# Patient Record
Sex: FEMALE | Race: WHITE | Employment: OTHER | RURAL
[De-identification: names, ages, dates, MRNs, and addresses within clinical notes are randomized per-mention and may not be internally consistent; named-entity substitution may affect disease eponyms.]

---

## 2017-01-04 RX ORDER — SIMVASTATIN 20 MG
TABLET ORAL
Qty: 30 TABLET | Refills: 5 | Status: SHIPPED | OUTPATIENT
Start: 2017-01-04 | End: 2017-06-20 | Stop reason: SDUPTHER

## 2017-01-13 ENCOUNTER — OFFICE VISIT (OUTPATIENT)
Dept: PRIMARY CARE CLINIC | Age: 57
End: 2017-01-13
Payer: MEDICARE

## 2017-01-13 ENCOUNTER — HOSPITAL ENCOUNTER (OUTPATIENT)
Dept: OTHER | Age: 57
Discharge: HOME | End: 2017-01-13
Attending: NURSE PRACTITIONER | Admitting: NURSE PRACTITIONER

## 2017-01-13 VITALS
OXYGEN SATURATION: 98 % | DIASTOLIC BLOOD PRESSURE: 90 MMHG | BODY MASS INDEX: 48.92 KG/M2 | WEIGHT: 293 LBS | SYSTOLIC BLOOD PRESSURE: 130 MMHG | HEART RATE: 80 BPM

## 2017-01-13 DIAGNOSIS — R79.89 ELEVATED SERUM CREATININE: ICD-10-CM

## 2017-01-13 DIAGNOSIS — I10 ESSENTIAL HYPERTENSION: ICD-10-CM

## 2017-01-13 DIAGNOSIS — E87.5 HYPERKALEMIA: ICD-10-CM

## 2017-01-13 DIAGNOSIS — I87.2 VENOUS STASIS DERMATITIS, UNSPECIFIED LATERALITY: ICD-10-CM

## 2017-01-13 DIAGNOSIS — E11.8 TYPE 2 DIABETES MELLITUS WITH COMPLICATION, WITHOUT LONG-TERM CURRENT USE OF INSULIN (HCC): Primary | ICD-10-CM

## 2017-01-13 DIAGNOSIS — E11.8 TYPE 2 DIABETES MELLITUS WITH COMPLICATION, WITHOUT LONG-TERM CURRENT USE OF INSULIN (HCC): ICD-10-CM

## 2017-01-13 DIAGNOSIS — M25.562 LEFT KNEE PAIN, UNSPECIFIED CHRONICITY: ICD-10-CM

## 2017-01-13 LAB
A/G RATIO: 1.6 (ref 0.8–2)
ALBUMIN SERPL-MCNC: 4.2 G/DL (ref 3.4–4.8)
ALP BLD-CCNC: 134 U/L (ref 25–100)
ALT SERPL-CCNC: 8 U/L (ref 4–36)
ANION GAP SERPL CALCULATED.3IONS-SCNC: 15 MMOL/L (ref 3–16)
AST SERPL-CCNC: 11 U/L (ref 8–33)
BILIRUB SERPL-MCNC: 0.3 MG/DL (ref 0.3–1.2)
BUN BLDV-MCNC: 23 MG/DL (ref 6–20)
CALCIUM SERPL-MCNC: 9.3 MG/DL (ref 8.5–10.5)
CHLORIDE BLD-SCNC: 97 MMOL/L (ref 98–107)
CO2: 27 MMOL/L (ref 20–30)
CREAT SERPL-MCNC: 1.6 MG/DL (ref 0.4–1.2)
GFR AFRICAN AMERICAN: 40
GFR NON-AFRICAN AMERICAN: 33
GLOBULIN: 2.6 G/DL
GLUCOSE BLD-MCNC: 192 MG/DL (ref 74–106)
HBA1C MFR BLD: 9.1 %
POTASSIUM SERPL-SCNC: 4.4 MMOL/L (ref 3.4–5.1)
SODIUM BLD-SCNC: 139 MMOL/L (ref 136–145)
TOTAL PROTEIN: 6.8 G/DL (ref 6.4–8.3)

## 2017-01-13 PROCEDURE — G8428 CUR MEDS NOT DOCUMENT: HCPCS | Performed by: NURSE PRACTITIONER

## 2017-01-13 PROCEDURE — G8419 CALC BMI OUT NRM PARAM NOF/U: HCPCS | Performed by: NURSE PRACTITIONER

## 2017-01-13 PROCEDURE — 1036F TOBACCO NON-USER: CPT | Performed by: NURSE PRACTITIONER

## 2017-01-13 PROCEDURE — 99214 OFFICE O/P EST MOD 30 MIN: CPT | Performed by: NURSE PRACTITIONER

## 2017-01-13 PROCEDURE — 3017F COLORECTAL CA SCREEN DOC REV: CPT | Performed by: NURSE PRACTITIONER

## 2017-01-13 PROCEDURE — 3014F SCREEN MAMMO DOC REV: CPT | Performed by: NURSE PRACTITIONER

## 2017-01-13 PROCEDURE — G8484 FLU IMMUNIZE NO ADMIN: HCPCS | Performed by: NURSE PRACTITIONER

## 2017-01-13 PROCEDURE — 3046F HEMOGLOBIN A1C LEVEL >9.0%: CPT | Performed by: NURSE PRACTITIONER

## 2017-01-13 RX ORDER — RANITIDINE 300 MG/1
300 TABLET ORAL 2 TIMES DAILY
Qty: 60 TABLET | Refills: 5 | Status: SHIPPED | OUTPATIENT
Start: 2017-01-13 | End: 2017-05-19 | Stop reason: ALTCHOICE

## 2017-01-13 ASSESSMENT — ENCOUNTER SYMPTOMS
GASTROINTESTINAL NEGATIVE: 1
RESPIRATORY NEGATIVE: 1

## 2017-01-19 ENCOUNTER — TELEPHONE (OUTPATIENT)
Dept: PRIMARY CARE CLINIC | Age: 57
End: 2017-01-19

## 2017-01-29 DIAGNOSIS — I10 ESSENTIAL HYPERTENSION: Primary | ICD-10-CM

## 2017-01-29 RX ORDER — METOPROLOL SUCCINATE 50 MG/1
TABLET, EXTENDED RELEASE ORAL
Qty: 30 TABLET | Refills: 5 | Status: SHIPPED | OUTPATIENT
Start: 2017-01-29 | End: 2017-03-14 | Stop reason: SDUPTHER

## 2017-01-29 RX ORDER — ALLOPURINOL 100 MG/1
100 TABLET ORAL DAILY
Qty: 30 TABLET | Refills: 5 | Status: SHIPPED | OUTPATIENT
Start: 2017-01-29 | End: 2017-07-20 | Stop reason: SDUPTHER

## 2017-01-30 ASSESSMENT — ENCOUNTER SYMPTOMS
SORE THROAT: 0
NAUSEA: 0
COUGH: 0
EYE PAIN: 0
ABDOMINAL PAIN: 0
SHORTNESS OF BREATH: 0
VOMITING: 0

## 2017-02-23 RX ORDER — GLIPIZIDE 10 MG/1
TABLET ORAL
Qty: 120 TABLET | Refills: 5 | Status: SHIPPED | OUTPATIENT
Start: 2017-02-23 | End: 2017-08-19 | Stop reason: SDUPTHER

## 2017-02-23 RX ORDER — MECLIZINE HYDROCHLORIDE 25 MG/1
TABLET ORAL
Qty: 30 TABLET | Refills: 5 | Status: SHIPPED | OUTPATIENT
Start: 2017-02-23 | End: 2017-05-19 | Stop reason: SDUPTHER

## 2017-03-14 ENCOUNTER — HOSPITAL ENCOUNTER (OUTPATIENT)
Dept: OTHER | Age: 57
Discharge: HOME | End: 2017-03-14
Attending: NURSE PRACTITIONER | Admitting: NURSE PRACTITIONER

## 2017-03-14 ENCOUNTER — OFFICE VISIT (OUTPATIENT)
Dept: PRIMARY CARE CLINIC | Age: 57
End: 2017-03-14
Payer: MEDICARE

## 2017-03-14 VITALS
BODY MASS INDEX: 49.59 KG/M2 | DIASTOLIC BLOOD PRESSURE: 80 MMHG | HEART RATE: 82 BPM | SYSTOLIC BLOOD PRESSURE: 170 MMHG | OXYGEN SATURATION: 97 % | WEIGHT: 293 LBS

## 2017-03-14 DIAGNOSIS — E11.8 TYPE 2 DIABETES MELLITUS WITH COMPLICATION, WITHOUT LONG-TERM CURRENT USE OF INSULIN (HCC): ICD-10-CM

## 2017-03-14 DIAGNOSIS — I87.2 VENOUS STASIS DERMATITIS OF BOTH LOWER EXTREMITIES: ICD-10-CM

## 2017-03-14 DIAGNOSIS — R60.9 EDEMA, UNSPECIFIED TYPE: ICD-10-CM

## 2017-03-14 DIAGNOSIS — E11.8 TYPE 2 DIABETES MELLITUS WITH COMPLICATION, WITHOUT LONG-TERM CURRENT USE OF INSULIN (HCC): Primary | ICD-10-CM

## 2017-03-14 DIAGNOSIS — I10 ESSENTIAL HYPERTENSION: ICD-10-CM

## 2017-03-14 LAB
A/G RATIO: 1.7 (ref 0.8–2)
ALBUMIN SERPL-MCNC: 4.1 G/DL (ref 3.4–4.8)
ALP BLD-CCNC: 138 U/L (ref 25–100)
ALT SERPL-CCNC: 9 U/L (ref 4–36)
ANION GAP SERPL CALCULATED.3IONS-SCNC: 14 MMOL/L (ref 3–16)
AST SERPL-CCNC: 9 U/L (ref 8–33)
BILIRUB SERPL-MCNC: 0.3 MG/DL (ref 0.3–1.2)
BUN BLDV-MCNC: 33 MG/DL (ref 6–20)
CALCIUM SERPL-MCNC: 9.1 MG/DL (ref 8.5–10.5)
CHLORIDE BLD-SCNC: 101 MMOL/L (ref 98–107)
CO2: 24 MMOL/L (ref 20–30)
CREAT SERPL-MCNC: 1.9 MG/DL (ref 0.4–1.2)
GFR AFRICAN AMERICAN: 33
GFR NON-AFRICAN AMERICAN: 27
GLOBULIN: 2.4 G/DL
GLUCOSE BLD-MCNC: 326 MG/DL (ref 74–106)
HBA1C MFR BLD: 10.8 %
POTASSIUM SERPL-SCNC: 4.3 MMOL/L (ref 3.4–5.1)
SODIUM BLD-SCNC: 139 MMOL/L (ref 136–145)
TOTAL PROTEIN: 6.5 G/DL (ref 6.4–8.3)

## 2017-03-14 PROCEDURE — 3046F HEMOGLOBIN A1C LEVEL >9.0%: CPT | Performed by: NURSE PRACTITIONER

## 2017-03-14 PROCEDURE — G8417 CALC BMI ABV UP PARAM F/U: HCPCS | Performed by: NURSE PRACTITIONER

## 2017-03-14 PROCEDURE — 1036F TOBACCO NON-USER: CPT | Performed by: NURSE PRACTITIONER

## 2017-03-14 PROCEDURE — G8427 DOCREV CUR MEDS BY ELIG CLIN: HCPCS | Performed by: NURSE PRACTITIONER

## 2017-03-14 PROCEDURE — 3014F SCREEN MAMMO DOC REV: CPT | Performed by: NURSE PRACTITIONER

## 2017-03-14 PROCEDURE — G8484 FLU IMMUNIZE NO ADMIN: HCPCS | Performed by: NURSE PRACTITIONER

## 2017-03-14 PROCEDURE — 3017F COLORECTAL CA SCREEN DOC REV: CPT | Performed by: NURSE PRACTITIONER

## 2017-03-14 PROCEDURE — 99213 OFFICE O/P EST LOW 20 MIN: CPT | Performed by: NURSE PRACTITIONER

## 2017-03-14 RX ORDER — METOPROLOL SUCCINATE 100 MG/1
100 TABLET, EXTENDED RELEASE ORAL DAILY
Qty: 30 TABLET | Refills: 5 | Status: SHIPPED | OUTPATIENT
Start: 2017-03-14 | End: 2017-08-19 | Stop reason: SDUPTHER

## 2017-03-14 ASSESSMENT — ENCOUNTER SYMPTOMS
ABDOMINAL PAIN: 0
VOMITING: 0
NAUSEA: 0
SHORTNESS OF BREATH: 0
SORE THROAT: 0
RESPIRATORY NEGATIVE: 1
GASTROINTESTINAL NEGATIVE: 1
EYE PAIN: 0
COUGH: 0

## 2017-03-15 ENCOUNTER — TELEPHONE (OUTPATIENT)
Dept: PRIMARY CARE CLINIC | Age: 57
End: 2017-03-15

## 2017-03-27 ENCOUNTER — TELEPHONE (OUTPATIENT)
Dept: PRIMARY CARE CLINIC | Age: 57
End: 2017-03-27

## 2017-03-29 RX ORDER — LOPERAMIDE HYDROCHLORIDE 2 MG/1
CAPSULE ORAL
Qty: 30 CAPSULE | Refills: 5 | Status: SHIPPED | OUTPATIENT
Start: 2017-03-29 | End: 2017-11-19 | Stop reason: SDUPTHER

## 2017-04-18 RX ORDER — LOSARTAN POTASSIUM 100 MG/1
TABLET ORAL
Qty: 30 TABLET | Refills: 5 | OUTPATIENT
Start: 2017-04-18

## 2017-04-21 RX ORDER — FUROSEMIDE 40 MG/1
TABLET ORAL
Qty: 30 TABLET | Refills: 5 | Status: SHIPPED | OUTPATIENT
Start: 2017-04-21 | End: 2018-10-01

## 2017-05-19 ENCOUNTER — OFFICE VISIT (OUTPATIENT)
Dept: PRIMARY CARE CLINIC | Age: 57
End: 2017-05-19
Payer: MEDICARE

## 2017-05-19 VITALS
BODY MASS INDEX: 43.38 KG/M2 | WEIGHT: 276.4 LBS | DIASTOLIC BLOOD PRESSURE: 80 MMHG | SYSTOLIC BLOOD PRESSURE: 140 MMHG | HEIGHT: 67 IN | OXYGEN SATURATION: 95 % | HEART RATE: 96 BPM

## 2017-05-19 DIAGNOSIS — R60.9 EDEMA, UNSPECIFIED TYPE: Primary | ICD-10-CM

## 2017-05-19 DIAGNOSIS — K21.9 GASTROESOPHAGEAL REFLUX DISEASE, ESOPHAGITIS PRESENCE NOT SPECIFIED: ICD-10-CM

## 2017-05-19 DIAGNOSIS — I87.2 VENOUS STASIS DERMATITIS OF BOTH LOWER EXTREMITIES: ICD-10-CM

## 2017-05-19 DIAGNOSIS — L03.119 CELLULITIS OF LOWER EXTREMITY, UNSPECIFIED LATERALITY: ICD-10-CM

## 2017-05-19 PROCEDURE — G8427 DOCREV CUR MEDS BY ELIG CLIN: HCPCS | Performed by: NURSE PRACTITIONER

## 2017-05-19 PROCEDURE — 99213 OFFICE O/P EST LOW 20 MIN: CPT | Performed by: NURSE PRACTITIONER

## 2017-05-19 PROCEDURE — 3014F SCREEN MAMMO DOC REV: CPT | Performed by: NURSE PRACTITIONER

## 2017-05-19 PROCEDURE — G8417 CALC BMI ABV UP PARAM F/U: HCPCS | Performed by: NURSE PRACTITIONER

## 2017-05-19 PROCEDURE — 3017F COLORECTAL CA SCREEN DOC REV: CPT | Performed by: NURSE PRACTITIONER

## 2017-05-19 PROCEDURE — 1036F TOBACCO NON-USER: CPT | Performed by: NURSE PRACTITIONER

## 2017-05-19 RX ORDER — RANITIDINE 300 MG/1
300 TABLET ORAL 2 TIMES DAILY
Qty: 60 TABLET | Refills: 5 | Status: CANCELLED | OUTPATIENT
Start: 2017-05-19

## 2017-05-19 RX ORDER — OMEPRAZOLE 40 MG/1
40 CAPSULE, DELAYED RELEASE ORAL DAILY
Qty: 30 CAPSULE | Refills: 5 | Status: ON HOLD | OUTPATIENT
Start: 2017-05-19 | End: 2018-10-02 | Stop reason: SDUPTHER

## 2017-05-19 RX ORDER — SULFAMETHOXAZOLE AND TRIMETHOPRIM 800; 160 MG/1; MG/1
1 TABLET ORAL 2 TIMES DAILY
Qty: 20 TABLET | Refills: 0 | Status: SHIPPED | OUTPATIENT
Start: 2017-05-19 | End: 2017-06-02 | Stop reason: SDUPTHER

## 2017-05-19 RX ORDER — MECLIZINE HYDROCHLORIDE 25 MG/1
TABLET ORAL
Qty: 30 TABLET | Refills: 5 | Status: SHIPPED | OUTPATIENT
Start: 2017-05-19 | End: 2018-04-03 | Stop reason: SDUPTHER

## 2017-05-19 ASSESSMENT — ENCOUNTER SYMPTOMS
GASTROINTESTINAL NEGATIVE: 1
RESPIRATORY NEGATIVE: 1

## 2017-05-22 RX ORDER — LINAGLIPTIN 5 MG/1
TABLET, FILM COATED ORAL
Qty: 30 TABLET | Refills: 5 | Status: SHIPPED | OUTPATIENT
Start: 2017-05-22 | End: 2017-11-17 | Stop reason: SDUPTHER

## 2017-05-22 RX ORDER — AMLODIPINE BESYLATE 5 MG/1
TABLET ORAL
Qty: 30 TABLET | Refills: 5 | Status: SHIPPED | OUTPATIENT
Start: 2017-05-22 | End: 2017-11-17 | Stop reason: SDUPTHER

## 2017-06-02 ENCOUNTER — HOSPITAL ENCOUNTER (OUTPATIENT)
Dept: OTHER | Age: 57
Discharge: HOME | End: 2017-06-02
Attending: NURSE PRACTITIONER | Admitting: NURSE PRACTITIONER

## 2017-06-02 ENCOUNTER — OFFICE VISIT (OUTPATIENT)
Dept: PRIMARY CARE CLINIC | Age: 57
End: 2017-06-02
Payer: MEDICARE

## 2017-06-02 VITALS
SYSTOLIC BLOOD PRESSURE: 130 MMHG | BODY MASS INDEX: 43.7 KG/M2 | OXYGEN SATURATION: 92 % | WEIGHT: 279 LBS | DIASTOLIC BLOOD PRESSURE: 80 MMHG | HEART RATE: 77 BPM

## 2017-06-02 DIAGNOSIS — E11.8 TYPE 2 DIABETES MELLITUS WITH COMPLICATION, WITHOUT LONG-TERM CURRENT USE OF INSULIN (HCC): ICD-10-CM

## 2017-06-02 DIAGNOSIS — I10 ESSENTIAL HYPERTENSION: Primary | ICD-10-CM

## 2017-06-02 DIAGNOSIS — I87.2 VENOUS STASIS DERMATITIS OF BOTH LOWER EXTREMITIES: ICD-10-CM

## 2017-06-02 DIAGNOSIS — R79.89 ELEVATED SERUM CREATININE: ICD-10-CM

## 2017-06-02 DIAGNOSIS — R60.0 BILATERAL EDEMA OF LOWER EXTREMITY: ICD-10-CM

## 2017-06-02 DIAGNOSIS — I10 ESSENTIAL HYPERTENSION: ICD-10-CM

## 2017-06-02 DIAGNOSIS — L03.119 CELLULITIS OF LOWER EXTREMITY, UNSPECIFIED LATERALITY: ICD-10-CM

## 2017-06-02 LAB
A/G RATIO: 1.5 (ref 0.8–2)
ALBUMIN SERPL-MCNC: 4.2 G/DL (ref 3.4–4.8)
ALP BLD-CCNC: 123 U/L (ref 25–100)
ALT SERPL-CCNC: 9 U/L (ref 4–36)
ANION GAP SERPL CALCULATED.3IONS-SCNC: 16 MMOL/L (ref 3–16)
AST SERPL-CCNC: 10 U/L (ref 8–33)
BILIRUB SERPL-MCNC: 0.3 MG/DL (ref 0.3–1.2)
BUN BLDV-MCNC: 43 MG/DL (ref 6–20)
CALCIUM SERPL-MCNC: 9.1 MG/DL (ref 8.5–10.5)
CHLORIDE BLD-SCNC: 97 MMOL/L (ref 98–107)
CHOLESTEROL, TOTAL: 269 MG/DL (ref 0–200)
CO2: 23 MMOL/L (ref 20–30)
CREAT SERPL-MCNC: 3.1 MG/DL (ref 0.4–1.2)
GFR AFRICAN AMERICAN: 19
GFR NON-AFRICAN AMERICAN: 15
GLOBULIN: 2.8 G/DL
GLUCOSE BLD-MCNC: 248 MG/DL (ref 74–106)
HBA1C MFR BLD: 13 %
HDLC SERPL-MCNC: 72 MG/DL (ref 40–60)
LDL CHOLESTEROL CALCULATED: 138 MG/DL
POTASSIUM SERPL-SCNC: 5.7 MMOL/L (ref 3.4–5.1)
SODIUM BLD-SCNC: 136 MMOL/L (ref 136–145)
TOTAL PROTEIN: 7 G/DL (ref 6.4–8.3)
TRIGL SERPL-MCNC: 294 MG/DL (ref 0–249)
VLDLC SERPL CALC-MCNC: 59 MG/DL

## 2017-06-02 PROCEDURE — 1036F TOBACCO NON-USER: CPT | Performed by: NURSE PRACTITIONER

## 2017-06-02 PROCEDURE — G8427 DOCREV CUR MEDS BY ELIG CLIN: HCPCS | Performed by: NURSE PRACTITIONER

## 2017-06-02 PROCEDURE — 3046F HEMOGLOBIN A1C LEVEL >9.0%: CPT | Performed by: NURSE PRACTITIONER

## 2017-06-02 PROCEDURE — 3014F SCREEN MAMMO DOC REV: CPT | Performed by: NURSE PRACTITIONER

## 2017-06-02 PROCEDURE — G8417 CALC BMI ABV UP PARAM F/U: HCPCS | Performed by: NURSE PRACTITIONER

## 2017-06-02 PROCEDURE — 99214 OFFICE O/P EST MOD 30 MIN: CPT | Performed by: NURSE PRACTITIONER

## 2017-06-02 PROCEDURE — 3017F COLORECTAL CA SCREEN DOC REV: CPT | Performed by: NURSE PRACTITIONER

## 2017-06-02 RX ORDER — SULFAMETHOXAZOLE AND TRIMETHOPRIM 800; 160 MG/1; MG/1
1 TABLET ORAL 2 TIMES DAILY
Qty: 28 TABLET | Refills: 0 | Status: SHIPPED | OUTPATIENT
Start: 2017-06-02 | End: 2017-06-16

## 2017-06-02 ASSESSMENT — PATIENT HEALTH QUESTIONNAIRE - PHQ9
2. FEELING DOWN, DEPRESSED OR HOPELESS: 0
SUM OF ALL RESPONSES TO PHQ9 QUESTIONS 1 & 2: 0
SUM OF ALL RESPONSES TO PHQ QUESTIONS 1-9: 0
1. LITTLE INTEREST OR PLEASURE IN DOING THINGS: 0

## 2017-06-02 ASSESSMENT — ENCOUNTER SYMPTOMS
ABDOMINAL PAIN: 0
COUGH: 0
EYE PAIN: 0
VOMITING: 0
SHORTNESS OF BREATH: 0
SORE THROAT: 0
NAUSEA: 0

## 2017-06-06 ASSESSMENT — ENCOUNTER SYMPTOMS
ABDOMINAL PAIN: 0
COUGH: 0
NAUSEA: 0
SORE THROAT: 0
EYE PAIN: 0
SHORTNESS OF BREATH: 0
VOMITING: 0

## 2017-06-07 ENCOUNTER — TELEPHONE (OUTPATIENT)
Dept: PRIMARY CARE CLINIC | Age: 57
End: 2017-06-07

## 2017-06-08 ENCOUNTER — OFFICE VISIT (OUTPATIENT)
Dept: PRIMARY CARE CLINIC | Age: 57
End: 2017-06-08
Payer: MEDICARE

## 2017-06-08 ENCOUNTER — HOSPITAL ENCOUNTER (OUTPATIENT)
Dept: OTHER | Age: 57
Discharge: HOME | End: 2017-06-08
Attending: NURSE PRACTITIONER | Admitting: NURSE PRACTITIONER

## 2017-06-08 VITALS
OXYGEN SATURATION: 98 % | WEIGHT: 278.4 LBS | DIASTOLIC BLOOD PRESSURE: 80 MMHG | BODY MASS INDEX: 43.6 KG/M2 | SYSTOLIC BLOOD PRESSURE: 154 MMHG | HEART RATE: 90 BPM

## 2017-06-08 DIAGNOSIS — R79.89 ELEVATED SERUM CREATININE: Primary | ICD-10-CM

## 2017-06-08 DIAGNOSIS — E11.8 TYPE 2 DIABETES MELLITUS WITH COMPLICATION, WITHOUT LONG-TERM CURRENT USE OF INSULIN (HCC): ICD-10-CM

## 2017-06-08 DIAGNOSIS — R79.89 ELEVATED SERUM CREATININE: ICD-10-CM

## 2017-06-08 DIAGNOSIS — E87.5 HYPERKALEMIA: ICD-10-CM

## 2017-06-08 DIAGNOSIS — I10 ESSENTIAL HYPERTENSION: ICD-10-CM

## 2017-06-08 LAB
ANION GAP SERPL CALCULATED.3IONS-SCNC: 17 MMOL/L (ref 3–16)
BUN BLDV-MCNC: 30 MG/DL (ref 6–20)
CALCIUM SERPL-MCNC: 9.5 MG/DL (ref 8.5–10.5)
CHLORIDE BLD-SCNC: 98 MMOL/L (ref 98–107)
CO2: 22 MMOL/L (ref 20–30)
CREAT SERPL-MCNC: 2.4 MG/DL (ref 0.4–1.2)
GFR AFRICAN AMERICAN: 25
GFR NON-AFRICAN AMERICAN: 21
GLUCOSE BLD-MCNC: 286 MG/DL (ref 74–106)
POTASSIUM SERPL-SCNC: 5.2 MMOL/L (ref 3.4–5.1)
SODIUM BLD-SCNC: 137 MMOL/L (ref 136–145)

## 2017-06-08 PROCEDURE — 3017F COLORECTAL CA SCREEN DOC REV: CPT | Performed by: NURSE PRACTITIONER

## 2017-06-08 PROCEDURE — G8417 CALC BMI ABV UP PARAM F/U: HCPCS | Performed by: NURSE PRACTITIONER

## 2017-06-08 PROCEDURE — 3046F HEMOGLOBIN A1C LEVEL >9.0%: CPT | Performed by: NURSE PRACTITIONER

## 2017-06-08 PROCEDURE — 99213 OFFICE O/P EST LOW 20 MIN: CPT | Performed by: NURSE PRACTITIONER

## 2017-06-08 PROCEDURE — 3014F SCREEN MAMMO DOC REV: CPT | Performed by: NURSE PRACTITIONER

## 2017-06-08 PROCEDURE — 1036F TOBACCO NON-USER: CPT | Performed by: NURSE PRACTITIONER

## 2017-06-08 PROCEDURE — G8427 DOCREV CUR MEDS BY ELIG CLIN: HCPCS | Performed by: NURSE PRACTITIONER

## 2017-06-08 ASSESSMENT — ENCOUNTER SYMPTOMS
VOMITING: 0
SHORTNESS OF BREATH: 0
SORE THROAT: 0
EYE PAIN: 0
ABDOMINAL PAIN: 0
NAUSEA: 0
COUGH: 0

## 2017-06-09 ENCOUNTER — TELEPHONE (OUTPATIENT)
Dept: PRIMARY CARE CLINIC | Age: 57
End: 2017-06-09

## 2017-06-20 RX ORDER — ESOMEPRAZOLE MAGNESIUM 40 MG/1
CAPSULE, DELAYED RELEASE ORAL
Qty: 30 CAPSULE | Refills: 5 | Status: SHIPPED | OUTPATIENT
Start: 2017-06-20 | End: 2017-12-17 | Stop reason: SDUPTHER

## 2017-06-22 ENCOUNTER — OFFICE VISIT (OUTPATIENT)
Dept: PRIMARY CARE CLINIC | Age: 57
End: 2017-06-22
Payer: MEDICARE

## 2017-06-22 ENCOUNTER — HOSPITAL ENCOUNTER (OUTPATIENT)
Dept: OTHER | Age: 57
Discharge: HOME | End: 2017-06-22
Attending: NURSE PRACTITIONER | Admitting: NURSE PRACTITIONER

## 2017-06-22 VITALS
DIASTOLIC BLOOD PRESSURE: 80 MMHG | HEART RATE: 71 BPM | SYSTOLIC BLOOD PRESSURE: 130 MMHG | OXYGEN SATURATION: 97 % | WEIGHT: 279 LBS | BODY MASS INDEX: 43.7 KG/M2

## 2017-06-22 DIAGNOSIS — E87.5 HYPERKALEMIA: ICD-10-CM

## 2017-06-22 DIAGNOSIS — R79.89 ELEVATED SERUM CREATININE: ICD-10-CM

## 2017-06-22 DIAGNOSIS — I10 ESSENTIAL HYPERTENSION: ICD-10-CM

## 2017-06-22 DIAGNOSIS — E11.8 TYPE 2 DIABETES MELLITUS WITH COMPLICATION, WITHOUT LONG-TERM CURRENT USE OF INSULIN (HCC): ICD-10-CM

## 2017-06-22 DIAGNOSIS — R79.89 ELEVATED SERUM CREATININE: Primary | ICD-10-CM

## 2017-06-22 LAB
ANION GAP SERPL CALCULATED.3IONS-SCNC: 16 MMOL/L (ref 3–16)
BUN BLDV-MCNC: 39 MG/DL (ref 6–20)
CALCIUM SERPL-MCNC: 9.1 MG/DL (ref 8.5–10.5)
CHLORIDE BLD-SCNC: 102 MMOL/L (ref 98–107)
CO2: 20 MMOL/L (ref 20–30)
CREAT SERPL-MCNC: 1.9 MG/DL (ref 0.4–1.2)
GFR AFRICAN AMERICAN: 33
GFR NON-AFRICAN AMERICAN: 27
GLUCOSE BLD-MCNC: 208 MG/DL (ref 74–106)
POTASSIUM SERPL-SCNC: 4 MMOL/L (ref 3.4–5.1)
SODIUM BLD-SCNC: 138 MMOL/L (ref 136–145)

## 2017-06-22 PROCEDURE — 3046F HEMOGLOBIN A1C LEVEL >9.0%: CPT | Performed by: NURSE PRACTITIONER

## 2017-06-22 PROCEDURE — G8427 DOCREV CUR MEDS BY ELIG CLIN: HCPCS | Performed by: NURSE PRACTITIONER

## 2017-06-22 PROCEDURE — 99213 OFFICE O/P EST LOW 20 MIN: CPT | Performed by: NURSE PRACTITIONER

## 2017-06-22 PROCEDURE — 3017F COLORECTAL CA SCREEN DOC REV: CPT | Performed by: NURSE PRACTITIONER

## 2017-06-22 PROCEDURE — G8417 CALC BMI ABV UP PARAM F/U: HCPCS | Performed by: NURSE PRACTITIONER

## 2017-06-22 PROCEDURE — 1036F TOBACCO NON-USER: CPT | Performed by: NURSE PRACTITIONER

## 2017-06-22 PROCEDURE — 3014F SCREEN MAMMO DOC REV: CPT | Performed by: NURSE PRACTITIONER

## 2017-06-22 ASSESSMENT — ENCOUNTER SYMPTOMS
COUGH: 0
SORE THROAT: 0
EYE PAIN: 0
ABDOMINAL PAIN: 0
SHORTNESS OF BREATH: 0
VOMITING: 0
NAUSEA: 0

## 2017-06-23 ENCOUNTER — TELEPHONE (OUTPATIENT)
Dept: PRIMARY CARE CLINIC | Age: 57
End: 2017-06-23

## 2017-06-27 ENCOUNTER — TELEPHONE (OUTPATIENT)
Dept: PRIMARY CARE CLINIC | Age: 57
End: 2017-06-27

## 2017-06-27 ENCOUNTER — NURSE ONLY (OUTPATIENT)
Dept: PRIMARY CARE CLINIC | Age: 57
End: 2017-06-27
Payer: MEDICARE

## 2017-06-27 DIAGNOSIS — Z12.11 COLON CANCER SCREENING: Primary | ICD-10-CM

## 2017-06-27 LAB
CONTROL: NORMAL
HEMOCCULT STL QL: NORMAL

## 2017-06-27 PROCEDURE — 82274 ASSAY TEST FOR BLOOD FECAL: CPT | Performed by: NURSE PRACTITIONER

## 2017-07-20 DIAGNOSIS — I10 ESSENTIAL HYPERTENSION: ICD-10-CM

## 2017-07-20 RX ORDER — ALLOPURINOL 100 MG/1
TABLET ORAL
Qty: 30 TABLET | Refills: 5 | Status: SHIPPED | OUTPATIENT
Start: 2017-07-20 | End: 2017-12-08 | Stop reason: SDUPTHER

## 2017-08-19 DIAGNOSIS — I10 ESSENTIAL HYPERTENSION: ICD-10-CM

## 2017-08-21 RX ORDER — METOPROLOL SUCCINATE 100 MG/1
TABLET, EXTENDED RELEASE ORAL
Qty: 30 TABLET | Refills: 5 | Status: SHIPPED | OUTPATIENT
Start: 2017-08-21 | End: 2018-02-15 | Stop reason: SDUPTHER

## 2017-08-21 RX ORDER — GLIPIZIDE 10 MG/1
TABLET ORAL
Qty: 120 TABLET | Refills: 5 | Status: SHIPPED | OUTPATIENT
Start: 2017-08-21 | End: 2018-02-15 | Stop reason: SDUPTHER

## 2017-09-01 ENCOUNTER — OFFICE VISIT (OUTPATIENT)
Dept: PRIMARY CARE CLINIC | Age: 57
End: 2017-09-01
Payer: MEDICARE

## 2017-09-01 VITALS
DIASTOLIC BLOOD PRESSURE: 80 MMHG | BODY MASS INDEX: 41.66 KG/M2 | HEART RATE: 74 BPM | OXYGEN SATURATION: 97 % | SYSTOLIC BLOOD PRESSURE: 130 MMHG | WEIGHT: 266 LBS

## 2017-09-01 DIAGNOSIS — L03.119 CELLULITIS OF LOWER EXTREMITY, UNSPECIFIED LATERALITY: ICD-10-CM

## 2017-09-01 DIAGNOSIS — R60.0 BILATERAL EDEMA OF LOWER EXTREMITY: ICD-10-CM

## 2017-09-01 DIAGNOSIS — I87.2 VENOUS STASIS DERMATITIS OF BOTH LOWER EXTREMITIES: ICD-10-CM

## 2017-09-01 DIAGNOSIS — M10.9 GOUT, UNSPECIFIED CAUSE, UNSPECIFIED CHRONICITY, UNSPECIFIED SITE: ICD-10-CM

## 2017-09-01 DIAGNOSIS — I10 ESSENTIAL HYPERTENSION: ICD-10-CM

## 2017-09-01 DIAGNOSIS — E11.9 TYPE 2 DIABETES MELLITUS WITHOUT COMPLICATION, UNSPECIFIED LONG TERM INSULIN USE STATUS: Primary | ICD-10-CM

## 2017-09-01 LAB — HBA1C MFR BLD: 9.2 %

## 2017-09-01 PROCEDURE — 29580 STRAPPING UNNA BOOT: CPT | Performed by: NURSE PRACTITIONER

## 2017-09-01 PROCEDURE — 3017F COLORECTAL CA SCREEN DOC REV: CPT | Performed by: NURSE PRACTITIONER

## 2017-09-01 PROCEDURE — 3014F SCREEN MAMMO DOC REV: CPT | Performed by: NURSE PRACTITIONER

## 2017-09-01 PROCEDURE — G8417 CALC BMI ABV UP PARAM F/U: HCPCS | Performed by: NURSE PRACTITIONER

## 2017-09-01 PROCEDURE — 83036 HEMOGLOBIN GLYCOSYLATED A1C: CPT | Performed by: NURSE PRACTITIONER

## 2017-09-01 PROCEDURE — 1036F TOBACCO NON-USER: CPT | Performed by: NURSE PRACTITIONER

## 2017-09-01 PROCEDURE — 3046F HEMOGLOBIN A1C LEVEL >9.0%: CPT | Performed by: NURSE PRACTITIONER

## 2017-09-01 PROCEDURE — G8427 DOCREV CUR MEDS BY ELIG CLIN: HCPCS | Performed by: NURSE PRACTITIONER

## 2017-09-01 PROCEDURE — 99214 OFFICE O/P EST MOD 30 MIN: CPT | Performed by: NURSE PRACTITIONER

## 2017-09-01 RX ORDER — SULFAMETHOXAZOLE AND TRIMETHOPRIM 800; 160 MG/1; MG/1
1 TABLET ORAL 2 TIMES DAILY
Qty: 20 TABLET | Refills: 0 | Status: SHIPPED | OUTPATIENT
Start: 2017-09-01 | End: 2017-09-11

## 2017-09-01 ASSESSMENT — ENCOUNTER SYMPTOMS
SORE THROAT: 0
SHORTNESS OF BREATH: 0
NAUSEA: 0
ABDOMINAL PAIN: 0
EYE PAIN: 0
VOMITING: 0
COUGH: 0

## 2017-09-19 RX ORDER — ACETAMINOPHEN/DIPHENHYDRAMINE 500MG-25MG
TABLET ORAL
Qty: 30 TABLET | Refills: 5 | Status: SHIPPED | OUTPATIENT
Start: 2017-09-19 | End: 2018-02-18 | Stop reason: SDUPTHER

## 2017-10-06 ENCOUNTER — OFFICE VISIT (OUTPATIENT)
Dept: PRIMARY CARE CLINIC | Age: 57
End: 2017-10-06
Payer: MEDICARE

## 2017-10-06 ENCOUNTER — TELEPHONE (OUTPATIENT)
Dept: PRIMARY CARE CLINIC | Age: 57
End: 2017-10-06

## 2017-10-06 ENCOUNTER — HOSPITAL ENCOUNTER (OUTPATIENT)
Dept: OTHER | Age: 57
Discharge: HOME | End: 2017-10-06
Attending: NURSE PRACTITIONER | Admitting: NURSE PRACTITIONER

## 2017-10-06 VITALS
DIASTOLIC BLOOD PRESSURE: 70 MMHG | HEART RATE: 77 BPM | BODY MASS INDEX: 42.66 KG/M2 | SYSTOLIC BLOOD PRESSURE: 120 MMHG | WEIGHT: 272.4 LBS | OXYGEN SATURATION: 98 %

## 2017-10-06 DIAGNOSIS — L03.119 CELLULITIS OF LOWER EXTREMITY, UNSPECIFIED LATERALITY: ICD-10-CM

## 2017-10-06 DIAGNOSIS — E11.9 TYPE 2 DIABETES MELLITUS WITHOUT COMPLICATION, UNSPECIFIED LONG TERM INSULIN USE STATUS: Primary | ICD-10-CM

## 2017-10-06 DIAGNOSIS — R79.89 ELEVATED SERUM CREATININE: ICD-10-CM

## 2017-10-06 DIAGNOSIS — I87.2 VENOUS STASIS DERMATITIS, UNSPECIFIED LATERALITY: ICD-10-CM

## 2017-10-06 DIAGNOSIS — M10.9 GOUT, UNSPECIFIED CAUSE, UNSPECIFIED CHRONICITY, UNSPECIFIED SITE: ICD-10-CM

## 2017-10-06 DIAGNOSIS — R60.0 BILATERAL EDEMA OF LOWER EXTREMITY: ICD-10-CM

## 2017-10-06 DIAGNOSIS — E11.9 TYPE 2 DIABETES MELLITUS WITHOUT COMPLICATION, UNSPECIFIED LONG TERM INSULIN USE STATUS: ICD-10-CM

## 2017-10-06 LAB
A/G RATIO: 1.3 (ref 0.8–2)
ALBUMIN SERPL-MCNC: 4.3 G/DL (ref 3.4–4.8)
ALP BLD-CCNC: 108 U/L (ref 25–100)
ALT SERPL-CCNC: 12 U/L (ref 4–36)
ANION GAP SERPL CALCULATED.3IONS-SCNC: 14 MMOL/L (ref 3–16)
AST SERPL-CCNC: 12 U/L (ref 8–33)
BILIRUB SERPL-MCNC: 0.3 MG/DL (ref 0.3–1.2)
BUN BLDV-MCNC: 48 MG/DL (ref 6–20)
CALCIUM SERPL-MCNC: 9.4 MG/DL (ref 8.5–10.5)
CHLORIDE BLD-SCNC: 96 MMOL/L (ref 98–107)
CO2: 27 MMOL/L (ref 20–30)
CREAT SERPL-MCNC: 2.5 MG/DL (ref 0.4–1.2)
GFR AFRICAN AMERICAN: 24
GFR NON-AFRICAN AMERICAN: 20
GLOBULIN: 3.4 G/DL
GLUCOSE BLD-MCNC: 238 MG/DL (ref 74–106)
POTASSIUM SERPL-SCNC: 4.6 MMOL/L (ref 3.4–5.1)
SODIUM BLD-SCNC: 137 MMOL/L (ref 136–145)
TOTAL PROTEIN: 7.7 G/DL (ref 6.4–8.3)
URIC ACID, SERUM: 8.7 MG/DL (ref 2.5–7.1)

## 2017-10-06 PROCEDURE — 90670 PCV13 VACCINE IM: CPT | Performed by: NURSE PRACTITIONER

## 2017-10-06 PROCEDURE — G0008 ADMIN INFLUENZA VIRUS VAC: HCPCS | Performed by: NURSE PRACTITIONER

## 2017-10-06 PROCEDURE — 99213 OFFICE O/P EST LOW 20 MIN: CPT | Performed by: NURSE PRACTITIONER

## 2017-10-06 PROCEDURE — G8417 CALC BMI ABV UP PARAM F/U: HCPCS | Performed by: NURSE PRACTITIONER

## 2017-10-06 PROCEDURE — 3017F COLORECTAL CA SCREEN DOC REV: CPT | Performed by: NURSE PRACTITIONER

## 2017-10-06 PROCEDURE — 1036F TOBACCO NON-USER: CPT | Performed by: NURSE PRACTITIONER

## 2017-10-06 PROCEDURE — G0009 ADMIN PNEUMOCOCCAL VACCINE: HCPCS | Performed by: NURSE PRACTITIONER

## 2017-10-06 PROCEDURE — 90688 IIV4 VACCINE SPLT 0.5 ML IM: CPT | Performed by: NURSE PRACTITIONER

## 2017-10-06 PROCEDURE — G8484 FLU IMMUNIZE NO ADMIN: HCPCS | Performed by: NURSE PRACTITIONER

## 2017-10-06 PROCEDURE — 29580 STRAPPING UNNA BOOT: CPT | Performed by: NURSE PRACTITIONER

## 2017-10-06 PROCEDURE — 3014F SCREEN MAMMO DOC REV: CPT | Performed by: NURSE PRACTITIONER

## 2017-10-06 PROCEDURE — 3046F HEMOGLOBIN A1C LEVEL >9.0%: CPT | Performed by: NURSE PRACTITIONER

## 2017-10-06 PROCEDURE — G8427 DOCREV CUR MEDS BY ELIG CLIN: HCPCS | Performed by: NURSE PRACTITIONER

## 2017-10-06 RX ORDER — CEPHALEXIN 500 MG/1
500 CAPSULE ORAL 2 TIMES DAILY
Qty: 28 CAPSULE | Refills: 0 | Status: SHIPPED | OUTPATIENT
Start: 2017-10-06 | End: 2017-10-20

## 2017-10-06 ASSESSMENT — ENCOUNTER SYMPTOMS
NAUSEA: 0
EYE PAIN: 0
VOMITING: 0
SORE THROAT: 0
COUGH: 0
SHORTNESS OF BREATH: 0
ABDOMINAL PAIN: 0

## 2017-10-06 NOTE — MR AVS SNAPSHOT
worsening diseases associated with obesity. Learn more at: [email or web address]             Today's Medication Changes          These changes are accurate as of: 10/6/17 11:44 AM.  If you have any questions, ask your nurse or doctor. START taking these medications           cephALEXin 500 MG capsule   Commonly known as:  KEFLEX   Instructions:   Take 1 capsule by mouth 2 times daily for 14 days   Quantity:  28 capsule   Refills:  0   Started by:  JOSE Hernadez            Where to Get Your Medications      These medications were sent to [facility] [phone] [place] [phone] [address] [place] [address]     Phone:  [phone]     cephALEXin 500 MG capsule               Your Current Medications Are              cephALEXin (KEFLEX) 500 MG capsule Take 1 capsule by mouth 2 times daily for 14 days    Cyanocobalamin (B-12) 2500 MCG SUBL DISSOLVE 1 TABLET UNDER THE TONGUE DAILY    RA ASPIRIN EC 81 MG EC tablet take 1 tablet by mouth once daily    insulin glargine (LANTUS SOLOSTAR) 100 UNIT/ML injection pen Inject 15 Units into the skin nightly Please instruct pt on how to use this    glipiZIDE (GLUCOTROL) 10 MG tablet take 2 tablets by mouth twice a day WITH BREAKFAST AND SUPPER    metoprolol succinate (TOPROL XL) 100 MG extended release tablet take 1 tablet by mouth once daily    allopurinol (ZYLOPRIM) 100 MG tablet take 1 tablet by mouth once daily    ranitidine (ZANTAC) 300 MG tablet take 1 tablet by mouth twice a day    simvastatin (ZOCOR) 20 MG tablet take 1 tablet by mouth at bedtime    gabapentin (NEURONTIN) 600 MG tablet take 1 tablet by mouth three times a day    esomeprazole (NEXIUM) 40 MG delayed release capsule take 1 capsule by mouth once daily    RA VITAMIN D-3 2000 units CAPS take 1 capsule by mouth once daily    FARXIGA 10 MG tablet take 1 tablet by mouth every morning Insulin Pen Needle 32G X 4 MM MISC 1 each by Does not apply route daily Dx: DM2    amLODIPine (NORVASC) 5 MG tablet take 1 tablet by mouth once daily    TRADJENTA 5 MG tablet take 1 tablet by mouth once daily    meclizine (ANTIVERT) 25 MG tablet take 1 tablet by mouth three times a day if needed for motion sickness    omeprazole (PRILOSEC) 40 MG delayed release capsule Take 1 capsule by mouth daily For stomach D/C Ranitidine    furosemide (LASIX) 40 MG tablet take 1 tablet by mouth once daily    loperamide (IMODIUM) 2 MG capsule take 1 capsule by mouth four times a day if needed    dapagliflozin (FARXIGA) 5 MG tablet Take 1 tablet by mouth every morning    diclofenac sodium (VOLTAREN) 1 % GEL Apply 4 g topically 4 times daily    sodium polystyrene (KAYEXALATE) 15 GM/60ML suspension Take 120 mLs by mouth once for 1 dose    hydrOXYzine (ATARAX) 25 MG tablet Take 1 tablet by mouth every 8 hours as needed for Itching    glucose monitoring kit (FREESTYLE) monitoring kit 1 kit by Does not apply route daily as needed Dx e 11.9    promethazine (PHENERGAN) 25 MG tablet Take 1 tablet by mouth every 6 hours as needed for Nausea    glucose blood VI test strips (FREESTYLE TEST STRIPS) strip Daily dx:250.00    lidocaine (LIDODERM) 5 % Place 1 patch onto the skin daily 12 hours on, 12 hours off.       Allergies              Metformin And Related     HA, diarrhea, throat swelling      We Ordered/Performed the following           INFLUENZA, QUADV, 18 YRS AND OLDER, IM, MDV, 0.5ML (AFLURIA QUADV)     Pneumococcal conjugate vaccine 13-valent IM (PREVNAR 13)          Additional Information        Basic Information     Date Of Birth Sex Race Ethnicity Preferred Language    1960 Female White Non-/Non  English      Problem List as of 10/6/2017  Date Reviewed: 1/11/2016                Bilateral edema of lower extremity    Vitamin B12 deficiency    Hypertension    Type 2 diabetes mellitus with complication ([facility] [address] not need to use this code after youve completed the sign-up process. If you do not sign up before the expiration date, you must request a new code. [employer] Access Code: [email or web address]  Expires: 10/31/2017  2:20 PM    4. Enter your Social Security Number ([id number]) and Date of Birth (mm/dd/yyyy) as indicated and click Submit. You will be taken to the next sign-up page. 5. Create a [employer] ID. This will be your [employer] login ID and cannot be changed, so think of one that is secure and easy to remember. 6. Create a [employer] password. You can change your password at any time. 7. Enter your Password Reset Question and Answer. This can be used at a later time if you forget your password. 8. Enter your e-mail address. You will receive e-mail notification when new information is available in [address] 9. Click Sign Up. You can now view your medical record. Additional Information  If you have questions, please contact the physician practice where you receive care. Remember, [employer] is NOT to be used for urgent needs. For medical emergencies, dial 911. For questions regarding your [employer] account call [phone]. If you have a clinical question, please call your doctor's office.

## 2017-10-06 NOTE — PROGRESS NOTES
After obtaining consent, and per orders of Rosalio Laguna, injection of Prevnar 13 given in Right deltoid by [employer]. Patient instructed to remain in clinic for 20 minutes afterwards, and to report any adverse reaction to me immediately. After obtaining consent, and per orders of Arlin Valencia injection of Flu Shot given in Left deltoid by [employer]. Patient instructed to remain in clinic for 20 minutes afterwards, and to report any adverse reaction to me immediately.

## 2017-10-06 NOTE — PROGRESS NOTES
Have you seen any other physician or provider since your last visit? no    Have you had any other diagnostic tests since your last visit? no    Have you changed or stopped any medications since your last visit including any over-the-counter medicines, vitamins, or herbal medicines? no     Are you taking all your prescribed medications? Yes  If NO, why? -  N/A      REVIEW OF SYSTEMS:  Review of Systems   Constitutional: Negative for chills and fever. HENT: Negative for ear pain and sore throat. Eyes: Negative for pain and visual disturbance. Respiratory: Negative for cough and shortness of breath. Cardiovascular: Positive for leg swelling. Negative for chest pain and palpitations. Gastrointestinal: Negative for abdominal pain, nausea and vomiting. Genitourinary: Negative for dysuria and hematuria. Musculoskeletal: Negative for joint swelling. Skin: Negative for rash. Neurological: Negative for dizziness and weakness. Psychiatric/Behavioral: Negative for sleep disturbance.

## 2017-10-10 NOTE — PROGRESS NOTES
SUBJECTIVE:    Patient ID: David Campbell is a 59 y.o. female. Medical history Review  Past Medical, Family, and Social History reviewed and does contribute to the patient presenting condition    Health Maintenance Due   Topic Date Due    Hepatitis C screen  1960    HIV screen  07/14/1973    Diabetic microalbuminuria test  07/14/1976    DTaP/Tdap/Td vaccine (1 - Tdap) 07/14/1977    Pneumococcal med risk (1 of 1 - PPSV23) 07/14/1977    Diabetic foot exam  11/19/2015    Diabetic retinal exam  11/19/2015        HPI:   Chief Complaint   Patient presents with    Leg Swelling     Patient here today for leg swelling. She thinks her legs need to be wrapped. She is wanting a flu shot, and a RX for the shingles vaccine. Patient's medications, allergies, past medical, surgical, social and family histories were reviewed and updated as appropriate. Review of Systems Reviewed and acurate. See MA note. OBJECTIVE:  /70 (Site: Right Arm, Position: Sitting, Cuff Size: Large Adult)   Pulse 77   Wt 272 lb 6.4 oz (123.6 kg)   SpO2 98%   Breastfeeding? No   BMI 42.66 kg/m²    Physical Exam   Constitutional: She is oriented to person, place, and time. She appears well-developed and well-nourished. No distress. HENT:   Head: Normocephalic. Right Ear: Tympanic membrane normal.   Left Ear: Tympanic membrane normal.   Mouth/Throat: No oropharyngeal exudate. Eyes: Lids are normal.   Neck: Neck supple. Cardiovascular: Normal rate, regular rhythm and normal heart sounds. Pulmonary/Chest: Effort normal and breath sounds normal.   Abdominal: Soft. Bowel sounds are normal. She exhibits no distension. There is no tenderness. Musculoskeletal: She exhibits edema. 1-2+ pitting edema in the BLE, multiple scabbed sores, erythema and raw skin behind left knee   Lymphadenopathy:     She has no cervical adenopathy. Neurological: She is alert and oriented to person, place, and time. Skin: Skin is warm and dry. Psychiatric: She has a normal mood and affect. Vitals reviewed. Results in Past 30 Days  Result Component Current Result Ref Range Previous Result Ref Range   Alb 4.3 (10/6/2017) 3.4 - 4.8 g/dL Not in Time Range    Albumin/Globulin Ratio 1.3 (10/6/2017) 0.8 - 2.0 Not in Time Range    Alkaline Phosphatase 108 (H) (10/6/2017) 25 - 100 U/L Not in Time Range    ALT 12 (10/6/2017) 4 - 36 U/L Not in Time Range    AST 12 (10/6/2017) 8 - 33 U/L Not in Time Range    BUN 48 (H) (10/6/2017) 6 - 20 mg/dL Not in Time Range    Calcium 9.4 (10/6/2017) 8.5 - 10.5 mg/dL Not in Time Range    Chloride 96 (L) (10/6/2017) 98 - 107 mmol/L Not in Time Range    CO2 27 (10/6/2017) 20 - 30 mmol/L Not in Time Range    CREATININE 2.5 (H) (10/6/2017) 0.4 - 1.2 mg/dL Not in Time Range    GFR  24 (L) (10/6/2017) >59 Not in Time Range    GFR Non- 20 (L) (10/6/2017) >59 Not in Time Range    Globulin 3.4 (10/6/2017) g/dL Not in Time Range    Glucose 238 (H) (10/6/2017) 74 - 106 mg/dL Not in Time Range    Potassium 4.6 (10/6/2017) 3.4 - 5.1 mmol/L Not in Time Range    Sodium 137 (10/6/2017) 136 - 145 mmol/L Not in Time Range    Total Bilirubin 0.3 (10/6/2017) 0.3 - 1.2 mg/dL Not in Time Range    Total Protein 7.7 (10/6/2017) 6.4 - 8.3 g/dL Not in Time Range        Hemoglobin A1C (%)   Date Value   09/01/2017 9.2     LDL Calculated (mg/dL)   Date Value   06/02/2017 138 (H)         Lab Results   Component Value Date    WBC 10.1 06/22/2015    NEUTROABS 7.9 06/22/2015    HGB 11.9 06/22/2015    HCT 37.9 06/22/2015    MCV 76.1 06/22/2015     06/22/2015       Lab Results   Component Value Date    TSH 4.45 06/30/2016       Prior to Visit Medications    Medication Sig Taking?  Authorizing Provider   cephALEXin (KEFLEX) 500 MG capsule Take 1 capsule by mouth 2 times daily for 14 days Yes JOSE Childress   Cyanocobalamin (B-12) 2500 MCG SUBL DISSOLVE 1 TABLET UNDER THE TONGUE (KAYEXALATE) 15 GM/60ML suspension Take 120 mLs by mouth once for 1 dose Yes JOSE Gonzáles   hydrOXYzine (ATARAX) 25 MG tablet Take 1 tablet by mouth every 8 hours as needed for Itching Yes JOSE Gonzáles   glucose monitoring kit (FREESTYLE) monitoring kit 1 kit by Does not apply route daily as needed Dx e 11.9 Yes JOSE Gonzáles   promethazine (PHENERGAN) 25 MG tablet Take 1 tablet by mouth every 6 hours as needed for Nausea Yes JOSE Gonzáles   glucose blood VI test strips (FREESTYLE TEST STRIPS) strip Daily dx:250.00 Yes JOSE Gonzáles   lidocaine (LIDODERM) 5 % Place 1 patch onto the skin daily 12 hours on, 12 hours off. Yes JOSE Gonzáles   insulin glargine (LANTUS SOLOSTAR) 100 UNIT/ML injection pen Inject 20 Units into the skin nightly Please instruct pt on how to use this  JOSE Gonzáles       ASSESSMENT:  1. Type 2 diabetes mellitus without complication, unspecified long term insulin use status    2. Venous stasis dermatitis, unspecified laterality    3. Bilateral edema of lower extremity    4. Elevated serum creatinine    5. Cellulitis of lower extremity, unspecified laterality          PLAN:    Orders Placed This Encounter   Medications    cephALEXin (KEFLEX) 500 MG capsule     Sig: Take 1 capsule by mouth 2 times daily for 14 days     Dispense:  28 capsule     Refill:  0     Orders Placed This Encounter   Procedures    INFLUENZA, QUADV, 18 YRS AND OLDER, IM, MDV, 0.5ML (AFLURIA QUADV)    Pneumococcal conjugate vaccine 13-valent IM (PREVNAR 13)    NE APPLY OF PASTE BOOT     There are no Patient Instructions on file for this visit. Return in about 2 months (around 12/6/2017).

## 2017-11-17 RX ORDER — LINAGLIPTIN 5 MG/1
TABLET, FILM COATED ORAL
Qty: 30 TABLET | Refills: 5 | Status: SHIPPED | OUTPATIENT
Start: 2017-11-17 | End: 2018-04-19 | Stop reason: SDUPTHER

## 2017-11-17 RX ORDER — AMLODIPINE BESYLATE 5 MG/1
TABLET ORAL
Qty: 30 TABLET | Refills: 5 | Status: SHIPPED | OUTPATIENT
Start: 2017-11-17 | End: 2018-04-19 | Stop reason: SDUPTHER

## 2017-11-20 RX ORDER — LOPERAMIDE HYDROCHLORIDE 2 MG/1
CAPSULE ORAL
Qty: 30 CAPSULE | Refills: 5 | Status: SHIPPED | OUTPATIENT
Start: 2017-11-20 | End: 2018-06-14 | Stop reason: SDUPTHER

## 2017-11-20 RX ORDER — FUROSEMIDE 20 MG/1
TABLET ORAL
Qty: 60 TABLET | Refills: 2 | Status: SHIPPED | OUTPATIENT
Start: 2017-11-20 | End: 2018-02-19 | Stop reason: SDUPTHER

## 2017-12-06 ENCOUNTER — HOSPITAL ENCOUNTER (OUTPATIENT)
Dept: OTHER | Age: 57
Discharge: HOME | End: 2017-12-06
Attending: NURSE PRACTITIONER | Admitting: NURSE PRACTITIONER

## 2017-12-06 ENCOUNTER — OFFICE VISIT (OUTPATIENT)
Dept: PRIMARY CARE CLINIC | Age: 57
End: 2017-12-06
Payer: MEDICARE

## 2017-12-06 VITALS
BODY MASS INDEX: 44.95 KG/M2 | DIASTOLIC BLOOD PRESSURE: 80 MMHG | SYSTOLIC BLOOD PRESSURE: 150 MMHG | HEART RATE: 74 BPM | WEIGHT: 287 LBS | OXYGEN SATURATION: 98 %

## 2017-12-06 DIAGNOSIS — I87.2 VENOUS STASIS DERMATITIS, UNSPECIFIED LATERALITY: ICD-10-CM

## 2017-12-06 DIAGNOSIS — M25.562 LEFT KNEE PAIN, UNSPECIFIED CHRONICITY: ICD-10-CM

## 2017-12-06 DIAGNOSIS — I10 ESSENTIAL HYPERTENSION: ICD-10-CM

## 2017-12-06 DIAGNOSIS — M10.9 GOUT, UNSPECIFIED CAUSE, UNSPECIFIED CHRONICITY, UNSPECIFIED SITE: ICD-10-CM

## 2017-12-06 DIAGNOSIS — E11.9 TYPE 2 DIABETES MELLITUS WITHOUT COMPLICATION, UNSPECIFIED LONG TERM INSULIN USE STATUS: Primary | ICD-10-CM

## 2017-12-06 DIAGNOSIS — E11.9 TYPE 2 DIABETES MELLITUS WITHOUT COMPLICATION, UNSPECIFIED LONG TERM INSULIN USE STATUS: ICD-10-CM

## 2017-12-06 DIAGNOSIS — R60.0 BILATERAL EDEMA OF LOWER EXTREMITY: ICD-10-CM

## 2017-12-06 DIAGNOSIS — J01.90 ACUTE SINUSITIS, RECURRENCE NOT SPECIFIED, UNSPECIFIED LOCATION: ICD-10-CM

## 2017-12-06 LAB
A/G RATIO: 1.6 (ref 0.8–2)
ALBUMIN SERPL-MCNC: 4.5 G/DL (ref 3.4–4.8)
ALP BLD-CCNC: 121 U/L (ref 25–100)
ALT SERPL-CCNC: 12 U/L (ref 4–36)
ANION GAP SERPL CALCULATED.3IONS-SCNC: 13 MMOL/L (ref 3–16)
AST SERPL-CCNC: 13 U/L (ref 8–33)
BILIRUB SERPL-MCNC: <0.2 MG/DL (ref 0.3–1.2)
BUN BLDV-MCNC: 36 MG/DL (ref 6–20)
CALCIUM SERPL-MCNC: 9.3 MG/DL (ref 8.5–10.5)
CHLORIDE BLD-SCNC: 101 MMOL/L (ref 98–107)
CO2: 26 MMOL/L (ref 20–30)
CREAT SERPL-MCNC: 1.7 MG/DL (ref 0.4–1.2)
GFR AFRICAN AMERICAN: 37
GFR NON-AFRICAN AMERICAN: 31
GLOBULIN: 2.9 G/DL
GLUCOSE BLD-MCNC: 207 MG/DL (ref 74–106)
HBA1C MFR BLD: 8.9 %
POTASSIUM SERPL-SCNC: 4.7 MMOL/L (ref 3.4–5.1)
SODIUM BLD-SCNC: 140 MMOL/L (ref 136–145)
TOTAL PROTEIN: 7.4 G/DL (ref 6.4–8.3)
URIC ACID, SERUM: 8 MG/DL (ref 2.5–7.1)

## 2017-12-06 PROCEDURE — G8484 FLU IMMUNIZE NO ADMIN: HCPCS | Performed by: NURSE PRACTITIONER

## 2017-12-06 PROCEDURE — G8427 DOCREV CUR MEDS BY ELIG CLIN: HCPCS | Performed by: NURSE PRACTITIONER

## 2017-12-06 PROCEDURE — 1036F TOBACCO NON-USER: CPT | Performed by: NURSE PRACTITIONER

## 2017-12-06 PROCEDURE — G8417 CALC BMI ABV UP PARAM F/U: HCPCS | Performed by: NURSE PRACTITIONER

## 2017-12-06 PROCEDURE — 3046F HEMOGLOBIN A1C LEVEL >9.0%: CPT | Performed by: NURSE PRACTITIONER

## 2017-12-06 PROCEDURE — 3017F COLORECTAL CA SCREEN DOC REV: CPT | Performed by: NURSE PRACTITIONER

## 2017-12-06 PROCEDURE — 3014F SCREEN MAMMO DOC REV: CPT | Performed by: NURSE PRACTITIONER

## 2017-12-06 PROCEDURE — 99214 OFFICE O/P EST MOD 30 MIN: CPT | Performed by: NURSE PRACTITIONER

## 2017-12-06 RX ORDER — SULFAMETHOXAZOLE AND TRIMETHOPRIM 800; 160 MG/1; MG/1
1 TABLET ORAL 2 TIMES DAILY
Qty: 20 TABLET | Refills: 0 | Status: SHIPPED | OUTPATIENT
Start: 2017-12-06 | End: 2018-03-06 | Stop reason: SDUPTHER

## 2017-12-06 RX ORDER — METHYLPREDNISOLONE 4 MG/1
TABLET ORAL
Qty: 1 KIT | Refills: 0 | Status: SHIPPED | OUTPATIENT
Start: 2017-12-06 | End: 2018-04-15 | Stop reason: ALTCHOICE

## 2017-12-06 ASSESSMENT — ENCOUNTER SYMPTOMS
NAUSEA: 0
SORE THROAT: 0
COUGH: 1
SHORTNESS OF BREATH: 0
ABDOMINAL PAIN: 0
VOMITING: 0
EYE PAIN: 0

## 2017-12-06 NOTE — PROGRESS NOTES
WITH BREAKFAST AND SUPPER Yes Red Duron,    metoprolol succinate (TOPROL XL) 100 MG extended release tablet take 1 tablet by mouth once daily Yes Red Duron,    allopurinol (ZYLOPRIM) 100 MG tablet take 1 tablet by mouth once daily Yes JOSE River   ranitidine (ZANTAC) 300 MG tablet take 1 tablet by mouth twice a day Yes JOSE River   simvastatin (ZOCOR) 20 MG tablet take 1 tablet by mouth at bedtime Yes JOSE River   gabapentin (NEURONTIN) 600 MG tablet take 1 tablet by mouth three times a day Yes JOSE River   esomeprazole (NEXIUM) 40 MG delayed release capsule take 1 capsule by mouth once daily Yes JOSE River   meclizine (ANTIVERT) 25 MG tablet take 1 tablet by mouth three times a day if needed for motion sickness Yes JOSE River   omeprazole (PRILOSEC) 40 MG delayed release capsule Take 1 capsule by mouth daily For stomach D/C Ranitidine Yes JOSE River   furosemide (LASIX) 40 MG tablet take 1 tablet by mouth once daily Yes JOSE River   dapagliflozin (FARXIGA) 5 MG tablet Take 1 tablet by mouth every morning Yes JOSE River   diclofenac sodium (VOLTAREN) 1 % GEL Apply 4 g topically 4 times daily Yes JOSE River   hydrOXYzine (ATARAX) 25 MG tablet Take 1 tablet by mouth every 8 hours as needed for Itching Yes JOSE River   glucose monitoring kit (FREESTYLE) monitoring kit 1 kit by Does not apply route daily as needed Dx e 11.9 Yes JOSE River   promethazine (PHENERGAN) 25 MG tablet Take 1 tablet by mouth every 6 hours as needed for Nausea Yes JOSE River   glucose blood VI test strips (FREESTYLE TEST STRIPS) strip Daily dx:250.00 Yes JOSE River   lidocaine (LIDODERM) 5 % Place 1 patch onto the skin daily 12 hours on, 12 hours off. Yes JOSE River       ASSESSMENT:  1.  Type 2 diabetes mellitus without complication, unspecified long term insulin use status ([facility]

## 2017-12-08 ENCOUNTER — TELEPHONE (OUTPATIENT)
Dept: PRIMARY CARE CLINIC | Age: 57
End: 2017-12-08

## 2017-12-08 DIAGNOSIS — I10 ESSENTIAL HYPERTENSION: ICD-10-CM

## 2017-12-08 RX ORDER — ALLOPURINOL 300 MG/1
TABLET ORAL
Qty: 30 TABLET | Refills: 5 | Status: SHIPPED | OUTPATIENT
Start: 2017-12-08 | End: 2018-06-25 | Stop reason: SDUPTHER

## 2017-12-08 NOTE — TELEPHONE ENCOUNTER
----- Message from JOSE Raymundo sent at 12/7/2017  2:38 PM EST -----  Gout test is up. Sugar is better but still up some. Increase Allopurinol to 300mg daily for gout. Increase insulin to 25u daily.

## 2017-12-08 NOTE — LETTER
[address] [place] [address]  Phone: [phone]  Fax: [phone]    JOSE Hernadez        December 8, 2017    [address]      Dear Priscila Craft: We have received your recent lab results. Gout test is up. Sugar is better but still up some. Increase Allopurinol to 300mg daily for gout. Increase insulin to 25u daily. I have sent new Allopurinol to the pharmacy. If you have any questions or concerns, please don't hesitate to call.     Sincerely,        JOSE Hernadez

## 2017-12-19 RX ORDER — SIMVASTATIN 20 MG
TABLET ORAL
Qty: 30 TABLET | Refills: 5 | Status: SHIPPED | OUTPATIENT
Start: 2017-12-19 | End: 2018-05-18 | Stop reason: SDUPTHER

## 2017-12-19 RX ORDER — GABAPENTIN 600 MG/1
TABLET ORAL
Qty: 90 TABLET | Refills: 5 | Status: SHIPPED | OUTPATIENT
Start: 2017-12-19 | End: 2018-05-18 | Stop reason: SDUPTHER

## 2017-12-19 RX ORDER — ESOMEPRAZOLE MAGNESIUM 40 MG/1
CAPSULE, DELAYED RELEASE ORAL
Qty: 30 CAPSULE | Refills: 5 | Status: SHIPPED | OUTPATIENT
Start: 2017-12-19 | End: 2018-05-18 | Stop reason: SDUPTHER

## 2018-02-15 DIAGNOSIS — I10 ESSENTIAL HYPERTENSION: ICD-10-CM

## 2018-02-15 RX ORDER — METOPROLOL SUCCINATE 100 MG/1
TABLET, EXTENDED RELEASE ORAL
Qty: 30 TABLET | Refills: 5 | Status: SHIPPED | OUTPATIENT
Start: 2018-02-15 | End: 2018-04-22 | Stop reason: SDUPTHER

## 2018-02-15 RX ORDER — GLIPIZIDE 10 MG/1
TABLET ORAL
Qty: 120 TABLET | Refills: 5 | Status: SHIPPED | OUTPATIENT
Start: 2018-02-15 | End: 2018-04-22 | Stop reason: SDUPTHER

## 2018-02-19 RX ORDER — ACETAMINOPHEN/DIPHENHYDRAMINE 500MG-25MG
TABLET ORAL
Qty: 30 TABLET | Refills: 5 | Status: SHIPPED | OUTPATIENT
Start: 2018-02-19 | End: 2018-09-04 | Stop reason: SDUPTHER

## 2018-02-19 RX ORDER — FUROSEMIDE 20 MG/1
TABLET ORAL
Qty: 60 TABLET | Refills: 5 | Status: SHIPPED | OUTPATIENT
Start: 2018-02-19 | End: 2018-09-07 | Stop reason: SDUPTHER

## 2018-03-06 ENCOUNTER — OFFICE VISIT (OUTPATIENT)
Dept: PRIMARY CARE CLINIC | Age: 58
End: 2018-03-06
Payer: MEDICARE

## 2018-03-06 ENCOUNTER — HOSPITAL ENCOUNTER (OUTPATIENT)
Dept: OTHER | Age: 58
Discharge: HOME | End: 2018-03-06
Attending: NURSE PRACTITIONER | Admitting: NURSE PRACTITIONER

## 2018-03-06 VITALS
HEART RATE: 100 BPM | DIASTOLIC BLOOD PRESSURE: 92 MMHG | SYSTOLIC BLOOD PRESSURE: 140 MMHG | OXYGEN SATURATION: 97 % | WEIGHT: 293 LBS | BODY MASS INDEX: 46.99 KG/M2

## 2018-03-06 DIAGNOSIS — E11.9 TYPE 2 DIABETES MELLITUS WITHOUT COMPLICATION, UNSPECIFIED LONG TERM INSULIN USE STATUS: ICD-10-CM

## 2018-03-06 DIAGNOSIS — I87.2 VENOUS STASIS DERMATITIS, UNSPECIFIED LATERALITY: ICD-10-CM

## 2018-03-06 DIAGNOSIS — L03.119 CELLULITIS OF LOWER EXTREMITY, UNSPECIFIED LATERALITY: ICD-10-CM

## 2018-03-06 DIAGNOSIS — I10 ESSENTIAL HYPERTENSION: Primary | ICD-10-CM

## 2018-03-06 DIAGNOSIS — I10 ESSENTIAL HYPERTENSION: ICD-10-CM

## 2018-03-06 LAB
A/G RATIO: 1.5 (ref 0.8–2)
ALBUMIN SERPL-MCNC: 4.1 G/DL (ref 3.4–4.8)
ALP BLD-CCNC: 118 U/L (ref 25–100)
ALT SERPL-CCNC: 8 U/L (ref 4–36)
ANION GAP SERPL CALCULATED.3IONS-SCNC: 13 MMOL/L (ref 3–16)
AST SERPL-CCNC: 10 U/L (ref 8–33)
BILIRUB SERPL-MCNC: <0.2 MG/DL (ref 0.3–1.2)
BUN BLDV-MCNC: 30 MG/DL (ref 6–20)
CALCIUM SERPL-MCNC: 9 MG/DL (ref 8.5–10.5)
CHLORIDE BLD-SCNC: 102 MMOL/L (ref 98–107)
CHOLESTEROL, TOTAL: 197 MG/DL (ref 0–200)
CO2: 25 MMOL/L (ref 20–30)
CREAT SERPL-MCNC: 1.4 MG/DL (ref 0.4–1.2)
GFR AFRICAN AMERICAN: 46
GFR NON-AFRICAN AMERICAN: 38
GLOBULIN: 2.8 G/DL
GLUCOSE BLD-MCNC: 211 MG/DL (ref 74–106)
HBA1C MFR BLD: 8.8 %
HDLC SERPL-MCNC: 78 MG/DL (ref 40–60)
LDL CHOLESTEROL CALCULATED: 88 MG/DL
POTASSIUM SERPL-SCNC: 4.4 MMOL/L (ref 3.4–5.1)
SODIUM BLD-SCNC: 140 MMOL/L (ref 136–145)
TOTAL PROTEIN: 6.9 G/DL (ref 6.4–8.3)
TRIGL SERPL-MCNC: 153 MG/DL (ref 0–249)
VLDLC SERPL CALC-MCNC: 31 MG/DL

## 2018-03-06 PROCEDURE — 29580 STRAPPING UNNA BOOT: CPT | Performed by: NURSE PRACTITIONER

## 2018-03-06 PROCEDURE — 3017F COLORECTAL CA SCREEN DOC REV: CPT | Performed by: NURSE PRACTITIONER

## 2018-03-06 PROCEDURE — G8482 FLU IMMUNIZE ORDER/ADMIN: HCPCS | Performed by: NURSE PRACTITIONER

## 2018-03-06 PROCEDURE — 3014F SCREEN MAMMO DOC REV: CPT | Performed by: NURSE PRACTITIONER

## 2018-03-06 PROCEDURE — G8427 DOCREV CUR MEDS BY ELIG CLIN: HCPCS | Performed by: NURSE PRACTITIONER

## 2018-03-06 PROCEDURE — G8417 CALC BMI ABV UP PARAM F/U: HCPCS | Performed by: NURSE PRACTITIONER

## 2018-03-06 PROCEDURE — 1036F TOBACCO NON-USER: CPT | Performed by: NURSE PRACTITIONER

## 2018-03-06 PROCEDURE — 99213 OFFICE O/P EST LOW 20 MIN: CPT | Performed by: NURSE PRACTITIONER

## 2018-03-06 PROCEDURE — 3045F PR MOST RECENT HEMOGLOBIN A1C LEVEL 7.0-9.0%: CPT | Performed by: NURSE PRACTITIONER

## 2018-03-06 RX ORDER — SULFAMETHOXAZOLE AND TRIMETHOPRIM 800; 160 MG/1; MG/1
1 TABLET ORAL 2 TIMES DAILY
Qty: 20 TABLET | Refills: 0 | Status: SHIPPED | OUTPATIENT
Start: 2018-03-06 | End: 2018-12-17 | Stop reason: SDUPTHER

## 2018-03-06 ASSESSMENT — ENCOUNTER SYMPTOMS
ABDOMINAL PAIN: 0
SORE THROAT: 0
SHORTNESS OF BREATH: 0
VOMITING: 0
NAUSEA: 0
EYE PAIN: 0
COUGH: 0

## 2018-03-08 ENCOUNTER — TELEPHONE (OUTPATIENT)
Dept: PRIMARY CARE CLINIC | Age: 58
End: 2018-03-08

## 2018-03-08 NOTE — TELEPHONE ENCOUNTER
----- Message from JOSE Rodriguez sent at 3/6/2018  3:27 PM EST -----  Increase insulin to 30u, verify she is taking 25u.

## 2018-03-22 RX ORDER — GLUCOSAMINE/CHONDR SU A SOD 750-600 MG
TABLET ORAL
Qty: 30 CAPSULE | Refills: 5 | Status: SHIPPED | OUTPATIENT
Start: 2018-03-22 | End: 2018-09-04 | Stop reason: SDUPTHER

## 2018-03-27 NOTE — PROGRESS NOTES
applied   Lymphadenopathy:     She has no cervical adenopathy. Neurological: She is alert and oriented to person, place, and time. Skin: Skin is warm and dry. Psychiatric: She has a normal mood and affect. Vitals reviewed. Results in Past 30 Days  Result Component Current Result Ref Range Previous Result Ref Range   Alb 4.1 (3/6/2018) 3.4 - 4.8 g/dL Not in Time Range    Albumin/Globulin Ratio 1.5 (3/6/2018) 0.8 - 2.0 Not in Time Range    Alkaline Phosphatase 118 (H) (3/6/2018) 25 - 100 U/L Not in Time Range    ALT 8 (3/6/2018) 4 - 36 U/L Not in Time Range    AST 10 (3/6/2018) 8 - 33 U/L Not in Time Range    BUN 30 (H) (3/6/2018) 6 - 20 mg/dL Not in Time Range    Calcium 9.0 (3/6/2018) 8.5 - 10.5 mg/dL Not in Time Range    Chloride 102 (3/6/2018) 98 - 107 mmol/L Not in Time Range    CO2 25 (3/6/2018) 20 - 30 mmol/L Not in Time Range    CREATININE 1.4 (H) (3/6/2018) 0.4 - 1.2 mg/dL Not in Time Range    GFR  46 (L) (3/6/2018) >59 Not in Time Range    GFR Non- 38 (L) (3/6/2018) >59 Not in Time Range    Globulin 2.8 (3/6/2018) g/dL Not in Time Range    Glucose 211 (H) (3/6/2018) 74 - 106 mg/dL Not in Time Range    Potassium 4.4 (3/6/2018) 3.4 - 5.1 mmol/L Not in Time Range    Sodium 140 (3/6/2018) 136 - 145 mmol/L Not in Time Range    Total Bilirubin <0.2 (L) (3/6/2018) 0.3 - 1.2 mg/dL Not in Time Range    Total Protein 6.9 (3/6/2018) 6.4 - 8.3 g/dL Not in Time Range        Hemoglobin A1C (%)   Date Value   03/06/2018 8.8 (H)     LDL Calculated (mg/dL)   Date Value   03/06/2018 88         Lab Results   Component Value Date    WBC 10.1 06/22/2015    NEUTROABS 7.9 06/22/2015    HGB 11.9 06/22/2015    HCT 37.9 06/22/2015    MCV 76.1 06/22/2015     06/22/2015       Lab Results   Component Value Date    TSH 4.45 06/30/2016       Prior to Visit Medications    Medication Sig Taking?  Authorizing Provider   Insulin Pen Needle 32G X 4 MM MISC 1 each by Does not apply route daily Dx: DM2 Yes JOSE Palm   RA ASPIRIN EC 81 MG EC tablet take 1 tablet by mouth once daily Yes JOSE Palm   Cyanocobalamin (B-12) 2500 MCG SUBL DISSOLVE 1 TABLET UNDER THE TONGUE DAILY Yes JOSE Palm   furosemide (LASIX) 20 MG tablet TAKE 2 TABLETS (40MG) BY MOUTH DAILY Yes JOSE Palm   glipiZIDE (GLUCOTROL) 10 MG tablet take 2 tablets by mouth twice a day with BREAKFAST AND SUPPER Yes JOSE Palm   metoprolol succinate (TOPROL XL) 100 MG extended release tablet take 1 tablet by mouth once daily Yes JOSE Palm   simvastatin (ZOCOR) 20 MG tablet take 1 tablet by mouth at bedtime Yes JOSE Palm   gabapentin (NEURONTIN) 600 MG tablet take 1 tablet by mouth three times a day Yes JOSE Palm   esomeprazole (NEXIUM) 40 MG delayed release capsule take 1 capsule by mouth once daily Yes JOSE Palm   insulin detemir (LEVEMIR) 100 UNIT/ML injection vial Inject 25 Units into the skin nightly Sub for Lantus Yes JOSE Palm   allopurinol (ZYLOPRIM) 300 MG tablet take 1 tablet by mouth once daily,dosage increased Yes JOSE Palm   methylPREDNISolone (MEDROL, KRISTOFER,) 4 MG tablet Take with food.  Yes Becca Bauer APRN   loperamide (IMODIUM) 2 MG capsule take 1 capsule by mouth four times a day if needed Yes JOSE Palm   amLODIPine (NORVASC) 5 MG tablet take 1 tablet by mouth once daily Yes JOSE Palm   TRADJENTA 5 MG tablet TAKE 1 TABLET BY MOUTH ONCE A DAY Yes JOSE Palm   ranitidine (ZANTAC) 300 MG tablet take 1 tablet by mouth twice a day Yes JOSE Palm   meclizine (ANTIVERT) 25 MG tablet take 1 tablet by mouth three times a day if needed for motion sickness Yes JOSE Palm   omeprazole (PRILOSEC) 40 MG delayed release capsule Take 1 capsule by mouth daily For stomach D/C Ranitidine Yes JOSE Palm   furosemide (LASIX) 40 MG tablet take 1 tablet by mouth once daily Yes

## 2018-04-03 RX ORDER — MECLIZINE HYDROCHLORIDE 25 MG/1
TABLET ORAL
Qty: 30 TABLET | Refills: 5 | Status: SHIPPED | OUTPATIENT
Start: 2018-04-03 | End: 2018-10-01

## 2018-04-16 ENCOUNTER — TELEPHONE (OUTPATIENT)
Dept: PRIMARY CARE CLINIC | Age: 58
End: 2018-04-16

## 2018-04-19 RX ORDER — AMLODIPINE BESYLATE 5 MG/1
TABLET ORAL
Qty: 30 TABLET | Refills: 5 | Status: ON HOLD | OUTPATIENT
Start: 2018-04-19 | End: 2018-10-07 | Stop reason: HOSPADM

## 2018-04-19 RX ORDER — LINAGLIPTIN 5 MG/1
TABLET, FILM COATED ORAL
Qty: 30 TABLET | Refills: 5 | Status: SHIPPED | OUTPATIENT
Start: 2018-04-19 | End: 2018-10-30 | Stop reason: SDUPTHER

## 2018-04-22 DIAGNOSIS — I10 ESSENTIAL HYPERTENSION: ICD-10-CM

## 2018-04-23 RX ORDER — METOPROLOL SUCCINATE 100 MG/1
TABLET, EXTENDED RELEASE ORAL
Qty: 90 TABLET | Refills: 5 | Status: SHIPPED | OUTPATIENT
Start: 2018-04-23 | End: 2018-10-01

## 2018-04-23 RX ORDER — GLIPIZIDE 10 MG/1
TABLET ORAL
Qty: 360 TABLET | Refills: 5 | Status: SHIPPED | OUTPATIENT
Start: 2018-04-23 | End: 2018-06-26 | Stop reason: SDUPTHER

## 2018-05-19 RX ORDER — ESOMEPRAZOLE MAGNESIUM 40 MG/1
CAPSULE, DELAYED RELEASE ORAL
Qty: 30 CAPSULE | Refills: 5 | Status: SHIPPED | OUTPATIENT
Start: 2018-05-19 | End: 2018-11-27 | Stop reason: SDUPTHER

## 2018-05-19 RX ORDER — SIMVASTATIN 20 MG
TABLET ORAL
Qty: 30 TABLET | Refills: 5 | Status: SHIPPED | OUTPATIENT
Start: 2018-05-19 | End: 2018-11-27 | Stop reason: SDUPTHER

## 2018-05-22 RX ORDER — GABAPENTIN 600 MG/1
TABLET ORAL
Qty: 90 TABLET | Refills: 5 | Status: SHIPPED | OUTPATIENT
Start: 2018-05-22 | End: 2018-12-03 | Stop reason: SDUPTHER

## 2018-06-14 ENCOUNTER — OFFICE VISIT (OUTPATIENT)
Dept: PRIMARY CARE CLINIC | Age: 58
End: 2018-06-14
Payer: MEDICARE

## 2018-06-14 ENCOUNTER — HOSPITAL ENCOUNTER (OUTPATIENT)
Dept: OTHER | Age: 58
Discharge: HOME | End: 2018-06-14
Attending: NURSE PRACTITIONER | Admitting: NURSE PRACTITIONER

## 2018-06-14 VITALS
OXYGEN SATURATION: 97 % | WEIGHT: 293 LBS | HEART RATE: 99 BPM | DIASTOLIC BLOOD PRESSURE: 70 MMHG | SYSTOLIC BLOOD PRESSURE: 150 MMHG | BODY MASS INDEX: 48.4 KG/M2

## 2018-06-14 DIAGNOSIS — E11.9 TYPE 2 DIABETES MELLITUS WITHOUT COMPLICATION, WITH LONG-TERM CURRENT USE OF INSULIN (HCC): ICD-10-CM

## 2018-06-14 DIAGNOSIS — E11.9 TYPE 2 DIABETES MELLITUS WITHOUT COMPLICATION, WITH LONG-TERM CURRENT USE OF INSULIN (HCC): Primary | ICD-10-CM

## 2018-06-14 DIAGNOSIS — Z79.4 TYPE 2 DIABETES MELLITUS WITHOUT COMPLICATION, WITH LONG-TERM CURRENT USE OF INSULIN (HCC): Primary | ICD-10-CM

## 2018-06-14 DIAGNOSIS — L03.119 CELLULITIS OF LOWER EXTREMITY, UNSPECIFIED LATERALITY: ICD-10-CM

## 2018-06-14 DIAGNOSIS — R79.89 ELEVATED SERUM CREATININE: ICD-10-CM

## 2018-06-14 DIAGNOSIS — D64.9 ANEMIA, UNSPECIFIED TYPE: ICD-10-CM

## 2018-06-14 DIAGNOSIS — Z79.4 TYPE 2 DIABETES MELLITUS WITHOUT COMPLICATION, WITH LONG-TERM CURRENT USE OF INSULIN (HCC): ICD-10-CM

## 2018-06-14 DIAGNOSIS — I87.2 VENOUS STASIS DERMATITIS, UNSPECIFIED LATERALITY: ICD-10-CM

## 2018-06-14 PROCEDURE — G8417 CALC BMI ABV UP PARAM F/U: HCPCS | Performed by: NURSE PRACTITIONER

## 2018-06-14 PROCEDURE — 3045F PR MOST RECENT HEMOGLOBIN A1C LEVEL 7.0-9.0%: CPT | Performed by: NURSE PRACTITIONER

## 2018-06-14 PROCEDURE — 2022F DILAT RTA XM EVC RTNOPTHY: CPT | Performed by: NURSE PRACTITIONER

## 2018-06-14 PROCEDURE — 1036F TOBACCO NON-USER: CPT | Performed by: NURSE PRACTITIONER

## 2018-06-14 PROCEDURE — G8427 DOCREV CUR MEDS BY ELIG CLIN: HCPCS | Performed by: NURSE PRACTITIONER

## 2018-06-14 PROCEDURE — 3017F COLORECTAL CA SCREEN DOC REV: CPT | Performed by: NURSE PRACTITIONER

## 2018-06-14 PROCEDURE — 99213 OFFICE O/P EST LOW 20 MIN: CPT | Performed by: NURSE PRACTITIONER

## 2018-06-14 RX ORDER — ASCORBIC ACID 500 MG
500 TABLET ORAL DAILY
Qty: 30 TABLET | Refills: 5 | Status: SHIPPED | OUTPATIENT
Start: 2018-06-14 | End: 2018-11-27 | Stop reason: SDUPTHER

## 2018-06-14 RX ORDER — CLINDAMYCIN HYDROCHLORIDE 300 MG/1
300 CAPSULE ORAL 3 TIMES DAILY
Qty: 30 CAPSULE | Refills: 0 | Status: SHIPPED | OUTPATIENT
Start: 2018-06-14 | End: 2018-06-26 | Stop reason: SDUPTHER

## 2018-06-14 RX ORDER — LOPERAMIDE HYDROCHLORIDE 2 MG/1
CAPSULE ORAL
Qty: 30 CAPSULE | Refills: 5 | Status: SHIPPED | OUTPATIENT
Start: 2018-06-14 | End: 2018-11-25 | Stop reason: SDUPTHER

## 2018-06-14 RX ORDER — ZINC SULFATE 50(220)MG
220 CAPSULE ORAL DAILY
Qty: 30 CAPSULE | Refills: 5 | Status: ON HOLD | OUTPATIENT
Start: 2018-06-14 | End: 2019-02-05

## 2018-06-14 ASSESSMENT — ENCOUNTER SYMPTOMS
COUGH: 0
SORE THROAT: 0
ABDOMINAL PAIN: 0
NAUSEA: 0
EYE PAIN: 0
SHORTNESS OF BREATH: 0
VOMITING: 0

## 2018-06-14 ASSESSMENT — PATIENT HEALTH QUESTIONNAIRE - PHQ9
1. LITTLE INTEREST OR PLEASURE IN DOING THINGS: 0
2. FEELING DOWN, DEPRESSED OR HOPELESS: 0
SUM OF ALL RESPONSES TO PHQ9 QUESTIONS 1 & 2: 0
SUM OF ALL RESPONSES TO PHQ QUESTIONS 1-9: 0

## 2018-06-15 LAB
A/G RATIO: 1.1 (ref 0.8–2)
ALBUMIN SERPL-MCNC: 4 G/DL (ref 3.4–4.8)
ALP BLD-CCNC: 113 U/L (ref 25–100)
ALT SERPL-CCNC: <5 U/L (ref 4–36)
ANION GAP SERPL CALCULATED.3IONS-SCNC: 13 MMOL/L (ref 3–16)
AST SERPL-CCNC: 11 U/L (ref 8–33)
BASOPHILS ABSOLUTE: 0.1 K/UL (ref 0–0.1)
BASOPHILS RELATIVE PERCENT: 0.8 %
BILIRUB SERPL-MCNC: 0.3 MG/DL (ref 0.3–1.2)
BUN BLDV-MCNC: 26 MG/DL (ref 6–20)
CALCIUM SERPL-MCNC: 9.4 MG/DL (ref 8.5–10.5)
CHLORIDE BLD-SCNC: 101 MMOL/L (ref 98–107)
CO2: 27 MMOL/L (ref 20–30)
CREAT SERPL-MCNC: 1.9 MG/DL (ref 0.4–1.2)
EOSINOPHILS ABSOLUTE: 0.3 K/UL (ref 0–0.4)
EOSINOPHILS RELATIVE PERCENT: 2.9 %
GFR AFRICAN AMERICAN: 33
GFR NON-AFRICAN AMERICAN: 27
GLOBULIN: 3.5 G/DL
GLUCOSE BLD-MCNC: 102 MG/DL (ref 74–106)
HCT VFR BLD CALC: 30.2 % (ref 37–47)
HEMOGLOBIN: 8.8 G/DL (ref 11.5–16.5)
IMMATURE GRANULOCYTES #: 0.1 K/UL
IMMATURE GRANULOCYTES %: 0.5 % (ref 0–5)
LYMPHOCYTES ABSOLUTE: 1.5 K/UL (ref 1.5–4)
LYMPHOCYTES RELATIVE PERCENT: 15.4 %
MCH RBC QN AUTO: 22.3 PG (ref 27–32)
MCHC RBC AUTO-ENTMCNC: 29.1 G/DL (ref 31–35)
MCV RBC AUTO: 76.5 FL (ref 80–100)
MONOCYTES ABSOLUTE: 0.7 K/UL (ref 0.2–0.8)
MONOCYTES RELATIVE PERCENT: 7 %
NEUTROPHILS ABSOLUTE: 7.1 K/UL (ref 2–7.5)
NEUTROPHILS RELATIVE PERCENT: 73.4 %
PDW BLD-RTO: 18.7 % (ref 11–16)
PLATELET # BLD: 313 K/UL (ref 150–400)
PMV BLD AUTO: 11 FL (ref 6–10)
POTASSIUM SERPL-SCNC: 5.6 MMOL/L (ref 3.4–5.1)
RBC # BLD: 3.95 M/UL (ref 3.8–5.8)
SODIUM BLD-SCNC: 141 MMOL/L (ref 136–145)
TOTAL PROTEIN: 7.5 G/DL (ref 6.4–8.3)
WBC # BLD: 9.7 K/UL (ref 4–11)

## 2018-06-15 RX ORDER — BLOOD-GLUCOSE METER
1 KIT MISCELLANEOUS DAILY PRN
Qty: 1 KIT | Refills: 0 | Status: SHIPPED | OUTPATIENT
Start: 2018-06-15 | End: 2019-06-11 | Stop reason: SDUPTHER

## 2018-06-26 ENCOUNTER — OFFICE VISIT (OUTPATIENT)
Dept: PRIMARY CARE CLINIC | Age: 58
End: 2018-06-26
Payer: MEDICARE

## 2018-06-26 VITALS
OXYGEN SATURATION: 97 % | WEIGHT: 293 LBS | BODY MASS INDEX: 47.93 KG/M2 | HEART RATE: 100 BPM | DIASTOLIC BLOOD PRESSURE: 80 MMHG | SYSTOLIC BLOOD PRESSURE: 140 MMHG

## 2018-06-26 DIAGNOSIS — E11.9 TYPE 2 DIABETES MELLITUS WITHOUT COMPLICATION, WITH LONG-TERM CURRENT USE OF INSULIN (HCC): ICD-10-CM

## 2018-06-26 DIAGNOSIS — R11.0 NAUSEA: ICD-10-CM

## 2018-06-26 DIAGNOSIS — L03.119 CELLULITIS OF LOWER EXTREMITY, UNSPECIFIED LATERALITY: Primary | ICD-10-CM

## 2018-06-26 DIAGNOSIS — I87.2 VENOUS STASIS DERMATITIS OF BOTH LOWER EXTREMITIES: ICD-10-CM

## 2018-06-26 DIAGNOSIS — Z79.4 TYPE 2 DIABETES MELLITUS WITHOUT COMPLICATION, WITH LONG-TERM CURRENT USE OF INSULIN (HCC): ICD-10-CM

## 2018-06-26 DIAGNOSIS — T75.3XXS MOTION SICKNESS, SEQUELA: ICD-10-CM

## 2018-06-26 PROCEDURE — G8417 CALC BMI ABV UP PARAM F/U: HCPCS | Performed by: NURSE PRACTITIONER

## 2018-06-26 PROCEDURE — 3017F COLORECTAL CA SCREEN DOC REV: CPT | Performed by: NURSE PRACTITIONER

## 2018-06-26 PROCEDURE — 3045F PR MOST RECENT HEMOGLOBIN A1C LEVEL 7.0-9.0%: CPT | Performed by: NURSE PRACTITIONER

## 2018-06-26 PROCEDURE — 1036F TOBACCO NON-USER: CPT | Performed by: NURSE PRACTITIONER

## 2018-06-26 PROCEDURE — 29580 STRAPPING UNNA BOOT: CPT | Performed by: NURSE PRACTITIONER

## 2018-06-26 PROCEDURE — 2022F DILAT RTA XM EVC RTNOPTHY: CPT | Performed by: NURSE PRACTITIONER

## 2018-06-26 PROCEDURE — 99213 OFFICE O/P EST LOW 20 MIN: CPT | Performed by: NURSE PRACTITIONER

## 2018-06-26 PROCEDURE — G8427 DOCREV CUR MEDS BY ELIG CLIN: HCPCS | Performed by: NURSE PRACTITIONER

## 2018-06-26 RX ORDER — MECLIZINE HYDROCHLORIDE 25 MG/1
25 TABLET ORAL 3 TIMES DAILY PRN
Qty: 30 TABLET | Refills: 5 | Status: SHIPPED | OUTPATIENT
Start: 2018-06-26 | End: 2018-11-16 | Stop reason: SDUPTHER

## 2018-06-26 RX ORDER — CLINDAMYCIN HYDROCHLORIDE 300 MG/1
300 CAPSULE ORAL 3 TIMES DAILY
Qty: 42 CAPSULE | Refills: 0 | Status: SHIPPED | OUTPATIENT
Start: 2018-06-26 | End: 2018-07-10

## 2018-06-26 RX ORDER — GLIPIZIDE 10 MG/1
TABLET ORAL
Qty: 360 TABLET | Refills: 5 | Status: ON HOLD | OUTPATIENT
Start: 2018-06-26 | End: 2019-02-05

## 2018-06-26 RX ORDER — PROMETHAZINE HYDROCHLORIDE 25 MG/1
25 TABLET ORAL EVERY 6 HOURS PRN
Qty: 30 TABLET | Refills: 1 | Status: SHIPPED | OUTPATIENT
Start: 2018-06-26 | End: 2018-07-03

## 2018-06-26 ASSESSMENT — ENCOUNTER SYMPTOMS
SORE THROAT: 0
ABDOMINAL PAIN: 0
COUGH: 0
SHORTNESS OF BREATH: 0
VOMITING: 0
NAUSEA: 0
EYE PAIN: 0

## 2018-07-26 ENCOUNTER — OFFICE VISIT (OUTPATIENT)
Dept: PRIMARY CARE CLINIC | Age: 58
End: 2018-07-26
Payer: MEDICARE

## 2018-07-26 VITALS — HEART RATE: 94 BPM | SYSTOLIC BLOOD PRESSURE: 140 MMHG | DIASTOLIC BLOOD PRESSURE: 70 MMHG | OXYGEN SATURATION: 97 %

## 2018-07-26 DIAGNOSIS — Z79.4 TYPE 2 DIABETES MELLITUS WITH COMPLICATION, WITH LONG-TERM CURRENT USE OF INSULIN (HCC): ICD-10-CM

## 2018-07-26 DIAGNOSIS — L03.119 CELLULITIS OF LOWER EXTREMITY, UNSPECIFIED LATERALITY: ICD-10-CM

## 2018-07-26 DIAGNOSIS — D64.9 ANEMIA, UNSPECIFIED TYPE: Primary | ICD-10-CM

## 2018-07-26 DIAGNOSIS — E11.8 TYPE 2 DIABETES MELLITUS WITH COMPLICATION, WITH LONG-TERM CURRENT USE OF INSULIN (HCC): ICD-10-CM

## 2018-07-26 DIAGNOSIS — I87.2 VENOUS STASIS DERMATITIS OF BOTH LOWER EXTREMITIES: ICD-10-CM

## 2018-07-26 PROCEDURE — 3045F PR MOST RECENT HEMOGLOBIN A1C LEVEL 7.0-9.0%: CPT | Performed by: NURSE PRACTITIONER

## 2018-07-26 PROCEDURE — 2022F DILAT RTA XM EVC RTNOPTHY: CPT | Performed by: NURSE PRACTITIONER

## 2018-07-26 PROCEDURE — 3017F COLORECTAL CA SCREEN DOC REV: CPT | Performed by: NURSE PRACTITIONER

## 2018-07-26 PROCEDURE — G8427 DOCREV CUR MEDS BY ELIG CLIN: HCPCS | Performed by: NURSE PRACTITIONER

## 2018-07-26 PROCEDURE — 1036F TOBACCO NON-USER: CPT | Performed by: NURSE PRACTITIONER

## 2018-07-26 PROCEDURE — G8417 CALC BMI ABV UP PARAM F/U: HCPCS | Performed by: NURSE PRACTITIONER

## 2018-07-26 PROCEDURE — 29580 STRAPPING UNNA BOOT: CPT | Performed by: NURSE PRACTITIONER

## 2018-07-26 PROCEDURE — 99213 OFFICE O/P EST LOW 20 MIN: CPT | Performed by: NURSE PRACTITIONER

## 2018-07-26 RX ORDER — CLINDAMYCIN HYDROCHLORIDE 300 MG/1
300 CAPSULE ORAL 3 TIMES DAILY
Qty: 90 CAPSULE | Refills: 0 | Status: SHIPPED | OUTPATIENT
Start: 2018-07-26 | End: 2018-08-25

## 2018-07-26 ASSESSMENT — ENCOUNTER SYMPTOMS
SHORTNESS OF BREATH: 0
EYE PAIN: 0
VOMITING: 0
NAUSEA: 0
SORE THROAT: 0
ABDOMINAL PAIN: 0
COUGH: 0

## 2018-08-20 NOTE — PROGRESS NOTES
SUBJECTIVE:    Patient ID: Lane Ruiz is a 59 y.o. female. Medical history Review  Past Medical, Family, and Social History reviewed and does not contribute to the patient presenting condition    Health Maintenance Due   Topic Date Due    Hepatitis C screen  1960    HIV screen  07/14/1973    Diabetic microalbuminuria test  07/14/1976    DTaP/Tdap/Td vaccine (1 - Tdap) 07/14/1977    Pneumococcal med risk (1 of 1 - PPSV23) 07/14/1977    Shingles Vaccine (1 of 2 - 2 Dose Series) 07/14/2008    Diabetic foot exam  11/19/2015    Diabetic retinal exam  11/19/2015    TSH testing  06/30/2017    Cervical cancer screen  11/19/2017    Breast cancer screen  03/24/2018    Colon Cancer Screen FIT/FOBT  06/27/2018        HPI:   Chief Complaint   Patient presents with    Cellulitis     Patient here today for a folllow up on cellulitis. She states it is a little better but she thinks her legs need wrapped again. Denies fever. She states her sugar has been doing pretty good. She has been taking the Clindamycin. Patient's medications, allergies, past medical, surgical, social and family histories were reviewed and updated as appropriate. Review of Systems Reviewed and acurate. See MA note. OBJECTIVE:  BP (!) 140/70 (Site: Right Arm, Position: Sitting, Cuff Size: Large Adult)   Pulse 94   SpO2 97%    Physical Exam   Constitutional: She is oriented to person, place, and time. She appears well-developed and well-nourished. No distress. HENT:   Head: Normocephalic. Right Ear: Tympanic membrane normal.   Left Ear: Tympanic membrane normal.   Mouth/Throat: No oropharyngeal exudate. Eyes: Lids are normal.   Neck: Neck supple. Cardiovascular: Normal rate, regular rhythm and normal heart sounds. Pulmonary/Chest: Effort normal and breath sounds normal.   Abdominal: Soft. Bowel sounds are normal. She exhibits no distension. There is no tenderness. Musculoskeletal: She exhibits edema. BLE with chronic erythema, blistering, and some open sores; swelling and erythema has improved, no weeping noted; UNNA boots applied to both LE   Lymphadenopathy:     She has no cervical adenopathy. Neurological: She is alert and oriented to person, place, and time. Skin: Skin is warm and dry. Psychiatric: She has a normal mood and affect. Vitals reviewed. No results found for requested labs within last 30 days. Hemoglobin A1C (%)   Date Value   03/06/2018 8.8 (H)     Microscopic Examination (no units)   Date Value   04/15/2018 YES     LDL Calculated (mg/dL)   Date Value   03/06/2018 88         Lab Results   Component Value Date    WBC 9.7 06/14/2018    NEUTROABS 7.1 06/14/2018    HGB 8.8 06/14/2018    HCT 30.2 06/14/2018    MCV 76.5 06/14/2018     06/14/2018       Lab Results   Component Value Date    TSH 4.45 06/30/2016       Prior to Visit Medications    Medication Sig Taking?  Authorizing Provider   blood glucose test strips (FREESTYLE TEST STRIPS) strip Daily dx:250.00 Yes JOSE Cooper   clindamycin (CLEOCIN) 300 MG capsule Take 1 capsule by mouth 3 times daily Yes JOSE Cooper   Cyanocobalamin (B-12) 2500 MCG SUBL DISSOLVE 1 TABLET UNDER THE TONGUE ONCE A DAY Yes JOSE Cooper   allopurinol (ZYLOPRIM) 300 MG tablet take 1 tablet by mouth once daily Yes JOSE Cooper   glipiZIDE (GLUCOTROL) 10 MG tablet take 2 tablets by mouth twice a day with BREAKFAST AND SUPPER Yes JOSE Cooper   glucose monitoring kit (FREESTYLE) monitoring kit 1 kit by Does not apply route daily as needed (elevated glucose) Dx e 11.9 Yes JOSE Cooper   loperamide (IMODIUM) 2 MG capsule take 1 capsule by mouth four times a day if needed for diarrhea Yes JOSE Cooper   insulin glargine (BASAGLAR KWIKPEN) 100 UNIT/ML injection pen Inject 30 Units into the skin nightly Yes JOSE Cooper   zinc sulfate (ORAZINC) 220 (50 Zn) MG capsule Take 1 capsule by mouth daily Yes JOSE Alatorre   vitamin C (ASCORBIC ACID) 500 MG tablet Take 1 tablet by mouth daily Yes JOSE Alatorre   simvastatin (ZOCOR) 20 MG tablet take 1 tablet by mouth at bedtime Yes JOSE Alatorre   esomeprazole (NEXIUM) 40 MG delayed release capsule take 1 capsule by mouth once daily Yes JOSE Alatorre   metoprolol succinate (TOPROL XL) 100 MG extended release tablet take 1 tablet by mouth once daily Yes JOSE Alatorre   TRADJENTA 5 MG tablet take 1 tablet by mouth daily Yes JOSE Alatorre   amLODIPine (NORVASC) 5 MG tablet take 1 tablet by mouth once daily Yes JOSE Alatorre   meclizine (ANTIVERT) 25 MG tablet take 1 tablet by mouth three times a day if needed for motion sickness Yes JOSE Alatorre RA VITAMIN D-3 2000 units CAPS take 1 capsule by mouth once daily Yes JOSE Alatorre   Insulin Pen Needle 32G X 4 MM MISC 1 each by Does not apply route daily Dx: DM2 Yes JOSE Alatorre RA ASPIRIN EC 81 MG EC tablet take 1 tablet by mouth once daily Yes JOSE Alatorre   furosemide (LASIX) 20 MG tablet TAKE 2 TABLETS (40MG) BY MOUTH DAILY Yes JOSE Alatorre   insulin detemir (LEVEMIR) 100 UNIT/ML injection vial Inject 25 Units into the skin nightly Sub for Lantus Yes JOSE Alatorre   ranitidine (ZANTAC) 300 MG tablet take 1 tablet by mouth twice a day Yes JOSE Alatorre   omeprazole (PRILOSEC) 40 MG delayed release capsule Take 1 capsule by mouth daily For stomach D/C Ranitidine Yes JOSE Alatorre   furosemide (LASIX) 40 MG tablet take 1 tablet by mouth once daily Yes JOSE Alatorre   dapagliflozin (FARXIGA) 5 MG tablet Take 1 tablet by mouth every morning Yes JOSE Alatorre   hydrOXYzine (ATARAX) 25 MG tablet Take 1 tablet by mouth every 8 hours as needed for Itching Yes JOSE Alatorre   promethazine (PHENERGAN) 25 MG tablet Take 1 tablet by mouth every 6 hours as needed for Nausea Yes Gilma Dorman APRN   lidocaine (LIDODERM) 5 % Place 1 patch onto the skin daily 12 hours on, 12 hours off. Yes Nelsy [place], APRN   gabapentin (NEURONTIN) 600 MG tablet take 1 tablet by mouth three times a day  Nelsy [place], APRN   diclofenac sodium (VOLTAREN) 1 % GEL Apply 4 g topically 4 times daily  Nelsy [place], APRN       ASSESSMENT:  1. Anemia, unspecified type    2. Type 2 diabetes mellitus with complication, with long-term current use of insulin ([facility])    3. Cellulitis of lower extremity, unspecified laterality    4. Venous stasis dermatitis of both lower extremities          PLAN:    Orders Placed This Encounter   Medications    blood glucose test strips (FREESTYLE TEST STRIPS) strip     Sig: Daily dx:250.00     Dispense:  100 each     Refill:  5    clindamycin (CLEOCIN) 300 MG capsule     Sig: Take 1 capsule by mouth 3 times daily     Dispense:  90 capsule     Refill:  0     Orders Placed This Encounter   Procedures    CBC WITH AUTO DIFFERENTIAL    COMPREHENSIVE METABOLIC PANEL    HEMOGLOBIN A1C     Continue Clindamycin. Return in about 4 weeks (around 8/23/2018).

## 2018-08-24 ENCOUNTER — OFFICE VISIT (OUTPATIENT)
Dept: PRIMARY CARE CLINIC | Age: 58
End: 2018-08-24
Payer: MEDICARE

## 2018-08-24 ENCOUNTER — HOSPITAL ENCOUNTER (OUTPATIENT)
Facility: HOSPITAL | Age: 58
Discharge: HOME | End: 2018-08-24
Payer: MEDICARE

## 2018-08-24 VITALS
DIASTOLIC BLOOD PRESSURE: 70 MMHG | WEIGHT: 293 LBS | HEART RATE: 105 BPM | OXYGEN SATURATION: 97 % | BODY MASS INDEX: 48.55 KG/M2 | SYSTOLIC BLOOD PRESSURE: 136 MMHG

## 2018-08-24 DIAGNOSIS — E11.8 TYPE 2 DIABETES MELLITUS WITH COMPLICATION, WITH LONG-TERM CURRENT USE OF INSULIN (HCC): ICD-10-CM

## 2018-08-24 DIAGNOSIS — Z79.4 TYPE 2 DIABETES MELLITUS WITH COMPLICATION, WITH LONG-TERM CURRENT USE OF INSULIN (HCC): ICD-10-CM

## 2018-08-24 DIAGNOSIS — L03.119 CELLULITIS OF LOWER EXTREMITY, UNSPECIFIED LATERALITY: ICD-10-CM

## 2018-08-24 DIAGNOSIS — S60.511A CAT SCRATCH OF RIGHT HAND, INITIAL ENCOUNTER: ICD-10-CM
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
## 2018-08-24 DIAGNOSIS — M10.9 GOUT, UNSPECIFIED CAUSE, UNSPECIFIED CHRONICITY, UNSPECIFIED SITE: ICD-10-CM

## 2018-08-24 DIAGNOSIS — D64.9 ANEMIA, UNSPECIFIED TYPE: ICD-10-CM

## 2018-08-24 DIAGNOSIS — R79.89 ELEVATED SERUM CREATININE: ICD-10-CM

## 2018-08-24 DIAGNOSIS — M10.9 GOUT, UNSPECIFIED CAUSE, UNSPECIFIED CHRONICITY, UNSPECIFIED SITE: Primary | ICD-10-CM

## 2018-08-24 LAB
A/G RATIO: 1.3 (ref 0.8–2)
ALBUMIN SERPL-MCNC: 4.3 G/DL (ref 3.4–4.8)
ALP BLD-CCNC: 131 U/L (ref 25–100)
ALT SERPL-CCNC: 8 U/L (ref 4–36)
ANION GAP SERPL CALCULATED.3IONS-SCNC: 16 MMOL/L (ref 3–16)
AST SERPL-CCNC: 11 U/L (ref 8–33)
BASOPHILS ABSOLUTE: 0.1 K/UL (ref 0–0.1)
BASOPHILS RELATIVE PERCENT: 0.5 %
BILIRUB SERPL-MCNC: 0.3 MG/DL (ref 0.3–1.2)
BUN BLDV-MCNC: 22 MG/DL (ref 6–20)
CALCIUM SERPL-MCNC: 9.8 MG/DL (ref 8.5–10.5)
CHLORIDE BLD-SCNC: 101 MMOL/L (ref 98–107)
CO2: 26 MMOL/L (ref 20–30)
CREAT SERPL-MCNC: 1.6 MG/DL (ref 0.4–1.2)
EOSINOPHILS ABSOLUTE: 0.4 K/UL (ref 0–0.4)
EOSINOPHILS RELATIVE PERCENT: 4 %
GFR AFRICAN AMERICAN: 40
GFR NON-AFRICAN AMERICAN: 33
GLOBULIN: 3.3 G/DL
GLUCOSE BLD-MCNC: 129 MG/DL (ref 74–106)
HBA1C MFR BLD: 7.6 %
HCT VFR BLD CALC: 30.6 % (ref 37–47)
HEMOGLOBIN: 8.8 G/DL (ref 11.5–16.5)
IMMATURE GRANULOCYTES #: 0 K/UL
IMMATURE GRANULOCYTES %: 0.4 % (ref 0–5)
LYMPHOCYTES ABSOLUTE: 1.7 K/UL (ref 1.5–4)
LYMPHOCYTES RELATIVE PERCENT: 17 %
MCH RBC QN AUTO: 21.7 PG (ref 27–32)
MCHC RBC AUTO-ENTMCNC: 28.8 G/DL (ref 31–35)
MCV RBC AUTO: 75.4 FL (ref 80–100)
MONOCYTES ABSOLUTE: 0.6 K/UL (ref 0.2–0.8)
MONOCYTES RELATIVE PERCENT: 5.6 %
NEUTROPHILS ABSOLUTE: 7.3 K/UL (ref 2–7.5)
NEUTROPHILS RELATIVE PERCENT: 72.5 %
PDW BLD-RTO: 19.3 % (ref 11–16)
PLATELET # BLD: 296 K/UL (ref 150–400)
PMV BLD AUTO: 10.8 FL (ref 6–10)
POTASSIUM SERPL-SCNC: 4.5 MMOL/L (ref 3.4–5.1)
RBC # BLD: 4.06 M/UL (ref 3.8–5.8)
SODIUM BLD-SCNC: 143 MMOL/L (ref 136–145)
TOTAL PROTEIN: 7.6 G/DL (ref 6.4–8.3)
URIC ACID, SERUM: 4.9 MG/DL (ref 2.5–7.1)
WBC # BLD: 10.1 K/UL (ref 4–11)

## 2018-08-24 PROCEDURE — 83036 HEMOGLOBIN GLYCOSYLATED A1C: CPT

## 2018-08-24 PROCEDURE — 29580 STRAPPING UNNA BOOT: CPT | Performed by: NURSE PRACTITIONER

## 2018-08-24 PROCEDURE — 85025 COMPLETE CBC W/AUTO DIFF WBC: CPT

## 2018-08-24 PROCEDURE — G8417 CALC BMI ABV UP PARAM F/U: HCPCS | Performed by: NURSE PRACTITIONER

## 2018-08-24 PROCEDURE — 99214 OFFICE O/P EST MOD 30 MIN: CPT | Performed by: NURSE PRACTITIONER

## 2018-08-24 PROCEDURE — 3045F PR MOST RECENT HEMOGLOBIN A1C LEVEL 7.0-9.0%: CPT | Performed by: NURSE PRACTITIONER

## 2018-08-24 PROCEDURE — G8427 DOCREV CUR MEDS BY ELIG CLIN: HCPCS | Performed by: NURSE PRACTITIONER

## 2018-08-24 PROCEDURE — 36415 COLL VENOUS BLD VENIPUNCTURE: CPT

## 2018-08-24 PROCEDURE — 1036F TOBACCO NON-USER: CPT | Performed by: NURSE PRACTITIONER

## 2018-08-24 PROCEDURE — 2022F DILAT RTA XM EVC RTNOPTHY: CPT | Performed by: NURSE PRACTITIONER

## 2018-08-24 PROCEDURE — 3017F COLORECTAL CA SCREEN DOC REV: CPT | Performed by: NURSE PRACTITIONER

## 2018-08-24 PROCEDURE — 84550 ASSAY OF BLOOD/URIC ACID: CPT

## 2018-08-24 PROCEDURE — 80053 COMPREHEN METABOLIC PANEL: CPT

## 2018-08-24 RX ORDER — NYSTATIN 100000 U/G
CREAM TOPICAL
Qty: 60 G | Refills: 5 | Status: SHIPPED | OUTPATIENT
Start: 2018-08-24 | End: 2018-12-18 | Stop reason: SDUPTHER

## 2018-08-24 ASSESSMENT — ENCOUNTER SYMPTOMS
VOMITING: 0
ABDOMINAL PAIN: 0
EYE PAIN: 0
NAUSEA: 0
COUGH: 0
SHORTNESS OF BREATH: 0
SORE THROAT: 0

## 2018-08-24 NOTE — PROGRESS NOTES
Have you seen any other physician or provider since your last visit? no    Have you had any other diagnostic tests since your last visit? no    Have you changed or stopped any medications since your last visit including any over-the-counter medicines, vitamins, or herbal medicines? no     Are you taking all your prescribed medications? Yes  If NO, why? -  N/A      REVIEW OF SYSTEMS:  Review of Systems   Constitutional: Negative for chills and fever. HENT: Negative for ear pain and sore throat. Eyes: Negative for pain and visual disturbance. Respiratory: Negative for cough and shortness of breath. Cardiovascular: Positive for leg swelling. Negative for chest pain and palpitations. Gastrointestinal: Negative for abdominal pain, nausea and vomiting. Genitourinary: Negative for dysuria and hematuria. Musculoskeletal: Negative for joint swelling. Skin: Positive for wound. Negative for rash. Neurological: Negative for dizziness and weakness. Psychiatric/Behavioral: Negative for sleep disturbance.

## 2018-09-04 RX ORDER — GLUCOSAMINE/CHONDR SU A SOD 750-600 MG
TABLET ORAL
Qty: 30 CAPSULE | Refills: 5 | Status: SHIPPED | OUTPATIENT
Start: 2018-09-04 | End: 2019-03-06

## 2018-09-04 RX ORDER — ACETAMINOPHEN/DIPHENHYDRAMINE 500MG-25MG
TABLET ORAL
Qty: 30 TABLET | Refills: 5 | Status: ON HOLD | OUTPATIENT
Start: 2018-09-04 | End: 2018-10-07 | Stop reason: HOSPADM

## 2018-09-07 RX ORDER — FUROSEMIDE 20 MG/1
TABLET ORAL
Qty: 60 TABLET | Refills: 5 | Status: ON HOLD | OUTPATIENT
Start: 2018-09-07 | End: 2019-02-05

## 2018-09-09 NOTE — PROGRESS NOTES
Effort normal and breath sounds normal.   Abdominal: Soft. Bowel sounds are normal. She exhibits no distension. There is no tenderness. Musculoskeletal: She exhibits edema. 2+ pitting edema in the BLE with cauliflower like appearance of the lower leg, erythema, and some scabbed areas- overall improving; UNNA boots applied   Lymphadenopathy:     She has no cervical adenopathy. Neurological: She is alert and oriented to person, place, and time. Skin: Skin is warm and dry. Multiple cat scratches to hand   Psychiatric: She has a normal mood and affect. Vitals reviewed.       Results in Past 30 Days  Result Component Current Result Ref Range Previous Result Ref Range   Alb 4.3 (8/24/2018) 3.4 - 4.8 g/dL Not in Time Range    Albumin/Globulin Ratio 1.3 (8/24/2018) 0.8 - 2.0 Not in Time Range    Alkaline Phosphatase 131 (H) (8/24/2018) 25 - 100 U/L Not in Time Range    ALT 8 (8/24/2018) 4 - 36 U/L Not in Time Range    AST 11 (8/24/2018) 8 - 33 U/L Not in Time Range    BUN 22 (H) (8/24/2018) 6 - 20 mg/dL Not in Time Range    Calcium 9.8 (8/24/2018) 8.5 - 10.5 mg/dL Not in Time Range    Chloride 101 (8/24/2018) 98 - 107 mmol/L Not in Time Range    CO2 26 (8/24/2018) 20 - 30 mmol/L Not in Time Range    CREATININE 1.6 (H) (8/24/2018) 0.4 - 1.2 mg/dL Not in Time Range    GFR  40 (L) (8/24/2018) >59 Not in Time Range    GFR Non- 33 (L) (8/24/2018) >59 Not in Time Range    Globulin 3.3 (8/24/2018) g/dL Not in Time Range    Glucose 129 (H) (8/24/2018) 74 - 106 mg/dL Not in Time Range    Potassium 4.5 (8/24/2018) 3.4 - 5.1 mmol/L Not in Time Range    Sodium 143 (8/24/2018) 136 - 145 mmol/L Not in Time Range    Total Bilirubin 0.3 (8/24/2018) 0.3 - 1.2 mg/dL Not in Time Range    Total Protein 7.6 (8/24/2018) 6.4 - 8.3 g/dL Not in Time Range        Hemoglobin A1C (%)   Date Value   08/24/2018 7.6 (H)     Microscopic Examination (no units)   Date Value   04/15/2018 YES     LDL Calculated (mg/dL)   Date Value   03/06/2018 88         Lab Results   Component Value Date    WBC 10.1 08/24/2018    NEUTROABS 7.3 08/24/2018    HGB 8.8 08/24/2018    HCT 30.6 08/24/2018    MCV 75.4 08/24/2018     08/24/2018       Lab Results   Component Value Date    TSH 4.45 06/30/2016       Prior to Visit Medications    Medication Sig Taking? Authorizing Provider   nystatin (MYCOSTATIN) 190560 UNIT/GM cream Apply topically 2 times daily.  Yes JOSE Ross   blood glucose test strips (FREESTYLE TEST STRIPS) strip Daily dx:250.00 Yes JOSE Ross   Cyanocobalamin (B-12) 2500 MCG SUBL DISSOLVE 1 TABLET UNDER THE TONGUE ONCE A DAY Yes JOSE Ross   allopurinol (ZYLOPRIM) 300 MG tablet take 1 tablet by mouth once daily Yes JOSE Ross   glipiZIDE (GLUCOTROL) 10 MG tablet take 2 tablets by mouth twice a day with BREAKFAST AND SUPPER Yes JOSE Ross   glucose monitoring kit (FREESTYLE) monitoring kit 1 kit by Does not apply route daily as needed (elevated glucose) Dx e 11.9 Yes JOSE Ross   loperamide (IMODIUM) 2 MG capsule take 1 capsule by mouth four times a day if needed for diarrhea Yes JOSE Ross   insulin glargine (BASAGLAR KWIKPEN) 100 UNIT/ML injection pen Inject 30 Units into the skin nightly Yes JOSE Ross   zinc sulfate (ORAZINC) 220 (50 Zn) MG capsule Take 1 capsule by mouth daily Yes JOSE Ross   vitamin C (ASCORBIC ACID) 500 MG tablet Take 1 tablet by mouth daily Yes JOSE Ross   simvastatin (ZOCOR) 20 MG tablet take 1 tablet by mouth at bedtime Yes JOSE Ross   esomeprazole (NEXIUM) 40 MG delayed release capsule take 1 capsule by mouth once daily Yes JOSE Ross   metoprolol succinate (TOPROL XL) 100 MG extended release tablet take 1 tablet by mouth once daily Yes JOSE Ross   TRADJENTA 5 MG tablet take 1 tablet by mouth daily Yes JOSE Ross   amLODIPine (NORVASC) 5 MG tablet take 1 tablet by mouth once daily Yes JOSE Alatorre   meclizine (ANTIVERT) 25 MG tablet take 1 tablet by mouth three times a day if needed for motion sickness Yes JOSE Alatorre   Insulin Pen Needle 32G X 4 MM MISC 1 each by Does not apply route daily Dx: DM2 Yes JOSE Alatorre   insulin detemir (LEVEMIR) 100 UNIT/ML injection vial Inject 25 Units into the skin nightly Sub for Lantus Yes JOSE Alatorre   ranitidine (ZANTAC) 300 MG tablet take 1 tablet by mouth twice a day Yes JOSE Alatorre   omeprazole (PRILOSEC) 40 MG delayed release capsule Take 1 capsule by mouth daily For stomach D/C Ranitidine Yes JOSE Alatorre   furosemide (LASIX) 40 MG tablet take 1 tablet by mouth once daily Yes JOSE Alatorre   dapagliflozin (FARXIGA) 5 MG tablet Take 1 tablet by mouth every morning Yes JOSE Alatorre   hydrOXYzine (ATARAX) 25 MG tablet Take 1 tablet by mouth every 8 hours as needed for Itching Yes JOSE Alatorre   promethazine (PHENERGAN) 25 MG tablet Take 1 tablet by mouth every 6 hours as needed for Nausea Yes JOSE Alatorre   lidocaine (LIDODERM) 5 % Place 1 patch onto the skin daily 12 hours on, 12 hours off. Yes JOSE Alatorre   furosemide (LASIX) 20 MG tablet TAKE 2 TABLETS BY MOUTH ONCE A DAY  JOSE Alatorre RA VITAMIN D-3 2000 units CAPS take 1 capsule by mouth once daily  JOSE Alatorre RA ASPIRIN EC 81 MG EC tablet take 1 tablet by mouth once daily  JOSE Alatorre   gabapentin (NEURONTIN) 600 MG tablet take 1 tablet by mouth three times a day  JOSE Alatorre   diclofenac sodium (VOLTAREN) 1 % GEL Apply 4 g topically 4 times daily  JOSE Alatorre       ASSESSMENT:  1. Gout, unspecified cause, unspecified chronicity, unspecified site    2. Cellulitis of lower extremity, unspecified laterality    3.  Type 2 diabetes mellitus with complication, with long-term current use of insulin (HCC)    4. Cat scratch of right hand,

## 2018-10-01 ENCOUNTER — HOSPITAL ENCOUNTER (INPATIENT)
Facility: HOSPITAL | Age: 58
LOS: 5 days | Discharge: NURSING, REHAB OR LONG-TERM CARE | DRG: 812 | End: 2018-10-07
Attending: EMERGENCY MEDICINE | Admitting: INTERNAL MEDICINE
Payer: MEDICARE

## 2018-10-01 ENCOUNTER — APPOINTMENT (OUTPATIENT)
Dept: CT IMAGING | Facility: HOSPITAL | Age: 58
DRG: 812 | End: 2018-10-01
Payer: MEDICARE

## 2018-10-01 ENCOUNTER — APPOINTMENT (OUTPATIENT)
Dept: GENERAL RADIOLOGY | Facility: HOSPITAL | Age: 58
DRG: 812 | End: 2018-10-01
Payer: MEDICARE

## 2018-10-01 DIAGNOSIS — L03.119 CELLULITIS OF LOWER EXTREMITY, UNSPECIFIED LATERALITY: ICD-10-CM

## 2018-10-01 DIAGNOSIS — D64.9 ANEMIA REQUIRING TRANSFUSIONS: Primary | ICD-10-CM

## 2018-10-01 DIAGNOSIS — D72.829 LEUKOCYTOSIS, UNSPECIFIED TYPE: ICD-10-CM

## 2018-10-01 DIAGNOSIS — R79.89 ELEVATED BRAIN NATRIURETIC PEPTIDE (BNP) LEVEL: ICD-10-CM

## 2018-10-01 DIAGNOSIS — E03.9 HYPOTHYROIDISM, UNSPECIFIED TYPE: ICD-10-CM

## 2018-10-01 DIAGNOSIS — R10.32 ABDOMINAL PAIN, LEFT LOWER QUADRANT: ICD-10-CM

## 2018-10-01 DIAGNOSIS — N18.9 CHRONIC RENAL IMPAIRMENT, UNSPECIFIED CKD STAGE: ICD-10-CM

## 2018-10-01 LAB
A/G RATIO: 0.9 (ref 0.8–2)
ALBUMIN SERPL-MCNC: 3.4 G/DL (ref 3.4–4.8)
ALP BLD-CCNC: 184 U/L (ref 25–100)
ALT SERPL-CCNC: 16 U/L (ref 4–36)
ANION GAP SERPL CALCULATED.3IONS-SCNC: 12 MMOL/L (ref 3–16)
AST SERPL-CCNC: 14 U/L (ref 8–33)
BASOPHILS ABSOLUTE: 0 K/UL (ref 0–0.1)
BASOPHILS RELATIVE PERCENT: 0.2 %
BILIRUB SERPL-MCNC: 0.5 MG/DL (ref 0.3–1.2)
BUN BLDV-MCNC: 31 MG/DL (ref 6–20)
CALCIUM SERPL-MCNC: 9.2 MG/DL (ref 8.5–10.5)
CHLORIDE BLD-SCNC: 101 MMOL/L (ref 98–107)
CO2: 23 MMOL/L (ref 20–30)
CREAT SERPL-MCNC: 2 MG/DL (ref 0.4–1.2)
EOSINOPHILS ABSOLUTE: 0 K/UL (ref 0–0.4)
EOSINOPHILS RELATIVE PERCENT: 0.2 %
GFR AFRICAN AMERICAN: 31
GFR NON-AFRICAN AMERICAN: 25
GLOBULIN: 3.9 G/DL
GLUCOSE BLD-MCNC: 139 MG/DL (ref 74–106)
HCT VFR BLD CALC: 23.5 % (ref 37–47)
HEMOGLOBIN: 6.9 G/DL (ref 11.5–16.5)
IMMATURE GRANULOCYTES #: 0.1 K/UL
IMMATURE GRANULOCYTES %: 0.6 % (ref 0–5)
LIPASE: 12 U/L (ref 5.6–51.3)
LYMPHOCYTES ABSOLUTE: 0.4 K/UL (ref 1.5–4)
LYMPHOCYTES RELATIVE PERCENT: 2.8 %
MCH RBC QN AUTO: 21.8 PG (ref 27–32)
MCHC RBC AUTO-ENTMCNC: 29.4 G/DL (ref 31–35)
MCV RBC AUTO: 74.4 FL (ref 80–100)
MONOCYTES ABSOLUTE: 0.4 K/UL (ref 0.2–0.8)
MONOCYTES RELATIVE PERCENT: 2.7 %
NEUTROPHILS ABSOLUTE: 14.7 K/UL (ref 2–7.5)
NEUTROPHILS RELATIVE PERCENT: 93.5 %
PDW BLD-RTO: 20.7 % (ref 11–16)
PLATELET # BLD: 186 K/UL (ref 150–400)
PMV BLD AUTO: 10.2 FL (ref 6–10)
POTASSIUM SERPL-SCNC: 4.5 MMOL/L (ref 3.4–5.1)
PRO-BNP: 7352 PG/ML (ref 0–1800)
RBC # BLD: 3.16 M/UL (ref 3.8–5.8)
SODIUM BLD-SCNC: 136 MMOL/L (ref 136–145)
TOTAL PROTEIN: 7.3 G/DL (ref 6.4–8.3)
TROPONIN: <0.3 NG/ML
WBC # BLD: 15.7 K/UL (ref 4–11)

## 2018-10-01 PROCEDURE — 86920 COMPATIBILITY TEST SPIN: CPT

## 2018-10-01 PROCEDURE — 84484 ASSAY OF TROPONIN QUANT: CPT

## 2018-10-01 PROCEDURE — 83540 ASSAY OF IRON: CPT

## 2018-10-01 PROCEDURE — G0328 FECAL BLOOD SCRN IMMUNOASSAY: HCPCS

## 2018-10-01 PROCEDURE — 99285 EMERGENCY DEPT VISIT HI MDM: CPT

## 2018-10-01 PROCEDURE — 84443 ASSAY THYROID STIM HORMONE: CPT

## 2018-10-01 PROCEDURE — P9016 RBC LEUKOCYTES REDUCED: HCPCS

## 2018-10-01 PROCEDURE — 80053 COMPREHEN METABOLIC PANEL: CPT

## 2018-10-01 PROCEDURE — 82728 ASSAY OF FERRITIN: CPT

## 2018-10-01 PROCEDURE — 86900 BLOOD TYPING SEROLOGIC ABO: CPT

## 2018-10-01 PROCEDURE — 86901 BLOOD TYPING SEROLOGIC RH(D): CPT

## 2018-10-01 PROCEDURE — 93005 ELECTROCARDIOGRAM TRACING: CPT

## 2018-10-01 PROCEDURE — 82607 VITAMIN B-12: CPT

## 2018-10-01 PROCEDURE — 85025 COMPLETE CBC W/AUTO DIFF WBC: CPT

## 2018-10-01 PROCEDURE — 82746 ASSAY OF FOLIC ACID SERUM: CPT

## 2018-10-01 PROCEDURE — 74176 CT ABD & PELVIS W/O CONTRAST: CPT

## 2018-10-01 PROCEDURE — 86850 RBC ANTIBODY SCREEN: CPT

## 2018-10-01 PROCEDURE — 83550 IRON BINDING TEST: CPT

## 2018-10-01 PROCEDURE — 87040 BLOOD CULTURE FOR BACTERIA: CPT

## 2018-10-01 PROCEDURE — 36415 COLL VENOUS BLD VENIPUNCTURE: CPT

## 2018-10-01 PROCEDURE — 71045 X-RAY EXAM CHEST 1 VIEW: CPT

## 2018-10-01 PROCEDURE — 83690 ASSAY OF LIPASE: CPT

## 2018-10-01 PROCEDURE — 83880 ASSAY OF NATRIURETIC PEPTIDE: CPT

## 2018-10-01 RX ORDER — 0.9 % SODIUM CHLORIDE 0.9 %
250 INTRAVENOUS SOLUTION INTRAVENOUS ONCE
Status: DISCONTINUED | OUTPATIENT
Start: 2018-10-01 | End: 2018-10-07 | Stop reason: HOSPADM

## 2018-10-01 ASSESSMENT — PAIN DESCRIPTION - LOCATION: LOCATION: ABDOMEN;HEAD;FOOT

## 2018-10-01 ASSESSMENT — PAIN SCALES - GENERAL: PAINLEVEL_OUTOF10: 8

## 2018-10-01 ASSESSMENT — PAIN DESCRIPTION - PAIN TYPE: TYPE: ACUTE PAIN

## 2018-10-01 ASSESSMENT — PAIN DESCRIPTION - ORIENTATION: ORIENTATION: RIGHT;LEFT

## 2018-10-01 ASSESSMENT — PAIN DESCRIPTION - DESCRIPTORS: DESCRIPTORS: ACHING

## 2018-10-02 PROBLEM — L03.116 CELLULITIS OF BOTH LOWER EXTREMITIES: Status: ACTIVE | Noted: 2018-10-02

## 2018-10-02 PROBLEM — D64.9 ANEMIA: Status: ACTIVE | Noted: 2018-10-02

## 2018-10-02 PROBLEM — L03.115 CELLULITIS OF BOTH LOWER EXTREMITIES: Status: ACTIVE | Noted: 2018-10-02

## 2018-10-02 LAB
ABO/RH: NORMAL
AMORPHOUS: ABNORMAL /HPF
ANTIBODY SCREEN: NORMAL
BILIRUBIN URINE: NEGATIVE
BLOOD BANK DISPENSE STATUS: NORMAL
BLOOD BANK DISPENSE STATUS: NORMAL
BLOOD BANK PRODUCT CODE: NORMAL
BLOOD BANK PRODUCT CODE: NORMAL
BLOOD, URINE: ABNORMAL
BPU ID: NORMAL
BPU ID: NORMAL
CLARITY: CLEAR
COLOR: YELLOW
COMPONENT: NORMAL
COMPONENT: NORMAL
DONOR TYPE/RH: NORMAL
DONOR TYPE/RH: NORMAL
EPITHELIAL CELLS, UA: ABNORMAL /HPF
FERRITIN: 141.2 NG/ML (ref 22–322)
FOLATE: 17.14 NG/ML
GLUCOSE BLD-MCNC: 137 MG/DL (ref 74–106)
GLUCOSE BLD-MCNC: 141 MG/DL (ref 74–106)
GLUCOSE BLD-MCNC: 221 MG/DL (ref 74–106)
GLUCOSE BLD-MCNC: 222 MG/DL (ref 74–106)
GLUCOSE BLD-MCNC: 250 MG/DL (ref 74–106)
GLUCOSE URINE: NEGATIVE MG/DL
HOLLISTER NO: NORMAL
HOLLISTER NO: NORMAL
IRON SATURATION: 4 % (ref 15–50)
IRON: 10 UG/DL (ref 37–145)
KETONES, URINE: ABNORMAL MG/DL
LEUKOCYTE ESTERASE, URINE: NEGATIVE
LV EF: 55 %
LVEF MODALITY: NORMAL
MICROSCOPIC EXAMINATION: YES
MUCUS: ABNORMAL /LPF
NITRITE, URINE: NEGATIVE
OCCULT BLOOD SCREENING: NORMAL
PERFORMED ON: ABNORMAL
PH UA: 5.5
PROTEIN UA: >=300 MG/DL
RBC UA: ABNORMAL /HPF (ref 0–2)
SPECIFIC GRAVITY UA: 1.02
TOTAL IRON BINDING CAPACITY: 254 UG/DL (ref 250–450)
TSH SERPL DL<=0.05 MIU/L-ACNC: 9.24 UIU/ML (ref 0.35–5.5)
URINE REFLEX TO CULTURE: ABNORMAL
URINE TYPE: ABNORMAL
UROBILINOGEN, URINE: 0.2 E.U./DL
VANCOMYCIN TROUGH: 9.8 UG/ML (ref 5–15)
VITAMIN B-12: >2000 PG/ML (ref 211–911)
WBC UA: ABNORMAL /HPF (ref 0–5)

## 2018-10-02 PROCEDURE — 36430 TRANSFUSION BLD/BLD COMPNT: CPT

## 2018-10-02 PROCEDURE — 6370000000 HC RX 637 (ALT 250 FOR IP): Performed by: NURSE PRACTITIONER

## 2018-10-02 PROCEDURE — 99222 1ST HOSP IP/OBS MODERATE 55: CPT | Performed by: INTERNAL MEDICINE

## 2018-10-02 PROCEDURE — 94760 N-INVAS EAR/PLS OXIMETRY 1: CPT

## 2018-10-02 PROCEDURE — G8978 MOBILITY CURRENT STATUS: HCPCS

## 2018-10-02 PROCEDURE — P9016 RBC LEUKOCYTES REDUCED: HCPCS

## 2018-10-02 PROCEDURE — G8979 MOBILITY GOAL STATUS: HCPCS

## 2018-10-02 PROCEDURE — 6360000002 HC RX W HCPCS: Performed by: EMERGENCY MEDICINE

## 2018-10-02 PROCEDURE — 1200000000 HC SEMI PRIVATE

## 2018-10-02 PROCEDURE — 6360000002 HC RX W HCPCS

## 2018-10-02 PROCEDURE — 2580000003 HC RX 258: Performed by: NURSE PRACTITIONER

## 2018-10-02 PROCEDURE — G8988 SELF CARE GOAL STATUS: HCPCS

## 2018-10-02 PROCEDURE — G8987 SELF CARE CURRENT STATUS: HCPCS

## 2018-10-02 PROCEDURE — 93306 TTE W/DOPPLER COMPLETE: CPT

## 2018-10-02 PROCEDURE — 97162 PT EVAL MOD COMPLEX 30 MIN: CPT

## 2018-10-02 PROCEDURE — 6370000000 HC RX 637 (ALT 250 FOR IP): Performed by: INTERNAL MEDICINE

## 2018-10-02 PROCEDURE — 6360000002 HC RX W HCPCS: Performed by: NURSE PRACTITIONER

## 2018-10-02 PROCEDURE — 97166 OT EVAL MOD COMPLEX 45 MIN: CPT

## 2018-10-02 PROCEDURE — 96374 THER/PROPH/DIAG INJ IV PUSH: CPT

## 2018-10-02 PROCEDURE — 80202 ASSAY OF VANCOMYCIN: CPT

## 2018-10-02 PROCEDURE — 81001 URINALYSIS AUTO W/SCOPE: CPT

## 2018-10-02 PROCEDURE — 97530 THERAPEUTIC ACTIVITIES: CPT

## 2018-10-02 PROCEDURE — 6370000000 HC RX 637 (ALT 250 FOR IP): Performed by: EMERGENCY MEDICINE

## 2018-10-02 PROCEDURE — 96375 TX/PRO/DX INJ NEW DRUG ADDON: CPT

## 2018-10-02 PROCEDURE — 97802 MEDICAL NUTRITION INDIV IN: CPT

## 2018-10-02 PROCEDURE — 6360000002 HC RX W HCPCS: Performed by: INTERNAL MEDICINE

## 2018-10-02 PROCEDURE — 36415 COLL VENOUS BLD VENIPUNCTURE: CPT

## 2018-10-02 RX ORDER — AMLODIPINE BESYLATE 5 MG/1
5 TABLET ORAL DAILY
Status: DISCONTINUED | OUTPATIENT
Start: 2018-10-02 | End: 2018-10-07

## 2018-10-02 RX ORDER — ASPIRIN 81 MG/1
81 TABLET ORAL DAILY
Status: DISCONTINUED | OUTPATIENT
Start: 2018-10-02 | End: 2018-10-06

## 2018-10-02 RX ORDER — INSULIN GLARGINE 100 [IU]/ML
35 INJECTION, SOLUTION SUBCUTANEOUS NIGHTLY
Status: DISCONTINUED | OUTPATIENT
Start: 2018-10-02 | End: 2018-10-07 | Stop reason: HOSPADM

## 2018-10-02 RX ORDER — LIDOCAINE 50 MG/G
1 PATCH TOPICAL DAILY
Status: DISCONTINUED | OUTPATIENT
Start: 2018-10-02 | End: 2018-10-07 | Stop reason: HOSPADM

## 2018-10-02 RX ORDER — DIPHENHYDRAMINE HCL 25 MG
25 CAPSULE ORAL ONCE
Status: COMPLETED | OUTPATIENT
Start: 2018-10-02 | End: 2018-10-02

## 2018-10-02 RX ORDER — SODIUM CHLORIDE 0.9 % (FLUSH) 0.9 %
10 SYRINGE (ML) INJECTION EVERY 12 HOURS SCHEDULED
Status: DISCONTINUED | OUTPATIENT
Start: 2018-10-02 | End: 2018-10-07 | Stop reason: HOSPADM

## 2018-10-02 RX ORDER — SODIUM CHLORIDE 0.9 % (FLUSH) 0.9 %
10 SYRINGE (ML) INJECTION PRN
Status: DISCONTINUED | OUTPATIENT
Start: 2018-10-02 | End: 2018-10-07 | Stop reason: HOSPADM

## 2018-10-02 RX ORDER — MORPHINE SULFATE 8 MG/ML
8 INJECTION, SOLUTION INTRAMUSCULAR; INTRAVENOUS ONCE
Status: COMPLETED | OUTPATIENT
Start: 2018-10-02 | End: 2018-10-02

## 2018-10-02 RX ORDER — FUROSEMIDE 40 MG/1
40 TABLET ORAL DAILY
Status: DISCONTINUED | OUTPATIENT
Start: 2018-10-02 | End: 2018-10-07 | Stop reason: HOSPADM

## 2018-10-02 RX ORDER — DEXTROSE MONOHYDRATE 25 G/50ML
12.5 INJECTION, SOLUTION INTRAVENOUS PRN
Status: DISCONTINUED | OUTPATIENT
Start: 2018-10-02 | End: 2018-10-07 | Stop reason: HOSPADM

## 2018-10-02 RX ORDER — FAMOTIDINE 20 MG/1
20 TABLET, FILM COATED ORAL 2 TIMES DAILY
Status: DISCONTINUED | OUTPATIENT
Start: 2018-10-02 | End: 2018-10-07 | Stop reason: HOSPADM

## 2018-10-02 RX ORDER — ACETAMINOPHEN 325 MG/1
650 TABLET ORAL ONCE
Status: COMPLETED | OUTPATIENT
Start: 2018-10-02 | End: 2018-10-02

## 2018-10-02 RX ORDER — SIMVASTATIN 20 MG
20 TABLET ORAL NIGHTLY
Status: DISCONTINUED | OUTPATIENT
Start: 2018-10-02 | End: 2018-10-07 | Stop reason: HOSPADM

## 2018-10-02 RX ORDER — VANCOMYCIN HYDROCHLORIDE 1 G/200ML
1000 INJECTION, SOLUTION INTRAVENOUS ONCE
Status: COMPLETED | OUTPATIENT
Start: 2018-10-02 | End: 2018-10-02

## 2018-10-02 RX ORDER — NICOTINE POLACRILEX 4 MG
15 LOZENGE BUCCAL PRN
Status: DISCONTINUED | OUTPATIENT
Start: 2018-10-02 | End: 2018-10-07 | Stop reason: HOSPADM

## 2018-10-02 RX ORDER — GABAPENTIN 300 MG/1
300 CAPSULE ORAL 3 TIMES DAILY
Status: DISCONTINUED | OUTPATIENT
Start: 2018-10-02 | End: 2018-10-07 | Stop reason: HOSPADM

## 2018-10-02 RX ORDER — DEXTROSE MONOHYDRATE 50 MG/ML
100 INJECTION, SOLUTION INTRAVENOUS PRN
Status: DISCONTINUED | OUTPATIENT
Start: 2018-10-02 | End: 2018-10-07 | Stop reason: HOSPADM

## 2018-10-02 RX ORDER — FUROSEMIDE 10 MG/ML
40 INJECTION INTRAMUSCULAR; INTRAVENOUS ONCE
Status: COMPLETED | OUTPATIENT
Start: 2018-10-02 | End: 2018-10-02

## 2018-10-02 RX ORDER — DIPHENHYDRAMINE HCL 25 MG
25 TABLET ORAL ONCE
Status: DISCONTINUED | OUTPATIENT
Start: 2018-10-02 | End: 2018-10-02

## 2018-10-02 RX ORDER — FLUCONAZOLE 100 MG/1
200 TABLET ORAL ONCE
Status: COMPLETED | OUTPATIENT
Start: 2018-10-02 | End: 2018-10-02

## 2018-10-02 RX ORDER — GLIPIZIDE 5 MG/1
5 TABLET ORAL
Status: DISCONTINUED | OUTPATIENT
Start: 2018-10-02 | End: 2018-10-07 | Stop reason: HOSPADM

## 2018-10-02 RX ORDER — ZINC SULFATE 50(220)MG
220 CAPSULE ORAL DAILY
Status: DISCONTINUED | OUTPATIENT
Start: 2018-10-02 | End: 2018-10-07 | Stop reason: HOSPADM

## 2018-10-02 RX ORDER — LEVOTHYROXINE SODIUM 0.05 MG/1
50 TABLET ORAL DAILY
Status: DISCONTINUED | OUTPATIENT
Start: 2018-10-02 | End: 2018-10-07 | Stop reason: HOSPADM

## 2018-10-02 RX ORDER — VANCOMYCIN HYDROCHLORIDE 1 G/200ML
1000 INJECTION, SOLUTION INTRAVENOUS ONCE
Status: COMPLETED | OUTPATIENT
Start: 2018-10-02 | End: 2018-10-03

## 2018-10-02 RX ORDER — ALLOPURINOL 100 MG/1
300 TABLET ORAL DAILY
Status: DISCONTINUED | OUTPATIENT
Start: 2018-10-02 | End: 2018-10-07 | Stop reason: HOSPADM

## 2018-10-02 RX ORDER — DIPHENHYDRAMINE HCL 25 MG
CAPSULE ORAL
Status: COMPLETED
Start: 2018-10-02 | End: 2018-10-02

## 2018-10-02 RX ORDER — LEVOFLOXACIN 5 MG/ML
750 INJECTION, SOLUTION INTRAVENOUS
Status: DISCONTINUED | OUTPATIENT
Start: 2018-10-02 | End: 2018-10-07 | Stop reason: HOSPADM

## 2018-10-02 RX ORDER — NYSTATIN 100000 U/G
CREAM TOPICAL 2 TIMES DAILY
Status: DISCONTINUED | OUTPATIENT
Start: 2018-10-02 | End: 2018-10-07 | Stop reason: HOSPADM

## 2018-10-02 RX ORDER — ONDANSETRON 2 MG/ML
4 INJECTION INTRAMUSCULAR; INTRAVENOUS EVERY 6 HOURS PRN
Status: DISCONTINUED | OUTPATIENT
Start: 2018-10-02 | End: 2018-10-07 | Stop reason: HOSPADM

## 2018-10-02 RX ORDER — ASCORBIC ACID 500 MG
500 TABLET ORAL DAILY
Status: DISCONTINUED | OUTPATIENT
Start: 2018-10-02 | End: 2018-10-07 | Stop reason: HOSPADM

## 2018-10-02 RX ADMIN — FUROSEMIDE 40 MG: 40 TABLET ORAL at 08:02

## 2018-10-02 RX ADMIN — MORPHINE SULFATE 8 MG: 8 INJECTION INTRAVENOUS at 00:11

## 2018-10-02 RX ADMIN — GABAPENTIN 300 MG: 300 CAPSULE ORAL at 13:49

## 2018-10-02 RX ADMIN — ENOXAPARIN SODIUM 40 MG: 100 INJECTION SUBCUTANEOUS at 08:03

## 2018-10-02 RX ADMIN — NYSTATIN: 100000 CREAM TOPICAL at 02:32

## 2018-10-02 RX ADMIN — MICONAZOLE NITRATE: 20 CREAM TOPICAL at 22:38

## 2018-10-02 RX ADMIN — OXYCODONE HYDROCHLORIDE AND ACETAMINOPHEN 500 MG: 500 TABLET ORAL at 08:02

## 2018-10-02 RX ADMIN — Medication 10 ML: at 22:39

## 2018-10-02 RX ADMIN — FAMOTIDINE 20 MG: 20 TABLET ORAL at 08:02

## 2018-10-02 RX ADMIN — INSULIN GLARGINE 35 UNITS: 100 INJECTION, SOLUTION SUBCUTANEOUS at 02:37

## 2018-10-02 RX ADMIN — INSULIN GLARGINE 35 UNITS: 100 INJECTION, SOLUTION SUBCUTANEOUS at 22:45

## 2018-10-02 RX ADMIN — Medication 10 ML: at 08:05

## 2018-10-02 RX ADMIN — NYSTATIN: 100000 CREAM TOPICAL at 22:38

## 2018-10-02 RX ADMIN — FUROSEMIDE 40 MG: 10 INJECTION, SOLUTION INTRAMUSCULAR; INTRAVENOUS at 00:44

## 2018-10-02 RX ADMIN — FLUCONAZOLE 200 MG: 100 TABLET ORAL at 00:11

## 2018-10-02 RX ADMIN — ASPIRIN 81 MG: 81 TABLET, COATED ORAL at 08:02

## 2018-10-02 RX ADMIN — LEVOTHYROXINE SODIUM 50 MCG: 50 TABLET ORAL at 10:02

## 2018-10-02 RX ADMIN — ALLOPURINOL 300 MG: 100 TABLET ORAL at 08:02

## 2018-10-02 RX ADMIN — INSULIN LISPRO 2 UNITS: 100 INJECTION, SOLUTION INTRAVENOUS; SUBCUTANEOUS at 17:46

## 2018-10-02 RX ADMIN — FAMOTIDINE 20 MG: 20 TABLET ORAL at 02:31

## 2018-10-02 RX ADMIN — ACETAMINOPHEN 650 MG: 325 TABLET, FILM COATED ORAL at 19:10

## 2018-10-02 RX ADMIN — ACETAMINOPHEN 650 MG: 325 TABLET, FILM COATED ORAL at 13:49

## 2018-10-02 RX ADMIN — FAMOTIDINE 20 MG: 20 TABLET ORAL at 22:34

## 2018-10-02 RX ADMIN — DIPHENHYDRAMINE HYDROCHLORIDE 25 MG: 25 CAPSULE ORAL at 19:11

## 2018-10-02 RX ADMIN — Medication 25 MG: at 19:11

## 2018-10-02 RX ADMIN — GABAPENTIN 300 MG: 300 CAPSULE ORAL at 22:34

## 2018-10-02 RX ADMIN — IRON SUCROSE 200 MG: 20 INJECTION, SOLUTION INTRAVENOUS at 10:02

## 2018-10-02 RX ADMIN — LEVOFLOXACIN 750 MG: 5 INJECTION, SOLUTION INTRAVENOUS at 02:31

## 2018-10-02 RX ADMIN — LINAGLIPTIN 5 MG: 5 TABLET, FILM COATED ORAL at 08:02

## 2018-10-02 RX ADMIN — SIMVASTATIN 20 MG: 20 TABLET, FILM COATED ORAL at 22:34

## 2018-10-02 RX ADMIN — GABAPENTIN 300 MG: 300 CAPSULE ORAL at 08:02

## 2018-10-02 RX ADMIN — VANCOMYCIN HYDROCHLORIDE 1000 MG: 1 INJECTION, SOLUTION INTRAVENOUS at 04:35

## 2018-10-02 RX ADMIN — MICONAZOLE NITRATE: 20 CREAM TOPICAL at 08:07

## 2018-10-02 RX ADMIN — DIPHENHYDRAMINE HYDROCHLORIDE 25 MG: 25 CAPSULE ORAL at 13:49

## 2018-10-02 RX ADMIN — MICONAZOLE NITRATE: 20 CREAM TOPICAL at 02:31

## 2018-10-02 RX ADMIN — INSULIN LISPRO 1 UNITS: 100 INJECTION, SOLUTION INTRAVENOUS; SUBCUTANEOUS at 22:44

## 2018-10-02 RX ADMIN — GLIPIZIDE 5 MG: 5 TABLET ORAL at 17:42

## 2018-10-02 RX ADMIN — VITAMIN D, TAB 1000IU (100/BT) 2000 UNITS: 25 TAB at 08:02

## 2018-10-02 RX ADMIN — AMLODIPINE BESYLATE 5 MG: 5 TABLET ORAL at 08:03

## 2018-10-02 RX ADMIN — ZINC SULFATE 220 MG (50 MG) CAPSULE 220 MG: CAPSULE at 08:02

## 2018-10-02 RX ADMIN — SIMVASTATIN 20 MG: 20 TABLET, FILM COATED ORAL at 02:31

## 2018-10-02 RX ADMIN — INSULIN LISPRO 3 UNITS: 100 INJECTION, SOLUTION INTRAVENOUS; SUBCUTANEOUS at 12:22

## 2018-10-02 RX ADMIN — NYSTATIN: 100000 CREAM TOPICAL at 08:07

## 2018-10-02 RX ADMIN — GLIPIZIDE 5 MG: 5 TABLET ORAL at 06:06

## 2018-10-02 RX ADMIN — VANCOMYCIN HYDROCHLORIDE 1000 MG: 1 INJECTION, SOLUTION INTRAVENOUS at 22:34

## 2018-10-02 ASSESSMENT — PAIN SCALES - GENERAL: PAINLEVEL_OUTOF10: 8

## 2018-10-02 NOTE — H&P
History and Physical    Patient:  Rupinder Jason    CHIEF COMPLAINT:    Swelling bilateral lower extremities, \"knees crunching\"    HISTORY OF PRESENT ILLNESS:   The patient is a 59 y.o. female who presents with swelling of both lower extremities and bilateral \"knees crunching. \" patient with history of chronic bilateral lymphedema. States that her legs have became red and have burning, subsequently noting some progressive swelling despite reportedly using fluid pill at home. Reports \"knees crunching\" with movement and has had trouble walking. Denies fever or chills. Denies fall or injury. Does report fatigue. Past Medical History:      Diagnosis Date    GERD (gastroesophageal reflux disease) 12/2013    Severe Reflux Esophagitis and hemorrhagic gastritis    Headache(784.0)     Hypertension     Hypothyroidism     Type II or unspecified type diabetes mellitus without mention of complication, not stated as uncontrolled     Vitamin B12 deficiency        Past Surgical History:      Procedure Laterality Date    EYE SURGERY      LEG SURGERY         Medications Prior to Admission:    Prior to Admission medications    Medication Sig Start Date End Date Taking? Authorizing Provider   furosemide (LASIX) 20 MG tablet TAKE 2 TABLETS BY MOUTH ONCE A DAY 9/7/18  Yes Keane Felty, APRN RA VITAMIN D-3 2000 units CAPS take 1 capsule by mouth once daily 9/4/18  Yes Keane Felty, APRN RA ASPIRIN EC 81 MG EC tablet take 1 tablet by mouth once daily 9/4/18  Yes Keane Felty, APRN   nystatin (MYCOSTATIN) 144948 UNIT/GM cream Apply topically 2 times daily.  8/24/18  Yes Keane Felty, APRN   blood glucose test strips (FREESTYLE TEST STRIPS) strip Daily dx:250.00 7/26/18  Yes Keane Felty, APRN   Cyanocobalamin (B-12) 2500 MCG SUBL DISSOLVE 1 TABLET UNDER THE TONGUE ONCE A DAY 7/19/18  Yes Keane Felty, APRN   allopurinol (ZYLOPRIM) 300 MG tablet take 1 tablet by mouth once daily 6/26/18  Yes Keane Felty, APRN glipiZIDE (GLUCOTROL) 10 MG tablet take 2 tablets by mouth twice a day with BREAKFAST AND SUPPER 6/26/18  Yes JOSE Hampton   glucose monitoring kit (FREESTYLE) monitoring kit 1 kit by Does not apply route daily as needed (elevated glucose) Dx e 11.9 6/15/18  Yes JOSE Hampton   insulin glargine [facility]) 100 UNIT/ML injection pen Inject 30 Units into the skin nightly 6/14/18  Yes JOSE Hampton   zinc sulfate (ORAZINC) 220 (50 Zn) MG capsule Take 1 capsule by mouth daily 6/14/18 6/14/19 Yes JOSE Hampton   vitamin C (ASCORBIC ACID) 500 MG tablet Take 1 tablet by mouth daily 6/14/18  Yes JOSE Hampton   gabapentin (NEURONTIN) 600 MG tablet take 1 tablet by mouth three times a day 5/22/18 10/1/18 Yes JOSE Hampton   simvastatin (ZOCOR) 20 MG tablet take 1 tablet by mouth at bedtime 5/19/18  Yes JOSE Hampton   esomeprazole (Smartaxi) 40 MG delayed release capsule take 1 capsule by mouth once daily 5/19/18  Yes JOSE Hampton   TRADJENTA 5 MG tablet take 1 tablet by mouth daily 4/19/18  Yes JOSE Hampton   amLODIPine (NORVASC) 5 MG tablet take 1 tablet by mouth once daily 4/19/18  Yes JOSE Hampton   Insulin Pen Needle 32G X 4 MM MISC 1 each by Does not apply route daily Dx: DM2 3/6/18  Yes JOSE Hampton   insulin detemir (LEVEMIR) 100 UNIT/ML injection vial Inject 25 Units into the skin nightly Sub for Lantus  Patient taking differently: Inject 35 Units into the skin nightly Sub for Lantus 12/8/17  Yes OJSE Hampton   ranitidine (ZANTAC) 300 MG tablet take 1 tablet by mouth twice a day 6/20/17  Yes JOSE Hampton   dapagliflozin Nichelle Dadds) 5 MG tablet Take 1 tablet by mouth every morning 1/26/17  Yes JOSE Hampton   promethazine (PHENERGAN) 25 MG tablet Take 1 tablet by mouth every 6 hours as needed for Nausea 9/8/15  Yes JOSE Hampton   lidocaine (LIDODERM) 5 % Place 1 patch onto the skin daily 12 hours on, wheezing, rales or rhonchi detected  Cardiovascular:  Normal rate, normal rhythm, no murmurs, no gallops, no rubs, moderate-severe lymphedema bilateral lower extremities   GI:  Soft, large pannus overhanging to upper legs, normal bowel sounds, mild LLQ tenderness noted, no voluntary guarding  Musculoskeletal:  No cyanosis or obvious acute deformity. Moving all extremities   Integument:  Warm and dry. Severe venous stasis changes bilateral lower extremities. Circumferential  overlying cellulitis noted to bilateral lower extremities that begins above the ankle and extends to just below the knees. Neurologic:  Alert & oriented x 3, no apparent focal deficits noted   Psychiatric:  Speech and behavior appropriate         Lab Results   Component Value Date     10/01/2018    K 4.5 10/01/2018     10/01/2018    CO2 23 10/01/2018    BUN 31 (H) 10/01/2018    CREATININE 2.0 (H) 10/01/2018    GLUCOSE 139 (H) 10/01/2018    CALCIUM 9.2 10/01/2018    PROT 7.3 10/01/2018    LABALBU 3.4 10/01/2018    BILITOT 0.5 10/01/2018    ALKPHOS 184 (H) 10/01/2018    AST 14 10/01/2018    ALT 16 10/01/2018    LABGLOM 25 (L) 10/01/2018    GFRAA 31 (L) 10/01/2018    AGRATIO 0.9 10/01/2018    GLOB 3.9 10/01/2018           Lab Results   Component Value Date    WBC 15.7 (H) 10/01/2018    HGB 6.9 (L) 10/01/2018    HCT 23.5 (L) 10/01/2018    MCV 74.4 (L) 10/01/2018     10/01/2018       XR CHEST PORTABLE   Final Result     Bilateral interstitial prominence is nonspecific and can be seen in the    setting of mild interstitial edema or chronic interstitial lung disease. CT ABDOMEN PELVIS WO CONTRAST   Final Result     Nonspecific skin thickening and subcutaneous edema in the abdominal    wall can be seen in the setting of anasarca and cellulitis. No loculated    fluid collection or soft tissue gas.               Assessment and Plan     Active Hospital Problems    Diagnosis Date Noted    Anemia [D64.9]  Found to be profoundly

## 2018-10-02 NOTE — PROGRESS NOTES
Med rec completed using 6 month med list from [facility]. Discrepancies confirmed with patient. Removed levemir from list (patient currently uses basaglar). Removed omeprazole (patient uses esomeprazole). Removed ranitidine (directions for omeprazole included stopping ranitidine). Dale removed. Per patient, she was told to stop this. There is no history of this being filled in the last 6 months and she says she is not receiving samples. Lidoderm patch left on the home med list despite no recent fills. Per patient, she only uses them prn.

## 2018-10-02 NOTE — ED PROVIDER NOTES
[address] [place] [address] Court  eMERGENCY dEPARTMENT eNCOUnter      Pt Name: General Rutherford  MRN: [id number]  [place]: 1960  Date of evaluation: 74/6/4064  Provider: Ryan Liao MD    [address] [place] [address]       Chief Complaint   Patient presents with    Abdominal Pain     x 2 weeks    Nausea     vomiting from motion sickness in ems truck    Headache    Foot Pain     bilateral foot pain; states they are \"burning\"; states happening for 2 weeks         HISTORY OF PRESENT ILLNESS  (Location/Symptom, Timing/Onset, Context/Setting, Quality, Duration, Modifying Factors, Severity.)   General Rutherford is a 59 y.o. female who presents to the emergency department multiple complaints. Abdominal pain-She complains of intermittent abdominal pains for the last 2 weeks that worsened over the last 2 days. Pain is \"achy\" in nature. Pain is to her upper abdomen and goes into her back. No change in the pain with eating. Pain is worse with any movement. She has nausea but no vomiting. No diarrhea or constipation. She is having some \"heartburn\". She is unable to get up because of weakness and \"knees crunching\". She has been unable to help her sister who she lives with. She also complains of left thigh pain from a surgery she had long ago. She also complains of bilateral foot pain secondary to swelling and \"burning\". This started a couple of days ago and has been constant. Headache-Headache started today. Headache is frontal in location and \"achy\" in nature. Pain has been constant since it started about an hour ago. Nursing notes were reviewed.     REVIEW OF SYSTEMS    (2-9 systems for level 4, 10 or more for level 5)   ROS:  General:  No fevers, no chills, no weakness  Cardiovascular:  No chest pain, no palpitations  Respiratory:  No shortness of breath, no cough, no wheezing  Gastrointestinal:  + pain, + nausea, no vomiting, no diarrhea  Musculoskeletal:  She complains of left thigh pain; bilateral foot pain  Skin:  No rash, no easy bruising  Neurologic:  No speech problems, + headache, no extremity numbness, no extremity tingling, no extremity weakness  Psychiatric:  No anxiety  Genitourinary:  No dysuria, no hematuria    Except as noted above the remainder of the review of systems was reviewed and negative.        PAST MEDICAL HISTORY     Past Medical History:   Diagnosis Date    GERD (gastroesophageal reflux disease) 12/2013    Severe Reflux Esophagitis and hemorrhagic gastritis    Headache(784.0)     Hypertension     Hypothyroidism     Type II or unspecified type diabetes mellitus without mention of complication, not stated as uncontrolled     Vitamin B12 deficiency          SURGICAL HISTORY       Past Surgical History:   Procedure Laterality Date    EYE SURGERY      LEG SURGERY           CURRENT MEDICATIONS       Previous Medications    ALLOPURINOL (ZYLOPRIM) 300 MG TABLET    take 1 tablet by mouth once daily    AMLODIPINE (NORVASC) 5 MG TABLET    take 1 tablet by mouth once daily    BLOOD GLUCOSE TEST STRIPS (FREESTYLE TEST STRIPS) STRIP    Daily dx:250.00    CYANOCOBALAMIN (B-12) 2500 MCG SUBL    DISSOLVE 1 TABLET UNDER THE TONGUE ONCE A DAY    DAPAGLIFLOZIN (FARXIGA) 5 MG TABLET    Take 1 tablet by mouth every morning    DICLOFENAC SODIUM (VOLTAREN) 1 % GEL    Apply 4 g topically 4 times daily    ESOMEPRAZOLE (NEXIUM) 40 MG DELAYED RELEASE CAPSULE    take 1 capsule by mouth once daily    FUROSEMIDE (LASIX) 20 MG TABLET    TAKE 2 TABLETS BY MOUTH ONCE A DAY    GABAPENTIN (NEURONTIN) 600 MG TABLET    take 1 tablet by mouth three times a day    GLIPIZIDE (GLUCOTROL) 10 MG TABLET    take 2 tablets by mouth twice a day with BREAKFAST AND SUPPER    GLUCOSE MONITORING KIT (FREESTYLE) MONITORING KIT    1 kit by Does not apply route daily as needed (elevated glucose) Dx e 11.9    INSULIN DETEMIR (LEVEMIR) 100 UNIT/ML INJECTION VIAL    Inject 25 Units into the skin nightly Sub for Lantus Physical Exam  General :Patient is awake, alert, oriented, Smells of old urine, disheveled; pants were wet with either urine or leakage from BLE's;   HEENT: Pupils are equally round and reactive to light, EOMI. Oral mucosa is dry, no exudate. No pharyngeal erythema. Neck: Neck is supple, full range of motion  Cardiac: Heart regular rate, 3/6 early KYARA, no rubs, or gallops  Lungs: Lungs are clear to auscultation, there is no wheezing, rhonchi, or rales. Chest wall: There is no tenderness to palpation over the chest wall or over ribs  Abdomen: Morbidly obese with very large pannus that overhangs onto her legs; Abdomen is soft, there is LLQ tenderness to palpation, nondistended. There is no firm or pulsatile masses, no rebound rigidity or guarding. : feces caked onto skin of buttocks and in perineum  Stool GUAIAC obtained. Brown stool. Musculoskeletal:  Moves all 4 extremities; ambulatory  Back: No midline or bony tenderness. No CVAT. Neuro: No focal muscle deficits appreciated  Dermatology: Extensive venous stasis changes to BLE's with elephantiasis appearing legs between the knees and ankles; there appears to be an overlying cellulitis to BLE's  Psych: Mentation is grossly normal, cognition is grossly normal. Affect is appropriate.   There appeared to be feces caked on the bottoms of her feet    DIAGNOSTIC RESULTS     EKG: All EKG's are interpreted by the Emergency Department Physician who either signs or Co-signs this chart in the absence of a cardiologist.    NSR  Rate of 95  Old inferior Q waves in III    RADIOLOGY:   Non-plain film images such as CT, Ultrasound and MRI are read by the radiologist. Plain radiographic images are visualized and preliminarily interpreted by the emergency physician with the below findings:      [x] Radiologist's Report Reviewed:  XR CHEST PORTABLE   Final Result     Bilateral interstitial prominence is nonspecific and can be seen in the    setting of mild [facility],  [place], [place] [address]   Phone [phone]   MICROSCOPIC URINALYSIS - Abnormal; Notable for the following:     Mucus, UA 1+ (*)     RBC, UA 3-5 (*)     Amorphous, UA 3+ (*)     All other components within normal limits    Narrative:     Performed at:  [address] [place] [address] Laboratory  [address] [place], [place] [address], [place] [address]   Phone [phone]   CULTURE BLOOD #1   CULTURE BLOOD #2   LIPASE    Narrative:     Performed at:  [address] [place] [address] Laboratory  [address] [place], [place] [address], [place] [address]   Phone [phone]   TROPONIN    Narrative:     Performed at:  [address] [place] [address] Laboratory  [address] [place] [address],  [place], [place] [address] [address]   Phone [phone]   BLOOD OCCULT STOOL SCREEN #1    Narrative:     ORDER#: [id number]                          ORDERED BY: Janette Jose: Stool                              COLLECTED:  10/01/18 23:55  ANTIBIOTICS AT BARBRA.:                      RECEIVED :  10/02/18 00:03  Performed at:  [address] [place] [address] Laboratory  [address] [place], [place] [address], [place] [address] [address]   Phone [phone]   TYPE AND SCREEN    Narrative:     Performed at:  [address] [place] [address] Laboratory  [address] [place], [place] [address], [place] [address] [address]   Phone [phone]   PREPARE RBC (CROSSMATCH)       I have reviewed and interpreted all of the currently available lab results from this visit (if applicable):  Results for orders placed or performed during the hospital encounter of 10/01/18   Brain Natriuretic Peptide   Result Value Ref Range    Pro-BNP 7,352 (H) 0 - 1,800 pg/mL   CBC Auto Differential   Result Value Ref Range    WBC 15.7 (H) 4.0 - 11.0 K/uL    RBC 3.16 (L) 3.80 - 5.80 M/uL    Hemoglobin 6.9 (L) 11.5 - 16.5 g/dL    Hematocrit 23.5 (L) 37.0 - 47.0 %    MCV 74.4 (L) 80.0 - 100.0 fL    MCH 21.8 (L) 27.0 - 32.0 pg    MCHC 29.4 (L) 31.0 - 35.0 g/dL    RDW 20.7 (H) 11.0 - 16.0 %    Platelets 025 751 - 397 K/uL    MPV 10.2 (H) 6.0 - 10.0 fL    Neutrophils % 93.5 %    Immature Granulocytes % 0.6 0.0 - 5.0 %    Lymphocytes % 2.8 %    Monocytes % 2.7 %    Eosinophils % 0.2 %    Basophils % 0.2 %    Neutrophils # 14.7 (H) 2.0 - 7.5 K/uL    Immature Granulocytes # 0.1 K/uL    Lymphocytes # 0.4 (L) 1.5 - 4.0 K/uL    Monocytes # 0.4 0.2 - 0.8 K/uL    Eosinophils # 0.0 0.0 - 0.4 K/uL    Basophils # 0.0 0.0 - 0.1 K/uL   Comprehensive Metabolic Panel   Result Value Ref Range    Sodium 136 136 - 145 mmol/L    Potassium 4.5 3.4 - 5.1 mmol/L    Chloride 101 98 - 107 mmol/L    CO2 23 20 - 30 mmol/L    Anion Gap 12 3 - 16    Glucose 139 (H) 74 - 106 mg/dL    BUN 31 (H) 6 - 20 mg/dL    CREATININE 2.0 (H) 0.4 - 1.2 mg/dL    GFR Non-African American 25 (L) >59    GFR  31 (L) >59    Calcium 9.2 8.5 - 10.5 mg/dL    Total Protein 7.3 6.4 - 8.3 g/dL    Alb 3.4 3.4 - 4.8 g/dL    Albumin/Globulin Ratio 0.9 0.8 - 2.0    Total Bilirubin 0.5 0.3 - 1.2 mg/dL    Alkaline Phosphatase 184 (H) 25 - 100 U/L    ALT 16 4 - 36 U/L    AST 14 8 - 33 U/L    Globulin 3.9 g/dL   Lipase   Result Value Ref Range    Lipase 12.0 5.6 - 51.3 U/L   Troponin   Result Value Ref Range    Troponin <0.30 <0.30 ng/mL   Urinalysis Reflex to Culture   Result Value Ref Range    Color, UA Yellow Straw/Yellow    Clarity, UA Clear Clear    Glucose, Ur Negative Negative mg/dL    Bilirubin Urine Negative Negative    Ketones, Urine TRACE (A) Negative mg/dL    Specific Gravity, UA 1.020 1.005 - 1.030    Blood, Urine SMALL (A) Negative    pH, UA 5.5 5.0 - 8.0    Protein, UA >=300 (A) Negative mg/dL    Urobilinogen, Urine 0.2 <2.0 E.U./dL    Nitrite, Urine Negative Negative    Leukocyte Esterase, Urine Negative Negative    Microscopic Examination YES     Urine Reflex to Culture Not Indicated     Urine Type Not Specified    Blood Occult Stool Screen #1   Result Value Ref Range    Occult Blood Screening Result: Negative  Normal range:

## 2018-10-02 NOTE — CONSULTS
Nutrition Assessment    Type and Reason for Visit: Initial, Consult (wt loss and wounds; edu)    Nutrition Recommendations: Will start patient on Kam BID,  Offered patient education on My Plate Method conducive to healthy weight loss and help with diabetes management. Patient asked RD to come back at another time. Malnutrition Assessment:  · Malnutrition Status: No malnutrition     Nutrition Diagnosis: (PES 1)  · Problem: may need more protein   · Etiology: related to Increased demand for energy/nutrients due to     Signs and symptoms:  as evidenced by Localized or generalized fluid accumulation, Lab values (poor skin integrity)    (PES-2)   Problem- overweight/obesity   Etiology: related to nutrition knowledge deficit, over abundance of kcal   Signs and symptoms: increased BMI, skin integrity, patient states that she does not follow any carb controlled diet. Nutrition Assessment:  · Subjective Assessment:    · Nutrition-Focused Physical Findings: Patient says she has wounds on foot, place on back of leg. Verified as crunching on back of legs, nothing notes about wounds on feet. Has 2+ swelling. Eating good at this time.   Patient is anemic and receiving blood at time of review  · Wound Type:    · Current Nutrition Therapies:  · Oral Diet Orders: Carb Control 4 Carbs/Meal   · Oral Diet intake: %  · Oral Nutrition Supplement (ONS) Orders: None  · ONS intake:    · Anthropometric Measures:  · Ht: 5' 5\" (165.1 cm)   · Current Body Wt: 349 lb 3.3 oz (158.4 kg)  · Admission Body Wt:    · Usual Body Wt:    · % Weight Change:  (weight gain),     · Ideal Body Wt: 125 lb (56.7 kg), % Ideal Body 279  · Adjusted Body Wt:  , body weight adjusted for    · BMI Classification: BMI > or equal to 40.0 Obese Class III (56.7)  · Comparative Standards (Estimated Nutrition Needs):  · Estimated Daily Total Kcal: 9114-9459 (1.2 activity factor-250-500 kcal for safe wt loss)  · Estimated Daily Protein (g):  45-71 gm

## 2018-10-03 ENCOUNTER — OUTSIDE FACILITY SERVICE (OUTPATIENT)
Dept: CARDIOLOGY | Facility: CLINIC | Age: 58
End: 2018-10-03

## 2018-10-03 LAB
ANION GAP SERPL CALCULATED.3IONS-SCNC: 10 MMOL/L (ref 3–16)
BUN BLDV-MCNC: 35 MG/DL (ref 6–20)
CALCIUM SERPL-MCNC: 9.1 MG/DL (ref 8.5–10.5)
CHLORIDE BLD-SCNC: 104 MMOL/L (ref 98–107)
CO2: 24 MMOL/L (ref 20–30)
CREAT SERPL-MCNC: 2.1 MG/DL (ref 0.4–1.2)
GFR AFRICAN AMERICAN: 29
GFR NON-AFRICAN AMERICAN: 24
GLUCOSE BLD-MCNC: 109 MG/DL (ref 74–106)
GLUCOSE BLD-MCNC: 164 MG/DL (ref 74–106)
GLUCOSE BLD-MCNC: 211 MG/DL (ref 74–106)
GLUCOSE BLD-MCNC: 81 MG/DL (ref 74–106)
GLUCOSE BLD-MCNC: 81 MG/DL (ref 74–106)
HCT VFR BLD CALC: 26.4 % (ref 37–47)
HEMOGLOBIN: 7.8 G/DL (ref 11.5–16.5)
MCH RBC QN AUTO: 22.7 PG (ref 27–32)
MCHC RBC AUTO-ENTMCNC: 29.5 G/DL (ref 31–35)
MCV RBC AUTO: 77 FL (ref 80–100)
PDW BLD-RTO: 20.9 % (ref 11–16)
PERFORMED ON: ABNORMAL
PERFORMED ON: NORMAL
PLATELET # BLD: 180 K/UL (ref 150–400)
PMV BLD AUTO: 10.7 FL (ref 6–10)
POTASSIUM SERPL-SCNC: 4.2 MMOL/L (ref 3.4–5.1)
RBC # BLD: 3.43 M/UL (ref 3.8–5.8)
SODIUM BLD-SCNC: 138 MMOL/L (ref 136–145)
VANCOMYCIN RANDOM: 11.5 UG/ML (ref 5–40)
WBC # BLD: 9.6 K/UL (ref 4–11)

## 2018-10-03 PROCEDURE — 97110 THERAPEUTIC EXERCISES: CPT

## 2018-10-03 PROCEDURE — 6370000000 HC RX 637 (ALT 250 FOR IP): Performed by: NURSE PRACTITIONER

## 2018-10-03 PROCEDURE — 93306 TTE W/DOPPLER COMPLETE: CPT | Performed by: INTERNAL MEDICINE

## 2018-10-03 PROCEDURE — 2580000003 HC RX 258: Performed by: INTERNAL MEDICINE

## 2018-10-03 PROCEDURE — 80048 BASIC METABOLIC PNL TOTAL CA: CPT

## 2018-10-03 PROCEDURE — 36415 COLL VENOUS BLD VENIPUNCTURE: CPT

## 2018-10-03 PROCEDURE — 6360000002 HC RX W HCPCS: Performed by: INTERNAL MEDICINE

## 2018-10-03 PROCEDURE — 80202 ASSAY OF VANCOMYCIN: CPT

## 2018-10-03 PROCEDURE — 1200000000 HC SEMI PRIVATE

## 2018-10-03 PROCEDURE — 2580000003 HC RX 258: Performed by: NURSE PRACTITIONER

## 2018-10-03 PROCEDURE — 99232 SBSQ HOSP IP/OBS MODERATE 35: CPT | Performed by: INTERNAL MEDICINE

## 2018-10-03 PROCEDURE — 99222 1ST HOSP IP/OBS MODERATE 55: CPT | Performed by: SURGERY

## 2018-10-03 PROCEDURE — 97803 MED NUTRITION INDIV SUBSEQ: CPT

## 2018-10-03 PROCEDURE — 85027 COMPLETE CBC AUTOMATED: CPT

## 2018-10-03 RX ORDER — VANCOMYCIN HYDROCHLORIDE 1 G/200ML
1000 INJECTION, SOLUTION INTRAVENOUS EVERY 24 HOURS
Status: DISCONTINUED | OUTPATIENT
Start: 2018-10-03 | End: 2018-10-07 | Stop reason: HOSPADM

## 2018-10-03 RX ORDER — 0.9 % SODIUM CHLORIDE 0.9 %
250 INTRAVENOUS SOLUTION INTRAVENOUS ONCE
Status: COMPLETED | OUTPATIENT
Start: 2018-10-03 | End: 2018-10-04

## 2018-10-03 RX ADMIN — NYSTATIN: 100000 CREAM TOPICAL at 20:50

## 2018-10-03 RX ADMIN — IRON SUCROSE 200 MG: 20 INJECTION, SOLUTION INTRAVENOUS at 10:59

## 2018-10-03 RX ADMIN — INSULIN GLARGINE 35 UNITS: 100 INJECTION, SOLUTION SUBCUTANEOUS at 21:34

## 2018-10-03 RX ADMIN — ZINC SULFATE 220 MG (50 MG) CAPSULE 220 MG: CAPSULE at 08:43

## 2018-10-03 RX ADMIN — MICONAZOLE NITRATE: 20 CREAM TOPICAL at 08:44

## 2018-10-03 RX ADMIN — SIMVASTATIN 20 MG: 20 TABLET, FILM COATED ORAL at 20:50

## 2018-10-03 RX ADMIN — GABAPENTIN 300 MG: 300 CAPSULE ORAL at 08:42

## 2018-10-03 RX ADMIN — GLIPIZIDE 5 MG: 5 TABLET ORAL at 16:48

## 2018-10-03 RX ADMIN — FAMOTIDINE 20 MG: 20 TABLET ORAL at 20:48

## 2018-10-03 RX ADMIN — MICONAZOLE NITRATE: 20 CREAM TOPICAL at 20:50

## 2018-10-03 RX ADMIN — INSULIN LISPRO 1 UNITS: 100 INJECTION, SOLUTION INTRAVENOUS; SUBCUTANEOUS at 16:49

## 2018-10-03 RX ADMIN — GABAPENTIN 300 MG: 300 CAPSULE ORAL at 20:48

## 2018-10-03 RX ADMIN — LINAGLIPTIN 5 MG: 5 TABLET, FILM COATED ORAL at 08:43

## 2018-10-03 RX ADMIN — GLIPIZIDE 5 MG: 5 TABLET ORAL at 06:14

## 2018-10-03 RX ADMIN — LEVOTHYROXINE SODIUM 50 MCG: 50 TABLET ORAL at 06:14

## 2018-10-03 RX ADMIN — GABAPENTIN 300 MG: 300 CAPSULE ORAL at 13:19

## 2018-10-03 RX ADMIN — VITAMIN D, TAB 1000IU (100/BT) 2000 UNITS: 25 TAB at 08:42

## 2018-10-03 RX ADMIN — ALLOPURINOL 300 MG: 100 TABLET ORAL at 08:43

## 2018-10-03 RX ADMIN — NYSTATIN: 100000 CREAM TOPICAL at 08:44

## 2018-10-03 RX ADMIN — FAMOTIDINE 20 MG: 20 TABLET ORAL at 08:43

## 2018-10-03 RX ADMIN — Medication 10 ML: at 08:44

## 2018-10-03 RX ADMIN — OXYCODONE HYDROCHLORIDE AND ACETAMINOPHEN 500 MG: 500 TABLET ORAL at 08:43

## 2018-10-03 RX ADMIN — Medication 10 ML: at 20:51

## 2018-10-03 RX ADMIN — ASPIRIN 81 MG: 81 TABLET, COATED ORAL at 08:43

## 2018-10-03 RX ADMIN — INSULIN LISPRO 1 UNITS: 100 INJECTION, SOLUTION INTRAVENOUS; SUBCUTANEOUS at 21:33

## 2018-10-03 RX ADMIN — AMLODIPINE BESYLATE 5 MG: 5 TABLET ORAL at 08:43

## 2018-10-03 NOTE — PROGRESS NOTES
Dynamic: Good  Standing - Static: Fair;+  Standing - Dynamic: Fair;+     Exercises  Straight Leg Raise: x10 B  Quad Sets: x20 B  Heelslides: x10 B  Gluteal Sets: x20  Hip Abduction: x10 B  Ankle Pumps: x20 B  Other exercises  Other exercises?: Yes  Other exercises 1: adductor pillow squeezes x 10                        Assessment   Body structures, Functions, Activity limitations: Decreased functional mobility ; Decreased strength;Decreased endurance;Decreased balance  Assessment: Patient presents with continued weakness, but is MI with bed mobility today. Patient able to come to stand 3x and side step along head of bed. Patient performed therex with little difficulty, but says her legs are too sore to walk. Treatment Diagnosis: Weakness  Prognosis: Good  REQUIRES PT FOLLOW UP: Yes  Activity Tolerance  Activity Tolerance: Patient limited by pain  Activity Tolerance: Patient reports soreness in her legs and says she cannot walk      Goals  Short term goals  Time Frame for Short term goals: 3-5 days  Short term goal 1: Patient will be MI with bed mobility. MET  Short term goal 2: Improve strength and endurance to tolerate 30 minutes of therex/ther. activity. Short term goal 3: Patient will perform basic transfers with SBA. Short term goal 4: Increase strength to ambulate 150 feet with RW and SBA, with safety present.      Plan    Plan  Times per week: 4-5x/week  Times per day: Daily  Plan weeks: 1  Current Treatment Recommendations: Strengthening, ROM, Balance Training, Functional Mobility Training, Transfer Training, Endurance Training, Gait Training, Neuromuscular Re-education, Safety Education & Training, Patient/Caregiver Education & Training  Safety Devices  Type of devices: Left in bed, Call light within reach     Therapy Time   Individual Concurrent Group Co-treatment   Time In 1052         Time Out 1107         Minutes 55 [facility],      This note serves as D/C summary if patient is discharged prior to next visit.

## 2018-10-03 NOTE — CONSULTS
[address] [facility]  MD Roque Sheets [address] [place], [place]. [address]      Reason for Consult:  anemia  Requesting Physician: JOSE Tomas   Consult requested from JOSE Tomas to evaluate and treat anemia      History Obtained From:  patient, electronic medical record    HISTORY OF PRESENT ILLNESS:                The patient is a 59 y.o. female who presents with anemia that is severe(hgb-6.9) on admission for leg swelling. Long h/o bilat venous stasis disease and DM. Patient is poor historian but thinks she has had both EGD and colonoscopy many years ago but is unsure exactly when or where. Denies melena, hematochezia or hematemesis. CT of abdomen without contrast was unremarkable. Iron is also quite low and is getting IV venofir. PRBC transfusions pending due to antibodies in blood; scheduled to get first transfusion in am.    Past Medical History:        Diagnosis Date    GERD (gastroesophageal reflux disease) 12/2013    Severe Reflux Esophagitis and hemorrhagic gastritis    Headache(784.0)     Hypertension     Hypothyroidism     Type II or unspecified type diabetes mellitus without mention of complication, not stated as uncontrolled     Vitamin B12 deficiency      Past Surgical History:        Procedure Laterality Date    EYE SURGERY      LEG SURGERY       Current Medications:   Prior to Admission medications    Medication Sig Start Date End Date Taking? Authorizing Provider   furosemide (LASIX) 20 MG tablet TAKE 2 TABLETS BY MOUTH ONCE A DAY 9/7/18  Yes JOSE Tomas   RA VITAMIN D-3 2000 units CAPS take 1 capsule by mouth once daily 9/4/18  Yes JOSE Tomas   RA ASPIRIN EC 81 MG EC tablet take 1 tablet by mouth once daily 9/4/18  Yes JOSE Tomas   nystatin (MYCOSTATIN) 209291 UNIT/GM cream Apply topically 2 times daily.  8/24/18  Yes JOSE Tomas   blood glucose test strips (FREESTYLE TEST STRIPS) strip Daily dx:250.00 7/26/18  Yes Asuncion Cordero JOSE   Cyanocobalamin (B-12) 2500 MCG SUBL DISSOLVE 1 TABLET UNDER THE TONGUE ONCE A DAY 7/19/18  Yes JOSE Palm   allopurinol (ZYLOPRIM) 300 MG tablet take 1 tablet by mouth once daily 6/26/18  Yes JOSE Palm   glipiZIDE (GLUCOTROL) 10 MG tablet take 2 tablets by mouth twice a day with BREAKFAST AND SUPPER 6/26/18  Yes JOSE Palm   glucose monitoring kit (FREESTYLE) monitoring kit 1 kit by Does not apply route daily as needed (elevated glucose) Dx e 11.9 6/15/18  Yes JOSE Plam   insulin glargine [facility]) 100 UNIT/ML injection pen Inject 30 Units into the skin nightly 6/14/18  Yes JOSE Palm   zinc sulfate (ORAZINC) 220 (50 Zn) MG capsule Take 1 capsule by mouth daily 6/14/18 6/14/19 Yes JOSE Palm   vitamin C (ASCORBIC ACID) 500 MG tablet Take 1 tablet by mouth daily 6/14/18  Yes JOSE Palm   gabapentin (NEURONTIN) 600 MG tablet take 1 tablet by mouth three times a day 5/22/18 10/1/18 Yes JOSE Palm   simvastatin (ZOCOR) 20 MG tablet take 1 tablet by mouth at bedtime 5/19/18  Yes JOSE Palm   esomeprazole (NEXIUM) 40 MG delayed release capsule take 1 capsule by mouth once daily 5/19/18  Yes JOSE Palm   TRADJENTA 5 MG tablet take 1 tablet by mouth daily 4/19/18  Yes JOSE Palm   amLODIPine (NORVASC) 5 MG tablet take 1 tablet by mouth once daily 4/19/18  Yes JOSE Palm   Insulin Pen Needle 32G X 4 MM MISC 1 each by Does not apply route daily Dx: DM2 3/6/18  Yes JOSE Palm   promethazine (PHENERGAN) 25 MG tablet Take 1 tablet by mouth every 6 hours as needed for Nausea 9/8/15  Yes JOSE Palm   lidocaine (LIDODERM) 5 % Place 1 patch onto the skin daily 12 hours on, 12 hours off. 5/11/15  Yes JOSE Palm   loperamide (IMODIUM) 2 MG capsule take 1 capsule by mouth four times a day if needed for diarrhea 6/14/18   JOSE Palm     Allergies:  Metformin and

## 2018-10-03 NOTE — PROGRESS NOTES
Occupational Therapy  Facility/Department: [address] [place] [address] MED SURG  Daily Treatment Note  NAME: Avery De La Cruz  : 1960  MRN: [id number]    Date of Service: 10/3/2018    Discharge Recommendations:  Continue to assess pending progress       Patient Diagnosis(es): The primary encounter diagnosis was Anemia requiring transfusions. Diagnoses of Leukocytosis, unspecified type, Cellulitis of lower extremity, unspecified laterality, Elevated brain natriuretic peptide (BNP) level, Abdominal pain, left lower quadrant, and Chronic renal impairment, unspecified CKD stage were also pertinent to this visit. has a past medical history of GERD (gastroesophageal reflux disease); Headache(784.0); Hypertension; Hypothyroidism; Type II or unspecified type diabetes mellitus without mention of complication, not stated as uncontrolled; and Vitamin B12 deficiency. has a past surgical history that includes Eye surgery and Leg Surgery. Restrictions  Restrictions/Precautions  Restrictions/Precautions: Fall Risk, General Precautions  Subjective   General  Chart Reviewed: Yes  Patient assessed for rehabilitation services?: Yes  Family / Caregiver Present: No  Referring Practitioner: JOSE Almendarez  Diagnosis: anemia  Subjective  Subjective: Pt states she came to hospital because she was feeling weak. Pt agreeable to OT services.        Orientation     Objective             Standing Balance  Sit to stand: Contact guard assistance  Bed mobility  Supine to Sit: Modified independent  Sit to Supine: Modified independent  Scooting: Modified independent  Transfers  Stand Step Transfers: Contact guard assistance  Sit to stand: Contact guard assistance                                            Type of ROM/Therapeutic Exercise  Type of ROM/Therapeutic Exercise: Free weights  Comment: 2#  Exercises  Shoulder Flexion: x15  Horizontal ADduction: x15  Elbow Flexion: x15  Elbow Extension: x15  Supination: x15  Wrist Extension: [employer]

## 2018-10-03 NOTE — PLAN OF CARE
Problem: Safety:  Goal: Free from accidental physical injury  Free from accidental physical injury  Outcome: Ongoing      Problem: Daily Care:  Goal: Daily care needs are met  Daily care needs are met  Outcome: Ongoing

## 2018-10-04 LAB
ANION GAP SERPL CALCULATED.3IONS-SCNC: 10 MMOL/L (ref 3–16)
BLOOD BANK DISPENSE STATUS: NORMAL
BLOOD BANK DISPENSE STATUS: NORMAL
BLOOD BANK PRODUCT CODE: NORMAL
BLOOD BANK PRODUCT CODE: NORMAL
BPU ID: NORMAL
BPU ID: NORMAL
BUN BLDV-MCNC: 33 MG/DL (ref 6–20)
CALCIUM SERPL-MCNC: 8.8 MG/DL (ref 8.5–10.5)
CHLORIDE BLD-SCNC: 105 MMOL/L (ref 98–107)
CO2: 25 MMOL/L (ref 20–30)
COMPONENT: NORMAL
COMPONENT: NORMAL
CREAT SERPL-MCNC: 1.9 MG/DL (ref 0.4–1.2)
DONOR TYPE/RH: NORMAL
DONOR TYPE/RH: NORMAL
GFR AFRICAN AMERICAN: 33
GFR NON-AFRICAN AMERICAN: 27
GLUCOSE BLD-MCNC: 177 MG/DL (ref 74–106)
GLUCOSE BLD-MCNC: 75 MG/DL (ref 74–106)
GLUCOSE BLD-MCNC: 79 MG/DL (ref 74–106)
GLUCOSE BLD-MCNC: 79 MG/DL (ref 74–106)
GLUCOSE BLD-MCNC: 82 MG/DL (ref 74–106)
HCT VFR BLD CALC: 26.1 % (ref 37–47)
HEMOGLOBIN: 7.7 G/DL (ref 11.5–16.5)
HOLLISTER NO: NORMAL
HOLLISTER NO: NORMAL
MCH RBC QN AUTO: 22.9 PG (ref 27–32)
MCHC RBC AUTO-ENTMCNC: 29.5 G/DL (ref 31–35)
MCV RBC AUTO: 77.7 FL (ref 80–100)
PDW BLD-RTO: 21.2 % (ref 11–16)
PERFORMED ON: ABNORMAL
PERFORMED ON: NORMAL
PLATELET # BLD: 189 K/UL (ref 150–400)
PMV BLD AUTO: 10.1 FL (ref 6–10)
POTASSIUM SERPL-SCNC: 4.4 MMOL/L (ref 3.4–5.1)
RBC # BLD: 3.36 M/UL (ref 3.8–5.8)
SODIUM BLD-SCNC: 140 MMOL/L (ref 136–145)
WBC # BLD: 8.8 K/UL (ref 4–11)

## 2018-10-04 PROCEDURE — 2580000003 HC RX 258: Performed by: NURSE PRACTITIONER

## 2018-10-04 PROCEDURE — 6370000000 HC RX 637 (ALT 250 FOR IP): Performed by: PEDIATRICS

## 2018-10-04 PROCEDURE — 36430 TRANSFUSION BLD/BLD COMPNT: CPT

## 2018-10-04 PROCEDURE — 85027 COMPLETE CBC AUTOMATED: CPT

## 2018-10-04 PROCEDURE — 1200000000 HC SEMI PRIVATE

## 2018-10-04 PROCEDURE — 99231 SBSQ HOSP IP/OBS SF/LOW 25: CPT | Performed by: NURSE PRACTITIONER

## 2018-10-04 PROCEDURE — 6360000002 HC RX W HCPCS: Performed by: NURSE PRACTITIONER

## 2018-10-04 PROCEDURE — 97110 THERAPEUTIC EXERCISES: CPT

## 2018-10-04 PROCEDURE — 6370000000 HC RX 637 (ALT 250 FOR IP): Performed by: NURSE PRACTITIONER

## 2018-10-04 PROCEDURE — 80048 BASIC METABOLIC PNL TOTAL CA: CPT

## 2018-10-04 PROCEDURE — 94760 N-INVAS EAR/PLS OXIMETRY 1: CPT

## 2018-10-04 PROCEDURE — 36415 COLL VENOUS BLD VENIPUNCTURE: CPT

## 2018-10-04 PROCEDURE — 94640 AIRWAY INHALATION TREATMENT: CPT

## 2018-10-04 PROCEDURE — 6360000002 HC RX W HCPCS: Performed by: INTERNAL MEDICINE

## 2018-10-04 PROCEDURE — P9016 RBC LEUKOCYTES REDUCED: HCPCS

## 2018-10-04 RX ORDER — 0.9 % SODIUM CHLORIDE 0.9 %
250 INTRAVENOUS SOLUTION INTRAVENOUS ONCE
Status: COMPLETED | OUTPATIENT
Start: 2018-10-04 | End: 2018-10-05

## 2018-10-04 RX ORDER — IPRATROPIUM BROMIDE AND ALBUTEROL SULFATE 2.5; .5 MG/3ML; MG/3ML
1 SOLUTION RESPIRATORY (INHALATION)
Status: DISCONTINUED | OUTPATIENT
Start: 2018-10-05 | End: 2018-10-06

## 2018-10-04 RX ORDER — DIPHENHYDRAMINE HCL 25 MG
CAPSULE ORAL
Status: DISPENSED
Start: 2018-10-04 | End: 2018-10-04

## 2018-10-04 RX ORDER — ACETAMINOPHEN 325 MG/1
TABLET ORAL
Status: DISPENSED
Start: 2018-10-04 | End: 2018-10-04

## 2018-10-04 RX ORDER — POLYETHYLENE GLYCOL 3350 17 G/17G
238 POWDER ORAL ONCE
Status: COMPLETED | OUTPATIENT
Start: 2018-10-04 | End: 2018-10-04

## 2018-10-04 RX ORDER — ACETAMINOPHEN 325 MG/1
650 TABLET ORAL ONCE
Status: COMPLETED | OUTPATIENT
Start: 2018-10-04 | End: 2018-10-04

## 2018-10-04 RX ORDER — DIPHENHYDRAMINE HCL 25 MG
25 CAPSULE ORAL ONCE
Status: COMPLETED | OUTPATIENT
Start: 2018-10-04 | End: 2018-10-04

## 2018-10-04 RX ADMIN — INSULIN LISPRO 1 UNITS: 100 INJECTION, SOLUTION INTRAVENOUS; SUBCUTANEOUS at 16:31

## 2018-10-04 RX ADMIN — VANCOMYCIN HYDROCHLORIDE 1000 MG: 1 INJECTION, SOLUTION INTRAVENOUS at 22:48

## 2018-10-04 RX ADMIN — OXYCODONE HYDROCHLORIDE AND ACETAMINOPHEN 500 MG: 500 TABLET ORAL at 08:23

## 2018-10-04 RX ADMIN — ACETAMINOPHEN 650 MG: 325 TABLET, FILM COATED ORAL at 12:04

## 2018-10-04 RX ADMIN — LEVOTHYROXINE SODIUM 50 MCG: 50 TABLET ORAL at 06:37

## 2018-10-04 RX ADMIN — POLYETHYLENE GLYCOL 3350 238 G: 17 POWDER, FOR SOLUTION ORAL at 14:25

## 2018-10-04 RX ADMIN — GABAPENTIN 300 MG: 300 CAPSULE ORAL at 08:23

## 2018-10-04 RX ADMIN — DIPHENHYDRAMINE HYDROCHLORIDE 25 MG: 25 CAPSULE ORAL at 12:05

## 2018-10-04 RX ADMIN — GABAPENTIN 300 MG: 300 CAPSULE ORAL at 14:08

## 2018-10-04 RX ADMIN — VITAMIN D, TAB 1000IU (100/BT) 2000 UNITS: 25 TAB at 08:23

## 2018-10-04 RX ADMIN — SODIUM CHLORIDE 250 ML: 9 INJECTION, SOLUTION INTRAVENOUS at 15:24

## 2018-10-04 RX ADMIN — MICONAZOLE NITRATE: 20 CREAM TOPICAL at 08:25

## 2018-10-04 RX ADMIN — FAMOTIDINE 20 MG: 20 TABLET ORAL at 08:23

## 2018-10-04 RX ADMIN — BISACODYL 10 MG: 5 TABLET, COATED ORAL at 16:27

## 2018-10-04 RX ADMIN — ENOXAPARIN SODIUM 40 MG: 100 INJECTION SUBCUTANEOUS at 08:24

## 2018-10-04 RX ADMIN — ZINC SULFATE 220 MG (50 MG) CAPSULE 220 MG: CAPSULE at 08:23

## 2018-10-04 RX ADMIN — NYSTATIN: 100000 CREAM TOPICAL at 08:25

## 2018-10-04 RX ADMIN — SIMVASTATIN 20 MG: 20 TABLET, FILM COATED ORAL at 20:37

## 2018-10-04 RX ADMIN — FAMOTIDINE 20 MG: 20 TABLET ORAL at 20:37

## 2018-10-04 RX ADMIN — SODIUM CHLORIDE 250 ML: 9 INJECTION, SOLUTION INTRAVENOUS at 12:04

## 2018-10-04 RX ADMIN — ASPIRIN 81 MG: 81 TABLET, COATED ORAL at 08:23

## 2018-10-04 RX ADMIN — FUROSEMIDE 40 MG: 40 TABLET ORAL at 08:23

## 2018-10-04 RX ADMIN — LEVOFLOXACIN 750 MG: 5 INJECTION, SOLUTION INTRAVENOUS at 02:00

## 2018-10-04 RX ADMIN — ALLOPURINOL 300 MG: 100 TABLET ORAL at 08:24

## 2018-10-04 RX ADMIN — NYSTATIN: 100000 CREAM TOPICAL at 22:53

## 2018-10-04 RX ADMIN — AMLODIPINE BESYLATE 5 MG: 5 TABLET ORAL at 08:23

## 2018-10-04 RX ADMIN — FUROSEMIDE 40 MG: 40 TABLET ORAL at 20:37

## 2018-10-04 RX ADMIN — VANCOMYCIN HYDROCHLORIDE 1000 MG: 1 INJECTION, SOLUTION INTRAVENOUS at 00:21

## 2018-10-04 RX ADMIN — MICONAZOLE NITRATE: 20 CREAM TOPICAL at 22:52

## 2018-10-04 RX ADMIN — GABAPENTIN 300 MG: 300 CAPSULE ORAL at 20:37

## 2018-10-04 RX ADMIN — Medication 10 ML: at 08:24

## 2018-10-04 RX ADMIN — IPRATROPIUM BROMIDE AND ALBUTEROL SULFATE 1 AMPULE: .5; 3 SOLUTION RESPIRATORY (INHALATION) at 22:04

## 2018-10-04 RX ADMIN — BISACODYL 10 MG: 5 TABLET, COATED ORAL at 14:08

## 2018-10-04 NOTE — FLOWSHEET NOTE
2nd unit of blood started at this time. Pt pre-medicated per protocol. Pt alert and oriented RN at bedside.
Pt resting quietly in bed, no complaints at this time. No s/s of blood transfusion reaction. Call bell within reach.
Genitourinary   Genitourinary (WDL) WDL   Flank Tenderness No   Suprapubic Tenderness No   Dysuria No   Anus/Rectum   Anus/Rectum (WDL) WDL   Urethral Catheter 16 fr   Placement Date/Time: 10/02/18 0010   Inserted by: Jamie Elias RN  Tube Size (fr): 16 fr  Catheter Balloon Size: 10 mL  Collection Container: Standard  Securement Method: Securing device (Describe)  Urine Returned: Yes   Catheter Indications Acute urinary retention/obstruction   Urine Color Yellow   Urine Appearance Clear   Psychosocial   Psychosocial (WDL) WDL     Patient on phone with family member att   No acute distress noted   Lung sounds clear diminished   BLE noted with

## 2018-10-04 NOTE — PROGRESS NOTES
Pt given pre medications, see eMAR. Pt vitals completed. Blood started at 1217. Virals completed after infusion running for 15 minutes. Pt continues laying in bed, Alert, no acute distress noted. Pt denies any pain, SOA, CP, HA, itching, or acute back pain. No signs of reaction noted at this time. Pt tolerating well. Pt denies any complaints or needs. Will continue to monitor.

## 2018-10-04 NOTE — PROGRESS NOTES
Occupational Therapy  Facility/Department: [address] [place] [address] MED SURG  Daily Treatment Note  NAME: Jim Tian  : 1960  MRN: [id number]    Date of Service: 10/4/2018    Discharge Recommendations:  Continue to assess pending progress       Patient Diagnosis(es): The primary encounter diagnosis was Anemia requiring transfusions. Diagnoses of Leukocytosis, unspecified type, Cellulitis of lower extremity, unspecified laterality, Elevated brain natriuretic peptide (BNP) level, Abdominal pain, left lower quadrant, and Chronic renal impairment, unspecified CKD stage were also pertinent to this visit. has a past medical history of GERD (gastroesophageal reflux disease); Headache(784.0); Hypertension; Hypothyroidism; Type II or unspecified type diabetes mellitus without mention of complication, not stated as uncontrolled; and Vitamin B12 deficiency. has a past surgical history that includes Eye surgery and Leg Surgery. Restrictions  Restrictions/Precautions  Restrictions/Precautions: Fall Risk, General Precautions  Subjective   General  Chart Reviewed: Yes  Patient assessed for rehabilitation services?: Yes  Family / Caregiver Present: No  Referring Practitioner: JOSE Covarrubias  Diagnosis: anemia  Subjective  Subjective: I have been getting blood today and I gotta start that stuff for my bowels soon. Orientation     Objective        Type of ROM/Therapeutic Exercise  Type of ROM/Therapeutic Exercise: Free weights  Comment: 2#  Exercises  Shoulder Flexion: x15  Horizontal ADduction: x15  Elbow Flexion: x15  Elbow Extension: x15  Supination: x15  Wrist Extension: x15    Assessment   Assessment: Pt agreeable to OT services. Pt receiving blood and left supine in bed. Pt completed BUE ther ex in supine position with 2# weight without difficulty. Pt left in bed with call light in reach and all needs met.               Plan   Plan  Times per week: 3-5   Times per day: Daily  Plan weeks: 3-5 days  Current

## 2018-10-05 LAB
ANION GAP SERPL CALCULATED.3IONS-SCNC: 10 MMOL/L (ref 3–16)
BUN BLDV-MCNC: 28 MG/DL (ref 6–20)
CALCIUM SERPL-MCNC: 9.1 MG/DL (ref 8.5–10.5)
CHLORIDE BLD-SCNC: 106 MMOL/L (ref 98–107)
CO2: 26 MMOL/L (ref 20–30)
CREAT SERPL-MCNC: 1.7 MG/DL (ref 0.4–1.2)
GFR AFRICAN AMERICAN: 37
GFR NON-AFRICAN AMERICAN: 31
GLUCOSE BLD-MCNC: 104 MG/DL (ref 74–106)
GLUCOSE BLD-MCNC: 194 MG/DL (ref 74–106)
GLUCOSE BLD-MCNC: 204 MG/DL (ref 74–106)
GLUCOSE BLD-MCNC: 86 MG/DL (ref 74–106)
GLUCOSE BLD-MCNC: 89 MG/DL (ref 74–106)
HCT VFR BLD CALC: 32.1 % (ref 37–47)
HEMOGLOBIN: 9.7 G/DL (ref 11.5–16.5)
MCH RBC QN AUTO: 24 PG (ref 27–32)
MCHC RBC AUTO-ENTMCNC: 30.2 G/DL (ref 31–35)
MCV RBC AUTO: 79.3 FL (ref 80–100)
PDW BLD-RTO: 20.6 % (ref 11–16)
PERFORMED ON: ABNORMAL
PERFORMED ON: ABNORMAL
PERFORMED ON: NORMAL
PERFORMED ON: NORMAL
PLATELET # BLD: 194 K/UL (ref 150–400)
PMV BLD AUTO: 10 FL (ref 6–10)
POTASSIUM SERPL-SCNC: 4.5 MMOL/L (ref 3.4–5.1)
RBC # BLD: 4.05 M/UL (ref 3.8–5.8)
SODIUM BLD-SCNC: 142 MMOL/L (ref 136–145)
VANCOMYCIN TROUGH: 16.1 UG/ML (ref 5–15)
WBC # BLD: 8.2 K/UL (ref 4–11)

## 2018-10-05 PROCEDURE — 80202 ASSAY OF VANCOMYCIN: CPT

## 2018-10-05 PROCEDURE — 6360000002 HC RX W HCPCS: Performed by: SURGERY

## 2018-10-05 PROCEDURE — 6360000002 HC RX W HCPCS: Performed by: INTERNAL MEDICINE

## 2018-10-05 PROCEDURE — 2709999900 HC NON-CHARGEABLE SUPPLY: Performed by: SURGERY

## 2018-10-05 PROCEDURE — 99152 MOD SED SAME PHYS/QHP 5/>YRS: CPT | Performed by: SURGERY

## 2018-10-05 PROCEDURE — 6370000000 HC RX 637 (ALT 250 FOR IP): Performed by: NURSE PRACTITIONER

## 2018-10-05 PROCEDURE — 94760 N-INVAS EAR/PLS OXIMETRY 1: CPT

## 2018-10-05 PROCEDURE — 3609009500 HC COLONOSCOPY DIAGNOSTIC OR SCREENING: Performed by: SURGERY

## 2018-10-05 PROCEDURE — 36415 COLL VENOUS BLD VENIPUNCTURE: CPT

## 2018-10-05 PROCEDURE — 43239 EGD BIOPSY SINGLE/MULTIPLE: CPT | Performed by: SURGERY

## 2018-10-05 PROCEDURE — 99231 SBSQ HOSP IP/OBS SF/LOW 25: CPT | Performed by: PEDIATRICS

## 2018-10-05 PROCEDURE — 2580000003 HC RX 258: Performed by: NURSE PRACTITIONER

## 2018-10-05 PROCEDURE — 0DB78ZX EXCISION OF STOMACH, PYLORUS, VIA NATURAL OR ARTIFICIAL OPENING ENDOSCOPIC, DIAGNOSTIC: ICD-10-PCS | Performed by: SURGERY

## 2018-10-05 PROCEDURE — 80048 BASIC METABOLIC PNL TOTAL CA: CPT

## 2018-10-05 PROCEDURE — 99153 MOD SED SAME PHYS/QHP EA: CPT | Performed by: SURGERY

## 2018-10-05 PROCEDURE — 85027 COMPLETE CBC AUTOMATED: CPT

## 2018-10-05 PROCEDURE — 6370000000 HC RX 637 (ALT 250 FOR IP): Performed by: SURGERY

## 2018-10-05 PROCEDURE — 1200000000 HC SEMI PRIVATE

## 2018-10-05 PROCEDURE — 6360000002 HC RX W HCPCS: Performed by: NURSE PRACTITIONER

## 2018-10-05 PROCEDURE — 0DJD8ZZ INSPECTION OF LOWER INTESTINAL TRACT, VIA NATURAL OR ARTIFICIAL OPENING ENDOSCOPIC: ICD-10-PCS | Performed by: SURGERY

## 2018-10-05 PROCEDURE — 6370000000 HC RX 637 (ALT 250 FOR IP): Performed by: PEDIATRICS

## 2018-10-05 PROCEDURE — 3609012400 HC EGD TRANSORAL BIOPSY SINGLE/MULTIPLE: Performed by: SURGERY

## 2018-10-05 PROCEDURE — 7100000011 HC PHASE II RECOVERY - ADDTL 15 MIN: Performed by: SURGERY

## 2018-10-05 PROCEDURE — 2580000003 HC RX 258: Performed by: SURGERY

## 2018-10-05 PROCEDURE — 45378 DIAGNOSTIC COLONOSCOPY: CPT | Performed by: SURGERY

## 2018-10-05 PROCEDURE — 7100000010 HC PHASE II RECOVERY - FIRST 15 MIN: Performed by: SURGERY

## 2018-10-05 PROCEDURE — 88305 TISSUE EXAM BY PATHOLOGIST: CPT

## 2018-10-05 PROCEDURE — 2700000000 HC OXYGEN THERAPY PER DAY

## 2018-10-05 PROCEDURE — 94640 AIRWAY INHALATION TREATMENT: CPT

## 2018-10-05 RX ORDER — 0.9 % SODIUM CHLORIDE 0.9 %
10 VIAL (ML) INJECTION PRN
Status: DISCONTINUED | OUTPATIENT
Start: 2018-10-05 | End: 2018-10-07 | Stop reason: HOSPADM

## 2018-10-05 RX ORDER — MEPERIDINE HYDROCHLORIDE 50 MG/ML
INJECTION INTRAMUSCULAR; INTRAVENOUS; SUBCUTANEOUS PRN
Status: DISCONTINUED | OUTPATIENT
Start: 2018-10-05 | End: 2018-10-07 | Stop reason: HOSPADM

## 2018-10-05 RX ORDER — 0.9 % SODIUM CHLORIDE 0.9 %
10 VIAL (ML) INJECTION EVERY 12 HOURS SCHEDULED
Status: DISCONTINUED | OUTPATIENT
Start: 2018-10-05 | End: 2018-10-07 | Stop reason: HOSPADM

## 2018-10-05 RX ORDER — SODIUM CHLORIDE, SODIUM LACTATE, POTASSIUM CHLORIDE, CALCIUM CHLORIDE 600; 310; 30; 20 MG/100ML; MG/100ML; MG/100ML; MG/100ML
INJECTION, SOLUTION INTRAVENOUS ONCE
Status: COMPLETED | OUTPATIENT
Start: 2018-10-05 | End: 2018-10-05

## 2018-10-05 RX ORDER — MIDAZOLAM HYDROCHLORIDE 1 MG/ML
INJECTION INTRAMUSCULAR; INTRAVENOUS PRN
Status: DISCONTINUED | OUTPATIENT
Start: 2018-10-05 | End: 2018-10-07 | Stop reason: HOSPADM

## 2018-10-05 RX ORDER — LIDOCAINE HYDROCHLORIDE 40 MG/ML
SOLUTION TOPICAL PRN
Status: DISCONTINUED | OUTPATIENT
Start: 2018-10-05 | End: 2018-10-07 | Stop reason: HOSPADM

## 2018-10-05 RX ADMIN — FAMOTIDINE 20 MG: 20 TABLET ORAL at 20:07

## 2018-10-05 RX ADMIN — Medication 10 ML: at 13:38

## 2018-10-05 RX ADMIN — GABAPENTIN 300 MG: 300 CAPSULE ORAL at 20:07

## 2018-10-05 RX ADMIN — VITAMIN D, TAB 1000IU (100/BT) 2000 UNITS: 25 TAB at 13:36

## 2018-10-05 RX ADMIN — ALLOPURINOL 300 MG: 100 TABLET ORAL at 13:35

## 2018-10-05 RX ADMIN — LINAGLIPTIN 5 MG: 5 TABLET, FILM COATED ORAL at 13:36

## 2018-10-05 RX ADMIN — INSULIN LISPRO 1 UNITS: 100 INJECTION, SOLUTION INTRAVENOUS; SUBCUTANEOUS at 17:30

## 2018-10-05 RX ADMIN — ENOXAPARIN SODIUM 40 MG: 100 INJECTION SUBCUTANEOUS at 13:38

## 2018-10-05 RX ADMIN — IPRATROPIUM BROMIDE AND ALBUTEROL SULFATE 1 AMPULE: .5; 3 SOLUTION RESPIRATORY (INHALATION) at 11:38

## 2018-10-05 RX ADMIN — GABAPENTIN 300 MG: 300 CAPSULE ORAL at 13:35

## 2018-10-05 RX ADMIN — ZINC SULFATE 220 MG (50 MG) CAPSULE 220 MG: CAPSULE at 13:36

## 2018-10-05 RX ADMIN — INSULIN GLARGINE 35 UNITS: 100 INJECTION, SOLUTION SUBCUTANEOUS at 20:08

## 2018-10-05 RX ADMIN — FUROSEMIDE 40 MG: 40 TABLET ORAL at 13:35

## 2018-10-05 RX ADMIN — AMLODIPINE BESYLATE 5 MG: 5 TABLET ORAL at 13:36

## 2018-10-05 RX ADMIN — IPRATROPIUM BROMIDE AND ALBUTEROL SULFATE 1 AMPULE: .5; 3 SOLUTION RESPIRATORY (INHALATION) at 07:30

## 2018-10-05 RX ADMIN — IPRATROPIUM BROMIDE AND ALBUTEROL SULFATE 1 AMPULE: .5; 3 SOLUTION RESPIRATORY (INHALATION) at 19:32

## 2018-10-05 RX ADMIN — MICONAZOLE NITRATE: 20 CREAM TOPICAL at 20:07

## 2018-10-05 RX ADMIN — NYSTATIN: 100000 CREAM TOPICAL at 20:12

## 2018-10-05 RX ADMIN — INSULIN LISPRO 1 UNITS: 100 INJECTION, SOLUTION INTRAVENOUS; SUBCUTANEOUS at 20:08

## 2018-10-05 RX ADMIN — GLIPIZIDE 5 MG: 5 TABLET ORAL at 17:42

## 2018-10-05 RX ADMIN — Medication 10 ML: at 08:15

## 2018-10-05 RX ADMIN — OXYCODONE HYDROCHLORIDE AND ACETAMINOPHEN 500 MG: 500 TABLET ORAL at 13:35

## 2018-10-05 RX ADMIN — FAMOTIDINE 20 MG: 20 TABLET ORAL at 13:35

## 2018-10-05 RX ADMIN — SIMVASTATIN 20 MG: 20 TABLET, FILM COATED ORAL at 20:07

## 2018-10-05 RX ADMIN — IPRATROPIUM BROMIDE AND ALBUTEROL SULFATE 1 AMPULE: .5; 3 SOLUTION RESPIRATORY (INHALATION) at 15:11

## 2018-10-05 RX ADMIN — VANCOMYCIN HYDROCHLORIDE 1000 MG: 1 INJECTION, SOLUTION INTRAVENOUS at 23:17

## 2018-10-05 RX ADMIN — SODIUM CHLORIDE, POTASSIUM CHLORIDE, SODIUM LACTATE AND CALCIUM CHLORIDE: 600; 310; 30; 20 INJECTION, SOLUTION INTRAVENOUS at 08:15

## 2018-10-05 NOTE — OP NOTE
Colonoscopy & Esophagogastroduodenoscopy Note  Patient:   Jim Tian    YOB: 1960    Facility:   [address] [place] [address]    Referring/PCP: JOSE Bowers  Procedure:   Colonoscopy ;     Esophagogastroduodenoscopy with Biopsies  --diagnostic  Date:     10/5/2018  Endoscopist:  Venkata Macario MD    Preoperative Diagnosis:  iron deficiency anemia. Unexplained iron deficiency anaemia    Postoperative Diagnosis: diverticulosis,  Moderate in degree, involving the entire colon  hemorrhoids internal, Small in sizeesophagitis mod reflux and gastritis antral    Anesthesia: Demerol 75 mg IV and Versed 4 mg IV    Estimated blood loss: None    Complications: None    Description of Procedure:  Informed consent was obtained from the patient after explanation of the procedure including indications, description of the procedure,  benefits and possible risks and complications of the procedure, and alternatives. Questions were answered. The patient's history was reviewed and a directed physical examination was performed prior to the procedure. Patient was monitored throughout the procedure with pulse oximetry and periodic assessment of vital signs. Patient was sedated as noted above. With the patient initially in the left lateral decubitus position, a digital rectal examination was performed and revealed negative without mass, lesions or tenderness. The Olympus video colonoscope was placed in the patient's rectum and advanced without difficulty  to the cecum, which was identified by the ileocecal valve and appendiceal orifice. Photographs were taken. --diagnostic  The prep was good. Examination of the mucosa was performed during both introduction and withdrawal of the colonoscope. Retroflexed view of the rectum was performed. Withdrawal time was 7 minutes.      Findings:     1. diverticulosis,  Moderate in degree, involving the entire colon  hemorrhoids internal, Small in size    With the patient in the left lateral decubitus position, the Olympus videoendoscope was placed in the patient's mouth and under direct visualization passed into the esophagus. The scope was ultimately passed to the entire esophagus, the entire stomach, the second portion of the duodenum. Cold biopsy forceps biopsies were obtained of gastric antral mucosa. Visualization was performed during both introduction and withdrawal of the endoscope and retroflexed view of the proximal stomach was obtained. Findings[de-identified]   Esophagus: Hiatal Hernia with mod reflux esophagitis; normal esophageal contractions }. The findings do not support a diagnosis of Bowens's Esophagus. Stomach: abnormal: antral gastritis  Duodenum: normal       Recommendations: For colon cancer screening in this average-risk patient, colonoscopy may be repeated in 5 years -Continue acid suppression. , -Await pathology. , -Follow up with me.       Electronically signed by Isreal Christiansen MD on 10/5/2018 at 9:42 AM

## 2018-10-05 NOTE — PROGRESS NOTES
both lower extremities [L03.115, L03.116]  Improved. Continue abx and current regimen   10/02/2018    Hypothyroidism [E03.9]  Continue synthroid. TSH needs to be rechecked in 4-6 weeks       Type 2 diabetes mellitus with complication ([facility] [address] [I72.3]  Continue regimen.  Cover with SSI per protocol if indicated        The case was discussed with Dr. Johny Vazquez by phone and plan of care reviewed      Electronically signed by JOSE Richmond on 10/4/2018 at 8:44 PM

## 2018-10-05 NOTE — PROGRESS NOTES
Other RX Placeholder    levothyroxine  50 mcg Oral Daily    sodium chloride  250 mL Intravenous Once     Continuous Infusions:   dextrose         Objective:   Vitals: BP (!) 143/70   Pulse 70   Temp 97.7 °F (36.5 °C) (Oral)   Resp 18   Ht 5' 5\" (1.651 m)   Wt (!) 339 lb 8 oz (154 kg)   SpO2 94%   BMI 56.50 kg/m²     Physical exam  Physical Exam   Constitutional: She is oriented to person, place, and time. She appears well-developed and well-nourished. No distress. obese   HENT:   Head: Normocephalic and atraumatic. Nose: Nose normal.   Mouth/Throat: Oropharynx is clear and moist.   Eyes: Pupils are equal, round, and reactive to light. Conjunctivae and EOM are normal. Right eye exhibits no discharge. Left eye exhibits no discharge. No scleral icterus. Neck: Normal range of motion. Neck supple. No JVD present. No tracheal deviation present. No thyromegaly present. Cardiovascular: Normal rate, regular rhythm and normal heart sounds. No murmur heard. Pulmonary/Chest: Effort normal and breath sounds normal. No stridor. No respiratory distress. She has no wheezes. She has no rales. She exhibits no tenderness. Abdominal: Soft. Bowel sounds are normal. She exhibits no distension and no mass. There is no tenderness. There is no rebound and no guarding. Musculoskeletal: Normal range of motion. She exhibits no edema or tenderness. Lymphadenopathy:     She has no cervical adenopathy. Neurological: She is alert and oriented to person, place, and time. Skin: Skin is warm and dry. No rash noted. She is not diaphoretic. Lymphedema and venous stasis changes bilateral lower extremities. Circumferential  overlying cellulitis with mild erythema, no discharge noted to bilateral lower extremities that begins above the ankle and extends to just below the knees. Psychiatric: She has a normal mood and affect. Nursing note and vitals reviewed.        Results:   10/5/2018  5:44 AM - [facility] Incoming Lab Results From [facility] (Epic Adt)     Component Results     Component Value Ref Range & Units Status Collected Lab   WBC 8.2  4.0 - 11.0 K/uL Final 10/05/2018  5:30 AM [facility]   RBC 4.05  3.80 - 5.80 M/uL Final 10/05/2018  5:30 AM [facility]   Hemoglobin 9.7   11.5 - 16.5 g/dL Final 10/05/2018  5:30 AM [facility]   Hematocrit 32.1   37.0 - 47.0 % Final 10/05/2018  5:30 AM [facility]   MCV 79.3   80.0 - 100.0 fL Final 10/05/2018  5:30 AM [facility]   MCH 24.0   27.0 - 32.0 pg Final 10/05/2018  5:30 AM [facility]   MCHC 30.2   31.0 - 35.0 g/dL Final 10/05/2018  5:30 AM [facility]   RDW 20.6   11.0 - 16.0 % Final 10/05/2018  5:30 AM [facility]   Platelets 238  787 - 400 K/uL Final 10/05/2018  5:30 AM [facility]   MPV 10.0  6.0 - 10.0 fL Final 10/05/2018  5:30 AM [facility]       Assessment and Plan: Active Hospital Problems    Diagnosis Date Noted    Abdominal pain, left lower quadrant [R10.32]     Chronic superficial gastritis without bleeding [K29.30]     Internal hemorrhoids without complication [H00.0]     Diverticulosis of large intestine without hemorrhage [K57.30]     Anemia requiring transfusions [D64.9] 10/02/2018    Cellulitis of both lower extremities [L03.115, L03.116] 10/02/2018    Hypothyroidism [E03.9]     Type 2 diabetes mellitus with complication ([facility] [address] [R56.8]      Continue current plan, no changes at this time. Will monitor of any blood loss or complications related to EGD and colonoscopy. Continue antibiotics for cellulitis of lower extremities.     Electronically signed by Checo Colón DO on 10/5/2018 at 6:53 PM

## 2018-10-05 NOTE — PROGRESS NOTES
Received the patient back from [facility]. Patient sleepy but easily awakens to verbal stimuli. Side rails up X 3. Call bell within reach. Patient not alert enough at this time to give oral am medications att.

## 2018-10-05 NOTE — CARE COORDINATION
No physical therapy on this date secondary to patient having medical procedures this morning.   Electronically signed by Maninder Cleaning PT on 10/5/2018 at 2:08 PM

## 2018-10-06 LAB
ANION GAP SERPL CALCULATED.3IONS-SCNC: 12 MMOL/L (ref 3–16)
BUN BLDV-MCNC: 27 MG/DL (ref 6–20)
CALCIUM SERPL-MCNC: 8.8 MG/DL (ref 8.5–10.5)
CHLORIDE BLD-SCNC: 104 MMOL/L (ref 98–107)
CO2: 25 MMOL/L (ref 20–30)
CREAT SERPL-MCNC: 1.7 MG/DL (ref 0.4–1.2)
GFR AFRICAN AMERICAN: 37
GFR NON-AFRICAN AMERICAN: 31
GLUCOSE BLD-MCNC: 112 MG/DL (ref 74–106)
GLUCOSE BLD-MCNC: 154 MG/DL (ref 74–106)
GLUCOSE BLD-MCNC: 156 MG/DL (ref 74–106)
GLUCOSE BLD-MCNC: 169 MG/DL (ref 74–106)
GLUCOSE BLD-MCNC: 171 MG/DL (ref 74–106)
HBA1C MFR BLD: 6.6 %
HCT VFR BLD CALC: 30.9 % (ref 37–47)
HEMOGLOBIN: 9.1 G/DL (ref 11.5–16.5)
MCH RBC QN AUTO: 23.3 PG (ref 27–32)
MCHC RBC AUTO-ENTMCNC: 29.4 G/DL (ref 31–35)
MCV RBC AUTO: 79.2 FL (ref 80–100)
PDW BLD-RTO: 21.4 % (ref 11–16)
PERFORMED ON: ABNORMAL
PLATELET # BLD: 235 K/UL (ref 150–400)
PMV BLD AUTO: 10.2 FL (ref 6–10)
POTASSIUM REFLEX MAGNESIUM: 4.9 MMOL/L (ref 3.4–5.1)
RBC # BLD: 3.9 M/UL (ref 3.8–5.8)
SODIUM BLD-SCNC: 141 MMOL/L (ref 136–145)
WBC # BLD: 9.7 K/UL (ref 4–11)

## 2018-10-06 PROCEDURE — 94640 AIRWAY INHALATION TREATMENT: CPT

## 2018-10-06 PROCEDURE — 6370000000 HC RX 637 (ALT 250 FOR IP): Performed by: NURSE PRACTITIONER

## 2018-10-06 PROCEDURE — 83036 HEMOGLOBIN GLYCOSYLATED A1C: CPT

## 2018-10-06 PROCEDURE — 6360000002 HC RX W HCPCS: Performed by: NURSE PRACTITIONER

## 2018-10-06 PROCEDURE — 94760 N-INVAS EAR/PLS OXIMETRY 1: CPT

## 2018-10-06 PROCEDURE — 6370000000 HC RX 637 (ALT 250 FOR IP): Performed by: PEDIATRICS

## 2018-10-06 PROCEDURE — 2580000003 HC RX 258: Performed by: SURGERY

## 2018-10-06 PROCEDURE — 6360000002 HC RX W HCPCS: Performed by: INTERNAL MEDICINE

## 2018-10-06 PROCEDURE — 2580000003 HC RX 258: Performed by: NURSE PRACTITIONER

## 2018-10-06 PROCEDURE — 1200000000 HC SEMI PRIVATE

## 2018-10-06 PROCEDURE — 99232 SBSQ HOSP IP/OBS MODERATE 35: CPT | Performed by: PEDIATRICS

## 2018-10-06 PROCEDURE — 36415 COLL VENOUS BLD VENIPUNCTURE: CPT

## 2018-10-06 PROCEDURE — 85027 COMPLETE CBC AUTOMATED: CPT

## 2018-10-06 PROCEDURE — 80048 BASIC METABOLIC PNL TOTAL CA: CPT

## 2018-10-06 RX ORDER — IPRATROPIUM BROMIDE AND ALBUTEROL SULFATE 2.5; .5 MG/3ML; MG/3ML
1 SOLUTION RESPIRATORY (INHALATION) EVERY 4 HOURS PRN
Status: DISCONTINUED | OUTPATIENT
Start: 2018-10-06 | End: 2018-10-07 | Stop reason: HOSPADM

## 2018-10-06 RX ADMIN — GABAPENTIN 300 MG: 300 CAPSULE ORAL at 17:07

## 2018-10-06 RX ADMIN — ZINC SULFATE 220 MG (50 MG) CAPSULE 220 MG: CAPSULE at 08:20

## 2018-10-06 RX ADMIN — LEVOTHYROXINE SODIUM 50 MCG: 50 TABLET ORAL at 05:42

## 2018-10-06 RX ADMIN — FAMOTIDINE 20 MG: 20 TABLET ORAL at 08:21

## 2018-10-06 RX ADMIN — NYSTATIN: 100000 CREAM TOPICAL at 08:27

## 2018-10-06 RX ADMIN — IPRATROPIUM BROMIDE AND ALBUTEROL SULFATE 1 AMPULE: .5; 3 SOLUTION RESPIRATORY (INHALATION) at 05:26

## 2018-10-06 RX ADMIN — VITAMIN D, TAB 1000IU (100/BT) 2000 UNITS: 25 TAB at 08:21

## 2018-10-06 RX ADMIN — MICONAZOLE NITRATE: 20 CREAM TOPICAL at 08:27

## 2018-10-06 RX ADMIN — INSULIN LISPRO 1 UNITS: 100 INJECTION, SOLUTION INTRAVENOUS; SUBCUTANEOUS at 20:09

## 2018-10-06 RX ADMIN — NYSTATIN: 100000 CREAM TOPICAL at 20:04

## 2018-10-06 RX ADMIN — OXYCODONE HYDROCHLORIDE AND ACETAMINOPHEN 500 MG: 500 TABLET ORAL at 08:21

## 2018-10-06 RX ADMIN — SIMVASTATIN 20 MG: 20 TABLET, FILM COATED ORAL at 20:04

## 2018-10-06 RX ADMIN — Medication 10 ML: at 20:04

## 2018-10-06 RX ADMIN — INSULIN LISPRO 1 UNITS: 100 INJECTION, SOLUTION INTRAVENOUS; SUBCUTANEOUS at 11:41

## 2018-10-06 RX ADMIN — INSULIN GLARGINE 35 UNITS: 100 INJECTION, SOLUTION SUBCUTANEOUS at 20:10

## 2018-10-06 RX ADMIN — INSULIN LISPRO 1 UNITS: 100 INJECTION, SOLUTION INTRAVENOUS; SUBCUTANEOUS at 08:20

## 2018-10-06 RX ADMIN — FUROSEMIDE 40 MG: 40 TABLET ORAL at 08:20

## 2018-10-06 RX ADMIN — VANCOMYCIN HYDROCHLORIDE 1000 MG: 1 INJECTION, SOLUTION INTRAVENOUS at 21:48

## 2018-10-06 RX ADMIN — ALLOPURINOL 300 MG: 100 TABLET ORAL at 08:20

## 2018-10-06 RX ADMIN — MICONAZOLE NITRATE: 20 CREAM TOPICAL at 20:04

## 2018-10-06 RX ADMIN — Medication 10 ML: at 08:28

## 2018-10-06 RX ADMIN — FAMOTIDINE 20 MG: 20 TABLET ORAL at 20:04

## 2018-10-06 RX ADMIN — GABAPENTIN 300 MG: 300 CAPSULE ORAL at 20:04

## 2018-10-06 RX ADMIN — LINAGLIPTIN 5 MG: 5 TABLET, FILM COATED ORAL at 08:21

## 2018-10-06 RX ADMIN — GLIPIZIDE 5 MG: 5 TABLET ORAL at 17:07

## 2018-10-06 RX ADMIN — GLIPIZIDE 5 MG: 5 TABLET ORAL at 05:42

## 2018-10-06 RX ADMIN — GABAPENTIN 300 MG: 300 CAPSULE ORAL at 08:21

## 2018-10-06 RX ADMIN — Medication 10 ML: at 20:05

## 2018-10-06 RX ADMIN — AMLODIPINE BESYLATE 5 MG: 5 TABLET ORAL at 08:21

## 2018-10-06 RX ADMIN — LEVOFLOXACIN 750 MG: 5 INJECTION, SOLUTION INTRAVENOUS at 02:17

## 2018-10-06 RX ADMIN — ENOXAPARIN SODIUM 40 MG: 100 INJECTION SUBCUTANEOUS at 08:21

## 2018-10-06 NOTE — PROGRESS NOTES
Progress Note      Subjective:   Chief complaint: Bilateral leg swelling    Interval History: Patient states she wants to go home today. Discussed importance of antibiotics and her anemia at this time and agreeable to stay another day. Requiring 2L NC; not on home O2. Patient may need home O2 and will discuss with case management prior to discharge. BLE with 2+ swelling and redness improving. No complaints at this time. Review of systems:     Constitutional:  Denies fever or chills   Eyes:  Denies change in visual acuity or discharge  HENT:  Denies nasal congestion or sore throat   Respiratory:  Denies cough or shortness of breath. 2L NC   Cardiovascular:  BLE swelling. Denies chest pain, palpitation  GI:  Denies abdominal pain, nausea, vomiting, bloody stools or diarrhea   :  Denies dysuria or frequency   Musculoskeletal:  Denies acute back pain. Reports chronic joint pains. Integument:  BLE redness. Denies rash or itching  Neurologic:  Denies headache, dizziness or focal weakness  Psychiatric:  Denies acute depression or anxiety     Past medical history, surgical history, family history and social history reviewed and unchanged compared to H&P earlier this admission.     Medications:   Scheduled Meds:   sodium chloride (PF)  10 mL Intravenous 2 times per day    vancomycin  1,000 mg Intravenous Q24H    allopurinol  300 mg Oral Daily    amLODIPine  5 mg Oral Daily    furosemide  40 mg Oral Daily    gabapentin  300 mg Oral TID    glipiZIDE  5 mg Oral BID AC    insulin glargine  35 Units Subcutaneous Nightly    lidocaine  1 patch Transdermal Daily    nystatin   Topical BID    zinc sulfate  220 mg Oral Daily    vitamin C  500 mg Oral Daily    linagliptin  5 mg Oral Daily    simvastatin  20 mg Oral Nightly    famotidine  20 mg Oral BID    aspirin  81 mg Oral Daily    vitamin D  2,000 Units Oral Daily    insulin lispro  0-6 Units Subcutaneous TID WC    insulin lispro  0-3 Units Subcutaneous

## 2018-10-07 VITALS
SYSTOLIC BLOOD PRESSURE: 174 MMHG | DIASTOLIC BLOOD PRESSURE: 78 MMHG | OXYGEN SATURATION: 91 % | WEIGHT: 293 LBS | BODY MASS INDEX: 48.82 KG/M2 | RESPIRATION RATE: 16 BRPM | HEIGHT: 65 IN | TEMPERATURE: 98.1 F | HEART RATE: 77 BPM

## 2018-10-07 LAB
ANION GAP SERPL CALCULATED.3IONS-SCNC: 12 MMOL/L (ref 3–16)
BLOOD CULTURE, ROUTINE: NORMAL
BUN BLDV-MCNC: 27 MG/DL (ref 6–20)
CALCIUM SERPL-MCNC: 9.1 MG/DL (ref 8.5–10.5)
CHLORIDE BLD-SCNC: 104 MMOL/L (ref 98–107)
CO2: 25 MMOL/L (ref 20–30)
CREAT SERPL-MCNC: 1.7 MG/DL (ref 0.4–1.2)
CULTURE, BLOOD 2: NORMAL
GFR AFRICAN AMERICAN: 37
GFR NON-AFRICAN AMERICAN: 31
GLUCOSE BLD-MCNC: 103 MG/DL (ref 74–106)
GLUCOSE BLD-MCNC: 104 MG/DL (ref 74–106)
GLUCOSE BLD-MCNC: 96 MG/DL (ref 74–106)
HCT VFR BLD CALC: 32.9 % (ref 37–47)
HEMOGLOBIN: 9.6 G/DL (ref 11.5–16.5)
MCH RBC QN AUTO: 23.7 PG (ref 27–32)
MCHC RBC AUTO-ENTMCNC: 29.2 G/DL (ref 31–35)
MCV RBC AUTO: 81.2 FL (ref 80–100)
PDW BLD-RTO: 22.5 % (ref 11–16)
PERFORMED ON: NORMAL
PERFORMED ON: NORMAL
PLATELET # BLD: 238 K/UL (ref 150–400)
PMV BLD AUTO: 10.1 FL (ref 6–10)
POTASSIUM SERPL-SCNC: 4.6 MMOL/L (ref 3.4–5.1)
RBC # BLD: 4.05 M/UL (ref 3.8–5.8)
SODIUM BLD-SCNC: 141 MMOL/L (ref 136–145)
WBC # BLD: 11.6 K/UL (ref 4–11)

## 2018-10-07 PROCEDURE — 6370000000 HC RX 637 (ALT 250 FOR IP): Performed by: NURSE PRACTITIONER

## 2018-10-07 PROCEDURE — 99238 HOSP IP/OBS DSCHRG MGMT 30/<: CPT | Performed by: PEDIATRICS

## 2018-10-07 PROCEDURE — 85027 COMPLETE CBC AUTOMATED: CPT

## 2018-10-07 PROCEDURE — 6360000002 HC RX W HCPCS: Performed by: NURSE PRACTITIONER

## 2018-10-07 PROCEDURE — 80048 BASIC METABOLIC PNL TOTAL CA: CPT

## 2018-10-07 PROCEDURE — 36415 COLL VENOUS BLD VENIPUNCTURE: CPT

## 2018-10-07 PROCEDURE — 6370000000 HC RX 637 (ALT 250 FOR IP): Performed by: PEDIATRICS

## 2018-10-07 RX ORDER — AMLODIPINE BESYLATE 5 MG/1
10 TABLET ORAL DAILY
Status: DISCONTINUED | OUTPATIENT
Start: 2018-10-08 | End: 2018-10-07 | Stop reason: HOSPADM

## 2018-10-07 RX ORDER — LEVOFLOXACIN 500 MG/1
500 TABLET, FILM COATED ORAL DAILY
Qty: 7 TABLET | Refills: 0 | Status: SHIPPED | OUTPATIENT
Start: 2018-10-07 | End: 2018-10-14

## 2018-10-07 RX ORDER — LEVOTHYROXINE SODIUM 0.05 MG/1
50 TABLET ORAL DAILY
Qty: 30 TABLET | Refills: 0 | Status: ON HOLD | OUTPATIENT
Start: 2018-10-08 | End: 2019-06-06 | Stop reason: HOSPADM

## 2018-10-07 RX ORDER — FERROUS SULFATE TAB EC 324 MG (65 MG FE EQUIVALENT) 324 (65 FE) MG
324 TABLET DELAYED RESPONSE ORAL 2 TIMES DAILY WITH MEALS
Status: DISCONTINUED | OUTPATIENT
Start: 2018-10-07 | End: 2018-10-07 | Stop reason: HOSPADM

## 2018-10-07 RX ORDER — ALBUTEROL SULFATE 90 UG/1
2 AEROSOL, METERED RESPIRATORY (INHALATION) EVERY 6 HOURS PRN
Qty: 1 INHALER | Refills: 1 | Status: ON HOLD | OUTPATIENT
Start: 2018-10-07 | End: 2019-02-05

## 2018-10-07 RX ORDER — FERROUS SULFATE TAB EC 324 MG (65 MG FE EQUIVALENT) 324 (65 FE) MG
324 TABLET DELAYED RESPONSE ORAL 2 TIMES DAILY WITH MEALS
Qty: 30 TABLET | Refills: 1 | Status: SHIPPED | OUTPATIENT
Start: 2018-10-07 | End: 2018-10-24 | Stop reason: SDUPTHER

## 2018-10-07 RX ORDER — FERROUS SULFATE TAB EC 324 MG (65 MG FE EQUIVALENT) 324 (65 FE) MG
324 TABLET DELAYED RESPONSE ORAL
Status: DISCONTINUED | OUTPATIENT
Start: 2018-10-08 | End: 2018-10-07

## 2018-10-07 RX ORDER — AMLODIPINE BESYLATE 10 MG/1
10 TABLET ORAL DAILY
Qty: 30 TABLET | Refills: 1 | Status: ON HOLD | OUTPATIENT
Start: 2018-10-08 | End: 2019-02-05

## 2018-10-07 RX ADMIN — FUROSEMIDE 40 MG: 40 TABLET ORAL at 08:59

## 2018-10-07 RX ADMIN — GABAPENTIN 300 MG: 300 CAPSULE ORAL at 08:59

## 2018-10-07 RX ADMIN — NYSTATIN: 100000 CREAM TOPICAL at 09:00

## 2018-10-07 RX ADMIN — OXYCODONE HYDROCHLORIDE AND ACETAMINOPHEN 500 MG: 500 TABLET ORAL at 08:59

## 2018-10-07 RX ADMIN — MICONAZOLE NITRATE: 20 CREAM TOPICAL at 09:00

## 2018-10-07 RX ADMIN — FERROUS SULFATE TAB EC 324 MG (65 MG FE EQUIVALENT) 324 MG: 324 (65 FE) TABLET DELAYED RESPONSE at 12:29

## 2018-10-07 RX ADMIN — VITAMIN D, TAB 1000IU (100/BT) 2000 UNITS: 25 TAB at 08:59

## 2018-10-07 RX ADMIN — ALLOPURINOL 300 MG: 100 TABLET ORAL at 08:59

## 2018-10-07 RX ADMIN — AMLODIPINE BESYLATE 5 MG: 5 TABLET ORAL at 08:59

## 2018-10-07 RX ADMIN — FAMOTIDINE 20 MG: 20 TABLET ORAL at 08:59

## 2018-10-07 RX ADMIN — ENOXAPARIN SODIUM 40 MG: 100 INJECTION SUBCUTANEOUS at 08:59

## 2018-10-07 RX ADMIN — ZINC SULFATE 220 MG (50 MG) CAPSULE 220 MG: CAPSULE at 08:59

## 2018-10-07 RX ADMIN — LINAGLIPTIN 5 MG: 5 TABLET, FILM COATED ORAL at 08:59

## 2018-10-07 RX ADMIN — LEVOTHYROXINE SODIUM 50 MCG: 50 TABLET ORAL at 06:17

## 2018-10-07 RX ADMIN — GLIPIZIDE 5 MG: 5 TABLET ORAL at 06:17

## 2018-10-08 ENCOUNTER — CARE COORDINATION (OUTPATIENT)
Dept: CARE COORDINATION | Age: 58
End: 2018-10-08

## 2018-10-17 RX ORDER — PROMETHAZINE HYDROCHLORIDE 25 MG/1
25 TABLET ORAL EVERY 6 HOURS PRN
Qty: 30 TABLET | Refills: 5 | Status: ON HOLD | OUTPATIENT
Start: 2018-10-17 | End: 2019-02-05

## 2018-10-24 ENCOUNTER — OFFICE VISIT (OUTPATIENT)
Dept: PRIMARY CARE CLINIC | Age: 58
End: 2018-10-24
Payer: MEDICARE

## 2018-10-24 ENCOUNTER — HOSPITAL ENCOUNTER (OUTPATIENT)
Facility: HOSPITAL | Age: 58
Discharge: HOME | End: 2018-10-24
Payer: MEDICARE

## 2018-10-24 VITALS
WEIGHT: 293 LBS | BODY MASS INDEX: 50.92 KG/M2 | OXYGEN SATURATION: 95 % | SYSTOLIC BLOOD PRESSURE: 140 MMHG | HEART RATE: 96 BPM | DIASTOLIC BLOOD PRESSURE: 70 MMHG

## 2018-10-24 DIAGNOSIS — E11.8 TYPE 2 DIABETES MELLITUS WITH COMPLICATION, WITH LONG-TERM CURRENT USE OF INSULIN (HCC): ICD-10-CM

## 2018-10-24 DIAGNOSIS — R79.89 ELEVATED SERUM CREATININE: ICD-10-CM

## 2018-10-24 DIAGNOSIS — E61.1 IRON DEFICIENCY: ICD-10-CM

## 2018-10-24 DIAGNOSIS — Z79.4 TYPE 2 DIABETES MELLITUS WITH COMPLICATION, WITH LONG-TERM CURRENT USE OF INSULIN (HCC): ICD-10-CM

## 2018-10-24 DIAGNOSIS — I10 ESSENTIAL HYPERTENSION: ICD-10-CM

## 2018-10-24 DIAGNOSIS — L03.119 CELLULITIS OF LOWER EXTREMITY, UNSPECIFIED LATERALITY: ICD-10-CM

## 2018-10-24 DIAGNOSIS — E03.9 HYPOTHYROIDISM, UNSPECIFIED TYPE: ICD-10-CM

## 2018-10-24 DIAGNOSIS — L03.119 CELLULITIS OF LOWER EXTREMITY, UNSPECIFIED LATERALITY: Primary | ICD-10-CM

## 2018-10-24 PROCEDURE — 3044F HG A1C LEVEL LT 7.0%: CPT | Performed by: NURSE PRACTITIONER

## 2018-10-24 PROCEDURE — 99213 OFFICE O/P EST LOW 20 MIN: CPT | Performed by: NURSE PRACTITIONER

## 2018-10-24 PROCEDURE — 29580 STRAPPING UNNA BOOT: CPT | Performed by: NURSE PRACTITIONER

## 2018-10-24 PROCEDURE — 83550 IRON BINDING TEST: CPT

## 2018-10-24 PROCEDURE — 1111F DSCHRG MED/CURRENT MED MERGE: CPT | Performed by: NURSE PRACTITIONER

## 2018-10-24 PROCEDURE — 1036F TOBACCO NON-USER: CPT | Performed by: NURSE PRACTITIONER

## 2018-10-24 PROCEDURE — 83540 ASSAY OF IRON: CPT

## 2018-10-24 PROCEDURE — 2022F DILAT RTA XM EVC RTNOPTHY: CPT | Performed by: NURSE PRACTITIONER

## 2018-10-24 PROCEDURE — G0008 ADMIN INFLUENZA VIRUS VAC: HCPCS | Performed by: NURSE PRACTITIONER

## 2018-10-24 PROCEDURE — 82728 ASSAY OF FERRITIN: CPT

## 2018-10-24 PROCEDURE — 3017F COLORECTAL CA SCREEN DOC REV: CPT | Performed by: NURSE PRACTITIONER

## 2018-10-24 PROCEDURE — 90688 IIV4 VACCINE SPLT 0.5 ML IM: CPT | Performed by: NURSE PRACTITIONER

## 2018-10-24 PROCEDURE — 36415 COLL VENOUS BLD VENIPUNCTURE: CPT

## 2018-10-24 PROCEDURE — 84443 ASSAY THYROID STIM HORMONE: CPT

## 2018-10-24 PROCEDURE — 80053 COMPREHEN METABOLIC PANEL: CPT

## 2018-10-24 PROCEDURE — G8482 FLU IMMUNIZE ORDER/ADMIN: HCPCS | Performed by: NURSE PRACTITIONER

## 2018-10-24 PROCEDURE — 85025 COMPLETE CBC W/AUTO DIFF WBC: CPT

## 2018-10-24 PROCEDURE — G8417 CALC BMI ABV UP PARAM F/U: HCPCS | Performed by: NURSE PRACTITIONER

## 2018-10-24 PROCEDURE — G8427 DOCREV CUR MEDS BY ELIG CLIN: HCPCS | Performed by: NURSE PRACTITIONER

## 2018-10-24 RX ORDER — FERROUS SULFATE TAB EC 324 MG (65 MG FE EQUIVALENT) 324 (65 FE) MG
324 TABLET DELAYED RESPONSE ORAL 2 TIMES DAILY WITH MEALS
Qty: 60 TABLET | Refills: 5 | Status: SHIPPED | OUTPATIENT
Start: 2018-10-24 | End: 2019-04-04 | Stop reason: SDUPTHER

## 2018-10-24 ASSESSMENT — ENCOUNTER SYMPTOMS
ABDOMINAL PAIN: 0
SORE THROAT: 0
COUGH: 0
EYE PAIN: 0
VOMITING: 0
NAUSEA: 0
SHORTNESS OF BREATH: 0
SINUS PRESSURE: 1

## 2018-10-24 NOTE — PROGRESS NOTES
Have you seen any other physician or provider since your last visit? no    Have you had any other diagnostic tests since your last visit? no    Have you changed or stopped any medications since your last visit including any over-the-counter medicines, vitamins, or herbal medicines? no     Are you taking all your prescribed medications? Yes  If NO, why? -  N/A      REVIEW OF SYSTEMS:  Review of Systems   Constitutional: Negative for chills and fever. HENT: Positive for sinus pressure. Negative for ear pain and sore throat. Eyes: Negative for pain and visual disturbance. Respiratory: Negative for cough and shortness of breath. Cardiovascular: Negative for chest pain, palpitations and leg swelling. Gastrointestinal: Negative for abdominal pain, nausea and vomiting. Genitourinary: Negative for dysuria and hematuria. Musculoskeletal: Negative for joint swelling. Skin: Negative for rash. Neurological: Positive for headaches. Negative for dizziness and weakness. Psychiatric/Behavioral: Negative for sleep disturbance.

## 2018-10-25 LAB
A/G RATIO: 1.3 (ref 0.8–2)
ALBUMIN SERPL-MCNC: 4.3 G/DL (ref 3.4–4.8)
ALP BLD-CCNC: 141 U/L (ref 25–100)
ALT SERPL-CCNC: 6 U/L (ref 4–36)
ANION GAP SERPL CALCULATED.3IONS-SCNC: 15 MMOL/L (ref 3–16)
AST SERPL-CCNC: 13 U/L (ref 8–33)
BASOPHILS ABSOLUTE: 0 K/UL (ref 0–0.1)
BASOPHILS RELATIVE PERCENT: 0.4 %
BILIRUB SERPL-MCNC: 0.3 MG/DL (ref 0.3–1.2)
BUN BLDV-MCNC: 19 MG/DL (ref 6–20)
CALCIUM SERPL-MCNC: 9.9 MG/DL (ref 8.5–10.5)
CHLORIDE BLD-SCNC: 100 MMOL/L (ref 98–107)
CO2: 27 MMOL/L (ref 20–30)
CREAT SERPL-MCNC: 1.7 MG/DL (ref 0.4–1.2)
EOSINOPHILS ABSOLUTE: 0.2 K/UL (ref 0–0.4)
EOSINOPHILS RELATIVE PERCENT: 1.5 %
FERRITIN: 322.7 NG/ML (ref 22–322)
GFR AFRICAN AMERICAN: 37
GFR NON-AFRICAN AMERICAN: 31
GLOBULIN: 3.3 G/DL
GLUCOSE BLD-MCNC: 85 MG/DL (ref 74–106)
HCT VFR BLD CALC: 38.7 % (ref 37–47)
HEMOGLOBIN: 11.2 G/DL (ref 11.5–16.5)
IMMATURE GRANULOCYTES #: 0 K/UL
IMMATURE GRANULOCYTES %: 0.3 % (ref 0–5)
IRON: 44 UG/DL (ref 37–145)
LYMPHOCYTES ABSOLUTE: 1.5 K/UL (ref 1.5–4)
LYMPHOCYTES RELATIVE PERCENT: 15.4 %
MCH RBC QN AUTO: 23.5 PG (ref 27–32)
MCHC RBC AUTO-ENTMCNC: 28.9 G/DL (ref 31–35)
MCV RBC AUTO: 81.1 FL (ref 80–100)
MONOCYTES ABSOLUTE: 0.6 K/UL (ref 0.2–0.8)
MONOCYTES RELATIVE PERCENT: 5.9 %
NEUTROPHILS ABSOLUTE: 7.4 K/UL (ref 2–7.5)
NEUTROPHILS RELATIVE PERCENT: 76.5 %
PDW BLD-RTO: 21.6 % (ref 11–16)
PLATELET # BLD: 296 K/UL (ref 150–400)
PMV BLD AUTO: 11.3 FL (ref 6–10)
POTASSIUM SERPL-SCNC: 4.8 MMOL/L (ref 3.4–5.1)
RBC # BLD: 4.77 M/UL (ref 3.8–5.8)
SODIUM BLD-SCNC: 142 MMOL/L (ref 136–145)
TOTAL IRON BINDING CAPACITY: 271 UG/DL (ref 250–450)
TOTAL PROTEIN: 7.6 G/DL (ref 6.4–8.3)
TSH REFLEX: 3.62 UIU/ML (ref 0.35–5.5)
WBC # BLD: 9.7 K/UL (ref 4–11)

## 2018-10-28 PROBLEM — L03.119 CELLULITIS OF LOWER EXTREMITY: Status: ACTIVE | Noted: 2018-10-02

## 2018-10-30 RX ORDER — AMLODIPINE BESYLATE 5 MG/1
TABLET ORAL
Qty: 30 TABLET | Refills: 5 | Status: ON HOLD | OUTPATIENT
Start: 2018-10-30 | End: 2019-02-05

## 2018-10-30 RX ORDER — LINAGLIPTIN 5 MG/1
TABLET, FILM COATED ORAL
Qty: 30 TABLET | Refills: 5 | Status: SHIPPED | OUTPATIENT
Start: 2018-10-30 | End: 2019-03-06

## 2018-11-16 RX ORDER — MECLIZINE HYDROCHLORIDE 25 MG/1
TABLET ORAL
Qty: 30 TABLET | Refills: 5 | Status: SHIPPED | OUTPATIENT
Start: 2018-11-16 | End: 2019-01-12 | Stop reason: SDUPTHER

## 2018-11-26 RX ORDER — LOPERAMIDE HYDROCHLORIDE 2 MG/1
CAPSULE ORAL
Qty: 30 CAPSULE | Refills: 5 | Status: ON HOLD | OUTPATIENT
Start: 2018-11-26 | End: 2019-02-05

## 2018-11-27 DIAGNOSIS — I10 ESSENTIAL HYPERTENSION: ICD-10-CM

## 2018-11-27 RX ORDER — ALLOPURINOL 300 MG/1
TABLET ORAL
Qty: 30 TABLET | Refills: 5 | Status: SHIPPED | OUTPATIENT
Start: 2018-11-27 | End: 2019-03-06

## 2018-11-27 RX ORDER — ESOMEPRAZOLE MAGNESIUM 40 MG/1
CAPSULE, DELAYED RELEASE ORAL
Qty: 30 CAPSULE | Refills: 5 | Status: SHIPPED | OUTPATIENT
Start: 2018-11-27 | End: 2019-03-06

## 2018-11-27 RX ORDER — SIMVASTATIN 20 MG
TABLET ORAL
Qty: 30 TABLET | Refills: 5 | Status: ON HOLD | OUTPATIENT
Start: 2018-11-27 | End: 2020-04-27 | Stop reason: HOSPADM

## 2018-12-17 RX ORDER — SULFAMETHOXAZOLE AND TRIMETHOPRIM 800; 160 MG/1; MG/1
1 TABLET ORAL 2 TIMES DAILY
Qty: 20 TABLET | Refills: 0 | Status: SHIPPED | OUTPATIENT
Start: 2018-12-17 | End: 2018-12-27

## 2018-12-18 RX ORDER — FLUCONAZOLE 100 MG/1
100 TABLET ORAL DAILY
Qty: 10 TABLET | Refills: 0 | Status: SHIPPED | OUTPATIENT
Start: 2018-12-18 | End: 2018-12-23

## 2018-12-18 RX ORDER — NYSTATIN 100000 U/G
CREAM TOPICAL
Qty: 60 G | Refills: 5 | Status: ON HOLD | OUTPATIENT
Start: 2018-12-18 | End: 2019-02-05

## 2018-12-21 ENCOUNTER — HOSPITAL ENCOUNTER (EMERGENCY)
Facility: HOSPITAL | Age: 58
Discharge: HOME | End: 2018-12-21
Attending: HOSPITALIST
Payer: MEDICARE

## 2018-12-21 ENCOUNTER — OFFICE VISIT (OUTPATIENT)
Dept: PRIMARY CARE CLINIC | Age: 58
End: 2018-12-21
Payer: MEDICARE

## 2018-12-21 ENCOUNTER — APPOINTMENT (OUTPATIENT)
Dept: CT IMAGING | Facility: HOSPITAL | Age: 58
End: 2018-12-21
Payer: MEDICARE

## 2018-12-21 VITALS
RESPIRATION RATE: 18 BRPM | SYSTOLIC BLOOD PRESSURE: 156 MMHG | BODY MASS INDEX: 50.02 KG/M2 | HEIGHT: 64 IN | DIASTOLIC BLOOD PRESSURE: 68 MMHG | HEART RATE: 77 BPM | WEIGHT: 293 LBS | TEMPERATURE: 97.5 F

## 2018-12-21 VITALS
SYSTOLIC BLOOD PRESSURE: 146 MMHG | TEMPERATURE: 97 F | OXYGEN SATURATION: 95 % | HEART RATE: 80 BPM | DIASTOLIC BLOOD PRESSURE: 71 MMHG

## 2018-12-21 DIAGNOSIS — R59.1 LYMPHADENOPATHY: ICD-10-CM

## 2018-12-21 DIAGNOSIS — R10.30 LOWER ABDOMINAL PAIN: Primary | ICD-10-CM

## 2018-12-21 DIAGNOSIS — M79.3 PANNICULITIS: Primary | ICD-10-CM

## 2018-12-21 DIAGNOSIS — R10.30 LOWER ABDOMINAL PAIN: ICD-10-CM

## 2018-12-21 LAB
A/G RATIO: 0.9 (ref 0.8–2)
ALBUMIN SERPL-MCNC: 3.3 G/DL (ref 3.4–4.8)
ALP BLD-CCNC: 123 U/L (ref 25–100)
ALT SERPL-CCNC: 10 U/L (ref 4–36)
AMORPHOUS: ABNORMAL /HPF
ANION GAP SERPL CALCULATED.3IONS-SCNC: 12 MMOL/L (ref 3–16)
AST SERPL-CCNC: 13 U/L (ref 8–33)
BACTERIA: ABNORMAL /HPF
BASOPHILS ABSOLUTE: 0 K/UL (ref 0–0.1)
BASOPHILS RELATIVE PERCENT: 0.2 %
BILIRUB SERPL-MCNC: 0.3 MG/DL (ref 0.3–1.2)
BILIRUBIN URINE: NEGATIVE
BLOOD, URINE: ABNORMAL
BUN BLDV-MCNC: 26 MG/DL (ref 6–20)
CALCIUM SERPL-MCNC: 8.7 MG/DL (ref 8.5–10.5)
CHLORIDE BLD-SCNC: 102 MMOL/L (ref 98–107)
CLARITY: ABNORMAL
CO2: 24 MMOL/L (ref 20–30)
COLOR: YELLOW
CREAT SERPL-MCNC: 2 MG/DL (ref 0.4–1.2)
EOSINOPHILS ABSOLUTE: 0.2 K/UL (ref 0–0.4)
EOSINOPHILS RELATIVE PERCENT: 1.9 %
EPITHELIAL CELLS, UA: ABNORMAL /HPF
GFR AFRICAN AMERICAN: 31
GFR NON-AFRICAN AMERICAN: 25
GLOBULIN: 3.8 G/DL
GLUCOSE BLD-MCNC: 149 MG/DL (ref 74–106)
GLUCOSE URINE: NEGATIVE MG/DL
HCT VFR BLD CALC: 27.5 % (ref 37–47)
HEMOGLOBIN: 8 G/DL (ref 11.5–16.5)
IMMATURE GRANULOCYTES #: 0.2 K/UL
IMMATURE GRANULOCYTES %: 2.1 % (ref 0–5)
KETONES, URINE: NEGATIVE MG/DL
LACTIC ACID: 1.8 MMOL/L (ref 0.4–2)
LEUKOCYTE ESTERASE, URINE: NEGATIVE
LIPASE: 11 U/L (ref 5.6–51.3)
LYMPHOCYTES ABSOLUTE: 1.2 K/UL (ref 1.5–4)
LYMPHOCYTES RELATIVE PERCENT: 10.6 %
MCH RBC QN AUTO: 24.3 PG (ref 27–32)
MCHC RBC AUTO-ENTMCNC: 29.1 G/DL (ref 31–35)
MCV RBC AUTO: 83.6 FL (ref 80–100)
MICROSCOPIC EXAMINATION: YES
MONOCYTES ABSOLUTE: 0.7 K/UL (ref 0.2–0.8)
MONOCYTES RELATIVE PERCENT: 6 %
NEUTROPHILS ABSOLUTE: 8.7 K/UL (ref 2–7.5)
NEUTROPHILS RELATIVE PERCENT: 79.2 %
NITRITE, URINE: NEGATIVE
PDW BLD-RTO: 19.9 % (ref 11–16)
PH UA: 5.5
PLATELET # BLD: 309 K/UL (ref 150–400)
PMV BLD AUTO: 9.7 FL (ref 6–10)
POTASSIUM REFLEX MAGNESIUM: 4.3 MMOL/L (ref 3.4–5.1)
PROTEIN UA: >=300 MG/DL
RBC # BLD: 3.29 M/UL (ref 3.8–5.8)
RBC UA: ABNORMAL /HPF (ref 0–2)
SODIUM BLD-SCNC: 138 MMOL/L (ref 136–145)
SPECIFIC GRAVITY UA: 1.02
TOTAL PROTEIN: 7.1 G/DL (ref 6.4–8.3)
URINE REFLEX TO CULTURE: ABNORMAL
URINE TYPE: ABNORMAL
UROBILINOGEN, URINE: 0.2 E.U./DL
WBC # BLD: 10.9 K/UL (ref 4–11)
WBC UA: ABNORMAL /HPF (ref 0–5)

## 2018-12-21 PROCEDURE — 99213 OFFICE O/P EST LOW 20 MIN: CPT | Performed by: NURSE PRACTITIONER

## 2018-12-21 PROCEDURE — G8427 DOCREV CUR MEDS BY ELIG CLIN: HCPCS | Performed by: NURSE PRACTITIONER

## 2018-12-21 PROCEDURE — 2580000003 HC RX 258: Performed by: HOSPITALIST

## 2018-12-21 PROCEDURE — 96374 THER/PROPH/DIAG INJ IV PUSH: CPT

## 2018-12-21 PROCEDURE — 1036F TOBACCO NON-USER: CPT | Performed by: NURSE PRACTITIONER

## 2018-12-21 PROCEDURE — G8417 CALC BMI ABV UP PARAM F/U: HCPCS | Performed by: NURSE PRACTITIONER

## 2018-12-21 PROCEDURE — 36415 COLL VENOUS BLD VENIPUNCTURE: CPT

## 2018-12-21 PROCEDURE — 74176 CT ABD & PELVIS W/O CONTRAST: CPT

## 2018-12-21 PROCEDURE — 80053 COMPREHEN METABOLIC PANEL: CPT

## 2018-12-21 PROCEDURE — 83605 ASSAY OF LACTIC ACID: CPT

## 2018-12-21 PROCEDURE — G8482 FLU IMMUNIZE ORDER/ADMIN: HCPCS | Performed by: NURSE PRACTITIONER

## 2018-12-21 PROCEDURE — 6360000002 HC RX W HCPCS: Performed by: HOSPITALIST

## 2018-12-21 PROCEDURE — 83690 ASSAY OF LIPASE: CPT

## 2018-12-21 PROCEDURE — 81001 URINALYSIS AUTO W/SCOPE: CPT

## 2018-12-21 PROCEDURE — 85025 COMPLETE CBC W/AUTO DIFF WBC: CPT

## 2018-12-21 PROCEDURE — 3017F COLORECTAL CA SCREEN DOC REV: CPT | Performed by: NURSE PRACTITIONER

## 2018-12-21 PROCEDURE — 99284 EMERGENCY DEPT VISIT MOD MDM: CPT

## 2018-12-21 RX ORDER — 0.9 % SODIUM CHLORIDE 0.9 %
1000 INTRAVENOUS SOLUTION INTRAVENOUS ONCE
Status: COMPLETED | OUTPATIENT
Start: 2018-12-21 | End: 2018-12-21

## 2018-12-21 RX ORDER — ONDANSETRON 2 MG/ML
4 INJECTION INTRAMUSCULAR; INTRAVENOUS EVERY 30 MIN PRN
Status: DISCONTINUED | OUTPATIENT
Start: 2018-12-21 | End: 2018-12-21 | Stop reason: HOSPADM

## 2018-12-21 RX ORDER — CEPHALEXIN 500 MG/1
500 CAPSULE ORAL 4 TIMES DAILY
Qty: 40 CAPSULE | Refills: 0 | Status: SHIPPED | OUTPATIENT
Start: 2018-12-21 | End: 2018-12-31

## 2018-12-21 RX ADMIN — ONDANSETRON 4 MG: 2 INJECTION INTRAMUSCULAR; INTRAVENOUS at 13:50

## 2018-12-21 RX ADMIN — SODIUM CHLORIDE 1000 ML: 9 INJECTION, SOLUTION INTRAVENOUS at 13:50

## 2018-12-21 ASSESSMENT — ENCOUNTER SYMPTOMS
NAUSEA: 0
BACK PAIN: 0
SHORTNESS OF BREATH: 0
DIARRHEA: 0
VOMITING: 0
ABDOMINAL DISTENTION: 1
COUGH: 0
WHEEZING: 0
SORE THROAT: 0
EYE PAIN: 0
ABDOMINAL PAIN: 1
CONSTIPATION: 0
COLOR CHANGE: 1

## 2018-12-21 ASSESSMENT — PAIN SCALES - GENERAL: PAINLEVEL_OUTOF10: 1

## 2018-12-21 NOTE — ED NOTES
Placed call to [facility] MD's for them to get in touch with [facility] to speak with Dr. Toña Perla concerning patient for a follow-up.      Judith Phillips  12/21/18 [id number]

## 2019-01-02 RX ORDER — GLUCOSAMINE/CHONDR SU A SOD 750-600 MG
2000 TABLET ORAL DAILY
Refills: 0 | Status: ON HOLD | COMMUNITY
Start: 2018-10-03 | End: 2019-04-29

## 2019-01-02 RX ORDER — ACETAMINOPHEN/DIPHENHYDRAMINE 500MG-25MG
81 TABLET ORAL DAILY
Refills: 0 | Status: ON HOLD | COMMUNITY
Start: 2018-10-03 | End: 2019-04-30

## 2019-01-02 RX ORDER — FUROSEMIDE 20 MG/1
20 TABLET ORAL 2 TIMES DAILY
COMMUNITY
Start: 2018-10-04 | End: 2019-01-22 | Stop reason: HOSPADM

## 2019-01-02 RX ORDER — LINAGLIPTIN 5 MG/1
5 TABLET, FILM COATED ORAL DAILY
Status: ON HOLD | COMMUNITY
Start: 2018-10-04 | End: 2019-04-29

## 2019-01-02 RX ORDER — ESOMEPRAZOLE MAGNESIUM 40 MG/1
40 CAPSULE, DELAYED RELEASE ORAL
Refills: 0 | Status: ON HOLD | COMMUNITY
Start: 2018-10-03 | End: 2019-04-29

## 2019-01-02 RX ORDER — AMLODIPINE BESYLATE 5 MG/1
5 TABLET ORAL DAILY
Refills: 0 | COMMUNITY
Start: 2018-10-03 | End: 2019-01-22 | Stop reason: HOSPADM

## 2019-01-02 RX ORDER — SIMVASTATIN 20 MG
20 TABLET ORAL NIGHTLY
Refills: 0 | COMMUNITY
Start: 2018-10-03 | End: 2022-04-14 | Stop reason: HOSPADM

## 2019-01-02 RX ORDER — ALLOPURINOL 300 MG/1
300 TABLET ORAL DAILY
Status: ON HOLD | COMMUNITY
Start: 2018-10-04 | End: 2019-04-29

## 2019-01-03 ENCOUNTER — OFFICE VISIT (OUTPATIENT)
Dept: SURGERY | Facility: CLINIC | Age: 59
End: 2019-01-03

## 2019-01-03 ENCOUNTER — TELEPHONE (OUTPATIENT)
Dept: PRIMARY CARE CLINIC | Age: 59
End: 2019-01-03

## 2019-01-03 VITALS
DIASTOLIC BLOOD PRESSURE: 85 MMHG | OXYGEN SATURATION: 100 % | HEART RATE: 85 BPM | BODY MASS INDEX: 47.09 KG/M2 | HEIGHT: 66 IN | SYSTOLIC BLOOD PRESSURE: 142 MMHG | WEIGHT: 293 LBS | TEMPERATURE: 98.6 F

## 2019-01-03 DIAGNOSIS — R91.8 LUNG MASS: Primary | ICD-10-CM

## 2019-01-03 DIAGNOSIS — R59.9 ENLARGED LYMPH NODES: ICD-10-CM

## 2019-01-03 PROCEDURE — 99203 OFFICE O/P NEW LOW 30 MIN: CPT | Performed by: SURGERY

## 2019-01-03 NOTE — PROGRESS NOTES
Patient: Millicent Mcekon    YOB: 1960    Date: 01/03/2019    Primary Care Provider: Rosa Zamora MD    Chief Complaint   Patient presents with   • Mass       Subjective .     History of present illness:  Patient is in there office today for evaluation and consultation for mass on lymph node.  Patient has multiple medical problems, significant lymphadenopathy in the lung consistent with possible metastatic disease, also cannot rule out pulmonary emboli.  Patient also has morbid obesity with a large panniculus and edema in the lower extremities.  No history of known cancer.  No night sweats or fever.    The following portions of the patient's history were reviewed and updated as appropriate: allergies, current medications, past family history, past medical history, past social history, past surgical history and problem list.       Review of Systems   Respiratory: Positive for apnea and shortness of breath.    Musculoskeletal: Positive for gait problem.   Skin: Positive for color change and pallor.   Hematological: Positive for adenopathy.       Allergies:  Allergies   Allergen Reactions   • Metformin And Related Swelling     HA, diarrhea, throat swelling       Medications:    Current Outpatient Medications:   •  allopurinol (ZYLOPRIM) 300 MG tablet, , Disp: , Rfl:   •  amLODIPine (NORVASC) 5 MG tablet, , Disp: , Rfl: 0  •  esomeprazole (nexIUM) 40 MG capsule, , Disp: , Rfl: 0  •  furosemide (LASIX) 20 MG tablet, , Disp: , Rfl:   •  RA ASPIRIN EC 81 MG EC tablet, , Disp: , Rfl: 0  •  RA VITAMIN C 500 MG tablet, , Disp: , Rfl: 0  •  RA VITAMIN D-3 2000 units capsule, , Disp: , Rfl: 0  •  simvastatin (ZOCOR) 20 MG tablet, , Disp: , Rfl: 0  •  TRADJENTA 5 MG tablet tablet, , Disp: , Rfl:     History:  Past Medical History:   Diagnosis Date   • Diabetes mellitus (CMS/HCC)    • Disease of thyroid gland    • GERD (gastroesophageal reflux disease)    • Hypertension        Past Surgical History:   Procedure  "Laterality Date   • EYE SURGERY     • LEG SURGERY         Family History   Problem Relation Age of Onset   • Asthma Mother    • Hypertension Father    • Stroke Father        Social History     Tobacco Use   • Smoking status: Never Smoker   • Smokeless tobacco: Never Used   Substance Use Topics   • Alcohol use: No     Frequency: Never   • Drug use: No        Objective     Vital Signs:   Vitals:    01/03/19 1102   BP: 142/85   Pulse: 85   Temp: 98.6 °F (37 °C)   SpO2: 100%   Weight: (!) 139 kg (306 lb)   Height: 167.6 cm (66\")       Physical Exam:   General Appearance:    Alert, cooperative, in no acute distress   Head:    Normocephalic, without obvious abnormality, atraumatic   Eyes:            Lids and lashes normal, conjunctivae and sclerae normal, no   icterus, no pallor, corneas clear, PERRLA   Ears:    Ears appear intact with no abnormalities noted   Throat:   No oral lesions, no thrush, oral mucosa moist   Neck:   No adenopathy, supple, trachea midline, no thyromegaly, no   carotid bruit, no JVD   Lungs:     Clear to auscultation,respirations regular, even and                  unlabored    Heart:    Regular rhythm and normal rate, normal S1 and S2, no            murmur, no gallop, no rub, no click   Chest Wall:    No abnormalities observed   Abdomen:     Normal bowel sounds, no masses, no organomegaly, soft        non-tender, non-distended, no guarding, no rebound                Tenderness.  Large panniculus with some edema.  No open wounds.     Extremities:   Moves all extremities well, no edema, no cyanosis, no             Redness.  Severe edema in both lower extremities, nonpitting.  No open wounds or weeping areas.     Pulses:   Pulses palpable and equal bilaterally   Skin:   No bleeding, bruising or rash   Lymph nodes:   No palpable adenopathy   Neurologic:   Cranial nerves 2 - 12 grossly intact, sensation intact, DTR       present and equal bilaterally     Results Review:   I reviewed the patient's new " clinical results.    Assessment/Plan     1. Lung mass    2. Enlarged lymph nodes        Patient very complex, has multiple medical problems.  Most concerning is the possibility of pulmonary emboli and significant pulmonary lymphadenopathy.  Would recommend patient be seen by oncology or possible pulmonology [facility] addressed all her medical issues and problem.  She will need a workup for metastatic cancer.  Patient 2, [facility] small hospital facility.  Family understand and we will set up appropriate follow-up with [facility].    I discussed the patients findings and my recommendations with patient    Review of Systems was reviewed and confirmed as accurate today.    Electronically signed by Geremias Shepherd MD  01/03/19      .

## 2019-01-07 ENCOUNTER — HOSPITAL ENCOUNTER (EMERGENCY)
Facility: HOSPITAL | Age: 59
Discharge: HOME | End: 2019-01-07
Attending: HOSPITALIST
Payer: MEDICARE

## 2019-01-07 ENCOUNTER — TELEPHONE (OUTPATIENT)
Dept: PRIMARY CARE CLINIC | Age: 59
End: 2019-01-07

## 2019-01-07 ENCOUNTER — APPOINTMENT (OUTPATIENT)
Dept: GENERAL RADIOLOGY | Facility: HOSPITAL | Age: 59
End: 2019-01-07
Payer: MEDICARE

## 2019-01-07 VITALS
BODY MASS INDEX: 50.02 KG/M2 | DIASTOLIC BLOOD PRESSURE: 48 MMHG | HEART RATE: 52 BPM | HEIGHT: 64 IN | WEIGHT: 293 LBS | RESPIRATION RATE: 18 BRPM | TEMPERATURE: 97.5 F | SYSTOLIC BLOOD PRESSURE: 96 MMHG | OXYGEN SATURATION: 95 %

## 2019-01-07 DIAGNOSIS — S91.309A OPEN WOUND OF FOOT EXCLUDING TOES: Primary | ICD-10-CM

## 2019-01-07 LAB
A/G RATIO: 0.9 (ref 0.8–2)
ALBUMIN SERPL-MCNC: 3 G/DL (ref 3.4–4.8)
ALP BLD-CCNC: 240 U/L (ref 25–100)
ALT SERPL-CCNC: 24 U/L (ref 4–36)
ANION GAP SERPL CALCULATED.3IONS-SCNC: 11 MMOL/L (ref 3–16)
AST SERPL-CCNC: 29 U/L (ref 8–33)
BASOPHILS ABSOLUTE: 0 K/UL (ref 0–0.1)
BASOPHILS RELATIVE PERCENT: 0.1 %
BILIRUB SERPL-MCNC: 0.4 MG/DL (ref 0.3–1.2)
BUN BLDV-MCNC: 34 MG/DL (ref 6–20)
CALCIUM SERPL-MCNC: 8.8 MG/DL (ref 8.5–10.5)
CHLORIDE BLD-SCNC: 102 MMOL/L (ref 98–107)
CO2: 20 MMOL/L (ref 20–30)
CREAT SERPL-MCNC: 2.2 MG/DL (ref 0.4–1.2)
EOSINOPHILS ABSOLUTE: 0 K/UL (ref 0–0.4)
EOSINOPHILS RELATIVE PERCENT: 0.3 %
GFR AFRICAN AMERICAN: 28
GFR NON-AFRICAN AMERICAN: 23
GLOBULIN: 3.4 G/DL
GLUCOSE BLD-MCNC: 143 MG/DL (ref 74–106)
HCT VFR BLD CALC: 26.2 % (ref 37–47)
HEMOGLOBIN: 7.6 G/DL (ref 11.5–16.5)
IMMATURE GRANULOCYTES #: 0 K/UL
IMMATURE GRANULOCYTES %: 0.1 % (ref 0–5)
LYMPHOCYTES ABSOLUTE: 0.5 K/UL (ref 1.5–4)
LYMPHOCYTES RELATIVE PERCENT: 7.3 %
MCH RBC QN AUTO: 24.8 PG (ref 27–32)
MCHC RBC AUTO-ENTMCNC: 29 G/DL (ref 31–35)
MCV RBC AUTO: 85.6 FL (ref 80–100)
MONOCYTES ABSOLUTE: 0.3 K/UL (ref 0.2–0.8)
MONOCYTES RELATIVE PERCENT: 3.9 %
NEUTROPHILS ABSOLUTE: 6.1 K/UL (ref 2–7.5)
NEUTROPHILS RELATIVE PERCENT: 88.3 %
PDW BLD-RTO: 21.2 % (ref 11–16)
PLATELET # BLD: 175 K/UL (ref 150–400)
PMV BLD AUTO: 10.5 FL (ref 6–10)
POTASSIUM REFLEX MAGNESIUM: 5.4 MMOL/L (ref 3.4–5.1)
RBC # BLD: 3.06 M/UL (ref 3.8–5.8)
SODIUM BLD-SCNC: 133 MMOL/L (ref 136–145)
TOTAL PROTEIN: 6.4 G/DL (ref 6.4–8.3)
TROPONIN: <0.3 NG/ML
WBC # BLD: 6.9 K/UL (ref 4–11)

## 2019-01-07 PROCEDURE — 71045 X-RAY EXAM CHEST 1 VIEW: CPT

## 2019-01-07 PROCEDURE — 93005 ELECTROCARDIOGRAM TRACING: CPT

## 2019-01-07 PROCEDURE — 84484 ASSAY OF TROPONIN QUANT: CPT

## 2019-01-07 PROCEDURE — 80053 COMPREHEN METABOLIC PANEL: CPT

## 2019-01-07 PROCEDURE — 99285 EMERGENCY DEPT VISIT HI MDM: CPT

## 2019-01-07 PROCEDURE — 85025 COMPLETE CBC W/AUTO DIFF WBC: CPT

## 2019-01-07 PROCEDURE — 36415 COLL VENOUS BLD VENIPUNCTURE: CPT

## 2019-01-07 RX ORDER — CEPHALEXIN 500 MG/1
500 CAPSULE ORAL 2 TIMES DAILY
Qty: 20 CAPSULE | Refills: 0 | Status: SHIPPED | OUTPATIENT
Start: 2019-01-07 | End: 2019-01-17

## 2019-01-07 RX ORDER — SULFAMETHOXAZOLE AND TRIMETHOPRIM 800; 160 MG/1; MG/1
1 TABLET ORAL 2 TIMES DAILY
Qty: 20 TABLET | Refills: 0 | Status: SHIPPED | OUTPATIENT
Start: 2019-01-07 | End: 2019-01-17

## 2019-01-08 ENCOUNTER — HOSPITAL ENCOUNTER (INPATIENT)
Facility: HOSPITAL | Age: 59
LOS: 2 days | Discharge: ANOTHER HOSPITAL | DRG: 189 | End: 2019-01-10
Attending: EMERGENCY MEDICINE | Admitting: INTERNAL MEDICINE
Payer: MEDICARE

## 2019-01-08 ENCOUNTER — APPOINTMENT (OUTPATIENT)
Dept: GENERAL RADIOLOGY | Facility: HOSPITAL | Age: 59
DRG: 189 | End: 2019-01-08
Payer: MEDICARE

## 2019-01-08 DIAGNOSIS — L03.119 CELLULITIS OF LOWER EXTREMITY, UNSPECIFIED LATERALITY: ICD-10-CM

## 2019-01-08 DIAGNOSIS — I50.814 RIGHT-SIDED CONGESTIVE HEART FAILURE SECONDARY TO LEFT-SIDED CONGESTIVE HEART FAILURE (HCC): Primary | ICD-10-CM

## 2019-01-08 PROBLEM — J81.0 ACUTE PULMONARY EDEMA (HCC): Status: ACTIVE | Noted: 2019-01-08

## 2019-01-08 LAB
A/G RATIO: 0.8 (ref 0.8–2)
ABO/RH: NORMAL
ALBUMIN SERPL-MCNC: 3.1 G/DL (ref 3.4–4.8)
ALP BLD-CCNC: 244 U/L (ref 25–100)
ALT SERPL-CCNC: 27 U/L (ref 4–36)
ANION GAP SERPL CALCULATED.3IONS-SCNC: 13 MMOL/L (ref 3–16)
ANTIBODY SCREEN: NORMAL
AST SERPL-CCNC: 29 U/L (ref 8–33)
BASOPHILS ABSOLUTE: 0 K/UL (ref 0–0.1)
BASOPHILS RELATIVE PERCENT: 0.3 %
BILIRUB SERPL-MCNC: 0.3 MG/DL (ref 0.3–1.2)
BUN BLDV-MCNC: 39 MG/DL (ref 6–20)
CALCIUM SERPL-MCNC: 9.3 MG/DL (ref 8.5–10.5)
CHLORIDE BLD-SCNC: 101 MMOL/L (ref 98–107)
CO2: 21 MMOL/L (ref 20–30)
CREAT SERPL-MCNC: 2.4 MG/DL (ref 0.4–1.2)
EOSINOPHILS ABSOLUTE: 0 K/UL (ref 0–0.4)
EOSINOPHILS RELATIVE PERCENT: 0.3 %
GFR AFRICAN AMERICAN: 25
GFR NON-AFRICAN AMERICAN: 21
GLOBULIN: 3.9 G/DL
GLUCOSE BLD-MCNC: 141 MG/DL (ref 74–106)
GLUCOSE BLD-MCNC: 153 MG/DL (ref 74–106)
HCT VFR BLD CALC: 27.3 % (ref 37–47)
HEMOGLOBIN: 8 G/DL (ref 11.5–16.5)
IMMATURE GRANULOCYTES #: 0.1 K/UL
IMMATURE GRANULOCYTES %: 1.5 % (ref 0–5)
IRON SATURATION: 6 % (ref 15–50)
IRON: 10 UG/DL (ref 37–145)
LYMPHOCYTES ABSOLUTE: 0.5 K/UL (ref 1.5–4)
LYMPHOCYTES RELATIVE PERCENT: 4.9 %
MCH RBC QN AUTO: 24.5 PG (ref 27–32)
MCHC RBC AUTO-ENTMCNC: 29.3 G/DL (ref 31–35)
MCV RBC AUTO: 83.5 FL (ref 80–100)
MONOCYTES ABSOLUTE: 0.2 K/UL (ref 0.2–0.8)
MONOCYTES RELATIVE PERCENT: 2.2 %
NEUTROPHILS ABSOLUTE: 8.3 K/UL (ref 2–7.5)
NEUTROPHILS RELATIVE PERCENT: 90.8 %
PDW BLD-RTO: 21.2 % (ref 11–16)
PERFORMED ON: ABNORMAL
PLATELET # BLD: 152 K/UL (ref 150–400)
PMV BLD AUTO: 9.9 FL (ref 6–10)
POTASSIUM SERPL-SCNC: 5.3 MMOL/L (ref 3.4–5.1)
PRO-BNP: 5711 PG/ML (ref 0–1800)
RBC # BLD: 3.27 M/UL (ref 3.8–5.8)
SODIUM BLD-SCNC: 135 MMOL/L (ref 136–145)
TOTAL IRON BINDING CAPACITY: 178 UG/DL (ref 250–450)
TOTAL PROTEIN: 7 G/DL (ref 6.4–8.3)
TROPONIN: <0.3 NG/ML
WBC # BLD: 9.2 K/UL (ref 4–11)

## 2019-01-08 PROCEDURE — 6360000002 HC RX W HCPCS: Performed by: NURSE PRACTITIONER

## 2019-01-08 PROCEDURE — 2580000003 HC RX 258: Performed by: NURSE PRACTITIONER

## 2019-01-08 PROCEDURE — 6370000000 HC RX 637 (ALT 250 FOR IP): Performed by: NURSE PRACTITIONER

## 2019-01-08 PROCEDURE — 84484 ASSAY OF TROPONIN QUANT: CPT

## 2019-01-08 PROCEDURE — 1200000000 HC SEMI PRIVATE

## 2019-01-08 PROCEDURE — 99285 EMERGENCY DEPT VISIT HI MDM: CPT

## 2019-01-08 PROCEDURE — 93005 ELECTROCARDIOGRAM TRACING: CPT

## 2019-01-08 PROCEDURE — 85025 COMPLETE CBC W/AUTO DIFF WBC: CPT

## 2019-01-08 PROCEDURE — 86920 COMPATIBILITY TEST SPIN: CPT

## 2019-01-08 PROCEDURE — 83550 IRON BINDING TEST: CPT

## 2019-01-08 PROCEDURE — 71045 X-RAY EXAM CHEST 1 VIEW: CPT

## 2019-01-08 PROCEDURE — 86850 RBC ANTIBODY SCREEN: CPT

## 2019-01-08 PROCEDURE — P9016 RBC LEUKOCYTES REDUCED: HCPCS

## 2019-01-08 PROCEDURE — 80053 COMPREHEN METABOLIC PANEL: CPT

## 2019-01-08 PROCEDURE — 36415 COLL VENOUS BLD VENIPUNCTURE: CPT

## 2019-01-08 PROCEDURE — 86901 BLOOD TYPING SEROLOGIC RH(D): CPT

## 2019-01-08 PROCEDURE — 83540 ASSAY OF IRON: CPT

## 2019-01-08 PROCEDURE — 86900 BLOOD TYPING SEROLOGIC ABO: CPT

## 2019-01-08 PROCEDURE — 83880 ASSAY OF NATRIURETIC PEPTIDE: CPT

## 2019-01-08 RX ORDER — GLIPIZIDE 5 MG/1
5 TABLET ORAL
Status: DISCONTINUED | OUTPATIENT
Start: 2019-01-09 | End: 2019-01-09

## 2019-01-08 RX ORDER — ONDANSETRON 2 MG/ML
4 INJECTION INTRAMUSCULAR; INTRAVENOUS EVERY 6 HOURS PRN
Status: DISCONTINUED | OUTPATIENT
Start: 2019-01-08 | End: 2019-01-10 | Stop reason: HOSPADM

## 2019-01-08 RX ORDER — LEVOTHYROXINE SODIUM 0.05 MG/1
50 TABLET ORAL DAILY
Status: DISCONTINUED | OUTPATIENT
Start: 2019-01-09 | End: 2019-01-10 | Stop reason: HOSPADM

## 2019-01-08 RX ORDER — SIMVASTATIN 20 MG
20 TABLET ORAL NIGHTLY
Status: DISCONTINUED | OUTPATIENT
Start: 2019-01-08 | End: 2019-01-09

## 2019-01-08 RX ORDER — INSULIN GLARGINE 100 [IU]/ML
30 INJECTION, SOLUTION SUBCUTANEOUS NIGHTLY
Status: DISCONTINUED | OUTPATIENT
Start: 2019-01-08 | End: 2019-01-09

## 2019-01-08 RX ORDER — FERROUS SULFATE TAB EC 324 MG (65 MG FE EQUIVALENT) 324 (65 FE) MG
324 TABLET DELAYED RESPONSE ORAL 2 TIMES DAILY WITH MEALS
Status: DISCONTINUED | OUTPATIENT
Start: 2019-01-09 | End: 2019-01-10 | Stop reason: HOSPADM

## 2019-01-08 RX ORDER — ASCORBIC ACID 500 MG
500 TABLET ORAL DAILY
Status: DISCONTINUED | OUTPATIENT
Start: 2019-01-09 | End: 2019-01-10 | Stop reason: HOSPADM

## 2019-01-08 RX ORDER — DEXTROSE MONOHYDRATE 25 G/50ML
12.5 INJECTION, SOLUTION INTRAVENOUS PRN
Status: DISCONTINUED | OUTPATIENT
Start: 2019-01-08 | End: 2019-01-10 | Stop reason: HOSPADM

## 2019-01-08 RX ORDER — ZINC SULFATE 50(220)MG
220 CAPSULE ORAL DAILY
Status: DISCONTINUED | OUTPATIENT
Start: 2019-01-09 | End: 2019-01-10 | Stop reason: HOSPADM

## 2019-01-08 RX ORDER — FUROSEMIDE 10 MG/ML
20 INJECTION INTRAMUSCULAR; INTRAVENOUS ONCE
Status: COMPLETED | OUTPATIENT
Start: 2019-01-08 | End: 2019-01-08

## 2019-01-08 RX ORDER — NICOTINE POLACRILEX 4 MG
15 LOZENGE BUCCAL PRN
Status: DISCONTINUED | OUTPATIENT
Start: 2019-01-08 | End: 2019-01-10 | Stop reason: HOSPADM

## 2019-01-08 RX ORDER — SODIUM CHLORIDE 0.9 % (FLUSH) 0.9 %
10 SYRINGE (ML) INJECTION PRN
Status: DISCONTINUED | OUTPATIENT
Start: 2019-01-08 | End: 2019-01-10 | Stop reason: HOSPADM

## 2019-01-08 RX ORDER — DEXTROSE MONOHYDRATE 50 MG/ML
100 INJECTION, SOLUTION INTRAVENOUS PRN
Status: DISCONTINUED | OUTPATIENT
Start: 2019-01-08 | End: 2019-01-10 | Stop reason: HOSPADM

## 2019-01-08 RX ORDER — SODIUM CHLORIDE 0.9 % (FLUSH) 0.9 %
10 SYRINGE (ML) INJECTION EVERY 12 HOURS SCHEDULED
Status: DISCONTINUED | OUTPATIENT
Start: 2019-01-08 | End: 2019-01-10 | Stop reason: HOSPADM

## 2019-01-08 RX ADMIN — DEXTROSE MONOHYDRATE 1 G: 5 INJECTION INTRAVENOUS at 23:15

## 2019-01-08 RX ADMIN — INSULIN GLARGINE 30 UNITS: 100 INJECTION, SOLUTION SUBCUTANEOUS at 23:16

## 2019-01-08 RX ADMIN — FUROSEMIDE 20 MG: 10 INJECTION, SOLUTION INTRAVENOUS at 23:14

## 2019-01-08 RX ADMIN — Medication 10 ML: at 23:15

## 2019-01-08 RX ADMIN — INSULIN LISPRO 1 UNITS: 100 INJECTION, SOLUTION INTRAVENOUS; SUBCUTANEOUS at 23:16

## 2019-01-08 ASSESSMENT — ENCOUNTER SYMPTOMS
TROUBLE SWALLOWING: 0
BACK PAIN: 0
ABDOMINAL PAIN: 0
CHEST TIGHTNESS: 0
SINUS PRESSURE: 0
WHEEZING: 0
CONSTIPATION: 0
EYE PAIN: 0
EYE REDNESS: 0
NAUSEA: 1
SORE THROAT: 0
SHORTNESS OF BREATH: 1
VOMITING: 1
RHINORRHEA: 0
DIARRHEA: 0
COUGH: 0
EYE DISCHARGE: 0

## 2019-01-09 ENCOUNTER — TELEPHONE (OUTPATIENT)
Dept: PRIMARY CARE CLINIC | Age: 59
End: 2019-01-09

## 2019-01-09 ENCOUNTER — APPOINTMENT (OUTPATIENT)
Dept: CT IMAGING | Facility: HOSPITAL | Age: 59
DRG: 189 | End: 2019-01-09
Payer: MEDICARE

## 2019-01-09 PROBLEM — R41.82 ALTERED MENTAL STATUS: Status: ACTIVE | Noted: 2019-01-09

## 2019-01-09 PROBLEM — N17.9 ACUTE ON CHRONIC RENAL FAILURE (HCC): Status: ACTIVE | Noted: 2019-01-09

## 2019-01-09 PROBLEM — N18.9 ACUTE ON CHRONIC RENAL FAILURE (HCC): Status: ACTIVE | Noted: 2019-01-09

## 2019-01-09 PROBLEM — E87.5 HYPERKALEMIA: Status: ACTIVE | Noted: 2019-01-09

## 2019-01-09 LAB
AMMONIA: 29 MCG/DL (ref 19–87)
AMORPHOUS: ABNORMAL /HPF
AMPHETAMINE SCREEN, URINE: NORMAL
ANION GAP SERPL CALCULATED.3IONS-SCNC: 14 MMOL/L (ref 3–16)
BACTERIA: ABNORMAL /HPF
BARBITURATE SCREEN URINE: NORMAL
BASE EXCESS ARTERIAL: -3.4 MMOL/L (ref -3–3)
BASOPHILS ABSOLUTE: 0.1 K/UL (ref 0–0.1)
BASOPHILS RELATIVE PERCENT: 0.6 %
BENZODIAZEPINE SCREEN, URINE: NORMAL
BILIRUBIN URINE: NEGATIVE
BLOOD BANK DISPENSE STATUS: NORMAL
BLOOD BANK DISPENSE STATUS: NORMAL
BLOOD BANK PRODUCT CODE: NORMAL
BLOOD BANK PRODUCT CODE: NORMAL
BLOOD, URINE: NEGATIVE
BPU ID: NORMAL
BPU ID: NORMAL
BUN BLDV-MCNC: 42 MG/DL (ref 6–20)
CALCIUM SERPL-MCNC: 8.6 MG/DL (ref 8.5–10.5)
CANNABINOID SCREEN URINE: NORMAL
CHLORIDE BLD-SCNC: 103 MMOL/L (ref 98–107)
CLARITY: CLEAR
CO2: 18 MMOL/L (ref 20–30)
COCAINE METABOLITE SCREEN URINE: NORMAL
COLOR: YELLOW
COMPONENT: NORMAL
COMPONENT: NORMAL
CREAT SERPL-MCNC: 2.5 MG/DL (ref 0.4–1.2)
DONOR TYPE/RH: NORMAL
DONOR TYPE/RH: NORMAL
EOSINOPHILS ABSOLUTE: 0 K/UL (ref 0–0.4)
EOSINOPHILS RELATIVE PERCENT: 0.1 %
EPITHELIAL CELLS, UA: ABNORMAL /HPF
FERRITIN: 554.5 NG/ML (ref 22–322)
FIO2: 0.21 %
GFR AFRICAN AMERICAN: 24
GFR NON-AFRICAN AMERICAN: 20
GLUCOSE BLD-MCNC: 103 MG/DL (ref 74–106)
GLUCOSE BLD-MCNC: 129 MG/DL (ref 74–106)
GLUCOSE BLD-MCNC: 131 MG/DL (ref 74–106)
GLUCOSE BLD-MCNC: 67 MG/DL (ref 74–106)
GLUCOSE BLD-MCNC: 79 MG/DL (ref 74–106)
GLUCOSE BLD-MCNC: 82 MG/DL (ref 74–106)
GLUCOSE URINE: NEGATIVE MG/DL
HCO3 ARTERIAL: 21.9 MMOL/L (ref 22–26)
HCT VFR BLD CALC: 26.5 % (ref 37–47)
HEMOGLOBIN: 7.5 G/DL (ref 11.5–16.5)
HOLLISTER NO: NORMAL
HOLLISTER NO: NORMAL
IMMATURE GRANULOCYTES #: 0.2 K/UL
IMMATURE GRANULOCYTES %: 1.9 % (ref 0–5)
KETONES, URINE: ABNORMAL MG/DL
LEUKOCYTE ESTERASE, URINE: NEGATIVE
LYMPHOCYTES ABSOLUTE: 0.8 K/UL (ref 1.5–4)
LYMPHOCYTES RELATIVE PERCENT: 7.9 %
Lab: NORMAL
MCH RBC QN AUTO: 24.4 PG (ref 27–32)
MCHC RBC AUTO-ENTMCNC: 28.3 G/DL (ref 31–35)
MCV RBC AUTO: 86 FL (ref 80–100)
METHADONE SCREEN, URINE: NORMAL
METHAMPHETAMINE, URINE: NORMAL
MICROSCOPIC EXAMINATION: YES
MONOCYTES ABSOLUTE: 0.3 K/UL (ref 0.2–0.8)
MONOCYTES RELATIVE PERCENT: 2.6 %
MUCUS: ABNORMAL /LPF
NEUTROPHILS ABSOLUTE: 8.5 K/UL (ref 2–7.5)
NEUTROPHILS RELATIVE PERCENT: 86.9 %
NITRITE, URINE: NEGATIVE
O2 SAT, ARTERIAL: 93.5 %
O2 THERAPY: ABNORMAL
OPIATE SCREEN URINE: NORMAL
PCO2 ARTERIAL: 40.4 MMHG (ref 35–45)
PDW BLD-RTO: 21.2 % (ref 11–16)
PERFORMED ON: ABNORMAL
PERFORMED ON: ABNORMAL
PERFORMED ON: NORMAL
PH ARTERIAL: 7.35 (ref 7.35–7.45)
PH UA: 5
PHENCYCLIDINE SCREEN URINE: NORMAL
PLATELET # BLD: 126 K/UL (ref 150–400)
PMV BLD AUTO: 11.5 FL (ref 6–10)
PO2 ARTERIAL: 85.1 MMHG (ref 80–100)
POTASSIUM REFLEX MAGNESIUM: 5.6 MMOL/L (ref 3.4–5.1)
PROPOXYPHENE SCREEN, URINE: NORMAL
PROTEIN UA: >=300 MG/DL
RBC # BLD: 3.08 M/UL (ref 3.8–5.8)
RBC UA: ABNORMAL /HPF (ref 0–2)
SODIUM BLD-SCNC: 135 MMOL/L (ref 136–145)
SPECIFIC GRAVITY UA: 1.02
TCO2 ARTERIAL: 23.1 MMOL/L (ref 24–30)
TOTAL CK: 212 U/L (ref 26–174)
TRICYCLIC, URINE: NORMAL
TSH SERPL DL<=0.05 MIU/L-ACNC: 17.16 UIU/ML (ref 0.35–5.5)
UR OXYCODONE RAPID SCREEN: NORMAL
URINE TYPE: ABNORMAL
UROBILINOGEN, URINE: 0.2 E.U./DL
WBC # BLD: 9.8 K/UL (ref 4–11)
WBC UA: ABNORMAL /HPF (ref 0–5)

## 2019-01-09 PROCEDURE — 99223 1ST HOSP IP/OBS HIGH 75: CPT | Performed by: INTERNAL MEDICINE

## 2019-01-09 PROCEDURE — 6370000000 HC RX 637 (ALT 250 FOR IP)

## 2019-01-09 PROCEDURE — 6360000002 HC RX W HCPCS: Performed by: NURSE PRACTITIONER

## 2019-01-09 PROCEDURE — 80048 BASIC METABOLIC PNL TOTAL CA: CPT

## 2019-01-09 PROCEDURE — 2580000003 HC RX 258: Performed by: INTERNAL MEDICINE

## 2019-01-09 PROCEDURE — 84443 ASSAY THYROID STIM HORMONE: CPT

## 2019-01-09 PROCEDURE — 82728 ASSAY OF FERRITIN: CPT

## 2019-01-09 PROCEDURE — 36600 WITHDRAWAL OF ARTERIAL BLOOD: CPT

## 2019-01-09 PROCEDURE — 81001 URINALYSIS AUTO W/SCOPE: CPT

## 2019-01-09 PROCEDURE — 82140 ASSAY OF AMMONIA: CPT

## 2019-01-09 PROCEDURE — 82550 ASSAY OF CK (CPK): CPT

## 2019-01-09 PROCEDURE — 2580000003 HC RX 258: Performed by: NURSE PRACTITIONER

## 2019-01-09 PROCEDURE — 51702 INSERT TEMP BLADDER CATH: CPT

## 2019-01-09 PROCEDURE — 6370000000 HC RX 637 (ALT 250 FOR IP): Performed by: NURSE PRACTITIONER

## 2019-01-09 PROCEDURE — 97802 MEDICAL NUTRITION INDIV IN: CPT

## 2019-01-09 PROCEDURE — 36415 COLL VENOUS BLD VENIPUNCTURE: CPT

## 2019-01-09 PROCEDURE — 6360000002 HC RX W HCPCS: Performed by: INTERNAL MEDICINE

## 2019-01-09 PROCEDURE — 2500000003 HC RX 250 WO HCPCS

## 2019-01-09 PROCEDURE — 82803 BLOOD GASES ANY COMBINATION: CPT

## 2019-01-09 PROCEDURE — 6370000000 HC RX 637 (ALT 250 FOR IP): Performed by: INTERNAL MEDICINE

## 2019-01-09 PROCEDURE — 1200000000 HC SEMI PRIVATE

## 2019-01-09 PROCEDURE — 70450 CT HEAD/BRAIN W/O DYE: CPT

## 2019-01-09 PROCEDURE — 85025 COMPLETE CBC W/AUTO DIFF WBC: CPT

## 2019-01-09 PROCEDURE — 80307 DRUG TEST PRSMV CHEM ANLYZR: CPT

## 2019-01-09 PROCEDURE — 93005 ELECTROCARDIOGRAM TRACING: CPT

## 2019-01-09 RX ORDER — FUROSEMIDE 10 MG/ML
20 INJECTION INTRAMUSCULAR; INTRAVENOUS ONCE
Status: COMPLETED | OUTPATIENT
Start: 2019-01-09 | End: 2019-01-09

## 2019-01-09 RX ORDER — ASPIRIN 81 MG/1
81 TABLET ORAL DAILY
Status: DISCONTINUED | OUTPATIENT
Start: 2019-01-10 | End: 2019-01-10 | Stop reason: HOSPADM

## 2019-01-09 RX ORDER — ZINC OXIDE AND DIMETHICONE 120; 10 MG/G; MG/G
CREAM TOPICAL 2 TIMES DAILY PRN
Status: DISCONTINUED | OUTPATIENT
Start: 2019-01-09 | End: 2019-01-10 | Stop reason: HOSPADM

## 2019-01-09 RX ORDER — ASPIRIN 81 MG/1
325 TABLET, CHEWABLE ORAL ONCE
Status: COMPLETED | OUTPATIENT
Start: 2019-01-09 | End: 2019-01-09

## 2019-01-09 RX ORDER — ZINC OXIDE AND DIMETHICONE 120; 10 MG/G; MG/G
CREAM TOPICAL
Status: COMPLETED
Start: 2019-01-09 | End: 2019-01-09

## 2019-01-09 RX ORDER — SODIUM POLYSTYRENE SULFONATE 15 G/60ML
15 SUSPENSION ORAL; RECTAL ONCE
Status: COMPLETED | OUTPATIENT
Start: 2019-01-09 | End: 2019-01-09

## 2019-01-09 RX ADMIN — ZINC OXIDE AND DIMETHICONE: 120; 10 CREAM TOPICAL at 21:05

## 2019-01-09 RX ADMIN — OXYCODONE HYDROCHLORIDE AND ACETAMINOPHEN 500 MG: 500 TABLET ORAL at 08:20

## 2019-01-09 RX ADMIN — Medication 10 ML: at 08:26

## 2019-01-09 RX ADMIN — GLIPIZIDE 5 MG: 5 TABLET ORAL at 06:07

## 2019-01-09 RX ADMIN — FERROUS SULFATE TAB EC 324 MG (65 MG FE EQUIVALENT) 324 MG: 324 (65 FE) TABLET DELAYED RESPONSE at 08:20

## 2019-01-09 RX ADMIN — FUROSEMIDE 20 MG: 10 INJECTION, SOLUTION INTRAMUSCULAR; INTRAVENOUS at 17:35

## 2019-01-09 RX ADMIN — ZINC SULFATE 220 MG (50 MG) CAPSULE 220 MG: CAPSULE at 08:20

## 2019-01-09 RX ADMIN — LINAGLIPTIN 5 MG: 5 TABLET, FILM COATED ORAL at 08:20

## 2019-01-09 RX ADMIN — MICONAZOLE NITRATE: 2 POWDER TOPICAL at 21:05

## 2019-01-09 RX ADMIN — IRON SUCROSE 200 MG: 20 INJECTION, SOLUTION INTRAVENOUS at 09:26

## 2019-01-09 RX ADMIN — ENOXAPARIN SODIUM 40 MG: 40 INJECTION SUBCUTANEOUS at 08:20

## 2019-01-09 RX ADMIN — DEXTROSE MONOHYDRATE 1 G: 5 INJECTION INTRAVENOUS at 21:16

## 2019-01-09 RX ADMIN — ASPIRIN 81 MG 325 MG: 81 TABLET ORAL at 23:15

## 2019-01-09 RX ADMIN — LEVOTHYROXINE SODIUM 50 MCG: 50 TABLET ORAL at 06:07

## 2019-01-09 RX ADMIN — SODIUM POLYSTYRENE SULFONATE 15 G: 15 SUSPENSION ORAL; RECTAL at 18:37

## 2019-01-09 RX ADMIN — Medication 10 ML: at 20:57

## 2019-01-09 RX ADMIN — FERROUS SULFATE TAB EC 324 MG (65 MG FE EQUIVALENT) 324 MG: 324 (65 FE) TABLET DELAYED RESPONSE at 17:35

## 2019-01-10 ENCOUNTER — HOSPITAL ENCOUNTER (INPATIENT)
Facility: HOSPITAL | Age: 59
LOS: 12 days | Discharge: NURSING, REHAB OR LONG-TERM CARE | End: 2019-01-22
Attending: HOSPITALIST | Admitting: PODIATRIST

## 2019-01-10 ENCOUNTER — APPOINTMENT (OUTPATIENT)
Dept: CT IMAGING | Facility: HOSPITAL | Age: 59
DRG: 189 | End: 2019-01-10
Payer: MEDICARE

## 2019-01-10 VITALS
WEIGHT: 293 LBS | RESPIRATION RATE: 21 BRPM | HEART RATE: 96 BPM | HEIGHT: 64 IN | TEMPERATURE: 98.7 F | OXYGEN SATURATION: 91 % | DIASTOLIC BLOOD PRESSURE: 51 MMHG | BODY MASS INDEX: 50.02 KG/M2 | SYSTOLIC BLOOD PRESSURE: 141 MMHG

## 2019-01-10 DIAGNOSIS — R13.12 DYSPHAGIA, OROPHARYNGEAL PHASE: ICD-10-CM

## 2019-01-10 DIAGNOSIS — L03.116 CELLULITIS OF BOTH LOWER EXTREMITIES: ICD-10-CM

## 2019-01-10 DIAGNOSIS — Z78.9 IMPAIRED MOBILITY AND ADLS: ICD-10-CM

## 2019-01-10 DIAGNOSIS — R41.82 ALTERED MENTAL STATUS, UNSPECIFIED ALTERED MENTAL STATUS TYPE: Primary | ICD-10-CM

## 2019-01-10 DIAGNOSIS — Z74.09 IMPAIRED MOBILITY AND ADLS: ICD-10-CM

## 2019-01-10 DIAGNOSIS — Z74.09 IMPAIRED FUNCTIONAL MOBILITY, BALANCE, GAIT, AND ENDURANCE: ICD-10-CM

## 2019-01-10 DIAGNOSIS — L03.115 CELLULITIS OF BOTH LOWER EXTREMITIES: ICD-10-CM

## 2019-01-10 PROBLEM — E53.8 VITAMIN B12 DEFICIENCY: Status: ACTIVE | Noted: 2019-01-10

## 2019-01-10 PROBLEM — T68.XXXA HYPOTHERMIA: Status: ACTIVE | Noted: 2019-01-10

## 2019-01-10 PROBLEM — E11.8 TYPE 2 DIABETES MELLITUS WITH COMPLICATION: Status: ACTIVE | Noted: 2019-01-10

## 2019-01-10 PROBLEM — E87.5 HYPERKALEMIA: Status: ACTIVE | Noted: 2019-01-09

## 2019-01-10 PROBLEM — N18.9 ACUTE ON CHRONIC RENAL FAILURE: Status: ACTIVE | Noted: 2019-01-09

## 2019-01-10 PROBLEM — R59.1 LYMPHADENOPATHY: Status: ACTIVE | Noted: 2019-01-10

## 2019-01-10 PROBLEM — L03.119 CELLULITIS OF LOWER EXTREMITY: Status: ACTIVE | Noted: 2018-10-02

## 2019-01-10 PROBLEM — N17.9 ACUTE ON CHRONIC RENAL FAILURE (HCC): Status: ACTIVE | Noted: 2019-01-09

## 2019-01-10 PROBLEM — I10 HYPERTENSION: Status: ACTIVE | Noted: 2019-01-10

## 2019-01-10 PROBLEM — R00.1 BRADYCARDIA: Status: ACTIVE | Noted: 2019-01-10

## 2019-01-10 PROBLEM — D64.9 ANEMIA: Status: ACTIVE | Noted: 2018-10-02

## 2019-01-10 PROBLEM — E03.9 HYPOTHYROIDISM: Status: ACTIVE | Noted: 2019-01-10

## 2019-01-10 LAB
ABO GROUP BLD: NORMAL
ABO GROUP BLD: NORMAL
ALBUMIN SERPL-MCNC: 2.9 G/DL (ref 3.5–5)
ALBUMIN/GLOB SERPL: 0.9 G/DL (ref 1–2)
ALP SERPL-CCNC: 276 U/L (ref 38–126)
ALT SERPL W P-5'-P-CCNC: 38 U/L (ref 13–69)
ANION GAP SERPL CALCULATED.3IONS-SCNC: 13 MMOL/L (ref 3–16)
ANION GAP SERPL CALCULATED.3IONS-SCNC: 13.5 MMOL/L (ref 10–20)
ANISOCYTOSIS BLD QL: ABNORMAL
AST SERPL-CCNC: 31 U/L (ref 15–46)
BASE EXCESS ARTERIAL: -4 MMOL/L (ref -3–3)
BILIRUB SERPL-MCNC: 0.4 MG/DL (ref 0.2–1.3)
BLD GP AB SCN SERPL QL: NEGATIVE
BUN BLD-MCNC: 48 MG/DL (ref 7–20)
BUN BLDV-MCNC: 46 MG/DL (ref 6–20)
BUN/CREAT SERPL: 14.1 (ref 7.1–23.5)
BURR CELLS BLD QL SMEAR: ABNORMAL
CALCIUM SERPL-MCNC: 8.9 MG/DL (ref 8.5–10.5)
CALCIUM SPEC-SCNC: 8 MG/DL (ref 8.4–10.2)
CHLORIDE BLD-SCNC: 102 MMOL/L (ref 98–107)
CHLORIDE SERPL-SCNC: 106 MMOL/L (ref 98–107)
CK SERPL-CCNC: 35 U/L (ref 30–170)
CO2 SERPL-SCNC: 20 MMOL/L (ref 26–30)
CO2: 21 MMOL/L (ref 20–30)
CREAT BLD-MCNC: 3.4 MG/DL (ref 0.6–1.3)
CREAT SERPL-MCNC: 2.7 MG/DL (ref 0.4–1.2)
D-LACTATE SERPL-SCNC: 1.1 MMOL/L (ref 0.5–2)
DACRYOCYTES BLD QL SMEAR: ABNORMAL
DEPRECATED RDW RBC AUTO: 58.7 FL (ref 37–54)
DOHLE BODIES: PRESENT
ERYTHROCYTE [DISTWIDTH] IN BLOOD BY AUTOMATED COUNT: 20.6 % (ref 11.5–14.5)
FIO2: 0.21 %
GFR AFRICAN AMERICAN: 22
GFR NON-AFRICAN AMERICAN: 18
GFR SERPL CREATININE-BSD FRML MDRD: 14 ML/MIN/1.73
GFR SERPL CREATININE-BSD FRML MDRD: 17 ML/MIN/1.73
GLOBULIN UR ELPH-MCNC: 3.2 GM/DL
GLUCOSE BLD-MCNC: 105 MG/DL (ref 74–106)
GLUCOSE BLD-MCNC: 117 MG/DL (ref 74–106)
GLUCOSE BLD-MCNC: 118 MG/DL (ref 74–106)
GLUCOSE BLD-MCNC: 136 MG/DL (ref 74–106)
GLUCOSE BLD-MCNC: 65 MG/DL (ref 74–106)
GLUCOSE BLD-MCNC: 78 MG/DL (ref 74–106)
GLUCOSE BLD-MCNC: 79 MG/DL (ref 74–98)
GLUCOSE BLD-MCNC: 80 MG/DL (ref 74–106)
GLUCOSE BLD-MCNC: 83 MG/DL (ref 74–106)
GLUCOSE BLD-MCNC: 87 MG/DL (ref 74–106)
GLUCOSE BLD-MCNC: 93 MG/DL (ref 74–106)
GLUCOSE BLD-MCNC: 96 MG/DL (ref 74–106)
GLUCOSE BLDC GLUCOMTR-MCNC: 77 MG/DL (ref 70–130)
HCO3 ARTERIAL: 21.2 MMOL/L (ref 22–26)
HCT VFR BLD AUTO: 22.8 % (ref 37–47)
HCT VFR BLD CALC: 26.2 % (ref 37–47)
HEMOGLOBIN: 7.8 G/DL (ref 11.5–16.5)
HGB BLD-MCNC: 7.4 G/DL (ref 12–16)
HYPOCHROMIA BLD QL: ABNORMAL
INR PPP: 1.57 (ref 0.9–1.1)
LACTIC ACID: 1.8 MMOL/L (ref 0.4–2)
LYMPHOCYTES # BLD MANUAL: 2.13 10*3/MM3 (ref 0.6–3.4)
LYMPHOCYTES NFR BLD MANUAL: 1 % (ref 0–12)
LYMPHOCYTES NFR BLD MANUAL: 13 % (ref 10–50)
MACROCYTES BLD QL SMEAR: ABNORMAL
MAGNESIUM SERPL-MCNC: 2.5 MG/DL (ref 1.6–2.3)
MCH RBC QN AUTO: 24.5 PG (ref 27–32)
MCH RBC QN AUTO: 25.7 PG (ref 27–31)
MCHC RBC AUTO-ENTMCNC: 29.8 G/DL (ref 31–35)
MCHC RBC AUTO-ENTMCNC: 32.5 G/DL (ref 30–37)
MCV RBC AUTO: 79.2 FL (ref 81–99)
MCV RBC AUTO: 82.1 FL (ref 80–100)
METAMYELOCYTES NFR BLD MANUAL: 2 % (ref 0–0)
MICROCYTES BLD QL: ABNORMAL
MONOCYTES # BLD AUTO: 0.16 10*3/MM3 (ref 0–0.9)
NEUTROPHILS # BLD AUTO: 13.73 10*3/MM3 (ref 2–6.9)
NEUTROPHILS NFR BLD MANUAL: 70 % (ref 37–80)
NEUTS BAND NFR BLD MANUAL: 14 % (ref 0–6)
O2 SAT, ARTERIAL: 92.9 %
O2 THERAPY: ABNORMAL
PCO2 ARTERIAL: 39.2 MMHG (ref 35–45)
PDW BLD-RTO: 21 % (ref 11–16)
PERFORMED ON: ABNORMAL
PERFORMED ON: NORMAL
PH ARTERIAL: 7.35 (ref 7.35–7.45)
PHOSPHATE SERPL-MCNC: 6.8 MG/DL (ref 2.5–4.5)
PLATELET # BLD AUTO: 140 10*3/MM3 (ref 130–400)
PLATELET # BLD: 118 K/UL (ref 150–400)
PMV BLD AUTO: 11.3 FL (ref 6–10)
PO2 ARTERIAL: 81.2 MMHG (ref 80–100)
POLYCHROMASIA BLD QL SMEAR: ABNORMAL
POTASSIUM BLD-SCNC: 5.5 MMOL/L (ref 3.5–5.1)
POTASSIUM SERPL-SCNC: 5.4 MMOL/L (ref 3.4–5.1)
PROT SERPL-MCNC: 6.1 G/DL (ref 6.3–8.2)
PROTHROMBIN TIME: 17.4 SECONDS (ref 9.3–12.1)
RBC # BLD AUTO: 2.88 10*6/MM3 (ref 4.2–5.4)
RBC # BLD: 3.19 M/UL (ref 3.8–5.8)
RH BLD: POSITIVE
RH BLD: POSITIVE
SMALL PLATELETS BLD QL SMEAR: ADEQUATE
SODIUM BLD-SCNC: 134 MMOL/L (ref 137–145)
SODIUM BLD-SCNC: 136 MMOL/L (ref 136–145)
T&S EXPIRATION DATE: NORMAL
T4 FREE SERPL-MCNC: 1.14 NG/DL (ref 0.78–2.19)
TARGETS BLD QL SMEAR: ABNORMAL
TCO2 ARTERIAL: 22.4 MMOL/L (ref 24–30)
TOXIC GRANULATION: ABNORMAL
TROPONIN I SERPL-MCNC: 0.02 NG/ML (ref 0–0.03)
TROPONIN: <0.3 NG/ML
TSH SERPL DL<=0.05 MIU/L-ACNC: 12.4 MIU/ML (ref 0.47–4.68)
VANCOMYCIN SERPL-MCNC: 11.25 MCG/ML (ref 5–40)
WBC # BLD: 11.1 K/UL (ref 4–11)
WBC NRBC COR # BLD: 16.35 10*3/MM3 (ref 4.8–10.8)

## 2019-01-10 PROCEDURE — 2580000003 HC RX 258: Performed by: PEDIATRICS

## 2019-01-10 PROCEDURE — 99239 HOSP IP/OBS DSCHRG MGMT >30: CPT | Performed by: INTERNAL MEDICINE

## 2019-01-10 PROCEDURE — 36415 COLL VENOUS BLD VENIPUNCTURE: CPT

## 2019-01-10 PROCEDURE — 87081 CULTURE SCREEN ONLY: CPT | Performed by: INTERNAL MEDICINE

## 2019-01-10 PROCEDURE — 87070 CULTURE OTHR SPECIMN AEROBIC: CPT | Performed by: INTERNAL MEDICINE

## 2019-01-10 PROCEDURE — 81001 URINALYSIS AUTO W/SCOPE: CPT | Performed by: INTERNAL MEDICINE

## 2019-01-10 PROCEDURE — 80048 BASIC METABOLIC PNL TOTAL CA: CPT

## 2019-01-10 PROCEDURE — 82550 ASSAY OF CK (CPK): CPT | Performed by: INTERNAL MEDICINE

## 2019-01-10 PROCEDURE — 84443 ASSAY THYROID STIM HORMONE: CPT | Performed by: INTERNAL MEDICINE

## 2019-01-10 PROCEDURE — 87040 BLOOD CULTURE FOR BACTERIA: CPT

## 2019-01-10 PROCEDURE — 87086 URINE CULTURE/COLONY COUNT: CPT | Performed by: HOSPITALIST

## 2019-01-10 PROCEDURE — 86850 RBC ANTIBODY SCREEN: CPT | Performed by: INTERNAL MEDICINE

## 2019-01-10 PROCEDURE — 2580000003 HC RX 258: Performed by: INTERNAL MEDICINE

## 2019-01-10 PROCEDURE — 25010000002 HYDROCORTISONE SODIUM SUCCINATE 100 MG RECONSTITUTED SOLUTION: Performed by: INTERNAL MEDICINE

## 2019-01-10 PROCEDURE — 85027 COMPLETE CBC AUTOMATED: CPT | Performed by: INTERNAL MEDICINE

## 2019-01-10 PROCEDURE — 82803 BLOOD GASES ANY COMBINATION: CPT

## 2019-01-10 PROCEDURE — 6360000002 HC RX W HCPCS: Performed by: NURSE PRACTITIONER

## 2019-01-10 PROCEDURE — 86920 COMPATIBILITY TEST SPIN: CPT

## 2019-01-10 PROCEDURE — 82962 GLUCOSE BLOOD TEST: CPT

## 2019-01-10 PROCEDURE — 2580000003 HC RX 258: Performed by: NURSE PRACTITIONER

## 2019-01-10 PROCEDURE — 84484 ASSAY OF TROPONIN QUANT: CPT

## 2019-01-10 PROCEDURE — 83735 ASSAY OF MAGNESIUM: CPT | Performed by: INTERNAL MEDICINE

## 2019-01-10 PROCEDURE — 83605 ASSAY OF LACTIC ACID: CPT

## 2019-01-10 PROCEDURE — 86900 BLOOD TYPING SEROLOGIC ABO: CPT | Performed by: INTERNAL MEDICINE

## 2019-01-10 PROCEDURE — 84300 ASSAY OF URINE SODIUM: CPT | Performed by: INTERNAL MEDICINE

## 2019-01-10 PROCEDURE — 80053 COMPREHEN METABOLIC PANEL: CPT | Performed by: INTERNAL MEDICINE

## 2019-01-10 PROCEDURE — 99223 1ST HOSP IP/OBS HIGH 75: CPT | Performed by: INTERNAL MEDICINE

## 2019-01-10 PROCEDURE — 84439 ASSAY OF FREE THYROXINE: CPT | Performed by: INTERNAL MEDICINE

## 2019-01-10 PROCEDURE — 83605 ASSAY OF LACTIC ACID: CPT | Performed by: INTERNAL MEDICINE

## 2019-01-10 PROCEDURE — 6360000002 HC RX W HCPCS: Performed by: PEDIATRICS

## 2019-01-10 PROCEDURE — 87186 SC STD MICRODIL/AGAR DIL: CPT | Performed by: INTERNAL MEDICINE

## 2019-01-10 PROCEDURE — 87205 SMEAR GRAM STAIN: CPT | Performed by: INTERNAL MEDICINE

## 2019-01-10 PROCEDURE — 80202 ASSAY OF VANCOMYCIN: CPT | Performed by: INTERNAL MEDICINE

## 2019-01-10 PROCEDURE — 86900 BLOOD TYPING SEROLOGIC ABO: CPT

## 2019-01-10 PROCEDURE — 6370000000 HC RX 637 (ALT 250 FOR IP): Performed by: NURSE PRACTITIONER

## 2019-01-10 PROCEDURE — 86901 BLOOD TYPING SEROLOGIC RH(D): CPT | Performed by: INTERNAL MEDICINE

## 2019-01-10 PROCEDURE — 25010000002 PIPERACILLIN SOD-TAZOBACTAM PER 1 G: Performed by: INTERNAL MEDICINE

## 2019-01-10 PROCEDURE — 36600 WITHDRAWAL OF ARTERIAL BLOOD: CPT

## 2019-01-10 PROCEDURE — 86901 BLOOD TYPING SEROLOGIC RH(D): CPT

## 2019-01-10 PROCEDURE — 36415 COLL VENOUS BLD VENIPUNCTURE: CPT | Performed by: INTERNAL MEDICINE

## 2019-01-10 PROCEDURE — 85007 BL SMEAR W/DIFF WBC COUNT: CPT | Performed by: INTERNAL MEDICINE

## 2019-01-10 PROCEDURE — 2500000003 HC RX 250 WO HCPCS: Performed by: INTERNAL MEDICINE

## 2019-01-10 PROCEDURE — 6360000002 HC RX W HCPCS: Performed by: INTERNAL MEDICINE

## 2019-01-10 PROCEDURE — 84484 ASSAY OF TROPONIN QUANT: CPT | Performed by: INTERNAL MEDICINE

## 2019-01-10 PROCEDURE — 74176 CT ABD & PELVIS W/O CONTRAST: CPT

## 2019-01-10 PROCEDURE — 71250 CT THORAX DX C-: CPT

## 2019-01-10 PROCEDURE — 85610 PROTHROMBIN TIME: CPT | Performed by: INTERNAL MEDICINE

## 2019-01-10 PROCEDURE — 87077 CULTURE AEROBIC IDENTIFY: CPT | Performed by: INTERNAL MEDICINE

## 2019-01-10 PROCEDURE — 85027 COMPLETE CBC AUTOMATED: CPT

## 2019-01-10 PROCEDURE — 84100 ASSAY OF PHOSPHORUS: CPT | Performed by: INTERNAL MEDICINE

## 2019-01-10 RX ORDER — NALOXONE HCL 0.4 MG/ML
0.4 VIAL (ML) INJECTION
Status: DISCONTINUED | OUTPATIENT
Start: 2019-01-10 | End: 2019-01-22 | Stop reason: HOSPADM

## 2019-01-10 RX ORDER — VANCOMYCIN HYDROCHLORIDE 500 MG/10ML
INJECTION, POWDER, LYOPHILIZED, FOR SOLUTION INTRAVENOUS
Status: DISCONTINUED
Start: 2019-01-10 | End: 2019-01-10 | Stop reason: ALTCHOICE

## 2019-01-10 RX ORDER — DEXTROSE MONOHYDRATE 25 G/50ML
25 INJECTION, SOLUTION INTRAVENOUS
Status: DISCONTINUED | OUTPATIENT
Start: 2019-01-10 | End: 2019-01-22 | Stop reason: HOSPADM

## 2019-01-10 RX ORDER — SODIUM CHLORIDE 9 MG/ML
INJECTION, SOLUTION INTRAVENOUS CONTINUOUS
Status: DISCONTINUED | OUTPATIENT
Start: 2019-01-10 | End: 2019-01-10

## 2019-01-10 RX ORDER — ONDANSETRON 2 MG/ML
4 INJECTION INTRAMUSCULAR; INTRAVENOUS EVERY 6 HOURS PRN
Status: DISCONTINUED | OUTPATIENT
Start: 2019-01-10 | End: 2019-01-22 | Stop reason: HOSPADM

## 2019-01-10 RX ORDER — VANCOMYCIN HYDROCHLORIDE 1 G/20ML
INJECTION, POWDER, LYOPHILIZED, FOR SOLUTION INTRAVENOUS
Status: DISCONTINUED
Start: 2019-01-10 | End: 2019-01-10 | Stop reason: ALTCHOICE

## 2019-01-10 RX ORDER — LEVOTHYROXINE SODIUM 0.05 MG/1
50 TABLET ORAL
Status: DISCONTINUED | OUTPATIENT
Start: 2019-01-11 | End: 2019-01-14

## 2019-01-10 RX ORDER — SODIUM CHLORIDE 0.9 % (FLUSH) 0.9 %
1-10 SYRINGE (ML) INJECTION AS NEEDED
Status: DISCONTINUED | OUTPATIENT
Start: 2019-01-10 | End: 2019-01-22 | Stop reason: HOSPADM

## 2019-01-10 RX ORDER — LEVOTHYROXINE SODIUM ANHYDROUS 100 UG/5ML
25 INJECTION, POWDER, LYOPHILIZED, FOR SOLUTION INTRAVENOUS ONCE
Status: COMPLETED | OUTPATIENT
Start: 2019-01-10 | End: 2019-01-10

## 2019-01-10 RX ORDER — DEXTROSE AND SODIUM CHLORIDE 5; .9 G/100ML; G/100ML
100 INJECTION, SOLUTION INTRAVENOUS CONTINUOUS
Status: DISCONTINUED | OUTPATIENT
Start: 2019-01-10 | End: 2019-01-11

## 2019-01-10 RX ORDER — ONDANSETRON 4 MG/1
4 TABLET, FILM COATED ORAL EVERY 6 HOURS PRN
Status: DISCONTINUED | OUTPATIENT
Start: 2019-01-10 | End: 2019-01-22 | Stop reason: HOSPADM

## 2019-01-10 RX ORDER — ACETAMINOPHEN 325 MG/1
650 TABLET ORAL EVERY 4 HOURS PRN
Status: DISCONTINUED | OUTPATIENT
Start: 2019-01-10 | End: 2019-01-22 | Stop reason: HOSPADM

## 2019-01-10 RX ORDER — NICOTINE POLACRILEX 4 MG
1 LOZENGE BUCCAL
Status: DISCONTINUED | OUTPATIENT
Start: 2019-01-10 | End: 2019-01-22 | Stop reason: HOSPADM

## 2019-01-10 RX ORDER — DEXTROSE MONOHYDRATE 50 MG/ML
INJECTION, SOLUTION INTRAVENOUS
Status: DISPENSED
Start: 2019-01-10 | End: 2019-01-10

## 2019-01-10 RX ORDER — SODIUM CHLORIDE 0.9 % (FLUSH) 0.9 %
3 SYRINGE (ML) INJECTION EVERY 12 HOURS SCHEDULED
Status: DISCONTINUED | OUTPATIENT
Start: 2019-01-10 | End: 2019-01-22 | Stop reason: HOSPADM

## 2019-01-10 RX ORDER — ONDANSETRON 4 MG/1
4 TABLET, ORALLY DISINTEGRATING ORAL EVERY 6 HOURS PRN
Status: DISCONTINUED | OUTPATIENT
Start: 2019-01-10 | End: 2019-01-22 | Stop reason: HOSPADM

## 2019-01-10 RX ORDER — SODIUM CHLORIDE 9 MG/ML
INJECTION, SOLUTION INTRAVENOUS ONCE
Status: DISCONTINUED | OUTPATIENT
Start: 2019-01-10 | End: 2019-01-10 | Stop reason: HOSPADM

## 2019-01-10 RX ORDER — MORPHINE SULFATE 2 MG/ML
2 INJECTION, SOLUTION INTRAMUSCULAR; INTRAVENOUS EVERY 4 HOURS PRN
Status: ACTIVE | OUTPATIENT
Start: 2019-01-10 | End: 2019-01-20

## 2019-01-10 RX ORDER — PANTOPRAZOLE SODIUM 40 MG/10ML
40 INJECTION, POWDER, LYOPHILIZED, FOR SOLUTION INTRAVENOUS
Status: DISCONTINUED | OUTPATIENT
Start: 2019-01-11 | End: 2019-01-14

## 2019-01-10 RX ADMIN — ENOXAPARIN SODIUM 40 MG: 40 INJECTION SUBCUTANEOUS at 09:40

## 2019-01-10 RX ADMIN — HYDROCORTISONE SODIUM SUCCINATE 50 MG: 100 INJECTION, POWDER, FOR SOLUTION INTRAMUSCULAR; INTRAVENOUS at 12:33

## 2019-01-10 RX ADMIN — Medication 10 ML: at 08:30

## 2019-01-10 RX ADMIN — SODIUM CHLORIDE: 9 INJECTION, SOLUTION INTRAVENOUS at 06:19

## 2019-01-10 RX ADMIN — LEVOTHYROXINE SODIUM ANHYDROUS 25 MCG: 100 INJECTION, POWDER, LYOPHILIZED, FOR SOLUTION INTRAVENOUS at 10:34

## 2019-01-10 RX ADMIN — Medication 1 APPLICATION: at 23:05

## 2019-01-10 RX ADMIN — HYDROCORTISONE SODIUM SUCCINATE 50 MG: 100 INJECTION, POWDER, FOR SOLUTION INTRAMUSCULAR; INTRAVENOUS at 06:52

## 2019-01-10 RX ADMIN — HYDROCORTISONE SODIUM SUCCINATE 50 MG: 100 INJECTION, POWDER, FOR SOLUTION INTRAMUSCULAR; INTRAVENOUS at 18:34

## 2019-01-10 RX ADMIN — DEXTROSE MONOHYDRATE 12.5 G: 25 INJECTION, SOLUTION INTRAVENOUS at 06:10

## 2019-01-10 RX ADMIN — DEXTROSE AND SODIUM CHLORIDE 100 ML/HR: 5; 900 INJECTION, SOLUTION INTRAVENOUS at 21:45

## 2019-01-10 RX ADMIN — LEVOTHYROXINE SODIUM 50 MCG: 50 TABLET ORAL at 06:19

## 2019-01-10 RX ADMIN — TAZOBACTAM SODIUM AND PIPERACILLIN SODIUM 4.5 G: 500; 4 INJECTION, SOLUTION INTRAVENOUS at 23:05

## 2019-01-10 RX ADMIN — HYDROCORTISONE SODIUM SUCCINATE 50 MG: 100 INJECTION, POWDER, FOR SOLUTION INTRAMUSCULAR; INTRAVENOUS at 21:21

## 2019-01-10 RX ADMIN — VANCOMYCIN 1500 MG: 1 INJECTION, SOLUTION INTRAVENOUS at 07:10

## 2019-01-10 RX ADMIN — SODIUM CHLORIDE, PRESERVATIVE FREE 3 ML: 5 INJECTION INTRAVENOUS at 21:44

## 2019-01-11 ENCOUNTER — APPOINTMENT (OUTPATIENT)
Dept: CT IMAGING | Facility: HOSPITAL | Age: 59
End: 2019-01-11

## 2019-01-11 ENCOUNTER — APPOINTMENT (OUTPATIENT)
Dept: ULTRASOUND IMAGING | Facility: HOSPITAL | Age: 59
End: 2019-01-11

## 2019-01-11 LAB
A-A DO2: ABNORMAL MMHG
ALBUMIN SERPL-MCNC: 3.1 G/DL (ref 3.5–5)
ALBUMIN/GLOB SERPL: 1 G/DL (ref 1–2)
ALP SERPL-CCNC: 305 U/L (ref 38–126)
ALT SERPL W P-5'-P-CCNC: 37 U/L (ref 13–69)
ANION GAP SERPL CALCULATED.3IONS-SCNC: 13.6 MMOL/L (ref 10–20)
ANION GAP SERPL CALCULATED.3IONS-SCNC: 15.7 MMOL/L (ref 10–20)
ARTERIAL PATENCY WRIST A: POSITIVE
AST SERPL-CCNC: 28 U/L (ref 15–46)
ATMOSPHERIC PRESS: 743 MMHG
BACTERIA UR QL AUTO: ABNORMAL /HPF
BASE EXCESS BLDA CALC-SCNC: -5.7 MMOL/L (ref 0–2)
BDY SITE: ABNORMAL
BILIRUB SERPL-MCNC: 0.4 MG/DL (ref 0.2–1.3)
BILIRUB UR QL STRIP: ABNORMAL
BUN BLD-MCNC: 50 MG/DL (ref 7–20)
BUN BLD-MCNC: 54 MG/DL (ref 7–20)
BUN/CREAT SERPL: 14.3 (ref 7.1–23.5)
BUN/CREAT SERPL: 15.4 (ref 7.1–23.5)
CALCIUM SPEC-SCNC: 7.9 MG/DL (ref 8.4–10.2)
CALCIUM SPEC-SCNC: 8 MG/DL (ref 8.4–10.2)
CHLORIDE SERPL-SCNC: 106 MMOL/L (ref 98–107)
CHLORIDE SERPL-SCNC: 107 MMOL/L (ref 98–107)
CLARITY UR: ABNORMAL
CO2 SERPL-SCNC: 19 MMOL/L (ref 26–30)
CO2 SERPL-SCNC: 21 MMOL/L (ref 26–30)
COHGB MFR BLD: 1 % (ref 0–2)
COLOR UR: YELLOW
CREAT BLD-MCNC: 3.5 MG/DL (ref 0.6–1.3)
CREAT BLD-MCNC: 3.5 MG/DL (ref 0.6–1.3)
DEPRECATED RDW RBC AUTO: 59.9 FL (ref 37–54)
ERYTHROCYTE [DISTWIDTH] IN BLOOD BY AUTOMATED COUNT: 21.1 % (ref 11.5–14.5)
GAS FLOW AIRWAY: 2 LPM
GFR SERPL CREATININE-BSD FRML MDRD: 13 ML/MIN/1.73
GFR SERPL CREATININE-BSD FRML MDRD: 13 ML/MIN/1.73
GFR SERPL CREATININE-BSD FRML MDRD: 16 ML/MIN/1.73
GFR SERPL CREATININE-BSD FRML MDRD: 16 ML/MIN/1.73
GLOBULIN UR ELPH-MCNC: 3.1 GM/DL
GLUCOSE BLD-MCNC: 139 MG/DL (ref 74–98)
GLUCOSE BLD-MCNC: 169 MG/DL (ref 74–98)
GLUCOSE BLDC GLUCOMTR-MCNC: 160 MG/DL (ref 70–130)
GLUCOSE BLDC GLUCOMTR-MCNC: 171 MG/DL (ref 70–130)
GLUCOSE BLDC GLUCOMTR-MCNC: 179 MG/DL (ref 70–130)
GLUCOSE BLDC GLUCOMTR-MCNC: 195 MG/DL (ref 70–130)
GLUCOSE UR STRIP-MCNC: NEGATIVE MG/DL
HCO3 BLDA-SCNC: 19.6 MMOL/L (ref 22–28)
HCT VFR BLD AUTO: 23.9 % (ref 37–47)
HCT VFR BLD CALC: 23.8 %
HGB BLD-MCNC: 7.6 G/DL (ref 12–16)
HGB BLDA-MCNC: 7.8 G/DL (ref 12–18)
HGB UR QL STRIP.AUTO: ABNORMAL
HOROWITZ INDEX BLD+IHG-RTO: 28 %
HYALINE CASTS UR QL AUTO: ABNORMAL /LPF
KETONES UR QL STRIP: ABNORMAL
LEUKOCYTE ESTERASE UR QL STRIP.AUTO: ABNORMAL
MAGNESIUM SERPL-MCNC: 2.6 MG/DL (ref 1.6–2.3)
MCH RBC QN AUTO: 25.7 PG (ref 27–31)
MCHC RBC AUTO-ENTMCNC: 31.8 G/DL (ref 30–37)
MCV RBC AUTO: 80.7 FL (ref 81–99)
METHGB BLD QL: 1.1 % (ref 0–1.5)
MODALITY: ABNORMAL
NITRITE UR QL STRIP: NEGATIVE
NOTE: ABNORMAL
OXYHGB MFR BLDV: 96.7 % (ref 94–99)
PCO2 BLDA: 36.7 MM HG (ref 35–45)
PCO2 TEMP ADJ BLD: ABNORMAL MM HG (ref 35–45)
PH BLDA: 7.34 PH UNITS (ref 7.3–7.5)
PH UR STRIP.AUTO: <=5 [PH] (ref 5–8)
PH, TEMP CORRECTED: ABNORMAL PH UNITS
PHOSPHATE SERPL-MCNC: 7.4 MG/DL (ref 2.5–4.5)
PLATELET # BLD AUTO: 142 10*3/MM3 (ref 130–400)
PMV BLD AUTO: ABNORMAL FL (ref 6–12)
PO2 BLDA: 94.5 MM HG (ref 75–100)
PO2 TEMP ADJ BLD: ABNORMAL MM HG (ref 83–108)
POTASSIUM BLD-SCNC: 5.6 MMOL/L (ref 3.5–5.1)
POTASSIUM BLD-SCNC: 5.7 MMOL/L (ref 3.5–5.1)
PROT SERPL-MCNC: 6.2 G/DL (ref 6.3–8.2)
PROT UR QL STRIP: ABNORMAL
RBC # BLD AUTO: 2.96 10*6/MM3 (ref 4.2–5.4)
RBC # UR: ABNORMAL /HPF
REF LAB TEST METHOD: ABNORMAL
SAO2 % BLDCOA: 98.8 % (ref 94–100)
SODIUM BLD-SCNC: 135 MMOL/L (ref 137–145)
SODIUM BLD-SCNC: 136 MMOL/L (ref 137–145)
SODIUM UR-SCNC: <5 MMOL/L (ref 30–90)
SP GR UR STRIP: 1.02 (ref 1–1.03)
SQUAMOUS #/AREA URNS HPF: ABNORMAL /HPF
UROBILINOGEN UR QL STRIP: ABNORMAL
VENTILATOR MODE: ABNORMAL
WBC NRBC COR # BLD: 18.35 10*3/MM3 (ref 4.8–10.8)
WBC UR QL AUTO: ABNORMAL /HPF

## 2019-01-11 PROCEDURE — 25010000002 PIPERACILLIN SOD-TAZOBACTAM PER 1 G: Performed by: INTERNAL MEDICINE

## 2019-01-11 PROCEDURE — P9016 RBC LEUKOCYTES REDUCED: HCPCS

## 2019-01-11 PROCEDURE — 25010000002 ENOXAPARIN PER 10 MG: Performed by: INTERNAL MEDICINE

## 2019-01-11 PROCEDURE — 63710000001 INSULIN ASPART PER 5 UNITS: Performed by: INTERNAL MEDICINE

## 2019-01-11 PROCEDURE — 25010000002 THIAMINE PER 100 MG: Performed by: HOSPITALIST

## 2019-01-11 PROCEDURE — 82375 ASSAY CARBOXYHB QUANT: CPT

## 2019-01-11 PROCEDURE — 25010000002 HEPARIN (PORCINE) PER 1000 UNITS: Performed by: HOSPITALIST

## 2019-01-11 PROCEDURE — 36430 TRANSFUSION BLD/BLD COMPNT: CPT

## 2019-01-11 PROCEDURE — 93923 UPR/LXTR ART STDY 3+ LVLS: CPT

## 2019-01-11 PROCEDURE — 25010000002 HYDROCORTISONE SODIUM SUCCINATE 100 MG RECONSTITUTED SOLUTION: Performed by: INTERNAL MEDICINE

## 2019-01-11 PROCEDURE — 82805 BLOOD GASES W/O2 SATURATION: CPT

## 2019-01-11 PROCEDURE — 85027 COMPLETE CBC AUTOMATED: CPT | Performed by: INTERNAL MEDICINE

## 2019-01-11 PROCEDURE — 87040 BLOOD CULTURE FOR BACTERIA: CPT | Performed by: HOSPITALIST

## 2019-01-11 PROCEDURE — 80053 COMPREHEN METABOLIC PANEL: CPT | Performed by: INTERNAL MEDICINE

## 2019-01-11 PROCEDURE — 36600 WITHDRAWAL OF ARTERIAL BLOOD: CPT

## 2019-01-11 PROCEDURE — 84100 ASSAY OF PHOSPHORUS: CPT | Performed by: INTERNAL MEDICINE

## 2019-01-11 PROCEDURE — 25010000002 VANCOMYCIN 5 G RECONSTITUTED SOLUTION 5,000 MG VIAL: Performed by: INTERNAL MEDICINE

## 2019-01-11 PROCEDURE — 82962 GLUCOSE BLOOD TEST: CPT

## 2019-01-11 PROCEDURE — 83050 HGB METHEMOGLOBIN QUAN: CPT

## 2019-01-11 PROCEDURE — 99222 1ST HOSP IP/OBS MODERATE 55: CPT | Performed by: PODIATRIST

## 2019-01-11 PROCEDURE — 86900 BLOOD TYPING SEROLOGIC ABO: CPT

## 2019-01-11 PROCEDURE — 93970 EXTREMITY STUDY: CPT

## 2019-01-11 PROCEDURE — 99233 SBSQ HOSP IP/OBS HIGH 50: CPT | Performed by: HOSPITALIST

## 2019-01-11 PROCEDURE — 83735 ASSAY OF MAGNESIUM: CPT | Performed by: INTERNAL MEDICINE

## 2019-01-11 PROCEDURE — 70450 CT HEAD/BRAIN W/O DYE: CPT

## 2019-01-11 RX ORDER — MECLIZINE HCL 25MG 25 MG/1
25 TABLET, CHEWABLE ORAL 3 TIMES DAILY PRN
Status: ON HOLD | COMMUNITY
End: 2019-04-29

## 2019-01-11 RX ORDER — FAMOTIDINE 10 MG/ML
20 INJECTION, SOLUTION INTRAVENOUS DAILY
Status: DISCONTINUED | OUTPATIENT
Start: 2019-01-12 | End: 2019-01-22 | Stop reason: HOSPADM

## 2019-01-11 RX ORDER — HEPARIN SODIUM 5000 [USP'U]/ML
5000 INJECTION, SOLUTION INTRAVENOUS; SUBCUTANEOUS EVERY 8 HOURS SCHEDULED
Status: DISCONTINUED | OUTPATIENT
Start: 2019-01-11 | End: 2019-01-16

## 2019-01-11 RX ORDER — GLIPIZIDE 10 MG/1
20 TABLET ORAL 2 TIMES DAILY
COMMUNITY
End: 2019-01-22 | Stop reason: HOSPADM

## 2019-01-11 RX ORDER — GABAPENTIN 600 MG/1
600 TABLET ORAL 3 TIMES DAILY
Status: ON HOLD | COMMUNITY
End: 2019-01-22 | Stop reason: SDUPTHER

## 2019-01-11 RX ORDER — CEPHALEXIN 500 MG/1
500 CAPSULE ORAL 2 TIMES DAILY
Status: ON HOLD | COMMUNITY
Start: 2019-01-07 | End: 2019-01-12

## 2019-01-11 RX ORDER — SODIUM CHLORIDE 9 MG/ML
10 INJECTION, SOLUTION INTRAVENOUS CONTINUOUS
Status: DISCONTINUED | OUTPATIENT
Start: 2019-01-11 | End: 2019-01-22 | Stop reason: HOSPADM

## 2019-01-11 RX ORDER — SULFAMETHOXAZOLE AND TRIMETHOPRIM 800; 160 MG/1; MG/1
1 TABLET ORAL 2 TIMES DAILY
Status: ON HOLD | COMMUNITY
Start: 2019-01-07 | End: 2019-01-12

## 2019-01-11 RX ORDER — FERROUS SULFATE TAB EC 324 MG (65 MG FE EQUIVALENT) 324 (65 FE) MG
65 TABLET DELAYED RESPONSE ORAL 2 TIMES DAILY
Status: ON HOLD | COMMUNITY
End: 2019-04-29

## 2019-01-11 RX ORDER — BUMETANIDE 0.25 MG/ML
4 INJECTION INTRAMUSCULAR; INTRAVENOUS EVERY 6 HOURS
Status: COMPLETED | OUTPATIENT
Start: 2019-01-11 | End: 2019-01-11

## 2019-01-11 RX ORDER — INSULIN GLARGINE 100 [IU]/ML
30 INJECTION, SOLUTION SUBCUTANEOUS NIGHTLY
Status: ON HOLD | COMMUNITY
End: 2019-04-29

## 2019-01-11 RX ADMIN — HYDROCORTISONE SODIUM SUCCINATE 50 MG: 100 INJECTION, POWDER, FOR SOLUTION INTRAMUSCULAR; INTRAVENOUS at 10:09

## 2019-01-11 RX ADMIN — HYDROCORTISONE SODIUM SUCCINATE 50 MG: 100 INJECTION, POWDER, FOR SOLUTION INTRAMUSCULAR; INTRAVENOUS at 16:40

## 2019-01-11 RX ADMIN — PANTOPRAZOLE SODIUM 40 MG: 40 INJECTION, POWDER, FOR SOLUTION INTRAVENOUS at 06:13

## 2019-01-11 RX ADMIN — HEPARIN SODIUM 5000 UNITS: 5000 INJECTION, SOLUTION INTRAVENOUS; SUBCUTANEOUS at 16:41

## 2019-01-11 RX ADMIN — TAZOBACTAM SODIUM AND PIPERACILLIN SODIUM 4.5 G: 500; 4 INJECTION, SOLUTION INTRAVENOUS at 08:14

## 2019-01-11 RX ADMIN — SODIUM CHLORIDE, PRESERVATIVE FREE 3 ML: 5 INJECTION INTRAVENOUS at 10:10

## 2019-01-11 RX ADMIN — HYDROCORTISONE SODIUM SUCCINATE 50 MG: 100 INJECTION, POWDER, FOR SOLUTION INTRAMUSCULAR; INTRAVENOUS at 03:50

## 2019-01-11 RX ADMIN — HEPARIN SODIUM 5000 UNITS: 5000 INJECTION, SOLUTION INTRAVENOUS; SUBCUTANEOUS at 22:56

## 2019-01-11 RX ADMIN — TAZOBACTAM SODIUM AND PIPERACILLIN SODIUM 4.5 G: 500; 4 INJECTION, SOLUTION INTRAVENOUS at 16:40

## 2019-01-11 RX ADMIN — DEXTROSE AND SODIUM CHLORIDE 100 ML/HR: 5; 900 INJECTION, SOLUTION INTRAVENOUS at 06:14

## 2019-01-11 RX ADMIN — SODIUM CHLORIDE, PRESERVATIVE FREE 3 ML: 5 INJECTION INTRAVENOUS at 20:40

## 2019-01-11 RX ADMIN — BUMETANIDE 4 MG: 0.25 INJECTION INTRAMUSCULAR; INTRAVENOUS at 20:40

## 2019-01-11 RX ADMIN — VANCOMYCIN HYDROCHLORIDE 2000 MG: 500 INJECTION, POWDER, LYOPHILIZED, FOR SOLUTION INTRAVENOUS at 06:13

## 2019-01-11 RX ADMIN — ENOXAPARIN SODIUM 40 MG: 40 INJECTION SUBCUTANEOUS at 10:08

## 2019-01-11 RX ADMIN — HYDROCORTISONE SODIUM SUCCINATE 50 MG: 100 INJECTION, POWDER, FOR SOLUTION INTRAMUSCULAR; INTRAVENOUS at 20:41

## 2019-01-11 RX ADMIN — SODIUM CHLORIDE 150 ML/HR: 9 INJECTION, SOLUTION INTRAVENOUS at 16:40

## 2019-01-11 RX ADMIN — TAZOBACTAM SODIUM AND PIPERACILLIN SODIUM 4.5 G: 500; 4 INJECTION, SOLUTION INTRAVENOUS at 22:55

## 2019-01-11 RX ADMIN — INSULIN ASPART 2 UNITS: 100 INJECTION, SOLUTION INTRAVENOUS; SUBCUTANEOUS at 17:46

## 2019-01-11 RX ADMIN — INSULIN ASPART 2 UNITS: 100 INJECTION, SOLUTION INTRAVENOUS; SUBCUTANEOUS at 22:56

## 2019-01-11 RX ADMIN — BUMETANIDE 4 MG: 0.25 INJECTION INTRAMUSCULAR; INTRAVENOUS at 13:15

## 2019-01-11 RX ADMIN — FOLIC ACID 75 ML/HR: 5 INJECTION, SOLUTION INTRAMUSCULAR; INTRAVENOUS; SUBCUTANEOUS at 22:13

## 2019-01-11 RX ADMIN — SODIUM CHLORIDE 150 ML/HR: 9 INJECTION, SOLUTION INTRAVENOUS at 10:08

## 2019-01-11 NOTE — CONSULTS
Referring Provider: Hospitalist  Reason for Consultation: Rt foot wound, bl leg wounds    Patient Care Team:  Rosa Zamora MD as PCP - General (Internal Medicine)  Geremias Shepherd MD as Consulting Physician (General Surgery)    Chief complaint Rt foot wound, bl leg wounds, altered mental status, possible sepsis    Subjective .     History of present illness: Garry is a 58-year-old morbidly obese diabetic female seen in the intensive care unit today with nursing staff present.  Patient herself is a only currently arousable to pain and nonresponsive so all information and history is obtained through medical records.  I'm told she was admitted yesterday for altered mental status and possible sepsis.  She is extremely morbidly obese with BMI of 61.  She seen today with the absorbent towels around her legs as well as offloading heel boots.  She is on room air.  Nursing is concerned about her core body temperature as it is registering in the low 90s however she feels very warm and normal to touch.    Review of Systems  All systems were reviewed and negative except for chief complaint.    History  Past Medical History:   Diagnosis Date   • Diabetes mellitus (CMS/HCC)    • Disease of thyroid gland    • GERD (gastroesophageal reflux disease)    • Hypertension    ,   Past Surgical History:   Procedure Laterality Date   • EYE SURGERY     • LEG SURGERY     ,   Family History   Problem Relation Age of Onset   • Asthma Mother    • Hypertension Father    • Stroke Father    ,   Social History     Tobacco Use   • Smoking status: Never Smoker   • Smokeless tobacco: Never Used   Substance Use Topics   • Alcohol use: No     Frequency: Never   • Drug use: No   ,   Medications Prior to Admission   Medication Sig Dispense Refill Last Dose   • cephalexin (KEFLEX) 500 MG capsule Take 500 mg by mouth 2 (Two) Times a Day. X 10 days      • ferrous sulfate 324 (65 Fe) MG tablet delayed-release EC tablet Take 324 mg by mouth 2 (Two)  Times a Day.      • gabapentin (NEURONTIN) 600 MG tablet Take 600 mg by mouth 3 (Three) Times a Day.      • glipiZIDE (GLUCOTROL) 10 MG tablet Take 20 mg by mouth 2 (Two) Times a Day.      • Insulin Glargine (BASAGLAR KWIKPEN) 100 UNIT/ML injection pen Inject 30 Units under the skin into the appropriate area as directed Every Night.      • meclizine 25 MG chewable tablet chewable tablet Chew 25 mg 3 (Three) Times a Day As Needed (for dizziness or motion sickness).      • sulfamethoxazole-trimethoprim (BACTRIM DS,SEPTRA DS) 800-160 MG per tablet Take 1 tablet by mouth 2 (Two) Times a Day.      • allopurinol (ZYLOPRIM) 300 MG tablet Take 300 mg by mouth Daily.      • amLODIPine (NORVASC) 5 MG tablet Take 5 mg by mouth Daily.  0    • esomeprazole (nexIUM) 40 MG capsule Take 40 mg by mouth Every Morning Before Breakfast.  0    • furosemide (LASIX) 20 MG tablet Take 20 mg by mouth 2 (Two) Times a Day.      • RA ASPIRIN EC 81 MG EC tablet Take 81 mg by mouth Daily.  0    • RA VITAMIN C 500 MG tablet Take 500 mg by mouth Daily.  0    • RA VITAMIN D-3 2000 units capsule Take 2,000 Units by mouth Daily.  0    • simvastatin (ZOCOR) 20 MG tablet Take 20 mg by mouth Every Night.  0    • TRADJENTA 5 MG tablet tablet Take 5 mg by mouth Daily.      , Scheduled Meds:      bumetanide 4 mg Intravenous Q6H   heparin (porcine) 5,000 Units Subcutaneous Q8H   hydrocortisone sodium succinate 50 mg Intravenous Q6H   insulin aspart 0-7 Units Subcutaneous 4x Daily AC & at Bedtime   levothyroxine 50 mcg Oral Q AM   pantoprazole 40 mg Intravenous Q AM   piperacillin-tazobactam 4.5 g Intravenous Q8H   sodium chloride 3 mL Intravenous Q12H   vancomycin 2,000 mg Intravenous Q48H   , Continuous Infusions:      dextrose 5 % and sodium chloride 0.9 % 100 mL/hr Last Rate: 100 mL/hr (01/11/19 0614)   Pharmacy to dose vancomycin     Pharmacy to Dose Zosyn     Sodium chloride 150 mL/hr Last Rate: 150 mL/hr (01/11/19 1008)   Pharmacy Consult     , PRN  "Meds:  •  acetaminophen  •  dextrose  •  dextrose  •  glucagon (human recombinant)  •  Morphine **AND** naloxone  •  ondansetron **OR** ondansetron ODT **OR** ondansetron  •  Pharmacy to dose vancomycin  •  Pharmacy to Dose Zosyn  •  sodium chloride  •  Pharmacy Consult  •  zinc oxide and Allergies:  Metformin and related    Objective     Vital Signs   /86   Pulse 76   Temp (!) 91.8 °F (33.2 °C)   Resp 24   Ht 165.1 cm (65\")   Wt (!) 167 kg (367 lb 9.6 oz)   SpO2 99%   BMI 61.17 kg/m²     Physical Exam:  Physical exam of both lower extremities shows that she is morbidly obese with very significant skin changes to both lower extremities significant for/consistent with her chronic lymphedema/venous stasis.  There is weeping and moisture from both legs circumferentially.  She has a circumferential large areas of raised/elevated nodules and trophic skin changes and discoloration.  On the right plantar heel there is a black callused necrotic wound.  There is no odor present.   It does seem as if it most likely probes probably close to bone.  There is small amount of serous or serosanguineous drainage.  No bleeding present.  There is no fluctuant or softness or bogginess to the heel.  This almost appears as if it could have possibly been a puncture wound initially and has exacerbated from there but it's very difficult to tell especially with no past medical history.  Results Review: All laboratory data reviewed.  Blood glucose 169.  Sodium 136.  Potassium 5.6.  CO2 21.  Creatinine 3.5.  BUN 54.  Wound cultures pending, but to this point is positive for gram-positive cocci in pairs and gram-negative bacilli Urine sample was positive for ketones as well as a large amount of blood.  Significant protein along with multiple other significant findings in her urine.  She has had a CT scan of the head however this is yet to be read.  I blood cell count is 18.  Hemoglobin 7.6.  Hematocrit 23.9.  Magnesium 2.6.  " Phosphorus 7.4.  Lactic acid is 1.1  Imaging Results: No lower extremity imaging has been obtained.  sHe did have a Doppler which was negative for DVT however given the patient's body habitus and skin conditions there is no way this is an accurate test  Arterial Doppler was read as monophasic waveforms but again given the patient's body habitus and condition I don't feel this is an accurate assessment either.  Assessment/Plan   58-year-old morbidly obese(BMI 61) diabetic female with bilateral lymphedema/venous stasis and right heel wound which is unstageable    Cellulitis of both lower extremities    Altered mental status    Anemia    Bilateral edema of lower extremity    Cellulitis of lower extremity    Acute on chronic renal failure (CMS/HCC)    GERD (gastroesophageal reflux disease)    Hyperkalemia    Hypertension    Hypothermia    Hypothyroidism    Type 2 diabetes mellitus with complication (CMS/[facility])    Vitamin B12 deficiency    I saw the patient when nursing staff present.  She had a light dressing over the foot and we will leave that intact for now.  Her legs would probably benefit from Unna boot wraps but that may be somewhat strenuous and difficult to do right the second especially given her altered mental status and the fact that she doesn't seem to be communicating and would not be very helpful with nursing.  Some type of further imaging would be ideal but given her body habitus she will not fit in a MRI machine or a CT scanner.  The only imaging we can obtain repeat plain film x-rays which aren't can be the most useful.  She's had a wound swab performed which is pending.  Blood cultures are also pending.  I would not rely much on her noninvasive vascular studies at all as mentioned previously due to her body habitus those are extremely difficult and challenging exams.  I'll await further studies and see if we are able to find any type of source or other reason for her altered mental status other than  sepsis and the foot wound.  If her able to rule out other potential causes and she somewhat stabilizes I may consider taking her to the operating room for I&D/debridement of the heel wound does not feel that is somewhat questionable to perform at this time.  I recommend we keep the right foot wound covered for now.  I will hold on any type of the dressings or wrappings for the legs and follow-up with her in the morning to see if there are any changes.  I discussed the patients findings and my recommendations with patient, nursing staff and primary care team    Ar Rueda DPM  01/11/19  3:55 PM

## 2019-01-11 NOTE — CONSULTS
Patient was admitted by me as a hospitalist on 1/10/2019, for details see the admission H&P done by me.  I was consulted for nephrology today by the hospitalist service, I will continue to follow her for any further details see the progress note done by me today.

## 2019-01-11 NOTE — PROGRESS NOTES
"Nephrology Progress Note.    LOS: 1 day    Patient Care Team:  Rosa Zamora MD as PCP - General (Internal Medicine)  Geremias Shepherd MD as Consulting Physician (General Surgery)    Chief Complaint:  No chief complaint on file.      Subjective     Follow up for FREDDY and related issues.    Interval History:     Patient Complaints: none    Patient seen and examined this morning.  Events from last night noted.  She still minimally responsive.  There is significant edema.   She still has no meaningful interaction.      Review of Systems:    Patient is unresponsive and sedated no meaningful review of system is possible.      Objective     Vital Signs  /75   Pulse 87   Temp 97.6 °F (36.4 °C) (Oral)   Resp 20   Ht 165.1 cm (65\")   Wt (!) 167 kg (367 lb 9.6 oz)   SpO2 98%   BMI 61.17 kg/m²       No intake/output data recorded.  No intake or output data in the 24 hours ending 01/11/19 0745    Physical Exam:    General Appearance: no acute distress,   HEENT: Oral mucosa dry, extra occular movements intact. Sclera clear.  Skin: Warm and dry  Neck: supple, no JVD, trachea midline  Lungs:Chest shape is normal. Breath sounds heard bilaterally equally.  No wheezing   Heart: regular rate and rhythm. normal S1 and S2, no S3, no rub, peripheral pulses weak but palpable.  Abdomen: Obese, soft, non-tender,  present bowel sounds to auscultation  : no palpable bladder.  Extremities: 2 to 3 + edema, no cyanosis or clubbing.   Neuro: No meaningful interaction   Results Review:    Results from last 7 days   Lab Units  01/11/19   0419  01/10/19   2110   SODIUM mmol/L  135*  134*   POTASSIUM mmol/L  5.7*  5.5*   CHLORIDE mmol/L  106  106   CO2 mmol/L  19.0*  20.0*   BUN mg/dL  50*  48*   CREATININE mg/dL  3.50*  3.40*   CALCIUM mg/dL  8.0*  8.0*   BILIRUBIN mg/dL  0.4  0.4   ALK PHOS U/L  305*  276*   ALT (SGPT) U/L  37  38   AST (SGOT) U/L  28  31   GLUCOSE mg/dL  139*  79       Estimated Creatinine Clearance: 27.3 mL/min (A) " (by C-G formula based on SCr of 3.5 mg/dL (H)).    Results from last 7 days   Lab Units  01/11/19   0419  01/10/19   2110   MAGNESIUM mg/dL  2.6*  2.5*   PHOSPHORUS mg/dL  7.4*  6.8*             Results from last 7 days   Lab Units  01/11/19   0419  01/10/19   2112   WBC 10*3/mm3  18.35*  16.35*   HEMOGLOBIN g/dL  7.6*  7.4*   PLATELETS 10*3/mm3  142  140       Results from last 7 days   Lab Units  01/10/19   2112   INR   1.57*         Imaging Results (last 24 hours)     ** No results found for the last 24 hours. **          enoxaparin 40 mg Subcutaneous Q24H   hydrocortisone sodium succinate 50 mg Intravenous Q6H   insulin aspart 0-7 Units Subcutaneous 4x Daily AC & at Bedtime   levothyroxine 50 mcg Oral Q AM   pantoprazole 40 mg Intravenous Q AM   piperacillin-tazobactam 4.5 g Intravenous Q8H   sodium chloride 3 mL Intravenous Q12H   vancomycin 2,000 mg Intravenous Q48H       dextrose 5 % and sodium chloride 0.9 % 100 mL/hr Last Rate: 100 mL/hr (01/11/19 0614)   Pharmacy to dose vancomycin     Pharmacy to Dose Zosyn     Pharmacy Consult         Medication Review:   Current Facility-Administered Medications   Medication Dose Route Frequency Provider Last Rate Last Dose   • acetaminophen (TYLENOL) tablet 650 mg  650 mg Oral Q4H PRN Milad Leone MD, FASN       • dextrose (D50W) 25 g/ 50mL Intravenous Solution 25 g  25 g Intravenous Q15 Min PRN Milad Leone MD, FASN       • dextrose (GLUTOSE) oral gel 1 tube  1 tube Oral Q15 Min PRN Milad Leone MD, FASN       • dextrose 5 % and sodium chloride 0.9 % infusion  100 mL/hr Intravenous Continuous Milad Leone MD, FASN 100 mL/hr at 01/11/19 0614 100 mL/hr at 01/11/19 0614   • enoxaparin (LOVENOX) syringe 40 mg  40 mg Subcutaneous Q24H Milad Leone MD, FASN       • glucagon (human recombinant) (GLUCAGEN DIAGNOSTIC) injection 1 mg  1 mg Subcutaneous PRN Milad Leone MD, GILA       • hydrocortisone sodium succinate (Solu-CORTEF) injection 50 mg  50 mg  Intravenous Q6H Milad Leone MD, FASN   50 mg at 01/11/19 0350   • insulin aspart (novoLOG) injection 0-7 Units  0-7 Units Subcutaneous 4x Daily AC & at Bedtime Milad Leone MD, FASN       • levothyroxine (SYNTHROID, LEVOTHROID) tablet 50 mcg  50 mcg Oral Q AM Milad Leone MD, FASN       • Morphine sulfate (PF) injection 2 mg  2 mg Intravenous Q4H PRN Milad Leone MD, FASN        And   • naloxone (NARCAN) injection 0.4 mg  0.4 mg Intravenous Q5 Min PRN Milad Leone MD, FASN       • ondansetron (ZOFRAN) tablet 4 mg  4 mg Oral Q6H PRN Milad Leone MD, FASN        Or   • ondansetron ODT (ZOFRAN-ODT) disintegrating tablet 4 mg  4 mg Oral Q6H PRN Milad Leone MD, FASN        Or   • ondansetron (ZOFRAN) injection 4 mg  4 mg Intravenous Q6H PRN Milad Leone MD, FASN       • pantoprazole (PROTONIX) injection 40 mg  40 mg Intravenous Q AM Milad Leone MD, FASN   40 mg at 01/11/19 0613   • Pharmacy to dose vancomycin   Does not apply Continuous PRN Milad Leone MD, FASN       • Pharmacy to Dose Zosyn   Does not apply Continuous PRN Milad Leone MD, FASN       • piperacillin-tazobactam (ZOSYN) 4.5 g in iso-osmotic dextrose 100 mL IVPB (premix)  4.5 g Intravenous Q8H Milad Leone MD, FASN       • sodium chloride 0.9 % flush 1-10 mL  1-10 mL Intravenous PRN Milad Leone MD, FASN       • sodium chloride 0.9 % flush 3 mL  3 mL Intravenous Q12H Milad Leone MD, FASN   3 mL at 01/10/19 0504   • vancomycin 2000 mg in sodium chloride 0.9% 500 mL IVPB  2,000 mg Intravenous Q48H Milad Leone MD, GILA   2,000 mg at 01/11/19 0613   • Vancomycin level, random, 1/12/2019 with am labs   Does not apply Continuous PRN Milad Leone MD, GILA       • zinc oxide 13 % cream   Topical BID PRN Milad Leone MD, GILA   1 application at 01/10/19 1568       Assessment/Plan     1.   Acute on chronic renal failure (CMS/HCC): It is likely secondary to hypotensive episodes as well as the use of Bactrim.   There is possibility that she may be taking nonsteroidals and other hemodynamic abnormality is causing the current problem.  There is no bloody around at this time who can help make the decisions or tell us what was done at home.  Finally I have came to know about that she has a blind sister both of them try to work together to take care of each other.  2.   Cellulitis of both lower extremities: Continue with the IV antibiotics.  She likely has an abscess on the foot that needs to be addressed  3.   Altered mental status: There is some improvement in her mental status.  4.   Anemia: Transfused as needed  5.   Bilateral edema of lower extremity: Her volume status looks fair I will go ahead and give her a good dose of diuretic and see if she'll start making urine and help with the potassium as well.  If not she may end up needing dialysis.  6.   GERD (gastroesophageal reflux disease)  7.   Hyperkalemia:   8.   Hypertension  9.   Hypothermia  10.   Hypothyroidism  11.   Type 2 diabetes mellitus with complication (CMS/[facility])  12.   Vitamin B12 deficiency    Plan:    · Continue with IV fluids which is more so secondary to her hypoglycemic episodes.   · I'll go ahead and give her 4 mg of Bumex every 6 hours ×2 doses to see if he can make her from oliguric to diuretic phase likely will help with electrolytes.  Otherwise she will end up requiring dialysis.  · No family members around details discussed with the nursing staff as well as hospitalist service.   · Surveillance labs.  · Further recommendations will depend on clinical course of the patient during the current hospitalization.    · I also discussed the details with the nursing staff.  · Rest as ordered.    Milad Leone MD, SHANIN  01/11/19  7:45 AM    Dictated utilizing Dragon dictation.

## 2019-01-11 NOTE — PHARMACY RECOMMENDATION
"Pharmacy to dose  Vancomycin and Piperacillin-tazobactam    Millicent Mckeon is a 58 y.o. female  165.1 cm (65\") (!) 167 kg (367 lb 9.6 oz) BMI = 61.17 kg/m2    Indication for use: skin and soft tissue infection (cellulitis both lower extremities)    Results from last 7 days   Lab Units  01/10/19   2112  01/10/19   2110   WBC 10*3/mm3  16.35*   --    CREATININE mg/dL   --   3.40*      Estimated Creatinine Clearance: 28.1 mL/min (A) (by C-G formula based on SCr of 3.4 mg/dL (H)).  Temp Readings from Last 1 Encounters:   01/10/19 98.2 °F (36.8 °C) (Oral)       Culture results  Microbiology Results (last 10 days)       ** No results found for the last 240 hours. **            Other Antimicrobials  N/A    Assessment/Plan  Vancomycin 2000 mg IVPB every 48 hours with random level 1/12/2019 with am labs.  Piperacillin-tazobactam 4.5 gm iv every 8 hours extended infusion Pharmacy will monitor renal function and adjust dose accordingly.    Keila Gaston RPH  01/11/19 12:15 AM    "

## 2019-01-11 NOTE — PROGRESS NOTES
Adult Nutrition  Assessment/PES    Patient Name:  Millicent Mckeon  YOB: 1960  MRN: [id number]  Admit Date:  1/10/2019    Assessment Date:  1/11/2019    Comments:  Rec #1: Consider advancing diet to Clear Liquids once medically appropriate. Nutritional supplement to be ordered Arginaid BID to promote wound healing once diet is advanced. Rec #2: Continue to monitor/replace electrolytes PRN. RD to follow pt. Consult RD PRN.       Reason for Assessment     Row Name 01/11/19 1156          Reason for Assessment    Reason For Assessment  diagnosis/disease state;identified at risk by screening criteria     Diagnosis  cardiac disease;diabetes diagnosis/complications;fluid status;other (see comments);metabolic state Cellulitis, DM2, AMS, Bilateral Edema, Hyperkalemia, Morbidly Obese     Identified At Risk by Screening Criteria  BMI;large or nonhealing wound, burn or pressure injury;MST SCORE 2+             Labs/Tests/Procedures/Meds     Row Name 01/11/19 1158          Labs/Procedures/Meds    Lab Results Reviewed  reviewed, pertinent     Lab Results Comments  Low: Na, Alb  High: Glu, K, BUN, Creat, Phos, Mg         Medications    Pertinent Medications Reviewed  reviewed         Physical Findings     Row Name 01/11/19 1158          Physical Findings    Overall Physical Appearance  overweight     Skin  non-healing wound(s)         Estimated/Assessed Needs     Row Name 01/11/19 1158          Calculation Measurements    Weight Used For Calculations  57.3 kg (126 lb 4.8 oz) IBW        Estimated/Assessed Needs    Additional Documentation  Fluid Requirements (Group);[place]-St. Jeor Equation (Group);Protein Requirements (Group);Calorie Requirements (Group)        Calorie Requirements    Estimated Calorie Need Method  [place]-St Jeor     Estimated Calorie Requirement Comment  [id number]        KCAL/KG    14 Kcal/Kg (kcal)  802.06     15 Kcal/Kg (kcal)  859.35     18 Kcal/Kg (kcal)  1031.22     20 Kcal/Kg (kcal)  1145.8      25 Kcal/Kg (kcal)  1432.25     30 Kcal/Kg (kcal)  1718.7     35 Kcal/Kg (kcal)  2005.15     40 Kcal/Kg (kcal)  2291.6     45 Kcal/Kg (kcal)  2578.05     50 Kcal/Kg (kcal)  2864.5        [place]-St. Jeor Equation    RMR ([place]-St. Jeor Equation)  1143.78        Protein Requirements    Est Protein Requirement Amount (gms/kg)  2.0 gm protein  gm     Estimated Protein Requirements (gms/day)  114.58        Fluid Requirements    Estimated Fluid Requirement Method  [place]ar Formula     [place]-Segar Method (over 20 kg)  2645.8         Nutrition Prescription Ordered     Row Name 01/11/19 1159          Nutrition Prescription PO    Current PO Diet  NPO         Evaluation of Received Nutrient/Fluid Intake     Row Name 01/11/19 1158          Calculation Measurements    Weight Used For Calculations  57.3 kg (126 lb 4.8 oz) IBW        PO Evaluation    Number of Days PO Intake Evaluated  Insufficient Data NPO x2 days         Evaluation of Prescribed Nutrient/Fluid Intake     Row Name 01/11/19 1158          Calculation Measurements    Weight Used For Calculations  57.3 kg (126 lb 4.8 oz) IBW             Problem/Interventions:  Problem 1     Row Name 01/11/19 1200          Nutrition Diagnoses Problem 1    Problem 1  Overweight/Obesity     Etiology (related to)  Factors Affecting Nutrition     Food Habit/Preferences  Large Meals     Signs/Symptoms (evidenced by)  BMI     BMI  Greater than 40         Problem 2     Row Name 01/11/19 1200          Nutrition Diagnoses Problem 2    Problem 2  Impaired Nutrient Utilization     Etiology (related to)  Medical Diagnosis     Endocrine  DM Type 2     Renal  CKD     Signs/Symptoms (evidenced by)  Biochemical     Specific Labs Noted  Glucose;BUN;Creatinine;Mg++;Phosphorus;K+         Problem 3     Row Name 01/11/19 1202          Nutrition Diagnoses Problem 3    Problem 3  Inadequate Intake/Infusion     Inadequate Intake Type  Oral     Macronutrient   Kcal;Carbohydrate;Fluid;Protein     Micronutrient  Vitamin;Mineral     Etiology (related to)  MNT for Treatment/Condition     Signs/Symptoms (evidenced by)  NPO             Intervention Goal     Row Name 01/11/19 1202          Intervention Goal    General  Meet nutritional needs for age/condition     PO  Meet estimated needs;Advance diet     Weight  No significant weight loss         Nutrition Intervention     Row Name 01/11/19 1203          Nutrition Intervention    RD/Tech Action  Follow Tx progress;Care plan reviewd;Recommend/ordered     Recommended/Ordered  Diet         Nutrition Prescription     Row Name 01/11/19 1203          Nutrition Prescription PO    PO Prescription  Begin/change diet;Begin/change supplement     Begin/Change Diet to  Clear Liquid     Supplement  Other (comment) Arginaid     Supplement Frequency  2 times a day     New PO Prescription Ordered?  No, recommended        Other Orders    Obtain Weight  Daily     Obtain Weight Ordered?  No, recommended         Education/Evaluation     Row Name 01/11/19 1203          Education    Education  Education not appropriate at this time     Please explain  Patient unresponsive        Monitor/Evaluation    Monitor  Per protocol;PO intake;I&O;Pertinent labs;Weight;Supplement intake;Skin status           Electronically signed by:  Starla William RD  01/11/19 12:04 PM

## 2019-01-11 NOTE — PLAN OF CARE
Problem: Fall Risk (Adult)  Goal: Identify Related Risk Factors and Signs and Symptoms   01/11/19 0043   Fall Risk (Adult)   Related Risk Factors (Fall Risk) confusion/agitation;homeostatic imbalance   Signs and Symptoms (Fall Risk) presence of risk factors

## 2019-01-11 NOTE — SIGNIFICANT NOTE
01/11/19 1125   Rehab Time/Intention   Evaluation Not Performed patient unavailable for evaluation  (Hold PT eval per RNKatey. Pt currently unresponsive and unable to actively participate with PT eval. PT will follow up with patient tomorrow and proceed with eval as tolerated and appropriate.)   Rehab Treatment   Discipline physical therapist

## 2019-01-11 NOTE — H&P
[facility] Medicine Services  HISTORY AND PHYSICAL    Primary Care Physician: Rosa Zamora MD    Subjective     Chief Complaint:  No chief complaint on file.      History of Present Illness:   Patient is 58-year-old morbidly obese white female who was seen at [facility] emergency room on January 7 for cellulitis of the lower extremity she was started on Keflex and Bactrim was discharged home.  She was found on the floor on 8 January and was brought to the emergency department by the EMS and was admitted to the hospital.  He was continued on the same medications she continued to worsen her renal function as well as potassium was slightly elevated along with hypoglycemia requiring IV medicine.  She was transferred to [facility] for higher level of care and being admitted to ICU.  She does not respond to verbal commands randomly does move her extremities and no meaningful review of system is possible.  I have reviewed labs/imaging/records from this hospitalization at [facility], including ER staff to establish a comprehensive understanding of this patient's clinical issues, as well as to establish plan of care appropriately.     Review of Systems   She is not responsive does not appear to have any fever, she is morbidly obese her ABGs from [facility] appears to be stable room air oxygenation is 95%.  She does have bilateral lower extremity redness and warmth to touch along with right heel that is black in color.  She has multiple other areas as well.  It does appear she was given IV iron at [facility].      Past Medical History:   Past Medical History:   Diagnosis Date   • Diabetes mellitus (CMS/HCC)    • Disease of thyroid gland    • GERD (gastroesophageal reflux disease)    • Hypertension        Past Surgical History:  Past Surgical History:   Procedure Laterality Date   • EYE SURGERY     • LEG SURGERY         Family History: family history  "includes Asthma in her mother; Hypertension in her father; Stroke in her father.    Social History:  reports that  has never smoked. she has never used smokeless tobacco. She reports that she does not drink alcohol or use drugs.    Medications:  Medications Prior to Admission   Medication Sig Dispense Refill Last Dose   • allopurinol (ZYLOPRIM) 300 MG tablet       • amLODIPine (NORVASC) 5 MG tablet   0    • esomeprazole (nexIUM) 40 MG capsule   0    • furosemide (LASIX) 20 MG tablet       • RA ASPIRIN EC 81 MG EC tablet   0    • RA VITAMIN C 500 MG tablet   0    • RA VITAMIN D-3 2000 units capsule   0    • simvastatin (ZOCOR) 20 MG tablet   0    • TRADJENTA 5 MG tablet tablet           Allergies:  Allergies   Allergen Reactions   • Metformin And Related Swelling     HA, diarrhea, throat swelling         Objective     Physical Exam:  Vital Signs: /65   Pulse 89   Temp 98.2 °F (36.8 °C) (Oral)   Ht 165.1 cm (65\")   Wt (!) 167 kg (367 lb 9.6 oz)   SpO2 95%   BMI 61.17 kg/m²      Physical Exam:     General Appearance:   She does not appear in any acute distress.     Head:   Normocephalic, without obvious abnormality, atraumatic.     Eyes:       Normal, conjunctivae and sclerae, no icterus, no pallor, corneas clear, PERRLA        Throat:   Oral mucosa dry      Neck:  No adenopathy, supple, trachea midline, no thyromegaly, no carotid bruit, no JVD      Back:   No CVA tenderness on Percussion.     Lungs:    Clear to auscultation and fair air movement noted.      Heart:   Regular rhythm and normal rate, normal S1 and S2.       Abdomen:   Obese. Normal bowel sounds, no masses, no organomegaly, soft non-tender, non-distended, no guarding, no rebound tenderness        Extremities:  Moves all extremities, 3-4+ edema, no cyanosis, positive redness of both lower extremities.  Right heel has a black spot as well as tip of the second toe.     Pulses:  Pulses palpable and equal bilaterally but weak.     Skin:  No " bleeding, bruising or rash        Neurologic:  Unable to examine secondary to no interaction          Results Review:  Lab Results (last 7 days)     ** No results found for the last 168 hours. **        Imaging Results (last 72 hours)     ** No results found for the last 72 hours. **              I have personally reviewed and interpreted available lab data, radiology studies and ECG obtained at time of admission.     Assessment / Plan     Assessment/Problem List:     Cellulitis of both lower extremities    Altered mental status    Anemia    Bilateral edema of lower extremity    Cellulitis of lower extremity    Acute on chronic renal failure (CMS/[facility])    GERD (gastroesophageal reflux disease)    Hyperkalemia    Hypertension    Hypothermia    Hypothyroidism    Type 2 diabetes mellitus with complication (CMS/[facility])    Vitamin B12 deficiency          Plan:  · Patient was transferred from [facility] because of needing higher level of care is being admitted to the ICU.  She does not have any meaningful interaction.  · I'll go ahead and start her on Zosyn and vancomycin to cover her cellulitis.  · Repeat set of labs.  It is likely that her hyperkalemia secondary to Bactrim and they have some worsening of renal function from it as well.  She will need to be watched closely.  Send urine studies.  · Continue with IV fluids for the time being will need to be reassessed again on day-to-day basis, she has good oxygenation on room air.  Last set of ABGs were also fairly good.  · I'll keep her nothing by mouth until she is more awake.  · Last hemoglobin was noted to be 7.4 if it drops any further will need to be transfused type and screen has been done.  · No family or relatives available at this time.    · Further recommendations will depend on clinical course of the patient during the current hospitalization.    · I also discussed the details with the nursing staff.  She does not have any living will available none  of the family members are available as well we'll keep her as a full code.    Rest as ordered.    Anticipated stay is greater than 2 midnights.    Milad Leone MD, SHANIN 01/10/19 9:40 PM    Dictated using Dragon.

## 2019-01-11 NOTE — SIGNIFICANT NOTE
01/11/19 1123   Rehab Time/Intention   Evaluation Not Performed patient unavailable for evaluation  (Pt not responsive at this time.  Will follow up with pt once she is medically appropriate.)   Rehab Treatment   Discipline occupational therapist

## 2019-01-11 NOTE — PROGRESS NOTES
Continued Stay Note  SRINI Hdz     Patient Name: Millicent Mckeon  MRN: [id number]  Today's Date: 1/11/2019    Admit Date: 1/10/2019    Discharge Plan     Row Name 01/11/19 1152       Plan    Plan Social Service discharge planning    Plan Comments Pt unresponsive and not appropriate for discharge planning at this time. Will continue to follow for discharge planning.    Final Note SW/CM will continue to follow        Discharge Codes    No documentation.             HERB Cordoba  11:54 AM  01/11/19

## 2019-01-12 ENCOUNTER — APPOINTMENT (OUTPATIENT)
Dept: GENERAL RADIOLOGY | Facility: HOSPITAL | Age: 59
End: 2019-01-12

## 2019-01-12 LAB
ABO + RH BLD: NORMAL
ANION GAP SERPL CALCULATED.3IONS-SCNC: 15.3 MMOL/L (ref 10–20)
ANION GAP SERPL CALCULATED.3IONS-SCNC: 18.5 MMOL/L (ref 10–20)
ANISOCYTOSIS BLD QL: ABNORMAL
BH BB BLOOD EXPIRATION DATE: NORMAL
BH BB BLOOD TYPE BARCODE: 2800
BH BB DISPENSE STATUS: NORMAL
BH BB PRODUCT CODE: NORMAL
BH BB UNIT NUMBER: NORMAL
BUN BLD-MCNC: 60 MG/DL (ref 7–20)
BUN BLD-MCNC: 64 MG/DL (ref 7–20)
BUN/CREAT SERPL: 16.2 (ref 7.1–23.5)
BUN/CREAT SERPL: 16.8 (ref 7.1–23.5)
CALCIUM SPEC-SCNC: 7.9 MG/DL (ref 8.4–10.2)
CALCIUM SPEC-SCNC: 8 MG/DL (ref 8.4–10.2)
CHLORIDE SERPL-SCNC: 108 MMOL/L (ref 98–107)
CHLORIDE SERPL-SCNC: 108 MMOL/L (ref 98–107)
CK SERPL-CCNC: 40 U/L (ref 30–170)
CO2 SERPL-SCNC: 17 MMOL/L (ref 26–30)
CO2 SERPL-SCNC: 19 MMOL/L (ref 26–30)
CREAT BLD-MCNC: 3.7 MG/DL (ref 0.6–1.3)
CREAT BLD-MCNC: 3.8 MG/DL (ref 0.6–1.3)
CROSSMATCH INTERPRETATION: NORMAL
D-LACTATE SERPL-SCNC: 1 MMOL/L (ref 0.5–2)
DEPRECATED RDW RBC AUTO: 57 FL (ref 37–54)
DOHLE BODIES: PRESENT
ERYTHROCYTE [DISTWIDTH] IN BLOOD BY AUTOMATED COUNT: 20 % (ref 11.5–14.5)
GFR SERPL CREATININE-BSD FRML MDRD: 12 ML/MIN/1.73
GFR SERPL CREATININE-BSD FRML MDRD: 12 ML/MIN/1.73
GFR SERPL CREATININE-BSD FRML MDRD: 15 ML/MIN/1.73
GFR SERPL CREATININE-BSD FRML MDRD: 15 ML/MIN/1.73
GLUCOSE BLD-MCNC: 190 MG/DL (ref 74–98)
GLUCOSE BLD-MCNC: 216 MG/DL (ref 74–98)
GLUCOSE BLDC GLUCOMTR-MCNC: 201 MG/DL (ref 70–130)
GLUCOSE BLDC GLUCOMTR-MCNC: 214 MG/DL (ref 70–130)
GLUCOSE BLDC GLUCOMTR-MCNC: 218 MG/DL (ref 70–130)
GLUCOSE BLDC GLUCOMTR-MCNC: 219 MG/DL (ref 70–130)
HCT VFR BLD AUTO: 25.8 % (ref 37–47)
HGB BLD-MCNC: 8.2 G/DL (ref 12–16)
LDH SERPL-CCNC: 545 U/L (ref 313–618)
LYMPHOCYTES # BLD MANUAL: 1.4 10*3/MM3 (ref 0.6–3.4)
LYMPHOCYTES NFR BLD MANUAL: 3 % (ref 0–12)
LYMPHOCYTES NFR BLD MANUAL: 7 % (ref 10–50)
MCH RBC QN AUTO: 25.2 PG (ref 27–31)
MCHC RBC AUTO-ENTMCNC: 31.8 G/DL (ref 30–37)
MCV RBC AUTO: 79.1 FL (ref 81–99)
METAMYELOCYTES NFR BLD MANUAL: 2 % (ref 0–0)
MONOCYTES # BLD AUTO: 0.6 10*3/MM3 (ref 0–0.9)
NEUTROPHILS # BLD AUTO: 17.66 10*3/MM3 (ref 2–6.9)
NEUTROPHILS NFR BLD MANUAL: 84 % (ref 37–80)
NEUTS BAND NFR BLD MANUAL: 4 % (ref 0–6)
NT-PROBNP SERPL-MCNC: ABNORMAL PG/ML (ref 0–125)
PHOSPHATE SERPL-MCNC: 7.9 MG/DL (ref 2.5–4.5)
PLATELET # BLD AUTO: 133 10*3/MM3 (ref 130–400)
PMV BLD AUTO: 11.1 FL (ref 6–12)
POIKILOCYTOSIS BLD QL SMEAR: ABNORMAL
POLYCHROMASIA BLD QL SMEAR: ABNORMAL
POTASSIUM BLD-SCNC: 5.3 MMOL/L (ref 3.5–5.1)
POTASSIUM BLD-SCNC: 5.5 MMOL/L (ref 3.5–5.1)
PREALB SERPL-MCNC: 7.1 MG/DL (ref 17.6–36)
RBC # BLD AUTO: 3.26 10*6/MM3 (ref 4.2–5.4)
SCAN SLIDE: NORMAL
SMALL PLATELETS BLD QL SMEAR: ADEQUATE
SODIUM BLD-SCNC: 137 MMOL/L (ref 137–145)
SODIUM BLD-SCNC: 138 MMOL/L (ref 137–145)
T4 FREE SERPL-MCNC: 0.82 NG/DL (ref 0.78–2.19)
TOXIC GRANULATION: ABNORMAL
TSH SERPL DL<=0.05 MIU/L-ACNC: 12.1 MIU/ML (ref 0.47–4.68)
UNIT  ABO: NORMAL
UNIT  RH: NORMAL
URATE SERPL-MCNC: 4.4 MG/DL (ref 2.5–8.5)
VANCOMYCIN SERPL-MCNC: 23.15 MCG/ML (ref 5–40)
WBC NRBC COR # BLD: 20.07 10*3/MM3 (ref 4.8–10.8)

## 2019-01-12 PROCEDURE — 25010000002 HYDRALAZINE PER 20 MG: Performed by: HOSPITALIST

## 2019-01-12 PROCEDURE — 84443 ASSAY THYROID STIM HORMONE: CPT | Performed by: HOSPITALIST

## 2019-01-12 PROCEDURE — 80048 BASIC METABOLIC PNL TOTAL CA: CPT | Performed by: HOSPITALIST

## 2019-01-12 PROCEDURE — 80202 ASSAY OF VANCOMYCIN: CPT | Performed by: INTERNAL MEDICINE

## 2019-01-12 PROCEDURE — 74018 RADEX ABDOMEN 1 VIEW: CPT

## 2019-01-12 PROCEDURE — 84134 ASSAY OF PREALBUMIN: CPT | Performed by: HOSPITALIST

## 2019-01-12 PROCEDURE — 83605 ASSAY OF LACTIC ACID: CPT | Performed by: HOSPITALIST

## 2019-01-12 PROCEDURE — 84550 ASSAY OF BLOOD/URIC ACID: CPT | Performed by: HOSPITALIST

## 2019-01-12 PROCEDURE — 80048 BASIC METABOLIC PNL TOTAL CA: CPT | Performed by: INTERNAL MEDICINE

## 2019-01-12 PROCEDURE — 25010000002 PIPERACILLIN SOD-TAZOBACTAM PER 1 G: Performed by: INTERNAL MEDICINE

## 2019-01-12 PROCEDURE — 63710000001 INSULIN ASPART PER 5 UNITS: Performed by: INTERNAL MEDICINE

## 2019-01-12 PROCEDURE — 82550 ASSAY OF CK (CPK): CPT | Performed by: HOSPITALIST

## 2019-01-12 PROCEDURE — 83615 LACTATE (LD) (LDH) ENZYME: CPT | Performed by: HOSPITALIST

## 2019-01-12 PROCEDURE — 85025 COMPLETE CBC W/AUTO DIFF WBC: CPT | Performed by: HOSPITALIST

## 2019-01-12 PROCEDURE — 84100 ASSAY OF PHOSPHORUS: CPT | Performed by: HOSPITALIST

## 2019-01-12 PROCEDURE — 83880 ASSAY OF NATRIURETIC PEPTIDE: CPT | Performed by: HOSPITALIST

## 2019-01-12 PROCEDURE — 25010000002 DAPTOMYCIN PER 1 MG: Performed by: HOSPITALIST

## 2019-01-12 PROCEDURE — 82962 GLUCOSE BLOOD TEST: CPT

## 2019-01-12 PROCEDURE — 25010000002 HYDROCORTISONE SODIUM SUCCINATE 100 MG RECONSTITUTED SOLUTION: Performed by: HOSPITALIST

## 2019-01-12 PROCEDURE — 36415 COLL VENOUS BLD VENIPUNCTURE: CPT | Performed by: HOSPITALIST

## 2019-01-12 PROCEDURE — 99232 SBSQ HOSP IP/OBS MODERATE 35: CPT | Performed by: HOSPITALIST

## 2019-01-12 PROCEDURE — 84439 ASSAY OF FREE THYROXINE: CPT | Performed by: INTERNAL MEDICINE

## 2019-01-12 PROCEDURE — 85007 BL SMEAR W/DIFF WBC COUNT: CPT | Performed by: HOSPITALIST

## 2019-01-12 PROCEDURE — 25010000002 HEPARIN (PORCINE) PER 1000 UNITS: Performed by: HOSPITALIST

## 2019-01-12 PROCEDURE — 25010000002 HYDROCORTISONE SODIUM SUCCINATE 100 MG RECONSTITUTED SOLUTION: Performed by: INTERNAL MEDICINE

## 2019-01-12 PROCEDURE — 82306 VITAMIN D 25 HYDROXY: CPT | Performed by: HOSPITALIST

## 2019-01-12 RX ORDER — HYDRALAZINE HYDROCHLORIDE 20 MG/ML
10 INJECTION INTRAMUSCULAR; INTRAVENOUS EVERY 6 HOURS PRN
Status: DISCONTINUED | OUTPATIENT
Start: 2019-01-12 | End: 2019-01-22 | Stop reason: HOSPADM

## 2019-01-12 RX ADMIN — DAPTOMYCIN 600 MG: 500 INJECTION, POWDER, LYOPHILIZED, FOR SOLUTION INTRAVENOUS at 14:00

## 2019-01-12 RX ADMIN — HYDROCORTISONE SODIUM SUCCINATE 50 MG: 100 INJECTION, POWDER, FOR SOLUTION INTRAMUSCULAR; INTRAVENOUS at 08:49

## 2019-01-12 RX ADMIN — INSULIN ASPART 3 UNITS: 100 INJECTION, SOLUTION INTRAVENOUS; SUBCUTANEOUS at 13:07

## 2019-01-12 RX ADMIN — BUMETANIDE 1 MG/HR: 0.25 INJECTION INTRAMUSCULAR; INTRAVENOUS at 12:26

## 2019-01-12 RX ADMIN — TAZOBACTAM SODIUM AND PIPERACILLIN SODIUM 4.5 G: 500; 4 INJECTION, SOLUTION INTRAVENOUS at 15:30

## 2019-01-12 RX ADMIN — HEPARIN SODIUM 5000 UNITS: 5000 INJECTION, SOLUTION INTRAVENOUS; SUBCUTANEOUS at 06:45

## 2019-01-12 RX ADMIN — BUMETANIDE 1 MG/HR: 0.25 INJECTION INTRAMUSCULAR; INTRAVENOUS at 22:30

## 2019-01-12 RX ADMIN — HYDROCORTISONE SODIUM SUCCINATE 50 MG: 100 INJECTION, POWDER, FOR SOLUTION INTRAMUSCULAR; INTRAVENOUS at 17:34

## 2019-01-12 RX ADMIN — SODIUM CHLORIDE, PRESERVATIVE FREE 3 ML: 5 INJECTION INTRAVENOUS at 08:50

## 2019-01-12 RX ADMIN — PANTOPRAZOLE SODIUM 40 MG: 40 INJECTION, POWDER, FOR SOLUTION INTRAVENOUS at 06:45

## 2019-01-12 RX ADMIN — INSULIN ASPART 3 UNITS: 100 INJECTION, SOLUTION INTRAVENOUS; SUBCUTANEOUS at 17:32

## 2019-01-12 RX ADMIN — HEPARIN SODIUM 5000 UNITS: 5000 INJECTION, SOLUTION INTRAVENOUS; SUBCUTANEOUS at 13:07

## 2019-01-12 RX ADMIN — INSULIN ASPART 3 UNITS: 100 INJECTION, SOLUTION INTRAVENOUS; SUBCUTANEOUS at 06:45

## 2019-01-12 RX ADMIN — TAZOBACTAM SODIUM AND PIPERACILLIN SODIUM 4.5 G: 500; 4 INJECTION, SOLUTION INTRAVENOUS at 22:30

## 2019-01-12 RX ADMIN — TAZOBACTAM SODIUM AND PIPERACILLIN SODIUM 4.5 G: 500; 4 INJECTION, SOLUTION INTRAVENOUS at 06:45

## 2019-01-12 RX ADMIN — Medication 1 APPLICATION: at 04:14

## 2019-01-12 RX ADMIN — HYDROCORTISONE SODIUM SUCCINATE 50 MG: 100 INJECTION, POWDER, FOR SOLUTION INTRAMUSCULAR; INTRAVENOUS at 03:31

## 2019-01-12 RX ADMIN — HYDRALAZINE HYDROCHLORIDE 10 MG: 20 INJECTION INTRAMUSCULAR; INTRAVENOUS at 18:46

## 2019-01-12 RX ADMIN — FAMOTIDINE 20 MG: 10 INJECTION, SOLUTION INTRAVENOUS at 08:49

## 2019-01-12 RX ADMIN — HEPARIN SODIUM 5000 UNITS: 5000 INJECTION, SOLUTION INTRAVENOUS; SUBCUTANEOUS at 22:30

## 2019-01-12 NOTE — SIGNIFICANT NOTE
01/12/19 1148   Rehab Time/Intention   Evaluation Not Performed patient unavailable for evaluation  (pt not able to actively participate with PT. unable to follow commands. nursing to cont with PROM. will attempt again tomorrow)   Rehab Treatment   Discipline physical therapist

## 2019-01-12 NOTE — PLAN OF CARE
Problem: Skin Injury Risk (Adult)  Goal: Skin Health and Integrity   01/12/19 0800   Skin Injury Risk (Adult)   Skin Health and Integrity making progress toward outcome  (Pt;s skin  and barriers applied to open areas.)

## 2019-01-12 NOTE — PROGRESS NOTES
[facility]    Name: Millicent Mckeon, 58 y.o. female  MRN: [id number], : 1960   Date of Admission: 1/10/2019   Date of Service: 19   PCP: Rosa Zamora MD     Hospital Course:   Ms. Mckeon is a 59 yo F with h/o DM2, hypothyroidism, GERD, HTN, morbid obesity (BMI 61), who was admitted on 1/10/19 from & for altered mental status. Per my discussion with the provider there patient was found on the floor at home covered in stool. She was admitted for nausea, extremity weakness and fall on the floor. Per discussion with ICU RN, she lives with her sister who is blind.      At OSH, she was hypothermic and required MARCIA hugger and warm IV fluids for normalization of temperature. She was started on rocephin and vancomycin. Abnormal labs included hgb 7.8, platelet 118; BUN/CR 46/2.7 (baseline creatinine 1.7), Aphos 244, TSH 17, alessandro 554, iron low, TIBC low she was started on IV synthroid and solucortef.  Imaging at OSH included CT T/A/P which demonstrated paratracheal, retroperitoneal and inguinal lymph nodes. Patient had been referred to  later this month for biopsy.    Consultants: Dr. Leone (nephrology)   Procedures: none    Antibiotics:   Vancomycin d2  Zosyn d2    Micro:    BCx x 2: ngtd   BCx x 2: ngtd  1/10 right foot culture: 3+ GPC in pairs, 3+ GNR  1/10 MRSA screen: in process  1/10 VRE culture: in process  1/10 Acinetobacter: in process  1/10 UCx: in process    ----------------------------------------------------------------------------------------------------------------------------------------------------------------  Subjective   Chief Complaint: f/u altered mental status, UTI     Subjective:   Patient seen and examined this morning.  Tele reviewed. Events from last night noted. Discussed with JANAE Del Toro. She is nonverbal, opens eyes randomly and stares.    Review of Systems:   UTO as AMS    Objective   Objective:     Intake/Output Summary  (Last 24 hours) at 1/11/2019 2009  Last data filed at 1/11/2019 1736  Gross per 24 hour   Intake 3694 ml   Output 900 ml   Net 2794 ml      SpO2: 98 %     Physical Examination:   Vitals:    01/11/19 1738 01/11/19 1745 01/11/19 1801 01/11/19 1835   BP:   142/75 137/74   BP Location:       Patient Position:       Pulse: 79 79 80    Resp:    16   Temp:    94.8 °F (34.9 °C)   TempSrc:    Rectal   SpO2: 99% 98% 98%    Weight:       Height:          General:  Morbidly obese female; lying in bed; no acute distress  Heart:   RRR, S1 S2 normal, no m/r/g  Lungs:   CTA kenny/laterally, no wheezes  Abdomen:  Morbidly bbese abdomen with scattered dime-sized skin tears; soft, NT, ND, +BS  Extremities: B/l lower extremity pachydermic thickening below knees; right heel ulcer very malodorous wet/dry gangrene  Neuro:  Opens eyes, but not purposeful; no facial droop  Psych:  appropriate mood  Skin:  No bleeding, bruising    Pertinent laboratory and radiology data were reviewed:  Microbiology Results (last 10 days)     Procedure Component Value - Date/Time    Blood Culture With LYDIA - Blood, Arm, Left [[id number]] Collected:  01/11/19 0744    Lab Status:  Preliminary result Specimen:  Blood from Arm, Left Updated:  01/11/19 2000     Blood Culture No growth at less than 24 hours    Blood Culture With LYDIA - Blood, Arm, Right [[id number]] Collected:  01/11/19 0735    Lab Status:  Preliminary result Specimen:  Blood from Arm, Right Updated:  01/11/19 2000     Blood Culture No growth at less than 24 hours    Hardware / Foreign Body Culture - Hardware / Foreign Body, Foot, Right [[id number]] Collected:  01/10/19 2337    Lab Status:  Preliminary result Specimen:  Hardware / Foreign Body from Foot, Right Updated:  01/11/19 0114     Gram Stain Moderate (3+) Gram positive cocci in pairs      Moderate (3+) Gram negative bacilli      Rare (1+) WBCs seen          Medications Reviewed:     bumetanide 4 mg Intravenous Q6H   heparin (porcine) 5,000 Units  Subcutaneous Q8H   hydrocortisone sodium succinate 50 mg Intravenous Q6H   insulin aspart 0-7 Units Subcutaneous 4x Daily AC & at Bedtime   levothyroxine 50 mcg Oral Q AM   pantoprazole 40 mg Intravenous Q AM   piperacillin-tazobactam 4.5 g Intravenous Q8H   sodium chloride 3 mL Intravenous Q12H   vancomycin 2,000 mg Intravenous Q48H        Assessment /Plan   Assessment/Plan:   1. Sepsis (leukocytosis + hypothermia + leg cellulitis/UTI/right heel necrotic ulcer)   · Monitor on telemetry in ICU  · Requested urine culture add-on in lab  · Vancomycin and zosyn for now  · F/u MRSA screen  · Check CT head  · Check BCx x 2 with LYDIA  · MARCIA hugger and fluid warmer  · Place rectal temp probe    2. Acute on chronic renal failure with hyperkalemia.   · Contacted Dr. Leone for consult.   · Given scattered lymphadenopathy and high suspicion for underlying malignancy, tumor lysis is also in differential, will check uric acid, LDH    3. Antibiotics as aboveHypothyroidism s/p IV synthroid at OSH, continue on PO synthroid.   · TSH improved to 12. AM TSH.   · solucortef initiated at OSH, unable to reliably check cortisol level.    4. Acute metabolic encephalopathy, multifactorial: sepsis + hypothyroidism. Check 25-hydroxy vitamin D, BNP, TSH, PO4, pre-albumin  5. Right heel ulcer. Check venous and arterial dopplers F/u wound cultures. Contacted Dr. Rueda for consult  6. Microcytic anemia of chronic disease. Transfuse 1 unit PRBC.  7. Bilateral lower extremity cellulitis  8. Acute UTI, had clay catheter placed at OSH. Spoke to micro to add-on cultures. Antibiotics as above  9. Morbid obesity. Body mass index is 61.17 kg/m². complicates all aspects of care    FEN: NPO until more alert and following commands; IV fluids  DVT Prophylaxis: change lovenox to heparin due to renal issues  PUD Prophylaxis: start pepcid as on solucortef    Reason for continued hospitalization: above  Disposition: ICU  Discussed with: JANAE Del Toro and Dr. Rueda,  no family at bedside    Liz Gregg MD   8:09 PM on 1/11/2019

## 2019-01-12 NOTE — PROGRESS NOTES
"Nephrology Progress Note.    LOS: 2 days    Patient Care Team:  Rosa Zamora MD as PCP - General (Internal Medicine)  Geremias Shepherd MD as Consulting Physician (General Surgery)    Chief Complaint:  No chief complaint on file.      Subjective     Follow up for FREDDY and related issues.    Interval History:     Patient Complaints: none    Patient seen and examined this morning.  Events from last night noted.  She still minimally responsive.  There is significant edema.   She still has no meaningful interaction.      Review of Systems:    Patient is unresponsive and sedated no meaningful review of system is possible.      Objective     Vital Signs  /77   Pulse 89   Temp 98.6 °F (37 °C) (Rectal)   Resp 17   Ht 165.1 cm (65\")   Wt (!) 170 kg (374 lb 5 oz)   SpO2 96%   BMI 62.29 kg/m²       No intake/output data recorded.    Intake/Output Summary (Last 24 hours) at 1/12/2019 1013  Last data filed at 1/12/2019 0500  Gross per 24 hour   Intake 3650 ml   Output 1250 ml   Net 2400 ml       Physical Exam:    General Appearance: no acute distress,   HEENT: Oral mucosa dry, extra occular movements intact. Sclera clear.  Skin: Warm and dry  Neck: supple, no JVD, trachea midline  Lungs:Chest shape is normal. Breath sounds heard bilaterally equally.  No wheezing   Heart: regular rate and rhythm. normal S1 and S2, no S3, no rub, peripheral pulses weak but palpable.  Abdomen: Obese, soft, non-tender,  present bowel sounds to auscultation  : no palpable bladder.  Extremities: 2 to 3 + edema, no cyanosis or clubbing.   Neuro: No meaningful interaction   Results Review:    Results from last 7 days   Lab Units  01/12/19   0431  01/11/19   1157  01/11/19   0419  01/10/19   2110   SODIUM mmol/L  138  136*  135*  134*   POTASSIUM mmol/L  5.5*  5.6*  5.7*  5.5*   CHLORIDE mmol/L  108*  107  106  106   CO2 mmol/L  17.0*  21.0*  19.0*  20.0*   BUN mg/dL  60*  54*  50*  48*   CREATININE mg/dL  3.70*  3.50*  3.50*  3.40* "   CALCIUM mg/dL  7.9*  7.9*  8.0*  8.0*   BILIRUBIN mg/dL   --    --   0.4  0.4   ALK PHOS U/L   --    --   305*  276*   ALT (SGPT) U/L   --    --   37  38   AST (SGOT) U/L   --    --   28  31   GLUCOSE mg/dL  190*  169*  139*  79       Estimated Creatinine Clearance: 26 mL/min (A) (by C-G formula based on SCr of 3.7 mg/dL (H)).    Results from last 7 days   Lab Units  01/12/19 0431 01/11/19   0419  01/10/19   2110   MAGNESIUM mg/dL   --   2.6*  2.5*   PHOSPHORUS mg/dL  7.9*  7.4*  6.8*       Results from last 7 days   Lab Units  01/12/19 0431   URIC ACID mg/dL  4.4       Results from last 7 days   Lab Units  01/12/19   0431  01/11/19   0419  01/10/19   2112   WBC 10*3/mm3  20.07*  18.35*  16.35*   HEMOGLOBIN g/dL  8.2*  7.6*  7.4*   PLATELETS 10*3/mm3  133  142  140       Results from last 7 days   Lab Units  01/10/19   2112   INR   1.57*         Imaging Results (last 24 hours)     Procedure Component Value Units Date/Time    US Arterial Doppler Lower Extremity Bilateral [[id number]] Collected:  01/11/19 1203     Updated:  01/11/19 1207    Narrative:       PROCEDURE: US ARTERIAL DOPPLER LOWER EXTREMITY  BILATERAL-     HISTORY: ulcer evaluate flow     PROCEDURE: Segmental pressures with Doppler waveform evaluation of the  lower extremities were performed bilaterally.     FINDINGS:      RIGHT LOWER EXTREMITY.      Velocities cm/sec :      CFA: 115  SFA Mid: 124   SFA Dist: 176  POP: 103  PT: 56  JUAN PABLO: 83  Waveforms are monophasic.           LEFT LOWER EXTREMITY.      Velocities cm/sec :      CFA: 150  SFA Mid: 193   SFA Dist: 153  POP: 47  PT: 54  JUAN PABLO: 56  Waveforms are monophasic.          Impression:       No focal stenosis identified.     This report was finalized on 1/11/2019 12:05 PM by Lety Valiente M.D..    US Venous Doppler Lower Extremity Bilateral (duplex) [[id number]] Collected:  01/11/19 1146     Updated:  01/11/19 3585    Narrative:       PROCEDURE: US VENOUS DOPPLER LOWER EXTREMITY BILATERAL  (DUPLEX)-     HISTORY: ulcer evaluate flow     PROCEDURE: Multiple transverse and longitudinal scans were performed of  both femoropopliteal deep venous system, with augmentation and  compression maneuvers.     FINDINGS: Normal phasic flow was noted in the visualized deep venous  system. No intraluminal increased echogenicity is noted to suggest  thrombus. There is normal compression and augmentation of the venous  structures.  No abnormal venous collaterals are seen.       Impression:       No evidence of deep venous thrombosis.     This report was finalized on 1/11/2019 11:47 AM by Lety Valeinte M.D..    CT Head Without Contrast [[id number]] Updated:  01/11/19 1038          famotidine 20 mg Intravenous Daily   heparin (porcine) 5,000 Units Subcutaneous Q8H   hydrocortisone sodium succinate 50 mg Intravenous Q6H   insulin aspart 0-7 Units Subcutaneous 4x Daily AC & at Bedtime   levothyroxine 50 mcg Oral Q AM   pantoprazole 40 mg Intravenous Q AM   piperacillin-tazobactam 4.5 g Intravenous Q8H   sodium chloride 3 mL Intravenous Q12H   vancomycin 2,000 mg Intravenous Q48H       Pharmacy to dose vancomycin     Pharmacy to Dose Zosyn     Sodium chloride 150 mL/hr Last Rate: 150 mL/hr (01/11/19 1640)       Medication Review:   Current Facility-Administered Medications   Medication Dose Route Frequency Provider Last Rate Last Dose   • acetaminophen (TYLENOL) tablet 650 mg  650 mg Oral Q4H PRN Milad Leone MD, FASN       • dextrose (D50W) 25 g/ 50mL Intravenous Solution 25 g  25 g Intravenous Q15 Min PRN Milad Leone MD, FASN       • dextrose (GLUTOSE) oral gel 1 tube  1 tube Oral Q15 Min PRN Milad Leone MD, FASN       • famotidine (PEPCID) injection 20 mg  20 mg Intravenous Daily Liz Gregg MD   20 mg at 01/12/19 0849   • glucagon (human recombinant) (GLUCAGEN DIAGNOSTIC) injection 1 mg  1 mg Subcutaneous PRN Milad Leone MD, FASN       • heparin (porcine) 5000 UNIT/ML injection 5,000 Units   5,000 Units Subcutaneous Q8H Liz Gregg MD   5,000 Units at 01/12/19 0645   • hydrocortisone sodium succinate (Solu-CORTEF) injection 50 mg  50 mg Intravenous Q6H Milad Leone MD, FASN   50 mg at 01/12/19 0849   • insulin aspart (novoLOG) injection 0-7 Units  0-7 Units Subcutaneous 4x Daily AC & at Bedtime Milad Leone MD, FASN   3 Units at 01/12/19 0645   • levothyroxine (SYNTHROID, LEVOTHROID) tablet 50 mcg  50 mcg Oral Q AM Milad Leone MD, FASN       • Morphine sulfate (PF) injection 2 mg  2 mg Intravenous Q4H PRN Milad Leone MD, FASN        And   • naloxone (NARCAN) injection 0.4 mg  0.4 mg Intravenous Q5 Min PRN Milad Leone MD, FASN       • ondansetron (ZOFRAN) tablet 4 mg  4 mg Oral Q6H PRN Milad Leone MD, FASN        Or   • ondansetron ODT (ZOFRAN-ODT) disintegrating tablet 4 mg  4 mg Oral Q6H PRN Milad Leone MD, FASN        Or   • ondansetron (ZOFRAN) injection 4 mg  4 mg Intravenous Q6H PRN Milad Leone MD, FASN       • pantoprazole (PROTONIX) injection 40 mg  40 mg Intravenous Q AM Milad Leone MD, FASN   40 mg at 01/12/19 0645   • Pharmacy to dose vancomycin   Does not apply Continuous PRN Milad Leone MD, FASN       • Pharmacy to Dose Zosyn   Does not apply Continuous PRN Milad Leone MD, FASN       • piperacillin-tazobactam (ZOSYN) 4.5 g in iso-osmotic dextrose 100 mL IVPB (premix)  4.5 g Intravenous Q8H Milad Leone MD, FASN   4.5 g at 01/12/19 0645   • sodium chloride 0.9 % flush 1-10 mL  1-10 mL Intravenous PRN Milad Leone MD, FASN       • sodium chloride 0.9 % flush 3 mL  3 mL Intravenous Q12H Milad Leone MD, GILA   3 mL at 01/12/19 0850   • Sodium chloride 0.9 % infusion  150 mL/hr Intravenous Continuous Liz Gregg  mL/hr at 01/11/19 1640 150 mL/hr at 01/11/19 1640   • vancomycin 2000 mg in sodium chloride 0.9% 500 mL IVPB  2,000 mg Intravenous Q48H Milad Leone MD, FASN   2,000 mg at 01/11/19 0613   • zinc oxide 13 %  cream   Topical BID Milad Bianchi MD, FASN   1 application at 01/12/19 0414       Assessment/Plan     1.   Acute on chronic renal failure (CMS/HCC): It is likely secondary to hypotensive episodes as well as the use of Bactrim.  There is possibility that she may be taking nonsteroidals and other hemodynamic abnormality is causing the current problem.        2.   Cellulitis of both lower extremities: Continue with the IV antibiotics.    3.   Altered mental status: There is some improvement in her mental status.  4.   Anemia: Transfused as needed  5.   Bilateral edema of lower extremity:   6.   GERD (gastroesophageal reflux disease)  7.   Hyperkalemia:   8.   Hypertension  9.   Hypothermia  10.   Hypothyroidism  11.   Type 2 diabetes mellitus with complication (CMS/HCC)  12.   Vitamin B12 deficiency    Plan:  I'm going to start her on a Bumex drip 1 mg per hour due to her volume status and her hyperkalemia.  We'll repeat lab in the afternoon and follow her very closely  Patient might end up on dialysis depending on her clinical course  Discussed with the nurse  > 35 min spent in direct care     Benjamin Cruz MD  01/12/19  10:13 AM    Dictated utilizing Dragon dictation.

## 2019-01-12 NOTE — PROGRESS NOTES
[facility]    Name: Millicent Mckeon, 58 y.o. female  MRN: [id number], : 1960   Date of Admission: 1/10/2019   Date of Service: 19   PCP: Rosa Zamora MD     Hospital Course:   Ms. Mckeon is a 57 yo F with h/o DM2, hypothyroidism, GERD, HTN, morbid obesity (BMI 61), who was admitted on 1/10/19 from & for altered mental status. Per my discussion with the provider there patient was found on the floor at home covered in stool. She was admitted for nausea, extremity weakness and fall on the floor. Per discussion with ICU RN, she lives with her sister who is blind.      At OSH, she was hypothermic and required Sandra hugger and warm IV fluids for normalization of temperature. She was started on rocephin and vancomycin. Abnormal labs included hgb 7.8, platelet 118; BUN/CR 46/2.7 (baseline creatinine 1.7), Aphos 244, TSH 17, alessandro 554, iron low, TIBC low she was started on IV synthroid and solucortef.  Imaging at OSH included CT head: no acute intracranial abnormality, mild cerebral atrophy. CT T/A/P which demonstrated new lower lung nodules, possible metastatic disease or infection such as septic emboli, persistent lower mediastinal, retrocrural, periaortic, bilateral iliac, and bilateral inguinal lymphadenopathy. Per discussion with ICU RN, patient had been referred to  later this month for biopsy.    Consultants: Dr. Leone/Dr. Leong (nephrology)   Procedures: none    Antibiotics:   Vancomycin d3  Zosyn d3    Micro:    BCx x 2: ngtd  1/10 right foot culture: 3+ GPC in pairs, 3+ GNR  1/10 MRSA screen: in process  1/10 VRE culture: in process  1/10 Acinetobacter: in process  1/10 UCx: in process    ----------------------------------------------------------------------------------------------------------------------------------------------------------------  Subjective   Chief Complaint: f/u altered mental status, UTI     Subjective:   Patient seen and  examined this morning.  Tele reviewed. Events from last night noted. Discussed with JANAE Nam. Patient not communicative for me.    Review of Systems:   UTO as AMS    Objective   Objective:     Intake/Output Summary (Last 24 hours) at 1/12/2019 1223  Last data filed at 1/12/2019 0500  Gross per 24 hour   Intake 3650 ml   Output 1250 ml   Net 2400 ml      SpO2: 97 %     Physical Examination:   Vitals:    01/12/19 0800 01/12/19 0802 01/12/19 0902 01/12/19 1001   BP:  153/75 149/77 139/75   BP Location:       Patient Position:       Pulse:  91 89 87   Resp: 17      Temp:       TempSrc: Rectal      SpO2:  96% 96% 97%   Weight:       Height:          General:  Morbidly obese female; lying in bed; no acute distress  Heart:   RRR, S1 S2 normal, no m/r/g  Lungs:   CTA kenny/laterally, no wheezes  Abdomen:  Morbidly obese abdomen with scattered dime-sized skin tears; soft, NT, ND, +BS  Extremities: B/l lower extremity pachydermic thickening below knees; right heel ulcer very malodorous wet/dry gangrene  Neuro:  Does not respond to verbal stimuli; some mumbling with painful stimuli; no facial droop  Psych:  appropriate mood  Skin:  No bleeding, bruising    Pertinent laboratory and radiology data were reviewed:  Microbiology Results (last 10 days)     Procedure Component Value - Date/Time    Blood Culture With LYDIA - Blood, Arm, Left [[id number]] Collected:  01/11/19 0744    Lab Status:  Preliminary result Specimen:  Blood from Arm, Left Updated:  01/12/19 0800     Blood Culture No growth at 24 hours    Blood Culture With LYDIA - Blood, Arm, Right [[id number]] Collected:  01/11/19 0735    Lab Status:  Preliminary result Specimen:  Blood from Arm, Right Updated:  01/12/19 0800     Blood Culture No growth at 24 hours    Hardware / Foreign Body Culture - Hardware / Foreign Body, Foot, Right [[id number]] Collected:  01/10/19 0147    Lab Status:  Preliminary result Specimen:  Hardware / Foreign Body from Foot, Right Updated:  01/11/19  0114     Gram Stain Moderate (3+) Gram positive cocci in pairs      Moderate (3+) Gram negative bacilli      Rare (1+) WBCs seen          Medications Reviewed:     famotidine 20 mg Intravenous Daily   heparin (porcine) 5,000 Units Subcutaneous Q8H   hydrocortisone sodium succinate 50 mg Intravenous Q6H   insulin aspart 0-7 Units Subcutaneous 4x Daily AC & at Bedtime   levothyroxine 50 mcg Oral Q AM   pantoprazole 40 mg Intravenous Q AM   piperacillin-tazobactam 4.5 g Intravenous Q8H   sodium chloride 3 mL Intravenous Q12H   vancomycin 2,000 mg Intravenous Q48H        Assessment /Plan   Assessment/Plan:   1. Sepsis (leukocytosis + hypothermia + leg cellulitis/UTI/right heel necrotic ulcer)   · Monitor on telemetry in ICU; f/u UCx  · D/c vancomycin; start daptomycin to avoid nephrotoxic medications  · CK wnl; continue zosyn  · F/u MRSA screen, BCx  · Not able to tolerate CT head  · Temperature normalized with re-warming, continue rectal probe    2. Acute on chronic renal failure with hyperkalemia.   · Nephrology consulted, appreciate assistance, noted plans for bumex drip  · Uric acid and LDH wnl, less likely TLS    3. Antibiotics as aboveHypothyroidism s/p IV synthroid at OSH, continue on PO synthroid.   · TSH improved to 12. AM TSH.   · Will place NGT, check AXR and start PO synthroid as not alert enough to take PO medications  · Wean solucortef    4. Diffuse lymphadenopathy, suspect lymphoproliferative disorder. Will consult heme/onc, spoke with Dr. Theodore    5. Acute metabolic encephalopathy, multifactorial: sepsis + hypothyroidism. F/u 25-hydroxy vitamin D     6. Right heel ulcer  · venous and arterial dopplers wnl, however due to body habitus suspect this may be skewed  · F/u wound cultures  · Dr. Rueda following, appreciate input    7. Microcytic anemia of chronic disease. S/p 1 unit PRBC transfusion with appropriate response    8. Bilateral lower extremity cellulitis    9. Acute UTI, had clay catheter placed at  OSH. F/u UCx. abx as above.    10. Morbid obesity. Body mass index is 62.29 kg/m². complicates all aspects of care    FEN: NPO until more alert and following commands; IV fluids  DVT Prophylaxis: change lovenox to heparin due to renal issues  PUD Prophylaxis: start pepcid as on solucortef    Reason for continued hospitalization: above  Disposition: ICU  Discussed with: JANAE Nam and sister Virginia on phone    Liz Gregg MD   12:23 PM on 1/12/2019

## 2019-01-13 ENCOUNTER — APPOINTMENT (OUTPATIENT)
Dept: CT IMAGING | Facility: HOSPITAL | Age: 59
End: 2019-01-13

## 2019-01-13 LAB
A-A DO2: ABNORMAL MMHG
ACINETOBACTER SCREEN CX: NORMAL
ALBUMIN SERPL-MCNC: 3.3 G/DL (ref 3.5–5)
ALP SERPL-CCNC: 323 U/L (ref 38–126)
ALT SERPL W P-5'-P-CCNC: 34 U/L (ref 13–69)
ANION GAP SERPL CALCULATED.3IONS-SCNC: 16.8 MMOL/L (ref 10–20)
ANISOCYTOSIS BLD QL: NORMAL
APTT PPP: 29 SECONDS (ref 25–36)
ARTERIAL PATENCY WRIST A: POSITIVE
AST SERPL-CCNC: 21 U/L (ref 15–46)
ATMOSPHERIC PRESS: 735 MMHG
BACTERIA SPEC AEROBE CULT: NORMAL
BASE EXCESS BLDA CALC-SCNC: -4.1 MMOL/L (ref 0–2)
BASOPHILS # BLD AUTO: 0.16 10*3/MM3 (ref 0–0.2)
BASOPHILS NFR BLD AUTO: 0.7 % (ref 0–2.5)
BDY SITE: ABNORMAL
BILIRUB CONJ SERPL-MCNC: 0.4 MG/DL (ref 0–0.4)
BILIRUB INDIRECT SERPL-MCNC: 0 MG/DL
BILIRUB SERPL-MCNC: 0.4 MG/DL (ref 0.2–1.3)
BUN BLD-MCNC: 73 MG/DL (ref 7–20)
BUN/CREAT SERPL: 19.2 (ref 7.1–23.5)
CALCIUM SPEC-SCNC: 8.1 MG/DL (ref 8.4–10.2)
CHLORIDE SERPL-SCNC: 108 MMOL/L (ref 98–107)
CO2 SERPL-SCNC: 18 MMOL/L (ref 26–30)
COHGB MFR BLD: 1 % (ref 0–2)
CREAT BLD-MCNC: 3.8 MG/DL (ref 0.6–1.3)
DEPRECATED RDW RBC AUTO: 59.5 FL (ref 37–54)
EOSINOPHIL # BLD AUTO: 0.03 10*3/MM3 (ref 0–0.7)
EOSINOPHIL NFR BLD AUTO: 0.1 % (ref 0–7)
ERYTHROCYTE [DISTWIDTH] IN BLOOD BY AUTOMATED COUNT: 20.5 % (ref 11.5–14.5)
GAS FLOW AIRWAY: 2 LPM
GFR SERPL CREATININE-BSD FRML MDRD: 12 ML/MIN/1.73
GFR SERPL CREATININE-BSD FRML MDRD: 15 ML/MIN/1.73
GLUCOSE BLD-MCNC: 202 MG/DL (ref 74–98)
GLUCOSE BLDC GLUCOMTR-MCNC: 190 MG/DL (ref 70–130)
GLUCOSE BLDC GLUCOMTR-MCNC: 200 MG/DL (ref 70–130)
GLUCOSE BLDC GLUCOMTR-MCNC: 200 MG/DL (ref 70–130)
GLUCOSE BLDC GLUCOMTR-MCNC: 201 MG/DL (ref 70–130)
HCO3 BLDA-SCNC: 20.2 MMOL/L (ref 22–28)
HCT VFR BLD AUTO: 27.1 % (ref 37–47)
HCT VFR BLD CALC: 26.5 %
HGB BLD-MCNC: 8.6 G/DL (ref 12–16)
HGB BLDA-MCNC: 8.7 G/DL (ref 12–18)
HOROWITZ INDEX BLD+IHG-RTO: 28 %
HYPOCHROMIA BLD QL: NORMAL
IMM GRANULOCYTES # BLD AUTO: 2.96 10*3/MM3 (ref 0–0.06)
IMM GRANULOCYTES NFR BLD AUTO: 13.1 % (ref 0–0.6)
INR PPP: 1.41 (ref 0.9–1.1)
LYMPHOCYTES # BLD AUTO: 1.73 10*3/MM3 (ref 0.6–3.4)
LYMPHOCYTES NFR BLD AUTO: 7.7 % (ref 10–50)
MCH RBC QN AUTO: 25.6 PG (ref 27–31)
MCHC RBC AUTO-ENTMCNC: 31.7 G/DL (ref 30–37)
MCV RBC AUTO: 80.7 FL (ref 81–99)
METHGB BLD QL: 1.1 % (ref 0–1.5)
MICROCYTES BLD QL: NORMAL
MODALITY: ABNORMAL
MONOCYTES # BLD AUTO: 0.84 10*3/MM3 (ref 0–0.9)
MONOCYTES NFR BLD AUTO: 3.7 % (ref 0–12)
MRSA SPEC QL CULT: NORMAL
NEUTROPHILS # BLD AUTO: 16.89 10*3/MM3 (ref 2–6.9)
NEUTROPHILS NFR BLD AUTO: 74.7 % (ref 37–80)
NOTE: ABNORMAL
NRBC BLD AUTO-RTO: 1.6 /100 WBC (ref 0–0)
OXYHGB MFR BLDV: 95.5 % (ref 94–99)
PCO2 BLDA: 33.3 MM HG (ref 35–45)
PCO2 TEMP ADJ BLD: ABNORMAL MM HG (ref 35–45)
PH BLDA: 7.39 PH UNITS (ref 7.3–7.5)
PH, TEMP CORRECTED: ABNORMAL PH UNITS
PLATELET # BLD AUTO: 131 10*3/MM3 (ref 130–400)
PMV BLD AUTO: ABNORMAL FL (ref 6–12)
PO2 BLDA: 89.6 MM HG (ref 75–100)
PO2 TEMP ADJ BLD: ABNORMAL MM HG (ref 83–108)
POTASSIUM BLD-SCNC: 4.8 MMOL/L (ref 3.5–5.1)
PROT SERPL-MCNC: 6.8 G/DL (ref 6.3–8.2)
PROTHROMBIN TIME: 15.6 SECONDS (ref 9.3–12.1)
RBC # BLD AUTO: 3.36 10*6/MM3 (ref 4.2–5.4)
SAO2 % BLDCOA: 97.5 % (ref 94–100)
SMALL PLATELETS BLD QL SMEAR: ADEQUATE
SODIUM BLD-SCNC: 138 MMOL/L (ref 137–145)
VENTILATOR MODE: ABNORMAL
VRE SPEC QL CULT: NORMAL
WBC MORPH BLD: NORMAL
WBC NRBC COR # BLD: 22.61 10*3/MM3 (ref 4.8–10.8)

## 2019-01-13 PROCEDURE — 36415 COLL VENOUS BLD VENIPUNCTURE: CPT | Performed by: HOSPITALIST

## 2019-01-13 PROCEDURE — 74176 CT ABD & PELVIS W/O CONTRAST: CPT

## 2019-01-13 PROCEDURE — 85007 BL SMEAR W/DIFF WBC COUNT: CPT | Performed by: HOSPITALIST

## 2019-01-13 PROCEDURE — 80048 BASIC METABOLIC PNL TOTAL CA: CPT | Performed by: HOSPITALIST

## 2019-01-13 PROCEDURE — 63710000001 INSULIN REGULAR HUMAN PER 5 UNITS: Performed by: HOSPITALIST

## 2019-01-13 PROCEDURE — 70450 CT HEAD/BRAIN W/O DYE: CPT

## 2019-01-13 PROCEDURE — 25010000002 HYDROCORTISONE SODIUM SUCCINATE 100 MG RECONSTITUTED SOLUTION: Performed by: HOSPITALIST

## 2019-01-13 PROCEDURE — 85730 THROMBOPLASTIN TIME PARTIAL: CPT | Performed by: HOSPITALIST

## 2019-01-13 PROCEDURE — 82962 GLUCOSE BLOOD TEST: CPT

## 2019-01-13 PROCEDURE — 99233 SBSQ HOSP IP/OBS HIGH 50: CPT | Performed by: HOSPITALIST

## 2019-01-13 PROCEDURE — 71250 CT THORAX DX C-: CPT

## 2019-01-13 PROCEDURE — 36600 WITHDRAWAL OF ARTERIAL BLOOD: CPT

## 2019-01-13 PROCEDURE — 25010000002 MEROPENEM IN SODIUM CHLORIDE 0.9% 50 ML 1 GM/50ML RECONSTITUTED SOLUTION: Performed by: HOSPITALIST

## 2019-01-13 PROCEDURE — 85025 COMPLETE CBC W/AUTO DIFF WBC: CPT | Performed by: HOSPITALIST

## 2019-01-13 PROCEDURE — 99233 SBSQ HOSP IP/OBS HIGH 50: CPT | Performed by: PODIATRIST

## 2019-01-13 PROCEDURE — 83050 HGB METHEMOGLOBIN QUAN: CPT

## 2019-01-13 PROCEDURE — 80076 HEPATIC FUNCTION PANEL: CPT | Performed by: HOSPITALIST

## 2019-01-13 PROCEDURE — 82805 BLOOD GASES W/O2 SATURATION: CPT

## 2019-01-13 PROCEDURE — 25010000002 PIPERACILLIN SOD-TAZOBACTAM PER 1 G: Performed by: INTERNAL MEDICINE

## 2019-01-13 PROCEDURE — 25010000002 HYDRALAZINE PER 20 MG: Performed by: HOSPITALIST

## 2019-01-13 PROCEDURE — 85610 PROTHROMBIN TIME: CPT | Performed by: HOSPITALIST

## 2019-01-13 PROCEDURE — 82375 ASSAY CARBOXYHB QUANT: CPT

## 2019-01-13 PROCEDURE — 25010000002 HEPARIN (PORCINE) PER 1000 UNITS: Performed by: HOSPITALIST

## 2019-01-13 RX ORDER — MEROPENEM AND SODIUM CHLORIDE 1 G/50ML
1 INJECTION, SOLUTION INTRAVENOUS ONCE
Status: COMPLETED | OUTPATIENT
Start: 2019-01-13 | End: 2019-01-13

## 2019-01-13 RX ORDER — MEROPENEM AND SODIUM CHLORIDE 500 MG/50ML
500 INJECTION, SOLUTION INTRAVENOUS EVERY 12 HOURS
Status: DISCONTINUED | OUTPATIENT
Start: 2019-01-14 | End: 2019-01-14

## 2019-01-13 RX ADMIN — MEROPENEM AND SODIUM CHLORIDE 1 G: 1 INJECTION, SOLUTION INTRAVENOUS at 13:56

## 2019-01-13 RX ADMIN — HUMAN INSULIN 2 UNITS: 100 INJECTION, SOLUTION SUBCUTANEOUS at 00:54

## 2019-01-13 RX ADMIN — PANTOPRAZOLE SODIUM 40 MG: 40 INJECTION, POWDER, FOR SOLUTION INTRAVENOUS at 06:17

## 2019-01-13 RX ADMIN — TAZOBACTAM SODIUM AND PIPERACILLIN SODIUM 4.5 G: 500; 4 INJECTION, SOLUTION INTRAVENOUS at 06:17

## 2019-01-13 RX ADMIN — BUMETANIDE 1 MG/HR: 0.25 INJECTION INTRAMUSCULAR; INTRAVENOUS at 21:22

## 2019-01-13 RX ADMIN — HUMAN INSULIN 3 UNITS: 100 INJECTION, SOLUTION SUBCUTANEOUS at 06:17

## 2019-01-13 RX ADMIN — HYDROCORTISONE SODIUM SUCCINATE 50 MG: 100 INJECTION, POWDER, FOR SOLUTION INTRAMUSCULAR; INTRAVENOUS at 00:54

## 2019-01-13 RX ADMIN — HEPARIN SODIUM 5000 UNITS: 5000 INJECTION, SOLUTION INTRAVENOUS; SUBCUTANEOUS at 21:22

## 2019-01-13 RX ADMIN — HUMAN INSULIN 3 UNITS: 100 INJECTION, SOLUTION SUBCUTANEOUS at 12:22

## 2019-01-13 RX ADMIN — HYDRALAZINE HYDROCHLORIDE 10 MG: 20 INJECTION INTRAMUSCULAR; INTRAVENOUS at 09:20

## 2019-01-13 RX ADMIN — HYDROCORTISONE SODIUM SUCCINATE 50 MG: 100 INJECTION, POWDER, FOR SOLUTION INTRAMUSCULAR; INTRAVENOUS at 13:55

## 2019-01-13 RX ADMIN — HUMAN INSULIN 3 UNITS: 100 INJECTION, SOLUTION SUBCUTANEOUS at 18:32

## 2019-01-13 RX ADMIN — LEVOTHYROXINE SODIUM 50 MCG: 50 TABLET ORAL at 06:17

## 2019-01-13 RX ADMIN — BUMETANIDE 1 MG/HR: 0.25 INJECTION INTRAMUSCULAR; INTRAVENOUS at 12:23

## 2019-01-13 RX ADMIN — HEPARIN SODIUM 5000 UNITS: 5000 INJECTION, SOLUTION INTRAVENOUS; SUBCUTANEOUS at 13:55

## 2019-01-13 RX ADMIN — FAMOTIDINE 20 MG: 10 INJECTION, SOLUTION INTRAVENOUS at 09:12

## 2019-01-13 RX ADMIN — HEPARIN SODIUM 5000 UNITS: 5000 INJECTION, SOLUTION INTRAVENOUS; SUBCUTANEOUS at 06:17

## 2019-01-13 NOTE — NURSING NOTE
1400 Dr Mistry aware that Nutrition doesn't come to see pt on Sunday unless it is TPN order and does not want to start tube feeding until tomorrow because pt will be npo tonight for procedure in am.  Pt still lethargic and will only moan to stimuli or pain.  Dr Mistry aware of CT results and has discussed plan with Dr Rueda.

## 2019-01-13 NOTE — PROGRESS NOTES
[facility] [place] [facility]    Name: Millicent Mckeon, 58 y.o. female  MRN: [id number], : 1960   Date of Admission: 1/10/2019   Date of Service: 19   PCP: Rosa Zamora MD     Hospital Course:   Ms. Mckeon is a 57 yo F with h/o DM2, hypothyroidism, GERD, HTN, morbid obesity (BMI 61), who was admitted on 1/10/19 from & for altered mental status. Per my discussion with the provider there patient was found on the floor at home covered in stool. She was admitted for nausea, extremity weakness and fall on the floor. Per discussion with ICU RN, she lives with her sister who is blind.      At OSH, she was hypothermic and required Sandra hugger and warm IV fluids for normalization of temperature. She was started on rocephin and vancomycin. Abnormal labs included hgb 7.8, platelet 118; BUN/CR 46/2.7 (baseline creatinine 1.7), Aphos 244, TSH 17, alessandro 554, iron low, TIBC low she was started on IV synthroid and solucortef.  Imaging at OSH included CT head: no acute intracranial abnormality, mild cerebral atrophy. CT T/A/P which demonstrated new lower lung nodules, possible metastatic disease or infection such as septic emboli, persistent lower mediastinal, retrocrural, periaortic, bilateral iliac, and bilateral inguinal lymphadenopathy. Per discussion with ICU RN, patient had been referred to  later this month for biopsy, however per discussion with family they say lymph node swelling is new.    She was started on vancomycin/zosyn initially however due to renal failure and progressive sepsis this was broadened to daptomycin and merrem. Venous and arterial dopplers of lower extremities were obtained. Dr. Rueda with podiatry was consulted and is planning I&D tomorrow for right heel ulcer. She has been primarily nonverbal throughout hospital stay. NGT was placed for medications. RD consulted for initiation of tube feeds. CT head/chest/abd/pelvis were obtained due to continued altered  mental status. CT head unremarkable for acute process. CT chest with vascular congestion. CT A/P with small amount of ascites, abdominal wall infiltration (edema vs infection), distended gallbladder without gallstones.     Consultants: Dr. Rueda (podiatry); Dr. Leone/Dr. Leong (nephrology); RD; PT/OT; SW/CM    Procedures:   1/11 #1 unit PRBC  1/12 NGT    Antibiotics:   daptomycin d2  merrem d1  S/p Vancomycin d3 Zosyn d3    Micro:   1/11 BCx x 2: ngtd  1/10 right foot culture: 3+ GPC in pairs, 3+ GNR  1/10 MRSA screen: in process  1/10 VRE culture: in process  1/10 Acinetobacter: in process  1/10 UCx: in process    ----------------------------------------------------------------------------------------------------------------------------------------------------------------  Subjective   Chief Complaint: f/u altered mental status, UTI     Subjective:   Patient seen and examined this morning.  Tele reviewed. Events from last night noted. Discussed with JANAE Nam, patient prefers to turn to right side and noted to move left extremities more than right. Patient not communicative for me.     Review of Systems:   UTO as AMS    Objective   Objective:     Intake/Output Summary (Last 24 hours) at 1/13/2019 1809  Last data filed at 1/13/2019 1630  Gross per 24 hour   Intake 834 ml   Output 4200 ml   Net -3366 ml      SpO2: 100 %     Physical Examination:   Vitals:    01/13/19 1302 01/13/19 1402 01/13/19 1502 01/13/19 1602   BP: 156/79 158/77 152/72 156/77   BP Location:       Patient Position:       Pulse: 88 86 82 84   Resp:       Temp:       TempSrc:       SpO2: 99% 98% 99% 100%   Weight:       Height:          General:  Morbidly obese female eyes closed; lying in bed; no acute distress; spontaneous eye movements without moving head; PERRL  Heart:   RRR, S1 S2 normal, no m/r/g  Lungs:   CTA kenny/laterally, no wheezes  Abdomen:  Morbidly obese abdomen with scattered dime-sized skin tears; soft, NT, ND,  +BS  Extremities: B/l lower extremity pachydermal thickening below knees; right heel ulcer very malodorous wet/dry gangrene  Neuro:  Does not respond to verbal stimuli; some mumbling with painful stimuli; no facial droop  Psych:  appropriate mood  Skin:  No bleeding, bruising    Pertinent laboratory and radiology data were reviewed:  Microbiology Results (last 10 days)     Procedure Component Value - Date/Time    Blood Culture With LYDIA - Blood, Arm, Left [[id number]] Collected:  01/11/19 0744    Lab Status:  Preliminary result Specimen:  Blood from Arm, Left Updated:  01/13/19 0800     Blood Culture No growth at 2 days    Blood Culture With LYDIA - Blood, Arm, Right [[id number]] Collected:  01/11/19 0784    Lab Status:  Preliminary result Specimen:  Blood from Arm, Right Updated:  01/13/19 0800     Blood Culture No growth at 2 days    Hardware / Foreign Body Culture - Hardware / Foreign Body, Foot, Right [[id number]]  (Abnormal) Collected:  01/10/19 8836    Lab Status:  Preliminary result Specimen:  Hardware / Foreign Body from Foot, Right Updated:  01/13/19 0713     Hardware / Foreign Body Culture Heavy growth (4+) Gram Negative Bacilli     Comment: Identification and ABHILASH to follow.          Gram Stain Moderate (3+) Gram positive cocci in pairs      Moderate (3+) Gram negative bacilli      Rare (1+) WBCs seen    MRSA Screen Culture - Swab, Nares [[id number]]  (Normal) Collected:  01/10/19 2318    Lab Status:  Final result Specimen:  Swab from Nares Updated:  01/13/19 0606     MRSA SCREEN CX No Methicillin Resistant Staphylococcus aureus isolated    VRE Culture - Swab, Per Rectum [[id number]]  (Normal) Collected:  01/10/19 2318    Lab Status:  Final result Specimen:  Swab from Per Rectum Updated:  01/13/19 0801     VRE SCREEN CX No Vancomycin Resistant Enterococcus Isolated    Acinetobacter Screen - Swab, Nares [[id number]]  (Normal) Collected:  01/10/19 2318    Lab Status:  Final result Specimen:  Swab from Nares Updated:   01/13/19 0606     ACINETOBACTER SCREEN CX No Acinetobacter isolated    Urine Culture - Urine, Urine, Clean Catch [[id number]] Collected:  01/10/19 2313    Lab Status:  Final result Specimen:  Urine, Clean Catch Updated:  01/13/19 0553     Urine Culture Mixed Kati Isolated    Narrative:       Specimen contains mixed organisms of questionable pathogenicity which indicates contamination with commensal kati.  Further identification is unlikely to provide clinically useful information.  Suggest recollection.          Medications Reviewed:     DAPTOmycin 6 mg/kg (Adjusted) Intravenous Q48H   famotidine 20 mg Intravenous Daily   heparin (porcine) 5,000 Units Subcutaneous Q8H   hydrocortisone sodium succinate 50 mg Intravenous Q12H   insulin regular 0-7 Units Subcutaneous Q6H   levothyroxine 50 mcg Oral Q AM   [START ON 1/14/2019] meropenem 500 mg Intravenous Q12H   pantoprazole 40 mg Intravenous Q AM   sodium chloride 3 mL Intravenous Q12H        Assessment /Plan   Assessment/Plan:   1. Sepsis (leukocytosis + hypothermia + leg cellulitis/UTI/right heel necrotic ulcer)   · Monitor on telemetry in ICU; f/u UCx  · Daptomycin; discontinue zosyn, start merrem  · F/u MRSA screen, BCx  · continue rectal probe  · May benefit from palliative care consult    2. Acute on chronic renal failure with hyperkalemia  · Nephrology consulted, appreciate assistance, on bumex drip, maintain clay catheter for accurate I/Os: -3L    3. Hypothyroidism s/p IV synthroid at OSH, continue on PO synthroid.   · Wean solucortef to q12h as becoming hypertensive    4. Diffuse lymphadenopathy, suspect lymphoproliferative disorder. Will consult heme/onc in AM, not able to reach over weekend    5. Acute metabolic encephalopathy, multifactorial: sepsis + hypothyroidism. F/u 25-hydroxy vitamin D.  · Check CT head, chest, A/P    6. Right heel ulcer   · venous and arterial dopplers wnl, however due to body habitus suspect this may be skewed  · Wound cx with 4+  GNR  · Dr. Rueda following, plans for I&D tomorrow    7. Microcytic anemia of chronic disease. S/p 1 unit PRBC transfusion with appropriate response    8. Bilateral lower extremity cellulitis. Mild improvement. abx as above    9. Acute UTI, had clay catheter placed at [facility]. F/u UCx. abx as above.    10. Morbid obesity. Body mass index is 62.55 kg/m². complicates all aspects of care    FEN: NPO until more alert and following commands; IV fluids; RD consulted for TFs  DVT Prophylaxis: change lovenox to heparin due to renal issues  PUD Prophylaxis: start pepcid as on solucortef    Reason for continued hospitalization: above  Disposition: ICU  Discussed with: JANAE Nam, Dr. Mohit Gregg MD   6:09 PM on 1/13/2019

## 2019-01-13 NOTE — PLAN OF CARE
Problem: Patient Care Overview  Goal: Discharge Needs Assessment   01/13/19 0214   Discharge Needs Assessment   Discharge Coordination/Progress  to consult in regards to pts ability to care for self at home       Problem: Fall Risk (Adult)  Intervention: Monitor/Assist with Self Care   01/13/19 0214   Activity   Activity Assistance Provided assistance, 2 people     Intervention: Reduce Risk/Promote Restraint Free Environment   01/13/19 0200   Safety Management   Environmental Safety Modification assistive device/personal items within reach;clutter free environment maintained;room near unit station   Safety Promotion/Fall Prevention activity supervised;fall prevention program maintained;safety round/check completed     Intervention: Review Medications/Identify Contributors to Fall Risk   01/13/19 0200   Safety Management   Medication Review/Management medications reviewed       Goal: Identify Related Risk Factors and Signs and Symptoms  Outcome: Ongoing (interventions implemented as appropriate)   01/13/19 0214   Fall Risk (Adult)   Related Risk Factors (Fall Risk) confusion/agitation;bladder function altered;gait/mobility problems;history of falls;polypharmacy;sensory deficits;environment unfamiliar   Signs and Symptoms (Fall Risk) presence of risk factors     Goal: Absence of Fall  Outcome: Ongoing (interventions implemented as appropriate)   01/13/19 0214   Fall Risk (Adult)   Absence of Fall making progress toward outcome       Problem: Skin Injury Risk (Adult)  Intervention: Prevent/Manage Excess Moisture   01/13/19 0200   Skin Interventions   Skin Protection adhesive use limited;incontinence pads utilized;transparent dressing maintained;tubing/devices free from skin contact     Intervention: Maintain Head of Bed Elevation Less Than 30 Degrees as Tolerated   01/13/19 0200   Positioning   Head of Bed (HOB) HOB elevated     Intervention: Prevent/Minimize Shear/Friction Injuries   01/13/19 0200    Skin Interventions   Pressure Reduction Devices heel offloading device utilized;positioning supports utilized;pressure-redistributing mattress utilized   Positioning   Positioning/Transfer Devices pillows;in use     Intervention: Prevent or Minimize Pressure   01/13/19 0200   Skin Interventions   Pressure Reduction Techniques heels elevated off bed;weight shift assistance provided   Positioning   Body Position weight shift assistance provided;other (see comments)  (bed in rotation mode)       Goal: Identify Related Risk Factors and Signs and Symptoms  Outcome: Ongoing (interventions implemented as appropriate)   01/13/19 0214   Skin Injury Risk (Adult)   Related Risk Factors (Skin Injury Risk) critical care admission;edema;hypothermia/hyperthermia;infection;mobility impaired;medication;moisture;tissue perfusion altered     Goal: Skin Health and Integrity  Outcome: Ongoing (interventions implemented as appropriate)   01/13/19 0214   Skin Injury Risk (Adult)   Skin Health and Integrity making progress toward outcome

## 2019-01-13 NOTE — PLAN OF CARE
Problem: Patient Care Overview  Goal: Discharge Needs Assessment  Outcome: Ongoing (interventions implemented as appropriate)      Problem: Fall Risk (Adult)  Goal: Identify Related Risk Factors and Signs and Symptoms  Outcome: Ongoing (interventions implemented as appropriate)   01/13/19 1502   Fall Risk (Adult)   Related Risk Factors (Fall Risk) confusion/agitation     Goal: Absence of Fall  Outcome: Ongoing (interventions implemented as appropriate)   01/13/19 1502   Fall Risk (Adult)   Absence of Fall (no falls this shift)       Problem: Skin Injury Risk (Adult)  Goal: Identify Related Risk Factors and Signs and Symptoms  Outcome: Ongoing (interventions implemented as appropriate)   01/13/19 1502   Skin Injury Risk (Adult)   Related Risk Factors (Skin Injury Risk) cognitive impairment;tissue perfusion altered      01/13/19 1502   Skin Injury Risk (Adult)   Related Risk Factors (Skin Injury Risk) cognitive impairment;tissue perfusion altered     Goal: Skin Health and Integrity  Outcome: Ongoing (interventions implemented as appropriate)   01/13/19 1502   Skin Injury Risk (Adult)   Skin Health and Integrity (sceduled for i/d in am )

## 2019-01-13 NOTE — PROGRESS NOTES
"Nephrology Progress Note.    LOS: 3 days    Patient Care Team:  Rosa Zamora MD as PCP - General (Internal Medicine)  Geremias Shepherd MD as Consulting Physician (General Surgery)    Chief Complaint:  No chief complaint on file.      Subjective     Follow up for FREDDY and related issues.    Interval History:     Patient Complaints: none    Patient seen and examined this morning.  Events from last night noted.  She still minimally responsive.  There is significant edema.   She still has no meaningful interaction.      Review of Systems:    Patient is unresponsive and sedated no meaningful review of system is possible.      Objective     Vital Signs  /90   Pulse 91   Temp 95 °F (35 °C) (Oral)   Resp 18   Ht 165.1 cm (65\")   Wt (!) 171 kg (375 lb 14.4 oz)   SpO2 100%   BMI 62.55 kg/m²       No intake/output data recorded.    Intake/Output Summary (Last 24 hours) at 1/13/2019 0947  Last data filed at 1/13/2019 0600  Gross per 24 hour   Intake 1292 ml   Output 3024 ml   Net -1732 ml       Physical Exam:    General Appearance: no acute distress,   HEENT: Oral mucosa dry, extra occular movements intact. Sclera clear.  Skin: Warm and dry  Neck: supple, no JVD, trachea midline  Lungs:Chest shape is normal. Breath sounds heard bilaterally equally.  No wheezing   Heart: regular rate and rhythm. normal S1 and S2, no S3, no rub, peripheral pulses weak but palpable.  Abdomen: Obese, soft, non-tender,  present bowel sounds to auscultation  : no palpable bladder.  Extremities: 2 to 3 + edema, no cyanosis or clubbing.   Neuro: No meaningful interaction   Results Review:    Results from last 7 days   Lab Units  01/13/19   0423  01/12/19   1558  01/12/19   0431   01/11/19   0419  01/10/19   2110   SODIUM mmol/L  138  137  138   < >  135*  134*   POTASSIUM mmol/L  4.8  5.3*  5.5*   < >  5.7*  5.5*   CHLORIDE mmol/L  108*  108*  108*   < >  106  106   CO2 mmol/L  18.0*  19.0*  17.0*   < >  19.0*  20.0*   BUN mg/dL  73*  " 64*  60*   < >  50*  48*   CREATININE mg/dL  3.80*  3.80*  3.70*   < >  3.50*  3.40*   CALCIUM mg/dL  8.1*  8.0*  7.9*   < >  8.0*  8.0*   BILIRUBIN mg/dL   --    --    --    --   0.4  0.4   ALK PHOS U/L   --    --    --    --   305*  276*   ALT (SGPT) U/L   --    --    --    --   37  38   AST (SGOT) U/L   --    --    --    --   28  31   GLUCOSE mg/dL  202*  216*  190*   < >  139*  79    < > = values in this interval not displayed.       Estimated Creatinine Clearance: 25.6 mL/min (A) (by C-G formula based on SCr of 3.8 mg/dL (H)).    Results from last 7 days   Lab Units  01/12/19   0431  01/11/19   0419  01/10/19   2110   MAGNESIUM mg/dL   --   2.6*  2.5*   PHOSPHORUS mg/dL  7.9*  7.4*  6.8*       Results from last 7 days   Lab Units  01/12/19   0431   URIC ACID mg/dL  4.4       Results from last 7 days   Lab Units  01/13/19   0423  01/12/19   0431  01/11/19   0419  01/10/19   2112   WBC 10*3/mm3  22.61*  20.07*  18.35*  16.35*   HEMOGLOBIN g/dL  8.6*  8.2*  7.6*  7.4*   PLATELETS 10*3/mm3  131  133  142  140       Results from last 7 days   Lab Units  01/10/19   2112   INR   1.57*         Imaging Results (last 24 hours)     Procedure Component Value Units Date/Time    XR Abdomen KUB [[id number]] Collected:  01/12/19 1856     Updated:  01/12/19 1857    Narrative:       FINAL REPORT    CLINICAL HISTORY:  replacement of ng tube    FINDINGS:  The NG tube terminates in the gastric body.  Impression: NG tube  in stomach      Impression:       Authenticated by John Mckeon MD on 01/12/2019 06:56:34 PM    XR Abdomen KUB [[id number]] Collected:  01/12/19 1716     Updated:  01/12/19 1717    Narrative:       FINAL REPORT    CLINICAL HISTORY:  repeat of kub to check ng tube placement    FINDINGS:  The NG tube is looped in the gastric fundus with the tip  terminating in the midesophagus.  Recommend repositioning prior  to use.  Impression: Malpositioned NG tube      Impression:       Authenticated by John Mckeon MD on  01/12/2019 05:16:32 PM    XR Abdomen KUB [[id number]] Collected:  01/12/19 1547     Updated:  01/12/19 1559    Narrative:       PROCEDURE: XR ABDOMEN KUB-     INDICATION:  placement of ng tube     COMPARISON:  None.     FINDINGS:  A supine view of the abdomen was obtained.  The bowel gas  pattern of the visualized left and central portion of the abdomen is  normal.  There are no pathologic calcifications.  No acute osseous  abnormality is identified. NG tube is present. The tip loops back on  itself and is located close in the mid thoracic esophagus, recommend  repositioning.       Impression:       NG tube extends past the GE junction but loops back and  distal tip is in the region of the mid thoracic esophagus, recommend  repositioning.        This report was finalized on 1/12/2019 3:57 PM by Cookie Abarca MD.          DAPTOmycin 6 mg/kg (Adjusted) Intravenous Q48H   famotidine 20 mg Intravenous Daily   heparin (porcine) 5,000 Units Subcutaneous Q8H   hydrocortisone sodium succinate 50 mg Intravenous Q12H   insulin regular 0-7 Units Subcutaneous Q6H   levothyroxine 50 mcg Oral Q AM   pantoprazole 40 mg Intravenous Q AM   piperacillin-tazobactam 4.5 g Intravenous Q8H   sodium chloride 3 mL Intravenous Q12H       bumetanide 1 mg/hr Last Rate: 1 mg/hr (01/12/19 2230)   Pharmacy to Dose Zosyn     Sodium chloride 75 mL/hr Last Rate: 75 mL/hr (01/12/19 1300)       Medication Review:   Current Facility-Administered Medications   Medication Dose Route Frequency Provider Last Rate Last Dose   • acetaminophen (TYLENOL) tablet 650 mg  650 mg Oral Q4H PRN Milad Leone MD, FASN       • bumetanide (BUMEX) 10 mg in Sodium chloride 0.9 % 100 mL (0.1 mg/mL) infusion  1 mg/hr Intravenous Continuous Benjamin Cruz MD 10 mL/hr at 01/12/19 2230 1 mg/hr at 01/12/19 2230   • DAPTOmycin (CUBICIN) 600 mg in Sodium chloride 0.9 % 50 mL IVPB  6 mg/kg (Adjusted) Intravenous Q48H Liz Gregg  mL/hr at 01/12/19 1400 600 mg  at 01/12/19 1400   • dextrose (D50W) 25 g/ 50mL Intravenous Solution 25 g  25 g Intravenous Q15 Min PRN Milad Leone MD, FASN       • dextrose (GLUTOSE) oral gel 1 tube  1 tube Oral Q15 Min PRN Milad Leone MD, FASN       • famotidine (PEPCID) injection 20 mg  20 mg Intravenous Daily Liz Gregg MD   20 mg at 01/13/19 0912   • glucagon (human recombinant) (GLUCAGEN DIAGNOSTIC) injection 1 mg  1 mg Subcutaneous PRN Milad Leone MD, FASN       • heparin (porcine) 5000 UNIT/ML injection 5,000 Units  5,000 Units Subcutaneous Q8H Liz Gregg MD   5,000 Units at 01/13/19 0617   • hydrALAZINE (APRESOLINE) injection 10 mg  10 mg Intravenous Q6H PRN Liz Gregg MD   10 mg at 01/13/19 0920   • hydrocortisone sodium succinate (Solu-CORTEF) injection 50 mg  50 mg Intravenous Q12H Liz Gregg MD       • insulin regular (humuLIN R,novoLIN R) injection 0-7 Units  0-7 Units Subcutaneous Q6H Liz Gregg MD   3 Units at 01/13/19 0617   • levothyroxine (SYNTHROID, LEVOTHROID) tablet 50 mcg  50 mcg Oral Q AM Milad Leone MD, FASN   50 mcg at 01/13/19 0617   • Morphine sulfate (PF) injection 2 mg  2 mg Intravenous Q4H PRN Milad Leone MD, FASN        And   • naloxone (NARCAN) injection 0.4 mg  0.4 mg Intravenous Q5 Min PRN Milad Leone MD, FASN       • ondansetron (ZOFRAN) tablet 4 mg  4 mg Oral Q6H PRN Milad Leone MD, FASN        Or   • ondansetron ODT (ZOFRAN-ODT) disintegrating tablet 4 mg  4 mg Oral Q6H PRN Milad Leone MD, FASN        Or   • ondansetron (ZOFRAN) injection 4 mg  4 mg Intravenous Q6H PRN Milad Leone MD, GILA       • pantoprazole (PROTONIX) injection 40 mg  40 mg Intravenous Q AM Milad Leone MD, FASN   40 mg at 01/13/19 0617   • Pharmacy to Dose Zosyn   Does not apply Continuous PRN Milad Leone MD, FASN       • piperacillin-tazobactam (ZOSYN) 4.5 g in iso-osmotic dextrose 100 mL IVPB (premix)  4.5 g Intravenous Q8H Milad Leone MD,  FASN   4.5 g at 01/13/19 0617   • sodium chloride 0.9 % flush 1-10 mL  1-10 mL Intravenous PRN Milad Leone MD, GILA       • sodium chloride 0.9 % flush 3 mL  3 mL Intravenous Q12H Milad Leone MD, GILA   3 mL at 01/12/19 0850   • Sodium chloride 0.9 % infusion  75 mL/hr Intravenous Continuous Liz Gregg MD 75 mL/hr at 01/12/19 1300 75 mL/hr at 01/12/19 1300   • zinc oxide 13 % cream   Topical BID PRN Milad Leone MD, GILA   1 application at 01/12/19 0414       Assessment/Plan     1.   Acute on chronic renal failure (CMS/HCC): It is likely secondary to hypotensive episodes as well as the use of Bactrim.  There is possibility that she may be taking nonsteroidals and other hemodynamic abnormality is causing the current problem.        2.   Cellulitis of both lower extremities: Continue with the IV antibiotics.    3.   Altered mental status:   4.   Anemia: Transfused as needed  5.   Bilateral edema of lower extremity:   6.   GERD (gastroesophageal reflux disease)  7.   Hyperkalemia:   8.   Hypertension  9.   Hypothermia  10.   Hypothyroidism  11.   Type 2 diabetes mellitus with complication (CMS/HCC)  12.   Vitamin B12 deficiency    Plan:  Continue  Bumex drip 1 mg per hour due to her volume status  Patient might end up on dialysis depending on her clinical course  Work-up for her encephalopathy with CT head, chest , Abdomen and Pelvis  Discussed with the nurse  > 35 min spent in direct care     Benjamin Cruz MD  01/13/19  9:47 AM    Dictated utilizing Dragon dictation.

## 2019-01-13 NOTE — SIGNIFICANT NOTE
01/13/19 1141   Rehab Time/Intention   Evaluation Not Performed patient unavailable for evaluation  (Patient is not alert and unable to follow commands.  She is unable to participate today and PT will attempt again tomorrow.)   Rehab Treatment   Discipline physical therapist

## 2019-01-13 NOTE — PROGRESS NOTES
Patient Care Team:  Rosa Zamora MD as PCP - General (Internal Medicine)  Geremias Shepherd MD as Consulting Physician (General Surgery)    Chief complaint right foot    Subjective .     58-year-old morbidly obese diabetic female with multiple medical comorbidities still remains in the ICU unresponsive with altered mental status.  Still undergoing workup to determine the source and reasoning for a nonresponsiveness.  Antibiotics are being changed and monitored per the hospitalist service.  She had multiple recent scans performed as well.    Review of Systems  All systems were reviewed and negative except for chief complaint.    History  Past Medical History:   Diagnosis Date   • Diabetes mellitus (CMS/HCC)    • Disease of thyroid gland    • GERD (gastroesophageal reflux disease)    • Hypertension    , Past Surgical History:   Procedure Laterality Date   • EYE SURGERY     • LEG SURGERY     , Family History   Problem Relation Age of Onset   • Asthma Mother    • Hypertension Father    • Stroke Father    , Social History     Tobacco Use   • Smoking status: Never Smoker   • Smokeless tobacco: Never Used   Substance Use Topics   • Alcohol use: No     Frequency: Never   • Drug use: No   , Medications Prior to Admission   Medication Sig Dispense Refill Last Dose   • ferrous sulfate 324 (65 Fe) MG tablet delayed-release EC tablet Take 324 mg by mouth 2 (Two) Times a Day.      • gabapentin (NEURONTIN) 600 MG tablet Take 600 mg by mouth 3 (Three) Times a Day.      • glipiZIDE (GLUCOTROL) 10 MG tablet Take 20 mg by mouth 2 (Two) Times a Day.      • Insulin Glargine (BASAGLAR KWIKPEN) 100 UNIT/ML injection pen Inject 30 Units under the skin into the appropriate area as directed Every Night.      • meclizine 25 MG chewable tablet chewable tablet Chew 25 mg 3 (Three) Times a Day As Needed (for dizziness or motion sickness).      • allopurinol (ZYLOPRIM) 300 MG tablet Take 300 mg by mouth Daily.      • amLODIPine (NORVASC) 5  "MG tablet Take 5 mg by mouth Daily.  0    • esomeprazole (nexIUM) 40 MG capsule Take 40 mg by mouth Every Morning Before Breakfast.  0    • furosemide (LASIX) 20 MG tablet Take 20 mg by mouth 2 (Two) Times a Day.      • RA ASPIRIN EC 81 MG EC tablet Take 81 mg by mouth Daily.  0    • RA VITAMIN C 500 MG tablet Take 500 mg by mouth Daily.  0    • RA VITAMIN D-3 2000 units capsule Take 2,000 Units by mouth Daily.  0    • simvastatin (ZOCOR) 20 MG tablet Take 20 mg by mouth Every Night.  0    • TRADJENTA 5 MG tablet tablet Take 5 mg by mouth Daily.      , Scheduled Meds:    DAPTOmycin 6 mg/kg (Adjusted) Intravenous Q48H   famotidine 20 mg Intravenous Daily   heparin (porcine) 5,000 Units Subcutaneous Q8H   hydrocortisone sodium succinate 50 mg Intravenous Q12H   insulin regular 0-7 Units Subcutaneous Q6H   levothyroxine 50 mcg Oral Q AM   meropenem 1 g Intravenous Once   [START ON 1/14/2019] meropenem 500 mg Intravenous Q12H   pantoprazole 40 mg Intravenous Q AM   sodium chloride 3 mL Intravenous Q12H   , Continuous Infusions:    bumetanide 1 mg/hr Last Rate: 1 mg/hr (01/13/19 1223)   Pharmacy to Dose meropenem (MERREM)     Sodium chloride 75 mL/hr Last Rate: 75 mL/hr (01/12/19 1300)   , PRN Meds:  •  acetaminophen  •  dextrose  •  dextrose  •  glucagon (human recombinant)  •  hydrALAZINE  •  Morphine **AND** naloxone  •  ondansetron **OR** ondansetron ODT **OR** ondansetron  •  Pharmacy to Dose meropenem (MERREM)  •  sodium chloride  •  zinc oxide and Allergies:  Metformin and related    Objective     Vital Signs   /75   Pulse 89   Temp 95 °F (35 °C) (Oral)   Resp 18   Ht 165.1 cm (65\")   Wt (!) 171 kg (375 lb 14.4 oz)   SpO2 99%   BMI 62.55 kg/m²     Physical Exam: Patient is still nonresponsive other than to pain.  Still has significant lymphedema changes to both lower extremities as well as a malodorous necrotic black and brown appearing right heel ulceration with serosanguineous drainage.  There is no " softness or fluctuance around the wound.  No surrounding erythema or edema.     Results Review: Wound swabs are positive for gram-negative bacilli.  White blood cell count increased to 22.6.  Glucose 202.  BUN 73.  Creatinine 3.8.  Hematocrit 8.6.  Hemoglobin 27.1.  Imaging Results: No imaging of the lower extremities is able to be obtained other than plain film x-rays due to the patient's body habitus    Assessment/Plan   58-year-old morbidly obese diabetic female with multiple medical comorbidities still remains in the ICU unresponsive with altered mental status.    Cellulitis of both lower extremities    Altered mental status    Anemia    Bilateral edema of lower extremity    Cellulitis of lower extremity    Acute on chronic renal failure (CMS/HCC)    GERD (gastroesophageal reflux disease)    Hyperkalemia    Hypertension    Hypothermia    Hypothyroidism    Type 2 diabetes mellitus with complication (CMS/[facility])    Vitamin B12 deficiency  I discussed with the hospitalist service and the talus point given that we were unable to identify any other potential source of infection we will plan to proceed with incision and drainage of the right heel.  I'll plan to do this at some point tomorrow.  Surgical orders are then placed and placed nothing by mouth after midnight.  At this point I have yet to see any type of a family or anyone else involved in the patient's care or any type of power of  so we'll most likely have to consider this an emergent procedure unless someone presents tomorrow.  Given the patient's deterioration and increased white blood cell count I have no problems with declaring this emergent if need be.  Continue with local wound care.  Plan for I&D with any indicated procedures with right heel tomorrow.  I'll supervisor has been notified to place this on the surgical schedule.        Ar Rueda DPM  01/13/19  12:53 PM

## 2019-01-14 ENCOUNTER — ANESTHESIA EVENT (OUTPATIENT)
Dept: PERIOP | Facility: HOSPITAL | Age: 59
End: 2019-01-14

## 2019-01-14 ENCOUNTER — APPOINTMENT (OUTPATIENT)
Dept: CARDIOLOGY | Facility: HOSPITAL | Age: 59
End: 2019-01-14
Attending: HOSPITALIST

## 2019-01-14 ENCOUNTER — APPOINTMENT (OUTPATIENT)
Dept: GENERAL RADIOLOGY | Facility: HOSPITAL | Age: 59
End: 2019-01-14

## 2019-01-14 ENCOUNTER — ANESTHESIA (OUTPATIENT)
Dept: PERIOP | Facility: HOSPITAL | Age: 59
End: 2019-01-14

## 2019-01-14 PROBLEM — G93.41 ACUTE METABOLIC ENCEPHALOPATHY: Status: ACTIVE | Noted: 2019-01-14

## 2019-01-14 LAB
25(OH)D3 SERPL-MCNC: 19.7 NG/ML
ALBUMIN SERPL-MCNC: 3.2 G/DL (ref 3.5–5)
ALBUMIN/GLOB SERPL: 0.9 G/DL (ref 1–2)
ALP SERPL-CCNC: 272 U/L (ref 38–126)
ALT SERPL W P-5'-P-CCNC: 27 U/L (ref 13–69)
AMMONIA BLD-SCNC: <9 UMOL/L (ref 9–30)
ANION GAP SERPL CALCULATED.3IONS-SCNC: 14.2 MMOL/L (ref 10–20)
ANISOCYTOSIS BLD QL: ABNORMAL
AST SERPL-CCNC: 16 U/L (ref 15–46)
BILIRUB SERPL-MCNC: 0.4 MG/DL (ref 0.2–1.3)
BUN BLD-MCNC: 82 MG/DL (ref 7–20)
BUN/CREAT SERPL: 20.5 (ref 7.1–23.5)
CALCIUM SPEC-SCNC: 7.9 MG/DL (ref 8.4–10.2)
CHLORIDE SERPL-SCNC: 109 MMOL/L (ref 98–107)
CO2 SERPL-SCNC: 19 MMOL/L (ref 26–30)
CREAT BLD-MCNC: 4 MG/DL (ref 0.6–1.3)
DEPRECATED RDW RBC AUTO: 60.2 FL (ref 37–54)
ERYTHROCYTE [DISTWIDTH] IN BLOOD BY AUTOMATED COUNT: 20.8 % (ref 11.5–14.5)
GFR SERPL CREATININE-BSD FRML MDRD: 11 ML/MIN/1.73
GFR SERPL CREATININE-BSD FRML MDRD: 14 ML/MIN/1.73
GLOBULIN UR ELPH-MCNC: 3.4 GM/DL
GLUCOSE BLD-MCNC: 181 MG/DL (ref 74–98)
GLUCOSE BLDC GLUCOMTR-MCNC: 150 MG/DL (ref 70–130)
GLUCOSE BLDC GLUCOMTR-MCNC: 169 MG/DL (ref 70–130)
GLUCOSE BLDC GLUCOMTR-MCNC: 175 MG/DL (ref 70–130)
GLUCOSE BLDC GLUCOMTR-MCNC: 201 MG/DL (ref 70–130)
GRAM STN SPEC: NORMAL
GRAM STN SPEC: NORMAL
HCT VFR BLD AUTO: 26.5 % (ref 37–47)
HGB BLD-MCNC: 8.2 G/DL (ref 12–16)
LYMPHOCYTES # BLD MANUAL: 1.9 10*3/MM3 (ref 0.6–3.4)
LYMPHOCYTES NFR BLD MANUAL: 10 % (ref 10–50)
LYMPHOCYTES NFR BLD MANUAL: 4 % (ref 0–12)
MAXIMAL PREDICTED HEART RATE: 160 BPM
MCH RBC QN AUTO: 24.9 PG (ref 27–31)
MCHC RBC AUTO-ENTMCNC: 30.9 G/DL (ref 30–37)
MCV RBC AUTO: 80.5 FL (ref 81–99)
MICROCYTES BLD QL: ABNORMAL
MONOCYTES # BLD AUTO: 0.76 10*3/MM3 (ref 0–0.9)
NEUTROPHILS # BLD AUTO: 16.31 10*3/MM3 (ref 2–6.9)
NEUTROPHILS NFR BLD MANUAL: 78 % (ref 37–80)
NEUTS BAND NFR BLD MANUAL: 8 % (ref 0–6)
PLATELET # BLD AUTO: 102 10*3/MM3 (ref 130–400)
PMV BLD AUTO: ABNORMAL FL (ref 6–12)
POTASSIUM BLD-SCNC: 4.2 MMOL/L (ref 3.5–5.1)
PROT SERPL-MCNC: 6.6 G/DL (ref 6.3–8.2)
RBC # BLD AUTO: 3.29 10*6/MM3 (ref 4.2–5.4)
SCAN SLIDE: NORMAL
SMALL PLATELETS BLD QL SMEAR: ABNORMAL
SODIUM BLD-SCNC: 138 MMOL/L (ref 137–145)
STRESS TARGET HR: 136 BPM
WBC MORPH BLD: NORMAL
WBC NRBC COR # BLD: 18.96 10*3/MM3 (ref 4.8–10.8)

## 2019-01-14 PROCEDURE — 25010000002 HYDROCORTISONE SODIUM SUCCINATE 100 MG RECONSTITUTED SOLUTION: Performed by: HOSPITALIST

## 2019-01-14 PROCEDURE — 82962 GLUCOSE BLOOD TEST: CPT

## 2019-01-14 PROCEDURE — 25010000002 PIPERACILLIN SOD-TAZOBACTAM PER 1 G: Performed by: INTERNAL MEDICINE

## 2019-01-14 PROCEDURE — 36415 COLL VENOUS BLD VENIPUNCTURE: CPT | Performed by: INTERNAL MEDICINE

## 2019-01-14 PROCEDURE — 87077 CULTURE AEROBIC IDENTIFY: CPT | Performed by: PODIATRIST

## 2019-01-14 PROCEDURE — 25010000002 HEPARIN (PORCINE) PER 1000 UNITS: Performed by: HOSPITALIST

## 2019-01-14 PROCEDURE — 25010000002 VANCOMYCIN 5 G RECONSTITUTED SOLUTION 5,000 MG VIAL: Performed by: INTERNAL MEDICINE

## 2019-01-14 PROCEDURE — 87075 CULTR BACTERIA EXCEPT BLOOD: CPT | Performed by: PODIATRIST

## 2019-01-14 PROCEDURE — 0JBQ0ZZ EXCISION OF RIGHT FOOT SUBCUTANEOUS TISSUE AND FASCIA, OPEN APPROACH: ICD-10-PCS | Performed by: PODIATRIST

## 2019-01-14 PROCEDURE — 87186 SC STD MICRODIL/AGAR DIL: CPT | Performed by: PODIATRIST

## 2019-01-14 PROCEDURE — 82140 ASSAY OF AMMONIA: CPT | Performed by: INTERNAL MEDICINE

## 2019-01-14 PROCEDURE — 15275 SKIN SUB GRAFT FACE/NK/HF/G: CPT | Performed by: PODIATRIST

## 2019-01-14 PROCEDURE — 85025 COMPLETE CBC W/AUTO DIFF WBC: CPT | Performed by: HOSPITALIST

## 2019-01-14 PROCEDURE — 87070 CULTURE OTHR SPECIMN AEROBIC: CPT | Performed by: PODIATRIST

## 2019-01-14 PROCEDURE — 93306 TTE W/DOPPLER COMPLETE: CPT

## 2019-01-14 PROCEDURE — 85007 BL SMEAR W/DIFF WBC COUNT: CPT | Performed by: HOSPITALIST

## 2019-01-14 PROCEDURE — 80053 COMPREHEN METABOLIC PANEL: CPT | Performed by: HOSPITALIST

## 2019-01-14 PROCEDURE — 25010000002 MEROPENEM IN SODIUM CHLORIDE 0.9% 50 ML 500 MG/50ML RECONSTITUTED SOLUTION: Performed by: HOSPITALIST

## 2019-01-14 PROCEDURE — 63710000001 INSULIN REGULAR HUMAN PER 5 UNITS: Performed by: HOSPITALIST

## 2019-01-14 PROCEDURE — 71045 X-RAY EXAM CHEST 1 VIEW: CPT

## 2019-01-14 PROCEDURE — 05H533Z INSERTION OF INFUSION DEVICE INTO RIGHT SUBCLAVIAN VEIN, PERCUTANEOUS APPROACH: ICD-10-PCS | Performed by: INTERNAL MEDICINE

## 2019-01-14 PROCEDURE — 99233 SBSQ HOSP IP/OBS HIGH 50: CPT | Performed by: INTERNAL MEDICINE

## 2019-01-14 PROCEDURE — 87205 SMEAR GRAM STAIN: CPT | Performed by: PODIATRIST

## 2019-01-14 PROCEDURE — 0YUM0KZ SUPPLEMENT RIGHT FOOT WITH NONAUTOLOGOUS TISSUE SUBSTITUTE, OPEN APPROACH: ICD-10-PCS | Performed by: PODIATRIST

## 2019-01-14 DEVICE — ALLOGRFT AMNIO AMNIOFIX 4X6CM: Type: IMPLANTABLE DEVICE | Site: FOOT | Status: FUNCTIONAL

## 2019-01-14 RX ORDER — MECLIZINE HYDROCHLORIDE 25 MG/1
TABLET ORAL
Qty: 30 TABLET | Refills: 5 | Status: SHIPPED | OUTPATIENT
Start: 2019-01-14 | End: 2019-03-06

## 2019-01-14 RX ORDER — L.ACID,PARA/B.BIFIDUM/S.THERM 8B CELL
1 CAPSULE ORAL DAILY
Status: DISCONTINUED | OUTPATIENT
Start: 2019-01-14 | End: 2019-01-22 | Stop reason: HOSPADM

## 2019-01-14 RX ORDER — LIDOCAINE HYDROCHLORIDE 10 MG/ML
INJECTION, SOLUTION INFILTRATION; PERINEURAL AS NEEDED
Status: DISCONTINUED | OUTPATIENT
Start: 2019-01-14 | End: 2019-01-14 | Stop reason: HOSPADM

## 2019-01-14 RX ORDER — LEVOTHYROXINE SODIUM ANHYDROUS 100 UG/5ML
100 INJECTION, POWDER, LYOPHILIZED, FOR SOLUTION INTRAVENOUS
Status: DISCONTINUED | OUTPATIENT
Start: 2019-01-14 | End: 2019-01-15 | Stop reason: SDUPTHER

## 2019-01-14 RX ORDER — BUPIVACAINE HYDROCHLORIDE 5 MG/ML
INJECTION, SOLUTION EPIDURAL; INTRACAUDAL AS NEEDED
Status: DISCONTINUED | OUTPATIENT
Start: 2019-01-14 | End: 2019-01-14 | Stop reason: HOSPADM

## 2019-01-14 RX ORDER — LEVOTHYROXINE SODIUM 0.1 MG/1
100 TABLET ORAL
Status: DISCONTINUED | OUTPATIENT
Start: 2019-01-15 | End: 2019-01-22 | Stop reason: HOSPADM

## 2019-01-14 RX ADMIN — MEROPENEM AND SODIUM CHLORIDE 500 MG: 500 INJECTION, SOLUTION INTRAVENOUS at 00:57

## 2019-01-14 RX ADMIN — TAZOBACTAM SODIUM AND PIPERACILLIN SODIUM 3.38 G: 375; 3 INJECTION, SOLUTION INTRAVENOUS at 21:00

## 2019-01-14 RX ADMIN — HYDROCORTISONE SODIUM SUCCINATE 50 MG: 100 INJECTION, POWDER, FOR SOLUTION INTRAMUSCULAR; INTRAVENOUS at 12:45

## 2019-01-14 RX ADMIN — SODIUM CHLORIDE, PRESERVATIVE FREE 3 ML: 5 INJECTION INTRAVENOUS at 21:07

## 2019-01-14 RX ADMIN — HUMAN INSULIN 2 UNITS: 100 INJECTION, SOLUTION SUBCUTANEOUS at 17:59

## 2019-01-14 RX ADMIN — Medication 1 CAPSULE: at 12:45

## 2019-01-14 RX ADMIN — HEPARIN SODIUM 5000 UNITS: 5000 INJECTION, SOLUTION INTRAVENOUS; SUBCUTANEOUS at 21:06

## 2019-01-14 RX ADMIN — BUMETANIDE 1 MG/HR: 0.25 INJECTION INTRAMUSCULAR; INTRAVENOUS at 17:54

## 2019-01-14 RX ADMIN — PANTOPRAZOLE SODIUM 40 MG: 40 INJECTION, POWDER, FOR SOLUTION INTRAVENOUS at 06:18

## 2019-01-14 RX ADMIN — HYDROCORTISONE SODIUM SUCCINATE 50 MG: 100 INJECTION, POWDER, FOR SOLUTION INTRAMUSCULAR; INTRAVENOUS at 00:57

## 2019-01-14 RX ADMIN — LEVOTHYROXINE SODIUM ANHYDROUS 100 MCG: 100 INJECTION, POWDER, LYOPHILIZED, FOR SOLUTION INTRAVENOUS at 12:46

## 2019-01-14 RX ADMIN — HUMAN INSULIN 2 UNITS: 100 INJECTION, SOLUTION SUBCUTANEOUS at 12:46

## 2019-01-14 RX ADMIN — SODIUM CHLORIDE, PRESERVATIVE FREE 3 ML: 5 INJECTION INTRAVENOUS at 08:44

## 2019-01-14 RX ADMIN — VANCOMYCIN HYDROCHLORIDE 1750 MG: 500 INJECTION, POWDER, LYOPHILIZED, FOR SOLUTION INTRAVENOUS at 12:45

## 2019-01-14 RX ADMIN — BUMETANIDE 1 MG/HR: 0.25 INJECTION INTRAMUSCULAR; INTRAVENOUS at 08:43

## 2019-01-14 RX ADMIN — LEVOTHYROXINE SODIUM 50 MCG: 50 TABLET ORAL at 06:17

## 2019-01-14 RX ADMIN — HUMAN INSULIN 2 UNITS: 100 INJECTION, SOLUTION SUBCUTANEOUS at 06:17

## 2019-01-14 RX ADMIN — FAMOTIDINE 20 MG: 10 INJECTION, SOLUTION INTRAVENOUS at 08:43

## 2019-01-14 RX ADMIN — BUMETANIDE 1 MG/HR: 0.25 INJECTION INTRAMUSCULAR; INTRAVENOUS at 13:36

## 2019-01-14 RX ADMIN — HUMAN INSULIN 3 UNITS: 100 INJECTION, SOLUTION SUBCUTANEOUS at 00:58

## 2019-01-14 RX ADMIN — HEPARIN SODIUM 5000 UNITS: 5000 INJECTION, SOLUTION INTRAVENOUS; SUBCUTANEOUS at 06:16

## 2019-01-14 NOTE — SIGNIFICANT NOTE
01/14/19 0947   Rehab Time/Intention   Evaluation Not Performed patient unavailable for evaluation  (Hold per MD; follow up tomorrow)   Rehab Treatment   Discipline occupational therapist

## 2019-01-14 NOTE — PROGRESS NOTES
Adult Nutrition  Assessment/PES    Patient Name:  Millicent Mckeon  YOB: 1960  MRN: [id number]  Admit Date:  1/10/2019    Assessment Date:  1/14/2019    Comments:  RD received consult for Tube feeding recommendations. Rec. #1: Initiate tube feeding once placement/patency verified. Novasource Renal at 25 ml/hr advancing as tolerated to 31 ml/hr goal rate. Tube feeding will provide: ~1364 Kcal, 61.86 gm Protein and 489 ml tube feeding water. Rec. #2: Free water flushes 146 ml Q 4 hours. Rec. #3: ProStat 30 ml BID via feeding tube. ProStat to provide an additional 200 Kcal and 30 gm Protein. Rec. #4: Consider adding renal MVI once medically feasible. RD to follow pt. Consult RD PRN.     Reason for Assessment     Row Name 01/14/19 1058          Reason for Assessment    Reason For Assessment  follow-up protocol;TF/PN;physician consult     Diagnosis  cardiac disease;diabetes diagnosis/complications;fluid status;other (see comments);metabolic state Cellulitis, DM2, AMS, Bilateral Edema, Morbidly Obese, CKD, Sepsis, Metabolic encephalopathy, hypothyroidism, right heel ulcer, microcytic anemia     Identified At Risk by Screening Criteria  BMI;large or nonhealing wound, burn or pressure injury;MST SCORE 2+             Labs/Tests/Procedures/Meds     Row Name 01/14/19 1100          Labs/Procedures/Meds    Lab Results Reviewed  reviewed, pertinent     Lab Results Comments  High: Gluc, Mg, Cl, BUN, Cr, Phosporus  Low: Albumin        Medications    Pertinent Medications Reviewed  reviewed         Physical Findings     Row Name 01/14/19 1101          Physical Findings    Overall Physical Appearance  overweight     Tubes  nasogastric tube     Skin  non-healing wound(s)           Nutrition Prescription Ordered     Row Name 01/14/19 1104          Nutrition Prescription PO    Current PO Diet  NPO        Nutrition Prescription EN    Enteral Route  NG         Evaluation of Received Nutrient/Fluid Intake     Row Name  01/14/19 1104          PO Evaluation    Number of Days PO Intake Evaluated  Insufficient Data NPO x 4 days               Problem/Interventions:  Problem 1     Row Name 01/14/19 1105          Nutrition Diagnoses Problem 1    Problem 1  Overweight/Obesity     Etiology (related to)  Factors Affecting Nutrition     Food Habit/Preferences  Large Meals     Signs/Symptoms (evidenced by)  BMI     BMI  Greater than 40         Problem 2     Row Name 01/14/19 1105          Nutrition Diagnoses Problem 2    Problem 2  Impaired Nutrient Utilization     Etiology (related to)  Medical Diagnosis     Endocrine  DM Type 2     Renal  CKD     Signs/Symptoms (evidenced by)  Biochemical     Specific Labs Noted  Glucose;BUN;Creatinine;Mg++;Phosphorus;Chloride         Problem 3     Row Name 01/14/19 1105          Nutrition Diagnoses Problem 3    Problem 3  Inadequate Intake/Infusion     Inadequate Intake Type  Oral     Macronutrient  Kcal;Carbohydrate;Fluid;Protein     Micronutrient  Vitamin;Mineral     Etiology (related to)  MNT for Treatment/Condition     Signs/Symptoms (evidenced by)  NPO         Problem 4     Row Name 01/14/19 1105          Nutrition Diagnoses Problem 4    Problem 4  Increased Nutrient Needs     Etiology (related to)  Medical Diagnosis     Infectious Disease  Sepsis     Skin  Non healing wound     Signs/Symptoms (evidenced by)  Other (comment) wound healing, sepsis diagnosis           Intervention Goal     Row Name 01/14/19 1127          Intervention Goal    General  Meet nutritional needs for age/condition     PO  Meet estimated needs     TF/PN  Inititiate TF/PN     Transition  TF to PO         Nutrition Intervention     Row Name 01/14/19 1140          Nutrition Intervention    RD/Tech Action  Follow Tx progress;Recommend/ordered     Recommended/Ordered  EN         Nutrition Prescription     Row Name 01/14/19 1146          Nutrition Prescription PO    PO Prescription  -- continue NPO due to AMS        Nutrition  Prescription EN    Enteral Prescription  Enteral begin/change     Enteral Route  NG     Product  Novasource Renal     TF Delivery Method  Continuous     Continuous TF Goal Rate (mL/hr)  31 mL/hr     Continuous TF Starting Rate (mL/hr)  25 mL/hr     Continuous TF Goal Volume (mL)  682 mL     Continuous TF Starting Volume (mL)  550 mL     Water flush (mL)   146 mL     Water Flush Frequency  Every 4 hours     New EN Prescription Ordered?  Yes        Other Orders    Supplement  Vitamin mineral supplement renal MVI     Supplement Ordered?  No, recommended         Education/Evaluation     Row Name 01/14/19 1158          Education    Education  Education not appropriate at this time     Please explain  Patient unresponsive        Monitor/Evaluation    Monitor  Per protocol;I&O;Supplement intake;Pertinent labs;Weight;Skin status;TF delivery/tolerance           Electronically signed by:  Amelia Faria RD  01/14/19 12:42 PM

## 2019-01-14 NOTE — OP NOTE
PREOPERATIVE DIAGNOSIS:   Right heel wound, multiple medical comorbidities, sepsis, altered mental status    POSTOPERATIVE DIAGNOSIS: Same    PROCEDURE PERFORMED:    1.  Right heel incision and drainage/wound irrigation and debridement with graft application, CPT code 97264  SURGEON:  Ar Rueda DPM    ANESTHESIA:  Mac    HEMOSTASIS: None     EBL:  Less than 30 mL's    SPECIMENS:  Soft tissue resected from the wound was sent to microbiology    COMPLICATIONS:  None    MATERIALS:  4 x 4 amnio fix graft, one quarter inch iodoform packing    INDICATIONS FOR PROCEDURE:  Millicent is a 58-year-old female who was admitted to [facility] on January 10, 2019 for altered mental status.  She is admitted to the ICU and managed per the hospitalist service.  She is noted to have a wound to the right heel along with significant lower extremity/leg weeping wounds secondary to lymphedema and venous stasis.  Patient's complete medical history is unknown or unclear.  To my knowledge she has had no family with her since ordering admission.  She has remained nonverbal and nonresponsive other than to pain.  She's had a fairly significant workup per the hospitalist service for her altered mental status with no definitive diagnosis to this point.  There is concern for the right heel wound being potential cause of infection and sepsis.  I had hoped to have the patient improved and her potentially stabilize over the last few days before considering surgery but she seems as if she is decompensated and her white blood count is elevated along with other comorbidities so after discussing the hospitalist service I plan for I&D/wound debridement to help determine the potential of this being a problematic.  We were unable to discuss with the patient however in this particular instance in my opinion this procedure could be considered emergent.  All other care providers were in agreement and fell Kathie procedure was necessary at this time.   The patient was seen prior to entering the operative suite and the correct surgical side was marked.  DESCRIPTION OF PROCEDURE:    The patient was brought straight from the intensive care unit to the operating room and left on her intensive care bed.  The right lower extremity was scrubbed and cleaned and draped in usual aseptic manner.  A timeout was performed identifying the correct surgical patient, procedure, side.  On physical exam the patient had as previously mentioned significant weeping and drainage along with significant abnormality circumferentially around both legs.  She is also extremely morbidly obese with BMI over 60.  He had a right foot showed no sign of neurovascular compromise.  It was warm.  No pedal hair noted a pulses are palpable.  There is no surrounding edema or erythema or ascending cellulitis or lymphangitis.  There is a lack/brown full-thickness necrotic wound to the plantar aspect and slightly lateral aspect of the right heel.  It was malodorous with drainage and what appeared to be dead devitalized tissue.  It seemed as if it may undermining and tunneling track prior to debridement as well.  There was no softness or fluctuance or bogginess to suggest abscess.  We were unable to obtain any preoperative imaging due to the patient's body habitus.    After lower extremity was cleaned and scrubbed timeout was performed.  There with a 15 blade full-thickness excisional wound debridement was performed of the plantar and lateral right heel.  This was performed excisionally full-thickness down to the layer just over the periosteum of the calcaneus.  There is large amounts of dead liquefied fat and fatty necrosis.  This was noted to track slightly posterior laterally of the rear foot towards the ankle.  I removed a large amount of underlying subcutaneous fat which was dead and nonviable.  There is a very thin layer of soft tissue covering the calcaneus itself which is concerning for calcaneal  osteomyelitis.  Once all the dead devitalized tissue was removed of the wound measured 4.5 x 4.5 cm with 1-1-1/2 cm of depth.  Given the potential chance of underlying infection I was hesitant to perform any type of calcaneal biopsy or partial calcanectomy due to the potential chance for a spread of infection so I elected to keep the soft tissue coverage intact for the time being.  I irrigated the wound with 3 L of bacitracin mixed normal sterile saline.  She seemed to bleed well and showed good perfusion.  After the wound was cleaned I packed the wound with a 4 x 4 amnio fix graft followed by Betadine soaked iodoform packing and a very bulky absorbent dressing consisting of 4 x 4's ABDs pads Kerlix and Ace wrap.  Portions of excise soft tissue were sent to microbiology for specimens.  We will await culture results and based off organisms and may consider additional surgery to debride the calcaneus but based off what I see it doesn't appear to extend past the heel and should be fairly amenable to wound VAC and grafting.  Based off what I see intraoperatively I have very low suspicion of this being a significant cause of sepsis or underlying infection of this nature.  Seems to be fairly well isolated to just the heel.  Patient was transferred from operating room back to the intensive care unit.

## 2019-01-14 NOTE — BRIEF OP NOTE
Progress Note    Millicent Mckeon  1/14/2019    Pre-op Diagnosis:   Right heel wound       Post-Op Diagnosis Codes:   Same    Procedure/CPT® Codes:  1.  Right heel incision and drainage/wound irrigation and debridement with graft application, CPT code 23714    Procedure(s):  Right heel incision and drainage, any indicated procedures    Surgeon(s):  Ar Rueda DPM    Anesthesia: Monitor Anesthesia Care    Staff:   Circulator: Shiva Fox RN  Scrub Person: Deer LodgeMichelle    Estimated Blood Loss: <500ml    Urine Voided: * No values recorded between  and 1/14/2019  1:32 PM *    Specimens:                A: Pieces and resected soft tissue were sent to microbiology      Drains:   NG/OG Tube Nasogastric 16 Fr Right nostril (Active)   Placement Verification X-ray 1/14/2019  4:00 AM   Site Assessment Clean;Dry;Intact 1/14/2019  4:00 AM   Securement anchored to nostril center with adhesive device 1/14/2019  4:00 AM   Drainage Appearance None 1/13/2019  6:00 PM   Surrounding Skin Intact 1/14/2019  4:00 AM   Flush/ Irrigation Intake (mL) 60 1/14/2019  6:00 AM       Urethral Catheter 16 Fr. (Active)   Daily Indications Monitoring of strict I &O 1/14/2019  6:00 AM   Site Assessment Clean;Skin intact 1/14/2019  6:00 AM   Collection Container Standard drainage bag 1/14/2019  6:00 AM   Securement Method Securing device 1/14/2019  6:00 AM   Catheter care complete Yes 1/14/2019  6:00 AM   Irrigation/Instillation Indication patency maintenance 1/12/2019  8:00 AM   Output (mL) 700 mL 1/14/2019  1:00 PM       Findings: Per dictation    Complications: None      Ar Rueda DPM     Date: 1/14/2019  Time: 1:32 PM

## 2019-01-14 NOTE — ANESTHESIA PREPROCEDURE EVALUATION
Anesthesia Evaluation     Patient summary reviewed and Nursing notes reviewed   no history of anesthetic complications:  NPO Solid Status: > 8 hours  NPO Liquid Status: > 8 hours           Airway   Mallampati: II  TM distance: >3 FB  Neck ROM: full  no difficulty expected  Dental - normal exam     Pulmonary - negative pulmonary ROS and normal exam   Cardiovascular - normal exam    Rhythm: regular  Rate: normal    (+) hypertension well controlled less than 2 medications,       Neuro/Psych- negative ROS  GI/Hepatic/Renal/Endo    (+) obesity, morbid obesity, GERD,  renal disease CRI, diabetes mellitus type 2 using insulin, hypothyroidism,     Musculoskeletal (-) negative ROS    Abdominal    Substance History - negative use     OB/GYN negative ob/gyn ROS         Other - negative ROS       ROS/Med Hx Other: Bedside ECHO done this am  Elevated Bun/Creatinine. Pt to start dialysis tomorrow.  Pt nonverbal, non arrousable                  Anesthesia Plan    ASA 3     MAC   (Pt told that intravenous sedation will be used as the primary anesthetic along with local anesthesia if necessary. Every effort will be made to make sure the patient is comfortable.     The patient was told they may or may not have recall for the procedure. It was further explained that if the MAC was not adequate that a general anesthetic with either an LMA or endotracheal tube would be required.     Will proceed with the plan of care.)  intravenous induction   Anesthetic plan, all risks, benefits, and alternatives have been provided, discussed and informed consent has been obtained with: patient.

## 2019-01-14 NOTE — PROCEDURES
Procedure Performed:  Dialysis catheter placement.    Indication: Need for emergent dialysis.    Procedure:    Access Was Obtained to the Right Subclavian Vein with a Single Needle Stick.  The Path Was Dilated with the Dilator.  A 13 [place] 15 Cm Dialysis Catheter Was Placed.  All the Ports Where Aspirated and Flushed.  Post Placement Chest X-Ray Has Been Ordered.    Conclusions:    Successful Placement of a Triple-Lumen 13 [place] Dialysis Catheter in the Right Subclavian Vein by the Bedside.

## 2019-01-14 NOTE — PROGRESS NOTES
"Nephrology Progress Note.    LOS: 4 days    Patient Care Team:  Rosa Zamora MD as PCP - General (Internal Medicine)  Geremias Shepherd MD as Consulting Physician (General Surgery)    Chief Complaint:  No chief complaint on file.      Subjective     Follow up for FREDDY and related issues.    Interval History:     Patient Complaints: none    Patient seen and examined this morning.  Events from last night noted.  Patient still pretty much unresponsive.  History taken from: patient    I have reviewed all of the labs since admission, imaging records, from this hospitalization.  I also reviewed ER staff and admitting/attending physicians H/P's and progress notes to establish a comprehensive understanding of this patient's clinical hospital course, as well as to establish plan of care appropriately.     Review of Systems:    Patient is unresponsive no meaningful review of system is possible.      Objective     Vital Signs  /75   Pulse 84   Temp 95 °F (35 °C) (Oral)   Resp 18   Ht 165.1 cm (65\")   Wt (!) 171 kg (375 lb 14.4 oz)   SpO2 100%   BMI 62.55 kg/m²       No intake/output data recorded.    Intake/Output Summary (Last 24 hours) at 1/14/2019 0819  Last data filed at 1/14/2019 0600  Gross per 24 hour   Intake 850 ml   Output 5100 ml   Net -4250 ml       Physical Exam:    General Appearance:  no acute distress,   HEENT: Oral mucosa dry, extra occular movements intact. Sclera clear.  Skin: Warm and dry  Neck: supple, no JVD, trachea midline  Lungs:Chest shape is normal. Breath sounds heard bilaterally equally. No crackles, No wheezing.   Heart: regular rate and rhythm. normal S1 and S2, no S3, no rub, peripheral pulses weak but palpable.  Abdomen: Obese, soft, non-tender,  present bowel sounds to auscultation  : no palpable bladder.  Extremities: 4+ edema, no cyanosis or clubbing.   Neuro: Unable to evaluate     Results Review:    Results from last 7 days   Lab Units  01/14/19   0414  01/13/19   0423  " 01/12/19   1558   01/11/19 0419   SODIUM mmol/L  138  138  137   < >  135*   POTASSIUM mmol/L  4.2  4.8  5.3*   < >  5.7*   CHLORIDE mmol/L  109*  108*  108*   < >  106   CO2 mmol/L  19.0*  18.0*  19.0*   < >  19.0*   BUN mg/dL  82*  73*  64*   < >  50*   CREATININE mg/dL  4.00*  3.80*  3.80*   < >  3.50*   CALCIUM mg/dL  7.9*  8.1*  8.0*   < >  8.0*   BILIRUBIN mg/dL  0.4  0.4   --    --   0.4   ALK PHOS U/L  272*  323*   --    --   305*   ALT (SGPT) U/L  27  34   --    --   37   AST (SGOT) U/L  16  21   --    --   28   GLUCOSE mg/dL  181*  202*  216*   < >  139*    < > = values in this interval not displayed.       Estimated Creatinine Clearance: 24.3 mL/min (A) (by C-G formula based on SCr of 4 mg/dL (H)).    Results from last 7 days   Lab Units  01/12/19   0431  01/11/19   0419  01/10/19   2110   MAGNESIUM mg/dL   --   2.6*  2.5*   PHOSPHORUS mg/dL  7.9*  7.4*  6.8*       Results from last 7 days   Lab Units  01/12/19   0431   URIC ACID mg/dL  4.4       Results from last 7 days   Lab Units  01/14/19   0414  01/13/19   0423  01/12/19   0431  01/11/19   0419  01/10/19   2112   WBC 10*3/mm3  18.96*  22.61*  20.07*  18.35*  16.35*   HEMOGLOBIN g/dL  8.2*  8.6*  8.2*  7.6*  7.4*   PLATELETS 10*3/mm3  102*  131  133  142  140       Results from last 7 days   Lab Units  01/13/19   0901  01/10/19   2112   INR   1.41*  1.57*         Imaging Results (last 24 hours)     Procedure Component Value Units Date/Time    CT Abdomen Pelvis Without Contrast [[id number]] Collected:  01/13/19 1222     Updated:  01/13/19 1227    Narrative:       PROCEDURE: CT ABDOMEN PELVIS WO CONTRAST-     HISTORY: eval adenopathy; hypoactive bowel sounds, eval for ischemic  changes     COMPARISON: None.     PROCEDURE: Axial images were obtained from the lung bases to the pubic  symphysis by computed tomography.     This study was performed with  techniques to keep radiation doses as low as reasonably achievable,  (ALARA). Individualized dose  reduction techniques using automated  exposure control or adjustment of mA and/or kV according to the patient  size were employed.     FINDINGS:      ABDOMEN: Small bilateral pleural effusions with mild adjacent lower lobe  atelectasis noted. The heart is enlarged with vascular congestion. NG  tube present with tip in the stomach. The limited non-contrast images of  the liver are unremarkable. Small amount of perihepatic fluid  identified. Gallbladder is distended with no definite wall thickening or  CT visible stones. The spleen mildly enlarged with no focal abnormality.  No adrenal masses are seen. The aorta is normal in caliber. Vascular  calcifications noted. Shotty periaortic adenopathy identified. There is  no nephrolithiasis. There is no hydronephrosis.         PELVIS: The GI tract demonstrate no obstruction. The appendix is not  identified. The urinary bladder is completely collapsed by Wilkinson  catheter. Uterus is midline. There is a small amount of free fluid in  the pelvis.      Extensive infiltration of the subcutaneous fat of  the abdominal wall  bilaterally, right greater than left identified, edema versus infection.  No bowel wall thickening identified.       Impression:       Small amount of ascites and extensive infiltration of the  subcutaneous fat predominantly of the anterior and lateral abdominal  wall, right greater than left, consider edema versus infection.     Distended gallbladder with no CT visible stones identified.     This report was finalized on 1/13/2019 12:25 PM by Cookie Abarca MD.    CT Chest Without Contrast [[id number]] Collected:  01/13/19 1223     Updated:  01/13/19 1227    Narrative:       PROCEDURE: CT CHEST WO CONTRAST-     HISTORY: altered mental status, change in respiration     COMPARISON: None.     PROCEDURE: Axial images were obtained from the lung apex to the mid  abdomen by computed tomography. This study was performed with techniques  to keep radiation doses as low as  reasonably achievable, (ALARA).  Individualized dose reduction techniques using automated exposure  control or adjustment of mA and/or kV according to the patient size were  employed.     FINDINGS:      CHEST: There is no axillary adenopathy. There is no hilar or mediastinal  adenopathy. The heart is enlarged with vascular congestion. There is no  pericardial effusion. There are small, bilateral pleural effusions with  very mild, adjacent lower lobe atelectasis bilaterally. Limited images  of the upper abdomen are unremarkable. No suspicious infiltrate or  nodule identified. NG tube present with tip in the antrum of the  stomach. Motion artifact present on some images. Infiltration of the  subcutaneous fat present bilaterally more pronounced on the right,  overlying the inferior chest and abdomen.       Impression:       Cardiomegaly with vascular congestion and small bilateral  pleural effusions suggesting mild congestive heart failure.     Infiltration of the subcutaneous fat, more prominent on the right and  extending into the right abdominal wall.     NG tube present.           This report was finalized on 1/13/2019 12:25 PM by Cookie Abarca MD.    CT Head Without Contrast [[id number]] Collected:  01/13/19 1149     Updated:  01/13/19 1154    Narrative:       PROCEDURE: CT HEAD WO CONTRAST-     HISTORY: altered mental status; not moving right side as much, nystagmus     COMPARISON: None.     TECHNIQUE: Multiple axial CT images were performed from the foramen  magnum to the vertex. Individualized dose reduction techniques using  automated exposure control or adjustment of mA and/or kV according to  the patient size were employed.     FINDINGS: The brain parenchyma is unremarkable.  The ventricles are  proper size. There is no evidence of hemorrhage. No masses are  identified. No abnormal extra-axial fluid is seen. The paranasal sinuses  and mastoid air cells are unremarkable. Nasal cannula identified.        Impression:       No acute intracranial process.             This report was finalized on 1/13/2019 11:52 AM by Cookie Abarca MD.          DAPTOmycin 6 mg/kg (Adjusted) Intravenous Q48H   famotidine 20 mg Intravenous Daily   heparin (porcine) 5,000 Units Subcutaneous Q8H   hydrocortisone sodium succinate 50 mg Intravenous Q12H   insulin regular 0-7 Units Subcutaneous Q6H   levothyroxine 50 mcg Oral Q AM   meropenem 500 mg Intravenous Q12H   pantoprazole 40 mg Intravenous Q AM   sodium chloride 3 mL Intravenous Q12H       bumetanide 1 mg/hr Last Rate: 1 mg/hr (01/13/19 2122)   Pharmacy to Dose meropenem (MERREM)     Sodium chloride 25 mL/hr Last Rate: 25 mL/hr (01/13/19 1510)       Medication Review:   Current Facility-Administered Medications   Medication Dose Route Frequency Provider Last Rate Last Dose   • acetaminophen (TYLENOL) tablet 650 mg  650 mg Oral Q4H PRN Milad Leone MD, SHANIN       • bumetanide (BUMEX) 10 mg in Sodium chloride 0.9 % 100 mL (0.1 mg/mL) infusion  1 mg/hr Intravenous Continuous Benjamin Cruz MD 10 mL/hr at 01/13/19 2122 1 mg/hr at 01/13/19 2122   • DAPTOmycin (CUBICIN) 600 mg in Sodium chloride 0.9 % 50 mL IVPB  6 mg/kg (Adjusted) Intravenous Q48H Liz Gregg  mL/hr at 01/12/19 1400 600 mg at 01/12/19 1400   • dextrose (D50W) 25 g/ 50mL Intravenous Solution 25 g  25 g Intravenous Q15 Min PRN Milad Leone MD, FASN       • dextrose (GLUTOSE) oral gel 1 tube  1 tube Oral Q15 Min PRN Milad Leone MD, SHANIN       • famotidine (PEPCID) injection 20 mg  20 mg Intravenous Daily Liz Gregg MD   20 mg at 01/13/19 0912   • glucagon (human recombinant) (GLUCAGEN DIAGNOSTIC) injection 1 mg  1 mg Subcutaneous PRN Milad Leone MD, SHANIN       • heparin (porcine) 5000 UNIT/ML injection 5,000 Units  5,000 Units Subcutaneous Q8H Liz Gregg MD   5,000 Units at 01/14/19 0616   • hydrALAZINE (APRESOLINE) injection 10 mg  10 mg Intravenous Q6H PRN Marysol,  MD Liz   10 mg at 01/13/19 0920   • hydrocortisone sodium succinate (Solu-CORTEF) injection 50 mg  50 mg Intravenous Q12H Liz Gregg MD   50 mg at 01/14/19 0057   • insulin regular (humuLIN R,novoLIN R) injection 0-7 Units  0-7 Units Subcutaneous Q6H Liz Gregg MD   2 Units at 01/14/19 0617   • levothyroxine (SYNTHROID, LEVOTHROID) tablet 50 mcg  50 mcg Oral Q AM Milad Leone MD, FASN   50 mcg at 01/14/19 0617   • meropenem in sodium chloride 0.9% 50 mL (MERREM) IVPB 500 mg  500 mg Intravenous Q12H Liz Gregg MD   500 mg at 01/14/19 0057   • Morphine sulfate (PF) injection 2 mg  2 mg Intravenous Q4H PRN Milad Leone MD, FASN        And   • naloxone (NARCAN) injection 0.4 mg  0.4 mg Intravenous Q5 Min PRN Milad Leone MD, FASN       • ondansetron (ZOFRAN) tablet 4 mg  4 mg Oral Q6H PRN Milad Leone MD, FASN        Or   • ondansetron ODT (ZOFRAN-ODT) disintegrating tablet 4 mg  4 mg Oral Q6H PRN Milad Leone MD, FASN        Or   • ondansetron (ZOFRAN) injection 4 mg  4 mg Intravenous Q6H PRN Milad Leone MD, FASN       • pantoprazole (PROTONIX) injection 40 mg  40 mg Intravenous Q AM Milad Leone MD, FASN   40 mg at 01/14/19 0618   • Pharmacy to Dose meropenem (MERREM)   Does not apply Continuous PRN Liz Gregg MD       • sodium chloride 0.9 % flush 1-10 mL  1-10 mL Intravenous PRN Milad Leone MD, FASN       • sodium chloride 0.9 % flush 3 mL  3 mL Intravenous Q12H Milad Leone MD, FASN   3 mL at 01/12/19 0850   • Sodium chloride 0.9 % infusion  25 mL/hr Intravenous Continuous Liz Gregg MD 25 mL/hr at 01/13/19 1510 25 mL/hr at 01/13/19 1510   • zinc oxide 13 % cream   Topical BID PRN Milad Leone MD, FASN   1 application at 01/12/19 0414       Assessment/Plan         1.   Acute on chronic renal failure (CMS/HCC): It is likely secondary to hypotensive episodes as well as the use of Bactrim.  There is possibility that she may be  taking nonsteroidals and other hemodynamic abnormality is causing the current problem.  There is no bloody around at this time who can help make the decisions or tell us what was done at home.  Finally I have came to know about that she has a blind sister both of them try to work together to take care of each other.  Her sister is on dialysis and is my patient.  2.   Cellulitis of both lower extremities: Continue with the IV antibiotics.  She likely has an abscess on the foot that needs to be addressed  3.   Altered mental status: There is minimal improvement in her mental status.  4.   Anemia: Transfused as needed  5.   Bilateral edema of lower extremity:  she needs a central line placed the temporary dialysis catheter and use it as a central line and likely will need dialysis in the morning.  6.   GERD (gastroesophageal reflux disease)  7.   Hyperkalemia:  it is resolved at this point  8.   Hypertension: Under fair control  9.   Hypothermia  10.   Hypothyroidism  11.   Type 2 diabetes mellitus with complication (CMS/[facility])  12.   Vitamin B12 deficiency    Plan:    · Continue with Bumex drip.  · Continue with the IV antibiotics  · We'll reassess her for dialysis tomorrow.  · Surgical intervention planned for later today.  · Details were also discussed with the hospitalist service.  Have discussed the issues with the sister and she wants to be aggressive wants to do it everything for now.  · Surveillance labs.  · Further recommendations will depend on clinical course of the patient during the current hospitalization.    · I also discussed the details with the nursing staff.  · Rest as ordered.    Milad Leone MD, SHANIN  01/14/19  8:19 AM    Dictated utilizing Dragon dictation.

## 2019-01-14 NOTE — PROGRESS NOTES
"Pharmacokinetic Initial Note - Vancomycin    Millicnet Mckeon is a 58 y.o. female  165.1 cm (65\") (!) 171 kg (375 lb 14.4 oz)    Indication for use: Skin and soft tissue infection, sepsis    Results from last 7 days   Lab Units  01/14/19   0414  01/13/19   0423  01/12/19   1558  01/12/19   0431   WBC 10*3/mm3  18.96*  22.61*   --   20.07*   CREATININE mg/dL  4.00*  3.80*  3.80*  3.70*      Estimated Creatinine Clearance: 24.3 mL/min (A) (by C-G formula based on SCr of 4 mg/dL (H)).  Temp Readings from Last 1 Encounters:   01/13/19 95 °F (35 °C)       Culture results  Microbiology Results (last 10 days)       Procedure Component Value - Date/Time    Blood Culture With LYDIA - Blood, Arm, Left [[id number]] Collected:  01/11/19 0744    Lab Status:  Preliminary result Specimen:  Blood from Arm, Left Updated:  01/14/19 0800     Blood Culture No growth at 3 days    Blood Culture With LYDIA - Blood, Arm, Right [[id number]] Collected:  01/11/19 0735    Lab Status:  Preliminary result Specimen:  Blood from Arm, Right Updated:  01/14/19 0800     Blood Culture No growth at 3 days    Hardware / Foreign Body Culture - Hardware / Foreign Body, Foot, Right [[id number]]  (Abnormal)  (Susceptibility) Collected:  01/10/19 2337    Lab Status:  Preliminary result Specimen:  Hardware / Foreign Body from Foot, Right Updated:  01/14/19 1012     Hardware / Foreign Body Culture Heavy growth (4+) Proteus vulgaris     Comment: Identification and ABHILASH to follow.           Gram Negative Bacilli     Gram Stain Moderate (3+) Gram positive cocci in pairs      Moderate (3+) Gram negative bacilli      Rare (1+) WBCs seen    Susceptibility        Proteus vulgaris     ABHILASH     Amikacin Susceptible     Ampicillin Resistant     Ampicillin + Sulbactam Susceptible     Aztreonam Susceptible     Cefazolin Resistant     Cefepime Susceptible     Cefotaxime Susceptible     Cefoxitin Susceptible     Ceftazidime Susceptible     Ceftriaxone Intermediate     Cefuroxime " sodium Resistant     Ciprofloxacin Susceptible     Doripenem Susceptible     Ertapenem Susceptible     Gentamicin Susceptible     Levofloxacin Susceptible     Meropenem Susceptible     Piperacillin + Tazobactam Susceptible     Tetracycline Resistant     Tigecycline Resistant     Tobramycin Susceptible     Trimethoprim + Sulfamethoxazole Resistant                      MRSA Screen Culture - Swab, Nares [[id number]]  (Normal) Collected:  01/10/19 2318    Lab Status:  Final result Specimen:  Swab from Nares Updated:  01/13/19 0606     MRSA SCREEN CX No Methicillin Resistant Staphylococcus aureus isolated    VRE Culture - Swab, Per Rectum [[id number]]  (Normal) Collected:  01/10/19 2318    Lab Status:  Final result Specimen:  Swab from Per Rectum Updated:  01/13/19 0801     VRE SCREEN CX No Vancomycin Resistant Enterococcus Isolated    Acinetobacter Screen - Swab, Nares [[id number]]  (Normal) Collected:  01/10/19 2318    Lab Status:  Final result Specimen:  Swab from Nares Updated:  01/13/19 0606     ACINETOBACTER SCREEN CX No Acinetobacter isolated    Urine Culture - Urine, Urine, Clean Catch [[id number]] Collected:  01/10/19 2313    Lab Status:  Final result Specimen:  Urine, Clean Catch Updated:  01/13/19 0553     Urine Culture Mixed Kati Isolated    Narrative:       Specimen contains mixed organisms of questionable pathogenicity which indicates contamination with commensal kati.  Further identification is unlikely to provide clinically useful information.  Suggest recollection.            Other Antimicrobials  Zosyn 3.375 g IV q12h    Assessment/Plan  Initiated Vancomycin 1750 mg (10 mg/kg) IVPB once. Patient potentially has vancomycin in system from dose on 1/11 d/t reduced clearance. Dialysis anticipated tomorrow. Will order level with morning labs to assess need for maintenance dose. Pharmacy will monitor renal function and adjust dose accordingly.    Reginaldo Thakur [facility]  01/14/19 10:41 AM

## 2019-01-14 NOTE — PROGRESS NOTES
Continued Stay Note  SRINI Hdz     Patient Name: Millicent Mckeon  MRN: [id number]  Today's Date: 1/14/2019    Admit Date: 1/10/2019    Discharge Plan     Row Name 01/14/19 0956       Plan    Plan Comments Pt continues with unresponsiveness. Not appropriate to speak with at this time. Will continue to follow-up for discharge planning when appropriate.         Discharge Codes    No documentation.       Expected Discharge Date and Time     Expected Discharge Date Expected Discharge Time    Jan 17, 2019             HERB Cordoba  9:58 AM  01/14/19

## 2019-01-14 NOTE — PLAN OF CARE
Problem: Fall Risk (Adult)  Intervention: Monitor/Assist with Self Care   01/14/19 0516   Activity   Activity Assistance Provided assistance, 2 people     Intervention: Reduce Risk/Promote Restraint Free Environment   01/14/19 0400   Safety Management   Environmental Safety Modification assistive device/personal items within reach;lighting adjusted;room near unit station   Safety Promotion/Fall Prevention activity supervised;safety round/check completed     Intervention: Review Medications/Identify Contributors to Fall Risk   01/14/19 0400   Safety Management   Medication Review/Management medications reviewed       Goal: Identify Related Risk Factors and Signs and Symptoms  Outcome: Ongoing (interventions implemented as appropriate)   01/14/19 0516   Fall Risk (Adult)   Related Risk Factors (Fall Risk) confusion/agitation;fatigue/slow reaction;gait/mobility problems;history of falls;neuro disease/injury;sensory deficits;environment unfamiliar   Signs and Symptoms (Fall Risk) presence of risk factors     Goal: Absence of Fall  Outcome: Ongoing (interventions implemented as appropriate)   01/14/19 0516   Fall Risk (Adult)   Absence of Fall making progress toward outcome       Problem: Skin Injury Risk (Adult)  Intervention: Prevent/Manage Excess Moisture   01/13/19 2100 01/13/19 2200   Hygiene Care   Perineal Care catheter care provided;perineum cleansed;protective cream applied --    Bathing/Skin Care bath, complete;dressed/undressed --    Skin Interventions   Skin Protection --  adhesive use limited;incontinence pads utilized;transparent dressing maintained;tubing/devices free from skin contact     Intervention: Maintain Head of Bed Elevation Less Than 30 Degrees as Tolerated   01/14/19 0516   Positioning   Head of Bed (HOB) HOB lowered      01/14/19 0516   Positioning   Head of Bed (HOB) HOB lowered     Intervention: Prevent/Minimize Shear/Friction Injuries   01/14/19 0400 01/14/19 0516   Positioning    Positioning/Transfer Devices pillows;in use --    Skin Interventions   Pressure Reduction Devices --  heel offloading device utilized;positioning supports utilized;pressure-redistributing mattress utilized     Intervention: Prevent or Minimize Pressure   01/14/19 0400 01/14/19 0516   Positioning   Body Position supine, legs elevated;upper extremity elevated, left;upper extremity elevated, right;weight shift assistance provided;other (see comments)  (bed in rotation mode) --    Skin Interventions   Pressure Reduction Techniques --  heels elevated off bed;weight shift assistance provided       Goal: Identify Related Risk Factors and Signs and Symptoms  Outcome: Ongoing (interventions implemented as appropriate)   01/14/19 0516   Skin Injury Risk (Adult)   Related Risk Factors (Skin Injury Risk) critical care admission;mechanical forces;medical devices;medication;mobility impaired;tissue perfusion altered     Goal: Skin Health and Integrity  Outcome: Ongoing (interventions implemented as appropriate)   01/14/19 0516   Skin Injury Risk (Adult)   Skin Health and Integrity other (see comments)  (I&D scheduled for 1/14/19)

## 2019-01-14 NOTE — ANESTHESIA POSTPROCEDURE EVALUATION
Patient: Millicent Mckeon    Procedure Summary     Date:  01/14/19 Room / Location:  [facility] OR [facility] OR    Anesthesia Start:  1336 Anesthesia Stop:  1429    Procedure:  Right heel incision and drainage with graft application (Right Foot) Diagnosis:       Altered mental status, unspecified altered mental status type      Cellulitis of both lower extremities      (Altered mental status, unspecified altered mental status type [R41.82])      (Cellulitis of both lower extremities [L03.115, L03.116])    Surgeon:  Ar Rueda DPM Provider:  Domingo Norris CRNA    Anesthesia Type:  MAC ASA Status:  3          Anesthesia Type: MAC  Last vitals  BP   154/65 (01/19/19 2324)   Temp   98.2 °F (36.8 °C) (01/19/19 2324)   Pulse   80 (01/19/19 1600)   Resp   16 (01/19/19 2324)     SpO2   97 % (01/19/19 2324)     Post Anesthesia Care and Evaluation    Patient location during evaluation: bedside  Patient participation: complete - patient cannot participate  Level of consciousness: responsive to painful stimuli  Pain score: 0  Pain management: adequate  Airway patency: patent  Anesthetic complications: No anesthetic complications  PONV Status: controlled  Cardiovascular status: acceptable and stable  Respiratory status: acceptable and nasal cannula  Hydration status: acceptable

## 2019-01-14 NOTE — PROGRESS NOTES
[facility]    Name:  Millicent Mckeon   Age:  58 y.o.  Sex:  female  :  1960  MRN:  [id number]   Visit Number:  [id number]  Admission Date:  1/10/2019  Date Of Service:  19  Primary Care Physician:  Rosa Zamora MD     LOS: 4 days :  Patient Care Team:  Rosa Zamora MD as PCP - General (Internal Medicine)  Geremias Shepherd MD as Consulting Physician (General Surgery):    Chief Complaint:      Altered mental status, hypothermia and cellulitis.    Subjective / Interval History:     Ms. Mckeon is currently lying down on the bed and is barely arousable.  She does open eyes on deep pain stimulus.  She was transferred from [facility] emergency room on 1/10/2019 with symptoms of generalized weakness, altered mental status, shortness of breath and leg cellulitis.  She was noted to have hypothermia and hypotension and was placed on warming blanket on presentation.  She was placed on broad-spectrum IV antibiotic therapy with Zosyn and vancomycin and a consultation was obtained from Dr. Rueda from podiatry.  She was also noted to have acute renal failure and has been followed by Dr. Leone.  According to the sister Virginia, patient was in her normal state of health about 2 weeks ago since when she started having generalized weakness and worsening leg swelling.  She has morbid obesity and apparently uses a wheelchair.  She has had another visit to the emergency room recently and was evaluated with CAT scan of the chest and abdomen and was told to have multiple lymph nodes.  She was subsequently recommended to go to [facility] for further evaluation of these lymph nodes.    According to the sister, who is the power of , patient has had progressive decline in her general health associated with generalized weakness in the last one week necessary dating the visit to the emergency room where they found her to have  hypothermia.  Since admission to the ICU he had [facility], patient has not improved with regards to her mental status.  Initial CT of the head was negative for any acute abnormalities.  Patient's body habitus is too large for the MRI scan.  There has been no history of any tonic-clonic convulsions.  Previous physician documentation, laboratory and imaging data have been reviewed.    Review of Systems:     I am unable to obtain complete review of systems due to her altered mental status.    Vital Signs:    Heart Rate:  [71-85] 71  Resp:  [13-20] 14  BP: (129-163)/(67-83) 144/70    Intake and output:    I/O last 3 completed shifts:  In: 1292 [I.V.:1071; Other:120; IV Piggyback:101]  Out: 7200 [Urine:7200]  I/O this shift:  In: -   Out: 1350 [Urine:1350]    Physical Examination:    General Appearance:  Drowsy and opens eyes on deep pain stimulus, not in any acute distress.   Head:  Atraumatic and normocephalic, without obvious abnormality.   Eyes:          PERRLA, conjunctivae and sclerae normal, no Icterus. No pallor. Doll's eye movement present.     Neck: Supple, short neck, trachea midline, no thyromegaly, no carotid bruit.   Lungs:   Chest shape is normal. Breath sounds decreased bilaterally due to thick chest wall.  No wheezing.  Bilateral scattered crackles heard. No Pleural rub or bronchial breathing.   Heart:  Normal S1 and S2, no murmur, no gallop, no rub. No JVD   Abdomen:   Normal bowel sounds, no masses, no organomegaly. Soft, non-tender, obese with a large pannus with healing ulcers noted on the skin.   Extremities: Large lower extremities due to obesity with multiple skin folds that are indurated and hyperemic.  Superficial skin ulcer noted on the lateral aspect of the right leg without any purulent discharge.  Black discoloration of the right heel on the lateral aspect noted.  Wound noted on the medial aspect of the right thigh with purulent slough.  Excoriations noted on bilateral legs and  feet.     Skin: As described above.  Please see nurse's notes and the p the chart for further details.     Neurologic: Drowsy and barely opens eyes on call.     Laboratory results:    Results from last 7 days   Lab Units  01/14/19   0414  01/13/19   0423  01/12/19   1558   01/11/19 0419   SODIUM mmol/L  138  138  137   < >  135*   POTASSIUM mmol/L  4.2  4.8  5.3*   < >  5.7*   CHLORIDE mmol/L  109*  108*  108*   < >  106   CO2 mmol/L  19.0*  18.0*  19.0*   < >  19.0*   BUN mg/dL  82*  73*  64*   < >  50*   CREATININE mg/dL  4.00*  3.80*  3.80*   < >  3.50*   CALCIUM mg/dL  7.9*  8.1*  8.0*   < >  8.0*   BILIRUBIN mg/dL  0.4  0.4   --    --   0.4   ALK PHOS U/L  272*  323*   --    --   305*   ALT (SGPT) U/L  27  34   --    --   37   AST (SGOT) U/L  16  21   --    --   28   GLUCOSE mg/dL  181*  202*  216*   < >  139*    < > = values in this interval not displayed.     Results from last 7 days   Lab Units  01/14/19 0414 01/13/19 0423 01/12/19 0431   WBC 10*3/mm3  18.96*  22.61*  20.07*   HEMOGLOBIN g/dL  8.2*  8.6*  8.2*   HEMATOCRIT %  26.5*  27.1*  25.8*   PLATELETS 10*3/mm3  102*  131  133     Results from last 7 days   Lab Units  01/13/19   0901  01/10/19   2112   INR   1.41*  1.57*     Results from last 7 days   Lab Units  01/12/19   0431  01/10/19   2110   CK TOTAL U/L  40  35   TROPONIN I ng/mL   --   0.018     Results from last 7 days   Lab Units  01/11/19   0744  01/11/19   0735  01/10/19   2318  01/10/19   2313   BLOODCX   No growth at 3 days  No growth at 3 days   --    --    URINECX    --    --    --   Mixed Chrissie Isolated   MRSA SCREEN CX    --    --   No Methicillin Resistant Staphylococcus aureus isolated   --      I have reviewed the patient's laboratory results.    Radiology results:    Imaging Results (last 24 hours)     Procedure Component Value Units Date/Time    XR Chest 1 View [[id number]] Collected:  01/14/19 1150     Updated:  01/14/19 1208    Narrative:       PORTABLE CHEST      INDICATION: Central line placement.     FINDINGS: Single frontal portable chest. There is a right-sided central  venous catheter which terminates near the proximal aspect of the  superior vena cava. Nasogastric tube extends below the diaphragm with  the tip out of the field-of-view but likely in the midstomach. No  pneumothorax. Cardiac silhouette appears mildly prominent. There are  perihilar infiltrates. No definite effusion.       Impression:       1. Findings are suggestive of mild edema.  2. Tubes and lines positioned as described.     This report was finalized on 1/14/2019 12:06 PM by Jean Marie Schmitt MD.        I have reviewed the patient's radiology reports.    Medication Review:     I have reviewed the patients active and prn medications.       Cellulitis of both lower extremities    Altered mental status    Anemia    Bilateral edema of lower extremity    Cellulitis of lower extremity    Acute on chronic renal failure (CMS/[facility])    GERD (gastroesophageal reflux disease)    Hyperkalemia    Hypertension    Hypothermia    Hypothyroidism    Type 2 diabetes mellitus with complication (CMS/[facility])    Vitamin B12 deficiency    Assessment:    1.  Sepsis with hypothermia, present on admission.  2.  Bilateral lower extremity cellulitis, present on admission.  3.  Acute metabolic encephalopathy, present on admission.  4.  Acute renal failure, present on admission.  5.  Hyperkalemia, present on admission.  6.  Acquired hypothyroidism, uncontrolled.  7.  Diabetes mellitus type 2 with nephropathy.  8.  Morbid obesity with a BMI of 63.  9.  Chronic venous insufficiency of the lower extremities.  10. Retrocrural, paratracheal, retro-peritoneal, bilateral inguinal lymphadenopathy, uncertain etiology.  11.  Left thyroid nodule 2 cm on recent CT scan.    Plan:    Ms. Mckeon unfortunately is still very drowsy due to encephalopathy.  The exact etiology of this is uncertain but is likely multifactorial including sepsis, hypothermia,  uremia.  I do not suspect cerebrovascular accident at this time.  She will be continued on Zosyn and vancomycin antibiotic therapy.  We will continue warming blanket to keep her temperature around 98°.  She is being followed by Dr. Leone for her renal failure and is currently on Bumex drip.  He thinks that the patient may need initiation of hemodialysis.  Her chest x-ray does show volume overload which is likely due to the renal failure.  A 2-D echocardiogram has been done and the results are currently pending.    I discussed the patient's condition with Dr. Trujillo and he did place a right subclavian dialysis catheter today.  I discussed the patient's condition with her sister Virginia over the phone.  The patient lives with her sister and the sister is her power of .  Apparently, the patient does not have any children.  Her sister Virginia consented for the procedures and dialysis if needed.  She also consented for the surgical procedure if necessary.  I discussed advanced directives with her and she stated that the patient is full code.    The exact etiology of her continued altered mental status is uncertain but could be related to the sepsis and uremia.  Her ammonia level is within normal limits.  We will get an EEG of the brain to see if she has any slowing or any evidence of seizures.  She does have uncontrolled hypothyroidism, which may be contributing to her mental status.  She will be continued on he will thyroxine which I will increase the dose 200 mg daily.  She is already on IV hydrocortisone.    Patient's recent CT scan of the abdomen and pelvis done at [facility] emergency room noted several lymphadenopathy especially in the retroperitoneal and bilateral inguinal regions as well as paratracheal and retrocrural. Our initial CT scan reports done here did not mention these findings but I discussed with our radiologist today and he reviewed the scans done here and he stated that he  does see the lymph nodes in the above-mentioned areas.  He also stated that the patient has a tiny nodule on the left lobe of the thyroid but he did not think it was 2 cm in size.    Patient is being followed by Dr. Rueda from podiatry and he is planning to do surgery on her right foot.  Unfortunately, her prognosis is extremely poor due to multiple comorbidities, morbid obesity as well as the presence of lymphadenopathy the etiology of which is uncertain at this time.  I have discussed the patient's condition with Dr. Leone.  She is on heparin for DVT prophylaxis.    Samson Reyes MD  01/14/19  3:19 PM    Dictated utilizing Dragon dictation.

## 2019-01-14 NOTE — SIGNIFICANT NOTE
01/14/19 0948   Rehab Time/Intention   Evaluation Not Performed patient unavailable for evaluation  (Patient is not appropriate for PT at this time.  PT to attempt again tomorrow)   Rehab Treatment   Discipline physical therapist

## 2019-01-15 LAB
ANION GAP SERPL CALCULATED.3IONS-SCNC: 18.2 MMOL/L (ref 10–20)
ANISOCYTOSIS BLD QL: ABNORMAL
BUN BLD-MCNC: 98 MG/DL (ref 7–20)
BUN/CREAT SERPL: 26.5 (ref 7.1–23.5)
CALCIUM SPEC-SCNC: 7.7 MG/DL (ref 8.4–10.2)
CHLORIDE SERPL-SCNC: 109 MMOL/L (ref 98–107)
CO2 SERPL-SCNC: 19 MMOL/L (ref 26–30)
CREAT BLD-MCNC: 3.7 MG/DL (ref 0.6–1.3)
DEPRECATED RDW RBC AUTO: 60.4 FL (ref 37–54)
ERYTHROCYTE [DISTWIDTH] IN BLOOD BY AUTOMATED COUNT: 20.7 % (ref 11.5–14.5)
GFR SERPL CREATININE-BSD FRML MDRD: 12 ML/MIN/1.73
GFR SERPL CREATININE-BSD FRML MDRD: 15 ML/MIN/1.73
GLUCOSE BLD-MCNC: 220 MG/DL (ref 74–98)
GLUCOSE BLDC GLUCOMTR-MCNC: 205 MG/DL (ref 70–130)
GLUCOSE BLDC GLUCOMTR-MCNC: 213 MG/DL (ref 70–130)
GLUCOSE BLDC GLUCOMTR-MCNC: 238 MG/DL (ref 70–130)
GLUCOSE BLDC GLUCOMTR-MCNC: 244 MG/DL (ref 70–130)
HBA1C MFR BLD: 6.7 % (ref 3–6)
HCT VFR BLD AUTO: 26.2 % (ref 37–47)
HGB BLD-MCNC: 8.1 G/DL (ref 12–16)
HYPOCHROMIA BLD QL: ABNORMAL
LYMPHOCYTES # BLD MANUAL: 1.59 10*3/MM3 (ref 0.6–3.4)
LYMPHOCYTES NFR BLD MANUAL: 4 % (ref 0–12)
LYMPHOCYTES NFR BLD MANUAL: 8 % (ref 10–50)
MCH RBC QN AUTO: 25.2 PG (ref 27–31)
MCHC RBC AUTO-ENTMCNC: 30.9 G/DL (ref 30–37)
MCV RBC AUTO: 81.6 FL (ref 81–99)
METAMYELOCYTES NFR BLD MANUAL: 3 % (ref 0–0)
MONOCYTES # BLD AUTO: 0.79 10*3/MM3 (ref 0–0.9)
NEUTROPHILS # BLD AUTO: 16.85 10*3/MM3 (ref 2–6.9)
NEUTROPHILS NFR BLD MANUAL: 78 % (ref 37–80)
NEUTS BAND NFR BLD MANUAL: 7 % (ref 0–6)
PLATELET # BLD AUTO: 83 10*3/MM3 (ref 130–400)
PMV BLD AUTO: ABNORMAL FL (ref 6–12)
POIKILOCYTOSIS BLD QL SMEAR: ABNORMAL
POTASSIUM BLD-SCNC: 4.2 MMOL/L (ref 3.5–5.1)
RBC # BLD AUTO: 3.21 10*6/MM3 (ref 4.2–5.4)
SCAN SLIDE: NORMAL
SMALL PLATELETS BLD QL SMEAR: ABNORMAL
SODIUM BLD-SCNC: 142 MMOL/L (ref 137–145)
WBC MORPH BLD: NORMAL
WBC NRBC COR # BLD: 19.82 10*3/MM3 (ref 4.8–10.8)

## 2019-01-15 PROCEDURE — 63710000001 INSULIN DETEMIR PER 5 UNITS: Performed by: INTERNAL MEDICINE

## 2019-01-15 PROCEDURE — 86706 HEP B SURFACE ANTIBODY: CPT | Performed by: INTERNAL MEDICINE

## 2019-01-15 PROCEDURE — 86704 HEP B CORE ANTIBODY TOTAL: CPT | Performed by: INTERNAL MEDICINE

## 2019-01-15 PROCEDURE — 80048 BASIC METABOLIC PNL TOTAL CA: CPT | Performed by: INTERNAL MEDICINE

## 2019-01-15 PROCEDURE — 85025 COMPLETE CBC W/AUTO DIFF WBC: CPT | Performed by: INTERNAL MEDICINE

## 2019-01-15 PROCEDURE — 86707 HEPATITIS BE ANTIBODY: CPT | Performed by: INTERNAL MEDICINE

## 2019-01-15 PROCEDURE — 99233 SBSQ HOSP IP/OBS HIGH 50: CPT | Performed by: INTERNAL MEDICINE

## 2019-01-15 PROCEDURE — 87350 HEPATITIS BE AG IA: CPT | Performed by: INTERNAL MEDICINE

## 2019-01-15 PROCEDURE — 25010000002 PIPERACILLIN SOD-TAZOBACTAM PER 1 G: Performed by: INTERNAL MEDICINE

## 2019-01-15 PROCEDURE — 25010000002 HYDRALAZINE PER 20 MG: Performed by: HOSPITALIST

## 2019-01-15 PROCEDURE — 25010000002 HEPARIN (PORCINE) PER 1000 UNITS: Performed by: HOSPITALIST

## 2019-01-15 PROCEDURE — 82962 GLUCOSE BLOOD TEST: CPT

## 2019-01-15 PROCEDURE — 90935 HEMODIALYSIS ONE EVALUATION: CPT

## 2019-01-15 PROCEDURE — 5A1D70Z PERFORMANCE OF URINARY FILTRATION, INTERMITTENT, LESS THAN 6 HOURS PER DAY: ICD-10-PCS | Performed by: INTERNAL MEDICINE

## 2019-01-15 PROCEDURE — 25010000002 HYDROCORTISONE SODIUM SUCCINATE 100 MG RECONSTITUTED SOLUTION: Performed by: HOSPITALIST

## 2019-01-15 PROCEDURE — 63710000001 INSULIN REGULAR HUMAN PER 5 UNITS: Performed by: HOSPITALIST

## 2019-01-15 PROCEDURE — 85007 BL SMEAR W/DIFF WBC COUNT: CPT | Performed by: INTERNAL MEDICINE

## 2019-01-15 PROCEDURE — 83036 HEMOGLOBIN GLYCOSYLATED A1C: CPT | Performed by: INTERNAL MEDICINE

## 2019-01-15 PROCEDURE — 80074 ACUTE HEPATITIS PANEL: CPT | Performed by: INTERNAL MEDICINE

## 2019-01-15 PROCEDURE — 25010000002 HEPARIN (PORCINE) PER 1000 UNITS: Performed by: INTERNAL MEDICINE

## 2019-01-15 PROCEDURE — 99232 SBSQ HOSP IP/OBS MODERATE 35: CPT | Performed by: PODIATRIST

## 2019-01-15 RX ORDER — HEPARIN SODIUM 1000 [USP'U]/ML
2400 INJECTION, SOLUTION INTRAVENOUS; SUBCUTANEOUS AS NEEDED
Status: DISCONTINUED | OUTPATIENT
Start: 2019-01-15 | End: 2019-01-22 | Stop reason: HOSPADM

## 2019-01-15 RX ORDER — LORAZEPAM 2 MG/ML
0.5 INJECTION INTRAMUSCULAR EVERY 6 HOURS PRN
Status: DISCONTINUED | OUTPATIENT
Start: 2019-01-15 | End: 2019-01-22 | Stop reason: HOSPADM

## 2019-01-15 RX ORDER — WHITE PETROLATUM 450 MG/G
1 STICK TOPICAL
Status: DISCONTINUED | OUTPATIENT
Start: 2019-01-15 | End: 2019-01-22 | Stop reason: HOSPADM

## 2019-01-15 RX ORDER — HYDRALAZINE HYDROCHLORIDE 25 MG/1
25 TABLET, FILM COATED ORAL EVERY 8 HOURS SCHEDULED
Status: DISCONTINUED | OUTPATIENT
Start: 2019-01-15 | End: 2019-01-22 | Stop reason: HOSPADM

## 2019-01-15 RX ADMIN — HYDROCORTISONE SODIUM SUCCINATE 50 MG: 100 INJECTION, POWDER, FOR SOLUTION INTRAMUSCULAR; INTRAVENOUS at 00:38

## 2019-01-15 RX ADMIN — TAZOBACTAM SODIUM AND PIPERACILLIN SODIUM 3.38 G: 375; 3 INJECTION, SOLUTION INTRAVENOUS at 20:07

## 2019-01-15 RX ADMIN — INSULIN DETEMIR 20 UNITS: 100 INJECTION, SOLUTION SUBCUTANEOUS at 08:51

## 2019-01-15 RX ADMIN — SODIUM CHLORIDE, PRESERVATIVE FREE 3 ML: 5 INJECTION INTRAVENOUS at 21:11

## 2019-01-15 RX ADMIN — LEVOTHYROXINE SODIUM 100 MCG: 100 TABLET ORAL at 05:29

## 2019-01-15 RX ADMIN — SODIUM CHLORIDE 10 ML/HR: 9 INJECTION, SOLUTION INTRAVENOUS at 08:49

## 2019-01-15 RX ADMIN — HUMAN INSULIN 3 UNITS: 100 INJECTION, SOLUTION SUBCUTANEOUS at 00:38

## 2019-01-15 RX ADMIN — HUMAN INSULIN 3 UNITS: 100 INJECTION, SOLUTION SUBCUTANEOUS at 11:40

## 2019-01-15 RX ADMIN — HEPARIN SODIUM 2400 UNITS: 1000 INJECTION, SOLUTION INTRAVENOUS; SUBCUTANEOUS at 15:02

## 2019-01-15 RX ADMIN — HYDRALAZINE HYDROCHLORIDE 25 MG: 25 TABLET ORAL at 21:11

## 2019-01-15 RX ADMIN — HEPARIN SODIUM 5000 UNITS: 5000 INJECTION, SOLUTION INTRAVENOUS; SUBCUTANEOUS at 21:11

## 2019-01-15 RX ADMIN — BUMETANIDE 1 MG/HR: 0.25 INJECTION INTRAMUSCULAR; INTRAVENOUS at 03:58

## 2019-01-15 RX ADMIN — SODIUM CHLORIDE, PRESERVATIVE FREE 3 ML: 5 INJECTION INTRAVENOUS at 09:05

## 2019-01-15 RX ADMIN — HYDRALAZINE HYDROCHLORIDE 10 MG: 20 INJECTION INTRAMUSCULAR; INTRAVENOUS at 05:29

## 2019-01-15 RX ADMIN — HYDROCORTISONE SODIUM SUCCINATE 50 MG: 100 INJECTION, POWDER, FOR SOLUTION INTRAMUSCULAR; INTRAVENOUS at 13:56

## 2019-01-15 RX ADMIN — Medication 1 CAPSULE: at 08:50

## 2019-01-15 RX ADMIN — HUMAN INSULIN 3 UNITS: 100 INJECTION, SOLUTION SUBCUTANEOUS at 05:29

## 2019-01-15 RX ADMIN — Medication 2 SPRAY: at 05:29

## 2019-01-15 RX ADMIN — HEPARIN SODIUM 5000 UNITS: 5000 INJECTION, SOLUTION INTRAVENOUS; SUBCUTANEOUS at 13:56

## 2019-01-15 RX ADMIN — FAMOTIDINE 20 MG: 10 INJECTION, SOLUTION INTRAVENOUS at 08:50

## 2019-01-15 RX ADMIN — HEPARIN SODIUM 5000 UNITS: 5000 INJECTION, SOLUTION INTRAVENOUS; SUBCUTANEOUS at 05:29

## 2019-01-15 RX ADMIN — Medication 2 SPRAY: at 14:03

## 2019-01-15 RX ADMIN — WHITE PETROLATUM 1 EACH: 450 STICK TOPICAL at 18:44

## 2019-01-15 RX ADMIN — HYDRALAZINE HYDROCHLORIDE 25 MG: 25 TABLET ORAL at 13:56

## 2019-01-15 RX ADMIN — WHITE PETROLATUM 1 EACH: 450 STICK TOPICAL at 14:03

## 2019-01-15 RX ADMIN — Medication 2 SPRAY: at 18:44

## 2019-01-15 RX ADMIN — WHITE PETROLATUM 1 EACH: 450 STICK TOPICAL at 11:40

## 2019-01-15 RX ADMIN — HUMAN INSULIN 3 UNITS: 100 INJECTION, SOLUTION SUBCUTANEOUS at 18:43

## 2019-01-15 RX ADMIN — Medication 2 SPRAY: at 11:39

## 2019-01-15 RX ADMIN — TAZOBACTAM SODIUM AND PIPERACILLIN SODIUM 3.38 G: 375; 3 INJECTION, SOLUTION INTRAVENOUS at 09:03

## 2019-01-15 RX ADMIN — Medication 2 SPRAY: at 08:50

## 2019-01-15 NOTE — SIGNIFICANT NOTE
01/15/19 0934   Rehab Time/Intention   Evaluation Not Performed patient unavailable for evaluation  (Hold per MD due to increased lethargy, will have dialysis later today; will follow up tomorrow)   Rehab Treatment   Discipline occupational therapist

## 2019-01-15 NOTE — PLAN OF CARE
Problem: Fall Risk (Adult)  Intervention: Monitor/Assist with Self Care   19   Activity   Activity Assistance Provided assistance, 2 people     Intervention: Reduce Risk/Promote Restraint Free Environment   19 1800 19 1900   Safety Management   Environmental Safety Modification --  assistive device/personal items within reach;clutter free environment maintained;room organization consistent   Safety Promotion/Fall Prevention --  safety round/check completed   Prevent  Drop/Fall   Safety/Security Measures bed alarm set --      Intervention: Review Medications/Identify Contributors to Fall Risk   19   Safety Management   Medication Review/Management medications reviewed;high risk medications identified;provider consulted;infusion initiated       Goal: Identify Related Risk Factors and Signs and Symptoms  Outcome: Ongoing (interventions implemented as appropriate)   19   Fall Risk (Adult)   Related Risk Factors (Fall Risk) inadequate lighting;gait/mobility problems;fatigue/slow reaction;confusion/agitation;bladder function altered;polypharmacy;sensory deficits;sleep pattern alteration;slippery/uneven surfaces;environment unfamiliar   Signs and Symptoms (Fall Risk) presence of risk factors     Goal: Absence of Fall  Outcome: Ongoing (interventions implemented as appropriate)   19   Fall Risk (Adult)   Absence of Fall making progress toward outcome       Problem: Skin Injury Risk (Adult)  Intervention: Promote/Optimize Nutrition   19 0802   Nutrition Interventions   Oral Nutrition Promotion nutritional therapy counseling provided;rest periods promoted;safe use of adaptive equipment encouraged     Intervention: Prevent/Manage Excess Moisture   19 0802 19 1500   Skin Interventions   Skin Protection adhesive use limited;drying agents applied;incontinence pads utilized;protective footwear used;pulse oximeter probe site changed;sacral silicone  foam dressing in place;skin sealant/moisture barrier applied;skin-to-device areas padded;skin-to-skin areas padded;transparent dressing maintained;tubing/devices free from skin contact --    Hygiene Care   Perineal Care --  absorbent pad changed;catheter care provided;perineum cleansed;protective cream applied   Bathing/Skin Care --  back care;bath, complete;dressed/undressed;incontinence care;linen changed     Intervention: Maintain Head of Bed Elevation Less Than 30 Degrees as Tolerated   01/14/19 2003   Positioning   Head of Bed (HOB) HOB at 30 degrees     Intervention: Prevent/Minimize Shear/Friction Injuries   01/14/19 0802 01/14/19 1900   Positioning   Positioning/Transfer Devices --  pillows;in use   Skin Interventions   Pressure Reduction Devices foam padding utilized;heel offloading device utilized;positioning supports utilized;pressure-redistributing mattress utilized;specialty bed utilized --      Intervention: Prevent or Minimize Pressure   01/14/19 0802 01/14/19 1900   Positioning   Body Position --  supine, legs elevated;upper extremity elevated, left;upper extremity elevated, right;lower extremity elevated, left;lower extremity elevated, right   Skin Interventions   Pressure Reduction Techniques frequent weight shift encouraged;heels elevated off bed;positioned off wounds;pressure points protected;rest period provided between sit times;sit time limited to 1 hour;weight shift assistance provided --        Goal: Identify Related Risk Factors and Signs and Symptoms  Outcome: Ongoing (interventions implemented as appropriate)   01/14/19 2003   Skin Injury Risk (Adult)   Related Risk Factors (Skin Injury Risk) body weight extremes;cognitive impairment;critical care admission;edema;fluid intake inadequate;hospitalization prolonged;hypothermia/hyperthermia;infection;mechanical forces;medical devices;medication;mobility impaired;moisture;nutritional deficiencies;tissue perfusion altered     Goal: Skin Health  and Integrity  Outcome: Ongoing (interventions implemented as appropriate)   01/14/19 2003   Skin Injury Risk (Adult)   Skin Health and Integrity unable to achieve outcome       Problem: Nutrition, Enteral (Adult)  Intervention: Manage Aspiration Risk   01/14/19 1202   Safety Interventions   Aspiration Precautions awake/alert before oral intake;stimuli minimized while eating;NPO pending swallow screening/evaluation;tube feeding placement verified;upright posture maintained     Intervention: Position with HOB Elevated   01/14/19 1900 01/14/19 2003   Positioning   Head of Bed (HOB) --  HOB at 30 degrees   Body Position supine, legs elevated;upper extremity elevated, left;upper extremity elevated, right;lower extremity elevated, left;lower extremity elevated, right --      Intervention: Monitor/Manage Gastrointestinal Function/Elimination   01/14/19 2003   Monitor/Manage Chemotherapy Gastrointestinal Effects   Bowel Dysfunction Management relaxation techniques promoted;sitting position facilitated   Monitor/Manage Hypovolemia   Nausea/Vomiting Interventions slow deep breathing encouraged   Gastrointestinal (GI) Interventions   Diarrhea Management tube feeding formula adjusted     Intervention: Monitor/Manage Fluid Electrolyte Balance   01/14/19 0802   Nutrition Interventions   Fluid/Electrolyte Management intravenous fluids adjusted     Intervention: Monitor/Manage Nutrition Support   01/14/19 0802   Nutrition Interventions   Nutrition Support Management tube feeding held;weight trending reviewed     Intervention: Prevent Feeding-Related Adverse Events   01/14/19 1800   Safety Management   Medication Review/Management medications reviewed   Hygiene Care   Oral Care oral rinse provided;swabbed with antiseptic solution     Intervention: Promote Cindy-Tube Skin/Mucosal Integrity   01/14/19 0802   Skin Interventions   Device Skin Pressure Protection absorbent pad utilized/changed;adhesive use limited;positioning supports  utilized;pressure points protected;skin-to-device areas padded;skin-to-skin areas padded       Goal: Signs and Symptoms of Listed Potential Problems Will be Absent, Minimized or Managed (Nutrition, Enteral)  Outcome: Ongoing (interventions implemented as appropriate)   01/14/19 2003   Goal/Outcome Evaluation   Problems Assessed (Enteral Nutrition) all   Problems Present (Enteral Nutrition) mechanical complications;skin/mucosal integrity impairment;malnutrition;fluid/electrolyte imbalance

## 2019-01-15 NOTE — PROGRESS NOTES
Patient Care Team:  Rosa Zamora MD as PCP - General (Internal Medicine)  Geremias Shepherd MD as Consulting Physician (General Surgery)    Chief complaint right heel.    Subjective .   58-year-old morbidly obese diabetic female with multiple comorbidities, seen at bedside in the ICU today receiving dialysis.  She is 1 day status post debridement and grafting of the right heel wound.  Dressing is intact but she has had some bleeding in which the dressing has had to be reinforced multiple times.  She was noted bleed postoperatively and I had anticipated bleeding given her blood thinners and a comorbidities but this seems to have coagulated and slowed.  She somewhat more verbal today but still noncommunicative and not making sense.  She does somewhat yell out random words on occasion.      Review of Systems  All systems were reviewed and negative except for chief complaint.    History  Past Medical History:   Diagnosis Date   • Diabetes mellitus (CMS/HCC)    • Disease of thyroid gland    • GERD (gastroesophageal reflux disease)    • Hypertension    , Past Surgical History:   Procedure Laterality Date   • EYE SURGERY     • INCISION AND DRAINAGE LEG Right 1/14/2019    Procedure: Right heel incision and drainage with graft application;  Surgeon: Ar Rueda DPM;  Location: [facility];  Service: Podiatry   • LEG SURGERY     , Family History   Problem Relation Age of Onset   • Asthma Mother    • Hypertension Father    • Stroke Father    , Social History     Tobacco Use   • Smoking status: Never Smoker   • Smokeless tobacco: Never Used   Substance Use Topics   • Alcohol use: No     Frequency: Never   • Drug use: No   , Medications Prior to Admission   Medication Sig Dispense Refill Last Dose   • ferrous sulfate 324 (65 Fe) MG tablet delayed-release EC tablet Take 324 mg by mouth 2 (Two) Times a Day.      • gabapentin (NEURONTIN) 600 MG tablet Take 600 mg by mouth 3 (Three) Times a Day.      • glipiZIDE (GLUCOTROL)  10 MG tablet Take 20 mg by mouth 2 (Two) Times a Day.      • Insulin Glargine (BASAGLAR KWIKPEN) 100 UNIT/ML injection pen Inject 30 Units under the skin into the appropriate area as directed Every Night.      • meclizine 25 MG chewable tablet chewable tablet Chew 25 mg 3 (Three) Times a Day As Needed (for dizziness or motion sickness).      • allopurinol (ZYLOPRIM) 300 MG tablet Take 300 mg by mouth Daily.      • amLODIPine (NORVASC) 5 MG tablet Take 5 mg by mouth Daily.  0    • esomeprazole (nexIUM) 40 MG capsule Take 40 mg by mouth Every Morning Before Breakfast.  0    • furosemide (LASIX) 20 MG tablet Take 20 mg by mouth 2 (Two) Times a Day.      • RA ASPIRIN EC 81 MG EC tablet Take 81 mg by mouth Daily.  0    • RA VITAMIN C 500 MG tablet Take 500 mg by mouth Daily.  0    • RA VITAMIN D-3 2000 units capsule Take 2,000 Units by mouth Daily.  0    • simvastatin (ZOCOR) 20 MG tablet Take 20 mg by mouth Every Night.  0    • TRADJENTA 5 MG tablet tablet Take 5 mg by mouth Daily.      , Scheduled Meds:    famotidine 20 mg Intravenous Daily   heparin (porcine) 5,000 Units Subcutaneous Q8H   hydrALAZINE 25 mg Nasogastric Q8H   hydrocortisone sodium succinate 50 mg Intravenous Q12H   insulin detemir 20 Units Subcutaneous QAM   insulin regular 0-7 Units Subcutaneous Q6H   lactobacillus acidophilus 1 capsule Nasogastric Daily   levothyroxine 100 mcg Nasogastric Q AM   piperacillin-tazobactam 3.375 g Intravenous Once   piperacillin-tazobactam 3.375 g Intravenous Q12H   sodium chloride 3 mL Intravenous Q12H   , Continuous Infusions:    Pharmacy Consult     Sodium chloride 10 mL/hr Last Rate: 10 mL/hr (01/15/19 0902)   , PRN Meds:  •  acetaminophen  •  CHAPSTICK  •  dextrose  •  dextrose  •  glucagon (human recombinant)  •  glycerin  •  heparin (porcine)  •  hydrALAZINE  •  Morphine **AND** naloxone  •  ondansetron **OR** ondansetron ODT **OR** ondansetron  •  Pharmacy Consult  •  sodium chloride  •  sodium chloride  •  zinc  "oxide and Allergies:  Metformin and related    Objective     Vital Signs   /68 (BP Location: Right arm, Patient Position: Lying)   Pulse 89   Temp 95 °F (35 °C) (Oral)   Resp 16   Ht 165.1 cm (65\")   Wt (!) 171 kg (375 lb 14.4 oz)   SpO2 96%   BMI 62.55 kg/m²     Physical Exam:  Review the dressing and there is maceration to the arch but the wound itself is healthy and coagulated.  Graft is intact to the wound bed underneath the packing.  The wound bed seems to be granular and has a hematoma and clot formation.  No significant change to size.  No drainage or odor.  The perimeter of the wound aside from some maceration seems to be healthy.       Results Review: Laboratory data reviewed.  Cultures are growing multiple organisms.  Gram-positive cocci in pairs and change in clusters are noted.  Cultures from 1/10/19 are positive for Proteus.  Also positive for gram-negative bacilli  Blood cultures are negative at 4 days.      Assessment/Plan   58-year-old morbidly obese diabetic female with multiple comorbidities, seen at bedside in the ICU today receiving dialysis.  She is 1 day status post debridement and grafting of the right heel wound.     Cellulitis of both lower extremities    Anemia    Bilateral edema of lower extremity    Cellulitis of lower extremity    Acute on chronic renal failure (CMS/HCC)    GERD (gastroesophageal reflux disease)    Hyperkalemia    Essential hypertension    Hypothermia    Hypothyroidism    Type 2 diabetes mellitus with complication (CMS/HCC)    Vitamin B12 deficiency    Acute metabolic encephalopathy  Changed her dressing today to allow the periwound to dry.  The wound should be amenable to wound VAC so that will be ordered and we will begin wound VAC changes to the right heel.  Continue medical management and antibiotics per primary team.  She's finally receiving hemodialysis which may help as well.  We'll continue to follow.        Ar Rueda DPM  01/15/19  12:53 " PM

## 2019-01-15 NOTE — PLAN OF CARE
Problem: Patient Care Overview  Goal: Plan of Care Review  Outcome: Ongoing (interventions implemented as appropriate)   01/15/19 0236   Coping/Psychosocial   Plan of Care Reviewed With patient   Plan of Care Review   Progress no change       Problem: Fall Risk (Adult)  Goal: Absence of Fall  Outcome: Ongoing (interventions implemented as appropriate)      Problem: Skin Injury Risk (Adult)  Goal: Skin Health and Integrity  Outcome: Ongoing (interventions implemented as appropriate)      Problem: Nutrition, Enteral (Adult)  Goal: Signs and Symptoms of Listed Potential Problems Will be Absent, Minimized or Managed (Nutrition, Enteral)  Outcome: Ongoing (interventions implemented as appropriate)

## 2019-01-15 NOTE — SIGNIFICANT NOTE
01/15/19 1523   Rehab Time/Intention   Evaluation Not Performed patient unavailable for evaluation  (Hold per MD.  PT will attempt again tomorrow.)   Rehab Treatment   Discipline physical therapist

## 2019-01-15 NOTE — PROGRESS NOTES
Adult Nutrition  Assessment/PES    Patient Name:  Millicent Mckeon  YOB: 1960  MRN: [id number]  Admit Date:  1/10/2019    Assessment Date:  1/15/2019    Comments: Rec #1: Continue current NPO diet order r/t AMS. Rec #2: Consider renal MVI with minerals daily as pt is to start HD today. Rec #3: Continue current tube feeding regimen of Novasource Renal at 31 mL/hr goal rate. Tube feeding will provide: ~1364 Kcal, 61.86 gm Protein and 489 ml tube feeding water. Rec. #4: Free water flushes 146 ml Q 4 hours. Rec. #5: ProStat 30 ml BID via feeding tube. ProStat to provide an additional 200 Kcal and 30 gm Protein. RD to follow pt and adjust tube feeding based on tolerance and residuals. Consult RD PRN.       Reason for Assessment     Row Name 01/15/19 1120          Reason for Assessment    Reason For Assessment  follow-up protocol;TF/PN     Diagnosis  cardiac disease;diabetes diagnosis/complications;fluid status;other (see comments);metabolic state Cellulitis, DM2, AMS, Bilateral Edema, Morbidly Obese, CKD, Sepsis, Metabolic encephalopathy, hypothyroidism, right heel ulcer, microcytic anemia     Identified At Risk by Screening Criteria  BMI;large or nonhealing wound, burn or pressure injury;MST SCORE 2+             Labs/Tests/Procedures/Meds     Row Name 01/15/19 1121          Labs/Procedures/Meds    Lab Results Reviewed  reviewed, pertinent     Lab Results Comments  High: Cl, Glu, BUN, Creat   Low: Platelets, Alb, Amonia        Medications    Pertinent Medications Reviewed  reviewed         Physical Findings     Row Name 01/15/19 1121          Physical Findings    Overall Physical Appearance  overweight     Tubes  nasogastric tube     Skin  non-healing wound(s)           Nutrition Prescription Ordered     Row Name 01/15/19 1121          Nutrition Prescription PO    Current PO Diet  NPO        Nutrition Prescription EN    Enteral Route  NG     Product  Novasource Renal ([place]ro)     Modulars  Liquid Protein  (15 gm/30 mL)     Liquid Protein (15 gm/30 mL)  30 mL/1 packet     Protein Liquid Frequency  2 times a day     TF Delivery Method  Continuous     Continuous TF Goal Rate (mL/hr)  31 mL/hr     Continuous TF Current Rate (mL/hr)  31 mL/hr     Continuous TF Goal Volume (mL)  682 mL     Continuous TF Current Volume (mL)  682 mL     Water flush (mL)   146 mL     Water Flush Frequency  Every 4 hours         Evaluation of Received Nutrient/Fluid Intake     Row Name 01/15/19 1123          PO Evaluation    Number of Days PO Intake Evaluated  Insufficient Data NPOx5 days        EN Evaluation    Number of Days EN Intake Evaluated  2 days     EN Average Volume Delivered (mL/day)  463 mL/day     % Goal Volume   68 %     HOB  Greater than or equal to 30 degress               Problem/Interventions:  Problem 1     Row Name 01/15/19 1125          Nutrition Diagnoses Problem 1    Problem 1  Overweight/Obesity     Etiology (related to)  Factors Affecting Nutrition     Food Habit/Preferences  Large Meals     Signs/Symptoms (evidenced by)  BMI     BMI  Greater than 40         Problem 2     Row Name 01/15/19 1126          Nutrition Diagnoses Problem 2    Problem 2  Impaired Nutrient Utilization     Etiology (related to)  Medical Diagnosis     Endocrine  DM Type 2     Renal  CKD     Signs/Symptoms (evidenced by)  Biochemical     Specific Labs Noted  Glucose;BUN;Creatinine;Mg++;Phosphorus;Chloride;Platelets         Problem 3     Row Name 01/15/19 1126          Nutrition Diagnoses Problem 3    Problem 3  Inadequate Intake/Infusion     Inadequate Intake Type  Oral     Macronutrient  Kcal;Carbohydrate;Fluid;Protein     Micronutrient  Vitamin;Mineral     Etiology (related to)  MNT for Treatment/Condition     Signs/Symptoms (evidenced by)  NPO         Problem 4     Row Name 01/15/19 1126          Nutrition Diagnoses Problem 4    Problem 4  Increased Nutrient Needs     Etiology (related to)  Medical Diagnosis     Infectious Disease  Sepsis      Skin  Non healing wound     Signs/Symptoms (evidenced by)  Other (comment);Report/Observation wound noted, sepsis diagnosis           Intervention Goal     Row Name 01/15/19 1127          Intervention Goal    General  Meet nutritional needs for age/condition     PO  Meet estimated needs     TF/PN  Maintain TF/PN;Tolerate TF at goal;Deliver estimated need (%)     Deliver % of Estimated Need  100 %     Transition  TF to PO     Weight  No significant weight loss         Nutrition Intervention     Row Name 01/15/19 1127          Nutrition Intervention    RD/Tech Action  Follow Tx progress;Care plan reviewd;Recommend/ordered     Recommended/Ordered  EN         Nutrition Prescription     Row Name 01/15/19 1128          Nutrition Prescription PO    PO Prescription  Other (comment) Continue NPO r/t AMS        Nutrition Prescription EN    Enteral Prescription  Continue same protocol     Enteral Route  NG     Product  Novasource Renal     Modulars  Liquid Protein (15 gm/30 mL)     Liquid Protein (15 gm/30 mL)  30 mL/1 packet     Protein Liquid Frequency  2 times a day     TF Delivery Method  Continuous     Continuous TF Goal Rate (mL/hr)  31 mL/hr     Continuous TF Starting Rate (mL/hr)  25 mL/hr     Continuous TF Goal Volume (mL)  682 mL     Continuous TF Starting Volume (mL)  550 mL     Water flush (mL)   146 mL     Water Flush Frequency  Every 4 hours     New EN Prescription Ordered?  No, recommended        Other Orders    Obtain Weight  Daily     Obtain Weight Ordered?  No, recommended     Supplement  Vitamin mineral supplement Renal MVI     Supplement Ordered?  No, recommended         Education/Evaluation     Row Name 01/15/19 1129          Education    Education  Education not appropriate at this time     Please explain  Patient confusion        Monitor/Evaluation    Monitor  Per protocol;I&O;Supplement intake;Pertinent labs;TF delivery/tolerance;Weight;Skin status           Electronically signed by:  Starla William  RD  01/15/19 11:29 AM

## 2019-01-15 NOTE — PLAN OF CARE
Problem: Patient Care Overview  Goal: Plan of Care Review  Outcome: Ongoing (interventions implemented as appropriate)   01/15/19 8037   Coping/Psychosocial   Plan of Care Reviewed With patient   Plan of Care Review   Progress improving   OTHER   Outcome Summary pt slightly more awake       Problem: Fall Risk (Adult)  Goal: Absence of Fall  Outcome: Ongoing (interventions implemented as appropriate)      Problem: Skin Injury Risk (Adult)  Goal: Skin Health and Integrity  Outcome: Ongoing (interventions implemented as appropriate)      Problem: Nutrition, Enteral (Adult)  Goal: Signs and Symptoms of Listed Potential Problems Will be Absent, Minimized or Managed (Nutrition, Enteral)  Outcome: Ongoing (interventions implemented as appropriate)      Problem: Skin and Soft Tissue Infection (Adult)  Goal: Signs and Symptoms of Listed Potential Problems Will be Absent, Minimized or Managed (Skin and Soft Tissue Infection)  Outcome: Ongoing (interventions implemented as appropriate)

## 2019-01-15 NOTE — PROGRESS NOTES
[facility]    Name:  Millicent Mckeon   Age:  58 y.o.  Sex:  female  :  1960  MRN:  [id number]   Visit Number:  [id number]  Admission Date:  1/10/2019  Date Of Service:  01/15/19  Primary Care Physician:  Rosa Zamora MD     LOS: 5 days :  Patient Care Team:  Rosa Zamora MD as PCP - General (Internal Medicine)  Geremias Shepherd MD as Consulting Physician (General Surgery):    Chief Complaint:      Altered mental status, hypothermia and cellulitis.    Subjective / Interval History:     Ms. Mckeon is currently lying down on the bed and is more alert compared to yesterday.  Unfortunately she is not able to answer questions appropriately.  At times she does obey commands.  She did undergo right foot surgery done by Dr. Rueda for her ankle yesterday.  She has remained hemodynamically stable and her temperature has improved as well.  In fact, she is currently having hypertension with systolic blood pressure in the 170s.  She did have a right subclavian dialysis catheter placed by Dr. Trujillo.  She will be undergoing her first hemodialysis today.    She was transferred from [facility] emergency room on 1/10/2019 with symptoms of generalized weakness, altered mental status, shortness of breath and leg cellulitis.  She was noted to have hypothermia and hypotension and was placed on warming blanket on presentation.  She was placed on broad-spectrum IV antibiotic therapy with Zosyn and vancomycin and a consultation was obtained from Dr. Rueda from podiatry.  She was also noted to have acute renal failure and has been followed by Dr. Leone.  According to the sister Virginia, patient was in her normal state of health about 2 weeks ago since when she started having generalized weakness and worsening leg swelling.  She has morbid obesity and apparently uses a wheelchair.  She has had another visit to the emergency room recently and was evaluated with CAT  scan of the chest and abdomen and was told to have multiple lymph nodes.  She was subsequently recommended to go to [facility] for further evaluation of these lymph nodes.    According to the sister, who is the power of , patient has had progressive decline in her general health associated with generalized weakness in the last one week necessary dating the visit to the emergency room where they found her to have hypothermia.  Since admission to the ICU he had [facility], patient has not improved much with regards to her mental status.  Initial CT of the head was negative for any acute abnormalities.  Patient's body habitus is too large for the MRI scan.  There has been no history of any tonic-clonic convulsions.      Review of Systems:     I am unable to obtain complete review of systems due to her altered mental status.    Vital Signs:    Heart Rate:  [71-95] 75  Resp:  [13-18] 16  BP: (142-178)/(61-81) 147/66    Intake and output:    I/O last 3 completed shifts:  In: 2170 [I.V.:1408; Other:392; NG/GT:244; IV Piggyback:126]  Out: 9650 [Urine:9650]  I/O this shift:  In: 60 [P.O.:30; Other:30]  Out: 3795 [Urine:1795; Other:2000]    Physical Examination:    General Appearance:  Drowsy and opens eyes on call, not in any acute distress.   Head:  Atraumatic and normocephalic, without obvious abnormality.   Eyes:          PERRLA, conjunctivae and sclerae normal, no Icterus. No pallor. Doll's eye movement present.     Neck: Supple, short neck, trachea midline, no thyromegaly, no carotid bruit.   Lungs:   Chest shape is normal. Breath sounds decreased bilaterally due to thick chest wall.  No wheezing.  Bilateral scattered crackles heard. No Pleural rub or bronchial breathing.   Heart:  Normal S1 and S2, no murmur, no gallop, no rub. No JVD   Abdomen:   Normal bowel sounds, no masses, no organomegaly. Soft, non-tender, obese with a large pannus with healing ulcers noted on the  skin.   Extremities: Large lower extremities due to obesity with multiple skin folds that are indurated and hyperemic.  Superficial skin ulcer noted on the lateral aspect of the right leg without any purulent discharge.  Black discoloration of the right heel on the lateral aspect noted.  Wound noted on the medial aspect of the right thigh with purulent slough.  Excoriations noted on bilateral legs and feet.     Skin: As described above.  Please see nurse's notes and the p the chart for further details.     Neurologic: Drowsy but more alert today.  Not able to squeeze the hands.  Not very cooperative to check for motor power.     Laboratory results:    Results from last 7 days   Lab Units  01/15/19   0416  01/14/19   0414  01/13/19   0423   01/11/19   0419   SODIUM mmol/L  142  138  138   < >  135*   POTASSIUM mmol/L  4.2  4.2  4.8   < >  5.7*   CHLORIDE mmol/L  109*  109*  108*   < >  106   CO2 mmol/L  19.0*  19.0*  18.0*   < >  19.0*   BUN mg/dL  98*  82*  73*   < >  50*   CREATININE mg/dL  3.70*  4.00*  3.80*   < >  3.50*   CALCIUM mg/dL  7.7*  7.9*  8.1*   < >  8.0*   BILIRUBIN mg/dL   --   0.4  0.4   --   0.4   ALK PHOS U/L   --   272*  323*   --   305*   ALT (SGPT) U/L   --   27  34   --   37   AST (SGOT) U/L   --   16  21   --   28   GLUCOSE mg/dL  220*  181*  202*   < >  139*    < > = values in this interval not displayed.     Results from last 7 days   Lab Units  01/15/19   0418  01/14/19   0414  01/13/19   0423   WBC 10*3/mm3  19.82*  18.96*  22.61*   HEMOGLOBIN g/dL  8.1*  8.2*  8.6*   HEMATOCRIT %  26.2*  26.5*  27.1*   PLATELETS 10*3/mm3  83*  102*  131     Results from last 7 days   Lab Units  01/13/19   0901  01/10/19   2112   INR   1.41*  1.57*     Results from last 7 days   Lab Units  01/12/19   0431  01/10/19   2110   CK TOTAL U/L  40  35   TROPONIN I ng/mL   --   0.018     Results from last 7 days   Lab Units  01/11/19   0744  01/11/19   0735  01/10/19   2318  01/10/19   2313   BLOODCX   No growth  at 4 days  No growth at 4 days   --    --    URINECX    --    --    --   Mixed Chrissie Isolated   MRSA SCREEN CX    --    --   No Methicillin Resistant Staphylococcus aureus isolated   --      I have reviewed the patient's laboratory results.    Radiology results:    Imaging Results (last 24 hours)     Procedure Component Value Units Date/Time    CT Head Without Contrast [[id number]] Collected:  01/15/19 1150     Updated:  01/15/19 1154    Narrative:       CT HEAD WITHOUT CONTRAST-     HISTORY: Altered mental status.     TECHNIQUE: Contiguous axial images were obtained from the skull vertex  to the skull base without administration of contrast.     FINDINGS:   Of note, this exam was performed on 01/11/2019 but never dictated.  Reportedly, no inquiries from the clinical service regarding the report  were made and this exam is submitted to me for interpretation on  01/15/2019.     Per the technologist, the patient refused to keep arms down for exam and  positioning of the arms does result in some streak artifact. Allowing  for this, no hemorrhage, mass effect or midline shift is identified. The  basal cisterns are patent. Ventricles are not enlarged. Minimal areas of  decreased attenuation in the white matter are nonspecific but suggestive  of minimal chronic microvascular ischemic type changes. Minimal vascular  calcifications. Of note, the patient did undergo a repeated head CT on  01/13/2019 which was read as no acute intracranial process as well.       Impression:       No acute intracranial findings identified. Minimal chronic  appearing changes. If symptoms persist, or if there is concern for  ischemia, consider MRI.        This study was performed with techniques to keep radiation doses as low  as reasonably achievable (ALARA). Individualized dose reduction  techniques using automated exposure control or adjustment of mA and/or  kV according to the patient size were employed.      This report was finalized on  1/15/2019 11:52 AM by Jean Marie Schmitt MD.        I have reviewed the patient's radiology reports.    Medication Review:     I have reviewed the patients active and prn medications.       Cellulitis of both lower extremities    Anemia    Bilateral edema of lower extremity    Cellulitis of lower extremity    Acute on chronic renal failure (CMS/HCC)    GERD (gastroesophageal reflux disease)    Hyperkalemia    Essential hypertension    Hypothermia    Hypothyroidism    Type 2 diabetes mellitus with complication (CMS/HCC)    Vitamin B12 deficiency    Acute metabolic encephalopathy    Assessment:    1.  Sepsis with hypothermia, present on admission.  2.  Bilateral lower extremity cellulitis, present on admission.  3.  Acute metabolic encephalopathy, present on admission.  4.  Acute renal failure, present on admission.  5.  Hyperkalemia, present on admission.  6.  Acquired hypothyroidism, uncontrolled.  7.  Diabetes mellitus type 2 with nephropathy.  8.  Morbid obesity with a BMI of 63.  9.  Chronic venous insufficiency of the lower extremities.  10. Retrocrural, paratracheal, retro-peritoneal, bilateral inguinal lymphadenopathy, uncertain etiology.  11.  Left thyroid nodule 2 cm on recent CT scan.    Plan:    Ms. Mckeon seems to be slightly better with regards to her encephalopathy.  Her renal function is slightly better with creatinine at 2.7 but her BUN has worsened.  The exact etiology for the improvement could be related to improvement in her sepsis due to surgery of her right foot and continued antibiotic therapy.  She also did get IV levothyroxine yesterday which may have helped as well.    Patient will be continued on broad-spectrum IV antibiotic therapy with Zosyn and vancomycin.  Blood cultures have been negative so far.  I have discussed the patient's condition with Dr. Leone and he will order hemodialysis today.  Wound cultures from her right leg are growing Proteus.  She had a repeat CT of the head today which  did not show any new findings but showed chronic microvascular disease.    Patient's recent CT scan of the abdomen and pelvis done at [facility] emergency room noted several lymphadenopathy especially in the retroperitoneal and bilateral inguinal regions as well as paratracheal and retrocrural. Our initial CT scan reports done here did not mention these findings but I discussed with our radiologist on 1/14/2019 and he reviewed the scans done here and he stated that he does see the lymph nodes in the above-mentioned areas.  He also stated that the patient has a tiny nodule on the left lobe of the thyroid but he did not think it was 2 cm in size.    Since the patient's mentation has improved, I will discontinue EEG that was ordered yesterday.  Further recommendations depend upon her clinical course.  Unfortunately, her prognosis is extremely poor due to multiple comorbidities, morbid obesity as well as the presence of lymphadenopathy the etiology of which is uncertain at this time. She is on heparin for DVT prophylaxis.  She does have mild decrease in her platelet level to 83 today.  This is likely related to sepsis.  If it continues to drop I will discontinue heparin at that point.      Samson eRyes MD  01/15/19  4:21 PM    Dictated utilizing Dragon dictation.

## 2019-01-15 NOTE — PROGRESS NOTES
"Nephrology Progress Note.    LOS: 5 days    Patient Care Team:  Rosa Zamora MD as PCP - General (Internal Medicine)  Geremias Shepherd MD as Consulting Physician (General Surgery)    Chief Complaint:  No chief complaint on file.      Subjective     Follow up for FREDDY and related issues.    Interval History:     Patient Complaints: none    Patient seen and examined this morning.  Events from last night noted.  Patient still pretty much unresponsive.  Today she appears to be slightly better but still does not make much sense.      Review of Systems:    Patient is unresponsive no meaningful review of system is possible.      Objective     Vital Signs  /74   Pulse 92   Temp 95 °F (35 °C) (Oral)   Resp 16   Ht 165.1 cm (65\")   Wt (!) 171 kg (375 lb 14.4 oz)   SpO2 96%   BMI 62.55 kg/m²       No intake/output data recorded.    Intake/Output Summary (Last 24 hours) at 1/15/2019 0806  Last data filed at 1/15/2019 0600  Gross per 24 hour   Intake 1712 ml   Output 6650 ml   Net -4938 ml       Physical Exam:    General Appearance:  no acute distress,   HEENT: Oral mucosa dry, extra occular movements intact. Sclera clear.  Skin: Warm and dry  Neck: supple, no JVD, trachea midline  Lungs:Chest shape is normal. Breath sounds heard bilaterally equally. No crackles, No wheezing.   Heart: regular rate and rhythm. normal S1 and S2, no S3, no rub, peripheral pulses weak but palpable.  Abdomen: Obese, soft, non-tender,  present bowel sounds to auscultation  : no palpable bladder.  Extremities: 4+ edema, no cyanosis or clubbing.   Neuro: Unable to evaluate     Results Review:    Results from last 7 days   Lab Units  01/15/19   0416  01/14/19   0414  01/13/19   0423   01/11/19   0419   SODIUM mmol/L  142  138  138   < >  135*   POTASSIUM mmol/L  4.2  4.2  4.8   < >  5.7*   CHLORIDE mmol/L  109*  109*  108*   < >  106   CO2 mmol/L  19.0*  19.0*  18.0*   < >  19.0*   BUN mg/dL  98*  82*  73*   < >  50*   CREATININE mg/dL  " 3.70*  4.00*  3.80*   < >  3.50*   CALCIUM mg/dL  7.7*  7.9*  8.1*   < >  8.0*   BILIRUBIN mg/dL   --   0.4  0.4   --   0.4   ALK PHOS U/L   --   272*  323*   --   305*   ALT (SGPT) U/L   --   27  34   --   37   AST (SGOT) U/L   --   16  21   --   28   GLUCOSE mg/dL  220*  181*  202*   < >  139*    < > = values in this interval not displayed.       Estimated Creatinine Clearance: 26.3 mL/min (A) (by C-G formula based on SCr of 3.7 mg/dL (H)).    Results from last 7 days   Lab Units  01/12/19   0431  01/11/19   0419  01/10/19   2110   MAGNESIUM mg/dL   --   2.6*  2.5*   PHOSPHORUS mg/dL  7.9*  7.4*  6.8*       Results from last 7 days   Lab Units  01/12/19   0431   URIC ACID mg/dL  4.4       Results from last 7 days   Lab Units  01/15/19   0418  01/14/19   0414  01/13/19   0423  01/12/19   0431  01/11/19   0419   WBC 10*3/mm3  19.82*  18.96*  22.61*  20.07*  18.35*   HEMOGLOBIN g/dL  8.1*  8.2*  8.6*  8.2*  7.6*   PLATELETS 10*3/mm3  83*  102*  131  133  142       Results from last 7 days   Lab Units  01/13/19   0901  01/10/19   2112   INR   1.41*  1.57*         Imaging Results (last 24 hours)     Procedure Component Value Units Date/Time    XR Chest 1 View [[id number]] Collected:  01/14/19 1150     Updated:  01/14/19 1208    Narrative:       PORTABLE CHEST     INDICATION: Central line placement.     FINDINGS: Single frontal portable chest. There is a right-sided central  venous catheter which terminates near the proximal aspect of the  superior vena cava. Nasogastric tube extends below the diaphragm with  the tip out of the field-of-view but likely in the midstomach. No  pneumothorax. Cardiac silhouette appears mildly prominent. There are  perihilar infiltrates. No definite effusion.       Impression:       1. Findings are suggestive of mild edema.  2. Tubes and lines positioned as described.     This report was finalized on 1/14/2019 12:06 PM by Jean Marie Schmitt MD.          famotidine 20 mg Intravenous Daily   heparin  (porcine) 5,000 Units Subcutaneous Q8H   hydrocortisone sodium succinate 50 mg Intravenous Q12H   insulin regular 0-7 Units Subcutaneous Q6H   lactobacillus acidophilus 1 capsule Nasogastric Daily   levothyroxine 100 mcg Nasogastric Q AM   levothyroxine sodium 100 mcg Intravenous Daily   piperacillin-tazobactam 3.375 g Intravenous Once   piperacillin-tazobactam 3.375 g Intravenous Q12H   sodium chloride 3 mL Intravenous Q12H       bumetanide 1 mg/hr Last Rate: 1 mg/hr (01/15/19 0358)   Pharmacy Consult     Sodium chloride 25 mL/hr Last Rate: 25 mL/hr (01/13/19 1510)       Medication Review:   Current Facility-Administered Medications   Medication Dose Route Frequency Provider Last Rate Last Dose   • acetaminophen (TYLENOL) tablet 650 mg  650 mg Oral Q4H PRN Milad Leone MD, FASN       • bumetanide (BUMEX) 10 mg in Sodium chloride 0.9 % 100 mL (0.1 mg/mL) infusion  1 mg/hr Intravenous Continuous Benjamin Cruz MD 10 mL/hr at 01/15/19 0358 1 mg/hr at 01/15/19 0358   • dextrose (D50W) 25 g/ 50mL Intravenous Solution 25 g  25 g Intravenous Q15 Min PRN Milad Leone MD, FASN       • dextrose (GLUTOSE) oral gel 1 tube  1 tube Oral Q15 Min PRN Milad Leone MD, FASN       • famotidine (PEPCID) injection 20 mg  20 mg Intravenous Daily Liz Gregg MD   20 mg at 01/14/19 0843   • glucagon (human recombinant) (GLUCAGEN DIAGNOSTIC) injection 1 mg  1 mg Subcutaneous PRN Milad Leone MD, FASN       • glycerin 35 % liquid 35% 2 spray  2 spray Mouth/Throat Q2H PRN Maribel Wheeler DO   2 spray at 01/15/19 0529   • heparin (porcine) 5000 UNIT/ML injection 5,000 Units  5,000 Units Subcutaneous Q8H Liz Gregg MD   5,000 Units at 01/15/19 0529   • hydrALAZINE (APRESOLINE) injection 10 mg  10 mg Intravenous Q6H PRN Liz Gregg MD   10 mg at 01/15/19 0529   • hydrocortisone sodium succinate (Solu-CORTEF) injection 50 mg  50 mg Intravenous Q12H Liz Gregg MD   50 mg at 01/15/19 0038   •  insulin regular (humuLIN R,novoLIN R) injection 0-7 Units  0-7 Units Subcutaneous Q6H Liz Gregg MD   3 Units at 01/15/19 0529   • lactobacillus acidophilus (RISAQUAD) capsule 1 capsule  1 capsule Nasogastric Daily Samson Reyes MD   1 capsule at 01/14/19 1245   • levothyroxine (SYNTHROID, LEVOTHROID) tablet 100 mcg  100 mcg Nasogastric Q AM Samson Reyes MD   100 mcg at 01/15/19 0529   • levothyroxine sodium injection 100 mcg  100 mcg Intravenous Daily Samson Reyes MD   100 mcg at 01/14/19 1246   • Morphine sulfate (PF) injection 2 mg  2 mg Intravenous Q4H PRN Milad Leone MD, FASN        And   • naloxone (NARCAN) injection 0.4 mg  0.4 mg Intravenous Q5 Min PRN Milad Leone MD, FASN       • ondansetron (ZOFRAN) tablet 4 mg  4 mg Oral Q6H PRN Milad Leone MD, FASN        Or   • ondansetron ODT (ZOFRAN-ODT) disintegrating tablet 4 mg  4 mg Oral Q6H PRN Milad Leone MD, FASN        Or   • ondansetron (ZOFRAN) injection 4 mg  4 mg Intravenous Q6H PRN Milad Leone MD, FASN       • Pharmacy Consult   Does not apply Continuous PRN Samson Reyes MD       • piperacillin-tazobactam (ZOSYN) 3.375 g in iso-osmotic dextrose 50 ml (premix)  3.375 g Intravenous Once Samson Reyes MD       • piperacillin-tazobactam (ZOSYN) 3.375 g in iso-osmotic dextrose 50 ml (premix)  3.375 g Intravenous Q12H Samson Reyes MD   3.375 g at 01/14/19 2100   • sodium chloride 0.9 % flush 1-10 mL  1-10 mL Intravenous PRN Milad Leone MD, FASN       • sodium chloride 0.9 % flush 3 mL  3 mL Intravenous Q12H Milad Leone MD, FASN   3 mL at 01/14/19 2107   • Sodium chloride 0.9 % infusion  25 mL/hr Intravenous Continuous Liz Gregg MD 25 mL/hr at 01/13/19 1510 25 mL/hr at 01/13/19 1510   • zinc oxide 13 % cream   Topical BID PRN Milad Leone MD, FASN   1 application at 01/12/19 8594       Assessment/Plan         1.   Acute on chronic renal failure (CMS/HCC): It is likely secondary to hypotensive episodes as  well as the use of Bactrim.  There is possibility that she may be taking nonsteroidals and other hemodynamic abnormality is causing the current problem.  There is no bloody around at this time who can help make the decisions or tell us what was done at home.  Finally I have came to know about that she has a blind sister both of them try to work together to take care of each other.  Her sister is on dialysis and is my patient.  2.   Cellulitis of both lower extremities: Continue with the IV antibiotics.  She likely has an abscess on the foot that needs to be addressed  3.   Altered mental status: There is minimal improvement in her mental status.  4.   Anemia: Transfused as needed  5.   Bilateral edema of lower extremity:  she needs a central line placed the temporary dialysis catheter and use it as a central line and likely will need dialysis in the morning.  6.   GERD (gastroesophageal reflux disease)  7.   Hyperkalemia:  it is resolved at this point  8.   Hypertension: Under fair control  9.   Hypothermia  10.   Hypothyroidism  11.   Type 2 diabetes mellitus with complication (CMS/[facility])  12.   Vitamin B12 deficiency    Plan:    · Stop Bumex drip.  · She appears to develop to significant uremia will go ahead and dialyze her today and reassess on day-to-day basis.  · Continue with the IV antibiotics  · Surgical intervention incision and drainage done yesterday.    · Details were also discussed with the hospitalist service.    · Surveillance labs.  · Further recommendations will depend on clinical course of the patient during the current hospitalization.    · I also discussed the details with the nursing staff.  · Rest as ordered.    Milad Leone MD, FASN  01/15/19  8:06 AM    Dictated utilizing Dragon dictation.

## 2019-01-16 LAB
ANION GAP SERPL CALCULATED.3IONS-SCNC: 7.9 MMOL/L (ref 10–20)
ANISOCYTOSIS BLD QL: ABNORMAL
BACTERIA SPEC AEROBE CULT: ABNORMAL
BACTERIA SPEC AEROBE CULT: ABNORMAL
BACTERIA SPEC AEROBE CULT: NORMAL
BACTERIA SPEC AEROBE CULT: NORMAL
BLOOD CULTURE, ROUTINE: NORMAL
BUN BLD-MCNC: 77 MG/DL (ref 7–20)
BUN/CREAT SERPL: 28.5 (ref 7.1–23.5)
CALCIUM SPEC-SCNC: 7.8 MG/DL (ref 8.4–10.2)
CHLORIDE SERPL-SCNC: 109 MMOL/L (ref 98–107)
CO2 SERPL-SCNC: 25 MMOL/L (ref 26–30)
CREAT BLD-MCNC: 2.7 MG/DL (ref 0.6–1.3)
CULTURE, BLOOD 2: NORMAL
DEPRECATED RDW RBC AUTO: 60.1 FL (ref 37–54)
ELLIPTOCYTES BLD QL SMEAR: ABNORMAL
ERYTHROCYTE [DISTWIDTH] IN BLOOD BY AUTOMATED COUNT: 20.8 % (ref 11.5–14.5)
GFR SERPL CREATININE-BSD FRML MDRD: 18 ML/MIN/1.73
GFR SERPL CREATININE-BSD FRML MDRD: 22 ML/MIN/1.73
GLUCOSE BLD-MCNC: 253 MG/DL (ref 74–98)
GLUCOSE BLDC GLUCOMTR-MCNC: 196 MG/DL (ref 70–130)
GLUCOSE BLDC GLUCOMTR-MCNC: 240 MG/DL (ref 70–130)
GLUCOSE BLDC GLUCOMTR-MCNC: 252 MG/DL (ref 70–130)
GLUCOSE BLDC GLUCOMTR-MCNC: 284 MG/DL (ref 70–130)
GRAM STN SPEC: ABNORMAL
HAV IGM SERPL QL IA: NEGATIVE
HBV CORE IGM SERPL QL IA: NEGATIVE
HBV SURFACE AG SERPL QL IA: NEGATIVE
HCT VFR BLD AUTO: 26.8 % (ref 37–47)
HCV AB S/CO SERPL IA: <0.1 S/CO RATIO (ref 0–0.9)
HGB BLD-MCNC: 8.4 G/DL (ref 12–16)
HYPOCHROMIA BLD QL: ABNORMAL
LYMPHOCYTES # BLD MANUAL: 1.12 10*3/MM3 (ref 0.6–3.4)
LYMPHOCYTES NFR BLD MANUAL: 3 % (ref 0–12)
LYMPHOCYTES NFR BLD MANUAL: 8 % (ref 10–50)
MAGNESIUM SERPL-MCNC: 2.5 MG/DL (ref 1.6–2.3)
MCH RBC QN AUTO: 26 PG (ref 27–31)
MCHC RBC AUTO-ENTMCNC: 31.3 G/DL (ref 30–37)
MCV RBC AUTO: 83 FL (ref 81–99)
METAMYELOCYTES NFR BLD MANUAL: 3 % (ref 0–0)
MONOCYTES # BLD AUTO: 0.42 10*3/MM3 (ref 0–0.9)
MYELOCYTES NFR BLD MANUAL: 1 % (ref 0–0)
NEUTROPHILS # BLD AUTO: 11.89 10*3/MM3 (ref 2–6.9)
NEUTROPHILS NFR BLD MANUAL: 75 % (ref 37–80)
NEUTS BAND NFR BLD MANUAL: 10 % (ref 0–6)
PLATELET # BLD AUTO: 79 10*3/MM3 (ref 130–400)
PMV BLD AUTO: ABNORMAL FL (ref 6–12)
POLYCHROMASIA BLD QL SMEAR: ABNORMAL
POTASSIUM BLD-SCNC: 3.9 MMOL/L (ref 3.5–5.1)
RBC # BLD AUTO: 3.23 10*6/MM3 (ref 4.2–5.4)
SCAN SLIDE: NORMAL
SMALL PLATELETS BLD QL SMEAR: ABNORMAL
SODIUM BLD-SCNC: 138 MMOL/L (ref 137–145)
TOXIC GRANULATION: ABNORMAL
VANCOMYCIN SERPL-MCNC: 17.04 MCG/ML (ref 5–40)
WBC NRBC COR # BLD: 13.99 10*3/MM3 (ref 4.8–10.8)

## 2019-01-16 PROCEDURE — 99232 SBSQ HOSP IP/OBS MODERATE 35: CPT | Performed by: PODIATRIST

## 2019-01-16 PROCEDURE — 63710000001 INSULIN REGULAR HUMAN PER 5 UNITS: Performed by: HOSPITALIST

## 2019-01-16 PROCEDURE — 85007 BL SMEAR W/DIFF WBC COUNT: CPT | Performed by: INTERNAL MEDICINE

## 2019-01-16 PROCEDURE — 25010000002 ERTAPENEM PER 500 MG: Performed by: INTERNAL MEDICINE

## 2019-01-16 PROCEDURE — 80202 ASSAY OF VANCOMYCIN: CPT | Performed by: INTERNAL MEDICINE

## 2019-01-16 PROCEDURE — 90935 HEMODIALYSIS ONE EVALUATION: CPT

## 2019-01-16 PROCEDURE — 99222 1ST HOSP IP/OBS MODERATE 55: CPT | Performed by: INTERNAL MEDICINE

## 2019-01-16 PROCEDURE — 25010000002 HYDRALAZINE PER 20 MG: Performed by: HOSPITALIST

## 2019-01-16 PROCEDURE — 92610 EVALUATE SWALLOWING FUNCTION: CPT

## 2019-01-16 PROCEDURE — 83735 ASSAY OF MAGNESIUM: CPT | Performed by: INTERNAL MEDICINE

## 2019-01-16 PROCEDURE — 63710000001 INSULIN DETEMIR PER 5 UNITS: Performed by: INTERNAL MEDICINE

## 2019-01-16 PROCEDURE — 97166 OT EVAL MOD COMPLEX 45 MIN: CPT

## 2019-01-16 PROCEDURE — 25010000002 VANCOMYCIN 1 G RECONSTITUTED SOLUTION 1 EACH VIAL: Performed by: INTERNAL MEDICINE

## 2019-01-16 PROCEDURE — 25010000002 HEPARIN (PORCINE) PER 1000 UNITS: Performed by: HOSPITALIST

## 2019-01-16 PROCEDURE — 94799 UNLISTED PULMONARY SVC/PX: CPT

## 2019-01-16 PROCEDURE — 25010000002 PIPERACILLIN SOD-TAZOBACTAM PER 1 G: Performed by: INTERNAL MEDICINE

## 2019-01-16 PROCEDURE — 80048 BASIC METABOLIC PNL TOTAL CA: CPT | Performed by: INTERNAL MEDICINE

## 2019-01-16 PROCEDURE — 82962 GLUCOSE BLOOD TEST: CPT

## 2019-01-16 PROCEDURE — 25010000002 HYDROCORTISONE SODIUM SUCCINATE 100 MG RECONSTITUTED SOLUTION: Performed by: HOSPITALIST

## 2019-01-16 PROCEDURE — 85025 COMPLETE CBC W/AUTO DIFF WBC: CPT | Performed by: INTERNAL MEDICINE

## 2019-01-16 PROCEDURE — 97162 PT EVAL MOD COMPLEX 30 MIN: CPT

## 2019-01-16 PROCEDURE — 99233 SBSQ HOSP IP/OBS HIGH 50: CPT | Performed by: INTERNAL MEDICINE

## 2019-01-16 RX ADMIN — HYDRALAZINE HYDROCHLORIDE 25 MG: 25 TABLET ORAL at 21:05

## 2019-01-16 RX ADMIN — HUMAN INSULIN 4 UNITS: 100 INJECTION, SOLUTION SUBCUTANEOUS at 11:39

## 2019-01-16 RX ADMIN — HYDRALAZINE HYDROCHLORIDE 25 MG: 25 TABLET ORAL at 13:09

## 2019-01-16 RX ADMIN — HYDROCORTISONE SODIUM SUCCINATE 50 MG: 100 INJECTION, POWDER, FOR SOLUTION INTRAMUSCULAR; INTRAVENOUS at 13:09

## 2019-01-16 RX ADMIN — Medication 2 SPRAY: at 08:57

## 2019-01-16 RX ADMIN — SODIUM CHLORIDE, PRESERVATIVE FREE 3 ML: 5 INJECTION INTRAVENOUS at 21:06

## 2019-01-16 RX ADMIN — LEVOTHYROXINE SODIUM 100 MCG: 100 TABLET ORAL at 06:27

## 2019-01-16 RX ADMIN — HUMAN INSULIN 3 UNITS: 100 INJECTION, SOLUTION SUBCUTANEOUS at 00:01

## 2019-01-16 RX ADMIN — INSULIN DETEMIR 20 UNITS: 100 INJECTION, SOLUTION SUBCUTANEOUS at 06:28

## 2019-01-16 RX ADMIN — HYDRALAZINE HYDROCHLORIDE 25 MG: 25 TABLET ORAL at 06:29

## 2019-01-16 RX ADMIN — WHITE PETROLATUM 1 EACH: 450 STICK TOPICAL at 08:57

## 2019-01-16 RX ADMIN — Medication 1 CAPSULE: at 08:35

## 2019-01-16 RX ADMIN — HUMAN INSULIN 2 UNITS: 100 INJECTION, SOLUTION SUBCUTANEOUS at 18:05

## 2019-01-16 RX ADMIN — SODIUM CHLORIDE 500 MG: 9 INJECTION, SOLUTION INTRAVENOUS at 16:40

## 2019-01-16 RX ADMIN — HYDROCORTISONE SODIUM SUCCINATE 50 MG: 100 INJECTION, POWDER, FOR SOLUTION INTRAMUSCULAR; INTRAVENOUS at 00:01

## 2019-01-16 RX ADMIN — SODIUM CHLORIDE, PRESERVATIVE FREE 3 ML: 5 INJECTION INTRAVENOUS at 08:35

## 2019-01-16 RX ADMIN — FAMOTIDINE 20 MG: 10 INJECTION, SOLUTION INTRAVENOUS at 08:35

## 2019-01-16 RX ADMIN — HEPARIN SODIUM 5000 UNITS: 5000 INJECTION, SOLUTION INTRAVENOUS; SUBCUTANEOUS at 06:28

## 2019-01-16 RX ADMIN — WHITE PETROLATUM 1 EACH: 450 STICK TOPICAL at 11:55

## 2019-01-16 RX ADMIN — HUMAN INSULIN 4 UNITS: 100 INJECTION, SOLUTION SUBCUTANEOUS at 06:28

## 2019-01-16 RX ADMIN — HYDRALAZINE HYDROCHLORIDE 10 MG: 20 INJECTION INTRAMUSCULAR; INTRAVENOUS at 04:47

## 2019-01-16 RX ADMIN — TAZOBACTAM SODIUM AND PIPERACILLIN SODIUM 3.38 G: 375; 3 INJECTION, SOLUTION INTRAVENOUS at 08:35

## 2019-01-16 RX ADMIN — VANCOMYCIN HYDROCHLORIDE 1000 MG: 1 INJECTION, POWDER, LYOPHILIZED, FOR SOLUTION INTRAVENOUS at 18:05

## 2019-01-16 NOTE — PLAN OF CARE
Problem: Patient Care Overview  Goal: Plan of Care Review  Outcome: Ongoing (interventions implemented as appropriate)   01/16/19 5404   Coping/Psychosocial   Plan of Care Reviewed With patient   OTHER   Outcome Summary Limited PT eval completed.Pt presents with decreased strength, balance, endurance and independence with mobility. She is expected to benefit from continued skilled PT intervention to improve her mobility status prior to D/C. Wound vac order received. Hold placement since patient received Heparin this morning. Cont PT per POC to goals.

## 2019-01-16 NOTE — THERAPY EVALUATION
Acute Care - Speech Language Pathology   Swallow Initial Evaluation  Hdz     Patient Name: Millicent Mckeon  : 1960  MRN: [id number]  Today's Date: 2019  Onset of Illness/Injury or Date of Surgery: (P) 01/10/19     Referring Physician: Gregg (P)      Admit Date: 1/10/2019    Visit Dx:     ICD-10-CM ICD-9-CM   1. Altered mental status, unspecified altered mental status type R41.82 780.97   2. Cellulitis of both lower extremities L03.115 682.6    L03.116    3. Impaired functional mobility, balance, gait, and endurance Z74.09 V49.89     Patient Active Problem List   Diagnosis   • Altered mental status   • Anemia   • Bilateral edema of lower extremity   • Cellulitis of lower extremity   • Acute on chronic renal failure (CMS/HCC)   • GERD (gastroesophageal reflux disease)   • Hyperkalemia   • Essential hypertension   • Hypothermia   • Hypothyroidism   • Type 2 diabetes mellitus with complication (CMS/HCC)   • Vitamin B12 deficiency   • Cellulitis of both lower extremities   • Acute metabolic encephalopathy     Past Medical History:   Diagnosis Date   • Diabetes mellitus (CMS/HCC)    • Disease of thyroid gland    • GERD (gastroesophageal reflux disease)    • Hypertension      Past Surgical History:   Procedure Laterality Date   • EYE SURGERY     • INCISION AND DRAINAGE LEG Right 2019    Procedure: Right heel incision and drainage with graft application;  Surgeon: Ar Rueda DPM;  Location: [facility];  Service: Podiatry   • LEG SURGERY          SWALLOW EVALUATION (last 72 hours)      SLP Adult Swallow Evaluation     Row Name 19 1030                   Rehab Evaluation    Document Type  evaluation  -TM        Total Evaluation Minutes, SLP  14  -TM        Subjective Information  no complaints  -TM        Patient Observations  cooperative weakness  -TM        Patient/Family Observations   weakness  -TM        Patient Effort  adequate  -TM           General Information    Patient  Profile Reviewed  yes  -TM        Pertinent History Of Current Problem  encephalopathy, GERD, renal, DM, obesity  -TM        Current Method of Nutrition  NPO;nasogastric feedings  -TM        Precautions/Limitations, Hearing  WFL;for purposes of eval  -TM        Prior Level of Function-Communication  other (see comments) adequate for basic/medical needs  -TM        Prior Level of Function-Swallowing  no diet consistency restrictions  -TM        Plans/Goals Discussed with  patient;other (see comments) RN  -TM        Barriers to Rehab  medically complex  -TM        Patient's Goals for Discharge  patient did not state  -TM           Pain Assessment    Additional Documentation  Pain Scale: Numbers Pre/Post-Treatment (Group)  -TM           Pain Scale: Numbers Pre/Post-Treatment    Pain Scale: Numbers, Pretreatment  0/10 - no pain  -TM        Pain Scale: Numbers, Post-Treatment  0/10 - no pain  -TM           Oral Motor and Function    Oral Lesions or Structural Abnormalities and/or variants  light, white lingual coating suggestive of oral candida RN reported improving with oral care  -TM        Dentition Assessment  edentulous, does not have dentures  -TM        Secretion Management  WNL/WFL  -TM        Mucosal Quality  moist, healthy  -TM        Volitional Swallow  WFL  -TM        Volitional Cough  weak  -TM           Oral Musculature and Cranial Nerve Assessment    Oral Motor General Assessment  generalized oral motor weakness;oral labial or buccal impairment;lingual impairment  -TM        Oral Labial or Buccal Impairment, Detail, Cranial Nerve VII (Facial):  reduced strength bilaterally  -TM        Lingual Impairment, Detail. Cranial Nerves IX, XII (Glossopharyngeal and Hypoglossal)  reduced strength;bilaterally  -TM           General Eating/Swallowing Observations    Respiratory Support Currently in Use  nasal cannula  -TM        Eating/Swallowing Skills  fed by SLP  -TM        Positioning During Eating  upright in bed   -TM        Utensils Used  spoon;cup;straw  -TM        Consistencies Trialed  pureed;thin liquids;nectar/syrup-thick liquids;honey-thick liquids  -TM        Pre SpO2 (%)  97  -TM        Post SpO2 (%)  96  -TM           Respiratory    Respiratory Status  other (see comments) increase in depth of breaths (more labored) post swallows  -TM        Respiratory Pattern  decrease control/incoordination of breathing swallow  -TM           Clinical Swallow Eval    Oral Prep Phase  impaired  -TM        Oral Transit  WFL  -TM        Oral Residue  WFL  -TM        Pharyngeal Phase  suspected pharyngeal impairment  -TM        Esophageal Phase  suspected esophageal impairment prior dx of GERD  -TM        Clinical Swallow Evaluation Summary  Moderate oral phase dysphagia due to labial and lingual weakness (poor labial seal on spoon, but adequte w/straw), decreased lingual strength, white lingual coating suggestive of lingual candida, and edentulous.  Pt. exhibited some swallow-breathe dyscoordination, particularly with liquids of varying thickness.  Pt. exhibited intermittent cough with various trials/consistencies.  Suspect pharyngeal phase dysphagia.  Pt. not yet ready for full po diet even if modified.  Recommend:  1. continue NPO w/ng tf, allowing ice chips at times as javon,  2. meds via alternative method/IV as javon,  3. SLP to f/u to reassess tomorrow to see if better able to tolerate some po, as she has made improvements with alertness and strength in past couple of days.    -TM           Oral Prep Concerns    Oral Prep Concerns  incomplete or weak lip closure around spoon;increased prep time  -TM        Incomplete or Weak Lip Closure Around Spoon  other (see comments) pureed trials  -TM        Increased Prep Time  all consistencies  -TM           Pharyngeal Phase Concerns    Pharyngeal Phase Concerns  cough intermittent w/various consistencies  -TM        Cough  other (see comments);all consistencies post swallows with various  consistencies  -TM        Pharyngeal Phase Concerns, Comment  aspiration is a concern  -TM           Esophageal Phase Concerns    Esophageal Phase Concerns  other (see comments) prior dx of GERD  -TM           Clinical Impression    SLP Swallowing Diagnosis  moderate;oral dysfunction;suspected pharyngeal dysfunction;esophageal dysfunction  -TM        Functional Impact  risk of aspiration/pneumonia  -TM        Swallow Criteria for Skilled Therapeutic Interventions Met  demonstrates skilled criteria;other (see comments)  -TM           Recommendations    Therapy Frequency (Swallow)  PRN  -TM        Predicted Duration Therapy Intervention (Days)  until discharge  -TM        SLP Diet Recommendation  NPO;ice chips between meals after oral care, with supervision  -TM        Recommended Diagnostics  reassess via clinical swallow evaluation  -TM        SLP Rec. for Method of Medication Administration  meds via alternate route  -TM        Anticipated Dischage Disposition  unknown  -TM           Swallow Goals (SLP)    Oral Nutrition/Hydration Goal Selection (SLP)  oral nutrition/hydration, SLP goal 1  -TM        Labial Strengthening Goal Selection (SLP)  labial strengthening, SLP goal 1  -TM        Lingual Strengthening Goal Selection (SLP)  lingual strengthening, SLP goal 1  -TM        Additional Documentation  labial strengthening goal selection (SLP);lingual strengthening goal selection (SLP) other as needed pending f/u reassessment   -TM           Oral Nutrition/Hydration Goal 1 (SLP)    Oral Nutrition/Hydration Goal 1, SLP  tolerate po diet (pending reassessment)  -TM        Time Frame (Oral Nutrition/Hydration Goal 1, SLP)  by discharge  -TM        Barriers (Oral Nutrition/Hydration Goal 1, SLP)  medical complexity  -TM           Labial Strengthening Goal 1 (SLP)    Activity (Labial Strengthening Goal 1, SLP)  increase labial tone  -TM        Increase Labial Tone  labial resistance exercises;swallow trials  -TM         [place]/Accuracy (Labial Strengthening Goal 1, SLP)  independently (over 90% accuracy)  -TM        Time Frame (Labial Strengthening Goal 1, SLP)  by discharge  -TM        Barriers (Labial Strengthening Goal 1, SLP)  medical complexity  -TM           Lingual Strengthening Goal 1 (SLP)    Activity (Lingual Strengthening Goal 1, SLP)  increase lingual tone/sensation/control/coordination/movement;increase tongue back strength  -TM        Increase Lingual Tone/Sensation/Control/Coordination/Movement  lingual movement exercises;swallow trials;lingual resistance exercises  -TM        Increase Tongue Back Strength  lingual movement exercises;swallow trials;lingual resistance exercises  -TM        [place]/Accuracy (Lingual Strengthening Goal 1, SLP)  with minimal cues (75-90% accuracy)  -TM        Time Frame (Lingual Strengthening Goal 1, SLP)  by discharge  -TM        Barriers (Lingual Strengthening Goal 1, SLP)  medical complexity  -TM          User Key  (r) = Recorded By, (t) = Taken By, (c) = Cosigned By    Initials Name Effective Dates     [place] j[place] 03/07/18 -           EDUCATION  The patient has been educated in the following areas:   Dysphagia (Swallowing Impairment) NPO rationale.    SLP Recommendation and Plan  SLP Swallowing Diagnosis: moderate, oral dysfunction, suspected pharyngeal dysfunction, esophageal dysfunction  SLP Diet Recommendation: NPO, ice chips between meals after oral care, with supervision           Recommended Diagnostics: reassess via clinical swallow evaluation  Swallow Criteria for Skilled Therapeutic Interventions Met: demonstrates skilled criteria, other (see comments)  Anticipated Dischage Disposition: unknown     Therapy Frequency (Swallow): PRN  Predicted Duration Therapy Intervention (Days): until discharge       Plan of Care Reviewed With: patient  Plan of Care Review  Plan of Care Reviewed With: patient  Progress: improving  Outcome Summary: Bedside eval of swallow  completed.  Pt. cooperative.  Pt. exhibited moderate oral phase dysphagia and suspect pharyngeal phase dysphagia due to intermittent coughing post swallows with various consistencies/trials.  Due to pt.'s weakness and coughing with po trials, recommend continue NPO with ng tf with exception of ice chips as javon, meds via tf or IV as appropriate, and SLP to f/u to reassess tomorrow to see if she exhibits some increased strength and tolerance for po more consistently.  D/w pt. and RN.      SLP GOALS     Row Name 01/16/19 1030             Oral Nutrition/Hydration Goal 1 (SLP)    Oral Nutrition/Hydration Goal 1, SLP  tolerate po diet (pending reassessment)  -TM      Time Frame (Oral Nutrition/Hydration Goal 1, SLP)  by discharge  -TM      Barriers (Oral Nutrition/Hydration Goal 1, SLP)  medical complexity  -TM         Labial Strengthening Goal 1 (SLP)    Activity (Labial Strengthening Goal 1, SLP)  increase labial tone  -TM      Increase Labial Tone  labial resistance exercises;swallow trials  -TM      [place]/Accuracy (Labial Strengthening Goal 1, SLP)  independently (over 90% accuracy)  -TM      Time Frame (Labial Strengthening Goal 1, SLP)  by discharge  -TM      Barriers (Labial Strengthening Goal 1, SLP)  medical complexity  -TM         Lingual Strengthening Goal 1 (SLP)    Activity (Lingual Strengthening Goal 1, SLP)  increase lingual tone/sensation/control/coordination/movement;increase tongue back strength  -TM      Increase Lingual Tone/Sensation/Control/Coordination/Movement  lingual movement exercises;swallow trials;lingual resistance exercises  -TM      Increase Tongue Back Strength  lingual movement exercises;swallow trials;lingual resistance exercises  -TM      [place]/Accuracy (Lingual Strengthening Goal 1, SLP)  with minimal cues (75-90% accuracy)  -TM      Time Frame (Lingual Strengthening Goal 1, SLP)  by discharge  -TM      Barriers (Lingual Strengthening Goal 1, SLP)  medical complexity  -TM         User Key  (r) = Recorded By, (t) = Taken By, (c) = Cosigned By    Initials Name Provider Type    Bhavani Bradley Speech and Language Pathologist             Time Calculation:   Time Calculation- SLP     Row Name 01/16/19 1135             Time Calculation- SLP    SLP Start Time  1030  -TM      SLP Stop Time  1044  -TM      SLP Time Calculation (min)  14 min  -TM      SLP Received On  01/16/19  -        User Key  (r) = Recorded By, (t) = Taken By, (c) = Cosigned By    Initials Name Provider Type     Bhavani Wood Speech and Language Pathologist          Therapy Charges for Today     Code Description Service Date Service Provider Modifiers Qty    [id number] HC ST EVAL ORAL PHARYNG SWALLOW 4 1/16/2019 Bhavani Wood  1               Bhavani Wood  1/16/2019

## 2019-01-16 NOTE — CONSULTS
Subjective     CHIEF COMPLAINT: altered mental status    HISTORY OF PRESENT ILLNESS:  The patient is a 58 y.o. female, referred by Samson Reyes MD for diffuse lymphadenopathy. The patient was admitted to [facility] on 1/10/2019 with sepsis. She was transferred from [facility]. The patient was brought by ambulance to the ER secondary to AMS noted by her sister. She was hypotensive as well as hypothermic. bllod cultures has been negative. She had CT scans at the OSH that showed diffuse lymphadenopathy. I was consulted to further assist in her care.  When I saw the Patient today, she is laying comfortable in bed.  Her nurse is at the bedside.  She is awake.  She is able to answer some of my questions.  The patient lives with her sister and her event.  She does have transportations issues.    REVIEW OF SYSTEMS:  A 14 point review of systems was performed and is negative except as noted above.    Past Medical History:   Diagnosis Date   • Diabetes mellitus (CMS/HCC)    • Disease of thyroid gland    • GERD (gastroesophageal reflux disease)    • Hypertension        No current facility-administered medications on file prior to encounter.      Current Outpatient Medications on File Prior to Encounter   Medication Sig Dispense Refill   • ferrous sulfate 324 (65 Fe) MG tablet delayed-release EC tablet Take 324 mg by mouth 2 (Two) Times a Day.     • gabapentin (NEURONTIN) 600 MG tablet Take 600 mg by mouth 3 (Three) Times a Day.     • glipiZIDE (GLUCOTROL) 10 MG tablet Take 20 mg by mouth 2 (Two) Times a Day.     • Insulin Glargine (BASAGLAR KWIKPEN) 100 UNIT/ML injection pen Inject 30 Units under the skin into the appropriate area as directed Every Night.     • meclizine 25 MG chewable tablet chewable tablet Chew 25 mg 3 (Three) Times a Day As Needed (for dizziness or motion sickness).     • allopurinol (ZYLOPRIM) 300 MG tablet Take 300 mg by mouth Daily.     • amLODIPine (NORVASC) 5 MG tablet Take 5 mg by mouth Daily.  0    • esomeprazole (nexIUM) 40 MG capsule Take 40 mg by mouth Every Morning Before Breakfast.  0   • furosemide (LASIX) 20 MG tablet Take 20 mg by mouth 2 (Two) Times a Day.     • RA ASPIRIN EC 81 MG EC tablet Take 81 mg by mouth Daily.  0   • RA VITAMIN C 500 MG tablet Take 500 mg by mouth Daily.  0   • RA VITAMIN D-3 2000 units capsule Take 2,000 Units by mouth Daily.  0   • simvastatin (ZOCOR) 20 MG tablet Take 20 mg by mouth Every Night.  0   • TRADJENTA 5 MG tablet tablet Take 5 mg by mouth Daily.         Allergies   Allergen Reactions   • Metformin And Related Swelling     HA, diarrhea, throat swelling       Past Surgical History:   Procedure Laterality Date   • EYE SURGERY     • INCISION AND DRAINAGE LEG Right 1/14/2019    Procedure: Right heel incision and drainage with graft application;  Surgeon: Ar Rueda DPM;  Location: [facility];  Service: Podiatry   • LEG SURGERY         OB History   No data available       Social History     Socioeconomic History   • Marital status: Single     Spouse name: Not on file   • Number of children: Not on file   • Years of education: Not on file   • Highest education level: Not on file   Tobacco Use   • Smoking status: Never Smoker   • Smokeless tobacco: Never Used   Substance and Sexual Activity   • Alcohol use: No     Frequency: Never   • Drug use: No   • Sexual activity: Defer       Family History   Problem Relation Age of Onset   • Asthma Mother    • Hypertension Father    • Stroke Father        Objective     Vitals:    01/15/19 2002 01/15/19 2102 01/15/19 2111 01/15/19 2203   BP: 146/60 143/64 143/64 140/58   BP Location: Right arm Right arm Right arm Right arm   Patient Position: Lying Lying Lying Lying   Pulse: 71 71 76 72   Resp:       Temp:       TempSrc:       SpO2: 98% 98%  98%   Weight:       Height:                ECOG Performance Status: 3 - Symptomatic, >50% confined to bed  General: well appearing female in no acute distress  Neuro/Psych: A&O x 3, gait  steady, appropriate affect, strength 5/5 in all muscle groups  HEENT: sclera anicteric, oropharynx clear  Lymphatics: no cervical, supraclavicular, or axillary adenopathy  Cardiovascular: regular rate and rhythm, no murmurs  Lungs: clear to auscultation bilaterally  Abdomen: soft, nontender, nondistended.  No palpable organomegaly  Extremeties: no lower extremity edema  Skin: no rashes, lesions, bruising, or petechiae      Admission on 01/10/2019   No results displayed because visit has over 200 results.           No results found.    ASSESSMENT 59 YO female with leukocytosis    PROBLEM LIST AND DISCUSSION:  1. Leukocytosis; Imroving. Reactive. Mostly neutrophilia   2. Micorcytic anemia: Multifactorial including suppresed marrow as well as CKD stage IV.  3. Thrombocytopenia: Consumptive as well as drugs induced.  4. Diffuse lymphadenopathy: Unclear etiology. Reactive versus lymphoproliferative disorder. Will repeat scans as an out patient if she improves her performance status.    PLAN  1. I reviewed the patient's chart including her admission note, blood work results and imaging report. I reviewed the patient's films myself.  2. Giving the patient's poor performance status as well as multiple co morbidities I do not see a reason to further investigate her lymphadenopathy. I think it would be reasonable to repeat her imaging as an out patient if she improves her P.S.   3. Monitor CBC. Transfuse as needed for Hgb less than 7 or platelet less than 20.  4. Patient might benefit from rEPO.  5.  I will set the patient up to see me in [place] office and 1-2 months.  I will consider repeating her imaging at that point.  If patient cannot follow up with me in due to transportations issues I would recommend to repeat her scans down the road with her primary care provider.  Discussed with Dr. Reyes from the hospitalist service to coordinate patient's care.    Pedro Theodore MD    1/15/2019

## 2019-01-16 NOTE — PROGRESS NOTES
"Nephrology Progress Note.    LOS: 6 days    Patient Care Team:  Rosa Zamora MD as PCP - General (Internal Medicine)  Geremias Shepherd MD as Consulting Physician (General Surgery)    Chief Complaint:  No chief complaint on file.      Subjective     Follow up for FREDDY and related issues.    Interval History:     Patient Complaints: none    Patient seen and examined this morning.  Events from last night noted.  Today's of first day that she responded to verbal combined has been able to move her upper extremities and did say that she wants to go home.    Review of Systems:    Patient is unresponsive no meaningful review of system is possible.      Objective     Vital Signs  /70 (BP Location: Right arm, Patient Position: Lying)   Pulse 81   Temp 97.6 °F (36.4 °C) (Oral)   Resp 16   Ht 165.1 cm (65\")   Wt (!) 153 kg (336 lb 12.8 oz)   SpO2 97%   BMI 56.05 kg/m²       No intake/output data recorded.    Intake/Output Summary (Last 24 hours) at 1/16/2019 0717  Last data filed at 1/16/2019 0434  Gross per 24 hour   Intake 2144 ml   Output 4745 ml   Net -2601 ml       Physical Exam:    General Appearance:  no acute distress,   HEENT: Oral mucosa dry, extra occular movements intact. Sclera clear.  Skin: Warm and dry  Neck: supple, no JVD, trachea midline  Lungs:Chest shape is normal. Breath sounds heard bilaterally equally. No crackles, No wheezing.   Heart: regular rate and rhythm. normal S1 and S2, no S3, no rub, peripheral pulses weak but palpable.  Abdomen: Obese, soft, non-tender,  present bowel sounds to auscultation  : no palpable bladder.  Extremities: 4+ edema, no cyanosis or clubbing.   Neuro: Unable to evaluate     Results Review:    Results from last 7 days   Lab Units 01/16/19  0537 01/15/19  0416 01/14/19  0414 01/13/19  0423  01/11/19  0419   SODIUM mmol/L 138 142 138 138   < > 135*   POTASSIUM mmol/L 3.9 4.2 4.2 4.8   < > 5.7*   CHLORIDE mmol/L 109* 109* 109* 108*   < > 106   CO2 mmol/L 25.0* " 19.0* 19.0* 18.0*   < > 19.0*   BUN mg/dL 77* 98* 82* 73*   < > 50*   CREATININE mg/dL 2.70* 3.70* 4.00* 3.80*   < > 3.50*   CALCIUM mg/dL 7.8* 7.7* 7.9* 8.1*   < > 8.0*   BILIRUBIN mg/dL  --   --  0.4 0.4  --  0.4   ALK PHOS U/L  --   --  272* 323*  --  305*   ALT (SGPT) U/L  --   --  27 34  --  37   AST (SGOT) U/L  --   --  16 21  --  28   GLUCOSE mg/dL 253* 220* 181* 202*   < > 139*    < > = values in this interval not displayed.       Estimated Creatinine Clearance: 33.4 mL/min (A) (by C-G formula based on SCr of 2.7 mg/dL (H)).    Results from last 7 days   Lab Units 01/16/19  0537 01/12/19  0431 01/11/19  0419 01/10/19  2110   MAGNESIUM mg/dL 2.5*  --  2.6* 2.5*   PHOSPHORUS mg/dL  --  7.9* 7.4* 6.8*       Results from last 7 days   Lab Units 01/12/19  0431   URIC ACID mg/dL 4.4       Results from last 7 days   Lab Units 01/15/19  0418 01/14/19  0414 01/13/19  0423 01/12/19  0431 01/11/19 0419   WBC 10*3/mm3 19.82* 18.96* 22.61* 20.07* 18.35*   HEMOGLOBIN g/dL 8.1* 8.2* 8.6* 8.2* 7.6*   PLATELETS 10*3/mm3 83* 102* 131 133 142       Results from last 7 days   Lab Units 01/13/19  0901 01/10/19  2112   INR  1.41* 1.57*         Imaging Results (last 24 hours)     Procedure Component Value Units Date/Time    CT Head Without Contrast [[id number]] Collected:  01/15/19 1150     Updated:  01/15/19 1154    Narrative:       CT HEAD WITHOUT CONTRAST-     HISTORY: Altered mental status.     TECHNIQUE: Contiguous axial images were obtained from the skull vertex  to the skull base without administration of contrast.     FINDINGS:   Of note, this exam was performed on 01/11/2019 but never dictated.  Reportedly, no inquiries from the clinical service regarding the report  were made and this exam is submitted to me for interpretation on  01/15/2019.     Per the technologist, the patient refused to keep arms down for exam and  positioning of the arms does result in some streak artifact. Allowing  for this, no hemorrhage, mass  effect or midline shift is identified. The  basal cisterns are patent. Ventricles are not enlarged. Minimal areas of  decreased attenuation in the white matter are nonspecific but suggestive  of minimal chronic microvascular ischemic type changes. Minimal vascular  calcifications. Of note, the patient did undergo a repeated head CT on  01/13/2019 which was read as no acute intracranial process as well.       Impression:       No acute intracranial findings identified. Minimal chronic  appearing changes. If symptoms persist, or if there is concern for  ischemia, consider MRI.        This study was performed with techniques to keep radiation doses as low  as reasonably achievable (ALARA). Individualized dose reduction  techniques using automated exposure control or adjustment of mA and/or  kV according to the patient size were employed.      This report was finalized on 1/15/2019 11:52 AM by Jean Marie Schmitt MD.          famotidine 20 mg Intravenous Daily   heparin (porcine) 5,000 Units Subcutaneous Q8H   hydrALAZINE 25 mg Nasogastric Q8H   hydrocortisone sodium succinate 50 mg Intravenous Q12H   insulin detemir 20 Units Subcutaneous QAM   insulin regular 0-7 Units Subcutaneous Q6H   lactobacillus acidophilus 1 capsule Nasogastric Daily   levothyroxine 100 mcg Nasogastric Q AM   piperacillin-tazobactam 3.375 g Intravenous Q12H   sodium chloride 3 mL Intravenous Q12H       Pharmacy Consult     Sodium chloride 10 mL/hr Last Rate: 10 mL/hr (01/15/19 0902)       Medication Review:   Current Facility-Administered Medications   Medication Dose Route Frequency Provider Last Rate Last Dose   • acetaminophen (TYLENOL) tablet 650 mg  650 mg Oral Q4H PRN Milad Leone MD, FASN       • CHAPSTICK 1 each  1 each Apply externally Q1H PRN Samson Reyes MD   1 each at 01/15/19 2418   • dextrose (D50W) 25 g/ 50mL Intravenous Solution 25 g  25 g Intravenous Q15 Min PRN Milad Leone MD, FASN       • dextrose (GLUTOSE) oral gel 1 tube  1  tube Oral Q15 Min PRN Milad Leone MD, FASN       • famotidine (PEPCID) injection 20 mg  20 mg Intravenous Daily Liz Gregg MD   20 mg at 01/15/19 0850   • glucagon (human recombinant) (GLUCAGEN DIAGNOSTIC) injection 1 mg  1 mg Subcutaneous PRN Milad Leone MD, FASN       • glycerin 35 % liquid 35% 2 spray  2 spray Mouth/Throat Q2H PRN Maribel Wheeler DO   2 spray at 01/15/19 1844   • heparin (porcine) 5000 UNIT/ML injection 5,000 Units  5,000 Units Subcutaneous Q8H Liz Gregg MD   5,000 Units at 01/16/19 0628   • heparin (porcine) injection 2,400 Units  2,400 Units Intracatheter PRN Milad Leone MD, GILA   2,400 Units at 01/15/19 1502   • hydrALAZINE (APRESOLINE) injection 10 mg  10 mg Intravenous Q6H PRN Liz Gregg MD   10 mg at 01/16/19 0447   • hydrALAZINE (APRESOLINE) tablet 25 mg  25 mg Nasogastric Q8H Samson Reyes MD   25 mg at 01/16/19 0629   • hydrocortisone sodium succinate (Solu-CORTEF) injection 50 mg  50 mg Intravenous Q12H Liz Gregg MD   50 mg at 01/16/19 0001   • insulin detemir (LEVEMIR) injection 20 Units  20 Units Subcutaneous QAM Samson Reyes MD   20 Units at 01/16/19 0628   • insulin regular (humuLIN R,novoLIN R) injection 0-7 Units  0-7 Units Subcutaneous Q6H Liz Gregg MD   4 Units at 01/16/19 0628   • lactobacillus acidophilus (RISAQUAD) capsule 1 capsule  1 capsule Nasogastric Daily Samson Reyes MD   1 capsule at 01/15/19 0850   • levothyroxine (SYNTHROID, LEVOTHROID) tablet 100 mcg  100 mcg Nasogastric Q AM Samson Reyes MD   100 mcg at 01/16/19 0627   • LORazepam (ATIVAN) injection 0.5 mg  0.5 mg Intravenous Q6H PRN Samson Ryees MD       • Morphine sulfate (PF) injection 2 mg  2 mg Intravenous Q4H PRN Milad Leone MD, GILA        And   • naloxone (NARCAN) injection 0.4 mg  0.4 mg Intravenous Q5 Min PRN Milad Leone MD, FASN       • ondansetron (ZOFRAN) tablet 4 mg  4 mg Oral Q6H PRN Milad Leone MD, GILA        Or   •  ondansetron ODT (ZOFRAN-ODT) disintegrating tablet 4 mg  4 mg Oral Q6H PRN Milad Leone MD, FASN        Or   • ondansetron (ZOFRAN) injection 4 mg  4 mg Intravenous Q6H PRN Milad Leone MD, FASN       • Pharmacy Consult   Does not apply Continuous PRN Samson Reyes MD       • piperacillin-tazobactam (ZOSYN) 3.375 g in iso-osmotic dextrose 50 ml (premix)  3.375 g Intravenous Q12H Samson Reyes MD   3.375 g at 01/15/19 2007   • sodium chloride 0.9 % flush 1-10 mL  1-10 mL Intravenous PRN Milad Leone MD, FASN       • sodium chloride 0.9 % flush 3 mL  3 mL Intravenous Q12H Milad Leone MD, FASN   3 mL at 01/15/19 2111   • Sodium chloride 0.9 % infusion  10 mL/hr Intravenous Continuous Milad Leone MD, FASN 10 mL/hr at 01/15/19 0902 10 mL/hr at 01/15/19 0902   • vancomycin 1750 mg in sodium chloride 0.9% 500 mL IVPB  1,750 mg Intravenous PRN Ar Rueda DPM       • zinc oxide 13 % cream   Topical BID PRN Milad Leone MD, FASN   1 application at 01/12/19 0414       Assessment/Plan         1.   Acute on chronic renal failure (CMS/HCC): It is likely secondary to hypotensive episodes as well as the use of Bactrim.  There is possibility that she may be taking nonsteroidals and other hemodynamic abnormality is causing the current problem.  There is no bloody around at this time who can help make the decisions or tell us what was done at home.  Finally I have came to know about that she has a blind sister both of them try to work together to take care of each other.  Her sister is on dialysis and is my patient.  2.   Cellulitis of both lower extremities: Continue with the IV antibiotics.  She likely has an abscess on the foot that needs to be addressed  3.   Altered mental status: There is minimal improvement in her mental status.  4.   Anemia: Transfused as needed  5.   Bilateral edema of lower extremity:  she needs a central line placed the temporary dialysis catheter and use it as a central line and  likely will need dialysis in the morning.  6.   GERD (gastroesophageal reflux disease)  7.   Hyperkalemia:  it is resolved at this point  8.   Hypertension: Under fair control  9.   Hypothermia  10.   Hypothyroidism  11.   Type 2 diabetes mellitus with complication (CMS/HCC)  12.   Vitamin B12 deficiency    Plan:    · Continue dialysis with increase ultrafiltration and optimize volume status.  · Clinically starting to improve.  · Details were also discussed with the hospitalist service.    · Surveillance labs.  · Further recommendations will depend on clinical course of the patient during the current hospitalization.    · I also discussed the details with the nursing staff.  · Rest as ordered.    Milad Leone MD, FASN  01/16/19  7:17 AM    Dictated utilizing Dragon dictation.

## 2019-01-16 NOTE — PROGRESS NOTES
Adult Nutrition  Assessment/PES    Patient Name:  Millicent Mckeon  YOB: 1960  MRN: [id number]  Admit Date:  1/10/2019    Assessment Date:  1/16/2019    Comments:  Rec. #1: Tube feeding formula changed to Replete with Fiber secondary to multiple loose stools noted per NSG. Replete with Fiber starting at 31 ml/hr advancing as tolerated to 54 ml/hr goal rate. Tube feeding to provide: ~1188 Kcal, 76 gm protein and 988.42 ml tube feeding water. Rec. #2: Free water flushes 50 ml Q 6 hours or 4 times per day. Rec. #3: Continue with ProStat 30 ml BID via feeding tube. RD to follow pt. Consult RD PRN.     Reason for Assessment     Row Name 01/16/19 8826          Reason for Assessment    Reason For Assessment  follow-up protocol;TF/PN     Diagnosis  cardiac disease;diabetes diagnosis/complications;fluid status;other (see comments);metabolic state Cellulitis, DM2, AMS, Bilateral Edema, Morbidly Obese, CKD, Sepsis, Metabolic encephalopathy, hypothyroidism, right heel ulcer, microcytic anemia     Identified At Risk by Screening Criteria  BMI;large or nonhealing wound, burn or pressure injury;MST SCORE 2+             Labs/Tests/Procedures/Meds     Row Name 01/16/19 1331          Labs/Procedures/Meds    Lab Results Reviewed  reviewed, pertinent     Lab Results Comments  High: Cl, BUN, HgbA1c, Gluc, Phosphorus, Cr, Mg   Low: Plt. Count, Albumin        Medications    Pertinent Medications Reviewed  reviewed             Nutrition Prescription Ordered     Row Name 01/16/19 1359          Nutrition Prescription PO    Current PO Diet  NPO        Nutrition Prescription EN    Enteral Route  NG     Product  Novasource Renal (Nepro)     Modulars  Liquid Protein (15 gm/30 mL)     Liquid Protein (15 gm/30 mL)  30 mL/1 packet     Protein Liquid Frequency  2 times a day     TF Delivery Method  Continuous     Continuous TF Goal Rate (mL/hr)  31 mL/hr     Continuous TF Current Rate (mL/hr)  31 mL/hr     Continuous TF Goal Volume  (mL)  682 mL     Continuous TF Current Volume (mL)  682 mL     Water flush (mL)   146 mL     Water Flush Frequency  Every 4 hours         Evaluation of Received Nutrient/Fluid Intake     Row Name 01/16/19 1332          PO Evaluation    Number of Days PO Intake Evaluated  Insufficient Data        EN Evaluation    Number of Days EN Intake Evaluated  3 days     EN Average Volume Delivered (mL/day)  596 mL/day     % Goal Volume   87 %     TF Tolerance  Other (comment) multiple loose bowel movements per NSG     HOB  Greater than or equal to 30 degress               Problem/Interventions:  Problem 1     Row Name 01/16/19 1333          Nutrition Diagnoses Problem 1    Problem 1  Overweight/Obesity     Etiology (related to)  Factors Affecting Nutrition     Food Habit/Preferences  Large Meals     Signs/Symptoms (evidenced by)  BMI     BMI  Greater than 40         Problem 2     Row Name 01/16/19 1333          Nutrition Diagnoses Problem 2    Problem 2  Impaired Nutrient Utilization     Etiology (related to)  Medical Diagnosis     Endocrine  DM Type 2     Renal  CKD     Signs/Symptoms (evidenced by)  Biochemical     Specific Labs Noted  Glucose;BUN;Creatinine;Mg++;Phosphorus;Chloride;Platelets         Problem 3     Row Name 01/16/19 1333          Nutrition Diagnoses Problem 3    Problem 3  Inadequate Intake/Infusion     Inadequate Intake Type  Oral     Macronutrient  Kcal;Carbohydrate;Fluid;Protein     Micronutrient  Vitamin;Mineral     Etiology (related to)  MNT for Treatment/Condition     Signs/Symptoms (evidenced by)  NPO         Problem 4     Row Name 01/16/19 1333          Nutrition Diagnoses Problem 4    Problem 4  Increased Nutrient Needs     Etiology (related to)  Medical Diagnosis     Infectious Disease  Sepsis     Skin  Non healing wound     Signs/Symptoms (evidenced by)  Other (comment);Report/Observation wound noted, sepsis diagnosis           Intervention Goal     Row Name 01/16/19 1334          Intervention Goal     General  Meet nutritional needs for age/condition     PO  -- continue NPO while receiving tube feeding     TF/PN  Adjust TF/PN     Transition  TF to PO         Nutrition Intervention     Row Name 01/16/19 1334          Nutrition Intervention    RD/Tech Action  Follow Tx progress;Recommend/ordered     Recommended/Ordered  EN         Nutrition Prescription     Row Name 01/16/19 1335          Nutrition Prescription PO    PO Prescription  -- continue NPO while receiving tube feeding        Nutrition Prescription EN    Enteral Prescription  Enteral begin/change     Enteral Route  NG     Product  Promote with Fiber     Modulars  Liquid Protein (15 gm/30 mL)     Liquid Protein (15 gm/30 mL)  30 mL/1 packet     Protein Liquid Frequency  2 times a day     TF Delivery Method  Continuous     Continuous TF Goal Rate (mL/hr)  54 mL/hr     Continuous TF Starting Rate (mL/hr)  31 mL/hr     Continuous TF Goal Volume (mL)  1188 mL     Continuous TF Starting Volume (mL)  682 mL     Water flush (mL)   50 mL     Water Flush Frequency  Other (comment) Q 6 hours     New EN Prescription Ordered?  Yes        Other Orders    Supplement  Vitamin mineral supplement renal MVI     Supplement Ordered?  No, recommended         Education/Evaluation     Row Name 01/16/19 1336          Education    Education  Education not appropriate at this time     Please explain  Patient confusion        Monitor/Evaluation    Monitor  Per protocol;I&O;Supplement intake;Pertinent labs;TF delivery/tolerance;Weight;Skin status           Electronically signed by:  Amelia Faria RD  01/16/19 1:38 PM

## 2019-01-16 NOTE — PROGRESS NOTES
Patient Care Team:  Rosa Zamora MD as PCP - General (Internal Medicine)  Geremias Shepherd MD as Consulting Physician (General Surgery)    Chief complaint right heel    Subjective .   58-year-old morbidly obese diabetic female 2 days status post debridement of a right heel wound.  She seen in the ICU today.  Still has the NG tube but she sitting up.  She actually tries to respond somewhat to my questions and is showing improved cognition.  She is able to respond to some of my requests and commands.  She is able to move her toes as well as squeeze my fingers upon request.      Review of Systems  All systems were reviewed and negative except for chief complaint.    History  Past Medical History:   Diagnosis Date   • Diabetes mellitus (CMS/HCC)    • Disease of thyroid gland    • GERD (gastroesophageal reflux disease)    • Hypertension    ,   Past Surgical History:   Procedure Laterality Date   • EYE SURGERY     • INCISION AND DRAINAGE LEG Right 1/14/2019    Procedure: Right heel incision and drainage with graft application;  Surgeon: Ar Rueda DPM;  Location: [facility];  Service: Podiatry   • LEG SURGERY     ,   Family History   Problem Relation Age of Onset   • Asthma Mother    • Hypertension Father    • Stroke Father    ,   Social History     Tobacco Use   • Smoking status: Never Smoker   • Smokeless tobacco: Never Used   Substance Use Topics   • Alcohol use: No     Frequency: Never   • Drug use: No   ,   Medications Prior to Admission   Medication Sig Dispense Refill Last Dose   • ferrous sulfate 324 (65 Fe) MG tablet delayed-release EC tablet Take 324 mg by mouth 2 (Two) Times a Day.      • gabapentin (NEURONTIN) 600 MG tablet Take 600 mg by mouth 3 (Three) Times a Day.      • glipiZIDE (GLUCOTROL) 10 MG tablet Take 20 mg by mouth 2 (Two) Times a Day.      • Insulin Glargine (BASAGLAR KWIKPEN) 100 UNIT/ML injection pen Inject 30 Units under the skin into the appropriate area as directed Every Night.    "   • meclizine 25 MG chewable tablet chewable tablet Chew 25 mg 3 (Three) Times a Day As Needed (for dizziness or motion sickness).      • allopurinol (ZYLOPRIM) 300 MG tablet Take 300 mg by mouth Daily.      • amLODIPine (NORVASC) 5 MG tablet Take 5 mg by mouth Daily.  0    • esomeprazole (nexIUM) 40 MG capsule Take 40 mg by mouth Every Morning Before Breakfast.  0    • furosemide (LASIX) 20 MG tablet Take 20 mg by mouth 2 (Two) Times a Day.      • RA ASPIRIN EC 81 MG EC tablet Take 81 mg by mouth Daily.  0    • RA VITAMIN C 500 MG tablet Take 500 mg by mouth Daily.  0    • RA VITAMIN D-3 2000 units capsule Take 2,000 Units by mouth Daily.  0    • simvastatin (ZOCOR) 20 MG tablet Take 20 mg by mouth Every Night.  0    • TRADJENTA 5 MG tablet tablet Take 5 mg by mouth Daily.      , Scheduled Meds:      ampicillin-sulbactam 3 g Intravenous Q24H   famotidine 20 mg Intravenous Daily   hydrALAZINE 25 mg Nasogastric Q8H   hydrocortisone sodium succinate 50 mg Intravenous Q12H   insulin detemir 20 Units Subcutaneous QAM   insulin regular 0-7 Units Subcutaneous Q6H   lactobacillus acidophilus 1 capsule Nasogastric Daily   levothyroxine 100 mcg Nasogastric Q AM   sodium chloride 3 mL Intravenous Q12H   vancomycin 1,000 mg Intravenous Once   , Continuous Infusions:      Pharmacy Consult     Sodium chloride 10 mL/hr Last Rate: 10 mL/hr (01/15/19 0902)   , PRN Meds:  •  acetaminophen  •  CHAPSTICK  •  dextrose  •  dextrose  •  glucagon (human recombinant)  •  glycerin  •  heparin (porcine)  •  hydrALAZINE  •  LORazepam  •  Morphine **AND** naloxone  •  ondansetron **OR** ondansetron ODT **OR** ondansetron  •  Pharmacy Consult  •  sodium chloride  •  vancomycin  •  zinc oxide and Allergies:  Metformin and related    Objective     Vital Signs   /65 (BP Location: Right arm, Patient Position: Sitting)   Pulse 77   Temp 98.2 °F (36.8 °C) (Oral)   Resp 15   Ht 165.1 cm (65\")   Wt (!) 153 kg (336 lb 12.8 oz)   SpO2 95%   " BMI 56.05 kg/m²     Physical Exam: Awake and currently not oriented to person place and time.  Dressing to the right foot is intact.  There is no bleeding or strikethrough presently.  She is able to plantarflex and dorsiflex her digits with commands.  Capillary refill time to the digits is brisk.         Results Review: Wound cultures are positive for ESBL producing Klebsiella as well as Proteus from varus.      Assessment/Plan   58-year-old morbidly obese diabetic female with multiple medical comorbidities including end-stage renal disease, bilateral lower extremity lymphedema/cellulitis, anemia.  2 days status post wound debridement and graft placement of the right heel wound.      Cellulitis of both lower extremities    Anemia    Bilateral edema of lower extremity    Cellulitis of lower extremity    Acute on chronic renal failure (CMS/HCC)    GERD (gastroesophageal reflux disease)    Hyperkalemia    Essential hypertension    Hypothermia    Hypothyroidism    Type 2 diabetes mellitus with complication (CMS/HCC)    Vitamin B12 deficiency    Acute metabolic encephalopathy  Her drainage and bleeding from the wound seems to be slowing down.  Physical and be very difficult to completely stop or manage given that she is on heparin every 8 hours.  I discussed that wound VAC application with physical therapy yesterday evening and we may have to wait an additional day or 2 before applying the wound VAC, or at least until her anticoagulation has some hopefully slow down our dosage decreased.  Her cognition seems to be improving and I'm hopeful maybe within the next day or 2 she is able to communicate more.  All her other medical comorbidities are being managed per the hospitalist service including her antibiotics.  Now that we have more definitive culture results we should be able to treat her accordingly.  I also think to hemodialysis is helping her.  I'll continue to monitor her wound and change her dressings as needed.   Continue to offload both heels.        Ar Rueda, RONEY  01/16/19  12:23 PM

## 2019-01-16 NOTE — THERAPY EVALUATION
Acute Care - Occupational Therapy Initial Evaluation   [facility]     Patient Name: Millicent Mckeon  : 1960  MRN: [id number]  Today's Date: 2019  Onset of Illness/Injury or Date of Surgery: 01/10/19  Date of Referral to OT: 19  Referring Physician: Dr. Gregg    Admit Date: 1/10/2019       ICD-10-CM ICD-9-CM   1. Altered mental status, unspecified altered mental status type R41.82 780.97   2. Cellulitis of both lower extremities L03.115 682.6    L03.116    3. Impaired functional mobility, balance, gait, and endurance Z74.09 V49.89   4. Impaired mobility and ADLs Z74.09 799.89     Patient Active Problem List   Diagnosis   • Altered mental status   • Anemia   • Bilateral edema of lower extremity   • Cellulitis of lower extremity   • Acute on chronic renal failure (CMS/HCC)   • GERD (gastroesophageal reflux disease)   • Hyperkalemia   • Essential hypertension   • Hypothermia   • Hypothyroidism   • Type 2 diabetes mellitus with complication (CMS/HCC)   • Vitamin B12 deficiency   • Cellulitis of both lower extremities   • Acute metabolic encephalopathy     Past Medical History:   Diagnosis Date   • Diabetes mellitus (CMS/HCC)    • Disease of thyroid gland    • GERD (gastroesophageal reflux disease)    • Hypertension      Past Surgical History:   Procedure Laterality Date   • EYE SURGERY     • INCISION AND DRAINAGE LEG Right 2019    Procedure: Right heel incision and drainage with graft application;  Surgeon: Ar Rueda DPM;  Location: [facility];  Service: Podiatry   • LEG SURGERY            OT ASSESSMENT FLOWSHEET (last 72 hours)      Occupational Therapy Evaluation     Row Name 19 1111                   OT Evaluation Time/Intention    Subjective Information  no complaints  -SD        Document Type  evaluation  -SD        Mode of Treatment  occupational therapy  -SD        Patient Effort  fair  -SD        Symptoms Noted During/After Treatment  fatigue  -SD           General  Information    Patient Profile Reviewed?  yes  -SD        Onset of Illness/Injury or Date of Surgery  01/10/19  -SD        Referring Physician  Dr. Gregg  -SD        Patient Observations  alert;cooperative;agree to therapy  -SD        General Observations of Patient  [place], on 2L O2, IV, NG and catheter intact  -SD        Prior Level of Function  independent:;community mobility  -SD        Equipment Currently Used at Home  none  -SD        Pertinent History of Current Functional Problem  Cellulitis of B LEs  -SD        Existing Precautions/Restrictions  fall;oxygen therapy device and L/min  -SD        Risks Reviewed  patient:;increased discomfort  -SD        Benefits Reviewed  patient:;improve function;increase independence;increase strength;increase balance  -SD        Barriers to Rehab  medically complex  -SD           Relationship/Environment    Primary Source of Support/Comfort  sibling(s)  -SD        Lives With  sibling(s)  -SD           Resource/Environmental Concerns    Current Living Arrangements  home/apartment/condo  -SD        Transportation Concerns  car, none  -SD           Home Main Entrance    Number of Stairs, Main Entrance  two  -SD           Stairs Within Home, Primary    Stairs, Within Home, Primary  stairs to 2nd floor; doesn't access  -SD           Cognitive Assessment/Intervention- PT/OT    Orientation Status (Cognition)  oriented x 4  -SD        Follows Commands (Cognition)  delayed response/completion;increased processing time needed;initiation impaired;physical/tactile prompts required;repetition of directions required;verbal cues/prompting required  -SD        Safety Deficit (Cognitive)  safety precautions follow-through/compliance;safety precautions awareness  -SD           Safety Issues, Functional Mobility    Safety Issues Affecting Function (Mobility)  safety precautions follow-through/compliance;safety precaution awareness;sequencing abilities  -SD        Impairments Affecting  Function (Mobility)  endurance/activity tolerance;postural/trunk control;strength  -SD           Bed Mobility Assessment/Treatment    Comment (Bed Mobility)  Not able to assess  -SD           Functional Mobility    Functional Mobility- Ind. Level  not appropriate to assess  -SD           Transfer Assessment/Treatment    Comment (Transfers)  Not able to assess  -SD           ADL Assessment/Intervention    BADL Assessment/Intervention  bathing;upper body dressing;lower body dressing;grooming;feeding;toileting  -SD           Bathing Assessment/Intervention    Bathing [place] Level  dependent (less than 25% patient effort)  -SD           Upper Body Dressing Assessment/Training    Upper Body Dressing [place] Level  dependent (less than 25% patient effort)  -SD           Lower Body Dressing Assessment/Training    Lower Body Dressing [place] Level  dependent (less than 25% patient effort)  -SD           Grooming Assessment/Training    [place] Level (Grooming)  dependent (less than 25% patient effort)  -SD           Self-Feeding Assessment/Training    [place] Level (Feeding)  dependent (less than 25% patient effort)  -SD           Toileting Assessment/Training    [place] Level (Toileting)  dependent (less than 25% patient effort)  -SD           BADL Safety/Performance    Impairments, BADL Safety/Performance  endurance/activity tolerance;grasp/prehension;coordination;motor control;range of motion;strength;trunk/postural control  -SD           General ROM    GENERAL ROM COMMENTS  PROM WFL  -SD           MMT (Manual Muscle Testing)    General MMT Comments  2/5  -SD           Positioning and Restraints    Pre-Treatment Position  in bed  -SD        Post Treatment Position  bed  -SD        In Bed  supine;call light within reach;encouraged to call for assist  -SD           Pain Scale: Numbers Pre/Post-Treatment    Pain Scale: Numbers, Pretreatment  0/10 - no pain  -SD        Pain Scale: Numbers,  Post-Treatment  0/10 - no pain  -SD           Wound 01/10/19 1925 Right foot    Wound - Properties Group Date first assessed: 01/10/19  -CH Time first assessed: 1925  -CH Side: Right  -CH Location: foot  -CH Stage, Pressure Injury: deep tissue injury;unstageable  -CH Additional Comments: right heel.  -CH       Wound 01/10/19 1925 Right lower;lateral leg abrasion    Wound - Properties Group Date first assessed: 01/10/19  -CH Time first assessed: 1925  -CH Side: Right  -CH Orientation: lower;lateral  -CH Location: leg  -CH Type: abrasion  -CH Stage, Pressure Injury: Stage 1  -CH       Wound 01/10/19 1925 Left anterior;lower;proximal leg unspecified    Wound - Properties Group Date first assessed: 01/10/19  -CH Time first assessed: 1925  -CH Side: Left  -CH Orientation: anterior;lower;proximal  -CH Location: leg  -CH Type: unspecified  -CH       Wound 01/10/19 1925 Left gluteal abrasion    Wound - Properties Group Date first assessed: 01/10/19  -CH Time first assessed: 1925  -CH Present On Admission : yes;picture taken  -CH Side: Left  -CH Location: gluteal  -CH Type: abrasion  -CH       Wound 01/10/19 1925 Right posterior thigh abrasion    Wound - Properties Group Date first assessed: 01/10/19  -CH Time first assessed: 1925  -CH Present On Admission : yes;picture taken  -CH Side: Right  -CH Orientation: posterior  -CH Location: thigh  -CH Type: abrasion  -CH       Wound 01/10/19 1925    Wound - Properties Group Date first assessed: 01/10/19  -CH Time first assessed: 1925  -CH Present On Admission : yes;picture taken  -CH       Wound 01/10/19 1925 abdomen    Wound - Properties Group Date first assessed: 01/10/19  -CH Time first assessed: 1925  -CH Present On Admission : yes;picture taken  -CH Location: abdomen  -CH       Wound 01/14/19 1400 Right foot incision    Wound - Properties Group Date first assessed: 01/14/19  -ADRIANA Time first assessed: 1400  -ADRIANA Side: Right  -ADRIANA Location: foot  -ADRIANA Type: incision  -ADRIANA        Coping    Observed Emotional State  flat  -SD        Verbalized Emotional State  acceptance  -SD           Plan of Care Review    Plan of Care Reviewed With  patient  -SD           Clinical Impression (OT)    Date of Referral to OT  01/11/19  -SD        OT Diagnosis  ADL decline  -SD        Patient/Family Goals Statement (OT Eval)  Increase strength and mobility  -SD        Criteria for Skilled Therapeutic Interventions Met (OT Eval)  yes  -SD        Rehab Potential (OT Eval)  fair, will monitor progress closely  -SD        Therapy Frequency (OT Eval)  daily Monday-Friday  -SD        Care Plan Review (OT)  evaluation/treatment results reviewed  -SD        Anticipated Discharge Disposition (OT)  inpatient rehabilitation facility  -SD           Vital Signs    Pre SpO2 (%)  97  -SD        O2 Delivery Pre Treatment  supplemental O2  -SD        Intra SpO2 (%)  97  -SD        O2 Delivery Intra Treatment  supplemental O2  -SD        Post SpO2 (%)  97  -SD        O2 Delivery Post Treatment  supplemental O2  -SD        Pre Patient Position  Supine  -SD        Intra Patient Position  Supine  -SD        Post Patient Position  Supine  -SD           Planned OT Interventions    Planned Therapy Interventions (OT Eval)  activity tolerance training;adaptive equipment training;BADL retraining;patient/caregiver education/training;strengthening exercise;transfer/mobility retraining  -SD           OT Goals    Bed Mobility Goal Selection (OT)  bed mobility, OT goal 1  -SD        Grooming Goal Selection (OT)  grooming, OT goal 1  -SD        Strength Goal Selection (OT)  strength, OT goal 1  -SD        Balance Goal Selection (OT)  balance, OT goal 1  -SD        Additional Documentation  Grooming Goal Selection (OT) (Row);Strength Goal Selection (OT) (Row);Balance Goal Selection (OT) (Row)  -SD           Bed Mobility Goal 1 (OT)    Activity/Assistive Device (Bed Mobility Goal 1, OT)  bed mobility activities, all  -SD        [place]  Level/Cues Needed (Bed Mobility Goal 1, OT)  maximum assist (25-49% patient effort)  -SD        Time Frame (Bed Mobility Goal 1, OT)  2 weeks  -SD        Progress/Outcomes (Bed Mobility Goal 1, OT)  goal ongoing  -SD           Grooming Goal 1 (OT)    Activity/Device (Grooming Goal 1, OT)  grooming skills, all  -SD        [place] (Grooming Goal 1, OT)  maximum assist (25-49% patient effort)  -SD        Time Frame (Grooming Goal 1, OT)  2 weeks  -SD        Progress/Outcome (Grooming Goal 1, OT)  goal ongoing  -SD           Strength Goal 1 (OT)    Strength Goal 1 (OT)  Pt to increase MMT score to 3/5 in order to improve participation with functional tasks.   -SD        Time Frame (Strength Goal 1, OT)  long term goal (LTG)  -SD        Progress/Outcome (Strength Goal 1, OT)  goal ongoing  -SD           Balance Goal 1 (OT)    Activity/Assistive Device (Balance Goal 1, OT)  sitting, static EOB   -SD        [place] Level/Cues Needed (Balance Goal 1, OT)  moderate assist (50-74% patient effort);maximum assist (25-49% patient effort)  -SD        Time Frame (Balance Goal 1, OT)  long term goal (LTG)  -SD        Progress/Outcomes (Balance Goal 1, OT)  goal ongoing  -SD           Living Environment    Home Accessibility  stairs to enter home  -SD          User Key  (r) = Recorded By, (t) = Taken By, (c) = Cosigned By    Initials Name Effective Dates    CH Jacqueline Munroe, JANAE 11/07/16 -     Hannah Williamson OT 03/07/18 -     Indio Downing RN 11/14/18 -          Occupational Therapy Education     Title: PT OT SLP Therapies (In Progress)     Topic: Occupational Therapy (In Progress)     Point: ADL training (Done)     Description: Instruct learner(s) on proper safety adaptation and remediation techniques during self care or transfers.   Instruct in proper use of assistive devices.    Learning Progress Summary           Patient Acceptance, E,TB, VU by SD at 1/16/2019  1:20 PM    Comment:  Benefit of OT; OT POC                                User Key     Initials Effective Dates Name Provider Type Discipline    SD 03/07/18 -  Hannah Santos OT Occupational Therapist OT                  OT Recommendation and Plan  Outcome Summary/Treatment Plan (OT)  Anticipated Discharge Disposition (OT): inpatient rehabilitation facility  Planned Therapy Interventions (OT Eval): activity tolerance training, adaptive equipment training, BADL retraining, patient/caregiver education/training, strengthening exercise, transfer/mobility retraining  Therapy Frequency (OT Eval): daily(Monday-Friday)  Plan of Care Review  Plan of Care Reviewed With: patient  Plan of Care Reviewed With: patient  Outcome Summary: OT eval completed. Patient presents deficits in strength, endurance, balance, mobility, motor control and ADL performance. Patient expected to benefit from OT services to improve functional performace prior to DC.     Outcome Measures     Row Name 01/16/19 1111 01/16/19 1109          How much help from another person do you currently need...    Turning from your back to your side while in flat bed without using bedrails?  --  1  -LM     Moving from lying on back to sitting on the side of a flat bed without bedrails?  --  1  -LM     Moving to and from a bed to a chair (including a wheelchair)?  --  1  -LM     Standing up from a chair using your arms (e.g., wheelchair, bedside chair)?  --  1  -LM     Climbing 3-5 steps with a railing?  --  1  -LM     To walk in hospital room?  --  1  -LM     AM-PAC 6 Clicks Score  --  6  -LM        How much help from another is currently needed...    Putting on and taking off regular lower body clothing?  1  -SD  --     Bathing (including washing, rinsing, and drying)  1  -SD  --     Toileting (which includes using toilet bed pan or urinal)  1  -SD  --     Putting on and taking off regular upper body clothing  1  -SD  --     Taking care of personal grooming (such as brushing teeth)  1  -SD  --     Eating meals   1  -SD  --     Score  6  -SD  --        Functional Assessment    Outcome Measure Options  AM-PAC 6 Clicks Daily Activity (OT)  -SD  AM-PAC 6 Clicks Basic Mobility (PT)  -LM       User Key  (r) = Recorded By, (t) = Taken By, (c) = Cosigned By    Initials Name Provider Type    Manuela Acevedo, PT Physical Therapist    Hannah Williamson OT Occupational Therapist          Time Calculation:   Time Calculation- OT     Row Name 01/16/19 1321             Time Calculation- OT    OT Start Time  1111  -SD      OT Received On  01/16/19  -SD      OT Goal Re-Cert Due Date  01/26/19  -SD        User Key  (r) = Recorded By, (t) = Taken By, (c) = Cosigned By    Initials Name Provider Type    Hannah Williamson OT Occupational Therapist        Therapy Suggested Charges     Code   Minutes Charges    None           Therapy Charges for Today     Code Description Service Date Service Provider Modifiers Qty    [id number] HC OT EVAL MOD COMPLEXITY 4 1/16/2019 Hannah Santos OT GO 1               Hannah Santos OT  1/16/2019

## 2019-01-16 NOTE — PLAN OF CARE
Problem: Skin Injury Risk (Adult)  Goal: Identify Related Risk Factors and Signs and Symptoms  Outcome: Ongoing (interventions implemented as appropriate)    Goal: Skin Health and Integrity  Outcome: Ongoing (interventions implemented as appropriate)      Problem: Nutrition, Enteral (Adult)  Goal: Signs and Symptoms of Listed Potential Problems Will be Absent, Minimized or Managed (Nutrition, Enteral)  Outcome: Ongoing (interventions implemented as appropriate)

## 2019-01-16 NOTE — PLAN OF CARE
Problem: Patient Care Overview  Goal: Plan of Care Review  Outcome: Ongoing (interventions implemented as appropriate)   01/16/19 2938   Coping/Psychosocial   Plan of Care Reviewed With patient   Plan of Care Review   Progress improving   OTHER   Outcome Summary VSS, pt slightly more alert today; able to lift arms slightly and move feet today       Problem: Fall Risk (Adult)  Goal: Absence of Fall  Outcome: Ongoing (interventions implemented as appropriate)      Problem: Skin Injury Risk (Adult)  Goal: Skin Health and Integrity  Outcome: Ongoing (interventions implemented as appropriate)      Problem: Nutrition, Enteral (Adult)  Goal: Signs and Symptoms of Listed Potential Problems Will be Absent, Minimized or Managed (Nutrition, Enteral)  Outcome: Ongoing (interventions implemented as appropriate)      Problem: Skin and Soft Tissue Infection (Adult)  Goal: Signs and Symptoms of Listed Potential Problems Will be Absent, Minimized or Managed (Skin and Soft Tissue Infection)  Outcome: Ongoing (interventions implemented as appropriate)

## 2019-01-16 NOTE — THERAPY EVALUATION
Acute Care - Physical Therapy Initial Evaluation   [facility]dz     Patient Name: Millicent Mckeon  : 1960  MRN: [id number]  Today's Date: 2019   Onset of Illness/Injury or Date of Surgery: 01/10/19  Date of Referral to PT: 19  Referring Physician: Marysol      Admit Date: 1/10/2019    Visit Dx:     ICD-10-CM ICD-9-CM   1. Altered mental status, unspecified altered mental status type R41.82 780.97   2. Cellulitis of both lower extremities L03.115 682.6    L03.116    3. Impaired functional mobility, balance, gait, and endurance Z74.09 V49.89     Patient Active Problem List   Diagnosis   • Altered mental status   • Anemia   • Bilateral edema of lower extremity   • Cellulitis of lower extremity   • Acute on chronic renal failure (CMS/HCC)   • GERD (gastroesophageal reflux disease)   • Hyperkalemia   • Essential hypertension   • Hypothermia   • Hypothyroidism   • Type 2 diabetes mellitus with complication (CMS/HCC)   • Vitamin B12 deficiency   • Cellulitis of both lower extremities   • Acute metabolic encephalopathy     Past Medical History:   Diagnosis Date   • Diabetes mellitus (CMS/HCC)    • Disease of thyroid gland    • GERD (gastroesophageal reflux disease)    • Hypertension      Past Surgical History:   Procedure Laterality Date   • EYE SURGERY     • INCISION AND DRAINAGE LEG Right 2019    Procedure: Right heel incision and drainage with graft application;  Surgeon: Ar Rueda DPM;  Location: [facility];  Service: Podiatry   • LEG SURGERY          PT ASSESSMENT (last 12 hours)      Physical Therapy Evaluation     Row Name 19 1109          PT Evaluation Time/Intention    Subjective Information  no complaints  -LM     Document Type  evaluation  -LM     Mode of Treatment  physical therapy  -LM     Patient Effort  fair  -LM     Symptoms Noted During/After Treatment  fatigue  -LM     Row Name 19 110          General Information    Patient Profile Reviewed?  yes  -LM     Onset  of Illness/Injury or Date of Surgery  01/10/19  -LM     Referring Physician  Marysol  -LM     Patient Observations  alert;cooperative;agree to therapy  -LM     Patient/Family Observations  Pt received alone in room.  -LM     General Observations of Patient  Pt received in fowlers position.2 LPM O2 per n/c. IV,NG tube and urinary cath intact.  -LM     Prior Level of Function  independent:;community mobility  -LM     Equipment Currently Used at Home  none  -LM     Pertinent History of Current Functional Problem  Cellulitis of B LE's  -LM     Existing Precautions/Restrictions  fall;oxygen therapy device and L/min  -LM     Risks Reviewed  patient:;increased discomfort  -LM     Benefits Reviewed  patient:;improve function;increase independence  -LM     Barriers to Rehab  medically complex  -LM     Row Name 01/16/19 1109          Relationship/Environment    Lives With  sibling(s)  -LM     Row Name 01/16/19 1109          Resource/Environmental Concerns    Current Living Arrangements  home/apartment/condo  -LM     Row Name 01/16/19 1109          Home Main Entrance    Number of Stairs, Main Entrance  two  -LM     Row Name 01/16/19 1109          Stairs Within Home, Primary    Stairs, Within Home, Primary  Stairs to 2nd floor-pt does not access.  -LM     Row Name 01/16/19 1109          Cognitive Assessment/Intervention- PT/OT    Orientation Status (Cognition)  oriented x 4  -LM     Follows Commands (Cognition)  delayed response/completion;increased processing time needed;initiation impaired;physical/tactile prompts required;repetition of directions required;verbal cues/prompting required  -LM     Safety Deficit (Cognitive)  safety precautions follow-through/compliance  -LM     Row Name 01/16/19 1109          Safety Issues, Functional Mobility    Safety Issues Affecting Function (Mobility)  safety precautions follow-through/compliance  -LM     Impairments Affecting Function (Mobility)  endurance/activity  tolerance;postural/trunk control;strength  -LM     Row Name 01/16/19 1109          Bed Mobility Assessment/Treatment    Comment (Bed Mobility)  Not able to assess.  -LM     Row Name 01/16/19 1109          Transfer Assessment/Treatment    Comment (Transfers)  Not able to assess.  -LM     Row Name 01/16/19 1109          Gait/Stairs Assessment/Training    Comment (Gait/Stairs)  Not able to assess.  -LM     Row Name 01/16/19 1109          General ROM    GENERAL ROM COMMENTS  WFL PROM.  -LM     Row Name 01/16/19 1109          MMT (Manual Muscle Testing)    General MMT Comments  Unable to perform.  -LM     Row Name 01/16/19 1109          Pain Scale: Numbers Pre/Post-Treatment    Pain Scale: Numbers, Pretreatment  0/10 - no pain  -LM     Pain Scale: Numbers, Post-Treatment  0/10 - no pain  -LM     Row Name             Wound 01/10/19 1925 Right foot    Wound - Properties Group Date first assessed: 01/10/19  -CH Time first assessed: 1925  -CH Side: Right  -CH Location: foot  -CH Stage, Pressure Injury: deep tissue injury;unstageable  -CH Additional Comments: right heel.  -CH    Row Name             Wound 01/10/19 1925 Right lower;lateral leg abrasion    Wound - Properties Group Date first assessed: 01/10/19  -CH Time first assessed: 1925  -CH Side: Right  -CH Orientation: lower;lateral  -CH Location: leg  -CH Type: abrasion  -CH Stage, Pressure Injury: Stage 1  -CH    Row Name             Wound 01/10/19 1925 Left anterior;lower;proximal leg unspecified    Wound - Properties Group Date first assessed: 01/10/19  -CH Time first assessed: 1925  -CH Side: Left  -CH Orientation: anterior;lower;proximal  -CH Location: leg  -CH Type: unspecified  -CH    Row Name             Wound 01/10/19 1925 Left gluteal abrasion    Wound - Properties Group Date first assessed: 01/10/19  -CH Time first assessed: 1925  -CH Present On Admission : yes;picture taken  -CH Side: Left  -CH Location: gluteal  -CH Type: abrasion  -CH    Row Name              Wound 01/10/19 1925 Right posterior thigh abrasion    Wound - Properties Group Date first assessed: 01/10/19  -CH Time first assessed: 1925  -CH Present On Admission : yes;picture taken  -CH Side: Right  -CH Orientation: posterior  -CH Location: thigh  -CH Type: abrasion  -CH    Row Name             Wound 01/10/19 1925    Wound - Properties Group Date first assessed: 01/10/19  -CH Time first assessed: 1925  -CH Present On Admission : yes;picture taken  -CH    Row Name             Wound 01/10/19 1925 abdomen    Wound - Properties Group Date first assessed: 01/10/19  -CH Time first assessed: 1925  -CH Present On Admission : yes;picture taken  -CH Location: abdomen  -CH    Row Name             Wound 01/14/19 1400 Right foot incision    Wound - Properties Group Date first assessed: 01/14/19  -ADRIANA Time first assessed: 1400  -ADRIANA Side: Right  -ADRIANA Location: foot  -ADRIANA Type: incision  -ADRIANA    Row Name 01/16/19 1109          Coping    Observed Emotional State  calm;cooperative  -LM     Verbalized Emotional State  acceptance  -LM     Row Name 01/16/19 1109          Plan of Care Review    Plan of Care Reviewed With  patient  -LM     Row Name 01/16/19 1109          Physical Therapy Clinical Impression    Date of Referral to PT  01/11/19  -LM     PT Diagnosis (PT Clinical Impression)  Generalized weakness  -LM     Patient/Family Goals Statement (PT Clinical Impression)  None stated.  -LM     Criteria for Skilled Interventions Met (PT Clinical Impression)  yes;treatment indicated  -LM     Impairments Found (describe specific impairments)  aerobic capacity/endurance;gait, locomotion, and balance;integumentary integrity;joint integrity and mobility  -LM     Rehab Potential (PT Clinical Summary)  good, to achieve stated therapy goals  -LM     Care Plan Review (PT)  evaluation/treatment results reviewed;care plan/treatment goals reviewed;risks/benefits reviewed;current/potential barriers reviewed;patient/other agree to care plan   -LM     Row Name 01/16/19 1109          Vital Signs    Pre SpO2 (%)  97  -LM     O2 Delivery Pre Treatment  supplemental O2 2 LPM  -LM     Intra SpO2 (%)  97  -LM     O2 Delivery Intra Treatment  supplemental O2 2 LPM  -LM     Post SpO2 (%)  97  -LM     O2 Delivery Post Treatment  supplemental O2 2 LPM  -LM     Pre Patient Position  Supine  -LM     Intra Patient Position  Supine  -LM     Post Patient Position  Supine  -LM     Row Name 01/16/19 1109          Physical Therapy Goals    Bed Mobility Goal Selection (PT)  bed mobility, PT goal 1  -LM     Transfer Goal Selection (PT)  transfer, PT goal 1  -LM     Row Name 01/16/19 1109          Bed Mobility Goal 1 (PT)    Activity/Assistive Device (Bed Mobility Goal 1, PT)  sit to supine/supine to sit;bed rails  -LM     Wells Level/Cues Needed (Bed Mobility Goal 1, PT)  maximum assist (25-49% patient effort)  -LM     Time Frame (Bed Mobility Goal 1, PT)  2 weeks  -LM     Progress/Outcomes (Bed Mobility Goal 1, PT)  goal ongoing  -LM     Row Name 01/16/19 1109          Transfer Goal 1 (PT)    Activity/Assistive Device (Transfer Goal 1, PT)  sit-to-stand/stand-to-sit;bed-to-chair/chair-to-bed;walker, rolling  -LM     Wells Level/Cues Needed (Transfer Goal 1, PT)  maximum assist (25-49% patient effort)  -LM     Time Frame (Transfer Goal 1, PT)  2 weeks  -LM     Progress/Outcome (Transfer Goal 1, PT)  goal ongoing  -LM     Row Name 01/16/19 1109          Patient Education Goal (PT)    Activity (Patient Education Goal, PT)  Pt will perform B LE ther ex x15 reps.  -LM     Wells/Cues/Accuracy (Memory Goal 2, PT)  demonstrates adequately;independent;verbalizes understanding  -LM     Time Frame (Patient Education Goal, PT)  2 weeks  -LM     Progress/Outcome (Patient Education Goal, PT)  goal ongoing  -LM     Row Name 01/16/19 1109          Positioning and Restraints    Pre-Treatment Position  in bed  -LM     Post Treatment Position  bed  -LM     In Bed   supine;call light within reach;encouraged to call for assist  -     Row Name 01/16/19 1109          Living Environment    Home Accessibility  stairs to enter home;stairs within home  -       User Key  (r) = Recorded By, (t) = Taken By, (c) = Cosigned By    Initials Name Provider Type    CH Jacqueline Munroe, RN Registered Nurse    Manuela Acevedo, PT Physical Therapist    Indio Downing RN Registered Nurse        Physical Therapy Education     Title: PT OT SLP Therapies (Done)     Topic: Physical Therapy (Done)     Point: Mobility training (Done)     Learning Progress Summary           Patient Acceptance, E,TB, VU by  at 1/16/2019 11:45 AM    Comment:  Purpose of PT/POC.                   Point: Home exercise program (Done)     Learning Progress Summary           Patient Acceptance, E,TB, VU by  at 1/16/2019 11:45 AM    Comment:  Purpose of PT/POC.                   Point: Precautions (Done)     Learning Progress Summary           Patient Acceptance, E,TB, VU by  at 1/16/2019 11:45 AM    Comment:  Purpose of PT/POC.                               User Key     Initials Effective Dates Name Provider Type Discipline     04/03/18 -  Manuela Gomez, PT Physical Therapist PT              PT Recommendation and Plan  Anticipated Discharge Disposition (PT): inpatient rehabilitation facility  Planned Therapy Interventions (PT Eval): balance training, bed mobility training, gait training, home exercise program, patient/family education, strengthening, transfer training  Therapy Frequency (PT Clinical Impression): daily  Outcome Summary/Treatment Plan (PT)  Anticipated Discharge Disposition (PT): inpatient rehabilitation facility  Plan of Care Reviewed With: patient  Outcome Summary: Limited PT eval completed.Pt presents with decreased strength, balance, endurance and independence with mobility. She is expected to benefit from continued skilled PT intervention to improve her mobility status prior to D/C. Wound vac  order received. Hold placement since patient received Heparin this morning. Cont PT per POC to goals.  Outcome Measures     Row Name 01/16/19 1109             How much help from another person do you currently need...    Turning from your back to your side while in flat bed without using bedrails?  1  -LM      Moving from lying on back to sitting on the side of a flat bed without bedrails?  1  -LM      Moving to and from a bed to a chair (including a wheelchair)?  1  -LM      Standing up from a chair using your arms (e.g., wheelchair, bedside chair)?  1  -LM      Climbing 3-5 steps with a railing?  1  -LM      To walk in hospital room?  1  -LM      AM-PAC 6 Clicks Score  6  -LM         Functional Assessment    Outcome Measure Options  AM-PAC 6 Clicks Basic Mobility (PT)  -LM        User Key  (r) = Recorded By, (t) = Taken By, (c) = Cosigned By    Initials Name Provider Type    Manuela Acevedo, PT Physical Therapist         Time Calculation:   PT Charges     Row Name 01/16/19 1150             Time Calculation    Start Time  1109  -LM      PT Received On  01/16/19  -LM      PT Goal Re-Cert Due Date  01/26/19  -LM        User Key  (r) = Recorded By, (t) = Taken By, (c) = Cosigned By    Initials Name Provider Type    Manuela Acevedo, MIC Physical Therapist        Therapy Suggested Charges     Code   Minutes Charges    None           Therapy Charges for Today     Code Description Service Date Service Provider Modifiers Qty    [id number] HC PT EVAL MOD COMPLEXITY 4 1/16/2019 Manuela Gomez, PT GP 1          PT G-Codes  Outcome Measure Options: AM-PAC 6 Clicks Basic Mobility (PT)  AM-PAC 6 Clicks Score: 6      Manuela Gomez, PT  1/16/2019

## 2019-01-16 NOTE — PLAN OF CARE
Problem: Patient Care Overview  Goal: Plan of Care Review  Outcome: Ongoing (interventions implemented as appropriate)   01/16/19 9767   Coping/Psychosocial   Plan of Care Reviewed With patient   Plan of Care Review   Progress no change   OTHER   Outcome Summary OT eval completed. Patient presents deficits in strength, endurance, balance, mobility, motor control and ADL performance. Patient expected to benefit from OT services to improve functional performace prior to DC.

## 2019-01-16 NOTE — SIGNIFICANT NOTE
Spoke with Dr. Rueda regarding the NPWT order for the right foot wound.  The wound is still bleeding and she is scheduled blood thinners every 8 hours.  Dr. Rueda instructed PT to hold NPWT until the patient is more medically stable.

## 2019-01-16 NOTE — PLAN OF CARE
Problem: Patient Care Overview  Goal: Plan of Care Review  Outcome: Ongoing (interventions implemented as appropriate)   01/16/19 1127   Coping/Psychosocial   Plan of Care Reviewed With patient   Plan of Care Review   Progress improving   OTHER   Outcome Summary Bedside eval of swallow completed. Pt. cooperative. Pt. exhibited moderate oral phase dysphagia and suspect pharyngeal phase dysphagia due to intermittent coughing post swallows with various consistencies/trials. Due to pt.'s weakness and coughing with po trials, recommend continue NPO with ng tf with exception of ice chips as javon, meds via tf or IV as appropriate, and SLP to f/u to reassess tomorrow to see if she exhibits some increased strength and tolerance for po more consistently. D/w pt. and RN.

## 2019-01-17 LAB
ANION GAP SERPL CALCULATED.3IONS-SCNC: 13.5 MMOL/L (ref 10–20)
ANISOCYTOSIS BLD QL: NORMAL
BASOPHILS # BLD AUTO: 0.01 10*3/MM3 (ref 0–0.2)
BASOPHILS NFR BLD AUTO: 0.1 % (ref 0–2.5)
BUN BLD-MCNC: 61 MG/DL (ref 7–20)
BUN/CREAT SERPL: 29 (ref 7.1–23.5)
CALCIUM SPEC-SCNC: 8.4 MG/DL (ref 8.4–10.2)
CHLORIDE SERPL-SCNC: 109 MMOL/L (ref 98–107)
CO2 SERPL-SCNC: 22 MMOL/L (ref 26–30)
CREAT BLD-MCNC: 2.1 MG/DL (ref 0.6–1.3)
DEPRECATED RDW RBC AUTO: 61 FL (ref 37–54)
EOSINOPHIL # BLD AUTO: 0.04 10*3/MM3 (ref 0–0.7)
EOSINOPHIL NFR BLD AUTO: 0.4 % (ref 0–7)
ERYTHROCYTE [DISTWIDTH] IN BLOOD BY AUTOMATED COUNT: 21.1 % (ref 11.5–14.5)
GFR SERPL CREATININE-BSD FRML MDRD: 24 ML/MIN/1.73
GFR SERPL CREATININE-BSD FRML MDRD: 29 ML/MIN/1.73
GLUCOSE BLD-MCNC: 208 MG/DL (ref 74–98)
GLUCOSE BLDC GLUCOMTR-MCNC: 139 MG/DL (ref 70–130)
GLUCOSE BLDC GLUCOMTR-MCNC: 152 MG/DL (ref 70–130)
GLUCOSE BLDC GLUCOMTR-MCNC: 154 MG/DL (ref 70–130)
GLUCOSE BLDC GLUCOMTR-MCNC: 220 MG/DL (ref 70–130)
GLUCOSE BLDC GLUCOMTR-MCNC: 223 MG/DL (ref 70–130)
HBV CORE AB SER DONR QL IA: NEGATIVE
HBV CORE IGM SERPL QL IA: NEGATIVE
HBV E AB SERPL QL IA: NEGATIVE
HBV E AG SERPL QL IA: NEGATIVE
HBV SURFACE AB SER QL: NON REACTIVE
HBV SURFACE AG SERPL QL IA: NEGATIVE
HCT VFR BLD AUTO: 26.8 % (ref 37–47)
HCV AB S/CO SERPL IA: 0.1 S/CO RATIO (ref 0–0.9)
HGB BLD-MCNC: 8.2 G/DL (ref 12–16)
HYPOCHROMIA BLD QL: NORMAL
IMM GRANULOCYTES # BLD AUTO: 0.19 10*3/MM3 (ref 0–0.06)
IMM GRANULOCYTES NFR BLD AUTO: 1.7 % (ref 0–0.6)
LABORATORY COMMENT REPORT: NORMAL
LYMPHOCYTES # BLD AUTO: 0.76 10*3/MM3 (ref 0.6–3.4)
LYMPHOCYTES NFR BLD AUTO: 6.9 % (ref 10–50)
MCH RBC QN AUTO: 25.7 PG (ref 27–31)
MCHC RBC AUTO-ENTMCNC: 30.6 G/DL (ref 30–37)
MCV RBC AUTO: 84 FL (ref 81–99)
MONOCYTES # BLD AUTO: 0.26 10*3/MM3 (ref 0–0.9)
MONOCYTES NFR BLD AUTO: 2.4 % (ref 0–12)
NEUTROPHILS # BLD AUTO: 9.74 10*3/MM3 (ref 2–6.9)
NEUTROPHILS NFR BLD AUTO: 88.5 % (ref 37–80)
NRBC BLD AUTO-RTO: 0 /100 WBC (ref 0–0)
PLATELET # BLD AUTO: 108 10*3/MM3 (ref 130–400)
PMV BLD AUTO: 11.2 FL (ref 6–12)
POIKILOCYTOSIS BLD QL SMEAR: NORMAL
POTASSIUM BLD-SCNC: 3.5 MMOL/L (ref 3.5–5.1)
RBC # BLD AUTO: 3.19 10*6/MM3 (ref 4.2–5.4)
SMALL PLATELETS BLD QL SMEAR: NORMAL
SODIUM BLD-SCNC: 141 MMOL/L (ref 137–145)
WBC MORPH BLD: NORMAL
WBC NRBC COR # BLD: 11 10*3/MM3 (ref 4.8–10.8)

## 2019-01-17 PROCEDURE — 25010000002 PROMETHAZINE PER 50 MG: Performed by: INTERNAL MEDICINE

## 2019-01-17 PROCEDURE — 25010000002 ONDANSETRON PER 1 MG: Performed by: INTERNAL MEDICINE

## 2019-01-17 PROCEDURE — 97530 THERAPEUTIC ACTIVITIES: CPT

## 2019-01-17 PROCEDURE — 25010000002 ERTAPENEM PER 500 MG: Performed by: INTERNAL MEDICINE

## 2019-01-17 PROCEDURE — 80048 BASIC METABOLIC PNL TOTAL CA: CPT | Performed by: INTERNAL MEDICINE

## 2019-01-17 PROCEDURE — 82962 GLUCOSE BLOOD TEST: CPT

## 2019-01-17 PROCEDURE — 85007 BL SMEAR W/DIFF WBC COUNT: CPT | Performed by: INTERNAL MEDICINE

## 2019-01-17 PROCEDURE — 85025 COMPLETE CBC W/AUTO DIFF WBC: CPT | Performed by: INTERNAL MEDICINE

## 2019-01-17 PROCEDURE — 92610 EVALUATE SWALLOWING FUNCTION: CPT

## 2019-01-17 PROCEDURE — 25010000002 HYDROCORTISONE SODIUM SUCCINATE 100 MG RECONSTITUTED SOLUTION: Performed by: HOSPITALIST

## 2019-01-17 PROCEDURE — 99232 SBSQ HOSP IP/OBS MODERATE 35: CPT | Performed by: INTERNAL MEDICINE

## 2019-01-17 PROCEDURE — 63710000001 INSULIN REGULAR HUMAN PER 5 UNITS: Performed by: HOSPITALIST

## 2019-01-17 PROCEDURE — 63710000001 INSULIN DETEMIR PER 5 UNITS: Performed by: INTERNAL MEDICINE

## 2019-01-17 PROCEDURE — 25010000002 HYDRALAZINE PER 20 MG: Performed by: HOSPITALIST

## 2019-01-17 PROCEDURE — 99232 SBSQ HOSP IP/OBS MODERATE 35: CPT | Performed by: PODIATRIST

## 2019-01-17 RX ORDER — PROMETHAZINE HYDROCHLORIDE 25 MG/ML
12.5 INJECTION, SOLUTION INTRAMUSCULAR; INTRAVENOUS EVERY 6 HOURS PRN
Status: DISCONTINUED | OUTPATIENT
Start: 2019-01-17 | End: 2019-01-22 | Stop reason: HOSPADM

## 2019-01-17 RX ADMIN — LEVOTHYROXINE SODIUM 100 MCG: 100 TABLET ORAL at 05:21

## 2019-01-17 RX ADMIN — HYDRALAZINE HYDROCHLORIDE 25 MG: 25 TABLET ORAL at 05:21

## 2019-01-17 RX ADMIN — FAMOTIDINE 20 MG: 10 INJECTION, SOLUTION INTRAVENOUS at 09:13

## 2019-01-17 RX ADMIN — ONDANSETRON 4 MG: 2 INJECTION INTRAMUSCULAR; INTRAVENOUS at 11:54

## 2019-01-17 RX ADMIN — HYDROCORTISONE SODIUM SUCCINATE 50 MG: 100 INJECTION, POWDER, FOR SOLUTION INTRAMUSCULAR; INTRAVENOUS at 00:34

## 2019-01-17 RX ADMIN — PROMETHAZINE HYDROCHLORIDE 12.5 MG: 25 INJECTION INTRAMUSCULAR; INTRAVENOUS at 14:32

## 2019-01-17 RX ADMIN — HYDRALAZINE HYDROCHLORIDE 10 MG: 20 INJECTION INTRAMUSCULAR; INTRAVENOUS at 17:48

## 2019-01-17 RX ADMIN — HUMAN INSULIN 3 UNITS: 100 INJECTION, SOLUTION SUBCUTANEOUS at 00:34

## 2019-01-17 RX ADMIN — Medication: at 22:04

## 2019-01-17 RX ADMIN — INSULIN DETEMIR 30 UNITS: 100 INJECTION, SOLUTION SUBCUTANEOUS at 06:30

## 2019-01-17 RX ADMIN — HUMAN INSULIN 3 UNITS: 100 INJECTION, SOLUTION SUBCUTANEOUS at 06:30

## 2019-01-17 RX ADMIN — HYDROCORTISONE SODIUM SUCCINATE 50 MG: 100 INJECTION, POWDER, FOR SOLUTION INTRAMUSCULAR; INTRAVENOUS at 12:36

## 2019-01-17 RX ADMIN — Medication 1 CAPSULE: at 09:13

## 2019-01-17 RX ADMIN — SODIUM CHLORIDE, PRESERVATIVE FREE 3 ML: 5 INJECTION INTRAVENOUS at 22:03

## 2019-01-17 RX ADMIN — HYDRALAZINE HYDROCHLORIDE 25 MG: 25 TABLET ORAL at 22:05

## 2019-01-17 RX ADMIN — HYDRALAZINE HYDROCHLORIDE 25 MG: 25 TABLET ORAL at 14:32

## 2019-01-17 RX ADMIN — SODIUM CHLORIDE 500 MG: 9 INJECTION, SOLUTION INTRAVENOUS at 14:40

## 2019-01-17 NOTE — PROGRESS NOTES
[facility]    Name:  Millicent Mckeon   Age:  58 y.o.  Sex:  female  :  1960  MRN:  [id number]   Visit Number:  [id number]  Admission Date:  1/10/2019  Date Of Service:  19  Primary Care Physician:  Rosa Zamora MD     LOS: 7 days :  Patient Care Team:  Rosa Zamora MD as PCP - General (Internal Medicine)  Geremias Shepherd MD as Consulting Physician (General Surgery):    Chief Complaint:      Follow-up of altered mental status, hypothermia and cellulitis.    Subjective / Interval History:     Ms. Mckeon is currently lying down on the bed and is more alert compared to yesterday.  She was able to obey commands today.  She was seen by speech therapy and they have recommended puréed diet with nectar thick liquids.  Unfortunately patient had some nausea this morning and had a couple of episodes of vomiting requiring Zofran and Phenergan.  Her NG tube was pulled out in the afternoon.  No significant overnight events.    She was transferred from [facility] emergency room on 1/10/2019 with symptoms of generalized weakness, altered mental status, shortness of breath and leg cellulitis.  She was noted to have hypothermia and hypotension and was placed on warming blanket on presentation.  She was placed on broad-spectrum IV antibiotic therapy with Zosyn and vancomycin and a consultation was obtained from Dr. Rueda from podiatry.  She was also noted to have acute renal failure and has been followed by Dr. Leone.  According to the sister Virginia, patient was in her normal state of health about 2 weeks ago since when she started having generalized weakness and worsening leg swelling.  She has morbid obesity and apparently uses a wheelchair.  She has had another visit to the emergency room recently and was evaluated with CAT scan of the chest and abdomen and was told to have multiple lymph nodes.  She was subsequently recommended to go to  [facility] for further evaluation of these lymph nodes.    Patient did undergo right foot surgical debridement by Dr. Rueda on 1/14/2019.  Patient was also started on temporary hemodialysis on 1/15/2019.  After foot surgery, patient started improving slowly with regards to her mental status.  Patient was on tube feeds which he has tolerated well.    Review of Systems:     I am unable to obtain complete review of systems due to her altered mental status.  She denies any pain at this time.    Vital Signs:    Temp:  [97.2 °F (36.2 °C)-97.6 °F (36.4 °C)] 97.6 °F (36.4 °C)  Heart Rate:  [59-73] 73  Resp:  [11-18] 14  BP: (142-160)/(65-86) 160/86    Intake and output:    I/O last 3 completed shifts:  In: 2451 [P.O.:60; I.V.:681; Other:864; NG/GT:796; IV Piggyback:50]  Out: 6670 [Urine:2170; Other:4500]  I/O this shift:  In: 156 [I.V.:156]  Out: 680 [Urine:680]    Physical Examination:    General Appearance:  Alert and obeying commands, not in any acute distress.   Head:  Atraumatic and normocephalic, without obvious abnormality.   Eyes:          PERRLA, conjunctivae and sclerae normal, no Icterus. No pallor. Doll's eye movement present.     Neck: Supple, short neck, trachea midline, no thyromegaly, no carotid bruit.   Lungs:   Chest shape is normal. Breath sounds decreased bilaterally due to thick chest wall.  No wheezing.  Bilateral scattered crackles heard. No Pleural rub or bronchial breathing.   Heart:  Normal S1 and S2, no murmur, no gallop, no rub. No JVD   Abdomen:   Normal bowel sounds, no masses, no organomegaly. Soft, non-tender, obese with a large pannus with healing ulcers noted on the skin.   Extremities: Large lower extremities due to obesity with multiple skin folds that are indurated and hyperemic.  Superficial skin ulcer noted on the lateral aspect of the right leg without any purulent discharge.  Surgical bandage is noted on the right foot.  Wound noted on the medial aspect of  the right thigh with purulent slough.  Excoriations noted on bilateral legs and feet.     Skin: As described above.  Please see nurse's notes and the photographs in the chart for further details.     Neurologic: Alert and obeying commands.  She was able to squeeze the hands.  Not very cooperative to check for motor power.     Laboratory results:    Results from last 7 days   Lab Units 01/17/19 0428 01/16/19  0537 01/15/19  0416 01/14/19  0414 01/13/19 0423 01/11/19  0419   SODIUM mmol/L 141 138 142 138 138   < > 135*   POTASSIUM mmol/L 3.5 3.9 4.2 4.2 4.8   < > 5.7*   CHLORIDE mmol/L 109* 109* 109* 109* 108*   < > 106   CO2 mmol/L 22.0* 25.0* 19.0* 19.0* 18.0*   < > 19.0*   BUN mg/dL 61* 77* 98* 82* 73*   < > 50*   CREATININE mg/dL 2.10* 2.70* 3.70* 4.00* 3.80*   < > 3.50*   CALCIUM mg/dL 8.4 7.8* 7.7* 7.9* 8.1*   < > 8.0*   BILIRUBIN mg/dL  --   --   --  0.4 0.4  --  0.4   ALK PHOS U/L  --   --   --  272* 323*  --  305*   ALT (SGPT) U/L  --   --   --  27 34  --  37   AST (SGOT) U/L  --   --   --  16 21  --  28   GLUCOSE mg/dL 208* 253* 220* 181* 202*   < > 139*    < > = values in this interval not displayed.     Results from last 7 days   Lab Units 01/17/19  0428 01/16/19  0537 01/15/19  0418   WBC 10*3/mm3 11.00* 13.99* 19.82*   HEMOGLOBIN g/dL 8.2* 8.4* 8.1*   HEMATOCRIT % 26.8* 26.8* 26.2*   PLATELETS 10*3/mm3 108* 79* 83*     Results from last 7 days   Lab Units 01/13/19  0901 01/10/19  2112   INR  1.41* 1.57*     Results from last 7 days   Lab Units 01/12/19  0431 01/10/19  2110   CK TOTAL U/L 40 35   TROPONIN I ng/mL  --  0.018     Results from last 7 days   Lab Units 01/11/19  0744 01/11/19  0735 01/10/19  2318 01/10/19  2313   BLOODCX  No growth at 5 days No growth at 5 days  --   --    URINECX   --   --   --  Mixed Chrissie Isolated   MRSA SCREEN CX   --   --  No Methicillin Resistant Staphylococcus aureus isolated  --      I have reviewed the patient's laboratory results.    Radiology results:    Imaging  Results (last 24 hours)     ** No results found for the last 24 hours. **        Medication Review:     I have reviewed the patients active and prn medications.       Cellulitis of both lower extremities    Anemia    Bilateral edema of lower extremity    Cellulitis of lower extremity    Acute on chronic renal failure (CMS/HCC)    GERD (gastroesophageal reflux disease)    Hyperkalemia    Essential hypertension    Hypothermia    Hypothyroidism    Type 2 diabetes mellitus with complication (CMS/[facility])    Vitamin B12 deficiency    Acute metabolic encephalopathy    Assessment:    1.  Sepsis with hypothermia, present on admission, improving.  2.  Bilateral lower extremity cellulitis, present on admission.  3.  Acute metabolic encephalopathy, present on admission, improving.  4.  Acute renal failure, present on admission.  5.  Hyperkalemia, present on admission, improved.  6.  Acquired hypothyroidism, uncontrolled.  7.  Diabetes mellitus type 2 with nephropathy.  8.  Morbid obesity with a BMI of 63.  9.  Chronic venous insufficiency of the lower extremities.  10. Retrocrural, paratracheal, retro-peritoneal, bilateral inguinal lymphadenopathy, uncertain etiology.  11.  Left thyroid nodule 2 cm on recent CT scan.  12.  Thrombocytopenia likely secondary to sepsis.    Plan:    Ms. Mckeon continues to improve with regards to her encephalopathy.  Her sepsis has improved as well.  She is currently on Invanz and vancomycin.  Her nasal swab for MRSA has been negative.  Blood cultures have been negative so far.  I will discontinue vancomycin.  She is being followed by Dr. Leone and her renal function is slowly improving after dialysis.  Wound culture is growing ESBL Escherichia coli and she is on Invanz.  Her heparin DVT prophylaxis was discontinued due to thrombocytopenia and it is slowly improving.  Hopefully, we can restart her back on heparin tomorrow.      Patient will be continued on physical and occupational therapy.  We will  transfer her to the medical floor with telemetry today.  We will continue her on puréed diet as tolerated.    Patient's recent CT scan of the abdomen and pelvis done at [facility] emergency room noted several lymphadenopathy especially in the retroperitoneal and bilateral inguinal regions as well as paratracheal and retrocrural. Our initial CT scan reports done here did not mention these findings but I discussed with our radiologist on 1/14/2019 and he reviewed the scans done here and he stated that he does see the lymph nodes in the above-mentioned areas.  He also stated that the patient has a tiny nodule on the left lobe of the thyroid but he did not think it was 2 cm in size.  She was seen by Dr. Theodore from oncology on 1/16/2019.  He recommended outpatient follow-up for her lymphadenopathy and possible outpatient lymph node biopsy.       Samson Reyes MD  01/17/19  4:57 PM    Dictated utilizing Dragon dictation.

## 2019-01-17 NOTE — PROGRESS NOTES
[facility]    Name:  Millicent Mckeon   Age:  58 y.o.  Sex:  female  :  1960  MRN:  [id number]   Visit Number:  [id number]  Admission Date:  1/10/2019  Date Of Service:  19  Primary Care Physician:  Rosa Zamora MD     LOS: 6 days :  Patient Care Team:  Rosa Zamora MD as PCP - General (Internal Medicine)  Geremias Shepherd MD as Consulting Physician (General Surgery):    Chief Complaint:      Altered mental status, hypothermia and cellulitis.    Subjective / Interval History:     Ms. Mckeon is currently lying down on the bed and is more alert compared to yesterday.  She was able to obey commands today.  She is currently tolerating tube feeds.  She did undergo hemodialysis yesterday and will be getting another hemodialysis today.  Her temperature has been stable without any further episodes of hypothermia.  No significant overnight events.    She was transferred from [facility] emergency room on 1/10/2019 with symptoms of generalized weakness, altered mental status, shortness of breath and leg cellulitis.  She was noted to have hypothermia and hypotension and was placed on warming blanket on presentation.  She was placed on broad-spectrum IV antibiotic therapy with Zosyn and vancomycin and a consultation was obtained from Dr. Rueda from podiatry.  She was also noted to have acute renal failure and has been followed by Dr. Leone.  According to the sister Virginia, patient was in her normal state of health about 2 weeks ago since when she started having generalized weakness and worsening leg swelling.  She has morbid obesity and apparently uses a wheelchair.  She has had another visit to the emergency room recently and was evaluated with CAT scan of the chest and abdomen and was told to have multiple lymph nodes.  She was subsequently recommended to go to [facility] for further evaluation of these lymph  nodes.    According to the sister, who is the power of , patient has had progressive decline in her general health associated with generalized weakness in the last one week necessary dating the visit to the emergency room where they found her to have hypothermia.  Since admission to the ICU he had [facility], patient has not improved much with regards to her mental status.  Initial CT of the head was negative for any acute abnormalities.  Patient's body habitus is too large for the MRI scan.  There has been no history of any tonic-clonic convulsions.      Review of Systems:     I am unable to obtain complete review of systems due to her altered mental status.  She denies any pain at this time.    Vital Signs:    Temp:  [97.6 °F (36.4 °C)-98.6 °F (37 °C)] 98.1 °F (36.7 °C)  Heart Rate:  [64-82] 65  Resp:  [15-16] 15  BP: (138-164)/(58-79) 144/66    Intake and output:    I/O last 3 completed shifts:  In: 2991 [P.O.:90; I.V.:526; Other:1133; NG/GT:1192; IV Piggyback:50]  Out: 81539 [Urine:3615; Other:6500]  No intake/output data recorded.    Physical Examination:    General Appearance:  Alert and obeying commands, not in any acute distress.   Head:  Atraumatic and normocephalic, without obvious abnormality.   Eyes:          PERRLA, conjunctivae and sclerae normal, no Icterus. No pallor. Doll's eye movement present.     Neck: Supple, short neck, trachea midline, no thyromegaly, no carotid bruit.   Lungs:   Chest shape is normal. Breath sounds decreased bilaterally due to thick chest wall.  No wheezing.  Bilateral scattered crackles heard. No Pleural rub or bronchial breathing.   Heart:  Normal S1 and S2, no murmur, no gallop, no rub. No JVD   Abdomen:   Normal bowel sounds, no masses, no organomegaly. Soft, non-tender, obese with a large pannus with healing ulcers noted on the skin.   Extremities: Large lower extremities due to obesity with multiple skin folds that are indurated and hyperemic.   Superficial skin ulcer noted on the lateral aspect of the right leg without any purulent discharge.  Black discoloration of the right heel on the lateral aspect noted.  Wound noted on the medial aspect of the right thigh with purulent slough.  Excoriations noted on bilateral legs and feet.     Skin: As described above.  Please see nurse's notes and the p the chart for further details.     Neurologic: Alert and obeying commands.  She was able to squeeze the hands.  Not very cooperative to check for motor power.     Laboratory results:    Results from last 7 days   Lab Units 01/16/19  0537 01/15/19  0416 01/14/19  0414 01/13/19  0423  01/11/19  0419   SODIUM mmol/L 138 142 138 138   < > 135*   POTASSIUM mmol/L 3.9 4.2 4.2 4.8   < > 5.7*   CHLORIDE mmol/L 109* 109* 109* 108*   < > 106   CO2 mmol/L 25.0* 19.0* 19.0* 18.0*   < > 19.0*   BUN mg/dL 77* 98* 82* 73*   < > 50*   CREATININE mg/dL 2.70* 3.70* 4.00* 3.80*   < > 3.50*   CALCIUM mg/dL 7.8* 7.7* 7.9* 8.1*   < > 8.0*   BILIRUBIN mg/dL  --   --  0.4 0.4  --  0.4   ALK PHOS U/L  --   --  272* 323*  --  305*   ALT (SGPT) U/L  --   --  27 34  --  37   AST (SGOT) U/L  --   --  16 21  --  28   GLUCOSE mg/dL 253* 220* 181* 202*   < > 139*    < > = values in this interval not displayed.     Results from last 7 days   Lab Units 01/16/19  0537 01/15/19  0418 01/14/19 0414   WBC 10*3/mm3 13.99* 19.82* 18.96*   HEMOGLOBIN g/dL 8.4* 8.1* 8.2*   HEMATOCRIT % 26.8* 26.2* 26.5*   PLATELETS 10*3/mm3 79* 83* 102*     Results from last 7 days   Lab Units 01/13/19  0901 01/10/19  2112   INR  1.41* 1.57*     Results from last 7 days   Lab Units 01/12/19  0431 01/10/19  2110   CK TOTAL U/L 40 35   TROPONIN I ng/mL  --  0.018     Results from last 7 days   Lab Units 01/11/19  0744 01/11/19  0735 01/10/19  2318 01/10/19  2313   BLOODCX  No growth at 5 days No growth at 5 days  --   --    URINECX   --   --   --  Mixed Chrissie Isolated   MRSA SCREEN CX   --   --  No Methicillin Resistant  Staphylococcus aureus isolated  --      I have reviewed the patient's laboratory results.    Radiology results:    Imaging Results (last 24 hours)     ** No results found for the last 24 hours. **        Medication Review:     I have reviewed the patients active and prn medications.       Cellulitis of both lower extremities    Anemia    Bilateral edema of lower extremity    Cellulitis of lower extremity    Acute on chronic renal failure (CMS/HCC)    GERD (gastroesophageal reflux disease)    Hyperkalemia    Essential hypertension    Hypothermia    Hypothyroidism    Type 2 diabetes mellitus with complication (CMS/HCC)    Vitamin B12 deficiency    Acute metabolic encephalopathy    Assessment:    1.  Sepsis with hypothermia, present on admission, improving.  2.  Bilateral lower extremity cellulitis, present on admission.  3.  Acute metabolic encephalopathy, present on admission, improving.  4.  Acute renal failure, present on admission.  5.  Hyperkalemia, present on admission, improved.  6.  Acquired hypothyroidism, uncontrolled.  7.  Diabetes mellitus type 2 with nephropathy.  8.  Morbid obesity with a BMI of 63.  9.  Chronic venous insufficiency of the lower extremities.  10. Retrocrural, paratracheal, retro-peritoneal, bilateral inguinal lymphadenopathy, uncertain etiology.  11.  Left thyroid nodule 2 cm on recent CT scan.  12.  Thrombocytopenia likely secondary to sepsis.    Plan:    Ms. Mckeon continues to improve with regards to her encephalopathy.  Her renal function is slightly better with creatinine at 2.7.  She is being followed by Dr. Leone and I have discussed the condition with him.  She will be dialyzed again today.      Patient will be continued on broad-spectrum IV antibiotic therapy with Zosyn and vancomycin.  Blood cultures have been negative so far.  Wound culture is growing ESBL Escherichia coli and she will be placed over to [facility].  She does have thrombocytopenia and we will discontinue heparin  therapy at this time.  Her initial CT of the head did not show any new findings but showed chronic microvascular disease.    Patient's recent CT scan of the abdomen and pelvis done at [facility] emergency room noted several lymphadenopathy especially in the retroperitoneal and bilateral inguinal regions as well as paratracheal and retrocrural. Our initial CT scan reports done here did not mention these findings but I discussed with our radiologist on 1/14/2019 and he reviewed the scans done here and he stated that he does see the lymph nodes in the above-mentioned areas.  He also stated that the patient has a tiny nodule on the left lobe of the thyroid but he did not think it was 2 cm in size.  She was seen by Dr. Theodore from oncology today.  I discussed the patient's treatment plan with him.  He recommended outpatient follow-up for her lymphadenopathy and possible outpatient lymph node biopsy.     She will be continued to be monitored in the ICU today.  Hopefully should be able to transfer to the medical floor with telemetry tomorrow.  She will be continued on physical and occupational therapy.  She was also seen by speech therapy and they have recommended to continue nothing by mouth with tube feedings at this time.      Samson Reyes MD  01/16/19  7:59 PM    Dictated utilizing Dragon dictation.

## 2019-01-17 NOTE — PLAN OF CARE
Problem: Patient Care Overview  Goal: Plan of Care Review  Outcome: Ongoing (interventions implemented as appropriate)   01/17/19 3117   Coping/Psychosocial   Plan of Care Reviewed With patient   Plan of Care Review   Progress improving   OTHER   Outcome Summary PT tx completed. Pt more alert and interactive today but with nausea/vomiting. Performs trunk strengthening exercises in bed as indicated on care map. Cont to goals.

## 2019-01-17 NOTE — THERAPY TREATMENT NOTE
Acute Care - Physical Therapy Treatment Note   [facility]     Patient Name: Millicent Mckeon  : 1960  MRN: [id number]  Today's Date: 2019  Onset of Illness/Injury or Date of Surgery: 01/10/19  Date of Referral to PT: 19  Referring Physician: Dr. Gregg    Admit Date: 1/10/2019    Visit Dx:    ICD-10-CM ICD-9-CM   1. Altered mental status, unspecified altered mental status type R41.82 780.97   2. Cellulitis of both lower extremities L03.115 682.6    L03.116    3. Impaired functional mobility, balance, gait, and endurance Z74.09 V49.89   4. Impaired mobility and ADLs Z74.09 799.89   5. Dysphagia, oropharyngeal phase R13.12 787.22     Patient Active Problem List   Diagnosis   • Altered mental status   • Anemia   • Bilateral edema of lower extremity   • Cellulitis of lower extremity   • Acute on chronic renal failure (CMS/HCC)   • GERD (gastroesophageal reflux disease)   • Hyperkalemia   • Essential hypertension   • Hypothermia   • Hypothyroidism   • Type 2 diabetes mellitus with complication (CMS/HCC)   • Vitamin B12 deficiency   • Cellulitis of both lower extremities   • Acute metabolic encephalopathy       Therapy Treatment    Rehabilitation Treatment Summary     Row Name 19 1340 19 1336          Treatment Time/Intention    Discipline  occupational therapist  (Pended)   -SD  physical therapist  -LM     Document Type  therapy note (daily note)  (Pended)   -SD  therapy note (daily note)  -LM     Subjective Information  complains of;fatigue  (Pended)   -SD  complains of;fatigue;nausea/vomiting  -     Mode of Treatment  occupational therapy  (Pended)   -SD  physical therapy  -LM     Patient/Family Observations  Supine in bed, IV intact, on room air  (Pended)   -SD  Pt received in vilchis's position in bed.  -LM     Care Plan Review  care plan/treatment goals reviewed  (Pended)   -SD  care plan/treatment goals reviewed;patient/other agree to care plan  -     Therapy Frequency (PT  Clinical Impression)  --  daily  -LM     Patient Effort  adequate  (Pended)   -SD  fair  -LM     Existing Precautions/Restrictions  fall  (Pended)   -SD  fall;oxygen therapy device and L/min  -LM     Patient Response to Treatment  Pt experienced vomiting after drinking sip of nectar thick tea. RN said to keep NPO until she could contact [facility]   (Pended)   -SD  Fatigues quickly.  -LM     Recorded by [SD] Hannah Santos, OT 01/17/19 1518 [LM] Manuela Gomez, PT 01/17/19 1516     Row Name 01/17/19 1340             Vital Signs    Pre SpO2 (%)  97  (Pended)   -SD      O2 Delivery Pre Treatment  room air  (Pended)   -SD      Intra SpO2 (%)  96  (Pended)   -SD      O2 Delivery Intra Treatment  room air  (Pended)   -SD      Post SpO2 (%)  97  (Pended)   -SD      O2 Delivery Post Treatment  room air  (Pended)   -SD      Recorded by [SD] Hannah Santos, OT 01/17/19 1518      Row Name 01/17/19 1336             Bed Mobility Assessment/Treatment    Bed Mobility Assessment/Treatment  other (see comments) Longsit forward reaches;Trunk rotation  -LM      Bed Mobility, Safety Issues  decreased use of arms for pushing/pulling;decreased use of legs for bridging/pushing;impaired trunk control for bed mobility  -LM      Assistive Device (Bed Mobility)  bed rails;head of bed elevated  -LM      Recorded by [LM] Manuela Gomez, PT 01/17/19 1516      Row Name 01/17/19 1336             Transfer Assessment/Treatment    Comment (Transfers)  Unable to assess.  -LM      Recorded by [LM] Manuela Gomez, PT 01/17/19 1516      Row Name 01/17/19 1336             Gait/Stairs Assessment/Training    Comment (Gait/Stairs)  Unable to assess.  -LM      Recorded by [LM] Manuela Gomez, PT 01/17/19 1516      Row Name 01/17/19 1340             Grooming Assessment/Training    [place] Level (Grooming)  wash face, hands;moderate assist (50% patient effort)  (Pended)   -SD      Recorded by [SD] Hannah Santos, OT 01/17/19 1518      Row Name 01/17/19 1340              Motor Skills Assessment/Interventions    Additional Documentation  Therapeutic Exercise (Group)  (Pended)   -SD      Recorded by [SD] Hannah Santos, OT 01/17/19 1518      Row Name 01/17/19 1336             Therapeutic Exercise    Core Strength (Therapeutic Exercise)  other (see comments) Longsit forward reaches;Trunk rotation  -LM      Exercise Type (Therapeutic Exercise)  AAROM (active assistive range of motion)  -LM      Sets/Reps (Therapeutic Exercise)  1 x 10 reps  -LM      Recorded by [LM] Manuela Gomez, PT 01/17/19 1516      Row Name 01/17/19 1336             Positioning and Restraints    Pre-Treatment Position  in bed  -LM      Post Treatment Position  bed  -LM      In Bed  fowlers;call light within reach;encouraged to call for assist  -LM      Recorded by [LM] Manuela Gomez, PT 01/17/19 1516      Row Name 01/17/19 1336             Pain Scale: Numbers Pre/Post-Treatment    Pain Scale: Numbers, Pretreatment  0/10 - no pain  -LM      Pain Scale: Numbers, Post-Treatment  0/10 - no pain  -LM      Recorded by [LM] Manuela Gomez, PT 01/17/19 1516      Row Name                Wound 01/10/19 1925 Right foot    Wound - Properties Group Date first assessed: 01/10/19 [CH] Time first assessed: 1925 [CH] Side: Right [CH] Location: foot [CH] Stage, Pressure Injury: deep tissue injury;unstageable [CH] Additional Comments: right heel. [CH2] Recorded by:  [CH] Jacqueline Munroe RN 01/11/19 0215 [CH2] Jacqueline Munroe RN 01/11/19 0225    Row Name                Wound 01/10/19 1925 Right lower;lateral leg abrasion    Wound - Properties Group Date first assessed: 01/10/19 [CH] Time first assessed: 1925 [CH] Side: Right [CH] Orientation: lower;lateral [CH] Location: leg [CH] Type: abrasion [CH] Stage, Pressure Injury: Stage 1 [CH] Recorded by:  [CH] Jacqueline Munroe RN 01/11/19 0217    Row Name                Wound 01/10/19 1925 Left anterior;lower;proximal leg unspecified    Wound - Properties Group Date first assessed:  01/10/19 [CH] Time first assessed: 1925 [CH] Side: Left [CH] Orientation: anterior;lower;proximal [CH] Location: leg [CH] Type: unspecified [CH] Recorded by:  [CH] Jacqueline Munroe RN 01/11/19 0221    Row Name                Wound 01/10/19 1925 Left gluteal abrasion    Wound - Properties Group Date first assessed: 01/10/19 [CH] Time first assessed: 1925 [CH] Present On Admission : yes;picture taken [CH] Side: Left [CH] Location: gluteal [CH] Type: abrasion [CH] Recorded by:  [CH] Jacqueline Munroe RN 01/11/19 0224    Row Name                Wound 01/10/19 1925 Right posterior thigh abrasion    Wound - Properties Group Date first assessed: 01/10/19 [CH] Time first assessed: 1925 [CH] Present On Admission : yes;picture taken [CH] Side: Right [CH] Orientation: posterior [CH] Location: thigh [CH] Type: abrasion [CH] Recorded by:  [CH] Jacqueline Munroe RN 01/11/19 0230    Row Name                Wound 01/10/19 1925    Wound - Properties Group Date first assessed: 01/10/19 [CH] Time first assessed: 1925 [CH] Present On Admission : yes;picture taken [CH] Recorded by:  [CH] Jacqueline Munroe RN 01/11/19 0302    Row Name                Wound 01/10/19 1925 abdomen    Wound - Properties Group Date first assessed: 01/10/19 [CH] Time first assessed: 1925 [CH] Present On Admission : yes;picture taken [CH] Location: abdomen [CH] Recorded by:  [CH] Jacqueline Munroe RN 01/11/19 0323    Row Name                [REMOVED] Wound 01/14/19 1400 Right foot incision    Wound - Properties Group Date first assessed: 01/14/19 [ADRIANA] Time first assessed: 1400 [ADRIANA] Side: Right [ADRIANA] Location: foot [ADRIANA] Type: incision [ADRIANA] Additional Comments: chartex x2; removed one  [BA] Resolution Date: 01/17/19 [BA] Resolution Time: 1031 [BA] Recorded by:  [BA] Kayley Mcnair RN 01/17/19 1031 [ADRIANA] Indio Spain RN 01/14/19 1400    Row Name 01/17/19 1336             Outcome Summary/Treatment Plan (PT)    Daily Summary of Progress (PT)  progress towards  functional goals is fair  -LM      Plan for Continued Treatment (PT)  Cont PT per POC to goals.  -LM      Anticipated Discharge Disposition (PT)  inpatient rehabilitation facility  -LM      Recorded by [LM] Manuela Gomez, PT 01/17/19 9920        User Key  (r) = Recorded By, (t) = Taken By, (c) = Cosigned By    Initials Name Effective Dates Discipline    CH Jacqueline Munroe, RN 11/07/16 -  Nurse    Manuela Acevedo, PT 04/03/18 -  PT    Kayley Duran RN 10/26/16 -  Nurse    Hannah Williamson, OT 03/07/18 -  OT    Indio Downing RN 11/14/18 -  Nurse          Wound 01/10/19 1925 Right foot (Active)   Dressing Appearance dry;intact 1/17/2019 12:00 PM   Drainage Amount none 1/17/2019 12:00 PM       Wound 01/10/19 1925 Right lower;lateral leg abrasion (Active)   Dressing Appearance dry;intact 1/16/2019  4:00 PM   Base dressing in place, unable to visualize 1/17/2019 12:00 PM   Drainage Characteristics/Odor bleeding controlled 1/17/2019 10:00 AM   Drainage Amount none 1/17/2019 12:00 PM   Care, Wound cleansed with 1/17/2019 10:00 AM   Dressing Care, Wound silicone 1/17/2019 10:00 AM       Wound 01/10/19 1925 Left anterior;lower;proximal leg unspecified (Active)   Drainage Amount none 1/17/2019 12:00 PM   Dressing Care, Wound other (see comments) 1/17/2019  8:00 AM       Wound 01/10/19 1925 Left gluteal abrasion (Active)   Dressing Appearance intact 1/17/2019 12:00 PM   Base dressing in place, unable to visualize 1/16/2019  4:00 PM   Periwound pink 1/17/2019 10:00 AM   Periwound Temperature warm 1/17/2019 10:00 AM   Periwound Skin Turgor soft 1/17/2019 10:00 AM   Care, Wound cleansed with 1/17/2019 10:00 AM   Dressing Care, Wound silicone 1/17/2019 10:00 AM       Wound 01/10/19 1925 Right posterior thigh abrasion (Active)   Dressing Appearance intact 1/17/2019 12:00 PM   Base dressing in place, unable to visualize 1/16/2019  4:00 PM   Yellow (%), Wound Tissue Color 100 1/17/2019 10:00 AM   Wound Length (cm)  2 cm 1/17/2019 10:00 AM   Wound Width (cm) 2 cm 1/17/2019 10:00 AM   Wound Depth (cm) 0.1 cm 1/17/2019 10:00 AM   Drainage Characteristics/Odor serosanguineous 1/17/2019 10:00 AM   Drainage Amount none 1/17/2019 12:00 PM   Care, Wound cleansed with 1/17/2019 10:00 AM   Dressing Care, Wound silicone 1/17/2019 10:00 AM   Periwound Care, Wound barrier ointment applied 1/17/2019 10:00 AM       Wound 01/10/19 1925 (Active)   Drainage Amount none 1/17/2019 12:00 PM       Wound 01/10/19 1925 abdomen (Active)   Dressing Appearance open to air 1/17/2019 12:00 PM   Base scab;other (see comments) 1/16/2019  4:00 PM   Drainage Amount none 1/17/2019 12:00 PM       Rehab Goal Summary     Row Name 01/17/19 1336 01/17/19 0905          Bed Mobility Goal 1 (PT)    Progress/Outcomes (Bed Mobility Goal 1, PT)  goal ongoing  -LM  --        Transfer Goal 1 (PT)    Progress/Outcome (Transfer Goal 1, PT)  goal ongoing  -LM  --        Patient Education Goal (PT)    Progress/Outcome (Patient Education Goal, PT)  goal ongoing  -LM  --        Swallow Goals (SLP)    Oral Nutrition/Hydration Goal Selection (SLP)  --  oral nutrition/hydration, SLP goal 1  -ES     Labial Strengthening Goal Selection (SLP)  --  labial strengthening, SLP goal 1  -ES     Lingual Strengthening Goal Selection (SLP)  --  lingual strengthening, SLP goal 1  -ES     Pharyngeal Strengthening Exercise Goal Selection (SLP)  --  pharyngeal strengthening exercise, SLP goal 1  -ES     Additional Documentation  --  pharyngeal strengthening exercise goal selection (SLP)  -ES        Oral Nutrition/Hydration Goal 1 (SLP)    Oral Nutrition/Hydration Goal 1, SLP  --  Consume mech soft diet texture w/ thin liquids w/ no s/s aspiration  -ES     Time Frame (Oral Nutrition/Hydration Goal 1, SLP)  --  by discharge  -ES     Progress/Outcomes (Oral Nutrition/Hydration Goal 1, SLP)  --  goal revised this date;goal ongoing diet upgraded to puree w/ NTL  -ES        Labial Strengthening Goal 1  (SLP)    Activity (Labial Strengthening Goal 1, SLP)  --  increase labial tone  -ES     Increase Labial Tone  --  labial resistance exercises;swallow trials  -ES     [place]/Accuracy (Labial Strengthening Goal 1, SLP)  --  independently (over 90% accuracy)  -ES     Time Frame (Labial Strengthening Goal 1, SLP)  --  by discharge  -ES     Progress/Outcomes (Labial Strengthening Goal 1, SLP)  --  goal ongoing  -ES        Lingual Strengthening Goal 1 (SLP)    Activity (Lingual Strengthening Goal 1, SLP)  --  increase lingual tone/sensation/control/coordination/movement;increase tongue back strength  -ES     Increase Lingual Tone/Sensation/Control/Coordination/Movement  --  lingual movement exercises;swallow trials;lingual resistance exercises  -ES     Increase Tongue Back Strength  --  lingual movement exercises;swallow trials;lingual resistance exercises  -ES     [place]/Accuracy (Lingual Strengthening Goal 1, SLP)  --  with minimal cues (75-90% accuracy)  -ES     Time Frame (Lingual Strengthening Goal 1, SLP)  --  by discharge  -ES     Progress/Outcomes (Lingual Strengthening Goal 1, SLP)  --  goal ongoing  -ES        Pharyngeal Strengthening Exercise Goal 1 (SLP)    Activity (Pharyngeal Strengthening Goal 1, SLP)  --  increase timing thin liquid trials  -ES     Increase Timing  --  gustatory stimulation (sour/cold);hard effortful swallow  -ES     [place]/Accuracy (Pharyngeal Strengthening Goal 1, SLP)  --  with minimal cues (75-90% accuracy)  -ES     Time Frame (Pharyngeal Strengthening Goal 1, SLP)  --  by discharge  -ES     Progress/Outcomes (Pharyngeal Strengthening Goal 1, SLP)  --  goal ongoing  -ES       User Key  (r) = Recorded By, (t) = Taken By, (c) = Cosigned By    Initials Name Provider Type Discipline    Manuela Acevedo, PT Physical Therapist PT    ES Jesika Braswell, MS CCC-SLP Speech and Language Pathologist SLP          Physical Therapy Education     Title: PT OT SLP Therapies (In  Progress)     Topic: Physical Therapy (Done)     Point: Mobility training (Done)     Learning Progress Summary           Patient Acceptance, E,TB, VU by LM at 1/17/2019  3:16 PM    Comment:  Ther ex for strengthening.    Acceptance, E, NR by EB at 1/17/2019 10:30 AM    Comment:  pt alert and Dr Reyes at bedside and discussed plan of care with patient.  No family at bedside.    Acceptance, E,TB, VU by LM at 1/16/2019 11:45 AM    Comment:  Purpose of PT/POC.                   Point: Home exercise program (Done)     Learning Progress Summary           Patient Acceptance, E,TB, VU by LM at 1/17/2019  3:16 PM    Comment:  Ther ex for strengthening.    Acceptance, E, NR by EB at 1/17/2019 10:30 AM    Comment:  pt alert and Dr Reyes at bedside and discussed plan of care with patient.  No family at bedside.    Acceptance, E,TB, VU by LM at 1/16/2019 11:45 AM    Comment:  Purpose of PT/POC.                   Point: Precautions (Done)     Learning Progress Summary           Patient Acceptance, E,TB, VU by LM at 1/17/2019  3:16 PM    Comment:  Ther ex for strengthening.    Acceptance, E, NR by EB at 1/17/2019 10:30 AM    Comment:  pt alert and Dr Reyes at bedside and discussed plan of care with patient.  No family at bedside.    Acceptance, E,TB, VU by LM at 1/16/2019 11:45 AM    Comment:  Purpose of PT/POC.                               User Key     Initials Effective Dates Name Provider Type Discipline     11/07/16 -  Viv Valente RN Registered Nurse Nurse     04/03/18 -  Manuela Gomez, PT Physical Therapist PT                PT Recommendation and Plan  Anticipated Discharge Disposition (PT): inpatient rehabilitation facility  Planned Therapy Interventions (PT Eval): balance training, bed mobility training, gait training, home exercise program, patient/family education, strengthening, transfer training  Therapy Frequency (PT Clinical Impression): daily  Outcome Summary/Treatment Plan (PT)  Daily Summary of Progress (PT):  progress towards functional goals is fair  Plan for Continued Treatment (PT): Cont PT per POC to goals.  Anticipated Discharge Disposition (PT): inpatient rehabilitation facility  Plan of Care Reviewed With: patient  Progress: improving  Outcome Summary: PT tx completed. Pt more alert and interactive today but with nausea/vomiting. Performs trunk strengthening exercises in bed as indicated on care map. Cont to goals.  Outcome Measures     Row Name 01/17/19 1336 01/16/19 1111 01/16/19 1109       How much help from another person do you currently need...    Turning from your back to your side while in flat bed without using bedrails?  1  -LM  --  1  -LM    Moving from lying on back to sitting on the side of a flat bed without bedrails?  1  -LM  --  1  -LM    Moving to and from a bed to a chair (including a wheelchair)?  1  -LM  --  1  -LM    Standing up from a chair using your arms (e.g., wheelchair, bedside chair)?  1  -LM  --  1  -LM    Climbing 3-5 steps with a railing?  1  -LM  --  1  -LM    To walk in hospital room?  1  -LM  --  1  -LM    AM-PAC 6 Clicks Score  6  -LM  --  6  -LM       How much help from another is currently needed...    Putting on and taking off regular lower body clothing?  --  1  -SD  --    Bathing (including washing, rinsing, and drying)  --  1  -SD  --    Toileting (which includes using toilet bed pan or urinal)  --  1  -SD  --    Putting on and taking off regular upper body clothing  --  1  -SD  --    Taking care of personal grooming (such as brushing teeth)  --  1  -SD  --    Eating meals  --  1  -SD  --    Score  --  6  -SD  --       Functional Assessment    Outcome Measure Options  AM-PAC 6 Clicks Basic Mobility (PT)  -LM  AM-PAC 6 Clicks Daily Activity (OT)  -SD  AM-PAC 6 Clicks Basic Mobility (PT)  -LM      User Key  (r) = Recorded By, (t) = Taken By, (c) = Cosigned By    Initials Name Provider Type    Manuela Acevedo, PT Physical Therapist    SD Hannah Santos, OT Occupational  Therapist         Time Calculation:   PT Charges     Row Name 01/17/19 1518             Time Calculation    Start Time  1336  -LM      PT Received On  01/17/19  -LM         Time Calculation- PT    Total Timed Code Minutes- PT  21 minute(s)  -LM        User Key  (r) = Recorded By, (t) = Taken By, (c) = Cosigned By    Initials Name Provider Type    LM Manuela Gomez, PT Physical Therapist        Therapy Suggested Charges     Code   Minutes Charges    None           Therapy Charges for Today     Code Description Service Date Service Provider Modifiers Qty    50010669034 HC PT EVAL MOD COMPLEXITY 4 1/16/2019 Manuela Gomez, PT GP 1    23286482473 HC PT THERAPEUTIC ACT EA 15 MIN 1/17/2019 Manuela Gomez, PT GP 1          PT G-Codes  Outcome Measure Options: AM-PAC 6 Clicks Basic Mobility (PT)  AM-PAC 6 Clicks Score: 6  Score: 6    Manuela Gomez, PT  1/17/2019

## 2019-01-17 NOTE — PLAN OF CARE
Problem: Patient Care Overview  Goal: Plan of Care Review  Outcome: Ongoing (interventions implemented as appropriate)   01/17/19 1521   Coping/Psychosocial   Plan of Care Reviewed With patient   Plan of Care Review   Progress improving   OTHER   Outcome Summary OT tx completed. Patient presented improved performance with grooming tasks; trunk strengthening in bed and UB AAROM. Encouraged patient to do more and she was too nauseated.

## 2019-01-17 NOTE — PROGRESS NOTES
"Nephrology Progress Note.    LOS: 7 days    Patient Care Team:  Rosa Zamora MD as PCP - General (Internal Medicine)  Geremias Shepherd MD as Consulting Physician (General Surgery)    Chief Complaint:  No chief complaint on file.      Subjective     Follow up for FREDDY and related issues.    Interval History:     Patient Complaints: none    Patient seen and examined this morning.  Events from last night noted.  She is doing much better not more alert and interactive.  She is able to communicate keep a conversation, she's been getting to feeding then having persistent diarrhea and multiple bouts of vomiting as well.    Review of Systems:    Patient is unresponsive no meaningful review of system is possible.      Objective     Vital Signs  /67 (BP Location: Right arm, Patient Position: Lying)   Pulse 64   Temp 97.3 °F (36.3 °C) (Oral)   Resp 18   Ht 165.1 cm (65\")   Wt (!) 147 kg (323 lb 6.4 oz)   SpO2 94%   BMI 53.82 kg/m²       No intake/output data recorded.    Intake/Output Summary (Last 24 hours) at 1/17/2019 0856  Last data filed at 1/17/2019 0550  Gross per 24 hour   Intake 1353 ml   Output 5610 ml   Net -4257 ml       Physical Exam:    General Appearance:  no acute distress,   HEENT: Oral mucosa dry, extra occular movements intact. Sclera clear.  Skin: Warm and dry  Neck: supple, no JVD, trachea midline  Lungs:Chest shape is normal. Breath sounds heard bilaterally equally. No crackles, No wheezing.   Heart: regular rate and rhythm. normal S1 and S2, no S3, no rub, peripheral pulses weak but palpable.  Abdomen: Obese, soft, non-tender,  present bowel sounds to auscultation  : no palpable bladder.  Extremities: 1-2+ edema, no cyanosis or clubbing.   Neuro: Unable to evaluate     Results Review:    Results from last 7 days   Lab Units 01/17/19  0428 01/16/19  0537 01/15/19  0416 01/14/19  0414 01/13/19  0423  01/11/19  0419   SODIUM mmol/L 141 138 142 138 138   < > 135*   POTASSIUM mmol/L 3.5 3.9 " 4.2 4.2 4.8   < > 5.7*   CHLORIDE mmol/L 109* 109* 109* 109* 108*   < > 106   CO2 mmol/L 22.0* 25.0* 19.0* 19.0* 18.0*   < > 19.0*   BUN mg/dL 61* 77* 98* 82* 73*   < > 50*   CREATININE mg/dL 2.10* 2.70* 3.70* 4.00* 3.80*   < > 3.50*   CALCIUM mg/dL 8.4 7.8* 7.7* 7.9* 8.1*   < > 8.0*   BILIRUBIN mg/dL  --   --   --  0.4 0.4  --  0.4   ALK PHOS U/L  --   --   --  272* 323*  --  305*   ALT (SGPT) U/L  --   --   --  27 34  --  37   AST (SGOT) U/L  --   --   --  16 21  --  28   GLUCOSE mg/dL 208* 253* 220* 181* 202*   < > 139*    < > = values in this interval not displayed.       Estimated Creatinine Clearance: 41.8 mL/min (A) (by C-G formula based on SCr of 2.1 mg/dL (H)).    Results from last 7 days   Lab Units 01/16/19  0537 01/12/19  0431 01/11/19  0419 01/10/19  2110   MAGNESIUM mg/dL 2.5*  --  2.6* 2.5*   PHOSPHORUS mg/dL  --  7.9* 7.4* 6.8*       Results from last 7 days   Lab Units 01/12/19  0431   URIC ACID mg/dL 4.4       Results from last 7 days   Lab Units 01/17/19  0428 01/16/19  0537 01/15/19  0418 01/14/19  0414 01/13/19  0423   WBC 10*3/mm3 11.00* 13.99* 19.82* 18.96* 22.61*   HEMOGLOBIN g/dL 8.2* 8.4* 8.1* 8.2* 8.6*   PLATELETS 10*3/mm3 108* 79* 83* 102* 131       Results from last 7 days   Lab Units 01/13/19  0901 01/10/19  2112   INR  1.41* 1.57*         Imaging Results (last 24 hours)     ** No results found for the last 24 hours. **          ertapenem 500 mg Intravenous Q24H   famotidine 20 mg Intravenous Daily   hydrALAZINE 25 mg Nasogastric Q8H   hydrocortisone sodium succinate 50 mg Intravenous Q12H   insulin detemir 30 Units Subcutaneous QAM   insulin regular 0-7 Units Subcutaneous Q6H   lactobacillus acidophilus 1 capsule Nasogastric Daily   levothyroxine 100 mcg Nasogastric Q AM   sodium chloride 3 mL Intravenous Q12H       sodium chloride 10 mL/hr Last Rate: 10 mL/hr (01/15/19 0902)       Medication Review:   Current Facility-Administered Medications   Medication Dose Route Frequency Provider  Last Rate Last Dose   • acetaminophen (TYLENOL) tablet 650 mg  650 mg Oral Q4H PRN Milad Leone MD, GILA       • CHAPSTICK 1 each  1 each Apply externally Q1H PRN Samson Reyes MD   1 each at 01/16/19 1155   • dextrose (D50W) 25 g/ 50mL Intravenous Solution 25 g  25 g Intravenous Q15 Min PRN Milad Leone MD, FASN       • dextrose (GLUTOSE) oral gel 1 tube  1 tube Oral Q15 Min PRN Milad Leone MD, SHANIN       • ertapenem (INVanz) 500 mg in Sodium chloride 0.9 % 50 mL IVPB  500 mg Intravenous Q24H Samson Reyes  mL/hr at 01/16/19 1640 500 mg at 01/16/19 1640   • famotidine (PEPCID) injection 20 mg  20 mg Intravenous Daily Liz Gregg MD   20 mg at 01/16/19 0835   • glucagon (human recombinant) (GLUCAGEN DIAGNOSTIC) injection 1 mg  1 mg Subcutaneous PRN Milad Leone MD, SHANIN       • glycerin 35 % liquid 35% 2 spray  2 spray Mouth/Throat Q2H PRN Maribel Wheeler DO   2 spray at 01/16/19 0857   • heparin (porcine) injection 2,400 Units  2,400 Units Intracatheter PRN Milad Leone MD, FASN   2,400 Units at 01/15/19 1502   • hydrALAZINE (APRESOLINE) injection 10 mg  10 mg Intravenous Q6H PRN Liz Gregg MD   10 mg at 01/16/19 0447   • hydrALAZINE (APRESOLINE) tablet 25 mg  25 mg Nasogastric Q8H Samson Reyes MD   25 mg at 01/17/19 0521   • hydrocortisone sodium succinate (Solu-CORTEF) injection 50 mg  50 mg Intravenous Q12H Liz Gregg MD   50 mg at 01/17/19 0034   • insulin detemir (LEVEMIR) injection 30 Units  30 Units Subcutaneous QAM Samson Reyes MD   30 Units at 01/17/19 0630   • insulin regular (humuLIN R,novoLIN R) injection 0-7 Units  0-7 Units Subcutaneous Q6H Liz Gregg MD   3 Units at 01/17/19 0630   • lactobacillus acidophilus (RISAQUAD) capsule 1 capsule  1 capsule Nasogastric Daily Samson Reyes MD   1 capsule at 01/16/19 0813   • levothyroxine (SYNTHROID, LEVOTHROID) tablet 100 mcg  100 mcg Nasogastric Q AM Samson Reyes MD   100 mcg at 01/17/19 3390    • LORazepam (ATIVAN) injection 0.5 mg  0.5 mg Intravenous Q6H PRN Samson Reyes MD       • Morphine sulfate (PF) injection 2 mg  2 mg Intravenous Q4H PRN Milad Leone MD, FASN        And   • naloxone (NARCAN) injection 0.4 mg  0.4 mg Intravenous Q5 Min PRN Milad Leone MD, FASN       • ondansetron (ZOFRAN) tablet 4 mg  4 mg Oral Q6H PRN Milad Leone MD, FASN        Or   • ondansetron ODT (ZOFRAN-ODT) disintegrating tablet 4 mg  4 mg Oral Q6H PRN Milad Leone MD, FASN        Or   • ondansetron (ZOFRAN) injection 4 mg  4 mg Intravenous Q6H PRN Milad Leone MD, FASN       • sodium chloride 0.9 % flush 1-10 mL  1-10 mL Intravenous PRN Milad Leone MD, FASN       • sodium chloride 0.9 % flush 3 mL  3 mL Intravenous Q12H Milad Leone MD, FASN   3 mL at 01/16/19 2106   • Sodium chloride 0.9 % infusion  10 mL/hr Intravenous Continuous Milad Leone MD, FASN 10 mL/hr at 01/15/19 0902 10 mL/hr at 01/15/19 0902   • zinc oxide 13 % cream   Topical BID PRN Milad Leone MD, FASN   1 application at 01/12/19 0414       Assessment/Plan         1.   Acute on chronic renal failure (CMS/HCC): It is likely secondary to hypotensive episodes as well as the use of Bactrim.  There is possibility that she may be taking nonsteroidals and other hemodynamic abnormality is causing the current problem.  There is no bloody around at this time who can help make the decisions or tell us what was done at home.  Finally I have came to know about that she has a blind sister both of them try to work together to take care of each other.  Her sister is on dialysis and is my patient.  2.   Cellulitis of both lower extremities: Continue with the IV antibiotics.  She likely has an abscess on the foot that needs to be addressed  3.   Altered mental status: There is some improvement in her mental status.  4.   Anemia: Transfused as needed  5.   Bilateral edema of lower extremity:  she needs a central line placed the temporary  dialysis catheter and use it as a central line and likely will need dialysis in the morning.  6.   GERD (gastroesophageal reflux disease)  7.   Hyperkalemia:  it is resolved at this point  8.   Hypertension: Under fair control  9.   Hypothermia  10.   Hypothyroidism  11.   Type 2 diabetes mellitus with complication (CMS/[facility])  12.   Vitamin B12 deficiency    Plan:    · I'll hold off dialysis today and see how she'll respond to a day without dialysis.  Continue reassess on day-to-day basis.  · Blood pressure is fairly stable with the hydralazine can be continued.  · Clinically starting to improve.  · Details were also discussed with the hospitalist service.    · Surveillance labs.  · Further recommendations will depend on clinical course of the patient during the current hospitalization.    · I also discussed the details with the nursing staff.  · Rest as ordered.    Milad Leone MD, FASN  01/17/19  8:56 AM    Dictated utilizing Dragon dictation.

## 2019-01-17 NOTE — THERAPY TREATMENT NOTE
Acute Care - Occupational Therapy Treatment Note   [facility]     Patient Name: Millicent Mckeon  : 1960  MRN: [id number]  Today's Date: 2019  Onset of Illness/Injury or Date of Surgery: 01/10/19  Date of Referral to OT: 19  Referring Physician: Dr. Gregg    Admit Date: 1/10/2019       ICD-10-CM ICD-9-CM   1. Altered mental status, unspecified altered mental status type R41.82 780.97   2. Cellulitis of both lower extremities L03.115 682.6    L03.116    3. Impaired functional mobility, balance, gait, and endurance Z74.09 V49.89   4. Impaired mobility and ADLs Z74.09 799.89   5. Dysphagia, oropharyngeal phase R13.12 787.22     Patient Active Problem List   Diagnosis   • Altered mental status   • Anemia   • Bilateral edema of lower extremity   • Cellulitis of lower extremity   • Acute on chronic renal failure (CMS/HCC)   • GERD (gastroesophageal reflux disease)   • Hyperkalemia   • Essential hypertension   • Hypothermia   • Hypothyroidism   • Type 2 diabetes mellitus with complication (CMS/HCC)   • Vitamin B12 deficiency   • Cellulitis of both lower extremities   • Acute metabolic encephalopathy     Past Medical History:   Diagnosis Date   • Diabetes mellitus (CMS/HCC)    • Disease of thyroid gland    • GERD (gastroesophageal reflux disease)    • Hypertension      Past Surgical History:   Procedure Laterality Date   • EYE SURGERY     • INCISION AND DRAINAGE LEG Right 2019    Procedure: Right heel incision and drainage with graft application;  Surgeon: Ar Rueda DPM;  Location: [facility];  Service: Podiatry   • LEG SURGERY         Therapy Treatment    Rehabilitation Treatment Summary     Row Name 19 1340 19 1336          Treatment Time/Intention    Discipline  occupational therapist  -SD  physical therapist  -LM     Document Type  therapy note (daily note)  -SD  therapy note (daily note)  -LM     Subjective Information  complains of;fatigue  -SD  complains  of;fatigue;nausea/vomiting  -LM     Mode of Treatment  occupational therapy  -SD  physical therapy  -LM     Patient/Family Observations  Supine in bed, IV intact, on room air  -SD  Pt received in vilchis's position in bed.  -LM     Care Plan Review  care plan/treatment goals reviewed  -SD  care plan/treatment goals reviewed;patient/other agree to care plan  -LM     Therapy Frequency (PT Clinical Impression)  --  daily  -LM     Patient Effort  adequate  -SD  fair  -LM     Existing Precautions/Restrictions  fall  -SD  fall;oxygen therapy device and L/min  -LM     Patient Response to Treatment  Pt experienced vomiting after drinking sip of nectar thick tea. RN said to keep NPO until she could contact Dr.   -SD  Fatigues quickly.  -LM     Recorded by [SD] Hannah Santos, OT 01/17/19 1520 [LM] Manuela Gomez, PT 01/17/19 1516     Row Name 01/17/19 1340             Vital Signs    Pre SpO2 (%)  97  -SD      O2 Delivery Pre Treatment  room air  -SD      Intra SpO2 (%)  96  -SD      O2 Delivery Intra Treatment  room air  -SD      Post SpO2 (%)  97  -SD      O2 Delivery Post Treatment  room air  -SD      Recorded by [SD] Hannah Santos, OT 01/17/19 1520      Row Name 01/17/19 1336             Bed Mobility Assessment/Treatment    Bed Mobility Assessment/Treatment  other (see comments) Longsit forward reaches;Trunk rotation  -LM      Bed Mobility, Safety Issues  decreased use of arms for pushing/pulling;decreased use of legs for bridging/pushing;impaired trunk control for bed mobility  -LM      Assistive Device (Bed Mobility)  bed rails;head of bed elevated  -LM      Recorded by [LM] Manuela Gomez, PT 01/17/19 1516      Row Name 01/17/19 1336             Transfer Assessment/Treatment    Comment (Transfers)  Unable to assess.  -LM      Recorded by [LM] Manuela Gomez, PT 01/17/19 1516      Row Name 01/17/19 1336             Gait/Stairs Assessment/Training    Comment (Gait/Stairs)  Unable to assess.  -LM      Recorded by [LM]  Manuela Gomez, PT 01/17/19 1516      Row Name 01/17/19 1340             Grooming Assessment/Training    [place] Level (Grooming)  wash face, hands;moderate assist (50% patient effort)  -SD      Recorded by [SD] Hannah Santos, OT 01/17/19 1520      Row Name 01/17/19 1340             Motor Skills Assessment/Interventions    Additional Documentation  Therapeutic Exercise (Group)  -SD      Recorded by [SD] Hannah Santos, OT 01/17/19 1520      Row Name 01/17/19 1340 01/17/19 1336          Therapeutic Exercise    Upper Extremity Range of Motion (Therapeutic Exercise)  shoulder flexion/extension, bilateral;shoulder abduction/adduction, bilateral;shoulder horizontal abduction/adduction, bilateral;elbow flexion/extension, bilateral  -SD  --     Core Strength (Therapeutic Exercise)  -- TRUNK FLEXION/ROTATION X 10  -SD  other (see comments) Longsit forward reaches;Trunk rotation  -LM     Exercise Type (Therapeutic Exercise)  AAROM (active assistive range of motion)  -SD  AAROM (active assistive range of motion)  -LM     Position (Therapeutic Exercise)  supine  -SD  --     Sets/Reps (Therapeutic Exercise)  1 x 15  -SD  1 x 10 reps  -LM     Expected Outcome (Therapeutic Exercise)  improve functional tolerance, self-care activity  -SD  --     Recorded by [SD] Hannah Santos, OT 01/17/19 1520 [LM] Manuela Gomez, PT 01/17/19 1516     Row Name 01/17/19 1340 01/17/19 1336          Positioning and Restraints    Pre-Treatment Position  in bed  -SD  in bed  -LM     Post Treatment Position  bed  -SD  bed  -LM     In Bed  fowlers;call light within reach;encouraged to call for assist  -SD  fowlers;call light within reach;encouraged to call for assist  -LM     Recorded by [SD] Hannah Santos, OT 01/17/19 1520 [LM] Manuela Gomez, PT 01/17/19 1516     Row Name 01/17/19 1340 01/17/19 1336          Pain Scale: Numbers Pre/Post-Treatment    Pain Scale: Numbers, Pretreatment  0/10 - no pain  -SD  0/10 - no pain  -LM     Pain Scale:  Numbers, Post-Treatment  0/10 - no pain  -SD  0/10 - no pain  -LM     Recorded by [SD] Hannah Santos, OT 01/17/19 1520 [LM] Manuela Gomez, PT 01/17/19 1516     Row Name                Wound 01/10/19 1925 Right foot    Wound - Properties Group Date first assessed: 01/10/19 [CH] Time first assessed: 1925 [CH] Side: Right [CH] Location: foot [CH] Stage, Pressure Injury: deep tissue injury;unstageable [CH] Additional Comments: right heel. [CH2] Recorded by:  [CH] Jacqueline Munroe RN 01/11/19 0215 [CH2] Jacqueline Munroe RN 01/11/19 0225    Row Name                Wound 01/10/19 1925 Right lower;lateral leg abrasion    Wound - Properties Group Date first assessed: 01/10/19 [CH] Time first assessed: 1925 [CH] Side: Right [CH] Orientation: lower;lateral [CH] Location: leg [CH] Type: abrasion [CH] Stage, Pressure Injury: Stage 1 [CH] Recorded by:  [CH] Jacqueline Munroe RN 01/11/19 0217    Row Name                Wound 01/10/19 1925 Left anterior;lower;proximal leg unspecified    Wound - Properties Group Date first assessed: 01/10/19 [CH] Time first assessed: 1925 [CH] Side: Left [CH] Orientation: anterior;lower;proximal [CH] Location: leg [CH] Type: unspecified [CH] Recorded by:  [CH] Jacqueline Munroe RN 01/11/19 0221    Row Name                Wound 01/10/19 1925 Left gluteal abrasion    Wound - Properties Group Date first assessed: 01/10/19 [CH] Time first assessed: 1925 [CH] Present On Admission : yes;picture taken [CH] Side: Left [CH] Location: gluteal [CH] Type: abrasion [CH] Recorded by:  [CH] Jacqueline Munroe RN 01/11/19 0224    Row Name                Wound 01/10/19 1925 Right posterior thigh abrasion    Wound - Properties Group Date first assessed: 01/10/19 [CH] Time first assessed: 1925 [CH] Present On Admission : yes;picture taken [CH] Side: Right [CH] Orientation: posterior [CH] Location: thigh [CH] Type: abrasion [CH] Recorded by:  [CH] Jacqueline Munroe RN 01/11/19 0230    Row Name                Wound 01/10/19  1925    Wound - Properties Group Date first assessed: 01/10/19 [CH] Time first assessed: 1925 [CH] Present On Admission : yes;picture taken [CH] Recorded by:  [CH] Jacqueline Munroe RN 01/11/19 0302    Row Name                Wound 01/10/19 1925 abdomen    Wound - Properties Group Date first assessed: 01/10/19 [CH] Time first assessed: 1925 [CH] Present On Admission : yes;picture taken [CH] Location: abdomen [CH] Recorded by:  [CH] Jacqueline Munroe RN 01/11/19 0323    Row Name                [REMOVED] Wound 01/14/19 1400 Right foot incision    Wound - Properties Group Date first assessed: 01/14/19 [ADRIANA] Time first assessed: 1400 [ADRIANA] Side: Right [ADRIANA] Location: foot [ADRIANA] Type: incision [ADRIANA] Additional Comments: chartex x2; removed one  [BA] Resolution Date: 01/17/19 [BA] Resolution Time: 1031 [BA] Recorded by:  [BA] Kayley Mcnair RN 01/17/19 1031 [ADRIANA] Indio Spain RN 01/14/19 1400    Row Name 01/17/19 1340             Coping    Observed Emotional State  calm;cooperative  -SD      Verbalized Emotional State  acceptance  -SD      Recorded by [SD] Hannah Santos OT 01/17/19 1520      Row Name 01/17/19 1340             Plan of Care Review    Plan of Care Reviewed With  patient  -SD      Recorded by [SD] Hannah Santos OT 01/17/19 1520      Row Name 01/17/19 1340             Outcome Summary/Treatment Plan (OT)    Daily Summary of Progress (OT)  progress toward functional goals is gradual  -SD      Anticipated Discharge Disposition (OT)  inpatient rehabilitation facility  -SD      Recorded by [SD] Hannah Santos, OT 01/17/19 1520      Row Name 01/17/19 1336             Outcome Summary/Treatment Plan (PT)    Daily Summary of Progress (PT)  progress towards functional goals is fair  -LM      Plan for Continued Treatment (PT)  Cont PT per POC to goals.  -LM      Anticipated Discharge Disposition (PT)  inpatient rehabilitation facility  -LM      Recorded by [LM] Manuela Gomez, PT 01/17/19 1516        User  Key  (r) = Recorded By, (t) = Taken By, (c) = Cosigned By    Initials Name Effective Dates Discipline    CH Jacqueline Munroe, RN 11/07/16 -  Nurse    Manuela Acevedo, PT 04/03/18 -  PT    Kayley Duran RN 10/26/16 -  Nurse    Hannah Williamson, OT 03/07/18 -  OT    Indio Downing RN 11/14/18 -  Nurse        Wound 01/10/19 1925 Right foot (Active)   Dressing Appearance dry;intact 1/17/2019 12:00 PM   Drainage Amount none 1/17/2019 12:00 PM       Wound 01/10/19 1925 Right lower;lateral leg abrasion (Active)   Dressing Appearance dry;intact 1/16/2019  4:00 PM   Base dressing in place, unable to visualize 1/17/2019 12:00 PM   Drainage Characteristics/Odor bleeding controlled 1/17/2019 10:00 AM   Drainage Amount none 1/17/2019 12:00 PM   Care, Wound cleansed with 1/17/2019 10:00 AM   Dressing Care, Wound silicone 1/17/2019 10:00 AM       Wound 01/10/19 1925 Left anterior;lower;proximal leg unspecified (Active)   Drainage Amount none 1/17/2019 12:00 PM   Dressing Care, Wound other (see comments) 1/17/2019  8:00 AM       Wound 01/10/19 1925 Left gluteal abrasion (Active)   Dressing Appearance intact 1/17/2019 12:00 PM   Base dressing in place, unable to visualize 1/16/2019  4:00 PM   Periwound pink 1/17/2019 10:00 AM   Periwound Temperature warm 1/17/2019 10:00 AM   Periwound Skin Turgor soft 1/17/2019 10:00 AM   Care, Wound cleansed with 1/17/2019 10:00 AM   Dressing Care, Wound silicone 1/17/2019 10:00 AM       Wound 01/10/19 1925 Right posterior thigh abrasion (Active)   Dressing Appearance intact 1/17/2019 12:00 PM   Base dressing in place, unable to visualize 1/16/2019  4:00 PM   Yellow (%), Wound Tissue Color 100 1/17/2019 10:00 AM   Wound Length (cm) 2 cm 1/17/2019 10:00 AM   Wound Width (cm) 2 cm 1/17/2019 10:00 AM   Wound Depth (cm) 0.1 cm 1/17/2019 10:00 AM   Drainage Characteristics/Odor serosanguineous 1/17/2019 10:00 AM   Drainage Amount none 1/17/2019 12:00 PM   Care, Wound cleansed with  1/17/2019 10:00 AM   Dressing Care, Wound silicone 1/17/2019 10:00 AM   Periwound Care, Wound barrier ointment applied 1/17/2019 10:00 AM       Wound 01/10/19 1925 (Active)   Drainage Amount none 1/17/2019 12:00 PM       Wound 01/10/19 1925 abdomen (Active)   Dressing Appearance open to air 1/17/2019 12:00 PM   Base scab;other (see comments) 1/16/2019  4:00 PM   Drainage Amount none 1/17/2019 12:00 PM     Rehab Goal Summary     Row Name 01/17/19 1340 01/17/19 1336 01/17/19 0905       Bed Mobility Goal 1 (PT)    Progress/Outcomes (Bed Mobility Goal 1, PT)  --  goal ongoing  -LM  --       Transfer Goal 1 (PT)    Progress/Outcome (Transfer Goal 1, PT)  --  goal ongoing  -LM  --       Patient Education Goal (PT)    Progress/Outcome (Patient Education Goal, PT)  --  goal ongoing  -LM  --       Bed Mobility Goal 1 (OT)    Progress/Outcomes (Bed Mobility Goal 1, OT)  goal ongoing  -SD  --  --       Grooming Goal 1 (OT)    Progress/Outcome (Grooming Goal 1, OT)  goal met  -SD  --  --       Strength Goal 1 (OT)    Progress/Outcome (Strength Goal 1, OT)  goal partially met  -SD  --  --       Balance Goal 1 (OT)    Progress/Outcomes (Balance Goal 1, OT)  goal ongoing  -SD  --  --       Swallow Goals (SLP)    Oral Nutrition/Hydration Goal Selection (SLP)  --  --  oral nutrition/hydration, SLP goal 1  -ES    Labial Strengthening Goal Selection (SLP)  --  --  labial strengthening, SLP goal 1  -ES    Lingual Strengthening Goal Selection (SLP)  --  --  lingual strengthening, SLP goal 1  -ES    Pharyngeal Strengthening Exercise Goal Selection (SLP)  --  --  pharyngeal strengthening exercise, SLP goal 1  -ES    Additional Documentation  --  --  pharyngeal strengthening exercise goal selection (SLP)  -ES       Oral Nutrition/Hydration Goal 1 (SLP)    Oral Nutrition/Hydration Goal 1, SLP  --  --  Consume mech soft diet texture w/ thin liquids w/ no s/s aspiration  -ES    Time Frame (Oral Nutrition/Hydration Goal 1, SLP)  --  --  by  discharge  -ES    Progress/Outcomes (Oral Nutrition/Hydration Goal 1, SLP)  --  --  goal revised this date;goal ongoing diet upgraded to puree w/ NTL  -ES       Labial Strengthening Goal 1 (SLP)    Activity (Labial Strengthening Goal 1, SLP)  --  --  increase labial tone  -ES    Increase Labial Tone  --  --  labial resistance exercises;swallow trials  -ES    [place]/Accuracy (Labial Strengthening Goal 1, SLP)  --  --  independently (over 90% accuracy)  -ES    Time Frame (Labial Strengthening Goal 1, SLP)  --  --  by discharge  -ES    Progress/Outcomes (Labial Strengthening Goal 1, SLP)  --  --  goal ongoing  -ES       Lingual Strengthening Goal 1 (SLP)    Activity (Lingual Strengthening Goal 1, SLP)  --  --  increase lingual tone/sensation/control/coordination/movement;increase tongue back strength  -ES    Increase Lingual Tone/Sensation/Control/Coordination/Movement  --  --  lingual movement exercises;swallow trials;lingual resistance exercises  -ES    Increase Tongue Back Strength  --  --  lingual movement exercises;swallow trials;lingual resistance exercises  -ES    [place]/Accuracy (Lingual Strengthening Goal 1, SLP)  --  --  with minimal cues (75-90% accuracy)  -ES    Time Frame (Lingual Strengthening Goal 1, SLP)  --  --  by discharge  -ES    Progress/Outcomes (Lingual Strengthening Goal 1, SLP)  --  --  goal ongoing  -ES       Pharyngeal Strengthening Exercise Goal 1 (SLP)    Activity (Pharyngeal Strengthening Goal 1, SLP)  --  --  increase timing thin liquid trials  -ES    Increase Timing  --  --  gustatory stimulation (sour/cold);hard effortful swallow  -ES    [place]/Accuracy (Pharyngeal Strengthening Goal 1, SLP)  --  --  with minimal cues (75-90% accuracy)  -ES    Time Frame (Pharyngeal Strengthening Goal 1, SLP)  --  --  by discharge  -ES    Progress/Outcomes (Pharyngeal Strengthening Goal 1, SLP)  --  --  goal ongoing  -ES      User Key  (r) = Recorded By, (t) = Taken By, (c) = Cosigned  By    Initials Name Provider Type Discipline    Manuela Acevedo, PT Physical Therapist PT    Jesika Garcia, MS CCC-SLP Speech and Language Pathologist SLP    Hannah Williamson, OT Occupational Therapist OT        Occupational Therapy Education     Title: PT OT SLP Therapies (In Progress)     Topic: Occupational Therapy (In Progress)     Point: ADL training (Done)     Description: Instruct learner(s) on proper safety adaptation and remediation techniques during self care or transfers.   Instruct in proper use of assistive devices.    Learning Progress Summary           Patient Acceptance, E,TB, VU by SD at 1/17/2019  3:23 PM    Comment:  Safety and sequencing during grooming tasks to increase independence.    Acceptance, E, NR by EB at 1/17/2019 10:30 AM    Comment:  pt alert and Dr Reyes at bedside and discussed plan of care with patient.  No family at bedside.    Acceptance, E,TB, VU by SD at 1/16/2019  1:20 PM    Comment:  Benefit of OT; OT POC                   Point: Home exercise program (In Progress)     Description: Instruct learner(s) on appropriate technique for monitoring, assisting and/or progressing therapeutic exercises/activities.    Learning Progress Summary           Patient Acceptance, E, NR by EB at 1/17/2019 10:30 AM    Comment:  pt alert and Dr Reyes at bedside and discussed plan of care with patient.  No family at bedside.                   Point: Precautions (In Progress)     Description: Instruct learner(s) on prescribed precautions during self-care and functional transfers.    Learning Progress Summary           Patient Acceptance, E, NR by EB at 1/17/2019 10:30 AM    Comment:  pt alert and Dr Reyes at bedside and discussed plan of care with patient.  No family at bedside.                   Point: Body mechanics (In Progress)     Description: Instruct learner(s) on proper positioning and spine alignment during self-care, functional mobility activities and/or exercises.    Learning  Progress Summary           Patient Acceptance, E, NR by EB at 1/17/2019 10:30 AM    Comment:  pt alert and Dr Reyes at bedside and discussed plan of care with patient.  No family at bedside.                               User Key     Initials Effective Dates Name Provider Type Discipline    EB 11/07/16 -  Viv Valente, RN Registered Nurse Nurse    SD 03/07/18 -  Hannah Santos OT Occupational Therapist OT                OT Recommendation and Plan  Outcome Summary/Treatment Plan (OT)  Daily Summary of Progress (OT): progress toward functional goals is gradual  Anticipated Discharge Disposition (OT): inpatient rehabilitation facility  Planned Therapy Interventions (OT Eval): activity tolerance training, adaptive equipment training, BADL retraining, patient/caregiver education/training, strengthening exercise, transfer/mobility retraining  Therapy Frequency (OT Eval): daily(Monday-Friday)  Daily Summary of Progress (OT): progress toward functional goals is gradual  Plan of Care Review  Plan of Care Reviewed With: patient  Plan of Care Reviewed With: patient  Outcome Summary: OT tx completed. Patient presented improved performance with grooming tasks; trunk strengthening in bed and UB AAROM. Encouraged patient to do more and she was too nauseated.   Outcome Measures     Row Name 01/17/19 1340 01/17/19 1336 01/16/19 1111       How much help from another person do you currently need...    Turning from your back to your side while in flat bed without using bedrails?  --  1  -LM  --    Moving from lying on back to sitting on the side of a flat bed without bedrails?  --  1  -LM  --    Moving to and from a bed to a chair (including a wheelchair)?  --  1  -LM  --    Standing up from a chair using your arms (e.g., wheelchair, bedside chair)?  --  1  -LM  --    Climbing 3-5 steps with a railing?  --  1  -LM  --    To walk in hospital room?  --  1  -LM  --    AM-PAC 6 Clicks Score  --  6  -LM  --       How much help from  another is currently needed...    Putting on and taking off regular lower body clothing?  1  -SD  --  1  -SD    Bathing (including washing, rinsing, and drying)  1  -SD  --  1  -SD    Toileting (which includes using toilet bed pan or urinal)  1  -SD  --  1  -SD    Putting on and taking off regular upper body clothing  2  -SD  --  1  -SD    Taking care of personal grooming (such as brushing teeth)  2  -SD  --  1  -SD    Eating meals  1  -SD  --  1  -SD    Score  8  -SD  --  6  -SD       Functional Assessment    Outcome Measure Options  AM-PAC 6 Clicks Daily Activity (OT)  -SD  AM-PAC 6 Clicks Basic Mobility (PT)  -LM  AM-PAC 6 Clicks Daily Activity (OT)  -SD    Row Name 01/16/19 1109             How much help from another person do you currently need...    Turning from your back to your side while in flat bed without using bedrails?  1  -LM      Moving from lying on back to sitting on the side of a flat bed without bedrails?  1  -LM      Moving to and from a bed to a chair (including a wheelchair)?  1  -LM      Standing up from a chair using your arms (e.g., wheelchair, bedside chair)?  1  -LM      Climbing 3-5 steps with a railing?  1  -LM      To walk in hospital room?  1  -LM      AM-PAC 6 Clicks Score  6  -LM         Functional Assessment    Outcome Measure Options  AM-PAC 6 Clicks Basic Mobility (PT)  -LM        User Key  (r) = Recorded By, (t) = Taken By, (c) = Cosigned By    Initials Name Provider Type    LM Manuela Gomez, MIC Physical Therapist    Hannah Williamson OT Occupational Therapist           Time Calculation:   Time Calculation- OT     Row Name 01/17/19 1524             Time Calculation- OT    OT Start Time  1340  -SD      Total Timed Code Minutes- OT  15 minute(s)  -SD      OT Received On  01/17/19  -SD      OT Goal Re-Cert Due Date  01/26/19  -SD         Timed Charges    58327 - OT Therapeutic Activity Minutes  10  -SD      87169 - OT Self Care/Mgmt Minutes  5  -SD        User Key  (r) = Recorded  By, (t) = Taken By, (c) = Cosigned By    Initials Name Provider Type    Hannah Williamson OT Occupational Therapist           Therapy Suggested Charges     Code   Minutes Charges    74582 (CPT®) Hc Ot Neuromusc Re Education Ea 15 Min      11707 (CPT®) Hc Ot Ther Proc Ea 15 Min      34146 (CPT®) Hc Ot Therapeutic Act Ea 15 Min 10 1    05875 (CPT®) Hc Ot Manual Therapy Ea 15 Min      95035 (CPT®) Hc Ot Iontophoresis Ea 15 Min      77866 (CPT®) Hc Ot Elec Stim Ea-Per 15 Min      75857 (CPT®) Hc Ot Ultrasound Ea 15 Min      26764 (CPT®) Hc Ot Self Care/Mgmt/Train Ea 15 Min 5     Total  15 1        Therapy Charges for Today     Code Description Service Date Service Provider Modifiers Qty    58389812301 HC OT EVAL MOD COMPLEXITY 4 1/16/2019 Hannah Santos OT GO 1    18797260981 HC OT THERAPEUTIC ACT EA 15 MIN 1/17/2019 Hannah Santos OT GO 1               Hannah Santos OT  1/17/2019

## 2019-01-17 NOTE — PLAN OF CARE
Problem: Patient Care Overview  Goal: Plan of Care Review  Outcome: Ongoing (interventions implemented as appropriate)   01/17/19 0905   Coping/Psychosocial   Plan of Care Reviewed With patient   OTHER   Outcome Summary Swallow re-evaluation complete. Recommend puree diet texture w/ nectar thick liquids. Continue ST per POC.

## 2019-01-17 NOTE — PROGRESS NOTES
Patient Care Team:  Rosa Zamora MD as PCP - General (Internal Medicine)  Geremias Shepherd MD as Consulting Physician (General Surgery)    Chief complaint right heel    Subjective .   58-year-old female with multiple medical comorbidities 3 days status post right heel wound debridement with graft placement.  Seen at bedside today in the ICU.  Continues to be somewhat more arousable and verbal today.  Seems to be slowly improving day by day.  Denies complaints of pain.  States she's cold.      Review of Systems  All systems were reviewed and negative except for chief complaint.    History  Past Medical History:   Diagnosis Date   • Diabetes mellitus (CMS/HCC)    • Disease of thyroid gland    • GERD (gastroesophageal reflux disease)    • Hypertension    , Past Surgical History:   Procedure Laterality Date   • EYE SURGERY     • INCISION AND DRAINAGE LEG Right 1/14/2019    Procedure: Right heel incision and drainage with graft application;  Surgeon: Ar Rueda DPM;  Location: [facility];  Service: Podiatry   • LEG SURGERY     , Family History   Problem Relation Age of Onset   • Asthma Mother    • Hypertension Father    • Stroke Father    , Social History     Tobacco Use   • Smoking status: Never Smoker   • Smokeless tobacco: Never Used   Substance Use Topics   • Alcohol use: No     Frequency: Never   • Drug use: No   , Medications Prior to Admission   Medication Sig Dispense Refill Last Dose   • ferrous sulfate 324 (65 Fe) MG tablet delayed-release EC tablet Take 324 mg by mouth 2 (Two) Times a Day.      • gabapentin (NEURONTIN) 600 MG tablet Take 600 mg by mouth 3 (Three) Times a Day.      • glipiZIDE (GLUCOTROL) 10 MG tablet Take 20 mg by mouth 2 (Two) Times a Day.      • Insulin Glargine (BASAGLAR KWIKPEN) 100 UNIT/ML injection pen Inject 30 Units under the skin into the appropriate area as directed Every Night.      • meclizine 25 MG chewable tablet chewable tablet Chew 25 mg 3 (Three) Times a Day As  "Needed (for dizziness or motion sickness).      • allopurinol (ZYLOPRIM) 300 MG tablet Take 300 mg by mouth Daily.      • amLODIPine (NORVASC) 5 MG tablet Take 5 mg by mouth Daily.  0    • esomeprazole (nexIUM) 40 MG capsule Take 40 mg by mouth Every Morning Before Breakfast.  0    • furosemide (LASIX) 20 MG tablet Take 20 mg by mouth 2 (Two) Times a Day.      • RA ASPIRIN EC 81 MG EC tablet Take 81 mg by mouth Daily.  0    • RA VITAMIN C 500 MG tablet Take 500 mg by mouth Daily.  0    • RA VITAMIN D-3 2000 units capsule Take 2,000 Units by mouth Daily.  0    • simvastatin (ZOCOR) 20 MG tablet Take 20 mg by mouth Every Night.  0    • TRADJENTA 5 MG tablet tablet Take 5 mg by mouth Daily.      , Scheduled Meds:    ertapenem 500 mg Intravenous Q24H   famotidine 20 mg Intravenous Daily   hydrALAZINE 25 mg Nasogastric Q8H   hydrocortisone sodium succinate 50 mg Intravenous Q12H   insulin detemir 30 Units Subcutaneous QAM   insulin regular 0-7 Units Subcutaneous Q6H   lactobacillus acidophilus 1 capsule Nasogastric Daily   levothyroxine 100 mcg Nasogastric Q AM   sodium chloride 3 mL Intravenous Q12H   , Continuous Infusions:    sodium chloride 10 mL/hr Last Rate: 10 mL/hr (01/15/19 0902)   , PRN Meds:  •  acetaminophen  •  CHAPSTICK  •  dextrose  •  dextrose  •  glucagon (human recombinant)  •  glycerin  •  heparin (porcine)  •  hydrALAZINE  •  LORazepam  •  Morphine **AND** naloxone  •  ondansetron **OR** ondansetron ODT **OR** ondansetron  •  promethazine  •  sodium chloride  •  sodium chloride  •  zinc oxide and Allergies:  Metformin and related    Objective     Vital Signs   /69   Pulse 67   Temp 97.5 °F (36.4 °C) (Oral)   Resp 11   Ht 165.1 cm (65\")   Wt (!) 147 kg (323 lb 6.4 oz)   SpO2 94%   BMI 53.82 kg/m²     Physical Exam: She is awake but not to the alert to place and time.  She does recognize her name and is able to follow commands.  Both lower extremities continue to be wrapped with absorbent " pads.  Right foot and heel are dressed.  Dressings intact.  No drainage or bleeding or strikethrough.  She is able to plantarflex and dorsiflex her digits with commands.  Feet are warm.  Capillary refill time is brisk.       Results Review: Laboratory data reviewed.  White blood cell count down to 11.  Hemoglobin 8.2.  Hematocrit 26.8.  Neutrophil percent 88.5.  Potassium 3.5.  Creatinine 2.1 area BUN 61.  No change to culture results.    Assessment/Plan   58-year-old morbidly obese diabetic female with end-stage renal disease, bilateral lower extremity lymphedema, right heel wound    Cellulitis of both lower extremities    Anemia    Bilateral edema of lower extremity    Cellulitis of lower extremity    Acute on chronic renal failure (CMS/HCC)    GERD (gastroesophageal reflux disease)    Hyperkalemia    Essential hypertension    Hypothermia    Hypothyroidism    Type 2 diabetes mellitus with complication (CMS/[facility])    Vitamin B12 deficiency    Acute metabolic encephalopathy  Physical therapy has left for the day so we will hope to have them place the wound VAC on the right heel wound tomorrow.  We should then be able to begin and continue a Monday, Wednesday, Friday wound VAC change schedule.  I will be out of town the remainder the weekend and return on Monday.  Call me with any questions.        Ar Rueda, RONEY  01/17/19  4:47 PM

## 2019-01-17 NOTE — THERAPY RE-EVALUATION
Acute Care - Speech Language Pathology   Swallow Re-Evaluation [facility]     Patient Name: Millicent Mckeon  : 1960  MRN: [id number]  Today's Date: 2019  Onset of Illness/Injury or Date of Surgery: 01/10/19     Referring Physician: Dr. Gregg      Admit Date: 1/10/2019    Visit Dx:     ICD-10-CM ICD-9-CM   1. Altered mental status, unspecified altered mental status type R41.82 780.97   2. Cellulitis of both lower extremities L03.115 682.6    L03.116    3. Impaired functional mobility, balance, gait, and endurance Z74.09 V49.89   4. Impaired mobility and ADLs Z74.09 799.89   5. Dysphagia, oropharyngeal phase R13.12 787.22     Patient Active Problem List   Diagnosis   • Altered mental status   • Anemia   • Bilateral edema of lower extremity   • Cellulitis of lower extremity   • Acute on chronic renal failure (CMS/HCC)   • GERD (gastroesophageal reflux disease)   • Hyperkalemia   • Essential hypertension   • Hypothermia   • Hypothyroidism   • Type 2 diabetes mellitus with complication (CMS/HCC)   • Vitamin B12 deficiency   • Cellulitis of both lower extremities   • Acute metabolic encephalopathy     Past Medical History:   Diagnosis Date   • Diabetes mellitus (CMS/HCC)    • Disease of thyroid gland    • GERD (gastroesophageal reflux disease)    • Hypertension      Past Surgical History:   Procedure Laterality Date   • EYE SURGERY     • INCISION AND DRAINAGE LEG Right 2019    Procedure: Right heel incision and drainage with graft application;  Surgeon: Ar Rueda DPM;  Location: [facility];  Service: Podiatry   • LEG SURGERY          SWALLOW EVALUATION (last 72 hours)      SLP Adult Swallow Evaluation     Row Name 19 0905 19 1030                Rehab Evaluation    Document Type  re-evaluation  -ES  evaluation  -TM       Total Evaluation Minutes, SLP  --  14  -TM       Subjective Information  no complaints  -ES  no complaints  -TM       Patient Observations  alert;cooperative  -ES   cooperative weakness  -TM       Patient/Family Observations  --   weakness  -TM       Patient Effort  adequate  -ES  adequate  -TM          General Information    Patient Profile Reviewed  yes  -ES  yes  -TM       Pertinent History Of Current Problem  CSE 1-16-19 rec repeat evaluation; encephalopathy, GERD, chest xray 1-14-19:no definite effusion  -ES  encephalopathy, GERD, renal, DM, obesity  -TM       Current Method of Nutrition  NPO;nasogastric feedings  -ES  NPO;nasogastric feedings  -TM       Precautions/Limitations, Hearing  --  WFL;for purposes of eval  -TM       Prior Level of Function-Communication  --  other (see comments) adequate for basic/medical needs  -TM       Prior Level of Function-Swallowing  --  no diet consistency restrictions  -TM       Plans/Goals Discussed with  --  patient;other (see comments) RN  -TM       Barriers to Rehab  medically complex  -ES  medically complex  -TM       Patient's Goals for Discharge  patient did not state  -ES  patient did not state  -TM          Pain Assessment    Additional Documentation  --  Pain Scale: Numbers Pre/Post-Treatment (Group)  -TM          Pain Scale: Numbers Pre/Post-Treatment    Pain Scale: Numbers, Pretreatment  0/10 - no pain  -ES  0/10 - no pain  -TM       Pain Scale: Numbers, Post-Treatment  0/10 - no pain  -ES  0/10 - no pain  -TM          Oral Motor and Function    Oral Lesions or Structural Abnormalities and/or variants  --  light, white lingual coating suggestive of oral candida RN reported improving with oral care  -TM       Dentition Assessment  edentulous, does not have dentures  -ES  edentulous, does not have dentures  -TM       Secretion Management  WNL/WFL  -ES  WNL/WFL  -TM       Mucosal Quality  dry  -ES  moist, healthy  -TM       Volitional Swallow  --  WFL  -TM       Volitional Cough  --  weak  -TM          Oral Musculature and Cranial Nerve Assessment    Oral Motor General Assessment  oral labial or buccal impairment;lingual  impairment  -ES  generalized oral motor weakness;oral labial or buccal impairment;lingual impairment  -TM       Oral Labial or Buccal Impairment, Detail, Cranial Nerve VII (Facial):  CN7: Motor Impairment;reduced ROM;reduced strength bilaterally;left labial droop  -ES  reduced strength bilaterally  -TM       Lingual Impairment, Detail. Cranial Nerves IX, XII (Glossopharyngeal and Hypoglossal)  CN12: Motor Impairment;reduced lingual ROM;reduced strength;reduced strength left  -ES  reduced strength;bilaterally  -TM       Oral Motor, Comment  mild-moderate impairment  -ES  --          General Eating/Swallowing Observations    Respiratory Support Currently in Use  --  nasal cannula  -TM       Eating/Swallowing Skills  self-fed  -ES  fed by SLP  -TM       Positioning During Eating  upright 90 degree;upright in bed  -ES  upright in bed  -TM       Utensils Used  --  spoon;cup;straw  -TM       Consistencies Trialed  pureed;thin liquids;nectar/syrup-thick liquids  -ES  pureed;thin liquids;nectar/syrup-thick liquids;honey-thick liquids  -TM       Pre SpO2 (%)  94  -ES  97  -TM       Post SpO2 (%)  97  -ES  96  -TM          Respiratory    Respiratory Status  --  other (see comments) increase in depth of breaths (more labored) post swallows  -TM       Respiratory Pattern  --  decrease control/incoordination of breathing swallow  -TM          Clinical Swallow Eval    Oral Prep Phase  impaired  -ES  impaired  -TM       Oral Transit  impaired  -ES  WFL  -TM       Oral Residue  WFL  -ES  WFL  -TM       Pharyngeal Phase  suspected pharyngeal impairment  -ES  suspected pharyngeal impairment  -TM       Esophageal Phase  suspected esophageal impairment  -ES  suspected esophageal impairment prior dx of GERD  -TM       Clinical Swallow Evaluation Summary  Moderate oropharyngeal dysphagia.  Pt was able to consume PO trials of puree w/ NTL w/ no overt s/s aspiration and demonstrates readiness for PO.    -ES  Moderate oral phase dysphagia  due to labial and lingual weakness (poor labial seal on spoon, but adequte w/straw), decreased lingual strength, white lingual coating suggestive of lingual candida, and edentulous.  Pt. exhibited some swallow-breathe dyscoordination, particularly with liquids of varying thickness.  Pt. exhibited intermittent cough with various trials/consistencies.  Suspect pharyngeal phase dysphagia.  Pt. not yet ready for full po diet even if modified.  Recommend:  1. continue NPO w/ng tf, allowing ice chips at times as javon,  2. meds via alternative method/IV as javon,  3. SLP to f/u to reassess tomorrow to see if better able to tolerate some po, as she has made improvements with alertness and strength in past couple of days.    -TM          Oral Prep Concerns    Oral Prep Concerns  incomplete or weak lip closure around spoon;spits out food prior to swallow;increased prep time  -ES  incomplete or weak lip closure around spoon;increased prep time  -TM       Incomplete or Weak Lip Closure Around Spoon  all consistencies  -ES  other (see comments) pureed trials  -TM       Spits Out Food Prior to Swallow  other (see comments) whole medication  -ES  --       Increased Prep Time  all consistencies  -ES  all consistencies  -TM       Oral Prep Concerns, Comment  moderate impairment  -ES  --          Oral Transit Concerns    Oral Transit Concerns  increased oral transit time  -ES  --       Increased Oral Transit Time  all consistencies  -ES  --       Oral Transit Concerns, Comment  moderate impairment  -ES  --          Pharyngeal Phase Concerns    Pharyngeal Phase Concerns  cough  -ES  cough intermittent w/various consistencies  -TM       Cough  thin  -ES  other (see comments);all consistencies post swallows with various consistencies  -TM       Pharyngeal Phase Concerns, Comment  delayed swallow initiation 4-6 sec., cough w/ thin liquids, no cough w/ NTL  -ES  aspiration is a concern  -TM          Esophageal Phase Concerns    Esophageal  Phase Concerns  other (see comments)  -ES  other (see comments) prior dx of GERD  -TM       Esophageal Phase Concerns, Comment  hx GERD  -ES  --          Clinical Impression    SLP Swallowing Diagnosis  moderate;oral dysfunction;pharyngeal dysfunction  -ES  moderate;oral dysfunction;suspected pharyngeal dysfunction;esophageal dysfunction  -TM       Functional Impact  risk of aspiration/pneumonia  -ES  risk of aspiration/pneumonia  -TM       Rehab Potential/Prognosis, Swallowing  adequate, monitor progress closely  -ES  --       Swallow Criteria for Skilled Therapeutic Interventions Met  demonstrates skilled criteria  -ES  demonstrates skilled criteria;other (see comments)  -TM          Recommendations    Therapy Frequency (Swallow)  PRN  -ES  PRN  -TM       Predicted Duration Therapy Intervention (Days)  until discharge  -ES  until discharge  -TM       SLP Diet Recommendation  puree;nectar thick liquids  -ES  NPO;ice chips between meals after oral care, with supervision  -TM       Recommended Diagnostics  other (see comments) ?FEES pending pt progress w/ tx  -ES  reassess via clinical swallow evaluation  -TM       Recommended Precautions and Strategies  upright posture during/after eating;small bites of food and sips of liquid  -ES  --       SLP Rec. for Method of Medication Administration  meds crushed;with pudding or applesauce  -ES  meds via alternate route  -TM       Monitor for Signs of Aspiration  yes;notify SLP if any concerns;cough  -ES  --       Anticipated Dischage Disposition  --  unknown  -TM          Swallow Goals (SLP)    Oral Nutrition/Hydration Goal Selection (SLP)  --  oral nutrition/hydration, SLP goal 1  -TM       Labial Strengthening Goal Selection (SLP)  --  labial strengthening, SLP goal 1  -TM       Lingual Strengthening Goal Selection (SLP)  --  lingual strengthening, SLP goal 1  -TM       Additional Documentation  --  labial strengthening goal selection (SLP);lingual strengthening goal  selection (SLP) other as needed pending f/u reassessment   -TM          Oral Nutrition/Hydration Goal 1 (SLP)    Oral Nutrition/Hydration Goal 1, SLP  --  tolerate po diet (pending reassessment)  -TM       Time Frame (Oral Nutrition/Hydration Goal 1, SLP)  --  by discharge  -TM       Barriers (Oral Nutrition/Hydration Goal 1, SLP)  --  medical complexity  -TM          Labial Strengthening Goal 1 (SLP)    Activity (Labial Strengthening Goal 1, SLP)  --  increase labial tone  -TM       Increase Labial Tone  --  labial resistance exercises;swallow trials  -TM       [place]/Accuracy (Labial Strengthening Goal 1, SLP)  --  independently (over 90% accuracy)  -TM       Time Frame (Labial Strengthening Goal 1, SLP)  --  by discharge  -TM       Barriers (Labial Strengthening Goal 1, SLP)  --  medical complexity  -TM          Lingual Strengthening Goal 1 (SLP)    Activity (Lingual Strengthening Goal 1, SLP)  --  increase lingual tone/sensation/control/coordination/movement;increase tongue back strength  -TM       Increase Lingual Tone/Sensation/Control/Coordination/Movement  --  lingual movement exercises;swallow trials;lingual resistance exercises  -TM       Increase Tongue Back Strength  --  lingual movement exercises;swallow trials;lingual resistance exercises  -TM       [place]/Accuracy (Lingual Strengthening Goal 1, SLP)  --  with minimal cues (75-90% accuracy)  -TM       Time Frame (Lingual Strengthening Goal 1, SLP)  --  by discharge  -TM       Barriers (Lingual Strengthening Goal 1, SLP)  --  medical complexity  -TM         User Key  (r) = Recorded By, (t) = Taken By, (c) = Cosigned By    Initials Name Effective Dates    TM Bhavani Wood 03/07/18 -     Jesika Garcia MS CCC-SLP 04/03/18 -           EDUCATION  The patient has been educated in the following areas:   Dysphagia (Swallowing Impairment).    SLP Recommendation and Plan  SLP Swallowing Diagnosis: moderate, oral dysfunction, pharyngeal  dysfunction  SLP Diet Recommendation: puree, nectar thick liquids  Recommended Precautions and Strategies: upright posture during/after eating, small bites of food and sips of liquid     Monitor for Signs of Aspiration: yes, notify SLP if any concerns, cough  Recommended Diagnostics: other (see comments)(?FEES pending pt progress w/ tx)  Swallow Criteria for Skilled Therapeutic Interventions Met: demonstrates skilled criteria     Rehab Potential/Prognosis, Swallowing: adequate, monitor progress closely  Therapy Frequency (Swallow): PRN  Predicted Duration Therapy Intervention (Days): until discharge       Plan of Care Reviewed With: patient  Plan of Care Review  Plan of Care Reviewed With: patient  Outcome Summary: Swallow re-evaluation complete.  Recommend puree diet texture w/ nectar thick liquids.  Continue ST per POC.    SLP GOALS     Row Name 01/17/19 0905 01/16/19 1030          Oral Nutrition/Hydration Goal 1 (SLP)    Oral Nutrition/Hydration Goal 1, SLP  Consume mech soft diet texture w/ thin liquids w/ no s/s aspiration  -ES  tolerate po diet (pending reassessment)  -TM     Time Frame (Oral Nutrition/Hydration Goal 1, SLP)  by discharge  -ES  by discharge  -TM     Barriers (Oral Nutrition/Hydration Goal 1, SLP)  --  medical complexity  -TM     Progress/Outcomes (Oral Nutrition/Hydration Goal 1, SLP)  goal revised this date;goal ongoing diet upgraded to puree w/ NTL  -ES  --        Labial Strengthening Goal 1 (SLP)    Activity (Labial Strengthening Goal 1, SLP)  increase labial tone  -ES  increase labial tone  -TM     Increase Labial Tone  labial resistance exercises;swallow trials  -ES  labial resistance exercises;swallow trials  -TM     [place]/Accuracy (Labial Strengthening Goal 1, SLP)  independently (over 90% accuracy)  -ES  independently (over 90% accuracy)  -TM     Time Frame (Labial Strengthening Goal 1, SLP)  by discharge  -ES  by discharge  -TM     Barriers (Labial Strengthening Goal 1, SLP)   --  medical complexity  -TM     Progress/Outcomes (Labial Strengthening Goal 1, SLP)  goal ongoing  -ES  --        Lingual Strengthening Goal 1 (SLP)    Activity (Lingual Strengthening Goal 1, SLP)  increase lingual tone/sensation/control/coordination/movement;increase tongue back strength  -ES  increase lingual tone/sensation/control/coordination/movement;increase tongue back strength  -TM     Increase Lingual Tone/Sensation/Control/Coordination/Movement  lingual movement exercises;swallow trials;lingual resistance exercises  -ES  lingual movement exercises;swallow trials;lingual resistance exercises  -TM     Increase Tongue Back Strength  lingual movement exercises;swallow trials;lingual resistance exercises  -ES  lingual movement exercises;swallow trials;lingual resistance exercises  -TM     [place]/Accuracy (Lingual Strengthening Goal 1, SLP)  with minimal cues (75-90% accuracy)  -ES  with minimal cues (75-90% accuracy)  -TM     Time Frame (Lingual Strengthening Goal 1, SLP)  by discharge  -ES  by discharge  -TM     Barriers (Lingual Strengthening Goal 1, SLP)  --  medical complexity  -TM     Progress/Outcomes (Lingual Strengthening Goal 1, SLP)  goal ongoing  -ES  --        Pharyngeal Strengthening Exercise Goal 1 (SLP)    Activity (Pharyngeal Strengthening Goal 1, SLP)  increase timing thin liquid trials  -ES  --     Increase Timing  gustatory stimulation (sour/cold);hard effortful swallow  -ES  --     [place]/Accuracy (Pharyngeal Strengthening Goal 1, SLP)  with minimal cues (75-90% accuracy)  -ES  --     Time Frame (Pharyngeal Strengthening Goal 1, SLP)  by discharge  -ES  --     Progress/Outcomes (Pharyngeal Strengthening Goal 1, SLP)  goal ongoing  -ES  --       User Key  (r) = Recorded By, (t) = Taken By, (c) = Cosigned By    Initials Name Provider Type    TM Bhavani Wood Speech and Language Pathologist    Jesika Garcia, MS CCC-SLP Speech and Language Pathologist           SLP  Outcome Measures (last 72 hours)      SLP Outcome Measures     Row Name 01/17/19 0905             SLP Outcome Measures    Outcome Measure Used?  Adult NOMS  -ES      SLP Diagnoses  Dysphagia  -ES         Adult FCM Scores    FCM Chosen  Swallowing  -ES      Swallowing FCM Score  3  -ES        User Key  (r) = Recorded By, (t) = Taken By, (c) = Cosigned By    Initials Name Effective Dates    Jesika Garcia, MS CCC-SLP 04/03/18 -            Time Calculation:   Time Calculation- SLP     Row Name 01/17/19 1009             Time Calculation- SLP    SLP Start Time  0905  -ES        User Key  (r) = Recorded By, (t) = Taken By, (c) = Cosigned By    Initials Name Provider Type    Jesika Garcia MS CCC-SLP Speech and Language Pathologist          Therapy Charges for Today     Code Description Service Date Service Provider Modifiers Qty    [id number] HC ST EVAL ORAL PHARYNG SWALLOW 4 1/17/2019 Jesika Braswell, MS CCC-SLP GN 1               Jesika Braswell MS CCC-SLP  1/17/2019

## 2019-01-17 NOTE — PLAN OF CARE
Problem: Patient Care Overview  Goal: Plan of Care Review  Outcome: Ongoing (interventions implemented as appropriate)      Problem: Skin Injury Risk (Adult)  Goal: Identify Related Risk Factors and Signs and Symptoms  Outcome: Ongoing (interventions implemented as appropriate)   01/17/19 1054   Skin Injury Risk (Adult)   Related Risk Factors (Skin Injury Risk) body weight extremes;critical care admission;edema;fluid intake inadequate;hospitalization prolonged;infection;mechanical forces;medical devices;medication;mobility impaired;moisture;nutritional deficiencies       Problem: Wound (Includes Pressure Injury) (Adult)  Goal: Signs and Symptoms of Listed Potential Problems Will be Absent, Minimized or Managed (Wound)  Outcome: Ongoing (interventions implemented as appropriate)   01/17/19 1054   Goal/Outcome Evaluation   Problems Assessed (Wound) all   Problems Present (Wound) delayed wound healing;situational response

## 2019-01-17 NOTE — NURSING NOTE
Pt resting in bed, began vomiting at beginning of assessment; nurse and pct to room; assisted with cleansing and assessment; based on pictures, abdomen has now scabbed over; right foot wound being assessed by dr. Rueda; bilateral lower legs are dry, hard  and scaley, consistent with venous and lympedema type of issues; one wound to right lower leg open, cleansed and covered; stage 3 pressure ulcers noted to posterior thigh/gluteal areas, cleansed and covered; barrier applied to will area after incontinent of large amount of stool and cleansed; excoriation noted under left breast, ultrasorb ordered, multiple bruising noted on body; plan of care explained, emotional support given

## 2019-01-18 ENCOUNTER — APPOINTMENT (OUTPATIENT)
Dept: GENERAL RADIOLOGY | Facility: HOSPITAL | Age: 59
End: 2019-01-18
Attending: INTERNAL MEDICINE

## 2019-01-18 LAB
ANION GAP SERPL CALCULATED.3IONS-SCNC: 9.4 MMOL/L (ref 10–20)
BACTERIA SPEC AEROBE CULT: ABNORMAL
BACTERIA SPEC AEROBE CULT: ABNORMAL
BUN BLD-MCNC: 65 MG/DL (ref 7–20)
BUN/CREAT SERPL: 34.2 (ref 7.1–23.5)
CALCIUM SPEC-SCNC: 8.5 MG/DL (ref 8.4–10.2)
CHLORIDE SERPL-SCNC: 112 MMOL/L (ref 98–107)
CO2 SERPL-SCNC: 25 MMOL/L (ref 26–30)
CREAT BLD-MCNC: 1.9 MG/DL (ref 0.6–1.3)
DEPRECATED RDW RBC AUTO: 62.8 FL (ref 37–54)
ERYTHROCYTE [DISTWIDTH] IN BLOOD BY AUTOMATED COUNT: 21.2 % (ref 11.5–14.5)
GFR SERPL CREATININE-BSD FRML MDRD: 27 ML/MIN/1.73
GFR SERPL CREATININE-BSD FRML MDRD: 33 ML/MIN/1.73
GLUCOSE BLD-MCNC: 156 MG/DL (ref 74–98)
GLUCOSE BLDC GLUCOMTR-MCNC: 105 MG/DL (ref 70–130)
GLUCOSE BLDC GLUCOMTR-MCNC: 126 MG/DL (ref 70–130)
GLUCOSE BLDC GLUCOMTR-MCNC: 163 MG/DL (ref 70–130)
GLUCOSE BLDC GLUCOMTR-MCNC: 192 MG/DL (ref 70–130)
HCT VFR BLD AUTO: 27.6 % (ref 37–47)
HGB BLD-MCNC: 8.4 G/DL (ref 12–16)
MCH RBC QN AUTO: 25.9 PG (ref 27–31)
MCHC RBC AUTO-ENTMCNC: 30.4 G/DL (ref 30–37)
MCV RBC AUTO: 85.2 FL (ref 81–99)
PLATELET # BLD AUTO: 139 10*3/MM3 (ref 130–400)
PMV BLD AUTO: 11 FL (ref 6–12)
POTASSIUM BLD-SCNC: 3.4 MMOL/L (ref 3.5–5.1)
RBC # BLD AUTO: 3.24 10*6/MM3 (ref 4.2–5.4)
SODIUM BLD-SCNC: 143 MMOL/L (ref 137–145)
WBC NRBC COR # BLD: 7.61 10*3/MM3 (ref 4.8–10.8)

## 2019-01-18 PROCEDURE — 25010000002 PROMETHAZINE PER 50 MG: Performed by: INTERNAL MEDICINE

## 2019-01-18 PROCEDURE — 25010000002 ERTAPENEM PER 500 MG: Performed by: INTERNAL MEDICINE

## 2019-01-18 PROCEDURE — 25010000002 ONDANSETRON PER 1 MG: Performed by: INTERNAL MEDICINE

## 2019-01-18 PROCEDURE — 85027 COMPLETE CBC AUTOMATED: CPT | Performed by: INTERNAL MEDICINE

## 2019-01-18 PROCEDURE — 63710000001 INSULIN ASPART PER 5 UNITS: Performed by: INTERNAL MEDICINE

## 2019-01-18 PROCEDURE — 25010000002 HYDROCORTISONE SODIUM SUCCINATE 100 MG RECONSTITUTED SOLUTION: Performed by: HOSPITALIST

## 2019-01-18 PROCEDURE — 63710000001 INSULIN DETEMIR PER 5 UNITS: Performed by: INTERNAL MEDICINE

## 2019-01-18 PROCEDURE — 82962 GLUCOSE BLOOD TEST: CPT

## 2019-01-18 PROCEDURE — 80048 BASIC METABOLIC PNL TOTAL CA: CPT | Performed by: INTERNAL MEDICINE

## 2019-01-18 PROCEDURE — 25010000002 HEPARIN (PORCINE) PER 1000 UNITS: Performed by: INTERNAL MEDICINE

## 2019-01-18 PROCEDURE — 97605 NEG PRS WND THER DME<=50SQCM: CPT

## 2019-01-18 PROCEDURE — 99232 SBSQ HOSP IP/OBS MODERATE 35: CPT | Performed by: INTERNAL MEDICINE

## 2019-01-18 PROCEDURE — 25010000002 LORAZEPAM PER 2 MG: Performed by: INTERNAL MEDICINE

## 2019-01-18 PROCEDURE — 92526 ORAL FUNCTION THERAPY: CPT

## 2019-01-18 PROCEDURE — 97530 THERAPEUTIC ACTIVITIES: CPT

## 2019-01-18 PROCEDURE — 97110 THERAPEUTIC EXERCISES: CPT

## 2019-01-18 PROCEDURE — 25010000002 HYDRALAZINE PER 20 MG: Performed by: HOSPITALIST

## 2019-01-18 RX ORDER — ALUMINA, MAGNESIA, AND SIMETHICONE 2400; 2400; 240 MG/30ML; MG/30ML; MG/30ML
15 SUSPENSION ORAL EVERY 6 HOURS PRN
Status: DISCONTINUED | OUTPATIENT
Start: 2019-01-18 | End: 2019-01-22 | Stop reason: HOSPADM

## 2019-01-18 RX ORDER — SODIUM CHLORIDE 9 MG/ML
INJECTION, SOLUTION INTRAVENOUS
Status: DISPENSED
Start: 2019-01-18 | End: 2019-01-19

## 2019-01-18 RX ORDER — HEPARIN SODIUM 5000 [USP'U]/ML
5000 INJECTION, SOLUTION INTRAVENOUS; SUBCUTANEOUS EVERY 12 HOURS SCHEDULED
Status: DISCONTINUED | OUTPATIENT
Start: 2019-01-18 | End: 2019-01-22 | Stop reason: HOSPADM

## 2019-01-18 RX ADMIN — HEPARIN SODIUM 5000 UNITS: 5000 INJECTION, SOLUTION INTRAVENOUS; SUBCUTANEOUS at 21:28

## 2019-01-18 RX ADMIN — HEPARIN SODIUM 5000 UNITS: 5000 INJECTION, SOLUTION INTRAVENOUS; SUBCUTANEOUS at 10:00

## 2019-01-18 RX ADMIN — HYDRALAZINE HYDROCHLORIDE 10 MG: 20 INJECTION INTRAMUSCULAR; INTRAVENOUS at 21:29

## 2019-01-18 RX ADMIN — INSULIN ASPART 2 UNITS: 100 INJECTION, SOLUTION INTRAVENOUS; SUBCUTANEOUS at 06:51

## 2019-01-18 RX ADMIN — INSULIN ASPART 2 UNITS: 100 INJECTION, SOLUTION INTRAVENOUS; SUBCUTANEOUS at 12:09

## 2019-01-18 RX ADMIN — HYDRALAZINE HYDROCHLORIDE 25 MG: 25 TABLET ORAL at 14:14

## 2019-01-18 RX ADMIN — HYDRALAZINE HYDROCHLORIDE 10 MG: 20 INJECTION INTRAMUSCULAR; INTRAVENOUS at 03:41

## 2019-01-18 RX ADMIN — INSULIN DETEMIR 30 UNITS: 100 INJECTION, SOLUTION SUBCUTANEOUS at 06:52

## 2019-01-18 RX ADMIN — ALUMINUM HYDROXIDE, MAGNESIUM HYDROXIDE, AND DIMETHICONE 15 ML: 400; 400; 40 SUSPENSION ORAL at 15:24

## 2019-01-18 RX ADMIN — PROMETHAZINE HYDROCHLORIDE 12.5 MG: 25 INJECTION INTRAMUSCULAR; INTRAVENOUS at 14:35

## 2019-01-18 RX ADMIN — SODIUM CHLORIDE 10 ML/HR: 9 INJECTION, SOLUTION INTRAVENOUS at 00:54

## 2019-01-18 RX ADMIN — LORAZEPAM 0.5 MG: 2 INJECTION, SOLUTION INTRAMUSCULAR; INTRAVENOUS at 21:29

## 2019-01-18 RX ADMIN — SODIUM CHLORIDE, PRESERVATIVE FREE 3 ML: 5 INJECTION INTRAVENOUS at 21:29

## 2019-01-18 RX ADMIN — HYDRALAZINE HYDROCHLORIDE 25 MG: 25 TABLET ORAL at 06:09

## 2019-01-18 RX ADMIN — Medication 1 CAPSULE: at 08:14

## 2019-01-18 RX ADMIN — HYDROCORTISONE SODIUM SUCCINATE 50 MG: 100 INJECTION, POWDER, FOR SOLUTION INTRAMUSCULAR; INTRAVENOUS at 01:59

## 2019-01-18 RX ADMIN — HYDROCORTISONE SODIUM SUCCINATE 50 MG: 100 INJECTION, POWDER, FOR SOLUTION INTRAMUSCULAR; INTRAVENOUS at 12:10

## 2019-01-18 RX ADMIN — FAMOTIDINE 20 MG: 10 INJECTION, SOLUTION INTRAVENOUS at 08:14

## 2019-01-18 RX ADMIN — HYDRALAZINE HYDROCHLORIDE 25 MG: 25 TABLET ORAL at 21:29

## 2019-01-18 RX ADMIN — LEVOTHYROXINE SODIUM 100 MCG: 100 TABLET ORAL at 06:00

## 2019-01-18 RX ADMIN — SODIUM CHLORIDE 1 G: 9 INJECTION, SOLUTION INTRAVENOUS at 14:15

## 2019-01-18 RX ADMIN — ONDANSETRON 4 MG: 2 INJECTION INTRAMUSCULAR; INTRAVENOUS at 09:54

## 2019-01-18 NOTE — PLAN OF CARE
Problem: Patient Care Overview  Goal: Plan of Care Review  Outcome: Ongoing (interventions implemented as appropriate)   01/18/19 1122   Coping/Psychosocial   Plan of Care Reviewed With patient   Plan of Care Review   Progress improving   OTHER   Outcome Summary F/u for ST targeting tolerance with puree with nectar-thick liquids and labial and lingual strengthening exercises. Pt. demosntrated some increased strength from previous 2 days and is recommended to have a MBS to determine risk, amount, and severity of possible aspiration and make least restrictive diet recs. D/W pt., MD, and RN.

## 2019-01-18 NOTE — PROGRESS NOTES
[facility]    Name:  Millicent Mckeon   Age:  58 y.o.  Sex:  female  :  1960  MRN:  [id number]   Visit Number:  [id number]  Admission Date:  1/10/2019  Date Of Service:  19  Primary Care Physician:  Marianela Albright APRN     LOS: 8 days :  Patient Care Team:  Marianela Albright APRN as PCP - General (Family Medicine)  Geremias Shepherd MD as Consulting Physician (General Surgery):    Chief Complaint:      Generalized weakness and heartburn.    Subjective / Interval History:     Ms. Mckeon is currently sitting up on the bed and is answering questions appropriately.  She states that she wants to go home.  She was transferred out of ICU yesterday.  She was seen by speech therapy and they have recommended puréed diet with nectar thick liquids.  She had some nausea and vomiting yesterday and it seems to have improved.  She does complain of heartburn and states that she takes Tums at home.  She was seen by physical and occupational therapy today and did work bed mobility exercises.  No significant overnight events.    She was transferred from [facility] emergency room on 1/10/2019 with symptoms of generalized weakness, altered mental status, shortness of breath and leg cellulitis.  She was noted to have hypothermia and hypotension and was placed on warming blanket on presentation.  She was placed on broad-spectrum IV antibiotic therapy with Zosyn and vancomycin and a consultation was obtained from Dr. Rueda from podiatry.  She was also noted to have acute renal failure and has been followed by Dr. Leone.  According to the sister Virginia, patient was in her normal state of health about 2 weeks ago since when she started having generalized weakness and worsening leg swelling.  She has morbid obesity and apparently uses a wheelchair.  She has had another visit to the emergency room recently and was evaluated with CAT scan of the chest and abdomen and  was told to have multiple lymph nodes.  She was subsequently recommended to go to [facility] for further evaluation of these lymph nodes.    Patient did undergo right foot surgical debridement by Dr. Rueda on 1/14/2019.  Patient was also started on temporary hemodialysis on 1/15/2019.  After foot surgery, patient started improving slowly with regards to her mental status.  Patient was on tube feeds which he has tolerated well.  Her NG tube was discontinued on 1/17/2019 and she has been placed on puréed diet with nectar thick liquids.  She was transferred out of the ICU on 1/17/2019.    Review of Systems:     General ROS: Patient denies any fevers, chills or loss of consciousness.  Complains of generalized weakness.  Respiratory ROS: Complains of cough and chest congestion.  Cardiovascular ROS: Denies chest pain or palpitations. No history of exertional chest pain.  Gastrointestinal ROS: Complains of nausea and heartburn.  Neurological ROS: Denies any focal weakness. No loss of consciousness. Denies any numbness.    Vital Signs:    Temp:  [97.5 °F (36.4 °C)-98.1 °F (36.7 °C)] 98.1 °F (36.7 °C)  Heart Rate:  [69-81] 78  Resp:  [14-20] 18  BP: (140-180)/(64-93) 166/64    Intake and output:    I/O last 3 completed shifts:  In: 1112 [P.O.:120; I.V.:752; Other:200; NG/GT:40]  Out: 2005 [Urine:2005]  I/O this shift:  In: 100 [P.O.:100]  Out: 250 [Urine:250]    Physical Examination:    General Appearance:  Alert and oriented to person, not in any acute distress.   Head:  Atraumatic and normocephalic, without obvious abnormality.   Eyes:          PERRLA, conjunctivae and sclerae normal, no Icterus. No pallor.  Extraocular movements are within normal limits.     Neck: Supple, short neck, trachea midline, no thyromegaly, no carotid bruit.   Lungs:   Chest shape is normal. Breath sounds decreased bilaterally due to thick chest wall.  No wheezing.  Bilateral scattered crackles heard. No Pleural rub or  bronchial breathing.  Right subclavian central line is in place.   Heart:  Normal S1 and S2, no murmur, no gallop, no rub. No JVD   Abdomen:   Normal bowel sounds, no masses, no organomegaly. Soft, non-tender, obese with a large pannus with healing ulcers noted on the skin.   Extremities: Large lower extremities due to obesity with multiple skin folds that are indurated and hyperemic.  Superficial skin ulcer noted on the lateral aspect of the right leg without any purulent discharge.  Surgical bandage is noted on the right foot.  Wound noted on the medial aspect of the right thigh with purulent slough.  Excoriations noted on bilateral legs and feet.     Skin: As described above.  Please see nurse's notes and the photographs in the chart for further details.  Lower extremity redness has significantly improved in the last several days.     Neurologic: Alert and obeying commands.  Moves all 4 limbs equally but does have significant generalized weakness.       Laboratory results:    Results from last 7 days   Lab Units 01/18/19 0515 01/17/19 0428 01/16/19  0537  01/14/19  0414 01/13/19  0423   SODIUM mmol/L 143 141 138   < > 138 138   POTASSIUM mmol/L 3.4* 3.5 3.9   < > 4.2 4.8   CHLORIDE mmol/L 112* 109* 109*   < > 109* 108*   CO2 mmol/L 25.0* 22.0* 25.0*   < > 19.0* 18.0*   BUN mg/dL 65* 61* 77*   < > 82* 73*   CREATININE mg/dL 1.90* 2.10* 2.70*   < > 4.00* 3.80*   CALCIUM mg/dL 8.5 8.4 7.8*   < > 7.9* 8.1*   BILIRUBIN mg/dL  --   --   --   --  0.4 0.4   ALK PHOS U/L  --   --   --   --  272* 323*   ALT (SGPT) U/L  --   --   --   --  27 34   AST (SGOT) U/L  --   --   --   --  16 21   GLUCOSE mg/dL 156* 208* 253*   < > 181* 202*    < > = values in this interval not displayed.     Results from last 7 days   Lab Units 01/18/19 0515 01/17/19 0428 01/16/19  0537   WBC 10*3/mm3 7.61 11.00* 13.99*   HEMOGLOBIN g/dL 8.4* 8.2* 8.4*   HEMATOCRIT % 27.6* 26.8* 26.8*   PLATELETS 10*3/mm3 139 108* 79*     Results from last 7  days   Lab Units 01/13/19  0901   INR  1.41*     Results from last 7 days   Lab Units 01/12/19  0431   CK TOTAL U/L 40     Results from last 7 days   Lab Units 01/14/19  1354   WOUNDCX  Light growth (2+) Proteus vulgaris*  Klebsiella pneumoniae ESBL*     I have reviewed the patient's laboratory results.    Radiology results:    Imaging Results (last 24 hours)     ** No results found for the last 24 hours. **        Medication Review:     I have reviewed the patients active and prn medications.       Cellulitis of both lower extremities    Anemia    Bilateral edema of lower extremity    Cellulitis of lower extremity    Acute on chronic renal failure (CMS/[facility])    GERD (gastroesophageal reflux disease)    Hyperkalemia    Essential hypertension    Hypothermia    Hypothyroidism    Type 2 diabetes mellitus with complication (CMS/[facility])    Vitamin B12 deficiency    Acute metabolic encephalopathy    Assessment:    1.  Sepsis with hypothermia, present on admission, improving.  2.  Bilateral lower extremity cellulitis, present on admission, improving.  3.  Acute metabolic encephalopathy, present on admission, improving.  4.  Acute renal failure, present on admission.  5.  Hyperkalemia, present on admission, improved.  6.  Acquired hypothyroidism, uncontrolled.  7.  Diabetes mellitus type 2 with nephropathy.  8.  Morbid obesity with a BMI of 63.  9.  Chronic venous insufficiency of the lower extremities.  10. Retrocrural, paratracheal, retro-peritoneal, bilateral inguinal lymphadenopathy, uncertain etiology.  11.  Left thyroid nodule 2 cm on recent CT scan.  12.  Thrombocytopenia likely secondary to sepsis, improving.    Plan:    Ms. Mckeon continues to improve with regards to her encephalopathy.  Her sepsis has improved.  She is currently on Invanz for her right ulcer and infection.  Her nasal swab for MRSA has been negative.  Blood cultures have been negative so far. She is being followed by Dr. Leone and her renal function  is slowly improving after dialysis.  No further dialysis is currently being planned.      Wound culture is growing ESBL Klebsiella and she is on Invanz.  Her heparin DVT prophylaxis was discontinued due to thrombocytopenia and it is slowly improving.  I will restart her back on heparin DVT prophylaxis.      Patient will be continued on physical and occupational therapy.  She will be placed on Maalox for heartburn.  She will need short-term rehabilitation at the time of discharge.    Patient's recent CT scan of the abdomen and pelvis done at [facility] emergency room noted several lymphadenopathy especially in the retroperitoneal and bilateral inguinal regions as well as paratracheal and retrocrural. Our initial CT scan reports done here did not mention these findings but I discussed with our radiologist on 1/14/2019 and he reviewed the scans done here and he stated that he does see the lymph nodes in the above-mentioned areas.  He also stated that the patient has a tiny nodule on the left lobe of the thyroid but he did not think it was 2 cm in size.  She was seen by Dr. Theodore from oncology on 1/16/2019.  He recommended outpatient follow-up for her lymphadenopathy and possible outpatient lymph node biopsy.       Samson Reyes MD  01/18/19  3:13 PM    Dictated utilizing Dragon dictation.

## 2019-01-18 NOTE — PLAN OF CARE
Problem: Patient Care Overview  Goal: Plan of Care Review  Outcome: Ongoing (interventions implemented as appropriate)   01/18/19 3378   Coping/Psychosocial   Plan of Care Reviewed With patient   Plan of Care Review   Progress improving   OTHER   Outcome Summary OT tx completed. Patient required max A for BM, sat EOB x 15 mins, completed grooming tasks and UB AAROM. Continue OT POC

## 2019-01-18 NOTE — PLAN OF CARE
Problem: Patient Care Overview  Goal: Plan of Care Review  Outcome: Ongoing (interventions implemented as appropriate)   01/18/19 1232   Coping/Psychosocial   Plan of Care Reviewed With patient   OTHER   Outcome Summary PT wound eval completed and wound vac applied to R heel wound.See care map for details. Cont PT per POC.

## 2019-01-18 NOTE — PROGRESS NOTES
"Discharge Planning Assessment  [facility]     Patient Name: Millicent Mckeon  MRN: [id number]  Today's Date: 1/18/2019    Admit Date: 1/10/2019    Discharge Needs Assessment     Row Name 01/18/19 1009       Living Environment    Lives With  sibling(s)    Name(s) of Who Lives With Patient  sister Claudette    Current Living Arrangements  home/apartment/condo    Provides Primary Care For  no one, unable/limited ability to care for self    Family Caregiver if Needed  other (see comments)    Family Caregiver Names  Brother Adolfo helps and eduarda Neal    Quality of Family Relationships  involved    Able to Return to Prior Arrangements  other (see comments)    Living Arrangement Comments  family wants rehab before returning home       Transition Planning    Patient/Family Anticipates Transition to  inpatient rehabilitation facility    Transportation Anticipated  family or friend will provide       Discharge Needs Assessment    Equipment Currently Used at Home  none    Anticipated Changes Related to Illness  inability to care for self    Outpatient/Agency/Support Group Needs  inpatient rehabilitation facility        Discharge Plan     Row Name 01/18/19 1019       Plan    Plan Spoke with patient who just came out of ICU yesterday.  She was slow to respond  but was alert and oriented.  Explained I had spoken to her sister Claudette (JOAQUIN), brother Adolfo and josesito Neal and they want her to go to rehab to get stronger before coming home.  The patient loudly stated \"I am going home\"  Encouraged patient that she needs to get stronger and she again stated \"No\" and then refused to speak anymore.  Discussion with her sister Claudette (JOAQUIN), brother Adolfo and josesito Neal and they are all in agreement for rehab at [facility].  Val advises she will be to see patient after work today.  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  They want the referral " sent to [facility].  Confirmed demos, contacts and updated PCP.  No issues with meds or food.  Cm will continue to assist.     Patient/Family in Agreement with Plan  yes        Destination      No service coordination in this encounter.      Durable Medical Equipment      No service coordination in this encounter.      Dialysis/Infusion      No service coordination in this encounter.      Home Medical Care      No service coordination in this encounter.      Community Resources      No service coordination in this encounter.        Expected Discharge Date and Time     Expected Discharge Date Expected Discharge Time    Jan 20, 2019         Demographic Summary     Row Name 01/18/19 1008       General Information    Admission Type  inpatient    Referral Source  admission list        Functional Status    No documentation.       Psychosocial    No documentation.       Abuse/Neglect    No documentation.       Legal    No documentation.       Substance Abuse    No documentation.       Patient Forms    No documentation.           Cookie Mckeon

## 2019-01-18 NOTE — THERAPY TREATMENT NOTE
Acute Care - Speech Language Pathology   Swallow Treatment Note  [facility]     Patient Name: Millicent Mckeon  : 1960  MRN: [id number]  Today's Date: 2019  Onset of Illness/Injury or Date of Surgery: 01/10/19     Referring Physician: Dr. Gregg      Admit Date: 1/10/2019    Visit Dx:      ICD-10-CM ICD-9-CM   1. Altered mental status, unspecified altered mental status type R41.82 780.97   2. Cellulitis of both lower extremities L03.115 682.6    L03.116    3. Impaired functional mobility, balance, gait, and endurance Z74.09 V49.89   4. Impaired mobility and ADLs Z74.09 799.89   5. Dysphagia, oropharyngeal phase R13.12 787.22     Patient Active Problem List   Diagnosis   • Altered mental status   • Anemia   • Bilateral edema of lower extremity   • Cellulitis of lower extremity   • Acute on chronic renal failure (CMS/HCC)   • GERD (gastroesophageal reflux disease)   • Hyperkalemia   • Essential hypertension   • Hypothermia   • Hypothyroidism   • Type 2 diabetes mellitus with complication (CMS/HCC)   • Vitamin B12 deficiency   • Cellulitis of both lower extremities   • Acute metabolic encephalopathy       Therapy Treatment  Rehabilitation Treatment Summary     Row Name 19 1010             Treatment Time/Intention    Discipline  speech language pathologist  -TM      Document Type  therapy note (daily note)  -TM      Subjective Information  no complaints  -TM      Mode of Treatment  speech-language pathology;individual therapy  -TM      Patient/Family Observations  flat affect; pt. repeatedly stated that she was ready to go home  -TM      Care Plan Review  care plan/treatment goals reviewed  -TM      Total Evaluation Minutes, SLP  13  -TM      Therapy Frequency (Swallow)  PRN  -TM      Patient Effort  adequate  -TM      Existing Precautions/Restrictions  -- swallowing precautions  -TM      Treatment Considerations/Comments  weakness  -TM      Patient Response to Treatment  tolerated well &  participated effectively with encouragement  -TM      Recorded by [TM] Bhavani Wood 01/18/19 1121      Row Name 01/18/19 1010             Vital Signs    Pre Patient Position  Sitting  -TM      Intra Patient Position  Sitting  -TM      Post Patient Position  Sitting  -TM      Recorded by [TM] Bhavani Wood 01/18/19 1121      Row Name 01/18/19 1010             Swallow Assessment/Intervention    Additional Documentation  General Eating/Swallowing Observations;Oral Motor Structure and Function  -TM      Recorded by [TM] Bhavani Wood 01/18/19 1121      Row Name 01/18/19 1010             Oral Motor and Function    Dentition Assessment  edentulous, does not have dentures  -TM      Secretion Management  WNL/WFL  -TM      Mucosal Quality  dry  -TM      Volitional Cough  WFL  -TM      Recorded by [TM] Bhavani Wood 01/18/19 1121      Row Name 01/18/19 1010             Oral Musculature and Cranial Nerve Assessment    Oral Motor General Assessment  oral labial or buccal impairment;lingual impairment  -TM      Oral Labial or Buccal Impairment, Detail, Cranial Nerve VII (Facial):  CN7: Motor Impairment;reduced ROM;reduced strength bilaterally;left labial droop  -TM      Lingual Impairment, Detail. Cranial Nerves IX, XII (Glossopharyngeal and Hypoglossal)  CN12: Motor Impairment;reduced lingual ROM;reduced strength;reduced strength left  -TM      Recorded by [TM] Bhavani Wood 01/18/19 1121      Row Name 01/18/19 1010             General Eating/Swallowing Observations    Eating/Swallowing Skills  fed by SLP  -TM      Positioning During Eating  upright 90 degree;upright in bed  -TM      Utensils Used  spoon;cup  -TM      Consistencies Trialed  pureed;nectar/syrup-thick liquids  -TM      Recorded by [TM] Bhavani Wood 01/18/19 1121      Row Name 01/18/19 1010             Oral Prep Concerns    Oral Prep Concerns  incomplete or weak lip closure around spoon;spits out food prior to swallow;increased prep time  -TM       Incomplete or Weak Lip Closure Around Spoon  all consistencies  -TM      Increased Prep Time  other (see comments) all trialed (puree with nectar-thick liq)  -TM      Recorded by [TM] Nina Bhavani 01/18/19 1121      Row Name 01/18/19 1010             Oral Transit Concerns    Oral Transit Concerns  increased oral transit time  -TM      Increased Oral Transit Time  all consistencies;other (see comments) all trialed (puree with nectar)  -TM      Recorded by [TM] [place], Bhavani 01/18/19 1121      Row Name 01/18/19 1010             Pharyngeal Phase Concerns    Pharyngeal Phase Concerns  other (see comments) aspiration with thin is concern;no s/s asp. w/puree & nectar  -TM      Recorded by [TM] Nina, Bhavani 01/18/19 1121      Row Name 01/18/19 1010             Clinical Impression    SLP Swallowing Diagnosis  moderate;oral dysfunction;pharyngeal dysfunction  -TM      Functional Impact  risk of aspiration/pneumonia  -TM      Rehab Potential/Prognosis, Swallowing  adequate, monitor progress closely  -TM      Swallow Criteria for Skilled Therapeutic Interventions Met  demonstrates skilled criteria  -TM      Recorded by [TM] Nina, Bhavani 01/18/19 1121      Row Name 01/18/19 1010             Recommendations    Predicted Duration Therapy Intervention (Days)  until discharge  -TM      SLP Diet Recommendation  puree;nectar thick liquids  -TM      Recommended Diagnostics  VFSS (MBS)  -TM      Recommended Precautions and Strategies  upright posture during/after eating;small bites of food and sips of liquid  -TM      SLP Rec. for Method of Medication Administration  meds crushed;with pudding or applesauce  -TM      Monitor for Signs of Aspiration  yes;notify SLP if any concerns;cough  -TM      Recorded by [TM] Nina, Bhavani 01/18/19 1121      Row Name                Wound 01/10/19 1925 Right foot    Wound - Properties Group Date first assessed: 01/10/19 [CH] Time first assessed: 1925 [CH] Side: Right [CH]  Location: foot [CH] Stage, Pressure Injury: deep tissue injury;unstageable [CH] Additional Comments: right heel. [CH2] Recorded by:  [CH] Jacqueline Munroe RN 01/11/19 0215 [CH2] Jacqueline Munroe RN 01/11/19 0225    Row Name                Wound 01/10/19 1925 Right lower;lateral leg abrasion    Wound - Properties Group Date first assessed: 01/10/19 [CH] Time first assessed: 1925 [CH] Side: Right [CH] Orientation: lower;lateral [CH] Location: leg [CH] Type: abrasion [CH] Stage, Pressure Injury: Stage 1 [CH] Recorded by:  [CH] Jacqueline Munroe RN 01/11/19 0217    Row Name                Wound 01/10/19 1925 Left anterior;lower;proximal leg unspecified    Wound - Properties Group Date first assessed: 01/10/19 [CH] Time first assessed: 1925 [CH] Side: Left [CH] Orientation: anterior;lower;proximal [CH] Location: leg [CH] Type: unspecified [CH] Recorded by:  [CH] Jacqueline Munroe RN 01/11/19 0221    Row Name                Wound 01/10/19 1925 Left gluteal abrasion    Wound - Properties Group Date first assessed: 01/10/19 [CH] Time first assessed: 1925 [CH] Present On Admission : yes;picture taken [CH] Side: Left [CH] Location: gluteal [CH] Type: abrasion [CH] Recorded by:  [CH] Jacqueline Munroe RN 01/11/19 0224    Row Name                Wound 01/10/19 1925 Right posterior thigh abrasion    Wound - Properties Group Date first assessed: 01/10/19 [CH] Time first assessed: 1925 [CH] Present On Admission : yes;picture taken [CH] Side: Right [CH] Orientation: posterior [CH] Location: thigh [CH] Type: abrasion [CH] Recorded by:  [CH] Jacqueline Munroe RN 01/11/19 0230    Row Name                Wound 01/10/19 1925    Wound - Properties Group Date first assessed: 01/10/19 [CH] Time first assessed: 1925 [CH] Present On Admission : yes;picture taken [CH] Recorded by:  [CH] Jacqueline Munroe RN 01/11/19 0302    Row Name                Wound 01/10/19 1925 abdomen    Wound - Properties Group Date first assessed: 01/10/19 [CH] Time first  assessed: 1925 [CH] Present On Admission : yes;picture taken [CH] Location: abdomen [CH] Recorded by:  [CH] Jacqueline Munroe RN 01/11/19 0323    Row Name 01/18/19 1010             Outcome Summary/Treatment Plan (SLP)    Daily Summary of Progress (SLP)  progress toward functional goals is gradual  -TM      Barriers to Overall Progress (SLP)  medical complexity  -TM      Plan for Continued Treatment (SLP)  pending MBS results  -TM      Recorded by [TM] Bhavani Wood 01/18/19 1121        User Key  (r) = Recorded By, (t) = Taken By, (c) = Cosigned By    Initials Name Effective Dates Discipline    CH Jacqueline Munroe RN 11/07/16 -  Nurse    TM Bhavani Wood 03/07/18 -  SLP          Outcome Summary  Outcome Summary/Treatment Plan (SLP)  Daily Summary of Progress (SLP): progress toward functional goals is gradual (01/18/19 1010 : Bhavani Wood)  Barriers to Overall Progress (SLP): medical complexity (01/18/19 1010 : Bhavani Wood)  Plan for Continued Treatment (SLP): pending MBS results (01/18/19 1010 : Bhavani Wood)      SLP GOALS     Row Name 01/18/19 1010 01/17/19 0905 01/16/19 1030       Oral Nutrition/Hydration Goal 1 (SLP)    Oral Nutrition/Hydration Goal 1, SLP  tolerating pureed diet with nectar-thick liq  -TM  Consume mech soft diet texture w/ thin liquids w/ no s/s aspiration  -ES  tolerate po diet (pending reassessment)  -TM    Time Frame (Oral Nutrition/Hydration Goal 1, SLP)  other (see comments) pending MBS results, plan to upgrade diet if able   -TM  by discharge  -ES  by discharge  -TM    Barriers (Oral Nutrition/Hydration Goal 1, SLP)  medical complexity  -TM  --  medical complexity  -TM    Progress/Outcomes (Oral Nutrition/Hydration Goal 1, SLP)  continuing progress toward goal  -TM  goal revised this date;goal ongoing diet upgraded to puree w/ NTL  -ES  --       Labial Strengthening Goal 1 (SLP)    Activity (Labial Strengthening Goal 1, SLP)  increase labial tone  -TM  increase labial tone   -ES  increase labial tone  -TM    Increase Labial Tone  labial resistance exercises;swallow trials  -TM  labial resistance exercises;swallow trials  -ES  labial resistance exercises;swallow trials  -TM    [place]/Accuracy (Labial Strengthening Goal 1, SLP)  independently (over 90% accuracy) 70%   -TM  independently (over 90% accuracy)  -ES  independently (over 90% accuracy)  -TM    Time Frame (Labial Strengthening Goal 1, SLP)  by discharge  -TM  by discharge  -ES  by discharge  -TM    Barriers (Labial Strengthening Goal 1, SLP)  medical complexity  -TM  --  medical complexity  -TM    Progress/Outcomes (Labial Strengthening Goal 1, SLP)  goal ongoing;continuing progress toward goal demonstrated 70% with labial ex. including resistance  -TM  goal ongoing  -ES  --       Lingual Strengthening Goal 1 (SLP)    Activity (Lingual Strengthening Goal 1, SLP)  increase lingual tone/sensation/control/coordination/movement;increase tongue back strength  -TM  increase lingual tone/sensation/control/coordination/movement;increase tongue back strength  -ES  increase lingual tone/sensation/control/coordination/movement;increase tongue back strength  -TM    Increase Lingual Tone/Sensation/Control/Coordination/Movement  lingual movement exercises;swallow trials;lingual resistance exercises  -TM  lingual movement exercises;swallow trials;lingual resistance exercises  -ES  lingual movement exercises;swallow trials;lingual resistance exercises  -TM    Increase Tongue Back Strength  lingual movement exercises;swallow trials;lingual resistance exercises  -TM  lingual movement exercises;swallow trials;lingual resistance exercises  -ES  lingual movement exercises;swallow trials;lingual resistance exercises  -TM    [place]/Accuracy (Lingual Strengthening Goal 1, SLP)  with minimal cues (75-90% accuracy)  -TM  with minimal cues (75-90% accuracy)  -ES  with minimal cues (75-90% accuracy)  -TM    Time Frame (Lingual Strengthening Goal  1, SLP)  by discharge  -TM  by discharge  -ES  by discharge  -TM    Barriers (Lingual Strengthening Goal 1, SLP)  medical complexity  -TM  --  medical complexity  -TM    Progress/Outcomes (Lingual Strengthening Goal 1, SLP)  goal ongoing;continuing progress toward goal 75% lingual lateralization & strength ex.  -TM  goal ongoing  -ES  --       Pharyngeal Strengthening Exercise Goal 1 (SLP)    Activity (Pharyngeal Strengthening Goal 1, SLP)  --  increase timing thin liquid trials  -ES  --    Increase Timing  --  gustatory stimulation (sour/cold);hard effortful swallow  -ES  --    [place]/Accuracy (Pharyngeal Strengthening Goal 1, SLP)  --  with minimal cues (75-90% accuracy)  -ES  --    Time Frame (Pharyngeal Strengthening Goal 1, SLP)  --  by discharge  -ES  --    Progress/Outcomes (Pharyngeal Strengthening Goal 1, SLP)  --  goal ongoing  -ES  --      User Key  (r) = Recorded By, (t) = Taken By, (c) = Cosigned By    Initials Name Provider Type    TM Bhavani Wood Speech and Language Pathologist    Jesika Garcia, MS CCC-SLP Speech and Language Pathologist          EDUCATION  The patient has been educated in the following areas:   Dysphagia (Swallowing Impairment) Modified Diet Instruction.    SLP Recommendation and Plan  SLP Swallowing Diagnosis: moderate, oral dysfunction, pharyngeal dysfunction  SLP Diet Recommendation: puree, nectar thick liquids  Recommended Precautions and Strategies: upright posture during/after eating, small bites of food and sips of liquid     Monitor for Signs of Aspiration: yes, notify SLP if any concerns, cough  Recommended Diagnostics: VFSS (MBS)  Swallow Criteria for Skilled Therapeutic Interventions Met: demonstrates skilled criteria     Rehab Potential/Prognosis, Swallowing: adequate, monitor progress closely  Therapy Frequency (Swallow): PRN  Predicted Duration Therapy Intervention (Days): until discharge       Plan of Care Reviewed With: patient  Plan of Care  Review  Plan of Care Reviewed With: patient  Daily Summary of Progress (SLP): progress toward functional goals is gradual  Plan for Continued Treatment (SLP): pending MBS results  Progress: improving  Outcome Summary: F/u for ST targeting tolerance with puree with nectar-thick liquids and labial and lingual strengthening exercises.  Pt. demosntrated some increased strength from previous 2 days and is recommended to have a MBS to determine risk, amount, and severity of possible aspiration and make least restrictive diet recs.  D/W pt., MD, and RN.         SLP Outcome Measures (last 72 hours)      SLP Outcome Measures     Row Name 01/17/19 0905             SLP Outcome Measures    Outcome Measure Used?  Adult NOMS  -ES      SLP Diagnoses  Dysphagia  -ES         Adult FCM Scores    FCM Chosen  Swallowing  -ES      Swallowing FCM Score  3  -ES        User Key  (r) = Recorded By, (t) = Taken By, (c) = Cosigned By    Initials Name Effective Dates    ES Jesika Braswell, MS CCC-SLP 04/03/18 -              Time Calculation:   Time Calculation- SLP     Row Name 01/18/19 1136             Time Calculation- SLP    SLP Start Time  1010  -TM      SLP Stop Time  1023  -TM      SLP Time Calculation (min)  13 min  -TM        User Key  (r) = Recorded By, (t) = Taken By, (c) = Cosigned By    Initials Name Provider Type     Bhavani Wood Speech and Language Pathologist          Therapy Charges for Today     Code Description Service Date Service Provider Modifiers Qty    [id number] HC ST TREATMENT SWALLOW 1 1/18/2019 Bhavani Wood GN 1                 Bhavani Wood  1/18/2019

## 2019-01-18 NOTE — THERAPY EVALUATION
Acute Care - Wound/Debridement Initial Evaluation   [facility]     Patient Name: Millicent Mckeon  : 1960  MRN: [id number]  Today's Date: 2019  Onset of Illness/Injury or Date of Surgery: 19  Date of Referral to PT: 19   Referring Physician: Mohit      Admit Date: 1/10/2019    Visit Dx:    ICD-10-CM ICD-9-CM   1. Altered mental status, unspecified altered mental status type R41.82 780.97   2. Cellulitis of both lower extremities L03.115 682.6    L03.116    3. Impaired functional mobility, balance, gait, and endurance Z74.09 V49.89   4. Impaired mobility and ADLs Z74.09 799.89   5. Dysphagia, oropharyngeal phase R13.12 787.22       Patient Active Problem List   Diagnosis   • Altered mental status   • Anemia   • Bilateral edema of lower extremity   • Cellulitis of lower extremity   • Acute on chronic renal failure (CMS/HCC)   • GERD (gastroesophageal reflux disease)   • Hyperkalemia   • Essential hypertension   • Hypothermia   • Hypothyroidism   • Type 2 diabetes mellitus with complication (CMS/HCC)   • Vitamin B12 deficiency   • Cellulitis of both lower extremities   • Acute metabolic encephalopathy        Past Medical History:   Diagnosis Date   • Diabetes mellitus (CMS/HCC)    • Disease of thyroid gland    • GERD (gastroesophageal reflux disease)    • Hypertension         Past Surgical History:   Procedure Laterality Date   • EYE SURGERY     • INCISION AND DRAINAGE LEG Right 2019    Procedure: Right heel incision and drainage with graft application;  Surgeon: Ar Rueda DPM;  Location: [facility];  Service: Podiatry   • LEG SURGERY           LDA Wound     Row Name 19 0901             Wound 01/10/19 1925 Right foot    Wound - Properties Group Date first assessed: 01/10/19  -CH Time first assessed:   - Side: Right  - Location: foot  -CH Stage, Pressure Injury: deep tissue injury;unstageable  -CH Additional Comments: right heel.  -CH    Dressing Appearance  dressing  loose;other (see comments) dried, bloody drainage  -LM      Base  maroon/purple;black eschar  -LM      Black (%), Wound Tissue Color  20  -LM      Red (%), Wound Tissue Color  80 Maroon/purplish   -LM      Wound Length (cm)  3.5 cm  -LM      Wound Width (cm)  4.5 cm  -LM      Wound Depth (cm)  1.5 cm  -LM      Drainage Characteristics/Odor  serosanguineous;bleeding controlled  -LM      Drainage Amount  small  -LM      Care, Wound  -- NWPT applied.  -LM         Wound 01/10/19 1925 Right lower;lateral leg abrasion    Wound - Properties Group Date first assessed: 01/10/19  -CH Time first assessed: 1925  -CH Side: Right  -CH Orientation: lower;lateral  -CH Location: leg  -CH Type: abrasion  -CH Stage, Pressure Injury: Stage 1  -CH       Wound 01/10/19 1925 Left anterior;lower;proximal leg unspecified    Wound - Properties Group Date first assessed: 01/10/19  -CH Time first assessed: 1925  -CH Side: Left  -CH Orientation: anterior;lower;proximal  -CH Location: leg  -CH Type: unspecified  -CH       Wound 01/10/19 1925 Left gluteal abrasion    Wound - Properties Group Date first assessed: 01/10/19  -CH Time first assessed: 1925  -CH Present On Admission : yes;picture taken  -CH Side: Left  -CH Location: gluteal  -CH Type: abrasion  -CH       Wound 01/10/19 1925 Right posterior thigh abrasion    Wound - Properties Group Date first assessed: 01/10/19  -CH Time first assessed: 1925  -CH Present On Admission : yes;picture taken  -CH Side: Right  -CH Orientation: posterior  -CH Location: thigh  -CH Type: abrasion  -CH       Wound 01/10/19 1925    Wound - Properties Group Date first assessed: 01/10/19  -CH Time first assessed: 1925  -CH Present On Admission : yes;picture taken  -CH       Wound 01/10/19 1925 abdomen    Wound - Properties Group Date first assessed: 01/10/19  -CH Time first assessed: 1925  -CH Present On Admission : yes;picture taken  -CH Location: abdomen  -CH       NPWT (Negative Pressure Wound Therapy) 01/18/19  0901 R heel    NPWT (Negative Pressure Wound Therapy) - Properties Group Placement Date: 01/18/19  -LM Placement Time: 0901 -LM Location: R heel  -LM      User Key  (r) = Recorded By, (t) = Taken By, (c) = Cosigned By    Initials Name Provider Type    Jacqueline Cruz, RN Registered Nurse    Manuela Acevedo, PT Physical Therapist        Wound 01/10/19 1925 Right foot (Active)   Dressing Appearance dressing loose;other (see comments) 1/18/2019  9:01 AM   Closure Adhesive bandage 1/18/2019  8:05 AM   Base maroon/purple;black eschar 1/18/2019  9:01 AM   Black (%), Wound Tissue Color 20 1/18/2019  9:01 AM   Red (%), Wound Tissue Color 80 1/18/2019  9:01 AM   Wound Length (cm) 3.5 cm 1/18/2019  9:01 AM   Wound Width (cm) 4.5 cm 1/18/2019  9:01 AM   Wound Depth (cm) 1.5 cm 1/18/2019  9:01 AM   Drainage Characteristics/Odor serosanguineous;bleeding controlled 1/18/2019  9:01 AM   Drainage Amount small 1/18/2019  9:01 AM   Dressing Care, Wound other (see comments) 1/17/2019  3:50 PM       Wound 01/10/19 1925 Right lower;lateral leg abrasion (Active)   Dressing Appearance dry;intact 1/18/2019  8:05 AM   Base dressing in place, unable to visualize 1/18/2019  8:05 AM   Drainage Amount none 1/18/2019  8:05 AM   Dressing Care, Wound silicone 1/17/2019  3:50 PM       Wound 01/10/19 1925 Left anterior;lower;proximal leg unspecified (Active)   Dressing Appearance intact;dry 1/18/2019  8:05 AM   Closure Adhesive bandage;VINICIUS 1/18/2019  8:05 AM   Drainage Amount none 1/18/2019  8:05 AM       Wound 01/10/19 1925 Left gluteal abrasion (Active)   Dressing Appearance dry;intact 1/18/2019  8:05 AM   Base dressing in place, unable to visualize 1/18/2019  8:05 AM   Periwound pink 1/18/2019  8:05 AM   Periwound Temperature warm 1/18/2019  8:05 AM   Periwound Skin Turgor soft 1/18/2019  8:05 AM   Edges irregular 1/18/2019  8:05 AM   Drainage Amount none 1/18/2019  8:05 AM   Dressing Care, Wound silicone 1/17/2019  3:50 PM       Wound  01/10/19 1925 Right posterior thigh abrasion (Active)   Dressing Appearance dry;intact 1/18/2019  8:05 AM   Base dressing in place, unable to visualize 1/18/2019  8:05 AM   Periwound pink 1/18/2019  8:05 AM   Periwound Temperature warm 1/18/2019  8:05 AM   Periwound Skin Turgor soft 1/18/2019  8:05 AM   Edges irregular 1/18/2019  8:05 AM   Drainage Characteristics/Odor serosanguineous 1/18/2019  8:05 AM   Drainage Amount none 1/18/2019  8:05 AM   Dressing Care, Wound silicone 1/17/2019  3:50 PM       Wound 01/10/19 1925 (Active)   Drainage Amount none 1/18/2019  8:05 AM       Wound 01/10/19 1925 abdomen (Active)   Dressing Appearance open to air 1/18/2019  8:05 AM   Closure Open to air 1/18/2019  8:05 AM   Base scab 1/18/2019  8:05 AM   Drainage Amount none 1/18/2019  8:05 AM       NPWT (Negative Pressure Wound Therapy) 01/18/19 0901 R heel (Active)   Therapy Setting continuous therapy 1/18/2019  9:01 AM   Dressing foam, black 1/18/2019  9:01 AM   Contact Layer other (see comments) 1/18/2019  9:01 AM   Pressure Setting 125 mmHg 1/18/2019  9:01 AM   Sponges Inserted 1 1/18/2019  9:01 AM   Sponges Removed other (see comments) 1/18/2019  9:01 AM         WOUND DEBRIDEMENT                        PT ASSESSMENT (last 12 hours)      Physical Therapy Evaluation     Row Name 01/18/19 0901          PT Evaluation Time/Intention    Subjective Information  no complaints  -LM     Document Type  evaluation;wound care  -LM     Mode of Treatment  physical therapy  -LM     Patient Effort  good  -LM     Symptoms Noted During/After Treatment  none  -LM     Row Name 01/18/19 0901          General Information    Patient Profile Reviewed?  yes  -LM     Onset of Illness/Injury or Date of Surgery  01/17/19  -     Referring Physician  Mohit  -     Patient Observations  alert;cooperative;agree to therapy  -LM     General Observations of Patient  Pt received supine in bed. R heel dressing not intact.  -LM     Existing  Precautions/Restrictions  non-weight bearing;right;brace worn when out of bed  -LM     Risks Reviewed  patient:;increased discomfort  -LM     Benefits Reviewed  patient:;improve skin integrity  -LM     Barriers to Rehab  medically complex  -LM     Row Name 01/18/19 0901          Cognitive Assessment/Intervention- PT/OT    Orientation Status (Cognition)  oriented x 4  -LM     Follows Commands (Cognition)  WFL  -LM     Row Name 01/18/19 0901          Pain Assessment    Additional Documentation  Pain Scale: Numbers Pre/Post-Treatment (Group)  -LM     Row Name 01/18/19 0901          Pain Scale: Numbers Pre/Post-Treatment    Pain Scale: Numbers, Pretreatment  0/10 - no pain  -LM     Pain Scale: Numbers, Post-Treatment  0/10 - no pain  -LM     Row Name             Wound 01/10/19 1925 Right foot    Wound - Properties Group Date first assessed: 01/10/19  -CH Time first assessed: 1925  -CH Side: Right  -CH Location: foot  -CH Stage, Pressure Injury: deep tissue injury;unstageable  -CH Additional Comments: right heel.  -CH    Row Name             Wound 01/10/19 1925 Right lower;lateral leg abrasion    Wound - Properties Group Date first assessed: 01/10/19  -CH Time first assessed: 1925  -CH Side: Right  -CH Orientation: lower;lateral  -CH Location: leg  -CH Type: abrasion  -CH Stage, Pressure Injury: Stage 1  -CH    Row Name             Wound 01/10/19 1925 Left anterior;lower;proximal leg unspecified    Wound - Properties Group Date first assessed: 01/10/19  -CH Time first assessed: 1925  -CH Side: Left  -CH Orientation: anterior;lower;proximal  -CH Location: leg  -CH Type: unspecified  -CH    Row Name             Wound 01/10/19 1925 Left gluteal abrasion    Wound - Properties Group Date first assessed: 01/10/19  - Time first assessed: 1925  -CH Present On Admission : yes;picture taken  -CH Side: Left  -CH Location: gluteal  -CH Type: abrasion  -CH    Row Name             Wound 01/10/19 1925 Right posterior thigh abrasion     Wound - Properties Group Date first assessed: 01/10/19  -CH Time first assessed: 1925  -CH Present On Admission : yes;picture taken  -CH Side: Right  -CH Orientation: posterior  -CH Location: thigh  -CH Type: abrasion  -CH    Row Name             Wound 01/10/19 1925    Wound - Properties Group Date first assessed: 01/10/19  -CH Time first assessed: 1925  -CH Present On Admission : yes;picture taken  -CH    Row Name             Wound 01/10/19 1925 abdomen    Wound - Properties Group Date first assessed: 01/10/19  -CH Time first assessed: 1925  -CH Present On Admission : yes;picture taken  -CH Location: abdomen  -CH    Row Name 01/18/19 0901          NPWT (Negative Pressure Wound Therapy) 01/18/19 0901 R heel    NPWT (Negative Pressure Wound Therapy) - Properties Group Placement Date: 01/18/19  -LM Placement Time: 0901  -LM Location: R heel  -LM    Therapy Setting  continuous therapy  -LM     Dressing  foam, black  -LM     Contact Layer  other (see comments) 1 sheet of Adaptic folded to a thickness of 6 layers  -LM     Pressure Setting  125 mmHg  -LM     Sponges Inserted  1  -LM     Sponges Removed  other (see comments) Zero- initial application  -LM     Row Name 01/18/19 0901          Coping    Observed Emotional State  calm;cooperative;flat  -LM     Verbalized Emotional State  acceptance  -LM     Row Name 01/18/19 0901          Plan of Care Review    Plan of Care Reviewed With  patient  -LM     Row Name 01/18/19 0901          Physical Therapy Clinical Impression    Date of Referral to PT  01/17/19  -LM     PT Diagnosis (PT Clinical Impression)  R heel wound  -LM     Patient/Family Goals Statement (PT Clinical Impression)  None stated.  -LM     Criteria for Skilled Interventions Met (PT Clinical Impression)  yes;treatment indicated  -LM     Rehab Potential (PT Clinical Summary)  good, to achieve stated therapy goals  -LM     Care Plan Review (PT)  evaluation/treatment results reviewed;care plan/treatment goals  reviewed;risks/benefits reviewed;current/potential barriers reviewed;patient/other agree to care plan  -LM     Row Name 01/18/19 0901          Wound Care Goal 1 (PT)    Wound Care Goal 1 (PT)  Decrease wound dimensions by .5 cm in each dimension.  -LM     Time Frame (Wound Care Goal 1, PT)  2 weeks  -LM     Progress/Outcome (Wound Care Goal 1, PT)  goal ongoing  -LM     Row Name 01/18/19 0901          Positioning and Restraints    Pre-Treatment Position  in bed  -LM     Post Treatment Position  bed  -LM     In Bed  fowlers;call light within reach;encouraged to call for assist  -LM       User Key  (r) = Recorded By, (t) = Taken By, (c) = Cosigned By    Initials Name Provider Type    Jacqueline Cruz, RN Registered Nurse    Manuela Acevedo PT Physical Therapist        Physical Therapy Education     Title: PT OT SLP Therapies (In Progress)     Topic: Physical Therapy (Done)     Point: Mobility training (Done)     Learning Progress Summary           Patient Acceptance, E,TB, VU by LM at 1/17/2019  3:16 PM    Comment:  Ther ex for strengthening.    Acceptance, E, NR by EB at 1/17/2019 10:30 AM    Comment:  pt alert and Dr Reyes at bedside and discussed plan of care with patient.  No family at bedside.    Acceptance, E,TB, VU by LM at 1/16/2019 11:45 AM    Comment:  Purpose of PT/POC.                   Point: Home exercise program (Done)     Learning Progress Summary           Patient Acceptance, E,TB, VU by LM at 1/17/2019  3:16 PM    Comment:  Ther ex for strengthening.    Acceptance, E, NR by EB at 1/17/2019 10:30 AM    Comment:  pt alert and Dr Reyes at bedside and discussed plan of care with patient.  No family at bedside.    Acceptance, E,TB, VU by LM at 1/16/2019 11:45 AM    Comment:  Purpose of PT/POC.                   Point: Precautions (Done)     Learning Progress Summary           Patient Acceptance, E,TB, VU by LM at 1/17/2019  3:16 PM    Comment:  Ther ex for strengthening.    Acceptance, E, NR by EB at  1/17/2019 10:30 AM    Comment:  pt alert and Dr Reyes at bedside and discussed plan of care with patient.  No family at bedside.    Acceptance, E,TB, VU by LM at 1/16/2019 11:45 AM    Comment:  Purpose of PT/POC.                               User Key     Initials Effective Dates Name Provider Type Discipline     11/07/16 -  Viv Valente, RN Registered Nurse Nurse     04/03/18 -  Manuela Gomez, PT Physical Therapist PT                Recommendation and Plan  Anticipated Discharge Disposition (PT): inpatient rehabilitation facility  Planned Therapy Interventions (PT Eval): wound care  Therapy Frequency (PT Clinical Impression): 3 times/wk  Plan of Care Reviewed With: patient   Progress: improving   Outcome Summary/Treatment Plan (PT)  Daily Summary of Progress (PT): progress towards functional goals is fair  Plan for Continued Treatment (PT): Cont PT per POC to goals.  Anticipated Discharge Disposition (PT): inpatient rehabilitation facility   Daily Summary of Progress (PT): progress towards functional goals is fair  Plan for Continued Treatment (PT): Cont PT per POC to goals.  Progress: improving  Outcome Summary: PT wound eval completed and wound vac applied to R heel wound.See care map for details. Cont PT per POC.  Plan of Care Reviewed With: patient      Outcome Measures     Row Name 01/17/19 1340 01/17/19 1336 01/16/19 1111       How much help from another person do you currently need...    Turning from your back to your side while in flat bed without using bedrails?  --  1  -LM  --    Moving from lying on back to sitting on the side of a flat bed without bedrails?  --  1  -LM  --    Moving to and from a bed to a chair (including a wheelchair)?  --  1  -LM  --    Standing up from a chair using your arms (e.g., wheelchair, bedside chair)?  --  1  -LM  --    Climbing 3-5 steps with a railing?  --  1  -LM  --    To walk in hospital room?  --  1  -LM  --    AM-PAC 6 Clicks Score  --  6  -LM  --       How much  help from another is currently needed...    Putting on and taking off regular lower body clothing?  1  -SD  --  1  -SD    Bathing (including washing, rinsing, and drying)  1  -SD  --  1  -SD    Toileting (which includes using toilet bed pan or urinal)  1  -SD  --  1  -SD    Putting on and taking off regular upper body clothing  2  -SD  --  1  -SD    Taking care of personal grooming (such as brushing teeth)  2  -SD  --  1  -SD    Eating meals  1  -SD  --  1  -SD    Score  8  -SD  --  6  -SD       Functional Assessment    Outcome Measure Options  AM-PAC 6 Clicks Daily Activity (OT)  -SD  AM-PAC 6 Clicks Basic Mobility (PT)  -LM  AM-PAC 6 Clicks Daily Activity (OT)  -SD    Row Name 01/16/19 1109             How much help from another person do you currently need...    Turning from your back to your side while in flat bed without using bedrails?  1  -LM      Moving from lying on back to sitting on the side of a flat bed without bedrails?  1  -LM      Moving to and from a bed to a chair (including a wheelchair)?  1  -LM      Standing up from a chair using your arms (e.g., wheelchair, bedside chair)?  1  -LM      Climbing 3-5 steps with a railing?  1  -LM      To walk in hospital room?  1  -LM      AM-PAC 6 Clicks Score  6  -LM         Functional Assessment    Outcome Measure Options  AM-PAC 6 Clicks Basic Mobility (PT)  -LM        User Key  (r) = Recorded By, (t) = Taken By, (c) = Cosigned By    Initials Name Provider Type    Manuela Acevedo, MIC Physical Therapist    Hannah Williamson, OT Occupational Therapist            Time Calculation  PT Charges     Row Name 01/18/19 1234             Time Calculation    Start Time  0901  -LM      PT Received On  01/18/19  -LM      PT Goal Re-Cert Due Date  01/28/19  -LM        User Key  (r) = Recorded By, (t) = Taken By, (c) = Cosigned By    Initials Name Provider Type    Manuela Acevedo, MIC Physical Therapist        Therapy Suggested Charges     Code   Minutes Charges    None              Therapy Charges for Today     Code Description Service Date Service Provider Modifiers Qty    [id number]  PT THERAPEUTIC ACT EA 15 MIN 1/17/2019 Manuela Gomez, PT GP 1    [id number]  PT NEG PRESS WOUND TO 50SQCM DME1 1/18/2019 Manuela Gomez, PT  1            PT G-Codes  Outcome Measure Options: AM-PAC 6 Clicks Daily Activity (OT)  AM-PAC 6 Clicks Score: 6  Score: 8       Manuela Gomez, PT  1/18/2019

## 2019-01-18 NOTE — PLAN OF CARE
Problem: Skin and Soft Tissue Infection (Adult)  Goal: Signs and Symptoms of Listed Potential Problems Will be Absent, Minimized or Managed (Skin and Soft Tissue Infection)  Outcome: Ongoing (interventions implemented as appropriate)   01/16/19 1746   Goal/Outcome Evaluation   Problems Assessed (Skin and Soft Tissue Infection) all   Problems Present (Skin/Soft Tissue Inf) infection progression       Problem: Wound (Includes Pressure Injury) (Adult)  Goal: Signs and Symptoms of Listed Potential Problems Will be Absent, Minimized or Managed (Wound)  Outcome: Ongoing (interventions implemented as appropriate)   01/18/19 0231   Goal/Outcome Evaluation   Problems Assessed (Wound) infection   Problems Present (Wound) infection

## 2019-01-18 NOTE — PROGRESS NOTES
Continued Stay Note  [facility]     Patient Name: Millicent Mckeon  MRN: [id number]  Today's Date: 1/18/2019    Admit Date: 1/10/2019    Discharge Plan     Row Name 01/18/19 1615       Plan    Plan Comments [facility] has accepted pt. They can take her over the weekend, or Monday, whenever the doctor clears her medically. Carla ([phone]) needs to be called and updated on what day pt will be discharging to [place]. Pt is agreeable to go.         Discharge Codes    No documentation.       Expected Discharge Date and Time     Expected Discharge Date Expected Discharge Time    Jan 21, 2019             HERB Cordoba  4:17 PM  01/18/19

## 2019-01-18 NOTE — PLAN OF CARE
Problem: Patient Care Overview  Goal: Plan of Care Review  Outcome: Ongoing (interventions implemented as appropriate)   01/18/19 1242   Coping/Psychosocial   Plan of Care Reviewed With patient   Plan of Care Review   Progress improving   OTHER   Outcome Summary Better participation with PT this date. Able to sit EOB x 15 minutes. Cont PT to goals.

## 2019-01-18 NOTE — NURSING NOTE
PT HAS NOT URINATED SINCE F/C REMOVAL AT [facility] TODAY. BLADDER SCAN REVEALED 365ML AND PT UNABLE TO URINATE AT THIS TIME. DR CAMPBELL CALLED NOTIFIED. ORDER TO STRAIGHT CATH NOTED. PT TOLERATED IN AND OUT CATH WELL. ATTEMPTED X 1. STERILE FIELD MAINTAINED. 400ML STRAW COLORED URINE NOTED.

## 2019-01-18 NOTE — DISCHARGE PLACEMENT REQUEST
"  Referral for rehab  Cookie  [phone]    Tatiana Mckeon (58 y.o. Female)     Date of Birth Social Security Number Address Home Phone MRN    1960  [address] [place] [address] [phone] [id number]    Yazdanism Marital Status          Unknown Single       Admission Date Admission Type Admitting Provider Attending Provider Department, Room/Bed    1/10/19 Urgent Milad Leone MD, Samson Ureña MD [facility] MED SURG  4, 403/1    Discharge Date Discharge Disposition Discharge Destination                       Attending Provider:  Samson Reyes MD    Allergies:  Metformin And Related    Isolation:  Contact   Infection:  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)   Code Status:  CPR    Ht:  165.1 cm (65\")   Wt:  145 kg (320 lb 3 oz)    Admission Cmt:  None   Principal Problem:  Cellulitis of both lower extremities [L03.115,L03.116]                 Active Insurance as of 1/10/2019     Primary Coverage     Payor Plan Insurance Group Employer/Plan Group    MEDICARE MEDICARE A & B      Payor Plan Address Payor Plan Phone Number Payor Plan Fax Number Effective Dates    [address] [phone]  12/1/1998 - None Entered    [facility] [id number]       Subscriber Name Subscriber Birth Date Member ID       TATIANA MCKEON 1960 [id number]           Secondary Coverage     Payor Plan Insurance Group Employer/Plan Group    [place]      Payor Plan Address Payor Plan Phone Number Payor Plan Fax Number Effective Dates    [address] [phone]  10/3/2018 - None Entered    [place] [address]       Subscriber Name Subscriber Birth Date Member ID       TATIANA MCKEON 1960 [id number]                 Emergency Contacts      (Rel.) Home Phone Work Phone Mobile Phone    Lovely Mckeon (Sister) [phone] -- --    SUNDAY CHACON (Other) -- -- [phone]            Insurance Information                MEDICARE/MEDICARE A & B Phone: [phone]    Subscriber: " MikeEddiema LUCRECIA Subscriber#: [id number]    Group#:  Precert#:         [place] Phone: [phone]    Subscriber: Millicent Mckeon Subscriber#: [id number]    Group#:  Precert#:           Problem List           Codes Noted - Resolved       Hospital    Acute metabolic encephalopathy ICD-10-CM: G93.41  ICD-9-CM: 348.31 2019 - Present    Essential hypertension ICD-10-CM: I10  ICD-9-CM: 401.9 1/10/2019 - Present    Hypothermia ICD-10-CM: T68.XXXA  ICD-9-CM: 991.6 1/10/2019 - Present    Hypothyroidism ICD-10-CM: E03.9  ICD-9-CM: 244.9 1/10/2019 - Present    Type 2 diabetes mellitus with complication (CMS/HCC) ICD-10-CM: E11.8  ICD-9-CM: 250.90 1/10/2019 - Present    Vitamin B12 deficiency ICD-10-CM: E53.8  ICD-9-CM: 266.2 1/10/2019 - Present    * (Principal) Cellulitis of both lower extremities ICD-10-CM: L03.115, L03.116  ICD-9-CM: 682.6 1/10/2019 - Present    Acute on chronic renal failure (CMS/HCC) ICD-10-CM: N17.9, N18.9  ICD-9-CM: 584.9, 585.9 2019 - Present    Hyperkalemia ICD-10-CM: E87.5  ICD-9-CM: 276.7 2019 - Present    Anemia ICD-10-CM: D64.9  ICD-9-CM: 285.9 10/2/2018 - Present    Cellulitis of lower extremity ICD-10-CM: L03.119  ICD-9-CM: 682.6 10/2/2018 - Present    Bilateral edema of lower extremity ICD-10-CM: R60.0  ICD-9-CM: 782.3 2016 - Present    GERD (gastroesophageal reflux disease) ICD-10-CM: K21.9  ICD-9-CM: 530.81 2013 - Present       Non-Hospital    Altered mental status ICD-10-CM: R41.82  ICD-9-CM: 780.97 2019 - Present          Emergency Department Notes     No notes of this type exist for this encounter.           Physician Progress Notes (last 72 hours) (Notes from 1/15/2019 10:30 AM through 2019 10:30 AM)      Samson Reyes MD at 2019  4:57 PM                [facility]    PROGRESS NOTE    Name:  Millicent Mckeon   Age:  58 y.o.  Sex:  female  :  1960  MRN:  [id number]   Visit Number:   [id number]  Admission Date:  1/10/2019  Date Of Service:  01/17/19  Primary Care Physician:  Rosa Zamora MD     LOS: 7 days :  Patient Care Team:  Rosa Zamora MD as PCP - General (Internal Medicine)  Geremias Shepherd MD as Consulting Physician (General Surgery):    Chief Complaint:      Follow-up of altered mental status, hypothermia and cellulitis.    Subjective / Interval History:     Ms. Mckeon is currently lying down on the bed and is more alert compared to yesterday.  She was able to obey commands today.  She was seen by speech therapy and they have recommended puréed diet with nectar thick liquids.  Unfortunately patient had some nausea this morning and had a couple of episodes of vomiting requiring Zofran and Phenergan.  Her NG tube was pulled out in the afternoon.  No significant overnight events.    She was transferred from [facility] emergency room on 1/10/2019 with symptoms of generalized weakness, altered mental status, shortness of breath and leg cellulitis.  She was noted to have hypothermia and hypotension and was placed on warming blanket on presentation.  She was placed on broad-spectrum IV antibiotic therapy with Zosyn and vancomycin and a consultation was obtained from Dr. Rueda from podiatry.  She was also noted to have acute renal failure and has been followed by Dr. Leone.  According to the sister Virginia, patient was in her normal state of health about 2 weeks ago since when she started having generalized weakness and worsening leg swelling.  She has morbid obesity and apparently uses a wheelchair.  She has had another visit to the emergency room recently and was evaluated with CAT scan of the chest and abdomen and was told to have multiple lymph nodes.  She was subsequently recommended to go to [facility] for further evaluation of these lymph nodes.    Patient did undergo right foot surgical debridement by Dr. Rueda on 1/14/2019.  Patient  was also started on temporary hemodialysis on 1/15/2019.  After foot surgery, patient started improving slowly with regards to her mental status.  Patient was on tube feeds which he has tolerated well.    Review of Systems:     I am unable to obtain complete review of systems due to her altered mental status.  She denies any pain at this time.    Vital Signs:    Temp:  [97.2 °F (36.2 °C)-97.6 °F (36.4 °C)] 97.6 °F (36.4 °C)  Heart Rate:  [59-73] 73  Resp:  [11-18] 14  BP: (142-160)/(65-86) 160/86    Intake and output:    I/O last 3 completed shifts:  In: 2451 [P.O.:60; I.V.:681; Other:864; NG/GT:796; IV Piggyback:50]  Out: 6670 [Urine:2170; Other:4500]  I/O this shift:  In: 156 [I.V.:156]  Out: 680 [Urine:680]    Physical Examination:    General Appearance:  Alert and obeying commands, not in any acute distress.   Head:  Atraumatic and normocephalic, without obvious abnormality.   Eyes:          PERRLA, conjunctivae and sclerae normal, no Icterus. No pallor. Doll's eye movement present.     Neck: Supple, short neck, trachea midline, no thyromegaly, no carotid bruit.   Lungs:   Chest shape is normal. Breath sounds decreased bilaterally due to thick chest wall.  No wheezing.  Bilateral scattered crackles heard. No Pleural rub or bronchial breathing.   Heart:  Normal S1 and S2, no murmur, no gallop, no rub. No JVD   Abdomen:   Normal bowel sounds, no masses, no organomegaly. Soft, non-tender, obese with a large pannus with healing ulcers noted on the skin.   Extremities: Large lower extremities due to obesity with multiple skin folds that are indurated and hyperemic.  Superficial skin ulcer noted on the lateral aspect of the right leg without any purulent discharge.  Surgical bandage is noted on the right foot.  Wound noted on the medial aspect of the right thigh with purulent slough.  Excoriations noted on bilateral legs and feet.     Skin: As described above.  Please see nurse's notes and the photographs in the chart  for further details.     Neurologic: Alert and obeying commands.  She was able to squeeze the hands.  Not very cooperative to check for motor power.     Laboratory results:    Results from last 7 days   Lab Units 01/17/19  0428 01/16/19  0537 01/15/19  0416 01/14/19  0414 01/13/19 0423 01/11/19  0419   SODIUM mmol/L 141 138 142 138 138   < > 135*   POTASSIUM mmol/L 3.5 3.9 4.2 4.2 4.8   < > 5.7*   CHLORIDE mmol/L 109* 109* 109* 109* 108*   < > 106   CO2 mmol/L 22.0* 25.0* 19.0* 19.0* 18.0*   < > 19.0*   BUN mg/dL 61* 77* 98* 82* 73*   < > 50*   CREATININE mg/dL 2.10* 2.70* 3.70* 4.00* 3.80*   < > 3.50*   CALCIUM mg/dL 8.4 7.8* 7.7* 7.9* 8.1*   < > 8.0*   BILIRUBIN mg/dL  --   --   --  0.4 0.4  --  0.4   ALK PHOS U/L  --   --   --  272* 323*  --  305*   ALT (SGPT) U/L  --   --   --  27 34  --  37   AST (SGOT) U/L  --   --   --  16 21  --  28   GLUCOSE mg/dL 208* 253* 220* 181* 202*   < > 139*    < > = values in this interval not displayed.     Results from last 7 days   Lab Units 01/17/19  0428 01/16/19  0537 01/15/19  0418   WBC 10*3/mm3 11.00* 13.99* 19.82*   HEMOGLOBIN g/dL 8.2* 8.4* 8.1*   HEMATOCRIT % 26.8* 26.8* 26.2*   PLATELETS 10*3/mm3 108* 79* 83*     Results from last 7 days   Lab Units 01/13/19  0901 01/10/19  2112   INR  1.41* 1.57*     Results from last 7 days   Lab Units 01/12/19  0431 01/10/19  2110   CK TOTAL U/L 40 35   TROPONIN I ng/mL  --  0.018     Results from last 7 days   Lab Units 01/11/19  0744 01/11/19  0735 01/10/19  2318 01/10/19  2313   BLOODCX  No growth at 5 days No growth at 5 days  --   --    URINECX   --   --   --  Mixed Chrissie Isolated   MRSA SCREEN CX   --   --  No Methicillin Resistant Staphylococcus aureus isolated  --      I have reviewed the patient's laboratory results.    Radiology results:    Imaging Results (last 24 hours)     ** No results found for the last 24 hours. **        Medication Review:     I have reviewed the patients active and prn medications.        Cellulitis of both lower extremities    Anemia    Bilateral edema of lower extremity    Cellulitis of lower extremity    Acute on chronic renal failure (CMS/HCC)    GERD (gastroesophageal reflux disease)    Hyperkalemia    Essential hypertension    Hypothermia    Hypothyroidism    Type 2 diabetes mellitus with complication (CMS/[facility])    Vitamin B12 deficiency    Acute metabolic encephalopathy    Assessment:    1.  Sepsis with hypothermia, present on admission, improving.  2.  Bilateral lower extremity cellulitis, present on admission.  3.  Acute metabolic encephalopathy, present on admission, improving.  4.  Acute renal failure, present on admission.  5.  Hyperkalemia, present on admission, improved.  6.  Acquired hypothyroidism, uncontrolled.  7.  Diabetes mellitus type 2 with nephropathy.  8.  Morbid obesity with a BMI of 63.  9.  Chronic venous insufficiency of the lower extremities.  10. Retrocrural, paratracheal, retro-peritoneal, bilateral inguinal lymphadenopathy, uncertain etiology.  11.  Left thyroid nodule 2 cm on recent CT scan.  12.  Thrombocytopenia likely secondary to sepsis.    Plan:    Ms. Mckeon continues to improve with regards to her encephalopathy.  Her sepsis has improved as well.  She is currently on Invanz and vancomycin.  Her nasal swab for MRSA has been negative.  Blood cultures have been negative so far.  I will discontinue vancomycin.  She is being followed by Dr. Leone and her renal function is slowly improving after dialysis.  Wound culture is growing ESBL Escherichia coli and she is on Invanz.  Her heparin DVT prophylaxis was discontinued due to thrombocytopenia and it is slowly improving.  Hopefully, we can restart her back on heparin tomorrow.      Patient will be continued on physical and occupational therapy.  We will transfer her to the medical floor with telemetry today.  We will continue her on puréed diet as tolerated.    Patient's recent CT scan of the abdomen and pelvis done  at [facility] emergency room noted several lymphadenopathy especially in the retroperitoneal and bilateral inguinal regions as well as paratracheal and retrocrural. Our initial CT scan reports done here did not mention these findings but I discussed with our radiologist on 1/14/2019 and he reviewed the scans done here and he stated that he does see the lymph nodes in the above-mentioned areas.  He also stated that the patient has a tiny nodule on the left lobe of the thyroid but he did not think it was 2 cm in size.  She was seen by Dr. Theodore from oncology on 1/16/2019.  He recommended outpatient follow-up for her lymphadenopathy and possible outpatient lymph node biopsy.       Samson Reyes MD  01/17/19  4:57 PM    Dictated utilizing Dragon dictation.      Electronically signed by Samson Reyes MD at 1/17/2019  6:14 PM     Ar Rueda DPM at 1/17/2019  4:47 PM              Patient Care Team:  Rosa Zamora MD as PCP - General (Internal Medicine)  Geremias Shepherd MD as Consulting Physician (General Surgery)    Chief complaint right heel    Subjective .   58-year-old female with multiple medical comorbidities 3 days status post right heel wound debridement with graft placement.  Seen at bedside today in the ICU.  Continues to be somewhat more arousable and verbal today.  Seems to be slowly improving day by day.  Denies complaints of pain.  States she's cold.      Review of Systems  All systems were reviewed and negative except for chief complaint.    History  Past Medical History:   Diagnosis Date   • Diabetes mellitus (CMS/HCC)    • Disease of thyroid gland    • GERD (gastroesophageal reflux disease)    • Hypertension    , Past Surgical History:   Procedure Laterality Date   • EYE SURGERY     • INCISION AND DRAINAGE LEG Right 1/14/2019    Procedure: Right heel incision and drainage with graft application;  Surgeon: Ar Rueda DPM;  Location: [facility];  Service: Podiatry   • LEG SURGERY      , Family History   Problem Relation Age of Onset   • Asthma Mother    • Hypertension Father    • Stroke Father    , Social History     Tobacco Use   • Smoking status: Never Smoker   • Smokeless tobacco: Never Used   Substance Use Topics   • Alcohol use: No     Frequency: Never   • Drug use: No   , Medications Prior to Admission   Medication Sig Dispense Refill Last Dose   • ferrous sulfate 324 (65 Fe) MG tablet delayed-release EC tablet Take 324 mg by mouth 2 (Two) Times a Day.      • gabapentin (NEURONTIN) 600 MG tablet Take 600 mg by mouth 3 (Three) Times a Day.      • glipiZIDE (GLUCOTROL) 10 MG tablet Take 20 mg by mouth 2 (Two) Times a Day.      • Insulin Glargine (BASAGLAR KWIKPEN) 100 UNIT/ML injection pen Inject 30 Units under the skin into the appropriate area as directed Every Night.      • meclizine 25 MG chewable tablet chewable tablet Chew 25 mg 3 (Three) Times a Day As Needed (for dizziness or motion sickness).      • allopurinol (ZYLOPRIM) 300 MG tablet Take 300 mg by mouth Daily.      • amLODIPine (NORVASC) 5 MG tablet Take 5 mg by mouth Daily.  0    • esomeprazole (nexIUM) 40 MG capsule Take 40 mg by mouth Every Morning Before Breakfast.  0    • furosemide (LASIX) 20 MG tablet Take 20 mg by mouth 2 (Two) Times a Day.      • RA ASPIRIN EC 81 MG EC tablet Take 81 mg by mouth Daily.  0    • RA VITAMIN C 500 MG tablet Take 500 mg by mouth Daily.  0    • RA VITAMIN D-3 2000 units capsule Take 2,000 Units by mouth Daily.  0    • simvastatin (ZOCOR) 20 MG tablet Take 20 mg by mouth Every Night.  0    • TRADJENTA 5 MG tablet tablet Take 5 mg by mouth Daily.      , Scheduled Meds:    ertapenem 500 mg Intravenous Q24H   famotidine 20 mg Intravenous Daily   hydrALAZINE 25 mg Nasogastric Q8H   hydrocortisone sodium succinate 50 mg Intravenous Q12H   insulin detemir 30 Units Subcutaneous QAM   insulin regular 0-7 Units Subcutaneous Q6H   lactobacillus acidophilus 1 capsule Nasogastric Daily   levothyroxine 100  "mcg Nasogastric Q AM   sodium chloride 3 mL Intravenous Q12H   , Continuous Infusions:    sodium chloride 10 mL/hr Last Rate: 10 mL/hr (01/15/19 0902)   , PRN Meds:  •  acetaminophen  •  CHAPSTICK  •  dextrose  •  dextrose  •  glucagon (human recombinant)  •  glycerin  •  heparin (porcine)  •  hydrALAZINE  •  LORazepam  •  Morphine **AND** naloxone  •  ondansetron **OR** ondansetron ODT **OR** ondansetron  •  promethazine  •  sodium chloride  •  sodium chloride  •  zinc oxide and Allergies:  Metformin and related    Objective     Vital Signs   /69   Pulse 67   Temp 97.5 °F (36.4 °C) (Oral)   Resp 11   Ht 165.1 cm (65\")   Wt (!) 147 kg (323 lb 6.4 oz)   SpO2 94%   BMI 53.82 kg/m²      Physical Exam: She is awake but not to the alert to place and time.  She does recognize her name and is able to follow commands.  Both lower extremities continue to be wrapped with absorbent pads.  Right foot and heel are dressed.  Dressings intact.  No drainage or bleeding or strikethrough.  She is able to plantarflex and dorsiflex her digits with commands.  Feet are warm.  Capillary refill time is brisk.       Results Review: Laboratory data reviewed.  White blood cell count down to 11.  Hemoglobin 8.2.  Hematocrit 26.8.  Neutrophil percent 88.5.  Potassium 3.5.  Creatinine 2.1 area BUN 61.  No change to culture results.    Assessment/Plan   58-year-old morbidly obese diabetic female with end-stage renal disease, bilateral lower extremity lymphedema, right heel wound    Cellulitis of both lower extremities    Anemia    Bilateral edema of lower extremity    Cellulitis of lower extremity    Acute on chronic renal failure (CMS/HCC)    GERD (gastroesophageal reflux disease)    Hyperkalemia    Essential hypertension    Hypothermia    Hypothyroidism    Type 2 diabetes mellitus with complication (CMS/HCC)    Vitamin B12 deficiency    Acute metabolic encephalopathy  Physical therapy has left for the day so we will hope to have " "them place the wound VAC on the right heel wound tomorrow.  We should then be able to begin and continue a Monday, Wednesday, Friday wound VAC change schedule.  I will be out of town the remainder the weekend and return on Monday.  Call me with any questions.        Ar Rueda DPM  01/17/19  4:47 PM        Electronically signed by Ar Rueda DPM at 1/17/2019  4:51 PM     Milad Leone MD, FASN at 1/17/2019  8:56 AM          Nephrology Progress Note.    LOS: 7 days    Patient Care Team:  Rosa Zamora MD as PCP - General (Internal Medicine)  Geremias Shepherd MD as Consulting Physician (General Surgery)    Chief Complaint:  No chief complaint on file.      Subjective     Follow up for FREDDY and related issues.    Interval History:     Patient Complaints: none    Patient seen and examined this morning.  Events from last night noted.  She is doing much better not more alert and interactive.  She is able to communicate keep a conversation, she's been getting to feeding then having persistent diarrhea and multiple bouts of vomiting as well.    Review of Systems:    Patient is unresponsive no meaningful review of system is possible.      Objective     Vital Signs  /67 (BP Location: Right arm, Patient Position: Lying)   Pulse 64   Temp 97.3 °F (36.3 °C) (Oral)   Resp 18   Ht 165.1 cm (65\")   Wt (!) 147 kg (323 lb 6.4 oz)   SpO2 94%   BMI 53.82 kg/m²        No intake/output data recorded.    Intake/Output Summary (Last 24 hours) at 1/17/2019 0856  Last data filed at 1/17/2019 0550  Gross per 24 hour   Intake 1353 ml   Output 5610 ml   Net -4257 ml       Physical Exam:    General Appearance:  no acute distress,   HEENT: Oral mucosa dry, extra occular movements intact. Sclera clear.  Skin: Warm and dry  Neck: supple, no JVD, trachea midline  Lungs:Chest shape is normal. Breath sounds heard bilaterally equally. No crackles, No wheezing.   Heart: regular rate and rhythm. normal S1 and S2, no S3, no " rub, peripheral pulses weak but palpable.  Abdomen: Obese, soft, non-tender,  present bowel sounds to auscultation  : no palpable bladder.  Extremities: 1-2+ edema, no cyanosis or clubbing.   Neuro: Unable to evaluate     Results Review:    Results from last 7 days   Lab Units 01/17/19 0428 01/16/19 0537 01/15/19  0416 01/14/19 0414 01/13/19 0423 01/11/19 0419   SODIUM mmol/L 141 138 142 138 138   < > 135*   POTASSIUM mmol/L 3.5 3.9 4.2 4.2 4.8   < > 5.7*   CHLORIDE mmol/L 109* 109* 109* 109* 108*   < > 106   CO2 mmol/L 22.0* 25.0* 19.0* 19.0* 18.0*   < > 19.0*   BUN mg/dL 61* 77* 98* 82* 73*   < > 50*   CREATININE mg/dL 2.10* 2.70* 3.70* 4.00* 3.80*   < > 3.50*   CALCIUM mg/dL 8.4 7.8* 7.7* 7.9* 8.1*   < > 8.0*   BILIRUBIN mg/dL  --   --   --  0.4 0.4  --  0.4   ALK PHOS U/L  --   --   --  272* 323*  --  305*   ALT (SGPT) U/L  --   --   --  27 34  --  37   AST (SGOT) U/L  --   --   --  16 21  --  28   GLUCOSE mg/dL 208* 253* 220* 181* 202*   < > 139*    < > = values in this interval not displayed.       Estimated Creatinine Clearance: 41.8 mL/min (A) (by C-G formula based on SCr of 2.1 mg/dL (H)).    Results from last 7 days   Lab Units 01/16/19  0537 01/12/19  0431 01/11/19  0419 01/10/19  2110   MAGNESIUM mg/dL 2.5*  --  2.6* 2.5*   PHOSPHORUS mg/dL  --  7.9* 7.4* 6.8*       Results from last 7 days   Lab Units 01/12/19  0431   URIC ACID mg/dL 4.4       Results from last 7 days   Lab Units 01/17/19  0428 01/16/19  0537 01/15/19  0418 01/14/19  0414 01/13/19  0423   WBC 10*3/mm3 11.00* 13.99* 19.82* 18.96* 22.61*   HEMOGLOBIN g/dL 8.2* 8.4* 8.1* 8.2* 8.6*   PLATELETS 10*3/mm3 108* 79* 83* 102* 131       Results from last 7 days   Lab Units 01/13/19  0901 01/10/19  2112   INR  1.41* 1.57*         Imaging Results (last 24 hours)     ** No results found for the last 24 hours. **          ertapenem 500 mg Intravenous Q24H   famotidine 20 mg Intravenous Daily   hydrALAZINE 25 mg Nasogastric Q8H   hydrocortisone  sodium succinate 50 mg Intravenous Q12H   insulin detemir 30 Units Subcutaneous QAM   insulin regular 0-7 Units Subcutaneous Q6H   lactobacillus acidophilus 1 capsule Nasogastric Daily   levothyroxine 100 mcg Nasogastric Q AM   sodium chloride 3 mL Intravenous Q12H       sodium chloride 10 mL/hr Last Rate: 10 mL/hr (01/15/19 0902)       Medication Review:   Current Facility-Administered Medications   Medication Dose Route Frequency Provider Last Rate Last Dose   • acetaminophen (TYLENOL) tablet 650 mg  650 mg Oral Q4H PRN Milad Leone MD, GILA       • CHAPSTICK 1 each  1 each Apply externally Q1H PRN Samson Reyes MD   1 each at 01/16/19 1155   • dextrose (D50W) 25 g/ 50mL Intravenous Solution 25 g  25 g Intravenous Q15 Min PRN Milad Leone MD, FASN       • dextrose (GLUTOSE) oral gel 1 tube  1 tube Oral Q15 Min PRN Milad Leone MD, SHANIN       • ertapenem (INVanz) 500 mg in Sodium chloride 0.9 % 50 mL IVPB  500 mg Intravenous Q24H Samson Reyes  mL/hr at 01/16/19 1640 500 mg at 01/16/19 1640   • famotidine (PEPCID) injection 20 mg  20 mg Intravenous Daily Liz Gregg MD   20 mg at 01/16/19 0835   • glucagon (human recombinant) (GLUCAGEN DIAGNOSTIC) injection 1 mg  1 mg Subcutaneous PRN Milad Leone MD, FASN       • glycerin 35 % liquid 35% 2 spray  2 spray Mouth/Throat Q2H PRN Maribel Wheeler DO   2 spray at 01/16/19 0857   • heparin (porcine) injection 2,400 Units  2,400 Units Intracatheter PRN Milad Leone MD, FASN   2,400 Units at 01/15/19 1502   • hydrALAZINE (APRESOLINE) injection 10 mg  10 mg Intravenous Q6H PRN Liz Gregg MD   10 mg at 01/16/19 0447   • hydrALAZINE (APRESOLINE) tablet 25 mg  25 mg Nasogastric Q8H Samson Reyes MD   25 mg at 01/17/19 0521   • hydrocortisone sodium succinate (Solu-CORTEF) injection 50 mg  50 mg Intravenous Q12H Liz Gregg MD   50 mg at 01/17/19 0034   • insulin detemir (LEVEMIR) injection 30 Units  30 Units Subcutaneous QAM  Samson Reyes MD   30 Units at 01/17/19 0630   • insulin regular (humuLIN R,novoLIN R) injection 0-7 Units  0-7 Units Subcutaneous Q6H Liz Gregg MD   3 Units at 01/17/19 0630   • lactobacillus acidophilus (RISAQUAD) capsule 1 capsule  1 capsule Nasogastric Daily Samson Reyes MD   1 capsule at 01/16/19 0835   • levothyroxine (SYNTHROID, LEVOTHROID) tablet 100 mcg  100 mcg Nasogastric Q AM Samson Reyes MD   100 mcg at 01/17/19 0521   • LORazepam (ATIVAN) injection 0.5 mg  0.5 mg Intravenous Q6H PRN Samson Reyes MD       • Morphine sulfate (PF) injection 2 mg  2 mg Intravenous Q4H PRN Milad Leone MD, FASN        And   • naloxone (NARCAN) injection 0.4 mg  0.4 mg Intravenous Q5 Min PRN Milad Leone MD, FASN       • ondansetron (ZOFRAN) tablet 4 mg  4 mg Oral Q6H PRN Milad Leone MD, FASN        Or   • ondansetron ODT (ZOFRAN-ODT) disintegrating tablet 4 mg  4 mg Oral Q6H PRN Milad Leone MD, FASN        Or   • ondansetron (ZOFRAN) injection 4 mg  4 mg Intravenous Q6H PRN Milad Leone MD, FASN       • sodium chloride 0.9 % flush 1-10 mL  1-10 mL Intravenous PRN Milad Leone MD, FASN       • sodium chloride 0.9 % flush 3 mL  3 mL Intravenous Q12H Milad Leone MD, FASN   3 mL at 01/16/19 2106   • Sodium chloride 0.9 % infusion  10 mL/hr Intravenous Continuous Milad Leone MD, FASN 10 mL/hr at 01/15/19 0902 10 mL/hr at 01/15/19 0902   • zinc oxide 13 % cream   Topical BID PRN Milad Leone MD, FASN   1 application at 01/12/19 9424       Assessment/Plan         1.   Acute on chronic renal failure (CMS/HCC): It is likely secondary to hypotensive episodes as well as the use of Bactrim.  There is possibility that she may be taking nonsteroidals and other hemodynamic abnormality is causing the current problem.  There is no bloody around at this time who can help make the decisions or tell us what was done at home.  Finally I have came to know about that she has a blind sister both of them  try to work together to take care of each other.  Her sister is on dialysis and is my patient.  2.   Cellulitis of both lower extremities: Continue with the IV antibiotics.  She likely has an abscess on the foot that needs to be addressed  3.   Altered mental status: There is some improvement in her mental status.  4.   Anemia: Transfused as needed  5.   Bilateral edema of lower extremity:  she needs a central line placed the temporary dialysis catheter and use it as a central line and likely will need dialysis in the morning.  6.   GERD (gastroesophageal reflux disease)  7.   Hyperkalemia:  it is resolved at this point  8.   Hypertension: Under fair control  9.   Hypothermia  10.   Hypothyroidism  11.   Type 2 diabetes mellitus with complication (CMS/[facility])  12.   Vitamin B12 deficiency    Plan:    · I'll hold off dialysis today and see how she'll respond to a day without dialysis.  Continue reassess on day-to-day basis.  · Blood pressure is fairly stable with the hydralazine can be continued.  · Clinically starting to improve.  · Details were also discussed with the hospitalist service.    · Surveillance labs.  · Further recommendations will depend on clinical course of the patient during the current hospitalization.    · I also discussed the details with the nursing staff.  · Rest as ordered.    Milad Leone MD, GILA  19  8:56 AM    Dictated utilizing Dragon dictation.    Electronically signed by Milad Leone MD, GILA at 2019 11:07 AM     Samson Reyes MD at 2019  7:59 PM                [facility]    PROGRESS NOTE    Name:  Millicent Mckeon   Age:  58 y.o.  Sex:  female  :  1960  MRN:  [id number]   Visit Number:  [id number]  Admission Date:  1/10/2019  Date Of Service:  19  Primary Care Physician:  Rosa Zamora MD     LOS: 6 days :  Patient Care Team:  Rosa Zamora MD as PCP - General (Internal Medicine)  Geremias Shepherd MD as Consulting Physician  (General Surgery):    Chief Complaint:      Altered mental status, hypothermia and cellulitis.    Subjective / Interval History:     Ms. Mckeon is currently lying down on the bed and is more alert compared to yesterday.  She was able to obey commands today.  She is currently tolerating tube feeds.  She did undergo hemodialysis yesterday and will be getting another hemodialysis today.  Her temperature has been stable without any further episodes of hypothermia.  No significant overnight events.    She was transferred from [facility] emergency room on 1/10/2019 with symptoms of generalized weakness, altered mental status, shortness of breath and leg cellulitis.  She was noted to have hypothermia and hypotension and was placed on warming blanket on presentation.  She was placed on broad-spectrum IV antibiotic therapy with Zosyn and vancomycin and a consultation was obtained from Dr. Rueda from podiatry.  She was also noted to have acute renal failure and has been followed by Dr. Leone.  According to the sister Virginia, patient was in her normal state of health about 2 weeks ago since when she started having generalized weakness and worsening leg swelling.  She has morbid obesity and apparently uses a wheelchair.  She has had another visit to the emergency room recently and was evaluated with CAT scan of the chest and abdomen and was told to have multiple lymph nodes.  She was subsequently recommended to go to [facility] for further evaluation of these lymph nodes.    According to the sister, who is the power of , patient has had progressive decline in her general health associated with generalized weakness in the last one week necessary dating the visit to the emergency room where they found her to have hypothermia.  Since admission to the ICU he had [facility], patient has not improved much with regards to her mental status.  Initial CT of the head was  negative for any acute abnormalities.  Patient's body habitus is too large for the MRI scan.  There has been no history of any tonic-clonic convulsions.      Review of Systems:     I am unable to obtain complete review of systems due to her altered mental status.  She denies any pain at this time.    Vital Signs:    Temp:  [97.6 °F (36.4 °C)-98.6 °F (37 °C)] 98.1 °F (36.7 °C)  Heart Rate:  [64-82] 65  Resp:  [15-16] 15  BP: (138-164)/(58-79) 144/66    Intake and output:    I/O last 3 completed shifts:  In: 2991 [P.O.:90; I.V.:526; Other:1133; NG/GT:1192; IV Piggyback:50]  Out: 54678 [Urine:3615; Other:6500]  No intake/output data recorded.    Physical Examination:    General Appearance:  Alert and obeying commands, not in any acute distress.   Head:  Atraumatic and normocephalic, without obvious abnormality.   Eyes:          PERRLA, conjunctivae and sclerae normal, no Icterus. No pallor. Doll's eye movement present.     Neck: Supple, short neck, trachea midline, no thyromegaly, no carotid bruit.   Lungs:   Chest shape is normal. Breath sounds decreased bilaterally due to thick chest wall.  No wheezing.  Bilateral scattered crackles heard. No Pleural rub or bronchial breathing.   Heart:  Normal S1 and S2, no murmur, no gallop, no rub. No JVD   Abdomen:   Normal bowel sounds, no masses, no organomegaly. Soft, non-tender, obese with a large pannus with healing ulcers noted on the skin.   Extremities: Large lower extremities due to obesity with multiple skin folds that are indurated and hyperemic.  Superficial skin ulcer noted on the lateral aspect of the right leg without any purulent discharge.  Black discoloration of the right heel on the lateral aspect noted.  Wound noted on the medial aspect of the right thigh with purulent slough.  Excoriations noted on bilateral legs and feet.     Skin: As described above.  Please see nurse's notes and the p the chart for further details.     Neurologic: Alert and obeying  commands.  She was able to squeeze the hands.  Not very cooperative to check for motor power.     Laboratory results:    Results from last 7 days   Lab Units 01/16/19  0537 01/15/19  0416 01/14/19 0414 01/13/19  0423  01/11/19  0419   SODIUM mmol/L 138 142 138 138   < > 135*   POTASSIUM mmol/L 3.9 4.2 4.2 4.8   < > 5.7*   CHLORIDE mmol/L 109* 109* 109* 108*   < > 106   CO2 mmol/L 25.0* 19.0* 19.0* 18.0*   < > 19.0*   BUN mg/dL 77* 98* 82* 73*   < > 50*   CREATININE mg/dL 2.70* 3.70* 4.00* 3.80*   < > 3.50*   CALCIUM mg/dL 7.8* 7.7* 7.9* 8.1*   < > 8.0*   BILIRUBIN mg/dL  --   --  0.4 0.4  --  0.4   ALK PHOS U/L  --   --  272* 323*  --  305*   ALT (SGPT) U/L  --   --  27 34  --  37   AST (SGOT) U/L  --   --  16 21  --  28   GLUCOSE mg/dL 253* 220* 181* 202*   < > 139*    < > = values in this interval not displayed.     Results from last 7 days   Lab Units 01/16/19  0537 01/15/19  0418 01/14/19  0414   WBC 10*3/mm3 13.99* 19.82* 18.96*   HEMOGLOBIN g/dL 8.4* 8.1* 8.2*   HEMATOCRIT % 26.8* 26.2* 26.5*   PLATELETS 10*3/mm3 79* 83* 102*     Results from last 7 days   Lab Units 01/13/19  0901 01/10/19  2112   INR  1.41* 1.57*     Results from last 7 days   Lab Units 01/12/19  0431 01/10/19  2110   CK TOTAL U/L 40 35   TROPONIN I ng/mL  --  0.018     Results from last 7 days   Lab Units 01/11/19  0744 01/11/19  0735 01/10/19  2318 01/10/19  2313   BLOODCX  No growth at 5 days No growth at 5 days  --   --    URINECX   --   --   --  Mixed Chrissie Isolated   MRSA SCREEN CX   --   --  No Methicillin Resistant Staphylococcus aureus isolated  --      I have reviewed the patient's laboratory results.    Radiology results:    Imaging Results (last 24 hours)     ** No results found for the last 24 hours. **        Medication Review:     I have reviewed the patients active and prn medications.       Cellulitis of both lower extremities    Anemia    Bilateral edema of lower extremity    Cellulitis of lower extremity    Acute on  chronic renal failure (CMS/HCC)    GERD (gastroesophageal reflux disease)    Hyperkalemia    Essential hypertension    Hypothermia    Hypothyroidism    Type 2 diabetes mellitus with complication (CMS/HCC)    Vitamin B12 deficiency    Acute metabolic encephalopathy    Assessment:    1.  Sepsis with hypothermia, present on admission, improving.  2.  Bilateral lower extremity cellulitis, present on admission.  3.  Acute metabolic encephalopathy, present on admission, improving.  4.  Acute renal failure, present on admission.  5.  Hyperkalemia, present on admission, improved.  6.  Acquired hypothyroidism, uncontrolled.  7.  Diabetes mellitus type 2 with nephropathy.  8.  Morbid obesity with a BMI of 63.  9.  Chronic venous insufficiency of the lower extremities.  10. Retrocrural, paratracheal, retro-peritoneal, bilateral inguinal lymphadenopathy, uncertain etiology.  11.  Left thyroid nodule 2 cm on recent CT scan.  12.  Thrombocytopenia likely secondary to sepsis.    Plan:    Ms. Mckeon continues to improve with regards to her encephalopathy.  Her renal function is slightly better with creatinine at 2.7.  She is being followed by Dr. Leone and I have discussed the condition with him.  She will be dialyzed again today.      Patient will be continued on broad-spectrum IV antibiotic therapy with Zosyn and vancomycin.  Blood cultures have been negative so far.  Wound culture is growing ESBL Escherichia coli and she will be placed over to [facility].  She does have thrombocytopenia and we will discontinue heparin therapy at this time.  Her initial CT of the head did not show any new findings but showed chronic microvascular disease.    Patient's recent CT scan of the abdomen and pelvis done at [facility] emergency room noted several lymphadenopathy especially in the retroperitoneal and bilateral inguinal regions as well as paratracheal and retrocrural. Our initial CT scan reports done here did not mention these  findings but I discussed with our radiologist on 1/14/2019 and he reviewed the scans done here and he stated that he does see the lymph nodes in the above-mentioned areas.  He also stated that the patient has a tiny nodule on the left lobe of the thyroid but he did not think it was 2 cm in size.  She was seen by Dr. Theodore from oncology today.  I discussed the patient's treatment plan with him.  He recommended outpatient follow-up for her lymphadenopathy and possible outpatient lymph node biopsy.     She will be continued to be monitored in the ICU today.  Hopefully should be able to transfer to the medical floor with telemetry tomorrow.  She will be continued on physical and occupational therapy.  She was also seen by speech therapy and they have recommended to continue nothing by mouth with tube feedings at this time.      Samson Reyes MD  01/16/19  7:59 PM    Dictated utilizing Dragon dictation.      Electronically signed by Samson Reyes MD at 1/16/2019  8:05 PM     Ar Rueda DPM at 1/16/2019 12:17 PM              Patient Care Team:  Rosa Zamora MD as PCP - General (Internal Medicine)  Geremias Shepherd MD as Consulting Physician (General Surgery)    Chief complaint right heel    Subjective .   58-year-old morbidly obese diabetic female 2 days status post debridement of a right heel wound.  She seen in the ICU today.  Still has the NG tube but she sitting up.  She actually tries to respond somewhat to my questions and is showing improved cognition.  She is able to respond to some of my requests and commands.  She is able to move her toes as well as squeeze my fingers upon request.      Review of Systems  All systems were reviewed and negative except for chief complaint.    History  Past Medical History:   Diagnosis Date   • Diabetes mellitus (CMS/HCC)    • Disease of thyroid gland    • GERD (gastroesophageal reflux disease)    • Hypertension    ,   Past Surgical History:   Procedure Laterality Date   •  EYE SURGERY     • INCISION AND DRAINAGE LEG Right 1/14/2019    Procedure: Right heel incision and drainage with graft application;  Surgeon: Ar Rueda DPM;  Location: [facility];  Service: Podiatry   • LEG SURGERY     ,   Family History   Problem Relation Age of Onset   • Asthma Mother    • Hypertension Father    • Stroke Father    ,   Social History     Tobacco Use   • Smoking status: Never Smoker   • Smokeless tobacco: Never Used   Substance Use Topics   • Alcohol use: No     Frequency: Never   • Drug use: No   ,   Medications Prior to Admission   Medication Sig Dispense Refill Last Dose   • ferrous sulfate 324 (65 Fe) MG tablet delayed-release EC tablet Take 324 mg by mouth 2 (Two) Times a Day.      • gabapentin (NEURONTIN) 600 MG tablet Take 600 mg by mouth 3 (Three) Times a Day.      • glipiZIDE (GLUCOTROL) 10 MG tablet Take 20 mg by mouth 2 (Two) Times a Day.      • Insulin Glargine (BASAGLAR KWIKPEN) 100 UNIT/ML injection pen Inject 30 Units under the skin into the appropriate area as directed Every Night.      • meclizine 25 MG chewable tablet chewable tablet Chew 25 mg 3 (Three) Times a Day As Needed (for dizziness or motion sickness).      • allopurinol (ZYLOPRIM) 300 MG tablet Take 300 mg by mouth Daily.      • amLODIPine (NORVASC) 5 MG tablet Take 5 mg by mouth Daily.  0    • esomeprazole (nexIUM) 40 MG capsule Take 40 mg by mouth Every Morning Before Breakfast.  0    • furosemide (LASIX) 20 MG tablet Take 20 mg by mouth 2 (Two) Times a Day.      • RA ASPIRIN EC 81 MG EC tablet Take 81 mg by mouth Daily.  0    • RA VITAMIN C 500 MG tablet Take 500 mg by mouth Daily.  0    • RA VITAMIN D-3 2000 units capsule Take 2,000 Units by mouth Daily.  0    • simvastatin (ZOCOR) 20 MG tablet Take 20 mg by mouth Every Night.  0    • TRADJENTA 5 MG tablet tablet Take 5 mg by mouth Daily.      , Scheduled Meds:      ampicillin-sulbactam 3 g Intravenous Q24H   famotidine 20 mg Intravenous Daily   hydrALAZINE 25  "mg Nasogastric Q8H   hydrocortisone sodium succinate 50 mg Intravenous Q12H   insulin detemir 20 Units Subcutaneous QAM   insulin regular 0-7 Units Subcutaneous Q6H   lactobacillus acidophilus 1 capsule Nasogastric Daily   levothyroxine 100 mcg Nasogastric Q AM   sodium chloride 3 mL Intravenous Q12H   vancomycin 1,000 mg Intravenous Once   , Continuous Infusions:      Pharmacy Consult     Sodium chloride 10 mL/hr Last Rate: 10 mL/hr (01/15/19 0902)   , PRN Meds:  •  acetaminophen  •  CHAPSTICK  •  dextrose  •  dextrose  •  glucagon (human recombinant)  •  glycerin  •  heparin (porcine)  •  hydrALAZINE  •  LORazepam  •  Morphine **AND** naloxone  •  ondansetron **OR** ondansetron ODT **OR** ondansetron  •  Pharmacy Consult  •  sodium chloride  •  vancomycin  •  zinc oxide and Allergies:  Metformin and related    Objective     Vital Signs   /65 (BP Location: Right arm, Patient Position: Sitting)   Pulse 77   Temp 98.2 °F (36.8 °C) (Oral)   Resp 15   Ht 165.1 cm (65\")   Wt (!) 153 kg (336 lb 12.8 oz)   SpO2 95%   BMI 56.05 kg/m²      Physical Exam: Awake and currently not oriented to person place and time.  Dressing to the right foot is intact.  There is no bleeding or strikethrough presently.  She is able to plantarflex and dorsiflex her digits with commands.  Capillary refill time to the digits is brisk.         Results Review: Wound cultures are positive for ESBL producing Klebsiella as well as Proteus from varus.      Assessment/Plan   58-year-old morbidly obese diabetic female with multiple medical comorbidities including end-stage renal disease, bilateral lower extremity lymphedema/cellulitis, anemia.  2 days status post wound debridement and graft placement of the right heel wound.      Cellulitis of both lower extremities    Anemia    Bilateral edema of lower extremity    Cellulitis of lower extremity    Acute on chronic renal failure (CMS/HCC)    GERD (gastroesophageal reflux disease)    " Hyperkalemia    Essential hypertension    Hypothermia    Hypothyroidism    Type 2 diabetes mellitus with complication (CMS/HCC)    Vitamin B12 deficiency    Acute metabolic encephalopathy  Her drainage and bleeding from the wound seems to be slowing down.  Physical and be very difficult to completely stop or manage given that she is on heparin every 8 hours.  I discussed that wound VAC application with physical therapy yesterday evening and we may have to wait an additional day or 2 before applying the wound VAC, or at least until her anticoagulation has some hopefully slow down our dosage decreased.  Her cognition seems to be improving and I'm hopeful maybe within the next day or 2 she is able to communicate more.  All her other medical comorbidities are being managed per the hospitalist service including her antibiotics.  Now that we have more definitive culture results we should be able to treat her accordingly.  I also think to hemodialysis is helping her.  I'll continue to monitor her wound and change her dressings as needed.  Continue to offload both heels.        Ar Rueda DPM  01/16/19  12:23 PM        Electronically signed by Ar Rueda DPM at 1/16/2019 12:23 PM     Milad Leone MD, FASN at 1/16/2019  7:17 AM          Nephrology Progress Note.    LOS: 6 days    Patient Care Team:  Rosa Zamora MD as PCP - General (Internal Medicine)  Geremias Shepherd MD as Consulting Physician (General Surgery)    Chief Complaint:  No chief complaint on file.      Subjective     Follow up for FREDDY and related issues.    Interval History:     Patient Complaints: none    Patient seen and examined this morning.  Events from last night noted.  Today's of first day that she responded to verbal combined has been able to move her upper extremities and did say that she wants to go home.    Review of Systems:    Patient is unresponsive no meaningful review of system is possible.      Objective     Vital Signs  BP  "152/70 (BP Location: Right arm, Patient Position: Lying)   Pulse 81   Temp 97.6 °F (36.4 °C) (Oral)   Resp 16   Ht 165.1 cm (65\")   Wt (!) 153 kg (336 lb 12.8 oz)   SpO2 97%   BMI 56.05 kg/m²        No intake/output data recorded.    Intake/Output Summary (Last 24 hours) at 1/16/2019 0717  Last data filed at 1/16/2019 0434  Gross per 24 hour   Intake 2144 ml   Output 4745 ml   Net -2601 ml       Physical Exam:    General Appearance:  no acute distress,   HEENT: Oral mucosa dry, extra occular movements intact. Sclera clear.  Skin: Warm and dry  Neck: supple, no JVD, trachea midline  Lungs:Chest shape is normal. Breath sounds heard bilaterally equally. No crackles, No wheezing.   Heart: regular rate and rhythm. normal S1 and S2, no S3, no rub, peripheral pulses weak but palpable.  Abdomen: Obese, soft, non-tender,  present bowel sounds to auscultation  : no palpable bladder.  Extremities: 4+ edema, no cyanosis or clubbing.   Neuro: Unable to evaluate     Results Review:    Results from last 7 days   Lab Units 01/16/19  0537 01/15/19  0416 01/14/19  0414 01/13/19  0423  01/11/19  0419   SODIUM mmol/L 138 142 138 138   < > 135*   POTASSIUM mmol/L 3.9 4.2 4.2 4.8   < > 5.7*   CHLORIDE mmol/L 109* 109* 109* 108*   < > 106   CO2 mmol/L 25.0* 19.0* 19.0* 18.0*   < > 19.0*   BUN mg/dL 77* 98* 82* 73*   < > 50*   CREATININE mg/dL 2.70* 3.70* 4.00* 3.80*   < > 3.50*   CALCIUM mg/dL 7.8* 7.7* 7.9* 8.1*   < > 8.0*   BILIRUBIN mg/dL  --   --  0.4 0.4  --  0.4   ALK PHOS U/L  --   --  272* 323*  --  305*   ALT (SGPT) U/L  --   --  27 34  --  37   AST (SGOT) U/L  --   --  16 21  --  28   GLUCOSE mg/dL 253* 220* 181* 202*   < > 139*    < > = values in this interval not displayed.       Estimated Creatinine Clearance: 33.4 mL/min (A) (by C-G formula based on SCr of 2.7 mg/dL (H)).    Results from last 7 days   Lab Units 01/16/19  0537 01/12/19  0431 01/11/19  0419 01/10/19  2110   MAGNESIUM mg/dL 2.5*  --  2.6* 2.5* "   PHOSPHORUS mg/dL  --  7.9* 7.4* 6.8*       Results from last 7 days   Lab Units 01/12/19  0431   URIC ACID mg/dL 4.4       Results from last 7 days   Lab Units 01/15/19  0418 01/14/19  0414 01/13/19  0423 01/12/19  0431 01/11/19  0419   WBC 10*3/mm3 19.82* 18.96* 22.61* 20.07* 18.35*   HEMOGLOBIN g/dL 8.1* 8.2* 8.6* 8.2* 7.6*   PLATELETS 10*3/mm3 83* 102* 131 133 142       Results from last 7 days   Lab Units 01/13/19  0901 01/10/19  2112   INR  1.41* 1.57*         Imaging Results (last 24 hours)     Procedure Component Value Units Date/Time    CT Head Without Contrast [[id number]] Collected:  01/15/19 1150     Updated:  01/15/19 1154    Narrative:       CT HEAD WITHOUT CONTRAST-     HISTORY: Altered mental status.     TECHNIQUE: Contiguous axial images were obtained from the skull vertex  to the skull base without administration of contrast.     FINDINGS:   Of note, this exam was performed on 01/11/2019 but never dictated.  Reportedly, no inquiries from the clinical service regarding the report  were made and this exam is submitted to me for interpretation on  01/15/2019.     Per the technologist, the patient refused to keep arms down for exam and  positioning of the arms does result in some streak artifact. Allowing  for this, no hemorrhage, mass effect or midline shift is identified. The  basal cisterns are patent. Ventricles are not enlarged. Minimal areas of  decreased attenuation in the white matter are nonspecific but suggestive  of minimal chronic microvascular ischemic type changes. Minimal vascular  calcifications. Of note, the patient did undergo a repeated head CT on  01/13/2019 which was read as no acute intracranial process as well.       Impression:       No acute intracranial findings identified. Minimal chronic  appearing changes. If symptoms persist, or if there is concern for  ischemia, consider MRI.        This study was performed with techniques to keep radiation doses as low  as reasonably  achievable (ALARA). Individualized dose reduction  techniques using automated exposure control or adjustment of mA and/or  kV according to the patient size were employed.      This report was finalized on 1/15/2019 11:52 AM by Jean Marie Schmitt MD.          famotidine 20 mg Intravenous Daily   heparin (porcine) 5,000 Units Subcutaneous Q8H   hydrALAZINE 25 mg Nasogastric Q8H   hydrocortisone sodium succinate 50 mg Intravenous Q12H   insulin detemir 20 Units Subcutaneous QAM   insulin regular 0-7 Units Subcutaneous Q6H   lactobacillus acidophilus 1 capsule Nasogastric Daily   levothyroxine 100 mcg Nasogastric Q AM   piperacillin-tazobactam 3.375 g Intravenous Q12H   sodium chloride 3 mL Intravenous Q12H       Pharmacy Consult     Sodium chloride 10 mL/hr Last Rate: 10 mL/hr (01/15/19 0902)       Medication Review:   Current Facility-Administered Medications   Medication Dose Route Frequency Provider Last Rate Last Dose   • acetaminophen (TYLENOL) tablet 650 mg  650 mg Oral Q4H PRN Milad Leone MD, GILA       • CHAPSTICK 1 each  1 each Apply externally Q1H PRN Samson Reyes MD   1 each at 01/15/19 1844   • dextrose (D50W) 25 g/ 50mL Intravenous Solution 25 g  25 g Intravenous Q15 Min PRN Milad Leone MD, FASN       • dextrose (GLUTOSE) oral gel 1 tube  1 tube Oral Q15 Min PRN Milad Leone MD, SHANIN       • famotidine (PEPCID) injection 20 mg  20 mg Intravenous Daily Liz Gregg MD   20 mg at 01/15/19 0850   • glucagon (human recombinant) (GLUCAGEN DIAGNOSTIC) injection 1 mg  1 mg Subcutaneous PRN Milad Leone MD, FASN       • glycerin 35 % liquid 35% 2 spray  2 spray Mouth/Throat Q2H PRN Maribel Wheeler DO   2 spray at 01/15/19 1844   • heparin (porcine) 5000 UNIT/ML injection 5,000 Units  5,000 Units Subcutaneous Q8H Liz Gregg MD   5,000 Units at 01/16/19 0628   • heparin (porcine) injection 2,400 Units  2,400 Units Intracatheter PRN Milad Leone MD, FASN   2,400 Units at 01/15/19 1504    • hydrALAZINE (APRESOLINE) injection 10 mg  10 mg Intravenous Q6H PRN Liz Gregg MD   10 mg at 01/16/19 0447   • hydrALAZINE (APRESOLINE) tablet 25 mg  25 mg Nasogastric Q8H Samson Reyes MD   25 mg at 01/16/19 0629   • hydrocortisone sodium succinate (Solu-CORTEF) injection 50 mg  50 mg Intravenous Q12H Liz Gregg MD   50 mg at 01/16/19 0001   • insulin detemir (LEVEMIR) injection 20 Units  20 Units Subcutaneous QAM Samson Reyes MD   20 Units at 01/16/19 0628   • insulin regular (humuLIN R,novoLIN R) injection 0-7 Units  0-7 Units Subcutaneous Q6H Liz Gregg MD   4 Units at 01/16/19 0628   • lactobacillus acidophilus (RISAQUAD) capsule 1 capsule  1 capsule Nasogastric Daily Samson Reyes MD   1 capsule at 01/15/19 0850   • levothyroxine (SYNTHROID, LEVOTHROID) tablet 100 mcg  100 mcg Nasogastric Q AM Samson Reyes MD   100 mcg at 01/16/19 0627   • LORazepam (ATIVAN) injection 0.5 mg  0.5 mg Intravenous Q6H PRN Samson Reyes MD       • Morphine sulfate (PF) injection 2 mg  2 mg Intravenous Q4H PRN Milad Leone MD, FASN        And   • naloxone (NARCAN) injection 0.4 mg  0.4 mg Intravenous Q5 Min PRN Milad Leone MD, FASN       • ondansetron (ZOFRAN) tablet 4 mg  4 mg Oral Q6H PRN Milad Leone MD, FASN        Or   • ondansetron ODT (ZOFRAN-ODT) disintegrating tablet 4 mg  4 mg Oral Q6H PRN Milad Leone MD, FASN        Or   • ondansetron (ZOFRAN) injection 4 mg  4 mg Intravenous Q6H PRN Milad Leone MD, FASN       • Pharmacy Consult   Does not apply Continuous PRN Samson Reyes MD       • piperacillin-tazobactam (ZOSYN) 3.375 g in iso-osmotic dextrose 50 ml (premix)  3.375 g Intravenous Q12H Samson Reyes MD   3.375 g at 01/15/19 2007   • sodium chloride 0.9 % flush 1-10 mL  1-10 mL Intravenous PRN Milad Leone MD, FASN       • sodium chloride 0.9 % flush 3 mL  3 mL Intravenous Q12H Milad Leone MD, FASNADIR   3 mL at 01/15/19 2111   • Sodium chloride 0.9 % infusion  10  mL/hr Intravenous Continuous Milad Leone MD, FASN 10 mL/hr at 01/15/19 0902 10 mL/hr at 01/15/19 0902   • vancomycin 1750 mg in sodium chloride 0.9% 500 mL IVPB  1,750 mg Intravenous PRN Ar Rueda DPM       • zinc oxide 13 % cream   Topical BID PRN Milad Leone MD, GILA   1 application at 01/12/19 0414       Assessment/Plan         9.   Acute on chronic renal failure (CMS/HCC): It is likely secondary to hypotensive episodes as well as the use of Bactrim.  There is possibility that she may be taking nonsteroidals and other hemodynamic abnormality is causing the current problem.  There is no bloody around at this time who can help make the decisions or tell us what was done at home.  Finally I have came to know about that she has a blind sister both of them try to work together to take care of each other.  Her sister is on dialysis and is my patient.  10.   Cellulitis of both lower extremities: Continue with the IV antibiotics.  She likely has an abscess on the foot that needs to be addressed  11.   Altered mental status: There is minimal improvement in her mental status.  12.   Anemia: Transfused as needed  13.   Bilateral edema of lower extremity:  she needs a central line placed the temporary dialysis catheter and use it as a central line and likely will need dialysis in the morning.  14.   GERD (gastroesophageal reflux disease)  15.   Hyperkalemia:  it is resolved at this point  16.   Hypertension: Under fair control  17.   Hypothermia  18.   Hypothyroidism  19.   Type 2 diabetes mellitus with complication (CMS/[facility])  20.   Vitamin B12 deficiency    Plan:    · Continue dialysis with increase ultrafiltration and optimize volume status.  · Clinically starting to improve.  · Details were also discussed with the hospitalist service.    · Surveillance labs.  · Further recommendations will depend on clinical course of the patient during the current hospitalization.    · I also discussed the details with  the nursing staff.  · Rest as ordered.    Milad Leone MD, FASN  19  7:17 AM    Dictated utilizing Dragon dictation.    Electronically signed by Milad Leone MD, FASN at 2019  5:00 PM     Samson Reyes MD at 1/15/2019  4:21 PM                [facility]    PROGRESS NOTE    Name:  Millicent Mckeon   Age:  58 y.o.  Sex:  female  :  1960  MRN:  [id number]   Visit Number:  [id number]  Admission Date:  1/10/2019  Date Of Service:  01/15/19  Primary Care Physician:  Rosa Zamora MD     LOS: 5 days :  Patient Care Team:  Rosa Zamora MD as PCP - General (Internal Medicine)  Geremias Shepherd MD as Consulting Physician (General Surgery):    Chief Complaint:      Altered mental status, hypothermia and cellulitis.    Subjective / Interval History:     Ms. Mckeon is currently lying down on the bed and is more alert compared to yesterday.  Unfortunately she is not able to answer questions appropriately.  At times she does obey commands.  She did undergo right foot surgery done by Dr. Rueda for her ankle yesterday.  She has remained hemodynamically stable and her temperature has improved as well.  In fact, she is currently having hypertension with systolic blood pressure in the 170s.  She did have a right subclavian dialysis catheter placed by Dr. Trujillo.  She will be undergoing her first hemodialysis today.    She was transferred from [facility] emergency room on 1/10/2019 with symptoms of generalized weakness, altered mental status, shortness of breath and leg cellulitis.  She was noted to have hypothermia and hypotension and was placed on warming blanket on presentation.  She was placed on broad-spectrum IV antibiotic therapy with Zosyn and vancomycin and a consultation was obtained from Dr. Rueda from podiatry.  She was also noted to have acute renal failure and has been followed by Dr. Leone.  According to the sister Virginia, patient was in her normal state  of health about 2 weeks ago since when she started having generalized weakness and worsening leg swelling.  She has morbid obesity and apparently uses a wheelchair.  She has had another visit to the emergency room recently and was evaluated with CAT scan of the chest and abdomen and was told to have multiple lymph nodes.  She was subsequently recommended to go to [facility] for further evaluation of these lymph nodes.    According to the sister, who is the power of , patient has had progressive decline in her general health associated with generalized weakness in the last one week necessary dating the visit to the emergency room where they found her to have hypothermia.  Since admission to the ICU he had [facility], patient has not improved much with regards to her mental status.  Initial CT of the head was negative for any acute abnormalities.  Patient's body habitus is too large for the MRI scan.  There has been no history of any tonic-clonic convulsions.      Review of Systems:     I am unable to obtain complete review of systems due to her altered mental status.    Vital Signs:    Heart Rate:  [71-95] 75  Resp:  [13-18] 16  BP: (142-178)/(61-81) 147/66    Intake and output:    I/O last 3 completed shifts:  In: 2170 [I.V.:1408; Other:392; NG/GT:244; IV Piggyback:126]  Out: 9650 [Urine:9650]  I/O this shift:  In: 60 [P.O.:30; Other:30]  Out: 3795 [Urine:1795; Other:2000]    Physical Examination:    General Appearance:  Drowsy and opens eyes on call, not in any acute distress.   Head:  Atraumatic and normocephalic, without obvious abnormality.   Eyes:          PERRLA, conjunctivae and sclerae normal, no Icterus. No pallor. Doll's eye movement present.     Neck: Supple, short neck, trachea midline, no thyromegaly, no carotid bruit.   Lungs:   Chest shape is normal. Breath sounds decreased bilaterally due to thick chest wall.  No wheezing.  Bilateral scattered crackles  heard. No Pleural rub or bronchial breathing.   Heart:  Normal S1 and S2, no murmur, no gallop, no rub. No JVD   Abdomen:   Normal bowel sounds, no masses, no organomegaly. Soft, non-tender, obese with a large pannus with healing ulcers noted on the skin.   Extremities: Large lower extremities due to obesity with multiple skin folds that are indurated and hyperemic.  Superficial skin ulcer noted on the lateral aspect of the right leg without any purulent discharge.  Black discoloration of the right heel on the lateral aspect noted.  Wound noted on the medial aspect of the right thigh with purulent slough.  Excoriations noted on bilateral legs and feet.     Skin: As described above.  Please see nurse's notes and the p the chart for further details.     Neurologic: Drowsy but more alert today.  Not able to squeeze the hands.  Not very cooperative to check for motor power.     Laboratory results:    Results from last 7 days   Lab Units  01/15/19   0416 01/14/19 0414 01/13/19 0423 01/11/19 0419   SODIUM mmol/L  142  138  138   < >  135*   POTASSIUM mmol/L  4.2  4.2  4.8   < >  5.7*   CHLORIDE mmol/L  109*  109*  108*   < >  106   CO2 mmol/L  19.0*  19.0*  18.0*   < >  19.0*   BUN mg/dL  98*  82*  73*   < >  50*   CREATININE mg/dL  3.70*  4.00*  3.80*   < >  3.50*   CALCIUM mg/dL  7.7*  7.9*  8.1*   < >  8.0*   BILIRUBIN mg/dL   --   0.4  0.4   --   0.4   ALK PHOS U/L   --   272*  323*   --   305*   ALT (SGPT) U/L   --   27  34   --   37   AST (SGOT) U/L   --   16  21   --   28   GLUCOSE mg/dL  220*  181*  202*   < >  139*    < > = values in this interval not displayed.     Results from last 7 days   Lab Units  01/15/19   0418  01/14/19 0414 01/13/19 0423   WBC 10*3/mm3  19.82*  18.96*  22.61*   HEMOGLOBIN g/dL  8.1*  8.2*  8.6*   HEMATOCRIT %  26.2*  26.5*  27.1*   PLATELETS 10*3/mm3  83*  102*  131     Results from last 7 days   Lab Units  01/13/19   0901  01/10/19   2112   INR   1.41*  1.57*     Results  from last 7 days   Lab Units  01/12/19   0431  01/10/19   2110   CK TOTAL U/L  40  35   TROPONIN I ng/mL   --   0.018     Results from last 7 days   Lab Units  01/11/19   0744  01/11/19   0735  01/10/19   2318  01/10/19   2313   BLOODCX   No growth at 4 days  No growth at 4 days   --    --    URINECX    --    --    --   Mixed Chrissie Isolated   MRSA SCREEN CX    --    --   No Methicillin Resistant Staphylococcus aureus isolated   --      I have reviewed the patient's laboratory results.    Radiology results:    Imaging Results (last 24 hours)     Procedure Component Value Units Date/Time    CT Head Without Contrast [[id number]] Collected:  01/15/19 1150     Updated:  01/15/19 1154    Narrative:       CT HEAD WITHOUT CONTRAST-     HISTORY: Altered mental status.     TECHNIQUE: Contiguous axial images were obtained from the skull vertex  to the skull base without administration of contrast.     FINDINGS:   Of note, this exam was performed on 01/11/2019 but never dictated.  Reportedly, no inquiries from the clinical service regarding the report  were made and this exam is submitted to me for interpretation on  01/15/2019.     Per the technologist, the patient refused to keep arms down for exam and  positioning of the arms does result in some streak artifact. Allowing  for this, no hemorrhage, mass effect or midline shift is identified. The  basal cisterns are patent. Ventricles are not enlarged. Minimal areas of  decreased attenuation in the white matter are nonspecific but suggestive  of minimal chronic microvascular ischemic type changes. Minimal vascular  calcifications. Of note, the patient did undergo a repeated head CT on  01/13/2019 which was read as no acute intracranial process as well.       Impression:       No acute intracranial findings identified. Minimal chronic  appearing changes. If symptoms persist, or if there is concern for  ischemia, consider MRI.        This study was performed with techniques to  keep radiation doses as low  as reasonably achievable (ALARA). Individualized dose reduction  techniques using automated exposure control or adjustment of mA and/or  kV according to the patient size were employed.      This report was finalized on 1/15/2019 11:52 AM by Jean Marie Schmitt MD.        I have reviewed the patient's radiology reports.    Medication Review:     I have reviewed the patients active and prn medications.       Cellulitis of both lower extremities    Anemia    Bilateral edema of lower extremity    Cellulitis of lower extremity    Acute on chronic renal failure (CMS/HCC)    GERD (gastroesophageal reflux disease)    Hyperkalemia    Essential hypertension    Hypothermia    Hypothyroidism    Type 2 diabetes mellitus with complication (CMS/HCC)    Vitamin B12 deficiency    Acute metabolic encephalopathy    Assessment:    1.  Sepsis with hypothermia, present on admission.  2.  Bilateral lower extremity cellulitis, present on admission.  3.  Acute metabolic encephalopathy, present on admission.  4.  Acute renal failure, present on admission.  5.  Hyperkalemia, present on admission.  6.  Acquired hypothyroidism, uncontrolled.  7.  Diabetes mellitus type 2 with nephropathy.  8.  Morbid obesity with a BMI of 63.  9.  Chronic venous insufficiency of the lower extremities.  10. Retrocrural, paratracheal, retro-peritoneal, bilateral inguinal lymphadenopathy, uncertain etiology.  11.  Left thyroid nodule 2 cm on recent CT scan.    Plan:    Ms. Mckeon seems to be slightly better with regards to her encephalopathy.  Her renal function is slightly better with creatinine at 2.7 but her BUN has worsened.  The exact etiology for the improvement could be related to improvement in her sepsis due to surgery of her right foot and continued antibiotic therapy.  She also did get IV levothyroxine yesterday which may have helped as well.    Patient will be continued on broad-spectrum IV antibiotic therapy with Zosyn and  vancomycin.  Blood cultures have been negative so far.  I have discussed the patient's condition with Dr. Leone and he will order hemodialysis today.  Wound cultures from her right leg are growing Proteus.  She had a repeat CT of the head today which did not show any new findings but showed chronic microvascular disease.    Patient's recent CT scan of the abdomen and pelvis done at [facility] emergency room noted several lymphadenopathy especially in the retroperitoneal and bilateral inguinal regions as well as paratracheal and retrocrural. Our initial CT scan reports done here did not mention these findings but I discussed with our radiologist on 1/14/2019 and he reviewed the scans done here and he stated that he does see the lymph nodes in the above-mentioned areas.  He also stated that the patient has a tiny nodule on the left lobe of the thyroid but he did not think it was 2 cm in size.    Since the patient's mentation has improved, I will discontinue EEG that was ordered yesterday.  Further recommendations depend upon her clinical course.  Unfortunately, her prognosis is extremely poor due to multiple comorbidities, morbid obesity as well as the presence of lymphadenopathy the etiology of which is uncertain at this time. She is on heparin for DVT prophylaxis.  She does have mild decrease in her platelet level to 83 today.  This is likely related to sepsis.  If it continues to drop I will discontinue heparin at that point.      Samson Reyes MD  01/15/19  4:21 PM    Dictated utilizing Dragon dictation.      Electronically signed by Samson Reyes MD at 1/15/2019  5:12 PM     Ar Rueda DPM at 1/15/2019 12:53 PM              Patient Care Team:  Rosa Zamora MD as PCP - General (Internal Medicine)  Geremias Shepherd MD as Consulting Physician (General Surgery)    Chief complaint right heel.    Subjective .   58-year-old morbidly obese diabetic female with multiple comorbidities, seen at bedside  in the ICU today receiving dialysis.  She is 1 day status post debridement and grafting of the right heel wound.  Dressing is intact but she has had some bleeding in which the dressing has had to be reinforced multiple times.  She was noted bleed postoperatively and I had anticipated bleeding given her blood thinners and a comorbidities but this seems to have coagulated and slowed.  She somewhat more verbal today but still noncommunicative and not making sense.  She does somewhat yell out random words on occasion.      Review of Systems  All systems were reviewed and negative except for chief complaint.    History  Past Medical History:   Diagnosis Date   • Diabetes mellitus (CMS/HCC)    • Disease of thyroid gland    • GERD (gastroesophageal reflux disease)    • Hypertension    , Past Surgical History:   Procedure Laterality Date   • EYE SURGERY     • INCISION AND DRAINAGE LEG Right 1/14/2019    Procedure: Right heel incision and drainage with graft application;  Surgeon: Ar Rueda DPM;  Location: [facility];  Service: Podiatry   • LEG SURGERY     , Family History   Problem Relation Age of Onset   • Asthma Mother    • Hypertension Father    • Stroke Father    , Social History     Tobacco Use   • Smoking status: Never Smoker   • Smokeless tobacco: Never Used   Substance Use Topics   • Alcohol use: No     Frequency: Never   • Drug use: No   , Medications Prior to Admission   Medication Sig Dispense Refill Last Dose   • ferrous sulfate 324 (65 Fe) MG tablet delayed-release EC tablet Take 324 mg by mouth 2 (Two) Times a Day.      • gabapentin (NEURONTIN) 600 MG tablet Take 600 mg by mouth 3 (Three) Times a Day.      • glipiZIDE (GLUCOTROL) 10 MG tablet Take 20 mg by mouth 2 (Two) Times a Day.      • Insulin Glargine (BASAGLAR KWIKPEN) 100 UNIT/ML injection pen Inject 30 Units under the skin into the appropriate area as directed Every Night.      • meclizine 25 MG chewable tablet chewable tablet Chew 25 mg 3  "(Three) Times a Day As Needed (for dizziness or motion sickness).      • allopurinol (ZYLOPRIM) 300 MG tablet Take 300 mg by mouth Daily.      • amLODIPine (NORVASC) 5 MG tablet Take 5 mg by mouth Daily.  0    • esomeprazole (nexIUM) 40 MG capsule Take 40 mg by mouth Every Morning Before Breakfast.  0    • furosemide (LASIX) 20 MG tablet Take 20 mg by mouth 2 (Two) Times a Day.      • RA ASPIRIN EC 81 MG EC tablet Take 81 mg by mouth Daily.  0    • RA VITAMIN C 500 MG tablet Take 500 mg by mouth Daily.  0    • RA VITAMIN D-3 2000 units capsule Take 2,000 Units by mouth Daily.  0    • simvastatin (ZOCOR) 20 MG tablet Take 20 mg by mouth Every Night.  0    • TRADJENTA 5 MG tablet tablet Take 5 mg by mouth Daily.      , Scheduled Meds:    famotidine 20 mg Intravenous Daily   heparin (porcine) 5,000 Units Subcutaneous Q8H   hydrALAZINE 25 mg Nasogastric Q8H   hydrocortisone sodium succinate 50 mg Intravenous Q12H   insulin detemir 20 Units Subcutaneous QAM   insulin regular 0-7 Units Subcutaneous Q6H   lactobacillus acidophilus 1 capsule Nasogastric Daily   levothyroxine 100 mcg Nasogastric Q AM   piperacillin-tazobactam 3.375 g Intravenous Once   piperacillin-tazobactam 3.375 g Intravenous Q12H   sodium chloride 3 mL Intravenous Q12H   , Continuous Infusions:    Pharmacy Consult     Sodium chloride 10 mL/hr Last Rate: 10 mL/hr (01/15/19 0902)   , PRN Meds:  •  acetaminophen  •  CHAPSTICK  •  dextrose  •  dextrose  •  glucagon (human recombinant)  •  glycerin  •  heparin (porcine)  •  hydrALAZINE  •  Morphine **AND** naloxone  •  ondansetron **OR** ondansetron ODT **OR** ondansetron  •  Pharmacy Consult  •  sodium chloride  •  sodium chloride  •  zinc oxide and Allergies:  Metformin and related    Objective     Vital Signs   /68 (BP Location: Right arm, Patient Position: Lying)   Pulse 89   Temp 95 °F (35 °C) (Oral)   Resp 16   Ht 165.1 cm (65\")   Wt (!) 171 kg (375 lb 14.4 oz)   SpO2 96%   BMI 62.55 kg/m²  "     Physical Exam:  Review the dressing and there is maceration to the arch but the wound itself is healthy and coagulated.  Graft is intact to the wound bed underneath the packing.  The wound bed seems to be granular and has a hematoma and clot formation.  No significant change to size.  No drainage or odor.  The perimeter of the wound aside from some maceration seems to be healthy.       Results Review: Laboratory data reviewed.  Cultures are growing multiple organisms.  Gram-positive cocci in pairs and change in clusters are noted.  Cultures from 1/10/19 are positive for Proteus.  Also positive for gram-negative bacilli  Blood cultures are negative at 4 days.      Assessment/Plan   58-year-old morbidly obese diabetic female with multiple comorbidities, seen at bedside in the ICU today receiving dialysis.  She is 1 day status post debridement and grafting of the right heel wound.     Cellulitis of both lower extremities    Anemia    Bilateral edema of lower extremity    Cellulitis of lower extremity    Acute on chronic renal failure (CMS/HCC)    GERD (gastroesophageal reflux disease)    Hyperkalemia    Essential hypertension    Hypothermia    Hypothyroidism    Type 2 diabetes mellitus with complication (CMS/HCC)    Vitamin B12 deficiency    Acute metabolic encephalopathy  Changed her dressing today to allow the periwound to dry.  The wound should be amenable to wound VAC so that will be ordered and we will begin wound VAC changes to the right heel.  Continue medical management and antibiotics per primary team.  She's finally receiving hemodialysis which may help as well.  We'll continue to follow.        Ar Rueda DPM  01/15/19  12:53 PM        Electronically signed by Ar Rueda DPM at 1/15/2019 12:58 PM       Medical Student Notes (last 24 hours) (Notes from 1/17/2019 10:30 AM through 1/18/2019 10:30 AM)     No notes of this type exist for this encounter.           Physical Therapy Notes (last 24  hours) (Notes from 2019 10:30 AM through 2019 10:30 AM)      Manuela Gomez, PT at 2019  3:18 PM  Version 1 of 1         Problem: Patient Care Overview  Goal: Plan of Care Review  Outcome: Ongoing (interventions implemented as appropriate)   19 1517   Coping/Psychosocial   Plan of Care Reviewed With patient   Plan of Care Review   Progress improving   OTHER   Outcome Summary PT tx completed. Pt more alert and interactive today but with nausea/vomiting. Performs trunk strengthening exercises in bed as indicated on care map. Cont to goals.           Electronically signed by Manuela Gomez, PT at 2019  3:18 PM     Manuela Gomez, PT at 2019  3:19 PM  Version 1 of 1         Acute Care - Physical Therapy Treatment Note  [facility]     Patient Name: Millicent Mckeon  : 1960  MRN: [id number]  Today's Date: 2019  Onset of Illness/Injury or Date of Surgery: 01/10/19  Date of Referral to PT: 19  Referring Physician: Dr. Gregg    Admit Date: 1/10/2019    Visit Dx:    ICD-10-CM ICD-9-CM   1. Altered mental status, unspecified altered mental status type R41.82 780.97   2. Cellulitis of both lower extremities L03.115 682.6    L03.116    3. Impaired functional mobility, balance, gait, and endurance Z74.09 V49.89   4. Impaired mobility and ADLs Z74.09 799.89   5. Dysphagia, oropharyngeal phase R13.12 787.22     Patient Active Problem List   Diagnosis   • Altered mental status   • Anemia   • Bilateral edema of lower extremity   • Cellulitis of lower extremity   • Acute on chronic renal failure (CMS/HCC)   • GERD (gastroesophageal reflux disease)   • Hyperkalemia   • Essential hypertension   • Hypothermia   • Hypothyroidism   • Type 2 diabetes mellitus with complication (CMS/HCC)   • Vitamin B12 deficiency   • Cellulitis of both lower extremities   • Acute metabolic encephalopathy       Therapy Treatment    Rehabilitation Treatment Summary     Row Name 19 1340 19 133           Treatment Time/Intention    Discipline  occupational therapist  (Pended)   -SD  physical therapist  -LM     Document Type  therapy note (daily note)  (Pended)   -SD  therapy note (daily note)  -LM     Subjective Information  complains of;fatigue  (Pended)   -SD  complains of;fatigue;nausea/vomiting  -LM     Mode of Treatment  occupational therapy  (Pended)   -SD  physical therapy  -LM     Patient/Family Observations  Supine in bed, IV intact, on room air  (Pended)   -SD  Pt received in vilchis's position in bed.  -LM     Care Plan Review  care plan/treatment goals reviewed  (Pended)   -SD  care plan/treatment goals reviewed;patient/other agree to care plan  -LM     Therapy Frequency (PT Clinical Impression)  --  daily  -LM     Patient Effort  adequate  (Pended)   -SD  fair  -LM     Existing Precautions/Restrictions  fall  (Pended)   -SD  fall;oxygen therapy device and L/min  -LM     Patient Response to Treatment  Pt experienced vomiting after drinking sip of nectar thick tea. RN said to keep NPO until she could contact [facility]   (Pended)   -SD  Fatigues quickly.  -LM     Recorded by [SD] Hannah Santos, OT 01/17/19 1518 [LM] Manuela Gomez, PT 01/17/19 1516     Row Name 01/17/19 1340             Vital Signs    Pre SpO2 (%)  97  (Pended)   -SD      O2 Delivery Pre Treatment  room air  (Pended)   -SD      Intra SpO2 (%)  96  (Pended)   -SD      O2 Delivery Intra Treatment  room air  (Pended)   -SD      Post SpO2 (%)  97  (Pended)   -SD      O2 Delivery Post Treatment  room air  (Pended)   -SD      Recorded by [SD] Hannah Santos, OT 01/17/19 1518      Row Name 01/17/19 1336             Bed Mobility Assessment/Treatment    Bed Mobility Assessment/Treatment  other (see comments) Longsit forward reaches;Trunk rotation  -LM      Bed Mobility, Safety Issues  decreased use of arms for pushing/pulling;decreased use of legs for bridging/pushing;impaired trunk control for bed mobility  -LM      Assistive Device (Bed  Mobility)  bed rails;head of bed elevated  -LM      Recorded by [LM] Manuela Gomez, PT 01/17/19 1516      Row Name 01/17/19 1336             Transfer Assessment/Treatment    Comment (Transfers)  Unable to assess.  -LM      Recorded by [LM] Manuela Gomez, PT 01/17/19 1516      Row Name 01/17/19 1336             Gait/Stairs Assessment/Training    Comment (Gait/Stairs)  Unable to assess.  -LM      Recorded by [LM] Manuela Gomez, PT 01/17/19 1516      Row Name 01/17/19 1340             Grooming Assessment/Training    [place] Level (Grooming)  wash face, hands;moderate assist (50% patient effort)  (Pended)   -SD      Recorded by [SD] Hannah Santos OT 01/17/19 1518      Row Name 01/17/19 1340             Motor Skills Assessment/Interventions    Additional Documentation  Therapeutic Exercise (Group)  (Pended)   -SD      Recorded by [SD] Hannah Santos OT 01/17/19 1518      Row Name 01/17/19 1336             Therapeutic Exercise    Core Strength (Therapeutic Exercise)  other (see comments) Longsit forward reaches;Trunk rotation  -LM      Exercise Type (Therapeutic Exercise)  AAROM (active assistive range of motion)  -LM      Sets/Reps (Therapeutic Exercise)  1 x 10 reps  -LM      Recorded by [LM] Manuela Gomez, PT 01/17/19 1516      Row Name 01/17/19 1336             Positioning and Restraints    Pre-Treatment Position  in bed  -LM      Post Treatment Position  bed  -LM      In Bed  fowlers;call light within reach;encouraged to call for assist  -LM      Recorded by [LM] Manuela Gomez, PT 01/17/19 1516      Row Name 01/17/19 1336             Pain Scale: Numbers Pre/Post-Treatment    Pain Scale: Numbers, Pretreatment  0/10 - no pain  -LM      Pain Scale: Numbers, Post-Treatment  0/10 - no pain  -LM      Recorded by [LM] Manuela Gomez, PT 01/17/19 1516      Row Name                Wound 01/10/19 1925 Right foot    Wound - Properties Group Date first assessed: 01/10/19 [CH] Time first assessed: 1925 [CH] Side: Right  [CH] Location: foot [CH] Stage, Pressure Injury: deep tissue injury;unstageable [CH] Additional Comments: right heel. [CH2] Recorded by:  [CH] Jacqueline Munroe RN 01/11/19 0215 [CH2] Jacqueline Munroe RN 01/11/19 0225    Row Name                Wound 01/10/19 1925 Right lower;lateral leg abrasion    Wound - Properties Group Date first assessed: 01/10/19 [CH] Time first assessed: 1925 [CH] Side: Right [CH] Orientation: lower;lateral [CH] Location: leg [CH] Type: abrasion [CH] Stage, Pressure Injury: Stage 1 [CH] Recorded by:  [CH] Jacqueline Munroe RN 01/11/19 0217    Row Name                Wound 01/10/19 1925 Left anterior;lower;proximal leg unspecified    Wound - Properties Group Date first assessed: 01/10/19 [CH] Time first assessed: 1925 [CH] Side: Left [CH] Orientation: anterior;lower;proximal [CH] Location: leg [CH] Type: unspecified [CH] Recorded by:  [CH] Jacqueline Munroe RN 01/11/19 0221    Row Name                Wound 01/10/19 1925 Left gluteal abrasion    Wound - Properties Group Date first assessed: 01/10/19 [CH] Time first assessed: 1925 [CH] Present On Admission : yes;picture taken [CH] Side: Left [CH] Location: gluteal [CH] Type: abrasion [CH] Recorded by:  [CH] Jacqueline Munroe RN 01/11/19 0224    Row Name                Wound 01/10/19 1925 Right posterior thigh abrasion    Wound - Properties Group Date first assessed: 01/10/19 [CH] Time first assessed: 1925 [CH] Present On Admission : yes;picture taken [CH] Side: Right [CH] Orientation: posterior [CH] Location: thigh [CH] Type: abrasion [CH] Recorded by:  [CH] Jacqueline Munroe RN 01/11/19 0230    Row Name                Wound 01/10/19 1925    Wound - Properties Group Date first assessed: 01/10/19 [CH] Time first assessed: 1925 [CH] Present On Admission : yes;picture taken [CH] Recorded by:  [CH] Jacqueline Munroe, RN 01/11/19 0302    Row Name                Wound 01/10/19 1925 abdomen    Wound - Properties Group Date first assessed: 01/10/19 [CH] Time first  assessed: 1925 [CH] Present On Admission : yes;picture taken [CH] Location: abdomen [CH] Recorded by:  [CH] Jacqueline Munroe, RN 01/11/19 0323    Row Name                [REMOVED] Wound 01/14/19 1400 Right foot incision    Wound - Properties Group Date first assessed: 01/14/19 [ADRIANA] Time first assessed: 1400 [ADRIANA] Side: Right [ADRIANA] Location: foot [ADRIANA] Type: incision [ADRIANA] Additional Comments: chartex x2; removed one  [BA] Resolution Date: 01/17/19 [BA] Resolution Time: 1031 [BA] Recorded by:  [BA] Kayley Mcnair, RN 01/17/19 1031 [ADRIANA] Indio Spain RN 01/14/19 1400    Row Name 01/17/19 1336             Outcome Summary/Treatment Plan (PT)    Daily Summary of Progress (PT)  progress towards functional goals is fair  -LM      Plan for Continued Treatment (PT)  Cont PT per POC to goals.  -LM      Anticipated Discharge Disposition (PT)  inpatient rehabilitation facility  -LM      Recorded by [LM] Manuela Gomez, PT 01/17/19 1516        User Key  (r) = Recorded By, (t) = Taken By, (c) = Cosigned By    Initials Name Effective Dates Discipline    CH Jacqueline Munroe, RN 11/07/16 -  Nurse    Manuela Acevedo, PT 04/03/18 -  PT    Kayley Duran RN 10/26/16 -  Nurse    Hannah Williamson, OT 03/07/18 -  OT    Indio Downing RN 11/14/18 -  Nurse          Wound 01/10/19 1925 Right foot (Active)   Dressing Appearance dry;intact 1/17/2019 12:00 PM   Drainage Amount none 1/17/2019 12:00 PM       Wound 01/10/19 1925 Right lower;lateral leg abrasion (Active)   Dressing Appearance dry;intact 1/16/2019  4:00 PM   Base dressing in place, unable to visualize 1/17/2019 12:00 PM   Drainage Characteristics/Odor bleeding controlled 1/17/2019 10:00 AM   Drainage Amount none 1/17/2019 12:00 PM   Care, Wound cleansed with 1/17/2019 10:00 AM   Dressing Care, Wound silicone 1/17/2019 10:00 AM       Wound 01/10/19 1925 Left anterior;lower;proximal leg unspecified (Active)   Drainage Amount none 1/17/2019 12:00 PM   Dressing  Care, Wound other (see comments) 1/17/2019  8:00 AM       Wound 01/10/19 1925 Left gluteal abrasion (Active)   Dressing Appearance intact 1/17/2019 12:00 PM   Base dressing in place, unable to visualize 1/16/2019  4:00 PM   Periwound pink 1/17/2019 10:00 AM   Periwound Temperature warm 1/17/2019 10:00 AM   Periwound Skin Turgor soft 1/17/2019 10:00 AM   Care, Wound cleansed with 1/17/2019 10:00 AM   Dressing Care, Wound silicone 1/17/2019 10:00 AM       Wound 01/10/19 1925 Right posterior thigh abrasion (Active)   Dressing Appearance intact 1/17/2019 12:00 PM   Base dressing in place, unable to visualize 1/16/2019  4:00 PM   Yellow (%), Wound Tissue Color 100 1/17/2019 10:00 AM   Wound Length (cm) 2 cm 1/17/2019 10:00 AM   Wound Width (cm) 2 cm 1/17/2019 10:00 AM   Wound Depth (cm) 0.1 cm 1/17/2019 10:00 AM   Drainage Characteristics/Odor serosanguineous 1/17/2019 10:00 AM   Drainage Amount none 1/17/2019 12:00 PM   Care, Wound cleansed with 1/17/2019 10:00 AM   Dressing Care, Wound silicone 1/17/2019 10:00 AM   Periwound Care, Wound barrier ointment applied 1/17/2019 10:00 AM       Wound 01/10/19 1925 (Active)   Drainage Amount none 1/17/2019 12:00 PM       Wound 01/10/19 1925 abdomen (Active)   Dressing Appearance open to air 1/17/2019 12:00 PM   Base scab;other (see comments) 1/16/2019  4:00 PM   Drainage Amount none 1/17/2019 12:00 PM       Rehab Goal Summary     Row Name 01/17/19 1336 01/17/19 0905          Bed Mobility Goal 1 (PT)    Progress/Outcomes (Bed Mobility Goal 1, PT)  goal ongoing  -LM  --        Transfer Goal 1 (PT)    Progress/Outcome (Transfer Goal 1, PT)  goal ongoing  -LM  --        Patient Education Goal (PT)    Progress/Outcome (Patient Education Goal, PT)  goal ongoing  -LM  --        Swallow Goals (SLP)    Oral Nutrition/Hydration Goal Selection (SLP)  --  oral nutrition/hydration, SLP goal 1  -ES     Labial Strengthening Goal Selection (SLP)  --  labial strengthening, SLP goal 1  -ES      Lingual Strengthening Goal Selection (SLP)  --  lingual strengthening, SLP goal 1  -ES     Pharyngeal Strengthening Exercise Goal Selection (SLP)  --  pharyngeal strengthening exercise, SLP goal 1  -ES     Additional Documentation  --  pharyngeal strengthening exercise goal selection (SLP)  -ES        Oral Nutrition/Hydration Goal 1 (SLP)    Oral Nutrition/Hydration Goal 1, SLP  --  Consume [facility] soft diet texture w/ thin liquids w/ no s/s aspiration  -ES     Time Frame (Oral Nutrition/Hydration Goal 1, SLP)  --  by discharge  -ES     Progress/Outcomes (Oral Nutrition/Hydration Goal 1, SLP)  --  goal revised this date;goal ongoing diet upgraded to puree w/ NTL  -ES        Labial Strengthening Goal 1 (SLP)    Activity (Labial Strengthening Goal 1, SLP)  --  increase labial tone  -ES     Increase Labial Tone  --  labial resistance exercises;swallow trials  -ES     [place]/Accuracy (Labial Strengthening Goal 1, SLP)  --  independently (over 90% accuracy)  -ES     Time Frame (Labial Strengthening Goal 1, SLP)  --  by discharge  -ES     Progress/Outcomes (Labial Strengthening Goal 1, SLP)  --  goal ongoing  -ES        Lingual Strengthening Goal 1 (SLP)    Activity (Lingual Strengthening Goal 1, SLP)  --  increase lingual tone/sensation/control/coordination/movement;increase tongue back strength  -ES     Increase Lingual Tone/Sensation/Control/Coordination/Movement  --  lingual movement exercises;swallow trials;lingual resistance exercises  -ES     Increase Tongue Back Strength  --  lingual movement exercises;swallow trials;lingual resistance exercises  -ES     [place]/Accuracy (Lingual Strengthening Goal 1, SLP)  --  with minimal cues (75-90% accuracy)  -ES     Time Frame (Lingual Strengthening Goal 1, SLP)  --  by discharge  -ES     Progress/Outcomes (Lingual Strengthening Goal 1, SLP)  --  goal ongoing  -ES        Pharyngeal Strengthening Exercise Goal 1 (SLP)    Activity (Pharyngeal Strengthening Goal 1, SLP)   --  increase timing thin liquid trials  -ES     Increase Timing  --  gustatory stimulation (sour/cold);hard effortful swallow  -ES     [place]/Accuracy (Pharyngeal Strengthening Goal 1, SLP)  --  with minimal cues (75-90% accuracy)  -ES     Time Frame (Pharyngeal Strengthening Goal 1, SLP)  --  by discharge  -ES     Progress/Outcomes (Pharyngeal Strengthening Goal 1, SLP)  --  goal ongoing  -ES       User Key  (r) = Recorded By, (t) = Taken By, (c) = Cosigned By    Initials Name Provider Type Discipline    Manuela Acevedo, PT Physical Therapist PT    ES Jesika Braswell, MS CCC-SLP Speech and Language Pathologist SLP          Physical Therapy Education     Title: PT OT SLP Therapies (In Progress)     Topic: Physical Therapy (Done)     Point: Mobility training (Done)     Learning Progress Summary           Patient Acceptance, E,TB, VU by  at 1/17/2019  3:16 PM    Comment:  Ther ex for strengthening.    Acceptance, E, NR by EB at 1/17/2019 10:30 AM    Comment:  pt alert and Dr Reyes at bedside and discussed plan of care with patient.  No family at bedside.    Acceptance, E,TB, VU by KATERYNA at 1/16/2019 11:45 AM    Comment:  Purpose of PT/POC.                   Point: Home exercise program (Done)     Learning Progress Summary           Patient Acceptance, E,TB, VU by KATERYNA at 1/17/2019  3:16 PM    Comment:  Ther ex for strengthening.    Acceptance, E, NR by EB at 1/17/2019 10:30 AM    Comment:  pt alert and Dr Reyes at bedside and discussed plan of care with patient.  No family at bedside.    Acceptance, E,TB, VU by KATERYNA at 1/16/2019 11:45 AM    Comment:  Purpose of PT/POC.                   Point: Precautions (Done)     Learning Progress Summary           Patient Acceptance, E,TB, VU by LM at 1/17/2019  3:16 PM    Comment:  Ther ex for strengthening.    Acceptance, E, NR by EB at 1/17/2019 10:30 AM    Comment:  pt alert and Dr Reyes at bedside and discussed plan of care with patient.  No family at bedside.     Acceptance, E,TB, VU by KATERYNA at 1/16/2019 11:45 AM    Comment:  Purpose of PT/POC.                               User Key     Initials Effective Dates Name Provider Type Discipline    EB 11/07/16 -  Viv Valente, RN Registered Nurse Nurse     04/03/18 -  Manuela Gomez, PT Physical Therapist PT                PT Recommendation and Plan  Anticipated Discharge Disposition (PT): inpatient rehabilitation facility  Planned Therapy Interventions (PT Eval): balance training, bed mobility training, gait training, home exercise program, patient/family education, strengthening, transfer training  Therapy Frequency (PT Clinical Impression): daily  Outcome Summary/Treatment Plan (PT)  Daily Summary of Progress (PT): progress towards functional goals is fair  Plan for Continued Treatment (PT): Cont PT per POC to goals.  Anticipated Discharge Disposition (PT): inpatient rehabilitation facility  Plan of Care Reviewed With: patient  Progress: improving  Outcome Summary: PT tx completed. Pt more alert and interactive today but with nausea/vomiting. Performs trunk strengthening exercises in bed as indicated on care map. Cont to goals.  Outcome Measures     Row Name 01/17/19 1336 01/16/19 1111 01/16/19 1109       How much help from another person do you currently need...    Turning from your back to your side while in flat bed without using bedrails?  1  -LM  --  1  -LM    Moving from lying on back to sitting on the side of a flat bed without bedrails?  1  -LM  --  1  -LM    Moving to and from a bed to a chair (including a wheelchair)?  1  -LM  --  1  -LM    Standing up from a chair using your arms (e.g., wheelchair, bedside chair)?  1  -LM  --  1  -LM    Climbing 3-5 steps with a railing?  1  -LM  --  1  -LM    To walk in hospital room?  1  -LM  --  1  -LM    AM-PAC 6 Clicks Score  6  -LM  --  6  -LM       How much help from another is currently needed...    Putting on and taking off regular lower body clothing?  --  1  -SD  --     Bathing (including washing, rinsing, and drying)  --  1  -SD  --    Toileting (which includes using toilet bed pan or urinal)  --  1  -SD  --    Putting on and taking off regular upper body clothing  --  1  -SD  --    Taking care of personal grooming (such as brushing teeth)  --  1  -SD  --    Eating meals  --  1  -SD  --    Score  --  6  -SD  --       Functional Assessment    Outcome Measure Options  AM-PAC 6 Clicks Basic Mobility (PT)  -LM  AM-PAC 6 Clicks Daily Activity (OT)  -SD  AM-PAC 6 Clicks Basic Mobility (PT)  -LM      User Key  (r) = Recorded By, (t) = Taken By, (c) = Cosigned By    Initials Name Provider Type    LM Manuela White, PT Physical Therapist    Hannah Williamson, OT Occupational Therapist         Time Calculation:   PT Charges     Row Name 19 1518             Time Calculation    Start Time  1336  -LM      PT Received On  19  -LM         Time Calculation- PT    Total Timed Code Minutes- PT  21 minute(s)  -LM        User Key  (r) = Recorded By, (t) = Taken By, (c) = Cosigned By    Initials Name Provider Type    LM Manuela White, PT Physical Therapist        Therapy Suggested Charges     Code   Minutes Charges    None           Therapy Charges for Today     Code Description Service Date Service Provider Modifiers Qty    13586479958  PT EVAL MOD COMPLEXITY 4 2019 Manueal White, PT GP 1    [id number]  PT THERAPEUTIC ACT EA 15 MIN 2019 Manuela White, PT GP 1          PT G-Codes  Outcome Measure Options: AM-PAC 6 Clicks Basic Mobility (PT)  AM-PAC 6 Clicks Score: 6  Score: 6    Manuela White PT  2019         Electronically signed by Manuela White PT at 2019  3:19 PM          Occupational Therapy Notes (last 24 hours) (Notes from 2019 10:30 AM through 2019 10:30 AM)      Hannah Santos, INDY at 2019  3:25 PM          Acute Care - Occupational Therapy Treatment Note  [facility]     Patient Name: Millicent Mckeon  : 1960  MRN:  [id number]  Today's Date: 1/17/2019  Onset of Illness/Injury or Date of Surgery: 01/10/19  Date of Referral to OT: 01/11/19  Referring Physician: Dr. Gregg    Admit Date: 1/10/2019       ICD-10-CM ICD-9-CM   1. Altered mental status, unspecified altered mental status type R41.82 780.97   2. Cellulitis of both lower extremities L03.115 682.6    L03.116    3. Impaired functional mobility, balance, gait, and endurance Z74.09 V49.89   4. Impaired mobility and ADLs Z74.09 799.89   5. Dysphagia, oropharyngeal phase R13.12 787.22     Patient Active Problem List   Diagnosis   • Altered mental status   • Anemia   • Bilateral edema of lower extremity   • Cellulitis of lower extremity   • Acute on chronic renal failure (CMS/HCC)   • GERD (gastroesophageal reflux disease)   • Hyperkalemia   • Essential hypertension   • Hypothermia   • Hypothyroidism   • Type 2 diabetes mellitus with complication (CMS/HCC)   • Vitamin B12 deficiency   • Cellulitis of both lower extremities   • Acute metabolic encephalopathy     Past Medical History:   Diagnosis Date   • Diabetes mellitus (CMS/HCC)    • Disease of thyroid gland    • GERD (gastroesophageal reflux disease)    • Hypertension      Past Surgical History:   Procedure Laterality Date   • EYE SURGERY     • INCISION AND DRAINAGE LEG Right 1/14/2019    Procedure: Right heel incision and drainage with graft application;  Surgeon: Ar Rueda DPM;  Location: [facility];  Service: Podiatry   • LEG SURGERY         Therapy Treatment    Rehabilitation Treatment Summary     Row Name 01/17/19 1340 01/17/19 1336          Treatment Time/Intention    Discipline  occupational therapist  -SD  physical therapist  -LM     Document Type  therapy note (daily note)  -SD  therapy note (daily note)  -LM     Subjective Information  complains of;fatigue  -SD  complains of;fatigue;nausea/vomiting  -LM     Mode of Treatment  occupational therapy  -SD  physical therapy  -LM     Patient/Family  Observations  Supine in bed, IV intact, on room air  -SD  Pt received in vilchis's position in bed.  -LM     Care Plan Review  care plan/treatment goals reviewed  -SD  care plan/treatment goals reviewed;patient/other agree to care plan  -LM     Therapy Frequency (PT Clinical Impression)  --  daily  -LM     Patient Effort  adequate  -SD  fair  -LM     Existing Precautions/Restrictions  fall  -SD  fall;oxygen therapy device and L/min  -LM     Patient Response to Treatment  Pt experienced vomiting after drinking sip of nectar thick tea. RN said to keep NPO until she could contact Dr.   -SD  Fatigues quickly.  -LM     Recorded by [SD] Hannah Santos, OT 01/17/19 1520 [LM] Manuela Gomez, PT 01/17/19 1516     Row Name 01/17/19 1340             Vital Signs    Pre SpO2 (%)  97  -SD      O2 Delivery Pre Treatment  room air  -SD      Intra SpO2 (%)  96  -SD      O2 Delivery Intra Treatment  room air  -SD      Post SpO2 (%)  97  -SD      O2 Delivery Post Treatment  room air  -SD      Recorded by [SD] Hannah Santos, OT 01/17/19 1520      Row Name 01/17/19 1336             Bed Mobility Assessment/Treatment    Bed Mobility Assessment/Treatment  other (see comments) Longsit forward reaches;Trunk rotation  -LM      Bed Mobility, Safety Issues  decreased use of arms for pushing/pulling;decreased use of legs for bridging/pushing;impaired trunk control for bed mobility  -LM      Assistive Device (Bed Mobility)  bed rails;head of bed elevated  -LM      Recorded by [LM] Manuela Gomez, PT 01/17/19 1516      Row Name 01/17/19 1336             Transfer Assessment/Treatment    Comment (Transfers)  Unable to assess.  -LM      Recorded by [LM] Manuela Gomez, PT 01/17/19 1516      Row Name 01/17/19 1336             Gait/Stairs Assessment/Training    Comment (Gait/Stairs)  Unable to assess.  -LM      Recorded by [LM] Manuela Gomez, PT 01/17/19 1516      Row Name 01/17/19 1340             Grooming Assessment/Training    [place] Level  (Grooming)  wash face, hands;moderate assist (50% patient effort)  -SD      Recorded by [SD] Hannah Santos, OT 01/17/19 1520      Row Name 01/17/19 1340             Motor Skills Assessment/Interventions    Additional Documentation  Therapeutic Exercise (Group)  -SD      Recorded by [SD] Hannah Santos, OT 01/17/19 1520      Row Name 01/17/19 1340 01/17/19 1336          Therapeutic Exercise    Upper Extremity Range of Motion (Therapeutic Exercise)  shoulder flexion/extension, bilateral;shoulder abduction/adduction, bilateral;shoulder horizontal abduction/adduction, bilateral;elbow flexion/extension, bilateral  -SD  --     Core Strength (Therapeutic Exercise)  -- TRUNK FLEXION/ROTATION X 10  -SD  other (see comments) Longsit forward reaches;Trunk rotation  -LM     Exercise Type (Therapeutic Exercise)  AAROM (active assistive range of motion)  -SD  AAROM (active assistive range of motion)  -LM     Position (Therapeutic Exercise)  supine  -SD  --     Sets/Reps (Therapeutic Exercise)  1 x 15  -SD  1 x 10 reps  -LM     Expected Outcome (Therapeutic Exercise)  improve functional tolerance, self-care activity  -SD  --     Recorded by [SD] Hannah Santos, OT 01/17/19 1520 [LM] Manuela Gomez, PT 01/17/19 1516     Row Name 01/17/19 1340 01/17/19 1336          Positioning and Restraints    Pre-Treatment Position  in bed  -SD  in bed  -LM     Post Treatment Position  bed  -SD  bed  -LM     In Bed  fowlers;call light within reach;encouraged to call for assist  -SD  fowlers;call light within reach;encouraged to call for assist  -LM     Recorded by [SD] Hannah Snatos, OT 01/17/19 1520 [LM] Manuela Gomez, PT 01/17/19 1516     Row Name 01/17/19 1340 01/17/19 1336          Pain Scale: Numbers Pre/Post-Treatment    Pain Scale: Numbers, Pretreatment  0/10 - no pain  -SD  0/10 - no pain  -LM     Pain Scale: Numbers, Post-Treatment  0/10 - no pain  -SD  0/10 - no pain  -LM     Recorded by [SD] Hannah Santos, OT 01/17/19 1520  [LM] Manuela Gomez, PT 01/17/19 1516     Row Name                Wound 01/10/19 1925 Right foot    Wound - Properties Group Date first assessed: 01/10/19 [CH] Time first assessed: 1925 [CH] Side: Right [CH] Location: foot [CH] Stage, Pressure Injury: deep tissue injury;unstageable [CH] Additional Comments: right heel. [CH2] Recorded by:  [CH] Jacqueline Munroe RN 01/11/19 0215 [CH2] Jacqueline Munroe RN 01/11/19 0225    Row Name                Wound 01/10/19 1925 Right lower;lateral leg abrasion    Wound - Properties Group Date first assessed: 01/10/19 [CH] Time first assessed: 1925 [CH] Side: Right [CH] Orientation: lower;lateral [CH] Location: leg [CH] Type: abrasion [CH] Stage, Pressure Injury: Stage 1 [CH] Recorded by:  [CH] Jacqueline Munroe RN 01/11/19 0217    Row Name                Wound 01/10/19 1925 Left anterior;lower;proximal leg unspecified    Wound - Properties Group Date first assessed: 01/10/19 [CH] Time first assessed: 1925 [CH] Side: Left [CH] Orientation: anterior;lower;proximal [CH] Location: leg [CH] Type: unspecified [CH] Recorded by:  [CH] Jacqueline Munroe RN 01/11/19 0221    Row Name                Wound 01/10/19 1925 Left gluteal abrasion    Wound - Properties Group Date first assessed: 01/10/19 [CH] Time first assessed: 1925 [CH] Present On Admission : yes;picture taken [CH] Side: Left [CH] Location: gluteal [CH] Type: abrasion [CH] Recorded by:  [CH] Jacqueline Munroe RN 01/11/19 0224    Row Name                Wound 01/10/19 1925 Right posterior thigh abrasion    Wound - Properties Group Date first assessed: 01/10/19 [CH] Time first assessed: 1925 [CH] Present On Admission : yes;picture taken [CH] Side: Right [CH] Orientation: posterior [CH] Location: thigh [CH] Type: abrasion [CH] Recorded by:  [CH] Jacqueline Munroe RN 01/11/19 0230    Row Name                Wound 01/10/19 1925    Wound - Properties Group Date first assessed: 01/10/19 [CH] Time first assessed: 1925 [CH] Present On Admission :  yes;picture taken [CH] Recorded by:  [CH] Jacqueline Munroe, RN 01/11/19 0302    Row Name                Wound 01/10/19 1925 abdomen    Wound - Properties Group Date first assessed: 01/10/19 [CH] Time first assessed: 1925 [CH] Present On Admission : yes;picture taken [CH] Location: abdomen [CH] Recorded by:  [CH] Jacqueline Munroe RN 01/11/19 0323    Row Name                [REMOVED] Wound 01/14/19 1400 Right foot incision    Wound - Properties Group Date first assessed: 01/14/19 [ADRIANA] Time first assessed: 1400 [ADRIANA] Side: Right [ADRIANA] Location: foot [ADRIANA] Type: incision [ADRIANA] Additional Comments: chartex x2; removed one  [BA] Resolution Date: 01/17/19 [BA] Resolution Time: 1031 [BA] Recorded by:  [BA] Kayley Mcnair RN 01/17/19 1031 [ADRIANA] Indio Spain RN 01/14/19 1400    Row Name 01/17/19 1340             Coping    Observed Emotional State  calm;cooperative  -SD      Verbalized Emotional State  acceptance  -SD      Recorded by [SD] Hannah Santos OT 01/17/19 1520      Row Name 01/17/19 1340             Plan of Care Review    Plan of Care Reviewed With  patient  -SD      Recorded by [SD] Hannah Santos OT 01/17/19 1520      Row Name 01/17/19 1340             Outcome Summary/Treatment Plan (OT)    Daily Summary of Progress (OT)  progress toward functional goals is gradual  -SD      Anticipated Discharge Disposition (OT)  inpatient rehabilitation facility  -SD      Recorded by [SD] Hannah Santos OT 01/17/19 1520      Row Name 01/17/19 1336             Outcome Summary/Treatment Plan (PT)    Daily Summary of Progress (PT)  progress towards functional goals is fair  -LM      Plan for Continued Treatment (PT)  Cont PT per POC to goals.  -LM      Anticipated Discharge Disposition (PT)  inpatient rehabilitation facility  -LM      Recorded by [LM] Manuela Gomez, PT 01/17/19 1516        User Key  (r) = Recorded By, (t) = Taken By, (c) = Cosigned By    Initials Name Effective Dates Discipline    CH Ludwig  Jacqueline, RN 11/07/16 -  Nurse    Manuela Acevedo, PT 04/03/18 -  PT    Kayley Duran, JANAE 10/26/16 -  Nurse    Hannah Williamson, OT 03/07/18 -  OT    Indio Downing RN 11/14/18 -  Nurse        Wound 01/10/19 1925 Right foot (Active)   Dressing Appearance dry;intact 1/17/2019 12:00 PM   Drainage Amount none 1/17/2019 12:00 PM       Wound 01/10/19 1925 Right lower;lateral leg abrasion (Active)   Dressing Appearance dry;intact 1/16/2019  4:00 PM   Base dressing in place, unable to visualize 1/17/2019 12:00 PM   Drainage Characteristics/Odor bleeding controlled 1/17/2019 10:00 AM   Drainage Amount none 1/17/2019 12:00 PM   Care, Wound cleansed with 1/17/2019 10:00 AM   Dressing Care, Wound silicone 1/17/2019 10:00 AM       Wound 01/10/19 1925 Left anterior;lower;proximal leg unspecified (Active)   Drainage Amount none 1/17/2019 12:00 PM   Dressing Care, Wound other (see comments) 1/17/2019  8:00 AM       Wound 01/10/19 1925 Left gluteal abrasion (Active)   Dressing Appearance intact 1/17/2019 12:00 PM   Base dressing in place, unable to visualize 1/16/2019  4:00 PM   Periwound pink 1/17/2019 10:00 AM   Periwound Temperature warm 1/17/2019 10:00 AM   Periwound Skin Turgor soft 1/17/2019 10:00 AM   Care, Wound cleansed with 1/17/2019 10:00 AM   Dressing Care, Wound silicone 1/17/2019 10:00 AM       Wound 01/10/19 1925 Right posterior thigh abrasion (Active)   Dressing Appearance intact 1/17/2019 12:00 PM   Base dressing in place, unable to visualize 1/16/2019  4:00 PM   Yellow (%), Wound Tissue Color 100 1/17/2019 10:00 AM   Wound Length (cm) 2 cm 1/17/2019 10:00 AM   Wound Width (cm) 2 cm 1/17/2019 10:00 AM   Wound Depth (cm) 0.1 cm 1/17/2019 10:00 AM   Drainage Characteristics/Odor serosanguineous 1/17/2019 10:00 AM   Drainage Amount none 1/17/2019 12:00 PM   Care, Wound cleansed with 1/17/2019 10:00 AM   Dressing Care, Wound silicone 1/17/2019 10:00 AM   Periwound Care, Wound barrier ointment applied  1/17/2019 10:00 AM       Wound 01/10/19 1925 (Active)   Drainage Amount none 1/17/2019 12:00 PM       Wound 01/10/19 1925 abdomen (Active)   Dressing Appearance open to air 1/17/2019 12:00 PM   Base scab;other (see comments) 1/16/2019  4:00 PM   Drainage Amount none 1/17/2019 12:00 PM     Rehab Goal Summary     Row Name 01/17/19 1340 01/17/19 1336 01/17/19 0905       Bed Mobility Goal 1 (PT)    Progress/Outcomes (Bed Mobility Goal 1, PT)  --  goal ongoing  -LM  --       Transfer Goal 1 (PT)    Progress/Outcome (Transfer Goal 1, PT)  --  goal ongoing  -LM  --       Patient Education Goal (PT)    Progress/Outcome (Patient Education Goal, PT)  --  goal ongoing  -LM  --       Bed Mobility Goal 1 (OT)    Progress/Outcomes (Bed Mobility Goal 1, OT)  goal ongoing  -SD  --  --       Grooming Goal 1 (OT)    Progress/Outcome (Grooming Goal 1, OT)  goal met  -SD  --  --       Strength Goal 1 (OT)    Progress/Outcome (Strength Goal 1, OT)  goal partially met  -SD  --  --       Balance Goal 1 (OT)    Progress/Outcomes (Balance Goal 1, OT)  goal ongoing  -SD  --  --       Swallow Goals (SLP)    Oral Nutrition/Hydration Goal Selection (SLP)  --  --  oral nutrition/hydration, SLP goal 1  -ES    Labial Strengthening Goal Selection (SLP)  --  --  labial strengthening, SLP goal 1  -ES    Lingual Strengthening Goal Selection (SLP)  --  --  lingual strengthening, SLP goal 1  -ES    Pharyngeal Strengthening Exercise Goal Selection (SLP)  --  --  pharyngeal strengthening exercise, SLP goal 1  -ES    Additional Documentation  --  --  pharyngeal strengthening exercise goal selection (SLP)  -ES       Oral Nutrition/Hydration Goal 1 (SLP)    Oral Nutrition/Hydration Goal 1, SLP  --  --  Consume mech soft diet texture w/ thin liquids w/ no s/s aspiration  -ES    Time Frame (Oral Nutrition/Hydration Goal 1, SLP)  --  --  by discharge  -ES    Progress/Outcomes (Oral Nutrition/Hydration Goal 1, SLP)  --  --  goal revised this date;goal ongoing  diet upgraded to puree w/ NTL  -ES       Labial Strengthening Goal 1 (SLP)    Activity (Labial Strengthening Goal 1, SLP)  --  --  increase labial tone  -ES    Increase Labial Tone  --  --  labial resistance exercises;swallow trials  -ES    [place]/Accuracy (Labial Strengthening Goal 1, SLP)  --  --  independently (over 90% accuracy)  -ES    Time Frame (Labial Strengthening Goal 1, SLP)  --  --  by discharge  -ES    Progress/Outcomes (Labial Strengthening Goal 1, SLP)  --  --  goal ongoing  -ES       Lingual Strengthening Goal 1 (SLP)    Activity (Lingual Strengthening Goal 1, SLP)  --  --  increase lingual tone/sensation/control/coordination/movement;increase tongue back strength  -ES    Increase Lingual Tone/Sensation/Control/Coordination/Movement  --  --  lingual movement exercises;swallow trials;lingual resistance exercises  -ES    Increase Tongue Back Strength  --  --  lingual movement exercises;swallow trials;lingual resistance exercises  -ES    [place]/Accuracy (Lingual Strengthening Goal 1, SLP)  --  --  with minimal cues (75-90% accuracy)  -ES    Time Frame (Lingual Strengthening Goal 1, SLP)  --  --  by discharge  -ES    Progress/Outcomes (Lingual Strengthening Goal 1, SLP)  --  --  goal ongoing  -ES       Pharyngeal Strengthening Exercise Goal 1 (SLP)    Activity (Pharyngeal Strengthening Goal 1, SLP)  --  --  increase timing thin liquid trials  -ES    Increase Timing  --  --  gustatory stimulation (sour/cold);hard effortful swallow  -ES    [place]/Accuracy (Pharyngeal Strengthening Goal 1, SLP)  --  --  with minimal cues (75-90% accuracy)  -ES    Time Frame (Pharyngeal Strengthening Goal 1, SLP)  --  --  by discharge  -ES    Progress/Outcomes (Pharyngeal Strengthening Goal 1, SLP)  --  --  goal ongoing  -ES      User Key  (r) = Recorded By, (t) = Taken By, (c) = Cosigned By    Initials Name Provider Type Discipline    Manuela Acevedo, PT Physical Therapist PT    Jesika Garcia, MS  CCC-SLP Speech and Language Pathologist SLP    Hannah Williamson OT Occupational Therapist OT        Occupational Therapy Education     Title: PT OT SLP Therapies (In Progress)     Topic: Occupational Therapy (In Progress)     Point: ADL training (Done)     Description: Instruct learner(s) on proper safety adaptation and remediation techniques during self care or transfers.   Instruct in proper use of assistive devices.    Learning Progress Summary           Patient Acceptance, E,TB, VU by SD at 1/17/2019  3:23 PM    Comment:  Safety and sequencing during grooming tasks to increase independence.    Acceptance, E, NR by EB at 1/17/2019 10:30 AM    Comment:  pt alert and Dr Reyes at bedside and discussed plan of care with patient.  No family at bedside.    Acceptance, E,TB, VU by SD at 1/16/2019  1:20 PM    Comment:  Benefit of OT; OT POC                   Point: Home exercise program (In Progress)     Description: Instruct learner(s) on appropriate technique for monitoring, assisting and/or progressing therapeutic exercises/activities.    Learning Progress Summary           Patient Acceptance, E, NR by EB at 1/17/2019 10:30 AM    Comment:  pt alert and Dr Reyes at bedside and discussed plan of care with patient.  No family at bedside.                   Point: Precautions (In Progress)     Description: Instruct learner(s) on prescribed precautions during self-care and functional transfers.    Learning Progress Summary           Patient Acceptance, E, NR by EB at 1/17/2019 10:30 AM    Comment:  pt alert and Dr Reyes at bedside and discussed plan of care with patient.  No family at bedside.                   Point: Body mechanics (In Progress)     Description: Instruct learner(s) on proper positioning and spine alignment during self-care, functional mobility activities and/or exercises.    Learning Progress Summary           Patient Acceptance, E, NR by EB at 1/17/2019 10:30 AM    Comment:  pt alert and Dr Reyes at  bedside and discussed plan of care with patient.  No family at bedside.                               User Key     Initials Effective Dates Name Provider Type Discipline    EB 11/07/16 -  Viv Valente, RN Registered Nurse Nurse    SD 03/07/18 -  Hannah Santos OT Occupational Therapist OT                OT Recommendation and Plan  Outcome Summary/Treatment Plan (OT)  Daily Summary of Progress (OT): progress toward functional goals is gradual  Anticipated Discharge Disposition (OT): inpatient rehabilitation facility  Planned Therapy Interventions (OT Eval): activity tolerance training, adaptive equipment training, BADL retraining, patient/caregiver education/training, strengthening exercise, transfer/mobility retraining  Therapy Frequency (OT Eval): daily(Monday-Friday)  Daily Summary of Progress (OT): progress toward functional goals is gradual  Plan of Care Review  Plan of Care Reviewed With: patient  Plan of Care Reviewed With: patient  Outcome Summary: OT tx completed. Patient presented improved performance with grooming tasks; trunk strengthening in bed and UB AAROM. Encouraged patient to do more and she was too nauseated.   Outcome Measures     Row Name 01/17/19 1340 01/17/19 1336 01/16/19 1111       How much help from another person do you currently need...    Turning from your back to your side while in flat bed without using bedrails?  --  1  -LM  --    Moving from lying on back to sitting on the side of a flat bed without bedrails?  --  1  -LM  --    Moving to and from a bed to a chair (including a wheelchair)?  --  1  -LM  --    Standing up from a chair using your arms (e.g., wheelchair, bedside chair)?  --  1  -LM  --    Climbing 3-5 steps with a railing?  --  1  -LM  --    To walk in hospital room?  --  1  -LM  --    AM-PAC 6 Clicks Score  --  6  -LM  --       How much help from another is currently needed...    Putting on and taking off regular lower body clothing?  1  -SD  --  1  -SD    Bathing  (including washing, rinsing, and drying)  1  -SD  --  1  -SD    Toileting (which includes using toilet bed pan or urinal)  1  -SD  --  1  -SD    Putting on and taking off regular upper body clothing  2  -SD  --  1  -SD    Taking care of personal grooming (such as brushing teeth)  2  -SD  --  1  -SD    Eating meals  1  -SD  --  1  -SD    Score  8  -SD  --  6  -SD       Functional Assessment    Outcome Measure Options  AM-PAC 6 Clicks Daily Activity (OT)  -SD  AM-PAC 6 Clicks Basic Mobility (PT)  -LM  AM-PAC 6 Clicks Daily Activity (OT)  -SD    Row Name 01/16/19 1109             How much help from another person do you currently need...    Turning from your back to your side while in flat bed without using bedrails?  1  -LM      Moving from lying on back to sitting on the side of a flat bed without bedrails?  1  -LM      Moving to and from a bed to a chair (including a wheelchair)?  1  -LM      Standing up from a chair using your arms (e.g., wheelchair, bedside chair)?  1  -LM      Climbing 3-5 steps with a railing?  1  -LM      To walk in hospital room?  1  -LM      AM-PAC 6 Clicks Score  6  -LM         Functional Assessment    Outcome Measure Options  AM-PAC 6 Clicks Basic Mobility (PT)  -LM        User Key  (r) = Recorded By, (t) = Taken By, (c) = Cosigned By    Initials Name Provider Type    LM Manuela Gomez, PT Physical Therapist    Hannah Wililamson OT Occupational Therapist           Time Calculation:   Time Calculation- OT     Row Name 01/17/19 1524             Time Calculation- OT    OT Start Time  1340  -SD      Total Timed Code Minutes- OT  15 minute(s)  -SD      OT Received On  01/17/19  -SD      OT Goal Re-Cert Due Date  01/26/19  -SD         Timed Charges    35463 - OT Therapeutic Activity Minutes  10  -SD      41920 - OT Self Care/Mgmt Minutes  5  -SD        User Key  (r) = Recorded By, (t) = Taken By, (c) = Cosigned By    Initials Name Provider Type    Hannah Williamson, OT Occupational Therapist            Therapy Suggested Charges     Code   Minutes Charges    56023 (CPT®) Hc Ot Neuromusc Re Education Ea 15 Min      14713 (CPT®) Hc Ot Ther Proc Ea 15 Min      28114 (CPT®) Hc Ot Therapeutic Act Ea 15 Min 10 1    58112 (CPT®) Hc Ot Manual Therapy Ea 15 Min      07398 (CPT®) Hc Ot Iontophoresis Ea 15 Min      55857 (CPT®) Hc Ot Elec Stim Ea-Per 15 Min      58586 (CPT®) Hc Ot Ultrasound Ea 15 Min      51257 (CPT®) Hc Ot Self Care/Mgmt/Train Ea 15 Min 5     Total  15 1        Therapy Charges for Today     Code Description Service Date Service Provider Modifiers Qty    57557115561 HC OT EVAL MOD COMPLEXITY 4 1/16/2019 Hannah Santos OT GO 1    42548590991 HC OT THERAPEUTIC ACT EA 15 MIN 1/17/2019 Hannah Santos OT GO 1               Hannah Santos OT  1/17/2019    Electronically signed by Hannah Santos OT at 1/17/2019  3:25 PM     Hannah Santos OT at 1/17/2019  3:22 PM          Problem: Patient Care Overview  Goal: Plan of Care Review  Outcome: Ongoing (interventions implemented as appropriate)   01/17/19 1521   Coping/Psychosocial   Plan of Care Reviewed With patient   Plan of Care Review   Progress improving   OTHER   Outcome Summary OT tx completed. Patient presented improved performance with grooming tasks; trunk strengthening in bed and UB AAROM. Encouraged patient to do more and she was too nauseated.            Electronically signed by Hannah Santos OT at 1/17/2019  3:22 PM       Respiratory Therapy Notes (last 24 hours) (Notes from 1/17/2019 10:30 AM through 1/18/2019 10:30 AM)     No notes of this type exist for this encounter.

## 2019-01-18 NOTE — PROGRESS NOTES
"Nephrology Progress Note.    LOS: 8 days    Patient Care Team:  Rosa Zamora MD as PCP - General (Internal Medicine)  Geremias Shepherd MD as Consulting Physician (General Surgery)    Chief Complaint:  No chief complaint on file.      Subjective     Follow up for FREDDY and related issues.    Interval History:     Patient Complaints: none    Patient seen and examined this morning.  Events from last night noted.  She is doing much better not more alert and interactive.  She is able to communicate keep a conversation, she's been getting to feeding then having persistent diarrhea and multiple bouts of vomiting as well.  She did say she is able to eat, but keeps on saying she wants to go home.    Review of Systems:    She feels fair at this point.      Objective     Vital Signs  /79 (BP Location: Left arm, Patient Position: Lying)   Pulse 76   Temp 97.8 °F (36.6 °C) (Oral)   Resp 16   Ht 165.1 cm (65\")   Wt (!) 145 kg (320 lb 3 oz)   SpO2 95%   BMI 53.28 kg/m²       I/O this shift:  In: 100 [P.O.:100]  Out: -     Intake/Output Summary (Last 24 hours) at 1/18/2019 1027  Last data filed at 1/18/2019 0752  Gross per 24 hour   Intake 626 ml   Output 1405 ml   Net -779 ml       Physical Exam:    General Appearance:  no acute distress,   HEENT: Oral mucosa dry, extra occular movements intact. Sclera clear.  Skin: Warm and dry  Neck: supple, no JVD, trachea midline  Lungs:Chest shape is normal. Breath sounds heard bilaterally equally. No crackles, No wheezing.   Heart: regular rate and rhythm. normal S1 and S2, no S3, no rub, peripheral pulses weak but palpable.  Abdomen: Obese, soft, non-tender,  present bowel sounds to auscultation  : no palpable bladder.  Extremities: 1-2+ edema, no cyanosis or clubbing.   Neuro: She does have interaction and move her extremities randomly.     Results Review:    Results from last 7 days   Lab Units 01/18/19  0515 01/17/19  0428 01/16/19  0537  01/14/19  0414 01/13/19  0423 "   SODIUM mmol/L 143 141 138   < > 138 138   POTASSIUM mmol/L 3.4* 3.5 3.9   < > 4.2 4.8   CHLORIDE mmol/L 112* 109* 109*   < > 109* 108*   CO2 mmol/L 25.0* 22.0* 25.0*   < > 19.0* 18.0*   BUN mg/dL 65* 61* 77*   < > 82* 73*   CREATININE mg/dL 1.90* 2.10* 2.70*   < > 4.00* 3.80*   CALCIUM mg/dL 8.5 8.4 7.8*   < > 7.9* 8.1*   BILIRUBIN mg/dL  --   --   --   --  0.4 0.4   ALK PHOS U/L  --   --   --   --  272* 323*   ALT (SGPT) U/L  --   --   --   --  27 34   AST (SGOT) U/L  --   --   --   --  16 21   GLUCOSE mg/dL 156* 208* 253*   < > 181* 202*    < > = values in this interval not displayed.       Estimated Creatinine Clearance: 45.8 mL/min (A) (by C-G formula based on SCr of 1.9 mg/dL (H)).    Results from last 7 days   Lab Units 01/16/19  0537 01/12/19  0431   MAGNESIUM mg/dL 2.5*  --    PHOSPHORUS mg/dL  --  7.9*       Results from last 7 days   Lab Units 01/12/19  0431   URIC ACID mg/dL 4.4       Results from last 7 days   Lab Units 01/18/19  0515 01/17/19  0428 01/16/19  0537 01/15/19  0418 01/14/19  0414   WBC 10*3/mm3 7.61 11.00* 13.99* 19.82* 18.96*   HEMOGLOBIN g/dL 8.4* 8.2* 8.4* 8.1* 8.2*   PLATELETS 10*3/mm3 139 108* 79* 83* 102*       Results from last 7 days   Lab Units 01/13/19  0901   INR  1.41*         Imaging Results (last 24 hours)     ** No results found for the last 24 hours. **          ertapenem 500 mg Intravenous Q24H   famotidine 20 mg Intravenous Daily   heparin (porcine) 5,000 Units Subcutaneous Q12H   hydrALAZINE 25 mg Nasogastric Q8H   hydrocortisone sodium succinate 50 mg Intravenous Q12H   insulin aspart 0-7 Units Subcutaneous 4x Daily AC & at Bedtime   insulin detemir 30 Units Subcutaneous QAM   lactobacillus acidophilus 1 capsule Nasogastric Daily   levothyroxine 100 mcg Nasogastric Q AM   sodium chloride 3 mL Intravenous Q12H       sodium chloride 10 mL/hr Last Rate: 10 mL/hr (01/18/19 0054)       Medication Review:   Current Facility-Administered Medications   Medication Dose Route  Frequency Provider Last Rate Last Dose   • acetaminophen (TYLENOL) tablet 650 mg  650 mg Oral Q4H PRN Milad Leone MD, FASN       • CHAPSTICK 1 each  1 each Apply externally Q1H PRN Samson Reyes MD   1 each at 01/16/19 1155   • dextrose (D50W) 25 g/ 50mL Intravenous Solution 25 g  25 g Intravenous Q15 Min PRN Milad Leone MD, FASN       • dextrose (GLUTOSE) oral gel 1 tube  1 tube Oral Q15 Min PRN Milad Leone MD, FASN       • ertapenem (INVanz) 500 mg in Sodium chloride 0.9 % 50 mL IVPB  500 mg Intravenous Q24H Samson Reyes  mL/hr at 01/17/19 1440 500 mg at 01/17/19 1440   • famotidine (PEPCID) injection 20 mg  20 mg Intravenous Daily Liz Gregg MD   20 mg at 01/18/19 0814   • glucagon (human recombinant) (GLUCAGEN DIAGNOSTIC) injection 1 mg  1 mg Subcutaneous PRN Milad Leone MD, SHANIN       • glycerin 35 % liquid 35% 2 spray  2 spray Mouth/Throat Q2H PRN Maribel Wheeler DO   2 spray at 01/16/19 0857   • heparin (porcine) 5000 UNIT/ML injection 5,000 Units  5,000 Units Subcutaneous Q12H Samson Reyes MD   5,000 Units at 01/18/19 1000   • heparin (porcine) injection 2,400 Units  2,400 Units Intracatheter PRN Milad Leone MD, FASN   2,400 Units at 01/15/19 1502   • hydrALAZINE (APRESOLINE) injection 10 mg  10 mg Intravenous Q6H PRN Liz Gregg MD   10 mg at 01/18/19 0341   • hydrALAZINE (APRESOLINE) tablet 25 mg  25 mg Nasogastric Q8H Samson Reyes MD   25 mg at 01/18/19 0609   • hydrocortisone sodium succinate (Solu-CORTEF) injection 50 mg  50 mg Intravenous Q12H Liz Gregg MD   50 mg at 01/18/19 0159   • insulin aspart (novoLOG) injection 0-7 Units  0-7 Units Subcutaneous 4x Daily AC & at Bedtime Samson Reyes MD   2 Units at 01/18/19 0651   • insulin detemir (LEVEMIR) injection 30 Units  30 Units Subcutaneous QAM Samson Reyes MD   30 Units at 01/18/19 0652   • lactobacillus acidophilus (RISAQUAD) capsule 1 capsule  1 capsule Nasogastric Daily Samson Reyes MD    1 capsule at 01/18/19 0814   • levothyroxine (SYNTHROID, LEVOTHROID) tablet 100 mcg  100 mcg Nasogastric Q AM Samson Reyes MD   100 mcg at 01/18/19 0600   • LORazepam (ATIVAN) injection 0.5 mg  0.5 mg Intravenous Q6H PRN Samson Reyes MD       • Morphine sulfate (PF) injection 2 mg  2 mg Intravenous Q4H PRN Milad Leone MD, FASN        And   • naloxone (NARCAN) injection 0.4 mg  0.4 mg Intravenous Q5 Min PRN Milad Leone MD, FASN       • ondansetron (ZOFRAN) tablet 4 mg  4 mg Oral Q6H PRN Milad Leone MD, FASN        Or   • ondansetron ODT (ZOFRAN-ODT) disintegrating tablet 4 mg  4 mg Oral Q6H PRN Milad Leone MD, FASN        Or   • ondansetron (ZOFRAN) injection 4 mg  4 mg Intravenous Q6H PRN Milad Leone MD, FASN   4 mg at 01/18/19 0954   • promethazine (PHENERGAN) injection 12.5 mg  12.5 mg Intravenous Q6H PRN Samson Reyes MD   12.5 mg at 01/17/19 1432   • sodium chloride 0.9 % bolus 1,000 mL  1,000 mL Intravenous PRN Milad Leone MD, FASN       • sodium chloride 0.9 % flush 1-10 mL  1-10 mL Intravenous PRN Milad Leone MD, FASN       • sodium chloride 0.9 % flush 3 mL  3 mL Intravenous Q12H Milad Leone MD, FASN   3 mL at 01/17/19 2203   • Sodium chloride 0.9 % infusion  10 mL/hr Intravenous Continuous Milad Leone MD, FASN 10 mL/hr at 01/18/19 0054 10 mL/hr at 01/18/19 0054   • zinc oxide 13 % cream   Topical BID PRN Milad Leone MD, FASN           Assessment/Plan         1.   Acute on chronic renal failure (CMS/HCC): It is likely secondary to hypotensive episodes as well as the use of Bactrim.  There is possibility that she may be taking nonsteroidals and other hemodynamic abnormality is causing the current problem.  There is no bloody around at this time who can help make the decisions or tell us what was done at home.  Finally I have came to know about that she has a blind sister both of them try to work together to take care of each other.  Her sister is on dialysis and is my  patient.  2.   Cellulitis of both lower extremities: Continue with the IV antibiotics.  She likely has an abscess on the foot that needs to be addressed  3.   Altered mental status: There is some improvement in her mental status.  4.   Anemia: Transfused as needed  5.   Bilateral edema of lower extremity:  she needs a central line placed the temporary dialysis catheter and use it as a central line and likely will need dialysis in the morning.  6.   GERD (gastroesophageal reflux disease)  7.   Hyperkalemia:  it is resolved at this point  8.   Hypertension: Under fair control  9.   Hypothermia  10.   Hypothyroidism  11.   Type 2 diabetes mellitus with complication (CMS/[facility])  12.   Vitamin B12 deficiency    Plan:    · She has good urine output, no dialysis needed today.  · Have to reassess her volume status and likely will need some form of diuretics.  · Blood pressure is fairly stable with the hydralazine can be continued.  · Clinically starting to improve.  · Details were also discussed with the hospitalist service.    · Surveillance labs.  · Further recommendations will depend on clinical course of the patient during the current hospitalization.    · I also discussed the details with the nursing staff.  · Rest as ordered.    Milad Leone MD, FASN  01/18/19  10:27 AM    Dictated utilizing Dragon dictation.

## 2019-01-18 NOTE — THERAPY TREATMENT NOTE
Acute Care - Occupational Therapy Treatment Note  [facility]     Patient Name: Millicent Mckeon  : 1960  MRN: [id number]  Today's Date: 2019  Onset of Illness/Injury or Date of Surgery: 19  Date of Referral to OT: 19  Referring Physician: Mohit    Admit Date: 1/10/2019       ICD-10-CM ICD-9-CM   1. Altered mental status, unspecified altered mental status type R41.82 780.97   2. Cellulitis of both lower extremities L03.115 682.6    L03.116    3. Impaired functional mobility, balance, gait, and endurance Z74.09 V49.89   4. Impaired mobility and ADLs Z74.09 799.89   5. Dysphagia, oropharyngeal phase R13.12 787.22     Patient Active Problem List   Diagnosis   • Altered mental status   • Anemia   • Bilateral edema of lower extremity   • Cellulitis of lower extremity   • Acute on chronic renal failure (CMS/HCC)   • GERD (gastroesophageal reflux disease)   • Hyperkalemia   • Essential hypertension   • Hypothermia   • Hypothyroidism   • Type 2 diabetes mellitus with complication (CMS/HCC)   • Vitamin B12 deficiency   • Cellulitis of both lower extremities   • Acute metabolic encephalopathy     Past Medical History:   Diagnosis Date   • Diabetes mellitus (CMS/HCC)    • Disease of thyroid gland    • GERD (gastroesophageal reflux disease)    • Hypertension      Past Surgical History:   Procedure Laterality Date   • EYE SURGERY     • INCISION AND DRAINAGE LEG Right 2019    Procedure: Right heel incision and drainage with graft application;  Surgeon: Ar Rueda DPM;  Location: [facility];  Service: Podiatry   • LEG SURGERY         Therapy Treatment    Rehabilitation Treatment Summary     Row Name 19 1010 19 0925 19 0923       Treatment Time/Intention    Discipline  speech language pathologist  -TM  occupational therapist  -SD  physical therapist  -LM    Document Type  therapy note (daily note)  -TM  therapy note (daily note)  -SD  therapy note (daily note)  -LM    Subjective  Information  no complaints  -TM  complains of;weakness  -SD  no complaints  -LM    Mode of Treatment  speech-language pathology;individual therapy  -TM  occupational therapy  -SD  physical therapy  -LM    Patient/Family Observations  flat affect; pt. repeatedly stated that she was ready to go home  -TM  --  Pt received supine in bed.  -LM    Care Plan Review  care plan/treatment goals reviewed  -TM  care plan/treatment goals reviewed  -SD  care plan/treatment goals reviewed;patient/other agree to care plan  -LM    Therapy Frequency (PT Clinical Impression)  --  --  daily  -LM    Total Evaluation Minutes, SLP  13  -TM  --  --    Therapy Frequency (Swallow)  PRN  -TM  --  --    Patient Effort  adequate  -TM  good  -SD  good  -LM    Existing Precautions/Restrictions  -- swallowing precautions  -TM  --  non-weight bearing;right  -LM    Treatment Considerations/Comments  weakness  -TM  --  --    Patient Response to Treatment  tolerated well & participated effectively with encouragement  -TM  --  Increased alertness and participation today.  -LM    Recorded by [TM] Bhavani Wood 01/18/19 1121 [SD] Hannah Santos, OT 01/18/19 1444 [LM] Manuela Gomez, PT 01/18/19 1240    Row Name 01/18/19 1010 01/18/19 0925          Vital Signs    O2 Delivery Pre Treatment  --  room air  -SD     O2 Delivery Intra Treatment  --  room air  -SD     O2 Delivery Post Treatment  --  room air  -SD     Pre Patient Position  Sitting  -TM  --     Intra Patient Position  Sitting  -TM  --     Post Patient Position  Sitting  -TM  --     Recorded by [TM] Bhavani Wood 01/18/19 1121 [SD] Hannah Santos, OT 01/18/19 1444     Row Name 01/18/19 1010             Swallow Assessment/Intervention    Additional Documentation  General Eating/Swallowing Observations;Oral Motor Structure and Function  -TM      Recorded by [TM] Bhavani Wood 01/18/19 1121      Row Name 01/18/19 1010             Oral Motor and Function    Dentition Assessment  edentulous,  does not have dentures  -TM      Secretion Management  WNL/WFL  -TM      Mucosal Quality  dry  -TM      Volitional Cough  WFL  -TM      Recorded by [TM] Sublette Bhavani 01/18/19 1121      Row Name 01/18/19 1010             Oral Musculature and Cranial Nerve Assessment    Oral Motor General Assessment  oral labial or buccal impairment;lingual impairment  -TM      Oral Labial or Buccal Impairment, Detail, Cranial Nerve VII (Facial):  CN7: Motor Impairment;reduced ROM;reduced strength bilaterally;left labial droop  -TM      Lingual Impairment, Detail. Cranial Nerves IX, XII (Glossopharyngeal and Hypoglossal)  CN12: Motor Impairment;reduced lingual ROM;reduced strength;reduced strength left  -TM      Recorded by [TM] Nina Bhavani 01/18/19 1121      Row Name 01/18/19 1010             General Eating/Swallowing Observations    Eating/Swallowing Skills  fed by SLP  -TM      Positioning During Eating  upright 90 degree;upright in bed  -TM      Utensils Used  spoon;cup  -TM      Consistencies Trialed  pureed;nectar/syrup-thick liquids  -TM      Recorded by [TM] Nina Bhavani 01/18/19 1121      Row Name 01/18/19 1010             Oral Prep Concerns    Oral Prep Concerns  incomplete or weak lip closure around spoon;spits out food prior to swallow;increased prep time  -TM      Incomplete or Weak Lip Closure Around Spoon  all consistencies  -TM      Increased Prep Time  other (see comments) all trialed (puree with nectar-thick liq)  -TM      Recorded by [TM] Sublette Bhavani 01/18/19 1121      Row Name 01/18/19 1010             Oral Transit Concerns    Oral Transit Concerns  increased oral transit time  -TM      Increased Oral Transit Time  all consistencies;other (see comments) all trialed (puree with nectar)  -TM      Recorded by [TM] Nina Bhavani 01/18/19 1121      Row Name 01/18/19 1010             Pharyngeal Phase Concerns    Pharyngeal Phase Concerns  other (see comments) aspiration with thin is concern;no s/s  asp. w/puree & nectar  -TM      Recorded by [TM] Palmetto Bhavani 01/18/19 1121      Row Name 01/18/19 1010             Clinical Impression    SLP Swallowing Diagnosis  moderate;oral dysfunction;pharyngeal dysfunction  -TM      Functional Impact  risk of aspiration/pneumonia  -TM      Rehab Potential/Prognosis, Swallowing  adequate, monitor progress closely  -TM      Swallow Criteria for Skilled Therapeutic Interventions Met  demonstrates skilled criteria  -TM      Recorded by [TM] Palmetto Bhavani 01/18/19 1121      Row Name 01/18/19 1010             Recommendations    Predicted Duration Therapy Intervention (Days)  until discharge  -TM      SLP Diet Recommendation  puree;nectar thick liquids  -TM      Recommended Diagnostics  VFSS (MBS)  -TM      Recommended Precautions and Strategies  upright posture during/after eating;small bites of food and sips of liquid  -TM      SLP Rec. for Method of Medication Administration  meds crushed;with pudding or applesauce  -TM      Monitor for Signs of Aspiration  yes;notify SLP if any concerns;cough  -TM      Recorded by [TM] Nina Bhavani 01/18/19 1121      Row Name 01/18/19 0923             Safety Issues, Functional Mobility    Safety Issues Affecting Function (Mobility)  safety precautions follow-through/compliance  -LM      Recorded by [LM] Manuela Gomez, PT 01/18/19 1240      Row Name 01/18/19 0923             Mobility Assessment/Intervention    Extremity Weight-bearing Status  right lower extremity  -LM      Right Lower Extremity (Weight-bearing Status)  non weight-bearing (NWB)  -LM      Recorded by [LM] Manuela Gomez, PT 01/18/19 1240      Row Name 01/18/19 0925 01/18/19 0923          Bed Mobility Assessment/Treatment    Bed Mobility Assessment/Treatment  supine-sit;sit-supine  -SD  supine-sit;sit-supine  -LM     Supine-Sit [place] (Bed Mobility)  maximum assist (25% patient effort);2 person assist  -SD  maximum assist (25% patient effort);2 person assist  -LM      Sit-Supine [place] (Bed Mobility)  maximum assist (25% patient effort);2 person assist  -SD  maximum assist (25% patient effort);2 person assist  -LM     Bed Mobility, Safety Issues  decreased use of arms for pushing/pulling;decreased use of legs for bridging/pushing;impaired trunk control for bed mobility  -SD  decreased use of arms for pushing/pulling;decreased use of legs for bridging/pushing;impaired trunk control for bed mobility  -LM     Assistive Device (Bed Mobility)  bed rails;draw sheet;head of bed elevated  -SD  bed rails;draw sheet;head of bed elevated  -LM     Comment (Bed Mobility)  Sat EOB x 15 mins  -SD  Pt tolerated sitting EOB x 15 minutes.  -LM     Recorded by [SD] Hannah Santos, OT 01/18/19 1444 [LM] Manuela Gomez, PT 01/18/19 1240     Row Name 01/18/19 0923             Transfer Assessment/Treatment    Comment (Transfers)  Unable to assess.  -LM      Recorded by [LM] Manuela Gomez, PT 01/18/19 1240      Row Name 01/18/19 0923             Gait/Stairs Assessment/Training    Comment (Gait/Stairs)  Unable to assess.  -LM      Recorded by [LM] Manuela Gomez, PT 01/18/19 1240      Row Name 01/18/19 0925             Grooming Assessment/Training    [place] Level (Grooming)  wash face, hands;minimum assist (75% patient effort)  -SD      Recorded by [SD] Hannah Santos OT 01/18/19 1444      Row Name 01/18/19 0925             Therapeutic Exercise    Upper Extremity Range of Motion (Therapeutic Exercise)  shoulder flexion/extension, bilateral;shoulder abduction/adduction, bilateral;shoulder horizontal abduction/adduction, bilateral;elbow flexion/extension, bilateral  -SD      Exercise Type (Therapeutic Exercise)  AAROM (active assistive range of motion)  -SD      Position (Therapeutic Exercise)  seated  -SD      Sets/Reps (Therapeutic Exercise)  1 x 15  -SD      Expected Outcome (Therapeutic Exercise)  improve functional tolerance, self-care activity  -SD      Recorded by [SD] Danielle  Hannah, OT 01/18/19 1444      Row Name 01/18/19 0925 01/18/19 0923          Positioning and Restraints    Pre-Treatment Position  in bed  -SD  in bed  -LM     Post Treatment Position  bed  -SD  bed  -LM     In Bed  fowlers;call light within reach;encouraged to call for assist  -SD  fowlers;call light within reach;encouraged to call for assist  -LM     Recorded by [SD] Hannah Santos, OT 01/18/19 1444 [LM] Manuela Gomez, PT 01/18/19 1240     Row Name 01/18/19 0925 01/18/19 0923          Pain Scale: Numbers Pre/Post-Treatment    Pain Scale: Numbers, Pretreatment  0/10 - no pain  -SD  0/10 - no pain  -LM     Pain Scale: Numbers, Post-Treatment  0/10 - no pain  -SD  0/10 - no pain  -LM     Recorded by [SD] Hannah Santos, OT 01/18/19 1444 [LM] Manuela Gomez, PT 01/18/19 1240     Row Name                Wound 01/10/19 1925 Right foot    Wound - Properties Group Date first assessed: 01/10/19 [CH] Time first assessed: 1925 [CH] Side: Right [CH] Location: foot [CH] Stage, Pressure Injury: deep tissue injury;unstageable [CH] Additional Comments: right heel. [CH2] Recorded by:  [CH] Jacqueline Munroe RN 01/11/19 0215 [CH2] Jacqueline Munroe RN 01/11/19 0225    Row Name                Wound 01/10/19 1925 Right lower;lateral leg abrasion    Wound - Properties Group Date first assessed: 01/10/19 [CH] Time first assessed: 1925 [CH] Side: Right [CH] Orientation: lower;lateral [CH] Location: leg [CH] Type: abrasion [CH] Stage, Pressure Injury: Stage 1 [CH] Recorded by:  [CH] Jacqueline Munroe RN 01/11/19 0217    Row Name                Wound 01/10/19 1925 Left anterior;lower;proximal leg unspecified    Wound - Properties Group Date first assessed: 01/10/19 [CH] Time first assessed: 1925 [CH] Side: Left [CH] Orientation: anterior;lower;proximal [CH] Location: leg [CH] Type: unspecified [CH] Recorded by:  [CH] Jacqueline Munroe, RN 01/11/19 0221    Row Name                Wound 01/10/19 1925 Left gluteal abrasion    Wound - Properties  Group Date first assessed: 01/10/19 [CH] Time first assessed: 1925 [CH] Present On Admission : yes;picture taken [CH] Side: Left [CH] Location: gluteal [CH] Type: abrasion [CH] Recorded by:  [CH] Jacqueline Munroe RN 01/11/19 0224    Row Name                Wound 01/10/19 1925 Right posterior thigh abrasion    Wound - Properties Group Date first assessed: 01/10/19 [CH] Time first assessed: 1925 [CH] Present On Admission : yes;picture taken [CH] Side: Right [CH] Orientation: posterior [CH] Location: thigh [CH] Type: abrasion [CH] Recorded by:  [CH] Jacqueline Munroe RN 01/11/19 0230    Row Name                Wound 01/10/19 1925    Wound - Properties Group Date first assessed: 01/10/19 [CH] Time first assessed: 1925 [CH] Present On Admission : yes;picture taken [CH] Recorded by:  [CH] Jacqueline Munroe RN 01/11/19 0302    Row Name                Wound 01/10/19 1925 abdomen    Wound - Properties Group Date first assessed: 01/10/19 [CH] Time first assessed: 1925 [CH] Present On Admission : yes;picture taken [CH] Location: abdomen [CH] Recorded by:  [CH] Jacqueline Munroe RN 01/11/19 0323    Row Name                NPWT (Negative Pressure Wound Therapy) 01/18/19 0901 R heel    NPWT (Negative Pressure Wound Therapy) - Properties Group Placement Date: 01/18/19 [LM] Placement Time: 0901 [LM] Location: R heel [LM] Recorded by:  [LM] Manuela Gomez, PT 01/18/19 1224    Row Name 01/18/19 0925             Coping    Observed Emotional State  calm;cooperative  -SD      Verbalized Emotional State  acceptance  -SD      Recorded by [SD] Hannah Santos OT 01/18/19 1444      Row Name 01/18/19 0925             Plan of Care Review    Plan of Care Reviewed With  patient  -SD      Recorded by [SD] Hannah Santos OT 01/18/19 1444      Row Name 01/18/19 0925             Outcome Summary/Treatment Plan (OT)    Daily Summary of Progress (OT)  progress towards functional goals is fair  -SD      Anticipated Discharge Disposition (OT)  inpatient  rehabilitation facility  -SD      Recorded by [SD] Hannah Santos, OT 01/18/19 1444      Row Name 01/18/19 0923             Outcome Summary/Treatment Plan (PT)    Daily Summary of Progress (PT)  progress toward functional goals is gradual  -LM      Plan for Continued Treatment (PT)  Cont PT per POC.  -LM      Anticipated Discharge Disposition (PT)  inpatient rehabilitation facility  -LM      Recorded by [LM] Manuela Gomez, PT 01/18/19 1240      Row Name 01/18/19 1010             Outcome Summary/Treatment Plan (SLP)    Daily Summary of Progress (SLP)  progress toward functional goals is gradual  -TM      Barriers to Overall Progress (SLP)  medical complexity  -TM      Plan for Continued Treatment (SLP)  pending MBS results  -TM      Recorded by [TM] Bhavani Wood 01/18/19 1121        User Key  (r) = Recorded By, (t) = Taken By, (c) = Cosigned By    Initials Name Effective Dates Discipline    CH Jacqueline Munroe RN 11/07/16 -  Nurse    LM Manuela Gomez, PT 04/03/18 -  PT    TM Bhavani Wood 03/07/18 -  SLP    SD Hannah Santos, OT 03/07/18 -  OT        Wound 01/10/19 1925 Right foot (Active)   Dressing Appearance dressing loose;other (see comments) 1/18/2019  9:01 AM   Closure Adhesive bandage 1/18/2019  8:05 AM   Base maroon/purple;black eschar 1/18/2019  9:01 AM   Black (%), Wound Tissue Color 20 1/18/2019  9:01 AM   Red (%), Wound Tissue Color 80 1/18/2019  9:01 AM   Wound Length (cm) 3.5 cm 1/18/2019  9:01 AM   Wound Width (cm) 4.5 cm 1/18/2019  9:01 AM   Wound Depth (cm) 1.5 cm 1/18/2019  9:01 AM   Drainage Characteristics/Odor serosanguineous;bleeding controlled 1/18/2019  9:01 AM   Drainage Amount small 1/18/2019  9:01 AM   Dressing Care, Wound other (see comments) 1/17/2019  3:50 PM       Wound 01/10/19 1925 Right lower;lateral leg abrasion (Active)   Dressing Appearance dry;intact 1/18/2019  8:05 AM   Base dressing in place, unable to visualize 1/18/2019  8:05 AM   Drainage Amount none 1/18/2019   8:05 AM   Dressing Care, Wound silicone 1/17/2019  3:50 PM       Wound 01/10/19 1925 Left anterior;lower;proximal leg unspecified (Active)   Dressing Appearance intact;dry 1/18/2019  8:05 AM   Closure Adhesive bandage;VINICIUS 1/18/2019  8:05 AM   Drainage Amount none 1/18/2019  8:05 AM       Wound 01/10/19 1925 Left gluteal abrasion (Active)   Dressing Appearance dry;intact 1/18/2019  8:05 AM   Base dressing in place, unable to visualize 1/18/2019  8:05 AM   Periwound pink 1/18/2019  8:05 AM   Periwound Temperature warm 1/18/2019  8:05 AM   Periwound Skin Turgor soft 1/18/2019  8:05 AM   Edges irregular 1/18/2019  8:05 AM   Drainage Amount none 1/18/2019  8:05 AM   Dressing Care, Wound silicone 1/17/2019  3:50 PM       Wound 01/10/19 1925 Right posterior thigh abrasion (Active)   Dressing Appearance dry;intact 1/18/2019  8:05 AM   Base dressing in place, unable to visualize 1/18/2019  8:05 AM   Periwound pink 1/18/2019  8:05 AM   Periwound Temperature warm 1/18/2019  8:05 AM   Periwound Skin Turgor soft 1/18/2019  8:05 AM   Edges irregular 1/18/2019  8:05 AM   Drainage Characteristics/Odor serosanguineous 1/18/2019  8:05 AM   Drainage Amount none 1/18/2019  8:05 AM   Dressing Care, Wound silicone 1/17/2019  3:50 PM       Wound 01/10/19 1925 (Active)   Drainage Amount none 1/18/2019  8:05 AM       Wound 01/10/19 1925 abdomen (Active)   Dressing Appearance open to air 1/18/2019  8:05 AM   Closure Open to air 1/18/2019  8:05 AM   Base scab 1/18/2019  8:05 AM   Drainage Amount none 1/18/2019  8:05 AM       NPWT (Negative Pressure Wound Therapy) 01/18/19 0901 R heel (Active)   Therapy Setting continuous therapy 1/18/2019  9:01 AM   Dressing foam, black 1/18/2019  9:01 AM   Contact Layer other (see comments) 1/18/2019  9:01 AM   Pressure Setting 125 mmHg 1/18/2019  9:01 AM   Sponges Inserted 1 1/18/2019  9:01 AM   Sponges Removed other (see comments) 1/18/2019  9:01 AM     Rehab Goal Summary     Row Name 01/18/19 1010  01/18/19 0925 01/18/19 0923       Bed Mobility Goal 1 (PT)    Progress/Outcomes (Bed Mobility Goal 1, PT)  --  --  goal ongoing  -LM       Transfer Goal 1 (PT)    Progress/Outcome (Transfer Goal 1, PT)  --  --  goal ongoing  -LM       Patient Education Goal (PT)    Progress/Outcome (Patient Education Goal, PT)  --  --  goal ongoing  -LM       Bed Mobility Goal 1 (OT)    Progress/Outcomes (Bed Mobility Goal 1, OT)  --  goal partially met  -SD  --       Strength Goal 1 (OT)    Progress/Outcome (Strength Goal 1, OT)  --  goal met  -SD  --       Balance Goal 1 (OT)    Progress/Outcomes (Balance Goal 1, OT)  --  goal met  -SD  --       Swallow Goals (SLP)    Oral Nutrition/Hydration Goal Selection (SLP)  oral nutrition/hydration, SLP goal 1  -TM  --  --    Labial Strengthening Goal Selection (SLP)  labial strengthening, SLP goal 1  -TM  --  --    Lingual Strengthening Goal Selection (SLP)  lingual strengthening, SLP goal 1  -TM  --  --       Oral Nutrition/Hydration Goal 1 (SLP)    Oral Nutrition/Hydration Goal 1, SLP  tolerating pureed diet with nectar-thick liq  -TM  --  --    Time Frame (Oral Nutrition/Hydration Goal 1, SLP)  other (see comments) pending MBS results, plan to upgrade diet if able   -TM  --  --    Barriers (Oral Nutrition/Hydration Goal 1, SLP)  medical complexity  -TM  --  --    Progress/Outcomes (Oral Nutrition/Hydration Goal 1, SLP)  continuing progress toward goal  -TM  --  --       Labial Strengthening Goal 1 (SLP)    Activity (Labial Strengthening Goal 1, SLP)  increase labial tone  -TM  --  --    Increase Labial Tone  labial resistance exercises;swallow trials  -TM  --  --    [place]/Accuracy (Labial Strengthening Goal 1, SLP)  independently (over 90% accuracy) 70%   -TM  --  --    Time Frame (Labial Strengthening Goal 1, SLP)  by discharge  -TM  --  --    Barriers (Labial Strengthening Goal 1, SLP)  medical complexity  -TM  --  --    Progress/Outcomes (Labial Strengthening Goal 1, SLP)   goal ongoing;continuing progress toward goal demonstrated 70% with labial ex. including resistance  -TM  --  --       Lingual Strengthening Goal 1 (SLP)    Activity (Lingual Strengthening Goal 1, SLP)  increase lingual tone/sensation/control/coordination/movement;increase tongue back strength  -TM  --  --    Increase Lingual Tone/Sensation/Control/Coordination/Movement  lingual movement exercises;swallow trials;lingual resistance exercises  -TM  --  --    Increase Tongue Back Strength  lingual movement exercises;swallow trials;lingual resistance exercises  -TM  --  --    [place]/Accuracy (Lingual Strengthening Goal 1, SLP)  with minimal cues (75-90% accuracy)  -TM  --  --    Time Frame (Lingual Strengthening Goal 1, SLP)  by discharge  -TM  --  --    Barriers (Lingual Strengthening Goal 1, SLP)  medical complexity  -TM  --  --    Progress/Outcomes (Lingual Strengthening Goal 1, SLP)  goal ongoing;continuing progress toward goal 75% lingual lateralization & strength ex.  -TM  --  --    Row Name 01/18/19 0901             Wound Care Goal 1 (PT)    Wound Care Goal 1 (PT)  Decrease wound dimensions by .5 cm in each dimension.  -LM      Time Frame (Wound Care Goal 1, PT)  2 weeks  -LM      Progress/Outcome (Wound Care Goal 1, PT)  goal ongoing  -LM        User Key  (r) = Recorded By, (t) = Taken By, (c) = Cosigned By    Initials Name Provider Type Discipline    Manuela Acevedo, PT Physical Therapist PT    TM Bhavani Wood Speech and Language Pathologist SLP    Hannah Williamson, OT Occupational Therapist OT        Occupational Therapy Education     Title: PT OT SLP Therapies (In Progress)     Topic: Occupational Therapy (In Progress)     Point: ADL training (Done)     Description: Instruct learner(s) on proper safety adaptation and remediation techniques during self care or transfers.   Instruct in proper use of assistive devices.    Learning Progress Summary           Patient Acceptance, E,TB, VU by SD at  1/17/2019  3:23 PM    Comment:  Safety and sequencing during grooming tasks to increase independence.    Acceptance, E, NR by EB at 1/17/2019 10:30 AM    Comment:  pt alert and Dr Reyes at bedside and discussed plan of care with patient.  No family at bedside.    Acceptance, E,TB, VU by SD at 1/16/2019  1:20 PM    Comment:  Benefit of OT; OT POC                   Point: Home exercise program (Done)     Description: Instruct learner(s) on appropriate technique for monitoring, assisting and/or progressing therapeutic exercises/activities.    Learning Progress Summary           Patient Acceptance, E,TB, VU by SD at 1/18/2019  2:46 PM    Comment:  Benefit of ther ex to improve strength and endurance    Acceptance, E, NR by EB at 1/17/2019 10:30 AM    Comment:  pt alert and Dr Reyes at bedside and discussed plan of care with patient.  No family at bedside.                   Point: Precautions (In Progress)     Description: Instruct learner(s) on prescribed precautions during self-care and functional transfers.    Learning Progress Summary           Patient Acceptance, E, NR by EB at 1/17/2019 10:30 AM    Comment:  pt alert and Dr Reyes at bedside and discussed plan of care with patient.  No family at bedside.                   Point: Body mechanics (In Progress)     Description: Instruct learner(s) on proper positioning and spine alignment during self-care, functional mobility activities and/or exercises.    Learning Progress Summary           Patient Acceptance, E, NR by EB at 1/17/2019 10:30 AM    Comment:  pt alert and Dr Reyes at bedside and discussed plan of care with patient.  No family at bedside.                               User Key     Initials Effective Dates Name Provider Type Discipline     11/07/16 -  Viv Valente, RN Registered Nurse Nurse    SD 03/07/18 -  Hannah Santos OT Occupational Therapist OT                OT Recommendation and Plan  Outcome Summary/Treatment Plan (OT)  Daily Summary of Progress  (OT): progress towards functional goals is fair  Anticipated Discharge Disposition (OT): inpatient rehabilitation facility  Planned Therapy Interventions (OT Eval): activity tolerance training, adaptive equipment training, BADL retraining, patient/caregiver education/training, strengthening exercise, transfer/mobility retraining  Therapy Frequency (OT Eval): daily(Monday-Friday)  Daily Summary of Progress (OT): progress towards functional goals is fair  Plan of Care Review  Plan of Care Reviewed With: patient  Plan of Care Reviewed With: patient  Outcome Summary: OT tx completed. Patient required max A for BM, sat EOB x 15 mins, completed grooming tasks and UB AAROM. Continue OT POC  Outcome Measures     Row Name 01/18/19 0925 01/18/19 0923 01/17/19 1340       How much help from another person do you currently need...    Turning from your back to your side while in flat bed without using bedrails?  --  2  -LM  --    Moving from lying on back to sitting on the side of a flat bed without bedrails?  --  2  -LM  --    Moving to and from a bed to a chair (including a wheelchair)?  --  1  -LM  --    Standing up from a chair using your arms (e.g., wheelchair, bedside chair)?  --  1  -LM  --    Climbing 3-5 steps with a railing?  --  1  -LM  --    To walk in hospital room?  --  1  -LM  --    AM-PAC 6 Clicks Score  --  8  -LM  --       How much help from another is currently needed...    Putting on and taking off regular lower body clothing?  1  -SD  --  1  -SD    Bathing (including washing, rinsing, and drying)  1  -SD  --  1  -SD    Toileting (which includes using toilet bed pan or urinal)  1  -SD  --  1  -SD    Putting on and taking off regular upper body clothing  2  -SD  --  2  -SD    Taking care of personal grooming (such as brushing teeth)  3  -SD  --  2  -SD    Eating meals  3  -SD  --  1  -SD    Score  11  -SD  --  8  -SD       Functional Assessment    Outcome Measure Options  AM-PAC 6 Clicks Daily Activity (OT)   -SD  --  AM-PAC 6 Clicks Daily Activity (OT)  -SD    Row Name 01/17/19 1336 01/16/19 1111 01/16/19 1109       How much help from another person do you currently need...    Turning from your back to your side while in flat bed without using bedrails?  1  -LM  --  1  -LM    Moving from lying on back to sitting on the side of a flat bed without bedrails?  1  -LM  --  1  -LM    Moving to and from a bed to a chair (including a wheelchair)?  1  -LM  --  1  -LM    Standing up from a chair using your arms (e.g., wheelchair, bedside chair)?  1  -LM  --  1  -LM    Climbing 3-5 steps with a railing?  1  -LM  --  1  -LM    To walk in hospital room?  1  -LM  --  1  -LM    AM-PAC 6 Clicks Score  6  -LM  --  6  -LM       How much help from another is currently needed...    Putting on and taking off regular lower body clothing?  --  1  -SD  --    Bathing (including washing, rinsing, and drying)  --  1  -SD  --    Toileting (which includes using toilet bed pan or urinal)  --  1  -SD  --    Putting on and taking off regular upper body clothing  --  1  -SD  --    Taking care of personal grooming (such as brushing teeth)  --  1  -SD  --    Eating meals  --  1  -SD  --    Score  --  6  -SD  --       Functional Assessment    Outcome Measure Options  AM-PAC 6 Clicks Basic Mobility (PT)  -LM  AM-PAC 6 Clicks Daily Activity (OT)  -SD  AM-PAC 6 Clicks Basic Mobility (PT)  -LM      User Key  (r) = Recorded By, (t) = Taken By, (c) = Cosigned By    Initials Name Provider Type    LM Manuela Gomez, PT Physical Therapist    Hannah Williamson OT Occupational Therapist           Time Calculation:   Time Calculation- OT     Row Name 01/18/19 1447             Time Calculation- OT    OT Start Time  0925  -SD      Total Timed Code Minutes- OT  15 minute(s)  -SD      OT Received On  01/18/19  -SD      OT Goal Re-Cert Due Date  01/26/19  -SD         Timed Charges    03221 - OT Therapeutic Exercise Minutes  10  -SD      04442 - OT Therapeutic Activity  Minutes  5  -SD        User Key  (r) = Recorded By, (t) = Taken By, (c) = Cosigned By    Initials Name Provider Type    SD Hannah Santos OT Occupational Therapist           Therapy Suggested Charges     Code   Minutes Charges    68569 (CPT®) Hc Ot Neuromusc Re Education Ea 15 Min      68952 (CPT®) Hc Ot Ther Proc Ea 15 Min 10 1    19817 (CPT®) Hc Ot Therapeutic Act Ea 15 Min 5     45684 (CPT®) Hc Ot Manual Therapy Ea 15 Min      01526 (CPT®) Hc Ot Iontophoresis Ea 15 Min      42543 (CPT®) Hc Ot Elec Stim Ea-Per 15 Min      18495 (CPT®) Hc Ot Ultrasound Ea 15 Min      16424 (CPT®) Hc Ot Self Care/Mgmt/Train Ea 15 Min      Total  15 1        Therapy Charges for Today     Code Description Service Date Service Provider Modifiers Qty    76165957434 HC OT THERAPEUTIC ACT EA 15 MIN 1/17/2019 Hannah Santos OT GO 1    [id number] HC OT THER PROC EA 15 MIN 1/18/2019 Hannah Santos OT GO 1               Hannah Santos OT  1/18/2019

## 2019-01-18 NOTE — THERAPY TREATMENT NOTE
Acute Care - Physical Therapy Treatment Note   [facility]     Patient Name: Millicent Mckeon  : 1960  MRN: [id number]  Today's Date: 2019  Onset of Illness/Injury or Date of Surgery: 19  Date of Referral to PT: 19  Referring Physician: Mohit    Admit Date: 1/10/2019    Visit Dx:    ICD-10-CM ICD-9-CM   1. Altered mental status, unspecified altered mental status type R41.82 780.97   2. Cellulitis of both lower extremities L03.115 682.6    L03.116    3. Impaired functional mobility, balance, gait, and endurance Z74.09 V49.89   4. Impaired mobility and ADLs Z74.09 799.89   5. Dysphagia, oropharyngeal phase R13.12 787.22     Patient Active Problem List   Diagnosis   • Altered mental status   • Anemia   • Bilateral edema of lower extremity   • Cellulitis of lower extremity   • Acute on chronic renal failure (CMS/HCC)   • GERD (gastroesophageal reflux disease)   • Hyperkalemia   • Essential hypertension   • Hypothermia   • Hypothyroidism   • Type 2 diabetes mellitus with complication (CMS/[facility])   • Vitamin B12 deficiency   • Cellulitis of both lower extremities   • Acute metabolic encephalopathy       Therapy Treatment    Rehabilitation Treatment Summary     Row Name 19 1010 19 0923          Treatment Time/Intention    Discipline  speech language pathologist  -TM  physical therapist  -LM     Document Type  therapy note (daily note)  -TM  therapy note (daily note)  -LM     Subjective Information  no complaints  -TM  no complaints  -LM     Mode of Treatment  speech-language pathology;individual therapy  -TM  physical therapy  -LM     Patient/Family Observations  flat affect; pt. repeatedly stated that she was ready to go home  -TM  Pt received supine in bed.  -LM     Care Plan Review  care plan/treatment goals reviewed  -TM  care plan/treatment goals reviewed;patient/other agree to care plan  -LM     Therapy Frequency (PT Clinical Impression)  --  daily  -LM     Total Evaluation Minutes,  SLP  13  -TM  --     Therapy Frequency (Swallow)  PRN  -TM  --     Patient Effort  adequate  -TM  good  -LM     Existing Precautions/Restrictions  -- swallowing precautions  -TM  non-weight bearing;right  -LM     Treatment Considerations/Comments  weakness  -TM  --     Patient Response to Treatment  tolerated well & participated effectively with encouragement  -TM  Increased alertness and participation today.  -LM     Recorded by [TM] Bhavani Wood 01/18/19 1121 [LM] Manuela Gomez, PT 01/18/19 1240     Row Name 01/18/19 1010             Vital Signs    Pre Patient Position  Sitting  -TM      Intra Patient Position  Sitting  -TM      Post Patient Position  Sitting  -TM      Recorded by [TM] Bhavani Wood 01/18/19 1121      Row Name 01/18/19 1010             Swallow Assessment/Intervention    Additional Documentation  General Eating/Swallowing Observations;Oral Motor Structure and Function  -TM      Recorded by [TM] Bhavani Wood 01/18/19 1121      Row Name 01/18/19 1010             Oral Motor and Function    Dentition Assessment  edentulous, does not have dentures  -TM      Secretion Management  WNL/WFL  -TM      Mucosal Quality  dry  -TM      Volitional Cough  WFL  -TM      Recorded by [TM] Bhavani Wood 01/18/19 1121      Row Name 01/18/19 1010             Oral Musculature and Cranial Nerve Assessment    Oral Motor General Assessment  oral labial or buccal impairment;lingual impairment  -TM      Oral Labial or Buccal Impairment, Detail, Cranial Nerve VII (Facial):  CN7: Motor Impairment;reduced ROM;reduced strength bilaterally;left labial droop  -TM      Lingual Impairment, Detail. Cranial Nerves IX, XII (Glossopharyngeal and Hypoglossal)  CN12: Motor Impairment;reduced lingual ROM;reduced strength;reduced strength left  -TM      Recorded by [TM] Bhavani Wood 01/18/19 1121      Row Name 01/18/19 1010             General Eating/Swallowing Observations    Eating/Swallowing Skills  fed by SLP  -TM       Positioning During Eating  upright 90 degree;upright in bed  -TM      Utensils Used  spoon;cup  -TM      Consistencies Trialed  pureed;nectar/syrup-thick liquids  -TM      Recorded by [TM] Nina Bhavani 01/18/19 1121      Row Name 01/18/19 1010             Oral Prep Concerns    Oral Prep Concerns  incomplete or weak lip closure around spoon;spits out food prior to swallow;increased prep time  -TM      Incomplete or Weak Lip Closure Around Spoon  all consistencies  -TM      Increased Prep Time  other (see comments) all trialed (puree with nectar-thick liq)  -TM      Recorded by [TM] Nina Bhavani 01/18/19 1121      Row Name 01/18/19 1010             Oral Transit Concerns    Oral Transit Concerns  increased oral transit time  -TM      Increased Oral Transit Time  all consistencies;other (see comments) all trialed (puree with nectar)  -TM      Recorded by [TM] Nina Bhavani 01/18/19 1121      Row Name 01/18/19 1010             Pharyngeal Phase Concerns    Pharyngeal Phase Concerns  other (see comments) aspiration with thin is concern;no s/s asp. w/puree & nectar  -TM      Recorded by [TM] Nina Bhavani 01/18/19 1121      Row Name 01/18/19 1010             Clinical Impression    SLP Swallowing Diagnosis  moderate;oral dysfunction;pharyngeal dysfunction  -TM      Functional Impact  risk of aspiration/pneumonia  -TM      Rehab Potential/Prognosis, Swallowing  adequate, monitor progress closely  -TM      Swallow Criteria for Skilled Therapeutic Interventions Met  demonstrates skilled criteria  -TM      Recorded by [TM] Nina Bhavani 01/18/19 1121      Row Name 01/18/19 1010             Recommendations    Predicted Duration Therapy Intervention (Days)  until discharge  -TM      SLP Diet Recommendation  puree;nectar thick liquids  -TM      Recommended Diagnostics  VFSS (MBS)  -TM      Recommended Precautions and Strategies  upright posture during/after eating;small bites of food and sips of liquid  -TM       SLP Rec. for Method of Medication Administration  meds crushed;with pudding or applesauce  -TM      Monitor for Signs of Aspiration  yes;notify SLP if any concerns;cough  -TM      Recorded by [TM] Bhavani Wood 01/18/19 1121      Row Name 01/18/19 0923             Safety Issues, Functional Mobility    Safety Issues Affecting Function (Mobility)  safety precautions follow-through/compliance  -LM      Recorded by [LM] Manuela Gomez, PT 01/18/19 1240      Row Name 01/18/19 0923             Mobility Assessment/Intervention    Extremity Weight-bearing Status  right lower extremity  -LM      Right Lower Extremity (Weight-bearing Status)  non weight-bearing (NWB)  -LM      Recorded by [LM] Manuela Gomez, PT 01/18/19 1240      Row Name 01/18/19 0923             Bed Mobility Assessment/Treatment    Bed Mobility Assessment/Treatment  supine-sit;sit-supine  -LM      Supine-Sit [place] (Bed Mobility)  maximum assist (25% patient effort);2 person assist  -LM      Sit-Supine [place] (Bed Mobility)  maximum assist (25% patient effort);2 person assist  -LM      Bed Mobility, Safety Issues  decreased use of arms for pushing/pulling;decreased use of legs for bridging/pushing;impaired trunk control for bed mobility  -LM      Assistive Device (Bed Mobility)  bed rails;draw sheet;head of bed elevated  -LM      Comment (Bed Mobility)  Pt tolerated sitting EOB x 15 minutes.  -LM      Recorded by [LM] Manuela Gomez, PT 01/18/19 1240      Row Name 01/18/19 0923             Transfer Assessment/Treatment    Comment (Transfers)  Unable to assess.  -LM      Recorded by [LM] Manuela Gomez, PT 01/18/19 1240      Row Name 01/18/19 0923             Gait/Stairs Assessment/Training    Comment (Gait/Stairs)  Unable to assess.  -LM      Recorded by [LM] Manuela Gomez, PT 01/18/19 1240      Row Name 01/18/19 0923             Positioning and Restraints    Pre-Treatment Position  in bed  -LM      Post Treatment Position  bed  -LM      In Bed   fowlers;call light within reach;encouraged to call for assist  -LM      Recorded by [LM] Manuela Gomez, PT 01/18/19 1240      Row Name 01/18/19 0923             Pain Scale: Numbers Pre/Post-Treatment    Pain Scale: Numbers, Pretreatment  0/10 - no pain  -LM      Pain Scale: Numbers, Post-Treatment  0/10 - no pain  -LM      Recorded by [LM] Manuela Gomez, PT 01/18/19 1240      Row Name                Wound 01/10/19 1925 Right foot    Wound - Properties Group Date first assessed: 01/10/19 [CH] Time first assessed: 1925 [CH] Side: Right [CH] Location: foot [CH] Stage, Pressure Injury: deep tissue injury;unstageable [CH] Additional Comments: right heel. [CH2] Recorded by:  [CH] Jacqueline Munroe RN 01/11/19 0215 [CH2] Jacqueline Munroe RN 01/11/19 0225    Row Name                Wound 01/10/19 1925 Right lower;lateral leg abrasion    Wound - Properties Group Date first assessed: 01/10/19 [CH] Time first assessed: 1925 [CH] Side: Right [CH] Orientation: lower;lateral [CH] Location: leg [CH] Type: abrasion [CH] Stage, Pressure Injury: Stage 1 [CH] Recorded by:  [CH] Jacqueline Munroe RN 01/11/19 0217    Row Name                Wound 01/10/19 1925 Left anterior;lower;proximal leg unspecified    Wound - Properties Group Date first assessed: 01/10/19 [CH] Time first assessed: 1925 [CH] Side: Left [CH] Orientation: anterior;lower;proximal [CH] Location: leg [CH] Type: unspecified [CH] Recorded by:  [CH] Jacqueline Munroe RN 01/11/19 0221    Row Name                Wound 01/10/19 1925 Left gluteal abrasion    Wound - Properties Group Date first assessed: 01/10/19 [CH] Time first assessed: 1925 [CH] Present On Admission : yes;picture taken [CH] Side: Left [CH] Location: gluteal [CH] Type: abrasion [CH] Recorded by:  [CH] Jacqueline Munroe RN 01/11/19 0224    Row Name                Wound 01/10/19 1925 Right posterior thigh abrasion    Wound - Properties Group Date first assessed: 01/10/19 [CH] Time first assessed: 1925 [CH] Present On  Admission : yes;picture taken [CH] Side: Right [CH] Orientation: posterior [CH] Location: thigh [CH] Type: abrasion [CH] Recorded by:  [CH] Jacqueline Munroe RN 01/11/19 0230    Row Name                Wound 01/10/19 1925    Wound - Properties Group Date first assessed: 01/10/19 [CH] Time first assessed: 1925 [CH] Present On Admission : yes;picture taken [CH] Recorded by:  [CH] Jacqueline Munroe RN 01/11/19 0302    Row Name                Wound 01/10/19 1925 abdomen    Wound - Properties Group Date first assessed: 01/10/19 [CH] Time first assessed: 1925 [CH] Present On Admission : yes;picture taken [CH] Location: abdomen [CH] Recorded by:  [CH] Jacqueline Munroe RN 01/11/19 0323    Row Name                NPWT (Negative Pressure Wound Therapy) 01/18/19 0901 R heel    NPWT (Negative Pressure Wound Therapy) - Properties Group Placement Date: 01/18/19 [LM] Placement Time: 0901 [LM] Location: R heel [LM] Recorded by:  [LM] Manuela Gomez, PT 01/18/19 1224    Row Name 01/18/19 0923             Outcome Summary/Treatment Plan (PT)    Daily Summary of Progress (PT)  progress toward functional goals is gradual  -LM      Plan for Continued Treatment (PT)  Cont PT per POC.  -LM      Anticipated Discharge Disposition (PT)  inpatient rehabilitation facility  -LM      Recorded by [LM] Manuela Gomez, PT 01/18/19 1240      Row Name 01/18/19 1010             Outcome Summary/Treatment Plan (SLP)    Daily Summary of Progress (SLP)  progress toward functional goals is gradual  -TM      Barriers to Overall Progress (SLP)  medical complexity  -TM      Plan for Continued Treatment (SLP)  pending MBS results  -TM      Recorded by [TM] Bhavani Wood 01/18/19 1121        User Key  (r) = Recorded By, (t) = Taken By, (c) = Cosigned By    Initials Name Effective Dates Discipline    CH Jacqueline Munroe RN 11/07/16 -  Nurse    LM Manuela Gomez, PT 04/03/18 -  PT    TM Bhavani Wood 03/07/18 -  SLP          Wound 01/10/19 1925 Right foot (Active)    Dressing Appearance dressing loose;other (see comments) 1/18/2019  9:01 AM   Closure Adhesive bandage 1/18/2019  8:05 AM   Base maroon/purple;black eschar 1/18/2019  9:01 AM   Black (%), Wound Tissue Color 20 1/18/2019  9:01 AM   Red (%), Wound Tissue Color 80 1/18/2019  9:01 AM   Wound Length (cm) 3.5 cm 1/18/2019  9:01 AM   Wound Width (cm) 4.5 cm 1/18/2019  9:01 AM   Wound Depth (cm) 1.5 cm 1/18/2019  9:01 AM   Drainage Characteristics/Odor serosanguineous;bleeding controlled 1/18/2019  9:01 AM   Drainage Amount small 1/18/2019  9:01 AM   Dressing Care, Wound other (see comments) 1/17/2019  3:50 PM       Wound 01/10/19 1925 Right lower;lateral leg abrasion (Active)   Dressing Appearance dry;intact 1/18/2019  8:05 AM   Base dressing in place, unable to visualize 1/18/2019  8:05 AM   Drainage Amount none 1/18/2019  8:05 AM   Dressing Care, Wound silicone 1/17/2019  3:50 PM       Wound 01/10/19 1925 Left anterior;lower;proximal leg unspecified (Active)   Dressing Appearance intact;dry 1/18/2019  8:05 AM   Closure Adhesive bandage;VINICIUS 1/18/2019  8:05 AM   Drainage Amount none 1/18/2019  8:05 AM       Wound 01/10/19 1925 Left gluteal abrasion (Active)   Dressing Appearance dry;intact 1/18/2019  8:05 AM   Base dressing in place, unable to visualize 1/18/2019  8:05 AM   Periwound pink 1/18/2019  8:05 AM   Periwound Temperature warm 1/18/2019  8:05 AM   Periwound Skin Turgor soft 1/18/2019  8:05 AM   Edges irregular 1/18/2019  8:05 AM   Drainage Amount none 1/18/2019  8:05 AM   Dressing Care, Wound silicone 1/17/2019  3:50 PM       Wound 01/10/19 1925 Right posterior thigh abrasion (Active)   Dressing Appearance dry;intact 1/18/2019  8:05 AM   Base dressing in place, unable to visualize 1/18/2019  8:05 AM   Periwound pink 1/18/2019  8:05 AM   Periwound Temperature warm 1/18/2019  8:05 AM   Periwound Skin Turgor soft 1/18/2019  8:05 AM   Edges irregular 1/18/2019  8:05 AM   Drainage Characteristics/Odor serosanguineous  1/18/2019  8:05 AM   Drainage Amount none 1/18/2019  8:05 AM   Dressing Care, Wound silicone 1/17/2019  3:50 PM       Wound 01/10/19 1925 (Active)   Drainage Amount none 1/18/2019  8:05 AM       Wound 01/10/19 1925 abdomen (Active)   Dressing Appearance open to air 1/18/2019  8:05 AM   Closure Open to air 1/18/2019  8:05 AM   Base scab 1/18/2019  8:05 AM   Drainage Amount none 1/18/2019  8:05 AM       NPWT (Negative Pressure Wound Therapy) 01/18/19 0901 R heel (Active)   Therapy Setting continuous therapy 1/18/2019  9:01 AM   Dressing foam, black 1/18/2019  9:01 AM   Contact Layer other (see comments) 1/18/2019  9:01 AM   Pressure Setting 125 mmHg 1/18/2019  9:01 AM   Sponges Inserted 1 1/18/2019  9:01 AM   Sponges Removed other (see comments) 1/18/2019  9:01 AM       Rehab Goal Summary     Row Name 01/18/19 1010 01/18/19 0923 01/18/19 0901       Bed Mobility Goal 1 (PT)    Progress/Outcomes (Bed Mobility Goal 1, PT)  --  goal ongoing  -LM  --       Transfer Goal 1 (PT)    Progress/Outcome (Transfer Goal 1, PT)  --  goal ongoing  -LM  --       Wound Care Goal 1 (PT)    Wound Care Goal 1 (PT)  --  --  Decrease wound dimensions by .5 cm in each dimension.  -LM    Time Frame (Wound Care Goal 1, PT)  --  --  2 weeks  -LM    Progress/Outcome (Wound Care Goal 1, PT)  --  --  goal ongoing  -LM       Patient Education Goal (PT)    Progress/Outcome (Patient Education Goal, PT)  --  goal ongoing  -LM  --       Swallow Goals (SLP)    Oral Nutrition/Hydration Goal Selection (SLP)  oral nutrition/hydration, SLP goal 1  -TM  --  --    Labial Strengthening Goal Selection (SLP)  labial strengthening, SLP goal 1  -TM  --  --    Lingual Strengthening Goal Selection (SLP)  lingual strengthening, SLP goal 1  -TM  --  --       Oral Nutrition/Hydration Goal 1 (SLP)    Oral Nutrition/Hydration Goal 1, SLP  tolerating pureed diet with nectar-thick liq  -TM  --  --    Time Frame (Oral Nutrition/Hydration Goal 1, SLP)  other (see comments)  pending MBS results, plan to upgrade diet if able   -TM  --  --    Barriers (Oral Nutrition/Hydration Goal 1, SLP)  medical complexity  -TM  --  --    Progress/Outcomes (Oral Nutrition/Hydration Goal 1, SLP)  continuing progress toward goal  -TM  --  --       Labial Strengthening Goal 1 (SLP)    Activity (Labial Strengthening Goal 1, SLP)  increase labial tone  -TM  --  --    Increase Labial Tone  labial resistance exercises;swallow trials  -TM  --  --    [place]/Accuracy (Labial Strengthening Goal 1, SLP)  independently (over 90% accuracy) 70%   -TM  --  --    Time Frame (Labial Strengthening Goal 1, SLP)  by discharge  -TM  --  --    Barriers (Labial Strengthening Goal 1, SLP)  medical complexity  -TM  --  --    Progress/Outcomes (Labial Strengthening Goal 1, SLP)  goal ongoing;continuing progress toward goal demonstrated 70% with labial ex. including resistance  -TM  --  --       Lingual Strengthening Goal 1 (SLP)    Activity (Lingual Strengthening Goal 1, SLP)  increase lingual tone/sensation/control/coordination/movement;increase tongue back strength  -TM  --  --    Increase Lingual Tone/Sensation/Control/Coordination/Movement  lingual movement exercises;swallow trials;lingual resistance exercises  -TM  --  --    Increase Tongue Back Strength  lingual movement exercises;swallow trials;lingual resistance exercises  -TM  --  --    [place]/Accuracy (Lingual Strengthening Goal 1, SLP)  with minimal cues (75-90% accuracy)  -TM  --  --    Time Frame (Lingual Strengthening Goal 1, SLP)  by discharge  -TM  --  --    Barriers (Lingual Strengthening Goal 1, SLP)  medical complexity  -TM  --  --    Progress/Outcomes (Lingual Strengthening Goal 1, SLP)  goal ongoing;continuing progress toward goal 75% lingual lateralization & strength ex.  -TM  --  --      User Key  (r) = Recorded By, (t) = Taken By, (c) = Cosigned By    Initials Name Provider Type Discipline    Manuela Acevedo, PT Physical Therapist PT    TM  Bhavani Wood Speech and Language Pathologist SLP          Physical Therapy Education     Title: PT OT SLP Therapies (In Progress)     Topic: Physical Therapy (Done)     Point: Mobility training (Done)     Learning Progress Summary           Patient Acceptance, E,TB, VU by LM at 1/18/2019 12:41 PM    Comment:  Importance of consistent activity with PT to improve mobility.    Acceptance, E,TB, VU by LM at 1/17/2019  3:16 PM    Comment:  Ther ex for strengthening.    Acceptance, E, NR by EB at 1/17/2019 10:30 AM    Comment:  pt alert and Dr Reyes at bedside and discussed plan of care with patient.  No family at bedside.    Acceptance, E,TB, VU by LM at 1/16/2019 11:45 AM    Comment:  Purpose of PT/POC.                   Point: Home exercise program (Done)     Learning Progress Summary           Patient Acceptance, E,TB, VU by LM at 1/18/2019 12:41 PM    Comment:  Importance of consistent activity with PT to improve mobility.    Acceptance, E,TB, VU by LM at 1/17/2019  3:16 PM    Comment:  Ther ex for strengthening.    Acceptance, E, NR by EB at 1/17/2019 10:30 AM    Comment:  pt alert and Dr Reyes at bedside and discussed plan of care with patient.  No family at bedside.    Acceptance, E,TB, VU by LM at 1/16/2019 11:45 AM    Comment:  Purpose of PT/POC.                   Point: Precautions (Done)     Learning Progress Summary           Patient Acceptance, E,TB, VU by LM at 1/18/2019 12:41 PM    Comment:  Importance of consistent activity with PT to improve mobility.    Acceptance, E,TB, VU by LM at 1/17/2019  3:16 PM    Comment:  Ther ex for strengthening.    Acceptance, E, NR by EB at 1/17/2019 10:30 AM    Comment:  pt alert and Dr Reyes at bedside and discussed plan of care with patient.  No family at bedside.    Acceptance, E,TB, VU by LM at 1/16/2019 11:45 AM    Comment:  Purpose of PT/POC.                               User Key     Initials Effective Dates Name Provider Type Discipline    EVER 11/07/16 -  Ambrosio  Viv SINGER RN Registered Nurse Nurse     04/03/18 -  Manuela Gomez, PT Physical Therapist PT                PT Recommendation and Plan  Anticipated Discharge Disposition (PT): inpatient rehabilitation facility  Planned Therapy Interventions (PT Eval): wound care  Therapy Frequency (PT Clinical Impression): daily  Outcome Summary/Treatment Plan (PT)  Daily Summary of Progress (PT): progress toward functional goals is gradual  Plan for Continued Treatment (PT): Cont PT per POC.  Anticipated Discharge Disposition (PT): inpatient rehabilitation facility  Plan of Care Reviewed With: patient  Progress: improving  Outcome Summary: Better participation with PT this date. Able to sit EOB x 15 minutes. Cont PT to goals.  Outcome Measures     Row Name 01/18/19 0923 01/17/19 1340 01/17/19 1336       How much help from another person do you currently need...    Turning from your back to your side while in flat bed without using bedrails?  2  -LM  --  1  -LM    Moving from lying on back to sitting on the side of a flat bed without bedrails?  2  -LM  --  1  -LM    Moving to and from a bed to a chair (including a wheelchair)?  1  -LM  --  1  -LM    Standing up from a chair using your arms (e.g., wheelchair, bedside chair)?  1  -LM  --  1  -LM    Climbing 3-5 steps with a railing?  1  -LM  --  1  -LM    To walk in hospital room?  1  -LM  --  1  -LM    AM-PAC 6 Clicks Score  8  -LM  --  6  -LM       How much help from another is currently needed...    Putting on and taking off regular lower body clothing?  --  1  -SD  --    Bathing (including washing, rinsing, and drying)  --  1  -SD  --    Toileting (which includes using toilet bed pan or urinal)  --  1  -SD  --    Putting on and taking off regular upper body clothing  --  2  -SD  --    Taking care of personal grooming (such as brushing teeth)  --  2  -SD  --    Eating meals  --  1  -SD  --    Score  --  8  -SD  --       Functional Assessment    Outcome Measure Options  --  AM-PAC 6  Clicks Daily Activity (OT)  -SD  -[facility] 6 Clicks Basic Mobility (PT)  -LM    Row Name 01/16/19 1111 01/16/19 1109          How much help from another person do you currently need...    Turning from your back to your side while in flat bed without using bedrails?  --  1  -LM     Moving from lying on back to sitting on the side of a flat bed without bedrails?  --  1  -LM     Moving to and from a bed to a chair (including a wheelchair)?  --  1  -LM     Standing up from a chair using your arms (e.g., wheelchair, bedside chair)?  --  1  -LM     Climbing 3-5 steps with a railing?  --  1  -LM     To walk in hospital room?  --  1  -LM     AM-PAC 6 Clicks Score  --  6  -LM        How much help from another is currently needed...    Putting on and taking off regular lower body clothing?  1  -SD  --     Bathing (including washing, rinsing, and drying)  1  -SD  --     Toileting (which includes using toilet bed pan or urinal)  1  -SD  --     Putting on and taking off regular upper body clothing  1  -SD  --     Taking care of personal grooming (such as brushing teeth)  1  -SD  --     Eating meals  1  -SD  --     Score  6  -SD  --        Functional Assessment    Outcome Measure Options  AM-PAC 6 Clicks Daily Activity (OT)  -SD  AM-[facility] 6 Clicks Basic Mobility (PT)  -LM       User Key  (r) = Recorded By, (t) = Taken By, (c) = Cosigned By    Initials Name Provider Type    Manuela Acevedo, PT Physical Therapist    Hannah Williamson, OT Occupational Therapist         Time Calculation:   PT Charges     Row Name 01/18/19 1244 01/18/19 1234          Time Calculation    Start Time  0923  -LM  0901  -LM     PT Received On  01/18/19  -LM  01/18/19  -     PT Goal Re-Cert Due Date  --  01/28/19  -        Time Calculation- PT    Total Timed Code Minutes- PT  17 minute(s)  -  --       User Key  (r) = Recorded By, (t) = Taken By, (c) = Cosigned By    Initials Name Provider Type    Manuela Acevedo, PT Physical Therapist        Therapy  Suggested Charges     Code   Minutes Charges    None           Therapy Charges for Today     Code Description Service Date Service Provider Modifiers Qty    55744157283 HC PT THERAPEUTIC ACT EA 15 MIN 1/17/2019 Manuela Gomez, PT GP 1    [id number]  PT NEG PRESS WOUND TO 50SQCM DME1 1/18/2019 Manuela Gomez, PT  1    93745877674 HC PT THERAPEUTIC ACT EA 15 MIN 1/18/2019 Manuela Gomez, PT GP 1          PT G-Codes  Outcome Measure Options: AM-PAC 6 Clicks Daily Activity (OT)  AM-PAC 6 Clicks Score: 8  Score: 8    Manuela Gomez, PT  1/18/2019

## 2019-01-19 LAB
ANION GAP SERPL CALCULATED.3IONS-SCNC: 11.3 MMOL/L (ref 10–20)
BUN BLD-MCNC: 66 MG/DL (ref 7–20)
BUN/CREAT SERPL: 36.7 (ref 7.1–23.5)
CALCIUM SPEC-SCNC: 8.8 MG/DL (ref 8.4–10.2)
CHLORIDE SERPL-SCNC: 114 MMOL/L (ref 98–107)
CO2 SERPL-SCNC: 25 MMOL/L (ref 26–30)
CREAT BLD-MCNC: 1.8 MG/DL (ref 0.6–1.3)
DEPRECATED RDW RBC AUTO: 67.2 FL (ref 37–54)
ERYTHROCYTE [DISTWIDTH] IN BLOOD BY AUTOMATED COUNT: 21.8 % (ref 11.5–14.5)
GFR SERPL CREATININE-BSD FRML MDRD: 29 ML/MIN/1.73
GFR SERPL CREATININE-BSD FRML MDRD: 35 ML/MIN/1.73
GLUCOSE BLD-MCNC: 128 MG/DL (ref 74–98)
GLUCOSE BLDC GLUCOMTR-MCNC: 121 MG/DL (ref 70–130)
GLUCOSE BLDC GLUCOMTR-MCNC: 138 MG/DL (ref 70–130)
GLUCOSE BLDC GLUCOMTR-MCNC: 168 MG/DL (ref 70–130)
GLUCOSE BLDC GLUCOMTR-MCNC: 186 MG/DL (ref 70–130)
HCT VFR BLD AUTO: 30.1 % (ref 37–47)
HGB BLD-MCNC: 9 G/DL (ref 12–16)
MCH RBC QN AUTO: 25.8 PG (ref 27–31)
MCHC RBC AUTO-ENTMCNC: 29.9 G/DL (ref 30–37)
MCV RBC AUTO: 86.2 FL (ref 81–99)
PLATELET # BLD AUTO: 209 10*3/MM3 (ref 130–400)
PMV BLD AUTO: 10.5 FL (ref 6–12)
POTASSIUM BLD-SCNC: 3.3 MMOL/L (ref 3.5–5.1)
RBC # BLD AUTO: 3.49 10*6/MM3 (ref 4.2–5.4)
SODIUM BLD-SCNC: 147 MMOL/L (ref 137–145)
WBC NRBC COR # BLD: 7.84 10*3/MM3 (ref 4.8–10.8)

## 2019-01-19 PROCEDURE — 63710000001 INSULIN ASPART PER 5 UNITS: Performed by: INTERNAL MEDICINE

## 2019-01-19 PROCEDURE — 82962 GLUCOSE BLOOD TEST: CPT

## 2019-01-19 PROCEDURE — 25010000002 HYDROCORTISONE SODIUM SUCCINATE 100 MG RECONSTITUTED SOLUTION: Performed by: HOSPITALIST

## 2019-01-19 PROCEDURE — 99232 SBSQ HOSP IP/OBS MODERATE 35: CPT | Performed by: INTERNAL MEDICINE

## 2019-01-19 PROCEDURE — 25010000002 HYDRALAZINE PER 20 MG: Performed by: HOSPITALIST

## 2019-01-19 PROCEDURE — 25010000002 ERTAPENEM PER 500 MG: Performed by: INTERNAL MEDICINE

## 2019-01-19 PROCEDURE — 80048 BASIC METABOLIC PNL TOTAL CA: CPT | Performed by: INTERNAL MEDICINE

## 2019-01-19 PROCEDURE — 85027 COMPLETE CBC AUTOMATED: CPT | Performed by: INTERNAL MEDICINE

## 2019-01-19 PROCEDURE — 25010000002 PROMETHAZINE PER 50 MG: Performed by: INTERNAL MEDICINE

## 2019-01-19 PROCEDURE — 25010000002 HEPARIN (PORCINE) PER 1000 UNITS: Performed by: INTERNAL MEDICINE

## 2019-01-19 PROCEDURE — 25010000002 ONDANSETRON PER 1 MG: Performed by: INTERNAL MEDICINE

## 2019-01-19 RX ORDER — POTASSIUM CHLORIDE 20 MEQ/1
40 TABLET, EXTENDED RELEASE ORAL ONCE
Status: COMPLETED | OUTPATIENT
Start: 2019-01-19 | End: 2019-01-19

## 2019-01-19 RX ORDER — SODIUM CHLORIDE 9 MG/ML
INJECTION, SOLUTION INTRAVENOUS
Status: COMPLETED
Start: 2019-01-19 | End: 2019-01-19

## 2019-01-19 RX ORDER — SPIRONOLACTONE 25 MG/1
25 TABLET ORAL DAILY
Status: DISCONTINUED | OUTPATIENT
Start: 2019-01-19 | End: 2019-01-22 | Stop reason: HOSPADM

## 2019-01-19 RX ADMIN — HYDROCORTISONE SODIUM SUCCINATE 50 MG: 100 INJECTION, POWDER, FOR SOLUTION INTRAMUSCULAR; INTRAVENOUS at 00:48

## 2019-01-19 RX ADMIN — Medication 1 CAPSULE: at 09:00

## 2019-01-19 RX ADMIN — INSULIN ASPART 2 UNITS: 100 INJECTION, SOLUTION INTRAVENOUS; SUBCUTANEOUS at 11:34

## 2019-01-19 RX ADMIN — HYDRALAZINE HYDROCHLORIDE 25 MG: 25 TABLET ORAL at 22:32

## 2019-01-19 RX ADMIN — LEVOTHYROXINE SODIUM 100 MCG: 100 TABLET ORAL at 06:28

## 2019-01-19 RX ADMIN — POTASSIUM CHLORIDE 40 MEQ: 1500 TABLET, EXTENDED RELEASE ORAL at 09:02

## 2019-01-19 RX ADMIN — PROMETHAZINE HYDROCHLORIDE 12.5 MG: 25 INJECTION INTRAMUSCULAR; INTRAVENOUS at 20:41

## 2019-01-19 RX ADMIN — SPIRONOLACTONE 25 MG: 25 TABLET, FILM COATED ORAL at 11:34

## 2019-01-19 RX ADMIN — ONDANSETRON 4 MG: 2 INJECTION INTRAMUSCULAR; INTRAVENOUS at 16:59

## 2019-01-19 RX ADMIN — SODIUM CHLORIDE: 900 INJECTION INTRAVENOUS at 21:27

## 2019-01-19 RX ADMIN — FAMOTIDINE 20 MG: 10 INJECTION, SOLUTION INTRAVENOUS at 09:00

## 2019-01-19 RX ADMIN — ALUMINUM HYDROXIDE, MAGNESIUM HYDROXIDE, AND DIMETHICONE 15 ML: 400; 400; 40 SUSPENSION ORAL at 04:58

## 2019-01-19 RX ADMIN — HEPARIN SODIUM 5000 UNITS: 5000 INJECTION, SOLUTION INTRAVENOUS; SUBCUTANEOUS at 09:00

## 2019-01-19 RX ADMIN — HEPARIN SODIUM 5000 UNITS: 5000 INJECTION, SOLUTION INTRAVENOUS; SUBCUTANEOUS at 20:41

## 2019-01-19 RX ADMIN — SODIUM CHLORIDE 1 G: 9 INJECTION, SOLUTION INTRAVENOUS at 13:43

## 2019-01-19 RX ADMIN — HYDRALAZINE HYDROCHLORIDE 25 MG: 25 TABLET ORAL at 06:27

## 2019-01-19 RX ADMIN — HYDROCORTISONE SODIUM SUCCINATE 50 MG: 100 INJECTION, POWDER, FOR SOLUTION INTRAMUSCULAR; INTRAVENOUS at 13:43

## 2019-01-19 RX ADMIN — SODIUM CHLORIDE, PRESERVATIVE FREE 10 ML: 5 INJECTION INTRAVENOUS at 00:48

## 2019-01-19 RX ADMIN — SODIUM CHLORIDE, PRESERVATIVE FREE 3 ML: 5 INJECTION INTRAVENOUS at 09:00

## 2019-01-19 RX ADMIN — HYDRALAZINE HYDROCHLORIDE 10 MG: 20 INJECTION INTRAMUSCULAR; INTRAVENOUS at 11:37

## 2019-01-19 NOTE — PROGRESS NOTES
[facility]    Name:  Millicent Mckeon   Age:  58 y.o.  Sex:  female  :  1960  MRN:  [id number]   Visit Number:  [id number]  Admission Date:  1/10/2019  Date Of Service:  19  Primary Care Physician:  Marianela Albright APRN     LOS: 9 days :  Patient Care Team:  Marianela Albright APRN as PCP - General (Family Medicine)  Geremias Shepherd MD as Consulting Physician (General Surgery):    Chief Complaint:      Generalized weakness and heartburn.    Subjective / Interval History:     Ms. Mckeon is currently sitting up on the bed and is answering questions appropriately.  Is still having some heartburn. She was transferred out of ICU on 2019.  She was seen by speech therapy and they have recommended puréed diet with nectar thick liquids.  She has been working with physical therapy doing better exercises.  She has not had any dialysis since last 2 days.  Her Wilkinson catheter was discontinued yesterday.  No significant overnight events.    She was transferred from [facility] emergency room on 1/10/2019 with symptoms of generalized weakness, altered mental status, shortness of breath and leg cellulitis.  She was noted to have hypothermia and hypotension and was placed on warming blanket on presentation.  She was placed on broad-spectrum IV antibiotic therapy with Zosyn and vancomycin and a consultation was obtained from Dr. Rueda from podiatry.  She was also noted to have acute renal failure and has been followed by Dr. Leone.  According to the sister Virginia, patient was in her normal state of health about 2 weeks ago since when she started having generalized weakness and worsening leg swelling.  She has morbid obesity and apparently uses a wheelchair.  Patient's recent CT scan of the abdomen and pelvis done at [facility] emergency room noted lymphadenopathy especially in the retroperitoneal and bilateral inguinal regions as well as  paratracheal and retrocrural. Our initial CT scan reports done here did not mention these findings but I discussed with our radiologist on 1/14/2019 and he reviewed the scans done here and he stated that he does see the lymph nodes in the above-mentioned areas.  He also stated that the patient has a tiny nodule on the left lobe of the thyroid but he did not think it was 2 cm in size.  She was seen by Dr. Theodore from oncology on 1/16/2019.  He recommended outpatient follow-up for her lymphadenopathy and possible outpatient lymph node biopsy.     Patient did undergo right foot surgical debridement by Dr. Rueda on 1/14/2019.  Patient was also started on temporary hemodialysis on 1/15/2019.  After foot surgery, patient started improving slowly with regards to her mental status.  Patient was on tube feeds which he has tolerated well.  Her NG tube was discontinued on 1/17/2019 and she has been placed on puréed diet with nectar thick liquids.  She was transferred out of the ICU on 1/17/2019.    Review of Systems:     General ROS: Patient denies any fevers, chills or loss of consciousness.  Complains of generalized weakness.  Respiratory ROS: Complains of cough and chest congestion.  Cardiovascular ROS: Denies chest pain or palpitations. No history of exertional chest pain.  Gastrointestinal ROS: Complains of nausea and heartburn.  Neurological ROS: Denies any focal weakness. No loss of consciousness. Denies any numbness.    Vital Signs:    Temp:  [97.2 °F (36.2 °C)-98.6 °F (37 °C)] 98.6 °F (37 °C)  Heart Rate:  [77-88] 88  Resp:  [16-20] 18  BP: (148-194)/(61-82) 148/61    Intake and output:    I/O last 3 completed shifts:  In: 470 [P.O.:220; I.V.:250]  Out: 1450 [Urine:1450]  No intake/output data recorded.    Physical Examination:    General Appearance:  Alert and oriented to person, not in any acute distress.   Head:  Atraumatic and normocephalic, without obvious abnormality.   Eyes:          PERRLA, conjunctivae and  sclerae normal, no Icterus. No pallor.  Extraocular movements are within normal limits.     Neck: Supple, short neck, trachea midline, no thyromegaly, no carotid bruit.   Lungs:   Chest shape is normal. Breath sounds decreased bilaterally due to thick chest wall.  No wheezing.  Bilateral scattered crackles heard. No Pleural rub or bronchial breathing.  Right subclavian central line is in place.   Heart:  Normal S1 and S2, no murmur, no gallop, no rub. No JVD   Abdomen:   Normal bowel sounds, no masses, no organomegaly. Soft, non-tender, obese with a large pannus with healing ulcers noted on the skin.   Extremities: Large lower extremities due to obesity with multiple skin folds that are indurated and hyperemic.  Superficial skin ulcer noted on the lateral aspect of the right leg without any purulent discharge.  Surgical bandage is noted on the right foot.  Wound noted on the medial aspect of the right thigh with purulent slough.  Excoriations noted on bilateral legs and feet.     Skin: As described above.  Please see nurse's notes and the photographs in the chart for further details.  Lower extremity redness has significantly improved in the last several days.     Neurologic: Alert and oriented to place and person.  Moves all 4 limbs equally but does have significant generalized weakness.       Laboratory results:    Results from last 7 days   Lab Units 01/19/19  0525 01/18/19  0515 01/17/19  0428  01/14/19  0414 01/13/19  0423   SODIUM mmol/L 147* 143 141   < > 138 138   POTASSIUM mmol/L 3.3* 3.4* 3.5   < > 4.2 4.8   CHLORIDE mmol/L 114* 112* 109*   < > 109* 108*   CO2 mmol/L 25.0* 25.0* 22.0*   < > 19.0* 18.0*   BUN mg/dL 66* 65* 61*   < > 82* 73*   CREATININE mg/dL 1.80* 1.90* 2.10*   < > 4.00* 3.80*   CALCIUM mg/dL 8.8 8.5 8.4   < > 7.9* 8.1*   BILIRUBIN mg/dL  --   --   --   --  0.4 0.4   ALK PHOS U/L  --   --   --   --  272* 323*   ALT (SGPT) U/L  --   --   --   --  27 34   AST (SGOT) U/L  --   --   --   --   16 21   GLUCOSE mg/dL 128* 156* 208*   < > 181* 202*    < > = values in this interval not displayed.     Results from last 7 days   Lab Units 01/19/19  0525 01/18/19  0515 01/17/19  0428   WBC 10*3/mm3 7.84 7.61 11.00*   HEMOGLOBIN g/dL 9.0* 8.4* 8.2*   HEMATOCRIT % 30.1* 27.6* 26.8*   PLATELETS 10*3/mm3 209 139 108*     Results from last 7 days   Lab Units 01/13/19  0901   INR  1.41*         Results from last 7 days   Lab Units 01/14/19  1354   WOUNDCX  Light growth (2+) Proteus vulgaris*  Klebsiella pneumoniae ESBL*     I have reviewed the patient's laboratory results.    Radiology results:    Imaging Results (last 24 hours)     ** No results found for the last 24 hours. **        Medication Review:     I have reviewed the patients active and prn medications.       Cellulitis of both lower extremities    Anemia    Bilateral edema of lower extremity    Cellulitis of lower extremity    Acute on chronic renal failure (CMS/[facility])    GERD (gastroesophageal reflux disease)    Hyperkalemia    Essential hypertension    Hypothermia    Hypothyroidism    Type 2 diabetes mellitus with complication (CMS/[facility])    Vitamin B12 deficiency    Acute metabolic encephalopathy    Assessment:    1.  Sepsis with hypothermia, present on admission, improving.  2.  Bilateral lower extremity cellulitis with ESBL Klebsiella, present on admission, improving.  3.  Acute metabolic encephalopathy, present on admission, improving.  4.  Acute renal failure, present on admission.  5.  Hyperkalemia, present on admission, improved.  6.  Acquired hypothyroidism, uncontrolled.  7.  Diabetes mellitus type 2 with nephropathy.  8.  Morbid obesity with a BMI of 63.  9.  Chronic venous insufficiency of the lower extremities.  10. Retrocrural, paratracheal, retro-peritoneal, bilateral inguinal lymphadenopathy, uncertain etiology.  11.  Left thyroid nodule 2 cm on recent CT scan.  12.  Thrombocytopenia likely secondary to sepsis, improving.    Plan:    Ms.  Mike continues to improve but still has significant generalized weakness.  Her sepsis has improved.  She is currently on Invanz for her right ulcer and infection.  Wound culture is growing ESBL Klebsiella.  Her nasal swab for MRSA has been negative.  Blood cultures have been negative so far. She is being followed by Dr. Leone and her renal function is slowly improving and she may not need further dialysis.      Patient will be continued on physical and occupational therapy.  She will need short-term rehabilitation at the time of discharge.  She is on heparin for DVT prophylaxis.  I have discussed the patient's condition and treatment plan with Dr. Leone.  He has started her on spironolactone.  She will need follow-up with Dr. Theodore for her retroperitoneal and mediastinal lymphadenopathy.        Samson Reyes MD  01/19/19  1:42 PM    Dictated utilizing Dragon dictation.

## 2019-01-19 NOTE — PROGRESS NOTES
"Nephrology Progress Note.    LOS: 9 days    Patient Care Team:  Marianela Albright APRN as PCP - General (Family Medicine)  Geremias Shepherd MD as Consulting Physician (General Surgery)    Chief Complaint:  No chief complaint on file.      Subjective     Follow up for FREDDY and related issues.    Interval History:     Patient Complaints: none    Patient seen and examined this morning.  Events from last night noted.  She is again drowsy and sleepy today will not wake up or have any significant interaction.  She didn't even say she wants to go home today.     Review of Systems:    No meaningful review of system possible.      Objective     Vital Signs  /62 (BP Location: Left arm, Patient Position: Lying)   Pulse 86   Temp 97.2 °F (36.2 °C) (Oral)   Resp 20   Ht 165.1 cm (65\")   Wt (!) 147 kg (324 lb 2 oz)   SpO2 92%   BMI 53.94 kg/m²       No intake/output data recorded.    Intake/Output Summary (Last 24 hours) at 1/19/2019 0945  Last data filed at 1/18/2019 1725  Gross per 24 hour   Intake --   Output 900 ml   Net -900 ml       Physical Exam:    General Appearance:  no acute distress,   HEENT: Oral mucosa dry, extra occular movements intact. Sclera clear.  Skin: Warm and dry  Neck: supple, no JVD, trachea midline  Lungs:Chest shape is normal. Breath sounds heard bilaterally equally. No crackles, No wheezing.   Heart: regular rate and rhythm. normal S1 and S2, no S3, no rub, peripheral pulses weak but palpable.  Abdomen: Obese, soft, non-tender,  present bowel sounds to auscultation  : no palpable bladder.  Extremities: 1-2+ edema, no cyanosis or clubbing.   Neuro: She does have interaction and move her extremities randomly.     Results Review:    Results from last 7 days   Lab Units 01/19/19  0525 01/18/19  0515 01/17/19  0428  01/14/19  0414 01/13/19  0423   SODIUM mmol/L 147* 143 141   < > 138 138   POTASSIUM mmol/L 3.3* 3.4* 3.5   < > 4.2 4.8   CHLORIDE mmol/L 114* 112* 109*   < > 109* 108*   CO2 mmol/L " 25.0* 25.0* 22.0*   < > 19.0* 18.0*   BUN mg/dL 66* 65* 61*   < > 82* 73*   CREATININE mg/dL 1.80* 1.90* 2.10*   < > 4.00* 3.80*   CALCIUM mg/dL 8.8 8.5 8.4   < > 7.9* 8.1*   BILIRUBIN mg/dL  --   --   --   --  0.4 0.4   ALK PHOS U/L  --   --   --   --  272* 323*   ALT (SGPT) U/L  --   --   --   --  27 34   AST (SGOT) U/L  --   --   --   --  16 21   GLUCOSE mg/dL 128* 156* 208*   < > 181* 202*    < > = values in this interval not displayed.       Estimated Creatinine Clearance: 48.8 mL/min (A) (by C-G formula based on SCr of 1.8 mg/dL (H)).    Results from last 7 days   Lab Units 01/16/19  0537   MAGNESIUM mg/dL 2.5*             Results from last 7 days   Lab Units 01/19/19  0525 01/18/19  0515 01/17/19  0428 01/16/19  0537 01/15/19  0418   WBC 10*3/mm3 7.84 7.61 11.00* 13.99* 19.82*   HEMOGLOBIN g/dL 9.0* 8.4* 8.2* 8.4* 8.1*   PLATELETS 10*3/mm3 209 139 108* 79* 83*       Results from last 7 days   Lab Units 01/13/19  0901   INR  1.41*         Imaging Results (last 24 hours)     ** No results found for the last 24 hours. **          ertapenem 1 g Intravenous Q24H   famotidine 20 mg Intravenous Daily   heparin (porcine) 5,000 Units Subcutaneous Q12H   hydrALAZINE 25 mg Nasogastric Q8H   hydrocortisone sodium succinate 50 mg Intravenous Q12H   insulin aspart 0-7 Units Subcutaneous 4x Daily AC & at Bedtime   insulin detemir 30 Units Subcutaneous QAM   lactobacillus acidophilus 1 capsule Nasogastric Daily   levothyroxine 100 mcg Nasogastric Q AM   sodium chloride 3 mL Intravenous Q12H       sodium chloride 10 mL/hr Last Rate: 10 mL/hr (01/18/19 6068)       Medication Review:   Current Facility-Administered Medications   Medication Dose Route Frequency Provider Last Rate Last Dose   • acetaminophen (TYLENOL) tablet 650 mg  650 mg Oral Q4H PRN Milad Leone MD, FASN       • aluminum-magnesium hydroxide-simethicone (MAALOX MAX) 400-400-40 MG/5ML suspension 15 mL  15 mL Oral Q6H PRN Samson Reyes MD   15 mL at 01/19/19  0458   • CHAPSTICK 1 each  1 each Apply externally Q1H PRN Samson Reyes MD   1 each at 01/16/19 1155   • dextrose (D50W) 25 g/ 50mL Intravenous Solution 25 g  25 g Intravenous Q15 Min PRN Milad Leone MD, FASN       • dextrose (GLUTOSE) oral gel 1 tube  1 tube Oral Q15 Min PRN Milad Leone MD, FASN       • ertapenem (INVanz) 1 g in sodium chloride 0.9 % 100 mL IVPB-MBP  1 g Intravenous Q24H Milad Leone MD, GILA 200 mL/hr at 01/18/19 1415 1 g at 01/18/19 1415   • famotidine (PEPCID) injection 20 mg  20 mg Intravenous Daily Liz Gregg MD   20 mg at 01/19/19 0900   • glucagon (human recombinant) (GLUCAGEN DIAGNOSTIC) injection 1 mg  1 mg Subcutaneous PRN Milad Leone MD, SHANIN       • glycerin 35 % liquid 35% 2 spray  2 spray Mouth/Throat Q2H PRN Maribel Wheeler DO   2 spray at 01/16/19 0857   • heparin (porcine) 5000 UNIT/ML injection 5,000 Units  5,000 Units Subcutaneous Q12H Samson Reyes MD   5,000 Units at 01/19/19 0900   • heparin (porcine) injection 2,400 Units  2,400 Units Intracatheter PRN Milad Leone MD, FASN   2,400 Units at 01/15/19 1502   • hydrALAZINE (APRESOLINE) injection 10 mg  10 mg Intravenous Q6H PRN Liz Gregg MD   10 mg at 01/18/19 2129   • hydrALAZINE (APRESOLINE) tablet 25 mg  25 mg Nasogastric Q8H Samson Reyes MD   25 mg at 01/19/19 0627   • hydrocortisone sodium succinate (Solu-CORTEF) injection 50 mg  50 mg Intravenous Q12H Liz Gregg MD   50 mg at 01/19/19 0048   • insulin aspart (novoLOG) injection 0-7 Units  0-7 Units Subcutaneous 4x Daily AC & at Bedtime Samson Reyes MD   2 Units at 01/18/19 1209   • insulin detemir (LEVEMIR) injection 30 Units  30 Units Subcutaneous QAM Samson Reyes MD   30 Units at 01/18/19 0652   • lactobacillus acidophilus (RISAQUAD) capsule 1 capsule  1 capsule Nasogastric Daily Samson Reyes MD   1 capsule at 01/19/19 0900   • levothyroxine (SYNTHROID, LEVOTHROID) tablet 100 mcg  100 mcg Nasogastric Q AM Amy  MD Samson   100 mcg at 01/19/19 0628   • LORazepam (ATIVAN) injection 0.5 mg  0.5 mg Intravenous Q6H PRN Samson Reyes MD   0.5 mg at 01/18/19 2129   • Morphine sulfate (PF) injection 2 mg  2 mg Intravenous Q4H PRN Milad Leone MD, FASN        And   • naloxone (NARCAN) injection 0.4 mg  0.4 mg Intravenous Q5 Min PRN Milad Leone MD, FASN       • ondansetron (ZOFRAN) tablet 4 mg  4 mg Oral Q6H PRN Milad Leone MD, FASN        Or   • ondansetron ODT (ZOFRAN-ODT) disintegrating tablet 4 mg  4 mg Oral Q6H PRN Milad Leone MD, FASN        Or   • ondansetron (ZOFRAN) injection 4 mg  4 mg Intravenous Q6H PRN Milad Leone MD, FASN   4 mg at 01/18/19 0954   • promethazine (PHENERGAN) injection 12.5 mg  12.5 mg Intravenous Q6H PRN Samson Reyes MD   12.5 mg at 01/18/19 1435   • sodium chloride 0.9 % flush 1-10 mL  1-10 mL Intravenous PRN Milad Leone MD, FASN   10 mL at 01/19/19 0048   • sodium chloride 0.9 % flush 3 mL  3 mL Intravenous Q12H Milad Leone MD, FASN   3 mL at 01/19/19 0900   • Sodium chloride 0.9 % infusion  10 mL/hr Intravenous Continuous Milad Leone MD, FASN 10 mL/hr at 01/18/19 0054 10 mL/hr at 01/18/19 0054   • zinc oxide 13 % cream   Topical BID PRN Milad Leone MD, FASN           Assessment/Plan         1.   Acute on chronic renal failure (CMS/HCC): It is likely secondary to hypotensive episodes as well as the use of Bactrim.    2.   Cellulitis of both lower extremities: Continue with the IV antibiotics.  She likely has an abscess on the foot that needs to be addressed  3.   Altered mental status: There is some improvement in her mental status.  4.   Anemia: Transfused as needed  5.   Bilateral edema of lower extremity:  she needs a central line placed the temporary dialysis catheter and use it as a central line and likely will need dialysis in the morning.  6.   GERD (gastroesophageal reflux disease)  7.   Hyperkalemia:  it is resolved at this point  8.   Hypertension: Under  fair control  9.   Hypothermia  10.   Hypothyroidism  11.   Type 2 diabetes mellitus with complication (CMS/HCC)  12.   Vitamin B12 deficiency    Plan:    · She has good urine output, no dialysis needed today.  · Low potassium and increased sodium noted will go ahead and start her on Aldactone 25 mg daily and see how she'll respond to it.  · Have to reassess her volume status and likely will need some form of diuretics.  Hold off any loop diuretics at this point.  · She has already received 1 dose of oral potassium today.  · Blood pressure is fairly stable with the hydralazine can be continued.  · Clinically starting to improve.  · Details were also discussed with the hospitalist service.    · Surveillance labs.  · Further recommendations will depend on clinical course of the patient during the current hospitalization.    · I also discussed the details with the nursing staff.  · Rest as ordered.    Milad Leone MD, GILA  01/19/19  9:45 AM    Dictated utilizing Dragon dictation.

## 2019-01-19 NOTE — PLAN OF CARE
Problem: Patient Care Overview  Goal: Plan of Care Review  Outcome: Ongoing (interventions implemented as appropriate)   01/19/19 1600   Coping/Psychosocial   Plan of Care Reviewed With patient   Plan of Care Review   Progress no change   OTHER   Outcome Summary VSS, no acute event changes over the shift, will continue to monitor       Problem: Fall Risk (Adult)  Goal: Absence of Fall  Outcome: Ongoing (interventions implemented as appropriate)      Problem: Skin Injury Risk (Adult)  Goal: Skin Health and Integrity  Outcome: Ongoing (interventions implemented as appropriate)      Problem: Skin and Soft Tissue Infection (Adult)  Goal: Signs and Symptoms of Listed Potential Problems Will be Absent, Minimized or Managed (Skin and Soft Tissue Infection)  Outcome: Ongoing (interventions implemented as appropriate)      Problem: Wound (Includes Pressure Injury) (Adult)  Goal: Signs and Symptoms of Listed Potential Problems Will be Absent, Minimized or Managed (Wound)  Outcome: Ongoing (interventions implemented as appropriate)

## 2019-01-19 NOTE — PLAN OF CARE
Problem: Patient Care Overview  Goal: Plan of Care Review  Outcome: Ongoing (interventions implemented as appropriate)   01/19/19 0417   Coping/Psychosocial   Plan of Care Reviewed With patient   Plan of Care Review   Progress no change   OTHER   Outcome Summary NO acute events overnight. patient rested well. Continue to monitor.      Goal: Discharge Needs Assessment  Outcome: Ongoing (interventions implemented as appropriate)      Problem: Fall Risk (Adult)  Goal: Identify Related Risk Factors and Signs and Symptoms  Outcome: Outcome(s) achieved Date Met: 01/19/19    Goal: Absence of Fall  Outcome: Ongoing (interventions implemented as appropriate)      Problem: Skin Injury Risk (Adult)  Goal: Identify Related Risk Factors and Signs and Symptoms  Outcome: Outcome(s) achieved Date Met: 01/19/19    Goal: Skin Health and Integrity  Outcome: Ongoing (interventions implemented as appropriate)      Problem: Nutrition, Enteral (Adult)  Goal: Signs and Symptoms of Listed Potential Problems Will be Absent, Minimized or Managed (Nutrition, Enteral)  Outcome: Ongoing (interventions implemented as appropriate)      Problem: Skin and Soft Tissue Infection (Adult)  Goal: Signs and Symptoms of Listed Potential Problems Will be Absent, Minimized or Managed (Skin and Soft Tissue Infection)  Outcome: Ongoing (interventions implemented as appropriate)      Problem: Wound (Includes Pressure Injury) (Adult)  Goal: Signs and Symptoms of Listed Potential Problems Will be Absent, Minimized or Managed (Wound)  Outcome: Ongoing (interventions implemented as appropriate)

## 2019-01-20 LAB
ANION GAP SERPL CALCULATED.3IONS-SCNC: 10.7 MMOL/L (ref 10–20)
BACTERIA SPEC ANAEROBE CULT: NORMAL
BUN BLD-MCNC: 66 MG/DL (ref 7–20)
BUN/CREAT SERPL: 33 (ref 7.1–23.5)
CALCIUM SPEC-SCNC: 8.7 MG/DL (ref 8.4–10.2)
CHLORIDE SERPL-SCNC: 117 MMOL/L (ref 98–107)
CO2 SERPL-SCNC: 26 MMOL/L (ref 26–30)
CREAT BLD-MCNC: 2 MG/DL (ref 0.6–1.3)
GFR SERPL CREATININE-BSD FRML MDRD: 25 ML/MIN/1.73
GFR SERPL CREATININE-BSD FRML MDRD: 31 ML/MIN/1.73
GLUCOSE BLD-MCNC: 205 MG/DL (ref 74–98)
GLUCOSE BLDC GLUCOMTR-MCNC: 148 MG/DL (ref 70–130)
GLUCOSE BLDC GLUCOMTR-MCNC: 177 MG/DL (ref 70–130)
GLUCOSE BLDC GLUCOMTR-MCNC: 181 MG/DL (ref 70–130)
GLUCOSE BLDC GLUCOMTR-MCNC: 186 MG/DL (ref 70–130)
POTASSIUM BLD-SCNC: 3.7 MMOL/L (ref 3.5–5.1)
SODIUM BLD-SCNC: 150 MMOL/L (ref 137–145)

## 2019-01-20 PROCEDURE — 63710000001 INSULIN ASPART PER 5 UNITS: Performed by: INTERNAL MEDICINE

## 2019-01-20 PROCEDURE — 99232 SBSQ HOSP IP/OBS MODERATE 35: CPT | Performed by: INTERNAL MEDICINE

## 2019-01-20 PROCEDURE — 25010000002 ERTAPENEM PER 500 MG: Performed by: INTERNAL MEDICINE

## 2019-01-20 PROCEDURE — 82962 GLUCOSE BLOOD TEST: CPT

## 2019-01-20 PROCEDURE — 63710000001 INSULIN DETEMIR PER 5 UNITS: Performed by: INTERNAL MEDICINE

## 2019-01-20 PROCEDURE — 25010000002 HYDRALAZINE PER 20 MG: Performed by: HOSPITALIST

## 2019-01-20 PROCEDURE — 25010000002 LORAZEPAM PER 2 MG: Performed by: INTERNAL MEDICINE

## 2019-01-20 PROCEDURE — 80048 BASIC METABOLIC PNL TOTAL CA: CPT | Performed by: INTERNAL MEDICINE

## 2019-01-20 PROCEDURE — 25010000002 HYDROCORTISONE SODIUM SUCCINATE 100 MG RECONSTITUTED SOLUTION: Performed by: HOSPITALIST

## 2019-01-20 PROCEDURE — 25010000002 HEPARIN (PORCINE) PER 1000 UNITS: Performed by: INTERNAL MEDICINE

## 2019-01-20 RX ORDER — DEXTROSE MONOHYDRATE 50 MG/ML
125 INJECTION, SOLUTION INTRAVENOUS CONTINUOUS
Status: DISCONTINUED | OUTPATIENT
Start: 2019-01-20 | End: 2019-01-21

## 2019-01-20 RX ORDER — CARVEDILOL 6.25 MG/1
6.25 TABLET ORAL 2 TIMES DAILY WITH MEALS
Status: DISCONTINUED | OUTPATIENT
Start: 2019-01-20 | End: 2019-01-22 | Stop reason: HOSPADM

## 2019-01-20 RX ADMIN — INSULIN DETEMIR 30 UNITS: 100 INJECTION, SOLUTION SUBCUTANEOUS at 06:39

## 2019-01-20 RX ADMIN — HYDRALAZINE HYDROCHLORIDE 25 MG: 25 TABLET ORAL at 06:39

## 2019-01-20 RX ADMIN — SODIUM CHLORIDE, PRESERVATIVE FREE 3 ML: 5 INJECTION INTRAVENOUS at 08:53

## 2019-01-20 RX ADMIN — HYDRALAZINE HYDROCHLORIDE 25 MG: 25 TABLET ORAL at 21:01

## 2019-01-20 RX ADMIN — DEXTROSE MONOHYDRATE 125 ML/HR: 50 INJECTION, SOLUTION INTRAVENOUS at 12:10

## 2019-01-20 RX ADMIN — INSULIN ASPART 2 UNITS: 100 INJECTION, SOLUTION INTRAVENOUS; SUBCUTANEOUS at 06:39

## 2019-01-20 RX ADMIN — HYDROCORTISONE SODIUM SUCCINATE 50 MG: 100 INJECTION, POWDER, FOR SOLUTION INTRAMUSCULAR; INTRAVENOUS at 14:11

## 2019-01-20 RX ADMIN — HEPARIN SODIUM 2400 UNITS: 1000 INJECTION, SOLUTION INTRAVENOUS; SUBCUTANEOUS at 20:55

## 2019-01-20 RX ADMIN — HYDRALAZINE HYDROCHLORIDE 25 MG: 25 TABLET ORAL at 15:09

## 2019-01-20 RX ADMIN — Medication 1 CAPSULE: at 08:52

## 2019-01-20 RX ADMIN — HEPARIN SODIUM 5000 UNITS: 5000 INJECTION, SOLUTION INTRAVENOUS; SUBCUTANEOUS at 21:29

## 2019-01-20 RX ADMIN — HYDROCORTISONE SODIUM SUCCINATE 50 MG: 100 INJECTION, POWDER, FOR SOLUTION INTRAMUSCULAR; INTRAVENOUS at 00:53

## 2019-01-20 RX ADMIN — SODIUM CHLORIDE 10 ML/HR: 9 INJECTION, SOLUTION INTRAVENOUS at 22:16

## 2019-01-20 RX ADMIN — SODIUM CHLORIDE 1 G: 9 INJECTION, SOLUTION INTRAVENOUS at 14:28

## 2019-01-20 RX ADMIN — FAMOTIDINE 20 MG: 10 INJECTION, SOLUTION INTRAVENOUS at 08:53

## 2019-01-20 RX ADMIN — CARVEDILOL 6.25 MG: 6.25 TABLET, FILM COATED ORAL at 08:56

## 2019-01-20 RX ADMIN — SODIUM CHLORIDE, PRESERVATIVE FREE 10 ML: 5 INJECTION INTRAVENOUS at 00:53

## 2019-01-20 RX ADMIN — HYDRALAZINE HYDROCHLORIDE 10 MG: 20 INJECTION INTRAMUSCULAR; INTRAVENOUS at 08:56

## 2019-01-20 RX ADMIN — LEVOTHYROXINE SODIUM 100 MCG: 100 TABLET ORAL at 06:39

## 2019-01-20 RX ADMIN — LORAZEPAM 0.5 MG: 2 INJECTION, SOLUTION INTRAMUSCULAR; INTRAVENOUS at 14:11

## 2019-01-20 RX ADMIN — SODIUM CHLORIDE, PRESERVATIVE FREE 3 ML: 5 INJECTION INTRAVENOUS at 21:30

## 2019-01-20 RX ADMIN — INSULIN ASPART 2 UNITS: 100 INJECTION, SOLUTION INTRAVENOUS; SUBCUTANEOUS at 12:10

## 2019-01-20 RX ADMIN — INSULIN ASPART 2 UNITS: 100 INJECTION, SOLUTION INTRAVENOUS; SUBCUTANEOUS at 22:17

## 2019-01-20 RX ADMIN — SPIRONOLACTONE 25 MG: 25 TABLET, FILM COATED ORAL at 08:53

## 2019-01-20 RX ADMIN — LORAZEPAM 0.5 MG: 2 INJECTION, SOLUTION INTRAMUSCULAR; INTRAVENOUS at 03:05

## 2019-01-20 RX ADMIN — CARVEDILOL 6.25 MG: 6.25 TABLET, FILM COATED ORAL at 17:17

## 2019-01-20 RX ADMIN — HEPARIN SODIUM 5000 UNITS: 5000 INJECTION, SOLUTION INTRAVENOUS; SUBCUTANEOUS at 08:53

## 2019-01-20 NOTE — PLAN OF CARE
Problem: Patient Care Overview  Goal: Plan of Care Review  Outcome: Ongoing (interventions implemented as appropriate)   01/20/19 0456   Coping/Psychosocial   Plan of Care Reviewed With patient   Plan of Care Review   Progress no change   OTHER   Outcome Summary No acute events overnight. Patient has had two episodes of nausea. Continue to monitor.      Goal: Discharge Needs Assessment  Outcome: Ongoing (interventions implemented as appropriate)      Problem: Fall Risk (Adult)  Goal: Absence of Fall  Outcome: Ongoing (interventions implemented as appropriate)      Problem: Skin Injury Risk (Adult)  Goal: Skin Health and Integrity  Outcome: Ongoing (interventions implemented as appropriate)      Problem: Nutrition, Enteral (Adult)  Goal: Signs and Symptoms of Listed Potential Problems Will be Absent, Minimized or Managed (Nutrition, Enteral)  Outcome: Ongoing (interventions implemented as appropriate)      Problem: Skin and Soft Tissue Infection (Adult)  Goal: Signs and Symptoms of Listed Potential Problems Will be Absent, Minimized or Managed (Skin and Soft Tissue Infection)  Outcome: Ongoing (interventions implemented as appropriate)      Problem: Wound (Includes Pressure Injury) (Adult)  Goal: Signs and Symptoms of Listed Potential Problems Will be Absent, Minimized or Managed (Wound)  Outcome: Ongoing (interventions implemented as appropriate)

## 2019-01-20 NOTE — PLAN OF CARE
Problem: Patient Care Overview  Goal: Plan of Care Review  Outcome: Ongoing (interventions implemented as appropriate)   01/20/19 1610   Coping/Psychosocial   Plan of Care Reviewed With patient   Plan of Care Review   Progress no change   OTHER   Outcome Summary VSS, no acute changes, will continue to monitor       Problem: Fall Risk (Adult)  Goal: Absence of Fall  Outcome: Ongoing (interventions implemented as appropriate)      Problem: Skin Injury Risk (Adult)  Goal: Skin Health and Integrity  Outcome: Ongoing (interventions implemented as appropriate)      Problem: Skin and Soft Tissue Infection (Adult)  Goal: Signs and Symptoms of Listed Potential Problems Will be Absent, Minimized or Managed (Skin and Soft Tissue Infection)  Outcome: Ongoing (interventions implemented as appropriate)      Problem: Wound (Includes Pressure Injury) (Adult)  Goal: Signs and Symptoms of Listed Potential Problems Will be Absent, Minimized or Managed (Wound)  Outcome: Ongoing (interventions implemented as appropriate)

## 2019-01-20 NOTE — PROGRESS NOTES
[facility]    Name:  Millicent Mckeon   Age:  58 y.o.  Sex:  female  :  1960  MRN:  [id number]   Visit Number:  [id number]  Admission Date:  1/10/2019  Date Of Service:  19  Primary Care Physician:  Marianela Albright APRN     LOS: 10 days :  Patient Care Team:  Marianela Albright APRN as PCP - General (Family Medicine)  Geremias Shepherd MD as Consulting Physician (General Surgery):    Chief Complaint:      Generalized weakness.    Subjective / Interval History:     Ms. Mckeon is currently lying down on the bed and is comfortable at rest.  She is answering questions but at times does get confused.  She refused to work with physical therapy yesterday. She was transferred out of ICU on 2019.  She was seen by speech therapy and they have recommended puréed diet with nectar thick liquids.  She has not had any dialysis since last 3 days.  Her Wilkinson catheter was discontinued on 2019.  No significant overnight events.    She was transferred from [facility] emergency room on 1/10/2019 with symptoms of generalized weakness, altered mental status, shortness of breath and leg cellulitis.  She was noted to have hypothermia and hypotension and was placed on warming blanket on presentation.  She was placed on broad-spectrum IV antibiotic therapy with Zosyn and vancomycin and a consultation was obtained from Dr. Rueda from podiatry.  She was also noted to have acute renal failure and has been followed by Dr. Leone.  According to the sister Virginia, patient was in her normal state of health about 2 weeks ago since when she started having generalized weakness and worsening leg swelling.  She has morbid obesity and apparently uses a wheelchair.  Patient's recent CT scan of the abdomen and pelvis done at [facility] emergency room noted lymphadenopathy especially in the retroperitoneal and bilateral inguinal regions as well as paratracheal  and retrocrural. Our initial CT scan reports done here did not mention these findings but I discussed with our radiologist on 1/14/2019 and he reviewed the scans done here and he stated that he does see the lymph nodes in the above-mentioned areas.  He also stated that the patient has a tiny nodule on the left lobe of the thyroid but he did not think it was 2 cm in size.  She was seen by Dr. Theodore from oncology on 1/16/2019.  He recommended outpatient follow-up for her lymphadenopathy and possible outpatient lymph node biopsy.     Patient did undergo right foot surgical debridement by Dr. Rueda on 1/14/2019.  Patient was also started on temporary hemodialysis on 1/15/2019.  After foot surgery, patient started improving slowly with regards to her mental status.  Patient was on tube feeds which he has tolerated well.  Her NG tube was discontinued on 1/17/2019 and she has been placed on puréed diet with nectar thick liquids.  She was transferred out of the ICU on 1/17/2019.  Dr. Rueda as advised wound VAC placement on Monday.    Review of Systems:     General ROS: Patient denies any fevers, chills or loss of consciousness.  Complains of generalized weakness.  Respiratory ROS: Complains of cough and chest congestion.  Cardiovascular ROS: Denies chest pain or palpitations. No history of exertional chest pain.  Gastrointestinal ROS: Complains of nausea and heartburn.  Neurological ROS: Denies any focal weakness. No loss of consciousness. Denies any numbness.    Vital Signs:    Temp:  [98.1 °F (36.7 °C)-98.4 °F (36.9 °C)] 98.1 °F (36.7 °C)  Heart Rate:  [76-85] 76  Resp:  [16-18] 18  BP: (154-184)/(57-82) 154/57    Intake and output:    I/O last 3 completed shifts:  In: 10 [P.O.:10]  Out: -   I/O this shift:  In: 120 [P.O.:120]  Out: -     Physical Examination:    General Appearance:  Alert and oriented to person, not in any acute distress.   Head:  Atraumatic and normocephalic, without obvious abnormality.   Eyes:           PERRLA, conjunctivae and sclerae normal, no Icterus. No pallor.  Extraocular movements are within normal limits.     Neck: Supple, short neck, trachea midline, no thyromegaly, no carotid bruit.   Lungs:   Chest shape is normal. Breath sounds decreased bilaterally due to thick chest wall.  No wheezing.  Bilateral scattered crackles heard. No Pleural rub or bronchial breathing.  Right subclavian central line is in place.   Heart:  Normal S1 and S2, no murmur, no gallop, no rub. No JVD   Abdomen:   Normal bowel sounds, no masses, no organomegaly. Soft, non-tender, obese with a large pannus with healing ulcers noted on the skin.   Extremities: Large lower extremities due to obesity with multiple skin folds that are indurated and hyperemic.  Superficial skin ulcer noted on the lateral aspect of the right leg without any purulent discharge.  Surgical bandage is noted on the right foot.  Wound noted on the medial aspect of the right thigh with purulent slough.  Excoriations noted on bilateral legs and feet.     Skin: As described above.  Please see nurse's notes and the photographs in the chart for further details.  Lower extremity redness has significantly improved in the last several days.     Neurologic: Alert and oriented to place and person.  Moves all 4 limbs equally but does have significant generalized weakness.       Laboratory results:    Results from last 7 days   Lab Units 01/20/19  0556 01/19/19  0525 01/18/19  0515  01/14/19  0414   SODIUM mmol/L 150* 147* 143   < > 138   POTASSIUM mmol/L 3.7 3.3* 3.4*   < > 4.2   CHLORIDE mmol/L 117* 114* 112*   < > 109*   CO2 mmol/L 26.0 25.0* 25.0*   < > 19.0*   BUN mg/dL 66* 66* 65*   < > 82*   CREATININE mg/dL 2.00* 1.80* 1.90*   < > 4.00*   CALCIUM mg/dL 8.7 8.8 8.5   < > 7.9*   BILIRUBIN mg/dL  --   --   --   --  0.4   ALK PHOS U/L  --   --   --   --  272*   ALT (SGPT) U/L  --   --   --   --  27   AST (SGOT) U/L  --   --   --   --  16   GLUCOSE mg/dL 205* 128* 156*   <  > 181*    < > = values in this interval not displayed.     Results from last 7 days   Lab Units 01/19/19  0525 01/18/19  0515 01/17/19  0428   WBC 10*3/mm3 7.84 7.61 11.00*   HEMOGLOBIN g/dL 9.0* 8.4* 8.2*   HEMATOCRIT % 30.1* 27.6* 26.8*   PLATELETS 10*3/mm3 209 139 108*             Results from last 7 days   Lab Units 01/14/19  1354   WOUNDCX  Light growth (2+) Proteus vulgaris*  Klebsiella pneumoniae ESBL*     I have reviewed the patient's laboratory results.    Radiology results:    Imaging Results (last 24 hours)     ** No results found for the last 24 hours. **        Medication Review:     I have reviewed the patients active and prn medications.       Cellulitis of both lower extremities    Anemia    Bilateral edema of lower extremity    Cellulitis of lower extremity    Acute on chronic renal failure (CMS/[facility])    GERD (gastroesophageal reflux disease)    Hyperkalemia    Essential hypertension    Hypothermia    Hypothyroidism    Type 2 diabetes mellitus with complication (CMS/[facility])    Vitamin B12 deficiency    Acute metabolic encephalopathy    Assessment:    1.  Sepsis with hypothermia, present on admission, improving.  2.  Bilateral lower extremity cellulitis with ESBL Klebsiella, present on admission, improving.  3.  Acute metabolic encephalopathy, present on admission, improving.  4.  Acute renal failure, present on admission.  5.  Hyperkalemia, present on admission, improved.  6.  Acquired hypothyroidism, uncontrolled.  7.  Diabetes mellitus type 2 with nephropathy.  8.  Morbid obesity with a BMI of 63.  9.  Chronic venous insufficiency of the lower extremities.  10. Retrocrural, paratracheal, retro-peritoneal, bilateral inguinal lymphadenopathy, uncertain etiology.  11.  Left thyroid nodule 2 cm on recent CT scan.  12.  Thrombocytopenia likely secondary to sepsis, improving.    Plan:    Ms. Mckeon continues to improve but still has significant generalized weakness.  Unfortunately, she has not been able  to get out of bed since admission.  Her sepsis has improved.  She is currently on Invanz for her right ulcer and infection.  Wound culture is growing ESBL Klebsiella.  Her nasal swab for MRSA has been negative.  Blood cultures have been negative so far. She is being followed by Dr. Leone and her renal function is slowly improving and she may not need further dialysis.      Patient will be continued on physical and occupational therapy.  As per Dr. Rueda, she will need wound VAC placement for her right foot tomorrow.  She will need short-term rehabilitation at the time of discharge.  She is on heparin for DVT prophylaxis.  She does have uncontrolled blood pressures.  She has been started on spironolactone yesterday.  She is already on hydralazine and I will place her on carvedilol as well.  She will need follow-up with Dr. Theodore for her retroperitoneal and mediastinal lymphadenopathy.        Samson Reyes MD  01/20/19  1:55 PM    Dictated utilizing Dragon dictation.

## 2019-01-20 NOTE — PROGRESS NOTES
"Nephrology Progress Note.    LOS: 10 days    Patient Care Team:  Marianela Albright APRN as PCP - General (Family Medicine)  Geremias Shepherd MD as Consulting Physician (General Surgery)    Chief Complaint:  No chief complaint on file.      Subjective     Follow up for FREDDY and related issues.    Interval History:     Patient Complaints: none    Patient seen and examined this morning.  Events from last night noted.  She is again drowsy and sleepy today will not wake up or have any significant interaction.  She didn't even say she wants to go home today.  Not much interactive.    Review of Systems:    No meaningful review of system possible.      Objective     Vital Signs  BP (!) 184/82 (BP Location: Left arm, Patient Position: Lying)   Pulse 85   Temp 98.1 °F (36.7 °C) (Oral)   Resp 18   Ht 165.1 cm (65\")   Wt (!) 147 kg (324 lb 4.8 oz)   SpO2 94%   BMI 53.97 kg/m²       I/O this shift:  In: 120 [P.O.:120]  Out: -     Intake/Output Summary (Last 24 hours) at 1/20/2019 1028  Last data filed at 1/20/2019 0834  Gross per 24 hour   Intake 130 ml   Output --   Net 130 ml       Physical Exam:    General Appearance:  no acute distress,   HEENT: Oral mucosa dry, extra occular movements intact. Sclera clear.  Skin: Warm and dry  Neck: supple, no JVD, trachea midline  Lungs:Chest shape is normal. Breath sounds heard bilaterally equally. No crackles, No wheezing.   Heart: regular rate and rhythm. normal S1 and S2, no S3, no rub, peripheral pulses weak but palpable.  Abdomen: Obese, soft, non-tender,  present bowel sounds to auscultation  : no palpable bladder.  Extremities: 1-2+ edema, no cyanosis or clubbing.   Neuro: She does have interaction and move her extremities randomly.     Results Review:    Results from last 7 days   Lab Units 01/20/19  0556 01/19/19  0525 01/18/19  0515  01/14/19  0414   SODIUM mmol/L 150* 147* 143   < > 138   POTASSIUM mmol/L 3.7 3.3* 3.4*   < > 4.2   CHLORIDE mmol/L 117* 114* 112*   < > 109* "   CO2 mmol/L 26.0 25.0* 25.0*   < > 19.0*   BUN mg/dL 66* 66* 65*   < > 82*   CREATININE mg/dL 2.00* 1.80* 1.90*   < > 4.00*   CALCIUM mg/dL 8.7 8.8 8.5   < > 7.9*   BILIRUBIN mg/dL  --   --   --   --  0.4   ALK PHOS U/L  --   --   --   --  272*   ALT (SGPT) U/L  --   --   --   --  27   AST (SGOT) U/L  --   --   --   --  16   GLUCOSE mg/dL 205* 128* 156*   < > 181*    < > = values in this interval not displayed.       Estimated Creatinine Clearance: 43.9 mL/min (A) (by C-G formula based on SCr of 2 mg/dL (H)).    Results from last 7 days   Lab Units 01/16/19  0537   MAGNESIUM mg/dL 2.5*             Results from last 7 days   Lab Units 01/19/19  0525 01/18/19  0515 01/17/19  0428 01/16/19  0537 01/15/19  0418   WBC 10*3/mm3 7.84 7.61 11.00* 13.99* 19.82*   HEMOGLOBIN g/dL 9.0* 8.4* 8.2* 8.4* 8.1*   PLATELETS 10*3/mm3 209 139 108* 79* 83*               Imaging Results (last 24 hours)     ** No results found for the last 24 hours. **          carvedilol 6.25 mg Oral BID With Meals   ertapenem 1 g Intravenous Q24H   famotidine 20 mg Intravenous Daily   heparin (porcine) 5,000 Units Subcutaneous Q12H   hydrALAZINE 25 mg Nasogastric Q8H   hydrocortisone sodium succinate 50 mg Intravenous Q12H   insulin aspart 0-7 Units Subcutaneous 4x Daily AC & at Bedtime   insulin detemir 30 Units Subcutaneous QAM   lactobacillus acidophilus 1 capsule Nasogastric Daily   levothyroxine 100 mcg Nasogastric Q AM   sodium chloride 3 mL Intravenous Q12H   spironolactone 25 mg Oral Daily       sodium chloride 10 mL/hr Last Rate: 10 mL/hr (01/18/19 0054)       Medication Review:   Current Facility-Administered Medications   Medication Dose Route Frequency Provider Last Rate Last Dose   • acetaminophen (TYLENOL) tablet 650 mg  650 mg Oral Q4H PRN Milad Leone MD, FASN       • aluminum-magnesium hydroxide-simethicone (MAALOX MAX) 400-400-40 MG/5ML suspension 15 mL  15 mL Oral Q6H PRN Samson Reyes MD   15 mL at 01/19/19 0458   • carvedilol  (COREG) tablet 6.25 mg  6.25 mg Oral BID With Meals Samson Reyes MD   6.25 mg at 01/20/19 0856   • CHAPSTICK 1 each  1 each Apply externally Q1H PRN Samson Reyes MD   1 each at 01/16/19 1155   • dextrose (D50W) 25 g/ 50mL Intravenous Solution 25 g  25 g Intravenous Q15 Min PRN Milad Leone MD, FASN       • dextrose (GLUTOSE) oral gel 1 tube  1 tube Oral Q15 Min PRN Milad Leone MD, GILA       • ertapenem (INVanz) 1 g in sodium chloride 0.9 % 100 mL IVPB-MBP  1 g Intravenous Q24H Milad Leone MD, FASN 200 mL/hr at 01/19/19 1343 1 g at 01/19/19 1343   • famotidine (PEPCID) injection 20 mg  20 mg Intravenous Daily Liz Gregg MD   20 mg at 01/20/19 0853   • glucagon (human recombinant) (GLUCAGEN DIAGNOSTIC) injection 1 mg  1 mg Subcutaneous PRN Milad Leone MD, FASN       • glycerin 35 % liquid 35% 2 spray  2 spray Mouth/Throat Q2H PRN Maribel Wheeler DO   2 spray at 01/16/19 0857   • heparin (porcine) 5000 UNIT/ML injection 5,000 Units  5,000 Units Subcutaneous Q12H Samson Reyes MD   5,000 Units at 01/20/19 0853   • heparin (porcine) injection 2,400 Units  2,400 Units Intracatheter PRN Milad Leone MD, FASN   2,400 Units at 01/15/19 1502   • hydrALAZINE (APRESOLINE) injection 10 mg  10 mg Intravenous Q6H PRN Liz Gregg MD   10 mg at 01/20/19 0856   • hydrALAZINE (APRESOLINE) tablet 25 mg  25 mg Nasogastric Q8H Samson Reyes MD   25 mg at 01/20/19 0639   • hydrocortisone sodium succinate (Solu-CORTEF) injection 50 mg  50 mg Intravenous Q12H Liz Gregg MD   50 mg at 01/20/19 0053   • insulin aspart (novoLOG) injection 0-7 Units  0-7 Units Subcutaneous 4x Daily AC & at Bedtime Samson Reyes MD   2 Units at 01/20/19 0639   • insulin detemir (LEVEMIR) injection 30 Units  30 Units Subcutaneous QAM Samson Reyes MD   30 Units at 01/20/19 0639   • lactobacillus acidophilus (RISAQUAD) capsule 1 capsule  1 capsule Nasogastric Daily Samson Reyes MD   1 capsule at 01/20/19 0852   •  levothyroxine (SYNTHROID, LEVOTHROID) tablet 100 mcg  100 mcg Nasogastric Q AM Samson Reyes MD   100 mcg at 01/20/19 0639   • LORazepam (ATIVAN) injection 0.5 mg  0.5 mg Intravenous Q6H PRN Samson Reyes MD   0.5 mg at 01/20/19 0305   • Morphine sulfate (PF) injection 2 mg  2 mg Intravenous Q4H PRN Milad Leone MD, FASN        And   • naloxone (NARCAN) injection 0.4 mg  0.4 mg Intravenous Q5 Min PRN Milad Leone MD, FASN       • ondansetron (ZOFRAN) tablet 4 mg  4 mg Oral Q6H PRN Milad Leone MD, FASN        Or   • ondansetron ODT (ZOFRAN-ODT) disintegrating tablet 4 mg  4 mg Oral Q6H PRN Milad Leone MD, FASN        Or   • ondansetron (ZOFRAN) injection 4 mg  4 mg Intravenous Q6H PRN Milad Leone MD, FASN   4 mg at 01/19/19 1659   • promethazine (PHENERGAN) injection 12.5 mg  12.5 mg Intravenous Q6H PRN Samson Reyes MD   12.5 mg at 01/19/19 2041   • sodium chloride 0.9 % flush 1-10 mL  1-10 mL Intravenous PRN Milad Leone MD, FASN   10 mL at 01/20/19 0053   • sodium chloride 0.9 % flush 3 mL  3 mL Intravenous Q12H Milad Leone MD, FASN   3 mL at 01/20/19 0853   • Sodium chloride 0.9 % infusion  10 mL/hr Intravenous Continuous Milad Leone MD, FASN 10 mL/hr at 01/18/19 0054 10 mL/hr at 01/18/19 0054   • spironolactone (ALDACTONE) tablet 25 mg  25 mg Oral Daily Milad Leone MD, FASN   25 mg at 01/20/19 0853   • zinc oxide 13 % cream   Topical BID PRN Sulema, Milad, MD, FASN           Assessment/Plan         1.   Acute on chronic renal failure (CMS/HCC): It is likely secondary to hypotensive episodes as well as the use of Bactrim.    2.   Cellulitis of both lower extremities: Continue with the IV antibiotics.  She likely has an abscess on the foot that needs to be addressed  3.   Altered mental status: There is some improvement in her mental status.  4.   Anemia: Transfused as needed  5.   Bilateral edema of lower extremity:  she needs a central line placed the temporary dialysis catheter  and use it as a central line and likely will need dialysis in the morning.  6.   GERD (gastroesophageal reflux disease)  7.   Hyperkalemia:  it is resolved at this point  8.   Hypertension: Under fair control  9.   Hypothermia  10.   Hypothyroidism  11.   Type 2 diabetes mellitus with complication (CMS/[facility])  12.   Vitamin B12 deficiency    Plan:    · Worsening renal function noted.  · Increased sodium as well.  She's not eating or drinking much.  She does not have NG tube.  · Potassium is better.  Continue with the Aldactone.  · Have to reassess her volume status and likely will need some form of diuretics.  Hold off any loop diuretics at this point.  · She'll be started on D5W and reassess serum sodium on day-to-day basis.  · Blood pressure is fairly stable with the hydralazine can be continued.  · Details were also discussed with the hospitalist service.    · Surveillance labs.  · Further recommendations will depend on clinical course of the patient during the current hospitalization.    · I also discussed the details with the nursing staff.  · Rest as ordered.    Milad Leone MD, FASN  01/20/19  10:28 AM    Dictated utilizing Dragon dictation.

## 2019-01-20 NOTE — SIGNIFICANT NOTE
01/20/19 1857   PT Deferred Reason   PT Deferred Reason Patient/family declined treatment  (Pt refused stating I don't want to do it )

## 2019-01-20 NOTE — SIGNIFICANT NOTE
"   01/19/19 1959   PT Deferred Reason   PT Deferred Reason Patient/family declined treatment  (\"I don't feel like it\" f/u with pt tomorrow)     "

## 2019-01-21 LAB
ALBUMIN SERPL-MCNC: 3.1 G/DL (ref 3.5–5)
ANION GAP SERPL CALCULATED.3IONS-SCNC: 8.5 MMOL/L (ref 10–20)
BUN BLD-MCNC: 62 MG/DL (ref 7–20)
BUN/CREAT SERPL: 34.4 (ref 7.1–23.5)
CALCIUM SPEC-SCNC: 8.6 MG/DL (ref 8.4–10.2)
CHLORIDE SERPL-SCNC: 119 MMOL/L (ref 98–107)
CO2 SERPL-SCNC: 26 MMOL/L (ref 26–30)
CREAT BLD-MCNC: 1.8 MG/DL (ref 0.6–1.3)
DEPRECATED RDW RBC AUTO: 70.8 FL (ref 37–54)
ERYTHROCYTE [DISTWIDTH] IN BLOOD BY AUTOMATED COUNT: 21.8 % (ref 11.5–14.5)
GFR SERPL CREATININE-BSD FRML MDRD: 29 ML/MIN/1.73
GFR SERPL CREATININE-BSD FRML MDRD: 35 ML/MIN/1.73
GLUCOSE BLD-MCNC: 159 MG/DL (ref 74–98)
GLUCOSE BLDC GLUCOMTR-MCNC: 183 MG/DL (ref 70–130)
GLUCOSE BLDC GLUCOMTR-MCNC: 245 MG/DL (ref 70–130)
GLUCOSE BLDC GLUCOMTR-MCNC: 260 MG/DL (ref 70–130)
GLUCOSE BLDC GLUCOMTR-MCNC: 311 MG/DL (ref 70–130)
HCT VFR BLD AUTO: 29.1 % (ref 37–47)
HGB BLD-MCNC: 8.3 G/DL (ref 12–16)
MCH RBC QN AUTO: 25.5 PG (ref 27–31)
MCHC RBC AUTO-ENTMCNC: 28.5 G/DL (ref 30–37)
MCV RBC AUTO: 89.5 FL (ref 81–99)
PHOSPHATE SERPL-MCNC: 3.6 MG/DL (ref 2.5–4.5)
PLATELET # BLD AUTO: 228 10*3/MM3 (ref 130–400)
PMV BLD AUTO: 10.4 FL (ref 6–12)
POTASSIUM BLD-SCNC: 3.5 MMOL/L (ref 3.5–5.1)
RBC # BLD AUTO: 3.25 10*6/MM3 (ref 4.2–5.4)
SODIUM BLD-SCNC: 150 MMOL/L (ref 137–145)
WBC NRBC COR # BLD: 6.8 10*3/MM3 (ref 4.8–10.8)

## 2019-01-21 PROCEDURE — 99232 SBSQ HOSP IP/OBS MODERATE 35: CPT | Performed by: INTERNAL MEDICINE

## 2019-01-21 PROCEDURE — 63710000001 INSULIN ASPART PER 5 UNITS: Performed by: INTERNAL MEDICINE

## 2019-01-21 PROCEDURE — 25010000002 HYDROCORTISONE SODIUM SUCCINATE 100 MG RECONSTITUTED SOLUTION: Performed by: HOSPITALIST

## 2019-01-21 PROCEDURE — 92526 ORAL FUNCTION THERAPY: CPT

## 2019-01-21 PROCEDURE — 63710000001 INSULIN DETEMIR PER 5 UNITS: Performed by: INTERNAL MEDICINE

## 2019-01-21 PROCEDURE — 99232 SBSQ HOSP IP/OBS MODERATE 35: CPT | Performed by: PODIATRIST

## 2019-01-21 PROCEDURE — 85027 COMPLETE CBC AUTOMATED: CPT | Performed by: INTERNAL MEDICINE

## 2019-01-21 PROCEDURE — 25010000002 HEPARIN (PORCINE) PER 1000 UNITS: Performed by: INTERNAL MEDICINE

## 2019-01-21 PROCEDURE — 25010000002 POTASSIUM CHLORIDE PER 2 MEQ OF POTASSIUM: Performed by: INTERNAL MEDICINE

## 2019-01-21 PROCEDURE — 97110 THERAPEUTIC EXERCISES: CPT

## 2019-01-21 PROCEDURE — 97530 THERAPEUTIC ACTIVITIES: CPT

## 2019-01-21 PROCEDURE — 80069 RENAL FUNCTION PANEL: CPT | Performed by: INTERNAL MEDICINE

## 2019-01-21 PROCEDURE — 82962 GLUCOSE BLOOD TEST: CPT

## 2019-01-21 PROCEDURE — 97605 NEG PRS WND THER DME<=50SQCM: CPT

## 2019-01-21 RX ADMIN — INSULIN ASPART 3 UNITS: 100 INJECTION, SOLUTION INTRAVENOUS; SUBCUTANEOUS at 17:12

## 2019-01-21 RX ADMIN — FAMOTIDINE 20 MG: 10 INJECTION, SOLUTION INTRAVENOUS at 09:07

## 2019-01-21 RX ADMIN — HYDRALAZINE HYDROCHLORIDE 25 MG: 25 TABLET ORAL at 22:23

## 2019-01-21 RX ADMIN — DEXTROSE MONOHYDRATE 125 ML/HR: 50 INJECTION, SOLUTION INTRAVENOUS at 07:38

## 2019-01-21 RX ADMIN — HEPARIN SODIUM 5000 UNITS: 5000 INJECTION, SOLUTION INTRAVENOUS; SUBCUTANEOUS at 22:16

## 2019-01-21 RX ADMIN — SODIUM CHLORIDE, PRESERVATIVE FREE 3 ML: 5 INJECTION INTRAVENOUS at 22:22

## 2019-01-21 RX ADMIN — HYDROCORTISONE SODIUM SUCCINATE 50 MG: 100 INJECTION, POWDER, FOR SOLUTION INTRAMUSCULAR; INTRAVENOUS at 01:28

## 2019-01-21 RX ADMIN — SPIRONOLACTONE 25 MG: 25 TABLET, FILM COATED ORAL at 09:06

## 2019-01-21 RX ADMIN — HYDRALAZINE HYDROCHLORIDE 25 MG: 25 TABLET ORAL at 06:01

## 2019-01-21 RX ADMIN — LEVOTHYROXINE SODIUM 100 MCG: 100 TABLET ORAL at 05:59

## 2019-01-21 RX ADMIN — INSULIN DETEMIR 30 UNITS: 100 INJECTION, SOLUTION SUBCUTANEOUS at 07:37

## 2019-01-21 RX ADMIN — INSULIN ASPART 2 UNITS: 100 INJECTION, SOLUTION INTRAVENOUS; SUBCUTANEOUS at 07:37

## 2019-01-21 RX ADMIN — POTASSIUM CHLORIDE 250 ML/HR: 2 INJECTION, SOLUTION, CONCENTRATE INTRAVENOUS at 09:09

## 2019-01-21 RX ADMIN — CARVEDILOL 6.25 MG: 6.25 TABLET, FILM COATED ORAL at 17:11

## 2019-01-21 RX ADMIN — POTASSIUM CHLORIDE 250 ML/HR: 2 INJECTION, SOLUTION, CONCENTRATE INTRAVENOUS at 13:50

## 2019-01-21 RX ADMIN — Medication 1 CAPSULE: at 09:07

## 2019-01-21 RX ADMIN — POTASSIUM CHLORIDE 250 ML/HR: 2 INJECTION, SOLUTION, CONCENTRATE INTRAVENOUS at 18:20

## 2019-01-21 RX ADMIN — HEPARIN SODIUM 5000 UNITS: 5000 INJECTION, SOLUTION INTRAVENOUS; SUBCUTANEOUS at 09:06

## 2019-01-21 RX ADMIN — HYDROCORTISONE SODIUM SUCCINATE 50 MG: 100 INJECTION, POWDER, FOR SOLUTION INTRAMUSCULAR; INTRAVENOUS at 13:47

## 2019-01-21 RX ADMIN — INSULIN ASPART 4 UNITS: 100 INJECTION, SOLUTION INTRAVENOUS; SUBCUTANEOUS at 11:53

## 2019-01-21 RX ADMIN — POTASSIUM CHLORIDE 250 ML/HR: 2 INJECTION, SOLUTION, CONCENTRATE INTRAVENOUS at 22:32

## 2019-01-21 RX ADMIN — HYDRALAZINE HYDROCHLORIDE 25 MG: 25 TABLET ORAL at 13:47

## 2019-01-21 RX ADMIN — INSULIN ASPART 5 UNITS: 100 INJECTION, SOLUTION INTRAVENOUS; SUBCUTANEOUS at 22:21

## 2019-01-21 RX ADMIN — CARVEDILOL 6.25 MG: 6.25 TABLET, FILM COATED ORAL at 09:08

## 2019-01-21 NOTE — PROGRESS NOTES
[facility]    Name:  Millicent Mckeon   Age:  58 y.o.  Sex:  female  :  1960  MRN:  [id number]   Visit Number:  [id number]  Admission Date:  1/10/2019  Date Of Service:  19  Primary Care Physician:  Marianela Albright APRN     LOS: 11 days :  Patient Care Team:  Marianela Albright APRN as PCP - General (Family Medicine)  Geremias Shepherd MD as Consulting Physician (General Surgery):    Chief Complaint:      Generalized weakness.    Subjective / Interval History:     Ms. Mckeon is currently lying down on the bed and is comfortable at rest.  She is drowsy and has been refusing to work with physical therapy.  She has also been refusing to eat because she does not like puréed diet.  She was seen by speech therapy again today and they have changed her diet to mechanical soft with thin liquids.  She was transferred out of ICU on 2019.  Her renal function is improving and she has not had any further hemodialysis.  Her Wilkinson catheter was discontinued on 2019.  No significant overnight events.    She was transferred from [facility] emergency room on 1/10/2019 with symptoms of generalized weakness, altered mental status, shortness of breath and leg cellulitis.  She was noted to have hypothermia and hypotension and was placed on warming blanket on presentation.  She was placed on broad-spectrum IV antibiotic therapy with Zosyn and vancomycin and a consultation was obtained from Dr. Rueda from podiatry.  She was also noted to have acute renal failure and has been followed by Dr. Leone.  According to the sister Virginia, patient was in her normal state of health about 2 weeks ago since when she started having generalized weakness and worsening leg swelling.  She has morbid obesity and apparently uses a wheelchair.  Patient's recent CT scan of the abdomen and pelvis done at [facility] emergency room noted lymphadenopathy especially in  the retroperitoneal and bilateral inguinal regions as well as paratracheal and retrocrural. Our initial CT scan reports done here did not mention these findings but I discussed with our radiologist on 1/14/2019 and he reviewed the scans done here and he stated that he does see the lymph nodes in the above-mentioned areas.  He also stated that the patient has a tiny nodule on the left lobe of the thyroid but he did not think it was 2 cm in size.  She was seen by Dr. Thedoore from oncology on 1/16/2019.  He recommended outpatient follow-up for her lymphadenopathy and possible outpatient lymph node biopsy.     Patient did undergo right foot surgical debridement by Dr. Rueda on 1/14/2019.  Patient was also started on temporary hemodialysis on 1/15/2019.  After foot surgery, patient started improving slowly with regards to her mental status.  Patient was on tube feeds which he has tolerated well.  Her NG tube was discontinued on 1/17/2019 and she has been placed on puréed diet with nectar thick liquids.  She was transferred out of the ICU on 1/17/2019.  Dr. Rueda as advised wound VAC placement.    Review of Systems:     General ROS: Patient denies any fevers, chills or loss of consciousness.  Complains of generalized weakness.  Respiratory ROS: Complains of cough and chest congestion.  Cardiovascular ROS: Denies chest pain or palpitations. No history of exertional chest pain.  Gastrointestinal ROS: Complains of nausea and heartburn.  Neurological ROS: Denies any focal weakness. No loss of consciousness. Denies any numbness.    Vital Signs:    Temp:  [97.5 °F (36.4 °C)-98.2 °F (36.8 °C)] 97.7 °F (36.5 °C)  Heart Rate:  [69-78] 74  Resp:  [16-18] 18  BP: (154-182)/(65-80) 168/76    Intake and output:    I/O last 3 completed shifts:  In: 1140 [P.O.:140; I.V.:1000]  Out: -   I/O this shift:  In: 1000 [I.V.:1000]  Out: -     Physical Examination:    General Appearance:  Alert and oriented to person, not in any acute distress.    Head:  Atraumatic and normocephalic, without obvious abnormality.   Eyes:          PERRLA, conjunctivae and sclerae normal, no Icterus. No pallor.  Extraocular movements are within normal limits.     Neck: Supple, short neck, trachea midline, no thyromegaly, no carotid bruit.   Lungs:   Chest shape is normal. Breath sounds decreased bilaterally due to thick chest wall.  No wheezing.  Bilateral scattered crackles heard. No Pleural rub or bronchial breathing.  Right subclavian central line is in place.   Heart:  Normal S1 and S2, no murmur, no gallop, no rub. No JVD   Abdomen:   Normal bowel sounds, no masses, no organomegaly. Soft, non-tender, obese with a large pannus with healing ulcers noted on the skin.   Extremities: Large lower extremities due to obesity with multiple skin folds that are indurated and hyperemic.  Superficial skin ulcer noted on the lateral aspect of the right leg without any purulent discharge.  Surgical bandage is noted on the right foot.  Wound noted on the medial aspect of the right thigh with purulent slough.  Excoriations noted on bilateral legs and feet.     Skin: As described above.  Please see nurse's notes and the photographs in the chart for further details.  Lower extremity redness has significantly improved in the last several days.     Neurologic: Alert and oriented to place and person.  Moves all 4 limbs equally but does have significant generalized weakness.       Laboratory results:    Results from last 7 days   Lab Units 01/21/19  0457 01/20/19  0556 01/19/19  0525   SODIUM mmol/L 150* 150* 147*   POTASSIUM mmol/L 3.5 3.7 3.3*   CHLORIDE mmol/L 119* 117* 114*   CO2 mmol/L 26.0 26.0 25.0*   BUN mg/dL 62* 66* 66*   CREATININE mg/dL 1.80* 2.00* 1.80*   CALCIUM mg/dL 8.6 8.7 8.8   GLUCOSE mg/dL 159* 205* 128*     Results from last 7 days   Lab Units 01/21/19  0457 01/19/19  0525 01/18/19  0515   WBC 10*3/mm3 6.80 7.84 7.61   HEMOGLOBIN g/dL 8.3* 9.0* 8.4*   HEMATOCRIT % 29.1*  30.1* 27.6*   PLATELETS 10*3/mm3 228 209 139             Results from last 7 days   Lab Units 01/14/19  1354   WOUNDCX  Light growth (2+) Proteus vulgaris*  Klebsiella pneumoniae ESBL*     I have reviewed the patient's laboratory results.    Radiology results:    Imaging Results (last 24 hours)     ** No results found for the last 24 hours. **        Medication Review:     I have reviewed the patients active and prn medications.       Cellulitis of both lower extremities    Anemia    Bilateral edema of lower extremity    Cellulitis of lower extremity    Acute on chronic renal failure (CMS/[facility])    GERD (gastroesophageal reflux disease)    Hyperkalemia    Essential hypertension    Hypothermia    Hypothyroidism    Type 2 diabetes mellitus with complication (CMS/[facility])    Vitamin B12 deficiency    Acute metabolic encephalopathy    Assessment:    1.  Sepsis with hypothermia, present on admission, improving.  2.  Bilateral lower extremity cellulitis with ESBL Klebsiella, present on admission, improving.  3.  Acute metabolic encephalopathy, present on admission, improving.  4.  Acute renal failure, present on admission.  5.  Hyperkalemia, present on admission, improved.  6.  Acquired hypothyroidism, uncontrolled.  7.  Diabetes mellitus type 2 with nephropathy.  8.  Morbid obesity with a BMI of 63.  9.  Chronic venous insufficiency of the lower extremities.  10. Retrocrural, paratracheal, retro-peritoneal, bilateral inguinal lymphadenopathy, uncertain etiology.  11.  Left thyroid nodule 2 cm on recent CT scan.  12.  Thrombocytopenia likely secondary to sepsis, improving.    Plan:    Ms. Mckeon continues to improve but still has significant generalized weakness.  Unfortunately, she has not been able to get out of bed since admission.  She also hasn't had any good oral intake and has elevated sodium levels today.  I will place her on gentle IV hydration.  We will change her diet to mechanical soft with thin liquids.  She is  currently on Invanz for her right ulcer and infection.  Wound culture is growing ESBL Klebsiella.  Her nasal swab for MRSA has been negative.  Blood cultures have been negative so far. She is being followed by Dr. Leone and her renal function is slowly improving and she may not need further dialysis.      Patient will be continued on physical and occupational therapy.  As per Dr. Rueda, she will need wound VAC placement for her right foot.  She is on heparin for DVT prophylaxis.  She is on hydralazine, carvedilol and spironolactone for hypertension.  She will need follow-up with Dr. Theodore for her retroperitoneal and mediastinal lymphadenopathy.  Hopefully, she should be able to go to short-term rehabilitation tomorrow.  Discussed with case management services.        Samson Reyes MD  01/21/19  1:35 PM    Dictated utilizing Dragon dictation.

## 2019-01-21 NOTE — PROGRESS NOTES
"Nephrology Progress Note.    LOS: 11 days    Patient Care Team:  Marianela Albright APRN as PCP - General (Family Medicine)  Geremias Shepherd MD as Consulting Physician (General Surgery)    Chief Complaint:  No chief complaint on file.      Subjective     Follow up for FREDDY and related issues.    Interval History:     Patient Complaints: none    Patient seen and examined this morning.  Events from last night noted.  She is again drowsy and sleepy today will not wake up or have any significant interaction.  She didn't even say she wants to go home today.  She does not have any meaningful interaction.    Review of Systems:    No meaningful review of system possible.      Objective     Vital Signs  BP (!) 182/80   Pulse 78   Temp 97.6 °F (36.4 °C) (Axillary)   Resp 18   Ht 165.1 cm (65\")   Wt (!) 137 kg (303 lb 1.6 oz)   SpO2 93%   BMI 50.44 kg/m²       I/O this shift:  In: 1000 [I.V.:1000]  Out: -     Intake/Output Summary (Last 24 hours) at 1/21/2019 0748  Last data filed at 1/21/2019 0738  Gross per 24 hour   Intake 2120 ml   Output --   Net 2120 ml       Physical Exam:    General Appearance:  no acute distress,   HEENT: Oral mucosa dry, extra occular movements intact. Sclera clear.  Skin: Warm and dry  Neck: supple, no JVD, trachea midline  Lungs:Chest shape is normal. Breath sounds heard bilaterally equally. No crackles, No wheezing.   Heart: regular rate and rhythm. normal S1 and S2, no S3, no rub, peripheral pulses weak but palpable.  Abdomen: Obese, soft, non-tender,  present bowel sounds to auscultation  : no palpable bladder.  Extremities: 1-2+ edema, no cyanosis or clubbing.   Neuro: She does have interaction and move her extremities randomly.     Results Review:    Results from last 7 days   Lab Units 01/21/19  0457 01/20/19  0556 01/19/19  0525   SODIUM mmol/L 150* 150* 147*   POTASSIUM mmol/L 3.5 3.7 3.3*   CHLORIDE mmol/L 119* 117* 114*   CO2 mmol/L 26.0 26.0 25.0*   BUN mg/dL 62* 66* 66* "   CREATININE mg/dL 1.80* 2.00* 1.80*   CALCIUM mg/dL 8.6 8.7 8.8   GLUCOSE mg/dL 159* 205* 128*       Estimated Creatinine Clearance: 46.7 mL/min (A) (by C-G formula based on SCr of 1.8 mg/dL (H)).    Results from last 7 days   Lab Units 01/21/19  0457 01/16/19  0537   MAGNESIUM mg/dL  --  2.5*   PHOSPHORUS mg/dL 3.6  --              Results from last 7 days   Lab Units 01/21/19  0457 01/19/19  0525 01/18/19  0515 01/17/19  0428 01/16/19  0537   WBC 10*3/mm3 6.80 7.84 7.61 11.00* 13.99*   HEMOGLOBIN g/dL 8.3* 9.0* 8.4* 8.2* 8.4*   PLATELETS 10*3/mm3 228 209 139 108* 79*               Imaging Results (last 24 hours)     ** No results found for the last 24 hours. **          carvedilol 6.25 mg Oral BID With Meals   famotidine 20 mg Intravenous Daily   heparin (porcine) 5,000 Units Subcutaneous Q12H   hydrALAZINE 25 mg Nasogastric Q8H   hydrocortisone sodium succinate 50 mg Intravenous Q12H   insulin aspart 0-7 Units Subcutaneous 4x Daily AC & at Bedtime   insulin detemir 30 Units Subcutaneous QAM   lactobacillus acidophilus 1 capsule Nasogastric Daily   levothyroxine 100 mcg Nasogastric Q AM   sodium chloride 3 mL Intravenous Q12H   spironolactone 25 mg Oral Daily       dextrose 125 mL/hr Last Rate: 125 mL/hr (01/21/19 0738)   sodium chloride 10 mL/hr Last Rate: 10 mL/hr (01/20/19 2216)       Medication Review:   Current Facility-Administered Medications   Medication Dose Route Frequency Provider Last Rate Last Dose   • acetaminophen (TYLENOL) tablet 650 mg  650 mg Oral Q4H PRN Milad Leone MD, FASN       • aluminum-magnesium hydroxide-simethicone (MAALOX MAX) 400-400-40 MG/5ML suspension 15 mL  15 mL Oral Q6H PRN Samson Reyes MD   15 mL at 01/19/19 0458   • carvedilol (COREG) tablet 6.25 mg  6.25 mg Oral BID With Meals Samson Reyes MD   6.25 mg at 01/20/19 1717   • CHAPSTICK 1 each  1 each Apply externally Q1H PRN Samson Reyes MD   1 each at 01/16/19 1155   • dextrose (D50W) 25 g/ 50mL Intravenous Solution 25 g   25 g Intravenous Q15 Min PRN Milad Leone MD, FASN       • dextrose (D5W) 5 % infusion  125 mL/hr Intravenous Continuous Milad Leone MD, GILA 125 mL/hr at 01/21/19 0738 125 mL/hr at 01/21/19 0738   • dextrose (GLUTOSE) oral gel 1 tube  1 tube Oral Q15 Min PRN Milad Leone MD, FASN       • famotidine (PEPCID) injection 20 mg  20 mg Intravenous Daily Liz Gregg MD   20 mg at 01/20/19 0853   • glucagon (human recombinant) (GLUCAGEN DIAGNOSTIC) injection 1 mg  1 mg Subcutaneous PRN Milad Leone MD, GILA       • glycerin 35 % liquid 35% 2 spray  2 spray Mouth/Throat Q2H PRN Maribel Wheeler DO   2 spray at 01/16/19 0857   • heparin (porcine) 5000 UNIT/ML injection 5,000 Units  5,000 Units Subcutaneous Q12H Samson Reyes MD   5,000 Units at 01/20/19 2129   • heparin (porcine) injection 2,400 Units  2,400 Units Intracatheter PRN Milad Leone MD, FASN   2,400 Units at 01/20/19 2055   • hydrALAZINE (APRESOLINE) injection 10 mg  10 mg Intravenous Q6H PRN Liz Gregg MD   10 mg at 01/20/19 0856   • hydrALAZINE (APRESOLINE) tablet 25 mg  25 mg Nasogastric Q8H Samson Reyes MD   25 mg at 01/21/19 0601   • hydrocortisone sodium succinate (Solu-CORTEF) injection 50 mg  50 mg Intravenous Q12H Liz Gregg MD   50 mg at 01/21/19 0128   • insulin aspart (novoLOG) injection 0-7 Units  0-7 Units Subcutaneous 4x Daily AC & at Bedtime Samson Reyes MD   2 Units at 01/21/19 0737   • insulin detemir (LEVEMIR) injection 30 Units  30 Units Subcutaneous QAM Samson Reyes MD   30 Units at 01/21/19 0737   • lactobacillus acidophilus (RISAQUAD) capsule 1 capsule  1 capsule Nasogastric Daily Samson Reyes MD   1 capsule at 01/20/19 0852   • levothyroxine (SYNTHROID, LEVOTHROID) tablet 100 mcg  100 mcg Nasogastric Q AM Samson Reyes MD   100 mcg at 01/21/19 0559   • LORazepam (ATIVAN) injection 0.5 mg  0.5 mg Intravenous Q6H PRN Samsno Reyes MD   0.5 mg at 01/20/19 1411   • naloxone (NARCAN) injection  0.4 mg  0.4 mg Intravenous Q5 Min PRN Milad Leone MD, FASN       • ondansetron (ZOFRAN) tablet 4 mg  4 mg Oral Q6H PRN Milad Leone MD, FASN        Or   • ondansetron ODT (ZOFRAN-ODT) disintegrating tablet 4 mg  4 mg Oral Q6H PRN Milad Leone MD, FASN        Or   • ondansetron (ZOFRAN) injection 4 mg  4 mg Intravenous Q6H PRN Milad Leone MD, FASN   4 mg at 01/19/19 1659   • promethazine (PHENERGAN) injection 12.5 mg  12.5 mg Intravenous Q6H PRN Samson Reyes MD   12.5 mg at 01/19/19 2041   • sodium chloride 0.9 % flush 1-10 mL  1-10 mL Intravenous PRN Milad Leone MD, FASN   10 mL at 01/20/19 0053   • sodium chloride 0.9 % flush 3 mL  3 mL Intravenous Q12H Milad Leone MD, FASN   3 mL at 01/20/19 2130   • Sodium chloride 0.9 % infusion  10 mL/hr Intravenous Continuous Milad Leone MD, FASN 10 mL/hr at 01/20/19 2216 10 mL/hr at 01/20/19 2216   • spironolactone (ALDACTONE) tablet 25 mg  25 mg Oral Daily Milad Leone MD, FASN   25 mg at 01/20/19 0853   • zinc oxide 13 % cream   Topical BID PRN Milad Leone MD, FASN           Assessment/Plan         1.   Acute on chronic renal failure (CMS/HCC): It is likely secondary to hypotensive episodes as well as the use of Bactrim.    2.   Cellulitis of both lower extremities: Continue with the IV antibiotics.  She likely has an abscess on the foot that needs to be addressed  3.   Altered mental status: There is some improvement in her mental status.  4.   Anemia: Transfused as needed  5.   Bilateral edema of lower extremity:  she needs a central line placed the temporary dialysis catheter and use it as a central line and likely will need dialysis in the morning.  6.   GERD (gastroesophageal reflux disease)  7.   Hyperkalemia:  it is resolved at this point  8.   Hypertension: Under fair control  9.   Hypothermia  10.   Hypothyroidism  11.   Type 2 diabetes mellitus with complication (CMS/HCC)  12.   Vitamin B12 deficiency    Plan:    · She has good  urine output, no dialysis needed today.  Dialysis access can be removed once she does not need the central line access.  · Hypernatremia noted no significant improvement will adjust the D5 for the next 24 hours likely will improve with that.  · Low potassium and increased sodium noted will go ahead and start her on Aldactone 25 mg daily and see how she'll respond to it.  · Have to reassess her volume status and likely will need some form of diuretics.  Hold off any loop diuretics at this point.  · She has already received 1 dose of oral potassium today.  · Blood pressure is fairly stable with the hydralazine can be continued.  · Clinically starting to improve.  · Details were also discussed with the hospitalist service.    · Surveillance labs.  · Further recommendations will depend on clinical course of the patient during the current hospitalization.    · I also discussed the details with the nursing staff.  · Rest as ordered.    Milad Leone MD, FASN  01/21/19  7:48 AM    Dictated utilizing Dragon dictation.

## 2019-01-21 NOTE — DISCHARGE PLACEMENT REQUEST
"  Update    Tatiana Mckeon (58 y.o. Female)     Date of Birth Social Security Number Address Home Phone MRN    1960  [address] [place] [address] [phone] [id number]    Sikh Marital Status          Unknown Single       Admission Date Admission Type Admitting Provider Attending Provider Department, Room/Bed    1/10/19 Urgent Milad Leone MD, Samson Ureña MD [facility] SURG  4, 403/1    Discharge Date Discharge Disposition Discharge Destination                       Attending Provider:  Samson Reyes MD    Allergies:  Metformin And Related    Isolation:  Contact   Infection:  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)   Code Status:  CPR    Ht:  165.1 cm (65\")   Wt:  137 kg (303 lb 1.6 oz)    Admission Cmt:  None   Principal Problem:  Cellulitis of both lower extremities [L03.115,L03.116]                 Active Insurance as of 1/10/2019     Primary Coverage     Payor Plan Insurance Group Employer/Plan Group    MEDICARE MEDICARE A & B      Payor Plan Address Payor Plan Phone Number Payor Plan Fax Number Effective Dates    [address] [phone]  12/1/1998 - None Entered    [facility] [address]       Subscriber Name Subscriber Birth Date Member ID       TATIANA MCKEON 1960 [id number]           Secondary Coverage     Payor Plan Insurance Group Employer/Plan Group    [place]      Payor Plan Address Payor Plan Phone Number Payor Plan Fax Number Effective Dates    [address] [phone]  10/3/2018 - None Entered    [place] [address]       Subscriber Name Subscriber Birth Date Member ID       TATIANA MCKEON 1960 [id number]                 Emergency Contacts      (Rel.) Home Phone Work Phone Mobile Phone    Lovely Mckeon (Power of ) [phone] -- --    SUNDAY CHACON (Other) -- -- [phone]    Adolfo Mckeon (Brother) [phone] -- --            Insurance Information                MEDICARE/MEDICARE A & B " Phone: [phone]    Subscriber: Millicent Mckeon Subscriber#: [id number]    Group#:  Precert#:         [place] Phone: [phone]    Subscriber: Millicent Mckeon Subscriber#: [id number]    Group#:  Precert#:           Operative/Procedure Notes (last 24 hours) (Notes from 2019  9:37 AM through 2019  9:37 AM)     No notes of this type exist for this encounter.           Physician Progress Notes (last 24 hours) (Notes from 2019  9:37 AM through 2019  9:37 AM)      Samson Reyes MD at 2019  1:55 PM                [facility]    [facility]ESS NOTE    Name:  Millicent Mckeon   Age:  58 y.o.  Sex:  female  :  1960  MRN:  [id number]   Visit Number:  [id number]  Admission Date:  1/10/2019  Date Of Service:  19  Primary Care Physician:  Marianela Albright APRN     LOS: 10 days :  Patient Care Team:  Marianela Albright APRN as PCP - General (Family Medicine)  Geremias Shepherd MD as Consulting Physician (General Surgery):    Chief Complaint:      Generalized weakness.    Subjective / Interval History:     Ms. Mckeon is currently lying down on the bed and is comfortable at rest.  She is answering questions but at times does get confused.  She refused to work with physical therapy yesterday. She was transferred out of ICU on 2019.  She was seen by speech therapy and they have recommended puréed diet with nectar thick liquids.  She has not had any dialysis since last 3 days.  Her Wilkinson catheter was discontinued on 2019.  No significant overnight events.    She was transferred from [facility] room on 1/10/2019 with symptoms of generalized weakness, altered mental status, shortness of breath and leg cellulitis.  She was noted to have hypothermia and hypotension and was placed on warming blanket on presentation.  She was placed on broad-spectrum IV antibiotic therapy with Zosyn and vancomycin and a consultation was  obtained from Dr. Rueda from podiatry.  She was also noted to have acute renal failure and has been followed by Dr. Leone.  According to the sister Virginia, patient was in her normal state of health about 2 weeks ago since when she started having generalized weakness and worsening leg swelling.  She has morbid obesity and apparently uses a wheelchair.  Patient's recent CT scan of the abdomen and pelvis done at [facility] emergency room noted lymphadenopathy especially in the retroperitoneal and bilateral inguinal regions as well as paratracheal and retrocrural. Our initial CT scan reports done here did not mention these findings but I discussed with our radiologist on 1/14/2019 and he reviewed the scans done here and he stated that he does see the lymph nodes in the above-mentioned areas.  He also stated that the patient has a tiny nodule on the left lobe of the thyroid but he did not think it was 2 cm in size.  She was seen by Dr. Theodore from oncology on 1/16/2019.  He recommended outpatient follow-up for her lymphadenopathy and possible outpatient lymph node biopsy.     Patient did undergo right foot surgical debridement by Dr. Rueda on 1/14/2019.  Patient was also started on temporary hemodialysis on 1/15/2019.  After foot surgery, patient started improving slowly with regards to her mental status.  Patient was on tube feeds which he has tolerated well.  Her NG tube was discontinued on 1/17/2019 and she has been placed on puréed diet with nectar thick liquids.  She was transferred out of the ICU on 1/17/2019.  Dr. Rueda as advised wound VAC placement on Monday.    Review of Systems:     General ROS: Patient denies any fevers, chills or loss of consciousness.  Complains of generalized weakness.  Respiratory ROS: Complains of cough and chest congestion.  Cardiovascular ROS: Denies chest pain or palpitations. No history of exertional chest pain.  Gastrointestinal ROS: Complains of nausea and  heartburn.  Neurological ROS: Denies any focal weakness. No loss of consciousness. Denies any numbness.    Vital Signs:    Temp:  [98.1 °F (36.7 °C)-98.4 °F (36.9 °C)] 98.1 °F (36.7 °C)  Heart Rate:  [76-85] 76  Resp:  [16-18] 18  BP: (154-184)/(57-82) 154/57    Intake and output:    I/O last 3 completed shifts:  In: 10 [P.O.:10]  Out: -   I/O this shift:  In: 120 [P.O.:120]  Out: -     Physical Examination:    General Appearance:  Alert and oriented to person, not in any acute distress.   Head:  Atraumatic and normocephalic, without obvious abnormality.   Eyes:          PERRLA, conjunctivae and sclerae normal, no Icterus. No pallor.  Extraocular movements are within normal limits.     Neck: Supple, short neck, trachea midline, no thyromegaly, no carotid bruit.   Lungs:   Chest shape is normal. Breath sounds decreased bilaterally due to thick chest wall.  No wheezing.  Bilateral scattered crackles heard. No Pleural rub or bronchial breathing.  Right subclavian central line is in place.   Heart:  Normal S1 and S2, no murmur, no gallop, no rub. No JVD   Abdomen:   Normal bowel sounds, no masses, no organomegaly. Soft, non-tender, obese with a large pannus with healing ulcers noted on the skin.   Extremities: Large lower extremities due to obesity with multiple skin folds that are indurated and hyperemic.  Superficial skin ulcer noted on the lateral aspect of the right leg without any purulent discharge.  Surgical bandage is noted on the right foot.  Wound noted on the medial aspect of the right thigh with purulent slough.  Excoriations noted on bilateral legs and feet.     Skin: As described above.  Please see nurse's notes and the photographs in the chart for further details.  Lower extremity redness has significantly improved in the last several days.     Neurologic: Alert and oriented to place and person.  Moves all 4 limbs equally but does have significant generalized weakness.       Laboratory results:    Results  from last 7 days   Lab Units 01/20/19  0556 01/19/19  0525 01/18/19  0515  01/14/19  0414   SODIUM mmol/L 150* 147* 143   < > 138   POTASSIUM mmol/L 3.7 3.3* 3.4*   < > 4.2   CHLORIDE mmol/L 117* 114* 112*   < > 109*   CO2 mmol/L 26.0 25.0* 25.0*   < > 19.0*   BUN mg/dL 66* 66* 65*   < > 82*   CREATININE mg/dL 2.00* 1.80* 1.90*   < > 4.00*   CALCIUM mg/dL 8.7 8.8 8.5   < > 7.9*   BILIRUBIN mg/dL  --   --   --   --  0.4   ALK PHOS U/L  --   --   --   --  272*   ALT (SGPT) U/L  --   --   --   --  27   AST (SGOT) U/L  --   --   --   --  16   GLUCOSE mg/dL 205* 128* 156*   < > 181*    < > = values in this interval not displayed.     Results from last 7 days   Lab Units 01/19/19  0525 01/18/19  0515 01/17/19  0428   WBC 10*3/mm3 7.84 7.61 11.00*   HEMOGLOBIN g/dL 9.0* 8.4* 8.2*   HEMATOCRIT % 30.1* 27.6* 26.8*   PLATELETS 10*3/mm3 209 139 108*             Results from last 7 days   Lab Units 01/14/19  1354   WOUNDCX  Light growth (2+) Proteus vulgaris*  Klebsiella pneumoniae ESBL*     I have reviewed the patient's laboratory results.    Radiology results:    Imaging Results (last 24 hours)     ** No results found for the last 24 hours. **        Medication Review:     I have reviewed the patients active and prn medications.       Cellulitis of both lower extremities    Anemia    Bilateral edema of lower extremity    Cellulitis of lower extremity    Acute on chronic renal failure (CMS/HCC)    GERD (gastroesophageal reflux disease)    Hyperkalemia    Essential hypertension    Hypothermia    Hypothyroidism    Type 2 diabetes mellitus with complication (CMS/[facility])    Vitamin B12 deficiency    Acute metabolic encephalopathy    Assessment:    1.  Sepsis with hypothermia, present on admission, improving.  2.  Bilateral lower extremity cellulitis with ESBL Klebsiella, present on admission, improving.  3.  Acute metabolic encephalopathy, present on admission, improving.  4.  Acute renal failure, present on admission.  5.   Hyperkalemia, present on admission, improved.  6.  Acquired hypothyroidism, uncontrolled.  7.  Diabetes mellitus type 2 with nephropathy.  8.  Morbid obesity with a BMI of 63.  9.  Chronic venous insufficiency of the lower extremities.  10. Retrocrural, paratracheal, retro-peritoneal, bilateral inguinal lymphadenopathy, uncertain etiology.  11.  Left thyroid nodule 2 cm on recent CT scan.  12.  Thrombocytopenia likely secondary to sepsis, improving.    Plan:    Ms. Mckeon continues to improve but still has significant generalized weakness.  Unfortunately, she has not been able to get out of bed since admission.  Her sepsis has improved.  She is currently on Invanz for her right ulcer and infection.  Wound culture is growing ESBL Klebsiella.  Her nasal swab for MRSA has been negative.  Blood cultures have been negative so far. She is being followed by Dr. Leone and her renal function is slowly improving and she may not need further dialysis.      Patient will be continued on physical and occupational therapy.  As per Dr. Rueda, she will need wound VAC placement for her right foot tomorrow.  She will need short-term rehabilitation at the time of discharge.  She is on heparin for DVT prophylaxis.  She does have uncontrolled blood pressures.  She has been started on spironolactone yesterday.  She is already on hydralazine and I will place her on carvedilol as well.  She will need follow-up with Dr. Theodore for her retroperitoneal and mediastinal lymphadenopathy.        Samson Reyes MD  01/20/19  1:55 PM    Dictated utilizing Dragon dictation.      Electronically signed by Samson Reyes MD at 1/20/2019  2:32 PM     Milad Leone MD, FASN at 1/20/2019 10:28 AM          Nephrology Progress Note.    LOS: 10 days    Patient Care Team:  Marianela Albright APRN as PCP - General (Family Medicine)  Geremias Shepherd MD as Consulting Physician (General Surgery)    Chief Complaint:  No chief complaint on file.      Subjective  "    Follow up for FREDDY and related issues.    Interval History:     Patient Complaints: none    Patient seen and examined this morning.  Events from last night noted.  She is again drowsy and sleepy today will not wake up or have any significant interaction.  She didn't even say she wants to go home today.  Not much interactive.    Review of Systems:    No meaningful review of system possible.      Objective     Vital Signs  BP (!) 184/82 (BP Location: Left arm, Patient Position: Lying)   Pulse 85   Temp 98.1 °F (36.7 °C) (Oral)   Resp 18   Ht 165.1 cm (65\")   Wt (!) 147 kg (324 lb 4.8 oz)   SpO2 94%   BMI 53.97 kg/m²        I/O this shift:  In: 120 [P.O.:120]  Out: -     Intake/Output Summary (Last 24 hours) at 1/20/2019 1028  Last data filed at 1/20/2019 0834  Gross per 24 hour   Intake 130 ml   Output --   Net 130 ml       Physical Exam:    General Appearance:  no acute distress,   HEENT: Oral mucosa dry, extra occular movements intact. Sclera clear.  Skin: Warm and dry  Neck: supple, no JVD, trachea midline  Lungs:Chest shape is normal. Breath sounds heard bilaterally equally. No crackles, No wheezing.   Heart: regular rate and rhythm. normal S1 and S2, no S3, no rub, peripheral pulses weak but palpable.  Abdomen: Obese, soft, non-tender,  present bowel sounds to auscultation  : no palpable bladder.  Extremities: 1-2+ edema, no cyanosis or clubbing.   Neuro: She does have interaction and move her extremities randomly.     Results Review:    Results from last 7 days   Lab Units 01/20/19  0556 01/19/19  0525 01/18/19  0515  01/14/19  0414   SODIUM mmol/L 150* 147* 143   < > 138   POTASSIUM mmol/L 3.7 3.3* 3.4*   < > 4.2   CHLORIDE mmol/L 117* 114* 112*   < > 109*   CO2 mmol/L 26.0 25.0* 25.0*   < > 19.0*   BUN mg/dL 66* 66* 65*   < > 82*   CREATININE mg/dL 2.00* 1.80* 1.90*   < > 4.00*   CALCIUM mg/dL 8.7 8.8 8.5   < > 7.9*   BILIRUBIN mg/dL  --   --   --   --  0.4   ALK PHOS U/L  --   --   --   --  272* "   ALT (SGPT) U/L  --   --   --   --  27   AST (SGOT) U/L  --   --   --   --  16   GLUCOSE mg/dL 205* 128* 156*   < > 181*    < > = values in this interval not displayed.       Estimated Creatinine Clearance: 43.9 mL/min (A) (by C-G formula based on SCr of 2 mg/dL (H)).    Results from last 7 days   Lab Units 01/16/19  0537   MAGNESIUM mg/dL 2.5*             Results from last 7 days   Lab Units 01/19/19  0525 01/18/19  0515 01/17/19  0428 01/16/19  0537 01/15/19  0418   WBC 10*3/mm3 7.84 7.61 11.00* 13.99* 19.82*   HEMOGLOBIN g/dL 9.0* 8.4* 8.2* 8.4* 8.1*   PLATELETS 10*3/mm3 209 139 108* 79* 83*               Imaging Results (last 24 hours)     ** No results found for the last 24 hours. **          carvedilol 6.25 mg Oral BID With Meals   ertapenem 1 g Intravenous Q24H   famotidine 20 mg Intravenous Daily   heparin (porcine) 5,000 Units Subcutaneous Q12H   hydrALAZINE 25 mg Nasogastric Q8H   hydrocortisone sodium succinate 50 mg Intravenous Q12H   insulin aspart 0-7 Units Subcutaneous 4x Daily AC & at Bedtime   insulin detemir 30 Units Subcutaneous QAM   lactobacillus acidophilus 1 capsule Nasogastric Daily   levothyroxine 100 mcg Nasogastric Q AM   sodium chloride 3 mL Intravenous Q12H   spironolactone 25 mg Oral Daily       sodium chloride 10 mL/hr Last Rate: 10 mL/hr (01/18/19 0054)       Medication Review:   Current Facility-Administered Medications   Medication Dose Route Frequency Provider Last Rate Last Dose   • acetaminophen (TYLENOL) tablet 650 mg  650 mg Oral Q4H PRN Milad Leone MD, FASN       • aluminum-magnesium hydroxide-simethicone (MAALOX MAX) 400-400-40 MG/5ML suspension 15 mL  15 mL Oral Q6H PRN Samson Reyes MD   15 mL at 01/19/19 0458   • carvedilol (COREG) tablet 6.25 mg  6.25 mg Oral BID With Meals Samson Reyes MD   6.25 mg at 01/20/19 0856   • CHAPSTICK 1 each  1 each Apply externally Q1H PRN Samson Reyes MD   1 each at 01/16/19 1155   • dextrose (D50W) 25 g/ 50mL Intravenous Solution 25  g  25 g Intravenous Q15 Min PRN Milad Leone MD, GILA       • dextrose (GLUTOSE) oral gel 1 tube  1 tube Oral Q15 Min PRN Milad Leone MD, FASN       • ertapenem (INVanz) 1 g in sodium chloride 0.9 % 100 mL IVPB-MBP  1 g Intravenous Q24H Milad Leone MD, FASN 200 mL/hr at 01/19/19 1343 1 g at 01/19/19 1343   • famotidine (PEPCID) injection 20 mg  20 mg Intravenous Daily Liz Gregg MD   20 mg at 01/20/19 0853   • glucagon (human recombinant) (GLUCAGEN DIAGNOSTIC) injection 1 mg  1 mg Subcutaneous PRN Milad Leone MD, GILA       • glycerin 35 % liquid 35% 2 spray  2 spray Mouth/Throat Q2H PRN Maribel Wheeler DO   2 spray at 01/16/19 0857   • heparin (porcine) 5000 UNIT/ML injection 5,000 Units  5,000 Units Subcutaneous Q12H Samson Reyes MD   5,000 Units at 01/20/19 0853   • heparin (porcine) injection 2,400 Units  2,400 Units Intracatheter PRN Milad Leone MD, FASN   2,400 Units at 01/15/19 1502   • hydrALAZINE (APRESOLINE) injection 10 mg  10 mg Intravenous Q6H PRN Liz Gregg MD   10 mg at 01/20/19 0856   • hydrALAZINE (APRESOLINE) tablet 25 mg  25 mg Nasogastric Q8H Samson Reyes MD   25 mg at 01/20/19 0639   • hydrocortisone sodium succinate (Solu-CORTEF) injection 50 mg  50 mg Intravenous Q12H Liz Gregg MD   50 mg at 01/20/19 0053   • insulin aspart (novoLOG) injection 0-7 Units  0-7 Units Subcutaneous 4x Daily AC & at Bedtime Samson Reyes MD   2 Units at 01/20/19 0639   • insulin detemir (LEVEMIR) injection 30 Units  30 Units Subcutaneous QAM Samson Reyes MD   30 Units at 01/20/19 0639   • lactobacillus acidophilus (RISAQUAD) capsule 1 capsule  1 capsule Nasogastric Daily Samson Reyes MD   1 capsule at 01/20/19 0855   • levothyroxine (SYNTHROID, LEVOTHROID) tablet 100 mcg  100 mcg Nasogastric Q AM Samson Reyes MD   100 mcg at 01/20/19 0639   • LORazepam (ATIVAN) injection 0.5 mg  0.5 mg Intravenous Q6H PRN Samson Reyes MD   0.5 mg at 01/20/19 0305   • Morphine  sulfate (PF) injection 2 mg  2 mg Intravenous Q4H PRN Milad Leone MD, FASN        And   • naloxone (NARCAN) injection 0.4 mg  0.4 mg Intravenous Q5 Min PRN Milad Leone MD, FASN       • ondansetron (ZOFRAN) tablet 4 mg  4 mg Oral Q6H PRN Milad Leone MD, FASN        Or   • ondansetron ODT (ZOFRAN-ODT) disintegrating tablet 4 mg  4 mg Oral Q6H PRN Milad Leone MD, FASN        Or   • ondansetron (ZOFRAN) injection 4 mg  4 mg Intravenous Q6H PRN Milad Leone MD, FASN   4 mg at 01/19/19 1659   • promethazine (PHENERGAN) injection 12.5 mg  12.5 mg Intravenous Q6H PRN Samson Reyes MD   12.5 mg at 01/19/19 2041   • sodium chloride 0.9 % flush 1-10 mL  1-10 mL Intravenous PRN Milad Leone MD, FASN   10 mL at 01/20/19 0053   • sodium chloride 0.9 % flush 3 mL  3 mL Intravenous Q12H Milad Leone MD, FASN   3 mL at 01/20/19 0853   • Sodium chloride 0.9 % infusion  10 mL/hr Intravenous Continuous Milad Leone MD, FASN 10 mL/hr at 01/18/19 0054 10 mL/hr at 01/18/19 0054   • spironolactone (ALDACTONE) tablet 25 mg  25 mg Oral Daily Milad Leone MD, FASN   25 mg at 01/20/19 0853   • zinc oxide 13 % cream   Topical BID PRN Milad Leone MD, FASN           Assessment/Plan         1.   Acute on chronic renal failure (CMS/HCC): It is likely secondary to hypotensive episodes as well as the use of Bactrim.    2.   Cellulitis of both lower extremities: Continue with the IV antibiotics.  She likely has an abscess on the foot that needs to be addressed  3.   Altered mental status: There is some improvement in her mental status.  4.   Anemia: Transfused as needed  5.   Bilateral edema of lower extremity:  she needs a central line placed the temporary dialysis catheter and use it as a central line and likely will need dialysis in the morning.  6.   GERD (gastroesophageal reflux disease)  7.   Hyperkalemia:  it is resolved at this point  8.   Hypertension: Under fair control  9.   Hypothermia  10.    Hypothyroidism  11.   Type 2 diabetes mellitus with complication (CMS/HCC)  12.   Vitamin B12 deficiency    Plan:    · Worsening renal function noted.  · Increased sodium as well.  She's not eating or drinking much.  She does not have NG tube.  · Potassium is better.  Continue with the Aldactone.  · Have to reassess her volume status and likely will need some form of diuretics.  Hold off any loop diuretics at this point.  · She'll be started on D5W and reassess serum sodium on day-to-day basis.  · Blood pressure is fairly stable with the hydralazine can be continued.  · Details were also discussed with the hospitalist service.    · Surveillance labs.  · Further recommendations will depend on clinical course of the patient during the current hospitalization.    · I also discussed the details with the nursing staff.  · Rest as ordered.    Milad Leone MD, FASN  01/20/19  10:28 AM    Dictated utilizing Dragon dictation.    Electronically signed by Milad Leone MD, FASN at 1/20/2019  8:13 PM       Consult Notes (last 24 hours) (Notes from 1/20/2019  9:37 AM through 1/21/2019  9:37 AM)     No notes of this type exist for this encounter.           Physical Therapy Notes (last 24 hours) (Notes from 1/20/2019  9:37 AM through 1/21/2019  9:37 AM)      Nguyen Chicas PTA at 1/20/2019  6:58 PM  Version 1 of 1 01/20/19 1857   PT Deferred Reason   PT Deferred Reason Patient/family declined treatment  (Pt refused stating I don't want to do it )       Electronically signed by Nguyen Chicas PTA at 1/20/2019  6:58 PM       Occupational Therapy Notes (last 24 hours) (Notes from 1/20/2019  9:37 AM through 1/21/2019  9:37 AM)     No notes of this type exist for this encounter.

## 2019-01-21 NOTE — PLAN OF CARE
Problem: Patient Care Overview  Goal: Plan of Care Review  Outcome: Ongoing (interventions implemented as appropriate)   01/21/19 0506   Coping/Psychosocial   Plan of Care Reviewed With patient   Plan of Care Review   Progress no change   OTHER   Outcome Summary Patient is confused. Wants to go home . vitals stable. will continuel to monitor.      Goal: Interprofessional Rounds/Family Conf  Outcome: Ongoing (interventions implemented as appropriate)   01/21/19 0506   Interdisciplinary Rounds/Family Conf   Participants family;pharmacy;patient;physician;baljinder   01/21/19 0506   Interdisciplinary Rounds/Family Conf   Participants family;pharmacy;patient;physician;nursing;physical therapy;occupational therapy   sing       Problem: Fall Risk (Adult)  Goal: Absence of Fall  Outcome: Ongoing (interventions implemented as appropriate)   01/21/19 0506   Fall Risk (Adult)   Absence of Fall making progress toward outcome       Problem: Skin Injury Risk (Adult)  Goal: Skin Health and Integrity  Outcome: Ongoing (interventions implemented as appropriate)   01/21/19 0506   Skin Injury Risk (Adult)   Skin Health and Integrity making progress toward outcome       Problem: Skin and Soft Tissue Infection (Adult)  Goal: Signs and Symptoms of Listed Potential Problems Will be Absent, Minimized or Managed (Skin and Soft Tissue Infection)  Outcome: Ongoing (interventions implemented as appropriate)   01/21/19 0506   Goal/Outcome Evaluation   Problems Assessed (Skin and Soft Tissue Infection) all   Problems Present (Skin/Soft Tissue Inf) infection progression;situational response       Problem: Wound (Includes Pressure Injury) (Adult)  Goal: Signs and Symptoms of Listed Potential Problems Will be Absent, Minimized or Managed (Wound)  Outcome: Ongoing (interventions implemented as appropriate)   01/21/19 0506   Goal/Outcome Evaluation   Problems Present (Wound) delayed wound healing

## 2019-01-21 NOTE — PLAN OF CARE
Problem: Patient Care Overview  Goal: Plan of Care Review  Outcome: Ongoing (interventions implemented as appropriate)   01/21/19 3027   Coping/Psychosocial   Plan of Care Reviewed With patient   Plan of Care Review   Progress improving   OTHER   Outcome Summary WOUND VAC: Old dressing removed . Wound bed irrigated with normal saline. Skin prep x 2 layers around periwound area. Wound bed pink. Dressing reapplied as in previous treatment. MOBILITY: Pt was able to perform bed mobility with decreased assistance and a lateral scoot transfer fron bed to chair. Pt became very fatigued at midpoint of transfer and NASR was called in to assist with remaining portion of transfer. Pt required max a for transfer for first half and max a x2 for second. See flowsheet for details.

## 2019-01-21 NOTE — THERAPY TREATMENT NOTE
Acute Care - Occupational Therapy Treatment Note  [facility]     Patient Name: Millicent Mckeon  : 1960  MRN: [id number]  Today's Date: 2019  Onset of Illness/Injury or Date of Surgery: 19  Date of Referral to OT: 19  Referring Physician: Mohit    Admit Date: 1/10/2019       ICD-10-CM ICD-9-CM   1. Altered mental status, unspecified altered mental status type R41.82 780.97   2. Cellulitis of both lower extremities L03.115 682.6    L03.116    3. Impaired functional mobility, balance, gait, and endurance Z74.09 V49.89   4. Impaired mobility and ADLs Z74.09 799.89   5. Dysphagia, oropharyngeal phase R13.12 787.22     Patient Active Problem List   Diagnosis   • Altered mental status   • Anemia   • Bilateral edema of lower extremity   • Cellulitis of lower extremity   • Acute on chronic renal failure (CMS/HCC)   • GERD (gastroesophageal reflux disease)   • Hyperkalemia   • Essential hypertension   • Hypothermia   • Hypothyroidism   • Type 2 diabetes mellitus with complication (CMS/HCC)   • Vitamin B12 deficiency   • Cellulitis of both lower extremities   • Acute metabolic encephalopathy     Past Medical History:   Diagnosis Date   • Diabetes mellitus (CMS/HCC)    • Disease of thyroid gland    • GERD (gastroesophageal reflux disease)    • Hypertension      Past Surgical History:   Procedure Laterality Date   • EYE SURGERY     • INCISION AND DRAINAGE LEG Right 2019    Procedure: Right heel incision and drainage with graft application;  Surgeon: Ar Rueda DPM;  Location: [facility];  Service: Podiatry   • LEG SURGERY         Therapy Treatment    Rehabilitation Treatment Summary     Row Name 19 1315 19 0912          Treatment Time/Intention    Discipline  occupational therapist  -TL  physical therapy assistant  -RM     Document Type  therapy note (daily note)  -TL  therapy note (daily note)  -RM     Subjective Information  no complaints  -TL  complains of;fatigue;weakness  -RM      Mode of Treatment  individual therapy  -TL  physical therapy  -RM     Patient/Family Observations  Put found up in chair and motivated to work with therapy in order to go home  -TL  --     Care Plan Review  care plan/treatment goals reviewed  -TL  care plan/treatment goals reviewed  -RM     Patient Effort  --  fair  -RM     Treatment Considerations/Comments  --  weakness   -RM     Recorded by [TL] Jacqueline Uribe, OTR 01/21/19 1545 [RM] Davion Arias, [facility] 01/21/19 1346     Row Name 01/21/19 1315             Vital Signs    Pre SpO2 (%)  97  -TL      O2 Delivery Pre Treatment  room air  -TL      Post SpO2 (%)  95  -TL      O2 Delivery Post Treatment  room air  -TL      Recorded by [TL] Jacqueline Uribe, OTR 01/21/19 1545      Row Name 01/21/19 1315             Cognitive Assessment/Intervention- PT/OT    Orientation Status (Cognition)  oriented x 4  -TL      Recorded by [TL] Jacqueline Uribe, OTR 01/21/19 1545      Row Name 01/21/19 0912             Safety Issues, Functional Mobility    Safety Issues Affecting Function (Mobility)  safety precautions follow-through/compliance;sequencing abilities  -RM      Impairments Affecting Function (Mobility)  endurance/activity tolerance;strength  -RM      Recorded by [RM] Davion Arias, [facility] 01/21/19 1346      Row Name 01/21/19 0912             Mobility Assessment/Intervention    Right Lower Extremity (Weight-bearing Status)  non weight-bearing (NWB)  -RM      Recorded by [RM] Davion Arias, [facility] 01/21/19 1346      Row Name 01/21/19 0912             Bed Mobility Assessment/Treatment    Supine-Sit [place] (Bed Mobility)  moderate assist (50% patient effort)  -RM      Recorded by [RM] Davion Arias, PTA 01/21/19 1346      Row Name 01/21/19 0912             Transfer Assessment/Treatment    Transfer Assessment/Treatment  bed-chair transfer  -RM      Recorded by [RM] Davion Arias, [facility] 01/21/19 1346      Row Name 01/21/19 0912             Bed-Chair Transfer    Bed-Chair  [place] (Transfers)  maximum assist (25% patient effort) lateral scoot   -RM      Recorded by [RM] Davion Arias, PTA 01/21/19 1346      Row Name 01/21/19 1315             Therapeutic Exercise    Upper Extremity (Therapeutic Exercise)  bicep curl, bilateral;tricep extension, bilateral;other (see comments) shoulder flexion/extension, shoulder ab/adduction  -TL      Weight/Resistance (Therapeutic Exercise)  yellow  -TL      Exercise Type (Therapeutic Exercise)  resistive exercises  -TL      Position (Therapeutic Exercise)  seated  -TL      Sets/Reps (Therapeutic Exercise)  3 X 10 reps  -TL      Equipment (Therapeutic Exercise)  resistive bands  -TL      Expected Outcome (Therapeutic Exercise)  improve functional tolerance, self-care activity  -TL      Recorded by [TL] Jacqueline Uribe R 01/21/19 1545      Row Name 01/21/19 1315 01/21/19 0912          Positioning and Restraints    Pre-Treatment Position  sitting in chair/recliner  -TL  in bed  -RM     Post Treatment Position  chair  -TL  chair  -RM     In Chair  reclined;call light within reach;encouraged to call for assist;legs elevated  -TL  reclined;call light within reach;encouraged to call for assist;waffle boot/both;notified nsg  -RM     Recorded by [TL] Jacqueline Uribe, OTR 01/21/19 1545 [RM] Davion Arias, PTA 01/21/19 1346     Row Name 01/21/19 1315 01/21/19 0912          Pain Scale: Numbers Pre/Post-Treatment    Pain Scale: Numbers, Pretreatment  0/10 - no pain  -TL  0/10 - no pain  -RM     Pain Scale: Numbers, Post-Treatment  0/10 - no pain  -TL  0/10 - no pain  -RM     Recorded by [TL] Jacqueline Uribe, OTR 01/21/19 1545 [RM] Davion Arias, PTA 01/21/19 1346     Row Name                Wound 01/10/19 1925 Right foot    Wound - Properties Group Date first assessed: 01/10/19 [CH] Time first assessed: 1925 [CH] Side: Right [CH] Location: foot [CH] Stage, Pressure Injury: deep tissue injury;unstageable [CH] Additional Comments: right heel. [CH2] Recorded by:   [CH] Jacqueline Munroe RN 01/11/19 0215 [CH2] Jacqueline Munroe RN 01/11/19 0225    Row Name                Wound 01/10/19 1925 Right lower;lateral leg abrasion    Wound - Properties Group Date first assessed: 01/10/19 [CH] Time first assessed: 1925 [CH] Side: Right [CH] Orientation: lower;lateral [CH] Location: leg [CH] Type: abrasion [CH] Stage, Pressure Injury: Stage 1 [CH] Recorded by:  [CH] Jacqueline Munroe RN 01/11/19 0217    Row Name                Wound 01/10/19 1925 Left anterior;lower;proximal leg unspecified    Wound - Properties Group Date first assessed: 01/10/19 [CH] Time first assessed: 1925 [CH] Side: Left [CH] Orientation: anterior;lower;proximal [CH] Location: leg [CH] Type: unspecified [CH] Recorded by:  [CH] Jacqueline Munroe RN 01/11/19 0221    Row Name                Wound 01/10/19 1925 Left gluteal abrasion    Wound - Properties Group Date first assessed: 01/10/19 [CH] Time first assessed: 1925 [CH] Present On Admission : yes;picture taken [CH] Side: Left [CH] Location: gluteal [CH] Type: abrasion [CH] Recorded by:  [CH] Jacqueline Munroe RN 01/11/19 0224    Row Name                Wound 01/10/19 1925 Right posterior thigh abrasion    Wound - Properties Group Date first assessed: 01/10/19 [CH] Time first assessed: 1925 [CH] Present On Admission : yes;picture taken [CH] Side: Right [CH] Orientation: posterior [CH] Location: thigh [CH] Type: abrasion [CH] Recorded by:  [CH] Jacqueline Munroe RN 01/11/19 0230    Row Name                Wound 01/10/19 1925    Wound - Properties Group Date first assessed: 01/10/19 [CH] Time first assessed: 1925 [CH] Present On Admission : yes;picture taken [CH] Recorded by:  [CH] Jacqueline Munroe RN 01/11/19 0302    Row Name                Wound 01/10/19 1925 abdomen    Wound - Properties Group Date first assessed: 01/10/19 [CH] Time first assessed: 1925 [CH] Present On Admission : yes;picture taken [CH] Location: abdomen [CH] Recorded by:  [CH] Jacqueline Munroe, JANAE 01/11/19  0323    Row Name                Wound 01/20/19 0300 Left lower;lateral chest MASD (moisture associated skin damage);skin tear    Wound - Properties Group Date first assessed: 01/20/19 [DARLENE] Time first assessed: 0300 [DARLENE] Side: Left [DARLENE] Orientation: lower;lateral [DARLENE] Location: chest [DARLENE] Type: MASD (moisture associated skin damage);skin tear [DARLENE] Recorded by:  [DRALENE] Dillan Manley RN 01/20/19 0459    Row Name                Wound 01/20/19 0300 Left proximal calf MASD (moisture associated skin damage);skin tear    Wound - Properties Group Date first assessed: 01/20/19 [DARLENE] Time first assessed: 0300 [DARLENE] Side: Left [DARLENE] Orientation: proximal [DARLENE] Location: calf [DARLENE] Type: MASD (moisture associated skin damage);skin tear [DARLENE] Recorded by:  [DARLENE] Dillan Manley RN 01/20/19 0500    Row Name 01/21/19 0912             NPWT (Negative Pressure Wound Therapy) 01/18/19 0901 R heel    NPWT (Negative Pressure Wound Therapy) - Properties Group Placement Date: 01/18/19 [LM] Placement Time: 0901 [LM] Location: R heel [LM] Recorded by:  [LM] Manuela Gomez, PT 01/18/19 1224    Therapy Setting  continuous therapy  -RM      Dressing  foam, black  -RM      Contact Layer  -- I sheet of adaptic folded intox6 layers cut to fit   -RM      Pressure Setting  125 mmHg  -RM      Sponges Inserted  1  -RM      Sponges Removed  2  -RM      Recorded by [RM] Davion Arias, PTA 01/21/19 1346      Row Name 01/21/19 1315             Outcome Summary/Treatment Plan (OT)    Daily Summary of Progress (OT)  progress toward functional goals is gradual  -TL      Recorded by [TL] Jacqueline Uribe OTR 01/21/19 1545      Row Name 01/21/19 0912             Outcome Summary/Treatment Plan (PT)    Daily Summary of Progress (PT)  progress towards functional goals is fair  -RM      Plan for Continued Treatment (PT)  cont per poc.   -RM      Recorded by [RM] Davion Arias, PTA 01/21/19 1346        User Key  (r) = Recorded By, (t) = Taken By, (c) = Cosigned By     Initials Name Effective Dates Discipline    DARLENEDillan Rivera RN 10/26/16 -  Nurse    Jacqueline Cruz, RN 11/07/16 -  Nurse    Jacqueline Al, OTR 01/21/17 -  OT    LM Manuela Gomez, PT 04/03/18 -  PT    RM Davion Arias, PTA 03/07/18 -  PT        Wound 01/10/19 1925 Right foot (Active)   Dressing Appearance dry;intact 1/21/2019  8:00 AM   Closure VINICIUS 1/21/2019  8:00 AM   Base maroon/purple;black eschar 1/21/2019  8:00 AM   Drainage Amount none 1/20/2019  8:00 PM       Wound 01/10/19 1925 Right lower;lateral leg abrasion (Active)   Dressing Appearance dry;intact 1/21/2019  8:00 AM   Base red/granulating 1/21/2019  8:00 AM   Drainage Amount scant 1/20/2019  8:00 PM   Dressing Care, Wound dressing changed 1/20/2019  8:00 PM       Wound 01/10/19 1925 Left anterior;lower;proximal leg unspecified (Active)   Dressing Appearance dry;intact 1/21/2019  8:00 AM   Closure VINICIUS 1/21/2019  8:00 AM   Drainage Amount none 1/20/2019  8:00 PM   Dressing Care, Wound dressing changed 1/20/2019  8:00 PM       Wound 01/10/19 1925 Left gluteal abrasion (Active)   Dressing Appearance dry;intact 1/21/2019  8:00 AM   Closure VINICIUS 1/21/2019  8:00 AM   Drainage Amount none 1/20/2019  8:00 PM   Dressing Care, Wound dressing changed 1/20/2019  8:00 PM       Wound 01/10/19 1925 Right posterior thigh abrasion (Active)   Dressing Appearance dry;intact 1/21/2019  8:00 AM   Drainage Characteristics/Odor yellow 1/20/2019  8:00 PM   Care, Wound cleansed with;wound cleanser 1/20/2019  8:00 PM   Dressing Care, Wound dressing changed 1/20/2019  8:00 PM       Wound 01/10/19 1925 abdomen (Active)   Dressing Appearance open to air 1/21/2019  8:00 AM   Closure Open to air 1/21/2019  8:00 AM   Base scab 1/20/2019  6:00 PM   Drainage Amount none 1/20/2019  8:00 PM       Wound 01/20/19 0300 Left lower;lateral chest MASD (moisture associated skin damage);skin tear (Active)   Dressing Appearance dry;intact 1/21/2019  8:00 AM   Closure VINICIUS 1/21/2019  8:00 AM    Drainage Amount scant 1/20/2019  6:00 PM   Dressing Care, Wound dressing changed 1/20/2019  6:00 PM       Wound 01/20/19 0300 Left proximal calf MASD (moisture associated skin damage);skin tear (Active)   Dressing Appearance dry 1/21/2019  8:00 AM   Closure Open to air 1/21/2019  8:00 AM   Drainage Amount none 1/20/2019  8:00 PM   Dressing Care, Wound dressing changed 1/20/2019  6:00 PM       NPWT (Negative Pressure Wound Therapy) 01/18/19 0901 R heel (Active)   Therapy Setting continuous therapy 1/21/2019 12:00 PM   Dressing foam, black 1/21/2019 12:00 PM   Pressure Setting 125 mmHg 1/21/2019 12:00 PM   Sponges Inserted 1 1/21/2019  9:12 AM   Sponges Removed 2 1/21/2019  9:12 AM     Rehab Goal Summary     Row Name 01/21/19 1315 01/21/19 1043          Strength Goal 1 (OT)    Progress/Outcome (Strength Goal 1, OT)  goal ongoing  -TL  --        Swallow Goals (SLP)    Oral Nutrition/Hydration Goal Selection (SLP)  --  oral nutrition/hydration, SLP goal 1  -TM     Labial Strengthening Goal Selection (SLP)  --  labial strengthening, SLP goal 1  -TM     Lingual Strengthening Goal Selection (SLP)  --  lingual strengthening, SLP goal 1  -TM        Oral Nutrition/Hydration Goal 1 (SLP)    Oral Nutrition/Hydration Goal 1, SLP  --  tolerate mech soft diet with thin liquids  -TM     Time Frame (Oral Nutrition/Hydration Goal 1, SLP)  --  1 day  -TM     Barriers (Oral Nutrition/Hydration Goal 1, SLP)  --  medical complexity  -TM     Progress/Outcomes (Oral Nutrition/Hydration Goal 1, SLP)  --  continuing progress toward goal  -TM        Labial Strengthening Goal 1 (SLP)    Activity (Labial Strengthening Goal 1, SLP)  --  increase labial tone  -TM     Increase Labial Tone  --  labial resistance exercises;swallow trials  -TM     [place]/Accuracy (Labial Strengthening Goal 1, SLP)  --  independently (over 90% accuracy)  -TM     Time Frame (Labial Strengthening Goal 1, SLP)  --  by discharge  -TM     Barriers (Labial  Strengthening Goal 1, SLP)  --  medical complexity  -TM     Progress/Outcomes (Labial Strengthening Goal 1, SLP)  --  goal ongoing;continuing progress toward goal  -TM        Lingual Strengthening Goal 1 (SLP)    Activity (Lingual Strengthening Goal 1, SLP)  --  increase lingual tone/sensation/control/coordination/movement;increase tongue back strength  -TM     Increase Lingual Tone/Sensation/Control/Coordination/Movement  --  lingual movement exercises;swallow trials;lingual resistance exercises  -TM     Increase Tongue Back Strength  --  lingual movement exercises;swallow trials;lingual resistance exercises  -TM     [place]/Accuracy (Lingual Strengthening Goal 1, SLP)  --  independently (over 90% accuracy)  -TM     Time Frame (Lingual Strengthening Goal 1, SLP)  --  by discharge  -TM     Barriers (Lingual Strengthening Goal 1, SLP)  --  medical complexity  -TM     Progress/Outcomes (Lingual Strengthening Goal 1, SLP)  --  goal ongoing;continuing progress toward goal  -TM       User Key  (r) = Recorded By, (t) = Taken By, (c) = Cosigned By    Initials Name Provider Type Discipline    Jacqueline Al OTR Occupational Therapist OT    Bhavani Bradley Speech and Language Pathologist SLP        Occupational Therapy Education     Title: PT OT SLP Therapies (In Progress)     Topic: Occupational Therapy (In Progress)     Point: ADL training (Done)     Description: Instruct learner(s) on proper safety adaptation and remediation techniques during self care or transfers.   Instruct in proper use of assistive devices.    Learning Progress Summary           Patient Acceptance, E,TB, VU by SD at 1/17/2019  3:23 PM    Comment:  Safety and sequencing during grooming tasks to increase independence.    Acceptance, E, NR by EB at 1/17/2019 10:30 AM    Comment:  pt alert and Dr Reyes at bedside and discussed plan of care with patient.  No family at bedside.    Acceptance, E,TB, VU by SD at 1/16/2019  1:20 PM    Comment:   Benefit of OT; OT POC                   Point: Home exercise program (Done)     Description: Instruct learner(s) on appropriate technique for monitoring, assisting and/or progressing therapeutic exercises/activities.    Learning Progress Summary           Patient Acceptance, E,D, DU,NR by TL at 1/21/2019  3:48 PM    Comment:  Patient able to perform theraband exercises with minimal verbal cuing.    Acceptance, E,TB, VU by SD at 1/18/2019  2:46 PM    Comment:  Benefit of ther ex to improve strength and endurance    Acceptance, E, NR by EB at 1/17/2019 10:30 AM    Comment:  pt alert and Dr Reyes at bedside and discussed plan of care with patient.  No family at bedside.                   Point: Precautions (In Progress)     Description: Instruct learner(s) on prescribed precautions during self-care and functional transfers.    Learning Progress Summary           Patient Acceptance, E, NR by EB at 1/17/2019 10:30 AM    Comment:  pt alert and Dr Reyes at bedside and discussed plan of care with patient.  No family at bedside.                   Point: Body mechanics (In Progress)     Description: Instruct learner(s) on proper positioning and spine alignment during self-care, functional mobility activities and/or exercises.    Learning Progress Summary           Patient Acceptance, E, NR by EB at 1/17/2019 10:30 AM    Comment:  pt alert and Dr Reyes at bedside and discussed plan of care with patient.  No family at bedside.                               User Key     Initials Effective Dates Name Provider Type Discipline     11/07/16 -  Viv Valente RN Registered Nurse Nurse     01/21/17 -  Jacqueline Uribe OTR Occupational Therapist OT    SD 03/07/18 -  Hannah Santos OT Occupational Therapist OT                OT Recommendation and Plan  Outcome Summary/Treatment Plan (OT)  Daily Summary of Progress (OT): progress toward functional goals is gradual  Daily Summary of Progress (OT): progress toward functional goals is  gradual  Outcome Summary: Patient was motivated to participate in OT skilled intervention.  Pt found up in chair. Pt completed B UE yellow theraband exercises with minimal verbal cuing.   Patient was able to maintain oxygen saturation without supplemental oxygen while performing ther ex in bedside chair.  Continue with OT per POC focusing on increasing independence with mobility tasks.    Outcome Measures     Row Name 01/21/19 1315 01/21/19 0912          How much help from another person do you currently need...    Turning from your back to your side while in flat bed without using bedrails?  --  2  -RM     Moving from lying on back to sitting on the side of a flat bed without bedrails?  --  2  -RM     Moving to and from a bed to a chair (including a wheelchair)?  --  2  -RM     Standing up from a chair using your arms (e.g., wheelchair, bedside chair)?  --  1  -RM     Climbing 3-5 steps with a railing?  --  1  -RM     To walk in hospital room?  --  1  -RM     AM-PAC 6 Clicks Score  --  9  -RM        How much help from another is currently needed...    Putting on and taking off regular lower body clothing?  1  -TL  --     Bathing (including washing, rinsing, and drying)  1  -TL  --     Toileting (which includes using toilet bed pan or urinal)  1  -TL  --     Putting on and taking off regular upper body clothing  3  -TL  --     Taking care of personal grooming (such as brushing teeth)  3  -TL  --     Eating meals  3  -TL  --     Score  12  -TL  --        Functional Assessment    Outcome Measure Options  --  AM-PAC 6 Clicks Basic Mobility (PT)  -RM       User Key  (r) = Recorded By, (t) = Taken By, (c) = Cosigned By    Initials Name Provider Type    Jacqueline Al OTR Occupational Therapist    Davion Flores, PTA Physical Therapy Assistant           Time Calculation:   Time Calculation- OT     Row Name 01/21/19 1315             Time Calculation- OT    OT Start Time  1315  -TL      OT Stop Time  1327  -TL      OT  Time Calculation (min)  12 min  -TL      Total Timed Code Minutes- OT  12 minute(s)  -TL      OT Received On  01/21/19  -TL        User Key  (r) = Recorded By, (t) = Taken By, (c) = Cosigned By    Initials Name Provider Type    Jacqueline Al OTR Occupational Therapist           Therapy Suggested Charges     Code   Minutes Charges    75820 (CPT®) Hc Ot Neuromusc Re Education Ea 15 Min      56847 (CPT®) Hc Ot Ther Proc Ea 15 Min 10 1    99695 (CPT®) Hc Ot Therapeutic Act Ea 15 Min 5     93919 (CPT®) Hc Ot Manual Therapy Ea 15 Min      18281 (CPT®) Hc Ot Iontophoresis Ea 15 Min      72612 (CPT®) Hc Ot Elec Stim Ea-Per 15 Min      38394 (CPT®) Hc Ot Ultrasound Ea 15 Min      48591 (CPT®) Hc Ot Self Care/Mgmt/Train Ea 15 Min      Total  15 1        Therapy Charges for Today     Code Description Service Date Service Provider Modifiers Qty    20685703245 HC OT THER PROC EA 15 MIN 1/21/2019 Jacqueline Uribe OTR GO 1               WALESKA Richards  1/21/2019

## 2019-01-21 NOTE — PLAN OF CARE
Problem: Patient Care Overview  Goal: Plan of Care Review  Outcome: Ongoing (interventions implemented as appropriate)   01/21/19 6783   Plan of Care Review   Progress improving   OTHER   Outcome Summary Patient was motivated to participate in OT skilled intervention. Pt found up in chair. Pt completed B UE yellow theraband exercises with minimal verbal cuing. Patient was able to maintain oxygen saturation without supplemental oxygen while performing ther ex in bedside chair. Continue with OT per POC focusing on increasing independence with mobility tasks.

## 2019-01-21 NOTE — THERAPY RE-EVALUATION
Acute Care - Speech Language Pathology   Swallow Re-Assessment [facility]     Patient Name: Millicent Mckeon  : 1960  MRN: [id number]  Today's Date: 2019  Onset of Illness/Injury or Date of Surgery: 19     Referring Physician: Mohit      Admit Date: 1/10/2019    Visit Dx:     ICD-10-CM ICD-9-CM   1. Altered mental status, unspecified altered mental status type R41.82 780.97   2. Cellulitis of both lower extremities L03.115 682.6    L03.116    3. Impaired functional mobility, balance, gait, and endurance Z74.09 V49.89   4. Impaired mobility and ADLs Z74.09 799.89   5. Dysphagia, oropharyngeal phase R13.12 787.22     Patient Active Problem List   Diagnosis   • Altered mental status   • Anemia   • Bilateral edema of lower extremity   • Cellulitis of lower extremity   • Acute on chronic renal failure (CMS/HCC)   • GERD (gastroesophageal reflux disease)   • Hyperkalemia   • Essential hypertension   • Hypothermia   • Hypothyroidism   • Type 2 diabetes mellitus with complication (CMS/HCC)   • Vitamin B12 deficiency   • Cellulitis of both lower extremities   • Acute metabolic encephalopathy     Past Medical History:   Diagnosis Date   • Diabetes mellitus (CMS/HCC)    • Disease of thyroid gland    • GERD (gastroesophageal reflux disease)    • Hypertension      Past Surgical History:   Procedure Laterality Date   • EYE SURGERY     • INCISION AND DRAINAGE LEG Right 2019    Procedure: Right heel incision and drainage with graft application;  Surgeon: Ar Rueda DPM;  Location: [facility];  Service: Podiatry   • LEG SURGERY          SWALLOW EVALUATION (last 72 hours)      SLP Adult Swallow Evaluation     Row Name 19 1043                   Rehab Evaluation    Document Type  re-evaluation  -TM        Total Evaluation Minutes, SLP  11  -TM        Subjective Information  no complaints  -TM        Patient Observations  alert;cooperative;agree to therapy  -TM        Patient Effort  good  -TM         Symptoms Noted During/After Treatment  none  -TM           General Information    Patient Profile Reviewed  yes  -TM        Current Method of Nutrition  pureed;nectar/syrup-thick liquids  -TM        Prior Level of Function-Communication  other (see comments)  -TM        Prior Level of Function-Swallowing  no diet consistency restrictions  -TM        Plans/Goals Discussed with  patient;other (see comments)  -TM        Barriers to Rehab  medically complex  -TM        Patient's Goals for Discharge  patient did not state  -TM           Pain Assessment    Additional Documentation  Pain Scale: Numbers Pre/Post-Treatment (Group)  -TM           Pain Scale: Numbers Pre/Post-Treatment    Pain Scale: Numbers, Pretreatment  0/10 - no pain  -TM        Pain Scale: Numbers, Post-Treatment  0/10 - no pain  -TM           Oral Motor and Function    Dentition Assessment  edentulous, does not have dentures  -TM        Secretion Management  WNL/WFL  -TM        Mucosal Quality  moist, healthy  -TM        Volitional Swallow  WFL  -TM        Volitional Cough  WFL  -TM           Oral Musculature and Cranial Nerve Assessment    Oral Motor General Assessment  oral labial or buccal impairment;lingual impairment  -TM        Oral Labial or Buccal Impairment, Detail, Cranial Nerve VII (Facial):  CN7: Motor Impairment;reduced ROM;reduced strength bilaterally;left labial droop  -TM        Lingual Impairment, Detail. Cranial Nerves IX, XII (Glossopharyngeal and Hypoglossal)  CN12: Motor Impairment;reduced lingual ROM;reduced strength;reduced strength left  -TM        Oral Motor, Comment  improved lingual strength with only min-mild weakness  -TM           General Eating/Swallowing Observations    Eating/Swallowing Skills  self-fed;fed by SLP  -TM        Positioning During Eating  upright 90 degree;upright in bed  -TM        Utensils Used  spoon;cup;straw  -TM        Consistencies Trialed  mechanical soft, no mixed consistencies;thin liquids  -TM            Respiratory    Respiratory Status  WFL  -TM           Clinical Swallow Eval    Oral Prep Phase  impaired  -TM        Oral Transit  WFL  -TM        Oral Residue  WFL  -TM        Pharyngeal Phase  no overt signs/symptoms of pharyngeal impairment  -TM        Clinical Swallow Evaluation Summary  Improved oral phase from previous assessment, but continues to exhibit some min-mild dysphagia due to labial & lingual weakness and edentulous.  She still does not demonstrate strong lip closure on utensil, but adequate.  She demonstrated minimal difficulty with bolus prep with mech soft trials.  No overt s/s aspiration with any trial.  Control with thin liquids was within expected limits per palpation and coordination was effective with consecutive swallows with straw and open cup.  Recommend:  1. mechanical soft diet with thin liq as javon,  2. meds as javon,  3. aspiration precautions, 4. reflux precautions.  D/W pt. and RN.    -TM           Oral Prep Concerns    Oral Prep Concerns  incomplete or weak lip closure around spoon;increased prep time  -TM        Incomplete or Weak Lip Closure Around Spoon  other (see comments) reduced seal on spoon/utensil  -TM        Increased Prep Time  mechanical soft edentulous, but WFL for mech soft trials  -TM           Clinical Impression    SLP Swallowing Diagnosis  mild;functional oral phase;functional pharyngeal phase  -TM        Functional Impact  risk of aspiration/pneumonia  -TM           Recommendations    Therapy Frequency (Swallow)  PRN  -TM        Predicted Duration Therapy Intervention (Days)  until discharge  -TM        SLP Diet Recommendation  mechanical soft with no mixed consistencies;thin liquids  -TM        Recommended Precautions and Strategies  upright posture during/after eating  -TM        SLP Rec. for Method of Medication Administration  meds whole;as tolerated  -TM        Monitor for Signs of Aspiration  yes;notify SLP if any concerns  -TM           Swallow Goals  (SLP)    Oral Nutrition/Hydration Goal Selection (SLP)  oral nutrition/hydration, SLP goal 1  -TM        Labial Strengthening Goal Selection (SLP)  labial strengthening, SLP goal 1  -TM        Lingual Strengthening Goal Selection (SLP)  lingual strengthening, SLP goal 1  -TM           Oral Nutrition/Hydration Goal 1 (SLP)    Oral Nutrition/Hydration Goal 1, SLP  tolerate [facility] soft diet with thin liquids  -TM        Time Frame (Oral Nutrition/Hydration Goal 1, SLP)  1 day  -TM        Barriers (Oral Nutrition/Hydration Goal 1, SLP)  medical complexity  -TM        Progress/Outcomes (Oral Nutrition/Hydration Goal 1, SLP)  continuing progress toward goal  -TM           Labial Strengthening Goal 1 (SLP)    Activity (Labial Strengthening Goal 1, SLP)  increase labial tone  -TM        Increase Labial Tone  labial resistance exercises;swallow trials  -TM        [place]/Accuracy (Labial Strengthening Goal 1, SLP)  independently (over 90% accuracy)  -TM        Time Frame (Labial Strengthening Goal 1, SLP)  by discharge  -TM        Barriers (Labial Strengthening Goal 1, SLP)  medical complexity  -TM        Progress/Outcomes (Labial Strengthening Goal 1, SLP)  goal ongoing;continuing progress toward goal  -TM           Lingual Strengthening Goal 1 (SLP)    Activity (Lingual Strengthening Goal 1, SLP)  increase lingual tone/sensation/control/coordination/movement;increase tongue back strength  -TM        Increase Lingual Tone/Sensation/Control/Coordination/Movement  lingual movement exercises;swallow trials;lingual resistance exercises  -TM        Increase Tongue Back Strength  lingual movement exercises;swallow trials;lingual resistance exercises  -TM        [place]/Accuracy (Lingual Strengthening Goal 1, SLP)  independently (over 90% accuracy)  -TM        Time Frame (Lingual Strengthening Goal 1, SLP)  by discharge  -TM        Barriers (Lingual Strengthening Goal 1, SLP)  medical complexity  -TM         Progress/Outcomes (Lingual Strengthening Goal 1, SLP)  goal ongoing;continuing progress toward goal  -TM          User Key  (r) = Recorded By, (t) = Taken By, (c) = Cosigned By    Initials Name Effective Dates    TM Bhavani Wood 03/07/18 -           EDUCATION  The patient has been educated in the following areas:   Dysphagia (Swallowing Impairment) Modified Diet Instruction.    SLP Recommendation and Plan  SLP Swallowing Diagnosis: mild, functional oral phase, functional pharyngeal phase  SLP Diet Recommendation: mechanical soft with no mixed consistencies, thin liquids  Recommended Precautions and Strategies: upright posture during/after eating     Monitor for Signs of Aspiration: yes, notify SLP if any concerns              Therapy Frequency (Swallow): PRN  Predicted Duration Therapy Intervention (Days): until discharge       Plan of Care Reviewed With: patient  Plan of Care Review  Plan of Care Reviewed With: patient  Daily Summary of Progress (SLP): progress toward functional goals is gradual  Plan for Continued Treatment (SLP): pending MBS results  Progress: improving  Outcome Summary: Bedside re-eval completed.  Pt. demonstrated good improvement with dysphagia, tolerating trials of mech soft with thin liquid well without s/s aspiration.  See re-eval report for details.  Recommend upgrade diet to mechanical soft with thin liquids, meds whole as javon,  aspiration precautions, and reflux precautions.  D/W pt. and RN.  Continue swallow therapy as javon.     SLP GOALS     Row Name 01/21/19 1043             Oral Nutrition/Hydration Goal 1 (SLP)    Oral Nutrition/Hydration Goal 1, SLP  tolerate mech soft diet with thin liquids  -TM      Time Frame (Oral Nutrition/Hydration Goal 1, SLP)  1 day  -TM      Barriers (Oral Nutrition/Hydration Goal 1, SLP)  medical complexity  -TM      Progress/Outcomes (Oral Nutrition/Hydration Goal 1, SLP)  continuing progress toward goal  -TM         Labial Strengthening Goal 1 (SLP)     Activity (Labial Strengthening Goal 1, SLP)  increase labial tone  -TM      Increase Labial Tone  labial resistance exercises;swallow trials  -TM      [place]/Accuracy (Labial Strengthening Goal 1, SLP)  independently (over 90% accuracy)  -TM      Time Frame (Labial Strengthening Goal 1, SLP)  by discharge  -TM      Barriers (Labial Strengthening Goal 1, SLP)  medical complexity  -TM      Progress/Outcomes (Labial Strengthening Goal 1, SLP)  goal ongoing;continuing progress toward goal  -TM         Lingual Strengthening Goal 1 (SLP)    Activity (Lingual Strengthening Goal 1, SLP)  increase lingual tone/sensation/control/coordination/movement;increase tongue back strength  -TM      Increase Lingual Tone/Sensation/Control/Coordination/Movement  lingual movement exercises;swallow trials;lingual resistance exercises  -TM      Increase Tongue Back Strength  lingual movement exercises;swallow trials;lingual resistance exercises  -TM      [place]/Accuracy (Lingual Strengthening Goal 1, SLP)  independently (over 90% accuracy)  -TM      Time Frame (Lingual Strengthening Goal 1, SLP)  by discharge  -TM      Barriers (Lingual Strengthening Goal 1, SLP)  medical complexity  -TM      Progress/Outcomes (Lingual Strengthening Goal 1, SLP)  goal ongoing;continuing progress toward goal  -TM        User Key  (r) = Recorded By, (t) = Taken By, (c) = Cosigned By    Initials Name Provider Type     Bhavani Wood Speech and Language Pathologist             Time Calculation:   Time Calculation- SLP     Row Name 01/21/19 1138             Time Calculation- SLP    SLP Start Time  1043  -TM      SLP Stop Time  1054  -TM      SLP Time Calculation (min)  11 min  -TM      SLP Received On  01/21/19  -        User Key  (r) = Recorded By, (t) = Taken By, (c) = Cosigned By    Initials Name Provider Type     Bhavani Wood Speech and Language Pathologist          Therapy Charges for Today     Code Description Service Date Service  Provider Modifiers Qty    [id number] HC ST TREATMENT SWALLOW 1 1/21/2019 Bhavani Wood 1               Bhavani Wood  1/21/2019

## 2019-01-21 NOTE — PLAN OF CARE
Problem: Patient Care Overview  Goal: Plan of Care Review  Outcome: Ongoing (interventions implemented as appropriate)   01/21/19 1130   Coping/Psychosocial   Plan of Care Reviewed With patient   OTHER   Outcome Summary Bedside re-eval completed. Pt. demonstrated good improvement with dysphagia, tolerating trials of mech soft with thin liquid well without s/s aspiration. See re-eval report for details. Recommend upgrade diet to mechanical soft with thin liquids, meds whole as javon, aspiration precautions, and reflux precautions. D/W pt. and RN. Continue swallow therapy as javon.

## 2019-01-21 NOTE — PROGRESS NOTES
Patient Care Team:  Marianela Albright APRN as PCP - General (Family Medicine)  Geremias Shepherd MD as Consulting Physician (General Surgery)    Chief complaint right heel    Subjective .   57yo dm female status post right heel wound debridement and grafting with wound vac placement.   He has now been transferred out of the intensive care unit to the [facility] floor.  She is able to talk and discuss with me now.  She is able to tell me about her brothers and sisters and family however none of those are close or local.  She tells me she lives in [facility] and thinks she is going to rehabilitation there.  Denies any constitutional symptoms or pain.    Review of Systems  All systems were reviewed and negative except for chief complaint.    History  Past Medical History:   Diagnosis Date   • Diabetes mellitus (CMS/HCC)    • Disease of thyroid gland    • GERD (gastroesophageal reflux disease)    • Hypertension    , Past Surgical History:   Procedure Laterality Date   • EYE SURGERY     • INCISION AND DRAINAGE LEG Right 1/14/2019    Procedure: Right heel incision and drainage with graft application;  Surgeon: Ar Rueda DPM;  Location: [facility];  Service: Podiatry   • LEG SURGERY     , Family History   Problem Relation Age of Onset   • Asthma Mother    • Hypertension Father    • Stroke Father    , Social History     Tobacco Use   • Smoking status: Never Smoker   • Smokeless tobacco: Never Used   Substance Use Topics   • Alcohol use: No     Frequency: Never   • Drug use: No   , Medications Prior to Admission   Medication Sig Dispense Refill Last Dose   • ferrous sulfate 324 (65 Fe) MG tablet delayed-release EC tablet Take 324 mg by mouth 2 (Two) Times a Day.      • gabapentin (NEURONTIN) 600 MG tablet Take 600 mg by mouth 3 (Three) Times a Day.      • glipiZIDE (GLUCOTROL) 10 MG tablet Take 20 mg by mouth 2 (Two) Times a Day.      • Insulin Glargine (BASAGLAR KWIKPEN) 100 UNIT/ML injection pen Inject 30 Units  under the skin into the appropriate area as directed Every Night.      • meclizine 25 MG chewable tablet chewable tablet Chew 25 mg 3 (Three) Times a Day As Needed (for dizziness or motion sickness).      • allopurinol (ZYLOPRIM) 300 MG tablet Take 300 mg by mouth Daily.      • amLODIPine (NORVASC) 5 MG tablet Take 5 mg by mouth Daily.  0    • esomeprazole (nexIUM) 40 MG capsule Take 40 mg by mouth Every Morning Before Breakfast.  0    • furosemide (LASIX) 20 MG tablet Take 20 mg by mouth 2 (Two) Times a Day.      • RA ASPIRIN EC 81 MG EC tablet Take 81 mg by mouth Daily.  0    • RA VITAMIN C 500 MG tablet Take 500 mg by mouth Daily.  0    • RA VITAMIN D-3 2000 units capsule Take 2,000 Units by mouth Daily.  0    • simvastatin (ZOCOR) 20 MG tablet Take 20 mg by mouth Every Night.  0    • TRADJENTA 5 MG tablet tablet Take 5 mg by mouth Daily.      , Scheduled Meds:    carvedilol 6.25 mg Oral BID With Meals   famotidine 20 mg Intravenous Daily   heparin (porcine) 5,000 Units Subcutaneous Q12H   hydrALAZINE 25 mg Nasogastric Q8H   hydrocortisone sodium succinate 50 mg Intravenous Q12H   insulin aspart 0-7 Units Subcutaneous 4x Daily AC & at Bedtime   insulin detemir 30 Units Subcutaneous QAM   lactobacillus acidophilus 1 capsule Nasogastric Daily   levothyroxine 100 mcg Nasogastric Q AM   sodium chloride 3 mL Intravenous Q12H   spironolactone 25 mg Oral Daily   , Continuous Infusions:    custom IV KCl infusion builder 250 mL/hr Last Rate: 250 mL/hr (19 1350)   custom IV KCl infusion builder 250 mL/hr Last Rate: 250 mL/hr (19 1820)   sodium chloride 10 mL/hr Last Rate: 10 mL/hr (19 2216)   , PRN Meds:  •  acetaminophen  •  aluminum-magnesium hydroxide-simethicone  •  CHAPSTICK  •  dextrose  •  dextrose  •  glucagon (human recombinant)  •  glycerin  •  heparin (porcine)  •  hydrALAZINE  •  LORazepam  •  [] Morphine **AND** naloxone  •  ondansetron **OR** ondansetron ODT **OR** ondansetron  •   "promethazine  •  sodium chloride  •  zinc oxide and Allergies:  Metformin and related    Objective     Vital Signs   /75 (BP Location: Left arm, Patient Position: Lying)   Pulse 73   Temp 98 °F (36.7 °C) (Axillary)   Resp 18   Ht 165.1 cm (65\")   Wt (!) 137 kg (303 lb 1.6 oz)   SpO2 94%   BMI 50.44 kg/m²     Physical Exam: She is awake alert and oriented ×3, no acute distress, afebrile with vital signs stable  Wound VAC is intact to the right foot.  No drainage in the canister her suction tubing.  Sealed well at 125 mm suction.  She is able to plantarflex and dorsiflex the digits and the ankle.  Dressing is intact with no strikethrough.  Capillary refill time to the digits is brisk.     Results Review: Laboratory data reviewed.  White blood cell count is down to 6.8.  Hemoglobin 8.3.  Hematocrit 29.1.  Blood glucose 159.  BUN 62.  Creatinine 1.8.      Assessment/Plan   57yo dm female status post right heel wound debridement and grafting with wound vac placement.     Cellulitis of both lower extremities    Anemia    Bilateral edema of lower extremity    Cellulitis of lower extremity    Acute on chronic renal failure (CMS/HCC)    GERD (gastroesophageal reflux disease)    Hyperkalemia    Essential hypertension    Hypothermia    Hypothyroidism    Type 2 diabetes mellitus with complication (CMS/HCC)    Vitamin B12 deficiency    Acute metabolic encephalopathy  She appears to be significantly improved and still continues to improve day by day.  I am told she may be discharged to a rehabilitation facility tomorrow.  She will need wound VAC therapy for probably at least the next 2-3 weeks.  She will need Monday, Wednesday, Friday wound VAC changes and we will most likely see me in the office on Wednesdays.  Continue antibiotics per primary team.  Continue to float and offload the heel at all times.  She is free to work with physical therapy as much as she can but she is to be nonweightbearing to the right heel.  " Call me with any questions.  We will continue to follow and ensure she has follow-up after discharge.        Ar Rueda DPM  01/21/19  6:54 PM

## 2019-01-21 NOTE — PLAN OF CARE
Problem: Patient Care Overview  Goal: Plan of Care Review  Outcome: Ongoing (interventions implemented as appropriate)   01/21/19 4381   Coping/Psychosocial   Plan of Care Reviewed With patient   Plan of Care Review   Progress improving   OTHER   Outcome Summary VSS, pt worked with physical therapy today, sat up on a chair, tolerating mechanical soft diet, no acute changes noted, will continue to monitor.       Problem: Fall Risk (Adult)  Goal: Absence of Fall  Outcome: Ongoing (interventions implemented as appropriate)      Problem: Skin Injury Risk (Adult)  Goal: Skin Health and Integrity  Outcome: Ongoing (interventions implemented as appropriate)      Problem: Skin and Soft Tissue Infection (Adult)  Goal: Signs and Symptoms of Listed Potential Problems Will be Absent, Minimized or Managed (Skin and Soft Tissue Infection)  Outcome: Ongoing (interventions implemented as appropriate)      Problem: Wound (Includes Pressure Injury) (Adult)  Goal: Signs and Symptoms of Listed Potential Problems Will be Absent, Minimized or Managed (Wound)  Outcome: Ongoing (interventions implemented as appropriate)

## 2019-01-21 NOTE — THERAPY TREATMENT NOTE
Acute Care - Physical Therapy Treatment Note   [facility]     Patient Name: Millicent Mckeon  : 1960  MRN: [id number]  Today's Date: 2019  Onset of Illness/Injury or Date of Surgery: 19  Date of Referral to PT: 19  Referring Physician: Mohit    Admit Date: 1/10/2019    Visit Dx:    ICD-10-CM ICD-9-CM   1. Altered mental status, unspecified altered mental status type R41.82 780.97   2. Cellulitis of both lower extremities L03.115 682.6    L03.116    3. Impaired functional mobility, balance, gait, and endurance Z74.09 V49.89   4. Impaired mobility and ADLs Z74.09 799.89   5. Dysphagia, oropharyngeal phase R13.12 787.22     Patient Active Problem List   Diagnosis   • Altered mental status   • Anemia   • Bilateral edema of lower extremity   • Cellulitis of lower extremity   • Acute on chronic renal failure (CMS/HCC)   • GERD (gastroesophageal reflux disease)   • Hyperkalemia   • Essential hypertension   • Hypothermia   • Hypothyroidism   • Type 2 diabetes mellitus with complication (CMS/HCC)   • Vitamin B12 deficiency   • Cellulitis of both lower extremities   • Acute metabolic encephalopathy       Therapy Treatment    Rehabilitation Treatment Summary     Row Name 19 0912             Treatment Time/Intention    Discipline  physical therapy assistant  -RM      Document Type  therapy note (daily note)  -RM      Subjective Information  complains of;fatigue;weakness  -RM      Mode of Treatment  physical therapy  -RM      Care Plan Review  care plan/treatment goals reviewed  -RM      Patient Effort  fair  -RM      Treatment Considerations/Comments  weakness   -RM      Recorded by [RM] Davion Arias, PTA 19 1346      Row Name 19 0912             Safety Issues, Functional Mobility    Safety Issues Affecting Function (Mobility)  safety precautions follow-through/compliance;sequencing abilities  -RM      Impairments Affecting Function (Mobility)  endurance/activity tolerance;strength   -RM      Recorded by [RM] Davion Arias, [facility] 01/21/19 1346      Row Name 01/21/19 0912             Mobility Assessment/Intervention    Right Lower Extremity (Weight-bearing Status)  non weight-bearing (NWB)  -RM      Recorded by [RM] Davion Arias, [facility] 01/21/19 1346      Row Name 01/21/19 0912             Bed Mobility Assessment/Treatment    Supine-Sit Ramsey (Bed Mobility)  moderate assist (50% patient effort)  -RM      Recorded by [] Davion Arias, [facility] 01/21/19 1346      Row Name 01/21/19 0912             Transfer Assessment/Treatment    Transfer Assessment/Treatment  bed-chair transfer  -RM      Recorded by [] Davion Arias, [facility] 01/21/19 1346      Row Name 01/21/19 0912             Bed-Chair Transfer    Bed-Chair Ramsey (Transfers)  maximum assist (25% patient effort) lateral scoot   -RM      Recorded by [] aDvion Arias, [facility] 01/21/19 1346      Row Name 01/21/19 0912             Positioning and Restraints    Pre-Treatment Position  in bed  -RM      Post Treatment Position  chair  -RM      In Chair  reclined;call light within reach;encouraged to call for assist;waffle boot/both;notified nsg  -RM      Recorded by [RM] Davion Arias, [facility] 01/21/19 1346      Row Name 01/21/19 0912             Pain Scale: Numbers Pre/Post-Treatment    Pain Scale: Numbers, Pretreatment  0/10 - no pain  -RM      Pain Scale: Numbers, Post-Treatment  0/10 - no pain  -RM      Recorded by [RM] Davion Arias, [facility] 01/21/19 1346      Row Name                Wound 01/10/19 1925 Right foot    Wound - Properties Group Date first assessed: 01/10/19 [CH] Time first assessed: 1925 [CH] Side: Right [CH] Location: foot [CH] Stage, Pressure Injury: deep tissue injury;unstageable [CH] Additional Comments: right heel. [CH2] Recorded by:  [CH] Jacqueline Munroe RN 01/11/19 0215 [CH2] Jacqueline Munroe RN 01/11/19 0225    Row Name                Wound 01/10/19 1925 Right lower;lateral leg abrasion    Wound -  Properties Group Date first assessed: 01/10/19 [CH] Time first assessed: 1925 [CH] Side: Right [CH] Orientation: lower;lateral [CH] Location: leg [CH] Type: abrasion [CH] Stage, Pressure Injury: Stage 1 [CH] Recorded by:  [CH] Jacqueline Munroe RN 01/11/19 0217    Row Name                Wound 01/10/19 1925 Left anterior;lower;proximal leg unspecified    Wound - Properties Group Date first assessed: 01/10/19 [CH] Time first assessed: 1925 [CH] Side: Left [CH] Orientation: anterior;lower;proximal [CH] Location: leg [CH] Type: unspecified [CH] Recorded by:  [CHJacqueline Hobbs RN 01/11/19 0221    Row Name                Wound 01/10/19 1925 Left gluteal abrasion    Wound - Properties Group Date first assessed: 01/10/19 [CH] Time first assessed: 1925 [CH] Present On Admission : yes;picture taken [CH] Side: Left [CH] Location: gluteal [CH] Type: abrasion [CH] Recorded by:  [CH] Jacqueline Munroe RN 01/11/19 0224    Row Name                Wound 01/10/19 1925 Right posterior thigh abrasion    Wound - Properties Group Date first assessed: 01/10/19 [CH] Time first assessed: 1925 [CH] Present On Admission : yes;picture taken [CH] Side: Right [CH] Orientation: posterior [CH] Location: thigh [CH] Type: abrasion [CH] Recorded by:  [CH] Jacqueline Munroe RN 01/11/19 0230    Row Name                Wound 01/10/19 1925    Wound - Properties Group Date first assessed: 01/10/19 [CH] Time first assessed: 1925 [CH] Present On Admission : yes;picture taken [CH] Recorded by:  [CH] Jacqueline Munroe RN 01/11/19 0302    Row Name                Wound 01/10/19 1925 abdomen    Wound - Properties Group Date first assessed: 01/10/19 [CH] Time first assessed: 1925 [CH] Present On Admission : yes;picture taken [CH] Location: abdomen [CH] Recorded by:  [CHJacqueline Hobbs RN 01/11/19 0323    Row Name                Wound 01/20/19 0300 Left lower;lateral chest MASD (moisture associated skin damage);skin tear    Wound - Properties Group Date first  assessed: 01/20/19 [DARLENE] Time first assessed: 0300 [DARLENE] Side: Left [DARLENE] Orientation: lower;lateral [DARLENE] Location: chest [DARLENE] Type: MASD (moisture associated skin damage);skin tear [DARLENE] Recorded by:  [DARLENE] Dillan Manley RN 01/20/19 0459    Row Name                Wound 01/20/19 0300 Left proximal calf MASD (moisture associated skin damage);skin tear    Wound - Properties Group Date first assessed: 01/20/19 [DARLENE] Time first assessed: 0300 [DARLENE] Side: Left [DARLENE] Orientation: proximal [DARLENE] Location: calf [DARLENE] Type: MASD (moisture associated skin damage);skin tear [DARLENE] Recorded by:  [DARLENE] Dillan Manley RN 01/20/19 0500    Row Name 01/21/19 0912             NPWT (Negative Pressure Wound Therapy) 01/18/19 0901 R heel    NPWT (Negative Pressure Wound Therapy) - Properties Group Placement Date: 01/18/19 [LM] Placement Time: 0901 [LM] Location: R heel [LM] Recorded by:  [KATERYNA] Manuela Gomez, PT 01/18/19 1224    Therapy Setting  continuous therapy  -RM      Dressing  foam, black  -RM      Contact Layer  -- I sheet of adaptic folded intox6 layers cut to fit   -RM      Pressure Setting  125 mmHg  -RM      Sponges Inserted  1  -RM      Sponges Removed  2  -RM      Recorded by [RM] Davion Arias, PTA 01/21/19 1346      Row Name 01/21/19 0912             Outcome Summary/Treatment Plan (PT)    Daily Summary of Progress (PT)  progress towards functional goals is fair  -RM      Plan for Continued Treatment (PT)  cont per poc.   -RM      Recorded by [RM] Davion Arias, PTA 01/21/19 1346        User Key  (r) = Recorded By, (t) = Taken By, (c) = Cosigned By    Initials Name Effective Dates Discipline    DARLENE Dillan Manley RN 10/26/16 -  Nurse    Jacqueline Cruz RN 11/07/16 -  Nurse    Manuela Acevedo, PT 04/03/18 -  PT    RM Davion Arias, PTA 03/07/18 -  PT          Wound 01/10/19 1925 Right foot (Active)   Dressing Appearance dry;intact 1/21/2019  8:00 AM   Closure VINICIUS 1/21/2019  8:00 AM   Base maroon/purple;black eschar  1/21/2019  8:00 AM   Drainage Amount none 1/20/2019  8:00 PM       Wound 01/10/19 1925 Right lower;lateral leg abrasion (Active)   Dressing Appearance dry;intact 1/21/2019  8:00 AM   Base red/granulating 1/21/2019  8:00 AM   Drainage Amount scant 1/20/2019  8:00 PM   Dressing Care, Wound dressing changed 1/20/2019  8:00 PM       Wound 01/10/19 1925 Left anterior;lower;proximal leg unspecified (Active)   Dressing Appearance dry;intact 1/21/2019  8:00 AM   Closure VINICIUS 1/21/2019  8:00 AM   Drainage Amount none 1/20/2019  8:00 PM   Dressing Care, Wound dressing changed 1/20/2019  8:00 PM       Wound 01/10/19 1925 Left gluteal abrasion (Active)   Dressing Appearance dry;intact 1/21/2019  8:00 AM   Closure VINICIUS 1/21/2019  8:00 AM   Drainage Amount none 1/20/2019  8:00 PM   Dressing Care, Wound dressing changed 1/20/2019  8:00 PM       Wound 01/10/19 1925 Right posterior thigh abrasion (Active)   Dressing Appearance dry;intact 1/21/2019  8:00 AM   Drainage Characteristics/Odor yellow 1/20/2019  8:00 PM   Care, Wound cleansed with;wound cleanser 1/20/2019  8:00 PM   Dressing Care, Wound dressing changed 1/20/2019  8:00 PM       Wound 01/10/19 1925 abdomen (Active)   Dressing Appearance open to air 1/21/2019  8:00 AM   Closure Open to air 1/21/2019  8:00 AM   Base scab 1/20/2019  6:00 PM   Drainage Amount none 1/20/2019  8:00 PM       Wound 01/20/19 0300 Left lower;lateral chest MASD (moisture associated skin damage);skin tear (Active)   Dressing Appearance dry;intact 1/21/2019  8:00 AM   Closure VINICIUS 1/21/2019  8:00 AM   Drainage Amount scant 1/20/2019  6:00 PM   Dressing Care, Wound dressing changed 1/20/2019  6:00 PM       Wound 01/20/19 0300 Left proximal calf MASD (moisture associated skin damage);skin tear (Active)   Dressing Appearance dry 1/21/2019  8:00 AM   Closure Open to air 1/21/2019  8:00 AM   Drainage Amount none 1/20/2019  8:00 PM   Dressing Care, Wound dressing changed 1/20/2019  6:00 PM       NPWT (Negative  Pressure Wound Therapy) 01/18/19 0901 R heel (Active)   Therapy Setting continuous therapy 1/21/2019 12:00 PM   Dressing foam, black 1/21/2019 12:00 PM   Pressure Setting 125 mmHg 1/21/2019 12:00 PM   Sponges Inserted 1 1/21/2019  9:12 AM   Sponges Removed 2 1/21/2019  9:12 AM       Rehab Goal Summary     Row Name 01/21/19 1043             Swallow Goals (SLP)    Oral Nutrition/Hydration Goal Selection (SLP)  oral nutrition/hydration, SLP goal 1  -TM      Labial Strengthening Goal Selection (SLP)  labial strengthening, SLP goal 1  -TM      Lingual Strengthening Goal Selection (SLP)  lingual strengthening, SLP goal 1  -TM         Oral Nutrition/Hydration Goal 1 (SLP)    Oral Nutrition/Hydration Goal 1, SLP  tolerate [facility] soft diet with thin liquids  -TM      Time Frame (Oral Nutrition/Hydration Goal 1, SLP)  1 day  -TM      Barriers (Oral Nutrition/Hydration Goal 1, SLP)  medical complexity  -TM      Progress/Outcomes (Oral Nutrition/Hydration Goal 1, SLP)  continuing progress toward goal  -TM         Labial Strengthening Goal 1 (SLP)    Activity (Labial Strengthening Goal 1, SLP)  increase labial tone  -TM      Increase Labial Tone  labial resistance exercises;swallow trials  -TM      [place]/Accuracy (Labial Strengthening Goal 1, SLP)  independently (over 90% accuracy)  -TM      Time Frame (Labial Strengthening Goal 1, SLP)  by discharge  -TM      Barriers (Labial Strengthening Goal 1, SLP)  medical complexity  -TM      Progress/Outcomes (Labial Strengthening Goal 1, SLP)  goal ongoing;continuing progress toward goal  -TM         Lingual Strengthening Goal 1 (SLP)    Activity (Lingual Strengthening Goal 1, SLP)  increase lingual tone/sensation/control/coordination/movement;increase tongue back strength  -TM      Increase Lingual Tone/Sensation/Control/Coordination/Movement  lingual movement exercises;swallow trials;lingual resistance exercises  -TM      Increase Tongue Back Strength  lingual movement  exercises;swallow trials;lingual resistance exercises  -TM      [place]/Accuracy (Lingual Strengthening Goal 1, SLP)  independently (over 90% accuracy)  -TM      Time Frame (Lingual Strengthening Goal 1, SLP)  by discharge  -TM      Barriers (Lingual Strengthening Goal 1, SLP)  medical complexity  -TM      Progress/Outcomes (Lingual Strengthening Goal 1, SLP)  goal ongoing;continuing progress toward goal  -TM        User Key  (r) = Recorded By, (t) = Taken By, (c) = Cosigned By    Initials Name Provider Type Discipline    TM Bhavani Wood Speech and Language Pathologist SLP          Physical Therapy Education     Title: PT OT SLP Therapies (In Progress)     Topic: Physical Therapy (Done)     Point: Mobility training (Done)     Learning Progress Summary           Patient Acceptance, E,TB,D, VU,NR by RM at 1/21/2019  1:46 PM    Acceptance, E,TB, VU by LM at 1/18/2019 12:41 PM    Comment:  Importance of consistent activity with PT to improve mobility.    Acceptance, E,TB, VU by LM at 1/17/2019  3:16 PM    Comment:  Ther ex for strengthening.    Acceptance, E, NR by EB at 1/17/2019 10:30 AM    Comment:  pt alert and Dr Reyes at bedside and discussed plan of care with patient.  No family at bedside.    Acceptance, E,TB, VU by LM at 1/16/2019 11:45 AM    Comment:  Purpose of PT/POC.                   Point: Home exercise program (Done)     Learning Progress Summary           Patient Acceptance, E,TB, VU by LM at 1/18/2019 12:41 PM    Comment:  Importance of consistent activity with PT to improve mobility.    Acceptance, E,TB, VU by LM at 1/17/2019  3:16 PM    Comment:  Ther ex for strengthening.    Acceptance, E, NR by EB at 1/17/2019 10:30 AM    Comment:  pt alert and Dr Reyes at bedside and discussed plan of care with patient.  No family at bedside.    Acceptance, E,TB, VU by LM at 1/16/2019 11:45 AM    Comment:  Purpose of PT/POC.                   Point: Precautions (Done)     Learning Progress Summary            Patient Acceptance, E,TB, VU by LM at 1/18/2019 12:41 PM    Comment:  Importance of consistent activity with PT to improve mobility.    Acceptance, E,TB, VU by  at 1/17/2019  3:16 PM    Comment:  Ther ex for strengthening.    Acceptance, E, NR by EB at 1/17/2019 10:30 AM    Comment:  pt alert and Dr Reyes at bedside and discussed plan of care with patient.  No family at bedside.    Acceptance, E,TB, VU by  at 1/16/2019 11:45 AM    Comment:  Purpose of PT/POC.                               User Key     Initials Effective Dates Name Provider Type Discipline    EB 11/07/16 -  Viv Valente RN Registered Nurse Nurse     04/03/18 -  Manuela Gomez, PT Physical Therapist PT     03/07/18 -  Davion Arias, PTA Physical Therapy Assistant PT                PT Recommendation and Plan     Outcome Summary/Treatment Plan (PT)  Daily Summary of Progress (PT): progress towards functional goals is fair  Plan for Continued Treatment (PT): cont per poc.   Plan of Care Reviewed With: patient  Progress: improving  Outcome Summary: WOUND VAC: Old dressing removed . Wound bed irrigated with normal saline. Skin prep x 2 layers around periwound area. Wound bed pink.  Dressing reapplied as in previous treatment. MOBILITY: Pt was able to perform bed mobility with decreased assistance and a lateral scoot transfer fron bed to chair. Pt became very fatigued at midpoint of transfer and NASR was called in to assist with remaining portion of transfer. Pt required max a for transfer for first half and max a x2 for second. See flowsheet for details.   Outcome Measures     Row Name 01/21/19 0912             How much help from another person do you currently need...    Turning from your back to your side while in flat bed without using bedrails?  2  -RM      Moving from lying on back to sitting on the side of a flat bed without bedrails?  2  -RM      Moving to and from a bed to a chair (including a wheelchair)?  2  -RM      Standing up from  a chair using your arms (e.g., wheelchair, bedside chair)?  1  -RM      Climbing 3-5 steps with a railing?  1  -RM      To walk in hospital room?  1  -RM      AM-PAC 6 Clicks Score  9  -RM         Functional Assessment    Outcome Measure Options  AM-PAC 6 Clicks Basic Mobility (PT)  -RM        User Key  (r) = Recorded By, (t) = Taken By, (c) = Cosigned By    Initials Name Provider Type    Davion Flores, FLORESITA Physical Therapy Assistant         Time Calculation:   PT Charges     Row Name 01/21/19 1352             Time Calculation    Start Time  0912  -RM      Stop Time  1042  -RM      Time Calculation (min)  90 min  -RM      PT Received On  01/21/19  -RM      PT Goal Re-Cert Due Date  01/28/19  -RM         Time Calculation- PT    Total Timed Code Minutes- PT  90 minute(s)  -RM         Timed Charges    90193 - PT Therapeutic Activity Minutes  45  -RM        User Key  (r) = Recorded By, (t) = Taken By, (c) = Cosigned By    Initials Name Provider Type    Davion Flores, FLORESITA Physical Therapy Assistant        Therapy Suggested Charges     Code   Minutes Charges    92739 (CPT®) Hc Pt Neuromusc Re Education Ea 15 Min      36270 (CPT®) Hc Pt Ther Proc Ea 15 Min      90340 (CPT®) Hc Gait Training Ea 15 Min      06479 (CPT®) Hc Pt Therapeutic Act Ea 15 Min 45 3    07994 (CPT®) Hc Pt Manual Therapy Ea 15 Min      55398 (CPT®) Hc Pt Iontophoresis Ea 15 Min      69677 (CPT®) Hc Pt Elec Stim Ea-Per 15 Min      43242 (CPT®) Hc Pt Ultrasound Ea 15 Min      77562 (CPT®) Hc Pt Self Care/Mgmt/Train Ea 15 Min      39129 (CPT®) Hc Pt Prosthetic (S) Train Initial Encounter, Each 15 Min      38720 (CPT®) Hc Pt Orthotic(S)/Prosthetic(S) Encounter, Each 15 Min      66367 (CPT®) Hc Orthotic(S) Mgmt/Train Initial Encounter, Each 15min      Total  45 3        Therapy Charges for Today     Code Description Service Date Service Provider Modifiers Qty    08965688963 HC PT THERAPEUTIC ACT EA 15 MIN 1/21/2019 Davion Arias, PTA GP 3     [id number]  PT NEG PRESS WOUND TO 50SQCM DME1 1/21/2019 Davion Arias, PTA  3          PT G-Codes  Outcome Measure Options: AM-PAC 6 Clicks Basic Mobility (PT)  AM-PAC 6 Clicks Score: 9  Score: 11    Davion Arias, PTA  1/21/2019

## 2019-01-22 VITALS
SYSTOLIC BLOOD PRESSURE: 117 MMHG | TEMPERATURE: 97.7 F | DIASTOLIC BLOOD PRESSURE: 47 MMHG | RESPIRATION RATE: 18 BRPM | HEIGHT: 65 IN | HEART RATE: 72 BPM | OXYGEN SATURATION: 96 % | WEIGHT: 293 LBS | BODY MASS INDEX: 48.82 KG/M2

## 2019-01-22 LAB
ABO GROUP BLD: NORMAL
ALBUMIN SERPL-MCNC: 2.9 G/DL (ref 3.5–5)
ANION GAP SERPL CALCULATED.3IONS-SCNC: 6.4 MMOL/L (ref 10–20)
BLD GP AB SCN SERPL QL: NEGATIVE
BUN BLD-MCNC: 60 MG/DL (ref 7–20)
BUN/CREAT SERPL: 37.5 (ref 7.1–23.5)
CALCIUM SPEC-SCNC: 8.3 MG/DL (ref 8.4–10.2)
CHLORIDE SERPL-SCNC: 112 MMOL/L (ref 98–107)
CO2 SERPL-SCNC: 26 MMOL/L (ref 26–30)
CREAT BLD-MCNC: 1.6 MG/DL (ref 0.6–1.3)
DEPRECATED RDW RBC AUTO: 68.9 FL (ref 37–54)
ERYTHROCYTE [DISTWIDTH] IN BLOOD BY AUTOMATED COUNT: 21.6 % (ref 11.5–14.5)
GFR SERPL CREATININE-BSD FRML MDRD: 33 ML/MIN/1.73
GFR SERPL CREATININE-BSD FRML MDRD: 40 ML/MIN/1.73
GLUCOSE BLD-MCNC: 287 MG/DL (ref 74–98)
GLUCOSE BLDC GLUCOMTR-MCNC: 287 MG/DL (ref 70–130)
GLUCOSE BLDC GLUCOMTR-MCNC: 293 MG/DL (ref 70–130)
HCT VFR BLD AUTO: 26.4 % (ref 37–47)
HGB BLD-MCNC: 7.7 G/DL (ref 12–16)
MCH RBC QN AUTO: 25.5 PG (ref 27–31)
MCHC RBC AUTO-ENTMCNC: 29.2 G/DL (ref 30–37)
MCV RBC AUTO: 87.4 FL (ref 81–99)
PHOSPHATE SERPL-MCNC: 2.7 MG/DL (ref 2.5–4.5)
PLATELET # BLD AUTO: 223 10*3/MM3 (ref 130–400)
PMV BLD AUTO: 11.4 FL (ref 6–12)
POTASSIUM BLD-SCNC: 5.4 MMOL/L (ref 3.5–5.1)
RBC # BLD AUTO: 3.02 10*6/MM3 (ref 4.2–5.4)
RH BLD: POSITIVE
SODIUM BLD-SCNC: 139 MMOL/L (ref 137–145)
T&S EXPIRATION DATE: NORMAL
WBC NRBC COR # BLD: 9.47 10*3/MM3 (ref 4.8–10.8)

## 2019-01-22 PROCEDURE — 86900 BLOOD TYPING SEROLOGIC ABO: CPT

## 2019-01-22 PROCEDURE — 36430 TRANSFUSION BLD/BLD COMPNT: CPT

## 2019-01-22 PROCEDURE — 82962 GLUCOSE BLOOD TEST: CPT

## 2019-01-22 PROCEDURE — 25010000002 HYDROCORTISONE SODIUM SUCCINATE 100 MG RECONSTITUTED SOLUTION: Performed by: HOSPITALIST

## 2019-01-22 PROCEDURE — 86850 RBC ANTIBODY SCREEN: CPT | Performed by: INTERNAL MEDICINE

## 2019-01-22 PROCEDURE — 99239 HOSP IP/OBS DSCHRG MGMT >30: CPT | Performed by: INTERNAL MEDICINE

## 2019-01-22 PROCEDURE — 63710000001 INSULIN ASPART PER 5 UNITS: Performed by: INTERNAL MEDICINE

## 2019-01-22 PROCEDURE — 86901 BLOOD TYPING SEROLOGIC RH(D): CPT | Performed by: INTERNAL MEDICINE

## 2019-01-22 PROCEDURE — 86900 BLOOD TYPING SEROLOGIC ABO: CPT | Performed by: INTERNAL MEDICINE

## 2019-01-22 PROCEDURE — P9016 RBC LEUKOCYTES REDUCED: HCPCS

## 2019-01-22 PROCEDURE — 25010000002 HEPARIN (PORCINE) PER 1000 UNITS: Performed by: INTERNAL MEDICINE

## 2019-01-22 PROCEDURE — 92526 ORAL FUNCTION THERAPY: CPT

## 2019-01-22 PROCEDURE — 85027 COMPLETE CBC AUTOMATED: CPT | Performed by: INTERNAL MEDICINE

## 2019-01-22 PROCEDURE — 80069 RENAL FUNCTION PANEL: CPT | Performed by: INTERNAL MEDICINE

## 2019-01-22 PROCEDURE — 86920 COMPATIBILITY TEST SPIN: CPT

## 2019-01-22 PROCEDURE — 25010000002 POTASSIUM CHLORIDE PER 2 MEQ OF POTASSIUM: Performed by: INTERNAL MEDICINE

## 2019-01-22 PROCEDURE — 63710000001 INSULIN DETEMIR PER 5 UNITS: Performed by: INTERNAL MEDICINE

## 2019-01-22 RX ORDER — SPIRONOLACTONE 25 MG/1
25 TABLET ORAL DAILY
Status: ON HOLD
Start: 2019-01-23 | End: 2019-04-29

## 2019-01-22 RX ORDER — HYDRALAZINE HYDROCHLORIDE 25 MG/1
25 TABLET, FILM COATED ORAL EVERY 8 HOURS SCHEDULED
Status: ON HOLD
Start: 2019-01-22 | End: 2019-04-29

## 2019-01-22 RX ORDER — GABAPENTIN 300 MG/1
300 CAPSULE ORAL EVERY 12 HOURS SCHEDULED
Qty: 20 CAPSULE | Refills: 0 | Status: SHIPPED | OUTPATIENT
Start: 2019-01-22 | End: 2019-05-03 | Stop reason: HOSPADM

## 2019-01-22 RX ORDER — GABAPENTIN 300 MG/1
300 CAPSULE ORAL EVERY 12 HOURS SCHEDULED
Status: DISCONTINUED | OUTPATIENT
Start: 2019-01-22 | End: 2019-01-22 | Stop reason: HOSPADM

## 2019-01-22 RX ORDER — GABAPENTIN 600 MG/1
300 TABLET ORAL 2 TIMES DAILY
Qty: 30 TABLET | Refills: 0 | Status: SHIPPED | OUTPATIENT
Start: 2019-01-22 | End: 2019-01-22 | Stop reason: HOSPADM

## 2019-01-22 RX ORDER — LEVOTHYROXINE SODIUM 0.1 MG/1
100 TABLET ORAL DAILY
Start: 2019-01-22 | End: 2019-05-03 | Stop reason: HOSPADM

## 2019-01-22 RX ORDER — CARVEDILOL 6.25 MG/1
6.25 TABLET ORAL 2 TIMES DAILY WITH MEALS
Status: ON HOLD
Start: 2019-01-22 | End: 2019-04-29

## 2019-01-22 RX ADMIN — INSULIN ASPART 4 UNITS: 100 INJECTION, SOLUTION INTRAVENOUS; SUBCUTANEOUS at 12:11

## 2019-01-22 RX ADMIN — SPIRONOLACTONE 25 MG: 25 TABLET, FILM COATED ORAL at 09:53

## 2019-01-22 RX ADMIN — CARVEDILOL 6.25 MG: 6.25 TABLET, FILM COATED ORAL at 09:53

## 2019-01-22 RX ADMIN — Medication 1 CAPSULE: at 09:53

## 2019-01-22 RX ADMIN — HYDROCORTISONE SODIUM SUCCINATE 50 MG: 100 INJECTION, POWDER, FOR SOLUTION INTRAMUSCULAR; INTRAVENOUS at 12:11

## 2019-01-22 RX ADMIN — HYDROCORTISONE SODIUM SUCCINATE 50 MG: 100 INJECTION, POWDER, FOR SOLUTION INTRAMUSCULAR; INTRAVENOUS at 01:46

## 2019-01-22 RX ADMIN — HYDRALAZINE HYDROCHLORIDE 25 MG: 25 TABLET ORAL at 06:58

## 2019-01-22 RX ADMIN — INSULIN ASPART 4 UNITS: 100 INJECTION, SOLUTION INTRAVENOUS; SUBCUTANEOUS at 06:58

## 2019-01-22 RX ADMIN — POTASSIUM CHLORIDE 250 ML/HR: 2 INJECTION, SOLUTION, CONCENTRATE INTRAVENOUS at 07:19

## 2019-01-22 RX ADMIN — LEVOTHYROXINE SODIUM 100 MCG: 100 TABLET ORAL at 06:58

## 2019-01-22 RX ADMIN — HYDRALAZINE HYDROCHLORIDE 25 MG: 25 TABLET ORAL at 15:04

## 2019-01-22 RX ADMIN — POTASSIUM CHLORIDE 250 ML/HR: 2 INJECTION, SOLUTION, CONCENTRATE INTRAVENOUS at 02:42

## 2019-01-22 RX ADMIN — HEPARIN SODIUM 5000 UNITS: 5000 INJECTION, SOLUTION INTRAVENOUS; SUBCUTANEOUS at 09:53

## 2019-01-22 RX ADMIN — FAMOTIDINE 20 MG: 10 INJECTION, SOLUTION INTRAVENOUS at 09:53

## 2019-01-22 RX ADMIN — INSULIN DETEMIR 30 UNITS: 100 INJECTION, SOLUTION SUBCUTANEOUS at 06:59

## 2019-01-22 RX ADMIN — SODIUM CHLORIDE, PRESERVATIVE FREE 3 ML: 5 INJECTION INTRAVENOUS at 09:52

## 2019-01-22 NOTE — SIGNIFICANT NOTE
01/22/19 1448   Rehab Treatment   Discipline physical therapy assistant   Reason Treatment Not Performed unavailable for treatment  (Pt recieving blood transfusion at this time and is to be discharged after. )

## 2019-01-22 NOTE — PROGRESS NOTES
Continued Stay Note  [facility]     Patient Name: Millicent Mckeon  MRN: [id number]  Today's Date: 1/22/2019    Admit Date: 1/10/2019    Discharge Plan     Row Name 01/22/19 0920       Plan    Plan  Called to Carla at [facility] and informed plan to discharge today after blood transfusion.  Will need wound vac and Carla is aware.  Will need to fax DCS as soon as available.  Fax dcs to , if after 4pm please fax to   and call report to  [phone].  Will need EMS transport.  Discharged summary faxed.          Discharge Codes    No documentation.       Expected Discharge Date and Time     Expected Discharge Date Expected Discharge Time    Jan 22, 2019             Bernadette Adan

## 2019-01-22 NOTE — DISCHARGE SUMMARY
[facility]   DISCHARGE SUMMARY      Name:  Millicent Mckeon   Age:  58 y.o.  Sex:  female  :  1960  MRN:  [id number]   Visit Number:  [id number]    Admission Date:  1/10/2019  Date of Discharge:  2019  Primary Care Physician:  Marianela Albright APRN    Discharge Diagnoses:     1.  Sepsis with hypothermia, present on admission, resolved.  2.  Bilateral lower extremity cellulitis with ESBL Klebsiella, present on admission, improved.  3.  Acute metabolic encephalopathy, present on admission, improved.  4.  Acute renal failure, present on admission.  5.  Hyperkalemia, present on admission, improved.  6.  Acquired hypothyroidism, uncontrolled.  7.  Right diabetic foot infection status post incision and drainage on 2019.  8.  Diabetes mellitus type 2 with nephropathy.  9.  Morbid obesity with a BMI of 63.  10.  Chronic venous insufficiency of the lower extremities.  11.  Retrocrural, paratracheal, retro-peritoneal, bilateral inguinal lymphadenopathy, uncertain etiology.  12.  Left thyroid nodule 2 cm on recent CT scan.  13.  Thrombocytopenia likely secondary to sepsis, resolved.      Cellulitis of both lower extremities    Anemia    Bilateral edema of lower extremity    Cellulitis of lower extremity    Acute on chronic renal failure (CMS/HCC)    GERD (gastroesophageal reflux disease)    Hyperkalemia    Essential hypertension    Hypothermia    Hypothyroidism    Type 2 diabetes mellitus with complication (CMS/HCC)    Vitamin B12 deficiency    Acute metabolic encephalopathy    Presenting Problem:    Cellulitis of both lower extremities [L03.115, L03.116]     Consults:     Consults     Date and Time Order Name Status Description    2019 1312 Hematology & Oncology Inpatient Consult Completed     2019 0943 Inpatient Podiatry Consult Completed     2019 0717 Inpatient Nephrology Consult Completed         Consulting Physician(s)     Provider Relationship Specialty     Milad Leone MD, GILA Consulting Physician Nephrology    Pedro Theodore MD Consulting Physician Hematology and Oncology    Ar Rueda DPM Consulting Physician Podiatry        Procedures Performed:    1.  Right heel incision and drainage with graft application on 1/14/2019.  2.  Placement of right subclavian central line on 1/15/2019 and subsequent removal.  3.  Transfusion of 1 unit of packed red blood cells.  4.  Wilkinson catheter placement and subsequent removal.  5.  Temporary dialysis.    History of presenting illness/Hospital Course:    Ms. Mckeon was transferred from [facility] emergency room to [facility] on 1/10/2019 with symptoms of generalized weakness, altered mental status, shortness of breath and leg cellulitis.  She was noted to have hypothermia and hypotension and was placed on warming blankets on presentation.  She was placed on broad-spectrum IV antibiotic therapy with Zosyn and vancomycin and a consultation was obtained from Dr. Rueda from podiatry due to significant cellulitis and right heel infection.      She was also noted to have acute renal failure and was consulted by Dr. Leone.  According to the sister Virginia, patient was in her normal state of health about 2 weeks ago since when she started having generalized weakness and worsening leg swelling.  She has morbid obesity and apparently uses a wheelchair.  Patient's recent CT scan of the abdomen and pelvis done at [facility] emergency room noted lymphadenopathy especially in the retroperitoneal and bilateral inguinal regions as well as paratracheal and retrocrural. Our initial CT scan reports done here did not mention these findings but I discussed with our radiologist on 1/14/2019 and he reviewed the scans done here and he stated that he does see the lymph nodes in the above-mentioned areas.  He also stated that the patient has a tiny nodule on the left lobe of the thyroid but he did  not think it was 2 cm in size.  She was seen by Dr. Theodore from oncology on 1/16/2019.  He recommended outpatient follow-up for her lymphadenopathy and possible outpatient lymph node biopsy.      Patient did undergo right foot surgical debridement by Dr. Rueda on 1/14/2019.  Patient was also started on temporary hemodialysis on 1/15/2019.   Due to poor IV access, he has a right subclavian dialysis catheter was placed by Dr. Trujillo.  After foot surgery, patient started improving slowly with regards to her mental status.  Patient was on tube feeds initially which she has tolerated well.  Her NG tube was discontinued on 1/17/2019 and she was seen by speech therapy and they recommended puréed diet with nectar thick liquids.  She was transferred out of the ICU on 1/17/2019.     Patient continued to improve with regards to her mental status as well as her renal function.  She did not have any further dialysis after she was transferred out of ICU.  She was seen by physical and occupational therapy but so far has not been able ambulate due to her right foot surgery.  She was seen by Dr. Rueda who recommended nonweightbearing on the right foot and he recommended wound VAC placement with dressing changes 3 times weekly.  He recommended that the patient may need wound VAC for 2-3 weeks.  Patient was able to sit out of bed in the chair.    Patient was reevaluated by speech therapy due to poor oral intake.  Her diet was changed to mechanical soft diet with thin liquid diet which the patient is tolerating well.  During the hospital stay, she was noted to have uncontrolled hypothyroidism and was started on levothyroxine.    Patient was initially on IV antibiotic therapy with Zosyn for her leg cellulitis.  Her leg cellulitis has significantly improved.  Wound cultures did grow Proteus and ESBL Klebsiella and she was subsequently cyst over to ertapenem.  Patient has completed a course of 10 days of IV antibiotic therapy and she does not  require any further antibiotics.  She was noted to have chronic anemia but did drop her hemoglobin to 7.7 and was transfused with 1 unit of blood transfusion.  Her renal function is progressively improving and her creatinine is down to 1.6.  She will be transferred to short-term rehabilitation when bed is available.  Repeat CBC and BMP to be done in 2-3 days.    Patient to follow-up with Dr. Rueda in 1 week.  She also needs to follow-up with Dr. Theodore as scheduled.  Overall, her long-term prognosis seems to be poor due to multiple comorbidities, morbid obesity as well as poor functional status.    Vital Signs:    Temp:  [97.8 °F (36.6 °C)-98.5 °F (36.9 °C)] 98.3 °F (36.8 °C)  Heart Rate:  [61-73] 64  Resp:  [18] 18  BP: (145-179)/(63-75) 169/73    Physical Exam:    General Appearance:  Alert and cooperative, not in any acute distress.   Head:  Atraumatic and normocephalic, without obvious abnormality.   Eyes:          PERRLA, conjunctivae and sclerae normal, no Icterus. No pallor. Extra-occular movements are within normal limits.   Ears:  Ears appear intact with no abnormalities noted.   Throat: No oral lesions, no thrush, oral mucosa moist.   Neck: Supple, trachea midline, no thyromegaly, no carotid bruit.   Back:   No kyphoscoliosis present. No tenderness to palpation,   range of motion normal.   Lungs:   Chest shape is normal. Breath sounds heard bilaterally equally.  No crackles or wheezing. No Pleural rub or bronchial breathing.   Heart:  Normal S1 and S2, no murmur, no gallop, no rub. No JVD.   Abdomen:   Normal bowel sounds, no masses, no organomegaly. Soft, non-tender, obese with a large pannus with healing ulcers and scabs noted on the abdominal wall. No guarding, no rebound tenderness.   Extremities: Large lower extremities due to obesity with multiple skin folds that are indurated and hyperemic.  Superficial skin ulcers noted on the lateral aspect of the right leg which are healing.  Erythema has  significantly improved since admission.  Surgical bandage is noted on the right foot with wound VAC in place.  Healing ulcer noted on the medial aspect of the right thigh.  Excoriations noted on bilateral legs and feet.   Pulses: Radial pulses palpable and equal bilaterally.   Skin: As described above.  Please see nurse's notes and the photographs in the chart for further details.  Lower extremity redness has significantly improved in the last several days.   Neurologic: Alert and oriented x 3. Moves all four limbs equally but does have generalized weakness. No tremors. No facial asymetry.     Pertinent Lab Results:     Results from last 7 days   Lab Units 01/22/19  0510 01/21/19  0457 01/20/19  0556   SODIUM mmol/L 139 150* 150*   POTASSIUM mmol/L 5.4* 3.5 3.7   CHLORIDE mmol/L 112* 119* 117*   CO2 mmol/L 26.0 26.0 26.0   BUN mg/dL 60* 62* 66*   CREATININE mg/dL 1.60* 1.80* 2.00*   CALCIUM mg/dL 8.3* 8.6 8.7   GLUCOSE mg/dL 287* 159* 205*     Results from last 7 days   Lab Units 01/22/19  0510 01/21/19  0457 01/19/19  0525   WBC 10*3/mm3 9.47 6.80 7.84   HEMOGLOBIN g/dL 7.7* 8.3* 9.0*   HEMATOCRIT % 26.4* 29.1* 30.1*   PLATELETS 10*3/mm3 223 228 209     Wound culture:    Susceptibility      Proteus vulgaris Klebsiella pneumoniae ESBL     ABHILASH ABHILASH     Amikacin <=16 ug/ml Susceptible <=16 ug/ml Susceptible     Ampicillin >16 ug/ml Resistant >16 ug/ml Resistant     Ampicillin + Sulbactam <=8/4 ug/ml Susceptible 16/8 ug/ml Intermediate     Aztreonam <=4 ug/ml Susceptible >16 ug/ml Resistant     Cefazolin >16 ug/ml Resistant >16 ug/ml Resistant     Cefepime <=4 ug/ml Susceptible >16 ug/ml Resistant     Cefotaxime >32 ug/ml Resistant >32 ug/ml Resistant     Cefoxitin <=8 ug/ml Susceptible <=8 ug/ml Susceptible     Ceftazidime <=1 ug/ml Susceptible >16 ug/ml Resistant     Ceftriaxone >32 ug/ml Resistant >32 ug/ml Resistant     Cefuroxime sodium >16 ug/ml Resistant >16 ug/ml Resistant     Ciprofloxacin <=1 ug/ml Susceptible  <=1 ug/ml Susceptible     Doripenem <=0.5 ug/ml Susceptible <=0.5 ug/ml Susceptible     Ertapenem <=1 ug/ml Susceptible <=1 ug/ml Susceptible     Gentamicin <=4 ug/ml Susceptible <=4 ug/ml Susceptible     Levofloxacin <=2 ug/ml Susceptible <=2 ug/ml Susceptible     Meropenem <=1 ug/ml Susceptible <=1 ug/ml Susceptible     Piperacillin + Tazobactam <=16 ug/ml Susceptible <=16 ug/ml Susceptible     Tetracycline >8 ug/ml Resistant >8 ug/ml Resistant     Tigecycline <=1 ug/ml Resistant <=1 ug/ml Susceptible     Tobramycin <=4 ug/ml Susceptible 8 ug/ml Intermediate     Trimethoprim + Sulfamethoxazole >2/38 ug/ml Resistant >2/38 ug/ml Resistant                   Specimen Collected: 01/14/19 13:54 Last Resulted: 01/18/19 08:29        Pertinent Radiology Results:    Imaging Results (all)     Procedure Component Value Units Date/Time    CT Head Without Contrast [[id number]] Collected:  01/15/19 1150     Updated:  01/15/19 1154    Narrative:       CT HEAD WITHOUT CONTRAST-     HISTORY: Altered mental status.     TECHNIQUE: Contiguous axial images were obtained from the skull vertex  to the skull base without administration of contrast.     FINDINGS:   Of note, this exam was performed on 01/11/2019 but never dictated.  Reportedly, no inquiries from the clinical service regarding the report  were made and this exam is submitted to me for interpretation on  01/15/2019.     Per the technologist, the patient refused to keep arms down for exam and  positioning of the arms does result in some streak artifact. Allowing  for this, no hemorrhage, mass effect or midline shift is identified. The  basal cisterns are patent. Ventricles are not enlarged. Minimal areas of  decreased attenuation in the white matter are nonspecific but suggestive  of minimal chronic microvascular ischemic type changes. Minimal vascular  calcifications. Of note, the patient did undergo a repeated head CT on  01/13/2019 which was read as no acute intracranial  process as well.       Impression:       No acute intracranial findings identified. Minimal chronic  appearing changes. If symptoms persist, or if there is concern for  ischemia, consider MRI.        This study was performed with techniques to keep radiation doses as low  as reasonably achievable (ALARA). Individualized dose reduction  techniques using automated exposure control or adjustment of mA and/or  kV according to the patient size were employed.      This report was finalized on 1/15/2019 11:52 AM by Jean Marie Schmitt MD.    XR Chest 1 View [[id number]] Collected:  01/14/19 1150     Updated:  01/14/19 1208    Narrative:       PORTABLE CHEST     INDICATION: Central line placement.     FINDINGS: Single frontal portable chest. There is a right-sided central  venous catheter which terminates near the proximal aspect of the  superior vena cava. Nasogastric tube extends below the diaphragm with  the tip out of the field-of-view but likely in the midstomach. No  pneumothorax. Cardiac silhouette appears mildly prominent. There are  perihilar infiltrates. No definite effusion.       Impression:       1. Findings are suggestive of mild edema.  2. Tubes and lines positioned as described.     This report was finalized on 1/14/2019 12:06 PM by Jean Marie Schmitt MD.    CT Abdomen Pelvis Without Contrast [[id number]] Collected:  01/13/19 1222     Updated:  01/13/19 1227    Narrative:       PROCEDURE: CT ABDOMEN PELVIS WO CONTRAST-     HISTORY: eval adenopathy; hypoactive bowel sounds, eval for ischemic  changes     COMPARISON: None.     PROCEDURE: Axial images were obtained from the lung bases to the pubic  symphysis by computed tomography.     This study was performed with  techniques to keep radiation doses as low as reasonably achievable,  (ALARA). Individualized dose reduction techniques using automated  exposure control or adjustment of mA and/or kV according to the patient  size were employed.     FINDINGS:      ABDOMEN: Small  bilateral pleural effusions with mild adjacent lower lobe  atelectasis noted. The heart is enlarged with vascular congestion. NG  tube present with tip in the stomach. The limited non-contrast images of  the liver are unremarkable. Small amount of perihepatic fluid  identified. Gallbladder is distended with no definite wall thickening or  CT visible stones. The spleen mildly enlarged with no focal abnormality.  No adrenal masses are seen. The aorta is normal in caliber. Vascular  calcifications noted. Shotty periaortic adenopathy identified. There is  no nephrolithiasis. There is no hydronephrosis.         PELVIS: The GI tract demonstrate no obstruction. The appendix is not  identified. The urinary bladder is completely collapsed by Wilkinson  catheter. Uterus is midline. There is a small amount of free fluid in  the pelvis.      Extensive infiltration of the subcutaneous fat of  the abdominal wall  bilaterally, right greater than left identified, edema versus infection.  No bowel wall thickening identified.       Impression:       Small amount of ascites and extensive infiltration of the  subcutaneous fat predominantly of the anterior and lateral abdominal  wall, right greater than left, consider edema versus infection.     Distended gallbladder with no CT visible stones identified.     This report was finalized on 1/13/2019 12:25 PM by Cookie Abarca MD.    CT Chest Without Contrast [[id number]] Collected:  01/13/19 1223     Updated:  01/13/19 1227    Narrative:       PROCEDURE: CT CHEST WO CONTRAST-     HISTORY: altered mental status, change in respiration     COMPARISON: None.     PROCEDURE: Axial images were obtained from the lung apex to the mid  abdomen by computed tomography. This study was performed with techniques  to keep radiation doses as low as reasonably achievable, (ALARA).  Individualized dose reduction techniques using automated exposure  control or adjustment of mA and/or kV according to the patient size  were  employed.     FINDINGS:      CHEST: There is no axillary adenopathy. There is no hilar or mediastinal  adenopathy. The heart is enlarged with vascular congestion. There is no  pericardial effusion. There are small, bilateral pleural effusions with  very mild, adjacent lower lobe atelectasis bilaterally. Limited images  of the upper abdomen are unremarkable. No suspicious infiltrate or  nodule identified. NG tube present with tip in the antrum of the  stomach. Motion artifact present on some images. Infiltration of the  subcutaneous fat present bilaterally more pronounced on the right,  overlying the inferior chest and abdomen.       Impression:       Cardiomegaly with vascular congestion and small bilateral  pleural effusions suggesting mild congestive heart failure.     Infiltration of the subcutaneous fat, more prominent on the right and  extending into the right abdominal wall.     NG tube present.           This report was finalized on 1/13/2019 12:25 PM by Cookie Abarca MD.    CT Head Without Contrast [[id number]] Collected:  01/13/19 1149     Updated:  01/13/19 1154    Narrative:       PROCEDURE: CT HEAD WO CONTRAST-     HISTORY: altered mental status; not moving right side as much, nystagmus     COMPARISON: None.     TECHNIQUE: Multiple axial CT images were performed from the foramen  magnum to the vertex. Individualized dose reduction techniques using  automated exposure control or adjustment of mA and/or kV according to  the patient size were employed.     FINDINGS: The brain parenchyma is unremarkable.  The ventricles are  proper size. There is no evidence of hemorrhage. No masses are  identified. No abnormal extra-axial fluid is seen. The paranasal sinuses  and mastoid air cells are unremarkable. Nasal cannula identified.       Impression:       No acute intracranial process.             This report was finalized on 1/13/2019 11:52 AM by Cookie Abarca MD.    XR Abdomen KUB [[id number]] Collected:   01/12/19 1856     Updated:  01/12/19 1857    Narrative:       FINAL REPORT    CLINICAL HISTORY:  replacement of ng tube    FINDINGS:  The NG tube terminates in the gastric body.  Impression: NG tube  in stomach      Impression:       Authenticated by John Mckeon MD on 01/12/2019 06:56:34 PM    XR Abdomen KUB [[id number]] Collected:  01/12/19 1716     Updated:  01/12/19 1717    Narrative:       FINAL REPORT    CLINICAL HISTORY:  repeat of kub to check ng tube placement    FINDINGS:  The NG tube is looped in the gastric fundus with the tip  terminating in the midesophagus.  Recommend repositioning prior  to use.  Impression: Malpositioned NG tube      Impression:       Authenticated by John Mckeon MD on 01/12/2019 05:16:32 PM    XR Abdomen KUB [[id number]] Collected:  01/12/19 1547     Updated:  01/12/19 1559    Narrative:       PROCEDURE: XR ABDOMEN KUB-     INDICATION:  placement of ng tube     COMPARISON:  None.     FINDINGS:  A supine view of the abdomen was obtained.  The bowel gas  pattern of the visualized left and central portion of the abdomen is  normal.  There are no pathologic calcifications.  No acute osseous  abnormality is identified. NG tube is present. The tip loops back on  itself and is located close in the mid thoracic esophagus, recommend  repositioning.       Impression:       NG tube extends past the GE junction but loops back and  distal tip is in the region of the mid thoracic esophagus, recommend  repositioning.        This report was finalized on 1/12/2019 3:57 PM by Cookie Abarca MD.    US Arterial Doppler Lower Extremity Bilateral [[id number]] Collected:  01/11/19 1203     Updated:  01/11/19 1207    Narrative:       PROCEDURE: US ARTERIAL DOPPLER LOWER EXTREMITY  BILATERAL-     HISTORY: ulcer evaluate flow     PROCEDURE: Segmental pressures with Doppler waveform evaluation of the  lower extremities were performed bilaterally.     FINDINGS:      RIGHT LOWER EXTREMITY.       Velocities cm/sec :      CFA: 115  SFA Mid: 124   SFA Dist: 176  POP: 103  PT: 56  JUAN PABLO: 83  Waveforms are monophasic.           LEFT LOWER EXTREMITY.      Velocities cm/sec :      CFA: 150  SFA Mid: 193   SFA Dist: 153  POP: 47  PT: 54  JUAN PABLO: 56  Waveforms are monophasic.          Impression:       No focal stenosis identified.     This report was finalized on 1/11/2019 12:05 PM by Lety Valiente M.D..    US Venous Doppler Lower Extremity Bilateral (duplex) [[id number]] Collected:  01/11/19 1146     Updated:  01/11/19 1149    Narrative:       PROCEDURE: US VENOUS DOPPLER LOWER EXTREMITY BILATERAL (DUPLEX)-     HISTORY: ulcer evaluate flow     PROCEDURE: Multiple transverse and longitudinal scans were performed of  both femoropopliteal deep venous system, with augmentation and  compression maneuvers.     FINDINGS: Normal phasic flow was noted in the visualized deep venous  system. No intraluminal increased echogenicity is noted to suggest  thrombus. There is normal compression and augmentation of the venous  structures.  No abnormal venous collaterals are seen.       Impression:       No evidence of deep venous thrombosis.     This report was finalized on 1/11/2019 11:47 AM by Lety Valiente M.D..        Condition on Discharge:      Stable.    Code status during the hospital stay:    Code Status and Medical Interventions:   Ordered at: 01/10/19 2045     Code Status:    CPR     Medical Interventions (Level of Support Prior to Arrest):    Full     Discharge Disposition:    Short-term rehabilitation facility.    Discharge Medications:       Discharge Medications      New Medications      Instructions Start Date   carvedilol 6.25 MG tablet  Commonly known as:  COREG   6.25 mg, Oral, 2 Times Daily With Meals      hydrALAZINE 25 MG tablet  Commonly known as:  APRESOLINE   25 mg, Oral, Every 8 Hours Scheduled      levothyroxine 100 MCG tablet  Commonly known as:  SYNTHROID, LEVOTHROID   100 mcg, Oral, Daily       spironolactone 25 MG tablet  Commonly known as:  ALDACTONE   25 mg, Oral, Daily         Changes to Medications      Instructions Start Date   gabapentin 600 MG tablet  Commonly known as:  NEURONTIN  What changed:    · how much to take  · when to take this   300 mg, Oral, 2 Times Daily         Continue These Medications      Instructions Start Date   allopurinol 300 MG tablet  Commonly known as:  ZYLOPRIM   300 mg, Oral, Daily      esomeprazole 40 MG capsule  Commonly known as:  nexIUM   40 mg, Oral, Every Morning Before Breakfast      ferrous sulfate 324 (65 Fe) MG tablet delayed-release EC tablet   324 mg, Oral, 2 Times Daily      Insulin Glargine 100 UNIT/ML injection pen  Commonly known as:  BASAGLAR KWIKPEN   30 Units, Subcutaneous, Nightly      meclizine 25 MG chewable tablet chewable tablet   25 mg, Oral, 3 Times Daily PRN      RA ASPIRIN EC 81 MG EC tablet  Generic drug:  aspirin   81 mg, Oral, Daily      RA VITAMIN C 500 MG tablet  Generic drug:  ascorbic acid   500 mg, Oral, Daily      RA VITAMIN D-3 2000 units capsule  Generic drug:  Cholecalciferol   2,000 Units, Oral, Daily      simvastatin 20 MG tablet  Commonly known as:  ZOCOR   20 mg, Oral, Nightly      TRADJENTA 5 MG tablet tablet  Generic drug:  linagliptin   5 mg, Oral, Daily         Stop These Medications    amLODIPine 5 MG tablet  Commonly known as:  NORVASC     furosemide 20 MG tablet  Commonly known as:  LASIX     glipiZIDE 10 MG tablet  Commonly known as:  GLUCOTROL          Discharge Diet:     Diet Instructions     Diet: Consistent Carbohydrate, Cardiac, Soft Texture; Thin Liquids, No Restrictions; Whole; Thin      Discharge Diet:   Consistent Carbohydrate  Cardiac  Soft Texture       Fluid Consistency:  Thin Liquids, No Restrictions    Soft Options:  Whole    Fluid Consistency:  Thin        Activity at Discharge:     Activity Instructions     Activity as Tolerated          Follow-up Appointments:     Contact information for follow-up  providers     Marianela Albright APRN Follow up in 4 week(s).    Specialty:  Family Medicine  Contact information:  [address] [address]  [phone]             Ar Rueda DPM Follow up in 1 week(s).    Specialty:  Podiatry  Contact information:  [address]  [facility] [address]  [phone]                   Contact information for after-discharge care     Destination     [facility] Follow up.    Service:  Skilled Nursing  Contact information:  [address]  [facility] [address]  [phone]                           Future Appointments   Date Time Provider Department Center   1/30/2019 11:15 AM Pedro Theodore MD E ONC [facility]     Test Results Pending at Discharge:    None.       Samson Reyes MD  01/22/19  11:56 AM    Time spent: 35 min.    Dictated utilizing Dragon dictation.

## 2019-01-22 NOTE — THERAPY DISCHARGE NOTE
Acute Care - Speech Language Pathology /Discharge  [facility]     Patient Name: Millicent Mckeon  : 1960  MRN: [id number]  Today's Date: 2019  Referring Physician: Mohit      Admit Date: 1/10/2019    Visit Dx:     ICD-10-CM ICD-9-CM   1. Altered mental status, unspecified altered mental status type R41.82 780.97   2. Cellulitis of both lower extremities L03.115 682.6    L03.116    3. Impaired functional mobility, balance, gait, and endurance Z74.09 V49.89   4. Impaired mobility and ADLs Z74.09 799.89   5. Dysphagia, oropharyngeal phase R13.12 787.22     Patient Active Problem List   Diagnosis   • Altered mental status   • Anemia   • Bilateral edema of lower extremity   • Cellulitis of lower extremity   • Acute on chronic renal failure (CMS/HCC)   • GERD (gastroesophageal reflux disease)   • Hyperkalemia   • Essential hypertension   • Hypothermia   • Hypothyroidism   • Type 2 diabetes mellitus with complication (CMS/HCC)   • Vitamin B12 deficiency   • Cellulitis of both lower extremities   • Acute metabolic encephalopathy        Therapy Treatment    Rehabilitation Treatment Summary     Row Name 19 1050             Treatment Time/Intention    Discipline  speech language pathologist  -ES      Document Type  therapy note (daily note)  -ES      Subjective Information  complains of;fatigue  -ES      Mode of Treatment  individual therapy  -ES      Care Plan Review  care plan/treatment goals reviewed  -ES      Therapy Frequency (Swallow)  PRN  -ES      Patient Effort  good  -ES      Recorded by [ES] Jesika Braswell, MS CCC-SLP 19 1105      Row Name 19 1050             Pain Scale: Numbers Pre/Post-Treatment    Pain Scale: Numbers, Pretreatment  0/10 - no pain  -ES      Pain Scale: Numbers, Post-Treatment  0/10 - no pain  -ES      Recorded by [ES] Jesika Braswell, MS CCC-SLP 19 1105      Row Name                Wound 01/10/19 1925 Right foot    Wound - Properties Group Date first  assessed: 01/10/19 [CH] Time first assessed: 1925 [CH] Side: Right [CH] Location: foot [CH] Stage, Pressure Injury: deep tissue injury;unstageable [CH] Additional Comments: right heel. [CH2] Recorded by:  [CH] Jacqueline Munroe RN 01/11/19 0215 [CH2] Jacqueline Munroe RN 01/11/19 0225    Row Name                Wound 01/10/19 1925 Right lower;lateral leg abrasion    Wound - Properties Group Date first assessed: 01/10/19 [CH] Time first assessed: 1925 [CH] Side: Right [CH] Orientation: lower;lateral [CH] Location: leg [CH] Type: abrasion [CH] Stage, Pressure Injury: Stage 1 [CH] Recorded by:  [CH] Jacqueline Munroe RN 01/11/19 0217    Row Name                Wound 01/10/19 1925 Left anterior;lower;proximal leg unspecified    Wound - Properties Group Date first assessed: 01/10/19 [CH] Time first assessed: 1925 [CH] Side: Left [CH] Orientation: anterior;lower;proximal [CH] Location: leg [CH] Type: unspecified [CH] Recorded by:  [CH] Jacqueline Munroe RN 01/11/19 0221    Row Name                Wound 01/10/19 1925 Left gluteal abrasion    Wound - Properties Group Date first assessed: 01/10/19 [CH] Time first assessed: 1925 [CH] Present On Admission : yes;picture taken [CH] Side: Left [CH] Location: gluteal [CH] Type: abrasion [CH] Recorded by:  [CH] Jacqueline Munroe RN 01/11/19 0224    Row Name                Wound 01/10/19 1925 Right posterior thigh abrasion    Wound - Properties Group Date first assessed: 01/10/19 [CH] Time first assessed: 1925 [CH] Present On Admission : yes;picture taken [CH] Side: Right [CH] Orientation: posterior [CH] Location: thigh [CH] Type: abrasion [CH] Recorded by:  [CH] Jacqueline Munroe RN 01/11/19 0230    Row Name                Wound 01/10/19 1925    Wound - Properties Group Date first assessed: 01/10/19 [CH] Time first assessed: 1925 [CH] Present On Admission : yes;picture taken [CH] Recorded by:  [CH] Jacqueline Munroe, RN 01/11/19 0309    Row Name                Wound 01/10/19 1925 abdomen    Wound  - Properties Group Date first assessed: 01/10/19 [CH] Time first assessed: 1925 [CH] Present On Admission : yes;picture taken [CH] Location: abdomen [CH] Recorded by:  [CH] Jacqueline Munroe RN 01/11/19 0323    Row Name                Wound 01/20/19 0300 Left lower;lateral chest MASD (moisture associated skin damage);skin tear    Wound - Properties Group Date first assessed: 01/20/19 [DARLENE] Time first assessed: 0300 [DARLENE] Side: Left [DARLENE] Orientation: lower;lateral [DARLENE] Location: chest [DARLENE] Type: MASD (moisture associated skin damage);skin tear [DARLENE] Recorded by:  [DARLENE] Dillan Manley RN 01/20/19 0459    Row Name                Wound 01/20/19 0300 Left proximal calf MASD (moisture associated skin damage);skin tear    Wound - Properties Group Date first assessed: 01/20/19 [DARLENE] Time first assessed: 0300 [DARLENE] Side: Left [DARLENE] Orientation: proximal [DARLENE] Location: calf [DARLENE] Type: MASD (moisture associated skin damage);skin tear [DARLENE] Recorded by:  [DARLENE] Dillan Manley RN 01/20/19 0500    Row Name                NPWT (Negative Pressure Wound Therapy) 01/18/19 0901 R heel    NPWT (Negative Pressure Wound Therapy) - Properties Group Placement Date: 01/18/19 [LM] Placement Time: 0901 [LM] Location: R heel [LM] Recorded by:  [LM] Manuela Gomez, PT 01/18/19 1224    Row Name 01/22/19 1050             Outcome Summary/Treatment Plan (SLP)    Daily Summary of Progress (SLP)  prepare for discharge  -ES      Plan for Continued Treatment (SLP)  discharge today  -ES      Anticipated Dischage Disposition  skilled nursing facility  -ES      Recorded by [ES] Jesika Braswell MS CCC-SLP 01/22/19 1105        User Key  (r) = Recorded By, (t) = Taken By, (c) = Cosigned By    Initials Name Effective Dates Discipline    DARLENE Dillan Manley RN 10/26/16 -  Nurse    Jacqueline Cruz RN 11/07/16 -  Nurse    Manuela Acevedo, PT 04/03/18 -  PT    Jesika Garcia, MS CCC-SLP 04/03/18 -  SLP          Outcome Summary  Outcome Summary/Treatment  Plan (SLP)  Daily Summary of Progress (SLP): prepare for discharge (01/22/19 1050 : Jesika Braswell, MS CCC-SLP)  Plan for Continued Treatment (SLP): discharge today (01/22/19 1050 : Jesika Braswell, MS CCC-SLP)  Anticipated Dischage Disposition: skilled nursing facility (01/22/19 1050 : Jesika Braswell, MS CCC-SLP)  Reason for Discharge: all goals and outcomes met, no further needs identified (01/22/19 1050 : Jesika Braswell, MS CCC-SLP)  SLP GOALS     Row Name 01/22/19 1050 01/21/19 1043          Oral Nutrition/Hydration Goal 1 (SLP)    Oral Nutrition/Hydration Goal 1, SLP  tolerate mech soft diet with thin liquids  -ES  tolerate mech soft diet with thin liquids  -TM     Time Frame (Oral Nutrition/Hydration Goal 1, SLP)  --  1 day  -TM     Barriers (Oral Nutrition/Hydration Goal 1, SLP)  --  medical complexity  -TM     Progress/Outcomes (Oral Nutrition/Hydration Goal 1, SLP)  goal met  -ES  continuing progress toward goal  -TM        Labial Strengthening Goal 1 (SLP)    Activity (Labial Strengthening Goal 1, SLP)  increase labial tone  -ES  increase labial tone  -TM     Increase Labial Tone  --  labial resistance exercises;swallow trials  -TM     [place]/Accuracy (Labial Strengthening Goal 1, SLP)  --  independently (over 90% accuracy)  -TM     Time Frame (Labial Strengthening Goal 1, SLP)  --  by discharge  -TM     Barriers (Labial Strengthening Goal 1, SLP)  --  medical complexity  -TM     Progress/Outcomes (Labial Strengthening Goal 1, SLP)  goal met no difficulty w/ PO trials  -ES  goal ongoing;continuing progress toward goal  -TM        Lingual Strengthening Goal 1 (SLP)    Activity (Lingual Strengthening Goal 1, SLP)  increase lingual tone/sensation/control/coordination/movement;increase tongue back strength  -ES  increase lingual tone/sensation/control/coordination/movement;increase tongue back strength  -TM     Increase Lingual Tone/Sensation/Control/Coordination/Movement  --  lingual  movement exercises;swallow trials;lingual resistance exercises  -TM     Increase Tongue Back Strength  --  lingual movement exercises;swallow trials;lingual resistance exercises  -TM     [place]/Accuracy (Lingual Strengthening Goal 1, SLP)  --  independently (over 90% accuracy)  -TM     Time Frame (Lingual Strengthening Goal 1, SLP)  --  by discharge  -TM     Barriers (Lingual Strengthening Goal 1, SLP)  --  medical complexity  -TM     Progress/Outcomes (Lingual Strengthening Goal 1, SLP)  goal met mild diff. w/ regular trial;reports no difficulty current  -ES  goal ongoing;continuing progress toward goal  -TM        Pharyngeal Strengthening Exercise Goal 1 (SLP)    Activity (Pharyngeal Strengthening Goal 1, SLP)  increase timing thin liquid trials  -ES  --     Progress/Outcomes (Pharyngeal Strengthening Goal 1, SLP)  goal met no difficulty consecutive drinks thin  -ES  --       User Key  (r) = Recorded By, (t) = Taken By, (c) = Cosigned By    Initials Name Provider Type    TM Bhavani Wood Speech and Language Pathologist    Jesika Garcia MS CCC-SLP Speech and Language Pathologist          EDUCATION  The patient has been educated in the following areas:   Dysphagia (Swallowing Impairment).    SLP Recommendation and Plan        Swallow Criteria for Skilled Therapeutic Interventions Met: demonstrates skilled criteria  Anticipated Dischage Disposition: skilled nursing facility     Therapy Frequency (Swallow): PRN  Predicted Duration Therapy Intervention (Days): until discharge      Plan of Care Reviewed With: patient  Plan of Care Review  Plan of Care Reviewed With: patient  Daily Summary of Progress (SLP): prepare for discharge  Plan for Continued Treatment (SLP): discharge today  Outcome Summary: All goals met, discharge from          SLP Outcome Measures (last 72 hours)      SLP Outcome Measures     Row Name 01/22/19 3533             SLP Outcome Measures    Outcome Measure Used?  Adult NOMS  -ES       SLP Diagnoses  Dysphagia  -ES         Adult FCM Scores    FCM Chosen  Swallowing  -ES      Swallowing FCM Score  4  -ES        User Key  (r) = Recorded By, (t) = Taken By, (c) = Cosigned By    Initials Name Effective Dates    Jesika Garcia MS CCC-SLP 04/03/18 -             Time Calculation:   Time Calculation- SLP     Row Name 01/22/19 1113             Time Calculation- SLP    SLP Start Time  1050  -ES        User Key  (r) = Recorded By, (t) = Taken By, (c) = Cosigned By    Initials Name Provider Type    Jesika Garcia MS CCC-SLP Speech and Language Pathologist          Therapy Charges for Today     Code Description Service Date Service Provider Modifiers Qty    [id number] HC ST TREATMENT SWALLOW 1 1/22/2019 Jesika Braswell MS CCC-SLP GN 1               SLP Discharge Summary  Anticipated Dischage Disposition: skilled nursing facility  Reason for Discharge: all goals and outcomes met, no further needs identified  Progress Toward Achieving Short/long Term Goals: all goals met within established timelines    MS DINESH AponteSLP  1/22/2019

## 2019-01-22 NOTE — PROGRESS NOTES
"Nephrology Progress Note.    LOS: 12 days    Patient Care Team:  Marianela Albright APRN as PCP - General (Family Medicine)  Geremias Shepherd MD as Consulting Physician (General Surgery)    Chief Complaint:  No chief complaint on file.      Subjective     Follow up for FREDDY and related issues.    Interval History:     Patient Complaints: none    Patient seen and examined this morning.  Events from last night noted.  She is awake alert and interactive this morning, she is excited about going out of the hospital.  Her diet has been changed she starting to eat some.  Podiatry note was reviewed.    Review of Systems:    No meaningful review of system possible.      Objective     Vital Signs  /66 (BP Location: Left arm, Patient Position: Lying)   Pulse 71   Temp 97.8 °F (36.6 °C) (Axillary)   Resp 18   Ht 165.1 cm (65\")   Wt (!) 144 kg (318 lb 6.4 oz)   SpO2 94%   BMI 52.98 kg/m²       I/O this shift:  In: 1000 [I.V.:1000]  Out: -     Intake/Output Summary (Last 24 hours) at 1/22/2019 0820  Last data filed at 1/22/2019 0719  Gross per 24 hour   Intake 3120 ml   Output 550 ml   Net 2570 ml       Physical Exam:    General Appearance:  no acute distress,   HEENT: Oral mucosa dry, extra occular movements intact. Sclera clear.  Skin: Warm and dry  Neck: supple, no JVD, trachea midline  Lungs:Chest shape is normal. Breath sounds heard bilaterally equally. No crackles, No wheezing.   Heart: regular rate and rhythm. normal S1 and S2, no S3, no rub, peripheral pulses weak but palpable.  Abdomen: Obese, soft, non-tender,  present bowel sounds to auscultation  : no palpable bladder.  Extremities: 1 edema, no cyanosis or clubbing.   Neuro: She does have interaction and move her extremities randomly.     Results Review:    Results from last 7 days   Lab Units 01/22/19  0510 01/21/19  0457 01/20/19  0556   SODIUM mmol/L 139 150* 150*   POTASSIUM mmol/L 5.4* 3.5 3.7   CHLORIDE mmol/L 112* 119* 117*   CO2 mmol/L 26.0 26.0 26.0 "   BUN mg/dL 60* 62* 66*   CREATININE mg/dL 1.60* 1.80* 2.00*   CALCIUM mg/dL 8.3* 8.6 8.7   GLUCOSE mg/dL 287* 159* 205*       Estimated Creatinine Clearance: 54.2 mL/min (A) (by C-G formula based on SCr of 1.6 mg/dL (H)).    Results from last 7 days   Lab Units 01/22/19  0510 01/21/19  0457 01/16/19  0537   MAGNESIUM mg/dL  --   --  2.5*   PHOSPHORUS mg/dL 2.7 3.6  --              Results from last 7 days   Lab Units 01/22/19  0510 01/21/19  0457 01/19/19  0525 01/18/19  0515 01/17/19  0428   WBC 10*3/mm3 9.47 6.80 7.84 7.61 11.00*   HEMOGLOBIN g/dL 7.7* 8.3* 9.0* 8.4* 8.2*   PLATELETS 10*3/mm3 223 228 209 139 108*               Imaging Results (last 24 hours)     ** No results found for the last 24 hours. **          carvedilol 6.25 mg Oral BID With Meals   famotidine 20 mg Intravenous Daily   heparin (porcine) 5,000 Units Subcutaneous Q12H   hydrALAZINE 25 mg Nasogastric Q8H   hydrocortisone sodium succinate 50 mg Intravenous Q12H   insulin aspart 0-7 Units Subcutaneous 4x Daily AC & at Bedtime   insulin detemir 30 Units Subcutaneous QAM   lactobacillus acidophilus 1 capsule Nasogastric Daily   levothyroxine 100 mcg Nasogastric Q AM   sodium chloride 3 mL Intravenous Q12H   spironolactone 25 mg Oral Daily       sodium chloride 10 mL/hr Last Rate: 10 mL/hr (01/20/19 2216)       Medication Review:   Current Facility-Administered Medications   Medication Dose Route Frequency Provider Last Rate Last Dose   • acetaminophen (TYLENOL) tablet 650 mg  650 mg Oral Q4H PRN Milad Leone MD, FASN       • aluminum-magnesium hydroxide-simethicone (MAALOX MAX) 400-400-40 MG/5ML suspension 15 mL  15 mL Oral Q6H PRN Samson Reyes MD   15 mL at 01/19/19 0458   • carvedilol (COREG) tablet 6.25 mg  6.25 mg Oral BID With Meals Samson Reyes MD   6.25 mg at 01/21/19 1711   • CHAPSTICK 1 each  1 each Apply externally Q1H PRN Samson Reyes MD   1 each at 01/16/19 1155   • dextrose (D50W) 25 g/ 50mL Intravenous Solution 25 g  25 g  Intravenous Q15 Min PRN Milad Leone MD, SHANIN       • dextrose (GLUTOSE) oral gel 1 tube  1 tube Oral Q15 Min PRN Milad Leone MD, GILA       • famotidine (PEPCID) injection 20 mg  20 mg Intravenous Daily Liz Gregg MD   20 mg at 01/21/19 0907   • glucagon (human recombinant) (GLUCAGEN DIAGNOSTIC) injection 1 mg  1 mg Subcutaneous PRN Milad Leone MD, GILA       • glycerin 35 % liquid 35% 2 spray  2 spray Mouth/Throat Q2H PRN Maribel Wheeler DO   2 spray at 01/16/19 0857   • heparin (porcine) 5000 UNIT/ML injection 5,000 Units  5,000 Units Subcutaneous Q12H Samson Reyes MD   5,000 Units at 01/21/19 2216   • heparin (porcine) injection 2,400 Units  2,400 Units Intracatheter PRN Milad Leone MD, FASN   2,400 Units at 01/20/19 2055   • hydrALAZINE (APRESOLINE) injection 10 mg  10 mg Intravenous Q6H PRN Liz Gregg MD   10 mg at 01/20/19 0856   • hydrALAZINE (APRESOLINE) tablet 25 mg  25 mg Nasogastric Q8H Samson Reyes MD   25 mg at 01/22/19 0658   • hydrocortisone sodium succinate (Solu-CORTEF) injection 50 mg  50 mg Intravenous Q12H Liz Gregg MD   50 mg at 01/22/19 0146   • insulin aspart (novoLOG) injection 0-7 Units  0-7 Units Subcutaneous 4x Daily AC & at Bedtime Samson Reyes MD   4 Units at 01/22/19 0658   • insulin detemir (LEVEMIR) injection 30 Units  30 Units Subcutaneous QAM Samson Reyes MD   30 Units at 01/22/19 0659   • lactobacillus acidophilus (RISAQUAD) capsule 1 capsule  1 capsule Nasogastric Daily Samson Reyes MD   1 capsule at 01/21/19 0907   • levothyroxine (SYNTHROID, LEVOTHROID) tablet 100 mcg  100 mcg Nasogastric Q AM Samson Reyes MD   100 mcg at 01/22/19 0658   • LORazepam (ATIVAN) injection 0.5 mg  0.5 mg Intravenous Q6H PRN Samson Reyes MD   0.5 mg at 01/20/19 1411   • naloxone (NARCAN) injection 0.4 mg  0.4 mg Intravenous Q5 Min PRN Milad Leone MD, SHANIN       • ondansetron (ZOFRAN) tablet 4 mg  4 mg Oral Q6H PRN Milad Leone MD, SHANIN         Or   • ondansetron ODT (ZOFRAN-ODT) disintegrating tablet 4 mg  4 mg Oral Q6H PRN Milad Leone MD, FASN        Or   • ondansetron (ZOFRAN) injection 4 mg  4 mg Intravenous Q6H PRN Milad Leone MD, FASN   4 mg at 01/19/19 1659   • promethazine (PHENERGAN) injection 12.5 mg  12.5 mg Intravenous Q6H PRN Samson Reyes MD   12.5 mg at 01/19/19 2041   • sodium chloride 0.9 % flush 1-10 mL  1-10 mL Intravenous PRN Milad Leone MD, FASN   10 mL at 01/20/19 0053   • sodium chloride 0.9 % flush 3 mL  3 mL Intravenous Q12H Milad Leone MD, FASN   3 mL at 01/21/19 2222   • Sodium chloride 0.9 % infusion  10 mL/hr Intravenous Continuous Milad Leone MD, FASN 10 mL/hr at 01/20/19 2216 10 mL/hr at 01/20/19 2216   • spironolactone (ALDACTONE) tablet 25 mg  25 mg Oral Daily Milad Leone MD, FASN   25 mg at 01/21/19 0906   • zinc oxide 13 % cream   Topical BID PRN Milad Leone MD, FASN           Assessment/Plan         1.   Acute on chronic renal failure (CMS/[facility]): It is likely secondary to hypotensive episodes as well as the use of Bactrim.    2.   Cellulitis of both lower extremities: Continue with the IV antibiotics.  She likely has an abscess on the foot that needs to be addressed  3.   Altered mental status: There is some improvement in her mental status.  4.   Anemia: Transfused as needed  5.   Bilateral edema of lower extremity:  she needs a central line placed the temporary dialysis catheter and use it as a central line and likely will need dialysis in the morning.  6.   GERD (gastroesophageal reflux disease)  7.   Hyperkalemia:  it is resolved at this point  8.   Hypertension: Under fair control  9.   Hypothermia  10.   Hypothyroidism  11.   Type 2 diabetes mellitus with complication (CMS/[facility])  12.   Vitamin B12 deficiency    Plan:    · Dialysis access can be removed once she does not need the central line access.  Nursing staff can do it.  · Hypernatremia resolved.  · High potassium noted, slightly  high because of given IV, it likely will go back to normal tomorrow.  · Have to reassess her volume status and likely will need some form of diuretics.  Hold off any loop diuretics at this point.  · Blood pressure is fairly stable with the hydralazine can be continued.  · Should be okay to discharge to the rehabilitation from renal standpoint whenever rest of the team feels that she can go.  · Details were also discussed with the hospitalist service.    · Surveillance labs.  · Further recommendations will depend on clinical course of the patient during the current hospitalization.    · I also discussed the details with the nursing staff.  · Rest as ordered.    Milad Leone MD, FASN  01/22/19  8:20 AM    Dictated utilizing Dragon dictation.

## 2019-01-22 NOTE — PLAN OF CARE
Problem: Patient Care Overview  Goal: Plan of Care Review  Outcome: Ongoing (interventions implemented as appropriate)   01/22/19 1050   Coping/Psychosocial   Plan of Care Reviewed With patient   OTHER   Outcome Summary All goals met, discharge from

## 2019-01-22 NOTE — SIGNIFICANT NOTE
01/22/19 1026   Rehab Treatment   Discipline occupational therapist   Reason Treatment Not Performed unavailable for treatment  (Pt getting blood this morning and is being d/c to [facility])

## 2019-01-23 DIAGNOSIS — E11.42 DIABETIC POLYNEUROPATHY ASSOCIATED WITH TYPE 2 DIABETES MELLITUS (HCC): Primary | ICD-10-CM

## 2019-01-23 LAB
ABO + RH BLD: NORMAL
BH BB BLOOD EXPIRATION DATE: NORMAL
BH BB BLOOD TYPE BARCODE: 8400
BH BB DISPENSE STATUS: NORMAL
BH BB PRODUCT CODE: NORMAL
BH BB UNIT NUMBER: NORMAL
CROSSMATCH INTERPRETATION: NORMAL
UNIT  ABO: NORMAL
UNIT  RH: NORMAL

## 2019-01-23 RX ORDER — GABAPENTIN 300 MG/1
300 CAPSULE ORAL 2 TIMES DAILY
Qty: 60 CAPSULE | Refills: 5 | Status: SHIPPED | OUTPATIENT
Start: 2019-01-23 | End: 2019-03-14 | Stop reason: SDUPTHER

## 2019-01-24 ENCOUNTER — HOSPITAL ENCOUNTER (OUTPATIENT)
Facility: HOSPITAL | Age: 59
Discharge: HOME | End: 2019-01-24
Payer: COMMERCIAL

## 2019-01-24 LAB
ANION GAP SERPL CALCULATED.3IONS-SCNC: 9 MMOL/L (ref 3–16)
BASOPHILS ABSOLUTE: 0 K/UL (ref 0–0.1)
BASOPHILS RELATIVE PERCENT: 0.1 %
BUN BLDV-MCNC: 60 MG/DL (ref 6–20)
CALCIUM SERPL-MCNC: 8.2 MG/DL (ref 8.5–10.5)
CHLORIDE BLD-SCNC: 110 MMOL/L (ref 98–107)
CO2: 23 MMOL/L (ref 20–30)
CREAT SERPL-MCNC: 1.9 MG/DL (ref 0.4–1.2)
EOSINOPHILS ABSOLUTE: 0.5 K/UL (ref 0–0.4)
EOSINOPHILS RELATIVE PERCENT: 5.6 %
GFR AFRICAN AMERICAN: 33
GFR NON-AFRICAN AMERICAN: 27
GLUCOSE BLD-MCNC: 264 MG/DL (ref 74–106)
HCT VFR BLD CALC: 29.1 % (ref 37–47)
HEMOGLOBIN: 8.3 G/DL (ref 11.5–16.5)
IMMATURE GRANULOCYTES #: 0.1 K/UL
IMMATURE GRANULOCYTES %: 0.6 % (ref 0–5)
LYMPHOCYTES ABSOLUTE: 1.1 K/UL (ref 1.5–4)
LYMPHOCYTES RELATIVE PERCENT: 13.6 %
MCH RBC QN AUTO: 25.8 PG (ref 27–32)
MCHC RBC AUTO-ENTMCNC: 28.5 G/DL (ref 31–35)
MCV RBC AUTO: 90.4 FL (ref 80–100)
MONOCYTES ABSOLUTE: 0.6 K/UL (ref 0.2–0.8)
MONOCYTES RELATIVE PERCENT: 6.9 %
NEUTROPHILS ABSOLUTE: 6 K/UL (ref 2–7.5)
NEUTROPHILS RELATIVE PERCENT: 73.2 %
PDW BLD-RTO: 21.4 % (ref 11–16)
PLATELET # BLD: 199 K/UL (ref 150–400)
PMV BLD AUTO: 11.3 FL (ref 6–10)
POTASSIUM SERPL-SCNC: 5.7 MMOL/L (ref 3.4–5.1)
RBC # BLD: 3.22 M/UL (ref 3.8–5.8)
SODIUM BLD-SCNC: 142 MMOL/L (ref 136–145)
WBC # BLD: 8.3 K/UL (ref 4–11)

## 2019-01-24 PROCEDURE — 85025 COMPLETE CBC W/AUTO DIFF WBC: CPT

## 2019-01-24 PROCEDURE — 80048 BASIC METABOLIC PNL TOTAL CA: CPT

## 2019-01-28 ENCOUNTER — HOSPITAL ENCOUNTER (OUTPATIENT)
Facility: HOSPITAL | Age: 59
Discharge: HOME | End: 2019-01-28
Payer: COMMERCIAL

## 2019-01-28 ENCOUNTER — OFFICE VISIT (OUTPATIENT)
Dept: PRIMARY CARE CLINIC | Age: 59
End: 2019-01-28
Payer: MEDICARE

## 2019-01-28 DIAGNOSIS — E11.8 TYPE 2 DIABETES MELLITUS WITH COMPLICATION, WITH LONG-TERM CURRENT USE OF INSULIN (HCC): ICD-10-CM

## 2019-01-28 DIAGNOSIS — L97.519 ULCER OF RIGHT FOOT, UNSPECIFIED ULCER STAGE (HCC): ICD-10-CM

## 2019-01-28 DIAGNOSIS — N18.9 CHRONIC KIDNEY DISEASE, UNSPECIFIED CKD STAGE: ICD-10-CM

## 2019-01-28 DIAGNOSIS — Z79.4 TYPE 2 DIABETES MELLITUS WITH COMPLICATION, WITH LONG-TERM CURRENT USE OF INSULIN (HCC): ICD-10-CM

## 2019-01-28 DIAGNOSIS — R59.1 LYMPHADENOPATHY: ICD-10-CM

## 2019-01-28 DIAGNOSIS — E66.01 MORBID OBESITY (HCC): ICD-10-CM

## 2019-01-28 DIAGNOSIS — E03.9 HYPOTHYROIDISM, UNSPECIFIED TYPE: ICD-10-CM

## 2019-01-28 DIAGNOSIS — R53.81 DECLINING FUNCTIONAL STATUS: Primary | ICD-10-CM

## 2019-01-28 DIAGNOSIS — D64.9 ANEMIA, UNSPECIFIED TYPE: ICD-10-CM

## 2019-01-28 LAB
A/G RATIO: 1.1 (ref 0.8–2)
ALBUMIN SERPL-MCNC: 3 G/DL (ref 3.4–4.8)
ALP BLD-CCNC: 151 U/L (ref 25–100)
ALT SERPL-CCNC: 16 U/L (ref 4–36)
ANION GAP SERPL CALCULATED.3IONS-SCNC: 11 MMOL/L (ref 3–16)
AST SERPL-CCNC: 12 U/L (ref 8–33)
BILIRUB SERPL-MCNC: 0.3 MG/DL (ref 0.3–1.2)
BUN BLDV-MCNC: 58 MG/DL (ref 6–20)
CALCIUM SERPL-MCNC: 8 MG/DL (ref 8.5–10.5)
CHLORIDE BLD-SCNC: 110 MMOL/L (ref 98–107)
CO2: 20 MMOL/L (ref 20–30)
CREAT SERPL-MCNC: 1.8 MG/DL (ref 0.4–1.2)
FERRITIN: 427.3 NG/ML (ref 22–322)
GFR AFRICAN AMERICAN: 35
GFR NON-AFRICAN AMERICAN: 29
GLOBULIN: 2.7 G/DL
GLUCOSE BLD-MCNC: 119 MG/DL (ref 74–106)
IRON: 18 UG/DL (ref 37–145)
POTASSIUM SERPL-SCNC: 5.9 MMOL/L (ref 3.4–5.1)
REASON FOR REJECTION: NORMAL
REJECTED TEST: NORMAL
SODIUM BLD-SCNC: 141 MMOL/L (ref 136–145)
TOTAL IRON BINDING CAPACITY: 262 UG/DL (ref 250–450)
TOTAL PROTEIN: 5.7 G/DL (ref 6.4–8.3)

## 2019-01-28 PROCEDURE — 83540 ASSAY OF IRON: CPT

## 2019-01-28 PROCEDURE — 83550 IRON BINDING TEST: CPT

## 2019-01-28 PROCEDURE — 99305 1ST NF CARE MODERATE MDM 35: CPT | Performed by: INTERNAL MEDICINE

## 2019-01-28 PROCEDURE — 80053 COMPREHEN METABOLIC PANEL: CPT

## 2019-01-28 PROCEDURE — 82728 ASSAY OF FERRITIN: CPT

## 2019-01-28 PROCEDURE — 3046F HEMOGLOBIN A1C LEVEL >9.0%: CPT | Performed by: INTERNAL MEDICINE

## 2019-01-28 PROCEDURE — G8482 FLU IMMUNIZE ORDER/ADMIN: HCPCS | Performed by: INTERNAL MEDICINE

## 2019-01-28 PROCEDURE — 3017F COLORECTAL CA SCREEN DOC REV: CPT | Performed by: INTERNAL MEDICINE

## 2019-01-29 ENCOUNTER — HOSPITAL ENCOUNTER (OUTPATIENT)
Facility: HOSPITAL | Age: 59
Discharge: HOME | End: 2019-01-29
Payer: MEDICARE

## 2019-01-29 LAB
BASOPHILS ABSOLUTE: 0 K/UL (ref 0–0.1)
BASOPHILS RELATIVE PERCENT: 0.3 %
EOSINOPHILS ABSOLUTE: 0.2 K/UL (ref 0–0.4)
EOSINOPHILS RELATIVE PERCENT: 2.4 %
HCT VFR BLD CALC: 27.4 % (ref 37–47)
HEMOGLOBIN: 8 G/DL (ref 11.5–16.5)
IMMATURE GRANULOCYTES #: 0 K/UL
IMMATURE GRANULOCYTES %: 0.6 % (ref 0–5)
LYMPHOCYTES ABSOLUTE: 0.4 K/UL (ref 1.5–4)
LYMPHOCYTES RELATIVE PERCENT: 6.5 %
MCH RBC QN AUTO: 25.7 PG (ref 27–32)
MCHC RBC AUTO-ENTMCNC: 29.2 G/DL (ref 31–35)
MCV RBC AUTO: 88.1 FL (ref 80–100)
MONOCYTES ABSOLUTE: 0.5 K/UL (ref 0.2–0.8)
MONOCYTES RELATIVE PERCENT: 7.1 %
NEUTROPHILS ABSOLUTE: 5.6 K/UL (ref 2–7.5)
NEUTROPHILS RELATIVE PERCENT: 83.1 %
PDW BLD-RTO: 21.2 % (ref 11–16)
PLATELET # BLD: 97 K/UL (ref 150–400)
RBC # BLD: 3.11 M/UL (ref 3.8–5.8)
WBC # BLD: 6.8 K/UL (ref 4–11)

## 2019-01-29 PROCEDURE — 85025 COMPLETE CBC W/AUTO DIFF WBC: CPT

## 2019-01-30 ENCOUNTER — OFFICE VISIT (OUTPATIENT)
Dept: ONCOLOGY | Facility: CLINIC | Age: 59
End: 2019-01-30

## 2019-01-30 ENCOUNTER — OFFICE VISIT (OUTPATIENT)
Dept: ORTHOPEDIC SURGERY | Facility: CLINIC | Age: 59
End: 2019-01-30

## 2019-01-30 VITALS — BODY MASS INDEX: 48.82 KG/M2 | HEIGHT: 65 IN | WEIGHT: 293 LBS | RESPIRATION RATE: 18 BRPM

## 2019-01-30 VITALS
WEIGHT: 293 LBS | HEART RATE: 54 BPM | SYSTOLIC BLOOD PRESSURE: 147 MMHG | HEIGHT: 65 IN | BODY MASS INDEX: 48.82 KG/M2 | DIASTOLIC BLOOD PRESSURE: 66 MMHG | TEMPERATURE: 97.2 F | RESPIRATION RATE: 17 BRPM

## 2019-01-30 DIAGNOSIS — E11.42 DIABETIC POLYNEUROPATHY ASSOCIATED WITH TYPE 2 DIABETES MELLITUS (HCC): ICD-10-CM

## 2019-01-30 DIAGNOSIS — D63.1 ANEMIA DUE TO STAGE 4 CHRONIC KIDNEY DISEASE (HCC): Primary | ICD-10-CM

## 2019-01-30 DIAGNOSIS — L97.509 TYPE 2 DIABETES MELLITUS WITH FOOT ULCER, WITH LONG-TERM CURRENT USE OF INSULIN (HCC): Primary | ICD-10-CM

## 2019-01-30 DIAGNOSIS — N18.4 ANEMIA DUE TO STAGE 4 CHRONIC KIDNEY DISEASE (HCC): Primary | ICD-10-CM

## 2019-01-30 DIAGNOSIS — Z79.4 TYPE 2 DIABETES MELLITUS WITH FOOT ULCER, WITH LONG-TERM CURRENT USE OF INSULIN (HCC): Primary | ICD-10-CM

## 2019-01-30 DIAGNOSIS — I89.0 LYMPHEDEMA: ICD-10-CM

## 2019-01-30 DIAGNOSIS — E11.621 TYPE 2 DIABETES MELLITUS WITH FOOT ULCER, WITH LONG-TERM CURRENT USE OF INSULIN (HCC): Primary | ICD-10-CM

## 2019-01-30 DIAGNOSIS — L89.610 DECUBITUS ULCER, HEEL, RIGHT, UNSTAGEABLE (HCC): ICD-10-CM

## 2019-01-30 DIAGNOSIS — R60.0 EDEMA OF EXTREMITY OF UNKNOWN CAUSE: ICD-10-CM

## 2019-01-30 PROBLEM — D64.9 ANEMIA: Status: RESOLVED | Noted: 2018-10-02 | Resolved: 2019-01-30

## 2019-01-30 PROCEDURE — 99214 OFFICE O/P EST MOD 30 MIN: CPT | Performed by: INTERNAL MEDICINE

## 2019-01-30 PROCEDURE — 11042 DBRDMT SUBQ TIS 1ST 20SQCM/<: CPT | Performed by: PODIATRIST

## 2019-01-30 RX ORDER — FUROSEMIDE 20 MG/1
TABLET ORAL
Refills: 0 | Status: ON HOLD | COMMUNITY
Start: 2019-01-22 | End: 2019-04-29

## 2019-01-30 RX ORDER — AMLODIPINE BESYLATE 5 MG/1
TABLET ORAL
Refills: 0 | Status: ON HOLD | COMMUNITY
Start: 2019-01-22 | End: 2019-04-29

## 2019-01-30 NOTE — PROGRESS NOTES
Subjective   Patient ID: Millicent Mckeon is a 58 y.o. female she comes back in the office today for her first follow-up visit status post discharge from the hospital. Comes in via ems today on a stretcher without any help.  Denies complaints, residing at NH in [place].      History of Present Illness                                                   Review of Systems   Constitutional: Negative.    Respiratory: Negative.    Gastrointestinal: Negative.    Musculoskeletal: Positive for arthralgias.   Skin: Positive for wound (right heel).       Past Medical History:   Diagnosis Date   • Diabetes mellitus (CMS/HCC)    • Disease of thyroid gland    • GERD (gastroesophageal reflux disease)    • Hypertension         Past Surgical History:   Procedure Laterality Date   • EYE SURGERY     • INCISION AND DRAINAGE LEG Right 1/14/2019    Procedure: Right heel incision and drainage with graft application;  Surgeon: Ar Rueda DPM;  Location: [facility];  Service: Podiatry   • LEG SURGERY         Social History     Socioeconomic History   • Marital status: Single     Spouse name: Not on file   • Number of children: Not on file   • Years of education: Not on file   • Highest education level: Not on file   Social Needs   • Financial resource strain: Not on file   • Food insecurity - worry: Not on file   • Food insecurity - inability: Not on file   • Transportation needs - medical: Not on file   • Transportation needs - non-medical: Not on file   Occupational History   • Not on file   Tobacco Use   • Smoking status: Never Smoker   • Smokeless tobacco: Never Used   Substance and Sexual Activity   • Alcohol use: No     Frequency: Never   • Drug use: No   • Sexual activity: Defer   Other Topics Concern   • Not on file   Social History Narrative   • Not on file       Counseling given: No      I have reviewed all of the above social hx, family hx, surgical hx, medications, allergies & ROS and confirm that it is accurate.    Allergies    Allergen Reactions   • Metformin And Related Swelling     HA, diarrhea, throat swelling       Objective   Physical Exam  Ortho Exam  [unfilled]    Exam of the right lower extremity shows the wound is stable.  It's fairly fibrotic and the base with a large amount of dense yellow thick fibrotic tissue.  The primary wound is dry but the skin is hyperkeratotic and peeling and flaking.  There is no drainage or odor from the wound.  Still has fairly significant depth but no visible bone and does not probe to bone.  Measures 3.5 x 2.5 with about a centimeter of depth.    Assessment/Plan  diabetes, neuropathy, morbid obesity, lymphedema, venous insufficiency, right heel wound  Independent Review of Radiographic Studies:      Laboratory and Other Studies:     Medical Decision Making:        Procedures  Debridement Note    Location of debridement: Right posterolateral heel  Description of procedure: excisional  Type of instrument used: scissors, scalpel and currette  Type of tissue removed: necrotic, devitalized and non-viable  Appearance and size of the wound: down to fresh bleeding tissue  Depth of debridement: subcutaneous tissue    Wound measures 3.5 x 2.5  I sharply and excisionally debrided the wounds down to healthy bleeding tissue with a good healthy granular base we will continue with the local wound care.    Millicent was seen today for wound check and follow-up.    Diagnoses and all orders for this visit:    Type 2 diabetes mellitus with foot ulcer, with long-term current use of insulin (CMS/[facility])    Edema of extremity of unknown cause    Diabetic polyneuropathy associated with type 2 diabetes mellitus (CMS/[facility])    Lymphedema    Decubitus ulcer, heel, right, unstageable (CMS/[facility])          Recommendations/Plan:  I packed collagen of the wound today with a dry dressing.  She was sent with nursing home instructions to begin applying Santyl to the wound base followed by the wound VAC and continue Monday, Wednesday,  Friday wound VAC changes.  I'll appear in 2 weeks.  We will submit for wound grafts for her.    Return in about 2 weeks (around 2/13/2019).  Patient agreeable to call or return sooner for any concerns.

## 2019-01-30 NOTE — PROGRESS NOTES
DATE OF VISIT: 1/30/2019    REASON FOR VISIT: Followup for 1.  Pancytopenia 2.  Lymphadenopathy     HISTORY OF PRESENT ILLNESS: The patient is a very pleasant 58 y.o. female with past medical history significant for diffuse lymphadenopathy diagnosed 01/16/2019 . The patient was admitted to [facility] on 1/10/2019 with sepsis. She was transferred from [facility]. The patient was brought by ambulance to the ER secondary to AMS noted by her sister. She was hypotensive as well as hypothermic. Blood cultures has been negative. She had CT scans at the OSH that showed diffuse lymphadenopathy. She is able to answer some of my questions.  She does have transportations issues. The patient is here today for hospital follow up.     SUBJECTIVE: The patient has been doing fairly well.She wsa brought by ambulance to her appointment.  she denied any fever or chills, no night sweats, denied any headaches    PAST MEDICAL HISTORY/SOCIAL HISTORY/FAMILY HISTORY: Unchanged from my prior documentation done on 01/16/2019    Review of Systems   Constitutional: Positive for fatigue. Negative for activity change, appetite change, chills, fever and unexpected weight change.   HENT: Negative for hearing loss, mouth sores, nosebleeds, sore throat and trouble swallowing.    Eyes: Negative for visual disturbance.   Respiratory: Negative for cough, chest tightness, shortness of breath and wheezing.    Cardiovascular: Negative for chest pain, palpitations and leg swelling.   Gastrointestinal: Negative for abdominal distention, abdominal pain, blood in stool, constipation, diarrhea, nausea, rectal pain and vomiting.   Endocrine: Negative for cold intolerance and heat intolerance.   Genitourinary: Negative for difficulty urinating, dysuria, frequency and urgency.   Musculoskeletal: Negative for arthralgias, back pain, gait problem, joint swelling and myalgias.   Skin: Negative for rash.   Neurological: Positive for weakness. Negative for  dizziness, tremors, syncope, light-headedness, numbness and headaches.   Hematological: Negative for adenopathy. Does not bruise/bleed easily.   Psychiatric/Behavioral: Negative for confusion, sleep disturbance and suicidal ideas. The patient is not nervous/anxious.          Current Outpatient Medications:   •  allopurinol (ZYLOPRIM) 300 MG tablet, Take 300 mg by mouth Daily., Disp: , Rfl:   •  amLODIPine (NORVASC) 5 MG tablet, , Disp: , Rfl: 0  •  carvedilol (COREG) 6.25 MG tablet, Take 1 tablet by mouth 2 (Two) Times a Day With Meals., Disp: , Rfl:   •  esomeprazole (nexIUM) 40 MG capsule, Take 40 mg by mouth Every Morning Before Breakfast., Disp: , Rfl: 0  •  ferrous sulfate 324 (65 Fe) MG tablet delayed-release EC tablet, Take 324 mg by mouth 2 (Two) Times a Day., Disp: , Rfl:   •  furosemide (LASIX) 20 MG tablet, , Disp: , Rfl: 0  •  gabapentin (NEURONTIN) 300 MG capsule, Take 1 capsule by mouth Every 12 (Twelve) Hours., Disp: 20 capsule, Rfl: 0  •  hydrALAZINE (APRESOLINE) 25 MG tablet, Take 1 tablet by mouth Every 8 (Eight) Hours., Disp: , Rfl:   •  Insulin Glargine (BASAGLAR KWIKPEN) 100 UNIT/ML injection pen, Inject 30 Units under the skin into the appropriate area as directed Every Night., Disp: , Rfl:   •  levothyroxine (SYNTHROID, LEVOTHROID) 100 MCG tablet, Take 1 tablet by mouth Daily., Disp: , Rfl:   •  meclizine 25 MG chewable tablet chewable tablet, Chew 25 mg 3 (Three) Times a Day As Needed (for dizziness or motion sickness)., Disp: , Rfl:   •  RA ASPIRIN EC 81 MG EC tablet, Take 81 mg by mouth Daily., Disp: , Rfl: 0  •  RA VITAMIN C 500 MG tablet, Take 500 mg by mouth Daily., Disp: , Rfl: 0  •  RA VITAMIN D-3 2000 units capsule, Take 2,000 Units by mouth Daily., Disp: , Rfl: 0  •  simvastatin (ZOCOR) 20 MG tablet, Take 20 mg by mouth Every Night., Disp: , Rfl: 0  •  spironolactone (ALDACTONE) 25 MG tablet, Take 1 tablet by mouth Daily., Disp: , Rfl:   •  TRADJENTA 5 MG tablet tablet, Take 5 mg by  "mouth Daily., Disp: , Rfl:     PHYSICAL EXAMINATION:   /66   Pulse 54   Temp 97.2 °F (36.2 °C) (Temporal)   Resp 17   Ht 165.1 cm (65\")   Wt (!) 149 kg (329 lb)   BMI 54.75 kg/m²    ECOG Performance Status: 3 - Symptomatic, >50% confined to bed  General Appearance:  alert, cooperative, no apparent distress and appears stated age   Neurologic/Psychiatric: A&O x 3, gait steady, appropriate affect, strength 5/5 in all muscle groups   HEENT:  Normocephalic, without obvious abnormality, mucous membranes moist   Neck: Supple, symmetrical, trachea midline, no adenopathy;  No thyromegaly, masses, or tenderness   Lungs:   Clear to auscultation bilaterally; respirations regular, even, and unlabored bilaterally   Heart:  Regular rate and rhythm, no murmurs appreciated   Abdomen:   Soft, non-tender, non-distended and no organomegaly   Lymph nodes: No cervical, supraclavicular, inguinal or axillary adenopathy noted   Extremities: Normal, atraumatic; no clubbing, cyanosis, or edema    Skin: No rashes, ulcers, or suspicious lesions noted     Admission on 01/10/2019, Discharged on 01/22/2019   No results displayed because visit has over 200 results.           No results found.    ASSESSMENT: The patient is a very pleasant 58 y.o. female  with diffuse lymphadenopathy.    PROBLEM LIST:  1. Leukocytosis, Mostly neutrophilia, reactive, resolved.   2. Microcytic anemia: Multifactorial including suppresed marrow as well as CKD stage IV.  3. Thrombocytopenia: Resolved.  4. Diffuse lymphadenopathy: Visible and outside CAT scan done [facility] room January 2019.  5. HTN  6. Hypothyroidism  7. GERD  8. Acute renal failure  9. Diabetes Type 2  10. Right diabetic foot infection status post incision and drainage on 1/14/2019      PLAN:  1.  2. We will check a CBC prior to follow up. We will plan to transfuse as needed for Hgb less than 7 or platelet less than 20.  3. Patient might benefit from rEPO.  4.  The " patient will follow up with us in 3 months with repeat CBC.   Given the patient's poor performance status as well as multiple co morbidities I do not see a reason to further investigate her lymphadenopathy at this time. I think it would be reasonable to repeat her imaging as an out patient if she improves her performance status.  5. We will continue Nexium for GERD.   6. We will continue the patient on norvasc and coreg for HTN.  7. The patient will follow up with Dr. Rueda for Right diabetic foot infection status post incision and drainage.         Pedro Theodore MD        1/30/2019    Scribed for Pedro Theodore MD by Page HOOVER. 1/30/2019  10:27 AM  I, Pedro Theodore MD, personally performed the services described in this documentation as scribed by the above named individual in my presence, and it is both accurate and complete.  1/30/2019  10:27 AM

## 2019-02-04 ENCOUNTER — HOSPITAL ENCOUNTER (INPATIENT)
Facility: HOSPITAL | Age: 59
LOS: 1 days | Discharge: ANOTHER HOSPITAL | DRG: 812 | End: 2019-02-05
Attending: FAMILY MEDICINE | Admitting: INTERNAL MEDICINE
Payer: MEDICARE

## 2019-02-04 ENCOUNTER — HOSPITAL ENCOUNTER (OUTPATIENT)
Facility: HOSPITAL | Age: 59
Discharge: HOME | DRG: 812 | End: 2019-02-04
Payer: MEDICARE

## 2019-02-04 DIAGNOSIS — K92.1 GASTROINTESTINAL HEMORRHAGE WITH MELENA: Primary | ICD-10-CM

## 2019-02-04 DIAGNOSIS — N17.9 ACUTE RENAL FAILURE, UNSPECIFIED ACUTE RENAL FAILURE TYPE (HCC): ICD-10-CM

## 2019-02-04 DIAGNOSIS — D64.9 ANEMIA, UNSPECIFIED TYPE: ICD-10-CM

## 2019-02-04 LAB
ABO/RH: NORMAL
ALBUMIN SERPL-MCNC: 3.1 G/DL (ref 3.4–4.8)
ANION GAP SERPL CALCULATED.3IONS-SCNC: 8 MMOL/L (ref 3–16)
ANTIBODY SCREEN: NORMAL
BASOPHILS ABSOLUTE: 0 K/UL (ref 0–0.1)
BASOPHILS RELATIVE PERCENT: 0.5 %
BLOOD BANK DISPENSE STATUS: NORMAL
BLOOD BANK DISPENSE STATUS: NORMAL
BLOOD BANK PRODUCT CODE: NORMAL
BLOOD BANK PRODUCT CODE: NORMAL
BPU ID: NORMAL
BPU ID: NORMAL
BUN BLDV-MCNC: 58 MG/DL (ref 6–20)
CALCIUM SERPL-MCNC: 8.7 MG/DL (ref 8.5–10.5)
CHLORIDE BLD-SCNC: 111 MMOL/L (ref 98–107)
CHOLESTEROL, TOTAL: 151 MG/DL (ref 0–200)
CO2: 22 MMOL/L (ref 20–30)
COMPONENT: NORMAL
COMPONENT: NORMAL
CREAT SERPL-MCNC: 2.2 MG/DL (ref 0.4–1.2)
DONOR TYPE/RH: NORMAL
DONOR TYPE/RH: NORMAL
EOSINOPHILS ABSOLUTE: 0.3 K/UL (ref 0–0.4)
EOSINOPHILS RELATIVE PERCENT: 5 %
GFR AFRICAN AMERICAN: 27
GFR NON-AFRICAN AMERICAN: 23
GLUCOSE BLD-MCNC: 110 MG/DL (ref 74–106)
GLUCOSE BLD-MCNC: 178 MG/DL (ref 74–106)
GLUCOSE BLD-MCNC: 94 MG/DL (ref 74–106)
HCT VFR BLD CALC: 26.4 % (ref 37–47)
HDLC SERPL-MCNC: 64 MG/DL (ref 40–60)
HEMOGLOBIN: 7.4 G/DL (ref 11.5–16.5)
HOLLISTER NO: NORMAL
HOLLISTER NO: NORMAL
IMMATURE GRANULOCYTES #: 0.2 K/UL
IMMATURE GRANULOCYTES %: 4 % (ref 0–5)
LDL CHOLESTEROL CALCULATED: 68 MG/DL
LYMPHOCYTES ABSOLUTE: 1.2 K/UL (ref 1.5–4)
LYMPHOCYTES RELATIVE PERCENT: 20.2 %
MCH RBC QN AUTO: 24.9 PG (ref 27–32)
MCHC RBC AUTO-ENTMCNC: 28 G/DL (ref 31–35)
MCV RBC AUTO: 88.9 FL (ref 80–100)
MONOCYTES ABSOLUTE: 0.4 K/UL (ref 0.2–0.8)
MONOCYTES RELATIVE PERCENT: 6.2 %
NEUTROPHILS ABSOLUTE: 3.7 K/UL (ref 2–7.5)
NEUTROPHILS RELATIVE PERCENT: 64.1 %
OCCULT BLOOD DIAGNOSTIC: ABNORMAL
PDW BLD-RTO: 21.3 % (ref 11–16)
PERFORMED ON: ABNORMAL
PERFORMED ON: ABNORMAL
PHOSPHORUS: 4.4 MG/DL (ref 2.5–4.5)
PLATELET # BLD: 163 K/UL (ref 150–400)
PMV BLD AUTO: 11.8 FL (ref 6–10)
POTASSIUM SERPL-SCNC: 6.1 MMOL/L (ref 3.4–5.1)
RBC # BLD: 2.97 M/UL (ref 3.8–5.8)
SODIUM BLD-SCNC: 141 MMOL/L (ref 136–145)
TRIGL SERPL-MCNC: 96 MG/DL (ref 0–249)
TSH SERPL DL<=0.05 MIU/L-ACNC: 10.61 UIU/ML (ref 0.35–5.5)
VLDLC SERPL CALC-MCNC: 19 MG/DL
WBC # BLD: 5.8 K/UL (ref 4–11)

## 2019-02-04 PROCEDURE — C9113 INJ PANTOPRAZOLE SODIUM, VIA: HCPCS | Performed by: NURSE PRACTITIONER

## 2019-02-04 PROCEDURE — 86901 BLOOD TYPING SEROLOGIC RH(D): CPT

## 2019-02-04 PROCEDURE — 84443 ASSAY THYROID STIM HORMONE: CPT

## 2019-02-04 PROCEDURE — 36415 COLL VENOUS BLD VENIPUNCTURE: CPT

## 2019-02-04 PROCEDURE — 6370000000 HC RX 637 (ALT 250 FOR IP): Performed by: NURSE PRACTITIONER

## 2019-02-04 PROCEDURE — 2500000003 HC RX 250 WO HCPCS: Performed by: NURSE PRACTITIONER

## 2019-02-04 PROCEDURE — 99285 EMERGENCY DEPT VISIT HI MDM: CPT

## 2019-02-04 PROCEDURE — P9016 RBC LEUKOCYTES REDUCED: HCPCS

## 2019-02-04 PROCEDURE — 93005 ELECTROCARDIOGRAM TRACING: CPT

## 2019-02-04 PROCEDURE — 36430 TRANSFUSION BLD/BLD COMPNT: CPT

## 2019-02-04 PROCEDURE — 86920 COMPATIBILITY TEST SPIN: CPT

## 2019-02-04 PROCEDURE — 86850 RBC ANTIBODY SCREEN: CPT

## 2019-02-04 PROCEDURE — 86900 BLOOD TYPING SEROLOGIC ABO: CPT

## 2019-02-04 PROCEDURE — 80061 LIPID PANEL: CPT

## 2019-02-04 PROCEDURE — 1200000000 HC SEMI PRIVATE

## 2019-02-04 PROCEDURE — 2580000003 HC RX 258: Performed by: NURSE PRACTITIONER

## 2019-02-04 PROCEDURE — 94760 N-INVAS EAR/PLS OXIMETRY 1: CPT

## 2019-02-04 PROCEDURE — 80069 RENAL FUNCTION PANEL: CPT

## 2019-02-04 PROCEDURE — 85025 COMPLETE CBC W/AUTO DIFF WBC: CPT

## 2019-02-04 PROCEDURE — 6360000002 HC RX W HCPCS: Performed by: NURSE PRACTITIONER

## 2019-02-04 PROCEDURE — G0328 FECAL BLOOD SCRN IMMUNOASSAY: HCPCS

## 2019-02-04 RX ORDER — SODIUM CHLORIDE 0.9 % (FLUSH) 0.9 %
10 SYRINGE (ML) INJECTION PRN
Status: DISCONTINUED | OUTPATIENT
Start: 2019-02-04 | End: 2019-02-05 | Stop reason: HOSPADM

## 2019-02-04 RX ORDER — ASPIRIN 81 MG/1
81 TABLET ORAL DAILY
Status: ON HOLD | COMMUNITY
End: 2019-04-18

## 2019-02-04 RX ORDER — SIMVASTATIN 20 MG
20 TABLET ORAL NIGHTLY
Status: DISCONTINUED | OUTPATIENT
Start: 2019-02-04 | End: 2019-02-05 | Stop reason: HOSPADM

## 2019-02-04 RX ORDER — CARVEDILOL 6.25 MG/1
6.25 TABLET ORAL 2 TIMES DAILY WITH MEALS
COMMUNITY
End: 2019-03-06

## 2019-02-04 RX ORDER — AMLODIPINE BESYLATE 5 MG/1
5 TABLET ORAL DAILY
Status: DISCONTINUED | OUTPATIENT
Start: 2019-02-05 | End: 2019-02-05

## 2019-02-04 RX ORDER — ALBUTEROL SULFATE 90 UG/1
2 AEROSOL, METERED RESPIRATORY (INHALATION) EVERY 6 HOURS PRN
Status: DISCONTINUED | OUTPATIENT
Start: 2019-02-04 | End: 2019-02-04

## 2019-02-04 RX ORDER — FERROUS SULFATE TAB EC 324 MG (65 MG FE EQUIVALENT) 324 (65 FE) MG
324 TABLET DELAYED RESPONSE ORAL 2 TIMES DAILY WITH MEALS
Status: DISCONTINUED | OUTPATIENT
Start: 2019-02-04 | End: 2019-02-05 | Stop reason: HOSPADM

## 2019-02-04 RX ORDER — NICOTINE POLACRILEX 4 MG
15 LOZENGE BUCCAL PRN
Status: DISCONTINUED | OUTPATIENT
Start: 2019-02-04 | End: 2019-02-05 | Stop reason: HOSPADM

## 2019-02-04 RX ORDER — SODIUM CHLORIDE 0.9 % (FLUSH) 0.9 %
10 SYRINGE (ML) INJECTION EVERY 12 HOURS SCHEDULED
Status: DISCONTINUED | OUTPATIENT
Start: 2019-02-04 | End: 2019-02-05 | Stop reason: HOSPADM

## 2019-02-04 RX ORDER — 0.9 % SODIUM CHLORIDE 0.9 %
10 VIAL (ML) INJECTION DAILY
Status: DISCONTINUED | OUTPATIENT
Start: 2019-02-04 | End: 2019-02-05 | Stop reason: HOSPADM

## 2019-02-04 RX ORDER — DEXTROSE MONOHYDRATE 50 MG/ML
100 INJECTION, SOLUTION INTRAVENOUS PRN
Status: DISCONTINUED | OUTPATIENT
Start: 2019-02-04 | End: 2019-02-05 | Stop reason: HOSPADM

## 2019-02-04 RX ORDER — ACETAMINOPHEN 325 MG/1
650 TABLET ORAL ONCE
Status: COMPLETED | OUTPATIENT
Start: 2019-02-04 | End: 2019-02-04

## 2019-02-04 RX ORDER — BLOOD-GLUCOSE METER
1 KIT MISCELLANEOUS DAILY PRN
Status: DISCONTINUED | OUTPATIENT
Start: 2019-02-04 | End: 2019-02-04 | Stop reason: ALTCHOICE

## 2019-02-04 RX ORDER — 0.9 % SODIUM CHLORIDE 0.9 %
250 INTRAVENOUS SOLUTION INTRAVENOUS ONCE
Status: COMPLETED | OUTPATIENT
Start: 2019-02-04 | End: 2019-02-05

## 2019-02-04 RX ORDER — AMLODIPINE BESYLATE 5 MG/1
10 TABLET ORAL DAILY
Status: CANCELLED | OUTPATIENT
Start: 2019-02-04

## 2019-02-04 RX ORDER — PROMETHAZINE HYDROCHLORIDE 25 MG/1
25 TABLET ORAL EVERY 6 HOURS PRN
Status: DISCONTINUED | OUTPATIENT
Start: 2019-02-04 | End: 2019-02-05 | Stop reason: HOSPADM

## 2019-02-04 RX ORDER — GLIPIZIDE 10 MG/1
10 TABLET ORAL
Status: DISCONTINUED | OUTPATIENT
Start: 2019-02-04 | End: 2019-02-05

## 2019-02-04 RX ORDER — LEVOTHYROXINE SODIUM 0.05 MG/1
50 TABLET ORAL DAILY
Status: DISCONTINUED | OUTPATIENT
Start: 2019-02-04 | End: 2019-02-05

## 2019-02-04 RX ORDER — ALBUTEROL SULFATE 2.5 MG/3ML
2.5 SOLUTION RESPIRATORY (INHALATION) EVERY 6 HOURS PRN
Status: DISCONTINUED | OUTPATIENT
Start: 2019-02-04 | End: 2019-02-05 | Stop reason: HOSPADM

## 2019-02-04 RX ORDER — INSULIN GLARGINE 100 [IU]/ML
30 INJECTION, SOLUTION SUBCUTANEOUS NIGHTLY
Status: DISCONTINUED | OUTPATIENT
Start: 2019-02-04 | End: 2019-02-05 | Stop reason: HOSPADM

## 2019-02-04 RX ORDER — PANTOPRAZOLE SODIUM 40 MG/10ML
40 INJECTION, POWDER, LYOPHILIZED, FOR SOLUTION INTRAVENOUS 2 TIMES DAILY
Status: DISCONTINUED | OUTPATIENT
Start: 2019-02-04 | End: 2019-02-05 | Stop reason: HOSPADM

## 2019-02-04 RX ORDER — HYDRALAZINE HYDROCHLORIDE 25 MG/1
25 TABLET, FILM COATED ORAL 2 TIMES DAILY
Status: ON HOLD | COMMUNITY
End: 2019-04-18 | Stop reason: HOSPADM

## 2019-02-04 RX ORDER — ONDANSETRON 2 MG/ML
4 INJECTION INTRAMUSCULAR; INTRAVENOUS EVERY 6 HOURS PRN
Status: DISCONTINUED | OUTPATIENT
Start: 2019-02-04 | End: 2019-02-05 | Stop reason: HOSPADM

## 2019-02-04 RX ORDER — GABAPENTIN 300 MG/1
300 CAPSULE ORAL 2 TIMES DAILY
Status: DISCONTINUED | OUTPATIENT
Start: 2019-02-04 | End: 2019-02-05 | Stop reason: HOSPADM

## 2019-02-04 RX ORDER — ASCORBIC ACID 500 MG
500 TABLET ORAL DAILY
Status: DISCONTINUED | OUTPATIENT
Start: 2019-02-04 | End: 2019-02-05 | Stop reason: HOSPADM

## 2019-02-04 RX ORDER — ZINC SULFATE 50(220)MG
220 CAPSULE ORAL DAILY
Status: DISCONTINUED | OUTPATIENT
Start: 2019-02-04 | End: 2019-02-05

## 2019-02-04 RX ORDER — DIPHENHYDRAMINE HCL 25 MG
25 CAPSULE ORAL ONCE
Status: COMPLETED | OUTPATIENT
Start: 2019-02-04 | End: 2019-02-04

## 2019-02-04 RX ORDER — M-VIT,TX,IRON,MINS/CALC/FOLIC 27MG-0.4MG
1 TABLET ORAL DAILY
Status: ON HOLD | COMMUNITY
End: 2020-04-27 | Stop reason: HOSPADM

## 2019-02-04 RX ORDER — DEXTROSE MONOHYDRATE 25 G/50ML
12.5 INJECTION, SOLUTION INTRAVENOUS PRN
Status: DISCONTINUED | OUTPATIENT
Start: 2019-02-04 | End: 2019-02-05 | Stop reason: HOSPADM

## 2019-02-04 RX ORDER — SODIUM POLYSTYRENE SULFONATE 15 G/60ML
15 SUSPENSION ORAL; RECTAL ONCE
Status: COMPLETED | OUTPATIENT
Start: 2019-02-04 | End: 2019-02-04

## 2019-02-04 RX ADMIN — INSULIN LISPRO 1 UNITS: 100 INJECTION, SOLUTION INTRAVENOUS; SUBCUTANEOUS at 20:16

## 2019-02-04 RX ADMIN — FERROUS SULFATE TAB EC 324 MG (65 MG FE EQUIVALENT) 324 MG: 324 (65 FE) TABLET DELAYED RESPONSE at 18:22

## 2019-02-04 RX ADMIN — PANTOPRAZOLE SODIUM 40 MG: 40 INJECTION, POWDER, FOR SOLUTION INTRAVENOUS at 20:15

## 2019-02-04 RX ADMIN — SIMVASTATIN 20 MG: 20 TABLET, FILM COATED ORAL at 20:15

## 2019-02-04 RX ADMIN — SODIUM CHLORIDE 250 ML: 9 INJECTION, SOLUTION INTRAVENOUS at 18:04

## 2019-02-04 RX ADMIN — GABAPENTIN 300 MG: 300 CAPSULE ORAL at 20:16

## 2019-02-04 RX ADMIN — DIPHENHYDRAMINE HYDROCHLORIDE 25 MG: 25 CAPSULE ORAL at 16:35

## 2019-02-04 RX ADMIN — SILVER SULFADIAZINE: 10 CREAM TOPICAL at 22:52

## 2019-02-04 RX ADMIN — Medication 10 ML: at 20:20

## 2019-02-04 RX ADMIN — INSULIN GLARGINE 30 UNITS: 100 INJECTION, SOLUTION SUBCUTANEOUS at 20:16

## 2019-02-04 RX ADMIN — SODIUM POLYSTYRENE SULFONATE 15 G: 15 SUSPENSION ORAL; RECTAL at 18:22

## 2019-02-04 RX ADMIN — ACETAMINOPHEN 650 MG: 325 TABLET, FILM COATED ORAL at 16:35

## 2019-02-04 ASSESSMENT — ENCOUNTER SYMPTOMS
VOMITING: 0
TROUBLE SWALLOWING: 0
DIARRHEA: 0
SHORTNESS OF BREATH: 0
ABDOMINAL PAIN: 0
CONSTIPATION: 0
NAUSEA: 0

## 2019-02-04 ASSESSMENT — PAIN SCALES - GENERAL: PAINLEVEL_OUTOF10: 0

## 2019-02-05 VITALS
HEART RATE: 61 BPM | OXYGEN SATURATION: 97 % | WEIGHT: 293 LBS | SYSTOLIC BLOOD PRESSURE: 133 MMHG | DIASTOLIC BLOOD PRESSURE: 68 MMHG | RESPIRATION RATE: 18 BRPM | BODY MASS INDEX: 47.09 KG/M2 | HEIGHT: 66 IN | TEMPERATURE: 98.4 F

## 2019-02-05 PROBLEM — L97.509 FOOT ULCERATION (HCC): Status: ACTIVE | Noted: 2019-02-05

## 2019-02-05 PROBLEM — E66.01 MORBID OBESITY (HCC): Status: ACTIVE | Noted: 2019-02-05

## 2019-02-05 LAB
AMORPHOUS: ABNORMAL /HPF
ANION GAP SERPL CALCULATED.3IONS-SCNC: 11 MMOL/L (ref 3–16)
ANION GAP SERPL CALCULATED.3IONS-SCNC: 9 MMOL/L (ref 3–16)
BACTERIA: ABNORMAL /HPF
BASOPHILS ABSOLUTE: 0 K/UL (ref 0–0.1)
BASOPHILS RELATIVE PERCENT: 0.8 %
BILIRUBIN URINE: NEGATIVE
BLOOD, URINE: NEGATIVE
BUN BLDV-MCNC: 54 MG/DL (ref 6–20)
BUN BLDV-MCNC: 56 MG/DL (ref 6–20)
CALCIUM SERPL-MCNC: 8.7 MG/DL (ref 8.5–10.5)
CALCIUM SERPL-MCNC: 8.8 MG/DL (ref 8.5–10.5)
CHLORIDE BLD-SCNC: 108 MMOL/L (ref 98–107)
CHLORIDE BLD-SCNC: 109 MMOL/L (ref 98–107)
CLARITY: ABNORMAL
CO2: 21 MMOL/L (ref 20–30)
CO2: 22 MMOL/L (ref 20–30)
COLOR: YELLOW
CREAT SERPL-MCNC: 2.1 MG/DL (ref 0.4–1.2)
CREAT SERPL-MCNC: 2.3 MG/DL (ref 0.4–1.2)
EOSINOPHILS ABSOLUTE: 0.3 K/UL (ref 0–0.4)
EOSINOPHILS RELATIVE PERCENT: 5.6 %
EPITHELIAL CELLS, UA: ABNORMAL /HPF
GFR AFRICAN AMERICAN: 26
GFR AFRICAN AMERICAN: 29
GFR NON-AFRICAN AMERICAN: 22
GFR NON-AFRICAN AMERICAN: 24
GLUCOSE BLD-MCNC: 107 MG/DL (ref 74–106)
GLUCOSE BLD-MCNC: 109 MG/DL (ref 74–106)
GLUCOSE BLD-MCNC: 113 MG/DL (ref 74–106)
GLUCOSE BLD-MCNC: 92 MG/DL (ref 74–106)
GLUCOSE URINE: NEGATIVE MG/DL
HCT VFR BLD CALC: 30.8 % (ref 37–47)
HEMOGLOBIN: 8.4 G/DL (ref 11.5–16.5)
IMMATURE GRANULOCYTES #: 0.2 K/UL
IMMATURE GRANULOCYTES %: 4 % (ref 0–5)
KETONES, URINE: NEGATIVE MG/DL
LEUKOCYTE ESTERASE, URINE: NEGATIVE
LYMPHOCYTES ABSOLUTE: 1.1 K/UL (ref 1.5–4)
LYMPHOCYTES RELATIVE PERCENT: 23.5 %
MCH RBC QN AUTO: 25.4 PG (ref 27–32)
MCHC RBC AUTO-ENTMCNC: 27.3 G/DL (ref 31–35)
MCV RBC AUTO: 93.1 FL (ref 80–100)
MICROSCOPIC EXAMINATION: YES
MONOCYTES ABSOLUTE: 0.2 K/UL (ref 0.2–0.8)
MONOCYTES RELATIVE PERCENT: 5 %
NEUTROPHILS ABSOLUTE: 2.9 K/UL (ref 2–7.5)
NEUTROPHILS RELATIVE PERCENT: 61.1 %
NITRITE, URINE: NEGATIVE
PDW BLD-RTO: 21 % (ref 11–16)
PERFORMED ON: ABNORMAL
PERFORMED ON: ABNORMAL
PH UA: 5.5
PLATELET # BLD: 164 K/UL (ref 150–400)
PMV BLD AUTO: 10.7 FL (ref 6–10)
POTASSIUM REFLEX MAGNESIUM: 6.1 MMOL/L (ref 3.4–5.1)
POTASSIUM SERPL-SCNC: 5.5 MMOL/L (ref 3.4–5.1)
PROTEIN UA: 100 MG/DL
RBC # BLD: 3.31 M/UL (ref 3.8–5.8)
RBC UA: ABNORMAL /HPF (ref 0–2)
SODIUM BLD-SCNC: 140 MMOL/L (ref 136–145)
SODIUM BLD-SCNC: 140 MMOL/L (ref 136–145)
SPECIFIC GRAVITY UA: 1.01
URINE REFLEX TO CULTURE: ABNORMAL
URINE TYPE: ABNORMAL
UROBILINOGEN, URINE: 0.2 E.U./DL
WBC # BLD: 4.8 K/UL (ref 4–11)
WBC UA: ABNORMAL /HPF (ref 0–5)
YEAST: PRESENT /HPF

## 2019-02-05 PROCEDURE — 81001 URINALYSIS AUTO W/SCOPE: CPT

## 2019-02-05 PROCEDURE — 85025 COMPLETE CBC W/AUTO DIFF WBC: CPT

## 2019-02-05 PROCEDURE — 80048 BASIC METABOLIC PNL TOTAL CA: CPT

## 2019-02-05 PROCEDURE — 0T9B70Z DRAINAGE OF BLADDER WITH DRAINAGE DEVICE, VIA NATURAL OR ARTIFICIAL OPENING: ICD-10-PCS | Performed by: INTERNAL MEDICINE

## 2019-02-05 PROCEDURE — 99235 HOSP IP/OBS SAME DATE MOD 70: CPT | Performed by: INTERNAL MEDICINE

## 2019-02-05 PROCEDURE — 36415 COLL VENOUS BLD VENIPUNCTURE: CPT

## 2019-02-05 PROCEDURE — 97802 MEDICAL NUTRITION INDIV IN: CPT

## 2019-02-05 PROCEDURE — 6370000000 HC RX 637 (ALT 250 FOR IP): Performed by: NURSE PRACTITIONER

## 2019-02-05 PROCEDURE — 93005 ELECTROCARDIOGRAM TRACING: CPT

## 2019-02-05 PROCEDURE — 2580000003 HC RX 258: Performed by: NURSE PRACTITIONER

## 2019-02-05 PROCEDURE — 6360000002 HC RX W HCPCS: Performed by: NURSE PRACTITIONER

## 2019-02-05 PROCEDURE — C9113 INJ PANTOPRAZOLE SODIUM, VIA: HCPCS | Performed by: NURSE PRACTITIONER

## 2019-02-05 RX ORDER — ZINC OXIDE AND DIMETHICONE 120; 10 MG/G; MG/G
CREAM TOPICAL PRN
COMMUNITY
End: 2019-03-06

## 2019-02-05 RX ORDER — SODIUM POLYSTYRENE SULFONATE 15 G/60ML
15 SUSPENSION ORAL; RECTAL ONCE
Status: COMPLETED | OUTPATIENT
Start: 2019-02-05 | End: 2019-02-05

## 2019-02-05 RX ORDER — CARVEDILOL 6.25 MG/1
6.25 TABLET ORAL 2 TIMES DAILY WITH MEALS
Status: DISCONTINUED | OUTPATIENT
Start: 2019-02-05 | End: 2019-02-05 | Stop reason: HOSPADM

## 2019-02-05 RX ORDER — HYDRALAZINE HYDROCHLORIDE 25 MG/1
25 TABLET, FILM COATED ORAL 3 TIMES DAILY
Status: DISCONTINUED | OUTPATIENT
Start: 2019-02-05 | End: 2019-02-05 | Stop reason: HOSPADM

## 2019-02-05 RX ORDER — M-VIT,TX,IRON,MINS/CALC/FOLIC 27MG-0.4MG
1 TABLET ORAL DAILY
Status: DISCONTINUED | OUTPATIENT
Start: 2019-02-05 | End: 2019-02-05 | Stop reason: HOSPADM

## 2019-02-05 RX ORDER — ASPIRIN 81 MG/1
81 TABLET ORAL DAILY
Status: DISCONTINUED | OUTPATIENT
Start: 2019-02-05 | End: 2019-02-05 | Stop reason: HOSPADM

## 2019-02-05 RX ORDER — LEVOTHYROXINE SODIUM 0.1 MG/1
100 TABLET ORAL DAILY
Status: DISCONTINUED | OUTPATIENT
Start: 2019-02-06 | End: 2019-02-05 | Stop reason: HOSPADM

## 2019-02-05 RX ADMIN — AMLODIPINE BESYLATE 5 MG: 5 TABLET ORAL at 08:35

## 2019-02-05 RX ADMIN — CARVEDILOL 6.25 MG: 6.25 TABLET, FILM COATED ORAL at 11:31

## 2019-02-05 RX ADMIN — SILVER SULFADIAZINE: 10 CREAM TOPICAL at 08:36

## 2019-02-05 RX ADMIN — MULTIPLE VITAMINS W/ MINERALS TAB 1 TABLET: TAB at 11:29

## 2019-02-05 RX ADMIN — LINAGLIPTIN 5 MG: 5 TABLET, FILM COATED ORAL at 08:35

## 2019-02-05 RX ADMIN — SODIUM POLYSTYRENE SULFONATE 15 G: 15 SUSPENSION ORAL; RECTAL at 08:34

## 2019-02-05 RX ADMIN — PANTOPRAZOLE SODIUM 40 MG: 40 INJECTION, POWDER, FOR SOLUTION INTRAVENOUS at 08:34

## 2019-02-05 RX ADMIN — GABAPENTIN 300 MG: 300 CAPSULE ORAL at 08:34

## 2019-02-05 RX ADMIN — HYDRALAZINE HYDROCHLORIDE 25 MG: 25 TABLET, FILM COATED ORAL at 11:31

## 2019-02-05 RX ADMIN — GLIPIZIDE 10 MG: 10 TABLET ORAL at 06:24

## 2019-02-05 RX ADMIN — FERROUS SULFATE TAB EC 324 MG (65 MG FE EQUIVALENT) 324 MG: 324 (65 FE) TABLET DELAYED RESPONSE at 08:35

## 2019-02-05 RX ADMIN — LEVOTHYROXINE SODIUM 50 MCG: 50 TABLET ORAL at 06:24

## 2019-02-05 RX ADMIN — IRON SUCROSE 200 MG: 20 INJECTION, SOLUTION INTRAVENOUS at 11:29

## 2019-02-05 RX ADMIN — MICONAZOLE NITRATE: 20 CREAM TOPICAL at 11:29

## 2019-02-05 RX ADMIN — OXYCODONE HYDROCHLORIDE AND ACETAMINOPHEN 500 MG: 500 TABLET ORAL at 08:34

## 2019-02-05 RX ADMIN — ASPIRIN 81 MG: 81 TABLET, COATED ORAL at 11:29

## 2019-02-05 RX ADMIN — Medication 10 ML: at 08:35

## 2019-02-05 RX ADMIN — ZINC SULFATE 220 MG (50 MG) CAPSULE 220 MG: CAPSULE at 08:35

## 2019-03-06 ENCOUNTER — HOSPITAL ENCOUNTER (EMERGENCY)
Facility: HOSPITAL | Age: 59
Discharge: ANOTHER HOSPITAL | End: 2019-03-06
Attending: EMERGENCY MEDICINE
Payer: MEDICARE

## 2019-03-06 ENCOUNTER — APPOINTMENT (OUTPATIENT)
Dept: GENERAL RADIOLOGY | Facility: HOSPITAL | Age: 59
End: 2019-03-06
Payer: MEDICARE

## 2019-03-06 VITALS
SYSTOLIC BLOOD PRESSURE: 116 MMHG | WEIGHT: 293 LBS | HEART RATE: 116 BPM | TEMPERATURE: 97.9 F | DIASTOLIC BLOOD PRESSURE: 65 MMHG | BODY MASS INDEX: 47.09 KG/M2 | OXYGEN SATURATION: 99 % | RESPIRATION RATE: 22 BRPM | HEIGHT: 66 IN

## 2019-03-06 DIAGNOSIS — T83.511A URINARY TRACT INFECTION ASSOCIATED WITH CATHETERIZATION OF URINARY TRACT, UNSPECIFIED INDWELLING URINARY CATHETER TYPE, INITIAL ENCOUNTER (HCC): ICD-10-CM

## 2019-03-06 DIAGNOSIS — N39.0 URINARY TRACT INFECTION ASSOCIATED WITH CATHETERIZATION OF URINARY TRACT, UNSPECIFIED INDWELLING URINARY CATHETER TYPE, INITIAL ENCOUNTER (HCC): ICD-10-CM

## 2019-03-06 DIAGNOSIS — K92.0 GASTROINTESTINAL HEMORRHAGE WITH HEMATEMESIS: Primary | ICD-10-CM

## 2019-03-06 DIAGNOSIS — N18.9 CHRONIC RENAL FAILURE, UNSPECIFIED CKD STAGE: ICD-10-CM

## 2019-03-06 DIAGNOSIS — A41.9 SEPTICEMIA (HCC): ICD-10-CM

## 2019-03-06 LAB
A/G RATIO: 1 (ref 0.8–2)
ABO/RH: NORMAL
ALBUMIN SERPL-MCNC: 3.7 G/DL (ref 3.4–4.8)
ALP BLD-CCNC: 241 U/L (ref 25–100)
ALT SERPL-CCNC: 33 U/L (ref 4–36)
ANION GAP SERPL CALCULATED.3IONS-SCNC: 16 MMOL/L (ref 3–16)
ANTIBODY SCREEN: NORMAL
AST SERPL-CCNC: 33 U/L (ref 8–33)
BACTERIA: ABNORMAL /HPF
BASE EXCESS VENOUS: 8.4 MMOL/L (ref -3–3)
BASOPHILS ABSOLUTE: 0.1 K/UL (ref 0–0.1)
BASOPHILS RELATIVE PERCENT: 0.4 %
BILIRUB SERPL-MCNC: 0.6 MG/DL (ref 0.3–1.2)
BILIRUBIN URINE: NEGATIVE
BLOOD, URINE: ABNORMAL
BUN BLDV-MCNC: 71 MG/DL (ref 6–20)
CALCIUM SERPL-MCNC: 9.9 MG/DL (ref 8.5–10.5)
CHLORIDE BLD-SCNC: 90 MMOL/L (ref 98–107)
CLARITY: ABNORMAL
CO2: 30 MMOL/L (ref 20–30)
COLOR: YELLOW
CREAT SERPL-MCNC: 2.7 MG/DL (ref 0.4–1.2)
EOSINOPHILS ABSOLUTE: 0 K/UL (ref 0–0.4)
EOSINOPHILS RELATIVE PERCENT: 0 %
EPITHELIAL CELLS, UA: ABNORMAL /HPF
GFR AFRICAN AMERICAN: 22
GFR NON-AFRICAN AMERICAN: 18
GLOBULIN: 3.7 G/DL
GLUCOSE BLD-MCNC: 182 MG/DL (ref 74–106)
GLUCOSE URINE: NEGATIVE MG/DL
HCO3 VENOUS: 33.5 MMOL/L (ref 23–29)
HCT VFR BLD CALC: 35 % (ref 37–47)
HEMOGLOBIN: 10.8 G/DL (ref 11.5–16.5)
IMMATURE GRANULOCYTES #: 0.5 K/UL
IMMATURE GRANULOCYTES %: 1.5 % (ref 0–5)
INR BLD: 1.3 (ref 0.94–1.06)
KETONES, URINE: NEGATIVE MG/DL
LACTIC ACID: 3.6 MMOL/L (ref 0.4–2)
LEUKOCYTE ESTERASE, URINE: ABNORMAL
LIPASE: 8 U/L (ref 5.6–51.3)
LYMPHOCYTES ABSOLUTE: 0.5 K/UL (ref 1.5–4)
LYMPHOCYTES RELATIVE PERCENT: 1.4 %
MCH RBC QN AUTO: 26.1 PG (ref 27–32)
MCHC RBC AUTO-ENTMCNC: 30.9 G/DL (ref 31–35)
MCV RBC AUTO: 84.5 FL (ref 80–100)
MICROSCOPIC EXAMINATION: YES
MONOCYTES ABSOLUTE: 0.4 K/UL (ref 0.2–0.8)
MONOCYTES RELATIVE PERCENT: 1.2 %
NEUTROPHILS ABSOLUTE: 30.3 K/UL (ref 2–7.5)
NEUTROPHILS RELATIVE PERCENT: 95.5 %
NITRITE, URINE: POSITIVE
O2 THERAPY: ABNORMAL
PCO2, VEN: 48.5 MMHG (ref 40–50)
PDW BLD-RTO: 19 % (ref 11–16)
PH UA: >=9 (ref 5–8)
PH VENOUS: 7.46 (ref 7.35–7.45)
PLATELET # BLD: 185 K/UL (ref 150–400)
PMV BLD AUTO: 11.3 FL (ref 6–10)
PO2, VEN: 24.8 MMHG (ref 25–40)
POTASSIUM REFLEX MAGNESIUM: 4.8 MMOL/L (ref 3.4–5.1)
PROTEIN UA: >=300 MG/DL
PROTHROMBIN TIME: 12.8 SEC (ref 9.4–10.6)
RAPID INFLUENZA  B AGN: NEGATIVE
RAPID INFLUENZA A AGN: NEGATIVE
RBC # BLD: 4.14 M/UL (ref 3.8–5.8)
RBC UA: ABNORMAL /HPF (ref 0–2)
SODIUM BLD-SCNC: 136 MMOL/L (ref 136–145)
SPECIFIC GRAVITY UA: 1.01 (ref 1–1.03)
TCO2 CALC VENOUS: 35 MMOL/L
TOTAL PROTEIN: 7.4 G/DL (ref 6.4–8.3)
URINE REFLEX TO CULTURE: YES
URINE TYPE: ABNORMAL
UROBILINOGEN, URINE: 0.2 E.U./DL
WBC # BLD: 31.8 K/UL (ref 4–11)
WBC UA: ABNORMAL /HPF (ref 0–5)

## 2019-03-06 PROCEDURE — 87077 CULTURE AEROBIC IDENTIFY: CPT

## 2019-03-06 PROCEDURE — 81001 URINALYSIS AUTO W/SCOPE: CPT

## 2019-03-06 PROCEDURE — 86850 RBC ANTIBODY SCREEN: CPT

## 2019-03-06 PROCEDURE — 36415 COLL VENOUS BLD VENIPUNCTURE: CPT

## 2019-03-06 PROCEDURE — 86901 BLOOD TYPING SEROLOGIC RH(D): CPT

## 2019-03-06 PROCEDURE — 96366 THER/PROPH/DIAG IV INF ADDON: CPT

## 2019-03-06 PROCEDURE — C9113 INJ PANTOPRAZOLE SODIUM, VIA: HCPCS | Performed by: EMERGENCY MEDICINE

## 2019-03-06 PROCEDURE — 71045 X-RAY EXAM CHEST 1 VIEW: CPT

## 2019-03-06 PROCEDURE — 87801 DETECT AGNT MULT DNA AMPLI: CPT

## 2019-03-06 PROCEDURE — 99285 EMERGENCY DEPT VISIT HI MDM: CPT

## 2019-03-06 PROCEDURE — 92511 NASOPHARYNGOSCOPY: CPT

## 2019-03-06 PROCEDURE — 82803 BLOOD GASES ANY COMBINATION: CPT

## 2019-03-06 PROCEDURE — 87186 SC STD MICRODIL/AGAR DIL: CPT

## 2019-03-06 PROCEDURE — 2580000003 HC RX 258: Performed by: EMERGENCY MEDICINE

## 2019-03-06 PROCEDURE — 96368 THER/DIAG CONCURRENT INF: CPT

## 2019-03-06 PROCEDURE — 87040 BLOOD CULTURE FOR BACTERIA: CPT

## 2019-03-06 PROCEDURE — 85025 COMPLETE CBC W/AUTO DIFF WBC: CPT

## 2019-03-06 PROCEDURE — 87086 URINE CULTURE/COLONY COUNT: CPT

## 2019-03-06 PROCEDURE — 6360000002 HC RX W HCPCS: Performed by: EMERGENCY MEDICINE

## 2019-03-06 PROCEDURE — 83690 ASSAY OF LIPASE: CPT

## 2019-03-06 PROCEDURE — 80053 COMPREHEN METABOLIC PANEL: CPT

## 2019-03-06 PROCEDURE — 86900 BLOOD TYPING SEROLOGIC ABO: CPT

## 2019-03-06 PROCEDURE — 87804 INFLUENZA ASSAY W/OPTIC: CPT

## 2019-03-06 PROCEDURE — 96375 TX/PRO/DX INJ NEW DRUG ADDON: CPT

## 2019-03-06 PROCEDURE — 85610 PROTHROMBIN TIME: CPT

## 2019-03-06 PROCEDURE — 96365 THER/PROPH/DIAG IV INF INIT: CPT

## 2019-03-06 PROCEDURE — 83605 ASSAY OF LACTIC ACID: CPT

## 2019-03-06 RX ORDER — POLYETHYLENE GLYCOL 3350 17 G/17G
17 POWDER, FOR SOLUTION ORAL 2 TIMES DAILY
COMMUNITY

## 2019-03-06 RX ORDER — BISOPROLOL FUMARATE 10 MG/1
20 TABLET ORAL 2 TIMES DAILY
Status: ON HOLD | COMMUNITY
End: 2019-04-18 | Stop reason: SDUPTHER

## 2019-03-06 RX ORDER — ISOSORBIDE DINITRATE 10 MG/1
15 TABLET ORAL DAILY
Status: ON HOLD | COMMUNITY
End: 2019-04-18

## 2019-03-06 RX ORDER — PANTOPRAZOLE SODIUM 40 MG/1
40 TABLET, DELAYED RELEASE ORAL DAILY
COMMUNITY
End: 2020-01-29 | Stop reason: SDUPTHER

## 2019-03-06 RX ORDER — DOCUSATE SODIUM 100 MG/1
100 CAPSULE, LIQUID FILLED ORAL 2 TIMES DAILY
COMMUNITY

## 2019-03-06 RX ORDER — AMMONIUM LACTATE 12 G/100G
CREAM TOPICAL DAILY PRN
COMMUNITY
End: 2020-01-29

## 2019-03-06 RX ORDER — SODIUM CHLORIDE, SODIUM LACTATE, POTASSIUM CHLORIDE, AND CALCIUM CHLORIDE .6; .31; .03; .02 G/100ML; G/100ML; G/100ML; G/100ML
30 INJECTION, SOLUTION INTRAVENOUS ONCE
Status: COMPLETED | OUTPATIENT
Start: 2019-03-06 | End: 2019-03-06

## 2019-03-06 RX ORDER — SACCHAROMYCES BOULARDII 250 MG
250 CAPSULE ORAL 2 TIMES DAILY
Status: ON HOLD | COMMUNITY
End: 2020-04-27 | Stop reason: HOSPADM

## 2019-03-06 RX ORDER — ARGININE 500 MG
500 TABLET ORAL DAILY
Status: ON HOLD | COMMUNITY
End: 2020-04-27 | Stop reason: HOSPADM

## 2019-03-06 RX ORDER — ONDANSETRON 2 MG/ML
4 INJECTION INTRAMUSCULAR; INTRAVENOUS EVERY 30 MIN PRN
Status: DISCONTINUED | OUTPATIENT
Start: 2019-03-06 | End: 2019-03-06 | Stop reason: HOSPADM

## 2019-03-06 RX ORDER — ACETAMINOPHEN 325 MG/1
650 TABLET ORAL EVERY 6 HOURS PRN
Status: ON HOLD | COMMUNITY
End: 2020-04-27 | Stop reason: HOSPADM

## 2019-03-06 RX ORDER — ZINC SULFATE 50(220)MG
220 CAPSULE ORAL 2 TIMES DAILY
COMMUNITY
End: 2020-01-29 | Stop reason: SDUPTHER

## 2019-03-06 RX ORDER — BUMETANIDE 2 MG/1
2 TABLET ORAL 2 TIMES DAILY
Status: ON HOLD | COMMUNITY
End: 2019-04-18 | Stop reason: HOSPADM

## 2019-03-06 RX ORDER — IBUPROFEN 200 MG
TABLET ORAL 4 TIMES DAILY
COMMUNITY
End: 2019-04-16

## 2019-03-06 RX ADMIN — VANCOMYCIN HYDROCHLORIDE 1500 MG: 10 INJECTION, POWDER, LYOPHILIZED, FOR SOLUTION INTRAVENOUS at 11:13

## 2019-03-06 RX ADMIN — SODIUM CHLORIDE, POTASSIUM CHLORIDE, SODIUM LACTATE AND CALCIUM CHLORIDE 2000 ML: 600; 310; 30; 20 INJECTION, SOLUTION INTRAVENOUS at 11:12

## 2019-03-06 RX ADMIN — SODIUM CHLORIDE 8 MG/HR: 9 INJECTION, SOLUTION INTRAVENOUS at 11:13

## 2019-03-06 RX ADMIN — CEFTRIAXONE 1 G: 1 INJECTION, POWDER, FOR SOLUTION INTRAMUSCULAR; INTRAVENOUS at 10:33

## 2019-03-06 RX ADMIN — SODIUM CHLORIDE 80 MG: 9 INJECTION, SOLUTION INTRAVENOUS at 10:34

## 2019-03-06 RX ADMIN — ONDANSETRON 4 MG: 2 INJECTION INTRAMUSCULAR; INTRAVENOUS at 11:37

## 2019-03-06 ASSESSMENT — PAIN DESCRIPTION - LOCATION: LOCATION: HEAD

## 2019-03-06 ASSESSMENT — PAIN DESCRIPTION - DESCRIPTORS: DESCRIPTORS: ACHING

## 2019-03-06 ASSESSMENT — PAIN SCALES - GENERAL: PAINLEVEL_OUTOF10: 10

## 2019-03-07 LAB — REPORT: NORMAL

## 2019-03-08 LAB
BLOOD CULTURE, ROUTINE: ABNORMAL
CULTURE, BLOOD 2: ABNORMAL
CULTURE, BLOOD 2: ABNORMAL
ORGANISM: ABNORMAL

## 2019-03-09 LAB
ORGANISM: ABNORMAL
ORGANISM: ABNORMAL
URINE CULTURE, ROUTINE: ABNORMAL

## 2019-03-14 ENCOUNTER — TELEPHONE (OUTPATIENT)
Dept: PRIMARY CARE CLINIC | Age: 59
End: 2019-03-14

## 2019-03-14 DIAGNOSIS — E11.42 DIABETIC POLYNEUROPATHY ASSOCIATED WITH TYPE 2 DIABETES MELLITUS (HCC): ICD-10-CM

## 2019-03-14 RX ORDER — GABAPENTIN 300 MG/1
300 CAPSULE ORAL 2 TIMES DAILY
Qty: 60 CAPSULE | Refills: 1 | Status: ON HOLD | OUTPATIENT
Start: 2019-03-14 | End: 2019-04-18 | Stop reason: SDUPTHER

## 2019-03-15 ENCOUNTER — HOSPITAL ENCOUNTER (OUTPATIENT)
Facility: HOSPITAL | Age: 59
Discharge: HOME | End: 2019-03-15
Payer: COMMERCIAL

## 2019-03-15 LAB
A/G RATIO: 1.2 (ref 0.8–2)
ALBUMIN SERPL-MCNC: 3.7 G/DL (ref 3.4–4.8)
ALP BLD-CCNC: 149 U/L (ref 25–100)
ALT SERPL-CCNC: 6 U/L (ref 4–36)
ANION GAP SERPL CALCULATED.3IONS-SCNC: 14 MMOL/L (ref 3–16)
AST SERPL-CCNC: 9 U/L (ref 8–33)
BASOPHILS ABSOLUTE: 0.1 K/UL (ref 0–0.1)
BASOPHILS RELATIVE PERCENT: 0.5 %
BILIRUB SERPL-MCNC: 0.3 MG/DL (ref 0.3–1.2)
BUN BLDV-MCNC: 23 MG/DL (ref 6–20)
CALCIUM SERPL-MCNC: 9.3 MG/DL (ref 8.5–10.5)
CHLORIDE BLD-SCNC: 97 MMOL/L (ref 98–107)
CO2: 27 MMOL/L (ref 20–30)
CREAT SERPL-MCNC: 1.4 MG/DL (ref 0.4–1.2)
EOSINOPHILS ABSOLUTE: 0.4 K/UL (ref 0–0.4)
EOSINOPHILS RELATIVE PERCENT: 4.5 %
GFR AFRICAN AMERICAN: 46
GFR NON-AFRICAN AMERICAN: 38
GLOBULIN: 3 G/DL
GLUCOSE BLD-MCNC: 222 MG/DL (ref 74–106)
HCT VFR BLD CALC: 37.2 % (ref 37–47)
HEMOGLOBIN: 11.5 G/DL (ref 11.5–16.5)
IMMATURE GRANULOCYTES #: 0.3 K/UL
IMMATURE GRANULOCYTES %: 3.4 % (ref 0–5)
LYMPHOCYTES ABSOLUTE: 1.5 K/UL (ref 1.5–4)
LYMPHOCYTES RELATIVE PERCENT: 15.3 %
MCH RBC QN AUTO: 26 PG (ref 27–32)
MCHC RBC AUTO-ENTMCNC: 30.9 G/DL (ref 31–35)
MCV RBC AUTO: 84.2 FL (ref 80–100)
MONOCYTES ABSOLUTE: 0.6 K/UL (ref 0.2–0.8)
MONOCYTES RELATIVE PERCENT: 6.2 %
NEUTROPHILS ABSOLUTE: 6.8 K/UL (ref 2–7.5)
NEUTROPHILS RELATIVE PERCENT: 70.1 %
PDW BLD-RTO: 19.3 % (ref 11–16)
PLATELET # BLD: 425 K/UL (ref 150–400)
PMV BLD AUTO: 10.4 FL (ref 6–10)
POTASSIUM SERPL-SCNC: 4.5 MMOL/L (ref 3.4–5.1)
RBC # BLD: 4.42 M/UL (ref 3.8–5.8)
SODIUM BLD-SCNC: 138 MMOL/L (ref 136–145)
TOTAL PROTEIN: 6.7 G/DL (ref 6.4–8.3)
WBC # BLD: 9.7 K/UL (ref 4–11)

## 2019-03-15 PROCEDURE — 85025 COMPLETE CBC W/AUTO DIFF WBC: CPT

## 2019-03-15 PROCEDURE — 80053 COMPREHEN METABOLIC PANEL: CPT

## 2019-03-18 RX ORDER — MECLIZINE HYDROCHLORIDE 25 MG/1
TABLET ORAL
Qty: 30 TABLET | Refills: 5 | Status: SHIPPED | OUTPATIENT
Start: 2019-03-18 | End: 2019-04-16

## 2019-04-04 RX ORDER — PNV NO.95/FERROUS FUM/FOLIC AC 28MG-0.8MG
TABLET ORAL
Qty: 60 TABLET | Refills: 5 | Status: ON HOLD | OUTPATIENT
Start: 2019-04-04 | End: 2020-04-27 | Stop reason: HOSPADM

## 2019-04-16 ENCOUNTER — HOSPITAL ENCOUNTER (INPATIENT)
Facility: HOSPITAL | Age: 59
LOS: 2 days | Discharge: NURSING, REHAB OR LONG-TERM CARE | DRG: 683 | End: 2019-04-18
Attending: EMERGENCY MEDICINE | Admitting: INTERNAL MEDICINE
Payer: MEDICARE

## 2019-04-16 ENCOUNTER — HOSPITAL ENCOUNTER (OUTPATIENT)
Facility: HOSPITAL | Age: 59
Discharge: HOME | End: 2019-04-16
Payer: MEDICARE

## 2019-04-16 DIAGNOSIS — N28.9 ACUTE RENAL INSUFFICIENCY: Primary | ICD-10-CM

## 2019-04-16 DIAGNOSIS — I10 HYPERTENSION, UNSPECIFIED TYPE: ICD-10-CM

## 2019-04-16 DIAGNOSIS — N19 RENAL FAILURE, UNSPECIFIED CHRONICITY: ICD-10-CM

## 2019-04-16 DIAGNOSIS — E11.42 DIABETIC POLYNEUROPATHY ASSOCIATED WITH TYPE 2 DIABETES MELLITUS (HCC): ICD-10-CM

## 2019-04-16 PROBLEM — N17.9 ARF (ACUTE RENAL FAILURE) (HCC): Status: ACTIVE | Noted: 2019-04-16

## 2019-04-16 LAB
A/G RATIO: 1 (ref 0.8–2)
A/G RATIO: 1.1 (ref 0.8–2)
ALBUMIN SERPL-MCNC: 4.2 G/DL (ref 3.4–4.8)
ALBUMIN SERPL-MCNC: 4.2 G/DL (ref 3.4–4.8)
ALP BLD-CCNC: 131 U/L (ref 25–100)
ALP BLD-CCNC: 132 U/L (ref 25–100)
ALT SERPL-CCNC: 11 U/L (ref 4–36)
ALT SERPL-CCNC: 12 U/L (ref 4–36)
ANION GAP SERPL CALCULATED.3IONS-SCNC: 16 MMOL/L (ref 3–16)
ANION GAP SERPL CALCULATED.3IONS-SCNC: 17 MMOL/L (ref 3–16)
AST SERPL-CCNC: 13 U/L (ref 8–33)
AST SERPL-CCNC: 14 U/L (ref 8–33)
BASOPHILS ABSOLUTE: 0 K/UL (ref 0–0.1)
BASOPHILS RELATIVE PERCENT: 0.4 %
BILIRUB SERPL-MCNC: 0.3 MG/DL (ref 0.3–1.2)
BILIRUB SERPL-MCNC: 0.3 MG/DL (ref 0.3–1.2)
BUN BLDV-MCNC: 141 MG/DL (ref 6–20)
BUN BLDV-MCNC: 146 MG/DL (ref 6–20)
CALCIUM SERPL-MCNC: 10.5 MG/DL (ref 8.5–10.5)
CALCIUM SERPL-MCNC: 10.6 MG/DL (ref 8.5–10.5)
CHLORIDE BLD-SCNC: 90 MMOL/L (ref 98–107)
CHLORIDE BLD-SCNC: 90 MMOL/L (ref 98–107)
CO2: 28 MMOL/L (ref 20–30)
CO2: 28 MMOL/L (ref 20–30)
CREAT SERPL-MCNC: 2.4 MG/DL (ref 0.4–1.2)
CREAT SERPL-MCNC: 2.4 MG/DL (ref 0.4–1.2)
EOSINOPHILS ABSOLUTE: 0.4 K/UL (ref 0–0.4)
EOSINOPHILS RELATIVE PERCENT: 4.8 %
GFR AFRICAN AMERICAN: 25
GFR AFRICAN AMERICAN: 25
GFR NON-AFRICAN AMERICAN: 21
GFR NON-AFRICAN AMERICAN: 21
GLOBULIN: 3.8 G/DL
GLOBULIN: 4.4 G/DL
GLUCOSE BLD-MCNC: 311 MG/DL (ref 74–106)
GLUCOSE BLD-MCNC: 331 MG/DL (ref 74–106)
GLUCOSE BLD-MCNC: 355 MG/DL (ref 74–106)
HCT VFR BLD CALC: 37.1 % (ref 37–47)
HEMOGLOBIN: 11.5 G/DL (ref 11.5–16.5)
IMMATURE GRANULOCYTES #: 0 K/UL
IMMATURE GRANULOCYTES %: 0.4 % (ref 0–5)
LYMPHOCYTES ABSOLUTE: 1.6 K/UL (ref 1.5–4)
LYMPHOCYTES RELATIVE PERCENT: 21.2 %
MCH RBC QN AUTO: 25.1 PG (ref 27–32)
MCHC RBC AUTO-ENTMCNC: 31 G/DL (ref 31–35)
MCV RBC AUTO: 81 FL (ref 80–100)
MONOCYTES ABSOLUTE: 0.4 K/UL (ref 0.2–0.8)
MONOCYTES RELATIVE PERCENT: 5.6 %
NEUTROPHILS ABSOLUTE: 5.1 K/UL (ref 2–7.5)
NEUTROPHILS RELATIVE PERCENT: 67.6 %
PDW BLD-RTO: 18.3 % (ref 11–16)
PERFORMED ON: ABNORMAL
PLATELET # BLD: 281 K/UL (ref 150–400)
PMV BLD AUTO: 10.8 FL (ref 6–10)
POTASSIUM SERPL-SCNC: 3.9 MMOL/L (ref 3.4–5.1)
POTASSIUM SERPL-SCNC: 4.3 MMOL/L (ref 3.4–5.1)
RBC # BLD: 4.58 M/UL (ref 3.8–5.8)
SODIUM BLD-SCNC: 134 MMOL/L (ref 136–145)
SODIUM BLD-SCNC: 135 MMOL/L (ref 136–145)
TOTAL CK: 26 U/L (ref 26–174)
TOTAL PROTEIN: 8 G/DL (ref 6.4–8.3)
TOTAL PROTEIN: 8.6 G/DL (ref 6.4–8.3)
TROPONIN: <0.3 NG/ML
TSH SERPL DL<=0.05 MIU/L-ACNC: 2.01 UIU/ML (ref 0.35–5.5)
WBC # BLD: 7.5 K/UL (ref 4–11)

## 2019-04-16 PROCEDURE — 2580000003 HC RX 258: Performed by: INTERNAL MEDICINE

## 2019-04-16 PROCEDURE — 96360 HYDRATION IV INFUSION INIT: CPT

## 2019-04-16 PROCEDURE — 99285 EMERGENCY DEPT VISIT HI MDM: CPT

## 2019-04-16 PROCEDURE — 6370000000 HC RX 637 (ALT 250 FOR IP): Performed by: INTERNAL MEDICINE

## 2019-04-16 PROCEDURE — 84443 ASSAY THYROID STIM HORMONE: CPT

## 2019-04-16 PROCEDURE — 85025 COMPLETE CBC W/AUTO DIFF WBC: CPT

## 2019-04-16 PROCEDURE — 96361 HYDRATE IV INFUSION ADD-ON: CPT

## 2019-04-16 PROCEDURE — 36415 COLL VENOUS BLD VENIPUNCTURE: CPT

## 2019-04-16 PROCEDURE — 80053 COMPREHEN METABOLIC PANEL: CPT

## 2019-04-16 PROCEDURE — 82550 ASSAY OF CK (CPK): CPT

## 2019-04-16 PROCEDURE — 93005 ELECTROCARDIOGRAM TRACING: CPT

## 2019-04-16 PROCEDURE — 1200000000 HC SEMI PRIVATE

## 2019-04-16 PROCEDURE — 84484 ASSAY OF TROPONIN QUANT: CPT

## 2019-04-16 PROCEDURE — 2580000003 HC RX 258: Performed by: EMERGENCY MEDICINE

## 2019-04-16 RX ORDER — GABAPENTIN 300 MG/1
300 CAPSULE ORAL 2 TIMES DAILY
Status: DISCONTINUED | OUTPATIENT
Start: 2019-04-16 | End: 2019-04-18 | Stop reason: HOSPADM

## 2019-04-16 RX ORDER — DEXTROSE MONOHYDRATE 50 MG/ML
100 INJECTION, SOLUTION INTRAVENOUS PRN
Status: DISCONTINUED | OUTPATIENT
Start: 2019-04-16 | End: 2019-04-18 | Stop reason: HOSPADM

## 2019-04-16 RX ORDER — M-VIT,TX,IRON,MINS/CALC/FOLIC 27MG-0.4MG
1 TABLET ORAL DAILY
Status: DISCONTINUED | OUTPATIENT
Start: 2019-04-17 | End: 2019-04-18 | Stop reason: HOSPADM

## 2019-04-16 RX ORDER — SIMVASTATIN 20 MG
20 TABLET ORAL NIGHTLY
Status: DISCONTINUED | OUTPATIENT
Start: 2019-04-16 | End: 2019-04-18 | Stop reason: HOSPADM

## 2019-04-16 RX ORDER — SODIUM CHLORIDE 0.9 % (FLUSH) 0.9 %
10 SYRINGE (ML) INJECTION PRN
Status: DISCONTINUED | OUTPATIENT
Start: 2019-04-16 | End: 2019-04-18 | Stop reason: HOSPADM

## 2019-04-16 RX ORDER — FERROUS SULFATE TAB EC 324 MG (65 MG FE EQUIVALENT) 324 (65 FE) MG
324 TABLET DELAYED RESPONSE ORAL 2 TIMES DAILY
Status: DISCONTINUED | OUTPATIENT
Start: 2019-04-16 | End: 2019-04-18 | Stop reason: HOSPADM

## 2019-04-16 RX ORDER — HYDRALAZINE HYDROCHLORIDE 10 MG/1
10 TABLET, FILM COATED ORAL 2 TIMES DAILY
Status: DISCONTINUED | OUTPATIENT
Start: 2019-04-16 | End: 2019-04-18 | Stop reason: HOSPADM

## 2019-04-16 RX ORDER — 0.9 % SODIUM CHLORIDE 0.9 %
1000 INTRAVENOUS SOLUTION INTRAVENOUS ONCE
Status: COMPLETED | OUTPATIENT
Start: 2019-04-16 | End: 2019-04-16

## 2019-04-16 RX ORDER — ISOSORBIDE DINITRATE 10 MG/1
15 TABLET ORAL DAILY
Status: DISCONTINUED | OUTPATIENT
Start: 2019-04-17 | End: 2019-04-18 | Stop reason: ALTCHOICE

## 2019-04-16 RX ORDER — ZINC SULFATE 50(220)MG
220 CAPSULE ORAL 2 TIMES DAILY
Status: DISCONTINUED | OUTPATIENT
Start: 2019-04-16 | End: 2019-04-18 | Stop reason: HOSPADM

## 2019-04-16 RX ORDER — DEXTROSE MONOHYDRATE 25 G/50ML
12.5 INJECTION, SOLUTION INTRAVENOUS PRN
Status: DISCONTINUED | OUTPATIENT
Start: 2019-04-16 | End: 2019-04-18 | Stop reason: HOSPADM

## 2019-04-16 RX ORDER — LIDOCAINE 50 MG/G
1 PATCH TOPICAL DAILY
Status: ON HOLD | COMMUNITY
End: 2020-04-27 | Stop reason: HOSPADM

## 2019-04-16 RX ORDER — ACETAMINOPHEN 325 MG/1
650 TABLET ORAL EVERY 6 HOURS PRN
Status: DISCONTINUED | OUTPATIENT
Start: 2019-04-16 | End: 2019-04-18 | Stop reason: HOSPADM

## 2019-04-16 RX ORDER — ONDANSETRON 2 MG/ML
4 INJECTION INTRAMUSCULAR; INTRAVENOUS EVERY 6 HOURS PRN
Status: DISCONTINUED | OUTPATIENT
Start: 2019-04-16 | End: 2019-04-18 | Stop reason: HOSPADM

## 2019-04-16 RX ORDER — LEVOTHYROXINE SODIUM 0.07 MG/1
150 TABLET ORAL DAILY
Status: DISCONTINUED | OUTPATIENT
Start: 2019-04-17 | End: 2019-04-18 | Stop reason: HOSPADM

## 2019-04-16 RX ORDER — POLYETHYLENE GLYCOL 3350 17 G/17G
17 POWDER, FOR SOLUTION ORAL 2 TIMES DAILY
Status: DISCONTINUED | OUTPATIENT
Start: 2019-04-16 | End: 2019-04-18 | Stop reason: HOSPADM

## 2019-04-16 RX ORDER — DOCUSATE SODIUM 100 MG/1
100 CAPSULE, LIQUID FILLED ORAL 2 TIMES DAILY
Status: DISCONTINUED | OUTPATIENT
Start: 2019-04-16 | End: 2019-04-18 | Stop reason: HOSPADM

## 2019-04-16 RX ORDER — ASCORBIC ACID 500 MG
500 TABLET ORAL DAILY
Status: DISCONTINUED | OUTPATIENT
Start: 2019-04-17 | End: 2019-04-18 | Stop reason: HOSPADM

## 2019-04-16 RX ORDER — SODIUM CHLORIDE 0.9 % (FLUSH) 0.9 %
10 SYRINGE (ML) INJECTION EVERY 12 HOURS SCHEDULED
Status: DISCONTINUED | OUTPATIENT
Start: 2019-04-16 | End: 2019-04-18 | Stop reason: HOSPADM

## 2019-04-16 RX ORDER — ARGININE 500 MG
500 TABLET ORAL DAILY
Status: DISCONTINUED | OUTPATIENT
Start: 2019-04-17 | End: 2019-04-18 | Stop reason: HOSPADM

## 2019-04-16 RX ORDER — SODIUM CHLORIDE 9 MG/ML
INJECTION, SOLUTION INTRAVENOUS CONTINUOUS
Status: DISCONTINUED | OUTPATIENT
Start: 2019-04-16 | End: 2019-04-16

## 2019-04-16 RX ORDER — SODIUM CHLORIDE 9 MG/ML
INJECTION, SOLUTION INTRAVENOUS ONCE
Status: COMPLETED | OUTPATIENT
Start: 2019-04-16 | End: 2019-04-16

## 2019-04-16 RX ORDER — ASPIRIN 81 MG/1
81 TABLET ORAL DAILY
Status: DISCONTINUED | OUTPATIENT
Start: 2019-04-17 | End: 2019-04-18 | Stop reason: ALTCHOICE

## 2019-04-16 RX ORDER — NICOTINE POLACRILEX 4 MG
15 LOZENGE BUCCAL PRN
Status: DISCONTINUED | OUTPATIENT
Start: 2019-04-16 | End: 2019-04-18 | Stop reason: HOSPADM

## 2019-04-16 RX ORDER — PANTOPRAZOLE SODIUM 40 MG/1
40 TABLET, DELAYED RELEASE ORAL DAILY
Status: DISCONTINUED | OUTPATIENT
Start: 2019-04-17 | End: 2019-04-18 | Stop reason: HOSPADM

## 2019-04-16 RX ADMIN — INSULIN LISPRO 3 UNITS: 100 INJECTION, SOLUTION INTRAVENOUS; SUBCUTANEOUS at 23:45

## 2019-04-16 RX ADMIN — METOPROLOL TARTRATE 25 MG: 25 TABLET ORAL at 23:39

## 2019-04-16 RX ADMIN — SIMVASTATIN 20 MG: 20 TABLET, FILM COATED ORAL at 23:39

## 2019-04-16 RX ADMIN — GABAPENTIN 300 MG: 300 CAPSULE ORAL at 23:39

## 2019-04-16 RX ADMIN — SODIUM CHLORIDE: 9 INJECTION, SOLUTION INTRAVENOUS at 23:41

## 2019-04-16 RX ADMIN — SODIUM CHLORIDE 1000 ML: 9 INJECTION, SOLUTION INTRAVENOUS at 17:17

## 2019-04-16 RX ADMIN — HYDRALAZINE HYDROCHLORIDE 10 MG: 10 TABLET, FILM COATED ORAL at 23:39

## 2019-04-16 RX ADMIN — FERROUS SULFATE TAB EC 324 MG (65 MG FE EQUIVALENT) 324 MG: 324 (65 FE) TABLET DELAYED RESPONSE at 23:39

## 2019-04-16 RX ADMIN — DOCUSATE SODIUM 100 MG: 100 CAPSULE, LIQUID FILLED ORAL at 23:39

## 2019-04-16 RX ADMIN — SODIUM CHLORIDE: 9 INJECTION, SOLUTION INTRAVENOUS at 18:55

## 2019-04-16 RX ADMIN — ZINC SULFATE 220 MG (50 MG) CAPSULE 220 MG: CAPSULE at 23:39

## 2019-04-16 ASSESSMENT — ENCOUNTER SYMPTOMS
SINUS PRESSURE: 0
SHORTNESS OF BREATH: 0
EYE PAIN: 0
COUGH: 0
TROUBLE SWALLOWING: 0
VOMITING: 0
RHINORRHEA: 0
NAUSEA: 0
DIARRHEA: 0
EYE DISCHARGE: 0
CHEST TIGHTNESS: 0
CONSTIPATION: 0
WHEEZING: 0
EYE REDNESS: 0
SORE THROAT: 0
BACK PAIN: 0
ABDOMINAL PAIN: 0

## 2019-04-16 NOTE — ED NOTES
Dr. Kyrie Contreras called. .. Transferred call to Dr. Osiris Hinds.      [address]  04/16/19 1941

## 2019-04-16 NOTE — ED NOTES
[address] [facility] in [place] and requested to speak to a cardiologist. They stated they would page Dr. Bazzi.      [address]  04/16/19 1941

## 2019-04-16 NOTE — ED NOTES
[facility]ile technique utilized. Placed without difficulty with immediate return of clear yellow urine.      Kacie Holt RN  04/16/19 8382

## 2019-04-16 NOTE — ED TRIAGE NOTES
Patient arrives with  [facility] for abnormal BUN level. Patient denies any problems, has no needs or concerns voiced.  Patient denies feeling bad or any different than usual.

## 2019-04-16 NOTE — ED PROVIDER NOTES
[address] [place] [address] Court  eMERGENCY dEPARTMENT eNCOUnter      Pt Name: Melvi Boyd  MRN: [id number]  [place] 1960  Date of evaluation: 4/16/2019  Provider: Kasie Stoddard MD    [address] [place] [address]       Chief Complaint   Patient presents with    Abnormal Lab         HISTORY OF PRESENT ILLNESS   (Location/Symptom, Timing/Onset, Context/Setting, Quality, Duration, Modifying Factors, Severity)  Note limiting factors. Melvi Boyd is a 59 y.o. female who presents to the emergency department because of elevated BUN/creatinine drawn at the NH this am. Patient has no complaints otherwise. No N/V/D. Nursing Notes were reviewed. REVIEW OF SYSTEMS    (2-9 systems for level 4, 10 or more forlevel 5)     Review of Systems   Constitutional: Negative for chills and fever. HENT: Negative for congestion, ear pain, postnasal drip, rhinorrhea, sinus pressure, sneezing, sore throat and trouble swallowing. Eyes: Negative for pain, discharge and redness. Respiratory: Negative for cough, chest tightness, shortness of breath and wheezing. Cardiovascular: Negative for chest pain, palpitations and leg swelling. Gastrointestinal: Negative for abdominal pain, constipation, diarrhea, nausea and vomiting. Genitourinary: Negative for dysuria, flank pain, frequency, hematuria and urgency. Musculoskeletal: Negative for back pain, joint swelling and neck pain. Skin: Negative for pallor and rash. Neurological: Negative for dizziness, syncope, weakness, numbness and headaches. Psychiatric/Behavioral: Negative for confusion and hallucinations. The patient is not nervous/anxious.             PAST MEDICAL HISTORY     Past Medical History:   Diagnosis Date    Acute metabolic encephalopathy     Acute renal failure (ARF) (HCC)     Anemia     Chronic venous insufficiency     GERD (gastroesophageal reflux disease) 12/2013    Severe Reflux Esophagitis and hemorrhagic gastritis    Headache(784.0)     Hypertension     Hypothyroidism     Thrombocytopenia ([facility] [address]     Type II or unspecified type diabetes mellitus without mention of complication, not stated as uncontrolled     Vitamin B12 deficiency          SURGICAL HISTORY       Past Surgical History:   Procedure Laterality Date    EYE SURGERY      LEG SURGERY      DE COLONOSCOPY FLX DX W/COLLJ SPEC WHEN PFRMD N/A 10/5/2018    COLONOSCOPY DIAGNOSTIC OR SCREENING performed by Marcello Callejas MD at [address] EGD TRANSORAL BIOPSY SINGLE/MULTIPLE N/A 10/5/2018    EGD BIOPSY performed by Marcello Callejas MD at [address] [facility]       Previous Medications    ACETAMINOPHEN (TYLENOL) 325 MG TABLET    Take 650 mg by mouth every 6 hours as needed for Pain    AMMONIUM LACTATE (AMLACTIN) 12 % CREAM    Apply topically 2 times daily as needed for Dry Skin Apply topically as needed. APIXABAN (ELIQUIS) 5 MG TABS TABLET    Take 5 mg by mouth 2 times daily    ARGININE 500 MG TABLET    Take 500 mg by mouth daily    ASPIRIN 81 MG EC TABLET    Take 81 mg by mouth daily    BISACODYL (DULCOLAX) 5 MG EC TABLET    Take 10 mg by mouth daily as needed for Constipation    BISOPROLOL (ZEBETA) 10 MG TABLET    Take 10 mg by mouth 2 times daily    BLOOD GLUCOSE TEST STRIPS (FREESTYLE TEST STRIPS) STRIP    Daily dx:250.00    BUMETANIDE (BUMEX) 2 MG TABLET    Take 2 mg by mouth 2 times daily    DOCUSATE SODIUM (COLACE) 100 MG CAPSULE    Take 100 mg by mouth 2 times daily    FERROUS SULFATE (IRON) 325 (65 FE) MG TABS    take 1 tablet by mouth twice a day with food    GABAPENTIN (NEURONTIN) 300 MG CAPSULE    Take 1 capsule by mouth 2 times daily for 180 days.  Intended supply: 30 days    GLUCOSE MONITORING KIT (FREESTYLE) MONITORING KIT    1 kit by Does not apply route daily as needed (elevated glucose) Dx e 11.9    HYDRALAZINE (APRESOLINE) 25 MG TABLET    Take 10 mg by mouth 2 times daily     INSULIN ASPART (NOVOLOG) 100 UNIT/ML INJECTION VIAL    Inject into the skin 4 times daily (after meals and at bedtime) Per sliding scale protocol    INSULIN PEN NEEDLE 32G X 4 MM MISC    1 each by Does not apply route daily Dx: DM2    ISOSORBIDE DINITRATE (ISORDIL) 10 MG TABLET    Take 15 mg by mouth daily    LEVOTHYROXINE (SYNTHROID) 50 MCG TABLET    Take 1 tablet by mouth Daily    LIDOCAINE (LIDODERM) 5 %    Place 1 patch onto the skin daily Apply to left knee. 12 hours on, 12 hours off. MECLIZINE (ANTIVERT) 25 MG TABLET    take 1 tablet by mouth three times a day if needed for DIZZINESS AND MOTION SICKNESS    MICONAZOLE (MICOTIN) 2 % CREAM    Apply topically daily Cleanse area to L upper breast with DWC, than apply antifungal cream and cover with dry dressing every day. Cleanse area to R upper posterior thigh with DWC then apply antifungal cream every day. Cleanse area to R medial leg with DWC then apply antifungal cream and cover with silver alginate then cover with dry dressing every day. MULTIPLE VITAMINS-MINERALS (THERAPEUTIC MULTIVITAMIN-MINERALS) TABLET    Take 1 tablet by mouth daily    NEOMYCIN-BACITRACIN-POLYMYXIN (NEOSPORIN) 5-400-5000 OINTMENT    Apply topically 4 times daily Apply topically 4 times daily.     PANTOPRAZOLE (PROTONIX) 40 MG TABLET    Take 40 mg by mouth daily    POLYETHYLENE GLYCOL (MIRALAX) POWDER    Take 17 g by mouth 2 times daily    RA VITAMIN C 500 MG TABLET    TAKE 1 TABLET BY MOUTH DAILY    SACCHAROMYCES BOULARDII (FLORASTOR) 250 MG CAPSULE    Take 250 mg by mouth 2 times daily    SIMVASTATIN (ZOCOR) 20 MG TABLET    take 1 tablet by mouth at bedtime    ZINC SULFATE (ZINCATE) 220 (50 ZN) MG CAPSULE    Take 220 mg by mouth 2 times daily       ALLERGIES     Metformin and related    FAMILY HISTORY       Family History   Problem Relation Age of Onset    Asthma Mother     High Blood Pressure Father     Stroke Father           SOCIAL HISTORY       Social History     Socioeconomic History    Marital status: Single     Spouse name: None  Number of children: None    Years of education: None    Highest education level: None   Occupational History    None   Social Needs    Financial resource strain: None    Food insecurity:     Worry: None     Inability: None    Transportation needs:     Medical: None     Non-medical: None   Tobacco Use    Smoking status: Never Smoker    Smokeless tobacco: Never Used   Substance and Sexual Activity    Alcohol use: No     Alcohol/week: 0.0 oz    Drug use: No    Sexual activity: None   Lifestyle    Physical activity:     Days per week: None     Minutes per session: None    Stress: None   Relationships    Social connections:     Talks on phone: None     Gets together: None     Attends Mandaeism service: None     Active member of club or organization: None     Attends meetings of clubs or organizations: None     Relationship status: None    Intimate partner violence:     Fear of current or ex partner: None     Emotionally abused: None     Physically abused: None     Forced sexual activity: None   Other Topics Concern    None   Social History Narrative    None       SCREENINGS             PHYSICAL EXAM    (up to 7 for level 4, 8 or more for level 5)     ED Triage Vitals   BP Temp Temp Source Pulse Resp SpO2 Height Weight   04/16/19 1657 04/16/19 1659 04/16/19 1659 04/16/19 1657 04/16/19 1657 04/16/19 1657 04/16/19 1657 04/16/19 1657   114/75 98.2 °F (36.8 °C) Oral 108 20 98 % 5' 9\" (1.753 m) 300 lb (136.1 kg)       Physical Exam   Constitutional: She is oriented to person, place, and time. She appears well-developed and well-nourished. HENT:   Head: Normocephalic and atraumatic. Eyes: Pupils are equal, round, and reactive to light. Conjunctivae and EOM are normal.   Neck: Neck supple. Cardiovascular: Normal rate and regular rhythm. Pulmonary/Chest: Effort normal and breath sounds normal.   Abdominal: Soft. Bowel sounds are normal.   Genitourinary: Rectal exam shows guaiac negative stool. Musculoskeletal: Normal range of motion. Neurological: She is alert and oriented to person, place, and time. Skin: Skin is warm and dry. Psychiatric: She has a normal mood and affect. DIAGNOSTIC RESULTS     EKG: All EKG's are interpreted by the Emergency Department Physician who either signs or Co-signs this chart in the absence of a cardiologist.    EKG showed sinus tachy at 107 bpm. Called and texted EKG to Dr Adeola Dubose at [facility] and confirms that EKG does not show an acute MI. Troponin drawn negative.     RADIOLOGY:   Non-plain film images such as CT, Ultrasound and MRI are read by the radiologist. [facility] radiographic images are visualized andpreliminarily interpreted by the emergency physician with the below findings:        Interpretationper the Radiologist below, if available at the time of this note:    No orders to display         ED BEDSIDE ULTRASOUND:   Performed by ED Physician - none    LABS:  Labs Reviewed   CBC WITH AUTO DIFFERENTIAL - Abnormal; Notable for the following components:       Result Value    MCH 25.1 (*)     RDW 18.3 (*)     MPV 10.8 (*)     All other components within normal limits    Narrative:     Performed at:  [address] [place] [address] Laboratory  [address] [place] [address],  [place], [place] [address]   Phone [phone]   COMPREHENSIVE METABOLIC PANEL - Abnormal; Notable for the following components:    Sodium 134 (*)     Chloride 90 (*)     Glucose 311 (*)      (*)     CREATININE 2.4 (*)     GFR Non- 21 (*)     GFR  25 (*)     Calcium 10.6 (*)     Total Protein 8.6 (*)     Alkaline Phosphatase 131 (*)     All other components within normal limits    Narrative:     Carmelina Webb tel. [phone],  Chemistry results called to and read back by Marlin Regalado, 04/16/2019 17:57,  by CHRIS  Performed at:  [address] [place] [address],  [place], [place] [address]   Phone [phone]       All other labs were within normal range or not returned as of this dictation. EMERGENCY DEPARTMENT COURSE and DIFFERENTIAL DIAGNOSIS/MDM:   Vitals:    Vitals:    04/16/19 1730 04/16/19 1745 04/16/19 1800 04/16/19 1815   BP: 98/64 100/67 113/75 119/72   Pulse: 108 109 103 105   Resp:       Temp:       TempSrc:       SpO2: 98% 97% 98% 97%   Weight:       Height:               CRITICAL CARE TIME   Total Critical Care time was  minutes, excluding separatelyreportable procedures. There was a high probability ofclinically significant/life threatening deterioration in the patient's condition which required my urgent intervention. CONSULTS:  None    PROCEDURES:  None    PROGRESS NOTES:    Called and discussed patient with Dr Alexis Soto and he's accepted patient for admission. Dr Alexis Soto informed of EKG and troponin results and reassured him that patient completely asymptomatic and continues to have no complaints. FINAL IMPRESSION      1. Acute renal insufficiency          Final diagnoses:   Acute renal insufficiency       DISPOSITION/PLAN   DISPOSITION Decision To Admit 04/16/2019 06:51:12 PM      PATIENT REFERRED TO:  No follow-up provider specified.     DISCHARGE MEDICATIONS:  New Prescriptions    No medications on file         (Please note thatportions of this note may have been completed with a voice recognition program.  Efforts were [facility] edit the dictations but occasionally words are mis-transcribed.)    Dontae Juárez MD (electronically signed)  Attending Emergency Physician        Espinoza Newsome MD  04/16/19 9026       Espinoza Newsome MD  04/16/19 5016

## 2019-04-16 NOTE — ED NOTES
Dr Tamia Gilman called and requests f/c be placed. Clamp if immediate return of urine at least 600 ml.      Maye Fisher RN  04/16/19 8697

## 2019-04-16 NOTE — ED NOTES
Patient to be evaluated in ED for elevated . Report called by Tyler Arroyo at [facility].      Thor Mcmillan RN  04/16/19 3052

## 2019-04-17 ENCOUNTER — APPOINTMENT (OUTPATIENT)
Dept: ULTRASOUND IMAGING | Facility: HOSPITAL | Age: 59
DRG: 683 | End: 2019-04-17
Payer: MEDICARE

## 2019-04-17 PROBLEM — N30.00 ACUTE CYSTITIS: Status: ACTIVE | Noted: 2019-04-17

## 2019-04-17 LAB
ANION GAP SERPL CALCULATED.3IONS-SCNC: 14 MMOL/L (ref 3–16)
BACTERIA: ABNORMAL /HPF
BASOPHILS ABSOLUTE: 0 K/UL (ref 0–0.1)
BASOPHILS RELATIVE PERCENT: 0.4 %
BILIRUBIN URINE: NEGATIVE
BLOOD, URINE: ABNORMAL
BUN BLDV-MCNC: 138 MG/DL (ref 6–20)
CALCIUM SERPL-MCNC: 9.9 MG/DL (ref 8.5–10.5)
CHLORIDE BLD-SCNC: 95 MMOL/L (ref 98–107)
CLARITY: ABNORMAL
CO2: 27 MMOL/L (ref 20–30)
COLOR: YELLOW
CREAT SERPL-MCNC: 2.4 MG/DL (ref 0.4–1.2)
EOSINOPHILS ABSOLUTE: 0.3 K/UL (ref 0–0.4)
EOSINOPHILS RELATIVE PERCENT: 4.9 %
EPITHELIAL CELLS, UA: ABNORMAL /HPF
GFR AFRICAN AMERICAN: 25
GFR NON-AFRICAN AMERICAN: 21
GLUCOSE BLD-MCNC: 267 MG/DL (ref 74–106)
GLUCOSE BLD-MCNC: 288 MG/DL (ref 74–106)
GLUCOSE BLD-MCNC: 309 MG/DL (ref 74–106)
GLUCOSE BLD-MCNC: 322 MG/DL (ref 74–106)
GLUCOSE BLD-MCNC: 367 MG/DL (ref 74–106)
GLUCOSE URINE: NEGATIVE MG/DL
HCT VFR BLD CALC: 34.1 % (ref 37–47)
HEMOGLOBIN: 10.6 G/DL (ref 11.5–16.5)
IMMATURE GRANULOCYTES #: 0 K/UL
IMMATURE GRANULOCYTES %: 0.1 % (ref 0–5)
KETONES, URINE: NEGATIVE MG/DL
LEUKOCYTE ESTERASE, URINE: ABNORMAL
LYMPHOCYTES ABSOLUTE: 1.8 K/UL (ref 1.5–4)
LYMPHOCYTES RELATIVE PERCENT: 25.7 %
MCH RBC QN AUTO: 25.3 PG (ref 27–32)
MCHC RBC AUTO-ENTMCNC: 31.1 G/DL (ref 31–35)
MCV RBC AUTO: 81.4 FL (ref 80–100)
MICROSCOPIC EXAMINATION: YES
MONOCYTES ABSOLUTE: 0.5 K/UL (ref 0.2–0.8)
MONOCYTES RELATIVE PERCENT: 7.1 %
MUCUS: ABNORMAL /LPF
NEUTROPHILS ABSOLUTE: 4.2 K/UL (ref 2–7.5)
NEUTROPHILS RELATIVE PERCENT: 61.8 %
NITRITE, URINE: NEGATIVE
PDW BLD-RTO: 18.4 % (ref 11–16)
PERFORMED ON: ABNORMAL
PH UA: 5 (ref 5–8)
PLATELET # BLD: 235 K/UL (ref 150–400)
PMV BLD AUTO: 10.4 FL (ref 6–10)
POTASSIUM REFLEX MAGNESIUM: 4.2 MMOL/L (ref 3.4–5.1)
PROTEIN UA: 100 MG/DL
RBC # BLD: 4.19 M/UL (ref 3.8–5.8)
RBC UA: ABNORMAL /HPF (ref 0–2)
SODIUM BLD-SCNC: 136 MMOL/L (ref 136–145)
SODIUM URINE: 28 MMOL/L (ref 40–220)
SPECIFIC GRAVITY UA: 1.01 (ref 1–1.03)
URINE REFLEX TO CULTURE: YES
URINE TYPE: ABNORMAL
UROBILINOGEN, URINE: 0.2 E.U./DL
WBC # BLD: 6.9 K/UL (ref 4–11)
WBC UA: >100 /HPF (ref 0–5)

## 2019-04-17 PROCEDURE — 6370000000 HC RX 637 (ALT 250 FOR IP): Performed by: INTERNAL MEDICINE

## 2019-04-17 PROCEDURE — 99222 1ST HOSP IP/OBS MODERATE 55: CPT | Performed by: INTERNAL MEDICINE

## 2019-04-17 PROCEDURE — 6360000002 HC RX W HCPCS: Performed by: NURSE PRACTITIONER

## 2019-04-17 PROCEDURE — 1200000000 HC SEMI PRIVATE

## 2019-04-17 PROCEDURE — 85025 COMPLETE CBC W/AUTO DIFF WBC: CPT

## 2019-04-17 PROCEDURE — 87086 URINE CULTURE/COLONY COUNT: CPT

## 2019-04-17 PROCEDURE — 84300 ASSAY OF URINE SODIUM: CPT

## 2019-04-17 PROCEDURE — 87077 CULTURE AEROBIC IDENTIFY: CPT

## 2019-04-17 PROCEDURE — 36415 COLL VENOUS BLD VENIPUNCTURE: CPT

## 2019-04-17 PROCEDURE — 93975 VASCULAR STUDY: CPT

## 2019-04-17 PROCEDURE — 2580000003 HC RX 258: Performed by: NURSE PRACTITIONER

## 2019-04-17 PROCEDURE — 80048 BASIC METABOLIC PNL TOTAL CA: CPT

## 2019-04-17 PROCEDURE — 81001 URINALYSIS AUTO W/SCOPE: CPT

## 2019-04-17 PROCEDURE — 76770 US EXAM ABDO BACK WALL COMP: CPT

## 2019-04-17 PROCEDURE — 2580000003 HC RX 258: Performed by: INTERNAL MEDICINE

## 2019-04-17 PROCEDURE — 87186 SC STD MICRODIL/AGAR DIL: CPT

## 2019-04-17 RX ORDER — SODIUM CHLORIDE 9 MG/ML
INJECTION, SOLUTION INTRAVENOUS ONCE
Status: COMPLETED | OUTPATIENT
Start: 2019-04-17 | End: 2019-04-17

## 2019-04-17 RX ORDER — 0.9 % SODIUM CHLORIDE 0.9 %
500 INTRAVENOUS SOLUTION INTRAVENOUS ONCE
Status: COMPLETED | OUTPATIENT
Start: 2019-04-17 | End: 2019-04-17

## 2019-04-17 RX ORDER — SODIUM CHLORIDE 9 MG/ML
INJECTION, SOLUTION INTRAVENOUS CONTINUOUS
Status: ACTIVE | OUTPATIENT
Start: 2019-04-17 | End: 2019-04-18

## 2019-04-17 RX ADMIN — METOPROLOL TARTRATE 25 MG: 25 TABLET ORAL at 08:34

## 2019-04-17 RX ADMIN — SIMVASTATIN 20 MG: 20 TABLET, FILM COATED ORAL at 20:32

## 2019-04-17 RX ADMIN — GABAPENTIN 300 MG: 300 CAPSULE ORAL at 20:32

## 2019-04-17 RX ADMIN — LEVOTHYROXINE SODIUM 150 MCG: 75 TABLET ORAL at 06:08

## 2019-04-17 RX ADMIN — ISOSORBIDE DINITRATE 15 MG: 10 TABLET ORAL at 08:34

## 2019-04-17 RX ADMIN — INSULIN LISPRO 2 UNITS: 100 INJECTION, SOLUTION INTRAVENOUS; SUBCUTANEOUS at 20:32

## 2019-04-17 RX ADMIN — FERROUS SULFATE TAB EC 324 MG (65 MG FE EQUIVALENT) 324 MG: 324 (65 FE) TABLET DELAYED RESPONSE at 08:34

## 2019-04-17 RX ADMIN — PANTOPRAZOLE SODIUM 40 MG: 40 TABLET, DELAYED RELEASE ORAL at 08:34

## 2019-04-17 RX ADMIN — HYDRALAZINE HYDROCHLORIDE 10 MG: 10 TABLET, FILM COATED ORAL at 08:34

## 2019-04-17 RX ADMIN — DOCUSATE SODIUM 100 MG: 100 CAPSULE, LIQUID FILLED ORAL at 08:34

## 2019-04-17 RX ADMIN — OXYCODONE HYDROCHLORIDE AND ACETAMINOPHEN 500 MG: 500 TABLET ORAL at 08:34

## 2019-04-17 RX ADMIN — CEFTRIAXONE 1 G: 1 INJECTION, POWDER, FOR SOLUTION INTRAMUSCULAR; INTRAVENOUS at 17:02

## 2019-04-17 RX ADMIN — ZINC SULFATE 220 MG (50 MG) CAPSULE 220 MG: CAPSULE at 20:32

## 2019-04-17 RX ADMIN — SODIUM CHLORIDE: 9 INJECTION, SOLUTION INTRAVENOUS at 15:59

## 2019-04-17 RX ADMIN — GABAPENTIN 300 MG: 300 CAPSULE ORAL at 08:34

## 2019-04-17 RX ADMIN — MULTIPLE VITAMINS W/ MINERALS TAB 1 TABLET: TAB at 08:34

## 2019-04-17 RX ADMIN — HYDRALAZINE HYDROCHLORIDE 10 MG: 10 TABLET, FILM COATED ORAL at 20:31

## 2019-04-17 RX ADMIN — INSULIN LISPRO 5 UNITS: 100 INJECTION, SOLUTION INTRAVENOUS; SUBCUTANEOUS at 17:01

## 2019-04-17 RX ADMIN — ASPIRIN 81 MG: 81 TABLET, COATED ORAL at 08:34

## 2019-04-17 RX ADMIN — INSULIN LISPRO 4 UNITS: 100 INJECTION, SOLUTION INTRAVENOUS; SUBCUTANEOUS at 08:44

## 2019-04-17 RX ADMIN — DOCUSATE SODIUM 100 MG: 100 CAPSULE, LIQUID FILLED ORAL at 20:31

## 2019-04-17 RX ADMIN — FERROUS SULFATE TAB EC 324 MG (65 MG FE EQUIVALENT) 324 MG: 324 (65 FE) TABLET DELAYED RESPONSE at 20:31

## 2019-04-17 RX ADMIN — ZINC SULFATE 220 MG (50 MG) CAPSULE 220 MG: CAPSULE at 08:34

## 2019-04-17 RX ADMIN — Medication 10 ML: at 08:36

## 2019-04-17 RX ADMIN — SODIUM CHLORIDE: 9 INJECTION, SOLUTION INTRAVENOUS at 08:37

## 2019-04-17 RX ADMIN — SODIUM CHLORIDE 500 ML: 9 INJECTION, SOLUTION INTRAVENOUS at 14:16

## 2019-04-17 RX ADMIN — METOPROLOL TARTRATE 25 MG: 25 TABLET ORAL at 20:31

## 2019-04-17 RX ADMIN — POLYETHYLENE GLYCOL 3350 17 G: 17 POWDER, FOR SOLUTION ORAL at 08:35

## 2019-04-17 NOTE — PROGRESS NOTES
Pt arrived to floor from ED via stretcher and placed in bed 2-2. Emigdio Bonner Vitals taken and recorded. Head to toe assessment done. Appropriate meds administered. HOB elevated. Call light with in reach. Will continue to monitor.

## 2019-04-17 NOTE — H&P
History and Physical    Patient:  Brad Kirk    CHIEF COMPLAINT:    Abnormal lab    HISTORY OF PRESENT ILLNESS:   The patient is a 59 y.o. female with a past medical history significant for hypertension, diabetes and morbid obesity who is a nursing home resident who presents with abnormal lab. Patient was found to have elevated BUN and creatinine per routine follow-up blood work at the nursing facility. She was sent to ED for further evaluation and management. Patient denies any nausea, vomiting or diarrhea. Reports tolerating oral intake. Denies acute pain. No mention of trying anything for her acute renal failure at nursing facility. Past Medical History:      Diagnosis Date    Acute metabolic encephalopathy     Acute renal failure (ARF) (HCC)     Anemia     Chronic venous insufficiency     GERD (gastroesophageal reflux disease) 12/2013    Severe Reflux Esophagitis and hemorrhagic gastritis    Headache(784.0)     Hypertension     Hypothyroidism     Thrombocytopenia (HCC)     Type II or unspecified type diabetes mellitus without mention of complication, not stated as uncontrolled     Vitamin B12 deficiency        Past Surgical History:      Procedure Laterality Date    EYE SURGERY      LEG SURGERY      RI COLONOSCOPY FLX DX W/COLLJ SPEC WHEN PFRMD N/A 10/5/2018    COLONOSCOPY DIAGNOSTIC OR SCREENING performed by Joann Vieira MD at [address] [place] [address] EGD TRANSORAL BIOPSY SINGLE/MULTIPLE N/A 10/5/2018    EGD BIOPSY performed by Joann Vieira MD at [facility] ENDOSCOPY       Medications Prior to Admission:    Prior to Admission medications    Medication Sig Start Date End Date Taking? Authorizing Provider   lidocaine (LIDODERM) 5 % Place 1 patch onto the skin daily Apply to left knee. 12 hours on, 12 hours off.    Yes Historical Provider, MD   Ferrous Sulfate (IRON) 325 (65 Fe) MG TABS take 1 tablet by mouth twice a day with food 4/4/19  Yes JOSE Strickland   gabapentin (NEURONTIN) 300 MG capsule Take 1 capsule by mouth 2 times daily for 180 days. Intended supply: 30 days 3/14/19 9/10/19 Yes Danette Horn MD   isosorbide dinitrate (ISORDIL) 10 MG tablet Take 15 mg by mouth daily   Yes Historical Provider, MD   pantoprazole (PROTONIX) 40 MG tablet Take 40 mg by mouth daily   Yes Historical Provider, MD   docusate sodium (COLACE) 100 MG capsule Take 100 mg by mouth 2 times daily   Yes Historical Provider, MD   saccharomyces boulardii (FLORASTOR) 250 MG capsule Take 250 mg by mouth 2 times daily   Yes Historical Provider, MD   polyethylene glycol (MIRALAX) powder Take 17 g by mouth 2 times daily   Yes Historical Provider, MD   bisoprolol (ZEBETA) 10 MG tablet Take 10 mg by mouth 2 times daily   Yes Historical Provider, MD   zinc sulfate (ZINCATE) 220 (50 Zn) MG capsule Take 220 mg by mouth 2 times daily   Yes Historical Provider, MD   bumetanide (BUMEX) 2 MG tablet Take 2 mg by mouth 2 times daily   Yes Historical Provider, MD   arginine 500 MG tablet Take 500 mg by mouth daily   Yes Historical Provider, MD   insulin aspart (NOVOLOG) 100 UNIT/ML injection vial Inject into the skin 4 times daily (after meals and at bedtime) Per sliding scale protocol   Yes Historical Provider, MD   ammonium lactate (AMLACTIN) 12 % cream Apply topically 2 times daily as needed for Dry Skin Apply topically as needed.    Yes Historical Provider, MD   acetaminophen (TYLENOL) 325 MG tablet Take 650 mg by mouth every 6 hours as needed for Pain   Yes Historical Provider, MD   aspirin 81 MG EC tablet Take 81 mg by mouth daily   Yes Historical Provider, MD   hydrALAZINE (APRESOLINE) 25 MG tablet Take 10 mg by mouth 2 times daily    Yes Historical Provider, MD   Multiple Vitamins-Minerals (THERAPEUTIC MULTIVITAMIN-MINERALS) tablet Take 1 tablet by mouth daily   Yes Historical Provider, MD   simvastatin (ZOCOR) 20 MG tablet take 1 tablet by mouth at bedtime 11/27/18  Yes JOSE Fan RA VITAMIN C 500 MG tablet TAKE 1 No respiratory distress, no wheezing, rales or rhonchi detected  Cardiovascular:  Normal rate, normal rhythm, no murmurs, no gallops, no rubs, no edema   GI:  Soft, obese with large pannus, normal bowel sounds, nontender, no voluntary guarding  Musculoskeletal:  No cyanosis or obvious acute deformity. Moving all extremities   Integument:  Warm and dry. Chronic skin changes noted to bilateral lower extremities   Neurologic:  Alert & oriented x 3, no apparent focal deficits noted   Psychiatric:  Speech and behavior appropriate         Lab Results   Component Value Date     04/17/2019    K 4.2 04/17/2019    CL 95 (L) 04/17/2019    CO2 27 04/17/2019     (HH) 04/17/2019    CREATININE 2.4 (H) 04/17/2019    GLUCOSE 288 (H) 04/17/2019    CALCIUM 9.9 04/17/2019    PROT 8.6 (H) 04/16/2019    LABALBU 4.2 04/16/2019    BILITOT 0.3 04/16/2019    ALKPHOS 131 (H) 04/16/2019    AST 14 04/16/2019    ALT 12 04/16/2019    LABGLOM 21 (L) 04/17/2019    GFRAA 25 (L) 04/17/2019    AGRATIO 1.0 04/16/2019    GLOB 4.4 04/16/2019           Lab Results   Component Value Date    WBC 6.9 04/17/2019    HGB 10.6 (L) 04/17/2019    HCT 34.1 (L) 04/17/2019    MCV 81.4 04/17/2019     04/17/2019       US RETROPERITONEAL COMPLETE   Final Result   Impression: Unremarkable retroperitoneal ultrasound. Normal bilateral renal resistive indices. US DUP ABD PEL RETRO SCROT COMPLETE   Final Result   Impression: Unremarkable retroperitoneal ultrasound. Normal bilateral renal resistive indices. Assessment and Plan     Active Hospital Problems    Diagnosis Date Noted    Acute cystitis [N30.00]  Treat with IV Rocephin pending urine culture. 04/17/2019    ARF (acute renal failure) (HCC) [N17.9]  Noted to be in significant acute renal failure with creatinine 2.4 and . Seems to be prerenal etiology. Proceed with IV fluid bolus followed by maintenance IV hydration. Avoid nephrotoxic agents as able.  Renal ultrasound/duplex unremarkable. Check BMP in a.m.   04/16/2019    Morbid obesity ([address] [Y56.20]  Complicates all aspects of care. Consult dietitian for evaluation. 02/05/2019    Anemia [D64.9]  Monitor hemoglobin. GI prophylaxis. CBC in a.m. GI regimen. 10/02/2018    Type 2 diabetes mellitus with complication (HCC) [P95.9]  Stable. Cover with sliding scale insulin per protocol if indicated. Hypoglycemia protocol on board. Monitor fingersticks. Patient was seen and examined by Dr. Henrry Kirkland and plan of care reviewed.       Vi Maldonado certifies per [employer] regulation for 42 CFR [phone]), that the patient may reasonably be expected to be discharged or transferred to a hospital within 96 hours after admission to [address] [place] [address]    Electronically signed by JOSE Ang on 4/17/2019 at 4:47 PM

## 2019-04-17 NOTE — PLAN OF CARE
Problem: Falls - Risk of:  Goal: Will remain free from falls  Description  Will remain free from falls  4/17/2019 1104 by Audrey Hernandez RN  Outcome: Ongoing  4/16/2019 2249 by Shailesh Camarillo RN  Outcome: Ongoing     Problem: Risk for Impaired Skin Integrity  Goal: Tissue integrity - skin and mucous membranes  Description  Structural intactness and normal physiological function of skin and  mucous membranes.   Outcome: Ongoing     Problem: Infection:  Goal: Will remain free from infection  Description  Will remain free from infection  Outcome: Ongoing     Problem: Safety:  Goal: Free from accidental physical injury  Description  Free from accidental physical injury  4/17/2019 1104 by Audrey Hernandez RN  Outcome: Ongoing  4/16/2019 2249 by Shailesh Camarillo RN  Outcome: Ongoing

## 2019-04-17 NOTE — ED NOTES
Pt to be admitted to 2-2 on medical unit. RN not available for report att. They will call back.       Sammie Chavez RN  04/16/19 8202

## 2019-04-18 VITALS
TEMPERATURE: 97.6 F | WEIGHT: 255.25 LBS | HEART RATE: 112 BPM | DIASTOLIC BLOOD PRESSURE: 64 MMHG | OXYGEN SATURATION: 95 % | HEIGHT: 69 IN | BODY MASS INDEX: 37.81 KG/M2 | SYSTOLIC BLOOD PRESSURE: 106 MMHG | RESPIRATION RATE: 18 BRPM

## 2019-04-18 LAB
ANION GAP SERPL CALCULATED.3IONS-SCNC: 15 MMOL/L (ref 3–16)
BUN BLDV-MCNC: 123 MG/DL (ref 6–20)
CALCIUM SERPL-MCNC: 9.5 MG/DL (ref 8.5–10.5)
CHLORIDE BLD-SCNC: 98 MMOL/L (ref 98–107)
CO2: 23 MMOL/L (ref 20–30)
CREAT SERPL-MCNC: 2.2 MG/DL (ref 0.4–1.2)
GFR AFRICAN AMERICAN: 27
GFR NON-AFRICAN AMERICAN: 23
GLUCOSE BLD-MCNC: 237 MG/DL (ref 74–106)
GLUCOSE BLD-MCNC: 282 MG/DL (ref 74–106)
GLUCOSE BLD-MCNC: 291 MG/DL (ref 74–106)
GLUCOSE BLD-MCNC: 312 MG/DL (ref 74–106)
HCT VFR BLD CALC: 32.4 % (ref 37–47)
HEMOGLOBIN: 10 G/DL (ref 11.5–16.5)
MCH RBC QN AUTO: 25.2 PG (ref 27–32)
MCHC RBC AUTO-ENTMCNC: 30.9 G/DL (ref 31–35)
MCV RBC AUTO: 81.6 FL (ref 80–100)
PDW BLD-RTO: 18.3 % (ref 11–16)
PERFORMED ON: ABNORMAL
PLATELET # BLD: 197 K/UL (ref 150–400)
PMV BLD AUTO: 10.4 FL (ref 6–10)
POTASSIUM SERPL-SCNC: 4.1 MMOL/L (ref 3.4–5.1)
RBC # BLD: 3.97 M/UL (ref 3.8–5.8)
SODIUM BLD-SCNC: 136 MMOL/L (ref 136–145)
WBC # BLD: 6.2 K/UL (ref 4–11)

## 2019-04-18 PROCEDURE — 6370000000 HC RX 637 (ALT 250 FOR IP): Performed by: NURSE PRACTITIONER

## 2019-04-18 PROCEDURE — 6370000000 HC RX 637 (ALT 250 FOR IP): Performed by: INTERNAL MEDICINE

## 2019-04-18 PROCEDURE — 80048 BASIC METABOLIC PNL TOTAL CA: CPT

## 2019-04-18 PROCEDURE — 6360000002 HC RX W HCPCS: Performed by: NURSE PRACTITIONER

## 2019-04-18 PROCEDURE — 2580000003 HC RX 258: Performed by: NURSE PRACTITIONER

## 2019-04-18 PROCEDURE — 36415 COLL VENOUS BLD VENIPUNCTURE: CPT

## 2019-04-18 PROCEDURE — 2580000003 HC RX 258: Performed by: INTERNAL MEDICINE

## 2019-04-18 PROCEDURE — 85027 COMPLETE CBC AUTOMATED: CPT

## 2019-04-18 PROCEDURE — 93005 ELECTROCARDIOGRAM TRACING: CPT

## 2019-04-18 PROCEDURE — 99238 HOSP IP/OBS DSCHRG MGMT 30/<: CPT | Performed by: INTERNAL MEDICINE

## 2019-04-18 RX ORDER — HYDRALAZINE HYDROCHLORIDE 10 MG/1
10 TABLET, FILM COATED ORAL 2 TIMES DAILY
Status: ON HOLD | COMMUNITY
End: 2019-05-31 | Stop reason: SDUPTHER

## 2019-04-18 RX ORDER — ASPIRIN 81 MG/1
81 TABLET, CHEWABLE ORAL DAILY
Status: DISCONTINUED | OUTPATIENT
Start: 2019-04-19 | End: 2019-04-18 | Stop reason: HOSPADM

## 2019-04-18 RX ORDER — METOPROLOL TARTRATE 50 MG/1
50 TABLET, FILM COATED ORAL 2 TIMES DAILY
Status: DISCONTINUED | OUTPATIENT
Start: 2019-04-18 | End: 2019-04-18 | Stop reason: HOSPADM

## 2019-04-18 RX ORDER — ASPIRIN 81 MG/1
81 TABLET, CHEWABLE ORAL DAILY
COMMUNITY

## 2019-04-18 RX ORDER — LEVOTHYROXINE SODIUM 0.05 MG/1
150 TABLET ORAL DAILY
Qty: 30 TABLET | Refills: 0 | Status: CANCELLED
Start: 2019-04-18

## 2019-04-18 RX ORDER — CEFDINIR 300 MG/1
300 CAPSULE ORAL 2 TIMES DAILY
Qty: 10 CAPSULE | Refills: 0 | Status: ON HOLD | OUTPATIENT
Start: 2019-04-18 | End: 2019-05-31 | Stop reason: HOSPADM

## 2019-04-18 RX ORDER — ZINC OXIDE AND DIMETHICONE 120; 10 MG/G; MG/G
1 CREAM TOPICAL PRN
COMMUNITY

## 2019-04-18 RX ORDER — HONEY 100 %
1 PASTE (ML) TOPICAL PRN
Status: ON HOLD | COMMUNITY
End: 2019-06-06 | Stop reason: HOSPADM

## 2019-04-18 RX ORDER — ISOSORBIDE MONONITRATE 10 MG/1
10 TABLET ORAL DAILY
COMMUNITY
End: 2020-01-29 | Stop reason: SDUPTHER

## 2019-04-18 RX ORDER — BISOPROLOL FUMARATE 10 MG/1
10 TABLET ORAL 2 TIMES DAILY
Qty: 60 TABLET | Refills: 0 | Status: SHIPPED
Start: 2019-04-18 | End: 2020-01-29 | Stop reason: ALTCHOICE

## 2019-04-18 RX ORDER — ISOSORBIDE MONONITRATE 30 MG/1
15 TABLET, EXTENDED RELEASE ORAL DAILY
Status: DISCONTINUED | OUTPATIENT
Start: 2019-04-19 | End: 2019-04-18 | Stop reason: HOSPADM

## 2019-04-18 RX ORDER — LIDOCAINE 4 G/G
1 PATCH TOPICAL DAILY
Status: DISCONTINUED | OUTPATIENT
Start: 2019-04-18 | End: 2019-04-18 | Stop reason: HOSPADM

## 2019-04-18 RX ORDER — LEVOTHYROXINE SODIUM 0.1 MG/1
100 TABLET ORAL DAILY
Status: ON HOLD | COMMUNITY
End: 2019-06-06 | Stop reason: HOSPADM

## 2019-04-18 RX ORDER — GABAPENTIN 300 MG/1
300 CAPSULE ORAL 2 TIMES DAILY
Qty: 4 CAPSULE | Refills: 0 | Status: SHIPPED | OUTPATIENT
Start: 2019-04-18 | End: 2019-05-28 | Stop reason: SDUPTHER

## 2019-04-18 RX ORDER — ZINC OXIDE AND DIMETHICONE 120; 10 MG/G; MG/G
CREAM TOPICAL 2 TIMES DAILY PRN
Status: DISCONTINUED | OUTPATIENT
Start: 2019-04-18 | End: 2019-04-18 | Stop reason: HOSPADM

## 2019-04-18 RX ADMIN — METOPROLOL TARTRATE 25 MG: 25 TABLET ORAL at 11:20

## 2019-04-18 RX ADMIN — ISOSORBIDE DINITRATE 15 MG: 10 TABLET ORAL at 08:13

## 2019-04-18 RX ADMIN — METOPROLOL TARTRATE 25 MG: 25 TABLET ORAL at 08:13

## 2019-04-18 RX ADMIN — ASPIRIN 81 MG: 81 TABLET, COATED ORAL at 08:13

## 2019-04-18 RX ADMIN — INSULIN LISPRO 2 UNITS: 100 INJECTION, SOLUTION INTRAVENOUS; SUBCUTANEOUS at 17:17

## 2019-04-18 RX ADMIN — LEVOTHYROXINE SODIUM 150 MCG: 75 TABLET ORAL at 06:17

## 2019-04-18 RX ADMIN — GABAPENTIN 300 MG: 300 CAPSULE ORAL at 08:12

## 2019-04-18 RX ADMIN — Medication 10 ML: at 08:20

## 2019-04-18 RX ADMIN — ZINC SULFATE 220 MG (50 MG) CAPSULE 220 MG: CAPSULE at 08:13

## 2019-04-18 RX ADMIN — HYDRALAZINE HYDROCHLORIDE 10 MG: 10 TABLET, FILM COATED ORAL at 08:13

## 2019-04-18 RX ADMIN — MULTIPLE VITAMINS W/ MINERALS TAB 1 TABLET: TAB at 08:13

## 2019-04-18 RX ADMIN — INSULIN LISPRO 3 UNITS: 100 INJECTION, SOLUTION INTRAVENOUS; SUBCUTANEOUS at 08:16

## 2019-04-18 RX ADMIN — OXYCODONE HYDROCHLORIDE AND ACETAMINOPHEN 500 MG: 500 TABLET ORAL at 08:13

## 2019-04-18 RX ADMIN — CEFTRIAXONE 1 G: 1 INJECTION, POWDER, FOR SOLUTION INTRAMUSCULAR; INTRAVENOUS at 17:10

## 2019-04-18 RX ADMIN — PANTOPRAZOLE SODIUM 40 MG: 40 TABLET, DELAYED RELEASE ORAL at 08:13

## 2019-04-18 RX ADMIN — DOCUSATE SODIUM 100 MG: 100 CAPSULE, LIQUID FILLED ORAL at 08:13

## 2019-04-18 RX ADMIN — FERROUS SULFATE TAB EC 324 MG (65 MG FE EQUIVALENT) 324 MG: 324 (65 FE) TABLET DELAYED RESPONSE at 08:13

## 2019-04-18 RX ADMIN — INSULIN LISPRO 4 UNITS: 100 INJECTION, SOLUTION INTRAVENOUS; SUBCUTANEOUS at 11:22

## 2019-04-18 NOTE — PLAN OF CARE
Problem: Falls - Risk of:  Goal: Will remain free from falls  Description  Will remain free from falls  Outcome: Ongoing     Problem: Risk for Impaired Skin Integrity  Goal: Tissue integrity - skin and mucous membranes  Description  Structural intactness and normal physiological function of skin and  mucous membranes.   4/18/2019 0910 by Bethany Cross RN  Outcome: Ongoing  4/18/2019 0107 by Chanel Mays RN  Outcome: Ongoing  4/18/2019 0106 by Chanel Mays RN  Outcome: Ongoing     Problem: Safety:  Goal: Free from accidental physical injury  Description  Free from accidental physical injury  Outcome: Ongoing     Problem: Daily Care:  Goal: Daily care needs are met  Description  Daily care needs are met  4/18/2019 0107 by Chanel Mays RN  Outcome: Ongoing  4/18/2019 0106 by Chanel Mays RN  Outcome: Ongoing     Problem: Skin Integrity:  Goal: Skin integrity will stabilize  Description  Skin integrity will stabilize  Outcome: Ongoing

## 2019-04-18 NOTE — DISCHARGE SUMMARY
Discharge Summary      Patient ID: Tanja Villalba      Patient's PCP: JOSE Shaw    Admit Date: 4/16/2019     Discharge Date:  4/18/2019    Admitting Provider: Halima Del Castillo MD    Discharging Provider: JOSE Hernandez     Reason for this admission:   Abnormal labs    Discharge Diagnoses: Active Hospital Problems    Diagnosis Date Noted    Acute cystitis [N30.00] 04/17/2019    ARF (acute renal failure) ([facility] [N17.9] 04/16/2019    Morbid obesity ([facility] [E66.01] 02/05/2019    Anemia [D64.9] 10/02/2018    Type 2 diabetes mellitus with complication (HCC) [Q82.7]        Procedures:  US RETROPERITONEAL COMPLETE   Final Result   Impression: Unremarkable retroperitoneal ultrasound. Normal bilateral renal resistive indices. US DUP ABD PEL RETRO SCROT COMPLETE   Final Result   Impression: Unremarkable retroperitoneal ultrasound. Normal bilateral renal resistive indices. Consults:   IP CONSULT TO DIETITIAN      Briefly:   The patient is a 59 y.o. female with a past medical history significant for hypertension, diabetes and morbid obesity who is a nursing home resident who presents with abnormal lab. Patient was found to have elevated BUN and creatinine per routine follow-up blood work at the nursing facility. She was sent to ED for further evaluation and management. Hospital Course: Active Hospital Problems    Diagnosis Date Noted    Acute cystitis [N30.00]  Treated with antibiotics pending urine culture. Urine culture currently shows gram-negative ivette. ID and sensitivity data pending. Patient will be discharged back to nursing facility on [facility] pending urine culture. 04/17/2019    ARF (acute renal failure) (HCC) [N17.9]  Noted to be in significant acute renal failure felt to be prerenal. Urine sodium decreased consistent with this. Diuretic held. Patient treated with IV hydration. Nephrotoxic agents avoided as able. Renal duplex and Doppler unremarkable. behavior appropriate           Activity: activity with assistance as tolerated  Diet: diabetic diet  Follow Up: Primary Care Physician in 1 week. Patient to follow up with her nephrologist in 1-2 weeks (nursing facility to arrange)  Patient to proceed with the following lab CBC and BMP on April 20, 2019    Labs: For convenience and continuity at follow-up the following most recent labs are provided:    CBC:   Lab Results   Component Value Date    WBC 6.2 04/18/2019    HGB 10.0 04/18/2019    HCT 32.4 04/18/2019     04/18/2019       RENAL:   Lab Results   Component Value Date     04/18/2019    K 4.1 04/18/2019    K 4.2 04/17/2019    CL 98 04/18/2019    CO2 23 04/18/2019     04/18/2019    CREATININE 2.2 04/18/2019         Discharge Medications:     Current Discharge Medication List           Details   cefdinir (OMNICEF) 300 MG capsule Take 1 capsule by mouth 2 times daily  Qty: 10 capsule, Refills: 0     Normal saline 0.9% at 100 mL/hr for 2 liters per IV. D/C IV after infusion complete. Details   bisoprolol (ZEBETA) 10 MG tablet Take 1 tablet by mouth 2 times daily  Qty: 60 tablet, Refills: 0      gabapentin (NEURONTIN) 300 MG capsule Take 1 capsule by mouth 2 times daily for 2 days. Qty: 4 capsule, Refills: 0    Associated Diagnoses: Diabetic polyneuropathy associated with type 2 diabetes mellitus ([facility]              Details   aspirin 81 MG chewable tablet Take 81 mg by mouth daily      isosorbide mononitrate (ISMO;MONOKET) 10 MG tablet Take 15 mg by mouth daily      hydrALAZINE (APRESOLINE) 10 MG tablet Take 10 mg by mouth 2 times daily      !! levothyroxine (SYNTHROID) 100 MCG tablet Take 100 mcg by mouth Daily Patient is taking total of 150 mcg per day. Wound Dressings ([facility] WOUND/BURN DRESSING) PSTE paste Apply 1 g topically as needed Apply daily and prn to right heel and cover with dry dressing until healed. Skin Protectants, Misc.  (DIMETHICONE-ZINC OXIDE) cream Refills: 0       !! - Potential duplicate medications found. Please discuss with provider. Patient was seen and examined by Dr. Loc Byrne and plan of care reviewed. Signed:  Electronically signed by JOSE Puri on 4/18/2019 at 11:46 AM       Thank you JOSE Lacey for the opportunity to be involved in this patient's care. If you have any questions or concerns please feel free to contact me at [phone].

## 2019-04-18 NOTE — PROGRESS NOTES
Pt discharged at this time. Pt IV left in place for continuing IV therapy at nursing home. Pt transported via EMS. Left floor per stretcher.

## 2019-04-18 NOTE — PLAN OF CARE
Problem: Risk for Impaired Skin Integrity  Goal: Tissue integrity - skin and mucous membranes  Description  Structural intactness and normal physiological function of skin and  mucous membranes.   4/18/2019 0107 by Niurka Escalante RN  Outcome: Ongoing  4/18/2019 0106 by Niurka Escalante RN  Outcome: Ongoing     Problem: Daily Care:  Goal: Daily care needs are met  Description  Daily care needs are met  4/18/2019 0107 by Niurka Escalante RN  Outcome: Ongoing  4/18/2019 0106 by Niurka Escalante RN  Outcome: Ongoing

## 2019-04-19 LAB
ORGANISM: ABNORMAL
URINE CULTURE, ROUTINE: ABNORMAL
URINE CULTURE, ROUTINE: ABNORMAL

## 2019-04-23 ENCOUNTER — HOSPITAL ENCOUNTER (OUTPATIENT)
Facility: HOSPITAL | Age: 59
Discharge: HOME | End: 2019-04-23
Payer: MEDICARE

## 2019-04-23 LAB
ANION GAP SERPL CALCULATED.3IONS-SCNC: 13 MMOL/L (ref 3–16)
BASOPHILS ABSOLUTE: 0 K/UL (ref 0–0.1)
BASOPHILS RELATIVE PERCENT: 0.3 %
BUN BLDV-MCNC: 109 MG/DL (ref 6–20)
CALCIUM SERPL-MCNC: 9.8 MG/DL (ref 8.5–10.5)
CHLORIDE BLD-SCNC: 100 MMOL/L (ref 98–107)
CO2: 23 MMOL/L (ref 20–30)
CREAT SERPL-MCNC: 2.1 MG/DL (ref 0.4–1.2)
EOSINOPHILS ABSOLUTE: 0.3 K/UL (ref 0–0.4)
EOSINOPHILS RELATIVE PERCENT: 5.3 %
GFR AFRICAN AMERICAN: 29
GFR NON-AFRICAN AMERICAN: 24
GLUCOSE BLD-MCNC: 320 MG/DL (ref 74–106)
HCT VFR BLD CALC: 33 % (ref 37–47)
HEMOGLOBIN: 9.8 G/DL (ref 11.5–16.5)
IMMATURE GRANULOCYTES #: 0 K/UL
IMMATURE GRANULOCYTES %: 0.3 % (ref 0–5)
LYMPHOCYTES ABSOLUTE: 1.3 K/UL (ref 1.5–4)
LYMPHOCYTES RELATIVE PERCENT: 21.9 %
MCH RBC QN AUTO: 24.9 PG (ref 27–32)
MCHC RBC AUTO-ENTMCNC: 29.7 G/DL (ref 31–35)
MCV RBC AUTO: 84 FL (ref 80–100)
MONOCYTES ABSOLUTE: 0.4 K/UL (ref 0.2–0.8)
MONOCYTES RELATIVE PERCENT: 6.3 %
NEUTROPHILS ABSOLUTE: 3.9 K/UL (ref 2–7.5)
NEUTROPHILS RELATIVE PERCENT: 65.9 %
PDW BLD-RTO: 18.1 % (ref 11–16)
PLATELET # BLD: 191 K/UL (ref 150–400)
PMV BLD AUTO: 11.1 FL (ref 6–10)
POTASSIUM SERPL-SCNC: 4.8 MMOL/L (ref 3.4–5.1)
RBC # BLD: 3.93 M/UL (ref 3.8–5.8)
SODIUM BLD-SCNC: 136 MMOL/L (ref 136–145)
WBC # BLD: 5.9 K/UL (ref 4–11)

## 2019-04-23 PROCEDURE — 80048 BASIC METABOLIC PNL TOTAL CA: CPT

## 2019-04-23 PROCEDURE — 85025 COMPLETE CBC W/AUTO DIFF WBC: CPT

## 2019-04-26 ENCOUNTER — OFFICE VISIT (OUTPATIENT)
Dept: PRIMARY CARE CLINIC | Age: 59
End: 2019-04-26
Payer: MEDICARE

## 2019-04-26 DIAGNOSIS — E11.8 TYPE 2 DIABETES MELLITUS WITH COMPLICATION, WITH LONG-TERM CURRENT USE OF INSULIN (HCC): ICD-10-CM

## 2019-04-26 DIAGNOSIS — E03.9 HYPOTHYROIDISM, UNSPECIFIED TYPE: ICD-10-CM

## 2019-04-26 DIAGNOSIS — G89.29 CHRONIC PAIN OF LEFT KNEE: ICD-10-CM

## 2019-04-26 DIAGNOSIS — D64.9 ANEMIA, UNSPECIFIED TYPE: ICD-10-CM

## 2019-04-26 DIAGNOSIS — M25.562 CHRONIC PAIN OF LEFT KNEE: ICD-10-CM

## 2019-04-26 DIAGNOSIS — N17.9 ACUTE RENAL FAILURE SUPERIMPOSED ON STAGE 3 CHRONIC KIDNEY DISEASE, UNSPECIFIED ACUTE RENAL FAILURE TYPE: ICD-10-CM

## 2019-04-26 DIAGNOSIS — Z79.4 TYPE 2 DIABETES MELLITUS WITH COMPLICATION, WITH LONG-TERM CURRENT USE OF INSULIN (HCC): ICD-10-CM

## 2019-04-26 DIAGNOSIS — E66.01 MORBID OBESITY (HCC): ICD-10-CM

## 2019-04-26 DIAGNOSIS — N18.3 ACUTE RENAL FAILURE SUPERIMPOSED ON STAGE 3 CHRONIC KIDNEY DISEASE, UNSPECIFIED ACUTE RENAL FAILURE TYPE: ICD-10-CM

## 2019-04-26 DIAGNOSIS — R53.81 DECLINING FUNCTIONAL STATUS: Primary | ICD-10-CM

## 2019-04-26 DIAGNOSIS — L89.619 PRESSURE INJURY OF SKIN OF RIGHT HEEL, UNSPECIFIED INJURY STAGE: ICD-10-CM

## 2019-04-26 PROCEDURE — 99309 SBSQ NF CARE MODERATE MDM 30: CPT | Performed by: INTERNAL MEDICINE

## 2019-04-26 PROCEDURE — 3045F PR MOST RECENT HEMOGLOBIN A1C LEVEL 7.0-9.0%: CPT | Performed by: INTERNAL MEDICINE

## 2019-04-29 ENCOUNTER — HOSPITAL ENCOUNTER (OUTPATIENT)
Facility: HOSPITAL | Age: 59
Discharge: HOME | End: 2019-04-29
Payer: MEDICARE

## 2019-04-29 ENCOUNTER — HOSPITAL ENCOUNTER (INPATIENT)
Facility: HOSPITAL | Age: 59
LOS: 1 days | Discharge: NURSING, REHAB OR LONG-TERM CARE | End: 2019-05-03
Attending: HOSPITALIST | Admitting: HOSPITALIST

## 2019-04-29 DIAGNOSIS — Z74.09 IMPAIRED MOBILITY AND ADLS: ICD-10-CM

## 2019-04-29 DIAGNOSIS — Z78.9 IMPAIRED MOBILITY AND ADLS: ICD-10-CM

## 2019-04-29 DIAGNOSIS — Z74.09 IMPAIRED FUNCTIONAL MOBILITY AND ACTIVITY TOLERANCE: Primary | ICD-10-CM

## 2019-04-29 PROBLEM — L89.619 PRESSURE INJURY OF SKIN OF RIGHT HEEL: Status: ACTIVE | Noted: 2019-04-29

## 2019-04-29 PROBLEM — N17.9 ACUTE RENAL FAILURE (ARF): Status: ACTIVE | Noted: 2019-04-29

## 2019-04-29 PROBLEM — N30.00 ACUTE CYSTITIS: Status: RESOLVED | Noted: 2019-04-17 | Resolved: 2019-04-29

## 2019-04-29 PROBLEM — E87.5 HYPERKALEMIA: Status: ACTIVE | Noted: 2019-04-29

## 2019-04-29 LAB
ALBUMIN SERPL-MCNC: 3.8 G/DL (ref 3.4–4.8)
ANION GAP SERPL CALCULATED.3IONS-SCNC: 13 MMOL/L (ref 3–16)
ANION GAP SERPL CALCULATED.3IONS-SCNC: 18.4 MMOL/L (ref 10–20)
BACTERIA UR QL AUTO: ABNORMAL /HPF
BASOPHILS # BLD AUTO: 0.02 10*3/MM3 (ref 0–0.2)
BASOPHILS ABSOLUTE: 0 K/UL (ref 0–0.1)
BASOPHILS NFR BLD AUTO: 0.4 % (ref 0–1.5)
BASOPHILS RELATIVE PERCENT: 0.5 %
BILIRUB UR QL STRIP: NEGATIVE
BUN BLD-MCNC: 99 MG/DL (ref 7–20)
BUN BLDV-MCNC: 102 MG/DL (ref 6–20)
BUN/CREAT SERPL: 52.1 (ref 7.1–23.5)
CALCIUM SERPL-MCNC: 9.8 MG/DL (ref 8.5–10.5)
CALCIUM SPEC-SCNC: 10.1 MG/DL (ref 8.4–10.2)
CHLORIDE BLD-SCNC: 104 MMOL/L (ref 98–107)
CHLORIDE SERPL-SCNC: 106 MMOL/L (ref 98–107)
CLARITY UR: CLEAR
CO2 SERPL-SCNC: 19 MMOL/L (ref 26–30)
CO2: 19 MMOL/L (ref 20–30)
COLOR UR: YELLOW
CREAT BLD-MCNC: 1.9 MG/DL (ref 0.6–1.3)
CREAT SERPL-MCNC: 1.9 MG/DL (ref 0.4–1.2)
CREAT UR-MCNC: 34.3 MG/DL
DEPRECATED RDW RBC AUTO: 56.3 FL (ref 37–54)
EOSINOPHIL # BLD AUTO: 0.12 10*3/MM3 (ref 0–0.4)
EOSINOPHIL NFR BLD AUTO: 2.4 % (ref 0.3–6.2)
EOSINOPHILS ABSOLUTE: 0.2 K/UL (ref 0–0.4)
EOSINOPHILS RELATIVE PERCENT: 3.5 %
ERYTHROCYTE [DISTWIDTH] IN BLOOD BY AUTOMATED COUNT: 18.5 % (ref 12.3–15.4)
GFR AFRICAN AMERICAN: 33
GFR NON-AFRICAN AMERICAN: 27
GFR SERPL CREATININE-BSD FRML MDRD: 27 ML/MIN/1.73
GFR SERPL CREATININE-BSD FRML MDRD: 33 ML/MIN/1.73
GLUCOSE BLD-MCNC: 200 MG/DL (ref 74–106)
GLUCOSE BLD-MCNC: 211 MG/DL (ref 74–98)
GLUCOSE BLDC GLUCOMTR-MCNC: 169 MG/DL (ref 70–130)
GLUCOSE BLDC GLUCOMTR-MCNC: 202 MG/DL (ref 70–130)
GLUCOSE BLDC GLUCOMTR-MCNC: 207 MG/DL (ref 70–130)
GLUCOSE UR STRIP-MCNC: NEGATIVE MG/DL
HBA1C MFR BLD: 8.4 %
HCT VFR BLD AUTO: 31.5 % (ref 34–46.6)
HCT VFR BLD CALC: 30 % (ref 37–47)
HEMOGLOBIN: 9 G/DL (ref 11.5–16.5)
HGB BLD-MCNC: 9.9 G/DL (ref 12–15.9)
HGB UR QL STRIP.AUTO: ABNORMAL
HYALINE CASTS UR QL AUTO: ABNORMAL /LPF
IMM GRANULOCYTES # BLD AUTO: 0.02 10*3/MM3 (ref 0–0.05)
IMM GRANULOCYTES NFR BLD AUTO: 0.4 % (ref 0–0.5)
IMMATURE GRANULOCYTES #: 0 K/UL
IMMATURE GRANULOCYTES %: 0.3 % (ref 0–5)
KETONES UR QL STRIP: NEGATIVE
LEUKOCYTE ESTERASE UR QL STRIP.AUTO: ABNORMAL
LYMPHOCYTES # BLD AUTO: 0.94 10*3/MM3 (ref 0.7–3.1)
LYMPHOCYTES ABSOLUTE: 1.2 K/UL (ref 1.5–4)
LYMPHOCYTES NFR BLD AUTO: 19.1 % (ref 19.6–45.3)
LYMPHOCYTES RELATIVE PERCENT: 20.9 %
MCH RBC QN AUTO: 25.1 PG (ref 27–32)
MCH RBC QN AUTO: 26.2 PG (ref 26.6–33)
MCHC RBC AUTO-ENTMCNC: 30 G/DL (ref 31–35)
MCHC RBC AUTO-ENTMCNC: 31.4 G/DL (ref 31.5–35.7)
MCV RBC AUTO: 83.3 FL (ref 79–97)
MCV RBC AUTO: 83.8 FL (ref 80–100)
MONOCYTES # BLD AUTO: 0.31 10*3/MM3 (ref 0.1–0.9)
MONOCYTES ABSOLUTE: 0.4 K/UL (ref 0.2–0.8)
MONOCYTES NFR BLD AUTO: 6.3 % (ref 5–12)
MONOCYTES RELATIVE PERCENT: 7.1 %
NEUTROPHILS # BLD AUTO: 3.52 10*3/MM3 (ref 1.7–7)
NEUTROPHILS ABSOLUTE: 3.9 K/UL (ref 2–7.5)
NEUTROPHILS NFR BLD AUTO: 71.4 % (ref 42.7–76)
NEUTROPHILS RELATIVE PERCENT: 67.7 %
NITRITE UR QL STRIP: POSITIVE
NRBC BLD AUTO-RTO: 0 /100 WBC (ref 0–0.2)
PDW BLD-RTO: 18.6 % (ref 11–16)
PH UR STRIP.AUTO: 5.5 [PH] (ref 5–8)
PHOSPHORUS: 5.6 MG/DL (ref 2.5–4.5)
PLATELET # BLD AUTO: 143 10*3/MM3 (ref 140–450)
PLATELET # BLD: 158 K/UL (ref 150–400)
PMV BLD AUTO: 10.1 FL (ref 6–12)
PMV BLD AUTO: 10.8 FL (ref 6–10)
POTASSIUM BLD-SCNC: 5.4 MMOL/L (ref 3.5–5.1)
POTASSIUM SERPL-SCNC: 5.2 MMOL/L (ref 3.4–5.1)
PROT UR QL STRIP: ABNORMAL
RBC # BLD AUTO: 3.78 10*6/MM3 (ref 3.77–5.28)
RBC # BLD: 3.58 M/UL (ref 3.8–5.8)
RBC # UR: ABNORMAL /HPF
REF LAB TEST METHOD: ABNORMAL
SODIUM BLD-SCNC: 136 MMOL/L (ref 136–145)
SODIUM BLD-SCNC: 138 MMOL/L (ref 137–145)
SODIUM UR-SCNC: 64 MMOL/L (ref 30–90)
SP GR UR STRIP: 1.01 (ref 1–1.03)
SQUAMOUS #/AREA URNS HPF: ABNORMAL /HPF
UROBILINOGEN UR QL STRIP: ABNORMAL
WBC # BLD: 5.8 K/UL (ref 4–11)
WBC NRBC COR # BLD: 4.93 10*3/MM3 (ref 3.4–10.8)
WBC UR QL AUTO: ABNORMAL /HPF

## 2019-04-29 PROCEDURE — 25010000002 ERTAPENEM PER 500 MG: Performed by: HOSPITALIST

## 2019-04-29 PROCEDURE — 87081 CULTURE SCREEN ONLY: CPT | Performed by: HOSPITALIST

## 2019-04-29 PROCEDURE — G0378 HOSPITAL OBSERVATION PER HR: HCPCS

## 2019-04-29 PROCEDURE — 87086 URINE CULTURE/COLONY COUNT: CPT | Performed by: HOSPITALIST

## 2019-04-29 PROCEDURE — 63710000001 INSULIN ASPART PER 5 UNITS: Performed by: HOSPITALIST

## 2019-04-29 PROCEDURE — 87186 SC STD MICRODIL/AGAR DIL: CPT | Performed by: HOSPITALIST

## 2019-04-29 PROCEDURE — 82962 GLUCOSE BLOOD TEST: CPT

## 2019-04-29 PROCEDURE — 80069 RENAL FUNCTION PANEL: CPT

## 2019-04-29 PROCEDURE — 99220 PR INITIAL OBSERVATION CARE/DAY 70 MINUTES: CPT | Performed by: HOSPITALIST

## 2019-04-29 PROCEDURE — 85025 COMPLETE CBC W/AUTO DIFF WBC: CPT

## 2019-04-29 PROCEDURE — 87077 CULTURE AEROBIC IDENTIFY: CPT | Performed by: HOSPITALIST

## 2019-04-29 PROCEDURE — 83036 HEMOGLOBIN GLYCOSYLATED A1C: CPT

## 2019-04-29 PROCEDURE — 81001 URINALYSIS AUTO W/SCOPE: CPT | Performed by: HOSPITALIST

## 2019-04-29 PROCEDURE — 84300 ASSAY OF URINE SODIUM: CPT | Performed by: HOSPITALIST

## 2019-04-29 PROCEDURE — 82570 ASSAY OF URINE CREATININE: CPT | Performed by: HOSPITALIST

## 2019-04-29 PROCEDURE — 85025 COMPLETE CBC W/AUTO DIFF WBC: CPT | Performed by: HOSPITALIST

## 2019-04-29 PROCEDURE — 80048 BASIC METABOLIC PNL TOTAL CA: CPT | Performed by: HOSPITALIST

## 2019-04-29 RX ORDER — DEXTROSE MONOHYDRATE 25 G/50ML
25 INJECTION, SOLUTION INTRAVENOUS
Status: DISCONTINUED | OUTPATIENT
Start: 2019-04-29 | End: 2019-05-03 | Stop reason: HOSPADM

## 2019-04-29 RX ORDER — SACCHAROMYCES BOULARDII 250 MG
250 CAPSULE ORAL 2 TIMES DAILY
COMMUNITY
End: 2022-04-14 | Stop reason: HOSPADM

## 2019-04-29 RX ORDER — HYDRALAZINE HYDROCHLORIDE 10 MG/1
10 TABLET, FILM COATED ORAL 2 TIMES DAILY
COMMUNITY
End: 2019-07-17

## 2019-04-29 RX ORDER — FERROUS SULFATE TAB EC 324 MG (65 MG FE EQUIVALENT) 324 (65 FE) MG
324 TABLET DELAYED RESPONSE ORAL 2 TIMES DAILY WITH MEALS
Status: DISCONTINUED | OUTPATIENT
Start: 2019-04-29 | End: 2019-05-03 | Stop reason: HOSPADM

## 2019-04-29 RX ORDER — LIDOCAINE 50 MG/G
1 PATCH TOPICAL EVERY 24 HOURS
COMMUNITY

## 2019-04-29 RX ORDER — MULTIPLE VITAMINS W/ MINERALS TAB 9MG-400MCG
1 TAB ORAL DAILY
COMMUNITY
End: 2021-05-05

## 2019-04-29 RX ORDER — BISOPROLOL FUMARATE 5 MG/1
10 TABLET, FILM COATED ORAL
Status: DISCONTINUED | OUTPATIENT
Start: 2019-04-30 | End: 2019-05-03 | Stop reason: HOSPADM

## 2019-04-29 RX ORDER — SODIUM POLYSTYRENE SULFONATE 15 G/60ML
15 SUSPENSION ORAL; RECTAL ONCE
Status: COMPLETED | OUTPATIENT
Start: 2019-04-29 | End: 2019-04-29

## 2019-04-29 RX ORDER — ATORVASTATIN CALCIUM 10 MG/1
10 TABLET, FILM COATED ORAL NIGHTLY
Status: DISCONTINUED | OUTPATIENT
Start: 2019-04-29 | End: 2019-05-03 | Stop reason: HOSPADM

## 2019-04-29 RX ORDER — POLYETHYLENE GLYCOL 3350 17 G/17G
17 POWDER, FOR SOLUTION ORAL 2 TIMES DAILY
Status: DISCONTINUED | OUTPATIENT
Start: 2019-04-29 | End: 2019-05-03 | Stop reason: HOSPADM

## 2019-04-29 RX ORDER — SODIUM CHLORIDE 0.9 % (FLUSH) 0.9 %
3-10 SYRINGE (ML) INJECTION AS NEEDED
Status: DISCONTINUED | OUTPATIENT
Start: 2019-04-29 | End: 2019-05-03 | Stop reason: HOSPADM

## 2019-04-29 RX ORDER — ISOSORBIDE MONONITRATE 10 MG/1
15 TABLET ORAL DAILY
COMMUNITY
End: 2022-04-14 | Stop reason: HOSPADM

## 2019-04-29 RX ORDER — ZINC SULFATE 50(220)MG
220 CAPSULE ORAL 2 TIMES DAILY
Status: DISCONTINUED | OUTPATIENT
Start: 2019-04-29 | End: 2019-05-03 | Stop reason: HOSPADM

## 2019-04-29 RX ORDER — GABAPENTIN 300 MG/1
300 CAPSULE ORAL NIGHTLY
Status: DISCONTINUED | OUTPATIENT
Start: 2019-04-29 | End: 2019-05-03 | Stop reason: HOSPADM

## 2019-04-29 RX ORDER — DOCUSATE SODIUM 100 MG/1
100 CAPSULE, LIQUID FILLED ORAL 2 TIMES DAILY
Status: ON HOLD | COMMUNITY
End: 2019-08-14

## 2019-04-29 RX ORDER — POLYETHYLENE GLYCOL 3350 17 G/17G
17 POWDER, FOR SOLUTION ORAL 2 TIMES DAILY
COMMUNITY
End: 2022-04-14 | Stop reason: HOSPADM

## 2019-04-29 RX ORDER — PANTOPRAZOLE SODIUM 40 MG/1
40 TABLET, DELAYED RELEASE ORAL
Status: DISCONTINUED | OUTPATIENT
Start: 2019-04-30 | End: 2019-05-03 | Stop reason: HOSPADM

## 2019-04-29 RX ORDER — ISOSORBIDE MONONITRATE 30 MG/1
15 TABLET, EXTENDED RELEASE ORAL DAILY
Status: DISCONTINUED | OUTPATIENT
Start: 2019-04-30 | End: 2019-05-03 | Stop reason: HOSPADM

## 2019-04-29 RX ORDER — BISOPROLOL FUMARATE 10 MG/1
20 TABLET, FILM COATED ORAL 2 TIMES DAILY
COMMUNITY
End: 2019-05-03 | Stop reason: HOSPADM

## 2019-04-29 RX ORDER — PANTOPRAZOLE SODIUM 40 MG/1
40 TABLET, DELAYED RELEASE ORAL DAILY
COMMUNITY
End: 2022-05-18 | Stop reason: HOSPADM

## 2019-04-29 RX ORDER — SODIUM CHLORIDE 0.9 % (FLUSH) 0.9 %
3 SYRINGE (ML) INJECTION EVERY 12 HOURS SCHEDULED
Status: DISCONTINUED | OUTPATIENT
Start: 2019-04-29 | End: 2019-05-03 | Stop reason: HOSPADM

## 2019-04-29 RX ORDER — ZINC SULFATE 50(220)MG
220 CAPSULE ORAL 2 TIMES DAILY
COMMUNITY
End: 2019-07-17

## 2019-04-29 RX ORDER — HYDRALAZINE HYDROCHLORIDE 10 MG/1
10 TABLET, FILM COATED ORAL 2 TIMES DAILY
Status: DISCONTINUED | OUTPATIENT
Start: 2019-04-29 | End: 2019-05-03 | Stop reason: HOSPADM

## 2019-04-29 RX ORDER — SACCHAROMYCES BOULARDII 250 MG
250 CAPSULE ORAL 2 TIMES DAILY
Status: DISCONTINUED | OUTPATIENT
Start: 2019-04-29 | End: 2019-05-03 | Stop reason: HOSPADM

## 2019-04-29 RX ORDER — LEVOTHYROXINE SODIUM 0.15 MG/1
150 TABLET ORAL
Status: DISCONTINUED | OUTPATIENT
Start: 2019-04-30 | End: 2019-05-03 | Stop reason: HOSPADM

## 2019-04-29 RX ORDER — ASCORBIC ACID 500 MG
500 TABLET ORAL DAILY
Status: DISCONTINUED | OUTPATIENT
Start: 2019-04-30 | End: 2019-05-03 | Stop reason: HOSPADM

## 2019-04-29 RX ORDER — SODIUM CHLORIDE 9 MG/ML
30 INJECTION, SOLUTION INTRAVENOUS CONTINUOUS
Status: DISCONTINUED | OUTPATIENT
Start: 2019-04-29 | End: 2019-05-03 | Stop reason: HOSPADM

## 2019-04-29 RX ORDER — LIDOCAINE 50 MG/G
1 PATCH TOPICAL EVERY 24 HOURS
Status: DISCONTINUED | OUTPATIENT
Start: 2019-04-30 | End: 2019-05-03 | Stop reason: HOSPADM

## 2019-04-29 RX ORDER — MULTIPLE VITAMINS W/ MINERALS TAB 9MG-400MCG
1 TAB ORAL DAILY
Status: DISCONTINUED | OUTPATIENT
Start: 2019-04-30 | End: 2019-05-03 | Stop reason: HOSPADM

## 2019-04-29 RX ORDER — ACETAMINOPHEN 325 MG/1
650 TABLET ORAL EVERY 4 HOURS PRN
Status: DISCONTINUED | OUTPATIENT
Start: 2019-04-29 | End: 2019-05-03 | Stop reason: HOSPADM

## 2019-04-29 RX ORDER — ONDANSETRON 2 MG/ML
4 INJECTION INTRAMUSCULAR; INTRAVENOUS EVERY 6 HOURS PRN
Status: DISCONTINUED | OUTPATIENT
Start: 2019-04-29 | End: 2019-05-03 | Stop reason: HOSPADM

## 2019-04-29 RX ORDER — NICOTINE POLACRILEX 4 MG
1 LOZENGE BUCCAL
Status: DISCONTINUED | OUTPATIENT
Start: 2019-04-29 | End: 2019-05-03 | Stop reason: HOSPADM

## 2019-04-29 RX ORDER — DOCUSATE SODIUM 100 MG/1
100 CAPSULE, LIQUID FILLED ORAL 2 TIMES DAILY
Status: DISCONTINUED | OUTPATIENT
Start: 2019-04-29 | End: 2019-05-03 | Stop reason: HOSPADM

## 2019-04-29 RX ORDER — ASPIRIN 81 MG/1
81 TABLET ORAL DAILY
Status: DISCONTINUED | OUTPATIENT
Start: 2019-04-30 | End: 2019-05-03 | Stop reason: HOSPADM

## 2019-04-29 RX ADMIN — HYDRALAZINE HYDROCHLORIDE 10 MG: 10 TABLET, FILM COATED ORAL at 20:42

## 2019-04-29 RX ADMIN — SODIUM CHLORIDE, PRESERVATIVE FREE 3 ML: 5 INJECTION INTRAVENOUS at 20:50

## 2019-04-29 RX ADMIN — Medication 250 MG: at 20:42

## 2019-04-29 RX ADMIN — GABAPENTIN 300 MG: 300 CAPSULE ORAL at 20:41

## 2019-04-29 RX ADMIN — INSULIN ASPART 2 UNITS: 100 INJECTION, SOLUTION INTRAVENOUS; SUBCUTANEOUS at 20:46

## 2019-04-29 RX ADMIN — FERROUS SULFATE TAB EC 324 MG (65 MG FE EQUIVALENT) 324 MG: 324 (65 FE) TABLET DELAYED RESPONSE at 17:04

## 2019-04-29 RX ADMIN — Medication 220 MG: at 20:41

## 2019-04-29 RX ADMIN — DOCUSATE SODIUM 100 MG: 100 CAPSULE, LIQUID FILLED ORAL at 20:42

## 2019-04-29 RX ADMIN — SODIUM CHLORIDE 100 ML/HR: 9 INJECTION, SOLUTION INTRAVENOUS at 16:53

## 2019-04-29 RX ADMIN — INSULIN ASPART 4 UNITS: 100 INJECTION, SOLUTION INTRAVENOUS; SUBCUTANEOUS at 16:54

## 2019-04-29 RX ADMIN — ATORVASTATIN CALCIUM 10 MG: 10 TABLET, FILM COATED ORAL at 20:41

## 2019-04-29 RX ADMIN — POLYETHYLENE GLYCOL 3350 17 G: 17 POWDER, FOR SOLUTION ORAL at 20:43

## 2019-04-29 RX ADMIN — ERTAPENEM SODIUM 500 MG: 1 INJECTION, POWDER, LYOPHILIZED, FOR SOLUTION INTRAMUSCULAR; INTRAVENOUS at 20:46

## 2019-04-29 RX ADMIN — SODIUM POLYSTYRENE SULFONATE 15 G: 15 SUSPENSION ORAL; RECTAL at 16:00

## 2019-04-29 NOTE — PROGRESS NOTES
vomiting, bloody stools or diarrhea . positive for constipation. :  Denies dysuria or frequency  Musculoskeletal:  Denies acute neck pain or body aches. Positive for chronic arthralgias. Integument:  Denies rash or itching. Positive for wound right lateral foot and left breast fold. Positive for BLE lymphedema with associated skin changes. Neurologic:  Denies headache, dizziness, numbness, tingling or unilateral weakness  Psychiatric:  Denies acute depression or acute anxiety    Vital Signs    /76, HR 76, Temp 97.6, O2 sat 96% on RA, RR 18     Physical Exam  Constitutional:  Well developed, morbidly obese, no acute distress  HENT:  Atraumatic, external ears normal, nose normal, oropharynx moist. Neck- supple  Respiratory:  No respiratory distress on room air, slightly diminished bilateral bases, no wheezing, rales or rhonchi detected  Cardiovascular:  normal rate, normal rhythm, no murmurs, no gallops, no rubs    GI:  Soft, obese with large pannus, normal bowel sounds, nontender, no voluntary guarding  Musculoskeletal:  No cyanosis or obvious acute deformity. Moving all extremities. Lymphedema B legs   Integument:  Warm and dry. Chronic skin changes noted to bilateral lower extremities related to lymphedema. Right lateral heel with well healing ulceration. Wound noted in left breast fold.    Neurologic:  Alert & oriented x 3, no apparent focal deficits noted   Psychiatric:  Speech and behavior appropriate    Lab Results   Component Value Date     04/29/2019    K 5.2 (H) 04/29/2019     04/29/2019    CO2 19 (L) 04/29/2019     (HH) 04/29/2019    CREATININE 1.9 (H) 04/29/2019    GLUCOSE 200 (H) 04/29/2019    CALCIUM 9.8 04/29/2019    PROT 8.6 (H) 04/16/2019    LABALBU 3.8 04/29/2019    BILITOT 0.3 04/16/2019    ALKPHOS 131 (H) 04/16/2019    AST 14 04/16/2019    ALT 12 04/16/2019    LABGLOM 27 (L) 04/29/2019    GFRAA 33 (L) 04/29/2019    AGRATIO 1.0 04/16/2019    GLOB 4.4 04/16/2019 Lab Results   Component Value Date    WBC 5.8 04/29/2019    HGB 9.0 (L) 04/29/2019    HCT 30.0 (L) 04/29/2019    MCV 83.8 04/29/2019     04/29/2019       Lab Results   Component Value Date    TSH 2.01 04/16/2019       Lab Results   Component Value Date    LABA1C 8.4 (H) 04/29/2019       ASSESSMENT/PLAN:     1. Declining functional status  PT/OT consulted. Patient with multiple chronic conditions which we will try to optimize as able. Monitor nutrition and labs periodically. Encourage participation in physical activity and facility activities. Long conversation with her regarding weight control. 2. Acute renal failure superimposed on stage 3 chronic kidney disease, unspecified acute renal failure type [facility])  Recently admitted at [facility] for worsening renal function with /Cr 2.4. in January her BUN was 58 and Cr 1.8. renal duplex unremarkable. Diuretic held. Hydrated with IVFs and patient very insistent on dc back to nursing facility so she was discharged to complete IVF hydration at the nursing home. Barney catheter left in while hydrating. Repeat labs few days ago with /Cr 2.1. she has appointment with nephrology Dr. Carmenza Guadalupe on 4/29 and may be considered for direct admit under his care at that time. Of note her barney catheter was discontinued on 2/76 and no complication noted. Try to avoid any nephrotoxic agent. Monitor blood pressure closely and make sure under good control. Monitor urine output. 3. Morbid obesity ([address]  Complicates all aspects of care. Encourage weight loss. Dietitian consulted at facility. Encourage increase activity level. 4. Pressure injury of skin of right heel, unspecified injury stage  Appears to be well healing. Continue local care per wound care physician recommendtions. Encourage off loading. 5. Chronic pain of left knee  Continue Lidoderm patch to knee . follow up with ortho as scheduled. PT/OT    6.  Type 2 diabetes mellitus with complication, with long-term current use of insulin (HCC)  Last A1C 6.6 in January. Will check A1C with next labs. Continue low dose sliding scale insulin coverage as indicated. 7. Anemia, unspecified type  Continue oral iron replacement. Hgb 9.8 with last labs. Check lab work Monday 4/29. Discuss upper and lower endoscopy with patient at next visit for her consideration if not done previously. Continue PPI. Need to address her uremia which could complicate this problem. 8. Hypothyroidism, unspecified type  Continue synthroid. Tsh 4/16 wnl. CBC, BMP, A1c ordered 04/29/19    Written by Esau Cruz CMA, acting as a scribe for Dr. Kim Turpin on 4/26/2019 at 9:55 AM.       I, Dr. Domenica Foote, personally performed the services described in the documentation as scribed by Esau Cruz CMA, in my presence and it is both accurate and complete.           Domenica Foote MD on 4/26/2019

## 2019-04-30 LAB
ANION GAP SERPL CALCULATED.3IONS-SCNC: 14 MMOL/L (ref 10–20)
ANION GAP SERPL CALCULATED.3IONS-SCNC: 14.9 MMOL/L (ref 10–20)
BASOPHILS # BLD AUTO: 0.02 10*3/MM3 (ref 0–0.2)
BASOPHILS NFR BLD AUTO: 0.4 % (ref 0–1.5)
BUN BLD-MCNC: 90 MG/DL (ref 7–20)
BUN BLD-MCNC: 96 MG/DL (ref 7–20)
BUN/CREAT SERPL: 50 (ref 7.1–23.5)
BUN/CREAT SERPL: 53.3 (ref 7.1–23.5)
CALCIUM SPEC-SCNC: 9.5 MG/DL (ref 8.4–10.2)
CALCIUM SPEC-SCNC: 9.6 MG/DL (ref 8.4–10.2)
CHLORIDE SERPL-SCNC: 106 MMOL/L (ref 98–107)
CHLORIDE SERPL-SCNC: 108 MMOL/L (ref 98–107)
CO2 SERPL-SCNC: 20 MMOL/L (ref 26–30)
CO2 SERPL-SCNC: 21 MMOL/L (ref 26–30)
COLLECT DURATION TIME UR: 24 HRS
COLLECT DURATION TIME UR: 24 HRS
CREAT BLD-MCNC: 1.8 MG/DL (ref 0.6–1.3)
CREAT BLD-MCNC: 1.8 MG/DL (ref 0.6–1.3)
CREAT CL 24H UR+SERPL-VRATE: 13.5 L/24 HR (ref 126.7–184.3)
CREAT CL 24H UR+SERPL-VRATE: 9.4 ML/MIN (ref 88–128)
CREAT UR-MCNC: 47.3 MG/DL
CREATINE 24H UR-MRATE: 0.31 G/24 HR (ref 0.8–2)
DEPRECATED RDW RBC AUTO: 56.9 FL (ref 37–54)
EOSINOPHIL # BLD AUTO: 0.16 10*3/MM3 (ref 0–0.4)
EOSINOPHIL NFR BLD AUTO: 3.2 % (ref 0.3–6.2)
ERYTHROCYTE [DISTWIDTH] IN BLOOD BY AUTOMATED COUNT: 18.7 % (ref 12.3–15.4)
FERRITIN SERPL-MCNC: 298 NG/ML (ref 11.1–264)
GFR SERPL CREATININE-BSD FRML MDRD: 29 ML/MIN/1.73
GFR SERPL CREATININE-BSD FRML MDRD: 29 ML/MIN/1.73
GFR SERPL CREATININE-BSD FRML MDRD: 35 ML/MIN/1.73
GFR SERPL CREATININE-BSD FRML MDRD: 35 ML/MIN/1.73
GLUCOSE BLD-MCNC: 177 MG/DL (ref 74–98)
GLUCOSE BLD-MCNC: 192 MG/DL (ref 74–98)
GLUCOSE BLDC GLUCOMTR-MCNC: 175 MG/DL (ref 70–130)
GLUCOSE BLDC GLUCOMTR-MCNC: 180 MG/DL (ref 70–130)
GLUCOSE BLDC GLUCOMTR-MCNC: 188 MG/DL (ref 70–130)
GLUCOSE BLDC GLUCOMTR-MCNC: 239 MG/DL (ref 70–130)
HCT VFR BLD AUTO: 30.7 % (ref 34–46.6)
HEMOCCULT STL QL: POSITIVE
HGB BLD-MCNC: 9.5 G/DL (ref 12–15.9)
IMM GRANULOCYTES # BLD AUTO: 0.01 10*3/MM3 (ref 0–0.05)
IMM GRANULOCYTES NFR BLD AUTO: 0.2 % (ref 0–0.5)
IRON 24H UR-MRATE: 64 MCG/DL (ref 37–181)
IRON SATN MFR SERPL: 22 % (ref 11–46)
LYMPHOCYTES # BLD AUTO: 1.58 10*3/MM3 (ref 0.7–3.1)
LYMPHOCYTES NFR BLD AUTO: 31.7 % (ref 19.6–45.3)
MCH RBC QN AUTO: 25.5 PG (ref 26.6–33)
MCHC RBC AUTO-ENTMCNC: 30.9 G/DL (ref 31.5–35.7)
MCV RBC AUTO: 82.5 FL (ref 79–97)
MONOCYTES # BLD AUTO: 0.48 10*3/MM3 (ref 0.1–0.9)
MONOCYTES NFR BLD AUTO: 9.6 % (ref 5–12)
NEUTROPHILS # BLD AUTO: 2.73 10*3/MM3 (ref 1.7–7)
NEUTROPHILS NFR BLD AUTO: 54.9 % (ref 42.7–76)
NRBC BLD AUTO-RTO: 0 /100 WBC (ref 0–0.2)
PLATELET # BLD AUTO: 155 10*3/MM3 (ref 140–450)
PMV BLD AUTO: 10.4 FL (ref 6–12)
POTASSIUM BLD-SCNC: 4.9 MMOL/L (ref 3.5–5.1)
POTASSIUM BLD-SCNC: 5 MMOL/L (ref 3.5–5.1)
PROT 24H UR-MRATE: 487.5 MG/24HOURS (ref 42–225)
PROT UR-MCNC: 75 MG/DL (ref 0–11.9)
RBC # BLD AUTO: 3.72 10*6/MM3 (ref 3.77–5.28)
RETICS # AUTO: 0.07 10*6/MM3 (ref 0.02–0.13)
RETICS/RBC NFR AUTO: 2 % (ref 0.7–1.9)
SODIUM BLD-SCNC: 137 MMOL/L (ref 137–145)
SODIUM BLD-SCNC: 137 MMOL/L (ref 137–145)
SPECIMEN VOL 24H UR: 650 ML
TIBC SERPL-MCNC: 296 MCG/DL (ref 261–497)
VIT B12 BLD-MCNC: 774 PG/ML (ref 239–931)
WBC NRBC COR # BLD: 4.98 10*3/MM3 (ref 3.4–10.8)

## 2019-04-30 PROCEDURE — 82747 ASSAY OF FOLIC ACID RBC: CPT | Performed by: INTERNAL MEDICINE

## 2019-04-30 PROCEDURE — 84156 ASSAY OF PROTEIN URINE: CPT | Performed by: HOSPITALIST

## 2019-04-30 PROCEDURE — 97162 PT EVAL MOD COMPLEX 30 MIN: CPT

## 2019-04-30 PROCEDURE — 81050 URINALYSIS VOLUME MEASURE: CPT | Performed by: HOSPITALIST

## 2019-04-30 PROCEDURE — 63710000001 INSULIN ASPART PER 5 UNITS: Performed by: HOSPITALIST

## 2019-04-30 PROCEDURE — 82575 CREATININE CLEARANCE TEST: CPT | Performed by: INTERNAL MEDICINE

## 2019-04-30 PROCEDURE — 82607 VITAMIN B-12: CPT | Performed by: INTERNAL MEDICINE

## 2019-04-30 PROCEDURE — 83010 ASSAY OF HAPTOGLOBIN QUANT: CPT | Performed by: INTERNAL MEDICINE

## 2019-04-30 PROCEDURE — 82272 OCCULT BLD FECES 1-3 TESTS: CPT | Performed by: HOSPITALIST

## 2019-04-30 PROCEDURE — 83540 ASSAY OF IRON: CPT | Performed by: INTERNAL MEDICINE

## 2019-04-30 PROCEDURE — 80048 BASIC METABOLIC PNL TOTAL CA: CPT | Performed by: HOSPITALIST

## 2019-04-30 PROCEDURE — 82962 GLUCOSE BLOOD TEST: CPT

## 2019-04-30 PROCEDURE — 99221 1ST HOSP IP/OBS SF/LOW 40: CPT | Performed by: SURGERY

## 2019-04-30 PROCEDURE — 84540 ASSAY OF URINE/UREA-N: CPT | Performed by: INTERNAL MEDICINE

## 2019-04-30 PROCEDURE — 99225 PR SBSQ OBSERVATION CARE/DAY 25 MINUTES: CPT | Performed by: HOSPITALIST

## 2019-04-30 PROCEDURE — 82728 ASSAY OF FERRITIN: CPT | Performed by: INTERNAL MEDICINE

## 2019-04-30 PROCEDURE — 97165 OT EVAL LOW COMPLEX 30 MIN: CPT

## 2019-04-30 PROCEDURE — 85014 HEMATOCRIT: CPT | Performed by: INTERNAL MEDICINE

## 2019-04-30 PROCEDURE — G0378 HOSPITAL OBSERVATION PER HR: HCPCS

## 2019-04-30 PROCEDURE — 25010000002 ERTAPENEM PER 500 MG: Performed by: HOSPITALIST

## 2019-04-30 PROCEDURE — 85025 COMPLETE CBC W/AUTO DIFF WBC: CPT | Performed by: HOSPITALIST

## 2019-04-30 PROCEDURE — 85045 AUTOMATED RETICULOCYTE COUNT: CPT | Performed by: INTERNAL MEDICINE

## 2019-04-30 PROCEDURE — 83550 IRON BINDING TEST: CPT | Performed by: INTERNAL MEDICINE

## 2019-04-30 RX ORDER — ASPIRIN 81 MG/1
81 TABLET, CHEWABLE ORAL DAILY
COMMUNITY
End: 2019-05-03 | Stop reason: HOSPADM

## 2019-04-30 RX ORDER — LEVOTHYROXINE SODIUM 0.05 MG/1
50 TABLET ORAL DAILY
COMMUNITY
End: 2019-05-03 | Stop reason: HOSPADM

## 2019-04-30 RX ORDER — FERROUS SULFATE TAB EC 324 MG (65 MG FE EQUIVALENT) 324 (65 FE) MG
324 TABLET DELAYED RESPONSE ORAL 2 TIMES DAILY WITH MEALS
COMMUNITY

## 2019-04-30 RX ORDER — ARGININE 500 MG
500 TABLET ORAL DAILY
COMMUNITY
End: 2022-04-14 | Stop reason: HOSPADM

## 2019-04-30 RX ORDER — AMMONIUM LACTATE 120 MG/G
1 CREAM TOPICAL 2 TIMES DAILY
COMMUNITY
End: 2022-04-14 | Stop reason: HOSPADM

## 2019-04-30 RX ORDER — LEVOFLOXACIN 500 MG/1
500 TABLET, FILM COATED ORAL EVERY OTHER DAY
COMMUNITY
End: 2019-05-03 | Stop reason: HOSPADM

## 2019-04-30 RX ORDER — IBUPROFEN 200 MG
TABLET ORAL DAILY
Status: DISCONTINUED | OUTPATIENT
Start: 2019-04-30 | End: 2019-05-03 | Stop reason: HOSPADM

## 2019-04-30 RX ORDER — ACETAMINOPHEN 325 MG/1
650 TABLET ORAL EVERY 4 HOURS PRN
COMMUNITY
End: 2022-04-14 | Stop reason: HOSPADM

## 2019-04-30 RX ADMIN — Medication 250 MG: at 08:44

## 2019-04-30 RX ADMIN — SODIUM CHLORIDE 100 ML/HR: 9 INJECTION, SOLUTION INTRAVENOUS at 04:00

## 2019-04-30 RX ADMIN — LIDOCAINE 1 PATCH: 50 PATCH CUTANEOUS at 08:42

## 2019-04-30 RX ADMIN — Medication 220 MG: at 20:28

## 2019-04-30 RX ADMIN — HYDRALAZINE HYDROCHLORIDE 10 MG: 10 TABLET, FILM COATED ORAL at 20:28

## 2019-04-30 RX ADMIN — HYDRALAZINE HYDROCHLORIDE 10 MG: 10 TABLET, FILM COATED ORAL at 08:43

## 2019-04-30 RX ADMIN — MULTIPLE VITAMINS W/ MINERALS TAB 1 TABLET: TAB at 08:44

## 2019-04-30 RX ADMIN — GABAPENTIN 300 MG: 300 CAPSULE ORAL at 20:28

## 2019-04-30 RX ADMIN — POLYETHYLENE GLYCOL 3350 17 G: 17 POWDER, FOR SOLUTION ORAL at 20:27

## 2019-04-30 RX ADMIN — INSULIN ASPART 2 UNITS: 100 INJECTION, SOLUTION INTRAVENOUS; SUBCUTANEOUS at 21:01

## 2019-04-30 RX ADMIN — ERTAPENEM SODIUM 1 G: 1 INJECTION, POWDER, LYOPHILIZED, FOR SOLUTION INTRAMUSCULAR; INTRAVENOUS at 20:27

## 2019-04-30 RX ADMIN — BISOPROLOL FUMARATE 10 MG: 5 TABLET ORAL at 08:42

## 2019-04-30 RX ADMIN — SODIUM CHLORIDE, PRESERVATIVE FREE 3 ML: 5 INJECTION INTRAVENOUS at 08:45

## 2019-04-30 RX ADMIN — Medication 250 MG: at 20:28

## 2019-04-30 RX ADMIN — PANTOPRAZOLE SODIUM 40 MG: 40 TABLET, DELAYED RELEASE ORAL at 06:41

## 2019-04-30 RX ADMIN — SODIUM CHLORIDE, PRESERVATIVE FREE 3 ML: 5 INJECTION INTRAVENOUS at 20:28

## 2019-04-30 RX ADMIN — LEVOTHYROXINE SODIUM 150 MCG: 150 TABLET ORAL at 06:41

## 2019-04-30 RX ADMIN — FERROUS SULFATE TAB EC 324 MG (65 MG FE EQUIVALENT) 324 MG: 324 (65 FE) TABLET DELAYED RESPONSE at 17:38

## 2019-04-30 RX ADMIN — ASPIRIN 81 MG: 81 TABLET, COATED ORAL at 08:44

## 2019-04-30 RX ADMIN — FERROUS SULFATE TAB EC 324 MG (65 MG FE EQUIVALENT) 324 MG: 324 (65 FE) TABLET DELAYED RESPONSE at 08:44

## 2019-04-30 RX ADMIN — OXYCODONE HYDROCHLORIDE AND ACETAMINOPHEN 500 MG: 500 TABLET ORAL at 08:43

## 2019-04-30 RX ADMIN — INSULIN ASPART 2 UNITS: 100 INJECTION, SOLUTION INTRAVENOUS; SUBCUTANEOUS at 17:37

## 2019-04-30 RX ADMIN — INSULIN ASPART 3 UNITS: 100 INJECTION, SOLUTION INTRAVENOUS; SUBCUTANEOUS at 12:29

## 2019-04-30 RX ADMIN — ISOSORBIDE MONONITRATE 15 MG: 30 TABLET, EXTENDED RELEASE ORAL at 08:44

## 2019-04-30 RX ADMIN — ATORVASTATIN CALCIUM 10 MG: 10 TABLET, FILM COATED ORAL at 20:28

## 2019-04-30 RX ADMIN — INSULIN ASPART 2 UNITS: 100 INJECTION, SOLUTION INTRAVENOUS; SUBCUTANEOUS at 06:42

## 2019-04-30 RX ADMIN — DOCUSATE SODIUM 100 MG: 100 CAPSULE, LIQUID FILLED ORAL at 08:43

## 2019-04-30 RX ADMIN — Medication 1 APPLICATION: at 15:30

## 2019-04-30 RX ADMIN — DOCUSATE SODIUM 100 MG: 100 CAPSULE, LIQUID FILLED ORAL at 20:27

## 2019-04-30 RX ADMIN — Medication 220 MG: at 08:44

## 2019-05-01 ENCOUNTER — APPOINTMENT (OUTPATIENT)
Dept: GENERAL RADIOLOGY | Facility: HOSPITAL | Age: 59
End: 2019-05-01

## 2019-05-01 LAB
ANION GAP SERPL CALCULATED.3IONS-SCNC: 15.1 MMOL/L (ref 10–20)
BUN BLD-MCNC: 82 MG/DL (ref 7–20)
BUN/CREAT SERPL: 48.2 (ref 7.1–23.5)
CALCIUM SPEC-SCNC: 9.5 MG/DL (ref 8.4–10.2)
CHLORIDE SERPL-SCNC: 110 MMOL/L (ref 98–107)
CO2 SERPL-SCNC: 18 MMOL/L (ref 26–30)
CREAT BLD-MCNC: 1.7 MG/DL (ref 0.6–1.3)
DEPRECATED RDW RBC AUTO: 57.7 FL (ref 37–54)
ERYTHROCYTE [DISTWIDTH] IN BLOOD BY AUTOMATED COUNT: 18.8 % (ref 12.3–15.4)
FOLATE BLD-MCNC: 447.3 NG/ML
FOLATE RBC-MCNC: 1496 NG/ML
GFR SERPL CREATININE-BSD FRML MDRD: 31 ML/MIN/1.73
GFR SERPL CREATININE-BSD FRML MDRD: 37 ML/MIN/1.73
GLUCOSE BLD-MCNC: 170 MG/DL (ref 74–98)
GLUCOSE BLDC GLUCOMTR-MCNC: 174 MG/DL (ref 70–130)
GLUCOSE BLDC GLUCOMTR-MCNC: 180 MG/DL (ref 70–130)
GLUCOSE BLDC GLUCOMTR-MCNC: 182 MG/DL (ref 70–130)
GLUCOSE BLDC GLUCOMTR-MCNC: 194 MG/DL (ref 70–130)
HAPTOGLOB SERPL-MCNC: 138 MG/DL (ref 34–200)
HCT VFR BLD AUTO: 29.6 % (ref 34–46.6)
HCT VFR BLD AUTO: 29.9 % (ref 34–46.6)
HGB BLD-MCNC: 9.2 G/DL (ref 12–15.9)
MCH RBC QN AUTO: 25.8 PG (ref 26.6–33)
MCHC RBC AUTO-ENTMCNC: 31.1 G/DL (ref 31.5–35.7)
MCV RBC AUTO: 82.9 FL (ref 79–97)
PLATELET # BLD AUTO: 158 10*3/MM3 (ref 140–450)
PMV BLD AUTO: 10.7 FL (ref 6–12)
POTASSIUM BLD-SCNC: 5.1 MMOL/L (ref 3.5–5.1)
RBC # BLD AUTO: 3.57 10*6/MM3 (ref 3.77–5.28)
SODIUM BLD-SCNC: 138 MMOL/L (ref 137–145)
UUN 24H UR-MCNC: 844 MG/DL
UUN 24H UR-MRATE: 5 G/24 HR (ref 12–20)
WBC NRBC COR # BLD: 5.28 10*3/MM3 (ref 3.4–10.8)

## 2019-05-01 PROCEDURE — 82962 GLUCOSE BLOOD TEST: CPT

## 2019-05-01 PROCEDURE — 97110 THERAPEUTIC EXERCISES: CPT

## 2019-05-01 PROCEDURE — G0378 HOSPITAL OBSERVATION PER HR: HCPCS

## 2019-05-01 PROCEDURE — 63710000001 INSULIN ASPART PER 5 UNITS: Performed by: HOSPITALIST

## 2019-05-01 PROCEDURE — 97530 THERAPEUTIC ACTIVITIES: CPT

## 2019-05-01 PROCEDURE — 25010000002 ERTAPENEM PER 500 MG: Performed by: HOSPITALIST

## 2019-05-01 PROCEDURE — 73562 X-RAY EXAM OF KNEE 3: CPT

## 2019-05-01 PROCEDURE — 99225 PR SBSQ OBSERVATION CARE/DAY 25 MINUTES: CPT | Performed by: HOSPITALIST

## 2019-05-01 PROCEDURE — 80048 BASIC METABOLIC PNL TOTAL CA: CPT | Performed by: HOSPITALIST

## 2019-05-01 PROCEDURE — 85027 COMPLETE CBC AUTOMATED: CPT | Performed by: HOSPITALIST

## 2019-05-01 PROCEDURE — 99213 OFFICE O/P EST LOW 20 MIN: CPT | Performed by: SURGERY

## 2019-05-01 RX ORDER — AMMONIUM LACTATE 12 G/100G
LOTION TOPICAL AS NEEDED
Status: DISCONTINUED | OUTPATIENT
Start: 2019-05-01 | End: 2019-05-03 | Stop reason: HOSPADM

## 2019-05-01 RX ADMIN — OXYCODONE HYDROCHLORIDE AND ACETAMINOPHEN 500 MG: 500 TABLET ORAL at 09:09

## 2019-05-01 RX ADMIN — HYDRALAZINE HYDROCHLORIDE 10 MG: 10 TABLET, FILM COATED ORAL at 09:09

## 2019-05-01 RX ADMIN — BISOPROLOL FUMARATE 10 MG: 5 TABLET ORAL at 09:07

## 2019-05-01 RX ADMIN — INSULIN ASPART 2 UNITS: 100 INJECTION, SOLUTION INTRAVENOUS; SUBCUTANEOUS at 12:08

## 2019-05-01 RX ADMIN — FERROUS SULFATE TAB EC 324 MG (65 MG FE EQUIVALENT) 324 MG: 324 (65 FE) TABLET DELAYED RESPONSE at 17:15

## 2019-05-01 RX ADMIN — FERROUS SULFATE TAB EC 324 MG (65 MG FE EQUIVALENT) 324 MG: 324 (65 FE) TABLET DELAYED RESPONSE at 09:08

## 2019-05-01 RX ADMIN — SODIUM CHLORIDE, PRESERVATIVE FREE 3 ML: 5 INJECTION INTRAVENOUS at 20:42

## 2019-05-01 RX ADMIN — Medication 1 APPLICATION: at 12:08

## 2019-05-01 RX ADMIN — GABAPENTIN 300 MG: 300 CAPSULE ORAL at 20:42

## 2019-05-01 RX ADMIN — ISOSORBIDE MONONITRATE 15 MG: 30 TABLET, EXTENDED RELEASE ORAL at 09:08

## 2019-05-01 RX ADMIN — PANTOPRAZOLE SODIUM 40 MG: 40 TABLET, DELAYED RELEASE ORAL at 06:40

## 2019-05-01 RX ADMIN — INSULIN ASPART 2 UNITS: 100 INJECTION, SOLUTION INTRAVENOUS; SUBCUTANEOUS at 06:52

## 2019-05-01 RX ADMIN — SODIUM CHLORIDE 100 ML/HR: 9 INJECTION, SOLUTION INTRAVENOUS at 13:16

## 2019-05-01 RX ADMIN — HYDRALAZINE HYDROCHLORIDE 10 MG: 10 TABLET, FILM COATED ORAL at 20:42

## 2019-05-01 RX ADMIN — Medication 250 MG: at 09:10

## 2019-05-01 RX ADMIN — ERTAPENEM SODIUM 1 G: 1 INJECTION, POWDER, LYOPHILIZED, FOR SOLUTION INTRAMUSCULAR; INTRAVENOUS at 20:42

## 2019-05-01 RX ADMIN — ASPIRIN 81 MG: 81 TABLET, COATED ORAL at 09:09

## 2019-05-01 RX ADMIN — DOCUSATE SODIUM 100 MG: 100 CAPSULE, LIQUID FILLED ORAL at 20:42

## 2019-05-01 RX ADMIN — MULTIPLE VITAMINS W/ MINERALS TAB 1 TABLET: TAB at 09:07

## 2019-05-01 RX ADMIN — Medication 220 MG: at 09:09

## 2019-05-01 RX ADMIN — SODIUM CHLORIDE 100 ML/HR: 9 INJECTION, SOLUTION INTRAVENOUS at 06:41

## 2019-05-01 RX ADMIN — INSULIN ASPART 2 UNITS: 100 INJECTION, SOLUTION INTRAVENOUS; SUBCUTANEOUS at 17:15

## 2019-05-01 RX ADMIN — Medication 250 MG: at 20:42

## 2019-05-01 RX ADMIN — Medication 220 MG: at 20:42

## 2019-05-01 RX ADMIN — LEVOTHYROXINE SODIUM 150 MCG: 150 TABLET ORAL at 06:40

## 2019-05-01 RX ADMIN — DOCUSATE SODIUM 100 MG: 100 CAPSULE, LIQUID FILLED ORAL at 09:07

## 2019-05-01 RX ADMIN — ATORVASTATIN CALCIUM 10 MG: 10 TABLET, FILM COATED ORAL at 20:42

## 2019-05-01 RX ADMIN — LIDOCAINE 1 PATCH: 50 PATCH CUTANEOUS at 09:07

## 2019-05-01 RX ADMIN — Medication: at 20:42

## 2019-05-01 RX ADMIN — INSULIN ASPART 2 UNITS: 100 INJECTION, SOLUTION INTRAVENOUS; SUBCUTANEOUS at 20:58

## 2019-05-01 RX ADMIN — Medication 1 APPLICATION: at 09:09

## 2019-05-01 NOTE — PLAN OF CARE
Problem: Patient Care Overview  Goal: Plan of Care Review  Outcome: Ongoing (interventions implemented as appropriate)   05/01/19 5200   Coping/Psychosocial   Plan of Care Reviewed With patient   Plan of Care Review   Progress improving   OTHER   Outcome Summary OT tx completed. patient completed bed mobility with max A, sat EOB with CGA, requested return to dupine d/t L knee pain. Completed B UE ther ex using theraband. Continue OT POC

## 2019-05-01 NOTE — PROGRESS NOTES
[facility]    Name: Millicent Mckeon, 58 y.o. female  MRN: [id number], : 1960   Date of Admission: 2019   Date of Service: 19   PCP: Rosa Zamora MD     Hospital Course:   Ms. Millicent Mckeon is a(n) 58 y.o. year old female with a history of diabetes mellitus type 2, hypothyroidism, GERD, HTN who was directly admitted on 2019 from [facility] rehab to [facility] with progressive renal failure and hyperkalemia.  She has been a resident at [facility] rehab for the past 2 to 3 months for rehab.  She ambulates with a walker.      She was accepted as a direct admission today by Dr. Leone.  He was contacted by Dr. Zamora from [facility] for progressive renal failure.  Patient had recently been hospitalized there for the same and was discharged to SNF 2 days ago for outpatient follow-up with nephrology.  Clay catheter was removed 2 days ago.  Since that time patient reports dysuria.       Consultants: Dr. Leone; Dr. Cardoso  Procedures:  clay placement  Antibiotics: ertapenem d3  Micro:  UCx: >100K Klebsiella pneumoniae  -------------------------------------------------------------------------------------------------------------------  Subjective   Chief Complaint: f/u UTI and acute renal failure    Subjective:   Patient seen and examined this morning.  Tele reviewed paroxysmal a flutter. Events from last night noted. Discussed with JANAE MELVIN. Tolerating p.o.  Reports had bowel movement last night with some blood noted by PCT.  Occult stool positive for blood.  Denies any abdominal pain, dizziness or lightheadedness.  Reports left knee pain that is chronic, she was to see an orthopedic specialist as outpatient.    Review of Systems:   Gen-no fevers, no chills  CV-no chest pain, no palpitations  Resp-no cough, no dyspnea  GI-no N/V/D, no abd pain     Objective   Objective:     Intake/Output Summary (Last 24 hours) at  5/1/2019 1106  Last data filed at 5/1/2019 0948  Gross per 24 hour   Intake 2193 ml   Output 250 ml   Net 1943 ml      SpO2: 99 %     Physical Examination:   Vitals:    04/30/19 2312 05/01/19 0317 05/01/19 0500 05/01/19 0803   BP: 132/86 147/88  136/83   BP Location: Left arm Left arm  Left arm   Patient Position: Lying Lying  Lying   Pulse: 108   111   Resp: 16 16  17   Temp: 98.4 °F (36.9 °C) 98.5 °F (36.9 °C)  98.3 °F (36.8 °C)   TempSrc: Oral Oral  Oral   SpO2: 98% 100%  99%   Weight:   120 kg (265 lb)    Height:          General:  Chronically ill middle-aged female, pleasant, morbidly obese, resting in bed, no acute distress  Heart:   RRR, S1 S2 normal, no m/r/g  Lungs:   CTAB, no wheezes  Abdomen:  soft, NT, ND, +BS  Extremities: Lichenification of lower extremities; no edema/cyanosis/clubbing  Neuro:  A&Ox3, no focal deficits  Psych:  appropriate mood  Skin:  No bleeding, No bruising, No rash    Pertinent laboratory and radiology data were reviewed:  Microbiology Results (last 10 days)     Procedure Component Value - Date/Time    Urine Culture - Urine, Urine, Catheter [[id number]]  (Abnormal) Collected:  04/29/19 1632    Lab Status:  Preliminary result Specimen:  Urine, Catheter Updated:  05/01/19 0654     Urine Culture >100,000 CFU/mL Klebsiella pneumoniae     Comment: Sensitivity to follow.                Medications Reviewed:     aspirin 81 mg Oral Daily   atorvastatin 10 mg Oral Nightly   bisoprolol 10 mg Oral Q24H   docusate sodium 100 mg Oral BID   ertapenem 1 g Intravenous Q24H   ferrous sulfate 324 mg Oral BID With Meals   gabapentin 300 mg Oral Nightly   hydrALAZINE 10 mg Oral BID   insulin aspart 0-9 Units Subcutaneous 4x Daily AC & at Bedtime   isosorbide mononitrate 15 mg Oral Daily   levothyroxine 150 mcg Oral Q AM   lidocaine 1 patch Transdermal Q24H   multivitamin with minerals 1 tablet Oral Daily   neomycin-bacitracin-polymyxin  Topical Daily   pantoprazole 40 mg Oral Q AM   polyethylene glycol  17 g Oral BID   saccharomyces boulardii 250 mg Oral BID   sodium chloride 3 mL Intravenous Q12H   ascorbic acid 500 mg Oral Daily   zinc sulfate 220 mg Oral BID        Assessment /Plan   Assessment/Plan:   Millicent Mckeon is a(n) 58 y.o. year old female with:      1. Acute on chronic (stage III) renal failure, POA, improved  2. Hyperkalemia, POA, resolved  3. Acute UTI due to Klebsiella pneumonia, POA, recent Wilkinson catheter placement outside hospital  4. Left inframammary wound, POA  5. Normocytic anemia, hemoglobin at baseline, occult stool positive  6. Diabetes mellitus type 2 with long-term insulin use  7. Hypothyroidism  8. HTN  9. Left knee pain, POA  10. Morbid obesity.  BMI 46.  Complicates all aspects of care.  Weight loss encouraged.  11. History of ESBL Klebsiella infection       Creatinine mildly improved, however due to poor creatinine clearance patient may need dialysis, per discussion with Dr. Leone.  Awaiting 24-hour urine urea results to discuss this further.  Occult stool positive, discussed with Dr. Cardoso, planning reevaluation for possible EGD.  Hemoglobin and heme hemodynamically stable.  Completes antibiotic therapy for UTI this evening, expect sensitivities to be resulted tomorrow.  Continue contact precautions.  Glucose control has improved with higher dose sliding scale.  Will check x-rays of left knee.  Continue Lidoderm patch.  May need to have Ortho consulted.        F/E/N: IVF; diabetic/renal  DVT PPx: SCDs  GI PPx: not indicated     Reason for continued hospitalization: above  Disposition: possible discharge in 2-3 days pending above  Discussed with: patient, RN Kierra, Dr. Leone, Dr. Walker Gregg MD   11:06 AM on 5/1/2019

## 2019-05-01 NOTE — PLAN OF CARE
Problem: Patient Care Overview  Goal: Plan of Care Review  Outcome: Ongoing (interventions implemented as appropriate)   05/01/19 1383   Coping/Psychosocial   Plan of Care Reviewed With patient   Plan of Care Review   Progress no change   OTHER   Outcome Summary Pt declined any EOB or OOB activities. Pt was willing to perform bed exercises for which pt did 1x15 reps . See flowsheet for details.

## 2019-05-01 NOTE — THERAPY TREATMENT NOTE
Acute Care - Physical Therapy Treatment Note  [facility]     Patient Name: Millicent Mckeon  : 1960  MRN: [id number]  Today's Date: 2019  Onset of Illness/Injury or Date of Surgery: 19  Date of Referral to PT: 19  Referring Physician: Dr. Gregg    Admit Date: 2019    Visit Dx:    ICD-10-CM ICD-9-CM   1. Impaired functional mobility and activity tolerance Z74.09 V49.89   2. Impaired mobility and ADLs Z74.09 799.89     Patient Active Problem List   Diagnosis   • Altered mental status   • Bilateral edema of lower extremity   • Cellulitis of lower extremity   • Acute on chronic renal failure (CMS/HCC)   • GERD (gastroesophageal reflux disease)   • Hyperkalemia   • Essential hypertension   • Hypothermia   • Hypothyroidism   • Type 2 diabetes mellitus with complication (CMS/[facility])   • Vitamin B12 deficiency   • Cellulitis of both lower extremities   • Acute metabolic encephalopathy   • Anemia due to stage 4 chronic kidney disease (CMS/[facility])   • Acute renal failure (ARF) (CMS/[facility])   • Hyperkalemia       Therapy Treatment    Rehabilitation Treatment Summary     Row Name 19 1416 19 1336          Treatment Time/Intention    Discipline  physical therapy assistant  -RM  occupational therapist  -SD     Document Type  therapy note (daily note)  -RM  therapy note (daily note)  -SD     Subjective Information  complains of;pain  -RM  complains of;pain  -SD     Mode of Treatment  physical therapy  -RM  occupational therapy  -SD     Patient/Family Observations  --  supine in bed  -SD     Care Plan Review  care plan/treatment goals reviewed  -RM  care plan/treatment goals reviewed  -SD     Patient Effort  fair  -RM  fair  -SD     Comment  --  sat EOB - required return to bed d/t L knee pain  -SD     Existing Precautions/Restrictions  fall  -RM  fall  -SD     Recorded by [RM] Davion Arias, PTA 19 1627 [SD] Hannah Santos, OT 19 1549     Row Name 19 1415              Safety Issues, Functional Mobility    Safety Issues Affecting Function (Mobility)  friction/shear risk  -RM      Impairments Affecting Function (Mobility)  pain;strength  -RM      Recorded by [RM] Davion Arias, PTA 05/01/19 1627      Row Name 05/01/19 1416             Mobility Assessment/Intervention    Left Lower Extremity (Weight-bearing Status)  weight-bearing as tolerated (WBAT)  -RM      Recorded by [RM] Davion Arias, PTA 05/01/19 1627      Row Name 05/01/19 1336             Bed Mobility Assessment/Treatment    Bed Mobility Assessment/Treatment  supine-sit;sit-supine  -SD      Scooting/Bridging [place] (Bed Mobility)  maximum assist (25% patient effort)  -SD      Supine-Sit [place] (Bed Mobility)  maximum assist (25% patient effort)  -SD      Assistive Device (Bed Mobility)  bed rails;draw sheet;head of bed elevated  -SD      Recorded by [SD] Hannah Santos OT 05/01/19 1549      Row Name 05/01/19 1336             Motor Skills Assessment/Interventions    Additional Documentation  Therapeutic Exercise (Group)  -SD      Recorded by [SD] Hannha Santos OT 05/01/19 1549      Row Name 05/01/19 1416 05/01/19 1336          Therapeutic Exercise    Upper Extremity Range of Motion (Therapeutic Exercise)  --  shoulder flexion/extension, bilateral;shoulder abduction/adduction, bilateral;shoulder horizontal abduction/adduction, bilateral;shoulder internal/external rotation, bilateral;elbow flexion/extension, bilateral  -SD     Lower Extremity (Therapeutic Exercise)  gluteal sets;gastroc stretch, bilateral;heel slides, right;quad sets, bilateral;SLR (straight leg raise), bilateral  -RM  --     Weight/Resistance (Therapeutic Exercise)  --  yellow  -SD     Exercise Type (Therapeutic Exercise)  --  resistive exercises  -SD     Position (Therapeutic Exercise)  supine  -RM  supine  -SD     Sets/Reps (Therapeutic Exercise)  1x15  -RM  1 x 15  -SD     Equipment (Therapeutic Exercise)  --  resistive bands   -SD     Expected Outcome (Therapeutic Exercise)  --  improve functional tolerance, self-care activity  -SD     Recorded by [RM] Davion Arias, PTA 05/01/19 1627 [SD] Hannah Santos, OT 05/01/19 1549     Row Name 05/01/19 1416 05/01/19 1336          Positioning and Restraints    Pre-Treatment Position  in bed  -RM  in bed  -SD     Post Treatment Position  bed  -RM  bed  -SD     In Bed  supine;call light within reach;encouraged to call for assist;notified nsg  -RM  supine;encouraged to call for assist;call light within reach  -SD     Recorded by [RM] Davion Arias, PTA 05/01/19 1627 [SD] Hannah Santos, OT 05/01/19 1549     Row Name 05/01/19 1416 05/01/19 1336          Pain Scale: Numbers Pre/Post-Treatment    Pain Scale: Numbers, Pretreatment  4/10  -RM  4/10  -SD     Pain Scale: Numbers, Post-Treatment  4/10  -RM  4/10  -SD     Pain Location - Side  Left  -RM  Left  -SD     Pain Location  knee  -RM  knee  -SD     Pain Intervention(s)  --  Repositioned  -SD     Recorded by [RM] Davion Arias, PTA 05/01/19 1627 [SD] Hannah Santos, OT 05/01/19 1549     Row Name                Wound 04/29/19 1136 Right foot diabetic/neuropathic ulceration    Wound - Properties Group Date first assessed: 04/29/19 [MT] Time first assessed: 1136 [MT] Side: Right [MT] Location: foot [MT] Type: diabetic/neuropathic ulceration [MT] Recorded by:  [MT] Krys Cooper RN 04/29/19 1137    Row Name                Wound 04/29/19 1151 Left upper breast abscess    Wound - Properties Group Date first assessed: 04/29/19 [MT] Time first assessed: 1151 [MT] Side: Left [MT] Orientation: upper [MT] Location: breast [MT2] Type: abscess [MT2] Recorded by:  [MT] Krys Cooper RN 04/29/19 1151 [MT2] Krys Cooper RN 04/29/19 1152    Row Name 05/01/19 1336             Coping    Observed Emotional State  calm;cooperative  -SD      Verbalized Emotional State  acceptance  -SD      Recorded by [SD] Hannah Santos, OT 05/01/19 6287       Row Name 05/01/19 1336             Plan of Care Review    Plan of Care Reviewed With  patient  -SD      Recorded by [SD] Hannah Santos OT 05/01/19 1549      Row Name 05/01/19 1336             Outcome Summary/Treatment Plan (OT)    Daily Summary of Progress (OT)  progress toward functional goals is gradual  -SD      Anticipated Discharge Disposition (OT)  inpatient rehabilitation facility  -SD      Recorded by [SD] Hannah Santos OT 05/01/19 1549      Row Name 05/01/19 1416             Outcome Summary/Treatment Plan (PT)    Daily Summary of Progress (PT)  progress toward functional goals is gradual  -RM      Plan for Continued Treatment (PT)  COnt PT per POC.   -RM      Recorded by [RM] Davion Arias, PTA 05/01/19 1627        User Key  (r) = Recorded By, (t) = Taken By, (c) = Cosigned By    Initials Name Effective Dates Discipline    RM Davion Arias, PTA 03/07/18 -  PT    Krys Gomez RN 02/08/17 -  Nurse    Hannah Williamson OT 03/07/18 -  OT          Wound 04/29/19 1136 Right foot diabetic/neuropathic ulceration (Active)   Dressing Appearance no drainage;dry 5/1/2019  4:00 PM   Closure Open to air 5/1/2019  4:00 PM   Base clean 4/30/2019  6:00 PM   Periwound dry 4/30/2019  6:00 PM   Periwound Temperature warm 4/30/2019  6:00 PM   Periwound Skin Turgor firm 4/30/2019  6:00 PM   Edges irregular 4/30/2019  6:00 PM   Drainage Amount none 4/30/2019  6:00 PM       Wound 04/29/19 1151 Left upper breast abscess (Active)   Dressing Appearance dry;intact 5/1/2019  4:00 PM   Closure VINICIUS 5/1/2019  4:00 PM   Base red;moist 4/30/2019  6:00 PM   Periwound moist 4/30/2019  6:00 PM   Periwound Temperature warm 4/30/2019  6:00 PM   Periwound Skin Turgor soft 4/30/2019  6:00 PM   Edges irregular 4/30/2019  6:00 PM   Drainage Amount none 4/30/2019  6:00 PM       Rehab Goal Summary     Row Name 05/01/19 1336             Bed Mobility Goal 1 (OT)    Progress/Outcomes (Bed Mobility Goal 1, OT)  goal ongoing   -SD         Transfer Goal 1 (OT)    Progress/Outcome (Transfer Goal 1, OT)  goal ongoing  -SD         Dressing Goal 1 (OT)    Progress/Outcome (Dressing Goal 1, OT)  goal ongoing  -SD         Strength Goal 1 (OT)    Progress/Outcome (Strength Goal 1, OT)  goal met  -SD        User Key  (r) = Recorded By, (t) = Taken By, (c) = Cosigned By    Initials Name Provider Type Discipline    Hannah Williamson, OT Occupational Therapist OT          Physical Therapy Education     Title: PT OT SLP Therapies (In Progress)     Topic: Physical Therapy (Done)     Point: Mobility training (Done)     Learning Progress Summary           Patient Acceptance, E,D, VU,NR by TW at 4/30/2019 10:54 AM    Comment:  Pt education on fall precautions, correct body mechanics with bed mobility and transfers.                   Point: Home exercise program (Done)     Learning Progress Summary           Patient Acceptance, E,TB,D, VU,NR by  at 5/1/2019  4:27 PM    Comment:  Exercise technique                   Point: Body mechanics (Done)     Learning Progress Summary           Patient Acceptance, E,D, VU,NR by TW at 4/30/2019 10:54 AM    Comment:  Pt education on fall precautions, correct body mechanics with bed mobility and transfers.                   Point: Precautions (Done)     Learning Progress Summary           Patient Acceptance, E,D, VU,NR by TW at 4/30/2019 10:54 AM    Comment:  Pt education on fall precautions, correct body mechanics with bed mobility and transfers.                               User Key     Initials Effective Dates Name Provider Type Discipline     03/07/18 -  Davion Arias, PTA Physical Therapy Assistant PT    TW 03/26/19 -  Xochitl Bradford, PT Physical Therapist PT                PT Recommendation and Plan     Outcome Summary/Treatment Plan (PT)  Daily Summary of Progress (PT): progress toward functional goals is gradual  Plan for Continued Treatment (PT): COnt PT per POC.   Plan of Care Reviewed With:  patient  Progress: no change  Outcome Summary: Pt declined any EOB or OOB activities. Pt was willing to perform bed exercises for which pt did 1x15 reps . See flowsheet for details.   Outcome Measures     Row Name 05/01/19 1336 04/30/19 0934 04/30/19 0932       How much help from another person do you currently need...    Turning from your back to your side while in flat bed without using bedrails?  --  --  3  -TW    Moving from lying on back to sitting on the side of a flat bed without bedrails?  --  --  2  -TW    Moving to and from a bed to a chair (including a wheelchair)?  --  --  1  -TW    Standing up from a chair using your arms (e.g., wheelchair, bedside chair)?  --  --  2  -TW    Climbing 3-5 steps with a railing?  --  --  1  -TW    To walk in hospital room?  --  --  1  -TW    AM-PAC 6 Clicks Score  --  --  10  -TW       How much help from another is currently needed...    Putting on and taking off regular lower body clothing?  2  -SD  2  -AH  --    Bathing (including washing, rinsing, and drying)  2  -SD  2  -AH  --    Toileting (which includes using toilet bed pan or urinal)  1  -SD  1  -AH  --    Putting on and taking off regular upper body clothing  3  -SD  3  -AH  --    Taking care of personal grooming (such as brushing teeth)  3  -SD  3  -AH  --    Eating meals  3  -SD  3  -AH  --    Score  14  -SD  14  -AH  --       Functional Assessment    Outcome Measure Options  AM-PAC 6 Clicks Daily Activity (OT)  -SD  AM-PAC 6 Clicks Daily Activity (OT)  -AH  AM-PAC 6 Clicks Basic Mobility (PT)  -TW      User Key  (r) = Recorded By, (t) = Taken By, (c) = Cosigned By    Initials Name Provider Type    Noemí Fay Occupational Therapist    Hannah Williamson OT Occupational Therapist    Xochitl Barakat PT Physical Therapist         Time Calculation:   PT Charges     Row Name 05/01/19 1630             Time Calculation    Start Time  1416  -RM      Stop Time  1429  -RM      Time Calculation (min)  13 min   -RM      PT Received On  05/01/19  -RM      PT Goal Re-Cert Due Date  05/10/19  -RM         Time Calculation- PT    Total Timed Code Minutes- PT  13 minute(s)  -RM         Timed Charges    26345 - PT Therapeutic Exercise Minutes  13  -RM        User Key  (r) = Recorded By, (t) = Taken By, (c) = Cosigned By    Initials Name Provider Type    Davion Flores, PTA Physical Therapy Assistant        Therapy Charges for Today     Code Description Service Date Service Provider Modifiers Qty    91596861562 HC PT THER PROC EA 15 MIN 5/1/2019 Davion Arias, PTA GP 1          PT G-Codes  Outcome Measure Options: AM-PAC 6 Clicks Daily Activity (OT)  AM-PAC 6 Clicks Score: 10  Score: 14    Davion Arias PTA  5/1/2019

## 2019-05-01 NOTE — PROGRESS NOTES
Continued Stay Note  [facility]     Patient Name: Millicent Mckeon  MRN: [id number]  Today's Date: 5/1/2019    Admit Date: 4/29/2019    Discharge Plan     Row Name 05/01/19 1352       Plan    Plan Comments  Pt may return  to [facility] and  rehab when discharged          Discharge Codes    No documentation.       Expected Discharge Date and Time     Expected Discharge Date Expected Discharge Time    May 2, 2019             Azalia Mcfadden

## 2019-05-01 NOTE — PROGRESS NOTES
"Nephrology Progress Note.    LOS: 0 days    Patient Care Team:  Rosa Zamora MD as PCP - General (Internal Medicine)  Geremias Shepherd MD as Consulting Physician (General Surgery)    Chief Complaint:  No chief complaint on file.      Subjective:     Follow up for FREDDY and Chronic Kidney disease stage 3 / Anemia.   Interval History:   Patient Complaints: none  Patient seen and examined this morning.  Events from last night noted.  Patient denies having any fevers chills.  No nausea or vomiting no abdominal pain.  Denies any chest pain, shortness of breath or cough and sputum production.  She is asking me when she will be able to go home.  History taken from: patient      Objective:    Vital Signs  /83 (BP Location: Left arm, Patient Position: Lying)   Pulse 111   Temp 98.3 °F (36.8 °C) (Oral)   Resp 17   Ht 161 cm (63.39\")   Wt 120 kg (265 lb)   SpO2 99%   BMI 46.37 kg/m²       I/O this shift:  In: 240 [P.O.:240]  Out: 250 [Urine:250]    Intake/Output Summary (Last 24 hours) at 5/1/2019 1006  Last data filed at 5/1/2019 0948  Gross per 24 hour   Intake 2433 ml   Output 250 ml   Net 2183 ml       Physical Exam:  General Appearance: alert, no acute distress,   HEENT: Oral mucosa dry, extra occular movements intact. Sclera clear.  Skin: Warm and dry  Neck: supple, no JVD, trachea midline  Lungs:Chest shape is normal. Breath sounds heard bilaterally equally. No crackles, No wheezing.   Heart: regular rate and rhythm. normal S1 and S2, no S3, no rub, peripheral pulses weak but palpable.  Abdomen: Obese, soft, non-tender,  present bowel sounds to auscultation  : no palpable bladder.  Extremities: 2+ edema, no cyanosis or clubbing.  Most of her edema is in the upper thighs and sacral area.  Neuro: normal speech and mental status, grossly non focal.     Results Review:    Results from last 7 days   Lab Units 05/01/19  0545 04/30/19  0536 04/29/19  2353   SODIUM mmol/L 138 137 137   POTASSIUM mmol/L 5.1 5.0 " 4.9   CHLORIDE mmol/L 110* 108* 106   CO2 mmol/L 18.0* 20.0* 21.0*   BUN mg/dL 82* 96* 90*   CREATININE mg/dL 1.70* 1.80* 1.80*   CALCIUM mg/dL 9.5 9.5 9.6   GLUCOSE mg/dL 170* 192* 177*       Estimated Creatinine Clearance: 44.4 mL/min (A) (by C-G formula based on SCr of 1.7 mg/dL (H)).                Results from last 7 days   Lab Units 05/01/19  0545 04/30/19  0536 04/29/19  1335   WBC 10*3/mm3 5.28 4.98 4.93   HEMOGLOBIN g/dL 9.2* 9.5* 9.9*   PLATELETS 10*3/mm3 158 155 143               Imaging Results (last 24 hours)     ** No results found for the last 24 hours. **          aspirin 81 mg Oral Daily   atorvastatin 10 mg Oral Nightly   bisoprolol 10 mg Oral Q24H   docusate sodium 100 mg Oral BID   ertapenem 1 g Intravenous Q24H   ferrous sulfate 324 mg Oral BID With Meals   gabapentin 300 mg Oral Nightly   hydrALAZINE 10 mg Oral BID   insulin aspart 0-9 Units Subcutaneous 4x Daily AC & at Bedtime   isosorbide mononitrate 15 mg Oral Daily   levothyroxine 150 mcg Oral Q AM   lidocaine 1 patch Transdermal Q24H   multivitamin with minerals 1 tablet Oral Daily   neomycin-bacitracin-polymyxin  Topical Daily   pantoprazole 40 mg Oral Q AM   polyethylene glycol 17 g Oral BID   saccharomyces boulardii 250 mg Oral BID   sodium chloride 3 mL Intravenous Q12H   ascorbic acid 500 mg Oral Daily   zinc sulfate 220 mg Oral BID       sodium chloride 100 mL/hr Last Rate: 100 mL/hr (05/01/19 0641)       Medication Review:   Current Facility-Administered Medications   Medication Dose Route Frequency Provider Last Rate Last Dose   • acetaminophen (TYLENOL) tablet 650 mg  650 mg Oral Q4H PRN Liz Gregg MD       • aspirin EC tablet 81 mg  81 mg Oral Daily Liz Gregg MD   81 mg at 05/01/19 0909   • atorvastatin (LIPITOR) tablet 10 mg  10 mg Oral Nightly Liz Gregg MD   10 mg at 04/30/19 2028   • bisoprolol (ZEBeta) tablet 10 mg  10 mg Oral Q24H Liz Gregg MD   10 mg at 05/01/19 0907   • dextrose  (D50W) 25 g/ 50mL Intravenous Solution 25 g  25 g Intravenous Q15 Min PRN Liz Gregg MD       • dextrose (GLUTOSE) oral gel 1 tube  1 tube Oral Q15 Min PRN Liz Gregg MD       • docusate sodium (COLACE) capsule 100 mg  100 mg Oral BID Liz Gregg MD   100 mg at 05/01/19 0907   • ertapenem (INVanz) 1 g in sodium chloride 0.9 % 100 mL IVPB-MBP  1 g Intravenous Q24H Liz Gregg MD   Stopped at 04/30/19 2110   • ferrous sulfate EC tablet 324 mg  324 mg Oral BID With Meals Liz Gregg MD   324 mg at 05/01/19 0908   • gabapentin (NEURONTIN) capsule 300 mg  300 mg Oral Nightly Liz Gregg MD   300 mg at 04/30/19 2028   • glucagon (human recombinant) (GLUCAGEN DIAGNOSTIC) injection 1 mg  1 mg Subcutaneous PRN Liz Gregg MD       • hydrALAZINE (APRESOLINE) tablet 10 mg  10 mg Oral BID Liz Gregg MD   10 mg at 05/01/19 0909   • insulin aspart (novoLOG) injection 0-9 Units  0-9 Units Subcutaneous 4x Daily AC & at Bedtime Liz Gregg MD   2 Units at 05/01/19 0652   • isosorbide mononitrate (IMDUR) 24 hr tablet 15 mg  15 mg Oral Daily Liz Gregg MD   15 mg at 05/01/19 0908   • levothyroxine (SYNTHROID, LEVOTHROID) tablet 150 mcg  150 mcg Oral Q AM Liz Gregg MD   150 mcg at 05/01/19 0640   • lidocaine (LIDODERM) 5 % 1 patch  1 patch Transdermal Q24H Liz Gregg MD   1 patch at 05/01/19 0907   • multivitamin with minerals 1 tablet  1 tablet Oral Daily Liz Gregg MD   1 tablet at 05/01/19 0907   • neomycin-bacitracin-polymyxin (NEOSPORIN) ointment   Topical Daily Liz Gregg MD   1 application at 05/01/19 0909   • ondansetron (ZOFRAN) injection 4 mg  4 mg Intravenous Q6H PRN Liz Gregg MD       • pantoprazole (PROTONIX) EC tablet 40 mg  40 mg Oral Q AM Liz Gregg MD   40 mg at 05/01/19 0640   • polyethylene glycol (MIRALAX) powder 17 g  17 g Oral BID Liz Gregg MD   17 g  at 04/30/19 2027   • saccharomyces boulardii (FLORASTOR) capsule 250 mg  250 mg Oral BID Liz Gregg MD   250 mg at 05/01/19 0910   • sodium chloride 0.9 % flush 3 mL  3 mL Intravenous Q12H Liz Gregg MD   3 mL at 04/30/19 2028   • sodium chloride 0.9 % flush 3-10 mL  3-10 mL Intravenous PRN Liz Gregg MD       • sodium chloride 0.9 % infusion  100 mL/hr Intravenous Continuous Liz Gregg  mL/hr at 05/01/19 0641 100 mL/hr at 05/01/19 0641   • vitamin C (ASCORBIC ACID) tablet 500 mg  500 mg Oral Daily Liz Gregg MD   500 mg at 05/01/19 0909   • zinc sulfate (ZINCATE) capsule 220 mg  220 mg Oral BID Liz Gregg MD   220 mg at 05/01/19 0909       Assessment/Plan:  1. Acute renal failure (ARF) (CMS/[facility]): It does appear that she has some worsening of her baseline chronic kidney disease, on March 15, 2019 her BUN/creatinine was 23/1.4 with a EGFR of 38 mL/min.  2. Chronic kidney disease stage III: Baseline diabetic hypertensive nephropathy has been doing fairly well with a creatinine of 1.4.  3. Hyperkalemia: It is likely from blood in the GI tract that is causing increase potassium, will continue with the hydration as we can increase the flow through the kidneys potassium should get better with it.  4. GI bleed: Hemoglobin on March 15, 2019 was 11.5, it has gradually drifted to 9.0 on April 29, 2019.  She did mention that she had history of upper GI bleed in the past and had a EGD in the colon done probably about 10 years ago or more.  5. Anemia: I will check the iron stores and see if she is iron deficient as well.  6. Type 2 diabetes with long-term use of insulin: A1c appears to be fairly stable  7. Hypothyroidism: Treated  8. Hypertension: Under fair control.  9. Morbid obesity: Continue counseling  10. History of ESBL Klebsiella infection    Plan:  · Her creatinine clearance came back at 13 cc/h.  That is fairly low.  Her urea clearance is still  pending.  · Continue to optimize medications and see if he can do without dialysis.  · She does appear to have fairly significant amount of fluid on.  We will have to consider diuretics once she is done with the EGD as well as colonoscopy.  · She does have a stool that is positive for blood, scheduled to get an EGD and likely will need a colonoscopy as well.  · Details were discussed with the patient no family in the room.    · Details were also discussed with the hospitalist service.   · Surveillance labs.  · Further recommendations will depend on clinical course of the patient during the current hospitalization.    · I also discussed the details with the nursing staff.  · Rest as ordered.    Milad Leone MD, GILA  05/01/19  10:06 AM    Dictated utilizing Dragon dictation.

## 2019-05-01 NOTE — CONSULTS
General Surgery Consult     Name:Millicent Mckeon  Age: 58 y.o.  Gender: female  : 1960  MRN: [id number]  Visit Number: [id number]  Admit Date: 2019  Date of Service: 19    Patient Care Team:  Rosa Zamora MD as PCP - General (Internal Medicine)  Geremias Shepherd MD as Consulting Physician (General Surgery)    Reason for Consultation: anemia    Chief complaint : anemia      History of Present Illness    Millicent Mckeon is a 58 y.o. female patient with a history of DM, GERD and HTN who was direct admitted on  from [facility] for management of progressive renal failure and hyperkalemia.  The patient was noted to have normocytic anemia with a Hgb 9.9, which seems to be near her baseline.  The patient denies brbpr or melena.  She denies abdominal pain and has been tolerating a diet.  She reports undergoing an EGD and colonoscopy at [facility] by Dr. Griggs sometime last year.  Review of the records on Care Everywhere shows that the patient did, in fact, have and EGD and colonoscopy.  This was done on 10/5/18 with noted reflux esophagitis and a hiatal hernia.  Colonoscopy on the same day showed diverticulosis.  The patient states that she wants to go back to rehab and is not interested in having any invasive procedures.            Past Medical History:   Diagnosis Date   • Anemia 10/2/2018   • Diabetes mellitus (CMS/HCC)    • Disease of thyroid gland    • GERD (gastroesophageal reflux disease)    • Hypertension        Past Surgical History:   Procedure Laterality Date   • EYE SURGERY     • INCISION AND DRAINAGE LEG Right 2019    Procedure: Right heel incision and drainage with graft application;  Surgeon: Ar Rueda DPM;  Location: [facility];  Service: Podiatry   • LEG SURGERY         Family History   Problem Relation Age of Onset   • Asthma Mother    • Hypertension Father    • Stroke Father        Social History     Socioeconomic History   • Marital status: Single     Spouse name:  Not on file   • Number of children: Not on file   • Years of education: Not on file   • Highest education level: Not on file   Tobacco Use   • Smoking status: Never Smoker   • Smokeless tobacco: Never Used   Substance and Sexual Activity   • Alcohol use: No     Frequency: Never   • Drug use: No   • Sexual activity: Defer         Current Facility-Administered Medications:   •  acetaminophen (TYLENOL) tablet 650 mg, 650 mg, Oral, Q4H PRN, Liz Gregg MD  •  aspirin EC tablet 81 mg, 81 mg, Oral, Daily, Liz Gregg MD, 81 mg at 04/30/19 0844  •  atorvastatin (LIPITOR) tablet 10 mg, 10 mg, Oral, Nightly, Liz Gregg MD, 10 mg at 04/30/19 2028  •  bisoprolol (ZEBeta) tablet 10 mg, 10 mg, Oral, Q24H, Liz Gregg MD, 10 mg at 04/30/19 0842  •  dextrose (D50W) 25 g/ 50mL Intravenous Solution 25 g, 25 g, Intravenous, Q15 Min PRN, Liz Gregg MD  •  dextrose (GLUTOSE) oral gel 1 tube, 1 tube, Oral, Q15 Min PRN, Liz Gregg MD  •  docusate sodium (COLACE) capsule 100 mg, 100 mg, Oral, BID, Liz Gregg MD, 100 mg at 04/30/19 2027  •  ertapenem (INVanz) 1 g in sodium chloride 0.9 % 100 mL IVPB-MBP, 1 g, Intravenous, Q24H, Liz Gregg MD, Stopped at 04/30/19 2110  •  ferrous sulfate EC tablet 324 mg, 324 mg, Oral, BID With Meals, Liz Gregg MD, 324 mg at 04/30/19 1738  •  gabapentin (NEURONTIN) capsule 300 mg, 300 mg, Oral, Nightly, Liz Gregg MD, 300 mg at 04/30/19 2028  •  glucagon (human recombinant) (GLUCAGEN DIAGNOSTIC) injection 1 mg, 1 mg, Subcutaneous, PRN, Liz Gregg MD  •  hydrALAZINE (APRESOLINE) tablet 10 mg, 10 mg, Oral, BID, Liz Gregg MD, 10 mg at 04/30/19 2028  •  insulin aspart (novoLOG) injection 0-9 Units, 0-9 Units, Subcutaneous, 4x Daily AC & at Bedtime, Liz Gregg MD, 2 Units at 04/30/19 2101  •  isosorbide mononitrate (IMDUR) 24 hr tablet 15 mg, 15 mg, Oral, Daily, Liz Gregg,  MD, 15 mg at 04/30/19 0844  •  levothyroxine (SYNTHROID, LEVOTHROID) tablet 150 mcg, 150 mcg, Oral, Q AM, Liz Gregg MD, 150 mcg at 04/30/19 0641  •  lidocaine (LIDODERM) 5 % 1 patch, 1 patch, Transdermal, Q24H, Liz Gregg MD, 1 patch at 04/30/19 0842  •  multivitamin with minerals 1 tablet, 1 tablet, Oral, Daily, Liz Gregg MD, 1 tablet at 04/30/19 0844  •  neomycin-bacitracin-polymyxin (NEOSPORIN) ointment, , Topical, Daily, Liz Gregg MD, 1 application at 04/30/19 1530  •  ondansetron (ZOFRAN) injection 4 mg, 4 mg, Intravenous, Q6H PRN, Liz Gregg MD  •  pantoprazole (PROTONIX) EC tablet 40 mg, 40 mg, Oral, Q AM, Liz Gregg MD, 40 mg at 04/30/19 0641  •  polyethylene glycol (MIRALAX) powder 17 g, 17 g, Oral, BID, Liz Gregg MD, 17 g at 04/30/19 2027  •  saccharomyces boulardii (FLORASTOR) capsule 250 mg, 250 mg, Oral, BID, Liz Gregg MD, 250 mg at 04/30/19 2028  •  sodium chloride 0.9 % flush 3 mL, 3 mL, Intravenous, Q12H, Liz Gregg MD, 3 mL at 04/30/19 2028  •  sodium chloride 0.9 % flush 3-10 mL, 3-10 mL, Intravenous, PRN, Liz Gregg MD  •  sodium chloride 0.9 % infusion, 100 mL/hr, Intravenous, Continuous, Liz Gregg MD, Last Rate: 100 mL/hr at 04/30/19 2318, 100 mL/hr at 04/30/19 2318  •  vitamin C (ASCORBIC ACID) tablet 500 mg, 500 mg, Oral, Daily, Lzi Gregg MD, 500 mg at 04/30/19 0843  •  zinc sulfate (ZINCATE) capsule 220 mg, 220 mg, Oral, BID, Liz Gregg MD, 220 mg at 04/30/19 2028    Medications Prior to Admission   Medication Sig Dispense Refill Last Dose   • acetaminophen (TYLENOL) 325 MG tablet Take 650 mg by mouth Every 4 (Four) Hours As Needed for Mild Pain .   4/27/2019   • ammonium lactate (AMLACTIN) 12 % cream Apply 1 application topically to the appropriate area as directed 2 (Two) Times a Day.   4/29/2019   • arginine 500 MG tablet Take 500 mg by mouth Daily.       • aspirin 81 MG chewable tablet Chew 81 mg Daily.      • bisoprolol (ZEBeta) 10 MG tablet Take 20 mg by mouth 2 (Two) Times a Day.   4/29/2019 at 0700   • docusate sodium (COLACE) 100 MG capsule Take 100 mg by mouth 2 (Two) Times a Day.   4/28/2019 at 1000   • ferrous sulfate 324 (65 Fe) MG tablet delayed-release EC tablet Take 324 mg by mouth 2 (Two) Times a Day With Meals.   4/28/2019   • gabapentin (NEURONTIN) 300 MG capsule Take 1 capsule by mouth Every 12 (Twelve) Hours. 20 capsule 0 4/29/2019 at 0800   • hydrALAZINE (APRESOLINE) 10 MG tablet Take 10 mg by mouth 2 (Two) Times a Day.   4/29/2019 at 1000   • insulin aspart (novoLOG) 100 UNIT/ML injection Inject  under the skin into the appropriate area as directed 3 (Three) Times a Day Before Meals. Sliding scale, low dose   4/29/2019 at Unknown time   • isosorbide mononitrate (ISMO,MONOKET) 10 MG tablet Take 15 mg by mouth Daily.   4/29/2019 at 1000   • levoFLOXacin (LEVAQUIN) 500 MG tablet Take 500 mg by mouth Every Other Day. For 5 doses   4/28/2019   • levothyroxine (SYNTHROID, LEVOTHROID) 100 MCG tablet Take 1 tablet by mouth Daily. (Patient taking differently: Take 150 mcg by mouth Daily.)   4/29/2019 at 1000   • levothyroxine (SYNTHROID, LEVOTHROID) 50 MCG tablet Take 50 mcg by mouth Daily.   4/29/2019   • lidocaine (LIDODERM) 5 % Place 1 patch on the skin as directed by provider Daily. Apply patch to left knee daily, on at 0700 am off at 2000   4/29/2019 at 0700   • Multiple Vitamins-Minerals (MULTIVITAMIN WITH MINERALS) tablet tablet Take 1 tablet by mouth Daily.   4/29/2019 at 0800   • Nutritional Supplements (PROTEIN SUPPLEMENT 80% PO) Take 30 mL by mouth 2 (Two) Times a Day.      • pantoprazole (PROTONIX) 40 MG EC tablet Take 40 mg by mouth Daily.   4/29/2019 at 1000   • polyethylene glycol (MIRALAX) packet Take 17 g by mouth 2 (Two) Times a Day.   4/29/2019 at 1000   • RA VITAMIN C 500 MG tablet Take 500 mg by mouth Daily.  0 4/29/2019 at 0800   •  saccharomyces boulardii (FLORASTOR) 250 MG capsule Take 250 mg by mouth 2 (Two) Times a Day.   4/29/2019 at 0700   • simvastatin (ZOCOR) 20 MG tablet Take 20 mg by mouth Every Night.  0 4/28/2019 at 2100   • zinc sulfate (ZINCATE) 220 (50 Zn) MG capsule Take 220 mg by mouth 2 (Two) Times a Day.   4/29/2019 at 0700       Allergies   Allergen Reactions   • Metformin And Related Swelling     HA, diarrhea, throat swelling         Review of Systems   HENT: Negative.    Eyes: Negative.    Respiratory: Negative.    Cardiovascular: Negative.    Gastrointestinal: Negative.    Endocrine: Negative.    Genitourinary: Positive for dysuria.   Musculoskeletal: Negative.    Skin: Negative.    Allergic/Immunologic: Negative.    Neurological: Positive for weakness.   Hematological: Negative.    Psychiatric/Behavioral: Negative.        OBJECTIVE:     Vital Signs  Temp:  [98 °F (36.7 °C)-98.5 °F (36.9 °C)] 98.4 °F (36.9 °C)  Heart Rate:  [101-108] 108  Resp:  [16-19] 16  BP: (121-146)/(80-88) 132/86    No intake/output data recorded.  I/O last 3 completed shifts:  In: 2523 [P.O.:1200; I.V.:1323]  Out: 300 [Urine:300]      Physical Exam   Constitutional: She is oriented to person, place, and time. She appears well-developed and well-nourished. No distress.   HENT:   Head: Normocephalic and atraumatic.   Eyes: EOM are normal. Pupils are equal, round, and reactive to light.   Cardiovascular: Normal rate and regular rhythm.   Pulmonary/Chest: Effort normal. No respiratory distress.   Abdominal: Soft. Bowel sounds are normal. She exhibits no distension. There is no tenderness.   Neurological: She is alert and oriented to person, place, and time.   Skin: Skin is warm and dry.   Psychiatric: She has a normal mood and affect. Her behavior is normal.       Results Review:  I have reviewed the entirety of the patient's clinical lab results.  I have also personally reviewed the patient's imaging      Lab Results (last 72 hours)     Procedure  Component Value Units Date/Time    Specimen:  Stool from Per Rectum Updated:  04/30/19 2151    POC Glucose Once [[id number]]  (Abnormal) Collected:  04/30/19 2009    Specimen:  Blood Updated:  04/30/19 2020     Glucose 180 mg/dL      Comment: Serial Number: [id number]:  [id number]       Creatinine Clearance - [[id number]] Collected:  04/30/19 1647    Specimen:  24 Hour Urine Updated:  04/30/19 1745    Narrative:       The following orders were created for panel order Creatinine Clearance -.  Procedure                               Abnormality         Status                     ---------                               -----------         ------                     Creatinine Clearance, Ur...[[id number]]  Abnormal            Final result                 Please view results for these tests on the individual orders.    Creatinine Clearance, Urine, 24 Hour - [[id number]]  (Abnormal) Collected:  04/30/19 1647    Specimen:  24 Hour Urine Updated:  04/30/19 1745     Creatinine Clearance 9.4 ml/min      Creatinine, Urine 47.3 mg/dL      Time (Hours) 24 hrs      Creatinine, 24H 0.31 g/24 hr      CREATININE CLEARANCE L/24 HOUR 13.5 L/24 hr     Protein, Urine, 24 Hour - Urine, Clean Catch [[id number]]  (Abnormal) Collected:  04/30/19 1646    Specimen:  24 Hour Urine from Urine, Clean Catch Updated:  04/30/19 1744     Protein, 24H Urine 487.5 mg/24hours      Total Protein, Urine 75.0 mg/dL      24H Urine Volume 650 mL      Time (Hours) 24 hrs     POC Glucose Once [[id number]]  (Abnormal) Collected:  04/30/19 1653    Specimen:  Blood Updated:  04/30/19 1700     Glucose 175 mg/dL      Comment: Serial Number: [id number]:  [id number]       Urea Nitrogen 24 Hour Urine - Urine, Clean Catch [[id number]] Collected:  04/30/19 1646    Specimen:  24 Hour Urine from Urine, Clean Catch Updated:  04/30/19 1655    POC Glucose Once [[id number]]  (Abnormal) Collected:  04/30/19 1123    Specimen:  Blood Updated:  04/30/19 1157     Glucose 239  mg/dL      Comment: Serial Number: [id number]:  [id number]       Vitamin B12 [[id number]]  (Normal) Collected:  04/30/19 0536    Specimen:  Blood Updated:  04/30/19 1131     Vitamin B-12 774 pg/mL     Ferritin [[id number]]  (Abnormal) Collected:  04/30/19 0536    Specimen:  Blood Updated:  04/30/19 1124     Ferritin 298.00 ng/mL     Iron Profile [[id number]]  (Normal) Collected:  04/30/19 0536    Specimen:  Blood Updated:  04/30/19 1124     Iron 64 mcg/dL      TIBC 296 mcg/dL      Iron Saturation 22 %     Folate RBC [[id number]] Collected:  04/30/19 1045    Specimen:  Blood Updated:  04/30/19 1047    Reticulocytes [[id number]]  (Abnormal) Collected:  04/30/19 0536    Specimen:  Blood Updated:  04/30/19 1039     Reticulocyte % 2.00 %      Reticulocyte Absolute 0.0748 10*6/mm3     Haptoglobin [[id number]] Collected:  04/30/19 0536    Specimen:  Blood Updated:  04/30/19 1037    POC Glucose Once [[id number]]  (Abnormal) Collected:  04/30/19 0623    Specimen:  Blood Updated:  04/30/19 0631     Glucose 188 mg/dL      Comment: Serial Number: [id number]:  [id number]       Basic Metabolic Panel [[id number]]  (Abnormal) Collected:  04/30/19 0536    Specimen:  Blood Updated:  04/30/19 0623     Glucose 192 mg/dL      BUN 96 mg/dL      Creatinine 1.80 mg/dL      Sodium 137 mmol/L      Potassium 5.0 mmol/L      Chloride 108 mmol/L      CO2 20.0 mmol/L      Calcium 9.5 mg/dL      eGFR  African Amer 35 mL/min/1.73      eGFR Non African Amer 29 mL/min/1.73      BUN/Creatinine Ratio 53.3     Anion Gap 14.0 mmol/L     Narrative:       GFR Normal >60  Chronic Kidney Disease <60  Kidney Failure <15    CBC Auto Differential [[id number]]  (Abnormal) Collected:  04/30/19 0536    Specimen:  Blood Updated:  04/30/19 0620     WBC 4.98 10*3/mm3      RBC 3.72 10*6/mm3      Hemoglobin 9.5 g/dL      Hematocrit 30.7 %      MCV 82.5 fL      MCH 25.5 pg      MCHC 30.9 g/dL      RDW 18.7 %      RDW-SD 56.9 fl      MPV 10.4 fL      Platelets 155  10*3/mm3      Neutrophil % 54.9 %      Lymphocyte % 31.7 %      Monocyte % 9.6 %      Eosinophil % 3.2 %      Basophil % 0.4 %      Immature Grans % 0.2 %      Neutrophils, Absolute 2.73 10*3/mm3      Lymphocytes, Absolute 1.58 10*3/mm3      Monocytes, Absolute 0.48 10*3/mm3      Eosinophils, Absolute 0.16 10*3/mm3      Basophils, Absolute 0.02 10*3/mm3      Immature Grans, Absolute 0.01 10*3/mm3      nRBC 0.0 /100 WBC     Urine Culture - Urine, Urine, Catheter [[id number]]  (Abnormal) Collected:  04/29/19 1632    Specimen:  Urine, Catheter Updated:  04/30/19 0613     Urine Culture >100,000 CFU/mL Gram Negative Bacilli     Comment: Identification and ABHILASH to follow.         Basic Metabolic Panel [[id number]]  (Abnormal) Collected:  04/29/19 2353    Specimen:  Blood Updated:  04/30/19 0028     Glucose 177 mg/dL      BUN 90 mg/dL      Creatinine 1.80 mg/dL      Sodium 137 mmol/L      Potassium 4.9 mmol/L      Chloride 106 mmol/L      CO2 21.0 mmol/L      Calcium 9.6 mg/dL      eGFR  African Amer 35 mL/min/1.73      eGFR Non African Amer 29 mL/min/1.73      BUN/Creatinine Ratio 50.0     Anion Gap 14.9 mmol/L     Narrative:       GFR Normal >60  Chronic Kidney Disease <60  Kidney Failure <15    POC Glucose Once [[id number]]  (Abnormal) Collected:  04/1960    Specimen:  Blood Updated:  04/29/19 2006     Glucose 169 mg/dL      Comment: Serial Number: [id number]:  [id number]       Sodium, Urine, Random - Urine, Clean Catch [[id number]]  (Normal) Collected:  04/29/19 1632    Specimen:  Urine, Clean Catch Updated:  04/29/19 1744     Sodium, Urine 64 mmol/L     Creatinine, Urine, Random - Urine, Clean Catch [[id number]] Collected:  04/29/19 1632    Specimen:  Urine, Clean Catch Updated:  04/29/19 1744     Creatinine, Urine 34.3 mg/dL     Narrative:       Reference intervals for random urine have not been established.  Clinical usage is dependent upon physician's interpretation in combination with other laboratory tests.      POC Glucose Once [[id number]]  (Abnormal) Collected:  04/29/19 1621    Specimen:  Blood Updated:  04/29/19 1730     Glucose 202 mg/dL      Comment: Serial Number: [id number]:  [id number]       Urinalysis, Microscopic Only - Urine, Catheter [[id number]]  (Abnormal) Collected:  04/29/19 1632    Specimen:  Urine, Catheter Updated:  04/29/19 1704     RBC, UA 0-2 /HPF      WBC, UA 6-12 /HPF      Bacteria, UA 3+ /HPF      Squamous Epithelial Cells, UA 3-6 /HPF      Hyaline Casts, UA None Seen /LPF      Methodology Manual Light Microscopy    Urinalysis With Culture If Indicated - Urine, Catheter [[id number]]  (Abnormal) Collected:  04/29/19 1632    Specimen:  Urine, Catheter Updated:  04/29/19 1656     Color, UA Yellow     Appearance, UA Clear     pH, UA 5.5     Specific Gravity, UA 1.015     Glucose, UA Negative     Ketones, UA Negative     Bilirubin, UA Negative     Blood, UA Moderate (2+)     Protein, UA 30 mg/dL (1+)     Leuk Esterase, UA Trace     Nitrite, UA Positive     Urobilinogen, UA 0.2 E.U./dL    Basic Metabolic Panel [[id number]]  (Abnormal) Collected:  04/29/19 1335    Specimen:  Blood Updated:  04/29/19 1407     Glucose 211 mg/dL      BUN 99 mg/dL      Creatinine 1.90 mg/dL      Sodium 138 mmol/L      Potassium 5.4 mmol/L      Chloride 106 mmol/L      CO2 19.0 mmol/L      Calcium 10.1 mg/dL      eGFR  African Amer 33 mL/min/1.73      eGFR Non African Amer 27 mL/min/1.73      BUN/Creatinine Ratio 52.1     Anion Gap 18.4 mmol/L     Narrative:       GFR Normal >60  Chronic Kidney Disease <60  Kidney Failure <15    CBC & Differential [[id number]] Collected:  04/29/19 1335    Specimen:  Blood Updated:  04/29/19 1350    Narrative:       The following orders were created for panel order CBC & Differential.  Procedure                               Abnormality         Status                     ---------                               -----------         ------                     CBC Auto Differential[[id number]]         Abnormal            Final result                 Please view results for these tests on the individual orders.    CBC Auto Differential [[id number]]  (Abnormal) Collected:  04/29/19 1335    Specimen:  Blood Updated:  04/29/19 1350     WBC 4.93 10*3/mm3      RBC 3.78 10*6/mm3      Hemoglobin 9.9 g/dL      Hematocrit 31.5 %      MCV 83.3 fL      MCH 26.2 pg      MCHC 31.4 g/dL      RDW 18.5 %      RDW-SD 56.3 fl      MPV 10.1 fL      Platelets 143 10*3/mm3      Neutrophil % 71.4 %      Lymphocyte % 19.1 %      Monocyte % 6.3 %      Eosinophil % 2.4 %      Basophil % 0.4 %      Immature Grans % 0.4 %      Neutrophils, Absolute 3.52 10*3/mm3      Lymphocytes, Absolute 0.94 10*3/mm3      Monocytes, Absolute 0.31 10*3/mm3      Eosinophils, Absolute 0.12 10*3/mm3      Basophils, Absolute 0.02 10*3/mm3      Immature Grans, Absolute 0.02 10*3/mm3      nRBC 0.0 /100 WBC     Acinetobacter Screen - Swab, Axilla, Left [[id number]] Collected:  04/29/19 1202    Specimen:  Swab from Axilla, Left Updated:  04/29/19 1248    MRSA Screen Culture - Swab, Nares [[id number]] Collected:  04/29/19 1202    Specimen:  Swab from Nares Updated:  04/29/19 1248    VRE Culture - Swab, Per Rectum [[id number]] Collected:  04/29/19 1202    Specimen:  Swab from Per Rectum Updated:  04/29/19 1248    POC Glucose Once [[id number]]  (Abnormal) Collected:  04/29/19 1123    Specimen:  Blood Updated:  04/29/19 1142     Glucose 207 mg/dL      Comment: Serial Number: [id number]:  [id number]                             ASSESSMENT/PLAN:      Acute renal failure (ARF) (CMS/HCC)    Hyperkalemia    Ms. Mckeon is a 57 yo female with multiple medical problems, noted to have normocytic anemia, which seems to be asymptomatic.  She has had a recent EGD and colonoscopy which were unremarkable.  Given her hesitancy to proceed with repeat evaluation, I have requested that we check a fecal occult blood test.  I discussed this with her.  She is agreeable.  I will await  these results and make further recommendations.      Lisa Cardoso MD  05/01/19  12:45 AM

## 2019-05-01 NOTE — THERAPY TREATMENT NOTE
Acute Care - Occupational Therapy Treatment Note   [place]     Patient Name: Millicent Mckeon  : 1960  MRN: [id number]  Today's Date: 2019  Onset of Illness/Injury or Date of Surgery: 19  Date of Referral to OT: 19  Referring Physician: Dr. Gregg    Admit Date: 2019       ICD-10-CM ICD-9-CM   1. Impaired functional mobility and activity tolerance Z74.09 V49.89   2. Impaired mobility and ADLs Z74.09 799.89     Patient Active Problem List   Diagnosis   • Altered mental status   • Bilateral edema of lower extremity   • Cellulitis of lower extremity   • Acute on chronic renal failure (CMS/HCC)   • GERD (gastroesophageal reflux disease)   • Hyperkalemia   • Essential hypertension   • Hypothermia   • Hypothyroidism   • Type 2 diabetes mellitus with complication (CMS/HCC)   • Vitamin B12 deficiency   • Cellulitis of both lower extremities   • Acute metabolic encephalopathy   • Anemia due to stage 4 chronic kidney disease (CMS/HCC)   • Acute renal failure (ARF) (CMS/HCC)   • Hyperkalemia     Past Medical History:   Diagnosis Date   • Anemia 10/2/2018   • Diabetes mellitus (CMS/HCC)    • Disease of thyroid gland    • GERD (gastroesophageal reflux disease)    • Hypertension      Past Surgical History:   Procedure Laterality Date   • EYE SURGERY     • INCISION AND DRAINAGE LEG Right 2019    Procedure: Right heel incision and drainage with graft application;  Surgeon: Ar Rueda DPM;  Location: [facility];  Service: Podiatry   • LEG SURGERY         Therapy Treatment    Rehabilitation Treatment Summary     Row Name 19 1336             Treatment Time/Intention    Discipline  occupational therapist  -SD      Document Type  therapy note (daily note)  -SD      Subjective Information  complains of;pain  -SD      Mode of Treatment  occupational therapy  -SD      Patient/Family Observations  supine in bed  -SD      Care Plan Review  care plan/treatment goals reviewed  -SD       Patient Effort  fair  -SD      Comment  sat EOB - required return to bed d/t L knee pain  -SD      Existing Precautions/Restrictions  fall  -SD      Recorded by [SD] Hannah Santos OT 05/01/19 1549      Row Name 05/01/19 1336             Bed Mobility Assessment/Treatment    Bed Mobility Assessment/Treatment  supine-sit;sit-supine  -SD      Scooting/Bridging [place] (Bed Mobility)  maximum assist (25% patient effort)  -SD      Supine-Sit [place] (Bed Mobility)  maximum assist (25% patient effort)  -SD      Assistive Device (Bed Mobility)  bed rails;draw sheet;head of bed elevated  -SD      Recorded by [SD] Hannah Santos OT 05/01/19 1549      Row Name 05/01/19 1336             Motor Skills Assessment/Interventions    Additional Documentation  Therapeutic Exercise (Group)  -SD      Recorded by [SD] Hannah Santos OT 05/01/19 1549      Row Name 05/01/19 1336             Therapeutic Exercise    Upper Extremity Range of Motion (Therapeutic Exercise)  shoulder flexion/extension, bilateral;shoulder abduction/adduction, bilateral;shoulder horizontal abduction/adduction, bilateral;shoulder internal/external rotation, bilateral;elbow flexion/extension, bilateral  -SD      Weight/Resistance (Therapeutic Exercise)  yellow  -SD      Exercise Type (Therapeutic Exercise)  resistive exercises  -SD      Position (Therapeutic Exercise)  supine  -SD      Sets/Reps (Therapeutic Exercise)  1 x 15  -SD      Equipment (Therapeutic Exercise)  resistive bands  -SD      Expected Outcome (Therapeutic Exercise)  improve functional tolerance, self-care activity  -SD      Recorded by [SD] Hannah Santos OT 05/01/19 1549      Row Name 05/01/19 1336             Positioning and Restraints    Pre-Treatment Position  in bed  -SD      Post Treatment Position  bed  -SD      In Bed  supine;encouraged to call for assist;call light within reach  -SD      Recorded by [SD] Hannah Santos OT 05/01/19 1549      Row Name 05/01/19 1336              Pain Scale: Numbers Pre/Post-Treatment    Pain Scale: Numbers, Pretreatment  4/10  -SD      Pain Scale: Numbers, Post-Treatment  4/10  -SD      Pain Location - Side  Left  -SD      Pain Location  knee  -SD      Pain Intervention(s)  Repositioned  -SD      Recorded by [SD] Hannah Santos OT 05/01/19 1549      Row Name                Wound 04/29/19 1136 Right foot diabetic/neuropathic ulceration    Wound - Properties Group Date first assessed: 04/29/19 [MT] Time first assessed: 1136 [MT] Side: Right [MT] Location: foot [MT] Type: diabetic/neuropathic ulceration [MT] Recorded by:  [MT] Krys Cooper RN 04/29/19 1137    Row Name                Wound 04/29/19 1151 Left upper breast abscess    Wound - Properties Group Date first assessed: 04/29/19 [MT] Time first assessed: 1151 [MT] Side: Left [MT] Orientation: upper [MT] Location: breast [MT2] Type: abscess [MT2] Recorded by:  [MT] Krys Cooper RN 04/29/19 1151 [MT2] Krys Cooper RN 04/29/19 1152    Row Name 05/01/19 1336             Coping    Observed Emotional State  calm;cooperative  -SD      Verbalized Emotional State  acceptance  -SD      Recorded by [SD] Hannah Santos OT 05/01/19 1549      Row Name 05/01/19 1336             Plan of Care Review    Plan of Care Reviewed With  patient  -SD      Recorded by [SD] Hannah Santos OT 05/01/19 1549      Row Name 05/01/19 1336             Outcome Summary/Treatment Plan (OT)    Daily Summary of Progress (OT)  progress toward functional goals is gradual  -SD      Anticipated Discharge Disposition (OT)  inpatient rehabilitation facility  -SD      Recorded by [SD] Hannah Santos OT 05/01/19 1549        User Key  (r) = Recorded By, (t) = Taken By, (c) = Cosigned By    Initials Name Effective Dates Discipline    MT Krys Cooper RN 02/08/17 -  Nurse    Hannah Williamson OT 03/07/18 -  OT        Wound 04/29/19 1136 Right foot diabetic/neuropathic ulceration (Active)   Dressing  Appearance no drainage;dry 5/1/2019 12:00 PM   Closure Open to air 5/1/2019 12:00 PM   Base clean 4/30/2019  6:00 PM   Periwound dry 4/30/2019  6:00 PM   Periwound Temperature warm 4/30/2019  6:00 PM   Periwound Skin Turgor firm 4/30/2019  6:00 PM   Edges irregular 4/30/2019  6:00 PM   Drainage Amount none 4/30/2019  6:00 PM       Wound 04/29/19 1151 Left upper breast abscess (Active)   Dressing Appearance dry;intact 5/1/2019 12:00 PM   Closure VINICIUS 5/1/2019 12:00 PM   Base red;moist 4/30/2019  6:00 PM   Periwound moist 4/30/2019  6:00 PM   Periwound Temperature warm 4/30/2019  6:00 PM   Periwound Skin Turgor soft 4/30/2019  6:00 PM   Edges irregular 4/30/2019  6:00 PM   Drainage Amount none 4/30/2019  6:00 PM     Rehab Goal Summary     Row Name 05/01/19 1336             Bed Mobility Goal 1 (OT)    Progress/Outcomes (Bed Mobility Goal 1, OT)  goal ongoing  -SD         Transfer Goal 1 (OT)    Progress/Outcome (Transfer Goal 1, OT)  goal ongoing  -SD         Dressing Goal 1 (OT)    Progress/Outcome (Dressing Goal 1, OT)  goal ongoing  -SD         Strength Goal 1 (OT)    Progress/Outcome (Strength Goal 1, OT)  goal met  -SD        User Key  (r) = Recorded By, (t) = Taken By, (c) = Cosigned By    Initials Name Provider Type Discipline    Hannah Williamson OT Occupational Therapist OT        Occupational Therapy Education     Title: PT OT SLP Therapies (In Progress)     Topic: Occupational Therapy (In Progress)     Point: ADL training (Done)     Description: Instruct learner(s) on proper safety adaptation and remediation techniques during self care or transfers.   Instruct in proper use of assistive devices.    Learning Progress Summary           Patient Acceptance, E,TB, VU by  at 4/30/2019  1:25 PM    Comment:  Role of OT/POC                   Point: Home exercise program (Done)     Description: Instruct learner(s) on appropriate technique for monitoring, assisting and/or progressing therapeutic  exercises/activities.    Learning Progress Summary           Patient Acceptance, E,TB, VU by SD at 5/1/2019  3:52 PM    Comment:  Benefit of ther ex to improve strength and endurance                               User Key     Initials Effective Dates Name Provider Type Discipline     03/07/18 -  Noemí Redding Occupational Therapist OT    SD 03/07/18 -  Hannah Santos OT Occupational Therapist OT                OT Recommendation and Plan  Outcome Summary/Treatment Plan (OT)  Daily Summary of Progress (OT): progress toward functional goals is gradual  Anticipated Discharge Disposition (OT): inpatient rehabilitation facility  Daily Summary of Progress (OT): progress toward functional goals is gradual  Plan of Care Review  Plan of Care Reviewed With: patient  Plan of Care Reviewed With: patient  Outcome Summary: OT tx completed. patient completed bed mobility with max A, sat EOB with CGA, requested return to dupine d/t L knee pain. Completed B UE ther ex using theraband. Continue OT POC  Outcome Measures     Row Name 05/01/19 1336 04/30/19 0934 04/30/19 0932       How much help from another person do you currently need...    Turning from your back to your side while in flat bed without using bedrails?  --  --  3  -TW    Moving from lying on back to sitting on the side of a flat bed without bedrails?  --  --  2  -TW    Moving to and from a bed to a chair (including a wheelchair)?  --  --  1  -TW    Standing up from a chair using your arms (e.g., wheelchair, bedside chair)?  --  --  2  -TW    Climbing 3-5 steps with a railing?  --  --  1  -TW    To walk in hospital room?  --  --  1  -TW    AM-PAC 6 Clicks Score  --  --  10  -TW       How much help from another is currently needed...    Putting on and taking off regular lower body clothing?  2  -SD  2  -AH  --    Bathing (including washing, rinsing, and drying)  2  -SD  2  -AH  --    Toileting (which includes using toilet bed pan or urinal)  1  -SD  1  -AH  --     Putting on and taking off regular upper body clothing  3  -SD  3  -AH  --    Taking care of personal grooming (such as brushing teeth)  3  -SD  3  -AH  --    Eating meals  3  -SD  3  -AH  --    Score  14  -SD  14  -AH  --       Functional Assessment    Outcome Measure Options  AM-PAC 6 Clicks Daily Activity (OT)  -SD  AM-PAC 6 Clicks Daily Activity (OT)  -AH  AM-PAC 6 Clicks Basic Mobility (PT)  -TW      User Key  (r) = Recorded By, (t) = Taken By, (c) = Cosigned By    Initials Name Provider Type    Noemí Fay Occupational Therapist    Hannah Williamson OT Occupational Therapist    TW Xochitl Bradford, MIC Physical Therapist           Time Calculation:   Time Calculation- OT     Row Name 05/01/19 1552             Time Calculation- OT    OT Start Time  1336  -SD      OT Received On  05/01/19  -SD      OT Goal Re-Cert Due Date  05/10/19  -SD         Timed Charges    81830 - OT Therapeutic Exercise Minutes  13  -SD      91995 - OT Therapeutic Activity Minutes  10  -SD        User Key  (r) = Recorded By, (t) = Taken By, (c) = Cosigned By    Initials Name Provider Type    Hannah Williamson OT Occupational Therapist        Therapy Charges for Today     Code Description Service Date Service Provider Modifiers Qty    78846795841 HC OT THER PROC EA 15 MIN 5/1/2019 Hannah Santos OT GO 1    79499403775 HC OT THERAPEUTIC ACT EA 15 MIN 5/1/2019 Hannah Santos OT GO 1               Hannah Santos OT  5/1/2019

## 2019-05-01 NOTE — PLAN OF CARE
Problem: Fall Risk (Adult)  Goal: Absence of Fall  Outcome: Ongoing (interventions implemented as appropriate)      Problem: Skin Injury Risk (Adult)  Goal: Skin Health and Integrity  Outcome: Ongoing (interventions implemented as appropriate)      Problem: Nutrition, Imbalanced: Excessive Oral Intake (Adult)  Goal: Acknowledges the Importance of Weight Loss/Maintenance and Overall Health  Outcome: Ongoing (interventions implemented as appropriate)    Goal: Expresses Desire and Motivation to Change  Outcome: Ongoing (interventions implemented as appropriate)      Problem: Diabetes, Type 2 (Adult)  Goal: Signs and Symptoms of Listed Potential Problems Will be Absent, Minimized or Managed (Diabetes, Type 2)  Outcome: Ongoing (interventions implemented as appropriate)

## 2019-05-01 NOTE — PLAN OF CARE
Problem: Patient Care Overview  Goal: Plan of Care Review  Outcome: Ongoing (interventions implemented as appropriate)   05/01/19 0442   Coping/Psychosocial   Plan of Care Reviewed With patient   Plan of Care Review   Progress improving   OTHER   Outcome Summary VSS, rested well, no complaints this shift     Goal: Individualization and Mutuality  Outcome: Ongoing (interventions implemented as appropriate)    Goal: Discharge Needs Assessment  Outcome: Ongoing (interventions implemented as appropriate)      Problem: Fall Risk (Adult)  Goal: Absence of Fall  Outcome: Ongoing (interventions implemented as appropriate)      Problem: Skin Injury Risk (Adult)  Goal: Skin Health and Integrity  Outcome: Ongoing (interventions implemented as appropriate)      Problem: Diabetes, Type 2 (Adult)  Goal: Signs and Symptoms of Listed Potential Problems Will be Absent, Minimized or Managed (Diabetes, Type 2)  Outcome: Ongoing (interventions implemented as appropriate)

## 2019-05-02 PROBLEM — Z74.09 IMPAIRED FUNCTIONAL MOBILITY AND ACTIVITY TOLERANCE: Status: ACTIVE | Noted: 2019-05-02

## 2019-05-02 LAB
ACINETOBACTER SCREEN CX: NORMAL
ANION GAP SERPL CALCULATED.3IONS-SCNC: 13.9 MMOL/L (ref 10–20)
BACTERIA SPEC AEROBE CULT: ABNORMAL
BUN BLD-MCNC: 61 MG/DL (ref 7–20)
BUN/CREAT SERPL: 40.7 (ref 7.1–23.5)
CALCIUM SPEC-SCNC: 9.7 MG/DL (ref 8.4–10.2)
CHLORIDE SERPL-SCNC: 111 MMOL/L (ref 98–107)
CO2 SERPL-SCNC: 18 MMOL/L (ref 26–30)
CREAT BLD-MCNC: 1.5 MG/DL (ref 0.6–1.3)
GFR SERPL CREATININE-BSD FRML MDRD: 35 ML/MIN/1.73
GFR SERPL CREATININE-BSD FRML MDRD: 43 ML/MIN/1.73
GLUCOSE BLD-MCNC: 220 MG/DL (ref 74–98)
GLUCOSE BLDC GLUCOMTR-MCNC: 140 MG/DL (ref 70–130)
GLUCOSE BLDC GLUCOMTR-MCNC: 148 MG/DL (ref 70–130)
GLUCOSE BLDC GLUCOMTR-MCNC: 205 MG/DL (ref 70–130)
GLUCOSE BLDC GLUCOMTR-MCNC: 230 MG/DL (ref 70–130)
POTASSIUM BLD-SCNC: 4.9 MMOL/L (ref 3.5–5.1)
SODIUM BLD-SCNC: 138 MMOL/L (ref 137–145)
VRE SPEC QL CULT: NORMAL

## 2019-05-02 PROCEDURE — 63710000001 INSULIN ASPART PER 5 UNITS: Performed by: HOSPITALIST

## 2019-05-02 PROCEDURE — 25010000002 ONDANSETRON PER 1 MG: Performed by: HOSPITALIST

## 2019-05-02 PROCEDURE — 80048 BASIC METABOLIC PNL TOTAL CA: CPT | Performed by: HOSPITALIST

## 2019-05-02 PROCEDURE — 82962 GLUCOSE BLOOD TEST: CPT

## 2019-05-02 PROCEDURE — 99239 HOSP IP/OBS DSCHRG MGMT >30: CPT | Performed by: HOSPITALIST

## 2019-05-02 RX ADMIN — GABAPENTIN 300 MG: 300 CAPSULE ORAL at 20:52

## 2019-05-02 RX ADMIN — ONDANSETRON 4 MG: 2 INJECTION INTRAMUSCULAR; INTRAVENOUS at 06:59

## 2019-05-02 RX ADMIN — SODIUM CHLORIDE, PRESERVATIVE FREE 3 ML: 5 INJECTION INTRAVENOUS at 20:53

## 2019-05-02 RX ADMIN — INSULIN ASPART 4 UNITS: 100 INJECTION, SOLUTION INTRAVENOUS; SUBCUTANEOUS at 20:56

## 2019-05-02 RX ADMIN — Medication 220 MG: at 20:52

## 2019-05-02 RX ADMIN — ATORVASTATIN CALCIUM 10 MG: 10 TABLET, FILM COATED ORAL at 20:52

## 2019-05-02 RX ADMIN — PANTOPRAZOLE SODIUM 40 MG: 40 TABLET, DELAYED RELEASE ORAL at 06:55

## 2019-05-02 RX ADMIN — HYDRALAZINE HYDROCHLORIDE 10 MG: 10 TABLET, FILM COATED ORAL at 09:23

## 2019-05-02 RX ADMIN — Medication 1 APPLICATION: at 09:20

## 2019-05-02 RX ADMIN — HYDRALAZINE HYDROCHLORIDE 10 MG: 10 TABLET, FILM COATED ORAL at 20:52

## 2019-05-02 RX ADMIN — LIDOCAINE 1 PATCH: 50 PATCH CUTANEOUS at 09:20

## 2019-05-02 RX ADMIN — Medication 220 MG: at 09:22

## 2019-05-02 RX ADMIN — SODIUM CHLORIDE 30 ML/HR: 9 INJECTION, SOLUTION INTRAVENOUS at 06:54

## 2019-05-02 RX ADMIN — DOCUSATE SODIUM 100 MG: 100 CAPSULE, LIQUID FILLED ORAL at 09:23

## 2019-05-02 RX ADMIN — ISOSORBIDE MONONITRATE 15 MG: 30 TABLET, EXTENDED RELEASE ORAL at 09:21

## 2019-05-02 RX ADMIN — Medication: at 20:53

## 2019-05-02 RX ADMIN — POLYETHYLENE GLYCOL 3350 17 G: 17 POWDER, FOR SOLUTION ORAL at 20:53

## 2019-05-02 RX ADMIN — FERROUS SULFATE TAB EC 324 MG (65 MG FE EQUIVALENT) 324 MG: 324 (65 FE) TABLET DELAYED RESPONSE at 17:12

## 2019-05-02 RX ADMIN — Medication 250 MG: at 09:23

## 2019-05-02 RX ADMIN — DOCUSATE SODIUM 100 MG: 100 CAPSULE, LIQUID FILLED ORAL at 20:52

## 2019-05-02 RX ADMIN — Medication 1 APPLICATION: at 09:23

## 2019-05-02 RX ADMIN — INSULIN ASPART 4 UNITS: 100 INJECTION, SOLUTION INTRAVENOUS; SUBCUTANEOUS at 12:03

## 2019-05-02 RX ADMIN — SODIUM CHLORIDE, PRESERVATIVE FREE 3 ML: 5 INJECTION INTRAVENOUS at 09:23

## 2019-05-02 RX ADMIN — BISOPROLOL FUMARATE 10 MG: 5 TABLET ORAL at 09:22

## 2019-05-02 RX ADMIN — FERROUS SULFATE TAB EC 324 MG (65 MG FE EQUIVALENT) 324 MG: 324 (65 FE) TABLET DELAYED RESPONSE at 09:22

## 2019-05-02 RX ADMIN — Medication 250 MG: at 20:52

## 2019-05-02 RX ADMIN — ASPIRIN 81 MG: 81 TABLET, COATED ORAL at 09:23

## 2019-05-02 RX ADMIN — LEVOTHYROXINE SODIUM 150 MCG: 150 TABLET ORAL at 06:55

## 2019-05-02 RX ADMIN — OXYCODONE HYDROCHLORIDE AND ACETAMINOPHEN 500 MG: 500 TABLET ORAL at 09:23

## 2019-05-02 RX ADMIN — MULTIPLE VITAMINS W/ MINERALS TAB 1 TABLET: TAB at 09:22

## 2019-05-02 NOTE — PLAN OF CARE
Problem: Patient Care Overview  Goal: Plan of Care Review  Outcome: Ongoing (interventions implemented as appropriate)   05/02/19 0434   Coping/Psychosocial   Plan of Care Reviewed With patient   Plan of Care Review   Progress improving   OTHER   Outcome Summary VSS, denies complaints, pt reluctant to attempt to do much activity     Goal: Individualization and Mutuality  Outcome: Ongoing (interventions implemented as appropriate)    Goal: Discharge Needs Assessment  Outcome: Ongoing (interventions implemented as appropriate)      Problem: Fall Risk (Adult)  Goal: Absence of Fall  Outcome: Ongoing (interventions implemented as appropriate)      Problem: Skin Injury Risk (Adult)  Goal: Skin Health and Integrity  Outcome: Ongoing (interventions implemented as appropriate)      Problem: Diabetes, Type 2 (Adult)  Goal: Signs and Symptoms of Listed Potential Problems Will be Absent, Minimized or Managed (Diabetes, Type 2)  Outcome: Ongoing (interventions implemented as appropriate)

## 2019-05-02 NOTE — PROGRESS NOTES
[facility] [place]    Name: Millicent Mckeon, 58 y.o. female  MRN: [id number], : 1960   Date of Admission: 2019   Date of Service: 19   PCP: Rosa Zamora MD     Hospital Course:   Ms. Millicent Mckeon is a(n) 58 y.o. year old female with a history of diabetes mellitus type 2, hypothyroidism, GERD, HTN who was directly admitted on 2019 from [facility] and rehab to [facility] with progressive renal failure and hyperkalemia.  She has been a resident at [facility] and rehab for the past 2 to 3 months for rehab.  She ambulates with a walker.      She was accepted as a direct admission today by Dr. Leone.  He was contacted by Dr. Zamora from [facility] for progressive renal failure.  Patient had recently been hospitalized there for the same and was discharged to SNF 2 days ago for outpatient follow-up with nephrology.  Clay catheter was removed 2 days ago.  Since that time patient reports dysuria.       Consultants: Dr. Leone; Dr. Cardoso  Procedures:  clay placement  Antibiotics: s/p ertapenem d3   Micro:  UCx: >100K Klebsiella pneumoniae  -------------------------------------------------------------------------------------------------------------------  Subjective   Chief Complaint: f/u UTI and acute renal failure    Subjective:   Patient seen and examined this morning.  Tele reviewed paroxysmal a flutter. Events from last night noted. Discussed with JANAE MELVIN     Review of Systems:   Gen-no fevers, no chills  CV-no chest pain, no palpitations  Resp-no cough, no dyspnea  GI-no N/V/D, no abd pain     Objective   Objective:     Intake/Output Summary (Last 24 hours) at 2019 1631  Last data filed at 2019 1215  Gross per 24 hour   Intake 2065 ml   Output 2600 ml   Net -535 ml      SpO2: 98 %     Physical Examination:   Vitals:    19 0508 19 0811 19 1235 19 1606   BP:  145/98 143/92 127/88   BP Location:   Right arm Right arm Right arm   Patient Position:  Lying Lying Lying   Pulse:  112     Resp:  18 18 18   Temp:  98 °F (36.7 °C) 97.9 °F (36.6 °C) 98.6 °F (37 °C)   TempSrc:  Oral Oral Oral   SpO2:  98% 98% 98%   Weight: 121 kg (266 lb 3 oz)      Height:          General:  Chronically ill middle-aged female, pleasant, morbidly obese, resting in bed, no acute distress  Heart:   RRR, S1 S2 normal, no m/r/g  Lungs:   CTAB, no wheezes  Abdomen:  soft, NT, ND, +BS  Extremities: Lichenification of lower extremities; no edema or cyanosis  Neuro:  A&Ox3, no focal deficits  Psych:  appropriate mood  Skin:  No bleeding, No bruising, No rash    Pertinent laboratory and radiology data were reviewed:  Microbiology Results (last 10 days)     Procedure Component Value - Date/Time    Urine Culture - Urine, Urine, Catheter [[id number]]  (Abnormal)  (Susceptibility) Collected:  04/29/19 1632    Lab Status:  Final result Specimen:  Urine, Catheter Updated:  05/02/19 0726     Urine Culture >100,000 CFU/mL Klebsiella pneumoniae ESBL     Comment: Consider infectious disease consult.  Susceptibility results may not correlate to clinical outcomes.  Cephalosporin resistant Klebsiella, patient may be an isolation risk.       Susceptibility      Klebsiella pneumoniae ESBL     ABHILASH     Amikacin Susceptible     Ampicillin Resistant     Ampicillin + Sulbactam Resistant     Aztreonam Resistant     Cefazolin Resistant     Cefepime Resistant     Cefotaxime Resistant     Cefoxitin Susceptible     Ceftazidime Resistant     Ceftriaxone Resistant     Cefuroxime sodium Resistant     Ciprofloxacin Resistant     Doripenem Susceptible     Ertapenem Susceptible     Gentamicin Susceptible     Levofloxacin Resistant     Nitrofurantoin Susceptible     Piperacillin + Tazobactam Resistant     Tetracycline Susceptible     Tigecycline Susceptible     Tobramycin Susceptible     Trimethoprim + Sulfamethoxazole Resistant                    Acinetobacter Screen - Swab,  Axilla, Left [[id number]]  (Normal) Collected:  04/29/19 1202    Lab Status:  Final result Specimen:  Swab from Axilla, Left Updated:  05/02/19 0604     ACINETOBACTER SCREEN CX No Acinetobacter isolated    VRE Culture - Swab, Per Rectum [[id number]]  (Normal) Collected:  04/29/19 1202    Lab Status:  Final result Specimen:  Swab from Per Rectum Updated:  05/02/19 0659     VRE SCREEN CX No Vancomycin Resistant Enterococcus Isolated          Medications Reviewed:     aspirin 81 mg Oral Daily   atorvastatin 10 mg Oral Nightly   bisoprolol 10 mg Oral Q24H   docusate sodium 100 mg Oral BID   ferrous sulfate 324 mg Oral BID With Meals   gabapentin 300 mg Oral Nightly   hydrALAZINE 10 mg Oral BID   insulin aspart 0-9 Units Subcutaneous 4x Daily AC & at Bedtime   isosorbide mononitrate 15 mg Oral Daily   levothyroxine 150 mcg Oral Q AM   lidocaine 1 patch Transdermal Q24H   multivitamin with minerals 1 tablet Oral Daily   neomycin-bacitracin-polymyxin  Topical Daily   pantoprazole 40 mg Oral Q AM   polyethylene glycol 17 g Oral BID   saccharomyces boulardii 250 mg Oral BID   sodium chloride 3 mL Intravenous Q12H   ascorbic acid 500 mg Oral Daily   zinc sulfate 220 mg Oral BID        Assessment /Plan   Assessment/Plan:   Millicent Mckeon is a(n) 58 y.o. year old female with:      1. Acute on chronic (stage III) renal failure, POA, improved  2. Hyperkalemia, POA, resolved  3. Acute UTI due to ESBL Klebsiella pneumonia, POA, recent Wilkinson catheter placement outside hospital  4. Left inframammary wound, POA  5. Normocytic anemia, hemoglobin at baseline, occult stool positive  6. Diabetes mellitus type 2 with long-term insulin use  7. Hypothyroidism  8. HTN  9. Left knee pain, POA  10. Morbid obesity.  BMI 46.  Complicates all aspects of care.  Weight loss encouraged.  11. History of ESBL Klebsiella infection       Creatinine improved, discussed with Dr. Leone, recommended observing for another 24hours to ensure continued  improvement of renal function and possible discharge tomorrow to rehab if this is the case.    Reviewed Dr. Cardoso's note, will plan for outpatient follow-up with surgery service for EGD if symptomatic anemia, patient declined any inpatient evaluation.    Discussed knee x-ray findings with patient. Discussed with Dr. Joseph, as patient related poor transportation and difficulty getting to outpatient appointments, he advised that patient can be transported from rehab facility to see him in [facility].      Encouraged to sit in chair and continue to work with PT/OT.    CM following, appreciate assistance, patient [facility] when [facility].       F/E/N: IVF; diabetic/renal  DVT PPx: SCDs  GI PPx: not indicated     Reason for continued hospitalization: above  Disposition: possible discharge tomorrow  Discussed with: patient, RN Alta SCHMITZ, Dr. Sulema Gregg MD   4:31 PM on 5/2/2019

## 2019-05-02 NOTE — PROGRESS NOTES
"Nephrology Progress Note.    LOS: 0 days    Patient Care Team:  Rosa Zamora MD as PCP - General (Internal Medicine)  Geremias Shepherd MD as Consulting Physician (General Surgery)    Chief Complaint:  No chief complaint on file.      Subjective:     Follow up for FREDDY and Chronic Kidney disease stage 3 / Anemia.   Interval History:   Patient Complaints: none  Patient seen and examined this morning.  Events from last night noted.  Patient denies having any fevers chills.  No nausea or vomiting no abdominal pain.  Denies any chest pain, shortness of breath or cough and sputum production.  There is no significant edema.   Patient also denies having new onset weakness of numbness of either extremity.  She wants to go home.  She said her EGD and colon will be scheduled as an outpatient.  Surgical notes reviewed.  History taken from: patient  Review of Systems:    The pertinent  ROS was done and it is noted above, rest  was negative.    Objective:    Vital Signs  /98 (BP Location: Right arm, Patient Position: Lying)   Pulse 112   Temp 98 °F (36.7 °C) (Oral)   Resp 18   Ht 161 cm (63.39\")   Wt 121 kg (266 lb 3 oz)   SpO2 98%   BMI 46.58 kg/m²       No intake/output data recorded.    Intake/Output Summary (Last 24 hours) at 5/2/2019 0844  Last data filed at 5/2/2019 0700  Gross per 24 hour   Intake 2305 ml   Output 2950 ml   Net -645 ml       Physical Exam:    General Appearance: alert, oriented x 3, no acute distress,   HEENT: Oral mucosa dry, extra occular movements intact. Sclera clear.  Skin: Warm and dry  Neck: supple, no JVD, trachea midline  Lungs:Chest shape is normal. Breath sounds heard bilaterally equally. No crackles, No wheezing.   Heart: regular rate and rhythm. normal S1 and S2, no S3, no rub, peripheral pulses weak but palpable.  Abdomen: Obese, soft, non-tender,  present bowel sounds to auscultation  : no palpable bladder.  Extremities: Trace to 1+ edema, no cyanosis or clubbing.   Neuro: " normal speech and mental status, grossly non focal.     Results Review:    Results from last 7 days   Lab Units 05/01/19  0545 04/30/19  0536 04/29/19  2353   SODIUM mmol/L 138 137 137   POTASSIUM mmol/L 5.1 5.0 4.9   CHLORIDE mmol/L 110* 108* 106   CO2 mmol/L 18.0* 20.0* 21.0*   BUN mg/dL 82* 96* 90*   CREATININE mg/dL 1.70* 1.80* 1.80*   CALCIUM mg/dL 9.5 9.5 9.6   GLUCOSE mg/dL 170* 192* 177*       Estimated Creatinine Clearance: 44.7 mL/min (A) (by C-G formula based on SCr of 1.7 mg/dL (H)).                Results from last 7 days   Lab Units 05/01/19  0545 04/30/19  0536 04/29/19  1335   WBC 10*3/mm3 5.28 4.98 4.93   HEMOGLOBIN g/dL 9.2* 9.5* 9.9*   PLATELETS 10*3/mm3 158 155 143               Imaging Results (last 24 hours)     Procedure Component Value Units Date/Time    XR Knee 3 View Left [[id number]] Collected:  05/02/19 0800     Updated:  05/02/19 0802    Narrative:       PROCEDURE: XR KNEE 3 VW LEFT-     History: knee pain; Z74.09-Other reduced mobility; Z74.09-Other reduced  mobility     COMPARISON: None.     FINDINGS:  A 3 view exam demonstrates no acute fracture or dislocation.  There are advanced tricompartmental degenerative changes with joint  space narrowing and osteophyte formation consistent with osteoarthritis.  No soft tissue abnormality is seen.       Impression:       Degenerative change with no acute osseous abnormalities.               This report was finalized on 5/2/2019 8:00 AM by Lety Valiente M.D..          aspirin 81 mg Oral Daily   atorvastatin 10 mg Oral Nightly   bisoprolol 10 mg Oral Q24H   docusate sodium 100 mg Oral BID   ferrous sulfate 324 mg Oral BID With Meals   gabapentin 300 mg Oral Nightly   hydrALAZINE 10 mg Oral BID   insulin aspart 0-9 Units Subcutaneous 4x Daily AC & at Bedtime   isosorbide mononitrate 15 mg Oral Daily   levothyroxine 150 mcg Oral Q AM   lidocaine 1 patch Transdermal Q24H   multivitamin with minerals 1 tablet Oral Daily    neomycin-bacitracin-polymyxin  Topical Daily   pantoprazole 40 mg Oral Q AM   polyethylene glycol 17 g Oral BID   saccharomyces boulardii 250 mg Oral BID   sodium chloride 3 mL Intravenous Q12H   ascorbic acid 500 mg Oral Daily   zinc sulfate 220 mg Oral BID       sodium chloride 30 mL/hr Last Rate: 30 mL/hr (05/02/19 0654)       Medication Review:   Current Facility-Administered Medications   Medication Dose Route Frequency Provider Last Rate Last Dose   • acetaminophen (TYLENOL) tablet 650 mg  650 mg Oral Q4H PRN Liz Gregg MD       • ammonium lactate (LAC-HYDRIN) 12 % lotion   Topical PRN Liz Gregg MD       • aspirin EC tablet 81 mg  81 mg Oral Daily Liz Gregg MD   81 mg at 05/01/19 0909   • atorvastatin (LIPITOR) tablet 10 mg  10 mg Oral Nightly Liz Gregg MD   10 mg at 05/01/19 2042   • bisoprolol (ZEBeta) tablet 10 mg  10 mg Oral Q24H Liz Gregg MD   10 mg at 05/01/19 0907   • dextrose (D50W) 25 g/ 50mL Intravenous Solution 25 g  25 g Intravenous Q15 Min PRN Liz Gregg MD       • dextrose (GLUTOSE) oral gel 1 tube  1 tube Oral Q15 Min PRN Liz Gregg MD       • docusate sodium (COLACE) capsule 100 mg  100 mg Oral BID Liz Gregg MD   100 mg at 05/01/19 2042   • ferrous sulfate EC tablet 324 mg  324 mg Oral BID With Meals Liz Gregg MD   324 mg at 05/01/19 1715   • gabapentin (NEURONTIN) capsule 300 mg  300 mg Oral Nightly Liz Gregg MD   300 mg at 05/01/19 2042   • glucagon (human recombinant) (GLUCAGEN DIAGNOSTIC) injection 1 mg  1 mg Subcutaneous PRN Liz Gregg MD       • hydrALAZINE (APRESOLINE) tablet 10 mg  10 mg Oral BID Liz Gregg MD   10 mg at 05/01/19 2042   • insulin aspart (novoLOG) injection 0-9 Units  0-9 Units Subcutaneous 4x Daily AC & at Bedtime Liz Gregg MD   2 Units at 05/01/19 2058   • isosorbide mononitrate (IMDUR) 24 hr tablet 15 mg  15 mg Oral Daily  Liz Gregg MD   15 mg at 05/01/19 0908   • levothyroxine (SYNTHROID, LEVOTHROID) tablet 150 mcg  150 mcg Oral Q AM Liz Gregg MD   150 mcg at 05/02/19 0655   • lidocaine (LIDODERM) 5 % 1 patch  1 patch Transdermal Q24H Liz Gregg MD   1 patch at 05/01/19 0907   • multivitamin with minerals 1 tablet  1 tablet Oral Daily Liz Gregg MD   1 tablet at 05/01/19 0907   • neomycin-bacitracin-polymyxin (NEOSPORIN) ointment   Topical Daily Liz Gregg MD   1 application at 05/01/19 0909   • ondansetron (ZOFRAN) injection 4 mg  4 mg Intravenous Q6H PRN Liz Gregg MD   4 mg at 05/02/19 0659   • pantoprazole (PROTONIX) EC tablet 40 mg  40 mg Oral Q AM Liz Gregg MD   40 mg at 05/02/19 0655   • polyethylene glycol (MIRALAX) powder 17 g  17 g Oral BID Liz Gregg MD   17 g at 04/30/19 2027   • saccharomyces boulardii (FLORASTOR) capsule 250 mg  250 mg Oral BID Liz Gregg MD   250 mg at 05/01/19 2042   • sodium chloride 0.9 % flush 3 mL  3 mL Intravenous Q12H Liz Gregg MD   3 mL at 05/01/19 2042   • sodium chloride 0.9 % flush 3-10 mL  3-10 mL Intravenous PRN Liz Gregg MD       • sodium chloride 0.9 % infusion  30 mL/hr Intravenous Continuous Milad Leone MD, FASN 30 mL/hr at 05/02/19 0654 30 mL/hr at 05/02/19 0654   • vitamin C (ASCORBIC ACID) tablet 500 mg  500 mg Oral Daily Liz Gregg MD   500 mg at 05/01/19 0909   • zinc sulfate (ZINCATE) capsule 220 mg  220 mg Oral BID Liz Gregg MD   220 mg at 05/01/19 2042       Assessment/Plan:    1. Acute renal failure (ARF) (CMS/HCC): It does appear that she has some worsening of her baseline chronic kidney disease, on March 15, 2019 her BUN/creatinine was 23/1.4 with a EGFR of 38 mL/min.  2. Chronic kidney disease stage III: Baseline diabetic hypertensive nephropathy has been doing fairly well with a creatinine of 1.4.  3. Hyperkalemia: It is likely from  blood in the GI tract that is causing increase potassium, will continue with the hydration as we can increase the flow through the kidneys potassium should get better with it.  4. GI bleed: Hemoglobin on March 15, 2019 was 11.5, it has gradually drifted to 9.0 on April 29, 2019.  She did mention that she had history of upper GI bleed in the past and had a EGD in the colon done probably about 10 years ago or more.  5. Anemia: I will check the iron stores and see if she is iron deficient as well.  6. Type 2 diabetes with long-term use of insulin: A1c appears to be fairly stable  7. Hypothyroidism: Treated  8. Hypertension: Under fair control.  9. Morbid obesity: Continue counseling  10. History of ESBL Klebsiella infection      Plan:  · 24-hour urine creatinine clearance as well as urea clearance noted her average clearance comes out to be about 6 mL/min.  Details about dialysis discussed with the patient at this point she is fine if needed she will get it done.  · I think she may be an acute kidney injury and that might be the reason the renal function appears to be so low.  I will continue to watch for another 24 hours if renal function continues to improve, may consider discharging her back to the nursing home and do another 24-hour urine collection next week at the nursing home.  If it is persistently low she will need dialysis.  · Continue with the current treatment plan.  · EGD and colon will be scheduled as an outpatient.  · Details were discussed with the patient no family in the room.    · Details were also discussed with the hospitalist service.   · Surveillance labs.  · Further recommendations will depend on clinical course of the patient during the current hospitalization.    · I also discussed the details with the nursing staff.  · Rest as ordered.    Milad Leone MD, FASN  05/02/19  8:44 AM    Dictated utilizing Dragon dictation.

## 2019-05-02 NOTE — PROGRESS NOTES
Discharge Planning Assessment  [facility]     Patient Name: Millicent Mckeon  MRN: [id number]  Today's Date: 5/2/2019    Admit Date: 4/29/2019    Discharge Needs Assessment    No documentation.       Discharge Plan     Row Name 05/02/19 1408       Plan    Plan Comments  [facility] would not be [facility] today possibly tomorrow        Destination      No service coordination in this encounter.      Durable Medical Equipment      No service coordination in this encounter.      Dialysis/Infusion      No service coordination in this encounter.      Home Medical Care      No service coordination in this encounter.      Therapy      No service coordination in this encounter.      Community Resources      No service coordination in this encounter.        Expected Discharge Date and Time     Expected Discharge Date Expected Discharge Time    May 3, 2019         Demographic Summary    No documentation.       Functional Status    No documentation.       Psychosocial    No documentation.       Abuse/Neglect    No documentation.       Legal    No documentation.       Substance Abuse    No documentation.       Patient Forms    No documentation.           Azalia Armendariz RN

## 2019-05-02 NOTE — SIGNIFICANT NOTE
05/02/19 1320   Rehab Treatment   Discipline occupational therapist   Reason Treatment Not Performed patient/family declined treatment

## 2019-05-02 NOTE — SIGNIFICANT NOTE
"   05/02/19 1317   Rehab Treatment   Discipline physical therapy assistant   Reason Treatment Not Performed patient/family declined treatment  (Pt declined tx and reported,\"It hurts and I want to go home.\" Therapy will f/u with pt tomorrow. )     "

## 2019-05-03 VITALS
DIASTOLIC BLOOD PRESSURE: 88 MMHG | OXYGEN SATURATION: 99 % | WEIGHT: 272.5 LBS | HEART RATE: 112 BPM | SYSTOLIC BLOOD PRESSURE: 122 MMHG | TEMPERATURE: 97.9 F | BODY MASS INDEX: 48.28 KG/M2 | RESPIRATION RATE: 16 BRPM | HEIGHT: 63 IN

## 2019-05-03 LAB
ANION GAP SERPL CALCULATED.3IONS-SCNC: 15.1 MMOL/L (ref 10–20)
BUN BLD-MCNC: 53 MG/DL (ref 7–20)
BUN/CREAT SERPL: 35.3 (ref 7.1–23.5)
CALCIUM SPEC-SCNC: 9.7 MG/DL (ref 8.4–10.2)
CHLORIDE SERPL-SCNC: 111 MMOL/L (ref 98–107)
CO2 SERPL-SCNC: 19 MMOL/L (ref 26–30)
CREAT BLD-MCNC: 1.5 MG/DL (ref 0.6–1.3)
GFR SERPL CREATININE-BSD FRML MDRD: 35 ML/MIN/1.73
GFR SERPL CREATININE-BSD FRML MDRD: 43 ML/MIN/1.73
GLUCOSE BLD-MCNC: 142 MG/DL (ref 74–98)
GLUCOSE BLDC GLUCOMTR-MCNC: 149 MG/DL (ref 70–130)
GLUCOSE BLDC GLUCOMTR-MCNC: 207 MG/DL (ref 70–130)
MRSA SPEC QL CULT: NORMAL
POTASSIUM BLD-SCNC: 5.1 MMOL/L (ref 3.5–5.1)
SODIUM BLD-SCNC: 140 MMOL/L (ref 137–145)

## 2019-05-03 PROCEDURE — 80048 BASIC METABOLIC PNL TOTAL CA: CPT | Performed by: HOSPITALIST

## 2019-05-03 PROCEDURE — 63710000001 INSULIN ASPART PER 5 UNITS: Performed by: HOSPITALIST

## 2019-05-03 PROCEDURE — 82962 GLUCOSE BLOOD TEST: CPT

## 2019-05-03 RX ORDER — LEVOTHYROXINE SODIUM 0.15 MG/1
150 TABLET ORAL DAILY
Qty: 30 TABLET | Refills: 0
Start: 2019-05-03 | End: 2022-04-14

## 2019-05-03 RX ORDER — METOPROLOL SUCCINATE 50 MG/1
50 TABLET, EXTENDED RELEASE ORAL DAILY
Qty: 30 TABLET | Refills: 0 | Status: SHIPPED | OUTPATIENT
Start: 2019-05-03 | End: 2022-04-14

## 2019-05-03 RX ORDER — IBUPROFEN 200 MG
TABLET ORAL DAILY
Qty: 3.5 G | Refills: 0
Start: 2019-05-03 | End: 2019-05-05

## 2019-05-03 RX ORDER — GABAPENTIN 300 MG/1
300 CAPSULE ORAL NIGHTLY
Qty: 8 CAPSULE | Refills: 0 | Status: SHIPPED | OUTPATIENT
Start: 2019-05-03 | End: 2019-07-17

## 2019-05-03 RX ORDER — ASPIRIN 81 MG/1
81 TABLET ORAL DAILY
Qty: 30 TABLET | Refills: 0 | Status: SHIPPED | OUTPATIENT
Start: 2019-05-03 | End: 2019-06-02

## 2019-05-03 RX ORDER — BISOPROLOL FUMARATE 10 MG/1
10 TABLET, FILM COATED ORAL
Qty: 30 TABLET | Refills: 0
Start: 2019-05-03 | End: 2019-05-03 | Stop reason: HOSPADM

## 2019-05-03 RX ADMIN — INSULIN ASPART 4 UNITS: 100 INJECTION, SOLUTION INTRAVENOUS; SUBCUTANEOUS at 12:20

## 2019-05-03 RX ADMIN — FERROUS SULFATE TAB EC 324 MG (65 MG FE EQUIVALENT) 324 MG: 324 (65 FE) TABLET DELAYED RESPONSE at 09:51

## 2019-05-03 RX ADMIN — Medication 1 APPLICATION: at 09:52

## 2019-05-03 RX ADMIN — ISOSORBIDE MONONITRATE 15 MG: 30 TABLET, EXTENDED RELEASE ORAL at 09:51

## 2019-05-03 RX ADMIN — Medication 250 MG: at 09:52

## 2019-05-03 RX ADMIN — Medication 220 MG: at 09:51

## 2019-05-03 RX ADMIN — Medication 1 APPLICATION: at 09:51

## 2019-05-03 RX ADMIN — SODIUM CHLORIDE, PRESERVATIVE FREE 3 ML: 5 INJECTION INTRAVENOUS at 09:53

## 2019-05-03 RX ADMIN — PANTOPRAZOLE SODIUM 40 MG: 40 TABLET, DELAYED RELEASE ORAL at 06:39

## 2019-05-03 RX ADMIN — BISOPROLOL FUMARATE 10 MG: 5 TABLET ORAL at 09:51

## 2019-05-03 RX ADMIN — LIDOCAINE 1 PATCH: 50 PATCH CUTANEOUS at 09:52

## 2019-05-03 RX ADMIN — HYDRALAZINE HYDROCHLORIDE 10 MG: 10 TABLET, FILM COATED ORAL at 09:51

## 2019-05-03 RX ADMIN — OXYCODONE HYDROCHLORIDE AND ACETAMINOPHEN 500 MG: 500 TABLET ORAL at 09:52

## 2019-05-03 RX ADMIN — MULTIPLE VITAMINS W/ MINERALS TAB 1 TABLET: TAB at 09:51

## 2019-05-03 RX ADMIN — LEVOTHYROXINE SODIUM 150 MCG: 150 TABLET ORAL at 06:40

## 2019-05-03 RX ADMIN — ASPIRIN 81 MG: 81 TABLET, COATED ORAL at 09:51

## 2019-05-03 NOTE — PROGRESS NOTES
"Nephrology Progress Note.    LOS: 1 day    Patient Care Team:  Rosa Zamora MD as PCP - General (Internal Medicine)  Geremias Shepherd MD as Consulting Physician (General Surgery)    Chief Complaint:  No chief complaint on file.      Subjective:     Follow up for FREDDY and Chronic Kidney disease stage 3 / Anemia.   Interval History:   Patient Complaints: none  Patient seen and examined this morning.  Events from last night noted.  Patient denies having any fevers chills.  No nausea or vomiting no abdominal pain.  Denies any chest pain, shortness of breath or cough and sputum production.  There is no significant edema.   Patient also denies having new onset weakness of numbness of either extremity.  She wants to go home.  She is willing to follow-up as an outpatient.  History taken from: patient  Review of Systems:    The pertinent  ROS was done and it is noted above, rest  was negative.    Objective:    Vital Signs  /87 (BP Location: Left arm, Patient Position: Lying)   Pulse 112   Temp 98.1 °F (36.7 °C) (Oral)   Resp 16   Ht 161 cm (63.39\")   Wt 124 kg (272 lb 8 oz)   SpO2 97%   BMI 47.69 kg/m²       No intake/output data recorded.    Intake/Output Summary (Last 24 hours) at 5/3/2019 0837  Last data filed at 5/2/2019 1713  Gross per 24 hour   Intake 306 ml   Output 600 ml   Net -294 ml       Physical Exam:    General Appearance: alert, oriented x 3, no acute distress,   HEENT: Oral mucosa dry, extra occular movements intact. Sclera clear.  Skin: Warm and dry  Neck: supple, no JVD, trachea midline  Lungs:Chest shape is normal. Breath sounds heard bilaterally equally. No crackles, No wheezing.   Heart: regular rate and rhythm. normal S1 and S2, no S3, no rub, peripheral pulses weak but palpable.  Abdomen: Obese, soft, non-tender,  present bowel sounds to auscultation  : no palpable bladder.  Extremities: Trace to 1+ edema, no cyanosis or clubbing.   Neuro: normal speech and mental status, grossly non " focal.     Results Review:    Results from last 7 days   Lab Units 05/03/19  0555 05/02/19  0920 05/01/19  0545   SODIUM mmol/L 140 138 138   POTASSIUM mmol/L 5.1 4.9 5.1   CHLORIDE mmol/L 111* 111* 110*   CO2 mmol/L 19.0* 18.0* 18.0*   BUN mg/dL 53* 61* 82*   CREATININE mg/dL 1.50* 1.50* 1.70*   CALCIUM mg/dL 9.7 9.7 9.5   GLUCOSE mg/dL 142* 220* 170*       Estimated Creatinine Clearance: 51.4 mL/min (A) (by C-G formula based on SCr of 1.5 mg/dL (H)).                Results from last 7 days   Lab Units 05/01/19  0545 04/30/19  0536 04/29/19  1335   WBC 10*3/mm3 5.28 4.98 4.93   HEMOGLOBIN g/dL 9.2* 9.5* 9.9*   PLATELETS 10*3/mm3 158 155 143               Imaging Results (last 24 hours)     ** No results found for the last 24 hours. **          aspirin 81 mg Oral Daily   atorvastatin 10 mg Oral Nightly   bisoprolol 10 mg Oral Q24H   docusate sodium 100 mg Oral BID   ferrous sulfate 324 mg Oral BID With Meals   gabapentin 300 mg Oral Nightly   hydrALAZINE 10 mg Oral BID   insulin aspart 0-9 Units Subcutaneous 4x Daily AC & at Bedtime   isosorbide mononitrate 15 mg Oral Daily   levothyroxine 150 mcg Oral Q AM   lidocaine 1 patch Transdermal Q24H   multivitamin with minerals 1 tablet Oral Daily   neomycin-bacitracin-polymyxin  Topical Daily   pantoprazole 40 mg Oral Q AM   polyethylene glycol 17 g Oral BID   saccharomyces boulardii 250 mg Oral BID   sodium chloride 3 mL Intravenous Q12H   ascorbic acid 500 mg Oral Daily   zinc sulfate 220 mg Oral BID       sodium chloride 30 mL/hr Last Rate: 30 mL/hr (05/02/19 0654)       Medication Review:   Current Facility-Administered Medications   Medication Dose Route Frequency Provider Last Rate Last Dose   • acetaminophen (TYLENOL) tablet 650 mg  650 mg Oral Q4H PRN Lzi Gregg MD       • ammonium lactate (LAC-HYDRIN) 12 % lotion   Topical PRN Liz Gregg MD       • aspirin EC tablet 81 mg  81 mg Oral Daily Liz Gregg MD   81 mg at 05/02/19 0923   •  atorvastatin (LIPITOR) tablet 10 mg  10 mg Oral Nightly Liz Gregg MD   10 mg at 05/02/19 2052   • bisoprolol (ZEBeta) tablet 10 mg  10 mg Oral Q24H Liz Gregg MD   10 mg at 05/02/19 0922   • dextrose (D50W) 25 g/ 50mL Intravenous Solution 25 g  25 g Intravenous Q15 Min PRN Liz Gregg MD       • dextrose (GLUTOSE) oral gel 1 tube  1 tube Oral Q15 Min PRN Liz Gregg MD       • docusate sodium (COLACE) capsule 100 mg  100 mg Oral BID Liz Gregg MD   100 mg at 05/02/19 2052   • ferrous sulfate EC tablet 324 mg  324 mg Oral BID With Meals Liz Gregg MD   324 mg at 05/02/19 1712   • gabapentin (NEURONTIN) capsule 300 mg  300 mg Oral Nightly Liz Gregg MD   300 mg at 05/02/19 2052   • glucagon (human recombinant) (GLUCAGEN DIAGNOSTIC) injection 1 mg  1 mg Subcutaneous PRN Liz Gregg MD       • hydrALAZINE (APRESOLINE) tablet 10 mg  10 mg Oral BID Liz Gregg MD   10 mg at 05/02/19 2052   • insulin aspart (novoLOG) injection 0-9 Units  0-9 Units Subcutaneous 4x Daily AC & at Bedtime Liz Gregg MD   4 Units at 05/02/19 2056   • isosorbide mononitrate (IMDUR) 24 hr tablet 15 mg  15 mg Oral Daily Liz Gregg MD   15 mg at 05/02/19 0921   • levothyroxine (SYNTHROID, LEVOTHROID) tablet 150 mcg  150 mcg Oral Q AM Liz Gregg MD   150 mcg at 05/03/19 0640   • lidocaine (LIDODERM) 5 % 1 patch  1 patch Transdermal Q24H Liz Gregg MD   1 patch at 05/02/19 0920   • multivitamin with minerals 1 tablet  1 tablet Oral Daily Liz Gregg MD   1 tablet at 05/02/19 0922   • neomycin-bacitracin-polymyxin (NEOSPORIN) ointment   Topical Daily Liz Gregg MD   1 application at 05/02/19 0942   • ondansetron (ZOFRAN) injection 4 mg  4 mg Intravenous Q6H PRN Liz Gregg MD   4 mg at 05/02/19 0659   • pantoprazole (PROTONIX) EC tablet 40 mg  40 mg Oral Q AM Liz Gregg MD   40 mg at  05/03/19 0639   • polyethylene glycol (MIRALAX) powder 17 g  17 g Oral BID Liz Gregg MD   17 g at 05/02/19 2053   • saccharomyces boulardii (FLORASTOR) capsule 250 mg  250 mg Oral BID Liz Gregg MD   250 mg at 05/02/19 2052   • sodium chloride 0.9 % flush 3 mL  3 mL Intravenous Q12H Liz Gregg MD   3 mL at 05/02/19 2053   • sodium chloride 0.9 % flush 3-10 mL  3-10 mL Intravenous PRN Liz Gregg MD       • sodium chloride 0.9 % infusion  30 mL/hr Intravenous Continuous Milad Leone MD, FASN 30 mL/hr at 05/02/19 0654 30 mL/hr at 05/02/19 0654   • vitamin C (ASCORBIC ACID) tablet 500 mg  500 mg Oral Daily Liz Gregg MD   500 mg at 05/02/19 0923   • zinc sulfate (ZINCATE) capsule 220 mg  220 mg Oral BID Liz Gregg MD   220 mg at 05/02/19 2052       Assessment/Plan:    1. Acute renal failure (ARF) (CMS/[facility]): It does appear that she has some worsening of her baseline chronic kidney disease, on March 15, 2019 her BUN/creatinine was 23/1.4 with a EGFR of 38 mL/min.  2. Chronic kidney disease stage III: Baseline diabetic hypertensive nephropathy has been doing fairly well with a creatinine of 1.4.  3. Hyperkalemia: It is likely from blood in the GI tract that is causing increase potassium, will continue with the hydration as we can increase the flow through the kidneys potassium should get better with it.  4. GI bleed: Hemoglobin on March 15, 2019 was 11.5, it has gradually drifted to 9.0 on April 29, 2019.  She did mention that she had history of upper GI bleed in the past and had a EGD in the colon done probably about 10 years ago or more.  5. Anemia: I will check the iron stores and see if she is iron deficient as well.  6. Type 2 diabetes with long-term use of insulin: A1c appears to be fairly stable  7. Hypothyroidism: Treated  8. Hypertension: Under fair control.  9. Morbid obesity: Continue counseling  10. History of ESBL Klebsiella  infection      Plan:  · 24-hour urine creatinine clearance as well as urea clearance noted her average clearance comes out to be about 6 mL/min.  Details about dialysis discussed with the patient at this point she is fine if needed she will get it done.  Her renal function has improved and she is asymptomatic, it appears likely all this was done during an acute process when she was having an ATN.  · I think she may be an acute kidney injury and that might be the reason the renal function appears to be so low.  I will continue to watch for another 24 hours if renal function continues to improve, may consider discharging her back to the nursing home and do another 24-hour urine collection next week or 2 at the nursing home.  If it is persistently low she will need dialysis.  · Continue with the current treatment plan.  · EGD and colon will be scheduled as an outpatient, if needed.  · Details were discussed with the patient no family in the room.    · Details were also discussed with the hospitalist service.  Follow-up with me in 2 weeks.  · Surveillance labs.  · Further recommendations will depend on clinical course of the patient during the current hospitalization.    · I also discussed the details with the nursing staff.  · Rest as ordered.    Milad Leone MD, FASN  05/03/19  8:37 AM    Dictated utilizing Dragon dictation.

## 2019-05-03 NOTE — DISCHARGE PLACEMENT REQUEST
"BUZZ Hoyos RN  Case Management  Phone:  [phone]; Fax:  [phone]    Discharge summary attached.    Tatiana Mckeon (58 y.o. Female)     Date of Birth Social Security Number Address Home Phone MRN    1960  [address] [place] [address] [phone] [id number]    Yarsani Marital Status          Unknown Single       Admission Date Admission Type Admitting Provider Attending Provider Department, Room/Bed    4/29/19 Urgent Liz Gregg MD Manivannan, Suganya, MD [facility] SURG  4, 424/1    Discharge Date Discharge Disposition Discharge Destination         Skilled Nursing Facility (DC - External)              Attending Provider:  Liz Gregg MD    Allergies:  Metformin And Related    Isolation:  Contact   Infection:  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)   Code Status:  CPR    Ht:  161 cm (63.39\")   Wt:  124 kg (272 lb 8 oz)    Admission Cmt:  None   Principal Problem:  None                Active Insurance as of 4/29/2019     Primary Coverage     Payor Plan Insurance Group Employer/Plan Group    MEDICARE MEDICARE A & B      Payor Plan Address Payor Plan Phone Number Payor Plan Fax Number Effective Dates    [address] [phone]  12/1/1998 - None Entered    [facility] [address]       Subscriber Name Subscriber Birth Date Member ID       TATIANA MCKEON 1960 [id number]           Secondary Coverage     Payor Plan Insurance Group Employer/Plan Group    [place]      Payor Plan Address Payor Plan Phone Number Payor Plan Fax Number Effective Dates    [address] [phone]  10/3/2018 - None Entered    [facility] [address]       Subscriber Name Subscriber Birth Date Member ID       TATIANA MCKEON 1960 [id number]                 Emergency Contacts      (Rel.) Home Phone Work Phone Mobile Phone    Lovely Mckeon (Power of ) [phone] -- --    SUNDAY CHACON (Relative) [phone] -- [phone]    " Adolfo Mckeon (Brother) [phone] -- --               Discharge Summary      Liz Gregg MD at 5/3/2019  9:23 AM          [facility] Hospital[facility] - Patient Discharge Summary    Patient Name: Millicent Mckeon YOB: 1960 MRN: [id number]   Admit Date: 4/29/2019   Discharge Date: 5/3/2019   Date of Service: 5/3/2019   Admitting Physician: Liz Gregg MD   Attending Physician: Liz Gregg MD   Primary Care Physician: Rosa Zamora MD     Admitting Diagnosis:   Acute renal failure (ARF) (CMS/[facility]) [N17.9]  Impaired functional mobility and activity tolerance [Z74.09]     Discharge Diagnosis:   1. Acute on chronic (stage III) renal failure, POA, improved  2. Hyperkalemia, POA, resolved  3. Acute UTI due to ESBL Klebsiella pneumonia, POA, recent Wilkinson catheter placement at [facility]  4. Left inframammary wound, POA  5. Normocytic anemia, hemoglobin at baseline, occult stool positive  6. Diabetes mellitus type 2 with long-term insulin use  7. Hypothyroidism  8. HTN  9. Left knee pain, POA, referred to Dr. Joseph  10. Morbid obesity.  BMI 46.  Complicates all aspects of care.  Weight loss encouraged.  11. History of ESBL Klebsiella infection        Consults:   Consults     Date and Time Order Name Status Description    4/30/2019 1043 Inpatient General Surgery Consult Completed     4/29/2019 1424 Inpatient Nephrology Consult Completed         Consulting Physician(s)     Provider Relationship Specialty    Milad Leone MD, [facility] Consulting Physician Nephrology          Procedures/Interventions/Operations:   None    Hospital Course:   Ms. Millicent Mckeon is a(n) 58 y.o. year old female with a history of diabetes mellitus type 2, hypothyroidism, GERD, HTN who was directly admitted on 4/29/2019 from [facility] to [facility] with progressive renal failure and hyperkalemia.  She has been a resident at [facility] for the past 2 to 3 months for  rehab.  She ambulates with a walker. Dr. Leone with nephrology was consulted and has been following patient. 24hour urine protein and creatinine clearance obtained and demonstrated CrCl of 13L/24hr. 24hour urine urea clearance also obtained, this was 5g/24hr. Creatinine however started to improve and on day of discharge is 1.5. Discussed with Dr. Leone, recommends repeat 24hour urine protein, creatinine, urea in 1 week at rehab facility.  Patient to follow-up with Dr. Leone in 1 week. Hyperkalemia resolved with medical therapies.    She also endorsed dysuria on admission. She had clay catheter that was recently placed and removed at [facility] 2 days prior to admission to [facility].  UA and cultures obtained. She was started on ertapenem, given h/o ESBL infection. She completed antibiotic therapy for UTI. Urine cultures resulted with ESBL K.pneumoniae. She does not have any further dysuria.     During hospitalization, patient was noted to have a low hemoglobin.  Occult stool was positive.  Dr. Cardoso with general surgery was consulted and has seen patient.  Patient was hemodynamically stable and did not have any further blood loss or change in hemoglobin, she also declined inpatient endoscopic procedure.  She is recommended to follow-up with general surgery as outpatient if further issues with anemia.    PT and OT were consulted during hospitalization and have been working with patient. She is not able to bear weight on left leg due to knee pain. Xrays were obtained, consistent wit DJD of left knee.  I discussed with Dr. Joseph and he recommended patient follow-up in his clinic at [facility] for further evaluation. Will continue supportive care with analgesics in interim.    Patient is medically ready for discharge to [facility] today. She is tolerating PO and voiding independently. She denies any chest pain or pressure, fevers or chills, abdominal pain or diarrhea, or dysuria on day of discharge.    Discharge Follow  Up Recommendations for labs/diagnostics:  Follow-up with PCP at nursing home in 1 week  Will need 24hour urine for protein, creatinine, and urea in 1 week  Follow-up with Dr. Leone in 1 week  Follow-up with Dr. Joseph as per discharge instructions     Discharge Examination:   Vital Signs:  Temp:  [97.6 °F (36.4 °C)-98.6 °F (37 °C)] 98.1 °F (36.7 °C)  Heart Rate:  [112-114] 112  Resp:  [16-18] 16  BP: (127-152)/() 136/87    General Appearance:  Obese middle aged female; pleasant; Alert and cooperative, not in any acute distress.   Head:  Atraumatic and normocephalic, without obvious abnormality.   Eyes:          PERRL, conjunctivae and sclerae normal, no Icterus. No pallor. Extraocular movements are within normal limits.   Ears:  Ears appear intact with no abnormalities noted.   Throat: No oral lesions, no thrush, oral mucosa moist.   Lungs:   Chest shape is normal. Breath sounds heard bilaterally equally.  No crackles or wheezing. No Pleural rub or bronchial breathing.   Heart:  Normal S1 and S2, no murmur, no gallop, no rub   Abdomen:   Obese abdomen; Normal bowel sounds, no masses. Soft, non-tender, non-distended, no guarding, no rebound tenderness.   Extremities: Moves all extremities well, no edema, no cyanosis, no clubbing.   Pulses: Pulses palpable and equal bilaterally.   Skin: No bleeding, bruising or rash.   Lymph nodes: No palpable adenopathy.   Neurologic: Alert and oriented x 3. Moves all four limbs equally. No tremors. No facial asymmetry.     Discharge Condition:   good     Discharge Disposition:   [facility]    Code status during the hospital stay:  Code Status and Medical Interventions:   Ordered at: 04/29/19 1721     Level Of Support Discussed With:    Patient     Code Status:    CPR     Medical Interventions (Level of Support Prior to Arrest):    Full       Discharge Instructions:   Diet:  diabetic/renal  Activity: as tolerated   Patient education performed: discussed new medications,  follow-up  Pending labs and studies: none  Physician instructions:   1. follow-up with PCP in 1 week  2. Follow-up with Dr. Leone in 1 week  3. Follow-[facility] with Dr. Joseph at [facility]  4. please seek prompt medical attention if persistent fevers, chest pain, increased shortness of breath, or any other worrisome symptoms  5. Do not drive, swim, operate heavy equipment or perform any activity that may endanger yourself or others while taking pain medication     Followup Appointments:   Follow-up Information     Rosa Zamora MD Follow up in 1 week(s).    Specialty:  Internal Medicine  Contact information:  [address] [facility] [id number] [phone]             [facility]    Specialties:  Nephrology, Hospitalist  Contact information:  [address] [place]  [facility] [address] [phone]             Kenneth Joseph MD Follow up.    Specialty:  Orthopedic Surgery  Why:  please make next available appointment for patient at [facility] with Dr. Joseph  Contact information:  [address] [facility]  [address]  [facility] [address] [phone]                    Anticipated Problems: none     Discharge Medications:      Discharge Medications      ASK your doctor about these medications      Instructions Start Date   acetaminophen 325 MG tablet  Commonly known as:  TYLENOL   650 mg, Oral, Every 4 Hours PRN      ammonium lactate 12 % cream  Commonly known as:  AMLACTIN   1 application, Topical, 2 Times Daily      arginine 500 MG tablet   500 mg, Oral, Daily      aspirin 81 MG chewable tablet   81 mg, Oral, Daily      bisoprolol 10 MG tablet  Commonly known as:  ZEBeta   20 mg, Oral, 2 Times Daily      docusate sodium 100 MG capsule  Commonly known as:  COLACE   100 mg, Oral, 2 Times Daily      ferrous sulfate 324 (65 Fe) MG tablet delayed-release EC tablet   324 mg, Oral, 2 Times Daily With Meals      gabapentin 300 MG capsule  Commonly known as:  NEURONTIN   300 mg,  Oral, Every 12 Hours Scheduled      hydrALAZINE 10 MG tablet  Commonly known as:  APRESOLINE   10 mg, Oral, 2 Times Daily      insulin aspart 100 UNIT/ML injection  Commonly known as:  novoLOG   Subcutaneous, 3 Times Daily Before Meals, Sliding scale, low dose       isosorbide mononitrate 10 MG tablet  Commonly known as:  ISMO,MONOKET   15 mg, Oral, Daily      levoFLOXacin 500 MG tablet  Commonly known as:  LEVAQUIN   500 mg, Oral, Every Other Day, For 5 doses       levothyroxine 50 MCG tablet  Commonly known as:  SYNTHROID, LEVOTHROID   50 mcg, Oral, Daily      levothyroxine 100 MCG tablet  Commonly known as:  SYNTHROID, LEVOTHROID   100 mcg, Oral, Daily      lidocaine 5 %  Commonly known as:  LIDODERM   1 patch, Transdermal, Every 24 Hours, Apply patch to left knee daily, on at 0700 am off at 2000       multivitamin with minerals tablet tablet   1 tablet, Oral, Daily      pantoprazole 40 MG EC tablet  Commonly known as:  PROTONIX   40 mg, Oral, Daily      polyethylene glycol packet  Commonly known as:  MIRALAX   17 g, Oral, 2 Times Daily      PROTEIN SUPPLEMENT 80% PO   30 mL, Oral, 2 Times Daily      RA VITAMIN C 500 MG tablet  Generic drug:  ascorbic acid   500 mg, Oral, Daily      saccharomyces boulardii 250 MG capsule  Commonly known as:  FLORASTOR   250 mg, Oral, 2 Times Daily      simvastatin 20 MG tablet  Commonly known as:  ZOCOR   20 mg, Oral, Nightly      zinc sulfate 220 (50 Zn) MG capsule  Commonly known as:  ZINCATE   220 mg, Oral, 2 Times Daily              Pertinent Data/Imaging:   Lab Results   Component Value Date    WBC 5.28 05/01/2019    HGB 9.2 (L) 05/01/2019    HCT 29.6 (L) 05/01/2019    MCV 82.9 05/01/2019     05/01/2019      Lab Results   Component Value Date    CREATININE 1.50 (H) 05/03/2019    BUN 53 (H) 05/03/2019     05/03/2019    K 5.1 05/03/2019     (H) 05/03/2019    CO2 19.0 (L) 05/03/2019      Lab Results   Component Value Date    INR 1.41 (H) 01/13/2019    INR  1.57 (H) 01/10/2019    PROTIME 15.6 (H) 01/13/2019    PROTIME 17.4 (H) 01/10/2019        Imaging Results (all)     Procedure Component Value Units Date/Time    XR Knee 3 View Left [[id number]] Collected:  05/02/19 0800     Updated:  05/02/19 0802    Narrative:       PROCEDURE: XR KNEE 3 VW LEFT-     History: knee pain; Z74.09-Other reduced mobility; Z74.09-Other reduced  mobility     COMPARISON: None.     FINDINGS:  A 3 view exam demonstrates no acute fracture or dislocation.  There are advanced tricompartmental degenerative changes with joint  space narrowing and osteophyte formation consistent with osteoarthritis.  No soft tissue abnormality is seen.       Impression:       Degenerative change with no acute osseous abnormalities.               This report was finalized on 5/2/2019 8:00 AM by Lety Valiente M.D..            Time Spent on Discharge: 35 min which included; examination and discussion with patient, primary nurse Micheal. Review of medical record, review of medications, printing of scripts, ensuring adequate followup, along with documentation.     Electronically signed by Liz Gregg MD, 05/03/19, 9:24 AM.      Electronically signed by Liz Gregg MD at 5/3/2019  9:47 AM

## 2019-05-03 NOTE — DISCHARGE SUMMARY
[facility] - Hospitalist - Patient Discharge Summary    Patient Name: Millicent Mckeon YOB: 1960 MRN: [id number]   Admit Date: 4/29/2019   Discharge Date: 5/3/2019   Date of Service: 5/3/2019   Admitting Physician: Liz Gregg MD   Attending Physician: Liz Gregg MD   Primary Care Physician: Rosa Zamora MD     Admitting Diagnosis:   Acute renal failure (ARF) (CMS/[facility]) [N17.9]  Impaired functional mobility and activity tolerance [Z74.09]     Discharge Diagnosis:   1. Acute on chronic (stage III) renal failure, POA, improved  2. Hyperkalemia, POA, resolved  3. Acute UTI due to ESBL Klebsiella pneumonia, POA, recent Wilkinson catheter placement at [facility]  4. Left inframammary wound, POA  5. Normocytic anemia, hemoglobin at baseline, occult stool positive  6. Diabetes mellitus type 2 with long-term insulin use  7. Hypothyroidism  8. HTN  9. Left knee pain, POA, referred to Dr. Joseph  10. Morbid obesity.  BMI 46.  Complicates all aspects of care.  Weight loss encouraged.  11. History of ESBL Klebsiella infection        Consults:   Consults     Date and Time Order Name Status Description    4/30/2019 1043 Inpatient General Surgery Consult Completed     4/29/2019 1424 Inpatient Nephrology Consult Completed         Consulting Physician(s)     Provider Relationship Specialty    Milad Leone MD, GILA Consulting Physician Nephrology          Procedures/Interventions/Operations:   None    Hospital Course:   Ms. Millicent Mckeon is a(n) 58 y.o. year old female with a history of diabetes mellitus type 2, hypothyroidism, GERD, HTN who was directly admitted on 4/29/2019 from [facility] to [facility] with progressive renal failure and hyperkalemia.  She has been a resident at [facility] for the past 2 to 3 months for rehab.  She ambulates with a walker. Dr. Leone with nephrology was consulted and has been following patient. 24hour urine protein and  creatinine clearance obtained and demonstrated CrCl of 13L/24hr. 24hour urine urea clearance also obtained, this was 5g/24hr. Creatinine however started to improve and on day of discharge is 1.5. Discussed with Dr. Leone, recommends repeat 24hour urine protein, creatinine, urea in 1 week at rehab facility.  Patient to follow-up with Dr. Leone in 2 weeks. Hyperkalemia resolved with medical therapies.    She also endorsed dysuria on admission. She had clay catheter that was recently placed and removed at [facility] 2 days prior to admission to [facility].  UA and cultures obtained. She was started on ertapenem, given h/o ESBL infection. She completed antibiotic therapy for UTI. Urine cultures resulted with ESBL K.pneumoniae. She does not have any further dysuria.     During hospitalization, patient was noted to have a low hemoglobin.  Occult stool was positive.  Dr. Cardoso with general surgery was consulted and has seen patient.  Patient was hemodynamically stable and did not have any further blood loss or change in hemoglobin, she also declined inpatient endoscopic procedure.  She is recommended to follow-up with general surgery as outpatient if further issues with anemia.    PT and OT were consulted during hospitalization and have been working with patient. She is not able to bear weight on left leg due to knee pain. Xrays were obtained, consistent wit DJD of left knee.  I discussed with Dr. Joseph and he recommended patient follow-up in his clinic at [facility] for further evaluation. Will continue supportive care with analgesics in interim.    Patient is medically ready for discharge to [facility] today. She is tolerating PO and voiding independently. She denies any chest pain or pressure, fevers or chills, abdominal pain or diarrhea, or dysuria on day of discharge.    Discharge Follow Up Recommendations for labs/diagnostics:  Follow-up with PCP at nursing home in 1 week  Will need 24hour urine for protein, creatinine,  and urea in 1 week  Follow-up with Dr. Leone in 2 weeks  Follow-up with Dr. Joseph as per discharge instructions     Discharge Examination:   Vital Signs:  Temp:  [97.6 °F (36.4 °C)-98.6 °F (37 °C)] 98.1 °F (36.7 °C)  Heart Rate:  [112-114] 112  Resp:  [16-18] 16  BP: (127-152)/() 136/87    General Appearance:  Obese middle aged female; pleasant; Alert and cooperative, not in any acute distress.   Head:  Atraumatic and normocephalic, without obvious abnormality.   Eyes:          PERRL, conjunctivae and sclerae normal, no Icterus. No pallor. Extraocular movements are within normal limits.   Ears:  Ears appear intact with no abnormalities noted.   Throat: No oral lesions, no thrush, oral mucosa moist.   Lungs:   Chest shape is normal. Breath sounds heard bilaterally equally.  No crackles or wheezing. No Pleural rub or bronchial breathing.   Heart:  Normal S1 and S2, no murmur, no gallop, no rub   Abdomen:   Obese abdomen; Normal bowel sounds, no masses. Soft, non-tender, non-distended, no guarding, no rebound tenderness.   Extremities: Moves all extremities well, no edema, no cyanosis, no clubbing.   Pulses: Pulses palpable and equal bilaterally.   Skin: No bleeding, bruising or rash.   Lymph nodes: No palpable adenopathy.   Neurologic: Alert and oriented x 3. Moves all four limbs equally. No tremors. No facial asymmetry.     Discharge Condition:   good     Discharge Disposition:   [facility]    Code status during the hospital stay:  Code Status and Medical Interventions:   Ordered at: 04/29/19 1721     Level Of Support Discussed With:    Patient     Code Status:    CPR     Medical Interventions (Level of Support Prior to Arrest):    Full       Discharge Instructions:   Diet:  diabetic/renal  Activity: as tolerated   Patient education performed: discussed new medications, follow-up  Pending labs and studies: none  Physician instructions:   1. follow-up with PCP in 1 week  2. Follow-up with Dr. Leone in 2  weeks  3. Follow-up with Dr. [facility] at [facility]  4. please seek prompt medical attention if persistent fevers, chest pain, increased shortness of breath, or any other worrisome symptoms  5. Do not drive, swim, operate heavy equipment or perform any activity that may endanger yourself or others while taking pain medication     Followup Appointments:    Contact information for follow-up providers     Rosa Zamora MD Follow up in 1 week(s).    Specialty:  Internal Medicine  Contact information:  [address] [facility] [address] [phone]             Milad Leone MD, GILA Follow up in 1 week(s).    Specialties:  Nephrology, Hospitalist  Contact information:  [address] [place]  [facility] [address] [phone]             Kenneth Joseph MD Follow up.    Specialty:  Orthopedic Surgery  Why:  please make next available appointment for patient at [facility] clinic with Dr. Joseph  Contact information:  [address]  [facility] [address] [phone]                   Contact information for after-discharge care     Destination     [facility] .    Service:  Skilled Nursing  Contact information:  [address] [address] [phone]                              Anticipated Problems: none     Discharge Medications:      Discharge Medications      New Medications      Instructions Start Date   metoprolol succinate XL 50 MG 24 hr tablet  Commonly known as:  TOPROL-XL   50 mg, Oral, Daily      neomycin-bacitracin-polymyxin 5-400-5000 ointment   Topical, Daily         Changes to Medications      Instructions Start Date   aspirin 81 MG EC tablet  What changed:  Another medication with the same name was removed. Continue taking this medication, and follow the directions you see here.   81 mg, Oral, Daily      gabapentin 300 MG capsule  Commonly known as:  NEURONTIN  What changed:  when to take this   300 mg, Oral, Nightly      levothyroxine 150 MCG  tablet  Commonly known as:  SYNTHROID, LEVOTHROID  What changed:    · medication strength  · how much to take  · Another medication with the same name was removed. Continue taking this medication, and follow the directions you see here.   150 mcg, Oral, Daily         Continue These Medications      Instructions Start Date   acetaminophen 325 MG tablet  Commonly known as:  TYLENOL   650 mg, Oral, Every 4 Hours PRN      ammonium lactate 12 % cream  Commonly known as:  AMLACTIN   1 application, Topical, 2 Times Daily      arginine 500 MG tablet   500 mg, Oral, Daily      docusate sodium 100 MG capsule  Commonly known as:  COLACE   100 mg, Oral, 2 Times Daily      ferrous sulfate 324 (65 Fe) MG tablet delayed-release EC tablet   324 mg, Oral, 2 Times Daily With Meals      hydrALAZINE 10 MG tablet  Commonly known as:  APRESOLINE   10 mg, Oral, 2 Times Daily      insulin aspart 100 UNIT/ML injection  Commonly known as:  novoLOG   Subcutaneous, 3 Times Daily Before Meals, Sliding scale, low dose       isosorbide mononitrate 10 MG tablet  Commonly known as:  ISMO,MONOKET   15 mg, Oral, Daily      lidocaine 5 %  Commonly known as:  LIDODERM   1 patch, Transdermal, Every 24 Hours, Apply patch to left knee daily, on at 0700 am off at 2000       multivitamin with minerals tablet tablet   1 tablet, Oral, Daily      pantoprazole 40 MG EC tablet  Commonly known as:  PROTONIX   40 mg, Oral, Daily      polyethylene glycol packet  Commonly known as:  MIRALAX   17 g, Oral, 2 Times Daily      PROTEIN SUPPLEMENT 80% PO   30 mL, Oral, 2 Times Daily      RA VITAMIN C 500 MG tablet  Generic drug:  ascorbic acid   500 mg, Oral, Daily      saccharomyces boulardii 250 MG capsule  Commonly known as:  FLORASTOR   250 mg, Oral, 2 Times Daily      simvastatin 20 MG tablet  Commonly known as:  ZOCOR   20 mg, Oral, Nightly      zinc sulfate 220 (50 Zn) MG capsule  Commonly known as:  ZINCATE   220 mg, Oral, 2 Times Daily         Stop These  Medications    bisoprolol 10 MG tablet  Commonly known as:  ZEBeta     levoFLOXacin 500 MG tablet  Commonly known as:  LEVAQUIN             Pertinent Data/Imaging:   Lab Results   Component Value Date    WBC 5.28 05/01/2019    HGB 9.2 (L) 05/01/2019    HCT 29.6 (L) 05/01/2019    MCV 82.9 05/01/2019     05/01/2019      Lab Results   Component Value Date    CREATININE 1.50 (H) 05/03/2019    BUN 53 (H) 05/03/2019     05/03/2019    K 5.1 05/03/2019     (H) 05/03/2019    CO2 19.0 (L) 05/03/2019      Lab Results   Component Value Date    INR 1.41 (H) 01/13/2019    INR 1.57 (H) 01/10/2019    PROTIME 15.6 (H) 01/13/2019    PROTIME 17.4 (H) 01/10/2019        Imaging Results (all)     Procedure Component Value Units Date/Time    XR Knee 3 View Left [[id number]] Collected:  05/02/19 0800     Updated:  05/02/19 0802    Narrative:       PROCEDURE: XR KNEE 3 VW LEFT-     History: knee pain; Z74.09-Other reduced mobility; Z74.09-Other reduced  mobility     COMPARISON: None.     FINDINGS:  A 3 view exam demonstrates no acute fracture or dislocation.  There are advanced tricompartmental degenerative changes with joint  space narrowing and osteophyte formation consistent with osteoarthritis.  No soft tissue abnormality is seen.       Impression:       Degenerative change with no acute osseous abnormalities.               This report was finalized on 5/2/2019 8:00 AM by Lety Valiente M.D..            Time Spent on Discharge: 35 min which included; examination and discussion with patient, primary nurse Micheal. Review of medical record, review of medications, printing of scripts, ensuring adequate followup, along with documentation.     Electronically signed by Liz Gregg MD, 05/03/19, 9:24 AM.

## 2019-05-03 NOTE — DISCHARGE INSTRUCTIONS
1. follow-up with PCP in 1 week  2. Follow-up with Dr. Leone in 2 weeks  3. Follow-up with Dr. Joseph at [facility]  4. please seek prompt medical attention if persistent fevers, chest pain, increased shortness of breath, or any other worrisome symptoms  5. Do not drive, swim, operate heavy equipment or perform any activity that may endanger yourself or others while taking pain medication

## 2019-05-03 NOTE — PROGRESS NOTES
Continued Stay Note   [facility]     Patient Name: Millicent Mckeon  MRN: [id number]  Today's Date: 5/3/2019    Admit Date: 4/29/2019    Discharge Plan     Row Name 05/03/19 1046       Plan    Plan  [facility]    Patient/Family in Agreement with Plan  yes    Plan Comments Spoke to Carla with [facility] to accept pt today.  Report number is [phone].  Discharge summary has been efaxed to [phone].  Pt will need an ambulance for transport.  JANAE Burton and clerk Manuela updated.        Discharge Codes    No documentation.       Expected Discharge Date and Time     Expected Discharge Date Expected Discharge Time    May 3, 2019             Azalia Cooper

## 2019-05-03 NOTE — PLAN OF CARE
Problem: Patient Care Overview  Goal: Plan of Care Review  Outcome: Ongoing (interventions implemented as appropriate)   05/02/19 0434 05/03/19 0334   Coping/Psychosocial   Plan of Care Reviewed With --  patient   Plan of Care Review   Progress --  improving   OTHER   Outcome Summary VSS, denies complaints, pt reluctant to attempt to do much activity --      Goal: Individualization and Mutuality  Outcome: Ongoing (interventions implemented as appropriate)    Goal: Discharge Needs Assessment  Outcome: Ongoing (interventions implemented as appropriate)      Problem: Fall Risk (Adult)  Goal: Absence of Fall  Outcome: Ongoing (interventions implemented as appropriate)      Problem: Skin Injury Risk (Adult)  Goal: Skin Health and Integrity  Outcome: Ongoing (interventions implemented as appropriate)      Problem: Diabetes, Type 2 (Adult)  Goal: Signs and Symptoms of Listed Potential Problems Will be Absent, Minimized or Managed (Diabetes, Type 2)  Outcome: Ongoing (interventions implemented as appropriate)

## 2019-05-09 ENCOUNTER — HOSPITAL ENCOUNTER (OUTPATIENT)
Facility: HOSPITAL | Age: 59
Discharge: HOME | End: 2019-05-09
Payer: MEDICARE

## 2019-05-09 LAB
AMORPHOUS: ABNORMAL /HPF
BACTERIA: ABNORMAL /HPF
BILIRUBIN URINE: NEGATIVE
BLOOD, URINE: ABNORMAL
CLARITY: CLEAR
COLOR: YELLOW
GLUCOSE URINE: NEGATIVE MG/DL
KETONES, URINE: NEGATIVE MG/DL
LEUKOCYTE ESTERASE, URINE: ABNORMAL
MICROSCOPIC EXAMINATION: YES
NITRITE, URINE: NEGATIVE
PH UA: 5 (ref 5–8)
PROTEIN UA: 100 MG/DL
RBC UA: ABNORMAL /HPF (ref 0–2)
SPECIFIC GRAVITY UA: 1.01 (ref 1–1.03)
URINE REFLEX TO CULTURE: YES
URINE TYPE: ABNORMAL
UROBILINOGEN, URINE: 0.2 E.U./DL
WBC UA: ABNORMAL /HPF (ref 0–5)
YEAST: PRESENT /HPF

## 2019-05-09 PROCEDURE — 81001 URINALYSIS AUTO W/SCOPE: CPT

## 2019-05-09 PROCEDURE — 87086 URINE CULTURE/COLONY COUNT: CPT

## 2019-05-10 ENCOUNTER — HOSPITAL ENCOUNTER (OUTPATIENT)
Facility: HOSPITAL | Age: 59
Discharge: HOME | End: 2019-05-10
Payer: COMMERCIAL

## 2019-05-10 LAB
BACTERIA: ABNORMAL /HPF
BILIRUBIN URINE: NEGATIVE
BLOOD, URINE: ABNORMAL
CLARITY: CLEAR
COLOR: YELLOW
EPITHELIAL CELLS, UA: ABNORMAL /HPF
GLUCOSE URINE: NEGATIVE MG/DL
KETONES, URINE: NEGATIVE MG/DL
LEUKOCYTE ESTERASE, URINE: ABNORMAL
MICROSCOPIC EXAMINATION: YES
NITRITE, URINE: NEGATIVE
PH UA: 5.5 (ref 5–8)
PROTEIN UA: 100 MG/DL
RBC UA: ABNORMAL /HPF (ref 0–2)
SPECIFIC GRAVITY UA: 1.01 (ref 1–1.03)
URINE REFLEX TO CULTURE: YES
URINE TYPE: ABNORMAL
UROBILINOGEN, URINE: 0.2 E.U./DL
WBC UA: ABNORMAL /HPF (ref 0–5)
YEAST: PRESENT /HPF

## 2019-05-10 PROCEDURE — 81001 URINALYSIS AUTO W/SCOPE: CPT

## 2019-05-10 PROCEDURE — 87086 URINE CULTURE/COLONY COUNT: CPT

## 2019-05-11 ENCOUNTER — HOSPITAL ENCOUNTER (OUTPATIENT)
Facility: HOSPITAL | Age: 59
Discharge: HOME | End: 2019-05-11
Payer: MEDICARE

## 2019-05-11 LAB
24HR URINE VOLUME (ML): 1300 ML
CREAT SERPL-MCNC: 1.6 MG/DL (ref 0.6–1.2)
CREATININE 24 HOUR URINE: 0.9 G/24HR (ref 0.6–1.5)
CREATININE CLEARANCE: 27 ML/MIN (ref 88–128)
Lab: 24 HOURS
Lab: 24 HR
PROTEIN 24 HOUR URINE: 1.13 G/24HR
URINE CULTURE, ROUTINE: NORMAL

## 2019-05-11 PROCEDURE — 36415 COLL VENOUS BLD VENIPUNCTURE: CPT

## 2019-05-11 PROCEDURE — 84156 ASSAY OF PROTEIN URINE: CPT

## 2019-05-11 PROCEDURE — 82575 CREATININE CLEARANCE TEST: CPT

## 2019-05-12 LAB
ORGANISM: ABNORMAL
URINE CULTURE, ROUTINE: ABNORMAL
URINE CULTURE, ROUTINE: ABNORMAL

## 2019-05-14 LAB — MISCELLANEOUS LAB TEST ORDER: ABNORMAL

## 2019-05-17 ENCOUNTER — TELEPHONE (OUTPATIENT)
Dept: SURGERY | Facility: CLINIC | Age: 59
End: 2019-05-17

## 2019-05-17 NOTE — TELEPHONE ENCOUNTER
Spoke with family member states [facility] was supposed to bring her.i called [facility] nurse was not available I left message with pt nurse

## 2019-05-20 ENCOUNTER — OFFICE VISIT (OUTPATIENT)
Dept: PRIMARY CARE CLINIC | Age: 59
End: 2019-05-20
Payer: MEDICARE

## 2019-05-20 DIAGNOSIS — E11.8 TYPE 2 DIABETES MELLITUS WITH COMPLICATION, UNSPECIFIED WHETHER LONG TERM INSULIN USE: ICD-10-CM

## 2019-05-20 DIAGNOSIS — D64.9 ANEMIA, UNSPECIFIED TYPE: ICD-10-CM

## 2019-05-20 DIAGNOSIS — I10 ESSENTIAL HYPERTENSION: ICD-10-CM

## 2019-05-20 DIAGNOSIS — N18.9 CHRONIC KIDNEY DISEASE, UNSPECIFIED CKD STAGE: ICD-10-CM

## 2019-05-20 DIAGNOSIS — E66.01 MORBID OBESITY (HCC): ICD-10-CM

## 2019-05-20 DIAGNOSIS — R53.81 DECLINING FUNCTIONAL STATUS: Primary | ICD-10-CM

## 2019-05-20 PROCEDURE — 99308 SBSQ NF CARE LOW MDM 20: CPT | Performed by: NURSE PRACTITIONER

## 2019-05-23 DIAGNOSIS — M25.562 ARTHRALGIA OF LEFT KNEE: Primary | ICD-10-CM

## 2019-05-24 ENCOUNTER — OFFICE VISIT (OUTPATIENT)
Dept: ORTHOPEDIC SURGERY | Facility: CLINIC | Age: 59
End: 2019-05-24

## 2019-05-24 ENCOUNTER — HOSPITAL ENCOUNTER (OUTPATIENT)
Facility: HOSPITAL | Age: 59
Discharge: HOME | End: 2019-05-24
Payer: MEDICARE

## 2019-05-24 VITALS — BODY MASS INDEX: 48.2 KG/M2 | HEIGHT: 63 IN | RESPIRATION RATE: 18 BRPM | WEIGHT: 272 LBS

## 2019-05-24 DIAGNOSIS — M17.12 PRIMARY OSTEOARTHRITIS OF LEFT KNEE: ICD-10-CM

## 2019-05-24 DIAGNOSIS — M25.562 ARTHRALGIA OF LEFT KNEE: Primary | ICD-10-CM

## 2019-05-24 PROCEDURE — 20610 DRAIN/INJ JOINT/BURSA W/O US: CPT | Performed by: ORTHOPAEDIC SURGERY

## 2019-05-24 PROCEDURE — 99203 OFFICE O/P NEW LOW 30 MIN: CPT | Performed by: ORTHOPAEDIC SURGERY

## 2019-05-24 RX ORDER — TRIAMCINOLONE ACETONIDE 40 MG/ML
40 INJECTION, SUSPENSION INTRA-ARTICULAR; INTRAMUSCULAR
Status: COMPLETED | OUTPATIENT
Start: 2019-05-24 | End: 2019-05-24

## 2019-05-24 RX ORDER — LIDOCAINE HYDROCHLORIDE 10 MG/ML
2 INJECTION, SOLUTION INFILTRATION; PERINEURAL
Status: COMPLETED | OUTPATIENT
Start: 2019-05-24 | End: 2019-05-24

## 2019-05-24 RX ADMIN — TRIAMCINOLONE ACETONIDE 40 MG: 40 INJECTION, SUSPENSION INTRA-ARTICULAR; INTRAMUSCULAR at 12:14

## 2019-05-24 RX ADMIN — LIDOCAINE HYDROCHLORIDE 2 ML: 10 INJECTION, SOLUTION INFILTRATION; PERINEURAL at 12:14

## 2019-05-24 NOTE — PROGRESS NOTES
Subjective   Patient ID: Millicent Mckeon is a 58 y.o.   female referred by Dr. Rosa Zamora and is being seen for orthopaedic evaluation today for left knee pain  Pain of the Left Knee    CHIEF COMPLAINT:    Left Knee Pain    History of Present Illness     Patient presents with knee pain involving the left knee. Onset of the symptoms was several months ago. Inciting event: started hurting after she was placed in nursing home several months ago. Current symptoms include pain with movement of knee. Pain is aggravated by movement. Patient has had no prior knee problems. Evaluation to date: plain films: abnormal showing endstage varus tri-compartment osteoarthritis (supine, not weightbearing images).. Treatment to date: OTC analgesics which are somewhat effective and Lidoderm patches. Patient states she finished antibiotic several days ago      Past Medical History:   Diagnosis Date   • Anemia 10/2/2018   • Diabetes mellitus (CMS/HCC)    • Disease of thyroid gland    • GERD (gastroesophageal reflux disease)    • Hypertension         Past Surgical History:   Procedure Laterality Date   • EYE SURGERY     • INCISION AND DRAINAGE LEG Right 1/14/2019    Procedure: Right heel incision and drainage with graft application;  Surgeon: Ar Rueda DPM;  Location: [facility];  Service: Podiatry   • LEG SURGERY         Family History   Problem Relation Age of Onset   • Asthma Mother    • Hypertension Father    • Stroke Father         Social History     Socioeconomic History   • Marital status: Single     Spouse name: Not on file   • Number of children: Not on file   • Years of education: Not on file   • Highest education level: Not on file   Tobacco Use   • Smoking status: Never Smoker   • Smokeless tobacco: Never Used   Substance and Sexual Activity   • Alcohol use: No     Frequency: Never   • Drug use: No   • Sexual activity: Defer       Allergies   Allergen Reactions   • Metformin And Related Swelling     HA, diarrhea,  "throat swelling       Review of Systems   Constitutional: Negative for fever.   HENT: Negative for voice change.    Eyes: Negative for visual disturbance.   Respiratory: Negative for shortness of breath.    Cardiovascular: Negative for chest pain.   Gastrointestinal: Negative for abdominal pain.   Genitourinary: Negative for dysuria.   Musculoskeletal: Positive for arthralgias and gait problem ( patient is non-ambulatory due to pressure sore). Negative for joint swelling.   Skin: Positive for wound (right lateral heel pressure ulcer, dry, stable.). Negative for rash.        Lymphedema both legs with elephantiasis skin scaling of lower legs.   Neurological: Negative for speech difficulty.   Hematological: Does not bruise/bleed easily.   Psychiatric/Behavioral: Negative for confusion.       [x]  The past medical history, past surgical history, family history, social history, allergies and review of systems have been reviewed by myself.    Objective   Resp 18   Ht 161 cm (63.39\")   Wt 123 kg (272 lb)   BMI 47.60 kg/m²    Physical Exam   Constitutional: She appears well-developed. No distress.   Musculoskeletal:        Left knee: She exhibits no effusion.   Neurological: She is alert.   Skin: Skin is warm and dry. No rash noted. No erythema.   Psychiatric: She has a normal mood and affect. Her speech is normal.   Vitals reviewed.    Left Knee Exam     Tenderness   Left knee tenderness location: diffuse.    Range of Motion   Extension: 0   Flexion: 40     Tests   Varus: positive Valgus: negative  Patellar apprehension: negative    Other   Erythema: absent  Effusion: no effusion present          Neurologic Exam     Mental Status   Attention: normal.   Speech: speech is normal   Level of consciousness: alert  Knowledge: good.     Motor Exam   Overall muscle tone: normal    Assessment/Plan   Independent Review of Radiographic Studies:    Reviewed images and agree with radiologist interpretation.  Laboratory and Other " Studies:  No new results reviewed today.   Medical Decision Making:    No complications of procedure and treatments.    Large Joint Arthrocentesis: L knee  Date/Time: 5/24/2019 12:14 PM  Consent given by: patient  Site marked: site marked  Timeout: Immediately prior to procedure a time out was called to verify the correct patient, procedure, equipment, support staff and site/side marked as required   Supporting Documentation  Indications: pain   Procedure Details  Location: knee - L knee  Preparation: Patient was prepped and draped in the usual sterile fashion  Needle size: 22 G  Approach: anterolateral  Medications administered: 2 mL lidocaine 1 %; 40 mg triamcinolone acetonide 40 MG/ML  Patient tolerance: patient tolerated the procedure well with no immediate complications          Millicent was seen today for pain.    Diagnoses and all orders for this visit:    Arthralgia of left knee    Primary osteoarthritis of left knee    Other orders  -     Large Joint Arthrocentesis      Discussion of orthopaedic goals and activities and patient and/or guardian expressed appreciation.  Call or notify for any adverse effect from injection therapy  Ice, heat, and/or modalities as beneficial  Watch for signs and symptoms of infection  Guided on proper techniques for mobility, strength, agility and/or conditioning exercises    Recommendations/Plan:  Exercise, medications, injections, other patient advice, and return appointment as noted.  Brace: No brace was given at today's visit  Referral: No referrals made at today's visit  Test/Studies: No additional studies ordered.  Work/Activity Status: May perform usual activities as tolerated and No strenuous activity.    Return in about 3 months (around 8/24/2019) for Recheck.  Patient is encouraged and agreeable to call or return sooner for any issues or concerns.

## 2019-05-28 DIAGNOSIS — E11.42 DIABETIC POLYNEUROPATHY ASSOCIATED WITH TYPE 2 DIABETES MELLITUS (HCC): ICD-10-CM

## 2019-05-28 RX ORDER — GABAPENTIN 600 MG/1
TABLET ORAL
Refills: 0 | COMMUNITY
Start: 2019-04-27 | End: 2019-05-28

## 2019-05-28 RX ORDER — GABAPENTIN 300 MG/1
300 CAPSULE ORAL 2 TIMES DAILY
Qty: 60 CAPSULE | Refills: 0 | Status: ON HOLD | OUTPATIENT
Start: 2019-05-28 | End: 2019-06-06 | Stop reason: HOSPADM

## 2019-05-29 ENCOUNTER — HOSPITAL ENCOUNTER (INPATIENT)
Facility: HOSPITAL | Age: 59
LOS: 2 days | Discharge: HOME HEALTH | DRG: 641 | End: 2019-05-31
Attending: EMERGENCY MEDICINE | Admitting: INTERNAL MEDICINE
Payer: MEDICARE

## 2019-05-29 DIAGNOSIS — R11.2 NON-INTRACTABLE VOMITING WITH NAUSEA, UNSPECIFIED VOMITING TYPE: ICD-10-CM

## 2019-05-29 DIAGNOSIS — N18.9 CHRONIC RENAL IMPAIRMENT, UNSPECIFIED CKD STAGE: ICD-10-CM

## 2019-05-29 DIAGNOSIS — E87.5 HYPERKALEMIA: ICD-10-CM

## 2019-05-29 DIAGNOSIS — R00.0 TACHYCARDIA: Primary | ICD-10-CM

## 2019-05-29 DIAGNOSIS — I87.8 VENOUS STASIS OF BOTH LOWER EXTREMITIES: ICD-10-CM

## 2019-05-29 LAB
A/G RATIO: 1.1 (ref 0.8–2)
ALBUMIN SERPL-MCNC: 4 G/DL (ref 3.4–4.8)
ALP BLD-CCNC: 169 U/L (ref 25–100)
ALT SERPL-CCNC: 14 U/L (ref 4–36)
ANION GAP SERPL CALCULATED.3IONS-SCNC: 14 MMOL/L (ref 3–16)
AST SERPL-CCNC: 13 U/L (ref 8–33)
BASOPHILS ABSOLUTE: 0 K/UL (ref 0–0.1)
BASOPHILS RELATIVE PERCENT: 0.4 %
BILIRUB SERPL-MCNC: 0.7 MG/DL (ref 0.3–1.2)
BILIRUBIN URINE: NEGATIVE
BLOOD, URINE: ABNORMAL
BUN BLDV-MCNC: 52 MG/DL (ref 6–20)
CALCIUM SERPL-MCNC: 10.1 MG/DL (ref 8.5–10.5)
CHLORIDE BLD-SCNC: 106 MMOL/L (ref 98–107)
CLARITY: CLEAR
CO2: 17 MMOL/L (ref 20–30)
COLOR: YELLOW
CREAT SERPL-MCNC: 1.8 MG/DL (ref 0.4–1.2)
EOSINOPHILS ABSOLUTE: 0.1 K/UL (ref 0–0.4)
EOSINOPHILS RELATIVE PERCENT: 1.1 %
EPITHELIAL CELLS, UA: ABNORMAL /HPF
GFR AFRICAN AMERICAN: 35
GFR NON-AFRICAN AMERICAN: 29
GLOBULIN: 3.7 G/DL
GLUCOSE BLD-MCNC: 216 MG/DL (ref 74–106)
GLUCOSE BLD-MCNC: 247 MG/DL (ref 74–106)
GLUCOSE URINE: NEGATIVE MG/DL
HCT VFR BLD CALC: 38.4 % (ref 37–47)
HEMOGLOBIN: 11.6 G/DL (ref 11.5–16.5)
IMMATURE GRANULOCYTES #: 0 K/UL
IMMATURE GRANULOCYTES %: 0.3 % (ref 0–5)
KETONES, URINE: NEGATIVE MG/DL
LEUKOCYTE ESTERASE, URINE: NEGATIVE
LIPASE: 26 U/L (ref 5.6–51.3)
LYMPHOCYTES ABSOLUTE: 1.6 K/UL (ref 1.5–4)
LYMPHOCYTES RELATIVE PERCENT: 16.6 %
MCH RBC QN AUTO: 25.3 PG (ref 27–32)
MCHC RBC AUTO-ENTMCNC: 30.2 G/DL (ref 31–35)
MCV RBC AUTO: 83.7 FL (ref 80–100)
MICROSCOPIC EXAMINATION: YES
MONOCYTES ABSOLUTE: 0.7 K/UL (ref 0.2–0.8)
MONOCYTES RELATIVE PERCENT: 7.5 %
MUCUS: ABNORMAL /LPF
NEUTROPHILS ABSOLUTE: 7.3 K/UL (ref 2–7.5)
NEUTROPHILS RELATIVE PERCENT: 74.1 %
NITRITE, URINE: NEGATIVE
PDW BLD-RTO: 23.1 % (ref 11–16)
PERFORMED ON: ABNORMAL
PH UA: 5.5 (ref 5–8)
PLATELET # BLD: 224 K/UL (ref 150–400)
PMV BLD AUTO: 11.1 FL (ref 6–10)
POTASSIUM REFLEX MAGNESIUM: 6 MMOL/L (ref 3.4–5.1)
PROTEIN UA: >=300 MG/DL
RBC # BLD: 4.59 M/UL (ref 3.8–5.8)
RBC UA: ABNORMAL /HPF (ref 0–2)
SODIUM BLD-SCNC: 137 MMOL/L (ref 136–145)
SPECIFIC GRAVITY UA: 1.02 (ref 1–1.03)
TOTAL PROTEIN: 7.7 G/DL (ref 6.4–8.3)
TROPONIN: <0.3 NG/ML
URINE REFLEX TO CULTURE: ABNORMAL
URINE TYPE: ABNORMAL
UROBILINOGEN, URINE: 0.2 E.U./DL
WBC # BLD: 9.9 K/UL (ref 4–11)
WBC UA: ABNORMAL /HPF (ref 0–5)

## 2019-05-29 PROCEDURE — 83690 ASSAY OF LIPASE: CPT

## 2019-05-29 PROCEDURE — 6360000002 HC RX W HCPCS: Performed by: NURSE PRACTITIONER

## 2019-05-29 PROCEDURE — 96374 THER/PROPH/DIAG INJ IV PUSH: CPT

## 2019-05-29 PROCEDURE — 6370000000 HC RX 637 (ALT 250 FOR IP): Performed by: PEDIATRICS

## 2019-05-29 PROCEDURE — 6360000002 HC RX W HCPCS: Performed by: EMERGENCY MEDICINE

## 2019-05-29 PROCEDURE — 2580000003 HC RX 258: Performed by: EMERGENCY MEDICINE

## 2019-05-29 PROCEDURE — 6370000000 HC RX 637 (ALT 250 FOR IP)

## 2019-05-29 PROCEDURE — 2580000003 HC RX 258: Performed by: NURSE PRACTITIONER

## 2019-05-29 PROCEDURE — 84484 ASSAY OF TROPONIN QUANT: CPT

## 2019-05-29 PROCEDURE — 6370000000 HC RX 637 (ALT 250 FOR IP): Performed by: EMERGENCY MEDICINE

## 2019-05-29 PROCEDURE — 36415 COLL VENOUS BLD VENIPUNCTURE: CPT

## 2019-05-29 PROCEDURE — 85025 COMPLETE CBC W/AUTO DIFF WBC: CPT

## 2019-05-29 PROCEDURE — 81001 URINALYSIS AUTO W/SCOPE: CPT

## 2019-05-29 PROCEDURE — 99285 EMERGENCY DEPT VISIT HI MDM: CPT

## 2019-05-29 PROCEDURE — 6370000000 HC RX 637 (ALT 250 FOR IP): Performed by: NURSE PRACTITIONER

## 2019-05-29 PROCEDURE — 96361 HYDRATE IV INFUSION ADD-ON: CPT

## 2019-05-29 PROCEDURE — 1200000000 HC SEMI PRIVATE

## 2019-05-29 PROCEDURE — 80053 COMPREHEN METABOLIC PANEL: CPT

## 2019-05-29 RX ORDER — ACETAMINOPHEN 325 MG/1
650 TABLET ORAL EVERY 6 HOURS PRN
Status: DISCONTINUED | OUTPATIENT
Start: 2019-05-29 | End: 2019-05-31 | Stop reason: HOSPADM

## 2019-05-29 RX ORDER — SODIUM CHLORIDE 9 MG/ML
INJECTION, SOLUTION INTRAVENOUS CONTINUOUS
Status: ACTIVE | OUTPATIENT
Start: 2019-05-29 | End: 2019-05-30

## 2019-05-29 RX ORDER — ARGININE 500 MG
500 TABLET ORAL DAILY
Status: DISCONTINUED | OUTPATIENT
Start: 2019-05-29 | End: 2019-05-29 | Stop reason: RX

## 2019-05-29 RX ORDER — ACETAMINOPHEN 500 MG
1000 TABLET ORAL ONCE
Status: COMPLETED | OUTPATIENT
Start: 2019-05-29 | End: 2019-05-29

## 2019-05-29 RX ORDER — M-VIT,TX,IRON,MINS/CALC/FOLIC 27MG-0.4MG
1 TABLET ORAL DAILY
Status: DISCONTINUED | OUTPATIENT
Start: 2019-05-29 | End: 2019-05-31 | Stop reason: HOSPADM

## 2019-05-29 RX ORDER — ONDANSETRON 2 MG/ML
4 INJECTION INTRAMUSCULAR; INTRAVENOUS EVERY 6 HOURS PRN
Status: DISCONTINUED | OUTPATIENT
Start: 2019-05-29 | End: 2019-05-31 | Stop reason: HOSPADM

## 2019-05-29 RX ORDER — ONDANSETRON 2 MG/ML
4 INJECTION INTRAMUSCULAR; INTRAVENOUS EVERY 30 MIN PRN
Status: DISCONTINUED | OUTPATIENT
Start: 2019-05-29 | End: 2019-05-29

## 2019-05-29 RX ORDER — NICOTINE POLACRILEX 4 MG
15 LOZENGE BUCCAL PRN
Status: DISCONTINUED | OUTPATIENT
Start: 2019-05-29 | End: 2019-05-31 | Stop reason: HOSPADM

## 2019-05-29 RX ORDER — HYDRALAZINE HYDROCHLORIDE 10 MG/1
10 TABLET, FILM COATED ORAL 2 TIMES DAILY
Status: DISCONTINUED | OUTPATIENT
Start: 2019-05-29 | End: 2019-05-30

## 2019-05-29 RX ORDER — SODIUM CHLORIDE 0.9 % (FLUSH) 0.9 %
10 SYRINGE (ML) INJECTION PRN
Status: DISCONTINUED | OUTPATIENT
Start: 2019-05-29 | End: 2019-05-31 | Stop reason: HOSPADM

## 2019-05-29 RX ORDER — ISOSORBIDE MONONITRATE 30 MG/1
30 TABLET, EXTENDED RELEASE ORAL DAILY
Status: DISCONTINUED | OUTPATIENT
Start: 2019-05-29 | End: 2019-05-31 | Stop reason: HOSPADM

## 2019-05-29 RX ORDER — DOCUSATE SODIUM 100 MG/1
100 CAPSULE, LIQUID FILLED ORAL 2 TIMES DAILY
Status: DISCONTINUED | OUTPATIENT
Start: 2019-05-29 | End: 2019-05-31 | Stop reason: HOSPADM

## 2019-05-29 RX ORDER — FERROUS SULFATE TAB EC 324 MG (65 MG FE EQUIVALENT) 324 (65 FE) MG
324 TABLET DELAYED RESPONSE ORAL 2 TIMES DAILY
Status: DISCONTINUED | OUTPATIENT
Start: 2019-05-29 | End: 2019-05-31 | Stop reason: HOSPADM

## 2019-05-29 RX ORDER — POLYETHYLENE GLYCOL 3350 17 G/17G
17 POWDER ORAL 2 TIMES DAILY
Status: DISCONTINUED | OUTPATIENT
Start: 2019-05-29 | End: 2019-05-31 | Stop reason: HOSPADM

## 2019-05-29 RX ORDER — ISOSORBIDE MONONITRATE 30 MG/1
30 TABLET, EXTENDED RELEASE ORAL ONCE
Status: COMPLETED | OUTPATIENT
Start: 2019-05-29 | End: 2019-05-29

## 2019-05-29 RX ORDER — DEXTROSE MONOHYDRATE 50 MG/ML
100 INJECTION, SOLUTION INTRAVENOUS PRN
Status: DISCONTINUED | OUTPATIENT
Start: 2019-05-29 | End: 2019-05-31 | Stop reason: HOSPADM

## 2019-05-29 RX ORDER — PANTOPRAZOLE SODIUM 40 MG/1
40 TABLET, DELAYED RELEASE ORAL DAILY
Status: DISCONTINUED | OUTPATIENT
Start: 2019-05-29 | End: 2019-05-31 | Stop reason: HOSPADM

## 2019-05-29 RX ORDER — LEVOTHYROXINE SODIUM 0.07 MG/1
150 TABLET ORAL DAILY
Status: DISCONTINUED | OUTPATIENT
Start: 2019-05-29 | End: 2019-05-31 | Stop reason: HOSPADM

## 2019-05-29 RX ORDER — GABAPENTIN 300 MG/1
300 CAPSULE ORAL 2 TIMES DAILY
Status: DISCONTINUED | OUTPATIENT
Start: 2019-05-29 | End: 2019-05-31 | Stop reason: HOSPADM

## 2019-05-29 RX ORDER — LACTOBACILLUS RHAMNOSUS GG 10B CELL
CAPSULE ORAL 2 TIMES DAILY
Status: DISCONTINUED | OUTPATIENT
Start: 2019-05-29 | End: 2019-05-31 | Stop reason: HOSPADM

## 2019-05-29 RX ORDER — METOPROLOL TARTRATE 50 MG/1
100 TABLET, FILM COATED ORAL 2 TIMES DAILY
Status: DISCONTINUED | OUTPATIENT
Start: 2019-05-29 | End: 2019-05-31 | Stop reason: HOSPADM

## 2019-05-29 RX ORDER — ZINC SULFATE 50(220)MG
220 CAPSULE ORAL 2 TIMES DAILY
Status: DISCONTINUED | OUTPATIENT
Start: 2019-05-29 | End: 2019-05-31 | Stop reason: HOSPADM

## 2019-05-29 RX ORDER — ASPIRIN 81 MG/1
81 TABLET, CHEWABLE ORAL DAILY
Status: DISCONTINUED | OUTPATIENT
Start: 2019-05-29 | End: 2019-05-31 | Stop reason: HOSPADM

## 2019-05-29 RX ORDER — SIMVASTATIN 20 MG
20 TABLET ORAL NIGHTLY
Status: DISCONTINUED | OUTPATIENT
Start: 2019-05-29 | End: 2019-05-31 | Stop reason: HOSPADM

## 2019-05-29 RX ORDER — 0.9 % SODIUM CHLORIDE 0.9 %
1000 INTRAVENOUS SOLUTION INTRAVENOUS ONCE
Status: COMPLETED | OUTPATIENT
Start: 2019-05-29 | End: 2019-05-29

## 2019-05-29 RX ORDER — DEXTROSE MONOHYDRATE 25 G/50ML
12.5 INJECTION, SOLUTION INTRAVENOUS PRN
Status: DISCONTINUED | OUTPATIENT
Start: 2019-05-29 | End: 2019-05-31 | Stop reason: HOSPADM

## 2019-05-29 RX ORDER — SODIUM POLYSTYRENE SULFONATE 15 G/60ML
30 SUSPENSION ORAL; RECTAL ONCE
Status: COMPLETED | OUTPATIENT
Start: 2019-05-29 | End: 2019-05-29

## 2019-05-29 RX ORDER — SODIUM CHLORIDE 0.9 % (FLUSH) 0.9 %
10 SYRINGE (ML) INJECTION EVERY 12 HOURS SCHEDULED
Status: DISCONTINUED | OUTPATIENT
Start: 2019-05-29 | End: 2019-05-31 | Stop reason: HOSPADM

## 2019-05-29 RX ADMIN — ASPIRIN 81 MG 81 MG: 81 TABLET ORAL at 17:26

## 2019-05-29 RX ADMIN — DOCUSATE SODIUM 100 MG: 100 CAPSULE, LIQUID FILLED ORAL at 20:30

## 2019-05-29 RX ADMIN — INSULIN LISPRO 1 UNITS: 100 INJECTION, SOLUTION INTRAVENOUS; SUBCUTANEOUS at 20:30

## 2019-05-29 RX ADMIN — ENOXAPARIN SODIUM 40 MG: 40 INJECTION SUBCUTANEOUS at 17:27

## 2019-05-29 RX ADMIN — Medication 1 CAPSULE: at 20:30

## 2019-05-29 RX ADMIN — SODIUM CHLORIDE: 9 INJECTION, SOLUTION INTRAVENOUS at 17:26

## 2019-05-29 RX ADMIN — ONDANSETRON 4 MG: 2 INJECTION INTRAMUSCULAR; INTRAVENOUS at 12:19

## 2019-05-29 RX ADMIN — LEVOTHYROXINE SODIUM 150 MCG: 125 TABLET ORAL at 17:26

## 2019-05-29 RX ADMIN — SODIUM POLYSTYRENE SULFONATE 15 G: 15 SUSPENSION ORAL; RECTAL at 13:21

## 2019-05-29 RX ADMIN — INSULIN LISPRO 2 UNITS: 100 INJECTION, SOLUTION INTRAVENOUS; SUBCUTANEOUS at 17:13

## 2019-05-29 RX ADMIN — HYDRALAZINE HYDROCHLORIDE 10 MG: 10 TABLET, FILM COATED ORAL at 20:30

## 2019-05-29 RX ADMIN — PANTOPRAZOLE SODIUM 40 MG: 40 TABLET, DELAYED RELEASE ORAL at 17:26

## 2019-05-29 RX ADMIN — ONDANSETRON 4 MG: 2 INJECTION INTRAMUSCULAR; INTRAVENOUS at 17:37

## 2019-05-29 RX ADMIN — MICONAZOLE NITRATE: 20 CREAM TOPICAL at 20:41

## 2019-05-29 RX ADMIN — FERROUS SULFATE TAB EC 324 MG (65 MG FE EQUIVALENT) 324 MG: 324 (65 FE) TABLET DELAYED RESPONSE at 20:30

## 2019-05-29 RX ADMIN — GABAPENTIN 300 MG: 300 CAPSULE ORAL at 20:30

## 2019-05-29 RX ADMIN — MICONAZOLE NITRATE: 20 CREAM TOPICAL at 17:27

## 2019-05-29 RX ADMIN — ISOSORBIDE MONONITRATE 30 MG: 30 TABLET, EXTENDED RELEASE ORAL at 17:26

## 2019-05-29 RX ADMIN — METOPROLOL TARTRATE 100 MG: 50 TABLET ORAL at 20:30

## 2019-05-29 RX ADMIN — ISOSORBIDE MONONITRATE 30 MG: 30 TABLET, EXTENDED RELEASE ORAL at 20:30

## 2019-05-29 RX ADMIN — ACETAMINOPHEN 1000 MG: 500 TABLET, FILM COATED ORAL at 13:20

## 2019-05-29 RX ADMIN — SIMVASTATIN 20 MG: 20 TABLET, FILM COATED ORAL at 20:30

## 2019-05-29 RX ADMIN — ZINC SULFATE 220 MG (50 MG) CAPSULE 220 MG: CAPSULE at 20:30

## 2019-05-29 RX ADMIN — MULTIPLE VITAMINS W/ MINERALS TAB 1 TABLET: TAB at 17:26

## 2019-05-29 RX ADMIN — SODIUM CHLORIDE 1000 ML: 9 INJECTION, SOLUTION INTRAVENOUS at 12:19

## 2019-05-29 RX ADMIN — POLYETHYLENE GLYCOL 3350 17 G: 17 POWDER, FOR SOLUTION ORAL at 20:29

## 2019-05-29 ASSESSMENT — PAIN SCALES - GENERAL
PAINLEVEL_OUTOF10: 5
PAINLEVEL_OUTOF10: 4

## 2019-05-29 ASSESSMENT — PAIN DESCRIPTION - LOCATION: LOCATION: ABDOMEN

## 2019-05-29 ASSESSMENT — PAIN DESCRIPTION - DESCRIPTORS: DESCRIPTORS: SORE

## 2019-05-29 ASSESSMENT — PAIN DESCRIPTION - PAIN TYPE: TYPE: ACUTE PAIN

## 2019-05-29 ASSESSMENT — PAIN DESCRIPTION - FREQUENCY: FREQUENCY: CONTINUOUS

## 2019-05-29 NOTE — ED NOTES
Sathya called at this time and call transferred to Dr. Adriana Wood at this time.      Mitzi Rangel RN  05/29/19 7485

## 2019-05-29 NOTE — ED NOTES
Patient with a dressing under her left breast noted. Patient with a few blisters/abrasions to left hip. Coccyx area red and excoriated.      Sabrina Yanes RN  05/29/19 275 Alhaji Nunes RN  05/29/19 1435

## 2019-05-29 NOTE — ED NOTES
Verified at this time with patient who her poa is, she stated the Anabella Melton is (her niece) and may give her any information needed regarding her care.      Pina Victoria RN  05/29/19 3165

## 2019-05-29 NOTE — ED NOTES
Patient refused 15 gms of her kayexalate, states that she doesn't want it, \"it makes her go to the bathroom. \"     Pina Victoria, RN  05/29/19 161 [facility], RICHARD  05/29/19 5570

## 2019-05-29 NOTE — ED NOTES
Patient states that she was discharged from [address] [place] [address] yesterday, had been there for her foot, states that [facility] brought her home states that she was car sick and has been sick ever since she got home, family on scene told [facility] that they want her to go back to [address] [place] [address].      Annamaria Houston RN  05/29/19 8990

## 2019-05-29 NOTE — ED NOTES
Told registration that family could come back, Heri Nine states that family already went home.      Tran Galo RN  05/29/19 5770

## 2019-05-29 NOTE — ED NOTES
Patients poa Estuardo Burch called at this time to ask how patient doing and to let us know that they want patient to go back to a nursing home [facility] or [facility], she states that [facility] is full at this time but may have a bed available in a few days.      Hali Grigsby RN  05/29/19 7900

## 2019-05-29 NOTE — PLAN OF CARE
Problem: Falls - Risk of:  Goal: Will remain free from falls  Description  Will remain free from falls  Outcome: Ongoing  Goal: Absence of physical injury  Description  Absence of physical injury  Outcome: Ongoing     Problem: Infection:  Goal: Will remain free from infection  Description  Will remain free from infection  Outcome: Ongoing     Problem: Safety:  Goal: Free from accidental physical injury  Description  Free from accidental physical injury  Outcome: Ongoing  Goal: Free from intentional harm  Description  Free from intentional harm  Outcome: Ongoing     Problem: Daily Care:  Goal: Daily care needs are met  Description  Daily care needs are met  Outcome: Ongoing     Problem: Pain:  Goal: Patient's pain/discomfort is manageable  Description  Patient's pain/discomfort is manageable  Outcome: Ongoing     Problem: Skin Integrity:  Goal: Skin integrity will stabilize  Description  Skin integrity will stabilize  Outcome: Ongoing     Problem: Discharge Planning:  Goal: Patients continuum of care needs are met  Description  Patients continuum of care needs are met  Outcome: Ongoing

## 2019-05-29 NOTE — ED NOTES
Pharmacy called at this time to obtain correct medication list.     Annamaria Houston RN  05/29/19 8895

## 2019-05-29 NOTE — ED NOTES
Heri Terry here from pharmacy at this time to go over patients medications.      Sabrina Yanes RN  05/29/19 6150

## 2019-05-29 NOTE — PROGRESS NOTES
Spoke with Arely Pedro NP about the patients bottom reddened and rash noted under both breast. New orders received for Imelda antifungal . Will continue to monitor.

## 2019-05-29 NOTE — PROGRESS NOTES
Med rec completed using Physician Order Sheet from [facility] and list of meds sent to [facility] in [place] yesterday waiting to be picked up. Patient is poor historian, unable to verify any medications with any certainty. Omnicef removed from med list.  Isosorbide not on list from [facility]. Verified with [facility] that this medication was ordered. It was left off of his list in error as it was out-of-stock and on order. Gabapentin also not on [facility] list.  There is a refill request in Epic to JOSE Dunaway, requesting that she refill this medication as it is a controlled substance. Patient was receiving in nursing home.

## 2019-05-29 NOTE — PROGRESS NOTES
Spoke with Dr Renate Diaz about the patients elevated /113 Hrt 119. Patient stated she vomited up the imdur given earlier . Orders received to give another dose of imdur 30 mg now then check the Bp and Hrt later. Notified night shift RN Duane Isaac to follow up.

## 2019-05-29 NOTE — ED PROVIDER NOTES
[facility] ENCOUNTER      Pt Name: Dionna Wilson  MRN: [id number]  YOB: 1960  Date of evaluation: 5/29/2019  Provider: Krysten Vance, [address] [place] [address]       Chief Complaint   Patient presents with    Emesis         HISTORY OF PRESENT ILLNESS  (Location/Symptom, Timing/Onset, Context/Setting, Quality, Duration, Modifying Factors, Severity.)   Dionna Wilson is a 59 y.o. female who presents to the emergency department via EMS for evaluation of nausea, vomiting, states she has been battling with motion sickness and she was transported home from the nursing facility yesterday. She is at home with other family members but does have chronic illnesses which they help treat home. The patient returns today with continued nausea, vomiting, denies any fevers, no chest pain or difficulty breathing, she states she was at the nursing facility recently being treated for lower extremity wounds which she notes have healed which is why she wanted to go home. Denies any other acute systemic complaints at this time. Nursing notes were reviewed. REVIEW OFSYSTEMS    (2-9 systems for level 4, 10 or more for level 5)   ROS:  General:  No fevers, no chills, no weakness  Cardiovascular:  No chest pain, no palpitations  Respiratory:  No shortness of breath, no cough, no wheezing  Gastrointestinal:  No pain, + vomiting, no diarrhea  Musculoskeletal:  No muscle pain, no joint pain  Skin:  No rash, no easy bruising  Neurologic:  No speech problems, no headache, no extremity weakness  Psychiatric:  No anxiety  Genitourinary:  No dysuria, no hematuria    Except as noted above the remainder of the review of systems was reviewed and negative.        PAST MEDICAL HISTORY     Past Medical History:   Diagnosis Date    Acute metabolic encephalopathy     Acute renal failure (ARF) (HCC)     Anemia     Chronic venous insufficiency     GERD (gastroesophageal reflux disease) 12/2013    Severe Reflux Esophagitis and hemorrhagic gastritis    Headache(784.0)     Hypertension     Hypothyroidism     Thrombocytopenia (HCC)     Type II or unspecified type diabetes mellitus without mention of complication, not stated as uncontrolled     Vitamin B12 deficiency          SURGICAL HISTORY       Past Surgical History:   Procedure Laterality Date    EYE SURGERY      LEG SURGERY      VT COLONOSCOPY FLX DX W/COLLJ SPEC WHEN PFRMD N/A 10/5/2018    COLONOSCOPY DIAGNOSTIC OR SCREENING performed by Corey Miller MD at [address] [address] EGD TRANSORAL BIOPSY SINGLE/MULTIPLE N/A 10/5/2018    EGD BIOPSY performed by Corey Miller MD at [address]       Previous Medications    ACETAMINOPHEN (TYLENOL) 325 MG TABLET    Take 650 mg by mouth every 6 hours as needed for Pain    AMMONIUM LACTATE (AMLACTIN) 12 % CREAM    Apply topically 2 times daily Apply topically as needed. ARGININE 500 MG TABLET    Take 500 mg by mouth daily    ASPIRIN 81 MG CHEWABLE TABLET    Take 81 mg by mouth daily    BISOPROLOL (ZEBETA) 10 MG TABLET    Take 1 tablet by mouth 2 times daily    BLOOD GLUCOSE TEST STRIPS (FREESTYLE TEST STRIPS) STRIP    Daily dx:250.00    CEFDINIR (OMNICEF) 300 MG CAPSULE    Take 1 capsule by mouth 2 times daily    DOCUSATE SODIUM (COLACE) 100 MG CAPSULE    Take 100 mg by mouth 2 times daily    FERROUS SULFATE (IRON) 325 (65 FE) MG TABS    take 1 tablet by mouth twice a day with food    GABAPENTIN (NEURONTIN) 300 MG CAPSULE    Take 1 capsule by mouth 2 times daily for 2 days.     GLUCOSE MONITORING KIT (FREESTYLE) MONITORING KIT    1 kit by Does not apply route daily as needed (elevated glucose) Dx e 11.9    HYDRALAZINE (APRESOLINE) 10 MG TABLET    Take 10 mg by mouth 2 times daily    INSULIN ASPART (NOVOLOG) 100 UNIT/ML INJECTION VIAL    Inject into the skin 3 times daily (before meals) Per low dose sliding scale protocol (Patient was receiving Novolog in nursing home, Humalog is waiting at pharmacy to be picked up). ISOSORBIDE MONONITRATE (ISMO;MONOKET) 10 MG TABLET    Take 15 mg by mouth daily    LEVOTHYROXINE (SYNTHROID) 100 MCG TABLET    Take 100 mcg by mouth Daily Patient is taking total of 150 mcg per day. LEVOTHYROXINE (SYNTHROID) 50 MCG TABLET    Take 1 tablet by mouth Daily    LIDOCAINE (LIDODERM) 5 %    Place 1 patch onto the skin daily Apply to left knee every morning and remove every evening (12 hours on, 12 hours off). MULTIPLE VITAMINS-MINERALS (THERAPEUTIC MULTIVITAMIN-MINERALS) TABLET    Take 1 tablet by mouth daily    PANTOPRAZOLE (PROTONIX) 40 MG TABLET    Take 40 mg by mouth daily    POLYETHYLENE GLYCOL (MIRALAX) POWDER    Take 17 g by mouth 2 times daily    RA VITAMIN C 500 MG TABLET    TAKE 1 TABLET BY MOUTH DAILY    SACCHAROMYCES BOULARDII (FLORASTOR) 250 MG CAPSULE    Take 250 mg by mouth 2 times daily    SIMVASTATIN (ZOCOR) 20 MG TABLET    take 1 tablet by mouth at bedtime    SKIN PROTECTANTS, MISC. (DIMETHICONE-ZINC OXIDE) CREAM    Apply 1 Dose topically as needed for Dry Skin (for redness) Apply topically 2 times daily as needed. WOUND DRESSINGS ([facility] WOUND/BURN DRESSING) PSTE PASTE    Apply 1 g topically as needed Apply daily and prn to right heel and cover with dry dressing until healed.     ZINC SULFATE (ZINCATE) 220 (50 ZN) MG CAPSULE    Take 220 mg by mouth 2 times daily       ALLERGIES     Metformin and related    FAMILY HISTORY       Family History   Problem Relation Age of Onset    Asthma Mother     High Blood Pressure Father     Stroke Father           SOCIAL HISTORY       Social History     Socioeconomic History    Marital status: Single     Spouse name: None    Number of children: None    Years of education: None    Highest education level: None   Occupational History    None   Social Needs    Financial resource strain: None    Food insecurity:     Worry: None     Inability: None    Transportation needs:     Medical: None     Non-medical: None   Tobacco Use    Smoking status: Never Smoker    Smokeless tobacco: Never Used   Substance and Sexual Activity    Alcohol use: No     Alcohol/week: 0.0 oz    Drug use: No    Sexual activity: None   Lifestyle    Physical activity:     Days per week: None     Minutes per session: None    Stress: None   Relationships    Social connections:     Talks on phone: None     Gets together: None     Attends Nondenominational service: None     Active member of club or organization: None     Attends meetings of clubs or organizations: None     Relationship status: None    Intimate partner violence:     Fear of current or ex partner: None     Emotionally abused: None     Physically abused: None     Forced sexual activity: None   Other Topics Concern    None   Social History Narrative    None         PHYSICAL EXAM    (up to 7 for level 4, 8 or more for level 5)     ED Triage Vitals   BP Temp Temp src Pulse Resp SpO2 Height Weight   -- -- -- -- -- -- -- --       Physical Exam  General :Patient is awake, alert, oriented, in no acute distress, nontoxic appearing. Appears chronically ill. HEENT: Pupils are equally round and reactive to light, EOMI, conjunctivae clear. Oral mucosa is moist, no exudate. Uvula is midline  Neck: Neck is supple, full range of motion, trachea midline  Cardiac: Heart tachycardic rate, regular rhythm, no murmurs, rubs, or gallops  Lungs: Lungs with decreased breath sounds bilaterally, no wheezing or rhonchi. No use of accessory muscles. Chest wall: There is no tenderness to palpation over the chest wall or over ribs  Abdomen: Abdomen is obese, soft, nontender, nondistended. There is no firm or pulsatile masses, no rebound rigidity or guarding. Musculoskeletal: Venous stasis changes to the bilateral lower extremities with multiple areas of scarring, superficial ulcerations and keloids.   Generalized lower extremity edema which appears chronic in nature. No focal muscle deficits are appreciated  Neuro: Motor intact, sensory intact, level of consciousness is normal, gcs 15  Dermatology: Venous stasis changes bilateral lower extremities. Skin is warm and dry  Psych: Mentation is grossly normal, cognition is grossly normal. Affect is appropriate. DIAGNOSTIC RESULTS     EKG: All EKG's are interpreted by the Emergency Department Physician who either signs or Co-signs this chart in the [address] a cardiologist.    The EKG interpreted by me shows atrial flutter at 115 bpm, T-wave inversions in the lateral leads, similar changes noted on prior EKG from April 16, 2019.     RADIOLOGY:   Non-plain film images such as CT, Ultrasound and MRI are read by the radiologist. Plain radiographic images are visualized and preliminarily interpreted by the emergency physician with the below findings:      ? Radiologist's Report Reviewed:  No orders to display         ED BEDSIDE ULTRASOUND:   Performed by ED Physician - none    LABS:    I have reviewed and interpreted all of the currently available lab results from this visit (ifapplicable):  Results for orders placed or performed during the hospital encounter of 05/29/19   CBC Auto Differential   Result Value Ref Range    WBC 9.9 4.0 - 11.0 K/uL    RBC 4.59 3.80 - 5.80 M/uL    Hemoglobin 11.6 11.5 - 16.5 g/dL    Hematocrit 38.4 37.0 - 47.0 %    MCV 83.7 80.0 - 100.0 fL    MCH 25.3 (L) 27.0 - 32.0 pg    MCHC 30.2 (L) 31.0 - 35.0 g/dL    RDW 23.1 (H) 11.0 - 16.0 %    Platelets 969 421 - 479 K/uL    MPV 11.1 (H) 6.0 - 10.0 fL    Neutrophils % 74.1 %    Immature Granulocytes % 0.3 0.0 - 5.0 %    Lymphocytes % 16.6 %    Monocytes % 7.5 %    Eosinophils % 1.1 %    Basophils % 0.4 %    Neutrophils # 7.3 2.0 - 7.5 K/uL    Immature Granulocytes # 0.0 K/uL    Lymphocytes # 1.6 1.5 - 4.0 K/uL    Monocytes # 0.7 0.2 - 0.8 K/uL    Eosinophils # 0.1 0.0 - 0.4 K/uL    Basophils # 0.0 0.0 - 0.1 K/uL   Comprehensive Metabolic Panel w/ Reflex to MG   Result Value Ref Range    Sodium 137 136 - 145 mmol/L    Potassium reflex Magnesium 6.0 (H) 3.4 - 5.1 mmol/L    Chloride 106 98 - 107 mmol/L    CO2 17 (L) 20 - 30 mmol/L    Anion Gap 14 3 - 16    Glucose 247 (H) 74 - 106 mg/dL    BUN 52 (H) 6 - 20 mg/dL    CREATININE 1.8 (H) 0.4 - 1.2 mg/dL    GFR Non-African American 29 (L) >59    GFR  35 (L) >59    Calcium 10.1 8.5 - 10.5 mg/dL    Total Protein 7.7 6.4 - 8.3 g/dL    Alb 4.0 3.4 - 4.8 g/dL    Albumin/Globulin Ratio 1.1 0.8 - 2.0    Total Bilirubin 0.7 0.3 - 1.2 mg/dL    Alkaline Phosphatase 169 (H) 25 - 100 U/L    ALT 14 4 - 36 U/L    AST 13 8 - 33 U/L    Globulin 3.7 g/dL   Urinalysis Reflex to Culture   Result Value Ref Range    Color, UA Yellow Straw/Yellow    Clarity, UA Clear Clear    Glucose, Ur Negative Negative mg/dL    Bilirubin Urine Negative Negative    Ketones, Urine Negative Negative mg/dL    Specific Gravity, UA 1.025 1.005 - 1.030    Blood, Urine TRACE-LYSED (A) Negative    pH, UA 5.5 5.0 - 8.0    Protein, UA >=300 (A) Negative mg/dL    Urobilinogen, Urine 0.2 <2.0 E.U./dL    Nitrite, Urine Negative Negative    Leukocyte Esterase, Urine Negative Negative    Microscopic Examination YES     Urine Reflex to Culture Not Indicated     Urine Type Catheter    Lipase   Result Value Ref Range    Lipase 26.0 5.6 - 51.3 U/L   Troponin   Result Value Ref Range    Troponin <0.30 <0.30 ng/mL   Microscopic Urinalysis   Result Value Ref Range    Mucus, UA Rare (A) /LPF    WBC, UA 3-5 0 - 5 /HPF    RBC, UA 0-2 0 - 2 /HPF    Epi Cells 3-5 /HPF        All other labs were within normal range or not returned as of this dictation.     EMERGENCY DEPARTMENT COURSE and DIFFERENTIAL DIAGNOSIS/MDM:   Vitals:    Vitals:    05/29/19 1158 05/29/19 1245 05/29/19 1300 05/29/19 1330   BP: (!) 164/103 (!) 138/103 (!) 134/98 (!) 142/93   Pulse: 116 112 116 116   Resp: 20   16   Temp: 97.4 °F (36.3 °C)      TempSrc: Oral      SpO2: 96% 96% 96% 98% Weight: 255 lb (115.7 kg)      Height: 5' 9\" (1.753 m)          MEDICATIONS ADMINISTERED IN ED:  Medications   ondansetron (ZOFRAN) injection 4 mg (4 mg Intravenous Given 5/29/19 1219)   0.9 % sodium chloride bolus (1,000 mLs Intravenous New Bag 5/29/19 1219)   sodium polystyrene (KAYEXALATE) 15 GM/60ML suspension 30 g (15 g Oral Given 5/29/19 1321)   acetaminophen (TYLENOL) tablet 1,000 mg (1,000 mg Oral Given 5/29/19 1320)       Patient with nausea and vomiting since yesterday after transport home from a local nursing facility. On arrival she is alert and oriented, she is tachycardic afebrile upon arrival.  Alert and oriented. We did obtain IV, labs, patient was given symptomatic therapy/Zofran. With her history of multiple chronic comorbidities IV, labs, EKG/troponin obtained. Hemoglobin stable, potassium 6.0, creatinine 1.8, patient has underlying history of chronic renal insufficiency and hyperkalemia issues. Troponin and lipase normal.  She was given Kayexalate, IV fluids. I discussed these results with the patient, we discussed coming to the hospital for electrolyte checks, Kayexalate and reevaluation for possible rehab/nursing facility to feel the patient is going to have issues perform her activities at home. The patient will follow-up with their PCP in 1-2 days for reevaluation. If the patient or family members have anyfurther concerns or any worsening symptoms they will return to the ED for reevaluation. CONSULTS:  None    PROCEDURES:  Procedures    CRITICAL CARE TIME    Total Critical Care time was 0 minutes, excluding separately reportable procedures. There was a high probability of clinically significant/life threatening deterioration in the patient's condition which required my urgent intervention. FINAL IMPRESSION      1. Non-intractable vomiting with nausea, unspecified vomiting type    2. Chronic renal impairment, unspecified CKD stage    3. Hyperkalemia    4.  Venous stasis of both lower extremities          DISPOSITION/PLAN   DISPOSITION        PATIENT REFERRED TO:  No follow-up provider specified. DISCHARGE MEDICATIONS:  New Prescriptions    No medications on file       Comment: Please note this report has been produced using speech recognition software and may contain errorsrelated to that system including errors in grammar, punctuation, and spelling, as well as words and phrases that may be inappropriate. If there are any questions or concerns please feel free to contact the dictating providerfor clarification.     Dorian Bhandari DO  Attending Emergency Physician              Dorian Bhandari DO  05/29/19 6071

## 2019-05-29 NOTE — ED NOTES
Patient incontinent of a large amount urine at this time, ana care given and complete linen change, patients adult diaper changed at this time with assist x2, patient tolerated well.      Gilberto Pizano RN  05/29/19 8323

## 2019-05-30 ENCOUNTER — TELEPHONE (OUTPATIENT)
Dept: SURGERY | Facility: CLINIC | Age: 59
End: 2019-05-30

## 2019-05-30 ENCOUNTER — APPOINTMENT (OUTPATIENT)
Dept: GENERAL RADIOLOGY | Facility: HOSPITAL | Age: 59
DRG: 641 | End: 2019-05-30
Payer: MEDICARE

## 2019-05-30 PROBLEM — R11.2 NAUSEA AND VOMITING: Status: ACTIVE | Noted: 2019-05-30

## 2019-05-30 LAB
ANION GAP SERPL CALCULATED.3IONS-SCNC: 12 MMOL/L (ref 3–16)
BUN BLDV-MCNC: 50 MG/DL (ref 6–20)
CALCIUM SERPL-MCNC: 9.5 MG/DL (ref 8.5–10.5)
CHLORIDE BLD-SCNC: 110 MMOL/L (ref 98–107)
CO2: 18 MMOL/L (ref 20–30)
CREAT SERPL-MCNC: 1.6 MG/DL (ref 0.4–1.2)
GFR AFRICAN AMERICAN: 40
GFR NON-AFRICAN AMERICAN: 33
GLUCOSE BLD-MCNC: 166 MG/DL (ref 74–106)
GLUCOSE BLD-MCNC: 175 MG/DL (ref 74–106)
GLUCOSE BLD-MCNC: 190 MG/DL (ref 74–106)
GLUCOSE BLD-MCNC: 197 MG/DL (ref 74–106)
GLUCOSE BLD-MCNC: 217 MG/DL (ref 74–106)
HCT VFR BLD CALC: 37.2 % (ref 37–47)
HEMOGLOBIN: 11.1 G/DL (ref 11.5–16.5)
MCH RBC QN AUTO: 25.3 PG (ref 27–32)
MCHC RBC AUTO-ENTMCNC: 29.8 G/DL (ref 31–35)
MCV RBC AUTO: 84.9 FL (ref 80–100)
PDW BLD-RTO: 22.8 % (ref 11–16)
PERFORMED ON: ABNORMAL
PLATELET # BLD: 186 K/UL (ref 150–400)
PMV BLD AUTO: 10.6 FL (ref 6–10)
POTASSIUM REFLEX MAGNESIUM: 5.5 MMOL/L (ref 3.4–5.1)
RBC # BLD: 4.38 M/UL (ref 3.8–5.8)
SODIUM BLD-SCNC: 140 MMOL/L (ref 136–145)
WBC # BLD: 7.4 K/UL (ref 4–11)

## 2019-05-30 PROCEDURE — 97166 OT EVAL MOD COMPLEX 45 MIN: CPT

## 2019-05-30 PROCEDURE — 6360000002 HC RX W HCPCS: Performed by: NURSE PRACTITIONER

## 2019-05-30 PROCEDURE — 97162 PT EVAL MOD COMPLEX 30 MIN: CPT

## 2019-05-30 PROCEDURE — 94760 N-INVAS EAR/PLS OXIMETRY 1: CPT

## 2019-05-30 PROCEDURE — 1200000000 HC SEMI PRIVATE

## 2019-05-30 PROCEDURE — 97530 THERAPEUTIC ACTIVITIES: CPT

## 2019-05-30 PROCEDURE — 36415 COLL VENOUS BLD VENIPUNCTURE: CPT

## 2019-05-30 PROCEDURE — 97802 MEDICAL NUTRITION INDIV IN: CPT

## 2019-05-30 PROCEDURE — 2580000003 HC RX 258: Performed by: NURSE PRACTITIONER

## 2019-05-30 PROCEDURE — 85027 COMPLETE CBC AUTOMATED: CPT

## 2019-05-30 PROCEDURE — 99222 1ST HOSP IP/OBS MODERATE 55: CPT | Performed by: INTERNAL MEDICINE

## 2019-05-30 PROCEDURE — 80048 BASIC METABOLIC PNL TOTAL CA: CPT

## 2019-05-30 PROCEDURE — 6370000000 HC RX 637 (ALT 250 FOR IP): Performed by: NURSE PRACTITIONER

## 2019-05-30 PROCEDURE — 74018 RADEX ABDOMEN 1 VIEW: CPT

## 2019-05-30 RX ORDER — SODIUM POLYSTYRENE SULFONATE 15 G/60ML
15 SUSPENSION ORAL; RECTAL ONCE
Status: COMPLETED | OUTPATIENT
Start: 2019-05-30 | End: 2019-05-30

## 2019-05-30 RX ORDER — SODIUM CHLORIDE 9 MG/ML
INJECTION, SOLUTION INTRAVENOUS CONTINUOUS
Status: ACTIVE | OUTPATIENT
Start: 2019-05-30 | End: 2019-05-31

## 2019-05-30 RX ORDER — 0.9 % SODIUM CHLORIDE 0.9 %
250 INTRAVENOUS SOLUTION INTRAVENOUS ONCE
Status: DISCONTINUED | OUTPATIENT
Start: 2019-05-30 | End: 2019-05-31 | Stop reason: HOSPADM

## 2019-05-30 RX ORDER — HYDRALAZINE HYDROCHLORIDE 25 MG/1
25 TABLET, FILM COATED ORAL 2 TIMES DAILY
Status: DISCONTINUED | OUTPATIENT
Start: 2019-05-30 | End: 2019-05-31 | Stop reason: HOSPADM

## 2019-05-30 RX ADMIN — DOCUSATE SODIUM 100 MG: 100 CAPSULE, LIQUID FILLED ORAL at 22:31

## 2019-05-30 RX ADMIN — INSULIN LISPRO 1 UNITS: 100 INJECTION, SOLUTION INTRAVENOUS; SUBCUTANEOUS at 18:42

## 2019-05-30 RX ADMIN — FERROUS SULFATE TAB EC 324 MG (65 MG FE EQUIVALENT) 324 MG: 324 (65 FE) TABLET DELAYED RESPONSE at 22:32

## 2019-05-30 RX ADMIN — POLYETHYLENE GLYCOL 3350 17 G: 17 POWDER, FOR SOLUTION ORAL at 22:31

## 2019-05-30 RX ADMIN — ONDANSETRON 4 MG: 2 INJECTION INTRAMUSCULAR; INTRAVENOUS at 12:23

## 2019-05-30 RX ADMIN — PANTOPRAZOLE SODIUM 40 MG: 40 TABLET, DELAYED RELEASE ORAL at 09:28

## 2019-05-30 RX ADMIN — HYDRALAZINE HYDROCHLORIDE 25 MG: 25 TABLET, FILM COATED ORAL at 22:33

## 2019-05-30 RX ADMIN — MULTIPLE VITAMINS W/ MINERALS TAB 1 TABLET: TAB at 09:28

## 2019-05-30 RX ADMIN — POLYETHYLENE GLYCOL 3350 17 G: 17 POWDER, FOR SOLUTION ORAL at 09:36

## 2019-05-30 RX ADMIN — INSULIN LISPRO 1 UNITS: 100 INJECTION, SOLUTION INTRAVENOUS; SUBCUTANEOUS at 22:33

## 2019-05-30 RX ADMIN — SIMVASTATIN 20 MG: 20 TABLET, FILM COATED ORAL at 22:32

## 2019-05-30 RX ADMIN — ENOXAPARIN SODIUM 40 MG: 40 INJECTION SUBCUTANEOUS at 09:28

## 2019-05-30 RX ADMIN — LEVOTHYROXINE SODIUM 150 MCG: 125 TABLET ORAL at 06:03

## 2019-05-30 RX ADMIN — DILTIAZEM HYDROCHLORIDE 30 MG: 30 TABLET, FILM COATED ORAL at 15:53

## 2019-05-30 RX ADMIN — INSULIN LISPRO 1 UNITS: 100 INJECTION, SOLUTION INTRAVENOUS; SUBCUTANEOUS at 09:29

## 2019-05-30 RX ADMIN — FERROUS SULFATE TAB EC 324 MG (65 MG FE EQUIVALENT) 324 MG: 324 (65 FE) TABLET DELAYED RESPONSE at 09:28

## 2019-05-30 RX ADMIN — METOPROLOL TARTRATE 100 MG: 50 TABLET ORAL at 22:32

## 2019-05-30 RX ADMIN — ASPIRIN 81 MG 81 MG: 81 TABLET ORAL at 09:28

## 2019-05-30 RX ADMIN — MICONAZOLE NITRATE: 20 CREAM TOPICAL at 09:35

## 2019-05-30 RX ADMIN — GABAPENTIN 300 MG: 300 CAPSULE ORAL at 22:32

## 2019-05-30 RX ADMIN — INSULIN LISPRO 2 UNITS: 100 INJECTION, SOLUTION INTRAVENOUS; SUBCUTANEOUS at 11:11

## 2019-05-30 RX ADMIN — HYDRALAZINE HYDROCHLORIDE 10 MG: 10 TABLET, FILM COATED ORAL at 09:28

## 2019-05-30 RX ADMIN — SODIUM CHLORIDE: 9 INJECTION, SOLUTION INTRAVENOUS at 12:23

## 2019-05-30 RX ADMIN — ZINC SULFATE 220 MG (50 MG) CAPSULE 220 MG: CAPSULE at 22:33

## 2019-05-30 RX ADMIN — SODIUM CHLORIDE: 9 INJECTION, SOLUTION INTRAVENOUS at 21:12

## 2019-05-30 RX ADMIN — DOCUSATE SODIUM 100 MG: 100 CAPSULE, LIQUID FILLED ORAL at 09:28

## 2019-05-30 RX ADMIN — Medication 1 CAPSULE: at 22:31

## 2019-05-30 RX ADMIN — DILTIAZEM HYDROCHLORIDE 30 MG: 30 TABLET, FILM COATED ORAL at 22:32

## 2019-05-30 RX ADMIN — ISOSORBIDE MONONITRATE 30 MG: 30 TABLET, EXTENDED RELEASE ORAL at 09:28

## 2019-05-30 RX ADMIN — METOPROLOL TARTRATE 100 MG: 50 TABLET ORAL at 09:28

## 2019-05-30 RX ADMIN — Medication 1 CAPSULE: at 09:28

## 2019-05-30 RX ADMIN — GABAPENTIN 300 MG: 300 CAPSULE ORAL at 09:28

## 2019-05-30 RX ADMIN — ZINC SULFATE 220 MG (50 MG) CAPSULE 220 MG: CAPSULE at 09:28

## 2019-05-30 RX ADMIN — SODIUM POLYSTYRENE SULFONATE 15 G: 15 SUSPENSION ORAL; RECTAL at 12:23

## 2019-05-30 NOTE — CARE COORDINATION
05/30/19 1646   Discharge [facility] Family Members;Home Care Staff   Current Services Prior To Admission Durable Medical Equipment;Home Care   DME [place] ; Wheelchair;Bedside Commode  (RW, standard walker, zaira lift)   [facility]   ([facility])   Potential [address] Medications No   Type of Home Care Services OT;PT;Nursing Services   Patient expects to be discharged to: home with family   Expected Discharge Date 06/02/19   Follow Up Appointment: Best Day/Time    (any)

## 2019-05-30 NOTE — CONSULTS
Inpatient consult to Dietitian  Consult performed by: Louie Manzo  Consult ordered by: John Mcmahon MD  Assessment/Recommendations: Nutrition Assessment    Type and Reason for Visit: Initial, Consult, Patient Education    Nutrition Recommendations: Encourage compliance with therapeutic diet and will monitor intakes and make adjustments and recommendations as appropriate. Nutrition Assessment: Patient  nutritionally compromise AEB obesity d/t excessive energy intake. At risk for ongoing compromise d/t non compliance with therapeutic diet. Will offer diet counseling. Malnutrition Assessment:  Malnutrition Status: No malnutrition  Context: Chronic illness  Findings of the 6 clinical characteristics of malnutrition (Minimum of 2 out of 6 clinical characteristics is required to make the diagnosis of moderate or severe Protein Calorie Malnutrition based on AND/ASPEN Guidelines):  Energy Intake- ,      Weight Loss-No significant weight loss,    Fat Loss-No significant subcutaneous fat loss,    Muscle Loss-No significant muscle mass loss,    Fluid Accumulation-No significant fluid accumulation,     Strength-Not measured    Nutrition Risk Level: Moderate    Nutrient Needs:  Estimated Daily Total Kcal: 8570-5329(1.2-1.3 for activity factor subtract 250-500 kcal for safe weight loss)  Estimated Daily Protein (g): 58(0.8 gm/kg std body wt.)  Estimated Daily Total Fluid (ml/day): 1000 ml plus output; MD has place pt on 2000 ml fluid restricition    Nutrition Diagnosis:   Problem: Food and nutrition-related knowledge deficit, Overweight/Obese  Etiology: related to Renal dysfunction, Endocrine dysfunction    Signs and symptoms:  as evidenced by BMI, Lab values(elevated potassium and blood glucose levels)    Objective Information:  Nutrition-Focused Physical Findings: No recent weight loss, no apparent fat loss or muscle wasting. No edema reported. Patient is obese and and does have skin tears and redness.   Wound Type:    Current Nutrition Therapies:  Oral Diet Orders: Carb Control 4 Carbs/Meal, Low Potassium, 2gm Sodium, Fluid Restriction   Oral Diet intake: %(observed patient's lunch, ate most everything off main entree.)  Oral Nutrition Supplement (ONS) Orders: None  ONS intake:    Anthropometric Measures:  Ht: 5' 9\" (175.3 cm)   Current Body Wt: 255 lb (115.7 kg)  Admission Body Wt:    Usual Body Wt:    % Weight Change:  ,     Ideal Body Wt: 145 lb (65.8 kg), % Ideal Body 176  Adjusted Body Wt:  , body weight adjusted for    BMI Classification: BMI 35.0 - 39.9 Obese Class II(37.7)    Nutrition Interventions:   Continue current diet  Continued Inpatient Monitoring, Education Needed, Education Initiated, Education Completed, Coordination of Care(Patient did have questions on foods that she eats quite often, green tomatoes, potatoes, beans and cornbread. Left written info with contact info for further quesitions.)    Nutrition Evaluation:   Evaluation: Goals set   Goals: to meet est needs, while promoting healthier choices to help improve labs.     Monitoring: Meal Intake, Skin Integrity, Weight, Pertinent Labs, Patient/Family Education      Electronically signed by Yola Rosales on 5/30/19 at 1:54 PM

## 2019-05-30 NOTE — PROGRESS NOTES
Pt requires a lift at home due to inability to stand erect and/or transfer safely. Pt also requires a WC for home use as a cane/walker has been ruled out due to safety. Pt does have someone that can assist in propelling WC if she is at anytime unable to self-propel. A WC is required at home to assist with self-completing ADLs.

## 2019-05-30 NOTE — FLOWSHEET NOTE
05/29/19 2042   Assessment   Charting Type Shift assessment   Neurological   Neuro (WDL) WDL   Level of Consciousness 0   [place] Coma Scale   Eye Opening 4   Best Verbal Response 5   Best Motor Response 6   Myke Coma Scale Score 15   NIH/MNHISS Stroke Scale   NIH/MNIHSS Stroke Scale Assessed No   HEENT   HEENT (WDL) X   Right Eye Impaired vision   Left Eye Impaired vision   Teeth Dentures upper;Dentures lower  (doesn't wear the dentures . )   Respiratory   Respiratory (WDL) X   Respiratory Pattern Regular   Respiratory Depth Normal   Respiratory Quality/Effort Unlabored   Chest Assessment Chest expansion symmetrical   L Breath Sounds Diminished   R Breath Sounds Diminished   Breath Sounds   Right Upper Lobe Clear   Right Middle Lobe Diminished   Right Lower Lobe Diminished   Left Upper Lobe Clear   Left Lower Lobe Diminished   Cardiac   Cardiac (WDL) WDL   Gastrointestinal   Abdominal (WDL) X   GI Symptoms Nausea   Abdomen Inspection Soft   RUQ Bowel Sounds Active   LUQ Bowel Sounds Active   RLQ Bowel Sounds Active   LLQ Bowel Sounds Active   Peripheral Vascular   Peripheral Vascular (WDL) X   RLE Neurovascular Assessment   Capillary Refill Less than/equal to 3 seconds   Color Other (Comment)  (brown reddened discolored rasied crusty skin BLE )   Temperature Warm   R Pedal Pulse +1   LLE Neurovascular Assessment   Capillary Refill Less than/equal to 3 seconds   Color Other (Comment)  (reddened brown crusty raised areas BLE)   Skin Color/Condition   Skin Color/Condition (WDL) X   Skin Color Pink   Skin Condition/Temp Warm;Dry   Skin Integrity   Skin Integrity (WDL) X   Skin Integrity Redness   Location Buttocks, breast folds   Skin Fold Management No   Multiple Skin Integrity Sites Yes   Dressing Site Breasts   Skin Integrity Site 3   Skin Integrity Location 3   (scab)    Location 3 abd   Musculoskeletal   Musculoskeletal (WDL) X   RUE Weakness   LUE Weakness   RL Extremity Weakness   LL Extremity Weakness Genitourinary   Genitourinary (WDL) WDL   Urine Assessment   Urine Color Yellow/straw   Urine Appearance Clear   Urine Odor No odor   Incontinence Yes   Bladder Scan Volume (mL) 0 mL   Psychosocial   Psychosocial (WDL) X   Patient Behaviors Calm;Non-Compliant     Pt in bed, asleep. Pt awakened, and told that it is time for medication. Pt immediately went back to sleep, and had to be awakened again when medications were ready. Pt denies pain or discomfort at this time, and was able to take medications with no difficulty. Pt legs scaly and discolored.

## 2019-05-30 NOTE — PROGRESS NOTES
Occupational Therapy   Occupational Therapy Initial Assessment  Date: 2019   Patient Name: Leonor Georges  MRN: [id number]     : 1960    Date of Service: 2019    Discharge Recommendations:  [address], 24 hour supervision or assist  OT Equipment Recommendations  Other: WC    Assessment   Performance deficits / Impairments: Decreased functional mobility ; Decreased strength;Decreased endurance;Decreased ADL status; Decreased balance;Decreased high-level IADLs  Assessment: Pt agreeable to OT services. Pt referred to OT secondary to difficulty with mobility and self care. Pt currently requires DEP (use of lift) to complete transfers. Pt unable to complete erect standing position secondary to pain in BLE knees. Pt has been non ambulatory and will need a WC for mobility. Pt will benefit from 24 hour care or LTC placement. Pt will benefit from skilled OT services to improve independence with mobility to decrease burden of care. Prognosis: Good  Decision Making: Medium Complexity  REQUIRES OT FOLLOW UP: Yes  Activity Tolerance  Activity Tolerance: Patient limited by pain; Patient limited by fatigue           Patient Diagnosis(es): The primary encounter diagnosis was Non-intractable vomiting with nausea, unspecified vomiting type. Diagnoses of Chronic renal impairment, unspecified CKD stage, Hyperkalemia, and Venous stasis of both lower extremities were also pertinent to this visit. has a past medical history of Acute metabolic encephalopathy, Acute renal failure (ARF) ([address] Anemia, Chronic venous insufficiency, GERD (gastroesophageal reflux disease), Headache(784.0), Hypertension, Hypothyroidism, Thrombocytopenia ([address] Type II or unspecified type diabetes mellitus without mention of complication, not stated as uncontrolled, and Vitamin B12 deficiency.    has a past surgical history that includes Eye surgery; Leg Surgery; pr egd transoral biopsy single/multiple (N/A, 10/5/2018); and pr colonoscopy flx dx w/collj spec when pfrmd (N/A, 10/5/2018). Restrictions  Restrictions/Precautions  Restrictions/Precautions: Fall Risk, General Precautions    Subjective   General  Chart Reviewed: Yes  Patient assessed for rehabilitation services?: Yes  Family / Caregiver Present: No  Referring Practitioner: JOSE Armstrong  Diagnosis: Nausea and vomiting  Subjective  Subjective: Pt states she was throwing up everything and couldnt keep it down. Pt agreeable to OT services. Height and Weight  Height: 5' 9\" (175.3 cm)  Social/Functional History  Social/Functional History  Lives With: (Sister)  Type of Home: House  Home Layout: One level(1 step to enter BR)  Home Access: Level entry  Bathroom Shower/Tub: (sponge bathes)  Home Equipment: Rolling walker, Standard walker  ADL Assistance: Needs assistance  Homemaking Assistance: Needs assistance  Homemaking Responsibilities: No  Ambulation Assistance: Needs assistance  Transfer Assistance: Needs assistance  Active : No  Additional Comments: Pt has been nonambulatory for a couple months unable to recall exactly how long. Pt states she had been completing squat pivot transfer from [facility] to [facility]. Objective   Vision: Within Functional Limits  Hearing: Within functional limits    Orientation  Overall Orientation Status: Within Functional Limits  Observation/Palpation  Posture: Fair  Observation: pt presents supine in bed, NAD. Obese. Scaly areas at LLE.   Large scab region at medial R calcaneus from wound   Balance  Sitting Balance: Independent  Standing Balance: Dependent/Total  ADL  LE Dressing: Maximum assistance  Toileting: Dependent/Total        Bed mobility  Rolling to Right: Minimal assistance  Supine to Sit: Moderate assistance  Sit to Supine: Maximum assistance  Scooting: Contact guard assistance  Transfers  Sit to stand: Dependent/Total(unable to come to full erect standing position)                       LUE AROM (degrees)  LUE AROM : WFL  RUE AROM (degrees)  RUE AROM : WFL  LUE Strength  L Shoulder Flex: 4-/5  L Elbow Flex: 4/5  L Elbow Ext: 4/5  L Hand Grasp: 4/5  RUE Strength  R Shoulder Flex: 4-/5  R Elbow Flex: 4/5  R Elbow Ext: 4/5  R Hand Grasp: 4/5                   Plan   Plan  Times per week: 3-5  Times per day: Daily  Plan weeks: 1  Current Treatment Recommendations: Strengthening, Balance Training, Endurance Training, Self-Care / ADL, Safety Education & Training, Positioning    Goals  Short term goals  Time Frame for Short term goals: 1 week  Short term goal 1: Pt to complete transfer to and from [facility] for toileting with moderate assist.   Short term goal 2: pt to complete bed mobility with CGA. Short term goal 3: Pt to complete hygiene and grooming with MOD I. Short term goal 4: Pt to tolerate x15 minutes of activity to increase independence with activity. Short term goal 5: Pt to self propel WC x100 feet with SBA. Therapy Time   Individual Concurrent Group Co-treatment   Time In 6108         Time Out 3977         Minutes 23              This note serves as a DC summary in the event of pt discharge.      Dedra Schumacher, OTR/L

## 2019-05-30 NOTE — PLAN OF CARE
Problem: Falls - Risk of:  Goal: Will remain free from falls  Description  Will remain free from falls  5/29/2019 2311 by Canelo Bone RN  Outcome: Ongoing  5/29/2019 1545 by Maximo Bagley RN  Outcome: Ongoing     Problem: Daily Care:  Goal: Daily care needs are met  Description  Daily care needs are met  5/29/2019 2311 by Canelo Bone RN  Outcome: Ongoing     Problem: Pain:  Goal: Patient's pain/discomfort is manageable  Description  Patient's pain/discomfort is manageable  5/29/2019 2311 by Canelo Bone RN  Outcome: Ongoing

## 2019-05-30 NOTE — H&P
History and Physical    Patient:  Sandra Bond    CHIEF COMPLAINT:    Nausea and vomiting    HISTORY OF PRESENT ILLNESS:   The patient is a 59 y.o. female who presented to ED initially complaining of nausea and vomiting that she accredits to motion sickness from being transported home from the nursing home yesterday. Denies trying anything for this issue. Denies fever or chills. Admits to some associated dizziness while on the ride. Patient reportedly feels better now and is tolerating oral intake. Past Medical History:      Diagnosis Date    Acute metabolic encephalopathy     Acute renal failure (ARF) (HCC)     Anemia     Chronic venous insufficiency     GERD (gastroesophageal reflux disease) 12/2013    Severe Reflux Esophagitis and hemorrhagic gastritis    Headache(784.0)     Hypertension     Hypothyroidism     Thrombocytopenia (HCC)     Type II or unspecified type diabetes mellitus without mention of complication, not stated as uncontrolled     Vitamin B12 deficiency        Past Surgical History:      Procedure Laterality Date    EYE SURGERY      LEG SURGERY      [place] COLONOSCOPY FLX DX W/COLLJ SPEC WHEN PFRMD N/A 10/5/2018    COLONOSCOPY DIAGNOSTIC OR SCREENING performed by Manish Hernadez MD at [address] [place] [address] 304 EGD TRANSORAL BIOPSY SINGLE/MULTIPLE N/A 10/5/2018    EGD BIOPSY performed by Manish Hernadez MD at [facility] ENDOSCOPY       Medications Prior to Admission:    Prior to Admission medications    Medication Sig Start Date End Date Taking? Authorizing Provider   gabapentin (NEURONTIN) 300 MG capsule Take 1 capsule by mouth 2 times daily for 2 days. Patient taking differently: Take 300 mg by mouth 2 times daily. Not ordered by nursing home on discharge. Request in Epic to NP for refill.  5/28/19 5/30/19  Garett Bhandari MD   aspirin 81 MG chewable tablet Take 81 mg by mouth daily    Historical Provider, MD   isosorbide mononitrate (ISMO;MONOKET) 10 MG tablet Take 15 mg by mouth daily Historical Provider, MD   hydrALAZINE (APRESOLINE) 10 MG tablet Take 10 mg by mouth 2 times daily    Historical Provider, MD   levothyroxine (SYNTHROID) 100 MCG tablet Take 100 mcg by mouth Daily Patient is taking total of 150 mcg per day. Historical Provider, MD   Wound Dressings ([facility] WOUND/BURN DRESSING) PSTE paste Apply 1 g topically as needed Apply daily and prn to right heel and cover with dry dressing until healed. Historical Provider, MD   Skin Protectants, Misc. (DIMETHICONE-ZINC OXIDE) cream Apply 1 Dose topically as needed for Dry Skin (for redness) Apply topically 2 times daily as needed. Historical Provider, MD   bisoprolol (ZEBETA) 10 MG tablet Take 1 tablet by mouth 2 times daily 4/18/19   JOSE Winchester   cefdinir (OMNICEF) 300 MG capsule Take 1 capsule by mouth 2 times daily 4/18/19   JOSE Winchester   lidocaine (LIDODERM) 5 % Place 1 patch onto the skin daily Apply to left knee every morning and remove every evening (12 hours on, 12 hours off).     Historical Provider, MD   Ferrous Sulfate (IRON) 325 (65 Fe) MG TABS take 1 tablet by mouth twice a day with food 4/4/19   JOSE Man   pantoprazole (PROTONIX) 40 MG tablet Take 40 mg by mouth daily    Historical Provider, MD   docusate sodium (COLACE) 100 MG capsule Take 100 mg by mouth 2 times daily    Historical Provider, MD   saccharomyces boulardii (FLORASTOR) 250 MG capsule Take 250 mg by mouth 2 times daily    Historical Provider, MD   polyethylene glycol (MIRALAX) powder Take 17 g by mouth 2 times daily    Historical Provider, MD   zinc sulfate (ZINCATE) 220 (50 Zn) MG capsule Take 220 mg by mouth 2 times daily    Historical Provider, MD   arginine 500 MG tablet Take 500 mg by mouth daily    Historical Provider, MD   insulin aspart (NOVOLOG) 100 UNIT/ML injection vial Inject into the skin 3 times daily (before meals) Per low dose sliding scale protocol (Patient was receiving Novolog in nursing home, itching  Neurologic:  Denies headache, dizziness or unilateral weakness  Psychiatric:  Denies acute depression or acute anxiety      Vital Signs  Temp: 97.9 °F (36.6 °C)  Pulse: 117  Resp: 18  BP: 137/86  SpO2: 96 %  O2 Device: None (Room air)       vital signs reviewed in electronic chart. Physical exam  Constitutional:  Well developed, obese, no acute distress  Eyes:  PERRL, no scleral icterus, conjunctiva normal   HENT:  Atraumatic, external ears normal, nose normal, oropharynx moist, no pharyngeal exudates. Neck- supple, no JVD, no lymphadenopathy  Respiratory:  No respiratory distress, no wheezing, rales or rhonchi detected  Cardiovascular:  Tachycardic, normal rhythm, no murmurs, no gallops, no rubs, 1-2+ swelling BLE  GI:  Soft, obese, normal bowel sounds, nontender, no voluntary guarding  Musculoskeletal:  No cyanosis or obvious acute deformity. Moving all extremities   Integument:  Warm and dry. Chronic skin changes BLE. Some hyperkeratotic changes noted as well. No active cellulitis. Neurologic:  Alert & oriented x 3, no apparent focal deficits noted   Psychiatric:  Speech and behavior appropriate         Lab Results   Component Value Date     05/30/2019    K 5.5 (H) 05/30/2019     (H) 05/30/2019    CO2 18 (L) 05/30/2019    BUN 50 (H) 05/30/2019    CREATININE 1.6 (H) 05/30/2019    GLUCOSE 197 (H) 05/30/2019    CALCIUM 9.5 05/30/2019    PROT 7.7 05/29/2019    LABALBU 4.0 05/29/2019    BILITOT 0.7 05/29/2019    ALKPHOS 169 (H) 05/29/2019    AST 13 05/29/2019    ALT 14 05/29/2019    LABGLOM 33 (L) 05/30/2019    GFRAA 40 (L) 05/30/2019    AGRATIO 1.1 05/29/2019    GLOB 3.7 05/29/2019           Lab Results   Component Value Date    WBC 7.4 05/30/2019    HGB 11.1 (L) 05/30/2019    HCT 37.2 05/30/2019    MCV 84.9 05/30/2019     05/30/2019       Assessment and Plan     Active Hospital Problems    Diagnosis Date Noted    Nausea and vomiting [R11.2]  Improved. Treat supportively.  Proceed

## 2019-05-30 NOTE — PROGRESS NOTES
Physical Therapy    Facility/Department: [facility] MED SURG  Initial Assessment    NAME: Martín Hurst  : 1960  MRN: [id number]    Date of Service: 2019    Discharge Recommendations:  Continue to assess pending progress, 24 hour supervision or assist        Assessment   Body structures, Functions, Activity limitations: Decreased functional mobility ; Decreased high-level IADLs;Decreased endurance;Decreased ROM; Decreased strength; Increased Pain  Assessment: Pt will benefit from skilled PT to address strength, mobility, and ROM to improve overall functional status. Pt has not been ambulatory for some time per pt reports; plan to progress toward standing at walker to assist with transfers and mobility. Treatment Diagnosis: functional decline  Prognosis: Fair  Decision Making: Medium Complexity  REQUIRES PT FOLLOW UP: Yes  Activity Tolerance  Activity Tolerance: Patient limited by pain  Activity Tolerance: Pt tolerated sitting EOB for a short period but then had to lay down due to reports of L knee pain. Patient Diagnosis(es): The primary encounter diagnosis was Non-intractable vomiting with nausea, unspecified vomiting type. Diagnoses of Chronic renal impairment, unspecified CKD stage, Hyperkalemia, and Venous stasis of both lower extremities were also pertinent to this visit. has a past medical history of Acute metabolic encephalopathy, Acute renal failure (ARF) ([address] Anemia, Chronic venous insufficiency, GERD (gastroesophageal reflux disease), Headache(784.0), Hypertension, Hypothyroidism, Thrombocytopenia ([address] Type II or unspecified type diabetes mellitus without mention of complication, not stated as uncontrolled, and Vitamin B12 deficiency.    has a past surgical history that includes Eye surgery; Leg Surgery; pr egd transoral biopsy single/multiple (N/A, 10/5/2018); and pr colonoscopy flx dx w/collj spec when pfrmd (N/A, 10/5/2018). Restrictions  Restrictions/Precautions  Restrictions/Precautions: Fall Risk    Vision/Hearing  Vision: Within Functional Limits  Hearing: Within functional limits       Subjective  General  Chart Reviewed: Yes  Patient assessed for rehabilitation services?: Yes  Referring Practitioner: JOSE Watkins  Referral Date : 05/29/19  Subjective  Subjective: Pt presents supine in bed, agreeable to PT alon. Pt states she was in the nursing home due a wound on her R foot. She states the wound is healed and went home. She states that she has not been walking yet - states she had nausea and dizziness that caused her to be at the hospital.   Pain Screening  Patient Currently in Pain: No  Vital Signs  Patient Currently in Pain: No       Orientation  Orientation  Overall Orientation Status: Within Normal Limits     Social/Functional History  Social/Functional History  Lives With: (lives with sister)  Type of Home: House  Home Layout: One level(one step to enter the bedroom)  Home Access: Level entry  Bathroom Shower/Tub: (shower is broken currently, been doing sponge baths)  Home Equipment: Rolling walker, Standard walker  Ambulation Assistance: Needs assistance(pt states she has not been walking due to having a wound on her foot - wound now healed but hasn't been walking at home)  Transfer Assistance: Needs assistance  Active : No    Objective     Observation/Palpation  Observation: pt presents supine in bed, NAD. Obese. Scaly areas at LLE.   Large scab region at medial R calcaneus from wound     AROM LLE (degrees)  LLE AROM : Exceptions  LLE General AROM: limited L knee AROM due to knee pain / OA     Strength RLE  Strength RLE: WFL  Strength LLE  Strength LLE: Exception  L Knee Flexion: 4-/5  L Knee Extension: 2+/5(due to pain)        Bed mobility  Rolling to Left: Moderate assistance  Rolling to Right: Moderate assistance  Supine to Sit: Moderate assistance  Sit to Supine: Minimal assistance  Scooting: Moderate assistance     Transfers  Sit to Stand: Unable to assess  Stand to sit: Unable to assess  Bed to Chair: Unable to assess     Ambulation  Ambulation?: No     Balance  Sitting - Static: Good  Sitting - Dynamic: Good        Plan   Plan  Times per week: 4-5 days per week  Times per day: Daily  Current Treatment Recommendations: Strengthening, Gait Training, Patient/Caregiver Education & Training, Equipment Evaluation, Education, & procurement, ROM, Balance Training, Home Exercise Program, Functional Mobility Training, Safety Education & Training, Transfer Training  Safety Devices  Type of devices: Call light within reach, Left in bed    Goals  Short term goals  Time Frame for Short term goals: 3-5 days  Short term goal 1: Pt to be able to sit EOB x 20 min for functional activity with good tolerance. Short term goal 2: Pt to be able to move sit to stand x Mod A x 2 at a walker to attempt weight bearing. Short term goal 3: Pt to move supine to sit x Min A or less. Short term goal 4: Pt to be able to roll B x Min A. Therapy Time   Individual Concurrent Group Co-treatment   Time In 0946         Time Out 1012         Minutes [facility],      This note serves as D/C summary if patient is discharged prior to next visit.

## 2019-05-31 VITALS
BODY MASS INDEX: 37.77 KG/M2 | SYSTOLIC BLOOD PRESSURE: 159 MMHG | HEIGHT: 69 IN | TEMPERATURE: 98.8 F | RESPIRATION RATE: 18 BRPM | OXYGEN SATURATION: 93 % | HEART RATE: 122 BPM | DIASTOLIC BLOOD PRESSURE: 107 MMHG | WEIGHT: 255 LBS

## 2019-05-31 LAB
ANION GAP SERPL CALCULATED.3IONS-SCNC: 11 MMOL/L (ref 3–16)
BUN BLDV-MCNC: 42 MG/DL (ref 6–20)
CALCIUM SERPL-MCNC: 8.8 MG/DL (ref 8.5–10.5)
CHLORIDE BLD-SCNC: 110 MMOL/L (ref 98–107)
CO2: 19 MMOL/L (ref 20–30)
CREAT SERPL-MCNC: 1.6 MG/DL (ref 0.4–1.2)
GFR AFRICAN AMERICAN: 40
GFR NON-AFRICAN AMERICAN: 33
GLUCOSE BLD-MCNC: 210 MG/DL (ref 74–106)
GLUCOSE BLD-MCNC: 221 MG/DL (ref 74–106)
GLUCOSE BLD-MCNC: 230 MG/DL (ref 74–106)
HCT VFR BLD CALC: 35.4 % (ref 37–47)
HEMOGLOBIN: 10.6 G/DL (ref 11.5–16.5)
MCH RBC QN AUTO: 25.4 PG (ref 27–32)
MCHC RBC AUTO-ENTMCNC: 29.9 G/DL (ref 31–35)
MCV RBC AUTO: 84.9 FL (ref 80–100)
PDW BLD-RTO: 22.6 % (ref 11–16)
PERFORMED ON: ABNORMAL
PERFORMED ON: ABNORMAL
PLATELET # BLD: 166 K/UL (ref 150–400)
PMV BLD AUTO: 10.7 FL (ref 6–10)
POTASSIUM SERPL-SCNC: 5 MMOL/L (ref 3.4–5.1)
RBC # BLD: 4.17 M/UL (ref 3.8–5.8)
SODIUM BLD-SCNC: 140 MMOL/L (ref 136–145)
WBC # BLD: 7.7 K/UL (ref 4–11)

## 2019-05-31 PROCEDURE — 6360000002 HC RX W HCPCS: Performed by: NURSE PRACTITIONER

## 2019-05-31 PROCEDURE — 2580000003 HC RX 258: Performed by: NURSE PRACTITIONER

## 2019-05-31 PROCEDURE — 36415 COLL VENOUS BLD VENIPUNCTURE: CPT

## 2019-05-31 PROCEDURE — 85027 COMPLETE CBC AUTOMATED: CPT

## 2019-05-31 PROCEDURE — 97803 MED NUTRITION INDIV SUBSEQ: CPT

## 2019-05-31 PROCEDURE — 99238 HOSP IP/OBS DSCHRG MGMT 30/<: CPT | Performed by: INTERNAL MEDICINE

## 2019-05-31 PROCEDURE — 93005 ELECTROCARDIOGRAM TRACING: CPT

## 2019-05-31 PROCEDURE — 80048 BASIC METABOLIC PNL TOTAL CA: CPT

## 2019-05-31 PROCEDURE — 6370000000 HC RX 637 (ALT 250 FOR IP): Performed by: NURSE PRACTITIONER

## 2019-05-31 PROCEDURE — 94760 N-INVAS EAR/PLS OXIMETRY 1: CPT

## 2019-05-31 RX ORDER — HYDRALAZINE HYDROCHLORIDE 25 MG/1
25 TABLET, FILM COATED ORAL 2 TIMES DAILY
Qty: 60 TABLET | Refills: 0 | Status: ON HOLD | OUTPATIENT
Start: 2019-05-31 | End: 2019-06-06 | Stop reason: HOSPADM

## 2019-05-31 RX ORDER — DILTIAZEM HYDROCHLORIDE 180 MG/1
180 CAPSULE, COATED, EXTENDED RELEASE ORAL DAILY
Status: DISCONTINUED | OUTPATIENT
Start: 2019-05-31 | End: 2019-05-31 | Stop reason: HOSPADM

## 2019-05-31 RX ORDER — DILTIAZEM HYDROCHLORIDE 180 MG/1
180 CAPSULE, COATED, EXTENDED RELEASE ORAL DAILY
Qty: 30 CAPSULE | Refills: 0 | Status: ON HOLD | OUTPATIENT
Start: 2019-05-31 | End: 2019-06-06 | Stop reason: HOSPADM

## 2019-05-31 RX ADMIN — INSULIN LISPRO 2 UNITS: 100 INJECTION, SOLUTION INTRAVENOUS; SUBCUTANEOUS at 08:58

## 2019-05-31 RX ADMIN — DILTIAZEM HYDROCHLORIDE 180 MG: 180 CAPSULE, COATED, EXTENDED RELEASE ORAL at 16:21

## 2019-05-31 RX ADMIN — PANTOPRAZOLE SODIUM 40 MG: 40 TABLET, DELAYED RELEASE ORAL at 08:53

## 2019-05-31 RX ADMIN — MICONAZOLE NITRATE: 20 CREAM TOPICAL at 08:53

## 2019-05-31 RX ADMIN — INSULIN LISPRO 2 UNITS: 100 INJECTION, SOLUTION INTRAVENOUS; SUBCUTANEOUS at 11:25

## 2019-05-31 RX ADMIN — POLYETHYLENE GLYCOL 3350 17 G: 17 POWDER, FOR SOLUTION ORAL at 08:52

## 2019-05-31 RX ADMIN — GABAPENTIN 300 MG: 300 CAPSULE ORAL at 08:53

## 2019-05-31 RX ADMIN — ENOXAPARIN SODIUM 40 MG: 40 INJECTION SUBCUTANEOUS at 08:53

## 2019-05-31 RX ADMIN — MULTIPLE VITAMINS W/ MINERALS TAB 1 TABLET: TAB at 08:53

## 2019-05-31 RX ADMIN — METOPROLOL TARTRATE 100 MG: 50 TABLET ORAL at 08:52

## 2019-05-31 RX ADMIN — ZINC SULFATE 220 MG (50 MG) CAPSULE 220 MG: CAPSULE at 08:53

## 2019-05-31 RX ADMIN — DILTIAZEM HYDROCHLORIDE 30 MG: 30 TABLET, FILM COATED ORAL at 05:50

## 2019-05-31 RX ADMIN — Medication 1 CAPSULE: at 08:53

## 2019-05-31 RX ADMIN — LEVOTHYROXINE SODIUM 150 MCG: 125 TABLET ORAL at 05:50

## 2019-05-31 RX ADMIN — DOCUSATE SODIUM 100 MG: 100 CAPSULE, LIQUID FILLED ORAL at 08:53

## 2019-05-31 RX ADMIN — DILTIAZEM HYDROCHLORIDE 30 MG: 30 TABLET, FILM COATED ORAL at 11:54

## 2019-05-31 RX ADMIN — HYDRALAZINE HYDROCHLORIDE 25 MG: 25 TABLET, FILM COATED ORAL at 08:53

## 2019-05-31 RX ADMIN — ASPIRIN 81 MG 81 MG: 81 TABLET ORAL at 08:53

## 2019-05-31 RX ADMIN — Medication 10 ML: at 11:54

## 2019-05-31 RX ADMIN — FERROUS SULFATE TAB EC 324 MG (65 MG FE EQUIVALENT) 324 MG: 324 (65 FE) TABLET DELAYED RESPONSE at 08:53

## 2019-05-31 RX ADMIN — ISOSORBIDE MONONITRATE 30 MG: 30 TABLET, EXTENDED RELEASE ORAL at 08:53

## 2019-05-31 NOTE — CARE COORDINATION
Referral for [place] sent to [facility] (pt's provider).   No DME needs noted at this time

## 2019-05-31 NOTE — PROGRESS NOTES
Nutrition Education    Type and Reason for Visit: Patient Education, Consult(verbal)    Patient stated that they understood edu on low potassium diet    · Verbally reviewed following information with Patient: high potassium foods to stay away from and foods that may have in small amounts. · Written educational materials provided. · Contact name and number provided. · Refer to Patient Education activity for more details.     Electronically signed by Celia Love on 5/31/19 at 2:27 PM

## 2019-05-31 NOTE — PROGRESS NOTES
Nutrition Assessment    Type and Reason for Visit: Patient Education, Consult(verbal)    Nutrition Recommendations: No new recommendations, encourage compliance of diet. Nutrition Assessment: Patient improving slightly from a nutritional standpoint AEB improved potassium levels. Patient at risk for further compromise r/t expected non-compliance. Will go back over edu today before pt discharged. Malnutrition Assessment:  · Malnutrition Status: No malnutrition  · Context: Chronic illness  · Findings of the 6 clinical characteristics of malnutrition (Minimum of 2 out of 6 clinical characteristics is required to make the diagnosis of moderate or severe Protein Calorie Malnutrition based on AND/ASPEN Guidelines):  1. Energy Intake- ,      2. Weight Loss-No significant weight loss,    3. Fat Loss-No significant subcutaneous fat loss,    4. Muscle Loss-No significant muscle mass loss,    5. Fluid Accumulation-No significant fluid accumulation,    6.  Strength-Not measured    Nutrition Risk Level: Moderate    Nutrient Needs:  · Estimated Daily Total Kcal: 0510-5407(1.2-1.3 for activity factor subtract 250-500 kcal for safe weight loss)  · Estimated Daily Protein (g): 58(0.8 gm/kg std body wt.)  · Estimated Daily Total Fluid (ml/day): 1000 ml plus output; MD has place pt on 2000 ml fluid restricition    Nutrition Diagnosis:   · Problem: Food and nutrition-related knowledge deficit  · Etiology: related to Renal dysfunction, Endocrine dysfunction     Signs and symptoms:  as evidenced by Lab values    Objective Information:  · Nutrition-Focused Physical Findings: No recent weight loss, no apparent fat loss or muscle wasting. No edema reported. Patient is obese and and does have skin tears and redness.   · Wound Type:    · Current Nutrition Therapies:  · Oral Diet Orders: Carb Control 4 Carbs/Meal, Low Potassium, Fluid Restriction   · Oral Diet intake: (64-88%)  · Oral Nutrition Supplement (ONS) Orders: None  · ONS intake:    · Anthropometric Measures:  · Ht: 5' 9\" (175.3 cm)   · Current Body Wt: 255 lb (115.7 kg)  · Admission Body Wt:    · Usual Body Wt:    · % Weight Change:  ,     · Ideal Body Wt: 145 lb (65.8 kg), % Ideal Body 176  · Adjusted Body Wt:  , body weight adjusted for    · BMI Classification: BMI 35.0 - 39.9 Obese Class II(37.7)    Nutrition Interventions:   Continue current diet  Continued Inpatient Monitoring, Education Completed(went over foods to stay away from and foods that patient could have. Left contact info for further questions.)    Nutrition Evaluation:   · Evaluation: Progressing toward goals   · Goals: to meet est needs, while promoting healthier choices to help improve labs.     · Monitoring: Meal Intake, I&O, Weight, Pertinent Labs      Electronically signed by Araseli Walton on 5/31/19 at 2:26 PM

## 2019-05-31 NOTE — DISCHARGE SUMMARY
Discharge Summary      Patient ID: Zuleyka Desir      Patient's PCP: JOSE Li    Admit Date: 5/29/2019     Discharge Date:  5/31/2019    Admitting Provider: Deangelo Gurrola MD    Discharging Provider: Francisca Pallas, APRN     Reason for this admission:   Nausea, vomiting, dizziness    Discharge Diagnoses: Active Hospital Problems    Diagnosis Date Noted    Nausea and vomiting [R11.2] 05/30/2019    Morbid obesity ([address] [E66.01] 02/05/2019    Hyperkalemia [E87.5] 01/09/2019    CKD (chronic kidney disease) [N18.9]     Type 2 diabetes mellitus with complication (HCC) [W19.9]     Hypothyroidism [E03.9]     Essential hypertension [I10]        Procedures:  XR ABDOMEN (KUB) (SINGLE AP VIEW)   Final Result   See above            Consults:   IP CONSULT TO CASE MANAGEMENT  IP CONSULT TO DIETITIAN  IP CONSULT TO HOME CARE NEEDS  PT/OT    Briefly:   The patient is a 59 y.o. female who presented to ED initially complaining of nausea and vomiting that she accredits to motion sickness from being transported home from the nursing home. Hospital Course: Active Hospital Problems    Diagnosis Date Noted    Nausea and vomiting [R11.2] 05/30/2019    Morbid obesity ([address] [E66.01] 02/05/2019    Hyperkalemia [E87.5] 01/09/2019    CKD (chronic kidney disease) [N18.9]     Type 2 diabetes mellitus with complication (HCC) [P83.1]     Hypothyroidism [E03.9]     Essential hypertension [I10]      Patient was admitted with nausea, vomiting and dizziness. Found to be hyperkalemic with potassium of 6 on arrival, hypertensive and tachycardic. Kayexalate, IV hydration administered. Hyperkalemia resolved. Patient noted to be tachycardic during admission. Patient had persistent tachycardia that did not respond well to rate control medications during admission. Patient's symptoms have improved and she feels much better. She refused to be discharged to nursing facility.   followed for assistance with DC Date    WBC 7.7 05/31/2019    HGB 10.6 05/31/2019    HCT 35.4 05/31/2019     05/31/2019       RENAL:   Lab Results   Component Value Date     05/31/2019    K 5.0 05/31/2019    K 5.5 05/30/2019     05/31/2019    CO2 19 05/31/2019    BUN 42 05/31/2019    CREATININE 1.6 05/31/2019         Discharge Medications:     Current Discharge Medication List           Details   diltiazem (CARDIZEM CD) 180 MG extended release capsule Take 1 capsule by mouth daily  Qty: 30 capsule, Refills: 0              Details   hydrALAZINE (APRESOLINE) 25 MG tablet Take 1 tablet by mouth 2 times daily  Qty: 60 tablet, Refills: 0              Details   gabapentin (NEURONTIN) 300 MG capsule Take 1 capsule by mouth 2 times daily for 2 days. Qty: 60 capsule, Refills: 0    Associated Diagnoses: Diabetic polyneuropathy associated with type 2 diabetes mellitus (HCC)      aspirin 81 MG chewable tablet Take 81 mg by mouth daily      isosorbide mononitrate (ISMO;MONOKET) 10 MG tablet Take 15 mg by mouth daily      !! levothyroxine (SYNTHROID) 100 MCG tablet Take 100 mcg by mouth Daily Patient is taking total of 150 mcg per day. Wound Dressings ([facility] WOUND/BURN DRESSING) PSTE paste Apply 1 g topically as needed Apply daily and prn to right heel and cover with dry dressing until healed. Skin Protectants, Misc. (DIMETHICONE-ZINC OXIDE) cream Apply 1 Dose topically as needed for Dry Skin (for redness) Apply topically 2 times daily as needed. bisoprolol (ZEBETA) 10 MG tablet Take 1 tablet by mouth 2 times daily  Qty: 60 tablet, Refills: 0      lidocaine (LIDODERM) 5 % Place 1 patch onto the skin daily Apply to left knee every morning and remove every evening (12 hours on, 12 hours off).       Ferrous Sulfate (IRON) 325 (65 Fe) MG TABS take 1 tablet by mouth twice a day with food  Qty: 60 tablet, Refills: 5      pantoprazole (PROTONIX) 40 MG tablet Take 40 mg by mouth daily      docusate sodium (COLACE) 100 MG capsule Take 100 mg by mouth 2 times daily      saccharomyces boulardii (FLORASTOR) 250 MG capsule Take 250 mg by mouth 2 times daily      polyethylene glycol (MIRALAX) powder Take 17 g by mouth 2 times daily      zinc sulfate (ZINCATE) 220 (50 Zn) MG capsule Take 220 mg by mouth 2 times daily      arginine 500 MG tablet Take 500 mg by mouth daily      insulin aspart (NOVOLOG) 100 UNIT/ML injection vial Inject into the skin 3 times daily (before meals) Per low dose sliding scale protocol (Patient was receiving Novolog in nursing home, Humalog is waiting at pharmacy to be picked up). ammonium lactate (AMLACTIN) 12 % cream Apply topically 2 times daily Apply topically as needed. acetaminophen (TYLENOL) 325 MG tablet Take 650 mg by mouth every 6 hours as needed for Pain      Multiple Vitamins-Minerals (THERAPEUTIC MULTIVITAMIN-MINERALS) tablet Take 1 tablet by mouth daily      simvastatin (ZOCOR) 20 MG tablet take 1 tablet by mouth at bedtime  Qty: 30 tablet, Refills: 5      RA VITAMIN C 500 MG tablet TAKE 1 TABLET BY MOUTH DAILY  Qty: 30 tablet, Refills: 5      !! levothyroxine (SYNTHROID) 50 MCG tablet Take 1 tablet by mouth Daily  Qty: 30 tablet, Refills: 0      blood glucose test strips (FREESTYLE TEST STRIPS) strip Daily dx:250.00  Qty: 100 each, Refills: 5    Associated Diagnoses: Type 2 diabetes mellitus with complication, with long-term current use of insulin (HCC)      glucose monitoring kit (FREESTYLE) monitoring kit 1 kit by Does not apply route daily as needed (elevated glucose) Dx e 11.9  Qty: 1 kit, Refills: 0       !! - Potential duplicate medications found. Please discuss with provider. Patient was seen and examined by Dr. Samantha Sumner and plan of care reviewed. Signed:  Electronically signed by JOSE Cadet on 5/31/2019 at 3:49 PM       Thank you JOSE Monique for the opportunity to be involved in this patient's care.  If you have any questions or concerns please feel

## 2019-05-31 NOTE — CARE COORDINATION
Patient declined PT skilled services on this date. Will attempt on next tx date of 6/3/2019.     Electronically signed by Mateo Velasquez PTA on 5/31/2019 at 1:08 PM

## 2019-05-31 NOTE — PROGRESS NOTES
Placed call to Dr. Ji Lewis office, transferred to scheduling, Left message with appointment needs and return phone number.

## 2019-05-31 NOTE — CARE COORDINATION
[id number] [facility] and faxed orders with clinicals.

## 2019-05-31 NOTE — PROGRESS NOTES
Placed call to Dr. Natasha Cox office, for consult with Dr. Vani Morales. Awaiting response/calll back.

## 2019-06-03 ENCOUNTER — APPOINTMENT (OUTPATIENT)
Dept: CT IMAGING | Facility: HOSPITAL | Age: 59
DRG: 690 | End: 2019-06-03
Payer: MEDICARE

## 2019-06-03 ENCOUNTER — HOSPITAL ENCOUNTER (INPATIENT)
Facility: HOSPITAL | Age: 59
LOS: 3 days | Discharge: NURSING, REHAB OR LONG-TERM CARE | DRG: 690 | End: 2019-06-06
Attending: HOSPITALIST | Admitting: INTERNAL MEDICINE
Payer: MEDICARE

## 2019-06-03 ENCOUNTER — APPOINTMENT (OUTPATIENT)
Dept: GENERAL RADIOLOGY | Facility: HOSPITAL | Age: 59
DRG: 690 | End: 2019-06-03
Payer: MEDICARE

## 2019-06-03 DIAGNOSIS — N30.00 ACUTE CYSTITIS WITHOUT HEMATURIA: ICD-10-CM

## 2019-06-03 DIAGNOSIS — Z86.79 HISTORY OF ATRIAL FLUTTER: ICD-10-CM

## 2019-06-03 DIAGNOSIS — R11.2 NON-INTRACTABLE VOMITING WITH NAUSEA, UNSPECIFIED VOMITING TYPE: Primary | ICD-10-CM

## 2019-06-03 LAB
A/G RATIO: 1.1 (ref 0.8–2)
ALBUMIN SERPL-MCNC: 3.8 G/DL (ref 3.4–4.8)
ALP BLD-CCNC: 140 U/L (ref 25–100)
ALT SERPL-CCNC: 10 U/L (ref 4–36)
ANION GAP SERPL CALCULATED.3IONS-SCNC: 16 MMOL/L (ref 3–16)
AST SERPL-CCNC: 10 U/L (ref 8–33)
BACTERIA: ABNORMAL /HPF
BASOPHILS ABSOLUTE: 0 K/UL (ref 0–0.1)
BASOPHILS RELATIVE PERCENT: 0.4 %
BILIRUB SERPL-MCNC: 0.9 MG/DL (ref 0.3–1.2)
BILIRUBIN URINE: NEGATIVE
BLOOD, URINE: ABNORMAL
BUN BLDV-MCNC: 28 MG/DL (ref 6–20)
CALCIUM SERPL-MCNC: 9.3 MG/DL (ref 8.5–10.5)
CHLORIDE BLD-SCNC: 107 MMOL/L (ref 98–107)
CLARITY: CLEAR
CO2: 17 MMOL/L (ref 20–30)
COLOR: YELLOW
CREAT SERPL-MCNC: 1.3 MG/DL (ref 0.4–1.2)
EOSINOPHILS ABSOLUTE: 0.1 K/UL (ref 0–0.4)
EOSINOPHILS RELATIVE PERCENT: 0.6 %
EPITHELIAL CELLS, UA: ABNORMAL /HPF
GFR AFRICAN AMERICAN: 50
GFR NON-AFRICAN AMERICAN: 42
GLOBULIN: 3.6 G/DL
GLUCOSE BLD-MCNC: 221 MG/DL (ref 74–106)
GLUCOSE BLD-MCNC: 242 MG/DL (ref 74–106)
GLUCOSE URINE: NEGATIVE MG/DL
HCT VFR BLD CALC: 41.8 % (ref 37–47)
HEMOGLOBIN: 12.5 G/DL (ref 11.5–16.5)
IMMATURE GRANULOCYTES #: 0 K/UL
IMMATURE GRANULOCYTES %: 0.2 % (ref 0–5)
KETONES, URINE: 15 MG/DL
LACTIC ACID: 1.8 MMOL/L (ref 0.4–2)
LEUKOCYTE ESTERASE, URINE: NEGATIVE
LIPASE: 15 U/L (ref 5.6–51.3)
LYMPHOCYTES ABSOLUTE: 0.8 K/UL (ref 1.5–4)
LYMPHOCYTES RELATIVE PERCENT: 9.8 %
MCH RBC QN AUTO: 25.6 PG (ref 27–32)
MCHC RBC AUTO-ENTMCNC: 29.9 G/DL (ref 31–35)
MCV RBC AUTO: 85.5 FL (ref 80–100)
MICROSCOPIC EXAMINATION: YES
MONOCYTES ABSOLUTE: 0.4 K/UL (ref 0.2–0.8)
MONOCYTES RELATIVE PERCENT: 4.7 %
NEUTROPHILS ABSOLUTE: 7.1 K/UL (ref 2–7.5)
NEUTROPHILS RELATIVE PERCENT: 84.3 %
NITRITE, URINE: POSITIVE
PDW BLD-RTO: 23.3 % (ref 11–16)
PERFORMED ON: ABNORMAL
PH UA: 5.5 (ref 5–8)
PLATELET # BLD: 186 K/UL (ref 150–400)
PMV BLD AUTO: 11 FL (ref 6–10)
POTASSIUM REFLEX MAGNESIUM: 4.4 MMOL/L (ref 3.4–5.1)
PROTEIN UA: >=300 MG/DL
RBC # BLD: 4.89 M/UL (ref 3.8–5.8)
RBC UA: ABNORMAL /HPF (ref 0–2)
SODIUM BLD-SCNC: 140 MMOL/L (ref 136–145)
SPECIFIC GRAVITY UA: 1.02 (ref 1–1.03)
TOTAL PROTEIN: 7.4 G/DL (ref 6.4–8.3)
TROPONIN: <0.3 NG/ML
URINE REFLEX TO CULTURE: YES
URINE TYPE: ABNORMAL
UROBILINOGEN, URINE: 1 E.U./DL
WBC # BLD: 8.5 K/UL (ref 4–11)
WBC UA: >100 /HPF (ref 0–5)

## 2019-06-03 PROCEDURE — 70450 CT HEAD/BRAIN W/O DYE: CPT

## 2019-06-03 PROCEDURE — 99285 EMERGENCY DEPT VISIT HI MDM: CPT

## 2019-06-03 PROCEDURE — 96374 THER/PROPH/DIAG INJ IV PUSH: CPT

## 2019-06-03 PROCEDURE — 6360000002 HC RX W HCPCS: Performed by: INTERNAL MEDICINE

## 2019-06-03 PROCEDURE — 36415 COLL VENOUS BLD VENIPUNCTURE: CPT

## 2019-06-03 PROCEDURE — 2580000003 HC RX 258: Performed by: PHYSICIAN ASSISTANT

## 2019-06-03 PROCEDURE — 80053 COMPREHEN METABOLIC PANEL: CPT

## 2019-06-03 PROCEDURE — 6360000002 HC RX W HCPCS: Performed by: PHYSICIAN ASSISTANT

## 2019-06-03 PROCEDURE — 87040 BLOOD CULTURE FOR BACTERIA: CPT

## 2019-06-03 PROCEDURE — 83605 ASSAY OF LACTIC ACID: CPT

## 2019-06-03 PROCEDURE — 74176 CT ABD & PELVIS W/O CONTRAST: CPT

## 2019-06-03 PROCEDURE — 93005 ELECTROCARDIOGRAM TRACING: CPT

## 2019-06-03 PROCEDURE — 71045 X-RAY EXAM CHEST 1 VIEW: CPT

## 2019-06-03 PROCEDURE — 85025 COMPLETE CBC W/AUTO DIFF WBC: CPT

## 2019-06-03 PROCEDURE — 87077 CULTURE AEROBIC IDENTIFY: CPT

## 2019-06-03 PROCEDURE — 87086 URINE CULTURE/COLONY COUNT: CPT

## 2019-06-03 PROCEDURE — 2580000003 HC RX 258: Performed by: HOSPITALIST

## 2019-06-03 PROCEDURE — 83690 ASSAY OF LIPASE: CPT

## 2019-06-03 PROCEDURE — 6370000000 HC RX 637 (ALT 250 FOR IP): Performed by: PHYSICIAN ASSISTANT

## 2019-06-03 PROCEDURE — 81001 URINALYSIS AUTO W/SCOPE: CPT

## 2019-06-03 PROCEDURE — 84484 ASSAY OF TROPONIN QUANT: CPT

## 2019-06-03 PROCEDURE — 1200000000 HC SEMI PRIVATE

## 2019-06-03 PROCEDURE — 96375 TX/PRO/DX INJ NEW DRUG ADDON: CPT

## 2019-06-03 PROCEDURE — 2500000003 HC RX 250 WO HCPCS: Performed by: HOSPITALIST

## 2019-06-03 PROCEDURE — 6360000002 HC RX W HCPCS: Performed by: HOSPITALIST

## 2019-06-03 PROCEDURE — 6370000000 HC RX 637 (ALT 250 FOR IP)

## 2019-06-03 PROCEDURE — 87186 SC STD MICRODIL/AGAR DIL: CPT

## 2019-06-03 RX ORDER — DEXTROSE MONOHYDRATE 50 MG/ML
100 INJECTION, SOLUTION INTRAVENOUS PRN
Status: DISCONTINUED | OUTPATIENT
Start: 2019-06-03 | End: 2019-06-06 | Stop reason: HOSPADM

## 2019-06-03 RX ORDER — DEXTROSE MONOHYDRATE 25 G/50ML
12.5 INJECTION, SOLUTION INTRAVENOUS PRN
Status: DISCONTINUED | OUTPATIENT
Start: 2019-06-03 | End: 2019-06-06 | Stop reason: HOSPADM

## 2019-06-03 RX ORDER — M-VIT,TX,IRON,MINS/CALC/FOLIC 27MG-0.4MG
1 TABLET ORAL DAILY
Status: DISCONTINUED | OUTPATIENT
Start: 2019-06-03 | End: 2019-06-06 | Stop reason: HOSPADM

## 2019-06-03 RX ORDER — GLUCOSAMINE HCL/CHONDROITIN SU 500-400 MG
CAPSULE ORAL
Qty: 100 STRIP | Refills: 5 | Status: SHIPPED | OUTPATIENT
Start: 2019-06-03 | End: 2020-01-16 | Stop reason: SDUPTHER

## 2019-06-03 RX ORDER — ONDANSETRON 2 MG/ML
4 INJECTION INTRAMUSCULAR; INTRAVENOUS EVERY 6 HOURS PRN
Status: DISCONTINUED | OUTPATIENT
Start: 2019-06-03 | End: 2019-06-06 | Stop reason: HOSPADM

## 2019-06-03 RX ORDER — LIDOCAINE 4 G/G
1 PATCH TOPICAL DAILY
Status: DISCONTINUED | OUTPATIENT
Start: 2019-06-03 | End: 2019-06-06 | Stop reason: HOSPADM

## 2019-06-03 RX ORDER — BENZONATATE 100 MG/1
200 CAPSULE ORAL 2 TIMES DAILY PRN
Status: DISCONTINUED | OUTPATIENT
Start: 2019-06-03 | End: 2019-06-06 | Stop reason: HOSPADM

## 2019-06-03 RX ORDER — DILTIAZEM HYDROCHLORIDE 180 MG/1
180 CAPSULE, COATED, EXTENDED RELEASE ORAL DAILY
Status: DISCONTINUED | OUTPATIENT
Start: 2019-06-03 | End: 2019-06-05

## 2019-06-03 RX ORDER — FERROUS SULFATE TAB EC 324 MG (65 MG FE EQUIVALENT) 324 (65 FE) MG
324 TABLET DELAYED RESPONSE ORAL 2 TIMES DAILY WITH MEALS
Status: DISCONTINUED | OUTPATIENT
Start: 2019-06-03 | End: 2019-06-06 | Stop reason: HOSPADM

## 2019-06-03 RX ORDER — METOPROLOL TARTRATE 50 MG/1
50 TABLET, FILM COATED ORAL 2 TIMES DAILY
Status: DISCONTINUED | OUTPATIENT
Start: 2019-06-03 | End: 2019-06-06 | Stop reason: HOSPADM

## 2019-06-03 RX ORDER — ACETAMINOPHEN 325 MG/1
650 TABLET ORAL EVERY 6 HOURS PRN
Status: DISCONTINUED | OUTPATIENT
Start: 2019-06-03 | End: 2019-06-06 | Stop reason: HOSPADM

## 2019-06-03 RX ORDER — ASPIRIN 81 MG/1
81 TABLET, CHEWABLE ORAL DAILY
Status: DISCONTINUED | OUTPATIENT
Start: 2019-06-03 | End: 2019-06-06 | Stop reason: HOSPADM

## 2019-06-03 RX ORDER — ASCORBIC ACID 500 MG
500 TABLET ORAL DAILY
Status: DISCONTINUED | OUTPATIENT
Start: 2019-06-03 | End: 2019-06-06 | Stop reason: HOSPADM

## 2019-06-03 RX ORDER — PANTOPRAZOLE SODIUM 40 MG/1
40 TABLET, DELAYED RELEASE ORAL DAILY
Status: DISCONTINUED | OUTPATIENT
Start: 2019-06-03 | End: 2019-06-06 | Stop reason: HOSPADM

## 2019-06-03 RX ORDER — DILTIAZEM HYDROCHLORIDE 5 MG/ML
10 INJECTION INTRAVENOUS ONCE
Status: COMPLETED | OUTPATIENT
Start: 2019-06-03 | End: 2019-06-03

## 2019-06-03 RX ORDER — HYDRALAZINE HYDROCHLORIDE 20 MG/ML
20 INJECTION INTRAMUSCULAR; INTRAVENOUS ONCE
Status: COMPLETED | OUTPATIENT
Start: 2019-06-03 | End: 2019-06-03

## 2019-06-03 RX ORDER — NICOTINE POLACRILEX 4 MG
15 LOZENGE BUCCAL PRN
Status: DISCONTINUED | OUTPATIENT
Start: 2019-06-03 | End: 2019-06-06 | Stop reason: HOSPADM

## 2019-06-03 RX ORDER — HYDRALAZINE HYDROCHLORIDE 20 MG/ML
10 INJECTION INTRAMUSCULAR; INTRAVENOUS ONCE
Status: COMPLETED | OUTPATIENT
Start: 2019-06-03 | End: 2019-06-03

## 2019-06-03 RX ORDER — SIMVASTATIN 20 MG
20 TABLET ORAL NIGHTLY
Status: DISCONTINUED | OUTPATIENT
Start: 2019-06-03 | End: 2019-06-06 | Stop reason: HOSPADM

## 2019-06-03 RX ORDER — ZINC SULFATE 50(220)MG
220 CAPSULE ORAL 2 TIMES DAILY
Status: DISCONTINUED | OUTPATIENT
Start: 2019-06-03 | End: 2019-06-06 | Stop reason: HOSPADM

## 2019-06-03 RX ORDER — POLYETHYLENE GLYCOL 3350 17 G/17G
17 POWDER, FOR SOLUTION ORAL 2 TIMES DAILY
Status: DISCONTINUED | OUTPATIENT
Start: 2019-06-03 | End: 2019-06-06 | Stop reason: HOSPADM

## 2019-06-03 RX ORDER — ISOSORBIDE MONONITRATE 30 MG/1
TABLET, EXTENDED RELEASE ORAL
Status: COMPLETED
Start: 2019-06-03 | End: 2019-06-03

## 2019-06-03 RX ORDER — ISOSORBIDE MONONITRATE 10 MG/1
15 TABLET ORAL DAILY
Status: DISCONTINUED | OUTPATIENT
Start: 2019-06-03 | End: 2019-06-04 | Stop reason: ALTCHOICE

## 2019-06-03 RX ORDER — ONDANSETRON 2 MG/ML
4 INJECTION INTRAMUSCULAR; INTRAVENOUS EVERY 30 MIN PRN
Status: DISCONTINUED | OUTPATIENT
Start: 2019-06-03 | End: 2019-06-03

## 2019-06-03 RX ORDER — LORAZEPAM 2 MG/ML
0.5 INJECTION INTRAMUSCULAR ONCE
Status: COMPLETED | OUTPATIENT
Start: 2019-06-03 | End: 2019-06-03

## 2019-06-03 RX ORDER — PROMETHAZINE HYDROCHLORIDE 25 MG/ML
12.5 INJECTION, SOLUTION INTRAMUSCULAR; INTRAVENOUS EVERY 6 HOURS PRN
Status: DISCONTINUED | OUTPATIENT
Start: 2019-06-03 | End: 2019-06-06 | Stop reason: HOSPADM

## 2019-06-03 RX ORDER — HYDRALAZINE HYDROCHLORIDE 25 MG/1
25 TABLET, FILM COATED ORAL 2 TIMES DAILY
Status: DISCONTINUED | OUTPATIENT
Start: 2019-06-03 | End: 2019-06-05

## 2019-06-03 RX ORDER — SACCHAROMYCES BOULARDII 250 MG
250 CAPSULE ORAL 2 TIMES DAILY
Status: DISCONTINUED | OUTPATIENT
Start: 2019-06-03 | End: 2019-06-06 | Stop reason: HOSPADM

## 2019-06-03 RX ORDER — MORPHINE SULFATE 4 MG/ML
4 INJECTION, SOLUTION INTRAMUSCULAR; INTRAVENOUS EVERY 30 MIN PRN
Status: DISCONTINUED | OUTPATIENT
Start: 2019-06-03 | End: 2019-06-03

## 2019-06-03 RX ORDER — 0.9 % SODIUM CHLORIDE 0.9 %
1000 INTRAVENOUS SOLUTION INTRAVENOUS ONCE
Status: COMPLETED | OUTPATIENT
Start: 2019-06-03 | End: 2019-06-03

## 2019-06-03 RX ORDER — FUROSEMIDE 10 MG/ML
40 INJECTION INTRAMUSCULAR; INTRAVENOUS ONCE
Status: COMPLETED | OUTPATIENT
Start: 2019-06-03 | End: 2019-06-03

## 2019-06-03 RX ORDER — LEVOTHYROXINE SODIUM 0.07 MG/1
150 TABLET ORAL DAILY
Status: DISCONTINUED | OUTPATIENT
Start: 2019-06-04 | End: 2019-06-06 | Stop reason: HOSPADM

## 2019-06-03 RX ADMIN — FUROSEMIDE 40 MG: 10 INJECTION, SOLUTION INTRAMUSCULAR; INTRAVENOUS at 13:36

## 2019-06-03 RX ADMIN — HYDRALAZINE HYDROCHLORIDE 20 MG: 20 INJECTION INTRAMUSCULAR; INTRAVENOUS at 16:53

## 2019-06-03 RX ADMIN — Medication 25 MG: at 17:24

## 2019-06-03 RX ADMIN — LORAZEPAM 0.5 MG: 2 INJECTION, SOLUTION INTRAMUSCULAR; INTRAVENOUS at 18:53

## 2019-06-03 RX ADMIN — SODIUM CHLORIDE 1000 ML: 9 INJECTION, SOLUTION INTRAVENOUS at 11:48

## 2019-06-03 RX ADMIN — ONDANSETRON 4 MG: 2 INJECTION INTRAMUSCULAR; INTRAVENOUS at 16:56

## 2019-06-03 RX ADMIN — MULTIPLE VITAMINS W/ MINERALS TAB 1 TABLET: TAB at 21:42

## 2019-06-03 RX ADMIN — ONDANSETRON 4 MG: 2 INJECTION INTRAMUSCULAR; INTRAVENOUS at 14:14

## 2019-06-03 RX ADMIN — ZINC SULFATE 220 MG (50 MG) CAPSULE 220 MG: CAPSULE at 21:41

## 2019-06-03 RX ADMIN — MEROPENEM 1 G: 1 INJECTION, POWDER, FOR SOLUTION INTRAVENOUS at 14:28

## 2019-06-03 RX ADMIN — ISOSORBIDE MONONITRATE 30 MG: 30 TABLET, EXTENDED RELEASE ORAL at 21:43

## 2019-06-03 RX ADMIN — ENOXAPARIN SODIUM 40 MG: 40 INJECTION SUBCUTANEOUS at 21:41

## 2019-06-03 RX ADMIN — SIMVASTATIN 20 MG: 20 TABLET, FILM COATED ORAL at 21:42

## 2019-06-03 RX ADMIN — MEROPENEM 1 G: 1 INJECTION, POWDER, FOR SOLUTION INTRAVENOUS at 21:41

## 2019-06-03 RX ADMIN — DILTIAZEM HYDROCHLORIDE 180 MG: 180 CAPSULE, COATED, EXTENDED RELEASE ORAL at 21:00

## 2019-06-03 RX ADMIN — ONDANSETRON 4 MG: 2 INJECTION INTRAMUSCULAR; INTRAVENOUS at 21:46

## 2019-06-03 RX ADMIN — MORPHINE SULFATE 4 MG: 4 INJECTION INTRAVENOUS at 12:08

## 2019-06-03 RX ADMIN — DILTIAZEM HYDROCHLORIDE 10 MG: 5 INJECTION INTRAVENOUS at 14:18

## 2019-06-03 RX ADMIN — POLYETHYLENE GLYCOL 3350 17 G: 17 POWDER, FOR SOLUTION ORAL at 21:54

## 2019-06-03 RX ADMIN — METOPROLOL TARTRATE 50 MG: 50 TABLET, FILM COATED ORAL at 21:42

## 2019-06-03 RX ADMIN — INSULIN LISPRO 1 UNITS: 100 INJECTION, SOLUTION INTRAVENOUS; SUBCUTANEOUS at 21:56

## 2019-06-03 RX ADMIN — HYDRALAZINE HYDROCHLORIDE 10 MG: 20 INJECTION INTRAMUSCULAR; INTRAVENOUS at 16:18

## 2019-06-03 RX ADMIN — ISOSORBIDE MONONITRATE 15 MG: 10 TABLET ORAL at 21:40

## 2019-06-03 RX ADMIN — FERROUS SULFATE TAB EC 324 MG (65 MG FE EQUIVALENT) 324 MG: 324 (65 FE) TABLET DELAYED RESPONSE at 21:43

## 2019-06-03 RX ADMIN — HYDRALAZINE HYDROCHLORIDE 25 MG: 25 TABLET, FILM COATED ORAL at 21:42

## 2019-06-03 RX ADMIN — BENZONATATE 200 MG: 100 CAPSULE ORAL at 21:42

## 2019-06-03 ASSESSMENT — PAIN DESCRIPTION - PROGRESSION: CLINICAL_PROGRESSION: GRADUALLY WORSENING

## 2019-06-03 ASSESSMENT — PAIN SCALES - GENERAL
PAINLEVEL_OUTOF10: 10
PAINLEVEL_OUTOF10: 9

## 2019-06-03 ASSESSMENT — PAIN DESCRIPTION - FREQUENCY: FREQUENCY: CONTINUOUS

## 2019-06-03 ASSESSMENT — PAIN DESCRIPTION - LOCATION: LOCATION: ABDOMEN

## 2019-06-03 ASSESSMENT — PAIN DESCRIPTION - DESCRIPTORS: DESCRIPTORS: THROBBING

## 2019-06-03 ASSESSMENT — PAIN DESCRIPTION - PAIN TYPE: TYPE: ACUTE PAIN

## 2019-06-03 NOTE — ED NOTES
Patient brought back from CT. Staff states she could not have test done because patient states her stomach hurt and then threw up. Dr Daksha Barkley aware.      Sergio Garza, RN  06/03/19 6960 [address] [facility] Cecilia, RICHARD  06/03/19 6170

## 2019-06-03 NOTE — ED NOTES
Spoke with Ingrid Oliveros at [employer], concerning patient's welfare at home and how she arrived to ED. ID # [id number].      Bruna Mccurdy RN  06/03/19 7340

## 2019-06-03 NOTE — ED NOTES
Called Vicki per Dr. Julio Cook request concerning this patient.      [facility]  06/03/19 5807

## 2019-06-03 NOTE — ED NOTES
Jim Ulloa [place] states that patient's bp must be =< 165/95 before she can go to medical unit.      Sergio Garza RN  06/03/19 9854

## 2019-06-03 NOTE — ED TRIAGE NOTES
Patient arrives with [facility] with complaints of N/V for couple days. No emesis witnessed by ems or ED. Patient complains of headache that started after vomiting. Has not been taking her medication at home because she is mad at her brother. Patient smells of urine and is still wearing hospital bracelet from last admission. She was released from [facility] on Friday.

## 2019-06-03 NOTE — PROGRESS NOTES
Medications reviewed at the request of ER nursing staff. Per Addis Oakes RN, patient unable to answer any questions regarding specific medications (this was the case when I interviewed her during med rec on 5/29). Patient did state to RN that she hasn't taken any medications since discharged from this facility because she was mad at her son. I have verified current medication list against discharge summary (no changes). I informed hospitalist extender KAREN Higgins, that med list is reflective of what patient has had ordered and is supposed to be taking as of her discharge but that she has stated she hasn't taken it since going home. I did confirm with Ruddy Liu, [facility], that patient did  several medications ordered from her nursing home stay after her discharge from us so the medications should be available to her.

## 2019-06-03 NOTE — ED NOTES
Jennifer Whitt, patient's POA. She states that she wants the patient to go to nursing home, Nguyễn Garcia can not go back home\", her home health nurse told them that she needed more rehab and a message was left with Makenzie Shay today concerning her returning to [facility]. Nurse informs her that she would speak with Kumar Garcia at [facility] and call her back. [phone].      Terra Collins RN  06/03/19 6563

## 2019-06-03 NOTE — PROGRESS NOTES
Pt admitted from ER to Room 12 with Dx Acute cystitis under Dr. Duke Olivares' services. Pt oriented to room,call bell, tv, phone and bathroom. No acute distress noted on arrival. Telemetry [id number] placed on pt. H/O:GERD,Type II diabetes, HTN,Hypothyroidism,Acute metabolic encephalopathy. Rosemarie Tim

## 2019-06-03 NOTE — ED NOTES
Nurse to room to clean patient prior to placement of barney cath. Patient still wearing hospital gown from when she was discharged from previous hospital stay. The chucks/ bed linen that she had underneath her are urine soaked. She tells nurse that she has not been out of bed since she came home. The linens and chucks are those that she had when EMS took her home and put her in the bed. A med bottle labeled meclizine is found in linens along with a white round pill. Patient asked if she had someone to help her at home and she said her son but he just smarts off.      Sandra Jc, RN  06/03/19 249 [facility] RICHARD Yap  06/03/19 4810

## 2019-06-03 NOTE — ED NOTES
Patient to be admitted to room 3-2 on Medical Unit, Liss Villalba RN to call back for patient report.      Mikayla Fuchs RN  06/03/19 8903

## 2019-06-03 NOTE — ED NOTES
Jerilyn Anaya in pharmacy called and asked to review home medications for possible.      Nisha Castañeda RN  06/03/19 0590

## 2019-06-03 NOTE — ED NOTES
Spoke with Rico Kwan at [facility] and she states that there are currently no beds available there but she would call other facilities and call back as soon as she knows something. She also states that the patient wanted to go home last week and her family wanted the same. She has no POA papers in patient's chart at [facility].      Sergio Garza RN  06/03/19 8419

## 2019-06-03 NOTE — ED NOTES
Pt gagging while nursing in the room- small amount emesis noted while nurse at bedside     Catherine Rodriguez [facility]  06/03/19 2930

## 2019-06-03 NOTE — ED NOTES
No vomiting noted at this time- pt laying on stretcher with HOB elevated and eyes shut     Stevo Laird RN  06/03/19 4289
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
## 2019-06-03 NOTE — ED NOTES
Patient resting with eyes closed. BP remains elevated, Dr Belle Relic aware.      Steve Tidwell RN  06/03/19 0479

## 2019-06-03 NOTE — ED NOTES
Patient yelling / screaming \" want my cold rag\", patient also asking for something to drink while gagging. No emesis noted. meds admin.      Jin Goel RN  06/03/19 0689

## 2019-06-04 PROBLEM — R53.81 DECLINING FUNCTIONAL STATUS: Status: ACTIVE | Noted: 2019-06-04

## 2019-06-04 PROBLEM — R00.0 TACHYCARDIA: Status: ACTIVE | Noted: 2019-06-04

## 2019-06-04 LAB
A/G RATIO: 1.3 (ref 0.8–2)
ALBUMIN SERPL-MCNC: 3.4 G/DL (ref 3.4–4.8)
ALP BLD-CCNC: 121 U/L (ref 25–100)
ALT SERPL-CCNC: 9 U/L (ref 4–36)
ANION GAP SERPL CALCULATED.3IONS-SCNC: 14 MMOL/L (ref 3–16)
AST SERPL-CCNC: 12 U/L (ref 8–33)
BILIRUB SERPL-MCNC: 0.6 MG/DL (ref 0.3–1.2)
BUN BLDV-MCNC: 28 MG/DL (ref 6–20)
CALCIUM SERPL-MCNC: 9 MG/DL (ref 8.5–10.5)
CHLORIDE BLD-SCNC: 108 MMOL/L (ref 98–107)
CO2: 19 MMOL/L (ref 20–30)
CREAT SERPL-MCNC: 1.4 MG/DL (ref 0.4–1.2)
GFR AFRICAN AMERICAN: 46
GFR NON-AFRICAN AMERICAN: 38
GLOBULIN: 2.7 G/DL
GLUCOSE BLD-MCNC: 156 MG/DL (ref 74–106)
GLUCOSE BLD-MCNC: 165 MG/DL (ref 74–106)
GLUCOSE BLD-MCNC: 192 MG/DL (ref 74–106)
GLUCOSE BLD-MCNC: 202 MG/DL (ref 74–106)
GLUCOSE BLD-MCNC: 212 MG/DL (ref 74–106)
HCT VFR BLD CALC: 39.8 % (ref 37–47)
HEMOGLOBIN: 11.3 G/DL (ref 11.5–16.5)
MAGNESIUM: 1.8 MG/DL (ref 1.7–2.4)
MCH RBC QN AUTO: 25.3 PG (ref 27–32)
MCHC RBC AUTO-ENTMCNC: 28.4 G/DL (ref 31–35)
MCV RBC AUTO: 89 FL (ref 80–100)
PDW BLD-RTO: 23.3 % (ref 11–16)
PERFORMED ON: ABNORMAL
PLATELET # BLD: 161 K/UL (ref 150–400)
PMV BLD AUTO: 10.6 FL (ref 6–10)
POTASSIUM REFLEX MAGNESIUM: 4.6 MMOL/L (ref 3.4–5.1)
RBC # BLD: 4.47 M/UL (ref 3.8–5.8)
SODIUM BLD-SCNC: 141 MMOL/L (ref 136–145)
TOTAL PROTEIN: 6.1 G/DL (ref 6.4–8.3)
WBC # BLD: 8.7 K/UL (ref 4–11)

## 2019-06-04 PROCEDURE — 2580000003 HC RX 258: Performed by: PHYSICIAN ASSISTANT

## 2019-06-04 PROCEDURE — 99222 1ST HOSP IP/OBS MODERATE 55: CPT | Performed by: INTERNAL MEDICINE

## 2019-06-04 PROCEDURE — 94760 N-INVAS EAR/PLS OXIMETRY 1: CPT

## 2019-06-04 PROCEDURE — 93005 ELECTROCARDIOGRAM TRACING: CPT

## 2019-06-04 PROCEDURE — 6370000000 HC RX 637 (ALT 250 FOR IP): Performed by: PHYSICIAN ASSISTANT

## 2019-06-04 PROCEDURE — 80053 COMPREHEN METABOLIC PANEL: CPT

## 2019-06-04 PROCEDURE — 83735 ASSAY OF MAGNESIUM: CPT

## 2019-06-04 PROCEDURE — 36415 COLL VENOUS BLD VENIPUNCTURE: CPT

## 2019-06-04 PROCEDURE — 2500000003 HC RX 250 WO HCPCS: Performed by: PHYSICIAN ASSISTANT

## 2019-06-04 PROCEDURE — 1200000000 HC SEMI PRIVATE

## 2019-06-04 PROCEDURE — 6360000002 HC RX W HCPCS: Performed by: PHYSICIAN ASSISTANT

## 2019-06-04 PROCEDURE — 85027 COMPLETE CBC AUTOMATED: CPT

## 2019-06-04 PROCEDURE — 6370000000 HC RX 637 (ALT 250 FOR IP)

## 2019-06-04 RX ORDER — ISOSORBIDE MONONITRATE 30 MG/1
TABLET, EXTENDED RELEASE ORAL
Status: DISCONTINUED
Start: 2019-06-04 | End: 2019-06-04 | Stop reason: WASHOUT

## 2019-06-04 RX ORDER — METOPROLOL TARTRATE 5 MG/5ML
5 INJECTION INTRAVENOUS ONCE
Status: COMPLETED | OUTPATIENT
Start: 2019-06-04 | End: 2019-06-04

## 2019-06-04 RX ORDER — ISOSORBIDE MONONITRATE 30 MG/1
15 TABLET, EXTENDED RELEASE ORAL DAILY
Status: DISCONTINUED | OUTPATIENT
Start: 2019-06-04 | End: 2019-06-06 | Stop reason: HOSPADM

## 2019-06-04 RX ORDER — HYDRALAZINE HYDROCHLORIDE 25 MG/1
25 TABLET, FILM COATED ORAL 2 TIMES DAILY PRN
Status: DISCONTINUED | OUTPATIENT
Start: 2019-06-04 | End: 2019-06-06 | Stop reason: HOSPADM

## 2019-06-04 RX ADMIN — ASPIRIN 81 MG 81 MG: 81 TABLET ORAL at 08:53

## 2019-06-04 RX ADMIN — MEROPENEM 1 G: 1 INJECTION, POWDER, FOR SOLUTION INTRAVENOUS at 20:40

## 2019-06-04 RX ADMIN — METOPROLOL TARTRATE 50 MG: 50 TABLET, FILM COATED ORAL at 20:40

## 2019-06-04 RX ADMIN — MEROPENEM 1 G: 1 INJECTION, POWDER, FOR SOLUTION INTRAVENOUS at 05:27

## 2019-06-04 RX ADMIN — HYDRALAZINE HYDROCHLORIDE 25 MG: 25 TABLET, FILM COATED ORAL at 20:41

## 2019-06-04 RX ADMIN — ASPIRIN 81 MG 81 MG: 81 TABLET ORAL at 00:14

## 2019-06-04 RX ADMIN — METOPROLOL TARTRATE 5 MG: 5 INJECTION, SOLUTION INTRAVENOUS at 01:18

## 2019-06-04 RX ADMIN — SIMVASTATIN 20 MG: 20 TABLET, FILM COATED ORAL at 20:40

## 2019-06-04 RX ADMIN — MEROPENEM 1 G: 1 INJECTION, POWDER, FOR SOLUTION INTRAVENOUS at 14:34

## 2019-06-04 RX ADMIN — INSULIN LISPRO 1 UNITS: 100 INJECTION, SOLUTION INTRAVENOUS; SUBCUTANEOUS at 20:51

## 2019-06-04 RX ADMIN — ISOSORBIDE MONONITRATE 15 MG: 30 TABLET, EXTENDED RELEASE ORAL at 10:08

## 2019-06-04 RX ADMIN — FERROUS SULFATE TAB EC 324 MG (65 MG FE EQUIVALENT) 324 MG: 324 (65 FE) TABLET DELAYED RESPONSE at 08:53

## 2019-06-04 RX ADMIN — METOPROLOL TARTRATE 50 MG: 50 TABLET, FILM COATED ORAL at 08:53

## 2019-06-04 RX ADMIN — ZINC SULFATE 220 MG (50 MG) CAPSULE 220 MG: CAPSULE at 20:40

## 2019-06-04 RX ADMIN — PANTOPRAZOLE SODIUM 40 MG: 40 TABLET, DELAYED RELEASE ORAL at 00:14

## 2019-06-04 RX ADMIN — ENOXAPARIN SODIUM 40 MG: 40 INJECTION SUBCUTANEOUS at 08:52

## 2019-06-04 RX ADMIN — ZINC SULFATE 220 MG (50 MG) CAPSULE 220 MG: CAPSULE at 08:53

## 2019-06-04 RX ADMIN — PANTOPRAZOLE SODIUM 40 MG: 40 TABLET, DELAYED RELEASE ORAL at 08:52

## 2019-06-04 RX ADMIN — DILTIAZEM HYDROCHLORIDE 180 MG: 180 CAPSULE, COATED, EXTENDED RELEASE ORAL at 08:53

## 2019-06-04 RX ADMIN — FERROUS SULFATE TAB EC 324 MG (65 MG FE EQUIVALENT) 324 MG: 324 (65 FE) TABLET DELAYED RESPONSE at 17:19

## 2019-06-04 RX ADMIN — OXYCODONE HYDROCHLORIDE AND ACETAMINOPHEN 500 MG: 500 TABLET ORAL at 08:53

## 2019-06-04 RX ADMIN — HYDRALAZINE HYDROCHLORIDE 25 MG: 25 TABLET, FILM COATED ORAL at 08:53

## 2019-06-04 RX ADMIN — MULTIPLE VITAMINS W/ MINERALS TAB 1 TABLET: TAB at 08:53

## 2019-06-04 RX ADMIN — LEVOTHYROXINE SODIUM 150 MCG: 75 TABLET ORAL at 05:28

## 2019-06-04 RX ADMIN — METOPROLOL TARTRATE 5 MG: 1 INJECTION, SOLUTION INTRAVENOUS at 10:34

## 2019-06-04 RX ADMIN — OXYCODONE HYDROCHLORIDE AND ACETAMINOPHEN 500 MG: 500 TABLET ORAL at 00:14

## 2019-06-04 ASSESSMENT — PAIN SCALES - GENERAL: PAINLEVEL_OUTOF10: 0
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
## 2019-06-05 ENCOUNTER — OUTSIDE FACILITY SERVICE (OUTPATIENT)
Dept: CARDIOLOGY | Facility: CLINIC | Age: 59
End: 2019-06-05

## 2019-06-05 PROBLEM — Z86.79 HISTORY OF ATRIAL FLUTTER: Status: ACTIVE | Noted: 2019-06-05

## 2019-06-05 LAB
ANION GAP SERPL CALCULATED.3IONS-SCNC: 12 MMOL/L (ref 3–16)
BUN BLDV-MCNC: 28 MG/DL (ref 6–20)
CALCIUM SERPL-MCNC: 9 MG/DL (ref 8.5–10.5)
CHLORIDE BLD-SCNC: 108 MMOL/L (ref 98–107)
CO2: 20 MMOL/L (ref 20–30)
CREAT SERPL-MCNC: 1.5 MG/DL (ref 0.4–1.2)
GFR AFRICAN AMERICAN: 43
GFR NON-AFRICAN AMERICAN: 35
GLUCOSE BLD-MCNC: 151 MG/DL (ref 74–106)
GLUCOSE BLD-MCNC: 157 MG/DL (ref 74–106)
GLUCOSE BLD-MCNC: 170 MG/DL (ref 74–106)
GLUCOSE BLD-MCNC: 202 MG/DL (ref 74–106)
GLUCOSE BLD-MCNC: 225 MG/DL (ref 74–106)
HCT VFR BLD CALC: 36.2 % (ref 37–47)
HEMOGLOBIN: 10.5 G/DL (ref 11.5–16.5)
MAGNESIUM: 1.8 MG/DL (ref 1.7–2.4)
MCH RBC QN AUTO: 24.9 PG (ref 27–32)
MCHC RBC AUTO-ENTMCNC: 29 G/DL (ref 31–35)
MCV RBC AUTO: 85.8 FL (ref 80–100)
ORGANISM: ABNORMAL
PDW BLD-RTO: 22.7 % (ref 11–16)
PERFORMED ON: ABNORMAL
PLATELET # BLD: 177 K/UL (ref 150–400)
PMV BLD AUTO: 10.3 FL (ref 6–10)
POTASSIUM SERPL-SCNC: 4 MMOL/L (ref 3.4–5.1)
RBC # BLD: 4.22 M/UL (ref 3.8–5.8)
SODIUM BLD-SCNC: 140 MMOL/L (ref 136–145)
TSH SERPL DL<=0.05 MIU/L-ACNC: 4.06 UIU/ML (ref 0.35–5.5)
URINE CULTURE, ROUTINE: ABNORMAL
URINE CULTURE, ROUTINE: ABNORMAL
WBC # BLD: 7.6 K/UL (ref 4–11)

## 2019-06-05 PROCEDURE — 97165 OT EVAL LOW COMPLEX 30 MIN: CPT

## 2019-06-05 PROCEDURE — 80048 BASIC METABOLIC PNL TOTAL CA: CPT

## 2019-06-05 PROCEDURE — 6360000002 HC RX W HCPCS: Performed by: PHYSICIAN ASSISTANT

## 2019-06-05 PROCEDURE — 99232 SBSQ HOSP IP/OBS MODERATE 35: CPT | Performed by: INTERNAL MEDICINE

## 2019-06-05 PROCEDURE — 1200000000 HC SEMI PRIVATE

## 2019-06-05 PROCEDURE — 36415 COLL VENOUS BLD VENIPUNCTURE: CPT

## 2019-06-05 PROCEDURE — 99222 1ST HOSP IP/OBS MODERATE 55: CPT | Performed by: INTERNAL MEDICINE

## 2019-06-05 PROCEDURE — 85027 COMPLETE CBC AUTOMATED: CPT

## 2019-06-05 PROCEDURE — 97530 THERAPEUTIC ACTIVITIES: CPT

## 2019-06-05 PROCEDURE — 6370000000 HC RX 637 (ALT 250 FOR IP): Performed by: INTERNAL MEDICINE

## 2019-06-05 PROCEDURE — 2580000003 HC RX 258: Performed by: PHYSICIAN ASSISTANT

## 2019-06-05 PROCEDURE — 6370000000 HC RX 637 (ALT 250 FOR IP): Performed by: PHYSICIAN ASSISTANT

## 2019-06-05 PROCEDURE — 94760 N-INVAS EAR/PLS OXIMETRY 1: CPT

## 2019-06-05 PROCEDURE — 51798 US URINE CAPACITY MEASURE: CPT

## 2019-06-05 PROCEDURE — 83735 ASSAY OF MAGNESIUM: CPT

## 2019-06-05 PROCEDURE — 84443 ASSAY THYROID STIM HORMONE: CPT

## 2019-06-05 RX ORDER — DILTIAZEM HYDROCHLORIDE 180 MG/1
360 CAPSULE, COATED, EXTENDED RELEASE ORAL DAILY
Status: DISCONTINUED | OUTPATIENT
Start: 2019-06-06 | End: 2019-06-06 | Stop reason: HOSPADM

## 2019-06-05 RX ORDER — HYDRALAZINE HYDROCHLORIDE 25 MG/1
25 TABLET, FILM COATED ORAL 2 TIMES DAILY PRN
Status: DISCONTINUED | OUTPATIENT
Start: 2019-06-05 | End: 2019-06-06 | Stop reason: HOSPADM

## 2019-06-05 RX ADMIN — ZINC SULFATE 220 MG (50 MG) CAPSULE 220 MG: CAPSULE at 08:50

## 2019-06-05 RX ADMIN — MEROPENEM 1 G: 1 INJECTION, POWDER, FOR SOLUTION INTRAVENOUS at 21:32

## 2019-06-05 RX ADMIN — INSULIN LISPRO 1 UNITS: 100 INJECTION, SOLUTION INTRAVENOUS; SUBCUTANEOUS at 21:42

## 2019-06-05 RX ADMIN — FERROUS SULFATE TAB EC 324 MG (65 MG FE EQUIVALENT) 324 MG: 324 (65 FE) TABLET DELAYED RESPONSE at 08:49

## 2019-06-05 RX ADMIN — MEROPENEM 1 G: 1 INJECTION, POWDER, FOR SOLUTION INTRAVENOUS at 14:51

## 2019-06-05 RX ADMIN — METOPROLOL TARTRATE 50 MG: 50 TABLET, FILM COATED ORAL at 08:50

## 2019-06-05 RX ADMIN — FERROUS SULFATE TAB EC 324 MG (65 MG FE EQUIVALENT) 324 MG: 324 (65 FE) TABLET DELAYED RESPONSE at 16:45

## 2019-06-05 RX ADMIN — ISOSORBIDE MONONITRATE 15 MG: 30 TABLET, EXTENDED RELEASE ORAL at 08:50

## 2019-06-05 RX ADMIN — ZINC SULFATE 220 MG (50 MG) CAPSULE 220 MG: CAPSULE at 21:33

## 2019-06-05 RX ADMIN — HYDRALAZINE HYDROCHLORIDE 25 MG: 25 TABLET, FILM COATED ORAL at 08:50

## 2019-06-05 RX ADMIN — INSULIN LISPRO 1 UNITS: 100 INJECTION, SOLUTION INTRAVENOUS; SUBCUTANEOUS at 08:56

## 2019-06-05 RX ADMIN — DILTIAZEM HYDROCHLORIDE 180 MG: 180 CAPSULE, COATED, EXTENDED RELEASE ORAL at 08:49

## 2019-06-05 RX ADMIN — ASPIRIN 81 MG 81 MG: 81 TABLET ORAL at 08:50

## 2019-06-05 RX ADMIN — INSULIN LISPRO 1 UNITS: 100 INJECTION, SOLUTION INTRAVENOUS; SUBCUTANEOUS at 16:47

## 2019-06-05 RX ADMIN — SIMVASTATIN 20 MG: 20 TABLET, FILM COATED ORAL at 21:33

## 2019-06-05 RX ADMIN — MULTIPLE VITAMINS W/ MINERALS TAB 1 TABLET: TAB at 08:50

## 2019-06-05 RX ADMIN — PROMETHAZINE HYDROCHLORIDE 12.5 MG: 25 INJECTION INTRAMUSCULAR; INTRAVENOUS at 23:30

## 2019-06-05 RX ADMIN — PANTOPRAZOLE SODIUM 40 MG: 40 TABLET, DELAYED RELEASE ORAL at 08:50

## 2019-06-05 RX ADMIN — METOPROLOL TARTRATE 50 MG: 50 TABLET, FILM COATED ORAL at 21:34

## 2019-06-05 RX ADMIN — MEROPENEM 1 G: 1 INJECTION, POWDER, FOR SOLUTION INTRAVENOUS at 05:40

## 2019-06-05 RX ADMIN — LEVOTHYROXINE SODIUM 150 MCG: 75 TABLET ORAL at 05:39

## 2019-06-05 RX ADMIN — ENOXAPARIN SODIUM 40 MG: 40 INJECTION SUBCUTANEOUS at 08:51

## 2019-06-05 RX ADMIN — INSULIN LISPRO 2 UNITS: 100 INJECTION, SOLUTION INTRAVENOUS; SUBCUTANEOUS at 12:17

## 2019-06-05 RX ADMIN — OXYCODONE HYDROCHLORIDE AND ACETAMINOPHEN 500 MG: 500 TABLET ORAL at 08:49

## 2019-06-05 ASSESSMENT — PAIN DESCRIPTION - PROGRESSION
CLINICAL_PROGRESSION: GRADUALLY WORSENING
CLINICAL_PROGRESSION: GRADUALLY WORSENING

## 2019-06-05 NOTE — CARE COORDINATION
06/05/19 1051   Discharge [place] Jodie Family Members;Home Care Staff   Current Services Prior To Admission [address] [facility]   DME Yaya Buitrago; Wheelchair;Bedside Commode  (RW, zaira lift, walker)   [facility]   [address] Medications No   Type of Home Care Services Nursing Services;PT;OT   Patient expects to be discharged to: [facility] referral    Expected Discharge Date 06/05/19   Referral sent to [facility]. Pt and pt family Danielle Novoa agreeable to go to [facility]. Awaiting call back from Jodie Max.

## 2019-06-05 NOTE — CARE COORDINATION
Patient on phone; attempted PT consult; patient refused at this time.   Electronically signed by Christopher Fonseca PT on 6/5/2019 at 4:40 PM

## 2019-06-05 NOTE — CARE COORDINATION
Bety Bailey from [facility] called earlier and stated family hadn't come to meet with her yet and would likely not be able to d/c to NH until tomorrow 6/6/19. Updated nurse and Aunkelli Castañeda.

## 2019-06-05 NOTE — CONSULTS
[facility] -        Cardiology Consult    Osorio Lanier  1960 June 5, 2019    Referring Physician: Jacky Meeks  Reason for Referral: Tachycardia    CC: Dysuria    HPI:  The patient is 59 y.o. female with a past medical history significant for hypertension, morbid obesity, untreated obstructive sleep apnea, right-sided heart failure due to chronic cor pulmonale, and type 2 diabetes mellitus who was evaluated 2 weeks ago for persistent tachycardia. The patient was felt to have sinus tachycardia and apparently arrangements were made for outpatient Holter monitoring. It is unclear if this was performed and I have not been able to find results of this monitor. However, review of her 12-lead electrocardiogram and telemetry monitor indicates that the patient has been in atrial flutter. She has no chest discomfort at rest or with activity. She has no orthopnea PND but has significant lower extremity edema which is apparently chronic. The patient was unaware that her heart was out of rhythm. She has no palpitations dizziness or syncope. The patient has no history of documented stroke or TIA. She does have mild to moderate chronic renal insufficiency. She was noted to have a urinary tract infection and has been on antibiotics. She is also noted to become progressively more anemic. She has been on a combination of hydralazine and long-acting oral nitrates for her underlying congestive heart failure. She has not been treated with ACE inhibitor therapy\angiotensin II receptor blockade due to chronic renal failure. Her left ventricular ejection fraction was normal by echocardiogram in October 2018.     Past Medical History:   Diagnosis Date    Acute metabolic encephalopathy     Acute renal failure (ARF) (HCC)     Anemia     Chronic venous insufficiency     GERD (gastroesophageal reflux disease) 12/2013    Severe Reflux Esophagitis and hemorrhagic gastritis    Headache(784.0)     Hypertension     Hypothyroidism     Thrombocytopenia ([facility] [address]     Type II or unspecified type diabetes mellitus without mention of complication, not stated as uncontrolled     Vitamin B12 deficiency      Past Surgical History:   Procedure Laterality Date    EYE SURGERY      LEG SURGERY      IN COLONOSCOPY FLX DX W/COLLJ SPEC WHEN PFRMD N/A 10/5/2018    COLONOSCOPY DIAGNOSTIC OR SCREENING performed by Destini Martinez MD at [address] EGD TRANSORAL BIOPSY SINGLE/MULTIPLE N/A 10/5/2018    EGD BIOPSY performed by Destini Martinez MD at [facility]ILDREN ENDOSCOPY     Family History   Problem Relation Age of Onset    Asthma Mother     High Blood Pressure Father     Stroke Father      Social History     Tobacco Use    Smoking status: Never Smoker    Smokeless tobacco: Never Used   Substance Use Topics    Alcohol use: No     Alcohol/week: 0.0 oz    Drug use: No       Allergies   Allergen Reactions    Metformin And Related      HA, diarrhea, throat swelling     Current Facility-Administered Medications   Medication Dose Route Frequency Provider Last Rate Last Dose    hydrALAZINE (APRESOLINE) tablet 25 mg  25 mg Oral BID PRN KAREN Silver        isosorbide mononitrate (IMDUR) extended release tablet 15 mg  15 mg Oral Daily Bao Leonardo MD   15 mg at 06/05/19 0850    pneumococcal polyvalent (PNEUMOVAX 23) inj 0.5 mL  0.5 mL Intramuscular Prior to discharge KAREN Silver        meropenem (MERREM) 1 g in sodium chloride 0.9 % 100 mL IVPB (mini-bag)  1 g Intravenous Q8H KAREN Silver   Stopped at 06/05/19 0729    acetaminophen (TYLENOL) tablet 650 mg  650 mg Oral Q6H PRN KAREN Silver        aspirin chewable tablet 81 mg  81 mg Oral Daily KAREN Silver   81 mg at 06/05/19 0850    metoprolol tartrate (LOPRESSOR) tablet 50 mg  50 mg Oral BID KAREN Silver   50 mg at 06/05/19 0850    diltiazem (CARDIZEM CD) extended release capsule 180 mg  180 mg Oral Daily KAREN Silver   180 mg at 06/05/19 0849    ferrous sulfate EC tablet 324 mg  324 mg Oral BID WC Delilah Motay, PA   324 mg at 06/05/19 0849    hydrALAZINE (APRESOLINE) tablet 25 mg  25 mg Oral BID Delilah Svetlanagustavo, PA   25 mg at 06/05/19 0850    levothyroxine (SYNTHROID) tablet 150 mcg  150 mcg Oral Daily Delilah Irwin, PA   150 mcg at 06/05/19 0539    lidocaine 4 % external patch 1 patch  1 patch Transdermal Daily Delilah Svetlanagustavo, PA   1 patch at 06/05/19 0854    therapeutic multivitamin-minerals 1 tablet  1 tablet Oral Daily Delilah Irwin, PA   1 tablet at 06/05/19 0850    pantoprazole (PROTONIX) tablet 40 mg  40 mg Oral Daily Delilah Irwin, PA   40 mg at 06/05/19 0850    polyethylene glycol (GLYCOLAX) powder 17 g  17 g Oral BID Delilah Irwin, PA   17 g at 06/03/19 2154    vitamin C (ASCORBIC ACID) tablet 500 mg  500 mg Oral Daily Delilah Irwin, PA   500 mg at 06/05/19 7812    saccharomyces boulardii (FLORASTOR) capsule 250 mg  250 mg Oral BID [facility] Svetlanay, PA        simvastatin (ZOCOR) tablet 20 mg  20 mg Oral Nightly Shashilisettee Svetlanay, PA   20 mg at 06/04/19 2040    zinc sulfate (ZINCATE) capsule 220 mg  220 mg Oral BID Festuse Dc, PA   220 mg at 06/05/19 0850    magnesium hydroxide (MILK OF MAGNESIA) 400 MG/5ML suspension 30 mL  30 mL Oral Daily PRN Shashilisettee Dc, PA        ondansetron [facility]) injection 4 mg  4 mg Intravenous Q6H PRN Mykelupe Svetlanay, PA   4 mg at 06/03/19 2146    enoxaparin (LOVENOX) injection 40 mg  40 mg Subcutaneous Daily Mykelreeaditi Irwin, PA   40 mg at 06/05/19 0851    glucose (GLUTOSE) 40 % oral gel 15 g  15 g Oral PRN Festuse Dc, PA        dextrose 50 % IV solution  12.5 g Intravenous PRN Delilah Irwin, PA        glucagon (rDNA) injection 1 mg  1 mg Intramuscular PRN Guadelupe Pavy, PA        dextrose 5 % solution  100 mL/hr Intravenous PRN KAREN Pizarro        insulin lispro (HUMALOG) injection vial 0-6 Units  0-6 Units Subcutaneous TID  KAREN Pizarro   2 Units at 06/05/19 1217    insulin lispro (HUMALOG) injection vial 0-3 Units  0-3 Units Subcutaneous Nightly KAREN Sebastian   1 Units at 06/04/19 2051    promethazine (PHENERGAN) injection 12.5 mg  12.5 mg Intravenous Q6H PRN KAREN Sebastian        benzonatate (TESSALON) capsule 200 mg  200 mg Oral BID PRN KAREN Sebastian   200 mg at 06/03/19 2142       Review of Systems:  Review of systems is as detailed above and otherwise negative to a 12 point review. Physical Exam:   /60   Pulse 120   Temp 98 °F (36.7 °C) (Oral)   Resp 18   Ht 5' 6\" (1.676 m)   Wt 284 lb 9.6 oz (129.1 kg)   SpO2 96%   BMI 45.94 kg/m²   Wt Readings from Last 3 Encounters:   06/05/19 284 lb 9.6 oz (129.1 kg)   05/29/19 255 lb (115.7 kg)   04/18/19 255 lb 4 oz (115.8 kg)     Constitutional: She is oriented to person, place, and time. She appears well-developed and well-nourished. In no acute distress. Head: Normocephalic and atraumatic. Pupils equal and round. Neck: Neck supple. No JVP or carotid bruit appreciated. No mass and no thyromegaly present. No lymphadenopathy present. Cardiovascular: Tachycardic rate. Regular rhythm. Normal heart sounds. Exam reveals no gallop and no friction rub. No murmur heard. Pulmonary/Chest: Effort normal and breath sounds normal. No respiratory distress. She has no wheezes, rhonchi or rales. Abdominal: Soft, non-tender. Bowel sounds are normal. She exhibits no organomegaly, mass or bruit. Extremities: 2+ bilateral pretibial edema, cyanosis, or clubbing. Pulses are 2+ radial/dorsalis pedis/posterior tibial/carotid bilaterally. Neurological: No gross cranial nerve deficit. Coordination normal.   Skin: Skin is warm and dry. There is no rash or diaphoresis. Psychiatric: She has a normal mood and affect.  Her speech is normal and behavior is normal.     Lab Review:   FLP:    Lab Results   Component Value Date    TRIG 96 02/04/2019    HDL 64 02/04/2019    LDLCALC 68 02/04/2019    LDLDIRECT 183 03/04/2015    LABVLDL 19 02/04/2019     BUN/Creatinine:    Lab Results   Component Value Date    BUN 28 06/05/2019    CREATININE 1.5 06/05/2019     EKG Interpretation: Atrial flutter with rapid ventricular response    Image Review: Agree with radiologist interpretation    Assessment/Plan:   The patient has apparently had uncontrollable narrow complex tachycardia for the last several weeks. This was felt to be sinus tachycardia. Review of her telemetry tracings and 12-lead electrocardiogram demonstrates this to actually be atrial flutter. Atrial flutter is notoriously hard for rate control. It does not respond favorably to antiarrhythmic drug therapy. She is asymptomatic from a cardiovascular standpoint but has probably had some deterioration in chronic left ventricular diastolic heart failure. She has significant peripheral edema. She is hemodynamically stable and somewhat hypertensive. I would recommend discontinuation of hydralazine. I would recommend increasing her long-acting diltiazem to 360 mg orally once per day. I would recommend restarting her Toprol-XL at  mg orally once per day. Low-dose digoxin therapy adjusted for renal insufficiency is another option. I would recommend long-term anticoagulation therapy with Eliquis 5 mg orally twice per day. I would recommend referral to [facility] for EP evaluation as her atrial flutter could be \"cured\" with ablation. Again medical therapy with rate control is extremely difficult to achieve particularly given that the patient has an underlying urinary tract infection as well as significant anemia. These tend to act as stimuli for rate acceleration similar to sinus tachycardia. If the patient has not had recent thyroid function studies performed I would recommend a TSH and a free thyroxine index.   Prior to starting chronic anticoagulation therapy I would recommend that the patient have stool guaiacs checked to rule out the possibility of occult GI blood loss.  If she is demonstrated to have evidence of GI bleeding anticoagulation therapy would of course be contraindicated. I had intended to discuss with the patient the option of flutter ablation as this is clearly the best solution. Unfortunately, the patient refused to get off the telephone and indicated that I could come back and talk to her at a later time. Thank you very much for allowing me to participate in the care of your patient. Please do not hesitate to contact me if you have any questions.     Electronically Signed by Clarissa Breen MD on 6/5/2019 at 2:15 PM

## 2019-06-05 NOTE — CARE COORDINATION
[address] called regarding pt referral. Pt will need holter monitor as outpt for 48 hrs so she will check if that is allowed. pt will need to be seen by cardiology this afternoon prior to discharge but will be stable to d/c after. Pankaj states she will notify family and try to get them to meet with her for paperwork and to see if they are agreeable to financial terms. She will update me after talking with family.

## 2019-06-05 NOTE — PROGRESS NOTES
1  Current Treatment Recommendations: Strengthening, Balance Training, Endurance Training, Self-Care / ADL, Safety Education & Training, Functional Mobility Training    Goals  Short term goals  Time Frame for Short term goals: 1 week  Short term goal 1: Pt to come to stand with CGA. Short term goal 2: Pt to complete SPT from bed to [facility] with min assist.   Short term goal 3: pt to tolerate x15 minutes of activity to increase independence with ADL tasks. Short term goal 4: Pt to complete bed mobility with MOD I. Therapy Time   Individual Concurrent Group Co-treatment   Time In 1004         Time Out 0927         Minutes 23              This note serves as a DC summary in the event of pt discharge.      Dedra Schumacher, OTR/L

## 2019-06-05 NOTE — CARE COORDINATION
Discharge planning discussed with pt at bedside. Pt is agreeable to go to nursing home at this time and would like [facility]. Called Alecia Wade at [facility] and faxed referral to [phone]. Updated pt JO ANN Lozada and she is agreeable to this plan of care.

## 2019-06-05 NOTE — PLAN OF CARE
Problem: Safety:  Goal: Free from accidental physical injury  Description  Free from accidental physical injury  Outcome: Ongoing     Problem: Daily Care:  Goal: Daily care needs are met  Description  Daily care needs are met  Outcome: Ongoing     Problem: Falls - Risk of:  Goal: Will remain free from falls  Description  Will remain free from falls  Outcome: Ongoing

## 2019-06-05 NOTE — PROGRESS NOTES
Progress Note      Subjective:   Chief complaint: nausea, vomiting    Interval History:   Patient talking on the phone to her sister Leo Saini today. She has no complaits and seems to be feeling much better compared to when she came in. Does nto hang up the phone for exam. She is smiling and pleasant. Denies nausea or vomiting. Denies cough. No abdominal pain. Agreeable to nursing home placement now. Review of systems:   Constitutional:  Denies fever or chills . admits to chronic fatigue. Eyes:  Denies eye pain or redness  HENT:  Denies nasal congestion or sore throat   Respiratory:  Denies cough or shortness of breath   Cardiovascular:  Denies chest pain. Positive for lower extremity edema   GI:  Denies abdominal pain,   bloody stools or diarrhea, nausea, vmoiting   :  Denies dysuria or frequency. Musculoskeletal:  Denies acute neck pain or body aches  Integument:  Denies rash or itching  Neurologic:  Denies headache, dizziness, numbness, tingling or unilateral weakness  Psychiatric:  Denies acute depression or acute anxiety        Past medical history, surgical history, family history and social history reviewed and unchanged compared to H&P earlier this admission.     Medications:   Scheduled Meds:   isosorbide mononitrate  15 mg Oral Daily    pneumococcal polyvalent  0.5 mL Intramuscular Prior to discharge    meropenem  1 g Intravenous Q8H    aspirin  81 mg Oral Daily    metoprolol tartrate  50 mg Oral BID    diltiazem  180 mg Oral Daily    ferrous sulfate  324 mg Oral BID WC    hydrALAZINE  25 mg Oral BID    levothyroxine  150 mcg Oral Daily    lidocaine  1 patch Transdermal Daily    therapeutic multivitamin-minerals  1 tablet Oral Daily    pantoprazole  40 mg Oral Daily    polyethylene glycol  17 g Oral BID    ascorbic acid  500 mg Oral Daily    saccharomyces boulardii  250 mg Oral BID    simvastatin  20 mg Oral Nightly    zinc sulfate  220 mg Oral BID    enoxaparin  40 mg Subcutaneous Daily    insulin lispro  0-6 Units Subcutaneous TID WC    insulin lispro  0-3 Units Subcutaneous Nightly     Continuous Infusions:   dextrose         Objective:     Vital Signs  Temp: 98 °F (36.7 °C)  Pulse: 120  Resp: 18  BP: 121/60  SpO2: 96 %  O2 Device: None (Room air)       Vital signs reviewed in electronic charts. Physical exam  Constitutional:  Well developed, obese, no acute distress  Eyes:  PERRL, no scleral icterus, conjunctiva normal   HENT:  Atraumatic, external ears normal, nose normal, oropharynx moist, no pharyngeal exudates. Neck- supple, no JVD, no lymphadenopathy  Respiratory:  No respiratory distress on room air, no wheezing, rales or rhonchi detected, diminished at bases   Cardiovascular:  tachycardic, normal rhythm, no murmurs, no gallops, no rubs, +1 edema BLE   GI:  Soft, obese, nondistended, normal bowel sounds, nontender, no voluntary guarding  Musculoskeletal:  No cyanosis or obvious acute deformity. Moving all extremities. Chronic weakness BLE and she is nonambulatory. Integument:  Warm and dry. Chronic skin changes BLE and hyperkeratosis noted. Neurologic:  Alert & oriented x 3, no apparent focal deficits noted   Psychiatric:  Speech and behavior appropriate            Results:     Lab Results   Component Value Date    WBC 7.6 06/05/2019    HGB 10.5 (L) 06/05/2019    HCT 36.2 (L) 06/05/2019    MCV 85.8 06/05/2019     06/05/2019       Lab Results   Component Value Date     06/05/2019    K 4.0 06/05/2019    K 4.6 06/04/2019     06/05/2019    CO2 20 06/05/2019    BUN 28 06/05/2019    CREATININE 1.5 06/05/2019    GLUCOSE 157 06/05/2019    CALCIUM 9.0 06/05/2019        Assessment and Plan: Active Hospital Problems    Diagnosis Date Noted    History of atrial flutter [Z86.79]  HR remaining in 120s and per Dr. Braden Fraga patient in atrial flutter which is consistent with her history per chart review.  eliquis discontinued related to coffee ground emesis and 6/5/2019 at 2:31 PM

## 2019-06-06 VITALS
HEIGHT: 66 IN | OXYGEN SATURATION: 94 % | HEART RATE: 121 BPM | DIASTOLIC BLOOD PRESSURE: 88 MMHG | WEIGHT: 283.4 LBS | SYSTOLIC BLOOD PRESSURE: 146 MMHG | BODY MASS INDEX: 45.55 KG/M2 | RESPIRATION RATE: 20 BRPM | TEMPERATURE: 98.2 F

## 2019-06-06 LAB
ANION GAP SERPL CALCULATED.3IONS-SCNC: 14 MMOL/L (ref 3–16)
BUN BLDV-MCNC: 26 MG/DL (ref 6–20)
CALCIUM SERPL-MCNC: 9.2 MG/DL (ref 8.5–10.5)
CHLORIDE BLD-SCNC: 106 MMOL/L (ref 98–107)
CO2: 19 MMOL/L (ref 20–30)
CREAT SERPL-MCNC: 1.6 MG/DL (ref 0.4–1.2)
GFR AFRICAN AMERICAN: 40
GFR NON-AFRICAN AMERICAN: 33
GLUCOSE BLD-MCNC: 138 MG/DL (ref 74–106)
GLUCOSE BLD-MCNC: 147 MG/DL (ref 74–106)
GLUCOSE BLD-MCNC: 163 MG/DL (ref 74–106)
HCT VFR BLD CALC: 37 % (ref 37–47)
HEMOGLOBIN: 11.2 G/DL (ref 11.5–16.5)
MAGNESIUM: 1.9 MG/DL (ref 1.7–2.4)
MCH RBC QN AUTO: 25.6 PG (ref 27–32)
MCHC RBC AUTO-ENTMCNC: 30.3 G/DL (ref 31–35)
MCV RBC AUTO: 84.5 FL (ref 80–100)
PDW BLD-RTO: 22.4 % (ref 11–16)
PERFORMED ON: ABNORMAL
PERFORMED ON: ABNORMAL
PLATELET # BLD: 168 K/UL (ref 150–400)
PMV BLD AUTO: 9.8 FL (ref 6–10)
POTASSIUM SERPL-SCNC: 4.3 MMOL/L (ref 3.4–5.1)
RBC # BLD: 4.38 M/UL (ref 3.8–5.8)
SODIUM BLD-SCNC: 139 MMOL/L (ref 136–145)
WBC # BLD: 6.9 K/UL (ref 4–11)

## 2019-06-06 PROCEDURE — 36415 COLL VENOUS BLD VENIPUNCTURE: CPT

## 2019-06-06 PROCEDURE — 80048 BASIC METABOLIC PNL TOTAL CA: CPT

## 2019-06-06 PROCEDURE — 2580000003 HC RX 258: Performed by: PHYSICIAN ASSISTANT

## 2019-06-06 PROCEDURE — 83735 ASSAY OF MAGNESIUM: CPT

## 2019-06-06 PROCEDURE — 94760 N-INVAS EAR/PLS OXIMETRY 1: CPT

## 2019-06-06 PROCEDURE — 93226 XTRNL ECG REC<48 HR SCAN A/R: CPT

## 2019-06-06 PROCEDURE — 6370000000 HC RX 637 (ALT 250 FOR IP): Performed by: INTERNAL MEDICINE

## 2019-06-06 PROCEDURE — 6370000000 HC RX 637 (ALT 250 FOR IP): Performed by: PHYSICIAN ASSISTANT

## 2019-06-06 PROCEDURE — 6360000002 HC RX W HCPCS: Performed by: PHYSICIAN ASSISTANT

## 2019-06-06 PROCEDURE — C1751 CATH, INF, PER/CENT/MIDLINE: HCPCS

## 2019-06-06 PROCEDURE — 93225 XTRNL ECG REC<48 HRS REC: CPT

## 2019-06-06 PROCEDURE — 99238 HOSP IP/OBS DSCHRG MGMT 30/<: CPT | Performed by: INTERNAL MEDICINE

## 2019-06-06 PROCEDURE — 02HV33Z INSERTION OF INFUSION DEVICE INTO SUPERIOR VENA CAVA, PERCUTANEOUS APPROACH: ICD-10-PCS | Performed by: INTERNAL MEDICINE

## 2019-06-06 PROCEDURE — 36569 INSJ PICC 5 YR+ W/O IMAGING: CPT

## 2019-06-06 PROCEDURE — 85027 COMPLETE CBC AUTOMATED: CPT

## 2019-06-06 RX ORDER — SODIUM CHLORIDE 0.9 % (FLUSH) 0.9 %
10 SYRINGE (ML) INJECTION PRN
Status: DISCONTINUED | OUTPATIENT
Start: 2019-06-06 | End: 2019-06-06 | Stop reason: HOSPADM

## 2019-06-06 RX ORDER — DILTIAZEM HYDROCHLORIDE 360 MG/1
360 CAPSULE, EXTENDED RELEASE ORAL DAILY
Qty: 30 CAPSULE | Refills: 3 | Status: ON HOLD | OUTPATIENT
Start: 2019-06-07 | End: 2019-08-13

## 2019-06-06 RX ORDER — MEROPENEM 1 G/1
1 INJECTION, POWDER, FOR SOLUTION INTRAVENOUS EVERY 8 HOURS
Qty: 24 G | Refills: 0 | Status: SHIPPED | OUTPATIENT
Start: 2019-06-06 | End: 2019-06-14

## 2019-06-06 RX ORDER — SODIUM CHLORIDE 0.9 % (FLUSH) 0.9 %
10 SYRINGE (ML) INJECTION EVERY 12 HOURS SCHEDULED
Status: DISCONTINUED | OUTPATIENT
Start: 2019-06-06 | End: 2019-06-06 | Stop reason: HOSPADM

## 2019-06-06 RX ORDER — LEVOTHYROXINE SODIUM 0.15 MG/1
150 TABLET ORAL DAILY
Qty: 30 TABLET | Refills: 3 | Status: SHIPPED | OUTPATIENT
Start: 2019-06-07 | End: 2020-01-29 | Stop reason: SDUPTHER

## 2019-06-06 RX ORDER — LIDOCAINE HYDROCHLORIDE 10 MG/ML
5 INJECTION, SOLUTION EPIDURAL; INFILTRATION; INTRACAUDAL; PERINEURAL ONCE
Status: DISCONTINUED | OUTPATIENT
Start: 2019-06-06 | End: 2019-06-06 | Stop reason: HOSPADM

## 2019-06-06 RX ADMIN — ASPIRIN 81 MG 81 MG: 81 TABLET ORAL at 09:03

## 2019-06-06 RX ADMIN — OXYCODONE HYDROCHLORIDE AND ACETAMINOPHEN 500 MG: 500 TABLET ORAL at 09:03

## 2019-06-06 RX ADMIN — ISOSORBIDE MONONITRATE 15 MG: 30 TABLET, EXTENDED RELEASE ORAL at 09:04

## 2019-06-06 RX ADMIN — DILTIAZEM HYDROCHLORIDE 360 MG: 180 CAPSULE, COATED, EXTENDED RELEASE ORAL at 09:03

## 2019-06-06 RX ADMIN — PANTOPRAZOLE SODIUM 40 MG: 40 TABLET, DELAYED RELEASE ORAL at 09:03

## 2019-06-06 RX ADMIN — MULTIPLE VITAMINS W/ MINERALS TAB 1 TABLET: TAB at 09:03

## 2019-06-06 RX ADMIN — MEROPENEM 1 G: 1 INJECTION, POWDER, FOR SOLUTION INTRAVENOUS at 05:24

## 2019-06-06 RX ADMIN — INSULIN LISPRO 1 UNITS: 100 INJECTION, SOLUTION INTRAVENOUS; SUBCUTANEOUS at 11:15

## 2019-06-06 RX ADMIN — FERROUS SULFATE TAB EC 324 MG (65 MG FE EQUIVALENT) 324 MG: 324 (65 FE) TABLET DELAYED RESPONSE at 09:03

## 2019-06-06 RX ADMIN — ENOXAPARIN SODIUM 40 MG: 40 INJECTION SUBCUTANEOUS at 09:02

## 2019-06-06 RX ADMIN — ZINC SULFATE 220 MG (50 MG) CAPSULE 220 MG: CAPSULE at 09:03

## 2019-06-06 RX ADMIN — HYDRALAZINE HYDROCHLORIDE 25 MG: 25 TABLET, FILM COATED ORAL at 09:09

## 2019-06-06 RX ADMIN — LEVOTHYROXINE SODIUM 150 MCG: 75 TABLET ORAL at 05:24

## 2019-06-06 RX ADMIN — MEROPENEM 1 G: 1 INJECTION, POWDER, FOR SOLUTION INTRAVENOUS at 13:31

## 2019-06-06 RX ADMIN — METOPROLOL TARTRATE 50 MG: 50 TABLET, FILM COATED ORAL at 09:03

## 2019-06-06 ASSESSMENT — PAIN DESCRIPTION - PAIN TYPE: TYPE: ACUTE PAIN

## 2019-06-06 ASSESSMENT — PAIN SCALES - GENERAL: PAINLEVEL_OUTOF10: 2

## 2019-06-06 ASSESSMENT — PAIN DESCRIPTION - LOCATION: LOCATION: ABDOMEN

## 2019-06-06 NOTE — PROGRESS NOTES
I did see and examine the patient with JOSE Chase. The case was discussed with him. Please refer to his note for details. Patient was admitted with nausea, vomiting and dizziness. Patient was found to be hyperkalemic with potassium level of 6 on arrival. Also was hypertensive and tachycardic. Hyperkalemia improved with appropriate diet in addition to the use of Kayexalate. Renal function remained stable for her. Tachycardia persist despite titrating rate control medicine. Patient has improved and was discharged in stable condition. Refused to go back to the nursing home.  Further recommendations/details per Sathya's note

## 2019-06-06 NOTE — DISCHARGE SUMMARY
Discharge Summary      Patient ID: [place] Speak      Patient's PCP: JOSE Carrero    Admit Date: 6/3/2019     Discharge Date:  6/6/2019     Admitting Provider: Kyleigh Martinez MD    Discharging Provider: KAREN Longoria     Reason for this admission:   UTI     Discharge Diagnoses: Active Hospital Problems    Diagnosis Date Noted    History of atrial flutter [Z86.79] 06/05/2019    Declining functional status [R53.81] 06/04/2019    Acute cystitis without hematuria [N30.00] 06/03/2019    CKD (chronic kidney disease) [N18.9]     Anemia [D64.9] 10/02/2018    Essential hypertension [I10]     Type 2 diabetes mellitus with complication (HCC) [J86.7]     Hypothyroidism [E03.9]        Procedures:  XR CHEST PORTABLE   Final Result      Cardiac enlargement with perihilar interstitial changes and vascular congestion worrisome for edema. Groundglass opacity in the left midlung possibly edema or developing pneumonitis                  CT ABDOMEN PELVIS WO CONTRAST Additional Contrast? None   Final Result      Increase in ascites small volume perihepatic, perisplenic and paracolic spaces and small-moderate volume in pelvis. Development of small volume left superior perinephric edema or small fluid collection. No change in diffuse abdominal wall anasarca. No change in minimal bilateral pleural effusions. Decrease in scattered adenopathy with largest remaining node left retrocrural borderline enlarged 1 cm x 1.5 cm and left external iliac-proximal inguinal 0.8 cm x 2.5 cm. CT Head WO Contrast   Final Result    No evidence of an acute intracranial hemorrhage, mass effect or midline shift. Consults:   IP CONSULT TO CARDIOLOGY  PT/OT    Briefly:   Ms. Janie Mason is a 58 yo female who presented due to urinary incontinence and generalized weakness. Was found to have UTI.  Due to declining functional status and need for continued antibiotic therapy she is being discharged to [facility] nursing home. Hospital Course: Active Hospital Problems    Diagnosis Date Noted    History of atrial flutter [Z86.79]  HR consistently in 120s despite recent addition of diltiazem and being on BB. Cardiology consulted and recommended increasing diltiazem which was done. Per cardiology patient would likely benefit from an ablation as an outpatient which she is agreeable to being referred for. Regarding anticoagulation her eliquis  Was discontinued related to coffee ground emesis and anemia in March (see dc summary [facility] in media) and it was recommended she have capsule endoscopy prior to resuming anticoagulation. Patient reports she had capsule endoscopy last month at either [facility], [place] [address], or [facility], [place] but these facilities do not have record of the procedure. Need to obtain record from procedure and review prior to restarting anticoagulation. If she has not had this procedure nursing facility to refer to GI for scheduling. Continue ASA. 06/05/2019    Declining functional status [R53.81]  She is unable to care for herself and poor functional status. She will be discharged to nursing facility for ongoing antibiotic therapy and subacute rehabilitation. 06/04/2019    Acute cystitis without hematuria [N30.00]  Urine cx + Ecoli ESBL. Will complete 10 days of IV Merrem. Discontinue midline when antibiotics complete. 06/03/2019    CKD (chronic kidney disease) [N18.9]  Renal function actually improved from previous and has remained stable. Repeat bmp in 2 weeks. Follow up with nephrology as scheduled.  Anemia [D64.9]  Hgb stable. Iron studies, folate, b12 at [facility], [place] 4/30/19 unremarkable. Had egd and colonoscopy in 2018 which revealed gastritis and internal hemorrhoids. has been recommended she proceed with capsule endoscopy and nursing home to find report from this procedure as above. On GI ppx.    10/02/2018    Essential hypertension [I10]  BP stable on current regimen      Type 2 diabetes mellitus with complication ([facility] [address] [L12.3]  Monitor fsbg achs and cover with low dose ssi at nursing facility. Diabetic diet.  Hypothyroidism [E03.9]  TSH within normal limits. Continue current synthroid dose 150 mcg. Vital Signs  Temp: 98.2 °F (36.8 °C)  Pulse: 121  Resp: 20  BP: (!) 146/88  SpO2: 94 %  O2 Device: None (Room air)       Vital signs reviewed in electronic chart. Physical exam  Constitutional:  Well developed, obese, no acute distress  Eyes:  PERRL, no scleral icterus, conjunctiva normal   HENT:  Atraumatic, external ears normal, nose normal, oropharynx moist, no pharyngeal exudates. Neck- supple, no JVD, no lymphadenopathy  Respiratory:  No respiratory distress on room air, no wheezing, rales or rhonchi detected, diminished at bases   Cardiovascular:  tachycardic, normal rhythm, no murmurs, no gallops, no rubs, +1 edema BLE   GI:  Soft, obese, nondistended, normal bowel sounds, nontender, no voluntary guarding  Musculoskeletal:  No cyanosis or obvious acute deformity. Moving all extremities. Chronic weakness BLE and she is nonambulatory.    Integument:  Warm and dry. Chronic skin changes BLE and hyperkeratosis noted.   Neurologic:  Alert & oriented x 3, no apparent focal deficits noted   Psychiatric:  Speech and behavior appropriate     Disposition: SNF  Discharged Condition: Stable  Activity: activity as tolerated  Diet: cardiac diet and diabetic diet  Follow Up: Primary Care Physician in 1 week. Follow up with [facility] cardiology electrophysiology in 2 weeks. Follow up with nephrology as scheduled. Patient needs to have a capsule endoscopy if not done previously. We have not been able to locate the record for this procedure patient reports she had 1 month ago at [facility], [facility], or [facility].  When she arrives at the nursing facility please obtain record from recent capsule endoscopy and send to Dr. Luli Arnold office or if it hasn't been done previously please schedule her for this procedure. Patient to proceed with the following lab (CBC, BMP) in 2 weeks    Labs: For convenience and continuity at follow-up the following most recent labs are provided:    CBC:   Lab Results   Component Value Date    WBC 6.9 06/06/2019    HGB 11.2 06/06/2019    HCT 37.0 06/06/2019     06/06/2019       RENAL:   Lab Results   Component Value Date     06/06/2019    K 4.3 06/06/2019    K 4.6 06/04/2019     06/06/2019    CO2 19 06/06/2019    BUN 26 06/06/2019    CREATININE 1.6 06/06/2019         Discharge Medications:     Current Discharge Medication List           Details   meropenem (MERREM) 1 g injection Infuse 1 g intravenously every 8 hours for 8 days  Qty: 24 g, Refills: 0              Details   diltiazem (CARDIZEM CD) 360 MG extended release capsule Take 1 capsule by mouth daily  Qty: 30 capsule, Refills: 3      levothyroxine (SYNTHROID) 150 MCG tablet Take 1 tablet by mouth Daily  Qty: 30 tablet, Refills: 3              Details   !! blood glucose monitor strips Test 3 times a day & as needed for symptoms of irregular blood glucose. Dx E11.9 Freestyle Lite  Qty: 100 strip, Refills: 5    Comments: Brand per patient preference. May round up to next available package size. aspirin 81 MG chewable tablet Take 81 mg by mouth daily      isosorbide mononitrate (ISMO;MONOKET) 10 MG tablet Take 15 mg by mouth daily      Skin Protectants, Misc. (DIMETHICONE-ZINC OXIDE) cream Apply 1 Dose topically as needed for Dry Skin (for redness) Apply topically 2 times daily as needed. bisoprolol (ZEBETA) 10 MG tablet Take 1 tablet by mouth 2 times daily  Qty: 60 tablet, Refills: 0      lidocaine (LIDODERM) 5 % Place 1 patch onto the skin daily Apply to left knee every morning and remove every evening (12 hours on, 12 hours off).       Ferrous Sulfate (IRON) 325 (65 Fe) MG TABS take 1 tablet by mouth twice a day with food  Qty: 60 tablet, Refills: 5      pantoprazole (PROTONIX) 40 [phone].

## 2019-06-06 NOTE — PLAN OF CARE
Problem: Safety:  Goal: Free from accidental physical injury  Description  Free from accidental physical injury  6/6/2019 0121 by Dolly Sánchez RN  Outcome: Ongoing  6/5/2019 1323 by Chilo Butler RN  Outcome: Ongoing     Problem: Daily Care:  Goal: Daily care needs are met  Description  Daily care needs are met  6/6/2019 0121 by Dolly Sánchez RN  Outcome: Ongoing  6/5/2019 1323 by Chilo Butler RN  Outcome: Ongoing     Problem: Falls - Risk of:  Goal: Will remain free from falls  Description  Will remain free from falls  6/5/2019 1323 by Chilo Butler RN  Outcome: Ongoing

## 2019-06-06 NOTE — CARE COORDINATION
Spoke with Ce Lawler from [facility] and [address] [place] [address]. Has accepted pt to be admitted today. Spoke with Ángela Garza., PA. Plans to dc pt today after midline IV insertion for continuation of IV antibiotics.

## 2019-06-06 NOTE — PROGRESS NOTES
Placed call to [facility] for transport back to the nursing [place].

## 2019-06-06 NOTE — PROGRESS NOTES
Discharge instructions reviewed with Patient, reviewed follow up appt, new, changed and stopped medications, and when next doses are due. Report given to Maye Santacruz at [facility]. IV removed, site asymptomatic, dressing applied, no bleeding noted. Patient loaded onto EMS stretcher. No distress noted on DC.

## 2019-06-08 LAB — BLOOD CULTURE, ROUTINE: NORMAL

## 2019-06-11 RX ORDER — BLOOD-GLUCOSE METER
1 KIT MISCELLANEOUS DAILY PRN
Qty: 1 KIT | Refills: 0 | Status: SHIPPED | OUTPATIENT
Start: 2019-06-11 | End: 2020-01-16 | Stop reason: SDUPTHER

## 2019-06-12 ENCOUNTER — OUTSIDE FACILITY SERVICE (OUTPATIENT)
Dept: CARDIOLOGY | Facility: CLINIC | Age: 59
End: 2019-06-12

## 2019-06-12 PROCEDURE — 93227 XTRNL ECG REC<48 HR R&I: CPT | Performed by: INTERNAL MEDICINE

## 2019-06-14 ENCOUNTER — OFFICE VISIT (OUTPATIENT)
Dept: PRIMARY CARE CLINIC | Age: 59
End: 2019-06-14
Payer: MEDICARE

## 2019-06-14 DIAGNOSIS — D64.9 ANEMIA, UNSPECIFIED TYPE: ICD-10-CM

## 2019-06-14 DIAGNOSIS — E11.8 TYPE 2 DIABETES MELLITUS WITH COMPLICATION, UNSPECIFIED WHETHER LONG TERM INSULIN USE: ICD-10-CM

## 2019-06-14 DIAGNOSIS — N39.0 UTI DUE TO EXTENDED-SPECTRUM BETA LACTAMASE (ESBL) PRODUCING ESCHERICHIA COLI: Primary | ICD-10-CM

## 2019-06-14 DIAGNOSIS — Z16.12 UTI DUE TO EXTENDED-SPECTRUM BETA LACTAMASE (ESBL) PRODUCING ESCHERICHIA COLI: Primary | ICD-10-CM

## 2019-06-14 DIAGNOSIS — B96.29 UTI DUE TO EXTENDED-SPECTRUM BETA LACTAMASE (ESBL) PRODUCING ESCHERICHIA COLI: Primary | ICD-10-CM

## 2019-06-14 DIAGNOSIS — I48.92 ATRIAL FLUTTER, UNSPECIFIED TYPE (HCC): ICD-10-CM

## 2019-06-14 DIAGNOSIS — E03.9 HYPOTHYROIDISM, UNSPECIFIED TYPE: ICD-10-CM

## 2019-06-14 DIAGNOSIS — I10 ESSENTIAL HYPERTENSION: ICD-10-CM

## 2019-06-14 DIAGNOSIS — N18.30 STAGE 3 CHRONIC KIDNEY DISEASE (HCC): ICD-10-CM

## 2019-06-14 PROCEDURE — 3045F PR MOST RECENT HEMOGLOBIN A1C LEVEL 7.0-9.0%: CPT | Performed by: INTERNAL MEDICINE

## 2019-06-14 PROCEDURE — 99308 SBSQ NF CARE LOW MDM 20: CPT | Performed by: INTERNAL MEDICINE

## 2019-06-20 ENCOUNTER — HOSPITAL ENCOUNTER (OUTPATIENT)
Facility: HOSPITAL | Age: 59
Discharge: HOME | End: 2019-06-20
Payer: MEDICARE

## 2019-06-20 LAB
AMORPHOUS: ABNORMAL /HPF
BILIRUBIN URINE: NEGATIVE
BLOOD, URINE: ABNORMAL
CLARITY: CLEAR
COLOR: YELLOW
EPITHELIAL CELLS, UA: ABNORMAL /HPF
GLUCOSE URINE: NEGATIVE MG/DL
KETONES, URINE: NEGATIVE MG/DL
LEUKOCYTE ESTERASE, URINE: NEGATIVE
MICROSCOPIC EXAMINATION: YES
NITRITE, URINE: NEGATIVE
PH UA: 5.5 (ref 5–8)
PROTEIN UA: >=300 MG/DL
RBC UA: ABNORMAL /HPF (ref 0–2)
SPECIFIC GRAVITY UA: 1.02 (ref 1–1.03)
URINE REFLEX TO CULTURE: ABNORMAL
URINE TYPE: ABNORMAL
UROBILINOGEN, URINE: 0.2 E.U./DL
WBC UA: ABNORMAL /HPF (ref 0–5)

## 2019-06-20 PROCEDURE — 81001 URINALYSIS AUTO W/SCOPE: CPT

## 2019-06-22 NOTE — PROGRESS NOTES
[address]   1960 05/20/19      CHIEF COMPLAINT:    Patient is resident at the facility. Patient is due for a follow-up visit on medical problems which include declining functional status, diabetes, chronic kidney disease, hypertension and others    HPI:   The patient is a 78-year-old female with a past medical history significant for diabetes, chronic kidney disease, hypertension and others who is a resident of [place] [address]. Patient is due for a follow-up visit. Patient was recently admitted to [facility] where she was treated for hyperkalemia, ARF and UTI. Completed antibiotic therapy for UTI and had improving renal function that admission. Patient reports doing well overall. Reports tolerating oral intake. Reports having bowel movement. Fingersticks elevated at times upper 200s in chart. Vitals stable. Takes medications as directed. No SE reported. Patient's past medical, surgical,social and family histories were reviewed as documented in previous note/chart. Medicationlist and allergies reviewed as documented in the paper chart of the facility. Review of Systems  Constitutional:  Denies fever or chills   Eyes:  Denies eye pain or redness  HENT:  Denies nasal congestion or sore throat   Respiratory:  Denies cough or shortness of breath   Cardiovascular:  Denies chest pain or palpitation  GI:  Denies abdominal pain, nausea, vomiting or diarrhea   :  Denies dysuria or frequency  Musculoskeletal:  Denies acute neck pain or body aches.  Positive for chronic arthralgias  Integument:  Denies rash or itching  Neurologic:  Denies headache, dizziness or unilateral weakness  Psychiatric:  Denies acute depression or acute anxiety    Vital Signs  /76  Temperature 98.2°F  Respiration 1  HR 68    Physical Exam  Constitutional:  Well developed, morbidly obese, no acute distress  Eyes:  PERRL, no scleral icterus, conjunctiva normal   HENT:  Atraumatic, external ears normal, nose normal, oropharynx moist, no pharyngeal exudates. Neck- supple, no JVD, no lymphadenopathy  Respiratory:  No respiratory distress, no wheezing, rales or rhonchi detected  Cardiovascular:  Normal rate, normal rhythm, no murmurs, no gallops, no rubs, no edema   GI:  Soft, obese, normal bowel sounds, nontender, no voluntary guarding  Musculoskeletal:  No cyanosis or obvious acute deformity. Moving all extremities   Integument:  Warm and dry. Chronic skin changes BLE. Some dried, cracked areas BLE. No active cellulitis noted  Neurologic:  Alert & oriented x 3, no apparent focal deficits noted   Psychiatric:  Speech and behavior appropriate     Component Value Ref Range & Units Status Collected Lab   WBC 5.8  4.0 - 11.0 K/uL Final 04/29/2019  9:00 AM [facility]   RBC 3.58Low   3.80 - 5.80 M/uL Final 04/29/2019  9:00 AM [facility]   Hemoglobin 9.0Low   11.5 - 16.5 g/dL Final 04/29/2019  9:00 AM [facility]   Hematocrit 30. 0Low   37.0 - 47.0 % Final 04/29/2019  9:00 AM [facility]   MCV 83.8  80.0 - 100.0 fL Final 04/29/2019  9:00 AM [facility]   MCH 25.1Low   27.0 - 32.0 pg Final 04/29/2019  9:00 AM [facility]   MCHC 30.0Low   31.0 - 35.0 g/dL Final 04/29/2019  9:00 AM [facility]   RDW 18.6High   11.0 - 16.0 % Final 04/29/2019  9:00 AM [facility]   Platelets 994  964 - 400 K/uL Final 04/29/2019  9:00 AM [facility]   MPV 10. 8High   6.0 - 10.0 fL Final 04/29/2019  9:00 AM [facility]   Neutrophils % 67.7  % Final 04/29/2019  9:00 AM [facility]   Immature Granulocytes % 0.3  0.0 - 5.0 % Final 04/29/2019  9:00 AM [facility]   Lymphocytes % 20.9  % Final 04/29/2019  9:00 AM [facility]   Monocytes % 7.1  % Final 04/29/2019  9:00 AM [facility] Eosinophils % 3.5  % Final 04/29/2019  9:00 AM [facility]   Basophils % 0.5  % Final 04/29/2019  9:00 AM [facility]   Neutrophils # 3.9  2.0 - 7.5 K/uL Final 04/29/2019  9:00 AM [facility]   Immature Granulocytes # 0.0  K/uL Final 04/29/2019  9:00 AM [facility]   Lymphocytes # 1.2Low   1.5 - 4.0 K/uL Final 04/29/2019  9:00 AM [facility]   Monocytes # 0.4  0.2 - 0.8 K/uL Final 04/29/2019  9:00 AM [facility]   Eosinophils # 0.2  0.0 - 0.4 K/uL Final 04/29/2019  9:00 AM [facility]   Basophils # 0.0  0.0 - 0.1 K/uL Final 04/29/2019  9:00 AM              Lab Results   Component Value Date    LABA1C 8.4 (H) 04/29/2019       ASSESSMENT/PLAN:     1. Declining functional status  Treat/try to optimize medical conditions as able. Monitor nutritional and functional status. Encouraged increasing activity level with assistance as tolerated. 2. Chronic kidney disease, unspecified CKD stage  With recent admission to [facility] for worsening renal function, UTI, hyperkalemia, etc (DC summary from that admission reviewed). Completed antibiotics for UTI. Patient to follow up with Dr. Racheal Laws on outpatient basis as scheduled. Nephrotoxic agents as able. Make sure blood pressure is under good control. 3. Type 2 diabetes mellitus with complication, unspecified whether long term insulin use ([address]  With some elevated fingersticks to mid-upper 200s intermittently. Lifestyle modifications discussed. Last hemoglobin A1c 8.4. Monitor fingersticks and adjust regimen accordingly as indicated. 4. Essential hypertension  BP stable/controlled. Continue regimen. 5. Anemia, unspecified type  Monitor hemoglobin. Refused inpatient endoscopic procedure at [facility] admission (DC date 5/3/2019) per medical documentation. On GI regimen. 6. Morbid obesity ([address]  Complicates all aspects of care. Weight loss discussed. Patient was seen and examined by Dr. Tamia Gilman and plan of care reviewed.        JOSE Guzman

## 2019-07-09 VITALS
TEMPERATURE: 98.4 F | DIASTOLIC BLOOD PRESSURE: 72 MMHG | RESPIRATION RATE: 18 BRPM | SYSTOLIC BLOOD PRESSURE: 116 MMHG | HEART RATE: 70 BPM

## 2019-07-15 ENCOUNTER — OFFICE VISIT (OUTPATIENT)
Dept: PRIMARY CARE CLINIC | Age: 59
End: 2019-07-15
Payer: MEDICARE

## 2019-07-15 DIAGNOSIS — N18.9 CHRONIC KIDNEY DISEASE, UNSPECIFIED CKD STAGE: ICD-10-CM

## 2019-07-15 DIAGNOSIS — I10 ESSENTIAL HYPERTENSION: ICD-10-CM

## 2019-07-15 DIAGNOSIS — E66.01 MORBID OBESITY (HCC): ICD-10-CM

## 2019-07-15 DIAGNOSIS — R53.81 DECLINING FUNCTIONAL STATUS: ICD-10-CM

## 2019-07-15 DIAGNOSIS — I48.91 ATRIAL FIBRILLATION, UNSPECIFIED TYPE (HCC): Primary | ICD-10-CM

## 2019-07-15 DIAGNOSIS — E11.8 TYPE 2 DIABETES MELLITUS WITH COMPLICATION (HCC): ICD-10-CM

## 2019-07-15 PROCEDURE — 99308 SBSQ NF CARE LOW MDM 20: CPT | Performed by: NURSE PRACTITIONER

## 2019-07-17 ENCOUNTER — OFFICE VISIT (OUTPATIENT)
Dept: CARDIOLOGY | Facility: CLINIC | Age: 59
End: 2019-07-17

## 2019-07-17 VITALS
SYSTOLIC BLOOD PRESSURE: 146 MMHG | OXYGEN SATURATION: 98 % | DIASTOLIC BLOOD PRESSURE: 88 MMHG | HEART RATE: 63 BPM | RESPIRATION RATE: 13 BRPM

## 2019-07-17 DIAGNOSIS — I27.81 COR PULMONALE, CHRONIC (HCC): ICD-10-CM

## 2019-07-17 DIAGNOSIS — N18.4 ANEMIA DUE TO STAGE 4 CHRONIC KIDNEY DISEASE (HCC): ICD-10-CM

## 2019-07-17 DIAGNOSIS — I10 ESSENTIAL HYPERTENSION: Primary | ICD-10-CM

## 2019-07-17 DIAGNOSIS — E66.01 MORBID OBESITY WITH BMI OF 45.0-49.9, ADULT (HCC): ICD-10-CM

## 2019-07-17 DIAGNOSIS — I87.303 CHRONIC VENOUS HYPERTENSION INVOLVING BOTH SIDES: ICD-10-CM

## 2019-07-17 DIAGNOSIS — E11.65 TYPE 2 DIABETES MELLITUS WITH HYPERGLYCEMIA, WITH LONG-TERM CURRENT USE OF INSULIN (HCC): ICD-10-CM

## 2019-07-17 DIAGNOSIS — R60.0 BILATERAL EDEMA OF LOWER EXTREMITY: ICD-10-CM

## 2019-07-17 DIAGNOSIS — I50.812 CHRONIC RIGHT-SIDED CONGESTIVE HEART FAILURE (HCC): ICD-10-CM

## 2019-07-17 DIAGNOSIS — I89.0 LYMPHEDEMA OF BOTH LOWER EXTREMITIES: ICD-10-CM

## 2019-07-17 DIAGNOSIS — I27.20 PULMONARY HYPERTENSION (HCC): ICD-10-CM

## 2019-07-17 DIAGNOSIS — Z79.4 TYPE 2 DIABETES MELLITUS WITH HYPERGLYCEMIA, WITH LONG-TERM CURRENT USE OF INSULIN (HCC): ICD-10-CM

## 2019-07-17 DIAGNOSIS — D63.1 ANEMIA DUE TO STAGE 4 CHRONIC KIDNEY DISEASE (HCC): ICD-10-CM

## 2019-07-17 DIAGNOSIS — I48.0 PAROXYSMAL ATRIAL FIBRILLATION (HCC): ICD-10-CM

## 2019-07-17 PROBLEM — E11.9 DIABETES MELLITUS: Status: ACTIVE | Noted: 2019-01-10

## 2019-07-17 PROBLEM — I50.32 CHRONIC DIASTOLIC CONGESTIVE HEART FAILURE: Status: ACTIVE | Noted: 2019-07-17

## 2019-07-17 PROCEDURE — 99214 OFFICE O/P EST MOD 30 MIN: CPT | Performed by: INTERNAL MEDICINE

## 2019-07-17 RX ORDER — UREA 10 %
1 LOTION (ML) TOPICAL 2 TIMES DAILY
Refills: 0 | COMMUNITY
Start: 2019-05-29 | End: 2022-04-14 | Stop reason: HOSPADM

## 2019-07-17 RX ORDER — ASPIRIN 81 MG/1
81 TABLET, CHEWABLE ORAL DAILY
Refills: 0 | Status: ON HOLD | COMMUNITY
Start: 2019-05-29 | End: 2022-05-04 | Stop reason: SDUPTHER

## 2019-07-17 RX ORDER — LEVOTHYROXINE SODIUM 175 UG/1
175 TABLET ORAL DAILY
COMMUNITY
Start: 2019-06-07 | End: 2022-04-14 | Stop reason: HOSPADM

## 2019-07-17 RX ORDER — NITROGLYCERIN 0.4 MG/1
0.4 TABLET SUBLINGUAL
Status: ON HOLD | COMMUNITY
End: 2019-08-14

## 2019-07-17 RX ORDER — BISOPROLOL FUMARATE 10 MG/1
5 TABLET, FILM COATED ORAL 2 TIMES DAILY
Status: ON HOLD | COMMUNITY
Start: 2019-07-05 | End: 2019-08-19 | Stop reason: SDUPTHER

## 2019-07-17 RX ORDER — DILTIAZEM HYDROCHLORIDE 120 MG/1
120 TABLET, FILM COATED ORAL DAILY
COMMUNITY
Start: 2019-07-15 | End: 2019-08-19 | Stop reason: HOSPADM

## 2019-07-17 NOTE — PROGRESS NOTES
Subjective:     Encounter Date:07/17/2019      Patient ID: Millicent Mckeon is a 59 y.o. female.    Chief Complaint: Edema  HPI  This is a 59-year-old female patient who presents to cardiology clinic for routine follow-up.  The patient was recently diagnosed with atrial fibrillation.  She was started on rate control measures with long-acting oral Toprol-XL as well as long-acting oral diltiazem.  It was also recommended that the patient began chronic anticoagulation with Eliquis.  It is unclear if this was ever initiated.  The patient has a elevated Chads 2 vascular score of 4.  Her echocardiogram demonstrates evidence of chronic cor pulmonale with severe secondary pulmonary hypertension as well as mild mitral and mild aortic regurgitation.  She has aortic valve sclerosis without stenosis.  She has severe swelling of her lower extremities bilaterally due to a combination of chronic cor pulmonale\chronic right heart failure as well as morbid obesity, obstructive sleep apnea, Pickwickian syndrome and lymphedema.  There is evidence of advanced chronic venous insufficiency.  She is not responded to diuretic therapy.  She has had no shortness of breath at rest or with activity.  There is no orthopnea or PND.  She has no chest discomfort at rest or with activity.  She reports intermittent palpitations but no dizziness or syncope.  The following portions of the patient's history were reviewed and updated as appropriate: allergies, current medications, past family history, past medical history, past social history, past surgical history and problem  Review of Systems   Constitution: Positive for weakness and malaise/fatigue. Negative for chills, diaphoresis, fever, weight gain and weight loss.   HENT: Negative for ear discharge, hearing loss, hoarse voice and nosebleeds.    Eyes: Negative for discharge, double vision, pain and photophobia.   Cardiovascular: Positive for irregular heartbeat, leg swelling and palpitations.  Negative for chest pain, claudication, cyanosis, dyspnea on exertion, near-syncope, orthopnea, paroxysmal nocturnal dyspnea and syncope.   Respiratory: Negative for cough, hemoptysis, shortness of breath, sputum production and wheezing.    Endocrine: Negative for cold intolerance, heat intolerance, polydipsia, polyphagia and polyuria.   Hematologic/Lymphatic: Negative for adenopathy and bleeding problem. Does not bruise/bleed easily.   Skin: Negative for color change, flushing, itching and rash.   Musculoskeletal: Negative for muscle cramps, muscle weakness, myalgias and stiffness.   Gastrointestinal: Negative for abdominal pain, diarrhea, hematemesis, hematochezia, nausea and vomiting.   Genitourinary: Negative for dysuria, frequency and nocturia.   Neurological: Negative for focal weakness, loss of balance, numbness, paresthesias and seizures.   Psychiatric/Behavioral: Negative for altered mental status, hallucinations and suicidal ideas.   Allergic/Immunologic: Negative for HIV exposure, hives and persistent infections.           Current Outpatient Medications:   •  acetaminophen (TYLENOL) 325 MG tablet, Take 650 mg by mouth Every 4 (Four) Hours As Needed for Mild Pain ., Disp: , Rfl:   •  ammonium lactate (AMLACTIN) 12 % cream, Apply 1 application topically to the appropriate area as directed 2 (Two) Times a Day., Disp: , Rfl:   •  arginine 500 MG tablet, Take 500 mg by mouth Daily., Disp: , Rfl:   •  aspirin 81 MG chewable tablet, Chew 81 mg Daily., Disp: , Rfl: 0  •  bisoprolol (ZEBeta) 10 MG tablet, 2 (Two) Times a Day., Disp: , Rfl:   •  diltiaZEM (CARDIZEM) 120 MG tablet, Daily., Disp: , Rfl:   •  docusate sodium (COLACE) 100 MG capsule, Take 100 mg by mouth 2 (Two) Times a Day., Disp: , Rfl:   •  ferrous sulfate 324 (65 Fe) MG tablet delayed-release EC tablet, Take 324 mg by mouth 2 (Two) Times a Day With Meals., Disp: , Rfl:   •  insulin aspart (novoLOG) 100 UNIT/ML injection, Inject  under the skin into  the appropriate area as directed 3 (Three) Times a Day Before Meals. Sliding scale, low dose, Disp: , Rfl:   •  isosorbide mononitrate (ISMO,MONOKET) 10 MG tablet, Take 15 mg by mouth Daily., Disp: , Rfl:   •  levothyroxine (SYNTHROID, LEVOTHROID) 150 MCG tablet, Take 150 mcg by mouth Daily., Disp: , Rfl:   •  lidocaine (LIDODERM) 5 %, Place 1 patch on the skin as directed by provider Daily. Apply patch to left knee daily, on at 0700 am off at 2000, Disp: , Rfl:   •  Multiple Vitamins-Minerals (MULTIVITAMIN WITH MINERALS) tablet tablet, Take 1 tablet by mouth Daily., Disp: , Rfl:   •  nitroglycerin (NITROSTAT) 0.4 MG SL tablet, Place 0.4 mg under the tongue Every 5 (Five) Minutes As Needed for Chest Pain. Take no more than 3 doses in 15 minutes., Disp: , Rfl:   •  Nutritional Supplements (PROTEIN SUPPLEMENT 80% PO), Take 30 mL by mouth 2 (Two) Times a Day., Disp: , Rfl:   •  pantoprazole (PROTONIX) 40 MG EC tablet, Take 40 mg by mouth Daily., Disp: , Rfl:   •  polyethylene glycol (MIRALAX) packet, Take 17 g by mouth 2 (Two) Times a Day., Disp: , Rfl:   •  RA VITAMIN C 500 MG tablet, Take 500 mg by mouth Daily., Disp: , Rfl: 0  •  saccharomyces boulardii (FLORASTOR) 250 MG capsule, Take 250 mg by mouth 2 (Two) Times a Day., Disp: , Rfl:   •  simvastatin (ZOCOR) 20 MG tablet, Take 20 mg by mouth Every Night., Disp: , Rfl: 0  •  Zinc Sulfate 220 (50 Zn) MG tablet, Take 1 tablet by mouth 2 (Two) Times a Day., Disp: , Rfl: 0    Objective:   Physical Exam   Constitutional: She is oriented to person, place, and time. She appears well-developed and well-nourished. No distress.   HENT:   Head: Normocephalic and atraumatic.   Mouth/Throat: Oropharynx is clear and moist.   Eyes: Conjunctivae and EOM are normal. Pupils are equal, round, and reactive to light. No scleral icterus.   Neck: Normal range of motion. Neck supple. No JVD present. No tracheal deviation present. No thyromegaly present.   Cardiovascular: Normal rate, S1  normal, S2 normal, normal heart sounds and intact distal pulses. An irregularly irregular rhythm present. PMI is not displaced. Exam reveals no gallop and no friction rub.   No murmur heard.  Pulses:       Carotid pulses are 2+ on the right side, and 2+ on the left side.       Radial pulses are 1+ on the right side, and 2+ on the left side.   Pulmonary/Chest: Effort normal and breath sounds normal. No stridor. No respiratory distress. She has no wheezes. She has no rales.   Abdominal: Soft. Bowel sounds are normal. She exhibits no distension and no mass. There is no tenderness. There is no rebound and no guarding.   Musculoskeletal: Normal range of motion. She exhibits edema. She exhibits no deformity.   Neurological: She is alert and oriented to person, place, and time. She displays normal reflexes. No cranial nerve deficit. Coordination normal.   Skin: Skin is warm and dry. No rash noted. She is not diaphoretic. No erythema.   Psychiatric: She has a normal mood and affect. Her behavior is normal. Thought content normal.     Blood pressure 146/88, pulse 63, resp. rate 13, SpO2 98 %.   Lab Review:     Assessment:       1. Essential hypertension  Less than ideal blood pressure control.    2. Type 2 diabetes mellitus with hyperglycemia, with long-term current use of insulin (CMS/[facility])  This is followed closely by her primary care provider.    3. Anemia due to stage 4 chronic kidney disease (CMS/HCC)      4. Bilateral edema of lower extremity  The patient demonstrates severe bilateral lower extremity edema with elephantitis.    5. Chronic right-sided congestive heart failure (CMS/HCC)  Her echocardiogram is consistent with chronic cor pulmonale with severe secondary pulmonary hypertension.    6.  Persistent atrial fibrillation (CMS/HCC)  She is currently rate controlled but not on anticoagulation.    7. Pulmonary hypertension (CMS/HCC)  This is multifactorial due to morbid obesity, obstructive sleep apnea, Pickwickian  syndrome and hypertensive cardiac disease.    8. Cor pulmonale, chronic (CMS/HCC)      9. Chronic venous hypertension involving both sides  The patient demonstrates evidence of severe chronic venous insufficiency in both lower extremities which has been refractory to diuretic therapy.    10. Lymphedema of both lower extremities  The patient demonstrates severe bilateral lower extremity lymphedema with evidence of elephantitis.    11. Morbid obesity with BMI of 45.0-49.9, adult (CMS/HCC)  This is due to excess calorie intake.  There is evidence of multiple comorbidities.    Procedures    Plan:     I have recommended Eliquis 5 mg orally twice per day if there are no contraindications.  After a period of 4-6 weeks of anticoagulation therapy the patient can be considered for initiation of antiarrhythmic drug therapy.  I have recommended initiation of lower extremity venous pneumatic compression utilizing the [employer] system.  Leg elevation is daily.  The patient should be on a strict sodium restriction of no more than 1500 mg of sodium per day.  Diuretic therapy should be used cautiously as this will not lead to improvement of her peripheral edema but could very well lead to worsening renal failure due to the severity of her right-sided heart failure and secondary pulmonary hypertension.  In addition to volume depletion electrolyte abnormalities remain a concern with escalating diuretic therapy.

## 2019-07-19 ENCOUNTER — TELEPHONE (OUTPATIENT)
Dept: CARDIOLOGY | Facility: CLINIC | Age: 59
End: 2019-07-19

## 2019-07-19 NOTE — TELEPHONE ENCOUNTER
[employer] called stating that the note needs to state that patient has already tried some type of compression (Stockings or wrapping) in order to get the compression pump.

## 2019-07-22 ENCOUNTER — HOSPITAL ENCOUNTER (OUTPATIENT)
Facility: HOSPITAL | Age: 59
Discharge: HOME | End: 2019-07-22
Payer: MEDICARE

## 2019-07-22 LAB
ALBUMIN SERPL-MCNC: 4 G/DL (ref 3.4–4.8)
ANION GAP SERPL CALCULATED.3IONS-SCNC: 13 MMOL/L (ref 3–16)
BASOPHILS ABSOLUTE: 0 K/UL (ref 0–0.1)
BASOPHILS RELATIVE PERCENT: 0.6 %
BUN BLDV-MCNC: 56 MG/DL (ref 6–20)
CALCIUM SERPL-MCNC: 9.6 MG/DL (ref 8.5–10.5)
CHLORIDE BLD-SCNC: 109 MMOL/L (ref 98–107)
CO2: 16 MMOL/L (ref 20–30)
CREAT SERPL-MCNC: 2.1 MG/DL (ref 0.4–1.2)
EOSINOPHILS ABSOLUTE: 0.1 K/UL (ref 0–0.4)
EOSINOPHILS RELATIVE PERCENT: 2.8 %
GFR AFRICAN AMERICAN: 29
GFR NON-AFRICAN AMERICAN: 24
GLUCOSE BLD-MCNC: 162 MG/DL (ref 74–106)
HCT VFR BLD CALC: 36.5 % (ref 37–47)
HEMOGLOBIN: 11.1 G/DL (ref 11.5–16.5)
IMMATURE GRANULOCYTES #: 0 K/UL
IMMATURE GRANULOCYTES %: 0.2 % (ref 0–5)
IRON: 56 UG/DL (ref 37–145)
LYMPHOCYTES ABSOLUTE: 1.1 K/UL (ref 1.5–4)
LYMPHOCYTES RELATIVE PERCENT: 22.9 %
MCH RBC QN AUTO: 25.5 PG (ref 27–32)
MCHC RBC AUTO-ENTMCNC: 30.4 G/DL (ref 31–35)
MCV RBC AUTO: 83.7 FL (ref 80–100)
MONOCYTES ABSOLUTE: 0.4 K/UL (ref 0.2–0.8)
MONOCYTES RELATIVE PERCENT: 7.6 %
NEUTROPHILS ABSOLUTE: 3.1 K/UL (ref 2–7.5)
NEUTROPHILS RELATIVE PERCENT: 65.9 %
PDW BLD-RTO: 21.3 % (ref 11–16)
PHOSPHORUS: 4.2 MG/DL (ref 2.5–4.5)
PLATELET # BLD: 121 K/UL (ref 150–400)
POTASSIUM SERPL-SCNC: 6.6 MMOL/L (ref 3.4–5.1)
POTASSIUM SERPL-SCNC: 6.9 MMOL/L (ref 3.4–5.1)
RBC # BLD: 4.36 M/UL (ref 3.8–5.8)
SODIUM BLD-SCNC: 138 MMOL/L (ref 136–145)
TOTAL IRON BINDING CAPACITY: 331 UG/DL (ref 250–450)
URIC ACID, SERUM: 6.4 MG/DL (ref 2.5–7.1)
VITAMIN D 25-HYDROXY: 25.9 (ref 32–100)
WBC # BLD: 4.7 K/UL (ref 4–11)

## 2019-07-22 PROCEDURE — 85025 COMPLETE CBC W/AUTO DIFF WBC: CPT

## 2019-07-22 PROCEDURE — 83540 ASSAY OF IRON: CPT

## 2019-07-22 PROCEDURE — 84550 ASSAY OF BLOOD/URIC ACID: CPT

## 2019-07-22 PROCEDURE — 82306 VITAMIN D 25 HYDROXY: CPT

## 2019-07-22 PROCEDURE — 80069 RENAL FUNCTION PANEL: CPT

## 2019-07-22 PROCEDURE — 83550 IRON BINDING TEST: CPT

## 2019-07-22 PROCEDURE — 83970 ASSAY OF PARATHORMONE: CPT

## 2019-07-22 PROCEDURE — 84132 ASSAY OF SERUM POTASSIUM: CPT

## 2019-07-22 NOTE — TELEPHONE ENCOUNTER
Has she tried any lower extremity compression stockings in the past?  I do not see any documentation as such.  If we could find out if she has or is the use of diuretics acceptable documentation for the use of the bio tab compression pump?  Thanks.  Please let me know or if there is another option to okay the use of the compression pump please let me know thanks.

## 2019-07-23 LAB — PARATHYROID HORMONE INTACT: 64.2 PG/ML (ref 14–72)

## 2019-07-23 NOTE — TELEPHONE ENCOUNTER
Called to ask nursing home if patient has ever tried compression stockings or any diuretics, Dali from the long term care nursing home states she has not but also states she cannot get the pump due to patient being in long term care. Verbal order over the phone was given for the JOBST stockings.

## 2019-07-25 ENCOUNTER — HOSPITAL ENCOUNTER (OUTPATIENT)
Facility: HOSPITAL | Age: 59
Discharge: HOME | End: 2019-07-25
Payer: MEDICARE

## 2019-07-25 LAB
ALBUMIN SERPL-MCNC: 4 G/DL (ref 3.4–4.8)
ANION GAP SERPL CALCULATED.3IONS-SCNC: 14 MMOL/L (ref 3–16)
BUN BLDV-MCNC: 48 MG/DL (ref 6–20)
CALCIUM SERPL-MCNC: 9.7 MG/DL (ref 8.5–10.5)
CHLORIDE BLD-SCNC: 108 MMOL/L (ref 98–107)
CO2: 21 MMOL/L (ref 20–30)
CREAT SERPL-MCNC: 2 MG/DL (ref 0.4–1.2)
GFR AFRICAN AMERICAN: 31
GFR NON-AFRICAN AMERICAN: 25
GLUCOSE BLD-MCNC: 140 MG/DL (ref 74–106)
PHOSPHORUS: 4.4 MG/DL (ref 2.5–4.5)
POTASSIUM SERPL-SCNC: 4.6 MMOL/L (ref 3.4–5.1)
SODIUM BLD-SCNC: 143 MMOL/L (ref 136–145)

## 2019-07-25 PROCEDURE — 80069 RENAL FUNCTION PANEL: CPT

## 2019-07-29 ENCOUNTER — HOSPITAL ENCOUNTER (OUTPATIENT)
Facility: HOSPITAL | Age: 59
Discharge: HOME | End: 2019-07-29
Payer: MEDICARE

## 2019-07-29 LAB
ALBUMIN SERPL-MCNC: 4.1 G/DL (ref 3.4–4.8)
ANION GAP SERPL CALCULATED.3IONS-SCNC: 14 MMOL/L (ref 3–16)
BUN BLDV-MCNC: 42 MG/DL (ref 6–20)
CALCIUM SERPL-MCNC: 9.3 MG/DL (ref 8.5–10.5)
CHLORIDE BLD-SCNC: 109 MMOL/L (ref 98–107)
CO2: 20 MMOL/L (ref 20–30)
CREAT SERPL-MCNC: 2 MG/DL (ref 0.4–1.2)
GFR AFRICAN AMERICAN: 31
GFR NON-AFRICAN AMERICAN: 25
GLUCOSE BLD-MCNC: 148 MG/DL (ref 74–106)
PHOSPHORUS: 3.7 MG/DL (ref 2.5–4.5)
POTASSIUM SERPL-SCNC: 4.9 MMOL/L (ref 3.4–5.1)
SODIUM BLD-SCNC: 143 MMOL/L (ref 136–145)

## 2019-07-29 PROCEDURE — 80069 RENAL FUNCTION PANEL: CPT

## 2019-08-04 ENCOUNTER — HOSPITAL ENCOUNTER (OUTPATIENT)
Facility: HOSPITAL | Age: 59
Discharge: HOME | End: 2019-08-04
Payer: MEDICARE

## 2019-08-04 LAB
ALBUMIN SERPL-MCNC: 3.7 G/DL (ref 3.4–4.8)
ANION GAP SERPL CALCULATED.3IONS-SCNC: 14 MMOL/L (ref 3–16)
BUN BLDV-MCNC: 36 MG/DL (ref 6–20)
CALCIUM SERPL-MCNC: 9.3 MG/DL (ref 8.5–10.5)
CHLORIDE BLD-SCNC: 106 MMOL/L (ref 98–107)
CO2: 19 MMOL/L (ref 20–30)
CREAT SERPL-MCNC: 1.7 MG/DL (ref 0.4–1.2)
GFR AFRICAN AMERICAN: 37
GFR NON-AFRICAN AMERICAN: 31
GLUCOSE BLD-MCNC: 182 MG/DL (ref 74–106)
PHOSPHORUS: 3.4 MG/DL (ref 2.5–4.5)
POTASSIUM SERPL-SCNC: 4.7 MMOL/L (ref 3.4–5.1)
SODIUM BLD-SCNC: 139 MMOL/L (ref 136–145)

## 2019-08-04 PROCEDURE — 80069 RENAL FUNCTION PANEL: CPT

## 2019-08-13 ENCOUNTER — APPOINTMENT (OUTPATIENT)
Dept: GENERAL RADIOLOGY | Facility: HOSPITAL | Age: 59
DRG: 202 | End: 2019-08-13
Payer: MEDICARE

## 2019-08-13 ENCOUNTER — HOSPITAL ENCOUNTER (INPATIENT)
Facility: HOSPITAL | Age: 59
LOS: 1 days | Discharge: ANOTHER HOSPITAL | DRG: 202 | End: 2019-08-14
Attending: EMERGENCY MEDICINE | Admitting: INTERNAL MEDICINE
Payer: MEDICARE

## 2019-08-13 DIAGNOSIS — J44.1 COPD EXACERBATION (HCC): Primary | ICD-10-CM

## 2019-08-13 DIAGNOSIS — N17.9 ACUTE KIDNEY INJURY (HCC): ICD-10-CM

## 2019-08-13 LAB
A/G RATIO: 1.3 (ref 0.8–2)
ALBUMIN SERPL-MCNC: 3.9 G/DL (ref 3.4–4.8)
ALP BLD-CCNC: 173 U/L (ref 25–100)
ALT SERPL-CCNC: 13 U/L (ref 4–36)
ANION GAP SERPL CALCULATED.3IONS-SCNC: 13 MMOL/L (ref 3–16)
AST SERPL-CCNC: 20 U/L (ref 8–33)
BACTERIA: ABNORMAL /HPF
BASOPHILS ABSOLUTE: 0 K/UL (ref 0–0.1)
BASOPHILS RELATIVE PERCENT: 0.5 %
BILIRUB SERPL-MCNC: 0.5 MG/DL (ref 0.3–1.2)
BILIRUBIN URINE: NEGATIVE
BLOOD, URINE: ABNORMAL
BUN BLDV-MCNC: 29 MG/DL (ref 6–20)
CALCIUM SERPL-MCNC: 9.6 MG/DL (ref 8.5–10.5)
CHLORIDE BLD-SCNC: 103 MMOL/L (ref 98–107)
CLARITY: ABNORMAL
CO2: 23 MMOL/L (ref 20–30)
COLOR: YELLOW
CREAT SERPL-MCNC: 1.7 MG/DL (ref 0.4–1.2)
EOSINOPHILS ABSOLUTE: 0.1 K/UL (ref 0–0.4)
EOSINOPHILS RELATIVE PERCENT: 2.6 %
EPITHELIAL CELLS, UA: ABNORMAL /HPF
GFR AFRICAN AMERICAN: 37
GFR NON-AFRICAN AMERICAN: 31
GLOBULIN: 3.1 G/DL
GLUCOSE BLD-MCNC: 112 MG/DL (ref 74–106)
GLUCOSE BLD-MCNC: 117 MG/DL (ref 74–106)
GLUCOSE BLD-MCNC: 120 MG/DL (ref 74–106)
GLUCOSE URINE: NEGATIVE MG/DL
HCT VFR BLD CALC: 36.1 % (ref 37–47)
HEMOGLOBIN: 10.8 G/DL (ref 11.5–16.5)
IMMATURE GRANULOCYTES #: 0 K/UL
IMMATURE GRANULOCYTES %: 0.5 % (ref 0–5)
KETONES, URINE: 15 MG/DL
LACTIC ACID: 1.8 MMOL/L (ref 0.4–2)
LEUKOCYTE ESTERASE, URINE: NEGATIVE
LIPASE: 23 U/L (ref 5.6–51.3)
LYMPHOCYTES ABSOLUTE: 0.9 K/UL (ref 1.5–4)
LYMPHOCYTES RELATIVE PERCENT: 21.7 %
MCH RBC QN AUTO: 25.2 PG (ref 27–32)
MCHC RBC AUTO-ENTMCNC: 29.9 G/DL (ref 31–35)
MCV RBC AUTO: 84.3 FL (ref 80–100)
MICROSCOPIC EXAMINATION: YES
MONOCYTES ABSOLUTE: 0.6 K/UL (ref 0.2–0.8)
MONOCYTES RELATIVE PERCENT: 14.6 %
MUCUS: ABNORMAL /LPF
NEUTROPHILS ABSOLUTE: 2.4 K/UL (ref 2–7.5)
NEUTROPHILS RELATIVE PERCENT: 60.1 %
NITRITE, URINE: NEGATIVE
PDW BLD-RTO: 21.2 % (ref 11–16)
PERFORMED ON: ABNORMAL
PERFORMED ON: ABNORMAL
PH UA: 5.5 (ref 5–8)
PLATELET # BLD: 131 K/UL (ref 150–400)
PMV BLD AUTO: 10.1 FL (ref 6–10)
POTASSIUM REFLEX MAGNESIUM: 4.5 MMOL/L (ref 3.4–5.1)
PROTEIN UA: >=300 MG/DL
RBC # BLD: 4.28 M/UL (ref 3.8–5.8)
RBC UA: ABNORMAL /HPF (ref 0–2)
SODIUM BLD-SCNC: 139 MMOL/L (ref 136–145)
SPECIFIC GRAVITY UA: 1.02 (ref 1–1.03)
TOTAL PROTEIN: 7 G/DL (ref 6.4–8.3)
TROPONIN: <0.3 NG/ML
URINE REFLEX TO CULTURE: ABNORMAL
URINE TYPE: ABNORMAL
UROBILINOGEN, URINE: 0.2 E.U./DL
WBC # BLD: 3.9 K/UL (ref 4–11)
WBC UA: ABNORMAL /HPF (ref 0–5)
YEAST: PRESENT /HPF

## 2019-08-13 PROCEDURE — 93005 ELECTROCARDIOGRAM TRACING: CPT

## 2019-08-13 PROCEDURE — 81001 URINALYSIS AUTO W/SCOPE: CPT

## 2019-08-13 PROCEDURE — 85025 COMPLETE CBC W/AUTO DIFF WBC: CPT

## 2019-08-13 PROCEDURE — 94640 AIRWAY INHALATION TREATMENT: CPT

## 2019-08-13 PROCEDURE — 84484 ASSAY OF TROPONIN QUANT: CPT

## 2019-08-13 PROCEDURE — 96374 THER/PROPH/DIAG INJ IV PUSH: CPT

## 2019-08-13 PROCEDURE — 6370000000 HC RX 637 (ALT 250 FOR IP): Performed by: EMERGENCY MEDICINE

## 2019-08-13 PROCEDURE — 1200000000 HC SEMI PRIVATE

## 2019-08-13 PROCEDURE — 83605 ASSAY OF LACTIC ACID: CPT

## 2019-08-13 PROCEDURE — 2580000003 HC RX 258: Performed by: EMERGENCY MEDICINE

## 2019-08-13 PROCEDURE — 6360000002 HC RX W HCPCS: Performed by: EMERGENCY MEDICINE

## 2019-08-13 PROCEDURE — 99285 EMERGENCY DEPT VISIT HI MDM: CPT

## 2019-08-13 PROCEDURE — 36415 COLL VENOUS BLD VENIPUNCTURE: CPT

## 2019-08-13 PROCEDURE — 6360000002 HC RX W HCPCS: Performed by: INTERNAL MEDICINE

## 2019-08-13 PROCEDURE — 83690 ASSAY OF LIPASE: CPT

## 2019-08-13 PROCEDURE — 71045 X-RAY EXAM CHEST 1 VIEW: CPT

## 2019-08-13 PROCEDURE — 6370000000 HC RX 637 (ALT 250 FOR IP): Performed by: INTERNAL MEDICINE

## 2019-08-13 PROCEDURE — 80053 COMPREHEN METABOLIC PANEL: CPT

## 2019-08-13 RX ORDER — DEXTROSE MONOHYDRATE 50 MG/ML
100 INJECTION, SOLUTION INTRAVENOUS PRN
Status: DISCONTINUED | OUTPATIENT
Start: 2019-08-13 | End: 2019-08-14 | Stop reason: HOSPADM

## 2019-08-13 RX ORDER — IPRATROPIUM BROMIDE AND ALBUTEROL SULFATE 2.5; .5 MG/3ML; MG/3ML
1 SOLUTION RESPIRATORY (INHALATION)
Status: DISCONTINUED | OUTPATIENT
Start: 2019-08-13 | End: 2019-08-14

## 2019-08-13 RX ORDER — METHYLPREDNISOLONE SODIUM SUCCINATE 40 MG/ML
40 INJECTION, POWDER, LYOPHILIZED, FOR SOLUTION INTRAMUSCULAR; INTRAVENOUS ONCE
Status: COMPLETED | OUTPATIENT
Start: 2019-08-13 | End: 2019-08-13

## 2019-08-13 RX ORDER — PANTOPRAZOLE SODIUM 40 MG/1
40 TABLET, DELAYED RELEASE ORAL ONCE
Status: COMPLETED | OUTPATIENT
Start: 2019-08-13 | End: 2019-08-13

## 2019-08-13 RX ORDER — METHYLPREDNISOLONE SODIUM SUCCINATE 40 MG/ML
INJECTION, POWDER, LYOPHILIZED, FOR SOLUTION INTRAMUSCULAR; INTRAVENOUS
Status: DISPENSED
Start: 2019-08-13 | End: 2019-08-14

## 2019-08-13 RX ORDER — IPRATROPIUM BROMIDE AND ALBUTEROL SULFATE 2.5; .5 MG/3ML; MG/3ML
1 SOLUTION RESPIRATORY (INHALATION) ONCE
Status: COMPLETED | OUTPATIENT
Start: 2019-08-13 | End: 2019-08-13

## 2019-08-13 RX ORDER — 0.9 % SODIUM CHLORIDE 0.9 %
1000 INTRAVENOUS SOLUTION INTRAVENOUS ONCE
Status: DISCONTINUED | OUTPATIENT
Start: 2019-08-13 | End: 2019-08-14

## 2019-08-13 RX ORDER — DEXTROSE MONOHYDRATE 25 G/50ML
12.5 INJECTION, SOLUTION INTRAVENOUS PRN
Status: DISCONTINUED | OUTPATIENT
Start: 2019-08-13 | End: 2019-08-14 | Stop reason: HOSPADM

## 2019-08-13 RX ORDER — NICOTINE POLACRILEX 4 MG
15 LOZENGE BUCCAL PRN
Status: DISCONTINUED | OUTPATIENT
Start: 2019-08-13 | End: 2019-08-14 | Stop reason: HOSPADM

## 2019-08-13 RX ORDER — SODIUM BICARBONATE 650 MG/1
650 TABLET ORAL 3 TIMES DAILY
COMMUNITY
End: 2020-01-29 | Stop reason: ALTCHOICE

## 2019-08-13 RX ORDER — ONDANSETRON 2 MG/ML
4 INJECTION INTRAMUSCULAR; INTRAVENOUS EVERY 30 MIN PRN
Status: DISCONTINUED | OUTPATIENT
Start: 2019-08-13 | End: 2019-08-13

## 2019-08-13 RX ADMIN — SODIUM CHLORIDE 1000 ML: 9 INJECTION, SOLUTION INTRAVENOUS at 17:50

## 2019-08-13 RX ADMIN — CEFTRIAXONE 1 G: 1 INJECTION, POWDER, FOR SOLUTION INTRAMUSCULAR; INTRAVENOUS at 19:36

## 2019-08-13 RX ADMIN — PANTOPRAZOLE SODIUM 40 MG: 40 TABLET, DELAYED RELEASE ORAL at 23:41

## 2019-08-13 RX ADMIN — METHYLPREDNISOLONE SODIUM SUCCINATE 40 MG: 40 INJECTION, POWDER, FOR SOLUTION INTRAMUSCULAR; INTRAVENOUS at 23:41

## 2019-08-13 RX ADMIN — IPRATROPIUM BROMIDE AND ALBUTEROL SULFATE 1 AMPULE: .5; 3 SOLUTION RESPIRATORY (INHALATION) at 18:50

## 2019-08-13 ASSESSMENT — ENCOUNTER SYMPTOMS
BACK PAIN: 1
VOMITING: 1
HOARSE VOICE: 0
SPUTUM PRODUCTION: 0
SHORTNESS OF BREATH: 1
COUGH: 1
ABDOMINAL PAIN: 0
HEMATEMESIS: 0
NAUSEA: 1
WHEEZING: 1
STRIDOR: 0
DIARRHEA: 0
EYE DISCHARGE: 0

## 2019-08-13 ASSESSMENT — PAIN SCALES - GENERAL: PAINLEVEL_OUTOF10: 2

## 2019-08-13 ASSESSMENT — PAIN DESCRIPTION - FREQUENCY: FREQUENCY: CONTINUOUS

## 2019-08-13 ASSESSMENT — PAIN DESCRIPTION - DESCRIPTORS: DESCRIPTORS: SORE

## 2019-08-13 ASSESSMENT — PAIN DESCRIPTION - PAIN TYPE: TYPE: ACUTE PAIN

## 2019-08-13 ASSESSMENT — PAIN DESCRIPTION - LOCATION: LOCATION: ABDOMEN

## 2019-08-13 NOTE — ED PROVIDER NOTES
Neurological: She is alert and oriented to person, place, and time. Skin: Skin is warm and dry. Psychiatric: She has a normal mood and affect. Nursing note and vitals reviewed. DIAGNOSTIC RESULTS     EKG: All EKG's are interpreted by the Emergency Department Physician who either signs or Co-signs this chart in the absenceof a cardiologist.    The EKG interpreted by me shows:  Sinus tach at rate of 118;nomal axis and intervals; normal ST segments andT wave inversion in 1, L and V4-6; flat T waves in 2,3, and F.  Prolonged QT.     RADIOLOGY:   Non-plain film images such as CT, Ultrasound and MRI are read by the radiologist. Plain radiographic images are visualized and preliminarily interpreted by the emergency physician with the below findings:      ? Radiologist's Report Reviewed:  XR CHEST PORTABLE   Final Result      Cardiomegaly with mild vascular congestion showing improvement since prior exam                LABS:    I have reviewed and interpreted all of the currently available lab results from this visit (ifapplicable):  Results for orders placed or performed during the hospital encounter of 08/13/19   CBC Auto Differential   Result Value Ref Range    WBC 3.9 (L) 4.0 - 11.0 K/uL    RBC 4.28 3.80 - 5.80 M/uL    Hemoglobin 10.8 (L) 11.5 - 16.5 g/dL    Hematocrit 36.1 (L) 37.0 - 47.0 %    MCV 84.3 80.0 - 100.0 fL    MCH 25.2 (L) 27.0 - 32.0 pg    MCHC 29.9 (L) 31.0 - 35.0 g/dL    RDW 21.2 (H) 11.0 - 16.0 %    Platelets 734 (L) 539 - 400 K/uL    MPV 10.1 (H) 6.0 - 10.0 fL    Neutrophils % 60.1 %    Immature Granulocytes % 0.5 0.0 - 5.0 %    Lymphocytes % 21.7 %    Monocytes % 14.6 %    Eosinophils % 2.6 %    Basophils % 0.5 %    Neutrophils # 2.4 2.0 - 7.5 K/uL    Immature Granulocytes # 0.0 K/uL    Lymphocytes # 0.9 (L) 1.5 - 4.0 K/uL    Monocytes # 0.6 0.2 - 0.8 K/uL    Eosinophils # 0.1 0.0 - 0.4 K/uL    Basophils # 0.0 0.0 - 0.1 K/uL   Comprehensive Metabolic Panel w/ Reflex to MG   Result Value DIFFERENTIAL DIAGNOSIS/MDM:   Vitals:    Vitals:    08/13/19 1915 08/13/19 1930 08/13/19 1940 08/13/19 1945   BP: (!) 157/96 (!) 160/105 (!) 157/90 (!) 157/90   Pulse: 114 118 108 115   Resp:       Temp:       TempSrc:       SpO2: 93% 97% 94% 95%   Weight:       Height:       Ill for 3 days with cough and shortness of breathe. No history, she states, or pulmonary or cardiac problems. Will evaluate for CHF, COPD exacerbation and pneumonia. MEDICATIONS ADMINISTERED IN ED:  Medications   0.9 % sodium chloride bolus (1,000 mLs Intravenous New Bag 8/13/19 1750)   ondansetron (ZOFRAN) injection 4 mg (has no administration in time range)   ipratropium-albuterol (DUONEB) nebulizer solution 1 ampule (1 ampule Inhalation Given 8/13/19 1850)   cefTRIAXone (ROCEPHIN) 1 g IVPB in 50 mL D5W minibag (1 g Intravenous New Bag 8/13/19 1936)     19:40 I have re evaluated patient and she states that her breathing is improved post duoneb. Discussed findings and treatment plan for admit and she seems to understand, agree and is lucid. CONSULTS:  None19:20 Discussed with Dr. Noemí Zaidi who will admit and gives orders. CRITICAL CARE TIME    Total Critical Care time was 30 minutes, excluding separately reportable procedures. There was a high probability of clinically significant/life threatening deterioration in the patient's condition which required my urgent intervention. FINAL IMPRESSION      1. COPD exacerbation (HCC) New Problem   2.  Acute kidney injury [facility])          DISPOSITION/PLAN   DISPOSITION Admitted 08/13/2019 08:09:15 PM      PATIENT REFERRED TO:  JOSE Barry  [address]  [phone]            DISCHARGE MEDICATIONS:  New Prescriptions    No medications on file       Comment: Please note this report has been produced using speech recognition software and may contain errorsrelated to that system including errors in grammar, punctuation, and spelling, as well as words

## 2019-08-14 ENCOUNTER — HOSPITAL ENCOUNTER (INPATIENT)
Facility: HOSPITAL | Age: 59
LOS: 5 days | Discharge: NURSING, REHAB OR LONG-TERM CARE | End: 2019-08-19
Attending: INTERNAL MEDICINE | Admitting: INTERNAL MEDICINE

## 2019-08-14 ENCOUNTER — OUTSIDE FACILITY SERVICE (OUTPATIENT)
Dept: CARDIOLOGY | Facility: CLINIC | Age: 59
End: 2019-08-14

## 2019-08-14 VITALS
DIASTOLIC BLOOD PRESSURE: 80 MMHG | SYSTOLIC BLOOD PRESSURE: 140 MMHG | RESPIRATION RATE: 20 BRPM | WEIGHT: 293 LBS | HEART RATE: 120 BPM | HEIGHT: 66 IN | OXYGEN SATURATION: 93 % | BODY MASS INDEX: 47.09 KG/M2 | TEMPERATURE: 98 F

## 2019-08-14 PROBLEM — J20.8 ACUTE BRONCHITIS DUE TO OTHER SPECIFIED ORGANISMS: Status: ACTIVE | Noted: 2019-08-14

## 2019-08-14 PROBLEM — J20.9 ACUTE BRONCHITIS: Status: ACTIVE | Noted: 2019-08-14

## 2019-08-14 PROBLEM — N18.30 CKD (CHRONIC KIDNEY DISEASE) STAGE 3, GFR 30-59 ML/MIN (HCC): Status: ACTIVE | Noted: 2019-08-14

## 2019-08-14 PROBLEM — I48.91 A-FIB: Status: ACTIVE | Noted: 2019-08-14

## 2019-08-14 LAB
GLUCOSE BLD-MCNC: 167 MG/DL (ref 74–106)
GLUCOSE BLD-MCNC: 214 MG/DL (ref 74–106)
GLUCOSE BLDC GLUCOMTR-MCNC: 420 MG/DL (ref 70–130)
PERFORMED ON: ABNORMAL
PERFORMED ON: ABNORMAL
PROCALCITONIN SERPL-MCNC: 0.16 NG/ML (ref 0.1–0.25)
TROPONIN T SERPL-MCNC: <0.01 NG/ML (ref 0–0.03)
TROPONIN T SERPL-MCNC: <0.01 NG/ML (ref 0–0.03)

## 2019-08-14 PROCEDURE — 94761 N-INVAS EAR/PLS OXIMETRY MLT: CPT

## 2019-08-14 PROCEDURE — 6360000002 HC RX W HCPCS: Performed by: INTERNAL MEDICINE

## 2019-08-14 PROCEDURE — 2580000003 HC RX 258: Performed by: INTERNAL MEDICINE

## 2019-08-14 PROCEDURE — 99235 HOSP IP/OBS SAME DATE MOD 70: CPT | Performed by: INTERNAL MEDICINE

## 2019-08-14 PROCEDURE — 84145 PROCALCITONIN (PCT): CPT | Performed by: INTERNAL MEDICINE

## 2019-08-14 PROCEDURE — 63710000001 INSULIN ASPART PER 5 UNITS: Performed by: FAMILY MEDICINE

## 2019-08-14 PROCEDURE — 94640 AIRWAY INHALATION TREATMENT: CPT

## 2019-08-14 PROCEDURE — 87081 CULTURE SCREEN ONLY: CPT | Performed by: INTERNAL MEDICINE

## 2019-08-14 PROCEDURE — 99222 1ST HOSP IP/OBS MODERATE 55: CPT | Performed by: INTERNAL MEDICINE

## 2019-08-14 PROCEDURE — 82962 GLUCOSE BLOOD TEST: CPT

## 2019-08-14 PROCEDURE — 94799 UNLISTED PULMONARY SVC/PX: CPT

## 2019-08-14 PROCEDURE — 99223 1ST HOSP IP/OBS HIGH 75: CPT | Performed by: INTERNAL MEDICINE

## 2019-08-14 PROCEDURE — 84484 ASSAY OF TROPONIN QUANT: CPT | Performed by: INTERNAL MEDICINE

## 2019-08-14 PROCEDURE — 6370000000 HC RX 637 (ALT 250 FOR IP)

## 2019-08-14 PROCEDURE — 93005 ELECTROCARDIOGRAM TRACING: CPT | Performed by: INTERNAL MEDICINE

## 2019-08-14 PROCEDURE — 6370000000 HC RX 637 (ALT 250 FOR IP): Performed by: INTERNAL MEDICINE

## 2019-08-14 PROCEDURE — 6360000002 HC RX W HCPCS: Performed by: PHYSICIAN ASSISTANT

## 2019-08-14 PROCEDURE — 25010000002 FUROSEMIDE PER 20 MG: Performed by: INTERNAL MEDICINE

## 2019-08-14 PROCEDURE — 6370000000 HC RX 637 (ALT 250 FOR IP): Performed by: PHYSICIAN ASSISTANT

## 2019-08-14 RX ORDER — BISOPROLOL FUMARATE 5 MG/1
20 TABLET, FILM COATED ORAL
Status: DISCONTINUED | OUTPATIENT
Start: 2019-08-15 | End: 2019-08-15

## 2019-08-14 RX ORDER — DOCUSATE SODIUM 100 MG/1
100 CAPSULE, LIQUID FILLED ORAL 2 TIMES DAILY
Status: DISCONTINUED | OUTPATIENT
Start: 2019-08-14 | End: 2019-08-14 | Stop reason: HOSPADM

## 2019-08-14 RX ORDER — AMMONIUM LACTATE 12 G/100G
1 CREAM TOPICAL 2 TIMES DAILY
Status: DISCONTINUED | OUTPATIENT
Start: 2019-08-14 | End: 2019-08-19 | Stop reason: HOSPADM

## 2019-08-14 RX ORDER — PROMETHAZINE HYDROCHLORIDE 25 MG/ML
12.5 INJECTION, SOLUTION INTRAMUSCULAR; INTRAVENOUS EVERY 6 HOURS PRN
Status: DISCONTINUED | OUTPATIENT
Start: 2019-08-14 | End: 2019-08-14 | Stop reason: HOSPADM

## 2019-08-14 RX ORDER — ASPIRIN 81 MG/1
81 TABLET, CHEWABLE ORAL DAILY
Status: DISCONTINUED | OUTPATIENT
Start: 2019-08-14 | End: 2019-08-19 | Stop reason: HOSPADM

## 2019-08-14 RX ORDER — ONDANSETRON 2 MG/ML
4 INJECTION INTRAMUSCULAR; INTRAVENOUS EVERY 6 HOURS PRN
Status: DISCONTINUED | OUTPATIENT
Start: 2019-08-14 | End: 2019-08-14

## 2019-08-14 RX ORDER — FERROUS SULFATE TAB EC 324 MG (65 MG FE EQUIVALENT) 324 (65 FE) MG
324 TABLET DELAYED RESPONSE ORAL 2 TIMES DAILY WITH MEALS
Status: DISCONTINUED | OUTPATIENT
Start: 2019-08-14 | End: 2019-08-19 | Stop reason: HOSPADM

## 2019-08-14 RX ORDER — BUDESONIDE 0.5 MG/2ML
0.5 INHALANT ORAL 2 TIMES DAILY
Status: DISCONTINUED | OUTPATIENT
Start: 2019-08-14 | End: 2019-08-14 | Stop reason: HOSPADM

## 2019-08-14 RX ORDER — METOPROLOL TARTRATE 50 MG/1
50 TABLET, FILM COATED ORAL 2 TIMES DAILY
Status: DISCONTINUED | OUTPATIENT
Start: 2019-08-14 | End: 2019-08-14

## 2019-08-14 RX ORDER — DILTIAZEM HYDROCHLORIDE 120 MG/1
120 CAPSULE, EXTENDED RELEASE ORAL ONCE
Status: COMPLETED | OUTPATIENT
Start: 2019-08-14 | End: 2019-08-14

## 2019-08-14 RX ORDER — BISACODYL 10 MG
10 SUPPOSITORY, RECTAL RECTAL DAILY PRN
Status: DISCONTINUED | OUTPATIENT
Start: 2019-08-14 | End: 2019-08-19 | Stop reason: HOSPADM

## 2019-08-14 RX ORDER — AMIODARONE HYDROCHLORIDE 200 MG/1
200 TABLET ORAL 2 TIMES DAILY WITH MEALS
Status: DISCONTINUED | OUTPATIENT
Start: 2019-08-14 | End: 2019-08-15

## 2019-08-14 RX ORDER — FUROSEMIDE 10 MG/ML
40 INJECTION INTRAMUSCULAR; INTRAVENOUS ONCE
Status: COMPLETED | OUTPATIENT
Start: 2019-08-14 | End: 2019-08-14

## 2019-08-14 RX ORDER — DEXTROSE MONOHYDRATE 25 G/50ML
25 INJECTION, SOLUTION INTRAVENOUS
Status: DISCONTINUED | OUTPATIENT
Start: 2019-08-14 | End: 2019-08-19 | Stop reason: HOSPADM

## 2019-08-14 RX ORDER — PANTOPRAZOLE SODIUM 40 MG/1
40 TABLET, DELAYED RELEASE ORAL DAILY
Status: DISCONTINUED | OUTPATIENT
Start: 2019-08-14 | End: 2019-08-19 | Stop reason: HOSPADM

## 2019-08-14 RX ORDER — LEVALBUTEROL INHALATION SOLUTION 0.63 MG/3ML
0.63 SOLUTION RESPIRATORY (INHALATION) 3 TIMES DAILY
Status: DISCONTINUED | OUTPATIENT
Start: 2019-08-14 | End: 2019-08-14

## 2019-08-14 RX ORDER — ISOSORBIDE MONONITRATE 30 MG/1
30 TABLET, EXTENDED RELEASE ORAL DAILY
Status: DISCONTINUED | OUTPATIENT
Start: 2019-08-14 | End: 2019-08-14 | Stop reason: HOSPADM

## 2019-08-14 RX ORDER — BISOPROLOL FUMARATE 5 MG/1
10 TABLET, FILM COATED ORAL ONCE
Status: COMPLETED | OUTPATIENT
Start: 2019-08-14 | End: 2019-08-14

## 2019-08-14 RX ORDER — CEFTRIAXONE 1 G/50ML
1 INJECTION, SOLUTION INTRAVENOUS EVERY 24 HOURS
Status: DISCONTINUED | OUTPATIENT
Start: 2019-08-15 | End: 2019-08-16

## 2019-08-14 RX ORDER — ONDANSETRON 2 MG/ML
4 INJECTION INTRAMUSCULAR; INTRAVENOUS EVERY 6 HOURS PRN
Status: DISCONTINUED | OUTPATIENT
Start: 2019-08-14 | End: 2019-08-19 | Stop reason: HOSPADM

## 2019-08-14 RX ORDER — HYDRALAZINE HYDROCHLORIDE 20 MG/ML
10 INJECTION INTRAMUSCULAR; INTRAVENOUS EVERY 6 HOURS PRN
Status: DISCONTINUED | OUTPATIENT
Start: 2019-08-14 | End: 2019-08-14 | Stop reason: HOSPADM

## 2019-08-14 RX ORDER — ACETAMINOPHEN 325 MG/1
650 TABLET ORAL EVERY 4 HOURS PRN
Status: DISCONTINUED | OUTPATIENT
Start: 2019-08-14 | End: 2019-08-19 | Stop reason: HOSPADM

## 2019-08-14 RX ORDER — DILTIAZEM HYDROCHLORIDE 180 MG/1
180 CAPSULE, COATED, EXTENDED RELEASE ORAL
Status: DISCONTINUED | OUTPATIENT
Start: 2019-08-14 | End: 2019-08-14

## 2019-08-14 RX ORDER — ZINC SULFATE 50(220)MG
220 CAPSULE ORAL 2 TIMES DAILY
Status: DISCONTINUED | OUTPATIENT
Start: 2019-08-14 | End: 2019-08-19 | Stop reason: HOSPADM

## 2019-08-14 RX ORDER — MECLIZINE HCL 12.5 MG/1
12.5 TABLET ORAL ONCE
Status: COMPLETED | OUTPATIENT
Start: 2019-08-14 | End: 2019-08-14

## 2019-08-14 RX ORDER — SODIUM BICARBONATE 650 MG/1
650 TABLET ORAL 3 TIMES DAILY
Status: DISCONTINUED | OUTPATIENT
Start: 2019-08-14 | End: 2019-08-14 | Stop reason: HOSPADM

## 2019-08-14 RX ORDER — ACETAMINOPHEN 325 MG/1
650 TABLET ORAL EVERY 6 HOURS PRN
Status: DISCONTINUED | OUTPATIENT
Start: 2019-08-14 | End: 2019-08-14 | Stop reason: HOSPADM

## 2019-08-14 RX ORDER — GUAIFENESIN 600 MG/1
600 TABLET, EXTENDED RELEASE ORAL 2 TIMES DAILY
Status: DISCONTINUED | OUTPATIENT
Start: 2019-08-14 | End: 2019-08-19 | Stop reason: HOSPADM

## 2019-08-14 RX ORDER — METOPROLOL TARTRATE 5 MG/5ML
5 INJECTION INTRAVENOUS EVERY 4 HOURS PRN
Status: DISCONTINUED | OUTPATIENT
Start: 2019-08-14 | End: 2019-08-19 | Stop reason: HOSPADM

## 2019-08-14 RX ORDER — ATORVASTATIN CALCIUM 10 MG/1
10 TABLET, FILM COATED ORAL NIGHTLY
Status: DISCONTINUED | OUTPATIENT
Start: 2019-08-14 | End: 2019-08-19 | Stop reason: HOSPADM

## 2019-08-14 RX ORDER — LEVALBUTEROL INHALATION SOLUTION 1.25 MG/3ML
1.25 SOLUTION RESPIRATORY (INHALATION)
Status: DISCONTINUED | OUTPATIENT
Start: 2019-08-14 | End: 2019-08-14 | Stop reason: HOSPADM

## 2019-08-14 RX ORDER — MULTIPLE VITAMINS W/ MINERALS TAB 9MG-400MCG
1 TAB ORAL DAILY
Status: DISCONTINUED | OUTPATIENT
Start: 2019-08-15 | End: 2019-08-19 | Stop reason: HOSPADM

## 2019-08-14 RX ORDER — SIMVASTATIN 20 MG
20 TABLET ORAL NIGHTLY
Status: DISCONTINUED | OUTPATIENT
Start: 2019-08-14 | End: 2019-08-14 | Stop reason: HOSPADM

## 2019-08-14 RX ORDER — PANTOPRAZOLE SODIUM 20 MG/1
20 TABLET, DELAYED RELEASE ORAL DAILY
Status: DISCONTINUED | OUTPATIENT
Start: 2019-08-14 | End: 2019-08-14 | Stop reason: HOSPADM

## 2019-08-14 RX ORDER — NITROGLYCERIN 0.4 MG/1
0.4 TABLET SUBLINGUAL
COMMUNITY
End: 2022-04-14 | Stop reason: HOSPADM

## 2019-08-14 RX ORDER — POLYETHYLENE GLYCOL 3350 17 G/17G
17 POWDER, FOR SOLUTION ORAL DAILY
Status: DISCONTINUED | OUTPATIENT
Start: 2019-08-14 | End: 2019-08-14 | Stop reason: HOSPADM

## 2019-08-14 RX ORDER — FERROUS SULFATE TAB EC 324 MG (65 MG FE EQUIVALENT) 324 (65 FE) MG
325 TABLET DELAYED RESPONSE ORAL 2 TIMES DAILY WITH MEALS
Status: DISCONTINUED | OUTPATIENT
Start: 2019-08-14 | End: 2019-08-14 | Stop reason: HOSPADM

## 2019-08-14 RX ORDER — SODIUM CHLORIDE 0.9 % (FLUSH) 0.9 %
3-10 SYRINGE (ML) INJECTION AS NEEDED
Status: DISCONTINUED | OUTPATIENT
Start: 2019-08-14 | End: 2019-08-19 | Stop reason: HOSPADM

## 2019-08-14 RX ORDER — DOCUSATE SODIUM 100 MG/1
100 CAPSULE, LIQUID FILLED ORAL 2 TIMES DAILY
Status: DISCONTINUED | OUTPATIENT
Start: 2019-08-14 | End: 2019-08-19 | Stop reason: HOSPADM

## 2019-08-14 RX ORDER — METHYLPREDNISOLONE SODIUM SUCCINATE 40 MG/ML
40 INJECTION, POWDER, LYOPHILIZED, FOR SOLUTION INTRAMUSCULAR; INTRAVENOUS EVERY 12 HOURS
Status: DISCONTINUED | OUTPATIENT
Start: 2019-08-14 | End: 2019-08-14 | Stop reason: HOSPADM

## 2019-08-14 RX ORDER — ASCORBIC ACID 500 MG
500 TABLET ORAL DAILY
Status: DISCONTINUED | OUTPATIENT
Start: 2019-08-15 | End: 2019-08-19 | Stop reason: HOSPADM

## 2019-08-14 RX ORDER — DILTIAZEM HYDROCHLORIDE 120 MG/1
120 CAPSULE, COATED, EXTENDED RELEASE ORAL DAILY
Status: DISCONTINUED | OUTPATIENT
Start: 2019-08-15 | End: 2019-08-14 | Stop reason: HOSPADM

## 2019-08-14 RX ORDER — NICOTINE POLACRILEX 4 MG
1 LOZENGE BUCCAL
Status: DISCONTINUED | OUTPATIENT
Start: 2019-08-14 | End: 2019-08-19 | Stop reason: HOSPADM

## 2019-08-14 RX ORDER — SODIUM CHLORIDE FOR INHALATION 3 %
4 VIAL, NEBULIZER (ML) INHALATION ONCE
Status: COMPLETED | OUTPATIENT
Start: 2019-08-14 | End: 2019-08-14

## 2019-08-14 RX ORDER — CEFTRIAXONE 1 G/50ML
1 INJECTION, SOLUTION INTRAVENOUS EVERY 24 HOURS
Status: DISCONTINUED | OUTPATIENT
Start: 2019-08-14 | End: 2019-08-14

## 2019-08-14 RX ORDER — LEVOTHYROXINE SODIUM 0.15 MG/1
150 TABLET ORAL
Status: DISCONTINUED | OUTPATIENT
Start: 2019-08-15 | End: 2019-08-19 | Stop reason: HOSPADM

## 2019-08-14 RX ORDER — BISOPROLOL FUMARATE 5 MG/1
10 TABLET, FILM COATED ORAL 2 TIMES DAILY
Status: DISCONTINUED | OUTPATIENT
Start: 2019-08-14 | End: 2019-08-14

## 2019-08-14 RX ORDER — SACCHAROMYCES BOULARDII 250 MG
250 CAPSULE ORAL 2 TIMES DAILY
Status: DISCONTINUED | OUTPATIENT
Start: 2019-08-14 | End: 2019-08-19 | Stop reason: HOSPADM

## 2019-08-14 RX ORDER — IPRATROPIUM BROMIDE AND ALBUTEROL SULFATE 2.5; .5 MG/3ML; MG/3ML
1 SOLUTION RESPIRATORY (INHALATION)
Status: DISCONTINUED | OUTPATIENT
Start: 2019-08-14 | End: 2019-08-14 | Stop reason: SDUPTHER

## 2019-08-14 RX ORDER — DOCUSATE SODIUM 100 MG/1
100 CAPSULE, LIQUID FILLED ORAL 2 TIMES DAILY
COMMUNITY

## 2019-08-14 RX ORDER — LIDOCAINE 50 MG/G
1 PATCH TOPICAL EVERY 24 HOURS
Status: DISCONTINUED | OUTPATIENT
Start: 2019-08-14 | End: 2019-08-19 | Stop reason: HOSPADM

## 2019-08-14 RX ORDER — ISOSORBIDE MONONITRATE 30 MG/1
15 TABLET, EXTENDED RELEASE ORAL DAILY
Status: DISCONTINUED | OUTPATIENT
Start: 2019-08-15 | End: 2019-08-19 | Stop reason: HOSPADM

## 2019-08-14 RX ORDER — DILTIAZEM HYDROCHLORIDE 180 MG/1
180 CAPSULE, COATED, EXTENDED RELEASE ORAL DAILY
Status: DISCONTINUED | OUTPATIENT
Start: 2019-08-14 | End: 2019-08-14

## 2019-08-14 RX ORDER — SODIUM CHLORIDE 0.9 % (FLUSH) 0.9 %
3 SYRINGE (ML) INJECTION EVERY 12 HOURS SCHEDULED
Status: DISCONTINUED | OUTPATIENT
Start: 2019-08-14 | End: 2019-08-19 | Stop reason: HOSPADM

## 2019-08-14 RX ORDER — ASPIRIN 81 MG/1
81 TABLET, CHEWABLE ORAL DAILY
Status: DISCONTINUED | OUTPATIENT
Start: 2019-08-14 | End: 2019-08-14 | Stop reason: HOSPADM

## 2019-08-14 RX ORDER — MAGNESIUM SULFATE 1 G/100ML
1 INJECTION INTRAVENOUS ONCE
Status: COMPLETED | OUTPATIENT
Start: 2019-08-14 | End: 2019-08-14

## 2019-08-14 RX ORDER — BENZONATATE 100 MG/1
100 CAPSULE ORAL 3 TIMES DAILY PRN
Status: DISCONTINUED | OUTPATIENT
Start: 2019-08-14 | End: 2019-08-14 | Stop reason: HOSPADM

## 2019-08-14 RX ORDER — DILTIAZEM HYDROCHLORIDE 240 MG/1
240 CAPSULE, COATED, EXTENDED RELEASE ORAL
Status: DISCONTINUED | OUTPATIENT
Start: 2019-08-15 | End: 2019-08-15

## 2019-08-14 RX ORDER — NITROGLYCERIN 0.4 MG/1
0.4 TABLET SUBLINGUAL
Status: DISCONTINUED | OUTPATIENT
Start: 2019-08-14 | End: 2019-08-19 | Stop reason: HOSPADM

## 2019-08-14 RX ORDER — POLYETHYLENE GLYCOL 3350 17 G/17G
17 POWDER, FOR SOLUTION ORAL 2 TIMES DAILY
Status: DISCONTINUED | OUTPATIENT
Start: 2019-08-14 | End: 2019-08-19 | Stop reason: HOSPADM

## 2019-08-14 RX ORDER — FLUCONAZOLE 100 MG/1
100 TABLET ORAL DAILY
Status: DISCONTINUED | OUTPATIENT
Start: 2019-08-14 | End: 2019-08-14 | Stop reason: HOSPADM

## 2019-08-14 RX ORDER — SODIUM CHLORIDE 0.9 % (FLUSH) 0.9 %
10 SYRINGE (ML) INJECTION EVERY 12 HOURS SCHEDULED
Status: DISCONTINUED | OUTPATIENT
Start: 2019-08-14 | End: 2019-08-14 | Stop reason: HOSPADM

## 2019-08-14 RX ORDER — SODIUM CHLORIDE 0.9 % (FLUSH) 0.9 %
10 SYRINGE (ML) INJECTION PRN
Status: DISCONTINUED | OUTPATIENT
Start: 2019-08-14 | End: 2019-08-14 | Stop reason: HOSPADM

## 2019-08-14 RX ORDER — LEVOTHYROXINE SODIUM 0.07 MG/1
150 TABLET ORAL DAILY
Status: DISCONTINUED | OUTPATIENT
Start: 2019-08-14 | End: 2019-08-14 | Stop reason: HOSPADM

## 2019-08-14 RX ADMIN — SODIUM CHLORIDE SOLN NEBU 3% 4 ML: 3 NEBU SOLN at 19:31

## 2019-08-14 RX ADMIN — ISOSORBIDE MONONITRATE 30 MG: 30 TABLET, EXTENDED RELEASE ORAL at 08:31

## 2019-08-14 RX ADMIN — MAGNESIUM SULFATE HEPTAHYDRATE 1 G: 1 INJECTION, SOLUTION INTRAVENOUS at 09:39

## 2019-08-14 RX ADMIN — IPRATROPIUM BROMIDE AND ALBUTEROL SULFATE 1 AMPULE: .5; 3 SOLUTION RESPIRATORY (INHALATION) at 09:56

## 2019-08-14 RX ADMIN — APIXABAN 5 MG: 5 TABLET, FILM COATED ORAL at 08:31

## 2019-08-14 RX ADMIN — Medication 10 ML: at 08:35

## 2019-08-14 RX ADMIN — SODIUM CHLORIDE, PRESERVATIVE FREE 3 ML: 5 INJECTION INTRAVENOUS at 20:59

## 2019-08-14 RX ADMIN — Medication 250 MG: at 20:58

## 2019-08-14 RX ADMIN — METOPROLOL TARTRATE 25 MG: 25 TABLET ORAL at 06:30

## 2019-08-14 RX ADMIN — FERROUS SULFATE TAB EC 324 MG (65 MG FE EQUIVALENT) 325 MG: 324 (65 FE) TABLET DELAYED RESPONSE at 08:31

## 2019-08-14 RX ADMIN — CEFTRIAXONE 1 G: 1 INJECTION, POWDER, FOR SOLUTION INTRAMUSCULAR; INTRAVENOUS at 11:25

## 2019-08-14 RX ADMIN — BUDESONIDE 500 MCG: 0.5 SUSPENSION RESPIRATORY (INHALATION) at 10:02

## 2019-08-14 RX ADMIN — Medication 1 APPLICATION: at 20:59

## 2019-08-14 RX ADMIN — SODIUM BICARBONATE TAB 650 MG 650 MG: 650 TAB at 08:31

## 2019-08-14 RX ADMIN — INSULIN ASPART 12 UNITS: 100 INJECTION, SOLUTION INTRAVENOUS; SUBCUTANEOUS at 20:58

## 2019-08-14 RX ADMIN — MECLIZINE 12.5 MG: 12.5 TABLET ORAL at 15:07

## 2019-08-14 RX ADMIN — LEVOTHYROXINE SODIUM 150 MCG: 75 TABLET ORAL at 08:31

## 2019-08-14 RX ADMIN — DILTIAZEM HYDROCHLORIDE 180 MG: 180 CAPSULE, COATED, EXTENDED RELEASE ORAL at 11:26

## 2019-08-14 RX ADMIN — ASPIRIN 81 MG 81 MG: 81 TABLET ORAL at 08:31

## 2019-08-14 RX ADMIN — FLUCONAZOLE 100 MG: 100 TABLET ORAL at 08:41

## 2019-08-14 RX ADMIN — INSULIN LISPRO 2 UNITS: 100 INJECTION, SOLUTION INTRAVENOUS; SUBCUTANEOUS at 11:24

## 2019-08-14 RX ADMIN — DOCUSATE SODIUM 100 MG: 100 CAPSULE, LIQUID FILLED ORAL at 20:57

## 2019-08-14 RX ADMIN — METHYLPREDNISOLONE SODIUM SUCCINATE 40 MG: 40 INJECTION, POWDER, FOR SOLUTION INTRAMUSCULAR; INTRAVENOUS at 11:23

## 2019-08-14 RX ADMIN — Medication 220 MG: at 20:57

## 2019-08-14 RX ADMIN — APIXABAN 5 MG: 5 TABLET, FILM COATED ORAL at 20:58

## 2019-08-14 RX ADMIN — METOPROLOL TARTRATE 5 MG: 1 INJECTION, SOLUTION INTRAVENOUS at 23:49

## 2019-08-14 RX ADMIN — DOCUSATE SODIUM 100 MG: 100 CAPSULE, LIQUID FILLED ORAL at 08:31

## 2019-08-14 RX ADMIN — METOPROLOL TARTRATE 25 MG: 25 TABLET ORAL at 09:10

## 2019-08-14 RX ADMIN — HYDRALAZINE HYDROCHLORIDE 10 MG: 20 INJECTION INTRAMUSCULAR; INTRAVENOUS at 06:10

## 2019-08-14 RX ADMIN — INSULIN LISPRO 1 UNITS: 100 INJECTION, SOLUTION INTRAVENOUS; SUBCUTANEOUS at 08:41

## 2019-08-14 RX ADMIN — AMIODARONE HYDROCHLORIDE 200 MG: 200 TABLET ORAL at 21:04

## 2019-08-14 RX ADMIN — BISOPROLOL FUMARATE 10 MG: 5 TABLET ORAL at 21:53

## 2019-08-14 RX ADMIN — ATORVASTATIN CALCIUM 10 MG: 10 TABLET, FILM COATED ORAL at 20:58

## 2019-08-14 RX ADMIN — IPRATROPIUM BROMIDE 0.5 MG: 0.5 SOLUTION RESPIRATORY (INHALATION) at 19:31

## 2019-08-14 RX ADMIN — SODIUM BICARBONATE TAB 650 MG 650 MG: 650 TAB at 15:07

## 2019-08-14 RX ADMIN — Medication 25 MG: at 06:30

## 2019-08-14 RX ADMIN — PANTOPRAZOLE SODIUM 20 MG: 20 TABLET, DELAYED RELEASE ORAL at 08:31

## 2019-08-14 RX ADMIN — DILTIAZEM HYDROCHLORIDE 120 MG: 120 CAPSULE, EXTENDED RELEASE ORAL at 21:53

## 2019-08-14 RX ADMIN — BENZONATATE 100 MG: 100 CAPSULE ORAL at 15:10

## 2019-08-14 RX ADMIN — IPRATROPIUM BROMIDE AND ALBUTEROL SULFATE 1 AMPULE: .5; 3 SOLUTION RESPIRATORY (INHALATION) at 05:34

## 2019-08-14 RX ADMIN — GUAIFENESIN 600 MG: 600 TABLET, EXTENDED RELEASE ORAL at 20:59

## 2019-08-14 RX ADMIN — FUROSEMIDE 40 MG: 10 INJECTION, SOLUTION INTRAMUSCULAR; INTRAVENOUS at 18:47

## 2019-08-14 NOTE — CONSULTS
 Asthma Mother     High Blood Pressure Father     Stroke Father      Social History     Tobacco Use    Smoking status: Never Smoker    Smokeless tobacco: Never Used   Substance Use Topics    Alcohol use: No    Drug use: No       Allergies   Allergen Reactions    Metformin And Related      HA, diarrhea, throat swelling     Current Facility-Administered Medications   Medication Dose Route Frequency Provider Last Rate Last Dose    acetaminophen (TYLENOL) tablet 650 mg  650 mg Oral Q6H PRN Mo Crowell MD        apixaban Brittney Fortis) tablet 5 mg  5 mg Oral BID Mo Crowell MD   5 mg at 08/14/19 0831    aspirin chewable tablet 81 mg  81 mg Oral Daily Mo Crowell MD   81 mg at 08/14/19 0831    docusate sodium (COLACE) capsule 100 mg  100 mg Oral BID Mo Crowell MD   100 mg at 08/14/19 0831    ferrous sulfate EC tablet 325 mg  325 mg Oral BID WC Mo Crowell MD   325 mg at 08/14/19 0831    isosorbide mononitrate (IMDUR) extended release tablet 30 mg  30 mg Oral Daily Mo Crowell MD   30 mg at 08/14/19 0831    levothyroxine (SYNTHROID) tablet 150 mcg  150 mcg Oral Daily Mo Crowell MD   150 mcg at 08/14/19 0831    pantoprazole (PROTONIX) tablet 20 mg  20 mg Oral Daily Mo Crowell MD   20 mg at 08/14/19 0831    polyethylene glycol (GLYCOLAX) packet 17 g  17 g Oral Daily Mo Crowell MD        simvastatin (ZOCOR) tablet 20 mg  20 mg Oral Nightly Mo Crowell MD        sodium bicarbonate tablet 650 mg  650 mg Oral TID Mo Crowell MD   650 mg at 08/14/19 0831    sodium chloride flush 0.9 % injection 10 mL  10 mL Intravenous 2 times per day Mo Crowell MD   10 mL at 08/14/19 0835    sodium chloride flush 0.9 % injection 10 mL  10 mL Intravenous PRN Mo Crowell MD        magnesium hydroxide (MILK OF MAGNESIA) 400 MG/5ML suspension 30 mL  30 mL Oral Daily PRN Mo Crowell MD        cefTRIAXone (ROCEPHIN) 1 g IVPB in 50 mL D5W minibag  1 g Intravenous Q24H Mo Crowell  mL/hr at evening and remain n.p.o. after midnight. We will proceed with transesophageal echocardiogram in the morning to exclude left atrial appendage thrombus or other high risk features which will preclude cardioversion. If the patient's CED is acceptable, we will proceed with synchronized external cardioversion. Thank you very much for allowing me to participate in the care of your patient. Please do not hesitate to contact me if you have any questions.     Electronically Signed by Gee Bustamante MD on 8/14/2019 at 2:54 PM

## 2019-08-14 NOTE — ED NOTES
[facility] called at this time, updated that patient is being admitted.      Willie Mckinney RN  08/13/19 2009

## 2019-08-14 NOTE — PLAN OF CARE
Problem: Patient Care Overview  Goal: Plan of Care Review  Outcome: Ongoing (interventions implemented as appropriate)      Problem: Fall Risk (Adult)  Goal: Identify Related Risk Factors and Signs and Symptoms  Outcome: Ongoing (interventions implemented as appropriate)      Problem: Skin Injury Risk (Adult)  Goal: Identify Related Risk Factors and Signs and Symptoms  Outcome: Ongoing (interventions implemented as appropriate)      Problem: Pain, Chronic (Adult)  Goal: Identify Related Risk Factors and Signs and Symptoms  Outcome: Ongoing (interventions implemented as appropriate)      Problem: Arrhythmia/Dysrhythmia (Symptomatic) (Adult)  Goal: Signs and Symptoms of Listed Potential Problems Will be Absent, Minimized or Managed (Arrhythmia/Dysrhythmia)  Outcome: Ongoing (interventions implemented as appropriate)

## 2019-08-14 NOTE — H&P
[facility]   HISTORY AND PHYSICAL      Name:  Millicent Mckeon   Age:  59 y.o.  Sex:  female  :  1960  MRN:  [id number]   Visit Number:  [id number]  Admission Date:  2019  Date Of Service:  19  Primary Care Physician:  Marianela Albright APRN    History Obtained From:    patient and consulting provider    Chief Complaint:     Arrhythmia    History Of Presenting Illness:      Patient is a 59-year-old  female with history of a flutter, essential hypertension, morbid obesity, hypothyroidism, diabetes mellitus type 2 who is transferred from [facility] at the request of Dr. Aceves due to persistent a flutter.  She has been tried on diltiazem, beta-blocker, dig, amiodarone without relief.  She is on Eliquis.  She is sent over here for cardioversion tomorrow morning per Dr. Aceves.  She is asymptomatic with this.  She does have mild congestive heart failure on her chest x-ray.  Patient states she has had a productive cough with yellow sputum, decreased appetite, and fever and chills.  She has been at a nursing home in the past 6 months, and respiratory symptoms are why she was sent in to the hospital.  Her troponin was negative over there.  WBC count was actually 3.9.  She was placed on Rocephin, she states she feels better.  She also follows with Dr. Leone, her creatinine is 1.7 today.  Will consult him as he has followed her in the past intermittently.    She currently denies any pain.  She denies any chest pressure, shortness of breath, nausea or vomiting.  Her cough is improved.  She has not ambulated for at least 6 months.  She has significant peripheral edema of her legs.  Her weight is well over 300 pounds.  She is admitted for cardiac evaluation.  She refuses PT and OT evaluation here.  Reviewed echocardiogram from 2019 which showed preserved ejection fraction with pulmonary hypertension.      Review Of Systems:     General ROS: positive  for  - fatigue and malaise  Psychological ROS: negative  Ophthalmic ROS: negative  ENT ROS: negative  Allergy and Immunology ROS: negative  Hematological and Lymphatic ROS: negative  Endocrine ROS: negative  Breast ROS: negative  Respiratory ROS: positive for - cough, shortness of breath and sputum changes  Cardiovascular ROS: positive for - irregular heartbeat and rapid heart rate  Gastrointestinal ROS: negative  Genito-Urinary ROS: negative  Musculoskeletal ROS: positive for - muscular weakness  Neurological ROS: negative  Dermatological ROS: negative       Past Medical History:    A flutter, GERD, hypertension, hypothyroidism, thrombocytopenia, diabetes mellitus type 2, morbid obesity    Past Surgical history:    Cataract surgery, left hip surgery, colonoscopy and EGD in October 2018      Social History:    Patient never smoked, does not drink alcohol or use drugs.  Her siblings are power of .  She has no children and has never been .  She wants to be a full code.    Family History:    Mother had asthma, father had hypertension and CVA    Allergies:      Metformin and related    Home Medications:    Prior to Admission Medications     Prescriptions Last Dose Informant Patient Reported? Taking?    acetaminophen (TYLENOL) 325 MG tablet   Yes No    Take 650 mg by mouth Every 4 (Four) Hours As Needed for Mild Pain .    ammonium lactate (AMLACTIN) 12 % cream   Yes No    Apply 1 application topically to the appropriate area as directed 2 (Two) Times a Day.    arginine 500 MG tablet   Yes No    Take 500 mg by mouth Daily.    aspirin 81 MG chewable tablet   Yes No    Chew 81 mg Daily.    bisoprolol (ZEBeta) 10 MG tablet   Yes No    2 (Two) Times a Day.    diltiaZEM (CARDIZEM) 120 MG tablet   Yes No    Daily.    docusate sodium (COLACE) 100 MG capsule   Yes No    Take 100 mg by mouth 2 (Two) Times a Day.    Elastic Bandages & Supports (JOBST OPAQUE KNEE 20-30MMHG XL) misc   No No    1 application Daily.     ferrous sulfate 324 (65 Fe) MG tablet delayed-release EC tablet   Yes No    Take 324 mg by mouth 2 (Two) Times a Day With Meals.    insulin aspart (novoLOG) 100 UNIT/ML injection   Yes No    Inject  under the skin into the appropriate area as directed 3 (Three) Times a Day Before Meals. Sliding scale, low dose    isosorbide mononitrate (ISMO,MONOKET) 10 MG tablet   Yes No    Take 15 mg by mouth Daily.    levothyroxine (SYNTHROID, LEVOTHROID) 150 MCG tablet   Yes No    Take 150 mcg by mouth Daily.    lidocaine (LIDODERM) 5 %   Yes No    Place 1 patch on the skin as directed by provider Daily. Apply patch to left knee daily, on at 0700 am off at 2000    Multiple Vitamins-Minerals (MULTIVITAMIN WITH MINERALS) tablet tablet   Yes No    Take 1 tablet by mouth Daily.    nitroglycerin (NITROSTAT) 0.4 MG SL tablet   Yes No    Place 0.4 mg under the tongue Every 5 (Five) Minutes As Needed for Chest Pain. Take no more than 3 doses in 15 minutes.    Nutritional Supplements (PROTEIN SUPPLEMENT 80% PO)   Yes No    Take 30 mL by mouth 2 (Two) Times a Day.    pantoprazole (PROTONIX) 40 MG EC tablet   Yes No    Take 40 mg by mouth Daily.    polyethylene glycol (MIRALAX) packet   Yes No    Take 17 g by mouth 2 (Two) Times a Day.    RA VITAMIN C 500 MG tablet   Yes No    Take 500 mg by mouth Daily.    saccharomyces boulardii (FLORASTOR) 250 MG capsule   Yes No    Take 250 mg by mouth 2 (Two) Times a Day.    simvastatin (ZOCOR) 20 MG tablet   Yes No    Take 20 mg by mouth Every Night.    Zinc Sulfate 220 (50 Zn) MG tablet   Yes No    Take 1 tablet by mouth 2 (Two) Times a Day.             Hospital Scheduled Meds:      ammonium lactate 1 application Topical BID   apixaban 5 mg Oral Q12H   aspirin 81 mg Oral Daily   atorvastatin 10 mg Oral Daily   bisoprolol 10 mg Oral BID   ceftriaxone 1 g Intravenous Q24H   diltiaZEM  mg Oral Q24H   docusate sodium 100 mg Oral BID   ferrous sulfate 324 mg Oral BID With Meals   furosemide 40 mg  Intravenous Once   guaiFENesin 600 mg Oral BID   insulin aspart 0-9 Units Subcutaneous 4x Daily AC & at Bedtime   ipratropium 0.5 mg Nebulization 4x Daily - RT   isosorbide mononitrate 15 mg Oral Daily   levothyroxine 150 mcg Oral Daily   lidocaine 1 patch Transdermal Q24H   multivitamin with minerals 1 tablet Oral Daily   pantoprazole 40 mg Oral Daily   polyethylene glycol 17 g Oral BID   saccharomyces boulardii 250 mg Oral BID   sodium chloride 3 mL Intravenous Q12H   ascorbic acid 500 mg Oral Daily   zinc sulfate 220 mg Oral BID             Vital Signs:    Temp:  [98.6 °F (37 °C)] 98.6 °F (37 °C)  Heart Rate:  [116-117] 117  Resp:  [18] 18  BP: (144-150)/() 144/97    There were no vitals filed for this visit.    There is no height or weight on file to calculate BMI.    Physical Exam:      General Appearance:    Alert, cooperative, in no acute distress, morbidly obese   Head:    Normocephalic, without obvious abnormality, atraumatic   Eyes:            Lids and lashes normal, conjunctivae and sclerae normal, no   icterus, no pallor, corneas clear, PERRLA   Ears:    Ears appear intact with no abnormalities noted   Throat:   No oral lesions, no thrush, oral mucosa moist   Neck:   No adenopathy, supple, trachea midline, no thyromegaly, no     carotid bruit, no JVD   Back:     No kyphosis present, no scoliosis present, no skin lesions,       erythema or scars, no tenderness to percussion or                   palpation,   range of motion normal   Lungs:     Clear to auscultation,respirations regular, even and                   unlabored    Heart:   Irregular rhythm with increased rate, no murmur, no gallop, no rub, no click   Breast Exam:    Deferred   Abdomen:     Normal bowel sounds, no masses, no organomegaly, soft        non-tender, non-distended, no guarding, no rebound                 tenderness   Genitalia:    Deferred   Extremities:   Moves all extremities 4/5 strength, significant peripheral edema, no  cyanosis, no   redness   Pulses:   Pulses palpable and equal bilaterally   Skin:   No bleeding, bruising or rash   Lymph nodes:   No palpable adenopathy   Neurologic:   Cranial nerves 2 - 12 grossly intact, sensation intact, DTR        present and equal bilaterally       EKG:      A flutter with RVR.  Diffuse inverted T waves.    Telemetry:      Flutter with RVR    I have personally looked at both the EKG and the telemetry strips.    Labs:    Results from last 7 days   Lab Units 08/13/19  1745   WBC K/uL 3.9*   HEMOGLOBIN g/dL 10.8*   HEMATOCRIT % 36.1*   MCV fL 84.3   MCHC g/dL 29.9*   PLATELETS K/uL 131*               Invalid input(s): PROTCrCl cannot be calculated (Patient's most recent lab result is older than the maximum 30 days allowed.).  No results found for: AMMONIA  Results from last 7 days   Lab Units 08/13/19  1745   TROPONIN I ng/mL <0.30         Lab Results   Component Value Date    HGBA1C 8.4 (H) 04/29/2019     Lab Results   Component Value Date    TSH 4.06 06/05/2019    FREET4 0.82 01/12/2019     No results found for: PREGTESTUR, PREGSERUM, HCG, HCGQUANT  Pain Management Panel     Pain Management Panel Latest Ref Rng & Units 4/30/2019 4/29/2019    CREATININE UR mg/dL 47.3 34.3                          Radiology:    Imaging Results (last 7 days)     ** No results found for the last 168 hours. **          Assessment:    1.  Refractory a flutter with RVR  2.  Acute bronchitis, present on admission, on Rocephin  3.  Mild CHF, diastolic  4.  Morbid obesity with pickwickian syndrome  5.  Pulmonary hypertension  6.  Chronic hypoxic respiratory failure on O2  7.  Diabetes mellitus type 2  8.  Essential hypertension  9.  Hypothyroidism  10.  Chronic thrombocytopenia  11.  Leukopenia  12.  Chronic kidney disease stage III    Plan:     We will consult Dr. Martinez  N.p.o. after midnight for cardioversion in the a.m. with SACHIN.  Consult Dr. Leone as the patient is known to him.  Check lab work in the a.m.   Placed on insulin sliding scale.  Check procalcitonin.  Continue with Rocephin Atrovent, and guaifenesin for now.  Will give dose of Lasix 40 mg IV now.  Repeat chest x-ray in the a.m.  Continue with Eliquis for DVT and stroke prophylaxis.  Continue with other home medications.  Patient refuses PT and OT.  Further recommendations will depend on the clinical course.    Elmer Lopez,   08/14/19  5:05 PM

## 2019-08-14 NOTE — DISCHARGE SUMMARY
11/27/18  Yes JOSE Garcia   glucose monitoring kit (FREESTYLE) monitoring kit 1 kit by Does not apply route daily as needed (elevated glucose) Dx e 11.9 6/11/19   JOSE Garcia   blood glucose monitor strips Test 3 times a day & as needed for symptoms of irregular blood glucose. Dx E11.9 Freestyle Lite 6/3/19   JOSE Garcia   ammonium lactate (AMLACTIN) 12 % cream Apply topically 2 times daily Apply topically as needed. Historical Provider, MD   acetaminophen (TYLENOL) 325 MG tablet Take 650 mg by mouth every 6 hours as needed for Pain    Historical Provider, MD   blood glucose test strips (FREESTYLE TEST STRIPS) strip Daily dx:250.00 7/26/18   JOSE Garcia       Vital Signs  Temp: 98 °F (36.7 °C)  Pulse: 120  Resp: 20  BP: (!) 140/80  SpO2: 93 %  O2 Device: None (Room air)       Vital signs reviewed in electronic chart. Physical exam  Constitutional:  Well developed, obese, no acute distress  Eyes:  PERRL, no scleral icterus, conjunctiva normal   HENT:  Atraumatic, external ears normal, nose normal, oropharynx moist, no pharyngeal exudates. Neck- supple, no JVD, no lymphadenopathy  Respiratory:  No respiratory distress on room air, inspiratory and expiratory rhonchi bilaterally, no wheezing  Cardiovascular:  tachycardic, normal rhythm, no murmurs, no gallops, no rubs, trace to +1 ble   GI:  Soft, obese, nondistended, normal bowel sounds, nontender, no voluntary guarding  Musculoskeletal:  No cyanosis or obvious acute deformity. Moving all extremities. Chronic weakness BLE. Integument:  Warm and dry.  Chronic skin changes BLE and chronic lymphedema noted.   Neurologic:  Alert & oriented x 3, no apparent focal deficits noted   Psychiatric:  Speech and behavior appropriate      Lab Results   Component Value Date    WBC 3.9 (L) 08/13/2019    HGB 10.8 (L) 08/13/2019    HCT 36.1 (L) 08/13/2019    MCV 84.3 08/13/2019     (L) 08/13/2019       Lab Results   Component Value Date

## 2019-08-14 NOTE — H&P
(ZINCATE) 220 (50 Zn) MG capsule Take 220 mg by mouth 2 times daily      arginine 500 MG tablet Take 500 mg by mouth daily      insulin aspart (NOVOLOG) 100 UNIT/ML injection vial Inject into the skin 3 times daily (before meals) Per low dose sliding scale protocol (Patient was receiving Novolog in nursing home, Humalog is waiting at pharmacy to be picked up). Multiple Vitamins-Minerals (THERAPEUTIC MULTIVITAMIN-MINERALS) tablet Take 1 tablet by mouth daily      simvastatin (ZOCOR) 20 MG tablet take 1 tablet by mouth at bedtime  Qty: 30 tablet, Refills: 5      RA VITAMIN C 500 MG tablet TAKE 1 TABLET BY MOUTH DAILY  Qty: 30 tablet, Refills: 5      glucose monitoring kit (FREESTYLE) monitoring kit 1 kit by Does not apply route daily as needed (elevated glucose) Dx e 11.9  Qty: 1 kit, Refills: 0      !! blood glucose monitor strips Test 3 times a day & as needed for symptoms of irregular blood glucose. Dx E11.9 Freestyle Lite  Qty: 100 strip, Refills: 5    Comments: Brand per patient preference. May round up to next available package size. ammonium lactate (AMLACTIN) 12 % cream Apply topically 2 times daily Apply topically as needed. acetaminophen (TYLENOL) 325 MG tablet Take 650 mg by mouth every 6 hours as needed for Pain      !! blood glucose test strips (FREESTYLE TEST STRIPS) strip Daily dx:250.00  Qty: 100 each, Refills: 5    Associated Diagnoses: Type 2 diabetes mellitus with complication, with long-term current use of insulin ([address]       ! ! - Potential duplicate medications found. Please discuss with provider. Patient was seen and examined by Dr. Teresa Perla and plan of care reviewed. Signed:  Electronically signed by KAREN Mcintosh on 8/14/2019 at 1:21 PM       Thank you JOSE Garcia for the opportunity to be involved in this patient's care. If you have any questions or concerns please feel free to contact me at [phone].

## 2019-08-15 ENCOUNTER — APPOINTMENT (OUTPATIENT)
Dept: GENERAL RADIOLOGY | Facility: HOSPITAL | Age: 59
End: 2019-08-15

## 2019-08-15 ENCOUNTER — CARE COORDINATION (OUTPATIENT)
Dept: CARE COORDINATION | Age: 59
End: 2019-08-15

## 2019-08-15 ENCOUNTER — APPOINTMENT (OUTPATIENT)
Dept: CARDIOLOGY | Facility: HOSPITAL | Age: 59
End: 2019-08-15

## 2019-08-15 LAB
ALBUMIN SERPL-MCNC: 3.6 G/DL (ref 3.5–5.2)
ALBUMIN/GLOB SERPL: 1.2 G/DL
ALP SERPL-CCNC: 152 U/L (ref 39–117)
ALT SERPL W P-5'-P-CCNC: 11 U/L (ref 1–33)
ANION GAP SERPL CALCULATED.3IONS-SCNC: 11.7 MMOL/L (ref 5–15)
ANISOCYTOSIS BLD QL: NORMAL
AST SERPL-CCNC: 15 U/L (ref 1–32)
BACTERIA SPEC RESP CULT: NORMAL
BASOPHILS # BLD AUTO: 0.02 10*3/MM3 (ref 0–0.2)
BASOPHILS NFR BLD AUTO: 0.4 % (ref 0–1.5)
BH CV ECHO MEAS - RAP SYSTOLE: 12 MMHG
BH CV ECHO MEAS - RVSP: 44 MMHG
BILIRUB SERPL-MCNC: 0.3 MG/DL (ref 0.2–1.2)
BUN BLD-MCNC: 30 MG/DL (ref 8–23)
BUN/CREAT SERPL: 16.2 (ref 7–25)
CALCIUM SPEC-SCNC: 9 MG/DL (ref 8.6–10.5)
CHLORIDE SERPL-SCNC: 106 MMOL/L (ref 98–107)
CO2 SERPL-SCNC: 23.3 MMOL/L (ref 22–29)
CREAT BLD-MCNC: 1.85 MG/DL (ref 0.57–1)
DEPRECATED RDW RBC AUTO: 59.8 FL (ref 37–54)
EOSINOPHIL # BLD AUTO: 0.01 10*3/MM3 (ref 0–0.4)
EOSINOPHIL NFR BLD AUTO: 0.2 % (ref 0.3–6.2)
ERYTHROCYTE [DISTWIDTH] IN BLOOD BY AUTOMATED COUNT: 20.4 % (ref 12.3–15.4)
GFR SERPL CREATININE-BSD FRML MDRD: 28 ML/MIN/1.73
GFR SERPL CREATININE-BSD FRML MDRD: 34 ML/MIN/1.73
GLOBULIN UR ELPH-MCNC: 3.1 GM/DL
GLUCOSE BLD-MCNC: 238 MG/DL (ref 65–99)
GLUCOSE BLDC GLUCOMTR-MCNC: 186 MG/DL (ref 70–130)
GLUCOSE BLDC GLUCOMTR-MCNC: 206 MG/DL (ref 70–130)
GLUCOSE BLDC GLUCOMTR-MCNC: 212 MG/DL (ref 70–130)
GLUCOSE BLDC GLUCOMTR-MCNC: 246 MG/DL (ref 70–130)
GLUCOSE BLDC GLUCOMTR-MCNC: 250 MG/DL (ref 70–130)
HCT VFR BLD AUTO: 32.2 % (ref 34–46.6)
HGB BLD-MCNC: 9.7 G/DL (ref 12–15.9)
HYPOCHROMIA BLD QL: NORMAL
IMM GRANULOCYTES # BLD AUTO: 0.01 10*3/MM3 (ref 0–0.05)
IMM GRANULOCYTES NFR BLD AUTO: 0.2 % (ref 0–0.5)
LYMPHOCYTES # BLD AUTO: 0.79 10*3/MM3 (ref 0.7–3.1)
LYMPHOCYTES NFR BLD AUTO: 17.2 % (ref 19.6–45.3)
MCH RBC QN AUTO: 24.4 PG (ref 26.6–33)
MCHC RBC AUTO-ENTMCNC: 30.1 G/DL (ref 31.5–35.7)
MCV RBC AUTO: 81.1 FL (ref 79–97)
MONOCYTES # BLD AUTO: 0.53 10*3/MM3 (ref 0.1–0.9)
MONOCYTES NFR BLD AUTO: 11.5 % (ref 5–12)
NEUTROPHILS # BLD AUTO: 3.24 10*3/MM3 (ref 1.7–7)
NEUTROPHILS NFR BLD AUTO: 70.5 % (ref 42.7–76)
NRBC BLD AUTO-RTO: 0 /100 WBC (ref 0–0.2)
PLATELET # BLD AUTO: 134 10*3/MM3 (ref 140–450)
PMV BLD AUTO: 10.2 FL (ref 6–12)
POIKILOCYTOSIS BLD QL SMEAR: NORMAL
POTASSIUM BLD-SCNC: 4.5 MMOL/L (ref 3.5–5.2)
PROT SERPL-MCNC: 6.7 G/DL (ref 6–8.5)
RBC # BLD AUTO: 3.97 10*6/MM3 (ref 3.77–5.28)
SMALL PLATELETS BLD QL SMEAR: ADEQUATE
SODIUM BLD-SCNC: 141 MMOL/L (ref 136–145)
TROPONIN T SERPL-MCNC: <0.01 NG/ML (ref 0–0.03)
TSH SERPL DL<=0.05 MIU/L-ACNC: 4.2 MIU/ML (ref 0.27–4.2)
WBC MORPH BLD: NORMAL
WBC NRBC COR # BLD: 4.6 10*3/MM3 (ref 3.4–10.8)

## 2019-08-15 PROCEDURE — 84156 ASSAY OF PROTEIN URINE: CPT | Performed by: INTERNAL MEDICINE

## 2019-08-15 PROCEDURE — 93320 DOPPLER ECHO COMPLETE: CPT

## 2019-08-15 PROCEDURE — B246ZZ4 ULTRASONOGRAPHY OF RIGHT AND LEFT HEART, TRANSESOPHAGEAL: ICD-10-PCS | Performed by: INTERNAL MEDICINE

## 2019-08-15 PROCEDURE — 93320 DOPPLER ECHO COMPLETE: CPT | Performed by: INTERNAL MEDICINE

## 2019-08-15 PROCEDURE — 25010000002 FENTANYL CITRATE (PF) 100 MCG/2ML SOLUTION: Performed by: INTERNAL MEDICINE

## 2019-08-15 PROCEDURE — 63710000001 INSULIN ASPART PER 5 UNITS: Performed by: INTERNAL MEDICINE

## 2019-08-15 PROCEDURE — 84484 ASSAY OF TROPONIN QUANT: CPT | Performed by: INTERNAL MEDICINE

## 2019-08-15 PROCEDURE — 92960 CARDIOVERSION ELECTRIC EXT: CPT

## 2019-08-15 PROCEDURE — 25010000002 MIDAZOLAM PER 1 MG: Performed by: INTERNAL MEDICINE

## 2019-08-15 PROCEDURE — 99152 MOD SED SAME PHYS/QHP 5/>YRS: CPT

## 2019-08-15 PROCEDURE — 93312 ECHO TRANSESOPHAGEAL: CPT

## 2019-08-15 PROCEDURE — 94799 UNLISTED PULMONARY SVC/PX: CPT

## 2019-08-15 PROCEDURE — 84443 ASSAY THYROID STIM HORMONE: CPT | Performed by: INTERNAL MEDICINE

## 2019-08-15 PROCEDURE — 93325 DOPPLER ECHO COLOR FLOW MAPG: CPT

## 2019-08-15 PROCEDURE — 85025 COMPLETE CBC W/AUTO DIFF WBC: CPT | Performed by: INTERNAL MEDICINE

## 2019-08-15 PROCEDURE — 93005 ELECTROCARDIOGRAM TRACING: CPT | Performed by: INTERNAL MEDICINE

## 2019-08-15 PROCEDURE — 93312 ECHO TRANSESOPHAGEAL: CPT | Performed by: INTERNAL MEDICINE

## 2019-08-15 PROCEDURE — 93325 DOPPLER ECHO COLOR FLOW MAPG: CPT | Performed by: INTERNAL MEDICINE

## 2019-08-15 PROCEDURE — 92960 CARDIOVERSION ELECTRIC EXT: CPT | Performed by: INTERNAL MEDICINE

## 2019-08-15 PROCEDURE — 5A2204Z RESTORATION OF CARDIAC RHYTHM, SINGLE: ICD-10-PCS | Performed by: INTERNAL MEDICINE

## 2019-08-15 PROCEDURE — 25010000002 CEFTRIAXONE SODIUM-DEXTROSE 1-3.74 GM-%(50ML) RECONSTITUTED SOLUTION: Performed by: INTERNAL MEDICINE

## 2019-08-15 PROCEDURE — 99232 SBSQ HOSP IP/OBS MODERATE 35: CPT | Performed by: INTERNAL MEDICINE

## 2019-08-15 PROCEDURE — 82962 GLUCOSE BLOOD TEST: CPT

## 2019-08-15 PROCEDURE — 71045 X-RAY EXAM CHEST 1 VIEW: CPT

## 2019-08-15 PROCEDURE — 80053 COMPREHEN METABOLIC PANEL: CPT | Performed by: INTERNAL MEDICINE

## 2019-08-15 PROCEDURE — 85007 BL SMEAR W/DIFF WBC COUNT: CPT | Performed by: INTERNAL MEDICINE

## 2019-08-15 PROCEDURE — 82570 ASSAY OF URINE CREATININE: CPT | Performed by: INTERNAL MEDICINE

## 2019-08-15 RX ORDER — MIDAZOLAM HYDROCHLORIDE 1 MG/ML
INJECTION INTRAMUSCULAR; INTRAVENOUS
Status: COMPLETED | OUTPATIENT
Start: 2019-08-15 | End: 2019-08-15

## 2019-08-15 RX ORDER — SPIRONOLACTONE 25 MG/1
25 TABLET ORAL DAILY
Status: DISCONTINUED | OUTPATIENT
Start: 2019-08-15 | End: 2019-08-17

## 2019-08-15 RX ORDER — BUMETANIDE 0.25 MG/ML
2 INJECTION INTRAMUSCULAR; INTRAVENOUS EVERY 6 HOURS
Status: COMPLETED | OUTPATIENT
Start: 2019-08-15 | End: 2019-08-16

## 2019-08-15 RX ORDER — BISOPROLOL FUMARATE 5 MG/1
10 TABLET, FILM COATED ORAL
Status: DISCONTINUED | OUTPATIENT
Start: 2019-08-16 | End: 2019-08-19 | Stop reason: HOSPADM

## 2019-08-15 RX ORDER — AMIODARONE HYDROCHLORIDE 200 MG/1
100 TABLET ORAL
Status: DISCONTINUED | OUTPATIENT
Start: 2019-08-16 | End: 2019-08-19 | Stop reason: HOSPADM

## 2019-08-15 RX ORDER — FENTANYL CITRATE 50 UG/ML
INJECTION, SOLUTION INTRAMUSCULAR; INTRAVENOUS
Status: COMPLETED | OUTPATIENT
Start: 2019-08-15 | End: 2019-08-15

## 2019-08-15 RX ADMIN — IPRATROPIUM BROMIDE 0.5 MG: 0.5 SOLUTION RESPIRATORY (INHALATION) at 19:41

## 2019-08-15 RX ADMIN — DOCUSATE SODIUM 100 MG: 100 CAPSULE, LIQUID FILLED ORAL at 21:22

## 2019-08-15 RX ADMIN — CEFTRIAXONE 1 G: 1 INJECTION, SOLUTION INTRAVENOUS at 12:46

## 2019-08-15 RX ADMIN — INSULIN ASPART 4 UNITS: 100 INJECTION, SOLUTION INTRAVENOUS; SUBCUTANEOUS at 18:30

## 2019-08-15 RX ADMIN — TOPICAL ANESTHETIC 1 SPRAY: 200 SPRAY DENTAL; PERIODONTAL at 14:02

## 2019-08-15 RX ADMIN — GUAIFENESIN 600 MG: 600 TABLET, EXTENDED RELEASE ORAL at 09:01

## 2019-08-15 RX ADMIN — APIXABAN 5 MG: 5 TABLET, FILM COATED ORAL at 09:00

## 2019-08-15 RX ADMIN — PANTOPRAZOLE SODIUM 40 MG: 40 TABLET, DELAYED RELEASE ORAL at 09:01

## 2019-08-15 RX ADMIN — FERROUS SULFATE TAB EC 324 MG (65 MG FE EQUIVALENT) 324 MG: 324 (65 FE) TABLET DELAYED RESPONSE at 09:01

## 2019-08-15 RX ADMIN — INSULIN ASPART 4 UNITS: 100 INJECTION, SOLUTION INTRAVENOUS; SUBCUTANEOUS at 06:46

## 2019-08-15 RX ADMIN — AMIODARONE HYDROCHLORIDE 200 MG: 200 TABLET ORAL at 09:00

## 2019-08-15 RX ADMIN — Medication 1 APPLICATION: at 21:29

## 2019-08-15 RX ADMIN — DILTIAZEM HYDROCHLORIDE 240 MG: 240 CAPSULE, COATED, EXTENDED RELEASE ORAL at 08:59

## 2019-08-15 RX ADMIN — MIDAZOLAM HYDROCHLORIDE 1 MG: 1 INJECTION, SOLUTION INTRAMUSCULAR; INTRAVENOUS at 14:02

## 2019-08-15 RX ADMIN — BISOPROLOL FUMARATE 20 MG: 5 TABLET ORAL at 09:00

## 2019-08-15 RX ADMIN — Medication 1 APPLICATION: at 09:02

## 2019-08-15 RX ADMIN — SODIUM CHLORIDE, PRESERVATIVE FREE 3 ML: 5 INJECTION INTRAVENOUS at 10:38

## 2019-08-15 RX ADMIN — POLYETHYLENE GLYCOL 3350 17 G: 17 POWDER, FOR SOLUTION ORAL at 08:59

## 2019-08-15 RX ADMIN — GUAIFENESIN 600 MG: 600 TABLET, EXTENDED RELEASE ORAL at 21:22

## 2019-08-15 RX ADMIN — ATORVASTATIN CALCIUM 10 MG: 10 TABLET, FILM COATED ORAL at 21:22

## 2019-08-15 RX ADMIN — INSULIN ASPART 6 UNITS: 100 INJECTION, SOLUTION INTRAVENOUS; SUBCUTANEOUS at 12:45

## 2019-08-15 RX ADMIN — IPRATROPIUM BROMIDE 0.5 MG: 0.5 SOLUTION RESPIRATORY (INHALATION) at 07:03

## 2019-08-15 RX ADMIN — LIDOCAINE 1 PATCH: 50 PATCH CUTANEOUS at 18:30

## 2019-08-15 RX ADMIN — DOCUSATE SODIUM 100 MG: 100 CAPSULE, LIQUID FILLED ORAL at 08:59

## 2019-08-15 RX ADMIN — LEVOTHYROXINE SODIUM 150 MCG: 150 TABLET ORAL at 06:11

## 2019-08-15 RX ADMIN — OXYCODONE HYDROCHLORIDE AND ACETAMINOPHEN 500 MG: 500 TABLET ORAL at 09:00

## 2019-08-15 RX ADMIN — Medication 220 MG: at 09:00

## 2019-08-15 RX ADMIN — TOPICAL ANESTHETIC 1 SPRAY: 200 SPRAY DENTAL; PERIODONTAL at 13:39

## 2019-08-15 RX ADMIN — TOPICAL ANESTHETIC 1 SPRAY: 200 SPRAY DENTAL; PERIODONTAL at 13:33

## 2019-08-15 RX ADMIN — ISOSORBIDE MONONITRATE 15 MG: 30 TABLET, EXTENDED RELEASE ORAL at 10:37

## 2019-08-15 RX ADMIN — APIXABAN 5 MG: 5 TABLET, FILM COATED ORAL at 21:22

## 2019-08-15 RX ADMIN — FERROUS SULFATE TAB EC 324 MG (65 MG FE EQUIVALENT) 324 MG: 324 (65 FE) TABLET DELAYED RESPONSE at 18:30

## 2019-08-15 RX ADMIN — BUMETANIDE 2 MG: 0.25 INJECTION INTRAMUSCULAR; INTRAVENOUS at 13:08

## 2019-08-15 RX ADMIN — MULTIPLE VITAMINS W/ MINERALS TAB 1 TABLET: TAB at 09:01

## 2019-08-15 RX ADMIN — BUMETANIDE 2 MG: 0.25 INJECTION INTRAMUSCULAR; INTRAVENOUS at 18:30

## 2019-08-15 RX ADMIN — SODIUM CHLORIDE, PRESERVATIVE FREE 3 ML: 5 INJECTION INTRAVENOUS at 21:24

## 2019-08-15 RX ADMIN — Medication 220 MG: at 21:22

## 2019-08-15 RX ADMIN — Medication 250 MG: at 21:22

## 2019-08-15 RX ADMIN — Medication 250 MG: at 08:59

## 2019-08-15 RX ADMIN — METOPROLOL TARTRATE 5 MG: 1 INJECTION, SOLUTION INTRAVENOUS at 09:01

## 2019-08-15 RX ADMIN — FENTANYL CITRATE 25 MCG: 50 INJECTION, SOLUTION INTRAMUSCULAR; INTRAVENOUS at 14:02

## 2019-08-15 RX ADMIN — ASPIRIN 81 MG 81 MG: 81 TABLET ORAL at 09:00

## 2019-08-15 RX ADMIN — INSULIN ASPART 2 UNITS: 100 INJECTION, SOLUTION INTRAVENOUS; SUBCUTANEOUS at 21:25

## 2019-08-15 RX ADMIN — SPIRONOLACTONE 25 MG: 25 TABLET ORAL at 18:30

## 2019-08-15 NOTE — PROGRESS NOTES
ON CALL HOSPITALIST NOTE:    Dr Aceves had been contacted earlier and okay with patient transfer out of ICU. She has some intermittent flutter waves but converted to sinus overnight. Continue telemetry but transfer out of ICU setting at this time. She will remain in ICU at this time, this morning, as telemetry overflow patient while monitoring telemetry closely. Discussed with house supervisor.

## 2019-08-15 NOTE — PROGRESS NOTES
[facility]    Name:  Millicent cMkeon   Age:  59 y.o.  Sex:  female  :  1960  MRN:  [id number]   Visit Number:  [id number]  Admission Date:  2019  Date Of Service:  08/15/19  Primary Care Physician:  Marianela Albright APRN     LOS: 1 day :  Patient Care Team:  Marianela Albright APRN as PCP - General (Family Medicine)  Geremias Shepherd MD as Consulting Physician (General Surgery)  Lisa Cardoso MD as Surgeon (General Surgery):    History taken from:     patient    Chief Complaint:      Arrhythmia    Subjective:    Interval History:     Patient seen and examined again today.    Patient is a 59-year-old  female with history of a flutter, essential hypertension, morbid obesity, hypothyroidism, diabetes mellitus type 2 who is transferred from [facility] at the request of Dr. Aceves due to persistent a flutter.  She has been tried on diltiazem, beta-blocker, dig, amiodarone without relief.  She is on Eliquis.   She is asymptomatic with this.  She does have mild congestive heart failure on her chest x-ray.  Her breathing did see the patient and noted she had converted, so decided against SACHIN with cardioversion at this point.     Patient states she has had a productive cough with yellow sputum, decreased appetite, and fever and chills.  She has been at a nursing home in the past 6 months, and respiratory symptoms are why she was sent in to the hospital.  Her troponin was negative over there.  They have been negative here as well. WBC count was actually 3.9.  She was placed on Rocephin, she states she feels better.  She also follows with Dr. Leone, her creatinine is 1.85 today.    He is following at present.     She currently denies any pain.  She denies any chest pressure, shortness of breath, nausea or vomiting.  Her cough is improved.  She has not ambulated for at least 6 months.  She has significant peripheral edema of her legs.   Her weight is well over 300 pounds.    She refuses PT and OT evaluation here.  Reviewed echocardiogram from 1/2019 which showed preserved ejection fraction with pulmonary hypertension.            Review of Systems:     All systems were reviewed and negative except for:  Musculoskeletal: positive for  muscle weakness    Objective:    Vital Signs:    Temp:  [97.7 °F (36.5 °C)-98.6 °F (37 °C)] 98.2 °F (36.8 °C)  Heart Rate:  [] 109  Resp:  [17-24] 18  BP: (114-176)/() 134/76    Physical Exam:      General Appearance:    Alert, cooperative, in no acute distress   Head:    Normocephalic, without obvious abnormality, atraumatic   Eyes:            Lids and lashes normal, conjunctivae and sclerae normal, no   icterus, no pallor, corneas clear, PERRLA   Ears:    Ears appear intact with no abnormalities noted   Throat:   No oral lesions, no thrush, oral mucosa moist   Neck:   No adenopathy, supple, trachea midline, no thyromegaly, no     carotid bruit, no JVD   Back:     No kyphosis present, no scoliosis present, no skin lesions,       erythema or scars, no tenderness to percussion or                   palpation,   range of motion normal   Lungs:     Clear to auscultation,respirations regular, even and                   unlabored    Heart:   Irregular rhythm and normal rate,  no   murmur, no gallop, no rub, no click   Breast Exam:    Deferred   Abdomen:     Normal bowel sounds, no masses, no organomegaly, soft        non-tender, non-distended, no guarding, no rebound                 tenderness   Genitalia:    Deferred   Extremities:   Moves all extremities well, no edema, no cyanosis, no              redness   Pulses:   Pulses palpable and equal bilaterally   Skin:   No bleeding, bruising or rash   Lymph nodes:   No palpable adenopathy   Neurologic:   Cranial nerves 2 - 12 grossly intact, sensation intact, DTR        present and equal bilaterally        Results Review:      I reviewed the patient's new clinical  results.    Labs:    Lab Results (last 24 hours)     Procedure Component Value Units Date/Time    VRE Culture - Swab, Per Rectum [[id number]]  (Normal) Collected:  08/14/19 1636    Specimen:  Swab from Per Rectum Updated:  08/15/19 1143     VRE SCREEN CX No Vancomycin Resistant Enterococcus Isolated    Acinetobacter Screen - Swab, Arm, Left [[id number]]  (Normal) Collected:  08/14/19 1636    Specimen:  Swab from Arm, Left Updated:  08/15/19 1142     ACINETOBACTER SCREEN CX No Acinetobacter isolated    MRSA Screen Culture - Swab, Nares [[id number]]  (Normal) Collected:  08/14/19 1636    Specimen:  Swab from Nares Updated:  08/15/19 1142     MRSA SCREEN CX No Methicillin Resistant Staphylococcus aureus isolated    POC Glucose Once [[id number]]  (Abnormal) Collected:  08/15/19 1047    Specimen:  Blood Updated:  08/15/19 1055     Glucose 250 mg/dL      Comment: Serial Number: [id number]:  [id number]       Respiratory Culture - Sputum, Cough [[id number]] Collected:  08/15/19 0725    Specimen:  Sputum from Cough Updated:  08/15/19 0821     Respiratory Culture Rejected    Narrative:       Specimen rejected due to microscopic exam of gram stain.    POC Glucose Once [[id number]]  (Abnormal) Collected:  08/15/19 0615    Specimen:  Blood Updated:  08/15/19 0621     Glucose 212 mg/dL      Comment: Serial Number: [id number]:  [id number]       Troponin [[id number]]  (Normal) Collected:  08/15/19 0454    Specimen:  Blood Updated:  08/15/19 0609     Troponin T <0.010 ng/mL     Narrative:       Troponin T Reference Range:  <= 0.03 ng/mL-   Negative for AMI  >0.03 ng/mL-     Abnormal for myocardial necrosis.  Clinicians would have to utilize clinical acumen, EKG, Troponin and serial changes to determine if it is an Acute Myocardial Infarction or myocardial injury due to an underlying chronic condition.     TSH [[id number]]  (Normal) Collected:  08/15/19 0454    Specimen:  Blood Updated:  08/15/19 0609     TSH 4.200 mIU/mL      Comprehensive Metabolic Panel [[id number]]  (Abnormal) Collected:  08/15/19 0454    Specimen:  Blood Updated:  08/15/19 0607     Glucose 238 mg/dL      Comment: Glucose >180, Hemoglobin A1C recommended.        BUN 30 mg/dL      Creatinine 1.85 mg/dL      Sodium 141 mmol/L      Potassium 4.5 mmol/L      Chloride 106 mmol/L      CO2 23.3 mmol/L      Calcium 9.0 mg/dL      Total Protein 6.7 g/dL      Albumin 3.60 g/dL      ALT (SGPT) 11 U/L      AST (SGOT) 15 U/L      Alkaline Phosphatase 152 U/L      Total Bilirubin 0.3 mg/dL      eGFR Non African Amer 28 mL/min/1.73      eGFR  African Amer 34 mL/min/1.73      Globulin 3.1 gm/dL      A/G Ratio 1.2 g/dL      BUN/Creatinine Ratio 16.2     Anion Gap 11.7 mmol/L     Narrative:       GFR Normal >60  Chronic Kidney Disease <60  Kidney Failure <15    CBC Auto Differential [[id number]]  (Abnormal) Collected:  08/15/19 0454    Specimen:  Blood Updated:  08/15/19 0605     WBC 4.60 10*3/mm3      RBC 3.97 10*6/mm3      Hemoglobin 9.7 g/dL      Hematocrit 32.2 %      MCV 81.1 fL      MCH 24.4 pg      MCHC 30.1 g/dL      RDW 20.4 %      RDW-SD 59.8 fl      MPV 10.2 fL      Platelets 134 10*3/mm3      Neutrophil % 70.5 %      Lymphocyte % 17.2 %      Monocyte % 11.5 %      Eosinophil % 0.2 %      Basophil % 0.4 %      Immature Grans % 0.2 %      Neutrophils, Absolute 3.24 10*3/mm3      Lymphocytes, Absolute 0.79 10*3/mm3      Monocytes, Absolute 0.53 10*3/mm3      Eosinophils, Absolute 0.01 10*3/mm3      Basophils, Absolute 0.02 10*3/mm3      Immature Grans, Absolute 0.01 10*3/mm3      nRBC 0.0 /100 WBC     Scan Slide [[id number]] Collected:  08/15/19 0454    Specimen:  Blood Updated:  08/15/19 0605     Anisocytosis Mod/2+     Hypochromia Slight/1+     Poikilocytes Slight/1+     WBC Morphology Normal     Platelet Estimate Adequate    POC Glucose Once [[id number]]  (Abnormal) Collected:  08/15/19 0001    Specimen:  Blood Updated:  08/15/19 0015     Glucose 246 mg/dL      Comment: Serial  Number: [id number]:  [id number]       Troponin [[id number]]  (Normal) Collected:  08/14/19 2317    Specimen:  Blood Updated:  08/14/19 2346     Troponin T <0.010 ng/mL     Narrative:       Troponin T Reference Range:  <= 0.03 ng/mL-   Negative for AMI  >0.03 ng/mL-     Abnormal for myocardial necrosis.  Clinicians would have to utilize clinical acumen, EKG, Troponin and serial changes to determine if it is an Acute Myocardial Infarction or myocardial injury due to an underlying chronic condition.     POC Glucose Once [[id number]]  (Abnormal) Collected:  08/14/19 2023    Specimen:  Blood Updated:  08/14/19 2036     Glucose 420 mg/dL      Comment: Serial Number: [id number]:  [id number]       Troponin [[id number]]  (Normal) Collected:  08/14/19 1743    Specimen:  Blood from Arm, Right Updated:  08/14/19 1829     Troponin T <0.010 ng/mL     Narrative:       Troponin T Reference Range:  <= 0.03 ng/mL-   Negative for AMI  >0.03 ng/mL-     Abnormal for myocardial necrosis.  Clinicians would have to utilize clinical acumen, EKG, Troponin and serial changes to determine if it is an Acute Myocardial Infarction or myocardial injury due to an underlying chronic condition.     Procalcitonin [[id number]]  (Normal) Collected:  08/14/19 1743    Specimen:  Blood from Arm, Right Updated:  08/14/19 1829     Procalcitonin 0.16 ng/mL     Narrative:       As a Marker for Sepsis (Non-Neonates):   1. <0.5 ng/mL represents a low risk of severe sepsis and/or septic shock.  2. >2 ng/mL represents a high risk of severe sepsis and/or septic shock.    As a Marker for Lower Respiratory Tract Infections that require antibiotic therapy:    PCT on Admission     Antibiotic Therapy       6-12 Hrs later  > 0.5                Strongly Recommended             >0.25 - <0.5         Recommended  0.1 - 0.25           Discouraged              Remeasure/reassess PCT  <0.1                 Strongly Discouraged     Remeasure/reassess PCT                      PCT values of < 0.5 ng/mL do not exclude an infection, because localized infections (without systemic signs) may be associated with such low concentrations, or a systemic infection in its initial stages (< 6 hours). Furthermore, increased PCT can occur without infection. PCT concentrations between 0.5 and 2.0 ng/mL should be interpreted taking into account the patient's history. It is recommended to retest PCT within 6-24 hours if any concentrations < 2 ng/mL are obtained.           Radiology:    Imaging Results (last 24 hours)     Procedure Component Value Units Date/Time    XR Chest 1 View [[id number]] Collected:  08/15/19 0906     Updated:  08/15/19 0909    Narrative:       PROCEDURE: XR CHEST 1 VW-     HISTORY: DYSPNEA CHRONIC, NO XRAY     COMPARISON: 01/14/2019.     FINDINGS: The heart is enlarged. The mediastinum is unremarkable. There  are linear opacities in the right midlung field and right lung base  likely atelectasis. There is no pneumothorax.  There are no acute  osseous abnormalities.       Impression:       Linear opacities in the right midlung field and right lung  base likely atelectasis.     Continued followup is recommended.     This report was finalized on 8/15/2019 9:06 AM by Lety Valiente M.D..          Medication Review:       amiodarone 200 mg Oral BID With Meals   ammonium lactate 1 application Topical BID   apixaban 5 mg Oral Q12H   aspirin 81 mg Oral Daily   atorvastatin 10 mg Oral Nightly   bisoprolol 20 mg Oral Q24H   ceftriaxone 1 g Intravenous Q24H   diltiaZEM  mg Oral Q24H   docusate sodium 100 mg Oral BID   ferrous sulfate 324 mg Oral BID With Meals   guaiFENesin 600 mg Oral BID   insulin aspart 0-9 Units Subcutaneous 4x Daily AC & at Bedtime   ipratropium 0.5 mg Nebulization 4x Daily - RT   isosorbide mononitrate 15 mg Oral Daily   levothyroxine 150 mcg Oral Q AM   lidocaine 1 patch Transdermal Q24H   multivitamin with minerals 1 tablet Oral Daily   pantoprazole 40 mg  Oral Daily   polyethylene glycol 17 g Oral BID   saccharomyces boulardii 250 mg Oral BID   sodium chloride 3 mL Intravenous Q12H   ascorbic acid 500 mg Oral Daily   zinc sulfate 220 mg Oral BID            Assessment:    Problem List Items Addressed This Visit     None          1.    Paroxysmal a flutter, currently rate controlled  2.  Acute bronchitis, present on admission, on Rocephin  3.  Mild CHF, diastolic  4.  Morbid obesity with pickwickian syndrome  5.  Pulmonary hypertension  6.  Chronic hypoxic respiratory failure on O2  7.  Diabetes mellitus type 2  8.  Essential hypertension  9.  Hypothyroidism  10.  Chronic thrombocytopenia  11.  Leukopenia  12.  Chronic kidney disease stage III  13.  Significant peripheral edema.        Plan:    Patient has been moved out of the intensive care unit.  She had converted to sinus at one point, she is currently in a flutter with rate controlled.  Continue with Rocephin at the present time.  Dr. Aceves is not going to cardiovert at this time, will continue to monitor.  Continue with Eliquis for DVT and stroke prophylaxis.  Dr. Leone is doing aggressive diuresis at this point given her significant edema.  If patient is stable by tomorrow could be moved back to [facility] and rehab.  Check lab work in the a.m.  Further recommendations will depend on the clinical course.    Elmer Lopez,   08/15/19  11:57 AM

## 2019-08-15 NOTE — PLAN OF CARE
Problem: Patient Care Overview  Goal: Plan of Care Review  Outcome: Ongoing (interventions implemented as appropriate)   08/15/19 4463   Coping/Psychosocial   Plan of Care Reviewed With patient   Plan of Care Review   Progress improving   OTHER   Outcome Summary SACHIN + cardioversion successful. SB att. SACHIN negative. A+O x 4. Patient almost total care as she requires max assist to roll in bed and does not get up. Refuses PT.

## 2019-08-15 NOTE — PLAN OF CARE
Problem: Patient Care Overview  Goal: Plan of Care Review  Outcome: Ongoing (interventions implemented as appropriate)      Problem: Fall Risk (Adult)  Goal: Identify Related Risk Factors and Signs and Symptoms  Outcome: Outcome(s) achieved Date Met: 08/15/19    Goal: Absence of Fall  Outcome: Ongoing (interventions implemented as appropriate)   08/15/19 0525   Fall Risk (Adult)   Absence of Fall making progress toward outcome  (ON BEDREST)       Problem: Skin Injury Risk (Adult)  Goal: Identify Related Risk Factors and Signs and Symptoms  Outcome: Outcome(s) achieved Date Met: 08/15/19    Goal: Skin Health and Integrity  Outcome: Ongoing (interventions implemented as appropriate)   08/15/19 0525   Skin Injury Risk (Adult)   Skin Health and Integrity other (see comments)  (SKIN CARE EACH SHIFT)       Problem: Pain, Chronic (Adult)  Goal: Identify Related Risk Factors and Signs and Symptoms  Outcome: Outcome(s) achieved Date Met: 08/15/19    Goal: Acceptable Pain/Comfort Level and Functional Ability  Outcome: Ongoing (interventions implemented as appropriate)      Problem: Arrhythmia/Dysrhythmia (Symptomatic) (Adult)  Goal: Signs and Symptoms of Listed Potential Problems Will be Absent, Minimized or Managed (Arrhythmia/Dysrhythmia)  Outcome: Ongoing (interventions implemented as appropriate)   08/15/19 0525   Goal/Outcome Evaluation   Problems Assessed (Arrhythmia/Dysrhythmia) all   Problems Present (Dysrhythmia) other (see comments)  (OCCASIONAL FLUTTER WAVES)

## 2019-08-15 NOTE — PROGRESS NOTES
Discharge Planning Assessment  [facility]     Patient Name: Millicent Mckeon  MRN: [id number]  Today's Date: 8/15/2019    Admit Date: 8/14/2019    Discharge Needs Assessment     Row Name 08/15/19 1300       Living Environment    Lives With  facility resident    Name(s) of Who Lives With Patient  [facility]    Current Living Arrangements  extended care facility    Primary Care Provided by  other (see comments)    Provides Primary Care For  no one, unable/limited ability to care for self    Family Caregiver if Needed  sibling(s);other relative(s)    Quality of Family Relationships  helpful;involved;supportive    Able to Return to Prior Arrangements  yes       Resource/Environmental Concerns    Resource/Environmental Concerns  none       Transition Planning    Patient/Family Anticipates Transition to  long term care facility    Transportation Anticipated  health plan transportation       Discharge Needs Assessment    Readmission Within the Last 30 Days  no previous admission in last 30 days    Concerns to be Addressed  no discharge needs identified;denies needs/concerns at this time    Equipment Currently Used at Home  hospital bed;lift device;wheelchair    Equipment Needed After Discharge  none    Discharge Facility/Level of Care Needs  nursing facility, intermediate    Patient's Choice of Community Agency(s)  Return to [place]        Discharge Plan     Row Name 08/15/19 1302       Plan    Plan Comments  Pt is a long-term care resident at [facility] and [facility]. Spoke with Carla, pt may come back. Pt requires lift device, w/c, hospital bed. Pt has POA, Lovely Mckeon, which is her sister. Pt also has a brother, Adolfo and a Niece, Val who is involved and supportive, they visit pt in nursing home regularly. No needs at this time. Pt will need EMS transportation back to [place] when ready for discharge. SW/CM following for discharge planning/assistance.         Destination      No service  coordination in this encounter.      Durable Medical Equipment      No service coordination in this encounter.      Dialysis/Infusion      No service coordination in this encounter.      Home Medical Care      No service coordination in this encounter.      Therapy      No service coordination in this encounter.      Community Resources      No service coordination in this encounter.          Demographic Summary     Row Name 08/15/19 1111       General Information    Admission Type  inpatient    Arrived From  emergency department Hortensia Banerjee        Functional Status     Row Name 08/15/19 1259       Functional Status    Usual Activity Tolerance  poor    Current Activity Tolerance  poor       Functional Status, IADL    Medications  completely dependent    Meal Preparation  completely dependent    Housekeeping  completely dependent    Laundry  completely dependent    Shopping  completely dependent        Psychosocial     Row Name 08/15/19 1259       Intellectual Performance WDL    Level of Consciousness  Alert       Coping/Stress    Patient Personal Strengths  strong support system    Sources of Support  sibling(s);other family members       Developmental Stage (Eriksson's)    Developmental Stage  Stage 7 (35-65 years/Middle Adulthood) Generativity vs. Stagnation       C-SSRS (Recent)    Wish to be Dead  no    Suicidal Thoughts  no    Suicide Behavior  no       Violence Risk    Feels Like Hurting Others  no    Previous Attempt to Harm Others  no        Abuse/Neglect     Row Name 08/15/19 1300       Personal Safety    Feels Unsafe at Home or Work/School  no    Feels Threatened by Someone  no    Does Anyone Try to Keep You From Having Contact with Others or Doing Things Outside Your Home?  no    Physical Signs of Abuse Present  no        Legal    No documentation.       Substance Abuse    No documentation.       Patient Forms    No documentation.           HERB Naik  1:26 PM  08/15/19

## 2019-08-15 NOTE — PROGRESS NOTES
Adult Nutrition  Assessment/PES    Patient Name:  Millicetn Mckeon  YOB: 1960  MRN: [id number]  Admit Date:  8/14/2019    Assessment Date:  8/15/2019    Comments:  Rec #1: Continue current diet order; Establishing and encouraging intake. Rec #2: Continue MVI with minerals daily. RD to follow pt. Consult RD PRN.       Reason for Assessment     Row Name 08/15/19 0953          Reason for Assessment    Reason For Assessment  diagnosis/disease state;identified at risk by screening criteria     Diagnosis  diabetes diagnosis/complications;cardiac disease DM2, CHF, Pulm. HTN, A-Fib     Identified At Risk by Screening Criteria  BMI             Labs/Tests/Procedures/Meds     Row Name 08/15/19 0954          Labs/Procedures/Meds    Lab Results Reviewed  reviewed, pertinent        Medications    Pertinent Medications Reviewed  reviewed     Pertinent Medications Comments  High: Glu, BUn, Creat   Low: Platelets           Estimated/Assessed Needs     Row Name 08/15/19 0967          Calculation Measurements    Weight Used For Calculations  61.9 kg (136 lb 6 oz) IBW        Estimated/Assessed Needs    Additional Documentation  Fluid Requirements (Group);Charlton-St. Jeor Equation (Group);Protein Requirements (Group);Calorie Requirements (Group)        Calorie Requirements    Weight Used For Calorie Calculations  -- AF 1.2     Estimated Calorie Need Method  Charlton-St Jeor     Estimated Calorie Requirement Comment  ~2186-4122        Charlton-St. Jeor Equation    RMR (Charlton-St. Jeor Equation)  1216.225        Protein Requirements    Est Protein Requirement Amount (gms/kg)  1.2 gm protein ~62-74 gm     Estimated Protein Requirements (gms/day)  74.23        Fluid Requirements    Estimated Fluid Requirement Method  Paco-Segar Formula     Paco-Segar Method (over 20 kg)  2737.2         Nutrition Prescription Ordered     Row Name 08/15/19 0956          Nutrition Prescription PO    Current PO Diet  Regular     Common  Modifiers  Cardiac;Consistent Carbohydrate         Evaluation of Received Nutrient/Fluid Intake     Row Name 08/15/19 0955          Calculation Measurements    Weight Used For Calculations  61.9 kg (136 lb 6 oz) IBW        PO Evaluation    Number of Days PO Intake Evaluated  Insufficient Data         Evaluation of Prescribed Nutrient/Fluid Intake     Row Name 08/15/19 0955          Calculation Measurements    Weight Used For Calculations  61.9 kg (136 lb 6 oz) IBW             Problem/Interventions:  Problem 1     Row Name 08/15/19 0957          Nutrition Diagnoses Problem 1    Problem 1  Overweight/Obesity     Etiology (related to)  Factors Affecting Nutrition     Food Habit/Preferences  Large Meals     Signs/Symptoms (evidenced by)  BMI     BMI  Greater than 40                 Intervention Goal     Row Name 08/15/19 0957          Intervention Goal    General  Meet nutritional needs for age/condition     PO  Meet estimated needs;Establish PO     Weight  No significant weight loss         Nutrition Intervention     Row Name 08/15/19 0957          Nutrition Intervention    RD/Tech Action  Follow Tx progress;Care plan reviewd         Nutrition Prescription     Row Name 08/15/19 0957          Nutrition Prescription PO    PO Prescription  Other (comment) Continue current diet order     New PO Prescription Ordered?  No, recommended        Other Orders    Obtain Weight  Daily     Obtain Weight Ordered?  No, recommended     Supplement Continue Vitamin mineral supplement     Supplement Ordered?  No, recommended         Education/Evaluation     Row Name 08/15/19 0958          Education    Education  Will Instruct as appropriate        Monitor/Evaluation    Monitor  Per protocol;I&O;PO intake;Pertinent labs;Weight           Electronically signed by:  Starla William RD  08/15/19 9:59 AM

## 2019-08-15 NOTE — PROGRESS NOTES
"G-Cardiology Progress note     LOS: 1 day   Patient Care Team:  Marianela Albright APRN as PCP - General (Family Medicine)  Geremias Shepherd MD as Consulting Physician (General Surgery)  Lisa Cardoso MD as Surgeon (General Surgery)    Chief Complaint: Shortness of breath    Subjective:    Interval History:     Patient Complaints: none  Patient Denies:   Chest pain, shortness of breath, orthopnea, peripheral edema, palpitations, cough, sputum production, hemoptysis, abdominal pain, nausea, vomiting, diarrhea, fevers chills or night sweats  History taken from: patient RN    Review of Systems:   All systems were reviewed and negative       Objective:    Vital Sign Min/Max for last 24 hours  Temp  Min: 97.7 °F (36.5 °C)  Max: 98.6 °F (37 °C)   BP  Min: 114/83  Max: 176/119   Pulse  Min: 93  Max: 121   Resp  Min: 17  Max: 24   SpO2  Min: 87 %  Max: 96 %   No Data Recorded   Weight  Min: 143 kg (315 lb 3.2 oz)  Max: 144 kg (316 lb 9.6 oz)     Flowsheet Rows      First Filed Value   Admission Height  170.2 cm (67\") Documented at 08/14/2019 1725   Admission Weight  144 kg (316 lb 9.6 oz)  (Abnormal)  Documented at 08/14/2019 1659          Physical Exam:     General Appearance:    Alert, cooperative, in no acute distress   Head:    Normocephalic, without obvious abnormality, atraumatic   Eyes:            Lids and lashes normal, conjunctivae and sclerae normal, no   icterus, no pallor, corneas clear, PERRLA   Ears:    Ears appear intact with no abnormalities noted   Throat:   No oral lesions, no thrush, oral mucosa moist   Neck:   No adenopathy, supple, trachea midline, no thyromegaly, no     carotid bruit, no JVD   Back:     No kyphosis present, no scoliosis present, no skin lesions,       erythema or scars, no tenderness to percussion or                   palpation,   range of motion normal   Lungs:     Clear to auscultation,respirations regular, even and                   unlabored    Heart:    Regular rhythm and normal " rate, normal S1 and S2, no            murmur, no gallop, no rub, no click   Breast Exam:    Deferred   Abdomen:     Normal bowel sounds, no masses, no organomegaly, soft        non-tender, non-distended, no guarding, no rebound                 tenderness   Genitalia:    Deferred   Extremities:   Moves all extremities well, (+) edema, no cyanosis, no              redness   Pulses:   Pulses palpable and equal bilaterally   Skin:   No bleeding, bruising or rash   Lymph nodes:   No palpable adenopathy   Neurologic:   Cranial nerves 2 - 12 grossly intact, sensation intact, DTR        present and equal bilaterally        Results Review:     I reviewed the patient's new clinical results.      Results from last 7 days   Lab Units 08/15/19  0454   SODIUM mmol/L 141   POTASSIUM mmol/L 4.5   CHLORIDE mmol/L 106   CO2 mmol/L 23.3   BUN mg/dL 30*   CREATININE mg/dL 1.85*   GLUCOSE mg/dL 238*   CALCIUM mg/dL 9.0     Results from last 7 days   Lab Units 08/15/19  0454 08/13/19  1745   WBC 10*3/mm3 4.60 3.9*   HEMOGLOBIN g/dL 9.7* 10.8*   HEMATOCRIT % 32.2* 36.1*   PLATELETS 10*3/mm3 134* 131*         Physical Exam    Medication Review: yes    Assessment/Plan:      Hypothyroidism    Diabetes mellitus (CMS/[facility])    Anemia due to stage 4 chronic kidney disease (CMS/[facility])    Chronic diastolic congestive heart failure (CMS/[facility])    Paroxysmal atrial fibrillation (CMS/[facility])    Cor pulmonale, chronic (CMS/[facility])    Lymphedema of both lower extremities    Morbid obesity with BMI of 45.0-49.9, adult (CMS/[facility])    A-fib (CMS/[facility])    CKD (chronic kidney disease) stage 3, GFR 30-59 ml/min (CMS/[facility])    Acute bronchitis due to other specified organisms      With further heart rate control, it is clear that the patient continues in a typical type I atrial flutter.  I have recommended to proceed with SACHIN guided synchronized external cardioversion later this afternoon.  Further recommendations will be predicated on the results of that  testing.        Kevin Aceves MD  08/15/19  11:34 AM

## 2019-08-15 NOTE — CONSULTS
[facility]      Nephrology Consultation    Referring Provider:   Elmer Lopez DO    Reason for Consultation:  Acute Kidney Injury and associated problems.  CKD stage 4  Subjective:  Chief complaint No chief complaint on file.    History of present illness:    Patient is a 58 yo white female with multiple medical problems as listed below in the past medical history who was transferred from [facility] with persistent atrial flutter. For full details on admission please see the H+P from Dr. Lopez which was reviewed by me. Patient is well known to me due to advanced CKD with noted elevated creatinine upon admission and fluid overload noted on CXR. I was consulted for further medical management of CKD and related issues.  She has pretty much functional quadriplegia, she said she cannot get out of bed or walk any distance.  I have reviewed labs/imaging/records from this hospitalization, including ER staff and admitting/attending physicians H/P's and progress notes to establish a comprehensive understanding of this patient's clinical hospital course, as well as to establish plan of care appropriately.   Past Medical History:   Diagnosis Date   • A-fib (CMS/[facility])    • Anemia 10/2/2018   • Diabetes mellitus (CMS/[facility])    • Disease of thyroid gland    • GERD (gastroesophageal reflux disease)    • Gout    • History of transfusion    • Hypertension        Past Surgical History:   Procedure Laterality Date   • EYE SURGERY     • INCISION AND DRAINAGE LEG Right 1/14/2019    Procedure: Right heel incision and drainage with graft application;  Surgeon: Ar Rueda DPM;  Location: [facility];  Service: Podiatry   • LEG SURGERY         Family History   Problem Relation Age of Onset   • Asthma Mother    • Hypertension Father    • Stroke Father      positive h/o ESRD sister is on dialysis.    Social History     Tobacco Use   • Smoking status: Never Smoker   • Smokeless tobacco: Never Used   Substance Use  Topics   • Alcohol use: No     Frequency: Never   • Drug use: No       Home medications:   Prior to Admission Medications     Prescriptions Last Dose Informant Patient Reported? Taking?    acetaminophen (TYLENOL) 325 MG tablet Past Week  Yes Yes    Take 650 mg by mouth Every 4 (Four) Hours As Needed for Mild Pain .    ammonium lactate (AMLACTIN) 12 % cream 8/13/2019  Yes Yes    Apply 1 application topically to the appropriate area as directed 2 (Two) Times a Day.    apixaban (ELIQUIS) 5 MG tablet tablet 8/13/2019  Yes Yes    Take 5 mg by mouth 2 (Two) Times a Day.    arginine 500 MG tablet 8/14/2019  Yes Yes    Take 500 mg by mouth Daily.    aspirin 81 MG chewable tablet 8/14/2019  Yes Yes    Chew 81 mg Daily.    bisoprolol (ZEBeta) 10 MG tablet 8/13/2019  Yes Yes    5 mg 2 (Two) Times a Day.    diltiaZEM (CARDIZEM) 120 MG tablet 8/14/2019  Yes Yes    Daily.    docusate sodium (COLACE) 100 MG capsule 8/13/2019  Yes Yes    Take 100 mg by mouth 2 (Two) Times a Day.    Elastic Bandages & Supports (JOBST OPAQUE KNEE 20-30MMHG XL) misc Past Week  No Yes    1 application Daily.    ferrous sulfate 324 (65 Fe) MG tablet delayed-release EC tablet 8/14/2019  Yes Yes    Take 324 mg by mouth 2 (Two) Times a Day With Meals.    insulin aspart (novoLOG) 100 UNIT/ML injection 8/14/2019  Yes Yes    Inject  under the skin into the appropriate area as directed 2 (Two) Times a Day. Sliding scale, low dose     isosorbide mononitrate (ISMO,MONOKET) 10 MG tablet 8/14/2019  Yes Yes    Take 15 mg by mouth Daily.    levothyroxine (SYNTHROID, LEVOTHROID) 150 MCG tablet 8/14/2019  Yes Yes    Take 150 mcg by mouth Daily.    lidocaine (LIDODERM) 5 % 8/13/2019  Yes Yes    Place 1 patch on the skin as directed by provider Daily. Apply patch to left knee daily, on at 0700 am off at 2000    Multiple Vitamins-Minerals (MULTIVITAMIN WITH MINERALS) tablet tablet 8/14/2019  Yes Yes    Take 1 tablet by mouth Daily.    nitroglycerin (NITROSTAT) 0.4 MG SL  tablet Past Month  Yes Yes    Place 0.4 mg under the tongue Every 5 (Five) Minutes As Needed for Chest Pain. Take no more than 3 doses in 15 minutes.    Nutritional Supplements (PROTEIN SUPPLEMENT 80% PO) 8/13/2019  Yes Yes    Take 30 mL by mouth 2 (Two) Times a Day.    pantoprazole (PROTONIX) 40 MG EC tablet 8/14/2019  Yes Yes    Take 40 mg by mouth Daily.    polyethylene glycol (MIRALAX) packet 8/14/2019  Yes Yes    Take 17 g by mouth 2 (Two) Times a Day.    RA VITAMIN C 500 MG tablet 8/14/2019  Yes Yes    Take 500 mg by mouth Daily.    saccharomyces boulardii (FLORASTOR) 250 MG capsule 8/14/2019  Yes Yes    Take 250 mg by mouth 2 (Two) Times a Day.    simvastatin (ZOCOR) 20 MG tablet 8/14/2019  Yes Yes    Take 20 mg by mouth Every Night.    Zinc Sulfate 220 (50 Zn) MG tablet 8/14/2019  Yes Yes    Take 1 tablet by mouth 2 (Two) Times a Day.          Emergency department medications: Medications   acetaminophen (TYLENOL) tablet 650 mg (not administered)   ammonium lactate (AMLACTIN) 12 % cream 1 application (1 application Topical Given 8/15/19 0902)   aspirin chewable tablet 81 mg (81 mg Oral Given 8/15/19 0900)   docusate sodium (COLACE) capsule 100 mg (100 mg Oral Given 8/15/19 0859)   ferrous sulfate EC tablet 324 mg (324 mg Oral Given 8/15/19 0901)   isosorbide mononitrate (IMDUR) 24 hr tablet 15 mg (not administered)   levothyroxine (SYNTHROID, LEVOTHROID) tablet 150 mcg (150 mcg Oral Given 8/15/19 0611)   lidocaine (LIDODERM) 5 % 1 patch (1 patch Transdermal Not Given 8/14/19 1739)   multivitamin with minerals 1 tablet (1 tablet Oral Given 8/15/19 0901)   nitroglycerin (NITROSTAT) SL tablet 0.4 mg (not administered)   pantoprazole (PROTONIX) EC tablet 40 mg (40 mg Oral Given 8/15/19 0901)   polyethylene glycol (MIRALAX) powder 17 g (17 g Oral Given 8/15/19 0859)   vitamin C (ASCORBIC ACID) tablet 500 mg (500 mg Oral Given 8/15/19 0900)   saccharomyces boulardii (FLORASTOR) capsule 250 mg (250 mg Oral Given  8/15/19 0859)   atorvastatin (LIPITOR) tablet 10 mg (10 mg Oral Given 8/14/19 2058)   zinc sulfate (ZINCATE) capsule 220 mg (220 mg Oral Given 8/15/19 0900)   dextrose (GLUTOSE) oral gel 1 tube (not administered)   dextrose (D50W) 25 g/ 50mL Intravenous Solution 25 g (not administered)   glucagon (human recombinant) (GLUCAGEN DIAGNOSTIC) injection 1 mg (not administered)   sodium chloride 0.9 % flush 3 mL (3 mL Intravenous Given 8/14/19 2059)   sodium chloride 0.9 % flush 3-10 mL (not administered)   insulin aspart (novoLOG) injection 0-9 Units (4 Units Subcutaneous Given 8/15/19 0646)   ondansetron (ZOFRAN) injection 4 mg (not administered)   ipratropium (ATROVENT) nebulizer solution 0.5 mg (0.5 mg Nebulization Not Given 8/15/19 0851)   guaiFENesin (MUCINEX) 12 hr tablet 600 mg (600 mg Oral Given 8/15/19 0901)   bisacodyl (DULCOLAX) suppository 10 mg (not administered)   apixaban (ELIQUIS) tablet 5 mg (5 mg Oral Given 8/15/19 0900)   metoprolol tartrate (LOPRESSOR) injection 5 mg (5 mg Intravenous Given 8/15/19 0901)   cefTRIAXone (ROCEPHIN) IVPB 1 g/50ml dextrose (premix) (not administered)   bisoprolol (ZEBeta) tablet 20 mg (20 mg Oral Given 8/15/19 0900)   diltiaZEM CD (CARDIZEM CD) 24 hr capsule 240 mg (240 mg Oral Given 8/15/19 0859)   amiodarone (PACERONE) tablet 200 mg (200 mg Oral Given 8/15/19 0900)   furosemide (LASIX) injection 40 mg (40 mg Intravenous Given 8/14/19 1847)   sodium chloride 3 % nebulizer solution 4 mL (4 mL Nebulization Given 8/14/19 1931)   insulin aspart (novoLOG) injection 12 Units (12 Units Subcutaneous Given 8/14/19 2058)   diltiaZEM SR (CARDIZEM SR) 12 hr capsule 120 mg (120 mg Oral Given 8/14/19 2153)   bisoprolol (ZEBeta) tablet 10 mg (10 mg Oral Given 8/14/19 5719)       Allergies:  Metformin and related    Review of Systems  1. Constitutional: Negative for fever and chills.  Denies any diaphoresis, positive for fatigue and unexpected weight change.   2. HENT: Negative for  "congestion and hearing loss.   3. Eyes: Negative for redness and visual disturbance.   4. Respiratory: Positive for shortness of breath or cough. Negative for chest pain  5. Cardiovascular: Negative for chest pain and chest tightness or palpitations.   6. Gastrointestinal: Negative for abdominal pain or distention, and blood in stool. Denies any Nausea, vomiting, diarrhea or constipation.  7. Endocrine: Negative for heat or cold intolerance.   8. Genitourinary: Negative for difficulty urinating, dysuria and frequency.   9. Musculoskeletal: Positive for arthralgias, back pain.  Denies any myalgias.   10. Skin: Negative for color change, rash and wound.   11. Neurological: Negative for syncope, weakness and headaches.   12. Hematological: Negative for adenopathy. Does not bruise/bleed easily.   13. Psychiatric/Behavioral: Negative for confusion. The patient is not nervous/anxious.     Objective:  Vital Signs  BP (!) 142/102   Pulse 93 Comment: Simultaneous filing. User may be unaware of other data.  Temp 97.7 °F (36.5 °C) (Oral)   Resp 18 Comment: Simultaneous filing. User may be unaware of other data.  Ht 170.2 cm (67\")   Wt (!) 144 kg (316 lb 9.6 oz)   SpO2 94%   BMI 49.59 kg/m²          I/O this shift:  In: -   Out: 175 [Urine:175]    Intake/Output Summary (Last 24 hours) at 8/15/2019 0941  Last data filed at 8/15/2019 0900  Gross per 24 hour   Intake 240 ml   Output 2175 ml   Net -1935 ml       Physical Exam:  General Appearance:   Alert, cooperative, in no acute distress.     Head:   Normocephalic, without obvious abnormality, atraumatic.     Eyes:      Normal, conjunctivae and sclerae, no icterus, no pallor, corneas clear, PERRLA        Throat:   Oral mucosa dry      Neck:  No adenopathy, supple, trachea midline, no thyromegaly, no carotid bruit, no JVD      Back:   No CVA tenderness on Percussion.     Lungs:    Clear to auscultation and fair air movement noted.  She does have some basilar crackles    "   Heart::   Regular rhythm and normal rate, normal S1 and S2.       Abdomen:   Obese. Normal bowel sounds, no masses, no organomegaly, soft non-tender, non-distended, no guarding, no rebound tenderness, she has significant edema of the abdominal wall.     Genital urinary:   No urinary bladder palpable      Extremities:  Moves all extremities, 2-3+ edema, no cyanosis, no redness.     Pulses:  Pulses palpable and equal bilaterally but weak.     Skin:  No bleeding, bruising or rash        Neurologic:  Cranial nerves grossly intact, move all extremities             Lab Results (last 7 days)     Procedure Component Value Units Date/Time    Respiratory Culture - Sputum, Cough [[id number]] Collected:  08/15/19 0725    Specimen:  Sputum from Cough Updated:  08/15/19 0821     Respiratory Culture Rejected    Narrative:       Specimen rejected due to microscopic exam of gram stain.    POC Glucose Once [[id number]]  (Abnormal) Collected:  08/15/19 0615    Specimen:  Blood Updated:  08/15/19 0621     Glucose 212 mg/dL      Comment: Serial Number: [id number]:  [id number]       Troponin [[id number]]  (Normal) Collected:  08/15/19 0454    Specimen:  Blood Updated:  08/15/19 0609     Troponin T <0.010 ng/mL     Narrative:       Troponin T Reference Range:  <= 0.03 ng/mL-   Negative for AMI  >0.03 ng/mL-     Abnormal for myocardial necrosis.  Clinicians would have to utilize clinical acumen, EKG, Troponin and serial changes to determine if it is an Acute Myocardial Infarction or myocardial injury due to an underlying chronic condition.     TSH [[id number]]  (Normal) Collected:  08/15/19 0454    Specimen:  Blood Updated:  08/15/19 0609     TSH 4.200 mIU/mL     Comprehensive Metabolic Panel [[id number]]  (Abnormal) Collected:  08/15/19 0454    Specimen:  Blood Updated:  08/15/19 0607     Glucose 238 mg/dL      Comment: Glucose >180, Hemoglobin A1C recommended.        BUN 30 mg/dL      Creatinine 1.85 mg/dL      Sodium 141 mmol/L       Potassium 4.5 mmol/L      Chloride 106 mmol/L      CO2 23.3 mmol/L      Calcium 9.0 mg/dL      Total Protein 6.7 g/dL      Albumin 3.60 g/dL      ALT (SGPT) 11 U/L      AST (SGOT) 15 U/L      Alkaline Phosphatase 152 U/L      Total Bilirubin 0.3 mg/dL      eGFR Non African Amer 28 mL/min/1.73      eGFR  African Amer 34 mL/min/1.73      Globulin 3.1 gm/dL      A/G Ratio 1.2 g/dL      BUN/Creatinine Ratio 16.2     Anion Gap 11.7 mmol/L     Narrative:       GFR Normal >60  Chronic Kidney Disease <60  Kidney Failure <15    CBC Auto Differential [[id number]]  (Abnormal) Collected:  08/15/19 0454    Specimen:  Blood Updated:  08/15/19 0605     WBC 4.60 10*3/mm3      RBC 3.97 10*6/mm3      Hemoglobin 9.7 g/dL      Hematocrit 32.2 %      MCV 81.1 fL      MCH 24.4 pg      MCHC 30.1 g/dL      RDW 20.4 %      RDW-SD 59.8 fl      MPV 10.2 fL      Platelets 134 10*3/mm3      Neutrophil % 70.5 %      Lymphocyte % 17.2 %      Monocyte % 11.5 %      Eosinophil % 0.2 %      Basophil % 0.4 %      Immature Grans % 0.2 %      Neutrophils, Absolute 3.24 10*3/mm3      Lymphocytes, Absolute 0.79 10*3/mm3      Monocytes, Absolute 0.53 10*3/mm3      Eosinophils, Absolute 0.01 10*3/mm3      Basophils, Absolute 0.02 10*3/mm3      Immature Grans, Absolute 0.01 10*3/mm3      nRBC 0.0 /100 WBC     Scan Slide [[id number]] Collected:  08/15/19 0454    Specimen:  Blood Updated:  08/15/19 0605     Anisocytosis Mod/2+     Hypochromia Slight/1+     Poikilocytes Slight/1+     WBC Morphology Normal     Platelet Estimate Adequate    POC Glucose Once [[id number]]  (Abnormal) Collected:  08/15/19 0001    Specimen:  Blood Updated:  08/15/19 0015     Glucose 246 mg/dL      Comment: Serial Number: [id number]:  [id number]       Troponin [[id number]]  (Normal) Collected:  08/14/19 2317    Specimen:  Blood Updated:  08/14/19 2346     Troponin T <0.010 ng/mL     Narrative:       Troponin T Reference Range:  <= 0.03 ng/mL-   Negative for AMI  >0.03 ng/mL-      Abnormal for myocardial necrosis.  Clinicians would have to utilize clinical acumen, EKG, Troponin and serial changes to determine if it is an Acute Myocardial Infarction or myocardial injury due to an underlying chronic condition.     POC Glucose Once [[id number]]  (Abnormal) Collected:  08/14/19 2023    Specimen:  Blood Updated:  08/14/19 2036     Glucose 420 mg/dL      Comment: Serial Number: [id number]:  [id number]       Troponin [[id number]]  (Normal) Collected:  08/14/19 1743    Specimen:  Blood from Arm, Right Updated:  08/14/19 1829     Troponin T <0.010 ng/mL     Narrative:       Troponin T Reference Range:  <= 0.03 ng/mL-   Negative for AMI  >0.03 ng/mL-     Abnormal for myocardial necrosis.  Clinicians would have to utilize clinical acumen, EKG, Troponin and serial changes to determine if it is an Acute Myocardial Infarction or myocardial injury due to an underlying chronic condition.     Procalcitonin [[id number]]  (Normal) Collected:  08/14/19 1743    Specimen:  Blood from Arm, Right Updated:  08/14/19 1829     Procalcitonin 0.16 ng/mL     Narrative:       As a Marker for Sepsis (Non-Neonates):   1. <0.5 ng/mL represents a low risk of severe sepsis and/or septic shock.  2. >2 ng/mL represents a high risk of severe sepsis and/or septic shock.    As a Marker for Lower Respiratory Tract Infections that require antibiotic therapy:    PCT on Admission     Antibiotic Therapy       6-12 Hrs later  > 0.5                Strongly Recommended             >0.25 - <0.5         Recommended  0.1 - 0.25           Discouraged              Remeasure/reassess PCT  <0.1                 Strongly Discouraged     Remeasure/reassess PCT                     PCT values of < 0.5 ng/mL do not exclude an infection, because localized infections (without systemic signs) may be associated with such low concentrations, or a systemic infection in its initial stages (< 6 hours). Furthermore, increased PCT can occur without infection.  PCT concentrations between 0.5 and 2.0 ng/mL should be interpreted taking into account the patient's history. It is recommended to retest PCT within 6-24 hours if any concentrations < 2 ng/mL are obtained.    VRE Culture - Swab, Per Rectum [[id number]] Collected:  08/14/19 1636    Specimen:  Swab from Per Rectum Updated:  08/14/19 1751    MRSA Screen Culture - Swab, Nares [[id number]] Collected:  08/14/19 1636    Specimen:  Swab from Nares Updated:  08/14/19 1751    Acinetobacter Screen - Swab, Arm, Left [[id number]] Collected:  08/14/19 1636    Specimen:  Swab from Arm, Left Updated:  08/14/19 1751        Imaging Results (last 72 hours)     Procedure Component Value Units Date/Time    XR Chest 1 View [[id number]] Collected:  08/15/19 0906     Updated:  08/15/19 0909    Narrative:       PROCEDURE: XR CHEST 1 VW-     HISTORY: DYSPNEA CHRONIC, NO XRAY     COMPARISON: 01/14/2019.     FINDINGS: The heart is enlarged. The mediastinum is unremarkable. There  are linear opacities in the right midlung field and right lung base  likely atelectasis. There is no pneumothorax.  There are no acute  osseous abnormalities.       Impression:       Linear opacities in the right midlung field and right lung  base likely atelectasis.     Continued followup is recommended.     This report was finalized on 8/15/2019 9:06 AM by Lety Valiente M.D..              amiodarone 200 mg Oral BID With Meals   ammonium lactate 1 application Topical BID   apixaban 5 mg Oral Q12H   aspirin 81 mg Oral Daily   atorvastatin 10 mg Oral Nightly   bisoprolol 20 mg Oral Q24H   ceftriaxone 1 g Intravenous Q24H   diltiaZEM  mg Oral Q24H   docusate sodium 100 mg Oral BID   ferrous sulfate 324 mg Oral BID With Meals   guaiFENesin 600 mg Oral BID   insulin aspart 0-9 Units Subcutaneous 4x Daily AC & at Bedtime   ipratropium 0.5 mg Nebulization 4x Daily - RT   isosorbide mononitrate 15 mg Oral Daily   levothyroxine 150 mcg Oral Q AM   lidocaine 1 patch  Transdermal Q24H   multivitamin with minerals 1 tablet Oral Daily   pantoprazole 40 mg Oral Daily   polyethylene glycol 17 g Oral BID   saccharomyces boulardii 250 mg Oral BID   sodium chloride 3 mL Intravenous Q12H   ascorbic acid 500 mg Oral Daily   zinc sulfate 220 mg Oral BID          Assessment/Plan:    1.   Acute kidney injury  2.   CKD (chronic kidney disease) stage 4, GFR 15-29 ml/min (CMS/[facility])  3.   Anemia due to stage 4 chronic kidney disease (CMS/[facility])  4.   Refractory atrial flutter with RVR  5.   Acute bronchitis present on admission  6.   Chronic diastolic congestive heart failure (CMS/[facility])  7.   Hypothyroidism  8.   Diabetes mellitus (CMS/[facility])  9.   Paroxysmal atrial fibrillation (CMS/[facility])  10. Cor pulmonale, chronic (CMS/[facility])  11. Lymphedema of both lower extremities  12. Morbid obesity with BMI of 45.0-49.9, adult (CMS/[facility])    Plan:  · She appears to have significant edema, it is quite likely that she may have all this because of recurrent episodes of A. Fib/flutter.  · I will go ahead and start her on diuretics around-the-clock and see if he can get some of this fluid off.  · Her A. fib/flutter is being seen and evaluated by Dr. Aceves.  Echo reviewed.  · Continue with the current treatment plan.  · She has fairly poor muscle mass if her renal function worsens we will go ahead and get a 24-hour urine for creatinine clearance and proteinuria to see if she has significant proteinuria that may be leading to the third space fluid.  · Surveillance labs.  · Details were discussed with the patient as well as family in the room.    · Details were also discussed with the hospitalist service.   · Further recommendations will depend on clinical course of the patient during the current hospitalization.    · I also discussed the details with the nursing staff.  · Rest as ordered.    In closing, I sincerely appreciate opportunity to participate in care of this patient. If I can be of any further assistance with  the management of this patient, please don’t hesitate to contact me.    Milad Leone MD, SHANIN  08/15/19  9:41 AM    Dictated using Dragon.

## 2019-08-15 NOTE — CONSULTS
[facility]-Cardiology Consult Note    Referring Provider: Sera  Reason for Consultation: Atrial flutter with rapid ventricular response    Patient Care Team:  Marianela Albright APRN as PCP - General (Family Medicine)  Geremias Shepherd MD as Consulting Physician (General Surgery)  Lisa Cardoso MD as Surgeon (General Surgery)    Chief complaint : Shortness of breath    Subjective:    History of present illness: This is a 59-year-old patient with morbid obesity and Pickwickian syndrome\obstructive sleep apnea who was admitted to a local hospital emergency room with shortness of breath, cough and purulent sputum production.  Her chest x-ray showed no evidence of pneumonia and she was started on IV antibiotics for the diagnosis of acute bronchitis.  The patient had previously been noted to be in atrial flutter.  She had been therapeutically anticoagulated with a direct oral anticoagulant for several weeks.  On presentation the patient was noted to be in atrial flutter with rapid ventricular response.  Her heart rate was extremely difficult to control with oral medications.  She was transferred to our facility for consideration of SACHIN guided cardioversion in the morning.  However, the patient spontaneously cardioverted to sinus tachycardia.  I have recommended increasing the dose of her oral diltiazem and starting oral amiodarone at 200 mg twice per day.  She will continue bisoprolol for its cardioselective properties.  She is ruled out for myocardial infarction with serial cardiac troponins at the other facility.  Despite the excessive heart rates the patient had no ischemic ST-T wave changes.  She is essentially symptom-free at this point.    Review of Systems   Review of Systems   Constitution: Negative for chills, diaphoresis, fever, weakness, malaise/fatigue, weight gain and weight loss.   HENT: Negative for ear discharge, hearing loss, hoarse voice and nosebleeds.    Eyes: Negative for discharge, double vision, pain  and photophobia.   Cardiovascular: Positive for dyspnea on exertion and leg swelling. Negative for chest pain, claudication, cyanosis, irregular heartbeat, near-syncope, orthopnea, palpitations, paroxysmal nocturnal dyspnea and syncope.   Respiratory: Positive for cough, shortness of breath and sputum production. Negative for hemoptysis and wheezing.    Endocrine: Negative for cold intolerance, heat intolerance, polydipsia, polyphagia and polyuria.   Hematologic/Lymphatic: Negative for adenopathy and bleeding problem. Does not bruise/bleed easily.   Skin: Negative for color change, flushing, itching and rash.   Musculoskeletal: Negative for muscle cramps, muscle weakness, myalgias and stiffness.   Gastrointestinal: Negative for abdominal pain, diarrhea, hematemesis, hematochezia, nausea and vomiting.   Genitourinary: Negative for dysuria, frequency and nocturia.   Neurological: Negative for focal weakness, loss of balance, numbness, paresthesias and seizures.   Psychiatric/Behavioral: Negative for altered mental status, hallucinations and suicidal ideas.   Allergic/Immunologic: Negative for HIV exposure, hives and persistent infections.       History  Past Medical History:   Diagnosis Date   • A-fib (CMS/HCC)    • Anemia 10/2/2018   • Diabetes mellitus (CMS/[facility])    • Disease of thyroid gland    • GERD (gastroesophageal reflux disease)    • Gout    • History of transfusion    • Hypertension    ,   Past Surgical History:   Procedure Laterality Date   • EYE SURGERY     • INCISION AND DRAINAGE LEG Right 1/14/2019    Procedure: Right heel incision and drainage with graft application;  Surgeon: Ar Rueda DPM;  Location: [facility];  Service: Podiatry   • LEG SURGERY     ,   Family History   Problem Relation Age of Onset   • Asthma Mother    • Hypertension Father    • Stroke Father    ,   Social History     Tobacco Use   • Smoking status: Never Smoker   • Smokeless tobacco: Never Used   Substance Use Topics   •  Alcohol use: No     Frequency: Never   • Drug use: No   ,   Medications Prior to Admission   Medication Sig Dispense Refill Last Dose   • acetaminophen (TYLENOL) 325 MG tablet Take 650 mg by mouth Every 4 (Four) Hours As Needed for Mild Pain .   Past Week at Unknown time   • ammonium lactate (AMLACTIN) 12 % cream Apply 1 application topically to the appropriate area as directed 2 (Two) Times a Day.   8/13/2019 at 0900   • apixaban (ELIQUIS) 5 MG tablet tablet Take 5 mg by mouth 2 (Two) Times a Day.   8/13/2019 at 0900   • arginine 500 MG tablet Take 500 mg by mouth Daily.   8/14/2019 at 0900   • aspirin 81 MG chewable tablet Chew 81 mg Daily.  0 8/14/2019 at 0900   • bisoprolol (ZEBeta) 10 MG tablet 5 mg 2 (Two) Times a Day.   8/13/2019 at 0900   • diltiaZEM (CARDIZEM) 120 MG tablet Daily.   8/14/2019 at 0900   • docusate sodium (COLACE) 100 MG capsule Take 100 mg by mouth 2 (Two) Times a Day.   8/13/2019 at 0900   • Elastic Bandages & Supports (JOBST OPAQUE KNEE 20-30MMHG XL) misc 1 application Daily. 1 each 1 Past Week at Unknown time   • ferrous sulfate 324 (65 Fe) MG tablet delayed-release EC tablet Take 324 mg by mouth 2 (Two) Times a Day With Meals.   8/14/2019 at 0900   • insulin aspart (novoLOG) 100 UNIT/ML injection Inject  under the skin into the appropriate area as directed 2 (Two) Times a Day. Sliding scale, low dose    8/14/2019 at 1200   • isosorbide mononitrate (ISMO,MONOKET) 10 MG tablet Take 15 mg by mouth Daily.   8/14/2019 at 0900   • levothyroxine (SYNTHROID, LEVOTHROID) 150 MCG tablet Take 150 mcg by mouth Daily.   8/14/2019 at 0900   • lidocaine (LIDODERM) 5 % Place 1 patch on the skin as directed by provider Daily. Apply patch to left knee daily, on at 0700 am off at 2000   8/13/2019 at 0800   • Multiple Vitamins-Minerals (MULTIVITAMIN WITH MINERALS) tablet tablet Take 1 tablet by mouth Daily.   8/14/2019 at 0900   • nitroglycerin (NITROSTAT) 0.4 MG SL tablet Place 0.4 mg under the tongue  "Every 5 (Five) Minutes As Needed for Chest Pain. Take no more than 3 doses in 15 minutes.   Past Month at Unknown time   • Nutritional Supplements (PROTEIN SUPPLEMENT 80% PO) Take 30 mL by mouth 2 (Two) Times a Day.   8/13/2019 at 0900   • pantoprazole (PROTONIX) 40 MG EC tablet Take 40 mg by mouth Daily.   8/14/2019 at 0900   • polyethylene glycol (MIRALAX) packet Take 17 g by mouth 2 (Two) Times a Day.   8/14/2019 at 0900   • RA VITAMIN C 500 MG tablet Take 500 mg by mouth Daily.  0 8/14/2019 at 0900   • saccharomyces boulardii (FLORASTOR) 250 MG capsule Take 250 mg by mouth 2 (Two) Times a Day.   8/14/2019 at 0900   • simvastatin (ZOCOR) 20 MG tablet Take 20 mg by mouth Every Night.  0 8/14/2019 at 0900   • Zinc Sulfate 220 (50 Zn) MG tablet Take 1 tablet by mouth 2 (Two) Times a Day.  0 8/14/2019 at 0900    and Allergies:  Metformin and related    Objective:    Vital Sign Min/Max for last 24 hours  Temp  Min: 98.6 °F (37 °C)  Max: 98.6 °F (37 °C)   BP  Min: 144/97  Max: 176/119   Pulse  Min: 116  Max: 118   Resp  Min: 18  Max: 22   SpO2  Min: 87 %  Max: 96 %   No Data Recorded   Weight  Min: 144 kg (316 lb 9.6 oz)  Max: 144 kg (316 lb 9.6 oz)     Flowsheet Rows      First Filed Value   Admission Height  170.2 cm (67\") Documented at 08/14/2019 1725   Admission Weight  144 kg (316 lb 9.6 oz)  (Abnormal)  Documented at 08/14/2019 1659             Physical Exam:   Physical Exam   Constitutional: She is oriented to person, place, and time. She appears well-developed and well-nourished. No distress.   HENT:   Head: Normocephalic and atraumatic.   Mouth/Throat: Oropharynx is clear and moist.   Eyes: Conjunctivae and EOM are normal. Pupils are equal, round, and reactive to light. No scleral icterus.   Neck: Normal range of motion. Neck supple. No JVD present. No tracheal deviation present. No thyromegaly present.   Cardiovascular: Normal rate, regular rhythm, S1 normal, S2 normal, normal heart sounds, intact distal " pulses and normal pulses. PMI is not displaced. Exam reveals no gallop and no friction rub.   No murmur heard.  Pulmonary/Chest: Effort normal and breath sounds normal. No stridor. No respiratory distress. She has no wheezes. She has no rales.   Abdominal: Soft. Bowel sounds are normal. She exhibits no distension and no mass. There is no tenderness. There is no rebound and no guarding.   Musculoskeletal: Normal range of motion. She exhibits edema. She exhibits no deformity.   Neurological: She is alert and oriented to person, place, and time. She displays normal reflexes. No cranial nerve deficit. Coordination normal.   Skin: Skin is warm and dry. No rash noted. She is not diaphoretic. No erythema.   Psychiatric: She has a normal mood and affect. Her behavior is normal. Thought content normal.       Results Review:   I reviewed the patient's new clinical results.  Results from last 7 days   Lab Units 08/13/19  1745   WBC K/uL 3.9*   HEMOGLOBIN g/dL 10.8*   HEMATOCRIT % 36.1*   PLATELETS K/uL 131*         Lab Results   Lab Value Date/Time    TROPONINT <0.010 08/14/2019 1743             Assessment/Plan:      Hypothyroidism    Diabetes mellitus (CMS/[facility])    Anemia due to stage 4 chronic kidney disease (CMS/[facility])    Chronic diastolic congestive heart failure (CMS/[facility])    Paroxysmal atrial fibrillation (CMS/[facility])    Cor pulmonale, chronic (CMS/[facility])    Lymphedema of both lower extremities    Morbid obesity with BMI of 45.0-49.9, adult (CMS/[facility])    A-fib (CMS/[facility])    CKD (chronic kidney disease) stage 3, GFR 30-59 ml/min (CMS/[facility])    Acute bronchitis due to other specified organisms      As the patient has now spontaneously cardioverted normal sinus rhythm she will not require synchronized external cardioversion.  I have started her on oral loading of amiodarone.  I have recommended increasing the dose of her diltiazem CD to 300 mg once per day.  I have recommended changing her bisoprolol to 20 mg orally once per day.  All  of her other medications will remain the same.    I discussed the patients findings and my recommendations with patient and nursing staff    Kevin Aceves MD  08/14/19  8:49 PM

## 2019-08-15 NOTE — NURSING NOTE
Patient has MEWS score of 4.  Breeding aware of status post cardioversion. He stated that BP and HR will rise slowly.

## 2019-08-16 PROBLEM — E11.21 TYPE 2 DIABETES MELLITUS WITH NEPHROPATHY: Status: ACTIVE | Noted: 2019-01-10

## 2019-08-16 LAB
ALBUMIN SERPL-MCNC: 3.6 G/DL (ref 3.5–5.2)
ANION GAP SERPL CALCULATED.3IONS-SCNC: 10.6 MMOL/L (ref 5–15)
ANISOCYTOSIS BLD QL: NORMAL
BASOPHILS # BLD AUTO: 0.03 10*3/MM3 (ref 0–0.2)
BASOPHILS NFR BLD AUTO: 0.5 % (ref 0–1.5)
BUN BLD-MCNC: 37 MG/DL (ref 8–23)
BUN/CREAT SERPL: 17.5 (ref 7–25)
CALCIUM SPEC-SCNC: 9 MG/DL (ref 8.6–10.5)
CHLORIDE SERPL-SCNC: 106 MMOL/L (ref 98–107)
CO2 SERPL-SCNC: 24.4 MMOL/L (ref 22–29)
CREAT BLD-MCNC: 2.11 MG/DL (ref 0.57–1)
CREAT UR-MCNC: 68.3 MG/DL
DACRYOCYTES BLD QL SMEAR: NORMAL
DEPRECATED RDW RBC AUTO: 61.1 FL (ref 37–54)
ELLIPTOCYTES BLD QL SMEAR: NORMAL
EOSINOPHIL # BLD AUTO: 0.29 10*3/MM3 (ref 0–0.4)
EOSINOPHIL NFR BLD AUTO: 4.6 % (ref 0.3–6.2)
ERYTHROCYTE [DISTWIDTH] IN BLOOD BY AUTOMATED COUNT: 20.5 % (ref 12.3–15.4)
GFR SERPL CREATININE-BSD FRML MDRD: 24 ML/MIN/1.73
GFR SERPL CREATININE-BSD FRML MDRD: 29 ML/MIN/1.73
GLUCOSE BLD-MCNC: 176 MG/DL (ref 65–99)
GLUCOSE BLDC GLUCOMTR-MCNC: 153 MG/DL (ref 70–130)
GLUCOSE BLDC GLUCOMTR-MCNC: 156 MG/DL (ref 70–130)
GLUCOSE BLDC GLUCOMTR-MCNC: 172 MG/DL (ref 70–130)
GLUCOSE BLDC GLUCOMTR-MCNC: 217 MG/DL (ref 70–130)
HCT VFR BLD AUTO: 34 % (ref 34–46.6)
HGB BLD-MCNC: 10.1 G/DL (ref 12–15.9)
HYPOCHROMIA BLD QL: NORMAL
IMM GRANULOCYTES # BLD AUTO: 0.02 10*3/MM3 (ref 0–0.05)
IMM GRANULOCYTES NFR BLD AUTO: 0.3 % (ref 0–0.5)
LYMPHOCYTES # BLD AUTO: 0.93 10*3/MM3 (ref 0.7–3.1)
LYMPHOCYTES NFR BLD AUTO: 14.8 % (ref 19.6–45.3)
MAGNESIUM SERPL-MCNC: 1.8 MG/DL (ref 1.6–2.4)
MCH RBC QN AUTO: 24.5 PG (ref 26.6–33)
MCHC RBC AUTO-ENTMCNC: 29.7 G/DL (ref 31.5–35.7)
MCV RBC AUTO: 82.3 FL (ref 79–97)
MONOCYTES # BLD AUTO: 0.49 10*3/MM3 (ref 0.1–0.9)
MONOCYTES NFR BLD AUTO: 7.8 % (ref 5–12)
MRSA SPEC QL CULT: NORMAL
NEUTROPHILS # BLD AUTO: 4.52 10*3/MM3 (ref 1.7–7)
NEUTROPHILS NFR BLD AUTO: 72 % (ref 42.7–76)
NRBC BLD AUTO-RTO: 0 /100 WBC (ref 0–0.2)
PHOSPHATE SERPL-MCNC: 3.4 MG/DL (ref 2.5–4.5)
PLATELET # BLD AUTO: 145 10*3/MM3 (ref 140–450)
PMV BLD AUTO: 10 FL (ref 6–12)
POIKILOCYTOSIS BLD QL SMEAR: NORMAL
POTASSIUM BLD-SCNC: 4.5 MMOL/L (ref 3.5–5.2)
PROT UR-MCNC: 136 MG/DL
PROT/CREAT UR: 1991.2 MG/G CREA (ref 0–200)
RBC # BLD AUTO: 4.13 10*6/MM3 (ref 3.77–5.28)
SMALL PLATELETS BLD QL SMEAR: ADEQUATE
SODIUM BLD-SCNC: 141 MMOL/L (ref 136–145)
VRE SPEC QL CULT: ABNORMAL
WBC MORPH BLD: NORMAL
WBC NRBC COR # BLD: 6.28 10*3/MM3 (ref 3.4–10.8)

## 2019-08-16 PROCEDURE — 84156 ASSAY OF PROTEIN URINE: CPT | Performed by: INTERNAL MEDICINE

## 2019-08-16 PROCEDURE — 80069 RENAL FUNCTION PANEL: CPT | Performed by: INTERNAL MEDICINE

## 2019-08-16 PROCEDURE — 85025 COMPLETE CBC W/AUTO DIFF WBC: CPT | Performed by: INTERNAL MEDICINE

## 2019-08-16 PROCEDURE — 82575 CREATININE CLEARANCE TEST: CPT | Performed by: INTERNAL MEDICINE

## 2019-08-16 PROCEDURE — 81050 URINALYSIS VOLUME MEASURE: CPT | Performed by: INTERNAL MEDICINE

## 2019-08-16 PROCEDURE — 99232 SBSQ HOSP IP/OBS MODERATE 35: CPT | Performed by: INTERNAL MEDICINE

## 2019-08-16 PROCEDURE — 94799 UNLISTED PULMONARY SVC/PX: CPT

## 2019-08-16 PROCEDURE — 63710000001 INSULIN ASPART PER 5 UNITS: Performed by: INTERNAL MEDICINE

## 2019-08-16 PROCEDURE — 99231 SBSQ HOSP IP/OBS SF/LOW 25: CPT | Performed by: INTERNAL MEDICINE

## 2019-08-16 PROCEDURE — 85007 BL SMEAR W/DIFF WBC COUNT: CPT | Performed by: INTERNAL MEDICINE

## 2019-08-16 PROCEDURE — 82570 ASSAY OF URINE CREATININE: CPT | Performed by: INTERNAL MEDICINE

## 2019-08-16 PROCEDURE — 82962 GLUCOSE BLOOD TEST: CPT

## 2019-08-16 PROCEDURE — 83735 ASSAY OF MAGNESIUM: CPT | Performed by: INTERNAL MEDICINE

## 2019-08-16 RX ORDER — TORSEMIDE 20 MG/1
40 TABLET ORAL DAILY
Status: DISCONTINUED | OUTPATIENT
Start: 2019-08-16 | End: 2019-08-18

## 2019-08-16 RX ORDER — CEFUROXIME AXETIL 250 MG/1
500 TABLET ORAL EVERY 12 HOURS SCHEDULED
Status: DISPENSED | OUTPATIENT
Start: 2019-08-16 | End: 2019-08-19

## 2019-08-16 RX ADMIN — Medication 220 MG: at 09:12

## 2019-08-16 RX ADMIN — SPIRONOLACTONE 25 MG: 25 TABLET ORAL at 09:12

## 2019-08-16 RX ADMIN — Medication 1 APPLICATION: at 09:12

## 2019-08-16 RX ADMIN — IPRATROPIUM BROMIDE 0.5 MG: 0.5 SOLUTION RESPIRATORY (INHALATION) at 12:57

## 2019-08-16 RX ADMIN — GUAIFENESIN 600 MG: 600 TABLET, EXTENDED RELEASE ORAL at 09:11

## 2019-08-16 RX ADMIN — APIXABAN 5 MG: 5 TABLET, FILM COATED ORAL at 09:12

## 2019-08-16 RX ADMIN — POLYETHYLENE GLYCOL 3350 17 G: 17 POWDER, FOR SOLUTION ORAL at 09:11

## 2019-08-16 RX ADMIN — AMIODARONE HYDROCHLORIDE 100 MG: 200 TABLET ORAL at 09:11

## 2019-08-16 RX ADMIN — FERROUS SULFATE TAB EC 324 MG (65 MG FE EQUIVALENT) 324 MG: 324 (65 FE) TABLET DELAYED RESPONSE at 17:51

## 2019-08-16 RX ADMIN — INSULIN ASPART 2 UNITS: 100 INJECTION, SOLUTION INTRAVENOUS; SUBCUTANEOUS at 06:48

## 2019-08-16 RX ADMIN — BUMETANIDE 2 MG: 0.25 INJECTION INTRAMUSCULAR; INTRAVENOUS at 01:01

## 2019-08-16 RX ADMIN — IPRATROPIUM BROMIDE 0.5 MG: 0.5 SOLUTION RESPIRATORY (INHALATION) at 19:39

## 2019-08-16 RX ADMIN — DOCUSATE SODIUM 100 MG: 100 CAPSULE, LIQUID FILLED ORAL at 22:15

## 2019-08-16 RX ADMIN — CEFUROXIME AXETIL 500 MG: 250 TABLET ORAL at 22:14

## 2019-08-16 RX ADMIN — PANTOPRAZOLE SODIUM 40 MG: 40 TABLET, DELAYED RELEASE ORAL at 09:11

## 2019-08-16 RX ADMIN — LIDOCAINE 1 PATCH: 50 PATCH CUTANEOUS at 19:17

## 2019-08-16 RX ADMIN — LEVOTHYROXINE SODIUM 150 MCG: 150 TABLET ORAL at 06:48

## 2019-08-16 RX ADMIN — ASPIRIN 81 MG 81 MG: 81 TABLET ORAL at 09:11

## 2019-08-16 RX ADMIN — Medication 250 MG: at 09:11

## 2019-08-16 RX ADMIN — GUAIFENESIN 600 MG: 600 TABLET, EXTENDED RELEASE ORAL at 22:15

## 2019-08-16 RX ADMIN — MULTIPLE VITAMINS W/ MINERALS TAB 1 TABLET: TAB at 09:11

## 2019-08-16 RX ADMIN — Medication 1 APPLICATION: at 22:17

## 2019-08-16 RX ADMIN — Medication 220 MG: at 22:15

## 2019-08-16 RX ADMIN — INSULIN ASPART 4 UNITS: 100 INJECTION, SOLUTION INTRAVENOUS; SUBCUTANEOUS at 22:16

## 2019-08-16 RX ADMIN — DOCUSATE SODIUM 100 MG: 100 CAPSULE, LIQUID FILLED ORAL at 09:12

## 2019-08-16 RX ADMIN — IPRATROPIUM BROMIDE 0.5 MG: 0.5 SOLUTION RESPIRATORY (INHALATION) at 07:09

## 2019-08-16 RX ADMIN — POLYETHYLENE GLYCOL 3350 17 G: 17 POWDER, FOR SOLUTION ORAL at 22:14

## 2019-08-16 RX ADMIN — ATORVASTATIN CALCIUM 10 MG: 10 TABLET, FILM COATED ORAL at 22:18

## 2019-08-16 RX ADMIN — Medication 250 MG: at 22:15

## 2019-08-16 RX ADMIN — SODIUM CHLORIDE, PRESERVATIVE FREE 3 ML: 5 INJECTION INTRAVENOUS at 22:16

## 2019-08-16 RX ADMIN — FERROUS SULFATE TAB EC 324 MG (65 MG FE EQUIVALENT) 324 MG: 324 (65 FE) TABLET DELAYED RESPONSE at 09:11

## 2019-08-16 RX ADMIN — INSULIN ASPART 2 UNITS: 100 INJECTION, SOLUTION INTRAVENOUS; SUBCUTANEOUS at 12:20

## 2019-08-16 RX ADMIN — ISOSORBIDE MONONITRATE 15 MG: 30 TABLET, EXTENDED RELEASE ORAL at 09:12

## 2019-08-16 RX ADMIN — SODIUM CHLORIDE, PRESERVATIVE FREE 3 ML: 5 INJECTION INTRAVENOUS at 09:13

## 2019-08-16 RX ADMIN — BISOPROLOL FUMARATE 10 MG: 5 TABLET ORAL at 09:12

## 2019-08-16 RX ADMIN — INSULIN ASPART 2 UNITS: 100 INJECTION, SOLUTION INTRAVENOUS; SUBCUTANEOUS at 17:51

## 2019-08-16 RX ADMIN — APIXABAN 5 MG: 5 TABLET, FILM COATED ORAL at 22:15

## 2019-08-16 RX ADMIN — OXYCODONE HYDROCHLORIDE AND ACETAMINOPHEN 500 MG: 500 TABLET ORAL at 09:12

## 2019-08-16 RX ADMIN — TORSEMIDE 40 MG: 20 TABLET ORAL at 09:12

## 2019-08-16 NOTE — PROGRESS NOTES
"[facility]-Cardiology Progress note     LOS: 2 days   Patient Care Team:  Marianela Albright APRN as PCP - General (Family Medicine)  Geremias Shepherd MD as Consulting Physician (General Surgery)  Lisa Cardoso MD as Surgeon (General Surgery)    Chief Complaint: Shortness of breath    Subjective:    Interval History: The patient underwent successful synchronized external cardioversion yesterday after a transesophageal echocardiogram showed no high risk features.  She was initially transiently mildly bradycardic but asymptomatic.  Her heart rate has gradually improved overnight with down titration of her medications.  Her transesophageal echocardiogram demonstrates that her ejection fraction has dropped from greater than 55% to 35%.  She has global hypokinesis consistent with tachycardia-mediated cardiomyopathy.  The patient indicates that she does not feel any better symptomatically after successful cardioversion.    Patient Complaints: Cough with inability to expectorate sputum  Patient Denies:   Chest pain, shortness of breath, orthopnea, peripheral edema, palpitations, sputum production, hemoptysis, abdominal pain, nausea, vomiting, diarrhea, fevers chills or night sweats  History taken from: patient    Review of Systems:   All systems were reviewed and negative       Objective:    Vital Sign Min/Max for last 24 hours  Temp  Min: 97.7 °F (36.5 °C)  Max: 98.2 °F (36.8 °C)   BP  Min: 96/52  Max: 134/76   Pulse  Min: 36  Max: 109   Resp  Min: 16  Max: 20   SpO2  Min: 91 %  Max: 100 %   No Data Recorded   Weight  Min: 143 kg (315 lb 3.2 oz)  Max: 143 kg (316 lb 4.8 oz)     Flowsheet Rows      First Filed Value   Admission Height  170.2 cm (67\") Documented at 08/14/2019 1725   Admission Weight  144 kg (316 lb 9.6 oz)  (Abnormal)  Documented at 08/14/2019 1659          Physical Exam:     General Appearance:    Alert, cooperative, in no acute distress   Head:    Normocephalic, without obvious abnormality, atraumatic   Eyes:    "         Lids and lashes normal, conjunctivae and sclerae normal, no   icterus, no pallor, corneas clear, PERRLA   Ears:    Ears appear intact with no abnormalities noted   Throat:   No oral lesions, no thrush, oral mucosa moist   Neck:   No adenopathy, supple, trachea midline, no thyromegaly, no     carotid bruit, no JVD   Back:     No kyphosis present, no scoliosis present, no skin lesions,       erythema or scars, no tenderness to percussion or                   palpation,   range of motion normal   Lungs:     Clear to auscultation,respirations regular, even and                   unlabored    Heart:    Regular rhythm and normal rate, normal S1 and S2, no            murmur, no gallop, no rub, no click   Breast Exam:    Deferred   Abdomen:     Normal bowel sounds, no masses, no organomegaly, soft        non-tender, non-distended, no guarding, no rebound                 tenderness   Genitalia:    Deferred   Extremities:   Moves all extremities well, (+) edema, no cyanosis, no              redness   Pulses:   Pulses palpable and equal bilaterally   Skin:   No bleeding, bruising or rash   Lymph nodes:   No palpable adenopathy   Neurologic:   Cranial nerves 2 - 12 grossly intact, sensation intact, DTR        present and equal bilaterally        Results Review:     I reviewed the patient's new clinical results.      Results from last 7 days   Lab Units 08/16/19  0607   SODIUM mmol/L 141   POTASSIUM mmol/L 4.5   CHLORIDE mmol/L 106   CO2 mmol/L 24.4   BUN mg/dL 37*   CREATININE mg/dL 2.11*   GLUCOSE mg/dL 176*   CALCIUM mg/dL 9.0     Results from last 7 days   Lab Units 08/16/19  0606 08/15/19  0454 08/13/19  1745   WBC 10*3/mm3 6.28 4.60 3.9*   HEMOGLOBIN g/dL 10.1* 9.7* 10.8*   HEMATOCRIT % 34.0 32.2* 36.1*   PLATELETS 10*3/mm3 145 134* 131*         Echo EF Estimated  ECHOEFEST: 35%      Physical Exam    Medication Review: yes    Assessment/Plan:      Hypothyroidism    Diabetes mellitus (CMS/HCC)    Anemia due to stage  4 chronic kidney disease (CMS/[facility])    Chronic Mixed Systolic\Diastolic congestive heart failure (CMS/[facility])- HFrEF    Paroxysmal atrial fibrillation (CMS/[facility])    Cor pulmonale, chronic (CMS/[facility])    Lymphedema of both lower extremities    Morbid obesity with BMI of 45.0-49.9, adult (CMS/[facility])    A-fib (CMS/[facility])    CKD (chronic kidney disease) stage 3, GFR 30-59 ml/min (CMS/[facility])    Acute bronchitis due to other specified organisms      Status post SACHIN guided synchronized external cardioversion yesterday.  The patient achieved return of normal sinus rhythm and was initially mildly bradycardic.  Her heart rate has increased to the mid 50s-lower 60s.  I have recommended decreasing her amiodarone to 100 mg orally once per day.  I have recommended discontinuation of long-acting oral diltiazem.  I have decreased her Bystolic dose to 10 mg orally once per day.  I will hold off on initiating low-dose digoxin until her heart rate improves further.  We will start low-dose Spironolactone.  If her blood pressure will tolerate later in the course of her hospitalization we will start low-dose ACE inhibitor therapy.  By transesophageal echocardiogram, her left ventricular ejection fraction has decreased from greater than 55% two months ago to 35-40% with diffuse hypokinesis consistent with long-standing poorly controlled atrial fibrillation.  I think this is a tachycardia-mediated transient cardiomyopathy which should improve over time with maintenance of normal sinus rhythm and heart rate control.  We will reassess by echocardiogram in 2-3 months as an outpatient.  I think some of her cough\congestion may be related to HFrEF.  In addition to starting low-dose Spironolactone I will also start oral diuretic therapy with Demadex 40 mg once in the morning.  We will ask for the dietitian to see the patient to discuss fluid and sodium restriction.  I am recommending a 1.5 L/day fluid restriction and 1200 mg/day of sodium  restriction.        Kevin Aceves MD  08/16/19  7:42 AM

## 2019-08-16 NOTE — PROGRESS NOTES
[facility]    Name:  Millicent Mckeon   Age:  59 y.o.  Sex:  female  :  1960  MRN:  [id number]   Visit Number:  [id number]  Admission Date:  2019  Date Of Service:  19  Primary Care Physician:  Marianela Albright APRN     LOS: 2 days :  Patient Care Team:  Marianela Albright APRN as PCP - General (Family Medicine)  Geremias Shepherd MD as Consulting Physician (General Surgery)  Lisa Cardoso MD as Surgeon (General Surgery):    Chief Complaint:      Shortness of breath and generalized weakness.    Subjective / Interval History:     Ms. Mckeon is currently lying down on the bed and is comfortable at rest except for generalized weakness.  She did undergo transesophageal echocardiogram with cardioversion for her atrial fibrillation this morning by Dr. Aceves.  She is currently in sinus rhythm with sinus bradycardia in the 60s.  She denies any chest pain.  She has been continued on Eliquis which is her home medication.    This is a 59-year-old female with history of atrial fibrillation, hypertension, morbid obesity, diabetes, chronic kidney disease, poor functional status and hypothyroidism was transferred from [facility] with atrial fibrillation with rapid ventricular rate resistant to multiple medical therapy.  She was seen by Dr. Aceves and subsequently underwent DC cardioversion on 2019.  Following the cardioversion she was noted to have sinus bradycardia and her amiodarone therapy was decreased to 100 mg daily.  Dr. Aceves did make adjustment to her medications with decreasing the dose of Bystolic to 10 mg daily.  Transesophageal echocardiogram showed reduction in her left ventricular ejection fraction from 55% to 35% which is attributed likely to the tachycardia mediated transient cardiomyopathy.  Patient also has history of chronic cough and shortness of breath which was thought to be related to her diastolic heart failure.   She was started on low-dose spironolactone as well as torsemide by Dr. Aceves.    Patient does have chronic kidney disease and was seen by Dr. Leone.  Her GFR is around 24 and he recommended 24-hour urine collection for proteinuria.  Patient states that she is currently at the short-term rehabilitation facility at [facility] and rehab but prior to that she was staying with her sister [place].  Apparently patient has been bedbound for the last several months and uses a wheelchair for ambulation.  Her sister is currently on hemodialysis 3 times weekly.    Review of Systems:     General ROS: Patient denies any fevers, chills or loss of consciousness.  Complains of generalized weakness.  Respiratory ROS: Baseline shortness of breath.  Cardiovascular ROS: Denies chest pain or palpitations. No history of exertional chest pain.  Gastrointestinal ROS: Denies nausea and vomiting. Denies any abdominal pain. No diarrhea.  Neurological ROS: Denies any focal weakness. No loss of consciousness. Denies any numbness.  Dermatological ROS: Denies any redness or pruritis.    Vital Signs:    Temp:  [97.7 °F (36.5 °C)-98 °F (36.7 °C)] 97.9 °F (36.6 °C)  Heart Rate:  [36-58] 52  Resp:  [16-20] 18  BP: ()/(52-77) 131/77    Intake and output:    I/O last 3 completed shifts:  In: 76.5 [I.V.:76.5]  Out: 2775 [Urine:2775]  I/O this shift:  In: 240 [P.O.:240]  Out: -     Physical Examination:    General Appearance:  Alert and cooperative, not in any acute distress.   Head:  Atraumatic and normocephalic, without obvious abnormality.   Eyes:          PERRLA, conjunctivae and sclerae normal, no Icterus. No pallor. Extraocular movements are within normal limits.   Neck: Supple, trachea midline, no thyromegaly, no carotid bruit.   Lungs:   Chest shape is normal. Breath sounds decreased bilaterally in the bases.  No crackles or wheezing. No Pleural rub or bronchial breathing.   Heart:  Normal S1 and S2, no murmur, no gallop, no rub. No  JVD   Abdomen:   Normal bowel sounds, no masses, no organomegaly. Soft, non-tender, obese with a large pannus, no guarding, no rebound tenderness.  Wilkinson catheter is in place.   Extremities: Moves all extremities well, 3+ edema, no cyanosis, no clubbing.  Skin thickening and keratosis noted on the legs.   Skin: No bleeding.   Neurologic: Awake, alert and oriented times 3. Moves all 4 extremities equally.     Laboratory results:    Results from last 7 days   Lab Units 08/16/19  0607 08/15/19  0454   SODIUM mmol/L 141 141   POTASSIUM mmol/L 4.5 4.5   CHLORIDE mmol/L 106 106   CO2 mmol/L 24.4 23.3   BUN mg/dL 37* 30*   CREATININE mg/dL 2.11* 1.85*   CALCIUM mg/dL 9.0 9.0   BILIRUBIN mg/dL  --  0.3   ALK PHOS U/L  --  152*   ALT (SGPT) U/L  --  11   AST (SGOT) U/L  --  15   GLUCOSE mg/dL 176* 238*     Results from last 7 days   Lab Units 08/16/19  0606 08/15/19  0454 08/13/19  1745   WBC 10*3/mm3 6.28 4.60 3.9*   HEMOGLOBIN g/dL 10.1* 9.7* 10.8*   HEMATOCRIT % 34.0 32.2* 36.1*   PLATELETS 10*3/mm3 145 134* 131*         Results from last 7 days   Lab Units 08/15/19  0454 08/14/19  2317 08/14/19  1743 08/13/19  1745   TROPONIN I ng/mL  --   --   --  <0.30   TROPONIN T ng/mL <0.010 <0.010 <0.010  --      Results from last 7 days   Lab Units 08/14/19  1636   MRSA SCREEN CX  No Methicillin Resistant Staphylococcus aureus isolated     I have reviewed the patient's laboratory results.    Radiology results:    Imaging Results (last 24 hours)     ** No results found for the last 24 hours. **        Medication Review:     I have reviewed the patients active and prn medications.       Hypothyroidism    Diabetes mellitus (CMS/HCC)    Anemia due to stage 4 chronic kidney disease (CMS/HCC)    Chronic diastolic congestive heart failure (CMS/HCC)    Paroxysmal atrial fibrillation (CMS/HCC)    Cor pulmonale, chronic (CMS/HCC)    Lymphedema of both lower extremities    Morbid obesity with BMI of 45.0-49.9, adult (CMS/HCC)    A-fib  (CMS/[facility])    CKD (chronic kidney disease) stage 3, GFR 30-59 ml/min (CMS/[facility])    Acute bronchitis due to other specified organisms    Assessment:    1.  Atrial fibrillation with rapid ventricular rate, status post cardioversion on 8/16/2019.  2.  Acute on chronic systolic heart failure with ejection fraction of 35%.  3.  Acute renal failure with underlying chronic kidney disease stage III.  4.  Chronic cor pulmonale.  5.  Chronic hypoxic respiratory failure on home oxygen.  6.  Acute bronchitis on antibiotics.  7.  Morbid obesity with a BMI of 50.  8.  Diabetes mellitus type 2 with nephropathy.  9.  Chronic venous insufficiency of the lower extremities.  10.  Anemia of chronic kidney disease.  11.  Functional quadriplegia.    Plan:    Ms. Mckeon is currently in sinus rhythm following cardioversion.  Her medications have been appropriately adjusted by Dr. Aceves and I have discussed the patient's condition and treatment plan with him.  She will be continued on Eliquis.  Her amiodarone has been decreased to 200 mg daily.  Due to her worsening left ventricular function, she has been started on spironolactone and torsemide.    She is being followed by Dr. Leone for her acute on chronic kidney disease.  I have discussed the patient's condition with him.  He thinks that patient does have diabetic nephropathy and may eventually need hemodialysis.  We are planning to collect the urine for 24 hours to measure proteinuria.  We will initiate her on physical and occupational therapy.  She is on Rocephin for acute bronchitis and I will change it to Ceftin for another 3 days.  She will be continued on probiotic supplements.  I anticipate another 2 days of inpatient hospital stay.  At discharge, she would benefit from going back to her short-term rehabilitation.    Samson Reyes MD  08/16/19  11:11 AM    Dictated utilizing Dragon dictation.

## 2019-08-16 NOTE — DISCHARGE PLACEMENT REQUEST
"Azalia Mcfadden -[phone] fax [phone]        Tatiana Mckeon (59 y.o. Female)     Date of Birth Social Security Number Address Home Phone MRN    1960  [address]  [facility] [address] [phone] [id number]    Holiness Marital Status          Unknown Single       Admission Date Admission Type Admitting Provider Attending Provider Department, Room/Bed    8/14/19 Urgent Elmer Lopez DO Pais, Roshan, MD 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)   Code Status:  CPR    Ht:  170.2 cm (67\")   Wt:  143 kg (316 lb 4.8 oz)    Admission Cmt:  None   Principal Problem:  None                Active Insurance as of 8/14/2019     Primary Coverage     Payor Plan Insurance Group Employer/Plan Group    MEDICARE MEDICARE A & B      Payor Plan Address Payor Plan Phone Number Payor Plan Fax Number Effective Dates    [address] [phone]  12/1/1998 - None Entered    [facility] [id number]       Subscriber Name Subscriber Birth Date Member ID       TATIANA MCKEON 1960 [id number]           Secondary Coverage     Payor Plan Insurance Group Employer/Plan Group    [place]      Payor Plan Address Payor Plan Phone Number Payor Plan Fax Number Effective Dates    [address] [phone]  10/3/2018 - None Entered    [facility] [id number]       Subscriber Name Subscriber Birth Date Member ID       TATIANA MCKEON 1960 [id number]                 Emergency Contacts      (Rel.) Home Phone Work Phone Mobile Phone    Lovely Mckeon (Power of ) [phone] -- --    SUNDAY CHACON (Relative) [phone] -- [phone]    Adolfo Mckeon (Brother) [phone] -- --               History & Physical    "   Elmer Lopez, DO at 2019  5:05 PM              [facility]      Name:  Millicent Mckeon   Age:  59 y.o.  Sex:  female  :  1960  MRN:  [id number]   Visit Number:  [id number]  Admission Date:  2019  Date Of Service:  19  Primary Care Physician:  Marianela Albright APRN    History Obtained From:    patient and consulting provider    Chief Complaint:     Arrhythmia    History Of Presenting Illness:      Patient is a 59-year-old  female with history of a flutter, essential hypertension, morbid obesity, hypothyroidism, diabetes mellitus type 2 who is transferred from [facility] at the request of Dr. Aecves due to persistent a flutter.  She has been tried on diltiazem, beta-blocker, dig, amiodarone without relief.  She is on Eliquis.  She is sent over here for cardioversion tomorrow morning per Dr. Aceves.  She is asymptomatic with this.  She does have mild congestive heart failure on her chest x-ray.  Patient states she has had a productive cough with yellow sputum, decreased appetite, and fever and chills.  She has been at a nursing home in the past 6 months, and respiratory symptoms are why she was sent in to the hospital.  Her troponin was negative over there.  WBC count was actually 3.9.  She was placed on Rocephin, she states she feels better.  She also follows with Dr. Leone, her creatinine is 1.7 today.  Will consult him as he has followed her in the past intermittently.    She currently denies any pain.  She denies any chest pressure, shortness of breath, nausea or vomiting.  Her cough is improved.  She has not ambulated for at least 6 months.  She has significant peripheral edema of her legs.  Her weight is well over 300 pounds.  She is admitted for cardiac evaluation.  She refuses PT and OT evaluation here.  Reviewed echocardiogram from 2019 which showed preserved ejection fraction with pulmonary  hypertension.      Review Of Systems:     General ROS: positive for  - fatigue and malaise  Psychological ROS: negative  Ophthalmic ROS: negative  ENT ROS: negative  Allergy and Immunology ROS: negative  Hematological and Lymphatic ROS: negative  Endocrine ROS: negative  Breast ROS: negative  Respiratory ROS: positive for - cough, shortness of breath and sputum changes  Cardiovascular ROS: positive for - irregular heartbeat and rapid heart rate  Gastrointestinal ROS: negative  Genito-Urinary ROS: negative  Musculoskeletal ROS: positive for - muscular weakness  Neurological ROS: negative  Dermatological ROS: negative       Past Medical History:    A flutter, GERD, hypertension, hypothyroidism, thrombocytopenia, diabetes mellitus type 2, morbid obesity    Past Surgical history:    Cataract surgery, left hip surgery, colonoscopy and EGD in October 2018      Social History:    Patient never smoked, does not drink alcohol or use drugs.  Her siblings are power of .  She has no children and has never been .  She wants to be a full code.    Family History:    Mother had asthma, father had hypertension and CVA    Allergies:      Metformin and related    Home Medications:    Prior to Admission Medications     Prescriptions Last Dose Informant Patient Reported? Taking?    acetaminophen (TYLENOL) 325 MG tablet   Yes No    Take 650 mg by mouth Every 4 (Four) Hours As Needed for Mild Pain .    ammonium lactate (AMLACTIN) 12 % cream   Yes No    Apply 1 application topically to the appropriate area as directed 2 (Two) Times a Day.    arginine 500 MG tablet   Yes No    Take 500 mg by mouth Daily.    aspirin 81 MG chewable tablet   Yes No    Chew 81 mg Daily.    bisoprolol (ZEBeta) 10 MG tablet   Yes No    2 (Two) Times a Day.    diltiaZEM (CARDIZEM) 120 MG tablet   Yes No    Daily.    docusate sodium (COLACE) 100 MG capsule   Yes No    Take 100 mg by mouth 2 (Two) Times a Day.    Elastic Bandages & Supports (JOBST  OPAQUE KNEE 20-30MMHG XL) misc   No No    1 application Daily.    ferrous sulfate 324 (65 Fe) MG tablet delayed-release EC tablet   Yes No    Take 324 mg by mouth 2 (Two) Times a Day With Meals.    insulin aspart (novoLOG) 100 UNIT/ML injection   Yes No    Inject  under the skin into the appropriate area as directed 3 (Three) Times a Day Before Meals. Sliding scale, low dose    isosorbide mononitrate (ISMO,MONOKET) 10 MG tablet   Yes No    Take 15 mg by mouth Daily.    levothyroxine (SYNTHROID, LEVOTHROID) 150 MCG tablet   Yes No    Take 150 mcg by mouth Daily.    lidocaine (LIDODERM) 5 %   Yes No    Place 1 patch on the skin as directed by provider Daily. Apply patch to left knee daily, on at 0700 am off at 2000    Multiple Vitamins-Minerals (MULTIVITAMIN WITH MINERALS) tablet tablet   Yes No    Take 1 tablet by mouth Daily.    nitroglycerin (NITROSTAT) 0.4 MG SL tablet   Yes No    Place 0.4 mg under the tongue Every 5 (Five) Minutes As Needed for Chest Pain. Take no more than 3 doses in 15 minutes.    Nutritional Supplements (PROTEIN SUPPLEMENT 80% PO)   Yes No    Take 30 mL by mouth 2 (Two) Times a Day.    pantoprazole (PROTONIX) 40 MG EC tablet   Yes No    Take 40 mg by mouth Daily.    polyethylene glycol (MIRALAX) packet   Yes No    Take 17 g by mouth 2 (Two) Times a Day.    RA VITAMIN C 500 MG tablet   Yes No    Take 500 mg by mouth Daily.    saccharomyces boulardii (FLORASTOR) 250 MG capsule   Yes No    Take 250 mg by mouth 2 (Two) Times a Day.    simvastatin (ZOCOR) 20 MG tablet   Yes No    Take 20 mg by mouth Every Night.    Zinc Sulfate 220 (50 Zn) MG tablet   Yes No    Take 1 tablet by mouth 2 (Two) Times a Day.             Hospital Scheduled Meds:      ammonium lactate 1 application Topical BID   apixaban 5 mg Oral Q12H   aspirin 81 mg Oral Daily   atorvastatin 10 mg Oral Daily   bisoprolol 10 mg Oral BID   ceftriaxone 1 g Intravenous Q24H   diltiaZEM  mg Oral Q24H   docusate sodium 100 mg Oral BID    ferrous sulfate 324 mg Oral BID With Meals   furosemide 40 mg Intravenous Once   guaiFENesin 600 mg Oral BID   insulin aspart 0-9 Units Subcutaneous 4x Daily AC & at Bedtime   ipratropium 0.5 mg Nebulization 4x Daily - RT   isosorbide mononitrate 15 mg Oral Daily   levothyroxine 150 mcg Oral Daily   lidocaine 1 patch Transdermal Q24H   multivitamin with minerals 1 tablet Oral Daily   pantoprazole 40 mg Oral Daily   polyethylene glycol 17 g Oral BID   saccharomyces boulardii 250 mg Oral BID   sodium chloride 3 mL Intravenous Q12H   ascorbic acid 500 mg Oral Daily   zinc sulfate 220 mg Oral BID             Vital Signs:    Temp:  [98.6 °F (37 °C)] 98.6 °F (37 °C)  Heart Rate:  [116-117] 117  Resp:  [18] 18  BP: (144-150)/() 144/97    There were no vitals filed for this visit.    There is no height or weight on file to calculate BMI.    Physical Exam:      General Appearance:    Alert, cooperative, in no acute distress, morbidly obese   Head:    Normocephalic, without obvious abnormality, atraumatic   Eyes:            Lids and lashes normal, conjunctivae and sclerae normal, no   icterus, no pallor, corneas clear, PERRLA   Ears:    Ears appear intact with no abnormalities noted   Throat:   No oral lesions, no thrush, oral mucosa moist   Neck:   No adenopathy, supple, trachea midline, no thyromegaly, no     carotid bruit, no JVD   Back:     No kyphosis present, no scoliosis present, no skin lesions,       erythema or scars, no tenderness to percussion or                   palpation,   range of motion normal   Lungs:     Clear to auscultation,respirations regular, even and                   unlabored    Heart:   Irregular rhythm with increased rate, no murmur, no gallop, no rub, no click   Breast Exam:    Deferred   Abdomen:     Normal bowel sounds, no masses, no organomegaly, soft        non-tender, non-distended, no guarding, no rebound                 tenderness   Genitalia:    Deferred   Extremities:   Moves  all extremities 4/5 strength, significant peripheral edema, no cyanosis, no   redness   Pulses:   Pulses palpable and equal bilaterally   Skin:   No bleeding, bruising or rash   Lymph nodes:   No palpable adenopathy   Neurologic:   Cranial nerves 2 - 12 grossly intact, sensation intact, DTR        present and equal bilaterally       EKG:      A flutter with RVR.  Diffuse inverted T waves.    Telemetry:      Flutter with RVR    I have personally looked at both the EKG and the telemetry strips.    Labs:    Results from last 7 days   Lab Units 08/13/19  1745   WBC K/uL 3.9*   HEMOGLOBIN g/dL 10.8*   HEMATOCRIT % 36.1*   MCV fL 84.3   MCHC g/dL 29.9*   PLATELETS K/uL 131*               Invalid input(s): PROTCrCl cannot be calculated (Patient's most recent lab result is older than the maximum 30 days allowed.).  No results found for: AMMONIA  Results from last 7 days   Lab Units 08/13/19  1745   TROPONIN I ng/mL <0.30         Lab Results   Component Value Date    HGBA1C 8.4 (H) 04/29/2019     Lab Results   Component Value Date    TSH 4.06 06/05/2019    FREET4 0.82 01/12/2019     No results found for: PREGTESTUR, PREGSERUM, HCG, HCGQUANT  Pain Management Panel     Pain Management Panel Latest Ref Rng & Units 4/30/2019 4/29/2019    CREATININE UR mg/dL 47.3 34.3                          Radiology:    Imaging Results (last 7 days)     ** No results found for the last 168 hours. **          Assessment:    1.  Refractory a flutter with RVR  2.  Acute bronchitis, present on admission, on Rocephin  3.  Mild CHF, diastolic  4.  Morbid obesity with pickwickian syndrome  5.  Pulmonary hypertension  6.  Chronic hypoxic respiratory failure on O2  7.  Diabetes mellitus type 2  8.  Essential hypertension  9.  Hypothyroidism  10.  Chronic thrombocytopenia  11.  Leukopenia  12.  Chronic kidney disease stage III    Plan:     We will consult Dr. Martinez  N.p.o. after midnight for cardioversion in the a.m. with SACHIN.  Consult Dr. Leone as  the patient is known to him.  Check lab work in the a.m.  Placed on insulin sliding scale.  Check procalcitonin.  Continue with Rocephin Atrovent, and guaifenesin for now.  Will give dose of Lasix 40 mg IV now.  Repeat chest x-ray in the a.m.  Continue with Eliquis for DVT and stroke prophylaxis.  Continue with other home medications.  Patient refuses PT and OT.  Further recommendations will depend on the clinical course.    Elmer Lopez DO  08/14/19  5:05 PM    Electronically signed by Elmer Lopez DO at 8/14/2019  5:21 PM          Physician Progress Notes (last 24 hours) (Notes from 8/15/2019 11:32 AM through 8/16/2019 11:32 AM)      Kevin Aceves MD at 8/16/2019  7:42 AM          [facility]-Cardiology Progress note     LOS: 2 days   Patient Care Team:  Marianela Albright APRN as PCP - General (Family Medicine)  Geremias Shepherd MD as Consulting Physician (General Surgery)  Lisa Cardoso MD as Surgeon (General Surgery)    Chief Complaint: Shortness of breath    Subjective:    Interval History: The patient underwent successful synchronized external cardioversion yesterday after a transesophageal echocardiogram showed no high risk features.  She was initially transiently mildly bradycardic but asymptomatic.  Her heart rate has gradually improved overnight with down titration of her medications.  Her transesophageal echocardiogram demonstrates that her ejection fraction has dropped from greater than 55% to 35%.  She has global hypokinesis consistent with tachycardia-mediated cardiomyopathy.  The patient indicates that she does not feel any better symptomatically after successful cardioversion.    Patient Complaints: Cough with inability to expectorate sputum  Patient Denies:   Chest pain, shortness of breath, orthopnea, peripheral edema, palpitations, sputum production, hemoptysis, abdominal pain, nausea, vomiting, diarrhea, fevers chills or night sweats  History taken from: patient    Review of  "Systems:   All systems were reviewed and negative       Objective:    Vital Sign Min/Max for last 24 hours  Temp  Min: 97.7 °F (36.5 °C)  Max: 98.2 °F (36.8 °C)   BP  Min: 96/52  Max: 134/76   Pulse  Min: 36  Max: 109   Resp  Min: 16  Max: 20   SpO2  Min: 91 %  Max: 100 %   No Data Recorded   Weight  Min: 143 kg (315 lb 3.2 oz)  Max: 143 kg (316 lb 4.8 oz)     Flowsheet Rows      First Filed Value   Admission Height  170.2 cm (67\") Documented at 08/14/2019 1725   Admission Weight  144 kg (316 lb 9.6 oz)  (Abnormal)  Documented at 08/14/2019 1659          Physical Exam:     General Appearance:    Alert, cooperative, in no acute distress   Head:    Normocephalic, without obvious abnormality, atraumatic   Eyes:            Lids and lashes normal, conjunctivae and sclerae normal, no   icterus, no pallor, corneas clear, PERRLA   Ears:    Ears appear intact with no abnormalities noted   Throat:   No oral lesions, no thrush, oral mucosa moist   Neck:   No adenopathy, supple, trachea midline, no thyromegaly, no     carotid bruit, no JVD   Back:     No kyphosis present, no scoliosis present, no skin lesions,       erythema or scars, no tenderness to percussion or                   palpation,   range of motion normal   Lungs:     Clear to auscultation,respirations regular, even and                   unlabored    Heart:    Regular rhythm and normal rate, normal S1 and S2, no            murmur, no gallop, no rub, no click   Breast Exam:    Deferred   Abdomen:     Normal bowel sounds, no masses, no organomegaly, soft        non-tender, non-distended, no guarding, no rebound                 tenderness   Genitalia:    Deferred   Extremities:   Moves all extremities well, (+) edema, no cyanosis, no              redness   Pulses:   Pulses palpable and equal bilaterally   Skin:   No bleeding, bruising or rash   Lymph nodes:   No palpable adenopathy   Neurologic:   Cranial nerves 2 - 12 grossly intact, sensation intact, DTR        " present and equal bilaterally        Results Review:     I reviewed the patient's new clinical results.      Results from last 7 days   Lab Units 08/16/19  0607   SODIUM mmol/L 141   POTASSIUM mmol/L 4.5   CHLORIDE mmol/L 106   CO2 mmol/L 24.4   BUN mg/dL 37*   CREATININE mg/dL 2.11*   GLUCOSE mg/dL 176*   CALCIUM mg/dL 9.0     Results from last 7 days   Lab Units 08/16/19  0606 08/15/19  0454 08/13/19  1745   WBC 10*3/mm3 6.28 4.60 3.9*   HEMOGLOBIN g/dL 10.1* 9.7* 10.8*   HEMATOCRIT % 34.0 32.2* 36.1*   PLATELETS 10*3/mm3 145 134* 131*         Echo EF Estimated  ECHOEFEST: 35%      Physical Exam    Medication Review: yes    Assessment/Plan:      Hypothyroidism    Diabetes mellitus (CMS/[facility])    Anemia due to stage 4 chronic kidney disease (CMS/[facility])    Chronic Mixed Systolic\Diastolic congestive heart failure (CMS/[facility])- HFrEF    Paroxysmal atrial fibrillation (CMS/[facility])    Cor pulmonale, chronic (CMS/[facility])    Lymphedema of both lower extremities    Morbid obesity with BMI of 45.0-49.9, adult (CMS/[facility])    A-fib (CMS/[facility])    CKD (chronic kidney disease) stage 3, GFR 30-59 ml/min (CMS/H[facility])    Acute bronchitis due to other specified organisms      Status post SACHIN guided synchronized external cardioversion yesterday.  The patient achieved return of normal sinus rhythm and was initially mildly bradycardic.  Her heart rate has increased to the mid 50s-lower 60s.  I have recommended decreasing her amiodarone to 100 mg orally once per day.  I have recommended discontinuation of long-acting oral diltiazem.  I have decreased her Bystolic dose to 10 mg orally once per day.  I will hold off on initiating low-dose digoxin until her heart rate improves further.  We will start low-dose Spironolactone.  If her blood pressure will tolerate later in the course of her hospitalization we will start low-dose ACE inhibitor therapy.  By transesophageal echocardiogram, her left ventricular ejection fraction has decreased from greater than 55%  two months ago to 35-40% with diffuse hypokinesis consistent with long-standing poorly controlled atrial fibrillation.  I think this is a tachycardia-mediated transient cardiomyopathy which should improve over time with maintenance of normal sinus rhythm and heart rate control.  We will reassess by echocardiogram in 2-3 months as an outpatient.  I think some of her cough\congestion may be related to HFrEF.  In addition to starting low-dose Spironolactone I will also start oral diuretic therapy with Demadex 40 mg once in the morning.  We will ask for the dietitian to see the patient to discuss fluid and sodium restriction.  I am recommending a 1.5 L/day fluid restriction and 1200 mg/day of sodium restriction.        Kevin Aceves MD  19  7:42 AM          Electronically signed by Kevin Aceves MD at 2019  7:50 AM     Elmer Lopez DO at 8/15/2019 11:57 AM                [facility]    Name:  Millicent Mckeon   Age:  59 y.o.  Sex:  female  :  1960  MRN:  [id number]   Visit Number:  [id number]  Admission Date:  2019  Date Of Service:  08/15/19  Primary Care Physician:  Marianela Albright APRN     LOS: 1 day :  Patient Care Team:  Marianela Albright APRN as PCP - General (Family Medicine)  Geremias Shepherd MD as Consulting Physician (General Surgery)  Lisa Cardoso MD as Surgeon (General Surgery):    History taken from:     patient    Chief Complaint:      Arrhythmia    Subjective:    Interval History:     Patient seen and examined again today.    Patient is a 59-year-old  female with history of a flutter, essential hypertension, morbid obesity, hypothyroidism, diabetes mellitus type 2 who is transferred from [facility] at the request of Dr. Aceves due to persistent a flutter.  She has been tried on diltiazem, beta-blocker, dig, amiodarone without relief.  She is on Eliquis.   She is asymptomatic with this.  She  does have mild congestive heart failure on her chest x-ray.  Her breathing did see the patient and noted she had converted, so decided against SACHIN with cardioversion at this point.     Patient states she has had a productive cough with yellow sputum, decreased appetite, and fever and chills.  She has been at a nursing home in the past 6 months, and respiratory symptoms are why she was sent in to the hospital.  Her troponin was negative over there.  They have been negative here as well. WBC count was actually 3.9.  She was placed on Rocephin, she states she feels better.  She also follows with Dr. Leone, her creatinine is 1.85 today.     He is following at present.     She currently denies any pain.  She denies any chest pressure, shortness of breath, nausea or vomiting.  Her cough is improved.  She has not ambulated for at least 6 months.  She has significant peripheral edema of her legs.  Her weight is well over 300 pounds.    She refuses PT and OT evaluation here.  Reviewed echocardiogram from 1/2019 which showed preserved ejection fraction with pulmonary hypertension.            Review of Systems:     All systems were reviewed and negative except for:  Musculoskeletal: positive for  muscle weakness    Objective:    Vital Signs:    Temp:  [97.7 °F (36.5 °C)-98.6 °F (37 °C)] 98.2 °F (36.8 °C)  Heart Rate:  [] 109  Resp:  [17-24] 18  BP: (114-176)/() 134/76    Physical Exam:      General Appearance:    Alert, cooperative, in no acute distress   Head:    Normocephalic, without obvious abnormality, atraumatic   Eyes:            Lids and lashes normal, conjunctivae and sclerae normal, no   icterus, no pallor, corneas clear, PERRLA   Ears:    Ears appear intact with no abnormalities noted   Throat:   No oral lesions, no thrush, oral mucosa moist   Neck:   No adenopathy, supple, trachea midline, no thyromegaly, no     carotid bruit, no JVD   Back:     No kyphosis present, no scoliosis present, no skin  lesions,       erythema or scars, no tenderness to percussion or                   palpation,   range of motion normal   Lungs:     Clear to auscultation,respirations regular, even and                   unlabored    Heart:   Irregular rhythm and normal rate,  no   murmur, no gallop, no rub, no click   Breast Exam:    Deferred   Abdomen:     Normal bowel sounds, no masses, no organomegaly, soft        non-tender, non-distended, no guarding, no rebound                 tenderness   Genitalia:    Deferred   Extremities:   Moves all extremities well, no edema, no cyanosis, no              redness   Pulses:   Pulses palpable and equal bilaterally   Skin:   No bleeding, bruising or rash   Lymph nodes:   No palpable adenopathy   Neurologic:   Cranial nerves 2 - 12 grossly intact, sensation intact, DTR        present and equal bilaterally        Results Review:      I reviewed the patient's new clinical results.    Labs:    Lab Results (last 24 hours)     Procedure Component Value Units Date/Time    VRE Culture - Swab, Per Rectum [[id number]]  (Normal) Collected:  08/14/19 1636    Specimen:  Swab from Per Rectum Updated:  08/15/19 1143     VRE SCREEN CX No Vancomycin Resistant Enterococcus Isolated    Acinetobacter Screen - Swab, Arm, Left [[id number]]  (Normal) Collected:  08/14/19 1636    Specimen:  Swab from Arm, Left Updated:  08/15/19 1142     ACINETOBACTER SCREEN CX No Acinetobacter isolated    MRSA Screen Culture - Swab, Nares [[id number]]  (Normal) Collected:  08/14/19 1636    Specimen:  Swab from Nares Updated:  08/15/19 1142     MRSA SCREEN CX No Methicillin Resistant Staphylococcus aureus isolated    POC Glucose Once [[id number]]  (Abnormal) Collected:  08/15/19 1047    Specimen:  Blood Updated:  08/15/19 1055     Glucose 250 mg/dL      Comment: Serial Number: [id number]:  [id number]       Respiratory Culture - Sputum, Cough [[id number]] Collected:  08/15/19 0725    Specimen:  Sputum from Cough Updated:  08/15/19  0821     Respiratory Culture Rejected    Narrative:       Specimen rejected due to microscopic exam of gram stain.    POC Glucose Once [[id number]]  (Abnormal) Collected:  08/15/19 0615    Specimen:  Blood Updated:  08/15/19 0621     Glucose 212 mg/dL      Comment: Serial Number: [id number]:  [id number]       Troponin [[id number]]  (Normal) Collected:  08/15/19 0454    Specimen:  Blood Updated:  08/15/19 0609     Troponin T <0.010 ng/mL     Narrative:       Troponin T Reference Range:  <= 0.03 ng/mL-   Negative for AMI  >0.03 ng/mL-     Abnormal for myocardial necrosis.  Clinicians would have to utilize clinical acumen, EKG, Troponin and serial changes to determine if it is an Acute Myocardial Infarction or myocardial injury due to an underlying chronic condition.     TSH [[id number]]  (Normal) Collected:  08/15/19 0454    Specimen:  Blood Updated:  08/15/19 0609     TSH 4.200 mIU/mL     Comprehensive Metabolic Panel [[id number]]  (Abnormal) Collected:  08/15/19 0454    Specimen:  Blood Updated:  08/15/19 0607     Glucose 238 mg/dL      Comment: Glucose >180, Hemoglobin A1C recommended.        BUN 30 mg/dL      Creatinine 1.85 mg/dL      Sodium 141 mmol/L      Potassium 4.5 mmol/L      Chloride 106 mmol/L      CO2 23.3 mmol/L      Calcium 9.0 mg/dL      Total Protein 6.7 g/dL      Albumin 3.60 g/dL      ALT (SGPT) 11 U/L      AST (SGOT) 15 U/L      Alkaline Phosphatase 152 U/L      Total Bilirubin 0.3 mg/dL      eGFR Non African Amer 28 mL/min/1.73      eGFR  African Amer 34 mL/min/1.73      Globulin 3.1 gm/dL      A/G Ratio 1.2 g/dL      BUN/Creatinine Ratio 16.2     Anion Gap 11.7 mmol/L     Narrative:       GFR Normal >60  Chronic Kidney Disease <60  Kidney Failure <15    CBC Auto Differential [[id number]]  (Abnormal) Collected:  08/15/19 0454    Specimen:  Blood Updated:  08/15/19 0605     WBC 4.60 10*3/mm3      RBC 3.97 10*6/mm3      Hemoglobin 9.7 g/dL      Hematocrit 32.2 %      MCV 81.1 fL      MCH 24.4 pg       MCHC 30.1 g/dL      RDW 20.4 %      RDW-SD 59.8 fl      MPV 10.2 fL      Platelets 134 10*3/mm3      Neutrophil % 70.5 %      Lymphocyte % 17.2 %      Monocyte % 11.5 %      Eosinophil % 0.2 %      Basophil % 0.4 %      Immature Grans % 0.2 %      Neutrophils, Absolute 3.24 10*3/mm3      Lymphocytes, Absolute 0.79 10*3/mm3      Monocytes, Absolute 0.53 10*3/mm3      Eosinophils, Absolute 0.01 10*3/mm3      Basophils, Absolute 0.02 10*3/mm3      Immature Grans, Absolute 0.01 10*3/mm3      nRBC 0.0 /100 WBC     Scan Slide [[id number]] Collected:  08/15/19 0454    Specimen:  Blood Updated:  08/15/19 0605     Anisocytosis Mod/2+     Hypochromia Slight/1+     Poikilocytes Slight/1+     WBC Morphology Normal     Platelet Estimate Adequate    POC Glucose Once [[id number]]  (Abnormal) Collected:  08/15/19 0001    Specimen:  Blood Updated:  08/15/19 0015     Glucose 246 mg/dL      Comment: Serial Number: [id number]:  [id number]       Troponin [[id number]]  (Normal) Collected:  08/14/19 2317    Specimen:  Blood Updated:  08/14/19 2346     Troponin T <0.010 ng/mL     Narrative:       Troponin T Reference Range:  <= 0.03 ng/mL-   Negative for AMI  >0.03 ng/mL-     Abnormal for myocardial necrosis.  Clinicians would have to utilize clinical acumen, EKG, Troponin and serial changes to determine if it is an Acute Myocardial Infarction or myocardial injury due to an underlying chronic condition.     POC Glucose Once [[id number]]  (Abnormal) Collected:  08/14/19 2023    Specimen:  Blood Updated:  08/14/19 2036     Glucose 420 mg/dL      Comment: Serial Number: [id number]:  [id number]       Troponin [[id number]]  (Normal) Collected:  08/14/19 1743    Specimen:  Blood from Arm, Right Updated:  08/14/19 1829     Troponin T <0.010 ng/mL     Narrative:       Troponin T Reference Range:  <= 0.03 ng/mL-   Negative for AMI  >0.03 ng/mL-     Abnormal for myocardial necrosis.  Clinicians would have to utilize clinical acumen, EKG,  Troponin and serial changes to determine if it is an Acute Myocardial Infarction or myocardial injury due to an underlying chronic condition.     Procalcitonin [[id number]]  (Normal) Collected:  08/14/19 1743    Specimen:  Blood from Arm, Right Updated:  08/14/19 1829     Procalcitonin 0.16 ng/mL     Narrative:       As a Marker for Sepsis (Non-Neonates):   1. <0.5 ng/mL represents a low risk of severe sepsis and/or septic shock.  2. >2 ng/mL represents a high risk of severe sepsis and/or septic shock.    As a Marker for Lower Respiratory Tract Infections that require antibiotic therapy:    PCT on Admission     Antibiotic Therapy       6-12 Hrs later  > 0.5                Strongly Recommended             >0.25 - <0.5         Recommended  0.1 - 0.25           Discouraged              Remeasure/reassess PCT  <0.1                 Strongly Discouraged     Remeasure/reassess PCT                     PCT values of < 0.5 ng/mL do not exclude an infection, because localized infections (without systemic signs) may be associated with such low concentrations, or a systemic infection in its initial stages (< 6 hours). Furthermore, increased PCT can occur without infection. PCT concentrations between 0.5 and 2.0 ng/mL should be interpreted taking into account the patient's history. It is recommended to retest PCT within 6-24 hours if any concentrations < 2 ng/mL are obtained.           Radiology:    Imaging Results (last 24 hours)     Procedure Component Value Units Date/Time    XR Chest 1 View [[id number]] Collected:  08/15/19 0906     Updated:  08/15/19 0909    Narrative:       PROCEDURE: XR CHEST 1 VW-     HISTORY: DYSPNEA CHRONIC, NO XRAY     COMPARISON: 01/14/2019.     FINDINGS: The heart is enlarged. The mediastinum is unremarkable. There  are linear opacities in the right midlung field and right lung base  likely atelectasis. There is no pneumothorax.  There are no acute  osseous abnormalities.       Impression:       Linear  opacities in the right midlung field and right lung  base likely atelectasis.     Continued followup is recommended.     This report was finalized on 8/15/2019 9:06 AM by Lety Valiente M.D..          Medication Review:       amiodarone 200 mg Oral BID With Meals   ammonium lactate 1 application Topical BID   apixaban 5 mg Oral Q12H   aspirin 81 mg Oral Daily   atorvastatin 10 mg Oral Nightly   bisoprolol 20 mg Oral Q24H   ceftriaxone 1 g Intravenous Q24H   diltiaZEM  mg Oral Q24H   docusate sodium 100 mg Oral BID   ferrous sulfate 324 mg Oral BID With Meals   guaiFENesin 600 mg Oral BID   insulin aspart 0-9 Units Subcutaneous 4x Daily AC & at Bedtime   ipratropium 0.5 mg Nebulization 4x Daily - RT   isosorbide mononitrate 15 mg Oral Daily   levothyroxine 150 mcg Oral Q AM   lidocaine 1 patch Transdermal Q24H   multivitamin with minerals 1 tablet Oral Daily   pantoprazole 40 mg Oral Daily   polyethylene glycol 17 g Oral BID   saccharomyces boulardii 250 mg Oral BID   sodium chloride 3 mL Intravenous Q12H   ascorbic acid 500 mg Oral Daily   zinc sulfate 220 mg Oral BID            Assessment:    Problem List Items Addressed This Visit     None          1.     Paroxysmal a flutter, currently rate controlled  2.  Acute bronchitis, present on admission, on Rocephin  3.  Mild CHF, diastolic  4.  Morbid obesity with pickwickian syndrome  5.  Pulmonary hypertension  6.  Chronic hypoxic respiratory failure on O2  7.  Diabetes mellitus type 2  8.  Essential hypertension  9.  Hypothyroidism  10.  Chronic thrombocytopenia  11.  Leukopenia  12.  Chronic kidney disease stage III  13.  Significant peripheral edema.        Plan:    Patient has been moved out of the intensive care unit.  She had converted to sinus at one point, she is currently in a flutter with rate controlled.  Continue with Rocephin at the present time.  Dr. Aceves is not going to cardiovert at this time, will continue to monitor.  Continue with  "Eliquis for DVT and stroke prophylaxis.  Dr. Leone is doing aggressive diuresis at this point given her significant edema.  If patient is stable by tomorrow could be moved back to [facility] and rehab.  Check lab work in the a.m.  Further recommendations will depend on the clinical course.    Elmer Lopez DO  08/15/19  11:57 AM            Electronically signed by Elmer Lopez DO at 8/15/2019 12:04 PM     Kevin Aceves MD at 8/15/2019 11:34 AM          [facility] Progress note     LOS: 1 day   Patient Care Team:  Marianela Albright APRN as PCP - General (Family Medicine)  Geremias Shepherd MD as Consulting Physician (General Surgery)  Lisa Cardoso MD as Surgeon (General Surgery)    Chief Complaint: Shortness of breath    Subjective:    Interval History:     Patient Complaints: none  Patient Denies:   Chest pain, shortness of breath, orthopnea, peripheral edema, palpitations, cough, sputum production, hemoptysis, abdominal pain, nausea, vomiting, diarrhea, fevers chills or night sweats  History taken from: patient RN    Review of Systems:   All systems were reviewed and negative       Objective:    Vital Sign Min/Max for last 24 hours  Temp  Min: 97.7 °F (36.5 °C)  Max: 98.6 °F (37 °C)   BP  Min: 114/83  Max: 176/119   Pulse  Min: 93  Max: 121   Resp  Min: 17  Max: 24   SpO2  Min: 87 %  Max: 96 %   No Data Recorded   Weight  Min: 143 kg (315 lb 3.2 oz)  Max: 144 kg (316 lb 9.6 oz)     Flowsheet Rows      First Filed Value   Admission Height  170.2 cm (67\") Documented at 08/14/2019 1725   Admission Weight  144 kg (316 lb 9.6 oz)  (Abnormal)  Documented at 08/14/2019 1659          Physical Exam:     General Appearance:    Alert, cooperative, in no acute distress   Head:    Normocephalic, without obvious abnormality, atraumatic   Eyes:            Lids and lashes normal, conjunctivae and sclerae normal, no   icterus, no pallor, corneas clear, PERRLA   Ears:    Ears appear intact with no " abnormalities noted   Throat:   No oral lesions, no thrush, oral mucosa moist   Neck:   No adenopathy, supple, trachea midline, no thyromegaly, no     carotid bruit, no JVD   Back:     No kyphosis present, no scoliosis present, no skin lesions,       erythema or scars, no tenderness to percussion or                   palpation,   range of motion normal   Lungs:     Clear to auscultation,respirations regular, even and                   unlabored    Heart:    Regular rhythm and normal rate, normal S1 and S2, no            murmur, no gallop, no rub, no click   Breast Exam:    Deferred   Abdomen:     Normal bowel sounds, no masses, no organomegaly, soft        non-tender, non-distended, no guarding, no rebound                 tenderness   Genitalia:    Deferred   Extremities:   Moves all extremities well, (+) edema, no cyanosis, no              redness   Pulses:   Pulses palpable and equal bilaterally   Skin:   No bleeding, bruising or rash   Lymph nodes:   No palpable adenopathy   Neurologic:   Cranial nerves 2 - 12 grossly intact, sensation intact, DTR        present and equal bilaterally        Results Review:     I reviewed the patient's new clinical results.      Results from last 7 days   Lab Units 08/15/19  0454   SODIUM mmol/L 141   POTASSIUM mmol/L 4.5   CHLORIDE mmol/L 106   CO2 mmol/L 23.3   BUN mg/dL 30*   CREATININE mg/dL 1.85*   GLUCOSE mg/dL 238*   CALCIUM mg/dL 9.0     Results from last 7 days   Lab Units 08/15/19  0454 08/13/19  1745   WBC 10*3/mm3 4.60 3.9*   HEMOGLOBIN g/dL 9.7* 10.8*   HEMATOCRIT % 32.2* 36.1*   PLATELETS 10*3/mm3 134* 131*         Physical Exam    Medication Review: yes    Assessment/Plan:      Hypothyroidism    Diabetes mellitus (CMS/HCC)    Anemia due to stage 4 chronic kidney disease (CMS/HCC)    Chronic diastolic congestive heart failure (CMS/HCC)    Paroxysmal atrial fibrillation (CMS/HCC)    Cor pulmonale, chronic (CMS/HCC)    Lymphedema of both lower extremities    Morbid  obesity with BMI of 45.0-49.9, adult (CMS/[facility])    A-fib (CMS/[facility])    CKD (chronic kidney disease) stage 3, GFR 30-59 ml/min (CMS/[facility])    Acute bronchitis due to other specified organisms      With further heart rate control, it is clear that the patient continues in a typical type I atrial flutter.  I have recommended to proceed with SACHIN guided synchronized external cardioversion later this afternoon.  Further recommendations will be predicated on the results of that testing.        Kevin Aceves MD  08/15/19  11:34 AM          Electronically signed by Kevin Aceves MD at 8/15/2019 11:37 AM       Consult Notes (last 24 hours) (Notes from 8/15/2019 11:32 AM through 8/16/2019 11:32 AM)     No notes of this type exist for this encounter.

## 2019-08-16 NOTE — PROGRESS NOTES
Discharge Planning Assessment  [facility]     Patient Name: Millicent Mckeon  MRN: [id number]  Today's Date: 8/16/2019    Admit Date: 8/14/2019    Discharge Needs Assessment    No documentation.       Discharge Plan     Row Name 08/16/19 1144       Plan    Plan Comments  Called Jolley at [facility] she may return there when  discharged form the hospital call report to [phone] fax DC summary to [phone] CM to call Carla when DC  Faxed H/P and notes to [facility]         Destination      Service Provider Request Status Selected Services Address Phone Number Fax Number    [facility] Pending - Request Sent N/A [address], [place] [id number] [phone]      Durable Medical Equipment      No service coordination in this encounter.      Dialysis/Infusion      No service coordination in this encounter.      Home Medical Care      No service coordination in this encounter.      Therapy      No service coordination in this encounter.      Community Resources      No service coordination in this encounter.        Expected Discharge Date and Time     Expected Discharge Date Expected Discharge Time    Aug 19, 2019         Demographic Summary    No documentation.       Functional Status    No documentation.       Psychosocial    No documentation.       Abuse/Neglect    No documentation.       Legal    No documentation.       Substance Abuse    No documentation.       Patient Forms    No documentation.           Azalia Mcfadden RN

## 2019-08-16 NOTE — PLAN OF CARE
Problem: Patient Care Overview  Goal: Plan of Care Review  Outcome: Ongoing (interventions implemented as appropriate)   08/15/19 1814 08/16/19 0444   Coping/Psychosocial   Plan of Care Reviewed With --  patient   Plan of Care Review   Progress --  improving   OTHER   Outcome Summary SACHIN + cardioversion successful. SB att. SACHIN negative. A+O x 4. Patient almost total care as she requires max assist to roll in bed and does not get up. Refuses PT.  --      Goal: Individualization and Mutuality  Outcome: Ongoing (interventions implemented as appropriate)    Goal: Discharge Needs Assessment  Outcome: Ongoing (interventions implemented as appropriate)      Problem: Fall Risk (Adult)  Goal: Absence of Fall  Outcome: Ongoing (interventions implemented as appropriate)      Problem: Skin Injury Risk (Adult)  Goal: Skin Health and Integrity  Outcome: Ongoing (interventions implemented as appropriate)      Problem: Pain, Chronic (Adult)  Goal: Acceptable Pain/Comfort Level and Functional Ability  Outcome: Ongoing (interventions implemented as appropriate)      Problem: Arrhythmia/Dysrhythmia (Symptomatic) (Adult)  Goal: Signs and Symptoms of Listed Potential Problems Will be Absent, Minimized or Managed (Arrhythmia/Dysrhythmia)  Outcome: Ongoing (interventions implemented as appropriate)

## 2019-08-16 NOTE — PROGRESS NOTES
"Nephrology Progress Note.    LOS: 2 days    Patient Care Team:  Marianela Albright APRN as PCP - General (Family Medicine)  Geremias Shepherd MD as Consulting Physician (General Surgery)  Lisa Cardoso MD as Surgeon (General Surgery)    Chief Complaint:  No chief complaint on file.      Subjective:     Follow up for FREDDY and Chronic Kidney disease stage 4 / Anemia.   Interval History:   Patient Complaints: none  Patient seen and examined this morning.  Events from last night noted.  Patient denies having any fevers chills.  No nausea or vomiting no abdominal pain.  Denies any chest pain, she does have some shortness of breath but denies any cough and sputum production.  There is significant edema.   Patient also denies having new onset weakness of numbness of either extremity.  Status post cardioversion this morning and things are slightly better.  History taken from: patient  Objective:    Vital Signs  /77 (BP Location: Right arm, Patient Position: Lying)   Pulse 52   Temp 97.9 °F (36.6 °C) (Oral)   Resp 18   Ht 170.2 cm (67\")   Wt (!) 143 kg (316 lb 4.8 oz)   SpO2 93%   BMI 49.54 kg/m²       I/O this shift:  In: 240 [P.O.:240]  Out: -     Intake/Output Summary (Last 24 hours) at 8/16/2019 0929  Last data filed at 8/16/2019 0903  Gross per 24 hour   Intake 316.53 ml   Output 600 ml   Net -283.47 ml       Physical Exam:  General Appearance: alert, oriented x 3, no acute distress,   HEENT: Oral mucosa dry, extra occular movements intact. Sclera clear.  Skin: Warm and dry  Neck: supple, no JVD, trachea midline  Lungs:Chest shape is normal. Breath sounds heard bilaterally equally. No crackles, No wheezing.   Heart: regular rate and rhythm. normal S1 and S2, no S3, no rub, peripheral pulses weak but palpable.  Abdomen: Obese, soft, non-tender,  present bowel sounds to auscultation  : no palpable bladder.  Extremities: 2+ edema, no cyanosis or clubbing.   Neuro: normal speech and mental status, grossly non " focal.     Results Review:    Results from last 7 days   Lab Units 08/16/19  0607 08/15/19  0454   SODIUM mmol/L 141 141   POTASSIUM mmol/L 4.5 4.5   CHLORIDE mmol/L 106 106   CO2 mmol/L 24.4 23.3   BUN mg/dL 37* 30*   CREATININE mg/dL 2.11* 1.85*   CALCIUM mg/dL 9.0 9.0   BILIRUBIN mg/dL  --  0.3   ALK PHOS U/L  --  152*   ALT (SGPT) U/L  --  11   AST (SGOT) U/L  --  15   GLUCOSE mg/dL 176* 238*       Estimated Creatinine Clearance: 41.6 mL/min (A) (by C-G formula based on SCr of 2.11 mg/dL (H)).    Results from last 7 days   Lab Units 08/16/19  0607   MAGNESIUM mg/dL 1.8   PHOSPHORUS mg/dL 3.4             Results from last 7 days   Lab Units 08/16/19  0606 08/15/19  0454 08/13/19  1745   WBC 10*3/mm3 6.28 4.60 3.9*   HEMOGLOBIN g/dL 10.1* 9.7* 10.8*   PLATELETS 10*3/mm3 145 134* 131*               Imaging Results (last 24 hours)     ** No results found for the last 24 hours. **          amiodarone 100 mg Oral Q24H   ammonium lactate 1 application Topical BID   apixaban 5 mg Oral Q12H   aspirin 81 mg Oral Daily   atorvastatin 10 mg Oral Nightly   bisoprolol 10 mg Oral Q24H   ceftriaxone 1 g Intravenous Q24H   docusate sodium 100 mg Oral BID   ferrous sulfate 324 mg Oral BID With Meals   guaiFENesin 600 mg Oral BID   insulin aspart 0-9 Units Subcutaneous 4x Daily AC & at Bedtime   ipratropium 0.5 mg Nebulization 4x Daily - RT   isosorbide mononitrate 15 mg Oral Daily   levothyroxine 150 mcg Oral Q AM   lidocaine 1 patch Transdermal Q24H   multivitamin with minerals 1 tablet Oral Daily   pantoprazole 40 mg Oral Daily   polyethylene glycol 17 g Oral BID   saccharomyces boulardii 250 mg Oral BID   sodium chloride 3 mL Intravenous Q12H   spironolactone 25 mg Oral Daily   torsemide 40 mg Oral Daily   ascorbic acid 500 mg Oral Daily   zinc sulfate 220 mg Oral BID          Medication Review:   Current Facility-Administered Medications   Medication Dose Route Frequency Provider Last Rate Last Dose   • acetaminophen  (TYLENOL) tablet 650 mg  650 mg Oral Q4H PRN Elmer Lopez, DO       • amiodarone (PACERONE) tablet 100 mg  100 mg Oral Q24H Kevin Aceves MD   100 mg at 08/16/19 0911   • ammonium lactate (AMLACTIN) 12 % cream 1 application  1 application Topical BID Elmer Lopez DO   1 application at 08/15/19 2129   • apixaban (ELIQUIS) tablet 5 mg  5 mg Oral Q12H Elmer Lopez DO   5 mg at 08/16/19 0912   • aspirin chewable tablet 81 mg  81 mg Oral Daily Elmer Lopez DO   81 mg at 08/16/19 0911   • atorvastatin (LIPITOR) tablet 10 mg  10 mg Oral Nightly Elmer Lopez DO   10 mg at 08/15/19 2122   • bisacodyl (DULCOLAX) suppository 10 mg  10 mg Rectal Daily PRN Elmer Lopez, DO       • bisoprolol (ZEBeta) tablet 10 mg  10 mg Oral Q24H Kevin Aceves MD   10 mg at 08/16/19 0912   • cefTRIAXone (ROCEPHIN) IVPB 1 g/50ml dextrose (premix)  1 g Intravenous Q24H Elmer Lopez  mL/hr at 08/15/19 1246 1 g at 08/15/19 1246   • dextrose (D50W) 25 g/ 50mL Intravenous Solution 25 g  25 g Intravenous Q15 Min PRN Elmer Lopez, DO       • dextrose (GLUTOSE) oral gel 1 tube  1 tube Oral Q15 Min PRN Elmer Lopez, DO       • docusate sodium (COLACE) capsule 100 mg  100 mg Oral BID Elmer Lopez DO   100 mg at 08/16/19 0912   • ferrous sulfate EC tablet 324 mg  324 mg Oral BID With Meals Elmer Lopez DO   324 mg at 08/16/19 0911   • glucagon (human recombinant) (GLUCAGEN DIAGNOSTIC) injection 1 mg  1 mg Subcutaneous PRN Elmer Lopez, DO       • guaiFENesin (MUCINEX) 12 hr tablet 600 mg  600 mg Oral BID Elmer Lopez DO   600 mg at 08/16/19 0911   • insulin aspart (novoLOG) injection 0-9 Units  0-9 Units Subcutaneous 4x Daily AC & at Bedtime Elmer Lopez,    2 Units at 08/16/19 0648   • ipratropium (ATROVENT) nebulizer solution 0.5 mg  0.5 mg Nebulization 4x Daily - RT Elmer Lopez,    0.5 mg at 08/16/19 0709   • isosorbide  mononitrate (IMDUR) 24 hr tablet 15 mg  15 mg Oral Daily Elmer Lopez, DO   15 mg at 08/16/19 0912   • levothyroxine (SYNTHROID, LEVOTHROID) tablet 150 mcg  150 mcg Oral Q AM Elemr Lopez, DO   150 mcg at 08/16/19 0648   • lidocaine (LIDODERM) 5 % 1 patch  1 patch Transdermal Q24H Elmer Lopez, DO   1 patch at 08/15/19 1830   • metoprolol tartrate (LOPRESSOR) injection 5 mg  5 mg Intravenous Q4H PRN Elmer Lopez, DO   5 mg at 08/15/19 0901   • multivitamin with minerals 1 tablet  1 tablet Oral Daily Elmer Lopez, DO   1 tablet at 08/16/19 0911   • nitroglycerin (NITROSTAT) SL tablet 0.4 mg  0.4 mg Sublingual Q5 Min PRN Elmer Lopez, DO       • ondansetron (ZOFRAN) injection 4 mg  4 mg Intravenous Q6H PRN Elmer Lopez, DO       • pantoprazole (PROTONIX) EC tablet 40 mg  40 mg Oral Daily Elmer Lopez, DO   40 mg at 08/16/19 0911   • polyethylene glycol (MIRALAX) powder 17 g  17 g Oral BID Elmer Lopez DO   17 g at 08/16/19 0911   • saccharomyces boulardii (FLORASTOR) capsule 250 mg  250 mg Oral BID Elmer Lopez, DO   250 mg at 08/16/19 0911   • sodium chloride 0.9 % flush 3 mL  3 mL Intravenous Q12H Elmer Lopez DO   3 mL at 08/16/19 0913   • sodium chloride 0.9 % flush 3-10 mL  3-10 mL Intravenous PRN Elmer Lopez, DO       • spironolactone (ALDACTONE) tablet 25 mg  25 mg Oral Daily Milad Leone MD, FASNADIR   25 mg at 08/16/19 0912   • torsemide (DEMADEX) tablet 40 mg  40 mg Oral Daily Kevin Aceves MD   40 mg at 08/16/19 0912   • vitamin C (ASCORBIC ACID) tablet 500 mg  500 mg Oral Daily Elmer Lopez, DO   500 mg at 08/16/19 0912   • zinc sulfate (ZINCATE) capsule 220 mg  220 mg Oral BID Elmer Lopez DO   220 mg at 08/16/19 0912       Assessment/Plan:       1.   Acute kidney injury  2.   CKD (chronic kidney disease) stage 4, GFR 15-29 ml/min (CMS/[facility])  3.   Anemia due to stage 4 chronic kidney disease  (CMS/[facility])  4.   Refractory atrial flutter with RVR  5.   Acute bronchitis present on admission  6.   Chronic diastolic congestive heart failure (CMS/HCC)  7.   Hypothyroidism  8.   Diabetes mellitus (CMS/HCC)  9.   Paroxysmal atrial fibrillation (CMS/HCC)  10. Cor pulmonale, chronic (CMS/[facility])  11. Lymphedema of both lower extremities  12. Morbid obesity with BMI of 45.0-49.9, adult (CMS/[facility])     Plan:  · Patient cardioverted this morning.  She also had multiple medication changes made by Dr. Aceves.  · She still has significant amount of edema continue with the aggressive diuresis.  She was started on oral torsemide and can be continued.  · Awaiting 24-hour urine for protein and creatinine clearance, she has fairly low muscle mass and may not have much creatinine clearance.  · Her sister is already on dialysis she may end up requiring dialysis this admission or near future.  · If renal function continues to worsen she may end up needing Eliquis adjusted to 2.5 mg twice a day.  · Details were discussed with the patient as well as family in the room.    · Details were also discussed with the hospitalist service.   · Surveillance labs.  · Further recommendations will depend on clinical course of the patient during the current hospitalization.    · I also discussed the details with the nursing staff.  · Rest as ordered.    Milad Leone MD, GILA  08/16/19  9:29 AM    Dictated utilizing Dragon dictation.

## 2019-08-16 NOTE — PLAN OF CARE
Problem: Patient Care Overview  Goal: Plan of Care Review  Outcome: Ongoing (interventions implemented as appropriate)    Goal: Individualization and Mutuality  Outcome: Ongoing (interventions implemented as appropriate)    Goal: Discharge Needs Assessment  Outcome: Ongoing (interventions implemented as appropriate)    Goal: Interprofessional Rounds/Family Conf  Outcome: Ongoing (interventions implemented as appropriate)      Problem: Fall Risk (Adult)  Goal: Absence of Fall  Outcome: Ongoing (interventions implemented as appropriate)      Problem: Skin Injury Risk (Adult)  Goal: Skin Health and Integrity  Outcome: Ongoing (interventions implemented as appropriate)      Problem: Pain, Chronic (Adult)  Goal: Acceptable Pain/Comfort Level and Functional Ability  Outcome: Ongoing (interventions implemented as appropriate)      Problem: Arrhythmia/Dysrhythmia (Symptomatic) (Adult)  Goal: Signs and Symptoms of Listed Potential Problems Will be Absent, Minimized or Managed (Arrhythmia/Dysrhythmia)  Outcome: Ongoing (interventions implemented as appropriate)

## 2019-08-17 LAB
ACINETOBACTER SCREEN CX: NORMAL
ALBUMIN SERPL-MCNC: 3.7 G/DL (ref 3.5–5.2)
ANION GAP SERPL CALCULATED.3IONS-SCNC: 12.5 MMOL/L (ref 5–15)
BUN BLD-MCNC: 37 MG/DL (ref 8–23)
BUN/CREAT SERPL: 16.7 (ref 7–25)
CALCIUM SPEC-SCNC: 8.8 MG/DL (ref 8.6–10.5)
CHLORIDE SERPL-SCNC: 104 MMOL/L (ref 98–107)
CO2 SERPL-SCNC: 25.5 MMOL/L (ref 22–29)
COLLECT DURATION TIME UR: 24 HRS
CREAT BLD-MCNC: 2.21 MG/DL (ref 0.57–1)
CREAT CL 24H UR+SERPL-VRATE: 14.6 ML/MIN (ref 88–128)
CREAT CL 24H UR+SERPL-VRATE: 21 L/24 HR (ref 126.7–184.3)
CREAT UR-MCNC: 29.5 MG/DL
CREAT UR-MCNC: 31.4 MG/DL
CREATINE 24H UR-MRATE: 0.62 G/24 HR (ref 0.7–1.6)
CREATINE 24H UR-MRATE: 0.66 G/24 HR (ref 0.7–1.6)
GFR SERPL CREATININE-BSD FRML MDRD: 23 ML/MIN/1.73
GFR SERPL CREATININE-BSD FRML MDRD: 27 ML/MIN/1.73
GLUCOSE BLD-MCNC: 199 MG/DL (ref 65–99)
GLUCOSE BLDC GLUCOMTR-MCNC: 189 MG/DL (ref 70–130)
GLUCOSE BLDC GLUCOMTR-MCNC: 195 MG/DL (ref 70–130)
GLUCOSE BLDC GLUCOMTR-MCNC: 209 MG/DL (ref 70–130)
GLUCOSE BLDC GLUCOMTR-MCNC: 218 MG/DL (ref 70–130)
PHOSPHATE SERPL-MCNC: 3.3 MG/DL (ref 2.5–4.5)
POTASSIUM BLD-SCNC: 4.1 MMOL/L (ref 3.5–5.2)
PROT 24H UR-MRATE: 945 MG/24HOURS (ref 0–150)
PROT UR-MCNC: 45 MG/DL
SODIUM BLD-SCNC: 142 MMOL/L (ref 136–145)
SPECIMEN VOL 24H UR: 2100 ML

## 2019-08-17 PROCEDURE — 82962 GLUCOSE BLOOD TEST: CPT

## 2019-08-17 PROCEDURE — 63710000001 INSULIN ASPART PER 5 UNITS: Performed by: INTERNAL MEDICINE

## 2019-08-17 PROCEDURE — 99232 SBSQ HOSP IP/OBS MODERATE 35: CPT | Performed by: FAMILY MEDICINE

## 2019-08-17 PROCEDURE — 99232 SBSQ HOSP IP/OBS MODERATE 35: CPT | Performed by: INTERNAL MEDICINE

## 2019-08-17 PROCEDURE — 94799 UNLISTED PULMONARY SVC/PX: CPT

## 2019-08-17 PROCEDURE — 80069 RENAL FUNCTION PANEL: CPT | Performed by: INTERNAL MEDICINE

## 2019-08-17 RX ADMIN — OXYCODONE HYDROCHLORIDE AND ACETAMINOPHEN 500 MG: 500 TABLET ORAL at 10:25

## 2019-08-17 RX ADMIN — GUAIFENESIN 600 MG: 600 TABLET, EXTENDED RELEASE ORAL at 10:23

## 2019-08-17 RX ADMIN — APIXABAN 5 MG: 5 TABLET, FILM COATED ORAL at 10:25

## 2019-08-17 RX ADMIN — POLYETHYLENE GLYCOL 3350 17 G: 17 POWDER, FOR SOLUTION ORAL at 10:23

## 2019-08-17 RX ADMIN — AMIODARONE HYDROCHLORIDE 100 MG: 200 TABLET ORAL at 10:24

## 2019-08-17 RX ADMIN — ISOSORBIDE MONONITRATE 15 MG: 30 TABLET, EXTENDED RELEASE ORAL at 10:24

## 2019-08-17 RX ADMIN — INSULIN ASPART 4 UNITS: 100 INJECTION, SOLUTION INTRAVENOUS; SUBCUTANEOUS at 17:17

## 2019-08-17 RX ADMIN — CEFUROXIME AXETIL 500 MG: 250 TABLET ORAL at 10:24

## 2019-08-17 RX ADMIN — SODIUM CHLORIDE, PRESERVATIVE FREE 3 ML: 5 INJECTION INTRAVENOUS at 10:26

## 2019-08-17 RX ADMIN — FERROUS SULFATE TAB EC 324 MG (65 MG FE EQUIVALENT) 324 MG: 324 (65 FE) TABLET DELAYED RESPONSE at 10:23

## 2019-08-17 RX ADMIN — IPRATROPIUM BROMIDE 0.5 MG: 0.5 SOLUTION RESPIRATORY (INHALATION) at 13:17

## 2019-08-17 RX ADMIN — ATORVASTATIN CALCIUM 10 MG: 10 TABLET, FILM COATED ORAL at 20:45

## 2019-08-17 RX ADMIN — PANTOPRAZOLE SODIUM 40 MG: 40 TABLET, DELAYED RELEASE ORAL at 10:23

## 2019-08-17 RX ADMIN — IPRATROPIUM BROMIDE 0.5 MG: 0.5 SOLUTION RESPIRATORY (INHALATION) at 19:17

## 2019-08-17 RX ADMIN — Medication 250 MG: at 20:46

## 2019-08-17 RX ADMIN — APIXABAN 2.5 MG: 2.5 TABLET, FILM COATED ORAL at 20:48

## 2019-08-17 RX ADMIN — SPIRONOLACTONE 25 MG: 25 TABLET ORAL at 10:23

## 2019-08-17 RX ADMIN — FERROUS SULFATE TAB EC 324 MG (65 MG FE EQUIVALENT) 324 MG: 324 (65 FE) TABLET DELAYED RESPONSE at 17:17

## 2019-08-17 RX ADMIN — ASPIRIN 81 MG 81 MG: 81 TABLET ORAL at 10:24

## 2019-08-17 RX ADMIN — Medication 1 APPLICATION: at 20:48

## 2019-08-17 RX ADMIN — IPRATROPIUM BROMIDE 0.5 MG: 0.5 SOLUTION RESPIRATORY (INHALATION) at 06:52

## 2019-08-17 RX ADMIN — INSULIN ASPART 2 UNITS: 100 INJECTION, SOLUTION INTRAVENOUS; SUBCUTANEOUS at 12:27

## 2019-08-17 RX ADMIN — Medication 250 MG: at 10:27

## 2019-08-17 RX ADMIN — CEFUROXIME AXETIL 500 MG: 250 TABLET ORAL at 20:46

## 2019-08-17 RX ADMIN — DOCUSATE SODIUM 100 MG: 100 CAPSULE, LIQUID FILLED ORAL at 10:23

## 2019-08-17 RX ADMIN — INSULIN ASPART 2 UNITS: 100 INJECTION, SOLUTION INTRAVENOUS; SUBCUTANEOUS at 06:30

## 2019-08-17 RX ADMIN — MULTIPLE VITAMINS W/ MINERALS TAB 1 TABLET: TAB at 10:24

## 2019-08-17 RX ADMIN — INSULIN ASPART 4 UNITS: 100 INJECTION, SOLUTION INTRAVENOUS; SUBCUTANEOUS at 22:34

## 2019-08-17 RX ADMIN — GUAIFENESIN 600 MG: 600 TABLET, EXTENDED RELEASE ORAL at 20:46

## 2019-08-17 RX ADMIN — POLYETHYLENE GLYCOL 3350 17 G: 17 POWDER, FOR SOLUTION ORAL at 20:47

## 2019-08-17 RX ADMIN — SODIUM CHLORIDE, PRESERVATIVE FREE 3 ML: 5 INJECTION INTRAVENOUS at 20:49

## 2019-08-17 RX ADMIN — LEVOTHYROXINE SODIUM 150 MCG: 150 TABLET ORAL at 05:39

## 2019-08-17 RX ADMIN — Medication 220 MG: at 20:46

## 2019-08-17 RX ADMIN — Medication 220 MG: at 10:39

## 2019-08-17 RX ADMIN — TORSEMIDE 40 MG: 20 TABLET ORAL at 10:25

## 2019-08-17 RX ADMIN — BISOPROLOL FUMARATE 10 MG: 5 TABLET ORAL at 10:25

## 2019-08-17 RX ADMIN — Medication 1 APPLICATION: at 10:28

## 2019-08-17 RX ADMIN — LIDOCAINE 1 PATCH: 50 PATCH CUTANEOUS at 17:18

## 2019-08-17 RX ADMIN — DOCUSATE SODIUM 100 MG: 100 CAPSULE, LIQUID FILLED ORAL at 20:46

## 2019-08-17 NOTE — PROGRESS NOTES
"   LOS: 3 days    Patient Care Team:  Marianela Albright APRN as PCP - General (Family Medicine)  Geremias Shepherd MD as Consulting Physician (General Surgery)  Lisa Cardoso MD as Surgeon (General Surgery)    Chief Complaint:  No chief complaint on file.    Follow UP FREDDY CKD4  Subjective     Interval History: wt same.  Recorded UOP 650cc.  Denies dyspnea.  Less swelling.    Objective     Vital Signs  Temp:  [97.4 °F (36.3 °C)-98.1 °F (36.7 °C)] 98.1 °F (36.7 °C)  Heart Rate:  [55-62] 62  Resp:  [17-20] 20  BP: (148-159)/(69-87) 151/87    Flowsheet Rows      First Filed Value   Admission Height  170.2 cm (67\") Documented at 08/14/2019 1725   Admission Weight  144 kg (316 lb 9.6 oz)  (Abnormal)  Documented at 08/14/2019 1659          I/O this shift:  In: 360 [P.O.:360]  Out: 900 [Urine:900]  I/O last 3 completed shifts:  In: 1460 [P.O.:1460]  Out: 1250 [Urine:1250]    Intake/Output Summary (Last 24 hours) at 8/17/2019 1719  Last data filed at 8/17/2019 1548  Gross per 24 hour   Intake 1580 ml   Output 1300 ml   Net 280 ml       Physical Exam:  GEN in chair, comfortable, alert  Neck supple no JVD  Lungs CTA bilat no rales  cv RRR no m/g  abd soft NT/ND  vasc 2+ BLE edema with venous stasis changes     Results Review:    Results from last 7 days   Lab Units 08/17/19  0623 08/16/19  0607 08/15/19  0454   SODIUM mmol/L 142 141 141   POTASSIUM mmol/L 4.1 4.5 4.5   CHLORIDE mmol/L 104 106 106   CO2 mmol/L 25.5 24.4 23.3   BUN mg/dL 37* 37* 30*   CREATININE mg/dL 2.21* 2.11* 1.85*   CALCIUM mg/dL 8.8 9.0 9.0   BILIRUBIN mg/dL  --   --  0.3   ALK PHOS U/L  --   --  152*   ALT (SGPT) U/L  --   --  11   AST (SGOT) U/L  --   --  15   GLUCOSE mg/dL 199* 176* 238*       Estimated Creatinine Clearance: 39.9 mL/min (A) (by C-G formula based on SCr of 2.21 mg/dL (H)).    Results from last 7 days   Lab Units 08/17/19  0623 08/16/19  0607   MAGNESIUM mg/dL  --  1.8   PHOSPHORUS mg/dL 3.3 3.4             Results from last 7 days   Lab " Units 08/16/19  0606 08/15/19  0454 08/13/19  1745   WBC 10*3/mm3 6.28 4.60 3.9*   HEMOGLOBIN g/dL 10.1* 9.7* 10.8*   PLATELETS 10*3/mm3 145 134* 131*               Imaging Results (last 24 hours)     ** No results found for the last 24 hours. **          amiodarone 100 mg Oral Q24H   ammonium lactate 1 application Topical BID   apixaban 5 mg Oral Q12H   aspirin 81 mg Oral Daily   atorvastatin 10 mg Oral Nightly   bisoprolol 10 mg Oral Q24H   cefuroxime 500 mg Oral Q12H   docusate sodium 100 mg Oral BID   ferrous sulfate 324 mg Oral BID With Meals   guaiFENesin 600 mg Oral BID   insulin aspart 0-9 Units Subcutaneous 4x Daily AC & at Bedtime   ipratropium 0.5 mg Nebulization 4x Daily - RT   isosorbide mononitrate 15 mg Oral Daily   levothyroxine 150 mcg Oral Q AM   lidocaine 1 patch Transdermal Q24H   multivitamin with minerals 1 tablet Oral Daily   pantoprazole 40 mg Oral Daily   polyethylene glycol 17 g Oral BID   saccharomyces boulardii 250 mg Oral BID   sodium chloride 3 mL Intravenous Q12H   spironolactone 25 mg Oral Daily   torsemide 40 mg Oral Daily   ascorbic acid 500 mg Oral Daily   zinc sulfate 220 mg Oral BID          Medication Review:   Current Facility-Administered Medications   Medication Dose Route Frequency Provider Last Rate Last Dose   • acetaminophen (TYLENOL) tablet 650 mg  650 mg Oral Q4H PRN Elmer Loepz, DO       • amiodarone (PACERONE) tablet 100 mg  100 mg Oral Q24H Kevin Aceves MD   100 mg at 08/17/19 1024   • ammonium lactate (AMLACTIN) 12 % cream 1 application  1 application Topical BID Elmer Lopez, DO   1 application at 08/17/19 1028   • apixaban (ELIQUIS) tablet 5 mg  5 mg Oral Q12H Elmer Lopez, DO   5 mg at 08/17/19 1025   • aspirin chewable tablet 81 mg  81 mg Oral Daily Elmer Lopez, DO   81 mg at 08/17/19 1024   • atorvastatin (LIPITOR) tablet 10 mg  10 mg Oral Nightly Elmer Lopez, DO   10 mg at 08/16/19 2218   • bisacodyl (DULCOLAX)  suppository 10 mg  10 mg Rectal Daily PRN Elmer Lopez, DO       • bisoprolol (ZEBeta) tablet 10 mg  10 mg Oral Q24H Kevin Aceves MD   10 mg at 08/17/19 1025   • cefuroxime (CEFTIN) tablet 500 mg  500 mg Oral Q12H Samson Reyes MD   500 mg at 08/17/19 1024   • dextrose (D50W) 25 g/ 50mL Intravenous Solution 25 g  25 g Intravenous Q15 Min PRN Elmer Lopez, DO       • dextrose (GLUTOSE) oral gel 1 tube  1 tube Oral Q15 Min PRN Elmer Lopez, DO       • docusate sodium (COLACE) capsule 100 mg  100 mg Oral BID Elmer Lopez DO   100 mg at 08/17/19 1023   • ferrous sulfate EC tablet 324 mg  324 mg Oral BID With Meals Elmer Loepz DO   324 mg at 08/17/19 1717   • glucagon (human recombinant) (GLUCAGEN DIAGNOSTIC) injection 1 mg  1 mg Subcutaneous PRN Elmer Lopez, DO       • guaiFENesin (MUCINEX) 12 hr tablet 600 mg  600 mg Oral BID Elmer Lopez DO   600 mg at 08/17/19 1023   • insulin aspart (novoLOG) injection 0-9 Units  0-9 Units Subcutaneous 4x Daily AC & at Bedtime Elmer Lopez DO   4 Units at 08/17/19 1717   • ipratropium (ATROVENT) nebulizer solution 0.5 mg  0.5 mg Nebulization 4x Daily - RT Elmer Lopez DO   0.5 mg at 08/17/19 1317   • isosorbide mononitrate (IMDUR) 24 hr tablet 15 mg  15 mg Oral Daily Elmer Lopez DO   15 mg at 08/17/19 1024   • levothyroxine (SYNTHROID, LEVOTHROID) tablet 150 mcg  150 mcg Oral Q AM Elmer Lopez DO   150 mcg at 08/17/19 0539   • lidocaine (LIDODERM) 5 % 1 patch  1 patch Transdermal Q24H Elmer Lopez DO   1 patch at 08/17/19 1718   • metoprolol tartrate (LOPRESSOR) injection 5 mg  5 mg Intravenous Q4H PRN Elmer Lopez DO   5 mg at 08/15/19 0901   • multivitamin with minerals 1 tablet  1 tablet Oral Daily Elmer Lopez DO   1 tablet at 08/17/19 1024   • nitroglycerin (NITROSTAT) SL tablet 0.4 mg  0.4 mg Sublingual Q5 Min PRN Elmer Lopez DO       • ondansetron  (ZOFRAN) injection 4 mg  4 mg Intravenous Q6H PRN Elmer Lopez, DO       • pantoprazole (PROTONIX) EC tablet 40 mg  40 mg Oral Daily Elmer Lopez, DO   40 mg at 08/17/19 1023   • polyethylene glycol (MIRALAX) powder 17 g  17 g Oral BID Elmer Lopez, DO   17 g at 08/17/19 1023   • saccharomyces boulardii (FLORASTOR) capsule 250 mg  250 mg Oral BID Elmer Lopez, DO   250 mg at 08/17/19 1027   • sodium chloride 0.9 % flush 3 mL  3 mL Intravenous Q12H Elmer Lopez, DO   3 mL at 08/17/19 1026   • sodium chloride 0.9 % flush 3-10 mL  3-10 mL Intravenous PRN Elmer Lopez, DO       • spironolactone (ALDACTONE) tablet 25 mg  25 mg Oral Daily Milad Leone MD, FASN   25 mg at 08/17/19 1023   • torsemide (DEMADEX) tablet 40 mg  40 mg Oral Daily Kevin Aceves MD   40 mg at 08/17/19 1025   • vitamin C (ASCORBIC ACID) tablet 500 mg  500 mg Oral Daily Elmer Lopez, DO   500 mg at 08/17/19 1025   • zinc sulfate (ZINCATE) capsule 220 mg  220 mg Oral BID Elmer Lopez, DO   220 mg at 08/17/19 1039       Assessment/Plan   1.   Acute kidney injury  2.   CKD (chronic kidney disease) stage 4, GFR 15-29 ml/min (CMS/HCC)  3.   Anemia due to stage 4 chronic kidney disease (CMS/HCC)  4.   Refractory atrial flutter with RVR  5.   Acute bronchitis present on admission  6.   Chronic diastolic congestive heart failure (CMS/HCC)  7.   Hypothyroidism  8.   Diabetes mellitus (CMS/HCC)  9.   Paroxysmal atrial fibrillation (CMS/HCC)  10. Cor pulmonale, chronic (CMS/HCC)  11. Lymphedema of both lower extremities  12. Morbid obesity with BMI of 45.0-49.9, adult (CMS/[facility])    Plan  - GFR worse on 24h urine (15 ml/min) than SCr suggests, based on low muscle mass.  As such, will reduce eliquis 2.5 BID and stop aldactone (though K stable in 4s now, concerned about potential of hyperkalemia given very poor renal fcn).  No uremic sx.  No indication for HD.  Cont torsemide 40mg daily.  For now.  Cr  up slightly past couple days            Paroxysmal atrial fibrillation with rapid ventricular response (CMS/[facility])    Acquired hypothyroidism    Type 2 diabetes mellitus with nephropathy (CMS/HCC)    Anemia due to stage 4 chronic kidney disease (CMS/[facility])    Chronic diastolic congestive heart failure (CMS/HCC)    Pulmonary hypertension (CMS/[facility])    Cor pulmonale, chronic (CMS/HCC)    Lymphedema of both lower extremities    Morbid obesity with BMI of 45.0-49.9, adult (CMS/[facility])    A-fib (CMS/[facility])    CKD (chronic kidney disease) stage 3, GFR 30-59 ml/min (CMS/[facility])    Acute bronchitis due to other specified organisms              Quinn Bennett MD  08/17/19  5:19 PM

## 2019-08-17 NOTE — PLAN OF CARE
Problem: Patient Care Overview  Goal: Plan of Care Review  Outcome: Ongoing (interventions implemented as appropriate)   08/17/19 3278   Coping/Psychosocial   Plan of Care Reviewed With patient   Plan of Care Review   Progress improving   OTHER   Outcome Summary Pt up in chair most of shift, 24hr urine obtained and FC dc'd per parameters. Pt has no c/o pain/discomfort. Legs elevated to assist in decreasing BLE edema. Will cont to monitor.        Problem: Fall Risk (Adult)  Goal: Absence of Fall  Outcome: Ongoing (interventions implemented as appropriate)      Problem: Skin Injury Risk (Adult)  Goal: Skin Health and Integrity  Outcome: Ongoing (interventions implemented as appropriate)      Problem: Pain, Chronic (Adult)  Goal: Acceptable Pain/Comfort Level and Functional Ability  Outcome: Ongoing (interventions implemented as appropriate)      Problem: Arrhythmia/Dysrhythmia (Symptomatic) (Adult)  Goal: Signs and Symptoms of Listed Potential Problems Will be Absent, Minimized or Managed (Arrhythmia/Dysrhythmia)  Outcome: Ongoing (interventions implemented as appropriate)

## 2019-08-17 NOTE — PLAN OF CARE
Problem: Patient Care Overview  Goal: Plan of Care Review  Outcome: Ongoing (interventions implemented as appropriate)   08/17/19 5389   Coping/Psychosocial   Plan of Care Reviewed With patient   Plan of Care Review   Progress improving       Problem: Fall Risk (Adult)  Goal: Absence of Fall  Outcome: Ongoing (interventions implemented as appropriate)      Problem: Skin Injury Risk (Adult)  Goal: Skin Health and Integrity  Outcome: Ongoing (interventions implemented as appropriate)      Problem: Pain, Chronic (Adult)  Goal: Acceptable Pain/Comfort Level and Functional Ability  Outcome: Ongoing (interventions implemented as appropriate)      Problem: Arrhythmia/Dysrhythmia (Symptomatic) (Adult)  Goal: Signs and Symptoms of Listed Potential Problems Will be Absent, Minimized or Managed (Arrhythmia/Dysrhythmia)  Outcome: Ongoing (interventions implemented as appropriate)

## 2019-08-17 NOTE — PROGRESS NOTES
[facility]    Name:  Millicent Mckeon   Age:  59 y.o.  Sex:  female  :  1960  MRN:  [id number]   Visit Number:  [id number]  Admission Date:  2019  Date Of Service:  19  Primary Care Physician:  Marianela Albright APRN     LOS: 3 days :  Patient Care Team:  Marianela Albright APRN as PCP - General (Family Medicine)  Geremias Shepherd MD as Consulting Physician (General Surgery)  Lisa Cardoso MD as Surgeon (General Surgery):    Chief Complaint:      Shortness of breath, weakness, swelling    Subjective / Interval History:     Patient denies acute concerns this morning.  She denies any feelings of chest pains or palpitations.  We discussed she may end up needing dialysis for which she understands.  She is still in agreement that she wanted to go back to the nursing home upon discharge.  Denies any concerns otherwise.    Prior work-up:  This is a 59-year-old female with history of atrial fibrillation, hypertension, morbid obesity, diabetes, chronic kidney disease, poor functional status and hypothyroidism was transferred from [facility] with atrial fibrillation with rapid ventricular rate resistant to multiple medical therapy.  She was seen by Dr. Aceves and subsequently underwent DC cardioversion on 2019.  Following the cardioversion she was noted to have sinus bradycardia and her amiodarone therapy was decreased to 100 mg daily.  Dr. Aceves did make adjustment to her medications with decreasing the dose of Bystolic to 10 mg daily.  Transesophageal echocardiogram showed reduction in her left ventricular ejection fraction from 55% to 35% which is attributed likely to the tachycardia mediated transient cardiomyopathy.  Patient also has history of chronic cough and shortness of breath which was thought to be related to her diastolic heart failure.  She was started on low-dose spironolactone as well as torsemide by   Breeding.        Review of Systems:     General ROS: Patient denies any fevers, chills or loss of consciousness.  Overall weakness.  Respiratory ROS: Denies cough or shortness of breath.  Cardiovascular ROS: Denies chest pain or palpitations. No history of exertional chest pain.  Gastrointestinal ROS: Denies nausea and vomiting. Denies any abdominal pain. No diarrhea.  Neurological ROS: Denies any focal weakness. No loss of consciousness. Denies any numbness.  Dermatological ROS: Denies any redness or pruritis.    Vital Signs:    Temp:  [97.4 °F (36.3 °C)-97.9 °F (36.6 °C)] 97.8 °F (36.6 °C)  Heart Rate:  [54-62] 62  Resp:  [17-20] 20  BP: (129-159)/(69-78) 152/75    Intake and output:    I/O last 3 completed shifts:  In: 1460 [P.O.:1460]  Out: 1250 [Urine:1250]  I/O this shift:  In: 360 [P.O.:360]  Out: -     Physical Examination:    General Appearance:  Alert and cooperative, not in any acute distress.   Head:  Atraumatic and normocephalic, without obvious abnormality.   Eyes:          PERRLA, conjunctivae and sclerae normal, no Icterus. No pallor. Extraocular movements are within normal limits.   Neck: Supple, trachea midline, no thyromegaly.   Lungs:   Breath sounds heard bilaterally equally.  No crackles or wheezing. No Pleural rub or bronchial breathing.   Heart:  Normal S1 and S2, no murmur, no gallop, no rub. No JVD   Abdomen:   Normal bowel sounds, no masses, no organomegaly. Soft, non-tender, non-distended, no guarding, no rebound tenderness.   Extremities: Moves all extremities well, 3-4+ edema, no cyanosis, no clubbing.  Very thick, keratotic, lower extremities, but no infection   Skin: No bleeding, bruising or rash.   Neurologic: Awake, alert and oriented times 3. Moves all 4 extremities equally.     Laboratory results:    Results from last 7 days   Lab Units 08/17/19  0623 08/16/19  0607 08/15/19  0454   SODIUM mmol/L 142 141 141   POTASSIUM mmol/L 4.1 4.5 4.5   CHLORIDE mmol/L 104 106 106   CO2 mmol/L  25.5 24.4 23.3   BUN mg/dL 37* 37* 30*   CREATININE mg/dL 2.21* 2.11* 1.85*   CALCIUM mg/dL 8.8 9.0 9.0   BILIRUBIN mg/dL  --   --  0.3   ALK PHOS U/L  --   --  152*   ALT (SGPT) U/L  --   --  11   AST (SGOT) U/L  --   --  15   GLUCOSE mg/dL 199* 176* 238*     Results from last 7 days   Lab Units 08/16/19  0606 08/15/19  0454 08/13/19  1745   WBC 10*3/mm3 6.28 4.60 3.9*   HEMOGLOBIN g/dL 10.1* 9.7* 10.8*   HEMATOCRIT % 34.0 32.2* 36.1*   PLATELETS 10*3/mm3 145 134* 131*         Results from last 7 days   Lab Units 08/15/19  0454 08/14/19  2317 08/14/19  1743 08/13/19  1745   TROPONIN I ng/mL  --   --   --  <0.30   TROPONIN T ng/mL <0.010 <0.010 <0.010  --      Results from last 7 days   Lab Units 08/14/19  1636   MRSA SCREEN CX  No Methicillin Resistant Staphylococcus aureus isolated       I have reviewed the patient's laboratory results.    Radiology results:    Imaging Results (last 24 hours)     ** No results found for the last 24 hours. **          I have reviewed the patient's radiology reports.    Medication Review:     I have reviewed the patients active and prn medications.       Paroxysmal atrial fibrillation with rapid ventricular response (CMS/[facility])    Acquired hypothyroidism    Type 2 diabetes mellitus with nephropathy (CMS/[facility])    Anemia due to stage 4 chronic kidney disease (CMS/[facility])    Chronic diastolic congestive heart failure (CMS/[facility])    Pulmonary hypertension (CMS/[facility])    Cor pulmonale, chronic (CMS/[facility])    Lymphedema of both lower extremities    Morbid obesity with BMI of 45.0-49.9, adult (CMS/[facility])    A-fib (CMS/[facility])    CKD (chronic kidney disease) stage 3, GFR 30-59 ml/min (CMS/[facility])    Acute bronchitis due to other specified organisms      Assessment/Plan:    1.  Atrial fibrillation with rapid ventricular rate, status post cardioversion on 8/16/2019.  2.  Acute on chronic systolic heart failure with ejection fraction of 35%.  3.  Acute renal failure with underlying chronic kidney disease stage  III.  4.  Chronic cor pulmonale.  5.  Chronic hypoxic respiratory failure on home oxygen.  6.  Acute bronchitis on antibiotics.  7.  Morbid obesity with a BMI of 50.  8.  Diabetes mellitus type 2 with nephropathy.  9.  Chronic venous insufficiency of the lower extremities.  10.  Anemia of chronic kidney disease.  11.  Functional quadriplegia.    59-year-old admitted with above diagnosis who is improving.  She is now remaining in sinus rhythm after cardioversion per cards.  She is still overtly overloaded, and seems cardiology wants to continue torsemide.  We are monitoring her renal function very closely.  Both nephrology and cardiology recommendations are appreciated.    We will continue patient on Eliquis, which may need renally dosed depending on creatinine.  Also be continued on amiodarone per cardiology.  Will monitor her urine output and renal function closely with labs in the morning.  Patient will also be continued on Ceftin for her bronchitis.    Patient's plan of care was discussed with her.  Anticipate 1-2 more day stay.      Cheyanne Valencia DO  08/17/19  2:02 PM    Dictated utilizing Dragon dictation.

## 2019-08-17 NOTE — PROGRESS NOTES
[facility] Cardiology IP Progress Note    Millicent Mckeon  1960  [id number]  08/17/19      Subjective:   Mrs. Millicent Mckeon is a 59 y.o. female who was initially admitted on 8/14 for further management of atrial fibrillation with rapid ventricular rates.  She subsequently underwent SACHIN with subsequent cardioversion on 8/15.  It was noted her LVEF had dropped to approximately 35%.  Overnight, on review telemetry the patient has remained in sinus rhythm with no recurrent episodes of atrial flutter.  This morning, the patient does report mild improvement in her cough. She denies any episodes of chest pain, palpitations, or significant dyspnea.  She has chronic lower extremity edema, which she reports appears to be near baseline.  Continues to tolerate her cardiovascular medications well, and denies dizziness or lightheadedness.  She has no new complaints at this time.    Review of Systems:   Review of Systems   Constitutional: Positive for fatigue. Negative for chills, diaphoresis and fever.   Respiratory: Negative for cough, chest tightness, shortness of breath and wheezing.    Cardiovascular: Positive for leg swelling. Negative for chest pain and palpitations.   Gastrointestinal: Negative for abdominal pain and GERD.   Neurological: Negative for dizziness, syncope and light-headedness.   Psychiatric/Behavioral: Negative for depressed mood. The patient is not nervous/anxious.        I have reviewed and/or updated the patient's past medical, past surgical, family history, social history, problem list and allergies as appropriate in the chart.     Physical Exam:  Vital Signs:   Vitals:    08/17/19 0359 08/17/19 0500 08/17/19 0652 08/17/19 0754   BP: 156/74   150/69   BP Location: Right arm   Right arm   Patient Position: Lying   Lying   Pulse:   55 56   Resp: 18 17 18   Temp: 97.9 °F (36.6 °C)   97.4 °F (36.3 °C)   TempSrc: Oral   Oral   SpO2: 93%  94% 97%   Weight:  (!) 144 kg (316 lb 8 oz)      Height:           Physical Exam   Constitutional: She is oriented to person, place, and time. She appears well-developed. No distress.   Obese female.   HENT:   Head: Normocephalic and atraumatic.   Moist Mucous Membranes.    Eyes: No scleral icterus.   Cardiovascular: Normal rate, regular rhythm, normal heart sounds and intact distal pulses. Exam reveals no gallop and no friction rub.   No murmur heard.  Unable to appreciate JVD.  3+ bilateral lower extremity pitting edema.     Pulmonary/Chest: Effort normal and breath sounds normal. No stridor. No respiratory distress. She has no wheezes. She has no rales. She exhibits no tenderness.   Abdominal: Soft. Bowel sounds are normal. She exhibits no distension. There is no tenderness. There is no rebound and no guarding.   Obese abdomen.   Neurological: She is alert and oriented to person, place, and time.   Skin: She is not diaphoretic.   Psychiatric: She has a normal mood and affect. Her behavior is normal.   Nursing note and vitals reviewed.      Results Review:   Results from last 7 days   Lab Units 08/17/19  0623  08/15/19  0454   SODIUM mmol/L 142   < > 141   POTASSIUM mmol/L 4.1   < > 4.5   CHLORIDE mmol/L 104   < > 106   CO2 mmol/L 25.5   < > 23.3   BUN mg/dL 37*   < > 30*   CREATININE mg/dL 2.21*   < > 1.85*   CALCIUM mg/dL 8.8   < > 9.0   BILIRUBIN mg/dL  --   --  0.3   ALK PHOS U/L  --   --  152*   ALT (SGPT) U/L  --   --  11   AST (SGOT) U/L  --   --  15   GLUCOSE mg/dL 199*   < > 238*    < > = values in this interval not displayed.     Results from last 7 days   Lab Units 08/15/19  0454 08/14/19  2317 08/14/19  1743 08/13/19  1745   TROPONIN I ng/mL  --   --   --  <0.30   TROPONIN T ng/mL <0.010 <0.010 <0.010  --      @LABRCNTbnp@  Results from last 7 days   Lab Units 08/16/19  0606 08/15/19  0454 08/13/19  1745   WBC 10*3/mm3 6.28 4.60 3.9*   HEMOGLOBIN g/dL 10.1* 9.7* 10.8*   HEMATOCRIT % 34.0 32.2* 36.1*   PLATELETS 10*3/mm3 145 134* 131*          Results from last 7 days   Lab Units 08/16/19  0607   MAGNESIUM mg/dL 1.8     @LABRCNT(chol,trig,hdl,ldl)    I personally viewed and interpreted the patient's EKG/Telemetry data     Medications:   )  Current Facility-Administered Medications:   •  acetaminophen (TYLENOL) tablet 650 mg, 650 mg, Oral, Q4H PRN, Elmer Lopez, DO  •  amiodarone (PACERONE) tablet 100 mg, 100 mg, Oral, Q24H, BreedingKevin MD, 100 mg at 08/17/19 1024  •  ammonium lactate (AMLACTIN) 12 % cream 1 application, 1 application, Topical, BID, Elmer Lopez DO, 1 application at 08/17/19 1028  •  apixaban (ELIQUIS) tablet 5 mg, 5 mg, Oral, Q12H, Elmer Lopez, , 5 mg at 08/17/19 1025  •  aspirin chewable tablet 81 mg, 81 mg, Oral, Daily, Elmer Lopez DO, 81 mg at 08/17/19 1024  •  atorvastatin (LIPITOR) tablet 10 mg, 10 mg, Oral, Nightly, Elmer Lopez DO, 10 mg at 08/16/19 2218  •  bisacodyl (DULCOLAX) suppository 10 mg, 10 mg, Rectal, Daily PRN, Elmer Lopez, DO  •  bisoprolol (ZEBeta) tablet 10 mg, 10 mg, Oral, Q24H, Kevin Aceves MD, 10 mg at 08/17/19 1025  •  cefuroxime (CEFTIN) tablet 500 mg, 500 mg, Oral, Q12H, Samson Reyes MD, 500 mg at 08/17/19 1024  •  dextrose (D50W) 25 g/ 50mL Intravenous Solution 25 g, 25 g, Intravenous, Q15 Min PRN, Elmer Lopez, DO  •  dextrose (GLUTOSE) oral gel 1 tube, 1 tube, Oral, Q15 Min PRN, Elmer Lopez, DO  •  docusate sodium (COLACE) capsule 100 mg, 100 mg, Oral, BID, Elmer Lopez DO, 100 mg at 08/17/19 1023  •  ferrous sulfate EC tablet 324 mg, 324 mg, Oral, BID With Meals, Elmer Lopez, DO, 324 mg at 08/17/19 1023  •  glucagon (human recombinant) (GLUCAGEN DIAGNOSTIC) injection 1 mg, 1 mg, Subcutaneous, PRN, Elmer Lopez, DO  •  guaiFENesin (MUCINEX) 12 hr tablet 600 mg, 600 mg, Oral, BID, Elmer Lopez, , 600 mg at 08/17/19 1023  •  insulin aspart (novoLOG) injection 0-9 Units, 0-9 Units, Subcutaneous, 4x  Daily AC & at Bedtime, Elmer Lopez DO, 2 Units at 08/17/19 0630  •  ipratropium (ATROVENT) nebulizer solution 0.5 mg, 0.5 mg, Nebulization, 4x Daily - RT, Elmer Lopez DO, 0.5 mg at 08/17/19 0652  •  isosorbide mononitrate (IMDUR) 24 hr tablet 15 mg, 15 mg, Oral, Daily, Elmer Lopez DO, 15 mg at 08/17/19 1024  •  levothyroxine (SYNTHROID, LEVOTHROID) tablet 150 mcg, 150 mcg, Oral, Q AM, Elmer Lopez DO, 150 mcg at 08/17/19 0539  •  lidocaine (LIDODERM) 5 % 1 patch, 1 patch, Transdermal, Q24H, Elmer Lopez DO, 1 patch at 08/16/19 1917  •  metoprolol tartrate (LOPRESSOR) injection 5 mg, 5 mg, Intravenous, Q4H PRN, Elmer Lopez DO, 5 mg at 08/15/19 0901  •  multivitamin with minerals 1 tablet, 1 tablet, Oral, Daily, Elmer Lopez DO, 1 tablet at 08/17/19 1024  •  nitroglycerin (NITROSTAT) SL tablet 0.4 mg, 0.4 mg, Sublingual, Q5 Min PRN, Elmer Lopez DO  •  ondansetron (ZOFRAN) injection 4 mg, 4 mg, Intravenous, Q6H PRN, Elmer Lopez DO  •  pantoprazole (PROTONIX) EC tablet 40 mg, 40 mg, Oral, Daily, Elmer Lopez DO, 40 mg at 08/17/19 1023  •  polyethylene glycol (MIRALAX) powder 17 g, 17 g, Oral, BID, Elmer Lopez DO, 17 g at 08/17/19 1023  •  saccharomyces boulardii (FLORASTOR) capsule 250 mg, 250 mg, Oral, BID, Elmer Lopez DO, 250 mg at 08/17/19 1027  •  sodium chloride 0.9 % flush 3 mL, 3 mL, Intravenous, Q12H, Elmer Lopez DO, 3 mL at 08/17/19 1026  •  sodium chloride 0.9 % flush 3-10 mL, 3-10 mL, Intravenous, PRN, Elmer Lopez, DO  •  spironolactone (ALDACTONE) tablet 25 mg, 25 mg, Oral, Daily, Milad Leone MD, FASN, 25 mg at 08/17/19 1023  •  torsemide (DEMADEX) tablet 40 mg, 40 mg, Oral, Daily, Breeding, Kevin SAXENA MD, 40 mg at 08/17/19 1025  •  vitamin C (ASCORBIC ACID) tablet 500 mg, 500 mg, Oral, Daily, Elmer Lopez, DO, 500 mg at 08/17/19 1025  •  zinc sulfate (ZINCATE) capsule 220 mg, 220  mg, Oral, BID, Hiadelina Elmer LUCRECIA, DO, 220 mg at 08/17/19 1039    Assessment / Plan:     1.  Atrial fibrillation with rapid ventricular rate, status post cardioversion on 8/16/2019.  2.  Acute on chronic systolic heart failure with ejection fraction of 35%.  3.  Acute renal failure with underlying chronic kidney disease stage III.  4.  Chronic cor pulmonale.  5.  Chronic hypoxic respiratory failure on home oxygen.  6.  Acute bronchitis on antibiotics.  7.  Morbid obesity with a BMI of 50.  8.  Diabetes mellitus type 2 with nephropathy.  9.  Chronic venous insufficiency of the lower extremities.  10.  Anemia of chronic kidney disease.  11.  Functional quadriplegia.    Now status post SAHCIN and successful synchronized cardioversion with restoration of sinus rhythm 8/15.  On review of telemetry, she currently remains in sinus rhythm with no recurrent episodes of atrial fibrillation overnight.  Remains mildly bradycardic, which appears to be asymptomatic.  Continue amiodarone for rhythm control.  Currently anticoagulated with Eliquis for CVA prophylaxis, however if renal function continues to deteriorate, will then need to readjust Eliquis dose.  On exam the patient remains grossly volume overloaded, and will therefore continue torsemide.  Given renal function is worsening, diuresis may be limited.  Nephrology currently following, as HD may ultimately be required.      Thank you for allowing me to participate in the care of your patient. Please to not hesitate to contact me with additional questions or concerns.     Please note, this document was produced using voice recognition software.       GILMA Neal MD  Interventional Cardiology   08/17/19  11:59 AM

## 2019-08-18 LAB
ALBUMIN SERPL-MCNC: 3.7 G/DL (ref 3.5–5.2)
ANION GAP SERPL CALCULATED.3IONS-SCNC: 11.9 MMOL/L (ref 5–15)
BUN BLD-MCNC: 38 MG/DL (ref 8–23)
BUN/CREAT SERPL: 16.2 (ref 7–25)
CALCIUM SPEC-SCNC: 8.8 MG/DL (ref 8.6–10.5)
CHLORIDE SERPL-SCNC: 102 MMOL/L (ref 98–107)
CO2 SERPL-SCNC: 27.1 MMOL/L (ref 22–29)
CREAT BLD-MCNC: 2.34 MG/DL (ref 0.57–1)
GFR SERPL CREATININE-BSD FRML MDRD: 21 ML/MIN/1.73
GFR SERPL CREATININE-BSD FRML MDRD: 26 ML/MIN/1.73
GLUCOSE BLD-MCNC: 194 MG/DL (ref 65–99)
GLUCOSE BLDC GLUCOMTR-MCNC: 183 MG/DL (ref 70–130)
GLUCOSE BLDC GLUCOMTR-MCNC: 190 MG/DL (ref 70–130)
GLUCOSE BLDC GLUCOMTR-MCNC: 222 MG/DL (ref 70–130)
GLUCOSE BLDC GLUCOMTR-MCNC: 280 MG/DL (ref 70–130)
PHOSPHATE SERPL-MCNC: 3.5 MG/DL (ref 2.5–4.5)
POTASSIUM BLD-SCNC: 4.4 MMOL/L (ref 3.5–5.2)
SODIUM BLD-SCNC: 141 MMOL/L (ref 136–145)

## 2019-08-18 PROCEDURE — 99233 SBSQ HOSP IP/OBS HIGH 50: CPT | Performed by: INTERNAL MEDICINE

## 2019-08-18 PROCEDURE — 99232 SBSQ HOSP IP/OBS MODERATE 35: CPT | Performed by: INTERNAL MEDICINE

## 2019-08-18 PROCEDURE — 82962 GLUCOSE BLOOD TEST: CPT

## 2019-08-18 PROCEDURE — 94799 UNLISTED PULMONARY SVC/PX: CPT

## 2019-08-18 PROCEDURE — 80069 RENAL FUNCTION PANEL: CPT | Performed by: FAMILY MEDICINE

## 2019-08-18 PROCEDURE — 63710000001 INSULIN ASPART PER 5 UNITS: Performed by: INTERNAL MEDICINE

## 2019-08-18 RX ADMIN — INSULIN ASPART 6 UNITS: 100 INJECTION, SOLUTION INTRAVENOUS; SUBCUTANEOUS at 21:11

## 2019-08-18 RX ADMIN — DOCUSATE SODIUM 100 MG: 100 CAPSULE, LIQUID FILLED ORAL at 09:15

## 2019-08-18 RX ADMIN — INSULIN ASPART 2 UNITS: 100 INJECTION, SOLUTION INTRAVENOUS; SUBCUTANEOUS at 06:51

## 2019-08-18 RX ADMIN — GUAIFENESIN 600 MG: 600 TABLET, EXTENDED RELEASE ORAL at 21:10

## 2019-08-18 RX ADMIN — APIXABAN 2.5 MG: 2.5 TABLET, FILM COATED ORAL at 09:14

## 2019-08-18 RX ADMIN — POLYETHYLENE GLYCOL 3350 17 G: 17 POWDER, FOR SOLUTION ORAL at 21:13

## 2019-08-18 RX ADMIN — ISOSORBIDE MONONITRATE 15 MG: 30 TABLET, EXTENDED RELEASE ORAL at 09:15

## 2019-08-18 RX ADMIN — IPRATROPIUM BROMIDE 0.5 MG: 0.5 SOLUTION RESPIRATORY (INHALATION) at 06:49

## 2019-08-18 RX ADMIN — BISOPROLOL FUMARATE 10 MG: 5 TABLET ORAL at 09:15

## 2019-08-18 RX ADMIN — ASPIRIN 81 MG 81 MG: 81 TABLET ORAL at 09:15

## 2019-08-18 RX ADMIN — Medication 250 MG: at 21:09

## 2019-08-18 RX ADMIN — AMIODARONE HYDROCHLORIDE 100 MG: 200 TABLET ORAL at 09:15

## 2019-08-18 RX ADMIN — OXYCODONE HYDROCHLORIDE AND ACETAMINOPHEN 500 MG: 500 TABLET ORAL at 09:15

## 2019-08-18 RX ADMIN — LIDOCAINE 1 PATCH: 50 PATCH CUTANEOUS at 18:08

## 2019-08-18 RX ADMIN — IPRATROPIUM BROMIDE 0.5 MG: 0.5 SOLUTION RESPIRATORY (INHALATION) at 13:02

## 2019-08-18 RX ADMIN — DOCUSATE SODIUM 100 MG: 100 CAPSULE, LIQUID FILLED ORAL at 21:09

## 2019-08-18 RX ADMIN — APIXABAN 2.5 MG: 2.5 TABLET, FILM COATED ORAL at 21:10

## 2019-08-18 RX ADMIN — IPRATROPIUM BROMIDE 0.5 MG: 0.5 SOLUTION RESPIRATORY (INHALATION) at 19:43

## 2019-08-18 RX ADMIN — Medication 220 MG: at 21:09

## 2019-08-18 RX ADMIN — FERROUS SULFATE TAB EC 324 MG (65 MG FE EQUIVALENT) 324 MG: 324 (65 FE) TABLET DELAYED RESPONSE at 09:15

## 2019-08-18 RX ADMIN — CEFUROXIME AXETIL 500 MG: 250 TABLET ORAL at 21:09

## 2019-08-18 RX ADMIN — FERROUS SULFATE TAB EC 324 MG (65 MG FE EQUIVALENT) 324 MG: 324 (65 FE) TABLET DELAYED RESPONSE at 18:08

## 2019-08-18 RX ADMIN — LEVOTHYROXINE SODIUM 150 MCG: 150 TABLET ORAL at 06:16

## 2019-08-18 RX ADMIN — POLYETHYLENE GLYCOL 3350 17 G: 17 POWDER, FOR SOLUTION ORAL at 09:15

## 2019-08-18 RX ADMIN — SODIUM CHLORIDE, PRESERVATIVE FREE 3 ML: 5 INJECTION INTRAVENOUS at 21:15

## 2019-08-18 RX ADMIN — GUAIFENESIN 600 MG: 600 TABLET, EXTENDED RELEASE ORAL at 09:15

## 2019-08-18 RX ADMIN — MULTIPLE VITAMINS W/ MINERALS TAB 1 TABLET: TAB at 09:14

## 2019-08-18 RX ADMIN — PANTOPRAZOLE SODIUM 40 MG: 40 TABLET, DELAYED RELEASE ORAL at 09:15

## 2019-08-18 RX ADMIN — INSULIN ASPART 4 UNITS: 100 INJECTION, SOLUTION INTRAVENOUS; SUBCUTANEOUS at 12:46

## 2019-08-18 RX ADMIN — TORSEMIDE 40 MG: 20 TABLET ORAL at 09:15

## 2019-08-18 RX ADMIN — CEFUROXIME AXETIL 500 MG: 250 TABLET ORAL at 09:15

## 2019-08-18 RX ADMIN — Medication 220 MG: at 09:14

## 2019-08-18 RX ADMIN — Medication 250 MG: at 09:14

## 2019-08-18 RX ADMIN — Medication 1 APPLICATION: at 21:15

## 2019-08-18 RX ADMIN — INSULIN ASPART 2 UNITS: 100 INJECTION, SOLUTION INTRAVENOUS; SUBCUTANEOUS at 18:08

## 2019-08-18 RX ADMIN — ATORVASTATIN CALCIUM 10 MG: 10 TABLET, FILM COATED ORAL at 21:10

## 2019-08-18 NOTE — PROGRESS NOTES
[facility] Cardiology IP Progress Note    Millicent Mckeon  1960  [id number]  08/18/19      Subjective:   Mrs. Millicent Mckeon is a 59 y.o. female who was initially admitted on 8/14 for further management of atrial fibrillation with rapid ventricular rates.  She subsequently underwent SACHIN with subsequent cardioversion on 8/15, at which time was noted her LVEF had dropped to approximately 35%.  No acute overnight events.  On review of telemetry, the patient has remained in sinus rhythm.  This morning, the patient reports she is feeling well.  She believes she has had reduction in her lower extremity edema.  Her shortness of breath is improved.  She denies any chest pain or chest discomfort.  No palpitations, dizziness, or lightheadedness.  At this time, the patient currently has no complaints.      Review of Systems:   Review of Systems   Constitutional: Negative for chills, diaphoresis, fatigue and fever.   Respiratory: Negative for cough, chest tightness, shortness of breath and wheezing.    Cardiovascular: Positive for leg swelling. Negative for chest pain and palpitations.   Gastrointestinal: Negative for abdominal pain and GERD.   Neurological: Negative for dizziness, syncope and light-headedness.   Psychiatric/Behavioral: Negative for depressed mood. The patient is not nervous/anxious.        I have reviewed and/or updated the patient's past medical, past surgical, family history, social history, problem list and allergies as appropriate in the chart.     Physical Exam:  Vital Signs:   Vitals:    08/18/19 0500 08/18/19 0649 08/18/19 0712 08/18/19 0926   BP:    138/63   BP Location:    Left arm   Patient Position:    Lying   Pulse:  61 60 65   Resp:  18 18 18   Temp:    97.8 °F (36.6 °C)   TempSrc:    Oral   SpO2:  97% 98% 98%   Weight: (!) 144 kg (316 lb 8 oz)      Height:           Physical Exam   Constitutional: She is oriented to person, place, and time. She appears well-developed.    Obese female lying in bed in no acute distress.   HENT:   Head: Normocephalic and atraumatic.   Moist Mucous Membranes.    Eyes: No scleral icterus.   Cardiovascular: Normal rate, regular rhythm, normal heart sounds and intact distal pulses. Exam reveals no gallop and no friction rub.   No murmur heard.  Pulmonary/Chest: Effort normal and breath sounds normal. No stridor. No respiratory distress. She has no wheezes. She has no rales. She exhibits no tenderness.   Abdominal: Soft. Bowel sounds are normal. She exhibits no distension. There is no tenderness. There is no rebound and no guarding.   Obese abdomen.   Musculoskeletal: Normal range of motion. She exhibits edema.   Chronic bilateral lower extremity lymphedema.   Neurological: She is alert and oriented to person, place, and time.   Skin: She is not diaphoretic.   Psychiatric: She has a normal mood and affect. Her behavior is normal.   Nursing note and vitals reviewed.      Results Review:   Results from last 7 days   Lab Units 08/18/19  0637  08/15/19  0454   SODIUM mmol/L 141   < > 141   POTASSIUM mmol/L 4.4   < > 4.5   CHLORIDE mmol/L 102   < > 106   CO2 mmol/L 27.1   < > 23.3   BUN mg/dL 38*   < > 30*   CREATININE mg/dL 2.34*   < > 1.85*   CALCIUM mg/dL 8.8   < > 9.0   BILIRUBIN mg/dL  --   --  0.3   ALK PHOS U/L  --   --  152*   ALT (SGPT) U/L  --   --  11   AST (SGOT) U/L  --   --  15   GLUCOSE mg/dL 194*   < > 238*    < > = values in this interval not displayed.     Results from last 7 days   Lab Units 08/15/19  0454 08/14/19  2317 08/14/19  1743 08/13/19  1745   TROPONIN I ng/mL  --   --   --  <0.30   TROPONIN T ng/mL <0.010 <0.010 <0.010  --      @[email or web address]@  Results from last 7 days   Lab Units 08/16/19  0606 08/15/19  0454 08/13/19  1745   WBC 10*3/mm3 6.28 4.60 3.9*   HEMOGLOBIN g/dL 10.1* 9.7* 10.8*   HEMATOCRIT % 34.0 32.2* 36.1*   PLATELETS 10*3/mm3 145 134* 131*         Results from last 7 days   Lab Units 08/16/19  0607   MAGNESIUM mg/dL 1.8        I personally viewed and interpreted the patient's EKG/Telemetry data     Medications:   )  Current Facility-Administered Medications:   •  acetaminophen (TYLENOL) tablet 650 mg, 650 mg, Oral, Q4H PRN, Elmer Lopez DO  •  amiodarone (PACERONE) tablet 100 mg, 100 mg, Oral, Q24H, Breeding, Kevin SAXENA MD, 100 mg at 08/18/19 0915  •  ammonium lactate (AMLACTIN) 12 % cream 1 application, 1 application, Topical, BID, Elmer Lopez DO, 1 application at 08/17/19 2048  •  apixaban (ELIQUIS) tablet 2.5 mg, 2.5 mg, Oral, Q12H, Quinn Bennett MD, 2.5 mg at 08/18/19 0914  •  aspirin chewable tablet 81 mg, 81 mg, Oral, Daily, Elmer Lopez DO, 81 mg at 08/18/19 0915  •  atorvastatin (LIPITOR) tablet 10 mg, 10 mg, Oral, Nightly, Elmer Lopez DO, 10 mg at 08/17/19 2045  •  bisacodyl (DULCOLAX) suppository 10 mg, 10 mg, Rectal, Daily PRN, Elmer Lopez DO  •  bisoprolol (ZEBeta) tablet 10 mg, 10 mg, Oral, Q24H, Breeding, Kevin SAXENA MD, 10 mg at 08/18/19 0915  •  cefuroxime (CEFTIN) tablet 500 mg, 500 mg, Oral, Q12H, Samson Reyes MD, 500 mg at 08/18/19 0915  •  dextrose (D50W) 25 g/ 50mL Intravenous Solution 25 g, 25 g, Intravenous, Q15 Min PRN, Elmer Lopez, DO  •  dextrose (GLUTOSE) oral gel 1 tube, 1 tube, Oral, Q15 Min PRN, Elmer Lopez DO  •  docusate sodium (COLACE) capsule 100 mg, 100 mg, Oral, BID, Elmer Lopez DO, 100 mg at 08/18/19 0915  •  ferrous sulfate EC tablet 324 mg, 324 mg, Oral, BID With Meals, Elmer Lopez DO, 324 mg at 08/18/19 0915  •  glucagon (human recombinant) (GLUCAGEN DIAGNOSTIC) injection 1 mg, 1 mg, Subcutaneous, PRN, Elmer Lopez,   •  guaiFENesin (MUCINEX) 12 hr tablet 600 mg, 600 mg, Oral, BID, Elmer Lopez, , 600 mg at 08/18/19 0915  •  insulin aspart (novoLOG) injection 0-9 Units, 0-9 Units, Subcutaneous, 4x Daily AC & at Bedtime, Elmer Lopez DO, 2 Units at 08/18/19 0651  •  ipratropium  (ATROVENT) nebulizer solution 0.5 mg, 0.5 mg, Nebulization, 4x Daily - RT, Elmer Lopez, , 0.5 mg at 08/18/19 0649  •  isosorbide mononitrate (IMDUR) 24 hr tablet 15 mg, 15 mg, Oral, Daily, Elmer Lopez, , 15 mg at 08/18/19 0915  •  levothyroxine (SYNTHROID, LEVOTHROID) tablet 150 mcg, 150 mcg, Oral, Q AM, Elmer Lopez, , 150 mcg at 08/18/19 0616  •  lidocaine (LIDODERM) 5 % 1 patch, 1 patch, Transdermal, Q24H, Elmer Lopez DO, 1 patch at 08/17/19 1718  •  metoprolol tartrate (LOPRESSOR) injection 5 mg, 5 mg, Intravenous, Q4H PRN, Elmer Lopez, , 5 mg at 08/15/19 0901  •  multivitamin with minerals 1 tablet, 1 tablet, Oral, Daily, Elmer Lopez DO, 1 tablet at 08/18/19 0914  •  nitroglycerin (NITROSTAT) SL tablet 0.4 mg, 0.4 mg, Sublingual, Q5 Min PRN, Elmer Lopez DO  •  ondansetron (ZOFRAN) injection 4 mg, 4 mg, Intravenous, Q6H PRN, Elmer Lopez DO  •  pantoprazole (PROTONIX) EC tablet 40 mg, 40 mg, Oral, Daily, Elmer Lopez DO, 40 mg at 08/18/19 0915  •  polyethylene glycol (MIRALAX) powder 17 g, 17 g, Oral, BID, Elmer Lopez DO, 17 g at 08/18/19 0915  •  saccharomyces boulardii (FLORASTOR) capsule 250 mg, 250 mg, Oral, BID, Elmer Lopez, , 250 mg at 08/18/19 0914  •  sodium chloride 0.9 % flush 3 mL, 3 mL, Intravenous, Q12H, Elmer Lopez, , 3 mL at 08/17/19 2049  •  sodium chloride 0.9 % flush 3-10 mL, 3-10 mL, Intravenous, PRN, Elmer Lopez DO  •  vitamin C (ASCORBIC ACID) tablet 500 mg, 500 mg, Oral, Daily, Elmer Lopez DO, 500 mg at 08/18/19 0915  •  zinc sulfate (ZINCATE) capsule 220 mg, 220 mg, Oral, BID, Elmer Lopez, , 220 mg at 08/18/19 0914    Assessment / Plan:   1.  Atrial fibrillation with rapid ventricular rate, status post cardioversion on 8/16/2019.  2.  Acute on chronic systolic heart failure with ejection fraction of 35%.  3.  Acute on kidney disease stage III.  4.   Chronic cor pulmonale.  5.  Chronic hypoxic respiratory failure on home oxygen.  6.  Acute bronchitis on antibiotics.  7.  Morbid obesity with a BMI of 50.  8.  Diabetes mellitus type 2 with nephropathy.  9.  Chronic venous insufficiency of the lower extremities.  10.  Anemia of chronic kidney disease.  11.  Chronic lymphedema     The patient remains in sinus rhythm status post successful cardioversion.  Agree with Eliquis dose adjustment given worsening renal function.  Continue amiodarone for rhythm control.  Suspect lower extremity edema is likely multifactorial, chronic venous insufficiency, chronic lymphedema, possible component of volume overload in the setting of reduced LV systolic function.  Given worsening creatinine with attempted diuresis, agree with holding diuresis for now and encouraging gentle p.o. hydration.  In terms of her reduced LV systolic function, continue bisoprolol.  Holding Aldactone given worsening renal function.      Thank you for allowing me to participate in the care of your patient. Please to not hesitate to contact me with additional questions or concerns.     Please note, this document was produced using voice recognition software.       GILMA Neal MD  Interventional Cardiology   08/18/19  11:45 AM

## 2019-08-18 NOTE — PROGRESS NOTES
[facility]    [facility]    Name:  Millicent Mckeon   Age:  59 y.o.  Sex:  female  :  1960  MRN:  [id number]   Visit Number:  [id number]  Admission Date:  2019  Date Of Service:  19  Primary Care Physician:  Marianela Albright APRN     LOS: 4 days :  Patient Care Team:  Marianela Albright APRN as PCP - General (Family Medicine)  Geremias Shepherd MD as Consulting Physician (General Surgery)  Lisa Cardoso MD as Surgeon (General Surgery):    Chief Complaint:      Leg swelling, shortness of air    Subjective / Interval History:     Overnight felt much better.  Denies palpitations.  Has had significant reduction in her cough.    Review of Systems:   Review of Systems   Constitutional: Positive for fatigue. Negative for activity change, appetite change and fever.   HENT: Negative for congestion, ear discharge, ear pain and trouble swallowing.    Eyes: Negative for photophobia and visual disturbance.   Respiratory: Positive for cough and shortness of breath.    Cardiovascular: Positive for leg swelling. Negative for chest pain and palpitations.   Gastrointestinal: Negative for abdominal distention, abdominal pain, constipation, diarrhea, nausea and vomiting.   Endocrine: Negative.    Genitourinary: Negative for dysuria, hematuria and urgency.   Musculoskeletal: Positive for arthralgias. Negative for back pain, joint swelling and myalgias.   Skin: Negative for color change and rash.   Allergic/Immunologic: Negative.    Neurological: Negative for dizziness, weakness, light-headedness and headaches.   Hematological: Negative for adenopathy. Does not bruise/bleed easily.   Psychiatric/Behavioral: Negative for agitation, confusion and dysphoric mood. The patient is not nervous/anxious.          Vital Signs:    Temp:  [97.7 °F (36.5 °C)-98.1 °F (36.7 °C)] 97.8 °F (36.6 °C)  Heart Rate:  [59-65] 65  Resp:  [18-20] 18  BP: (129-152)/(61-87) 138/63    Intake and  output:      Intake/Output Summary (Last 24 hours) at 8/18/2019 0939  Last data filed at 8/18/2019 0600  Gross per 24 hour   Intake 320 ml   Output 2700 ml   Net -2380 ml     No intake/output data recorded.    Physical Examination:  Physical Exam   Constitutional: She is oriented to person, place, and time. She appears well-developed and well-nourished. No distress.   HENT:   Nose: Nose normal.   Mouth/Throat: Oropharynx is clear and moist.   Eyes: Conjunctivae and EOM are normal. No scleral icterus.   Neck: No tracheal deviation present. No thyromegaly present.   Cardiovascular: Normal rate and regular rhythm. Exam reveals no friction rub.   No murmur heard.  Pulmonary/Chest: No respiratory distress. She has no wheezes. She has no rales.   Abdominal: Soft. She exhibits no distension and no mass. There is no tenderness. There is no guarding.   Musculoskeletal: Normal range of motion. She exhibits edema and deformity.   Lymphadenopathy:     She has no cervical adenopathy.   Neurological: She is alert and oriented to person, place, and time. She has normal reflexes. No cranial nerve deficit. Coordination normal.   Skin: Skin is warm and dry. No rash noted. No erythema.   Dry scaly skin on lower extremities   Psychiatric: She has a normal mood and affect. Her behavior is normal. Judgment and thought content normal.         Laboratory results:    Lab Results (last 24 hours)     Procedure Component Value Units Date/Time    Renal Function Panel [[id number]]  (Abnormal) Collected:  08/18/19 0637    Specimen:  Blood Updated:  08/18/19 0729     Glucose 194 mg/dL      BUN 38 mg/dL      Creatinine 2.34 mg/dL      Sodium 141 mmol/L      Potassium 4.4 mmol/L      Chloride 102 mmol/L      CO2 27.1 mmol/L      Calcium 8.8 mg/dL      Albumin 3.70 g/dL      Phosphorus 3.5 mg/dL      Anion Gap 11.9 mmol/L      BUN/Creatinine Ratio 16.2     eGFR Non African Amer 21 mL/min/1.73      eGFR  African Amer 26 mL/min/1.73     Narrative:        GFR Normal >60  Chronic Kidney Disease <60  Kidney Failure <15    POC Glucose Once [[id number]]  (Abnormal) Collected:  08/18/19 0630    Specimen:  Blood Updated:  08/18/19 0641     Glucose 183 mg/dL      Comment: Serial Number: [id number]:  [id number]       POC Glucose Once [[id number]]  (Abnormal) Collected:  08/17/19 2101    Specimen:  Blood Updated:  08/17/19 2115     Glucose 209 mg/dL      Comment: Serial Number: [id number]:  [id number]       POC Glucose Once [[id number]]  (Abnormal) Collected:  08/17/19 1637    Specimen:  Blood Updated:  08/17/19 1647     Glucose 218 mg/dL      Comment: Serial Number: [id number]:  [id number]       Creatinine Clearance - [[id number]] Collected:  08/16/19 2317    Specimen:  24 Hour Urine Updated:  08/17/19 1236    Narrative:       The following orders were created for panel order Creatinine Clearance -.  Procedure                               Abnormality         Status                     ---------                               -----------         ------                     Creatinine Clearance, Ur...[[id number]]  Abnormal            Final result                 Please view results for these tests on the individual orders.    Creatinine Clearance, Urine, 24 Hour - [[id number]]  (Abnormal) Collected:  08/16/19 2317    Specimen:  24 Hour Urine Updated:  08/17/19 1236     Creatinine Clearance 14.6 ml/min      Creatinine, Urine 31.4 mg/dL      Time (Hours) 24 hrs      Creatinine, 24H 0.66 g/24 hr      CREATININE CLEARANCE L/24 HOUR 21.0 L/24 hr      24H Urine Volume 2,100 mL     Acinetobacter Screen - Swab, Arm, Left [[id number]]  (Normal) Collected:  08/14/19 1636    Specimen:  Swab from Arm, Left Updated:  08/17/19 1219     ACINETOBACTER SCREEN CX No Acinetobacter isolated    POC Glucose Once [[id number]]  (Abnormal) Collected:  08/17/19 1159    Specimen:  Blood Updated:  08/17/19 1215     Glucose 195 mg/dL      Comment: Serial Number: [id number]:  [id number]        Creatinine, Urine, 24 Hour - Urine, Clean Catch [[id number]]  (Abnormal) Collected:  08/16/19 2317    Specimen:  24 Hour Urine from Urine, Clean Catch Updated:  08/17/19 1205     Creatinine, 24H 0.62 g/24 hr      Creatinine, Urine 29.5 mg/dL      24H Urine Volume 2,100 mL      Time (Hours) 24 hrs         I have reviewed the patient's laboratory results.    Radiology results:    Imaging Results (last 24 hours)     ** No results found for the last 24 hours. **          I have reviewed the patient's radiology reports.    Medication Review:     I have reviewed the patients active and prn medications.       Assessment & Plan:    Paroxysmal atrial fibrillation with rapid ventricular response (CMS/HCC) telemetry strip overnight reviewed continues to maintain a regular sinus rhythm.  On amiodarone.  Continue with beta-blockers.  Eliquis dose has been adjusted to her renal function    Acquired hypothyroidism recent TSH okay clinically euthyroid    Type 2 diabetes mellitus with nephropathy (CMS/[facility]).  Continue with insulin regimen Accu-Cheks okay    Anemia due to stage 4 chronic kidney disease (CMS/[facility]) on iron supplement follow CBC, iron level    Chronic diastolic congestive heart failure (CMS/[facility])    Pulmonary hypertension (CMS/[facility])    Cor pulmonale, chronic (CMS/[facility])    Lymphedema of both lower extremities continue with local skin care with Lac-Hydrin, leg elevation    Morbid obesity with BMI of 45.0-49.9, adult (CMS/[facility])    A-fib (CMS/[facility])    CKD (chronic kidney disease) stage 3, GFR 30-59 ml/min (CMS/[facility]) creatinine creeping up slowly.  Being followed by nephrology.  Spironolactone has been discontinued.  Potassium level okay.    Acute bronchitis due to other specified organisms.  Respiratory status okay currently stable neb treatments as needed.  Chest x-ray without significant findings.  Discussed plan with patient          Marcelo Reyna MD  08/18/19  9:39 AM

## 2019-08-18 NOTE — PROGRESS NOTES
"   LOS: 4 days    Patient Care Team:  Marianela Albright APRN as PCP - General (Family Medicine)  Geremias Shepherd MD as Consulting Physician (General Surgery)  Lisa Cardoso MD as Surgeon (General Surgery)    Chief Complaint:  No chief complaint on file.    Follow UP FREDDY CKD4  Subjective     Interval History: Wt stable 316.  UOP 2.7L/24h .  Denies dyspnea or n/v      Objective     Vital Signs  Temp:  [97.7 °F (36.5 °C)-98.1 °F (36.7 °C)] 97.8 °F (36.6 °C)  Heart Rate:  [59-65] 65  Resp:  [18-20] 18  BP: (129-152)/(61-87) 138/63    Flowsheet Rows      First Filed Value   Admission Height  170.2 cm (67\") Documented at 08/14/2019 1725   Admission Weight  144 kg (316 lb 9.6 oz)  (Abnormal)  Documented at 08/14/2019 1659          No intake/output data recorded.  I/O last 3 completed shifts:  In: 560 [P.O.:560]  Out: 3100 [Urine:3100]    Intake/Output Summary (Last 24 hours) at 8/18/2019 0954  Last data filed at 8/18/2019 0600  Gross per 24 hour   Intake 320 ml   Output 2700 ml   Net -2380 ml       Physical Exam:  GEN nad, alert  Neck supple no JVD  Lungs CTA bilat no rale  CV RRR no m/g  abd soft NT/ND  vasc 2+ BLE pedal edema with venous stasis changes     Results Review:    Results from last 7 days   Lab Units 08/18/19  0637 08/17/19  0623 08/16/19  0607 08/15/19  0454   SODIUM mmol/L 141 142 141 141   POTASSIUM mmol/L 4.4 4.1 4.5 4.5   CHLORIDE mmol/L 102 104 106 106   CO2 mmol/L 27.1 25.5 24.4 23.3   BUN mg/dL 38* 37* 37* 30*   CREATININE mg/dL 2.34* 2.21* 2.11* 1.85*   CALCIUM mg/dL 8.8 8.8 9.0 9.0   BILIRUBIN mg/dL  --   --   --  0.3   ALK PHOS U/L  --   --   --  152*   ALT (SGPT) U/L  --   --   --  11   AST (SGOT) U/L  --   --   --  15   GLUCOSE mg/dL 194* 199* 176* 238*       Estimated Creatinine Clearance: 37.7 mL/min (A) (by C-G formula based on SCr of 2.34 mg/dL (H)).    Results from last 7 days   Lab Units 08/18/19  0637 08/17/19  0623 08/16/19  0607   MAGNESIUM mg/dL  --   --  1.8   PHOSPHORUS mg/dL 3.5 3.3 " 3.4             Results from last 7 days   Lab Units 08/16/19  0606 08/15/19  0454 08/13/19  1745   WBC 10*3/mm3 6.28 4.60 3.9*   HEMOGLOBIN g/dL 10.1* 9.7* 10.8*   PLATELETS 10*3/mm3 145 134* 131*               Imaging Results (last 24 hours)     ** No results found for the last 24 hours. **          amiodarone 100 mg Oral Q24H   ammonium lactate 1 application Topical BID   apixaban 2.5 mg Oral Q12H   aspirin 81 mg Oral Daily   atorvastatin 10 mg Oral Nightly   bisoprolol 10 mg Oral Q24H   cefuroxime 500 mg Oral Q12H   docusate sodium 100 mg Oral BID   ferrous sulfate 324 mg Oral BID With Meals   guaiFENesin 600 mg Oral BID   insulin aspart 0-9 Units Subcutaneous 4x Daily AC & at Bedtime   ipratropium 0.5 mg Nebulization 4x Daily - RT   isosorbide mononitrate 15 mg Oral Daily   levothyroxine 150 mcg Oral Q AM   lidocaine 1 patch Transdermal Q24H   multivitamin with minerals 1 tablet Oral Daily   pantoprazole 40 mg Oral Daily   polyethylene glycol 17 g Oral BID   saccharomyces boulardii 250 mg Oral BID   sodium chloride 3 mL Intravenous Q12H   torsemide 40 mg Oral Daily   ascorbic acid 500 mg Oral Daily   zinc sulfate 220 mg Oral BID          Medication Review:   Current Facility-Administered Medications   Medication Dose Route Frequency Provider Last Rate Last Dose   • acetaminophen (TYLENOL) tablet 650 mg  650 mg Oral Q4H PRN Elmer Lopez, DO       • amiodarone (PACERONE) tablet 100 mg  100 mg Oral Q24H Kevin Aceves MD   100 mg at 08/18/19 0915   • ammonium lactate (AMLACTIN) 12 % cream 1 application  1 application Topical BID Elmer Lopez, DO   1 application at 08/17/19 2048   • apixaban (ELIQUIS) tablet 2.5 mg  2.5 mg Oral Q12H Quinn Bennett MD   2.5 mg at 08/18/19 0914   • aspirin chewable tablet 81 mg  81 mg Oral Daily Elmer Lopez, DO   81 mg at 08/18/19 0915   • atorvastatin (LIPITOR) tablet 10 mg  10 mg Oral Nightly Elmer Lopez DO   10 mg at 08/17/19 2045   •  bisacodyl (DULCOLAX) suppository 10 mg  10 mg Rectal Daily PRN Elmer Lopez, DO       • bisoprolol (ZEBeta) tablet 10 mg  10 mg Oral Q24H Kevin Aceves MD   10 mg at 08/18/19 0915   • cefuroxime (CEFTIN) tablet 500 mg  500 mg Oral Q12H Samson Reyes MD   500 mg at 08/18/19 0915   • dextrose (D50W) 25 g/ 50mL Intravenous Solution 25 g  25 g Intravenous Q15 Min PRN Elmer Lopez, DO       • dextrose (GLUTOSE) oral gel 1 tube  1 tube Oral Q15 Min PRN Elmer Lopez, DO       • docusate sodium (COLACE) capsule 100 mg  100 mg Oral BID Elmer Lopez, DO   100 mg at 08/18/19 0915   • ferrous sulfate EC tablet 324 mg  324 mg Oral BID With Meals Elmer Lopez DO   324 mg at 08/18/19 0915   • glucagon (human recombinant) (GLUCAGEN DIAGNOSTIC) injection 1 mg  1 mg Subcutaneous PRN Elmer Lopez, DO       • guaiFENesin (MUCINEX) 12 hr tablet 600 mg  600 mg Oral BID Elmer Lopez DO   600 mg at 08/18/19 0915   • insulin aspart (novoLOG) injection 0-9 Units  0-9 Units Subcutaneous 4x Daily AC & at Bedtime Elmer Lopez DO   2 Units at 08/18/19 0651   • ipratropium (ATROVENT) nebulizer solution 0.5 mg  0.5 mg Nebulization 4x Daily - RT Elmer Lopez DO   0.5 mg at 08/18/19 0649   • isosorbide mononitrate (IMDUR) 24 hr tablet 15 mg  15 mg Oral Daily Elmer Lopez DO   15 mg at 08/18/19 0915   • levothyroxine (SYNTHROID, LEVOTHROID) tablet 150 mcg  150 mcg Oral Q AM Elmer Lopez DO   150 mcg at 08/18/19 0616   • lidocaine (LIDODERM) 5 % 1 patch  1 patch Transdermal Q24H Elmer Lopez DO   1 patch at 08/17/19 1718   • metoprolol tartrate (LOPRESSOR) injection 5 mg  5 mg Intravenous Q4H PRN Elmer Lopez,    5 mg at 08/15/19 0901   • multivitamin with minerals 1 tablet  1 tablet Oral Daily Elmer Lopez DO   1 tablet at 08/18/19 0914   • nitroglycerin (NITROSTAT) SL tablet 0.4 mg  0.4 mg Sublingual Q5 Min PRN Elmer Lopez, DO        • ondansetron (ZOFRAN) injection 4 mg  4 mg Intravenous Q6H PRN Elmer Lopez, DO       • pantoprazole (PROTONIX) EC tablet 40 mg  40 mg Oral Daily Elmer Lopez, DO   40 mg at 08/18/19 0915   • polyethylene glycol (MIRALAX) powder 17 g  17 g Oral BID Elmer Lopez, DO   17 g at 08/18/19 0915   • saccharomyces boulardii (FLORASTOR) capsule 250 mg  250 mg Oral BID Elmer Lopez, DO   250 mg at 08/18/19 0914   • sodium chloride 0.9 % flush 3 mL  3 mL Intravenous Q12H Elmer Lopez DO   3 mL at 08/17/19 2049   • sodium chloride 0.9 % flush 3-10 mL  3-10 mL Intravenous PRN Elmer Lopez DO       • torsemide (DEMADEX) tablet 40 mg  40 mg Oral Daily Yola, Kevin SAXENA MD   40 mg at 08/18/19 0915   • vitamin C (ASCORBIC ACID) tablet 500 mg  500 mg Oral Daily Elmer Lopez, DO   500 mg at 08/18/19 0915   • zinc sulfate (ZINCATE) capsule 220 mg  220 mg Oral BID Elmer Lopez, DO   220 mg at 08/18/19 0914       Assessment/Plan   1.   Acute kidney injury - prerenal azotemia, slowly worsening with diuresis, Cr 2.3 today but K/HCo3 stable and no uremic sx.  Na 141 & on fluid restriction.  2.   CKD (chronic kidney disease) stage 4, GFR 15-29 ml/min (CMS/HCC) - GFR worse than SCr indicates (CrCl 15 ml/min on 24h urine)  3.   Anemia due to stage 4 chronic kidney disease (CMS/HCC)  4.   Refractory atrial flutter with RVR  5.   Acute bronchitis present on admission  6.   Chronic diastolic congestive heart failure (CMS/HCC)  7.   Hypothyroidism  8.   Diabetes mellitus (CMS/HCC)  9.   Paroxysmal atrial fibrillation (CMS/HCC): dec'd eliquis 2.5 BID based on GFR  10. Cor pulmonale, chronic (CMS/HCC)  11. Lymphedema of both lower extremities  12. Morbid obesity with BMI of 45.0-49.9, adult (CMS/HCC)    Plan  - Cr slowly rising with diuresis.  Stopped aldactone.  Already received torsemide today, will hold it tomorrow AM pending repeat labs.  Remove fluid restriction and encourage PO  fluids today.          Paroxysmal atrial fibrillation with rapid ventricular response (CMS/[facility])    Acquired hypothyroidism    Type 2 diabetes mellitus with nephropathy (CMS/[facility])    Anemia due to stage 4 chronic kidney disease (CMS/[facility])    Chronic diastolic congestive heart failure (CMS/[facility])    Pulmonary hypertension (CMS/[facility])    Cor pulmonale, chronic (CMS/[facility])    Lymphedema of both lower extremities    Morbid obesity with BMI of 45.0-49.9, adult (CMS/[facility])    A-fib (CMS/[facility])    CKD (chronic kidney disease) stage 3, GFR 30-59 ml/min (CMS/[facility])    Acute bronchitis due to other specified organisms              Quinn Bennett MD  08/18/19  9:54 AM

## 2019-08-18 NOTE — PLAN OF CARE
Problem: Patient Care Overview  Goal: Plan of Care Review  Outcome: Ongoing (interventions implemented as appropriate)   08/18/19 2887   Coping/Psychosocial   Plan of Care Reviewed With patient   Plan of Care Review   Progress improving       Problem: Fall Risk (Adult)  Goal: Absence of Fall  Outcome: Ongoing (interventions implemented as appropriate)      Problem: Skin Injury Risk (Adult)  Goal: Skin Health and Integrity  Outcome: Ongoing (interventions implemented as appropriate)      Problem: Pain, Chronic (Adult)  Goal: Acceptable Pain/Comfort Level and Functional Ability  Outcome: Ongoing (interventions implemented as appropriate)      Problem: Arrhythmia/Dysrhythmia (Symptomatic) (Adult)  Goal: Signs and Symptoms of Listed Potential Problems Will be Absent, Minimized or Managed (Arrhythmia/Dysrhythmia)  Outcome: Ongoing (interventions implemented as appropriate)

## 2019-08-18 NOTE — PLAN OF CARE
Problem: Patient Care Overview  Goal: Plan of Care Review  Outcome: Ongoing (interventions implemented as appropriate)   08/18/19 0512   Coping/Psychosocial   Plan of Care Reviewed With patient   Plan of Care Review   Progress improving       Problem: Fall Risk (Adult)  Goal: Absence of Fall  Outcome: Ongoing (interventions implemented as appropriate)      Problem: Skin Injury Risk (Adult)  Goal: Skin Health and Integrity  Outcome: Ongoing (interventions implemented as appropriate)      Problem: Pain, Chronic (Adult)  Goal: Acceptable Pain/Comfort Level and Functional Ability  Outcome: Ongoing (interventions implemented as appropriate)      Problem: Arrhythmia/Dysrhythmia (Symptomatic) (Adult)  Goal: Signs and Symptoms of Listed Potential Problems Will be Absent, Minimized or Managed (Arrhythmia/Dysrhythmia)  Outcome: Ongoing (interventions implemented as appropriate)

## 2019-08-19 VITALS
HEART RATE: 62 BPM | SYSTOLIC BLOOD PRESSURE: 145 MMHG | TEMPERATURE: 97.4 F | DIASTOLIC BLOOD PRESSURE: 74 MMHG | WEIGHT: 293 LBS | OXYGEN SATURATION: 94 % | HEIGHT: 67 IN | BODY MASS INDEX: 45.99 KG/M2 | RESPIRATION RATE: 18 BRPM

## 2019-08-19 LAB
ALBUMIN SERPL-MCNC: 3.7 G/DL (ref 3.5–5.2)
ANION GAP SERPL CALCULATED.3IONS-SCNC: 14.1 MMOL/L (ref 5–15)
BASOPHILS # BLD AUTO: 0.02 10*3/MM3 (ref 0–0.2)
BASOPHILS NFR BLD AUTO: 0.4 % (ref 0–1.5)
BUN BLD-MCNC: 41 MG/DL (ref 8–23)
BUN/CREAT SERPL: 18.1 (ref 7–25)
CALCIUM SPEC-SCNC: 8.5 MG/DL (ref 8.6–10.5)
CHLORIDE SERPL-SCNC: 106 MMOL/L (ref 98–107)
CO2 SERPL-SCNC: 25.9 MMOL/L (ref 22–29)
CREAT BLD-MCNC: 2.27 MG/DL (ref 0.57–1)
DEPRECATED RDW RBC AUTO: 60.6 FL (ref 37–54)
EOSINOPHIL # BLD AUTO: 0.19 10*3/MM3 (ref 0–0.4)
EOSINOPHIL NFR BLD AUTO: 3.5 % (ref 0.3–6.2)
ERYTHROCYTE [DISTWIDTH] IN BLOOD BY AUTOMATED COUNT: 20 % (ref 12.3–15.4)
GFR SERPL CREATININE-BSD FRML MDRD: 22 ML/MIN/1.73
GFR SERPL CREATININE-BSD FRML MDRD: 27 ML/MIN/1.73
GLUCOSE BLD-MCNC: 148 MG/DL (ref 65–99)
GLUCOSE BLDC GLUCOMTR-MCNC: 156 MG/DL (ref 70–130)
GLUCOSE BLDC GLUCOMTR-MCNC: 207 MG/DL (ref 70–130)
HCT VFR BLD AUTO: 30.8 % (ref 34–46.6)
HGB BLD-MCNC: 9.2 G/DL (ref 12–15.9)
IMM GRANULOCYTES # BLD AUTO: 0.02 10*3/MM3 (ref 0–0.05)
IMM GRANULOCYTES NFR BLD AUTO: 0.4 % (ref 0–0.5)
LYMPHOCYTES # BLD AUTO: 1.09 10*3/MM3 (ref 0.7–3.1)
LYMPHOCYTES NFR BLD AUTO: 20 % (ref 19.6–45.3)
MCH RBC QN AUTO: 24.7 PG (ref 26.6–33)
MCHC RBC AUTO-ENTMCNC: 29.9 G/DL (ref 31.5–35.7)
MCV RBC AUTO: 82.6 FL (ref 79–97)
MONOCYTES # BLD AUTO: 0.44 10*3/MM3 (ref 0.1–0.9)
MONOCYTES NFR BLD AUTO: 8.1 % (ref 5–12)
NEUTROPHILS # BLD AUTO: 3.69 10*3/MM3 (ref 1.7–7)
NEUTROPHILS NFR BLD AUTO: 67.6 % (ref 42.7–76)
NRBC BLD AUTO-RTO: 0 /100 WBC (ref 0–0.2)
PHOSPHATE SERPL-MCNC: 3.4 MG/DL (ref 2.5–4.5)
PLATELET # BLD AUTO: 142 10*3/MM3 (ref 140–450)
PMV BLD AUTO: 10.6 FL (ref 6–12)
POTASSIUM BLD-SCNC: 4.1 MMOL/L (ref 3.5–5.2)
RBC # BLD AUTO: 3.73 10*6/MM3 (ref 3.77–5.28)
SODIUM BLD-SCNC: 146 MMOL/L (ref 136–145)
WBC NRBC COR # BLD: 5.45 10*3/MM3 (ref 3.4–10.8)

## 2019-08-19 PROCEDURE — 63710000001 INSULIN ASPART PER 5 UNITS: Performed by: INTERNAL MEDICINE

## 2019-08-19 PROCEDURE — 80069 RENAL FUNCTION PANEL: CPT | Performed by: INTERNAL MEDICINE

## 2019-08-19 PROCEDURE — 85025 COMPLETE CBC W/AUTO DIFF WBC: CPT | Performed by: INTERNAL MEDICINE

## 2019-08-19 PROCEDURE — 25010000002 ONDANSETRON PER 1 MG: Performed by: INTERNAL MEDICINE

## 2019-08-19 PROCEDURE — 82962 GLUCOSE BLOOD TEST: CPT

## 2019-08-19 PROCEDURE — 99238 HOSP IP/OBS DSCHRG MGMT 30/<: CPT | Performed by: INTERNAL MEDICINE

## 2019-08-19 PROCEDURE — 85007 BL SMEAR W/DIFF WBC COUNT: CPT | Performed by: INTERNAL MEDICINE

## 2019-08-19 RX ORDER — BUMETANIDE 1 MG/1
1 TABLET ORAL DAILY
Status: ON HOLD
Start: 2019-08-19 | End: 2022-03-07 | Stop reason: SDUPTHER

## 2019-08-19 RX ORDER — AMIODARONE HYDROCHLORIDE 100 MG/1
100 TABLET ORAL
Start: 2019-08-20 | End: 2019-11-20 | Stop reason: DRUGHIGH

## 2019-08-19 RX ORDER — BISOPROLOL FUMARATE 10 MG/1
5 TABLET, FILM COATED ORAL DAILY
Start: 2019-08-19 | End: 2019-11-20 | Stop reason: DRUGHIGH

## 2019-08-19 RX ORDER — SPIRONOLACTONE 25 MG/1
25 TABLET ORAL 3 TIMES WEEKLY
Start: 2019-08-19 | End: 2022-04-14 | Stop reason: HOSPADM

## 2019-08-19 RX ADMIN — DOCUSATE SODIUM 100 MG: 100 CAPSULE, LIQUID FILLED ORAL at 09:49

## 2019-08-19 RX ADMIN — ISOSORBIDE MONONITRATE 15 MG: 30 TABLET, EXTENDED RELEASE ORAL at 09:49

## 2019-08-19 RX ADMIN — INSULIN ASPART 4 UNITS: 100 INJECTION, SOLUTION INTRAVENOUS; SUBCUTANEOUS at 06:35

## 2019-08-19 RX ADMIN — MULTIPLE VITAMINS W/ MINERALS TAB 1 TABLET: TAB at 09:49

## 2019-08-19 RX ADMIN — GUAIFENESIN 600 MG: 600 TABLET, EXTENDED RELEASE ORAL at 09:48

## 2019-08-19 RX ADMIN — Medication 220 MG: at 09:49

## 2019-08-19 RX ADMIN — Medication 1 APPLICATION: at 09:50

## 2019-08-19 RX ADMIN — AMIODARONE HYDROCHLORIDE 100 MG: 200 TABLET ORAL at 09:49

## 2019-08-19 RX ADMIN — INSULIN ASPART 2 UNITS: 100 INJECTION, SOLUTION INTRAVENOUS; SUBCUTANEOUS at 12:52

## 2019-08-19 RX ADMIN — FERROUS SULFATE TAB EC 324 MG (65 MG FE EQUIVALENT) 324 MG: 324 (65 FE) TABLET DELAYED RESPONSE at 09:48

## 2019-08-19 RX ADMIN — PANTOPRAZOLE SODIUM 40 MG: 40 TABLET, DELAYED RELEASE ORAL at 09:48

## 2019-08-19 RX ADMIN — APIXABAN 2.5 MG: 2.5 TABLET, FILM COATED ORAL at 09:49

## 2019-08-19 RX ADMIN — LEVOTHYROXINE SODIUM 150 MCG: 150 TABLET ORAL at 06:34

## 2019-08-19 RX ADMIN — Medication 250 MG: at 09:49

## 2019-08-19 RX ADMIN — ONDANSETRON 4 MG: 2 INJECTION INTRAMUSCULAR; INTRAVENOUS at 02:53

## 2019-08-19 RX ADMIN — SODIUM CHLORIDE, PRESERVATIVE FREE 3 ML: 5 INJECTION INTRAVENOUS at 09:51

## 2019-08-19 RX ADMIN — OXYCODONE HYDROCHLORIDE AND ACETAMINOPHEN 500 MG: 500 TABLET ORAL at 09:49

## 2019-08-19 RX ADMIN — BISOPROLOL FUMARATE 10 MG: 5 TABLET ORAL at 09:48

## 2019-08-19 RX ADMIN — ASPIRIN 81 MG 81 MG: 81 TABLET ORAL at 09:49

## 2019-08-19 NOTE — PROGRESS NOTES
Continued Stay Note   [facility]     Patient Name: Millicent Mckeon  MRN: [id number]  Today's Date: 8/19/2019    Admit Date: 8/14/2019    Discharge Plan     Row Name 08/19/19 1221       Plan    Plan Comments  Called Carla at [facility] she may return there Faxed DC summary to them  Called her sister  Lov[facility] Mckeon nursing to call report         Discharge Codes    No documentation.       Expected Discharge Date and Time     Expected Discharge Date Expected Discharge Time    Aug 19, 2019             Azalia Mcfadden RN

## 2019-08-19 NOTE — PROGRESS NOTES
"Nephrology Progress Note.    LOS: 5 days    Patient Care Team:  Marianela Albright APRN as PCP - General (Family Medicine)  Geremias Shepherd MD as Consulting Physician (General Surgery)  Lisa Cardoso MD as Surgeon (General Surgery)    Chief Complaint:  No chief complaint on file.      Subjective:     Follow up for FREDDY and Chronic Kidney disease stage 4 / Anemia.     Interval History:   Patient Complaints: none  Patient seen and examined this morning.  Events from last night noted.  Patient denies having any fevers chills.  No nausea or vomiting no abdominal pain.  Denies any chest pain, shortness of breath or cough and sputum production.  There is no significant edema.   Patient also denies having new onset weakness of numbness of either extremity  History taken from: patient    I have reviewed all of the labs since admission and over the weekend, imaging records, from this hospitalization.  I also reviewed ER staff and admitting/attending/ consulting physicians H/P's, consult notes and progress notes to establish a comprehensive understanding of this patient's clinical hospital course, as well as to establish plan of care appropriately.   Objective:    Vital Signs  /74 (BP Location: Right arm, Patient Position: Lying)   Pulse 62   Temp 97.4 °F (36.3 °C) (Oral)   Resp 18   Ht 170.2 cm (67\")   Wt (!) 145 kg (318 lb 14.4 oz)   SpO2 94%   BMI 49.95 kg/m²       No intake/output data recorded.    Intake/Output Summary (Last 24 hours) at 8/19/2019 1026  Last data filed at 8/19/2019 0633  Gross per 24 hour   Intake 480 ml   Output 800 ml   Net -320 ml       Physical Exam:  General Appearance: alert, oriented x 3, no acute distress,   HEENT: Oral mucosa dry, extra occular movements intact. Sclera clear.  Skin: Warm and dry  Neck: supple, no JVD, trachea midline  Lungs:Chest shape is normal. Breath sounds heard bilaterally equally. No crackles, No wheezing.   Heart: Irregular rate and rhythm. normal S1 and S2, " no S3, no rub, peripheral pulses weak but palpable.  Abdomen: Obese, soft, non-tender,  present bowel sounds to auscultation  : no palpable bladder.  Extremities: 1+ edema, no cyanosis or clubbing.   Neuro: normal speech and mental status, grossly non focal.     Results Review:    Results from last 7 days   Lab Units 08/19/19 0522 08/18/19  0637 08/17/19  0623  08/15/19  0454   SODIUM mmol/L 146* 141 142   < > 141   POTASSIUM mmol/L 4.1 4.4 4.1   < > 4.5   CHLORIDE mmol/L 106 102 104   < > 106   CO2 mmol/L 25.9 27.1 25.5   < > 23.3   BUN mg/dL 41* 38* 37*   < > 30*   CREATININE mg/dL 2.27* 2.34* 2.21*   < > 1.85*   CALCIUM mg/dL 8.5* 8.8 8.8   < > 9.0   BILIRUBIN mg/dL  --   --   --   --  0.3   ALK PHOS U/L  --   --   --   --  152*   ALT (SGPT) U/L  --   --   --   --  11   AST (SGOT) U/L  --   --   --   --  15   GLUCOSE mg/dL 148* 194* 199*   < > 238*    < > = values in this interval not displayed.       Estimated Creatinine Clearance: 39 mL/min (A) (by C-G formula based on SCr of 2.27 mg/dL (H)).    Results from last 7 days   Lab Units 08/19/19 0522 08/18/19  0637 08/17/19  0623 08/16/19  0607   MAGNESIUM mg/dL  --   --   --  1.8   PHOSPHORUS mg/dL 3.4 3.5 3.3 3.4             Results from last 7 days   Lab Units 08/19/19  0522 08/16/19  0606 08/15/19  0454 08/13/19  1745   WBC 10*3/mm3 5.45 6.28 4.60 3.9*   HEMOGLOBIN g/dL 9.2* 10.1* 9.7* 10.8*   PLATELETS 10*3/mm3 142 145 134* 131*               Imaging Results (last 24 hours)     ** No results found for the last 24 hours. **          amiodarone 100 mg Oral Q24H   ammonium lactate 1 application Topical BID   apixaban 2.5 mg Oral Q12H   aspirin 81 mg Oral Daily   atorvastatin 10 mg Oral Nightly   bisoprolol 10 mg Oral Q24H   docusate sodium 100 mg Oral BID   ferrous sulfate 324 mg Oral BID With Meals   guaiFENesin 600 mg Oral BID   insulin aspart 0-9 Units Subcutaneous 4x Daily AC & at Bedtime   ipratropium 0.5 mg Nebulization 4x Daily - RT   isosorbide  mononitrate 15 mg Oral Daily   levothyroxine 150 mcg Oral Q AM   lidocaine 1 patch Transdermal Q24H   multivitamin with minerals 1 tablet Oral Daily   pantoprazole 40 mg Oral Daily   polyethylene glycol 17 g Oral BID   saccharomyces boulardii 250 mg Oral BID   sodium chloride 3 mL Intravenous Q12H   ascorbic acid 500 mg Oral Daily   zinc sulfate 220 mg Oral BID          Medication Review:   Current Facility-Administered Medications   Medication Dose Route Frequency Provider Last Rate Last Dose   • acetaminophen (TYLENOL) tablet 650 mg  650 mg Oral Q4H PRN Elmer Lopez, DO       • amiodarone (PACERONE) tablet 100 mg  100 mg Oral Q24H Kevin Aceves MD   100 mg at 08/19/19 0949   • ammonium lactate (AMLACTIN) 12 % cream 1 application  1 application Topical BID Elmer Lopez, DO   1 application at 08/19/19 0950   • apixaban (ELIQUIS) tablet 2.5 mg  2.5 mg Oral Q12H Quinn Bennett MD   2.5 mg at 08/19/19 0949   • aspirin chewable tablet 81 mg  81 mg Oral Daily Elmer Lopez, DO   81 mg at 08/19/19 0949   • atorvastatin (LIPITOR) tablet 10 mg  10 mg Oral Nightly Elmer Lopez DO   10 mg at 08/18/19 2110   • bisacodyl (DULCOLAX) suppository 10 mg  10 mg Rectal Daily PRN Elmer Lopez, DO       • bisoprolol (ZEBeta) tablet 10 mg  10 mg Oral Q24H Kevin Aceves MD   10 mg at 08/19/19 0948   • dextrose (D50W) 25 g/ 50mL Intravenous Solution 25 g  25 g Intravenous Q15 Min PRN Elmer Lopez, DO       • dextrose (GLUTOSE) oral gel 1 tube  1 tube Oral Q15 Min PRN Elmer Lopez, DO       • docusate sodium (COLACE) capsule 100 mg  100 mg Oral BID Elmer Lopez, DO   100 mg at 08/19/19 0949   • ferrous sulfate EC tablet 324 mg  324 mg Oral BID With Meals Elmer Lopez, DO   324 mg at 08/19/19 0948   • glucagon (human recombinant) (GLUCAGEN DIAGNOSTIC) injection 1 mg  1 mg Subcutaneous PRN Elmer Lopez, DO       • guaiFENesin (MUCINEX) 12 hr tablet 600  mg  600 mg Oral BID Elmer Lopez, DO   600 mg at 08/19/19 0948   • insulin aspart (novoLOG) injection 0-9 Units  0-9 Units Subcutaneous 4x Daily AC & at Bedtime Elmer Lopez, DO   4 Units at 08/19/19 0635   • ipratropium (ATROVENT) nebulizer solution 0.5 mg  0.5 mg Nebulization 4x Daily - RT Elmer Lopez, DO   0.5 mg at 08/18/19 1943   • isosorbide mononitrate (IMDUR) 24 hr tablet 15 mg  15 mg Oral Daily Elmer Lopez, DO   15 mg at 08/19/19 0949   • levothyroxine (SYNTHROID, LEVOTHROID) tablet 150 mcg  150 mcg Oral Q AM Elmer Lopez, DO   150 mcg at 08/19/19 0634   • lidocaine (LIDODERM) 5 % 1 patch  1 patch Transdermal Q24H Elmer Lopez DO   1 patch at 08/18/19 1808   • metoprolol tartrate (LOPRESSOR) injection 5 mg  5 mg Intravenous Q4H PRN Elmer Lopez, DO   5 mg at 08/15/19 0901   • multivitamin with minerals 1 tablet  1 tablet Oral Daily Elmer Lopez DO   1 tablet at 08/19/19 0949   • nitroglycerin (NITROSTAT) SL tablet 0.4 mg  0.4 mg Sublingual Q5 Min PRN Elmer Lopez, DO       • ondansetron (ZOFRAN) injection 4 mg  4 mg Intravenous Q6H PRN Elmer Lopez, DO   4 mg at 08/19/19 0253   • pantoprazole (PROTONIX) EC tablet 40 mg  40 mg Oral Daily Elmer Lopez, DO   40 mg at 08/19/19 0948   • polyethylene glycol (MIRALAX) powder 17 g  17 g Oral BID Elmer Lopez DO   17 g at 08/18/19 2113   • saccharomyces boulardii (FLORASTOR) capsule 250 mg  250 mg Oral BID Elmer Lopez, DO   250 mg at 08/19/19 0949   • sodium chloride 0.9 % flush 3 mL  3 mL Intravenous Q12H Elmer Lopez, DO   3 mL at 08/19/19 0951   • sodium chloride 0.9 % flush 3-10 mL  3-10 mL Intravenous PRN Elmer Lopez,        • vitamin C (ASCORBIC ACID) tablet 500 mg  500 mg Oral Daily Elmer Lopez,    500 mg at 08/19/19 0949   • zinc sulfate (ZINCATE) capsule 220 mg  220 mg Oral BID Elmer Lopez,    220 mg at 08/19/19 0949        Assessment/Plan:  1.   Acute kidney injury  2.   CKD (chronic kidney disease) stage 4, GFR 15-29 ml/min (CMS/[facility])  3.   Anemia due to stage 4 chronic kidney disease (CMS/HCC)  4.   Refractory atrial flutter with RVR  5.   Acute bronchitis present on admission  6.   Chronic diastolic congestive heart failure (CMS/[facility])  7.   Hypothyroidism  8.   Diabetes mellitus (CMS/HCC)  9.   Paroxysmal atrial fibrillation (CMS/HCC)  10. Cor pulmonale, chronic (CMS/[facility])  11. Lymphedema of both lower extremities  12. Morbid obesity with BMI of 45.0-49.9, adult (CMS/[facility])      Plan:  · Her volume status appears to be fairly stable at this point.  · Clinically she appears to be improving.  · She probably can be discharged from renal standpoint with a close follow-up.  · I will send her home with 1 mg of Bumex daily along with Aldactone 25 mg every other day.  · We will still need to be evaluated for iron stores as well as erythropoietin therapy as an outpatient.  · Details were discussed with the patient no family in the room.    · Details were also discussed with the hospitalist service.  Follow-up with me in the office within 1 week of discharge.  · Surveillance labs.  · Further recommendations will depend on clinical course of the patient during the current hospitalization.    · I also discussed the details with the nursing staff.  · Rest as ordered.    Milad Leone MD, GILA  08/19/19  10:26 AM    Dictated utilizing Dragon dictation.

## 2019-08-19 NOTE — PROGRESS NOTES
Case Management Discharge Note    Final Note: trnsferred back to [facility]     Destination - Selection Complete      Service Provider Request Status Selected Services Address Phone Number Fax Number    [facility] Selected Skilled Nursing [address], [facility] [address] [phone]      Durable Medical Equipment      No service has been selected for the patient.      Dialysis/Infusion      No service has been selected for the patient.      Home Medical Care      No service has been selected for the patient.      Therapy      No service has been selected for the patient.      Community Resources      No service has been selected for the patient.        Transportation Services  Ambulance: [facility]    Final Discharge Disposition Code: 03 - skilled nursing facility (SNF)

## 2019-08-19 NOTE — PLAN OF CARE
Problem: Patient Care Overview  Goal: Plan of Care Review  Outcome: Ongoing (interventions implemented as appropriate)   08/19/19 0326   Coping/Psychosocial   Plan of Care Reviewed With patient   Plan of Care Review   Progress improving   OTHER   Outcome Summary Pt continues to improve. Anticipate discharge to [facility] tomorrow. BLE large with edema and rough dry skin on lower legs. Labs monitored daily. Telemetry monitoring continued.     Goal: Individualization and Mutuality  Outcome: Ongoing (interventions implemented as appropriate)    Goal: Discharge Needs Assessment  Outcome: Ongoing (interventions implemented as appropriate)      Problem: Fall Risk (Adult)  Goal: Absence of Fall  Outcome: Ongoing (interventions implemented as appropriate)      Problem: Skin Injury Risk (Adult)  Goal: Skin Health and Integrity  Outcome: Ongoing (interventions implemented as appropriate)      Problem: Pain, Chronic (Adult)  Goal: Acceptable Pain/Comfort Level and Functional Ability  Outcome: Ongoing (interventions implemented as appropriate)      Problem: Arrhythmia/Dysrhythmia (Symptomatic) (Adult)  Goal: Signs and Symptoms of Listed Potential Problems Will be Absent, Minimized or Managed (Arrhythmia/Dysrhythmia)  Outcome: Ongoing (interventions implemented as appropriate)

## 2019-08-21 RX ORDER — GLIPIZIDE 10 MG/1
TABLET ORAL
Qty: 360 TABLET | Refills: 5 | OUTPATIENT
Start: 2019-08-21

## 2019-08-23 ENCOUNTER — HOSPITAL ENCOUNTER (OUTPATIENT)
Facility: HOSPITAL | Age: 59
Discharge: HOME | End: 2019-08-23
Payer: MEDICARE

## 2019-08-23 ENCOUNTER — OFFICE VISIT (OUTPATIENT)
Dept: PRIMARY CARE CLINIC | Age: 59
End: 2019-08-23
Payer: MEDICARE

## 2019-08-23 DIAGNOSIS — E66.01 MORBID OBESITY (HCC): ICD-10-CM

## 2019-08-23 DIAGNOSIS — E11.8 TYPE 2 DIABETES MELLITUS WITH COMPLICATION, UNSPECIFIED WHETHER LONG TERM INSULIN USE: ICD-10-CM

## 2019-08-23 DIAGNOSIS — I48.91 ATRIAL FIBRILLATION, UNSPECIFIED TYPE (HCC): Primary | ICD-10-CM

## 2019-08-23 DIAGNOSIS — R53.81 DECLINING FUNCTIONAL STATUS: ICD-10-CM

## 2019-08-23 DIAGNOSIS — I10 ESSENTIAL HYPERTENSION: ICD-10-CM

## 2019-08-23 DIAGNOSIS — N18.30 STAGE 3 CHRONIC KIDNEY DISEASE (HCC): ICD-10-CM

## 2019-08-23 LAB
ALBUMIN SERPL-MCNC: 3.8 G/DL (ref 3.4–4.8)
ANION GAP SERPL CALCULATED.3IONS-SCNC: 11 MMOL/L (ref 3–16)
BUN BLDV-MCNC: 30 MG/DL (ref 6–20)
CALCIUM SERPL-MCNC: 9.2 MG/DL (ref 8.5–10.5)
CHLORIDE BLD-SCNC: 98 MMOL/L (ref 98–107)
CO2: 30 MMOL/L (ref 20–30)
CREAT SERPL-MCNC: 1.8 MG/DL (ref 0.4–1.2)
GFR AFRICAN AMERICAN: 35
GFR NON-AFRICAN AMERICAN: 29
GLUCOSE BLD-MCNC: 165 MG/DL (ref 74–106)
PHOSPHORUS: 2.9 MG/DL (ref 2.5–4.5)
POTASSIUM SERPL-SCNC: 4.3 MMOL/L (ref 3.4–5.1)
SODIUM BLD-SCNC: 139 MMOL/L (ref 136–145)

## 2019-08-23 PROCEDURE — 80069 RENAL FUNCTION PANEL: CPT

## 2019-08-23 PROCEDURE — 3045F PR MOST RECENT HEMOGLOBIN A1C LEVEL 7.0-9.0%: CPT | Performed by: INTERNAL MEDICINE

## 2019-08-23 PROCEDURE — 99318 PR E/M ANNUAL NURSING FACILITY ASSESS STABLE 30 MIN: CPT | Performed by: INTERNAL MEDICINE

## 2019-08-26 ENCOUNTER — HOSPITAL ENCOUNTER (OUTPATIENT)
Facility: HOSPITAL | Age: 59
Discharge: HOME | End: 2019-08-26
Payer: MEDICARE

## 2019-08-26 LAB
ALBUMIN SERPL-MCNC: 3.8 G/DL (ref 3.4–4.8)
ANION GAP SERPL CALCULATED.3IONS-SCNC: 14 MMOL/L (ref 3–16)
BUN BLDV-MCNC: 24 MG/DL (ref 6–20)
CALCIUM SERPL-MCNC: 9.6 MG/DL (ref 8.5–10.5)
CHLORIDE BLD-SCNC: 100 MMOL/L (ref 98–107)
CO2: 27 MMOL/L (ref 20–30)
CREAT SERPL-MCNC: 1.5 MG/DL (ref 0.4–1.2)
GFR AFRICAN AMERICAN: 43
GFR NON-AFRICAN AMERICAN: 35
GLUCOSE BLD-MCNC: 192 MG/DL (ref 74–106)
PHOSPHORUS: 3.1 MG/DL (ref 2.5–4.5)
POTASSIUM SERPL-SCNC: 4.4 MMOL/L (ref 3.4–5.1)
SODIUM BLD-SCNC: 141 MMOL/L (ref 136–145)

## 2019-08-26 PROCEDURE — 80069 RENAL FUNCTION PANEL: CPT

## 2019-09-05 VITALS
RESPIRATION RATE: 20 BRPM | TEMPERATURE: 98.4 F | HEART RATE: 88 BPM | DIASTOLIC BLOOD PRESSURE: 98 MMHG | SYSTOLIC BLOOD PRESSURE: 160 MMHG

## 2019-09-06 PROBLEM — N17.9 ARF (ACUTE RENAL FAILURE) (HCC): Status: RESOLVED | Noted: 2019-04-16 | Resolved: 2019-09-06

## 2019-09-06 PROBLEM — N30.00 ACUTE CYSTITIS WITHOUT HEMATURIA: Status: RESOLVED | Noted: 2019-06-03 | Resolved: 2019-09-06

## 2019-09-06 PROBLEM — I48.91 ATRIAL FIBRILLATION (HCC): Status: ACTIVE | Noted: 2019-09-06

## 2019-09-06 PROBLEM — J20.9 ACUTE BRONCHITIS: Status: RESOLVED | Noted: 2019-08-14 | Resolved: 2019-09-06

## 2019-09-10 ENCOUNTER — HOSPITAL ENCOUNTER (OUTPATIENT)
Facility: HOSPITAL | Age: 59
Discharge: HOME | End: 2019-09-10
Payer: MEDICARE

## 2019-09-10 LAB
ALBUMIN SERPL-MCNC: 3.8 G/DL (ref 3.4–4.8)
ANION GAP SERPL CALCULATED.3IONS-SCNC: 12 MMOL/L (ref 3–16)
BUN BLDV-MCNC: 33 MG/DL (ref 6–20)
CALCIUM SERPL-MCNC: 9.5 MG/DL (ref 8.5–10.5)
CHLORIDE BLD-SCNC: 98 MMOL/L (ref 98–107)
CO2: 32 MMOL/L (ref 20–30)
CREAT SERPL-MCNC: 2.1 MG/DL (ref 0.4–1.2)
GFR AFRICAN AMERICAN: 29
GFR NON-AFRICAN AMERICAN: 24
GLUCOSE BLD-MCNC: 218 MG/DL (ref 74–106)
PHOSPHORUS: 3.6 MG/DL (ref 2.5–4.5)
POTASSIUM SERPL-SCNC: 4.1 MMOL/L (ref 3.4–5.1)
SODIUM BLD-SCNC: 142 MMOL/L (ref 136–145)

## 2019-09-10 PROCEDURE — 80069 RENAL FUNCTION PANEL: CPT

## 2019-10-03 ENCOUNTER — HOSPITAL ENCOUNTER (OUTPATIENT)
Facility: HOSPITAL | Age: 59
Discharge: HOME | End: 2019-10-03
Payer: MEDICARE

## 2019-10-03 LAB
ALBUMIN SERPL-MCNC: 3.9 G/DL (ref 3.4–4.8)
ANION GAP SERPL CALCULATED.3IONS-SCNC: 15 MMOL/L (ref 3–16)
BASOPHILS ABSOLUTE: 0 K/UL (ref 0–0.1)
BASOPHILS RELATIVE PERCENT: 0.7 %
BUN BLDV-MCNC: 98 MG/DL (ref 6–20)
CALCIUM SERPL-MCNC: 10.1 MG/DL (ref 8.5–10.5)
CHLORIDE BLD-SCNC: 96 MMOL/L (ref 98–107)
CO2: 25 MMOL/L (ref 20–30)
CREAT SERPL-MCNC: 2.6 MG/DL (ref 0.4–1.2)
EOSINOPHILS ABSOLUTE: 0.3 K/UL (ref 0–0.4)
EOSINOPHILS RELATIVE PERCENT: 5.6 %
GFR AFRICAN AMERICAN: 23
GFR NON-AFRICAN AMERICAN: 19
GLUCOSE BLD-MCNC: 330 MG/DL (ref 74–106)
HCT VFR BLD CALC: 32.9 % (ref 37–47)
HEMOGLOBIN: 10.2 G/DL (ref 11.5–16.5)
IMMATURE GRANULOCYTES #: 0 K/UL
IMMATURE GRANULOCYTES %: 0.4 % (ref 0–5)
IRON: 89 UG/DL (ref 37–145)
LYMPHOCYTES ABSOLUTE: 1.6 K/UL (ref 1.5–4)
LYMPHOCYTES RELATIVE PERCENT: 27.4 %
MCH RBC QN AUTO: 25.2 PG (ref 27–32)
MCHC RBC AUTO-ENTMCNC: 31 G/DL (ref 31–35)
MCV RBC AUTO: 81.4 FL (ref 80–100)
MONOCYTES ABSOLUTE: 0.4 K/UL (ref 0.2–0.8)
MONOCYTES RELATIVE PERCENT: 7.6 %
NEUTROPHILS ABSOLUTE: 3.3 K/UL (ref 2–7.5)
NEUTROPHILS RELATIVE PERCENT: 58.3 %
PDW BLD-RTO: 18.5 % (ref 11–16)
PHOSPHORUS: 5.3 MG/DL (ref 2.5–4.5)
PLATELET # BLD: 178 K/UL (ref 150–400)
PMV BLD AUTO: 11 FL (ref 6–10)
POTASSIUM SERPL-SCNC: 5.4 MMOL/L (ref 3.4–5.1)
RBC # BLD: 4.04 M/UL (ref 3.8–5.8)
SODIUM BLD-SCNC: 136 MMOL/L (ref 136–145)
TOTAL IRON BINDING CAPACITY: 300 UG/DL (ref 250–450)
URIC ACID, SERUM: 7.2 MG/DL (ref 2.5–7.1)
WBC # BLD: 5.7 K/UL (ref 4–11)

## 2019-10-03 PROCEDURE — 80069 RENAL FUNCTION PANEL: CPT

## 2019-10-03 PROCEDURE — 83540 ASSAY OF IRON: CPT

## 2019-10-03 PROCEDURE — 84550 ASSAY OF BLOOD/URIC ACID: CPT

## 2019-10-03 PROCEDURE — 83550 IRON BINDING TEST: CPT

## 2019-10-03 PROCEDURE — 83970 ASSAY OF PARATHORMONE: CPT

## 2019-10-03 PROCEDURE — 85025 COMPLETE CBC W/AUTO DIFF WBC: CPT

## 2019-10-04 LAB — PARATHYROID HORMONE INTACT: 23.4 PG/ML (ref 14–72)

## 2019-10-14 ENCOUNTER — HOSPITAL ENCOUNTER (OUTPATIENT)
Facility: HOSPITAL | Age: 59
Discharge: HOME | End: 2019-10-14
Payer: MEDICARE

## 2019-10-14 LAB
ALBUMIN SERPL-MCNC: 3.9 G/DL (ref 3.4–4.8)
ANION GAP SERPL CALCULATED.3IONS-SCNC: 16 MMOL/L (ref 3–16)
BUN BLDV-MCNC: 78 MG/DL (ref 6–20)
CALCIUM SERPL-MCNC: 9.9 MG/DL (ref 8.5–10.5)
CHLORIDE BLD-SCNC: 101 MMOL/L (ref 98–107)
CO2: 22 MMOL/L (ref 20–30)
CREAT SERPL-MCNC: 2.1 MG/DL (ref 0.4–1.2)
GFR AFRICAN AMERICAN: 29
GFR NON-AFRICAN AMERICAN: 24
GLUCOSE BLD-MCNC: 262 MG/DL (ref 74–106)
PHOSPHORUS: 4.5 MG/DL (ref 2.5–4.5)
POTASSIUM SERPL-SCNC: 5.4 MMOL/L (ref 3.4–5.1)
SODIUM BLD-SCNC: 139 MMOL/L (ref 136–145)

## 2019-10-14 PROCEDURE — 80069 RENAL FUNCTION PANEL: CPT

## 2019-10-21 ENCOUNTER — OFFICE VISIT (OUTPATIENT)
Dept: PRIMARY CARE CLINIC | Age: 59
End: 2019-10-21

## 2019-10-21 PROCEDURE — 99999 PR OFFICE/OUTPT VISIT,PROCEDURE ONLY: CPT | Performed by: INTERNAL MEDICINE

## 2019-11-02 ENCOUNTER — HOSPITAL ENCOUNTER (OUTPATIENT)
Facility: HOSPITAL | Age: 59
Discharge: HOME | End: 2019-11-02
Payer: MEDICARE

## 2019-11-02 LAB
ALBUMIN SERPL-MCNC: 3.8 G/DL (ref 3.4–4.8)
ANION GAP SERPL CALCULATED.3IONS-SCNC: 16 MMOL/L (ref 3–16)
BUN BLDV-MCNC: 45 MG/DL (ref 6–20)
CALCIUM SERPL-MCNC: 10 MG/DL (ref 8.5–10.5)
CHLORIDE BLD-SCNC: 103 MMOL/L (ref 98–107)
CO2: 20 MMOL/L (ref 20–30)
CREAT SERPL-MCNC: 1.9 MG/DL (ref 0.4–1.2)
GFR AFRICAN AMERICAN: 32
GFR NON-AFRICAN AMERICAN: 27
GLUCOSE BLD-MCNC: 181 MG/DL (ref 74–106)
PHOSPHORUS: 4.5 MG/DL (ref 2.5–4.5)
POTASSIUM SERPL-SCNC: 6.1 MMOL/L (ref 3.4–5.1)
SODIUM BLD-SCNC: 139 MMOL/L (ref 136–145)
URIC ACID, SERUM: 5.5 MG/DL (ref 2.5–7.1)

## 2019-11-02 PROCEDURE — 80069 RENAL FUNCTION PANEL: CPT

## 2019-11-02 PROCEDURE — 84550 ASSAY OF BLOOD/URIC ACID: CPT

## 2019-11-02 PROCEDURE — 36415 COLL VENOUS BLD VENIPUNCTURE: CPT

## 2019-11-03 ENCOUNTER — HOSPITAL ENCOUNTER (OUTPATIENT)
Facility: HOSPITAL | Age: 59
Discharge: HOME | End: 2019-11-03
Payer: MEDICARE

## 2019-11-03 LAB
A/G RATIO: 1.3 (ref 0.8–2)
ALBUMIN SERPL-MCNC: 3.8 G/DL (ref 3.4–4.8)
ALP BLD-CCNC: 115 U/L (ref 25–100)
ALT SERPL-CCNC: 13 U/L (ref 4–36)
ANION GAP SERPL CALCULATED.3IONS-SCNC: 15 MMOL/L (ref 3–16)
AST SERPL-CCNC: 14 U/L (ref 8–33)
BASOPHILS ABSOLUTE: 0 K/UL (ref 0–0.1)
BASOPHILS RELATIVE PERCENT: 0.3 %
BILIRUB SERPL-MCNC: <0.2 MG/DL (ref 0.3–1.2)
BUN BLDV-MCNC: 45 MG/DL (ref 6–20)
CALCIUM SERPL-MCNC: 9.4 MG/DL (ref 8.5–10.5)
CHLORIDE BLD-SCNC: 102 MMOL/L (ref 98–107)
CO2: 23 MMOL/L (ref 20–30)
CREAT SERPL-MCNC: 1.9 MG/DL (ref 0.4–1.2)
EOSINOPHILS ABSOLUTE: 0.3 K/UL (ref 0–0.4)
EOSINOPHILS RELATIVE PERCENT: 4.3 %
GFR AFRICAN AMERICAN: 32
GFR NON-AFRICAN AMERICAN: 27
GLOBULIN: 2.9 G/DL
GLUCOSE BLD-MCNC: 242 MG/DL (ref 74–106)
HCT VFR BLD CALC: 30 % (ref 37–47)
HEMOGLOBIN: 9.5 G/DL (ref 11.5–16.5)
IMMATURE GRANULOCYTES #: 0 K/UL
IMMATURE GRANULOCYTES %: 0.2 % (ref 0–5)
LYMPHOCYTES ABSOLUTE: 1.8 K/UL (ref 1.5–4)
LYMPHOCYTES RELATIVE PERCENT: 31.3 %
MCH RBC QN AUTO: 26 PG (ref 27–32)
MCHC RBC AUTO-ENTMCNC: 31.7 G/DL (ref 31–35)
MCV RBC AUTO: 82.2 FL (ref 80–100)
MONOCYTES ABSOLUTE: 0.5 K/UL (ref 0.2–0.8)
MONOCYTES RELATIVE PERCENT: 8.2 %
NEUTROPHILS ABSOLUTE: 3.3 K/UL (ref 2–7.5)
NEUTROPHILS RELATIVE PERCENT: 55.7 %
PDW BLD-RTO: 20 % (ref 11–16)
PLATELET # BLD: 167 K/UL (ref 150–400)
PMV BLD AUTO: 10 FL (ref 6–10)
POTASSIUM SERPL-SCNC: 5.2 MMOL/L (ref 3.4–5.1)
RBC # BLD: 3.65 M/UL (ref 3.8–5.8)
SODIUM BLD-SCNC: 140 MMOL/L (ref 136–145)
TOTAL PROTEIN: 6.7 G/DL (ref 6.4–8.3)
URIC ACID, SERUM: 5.5 MG/DL (ref 2.5–7.1)
WBC # BLD: 5.9 K/UL (ref 4–11)

## 2019-11-03 PROCEDURE — 80053 COMPREHEN METABOLIC PANEL: CPT

## 2019-11-03 PROCEDURE — 85025 COMPLETE CBC W/AUTO DIFF WBC: CPT

## 2019-11-03 PROCEDURE — 84550 ASSAY OF BLOOD/URIC ACID: CPT

## 2019-11-05 NOTE — PROGRESS NOTES
lymphadenopathy  Respiratory:  No respiratory distress, no wheezing, rales or rhonchi detected  Cardiovascular:  Normal rate, normal rhythm, no murmurs, no gallops, no rubs, trace edema BLE  GI:  Soft, nondistended, normal bowel sounds, nontender, no voluntary guarding  Musculoskeletal:  No cyanosis or obvious acute deformity. Moving all extremities   Integument:  Warm and dry. Some chronic skin changes noted to BLE. Neurologic:  Alert & oriented x 3, no apparent focal deficits noted   Psychiatric:  Speech and behavior appropriate     Lab Results   Component Value Date     11/03/2019    K 5.2 (H) 11/03/2019     11/03/2019    CO2 23 11/03/2019    BUN 45 (H) 11/03/2019    CREATININE 1.9 (H) 11/03/2019    GLUCOSE 242 (H) 11/03/2019    CALCIUM 9.4 11/03/2019    PROT 6.7 11/03/2019    LABALBU 3.8 11/03/2019    BILITOT <0.2 (L) 11/03/2019    ALKPHOS 115 (H) 11/03/2019    AST 14 11/03/2019    ALT 13 11/03/2019    LABGLOM 27 (L) 11/03/2019    GFRAA 32 (L) 11/03/2019    AGRATIO 1.3 11/03/2019    GLOB 2.9 11/03/2019           Lab Results   Component Value Date    WBC 5.9 11/03/2019    HGB 9.5 (L) 11/03/2019    HCT 30.0 (L) 11/03/2019    MCV 82.2 11/03/2019     11/03/2019       Lab Results   Component Value Date    TSH 4.06 06/05/2019       Lab Results   Component Value Date    LABA1C 8.4 (H) 04/29/2019       ASSESSMENT/PLAN:     1. Atrial fibrillation, unspecified type ([address]      2. Type 2 diabetes mellitus with complication (HCC)      3. Essential hypertension      4. Hyperkalemia      5. Morbid obesity ([address]      6. Anemia, unspecified type      7. Chronic kidney disease, unspecified CKD stage      Proceed with  in few days weeks months    Patient was seen and examined by Dr. Sharonda Vegas and plan of care reviewed. 1. Atrial fibrillation, unspecified type ([address]  NSR on exam. Recent cardioversion and medication adjustment for resistant a Fib. Follow up with cardiology on outpatient basis as scheduled.  On anticoagulation, amiodarone, and bisoprolol.     2. Declining functional status   PT/OT as indicated/recommended. Long standing debility. Encouraged increase activity. Try to improve other medical conditions as able. Long conversation with her regarding weight control.     3. Stage 3 chronic kidney disease ([facility]  With some recent worsening renal function. Followed by nephrology. Avoid nephrotoxic agents as able Monitor BP and make sure under good control.      4. Morbid obesity ([facility] [address]  Complicates all aspects of care. Weight loss discussed.      5. Essential hypertension  BP elevated at this time. Will monitor closely and adjust medication accordingly if indicated.      6. Type 2 diabetes mellitus with complication, unspecified whether long term insulin use (HCC)  Fingersticks fluctuating at times. Lifestyle modifications discussed. Cover with SSI per protocol if indicated. Check hemoglobin a1c every 3-6 months.          JOSE Dan     I was asked to close this encounter. I have deleted the *'s and encounter closed.

## 2019-11-20 ENCOUNTER — OFFICE VISIT (OUTPATIENT)
Dept: CARDIOLOGY | Facility: CLINIC | Age: 59
End: 2019-11-20

## 2019-11-20 ENCOUNTER — HOSPITAL ENCOUNTER (OUTPATIENT)
Facility: HOSPITAL | Age: 59
Discharge: HOME | End: 2019-11-20
Payer: MEDICARE

## 2019-11-20 VITALS
SYSTOLIC BLOOD PRESSURE: 132 MMHG | HEIGHT: 67 IN | DIASTOLIC BLOOD PRESSURE: 86 MMHG | BODY MASS INDEX: 37.67 KG/M2 | HEART RATE: 51 BPM | WEIGHT: 240 LBS | OXYGEN SATURATION: 98 %

## 2019-11-20 DIAGNOSIS — I10 ESSENTIAL HYPERTENSION: ICD-10-CM

## 2019-11-20 DIAGNOSIS — N18.30 CKD (CHRONIC KIDNEY DISEASE) STAGE 3, GFR 30-59 ML/MIN (HCC): ICD-10-CM

## 2019-11-20 DIAGNOSIS — I48.0 PAROXYSMAL ATRIAL FIBRILLATION (HCC): ICD-10-CM

## 2019-11-20 DIAGNOSIS — I49.5 TACHYCARDIA-BRADYCARDIA SYNDROME (HCC): ICD-10-CM

## 2019-11-20 DIAGNOSIS — I27.81 COR PULMONALE, CHRONIC (HCC): ICD-10-CM

## 2019-11-20 DIAGNOSIS — I87.303 CHRONIC VENOUS HYPERTENSION INVOLVING BOTH SIDES: ICD-10-CM

## 2019-11-20 DIAGNOSIS — I50.32 CHRONIC DIASTOLIC CONGESTIVE HEART FAILURE (HCC): Primary | ICD-10-CM

## 2019-11-20 DIAGNOSIS — E66.01 MORBID OBESITY WITH BMI OF 45.0-49.9, ADULT (HCC): ICD-10-CM

## 2019-11-20 PROCEDURE — 93005 ELECTROCARDIOGRAM TRACING: CPT

## 2019-11-20 PROCEDURE — 93000 ELECTROCARDIOGRAM COMPLETE: CPT | Performed by: INTERNAL MEDICINE

## 2019-11-20 PROCEDURE — 99214 OFFICE O/P EST MOD 30 MIN: CPT | Performed by: INTERNAL MEDICINE

## 2019-11-20 RX ORDER — AMIODARONE HYDROCHLORIDE 100 MG/1
100 TABLET ORAL
Qty: 15 TABLET | Refills: 11 | Status: SHIPPED | OUTPATIENT
Start: 2019-11-20 | End: 2022-04-14 | Stop reason: HOSPADM

## 2019-11-20 RX ORDER — NEBIVOLOL 2.5 MG/1
2.5 TABLET ORAL DAILY
Qty: 30 TABLET | Refills: 11 | Status: SHIPPED | OUTPATIENT
Start: 2019-11-20 | End: 2021-05-05

## 2019-11-20 NOTE — PROGRESS NOTES
Subjective:     Encounter Date:11/20/2019      Patient ID: Millicent Mckeon is a 59 y.o. female.    Chief Complaint: Slow heart rate  HPI  This is a 59-year-old female patient with paroxysmal atrial fibrillation who was referred to cardiology clinic for noted bradycardia.  At the nursing home the patient's heart rates have ranged from the mid 40s-mid 50s.  The patient is hemodynamically stable with no hypotension and is asymptomatic from a bradycardia standpoint.  The patient has no chest discomfort at rest or with activity.  There is no exertional chest arm neck jaw shoulder or back discomfort.  There is no orthopnea/ PND . There is no dizziness palpitations or syncope.  She continues to have improving lower extremity edema and is known to have a combination of features contributing to chronic lower extremity edema including diastolic heart failure, cor pulmonale and chronic venous stasis.  There is no evidence of active cellulitis.  The patient has had multiple episodes of recurrent cellulitis which has resulted in lymphatic scarring and chronic lymphedema as well.  She is not using any form of compression stockings.  She has had no clinical recurrences of atrial fibrillation.  The following portions of the patient's history were reviewed and updated as appropriate: allergies, current medications, past family history, past medical history, past social history, past surgical history and problem  Review of Systems   Constitution: Negative for chills, diaphoresis, fever, weakness, malaise/fatigue, weight gain and weight loss.   HENT: Negative for ear discharge, hearing loss, hoarse voice and nosebleeds.    Eyes: Negative for discharge, double vision, pain and photophobia.   Cardiovascular: Positive for leg swelling. Negative for chest pain, claudication, cyanosis, dyspnea on exertion, irregular heartbeat, near-syncope, orthopnea, palpitations, paroxysmal nocturnal dyspnea and syncope.   Respiratory: Negative for  cough, hemoptysis, shortness of breath, sputum production and wheezing.    Endocrine: Negative for cold intolerance, heat intolerance, polydipsia, polyphagia and polyuria.   Hematologic/Lymphatic: Negative for adenopathy and bleeding problem. Does not bruise/bleed easily.   Skin: Negative for color change, flushing, itching and rash.   Musculoskeletal: Negative for muscle cramps, muscle weakness, myalgias and stiffness.   Gastrointestinal: Negative for abdominal pain, diarrhea, hematemesis, hematochezia, nausea and vomiting.   Genitourinary: Negative for dysuria, frequency and nocturia.   Neurological: Negative for focal weakness, loss of balance, numbness, paresthesias and seizures.   Psychiatric/Behavioral: Negative for altered mental status, hallucinations and suicidal ideas.   Allergic/Immunologic: Negative for HIV exposure, hives and persistent infections.           Current Outpatient Medications:   •  acetaminophen (TYLENOL) 325 MG tablet, Take 650 mg by mouth Every 4 (Four) Hours As Needed for Mild Pain ., Disp: , Rfl:   •  ammonium lactate (AMLACTIN) 12 % cream, Apply 1 application topically to the appropriate area as directed 2 (Two) Times a Day., Disp: , Rfl:   •  arginine 500 MG tablet, Take 500 mg by mouth Daily., Disp: , Rfl:   •  aspirin 81 MG chewable tablet, Chew 81 mg Daily., Disp: , Rfl: 0  •  bumetanide (BUMEX) 1 MG tablet, Take 1 tablet by mouth Daily., Disp: , Rfl:   •  docusate sodium (COLACE) 100 MG capsule, Take 100 mg by mouth 2 (Two) Times a Day., Disp: , Rfl:   •  Elastic Bandages & Supports (JOBST OPAQUE KNEE 20-30MMHG XL) misc, 1 application Daily., Disp: 1 each, Rfl: 1  •  ferrous sulfate 324 (65 Fe) MG tablet delayed-release EC tablet, Take 324 mg by mouth 2 (Two) Times a Day With Meals., Disp: , Rfl:   •  insulin aspart (novoLOG) 100 UNIT/ML injection, Inject  under the skin into the appropriate area as directed 2 (Two) Times a Day. Sliding scale, low dose , Disp: , Rfl:   •   isosorbide mononitrate (ISMO,MONOKET) 10 MG tablet, Take 15 mg by mouth Daily., Disp: , Rfl:   •  levothyroxine (SYNTHROID, LEVOTHROID) 150 MCG tablet, Take 150 mcg by mouth Daily., Disp: , Rfl:   •  lidocaine (LIDODERM) 5 %, Place 1 patch on the skin as directed by provider Daily. Apply patch to left knee daily, on at 0700 am off at 2000, Disp: , Rfl:   •  Multiple Vitamins-Minerals (MULTIVITAMIN WITH MINERALS) tablet tablet, Take 1 tablet by mouth Daily., Disp: , Rfl:   •  nitroglycerin (NITROSTAT) 0.4 MG SL tablet, Place 0.4 mg under the tongue Every 5 (Five) Minutes As Needed for Chest Pain. Take no more than 3 doses in 15 minutes., Disp: , Rfl:   •  Nutritional Supplements (PROTEIN SUPPLEMENT 80% PO), Take 30 mL by mouth 2 (Two) Times a Day., Disp: , Rfl:   •  pantoprazole (PROTONIX) 40 MG EC tablet, Take 40 mg by mouth Daily., Disp: , Rfl:   •  polyethylene glycol (MIRALAX) packet, Take 17 g by mouth 2 (Two) Times a Day., Disp: , Rfl:   •  RA VITAMIN C 500 MG tablet, Take 500 mg by mouth Daily., Disp: , Rfl: 0  •  saccharomyces boulardii (FLORASTOR) 250 MG capsule, Take 250 mg by mouth 2 (Two) Times a Day., Disp: , Rfl:   •  simvastatin (ZOCOR) 20 MG tablet, Take 20 mg by mouth Every Night., Disp: , Rfl: 0  •  spironolactone (ALDACTONE) 25 MG tablet, Take 1 tablet by mouth 3 (Three) Times a Week. Monday, Wednesday and Friday., Disp: , Rfl:   •  Zinc Sulfate 220 (50 Zn) MG tablet, Take 1 tablet by mouth 2 (Two) Times a Day., Disp: , Rfl: 0  •  amiodarone (PACERONE) 100 MG tablet, Take 1 tablet by mouth Every Other Day., Disp: 15 tablet, Rfl: 11  •  apixaban (ELIQUIS) 5 MG tablet tablet, Take 1 tablet by mouth Every 12 (Twelve) Hours., Disp: 60 tablet, Rfl: 11  •  nebivolol (BYSTOLIC) 2.5 MG tablet, Take 1 tablet by mouth Daily., Disp: 30 tablet, Rfl: 11    Objective:   Physical Exam   Constitutional: She is oriented to person, place, and time. She appears well-developed and well-nourished. No distress.   HENT:  "  Head: Normocephalic and atraumatic.   Mouth/Throat: Oropharynx is clear and moist.   Eyes: Conjunctivae and EOM are normal. Pupils are equal, round, and reactive to light. No scleral icterus.   Neck: Normal range of motion. Neck supple. No JVD present. No tracheal deviation present. No thyromegaly present.   Cardiovascular: Normal rate, regular rhythm, S1 normal, S2 normal, normal heart sounds, intact distal pulses and normal pulses. PMI is not displaced. Exam reveals no gallop and no friction rub.   No murmur heard.  Pulmonary/Chest: Effort normal and breath sounds normal. No stridor. No respiratory distress. She has no wheezes. She has no rales.   Abdominal: Soft. Bowel sounds are normal. She exhibits no distension and no mass. There is no tenderness. There is no rebound and no guarding.   Musculoskeletal: Normal range of motion. She exhibits edema. She exhibits no deformity.   Chronic venous stasis dermatitis   Neurological: She is alert and oriented to person, place, and time. She displays normal reflexes. No cranial nerve deficit. Coordination normal.   Skin: Skin is warm and dry. No rash noted. She is not diaphoretic. No erythema.   Psychiatric: She has a normal mood and affect. Her behavior is normal. Thought content normal.     Blood pressure 132/86, pulse 51, height 170.2 cm (67.01\"), weight 109 kg (240 lb), SpO2 98 %.   Lab Review:     Assessment:       1. Chronic diastolic congestive heart failure (CMS/HCC)  Well compensated.  Heart failure with preserved ejection fraction.  Stage C.  Euvolemic.  New York Heart Association functional class I symptoms.    2. Paroxysmal atrial fibrillation (CMS/HCC)  Maintaining normal sinus rhythm on low-dose antiarrhythmic drug therapy.  Her current anticoagulation dosing is subtherapeutic as she does not meet criteria for decreased Eliquis dose as she has only 1 of the 3 necessary criteria for low-dose therapy.    3. Chronic venous hypertension involving both " sides  Unchanged chronic peripheral edema.    4. Cor pulmonale, chronic (CMS/HCC)  The patient is noted to have secondary pulmonary hypertension from a combination of diastolic heart failure and underlying COPD.  Fortunately she no longer smokes.    5. Essential hypertension  Acceptable blood pressure control.    6. Morbid obesity with BMI of 45.0-49.9, adult (CMS/HCC)  The patient has lost a significant amount of weight.  Her weight is down from a body mass index of almost 50-37.6.    7. CKD (chronic kidney disease) stage 3, GFR 30-59 ml/min (CMS/[facility])  Stable chronic renal insufficiency.    8. Tachycardia-bradycardia syndrome (CMS/[facility])  The patient's bradycardia is iatrogenic related to treatment measures for her paroxysmal atrial fibrillation.      ECG 12 Lead  Date/Time: 11/20/2019 3:57 PM  Performed by: Kevin Aceves MD  Authorized by: Kevin Aceves MD   Comparison: compared with previous ECG   Similar to previous ECG  Rhythm: sinus bradycardia  Rate: bradycardic  BPM: 50  Conduction: 1st degree AV block  QRS axis: normal  Other findings: prolonged QTc interval    Clinical impression: abnormal EKG            Plan:     I have recommended increasing her Eliquis to 5 mg orally twice per day.  I have recommended decreasing her Bystolic to 2.5 mg orally once per day.  I have recommended decreasing her amiodarone to 100 mg every other day.  The patient would benefit from compression stockings of her lower extremities utilizing Jobst stockings to the mid thigh with 20-25 mg of compression.  I would not recommend escalation of diuretic therapy as this will be counterproductive.  She would benefit from sodium restriction to 1500 mg/day.  Her leg should remain elevated greater than the level of her heart for most of the day.  The patient meets no requirement for permanent pacemaker implantation.

## 2019-12-05 ENCOUNTER — OFFICE VISIT (OUTPATIENT)
Dept: PRIMARY CARE CLINIC | Age: 59
End: 2019-12-05
Payer: MEDICARE

## 2019-12-05 DIAGNOSIS — I48.91 ATRIAL FIBRILLATION, UNSPECIFIED TYPE (HCC): Primary | ICD-10-CM

## 2019-12-05 DIAGNOSIS — E66.01 MORBID OBESITY (HCC): ICD-10-CM

## 2019-12-05 DIAGNOSIS — M17.0 OSTEOARTHRITIS OF BOTH KNEES, UNSPECIFIED OSTEOARTHRITIS TYPE: ICD-10-CM

## 2019-12-05 DIAGNOSIS — E11.8 TYPE 2 DIABETES MELLITUS WITH COMPLICATION (HCC): ICD-10-CM

## 2019-12-05 DIAGNOSIS — E03.9 HYPOTHYROIDISM, UNSPECIFIED TYPE: ICD-10-CM

## 2019-12-05 DIAGNOSIS — I10 ESSENTIAL HYPERTENSION: ICD-10-CM

## 2019-12-05 DIAGNOSIS — N18.30 STAGE 3 CHRONIC KIDNEY DISEASE (HCC): ICD-10-CM

## 2019-12-05 DIAGNOSIS — R53.81 DECLINING FUNCTIONAL STATUS: ICD-10-CM

## 2019-12-05 PROCEDURE — 3045F PR MOST RECENT HEMOGLOBIN A1C LEVEL 7.0-9.0%: CPT | Performed by: INTERNAL MEDICINE

## 2019-12-05 PROCEDURE — 99309 SBSQ NF CARE MODERATE MDM 30: CPT | Performed by: INTERNAL MEDICINE

## 2019-12-05 PROCEDURE — G8484 FLU IMMUNIZE NO ADMIN: HCPCS | Performed by: INTERNAL MEDICINE

## 2019-12-12 VITALS
SYSTOLIC BLOOD PRESSURE: 134 MMHG | TEMPERATURE: 97.1 F | HEART RATE: 119 BPM | DIASTOLIC BLOOD PRESSURE: 76 MMHG | RESPIRATION RATE: 16 BRPM | OXYGEN SATURATION: 95 %

## 2019-12-12 PROBLEM — M17.0 OSTEOARTHRITIS OF BOTH KNEES: Status: ACTIVE | Noted: 2019-12-12

## 2019-12-16 ENCOUNTER — HOSPITAL ENCOUNTER (OUTPATIENT)
Facility: HOSPITAL | Age: 59
Discharge: HOME | End: 2019-12-16
Payer: MEDICARE

## 2019-12-16 LAB
A/G RATIO: 1.4 (ref 0.8–2)
ALBUMIN SERPL-MCNC: 3.9 G/DL (ref 3.4–4.8)
ALP BLD-CCNC: 160 U/L (ref 25–100)
ALT SERPL-CCNC: 12 U/L (ref 4–36)
ANION GAP SERPL CALCULATED.3IONS-SCNC: 12 MMOL/L (ref 3–16)
AST SERPL-CCNC: 10 U/L (ref 8–33)
BASOPHILS ABSOLUTE: 0 K/UL (ref 0–0.1)
BASOPHILS RELATIVE PERCENT: 0.3 %
BILIRUB SERPL-MCNC: <0.2 MG/DL (ref 0.3–1.2)
BUN BLDV-MCNC: 53 MG/DL (ref 6–20)
CALCIUM SERPL-MCNC: 9.4 MG/DL (ref 8.5–10.5)
CHLORIDE BLD-SCNC: 100 MMOL/L (ref 98–107)
CO2: 24 MMOL/L (ref 20–30)
CREAT SERPL-MCNC: 2.2 MG/DL (ref 0.4–1.2)
EOSINOPHILS ABSOLUTE: 0.3 K/UL (ref 0–0.4)
EOSINOPHILS RELATIVE PERCENT: 5.2 %
GFR AFRICAN AMERICAN: 27
GFR NON-AFRICAN AMERICAN: 23
GLOBULIN: 2.8 G/DL
GLUCOSE BLD-MCNC: 139 MG/DL (ref 74–106)
HBA1C MFR BLD: 8.1 %
HCT VFR BLD CALC: 29.2 % (ref 37–47)
HEMOGLOBIN: 9.1 G/DL (ref 11.5–16.5)
IMMATURE GRANULOCYTES #: 0 K/UL
IMMATURE GRANULOCYTES %: 0.2 % (ref 0–5)
LYMPHOCYTES ABSOLUTE: 1.4 K/UL (ref 1.5–4)
LYMPHOCYTES RELATIVE PERCENT: 24.3 %
MCH RBC QN AUTO: 25.9 PG (ref 27–32)
MCHC RBC AUTO-ENTMCNC: 31.2 G/DL (ref 31–35)
MCV RBC AUTO: 83.2 FL (ref 80–100)
MONOCYTES ABSOLUTE: 0.6 K/UL (ref 0.2–0.8)
MONOCYTES RELATIVE PERCENT: 10.1 %
NEUTROPHILS ABSOLUTE: 3.6 K/UL (ref 2–7.5)
NEUTROPHILS RELATIVE PERCENT: 59.9 %
PDW BLD-RTO: 18.2 % (ref 11–16)
PLATELET # BLD: 172 K/UL (ref 150–400)
PMV BLD AUTO: 9.9 FL (ref 6–10)
POTASSIUM SERPL-SCNC: 5 MMOL/L (ref 3.4–5.1)
RBC # BLD: 3.51 M/UL (ref 3.8–5.8)
SODIUM BLD-SCNC: 136 MMOL/L (ref 136–145)
TOTAL PROTEIN: 6.7 G/DL (ref 6.4–8.3)
TSH SERPL DL<=0.05 MIU/L-ACNC: 0.53 UIU/ML (ref 0.35–5.5)
VITAMIN D 25-HYDROXY: 25.3 (ref 32–100)
WBC # BLD: 5.9 K/UL (ref 4–11)

## 2019-12-16 PROCEDURE — 82306 VITAMIN D 25 HYDROXY: CPT

## 2019-12-16 PROCEDURE — 85025 COMPLETE CBC W/AUTO DIFF WBC: CPT

## 2019-12-16 PROCEDURE — 83036 HEMOGLOBIN GLYCOSYLATED A1C: CPT

## 2019-12-16 PROCEDURE — 80053 COMPREHEN METABOLIC PANEL: CPT

## 2019-12-16 PROCEDURE — 84443 ASSAY THYROID STIM HORMONE: CPT

## 2020-01-13 ENCOUNTER — HOSPITAL ENCOUNTER (OUTPATIENT)
Facility: HOSPITAL | Age: 60
Discharge: HOME | End: 2020-01-13
Payer: MEDICARE

## 2020-01-13 LAB
BASOPHILS ABSOLUTE: 0 K/UL (ref 0–0.1)
BASOPHILS RELATIVE PERCENT: 0.6 %
EOSINOPHILS ABSOLUTE: 0.3 K/UL (ref 0–0.4)
EOSINOPHILS RELATIVE PERCENT: 4.5 %
HCT VFR BLD CALC: 32 % (ref 37–47)
HEMOGLOBIN: 9.6 G/DL (ref 11.5–16.5)
IMMATURE GRANULOCYTES #: 0 K/UL
IMMATURE GRANULOCYTES %: 0.2 % (ref 0–5)
LYMPHOCYTES ABSOLUTE: 1.6 K/UL (ref 1.5–4)
LYMPHOCYTES RELATIVE PERCENT: 25.3 %
MCH RBC QN AUTO: 25.9 PG (ref 27–32)
MCHC RBC AUTO-ENTMCNC: 30 G/DL (ref 31–35)
MCV RBC AUTO: 86.3 FL (ref 80–100)
MONOCYTES ABSOLUTE: 0.5 K/UL (ref 0.2–0.8)
MONOCYTES RELATIVE PERCENT: 7.5 %
NEUTROPHILS ABSOLUTE: 3.9 K/UL (ref 2–7.5)
NEUTROPHILS RELATIVE PERCENT: 61.9 %
PDW BLD-RTO: 16.9 % (ref 11–16)
PLATELET # BLD: 200 K/UL (ref 150–400)
PMV BLD AUTO: 10.4 FL (ref 6–10)
RBC # BLD: 3.71 M/UL (ref 3.8–5.8)
WBC # BLD: 6.3 K/UL (ref 4–11)

## 2020-01-13 PROCEDURE — 85025 COMPLETE CBC W/AUTO DIFF WBC: CPT

## 2020-01-16 RX ORDER — GLUCOSAMINE HCL/CHONDROITIN SU 500-400 MG
CAPSULE ORAL
Qty: 100 STRIP | Refills: 5 | Status: SHIPPED | OUTPATIENT
Start: 2020-01-16

## 2020-01-16 RX ORDER — LANCETS 30 GAUGE
1 EACH MISCELLANEOUS 2 TIMES DAILY
Qty: 100 EACH | Refills: 5 | Status: SHIPPED | OUTPATIENT
Start: 2020-01-16 | End: 2021-03-24

## 2020-01-16 RX ORDER — BLOOD-GLUCOSE METER
1 KIT MISCELLANEOUS DAILY PRN
Qty: 1 KIT | Refills: 0 | Status: SHIPPED | OUTPATIENT
Start: 2020-01-16 | End: 2021-09-28 | Stop reason: SDUPTHER

## 2020-01-29 ENCOUNTER — OFFICE VISIT (OUTPATIENT)
Dept: PRIMARY CARE CLINIC | Age: 60
End: 2020-01-29
Payer: MEDICARE

## 2020-01-29 VITALS
OXYGEN SATURATION: 97 % | DIASTOLIC BLOOD PRESSURE: 80 MMHG | SYSTOLIC BLOOD PRESSURE: 130 MMHG | HEART RATE: 106 BPM | BODY MASS INDEX: 42.61 KG/M2 | WEIGHT: 264 LBS

## 2020-01-29 PROCEDURE — 2022F DILAT RTA XM EVC RTNOPTHY: CPT | Performed by: NURSE PRACTITIONER

## 2020-01-29 PROCEDURE — G8417 CALC BMI ABV UP PARAM F/U: HCPCS | Performed by: NURSE PRACTITIONER

## 2020-01-29 PROCEDURE — G8484 FLU IMMUNIZE NO ADMIN: HCPCS | Performed by: NURSE PRACTITIONER

## 2020-01-29 PROCEDURE — G8427 DOCREV CUR MEDS BY ELIG CLIN: HCPCS | Performed by: NURSE PRACTITIONER

## 2020-01-29 PROCEDURE — 1036F TOBACCO NON-USER: CPT | Performed by: NURSE PRACTITIONER

## 2020-01-29 PROCEDURE — 3046F HEMOGLOBIN A1C LEVEL >9.0%: CPT | Performed by: NURSE PRACTITIONER

## 2020-01-29 PROCEDURE — 3017F COLORECTAL CA SCREEN DOC REV: CPT | Performed by: NURSE PRACTITIONER

## 2020-01-29 PROCEDURE — 99215 OFFICE O/P EST HI 40 MIN: CPT | Performed by: NURSE PRACTITIONER

## 2020-01-29 RX ORDER — AMIODARONE HYDROCHLORIDE 100 MG/1
TABLET ORAL
Qty: 30 TABLET | Refills: 5 | Status: SHIPPED | OUTPATIENT
Start: 2020-01-29 | End: 2020-10-16

## 2020-01-29 RX ORDER — AMLODIPINE BESYLATE 5 MG/1
5 TABLET ORAL DAILY
Qty: 30 TABLET | Refills: 5 | Status: ON HOLD | OUTPATIENT
Start: 2020-01-29 | End: 2020-04-27 | Stop reason: HOSPADM

## 2020-01-29 RX ORDER — NEBIVOLOL 2.5 MG/1
2.5 TABLET ORAL
COMMUNITY
Start: 2019-11-20 | End: 2020-01-29 | Stop reason: SDUPTHER

## 2020-01-29 RX ORDER — ZINC SULFATE 50(220)MG
220 CAPSULE ORAL 2 TIMES DAILY
Qty: 60 CAPSULE | Refills: 5 | COMMUNITY
Start: 2020-01-29

## 2020-01-29 RX ORDER — DOCUSATE SODIUM 100 MG/1
100 CAPSULE, LIQUID FILLED ORAL 2 TIMES DAILY
Qty: 60 CAPSULE | Refills: 5 | Status: CANCELLED | OUTPATIENT
Start: 2020-01-29

## 2020-01-29 RX ORDER — BUMETANIDE 1 MG/1
TABLET ORAL
Qty: 60 TABLET | Refills: 5 | Status: ON HOLD | OUTPATIENT
Start: 2020-01-29 | End: 2020-04-27 | Stop reason: HOSPADM

## 2020-01-29 RX ORDER — NEBIVOLOL 2.5 MG/1
2.5 TABLET ORAL DAILY
Qty: 30 TABLET | Refills: 5 | Status: ON HOLD | OUTPATIENT
Start: 2020-01-29 | End: 2020-04-27 | Stop reason: HOSPADM

## 2020-01-29 RX ORDER — INSULIN GLARGINE 100 [IU]/ML
INJECTION, SOLUTION SUBCUTANEOUS
Status: ON HOLD | COMMUNITY
Start: 2020-01-07 | End: 2020-04-27 | Stop reason: HOSPADM

## 2020-01-29 RX ORDER — MULTIVIT-MIN/IRON FUM/FOLIC AC 7.5 MG-4
1 TABLET ORAL DAILY
Qty: 30 TABLET | Refills: 5 | COMMUNITY
Start: 2020-01-29 | End: 2020-05-07 | Stop reason: SDUPTHER

## 2020-01-29 RX ORDER — LIDOCAINE 50 MG/G
1 PATCH TOPICAL EVERY 24 HOURS
Qty: 30 PATCH | Refills: 5 | Status: ON HOLD | OUTPATIENT
Start: 2020-01-29 | End: 2020-04-27 | Stop reason: HOSPADM

## 2020-01-29 RX ORDER — ISOSORBIDE MONONITRATE 10 MG/1
15 TABLET ORAL DAILY
Qty: 45 TABLET | Refills: 5 | Status: ON HOLD | OUTPATIENT
Start: 2020-01-29 | End: 2020-04-27 | Stop reason: HOSPADM

## 2020-01-29 RX ORDER — FERROUS SULFATE 325(65) MG
325 TABLET ORAL 2 TIMES DAILY WITH MEALS
Qty: 60 TABLET | Refills: 5 | Status: SHIPPED | OUTPATIENT
Start: 2020-01-29 | End: 2020-10-09

## 2020-01-29 RX ORDER — AMIODARONE HYDROCHLORIDE 100 MG/1
100 TABLET ORAL
COMMUNITY
Start: 2019-11-20 | End: 2020-01-29 | Stop reason: SDUPTHER

## 2020-01-29 RX ORDER — AMMONIUM LACTATE 12 G/100G
LOTION TOPICAL
Qty: 567 G | Refills: 5 | Status: SHIPPED | OUTPATIENT
Start: 2020-01-29 | End: 2020-12-04

## 2020-01-29 RX ORDER — LIDOCAINE 50 MG/G
1 PATCH TOPICAL DAILY
Qty: 30 PATCH | Refills: 5 | Status: SHIPPED | OUTPATIENT
Start: 2020-01-29

## 2020-01-29 RX ORDER — ASCORBIC ACID 500 MG
500 TABLET ORAL DAILY
Qty: 30 TABLET | Refills: 5 | Status: ON HOLD | OUTPATIENT
Start: 2020-01-29 | End: 2020-04-27 | Stop reason: HOSPADM

## 2020-01-29 RX ORDER — ARGININE 500 MG
500 TABLET ORAL DAILY
Qty: 30 TABLET | Refills: 5 | Status: SHIPPED | OUTPATIENT
Start: 2020-01-29 | End: 2020-09-16

## 2020-01-29 RX ORDER — ASPIRIN 81 MG/1
81 TABLET, CHEWABLE ORAL DAILY
Qty: 30 TABLET | Refills: 5 | Status: CANCELLED | OUTPATIENT
Start: 2020-01-29

## 2020-01-29 RX ORDER — SIMVASTATIN 20 MG
20 TABLET ORAL NIGHTLY
Qty: 30 TABLET | Refills: 5 | Status: SHIPPED | OUTPATIENT
Start: 2020-01-29 | End: 2020-06-23

## 2020-01-29 RX ORDER — LEVOTHYROXINE SODIUM 0.15 MG/1
150 TABLET ORAL DAILY
Qty: 30 TABLET | Refills: 5 | Status: ON HOLD | OUTPATIENT
Start: 2020-01-29 | End: 2020-04-27 | Stop reason: HOSPADM

## 2020-01-29 RX ORDER — PANTOPRAZOLE SODIUM 40 MG/1
40 TABLET, DELAYED RELEASE ORAL DAILY
Qty: 30 TABLET | Refills: 5 | Status: SHIPPED | OUTPATIENT
Start: 2020-01-29 | End: 2020-07-20

## 2020-01-29 RX ORDER — LIDOCAINE 50 MG/G
1 PATCH TOPICAL
COMMUNITY
End: 2020-01-29 | Stop reason: SDUPTHER

## 2020-01-29 RX ORDER — BUMETANIDE 1 MG/1
1 TABLET ORAL
COMMUNITY
Start: 2019-08-19 | End: 2020-01-29 | Stop reason: SDUPTHER

## 2020-01-29 RX ORDER — ISOSORBIDE MONONITRATE 10 MG/1
15 TABLET ORAL
COMMUNITY
End: 2020-01-29 | Stop reason: ALTCHOICE

## 2020-01-29 RX ORDER — ASPIRIN 81 MG/1
81 TABLET ORAL DAILY
Qty: 30 TABLET | Refills: 5 | Status: ON HOLD | OUTPATIENT
Start: 2020-01-29 | End: 2020-04-27 | Stop reason: HOSPADM

## 2020-01-29 ASSESSMENT — ENCOUNTER SYMPTOMS
ABDOMINAL PAIN: 0
SHORTNESS OF BREATH: 0
VOMITING: 0
NAUSEA: 0
SORE THROAT: 0
COUGH: 0
EYE PAIN: 0

## 2020-01-29 ASSESSMENT — PATIENT HEALTH QUESTIONNAIRE - PHQ9
SUM OF ALL RESPONSES TO PHQ9 QUESTIONS 1 & 2: 0
2. FEELING DOWN, DEPRESSED OR HOPELESS: 0
1. LITTLE INTEREST OR PLEASURE IN DOING THINGS: 0
SUM OF ALL RESPONSES TO PHQ QUESTIONS 1-9: 0
SUM OF ALL RESPONSES TO PHQ QUESTIONS 1-9: 0

## 2020-02-04 ENCOUNTER — TELEPHONE (OUTPATIENT)
Dept: PRIMARY CARE CLINIC | Age: 60
End: 2020-02-04

## 2020-02-07 NOTE — PROGRESS NOTES
SUBJECTIVE:    Patient ID: Collins Beck is a 62 y.o. female. Medical History Review  Past Medical, Family, and Social History reviewed and does contribute to the patient presenting condition    Health Maintenance Due   Topic Date Due    Hepatitis C screen  1960    DTaP/Tdap/Td vaccine (1 - Tdap) 07/14/1969    HIV screen  07/14/1973    Hepatitis B vaccine (1 of 3 - Risk 3-dose series) 07/14/1977    Diabetic foot exam  11/19/2015    Diabetic retinal exam  11/19/2015    Cervical cancer screen  11/19/2017    Shingles Vaccine (2 of 3) 12/01/2017    Pneumococcal 0-64 years Vaccine (2 of 3 - PPSV23) 12/01/2017    Breast cancer screen  03/24/2018    Annual Wellness Visit (AWV)  07/30/2019    Flu vaccine (1) 09/01/2019    Lipid screen  02/04/2020       HPI:   Chief Complaint   Patient presents with   [address] from Hospital     Patient here today for a hopsital follow up. She has been in the nursing home for months. She has been home for a couple of weeks and is doing well. Her legs are much better than they were. She uses LacHydrin lotion. She was diagnosed with A Fib and is on Amiodarone and a blood thinner. Her sugar has been good per her report. She uses a wheelchair in the home and when she goes out. Patient's medications, allergies, past medical, surgical, social and family histories were reviewed and updated as appropriate. Review of Systems Reviewed and acurate. See MA note. OBJECTIVE:  /80   Pulse 106   Wt 264 lb (119.7 kg)   SpO2 97%   BMI 42.61 kg/m²    Physical Exam  Vitals signs reviewed. Constitutional:       General: She is not in acute distress. Appearance: She is well-developed. HENT:      Head: Normocephalic. Right Ear: Tympanic membrane normal.      Left Ear: Tympanic membrane normal.      Mouth/Throat:      Pharynx: No oropharyngeal exudate. Eyes:      General: Lids are normal.   Neck:      Musculoskeletal: Neck supple.    Cardiovascular: Rate and Rhythm: Normal rate and regular rhythm. Heart sounds: Normal heart sounds. Pulmonary:      Effort: Pulmonary effort is normal.      Breath sounds: Normal breath sounds. Abdominal:      General: Bowel sounds are normal. There is no distension. Palpations: Abdomen is soft. Tenderness: There is no abdominal tenderness. Musculoskeletal:         General: Swelling present. Comments: Bilateral LE with 1-2+ pitting edema, minimal erythema, no open areas   Lymphadenopathy:      Cervical: No cervical adenopathy. Skin:     General: Skin is warm and dry. Neurological:      Mental Status: She is alert and oriented to person, place, and time. No results found for requested labs within last 30 days. Hemoglobin A1C (%)   Date Value   12/16/2019 8.1 (H)     Microscopic Examination (no units)   Date Value   08/13/2019 YES     LDL Calculated (mg/dL)   Date Value   02/04/2019 68       Lab Results   Component Value Date    WBC 6.3 01/13/2020    NEUTROABS 3.9 01/13/2020    HGB 9.6 (L) 01/13/2020    HCT 32.0 (L) 01/13/2020    MCV 86.3 01/13/2020     01/13/2020     Lab Results   Component Value Date    TSH 0.53 12/16/2019       Prior to Visit Medications    Medication Sig Taking? Authorizing Provider   amiodarone (PACERONE) 100 MG tablet Take 100mg by mouth every two days Yes JOSE Frazier   bumetanide (BUMEX) 1 MG tablet One tablet by mouth daily. Take 2  for weight pain of 3 pounds or greater Yes JOSE Frazier   nebivolol (BYSTOLIC) 2.5 MG tablet Take 1 tablet by mouth daily Yes JOSE Frazier   lidocaine (LIDODERM) 5 % Place 1 patch onto the skin daily Yes JOSE Frazier   levothyroxine (SYNTHROID) 150 MCG tablet Take 1 tablet by mouth Daily Take first thing in the morning on an empty stomach, wait 30min before taking other medication.  Yes JOSE Frazier   isosorbide mononitrate (ISMO;MONOKET) 10 MG tablet Take 1.5 tablets by mouth daily Yes 81 mg by mouth daily Yes Historical Provider, MD   Skin Protectants, Misc. (DIMETHICONE-ZINC OXIDE) cream Apply 1 Dose topically as needed for Dry Skin (for redness) Apply topically 2 times daily as needed. Yes Historical Provider, MD   lidocaine (LIDODERM) 5 % Place 1 patch onto the skin daily Apply to left knee every morning and remove every evening (12 hours on, 12 hours off). Yes Historical Provider, MD   Ferrous Sulfate (IRON) 325 (65 Fe) MG TABS take 1 tablet by mouth twice a day with food  Patient taking differently: 2 times daily  Yes JOSE Gilliam   docusate sodium (COLACE) 100 MG capsule Take 100 mg by mouth 2 times daily Yes Historical Provider, MD   saccharomyces boulardii (FLORASTOR) 250 MG capsule Take 250 mg by mouth 2 times daily Yes Historical Provider, MD   polyethylene glycol (MIRALAX) powder Take 17 g by mouth 2 times daily Yes Historical Provider, MD   arginine 500 MG tablet Take 500 mg by mouth daily Yes Historical Provider, MD   acetaminophen (TYLENOL) 325 MG tablet Take 650 mg by mouth every 6 hours as needed for Pain Yes Historical Provider, MD   Multiple Vitamins-Minerals (THERAPEUTIC MULTIVITAMIN-MINERALS) tablet Take 1 tablet by mouth daily Yes Historical Provider, MD   simvastatin (ZOCOR) 20 MG tablet take 1 tablet by mouth at bedtime Yes JOSE Gilliam   RA VITAMIN C 500 MG tablet TAKE 1 TABLET BY MOUTH DAILY Yes JOSE Gilliam   blood glucose test strips (FREESTYLE TEST STRIPS) strip Daily dx:250.00 Yes JOSE Gilliam   NOVOLOG 100 UNIT/ML injection vial USE SUBCUTANEOUS 3 TIMES A DAY BEFORE MEALS LOW DOSE SLIDING SCALE ENCLOSED  JOSE Gilliam   LANTUS 100 UNIT/ML injection vial   Historical Provider, MD       ASSESSMENT:  1. Stage 3 chronic kidney disease ([address]    2. Type 2 diabetes mellitus with complication (HCC)    3. Osteoarthritis of both knees, unspecified osteoarthritis type    4. Atrial fibrillation, unspecified type ([address]    5.  Essential hypertension    6. Venous stasis dermatitis of both lower extremities    7. Hypothyroidism, unspecified type    8. Gastroesophageal reflux disease, esophagitis presence not specified    9. Hyperlipidemia, unspecified hyperlipidemia type        PLAN:  Orders Placed This Encounter   Medications    amiodarone (PACERONE) 100 MG tablet     Sig: Take 100mg by mouth every two days     Dispense:  30 tablet     Refill:  5    bumetanide (BUMEX) 1 MG tablet     Sig: One tablet by mouth daily. Take 2  for weight pain of 3 pounds or greater     Dispense:  60 tablet     Refill:  5    nebivolol (BYSTOLIC) 2.5 MG tablet     Sig: Take 1 tablet by mouth daily     Dispense:  30 tablet     Refill:  5    lidocaine (LIDODERM) 5 %     Sig: Place 1 patch onto the skin daily     Dispense:  30 patch     Refill:  5    levothyroxine (SYNTHROID) 150 MCG tablet     Sig: Take 1 tablet by mouth Daily Take first thing in the morning on an empty stomach, wait 30min before taking other medication.      Dispense:  30 tablet     Refill:  5    isosorbide mononitrate (ISMO;MONOKET) 10 MG tablet     Sig: Take 1.5 tablets by mouth daily     Dispense:  45 tablet     Refill:  5    pantoprazole (PROTONIX) 40 MG tablet     Sig: Take 1 tablet by mouth daily     Dispense:  30 tablet     Refill:  5    zinc sulfate (ZINCATE) 220 (50 Zn) MG capsule     Sig: Take 1 capsule by mouth 2 times daily     Dispense:  60 capsule     Refill:  5    insulin glargine (BASAGLAR KWIKPEN) 100 UNIT/ML injection pen     Sig: Inject 12 Units into the skin nightly     Dispense:  5 pen     Refill:  5    Insulin Pen Needle 32G X 4 MM MISC     Si each by Does not apply route daily     Dispense:  50 each     Refill:  5    simvastatin (ZOCOR) 20 MG tablet     Sig: Take 1 tablet by mouth nightly     Dispense:  30 tablet     Refill:  5    vitamin C (ASCORBIC ACID) 500 MG tablet     Sig: Take 1 tablet by mouth daily     Dispense:  30 tablet     Refill:  5    ferrous sulfate 325 (65 Fe) MG tablet     Sig: Take 1 tablet by mouth 2 times daily (with meals)     Dispense:  60 tablet     Refill:  5    arginine 500 MG tablet     Sig: Take 1 tablet by mouth daily     Dispense:  30 tablet     Refill:  5    apixaban (ELIQUIS) 2.5 MG TABS tablet     Sig: Take 1 tablet by mouth daily     Dispense:  30 tablet     Refill:  5    amLODIPine (NORVASC) 5 MG tablet     Sig: Take 1 tablet by mouth daily     Dispense:  30 tablet     Refill:  5    lidocaine (LIDODERM) 5 %     Sig: Place 1 patch onto the skin every 24 hours 12 hours on, 12 hours off. Dispense:  30 patch     Refill:  5    Multiple Vitamins-Minerals (MULTIVITAMIN WITH MINERALS) tablet     Sig: Take 1 tablet by mouth daily     Dispense:  30 tablet     Refill:  5    ammonium lactate (LAC-HYDRIN) 12 % lotion     Sig: Apply topically daily. Dispense:  567 g     Refill:  5    aspirin EC 81 MG EC tablet     Sig: Take 1 tablet by mouth daily     Dispense:  30 tablet     Refill:  5     Greater than 45 minutes was spent updating her medications and history. Rx written for a new wheelchair. The wheels on the one she is using are messed up and it is difficult to roll. Return in about 4 weeks (around 2/26/2020).

## 2020-02-10 ENCOUNTER — HOSPITAL ENCOUNTER (OUTPATIENT)
Facility: HOSPITAL | Age: 60
Discharge: HOME | End: 2020-02-10
Payer: MEDICARE

## 2020-02-10 LAB
HCT VFR BLD CALC: 29.6 % (ref 37–47)
HEMOGLOBIN: 9.1 G/DL (ref 11.5–16.5)
MCH RBC QN AUTO: 26.3 PG (ref 27–32)
MCHC RBC AUTO-ENTMCNC: 30.7 G/DL (ref 31–35)
MCV RBC AUTO: 85.5 FL (ref 80–100)
PDW BLD-RTO: 16.1 % (ref 11–16)
PLATELET # BLD: 205 K/UL (ref 150–400)
PMV BLD AUTO: 10.6 FL (ref 6–10)
RBC # BLD: 3.46 M/UL (ref 3.8–5.8)
WBC # BLD: 6.9 K/UL (ref 4–11)

## 2020-02-10 PROCEDURE — 85027 COMPLETE CBC AUTOMATED: CPT

## 2020-02-25 ENCOUNTER — TELEPHONE (OUTPATIENT)
Dept: PRIMARY CARE CLINIC | Age: 60
End: 2020-02-25

## 2020-02-26 ENCOUNTER — TELEPHONE (OUTPATIENT)
Dept: PRIMARY CARE CLINIC | Age: 60
End: 2020-02-26

## 2020-02-26 NOTE — TELEPHONE ENCOUNTER
Pts family called stating that pts edema in legs is worse and seeping  (radha the lt) Home health was there and suggested they call and ask for an additional fluid pill

## 2020-03-04 ENCOUNTER — OFFICE VISIT (OUTPATIENT)
Dept: PRIMARY CARE CLINIC | Age: 60
End: 2020-03-04
Payer: MEDICARE

## 2020-03-04 VITALS — OXYGEN SATURATION: 98 % | SYSTOLIC BLOOD PRESSURE: 110 MMHG | HEART RATE: 96 BPM | DIASTOLIC BLOOD PRESSURE: 80 MMHG

## 2020-03-04 PROCEDURE — 3051F HG A1C>EQUAL 7.0%<8.0%: CPT | Performed by: NURSE PRACTITIONER

## 2020-03-04 PROCEDURE — 3017F COLORECTAL CA SCREEN DOC REV: CPT | Performed by: NURSE PRACTITIONER

## 2020-03-04 PROCEDURE — 2022F DILAT RTA XM EVC RTNOPTHY: CPT | Performed by: NURSE PRACTITIONER

## 2020-03-04 PROCEDURE — G8427 DOCREV CUR MEDS BY ELIG CLIN: HCPCS | Performed by: NURSE PRACTITIONER

## 2020-03-04 PROCEDURE — 99214 OFFICE O/P EST MOD 30 MIN: CPT | Performed by: NURSE PRACTITIONER

## 2020-03-04 PROCEDURE — G8417 CALC BMI ABV UP PARAM F/U: HCPCS | Performed by: NURSE PRACTITIONER

## 2020-03-04 PROCEDURE — G8484 FLU IMMUNIZE NO ADMIN: HCPCS | Performed by: NURSE PRACTITIONER

## 2020-03-04 PROCEDURE — 1036F TOBACCO NON-USER: CPT | Performed by: NURSE PRACTITIONER

## 2020-03-04 RX ORDER — MECLIZINE HYDROCHLORIDE 25 MG/1
TABLET ORAL
Qty: 30 TABLET | Refills: 5 | Status: SHIPPED | OUTPATIENT
Start: 2020-03-04 | End: 2021-02-22

## 2020-03-04 RX ORDER — LOPERAMIDE HYDROCHLORIDE 2 MG/1
2 CAPSULE ORAL 4 TIMES DAILY PRN
Qty: 30 CAPSULE | Refills: 5 | Status: ON HOLD | OUTPATIENT
Start: 2020-03-04 | End: 2020-04-27 | Stop reason: HOSPADM

## 2020-03-04 ASSESSMENT — ENCOUNTER SYMPTOMS
COUGH: 0
VOMITING: 0
NAUSEA: 0
ABDOMINAL PAIN: 0
EYE PAIN: 0
SHORTNESS OF BREATH: 0
SORE THROAT: 0

## 2020-03-05 LAB — HBA1C MFR BLD: 7.9 %

## 2020-03-13 ENCOUNTER — HOSPITAL ENCOUNTER (OUTPATIENT)
Facility: HOSPITAL | Age: 60
Discharge: HOME | End: 2020-03-13
Payer: MEDICARE

## 2020-03-13 LAB
HCT VFR BLD CALC: 30.1 % (ref 37–47)
HEMOGLOBIN: 8.9 G/DL (ref 11.5–16.5)
MCH RBC QN AUTO: 25.5 PG (ref 27–32)
MCHC RBC AUTO-ENTMCNC: 29.6 G/DL (ref 31–35)
MCV RBC AUTO: 86.2 FL (ref 80–100)
PDW BLD-RTO: 18.2 % (ref 11–16)
PLATELET # BLD: 171 K/UL (ref 150–400)
PMV BLD AUTO: 11.2 FL (ref 6–10)
RBC # BLD: 3.49 M/UL (ref 3.8–5.8)
WBC # BLD: 4.8 K/UL (ref 4–11)

## 2020-03-13 PROCEDURE — 85027 COMPLETE CBC AUTOMATED: CPT

## 2020-03-15 NOTE — PROGRESS NOTES
SUBJECTIVE:    Patient ID: Jordy Henson is a 62 y.o. female. Medical History Review  Past Medical, Family, and Social History reviewed and does contribute to the patient presenting condition    Health Maintenance Due   Topic Date Due    Hepatitis C screen  1960    HIV screen  07/14/1973    Hepatitis B vaccine (1 of 3 - Risk 3-dose series) 07/14/1977    DTaP/Tdap/Td vaccine (1 - Tdap) 07/14/1977    Diabetic foot exam  11/19/2015    Diabetic retinal exam  11/19/2015    Cervical cancer screen  11/19/2017    Shingles Vaccine (2 of 3) 12/01/2017    Pneumococcal 0-64 years Vaccine (2 of 3 - PPSV23) 12/01/2017    Breast cancer screen  03/24/2018    Annual Wellness Visit (AWV)  07/30/2019    Flu vaccine (1) 09/01/2019    Lipid screen  02/04/2020       HPI:   Chief Complaint   Patient presents with    Cellulitis     Patient here today for a follow up with cellultitis. She states she has one spot on her leg she has wrapped up. She is needing some anti nausea, and anti diarrhea medication. Home health has applied UNNA boots which have helped. Patient's medications, allergies, past medical, surgical, social and family histories were reviewed and updated as appropriate. Review of Systems Reviewed and acurate. See MA note. OBJECTIVE:  /80   Pulse 96   SpO2 98%    Physical Exam  Vitals signs reviewed. Constitutional:       General: She is not in acute distress. Appearance: She is well-developed. HENT:      Head: Normocephalic. Right Ear: Tympanic membrane normal.      Left Ear: Tympanic membrane normal.      Mouth/Throat:      Pharynx: No oropharyngeal exudate. Eyes:      General: Lids are normal.   Neck:      Musculoskeletal: Neck supple. Cardiovascular:      Rate and Rhythm: Normal rate and regular rhythm. Heart sounds: Normal heart sounds. Pulmonary:      Effort: Pulmonary effort is normal.      Breath sounds: Normal breath sounds.    Abdominal:      General: Bowel sounds are normal. There is no distension. Palpations: Abdomen is soft. Tenderness: There is no abdominal tenderness. Musculoskeletal:         General: Swelling present. Comments: ACE wraps on the lower legs, 1-2+ pitting edema in the BLE   Lymphadenopathy:      Cervical: No cervical adenopathy. Skin:     General: Skin is warm and dry. Neurological:      Mental Status: She is alert and oriented to person, place, and time. No results found for requested labs within last 30 days. Hemoglobin A1C (%)   Date Value   03/04/2020 7.9     Microscopic Examination (no units)   Date Value   08/13/2019 YES     LDL Calculated (mg/dL)   Date Value   02/04/2019 68       Lab Results   Component Value Date    WBC 4.8 03/13/2020    NEUTROABS 3.9 01/13/2020    HGB 8.9 (L) 03/13/2020    HCT 30.1 (L) 03/13/2020    MCV 86.2 03/13/2020     03/13/2020     Lab Results   Component Value Date    TSH 0.53 12/16/2019       Prior to Visit Medications    Medication Sig Taking? Authorizing Provider   Insulin Syringe-Needle U-100 30G X 5/16\" 1 ML MISC 1 each by Does not apply route daily Yes JOSE Cortez   loperamide (IMODIUM) 2 MG capsule Take 1 capsule by mouth 4 times daily as needed for Diarrhea Yes JOSE Cortez   meclizine (ANTIVERT) 25 MG tablet take 1 tablet by mouth three times a day if needed for dizziness OR MOTION SICKNESS Yes JOSE Cortez   NOVOLOG 100 UNIT/ML injection vial USE SUBCUTANEOUS 3 TIMES A DAY BEFORE MEALS LOW DOSE SLIDING SCALE ENCLOSED Yes JOSE Cortez   LANTUS 100 UNIT/ML injection vial  Yes Historical Provider, MD   amiodarone (PACERONE) 100 MG tablet Take 100mg by mouth every two days Yes JOSE Cortez   bumetanide (BUMEX) 1 MG tablet One tablet by mouth daily.  Take 2  for weight pain of 3 pounds or greater Yes JOSE Cortez   nebivolol (BYSTOLIC) 2.5 MG tablet Take 1 tablet by mouth daily Yes OJSE Cortez   lidocaine (LIDODERM) 5 % Place 1 patch onto the skin daily Yes JOSE Simms   levothyroxine (SYNTHROID) 150 MCG tablet Take 1 tablet by mouth Daily Take first thing in the morning on an empty stomach, wait 30min before taking other medication. Yes JOSE Simms   isosorbide mononitrate (ISMO;MONOKET) 10 MG tablet Take 1.5 tablets by mouth daily Yes JOSE Simms   pantoprazole (PROTONIX) 40 MG tablet Take 1 tablet by mouth daily Yes JOSE Simms   zinc sulfate (ZINCATE) 220 (50 Zn) MG capsule Take 1 capsule by mouth 2 times daily Yes JOSE Simms   insulin glargine (BASAGLAR KWIKPEN) 100 UNIT/ML injection pen Inject 12 Units into the skin nightly Yes JOSE Simms   Insulin Pen Needle 32G X 4 MM MISC 1 each by Does not apply route daily Yes JOSE Simms   simvastatin (ZOCOR) 20 MG tablet Take 1 tablet by mouth nightly Yes JOSE Simms   vitamin C (ASCORBIC ACID) 500 MG tablet Take 1 tablet by mouth daily Yes JOSE Simms   ferrous sulfate 325 (65 Fe) MG tablet Take 1 tablet by mouth 2 times daily (with meals) Yes JOSE Simms   arginine 500 MG tablet Take 1 tablet by mouth daily Yes JOSE Simms   apixaban (ELIQUIS) 2.5 MG TABS tablet Take 1 tablet by mouth daily Yes JOSE Simms   amLODIPine (NORVASC) 5 MG tablet Take 1 tablet by mouth daily Yes JOSE Simms   lidocaine (LIDODERM) 5 % Place 1 patch onto the skin every 24 hours 12 hours on, 12 hours off. Yes JOSE Simms   Multiple Vitamins-Minerals (MULTIVITAMIN WITH MINERALS) tablet Take 1 tablet by mouth daily Yes JOSE Simms   ammonium lactate (LAC-HYDRIN) 12 % lotion Apply topically daily. Yes JOSE Simms   aspirin EC 81 MG EC tablet Take 1 tablet by mouth daily Yes JOSE Simms   blood glucose monitor strips Test daily & as needed for symptoms of irregular blood glucose.  Dx E11.9 Freestyle Lite Yes JOSE Simms   glucose

## 2020-03-27 ENCOUNTER — TELEPHONE (OUTPATIENT)
Dept: PRIMARY CARE CLINIC | Age: 60
End: 2020-03-27

## 2020-03-27 RX ORDER — ALBUTEROL SULFATE 90 UG/1
2 AEROSOL, METERED RESPIRATORY (INHALATION) EVERY 4 HOURS PRN
Qty: 1 INHALER | Refills: 2 | Status: SHIPPED | OUTPATIENT
Start: 2020-03-27 | End: 2020-06-15

## 2020-03-27 RX ORDER — ALBUTEROL SULFATE 90 UG/1
2 AEROSOL, METERED RESPIRATORY (INHALATION) EVERY 4 HOURS PRN
Qty: 42.5 G | OUTPATIENT
Start: 2020-03-27

## 2020-04-07 ENCOUNTER — TELEPHONE (OUTPATIENT)
Dept: PRIMARY CARE CLINIC | Age: 60
End: 2020-04-07

## 2020-04-08 ENCOUNTER — HOSPITAL ENCOUNTER (EMERGENCY)
Facility: HOSPITAL | Age: 60
Discharge: HOME | End: 2020-04-08
Attending: EMERGENCY MEDICINE | Admitting: EMERGENCY MEDICINE

## 2020-04-08 ENCOUNTER — APPOINTMENT (OUTPATIENT)
Dept: GENERAL RADIOLOGY | Facility: HOSPITAL | Age: 60
End: 2020-04-08

## 2020-04-08 VITALS
TEMPERATURE: 97.6 F | HEART RATE: 99 BPM | HEIGHT: 66 IN | DIASTOLIC BLOOD PRESSURE: 92 MMHG | BODY MASS INDEX: 40.18 KG/M2 | RESPIRATION RATE: 18 BRPM | OXYGEN SATURATION: 98 % | SYSTOLIC BLOOD PRESSURE: 144 MMHG | WEIGHT: 250 LBS

## 2020-04-08 DIAGNOSIS — I89.0 CHRONIC ACQUIRED LYMPHEDEMA: ICD-10-CM

## 2020-04-08 DIAGNOSIS — I50.32 CHRONIC DIASTOLIC (CONGESTIVE) HEART FAILURE (HCC): Primary | ICD-10-CM

## 2020-04-08 DIAGNOSIS — E66.01 MORBID OBESITY (HCC): ICD-10-CM

## 2020-04-08 LAB
A-A DO2: 10.5 MMHG
ALBUMIN SERPL-MCNC: 3.8 G/DL (ref 3.5–5.2)
ALBUMIN/GLOB SERPL: 1.2 G/DL
ALP SERPL-CCNC: 176 U/L (ref 39–117)
ALT SERPL W P-5'-P-CCNC: 9 U/L (ref 1–33)
ANION GAP SERPL CALCULATED.3IONS-SCNC: 13.7 MMOL/L (ref 5–15)
ANISOCYTOSIS BLD QL: NORMAL
ARTERIAL PATENCY WRIST A: ABNORMAL
AST SERPL-CCNC: 14 U/L (ref 1–32)
ATMOSPHERIC PRESS: 729 MMHG
BASE EXCESS BLDA CALC-SCNC: -3.6 MMOL/L (ref 0–2)
BASOPHILS # BLD AUTO: 0.02 10*3/MM3 (ref 0–0.2)
BASOPHILS NFR BLD AUTO: 0.4 % (ref 0–1.5)
BDY SITE: ABNORMAL
BILIRUB SERPL-MCNC: 0.4 MG/DL (ref 0.2–1.2)
BUN BLD-MCNC: 44 MG/DL (ref 8–23)
BUN/CREAT SERPL: 16.5 (ref 7–25)
CALCIUM SPEC-SCNC: 8.4 MG/DL (ref 8.6–10.5)
CHLORIDE SERPL-SCNC: 108 MMOL/L (ref 98–107)
CO2 SERPL-SCNC: 21.3 MMOL/L (ref 22–29)
COHGB MFR BLD: <0.6 % (ref 0–2)
CREAT BLD-MCNC: 2.66 MG/DL (ref 0.57–1)
D-LACTATE SERPL-SCNC: 1.1 MMOL/L (ref 0.5–2)
DEPRECATED RDW RBC AUTO: 57.8 FL (ref 37–54)
EOSINOPHIL # BLD AUTO: 0.26 10*3/MM3 (ref 0–0.4)
EOSINOPHIL NFR BLD AUTO: 5.2 % (ref 0.3–6.2)
ERYTHROCYTE [DISTWIDTH] IN BLOOD BY AUTOMATED COUNT: 19.2 % (ref 12.3–15.4)
FLUAV AG NPH QL: NEGATIVE
FLUBV AG NPH QL IA: NEGATIVE
GFR SERPL CREATININE-BSD FRML MDRD: 18 ML/MIN/1.73
GFR SERPL CREATININE-BSD FRML MDRD: 22 ML/MIN/1.73
GLOBULIN UR ELPH-MCNC: 3.1 GM/DL
GLUCOSE BLD-MCNC: 140 MG/DL (ref 65–99)
HCO3 BLDA-SCNC: 22.3 MMOL/L (ref 22–28)
HCT VFR BLD AUTO: 31.3 % (ref 34–46.6)
HCT VFR BLD CALC: 29.6 %
HGB BLD-MCNC: 9.2 G/DL (ref 12–15.9)
HGB BLDA-MCNC: 9.6 G/DL (ref 12–18)
HOLD SPECIMEN: NORMAL
HOLD SPECIMEN: NORMAL
HOROWITZ INDEX BLD+IHG-RTO: 21 %
HYPOCHROMIA BLD QL: NORMAL
IMM GRANULOCYTES # BLD AUTO: 0.01 10*3/MM3 (ref 0–0.05)
IMM GRANULOCYTES NFR BLD AUTO: 0.2 % (ref 0–0.5)
LYMPHOCYTES # BLD AUTO: 0.63 10*3/MM3 (ref 0.7–3.1)
LYMPHOCYTES NFR BLD AUTO: 12.7 % (ref 19.6–45.3)
MCH RBC QN AUTO: 24.6 PG (ref 26.6–33)
MCHC RBC AUTO-ENTMCNC: 29.4 G/DL (ref 31.5–35.7)
MCV RBC AUTO: 83.7 FL (ref 79–97)
METHGB BLD QL: 0.9 % (ref 0–1.5)
MODALITY: ABNORMAL
MONOCYTES # BLD AUTO: 0.64 10*3/MM3 (ref 0.1–0.9)
MONOCYTES NFR BLD AUTO: 12.9 % (ref 5–12)
NEUTROPHILS # BLD AUTO: 3.41 10*3/MM3 (ref 1.7–7)
NEUTROPHILS NFR BLD AUTO: 68.6 % (ref 42.7–76)
NOTE: ABNORMAL
NRBC BLD AUTO-RTO: 0 /100 WBC (ref 0–0.2)
NT-PROBNP SERPL-MCNC: ABNORMAL PG/ML (ref 5–900)
OXYHGB MFR BLDV: 93.5 % (ref 94–99)
PCO2 BLDA: 43.2 MM HG (ref 35–45)
PCO2 TEMP ADJ BLD: ABNORMAL MM[HG]
PH BLDA: 7.32 PH UNITS (ref 7.3–7.5)
PH, TEMP CORRECTED: ABNORMAL
PLATELET # BLD AUTO: 127 10*3/MM3 (ref 140–450)
PMV BLD AUTO: 10.7 FL (ref 6–12)
PO2 BLDA: 83.9 MM HG (ref 75–100)
PO2 TEMP ADJ BLD: ABNORMAL MM[HG]
POLYCHROMASIA BLD QL SMEAR: NORMAL
POTASSIUM BLD-SCNC: 5.1 MMOL/L (ref 3.5–5.2)
PROT SERPL-MCNC: 6.9 G/DL (ref 6–8.5)
RBC # BLD AUTO: 3.74 10*6/MM3 (ref 3.77–5.28)
SAO2 % BLDCOA: 94.7 % (ref 94–100)
SMALL PLATELETS BLD QL SMEAR: ADEQUATE
SODIUM BLD-SCNC: 143 MMOL/L (ref 136–145)
TROPONIN T SERPL-MCNC: 0.01 NG/ML (ref 0–0.03)
VENTILATOR MODE: ABNORMAL
WBC MORPH BLD: NORMAL
WBC NRBC COR # BLD: 4.97 10*3/MM3 (ref 3.4–10.8)
WHOLE BLOOD HOLD SPECIMEN: NORMAL
WHOLE BLOOD HOLD SPECIMEN: NORMAL

## 2020-04-08 PROCEDURE — 83605 ASSAY OF LACTIC ACID: CPT | Performed by: EMERGENCY MEDICINE

## 2020-04-08 PROCEDURE — 85025 COMPLETE CBC W/AUTO DIFF WBC: CPT | Performed by: EMERGENCY MEDICINE

## 2020-04-08 PROCEDURE — 82375 ASSAY CARBOXYHB QUANT: CPT

## 2020-04-08 PROCEDURE — 87804 INFLUENZA ASSAY W/OPTIC: CPT | Performed by: PHYSICIAN ASSISTANT

## 2020-04-08 PROCEDURE — 85007 BL SMEAR W/DIFF WBC COUNT: CPT | Performed by: EMERGENCY MEDICINE

## 2020-04-08 PROCEDURE — 99284 EMERGENCY DEPT VISIT MOD MDM: CPT

## 2020-04-08 PROCEDURE — 80053 COMPREHEN METABOLIC PANEL: CPT | Performed by: EMERGENCY MEDICINE

## 2020-04-08 PROCEDURE — 83050 HGB METHEMOGLOBIN QUAN: CPT

## 2020-04-08 PROCEDURE — 87635 SARS-COV-2 COVID-19 AMP PRB: CPT | Performed by: PHYSICIAN ASSISTANT

## 2020-04-08 PROCEDURE — 96374 THER/PROPH/DIAG INJ IV PUSH: CPT

## 2020-04-08 PROCEDURE — 36600 WITHDRAWAL OF ARTERIAL BLOOD: CPT

## 2020-04-08 PROCEDURE — 25010000002 FUROSEMIDE PER 20 MG: Performed by: PHYSICIAN ASSISTANT

## 2020-04-08 PROCEDURE — 93005 ELECTROCARDIOGRAM TRACING: CPT | Performed by: EMERGENCY MEDICINE

## 2020-04-08 PROCEDURE — 71045 X-RAY EXAM CHEST 1 VIEW: CPT

## 2020-04-08 PROCEDURE — 87040 BLOOD CULTURE FOR BACTERIA: CPT | Performed by: EMERGENCY MEDICINE

## 2020-04-08 PROCEDURE — 83880 ASSAY OF NATRIURETIC PEPTIDE: CPT | Performed by: PHYSICIAN ASSISTANT

## 2020-04-08 PROCEDURE — 84484 ASSAY OF TROPONIN QUANT: CPT | Performed by: PHYSICIAN ASSISTANT

## 2020-04-08 PROCEDURE — 82805 BLOOD GASES W/O2 SATURATION: CPT

## 2020-04-08 RX ORDER — FUROSEMIDE 10 MG/ML
20 INJECTION INTRAMUSCULAR; INTRAVENOUS ONCE
Status: COMPLETED | OUTPATIENT
Start: 2020-04-08 | End: 2020-04-08

## 2020-04-08 RX ORDER — SODIUM CHLORIDE 0.9 % (FLUSH) 0.9 %
10 SYRINGE (ML) INJECTION AS NEEDED
Status: DISCONTINUED | OUTPATIENT
Start: 2020-04-08 | End: 2020-04-08 | Stop reason: HOSPADM

## 2020-04-08 RX ORDER — ALBUTEROL SULFATE 90 UG/1
2 AEROSOL, METERED RESPIRATORY (INHALATION) ONCE
Status: COMPLETED | OUTPATIENT
Start: 2020-04-08 | End: 2020-04-08

## 2020-04-08 RX ADMIN — ALBUTEROL SULFATE 2 PUFF: 90 AEROSOL, METERED RESPIRATORY (INHALATION) at 11:19

## 2020-04-08 RX ADMIN — FUROSEMIDE 20 MG: 10 INJECTION, SOLUTION INTRAMUSCULAR; INTRAVENOUS at 11:19

## 2020-04-09 ENCOUNTER — TELEPHONE (OUTPATIENT)
Dept: PRIMARY CARE CLINIC | Age: 60
End: 2020-04-09

## 2020-04-09 LAB — SARS-COV-2 RNA RESP QL NAA+PROBE: NOT DETECTED

## 2020-04-09 RX ORDER — FUROSEMIDE 20 MG/1
20 TABLET ORAL DAILY PRN
Qty: 5 TABLET | Refills: 0 | Status: ON HOLD | OUTPATIENT
Start: 2020-04-09 | End: 2020-04-27 | Stop reason: HOSPADM

## 2020-04-09 RX ORDER — FUROSEMIDE 20 MG/1
TABLET ORAL
Qty: 90 TABLET | OUTPATIENT
Start: 2020-04-09

## 2020-04-10 ENCOUNTER — TELEPHONE (OUTPATIENT)
Dept: PREADMISSION TESTING | Facility: HOSPITAL | Age: 60
End: 2020-04-10

## 2020-04-11 ENCOUNTER — HOSPITAL ENCOUNTER (INPATIENT)
Facility: HOSPITAL | Age: 60
LOS: 3 days | Discharge: NURSING, REHAB OR LONG-TERM CARE | DRG: 291 | End: 2020-04-14
Attending: FAMILY MEDICINE | Admitting: INTERNAL MEDICINE
Payer: MEDICARE

## 2020-04-11 ENCOUNTER — APPOINTMENT (OUTPATIENT)
Dept: GENERAL RADIOLOGY | Facility: HOSPITAL | Age: 60
DRG: 291 | End: 2020-04-11
Payer: MEDICARE

## 2020-04-11 PROBLEM — N30.01 ACUTE CYSTITIS WITH HEMATURIA: Status: ACTIVE | Noted: 2019-06-03

## 2020-04-11 PROBLEM — I50.33 ACUTE ON CHRONIC DIASTOLIC HEART FAILURE (HCC): Status: ACTIVE | Noted: 2020-04-11

## 2020-04-11 PROBLEM — J18.9 PNEUMONIA: Status: ACTIVE | Noted: 2020-04-11

## 2020-04-11 PROBLEM — R60.0 LOWER EXTREMITY EDEMA: Status: ACTIVE | Noted: 2020-04-11

## 2020-04-11 LAB
A/G RATIO: 1.4 (ref 0.8–2)
ALBUMIN SERPL-MCNC: 4.2 G/DL (ref 3.4–4.8)
ALP BLD-CCNC: 191 U/L (ref 25–100)
ALT SERPL-CCNC: 9 U/L (ref 4–36)
ANION GAP SERPL CALCULATED.3IONS-SCNC: 17 MMOL/L (ref 3–16)
AST SERPL-CCNC: 12 U/L (ref 8–33)
BACTERIA: ABNORMAL /HPF
BASE EXCESS ARTERIAL: -2.9 MMOL/L (ref -3–3)
BASOPHILS ABSOLUTE: 0.1 K/UL (ref 0–0.1)
BASOPHILS RELATIVE PERCENT: 0.6 %
BILIRUB SERPL-MCNC: 0.5 MG/DL (ref 0.3–1.2)
BILIRUBIN URINE: NEGATIVE
BLOOD, URINE: ABNORMAL
BUN BLDV-MCNC: 46 MG/DL (ref 6–20)
CALCIUM SERPL-MCNC: 8.9 MG/DL (ref 8.5–10.5)
CHLORIDE BLD-SCNC: 104 MMOL/L (ref 98–107)
CLARITY: ABNORMAL
CO2: 21 MMOL/L (ref 20–30)
COLOR: YELLOW
CREAT SERPL-MCNC: 2.9 MG/DL (ref 0.4–1.2)
EOSINOPHILS ABSOLUTE: 0.2 K/UL (ref 0–0.4)
EOSINOPHILS RELATIVE PERCENT: 2 %
EPITHELIAL CELLS, UA: ABNORMAL /HPF (ref 0–5)
FIO2: 0.3 %
GFR AFRICAN AMERICAN: 20
GFR NON-AFRICAN AMERICAN: 16
GLOBULIN: 3.1 G/DL
GLUCOSE BLD-MCNC: 131 MG/DL (ref 74–106)
GLUCOSE BLD-MCNC: 159 MG/DL (ref 74–106)
GLUCOSE BLD-MCNC: 176 MG/DL (ref 74–106)
GLUCOSE BLD-MCNC: 186 MG/DL (ref 74–106)
GLUCOSE URINE: NEGATIVE MG/DL
HCO3 ARTERIAL: 22.4 MMOL/L (ref 22–26)
HCT VFR BLD CALC: 34.6 % (ref 37–47)
HEMOGLOBIN: 9.9 G/DL (ref 11.5–16.5)
IMMATURE GRANULOCYTES #: 0 K/UL
IMMATURE GRANULOCYTES %: 0.4 % (ref 0–5)
KETONES, URINE: NEGATIVE MG/DL
LEUKOCYTE ESTERASE, URINE: ABNORMAL
LYMPHOCYTES ABSOLUTE: 0.7 K/UL (ref 1.5–4)
LYMPHOCYTES RELATIVE PERCENT: 6.2 %
MCH RBC QN AUTO: 24.6 PG (ref 27–32)
MCHC RBC AUTO-ENTMCNC: 28.6 G/DL (ref 31–35)
MCV RBC AUTO: 85.9 FL (ref 80–100)
MICROSCOPIC EXAMINATION: YES
MONOCYTES ABSOLUTE: 0.7 K/UL (ref 0.2–0.8)
MONOCYTES RELATIVE PERCENT: 6.2 %
NEUTROPHILS ABSOLUTE: 9.2 K/UL (ref 2–7.5)
NEUTROPHILS RELATIVE PERCENT: 84.6 %
NITRITE, URINE: NEGATIVE
O2 SAT, ARTERIAL: 96.4 %
O2 THERAPY: ABNORMAL
PCO2 ARTERIAL: 40.7 MMHG (ref 35–45)
PDW BLD-RTO: 19.5 % (ref 11–16)
PERFORMED ON: ABNORMAL
PH ARTERIAL: 7.36 (ref 7.35–7.45)
PH UA: 5 (ref 5–8)
PLATELET # BLD: 159 K/UL (ref 150–400)
PMV BLD AUTO: 10.6 FL (ref 6–10)
PO2 ARTERIAL: 87.9 MMHG (ref 80–100)
POTASSIUM REFLEX MAGNESIUM: 5.4 MMOL/L (ref 3.4–5.1)
PRO-BNP: ABNORMAL PG/ML (ref 0–1800)
PROTEIN UA: 100 MG/DL
RBC # BLD: 4.03 M/UL (ref 3.8–5.8)
RBC UA: ABNORMAL /HPF (ref 0–4)
SODIUM BLD-SCNC: 142 MMOL/L (ref 136–145)
SPECIFIC GRAVITY UA: 1.02 (ref 1–1.03)
TCO2 ARTERIAL: 23.6 MMOL/L (ref 24–30)
TOTAL PROTEIN: 7.3 G/DL (ref 6.4–8.3)
TROPONIN: <0.3 NG/ML
TSH SERPL DL<=0.05 MIU/L-ACNC: 11.68 UIU/ML (ref 0.35–5.5)
URINE TYPE: ABNORMAL
UROBILINOGEN, URINE: 1 E.U./DL
WBC # BLD: 10.9 K/UL (ref 4–11)
WBC UA: ABNORMAL /HPF (ref 0–5)

## 2020-04-11 PROCEDURE — 2580000003 HC RX 258: Performed by: FAMILY MEDICINE

## 2020-04-11 PROCEDURE — 96374 THER/PROPH/DIAG INJ IV PUSH: CPT

## 2020-04-11 PROCEDURE — 36600 WITHDRAWAL OF ARTERIAL BLOOD: CPT

## 2020-04-11 PROCEDURE — 85025 COMPLETE CBC W/AUTO DIFF WBC: CPT

## 2020-04-11 PROCEDURE — 2580000003 HC RX 258: Performed by: PHYSICIAN ASSISTANT

## 2020-04-11 PROCEDURE — 6370000000 HC RX 637 (ALT 250 FOR IP): Performed by: FAMILY MEDICINE

## 2020-04-11 PROCEDURE — 2500000003 HC RX 250 WO HCPCS: Performed by: PHYSICIAN ASSISTANT

## 2020-04-11 PROCEDURE — 87186 SC STD MICRODIL/AGAR DIL: CPT

## 2020-04-11 PROCEDURE — 81001 URINALYSIS AUTO W/SCOPE: CPT

## 2020-04-11 PROCEDURE — 99285 EMERGENCY DEPT VISIT HI MDM: CPT

## 2020-04-11 PROCEDURE — 83880 ASSAY OF NATRIURETIC PEPTIDE: CPT

## 2020-04-11 PROCEDURE — 99223 1ST HOSP IP/OBS HIGH 75: CPT | Performed by: INTERNAL MEDICINE

## 2020-04-11 PROCEDURE — 36415 COLL VENOUS BLD VENIPUNCTURE: CPT

## 2020-04-11 PROCEDURE — 6370000000 HC RX 637 (ALT 250 FOR IP): Performed by: INTERNAL MEDICINE

## 2020-04-11 PROCEDURE — 82803 BLOOD GASES ANY COMBINATION: CPT

## 2020-04-11 PROCEDURE — 80053 COMPREHEN METABOLIC PANEL: CPT

## 2020-04-11 PROCEDURE — 6360000002 HC RX W HCPCS: Performed by: FAMILY MEDICINE

## 2020-04-11 PROCEDURE — 84443 ASSAY THYROID STIM HORMONE: CPT

## 2020-04-11 PROCEDURE — 6370000000 HC RX 637 (ALT 250 FOR IP): Performed by: PHYSICIAN ASSISTANT

## 2020-04-11 PROCEDURE — 1200000000 HC SEMI PRIVATE

## 2020-04-11 PROCEDURE — 84484 ASSAY OF TROPONIN QUANT: CPT

## 2020-04-11 PROCEDURE — 71045 X-RAY EXAM CHEST 1 VIEW: CPT

## 2020-04-11 PROCEDURE — 87077 CULTURE AEROBIC IDENTIFY: CPT

## 2020-04-11 PROCEDURE — 93005 ELECTROCARDIOGRAM TRACING: CPT

## 2020-04-11 PROCEDURE — 6360000002 HC RX W HCPCS: Performed by: PHYSICIAN ASSISTANT

## 2020-04-11 PROCEDURE — 87086 URINE CULTURE/COLONY COUNT: CPT

## 2020-04-11 RX ORDER — ARGININE 500 MG
500 TABLET ORAL DAILY
Status: DISCONTINUED | OUTPATIENT
Start: 2020-04-11 | End: 2020-04-14 | Stop reason: HOSPADM

## 2020-04-11 RX ORDER — ATORVASTATIN CALCIUM 10 MG/1
10 TABLET, FILM COATED ORAL DAILY
Status: DISCONTINUED | OUTPATIENT
Start: 2020-04-11 | End: 2020-04-14 | Stop reason: HOSPADM

## 2020-04-11 RX ORDER — ECHINACEA PURPUREA EXTRACT 125 MG
1 TABLET ORAL 2 TIMES DAILY
Status: DISCONTINUED | OUTPATIENT
Start: 2020-04-11 | End: 2020-04-14 | Stop reason: HOSPADM

## 2020-04-11 RX ORDER — DEXTROSE MONOHYDRATE 50 MG/ML
100 INJECTION, SOLUTION INTRAVENOUS PRN
Status: DISCONTINUED | OUTPATIENT
Start: 2020-04-11 | End: 2020-04-14 | Stop reason: HOSPADM

## 2020-04-11 RX ORDER — ASCORBIC ACID 500 MG
500 TABLET ORAL DAILY
Status: DISCONTINUED | OUTPATIENT
Start: 2020-04-11 | End: 2020-04-14 | Stop reason: HOSPADM

## 2020-04-11 RX ORDER — M-VIT,TX,IRON,MINS/CALC/FOLIC 27MG-0.4MG
1 TABLET ORAL DAILY
Status: DISCONTINUED | OUTPATIENT
Start: 2020-04-11 | End: 2020-04-14 | Stop reason: HOSPADM

## 2020-04-11 RX ORDER — DOCUSATE SODIUM 100 MG/1
100 CAPSULE, LIQUID FILLED ORAL 2 TIMES DAILY
Status: DISCONTINUED | OUTPATIENT
Start: 2020-04-11 | End: 2020-04-14 | Stop reason: HOSPADM

## 2020-04-11 RX ORDER — FERROUS SULFATE 325(65) MG
325 TABLET ORAL 2 TIMES DAILY WITH MEALS
Status: DISCONTINUED | OUTPATIENT
Start: 2020-04-11 | End: 2020-04-13 | Stop reason: ALTCHOICE

## 2020-04-11 RX ORDER — PANTOPRAZOLE SODIUM 40 MG/1
40 TABLET, DELAYED RELEASE ORAL DAILY
Status: DISCONTINUED | OUTPATIENT
Start: 2020-04-11 | End: 2020-04-14 | Stop reason: HOSPADM

## 2020-04-11 RX ORDER — NEBIVOLOL 5 MG/1
2.5 TABLET ORAL DAILY
Status: DISCONTINUED | OUTPATIENT
Start: 2020-04-11 | End: 2020-04-14 | Stop reason: HOSPADM

## 2020-04-11 RX ORDER — ZINC SULFATE 50(220)MG
220 CAPSULE ORAL 2 TIMES DAILY
Status: DISCONTINUED | OUTPATIENT
Start: 2020-04-11 | End: 2020-04-14 | Stop reason: HOSPADM

## 2020-04-11 RX ORDER — NICOTINE POLACRILEX 4 MG
15 LOZENGE BUCCAL PRN
Status: DISCONTINUED | OUTPATIENT
Start: 2020-04-11 | End: 2020-04-14 | Stop reason: HOSPADM

## 2020-04-11 RX ORDER — AMIODARONE HYDROCHLORIDE 200 MG/1
100 TABLET ORAL
Status: DISCONTINUED | OUTPATIENT
Start: 2020-04-11 | End: 2020-04-14 | Stop reason: HOSPADM

## 2020-04-11 RX ORDER — ACETAMINOPHEN 325 MG/1
650 TABLET ORAL EVERY 4 HOURS PRN
Status: DISCONTINUED | OUTPATIENT
Start: 2020-04-11 | End: 2020-04-14 | Stop reason: HOSPADM

## 2020-04-11 RX ORDER — DOXYCYCLINE 100 MG/1
100 CAPSULE ORAL EVERY 12 HOURS SCHEDULED
Status: DISCONTINUED | OUTPATIENT
Start: 2020-04-11 | End: 2020-04-13

## 2020-04-11 RX ORDER — HYDROCODONE POLISTIREX AND CHLORPHENIRAMINE POLISTIREX 10; 8 MG/5ML; MG/5ML
5 SUSPENSION, EXTENDED RELEASE ORAL ONCE
Status: COMPLETED | OUTPATIENT
Start: 2020-04-11 | End: 2020-04-11

## 2020-04-11 RX ORDER — POLYETHYLENE GLYCOL 3350 17 G/17G
17 POWDER, FOR SOLUTION ORAL 2 TIMES DAILY
Status: DISCONTINUED | OUTPATIENT
Start: 2020-04-11 | End: 2020-04-11

## 2020-04-11 RX ORDER — LEVOTHYROXINE SODIUM 0.07 MG/1
150 TABLET ORAL DAILY
Status: DISCONTINUED | OUTPATIENT
Start: 2020-04-11 | End: 2020-04-12

## 2020-04-11 RX ORDER — FUROSEMIDE 10 MG/ML
40 INJECTION INTRAMUSCULAR; INTRAVENOUS ONCE
Status: COMPLETED | OUTPATIENT
Start: 2020-04-11 | End: 2020-04-11

## 2020-04-11 RX ORDER — POLYETHYLENE GLYCOL 3350 17 G/17G
17 POWDER, FOR SOLUTION ORAL DAILY PRN
Status: DISCONTINUED | OUTPATIENT
Start: 2020-04-11 | End: 2020-04-14 | Stop reason: HOSPADM

## 2020-04-11 RX ORDER — LACTOBACILLUS RHAMNOSUS GG 10B CELL
1 CAPSULE ORAL 2 TIMES DAILY WITH MEALS
Status: DISCONTINUED | OUTPATIENT
Start: 2020-04-11 | End: 2020-04-14 | Stop reason: HOSPADM

## 2020-04-11 RX ORDER — SACCHAROMYCES BOULARDII 250 MG
250 CAPSULE ORAL 2 TIMES DAILY
Status: DISCONTINUED | OUTPATIENT
Start: 2020-04-11 | End: 2020-04-11 | Stop reason: ALTCHOICE

## 2020-04-11 RX ORDER — BENZONATATE 100 MG/1
100 CAPSULE ORAL ONCE
Status: COMPLETED | OUTPATIENT
Start: 2020-04-11 | End: 2020-04-11

## 2020-04-11 RX ORDER — DEXTROSE MONOHYDRATE 25 G/50ML
12.5 INJECTION, SOLUTION INTRAVENOUS PRN
Status: DISCONTINUED | OUTPATIENT
Start: 2020-04-11 | End: 2020-04-14 | Stop reason: HOSPADM

## 2020-04-11 RX ORDER — BUMETANIDE 0.25 MG/ML
2 INJECTION, SOLUTION INTRAMUSCULAR; INTRAVENOUS ONCE
Status: COMPLETED | OUTPATIENT
Start: 2020-04-11 | End: 2020-04-11

## 2020-04-11 RX ORDER — ISOSORBIDE MONONITRATE 30 MG/1
30 TABLET, EXTENDED RELEASE ORAL DAILY
Status: DISCONTINUED | OUTPATIENT
Start: 2020-04-11 | End: 2020-04-14 | Stop reason: HOSPADM

## 2020-04-11 RX ORDER — INSULIN GLARGINE 100 [IU]/ML
12 INJECTION, SOLUTION SUBCUTANEOUS NIGHTLY
Status: DISCONTINUED | OUTPATIENT
Start: 2020-04-11 | End: 2020-04-14 | Stop reason: HOSPADM

## 2020-04-11 RX ORDER — ASPIRIN 81 MG/1
81 TABLET ORAL DAILY
Status: DISCONTINUED | OUTPATIENT
Start: 2020-04-11 | End: 2020-04-14 | Stop reason: HOSPADM

## 2020-04-11 RX ADMIN — DOCUSATE SODIUM 100 MG: 100 CAPSULE, LIQUID FILLED ORAL at 21:07

## 2020-04-11 RX ADMIN — LEVOTHYROXINE SODIUM 150 MCG: 75 TABLET ORAL at 12:11

## 2020-04-11 RX ADMIN — BUMETANIDE 1 MG/HR: 0.25 INJECTION, SOLUTION INTRAMUSCULAR; INTRAVENOUS at 17:22

## 2020-04-11 RX ADMIN — ISOSORBIDE MONONITRATE 30 MG: 30 TABLET, EXTENDED RELEASE ORAL at 12:11

## 2020-04-11 RX ADMIN — ZINC SULFATE 220 MG (50 MG) CAPSULE 50 MG: CAPSULE at 21:06

## 2020-04-11 RX ADMIN — INSULIN LISPRO 1 UNITS: 100 INJECTION, SOLUTION INTRAVENOUS; SUBCUTANEOUS at 11:27

## 2020-04-11 RX ADMIN — Medication 1 CAPSULE: at 17:58

## 2020-04-11 RX ADMIN — INSULIN LISPRO 1 UNITS: 100 INJECTION, SOLUTION INTRAVENOUS; SUBCUTANEOUS at 21:07

## 2020-04-11 RX ADMIN — APIXABAN 5 MG: 5 TABLET, FILM COATED ORAL at 12:11

## 2020-04-11 RX ADMIN — MULTIPLE VITAMINS W/ MINERALS TAB 1 TABLET: TAB at 12:11

## 2020-04-11 RX ADMIN — ASPIRIN 81 MG: 81 TABLET, COATED ORAL at 12:11

## 2020-04-11 RX ADMIN — Medication 5 ML: at 05:42

## 2020-04-11 RX ADMIN — INSULIN GLARGINE 12 UNITS: 100 INJECTION, SOLUTION SUBCUTANEOUS at 01:02

## 2020-04-11 RX ADMIN — ZINC SULFATE 220 MG (50 MG) CAPSULE 200 MG: CAPSULE at 12:12

## 2020-04-11 RX ADMIN — MEROPENEM 1 G: 1 INJECTION, POWDER, FOR SOLUTION INTRAVENOUS at 07:25

## 2020-04-11 RX ADMIN — NEBIVOLOL HYDROCHLORIDE 2.5 MG: 5 TABLET ORAL at 12:11

## 2020-04-11 RX ADMIN — DOXYCYCLINE 100 MG: 100 CAPSULE ORAL at 21:06

## 2020-04-11 RX ADMIN — SALINE NASAL SPRAY 1 SPRAY: 1.5 SOLUTION NASAL at 12:12

## 2020-04-11 RX ADMIN — FUROSEMIDE 40 MG: 10 INJECTION, SOLUTION INTRAMUSCULAR; INTRAVENOUS at 05:42

## 2020-04-11 RX ADMIN — PANTOPRAZOLE SODIUM 40 MG: 40 TABLET, DELAYED RELEASE ORAL at 12:11

## 2020-04-11 RX ADMIN — DOXYCYCLINE 100 MG: 100 CAPSULE ORAL at 12:12

## 2020-04-11 RX ADMIN — SALINE NASAL SPRAY 1 SPRAY: 1.5 SOLUTION NASAL at 21:07

## 2020-04-11 RX ADMIN — BENZONATATE 100 MG: 100 CAPSULE ORAL at 05:42

## 2020-04-11 RX ADMIN — BUMETANIDE 2 MG: 0.25 INJECTION INTRAMUSCULAR; INTRAVENOUS at 12:11

## 2020-04-11 RX ADMIN — MEROPENEM 1 G: 1 INJECTION, POWDER, FOR SOLUTION INTRAVENOUS at 17:22

## 2020-04-11 RX ADMIN — SILVER SULFADIAZINE: 10 CREAM TOPICAL at 12:20

## 2020-04-11 RX ADMIN — AMIODARONE HYDROCHLORIDE 100 MG: 200 TABLET ORAL at 12:11

## 2020-04-11 RX ADMIN — INSULIN LISPRO 1 UNITS: 100 INJECTION, SOLUTION INTRAVENOUS; SUBCUTANEOUS at 17:24

## 2020-04-11 RX ADMIN — ATORVASTATIN CALCIUM 10 MG: 10 TABLET, FILM COATED ORAL at 12:12

## 2020-04-11 RX ADMIN — OXYCODONE HYDROCHLORIDE AND ACETAMINOPHEN 500 MG: 500 TABLET ORAL at 12:11

## 2020-04-11 RX ADMIN — APIXABAN 5 MG: 5 TABLET, FILM COATED ORAL at 21:07

## 2020-04-11 ASSESSMENT — ENCOUNTER SYMPTOMS
SHORTNESS OF BREATH: 1
COUGH: 1
DIARRHEA: 1

## 2020-04-11 ASSESSMENT — PAIN SCALES - GENERAL
PAINLEVEL_OUTOF10: 5
PAINLEVEL_OUTOF10: 0

## 2020-04-11 NOTE — PROGRESS NOTES
Patient stated she does not know what medications she takes and neither does the family.  Called [facility] here in [place] and had a list faxed of current list.

## 2020-04-11 NOTE — ED PROVIDER NOTES
[address] [place] [address] Court  eMERGENCY dEPARTMENT eNCOUnter      Pt Name: Viola Fonseca  MRN: [id number]  [place] 1960  Date of evaluation: 4/11/2020  Provider: Rosa Vieira, [address] [place] [address]       Chief Complaint   Patient presents with    Shortness of Breath         HISTORY OF PRESENT ILLNESS   (Location/Symptom, Timing/Onset, Context/Setting, Quality, Duration, Modifying Factors, Severity)  Note limiting factors. Viola Fonseca is a 62 y.o. female who presents to the emergency department for having leg and abdominal swelling. \"I have fluid that is building up on my legs\". Pt states that 2-3 days ago, she started having a cough with nonproductive cough. Denies fever, hasn't had any COVID19 exposure and hasn't been anywhere except for her doctor. She was seen at [facility] 3 days ago. She was seen for the same thing. She said she doesn't know the diagnosis. They gave her some diuretics and told her to follow up with her PCP. She was put on Lasix to take over the weekend. States she has had a 20 lb weight gain. She doesn't walk much at home. She was in the nursing home in Jan and was there for 3 months for rehab. She was released in Jan. No fever. Pt says she has a headache. No chest pain. Mild shortness of breath. No vomiting or diarrhea. No dysuria. History of chronic lymphedema of lower extremities and up to her pannus of her abdomen. Started having some diarrhea this morning. Nursing Notes were reviewed. REVIEW OF SYSTEMS    (2-9 systems for level 4, 10 or more forlevel 5)     Review of Systems   Constitutional:        20 lb weight gain   Respiratory: Positive for cough and shortness of breath. Cardiovascular: Positive for leg swelling. Gastrointestinal: Positive for diarrhea. Neurological: Positive for headaches. All other systems reviewed and are negative.           PAST MEDICAL HISTORY     Past Medical History:   Diagnosis Date    Acute metabolic TABLET    Take 1 tablet by mouth daily    NEBIVOLOL (BYSTOLIC) 2.5 MG TABLET    Take 1 tablet by mouth daily    NOVOLOG 100 UNIT/ML INJECTION VIAL    USE SUBCUTANEOUS 3 TIMES A DAY BEFORE MEALS LOW DOSE SLIDING SCALE ENCLOSED    PANTOPRAZOLE (PROTONIX) 40 MG TABLET    Take 1 tablet by mouth daily    POLYETHYLENE GLYCOL (MIRALAX) POWDER    Take 17 g by mouth 2 times daily    RA VITAMIN C 500 MG TABLET    TAKE 1 TABLET BY MOUTH DAILY    SACCHAROMYCES BOULARDII (FLORASTOR) 250 MG CAPSULE    Take 250 mg by mouth 2 times daily    SIMVASTATIN (ZOCOR) 20 MG TABLET    take 1 tablet by mouth at bedtime    SIMVASTATIN (ZOCOR) 20 MG TABLET    Take 1 tablet by mouth nightly    SKIN PROTECTANTS, MISC. (DIMETHICONE-ZINC OXIDE) CREAM    Apply 1 Dose topically as needed for Dry Skin (for redness) Apply topically 2 times daily as needed.     VITAMIN C (ASCORBIC ACID) 500 MG TABLET    Take 1 tablet by mouth daily    ZINC SULFATE (ZINCATE) 220 (50 ZN) MG CAPSULE    Take 1 capsule by mouth 2 times daily       ALLERGIES     Metformin and related    FAMILY HISTORY       Family History   Problem Relation Age of Onset    Asthma Mother     High Blood Pressure Father     Stroke Father           SOCIAL HISTORY       Social History     Socioeconomic History    Marital status: Single     Spouse name: None    Number of children: None    Years of education: None    Highest education level: None   Occupational History    None   Social Needs    Financial resource strain: None    Food insecurity     Worry: None     Inability: None    Transportation needs     Medical: None     Non-medical: None   Tobacco Use    Smoking status: Never Smoker    Smokeless tobacco: Never Used   Substance and Sexual Activity    Alcohol use: No    Drug use: No    Sexual activity: None   Lifestyle    Physical activity     Days per week: None     Minutes per session: None    Stress: None   Relationships    Social connections     Talks on phone: None Gets together: None     Attends [employer]al service: None     Active member of club or organization: None     Attends meetings of clubs or organizations: None     Relationship status: None    Intimate partner violence     Fear of current or ex partner: None     Emotionally abused: None     Physically abused: None     Forced sexual activity: None   Other Topics Concern    None   Social History Narrative    None       SCREENINGS             PHYSICAL EXAM    (up to 7 for level 4, 8 or more for level 5)     ED Triage Vitals [04/11/20 0443]   BP Temp Temp Source Pulse Resp SpO2 Height Weight   (!) 141/85 97.8 °F (36.6 °C) Oral 103 9 98 % 5' 6\" (1.676 m) 300 lb (136.1 kg)       Physical Exam  Vitals signs and nursing note reviewed. Constitutional:       Comments: Morbidly obese female coughing in the room   HENT:      Head: Normocephalic and atraumatic. Mouth/Throat:      Mouth: Mucous membranes are moist.      Pharynx: Oropharynx is clear. Eyes:      Extraocular Movements: Extraocular movements intact. Conjunctiva/sclera: Conjunctivae normal.      Pupils: Pupils are equal, round, and reactive to light. Neck:      Musculoskeletal: Neck supple. Cardiovascular:      Rate and Rhythm: Tachycardia present. Rhythm irregular. Heart sounds: Normal heart sounds. Pulmonary:      Effort: Pulmonary effort is normal. No respiratory distress. Breath sounds: No wheezing. Comments: Mild rales at posterior bases; Slight decreased breath sounds throughout  Abdominal:      Palpations: Abdomen is soft. Comments: Pt with large pannus to her abdomen with orange peel skin texture changes and lymphedema of the lower abdomen   Musculoskeletal:      Right lower leg: Edema present. Left lower leg: Edema present. Comments: Pt with chronic lymphedema and nonpitting edema of the lower extremities with lower legs wrapped with Coban   Skin:     General: Skin is warm and dry.    Neurological:      Mental African American 20 (*)     Alkaline Phosphatase 191 (*)     All other components within normal limits    Narrative:     Performed at:  [address] [place] [address] Laboratory  [address] [place], [place] [address], [place] [address]   Phone [phone]   BRAIN NATRIURETIC PEPTIDE - Abnormal; Notable for the following components:    Pro-BNP 15,603 (*)     All other components within normal limits    Narrative:     Performed at:  [address] [place] [address] Laboratory  [address] [place], [place] [address], [place] [address]   Phone [phone]   URINALYSIS - Abnormal; Notable for the following components:    Blood, Urine MODERATE (*)     Protein,  (*)     Leukocyte Esterase, Urine SMALL (*)     All other components within normal limits    Narrative:     Performed at:  [address] [place] [address] Laboratory  [address] [place], [place] [address], [place] [address]   Phone [phone]   TSH WITHOUT REFLEX - Abnormal; Notable for the following components:    TSH 11.68 (*)     All other components within normal limits    Narrative:     Performed at:  [address] [place] [address] Laboratory  [address] [place], [place] [address] [address] [address]   Phone [phone]   MICROSCOPIC URINALYSIS - Abnormal; Notable for the following components:    WBC, UA 11-20 (*)     RBC, UA 21-50 (*)     Bacteria, UA 2+ (*)     All other components within normal limits    Narrative:     Performed at:  [address] [place] [address] Laboratory  [address] [place], [place] [address] [address] [address]   Phone [phone]   CULTURE, URINE   TROPONIN    Narrative:     Performed at:  [address] [place] [address] Laboratory  [address] [place], [place] [address] [address] [address]   Phone [phone]       All other labs were within normal range or not returned as of this dictation.     EMERGENCY DEPARTMENT COURSE and DIFFERENTIAL DIAGNOSIS/MDM:   Vitals:    Vitals:    04/11/20 0615 04/11/20 0630 04/11/20 0645 04/11/20

## 2020-04-11 NOTE — H&P
History and Physical    Patient:  Kang Daley    CHIEF COMPLAINT:    Shortness of breath, swelling    HISTORY OF PRESENT ILLNESS:   The patient is a 62 y.o. female with PMH of diastolic HF, atrial fibrillation, CKD, DM II, HTN who presents due to shortness of breath and swelling. Patient states over the last 2 weeks she has had worsening swelling in her legs that moved up to her abdomen. Reports 20 lb weight gain. Was taking lasix as needed per her report. She went to [facility] ED on 4/8 due to swelling and they discharged her home to follow up with PCP. She was told to take lasix 20 mg daily by her pcp. She did this but then became short of air so she came to ED. Reports occasional dry cough. No fever, sweats, chills. No known sick contacts. States she does not leave her home. She says she takes medications as prescribed but doesn't know what medications she is on. Of note she was at [address] [facility] for several months but went home in January where she lives with her brother and sister.      Past Medical History:      Diagnosis Date    Acute metabolic encephalopathy     Acute renal failure (ARF) (HCC)     Anemia     Chronic venous insufficiency     Dysphagia     GERD (gastroesophageal reflux disease) 12/2013    Severe Reflux Esophagitis and hemorrhagic gastritis    Headache(784.0)     Hypertension     Hypothyroidism     Knee pain     Thrombocytopenia (HCC)     Type II or unspecified type diabetes mellitus without mention of complication, not stated as uncontrolled     Vitamin B12 deficiency        Past Surgical History:      Procedure Laterality Date    EYE SURGERY      LEG SURGERY      PA COLONOSCOPY FLX DX W/COLLJ SPEC WHEN PFRMD N/A 10/5/2018    COLONOSCOPY DIAGNOSTIC OR SCREENING performed by Rusty Cobos MD at [address] EGD TRANSORAL BIOPSY SINGLE/MULTIPLE N/A 10/5/2018    EGD BIOPSY performed by Rusty Cobos MD at [facility] ENDOSCOPY       Medications Prior to Admission: daily    Historical Provider, MD   Skin Protectants, Misc. (DIMETHICONE-ZINC OXIDE) cream Apply 1 Dose topically as needed for Dry Skin (for redness) Apply topically 2 times daily as needed. Historical Provider, MD   lidocaine (LIDODERM) 5 % Place 1 patch onto the skin daily Apply to left knee every morning and remove every evening (12 hours on, 12 hours off). Historical Provider, MD   Ferrous Sulfate (IRON) 325 (65 Fe) MG TABS take 1 tablet by mouth twice a day with food  Patient taking differently: 2 times daily  4/4/19   JOSE Soto   docusate sodium (COLACE) 100 MG capsule Take 100 mg by mouth 2 times daily    Historical Provider, MD   saccharomyces boulardii (FLORASTOR) 250 MG capsule Take 250 mg by mouth 2 times daily    Historical Provider, MD   polyethylene glycol (MIRALAX) powder Take 17 g by mouth 2 times daily    Historical Provider, MD   arginine 500 MG tablet Take 500 mg by mouth daily    Historical Provider, MD   acetaminophen (TYLENOL) 325 MG tablet Take 650 mg by mouth every 6 hours as needed for Pain    Historical Provider, MD   Multiple Vitamins-Minerals (THERAPEUTIC MULTIVITAMIN-MINERALS) tablet Take 1 tablet by mouth daily    Historical Provider, MD   RA VITAMIN C 500 MG tablet TAKE 1 TABLET BY MOUTH DAILY 11/27/18   JOSE Soto   blood glucose test strips (FREESTYLE TEST STRIPS) strip Daily dx:250.00 7/26/18   JOSE Soto       Allergies:  Metformin and related    Social History:   TOBACCO:   reports that she has never smoked. She has never used smokeless tobacco.  ETOH:   reports no history of alcohol use.   OCCUPATION:  None     Family History:       Problem Relation Age of Onset    Asthma Mother     High Blood Pressure Father     Stroke Father        Review of system  Constitutional:  Denies fever or chills   Eyes:  Denies eye pain or redness  HENT:  Denies nasal congestion or sore throat   Respiratory:  Positive for cough and  shortness of breath Cardiovascular:  Denies chest pain or palpitations. Positive for lower extremity edema   GI:  Denies abdominal pain, nausea, vomiting, bloody stools or diarrhea . Positive for anasarca. :  Denies dysuria or frequency  Musculoskeletal:  Denies acute neck pain or body aches  Integument:  Denies rash or itching  Neurologic:  Denies headache, dizziness, numbness, tingling or unilateral weakness  Psychiatric:  Denies acute depression or acute anxiety      Vital Signs  Temp: 97.9 °F (36.6 °C)  Pulse: 100  Resp: 18  BP: (!) 146/92  SpO2: 95 %  O2 Device: Nasal cannula  O2 Flow Rate (L/min): 2 L/min    vital signs reviewed in electronic chart. Physical exam  Constitutional:  Well developed, obese, appears older than stated age no acute distress  Eyes:  PERRL, no scleral icterus, conjunctiva normal   HENT:  Atraumatic, external ears normal, nose normal, oropharynx moist, no pharyngeal exudates. Neck- supple, no JVD, no lymphadenopathy  Respiratory:  No respiratory distress on 2 L O2, diminshed bilateral bases, no wheezing, rales or rhonchi detected  Cardiovascular:  Irregularly irregular, no murmurs, no gallops, no rubs  GI:  Morbidly obese, distended, normal bowel sounds, nontender, no voluntary guarding  Musculoskeletal:  No cyanosis. Lymphedema bilateral lower extremities. Moving all extremities   Integument:  Warm and dry. Erythema to anterior left shin.   Neurologic:  Alert & oriented x 3, no apparent focal deficits noted   Psychiatric:  Speech and behavior appropriate         Lab Results   Component Value Date     04/11/2020    K 5.4 (H) 04/11/2020     04/11/2020    CO2 21 04/11/2020    BUN 46 (H) 04/11/2020    CREATININE 2.9 (H) 04/11/2020    GLUCOSE 131 (H) 04/11/2020    CALCIUM 8.9 04/11/2020    PROT 7.3 04/11/2020    LABALBU 4.2 04/11/2020    BILITOT 0.5 04/11/2020    ALKPHOS 191 (H) 04/11/2020    AST 12 04/11/2020    ALT 9 04/11/2020    LABGLOM 16 (L) 04/11/2020    GFRAA 20 (L) 04/11/2020

## 2020-04-12 LAB
A/G RATIO: 1.2 (ref 0.8–2)
ALBUMIN SERPL-MCNC: 3.8 G/DL (ref 3.4–4.8)
ALP BLD-CCNC: 170 U/L (ref 25–100)
ALT SERPL-CCNC: 8 U/L (ref 4–36)
ANION GAP SERPL CALCULATED.3IONS-SCNC: 13 MMOL/L (ref 3–16)
AST SERPL-CCNC: 11 U/L (ref 8–33)
BILIRUB SERPL-MCNC: 0.5 MG/DL (ref 0.3–1.2)
BUN BLDV-MCNC: 48 MG/DL (ref 6–20)
CALCIUM SERPL-MCNC: 8.7 MG/DL (ref 8.5–10.5)
CHLORIDE BLD-SCNC: 106 MMOL/L (ref 98–107)
CO2: 23 MMOL/L (ref 20–30)
CREAT SERPL-MCNC: 3 MG/DL (ref 0.4–1.2)
GFR AFRICAN AMERICAN: 19
GFR NON-AFRICAN AMERICAN: 16
GLOBULIN: 3.1 G/DL
GLUCOSE BLD-MCNC: 111 MG/DL (ref 74–106)
GLUCOSE BLD-MCNC: 124 MG/DL (ref 74–106)
GLUCOSE BLD-MCNC: 139 MG/DL (ref 74–106)
GLUCOSE BLD-MCNC: 139 MG/DL (ref 74–106)
GLUCOSE BLD-MCNC: 197 MG/DL (ref 74–106)
HCT VFR BLD CALC: 31.6 % (ref 37–47)
HEMOGLOBIN: 9.1 G/DL (ref 11.5–16.5)
MCH RBC QN AUTO: 24.7 PG (ref 27–32)
MCHC RBC AUTO-ENTMCNC: 28.8 G/DL (ref 31–35)
MCV RBC AUTO: 85.9 FL (ref 80–100)
PDW BLD-RTO: 19.2 % (ref 11–16)
PERFORMED ON: ABNORMAL
PLATELET # BLD: 130 K/UL (ref 150–400)
PMV BLD AUTO: 10.3 FL (ref 6–10)
POTASSIUM SERPL-SCNC: 5.2 MMOL/L (ref 3.4–5.1)
RBC # BLD: 3.68 M/UL (ref 3.8–5.8)
SODIUM BLD-SCNC: 142 MMOL/L (ref 136–145)
TOTAL PROTEIN: 6.9 G/DL (ref 6.4–8.3)
WBC # BLD: 7.8 K/UL (ref 4–11)

## 2020-04-12 PROCEDURE — 80053 COMPREHEN METABOLIC PANEL: CPT

## 2020-04-12 PROCEDURE — 85027 COMPLETE CBC AUTOMATED: CPT

## 2020-04-12 PROCEDURE — 6360000002 HC RX W HCPCS: Performed by: PHYSICIAN ASSISTANT

## 2020-04-12 PROCEDURE — 36415 COLL VENOUS BLD VENIPUNCTURE: CPT

## 2020-04-12 PROCEDURE — 1200000000 HC SEMI PRIVATE

## 2020-04-12 PROCEDURE — 99232 SBSQ HOSP IP/OBS MODERATE 35: CPT | Performed by: INTERNAL MEDICINE

## 2020-04-12 PROCEDURE — 2500000003 HC RX 250 WO HCPCS: Performed by: PHYSICIAN ASSISTANT

## 2020-04-12 PROCEDURE — 2500000003 HC RX 250 WO HCPCS

## 2020-04-12 PROCEDURE — 2580000003 HC RX 258: Performed by: PHYSICIAN ASSISTANT

## 2020-04-12 PROCEDURE — 6370000000 HC RX 637 (ALT 250 FOR IP): Performed by: PHYSICIAN ASSISTANT

## 2020-04-12 PROCEDURE — 6370000000 HC RX 637 (ALT 250 FOR IP): Performed by: INTERNAL MEDICINE

## 2020-04-12 RX ORDER — BUMETANIDE 0.25 MG/ML
INJECTION, SOLUTION INTRAMUSCULAR; INTRAVENOUS
Status: COMPLETED
Start: 2020-04-12 | End: 2020-04-12

## 2020-04-12 RX ADMIN — INSULIN LISPRO 1 UNITS: 100 INJECTION, SOLUTION INTRAVENOUS; SUBCUTANEOUS at 20:18

## 2020-04-12 RX ADMIN — LEVOTHYROXINE SODIUM 150 MCG: 75 TABLET ORAL at 05:15

## 2020-04-12 RX ADMIN — BUMETANIDE 1 MG/HR: 0.25 INJECTION, SOLUTION INTRAMUSCULAR; INTRAVENOUS at 10:51

## 2020-04-12 RX ADMIN — ZINC SULFATE 220 MG (50 MG) CAPSULE 200 MG: CAPSULE at 07:42

## 2020-04-12 RX ADMIN — MEROPENEM 1 G: 1 INJECTION, POWDER, FOR SOLUTION INTRAVENOUS at 05:15

## 2020-04-12 RX ADMIN — Medication 1 CAPSULE: at 17:21

## 2020-04-12 RX ADMIN — BUMETANIDE: 0.25 INJECTION INTRAMUSCULAR; INTRAVENOUS at 10:51

## 2020-04-12 RX ADMIN — Medication 1 CAPSULE: at 07:40

## 2020-04-12 RX ADMIN — OXYCODONE HYDROCHLORIDE AND ACETAMINOPHEN 500 MG: 500 TABLET ORAL at 07:40

## 2020-04-12 RX ADMIN — ASPIRIN 81 MG: 81 TABLET, COATED ORAL at 07:41

## 2020-04-12 RX ADMIN — NEBIVOLOL HYDROCHLORIDE 2.5 MG: 5 TABLET ORAL at 07:40

## 2020-04-12 RX ADMIN — ISOSORBIDE MONONITRATE 30 MG: 30 TABLET, EXTENDED RELEASE ORAL at 07:40

## 2020-04-12 RX ADMIN — PANTOPRAZOLE SODIUM 40 MG: 40 TABLET, DELAYED RELEASE ORAL at 07:40

## 2020-04-12 RX ADMIN — MULTIPLE VITAMINS W/ MINERALS TAB 1 TABLET: TAB at 07:40

## 2020-04-12 RX ADMIN — ZINC SULFATE 220 MG (50 MG) CAPSULE 50 MG: CAPSULE at 20:17

## 2020-04-12 RX ADMIN — SALINE NASAL SPRAY 1 SPRAY: 1.5 SOLUTION NASAL at 07:39

## 2020-04-12 RX ADMIN — DOCUSATE SODIUM 100 MG: 100 CAPSULE, LIQUID FILLED ORAL at 20:18

## 2020-04-12 RX ADMIN — INSULIN GLARGINE 12 UNITS: 100 INJECTION, SOLUTION SUBCUTANEOUS at 20:18

## 2020-04-12 RX ADMIN — MEROPENEM 1 G: 1 INJECTION, POWDER, FOR SOLUTION INTRAVENOUS at 17:21

## 2020-04-12 RX ADMIN — SILVER SULFADIAZINE: 10 CREAM TOPICAL at 07:40

## 2020-04-12 RX ADMIN — APIXABAN 5 MG: 5 TABLET, FILM COATED ORAL at 07:40

## 2020-04-12 RX ADMIN — DOXYCYCLINE 100 MG: 100 CAPSULE ORAL at 07:40

## 2020-04-12 RX ADMIN — ATORVASTATIN CALCIUM 10 MG: 10 TABLET, FILM COATED ORAL at 07:40

## 2020-04-12 RX ADMIN — APIXABAN 5 MG: 5 TABLET, FILM COATED ORAL at 20:18

## 2020-04-12 RX ADMIN — DOXYCYCLINE 100 MG: 100 CAPSULE ORAL at 20:17

## 2020-04-12 ASSESSMENT — PAIN SCALES - GENERAL
PAINLEVEL_OUTOF10: 0
PAINLEVEL_OUTOF10: 0

## 2020-04-12 NOTE — PROGRESS NOTES
Progress Note      Subjective:   Chief complaint: Swelling and shortness of breath    Interval History:   Patient resting in bed this morning. She is on 2 L of oxygen. States her swelling has gone down some. She isn't sure how much she weighed when she felt well prior to this episode. Review of systems:   Constitutional:  Denies fever or chills . postiive for fatigue. Eyes:  Denies eye pain or redness  HENT:  Denies nasal congestion or sore throat   Respiratory:  Positive for cough and  shortness of breath   Cardiovascular:  Denies chest pain or palpitations. Positive for lower extremity edema   GI:  Denies abdominal pain, nausea, vomiting, bloody stools or diarrhea . Positive for anasarca. :  Denies dysuria or frequency  Musculoskeletal:  Denies acute neck pain or body aches  Integument:  Denies rash or itching  Neurologic:  Denies headache, dizziness, numbness, tingling or unilateral weakness  Psychiatric:  Denies acute depression or acute anxiety       Past medical history, surgical history, family history and social history reviewed and unchanged compared to H&P earlier this admission.     Medications:   Scheduled Meds:   insulin lispro  0-6 Units Subcutaneous TID WC    insulin lispro  0-3 Units Subcutaneous Nightly    meropenem  1 g Intravenous Q12H    silver sulfADIAZINE   Topical Daily    sodium chloride  1 spray Each Nostril BID    amiodarone  100 mg Oral Q48H    arginine  500 mg Oral Daily    aspirin EC  81 mg Oral Daily    docusate sodium  100 mg Oral BID    ferrous sulfate  325 mg Oral BID WC    insulin glargine  12 Units Subcutaneous Nightly    isosorbide mononitrate  30 mg Oral Daily    levothyroxine  150 mcg Oral Daily    therapeutic multivitamin-minerals  1 tablet Oral Daily    pantoprazole  40 mg Oral Daily    ascorbic acid  500 mg Oral Daily    atorvastatin  10 mg Oral Daily    zinc sulfate  200 mg Oral BID    nebivolol  2.5 mg Oral Daily    apixaban  5 mg Oral BID    doxycycline monohydrate  100 mg Oral 2 times per day    lactobacillus  1 capsule Oral BID WC     Continuous Infusions:   bumetanide 0.1 mg/mL infusion 1 mg/hr (04/12/20 1051)    dextrose         Objective:     Vital Signs  Temp: 97.9 °F (36.6 °C)  Pulse: 102  Resp: 22  BP: 118/78  SpO2: 98 %  O2 Device: Nasal cannula  O2 Flow Rate (L/min): 2.5 L/min    Vital signs reviewed in electronic charts. Physical exam  Constitutional:  Well developed, obese, appears older than stated age no acute distress  Eyes:  PERRL, no scleral icterus, conjunctiva normal   HENT:  Atraumatic, external ears normal, nose normal, oropharynx moist, no pharyngeal exudates. Neck- supple, no JVD, no lymphadenopathy  Respiratory:  No respiratory distress on 2 L O2, diminshed bilateral bases, no wheezing, rales or rhonchi detected  Cardiovascular:  Irregularly irregular, no murmurs, no gallops, no rubs  GI:  Morbidly obese, distended and edematous pannus, normal bowel sounds, nontender, no voluntary guarding  Musculoskeletal:  No cyanosis. Lymphedema bilateral lower extremities. Moving all extremities   Integument:  Warm and dry. Erythema noted to bilateral anterior shins. Neurologic:  Alert & oriented x 3, no apparent focal deficits noted   Psychiatric:  Speech and behavior appropriate     Results:     Lab Results   Component Value Date    WBC 7.8 04/12/2020    HGB 9.1 (L) 04/12/2020    HCT 31.6 (L) 04/12/2020    MCV 85.9 04/12/2020     (L) 04/12/2020       Lab Results   Component Value Date     04/12/2020    K 5.2 04/12/2020    K 5.4 04/11/2020     04/12/2020    CO2 23 04/12/2020    BUN 48 04/12/2020    CREATININE 3.0 04/12/2020    GLUCOSE 111 04/12/2020    CALCIUM 8.7 04/12/2020        Assessment and Plan:      Active Hospital Problems    Diagnosis Date Noted    Acute on chronic diastolic heart failure ([facility] [address] [I50.33]  Patient presented with weight gain (last recorded weight 319 in August 2019), SOA, worsening edema, pulmonary edema noted on CXR. - Received IV lasix 40 mg in ED and IV bumex 2 mg on 4/11.   - Bumex gtt 1 mg x 6 hours on 4/11 with -1300 net fluid balance  - repeat bumex gtt 1 mg x 6 hours today   - Monitor daily weight and strict I&Os. Low sodium diet with fluid restriction.   - Most recent ECHO Aug 2019 as below. She follows with Dr. Zabrina Rinaldi  · The left ventricular cavity is mildly dilated. · Left ventricular wall thickness is consistent with mild concentric   Hypertrophy   · Left ventricular systolic function is mildly decreased. · Right ventricular cavity is mild-to-moderately dilated. · Mildly reduced right ventricular systolic function noted. · Left atrial cavity size is severely dilated. · Mild-to-moderate mitral valve regurgitation is present  · Moderate tricuspid valve regurgitation is present. · Calculated right ventricular systolic pressure from tricuspid   regurgitation is 44 mmHg. · There is a small (<1cm) circumferential pericardial effusion. 04/11/2020    Pneumonia [J18.9]  Patient reports cough and SOA. CXR with edema vs infiltrate. On merrem for uti. Add doxycycline to cover for pneumonia. On 2 L O2, wean as tolerated. No home o2. Encourage pulmonary toileting.  04/11/2020    Atrial fibrillation ([address] [I48.91]  Continue home regimen including eliquis, bystolic, and amiodarone 49/61/7191    Acute cystitis with hematuria [N30.01]  On merrem after reviewing previous urine cultures. Follow urine culture 06/03/2019    Morbid obesity ([address] [J90.61]  Complicates all aspects of care. Dietitian to follow. Encourage weight loss 02/05/2019    Hyperkalemia [E87.5]  5.4 on arrival. 5.2 today. Continue diuresis. Low potassium diet ordered. 01/09/2019    Stage 4 chronic kidney disease ([address] [N18.4]  Baseline Cr~ 2.2 per chart review (last lab dec 2019). Follows with Dr. Moody Sanz in past. Acute on chronic renal failure this admit with Cr 3. diuresing as above. Renally dose medications. Monitor.  Type 2 diabetes mellitus with complication (HCC) [M33.9]  Monitor fsbg achs and cover with ssi as needed. Continue home lantus regimen. Diabetic diet     Hypothyroidism [E03.9]  TSH elevated. patinet reports compliance with medications. Increase synthroid to 175 mcg. Repeat tsh in 4 weeks. Patient was seen and examined by Dr. John Arias and plan of care reviewed.       Electronically signed by KAREN Alcocer on 4/12/2020 at 11:36 AM

## 2020-04-13 PROBLEM — N39.0 UTI (URINARY TRACT INFECTION): Status: ACTIVE | Noted: 2020-04-13

## 2020-04-13 PROBLEM — R60.1 ANASARCA: Status: ACTIVE | Noted: 2020-04-13

## 2020-04-13 PROBLEM — N18.30 CHRONIC KIDNEY DISEASE (CKD), STAGE III (MODERATE) (HCC): Status: ACTIVE | Noted: 2020-04-13

## 2020-04-13 PROBLEM — I50.9 ACUTE ON CHRONIC HEART FAILURE (HCC): Status: ACTIVE | Noted: 2020-04-13

## 2020-04-13 LAB
A/G RATIO: 1.2 (ref 0.8–2)
ALBUMIN SERPL-MCNC: 3.7 G/DL (ref 3.4–4.8)
ALP BLD-CCNC: 174 U/L (ref 25–100)
ALT SERPL-CCNC: 8 U/L (ref 4–36)
ANION GAP SERPL CALCULATED.3IONS-SCNC: 13 MMOL/L (ref 3–16)
AST SERPL-CCNC: 11 U/L (ref 8–33)
BACTERIA SPEC AEROBE CULT: NORMAL
BACTERIA SPEC AEROBE CULT: NORMAL
BILIRUB SERPL-MCNC: 0.4 MG/DL (ref 0.3–1.2)
BUN BLDV-MCNC: 53 MG/DL (ref 6–20)
CALCIUM SERPL-MCNC: 8.7 MG/DL (ref 8.5–10.5)
CHLORIDE BLD-SCNC: 104 MMOL/L (ref 98–107)
CO2: 24 MMOL/L (ref 20–30)
CREAT SERPL-MCNC: 3.1 MG/DL (ref 0.4–1.2)
GFR AFRICAN AMERICAN: 18
GFR NON-AFRICAN AMERICAN: 15
GLOBULIN: 3.1 G/DL
GLUCOSE BLD-MCNC: 156 MG/DL (ref 74–106)
GLUCOSE BLD-MCNC: 169 MG/DL (ref 74–106)
GLUCOSE BLD-MCNC: 170 MG/DL (ref 74–106)
GLUCOSE BLD-MCNC: 172 MG/DL (ref 74–106)
GLUCOSE BLD-MCNC: 193 MG/DL (ref 74–106)
HCT VFR BLD CALC: 30.5 % (ref 37–47)
HEMOGLOBIN: 8.8 G/DL (ref 11.5–16.5)
LV EF: 53 %
LVEF MODALITY: NORMAL
MCH RBC QN AUTO: 24.7 PG (ref 27–32)
MCHC RBC AUTO-ENTMCNC: 28.9 G/DL (ref 31–35)
MCV RBC AUTO: 85.7 FL (ref 80–100)
PDW BLD-RTO: 19.3 % (ref 11–16)
PERFORMED ON: ABNORMAL
PLATELET # BLD: 135 K/UL (ref 150–400)
PMV BLD AUTO: 10.7 FL (ref 6–10)
POTASSIUM SERPL-SCNC: 4.9 MMOL/L (ref 3.4–5.1)
RBC # BLD: 3.56 M/UL (ref 3.8–5.8)
SODIUM BLD-SCNC: 141 MMOL/L (ref 136–145)
TOTAL PROTEIN: 6.8 G/DL (ref 6.4–8.3)
WBC # BLD: 5.6 K/UL (ref 4–11)

## 2020-04-13 PROCEDURE — 6360000002 HC RX W HCPCS: Performed by: PHYSICIAN ASSISTANT

## 2020-04-13 PROCEDURE — 99232 SBSQ HOSP IP/OBS MODERATE 35: CPT | Performed by: INTERNAL MEDICINE

## 2020-04-13 PROCEDURE — 6370000000 HC RX 637 (ALT 250 FOR IP): Performed by: INTERNAL MEDICINE

## 2020-04-13 PROCEDURE — 97165 OT EVAL LOW COMPLEX 30 MIN: CPT

## 2020-04-13 PROCEDURE — 97161 PT EVAL LOW COMPLEX 20 MIN: CPT

## 2020-04-13 PROCEDURE — 97535 SELF CARE MNGMENT TRAINING: CPT

## 2020-04-13 PROCEDURE — 97802 MEDICAL NUTRITION INDIV IN: CPT

## 2020-04-13 PROCEDURE — 93306 TTE W/DOPPLER COMPLETE: CPT

## 2020-04-13 PROCEDURE — 85027 COMPLETE CBC AUTOMATED: CPT

## 2020-04-13 PROCEDURE — 6370000000 HC RX 637 (ALT 250 FOR IP): Performed by: PHYSICIAN ASSISTANT

## 2020-04-13 PROCEDURE — 2580000003 HC RX 258: Performed by: PHYSICIAN ASSISTANT

## 2020-04-13 PROCEDURE — 2700000000 HC OXYGEN THERAPY PER DAY

## 2020-04-13 PROCEDURE — 36415 COLL VENOUS BLD VENIPUNCTURE: CPT

## 2020-04-13 PROCEDURE — 2500000003 HC RX 250 WO HCPCS: Performed by: INTERNAL MEDICINE

## 2020-04-13 PROCEDURE — 92610 EVALUATE SWALLOWING FUNCTION: CPT

## 2020-04-13 PROCEDURE — 97530 THERAPEUTIC ACTIVITIES: CPT

## 2020-04-13 PROCEDURE — 1200000000 HC SEMI PRIVATE

## 2020-04-13 PROCEDURE — 80053 COMPREHEN METABOLIC PANEL: CPT

## 2020-04-13 RX ORDER — BUMETANIDE 0.25 MG/ML
2 INJECTION, SOLUTION INTRAMUSCULAR; INTRAVENOUS EVERY 6 HOURS
Status: COMPLETED | OUTPATIENT
Start: 2020-04-13 | End: 2020-04-14

## 2020-04-13 RX ORDER — METOLAZONE 5 MG/1
5 TABLET ORAL ONCE
Status: COMPLETED | OUTPATIENT
Start: 2020-04-13 | End: 2020-04-13

## 2020-04-13 RX ORDER — BUMETANIDE 0.25 MG/ML
4 INJECTION, SOLUTION INTRAMUSCULAR; INTRAVENOUS ONCE
Status: DISCONTINUED | OUTPATIENT
Start: 2020-04-13 | End: 2020-04-13

## 2020-04-13 RX ORDER — FERROUS SULFATE TAB EC 324 MG (65 MG FE EQUIVALENT) 324 (65 FE) MG
324 TABLET DELAYED RESPONSE ORAL 2 TIMES DAILY WITH MEALS
Status: DISCONTINUED | OUTPATIENT
Start: 2020-04-13 | End: 2020-04-14 | Stop reason: HOSPADM

## 2020-04-13 RX ADMIN — ASPIRIN 81 MG: 81 TABLET, COATED ORAL at 09:33

## 2020-04-13 RX ADMIN — FERROUS SULFATE TAB EC 324 MG (65 MG FE EQUIVALENT) 324 MG: 324 (65 FE) TABLET DELAYED RESPONSE at 17:07

## 2020-04-13 RX ADMIN — INSULIN LISPRO 1 UNITS: 100 INJECTION, SOLUTION INTRAVENOUS; SUBCUTANEOUS at 09:38

## 2020-04-13 RX ADMIN — INSULIN LISPRO 1 UNITS: 100 INJECTION, SOLUTION INTRAVENOUS; SUBCUTANEOUS at 11:44

## 2020-04-13 RX ADMIN — MEROPENEM 1 G: 1 INJECTION, POWDER, FOR SOLUTION INTRAVENOUS at 17:09

## 2020-04-13 RX ADMIN — INSULIN LISPRO 1 UNITS: 100 INJECTION, SOLUTION INTRAVENOUS; SUBCUTANEOUS at 17:14

## 2020-04-13 RX ADMIN — Medication 1 CAPSULE: at 17:07

## 2020-04-13 RX ADMIN — APIXABAN 5 MG: 5 TABLET, FILM COATED ORAL at 09:33

## 2020-04-13 RX ADMIN — INSULIN LISPRO 1 UNITS: 100 INJECTION, SOLUTION INTRAVENOUS; SUBCUTANEOUS at 21:14

## 2020-04-13 RX ADMIN — NEBIVOLOL HYDROCHLORIDE 2.5 MG: 5 TABLET ORAL at 09:32

## 2020-04-13 RX ADMIN — ZINC SULFATE 220 MG (50 MG) CAPSULE 200 MG: CAPSULE at 21:13

## 2020-04-13 RX ADMIN — OXYCODONE HYDROCHLORIDE AND ACETAMINOPHEN 500 MG: 500 TABLET ORAL at 09:49

## 2020-04-13 RX ADMIN — LEVOTHYROXINE SODIUM 175 MCG: 125 TABLET ORAL at 05:08

## 2020-04-13 RX ADMIN — INSULIN GLARGINE 12 UNITS: 100 INJECTION, SOLUTION SUBCUTANEOUS at 21:14

## 2020-04-13 RX ADMIN — EPOETIN ALFA-EPBX 10000 UNITS: 10000 INJECTION, SOLUTION INTRAVENOUS; SUBCUTANEOUS at 17:08

## 2020-04-13 RX ADMIN — AMIODARONE HYDROCHLORIDE 100 MG: 200 TABLET ORAL at 11:43

## 2020-04-13 RX ADMIN — SILVER SULFADIAZINE: 10 CREAM TOPICAL at 09:35

## 2020-04-13 RX ADMIN — Medication 1 CAPSULE: at 09:31

## 2020-04-13 RX ADMIN — APIXABAN 5 MG: 5 TABLET, FILM COATED ORAL at 21:13

## 2020-04-13 RX ADMIN — BUMETANIDE 2 MG: 0.25 INJECTION INTRAMUSCULAR; INTRAVENOUS at 14:27

## 2020-04-13 RX ADMIN — ATORVASTATIN CALCIUM 10 MG: 10 TABLET, FILM COATED ORAL at 09:32

## 2020-04-13 RX ADMIN — DOXYCYCLINE 100 MG: 100 CAPSULE ORAL at 09:34

## 2020-04-13 RX ADMIN — SALINE NASAL SPRAY 1 SPRAY: 1.5 SOLUTION NASAL at 09:35

## 2020-04-13 RX ADMIN — PANTOPRAZOLE SODIUM 40 MG: 40 TABLET, DELAYED RELEASE ORAL at 09:33

## 2020-04-13 RX ADMIN — DOCUSATE SODIUM 100 MG: 100 CAPSULE, LIQUID FILLED ORAL at 09:33

## 2020-04-13 RX ADMIN — ZINC SULFATE 220 MG (50 MG) CAPSULE 200 MG: CAPSULE at 09:31

## 2020-04-13 RX ADMIN — ACETAMINOPHEN 650 MG: 325 TABLET, FILM COATED ORAL at 05:08

## 2020-04-13 RX ADMIN — BUMETANIDE 2 MG: 0.25 INJECTION INTRAMUSCULAR; INTRAVENOUS at 21:12

## 2020-04-13 RX ADMIN — FERROUS SULFATE TAB EC 324 MG (65 MG FE EQUIVALENT) 324 MG: 324 (65 FE) TABLET DELAYED RESPONSE at 09:49

## 2020-04-13 RX ADMIN — ISOSORBIDE MONONITRATE 30 MG: 30 TABLET, EXTENDED RELEASE ORAL at 09:34

## 2020-04-13 RX ADMIN — DOCUSATE SODIUM 100 MG: 100 CAPSULE, LIQUID FILLED ORAL at 21:13

## 2020-04-13 RX ADMIN — MULTIPLE VITAMINS W/ MINERALS TAB 1 TABLET: TAB at 09:34

## 2020-04-13 RX ADMIN — METOLAZONE 5 MG: 5 TABLET ORAL at 14:28

## 2020-04-13 RX ADMIN — MEROPENEM 1 G: 1 INJECTION, POWDER, FOR SOLUTION INTRAVENOUS at 05:08

## 2020-04-13 ASSESSMENT — PAIN DESCRIPTION - ORIENTATION: ORIENTATION: RIGHT;LEFT

## 2020-04-13 ASSESSMENT — PAIN DESCRIPTION - LOCATION: LOCATION: LEG

## 2020-04-13 ASSESSMENT — PAIN DESCRIPTION - PAIN TYPE: TYPE: ACUTE PAIN

## 2020-04-13 ASSESSMENT — PAIN SCALES - GENERAL: PAINLEVEL_OUTOF10: 7

## 2020-04-13 NOTE — PROGRESS NOTES
Time   Individual Concurrent Group Co-treatment   Time In 1423         Time Out 1458         Minutes 4[address] [facility], PT       This note serves as D/C summary if patient is discharged prior to next visit.

## 2020-04-13 NOTE — PROGRESS NOTES
Speech Language Pathology  Facility/Department: [facility] MED SURG   CLINICAL BEDSIDE SWALLOW EVALUATION    NAME: Sonali Dunlap  : 1960  MRN: [id number]    ADMISSION DATE: 2020  ADMITTING DIAGNOSIS: has Essential hypertension; GERD (gastroesophageal reflux disease); Type 2 diabetes mellitus with complication ([address] Hypothyroidism; Vitamin B12 deficiency; Bilateral edema of lower extremity; Anemia; Cellulitis of lower extremity; Abdominal pain, left lower quadrant; Chronic superficial gastritis without bleeding; Internal hemorrhoids without complication; Diverticulosis of large intestine without hemorrhage; Stage 4 chronic kidney disease ([address] Leukocytosis; Acute pulmonary edema ([address] Acute on chronic renal failure ([address] Hyperkalemia; Altered mental status; Hypothermia; Lymphadenopathy; Bradycardia; Morbid obesity ([address] Foot ulceration ([address] Pressure injury of skin of right heel; Nausea and vomiting; Acute cystitis with hematuria; Tachycardia; Declining functional status; History of atrial flutter; Atrial fibrillation ([address] Osteoarthritis of both knees; Lower extremity edema; Acute on chronic diastolic heart failure ([address] and Pneumonia on their problem list.  ONSET DATE: 2020    Recent Chest Xray/CT of Chest: (2020)     Impression   1.  Mild increased hazy opacity suspected in the mid to lower lungs    suggestive of edema and/or infiltrate. 2.  Prominence of the cardiac silhouette. 3.  Mild blunting of the left costophrenic angle suggestive of a small    pleural effusion versus pleural thickening.         Date of Eval: 2020  Evaluating Therapist: Iban Rajan    Current Diet level:  Current Diet : Regular    Primary Complaint  Patient Complaint: Patient reported being hospitalized d/t \"swelling\". Patient denied difficulty chewing or swallowing.      Pain:  Pain Assessment  Pain Assessment: (chest sore from coughing)  Pain Level: 7  Pain Type: Acute pain  Pain Location:

## 2020-04-13 NOTE — PROGRESS NOTES
Occupational Therapy  Facility/Department: [address] [place] [address] MED SURG  Daily Treatment Note  NAME: Isaura Hui  : 1960  MRN: [id number]    Date of Service: 2020    Discharge Recommendations:  Continue to assess pending progress       Assessment   Performance deficits / Impairments: Decreased functional mobility ; Decreased endurance;Decreased strength;Decreased ADL status  Assessment: Pt seen for second session on this date. Pt required max encouragement to come to sit at EOB. Pt required min x2 assist to come to sit at EOB. Pt repeatedly reports \"I can't do. Amy Lint Amy Lint \" howver, with encouragement is able to complete and assist with mobility and self care tasks. Pt transferred to [facility] with CGA x2 and come to stand with CGA. Pt required max  assist for toileting. Pt assisted to recliner. Pt left with call light in reach and BLE elevated. Prognosis: Good  Decision Making: Low Complexity  REQUIRES OT FOLLOW UP: Yes  Activity Tolerance  Activity Tolerance: Patient limited by fatigue;Patient limited by pain         Patient Diagnosis(es): The primary encounter diagnosis was Acute congestive heart failure, unspecified heart failure type ([address] Diagnoses of Peripheral edema, Chronic acquired lymphedema, Renal insufficiency, and Chronic atrial fibrillation were also pertinent to this visit. has a past medical history of Acute metabolic encephalopathy, Acute renal failure (ARF) (HCC), Anemia, Chronic venous insufficiency, Dysphagia, GERD (gastroesophageal reflux disease), Headache(784.0), Hypertension, Hypothyroidism, Knee pain, Thrombocytopenia ([address] Type II or unspecified type diabetes mellitus without mention of complication, not stated as uncontrolled, and Vitamin B12 deficiency.    has a past surgical history that includes Eye surgery; Leg Surgery; pr egd transoral biopsy single/multiple (N/A, 10/5/2018); and pr colonoscopy flx dx w/collj spec when pfrmd (N/A, 10/5/2018). Restrictions  Restrictions/Precautions  Restrictions/Precautions: Fall Risk, General Precautions  Subjective   General  Chart Reviewed: Yes  Patient assessed for rehabilitation services?: Yes  Family / Caregiver Present: No  Referring Practitioner: Asim Aviles PA-C  Diagnosis: COPD, generalized weakness,   Subjective  Subjective: Pt reports she has had a lot of fluid and having increased difficulty with mobility. Pain Assessment  Pain Assessment: (chest sore from coughing)  Vital Signs  Patient Currently in Pain: Yes   Orientation  Orientation  Overall Orientation Status: Within Functional Limits  Objective    ADL  LE Dressing: Maximum assistance  Toileting: Maximum assistance        Balance  Sitting Balance: Stand by assistance  Standing Balance: Stand by assistance  Bed mobility  Rolling to Right: Minimal assistance;Contact guard assistance  Supine to Sit: Minimal assistance;2 Person assistance  Scooting: Contact guard assistance  Transfers  Stand Pivot Transfers: Contact guard assistance;Minimal assistance;2 Person assistance  Sit to stand: Contact guard assistance;Minimal assistance                       Cognition  Cognition Comment: able to follow commands appropriately, decreased safety awareness noted. LUE AROM (degrees)  LUE AROM : WFL  RUE AROM (degrees)  RUE AROM : WFL                 Plan   Plan  Times per week: 3-5  Times per day: Daily  Plan weeks: 1-2  Current Treatment Recommendations: Strengthening, Functional Mobility Training, Endurance Training, Self-Care / ADL, Safety Education & Training, Balance Training, Patient/Caregiver Education & Training       Goals  Short term goals  Time Frame for Short term goals: 2 weeks  Short term goal 1: Pt to complete toileting with MOD I demo good safety awareness. Short term goal 2: pt to complete UB/LB dressing using AE as needed with MOD I.    Short term goal 3: pt to tolerate x20 minutes of activity to increase

## 2020-04-13 NOTE — PROGRESS NOTES
isosorbide mononitrate  30 mg Oral Daily    therapeutic multivitamin-minerals  1 tablet Oral Daily    pantoprazole  40 mg Oral Daily    ascorbic acid  500 mg Oral Daily    atorvastatin  10 mg Oral Daily    zinc sulfate  200 mg Oral BID    nebivolol  2.5 mg Oral Daily    apixaban  5 mg Oral BID    doxycycline monohydrate  100 mg Oral 2 times per day    lactobacillus  1 capsule Oral BID WC     Continuous Infusions:   dextrose         Objective:     Vital Signs  Temp: 97.6 °F (36.4 °C)  Pulse: 103  Resp: 18  BP: 123/80  SpO2: 97 %  O2 Device: Nasal cannula  O2 Flow Rate (L/min): 2.5 L/min    Vital signs reviewed in electronic charts. Physical exam    Constitutional:  Well developed, well nourished, morbidly obese female in no acute distress. Diffusely anasarca. On 2.5 L nasal cannula. Eyes:  PERRL, conjunctiva normal, EOMI. HENT:  Atraumatic, external ears normal, external nose/nares normal, oropharynx moist, no pharyngeal exudates. Neck:  Supple. No JVD or thyromegaly. Respiratory:  No respiratory distress, normal breath sounds, no rales, no wheezing. On 2.5 L nasal cannula. Cardiovascular: Tachycardic, normal rhythm, no murmurs, no gallops, no rubs. GI:  Soft, nondistended, normal bowel sounds, nontender, no organomegaly, no mass. Diffuse anasarca with dimpling of the abdominal skin. :  No costovertebral angle tenderness. Musculoskeletal: no tenderness, no obvious deformities. Patient is moving all extremities. 4+ bilateral lower extremity edema extending to mid thighs. Integument:  Well hydrated, no rash. Lymphatic:  No cervical or axillary lymphadenopathy noted. Neurologic:  Alert & oriented x 3,  no focal deficits noted. Strength is equal throughout. Psychiatric:  Speech and behavior appropriate.     Results:     Lab Results   Component Value Date    WBC 5.6 04/13/2020    HGB 8.8 (L) 04/13/2020    HCT 30.5 (L) 04/13/2020    MCV 85.7 04/13/2020     (L) 04/13/2020       Lab

## 2020-04-14 ENCOUNTER — HOSPITAL ENCOUNTER (INPATIENT)
Facility: HOSPITAL | Age: 60
LOS: 13 days | Discharge: HOME HEALTH | DRG: 291 | End: 2020-04-27
Attending: INTERNAL MEDICINE | Admitting: INTERNAL MEDICINE
Payer: MEDICARE

## 2020-04-14 VITALS
RESPIRATION RATE: 16 BRPM | HEART RATE: 102 BPM | TEMPERATURE: 97.8 F | HEIGHT: 66 IN | SYSTOLIC BLOOD PRESSURE: 155 MMHG | WEIGHT: 293 LBS | BODY MASS INDEX: 47.09 KG/M2 | OXYGEN SATURATION: 97 % | DIASTOLIC BLOOD PRESSURE: 92 MMHG

## 2020-04-14 LAB
A/G RATIO: 1.2 (ref 0.8–2)
ALBUMIN SERPL-MCNC: 3.7 G/DL (ref 3.4–4.8)
ALP BLD-CCNC: 173 U/L (ref 25–100)
ALT SERPL-CCNC: 8 U/L (ref 4–36)
ANION GAP SERPL CALCULATED.3IONS-SCNC: 12 MMOL/L (ref 3–16)
AST SERPL-CCNC: 13 U/L (ref 8–33)
BILIRUB SERPL-MCNC: 0.6 MG/DL (ref 0.3–1.2)
BUN BLDV-MCNC: 54 MG/DL (ref 6–20)
CALCIUM SERPL-MCNC: 8.7 MG/DL (ref 8.5–10.5)
CHLORIDE BLD-SCNC: 102 MMOL/L (ref 98–107)
CO2: 27 MMOL/L (ref 20–30)
CREAT SERPL-MCNC: 3 MG/DL (ref 0.4–1.2)
GFR AFRICAN AMERICAN: 19
GFR NON-AFRICAN AMERICAN: 16
GLOBULIN: 3.1 G/DL
GLUCOSE BLD-MCNC: 120 MG/DL (ref 74–106)
GLUCOSE BLD-MCNC: 132 MG/DL (ref 74–106)
GLUCOSE BLD-MCNC: 133 MG/DL (ref 74–106)
GLUCOSE BLD-MCNC: 137 MG/DL (ref 74–106)
GLUCOSE BLD-MCNC: 185 MG/DL (ref 74–106)
HCT VFR BLD CALC: 31.6 % (ref 37–47)
HEMOGLOBIN: 8.9 G/DL (ref 11.5–16.5)
MCH RBC QN AUTO: 24.3 PG (ref 27–32)
MCHC RBC AUTO-ENTMCNC: 28.2 G/DL (ref 31–35)
MCV RBC AUTO: 86.3 FL (ref 80–100)
ORGANISM: ABNORMAL
ORGANISM: ABNORMAL
PDW BLD-RTO: 19 % (ref 11–16)
PERFORMED ON: ABNORMAL
PHOSPHORUS: 4.8 MG/DL (ref 2.5–4.5)
PLATELET # BLD: 134 K/UL (ref 150–400)
PMV BLD AUTO: 10 FL (ref 6–10)
POTASSIUM SERPL-SCNC: 4.7 MMOL/L (ref 3.4–5.1)
RBC # BLD: 3.66 M/UL (ref 3.8–5.8)
SODIUM BLD-SCNC: 141 MMOL/L (ref 136–145)
TOTAL PROTEIN: 6.8 G/DL (ref 6.4–8.3)
URINE CULTURE, ROUTINE: ABNORMAL
URINE CULTURE, ROUTINE: ABNORMAL
WBC # BLD: 5.3 K/UL (ref 4–11)

## 2020-04-14 PROCEDURE — 85027 COMPLETE CBC AUTOMATED: CPT

## 2020-04-14 PROCEDURE — 6370000000 HC RX 637 (ALT 250 FOR IP): Performed by: INTERNAL MEDICINE

## 2020-04-14 PROCEDURE — 2500000003 HC RX 250 WO HCPCS: Performed by: INTERNAL MEDICINE

## 2020-04-14 PROCEDURE — 97165 OT EVAL LOW COMPLEX 30 MIN: CPT

## 2020-04-14 PROCEDURE — 6370000000 HC RX 637 (ALT 250 FOR IP): Performed by: PHYSICIAN ASSISTANT

## 2020-04-14 PROCEDURE — 36415 COLL VENOUS BLD VENIPUNCTURE: CPT

## 2020-04-14 PROCEDURE — 97110 THERAPEUTIC EXERCISES: CPT

## 2020-04-14 PROCEDURE — 2580000003 HC RX 258: Performed by: PHYSICIAN ASSISTANT

## 2020-04-14 PROCEDURE — 84100 ASSAY OF PHOSPHORUS: CPT

## 2020-04-14 PROCEDURE — 2700000000 HC OXYGEN THERAPY PER DAY

## 2020-04-14 PROCEDURE — 6360000002 HC RX W HCPCS: Performed by: PHYSICIAN ASSISTANT

## 2020-04-14 PROCEDURE — 97161 PT EVAL LOW COMPLEX 20 MIN: CPT

## 2020-04-14 PROCEDURE — 1200000002 HC SEMI PRIVATE SWING BED

## 2020-04-14 PROCEDURE — 80053 COMPREHEN METABOLIC PANEL: CPT

## 2020-04-14 PROCEDURE — 97530 THERAPEUTIC ACTIVITIES: CPT

## 2020-04-14 PROCEDURE — 99238 HOSP IP/OBS DSCHRG MGMT 30/<: CPT | Performed by: INTERNAL MEDICINE

## 2020-04-14 RX ORDER — ONDANSETRON 2 MG/ML
4 INJECTION INTRAMUSCULAR; INTRAVENOUS EVERY 6 HOURS PRN
Status: DISCONTINUED | OUTPATIENT
Start: 2020-04-14 | End: 2020-04-27 | Stop reason: HOSPADM

## 2020-04-14 RX ORDER — ZINC SULFATE 50(220)MG
220 CAPSULE ORAL 2 TIMES DAILY
Status: DISCONTINUED | OUTPATIENT
Start: 2020-04-14 | End: 2020-04-14 | Stop reason: ALTCHOICE

## 2020-04-14 RX ORDER — NEBIVOLOL 5 MG/1
5 TABLET ORAL DAILY
Status: DISCONTINUED | OUTPATIENT
Start: 2020-04-15 | End: 2020-04-27 | Stop reason: HOSPADM

## 2020-04-14 RX ORDER — ECHINACEA PURPUREA EXTRACT 125 MG
1 TABLET ORAL 2 TIMES DAILY
Status: DISCONTINUED | OUTPATIENT
Start: 2020-04-14 | End: 2020-04-27 | Stop reason: HOSPADM

## 2020-04-14 RX ORDER — NICOTINE POLACRILEX 4 MG
15 LOZENGE BUCCAL PRN
Status: CANCELLED | OUTPATIENT
Start: 2020-04-14

## 2020-04-14 RX ORDER — AMIODARONE HYDROCHLORIDE 200 MG/1
100 TABLET ORAL
Status: DISCONTINUED | OUTPATIENT
Start: 2020-04-15 | End: 2020-04-27 | Stop reason: HOSPADM

## 2020-04-14 RX ORDER — METOLAZONE 5 MG/1
5 TABLET ORAL ONCE
Status: DISCONTINUED | OUTPATIENT
Start: 2020-04-14 | End: 2020-04-14 | Stop reason: HOSPADM

## 2020-04-14 RX ORDER — ASPIRIN 81 MG/1
81 TABLET ORAL DAILY
Status: CANCELLED | OUTPATIENT
Start: 2020-04-15

## 2020-04-14 RX ORDER — NICOTINE POLACRILEX 4 MG
15 LOZENGE BUCCAL PRN
Status: DISCONTINUED | OUTPATIENT
Start: 2020-04-14 | End: 2020-04-27 | Stop reason: HOSPADM

## 2020-04-14 RX ORDER — ISOSORBIDE MONONITRATE 30 MG/1
30 TABLET, EXTENDED RELEASE ORAL DAILY
Status: DISCONTINUED | OUTPATIENT
Start: 2020-04-15 | End: 2020-04-27 | Stop reason: HOSPADM

## 2020-04-14 RX ORDER — ECHINACEA PURPUREA EXTRACT 125 MG
1 TABLET ORAL 2 TIMES DAILY
Status: CANCELLED | OUTPATIENT
Start: 2020-04-14

## 2020-04-14 RX ORDER — M-VIT,TX,IRON,MINS/CALC/FOLIC 27MG-0.4MG
1 TABLET ORAL DAILY
Status: DISCONTINUED | OUTPATIENT
Start: 2020-04-15 | End: 2020-04-27 | Stop reason: HOSPADM

## 2020-04-14 RX ORDER — DEXTROSE MONOHYDRATE 50 MG/ML
100 INJECTION, SOLUTION INTRAVENOUS PRN
Status: CANCELLED | OUTPATIENT
Start: 2020-04-14

## 2020-04-14 RX ORDER — ISOSORBIDE MONONITRATE 30 MG/1
30 TABLET, EXTENDED RELEASE ORAL DAILY
Status: CANCELLED | OUTPATIENT
Start: 2020-04-15

## 2020-04-14 RX ORDER — PANTOPRAZOLE SODIUM 40 MG/1
40 TABLET, DELAYED RELEASE ORAL DAILY
Status: CANCELLED | OUTPATIENT
Start: 2020-04-15

## 2020-04-14 RX ORDER — LACTOBACILLUS RHAMNOSUS GG 10B CELL
1 CAPSULE ORAL 2 TIMES DAILY WITH MEALS
Status: CANCELLED | OUTPATIENT
Start: 2020-04-14

## 2020-04-14 RX ORDER — BUMETANIDE 0.25 MG/ML
2 INJECTION, SOLUTION INTRAMUSCULAR; INTRAVENOUS EVERY 6 HOURS
Status: DISCONTINUED | OUTPATIENT
Start: 2020-04-14 | End: 2020-04-14 | Stop reason: HOSPADM

## 2020-04-14 RX ORDER — POLYETHYLENE GLYCOL 3350 17 G/17G
17 POWDER, FOR SOLUTION ORAL DAILY PRN
Status: DISCONTINUED | OUTPATIENT
Start: 2020-04-14 | End: 2020-04-15 | Stop reason: SDUPTHER

## 2020-04-14 RX ORDER — DOCUSATE SODIUM 100 MG/1
100 CAPSULE, LIQUID FILLED ORAL 2 TIMES DAILY
Status: CANCELLED | OUTPATIENT
Start: 2020-04-14

## 2020-04-14 RX ORDER — ACETAMINOPHEN 325 MG/1
650 TABLET ORAL EVERY 6 HOURS PRN
Status: CANCELLED | OUTPATIENT
Start: 2020-04-14

## 2020-04-14 RX ORDER — ASCORBIC ACID 500 MG
500 TABLET ORAL DAILY
Status: DISCONTINUED | OUTPATIENT
Start: 2020-04-15 | End: 2020-04-27 | Stop reason: HOSPADM

## 2020-04-14 RX ORDER — POLYETHYLENE GLYCOL 3350 17 G/17G
17 POWDER, FOR SOLUTION ORAL DAILY PRN
Status: DISCONTINUED | OUTPATIENT
Start: 2020-04-14 | End: 2020-04-27 | Stop reason: HOSPADM

## 2020-04-14 RX ORDER — BUMETANIDE 0.25 MG/ML
2 INJECTION, SOLUTION INTRAMUSCULAR; INTRAVENOUS EVERY 6 HOURS
Status: COMPLETED | OUTPATIENT
Start: 2020-04-14 | End: 2020-04-15

## 2020-04-14 RX ORDER — PANTOPRAZOLE SODIUM 40 MG/1
40 TABLET, DELAYED RELEASE ORAL DAILY
Status: DISCONTINUED | OUTPATIENT
Start: 2020-04-15 | End: 2020-04-27 | Stop reason: HOSPADM

## 2020-04-14 RX ORDER — ACETAMINOPHEN 325 MG/1
650 TABLET ORAL EVERY 6 HOURS PRN
Status: DISCONTINUED | OUTPATIENT
Start: 2020-04-14 | End: 2020-04-27 | Stop reason: HOSPADM

## 2020-04-14 RX ORDER — ACETAMINOPHEN 325 MG/1
650 TABLET ORAL EVERY 4 HOURS PRN
Status: DISCONTINUED | OUTPATIENT
Start: 2020-04-14 | End: 2020-04-15 | Stop reason: ALTCHOICE

## 2020-04-14 RX ORDER — ONDANSETRON 2 MG/ML
4 INJECTION INTRAMUSCULAR; INTRAVENOUS EVERY 6 HOURS PRN
Status: CANCELLED | OUTPATIENT
Start: 2020-04-14

## 2020-04-14 RX ORDER — DEXTROSE MONOHYDRATE 50 MG/ML
100 INJECTION, SOLUTION INTRAVENOUS PRN
Status: DISCONTINUED | OUTPATIENT
Start: 2020-04-14 | End: 2020-04-27 | Stop reason: HOSPADM

## 2020-04-14 RX ORDER — POLYETHYLENE GLYCOL 3350 17 G/17G
17 POWDER, FOR SOLUTION ORAL DAILY PRN
Status: CANCELLED | OUTPATIENT
Start: 2020-04-14

## 2020-04-14 RX ORDER — FAMOTIDINE 20 MG/1
20 TABLET, FILM COATED ORAL 2 TIMES DAILY
Status: DISCONTINUED | OUTPATIENT
Start: 2020-04-14 | End: 2020-04-14 | Stop reason: ALTCHOICE

## 2020-04-14 RX ORDER — INSULIN GLARGINE 100 [IU]/ML
12 INJECTION, SOLUTION SUBCUTANEOUS NIGHTLY
Status: CANCELLED | OUTPATIENT
Start: 2020-04-14

## 2020-04-14 RX ORDER — FAMOTIDINE 20 MG/1
20 TABLET, FILM COATED ORAL 2 TIMES DAILY
Status: CANCELLED | OUTPATIENT
Start: 2020-04-14

## 2020-04-14 RX ORDER — ACETAMINOPHEN 650 MG/1
650 SUPPOSITORY RECTAL EVERY 6 HOURS PRN
Status: CANCELLED | OUTPATIENT
Start: 2020-04-14

## 2020-04-14 RX ORDER — ASCORBIC ACID 500 MG
500 TABLET ORAL DAILY
Status: CANCELLED | OUTPATIENT
Start: 2020-04-15

## 2020-04-14 RX ORDER — FERROUS SULFATE TAB EC 324 MG (65 MG FE EQUIVALENT) 324 (65 FE) MG
324 TABLET DELAYED RESPONSE ORAL 2 TIMES DAILY WITH MEALS
Status: CANCELLED | OUTPATIENT
Start: 2020-04-14

## 2020-04-14 RX ORDER — ACETAMINOPHEN 650 MG/1
650 SUPPOSITORY RECTAL EVERY 6 HOURS PRN
Status: DISCONTINUED | OUTPATIENT
Start: 2020-04-14 | End: 2020-04-27 | Stop reason: HOSPADM

## 2020-04-14 RX ORDER — ARGININE 500 MG
500 TABLET ORAL DAILY
Status: CANCELLED | OUTPATIENT
Start: 2020-04-14

## 2020-04-14 RX ORDER — ASPIRIN 81 MG/1
81 TABLET ORAL DAILY
Status: DISCONTINUED | OUTPATIENT
Start: 2020-04-15 | End: 2020-04-27 | Stop reason: HOSPADM

## 2020-04-14 RX ORDER — PROMETHAZINE HYDROCHLORIDE 25 MG/1
12.5 TABLET ORAL EVERY 6 HOURS PRN
Status: DISCONTINUED | OUTPATIENT
Start: 2020-04-14 | End: 2020-04-27 | Stop reason: HOSPADM

## 2020-04-14 RX ORDER — PROMETHAZINE HYDROCHLORIDE 25 MG/1
12.5 TABLET ORAL EVERY 6 HOURS PRN
Status: CANCELLED | OUTPATIENT
Start: 2020-04-14

## 2020-04-14 RX ORDER — FERROUS SULFATE TAB EC 324 MG (65 MG FE EQUIVALENT) 324 (65 FE) MG
324 TABLET DELAYED RESPONSE ORAL 2 TIMES DAILY WITH MEALS
Status: DISCONTINUED | OUTPATIENT
Start: 2020-04-14 | End: 2020-04-27 | Stop reason: HOSPADM

## 2020-04-14 RX ORDER — ARGININE 500 MG
500 TABLET ORAL DAILY
Status: DISCONTINUED | OUTPATIENT
Start: 2020-04-15 | End: 2020-04-27 | Stop reason: HOSPADM

## 2020-04-14 RX ORDER — DOCUSATE SODIUM 100 MG/1
100 CAPSULE, LIQUID FILLED ORAL 2 TIMES DAILY
Status: DISCONTINUED | OUTPATIENT
Start: 2020-04-14 | End: 2020-04-27 | Stop reason: HOSPADM

## 2020-04-14 RX ORDER — AMIODARONE HYDROCHLORIDE 200 MG/1
100 TABLET ORAL
Status: CANCELLED | OUTPATIENT
Start: 2020-04-15

## 2020-04-14 RX ORDER — ATORVASTATIN CALCIUM 10 MG/1
10 TABLET, FILM COATED ORAL DAILY
Status: CANCELLED | OUTPATIENT
Start: 2020-04-15

## 2020-04-14 RX ORDER — DEXTROSE MONOHYDRATE 25 G/50ML
12.5 INJECTION, SOLUTION INTRAVENOUS PRN
Status: CANCELLED | OUTPATIENT
Start: 2020-04-14

## 2020-04-14 RX ORDER — BUMETANIDE 0.25 MG/ML
2 INJECTION, SOLUTION INTRAMUSCULAR; INTRAVENOUS EVERY 8 HOURS
Status: DISCONTINUED | OUTPATIENT
Start: 2020-04-14 | End: 2020-04-14

## 2020-04-14 RX ORDER — M-VIT,TX,IRON,MINS/CALC/FOLIC 27MG-0.4MG
1 TABLET ORAL DAILY
Status: CANCELLED | OUTPATIENT
Start: 2020-04-15

## 2020-04-14 RX ORDER — NEBIVOLOL 5 MG/1
2.5 TABLET ORAL DAILY
Status: CANCELLED | OUTPATIENT
Start: 2020-04-15

## 2020-04-14 RX ORDER — METOLAZONE 5 MG/1
5 TABLET ORAL ONCE
Status: COMPLETED | OUTPATIENT
Start: 2020-04-14 | End: 2020-04-14

## 2020-04-14 RX ORDER — LACTOBACILLUS RHAMNOSUS GG 10B CELL
1 CAPSULE ORAL 2 TIMES DAILY WITH MEALS
Status: DISCONTINUED | OUTPATIENT
Start: 2020-04-14 | End: 2020-04-27 | Stop reason: HOSPADM

## 2020-04-14 RX ORDER — ACETAMINOPHEN 325 MG/1
650 TABLET ORAL EVERY 4 HOURS PRN
Status: CANCELLED | OUTPATIENT
Start: 2020-04-14

## 2020-04-14 RX ORDER — ATORVASTATIN CALCIUM 10 MG/1
10 TABLET, FILM COATED ORAL DAILY
Status: DISCONTINUED | OUTPATIENT
Start: 2020-04-15 | End: 2020-04-27 | Stop reason: HOSPADM

## 2020-04-14 RX ORDER — ZINC SULFATE 50(220)MG
50 CAPSULE ORAL 2 TIMES DAILY
Status: DISCONTINUED | OUTPATIENT
Start: 2020-04-14 | End: 2020-04-27 | Stop reason: HOSPADM

## 2020-04-14 RX ORDER — INSULIN GLARGINE 100 [IU]/ML
12 INJECTION, SOLUTION SUBCUTANEOUS NIGHTLY
Status: DISCONTINUED | OUTPATIENT
Start: 2020-04-14 | End: 2020-04-27 | Stop reason: HOSPADM

## 2020-04-14 RX ORDER — DEXTROSE MONOHYDRATE 25 G/50ML
12.5 INJECTION, SOLUTION INTRAVENOUS PRN
Status: DISCONTINUED | OUTPATIENT
Start: 2020-04-14 | End: 2020-04-27 | Stop reason: HOSPADM

## 2020-04-14 RX ORDER — ZINC SULFATE 50(220)MG
220 CAPSULE ORAL 2 TIMES DAILY
Status: CANCELLED | OUTPATIENT
Start: 2020-04-14

## 2020-04-14 RX ADMIN — APIXABAN 5 MG: 5 TABLET, FILM COATED ORAL at 20:52

## 2020-04-14 RX ADMIN — DOCUSATE SODIUM 100 MG: 100 CAPSULE, LIQUID FILLED ORAL at 20:52

## 2020-04-14 RX ADMIN — APIXABAN 5 MG: 5 TABLET, FILM COATED ORAL at 08:08

## 2020-04-14 RX ADMIN — SALINE NASAL SPRAY 1 SPRAY: 1.5 SOLUTION NASAL at 08:08

## 2020-04-14 RX ADMIN — Medication 1 CAPSULE: at 17:30

## 2020-04-14 RX ADMIN — PANTOPRAZOLE SODIUM 40 MG: 40 TABLET, DELAYED RELEASE ORAL at 08:08

## 2020-04-14 RX ADMIN — SILVER SULFADIAZINE: 10 CREAM TOPICAL at 08:13

## 2020-04-14 RX ADMIN — Medication 1 CAPSULE: at 08:08

## 2020-04-14 RX ADMIN — BUMETANIDE 2 MG: 0.25 INJECTION INTRAMUSCULAR; INTRAVENOUS at 11:35

## 2020-04-14 RX ADMIN — ASPIRIN 81 MG: 81 TABLET, COATED ORAL at 08:09

## 2020-04-14 RX ADMIN — METOLAZONE 5 MG: 5 TABLET ORAL at 11:35

## 2020-04-14 RX ADMIN — DOCUSATE SODIUM 100 MG: 100 CAPSULE, LIQUID FILLED ORAL at 08:09

## 2020-04-14 RX ADMIN — ATORVASTATIN CALCIUM 10 MG: 10 TABLET, FILM COATED ORAL at 08:09

## 2020-04-14 RX ADMIN — ACETAMINOPHEN 650 MG: 325 TABLET, FILM COATED ORAL at 03:12

## 2020-04-14 RX ADMIN — ZINC SULFATE 220 MG (50 MG) CAPSULE 50 MG: CAPSULE at 20:52

## 2020-04-14 RX ADMIN — ISOSORBIDE MONONITRATE 30 MG: 30 TABLET, EXTENDED RELEASE ORAL at 08:08

## 2020-04-14 RX ADMIN — LEVOTHYROXINE SODIUM 175 MCG: 125 TABLET ORAL at 05:08

## 2020-04-14 RX ADMIN — BUMETANIDE 2 MG: 0.25 INJECTION INTRAMUSCULAR; INTRAVENOUS at 00:56

## 2020-04-14 RX ADMIN — OXYCODONE HYDROCHLORIDE AND ACETAMINOPHEN 500 MG: 500 TABLET ORAL at 08:08

## 2020-04-14 RX ADMIN — BUMETANIDE 2 MG: 0.25 INJECTION INTRAMUSCULAR; INTRAVENOUS at 17:30

## 2020-04-14 RX ADMIN — MEROPENEM 1 G: 1 INJECTION, POWDER, FOR SOLUTION INTRAVENOUS at 05:08

## 2020-04-14 RX ADMIN — FERROUS SULFATE TAB EC 324 MG (65 MG FE EQUIVALENT) 324 MG: 324 (65 FE) TABLET DELAYED RESPONSE at 08:09

## 2020-04-14 RX ADMIN — NEBIVOLOL HYDROCHLORIDE 2.5 MG: 5 TABLET ORAL at 08:08

## 2020-04-14 RX ADMIN — INSULIN GLARGINE 12 UNITS: 100 INJECTION, SOLUTION SUBCUTANEOUS at 20:59

## 2020-04-14 RX ADMIN — MEROPENEM 1 G: 1 INJECTION INTRAVENOUS at 17:30

## 2020-04-14 RX ADMIN — FERROUS SULFATE TAB EC 324 MG (65 MG FE EQUIVALENT) 324 MG: 324 (65 FE) TABLET DELAYED RESPONSE at 17:30

## 2020-04-14 RX ADMIN — ZINC SULFATE 220 MG (50 MG) CAPSULE 50 MG: CAPSULE at 11:35

## 2020-04-14 RX ADMIN — SALINE NASAL SPRAY 1 SPRAY: 1.5 SOLUTION NASAL at 20:53

## 2020-04-14 RX ADMIN — MULTIPLE VITAMINS W/ MINERALS TAB 1 TABLET: TAB at 08:09

## 2020-04-14 RX ADMIN — INSULIN LISPRO 1 UNITS: 100 INJECTION, SOLUTION INTRAVENOUS; SUBCUTANEOUS at 20:58

## 2020-04-14 ASSESSMENT — PAIN SCALES - GENERAL: PAINLEVEL_OUTOF10: 6

## 2020-04-14 NOTE — DISCHARGE SUMMARY
Discharge Summary      Patient ID: Felipe Florez      Patient's PCP: JOSE Gibbons    Admit Date: 4/11/2020     Discharge Date: 4/14/2020      Admitting Provider: Elizabeth Loaiza MD    Discharging Provider: KAREN Linares     Reason for this admission:   Edema, diffuse anasarca    Discharge Diagnoses: Active Hospital Problems    Diagnosis Date Noted    Anasarca [R60.1] 04/13/2020    Chronic kidney disease (CKD), stage III (moderate) ([facility] [address] [N18.3] 04/13/2020    UTI (urinary tract infection) [N39.0] 04/13/2020    Acute on chronic heart failure (HCC) [I50.9] 04/13/2020    Atrial fibrillation ([facility] [address] [I48.91] 09/06/2019    Morbid obesity ([facility] [address] [E66.01] 02/05/2019    Hyperkalemia [E87.5] 01/09/2019    Anemia [D64.9] 10/02/2018    Type 2 diabetes mellitus with complication (HCC) [K07.0]     Hypothyroidism [E03.9]        Procedures:  XR CHEST PORTABLE   Final Result   1. Mild increased hazy opacity suspected in the mid to lower lungs    suggestive of edema and/or infiltrate. 2.  Prominence of the cardiac silhouette. 3.  Mild blunting of the left costophrenic angle suggestive of a small    pleural effusion versus pleural thickening. Consults:   IP CONSULT TO DIETITIAN  IP CONSULT TO CASE MANAGEMENT  PT OT    Briefly:   Ms. Stephanie Callahan is a 64-year-old female with extensive past medical history admitted for diffuse anasarca secondary to acute on chronic heart failure exacerbation in the setting of chronic kidney disease stage III. She was diuresed with Bumex drip then transitioned to IV Bumex and p.o. metolazone. Thus far, she is -7.5 L with diuresis but continues to have evidence of volume overload. She was also found to have a UTI with Klebsiella pneumoniae and ESBL E. coli, both sensitive to [address]. She will be discharged to swing bed for ongoing diuresis, IV antibiotics and PT OT. Hospital Course:       Active Hospital Problems    Diagnosis Date Noted    Anasarca [R60.1]  -Suspect diabetic diet, 2000 ml fluid restriction    Labs: For convenience and continuity at follow-up the following most recent labs are provided:    CBC:   Lab Results   Component Value Date    WBC 5.3 04/14/2020    HGB 8.9 04/14/2020    HCT 31.6 04/14/2020     04/14/2020       RENAL:   Lab Results   Component Value Date     04/14/2020    K 4.7 04/14/2020    K 5.4 04/11/2020     04/14/2020    CO2 27 04/14/2020    BUN 54 04/14/2020    CREATININE 3.0 04/14/2020         Discharge Medications:     Discharge Medication List as of 4/14/2020  9:41 AM           Details   NOVOLOG 100 UNIT/ML injection vial USE SUBCUTANEOUS 3 TIMES A DAY BEFORE MEALS LOW DOSE SLIDING SCALE ENCLOSED, Disp-10 mL, R-2Normal      amiodarone (PACERONE) 100 MG tablet Take 100mg by mouth every two days, Disp-30 tablet, R-5Normal      pantoprazole (PROTONIX) 40 MG tablet Take 1 tablet by mouth daily, Disp-30 tablet, R-5Normal      !! vitamin C (ASCORBIC ACID) 500 MG tablet Take 1 tablet by mouth daily, Disp-30 tablet, R-5Normal      !! simvastatin (ZOCOR) 20 MG tablet take 1 tablet by mouth at bedtime, Disp-30 tablet, R-5Normal      furosemide (LASIX) 20 MG tablet Take 1 tablet by mouth daily as needed (swelling), Disp-5 tablet, R-0Normal      albuterol sulfate HFA (PROAIR HFA) 108 (90 Base) MCG/ACT inhaler Inhale 2 puffs into the lungs every 4 hours as needed for Wheezing, Disp-1 Inhaler, R-2Normal      Insulin Syringe-Needle U-100 30G X 5/16\" 1 ML MISC DAILY Starting Wed 3/4/2020, Disp-100 each, R-3, Normal      loperamide (IMODIUM) 2 MG capsule Take 1 capsule by mouth 4 times daily as needed for Diarrhea, Disp-30 capsule, R-5Normal      meclizine (ANTIVERT) 25 MG tablet take 1 tablet by mouth three times a day if needed for dizziness OR MOTION SICKNESS, Disp-30 tablet, R-5Normal      LANTUS 100 UNIT/ML injection vial DAWHistorical Med      bumetanide (BUMEX) 1 MG tablet One tablet by mouth daily.  Take 2  for weight pain of 3 pounds or

## 2020-04-14 NOTE — FLOWSHEET NOTE
04/14/20 0815   Assessment   Charting Type Shift assessment   Neurological   Neuro (WDL) WDL   Level of Consciousness 0   [place] Coma Scale   Eye Opening 4   Best Verbal Response 5   Best Motor Response 6   [place] Coma Scale Score 15   NIH/MNHISS Stroke Scale   NIH/MNIHSS Stroke Scale Assessed No   HEENT   HEENT (WDL) X   Voice Normal   Mucous Membrane Moist   Teeth Edentulous   Respiratory   Respiratory (WDL) X   Respiratory Pattern Regular   Respiratory Depth Normal   Respiratory Quality/Effort Unlabored;Dyspnea with exertion   Chest Assessment Chest expansion symmetrical;Trachea midline   L Breath Sounds Diminished   R Breath Sounds Diminished   Subcutaneous air/Crepitus None   Breath Sounds   Right Upper Lobe Diminished   Right Middle Lobe Diminished   Right Lower Lobe Diminished   Left Upper Lobe Diminished   Left Lower Lobe Diminished   Cough/Sputum   Cough None   Incentive Spirometry Tx   Respiratory Effort Dyspnea with Exertion   Cardiac   Cardiac (WDL) X   Cardiac Regularity Irregular   Heart Sounds S1, S2   Cardiac Monitor   Telemetry Monitor On Yes   Telemetry Audible Yes   Telemetry Alarms Set Yes   Telemetry Box Number [id number]   Telemetry Monitor Alarm Parameters    Gastrointestinal   Abdominal (WDL) X   Abdomen Inspection Distended;Rounded;Rotund   RUQ Bowel Sounds Active   LUQ Bowel Sounds Active   RLQ Bowel Sounds Active   LLQ Bowel Sounds Active   Peripheral Vascular   Peripheral Vascular (WDL) X   Edema Generalized;Right lower extremity; Left lower extremity   Edema Generalized +3;Pitting   RLE Edema +3;Pitting   LLE Edema +3;Pitting   RUE Neurovascular Assessment   Capillary Refill Less than/equal to 3 seconds   Color Pale   Temperature Warm   LUE Neurovascular Assessment   Capillary Refill Less than/equal to 3 seconds   Color Pale   Temperature Warm   RLE Neurovascular Assessment   Capillary Refill Less than/equal to 3 seconds   Color Red   Temperature Warm   Sensation RLE Full sensation
3 seconds   Color Red   Temperature Warm   Sensation RLE Full sensation   LLE Neurovascular Assessment   Capillary Refill Less than/equal to 3 seconds   Color Red   Temperature Warm   Sensation LLE Full sensation   Skin Color/Condition   Skin Color/Condition (WDL) X   Skin Color Pale   Skin Condition/Temp Swollen   Skin Integrity   Skin Integrity (WDL) X   Skin Integrity Redness   Location BLE   Preventative Dressing No   Multiple Skin Integrity Sites No   Dressing Site Abdominal pannus   Treatment Pharmaceutical   Assessed this shift Red;Moist   Musculoskeletal   Musculoskeletal (WDL) X   RUE Full movement   LUE Full movement   RL Extremity Limited movement;Weakness   LL Extremity Limited movement;Weakness   Urethral Catheter Non-latex 16 fr   Placement Date/Time: 04/11/20 0453   Inserted by:  Judi Oakes RN  Catheter Type: Non-latex  Tube Size (fr): 16 fr  Catheter Balloon Size: 10 mL  Collection Container: Standard  Securement Method: Securing device (Describe)  Urine Returned: Yes   Catheter Indications Need for fluid management in critically ill patients in a critical care setting not able to be managed by other means such as BSC with hat, bedpan, urinal, condom catheter, or short term intermittent urethral catherization   Site Assessment Urethral drainage   Urine Color Yellow   Urine Appearance Cloudy   Urine Odor Malodorous   Psychosocial   Psychosocial (WDL) WDL     Patient awake alert   No acute distress  Edema and redness to BLE noted   Pharmaceutical product applied   Patient requests snack at this time   [place] box given

## 2020-04-15 LAB
A/G RATIO: 1.2 (ref 0.8–2)
ALBUMIN SERPL-MCNC: 3.7 G/DL (ref 3.4–4.8)
ALP BLD-CCNC: 175 U/L (ref 25–100)
ALT SERPL-CCNC: 8 U/L (ref 4–36)
ANION GAP SERPL CALCULATED.3IONS-SCNC: 12 MMOL/L (ref 3–16)
AST SERPL-CCNC: 14 U/L (ref 8–33)
BILIRUB SERPL-MCNC: 0.4 MG/DL (ref 0.3–1.2)
BUN BLDV-MCNC: 56 MG/DL (ref 6–20)
CALCIUM SERPL-MCNC: 8.9 MG/DL (ref 8.5–10.5)
CHLORIDE BLD-SCNC: 102 MMOL/L (ref 98–107)
CO2: 27 MMOL/L (ref 20–30)
CREAT SERPL-MCNC: 3 MG/DL (ref 0.4–1.2)
GFR AFRICAN AMERICAN: 19
GFR NON-AFRICAN AMERICAN: 16
GLOBULIN: 3.1 G/DL
GLUCOSE BLD-MCNC: 114 MG/DL (ref 74–106)
GLUCOSE BLD-MCNC: 132 MG/DL (ref 74–106)
GLUCOSE BLD-MCNC: 150 MG/DL (ref 74–106)
GLUCOSE BLD-MCNC: 151 MG/DL (ref 74–106)
GLUCOSE BLD-MCNC: 191 MG/DL (ref 74–106)
HCT VFR BLD CALC: 32.8 % (ref 37–47)
HEMOGLOBIN: 9.2 G/DL (ref 11.5–16.5)
MCH RBC QN AUTO: 24.3 PG (ref 27–32)
MCHC RBC AUTO-ENTMCNC: 28 G/DL (ref 31–35)
MCV RBC AUTO: 86.8 FL (ref 80–100)
PDW BLD-RTO: 19.1 % (ref 11–16)
PERFORMED ON: ABNORMAL
PLATELET # BLD: 134 K/UL (ref 150–400)
PMV BLD AUTO: 9.7 FL (ref 6–10)
POTASSIUM REFLEX MAGNESIUM: 4.8 MMOL/L (ref 3.4–5.1)
RBC # BLD: 3.78 M/UL (ref 3.8–5.8)
SODIUM BLD-SCNC: 141 MMOL/L (ref 136–145)
TOTAL PROTEIN: 6.8 G/DL (ref 6.4–8.3)
WBC # BLD: 5.2 K/UL (ref 4–11)

## 2020-04-15 PROCEDURE — 97110 THERAPEUTIC EXERCISES: CPT

## 2020-04-15 PROCEDURE — 80053 COMPREHEN METABOLIC PANEL: CPT

## 2020-04-15 PROCEDURE — 97530 THERAPEUTIC ACTIVITIES: CPT

## 2020-04-15 PROCEDURE — 97802 MEDICAL NUTRITION INDIV IN: CPT

## 2020-04-15 PROCEDURE — 6360000002 HC RX W HCPCS: Performed by: PHYSICIAN ASSISTANT

## 2020-04-15 PROCEDURE — 2500000003 HC RX 250 WO HCPCS: Performed by: INTERNAL MEDICINE

## 2020-04-15 PROCEDURE — 6370000000 HC RX 637 (ALT 250 FOR IP): Performed by: PHYSICIAN ASSISTANT

## 2020-04-15 PROCEDURE — 2580000003 HC RX 258: Performed by: PHYSICIAN ASSISTANT

## 2020-04-15 PROCEDURE — 85027 COMPLETE CBC AUTOMATED: CPT

## 2020-04-15 PROCEDURE — 99305 1ST NF CARE MODERATE MDM 35: CPT | Performed by: INTERNAL MEDICINE

## 2020-04-15 PROCEDURE — 2700000000 HC OXYGEN THERAPY PER DAY

## 2020-04-15 PROCEDURE — 6370000000 HC RX 637 (ALT 250 FOR IP): Performed by: INTERNAL MEDICINE

## 2020-04-15 PROCEDURE — 36415 COLL VENOUS BLD VENIPUNCTURE: CPT

## 2020-04-15 PROCEDURE — 2500000003 HC RX 250 WO HCPCS: Performed by: PHYSICIAN ASSISTANT

## 2020-04-15 PROCEDURE — 1200000002 HC SEMI PRIVATE SWING BED

## 2020-04-15 RX ORDER — BUMETANIDE 0.25 MG/ML
2 INJECTION, SOLUTION INTRAMUSCULAR; INTRAVENOUS EVERY 6 HOURS
Status: COMPLETED | OUTPATIENT
Start: 2020-04-15 | End: 2020-04-16

## 2020-04-15 RX ORDER — METOLAZONE 5 MG/1
5 TABLET ORAL ONCE
Status: COMPLETED | OUTPATIENT
Start: 2020-04-15 | End: 2020-04-15

## 2020-04-15 RX ADMIN — MEROPENEM 1 G: 1 INJECTION INTRAVENOUS at 17:37

## 2020-04-15 RX ADMIN — SALINE NASAL SPRAY 1 SPRAY: 1.5 SOLUTION NASAL at 08:19

## 2020-04-15 RX ADMIN — DOCUSATE SODIUM 100 MG: 100 CAPSULE, LIQUID FILLED ORAL at 19:59

## 2020-04-15 RX ADMIN — ISOSORBIDE MONONITRATE 30 MG: 30 TABLET, EXTENDED RELEASE ORAL at 08:19

## 2020-04-15 RX ADMIN — BUMETANIDE 2 MG: 0.25 INJECTION INTRAMUSCULAR; INTRAVENOUS at 00:17

## 2020-04-15 RX ADMIN — INSULIN LISPRO 1 UNITS: 100 INJECTION, SOLUTION INTRAVENOUS; SUBCUTANEOUS at 20:01

## 2020-04-15 RX ADMIN — ZINC SULFATE 220 MG (50 MG) CAPSULE 50 MG: CAPSULE at 08:18

## 2020-04-15 RX ADMIN — ACETAMINOPHEN 650 MG: 325 TABLET, FILM COATED ORAL at 08:18

## 2020-04-15 RX ADMIN — ASPIRIN 81 MG: 81 TABLET, COATED ORAL at 08:19

## 2020-04-15 RX ADMIN — ONDANSETRON HYDROCHLORIDE 4 MG: 2 SOLUTION INTRAMUSCULAR; INTRAVENOUS at 08:19

## 2020-04-15 RX ADMIN — PANTOPRAZOLE SODIUM 40 MG: 40 TABLET, DELAYED RELEASE ORAL at 08:19

## 2020-04-15 RX ADMIN — INSULIN LISPRO 1 UNITS: 100 INJECTION, SOLUTION INTRAVENOUS; SUBCUTANEOUS at 08:20

## 2020-04-15 RX ADMIN — MULTIPLE VITAMINS W/ MINERALS TAB 1 TABLET: TAB at 08:19

## 2020-04-15 RX ADMIN — Medication 1 CAPSULE: at 08:19

## 2020-04-15 RX ADMIN — APIXABAN 5 MG: 5 TABLET, FILM COATED ORAL at 08:18

## 2020-04-15 RX ADMIN — ATORVASTATIN CALCIUM 10 MG: 10 TABLET, FILM COATED ORAL at 08:19

## 2020-04-15 RX ADMIN — MEROPENEM 1 G: 1 INJECTION INTRAVENOUS at 05:19

## 2020-04-15 RX ADMIN — BUMETANIDE 2 MG: 0.25 INJECTION INTRAMUSCULAR; INTRAVENOUS at 11:23

## 2020-04-15 RX ADMIN — APIXABAN 5 MG: 5 TABLET, FILM COATED ORAL at 19:59

## 2020-04-15 RX ADMIN — INSULIN GLARGINE 12 UNITS: 100 INJECTION, SOLUTION SUBCUTANEOUS at 20:01

## 2020-04-15 RX ADMIN — AMIODARONE HYDROCHLORIDE 100 MG: 200 TABLET ORAL at 11:23

## 2020-04-15 RX ADMIN — BUMETANIDE 2 MG: 0.25 INJECTION INTRAMUSCULAR; INTRAVENOUS at 05:19

## 2020-04-15 RX ADMIN — BUMETANIDE 2 MG: 0.25 INJECTION INTRAMUSCULAR; INTRAVENOUS at 23:40

## 2020-04-15 RX ADMIN — SILVER SULFADIAZINE: 10 CREAM TOPICAL at 11:23

## 2020-04-15 RX ADMIN — NEBIVOLOL HYDROCHLORIDE 5 MG: 5 TABLET ORAL at 08:18

## 2020-04-15 RX ADMIN — FERROUS SULFATE TAB EC 324 MG (65 MG FE EQUIVALENT) 324 MG: 324 (65 FE) TABLET DELAYED RESPONSE at 08:19

## 2020-04-15 RX ADMIN — Medication 1 CAPSULE: at 17:37

## 2020-04-15 RX ADMIN — DOCUSATE SODIUM 100 MG: 100 CAPSULE, LIQUID FILLED ORAL at 08:18

## 2020-04-15 RX ADMIN — ZINC SULFATE 220 MG (50 MG) CAPSULE 50 MG: CAPSULE at 19:59

## 2020-04-15 RX ADMIN — BUMETANIDE 2 MG: 0.25 INJECTION INTRAMUSCULAR; INTRAVENOUS at 17:37

## 2020-04-15 RX ADMIN — SALINE NASAL SPRAY 1 SPRAY: 1.5 SOLUTION NASAL at 19:59

## 2020-04-15 RX ADMIN — METOLAZONE 5 MG: 5 TABLET ORAL at 11:23

## 2020-04-15 RX ADMIN — FERROUS SULFATE TAB EC 324 MG (65 MG FE EQUIVALENT) 324 MG: 324 (65 FE) TABLET DELAYED RESPONSE at 17:37

## 2020-04-15 RX ADMIN — LEVOTHYROXINE SODIUM 175 MCG: 125 TABLET ORAL at 05:19

## 2020-04-15 RX ADMIN — OXYCODONE HYDROCHLORIDE AND ACETAMINOPHEN 500 MG: 500 TABLET ORAL at 08:18

## 2020-04-15 NOTE — H&P
Sulfate (IRON) 325 (65 Fe) MG TABS take 1 tablet by mouth twice a day with food  Patient taking differently: 2 times daily  4/4/19   JOSE Powers   docusate sodium (COLACE) 100 MG capsule Take 100 mg by mouth 2 times daily    Historical Provider, MD   saccharomyces boulardii (FLORASTOR) 250 MG capsule Take 250 mg by mouth 2 times daily    Historical Provider, MD   polyethylene glycol (MIRALAX) powder Take 17 g by mouth 2 times daily    Historical Provider, MD   arginine 500 MG tablet Take 500 mg by mouth daily    Historical Provider, MD   acetaminophen (TYLENOL) 325 MG tablet Take 650 mg by mouth every 6 hours as needed for Pain    Historical Provider, MD   Multiple Vitamins-Minerals (THERAPEUTIC MULTIVITAMIN-MINERALS) tablet Take 1 tablet by mouth daily    Historical Provider, MD   simvastatin (ZOCOR) 20 MG tablet take 1 tablet by mouth at bedtime 11/27/18   JOSE Powers   RA VITAMIN C 500 MG tablet TAKE 1 TABLET BY MOUTH DAILY 11/27/18   JOSE Powers   blood glucose test strips (FREESTYLE TEST STRIPS) strip Daily dx:250.00 7/26/18   JOSE Powers       Allergies:  Metformin and related    Social History:   TOBACCO:   reports that she has never smoked. She has never used smokeless tobacco.  ETOH:   reports no history of alcohol use. OCCUPATION:  None    Family History:       Problem Relation Age of Onset    Asthma Mother     High Blood Pressure Father     Stroke Father        Review of system  Constitutional:  Denies fever or chills. Positive for generalized weakness. Eyes:  Denies eye pain or redness  HENT:  Denies nasal congestion or sore throat   Respiratory:  Denies cough or shortness of breath   Cardiovascular:  Denies chest pain. Diffuse anasarca.    GI:  Denies abdominal pain, nausea, vomiting, bloody stools or diarrhea   :  Denies dysuria or frequency  Musculoskeletal:  Denies acute neck pain or body aches  Integument:  Denies rash or itching  Neurologic:  Denies headache, dizziness, numbness, tingling or unilateral weakness  Psychiatric:  Denies acute depression or acute anxiety      Vital Signs  Temp: 98.1 °F (36.7 °C)  Pulse: 103  Resp: 18  BP: (!) 142/92  SpO2: 97 %  O2 Device: Nasal cannula       vital signs reviewed in electronic chart. Physical exam  Constitutional:  Well developed, well nourished, morbidly obese female in no acute distress.  Diffusely anasarca.  On 2.5 L nasal cannula. Eyes:  PERRL, conjunctiva normal, EOMI. HENT:  Atraumatic, external ears normal, external nose/nares normal, oropharynx moist, no pharyngeal exudates. Neck:  Supple. No JVD or thyromegaly. Respiratory:  No respiratory distress, normal breath sounds, no rales, no wheezing.  On 2.5 L nasal cannula. Cardiovascular: Tachycardic, normal rhythm, no murmurs, no gallops, no rubs. GI:  Soft, nondistended, normal bowel sounds, nontender, no organomegaly, no mass.  Diffuse anasarca with dimpling of the abdominal skin. :  No costovertebral angle tenderness. Musculoskeletal: no tenderness, no obvious deformities. Patient is moving all extremities.  4+ bilateral lower extremity edema extending to mid thighs. Integument:  Well hydrated, no rash. Lymphatic:  No cervical or axillary lymphadenopathy noted. Neurologic:  Alert & oriented x 3,  no focal deficits noted. Strength is equal throughout. Psychiatric:  Speech and behavior appropriate.       Lab Results   Component Value Date     04/15/2020    K 4.8 04/15/2020     04/15/2020    CO2 27 04/15/2020    BUN 56 (H) 04/15/2020    CREATININE 3.0 (H) 04/15/2020    GLUCOSE 150 (H) 04/15/2020    CALCIUM 8.9 04/15/2020    PROT 6.8 04/15/2020    LABALBU 3.7 04/15/2020    BILITOT 0.4 04/15/2020    ALKPHOS 175 (H) 04/15/2020    AST 14 04/15/2020    ALT 8 04/15/2020    LABGLOM 16 (L) 04/15/2020    GFRAA 19 (L) 04/15/2020    AGRATIO 1.2 04/15/2020    GLOB 3.1 04/15/2020           Lab Results   Component Value Date    WBC 5.2

## 2020-04-15 NOTE — CONSULTS
kg)  · Admission Body Wt:    · Usual Body Wt:    · % Weight Change:  ,     · Ideal Body Wt: 130 lb (59 kg), % Ideal Body 286  · Adjusted Body Wt:  , body weight adjusted for    · BMI Classification: BMI > or equal to 40.0 Obese Class III(60.4)    Nutrition Interventions:   Modify current diet, Start ONS  Continued Inpatient Monitoring, Education Completed(Have edu patient and left materials with patient.)    Nutrition Evaluation:   · Evaluation: Goals set   · Goals: to meet est needs while promoting safe weight loss    · Monitoring: Nutrition Progression, Meal Intake, Supplement Intake, Weight, Pertinent Labs      Electronically signed by Steve Yan on 4/15/20 at 12:38 PM EDT

## 2020-04-16 LAB
ANION GAP SERPL CALCULATED.3IONS-SCNC: 13 MMOL/L (ref 3–16)
BUN BLDV-MCNC: 58 MG/DL (ref 6–20)
CALCIUM SERPL-MCNC: 8.7 MG/DL (ref 8.5–10.5)
CHLORIDE BLD-SCNC: 101 MMOL/L (ref 98–107)
CO2: 28 MMOL/L (ref 20–30)
CREAT SERPL-MCNC: 2.9 MG/DL (ref 0.4–1.2)
GFR AFRICAN AMERICAN: 20
GFR NON-AFRICAN AMERICAN: 16
GLUCOSE BLD-MCNC: 105 MG/DL (ref 74–106)
GLUCOSE BLD-MCNC: 141 MG/DL (ref 74–106)
GLUCOSE BLD-MCNC: 150 MG/DL (ref 74–106)
GLUCOSE BLD-MCNC: 183 MG/DL (ref 74–106)
GLUCOSE BLD-MCNC: 99 MG/DL (ref 74–106)
PERFORMED ON: ABNORMAL
PERFORMED ON: NORMAL
POTASSIUM SERPL-SCNC: 4.5 MMOL/L (ref 3.4–5.1)
SODIUM BLD-SCNC: 142 MMOL/L (ref 136–145)

## 2020-04-16 PROCEDURE — 36415 COLL VENOUS BLD VENIPUNCTURE: CPT

## 2020-04-16 PROCEDURE — 6370000000 HC RX 637 (ALT 250 FOR IP): Performed by: INTERNAL MEDICINE

## 2020-04-16 PROCEDURE — 2500000003 HC RX 250 WO HCPCS: Performed by: PHYSICIAN ASSISTANT

## 2020-04-16 PROCEDURE — 1200000002 HC SEMI PRIVATE SWING BED

## 2020-04-16 PROCEDURE — 6360000002 HC RX W HCPCS: Performed by: PHYSICIAN ASSISTANT

## 2020-04-16 PROCEDURE — 2580000003 HC RX 258: Performed by: PHYSICIAN ASSISTANT

## 2020-04-16 PROCEDURE — 6370000000 HC RX 637 (ALT 250 FOR IP): Performed by: PHYSICIAN ASSISTANT

## 2020-04-16 PROCEDURE — 80048 BASIC METABOLIC PNL TOTAL CA: CPT

## 2020-04-16 RX ORDER — BUMETANIDE 0.25 MG/ML
2 INJECTION, SOLUTION INTRAMUSCULAR; INTRAVENOUS EVERY 8 HOURS
Status: DISPENSED | OUTPATIENT
Start: 2020-04-16 | End: 2020-04-19

## 2020-04-16 RX ORDER — BUMETANIDE 0.25 MG/ML
2 INJECTION, SOLUTION INTRAMUSCULAR; INTRAVENOUS EVERY 6 HOURS
Status: DISCONTINUED | OUTPATIENT
Start: 2020-04-16 | End: 2020-04-16

## 2020-04-16 RX ORDER — METOLAZONE 5 MG/1
5 TABLET ORAL DAILY
Status: DISCONTINUED | OUTPATIENT
Start: 2020-04-16 | End: 2020-04-27 | Stop reason: HOSPADM

## 2020-04-16 RX ADMIN — NEBIVOLOL HYDROCHLORIDE 5 MG: 5 TABLET ORAL at 08:43

## 2020-04-16 RX ADMIN — FERROUS SULFATE TAB EC 324 MG (65 MG FE EQUIVALENT) 324 MG: 324 (65 FE) TABLET DELAYED RESPONSE at 18:43

## 2020-04-16 RX ADMIN — BUMETANIDE 2 MG: 0.25 INJECTION INTRAMUSCULAR; INTRAVENOUS at 15:46

## 2020-04-16 RX ADMIN — OXYCODONE HYDROCHLORIDE AND ACETAMINOPHEN 500 MG: 500 TABLET ORAL at 08:43

## 2020-04-16 RX ADMIN — METOLAZONE 5 MG: 5 TABLET ORAL at 09:06

## 2020-04-16 RX ADMIN — ZINC SULFATE 220 MG (50 MG) CAPSULE 50 MG: CAPSULE at 08:43

## 2020-04-16 RX ADMIN — SILVER SULFADIAZINE: 10 CREAM TOPICAL at 08:43

## 2020-04-16 RX ADMIN — FERROUS SULFATE TAB EC 324 MG (65 MG FE EQUIVALENT) 324 MG: 324 (65 FE) TABLET DELAYED RESPONSE at 08:43

## 2020-04-16 RX ADMIN — Medication 1 CAPSULE: at 08:43

## 2020-04-16 RX ADMIN — ACETAMINOPHEN 650 MG: 325 TABLET, FILM COATED ORAL at 02:42

## 2020-04-16 RX ADMIN — BUMETANIDE 2 MG: 0.25 INJECTION INTRAMUSCULAR; INTRAVENOUS at 21:15

## 2020-04-16 RX ADMIN — Medication 1 CAPSULE: at 18:43

## 2020-04-16 RX ADMIN — LEVOTHYROXINE SODIUM 175 MCG: 125 TABLET ORAL at 05:21

## 2020-04-16 RX ADMIN — INSULIN LISPRO 1 UNITS: 100 INJECTION, SOLUTION INTRAVENOUS; SUBCUTANEOUS at 21:21

## 2020-04-16 RX ADMIN — DOCUSATE SODIUM 100 MG: 100 CAPSULE, LIQUID FILLED ORAL at 21:15

## 2020-04-16 RX ADMIN — INSULIN GLARGINE 12 UNITS: 100 INJECTION, SOLUTION SUBCUTANEOUS at 21:21

## 2020-04-16 RX ADMIN — APIXABAN 5 MG: 5 TABLET, FILM COATED ORAL at 21:15

## 2020-04-16 RX ADMIN — ASPIRIN 81 MG: 81 TABLET, COATED ORAL at 08:43

## 2020-04-16 RX ADMIN — SALINE NASAL SPRAY 1 SPRAY: 1.5 SOLUTION NASAL at 08:43

## 2020-04-16 RX ADMIN — ZINC SULFATE 220 MG (50 MG) CAPSULE 50 MG: CAPSULE at 21:15

## 2020-04-16 RX ADMIN — PANTOPRAZOLE SODIUM 40 MG: 40 TABLET, DELAYED RELEASE ORAL at 08:43

## 2020-04-16 RX ADMIN — SALINE NASAL SPRAY 1 SPRAY: 1.5 SOLUTION NASAL at 21:21

## 2020-04-16 RX ADMIN — MEROPENEM 1 G: 1 INJECTION INTRAVENOUS at 05:21

## 2020-04-16 RX ADMIN — INSULIN LISPRO 1 UNITS: 100 INJECTION, SOLUTION INTRAVENOUS; SUBCUTANEOUS at 11:28

## 2020-04-16 RX ADMIN — DOCUSATE SODIUM 100 MG: 100 CAPSULE, LIQUID FILLED ORAL at 08:43

## 2020-04-16 RX ADMIN — BUMETANIDE 2 MG: 0.25 INJECTION INTRAMUSCULAR; INTRAVENOUS at 05:21

## 2020-04-16 RX ADMIN — ACETAMINOPHEN 650 MG: 325 TABLET, FILM COATED ORAL at 08:55

## 2020-04-16 RX ADMIN — APIXABAN 5 MG: 5 TABLET, FILM COATED ORAL at 08:43

## 2020-04-16 RX ADMIN — MULTIPLE VITAMINS W/ MINERALS TAB 1 TABLET: TAB at 08:43

## 2020-04-16 RX ADMIN — INSULIN LISPRO 1 UNITS: 100 INJECTION, SOLUTION INTRAVENOUS; SUBCUTANEOUS at 16:57

## 2020-04-16 RX ADMIN — MEROPENEM 1 G: 1 INJECTION INTRAVENOUS at 18:43

## 2020-04-16 RX ADMIN — ATORVASTATIN CALCIUM 10 MG: 10 TABLET, FILM COATED ORAL at 08:43

## 2020-04-16 RX ADMIN — ISOSORBIDE MONONITRATE 30 MG: 30 TABLET, EXTENDED RELEASE ORAL at 08:43

## 2020-04-16 ASSESSMENT — PAIN SCALES - GENERAL
PAINLEVEL_OUTOF10: 4
PAINLEVEL_OUTOF10: 5

## 2020-04-17 LAB
GLUCOSE BLD-MCNC: 172 MG/DL (ref 74–106)
GLUCOSE BLD-MCNC: 175 MG/DL (ref 74–106)
GLUCOSE BLD-MCNC: 184 MG/DL (ref 74–106)
GLUCOSE BLD-MCNC: 317 MG/DL (ref 74–106)
PERFORMED ON: ABNORMAL

## 2020-04-17 PROCEDURE — 6370000000 HC RX 637 (ALT 250 FOR IP): Performed by: PHYSICIAN ASSISTANT

## 2020-04-17 PROCEDURE — 2700000000 HC OXYGEN THERAPY PER DAY

## 2020-04-17 PROCEDURE — 2500000003 HC RX 250 WO HCPCS: Performed by: PHYSICIAN ASSISTANT

## 2020-04-17 PROCEDURE — 6360000002 HC RX W HCPCS: Performed by: PHYSICIAN ASSISTANT

## 2020-04-17 PROCEDURE — 6370000000 HC RX 637 (ALT 250 FOR IP): Performed by: INTERNAL MEDICINE

## 2020-04-17 PROCEDURE — 97110 THERAPEUTIC EXERCISES: CPT

## 2020-04-17 PROCEDURE — 2580000003 HC RX 258: Performed by: PHYSICIAN ASSISTANT

## 2020-04-17 PROCEDURE — 1200000002 HC SEMI PRIVATE SWING BED

## 2020-04-17 RX ORDER — LIDOCAINE 4 G/G
1 PATCH TOPICAL DAILY
Status: DISCONTINUED | OUTPATIENT
Start: 2020-04-17 | End: 2020-04-27 | Stop reason: HOSPADM

## 2020-04-17 RX ADMIN — MEROPENEM 1 G: 1 INJECTION INTRAVENOUS at 16:33

## 2020-04-17 RX ADMIN — NEBIVOLOL HYDROCHLORIDE 5 MG: 5 TABLET ORAL at 08:05

## 2020-04-17 RX ADMIN — INSULIN GLARGINE 12 UNITS: 100 INJECTION, SOLUTION SUBCUTANEOUS at 20:32

## 2020-04-17 RX ADMIN — Medication 1 CAPSULE: at 08:06

## 2020-04-17 RX ADMIN — ATORVASTATIN CALCIUM 10 MG: 10 TABLET, FILM COATED ORAL at 08:06

## 2020-04-17 RX ADMIN — AMIODARONE HYDROCHLORIDE 100 MG: 200 TABLET ORAL at 10:33

## 2020-04-17 RX ADMIN — BUMETANIDE 2 MG: 0.25 INJECTION INTRAMUSCULAR; INTRAVENOUS at 13:38

## 2020-04-17 RX ADMIN — BUMETANIDE 2 MG: 0.25 INJECTION INTRAMUSCULAR; INTRAVENOUS at 05:35

## 2020-04-17 RX ADMIN — SALINE NASAL SPRAY 1 SPRAY: 1.5 SOLUTION NASAL at 08:06

## 2020-04-17 RX ADMIN — MEROPENEM 1 G: 1 INJECTION INTRAVENOUS at 05:35

## 2020-04-17 RX ADMIN — ZINC SULFATE 220 MG (50 MG) CAPSULE 50 MG: CAPSULE at 08:06

## 2020-04-17 RX ADMIN — METOLAZONE 5 MG: 5 TABLET ORAL at 08:05

## 2020-04-17 RX ADMIN — BUMETANIDE 2 MG: 0.25 INJECTION INTRAMUSCULAR; INTRAVENOUS at 22:00

## 2020-04-17 RX ADMIN — APIXABAN 5 MG: 5 TABLET, FILM COATED ORAL at 20:28

## 2020-04-17 RX ADMIN — Medication 1 CAPSULE: at 16:33

## 2020-04-17 RX ADMIN — PANTOPRAZOLE SODIUM 40 MG: 40 TABLET, DELAYED RELEASE ORAL at 08:05

## 2020-04-17 RX ADMIN — DOCUSATE SODIUM 100 MG: 100 CAPSULE, LIQUID FILLED ORAL at 20:28

## 2020-04-17 RX ADMIN — ISOSORBIDE MONONITRATE 30 MG: 30 TABLET, EXTENDED RELEASE ORAL at 08:05

## 2020-04-17 RX ADMIN — INSULIN LISPRO 1 UNITS: 100 INJECTION, SOLUTION INTRAVENOUS; SUBCUTANEOUS at 08:10

## 2020-04-17 RX ADMIN — ZINC SULFATE 220 MG (50 MG) CAPSULE 50 MG: CAPSULE at 20:32

## 2020-04-17 RX ADMIN — INSULIN LISPRO 2 UNITS: 100 INJECTION, SOLUTION INTRAVENOUS; SUBCUTANEOUS at 20:32

## 2020-04-17 RX ADMIN — ASPIRIN 81 MG: 81 TABLET, COATED ORAL at 08:05

## 2020-04-17 RX ADMIN — OXYCODONE HYDROCHLORIDE AND ACETAMINOPHEN 500 MG: 500 TABLET ORAL at 08:06

## 2020-04-17 RX ADMIN — SILVER SULFADIAZINE: 10 CREAM TOPICAL at 08:06

## 2020-04-17 RX ADMIN — MICONAZOLE NITRATE: 20 POWDER TOPICAL at 10:34

## 2020-04-17 RX ADMIN — SALINE NASAL SPRAY 1 SPRAY: 1.5 SOLUTION NASAL at 20:28

## 2020-04-17 RX ADMIN — MULTIPLE VITAMINS W/ MINERALS TAB 1 TABLET: TAB at 08:06

## 2020-04-17 RX ADMIN — APIXABAN 5 MG: 5 TABLET, FILM COATED ORAL at 08:06

## 2020-04-17 RX ADMIN — INSULIN LISPRO 1 UNITS: 100 INJECTION, SOLUTION INTRAVENOUS; SUBCUTANEOUS at 11:37

## 2020-04-17 RX ADMIN — LEVOTHYROXINE SODIUM 175 MCG: 125 TABLET ORAL at 05:35

## 2020-04-17 RX ADMIN — FERROUS SULFATE TAB EC 324 MG (65 MG FE EQUIVALENT) 324 MG: 324 (65 FE) TABLET DELAYED RESPONSE at 16:33

## 2020-04-17 RX ADMIN — MICONAZOLE NITRATE: 20 POWDER TOPICAL at 20:53

## 2020-04-17 RX ADMIN — INSULIN LISPRO 1 UNITS: 100 INJECTION, SOLUTION INTRAVENOUS; SUBCUTANEOUS at 16:36

## 2020-04-17 RX ADMIN — FERROUS SULFATE TAB EC 324 MG (65 MG FE EQUIVALENT) 324 MG: 324 (65 FE) TABLET DELAYED RESPONSE at 08:05

## 2020-04-17 RX ADMIN — DOCUSATE SODIUM 100 MG: 100 CAPSULE, LIQUID FILLED ORAL at 08:06

## 2020-04-17 NOTE — PLAN OF CARE
Problem: Pain:  Goal: Pain level will decrease  Description: Pain level will decrease  Outcome: Ongoing     Problem: Infection:  Goal: Will remain free from infection  Description: Will remain free from infection  Outcome: Ongoing     Problem: Safety:  Goal: Free from accidental physical injury  Description: Free from accidental physical injury  4/17/2020 0320 by Diane Ramos RN  Outcome: Ongoing  Goal: Free from intentional harm  Description: Free from intentional harm  4/17/2020 0320 by Diane Ramos RN  Outcome: Ongoing     Problem: Skin Integrity:  Goal: Skin integrity will stabilize  Description: Skin integrity will stabilize  Outcome: Ongoing

## 2020-04-17 NOTE — CARE COORDINATION
PT: attempt to treat; patient in chair with head covered by blanket; patient declined to uncover her head despite encouragement to do therapy.   Electronically signed by Christina Calderón PT on 4/17/2020 at 11:56 AM

## 2020-04-18 LAB
GLUCOSE BLD-MCNC: 131 MG/DL (ref 74–106)
GLUCOSE BLD-MCNC: 168 MG/DL (ref 74–106)
GLUCOSE BLD-MCNC: 175 MG/DL (ref 74–106)
GLUCOSE BLD-MCNC: 179 MG/DL (ref 74–106)
PERFORMED ON: ABNORMAL

## 2020-04-18 PROCEDURE — 6370000000 HC RX 637 (ALT 250 FOR IP): Performed by: INTERNAL MEDICINE

## 2020-04-18 PROCEDURE — 6360000002 HC RX W HCPCS: Performed by: PHYSICIAN ASSISTANT

## 2020-04-18 PROCEDURE — 1200000002 HC SEMI PRIVATE SWING BED

## 2020-04-18 PROCEDURE — 2580000003 HC RX 258: Performed by: PHYSICIAN ASSISTANT

## 2020-04-18 PROCEDURE — 2500000003 HC RX 250 WO HCPCS: Performed by: PHYSICIAN ASSISTANT

## 2020-04-18 PROCEDURE — 6370000000 HC RX 637 (ALT 250 FOR IP): Performed by: PHYSICIAN ASSISTANT

## 2020-04-18 RX ORDER — MAGNESIUM HYDROXIDE/ALUMINUM HYDROXICE/SIMETHICONE 120; 1200; 1200 MG/30ML; MG/30ML; MG/30ML
30 SUSPENSION ORAL EVERY 6 HOURS PRN
Status: DISCONTINUED | OUTPATIENT
Start: 2020-04-18 | End: 2020-04-27 | Stop reason: HOSPADM

## 2020-04-18 RX ADMIN — ATORVASTATIN CALCIUM 10 MG: 10 TABLET, FILM COATED ORAL at 08:12

## 2020-04-18 RX ADMIN — SILVER SULFADIAZINE: 10 CREAM TOPICAL at 08:12

## 2020-04-18 RX ADMIN — LEVOTHYROXINE SODIUM 175 MCG: 125 TABLET ORAL at 05:03

## 2020-04-18 RX ADMIN — INSULIN GLARGINE 12 UNITS: 100 INJECTION, SOLUTION SUBCUTANEOUS at 20:01

## 2020-04-18 RX ADMIN — APIXABAN 5 MG: 5 TABLET, FILM COATED ORAL at 20:00

## 2020-04-18 RX ADMIN — INSULIN LISPRO 1 UNITS: 100 INJECTION, SOLUTION INTRAVENOUS; SUBCUTANEOUS at 11:40

## 2020-04-18 RX ADMIN — OXYCODONE HYDROCHLORIDE AND ACETAMINOPHEN 500 MG: 500 TABLET ORAL at 08:11

## 2020-04-18 RX ADMIN — MICONAZOLE NITRATE: 20 POWDER TOPICAL at 08:13

## 2020-04-18 RX ADMIN — DOCUSATE SODIUM 100 MG: 100 CAPSULE, LIQUID FILLED ORAL at 08:11

## 2020-04-18 RX ADMIN — METOLAZONE 5 MG: 5 TABLET ORAL at 08:11

## 2020-04-18 RX ADMIN — INSULIN LISPRO 1 UNITS: 100 INJECTION, SOLUTION INTRAVENOUS; SUBCUTANEOUS at 20:01

## 2020-04-18 RX ADMIN — SALINE NASAL SPRAY 1 SPRAY: 1.5 SOLUTION NASAL at 20:00

## 2020-04-18 RX ADMIN — ISOSORBIDE MONONITRATE 30 MG: 30 TABLET, EXTENDED RELEASE ORAL at 08:12

## 2020-04-18 RX ADMIN — BUMETANIDE 2 MG: 0.25 INJECTION INTRAMUSCULAR; INTRAVENOUS at 05:03

## 2020-04-18 RX ADMIN — Medication 1 CAPSULE: at 08:12

## 2020-04-18 RX ADMIN — Medication 1 CAPSULE: at 16:24

## 2020-04-18 RX ADMIN — BUMETANIDE 2 MG: 0.25 INJECTION INTRAMUSCULAR; INTRAVENOUS at 22:32

## 2020-04-18 RX ADMIN — FERROUS SULFATE TAB EC 324 MG (65 MG FE EQUIVALENT) 324 MG: 324 (65 FE) TABLET DELAYED RESPONSE at 16:24

## 2020-04-18 RX ADMIN — ASPIRIN 81 MG: 81 TABLET, COATED ORAL at 08:11

## 2020-04-18 RX ADMIN — BUMETANIDE 2 MG: 0.25 INJECTION INTRAMUSCULAR; INTRAVENOUS at 13:55

## 2020-04-18 RX ADMIN — MEROPENEM 1 G: 1 INJECTION INTRAVENOUS at 05:03

## 2020-04-18 RX ADMIN — APIXABAN 5 MG: 5 TABLET, FILM COATED ORAL at 08:12

## 2020-04-18 RX ADMIN — MEROPENEM 1 G: 1 INJECTION INTRAVENOUS at 16:24

## 2020-04-18 RX ADMIN — NEBIVOLOL HYDROCHLORIDE 5 MG: 5 TABLET ORAL at 08:12

## 2020-04-18 RX ADMIN — MICONAZOLE NITRATE: 20 POWDER TOPICAL at 20:01

## 2020-04-18 RX ADMIN — FERROUS SULFATE TAB EC 324 MG (65 MG FE EQUIVALENT) 324 MG: 324 (65 FE) TABLET DELAYED RESPONSE at 08:12

## 2020-04-18 RX ADMIN — ZINC SULFATE 220 MG (50 MG) CAPSULE 50 MG: CAPSULE at 20:00

## 2020-04-18 RX ADMIN — INSULIN LISPRO 1 UNITS: 100 INJECTION, SOLUTION INTRAVENOUS; SUBCUTANEOUS at 16:25

## 2020-04-18 RX ADMIN — ALUMINUM HYDROXIDE, MAGNESIUM HYDROXIDE, AND SIMETHICONE 30 ML: 200; 200; 20 SUSPENSION ORAL at 20:00

## 2020-04-18 RX ADMIN — DOCUSATE SODIUM 100 MG: 100 CAPSULE, LIQUID FILLED ORAL at 20:00

## 2020-04-18 RX ADMIN — SALINE NASAL SPRAY 1 SPRAY: 1.5 SOLUTION NASAL at 08:12

## 2020-04-18 RX ADMIN — MULTIPLE VITAMINS W/ MINERALS TAB 1 TABLET: TAB at 08:12

## 2020-04-18 RX ADMIN — PANTOPRAZOLE SODIUM 40 MG: 40 TABLET, DELAYED RELEASE ORAL at 08:12

## 2020-04-18 RX ADMIN — ZINC SULFATE 220 MG (50 MG) CAPSULE 50 MG: CAPSULE at 08:11

## 2020-04-18 ASSESSMENT — PAIN SCALES - GENERAL: PAINLEVEL_OUTOF10: 1

## 2020-04-19 LAB
ANION GAP SERPL CALCULATED.3IONS-SCNC: 12 MMOL/L (ref 3–16)
BUN BLDV-MCNC: 71 MG/DL (ref 6–20)
CALCIUM SERPL-MCNC: 9 MG/DL (ref 8.5–10.5)
CHLORIDE BLD-SCNC: 100 MMOL/L (ref 98–107)
CO2: 30 MMOL/L (ref 20–30)
CREAT SERPL-MCNC: 2.8 MG/DL (ref 0.4–1.2)
GFR AFRICAN AMERICAN: 21
GFR NON-AFRICAN AMERICAN: 17
GLUCOSE BLD-MCNC: 113 MG/DL (ref 74–106)
GLUCOSE BLD-MCNC: 116 MG/DL (ref 74–106)
GLUCOSE BLD-MCNC: 196 MG/DL (ref 74–106)
GLUCOSE BLD-MCNC: 199 MG/DL (ref 74–106)
GLUCOSE BLD-MCNC: 222 MG/DL (ref 74–106)
HCT VFR BLD CALC: 30.3 % (ref 37–47)
HEMOGLOBIN: 8.8 G/DL (ref 11.5–16.5)
MCH RBC QN AUTO: 24.4 PG (ref 27–32)
MCHC RBC AUTO-ENTMCNC: 29 G/DL (ref 31–35)
MCV RBC AUTO: 84.2 FL (ref 80–100)
PDW BLD-RTO: 18.6 % (ref 11–16)
PERFORMED ON: ABNORMAL
PLATELET # BLD: 129 K/UL (ref 150–400)
PMV BLD AUTO: 10 FL (ref 6–10)
POTASSIUM SERPL-SCNC: 4.5 MMOL/L (ref 3.4–5.1)
RBC # BLD: 3.6 M/UL (ref 3.8–5.8)
SODIUM BLD-SCNC: 142 MMOL/L (ref 136–145)
WBC # BLD: 4.8 K/UL (ref 4–11)

## 2020-04-19 PROCEDURE — 36415 COLL VENOUS BLD VENIPUNCTURE: CPT

## 2020-04-19 PROCEDURE — 6370000000 HC RX 637 (ALT 250 FOR IP): Performed by: PHYSICIAN ASSISTANT

## 2020-04-19 PROCEDURE — 2500000003 HC RX 250 WO HCPCS: Performed by: PHYSICIAN ASSISTANT

## 2020-04-19 PROCEDURE — 6370000000 HC RX 637 (ALT 250 FOR IP): Performed by: INTERNAL MEDICINE

## 2020-04-19 PROCEDURE — 80048 BASIC METABOLIC PNL TOTAL CA: CPT

## 2020-04-19 PROCEDURE — 2580000003 HC RX 258: Performed by: PHYSICIAN ASSISTANT

## 2020-04-19 PROCEDURE — 1200000002 HC SEMI PRIVATE SWING BED

## 2020-04-19 PROCEDURE — 85027 COMPLETE CBC AUTOMATED: CPT

## 2020-04-19 PROCEDURE — 6360000002 HC RX W HCPCS: Performed by: PHYSICIAN ASSISTANT

## 2020-04-19 RX ORDER — BUMETANIDE 0.25 MG/ML
INJECTION, SOLUTION INTRAMUSCULAR; INTRAVENOUS
Status: DISPENSED
Start: 2020-04-19 | End: 2020-04-20

## 2020-04-19 RX ORDER — BUMETANIDE 0.25 MG/ML
2 INJECTION, SOLUTION INTRAMUSCULAR; INTRAVENOUS EVERY 8 HOURS
Status: DISCONTINUED | OUTPATIENT
Start: 2020-04-19 | End: 2020-04-22

## 2020-04-19 RX ORDER — BUMETANIDE 0.25 MG/ML
INJECTION, SOLUTION INTRAMUSCULAR; INTRAVENOUS
Status: DISPENSED
Start: 2020-04-19 | End: 2020-04-19

## 2020-04-19 RX ADMIN — ASPIRIN 81 MG: 81 TABLET, COATED ORAL at 07:56

## 2020-04-19 RX ADMIN — APIXABAN 5 MG: 5 TABLET, FILM COATED ORAL at 20:14

## 2020-04-19 RX ADMIN — PANTOPRAZOLE SODIUM 40 MG: 40 TABLET, DELAYED RELEASE ORAL at 07:56

## 2020-04-19 RX ADMIN — LEVOTHYROXINE SODIUM 175 MCG: 125 TABLET ORAL at 05:25

## 2020-04-19 RX ADMIN — Medication 1 CAPSULE: at 16:39

## 2020-04-19 RX ADMIN — FERROUS SULFATE TAB EC 324 MG (65 MG FE EQUIVALENT) 324 MG: 324 (65 FE) TABLET DELAYED RESPONSE at 07:56

## 2020-04-19 RX ADMIN — FERROUS SULFATE TAB EC 324 MG (65 MG FE EQUIVALENT) 324 MG: 324 (65 FE) TABLET DELAYED RESPONSE at 16:39

## 2020-04-19 RX ADMIN — INSULIN LISPRO 1 UNITS: 100 INJECTION, SOLUTION INTRAVENOUS; SUBCUTANEOUS at 16:41

## 2020-04-19 RX ADMIN — INSULIN LISPRO 1 UNITS: 100 INJECTION, SOLUTION INTRAVENOUS; SUBCUTANEOUS at 20:15

## 2020-04-19 RX ADMIN — MEROPENEM 1 G: 1 INJECTION INTRAVENOUS at 16:40

## 2020-04-19 RX ADMIN — DOCUSATE SODIUM 100 MG: 100 CAPSULE, LIQUID FILLED ORAL at 20:14

## 2020-04-19 RX ADMIN — ZINC SULFATE 220 MG (50 MG) CAPSULE 50 MG: CAPSULE at 20:14

## 2020-04-19 RX ADMIN — MULTIPLE VITAMINS W/ MINERALS TAB 1 TABLET: TAB at 07:56

## 2020-04-19 RX ADMIN — MICONAZOLE NITRATE: 20 POWDER TOPICAL at 20:15

## 2020-04-19 RX ADMIN — NEBIVOLOL HYDROCHLORIDE 5 MG: 5 TABLET ORAL at 07:55

## 2020-04-19 RX ADMIN — MICONAZOLE NITRATE: 20 POWDER TOPICAL at 10:03

## 2020-04-19 RX ADMIN — METOLAZONE 5 MG: 5 TABLET ORAL at 07:56

## 2020-04-19 RX ADMIN — ATORVASTATIN CALCIUM 10 MG: 10 TABLET, FILM COATED ORAL at 07:56

## 2020-04-19 RX ADMIN — SALINE NASAL SPRAY 1 SPRAY: 1.5 SOLUTION NASAL at 07:55

## 2020-04-19 RX ADMIN — INSULIN GLARGINE 12 UNITS: 100 INJECTION, SOLUTION SUBCUTANEOUS at 20:15

## 2020-04-19 RX ADMIN — BUMETANIDE 2 MG: 0.25 INJECTION INTRAMUSCULAR; INTRAVENOUS at 09:52

## 2020-04-19 RX ADMIN — ZINC SULFATE 220 MG (50 MG) CAPSULE 50 MG: CAPSULE at 07:56

## 2020-04-19 RX ADMIN — SILVER SULFADIAZINE: 10 CREAM TOPICAL at 07:56

## 2020-04-19 RX ADMIN — MEROPENEM 1 G: 1 INJECTION INTRAVENOUS at 05:25

## 2020-04-19 RX ADMIN — APIXABAN 5 MG: 5 TABLET, FILM COATED ORAL at 07:56

## 2020-04-19 RX ADMIN — OXYCODONE HYDROCHLORIDE AND ACETAMINOPHEN 500 MG: 500 TABLET ORAL at 07:56

## 2020-04-19 RX ADMIN — INSULIN LISPRO 1 UNITS: 100 INJECTION, SOLUTION INTRAVENOUS; SUBCUTANEOUS at 11:46

## 2020-04-19 RX ADMIN — ISOSORBIDE MONONITRATE 30 MG: 30 TABLET, EXTENDED RELEASE ORAL at 07:56

## 2020-04-19 RX ADMIN — AMIODARONE HYDROCHLORIDE 100 MG: 200 TABLET ORAL at 09:56

## 2020-04-19 RX ADMIN — Medication 1 CAPSULE: at 07:56

## 2020-04-19 RX ADMIN — SALINE NASAL SPRAY 1 SPRAY: 1.5 SOLUTION NASAL at 20:14

## 2020-04-19 RX ADMIN — BUMETANIDE 2 MG: 0.25 INJECTION INTRAMUSCULAR; INTRAVENOUS at 16:40

## 2020-04-19 RX ADMIN — DOCUSATE SODIUM 100 MG: 100 CAPSULE, LIQUID FILLED ORAL at 07:55

## 2020-04-20 LAB
ANION GAP SERPL CALCULATED.3IONS-SCNC: 13 MMOL/L (ref 3–16)
BUN BLDV-MCNC: 73 MG/DL (ref 6–20)
CALCIUM SERPL-MCNC: 8.7 MG/DL (ref 8.5–10.5)
CHLORIDE BLD-SCNC: 99 MMOL/L (ref 98–107)
CO2: 29 MMOL/L (ref 20–30)
CREAT SERPL-MCNC: 2.7 MG/DL (ref 0.4–1.2)
FERRITIN: 142 NG/ML (ref 22–322)
GFR AFRICAN AMERICAN: 22
GFR NON-AFRICAN AMERICAN: 18
GLUCOSE BLD-MCNC: 129 MG/DL (ref 74–106)
GLUCOSE BLD-MCNC: 140 MG/DL (ref 74–106)
GLUCOSE BLD-MCNC: 154 MG/DL (ref 74–106)
GLUCOSE BLD-MCNC: 159 MG/DL (ref 74–106)
GLUCOSE BLD-MCNC: 216 MG/DL (ref 74–106)
HCT VFR BLD CALC: 29.3 % (ref 37–47)
HEMOGLOBIN: 8.6 G/DL (ref 11.5–16.5)
IRON: 26 UG/DL (ref 37–145)
MCH RBC QN AUTO: 24.5 PG (ref 27–32)
MCHC RBC AUTO-ENTMCNC: 29.4 G/DL (ref 31–35)
MCV RBC AUTO: 83.5 FL (ref 80–100)
PDW BLD-RTO: 18.8 % (ref 11–16)
PERFORMED ON: ABNORMAL
PLATELET # BLD: 140 K/UL (ref 150–400)
PMV BLD AUTO: 10.3 FL (ref 6–10)
POTASSIUM SERPL-SCNC: 4.4 MMOL/L (ref 3.4–5.1)
RBC # BLD: 3.51 M/UL (ref 3.8–5.8)
SODIUM BLD-SCNC: 141 MMOL/L (ref 136–145)
TOTAL IRON BINDING CAPACITY: 249 UG/DL (ref 250–450)
WBC # BLD: 5.3 K/UL (ref 4–11)

## 2020-04-20 PROCEDURE — 6370000000 HC RX 637 (ALT 250 FOR IP): Performed by: INTERNAL MEDICINE

## 2020-04-20 PROCEDURE — 2500000003 HC RX 250 WO HCPCS: Performed by: PHYSICIAN ASSISTANT

## 2020-04-20 PROCEDURE — 97530 THERAPEUTIC ACTIVITIES: CPT

## 2020-04-20 PROCEDURE — 36415 COLL VENOUS BLD VENIPUNCTURE: CPT

## 2020-04-20 PROCEDURE — 85027 COMPLETE CBC AUTOMATED: CPT

## 2020-04-20 PROCEDURE — 97110 THERAPEUTIC EXERCISES: CPT

## 2020-04-20 PROCEDURE — 6360000002 HC RX W HCPCS: Performed by: PHYSICIAN ASSISTANT

## 2020-04-20 PROCEDURE — 82728 ASSAY OF FERRITIN: CPT

## 2020-04-20 PROCEDURE — 2580000003 HC RX 258: Performed by: PHYSICIAN ASSISTANT

## 2020-04-20 PROCEDURE — 1200000002 HC SEMI PRIVATE SWING BED

## 2020-04-20 PROCEDURE — 2700000000 HC OXYGEN THERAPY PER DAY

## 2020-04-20 PROCEDURE — 83540 ASSAY OF IRON: CPT

## 2020-04-20 PROCEDURE — 2500000003 HC RX 250 WO HCPCS

## 2020-04-20 PROCEDURE — 80048 BASIC METABOLIC PNL TOTAL CA: CPT

## 2020-04-20 PROCEDURE — 83550 IRON BINDING TEST: CPT

## 2020-04-20 PROCEDURE — 6370000000 HC RX 637 (ALT 250 FOR IP): Performed by: PHYSICIAN ASSISTANT

## 2020-04-20 RX ORDER — BUMETANIDE 0.25 MG/ML
INJECTION, SOLUTION INTRAMUSCULAR; INTRAVENOUS
Status: COMPLETED
Start: 2020-04-20 | End: 2020-04-20

## 2020-04-20 RX ORDER — BUMETANIDE 0.25 MG/ML
INJECTION, SOLUTION INTRAMUSCULAR; INTRAVENOUS
Status: DISPENSED
Start: 2020-04-20 | End: 2020-04-21

## 2020-04-20 RX ADMIN — ZINC SULFATE 220 MG (50 MG) CAPSULE 50 MG: CAPSULE at 09:36

## 2020-04-20 RX ADMIN — IRON SUCROSE 200 MG: 20 INJECTION, SOLUTION INTRAVENOUS at 14:21

## 2020-04-20 RX ADMIN — BUMETANIDE 2 MG: 0.25 INJECTION INTRAMUSCULAR; INTRAVENOUS at 01:32

## 2020-04-20 RX ADMIN — ASPIRIN 81 MG: 81 TABLET, COATED ORAL at 09:35

## 2020-04-20 RX ADMIN — MULTIPLE VITAMINS W/ MINERALS TAB 1 TABLET: TAB at 09:36

## 2020-04-20 RX ADMIN — MEROPENEM 1 G: 1 INJECTION INTRAVENOUS at 05:34

## 2020-04-20 RX ADMIN — INSULIN LISPRO 1 UNITS: 100 INJECTION, SOLUTION INTRAVENOUS; SUBCUTANEOUS at 09:38

## 2020-04-20 RX ADMIN — APIXABAN 5 MG: 5 TABLET, FILM COATED ORAL at 20:58

## 2020-04-20 RX ADMIN — BUMETANIDE 2 MG: 0.25 INJECTION INTRAMUSCULAR; INTRAVENOUS at 23:58

## 2020-04-20 RX ADMIN — APIXABAN 5 MG: 5 TABLET, FILM COATED ORAL at 09:35

## 2020-04-20 RX ADMIN — MICONAZOLE NITRATE: 20 POWDER TOPICAL at 20:58

## 2020-04-20 RX ADMIN — SILVER SULFADIAZINE: 10 CREAM TOPICAL at 09:35

## 2020-04-20 RX ADMIN — PANTOPRAZOLE SODIUM 40 MG: 40 TABLET, DELAYED RELEASE ORAL at 09:34

## 2020-04-20 RX ADMIN — LEVOTHYROXINE SODIUM 175 MCG: 125 TABLET ORAL at 05:35

## 2020-04-20 RX ADMIN — ZINC SULFATE 220 MG (50 MG) CAPSULE 50 MG: CAPSULE at 20:58

## 2020-04-20 RX ADMIN — INSULIN LISPRO 1 UNITS: 100 INJECTION, SOLUTION INTRAVENOUS; SUBCUTANEOUS at 17:37

## 2020-04-20 RX ADMIN — Medication 1 CAPSULE: at 09:36

## 2020-04-20 RX ADMIN — ISOSORBIDE MONONITRATE 30 MG: 30 TABLET, EXTENDED RELEASE ORAL at 09:34

## 2020-04-20 RX ADMIN — EPOETIN ALFA-EPBX 10000 UNITS: 10000 INJECTION, SOLUTION INTRAVENOUS; SUBCUTANEOUS at 09:34

## 2020-04-20 RX ADMIN — ATORVASTATIN CALCIUM 10 MG: 10 TABLET, FILM COATED ORAL at 09:35

## 2020-04-20 RX ADMIN — INSULIN GLARGINE 12 UNITS: 100 INJECTION, SOLUTION SUBCUTANEOUS at 20:59

## 2020-04-20 RX ADMIN — FERROUS SULFATE TAB EC 324 MG (65 MG FE EQUIVALENT) 324 MG: 324 (65 FE) TABLET DELAYED RESPONSE at 17:40

## 2020-04-20 RX ADMIN — DOCUSATE SODIUM 100 MG: 100 CAPSULE, LIQUID FILLED ORAL at 20:58

## 2020-04-20 RX ADMIN — SALINE NASAL SPRAY 1 SPRAY: 1.5 SOLUTION NASAL at 09:34

## 2020-04-20 RX ADMIN — ALUMINUM HYDROXIDE, MAGNESIUM HYDROXIDE, AND SIMETHICONE 30 ML: 200; 200; 20 SUSPENSION ORAL at 03:02

## 2020-04-20 RX ADMIN — SALINE NASAL SPRAY 1 SPRAY: 1.5 SOLUTION NASAL at 20:59

## 2020-04-20 RX ADMIN — BUMETANIDE 2 MG: 0.25 INJECTION INTRAMUSCULAR; INTRAVENOUS at 17:40

## 2020-04-20 RX ADMIN — MEROPENEM 1 G: 1 INJECTION INTRAVENOUS at 17:39

## 2020-04-20 RX ADMIN — INSULIN LISPRO 1 UNITS: 100 INJECTION, SOLUTION INTRAVENOUS; SUBCUTANEOUS at 20:58

## 2020-04-20 RX ADMIN — BUMETANIDE 2 MG: 0.25 INJECTION, SOLUTION INTRAMUSCULAR; INTRAVENOUS at 17:40

## 2020-04-20 RX ADMIN — METOLAZONE 5 MG: 5 TABLET ORAL at 09:36

## 2020-04-20 RX ADMIN — Medication 1 CAPSULE: at 17:40

## 2020-04-20 RX ADMIN — MICONAZOLE NITRATE: 20 POWDER TOPICAL at 09:48

## 2020-04-20 RX ADMIN — FERROUS SULFATE TAB EC 324 MG (65 MG FE EQUIVALENT) 324 MG: 324 (65 FE) TABLET DELAYED RESPONSE at 09:36

## 2020-04-20 RX ADMIN — DOCUSATE SODIUM 100 MG: 100 CAPSULE, LIQUID FILLED ORAL at 09:34

## 2020-04-20 RX ADMIN — NEBIVOLOL HYDROCHLORIDE 5 MG: 5 TABLET ORAL at 09:36

## 2020-04-20 RX ADMIN — OXYCODONE HYDROCHLORIDE AND ACETAMINOPHEN 500 MG: 500 TABLET ORAL at 09:36

## 2020-04-20 NOTE — PLAN OF CARE
Problem: Pain:  Goal: Pain level will decrease  Description: Pain level will decrease  Outcome: Ongoing     Problem: Falls - Risk of:  Goal: Will remain free from falls  Description: Will remain free from falls  Outcome: Ongoing

## 2020-04-21 LAB
ANION GAP SERPL CALCULATED.3IONS-SCNC: 12 MMOL/L (ref 3–16)
BUN BLDV-MCNC: 76 MG/DL (ref 6–20)
CALCIUM SERPL-MCNC: 9.1 MG/DL (ref 8.5–10.5)
CHLORIDE BLD-SCNC: 99 MMOL/L (ref 98–107)
CO2: 30 MMOL/L (ref 20–30)
CREAT SERPL-MCNC: 2.7 MG/DL (ref 0.4–1.2)
GFR AFRICAN AMERICAN: 22
GFR NON-AFRICAN AMERICAN: 18
GLUCOSE BLD-MCNC: 127 MG/DL (ref 74–106)
GLUCOSE BLD-MCNC: 144 MG/DL (ref 74–106)
GLUCOSE BLD-MCNC: 157 MG/DL (ref 74–106)
GLUCOSE BLD-MCNC: 158 MG/DL (ref 74–106)
GLUCOSE BLD-MCNC: 178 MG/DL (ref 74–106)
HCT VFR BLD CALC: 30.7 % (ref 37–47)
HEMOGLOBIN: 9 G/DL (ref 11.5–16.5)
MCH RBC QN AUTO: 24.4 PG (ref 27–32)
MCHC RBC AUTO-ENTMCNC: 29.3 G/DL (ref 31–35)
MCV RBC AUTO: 83.2 FL (ref 80–100)
PDW BLD-RTO: 18.7 % (ref 11–16)
PERFORMED ON: ABNORMAL
PLATELET # BLD: 155 K/UL (ref 150–400)
PMV BLD AUTO: 9.9 FL (ref 6–10)
POTASSIUM SERPL-SCNC: 4.2 MMOL/L (ref 3.4–5.1)
RBC # BLD: 3.69 M/UL (ref 3.8–5.8)
SODIUM BLD-SCNC: 141 MMOL/L (ref 136–145)
WBC # BLD: 4.7 K/UL (ref 4–11)

## 2020-04-21 PROCEDURE — 2500000003 HC RX 250 WO HCPCS: Performed by: PHYSICIAN ASSISTANT

## 2020-04-21 PROCEDURE — 6370000000 HC RX 637 (ALT 250 FOR IP): Performed by: PHYSICIAN ASSISTANT

## 2020-04-21 PROCEDURE — 1200000002 HC SEMI PRIVATE SWING BED

## 2020-04-21 PROCEDURE — 97530 THERAPEUTIC ACTIVITIES: CPT

## 2020-04-21 PROCEDURE — 97535 SELF CARE MNGMENT TRAINING: CPT

## 2020-04-21 PROCEDURE — 85027 COMPLETE CBC AUTOMATED: CPT

## 2020-04-21 PROCEDURE — 97803 MED NUTRITION INDIV SUBSEQ: CPT

## 2020-04-21 PROCEDURE — 6370000000 HC RX 637 (ALT 250 FOR IP): Performed by: INTERNAL MEDICINE

## 2020-04-21 PROCEDURE — 80048 BASIC METABOLIC PNL TOTAL CA: CPT

## 2020-04-21 PROCEDURE — 97110 THERAPEUTIC EXERCISES: CPT

## 2020-04-21 PROCEDURE — 36415 COLL VENOUS BLD VENIPUNCTURE: CPT

## 2020-04-21 RX ADMIN — AMIODARONE HYDROCHLORIDE 100 MG: 200 TABLET ORAL at 11:00

## 2020-04-21 RX ADMIN — INSULIN GLARGINE 12 UNITS: 100 INJECTION, SOLUTION SUBCUTANEOUS at 20:08

## 2020-04-21 RX ADMIN — SALINE NASAL SPRAY 1 SPRAY: 1.5 SOLUTION NASAL at 20:05

## 2020-04-21 RX ADMIN — FERROUS SULFATE TAB EC 324 MG (65 MG FE EQUIVALENT) 324 MG: 324 (65 FE) TABLET DELAYED RESPONSE at 08:08

## 2020-04-21 RX ADMIN — Medication 1 CAPSULE: at 08:08

## 2020-04-21 RX ADMIN — ACETAMINOPHEN 650 MG: 325 TABLET, FILM COATED ORAL at 23:16

## 2020-04-21 RX ADMIN — LEVOTHYROXINE SODIUM 175 MCG: 125 TABLET ORAL at 06:03

## 2020-04-21 RX ADMIN — SILVER SULFADIAZINE: 10 CREAM TOPICAL at 08:08

## 2020-04-21 RX ADMIN — ZINC SULFATE 220 MG (50 MG) CAPSULE 50 MG: CAPSULE at 08:08

## 2020-04-21 RX ADMIN — MICONAZOLE NITRATE: 20 POWDER TOPICAL at 20:05

## 2020-04-21 RX ADMIN — MICONAZOLE NITRATE: 20 POWDER TOPICAL at 08:08

## 2020-04-21 RX ADMIN — APIXABAN 5 MG: 5 TABLET, FILM COATED ORAL at 08:07

## 2020-04-21 RX ADMIN — OXYCODONE HYDROCHLORIDE AND ACETAMINOPHEN 500 MG: 500 TABLET ORAL at 08:08

## 2020-04-21 RX ADMIN — ISOSORBIDE MONONITRATE 30 MG: 30 TABLET, EXTENDED RELEASE ORAL at 08:08

## 2020-04-21 RX ADMIN — METOLAZONE 5 MG: 5 TABLET ORAL at 08:08

## 2020-04-21 RX ADMIN — Medication 1 CAPSULE: at 16:48

## 2020-04-21 RX ADMIN — DOCUSATE SODIUM 100 MG: 100 CAPSULE, LIQUID FILLED ORAL at 20:05

## 2020-04-21 RX ADMIN — SALINE NASAL SPRAY 1 SPRAY: 1.5 SOLUTION NASAL at 08:08

## 2020-04-21 RX ADMIN — ASPIRIN 81 MG: 81 TABLET, COATED ORAL at 08:07

## 2020-04-21 RX ADMIN — APIXABAN 5 MG: 5 TABLET, FILM COATED ORAL at 20:05

## 2020-04-21 RX ADMIN — INSULIN LISPRO 1 UNITS: 100 INJECTION, SOLUTION INTRAVENOUS; SUBCUTANEOUS at 11:03

## 2020-04-21 RX ADMIN — MULTIPLE VITAMINS W/ MINERALS TAB 1 TABLET: TAB at 08:08

## 2020-04-21 RX ADMIN — ZINC SULFATE 220 MG (50 MG) CAPSULE 50 MG: CAPSULE at 20:05

## 2020-04-21 RX ADMIN — ATORVASTATIN CALCIUM 10 MG: 10 TABLET, FILM COATED ORAL at 08:09

## 2020-04-21 RX ADMIN — INSULIN LISPRO 1 UNITS: 100 INJECTION, SOLUTION INTRAVENOUS; SUBCUTANEOUS at 20:08

## 2020-04-21 RX ADMIN — BUMETANIDE 2 MG: 0.25 INJECTION INTRAMUSCULAR; INTRAVENOUS at 23:16

## 2020-04-21 RX ADMIN — DOCUSATE SODIUM 100 MG: 100 CAPSULE, LIQUID FILLED ORAL at 08:08

## 2020-04-21 RX ADMIN — BUMETANIDE 2 MG: 0.25 INJECTION INTRAMUSCULAR; INTRAVENOUS at 08:10

## 2020-04-21 RX ADMIN — NEBIVOLOL HYDROCHLORIDE 5 MG: 5 TABLET ORAL at 08:07

## 2020-04-21 RX ADMIN — PANTOPRAZOLE SODIUM 40 MG: 40 TABLET, DELAYED RELEASE ORAL at 08:08

## 2020-04-21 RX ADMIN — FERROUS SULFATE TAB EC 324 MG (65 MG FE EQUIVALENT) 324 MG: 324 (65 FE) TABLET DELAYED RESPONSE at 16:48

## 2020-04-21 RX ADMIN — BUMETANIDE 2 MG: 0.25 INJECTION INTRAMUSCULAR; INTRAVENOUS at 16:48

## 2020-04-21 RX ADMIN — INSULIN LISPRO 1 UNITS: 100 INJECTION, SOLUTION INTRAVENOUS; SUBCUTANEOUS at 16:49

## 2020-04-21 ASSESSMENT — PAIN SCALES - GENERAL
PAINLEVEL_OUTOF10: 6
PAINLEVEL_OUTOF10: 0

## 2020-04-21 NOTE — FLOWSHEET NOTE
04/21/20 0900   Assessment   Charting Type Shift assessment   Neurological   Neuro (WDL) WDL   Level of Consciousness 0   Swallow Screening   Is the patient able to remain alert for testing? Yes   Was the Patient Eating a Modified Diet Prior to being Admitted? No   [place] Coma Scale   Eye Opening 4   Best Verbal Response 5   Best Motor Response 6   [place] Coma Scale Score 15   NIH/MNHISS Stroke Scale   NIH/MNIHSS Stroke Scale Assessed No   HEENT   HEENT (WDL) X   Voice Normal   Mucous Membrane Moist   Teeth Edentulous   Right Eye Impaired vision   Left Eye Impaired vision   Respiratory   Respiratory (WDL) X   Subcutaneous air/Crepitus None   Respiratory Pattern Regular   Respiratory Depth Normal   Respiratory Quality/Effort Unlabored;Dyspnea with exertion   Chest Assessment Chest expansion symmetrical;Trachea midline   L Breath Sounds Diminished   R Breath Sounds Diminished   Cough/Sputum   Cough Congested   Incentive Spirometry Tx   Respiratory Effort Dyspnea with Exertion   Cough and Deep Breathe   Cough and Deep Breathe Yes   Cardiac   Cardiac (WDL) X   Cardiac Regularity Irregular   Cardiac Monitor   Telemetry Monitor On No   Gastrointestinal   Abdominal (WDL) X   RUQ Bowel Sounds Active   LUQ Bowel Sounds Active   RLQ Bowel Sounds Active   LLQ Bowel Sounds Active   Abdomen Inspection Distended;Rotund;Rounded  (obese)   Peripheral Vascular   Peripheral Vascular (WDL) X   Edema Generalized;Right lower extremity; Left lower extremity   Edema Generalized +3;Pitting   RLE Edema +3;Pitting   LLE Edema +3;Pitting   RUE Neurovascular Assessment   Capillary Refill Less than/equal to 3 seconds   Color Pale   Temperature Warm   LUE Neurovascular Assessment   Capillary Refill Less than/equal to 3 seconds   Color Pale   Temperature Warm   RLE Neurovascular Assessment   Capillary Refill Less than/equal to 3 seconds   Color Red   Temperature Warm   Sensation RLE Full sensation   LLE Neurovascular Assessment   Capillary Refill Less than/equal to 3 seconds   Color Red   Temperature Warm   Sensation LLE Full sensation   Skin Color/Condition   Skin Color/Condition (WDL) X   Skin Color Pale   Skin Condition/Temp Dry; Warm   Skin Integrity   Skin Integrity (WDL) X   Skin Integrity Bruising; Redness   Location BUE/BLE   Preventative Dressing No   Skin Fold Management Yes   Dressing Site Abdominal pannus   Treatment Pharmaceutical   Assessed this shift Red;Moist   Multiple Skin Integrity Sites Yes   Date applied? 04/21/20   Skin Integrity Site 2   Skin Integrity Location 2 Bruising   Location 2 scattered   Preventative Dressing No   Assessed this shift? Yes   Musculoskeletal   Musculoskeletal (WDL) X   RUE Full movement   LUE Full movement   RL Extremity Limited movement;Weakness   LL Extremity Limited movement;Weakness   Genitourinary   Genitourinary (WDL) X  (barney in place)   Anus/Rectum   Anus/Rectum (WDL) WDL   Urethral Catheter Non-latex 18 fr   Placement Date/Time: 04/18/20 0429   Urethral Catheter Timeout: Patient;Sterile technique;Procedure  Inserted by:  RN  Catheter Type: Non-latex  Tube Size (fr): 18 fr  Catheter Balloon Size: 10 mL  Collection Container: Standard  Securement Method: . .. Catheter Indications Need for fluid management in critically ill patients in a critical care setting not able to be managed by other means such as BSC with hat, bedpan, urinal, condom catheter, or short term intermittent urethral catherization   Site Assessment Pink   Urine Color Yellow   Urine Appearance Red flecks   Psychosocial   Psychosocial (WDL) WDL   Pt.  Alert times 4 this am -Pt able to take am medications well whole- No complaints of pain at this time-Barney intact draining -See flowsheet for details- IV flushing well and patent - No signs of infection noted at IV site- Pt demonstrated IS correctly- Call bell within reach-Pt aware to call out with any need- Will monitor

## 2020-04-22 LAB
A/G RATIO: 1.1 (ref 0.8–2)
ALBUMIN SERPL-MCNC: 3.6 G/DL (ref 3.4–4.8)
ALP BLD-CCNC: 158 U/L (ref 25–100)
ALT SERPL-CCNC: 10 U/L (ref 4–36)
ANION GAP SERPL CALCULATED.3IONS-SCNC: 14 MMOL/L (ref 3–16)
AST SERPL-CCNC: 19 U/L (ref 8–33)
BILIRUB SERPL-MCNC: 0.4 MG/DL (ref 0.3–1.2)
BUN BLDV-MCNC: 76 MG/DL (ref 6–20)
CALCIUM SERPL-MCNC: 9.4 MG/DL (ref 8.5–10.5)
CHLORIDE BLD-SCNC: 97 MMOL/L (ref 98–107)
CO2: 31 MMOL/L (ref 20–30)
CREAT SERPL-MCNC: 2.8 MG/DL (ref 0.4–1.2)
GFR AFRICAN AMERICAN: 21
GFR NON-AFRICAN AMERICAN: 17
GLOBULIN: 3.4 G/DL
GLUCOSE BLD-MCNC: 119 MG/DL (ref 74–106)
GLUCOSE BLD-MCNC: 119 MG/DL (ref 74–106)
GLUCOSE BLD-MCNC: 144 MG/DL (ref 74–106)
GLUCOSE BLD-MCNC: 170 MG/DL (ref 74–106)
GLUCOSE BLD-MCNC: 180 MG/DL (ref 74–106)
HCT VFR BLD CALC: 31.6 % (ref 37–47)
HEMOGLOBIN: 9.1 G/DL (ref 11.5–16.5)
MCH RBC QN AUTO: 24.1 PG (ref 27–32)
MCHC RBC AUTO-ENTMCNC: 28.8 G/DL (ref 31–35)
MCV RBC AUTO: 83.6 FL (ref 80–100)
PDW BLD-RTO: 18.8 % (ref 11–16)
PERFORMED ON: ABNORMAL
PLATELET # BLD: 158 K/UL (ref 150–400)
PMV BLD AUTO: 10.6 FL (ref 6–10)
POTASSIUM SERPL-SCNC: 3.9 MMOL/L (ref 3.4–5.1)
RBC # BLD: 3.78 M/UL (ref 3.8–5.8)
SODIUM BLD-SCNC: 142 MMOL/L (ref 136–145)
TOTAL PROTEIN: 7 G/DL (ref 6.4–8.3)
WBC # BLD: 4.8 K/UL (ref 4–11)

## 2020-04-22 PROCEDURE — 6370000000 HC RX 637 (ALT 250 FOR IP): Performed by: PHYSICIAN ASSISTANT

## 2020-04-22 PROCEDURE — 6370000000 HC RX 637 (ALT 250 FOR IP): Performed by: INTERNAL MEDICINE

## 2020-04-22 PROCEDURE — 36415 COLL VENOUS BLD VENIPUNCTURE: CPT

## 2020-04-22 PROCEDURE — 1200000002 HC SEMI PRIVATE SWING BED

## 2020-04-22 PROCEDURE — 85027 COMPLETE CBC AUTOMATED: CPT

## 2020-04-22 PROCEDURE — 2500000003 HC RX 250 WO HCPCS: Performed by: PHYSICIAN ASSISTANT

## 2020-04-22 PROCEDURE — 97110 THERAPEUTIC EXERCISES: CPT

## 2020-04-22 PROCEDURE — 80053 COMPREHEN METABOLIC PANEL: CPT

## 2020-04-22 PROCEDURE — 94762 N-INVAS EAR/PLS OXIMTRY CONT: CPT

## 2020-04-22 RX ORDER — BUMETANIDE 1 MG/1
2 TABLET ORAL 2 TIMES DAILY
Status: DISCONTINUED | OUTPATIENT
Start: 2020-04-22 | End: 2020-04-27 | Stop reason: HOSPADM

## 2020-04-22 RX ORDER — HYDROXYZINE PAMOATE 25 MG/1
25 CAPSULE ORAL 2 TIMES DAILY PRN
Status: DISCONTINUED | OUTPATIENT
Start: 2020-04-22 | End: 2020-04-27 | Stop reason: HOSPADM

## 2020-04-22 RX ADMIN — METOLAZONE 5 MG: 5 TABLET ORAL at 08:20

## 2020-04-22 RX ADMIN — FERROUS SULFATE TAB EC 324 MG (65 MG FE EQUIVALENT) 324 MG: 324 (65 FE) TABLET DELAYED RESPONSE at 16:45

## 2020-04-22 RX ADMIN — OXYCODONE HYDROCHLORIDE AND ACETAMINOPHEN 500 MG: 500 TABLET ORAL at 08:20

## 2020-04-22 RX ADMIN — INSULIN LISPRO 1 UNITS: 100 INJECTION, SOLUTION INTRAVENOUS; SUBCUTANEOUS at 16:45

## 2020-04-22 RX ADMIN — SILVER SULFADIAZINE: 10 CREAM TOPICAL at 08:19

## 2020-04-22 RX ADMIN — ISOSORBIDE MONONITRATE 30 MG: 30 TABLET, EXTENDED RELEASE ORAL at 08:18

## 2020-04-22 RX ADMIN — ZINC SULFATE 220 MG (50 MG) CAPSULE 50 MG: CAPSULE at 20:18

## 2020-04-22 RX ADMIN — ACETAMINOPHEN 650 MG: 325 TABLET, FILM COATED ORAL at 08:18

## 2020-04-22 RX ADMIN — MULTIPLE VITAMINS W/ MINERALS TAB 1 TABLET: TAB at 08:20

## 2020-04-22 RX ADMIN — LEVOTHYROXINE SODIUM 175 MCG: 125 TABLET ORAL at 05:20

## 2020-04-22 RX ADMIN — HYDROXYZINE PAMOATE 25 MG: 25 CAPSULE ORAL at 12:34

## 2020-04-22 RX ADMIN — MICONAZOLE NITRATE: 20 POWDER TOPICAL at 08:31

## 2020-04-22 RX ADMIN — MICONAZOLE NITRATE: 20 POWDER TOPICAL at 20:27

## 2020-04-22 RX ADMIN — BUMETANIDE 2 MG: 0.25 INJECTION INTRAMUSCULAR; INTRAVENOUS at 08:21

## 2020-04-22 RX ADMIN — BUMETANIDE 2 MG: 1 TABLET ORAL at 20:18

## 2020-04-22 RX ADMIN — ZINC SULFATE 220 MG (50 MG) CAPSULE 50 MG: CAPSULE at 08:20

## 2020-04-22 RX ADMIN — APIXABAN 5 MG: 5 TABLET, FILM COATED ORAL at 20:18

## 2020-04-22 RX ADMIN — DOCUSATE SODIUM 100 MG: 100 CAPSULE, LIQUID FILLED ORAL at 08:19

## 2020-04-22 RX ADMIN — POLYETHYLENE GLYCOL 3350 17 G: 17 POWDER, FOR SOLUTION ORAL at 08:19

## 2020-04-22 RX ADMIN — INSULIN LISPRO 1 UNITS: 100 INJECTION, SOLUTION INTRAVENOUS; SUBCUTANEOUS at 11:10

## 2020-04-22 RX ADMIN — ASPIRIN 81 MG: 81 TABLET, COATED ORAL at 08:20

## 2020-04-22 RX ADMIN — PANTOPRAZOLE SODIUM 40 MG: 40 TABLET, DELAYED RELEASE ORAL at 08:20

## 2020-04-22 RX ADMIN — DOCUSATE SODIUM 100 MG: 100 CAPSULE, LIQUID FILLED ORAL at 20:18

## 2020-04-22 RX ADMIN — SALINE NASAL SPRAY 1 SPRAY: 1.5 SOLUTION NASAL at 20:27

## 2020-04-22 RX ADMIN — ACETAMINOPHEN 650 MG: 325 TABLET, FILM COATED ORAL at 20:18

## 2020-04-22 RX ADMIN — APIXABAN 5 MG: 5 TABLET, FILM COATED ORAL at 08:20

## 2020-04-22 RX ADMIN — NEBIVOLOL HYDROCHLORIDE 5 MG: 5 TABLET ORAL at 08:19

## 2020-04-22 RX ADMIN — Medication 1 CAPSULE: at 16:45

## 2020-04-22 RX ADMIN — ATORVASTATIN CALCIUM 10 MG: 10 TABLET, FILM COATED ORAL at 08:19

## 2020-04-22 RX ADMIN — ALUMINUM HYDROXIDE, MAGNESIUM HYDROXIDE, AND SIMETHICONE 30 ML: 200; 200; 20 SUSPENSION ORAL at 07:08

## 2020-04-22 RX ADMIN — Medication 1 CAPSULE: at 08:19

## 2020-04-22 RX ADMIN — FERROUS SULFATE TAB EC 324 MG (65 MG FE EQUIVALENT) 324 MG: 324 (65 FE) TABLET DELAYED RESPONSE at 08:20

## 2020-04-22 RX ADMIN — INSULIN LISPRO 1 UNITS: 100 INJECTION, SOLUTION INTRAVENOUS; SUBCUTANEOUS at 20:21

## 2020-04-22 RX ADMIN — INSULIN GLARGINE 12 UNITS: 100 INJECTION, SOLUTION SUBCUTANEOUS at 20:21

## 2020-04-22 RX ADMIN — SALINE NASAL SPRAY 1 SPRAY: 1.5 SOLUTION NASAL at 08:19

## 2020-04-22 ASSESSMENT — PAIN SCALES - GENERAL
PAINLEVEL_OUTOF10: 0
PAINLEVEL_OUTOF10: 3
PAINLEVEL_OUTOF10: 0
PAINLEVEL_OUTOF10: 4
PAINLEVEL_OUTOF10: 5

## 2020-04-22 ASSESSMENT — PAIN DESCRIPTION - PAIN TYPE: TYPE: ACUTE PAIN

## 2020-04-22 ASSESSMENT — PAIN DESCRIPTION - LOCATION: LOCATION: LEG

## 2020-04-22 ASSESSMENT — PAIN DESCRIPTION - ORIENTATION: ORIENTATION: RIGHT;LEFT

## 2020-04-22 NOTE — FLOWSHEET NOTE
04/22/20 0915   Assessment   Charting Type Shift assessment   Neurological   Neuro (WDL) WDL   Level of Consciousness 0   Swallow Screening   Is the patient able to remain alert for testing? Yes   Was the Patient Eating a Modified Diet Prior to being Admitted? No   [place] Coma Scale   Eye Opening 4   Best Verbal Response 5   Best Motor Response 6   [place] Coma Scale Score 15   NIH/MNHISS Stroke Scale   NIH/MNIHSS Stroke Scale Assessed No   HEENT   HEENT (WDL) X   Voice Normal   Mucous Membrane Moist   Teeth Edentulous   Right Eye Impaired vision   Left Eye Impaired vision   Respiratory   Respiratory (WDL) X   Subcutaneous air/Crepitus None   Respiratory Pattern Regular   Respiratory Depth Normal   Respiratory Quality/Effort Unlabored;Dyspnea with exertion   Chest Assessment Chest expansion symmetrical;Trachea midline   L Breath Sounds Diminished   R Breath Sounds Diminished   Breath Sounds   Right Upper Lobe Clear   Right Middle Lobe Diminished   Right Lower Lobe Diminished   Left Upper Lobe Clear   Left Lower Lobe Diminished   Cough/Sputum   Cough Congested   Incentive Spirometry Tx   Respiratory Effort Dyspnea with Exertion   Cough and Deep Breathe   Cough and Deep Breathe Yes   Cardiac   Cardiac (WDL) X   Cardiac Regularity Irregular   Cardiac Monitor   Telemetry Monitor On No   Gastrointestinal   Abdominal (WDL) X   RUQ Bowel Sounds Active   LUQ Bowel Sounds Active   RLQ Bowel Sounds Active   LLQ Bowel Sounds Active   Abdomen Inspection Distended;Gross ascites; Rotund;Rounded  (obese)   Peripheral Vascular   Peripheral Vascular (WDL) X   Edema Generalized;Right lower extremity; Left lower extremity   Edema Generalized +3;Pitting   RLE Edema +3;Pitting   LLE Edema +3;Pitting   RUE Neurovascular Assessment   Capillary Refill Less than/equal to 3 seconds   Color Pale   Temperature Warm   LUE Neurovascular Assessment   Capillary Refill Less than/equal to 3 seconds   Color Pale   Temperature Warm   RLE

## 2020-04-23 PROBLEM — G47.34 NOCTURNAL HYPOXIA: Status: ACTIVE | Noted: 2020-04-23

## 2020-04-23 LAB
GLUCOSE BLD-MCNC: 110 MG/DL (ref 74–106)
GLUCOSE BLD-MCNC: 127 MG/DL (ref 74–106)
GLUCOSE BLD-MCNC: 128 MG/DL (ref 74–106)
GLUCOSE BLD-MCNC: 188 MG/DL (ref 74–106)
PERFORMED ON: ABNORMAL

## 2020-04-23 PROCEDURE — 6370000000 HC RX 637 (ALT 250 FOR IP): Performed by: PHYSICIAN ASSISTANT

## 2020-04-23 PROCEDURE — 1200000002 HC SEMI PRIVATE SWING BED

## 2020-04-23 PROCEDURE — 97530 THERAPEUTIC ACTIVITIES: CPT

## 2020-04-23 PROCEDURE — 2500000003 HC RX 250 WO HCPCS: Performed by: PHYSICIAN ASSISTANT

## 2020-04-23 PROCEDURE — 97110 THERAPEUTIC EXERCISES: CPT

## 2020-04-23 PROCEDURE — 97803 MED NUTRITION INDIV SUBSEQ: CPT

## 2020-04-23 PROCEDURE — 6370000000 HC RX 637 (ALT 250 FOR IP): Performed by: INTERNAL MEDICINE

## 2020-04-23 PROCEDURE — 99308 SBSQ NF CARE LOW MDM 20: CPT | Performed by: INTERNAL MEDICINE

## 2020-04-23 RX ADMIN — NEBIVOLOL HYDROCHLORIDE 5 MG: 5 TABLET ORAL at 08:36

## 2020-04-23 RX ADMIN — ATORVASTATIN CALCIUM 10 MG: 10 TABLET, FILM COATED ORAL at 08:36

## 2020-04-23 RX ADMIN — BUMETANIDE 2 MG: 1 TABLET ORAL at 08:36

## 2020-04-23 RX ADMIN — LEVOTHYROXINE SODIUM 175 MCG: 125 TABLET ORAL at 04:57

## 2020-04-23 RX ADMIN — APIXABAN 5 MG: 5 TABLET, FILM COATED ORAL at 08:36

## 2020-04-23 RX ADMIN — PANTOPRAZOLE SODIUM 40 MG: 40 TABLET, DELAYED RELEASE ORAL at 08:36

## 2020-04-23 RX ADMIN — Medication 1 CAPSULE: at 08:36

## 2020-04-23 RX ADMIN — APIXABAN 5 MG: 5 TABLET, FILM COATED ORAL at 20:31

## 2020-04-23 RX ADMIN — INSULIN GLARGINE 12 UNITS: 100 INJECTION, SOLUTION SUBCUTANEOUS at 20:32

## 2020-04-23 RX ADMIN — Medication 1 CAPSULE: at 17:11

## 2020-04-23 RX ADMIN — FERROUS SULFATE TAB EC 324 MG (65 MG FE EQUIVALENT) 324 MG: 324 (65 FE) TABLET DELAYED RESPONSE at 08:36

## 2020-04-23 RX ADMIN — MULTIPLE VITAMINS W/ MINERALS TAB 1 TABLET: TAB at 08:36

## 2020-04-23 RX ADMIN — BUMETANIDE 2 MG: 1 TABLET ORAL at 20:31

## 2020-04-23 RX ADMIN — DOCUSATE SODIUM 100 MG: 100 CAPSULE, LIQUID FILLED ORAL at 08:36

## 2020-04-23 RX ADMIN — METOLAZONE 5 MG: 5 TABLET ORAL at 08:36

## 2020-04-23 RX ADMIN — AMIODARONE HYDROCHLORIDE 100 MG: 200 TABLET ORAL at 11:58

## 2020-04-23 RX ADMIN — SALINE NASAL SPRAY 1 SPRAY: 1.5 SOLUTION NASAL at 20:32

## 2020-04-23 RX ADMIN — ASPIRIN 81 MG: 81 TABLET, COATED ORAL at 08:36

## 2020-04-23 RX ADMIN — OXYCODONE HYDROCHLORIDE AND ACETAMINOPHEN 500 MG: 500 TABLET ORAL at 08:36

## 2020-04-23 RX ADMIN — ZINC SULFATE 220 MG (50 MG) CAPSULE 50 MG: CAPSULE at 08:36

## 2020-04-23 RX ADMIN — DOCUSATE SODIUM 100 MG: 100 CAPSULE, LIQUID FILLED ORAL at 20:31

## 2020-04-23 RX ADMIN — MICONAZOLE NITRATE: 20 POWDER TOPICAL at 21:58

## 2020-04-23 RX ADMIN — SALINE NASAL SPRAY 1 SPRAY: 1.5 SOLUTION NASAL at 08:37

## 2020-04-23 RX ADMIN — FERROUS SULFATE TAB EC 324 MG (65 MG FE EQUIVALENT) 324 MG: 324 (65 FE) TABLET DELAYED RESPONSE at 17:11

## 2020-04-23 RX ADMIN — HYDROXYZINE PAMOATE 25 MG: 25 CAPSULE ORAL at 20:31

## 2020-04-23 RX ADMIN — ISOSORBIDE MONONITRATE 30 MG: 30 TABLET, EXTENDED RELEASE ORAL at 08:36

## 2020-04-23 RX ADMIN — MICONAZOLE NITRATE: 20 POWDER TOPICAL at 08:38

## 2020-04-23 RX ADMIN — SILVER SULFADIAZINE: 10 CREAM TOPICAL at 08:37

## 2020-04-23 RX ADMIN — ZINC SULFATE 220 MG (50 MG) CAPSULE 50 MG: CAPSULE at 20:32

## 2020-04-23 ASSESSMENT — PAIN SCALES - GENERAL: PAINLEVEL_OUTOF10: 0

## 2020-04-23 NOTE — FLOWSHEET NOTE
04/23/20 0900   Assessment   Charting Type Shift assessment   Neurological   Neuro (WDL) WDL   Level of Consciousness 0   [place] Coma Scale   Eye Opening 4   Best Verbal Response 5   Best Motor Response 6   [place] Coma Scale Score 15   NIH/MNHISS Stroke Scale   NIH/MNIHSS Stroke Scale Assessed No   HEENT   HEENT (WDL) X   Voice Normal   Mucous Membrane Moist   Teeth Edentulous   Right Eye Impaired vision   Left Eye Impaired vision   Respiratory   Respiratory (WDL) X   Subcutaneous air/Crepitus None   Respiratory Pattern Regular   Respiratory Depth Normal   Respiratory Quality/Effort Unlabored;Dyspnea with exertion   Chest Assessment Chest expansion symmetrical;Trachea midline   L Breath Sounds Diminished;Clear   R Breath Sounds Diminished;Clear   Breath Sounds   Right Upper Lobe Clear   Right Middle Lobe Diminished   Right Lower Lobe Diminished   Left Upper Lobe Clear   Left Lower Lobe Diminished   Cough/Sputum   Cough Congested   Incentive Spirometry Tx   Respiratory Effort Dyspnea with Exertion   Cardiac   Cardiac (WDL) X   Cardiac Regularity Irregular   Cardiac Monitor   Telemetry Monitor On No   Gastrointestinal   Abdominal (WDL) X   RUQ Bowel Sounds Active   LUQ Bowel Sounds Active   RLQ Bowel Sounds Active   LLQ Bowel Sounds Active   Abdomen Inspection Distended;Gross ascites; Rotund;Rounded  (obese)   Peripheral Vascular   Peripheral Vascular (WDL) X   Edema Generalized;Right lower extremity; Left lower extremity   Edema Generalized +3;Pitting   RLE Edema +3;Pitting   LLE Edema +3;Pitting   RUE Neurovascular Assessment   Capillary Refill Less than/equal to 3 seconds   Color Pale   Temperature Warm   LUE Neurovascular Assessment   Capillary Refill Less than/equal to 3 seconds   Color Pale   Temperature Warm   RLE Neurovascular Assessment   Capillary Refill Less than/equal to 3 seconds   Color Red   Temperature Warm   Sensation RLE Full sensation   LLE Neurovascular Assessment   Capillary Refill Less

## 2020-04-24 LAB
ANION GAP SERPL CALCULATED.3IONS-SCNC: 12 MMOL/L (ref 3–16)
BUN BLDV-MCNC: 75 MG/DL (ref 6–20)
CALCIUM SERPL-MCNC: 9.1 MG/DL (ref 8.5–10.5)
CHLORIDE BLD-SCNC: 98 MMOL/L (ref 98–107)
CO2: 33 MMOL/L (ref 20–30)
CREAT SERPL-MCNC: 2.9 MG/DL (ref 0.4–1.2)
GFR AFRICAN AMERICAN: 20
GFR NON-AFRICAN AMERICAN: 16
GLUCOSE BLD-MCNC: 128 MG/DL (ref 74–106)
GLUCOSE BLD-MCNC: 139 MG/DL (ref 74–106)
GLUCOSE BLD-MCNC: 147 MG/DL (ref 74–106)
GLUCOSE BLD-MCNC: 168 MG/DL (ref 74–106)
GLUCOSE BLD-MCNC: 200 MG/DL (ref 74–106)
HCT VFR BLD CALC: 30.9 % (ref 37–47)
HEMOGLOBIN: 9 G/DL (ref 11.5–16.5)
MCH RBC QN AUTO: 24.4 PG (ref 27–32)
MCHC RBC AUTO-ENTMCNC: 29.1 G/DL (ref 31–35)
MCV RBC AUTO: 83.7 FL (ref 80–100)
PDW BLD-RTO: 19.4 % (ref 11–16)
PERFORMED ON: ABNORMAL
PLATELET # BLD: 182 K/UL (ref 150–400)
PMV BLD AUTO: 10.8 FL (ref 6–10)
POTASSIUM SERPL-SCNC: 4.2 MMOL/L (ref 3.4–5.1)
RBC # BLD: 3.69 M/UL (ref 3.8–5.8)
SODIUM BLD-SCNC: 143 MMOL/L (ref 136–145)
WBC # BLD: 4.8 K/UL (ref 4–11)

## 2020-04-24 PROCEDURE — 85027 COMPLETE CBC AUTOMATED: CPT

## 2020-04-24 PROCEDURE — 1200000002 HC SEMI PRIVATE SWING BED

## 2020-04-24 PROCEDURE — 36415 COLL VENOUS BLD VENIPUNCTURE: CPT

## 2020-04-24 PROCEDURE — 6370000000 HC RX 637 (ALT 250 FOR IP): Performed by: PHYSICIAN ASSISTANT

## 2020-04-24 PROCEDURE — 80048 BASIC METABOLIC PNL TOTAL CA: CPT

## 2020-04-24 PROCEDURE — 97110 THERAPEUTIC EXERCISES: CPT

## 2020-04-24 PROCEDURE — 6370000000 HC RX 637 (ALT 250 FOR IP): Performed by: INTERNAL MEDICINE

## 2020-04-24 RX ADMIN — SALINE NASAL SPRAY 1 SPRAY: 1.5 SOLUTION NASAL at 20:15

## 2020-04-24 RX ADMIN — ACETAMINOPHEN 650 MG: 325 TABLET, FILM COATED ORAL at 11:19

## 2020-04-24 RX ADMIN — INSULIN LISPRO 1 UNITS: 100 INJECTION, SOLUTION INTRAVENOUS; SUBCUTANEOUS at 20:20

## 2020-04-24 RX ADMIN — ISOSORBIDE MONONITRATE 30 MG: 30 TABLET, EXTENDED RELEASE ORAL at 08:18

## 2020-04-24 RX ADMIN — Medication 1 CAPSULE: at 08:18

## 2020-04-24 RX ADMIN — DOCUSATE SODIUM 100 MG: 100 CAPSULE, LIQUID FILLED ORAL at 20:16

## 2020-04-24 RX ADMIN — SILVER SULFADIAZINE: 10 CREAM TOPICAL at 08:19

## 2020-04-24 RX ADMIN — INSULIN LISPRO 2 UNITS: 100 INJECTION, SOLUTION INTRAVENOUS; SUBCUTANEOUS at 11:21

## 2020-04-24 RX ADMIN — MULTIPLE VITAMINS W/ MINERALS TAB 1 TABLET: TAB at 08:19

## 2020-04-24 RX ADMIN — MICONAZOLE NITRATE: 20 POWDER TOPICAL at 20:16

## 2020-04-24 RX ADMIN — NEBIVOLOL HYDROCHLORIDE 5 MG: 5 TABLET ORAL at 08:18

## 2020-04-24 RX ADMIN — LEVOTHYROXINE SODIUM 175 MCG: 125 TABLET ORAL at 05:52

## 2020-04-24 RX ADMIN — FERROUS SULFATE TAB EC 324 MG (65 MG FE EQUIVALENT) 324 MG: 324 (65 FE) TABLET DELAYED RESPONSE at 17:28

## 2020-04-24 RX ADMIN — APIXABAN 5 MG: 5 TABLET, FILM COATED ORAL at 20:15

## 2020-04-24 RX ADMIN — INSULIN GLARGINE 12 UNITS: 100 INJECTION, SOLUTION SUBCUTANEOUS at 20:21

## 2020-04-24 RX ADMIN — Medication 1 CAPSULE: at 17:28

## 2020-04-24 RX ADMIN — ATORVASTATIN CALCIUM 10 MG: 10 TABLET, FILM COATED ORAL at 08:19

## 2020-04-24 RX ADMIN — DOCUSATE SODIUM 100 MG: 100 CAPSULE, LIQUID FILLED ORAL at 08:18

## 2020-04-24 RX ADMIN — ZINC SULFATE 220 MG (50 MG) CAPSULE 50 MG: CAPSULE at 08:19

## 2020-04-24 RX ADMIN — ZINC SULFATE 220 MG (50 MG) CAPSULE 50 MG: CAPSULE at 20:16

## 2020-04-24 RX ADMIN — FERROUS SULFATE TAB EC 324 MG (65 MG FE EQUIVALENT) 324 MG: 324 (65 FE) TABLET DELAYED RESPONSE at 08:19

## 2020-04-24 RX ADMIN — ASPIRIN 81 MG: 81 TABLET, COATED ORAL at 08:19

## 2020-04-24 RX ADMIN — APIXABAN 5 MG: 5 TABLET, FILM COATED ORAL at 08:19

## 2020-04-24 RX ADMIN — BUMETANIDE 2 MG: 1 TABLET ORAL at 08:19

## 2020-04-24 RX ADMIN — MICONAZOLE NITRATE: 20 POWDER TOPICAL at 08:19

## 2020-04-24 RX ADMIN — BUMETANIDE 2 MG: 1 TABLET ORAL at 20:15

## 2020-04-24 RX ADMIN — METOLAZONE 5 MG: 5 TABLET ORAL at 08:19

## 2020-04-24 RX ADMIN — OXYCODONE HYDROCHLORIDE AND ACETAMINOPHEN 500 MG: 500 TABLET ORAL at 08:19

## 2020-04-24 RX ADMIN — PANTOPRAZOLE SODIUM 40 MG: 40 TABLET, DELAYED RELEASE ORAL at 08:18

## 2020-04-24 ASSESSMENT — PAIN SCALES - GENERAL: PAINLEVEL_OUTOF10: 4

## 2020-04-25 LAB
GLUCOSE BLD-MCNC: 139 MG/DL (ref 74–106)
GLUCOSE BLD-MCNC: 141 MG/DL (ref 74–106)
GLUCOSE BLD-MCNC: 153 MG/DL (ref 74–106)
GLUCOSE BLD-MCNC: 224 MG/DL (ref 74–106)
PERFORMED ON: ABNORMAL

## 2020-04-25 PROCEDURE — 6370000000 HC RX 637 (ALT 250 FOR IP): Performed by: INTERNAL MEDICINE

## 2020-04-25 PROCEDURE — 6370000000 HC RX 637 (ALT 250 FOR IP): Performed by: PHYSICIAN ASSISTANT

## 2020-04-25 PROCEDURE — 1200000002 HC SEMI PRIVATE SWING BED

## 2020-04-25 PROCEDURE — 6370000000 HC RX 637 (ALT 250 FOR IP)

## 2020-04-25 RX ORDER — TRIAMCINOLONE ACETONIDE 1 MG/G
CREAM TOPICAL 2 TIMES DAILY
Status: DISCONTINUED | OUTPATIENT
Start: 2020-04-25 | End: 2020-04-27 | Stop reason: HOSPADM

## 2020-04-25 RX ORDER — TRIAMCINOLONE ACETONIDE 1 MG/G
CREAM TOPICAL
Status: COMPLETED
Start: 2020-04-25 | End: 2020-04-25

## 2020-04-25 RX ADMIN — ATORVASTATIN CALCIUM 10 MG: 10 TABLET, FILM COATED ORAL at 09:38

## 2020-04-25 RX ADMIN — FERROUS SULFATE TAB EC 324 MG (65 MG FE EQUIVALENT) 324 MG: 324 (65 FE) TABLET DELAYED RESPONSE at 16:40

## 2020-04-25 RX ADMIN — METOLAZONE 5 MG: 5 TABLET ORAL at 09:38

## 2020-04-25 RX ADMIN — Medication 1 CAPSULE: at 09:51

## 2020-04-25 RX ADMIN — SALINE NASAL SPRAY 1 SPRAY: 1.5 SOLUTION NASAL at 20:50

## 2020-04-25 RX ADMIN — LEVOTHYROXINE SODIUM 175 MCG: 125 TABLET ORAL at 06:33

## 2020-04-25 RX ADMIN — TRIAMCINOLONE ACETONIDE: 1 CREAM TOPICAL at 21:01

## 2020-04-25 RX ADMIN — BUMETANIDE 2 MG: 1 TABLET ORAL at 09:38

## 2020-04-25 RX ADMIN — ISOSORBIDE MONONITRATE 30 MG: 30 TABLET, EXTENDED RELEASE ORAL at 09:37

## 2020-04-25 RX ADMIN — ASPIRIN 81 MG: 81 TABLET, COATED ORAL at 09:38

## 2020-04-25 RX ADMIN — APIXABAN 5 MG: 5 TABLET, FILM COATED ORAL at 20:47

## 2020-04-25 RX ADMIN — INSULIN GLARGINE 12 UNITS: 100 INJECTION, SOLUTION SUBCUTANEOUS at 20:57

## 2020-04-25 RX ADMIN — INSULIN LISPRO 2 UNITS: 100 INJECTION, SOLUTION INTRAVENOUS; SUBCUTANEOUS at 11:42

## 2020-04-25 RX ADMIN — MULTIPLE VITAMINS W/ MINERALS TAB 1 TABLET: TAB at 09:38

## 2020-04-25 RX ADMIN — APIXABAN 5 MG: 5 TABLET, FILM COATED ORAL at 09:38

## 2020-04-25 RX ADMIN — FERROUS SULFATE TAB EC 324 MG (65 MG FE EQUIVALENT) 324 MG: 324 (65 FE) TABLET DELAYED RESPONSE at 09:51

## 2020-04-25 RX ADMIN — SILVER SULFADIAZINE: 10 CREAM TOPICAL at 09:43

## 2020-04-25 RX ADMIN — BUMETANIDE 2 MG: 1 TABLET ORAL at 20:47

## 2020-04-25 RX ADMIN — MICONAZOLE NITRATE: 20 POWDER TOPICAL at 20:49

## 2020-04-25 RX ADMIN — MICONAZOLE NITRATE: 20 POWDER TOPICAL at 09:44

## 2020-04-25 RX ADMIN — PANTOPRAZOLE SODIUM 40 MG: 40 TABLET, DELAYED RELEASE ORAL at 09:37

## 2020-04-25 RX ADMIN — INSULIN LISPRO 1 UNITS: 100 INJECTION, SOLUTION INTRAVENOUS; SUBCUTANEOUS at 16:43

## 2020-04-25 RX ADMIN — OXYCODONE HYDROCHLORIDE AND ACETAMINOPHEN 500 MG: 500 TABLET ORAL at 09:38

## 2020-04-25 RX ADMIN — ZINC SULFATE 220 MG (50 MG) CAPSULE 50 MG: CAPSULE at 09:37

## 2020-04-25 RX ADMIN — ZINC SULFATE 220 MG (50 MG) CAPSULE 50 MG: CAPSULE at 20:47

## 2020-04-25 RX ADMIN — NEBIVOLOL HYDROCHLORIDE 5 MG: 5 TABLET ORAL at 09:38

## 2020-04-25 RX ADMIN — DOCUSATE SODIUM 100 MG: 100 CAPSULE, LIQUID FILLED ORAL at 09:38

## 2020-04-25 RX ADMIN — DOCUSATE SODIUM 100 MG: 100 CAPSULE, LIQUID FILLED ORAL at 20:47

## 2020-04-25 RX ADMIN — Medication 1 CAPSULE: at 16:40

## 2020-04-25 RX ADMIN — AMIODARONE HYDROCHLORIDE 100 MG: 200 TABLET ORAL at 09:38

## 2020-04-25 NOTE — PLAN OF CARE
Problem: Pain:  Goal: Pain level will decrease  Description: Pain level will decrease  4/24/2020 2359 by Ana Gupta LPN  Outcome: Ongoing  Goal: Control of acute pain  Description: Control of acute pain  4/24/2020 2359 by Ana Gupta LPN  Outcome: Ongoing     Problem: Falls - Risk of:  Goal: Will remain free from falls  Description: Will remain free from falls  Outcome: Ongoing     Problem: Daily Care:  Goal: Daily care needs are met  Description: Daily care needs are met  Outcome: Ongoing

## 2020-04-25 NOTE — FLOWSHEET NOTE
04/25/20 1022   Assessment   Charting Type Shift assessment   Neurological   Neuro (WDL) WDL   Level of Consciousness 0   [place] Coma Scale   Eye Opening 4   Best Verbal Response 5   Best Motor Response 6   [place] Coma Scale Score 15   HEENT   HEENT (WDL) X   Voice Normal   Mucous Membrane Moist   Teeth Edentulous   Right Eye Impaired vision   Left Eye Impaired vision   Respiratory   Respiratory (WDL) X   Subcutaneous air/Crepitus None   Respiratory Pattern Regular   Respiratory Depth Normal   Respiratory Quality/Effort Unlabored;Dyspnea with exertion   Chest Assessment Chest expansion symmetrical;Trachea midline   L Breath Sounds Clear;Diminished   R Breath Sounds Clear;Diminished   Breath Sounds   Right Upper Lobe Clear   Right Middle Lobe Clear   Right Lower Lobe Diminished   Left Upper Lobe Clear   Left Lower Lobe Diminished   Cough/Sputum   Cough Congested;Productive   Cough Description   Sputum Amount Small   Sputum Color Yellow   Tenacity Thick   Incentive Spirometry Tx   Respiratory Effort Dyspnea with Exertion   Cough and Deep Breathe   Cough and Deep Breathe Yes   Cardiac   Cardiac (WDL) X   Cardiac Regularity Irregular   Cardiac Monitor   Telemetry Monitor On No   Gastrointestinal   Abdominal (WDL) X   RUQ Bowel Sounds Active   LUQ Bowel Sounds Active   RLQ Bowel Sounds Active   LLQ Bowel Sounds Active   Abdomen Inspection Distended;Gross ascites; Rotund;Rounded  (obese)   Peripheral Vascular   Peripheral Vascular (WDL) X   Edema Generalized;Right lower extremity; Left lower extremity   Edema Generalized +3   RLE Edema +3   LLE Edema +3   RUE Neurovascular Assessment   Capillary Refill Less than/equal to 3 seconds   Color Pale   Temperature Warm   LUE Neurovascular Assessment   Capillary Refill Less than/equal to 3 seconds   Color Pale   Temperature Warm   RLE Neurovascular Assessment   Capillary Refill Less than/equal to 3 seconds   Color Red   Temperature Warm   Sensation RLE Full sensation   LLE

## 2020-04-25 NOTE — FLOWSHEET NOTE
04/24/20 2000   Assessment   Charting Type Shift assessment   Neurological   Neuro (WDL) WDL   Level of Consciousness 0   [place] Coma Scale   Eye Opening 4   Best Verbal Response 5   Best Motor Response 6   [place] Coma Scale Score 15   HEENT   HEENT (WDL) X   Voice Normal   Mucous Membrane Moist   Teeth Edentulous   Right Eye Impaired vision   Left Eye Impaired vision   Respiratory   Respiratory (WDL) X   Subcutaneous air/Crepitus None   Respiratory Pattern Regular   Respiratory Depth Normal   Respiratory Quality/Effort Unlabored;Dyspnea with exertion   Chest Assessment Chest expansion symmetrical;Trachea midline   L Breath Sounds Diminished;Clear   R Breath Sounds Diminished;Clear   Breath Sounds   Right Upper Lobe Diminished;Clear   Right Middle Lobe Diminished;Clear   Right Lower Lobe Diminished;Clear   Left Upper Lobe Diminished;Clear   Left Lower Lobe Diminished;Clear   Cough/Sputum   Cough Congested   Incentive Spirometry Tx   Respiratory Effort Dyspnea with Exertion   Cardiac   Cardiac (WDL) X   Cardiac Regularity Irregular   Cardiac Monitor   Telemetry Monitor On No   Gastrointestinal   Abdominal (WDL) X   RUQ Bowel Sounds Active   LUQ Bowel Sounds Active   RLQ Bowel Sounds Active   LLQ Bowel Sounds Active   Abdomen Inspection Distended;Gross ascites; Rotund;Rounded  (obese)   Peripheral Vascular   Peripheral Vascular (WDL) X   Edema Generalized;Right lower extremity; Left lower extremity   Edema Generalized +3;Pitting   RLE Edema +3;Pitting   LLE Edema +3;Pitting   RUE Neurovascular Assessment   Capillary Refill Less than/equal to 3 seconds   Color Pale   Temperature Warm   LUE Neurovascular Assessment   Capillary Refill Less than/equal to 3 seconds   Color Pale   Temperature Warm   RLE Neurovascular Assessment   Capillary Refill Less than/equal to 3 seconds   Color Red   Temperature Warm   Sensation RLE Full sensation   LLE Neurovascular Assessment   Capillary Refill Less than/equal to 3 seconds   Color

## 2020-04-26 LAB
GLUCOSE BLD-MCNC: 148 MG/DL (ref 74–106)
GLUCOSE BLD-MCNC: 150 MG/DL (ref 74–106)
GLUCOSE BLD-MCNC: 182 MG/DL (ref 74–106)
GLUCOSE BLD-MCNC: 210 MG/DL (ref 74–106)
PERFORMED ON: ABNORMAL

## 2020-04-26 PROCEDURE — 1200000002 HC SEMI PRIVATE SWING BED

## 2020-04-26 PROCEDURE — 2500000003 HC RX 250 WO HCPCS: Performed by: PHYSICIAN ASSISTANT

## 2020-04-26 PROCEDURE — 6370000000 HC RX 637 (ALT 250 FOR IP): Performed by: INTERNAL MEDICINE

## 2020-04-26 PROCEDURE — 6370000000 HC RX 637 (ALT 250 FOR IP): Performed by: PHYSICIAN ASSISTANT

## 2020-04-26 PROCEDURE — 6370000000 HC RX 637 (ALT 250 FOR IP): Performed by: PEDIATRICS

## 2020-04-26 RX ADMIN — INSULIN LISPRO 1 UNITS: 100 INJECTION, SOLUTION INTRAVENOUS; SUBCUTANEOUS at 16:50

## 2020-04-26 RX ADMIN — ZINC SULFATE 220 MG (50 MG) CAPSULE 50 MG: CAPSULE at 20:11

## 2020-04-26 RX ADMIN — APIXABAN 5 MG: 5 TABLET, FILM COATED ORAL at 20:11

## 2020-04-26 RX ADMIN — FERROUS SULFATE TAB EC 324 MG (65 MG FE EQUIVALENT) 324 MG: 324 (65 FE) TABLET DELAYED RESPONSE at 07:55

## 2020-04-26 RX ADMIN — METOLAZONE 5 MG: 5 TABLET ORAL at 07:55

## 2020-04-26 RX ADMIN — MICONAZOLE NITRATE: 20 POWDER TOPICAL at 08:08

## 2020-04-26 RX ADMIN — DOCUSATE SODIUM 100 MG: 100 CAPSULE, LIQUID FILLED ORAL at 20:11

## 2020-04-26 RX ADMIN — ISOSORBIDE MONONITRATE 30 MG: 30 TABLET, EXTENDED RELEASE ORAL at 07:55

## 2020-04-26 RX ADMIN — INSULIN LISPRO 1 UNITS: 100 INJECTION, SOLUTION INTRAVENOUS; SUBCUTANEOUS at 20:13

## 2020-04-26 RX ADMIN — SALINE NASAL SPRAY 1 SPRAY: 1.5 SOLUTION NASAL at 07:54

## 2020-04-26 RX ADMIN — INSULIN LISPRO 1 UNITS: 100 INJECTION, SOLUTION INTRAVENOUS; SUBCUTANEOUS at 07:58

## 2020-04-26 RX ADMIN — PANTOPRAZOLE SODIUM 40 MG: 40 TABLET, DELAYED RELEASE ORAL at 07:55

## 2020-04-26 RX ADMIN — ATORVASTATIN CALCIUM 10 MG: 10 TABLET, FILM COATED ORAL at 07:55

## 2020-04-26 RX ADMIN — LEVOTHYROXINE SODIUM 175 MCG: 125 TABLET ORAL at 05:35

## 2020-04-26 RX ADMIN — INSULIN GLARGINE 12 UNITS: 100 INJECTION, SOLUTION SUBCUTANEOUS at 20:14

## 2020-04-26 RX ADMIN — BUMETANIDE 2 MG: 1 TABLET ORAL at 20:11

## 2020-04-26 RX ADMIN — FERROUS SULFATE TAB EC 324 MG (65 MG FE EQUIVALENT) 324 MG: 324 (65 FE) TABLET DELAYED RESPONSE at 16:50

## 2020-04-26 RX ADMIN — ASPIRIN 81 MG: 81 TABLET, COATED ORAL at 07:55

## 2020-04-26 RX ADMIN — MICONAZOLE NITRATE: 20 POWDER TOPICAL at 20:12

## 2020-04-26 RX ADMIN — Medication 1 CAPSULE: at 07:55

## 2020-04-26 RX ADMIN — MULTIPLE VITAMINS W/ MINERALS TAB 1 TABLET: TAB at 07:55

## 2020-04-26 RX ADMIN — ZINC SULFATE 220 MG (50 MG) CAPSULE 50 MG: CAPSULE at 07:54

## 2020-04-26 RX ADMIN — BUMETANIDE 2 MG: 1 TABLET ORAL at 07:55

## 2020-04-26 RX ADMIN — INSULIN LISPRO 1 UNITS: 100 INJECTION, SOLUTION INTRAVENOUS; SUBCUTANEOUS at 11:27

## 2020-04-26 RX ADMIN — TRIAMCINOLONE ACETONIDE: 1 CREAM TOPICAL at 20:12

## 2020-04-26 RX ADMIN — SILVER SULFADIAZINE: 10 CREAM TOPICAL at 07:54

## 2020-04-26 RX ADMIN — APIXABAN 5 MG: 5 TABLET, FILM COATED ORAL at 07:55

## 2020-04-26 RX ADMIN — DOCUSATE SODIUM 100 MG: 100 CAPSULE, LIQUID FILLED ORAL at 07:55

## 2020-04-26 RX ADMIN — NEBIVOLOL HYDROCHLORIDE 5 MG: 5 TABLET ORAL at 07:55

## 2020-04-26 RX ADMIN — TRIAMCINOLONE ACETONIDE: 1 CREAM TOPICAL at 08:39

## 2020-04-26 RX ADMIN — OXYCODONE HYDROCHLORIDE AND ACETAMINOPHEN 500 MG: 500 TABLET ORAL at 07:55

## 2020-04-26 RX ADMIN — Medication 1 CAPSULE: at 16:50

## 2020-04-26 ASSESSMENT — PAIN SCALES - GENERAL
PAINLEVEL_OUTOF10: 0

## 2020-04-26 NOTE — FLOWSHEET NOTE
than/equal to 3 seconds   Color Red   Temperature Warm   Sensation LLE Full sensation   Skin Color/Condition   Skin Color/Condition (WDL) X   Skin Color Pale   Skin Condition/Temp Warm;Swollen;Dry   Skin Integrity   Skin Integrity (WDL) X   Skin Integrity Redness;Bruising   Location BLE   Preventative Dressing No   Skin Fold Management Yes   Dressing Site Abdominal pannus   Treatment Pharmaceutical   Assessed this shift Red;Moist   Date applied? 04/25/20   Skin Integrity Site 2   Skin Integrity Location 2 Redness   Location 2 Sacrum   Preventative Dressing No   Skin Integrity Site 3   Skin Integrity Location 3 Redness    Location 3 Under Breast   Musculoskeletal   Musculoskeletal (WDL) X   RUE Full movement   LUE Full movement   RL Extremity Limited movement;Weakness   LL Extremity Limited movement;Weakness   Genitourinary   Genitourinary (WDL) WDL   Anus/Rectum   Anus/Rectum (WDL) WDL   Psychosocial   Psychosocial (WDL) WDL   Pt awake in bed. Pt alert and oriented. Pt appears in no acute distress. Pt currently on RA. Pt lung sounds clear and diminished throughout. Pt encouraged to cough and deep breathe. Pt call bell and bedside table within reach. Will continue to monitor pt.

## 2020-04-27 VITALS
HEIGHT: 66 IN | TEMPERATURE: 97.8 F | BODY MASS INDEX: 47.09 KG/M2 | SYSTOLIC BLOOD PRESSURE: 165 MMHG | RESPIRATION RATE: 18 BRPM | HEART RATE: 101 BPM | DIASTOLIC BLOOD PRESSURE: 92 MMHG | OXYGEN SATURATION: 93 % | WEIGHT: 293 LBS

## 2020-04-27 LAB
ANION GAP SERPL CALCULATED.3IONS-SCNC: 12 MMOL/L (ref 3–16)
BUN BLDV-MCNC: 81 MG/DL (ref 6–20)
CALCIUM SERPL-MCNC: 9.4 MG/DL (ref 8.5–10.5)
CHLORIDE BLD-SCNC: 97 MMOL/L (ref 98–107)
CO2: 34 MMOL/L (ref 20–30)
CREAT SERPL-MCNC: 3 MG/DL (ref 0.4–1.2)
GFR AFRICAN AMERICAN: 19
GFR NON-AFRICAN AMERICAN: 16
GLUCOSE BLD-MCNC: 124 MG/DL (ref 74–106)
GLUCOSE BLD-MCNC: 155 MG/DL (ref 74–106)
GLUCOSE BLD-MCNC: 195 MG/DL (ref 74–106)
HCT VFR BLD CALC: 30.2 % (ref 37–47)
HEMOGLOBIN: 8.8 G/DL (ref 11.5–16.5)
MCH RBC QN AUTO: 24.4 PG (ref 27–32)
MCHC RBC AUTO-ENTMCNC: 29.1 G/DL (ref 31–35)
MCV RBC AUTO: 83.7 FL (ref 80–100)
PDW BLD-RTO: 19.1 % (ref 11–16)
PERFORMED ON: ABNORMAL
PERFORMED ON: ABNORMAL
PLATELET # BLD: 180 K/UL (ref 150–400)
PMV BLD AUTO: 10.5 FL (ref 6–10)
POTASSIUM SERPL-SCNC: 4.4 MMOL/L (ref 3.4–5.1)
RBC # BLD: 3.61 M/UL (ref 3.8–5.8)
SODIUM BLD-SCNC: 143 MMOL/L (ref 136–145)
WBC # BLD: 4.7 K/UL (ref 4–11)

## 2020-04-27 PROCEDURE — 6370000000 HC RX 637 (ALT 250 FOR IP): Performed by: PHYSICIAN ASSISTANT

## 2020-04-27 PROCEDURE — 80048 BASIC METABOLIC PNL TOTAL CA: CPT

## 2020-04-27 PROCEDURE — 6360000002 HC RX W HCPCS: Performed by: PHYSICIAN ASSISTANT

## 2020-04-27 PROCEDURE — 36415 COLL VENOUS BLD VENIPUNCTURE: CPT

## 2020-04-27 PROCEDURE — 6370000000 HC RX 637 (ALT 250 FOR IP): Performed by: INTERNAL MEDICINE

## 2020-04-27 PROCEDURE — 85027 COMPLETE CBC AUTOMATED: CPT

## 2020-04-27 RX ORDER — ISOSORBIDE MONONITRATE 30 MG/1
30 TABLET, EXTENDED RELEASE ORAL DAILY
Qty: 30 TABLET | Refills: 3 | Status: SHIPPED | OUTPATIENT
Start: 2020-04-28 | End: 2020-08-14

## 2020-04-27 RX ORDER — METOLAZONE 5 MG/1
5 TABLET ORAL DAILY
Qty: 30 TABLET | Refills: 0 | Status: SHIPPED | OUTPATIENT
Start: 2020-04-28 | End: 2020-04-28

## 2020-04-27 RX ORDER — LEVOTHYROXINE SODIUM 175 UG/1
175 TABLET ORAL DAILY
Qty: 30 TABLET | Refills: 3 | Status: SHIPPED | OUTPATIENT
Start: 2020-04-28 | End: 2020-08-14

## 2020-04-27 RX ORDER — BUMETANIDE 2 MG/1
2 TABLET ORAL 2 TIMES DAILY
Qty: 30 TABLET | Refills: 3 | Status: SHIPPED | OUTPATIENT
Start: 2020-04-27 | End: 2020-04-28

## 2020-04-27 RX ORDER — NEBIVOLOL 5 MG/1
5 TABLET ORAL DAILY
Qty: 30 TABLET | Refills: 3 | Status: SHIPPED | OUTPATIENT
Start: 2020-04-28 | End: 2021-09-23

## 2020-04-27 RX ADMIN — EPOETIN ALFA-EPBX 10000 UNITS: 10000 INJECTION, SOLUTION INTRAVENOUS; SUBCUTANEOUS at 08:07

## 2020-04-27 RX ADMIN — ISOSORBIDE MONONITRATE 30 MG: 30 TABLET, EXTENDED RELEASE ORAL at 08:08

## 2020-04-27 RX ADMIN — ACETAMINOPHEN 650 MG: 325 TABLET, FILM COATED ORAL at 05:10

## 2020-04-27 RX ADMIN — MULTIPLE VITAMINS W/ MINERALS TAB 1 TABLET: TAB at 08:07

## 2020-04-27 RX ADMIN — LEVOTHYROXINE SODIUM 175 MCG: 125 TABLET ORAL at 05:10

## 2020-04-27 RX ADMIN — METOLAZONE 5 MG: 5 TABLET ORAL at 08:08

## 2020-04-27 RX ADMIN — MICONAZOLE NITRATE: 20 POWDER TOPICAL at 08:09

## 2020-04-27 RX ADMIN — NEBIVOLOL HYDROCHLORIDE 5 MG: 5 TABLET ORAL at 08:08

## 2020-04-27 RX ADMIN — OXYCODONE HYDROCHLORIDE AND ACETAMINOPHEN 500 MG: 500 TABLET ORAL at 08:08

## 2020-04-27 RX ADMIN — ACETAMINOPHEN 650 MG: 325 TABLET, FILM COATED ORAL at 14:36

## 2020-04-27 RX ADMIN — FERROUS SULFATE TAB EC 324 MG (65 MG FE EQUIVALENT) 324 MG: 324 (65 FE) TABLET DELAYED RESPONSE at 08:08

## 2020-04-27 RX ADMIN — TRIAMCINOLONE ACETONIDE: 1 CREAM TOPICAL at 08:10

## 2020-04-27 RX ADMIN — BUMETANIDE 2 MG: 1 TABLET ORAL at 08:08

## 2020-04-27 RX ADMIN — SILVER SULFADIAZINE: 10 CREAM TOPICAL at 08:10

## 2020-04-27 RX ADMIN — ZINC SULFATE 220 MG (50 MG) CAPSULE 50 MG: CAPSULE at 08:08

## 2020-04-27 RX ADMIN — AMIODARONE HYDROCHLORIDE 100 MG: 200 TABLET ORAL at 14:37

## 2020-04-27 RX ADMIN — Medication 1 CAPSULE: at 08:08

## 2020-04-27 RX ADMIN — ASPIRIN 81 MG: 81 TABLET, COATED ORAL at 08:08

## 2020-04-27 RX ADMIN — PANTOPRAZOLE SODIUM 40 MG: 40 TABLET, DELAYED RELEASE ORAL at 08:08

## 2020-04-27 RX ADMIN — APIXABAN 5 MG: 5 TABLET, FILM COATED ORAL at 08:08

## 2020-04-27 RX ADMIN — ATORVASTATIN CALCIUM 10 MG: 10 TABLET, FILM COATED ORAL at 08:08

## 2020-04-27 ASSESSMENT — PAIN SCALES - GENERAL
PAINLEVEL_OUTOF10: 5
PAINLEVEL_OUTOF10: 4
PAINLEVEL_OUTOF10: 0

## 2020-04-27 NOTE — CARE COORDINATION
Pt was yelling in room stating she is going right now. She covered her head and refused to speak any further other than to say \"I wanna go home RIGHT NOW\". Pt states she doesn't want to go to a NH. Reinforced with pt that we are not trying to get her to go to a NH. Spoke with pt's sister, [place], on the phone. She is wanting to have pt home as well. Sister states that she spends most of her time in a [facility] and that is her \"normal\". She will be home today and wants her to come back home today as well.     Page out to [place]

## 2020-04-27 NOTE — DISCHARGE SUMMARY
100 each, Refills: 5    Associated Diagnoses: Type 2 diabetes mellitus with complication, with long-term current use [facility]       ! ! - Potential duplicate medications found. Please discuss with provider. Patient was seen and examined by Dr. Victoria Robles and plan of care reviewed. Signed:  Electronically signed by KAREN Palomares on 4/27/2020 at 2:24 PM       Thank you JOSE Carlin for the opportunity to be involved in this patient's care. If you have any questions or concerns please feel free to contact me at [phone].

## 2020-04-27 NOTE — PROGRESS NOTES
Bladder program discussed with pt- Mcrae clamped for bladder training- Will monitor- Will unclamp catheter when pt feels pressure-Pt verbalized understanding-KAREY
New order received to d/c telemetry.
Occupational Therapy  Facility/Department: [address] [place] [address] MED SURG  Daily Treatment Note  NAME: Elie Calabrese  : 1960  MRN: [id number]    Date of Service: 2020    Discharge Recommendations:  Continue to assess pending progress       Assessment   Assessment: Pt agreeable to OT services. Partial co tx with PT on this date to focus on safe ambulation in room. Pt ambulated 6 feet with RW with CGA x2. Pt declined to attempt walking on second attempt. Pt completed LB dressing with decreased level of assist with min verbal cuing. Pt tolerated UE ther ex seated in chair. Pt completed 2 sets x5 chair push ups. Pt left seated in recliner with call light in reach. Patient Diagnosis(es): There were no encounter diagnoses. has a past medical history of Acute metabolic encephalopathy, Acute renal failure (ARF) (HCC), Anemia, Chronic venous insufficiency, Dysphagia, GERD (gastroesophageal reflux disease), Headache(784.0), Hypertension, Hypothyroidism, Knee pain, Thrombocytopenia ([facility] Type II or unspecified type diabetes mellitus without mention of complication, not stated as uncontrolled, and Vitamin B12 deficiency. has a past surgical history that includes Eye surgery; Leg Surgery; pr egd transoral biopsy single/multiple (N/A, 10/5/2018); and pr colonoscopy flx dx w/collj spec when pfrmd (N/A, 10/5/2018).     Restrictions  Restrictions/Precautions  Restrictions/Precautions: Fall Risk, General Precautions  Required Braces or Orthoses?: No  Subjective   General  Chart Reviewed: Yes  Patient assessed for rehabilitation services?: Yes  Family / Caregiver Present: No  Referring Practitioner: KAREN Dooley  Diagnosis: COPD, generalized weakness,   Subjective  Subjective: Pt transitioned to [facility] program      Orientation     Objective    ADL  LE Dressing: Minimal assistance        Functional Mobility  Functional - Mobility Device: Rolling Walker  Activity: Other  Assist Level: Contact guard
Occupational Therapy  Facility/Department: [address] [place] [address] MED SURG  Daily Treatment Note  NAME: Dena Khalil  : 1960  MRN: [id number]    Date of Service: 2020    Discharge Recommendations:  Continue to assess pending progress       Assessment   Assessment: Pt seen for OT tx. Pt agreeable to complete UE ther ex while seated in chair. pt tolerated BUE ther ex without difficulty. Attempted ADL retraining including toileting and dressing. Pt declined activity. Pt states, \"I can do all that just fine by myself I don't need to work on that, I just had all that fluid and I couldn't do it. \" OT educated pt on importance of increased participation in ADL's during therapy session to improve independence and safety. Pt has had limited participation since evalulation. Patient Diagnosis(es): There were no encounter diagnoses. has a past medical history of Acute metabolic encephalopathy, Acute renal failure (ARF) ([facility]), Anemia, Chronic venous insufficiency, Dysphagia, GERD (gastroesophageal reflux disease), Headache(784.0), Hypertension, Hypothyroidism, Knee pain, Thrombocytopenia ([facility] [address] Type II or unspecified type diabetes mellitus without mention of complication, not stated as uncontrolled, and Vitamin B12 deficiency. has a past surgical history that includes Eye surgery; Leg Surgery; pr egd transoral biopsy single/multiple (N/A, 10/5/2018); and pr colonoscopy flx dx w/collj spec when pfrmd (N/A, 10/5/2018).     Restrictions  Restrictions/Precautions  Restrictions/Precautions: Fall Risk, General Precautions  Required Braces or Orthoses?: No  Subjective   General  Chart Reviewed: Yes  Patient assessed for rehabilitation services?: Yes  Family / Caregiver Present: No  Referring Practitioner: KAREN Menjivar  Diagnosis: COPD, generalized weakness,   Subjective  Subjective: Pt transitioned to [facility] program      Orientation     Objective          Type of ROM/Therapeutic Exercise  Type of ROM/Therapeutic Exercise:
Physical Therapy  Facility/Department: [facility] MED SURG  Daily Treatment Note  NAME: Dena Khalil  : 1960  MRN: [id number]    Date of Service: 2020    Discharge Recommendations:  Continue to assess pending progress        Assessment   Body structures, Functions, Activity limitations: Decreased functional mobility ; Decreased high-level IADLs;Decreased ROM; Decreased strength  Assessment: Pt transferred with greater ease today and tolerated therex with less complaints of pain. Pt also was able to ambulate a short distance in the room x CGA; pt reports having fatigue after ambulation. Treatment Diagnosis: functional decline  Prognosis: Good  Decision Making: Low Complexity  REQUIRES PT FOLLOW UP: Yes  Activity Tolerance  Activity Tolerance: Patient Tolerated treatment well;Patient limited by fatigue;Patient limited by pain     Patient Diagnosis(es): There were no encounter diagnoses. has a past medical history of Acute metabolic encephalopathy, Acute renal failure (ARF) (HCC), Anemia, Chronic venous insufficiency, Dysphagia, GERD (gastroesophageal reflux disease), Headache(784.0), Hypertension, Hypothyroidism, Knee pain, Thrombocytopenia ([address] Type II or unspecified type diabetes mellitus without mention of complication, not stated as uncontrolled, and Vitamin B12 deficiency. has a past surgical history that includes Eye surgery; Leg Surgery; pr egd transoral biopsy single/multiple (N/A, 10/5/2018); and pr colonoscopy flx dx w/collj spec when pfrmd (N/A, 10/5/2018). Restrictions  Restrictions/Precautions  Restrictions/Precautions: Fall Risk, General Precautions  Required Braces or Orthoses?: No     Subjective   General  Chart Reviewed: Yes  Family / Caregiver Present: No  Referring Practitioner: Lavern Varma PA-C  Subjective  Subjective: Pt presents supine in bed, pt is agreeable to PT treatment. Pt states she feels better today compared to yesterday.    Pain Screening  Patient Currently in Pain:
Verbalizes dc instructions, iv dcd, transport by ems to home, nad noted vss.
3-5  Times per day: Daily  Plan weeks: 1-2  Current Treatment Recommendations: Strengthening, Functional Mobility Training, Endurance Training, Self-Care / ADL, Safety Education & Training, Balance Training, Patient/Caregiver Education & Training       Goals  Short term goals  Time Frame for Short term goals: 2 weeks  Short term goal 1: Pt to complete toileting with MOD I demo good safety awareness. Short term goal 2: pt to complete UB/LB dressing using AE as needed with MOD I. Short term goal 3: pt to tolerate x20 minutes of activity to increase functional activity tolerance. Short term goal 4: Pt to complete sponge bathing with GUERRIER. Short term goal 5: Pt to complete hygiene/grooming with MOD I. Therapy Time   Individual Concurrent Group Co-treatment   Time In 1059         Time Out 1110         Minutes 11              This note serves as a DC summary in the event of pt discharge.      Dedra Schumacher, OTR/L
Exercise: Resistive Bands;AROM  Comment: orange  Exercises  Scapular Protraction: x15  Scapular Retraction: x15  Shoulder Depression: x15  Shoulder Elevation: x15  Shoulder Flexion: x15  Shoulder Extension: x15  Horizontal ABduction: x15  Horizontal ADduction: x15  Elbow Flexion: x15  Elbow Extension: x15     Plan   Plan  Times per week: 3-5  Times per day: Daily  Plan weeks: 1-2  Current Treatment Recommendations: Strengthening, Functional Mobility Training, Endurance Training, Self-Care / ADL, Safety Education & Training, Balance Training, Patient/Caregiver Education & Training    Goals  Short term goals  Time Frame for Short term goals: 2 weeks  Short term goal 1: Pt to complete toileting with MOD I demo good safety awareness. Short term goal 2: pt to complete UB/LB dressing using AE as needed with MOD I. Short term goal 3: pt to tolerate x20 minutes of activity to increase functional activity tolerance. Short term goal 4: Pt to complete sponge bathing with GUERRIER. Short term goal 5: Pt to complete hygiene/grooming with MOD I. Therapy Time   Individual Concurrent Group Co-treatment   Time In 0106         Time Out 0149         Minutes 43              This note serves as a DC summary in the event of pt discharge.      Dedra Schumacher, OTR/L
Objective                  Exercises  Quad Sets: x20 B  Heelslides: x30 B  Hip Abduction: x20 supine  Ankle Pumps: supine x30                           Goals  Short term goals  Time Frame for Short term goals: 1 week  Short term goal 1: Pt to be able to move supine to sit x Mod I  Short term goal 2: Pt to transfer bed to chair x Mod I  Short term goal 3: Pt to sit EOB with good balance. Short term goal 4: Pt to ambulate 10 feet in room.     Plan    Plan  Times per week: 4-5 days per week  Plan weeks: 1  Current Treatment Recommendations: Strengthening, Gait Training, Patient/Caregiver Education & Training, ROM, Equipment Evaluation, Education, & procurement, Balance Training, Functional Mobility Training, Safety Education & Training, Transfer Training  Safety Devices  Type of devices: Left in chair(left with OT for further treatment)     Therapy Time   Individual Concurrent Group Co-treatment   Time In          Time Out 0943         Minutes 12                 Tracie Fallon, [address] [facility]
Yes(IV in L hand)  Social/Functional History  Social/Functional History  Lives With: (lives with sister)  Type of Home: House  Home Layout: One level(step down to bathroom)  Home Access: Level entry  Bathroom Shower/Tub: (Sponge bath)  Bathroom Toilet: (uses BSC )  Home Equipment: Rolling walker, Wheelchair-manual, Standard walker, Hospital bed  Receives Help From: Family, Home health(HHS therapy and nursing)  ADL Assistance: Independent  Homemaking Assistance: Independent(light cooking tasks on electric skillet)  Ambulation Assistance: Needs assistance(walks short distances with walker, uses WC primarily for mobility tasks. )  Transfer Assistance: Independent  Active : No  Patient's  Info: brother drives her and runs errands for her       Objective   Vision: Impaired  Vision Exceptions: Wears glasses for reading  Hearing: Within functional limits    Orientation  Overall Orientation Status: Within Functional Limits  Observation/Palpation  Observation: morbid obesity; B LE edema with erythema. Scratch noted on anterior mid L lower leg, 3L 02  Balance  Sitting Balance: Stand by assistance  Standing Balance: Stand by assistance  ADL  LE Dressing: Maximum assistance  Tone RUE  RUE Tone: Normotonic  Tone LUE  LUE Tone: Normotonic  Coordination  Movements Are Fluid And Coordinated: Yes     Bed mobility  Supine to Sit: Minimal assistance;2 Person assistance  Scooting: Contact guard assistance  Transfers  Stand Pivot Transfers: Contact guard assistance;2 Person assistance  Sit to stand: Contact guard assistance;2 Person assistance     Cognition  Cognition Comment: able to follow commands appropriately, decreased safety awareness noted.          Sensation  Overall Sensation Status: WFL        LUE AROM (degrees)  LUE AROM : WFL  RUE AROM (degrees)  RUE AROM : WFL  LUE Strength  L Shoulder Flex: 4-/5  L Elbow Flex: 4/5  L Elbow Ext: 4/5  L Hand General: 4/5  RUE Strength  R Shoulder Flex: 4-/5  R Elbow Flex: 4/5  R
agreeable to PT treatment including trying to walk more today. Orientation  Orientation  Overall Orientation Status: Within Normal Limits    Objective   Bed mobility  Rolling to Right: Minimal assistance;Contact guard assistance  Supine to Sit: Minimal assistance  Scooting: Contact guard assistance     Transfers  Sit to Stand: Contact guard assistance  Stand to sit: Contact guard assistance  Bed to Chair: Contact guard assistance  Stand Pivot Transfers: Contact guard assistance     Ambulation  Ambulation?: Yes  Ambulation 1  Surface: level tile  Device: Rolling Walker  Assistance: Contact guard assistance  Gait Deviations: Slow Zandra;Decreased step length;Decreased step height  Distance: 6 feet, rest break then 10 feet        Exercises  Hip Flexion: seated x 15 each  Knee Long Arc Quad: x20 each  Ankle Pumps: x30                         Goals  Short term goals  Time Frame for Short term goals: 1 week  Short term goal 1: Pt to be able to move supine to sit x Mod I  Short term goal 2: Pt to transfer bed to chair x Mod I  Short term goal 3: Pt to sit EOB with good balance. Short term goal 4: Pt to ambulate 10 feet in room. Plan    Plan  Times per week: 4-5 days per week  Plan weeks: 1  Current Treatment Recommendations: Strengthening, Gait Training, Patient/Caregiver Education & Training, ROM, Equipment Evaluation, Education, & procurement, Balance Training, Functional Mobility Training, Safety Education & Training, Transfer Training  Safety Devices  Type of devices: Left in chair, Call light within reach     Therapy Time   Individual Concurrent Group Co-treatment   Time In 1240         Time Out 1310         Minutes Flakita Richardson 96., PT     This note serves as D/C summary if patient is discharged prior to next visit.
safe weight loss    · Monitoring: Meal Intake, Supplement Intake, Skin Integrity, I&O, Weight, Pertinent Labs      Electronically signed by Tori Izquierdo on 4/21/20 at 10:39 AM EDT
states her L knee is stiff and sore)  Vital Signs  Patient Currently in Pain: (pt states her L knee is stiff and sore)       Orientation  Orientation  Overall Orientation Status: Within Normal Limits    Objective   Bed mobility  Supine to Sit: Moderate assistance;2 Person assistance  Scooting: Contact guard assistance     Transfers  Sit to Stand: Contact guard assistance  Stand to sit: Contact guard assistance  Bed to Chair: Contact guard assistance;2 Person Assistance  Stand Pivot Transfers: 2 Person Assistance;Contact guard assistance     Ambulation  Ambulation?: (stand pivot only performed with small steps)        Exercises  Hip Flexion: seated x 15 each  Hip Abduction: x15 B sitting isometric  Knee Long Arc Quad: x10  Ankle Pumps: sitting heel / toe raises x 20, ankle pumps in knee ext x 10 B  Comments: sit to stand x 2 trial during session                       Goals  Short term goals  Time Frame for Short term goals: 1 week  Short term goal 1: Pt to be able to move supine to sit x Mod I  Short term goal 2: Pt to transfer bed to chair x Mod I  Short term goal 3: Pt to sit EOB with good balance. Short term goal 4: Pt to ambulate 10 feet in room. Plan    Plan  Times per week: 4-5 days per week  Plan weeks: 1  Current Treatment Recommendations: Strengthening, Gait Training, Patient/Caregiver Education & Training, ROM, Equipment Evaluation, Education, & procurement, Balance Training, Functional Mobility Training, Safety Education & Training, Transfer Training  Safety Devices  Type of devices: Call light within reach, Left in chair     Therapy Time   Individual Concurrent Group Co-treatment   Time In 1622         Time Out 1648         Minutes [facility],        This note serves as D/C summary if patient is discharged prior to next visit.
Gait Training, Patient/Caregiver Education & Training, ROM, Equipment Evaluation, Education, & procurement, Balance Training, Functional Mobility Training, Safety Education & Training, Transfer Training  Safety Devices  Type of devices: Call light within reach, Left in chair      Goals  Short term goals  Time Frame for Short term goals: 1 week  Short term goal 1: Pt to be able to move supine to sit x Mod I  Short term goal 2: Pt to transfer bed to chair x Mod I  Short term goal 3: Pt to sit EOB with good balance. Short term goal 4: Pt to ambulate 10 feet in room. Therapy Time   Individual Concurrent Group Co-treatment   Time In 1405         Time Out 1430         Minutes 25                 Kemal Mansfield PT       Certification of Medical Necessity: It will be understood that this treatment plan is certified medically necessary by the documenting therapist and referring physician mentioned in this report. Unless the physician indicated otherwise through written correspondence with our office, all further referrals will act as certification of medical necessity on this treatment plan. Thank you for this referral.  If you have questions regarding this plan of care, please call [phone].           Revisions to this plan (optional):                     Please sign and return this plan to:   FAX: [phone]      Signature:                                 Date:
to sleep apnea. Overnight pulse ox performed and she needs supplemental o2 qhs. Recommend sleep study as outpatient. 04/23/2020    Anasarca [R60.1] 04/13/2020    Acute on chronic heart failure (HCC) [I50.9]  Clinically improving. Per  was receiving bumex 2 mg q8h and metolazone 5 mg po daily. Transitioned to oral diuretic regimen on 4/22. Weight has trended down to 352. Continue to monitor and adjust regimen as needed. Low na diet with fluid restriction. 04/13/2020    Chronic kidney disease (CKD), stage 4  Baseline Cr around 3 per chart review. Continue to monitor. Avoid nephrotoxic agents as able. Follows with dr Meena Muro 04/13/2020    UTI (urinary tract infection) [N39.0]  Completed course of IV Merrem on 4/20 for Louanna Satya and ESBL Ecoli UTI.  04/13/2020    Atrial fibrillation ([facility] [address] [I48.91]  Continue amiodarone, eliquis, bystolic.  50/73/3174    Morbid obesity ([facility] [address] [E66.01]  Encourage weight loss. Dietitian following. Diuresing as above in setting of anasarca. Complicates all aspects of care. 02/05/2019    Hyperkalemia [E87.5]  Continue low K diet. Monitor. 01/09/2019         Anemia [D64.9]  History of iron deficiency and anemia of chronic disease. Started on procrit. Received venofer after repeat iron studies. Continue oral iron. On gi ppx. Monitor. 10/02/2018    Type 2 diabetes mellitus with complication (HCC) [I32.0]  Glucose adequately controlled on current regimen. Continue to monitor. Diabetic diet encouraged.  Hypothyroidism [E03.9]  TSH 11.68 on 4/11/2020. Continue Synthroid      Patient was seen and examined by Dr. Tucker Ng and plan of care reviewed.     Electronically signed by AKREN Peterson on 4/23/2020 at 10:39 AM

## 2020-04-27 NOTE — CARE COORDINATION
Attempted tx. Pt agitated and screaming. Pt states she wants to go home. Discussed with  and pt continued to refuse therapy.

## 2020-04-28 ENCOUNTER — CARE COORDINATION (OUTPATIENT)
Dept: CASE MANAGEMENT | Age: 60
End: 2020-04-28

## 2020-04-28 NOTE — CARE COORDINATION
4/28/20- Called to discuss [place] with pt as she refused it yesterday, but needs it. Spoke with sister on the phone today. Pt and family are agreeable to [place] at this point. Orders sent to [facility] (Pt's provider). No further DC needs. Pt is doing well at home per sister.

## 2020-04-29 ENCOUNTER — CARE COORDINATION (OUTPATIENT)
Dept: CARE COORDINATION | Age: 60
End: 2020-04-29

## 2020-04-30 ENCOUNTER — VIRTUAL VISIT (OUTPATIENT)
Dept: PRIMARY CARE CLINIC | Age: 60
End: 2020-04-30
Payer: MEDICARE

## 2020-04-30 PROCEDURE — G2025 DIS SITE TELE SVCS RHC/FQHC: HCPCS | Performed by: NURSE PRACTITIONER

## 2020-05-07 ENCOUNTER — CARE COORDINATION (OUTPATIENT)
Dept: CARE COORDINATION | Age: 60
End: 2020-05-07

## 2020-05-07 RX ORDER — MULTIVIT-MIN/IRON FUM/FOLIC AC 7.5 MG-4
1 TABLET ORAL DAILY
Qty: 30 TABLET | Refills: 5 | Status: SHIPPED | OUTPATIENT
Start: 2020-05-07 | End: 2020-10-09

## 2020-05-07 NOTE — CARE COORDINATION
ACC: Yolonda Schwab, RN CM Risk Score: 7  [place] 10 Year Mortality Risk Score: 98%       Rebeca Jennings reports today that she does not have any swelling. She denies any issues with breathing or fluid overload. Rebeca Jennings is not able to weigh due to fall precaution. We reviewed her medications and she had questions about a few. We verified that even though she has 2 asa bottles she is to only take one 81 mg tablet daily. She states she did not get her multivitamin. Refill request was sent. She asked what docusate sodium is for. Contact information was reviewed.

## 2020-05-15 PROBLEM — N39.0 UTI (URINARY TRACT INFECTION): Status: RESOLVED | Noted: 2020-04-13 | Resolved: 2020-05-15

## 2020-05-19 ENCOUNTER — TELEPHONE (OUTPATIENT)
Dept: PRIMARY CARE CLINIC | Age: 60
End: 2020-05-19

## 2020-05-19 ASSESSMENT — ENCOUNTER SYMPTOMS
SORE THROAT: 0
NAUSEA: 0
VOMITING: 0
EYE PAIN: 0
ABDOMINAL PAIN: 0
COUGH: 0
SHORTNESS OF BREATH: 0

## 2020-05-19 NOTE — ED NOTES
Dr Gianni Smyth on phone with Dr Henrry Kirkland concerning admission.      Shahana Chavarria, RICHARD  04/16/19 5043 EKG/Imaging Studies/Labs

## 2020-05-19 NOTE — PROGRESS NOTES
apply route daily  JOSE Wilhelm   simvastatin (ZOCOR) 20 MG tablet Take 1 tablet by mouth nightly  JOSE Wilhelm   ferrous sulfate 325 (65 Fe) MG tablet Take 1 tablet by mouth 2 times daily (with meals)  JOSE Wilhelm   arginine 500 MG tablet Take 1 tablet by mouth daily  JOSE Wilhelm   ammonium lactate (LAC-HYDRIN) 12 % lotion Apply topically daily. JOSE Wilhelm   blood glucose monitor strips Test daily & as needed for symptoms of irregular blood glucose. Dx E11.9 Freestyle Lite  JOSE Wilhelm   glucose monitoring kit (FREESTYLE) monitoring kit 1 kit by Does not apply route daily as needed (elevated glucose) Dx e 11.9  JOSE Wilhelm   Lancets MISC 1 each by Does not apply route 2 times daily  JOSE Wilhelm   apixaban (ELIQUIS) 5 MG TABS tablet Take 5 mg by mouth 2 times daily  Historical Provider, MD   aspirin 81 MG chewable tablet Take 81 mg by mouth daily  Historical Provider, MD   Skin Protectants, Misc. (DIMETHICONE-ZINC OXIDE) cream Apply 1 Dose topically as needed for Dry Skin (for redness) Apply topically 2 times daily as needed. Historical Provider, MD   docusate sodium (COLACE) 100 MG capsule Take 100 mg by mouth 2 times daily  Historical Provider, MD   polyethylene glycol (MIRALAX) powder Take 17 g by mouth 2 times daily  Historical Provider, MD JULIAN VITAMIN C 500 MG tablet TAKE 1 TABLET BY MOUTH DAILY  JOSE Wilhelm   blood glucose test strips (FREESTYLE TEST STRIPS) strip Daily dx:250.00  JOSE Wilhelm       ASSESSMENT:  1. Congestive heart failure, unspecified HF chronicity, unspecified heart failure type ([address]    2. Stage 3 chronic kidney disease ([address]    3. Anemia due to chronic kidney disease, unspecified CKD stage    4. Type 2 diabetes mellitus with complication (HCC)        PLAN:  Continue current meds. Will see about getting Procrit instructions to her home health nurse. Return in about 3 months (around 7/30/2020).     I

## 2020-05-19 NOTE — TELEPHONE ENCOUNTER
----- Message from JOSE Tan sent at 5/19/2020  1:08 AM EDT -----  See 3/10 note about Procrit and give instructions to her nurse with [facility].

## 2020-05-22 ENCOUNTER — TELEPHONE (OUTPATIENT)
Dept: PRIMARY CARE CLINIC | Age: 60
End: 2020-05-22

## 2020-05-22 NOTE — TELEPHONE ENCOUNTER
Mehnaz nurse with [facility] [phone] called requesting orders regarding labs, Procrit, and julio cesar boots and suggests Alginate (under julio cesar boots) for open wounds on the legs

## 2020-05-29 ENCOUNTER — HOSPITAL ENCOUNTER (OUTPATIENT)
Facility: HOSPITAL | Age: 60
Discharge: HOME | End: 2020-05-29
Payer: MEDICARE

## 2020-05-29 LAB
BASOPHILS ABSOLUTE: 0 K/UL (ref 0–0.1)
BASOPHILS RELATIVE PERCENT: 0.9 %
EOSINOPHILS ABSOLUTE: 0.2 K/UL (ref 0–0.4)
EOSINOPHILS RELATIVE PERCENT: 3.7 %
HCT VFR BLD CALC: 30.4 % (ref 37–47)
HEMOGLOBIN: 9.2 G/DL (ref 11.5–16.5)
IMMATURE GRANULOCYTES #: 0 K/UL
IMMATURE GRANULOCYTES %: 0.2 % (ref 0–5)
LYMPHOCYTES ABSOLUTE: 0.8 K/UL (ref 1.5–4)
LYMPHOCYTES RELATIVE PERCENT: 17.5 %
MCH RBC QN AUTO: 23.6 PG (ref 27–32)
MCHC RBC AUTO-ENTMCNC: 30.3 G/DL (ref 31–35)
MCV RBC AUTO: 77.9 FL (ref 80–100)
MONOCYTES ABSOLUTE: 0.4 K/UL (ref 0.2–0.8)
MONOCYTES RELATIVE PERCENT: 9.5 %
NEUTROPHILS ABSOLUTE: 3.2 K/UL (ref 2–7.5)
NEUTROPHILS RELATIVE PERCENT: 68.2 %
PDW BLD-RTO: 20.9 % (ref 11–16)
PLATELET # BLD: 149 K/UL (ref 150–400)
PMV BLD AUTO: 10.3 FL (ref 6–10)
RBC # BLD: 3.9 M/UL (ref 3.8–5.8)
WBC # BLD: 4.6 K/UL (ref 4–11)

## 2020-05-29 PROCEDURE — 85025 COMPLETE CBC W/AUTO DIFF WBC: CPT

## 2020-05-29 PROCEDURE — 36415 COLL VENOUS BLD VENIPUNCTURE: CPT

## 2020-06-04 RX ORDER — METOLAZONE 5 MG/1
5 TABLET ORAL DAILY
Qty: 90 TABLET | Refills: 3 | Status: SHIPPED | OUTPATIENT
Start: 2020-06-04 | End: 2021-08-31

## 2020-06-04 NOTE — TELEPHONE ENCOUNTER
Patient called requesting refills. I think she already has them on the metoloazone but the pharmacy can't understand what she is telling them. She said she has been out of her eliquis also.

## 2020-06-08 ENCOUNTER — TELEPHONE (OUTPATIENT)
Dept: PRIMARY CARE CLINIC | Age: 60
End: 2020-06-08

## 2020-06-15 RX ORDER — ALBUTEROL SULFATE 90 UG/1
2 AEROSOL, METERED RESPIRATORY (INHALATION) EVERY 4 HOURS PRN
Qty: 8.5 G | Refills: 3 | Status: SHIPPED | OUTPATIENT
Start: 2020-06-15 | End: 2020-09-09

## 2020-06-17 ENCOUNTER — TELEPHONE (OUTPATIENT)
Dept: PRIMARY CARE CLINIC | Age: 60
End: 2020-06-17

## 2020-06-17 RX ORDER — LOPERAMIDE HYDROCHLORIDE 2 MG/1
2 CAPSULE ORAL 4 TIMES DAILY PRN
Qty: 30 CAPSULE | Refills: 5 | Status: SHIPPED | OUTPATIENT
Start: 2020-06-17 | End: 2020-09-09

## 2020-06-18 ENCOUNTER — TELEPHONE (OUTPATIENT)
Dept: PRIMARY CARE CLINIC | Age: 60
End: 2020-06-18

## 2020-06-22 ENCOUNTER — TELEPHONE (OUTPATIENT)
Dept: PRIMARY CARE CLINIC | Age: 60
End: 2020-06-22

## 2020-06-22 RX ORDER — ASCORBIC ACID 500 MG
500 TABLET ORAL DAILY
Qty: 30 TABLET | Refills: 5 | Status: SHIPPED | OUTPATIENT
Start: 2020-06-22 | End: 2020-11-19 | Stop reason: SDUPTHER

## 2020-06-23 ENCOUNTER — HOSPITAL ENCOUNTER (OUTPATIENT)
Facility: HOSPITAL | Age: 60
Discharge: HOME | End: 2020-06-23
Payer: MEDICARE

## 2020-06-23 LAB
BASOPHILS ABSOLUTE: 0 K/UL (ref 0–0.1)
BASOPHILS RELATIVE PERCENT: 0.7 %
EOSINOPHILS ABSOLUTE: 0.1 K/UL (ref 0–0.4)
EOSINOPHILS RELATIVE PERCENT: 2.4 %
HCT VFR BLD CALC: 32.9 % (ref 37–47)
HEMOGLOBIN: 9.8 G/DL (ref 11.5–16.5)
IMMATURE GRANULOCYTES #: 0 K/UL
IMMATURE GRANULOCYTES %: 0.4 % (ref 0–5)
LYMPHOCYTES ABSOLUTE: 0.9 K/UL (ref 1.5–4)
LYMPHOCYTES RELATIVE PERCENT: 16.3 %
MCH RBC QN AUTO: 23.6 PG (ref 27–32)
MCHC RBC AUTO-ENTMCNC: 29.8 G/DL (ref 31–35)
MCV RBC AUTO: 79.3 FL (ref 80–100)
MONOCYTES ABSOLUTE: 0.5 K/UL (ref 0.2–0.8)
MONOCYTES RELATIVE PERCENT: 8.4 %
NEUTROPHILS ABSOLUTE: 3.8 K/UL (ref 2–7.5)
NEUTROPHILS RELATIVE PERCENT: 71.8 %
PDW BLD-RTO: 21.6 % (ref 11–16)
PLATELET # BLD: 172 K/UL (ref 150–400)
PMV BLD AUTO: 10.7 FL (ref 6–10)
RBC # BLD: 4.15 M/UL (ref 3.8–5.8)
WBC # BLD: 5.4 K/UL (ref 4–11)

## 2020-06-23 PROCEDURE — 85025 COMPLETE CBC W/AUTO DIFF WBC: CPT

## 2020-06-23 RX ORDER — SIMVASTATIN 20 MG
TABLET ORAL
Qty: 30 TABLET | Refills: 5 | Status: SHIPPED | OUTPATIENT
Start: 2020-06-23 | End: 2020-12-04

## 2020-07-20 RX ORDER — PANTOPRAZOLE SODIUM 40 MG/1
40 TABLET, DELAYED RELEASE ORAL DAILY
Qty: 30 TABLET | Refills: 5 | Status: SHIPPED | OUTPATIENT
Start: 2020-07-20 | End: 2020-11-09

## 2020-08-14 RX ORDER — LEVOTHYROXINE SODIUM 175 UG/1
175 TABLET ORAL DAILY
Qty: 30 TABLET | Refills: 3 | Status: SHIPPED | OUTPATIENT
Start: 2020-08-14 | End: 2020-11-24

## 2020-08-14 RX ORDER — ISOSORBIDE MONONITRATE 30 MG/1
30 TABLET, EXTENDED RELEASE ORAL DAILY
Qty: 30 TABLET | Refills: 3 | Status: SHIPPED | OUTPATIENT
Start: 2020-08-14 | End: 2020-11-24

## 2020-08-25 RX ORDER — BUMETANIDE 2 MG/1
TABLET ORAL
Qty: 30 TABLET | Refills: 0 | Status: SHIPPED | OUTPATIENT
Start: 2020-08-25 | End: 2020-09-30

## 2020-09-16 RX ORDER — ARGININE 500 MG
500 TABLET ORAL DAILY
Qty: 30 TABLET | Refills: 5 | Status: SHIPPED | OUTPATIENT
Start: 2020-09-16 | End: 2021-02-28

## 2020-10-16 RX ORDER — AMIODARONE HYDROCHLORIDE 100 MG/1
TABLET ORAL
Qty: 30 TABLET | Refills: 5 | Status: SHIPPED | OUTPATIENT
Start: 2020-10-16 | End: 2021-04-07

## 2020-10-28 ENCOUNTER — TELEPHONE (OUTPATIENT)
Dept: PRIMARY CARE CLINIC | Age: 60
End: 2020-10-28

## 2020-11-19 RX ORDER — ASCORBIC ACID 500 MG
500 TABLET ORAL DAILY
Qty: 30 TABLET | Refills: 5 | Status: SHIPPED | OUTPATIENT
Start: 2020-11-19 | End: 2021-05-17

## 2020-11-24 RX ORDER — ISOSORBIDE MONONITRATE 30 MG/1
30 TABLET, EXTENDED RELEASE ORAL DAILY
Qty: 30 TABLET | Refills: 3 | Status: SHIPPED | OUTPATIENT
Start: 2020-11-24 | End: 2021-01-03

## 2020-11-24 RX ORDER — LEVOTHYROXINE SODIUM 175 UG/1
175 TABLET ORAL DAILY
Qty: 30 TABLET | Refills: 3 | Status: SHIPPED | OUTPATIENT
Start: 2020-11-24 | End: 2021-01-03

## 2021-01-03 RX ORDER — MULTIVITAMIN,THERAPEUTIC
TABLET ORAL
Qty: 90 TABLET | Refills: 1 | Status: SHIPPED | OUTPATIENT
Start: 2021-01-03 | End: 2021-03-30

## 2021-01-03 RX ORDER — LEVOTHYROXINE SODIUM 175 UG/1
175 TABLET ORAL DAILY
Qty: 30 TABLET | Refills: 3 | Status: SHIPPED | OUTPATIENT
Start: 2021-01-03 | End: 2021-08-31

## 2021-01-03 RX ORDER — ISOSORBIDE MONONITRATE 30 MG/1
30 TABLET, EXTENDED RELEASE ORAL DAILY
Qty: 30 TABLET | Refills: 3 | Status: SHIPPED | OUTPATIENT
Start: 2021-01-03 | End: 2021-09-13

## 2021-02-21 RX ORDER — SIMVASTATIN 20 MG
TABLET ORAL
Qty: 30 TABLET | Refills: 5 | Status: SHIPPED | OUTPATIENT
Start: 2021-02-21 | End: 2021-09-08 | Stop reason: SDUPTHER

## 2021-02-22 RX ORDER — MECLIZINE HYDROCHLORIDE 25 MG/1
TABLET ORAL
Qty: 30 TABLET | Refills: 5 | Status: SHIPPED | OUTPATIENT
Start: 2021-02-22 | End: 2021-09-08 | Stop reason: SDUPTHER

## 2021-02-28 RX ORDER — ARGININE 500 MG
500 TABLET ORAL DAILY
Qty: 30 TABLET | Refills: 5 | Status: SHIPPED | OUTPATIENT
Start: 2021-02-28 | End: 2021-05-17

## 2021-03-12 RX ORDER — PEN NEEDLE, DIABETIC 32GX 5/32"
NEEDLE, DISPOSABLE MISCELLANEOUS
Qty: 100 EACH | Refills: 5 | Status: SHIPPED | OUTPATIENT
Start: 2021-03-12 | End: 2021-08-27

## 2021-03-12 RX ORDER — AMMONIUM LACTATE 12 G/100G
LOTION TOPICAL
Qty: 400 G | Refills: 5 | Status: SHIPPED | OUTPATIENT
Start: 2021-03-12 | End: 2021-09-08 | Stop reason: SDUPTHER

## 2021-03-30 RX ORDER — MULTIVITAMIN,THERAPEUTIC
TABLET ORAL
Qty: 90 TABLET | Refills: 1 | Status: SHIPPED | OUTPATIENT
Start: 2021-03-30 | End: 2021-05-17

## 2021-03-30 RX ORDER — INSULIN GLARGINE 100 [IU]/ML
12 INJECTION, SOLUTION SUBCUTANEOUS NIGHTLY
Qty: 5 PEN | Refills: 5 | Status: SHIPPED | OUTPATIENT
Start: 2021-03-30 | End: 2021-09-23 | Stop reason: SDUPTHER

## 2021-04-07 RX ORDER — AMIODARONE HYDROCHLORIDE 100 MG/1
TABLET ORAL
Qty: 30 TABLET | Refills: 5 | Status: SHIPPED | OUTPATIENT
Start: 2021-04-07 | End: 2021-07-29

## 2021-04-08 ENCOUNTER — TELEPHONE (OUTPATIENT)
Dept: PRIMARY CARE CLINIC | Age: 61
End: 2021-04-08

## 2021-04-08 RX ORDER — ONDANSETRON 4 MG/1
4 TABLET, ORALLY DISINTEGRATING ORAL EVERY 8 HOURS PRN
Qty: 30 TABLET | Refills: 0 | Status: SHIPPED | OUTPATIENT
Start: 2021-04-08 | End: 2021-04-29 | Stop reason: SDUPTHER

## 2021-04-08 NOTE — TELEPHONE ENCOUNTER
Pt family called requesting Zofran and something for leg pain \"lt leg worse\" Took Covid shot yesterday

## 2021-04-29 RX ORDER — ONDANSETRON 4 MG/1
4 TABLET, ORALLY DISINTEGRATING ORAL EVERY 8 HOURS PRN
Qty: 30 TABLET | Refills: 0 | Status: SHIPPED | OUTPATIENT
Start: 2021-04-29

## 2021-04-29 NOTE — TELEPHONE ENCOUNTER
Pt's family called requesting refills on all of pt's medications. They did not know the names of the medications needing refills. I called [facility] to request which medications she needed. The only script that is without refills currently is Zofran.  Sent to [facility] for approval.

## 2021-05-05 ENCOUNTER — OFFICE VISIT (OUTPATIENT)
Dept: CARDIOLOGY | Facility: CLINIC | Age: 61
End: 2021-05-05

## 2021-05-05 ENCOUNTER — HOSPITAL ENCOUNTER (OUTPATIENT)
Facility: HOSPITAL | Age: 61
Discharge: HOME | End: 2021-05-05
Payer: MEDICARE

## 2021-05-05 VITALS
DIASTOLIC BLOOD PRESSURE: 84 MMHG | SYSTOLIC BLOOD PRESSURE: 132 MMHG | HEART RATE: 111 BPM | OXYGEN SATURATION: 98 % | RESPIRATION RATE: 20 BRPM

## 2021-05-05 DIAGNOSIS — E66.01 MORBID (SEVERE) OBESITY DUE TO EXCESS CALORIES (HCC): ICD-10-CM

## 2021-05-05 DIAGNOSIS — J41.1 MUCOPURULENT CHRONIC BRONCHITIS (HCC): ICD-10-CM

## 2021-05-05 DIAGNOSIS — L03.115 CELLULITIS OF BOTH LOWER EXTREMITIES: ICD-10-CM

## 2021-05-05 DIAGNOSIS — J81.0 ACUTE PULMONARY EDEMA (HCC): ICD-10-CM

## 2021-05-05 DIAGNOSIS — N18.4 CHRONIC KIDNEY DISEASE, STAGE 4 (SEVERE) (HCC): ICD-10-CM

## 2021-05-05 DIAGNOSIS — I27.81 COR PULMONALE, CHRONIC (HCC): ICD-10-CM

## 2021-05-05 DIAGNOSIS — I50.32 CHRONIC DIASTOLIC CONGESTIVE HEART FAILURE (HCC): ICD-10-CM

## 2021-05-05 DIAGNOSIS — I89.0 LYMPHEDEMA OF BOTH LOWER EXTREMITIES: ICD-10-CM

## 2021-05-05 DIAGNOSIS — I48.11 LONGSTANDING PERSISTENT ATRIAL FIBRILLATION (HCC): Primary | ICD-10-CM

## 2021-05-05 DIAGNOSIS — L03.116 CELLULITIS OF BOTH LOWER EXTREMITIES: ICD-10-CM

## 2021-05-05 DIAGNOSIS — I87.303 CHRONIC VENOUS HYPERTENSION INVOLVING BOTH SIDES: ICD-10-CM

## 2021-05-05 DIAGNOSIS — D69.6 THROMBOCYTOPENIA, UNSPECIFIED (HCC): ICD-10-CM

## 2021-05-05 DIAGNOSIS — L89.610 PRESSURE ULCER OF RIGHT HEEL, UNSTAGEABLE (HCC): ICD-10-CM

## 2021-05-05 DIAGNOSIS — E11.8 TYPE 2 DIABETES MELLITUS WITH UNSPECIFIED COMPLICATIONS (HCC): ICD-10-CM

## 2021-05-05 DIAGNOSIS — I10 ESSENTIAL HYPERTENSION: ICD-10-CM

## 2021-05-05 PROCEDURE — 99215 OFFICE O/P EST HI 40 MIN: CPT | Performed by: INTERNAL MEDICINE

## 2021-05-05 PROCEDURE — 93005 ELECTROCARDIOGRAM TRACING: CPT

## 2021-05-05 PROCEDURE — 93000 ELECTROCARDIOGRAM COMPLETE: CPT | Performed by: INTERNAL MEDICINE

## 2021-05-05 RX ORDER — ONDANSETRON 4 MG/1
TABLET, ORALLY DISINTEGRATING ORAL
COMMUNITY
Start: 2021-04-29 | End: 2022-04-14 | Stop reason: HOSPADM

## 2021-05-05 RX ORDER — METOPROLOL TARTRATE 100 MG/1
100 TABLET ORAL 2 TIMES DAILY
Qty: 60 TABLET | Refills: 11 | Status: SHIPPED | OUTPATIENT
Start: 2021-05-05 | End: 2022-04-14 | Stop reason: HOSPADM

## 2021-05-05 RX ORDER — DIPHENOXYLATE HYDROCHLORIDE AND ATROPINE SULFATE 2.5; .025 MG/1; MG/1
TABLET ORAL
COMMUNITY
Start: 2021-03-30 | End: 2022-04-14 | Stop reason: HOSPADM

## 2021-05-05 NOTE — PROGRESS NOTES
"    Subjective:     Encounter Date:05/05/2021      Patient ID: Millicent Mckeon is a 60 y.o. female.    Chief Complaint:\" They told me to come here\".  HPI  This is a 60-year-old female patient who presents to cardiology clinic for atrial fibrillation.  The patient has a longstanding history of paroxysmal atrial fibrillation.  She is currently in atrial flutter with a controlled ventricular response.  She indicates that she has no symptoms and is unaware that her heart is \"out of rhythm\".  Specifically, she has no dizziness, palpitations, syncope, shortness of breath or chest discomfort.  The patient indicates that she feels better than she has felt in \"many years\".  There has been numerous alterations of her cardiac medications without input from our office.  The following portions of the patient's history were reviewed and updated as appropriate: allergies, current medications, past family history, past medical history, past social history, past surgical history and problem  Review of Systems   Constitutional: Negative for chills, diaphoresis, fever, malaise/fatigue, weight gain and weight loss.   HENT: Negative for ear discharge, hearing loss, hoarse voice and nosebleeds.    Eyes: Negative for discharge, double vision, pain and photophobia.   Cardiovascular: Negative for chest pain, claudication, cyanosis, dyspnea on exertion, irregular heartbeat, leg swelling, near-syncope, orthopnea, palpitations, paroxysmal nocturnal dyspnea and syncope.   Respiratory: Negative for cough, hemoptysis, shortness of breath, sputum production and wheezing.    Endocrine: Negative for cold intolerance, heat intolerance, polydipsia, polyphagia and polyuria.   Hematologic/Lymphatic: Negative for adenopathy and bleeding problem. Does not bruise/bleed easily.   Skin: Negative for color change, flushing, itching and rash.   Musculoskeletal: Negative for muscle cramps, muscle weakness, myalgias and stiffness.   Gastrointestinal: Negative " for abdominal pain, diarrhea, hematemesis, hematochezia, nausea and vomiting.   Genitourinary: Negative for dysuria, frequency and nocturia.   Neurological: Negative for focal weakness, loss of balance, numbness, paresthesias and seizures.   Psychiatric/Behavioral: Negative for altered mental status, hallucinations and suicidal ideas.   Allergic/Immunologic: Negative for HIV exposure, hives and persistent infections.           Current Outpatient Medications:   •  acetaminophen (TYLENOL) 325 MG tablet, Take 650 mg by mouth Every 4 (Four) Hours As Needed for Mild Pain ., Disp: , Rfl:   •  amiodarone (PACERONE) 100 MG tablet, Take 1 tablet by mouth Every Other Day., Disp: 15 tablet, Rfl: 11  •  ammonium lactate (AMLACTIN) 12 % cream, Apply 1 application topically to the appropriate area as directed 2 (Two) Times a Day., Disp: , Rfl:   •  apixaban (ELIQUIS) 5 MG tablet tablet, Take 1 tablet by mouth Every 12 (Twelve) Hours., Disp: 60 tablet, Rfl: 11  •  arginine 500 MG tablet, Take 500 mg by mouth Daily., Disp: , Rfl:   •  aspirin 81 MG chewable tablet, Chew 81 mg Daily., Disp: , Rfl: 0  •  bumetanide (BUMEX) 1 MG tablet, Take 1 tablet by mouth Daily., Disp: , Rfl:   •  docusate sodium (COLACE) 100 MG capsule, Take 100 mg by mouth 2 (Two) Times a Day., Disp: , Rfl:   •  Elastic Bandages & Supports (JOBST OPAQUE KNEE 20-30MMHG XL) misc, 1 application Daily., Disp: 1 each, Rfl: 1  •  ferrous sulfate 324 (65 Fe) MG tablet delayed-release EC tablet, Take 324 mg by mouth 2 (Two) Times a Day With Meals., Disp: , Rfl:   •  insulin aspart (novoLOG) 100 UNIT/ML injection, Inject  under the skin into the appropriate area as directed 2 (Two) Times a Day. Sliding scale, low dose , Disp: , Rfl:   •  isosorbide mononitrate (ISMO,MONOKET) 10 MG tablet, Take 15 mg by mouth Daily., Disp: , Rfl:   •  levothyroxine (SYNTHROID, LEVOTHROID) 150 MCG tablet, Take 150 mcg by mouth Daily., Disp: , Rfl:   •  lidocaine (LIDODERM) 5 %, Place 1  patch on the skin as directed by provider Daily. Apply patch to left knee daily, on at 0700 am off at 2000, Disp: , Rfl:   •  multivitamin (Thera) tablet tablet, TAKE 1 TABLET BY MOUTH EVERY DAY, Disp: , Rfl:   •  nitroglycerin (NITROSTAT) 0.4 MG SL tablet, Place 0.4 mg under the tongue Every 5 (Five) Minutes As Needed for Chest Pain. Take no more than 3 doses in 15 minutes., Disp: , Rfl:   •  Nutritional Supplements (PROTEIN SUPPLEMENT 80% PO), Take 30 mL by mouth 2 (Two) Times a Day., Disp: , Rfl:   •  ondansetron ODT (ZOFRAN-ODT) 4 MG disintegrating tablet, DISSOLVE 1 TABLET ON THE TONGUE EVERY 8 HOURS AS NEEDED FOR NAUSEA OR VOMITING, Disp: , Rfl:   •  pantoprazole (PROTONIX) 40 MG EC tablet, Take 40 mg by mouth Daily., Disp: , Rfl:   •  polyethylene glycol (MIRALAX) packet, Take 17 g by mouth 2 (Two) Times a Day., Disp: , Rfl:   •  RA VITAMIN C 500 MG tablet, Take 500 mg by mouth Daily., Disp: , Rfl: 0  •  saccharomyces boulardii (FLORASTOR) 250 MG capsule, Take 250 mg by mouth 2 (Two) Times a Day., Disp: , Rfl:   •  simvastatin (ZOCOR) 20 MG tablet, Take 20 mg by mouth Every Night., Disp: , Rfl: 0  •  spironolactone (ALDACTONE) 25 MG tablet, Take 1 tablet by mouth 3 (Three) Times a Week. Monday, Wednesday and Friday., Disp: , Rfl:   •  Zinc Sulfate 220 (50 Zn) MG tablet, Take 1 tablet by mouth 2 (Two) Times a Day., Disp: , Rfl: 0  •  metoprolol tartrate (LOPRESSOR) 100 MG tablet, Take 1 tablet by mouth 2 (Two) Times a Day., Disp: 60 tablet, Rfl: 11    Objective:   Vitals and nursing note reviewed.   Constitutional:       Appearance: Healthy appearance. Not in distress.   Neck:      Vascular: No JVR. JVD normal.   Pulmonary:      Effort: Pulmonary effort is normal.      Breath sounds: Normal breath sounds. No wheezing. No rhonchi. No rales.   Chest:      Chest wall: Not tender to palpatation.   Cardiovascular:      PMI at left midclavicular line. Normal rate. Regular rhythm. Normal S1. Normal S2.      Murmurs:  There is no murmur.      No gallop. No click. No rub.   Pulses:     Intact distal pulses.   Edema:     Peripheral edema present.     Pretibial: bilateral 3+ pitting edema of the pretibial area.     Ankle: bilateral 3+ pitting edema of the ankle.     Feet: bilateral 3+ pitting edema of the feet.  Abdominal:      General: Bowel sounds are normal.      Palpations: Abdomen is soft.      Tenderness: There is no abdominal tenderness.   Musculoskeletal: Normal range of motion.         General: No tenderness. Skin:     General: Skin is warm and dry.   Neurological:      General: No focal deficit present.      Mental Status: Alert and oriented to person, place and time.       Blood pressure 132/84, pulse 111, resp. rate 20, SpO2 98 %.   Lab Review:     Assessment:       1. Longstanding persistent atrial fibrillation (CMS/HCC)  Asymptomatic.  Rate control and anticoagulation strategy.    2. Chronic diastolic congestive heart failure (CMS/HCC)  Heart failure with preserved ejection fraction.  Stage C.  New York Heart Association functional class I symptoms.  Euvolemic.    3. Chronic venous hypertension involving both sides  Chronic lymphedema.    4. Cor pulmonale, chronic (CMS/HCC)  Chronic right heart failure.    5. Essential hypertension  Acceptable blood pressure control.    6. Lymphedema of both lower extremities  Chronic lymphedema with evidence of recurrent cellulitis.  The patient has extensive scarring of the lymphatic system with persistent severe lymphedema.    7. Cellulitis of both lower extremities  Recurrent cellulitis with evidence of active lymphangitis.    8. Pressure ulcer of right heel, unstageable (CMS/HCC)  Healed.    9. Mucopurulent chronic bronchitis (CMS/HCC)  Stable.    10. Acute pulmonary edema (CMS/HCC)  Resolved.    11. Thrombocytopenia, unspecified (CMS/HCC)  Followed by primary care provider.    12. Type 2 diabetes mellitus with unspecified complications (CMS/HCC)  Typical obesity related insulin  resistance.    13. Chronic kidney disease, stage 4 (severe) (CMS/HCC)  Followed by local nephrologist.    14. Morbid (severe) obesity due to excess calories (CMS/HCC)  Body mass index is now approaching 50.  The obesity pattern is central.  There is evidence of multiple comorbidities.      ECG 12 Lead    Date/Time: 5/5/2021 11:36 AM  Performed by: Kevin Aceves MD  Authorized by: Kevin Aceves MD   Previous ECG: no previous ECG available  Rhythm: atrial flutter  Rate: normal  QRS axis: normal    Clinical impression: abnormal EKG            Plan:     I have recommended increasing her Lopressor to 100 mg orally twice per day.  No further cardiovascular testing is warranted.

## 2021-05-10 RX ORDER — APIXABAN 5 MG/1
TABLET, FILM COATED ORAL
Qty: 180 TABLET | Refills: 3 | Status: SHIPPED | OUTPATIENT
Start: 2021-05-10 | End: 2021-09-08 | Stop reason: SDUPTHER

## 2021-05-17 RX ORDER — MULTIVITAMIN,THERAPEUTIC
TABLET ORAL
Qty: 90 TABLET | Refills: 1 | Status: SHIPPED | OUTPATIENT
Start: 2021-05-17 | End: 2021-08-19 | Stop reason: SDUPTHER

## 2021-05-17 RX ORDER — ASCORBIC ACID 500 MG
TABLET ORAL
Qty: 30 TABLET | Refills: 5 | Status: SHIPPED | OUTPATIENT
Start: 2021-05-17 | End: 2021-09-08 | Stop reason: SDUPTHER

## 2021-05-17 RX ORDER — ARGININE 500 MG
500 TABLET ORAL DAILY
Qty: 30 TABLET | Refills: 5 | Status: SHIPPED | OUTPATIENT
Start: 2021-05-17

## 2021-06-29 ENCOUNTER — TELEPHONE (OUTPATIENT)
Dept: PRIMARY CARE CLINIC | Age: 61
End: 2021-06-29

## 2021-06-29 NOTE — TELEPHONE ENCOUNTER
She has to be seen in the office. She is very complicated and I cannot take care of her over the phone.

## 2021-06-29 NOTE — TELEPHONE ENCOUNTER
Pt called to cancel. Her niece was in a car accident and she does not have a ride.   did not request a reschedule, she asked that Arlin call her, and I let her know that Arlin needs to see her in office,

## 2021-07-29 RX ORDER — AMIODARONE HYDROCHLORIDE 100 MG/1
TABLET ORAL
Qty: 30 TABLET | Refills: 1 | Status: SHIPPED | OUTPATIENT
Start: 2021-07-29 | End: 2021-09-08 | Stop reason: SDUPTHER

## 2021-08-03 ENCOUNTER — TELEPHONE (OUTPATIENT)
Dept: PRIMARY CARE CLINIC | Age: 61
End: 2021-08-03

## 2021-08-03 NOTE — TELEPHONE ENCOUNTER
I have not seen her in over a year. I have not changed anything. Let her know that I will not refill any more of her medication if she does not make and keep an appointment in the office.

## 2021-08-03 NOTE — TELEPHONE ENCOUNTER
Pt states she cannot make an appt due to no transportation, will call back to schedule when she has a ride

## 2021-08-19 RX ORDER — BUMETANIDE 2 MG/1
TABLET ORAL
Qty: 30 TABLET | Refills: 2 | Status: SHIPPED | OUTPATIENT
Start: 2021-08-19

## 2021-08-19 RX ORDER — MULTIVITAMIN,THERAPEUTIC
TABLET ORAL
Qty: 90 TABLET | Refills: 1 | Status: SHIPPED | OUTPATIENT
Start: 2021-08-19

## 2021-08-27 RX ORDER — PEN NEEDLE, DIABETIC 32GX 5/32"
NEEDLE, DISPOSABLE MISCELLANEOUS
Qty: 100 EACH | Refills: 5 | Status: SHIPPED | OUTPATIENT
Start: 2021-08-27

## 2021-08-30 RX ORDER — SIMVASTATIN 20 MG
TABLET ORAL
Qty: 30 TABLET | Refills: 5 | OUTPATIENT
Start: 2021-08-30

## 2021-08-30 NOTE — TELEPHONE ENCOUNTER
----- Message from Carlota Turner sent at 8/28/2021  8:39 AM EDT -----  Subject: Refill Request    QUESTIONS  Name of Medication? simvastatin (ZOCOR) 20 MG tablet  Patient-reported dosage and instructions? n/a  How many days do you have left? 0  [id number]  Pharmacy phone number (if available)? [phone]  ---------------------------------------------------------------------------  --------------  CALL BACK INFO  What is the best way for the office to contact you? OK to leave message on   voicemail  Preferred Call Back Phone Number?  [phone]

## 2021-08-31 RX ORDER — METOLAZONE 5 MG/1
5 TABLET ORAL DAILY
Qty: 90 TABLET | Refills: 3 | Status: SHIPPED | OUTPATIENT
Start: 2021-08-31

## 2021-08-31 RX ORDER — LEVOTHYROXINE SODIUM 175 UG/1
175 TABLET ORAL DAILY
Qty: 30 TABLET | Refills: 3 | Status: SHIPPED | OUTPATIENT
Start: 2021-08-31

## 2021-09-08 RX ORDER — AMIODARONE HYDROCHLORIDE 100 MG/1
TABLET ORAL
Qty: 15 TABLET | Refills: 0 | Status: SHIPPED | OUTPATIENT
Start: 2021-09-08

## 2021-09-08 RX ORDER — MECLIZINE HYDROCHLORIDE 25 MG/1
TABLET ORAL
Qty: 30 TABLET | Refills: 0 | Status: SHIPPED | OUTPATIENT
Start: 2021-09-08

## 2021-09-08 RX ORDER — AMMONIUM LACTATE 12 G/100G
LOTION TOPICAL
Qty: 400 G | Refills: 0 | Status: SHIPPED | OUTPATIENT
Start: 2021-09-08

## 2021-09-08 RX ORDER — ASCORBIC ACID 500 MG
TABLET ORAL
Qty: 30 TABLET | Refills: 0 | Status: SHIPPED | OUTPATIENT
Start: 2021-09-08 | End: 2021-10-08

## 2021-09-08 RX ORDER — PANTOPRAZOLE SODIUM 40 MG/1
40 TABLET, DELAYED RELEASE ORAL DAILY
Qty: 30 TABLET | Refills: 0 | Status: SHIPPED | OUTPATIENT
Start: 2021-09-08 | End: 2021-09-13

## 2021-09-08 RX ORDER — FERROUS SULFATE 325(65) MG
TABLET ORAL
Qty: 60 TABLET | Refills: 0 | Status: SHIPPED | OUTPATIENT
Start: 2021-09-08 | End: 2021-10-08

## 2021-09-08 RX ORDER — SIMVASTATIN 20 MG
TABLET ORAL
Qty: 30 TABLET | Refills: 0 | Status: SHIPPED | OUTPATIENT
Start: 2021-09-08 | End: 2021-10-08

## 2021-09-08 RX ORDER — LOPERAMIDE HYDROCHLORIDE 2 MG/1
CAPSULE ORAL
Qty: 30 CAPSULE | Refills: 0 | Status: SHIPPED | OUTPATIENT
Start: 2021-09-08

## 2021-09-13 RX ORDER — ISOSORBIDE MONONITRATE 30 MG/1
30 TABLET, EXTENDED RELEASE ORAL DAILY
Qty: 30 TABLET | Refills: 3 | Status: SHIPPED | OUTPATIENT
Start: 2021-09-13

## 2021-09-13 RX ORDER — PANTOPRAZOLE SODIUM 40 MG/1
40 TABLET, DELAYED RELEASE ORAL DAILY
Qty: 90 TABLET | Refills: 0 | Status: SHIPPED | OUTPATIENT
Start: 2021-09-13

## 2021-09-23 ENCOUNTER — OFFICE VISIT (OUTPATIENT)
Dept: PRIMARY CARE CLINIC | Age: 61
End: 2021-09-23
Payer: MEDICARE

## 2021-09-23 VITALS
SYSTOLIC BLOOD PRESSURE: 159 MMHG | OXYGEN SATURATION: 99 % | DIASTOLIC BLOOD PRESSURE: 98 MMHG | TEMPERATURE: 98.4 F | HEART RATE: 111 BPM

## 2021-09-23 DIAGNOSIS — I48.91 ATRIAL FIBRILLATION, UNSPECIFIED TYPE (HCC): ICD-10-CM

## 2021-09-23 DIAGNOSIS — I50.9 CONGESTIVE HEART FAILURE, UNSPECIFIED HF CHRONICITY, UNSPECIFIED HEART FAILURE TYPE (HCC): ICD-10-CM

## 2021-09-23 DIAGNOSIS — N18.4 CHRONIC KIDNEY DISEASE, STAGE 4 (SEVERE) (HCC): ICD-10-CM

## 2021-09-23 DIAGNOSIS — E11.8 TYPE 2 DIABETES MELLITUS WITH COMPLICATION (HCC): Primary | ICD-10-CM

## 2021-09-23 LAB — HBA1C MFR BLD: 14 %

## 2021-09-23 PROCEDURE — 99214 OFFICE O/P EST MOD 30 MIN: CPT | Performed by: NURSE PRACTITIONER

## 2021-09-23 PROCEDURE — 3017F COLORECTAL CA SCREEN DOC REV: CPT | Performed by: NURSE PRACTITIONER

## 2021-09-23 PROCEDURE — G0008 ADMIN INFLUENZA VIRUS VAC: HCPCS | Performed by: NURSE PRACTITIONER

## 2021-09-23 PROCEDURE — G8427 DOCREV CUR MEDS BY ELIG CLIN: HCPCS | Performed by: NURSE PRACTITIONER

## 2021-09-23 PROCEDURE — G8421 BMI NOT CALCULATED: HCPCS | Performed by: NURSE PRACTITIONER

## 2021-09-23 PROCEDURE — 90674 CCIIV4 VAC NO PRSV 0.5 ML IM: CPT | Performed by: NURSE PRACTITIONER

## 2021-09-23 PROCEDURE — 2022F DILAT RTA XM EVC RTNOPTHY: CPT | Performed by: NURSE PRACTITIONER

## 2021-09-23 PROCEDURE — 83036 HEMOGLOBIN GLYCOSYLATED A1C: CPT | Performed by: NURSE PRACTITIONER

## 2021-09-23 PROCEDURE — 3046F HEMOGLOBIN A1C LEVEL >9.0%: CPT | Performed by: NURSE PRACTITIONER

## 2021-09-23 PROCEDURE — 1036F TOBACCO NON-USER: CPT | Performed by: NURSE PRACTITIONER

## 2021-09-23 RX ORDER — METOPROLOL TARTRATE 50 MG/1
50 TABLET, FILM COATED ORAL 2 TIMES DAILY
Qty: 60 TABLET | Refills: 5 | Status: SHIPPED | OUTPATIENT
Start: 2021-09-23

## 2021-09-23 RX ORDER — INSULIN GLARGINE 100 [IU]/ML
25 INJECTION, SOLUTION SUBCUTANEOUS NIGHTLY
Qty: 5 PEN | Refills: 5 | Status: SHIPPED | OUTPATIENT
Start: 2021-09-23

## 2021-09-23 NOTE — PATIENT INSTRUCTIONS
Increase Metoprolol to 50mg twice a day for heart rate and blood pressure. Increase Novolog to 10u with each meal.  Increase Basaglar to 25u at night.

## 2021-09-27 ENCOUNTER — TELEPHONE (OUTPATIENT)
Dept: PRIMARY CARE CLINIC | Age: 61
End: 2021-09-27

## 2021-09-27 NOTE — TELEPHONE ENCOUNTER
----- Message from Toya Rivera sent at 9/25/2021 10:02 AM EDT -----  Subject: Message to Provider    QUESTIONS  Information for Provider? Pt would like a prescription for a new blood   glucose monitor as hers is no longer working.   ---------------------------------------------------------------------------  --------------  [address]  What is the best way for the office to contact you? OK to leave message on   voicemail  Preferred Call Back Phone Number? [phone]  ---------------------------------------------------------------------------  --------------  SCRIPT ANSWERS  Relationship to Patient?  Self

## 2021-09-28 RX ORDER — BLOOD-GLUCOSE METER
1 KIT MISCELLANEOUS DAILY PRN
Qty: 1 KIT | Refills: 0 | Status: SHIPPED | OUTPATIENT
Start: 2021-09-28

## 2021-10-10 PROBLEM — L97.509 FOOT ULCERATION (HCC): Status: RESOLVED | Noted: 2019-02-05 | Resolved: 2021-10-10

## 2021-10-10 ASSESSMENT — ENCOUNTER SYMPTOMS
VOMITING: 0
EYE PAIN: 0
SHORTNESS OF BREATH: 0
SORE THROAT: 0
COUGH: 0
ABDOMINAL PAIN: 0
NAUSEA: 0

## 2021-10-11 NOTE — PROGRESS NOTES
SUBJECTIVE:    Patient ID: Lia Valentine is a 59 y.o. female. Medical History Review  Past Medical, Family, and Social History reviewed. Health Maintenance Due   Topic Date Due    Hepatitis C screen  Never done    COVID-19 Vaccine (1) Never done    HIV screen  Never done    DTaP/Tdap/Td vaccine (1 - Tdap) Never done    Cervical cancer screen  Never done    Diabetic foot exam  11/19/2015    Diabetic retinal exam  11/19/2015    Shingles Vaccine (2 of 3) 12/01/2017    Pneumococcal 0-64 years Vaccine (2 of 4 - PPSV23) 12/01/2017    Breast cancer screen  03/24/2018    Lipid screen  02/04/2020    Annual Wellness Visit (AWV)  Never done    TSH testing  04/11/2021    Potassium monitoring  04/27/2021    Creatinine monitoring  04/27/2021       HPI:   Chief Complaint   Patient presents with    Medication Refill   She has not been getting out due to transportation issues. She has been feeling pretty good. She is not checking her sugar very often. She has not been seen in our office in over a year. Her leg swelling is not bed. Patient's medications, allergies, past medical, surgical, social and family histories were reviewed and updated as appropriate. Review of Systems   Constitutional: Negative for chills and fever. HENT: Negative for ear pain and sore throat. Eyes: Negative for pain and visual disturbance. Respiratory: Negative for cough and shortness of breath. Cardiovascular: Negative for chest pain, palpitations and leg swelling. Gastrointestinal: Negative for abdominal pain, nausea and vomiting. Genitourinary: Negative for dysuria and hematuria. Musculoskeletal: Negative for joint swelling. Skin: Negative for rash. Neurological: Negative for dizziness and weakness. Psychiatric/Behavioral: Negative for sleep disturbance. Reviewed and acurate. See MA note.     OBJECTIVE:  BP (!) 159/98 (Site: Right Upper Arm, Position: Sitting)   Pulse 111   Temp 98.4 °F (36.9 °C) (Temporal)   SpO2 99%    Physical Exam  Vitals reviewed. Constitutional:       General: She is not in acute distress. Appearance: She is well-developed. HENT:      Head: Normocephalic. Mouth/Throat:      Pharynx: No oropharyngeal exudate. Eyes:      General: Lids are normal.   Cardiovascular:      Rate and Rhythm: Normal rate and regular rhythm. Heart sounds: Normal heart sounds. Pulmonary:      Effort: Pulmonary effort is normal.      Breath sounds: Normal breath sounds. Abdominal:      General: Bowel sounds are normal. There is no distension. Palpations: Abdomen is soft. Tenderness: There is no abdominal tenderness. Musculoskeletal:      Cervical back: Neck supple. Comments: Mild bilateral lower extremity edema   Lymphadenopathy:      Cervical: No cervical adenopathy. Skin:     General: Skin is warm and dry. Neurological:      Mental Status: She is alert and oriented to person, place, and time. No results found for requested labs within last 30 days. Hemoglobin A1C (%)   Date Value   09/23/2021 14.0     Microscopic Examination (no units)   Date Value   04/11/2020 YES     LDL Calculated (mg/dL)   Date Value   02/04/2019 68       Lab Results   Component Value Date    WBC 5.4 06/23/2020    NEUTROABS 3.8 06/23/2020    HGB 9.8 (L) 06/23/2020    HCT 32.9 (L) 06/23/2020    MCV 79.3 (L) 06/23/2020     06/23/2020     Lab Results   Component Value Date    TSH 11.68 (H) 04/11/2020       Prior to Visit Medications    Medication Sig Taking? Authorizing Provider   insulin glargine (BASAGLAR KWIKPEN) 100 UNIT/ML injection pen Inject 25 Units into the skin nightly Yes JOSE Carbajal   NOVOLOG 100 UNIT/ML injection vial Inject 10 Units into the skin 3 times daily (before meals) Yes JOSE Carbajal   metoprolol tartrate (LOPRESSOR) 50 MG tablet Take 1 tablet by mouth 2 times daily Stop bystolic when starting this medication.  Yes JOSE Carbajal pantoprazole (PROTONIX) 40 MG tablet TAKE 1 TABLET BY MOUTH DAILY Yes JOSE Morales   isosorbide mononitrate (IMDUR) 30 MG extended release tablet TAKE 1 TABLET BY MOUTH DAILY Yes JOSE Morales   ammonium lactate (LAC-HYDRIN) 12 % lotion Apply topically as needed.  Yes JOSE Morales   amiodarone (PACERONE) 100 MG tablet TAKE 1 TABLET BY MOUTH EVERY 2 DAYS Yes JOSE Morales   apixaban (ELIQUIS) 5 MG TABS tablet TAKE 1 TABLET BY MOUTH TWICE DAILY Yes JOSE Morales   meclizine (ANTIVERT) 25 MG tablet TAKE 1 TABLET BY MOUTH THREE TIMES DAILY AS NEEDED FOR DIZZINESS OR MOTION SICKNESS Yes JOSE Morales   loperamide (IMODIUM) 2 MG capsule TAKE 1 CAPSULE BY MOUTH FOUR TIMES DAILY AS NEEDED FOR DIARRHEA Yes JOSE Morales   metOLazone (ZAROXOLYN) 5 MG tablet TAKE 1 TABLET BY MOUTH DAILY Yes JOSE Morales   levothyroxine (SYNTHROID) 175 MCG tablet TAKE 1 TABLET BY MOUTH DAILY Yes JOSE Morales   BD PEN NEEDLE MITCHEL 2ND GEN 32G X 4 MM MISC USE ONCE DAILY Yes JOSE Morales   Multiple Vitamin (THERA/BETA-CAROTENE) TABS TAKE 1 TABLET BY MOUTH EVERY DAY Yes JOSE Morales   bumetanide (BUMEX) 2 MG tablet TAKE 1 TABLET BY MOUTH TWICE DAILY Yes JOSE Morales   arginine 500 MG tablet TAKE 1 TABLET BY MOUTH DAILY Yes JOSE Morales   BD INSULIN SYRINGE U/F 31G X 5/16\" 1 ML MISC USE AS DIRECTED EVERY DAY Yes JOSE Morales   ondansetron (ZOFRAN ODT) 4 MG disintegrating tablet Take 1 tablet by mouth every 8 hours as needed for Nausea or Vomiting Yes JOSE Morales   Insulin Syringe-Needle U-100 30G X 5/16\" 1 ML MISC 1 each by Does not apply route daily Yes JOSE Morales   FreeStyle Lancets MISC USE 1 LANCET TO TEST TWICE DAILY Yes JOSE Morales   FREESTYLE LITE strip TEST DAILY AND AS NEEDED FOR SYMPTOMS OF IRREGULAR BLOOD GLUCOSE Yes JOSE Morales   albuterol sulfate  (90 Base) MCG/ACT inhaler INHALE 2 PUFFS INTO THE LUNGS EVERY 4 HOURS AS NEEDED FOR WHEEZING Yes JOSE Denny   blood glucose monitor strips Test daily & as needed for symptoms of irregular blood glucose. Dx E11.9 Freestyle Lite Yes JOSE Denny   aspirin 81 MG chewable tablet Take 81 mg by mouth daily Yes Historical Provider, MD   Skin Protectants, Misc. (DIMETHICONE-ZINC OXIDE) cream Apply 1 Dose topically as needed for Dry Skin (for redness) Apply topically 2 times daily as needed. Yes Historical Provider, MD   docusate sodium (COLACE) 100 MG capsule Take 100 mg by mouth 2 times daily Yes Historical Provider, MD   polyethylene glycol (MIRALAX) powder Take 17 g by mouth 2 times daily Yes Historical Provider, MD   blood glucose test strips (FREESTYLE TEST STRIPS) strip Daily dx:250.00 Yes JOSE Denny   simvastatin (ZOCOR) 20 MG tablet TAKE 1 TABLET BY MOUTH EVERY NIGHT  JOSE Denny   ferrous sulfate (FEROSUL) 325 (65 Fe) MG tablet TAKE 1 TABLET BY MOUTH TWICE A DAY WITH MEALS  JOSE Denny   ascorbic acid (VITAMIN C) 500 MG tablet TAKE 1 TABLET BY MOUTH EVERY DAY  JOSE Denny   glucose monitoring (FREESTYLE) kit 1 kit by Does not apply route daily as needed (elevated glucose) Dx e 11.9  JOSE Denny   lidocaine (LIDODERM) 5 % Place 1 patch onto the skin daily  Patient not taking: Reported on 9/23/2021  JOSE Denny   zinc sulfate (ZINCATE) 220 (50 Zn) MG capsule Take 1 capsule by mouth 2 times daily  Patient not taking: Reported on 9/23/2021  JOSE Denny       ASSESSMENT:  1. Type 2 diabetes mellitus with complication (HCC)    2. Atrial fibrillation, unspecified type ([facility] [address]    3. Congestive heart failure, unspecified HF chronicity, unspecified heart failure type ([facility] [address]    4.  Chronic kidney disease, stage 4 (severe) (HCC)        PLAN:  Orders Placed This Encounter   Medications    insulin glargine (BASAGLAR KWIKPEN) 100 UNIT/ML injection pen     Sig: Inject 25 Units into the skin nightly     Dispense:  5 pen     Refill:  5    NOVOLOG 100 UNIT/ML injection vial     Sig: Inject 10 Units into the skin 3 times daily (before meals)     Dispense:  10 mL     Refill:  5    metoprolol tartrate (LOPRESSOR) 50 MG tablet     Sig: Take 1 tablet by mouth 2 times daily Stop bystolic when starting this medication. Dispense:  60 tablet     Refill:  5     Orders Placed This Encounter   Procedures    INFLUENZA, MDCK QUADV, 2 YRS AND OLDER, IM, PF, PREFILL SYR OR SDV, 0.5ML (FLUCELVAX QUADV, PF)    POCT glycosylated hemoglobin (Hb A1C)     Patient Instructions   Increase Metoprolol to 50mg twice a day for heart rate and blood pressure. Increase Novolog to 10u with each meal.  Increase Basaglar to 25u at night. Come back for labs. F/U 3 months.

## 2022-01-13 NOTE — TELEPHONE ENCOUNTER
----- Message from [facility] sent at 9/8/2021 12:17 PM EDT -----  Subject: Refill Request    QUESTIONS  Name of Medication? ELIQUIS 5 MG TABS tablet  Patient-reported dosage and instructions? TAKE 1 TABLET BY MOUTH TWICE   DAILY  How many days do you have left? 20  Preferred Pharmacy? [id number]  Pharmacy phone number (if available)? [phone]  ---------------------------------------------------------------------------  --------------  CALL BACK INFO  What is the best way for the office to contact you? OK to leave message on   voicemail  Preferred Call Back Phone Number?  [phone] O-Z Plasty Text: The defect edges were debeveled with a #15 scalpel blade.  Given the location of the defect, shape of the defect and the proximity to free margins an O-Z plasty (double transposition flap) was deemed most appropriate.  Using a sterile surgical marker, the appropriate transposition flaps were drawn incorporating the defect and placing the expected incisions within the relaxed skin tension lines where possible.    The area thus outlined was incised deep to adipose tissue with a #15 scalpel blade.  The skin margins were undermined to an appropriate distance in all directions utilizing iris scissors.  Hemostasis was achieved with electrocautery.  The flaps were then transposed into place, one clockwise and the other counterclockwise, and anchored with interrupted buried subcutaneous sutures.

## 2022-01-31 ENCOUNTER — APPOINTMENT (OUTPATIENT)
Dept: CT IMAGING | Facility: HOSPITAL | Age: 62
End: 2022-01-31

## 2022-01-31 ENCOUNTER — HOSPITAL ENCOUNTER (EMERGENCY)
Facility: HOSPITAL | Age: 62
Discharge: HOME | End: 2022-01-31
Attending: EMERGENCY MEDICINE | Admitting: EMERGENCY MEDICINE

## 2022-01-31 ENCOUNTER — APPOINTMENT (OUTPATIENT)
Dept: GENERAL RADIOLOGY | Facility: HOSPITAL | Age: 62
End: 2022-01-31

## 2022-01-31 VITALS
BODY MASS INDEX: 40.18 KG/M2 | WEIGHT: 250 LBS | HEIGHT: 66 IN | OXYGEN SATURATION: 100 % | TEMPERATURE: 98.6 F | DIASTOLIC BLOOD PRESSURE: 90 MMHG | RESPIRATION RATE: 18 BRPM | SYSTOLIC BLOOD PRESSURE: 171 MMHG | HEART RATE: 90 BPM

## 2022-01-31 DIAGNOSIS — N18.9 CHRONIC KIDNEY DISEASE, UNSPECIFIED CKD STAGE: ICD-10-CM

## 2022-01-31 DIAGNOSIS — N61.0 CELLULITIS OF BREAST: ICD-10-CM

## 2022-01-31 DIAGNOSIS — I50.9 CHRONIC CONGESTIVE HEART FAILURE, UNSPECIFIED HEART FAILURE TYPE: Primary | ICD-10-CM

## 2022-01-31 LAB
ALBUMIN SERPL-MCNC: 3.8 G/DL (ref 3.5–5.2)
ALBUMIN/GLOB SERPL: 1.2 G/DL
ALP SERPL-CCNC: 234 U/L (ref 39–117)
ALT SERPL W P-5'-P-CCNC: 16 U/L (ref 1–33)
ANION GAP SERPL CALCULATED.3IONS-SCNC: 16.9 MMOL/L (ref 5–15)
AST SERPL-CCNC: 23 U/L (ref 1–32)
BACTERIA UR QL AUTO: ABNORMAL /HPF
BASOPHILS # BLD AUTO: 0.04 10*3/MM3 (ref 0–0.2)
BASOPHILS NFR BLD AUTO: 0.3 % (ref 0–1.5)
BILIRUB SERPL-MCNC: 0.6 MG/DL (ref 0–1.2)
BILIRUB UR QL STRIP: NEGATIVE
BUN SERPL-MCNC: 42 MG/DL (ref 8–23)
BUN/CREAT SERPL: 12.8 (ref 7–25)
CALCIUM SPEC-SCNC: 8.1 MG/DL (ref 8.6–10.5)
CHLORIDE SERPL-SCNC: 98 MMOL/L (ref 98–107)
CLARITY UR: CLEAR
CO2 SERPL-SCNC: 22.1 MMOL/L (ref 22–29)
COLOR UR: YELLOW
CREAT SERPL-MCNC: 3.28 MG/DL (ref 0.57–1)
D-LACTATE SERPL-SCNC: 2 MMOL/L (ref 0.5–2)
DEPRECATED RDW RBC AUTO: 59.7 FL (ref 37–54)
EOSINOPHIL # BLD AUTO: 0 10*3/MM3 (ref 0–0.4)
EOSINOPHIL NFR BLD AUTO: 0 % (ref 0.3–6.2)
ERYTHROCYTE [DISTWIDTH] IN BLOOD BY AUTOMATED COUNT: 20 % (ref 12.3–15.4)
GFR SERPL CREATININE-BSD FRML MDRD: 14 ML/MIN/1.73
GFR SERPL CREATININE-BSD FRML MDRD: 17 ML/MIN/1.73
GLOBULIN UR ELPH-MCNC: 3.2 GM/DL
GLUCOSE SERPL-MCNC: 172 MG/DL (ref 65–99)
GLUCOSE UR STRIP-MCNC: NEGATIVE MG/DL
HCT VFR BLD AUTO: 37 % (ref 34–46.6)
HGB BLD-MCNC: 10.7 G/DL (ref 12–15.9)
HGB UR QL STRIP.AUTO: ABNORMAL
HOLD SPECIMEN: NORMAL
HOLD SPECIMEN: NORMAL
HYALINE CASTS UR QL AUTO: ABNORMAL /LPF
HYPOCHROMIA BLD QL: NORMAL
IMM GRANULOCYTES # BLD AUTO: 0.14 10*3/MM3 (ref 0–0.05)
IMM GRANULOCYTES NFR BLD AUTO: 1 % (ref 0–0.5)
KETONES UR QL STRIP: NEGATIVE
LEUKOCYTE ESTERASE UR QL STRIP.AUTO: NEGATIVE
LYMPHOCYTES # BLD AUTO: 0.4 10*3/MM3 (ref 0.7–3.1)
LYMPHOCYTES NFR BLD AUTO: 2.9 % (ref 19.6–45.3)
MAGNESIUM SERPL-MCNC: 1.8 MG/DL (ref 1.6–2.4)
MCH RBC QN AUTO: 23.9 PG (ref 26.6–33)
MCHC RBC AUTO-ENTMCNC: 28.9 G/DL (ref 31.5–35.7)
MCV RBC AUTO: 82.6 FL (ref 79–97)
MONOCYTES # BLD AUTO: 0.72 10*3/MM3 (ref 0.1–0.9)
MONOCYTES NFR BLD AUTO: 5.2 % (ref 5–12)
MUCOUS THREADS URNS QL MICRO: ABNORMAL /HPF
NEUTROPHILS NFR BLD AUTO: 12.53 10*3/MM3 (ref 1.7–7)
NEUTROPHILS NFR BLD AUTO: 90.6 % (ref 42.7–76)
NITRITE UR QL STRIP: NEGATIVE
NRBC BLD AUTO-RTO: 0 /100 WBC (ref 0–0.2)
NT-PROBNP SERPL-MCNC: ABNORMAL PG/ML (ref 0–900)
OVALOCYTES BLD QL SMEAR: NORMAL
PH UR STRIP.AUTO: <=5 [PH] (ref 5–8)
PLATELET # BLD AUTO: 157 10*3/MM3 (ref 140–450)
PMV BLD AUTO: 10.4 FL (ref 6–12)
POTASSIUM SERPL-SCNC: 4.8 MMOL/L (ref 3.5–5.2)
PROT SERPL-MCNC: 7 G/DL (ref 6–8.5)
PROT UR QL STRIP: ABNORMAL
RBC # BLD AUTO: 4.48 10*6/MM3 (ref 3.77–5.28)
RBC # UR STRIP: ABNORMAL /HPF
REF LAB TEST METHOD: ABNORMAL
SMALL PLATELETS BLD QL SMEAR: ADEQUATE
SODIUM SERPL-SCNC: 137 MMOL/L (ref 136–145)
SP GR UR STRIP: 1.01 (ref 1–1.03)
SQUAMOUS #/AREA URNS HPF: ABNORMAL /HPF
TROPONIN T SERPL-MCNC: 0.02 NG/ML (ref 0–0.03)
UROBILINOGEN UR QL STRIP: ABNORMAL
WBC # UR STRIP: ABNORMAL /HPF
WBC MORPH BLD: NORMAL
WBC NRBC COR # BLD: 13.83 10*3/MM3 (ref 3.4–10.8)
WHOLE BLOOD HOLD SPECIMEN: NORMAL
WHOLE BLOOD HOLD SPECIMEN: NORMAL

## 2022-01-31 PROCEDURE — 87040 BLOOD CULTURE FOR BACTERIA: CPT | Performed by: NURSE PRACTITIONER

## 2022-01-31 PROCEDURE — 85007 BL SMEAR W/DIFF WBC COUNT: CPT | Performed by: EMERGENCY MEDICINE

## 2022-01-31 PROCEDURE — 83880 ASSAY OF NATRIURETIC PEPTIDE: CPT | Performed by: EMERGENCY MEDICINE

## 2022-01-31 PROCEDURE — 99284 EMERGENCY DEPT VISIT MOD MDM: CPT

## 2022-01-31 PROCEDURE — 96367 TX/PROPH/DG ADDL SEQ IV INF: CPT

## 2022-01-31 PROCEDURE — 96375 TX/PRO/DX INJ NEW DRUG ADDON: CPT

## 2022-01-31 PROCEDURE — 94640 AIRWAY INHALATION TREATMENT: CPT

## 2022-01-31 PROCEDURE — 71250 CT THORAX DX C-: CPT

## 2022-01-31 PROCEDURE — 81001 URINALYSIS AUTO W/SCOPE: CPT | Performed by: NURSE PRACTITIONER

## 2022-01-31 PROCEDURE — 84484 ASSAY OF TROPONIN QUANT: CPT | Performed by: EMERGENCY MEDICINE

## 2022-01-31 PROCEDURE — 96365 THER/PROPH/DIAG IV INF INIT: CPT

## 2022-01-31 PROCEDURE — 80053 COMPREHEN METABOLIC PANEL: CPT | Performed by: EMERGENCY MEDICINE

## 2022-01-31 PROCEDURE — 25010000002 CEFTRIAXONE SODIUM-DEXTROSE 1-3.74 GM-%(50ML) RECONSTITUTED SOLUTION: Performed by: NURSE PRACTITIONER

## 2022-01-31 PROCEDURE — 83605 ASSAY OF LACTIC ACID: CPT | Performed by: EMERGENCY MEDICINE

## 2022-01-31 PROCEDURE — 83735 ASSAY OF MAGNESIUM: CPT | Performed by: NURSE PRACTITIONER

## 2022-01-31 PROCEDURE — 85025 COMPLETE CBC W/AUTO DIFF WBC: CPT | Performed by: EMERGENCY MEDICINE

## 2022-01-31 PROCEDURE — 93005 ELECTROCARDIOGRAM TRACING: CPT | Performed by: EMERGENCY MEDICINE

## 2022-01-31 PROCEDURE — 25010000002 ONDANSETRON PER 1 MG: Performed by: NURSE PRACTITIONER

## 2022-01-31 PROCEDURE — 25010000002 MAGNESIUM SULFATE IN D5W 1G/100ML (PREMIX) 1-5 GM/100ML-% SOLUTION: Performed by: NURSE PRACTITIONER

## 2022-01-31 PROCEDURE — 71045 X-RAY EXAM CHEST 1 VIEW: CPT

## 2022-01-31 PROCEDURE — 94799 UNLISTED PULMONARY SVC/PX: CPT

## 2022-01-31 PROCEDURE — 96366 THER/PROPH/DIAG IV INF ADDON: CPT

## 2022-01-31 RX ORDER — MAGNESIUM SULFATE 1 G/100ML
1 INJECTION INTRAVENOUS ONCE
Status: COMPLETED | OUTPATIENT
Start: 2022-01-31 | End: 2022-01-31

## 2022-01-31 RX ORDER — BUMETANIDE 0.25 MG/ML
2 INJECTION INTRAMUSCULAR; INTRAVENOUS ONCE
Status: COMPLETED | OUTPATIENT
Start: 2022-01-31 | End: 2022-01-31

## 2022-01-31 RX ORDER — ONDANSETRON 2 MG/ML
4 INJECTION INTRAMUSCULAR; INTRAVENOUS ONCE
Status: COMPLETED | OUTPATIENT
Start: 2022-01-31 | End: 2022-01-31

## 2022-01-31 RX ORDER — CEFTRIAXONE 1 G/50ML
1 INJECTION, SOLUTION INTRAVENOUS ONCE
Status: COMPLETED | OUTPATIENT
Start: 2022-01-31 | End: 2022-01-31

## 2022-01-31 RX ORDER — CEPHALEXIN 500 MG/1
500 CAPSULE ORAL 3 TIMES DAILY
Qty: 21 CAPSULE | Refills: 0 | Status: SHIPPED | OUTPATIENT
Start: 2022-01-31 | End: 2022-02-07

## 2022-01-31 RX ORDER — IPRATROPIUM BROMIDE AND ALBUTEROL SULFATE 2.5; .5 MG/3ML; MG/3ML
3 SOLUTION RESPIRATORY (INHALATION) ONCE
Status: COMPLETED | OUTPATIENT
Start: 2022-01-31 | End: 2022-01-31

## 2022-01-31 RX ORDER — SODIUM CHLORIDE 0.9 % (FLUSH) 0.9 %
10 SYRINGE (ML) INJECTION AS NEEDED
Status: DISCONTINUED | OUTPATIENT
Start: 2022-01-31 | End: 2022-01-31 | Stop reason: HOSPADM

## 2022-01-31 RX ADMIN — ONDANSETRON 4 MG: 2 INJECTION INTRAMUSCULAR; INTRAVENOUS at 16:53

## 2022-01-31 RX ADMIN — BUMETANIDE 2 MG: 0.25 INJECTION INTRAMUSCULAR; INTRAVENOUS at 20:10

## 2022-01-31 RX ADMIN — MAGNESIUM SULFATE HEPTAHYDRATE 1 G: 1 INJECTION, SOLUTION INTRAVENOUS at 16:52

## 2022-01-31 RX ADMIN — IPRATROPIUM BROMIDE AND ALBUTEROL SULFATE 3 ML: .5; 2.5 SOLUTION RESPIRATORY (INHALATION) at 16:44

## 2022-01-31 RX ADMIN — CEFTRIAXONE 1 G: 1 INJECTION, SOLUTION INTRAVENOUS at 19:31

## 2022-02-01 NOTE — ED NOTES
Legs wrapped with nonadherent, abd pad and coban. Pt placed in socks. Waiting on POA to arrive for ride home. POA states ETA of 15-20 minutes.      Leana Gordon RN  01/31/22 [id number]

## 2022-02-01 NOTE — ED NOTES
Pt resting in bed comfortably at this time. Call light within reach, FAITH.     Leana Gordon RN  01/31/22 [id number]

## 2022-02-01 NOTE — ED NOTES
Pt assisted into POA's car per two staff members. Pt tolerated well. POA and patient voice understanding of d/c and follow up instructions. Pt has no complaints or concerns to voiced at this time. Pt is tiffany, josé manuel, reberenice. NADN at time of d/c.     Leana Gordon RN  01/31/22 [id number]

## 2022-02-05 LAB
BACTERIA SPEC AEROBE CULT: NORMAL
BACTERIA SPEC AEROBE CULT: NORMAL

## 2022-03-04 ENCOUNTER — HOSPITAL ENCOUNTER (INPATIENT)
Facility: HOSPITAL | Age: 62
LOS: 3 days | Discharge: HOME HEALTH | End: 2022-03-07
Attending: EMERGENCY MEDICINE | Admitting: INTERNAL MEDICINE

## 2022-03-04 ENCOUNTER — APPOINTMENT (OUTPATIENT)
Dept: CT IMAGING | Facility: HOSPITAL | Age: 62
End: 2022-03-04

## 2022-03-04 ENCOUNTER — APPOINTMENT (OUTPATIENT)
Dept: GENERAL RADIOLOGY | Facility: HOSPITAL | Age: 62
End: 2022-03-04

## 2022-03-04 ENCOUNTER — APPOINTMENT (OUTPATIENT)
Dept: CARDIOLOGY | Facility: HOSPITAL | Age: 62
End: 2022-03-04

## 2022-03-04 DIAGNOSIS — R41.82 ALTERED MENTAL STATUS, UNSPECIFIED ALTERED MENTAL STATUS TYPE: ICD-10-CM

## 2022-03-04 DIAGNOSIS — G92.8 TOXIC METABOLIC ENCEPHALOPATHY: ICD-10-CM

## 2022-03-04 DIAGNOSIS — E16.2 HYPOGLYCEMIA: Primary | ICD-10-CM

## 2022-03-04 DIAGNOSIS — L03.119 CELLULITIS OF LOWER EXTREMITY, UNSPECIFIED LATERALITY: ICD-10-CM

## 2022-03-04 DIAGNOSIS — T68.XXXA HYPOTHERMIA, INITIAL ENCOUNTER: ICD-10-CM

## 2022-03-04 DIAGNOSIS — N18.4 CHRONIC KIDNEY DISEASE, STAGE IV (SEVERE): ICD-10-CM

## 2022-03-04 DIAGNOSIS — R60.0 BILATERAL EDEMA OF LOWER EXTREMITY: ICD-10-CM

## 2022-03-04 LAB
A-A DO2: 12.8 MMHG
ALBUMIN SERPL-MCNC: 3.6 G/DL (ref 3.5–5.2)
ALBUMIN/GLOB SERPL: 0.9 G/DL
ALP SERPL-CCNC: 218 U/L (ref 39–117)
ALT SERPL W P-5'-P-CCNC: 13 U/L (ref 1–33)
ANION GAP SERPL CALCULATED.3IONS-SCNC: 15.3 MMOL/L (ref 5–15)
ANISOCYTOSIS BLD QL: NORMAL
ARTERIAL PATENCY WRIST A: POSITIVE
AST SERPL-CCNC: 24 U/L (ref 1–32)
ATMOSPHERIC PRESS: 743 MMHG
BACTERIA UR QL AUTO: ABNORMAL /HPF
BASE EXCESS BLDA CALC-SCNC: 2.4 MMOL/L (ref 0–2)
BASOPHILS # BLD AUTO: 0.03 10*3/MM3 (ref 0–0.2)
BASOPHILS NFR BLD AUTO: 0.5 % (ref 0–1.5)
BDY SITE: ABNORMAL
BILIRUB SERPL-MCNC: 0.6 MG/DL (ref 0–1.2)
BILIRUB UR QL STRIP: NEGATIVE
BUN SERPL-MCNC: 56 MG/DL (ref 8–23)
BUN/CREAT SERPL: 17.6 (ref 7–25)
CALCIUM SPEC-SCNC: 8.2 MG/DL (ref 8.6–10.5)
CHLORIDE SERPL-SCNC: 99 MMOL/L (ref 98–107)
CLARITY UR: CLEAR
CO2 SERPL-SCNC: 23.7 MMOL/L (ref 22–29)
COHGB MFR BLD: 1.5 % (ref 0–2)
COLOR UR: YELLOW
CREAT SERPL-MCNC: 3.19 MG/DL (ref 0.57–1)
D-LACTATE SERPL-SCNC: 1.1 MMOL/L (ref 0.5–2)
DEPRECATED RDW RBC AUTO: 59.6 FL (ref 37–54)
EGFRCR SERPLBLD CKD-EPI 2021: 15.8 ML/MIN/1.73
EOSINOPHIL # BLD AUTO: 0.1 10*3/MM3 (ref 0–0.4)
EOSINOPHIL NFR BLD AUTO: 1.6 % (ref 0.3–6.2)
ERYTHROCYTE [DISTWIDTH] IN BLOOD BY AUTOMATED COUNT: 20.9 % (ref 12.3–15.4)
GLOBULIN UR ELPH-MCNC: 4 GM/DL
GLUCOSE BLDC GLUCOMTR-MCNC: 107 MG/DL (ref 70–130)
GLUCOSE BLDC GLUCOMTR-MCNC: 110 MG/DL (ref 70–130)
GLUCOSE BLDC GLUCOMTR-MCNC: 149 MG/DL (ref 70–130)
GLUCOSE BLDC GLUCOMTR-MCNC: 62 MG/DL (ref 70–130)
GLUCOSE BLDC GLUCOMTR-MCNC: 97 MG/DL (ref 70–130)
GLUCOSE SERPL-MCNC: 52 MG/DL (ref 65–99)
GLUCOSE UR STRIP-MCNC: NEGATIVE MG/DL
HBA1C MFR BLD: 8.5 % (ref 4.8–5.6)
HCO3 BLDA-SCNC: 28.3 MMOL/L (ref 22–28)
HCT VFR BLD AUTO: 37.5 % (ref 34–46.6)
HCT VFR BLD CALC: 33.5 %
HGB BLD-MCNC: 11.2 G/DL (ref 12–15.9)
HGB UR QL STRIP.AUTO: ABNORMAL
HOLD SPECIMEN: NORMAL
HOLD SPECIMEN: NORMAL
HYALINE CASTS UR QL AUTO: ABNORMAL /LPF
HYPOCHROMIA BLD QL: NORMAL
IMM GRANULOCYTES # BLD AUTO: 0.02 10*3/MM3 (ref 0–0.05)
IMM GRANULOCYTES NFR BLD AUTO: 0.3 % (ref 0–0.5)
INHALED O2 CONCENTRATION: 21 %
KETONES UR QL STRIP: NEGATIVE
LEUKOCYTE ESTERASE UR QL STRIP.AUTO: NEGATIVE
LYMPHOCYTES # BLD AUTO: 0.66 10*3/MM3 (ref 0.7–3.1)
LYMPHOCYTES NFR BLD AUTO: 10.5 % (ref 19.6–45.3)
Lab: ABNORMAL
MCH RBC QN AUTO: 23.8 PG (ref 26.6–33)
MCHC RBC AUTO-ENTMCNC: 29.9 G/DL (ref 31.5–35.7)
MCV RBC AUTO: 79.8 FL (ref 79–97)
METHGB BLD QL: 0.4 % (ref 0–1.5)
MODALITY: ABNORMAL
MONOCYTES # BLD AUTO: 0.61 10*3/MM3 (ref 0.1–0.9)
MONOCYTES NFR BLD AUTO: 9.7 % (ref 5–12)
NEUTROPHILS NFR BLD AUTO: 4.87 10*3/MM3 (ref 1.7–7)
NEUTROPHILS NFR BLD AUTO: 77.4 % (ref 42.7–76)
NITRITE UR QL STRIP: NEGATIVE
NOTE: ABNORMAL
NRBC BLD AUTO-RTO: 0 /100 WBC (ref 0–0.2)
NT-PROBNP SERPL-MCNC: ABNORMAL PG/ML (ref 0–900)
OXYHGB MFR BLDV: 93.9 % (ref 94–99)
PCO2 BLDA: 48.7 MM HG (ref 35–45)
PCO2 TEMP ADJ BLD: ABNORMAL MM[HG]
PH BLDA: 7.37 PH UNITS (ref 7.35–7.45)
PH UR STRIP.AUTO: 6.5 [PH] (ref 5–8)
PH, TEMP CORRECTED: ABNORMAL
PLATELET # BLD AUTO: 124 10*3/MM3 (ref 140–450)
PMV BLD AUTO: 10.1 FL (ref 6–12)
PO2 BLDA: 78.3 MM HG (ref 75–100)
PO2 TEMP ADJ BLD: ABNORMAL MM[HG]
POIKILOCYTOSIS BLD QL SMEAR: NORMAL
POTASSIUM SERPL-SCNC: 3.8 MMOL/L (ref 3.5–5.2)
PROCALCITONIN SERPL-MCNC: 0.21 NG/ML (ref 0–0.25)
PROT SERPL-MCNC: 7.6 G/DL (ref 6–8.5)
PROT UR QL STRIP: ABNORMAL
RBC # BLD AUTO: 4.7 10*6/MM3 (ref 3.77–5.28)
RBC # UR STRIP: ABNORMAL /HPF
REF LAB TEST METHOD: ABNORMAL
SAO2 % BLDCOA: 95.7 % (ref 94–100)
SARS-COV-2 RNA PNL SPEC NAA+PROBE: NOT DETECTED
SMALL PLATELETS BLD QL SMEAR: NORMAL
SODIUM SERPL-SCNC: 138 MMOL/L (ref 136–145)
SP GR UR STRIP: 1.01 (ref 1–1.03)
SQUAMOUS #/AREA URNS HPF: ABNORMAL /HPF
UROBILINOGEN UR QL STRIP: ABNORMAL
VENTILATOR MODE: ABNORMAL
WBC # UR STRIP: ABNORMAL /HPF
WBC MORPH BLD: NORMAL
WBC NRBC COR # BLD: 6.29 10*3/MM3 (ref 3.4–10.8)
WHOLE BLOOD HOLD SPECIMEN: NORMAL
WHOLE BLOOD HOLD SPECIMEN: NORMAL

## 2022-03-04 PROCEDURE — 82962 GLUCOSE BLOOD TEST: CPT

## 2022-03-04 PROCEDURE — 83880 ASSAY OF NATRIURETIC PEPTIDE: CPT | Performed by: EMERGENCY MEDICINE

## 2022-03-04 PROCEDURE — 87635 SARS-COV-2 COVID-19 AMP PRB: CPT | Performed by: INTERNAL MEDICINE

## 2022-03-04 PROCEDURE — 99285 EMERGENCY DEPT VISIT HI MDM: CPT

## 2022-03-04 PROCEDURE — 85007 BL SMEAR W/DIFF WBC COUNT: CPT | Performed by: EMERGENCY MEDICINE

## 2022-03-04 PROCEDURE — 85025 COMPLETE CBC W/AUTO DIFF WBC: CPT | Performed by: EMERGENCY MEDICINE

## 2022-03-04 PROCEDURE — 84145 PROCALCITONIN (PCT): CPT | Performed by: EMERGENCY MEDICINE

## 2022-03-04 PROCEDURE — 83605 ASSAY OF LACTIC ACID: CPT | Performed by: EMERGENCY MEDICINE

## 2022-03-04 PROCEDURE — 82805 BLOOD GASES W/O2 SATURATION: CPT

## 2022-03-04 PROCEDURE — 99223 1ST HOSP IP/OBS HIGH 75: CPT | Performed by: INTERNAL MEDICINE

## 2022-03-04 PROCEDURE — 93306 TTE W/DOPPLER COMPLETE: CPT | Performed by: INTERNAL MEDICINE

## 2022-03-04 PROCEDURE — 93005 ELECTROCARDIOGRAM TRACING: CPT | Performed by: EMERGENCY MEDICINE

## 2022-03-04 PROCEDURE — 83050 HGB METHEMOGLOBIN QUAN: CPT

## 2022-03-04 PROCEDURE — 81001 URINALYSIS AUTO W/SCOPE: CPT | Performed by: EMERGENCY MEDICINE

## 2022-03-04 PROCEDURE — 70450 CT HEAD/BRAIN W/O DYE: CPT

## 2022-03-04 PROCEDURE — 93306 TTE W/DOPPLER COMPLETE: CPT

## 2022-03-04 PROCEDURE — 71045 X-RAY EXAM CHEST 1 VIEW: CPT

## 2022-03-04 PROCEDURE — 25010000002 CEFTRIAXONE SODIUM-DEXTROSE 1-3.74 GM-%(50ML) RECONSTITUTED SOLUTION: Performed by: INTERNAL MEDICINE

## 2022-03-04 PROCEDURE — 82375 ASSAY CARBOXYHB QUANT: CPT

## 2022-03-04 PROCEDURE — 36600 WITHDRAWAL OF ARTERIAL BLOOD: CPT

## 2022-03-04 PROCEDURE — 51702 INSERT TEMP BLADDER CATH: CPT

## 2022-03-04 PROCEDURE — 83036 HEMOGLOBIN GLYCOSYLATED A1C: CPT | Performed by: INTERNAL MEDICINE

## 2022-03-04 PROCEDURE — 80053 COMPREHEN METABOLIC PANEL: CPT | Performed by: EMERGENCY MEDICINE

## 2022-03-04 PROCEDURE — 87040 BLOOD CULTURE FOR BACTERIA: CPT | Performed by: HOSPITALIST

## 2022-03-04 RX ORDER — METOPROLOL TARTRATE 50 MG/1
100 TABLET, FILM COATED ORAL 2 TIMES DAILY
Status: DISCONTINUED | OUTPATIENT
Start: 2022-03-04 | End: 2022-03-05

## 2022-03-04 RX ORDER — SODIUM CHLORIDE 0.9 % (FLUSH) 0.9 %
10 SYRINGE (ML) INJECTION AS NEEDED
Status: DISCONTINUED | OUTPATIENT
Start: 2022-03-04 | End: 2022-03-07 | Stop reason: HOSPADM

## 2022-03-04 RX ORDER — ACETAMINOPHEN 650 MG/1
650 SUPPOSITORY RECTAL EVERY 4 HOURS PRN
Status: DISCONTINUED | OUTPATIENT
Start: 2022-03-04 | End: 2022-03-07 | Stop reason: HOSPADM

## 2022-03-04 RX ORDER — ONDANSETRON 2 MG/ML
4 INJECTION INTRAMUSCULAR; INTRAVENOUS EVERY 6 HOURS PRN
Status: DISCONTINUED | OUTPATIENT
Start: 2022-03-04 | End: 2022-03-07 | Stop reason: HOSPADM

## 2022-03-04 RX ORDER — AMIODARONE HYDROCHLORIDE 200 MG/1
100 TABLET ORAL
Status: DISCONTINUED | OUTPATIENT
Start: 2022-03-05 | End: 2022-03-07 | Stop reason: HOSPADM

## 2022-03-04 RX ORDER — CEFTRIAXONE 1 G/50ML
1 INJECTION, SOLUTION INTRAVENOUS EVERY 24 HOURS
Status: DISCONTINUED | OUTPATIENT
Start: 2022-03-04 | End: 2022-03-07 | Stop reason: HOSPADM

## 2022-03-04 RX ORDER — ACETAMINOPHEN 325 MG/1
650 TABLET ORAL EVERY 4 HOURS PRN
Status: DISCONTINUED | OUTPATIENT
Start: 2022-03-04 | End: 2022-03-07 | Stop reason: HOSPADM

## 2022-03-04 RX ORDER — DEXTROSE MONOHYDRATE 100 MG/ML
100 INJECTION, SOLUTION INTRAVENOUS CONTINUOUS
Status: DISCONTINUED | OUTPATIENT
Start: 2022-03-04 | End: 2022-03-05

## 2022-03-04 RX ORDER — PANTOPRAZOLE SODIUM 40 MG/1
40 TABLET, DELAYED RELEASE ORAL DAILY
Status: DISCONTINUED | OUTPATIENT
Start: 2022-03-05 | End: 2022-03-07 | Stop reason: HOSPADM

## 2022-03-04 RX ORDER — NICOTINE POLACRILEX 4 MG
1 LOZENGE BUCCAL
Status: DISCONTINUED | OUTPATIENT
Start: 2022-03-04 | End: 2022-03-07 | Stop reason: HOSPADM

## 2022-03-04 RX ORDER — NALOXONE HCL 0.4 MG/ML
0.4 VIAL (ML) INJECTION ONCE
Status: DISCONTINUED | OUTPATIENT
Start: 2022-03-04 | End: 2022-03-04

## 2022-03-04 RX ORDER — SODIUM CHLORIDE 0.9 % (FLUSH) 0.9 %
3 SYRINGE (ML) INJECTION EVERY 12 HOURS SCHEDULED
Status: DISCONTINUED | OUTPATIENT
Start: 2022-03-04 | End: 2022-03-07 | Stop reason: HOSPADM

## 2022-03-04 RX ORDER — DOCUSATE SODIUM 100 MG/1
100 CAPSULE, LIQUID FILLED ORAL 2 TIMES DAILY
Status: DISCONTINUED | OUTPATIENT
Start: 2022-03-05 | End: 2022-03-07 | Stop reason: HOSPADM

## 2022-03-04 RX ORDER — ASPIRIN 81 MG/1
81 TABLET, CHEWABLE ORAL DAILY
Status: DISCONTINUED | OUTPATIENT
Start: 2022-03-05 | End: 2022-03-07 | Stop reason: HOSPADM

## 2022-03-04 RX ORDER — ACETAMINOPHEN 160 MG/5ML
650 SOLUTION ORAL EVERY 4 HOURS PRN
Status: DISCONTINUED | OUTPATIENT
Start: 2022-03-04 | End: 2022-03-07 | Stop reason: HOSPADM

## 2022-03-04 RX ORDER — NALOXONE HYDROCHLORIDE 1 MG/ML
INJECTION INTRAMUSCULAR; INTRAVENOUS; SUBCUTANEOUS
Status: COMPLETED
Start: 2022-03-04 | End: 2022-03-04

## 2022-03-04 RX ORDER — DEXTROSE MONOHYDRATE 25 G/50ML
50 INJECTION, SOLUTION INTRAVENOUS
Status: DISCONTINUED | OUTPATIENT
Start: 2022-03-04 | End: 2022-03-07 | Stop reason: HOSPADM

## 2022-03-04 RX ORDER — L.ACID,PARA/B.BIFIDUM/S.THERM 8B CELL
1 CAPSULE ORAL 2 TIMES DAILY
Status: DISCONTINUED | OUTPATIENT
Start: 2022-03-05 | End: 2022-03-07 | Stop reason: HOSPADM

## 2022-03-04 RX ORDER — DEXTROSE MONOHYDRATE 25 G/50ML
25 INJECTION, SOLUTION INTRAVENOUS
Status: DISCONTINUED | OUTPATIENT
Start: 2022-03-04 | End: 2022-03-07 | Stop reason: HOSPADM

## 2022-03-04 RX ORDER — SODIUM CHLORIDE 0.9 % (FLUSH) 0.9 %
3-10 SYRINGE (ML) INJECTION AS NEEDED
Status: DISCONTINUED | OUTPATIENT
Start: 2022-03-04 | End: 2022-03-07 | Stop reason: HOSPADM

## 2022-03-04 RX ORDER — LEVOTHYROXINE SODIUM 0.15 MG/1
150 TABLET ORAL DAILY
Status: DISCONTINUED | OUTPATIENT
Start: 2022-03-05 | End: 2022-03-07 | Stop reason: HOSPADM

## 2022-03-04 RX ADMIN — METOPROLOL TARTRATE 100 MG: 50 TABLET ORAL at 21:13

## 2022-03-04 RX ADMIN — CEFTRIAXONE 1 G: 1 INJECTION, SOLUTION INTRAVENOUS at 19:44

## 2022-03-04 RX ADMIN — Medication 3 ML: at 21:14

## 2022-03-04 RX ADMIN — DEXTROSE MONOHYDRATE 50 ML: 25 INJECTION, SOLUTION INTRAVENOUS at 11:00

## 2022-03-04 RX ADMIN — DEXTROSE MONOHYDRATE 100 ML/HR: 100 INJECTION, SOLUTION INTRAVENOUS at 12:36

## 2022-03-04 RX ADMIN — NALOXONE HYDROCHLORIDE 2 MG: 1 INJECTION PARENTERAL at 11:23

## 2022-03-04 RX ADMIN — SILVER SULFADIAZINE 1 APPLICATION: 10 CREAM TOPICAL at 21:13

## 2022-03-04 NOTE — ED PROVIDER NOTES
Subjective   61-year-old chronically ill female presents to the ED via EMS for chief complaint of altered mental status.  Apparently the patient was found at the front door of her home sitting in her wheelchair.  She was minimally responsive to her son who called EMS.  Unknown last time that she was seen normal.  Her initial blood glucose was in the 50s.  She was given dextrose with improvement in her blood glucose level and her mental status improved slightly.  On arrival to the ED the patient is lethargic/sleepy, she does respond to painful stimuli but is unable to provide any accurate history.  Further history and review of systems limited secondary to patient's clinical status.          Review of Systems   Psychiatric/Behavioral: Positive for confusion.   All other systems reviewed and are negative.      Past Medical History:   Diagnosis Date   • A-fib (HCC)    • Anemia 10/2/2018   • Diabetes mellitus (HCC)    • Disease of thyroid gland    • GERD (gastroesophageal reflux disease)    • Gout    • History of transfusion    • Hypertension        Allergies   Allergen Reactions   • Metformin And Related Anaphylaxis     HA, diarrhea, throat swelling       Past Surgical History:   Procedure Laterality Date   • EYE SURGERY     • INCISION AND DRAINAGE LEG Right 1/14/2019    Procedure: Right heel incision and drainage with graft application;  Surgeon: Ar Rueda DPM;  Location: [facility];  Service: Podiatry   • LEG SURGERY         Family History   Problem Relation Age of Onset   • Asthma Mother    • Hypertension Father    • Stroke Father        Social History     Socioeconomic History   • Marital status: Single   Tobacco Use   • Smoking status: Never Smoker   • Smokeless tobacco: Never Used   Vaping Use   • Vaping Use: Never used   Substance and Sexual Activity   • Alcohol use: No   • Drug use: No   • Sexual activity: Defer           Objective   Physical Exam  Vitals and nursing note reviewed.   Constitutional:        General: She is not in acute distress.     Appearance: She is obese. She is not diaphoretic.      Comments: Chronically ill-appearing, morbidly obese, minimally responsive.  Does respond to voice and painful stimuli   HENT:      Head: Normocephalic and atraumatic.      Nose: Nose normal.   Eyes:      Conjunctiva/sclera: Conjunctivae normal.   Cardiovascular:      Rate and Rhythm: Normal rate. Rhythm irregular.   Pulmonary:      Effort: Pulmonary effort is normal. No respiratory distress.      Breath sounds: Normal breath sounds.   Abdominal:      General: There is no distension.      Palpations: Abdomen is soft.      Tenderness: There is no abdominal tenderness. There is no guarding.   Musculoskeletal:         General: No deformity.   Skin:     Comments: Significant chronic skin changes and pitting edema to the bilateral lower extremities and abdominal wall  Some erythema and skin breakdown to the bilateral lower extremities   Neurological:      Mental Status: She is lethargic, disoriented and confused.         Procedures           ED Course  ED Course as of 03/05/22 2128   Fri Mar 04, 2022   1205 Comprehensive Metabolic Panel(!) [CG]   1205 BUN(!): 56 [CG]   1205 Creatinine(!): 3.19 [CG]   1208 EKG interpreted by me.  Sinus rhythm.  Sinus rhythm.  Rate of 77.  Nonspecific Q wave.  No obvious ST or T wave abnormalities.  Abnormal EKG [CG]      ED Course User Index  [CG] Abner Wong DO                                                 [facility]  Critical Care  Performed by: Abner Wnog DO  Authorized by: Abner Wong DO     Critical care provider statement:     Critical care time (minutes): 45    Critical care time was exclusive of:  Separately billable procedures and treating other patients    Critical care was necessary to treat or prevent imminent or life-threatening deterioration of the following conditions: Hypothermia, cellulitis, refractory hypoglycemia, toxic metabolic encephalopathy    Critical care  was time spent personally by me on the following activities:  Ordering and performing treatments and interventions, development of treatment plan with patient or surrogate, discussions with consultants, evaluation of patient's response to treatment, examination of patient, ordering and review of laboratory studies, ordering and review of radiographic studies, pulse oximetry, re-evaluation of patient's condition and review of old charts      Final diagnoses:   Hypoglycemia   Hypothermia, initial encounter   Toxic metabolic encephalopathy   Altered mental status, unspecified altered mental status type       ED Disposition  ED Disposition     ED Disposition   Decision to Admit    Condition   --    Comment   Level of Care: Critical Care [6]   Diagnosis: Hypoglycemia [721357]   Admitting Physician: PRETTY CAMPBELL [[id number]]   Attending Physician: PRETTY CAMPBELL [[id number]]   Isolate for COVID?: No [0]   Certification: I Certify That Inpatient Hospital Services Are Medically Necessary For Greater Than 2 Midnights               No follow-up provider specified.       Medication List      No changes were made to your prescriptions during this visit.          Abner Wong, DO  03/05/22 2129

## 2022-03-04 NOTE — NURSING NOTE
Seen for wound consult. Bilateral lower legs red with multiple scabbed and open areas. Scratches from cat. Callused areas to bilateral feet. Feet soiled consistent with someone who is barefoot majority of time. Cat hair in wounds. Open areas with serous fluid draining. Recommend Cleanse bilateral lower legs with wound cleanser, pat dry, apply thin layer silvadene, cover with gauze, secure with rolled gaze daily.   Abdomen and pendulous breasts with peau de orange skin. Firm to touch. Scattered scabs in various stages of healing. No s/s of infection. Reports scratches and picks at areas.   Left knee fold with red macular rash consistent with yeast. Recommend cleanse twice daily with soap and water, pat dry, apply/rub gently with miconazole powder.  Recommendations for care include a turning schedule, barrier cream twice daily and prn after cleansing, using dry sheets in folds if moisture noted, float heels off bed with pillows or heel lift boots as tolerated and encouraged, increase mobility as appropriate and tolerated to reduce pressure, for dry skin apply lotion/cream and a nutrition consult for dietary needs. Staff to contact provider and re-consult wound nurse for new skin issues or lack of improvement with current recommendations. Thank you for the consult. If you have questions or concerns do not hesitate to contact me.

## 2022-03-04 NOTE — ED NOTES
Report given to Sheila at this time. Pt to be transported to room 330-1.      Douglas Kirkland RN  03/04/22 7335

## 2022-03-04 NOTE — H&P
[facility]   HISTORY AND PHYSICAL      Name:  Millicent Mckeon   Age:  61 y.o.  Sex:  female  :  1960  MRN:  [id number]   Visit Number:  [id number]  Admission Date:  3/4/2022  Date Of Service:  22  Primary Care Physician:  Marianela Albright APRN    Chief Complaint:     Altered mental status.    History Of Presenting Illness:      Ms. Mckeon is a chronically ill lady with history of atrial fibrillation, chronic systolic heart failure, chronic kidney disease stage IV, chronic cor pulmonale, morbid obesity, functional quadriplegia who is wheelchair-bound was brought to the emergency room by EMS after she was found on the front door of her house sitting in a wheelchair in the cold and confused.  When the EMS checked her fingerstick glucose and apparently it was in the 50s.  She was given dextrose with some improvement in her mental status.  Apparently, her brother who talks to her on a daily basis did not have any response on calling her and went to visit her and she was found slumped on the wheelchair next to the front door of the house unconscious.  At that point he called EMS.  According to the niece and power of , patient is usually taking care of herself including showering using a wheelchair.  Normally she is clean in the house and was surprised to learn that the patient was covered with feces and urine when EMS found her.  She was also noted to have significant ulcerations and redness in both her lower extremities.    In the emergency room, she was noted to have significant hypothermia with initial temperature of 88.9 and was placed on warming blanket.  Pulse was 80 with a blood pressure of 134/88.  Pulse oxygen saturation was 98% on room air.  Patient was given Narcan without any significant change in her mental status.  Her initial fingerstick was 62 and it dropped to 52 on her blood work and she was started on dextrose 10% infusion.  Blood work was remarkable for  a creatinine of 3.19 (her baseline), BUN 56, BNP 18,778, procalcitonin 0.21.  Lactic acid was 1.1.  ABG done in the emergency room showed a pH of 7.37, PCO2 49, PO2 78 and bicarb of 28.  CT of the head was unremarkable.  CT of the chest was negative for any infiltrates.  Urinalysis was within normal limits.  Patient slowly improved with regards to her mental status and was able to open her eyes on call.  She did not receive any antibiotics in the emergency room.  She is currently being admitted to the ICU for further evaluation and management.  She does open eyes on call and is able to answer a few questions.  She states that she is thirsty.    Review Of Systems:    All systems were reviewed and negative except as mentioned in history of presenting illness, assessment and plan.    Past Medical History: Patient  has a past medical history of A-fib (HCC), Anemia (10/2/2018), Diabetes mellitus (HCC), Disease of thyroid gland, GERD (gastroesophageal reflux disease), Gout, History of transfusion, and Hypertension.    Past Surgical History: Patient  has a past surgical history that includes Eye surgery; Leg Surgery; and incision and drainage leg (Right, 1/14/2019).    Social History: Patient  reports that she has never smoked. She has never used smokeless tobacco. She reports that she does not drink alcohol and does not use drugs.    Family History: Patient's family history includes Asthma in her mother; Hypertension in her father; Stroke in her father.    Allergies:      Metformin and related    Home Medications:    Prior to Admission Medications     Prescriptions Last Dose Informant Patient Reported? Taking?    acetaminophen (TYLENOL) 325 MG tablet   Yes No    Take 650 mg by mouth Every 4 (Four) Hours As Needed for Mild Pain .    amiodarone (PACERONE) 100 MG tablet   No No    Take 1 tablet by mouth Every Other Day.    ammonium lactate (AMLACTIN) 12 % cream   Yes No    Apply 1 application topically to the appropriate  area as directed 2 (Two) Times a Day.    apixaban (ELIQUIS) 5 MG tablet tablet   No No    Take 1 tablet by mouth Every 12 (Twelve) Hours.    arginine 500 MG tablet   Yes No    Take 500 mg by mouth Daily.    aspirin 81 MG chewable tablet   Yes No    Chew 81 mg Daily.    bumetanide (BUMEX) 1 MG tablet   No No    Take 1 tablet by mouth Daily.    docusate sodium (COLACE) 100 MG capsule   Yes No    Take 100 mg by mouth 2 (Two) Times a Day.    Elastic Bandages & Supports (JOBST OPAQUE KNEE 20-30MMHG XL) misc   No No    1 application Daily.    ferrous sulfate 324 (65 Fe) MG tablet delayed-release EC tablet   Yes No    Take 324 mg by mouth 2 (Two) Times a Day With Meals.    insulin aspart (novoLOG) 100 UNIT/ML injection   Yes No    Inject  under the skin into the appropriate area as directed 2 (Two) Times a Day. Sliding scale, low dose     isosorbide mononitrate (ISMO,MONOKET) 10 MG tablet   Yes No    Take 15 mg by mouth Daily.    levothyroxine (SYNTHROID, LEVOTHROID) 150 MCG tablet   Yes No    Take 150 mcg by mouth Daily.    lidocaine (LIDODERM) 5 %   Yes No    Place 1 patch on the skin as directed by provider Daily. Apply patch to left knee daily, on at 0700 am off at 2000    metoprolol tartrate (LOPRESSOR) 100 MG tablet   No No    Take 1 tablet by mouth 2 (Two) Times a Day.    multivitamin (Thera) tablet tablet   Yes No    TAKE 1 TABLET BY MOUTH EVERY DAY    nitroglycerin (NITROSTAT) 0.4 MG SL tablet   Yes No    Place 0.4 mg under the tongue Every 5 (Five) Minutes As Needed for Chest Pain. Take no more than 3 doses in 15 minutes.    Nutritional Supplements (PROTEIN SUPPLEMENT 80% PO)   Yes No    Take 30 mL by mouth 2 (Two) Times a Day.    ondansetron ODT (ZOFRAN-ODT) 4 MG disintegrating tablet   Yes No    DISSOLVE 1 TABLET ON THE TONGUE EVERY 8 HOURS AS NEEDED FOR NAUSEA OR VOMITING    pantoprazole (PROTONIX) 40 MG EC tablet   Yes No    Take 40 mg by mouth Daily.    polyethylene glycol (MIRALAX) packet   Yes No    Take  "17 g by mouth 2 (Two) Times a Day.    RA VITAMIN C 500 MG tablet   Yes No    Take 500 mg by mouth Daily.    saccharomyces boulardii (FLORASTOR) 250 MG capsule   Yes No    Take 250 mg by mouth 2 (Two) Times a Day.    simvastatin (ZOCOR) 20 MG tablet   Yes No    Take 20 mg by mouth Every Night.    spironolactone (ALDACTONE) 25 MG tablet   No No    Take 1 tablet by mouth 3 (Three) Times a Week. Monday, Wednesday and Friday.    Zinc Sulfate 220 (50 Zn) MG tablet   Yes No    Take 1 tablet by mouth 2 (Two) Times a Day.        ED Medications:    Medications   sodium chloride 0.9 % flush 10 mL (has no administration in time range)   naloxone (NARCAN) injection 0.4 mg (0.4 mg Intravenous Not Given 3/4/22 1213)   dextrose (D50W) (25 g/50 mL) IV injection 50 mL (50 mL Intravenous Given 3/4/22 1100)   dextrose 10 % infusion (100 mL/hr Intravenous New Bag 3/4/22 1236)   Naloxone HCl (NARCAN) 2 MG/2ML injection  - ADS Override Pull (2 mg Intravenous Given 3/4/22 1123)     Vital Signs:  Temp:  [88.9 °F (31.6 °C)-89.1 °F (31.7 °C)] 88.9 °F (31.6 °C)  Heart Rate:  [64-80] 69  Resp:  [20-22] 22  BP: (134-155)/() 142/86        03/04/22  1057   Weight: (!) 159 kg (350 lb)     Body mass index is 56.49 kg/m².    Physical Exam:     Most recent vital Signs: /86   Pulse 69   Temp (!) 88.9 °F (31.6 °C)   Resp 22   Ht 167.6 cm (66\")   Wt (!) 159 kg (350 lb)   SpO2 93%   BMI 56.49 kg/m²     Physical Exam  Constitutional:       General: She is not in acute distress.     Appearance: She is obese. She is ill-appearing.      Comments: Alert on sternal rub and call but drowsy.  Was able to answer a few questions.  Morbidly obese.   HENT:      Head: Normocephalic and atraumatic.      Right Ear: External ear normal.      Left Ear: External ear normal.      Nose: Nose normal.      Mouth/Throat:      Mouth: Mucous membranes are moist.   Eyes:      Extraocular Movements: Extraocular movements intact.      Conjunctiva/sclera: " Conjunctivae normal.   Cardiovascular:      Rate and Rhythm: Normal rate and regular rhythm.      Pulses: Normal pulses.      Heart sounds: Normal heart sounds. No murmur heard.      Pulmonary:      Breath sounds: Normal breath sounds. No wheezing or rales.   Abdominal:      Palpations: Abdomen is soft.      Tenderness: There is no abdominal tenderness. There is no guarding.      Comments: Morbidly obese abdomen with a large pannus.   Musculoskeletal:      Cervical back: Neck supple. No rigidity.      Comments: Bilateral 4+ pitting edema up to the mid thighs.   Skin:     Coloration: Skin is not jaundiced.      Comments: Multiple excoriations and small ulcers with slough noted in the lower extremities with erythema up to the knees.   Neurological:      Comments: Drowsy but wakes up on call.  Was able to answer a few questions.  Moves all 4 limbs but does have significant generalized weakness.   Psychiatric:         Behavior: Behavior normal.       Laboratory data:    I have reviewed the labs done in the emergency room.    Results from last 7 days   Lab Units 03/04/22  1108   SODIUM mmol/L 138   POTASSIUM mmol/L 3.8   CHLORIDE mmol/L 99   CO2 mmol/L 23.7   BUN mg/dL 56*   CREATININE mg/dL 3.19*   CALCIUM mg/dL 8.2*   BILIRUBIN mg/dL 0.6   ALK PHOS U/L 218*   ALT (SGPT) U/L 13   AST (SGOT) U/L 24   GLUCOSE mg/dL 52*       Results from last 7 days   Lab Units 03/04/22  1108   PROBNP pg/mL 18,778.0*       Results from last 7 days   Lab Units 03/04/22  1140   PH, ARTERIAL pH units 7.373   PO2 ART mm Hg 78.3   PCO2, ARTERIAL mm Hg 48.7*   HCO3 ART mmol/L 28.3*     Results from last 7 days   Lab Units 03/04/22  1124   COLOR UA  Yellow   GLUCOSE UA  Negative   KETONES UA  Negative   LEUKOCYTES UA  Negative   PH, URINE  6.5   BILIRUBIN UA  Negative   UROBILINOGEN UA  1.0 E.U./dL   RBC UA /HPF 3-5*   WBC UA /HPF 0-2*     EKG:      EKG done in the emergency room was reviewed by me.  It shows sinus rhythm at 77 bpm.  Normal  axis.  Inverted T waves noted in 1, 2, aVL and V4 to V6.  Poor R wave progression noted in the chest leads.    Radiology:    CT Head Without Contrast    Result Date: 3/4/2022  PROCEDURE: CT HEAD WO CONTRAST-  HISTORY: AMS, altered mental status  COMPARISON: 1/13/2019  TECHNIQUE: Noncontrast exam  FINDINGS: Brain parenchyma is homogeneous without evidence of hemorrhage, mass effect or edema. No extra-axial abnormality is noted. Ventricles and cisterns appear normal.  The visualized sinuses, orbits and petrous temporal bones appear unremarkable.      Unremarkable unenhanced CT of the brain.   This study was performed with techniques to keep radiation doses as low as reasonably achievable (ALARA). Individualized dose reduction techniques using automated exposure control or adjustment of vA and/or kV according to the patient size were employed.  This report was signed and finalized on 3/4/2022 12:38 PM by Fito Andino MD.    XR Chest 1 View    Result Date: 3/4/2022  PROCEDURE: XR CHEST 1 VW-  HISTORY: AMS , hypertension  COMPARISON:  1/31/2022  FINDINGS:  Portable view of the chest demonstrates the lungs to be grossly clear. There is no evidence of effusion, pneumothorax or other significant pleural disease. The mediastinum is unremarkable.  The heart size is moderately enlarged.      No acute findings  This report was signed and finalized on 3/4/2022 11:38 AM by Fito Andino MD.    Assessment:    1. Acute metabolic encephalopathy secondary to #2 and 3, POA.  2. Severe hypothermia, likely environmental, POA.  3. Acute hypoglycemia, POA.  4. Bilateral lower extremity cellulitis.  5. Chronic kidney disease stage IV.  6. Chronic systolic heart failure with ejection fraction of 35%.  7. Paroxysmal atrial fibrillation on apixaban.  8. Chronic cor pulmonale.  9. Chronic hypoxic respiratory failure on home oxygen.  10. Diabetes mellitus type 2 with nephropathy.  11. Chronic venous insufficiency of the lower  extremities.  12. Morbid obesity with a BMI of 56.  13. Anemia of chronic kidney disease.  14. Functional quadriplegia.  15. Acquired hypothyroidism.    Plan:    Metabolic encephalopathy/hypoglycemia/hypothermia.  -Patient will be continued on warming blanket.  -She will be continued on dextrose 10% infusion today.  -She will be placed on hypoglycemia protocol.  -We will keep her n.p.o. until she is fully awake.    Bilateral lower extremity cellulitis.  -She does have significant excoriations and cellulitis of the lower extremity.  -We will start her on IV antibiotics therapy with Rocephin and doxycycline.  -We will obtain wound cultures.  -She will be placed on lactobacillus supplements.  -We will obtain wound care consult.    Congestive heart failure/volume overload/CKD 4.  -I will hold off on Lasix therapy today until her hypoglycemia and mental status has improved.  -We will order 2D echocardiogram.  -We will consult Dr. Cutler from nephrology tomorrow.    I have discussed the patient's condition and treatment plan with her niece and power of  Val over the phone.  Apparently, patient has refused to go to skilled nursing facility in the past.  I discussed advanced directives with her niece and the patient will be kept full code at this time.      Risk Assessment: High due to severe hypothermia and risk of worsening renal and heart failure.  DVT Prophylaxis: Apixaban  Code Status: Full  Diet: N.p.o.    Advance Care Planning      ACP discussion was held with the patient during this visit. Patient does not have an advance directive, information provided.      Samson Reyes MD  03/04/22  12:55 EST    Dictated utilizing Dragon dictation.

## 2022-03-05 LAB
ANION GAP SERPL CALCULATED.3IONS-SCNC: 16 MMOL/L (ref 5–15)
ANISOCYTOSIS BLD QL: NORMAL
BACTERIA UR QL AUTO: ABNORMAL /HPF
BASOPHILS # BLD AUTO: 0.03 10*3/MM3 (ref 0–0.2)
BASOPHILS NFR BLD AUTO: 0.4 % (ref 0–1.5)
BILIRUB UR QL STRIP: NEGATIVE
BUN SERPL-MCNC: 51 MG/DL (ref 8–23)
BUN/CREAT SERPL: 16 (ref 7–25)
CALCIUM SPEC-SCNC: 7.9 MG/DL (ref 8.6–10.5)
CHLORIDE SERPL-SCNC: 100 MMOL/L (ref 98–107)
CLARITY UR: CLEAR
CO2 SERPL-SCNC: 23 MMOL/L (ref 22–29)
COLOR UR: ABNORMAL
CREAT SERPL-MCNC: 3.18 MG/DL (ref 0.57–1)
CREAT UR-MCNC: 98.2 MG/DL
DEPRECATED RDW RBC AUTO: 60.5 FL (ref 37–54)
EGFRCR SERPLBLD CKD-EPI 2021: 15.8 ML/MIN/1.73
EOSINOPHIL # BLD AUTO: 0.15 10*3/MM3 (ref 0–0.4)
EOSINOPHIL NFR BLD AUTO: 2.2 % (ref 0.3–6.2)
ERYTHROCYTE [DISTWIDTH] IN BLOOD BY AUTOMATED COUNT: 20.9 % (ref 12.3–15.4)
GLUCOSE BLDC GLUCOMTR-MCNC: 215 MG/DL (ref 70–130)
GLUCOSE BLDC GLUCOMTR-MCNC: 263 MG/DL (ref 70–130)
GLUCOSE BLDC GLUCOMTR-MCNC: 265 MG/DL (ref 70–130)
GLUCOSE BLDC GLUCOMTR-MCNC: 341 MG/DL (ref 70–130)
GLUCOSE SERPL-MCNC: 201 MG/DL (ref 65–99)
GLUCOSE UR STRIP-MCNC: NEGATIVE MG/DL
HCT VFR BLD AUTO: 33.5 % (ref 34–46.6)
HGB BLD-MCNC: 10 G/DL (ref 12–15.9)
HGB UR QL STRIP.AUTO: ABNORMAL
HYPOCHROMIA BLD QL: NORMAL
IMM GRANULOCYTES # BLD AUTO: 0.03 10*3/MM3 (ref 0–0.05)
IMM GRANULOCYTES NFR BLD AUTO: 0.4 % (ref 0–0.5)
KETONES UR QL STRIP: ABNORMAL
LEUKOCYTE ESTERASE UR QL STRIP.AUTO: ABNORMAL
LYMPHOCYTES # BLD AUTO: 0.61 10*3/MM3 (ref 0.7–3.1)
LYMPHOCYTES NFR BLD AUTO: 8.9 % (ref 19.6–45.3)
MCH RBC QN AUTO: 24 PG (ref 26.6–33)
MCHC RBC AUTO-ENTMCNC: 29.9 G/DL (ref 31.5–35.7)
MCV RBC AUTO: 80.5 FL (ref 79–97)
MONOCYTES # BLD AUTO: 0.63 10*3/MM3 (ref 0.1–0.9)
MONOCYTES NFR BLD AUTO: 9.2 % (ref 5–12)
NEUTROPHILS NFR BLD AUTO: 5.43 10*3/MM3 (ref 1.7–7)
NEUTROPHILS NFR BLD AUTO: 78.9 % (ref 42.7–76)
NITRITE UR QL STRIP: NEGATIVE
NRBC BLD AUTO-RTO: 0 /100 WBC (ref 0–0.2)
PH UR STRIP.AUTO: 6 [PH] (ref 5–8)
PHOSPHATE SERPL-MCNC: 4.2 MG/DL (ref 2.5–4.5)
PLATELET # BLD AUTO: 127 10*3/MM3 (ref 140–450)
PMV BLD AUTO: 11.1 FL (ref 6–12)
POIKILOCYTOSIS BLD QL SMEAR: NORMAL
POTASSIUM SERPL-SCNC: 4.3 MMOL/L (ref 3.5–5.2)
PROT ?TM UR-MCNC: 285.7 MG/DL
PROT UR QL STRIP: ABNORMAL
PROT/CREAT UR: 2909.4 MG/G CREA (ref 0–200)
RBC # BLD AUTO: 4.16 10*6/MM3 (ref 3.77–5.28)
RBC # UR STRIP: ABNORMAL /HPF
REF LAB TEST METHOD: ABNORMAL
SMALL PLATELETS BLD QL SMEAR: NORMAL
SODIUM SERPL-SCNC: 139 MMOL/L (ref 136–145)
SP GR UR STRIP: 1.02 (ref 1–1.03)
SQUAMOUS #/AREA URNS HPF: ABNORMAL /HPF
TROPONIN T SERPL-MCNC: 0.02 NG/ML (ref 0–0.03)
UROBILINOGEN UR QL STRIP: ABNORMAL
WBC # UR STRIP: ABNORMAL /HPF
WBC MORPH BLD: NORMAL
WBC NRBC COR # BLD: 6.88 10*3/MM3 (ref 3.4–10.8)

## 2022-03-05 PROCEDURE — 99233 SBSQ HOSP IP/OBS HIGH 50: CPT | Performed by: INTERNAL MEDICINE

## 2022-03-05 PROCEDURE — 25010000002 FUROSEMIDE PER 20 MG: Performed by: INTERNAL MEDICINE

## 2022-03-05 PROCEDURE — 85025 COMPLETE CBC W/AUTO DIFF WBC: CPT | Performed by: INTERNAL MEDICINE

## 2022-03-05 PROCEDURE — 87186 SC STD MICRODIL/AGAR DIL: CPT | Performed by: INTERNAL MEDICINE

## 2022-03-05 PROCEDURE — 87070 CULTURE OTHR SPECIMN AEROBIC: CPT | Performed by: INTERNAL MEDICINE

## 2022-03-05 PROCEDURE — 81001 URINALYSIS AUTO W/SCOPE: CPT | Performed by: INTERNAL MEDICINE

## 2022-03-05 PROCEDURE — 80048 BASIC METABOLIC PNL TOTAL CA: CPT | Performed by: INTERNAL MEDICINE

## 2022-03-05 PROCEDURE — 85007 BL SMEAR W/DIFF WBC COUNT: CPT | Performed by: INTERNAL MEDICINE

## 2022-03-05 PROCEDURE — 84100 ASSAY OF PHOSPHORUS: CPT | Performed by: INTERNAL MEDICINE

## 2022-03-05 PROCEDURE — 87205 SMEAR GRAM STAIN: CPT | Performed by: INTERNAL MEDICINE

## 2022-03-05 PROCEDURE — 87077 CULTURE AEROBIC IDENTIFY: CPT | Performed by: INTERNAL MEDICINE

## 2022-03-05 PROCEDURE — 82570 ASSAY OF URINE CREATININE: CPT | Performed by: INTERNAL MEDICINE

## 2022-03-05 PROCEDURE — 63710000001 INSULIN ASPART PER 5 UNITS: Performed by: STUDENT IN AN ORGANIZED HEALTH CARE EDUCATION/TRAINING PROGRAM

## 2022-03-05 PROCEDURE — 84156 ASSAY OF PROTEIN URINE: CPT | Performed by: INTERNAL MEDICINE

## 2022-03-05 PROCEDURE — 84484 ASSAY OF TROPONIN QUANT: CPT | Performed by: INTERNAL MEDICINE

## 2022-03-05 PROCEDURE — 25010000002 CEFTRIAXONE SODIUM-DEXTROSE 1-3.74 GM-%(50ML) RECONSTITUTED SOLUTION: Performed by: INTERNAL MEDICINE

## 2022-03-05 PROCEDURE — 82962 GLUCOSE BLOOD TEST: CPT

## 2022-03-05 RX ORDER — MIDODRINE HYDROCHLORIDE 5 MG/1
5 TABLET ORAL
Status: DISCONTINUED | OUTPATIENT
Start: 2022-03-05 | End: 2022-03-06

## 2022-03-05 RX ORDER — FUROSEMIDE 10 MG/ML
80 INJECTION INTRAMUSCULAR; INTRAVENOUS EVERY 6 HOURS
Status: COMPLETED | OUTPATIENT
Start: 2022-03-05 | End: 2022-03-05

## 2022-03-05 RX ADMIN — Medication 3 ML: at 09:13

## 2022-03-05 RX ADMIN — DOCUSATE SODIUM 100 MG: 100 CAPSULE, LIQUID FILLED ORAL at 09:13

## 2022-03-05 RX ADMIN — PANTOPRAZOLE SODIUM 40 MG: 40 TABLET, DELAYED RELEASE ORAL at 06:20

## 2022-03-05 RX ADMIN — FUROSEMIDE 80 MG: 10 INJECTION, SOLUTION INTRAMUSCULAR; INTRAVENOUS at 16:48

## 2022-03-05 RX ADMIN — METOPROLOL TARTRATE 25 MG: 25 TABLET, FILM COATED ORAL at 09:13

## 2022-03-05 RX ADMIN — Medication 1 CAPSULE: at 20:02

## 2022-03-05 RX ADMIN — Medication 3 ML: at 20:02

## 2022-03-05 RX ADMIN — CEFTRIAXONE 1 G: 1 INJECTION, SOLUTION INTRAVENOUS at 15:54

## 2022-03-05 RX ADMIN — FUROSEMIDE 80 MG: 10 INJECTION, SOLUTION INTRAMUSCULAR; INTRAVENOUS at 11:11

## 2022-03-05 RX ADMIN — METOPROLOL TARTRATE 25 MG: 25 TABLET, FILM COATED ORAL at 20:02

## 2022-03-05 RX ADMIN — APIXABAN 5 MG: 5 TABLET, FILM COATED ORAL at 09:13

## 2022-03-05 RX ADMIN — DOCUSATE SODIUM 100 MG: 100 CAPSULE, LIQUID FILLED ORAL at 20:02

## 2022-03-05 RX ADMIN — FUROSEMIDE 80 MG: 10 INJECTION, SOLUTION INTRAMUSCULAR; INTRAVENOUS at 23:34

## 2022-03-05 RX ADMIN — AMIODARONE HYDROCHLORIDE 100 MG: 200 TABLET ORAL at 09:13

## 2022-03-05 RX ADMIN — Medication 1 CAPSULE: at 09:13

## 2022-03-05 RX ADMIN — MIDODRINE HYDROCHLORIDE 5 MG: 5 TABLET ORAL at 16:51

## 2022-03-05 RX ADMIN — MIDODRINE HYDROCHLORIDE 5 MG: 5 TABLET ORAL at 11:12

## 2022-03-05 RX ADMIN — DEXTROSE MONOHYDRATE 100 ML/HR: 100 INJECTION, SOLUTION INTRAVENOUS at 00:26

## 2022-03-05 RX ADMIN — APIXABAN 5 MG: 5 TABLET, FILM COATED ORAL at 20:02

## 2022-03-05 RX ADMIN — LEVOTHYROXINE SODIUM 150 MCG: 150 TABLET ORAL at 06:19

## 2022-03-05 RX ADMIN — ASPIRIN 81 MG: 81 TABLET, CHEWABLE ORAL at 09:13

## 2022-03-05 RX ADMIN — INSULIN ASPART 6 UNITS: 100 INJECTION, SOLUTION INTRAVENOUS; SUBCUTANEOUS at 18:48

## 2022-03-05 NOTE — CONSULTS
[facility]      Nephrology Consultation      Referring Provider:   Marianela Albright APRN    Reason for Consultation:  CKD stage 4 and fluid overload.    Subjective:  Chief complaint   Chief Complaint   Patient presents with   • Altered Mental Status     History of present illness:    Patient is 61-year-old morbidly obese  female with multiple medical problems including stage IV chronic kidney disease and has not followed up in the office for almost 2 years, she has been in and out of the hospital secondary to fluid overload and has gradual worsening of her renal function as well.  She has morbid obesity with a functional quadriplegia wheelchair dependent, untreated sleep apnea, atrial fibrillation, chronic systolic congestive heart failure and type 2 diabetes who was noted to be hypothermic and hypoglycemic at the time of presentation.  Currently she is laying in the bed awake alert and interactive.  When I asked her about dialysis she was very clear about it that she does not want dialysis and if it comes to that she would rather die.  I have reviewed labs/imaging/records from this hospitalization, including ER staff and admitting/attending physicians H/P's and progress notes to establish a comprehensive understanding of this patient's clinical hospital course, as well as to establish plan of care appropriately.   Past Medical History:   Diagnosis Date   • A-fib (HCC)    • Anemia 10/2/2018   • Diabetes mellitus (HCC)    • Disease of thyroid gland    • GERD (gastroesophageal reflux disease)    • Gout    • History of transfusion    • Hypertension        Past Surgical History:   Procedure Laterality Date   • EYE SURGERY     • INCISION AND DRAINAGE LEG Right 1/14/2019    Procedure: Right heel incision and drainage with graft application;  Surgeon: Ar Rueda DPM;  Location: [facility];  Service: Podiatry   • LEG SURGERY       Family History   Problem Relation Age of Onset   • Asthma Mother     • Hypertension Father    • Stroke Father      positive h/o ESRD her sister was on dialysis.    Social History     Tobacco Use   • Smoking status: Never Smoker   • Smokeless tobacco: Never Used   Vaping Use   • Vaping Use: Never used   Substance Use Topics   • Alcohol use: No   • Drug use: No     Home medications:   Prior to Admission Medications     Prescriptions Last Dose Informant Patient Reported? Taking?    acetaminophen (TYLENOL) 325 MG tablet   Yes No    Take 650 mg by mouth Every 4 (Four) Hours As Needed for Mild Pain .    amiodarone (PACERONE) 100 MG tablet   No No    Take 1 tablet by mouth Every Other Day.    ammonium lactate (AMLACTIN) 12 % cream   Yes No    Apply 1 application topically to the appropriate area as directed 2 (Two) Times a Day.    apixaban (ELIQUIS) 5 MG tablet tablet   No No    Take 1 tablet by mouth Every 12 (Twelve) Hours.    arginine 500 MG tablet   Yes No    Take 500 mg by mouth Daily.    aspirin 81 MG chewable tablet   Yes No    Chew 81 mg Daily.    bumetanide (BUMEX) 1 MG tablet   No No    Take 1 tablet by mouth Daily.    docusate sodium (COLACE) 100 MG capsule   Yes No    Take 100 mg by mouth 2 (Two) Times a Day.    Elastic Bandages & Supports (JOBST OPAQUE KNEE 20-30MMHG XL) misc   No No    1 application Daily.    ferrous sulfate 324 (65 Fe) MG tablet delayed-release EC tablet   Yes No    Take 324 mg by mouth 2 (Two) Times a Day With Meals.    insulin aspart (novoLOG) 100 UNIT/ML injection   Yes No    Inject  under the skin into the appropriate area as directed 2 (Two) Times a Day. Sliding scale, low dose     isosorbide mononitrate (ISMO,MONOKET) 10 MG tablet   Yes No    Take 15 mg by mouth Daily.    levothyroxine (SYNTHROID, LEVOTHROID) 150 MCG tablet   Yes No    Take 150 mcg by mouth Daily.    lidocaine (LIDODERM) 5 %   Yes No    Place 1 patch on the skin as directed by provider Daily. Apply patch to left knee daily, on at 0700 am off at 2000    metoprolol tartrate (LOPRESSOR)  100 MG tablet   No No    Take 1 tablet by mouth 2 (Two) Times a Day.    multivitamin (Thera) tablet tablet   Yes No    TAKE 1 TABLET BY MOUTH EVERY DAY    nitroglycerin (NITROSTAT) 0.4 MG SL tablet   Yes No    Place 0.4 mg under the tongue Every 5 (Five) Minutes As Needed for Chest Pain. Take no more than 3 doses in 15 minutes.    Nutritional Supplements (PROTEIN SUPPLEMENT 80% PO)   Yes No    Take 30 mL by mouth 2 (Two) Times a Day.    ondansetron ODT (ZOFRAN-ODT) 4 MG disintegrating tablet   Yes No    DISSOLVE 1 TABLET ON THE TONGUE EVERY 8 HOURS AS NEEDED FOR NAUSEA OR VOMITING    pantoprazole (PROTONIX) 40 MG EC tablet   Yes No    Take 40 mg by mouth Daily.    polyethylene glycol (MIRALAX) packet   Yes No    Take 17 g by mouth 2 (Two) Times a Day.    RA VITAMIN C 500 MG tablet   Yes No    Take 500 mg by mouth Daily.    saccharomyces boulardii (FLORASTOR) 250 MG capsule   Yes No    Take 250 mg by mouth 2 (Two) Times a Day.    simvastatin (ZOCOR) 20 MG tablet   Yes No    Take 20 mg by mouth Every Night.    spironolactone (ALDACTONE) 25 MG tablet   No No    Take 1 tablet by mouth 3 (Three) Times a Week. Monday, Wednesday and Friday.    Zinc Sulfate 220 (50 Zn) MG tablet   Yes No    Take 1 tablet by mouth 2 (Two) Times a Day.        Emergency department medications:   Medications   sodium chloride 0.9 % flush 10 mL (has no administration in time range)   dextrose (D50W) (25 g/50 mL) IV injection 50 mL (50 mL Intravenous Given 3/4/22 1100)   dextrose 10 % infusion (100 mL/hr Intravenous New Bag 3/5/22 0026)   dextrose (GLUTOSE) oral gel 1 tube (has no administration in time range)   dextrose (D50W) (25 g/50 mL) IV injection 25 g (has no administration in time range)   glucagon (human recombinant) (GLUCAGEN DIAGNOSTIC) injection 1 mg (has no administration in time range)   amiodarone (PACERONE) tablet 100 mg (has no administration in time range)   apixaban (ELIQUIS) tablet 5 mg (has no administration in time range)    aspirin chewable tablet 81 mg (has no administration in time range)   docusate sodium (COLACE) capsule 100 mg (has no administration in time range)   levothyroxine (SYNTHROID, LEVOTHROID) tablet 150 mcg (150 mcg Oral Given 3/5/22 0619)   metoprolol tartrate (LOPRESSOR) tablet 100 mg (100 mg Oral Given 3/4/22 2113)   pantoprazole (PROTONIX) EC tablet 40 mg (40 mg Oral Given 3/5/22 0620)   sodium chloride 0.9 % flush 3 mL (3 mL Intravenous Given 3/4/22 2114)   sodium chloride 0.9 % flush 3-10 mL (has no administration in time range)   acetaminophen (TYLENOL) tablet 650 mg (has no administration in time range)     Or   acetaminophen (TYLENOL) 160 MG/5ML solution 650 mg (has no administration in time range)     Or   acetaminophen (TYLENOL) suppository 650 mg (has no administration in time range)   ondansetron (ZOFRAN) injection 4 mg (has no administration in time range)   cefTRIAXone (ROCEPHIN) IVPB 1 g/50ml dextrose (premix) (1 g Intravenous New Bag 3/4/22 1944)   doxycycline (VIBRAMYCIN) 100 mg/100 mL 0.9% NS MBP (100 mg Intravenous New Bag 3/5/22 0440)   lactobacillus acidophilus (RISAQUAD) capsule 1 capsule (has no administration in time range)   silver sulfadiazine (SILVADENE, SSD) 1 % cream 1 application (1 application Topical Given 3/4/22 2113)   Naloxone HCl (NARCAN) 2 MG/2ML injection  - ADS Override Pull (2 mg Intravenous Given 3/4/22 1123)       Allergies:  Metformin and related    Review of Systems    1. Constitutional: Negative for fever and chills.  Denies any diaphoresis. Complains of fatigue and malaise. Denies any unexpected weight change.   2. HENT: Negative for congestion and hearing loss.   3. Eyes: Negative for redness and visual disturbance.   4. Respiratory: Positive for shortness of breath occasional cough. Negative for chest pain  5. Cardiovascular: Negative for chest pain and chest tightness or palpitations.  Worsening of lower extremity edema.  6. Gastrointestinal: Negative for abdominal  "pain or distention, and blood in stool. Denies any Nausea, vomiting, diarrhea or constipation.  7. Endocrine: Negative for heat or cold intolerance.   8. Genitourinary: Negative for difficulty urinating, dysuria and frequency.   9. Musculoskeletal: Positive for arthralgias, back pain.  Denies any myalgias.   10. Skin: Negative for color change, rash and wound.   11. Neurological: Negative for syncope, weakness and headaches.   12. Hematological: Negative for adenopathy. Does not bruise/bleed easily.   13. Psychiatric/Behavioral: Negative for confusion. The patient is not nervous/anxious.     Objective:  Vital Signs  /88 (BP Location: Left arm, Patient Position: Lying)   Pulse 103   Temp 97.9 °F (36.6 °C) (Bladder)   Resp 11   Ht 167.4 cm (65.89\")   Wt (!) 160 kg (353 lb)   SpO2 94%   BMI 57.17 kg/m²          No intake/output data recorded.    Intake/Output Summary (Last 24 hours) at 3/5/2022 0804  Last data filed at 3/5/2022 0600  Gross per 24 hour   Intake 2003 ml   Output 1320 ml   Net 683 ml       Physical Exam:  General Appearance:   Alert, cooperative, in no acute distress.     Head:   Normocephalic, without obvious abnormality, atraumatic.     Eyes:      Normal, conjunctivae and sclerae, no icterus, no pallor, corneas clear, PERRLA        Throat:   Oral mucosa dry      Neck:  No adenopathy, supple, trachea midline, no thyromegaly, no carotid bruit, no JVD        Lungs:    Clear to auscultation and fair air movement noted, with basal crackles.      Heart::   Irregular rhythm and rate.       Abdomen:   Obese. Normal bowel sounds, no masses, no organomegaly, soft non-tender, non-distended, no guarding, no rebound tenderness      Genital urinary:   No urinary bladder palpable      Extremities:  Moves all extremities, 3-4+ edema, no cyanosis, no redness.     Pulses:  Pulses palpable and equal bilaterally but weak.     Skin:  No bleeding, bruising or rash        Neurologic:  Cranial nerves grossly " intact, move all extremities         Results Review:   Results from last 7 days   Lab Units 03/05/22  0435 03/04/22  1108   SODIUM mmol/L 139 138   POTASSIUM mmol/L 4.3 3.8   CHLORIDE mmol/L 100 99   CO2 mmol/L 23.0 23.7   BUN mg/dL 51* 56*   CREATININE mg/dL 3.18* 3.19*   CALCIUM mg/dL 7.9* 8.2*   ALBUMIN g/dL  --  3.60   BILIRUBIN mg/dL  --  0.6   ALK PHOS U/L  --  218*   ALT (SGPT) U/L  --  13   AST (SGOT) U/L  --  24   GLUCOSE mg/dL 201* 52*     Estimated Creatinine Clearance: 28.4 mL/min (A) (by C-G formula based on SCr of 3.18 mg/dL (H)).  Results from last 7 days   Lab Units 03/05/22  0435   PHOSPHORUS mg/dL 4.2         Results from last 7 days   Lab Units 03/05/22  0435 03/04/22  1108   WBC 10*3/mm3 6.88 6.29   HEMOGLOBIN g/dL 10.0* 11.2*   PLATELETS 10*3/mm3 127* 124*         Brief Urine Lab Results  (Last result in the past 365 days)      Color   Clarity   Blood   Leuk Est   Nitrite   Protein   CREAT   Urine HCG        03/04/22 1124 Yellow   Clear   Trace   Negative   Negative   100 mg/dL (2+)               No results found for: UTPCR  Imaging Results (Last 24 Hours)     Procedure Component Value Units Date/Time    CT Head Without Contrast [[id number]] Collected: 03/04/22 1237     Updated: 03/04/22 1240    Narrative:      PROCEDURE: CT HEAD WO CONTRAST-     HISTORY: AMS, altered mental status     COMPARISON: 1/13/2019     TECHNIQUE: Noncontrast exam     FINDINGS: Brain parenchyma is homogeneous without evidence of  hemorrhage, mass effect or edema. No extra-axial abnormality is noted.  Ventricles and cisterns appear normal.     The visualized sinuses, orbits and petrous temporal bones appear  unremarkable.       Impression:      Unremarkable unenhanced CT of the brain.         This study was performed with techniques to keep radiation doses as low  as reasonably achievable (ALARA). Individualized dose reduction  techniques using automated exposure control or adjustment of vA and/or  kV according to the  patient size were employed.      This report was signed and finalized on 3/4/2022 12:38 PM by Fito Andino MD.    XR Chest 1 View [[id number]] Collected: 03/04/22 1138     Updated: 03/04/22 1141    Narrative:      PROCEDURE: XR CHEST 1 VW-     HISTORY: AMS , hypertension     COMPARISON:  1/31/2022     FINDINGS:  Portable view of the chest demonstrates the lungs to be  grossly clear. There is no evidence of effusion, pneumothorax or other  significant pleural disease. The mediastinum is unremarkable.     The heart size is moderately enlarged.       Impression:      No acute findings      This report was signed and finalized on 3/4/2022 11:38 AM by Fito Andino MD.        amiodarone, 100 mg, Oral, Q48H  apixaban, 5 mg, Oral, Q12H  aspirin, 81 mg, Oral, Daily  cefTRIAXone, 1 g, Intravenous, Q24H  docusate sodium, 100 mg, Oral, BID  doxycycline, 100 mg, Intravenous, Q12H  lactobacillus acidophilus, 1 capsule, Oral, BID  levothyroxine, 150 mcg, Oral, Daily  metoprolol tartrate, 100 mg, Oral, BID  pantoprazole, 40 mg, Oral, Daily  silver sulfadiazine, 1 application, Topical, Q24H  sodium chloride, 3 mL, Intravenous, Q12H      dextrose, 100 mL/hr, Last Rate: 100 mL/hr (03/05/22 0026)        Assessment/Plan:    1.   Chronic kidney disease, stage IV (severe) (HCC): Diabetic hypertensive nephropathy may end up progressing to end-stage renal disease, patient does not want dialysis will need palliative care and hospice consult placed.  2.   Obesity, morbid, BMI 50 or higher (HCC): Complicates all forms of care, wheelchair dependent.  3.   A-fib (HCC): Rate controlled and anticoagulation as per cardiology and hospitalist service.  4.   Hypoglycemia: Continue with D10 and will hold off giving any oral hypoglycemic agents.  5.   Acute metabolic encephalopathy: Improved after serum glucose better.  6.   Cellulitis of lower extremity: Currently on IV antibiotics that can be continued and rest of the treatment as per hospitalist  service.  7.   Essential hypertension: Decrease the blood pressure medications and increase diuresis as tolerated.  8.   Hypothermia: Clinically much better.  9.   Acquired hypothyroidism: Continue home dose of Synthroid.  10.   Type 2 diabetes mellitus with nephropathy (HCC): Will not restart long-acting medications, she will need short-acting medications and may end up requiring insulin as well.  11.   Cor pulmonale, chronic (HCC): Longstanding history of sleep apnea with noncompliance with BiPAP.  12.   Chronic venous hypertension involving both sides: Continue to optimize medications.          Risk and complexity: High      Plan:  · Decrease metoprolol to 25 mg twice a day and start her on Lasix 80 mg every 6 hours x3 doses.  Check UA and spot protein creatinine ratio.  Echo is pending.  If blood pressure drops will need albumin instead of having her fluids.  I will also start her on midodrine 5 mg 3 times a day with the hope that it will stabilize the blood pressure.  · Continue with rest of the current treatment plan and surveillance labs.  · Details were discussed with the patient no family in the room.    · Details were also discussed with the hospitalist service.   · Further recommendations will depend on clinical course of the patient during the current hospitalization.    · I also discussed the details with the nursing staff.  · Rest as ordered.    In closing, I sincerely appreciate opportunity to participate in care of this patient. If I can be of any further assistance with the management of this patient, please don’t hesitate to contact me.    Milad Leone MD    03/05/22  08:04 EST    Dictated using Dragon.

## 2022-03-05 NOTE — PROGRESS NOTES
[facility]    Name:  Millicent Mckeon   Age:  61 y.o.  Sex:  female  :  1960  MRN:  [id number]   Visit Number:  [id number]  Admission Date:  3/4/2022  Date Of Service:  22  Primary Care Physician:  Marianela Albright APRN     LOS: 1 day :    Chief Complaint:      Follow-up of encephalopathy and hypothermia.    Subjective:    Ms. Mckeon was seen and examined this morning.  She is completely back to her baseline mental status, sitting up on the bed and eating her breakfast.  She was able to answer all my questions.  She was unable to tell me exactly what happened yesterday.  She denies any chest pain or shortness of breath at this time.  She was seen by Dr. Cutler this morning and he has started her on IV Lasix therapy due to her significant volume overload.  She does live by herself and is wheelchair-bound.  Apparently her brother comes and helps her.  No fevers and her hypothermia has improved overnight.    Hospital Course:    Ms. Mckeon is a chronically ill lady with history of atrial fibrillation, chronic systolic heart failure, chronic kidney disease stage IV, chronic cor pulmonale, morbid obesity, functional quadriplegia who is wheelchair-bound was brought to the emergency room by EMS after she was found on the front door of her house sitting in a wheelchair in the cold and confused.  When the EMS checked her fingerstick glucose and apparently it was in the 50s.  She was also noted to have significant ulcerations and redness in both her lower extremities.     In the emergency room, she was noted to have significant hypothermia with initial temperature of 88.9 and was placed on warming blanket.  Pulse was 80 with a blood pressure of 134/88.  Pulse oxygen saturation was 98% on room air.  Patient was given Narcan without any significant change in her mental status.  Her initial fingerstick was 62 and it dropped to 52 on her blood work and she was started on  dextrose 10% infusion.  Blood work was remarkable for a creatinine of 3.19 (her baseline), BUN 56, BNP 18,778, procalcitonin 0.21.  Lactic acid was 1.1.  ABG done in the emergency room showed a pH of 7.37, PCO2 49, PO2 78 and bicarb of 28.  CT of the head was unremarkable.  CT of the chest was negative for any infiltrates.  Urinalysis was within normal limits.      Patient was admitted to the medical ICU for hypothermia, metabolic encephalopathy, hypoglycemia as well as bilateral lower extremity cellulitis.  She was placed on dextrose infusion and IV antibiotics therapy with Rocephin and doxycycline.  She was placed on warming blanket.  Patient improved significantly over the next 12 hours with regards to her hypothermia as well as her mental status.  She was seen by Dr. Cutler from nephrology and was started on IV diuretic therapy for her significant anasarca.  She was transferred out to the medical floor with telemetry the next day.    Review of Systems:     All systems were reviewed and negative except as mentioned in subjective, assessment and plan.    Vital Signs:    Temp:  [88.8 °F (31.6 °C)-98.4 °F (36.9 °C)] 97.7 °F (36.5 °C)  Heart Rate:  [] 104  Resp:  [9-16] 16  BP: (110-159)/() 126/76    Intake and output:    I/O last 3 completed shifts:  In: 2003 [P.O.:270; I.V.:1633; IV Piggyback:100]  Out: 1320 [Urine:1320]  I/O this shift:  In: 230 [P.O.:230]  Out: -     Physical Examination:    General Appearance:  Alert and cooperative.  Obese but comfortable at rest.   Head:  Atraumatic and normocephalic.   Eyes: Conjunctivae and sclerae normal, no icterus. No pallor.   Throat: No oral lesions, no thrush, oral mucosa moist.   Neck: Supple, trachea midline, no thyromegaly.   Lungs:   Breath sounds heard bilaterally equally.  No wheezing or crackles. No Pleural rub or bronchial breathing.   Heart:  Normal S1 and S2, no murmur, no gallop, no rub. No JVD.   Abdomen:   Normal bowel sounds, no masses, no  organomegaly. Soft, nontender, obese with large pannus, no rebound tenderness.  Wilkinson catheter is in place.   Extremities: Supple, no cyanosis, no clubbing.  3-4+ pitting edema noted in both lower extremities up to mid thighs.   Skin: No bleeding.  Multiple excoriations and small ulcers with yellow slough noted in lower extremities associated with erythema up to her knees.  Multiple excoriations noted on upper extremities in various stages of healing.   Neurologic: Alert and oriented x 3. No facial asymmetry. Moves all four limbs but does have generalized weakness.  No asterixis.      Laboratory results:    Results from last 7 days   Lab Units 03/05/22  0435 03/04/22  1108   SODIUM mmol/L 139 138   POTASSIUM mmol/L 4.3 3.8   CHLORIDE mmol/L 100 99   CO2 mmol/L 23.0 23.7   BUN mg/dL 51* 56*   CREATININE mg/dL 3.18* 3.19*   CALCIUM mg/dL 7.9* 8.2*   BILIRUBIN mg/dL  --  0.6   ALK PHOS U/L  --  218*   ALT (SGPT) U/L  --  13   AST (SGOT) U/L  --  24   GLUCOSE mg/dL 201* 52*     Results from last 7 days   Lab Units 03/05/22  0435 03/04/22  1108   WBC 10*3/mm3 6.88 6.29   HEMOGLOBIN g/dL 10.0* 11.2*   HEMATOCRIT % 33.5* 37.5   PLATELETS 10*3/mm3 127* 124*         Results from last 7 days   Lab Units 03/05/22  0435   TROPONIN T ng/mL 0.020     Results from last 7 days   Lab Units 03/04/22  2247 03/04/22  2239   BLOODCX  No growth at less than 24 hours No growth at less than 24 hours     Results from last 7 days   Lab Units 03/04/22  1140   PH, ARTERIAL pH units 7.373   PO2 ART mm Hg 78.3   PCO2, ARTERIAL mm Hg 48.7*   HCO3 ART mmol/L 28.3*     I have reviewed the patient's laboratory results.    Radiology results:    CT Head Without Contrast    Result Date: 3/4/2022  PROCEDURE: CT HEAD WO CONTRAST-  HISTORY: AMS, altered mental status  COMPARISON: 1/13/2019  TECHNIQUE: Noncontrast exam  FINDINGS: Brain parenchyma is homogeneous without evidence of hemorrhage, mass effect or edema. No extra-axial abnormality is noted.  Ventricles and cisterns appear normal.  The visualized sinuses, orbits and petrous temporal bones appear unremarkable.      Impression: Unremarkable unenhanced CT of the brain.   This study was performed with techniques to keep radiation doses as low as reasonably achievable (ALARA). Individualized dose reduction techniques using automated exposure control or adjustment of vA and/or kV according to the patient size were employed.  This report was signed and finalized on 3/4/2022 12:38 PM by Fito Andino MD.    XR Chest 1 View    Result Date: 3/4/2022  PROCEDURE: XR CHEST 1 VW-  HISTORY: AMS , hypertension  COMPARISON:  1/31/2022  FINDINGS:  Portable view of the chest demonstrates the lungs to be grossly clear. There is no evidence of effusion, pneumothorax or other significant pleural disease. The mediastinum is unremarkable.  The heart size is moderately enlarged.      Impression: No acute findings  This report was signed and finalized on 3/4/2022 11:38 AM by Fito Andino MD.    I have reviewed the patient's radiology reports.    Medication Review:     I have reviewed the patient's active and prn medications.     Problem List:      Acute metabolic encephalopathy    Cellulitis of lower extremity    Hypothermia    Hypoglycemia    Essential hypertension    Acquired hypothyroidism    Type 2 diabetes mellitus with nephropathy (HCC)    Cor pulmonale, chronic (HCC)    Chronic venous hypertension involving both sides    Obesity, morbid, BMI 50 or higher (HCC)    A-fib (HCC)    Chronic kidney disease, stage IV (severe) ([facility])    Assessment:    1. Acute metabolic encephalopathy secondary to #2 and 3, POA, improved.  2. Severe hypothermia, likely environmental, POA, resolved.  3. Acute hypoglycemia, POA, resolved.  4. Bilateral lower extremity cellulitis.  5. Chronic kidney disease stage IV.  6. Chronic systolic heart failure with ejection fraction of 35%.  7. Paroxysmal atrial fibrillation on apixaban.  8. Chronic cor  pulmonale.  9. Chronic hypoxic respiratory failure on home oxygen.  10. Diabetes mellitus type 2 with nephropathy.  11. Chronic venous insufficiency of the lower extremities.  12. Morbid obesity with a BMI of 56.  13. Anemia of chronic kidney disease.  14. Functional quadriplegia.  15. Acquired hypothyroidism.    Plan:    Metabolic encephalopathy/hypoglycemia/hypothermia.  -Significantly improved and her mental status is back to baseline.  -We will discontinue dextrose and hypoglycemia protocol.  -She will be placed on subcutaneous insulin protocol for coverage.  -Hemoglobin A1c 8.5 on 3/4/2022.     Bilateral lower extremity cellulitis.  -She does have significant excoriations and cellulitis of the lower extremity.  -Continue Rocephin and doxycycline (day 2).  -We will obtain wound cultures.  -Continue lactobacillus supplements.  -We will get wound care consult.     Congestive heart failure/volume overload/CKD 4.  -Continue Lasix 80 mg every 6 hours for 3 doses.  -I have discussed the patient's condition and treatment plan with Dr. Cutler.  -2D echocardiogram was done on 3/4/2022 and the report is currently pending.    She will be transferred to the medical floor with telemetry.  We will start her on physical and occupational therapy from tomorrow.    I discussed advanced directives with the patient and she told me that she does not want to be on dialysis and does not want intubation or CPR.  She wants to be DNR.  I discussed with the patient's healthcare power of  and niece Val over the phone today.  I informed her that the patient wants to be DNR.    DVT Prophylaxis: Apixaban  Code Status: DNR/DNI  Diet: Diabetic renal  Discharge Plan: Pending-hopefully home with home health in the next 3 to 4 days.    Samson Reyes MD  03/05/22  13:36 EST    Dictated utilizing Dragon dictation.

## 2022-03-05 NOTE — PLAN OF CARE
Goal Outcome Evaluation:  Plan of Care Reviewed With: patient           Outcome Evaluation: pt is more alert, has some sleep apnea, legs were medicated and wrapped, frequent turns encouraged, Sandra Hugger off, Temp WNL VSS

## 2022-03-05 NOTE — CASE MANAGEMENT/SOCIAL WORK
Discharge Planning Assessment   [facility]     Patient Name: Millicent Mckeon  MRN: [id number]  Today's Date: 3/5/2022    Admit Date: 3/4/2022     Discharge Needs Assessment     Row Name 22 1458       Living Environment    People in Home alone    Unique Family Situation stated lost her two sisters who lived with her in past year    Current Living Arrangements home    Primary Care Provided by self    Provides Primary Care For no one    Family Caregiver if Needed sibling(s);other relative(s)    Quality of Family Relationships supportive    Able to Return to Prior Arrangements yes    Living Arrangement Comments plans to return home on d/c; Val bellamy will transport       Resource/Environmental Concerns    Resource/Environmental Concerns none    Transportation Concerns no car       Transition Planning    Patient/Family Anticipates Transition to home with family    Patient/Family Anticipated Services at Transition     Transportation Anticipated family or friend will provide       Discharge Needs Assessment    Readmission Within the Last 30 Days no previous admission in last 30 days    Equipment Currently Used at Home glucometer;commode;oxygen  utilizes 3l o2 of a night; unsure of anita dme    Concerns to be Addressed discharge planning    Anticipated Changes Related to Illness none    Equipment Needed After Discharge none    Provided Post Acute Provider List? N/A    Provided Post Acute Provider Quality & Resource List? N/A               Discharge Plan     Row Name 22 1501       Plan    Plan pt resting in bed; stated plans to go home on d/c; lives alone, just lost her two roommates/sisters in past year, both ; stated brother Adolfo lives near her and assist with groceries and paying bills, ; niece Val will transport her home and assist her also; otherwise she cares for self at home, able to cook, clean, fix meds; stated no issues at home needing assist with and able to pay her  bills; stated physical address is [address] [place] [address] [address] [place] [address]; completed imm with pt; utilizes o2 3l at night when needed, unsure of dme but in [place]; also has glucometer and bsc she utilizes at home; no other needs at this time; cm will continue to follow              Continued Care and Services - Admitted Since 3/4/2022    Coordination has not been started for this encounter.          Demographic Summary     Row Name 03/05/22 1456       General Information    Admission Type inpatient    Arrived From emergency department    Required Notices Provided Important Message from Medicare  completed imm with pt    Referral Source admission list    Reason for Consult discharge planning    Preferred Language English       Contact Information    Permission Granted to Share Info With family/designee  josesito Neal               Functional Status     Row Name 03/05/22 1458       Functional Status    Usual Activity Tolerance fair    Current Activity Tolerance fair       Functional Status, IADL    Medications independent    Meal Preparation independent    Housekeeping independent    Laundry independent    Shopping completely dependent    IADL Comments stated brother takes her to appointments, gets her groceries and goes and pays her bills for her       Mental Status    General Appearance WDL WDL       Mental Status Summary    Recent Changes in Mental Status/Cognitive Functioning no changes                    Kayley Mcnair, RN

## 2022-03-05 NOTE — PLAN OF CARE
Goal Outcome Evaluation:  Plan of Care Reviewed With: family        Progress: improving  Outcome Summary: VSS, temp increasing, pt alert and able to answer questions. Will continue to monitor.

## 2022-03-05 NOTE — CONSULTS
"Adult Nutrition  Assessment/PES    Patient Name:  Millicent Mckeon  YOB: 1960  MRN: [id number]  Admit Date:  3/4/2022    Assessment Date:  3/5/2022    Comments:    Recommend:  1. Continue current diet order as medically appropriate and tolerated.  2. Establish and encourage PO intake as medically appropriate and tolerated. PO intake average ~100% x 2 meals.   3. RD ordered Arginaid TID.  4. Consider a renal multivitamin with minerals daily.  5. Continue to monitor and replace electrolytes PRN.    RD to follow pt and available PRN.     Reason for Assessment     Row Name 03/05/22 1054          Reason for Assessment    Reason For Assessment diagnosis/disease state;per organizational policy;nurse/nurse practitioner consult;identified at risk by screening criteria     Diagnosis diabetes diagnosis/complications;cardiac disease;renal disease     Identified At Risk by Screening Criteria large or nonhealing wound, burn or pressure injury                  Anthropometrics     Row Name 03/05/22 1057 03/05/22 0500       Anthropometrics    Height 167.4 cm (65.89\") --    Weight 160 kg (352 lb 11.8 oz) 160 kg (353 lb)    Age for Calculations 61 --    Height for Calculation 1.674 m (5' 5.89\") --    Weight for Calculation 150 kg (330 lb) --       Ideal Body Weight (IBW)    Retired Ideal Body Weight (IBW) (kg) 59.32 --    Retired % Ideal Body Weight 269.7 --       Retired Body Mass Index (BMI)    Retired BMI (kg/m2) 57.24 --               Labs/Tests/Procedures/Meds     Row Name 03/05/22 1055          Labs/Procedures/Meds    Lab Results Reviewed reviewed, pertinent     Lab Results Comments Low: Platelets; High: Crt, Glu, A1c, BUN            Medications    Pertinent Medications Reviewed reviewed, pertinent     Pertinent Medications Comments Pacerone, Rocephin, dextrose, Vibramycin, Risaquad, pantoprazole, NaCl                Physical Findings     Row Name 03/05/22 1056          Physical Findings    Overall Physical " "Appearance 2+ generalized edema, lft/rt lower leg diabetic ulcer, functional quadriplegic, teeth missing     Additional Documentation Fluid Accumulation (Group)                Estimated/Assessed Needs - Anthropometrics     Row Name 03/05/22 1057 03/05/22 0500       Anthropometrics    Height 167.4 cm (65.89\") --    Weight 160 kg (352 lb 11.8 oz) 160 kg (353 lb)    Age for Calculations 61 --    Height for Calculation 1.674 m (5' 5.89\") --    Weight for Calculation 150 kg (330 lb) --       Estimated/Assessed Needs    Additional Documentation Estimated Calorie Needs (Group);KCAL/KG (Group);Protein Requirements (Group);[place]-St[place] Jeor Equation (Group);Fluid Requirements (Group) --       Estimated Calorie Needs    Estimated Calorie Requirement (kcal/day) 2750 --    Estimated Calorie Need Method kcal/kg;[place]-St Jeor --       KCAL/KG    KCAL/KG 20 Kcal/Kg (kcal);18 Kcal/Kg (kcal);25 Kcal/Kg (kcal) --    18 Kcal/Kg (kcal) 2694.366 --    20 Kcal/Kg (kcal) 2993.74 --    25 Kcal/Kg (kcal) 3742.175 --       [place]-St. Jeor Equation    [place]-St. Jeor Activity Factors 1.2 --    Activity Factors ([place]-St. Jeor) 0 --       Protein Requirements    Weight Used For Protein Calculations 150 kg (330 lb) --    Est Protein Requirement Amount (gms/kg) 1.0 gm protein --    Estimated Protein Requirements (gms/day) 149.69 --       Fluid Requirements    Fluid Requirements (mL/day) 1500 --    Estimated Fluid Requirement Method other (see comments)  fluid restriction r/t fluid accumulation --    RDA Method (mL) 1500 --               Nutrition Prescription Ordered     Row Name 03/05/22 1102          Nutrition Prescription PO    Current PO Diet Regular     Fluid Consistency Thin     Common Modifiers Cardiac;Consistent Carbohydrate;Low Potassium                Evaluation of Received Nutrient/Fluid Intake     Row Name 03/05/22 1057          PO Evaluation    Number of Days PO Intake Evaluated 1 day     Number of Meals 2     % PO Intake 100  "           EN Evaluation    HOB Greater than or equal to 30 degress                     Problem/Interventions:   Problem 1     Row Name 03/05/22 1103          Nutrition Diagnoses Problem 1    Problem 1 Limited Adherence to Diet Modifications     Etiology (related to) Medical Diagnosis;MNT for Treatment/Condition     Cardiac CHF;SHF;HTN     Endocrine DM2     Fluid Status Edema;Volume overload     Renal CKD     Skin Skin breakdown  diabetic ulcer - left/right lower legs     Signs/Symptoms (evidenced by) Potential Information Deficit;Report/Observation;Biochemical     Reported/Observed By RD/Tech     Specific Labs Noted HgbA1C;Glucose                      Intervention Goal     Row Name 03/05/22 1106          Intervention Goal    General Maintain nutrition;Improved nutrition related lab(s);Meet nutritional needs for age/condition;Disease management/therapy     PO Establish PO;Tolerate PO;Meet estimated needs     Weight No significant weight loss                Nutrition Intervention     Row Name 03/05/22 1106          Nutrition Intervention    RD/Tech Action Follow Tx progress;Care plan reviewd;Encourage intake;Recommend/ordered     Recommended/Ordered Diet;Supplement;Snack                Nutrition Prescription     Row Name 03/05/22 1106          Nutrition Prescription PO    PO Prescription Begin/change supplement;Begin/change diet     Begin/Change Diet to Regular     Fluid Consistency Thin     Supplement Other (comment)  arginaid TID     Supplement Frequency 3 times a day     Common Modifiers Cardiac;Consistent Carbohydrate;Renal;Low Potassium     Low Potassium Details 2 gm (50 mEq) Potassium     New PO Prescription Ordered? No, recommended            Other Orders    Obtain Weight Daily     Obtain Weight Ordered? No, recommended     Supplement Vitamin mineral supplement     Supplement Ordered? No, recommended     Other continue to monitor and replace electrolytes PRN                Education/Evaluation     Row Name  03/05/22 1107          Education    Education Education not appropriate at this time     Please explain Defer until post ICU            Monitor/Evaluation    Monitor Per protocol;I&O;PO intake;Supplement intake;Pertinent labs;Weight;Skin status;Symptoms                 Electronically signed by:  Amina Francois RD  03/05/22 11:08 EST

## 2022-03-05 NOTE — PLAN OF CARE
Goal Outcome Evaluation:  Plan of Care Reviewed With: patient        Progress: improving  Outcome Evaluation: Patients vital signs stable. Blood sugar remains elevated. Patient is alert and oriented. Will continue to monitor.

## 2022-03-06 LAB
ANION GAP SERPL CALCULATED.3IONS-SCNC: 13 MMOL/L (ref 5–15)
BUN SERPL-MCNC: 56 MG/DL (ref 8–23)
BUN/CREAT SERPL: 16.8 (ref 7–25)
CALCIUM SPEC-SCNC: 7.9 MG/DL (ref 8.6–10.5)
CHLORIDE SERPL-SCNC: 99 MMOL/L (ref 98–107)
CO2 SERPL-SCNC: 25 MMOL/L (ref 22–29)
CREAT SERPL-MCNC: 3.33 MG/DL (ref 0.57–1)
EGFRCR SERPLBLD CKD-EPI 2021: 15 ML/MIN/1.73
GLUCOSE BLDC GLUCOMTR-MCNC: 176 MG/DL (ref 70–130)
GLUCOSE BLDC GLUCOMTR-MCNC: 193 MG/DL (ref 70–130)
GLUCOSE BLDC GLUCOMTR-MCNC: 250 MG/DL (ref 70–130)
GLUCOSE BLDC GLUCOMTR-MCNC: 334 MG/DL (ref 70–130)
GLUCOSE SERPL-MCNC: 301 MG/DL (ref 65–99)
POTASSIUM SERPL-SCNC: 4.6 MMOL/L (ref 3.5–5.2)
PROCALCITONIN SERPL-MCNC: 0.2 NG/ML (ref 0–0.25)
SODIUM SERPL-SCNC: 137 MMOL/L (ref 136–145)

## 2022-03-06 PROCEDURE — 80048 BASIC METABOLIC PNL TOTAL CA: CPT | Performed by: INTERNAL MEDICINE

## 2022-03-06 PROCEDURE — 82962 GLUCOSE BLOOD TEST: CPT

## 2022-03-06 PROCEDURE — 25010000002 CEFTRIAXONE SODIUM-DEXTROSE 1-3.74 GM-%(50ML) RECONSTITUTED SOLUTION: Performed by: INTERNAL MEDICINE

## 2022-03-06 PROCEDURE — 63710000001 INSULIN ASPART PER 5 UNITS: Performed by: STUDENT IN AN ORGANIZED HEALTH CARE EDUCATION/TRAINING PROGRAM

## 2022-03-06 PROCEDURE — 63710000001 INSULIN DETEMIR PER 5 UNITS: Performed by: INTERNAL MEDICINE

## 2022-03-06 PROCEDURE — 99232 SBSQ HOSP IP/OBS MODERATE 35: CPT | Performed by: INTERNAL MEDICINE

## 2022-03-06 PROCEDURE — 25010000002 FUROSEMIDE PER 20 MG: Performed by: INTERNAL MEDICINE

## 2022-03-06 PROCEDURE — 84145 PROCALCITONIN (PCT): CPT | Performed by: INTERNAL MEDICINE

## 2022-03-06 RX ORDER — FUROSEMIDE 10 MG/ML
120 INJECTION INTRAMUSCULAR; INTRAVENOUS EVERY 6 HOURS
Status: DISCONTINUED | OUTPATIENT
Start: 2022-03-06 | End: 2022-03-06

## 2022-03-06 RX ORDER — MIDODRINE HYDROCHLORIDE 5 MG/1
2.5 TABLET ORAL
Status: DISCONTINUED | OUTPATIENT
Start: 2022-03-06 | End: 2022-03-07 | Stop reason: HOSPADM

## 2022-03-06 RX ORDER — FERROUS SULFATE TAB EC 324 MG (65 MG FE EQUIVALENT) 324 (65 FE) MG
324 TABLET DELAYED RESPONSE ORAL 2 TIMES DAILY WITH MEALS
Status: DISCONTINUED | OUTPATIENT
Start: 2022-03-06 | End: 2022-03-07 | Stop reason: HOSPADM

## 2022-03-06 RX ORDER — ISOSORBIDE MONONITRATE 20 MG/1
10 TABLET ORAL DAILY
Status: DISCONTINUED | OUTPATIENT
Start: 2022-03-06 | End: 2022-03-07 | Stop reason: HOSPADM

## 2022-03-06 RX ORDER — ATORVASTATIN CALCIUM 10 MG/1
10 TABLET, FILM COATED ORAL NIGHTLY
Refills: 0 | Status: DISCONTINUED | OUTPATIENT
Start: 2022-03-06 | End: 2022-03-07 | Stop reason: HOSPADM

## 2022-03-06 RX ORDER — METOLAZONE 5 MG/1
10 TABLET ORAL DAILY
Status: COMPLETED | OUTPATIENT
Start: 2022-03-06 | End: 2022-03-07

## 2022-03-06 RX ORDER — METOPROLOL SUCCINATE 100 MG/1
100 TABLET, EXTENDED RELEASE ORAL
Status: DISCONTINUED | OUTPATIENT
Start: 2022-03-06 | End: 2022-03-07 | Stop reason: HOSPADM

## 2022-03-06 RX ORDER — ISOSORBIDE MONONITRATE 10 MG/1
15 TABLET ORAL DAILY
Status: DISCONTINUED | OUTPATIENT
Start: 2022-03-06 | End: 2022-03-06 | Stop reason: RX

## 2022-03-06 RX ADMIN — SILVER SULFADIAZINE 1 APPLICATION: 10 CREAM TOPICAL at 08:40

## 2022-03-06 RX ADMIN — INSULIN DETEMIR 20 UNITS: 100 INJECTION, SOLUTION SUBCUTANEOUS at 08:30

## 2022-03-06 RX ADMIN — PANTOPRAZOLE SODIUM 40 MG: 40 TABLET, DELAYED RELEASE ORAL at 06:39

## 2022-03-06 RX ADMIN — METOLAZONE 10 MG: 5 TABLET ORAL at 10:46

## 2022-03-06 RX ADMIN — FUROSEMIDE 120 MG: 10 INJECTION, SOLUTION INTRAMUSCULAR; INTRAVENOUS at 17:23

## 2022-03-06 RX ADMIN — MIDODRINE HYDROCHLORIDE 2.5 MG: 5 TABLET ORAL at 10:46

## 2022-03-06 RX ADMIN — APIXABAN 5 MG: 5 TABLET, FILM COATED ORAL at 20:59

## 2022-03-06 RX ADMIN — MIDODRINE HYDROCHLORIDE 2.5 MG: 5 TABLET ORAL at 17:23

## 2022-03-06 RX ADMIN — METOPROLOL TARTRATE 25 MG: 25 TABLET, FILM COATED ORAL at 08:33

## 2022-03-06 RX ADMIN — FERROUS SULFATE TAB EC 324 MG (65 MG FE EQUIVALENT) 324 MG: 324 (65 FE) TABLET DELAYED RESPONSE at 10:46

## 2022-03-06 RX ADMIN — Medication 1 CAPSULE: at 20:59

## 2022-03-06 RX ADMIN — ATORVASTATIN CALCIUM 10 MG: 10 TABLET, FILM COATED ORAL at 20:59

## 2022-03-06 RX ADMIN — LEVOTHYROXINE SODIUM 150 MCG: 150 TABLET ORAL at 06:39

## 2022-03-06 RX ADMIN — ACETAMINOPHEN 650 MG: 325 TABLET ORAL at 17:41

## 2022-03-06 RX ADMIN — Medication 1 CAPSULE: at 08:33

## 2022-03-06 RX ADMIN — METOPROLOL SUCCINATE 100 MG: 100 TABLET, EXTENDED RELEASE ORAL at 10:46

## 2022-03-06 RX ADMIN — Medication 3 ML: at 08:40

## 2022-03-06 RX ADMIN — DOCUSATE SODIUM 100 MG: 100 CAPSULE, LIQUID FILLED ORAL at 08:34

## 2022-03-06 RX ADMIN — DOCUSATE SODIUM 100 MG: 100 CAPSULE, LIQUID FILLED ORAL at 20:59

## 2022-03-06 RX ADMIN — INSULIN ASPART 7 UNITS: 100 INJECTION, SOLUTION INTRAVENOUS; SUBCUTANEOUS at 06:39

## 2022-03-06 RX ADMIN — CEFTRIAXONE 1 G: 1 INJECTION, SOLUTION INTRAVENOUS at 15:30

## 2022-03-06 RX ADMIN — APIXABAN 5 MG: 5 TABLET, FILM COATED ORAL at 08:33

## 2022-03-06 RX ADMIN — MIDODRINE HYDROCHLORIDE 5 MG: 5 TABLET ORAL at 06:39

## 2022-03-06 RX ADMIN — FERROUS SULFATE TAB EC 324 MG (65 MG FE EQUIVALENT) 324 MG: 324 (65 FE) TABLET DELAYED RESPONSE at 17:23

## 2022-03-06 RX ADMIN — ISOSORBIDE MONONITRATE 10 MG: 20 TABLET ORAL at 10:46

## 2022-03-06 RX ADMIN — Medication 3 ML: at 21:00

## 2022-03-06 RX ADMIN — INSULIN ASPART 6 UNITS: 100 INJECTION, SOLUTION INTRAVENOUS; SUBCUTANEOUS at 12:13

## 2022-03-06 RX ADMIN — ASPIRIN 81 MG: 81 TABLET, CHEWABLE ORAL at 08:33

## 2022-03-06 RX ADMIN — FUROSEMIDE 120 MG: 10 INJECTION, SOLUTION INTRAMUSCULAR; INTRAVENOUS at 22:09

## 2022-03-06 RX ADMIN — FUROSEMIDE 120 MG: 10 INJECTION, SOLUTION INTRAMUSCULAR; INTRAVENOUS at 10:47

## 2022-03-06 RX ADMIN — INSULIN ASPART 2 UNITS: 100 INJECTION, SOLUTION INTRAVENOUS; SUBCUTANEOUS at 17:23

## 2022-03-06 NOTE — THERAPY EVALUATION
PT evaluation order received but pt declined to start PT this date as she felt she was still too fatigued. Pt was informed that PT would be back tomorrow to perform evaluation and pt verbalized agreement.

## 2022-03-06 NOTE — PROGRESS NOTES
[facility] Progress Note  [facility]. [place]        Patient Name: Millicent Mckeon  : 1960  MRN: [id number]   LOS: 2 days    Patient Care Team:  Marianela Albright APRN as PCP - General (Family Medicine)  Geremias Shepherd MD as Consulting Physician (General Surgery)  Lisa Cardoso MD as Surgeon (General Surgery)    Chief Complaint:    Chief Complaint   Patient presents with   • Altered Mental Status     Primary Care Physician:  Marianela Albright APRN  Date of admission: 3/4/2022    Subjective     Interval History:   Events noted from last 24 hours.  She is at about her baseline, she still was fairly adamant about not doing dialysis even if she has to do it.  I called her POA and she said she will talk to her today and was thinking about if it is possible to do home dialysis when the time comes.  I told her that she has no ability to do anything for herself and if there is somebody who can help her for sure she can do it at home.  She denies any fever chills or shortness of breath.    Review of Systems:   As noted above    Objective     Vitals:   Temp:  [97.52 °F (36.4 °C)-98 °F (36.7 °C)] 98 °F (36.7 °C)  Heart Rate:  [104-106] 106  Resp:  [12-18] 18  BP: (118-159)/() 136/91    Intake/Output Summary (Last 24 hours) at 3/6/2022 0956  Last data filed at 3/6/2022 0800  Gross per 24 hour   Intake 1280 ml   Output 1400 ml   Net -120 ml       Physical Exam:    General Appearance: alert, oriented x 3, no acute distress   Skin: warm and dry  HEENT: oral mucosa normal, nonicteric sclera  Neck: supple, no JVD  Lungs: CTA, does have bibasilar crackles no rhonchi.  Heart: RRR, normal S1 and S2  Abdomen: soft, nontender, nondistended  : no palpable bladder  Extremities: 2+ edema, cyanosis or clubbing  Neuro: normal speech and mental status     Scheduled Meds:     Current Facility-Administered Medications   Medication Dose Route Frequency Provider Last Rate Last Admin   •  acetaminophen (TYLENOL) tablet 650 mg  650 mg Oral Q4H PRN Samson Reyes MD        Or   • acetaminophen (TYLENOL) 160 MG/5ML solution 650 mg  650 mg Oral Q4H PRN Samson Reyes MD        Or   • acetaminophen (TYLENOL) suppository 650 mg  650 mg Rectal Q4H PRN Samson Reyes MD       • amiodarone (PACERONE) tablet 100 mg  100 mg Oral Q48H Samson Reyes MD   100 mg at 03/05/22 0913   • apixaban (ELIQUIS) tablet 5 mg  5 mg Oral Q12H Samson Reyes MD   5 mg at 03/06/22 0833   • aspirin chewable tablet 81 mg  81 mg Oral Daily Samson Reyes MD   81 mg at 03/06/22 0833   • atorvastatin (LIPITOR) tablet 10 mg  10 mg Oral Nightly Samson Reyes MD       • cefTRIAXone (ROCEPHIN) IVPB 1 g/50ml dextrose (premix)  1 g Intravenous Q24H Samson Reyes  mL/hr at 03/05/22 1554 1 g at 03/05/22 1554   • dextrose (D50W) (25 g/50 mL) IV injection 25 g  25 g Intravenous Q15 Min PRN Samson Reyes MD       • dextrose (D50W) (25 g/50 mL) IV injection 50 mL  50 mL Intravenous Q1H PRN Samson Reyes MD   50 mL at 03/04/22 1100   • dextrose (GLUTOSE) oral gel 1 tube  1 tube Oral Q15 Min PRN Samson Reyes MD       • docusate sodium (COLACE) capsule 100 mg  100 mg Oral BID Samson Reyes MD   100 mg at 03/06/22 0834   • doxycycline (VIBRAMYCIN) 100 mg/100 mL 0.9% NS MBP  100 mg Intravenous Q12H Samson Reyes MD   100 mg at 03/06/22 0438   • ferrous sulfate EC tablet 324 mg  324 mg Oral BID With Meals Samson Reyes MD       • glucagon (human recombinant) (GLUCAGEN DIAGNOSTIC) injection 1 mg  1 mg Subcutaneous PRN Samson Reyes MD       • insulin aspart (novoLOG) injection 0-9 Units  0-9 Units Subcutaneous TID Giana Padgett DO   7 Units at 03/06/22 0639   • insulin detemir (LEVEMIR) injection 20 Units  20 Units Subcutaneous Daily Samson Reyes MD   20 Units at 03/06/22 0830   • isosorbide mononitrate (ISMO,MONOKET) tablet 15 mg  15 mg Oral Daily Samson Reyes MD       • lactobacillus acidophilus (RISAQUAD) capsule 1 capsule  1 capsule Oral  BID Samson Reyes MD   1 capsule at 03/06/22 0833   • levothyroxine (SYNTHROID, LEVOTHROID) tablet 150 mcg  150 mcg Oral Daily Samson Reyes MD   150 mcg at 03/06/22 0639   • metoprolol succinate XL (TOPROL-XL) 24 hr tablet 100 mg  100 mg Oral Q24H Samson Reyes MD       • midodrine (PROAMATINE) tablet 2.5 mg  2.5 mg Oral TID AC Samson Reyes MD       • ondansetron (ZOFRAN) injection 4 mg  4 mg Intravenous Q6H PRN Samson Reyes MD       • pantoprazole (PROTONIX) EC tablet 40 mg  40 mg Oral Daily Samson Reyes MD   40 mg at 03/06/22 0639   • silver sulfadiazine (SILVADENE, SSD) 1 % cream 1 application  1 application Topical Q24H Samson Reyes MD   1 application at 03/06/22 0840   • sodium chloride 0.9 % flush 10 mL  10 mL Intravenous PRN Samson Reyes MD       • sodium chloride 0.9 % flush 3 mL  3 mL Intravenous Q12H Samson Reyes MD   3 mL at 03/06/22 0840   • sodium chloride 0.9 % flush 3-10 mL  3-10 mL Intravenous PRN Samson Reyes MD           amiodarone, 100 mg, Oral, Q48H  apixaban, 5 mg, Oral, Q12H  aspirin, 81 mg, Oral, Daily  atorvastatin, 10 mg, Oral, Nightly  cefTRIAXone, 1 g, Intravenous, Q24H  docusate sodium, 100 mg, Oral, BID  doxycycline, 100 mg, Intravenous, Q12H  ferrous sulfate, 324 mg, Oral, BID With Meals  insulin aspart, 0-9 Units, Subcutaneous, TID AC  insulin detemir, 20 Units, Subcutaneous, Daily  isosorbide mononitrate, 15 mg, Oral, Daily  lactobacillus acidophilus, 1 capsule, Oral, BID  levothyroxine, 150 mcg, Oral, Daily  metoprolol succinate XL, 100 mg, Oral, Q24H  midodrine, 2.5 mg, Oral, TID AC  pantoprazole, 40 mg, Oral, Daily  silver sulfadiazine, 1 application, Topical, Q24H  sodium chloride, 3 mL, Intravenous, Q12H        IV Meds:        Results Reviewed:   I have personally reviewed the results from the time of this admission to 3/6/2022 09:56 EST     Results from last 7 days   Lab Units 03/06/22  0552 03/05/22  0435 03/04/22  1108   SODIUM mmol/L 137 139 138   POTASSIUM mmol/L 4.6  4.3 3.8   CHLORIDE mmol/L 99 100 99   CO2 mmol/L 25.0 23.0 23.7   BUN mg/dL 56* 51* 56*   CREATININE mg/dL 3.33* 3.18* 3.19*   CALCIUM mg/dL 7.9* 7.9* 8.2*   BILIRUBIN mg/dL  --   --  0.6   ALK PHOS U/L  --   --  218*   ALT (SGPT) U/L  --   --  13   AST (SGOT) U/L  --   --  24   GLUCOSE mg/dL 301* 201* 52*       Estimated Creatinine Clearance: 27.1 mL/min (A) (by C-G formula based on SCr of 3.33 mg/dL (H)).    Results from last 7 days   Lab Units 03/05/22  0435   PHOSPHORUS mg/dL 4.2             Results from last 7 days   Lab Units 03/05/22  0435 03/04/22  1108   WBC 10*3/mm3 6.88 6.29   HEMOGLOBIN g/dL 10.0* 11.2*   PLATELETS 10*3/mm3 127* 124*             Brief Urine Lab Results  (Last result in the past 365 days)      Color   Clarity   Blood   Leuk Est   Nitrite   Protein   CREAT   Urine HCG        03/05/22 1113 Orange   Clear   Large (3+)   Small (1+)   Negative   >=300 mg/dL (3+)                 Protein/Creatinine Ratio, Urine   Date Value Ref Range Status   03/05/2022 2,909.4 (H) 0.0 - 200.0 mg/G Crea Final       Imaging Results (Last 24 Hours)     ** No results found for the last 24 hours. **              Assessment / Plan     ASSESSMENT:    Chronic kidney disease, stage IV (severe) ([facility]): Diabetic hypertensive nephropathy may end up progressing to end-stage renal disease, patient does not want dialysis will need palliative care and hospice consult placed.    Obesity, morbid, BMI 50 or higher ([facility]): Complicates all forms of care, wheelchair dependent.    A-fib ([facility]): Rate controlled and anticoagulation as per cardiology and hospitalist service.    Hypoglycemia: Continue with D10 and will hold off giving any oral hypoglycemic agents.    Acute metabolic encephalopathy: Improved after serum glucose better.    Cellulitis of lower extremity: Currently on IV antibiotics that can be continued and rest of the treatment as per hospitalist service.    Essential hypertension: Decrease the blood pressure medications and  increase diuresis as tolerated.    Hypothermia: Clinically much better.    Acquired hypothyroidism: Continue home dose of Synthroid.    Type 2 diabetes mellitus with nephropathy (HCC): Will not restart long-acting medications, she will need short-acting medications and may end up requiring insulin as well.    Cor pulmonale, chronic (HCC): Longstanding history of sleep apnea with noncompliance with BiPAP.    Chronic venous hypertension involving both sides: Continue to optimize medications.      PLAN:  I will go ahead and give her Lasix 120 mg every 6 hours x3 doses and return of metolazone and see how she will do with it.  She still has a fair amount of fluid and has been in negative fluid balance over the last 24 hours.  We will keep the Wilkinson catheter in for the next 24 hours and once we see that she has been in negative balance will be able to take it out most likely tomorrow.  Details were discussed with the patient no family in the room.  I called Val Cornell she is the POA, discussed all the details about her health care and likely needing dialysis.  Details were also discussed with the hospitalist service.   Continue with rest of the current treatment plan, and monitor with surveillance labs.  Further recommendations will depend on clinical course of the patient during the current hospitalization.     Thank you for involving us in the care of Millicent Mckeon.  Please feel free to call with any questions.    Milad Leone MD  03/06/22  09:56 [facility]    [facility]  [phone]      Much of this encounter note is an electronic transcription/translation of spoken language to printed text. The electronic translation of spoken language may permit erroneous, or at times, nonsensical words or phrases to be inadvertently transcribed; Although I have reviewed the note for such errors, some may still exist.

## 2022-03-06 NOTE — PROGRESS NOTES
[facility]    Name:  Millicent Mckeon   Age:  61 y.o.  Sex:  female  :  1960  MRN:  [id number]   Visit Number:  [id number]  Admission Date:  3/4/2022  Date Of Service:  22  Primary Care Physician:  Marianela Albright APRN     LOS: 2 days :    Chief Complaint:      Follow-up of encephalopathy, hypoglycemia and hypothermia.    Subjective:    Ms. Mckeon was seen and examined this morning.  She is currently lying down on the bed and is comfortable at rest.  She is denies any chest pain or shortness of breath.  She is agreeable to discontinue her Wilkinson catheter today.  She states that her lower extremity redness is improving.  She is already telling me that she does not want to work with physical therapy today.  Physical therapy is supposed to see her.  She was transferred out of ICU yesterday.  Her blood sugars are slowly increasing and in the 300s.  No further episodes of hypoglycemia.    Hospital Course:    Ms. Mckeon is a chronically ill lady with history of atrial fibrillation, chronic systolic heart failure, chronic kidney disease stage IV, chronic cor pulmonale, morbid obesity, functional quadriplegia who is wheelchair-bound was brought to the emergency room by EMS after she was found on the front door of her house sitting in a wheelchair in the cold and confused.  When the EMS checked her fingerstick glucose and apparently it was in the 50s.  She was also noted to have significant ulcerations and redness in both her lower extremities.     In the emergency room, she was noted to have significant hypothermia with initial temperature of 88.9 and was placed on warming blanket.  Pulse was 80 with a blood pressure of 134/88.  Pulse oxygen saturation was 98% on room air.  Patient was given Narcan without any significant change in her mental status.  Her initial fingerstick was 62 and it dropped to 52 on her blood work and she was started on dextrose 10% infusion.   Blood work was remarkable for a creatinine of 3.19 (her baseline), BUN 56, BNP 18,778, procalcitonin 0.21.  Lactic acid was 1.1.  ABG done in the emergency room showed a pH of 7.37, PCO2 49, PO2 78 and bicarb of 28.  CT of the head was unremarkable.  CT of the chest was negative for any infiltrates.  Urinalysis was within normal limits.      Patient was admitted to the medical ICU for hypothermia, metabolic encephalopathy, hypoglycemia as well as bilateral lower extremity cellulitis.  She was placed on dextrose infusion and IV antibiotics therapy with Rocephin and doxycycline.  She was placed on warming blanket.  Patient improved significantly over the next 12 hours with regards to her hypothermia as well as her mental status.  She was seen by Dr. Cutler from nephrology and was started on IV diuretic therapy for her significant anasarca.  She was transferred out to the medical floor with telemetry the next day.    Review of Systems:     All systems were reviewed and negative except as mentioned in subjective, assessment and plan.    Vital Signs:    Temp:  [97.52 °F (36.4 °C)-98 °F (36.7 °C)] 98 °F (36.7 °C)  Heart Rate:  [104-106] 106  Resp:  [12-18] 18  BP: (118-159)/() 136/91    Intake and output:    I/O last 3 completed shifts:  In: 3273 [P.O.:1440; I.V.:1633; IV Piggyback:200]  Out: 1700 [Urine:1700]  I/O this shift:  In: 240 [P.O.:240]  Out: -     Physical Examination:    General Appearance:  Alert and cooperative. Obese and comfortable at rest.   Head:  Atraumatic and normocephalic.   Eyes: Conjunctivae and sclerae normal, no icterus. No pallor.   Throat: No oral lesions, no thrush, oral mucosa moist.   Neck: Supple, trachea midline, no thyromegaly.   Lungs:   Breath sounds heard bilaterally equally.  No wheezing or crackles. No Pleural rub or bronchial breathing.   Heart:  Normal S1 and S2, no murmur, no gallop, no rub. No JVD.   Abdomen:   Normal bowel sounds, no masses, no organomegaly. Soft,  nontender, obese with large pannus, no rebound tenderness.  Wilkinson catheter is in place.   Extremities: Supple, no cyanosis, no clubbing.  3-4+ pitting edema noted in both lower extremities up to mid thighs.   Skin: No bleeding.  Multiple excoriations and small ulcers with yellow slough noted in lower extremities associated with erythema up to her knees.  Multiple excoriations noted on upper extremities in various stages of healing.   Neurologic: Alert and oriented x 3. No facial asymmetry. Moves all four limbs but does have generalized weakness.  No asterixis.      Laboratory results:    Results from last 7 days   Lab Units 03/06/22  0552 03/05/22  0435 03/04/22  1108   SODIUM mmol/L 137 139 138   POTASSIUM mmol/L 4.6 4.3 3.8   CHLORIDE mmol/L 99 100 99   CO2 mmol/L 25.0 23.0 23.7   BUN mg/dL 56* 51* 56*   CREATININE mg/dL 3.33* 3.18* 3.19*   CALCIUM mg/dL 7.9* 7.9* 8.2*   BILIRUBIN mg/dL  --   --  0.6   ALK PHOS U/L  --   --  218*   ALT (SGPT) U/L  --   --  13   AST (SGOT) U/L  --   --  24   GLUCOSE mg/dL 301* 201* 52*     Results from last 7 days   Lab Units 03/05/22  0435 03/04/22  1108   WBC 10*3/mm3 6.88 6.29   HEMOGLOBIN g/dL 10.0* 11.2*   HEMATOCRIT % 33.5* 37.5   PLATELETS 10*3/mm3 127* 124*         Results from last 7 days   Lab Units 03/05/22  0435   TROPONIN T ng/mL 0.020     Results from last 7 days   Lab Units 03/04/22  2247 03/04/22  2239   BLOODCX  No growth at 24 hours No growth at 24 hours     Results from last 7 days   Lab Units 03/04/22  1140   PH, ARTERIAL pH units 7.373   PO2 ART mm Hg 78.3   PCO2, ARTERIAL mm Hg 48.7*   HCO3 ART mmol/L 28.3*     I have reviewed the patient's laboratory results.    Radiology results:    CT Head Without Contrast    Result Date: 3/4/2022  PROCEDURE: CT HEAD WO CONTRAST-  HISTORY: AMS, altered mental status  COMPARISON: 1/13/2019  TECHNIQUE: Noncontrast exam  FINDINGS: Brain parenchyma is homogeneous without evidence of hemorrhage, mass effect or edema. No  extra-axial abnormality is noted. Ventricles and cisterns appear normal.  The visualized sinuses, orbits and petrous temporal bones appear unremarkable.      Impression: Unremarkable unenhanced CT of the brain.   This study was performed with techniques to keep radiation doses as low as reasonably achievable (ALARA). Individualized dose reduction techniques using automated exposure control or adjustment of vA and/or kV according to the patient size were employed.  This report was signed and finalized on 3/4/2022 12:38 PM by Fito Andino MD.    XR Chest 1 View    Result Date: 3/4/2022  PROCEDURE: XR CHEST 1 VW-  HISTORY: AMS , hypertension  COMPARISON:  1/31/2022  FINDINGS:  Portable view of the chest demonstrates the lungs to be grossly clear. There is no evidence of effusion, pneumothorax or other significant pleural disease. The mediastinum is unremarkable.  The heart size is moderately enlarged.      Impression: No acute findings  This report was signed and finalized on 3/4/2022 11:38 AM by Fito Andino MD.    I have reviewed the patient's radiology reports.    Medication Review:     I have reviewed the patient's active and prn medications.     Problem List:      Acute metabolic encephalopathy    Cellulitis of lower extremity    Hypothermia    Hypoglycemia    Essential hypertension    Acquired hypothyroidism    Type 2 diabetes mellitus with nephropathy (HCC)    Cor pulmonale, chronic (HCC)    Chronic venous hypertension involving both sides    Obesity, morbid, BMI 50 or higher (HCC)    A-fib (HCC)    Chronic kidney disease, stage IV (severe) (HCC)    Assessment:    1. Acute metabolic encephalopathy secondary to #2 and 3, POA, resolved.  2. Severe hypothermia, likely environmental, POA, resolved.  3. Acute hypoglycemia, POA, resolved.  4. Bilateral lower extremity cellulitis.  5. Chronic kidney disease stage IV.  6. Chronic systolic heart failure with ejection fraction of 35%.  7. Paroxysmal atrial fibrillation  on apixaban.  8. Chronic cor pulmonale.  9. Chronic hypoxic respiratory failure on home oxygen.  10. Diabetes mellitus type 2 with nephropathy.  11. Chronic venous insufficiency of the lower extremities.  12. Morbid obesity with a BMI of 56.  13. Anemia of chronic kidney disease.  14. Functional quadriplegia.  15. Acquired hypothyroidism.    Plan:    Metabolic encephalopathy/hypoglycemia/hypothermia.  -Significantly improved and her mental status is back to baseline.  -Currently has hyperglycemia and I will start her on Levemir 20 units daily.  -Continue subcutaneous insulin protocol for coverage.  -Hemoglobin A1c 8.5 on 3/4/2022.     Bilateral lower extremity cellulitis.  -She does have significant excoriations and cellulitis of the lower extremity.  -Continue Rocephin and doxycycline (day 3).  -Blood and wound cultures are currently pending.  -Continue lactobacillus supplements.  -We will get wound care consult tomorrow.     Congestive heart failure/volume overload/CKD 4.  -We will continue another day of IV Lasix therapy.  -I have discussed the patient's condition and treatment plan with Dr. Cutler.  -Continue home medications including amiodarone, Toprol-XL, Imdur.  -She was started on midodrine during this hospital stay and I will decrease its dose and eventually discontinue it.  -2D echocardiogram was done on 3/4/2022 and the report is currently pending.    Continue physical and occupational therapy.    DVT Prophylaxis: Apixaban  Code Status: DNR/DNI  Diet: Diabetic renal  Discharge Plan: Pending-hopefully home with home health in the next 2 to 3 days.    Samson Reyes MD  03/06/22  09:44 EST    Dictated utilizing Dragon dictation.

## 2022-03-06 NOTE — PLAN OF CARE
Goal Outcome Evaluation:  Plan of Care Reviewed With: patient        Progress: improving  Outcome Evaluation: Vital signs stable. Patient's blood glucose continues to be elevated. Patient maintained O2 saturations in the mid 90's on room air. Patient is tachycardic. Patient refused to work with PT today. No acute changes noted during the shift. Will continue to monitor.

## 2022-03-06 NOTE — PLAN OF CARE
Goal Outcome Evaluation:  Plan of Care Reviewed With: patient        Progress: no change  Outcome Evaluation: No acute events overnight. Patient has rested well. Continue to monitor.

## 2022-03-07 VITALS
WEIGHT: 293 LBS | HEART RATE: 106 BPM | SYSTOLIC BLOOD PRESSURE: 128 MMHG | TEMPERATURE: 96.1 F | OXYGEN SATURATION: 97 % | RESPIRATION RATE: 16 BRPM | HEIGHT: 66 IN | DIASTOLIC BLOOD PRESSURE: 86 MMHG | BODY MASS INDEX: 47.09 KG/M2

## 2022-03-07 PROBLEM — T68.XXXA HYPOTHERMIA: Status: RESOLVED | Noted: 2019-01-10 | Resolved: 2022-03-07

## 2022-03-07 PROBLEM — E16.2 HYPOGLYCEMIA: Status: RESOLVED | Noted: 2022-03-04 | Resolved: 2022-03-07

## 2022-03-07 PROBLEM — G93.41 ACUTE METABOLIC ENCEPHALOPATHY: Status: RESOLVED | Noted: 2019-01-14 | Resolved: 2022-03-07

## 2022-03-07 LAB
ALBUMIN SERPL-MCNC: 3 G/DL (ref 3.5–5.2)
ANION GAP SERPL CALCULATED.3IONS-SCNC: 14.5 MMOL/L (ref 5–15)
BH CV ECHO MEAS - AO MAX PG (FULL): 2.3 MMHG
BH CV ECHO MEAS - AO MAX PG: 5 MMHG
BH CV ECHO MEAS - AO MEAN PG (FULL): 1 MMHG
BH CV ECHO MEAS - AO MEAN PG: 3 MMHG
BH CV ECHO MEAS - AO ROOT AREA (BSA CORRECTED): 0.74
BH CV ECHO MEAS - AO ROOT AREA: 2.8 CM^2
BH CV ECHO MEAS - AO ROOT DIAM: 1.9 CM
BH CV ECHO MEAS - AO V2 MAX: 108 CM/SEC
BH CV ECHO MEAS - AO V2 MEAN: 74.3 CM/SEC
BH CV ECHO MEAS - AO V2 VTI: 27.1 CM
BH CV ECHO MEAS - AVA(I,A): 1.5 CM^2
BH CV ECHO MEAS - AVA(I,D): 1.5 CM^2
BH CV ECHO MEAS - AVA(V,A): 1.7 CM^2
BH CV ECHO MEAS - AVA(V,D): 1.7 CM^2
BH CV ECHO MEAS - BSA(HAYCOCK): 2.8 M^2
BH CV ECHO MEAS - BSA: 2.5 M^2
BH CV ECHO MEAS - BZI_BMI: 56.8 KILOGRAMS/M^2
BH CV ECHO MEAS - BZI_METRIC_HEIGHT: 167.6 CM
BH CV ECHO MEAS - BZI_METRIC_WEIGHT: 159.7 KG
BH CV ECHO MEAS - EDV(CUBED): 100.5 ML
BH CV ECHO MEAS - EDV(MOD-SP2): 86.8 ML
BH CV ECHO MEAS - EDV(MOD-SP4): 91.5 ML
BH CV ECHO MEAS - EDV(TEICH): 99.8 ML
BH CV ECHO MEAS - EF(CUBED): 76.7 %
BH CV ECHO MEAS - EF(MOD-BP): 53.1 %
BH CV ECHO MEAS - EF(MOD-SP2): 41.9 %
BH CV ECHO MEAS - EF(MOD-SP4): 59 %
BH CV ECHO MEAS - EF(TEICH): 68.8 %
BH CV ECHO MEAS - ESV(CUBED): 23.4 ML
BH CV ECHO MEAS - ESV(MOD-SP2): 50.4 ML
BH CV ECHO MEAS - ESV(MOD-SP4): 37.5 ML
BH CV ECHO MEAS - ESV(TEICH): 31.1 ML
BH CV ECHO MEAS - FS: 38.5 %
BH CV ECHO MEAS - IVS/LVPW: 0.84
BH CV ECHO MEAS - IVSD: 1.1 CM
BH CV ECHO MEAS - LA DIMENSION: 4.5 CM
BH CV ECHO MEAS - LA/AO: 2.4
BH CV ECHO MEAS - LAD MAJOR: 6.2 CM
BH CV ECHO MEAS - LAT PEAK E' VEL: 7.2 CM/SEC
BH CV ECHO MEAS - LATERAL E/E' RATIO: 12.9
BH CV ECHO MEAS - LV DIASTOLIC VOL/BSA (35-75): 36 ML/M^2
BH CV ECHO MEAS - LV MASS(C)D: 218.5 GRAMS
BH CV ECHO MEAS - LV MASS(C)DI: 85.9 GRAMS/M^2
BH CV ECHO MEAS - LV MAX PG: 2.7 MMHG
BH CV ECHO MEAS - LV MEAN PG: 2 MMHG
BH CV ECHO MEAS - LV SYSTOLIC VOL/BSA (12-30): 14.7 ML/M^2
BH CV ECHO MEAS - LV V1 MAX: 82.8 CM/SEC
BH CV ECHO MEAS - LV V1 MEAN: 58.9 CM/SEC
BH CV ECHO MEAS - LV V1 VTI: 18.8 CM
BH CV ECHO MEAS - LVIDD: 4.7 CM
BH CV ECHO MEAS - LVIDS: 2.9 CM
BH CV ECHO MEAS - LVLD AP2: 7 CM
BH CV ECHO MEAS - LVLD AP4: 8 CM
BH CV ECHO MEAS - LVLS AP2: 6.9 CM
BH CV ECHO MEAS - LVLS AP4: 7.3 CM
BH CV ECHO MEAS - LVOT AREA (M): 2.2 CM^2
BH CV ECHO MEAS - LVOT AREA: 2.2 CM^2
BH CV ECHO MEAS - LVOT DIAM: 1.7 CM
BH CV ECHO MEAS - LVPWD: 1.1 CM
BH CV ECHO MEAS - MED PEAK E' VEL: 5.2 CM/SEC
BH CV ECHO MEAS - MEDIAL E/E' RATIO: 17.7
BH CV ECHO MEAS - MV A MAX VEL: 43.2 CM/SEC
BH CV ECHO MEAS - MV DEC SLOPE: 549 CM/SEC^2
BH CV ECHO MEAS - MV DEC TIME: 0.13 SEC
BH CV ECHO MEAS - MV E MAX VEL: 92.6 CM/SEC
BH CV ECHO MEAS - MV E/A: 2.1
BH CV ECHO MEAS - MV MAX PG: 3.9 MMHG
BH CV ECHO MEAS - MV MEAN PG: 2 MMHG
BH CV ECHO MEAS - MV P1/2T MAX VEL: 113 CM/SEC
BH CV ECHO MEAS - MV P1/2T: 60.3 MSEC
BH CV ECHO MEAS - MV V2 MAX: 99 CM/SEC
BH CV ECHO MEAS - MV V2 MEAN: 65.2 CM/SEC
BH CV ECHO MEAS - MV V2 VTI: 21.3 CM
BH CV ECHO MEAS - MVA P1/2T LCG: 1.9 CM^2
BH CV ECHO MEAS - MVA(P1/2T): 3.6 CM^2
BH CV ECHO MEAS - MVA(VTI): 2 CM^2
BH CV ECHO MEAS - RAP SYSTOLE: 15 MMHG
BH CV ECHO MEAS - RVSP: 63 MMHG
BH CV ECHO MEAS - SI(AO): 29.9 ML/M^2
BH CV ECHO MEAS - SI(CUBED): 30.3 ML/M^2
BH CV ECHO MEAS - SI(LVOT): 16.4 ML/M^2
BH CV ECHO MEAS - SI(MOD-SP2): 14.3 ML/M^2
BH CV ECHO MEAS - SI(MOD-SP4): 21.2 ML/M^2
BH CV ECHO MEAS - SI(TEICH): 27 ML/M^2
BH CV ECHO MEAS - SV(AO): 76 ML
BH CV ECHO MEAS - SV(CUBED): 77.2 ML
BH CV ECHO MEAS - SV(LVOT): 41.7 ML
BH CV ECHO MEAS - SV(MOD-SP2): 36.4 ML
BH CV ECHO MEAS - SV(MOD-SP4): 54 ML
BH CV ECHO MEAS - SV(TEICH): 68.7 ML
BH CV ECHO MEAS - TAPSE (>1.6): 0.92 CM
BH CV ECHO MEAS - TR MAX PG: 47.9 MMHG
BH CV ECHO MEAS - TR MAX VEL: 346 CM/SEC
BH CV ECHO MEASUREMENTS AVERAGE E/E' RATIO: 14.94
BH CV XLRA - RV BASE: 4.9 CM
BH CV XLRA - RV LENGTH: 6.2 CM
BH CV XLRA - RV MID: 3.5 CM
BH CV XLRA - TDI S': 6.4 CM/SEC
BUN SERPL-MCNC: 60 MG/DL (ref 8–23)
BUN/CREAT SERPL: 16.9 (ref 7–25)
CALCIUM SPEC-SCNC: 7.9 MG/DL (ref 8.6–10.5)
CHLORIDE SERPL-SCNC: 97 MMOL/L (ref 98–107)
CO2 SERPL-SCNC: 25.5 MMOL/L (ref 22–29)
CREAT SERPL-MCNC: 3.55 MG/DL (ref 0.57–1)
DEPRECATED RDW RBC AUTO: 60.5 FL (ref 37–54)
EGFRCR SERPLBLD CKD-EPI 2021: 13.9 ML/MIN/1.73
ERYTHROCYTE [DISTWIDTH] IN BLOOD BY AUTOMATED COUNT: 20.7 % (ref 12.3–15.4)
GLUCOSE BLDC GLUCOMTR-MCNC: 196 MG/DL (ref 70–130)
GLUCOSE BLDC GLUCOMTR-MCNC: 222 MG/DL (ref 70–130)
GLUCOSE SERPL-MCNC: 207 MG/DL (ref 65–99)
HCT VFR BLD AUTO: 35.4 % (ref 34–46.6)
HGB BLD-MCNC: 10.4 G/DL (ref 12–15.9)
LEFT ATRIUM VOLUME INDEX: 31.7 ML/M^2
LEFT ATRIUM VOLUME: 80.6 ML
LV EF 2D ECHO EST: 48 %
MAXIMAL PREDICTED HEART RATE: 157 BPM
MCH RBC QN AUTO: 23.7 PG (ref 26.6–33)
MCHC RBC AUTO-ENTMCNC: 29.4 G/DL (ref 31.5–35.7)
MCV RBC AUTO: 80.8 FL (ref 79–97)
PHOSPHATE SERPL-MCNC: 3.6 MG/DL (ref 2.5–4.5)
PLATELET # BLD AUTO: 136 10*3/MM3 (ref 140–450)
PMV BLD AUTO: 10.1 FL (ref 6–12)
POTASSIUM SERPL-SCNC: 4.4 MMOL/L (ref 3.5–5.2)
RBC # BLD AUTO: 4.38 10*6/MM3 (ref 3.77–5.28)
SODIUM SERPL-SCNC: 137 MMOL/L (ref 136–145)
STRESS TARGET HR: 133 BPM
WBC NRBC COR # BLD: 6.36 10*3/MM3 (ref 3.4–10.8)

## 2022-03-07 PROCEDURE — 63710000001 INSULIN ASPART PER 5 UNITS: Performed by: STUDENT IN AN ORGANIZED HEALTH CARE EDUCATION/TRAINING PROGRAM

## 2022-03-07 PROCEDURE — 63710000001 INSULIN DETEMIR PER 5 UNITS: Performed by: INTERNAL MEDICINE

## 2022-03-07 PROCEDURE — 82962 GLUCOSE BLOOD TEST: CPT

## 2022-03-07 PROCEDURE — 99239 HOSP IP/OBS DSCHRG MGMT >30: CPT | Performed by: INTERNAL MEDICINE

## 2022-03-07 PROCEDURE — 80069 RENAL FUNCTION PANEL: CPT | Performed by: INTERNAL MEDICINE

## 2022-03-07 PROCEDURE — 85027 COMPLETE CBC AUTOMATED: CPT | Performed by: INTERNAL MEDICINE

## 2022-03-07 RX ORDER — AMOXICILLIN AND CLAVULANATE POTASSIUM 500; 125 MG/1; MG/1
1 TABLET, FILM COATED ORAL 2 TIMES DAILY
Qty: 10 TABLET | Refills: 0 | Status: SHIPPED | OUTPATIENT
Start: 2022-03-07 | End: 2022-03-12

## 2022-03-07 RX ORDER — BUMETANIDE 1 MG/1
1 TABLET ORAL 2 TIMES DAILY
Qty: 60 TABLET | Refills: 0 | Status: SHIPPED | OUTPATIENT
Start: 2022-03-07 | End: 2022-04-14 | Stop reason: HOSPADM

## 2022-03-07 RX ORDER — BUMETANIDE 1 MG/1
1 TABLET ORAL ONCE
Status: COMPLETED | OUTPATIENT
Start: 2022-03-07 | End: 2022-03-07

## 2022-03-07 RX ORDER — DOXYCYCLINE HYCLATE 100 MG/1
100 CAPSULE ORAL 2 TIMES DAILY
Qty: 10 CAPSULE | Refills: 0 | Status: SHIPPED | OUTPATIENT
Start: 2022-03-07 | End: 2022-03-12

## 2022-03-07 RX ORDER — BUMETANIDE 1 MG/1
2 TABLET ORAL 2 TIMES DAILY
Status: CANCELLED | OUTPATIENT
Start: 2022-03-08

## 2022-03-07 RX ADMIN — INSULIN ASPART 4 UNITS: 100 INJECTION, SOLUTION INTRAVENOUS; SUBCUTANEOUS at 06:32

## 2022-03-07 RX ADMIN — DOCUSATE SODIUM 100 MG: 100 CAPSULE, LIQUID FILLED ORAL at 09:16

## 2022-03-07 RX ADMIN — PANTOPRAZOLE SODIUM 40 MG: 40 TABLET, DELAYED RELEASE ORAL at 05:59

## 2022-03-07 RX ADMIN — METOPROLOL SUCCINATE 100 MG: 100 TABLET, EXTENDED RELEASE ORAL at 09:15

## 2022-03-07 RX ADMIN — Medication 3 ML: at 10:50

## 2022-03-07 RX ADMIN — ASPIRIN 81 MG: 81 TABLET, CHEWABLE ORAL at 09:16

## 2022-03-07 RX ADMIN — APIXABAN 5 MG: 5 TABLET, FILM COATED ORAL at 09:17

## 2022-03-07 RX ADMIN — MIDODRINE HYDROCHLORIDE 2.5 MG: 5 TABLET ORAL at 12:23

## 2022-03-07 RX ADMIN — SILVER SULFADIAZINE 1 APPLICATION: 10 CREAM TOPICAL at 10:51

## 2022-03-07 RX ADMIN — INSULIN ASPART 2 UNITS: 100 INJECTION, SOLUTION INTRAVENOUS; SUBCUTANEOUS at 12:24

## 2022-03-07 RX ADMIN — LEVOTHYROXINE SODIUM 150 MCG: 150 TABLET ORAL at 06:00

## 2022-03-07 RX ADMIN — FERROUS SULFATE TAB EC 324 MG (65 MG FE EQUIVALENT) 324 MG: 324 (65 FE) TABLET DELAYED RESPONSE at 09:17

## 2022-03-07 RX ADMIN — MIDODRINE HYDROCHLORIDE 2.5 MG: 5 TABLET ORAL at 06:32

## 2022-03-07 RX ADMIN — Medication 1 CAPSULE: at 09:13

## 2022-03-07 RX ADMIN — METOLAZONE 10 MG: 5 TABLET ORAL at 09:13

## 2022-03-07 RX ADMIN — INSULIN DETEMIR 20 UNITS: 100 INJECTION, SOLUTION SUBCUTANEOUS at 09:00

## 2022-03-07 RX ADMIN — AMIODARONE HYDROCHLORIDE 100 MG: 200 TABLET ORAL at 11:20

## 2022-03-07 RX ADMIN — ISOSORBIDE MONONITRATE 10 MG: 20 TABLET ORAL at 10:35

## 2022-03-07 RX ADMIN — BUMETANIDE 1 MG: 1 TABLET ORAL at 14:38

## 2022-03-07 NOTE — PROGRESS NOTES
[facility] Progress Note  [facility]. [place]        Patient Name: Millicent Mckeon  : 1960  MRN: [id number]   LOS: 3 days    Patient Care Team:  Marianela Albright APRN as PCP - General (Family Medicine)  Geremias Shepherd MD as Consulting Physician (General Surgery)  Lisa Cardoso MD as Surgeon (General Surgery)    Chief Complaint:    Chief Complaint   Patient presents with   • Altered Mental Status     Primary Care Physician:  Marianela Albright APRN  Date of admission: 3/4/2022    Subjective     Interval History:   Events noted from last 24 hours.  She is at about her baseline, she still was fairly adamant about not doing dialysis.  Her POA was not able to make it to the hospital yesterday. She denies any fever chills or shortness of breath.  She said she feels good enough and wants to go home.    Review of Systems:   As noted above    Objective     Vitals:   Temp:  [96.1 °F (35.6 °C)-99.1 °F (37.3 °C)] 96.1 °F (35.6 °C)  Heart Rate:  [104-106] 104  Resp:  [18] 18  BP: (118-134)/(77-88) 134/88    Intake/Output Summary (Last 24 hours) at 3/7/2022 0821  Last data filed at 3/7/2022 0545  Gross per 24 hour   Intake 642 ml   Output 2700 ml   Net -2058 ml       Physical Exam:    General Appearance: alert, oriented x 3, no acute distress   Skin: warm and dry  HEENT: oral mucosa normal, nonicteric sclera  Neck: supple, no JVD  Lungs: CTA, does have bibasilar crackles no rhonchi.  Heart: RRR, normal S1 and S2  Abdomen: soft, nontender, nondistended  : no palpable bladder  Extremities: 2+ edema, cyanosis or clubbing  Neuro: normal speech and mental status     Scheduled Meds:     Current Facility-Administered Medications   Medication Dose Route Frequency Provider Last Rate Last Admin   • acetaminophen (TYLENOL) tablet 650 mg  650 mg Oral Q4H PRN Samson Reyes MD   650 mg at 22 1741    Or   • acetaminophen (TYLENOL) 160 MG/5ML solution 650 mg  650 mg Oral Q4H PRN Amy  MD Samson        Or   • acetaminophen (TYLENOL) suppository 650 mg  650 mg Rectal Q4H PRN Samson Reyes MD       • amiodarone (PACERONE) tablet 100 mg  100 mg Oral Q48H Samson Reyes MD   100 mg at 03/05/22 0913   • apixaban (ELIQUIS) tablet 5 mg  5 mg Oral Q12H Samson Reyes MD   5 mg at 03/06/22 2059   • aspirin chewable tablet 81 mg  81 mg Oral Daily Samson Reyes MD   81 mg at 03/06/22 0833   • atorvastatin (LIPITOR) tablet 10 mg  10 mg Oral Nightly Samson Reyes MD   10 mg at 03/06/22 2059   • cefTRIAXone (ROCEPHIN) IVPB 1 g/50ml dextrose (premix)  1 g Intravenous Q24H Samson Reyes  mL/hr at 03/06/22 1530 1 g at 03/06/22 1530   • dextrose (D50W) (25 g/50 mL) IV injection 25 g  25 g Intravenous Q15 Min PRN Samson Reyes MD       • dextrose (D50W) (25 g/50 mL) IV injection 50 mL  50 mL Intravenous Q1H PRN Samson Reyes MD   50 mL at 03/04/22 1100   • dextrose (GLUTOSE) oral gel 1 tube  1 tube Oral Q15 Min PRN Samson Reyes MD       • docusate sodium (COLACE) capsule 100 mg  100 mg Oral BID Samson Reyes MD   100 mg at 03/06/22 2059   • doxycycline (VIBRAMYCIN) 100 mg/100 mL 0.9% NS MBP  100 mg Intravenous Q12H Samson Reyes MD   100 mg at 03/07/22 0545   • ferrous sulfate EC tablet 324 mg  324 mg Oral BID With Meals Samson Reyes MD   324 mg at 03/06/22 1723   • glucagon (human recombinant) (GLUCAGEN DIAGNOSTIC) injection 1 mg  1 mg Subcutaneous PRN Samson Reyes MD       • insulin aspart (novoLOG) injection 0-9 Units  0-9 Units Subcutaneous TID Giana Padgett DO   4 Units at 03/07/22 0632   • insulin detemir (LEVEMIR) injection 20 Units  20 Units Subcutaneous Daily Samson Reyes MD   20 Units at 03/06/22 0830   • isosorbide mononitrate (ISMO,MONOKET) tablet 10 mg  10 mg Oral Daily Samson Reyes MD   10 mg at 03/06/22 1046   • lactobacillus acidophilus (RISAQUAD) capsule 1 capsule  1 capsule Oral BID Samson Reyes MD   1 capsule at 03/06/22 2059   • levothyroxine (SYNTHROID, LEVOTHROID) tablet 150  mcg  150 mcg Oral Daily Samson Reyes MD   150 mcg at 03/07/22 0600   • metOLazone (ZAROXOLYN) tablet 10 mg  10 mg Oral Daily Milad Leone MD   10 mg at 03/06/22 1046   • metoprolol succinate XL (TOPROL-XL) 24 hr tablet 100 mg  100 mg Oral Q24H Samson Reyes MD   100 mg at 03/06/22 1046   • midodrine (PROAMATINE) tablet 2.5 mg  2.5 mg Oral TID AC Samson Reyes MD   2.5 mg at 03/07/22 0632   • ondansetron (ZOFRAN) injection 4 mg  4 mg Intravenous Q6H PRN Samson Reyes MD       • pantoprazole (PROTONIX) EC tablet 40 mg  40 mg Oral Daily Samson Reyes MD   40 mg at 03/07/22 0559   • silver sulfadiazine (SILVADENE, SSD) 1 % cream 1 application  1 application Topical Q24H Samson Reyes MD   1 application at 03/06/22 0840   • sodium chloride 0.9 % flush 10 mL  10 mL Intravenous PRN Samson Reyes MD       • sodium chloride 0.9 % flush 3 mL  3 mL Intravenous Q12H Samson Reyes MD   3 mL at 03/06/22 2100   • sodium chloride 0.9 % flush 3-10 mL  3-10 mL Intravenous PRN Samson Reyes MD           amiodarone, 100 mg, Oral, Q48H  apixaban, 5 mg, Oral, Q12H  aspirin, 81 mg, Oral, Daily  atorvastatin, 10 mg, Oral, Nightly  cefTRIAXone, 1 g, Intravenous, Q24H  docusate sodium, 100 mg, Oral, BID  doxycycline, 100 mg, Intravenous, Q12H  ferrous sulfate, 324 mg, Oral, BID With Meals  insulin aspart, 0-9 Units, Subcutaneous, TID AC  insulin detemir, 20 Units, Subcutaneous, Daily  isosorbide mononitrate, 10 mg, Oral, Daily  lactobacillus acidophilus, 1 capsule, Oral, BID  levothyroxine, 150 mcg, Oral, Daily  metOLazone, 10 mg, Oral, Daily  metoprolol succinate XL, 100 mg, Oral, Q24H  midodrine, 2.5 mg, Oral, TID AC  pantoprazole, 40 mg, Oral, Daily  silver sulfadiazine, 1 application, Topical, Q24H  sodium chloride, 3 mL, Intravenous, Q12H        IV Meds:        Results Reviewed:   I have personally reviewed the results from the time of this admission to 3/7/2022 08:21 EST     Results from last 7 days   Lab Units 03/07/22  0537  03/06/22  0552 03/05/22  0435 03/04/22  1108   SODIUM mmol/L 137 137 139 138   POTASSIUM mmol/L 4.4 4.6 4.3 3.8   CHLORIDE mmol/L 97* 99 100 99   CO2 mmol/L 25.5 25.0 23.0 23.7   BUN mg/dL 60* 56* 51* 56*   CREATININE mg/dL 3.55* 3.33* 3.18* 3.19*   CALCIUM mg/dL 7.9* 7.9* 7.9* 8.2*   BILIRUBIN mg/dL  --   --   --  0.6   ALK PHOS U/L  --   --   --  218*   ALT (SGPT) U/L  --   --   --  13   AST (SGOT) U/L  --   --   --  24   GLUCOSE mg/dL 207* 301* 201* 52*       Estimated Creatinine Clearance: 25.5 mL/min (A) (by C-G formula based on SCr of 3.55 mg/dL (H)).    Results from last 7 days   Lab Units 03/07/22  0537 03/05/22  0435   PHOSPHORUS mg/dL 3.6 4.2             Results from last 7 days   Lab Units 03/07/22  0537 03/05/22  0435 03/04/22  1108   WBC 10*3/mm3 6.36 6.88 6.29   HEMOGLOBIN g/dL 10.4* 10.0* 11.2*   PLATELETS 10*3/mm3 136* 127* 124*             Brief Urine Lab Results  (Last result in the past 365 days)      Color   Clarity   Blood   Leuk Est   Nitrite   Protein   CREAT   Urine HCG        03/05/22 1113 Orange   Clear   Large (3+)   Small (1+)   Negative   >=300 mg/dL (3+)                 Protein/Creatinine Ratio, Urine   Date Value Ref Range Status   03/05/2022 2,909.4 (H) 0.0 - 200.0 mg/G Crea Final       Imaging Results (Last 24 Hours)     ** No results found for the last 24 hours. **              Assessment / Plan     ASSESSMENT:    Chronic kidney disease, stage IV (severe) ([facility]): Diabetic hypertensive nephropathy may end up progressing to end-stage renal disease, patient does not want dialysis.     Obesity, morbid, BMI 50 or higher (HCC): Complicates all forms of care, wheelchair dependent.    A-fib (HCC): Rate controlled and anticoagulation as per cardiology and hospitalist service.    Hypoglycemia: Continue with D10 and will hold off giving any oral hypoglycemic agents.    Acute metabolic encephalopathy: Improved after serum glucose better.    Cellulitis of lower extremity: Currently on IV  antibiotics that can be continued and rest of the treatment as per hospitalist service.    Essential hypertension: Decrease the blood pressure medications and increase diuresis as tolerated.    Hypothermia: Clinically much better.    Acquired hypothyroidism: Continue home dose of Synthroid.    Type 2 diabetes mellitus with nephropathy (HCC): Will not restart long-acting medications, she will need short-acting medications and may end up requiring insulin as well.    Cor pulmonale, chronic (HCC): Longstanding history of sleep apnea with noncompliance with BiPAP.    Chronic venous hypertension involving both sides: Continue to optimize medications.      PLAN:  She has responded well to the diuretics.  Clinically appears to be improving.  I will switch her to the oral Bumex 2 mg twice a day and she may be able to go home with this dose with continuing rest of the medicines.  Details were also discussed with the hospitalist service.  She will need 1 week follow-up with me in the office.  We will continue to manage chronic kidney disease until she developed symptoms and at that time may have to pursue hospice.  Her POA is well aware of that she is heading in that direction and likely will have further discussions with her in the next few weeks.  Continue with rest of the current treatment plan, and monitor with surveillance labs.  Further recommendations will depend on clinical course of the patient during the current hospitalization.     Thank you for involving us in the care of Millicent Mckeon.  Please feel free to call with any questions.    Milad Leone MD  03/07/22  08:21 [facility]    [facility]  [phone]      Much of this encounter note is an electronic transcription/translation of spoken language to printed text. The electronic translation of spoken language may permit erroneous, or at times, nonsensical words or phrases to be inadvertently transcribed; Although I have reviewed the note  for such errors, some may still exist.

## 2022-03-07 NOTE — EXTERNAL PATIENT INSTRUCTIONS
Patient Education   Table of Contents       Hypoglycemia       Preventing Hypoglycemia     To view videos and all your education online visit,   [email or web address]   or scan this QR code with your smartphone.                  Hypoglycemia     Hypoglycemia occurs when the level of sugar (glucose) in the blood is too low. Hypoglycemia can happen in people who have or do not have diabetes. It can develop quickly, and it can be a medical emergency. For most people, a blood glucose level below 70 mg/dL (3.9 mmol/L) is considered hypoglycemia.   Glucose is a type of sugar that provides the body's main source of energy. Certain hormones (insulin and glucagon) control the level of glucose in the blood. Insulin lowers blood glucose, and glucagon raises blood glucose. Hypoglycemia can result from having too much insulin in the bloodstream, or from not eating enough food that contains glucose. You may also have reactive hypoglycemia, which happens within 4 hours after eating a meal.   What are the causes?    Hypoglycemia occurs most often in people who have diabetes and may be caused by:       Diabetes medicine.       Not eating enough, or not eating often enough.       Increased physical activity.       Drinking alcohol on an empty stomach.      If you do not have diabetes, hypoglycemia may be caused by:       A tumor in the pancreas.       Not eating enough, or not eating for long periods at a time (fasting).       A severe infection or illness.       Problems after having bariatric surgery.       Organ failure, such as kidney or liver failure.       Certain medicines.     What increases the risk?    Hypoglycemia is more likely to develop in people who:       Have diabetes and take medicines to lower blood glucose.       Abuse alcohol.       Have a severe illness.     What are the signs or symptoms?   Symptoms vary depending on whether the condition is mild, moderate, or severe.   Mild hypoglycemia          Hunger.       Anxiety.       Sweating and feeling clammy.       Dizziness or feeling light-headed.       Sleepiness or restless sleep.       Nausea.       Increased heart rate.       Headache.       Blurry vision.       Irritability.       Tingling or numbness around the mouth, lips, or tongue.       A change in coordination.     Moderate hypoglycemia         Confusion and poor judgment.       Behavior changes.       Weakness.       Irregular heartbeat.     Severe hypoglycemia    Severe hypoglycemia is a medical emergency. It can cause:       Fainting.       Seizures.       Loss of consciousness (coma).       Death.     How is this diagnosed?   Hypoglycemia is diagnosed with a blood test to measure your blood glucose level. This blood test is done while you are having symptoms. Your health care provider may also do a physical exam and review your medical history.   How is this treated?    This condition can be treated by immediately eating or drinking something that contains sugar with 15 grams of rapid-acting carbohydrate, such as:       4 oz (120 mL) of fruit juice.       4?6 oz (120?150 mL) of regular soda (not diet soda).       8 oz (240 mL) of low-fat milk.       Several pieces of hard candy. Check food labels to find out how many to eat for 15 grams.       1 Tbsp (15 mL) of sugar or honey.     Treating hypoglycemia if you have diabetes       If you are alert and able to swallow safely, follow the 15:15 rule  :      Take 15 grams of a rapid-acting carbohydrate.  Talk with your health care provider about how much you should take. Options for getting 15 grams of rapid-acting carbohydrate include:       Glucose tablets (take 4 tablets).       Several pieces of hard candy. Check food labels to find out how many pieces to eat for 15 grams.       4 oz (120 mL) of fruit juice.       4?6 oz (120?150 mL) of regular (not diet) soda.       1 Tbsp (15 mL) of honey or sugar.      Check your blood glucose 15 minutes after  you take the carbohydrate.       If the repeat blood glucose level is still at or below 70 mg/dL (3.9 mmol/L), take 15 grams of a carbohydrate again.       If your blood glucose level does not increase above 70 mg/dL (3.9 mmol/L) after 3 tries, seek emergency medical care.       After your blood glucose level returns to normal, eat a meal or a snack within 1 hour.     Treating severe hypoglycemia   Severe hypoglycemia is when your blood glucose level is at or below 54 mg/dL (3 mmol/L). Severe hypoglycemia is a medical emergency. Get medical help right away.   If you have severe hypoglycemia and you cannot eat or drink, you will need to be given glucagon. A family member or close friend should learn how to check your blood glucose and how to give you glucagon. Ask your health care provider if you need to have an emergency glucagon kit available.   Severe hypoglycemia may need to be treated in a hospital. The treatment may include getting glucose through an IV. You may also need treatment for the cause of your hypoglycemia.     Follow these instructions at home:      General instructions         Take over-the-counter and prescription medicines only as told by your health care provider.       Monitor your blood glucose as told by your health care provider.      If you drink alcohol:      Limit how much you use to:       0?1 drink a day for nonpregnant women.       0?2 drinks a day for men.           Be aware of how much alcohol is in your drink. In the [place] one drink equals one 12 oz bottle of beer (355 mL), one 5 oz glass of wine (148 mL), or one 1? oz glass of hard liquor (44 mL).         Keep all follow-up visits as told by your health care provider. This is important.   If you have diabetes:         Always have a rapid-acting carbohydrate (15 grams) option with you to treat low blood glucose.      Follow your diabetes management plan as directed. Make sure you:       Know the symptoms of hypoglycemia. It is  important to treat it right away to prevent it from becoming severe.       Check your blood glucose as often as told. Always check before and after exercise.       Always check your blood glucose before you drive a motorized vehicle.       Take your medicines as told.       Follow your meal plan. Eat on time, and do not  skip meals.       Share your diabetes management plan with people in your workplace, school, and household.       Carry a medical alert card or wear medical alert jewelry.       Contact a health care provider if:         You have problems keeping your blood glucose in your target range.       You have frequent episodes of hypoglycemia.     Get help right away if:         You continue to have hypoglycemia symptoms after eating or drinking something that contains 15 grams of fast-acting carbohydrate and you cannot get your blood glucose above 70 mg/dL (3.9 mmol/L) while following the 15:15 rule.       Your blood glucose is at or below 54 mg/dL (3 mmol/L).       You have a seizure.       You faint.     These symptoms may represent a serious problem that is an emergency. Do not wait to see if the symptoms will go away. Get medical help right away. Call your local emergency services (911 in the [place] Do not drive yourself to the hospital.   Summary         Hypoglycemia occurs when the level of sugar (glucose) in the blood is too low.       Hypoglycemia can happen in people who have or do not have diabetes. It can develop quickly, and it can be a medical emergency.       Make sure you know the symptoms of hypoglycemia and how to treat it.       Always have a rapid-acting carbohydrate snack with you to treat low blood sugar.     This information is not intended to replace advice given to you by your health care provider. Make sure you discuss any questions you have with your health care provider.     Document Released: 12/18/2006Document Revised: 11/11/2020Document Reviewed: 11/11/2020     [employer]  Patient Education ? 2021 [employer]         Preventing Hypoglycemia     Hypoglycemia occurs when the level of sugar (glucose) in the blood is too low. Hypoglycemia can happen in people who do or do not have diabetes (diabetes mellitus). It can develop quickly, and it can be a medical emergency. For most people with diabetes, a blood glucose level below 70 mg/dL (3.9 mmol/L) is considered hypoglycemia.   Glucose is a type of sugar that provides the body's main source of energy. Certain hormones (insulin and glucagon) control the level of glucose in the blood. Insulin lowers blood glucose, and glucagon increases blood glucose. Hypoglycemia can result from having too much insulin in the bloodstream, or from not eating enough food that contains glucose.    Your risk for hypoglycemia is higher:       If you take insulin or diabetes medicines to help lower your blood glucose or help your body make more insulin.       If you skip or delay a meal or snack.       If you are ill.       During and after exercise.     You can prevent hypoglycemia by working with your health care provider to adjust your meal plan as needed and by taking other precautions.   How can hypoglycemia affect me?   Mild symptoms    Mild hypoglycemia may not cause any symptoms. If you do have symptoms, they may include:       Hunger.       Anxiety.       Sweating and feeling clammy.       Dizziness or feeling light-headed.       Sleepiness.       Nausea.       Increased heart rate.       Headache.       Blurry vision.       Irritability.       Tingling or numbness around the mouth, lips, or tongue.       A change in coordination.       Restless sleep.     If mild hypoglycemia is not recognized and treated, it can quickly become moderate or severe hypoglycemia.   Moderate symptoms    Moderate hypoglycemia can cause:       Mental confusion and poor judgment.       Behavior changes.       Weakness.       Irregular heartbeat.     Severe symptoms    Severe  hypoglycemia is a medical emergency. It can cause:       Fainting.       Seizures.       Loss of consciousness (coma).       Death.     What nutrition changes can be made?         Work with your health care provider or diet and nutrition specialist (dietitian) to make a healthy meal plan that is right for you. Follow your meal plan carefully.       Eat meals at regular times.       If recommended by your health care provider, have snacks between meals.      Do not  skip or delay meals or snacks. You can be at risk for hypoglycemia if you are not getting enough carbohydrates.     What lifestyle changes can be made?           Work closely with your health care provider to manage your blood glucose. Make sure you know:       Your goal blood glucose levels.       How and when to check your blood glucose.       The symptoms of hypoglycemia. It is important to treat it right away to keep it from becoming severe.      Do not  drink alcohol on an empty stomach.       When you are ill, check your blood glucose more often than usual. Follow your sick day plan whenever you cannot eat or drink normally. Make this plan in advance with your health care provider.       Always check your blood glucose before, during, and after exercise.       How is this treated?    This condition can often be treated by immediately eating or drinking something that contains sugar, such as:       Fruit juice, 4?6 oz (120?150 mL).       Regular (not diet) soda, 4?6 oz (120?150 mL).       Low-fat milk, 4 oz (120 mL).       Several pieces of hard candy.       Sugar or honey, 1 Tbsp (15 mL).     Treating hypoglycemia if you have diabetes    If you are alert and able to swallow safely, follow the 15:15 rule  :      Take 15 grams of a rapid-acting carbohydrate.  Talk with your health care provider about how much you should take.      Rapid-acting options include:       Glucose pills (take 15 grams).       6?8 pieces of hard candy.       4?6 oz (120?150  mL) of fruit juice.       4?6 oz (120?150 mL) of regular (not diet) soda.      Check your blood glucose 15 minutes after you take the carbohydrate.       If the repeat blood glucose level is still at or below 70 mg/dL (3.9 mmol/L), take 15 grams of a carbohydrate again.       If your blood glucose level does not increase above 70 mg/dL (3.9 mmol/L) after 3 tries, seek emergency medical care.       After your blood glucose level returns to normal, eat a meal or a snack within 1 hour.     Treating severe hypoglycemia   Severe hypoglycemia is when your blood glucose level is at or below 54 mg/dL (3 mmol/L). Severe hypoglycemia is a medical emergency. Get medical help right away.   If you have severe hypoglycemia and you cannot eat or drink, you may need an injection of glucagon. A family member or close friend should learn how to check your blood glucose and how to give you a glucagon injection. Ask your health care provider if you need to have an emergency glucagon injection kit available.   Severe hypoglycemia may need to be treated in a hospital. The treatment may include getting glucose through an IV. You may also need treatment for the cause of your hypoglycemia.   Where to find more information         American Diabetes Association: www.diabetes.org         National [place] of Diabetes and Digestive and Kidney Diseases: www.niddk.nih.gov       Contact a health care provider if:         You have problems keeping your blood glucose in your target range.       You have frequent episodes of hypoglycemia.     Get help right away if:         You continue to have hypoglycemia symptoms after eating or drinking something containing glucose.       Your blood glucose level is at or below 54 mg/dL (3 mmol/L).       You faint.       You have a seizure.     These symptoms may represent a serious problem that is an emergency. Do not wait to see if the symptoms will go away. Get medical help right away. Call your local  emergency services (911 in the [place]   Summary         Know the symptoms of hypoglycemia, and when you are at risk for it (such as during exercise or when you are sick). Check your blood glucose often when you are at risk for hypoglycemia.       Hypoglycemia can develop quickly, and it can be dangerous if it is not treated right away. If you have a history of severe hypoglycemia, make sure you know how to use your glucagon injection kit.       Make sure you know how to treat hypoglycemia. Keep a carbohydrate snack available when you may be at risk for hypoglycemia.     This information is not intended to replace advice given to you by your health care provider. Make sure you discuss any questions you have with your health care provider.     Document Released: 08/15/2018Document Revised: 04/10/2020Document Reviewed: 08/15/2018     [employer] Patient Education ? 2021 [employer]

## 2022-03-07 NOTE — DISCHARGE SUMMARY
[facility]   DISCHARGE SUMMARY      Name:  Millicent Mckeon   Age:  61 y.o.  Sex:  female  :  1960  MRN:  [id number]   Visit Number:  [id number]    Admission Date:  3/4/2022  Date of Discharge:  3/7/2022  Primary Care Physician:  Marianela Albright APRN    Important issues to note:    1.  Continue Augmentin and doxycycline for bilateral lower extremity cellulitis for 5 more days.  2.  Follow-up with Dr. Cutler in 3 weeks.  3.  Patient's Bumex dose has been increased to 1 mg twice daily.  4.  No other change has been made to her home medication regimen.  5.  She will be arranged home health services.  6.  Follow-up with primary care provider in 1 week.    Discharge Diagnoses:     1. Acute metabolic encephalopathy secondary to #2 and 3, POA, resolved.  2. Severe hypothermia, likely environmental, POA, resolved.  3. Acute hypoglycemia, POA, resolved.  4. Bilateral lower extremity cellulitis.  5. Chronic kidney disease stage IV.  6. Chronic systolic heart failure with ejection fraction of 35%.  7. Paroxysmal atrial fibrillation on apixaban.  8. Chronic cor pulmonale.  9. Chronic hypoxic respiratory failure on home oxygen.  10. Diabetes mellitus type 2 with nephropathy.  11. Chronic venous insufficiency of the lower extremities.  12. Morbid obesity with a BMI of 56.  13. Anemia of chronic kidney disease.  14. Functional quadriplegia.  15. Acquired hypothyroidism.    Problem List:     Active Hospital Problems    Diagnosis  POA   • Cellulitis of lower extremity [L03.119]  Yes     Priority: High   • Chronic kidney disease, stage IV (severe) (HCC) [N18.4]  Yes   • A-fib (HCC) [I48.91]  Yes   • Chronic venous hypertension involving both sides [I87.303]  Yes   • Cor pulmonale, chronic (HCC) [I27.81]  Yes   • Obesity, morbid, BMI 50 or higher (HCC) [E66.01]  Yes   • Acquired hypothyroidism [E03.9]  Yes   • Essential hypertension [I10]  Yes   • Type 2 diabetes mellitus with nephropathy (HCC)  [E11.21]  Yes      Resolved Hospital Problems    Diagnosis Date Resolved POA   • **Acute metabolic encephalopathy [G93.41] 03/07/2022 Yes   • Hypoglycemia [E16.2] 03/07/2022 Yes     Priority: High   • Hypothermia [T68.XXXA] 03/07/2022 Yes     Priority: High     Presenting Problem:    Chief Complaint   Patient presents with   • Altered Mental Status      Consults:     Consulting Physician(s)  Chat With All Active Members    Provider Relationship Specialty    Milad Leone MD  Consulting Physician Nephrology        Procedures Performed:    Placement of Wilkinson catheter and subsequent removal.    History of presenting illness/Hospital Course:    Ms. Mckeon is a chronically ill lady with history of atrial fibrillation, chronic systolic heart failure, chronic kidney disease stage IV, chronic cor pulmonale, morbid obesity, functional quadriplegia who is wheelchair-bound was brought to the emergency room by EMS after she was found on the front door of her house sitting in a wheelchair in the cold and confused.  When the EMS checked her fingerstick glucose and apparently it was in the 50s.  She was also noted to have significant ulcerations and redness in both her lower extremities.     In the emergency room, she was noted to have significant hypothermia with initial temperature of 88.9 and was placed on warming blanket.  Pulse was 80 with a blood pressure of 134/88.  Pulse oxygen saturation was 98% on room air.  Patient was given Narcan without any significant change in her mental status.  Her initial fingerstick was 62 and it dropped to 52 on her blood work and she was started on dextrose 10% infusion.  Blood work was remarkable for a creatinine of 3.19 (her baseline), BUN 56, BNP 18,778, procalcitonin 0.21.  Lactic acid was 1.1.  ABG done in the emergency room showed a pH of 7.37, PCO2 49, PO2 78 and bicarb of 28.  CT of the head was unremarkable.  CT of the chest was negative for any infiltrates.  Urinalysis was within  normal limits.       Patient was admitted to the medical ICU for hypothermia, metabolic encephalopathy, hypoglycemia as well as bilateral lower extremity cellulitis.  She was placed on dextrose infusion and IV antibiotics therapy with Rocephin and doxycycline.  She was placed on warming blanket.  Patient improved significantly over the next 12 hours with regards to her hypothermia as well as her mental status.  She was seen by Dr. Cutler from nephrology and was started on IV diuretic therapy for her significant anasarca.  She was transferred out to the medical floor with telemetry the next day.    Patient was diuresed with IV Lasix therapy and her volume status improved.  Her bilateral lower extremity cellulitis improved as well.  Patient refused to work with physical therapy.  She also has been refusing future dialysis.  Her CODE STATUS was discussed with the patient and she wanted to be DNR and her CODE STATUS was changed accordingly during this admission.  She is currently being discharged back to home with home health services.  She already has a wheelchair and she is wheelchair-bound.  She is advised to follow-up with Dr. Cutler in 3 weeks.  I have discussed the patient's condition and treatment plan with Dr. Cutler and her Bumex dose will be increased from 1 mg daily to twice daily.  She will be discharged home on antibiotics therapy with Augmentin and doxycycline for 5 more days.  She is advised to follow-up with her primary care provider in 1 week.    Vital Signs:    Temp:  [96.1 °F (35.6 °C)-99.1 °F (37.3 °C)] 97.1 °F (36.2 °C)  Heart Rate:  [104-106] 104  Resp:  [16-18] 16  BP: (118-142)/(77-88) 142/79    Physical Exam:    General Appearance:  Alert and cooperative.  Obese body habitus.   Head:  Atraumatic and normocephalic.   Eyes: Conjunctivae and sclerae normal, no icterus. No pallor.   Ears:  Ears with no abnormalities noted.   Throat: No oral lesions, no thrush, oral mucosa moist.   Neck: Supple, trachea  midline, no thyromegaly.   Back:   No kyphoscoliosis present. No tenderness to palpation.   Lungs:   Breath sounds heard bilaterally equally.  No wheezing.  Occasional basal crackles.  No Pleural rub or bronchial breathing.   Heart:  Normal S1 and S2, no murmur, no gallop, no rub. No JVD.   Abdomen:   Normal bowel sounds, no masses, no organomegaly. Soft, nontender, obese with a large pannus, no rebound tenderness.   Extremities: Supple, no cyanosis, no clubbing. 3-4+ pitting edema noted in both lower extremities up to mid thighs.   Pulses: Pulses palpable bilaterally.   Skin: No bleeding.  Multiple excoriations and small ulcers with yellow slough noted in lower extremities associated with erythema up to her knees.  Multiple excoriations noted on upper extremities in various stages of healing.   Neurologic: Alert and oriented x 3. No facial asymmetry. Moves all four limbs but does have generalized weakness. No tremors.     Pertinent Lab Results:     Results from last 7 days   Lab Units 03/07/22 0537 03/06/22  0552 03/05/22 0435 03/04/22  1108   SODIUM mmol/L 137 137 139 138   POTASSIUM mmol/L 4.4 4.6 4.3 3.8   CHLORIDE mmol/L 97* 99 100 99   CO2 mmol/L 25.5 25.0 23.0 23.7   BUN mg/dL 60* 56* 51* 56*   CREATININE mg/dL 3.55* 3.33* 3.18* 3.19*   CALCIUM mg/dL 7.9* 7.9* 7.9* 8.2*   BILIRUBIN mg/dL  --   --   --  0.6   ALK PHOS U/L  --   --   --  218*   ALT (SGPT) U/L  --   --   --  13   AST (SGOT) U/L  --   --   --  24   GLUCOSE mg/dL 207* 301* 201* 52*     Results from last 7 days   Lab Units 03/07/22 0537 03/05/22 0435 03/04/22  1108   WBC 10*3/mm3 6.36 6.88 6.29   HEMOGLOBIN g/dL 10.4* 10.0* 11.2*   HEMATOCRIT % 35.4 33.5* 37.5   PLATELETS 10*3/mm3 136* 127* 124*         Results from last 7 days   Lab Units 03/05/22  0435   TROPONIN T ng/mL 0.020     Results from last 7 days   Lab Units 03/04/22  1108   PROBNP pg/mL 18,778.0*             Results from last 7 days   Lab Units 03/04/22  1140   PH, ARTERIAL pH units  7.373   PO2 ART mm Hg 78.3   PCO2, ARTERIAL mm Hg 48.7*   HCO3 ART mmol/L 28.3*     Results from last 7 days   Lab Units 03/05/22  1344 03/04/22  2247 03/04/22  2239   BLOODCX   --  No growth at 2 days No growth at 2 days   WOUNDCX  Heavy growth (4+) Enterococcus species*  Heavy growth (4+) Normal Skin Chrissie  --   --      Pertinent Radiology Results:    Imaging Results (All)     Procedure Component Value Units Date/Time    CT Head Without Contrast [[id number]] Collected: 03/04/22 1237     Updated: 03/04/22 1240    Narrative:      PROCEDURE: CT HEAD WO CONTRAST-     HISTORY: AMS, altered mental status     COMPARISON: 1/13/2019     TECHNIQUE: Noncontrast exam     FINDINGS: Brain parenchyma is homogeneous without evidence of  hemorrhage, mass effect or edema. No extra-axial abnormality is noted.  Ventricles and cisterns appear normal.     The visualized sinuses, orbits and petrous temporal bones appear  unremarkable.       Impression:      Unremarkable unenhanced CT of the brain.         This study was performed with techniques to keep radiation doses as low  as reasonably achievable (ALARA). Individualized dose reduction  techniques using automated exposure control or adjustment of vA and/or  kV according to the patient size were employed.      This report was signed and finalized on 3/4/2022 12:38 PM by Fito Andino MD.    XR Chest 1 View [[id number]] Collected: 03/04/22 1138     Updated: 03/04/22 1141    Narrative:      PROCEDURE: XR CHEST 1 VW-     HISTORY: AMS , hypertension     COMPARISON:  1/31/2022     FINDINGS:  Portable view of the chest demonstrates the lungs to be  grossly clear. There is no evidence of effusion, pneumothorax or other  significant pleural disease. The mediastinum is unremarkable.     The heart size is moderately enlarged.       Impression:      No acute findings      This report was signed and finalized on 3/4/2022 11:38 AM by Fito Andino MD.        Echo:    Results for orders placed  during the hospital encounter of 03/04/22    Adult Transthoracic Echo Complete W/ Cont if Necessary Per Protocol    Interpretation Summary  · The left ventricular cavity is mildly dilated.  · Left ventricular wall thickness is consistent with mild concentric hypertrophy.  · Estimated left ventricular EF = 48% Left ventricular ejection fraction appears to be 46 - 50%. Left ventricular systolic function is low normal.  · Left ventricular diastolic function is consistent with (grade II w/high LAP) pseudonormalization.  · The right atrial cavity is mildly dilated.  · The right ventricular cavity is mildly dilated.  · There is moderate calcification of the aortic valve mainly affecting the non-coronary, left coronary and right coronary cusp(s).  · Moderate to severe tricuspid valve regurgitation is present.  · Estimated right ventricular systolic pressure from tricuspid regurgitation is markedly elevated (>55 mmHg). Calculated right ventricular systolic pressure from tricuspid regurgitation is 63 mmHg.  · Severe pulmonary hypertension is present.    Condition on Discharge:      Stable.    Code status during the hospital stay:    Code Status and Medical Interventions:   Ordered at: 03/05/22 1350     Medical Intervention Limits:    NO intubation (DNI)    NO cardioversion    NO dialysis     Level Of Support Discussed With:    Health Care Surrogate    Patient     Code Status (Patient has no pulse and is not breathing):    No CPR (Do Not Attempt to Resuscitate)     Medical Interventions (Patient has pulse or is breathing):    Limited Support     Discharge Disposition:    Home-Health Care [facility]    Discharge Medications:       Discharge Medications      New Medications      Instructions Start Date   amoxicillin-clavulanate 500-125 MG per tablet  Commonly known as: Augmentin   500 mg, Oral, 2 Times Daily      doxycycline 100 MG capsule  Commonly known as: VIBRAMYCIN   100 mg, Oral, 2 Times Daily         Changes to Medications       Instructions Start Date   bumetanide 1 MG tablet  Commonly known as: Bumex  What changed: when to take this   1 mg, Oral, 2 Times Daily         Continue These Medications      Instructions Start Date   acetaminophen 325 MG tablet  Commonly known as: TYLENOL   650 mg, Oral, Every 4 Hours PRN      amiodarone 100 MG tablet  Commonly known as: PACERONE   100 mg, Oral, Every 48 Hours      ammonium lactate 12 % cream  Commonly known as: AMLACTIN   1 application, Topical, 2 Times Daily      apixaban 5 MG tablet tablet  Commonly known as: ELIQUIS   5 mg, Oral, Every 12 Hours Scheduled      arginine 500 MG tablet   500 mg, Oral, Daily      aspirin 81 MG chewable tablet   81 mg, Oral, Daily      docusate sodium 100 MG capsule  Commonly known as: COLACE   100 mg, Oral, 2 Times Daily      ferrous sulfate 324 (65 Fe) MG tablet delayed-release EC tablet   324 mg, Oral, 2 Times Daily With Meals      insulin aspart 100 UNIT/ML injection  Commonly known as: novoLOG   Subcutaneous, 2 Times Daily, Sliding scale, low dose      isosorbide mononitrate 10 MG tablet  Commonly known as: ISMO,MONOKET   15 mg, Oral, Daily      Jobst Opaque Knee 20-30mmHg XL misc   1 application, Does not apply, Daily      levothyroxine 150 MCG tablet  Commonly known as: SYNTHROID, LEVOTHROID   150 mcg, Oral, Daily      lidocaine 5 %  Commonly known as: LIDODERM   1 patch, Transdermal, Every 24 Hours, Apply patch to left knee daily, on at 0700 am off at 2000       metoprolol tartrate 100 MG tablet  Commonly known as: LOPRESSOR   100 mg, Oral, 2 Times Daily      nitroglycerin 0.4 MG SL tablet  Commonly known as: NITROSTAT   0.4 mg, Sublingual, Every 5 Minutes PRN, Take no more than 3 doses in 15 minutes.       ondansetron ODT 4 MG disintegrating tablet  Commonly known as: ZOFRAN-ODT   DISSOLVE 1 TABLET ON THE TONGUE EVERY 8 HOURS AS NEEDED FOR NAUSEA OR VOMITING      pantoprazole 40 MG EC tablet  Commonly known as: PROTONIX   40 mg, Oral, Daily       polyethylene glycol packet  Commonly known as: MIRALAX   17 g, Oral, 2 Times Daily      PROTEIN SUPPLEMENT 80% PO   30 mL, Oral, 2 Times Daily      RA Vitamin C 500 MG tablet  Generic drug: ascorbic acid   500 mg, Oral, Daily      saccharomyces boulardii 250 MG capsule  Commonly known as: FLORASTOR   250 mg, Oral, 2 Times Daily      simvastatin 20 MG tablet  Commonly known as: ZOCOR   20 mg, Oral, Nightly      spironolactone 25 MG tablet  Commonly known as: Aldactone   25 mg, Oral, 3 Times Weekly, Monday, Wednesday and Friday.      Thera tablet tablet   TAKE 1 TABLET BY MOUTH EVERY DAY      Zinc Sulfate 220 (50 Zn) MG tablet   1 tablet, Oral, 2 Times Daily           Discharge Diet:     Diet Instructions     Diet: Consistent Carbohydrate, Renal; Thin      Discharge Diet:  Consistent Carbohydrate  Renal       Fluid Consistency: Thin    Fluid Restriction per day: 1500 mL Fluid        Activity at Discharge:     Activity Instructions     Activity as Tolerated          Follow-up Appointments:    Additional Instructions for the Follow-ups that You Need to Schedule     Ambulatory Referral to Home Health (Hospital)   As directed      Face to Face Visit Date: 3/7/2022    Follow-up provider for Plan of Care?: I treated the patient in an acute care facility and will not continue treatment after discharge.    Follow-up provider: SUN TRAN [[id number]]    Reason/Clinical Findings: B/L leg cellulitis, sepsis, AMS, DM2, CKD4, CHF, Obesity    Describe mobility limitations that make leaving home difficult: Wheelchair bound, Fall risk    Nursing/Therapeutic Services Requested: Skilled Nursing Physical Therapy Occupational Therapy    Skilled nursing orders: Medication education CHF management Wound care dressing/changes Cardiopulmonary assessments    PT orders: Therapeutic exercise Transfer training Strengthening Home safety assessment    Occupational orders: Activities of daily living Strengthening    Frequency: 1 Week 1             Contact information for follow-up providers     Marianela Albright APRN. Schedule an appointment as soon as possible for a visit on 3/7/2022.    Specialty: Family Medicine  Why: office closed  have Pt call in the AM  Contact information:  [address] [place] [id number] [phone]             Milad Leone MD Follow up on 3/24/2022.    Specialties: Nephrology, Hospitalist  Why: @ 1:20 pm  Contact information:  [address] [place]  [facility] [id number] [phone]                   Contact information for after-discharge care     Home Medical Care     [facility] [place] .    Service: Home Health Services  Contact information:  [address] [facility] [address]  [facility] [address]  [phone]                           Future Appointments   Date Time Provider Department [place]   5/4/2022 11:15 AM Kevin Aceves MD [facility]     Test Results Pending at Discharge:    Pending Labs     Order Current Status    Blood Culture With LYDIA - Blood, Arm, Left Preliminary result    Blood Culture With LYDIA - Blood, Hand, Left Preliminary result    Wound Culture - Wound, Leg, Left Preliminary result             Samson Reyes MD  03/07/22  12:36 EST    Time: I spent 35 minutes on this discharge activity which included: face-to-face encounter with the patient, reviewing the data in the system, coordination of the care with the nursing staff as well as consultants, documentation, and entering orders.     Dictated utilizing Dragon dictation.

## 2022-03-07 NOTE — PLAN OF CARE
Goal Outcome Evaluation:  Plan of Care Reviewed With: patient        Progress: improving  Outcome Evaluation: Patient did well overnight.  Vital signs have remained stable and will continue to monitor. no overnight events to report.

## 2022-03-07 NOTE — EXTERNAL PATIENT INSTRUCTIONS
Patient Education   Table of Contents       Amoxicillin; Clavulanic Acid Chewable Tablets       Confusion       Doxycycline tablets or capsules       Hypothermia       Preventing Hypothermia     To view videos and all your education online visit,   [email or web address]   or scan this QR code with your smartphone.                  Amoxicillin; Clavulanic Acid Chewable Tablets     What is this medicine?   AMOXICILLIN; CLAVULANIC ACID (a mox i MANDO in; JOHN gaston wilda ic AS id) is a penicillin antibiotic. It treats some infections caused by bacteria. It will not work for colds, the flu, or other viruses.   This medicine may be used for other purposes; ask your health care provider or pharmacist if you have questions.   COMMON BRAND NAME(S): Augmentin   What should I tell my health care provider before I take this medicine?   They need to know if you have any of these conditions:         kidney disease       liver disease       mononucleosis       phenylketonuria       stomach or intestine problems such as colitis       an unusual or allergic reaction to amoxicillin, other penicillin or cephalosporin antibiotics, clavulanic acid, other medicines, foods, dyes, or preservatives       pregnant or trying to get pregnant       breast-feeding     How should I use this medicine?   Take this drug by mouth. Take it as directed on the prescription label at the same time every day. Chew or crush it completely before swallowing. Do not swallow tablets whole. You can take it with or without food. If it upsets your stomach, take it with food. Take all of this drug unless your health care provider tells you to stop it early. Keep taking it even if you think you are better.   Talk to your health care provider about the use of this drug in children. While it may be prescribed for selected conditions, precautions do apply.   Overdosage: If you think you have taken too much of this medicine contact a poison control center or  emergency room at once.   NOTE: This medicine is only for you. Do not share this medicine with others.   What if I miss a dose?   If you miss a dose, take it as soon as you can. If it is almost time for your next dose, take only that dose. Do not take double or extra doses.   What may interact with this medicine?         allopurinol       anticoagulants       birth control pills       methotrexate       probenecid     This list may not describe all possible interactions. Give your health care provider a list of all the medicines, herbs, non-prescription drugs, or dietary supplements you use. Also tell them if you smoke, drink alcohol, or use illegal drugs. Some items may interact with your medicine.   What should I watch for while using this medicine?   Tell your health care provider if your symptoms do not start to get better or if they get worse.   This medicine may cause serious skin reactions. They can happen weeks to months after starting the medicine. Contact your health care provider right away if you notice fevers or flu-like symptoms with a rash. The rash may be red or purple and then turn into blisters or peeling of the skin. Or, you might notice a red rash with swelling of the face, lips or lymph nodes in your neck or under your arms.   This product may contain aspartame, which is a source of phenylalanine. If you have phenylketonuria (PKU), contact your health care provider for advice.   Do not treat diarrhea with over the counter products. Contact your health care provider if you have diarrhea that lasts more than 2 days or if it is severe and watery.   If you have diabetes, you may get a false-positive result for sugar in your urine. Check with your health care provider.   Birth control may not work properly while you are taking this medicine. Talk to your health care provider about using an extra method of birth control.   What side effects may I notice from receiving this medicine?   Side effects  that you should report to your doctor or health care provider as soon as possible:         allergic reactions (skin rash, itching or hives; swelling of the face, lips, or tongue)       bloody or watery diarrhea       dark urine       infection (fever, chills, cough, sore throat, or pain)       kidney injury (trouble passing urine or change in the amount of urine)       redness, blistering, peeling, or loosening of the skin, including inside the mouth       seizures       thrush (white patches in the mouth or mouth sores)       trouble breathing       unusual bruising or bleeding       unusually weak or tired     Side effects that usually do not require medical attention (report to your doctor or health care provider if they continue or are bothersome):         diarrhea       dizziness       headache       nausea, vomiting       unusual vaginal discharge, itching, or odor       upset stomach     This list may not describe all possible side effects. Call your doctor for medical advice about side effects. You may report side effects to FDA at 9-154-GFX-3151.   Where should I keep my medicine?   Keep out of the reach of children and pets.   Store at room temperature between 20 and 25 degrees C (68 and 77 degrees F). Throw away any unused drug after the expiration date.   NOTE: This sheet is a summary. It may not cover all possible information. If you have questions about this medicine, talk to your doctor, pharmacist, or health care provider.     ? 2021 Elsevier/Gold Standard (2021-12-11 11:01:44)         Confusion     Confusion is the inability to think with your usual speed or clarity. Confusion can be caused by many things. People who are confused often describe their thinking as cloudy or unclear. Confusion can also include feeling disoriented. This means you are unaware of where you are or who you are. You may also not know the date or time. When confused, you may have trouble remembering, paying attention, or  making decisions. Some people also act aggressively when they are confused.   In some cases, confusion may come on quickly. In other cases, it may develop slowly over time.    Confusion may be caused by medical conditions such as:       Infections, such as a urinary tract infection (UTI).       Low levels of oxygen, which can develop from conditions such as long-term lung disorders.       Decrease in brain function due to dementia and other conditions that affect the brain, such as seizures, strokes, brain tumors, or head injuries.       Mental health conditions, like panic attacks, anxiety, depression, and hallucinations.      Confusion may also be caused by physical factors such as:       Loss of fluid (dehydration) or an imbalance of salts and minerals in the body (electrolytes).       Lack of certain nutrients like niacin, thiamine, or other B vitamins.       Fever or hypothermia, which is a sudden drop in body temperature.       Low or high blood sugar.       Low or high blood pressure.      Other causes include:       Lack of sleep or changes in routine or surroundings, such as when traveling or staying in a hospital.       Using too much alcohol, drugs, or medicine.       Side effects of medicines, or taking medicines that affect other medicines (drug interactions).     Follow these instructions at home:   Pay attention to your symptoms. Tell your health care provider about any changes or if you develop new symptoms. Follow these instructions to control or treat symptoms. Ask a family member or friend for help if needed.   Medicines            Take over-the-counter and prescription medicines only as told by your health care provider.       Ask your health care provider about changing or stopping any medicines that may be causing your confusion.       Avoid pain medicines or sleep medicines until you have fully recovered.       Use a pillbox or an alarm to help you take the right medicines at the right time.      Lifestyle            Eat a balanced diet that includes fruits and vegetables.       Get enough sleep. For most adults, this is 7?9 hours each night.      Do not  drink alcohol.      Do not  become isolated. Spend time with other people and make plans for your days.      Do not  drive until your health care provider says that it is safe to do so.      Do not  use any products that contain nicotine or tobacco, such as cigarettes, e-cigarettes, and chewing tobacco. If you need help quitting, ask your health care provider.       Stop other activities that may increase your chances of getting hurt. These may include some work duties, sports activities, swimming, or bike riding. Ask your health care provider what activities are safe for you.       Tips for caregivers         Find out if the person is confused. Ask the person to state his or her name, age, and the date. If the person is unsure or answers incorrectly, he or she may be confused and need assistance.       Always introduce yourself, no matter how well the person knows you. Remind the person of his or her location.       Place a calendar and clock near the person who is confused. Keep a regular schedule. Make sure the person has plenty of light during the day and sleep at night.       Talk about current events and plans for the day.       Keep the environment calm, quiet, and peaceful.      Help the person do the things that he or she is unable to do. These include:       Taking medicines.       Keeping medical appointments.       Helping with household duties, including meal preparation.       Running errands.       Get help if you need it. There are several support groups for caregivers. If the person you are helping needs more support, consider day care, extended-care programs, or a skilled nursing facility. The person's health care provider may be able to help evaluate these options.     General instructions        Monitor yourself for any conditions  you may have. These can include:       Checking your blood glucose levels if you have diabetes.       Maintaining a healthy weight.       Monitoring your blood pressure if you have hypertension.       Monitoring your body temperature if you have a fever.       Keep all follow-up visits. This is important.       Contact a health care provider if:         You have new symptoms or your symptoms get worse.     Get help right away if you:         Feel that you are not able to care for yourself.       Develop severe headaches, repeated vomiting, seizures, blackouts, or slurred speech.       Have increasing confusion, weakness, numbness, restlessness, or personality changes.       Develop a loss of balance, have marked dizziness, feel uncoordinated, or fall.       Develop severe anxiety, or you have delusions or hallucinations.     These symptoms may represent a serious problem that is an emergency. Do not wait to see if the symptoms will go away. Get medical help right away. Call your local emergency services (911 in the [place] Do not drive yourself to the hospital.   Summary         Confusion is the inability to think with your usual speed or clarity. People who are confused often describe their thinking as cloudy or unclear.       Confusion can also include having trouble remembering, paying attention, or making decisions.       Confusion may come on quickly or develop slowly over time, depending on the cause. There are many different causes of confusion.       Ask for help from family members or friends if you are unable to take care of yourself.     This information is not intended to replace advice given to you by your health care provider. Make sure you discuss any questions you have with your health care provider.     Document Released: 01/25/2006Document Revised: 04/13/2021Document Reviewed: 04/13/2021     [employer] Patient Education ? 2021 [employer]         Doxycycline tablets or capsules     What is this  medicine?   DOXYCYCLINE (dox angeli keene) is a tetracycline antibiotic. It kills certain bacteria or stops their growth. It is used to treat many kinds of infections, like dental, skin, respiratory, and urinary tract infections. It also treats acne, Lyme disease, malaria, and certain sexually transmitted infections.   This medicine may be used for other purposes; ask your health care provider or pharmacist if you have questions.   COMMON BRAND NAME(S): Acticlate, Adoxa, Adoxa CK, Adoxa Isaias, Adoxa TT, Alodox, Avidoxy, Doxal, LYMEPAK, Mondoxyne NL, Monodox, Morgidox 1x, Morgidox 1x Kit, Morgidox 2x, Morgidox 2x Kit, NutriDox, Ocudox, Okebo, Periostat, TARGADOX, Vibra-Tabs, Vibramycin   What should I tell my health care provider before I take this medicine?   They need to know if you have any of these conditions:         liver disease       long exposure to sunlight like working outdoors       stomach problems like colitis       an unusual or allergic reaction to doxycycline, tetracycline antibiotics, other medicines, foods, dyes, or preservatives       pregnant or trying to get pregnant       breast-feeding     How should I use this medicine?   Take this medicine by mouth with a full glass of water. Follow the directions on the prescription label. It is best to take this medicine without food, but if it upsets your stomach take it with food. Take your medicine at regular intervals. Do not take your medicine more often than directed. Take all of your medicine as directed even if you think you are better. Do not skip doses or stop your medicine early.   Talk to your pediatrician regarding the use of this medicine in children. While this drug may be prescribed for selected conditions, precautions do apply.   Overdosage: If you think you have taken too much of this medicine contact a poison control center or emergency room at once.   NOTE: This medicine is only for you. Do not share this medicine with others.   What if I  miss a dose?   If you miss a dose, take it as soon as you can. If it is almost time for your next dose, take only that dose. Do not take double or extra doses.   What may interact with this medicine?         antacids       barbiturates       birth control pills       bismuth subsalicylate       carbamazepine       methoxyflurane       other antibiotics       phenytoin       vitamins that contain iron       warfarin     This list may not describe all possible interactions. Give your health care provider a list of all the medicines, herbs, non-prescription drugs, or dietary supplements you use. Also tell them if you smoke, drink alcohol, or use illegal drugs. Some items may interact with your medicine.   What should I watch for while using this medicine?   Tell your doctor or health care professional if your symptoms do not improve.   Do not treat diarrhea with over the counter products. Contact your doctor if you have diarrhea that lasts more than 2 days or if it is severe and watery.   Do not take this medicine just before going to bed. It may not dissolve properly when you lay down and can cause pain in your throat. Drink plenty of fluids while taking this medicine to also help reduce irritation in your throat.   This medicine can make you more sensitive to the sun. Keep out of the sun. If you cannot avoid being in the sun, wear protective clothing and use sunscreen. Do not use sun lamps or tanning beds/booths.   Birth control pills may not work properly while you are taking this medicine. Talk to your doctor about using an extra method of birth control.   If you are being treated for a sexually transmitted infection, avoid sexual contact until you have finished your treatment. Your sexual partner may also need treatment.   Avoid antacids, aluminum, calcium, magnesium, and iron products for 4 hours before and 2 hours after taking a dose of this medicine.   If you are using this medicine to prevent malaria, you  should still protect yourself from contact with mosquitos. Stay in screened-in areas, use mosquito nets, keep your body covered, and use an insect repellent.   What side effects may I notice from receiving this medicine?   Side effects that you should report to your doctor or health care professional as soon as possible:         allergic reactions like skin rash, itching or hives, swelling of the face, lips, or tongue       difficulty breathing       fever       itching in the rectal or genital area       pain on swallowing       rash, fever, and swollen lymph nodes       redness, blistering, peeling or loosening of the skin, including inside the mouth       severe stomach pain or cramps       unusual bleeding or bruising       unusually weak or tired       yellowing of the eyes or skin     Side effects that usually do not require medical attention (report to your doctor or health care professional if they continue or are bothersome):         diarrhea       loss of appetite       nausea, vomiting     This list may not describe all possible side effects. Call your doctor for medical advice about side effects. You may report side effects to FDA at 0-103-RZH-5918.   Where should I keep my medicine?   Keep out of the reach of children.   Store at room temperature, below 30 degrees C (86 degrees F). Protect from light. Keep container tightly closed. Throw away any unused medicine after the expiration date. Taking this medicine after the expiration date can make you seriously ill.   NOTE: This sheet is a summary. It may not cover all possible information. If you have questions about this medicine, talk to your doctor, pharmacist, or health care provider.     ? 2021 Elsevier/Gold Standard (2020-03-19 13:44:53)         Hypothermia     Hypothermia is a drop in body temperature to 95?F (35?C) or lower. The average body temperature is between 97?F (36.1?C) and 99?F (37.2?C).   Hypothermia is life-threatening because your  organs cannot work properly when your temperature is too low.   What are the causes?    This condition may be caused by:       Being in cold environments without being dressed warmly enough (environmental exposure).       Getting wet, such as from rain or snow, or from falling into cold water.       Having medical conditions that affect body temperature, such as hypothyroidism, diabetes, Parkinson's disease, or neurological conditions.       Taking medicines that affect the body's ability to control temperature.       Getting injured outdoors, such as from drowning or being caught in an avalanche.     What increases the risk?    This condition is more likely to develop in:       People who are outside for long periods of time, such as hikers, hunters, or people who are homeless. This also includes those who work outdoors.       People who use substances such as sedatives, narcotics, mood altering substances, or take medicines that affect the body's ability to control temperature.       People who have difficulty moving around (impaired mobility).       Babies.       Older adults.       People who have chronic kidney, heart or lung conditions, or who have a medical condition that affects body temperature.       People who drink too much alcohol or who drink it too often.     What are the signs or symptoms?    At first, symptoms of hypothermia may include:       Cool or cold skin and pale skin.       Shivering. This happen early in hypothermia and may stop as the condition gets worse. Hypothermia does not always cause shivering.       Slow thinking, speech, and response time.       Sleepiness.       Confusion.       A puffy or swollen face.       Numbness.       In infants, skin that is red and cold.      As hypothermia gets worse, the following symptoms may develop:       Uncoordinated movements.       Significant confusion or inability to form words.       Slow, shallow breathing.       Slow, weak pulse.       Loss  of consciousness.       Cardiac arrest.     How is this diagnosed?    This condition is diagnosed by taking your temperature and having a history of cold. You may also have tests, such as:       Blood tests.       Blood sugar (glucose) tests.       Urine tests.       Liver tests.       Heart tests, such as an electrocardiogram (ECG) and continuous cardiac monitoring.     How is this treated?       Hypothermia is an emergency that needs to be treated in a hospital. The first priority is to provide a warm environment immediately. This always means getting out of the cold exposure, and out of cold or wet clothing. Treatment for hypothermia depends on how severe it is. Treatment may include:       Receiving IV fluids. These fluids may be warmed.       Warming. Warm blankets or special fans and heating devices that circulate warm air may be used. Heating pads or packs on the body may be used as well.       Receiving warm, humidified oxygen through tubing (nasal cannula), an oxygen mask, or, in very severe cases, a breathing tube.       Receiving a warm solution through a small tube that goes into the stomach or bladder. In severe cases, warm liquids may also be used over the intestines (cavity lavage).       Rewarming the blood by removing it, passing it through a hemodialysis machine and returning it to the body at gradually increasing temperatures. This is sometimes done in the most severe cases.     It is important to seek treatment as soon as possible.     How is this prevented?            If you have an increased risk of developing hypothermia, keep your home at 68?F (20?C) or warmer.       Stay alert for dangerous weather situations and plan accordingly. Keep warm items in your house and car and observe safety precautions from government or weather officials.       Get out of cold environments, including water, right away and replace wet clothing with dry clothing as soon as possible.      Do not  ignore signs that  you are cold, such as shivering, cold feet and hands, and numbness or trouble speaking.      If you know you will be in a cold environment:       Wear appropriate layers of clothing.       Wear a waterproof jacket if you are outside in the rain or snow.       Avoid spending long periods of time outside without access to a warm environment.      Do not  drink alcohol. Alcohol causes your blood vessels to get larger (dilate) which makes you lose body heat.       Bring extra warming supplies with you. This includes extra dry clothes, blankets, and portable heaters, if possible.       Contact a health care provider if:        You have signs of hypothermia, including:       Shivering for a long time.       Cold, pale skin.       Confusion.       Numbness.       A bluish color of the face, hands, and feet.     Get help right away if:         You have severe confusion, a bad headache, or you feel very sleepy.       You shiver uncontrollably, or you stop shivering even though you are still cold.       You develop chest pain, shortness of breath, headache, dizziness, or lightheadedness.       You develop severe numbness or discoloration of your face, hands, or feet.     These symptoms may represent a serious problem that is an emergency. Do not wait to see if the symptoms will go away. Get medical help right away. Call your local emergency services (911 in the [place] Do not drive yourself to the hospital.   Summary         Hypothermia is a drop in body temperature to 95?F (35?C) or lower.       Hypothermia is life-threatening because your organs cannot work properly when your temperature is too low.       Symptoms may range from mild to extremely severe. Symptoms may include shivering, skin color changes, tiredness, confusion, chest pain, and shortness of breath.       Treatment for hypothermia depends on how severe it is. The first priority is to provide a warm environment immediately. This always means getting out of the  cold environment, and out of cold or wet clothing.     This information is not intended to replace advice given to you by your health care provider. Make sure you discuss any questions you have with your health care provider.     Document Released: 06/07/2011Document Revised: 03/30/2021Document Reviewed: 03/30/2021     Elsevier Patient Education ? 2021 [employer]         Preventing Hypothermia     Hypothermia is a drop in body temperature to 95?F (35?C) or lower. The average normal body temperature is between 97?F (36.1?C) and 99?F (37.2?C). Hypothermia is usually caused by exposure to cold temperatures, but it can also be caused by diseases that change the body's temperature.   How can this condition affect me?   Hypothermia is a serious condition that can be life-threatening if it is not treated. Hypothermia is life-threatening because your organs cannot work properly when your temperature is too low.   What can increase my risk?         Being an older adult or having certain medical conditions. If you are outside in cold weather, make sure you have access to warm clothing and a warm environment. Plan to be able to get out of the cold and warm yourself frequently.       Drinking alcohol. Avoid drinking alcohol if you are going to be outside in cold weather or before going to bed in a very cold room. Alcohol causes your body to lose heat.       Overusing certain medicines. Some medicines can make it harder for you to recognize that you are too cold.       Being outside in cold weather for a long period of time. This includes working outdoors for long periods of time.     What actions can I take to prevent hypothermia?   Prepare for cold weather         Understand that most cases of hypothermia develop at 30?50?F (1?10?C).       Be aware of the weather forecast and the expected temperatures when you are planning for outdoor activities. Know the predicted weather, including precipitation and wind chill.      Dress  appropriately for the cold.       The nose, ears, toes, chin, cheeks, and fingers are the body parts most commonly affected by the cold. Have your face, ears, and neck covered before going into cold weather. Make sure to wear a hat, gloves, scarf, socks, and boots to protect yourself.       Wear a thick, warm jacket with a lee that goes beyond your face to protecting against wind and blowing snow.      Wear layers. The number of layers needed will vary. In cold weather, three layers are often needed to protect you from hypothermia.       The innermost layer should take away (wick) sweat from the skin. Lightweight wool, polyester, or other moisture-wicking fabrics are best. Be careful when wearing cotton. It is the least protective material. It absorbs moisture and holds it next to your skin, making you colder.       The middle layer should insulate against cold. Fleece and wool are good fabrics for this. In some cases, you may need several insulating layers.       The outer layer should protect against wind, rain, and snow. It should also be breathable, meaning that it allows a little air to flow through it to help dry up any sweat.             Drink enough fluid to keep your urine pale yellow.      Do not  skip meals. Eat well-balanced meals.   When you are out in the cold         Stay dry. If any of your clothing gets wet, replace it right away. Your body loses heat more quickly if your clothes are wet.       If you begin to sweat and are wearing a few layers, take off a layer of clothing so that your clothing does not get wet. Put the layer back on after you are dry.      Do not  ignore shivering. Constant or violent shivering is a warning that your body temperature is dropping and that you may be close to having hypothermia. It is your body's way of trying to stay warm.       Stay out of the wind, rain, and snow if possible. Do not  stay outside for a long period of time. If your work requires you to be outside  for extended periods of time in cold weather, make sure to wear layers and try to take frequent breaks inside a warm environment.       Make sure you have access to a warm environment so that you can get out of the cold frequently.     If you fall in cold water    Water does not need to be extremely cold to cause hypothermia. Water that is colder than your normal body temperature will cause you to lose heat quickly. If you fall into cold water:       Get out of the water right away. If you cannot get out of the water, get as much of your body out of the water as possible by climbing onto anything floating nearby, such as an overturned boat.      Do not  try to swim unless land, a boat, another person, or a life jacket is close by. Swimming uses up energy and may shorten survival time.      Do not  remove your clothing until you are out of the water and can get dry or warm. When in the water, buckle, button, and zip up your clothes. The layer of clothing between the water and your body will help insulate you and keep you warm.       Hold your knees to your chest. The knee-to-chest position helps reduce heat loss.       If you have fallen into the water with other people, face each other, create a tight [place], and huddle together.     Remember to always wear a life jacket if you plan to ride in any watercraft. A life jacket can keep you alive longer in cold water by helping you float without using energy and by providing insulation to reduce heat loss.   If you are stranded in your car    Keep emergency supplies such as blankets, gloves, and a change of clothes in your car. If you get stranded in your car, take these actions:       Cover yourself with the blankets or clothing.       If there are other people in the car with you, huddle together.       Run the car for 10 minutes each hour to warm it up. Make sure a window is slightly open and the exhaust pipe is not covered with snow while the engine is running.        Call for help as soon as possible.     Preventing hypothermia in children       Children lose heat faster than adults. Accidental hypothermia can occur even with temperatures of 60?65?F (15.6?18.3?C), particularly among infants.       Infants less than 1 year of age should never sleep in a cold room. Keep the temperature of the room between 68?72?F (20?22.2?C). Infants should sleep in a wearable blanket sleeper to prevent loss of body heat.      To help prevent hypothermia when children are outside in the winter:       Dress infants and young children in one more layer than an adult would wear in the same conditions.       Limit the amount of time children spend outside in the cold. This is very important. Children may be less sensitive to feeling cold than adults and may not be able to tell when they need to warm up.       Have children come inside often to warm up.       Check your child's clothing for dampness or moisture. Remove anything wet that your child is wearing, including socks, clothes, gloves, or shoes and make sure your child has dry clothes to put on.       Where to find more information   Centers for Disease Control and Prevention (CDC):   www.cdc.gov     Summary         Hypothermia is usually caused by exposure to cold temperatures through cold environments.       Dress appropriately for the cold. Lightweight wool, polyester, or other moisture-wicking fabrics are best. If your work requires you to be outside for extended periods of time in cold weather, make sure to wear layers and try to take frequent breaks inside a warm environment.       If you are an older adult or have certain medical conditions, you will need more protection from the cold environment and you should plan to spend much less time outside in the cold.      Do not  ignore shivering. Constant or violent shivering is a warning that your body temperature is dropping and that you may be close to having hypothermia.       Children lose  heat faster than adults do. Take steps to help prevent hypothermia when children are outside in the winter, including checking their clothing and having them warm up frequently indoors.     This information is not intended to replace advice given to you by your health care provider. Make sure you discuss any questions you have with your health care provider.     Document Released: 12/15/2001Document Revised: 03/30/2021Document Reviewed: 03/30/2021     Elsevier Patient Education ? 2021 [employer]

## 2022-03-07 NOTE — PLAN OF CARE
Goal Outcome Evaluation:  Plan of Care Reviewed With: patient, family         Millicent is safe for DC home. She will have home-health services and EMS to transport. She is A+O X4. Follow-up appointments provided and discharge teaching complete.      .

## 2022-03-07 NOTE — CASE MANAGEMENT/SOCIAL WORK
Continued Stay Note  [facility]     Patient Name: Millicent Mckeon  MRN: [id number]  Today's Date: 3/7/2022    Admit Date: 3/4/2022     Discharge Plan     Row Name 03/07/22 0826       Plan    Plan called and spoke to Rosette at [facility], [phone], and stated would send order, they stated would look over and let us know; faxed order;   12:12 EST  [facility] and [facility] pt; stated to please send any PT notes when receive; informed pt they would see when discharged and completed imm with pt  13:27 EST  Informed [facility] that pt is going home today; faxed d/c summary per their request               Discharge Codes    No documentation.                     Kayley Mcnair RN

## 2022-03-08 ENCOUNTER — READMISSION MANAGEMENT (OUTPATIENT)
Dept: CALL CENTER | Facility: HOSPITAL | Age: 62
End: 2022-03-08

## 2022-03-08 LAB
BACTERIA SPEC AEROBE CULT: ABNORMAL
GRAM STN SPEC: ABNORMAL
GRAM STN SPEC: ABNORMAL

## 2022-03-08 NOTE — CASE MANAGEMENT/SOCIAL WORK
Discharge Planning Assessment   [facility]     Patient Name: Millicent Mckeon  MRN: [id number]  Today's Date: 3/8/2022    Admit Date: 3/4/2022     Discharge Needs Assessment    No documentation.                Discharge Plan     Row Name 03/08/22 0817       Plan    Plan Discharge summary faxed to [facility] as requested with success   No PT notes to fax because pt stated she was still too fatiqued              Continued Care and Services - Discharged on 3/7/2022 Admission date: 3/4/2022 - [place] [facility] [place]     Service Provider Request Status Selected Services Address Phone Fax Patient Preferred    [facility] - [facility] [address] [place] [address] [phone] --       Internal Comment last updated by Kayley Mcnair RN 3/7/2022 0829    Order faxed manually                           Expected Discharge Date and Time     Expected Discharge Date Expected Discharge Time    Mar 7, 2022          Demographic Summary    No documentation.                Functional Status    No documentation.                Psychosocial    No documentation.                Abuse/Neglect    No documentation.                Legal    No documentation.                Substance Abuse    No documentation.                Patient Forms    No documentation.                   Laura Don, RN

## 2022-03-08 NOTE — OUTREACH NOTE
Prep Survey    Flowsheet Row Responses   [facility] discharged from? [facility]   Is LACE score < 7 ? No   Emergency Room discharge w/ pulse ox? No   Eligibility Readm Mgmt   Discharge diagnosis **Acute metabolic encephalopathy    Does the patient have one of the following disease processes/diagnoses(primary or secondary)? Other   Does the patient have Home health ordered? Yes   What is the Home health agency?  ? [facility]   Is there a DME ordered? No   Prep survey completed? Yes          YURIDIA WARD - Registered Nurse

## 2022-03-09 ENCOUNTER — READMISSION MANAGEMENT (OUTPATIENT)
Dept: CALL CENTER | Facility: HOSPITAL | Age: 62
End: 2022-03-09

## 2022-03-09 LAB
BACTERIA SPEC AEROBE CULT: NORMAL
BACTERIA SPEC AEROBE CULT: NORMAL

## 2022-03-09 NOTE — OUTREACH NOTE
Medical Week 1 Survey    Flowsheet Row Responses   [facility] facility patient discharged from? [facility]   Does the patient have one of the following disease processes/diagnoses(primary or secondary)? Other   Week 1 attempt successful? No   Unsuccessful attempts Attempt 1          YESSI HUNTER - Registered Nurse

## 2022-03-10 ENCOUNTER — READMISSION MANAGEMENT (OUTPATIENT)
Dept: CALL CENTER | Facility: HOSPITAL | Age: 62
End: 2022-03-10

## 2022-03-10 NOTE — OUTREACH NOTE
Medical Week 1 Survey    Flowsheet Row Responses   [facility] patient discharged from? [facility]   Does the patient have one of the following disease processes/diagnoses(primary or secondary)? Other   Week 1 attempt successful? Yes   Call start time 1341   Call end time 1346   Discharge diagnosis Acute metabolic encephalopathy    Meds reviewed with patient/caregiver? Yes   Is the patient having any side effects they believe may be caused by any medication additions or changes? No   Does the patient have all medications ordered at discharge? Yes   Is the patient taking all medications as directed (includes completed medication regime)? Yes   Does the patient have a primary care provider?  Yes   Does the patient have an appointment with their PCP within 7 days of discharge? No   What is preventing the patient from scheduling follow up appointments within 7 days of discharge? Haven't had time   Nursing Interventions Advised patient to make appointment   Has the patient kept scheduled appointments due by today? N/A   What is the Home health agency?  [facility] is unsure if she needs them or not   Has home health visited the patient within 72 hours of discharge? No   Psychosocial issues? No   Did the patient receive a copy of their discharge instructions? Yes   Nursing interventions Reviewed instructions with patient   What is the patient's perception of their health status since discharge? Improving   Is the patient/caregiver able to teach back signs and symptoms related to disease process for when to call PCP? Yes   Is the patient/caregiver able to teach back signs and symptoms related to disease process for when to call 911? Yes   Is the patient/caregiver able to teach back the hierarchy of who to call/visit for symptoms/problems? PCP, Specialist, Home health nurse, Urgent Care, ED, 911 Yes   If the patient is a current smoker, are they able to teach back resources for cessation? Not a smoker   Week 1 call  completed? Yes          SHAYY WARD - Registered Nurse

## 2022-03-17 NOTE — CASE MANAGEMENT/SOCIAL WORK
Case Management Discharge Note      Final Note: patient got declined  by [facility]  and other  home healths    Provided Post Acute Provider List?: N/A  Provided Post Acute Provider Quality & Resource List?: N/A    Selected Continued Care - Discharged on 3/7/2022 Admission date: 3/4/2022 - Discharge disposition: Home-Health Care S[facility]    Destination    No services have been selected for the patient.              Durable Medical Equipment    No services have been selected for the patient.              Dialysis/Infusion    No services have been selected for the patient.              Home Medical Care     Service Provider Selected Services Address Phone Fax Patient Preferred    [facility] - [place]  [facility] [address] [place] [address] [address] [facility] [address] [place] [address] [phone] --       Internal Comment last updated by Kayley Mcnair RN 3/7/2022 3307    Order faxed manually                       Therapy    No services have been selected for the patient.              Community Resources    No services have been selected for the patient.              Community & DME    No services have been selected for the patient.                       Final Discharge Disposition Code: 01 - home or self-care

## 2022-03-21 ENCOUNTER — READMISSION MANAGEMENT (OUTPATIENT)
Dept: CALL CENTER | Facility: HOSPITAL | Age: 62
End: 2022-03-21

## 2022-03-21 NOTE — OUTREACH NOTE
Medical Week 2 Survey    Flowsheet Row Responses   [facility] patient discharged from? [facility]   Does the patient have one of the following disease processes/diagnoses(primary or secondary)? Other   Week 2 attempt successful? Yes   Call start time 0849   Discharge diagnosis Acute metabolic encephalopathy    Call end time 0852   Person spoke with today (if not patient) and relationship Val-POA    Meds reviewed with patient/caregiver? Yes   Is the patient taking all medications as directed (includes completed medication regime)? Yes   Comments regarding appointments Appt with Dr. Sulema caputo 3/24/22,  Appt with karen is on 5/4/22   Does the patient have an appointment with their PCP within 7 days of discharge? Greater than 7 days   Comments regarding PCP 3/15/22-did not go but Val will reschedule   Has the patient kept scheduled appointments due by today? No   What is preventing the patient from keeping their appointments? Doesn't understand importance   Nursing Interventions --  [Val will reschedule]   What is the patient's perception of their health status since discharge? Improving   Week 2 Call Completed? Yes          SHAYY WARD - Registered Nurse

## 2022-03-30 ENCOUNTER — READMISSION MANAGEMENT (OUTPATIENT)
Dept: CALL CENTER | Facility: HOSPITAL | Age: 62
End: 2022-03-30

## 2022-03-30 ENCOUNTER — APPOINTMENT (OUTPATIENT)
Dept: GENERAL RADIOLOGY | Facility: HOSPITAL | Age: 62
End: 2022-03-30

## 2022-03-30 ENCOUNTER — HOSPITAL ENCOUNTER (INPATIENT)
Facility: HOSPITAL | Age: 62
LOS: 13 days | Discharge: HOME HEALTH | End: 2022-04-14
Attending: EMERGENCY MEDICINE | Admitting: INTERNAL MEDICINE

## 2022-03-30 DIAGNOSIS — N18.6 ESRD (END STAGE RENAL DISEASE): ICD-10-CM

## 2022-03-30 DIAGNOSIS — E16.2 HYPOGLYCEMIA: Primary | ICD-10-CM

## 2022-03-30 DIAGNOSIS — N17.9 ACUTE RENAL FAILURE, UNSPECIFIED ACUTE RENAL FAILURE TYPE: ICD-10-CM

## 2022-03-30 DIAGNOSIS — R62.7 FAILURE TO THRIVE IN ADULT: ICD-10-CM

## 2022-03-30 DIAGNOSIS — L89.610 PRESSURE ULCER OF RIGHT HEEL, UNSTAGEABLE: ICD-10-CM

## 2022-03-30 DIAGNOSIS — E66.01 OBESITY, MORBID, BMI 50 OR HIGHER: ICD-10-CM

## 2022-03-30 DIAGNOSIS — Z74.09 IMPAIRED MOBILITY AND ACTIVITIES OF DAILY LIVING: ICD-10-CM

## 2022-03-30 DIAGNOSIS — Z78.9 IMPAIRED MOBILITY AND ACTIVITIES OF DAILY LIVING: ICD-10-CM

## 2022-03-30 DIAGNOSIS — N18.4 CHRONIC KIDNEY DISEASE, STAGE IV (SEVERE): ICD-10-CM

## 2022-03-30 DIAGNOSIS — Z74.09 IMPAIRED FUNCTIONAL MOBILITY AND ACTIVITY TOLERANCE: ICD-10-CM

## 2022-03-30 DIAGNOSIS — J18.9 PNEUMONIA OF RIGHT LUNG DUE TO INFECTIOUS ORGANISM, UNSPECIFIED PART OF LUNG: ICD-10-CM

## 2022-03-30 DIAGNOSIS — R00.1 BRADYCARDIA: ICD-10-CM

## 2022-03-30 LAB
ALBUMIN SERPL-MCNC: 3.4 G/DL (ref 3.5–5.2)
ALBUMIN/GLOB SERPL: 0.9 G/DL
ALP SERPL-CCNC: 261 U/L (ref 39–117)
ALT SERPL W P-5'-P-CCNC: 17 U/L (ref 1–33)
ANION GAP SERPL CALCULATED.3IONS-SCNC: 17.8 MMOL/L (ref 5–15)
ANISOCYTOSIS BLD QL: NORMAL
AST SERPL-CCNC: 22 U/L (ref 1–32)
BACTERIA UR QL AUTO: ABNORMAL /HPF
BASOPHILS # BLD AUTO: 0.04 10*3/MM3 (ref 0–0.2)
BASOPHILS NFR BLD AUTO: 0.9 % (ref 0–1.5)
BILIRUB SERPL-MCNC: 0.7 MG/DL (ref 0–1.2)
BILIRUB UR QL STRIP: NEGATIVE
BUN SERPL-MCNC: 75 MG/DL (ref 8–23)
BUN/CREAT SERPL: 20.1 (ref 7–25)
CALCIUM SPEC-SCNC: 8 MG/DL (ref 8.6–10.5)
CHLORIDE SERPL-SCNC: 100 MMOL/L (ref 98–107)
CLARITY UR: CLEAR
CO2 SERPL-SCNC: 24.2 MMOL/L (ref 22–29)
COLOR UR: YELLOW
CREAT SERPL-MCNC: 3.74 MG/DL (ref 0.57–1)
D-LACTATE SERPL-SCNC: 1.8 MMOL/L (ref 0.5–2)
DEPRECATED RDW RBC AUTO: 56.7 FL (ref 37–54)
EGFRCR SERPLBLD CKD-EPI 2021: 13 ML/MIN/1.73
EOSINOPHIL # BLD AUTO: 0.04 10*3/MM3 (ref 0–0.4)
EOSINOPHIL NFR BLD AUTO: 0.9 % (ref 0.3–6.2)
ERYTHROCYTE [DISTWIDTH] IN BLOOD BY AUTOMATED COUNT: 20.7 % (ref 12.3–15.4)
GLOBULIN UR ELPH-MCNC: 3.7 GM/DL
GLUCOSE BLDC GLUCOMTR-MCNC: 112 MG/DL (ref 70–130)
GLUCOSE BLDC GLUCOMTR-MCNC: 64 MG/DL (ref 70–130)
GLUCOSE BLDC GLUCOMTR-MCNC: 66 MG/DL (ref 70–130)
GLUCOSE BLDC GLUCOMTR-MCNC: 88 MG/DL (ref 70–130)
GLUCOSE SERPL-MCNC: 40 MG/DL (ref 65–99)
GLUCOSE UR STRIP-MCNC: NEGATIVE MG/DL
HCT VFR BLD AUTO: 35.1 % (ref 34–46.6)
HGB BLD-MCNC: 10.8 G/DL (ref 12–15.9)
HGB UR QL STRIP.AUTO: ABNORMAL
HOLD SPECIMEN: NORMAL
HOLD SPECIMEN: NORMAL
HYALINE CASTS UR QL AUTO: ABNORMAL /LPF
IMM GRANULOCYTES # BLD AUTO: 0.06 10*3/MM3 (ref 0–0.05)
IMM GRANULOCYTES NFR BLD AUTO: 1.3 % (ref 0–0.5)
KETONES UR QL STRIP: NEGATIVE
LARGE PLATELETS: NORMAL
LEUKOCYTE ESTERASE UR QL STRIP.AUTO: NEGATIVE
LYMPHOCYTES # BLD AUTO: 0.56 10*3/MM3 (ref 0.7–3.1)
LYMPHOCYTES NFR BLD AUTO: 12.3 % (ref 19.6–45.3)
MACROCYTES BLD QL SMEAR: NORMAL
MAGNESIUM SERPL-MCNC: 2.5 MG/DL (ref 1.6–2.4)
MCH RBC QN AUTO: 23.7 PG (ref 26.6–33)
MCHC RBC AUTO-ENTMCNC: 30.8 G/DL (ref 31.5–35.7)
MCV RBC AUTO: 77 FL (ref 79–97)
MICROCYTES BLD QL: NORMAL
MONOCYTES # BLD AUTO: 0.31 10*3/MM3 (ref 0.1–0.9)
MONOCYTES NFR BLD AUTO: 6.8 % (ref 5–12)
NEUTROPHILS NFR BLD AUTO: 3.53 10*3/MM3 (ref 1.7–7)
NEUTROPHILS NFR BLD AUTO: 77.8 % (ref 42.7–76)
NITRITE UR QL STRIP: NEGATIVE
NRBC BLD AUTO-RTO: 1.3 /100 WBC (ref 0–0.2)
NT-PROBNP SERPL-MCNC: ABNORMAL PG/ML (ref 0–900)
PH UR STRIP.AUTO: <=5 [PH] (ref 5–8)
PLATELET # BLD AUTO: 106 10*3/MM3 (ref 140–450)
POTASSIUM SERPL-SCNC: 4.5 MMOL/L (ref 3.5–5.2)
PROCALCITONIN SERPL-MCNC: 0.2 NG/ML (ref 0–0.25)
PROT SERPL-MCNC: 7.1 G/DL (ref 6–8.5)
PROT UR QL STRIP: ABNORMAL
RBC # BLD AUTO: 4.56 10*6/MM3 (ref 3.77–5.28)
RBC # UR STRIP: ABNORMAL /HPF
REF LAB TEST METHOD: ABNORMAL
SARS-COV-2 RNA PNL SPEC NAA+PROBE: NOT DETECTED
SMALL PLATELETS BLD QL SMEAR: NORMAL
SODIUM SERPL-SCNC: 142 MMOL/L (ref 136–145)
SP GR UR STRIP: 1.01 (ref 1–1.03)
SQUAMOUS #/AREA URNS HPF: ABNORMAL /HPF
TROPONIN T SERPL-MCNC: 0.03 NG/ML (ref 0–0.03)
UROBILINOGEN UR QL STRIP: ABNORMAL
WBC # UR STRIP: ABNORMAL /HPF
WBC MORPH BLD: NORMAL
WBC NRBC COR # BLD: 4.54 10*3/MM3 (ref 3.4–10.8)
WHOLE BLOOD HOLD SPECIMEN: NORMAL
WHOLE BLOOD HOLD SPECIMEN: NORMAL

## 2022-03-30 PROCEDURE — 83735 ASSAY OF MAGNESIUM: CPT | Performed by: EMERGENCY MEDICINE

## 2022-03-30 PROCEDURE — 99284 EMERGENCY DEPT VISIT MOD MDM: CPT

## 2022-03-30 PROCEDURE — 93005 ELECTROCARDIOGRAM TRACING: CPT | Performed by: EMERGENCY MEDICINE

## 2022-03-30 PROCEDURE — G0378 HOSPITAL OBSERVATION PER HR: HCPCS

## 2022-03-30 PROCEDURE — 25010000002 MORPHINE SULFATE (PF) 2 MG/ML SOLUTION: Performed by: INTERNAL MEDICINE

## 2022-03-30 PROCEDURE — 81001 URINALYSIS AUTO W/SCOPE: CPT | Performed by: EMERGENCY MEDICINE

## 2022-03-30 PROCEDURE — 82962 GLUCOSE BLOOD TEST: CPT

## 2022-03-30 PROCEDURE — 84145 PROCALCITONIN (PCT): CPT | Performed by: PHYSICIAN ASSISTANT

## 2022-03-30 PROCEDURE — 71045 X-RAY EXAM CHEST 1 VIEW: CPT

## 2022-03-30 PROCEDURE — 99223 1ST HOSP IP/OBS HIGH 75: CPT | Performed by: INTERNAL MEDICINE

## 2022-03-30 PROCEDURE — 84484 ASSAY OF TROPONIN QUANT: CPT | Performed by: EMERGENCY MEDICINE

## 2022-03-30 PROCEDURE — 83605 ASSAY OF LACTIC ACID: CPT | Performed by: PHYSICIAN ASSISTANT

## 2022-03-30 PROCEDURE — 25010000002 CEFTRIAXONE SODIUM-DEXTROSE 1-3.74 GM-%(50ML) RECONSTITUTED SOLUTION: Performed by: PHYSICIAN ASSISTANT

## 2022-03-30 PROCEDURE — 87040 BLOOD CULTURE FOR BACTERIA: CPT | Performed by: PHYSICIAN ASSISTANT

## 2022-03-30 PROCEDURE — 80053 COMPREHEN METABOLIC PANEL: CPT | Performed by: EMERGENCY MEDICINE

## 2022-03-30 PROCEDURE — 25010000002 AZITHROMYCIN PER 500 MG: Performed by: INTERNAL MEDICINE

## 2022-03-30 PROCEDURE — 83880 ASSAY OF NATRIURETIC PEPTIDE: CPT | Performed by: PHYSICIAN ASSISTANT

## 2022-03-30 PROCEDURE — 85025 COMPLETE CBC W/AUTO DIFF WBC: CPT | Performed by: EMERGENCY MEDICINE

## 2022-03-30 PROCEDURE — 87635 SARS-COV-2 COVID-19 AMP PRB: CPT | Performed by: INTERNAL MEDICINE

## 2022-03-30 PROCEDURE — 85007 BL SMEAR W/DIFF WBC COUNT: CPT | Performed by: EMERGENCY MEDICINE

## 2022-03-30 RX ORDER — NICOTINE POLACRILEX 4 MG
1 LOZENGE BUCCAL
Status: DISCONTINUED | OUTPATIENT
Start: 2022-03-30 | End: 2022-04-14 | Stop reason: HOSPADM

## 2022-03-30 RX ORDER — PANTOPRAZOLE SODIUM 40 MG/1
40 TABLET, DELAYED RELEASE ORAL DAILY
Status: DISCONTINUED | OUTPATIENT
Start: 2022-03-30 | End: 2022-04-14 | Stop reason: HOSPADM

## 2022-03-30 RX ORDER — L.ACID,PARA/B.BIFIDUM/S.THERM 8B CELL
1 CAPSULE ORAL 2 TIMES DAILY
Status: DISCONTINUED | OUTPATIENT
Start: 2022-03-30 | End: 2022-04-14 | Stop reason: HOSPADM

## 2022-03-30 RX ORDER — ASPIRIN 81 MG/1
81 TABLET, CHEWABLE ORAL DAILY
Status: DISCONTINUED | OUTPATIENT
Start: 2022-03-31 | End: 2022-04-14 | Stop reason: HOSPADM

## 2022-03-30 RX ORDER — LEVOTHYROXINE SODIUM 0.15 MG/1
150 TABLET ORAL DAILY
Status: DISCONTINUED | OUTPATIENT
Start: 2022-03-30 | End: 2022-04-14 | Stop reason: HOSPADM

## 2022-03-30 RX ORDER — DEXTROSE MONOHYDRATE 25 G/50ML
50 INJECTION, SOLUTION INTRAVENOUS
Status: DISCONTINUED | OUTPATIENT
Start: 2022-03-30 | End: 2022-04-06

## 2022-03-30 RX ORDER — DEXTROSE MONOHYDRATE 25 G/50ML
25 INJECTION, SOLUTION INTRAVENOUS
Status: DISCONTINUED | OUTPATIENT
Start: 2022-03-30 | End: 2022-04-06

## 2022-03-30 RX ORDER — ONDANSETRON 2 MG/ML
4 INJECTION INTRAMUSCULAR; INTRAVENOUS EVERY 6 HOURS PRN
Status: DISCONTINUED | OUTPATIENT
Start: 2022-03-30 | End: 2022-04-14 | Stop reason: HOSPADM

## 2022-03-30 RX ORDER — CEFTRIAXONE 1 G/50ML
1 INJECTION, SOLUTION INTRAVENOUS ONCE
Status: COMPLETED | OUTPATIENT
Start: 2022-03-30 | End: 2022-03-30

## 2022-03-30 RX ORDER — ACETAMINOPHEN 650 MG/1
650 SUPPOSITORY RECTAL EVERY 4 HOURS PRN
Status: DISCONTINUED | OUTPATIENT
Start: 2022-03-30 | End: 2022-04-14 | Stop reason: HOSPADM

## 2022-03-30 RX ORDER — BUMETANIDE 1 MG/1
1 TABLET ORAL 2 TIMES DAILY
Status: DISCONTINUED | OUTPATIENT
Start: 2022-03-30 | End: 2022-03-31

## 2022-03-30 RX ORDER — ACETAMINOPHEN 160 MG/5ML
650 SOLUTION ORAL EVERY 4 HOURS PRN
Status: DISCONTINUED | OUTPATIENT
Start: 2022-03-30 | End: 2022-04-14 | Stop reason: HOSPADM

## 2022-03-30 RX ORDER — ACETAMINOPHEN 325 MG/1
650 TABLET ORAL EVERY 4 HOURS PRN
Status: DISCONTINUED | OUTPATIENT
Start: 2022-03-30 | End: 2022-04-14 | Stop reason: HOSPADM

## 2022-03-30 RX ORDER — LIDOCAINE 50 MG/G
1 PATCH TOPICAL EVERY 24 HOURS
Status: DISCONTINUED | OUTPATIENT
Start: 2022-03-30 | End: 2022-04-14 | Stop reason: HOSPADM

## 2022-03-30 RX ORDER — AZITHROMYCIN 250 MG/1
250 TABLET, FILM COATED ORAL
Status: COMPLETED | OUTPATIENT
Start: 2022-03-31 | End: 2022-04-03

## 2022-03-30 RX ORDER — DOCUSATE SODIUM 100 MG/1
100 CAPSULE, LIQUID FILLED ORAL 2 TIMES DAILY
Status: DISCONTINUED | OUTPATIENT
Start: 2022-03-30 | End: 2022-04-14 | Stop reason: HOSPADM

## 2022-03-30 RX ORDER — SODIUM CHLORIDE 0.9 % (FLUSH) 0.9 %
3-10 SYRINGE (ML) INJECTION AS NEEDED
Status: DISCONTINUED | OUTPATIENT
Start: 2022-03-30 | End: 2022-04-14 | Stop reason: HOSPADM

## 2022-03-30 RX ORDER — DEXTROSE MONOHYDRATE 25 G/50ML
50 INJECTION, SOLUTION INTRAVENOUS
Status: DISCONTINUED | OUTPATIENT
Start: 2022-03-30 | End: 2022-04-14 | Stop reason: HOSPADM

## 2022-03-30 RX ORDER — SODIUM CHLORIDE 0.9 % (FLUSH) 0.9 %
3 SYRINGE (ML) INJECTION EVERY 12 HOURS SCHEDULED
Status: DISCONTINUED | OUTPATIENT
Start: 2022-03-30 | End: 2022-04-14 | Stop reason: HOSPADM

## 2022-03-30 RX ORDER — FERROUS SULFATE TAB EC 324 MG (65 MG FE EQUIVALENT) 324 (65 FE) MG
324 TABLET DELAYED RESPONSE ORAL 2 TIMES DAILY WITH MEALS
Status: DISCONTINUED | OUTPATIENT
Start: 2022-03-30 | End: 2022-04-14 | Stop reason: HOSPADM

## 2022-03-30 RX ORDER — MORPHINE SULFATE 2 MG/ML
2 INJECTION, SOLUTION INTRAMUSCULAR; INTRAVENOUS ONCE
Status: COMPLETED | OUTPATIENT
Start: 2022-03-30 | End: 2022-03-30

## 2022-03-30 RX ORDER — CEFTRIAXONE 1 G/50ML
1 INJECTION, SOLUTION INTRAVENOUS EVERY 24 HOURS
Status: DISCONTINUED | OUTPATIENT
Start: 2022-03-31 | End: 2022-03-31

## 2022-03-30 RX ORDER — SODIUM CHLORIDE 0.9 % (FLUSH) 0.9 %
10 SYRINGE (ML) INJECTION AS NEEDED
Status: DISCONTINUED | OUTPATIENT
Start: 2022-03-30 | End: 2022-04-14 | Stop reason: HOSPADM

## 2022-03-30 RX ORDER — IPRATROPIUM BROMIDE AND ALBUTEROL SULFATE 2.5; .5 MG/3ML; MG/3ML
3 SOLUTION RESPIRATORY (INHALATION) EVERY 4 HOURS PRN
Status: DISCONTINUED | OUTPATIENT
Start: 2022-03-30 | End: 2022-04-14 | Stop reason: HOSPADM

## 2022-03-30 RX ORDER — GUAIFENESIN 600 MG/1
600 TABLET, EXTENDED RELEASE ORAL EVERY 12 HOURS SCHEDULED
Status: DISCONTINUED | OUTPATIENT
Start: 2022-03-30 | End: 2022-04-06

## 2022-03-30 RX ADMIN — Medication 1 CAPSULE: at 20:39

## 2022-03-30 RX ADMIN — GUAIFENESIN 600 MG: 600 TABLET, EXTENDED RELEASE ORAL at 20:39

## 2022-03-30 RX ADMIN — LIDOCAINE 1 PATCH: 50 PATCH CUTANEOUS at 20:00

## 2022-03-30 RX ADMIN — ACETAMINOPHEN 650 MG: 325 TABLET ORAL at 20:00

## 2022-03-30 RX ADMIN — BUMETANIDE 1 MG: 1 TABLET ORAL at 20:39

## 2022-03-30 RX ADMIN — DEXTROSE 50 % IN WATER (D50W) INTRAVENOUS SYRINGE 50 ML: at 21:59

## 2022-03-30 RX ADMIN — CEFTRIAXONE 1 G: 1 INJECTION, SOLUTION INTRAVENOUS at 18:20

## 2022-03-30 RX ADMIN — DOCUSATE SODIUM 100 MG: 100 CAPSULE, LIQUID FILLED ORAL at 20:39

## 2022-03-30 RX ADMIN — DEXTROSE 50 % IN WATER (D50W) INTRAVENOUS SYRINGE 50 ML: at 16:26

## 2022-03-30 RX ADMIN — Medication 50 ML: at 18:24

## 2022-03-30 RX ADMIN — SODIUM CHLORIDE 500 MG: 900 INJECTION, SOLUTION INTRAVENOUS at 22:08

## 2022-03-30 RX ADMIN — Medication 3 ML: at 20:39

## 2022-03-30 RX ADMIN — APIXABAN 2.5 MG: 2.5 TABLET, FILM COATED ORAL at 20:39

## 2022-03-30 RX ADMIN — MORPHINE SULFATE 2 MG: 2 INJECTION, SOLUTION INTRAMUSCULAR; INTRAVENOUS at 21:37

## 2022-03-30 NOTE — OUTREACH NOTE
"Medical Week 3 Survey    Flowsheet Row Responses   [facility] patient discharged from? [facility]   Does the patient have one of the following disease processes/diagnoses(primary or secondary)? Other   Week 3 attempt successful? Yes   Call start time 1333   Call end time 1357   Discharge diagnosis Acute metabolic encephalopathy    Person spoke with today (if not patient) and relationship spoke with patient and josesito Neal reviewed with patient/caregiver? Yes   Is the patient having any side effects they believe may be caused by any medication additions or changes? No   Does the patient have all medications ordered at discharge? Yes   Prescription comments Patient has completed antibiotics   Is the patient taking all medications as directed (includes completed medication regime)? No   What is preventing the patient from taking all medications as directed? Other  [Reviewed dosing of Bumex as patient experiencing issues with fluid retention,  last orders on AVS indicate increased to BID, patient has continued to take QD since discharge---education provided. ]   Nursing Interventions Nurse provided patient education   Comments regarding appointments Patient has not been compliant with Nephro or PCP appts--encouraged compliance with appts   Does the patient have a primary care provider?  Yes   Has the patient kept scheduled appointments due by today? No   What is preventing the patient from keeping their appointments? Doesn't understand importance   Nursing Interventions Advised to reschedule appointment   Did the patient receive a copy of their discharge instructions? Yes   Nursing interventions Reviewed instructions with patient   What is the patient's perception of their health status since discharge? Worsening  [Reports she is weak, cannot get out of bed and that she is returning to hospital. Her legs are edematous  \"dripping with water\" and abdomen is swollen, cannot weigh due to WC bound. She has not " been following orders for diuretics per AVS. ]   Is the patient/caregiver able to teach back signs and symptoms related to disease process for when to call PCP? Yes   Is the patient/caregiver able to teach back signs and symptoms related to disease process for when to call 911? Yes   Additional teach back comments This RN discussed fluid restriction listed on AVS of 1500ml, pt reports she only drinks one cup of water per day.  Inquired about her diet-she reports she doesn't eat much, eats pizza.,  discussed sodium in pizza.  Inquired about BG levels and patient is not checking and also not taking her insulin.  This RN called her niece Val who had already called 911 for transport to hospital--discussed concerns with her, she communicated that she assists patient and patient resistant to other living arrrangements.   Week 3 Call Completed? Yes          NATALIA HUNTER - Registered Nurse

## 2022-03-31 ENCOUNTER — APPOINTMENT (OUTPATIENT)
Dept: GENERAL RADIOLOGY | Facility: HOSPITAL | Age: 62
End: 2022-03-31

## 2022-03-31 ENCOUNTER — READMISSION MANAGEMENT (OUTPATIENT)
Dept: CALL CENTER | Facility: HOSPITAL | Age: 62
End: 2022-03-31

## 2022-03-31 LAB
ANION GAP SERPL CALCULATED.3IONS-SCNC: 18.2 MMOL/L (ref 5–15)
ANISOCYTOSIS BLD QL: NORMAL
BASOPHILS # BLD AUTO: 0.03 10*3/MM3 (ref 0–0.2)
BASOPHILS NFR BLD AUTO: 0.5 % (ref 0–1.5)
BUN SERPL-MCNC: 85 MG/DL (ref 8–23)
BUN/CREAT SERPL: 20.5 (ref 7–25)
CALCIUM SPEC-SCNC: 7.9 MG/DL (ref 8.6–10.5)
CHLORIDE SERPL-SCNC: 101 MMOL/L (ref 98–107)
CO2 SERPL-SCNC: 21.8 MMOL/L (ref 22–29)
CREAT SERPL-MCNC: 4.14 MG/DL (ref 0.57–1)
DEPRECATED RDW RBC AUTO: 54.6 FL (ref 37–54)
EGFRCR SERPLBLD CKD-EPI 2021: 11.5 ML/MIN/1.73
EOSINOPHIL # BLD AUTO: 0.02 10*3/MM3 (ref 0–0.4)
EOSINOPHIL NFR BLD AUTO: 0.4 % (ref 0.3–6.2)
ERYTHROCYTE [DISTWIDTH] IN BLOOD BY AUTOMATED COUNT: 20.5 % (ref 12.3–15.4)
GLUCOSE BLDC GLUCOMTR-MCNC: 108 MG/DL (ref 70–130)
GLUCOSE BLDC GLUCOMTR-MCNC: 67 MG/DL (ref 70–130)
GLUCOSE BLDC GLUCOMTR-MCNC: 72 MG/DL (ref 70–130)
GLUCOSE BLDC GLUCOMTR-MCNC: 76 MG/DL (ref 70–130)
GLUCOSE BLDC GLUCOMTR-MCNC: 87 MG/DL (ref 70–130)
GLUCOSE BLDC GLUCOMTR-MCNC: 89 MG/DL (ref 70–130)
GLUCOSE BLDC GLUCOMTR-MCNC: 92 MG/DL (ref 70–130)
GLUCOSE BLDC GLUCOMTR-MCNC: 95 MG/DL (ref 70–130)
GLUCOSE SERPL-MCNC: 83 MG/DL (ref 65–99)
HCT VFR BLD AUTO: 32.9 % (ref 34–46.6)
HGB BLD-MCNC: 10.3 G/DL (ref 12–15.9)
HYPOCHROMIA BLD QL: NORMAL
IMM GRANULOCYTES # BLD AUTO: 0.04 10*3/MM3 (ref 0–0.05)
IMM GRANULOCYTES NFR BLD AUTO: 0.7 % (ref 0–0.5)
INR PPP: 2.54 (ref 0.9–1.1)
LYMPHOCYTES # BLD AUTO: 0.39 10*3/MM3 (ref 0.7–3.1)
LYMPHOCYTES NFR BLD AUTO: 6.9 % (ref 19.6–45.3)
MCH RBC QN AUTO: 23.6 PG (ref 26.6–33)
MCHC RBC AUTO-ENTMCNC: 31.3 G/DL (ref 31.5–35.7)
MCV RBC AUTO: 75.3 FL (ref 79–97)
MICROCYTES BLD QL: NORMAL
MONOCYTES # BLD AUTO: 0.35 10*3/MM3 (ref 0.1–0.9)
MONOCYTES NFR BLD AUTO: 6.2 % (ref 5–12)
MRSA DNA SPEC QL NAA+PROBE: NORMAL
NEUTROPHILS NFR BLD AUTO: 4.84 10*3/MM3 (ref 1.7–7)
NEUTROPHILS NFR BLD AUTO: 85.3 % (ref 42.7–76)
NRBC BLD AUTO-RTO: 2.1 /100 WBC (ref 0–0.2)
OVALOCYTES BLD QL SMEAR: NORMAL
PLATELET # BLD AUTO: 107 10*3/MM3 (ref 140–450)
PMV BLD AUTO: ABNORMAL FL
POIKILOCYTOSIS BLD QL SMEAR: NORMAL
POTASSIUM SERPL-SCNC: 4.9 MMOL/L (ref 3.5–5.2)
PROTHROMBIN TIME: 28.3 SECONDS (ref 12.5–14.5)
RBC # BLD AUTO: 4.37 10*6/MM3 (ref 3.77–5.28)
SMALL PLATELETS BLD QL SMEAR: NORMAL
SODIUM SERPL-SCNC: 141 MMOL/L (ref 136–145)
WBC MORPH BLD: NORMAL
WBC NRBC COR # BLD: 5.67 10*3/MM3 (ref 3.4–10.8)

## 2022-03-31 PROCEDURE — 80048 BASIC METABOLIC PNL TOTAL CA: CPT | Performed by: INTERNAL MEDICINE

## 2022-03-31 PROCEDURE — 25010000002 ALBUMIN HUMAN 25% PER 50 ML: Performed by: INTERNAL MEDICINE

## 2022-03-31 PROCEDURE — 02HV33Z INSERTION OF INFUSION DEVICE INTO SUPERIOR VENA CAVA, PERCUTANEOUS APPROACH: ICD-10-PCS | Performed by: SURGERY

## 2022-03-31 PROCEDURE — 25010000002 FUROSEMIDE PER 20 MG: Performed by: INTERNAL MEDICINE

## 2022-03-31 PROCEDURE — 99233 SBSQ HOSP IP/OBS HIGH 50: CPT | Performed by: INTERNAL MEDICINE

## 2022-03-31 PROCEDURE — C1751 CATH, INF, PER/CENT/MIDLINE: HCPCS

## 2022-03-31 PROCEDURE — 25010000002 PIPERACILLIN SOD-TAZOBACTAM PER 1 G: Performed by: INTERNAL MEDICINE

## 2022-03-31 PROCEDURE — 85007 BL SMEAR W/DIFF WBC COUNT: CPT | Performed by: INTERNAL MEDICINE

## 2022-03-31 PROCEDURE — 87641 MR-STAPH DNA AMP PROBE: CPT | Performed by: INTERNAL MEDICINE

## 2022-03-31 PROCEDURE — 82962 GLUCOSE BLOOD TEST: CPT

## 2022-03-31 PROCEDURE — B548ZZA ULTRASONOGRAPHY OF SUPERIOR VENA CAVA, GUIDANCE: ICD-10-PCS | Performed by: SURGERY

## 2022-03-31 PROCEDURE — 76937 US GUIDE VASCULAR ACCESS: CPT | Performed by: SURGERY

## 2022-03-31 PROCEDURE — 25010000002 VANCOMYCIN 5 G RECONSTITUTED SOLUTION: Performed by: INTERNAL MEDICINE

## 2022-03-31 PROCEDURE — 36556 INSERT NON-TUNNEL CV CATH: CPT | Performed by: SURGERY

## 2022-03-31 PROCEDURE — 85610 PROTHROMBIN TIME: CPT | Performed by: INTERNAL MEDICINE

## 2022-03-31 PROCEDURE — 71045 X-RAY EXAM CHEST 1 VIEW: CPT

## 2022-03-31 PROCEDURE — G0378 HOSPITAL OBSERVATION PER HR: HCPCS

## 2022-03-31 PROCEDURE — P9047 ALBUMIN (HUMAN), 25%, 50ML: HCPCS | Performed by: INTERNAL MEDICINE

## 2022-03-31 PROCEDURE — 85025 COMPLETE CBC W/AUTO DIFF WBC: CPT | Performed by: INTERNAL MEDICINE

## 2022-03-31 RX ORDER — SODIUM CHLORIDE 0.9 % (FLUSH) 0.9 %
10 SYRINGE (ML) INJECTION EVERY 12 HOURS SCHEDULED
Status: DISCONTINUED | OUTPATIENT
Start: 2022-03-31 | End: 2022-04-14 | Stop reason: HOSPADM

## 2022-03-31 RX ORDER — ALBUMIN (HUMAN) 12.5 G/50ML
50 SOLUTION INTRAVENOUS ONCE
Status: COMPLETED | OUTPATIENT
Start: 2022-03-31 | End: 2022-03-31

## 2022-03-31 RX ORDER — ISOSORBIDE MONONITRATE 30 MG/1
30 TABLET, EXTENDED RELEASE ORAL DAILY
COMMUNITY
End: 2022-05-18 | Stop reason: HOSPADM

## 2022-03-31 RX ORDER — FUROSEMIDE 10 MG/ML
120 INJECTION INTRAMUSCULAR; INTRAVENOUS EVERY 6 HOURS
Status: DISCONTINUED | OUTPATIENT
Start: 2022-03-31 | End: 2022-03-31

## 2022-03-31 RX ORDER — METOLAZONE 5 MG/1
5 TABLET ORAL DAILY
COMMUNITY
End: 2022-04-14 | Stop reason: HOSPADM

## 2022-03-31 RX ORDER — SODIUM CHLORIDE 0.9 % (FLUSH) 0.9 %
20 SYRINGE (ML) INJECTION AS NEEDED
Status: DISCONTINUED | OUTPATIENT
Start: 2022-03-31 | End: 2022-04-14 | Stop reason: HOSPADM

## 2022-03-31 RX ORDER — MIDODRINE HYDROCHLORIDE 5 MG/1
10 TABLET ORAL
Status: DISCONTINUED | OUTPATIENT
Start: 2022-03-31 | End: 2022-04-01

## 2022-03-31 RX ORDER — LOPERAMIDE HYDROCHLORIDE 2 MG/1
2 CAPSULE ORAL 4 TIMES DAILY PRN
COMMUNITY
End: 2022-04-14 | Stop reason: HOSPADM

## 2022-03-31 RX ORDER — SODIUM CHLORIDE 0.9 % (FLUSH) 0.9 %
10 SYRINGE (ML) INJECTION AS NEEDED
Status: DISCONTINUED | OUTPATIENT
Start: 2022-03-31 | End: 2022-04-14 | Stop reason: HOSPADM

## 2022-03-31 RX ADMIN — ACETAMINOPHEN 650 MG: 325 TABLET ORAL at 06:29

## 2022-03-31 RX ADMIN — Medication 50 ML: at 01:03

## 2022-03-31 RX ADMIN — DOCUSATE SODIUM 100 MG: 100 CAPSULE, LIQUID FILLED ORAL at 09:21

## 2022-03-31 RX ADMIN — Medication 50 ML: at 06:30

## 2022-03-31 RX ADMIN — MIDODRINE HYDROCHLORIDE 10 MG: 5 TABLET ORAL at 09:21

## 2022-03-31 RX ADMIN — MIDODRINE HYDROCHLORIDE 10 MG: 5 TABLET ORAL at 11:59

## 2022-03-31 RX ADMIN — LEVOTHYROXINE SODIUM 150 MCG: 150 TABLET ORAL at 09:21

## 2022-03-31 RX ADMIN — TAZOBACTAM SODIUM AND PIPERACILLIN SODIUM 4.5 G: 500; 4 INJECTION, SOLUTION INTRAVENOUS at 20:18

## 2022-03-31 RX ADMIN — GUAIFENESIN 600 MG: 600 TABLET, EXTENDED RELEASE ORAL at 09:21

## 2022-03-31 RX ADMIN — Medication 3 ML: at 20:20

## 2022-03-31 RX ADMIN — PANTOPRAZOLE SODIUM 40 MG: 40 TABLET, DELAYED RELEASE ORAL at 09:22

## 2022-03-31 RX ADMIN — VANCOMYCIN HYDROCHLORIDE 2500 MG: 5 INJECTION, POWDER, LYOPHILIZED, FOR SOLUTION INTRAVENOUS at 15:58

## 2022-03-31 RX ADMIN — APIXABAN 2.5 MG: 2.5 TABLET, FILM COATED ORAL at 09:21

## 2022-03-31 RX ADMIN — Medication 1 CAPSULE: at 09:21

## 2022-03-31 RX ADMIN — FUROSEMIDE 120 MG: 10 INJECTION, SOLUTION INTRAMUSCULAR; INTRAVENOUS at 22:11

## 2022-03-31 RX ADMIN — Medication 1 CAPSULE: at 20:20

## 2022-03-31 RX ADMIN — APIXABAN 2.5 MG: 2.5 TABLET, FILM COATED ORAL at 20:20

## 2022-03-31 RX ADMIN — FUROSEMIDE 120 MG: 10 INJECTION, SOLUTION INTRAMUSCULAR; INTRAVENOUS at 11:21

## 2022-03-31 RX ADMIN — Medication 3 ML: at 09:22

## 2022-03-31 RX ADMIN — TAZOBACTAM SODIUM AND PIPERACILLIN SODIUM 4.5 G: 500; 4 INJECTION, SOLUTION INTRAVENOUS at 15:25

## 2022-03-31 RX ADMIN — FERROUS SULFATE TAB EC 324 MG (65 MG FE EQUIVALENT) 324 MG: 324 (65 FE) TABLET DELAYED RESPONSE at 09:21

## 2022-03-31 RX ADMIN — ALBUMIN HUMAN 50 G: 0.25 SOLUTION INTRAVENOUS at 13:56

## 2022-03-31 RX ADMIN — FERROUS SULFATE TAB EC 324 MG (65 MG FE EQUIVALENT) 324 MG: 324 (65 FE) TABLET DELAYED RESPONSE at 18:08

## 2022-03-31 RX ADMIN — GUAIFENESIN 600 MG: 600 TABLET, EXTENDED RELEASE ORAL at 20:20

## 2022-03-31 RX ADMIN — SILVER SULFADIAZINE 1 APPLICATION: 10 CREAM TOPICAL at 20:01

## 2022-03-31 RX ADMIN — AZITHROMYCIN MONOHYDRATE 250 MG: 250 TABLET ORAL at 09:21

## 2022-03-31 RX ADMIN — DOCUSATE SODIUM 100 MG: 100 CAPSULE, LIQUID FILLED ORAL at 20:20

## 2022-03-31 RX ADMIN — MIDODRINE HYDROCHLORIDE 10 MG: 5 TABLET ORAL at 18:08

## 2022-03-31 RX ADMIN — FUROSEMIDE 120 MG: 10 INJECTION, SOLUTION INTRAMUSCULAR; INTRAVENOUS at 18:58

## 2022-03-31 RX ADMIN — ASPIRIN 81 MG: 81 TABLET, CHEWABLE ORAL at 09:21

## 2022-03-31 NOTE — OUTREACH NOTE
Medical Week 4 Survey    Flowsheet Row Responses   [facility] patient discharged from? [facility]   Does the patient have one of the following disease processes/diagnoses(primary or secondary)? Other   Week 4 attempt successful? No   Revoke Readmitted          JULY SAXENA - Registered Nurse

## 2022-04-01 LAB
ALBUMIN SERPL-MCNC: 3.8 G/DL (ref 3.5–5.2)
ANION GAP SERPL CALCULATED.3IONS-SCNC: 21.6 MMOL/L (ref 5–15)
BUN SERPL-MCNC: 87 MG/DL (ref 8–23)
BUN/CREAT SERPL: 19.5 (ref 7–25)
CALCIUM SPEC-SCNC: 7.9 MG/DL (ref 8.6–10.5)
CHLORIDE SERPL-SCNC: 99 MMOL/L (ref 98–107)
CO2 SERPL-SCNC: 19.4 MMOL/L (ref 22–29)
CREAT SERPL-MCNC: 4.46 MG/DL (ref 0.57–1)
EGFRCR SERPLBLD CKD-EPI 2021: 10.5 ML/MIN/1.73
GLUCOSE BLDC GLUCOMTR-MCNC: 103 MG/DL (ref 70–130)
GLUCOSE BLDC GLUCOMTR-MCNC: 106 MG/DL (ref 70–130)
GLUCOSE BLDC GLUCOMTR-MCNC: 125 MG/DL (ref 70–130)
GLUCOSE SERPL-MCNC: 76 MG/DL (ref 65–99)
PHOSPHATE SERPL-MCNC: 6.7 MG/DL (ref 2.5–4.5)
POTASSIUM SERPL-SCNC: 5.4 MMOL/L (ref 3.5–5.2)
SODIUM SERPL-SCNC: 140 MMOL/L (ref 136–145)
VANCOMYCIN SERPL-MCNC: 20.6 MCG/ML (ref 5–40)

## 2022-04-01 PROCEDURE — 25010000002 PIPERACILLIN SOD-TAZOBACTAM PER 1 G

## 2022-04-01 PROCEDURE — 82962 GLUCOSE BLOOD TEST: CPT

## 2022-04-01 PROCEDURE — 80069 RENAL FUNCTION PANEL: CPT | Performed by: INTERNAL MEDICINE

## 2022-04-01 PROCEDURE — 99233 SBSQ HOSP IP/OBS HIGH 50: CPT | Performed by: INTERNAL MEDICINE

## 2022-04-01 PROCEDURE — 25010000002 FUROSEMIDE PER 20 MG: Performed by: INTERNAL MEDICINE

## 2022-04-01 PROCEDURE — 25010000002 PIPERACILLIN SOD-TAZOBACTAM PER 1 G: Performed by: INTERNAL MEDICINE

## 2022-04-01 PROCEDURE — 80202 ASSAY OF VANCOMYCIN: CPT | Performed by: INTERNAL MEDICINE

## 2022-04-01 RX ORDER — ISOSORBIDE MONONITRATE 30 MG/1
30 TABLET, EXTENDED RELEASE ORAL DAILY
Status: DISCONTINUED | OUTPATIENT
Start: 2022-04-01 | End: 2022-04-14 | Stop reason: HOSPADM

## 2022-04-01 RX ORDER — SODIUM POLYSTYRENE SULFONATE 15 G/60ML
30 SUSPENSION ORAL; RECTAL ONCE
Status: COMPLETED | OUTPATIENT
Start: 2022-04-01 | End: 2022-04-01

## 2022-04-01 RX ORDER — SODIUM POLYSTYRENE SULFONATE 15 G/60ML
SUSPENSION ORAL; RECTAL
Status: DISPENSED
Start: 2022-04-01 | End: 2022-04-01

## 2022-04-01 RX ORDER — METOPROLOL SUCCINATE 50 MG/1
50 TABLET, EXTENDED RELEASE ORAL
Status: DISCONTINUED | OUTPATIENT
Start: 2022-04-01 | End: 2022-04-02

## 2022-04-01 RX ORDER — HYDRALAZINE HYDROCHLORIDE 25 MG/1
25 TABLET, FILM COATED ORAL EVERY 8 HOURS SCHEDULED
Status: DISCONTINUED | OUTPATIENT
Start: 2022-04-01 | End: 2022-04-06

## 2022-04-01 RX ORDER — MIDODRINE HYDROCHLORIDE 5 MG/1
5 TABLET ORAL
Status: DISCONTINUED | OUTPATIENT
Start: 2022-04-01 | End: 2022-04-01

## 2022-04-01 RX ADMIN — LIDOCAINE 1 PATCH: 50 PATCH CUTANEOUS at 21:40

## 2022-04-01 RX ADMIN — FUROSEMIDE 120 MG: 10 INJECTION, SOLUTION INTRAMUSCULAR; INTRAVENOUS at 10:09

## 2022-04-01 RX ADMIN — HYDRALAZINE HYDROCHLORIDE 25 MG: 25 TABLET ORAL at 20:00

## 2022-04-01 RX ADMIN — SODIUM POLYSTYRENE SULFONATE 30 G: 15 SUSPENSION ORAL; RECTAL at 10:09

## 2022-04-01 RX ADMIN — APIXABAN 2.5 MG: 2.5 TABLET, FILM COATED ORAL at 08:00

## 2022-04-01 RX ADMIN — FERROUS SULFATE TAB EC 324 MG (65 MG FE EQUIVALENT) 324 MG: 324 (65 FE) TABLET DELAYED RESPONSE at 18:14

## 2022-04-01 RX ADMIN — PANTOPRAZOLE SODIUM 40 MG: 40 TABLET, DELAYED RELEASE ORAL at 08:00

## 2022-04-01 RX ADMIN — Medication 10 ML: at 01:06

## 2022-04-01 RX ADMIN — DOCUSATE SODIUM 100 MG: 100 CAPSULE, LIQUID FILLED ORAL at 20:00

## 2022-04-01 RX ADMIN — AZITHROMYCIN MONOHYDRATE 250 MG: 250 TABLET ORAL at 08:00

## 2022-04-01 RX ADMIN — TAZOBACTAM SODIUM AND PIPERACILLIN SODIUM 4.5 G: 500; 4 INJECTION, SOLUTION INTRAVENOUS at 17:10

## 2022-04-01 RX ADMIN — SILVER SULFADIAZINE 1 APPLICATION: 10 CREAM TOPICAL at 08:02

## 2022-04-01 RX ADMIN — FERROUS SULFATE TAB EC 324 MG (65 MG FE EQUIVALENT) 324 MG: 324 (65 FE) TABLET DELAYED RESPONSE at 08:00

## 2022-04-01 RX ADMIN — FUROSEMIDE 120 MG: 10 INJECTION, SOLUTION INTRAMUSCULAR; INTRAVENOUS at 03:55

## 2022-04-01 RX ADMIN — GUAIFENESIN 600 MG: 600 TABLET, EXTENDED RELEASE ORAL at 08:00

## 2022-04-01 RX ADMIN — DOCUSATE SODIUM 100 MG: 100 CAPSULE, LIQUID FILLED ORAL at 08:00

## 2022-04-01 RX ADMIN — Medication 10 ML: at 21:40

## 2022-04-01 RX ADMIN — ASPIRIN 81 MG: 81 TABLET, CHEWABLE ORAL at 08:00

## 2022-04-01 RX ADMIN — Medication 10 ML: at 08:00

## 2022-04-01 RX ADMIN — ISOSORBIDE MONONITRATE 30 MG: 30 TABLET, EXTENDED RELEASE ORAL at 10:09

## 2022-04-01 RX ADMIN — METOPROLOL SUCCINATE 50 MG: 50 TABLET, EXTENDED RELEASE ORAL at 10:09

## 2022-04-01 RX ADMIN — GUAIFENESIN 600 MG: 600 TABLET, EXTENDED RELEASE ORAL at 20:00

## 2022-04-01 RX ADMIN — SILVER SULFADIAZINE 1 APPLICATION: 10 CREAM TOPICAL at 21:40

## 2022-04-01 RX ADMIN — LEVOTHYROXINE SODIUM 150 MCG: 150 TABLET ORAL at 08:00

## 2022-04-01 RX ADMIN — Medication 1 CAPSULE: at 20:00

## 2022-04-01 RX ADMIN — FUROSEMIDE 120 MG: 10 INJECTION, SOLUTION INTRAMUSCULAR; INTRAVENOUS at 16:51

## 2022-04-01 RX ADMIN — Medication 3 ML: at 08:02

## 2022-04-01 RX ADMIN — TAZOBACTAM SODIUM AND PIPERACILLIN SODIUM 4.5 G: 500; 4 INJECTION, SOLUTION INTRAVENOUS at 03:55

## 2022-04-01 RX ADMIN — Medication 10 ML: at 08:01

## 2022-04-01 RX ADMIN — HYDRALAZINE HYDROCHLORIDE 25 MG: 25 TABLET ORAL at 16:52

## 2022-04-01 RX ADMIN — Medication 3 ML: at 21:40

## 2022-04-01 RX ADMIN — Medication 1 CAPSULE: at 08:00

## 2022-04-01 RX ADMIN — APIXABAN 2.5 MG: 2.5 TABLET, FILM COATED ORAL at 20:00

## 2022-04-01 RX ADMIN — FUROSEMIDE 120 MG: 10 INJECTION, SOLUTION INTRAMUSCULAR; INTRAVENOUS at 21:45

## 2022-04-01 NOTE — PLAN OF CARE
Goal Outcome Evaluation: pt. Sleeping most of the day but will answer appropriately when addressed and follow commands. Has refused meals but has had some oral liq. Intake. Pt. Has stated wanting to go home several times and is refusing dialysis. VSS  and will continue to follow. Wound care dressings changed and monitored. Niece updated and will follow up this evening.

## 2022-04-01 NOTE — CONSULTS
"1350:    This therapist received call from [facility] staff (JANAE Del Toro; Dr. Reyes) for a behavioral health consult. Met with patient at bedside. Patient presented to the ED on 3/30/22 for generalized weakness. Patient is chronically ill with atrial fibrillation, heart failure, kidney disease stage IV, wheelchair bound and poor ADLs.  Patient has hx of residence at nursing facility but current living at independent residence.  Patient was found at home in poor conditions and with poor hygiene.  Patient does serve as her own guardian but does have a POA (Val Cornell). Therapist attempted contact with the POA, left message asking for return call.  Per Dolores FIGUEREDO MSW, POA and family are cleaning the home and plan to be engaged in patient care post discharge from the hospital.  Patient is alert and oriented to self, location, date of birth and situation of being in the hospital. Patient able to identify her POA by name.  Patient states she is frustrated being in the hopsital and wants to discharge home.  She voices she is not interested in pursuing dialysis.  She denies any consistent mental health tx hx.  Patient mood is dysphoric, flat and lethargic. I met with the bedside in attempt to offer behavioral health consultation for depression and counseling support.  Patient tells me she does not wish for a consultation.  She reports she is \"frustrated\" at people that she just wants \"to go home.\"  Patient denies hallucinations or delusions.  Perceptual disturbances were not observed during the assessment.  Patient denies SI/HI/AVH and acute indicatiors. At this time, the patient is not agreeable to full behavioral health assessment or consultation. The patient does not present with criteria to warrant an involuntary petition or psychiatric hold at this time as she is not actively suicidal, homicidal, or displaying symptoms of an acute psychotic episode. Therapist offered resources for outpatient mental health providers and " support in the area, patient declined.  Therapist also discussed the availability of emergency behavioral health services 24/7 at through the [facility] and [facility].  Therapist assisted the patient in identifying risk factors that would indicate the need for higher level of care, such as acute withdrawal symptoms, thoughts to harm self or others and/or self-harming behavior(s). Encouraged patient to call 911, crisis hotlines, or present to the nearest emergency department should symptoms worsen, or in any crisis/emergency. The patient voiced understanding.  I updated the treatment team (JANAE Del Toro) and left VM for Dr. Reyes and POA.  I circled back with HOLLY Colon re: ongoing support. I encourage the patient and family to work with , Case Mgmt and the treatment team for support post hopsitalization.  Patient and POA could benefit from TCM, home health and other support services if patient elects to return to residence vs skilled nursing facility.    [place]-SUICIDE SEVERITY RATING SCALE  Psychiatric Inpatient Setting - Discharge Screener    Ask questions that are bold and underlined Discharge   Ask Questions 1 and 2 YES NO   1) Wish to be Dead:   Person endorses thoughts about a wish to be dead or not alive anymore, or wish to fall asleep and not wake up.  While you were here in the hospital, have you wished you were dead or wished you could go to sleep and not wake up?  No   2) Suicidal Thoughts:   General non-specific thoughts of wanting to end one's life/die by suicide, “I've thought about killing myself” without general thoughts of ways to kill oneself/associated methods, intent, or plan.   While you were here in the hospital, have you actually had thoughts about killing yourself?   No   If YES to 2, ask questions 3, 4, 5, and 6.  If NO to 2, go directly to question 6   3) Suicidal Thoughts with Method (without Specific Plan or Intent to Act):   Person  endorses thoughts of suicide and has thought of a least one method during the assessment period. This is different than a specific plan with time, place or method details worked out. “I thought about taking an overdose but I never made a specific plan as to when where or how I would actually do it….and I would never go through with it.”   Have you been thinking about how you might kill yourself?   No   4) Suicidal Intent (without Specific Plan):   Active suicidal thoughts of killing oneself and patient reports having some intent to act on such thoughts, as opposed to “I have the thoughts but I definitely will not do anything about them.”   Have you had these thoughts and had some intention of acting on them or do you have some intention of acting on them after you leave the hospital?   No   5) Suicide Intent with Specific Plan:   Thoughts of killing oneself with details of plan fully or partially worked out and person has some intent to carry it out.   Have you started to work out or worked out the details of how to kill yourself either for while you were here in the hospital or for after you leave the hospital? Do you intend to carry out this plan?   No     6) Suicide Behavior    While you were here in the hospital, have you done anything, started to do anything, or prepared to do anything to end your life?    Examples: Took pills, cut yourself, tried to hang yourself, took out pills but didn't swallow any because you changed your mind or someone took them from you, collected pills, secured a means of obtaining a gun, gave away valuables, wrote a will or suicide note, etc.  No     -Donna Johnston, [facility]

## 2022-04-01 NOTE — THERAPY EVALUATION
Patient declines working with therapy today, she is not feeling well enough to participate.  PT will follow up tomorrow.

## 2022-04-01 NOTE — PLAN OF CARE
"Pt presents to the hospital via EMS with c/o generalized weakness.    Admit dx: Generalized weakness; Acute hypoglycemia; Severe bradycardia; Progressing worsening CKD    PMH:   A. Fib; CHF; CKD, stage IV; Chronic Corpulmonale    Code status:  DNR/DNI    Pt has Power of  paperwork on EMR, but appears to be financial.  Niece, Val Cornell, is named as POA for finances.      Visited pt in her room.  She was resting in bed with her eyes closed.  She appeared to be comfortable.  After calling her name a couple of times, she started saying \"I want to go home\" three times.  When she was asked if she lived alone, I received the same response.  Pt stated that she did not live alone.  She claimed that her brother, Adolfo, lived with her.  Pt verbalized okay to speak with her niece, Val.    Spoke to Val via phone call.  Update provided regarding pt's response.  Val shared in the past 6 years the patient has lost several family members.  Over several years, she lost her twin sister, another sister, brother, nephew and cousins.  Val feels the pt is suffering from depression as they have a strong family history of depression.  Val reported that pt's brother does not live with the patient, but is at her house 24 hrs a day.  He feels bad that the pt is sick and has agreed to take over making sure the pt is taking her medication, per Val.      Options for palliative services, HH and MK waiver programs discussed in detail. Val reported that the pt does not want to go to a nursing home because of her sister, however, she was hoping she may consider [facility] swing bed.  However, Val is willing to get the patient home, but wants the pt to know she has to accept HH.  A Palliative services explained in detail as an extra resource while pt is receiving other treatment.  Val is in agreement for palliative referral to be initiated.  Val said that she is POA, but is not able to make " "the pt go anywhere because she was still making her own decisions.  Val informed that it appeared she was only POA of financial issues.  She said that she has not had any problems in the past.  She was informed that the pt gave permission to speak with her.  She was encouraged to speak with the pt re her wishes for future care and have the pt complete a living will naming her as the HC surrogate.  She said she has tried in the past but the patient said she didn't need it.      Val reports that pt will need an EMS for transport home.  If [facility] is [facility], they cannot use \"[facility]\" as the family has a pending lawsuit for another family member. Val plans to visit the patient tomorrow and will discuss discharge plans with her. Palliative services will continue to follow.                                 "

## 2022-04-01 NOTE — PROGRESS NOTES
Pharmacokinetic Consult - Piperacillin-tazobactam Dosing  Millicent Mckeon is a 61 y.o. female who has been consulted to dose piperacillin-tazobactam for  sepsis secondary to PNA .    Current Antimicrobial Therapy    Anti-Infectives (From admission, onward)      Ordered     Dose/Rate Route Frequency Start Stop    04/01/22 0732  piperacillin-tazobactam (ZOSYN) 4.5 g in iso-osmotic dextrose 100 mL IVPB (premix)        Ordering Provider: Charles Laird RPH    4.5 g  over 4 Hours Intravenous Every 12 Hours 04/01/22 1555 04/05/22 1559    03/31/22 1307  vancomycin 2500 mg/500 mL 0.9% NS IVPB ([facility])        Ordering Provider: Samson Reyes MD    2,500 mg  over 2.5 Hours Intravenous Once 03/31/22 1415 03/31/22 1830    03/31/22 1311  Vancomycin Pharmacy Intermittent Dosing        Ordering Provider: Samson Reyes MD     Does not apply Daily 03/31/22 1400 04/04/22 0859    03/31/22 1300  piperacillin-tazobactam (ZOSYN) 4.5 g in iso-osmotic dextrose 100 mL IVPB (premix)        Ordering Provider: Samson Reyes MD    4.5 g  over 30 Minutes Intravenous Once 03/31/22 1345 03/31/22 1555    03/31/22 1256  Pharmacy to dose vancomycin        Ordering Provider: Samson Reyes MD     Does not apply Continuous PRN 03/31/22 1255 04/03/22 1254    03/31/22 1256  Pharmacy to Dose Zosyn        Ordering Provider: Samson Reyes MD     Does not apply Continuous PRN 03/31/22 1255 04/05/22 1254    03/30/22 1915  azithromycin (ZITHROMAX) tablet 250 mg        Ordering Provider: Samson Reeys MD    250 mg Oral Every 24 Hours Scheduled 03/31/22 0900 04/04/22 0859    03/30/22 1728  azithromycin (ZITHROMAX) 500 mg 0.9% NaCl (Add-vantage) 250 mL        Ordering Provider: Samson Reyes MD    500 mg  over 60 Minutes Intravenous Once 03/30/22 1800 03/31/22 0009    03/30/22 1728  cefTRIAXone (ROCEPHIN) IVPB 1 g/50ml dextrose (premix)        Ordering Provider: Jerry Vargas PA-C    1 g  100 mL/hr over 30 Minutes Intravenous Once 03/30/22 3738 03/30/22  2040            Allergies  Metformin and related    Relevant clinical data and objective history reviewed:  Creatinine   Date Value Ref Range Status   04/01/2022 4.46 (H) 0.57 - 1.00 mg/dL Final     Estimated Creatinine Clearance: 19.6 mL/min (A) (by C-G formula based on SCr of 4.46 mg/dL (H)).  I/O last 3 completed shifts:  In: 1105 [P.O.:300; I.V.:655; IV Piggyback:150]  Out: 500 [Urine:500]  Patient weight: (!) 152 kg (335 lb 9.6 oz)    Asessment/Plan  Due to patient's worsening renal function, will change frequency to piperacillin-tazobactam 4.5g IV every 12 hours.  Pharmacy will monitor Ms. Mckeon's renal function and clinical status and adjust the piperacillin-tazobactam dose and/or frequency as needed.    Thanks,     Charles Laird, PharmD  4/1/2022 07:34 EDT

## 2022-04-01 NOTE — PROGRESS NOTES
[facility] Progress Note  [facility]. [place]        Patient Name: Millicent Mckeon  : 1960  MRN: [id number]   LOS: 0 days    Patient Care Team:  Marianela Albright APRN as PCP - General (Family Medicine)  Geremias Shepherd MD as Consulting Physician (General Surgery)  iLsa Cardoso MD as Surgeon (General Surgery)    Chief Complaint:    Chief Complaint   Patient presents with   • Weakness - Generalized     Unable to stand     Primary Care Physician:  Marianela Albright APRN  Date of admission: 3/30/2022    Subjective     Interval History:   Events noted from last 24 hours.  Patient was transferred to ICU currently appears to be doing better lot more alert and interactive.  Today she again said that she does not want dialysis.  I called her POA Val and she said she will stop by and talk to her.  She said if she needs dialysis she will have to do it.  Although it does not appear that she needs it today but most likely will end up requiring dialysis this admission.  I will let them discussed this again during the day and see how she will respond over the next 2 days and make the final decision depending on what they decide and how her blood work looks.  She denies having any chest pain or shortness of breath.  No fever.    Review of Systems:   As noted above    Objective     Vitals:   Temp:  [94.3 °F (34.6 °C)-97.9 °F (36.6 °C)] 97.9 °F (36.6 °C)  Heart Rate:  [] 97  Resp:  [19-22] 22  BP: (101-158)/() 158/86  Flow (L/min):  [2] 2    Intake/Output Summary (Last 24 hours) at 2022 0748  Last data filed at 2022 0412  Gross per 24 hour   Intake 405 ml   Output 450 ml   Net -45 ml       Physical Exam:    General Appearance: alert,  no acute distress   Skin: warm and dry  HEENT: oral mucosa normal, nonicteric sclera  Neck: supple, no JVD  Lungs: CTA  Heart: RRR, normal S1 and S2  Abdomen: Obese, soft to firm, nontender, nondistended  : no palpable  bladder  Extremities: 2-3+ edema, mostly it is dependent edema, no cyanosis or clubbing  Neuro: He is able to communicate and randomly moves extremities.    Scheduled Meds:     Current Facility-Administered Medications   Medication Dose Route Frequency Provider Last Rate Last Admin   • acetaminophen (TYLENOL) tablet 650 mg  650 mg Oral Q4H PRN Smason Reyes MD   650 mg at 03/31/22 0629    Or   • acetaminophen (TYLENOL) 160 MG/5ML solution 650 mg  650 mg Oral Q4H PRN Samson Reyes MD        Or   • acetaminophen (TYLENOL) suppository 650 mg  650 mg Rectal Q4H PRN Samson Reyes MD       • apixaban (ELIQUIS) tablet 2.5 mg  2.5 mg Oral Q12H Samson Reyes MD   2.5 mg at 03/31/22 2020   • aspirin chewable tablet 81 mg  81 mg Oral Daily Samson Reyes MD   81 mg at 03/31/22 0921   • azithromycin (ZITHROMAX) tablet 250 mg  250 mg Oral Q24H Samson Reyes MD   250 mg at 03/31/22 0921   • dextrose (D50W) (25 g/50 mL) IV injection 25 g  25 g Intravenous Q15 Min PRN Samson Reyes MD       • dextrose (D50W) (25 g/50 mL) IV injection 50 mL  50 mL Intravenous Q1H PRN Samson Reyes MD   50 mL at 03/30/22 2159   • dextrose (D50W) (25 g/50 mL) IV injection 50 mL  50 mL Intravenous Q1H PRN Jerry Vargas PA-C   50 mL at 03/31/22 0630   • dextrose (GLUTOSE) oral gel 1 tube  1 tube Oral Q15 Min PRN Samson Reyes MD       • docusate sodium (COLACE) capsule 100 mg  100 mg Oral BID Samson Reyes MD   100 mg at 03/31/22 2020   • ferrous sulfate EC tablet 324 mg  324 mg Oral BID With Meals Samson Reyes MD   324 mg at 03/31/22 1808   • furosemide (LASIX) 120 mg in sodium chloride 0.9 % IVPB  120 mg Intravenous Q6H Milad Leone MD   120 mg at 04/01/22 0355   • glucagon (human recombinant) (GLUCAGEN DIAGNOSTIC) injection 1 mg  1 mg Subcutaneous PRN Samson Reyes MD       • guaiFENesin (MUCINEX) 12 hr tablet 600 mg  600 mg Oral Q12H Samson Reyes MD   600 mg at 03/31/22 2020   • ipratropium-albuterol (DUO-NEB) nebulizer solution 3  mL  3 mL Nebulization Q4H PRN Samson Reyes MD       • lactobacillus acidophilus (RISAQUAD) capsule 1 capsule  1 capsule Oral BID Samson Reyes MD   1 capsule at 03/31/22 2020   • levothyroxine (SYNTHROID, LEVOTHROID) tablet 150 mcg  150 mcg Oral Daily Samson Reyes MD   150 mcg at 03/31/22 0921   • lidocaine (LIDODERM) 5 % 1 patch  1 patch Transdermal Q24H Samson Reyes MD   1 patch at 03/30/22 2000   • midodrine (PROAMATINE) tablet 5 mg  5 mg Oral TID AC Samson Reyes MD       • ondansetron (ZOFRAN) injection 4 mg  4 mg Intravenous Q6H PRN Samson Reyes MD       • pantoprazole (PROTONIX) EC tablet 40 mg  40 mg Oral Daily Samson Reyes MD   40 mg at 03/31/22 0922   • Pharmacy to Dose Zosyn   Does not apply Continuous PRN Samson Reyes MD       • piperacillin-tazobactam (ZOSYN) 4.5 g in iso-osmotic dextrose 100 mL IVPB (premix)  4.5 g Intravenous Q12H [facility]       • silver sulfadiazine (SILVADENE, SSD) 1 % cream 1 application  1 application Topical Q12H Samson Reyes MD   1 application at 03/31/22 2001   • sodium chloride 0.9 % flush 10 mL  10 mL Intravenous PRN Samson Reyes MD       • sodium chloride 0.9 % flush 10 mL  10 mL Intravenous Q12H Samson Reyes MD   10 mL at 04/01/22 0106   • sodium chloride 0.9 % flush 10 mL  10 mL Intravenous Q12H Samson Reyes MD   10 mL at 04/01/22 0106   • sodium chloride 0.9 % flush 10 mL  10 mL Intravenous Q12H Samson Reyes MD   10 mL at 04/01/22 0106   • sodium chloride 0.9 % flush 10 mL  10 mL Intravenous PRN Samson Reyes MD       • sodium chloride 0.9 % flush 20 mL  20 mL Intravenous PRN Samson Reyes MD       • sodium chloride 0.9 % flush 3 mL  3 mL Intravenous Q12H Samson Reyes MD   3 mL at 03/31/22 2020   • sodium chloride 0.9 % flush 3-10 mL  3-10 mL Intravenous PRN Samson Reyes MD           apixaban, 2.5 mg, Oral, Q12H  aspirin, 81 mg, Oral, Daily  azithromycin, 250 mg, Oral, Q24H  docusate sodium, 100 mg, Oral, BID  ferrous sulfate, 324 mg, Oral, BID With  Meals  furosemide (LASIX) IVPB, 120 mg, Intravenous, Q6H  guaiFENesin, 600 mg, Oral, Q12H  lactobacillus acidophilus, 1 capsule, Oral, BID  levothyroxine, 150 mcg, Oral, Daily  lidocaine, 1 patch, Transdermal, Q24H  midodrine, 5 mg, Oral, TID AC  pantoprazole, 40 mg, Oral, Daily  piperacillin-tazobactam, 4.5 g, Intravenous, Q12H  silver sulfadiazine, 1 application, Topical, Q12H  sodium chloride, 10 mL, Intravenous, Q12H  sodium chloride, 10 mL, Intravenous, Q12H  sodium chloride, 10 mL, Intravenous, Q12H  sodium chloride, 3 mL, Intravenous, Q12H        IV Meds:   Pharmacy to Dose Zosyn,         Results Reviewed:   I have personally reviewed the results from the time of this admission to 4/1/2022 07:48 EDT     Results from last 7 days   Lab Units 04/01/22 0421 03/31/22  0541 03/30/22  1534   SODIUM mmol/L 140 141 142   POTASSIUM mmol/L 5.4* 4.9 4.5   CHLORIDE mmol/L 99 101 100   CO2 mmol/L 19.4* 21.8* 24.2   BUN mg/dL 87* 85* 75*   CREATININE mg/dL 4.46* 4.14* 3.74*   CALCIUM mg/dL 7.9* 7.9* 8.0*   BILIRUBIN mg/dL  --   --  0.7   ALK PHOS U/L  --   --  261*   ALT (SGPT) U/L  --   --  17   AST (SGOT) U/L  --   --  22   GLUCOSE mg/dL 76 83 40*       Estimated Creatinine Clearance: 19.6 mL/min (A) (by C-G formula based on SCr of 4.46 mg/dL (H)).    Results from last 7 days   Lab Units 04/01/22 0421 03/30/22  1534   MAGNESIUM mg/dL  --  2.5*   PHOSPHORUS mg/dL 6.7*  --              Results from last 7 days   Lab Units 03/31/22  0541 03/30/22  1534   WBC 10*3/mm3 5.67 4.54   HEMOGLOBIN g/dL 10.3* 10.8*   PLATELETS 10*3/mm3 107* 106*       Results from last 7 days   Lab Units 03/31/22  1343   INR  2.54*       Brief Urine Lab Results  (Last result in the past 365 days)      Color   Clarity   Blood   Leuk Est   Nitrite   Protein   CREAT   Urine HCG        03/30/22 1705 Yellow   Clear   Trace   Negative   Negative   >=300 mg/dL (3+)                 No results found for: UTPCR    Imaging Results (Last 24 Hours)      Procedure Component Value Units Date/Time    XR Chest 1 View [[id number]] Collected: 03/31/22 1813     Updated: 03/31/22 1816    Narrative:      FINAL REPORT    CLINICAL HISTORY:  central line placement verification    FINDINGS:  COMPARISON: None   FINDINGS: The heart size is enlarged.  There  is a left internal jugular line with the tip in the region of  the distal SVC.  Patient is rotated to the right.  Multiple  overlying EKG leads noted.  There is pulmonary vascular  congestion.  No definite pleural effusions seen..  The  mediastinum is within normal limits.  There are mild, bilateral  perihilar infiltrates suggesting mild CHF.  There is no pleural  effusion.  There is no pneumothorax.  The bony thorax is intact.      Impression:      Left internal jugular catheter with tip in the region of the  distal SVC; no pneumothorax identified.  Evidence of mild  congestive heart failure.    Authenticated by Cookie Abarca MD on 03/31/2022 06:13:47 PM    XR Chest 1 View [[id number]] Collected: 03/31/22 0757     Updated: 03/31/22 0811    Narrative:      PORTABLE CHEST  3/30/2022 4:40 PM     HISTORY: AMS     COMPARISON:  March 4, 2022     FINDINGS: The cardiac silhouette is enlarged with severe right atrial  enlargement. Pulmonary vascular congestion.  No focal lung  consolidation. There is no pneumothorax. The visualized osseous  structures demonstrate no acute abnormalities.       Impression:      Cardiomegaly with central pulmonary vascular congestion.                 This report was signed and finalized on 3/31/2022 7:58 AM by Twila Field MD.              Assessment / Plan     ASSESSMENT:    Acute kidney injury: It is quite likely that she has worsening renal function secondary to her baseline worsening of renal function, it is quite possible that she has not been taking her medications in has worsening cardiorenal syndrome.  I will go ahead and optimize the medications and see if we can get some improvement in her  renal function.  If no improvement her only option will be to start renal replacement therapy or go to in center hospice for comfort care only.    Chronic kidney disease, stage IV (severe) ([facility]): Baseline diabetic and hypertensive nephropathy with likely combination of cardiorenal syndrome.    Anemia due to stage 4 chronic kidney disease (HCC): Hemoglobin appears to be fairly stable.    Chronic diastolic congestive heart failure (HCC): We will try to diurese with high-dose diuretics and see if she will respond to it.    Cor pulmonale, chronic (HCC): Longstanding history of untreated sleep apnea might be contributing.    Hypoglycemia: Oral hypoglycemic agents with prolonged effect from renal failure likely the cause.    Bilateral edema of lower extremity: She does not have much lower extremity edema most of the edema is in the abdominal and chest area appears to be having generalized anasarca.    Essential hypertension: Blood pressure is on the low side we will hold off all blood pressure medications we will go ahead and start her on midodrine to maintain blood pressure while aggressively diuresing her.    Acquired hypothyroidism: Continue home Synthroid dose.    Type 2 diabetes mellitus with nephropathy ([facility]): As per hospitalist service.    Cellulitis of both lower extremities: IV antibiotics have been started.    Bradycardia: Currently bradycardic will hold beta-blockers for now.      PLAN:  Renal function is slightly worse, increasing urine output noted, she will get her last dose of 120 mg of Lasix IV around 10 AM.  I will continue another 24 hours of the same dose.  Although patient has refused dialysis again this admission I had discussion with the POA she was fairly clear that she will have to do dialysis if she needs it.  She will stop by today and talk to her.  We will watch her renal function over the weekend and make the decision Sunday or Monday.  Increase potassium noted we will give her a dose of  Kayexalate 30 g.  She has significantly increased blood pressure, midodrine has been stopped we will start her on hydralazine 25 mg 3 times a day.  Details were discussed with the patient as well as family.    Details were also discussed with the hospitalist service.   Continue with rest of the current treatment plan, and monitor with surveillance labs.  Further recommendations will depend on clinical course of the patient during the current hospitalization.     Thank you for involving us in the care of Millicent Mckeon.  Please feel free to call with any questions.    Milad Leone MD  04/01/22  07:48 EDT    [facility]  [phone]      Much of this encounter note is an electronic transcription/translation of spoken language to printed text. The electronic translation of spoken language may permit erroneous, or at times, nonsensical words or phrases to be inadvertently transcribed; Although I have reviewed the note for such errors, some may still exist.

## 2022-04-01 NOTE — PROGRESS NOTES
[facility]    Name:  Millicent Mckeon   Age:  61 y.o.  Sex:  female  :  1960  MRN:  [id number]   Visit Number:  [id number]  Admission Date:  3/30/2022  Date Of Service:  22  Primary Care Physician:  Marianela Albright APRN     LOS: 0 days :    Chief Complaint:      Generalized weakness and nausea.    Subjective:    Ms. Mckeon was seen and examined this morning.  She is complaining of nausea but denies any abdominal pain.  Fortunately, her hypotension has significantly improved with broad-spectrum antibiotics therapy with Zosyn and vancomycin started yesterday.  She was started on midodrine and her blood pressures are elevated today.  I will go ahead and discontinue the midodrine.  No fevers.  She did have left internal jugular central venous line placed by Dr. Guadalupe yesterday due to poor IV access.    Hospital Course:    Ms. Mckeon is a 61-year-old chronically ill lady with history of atrial fibrillation, chronic systolic heart failure, chronic kidney disease stage IV, chronic cor pulmonale, morbid obesity, functional quadriplegia who is wheelchair-bound was brought to the emergency room by EMS with symptoms of generalized weakness.  Apparently she was found at home disheveled, covered in various stages of skin sores and wounds.  She was discharged from this facility on 3/7/2022 after being treated for severe hypothermia likely environmental associated with hypoglycemia and bilateral lower extremity cellulitis.  Patient apparently lives alone after her sister passed away in 2021.       In the emergency room, she was afebrile with a pulse of 47 and blood pressure of 121/82 and saturating at 94% on room air.  Blood work done in the emergency room revealed a glucose of 40 with a creatinine of 3.74, BUN of 75, alkaline phosphate of 261 and a hemoglobin of 10.8.  Her proBNP level was 17,302 but troponin was negative.  Chest x-ray showed cardiomegaly with  bilateral homogeneous opacity in the lower zones.  Patient was given IV dextrose 1 ampoule in the emergency room.  She was also thought to have bilateral pneumonia and was placed on Rocephin and azithromycin and was admitted to the medical floor with telemetry.  Unfortunately after admission she developed low normal blood pressures and hypothermia.  She was also drowsy.  She was seen by Dr. Cutler and he started her on midodrine and IV Lasix.  A Wilkinson catheter was placed.  Due to suspicion of sepsis, and recent hospitalization for antibody coverage was escalated to Zosyn and vancomycin.  Due to poor IV access, Dr. Guadalupe placed a left internal jugular central venous line on 3/31/2022.  With broadening of the IV antibiotics therapy, midodrine, her blood pressure improved.  She was continued on IV diuretic therapy.  Once her nasal swab for MRSA came back negative, her vancomycin was discontinued.    Review of Systems:     All systems were reviewed and negative except as mentioned in subjective, assessment and plan.    Vital Signs:    Temp:  [94.3 °F (34.6 °C)-97.9 °F (36.6 °C)] 97.3 °F (36.3 °C)  Heart Rate:  [] 98  Resp:  [19-22] 22  BP: (101-158)/() 147/98    Intake and output:    I/O last 3 completed shifts:  In: 1105 [P.O.:300; I.V.:655; IV Piggyback:150]  Out: 500 [Urine:500]  I/O this shift:  In: -   Out: 700 [Urine:700]    Physical Examination:    General Appearance:  Alert and cooperative.  Not in any distress.   Head:  Atraumatic and normocephalic.   Eyes: Conjunctivae and sclerae normal, no icterus. No pallor.   Throat: No oral lesions, no thrush, oral mucosa moist.   Neck: Supple, trachea midline, no thyromegaly.  Left internal jugular central venous line noted.   Lungs:   Breath sounds decreased bilaterally in the bases.  No wheezing.  Bilateral scattered crackles heard. No Pleural rub or bronchial breathing.   Heart:  Normal S1 and S2, no murmur, no gallop, no rub. No JVD.   Abdomen:   Normal  bowel sounds, no masses, no organomegaly. Soft, nontender, obese with a large pannus, no rebound tenderness.  Wilkinson catheter is in place.   Extremities: Supple, peripheral cyanosis in the toes, no clubbing.  3+ pitting edema noted all the way up to her thighs with increased erythema and peripheral cyanosis in the lower extremities associated with multiple skin ulcerations and scabs.   Skin: No bleeding.  Multiple ulcers noted in the lower extremity with chronic erythema and venous stasis changes.   Neurologic: Alert and oriented x 3. No facial asymmetry. Moves all four limbs but does have generalized weakness. No tremors.      Laboratory results:    Results from last 7 days   Lab Units 04/01/22  0421 03/31/22  0541 03/30/22  1534   SODIUM mmol/L 140 141 142   POTASSIUM mmol/L 5.4* 4.9 4.5   CHLORIDE mmol/L 99 101 100   CO2 mmol/L 19.4* 21.8* 24.2   BUN mg/dL 87* 85* 75*   CREATININE mg/dL 4.46* 4.14* 3.74*   CALCIUM mg/dL 7.9* 7.9* 8.0*   BILIRUBIN mg/dL  --   --  0.7   ALK PHOS U/L  --   --  261*   ALT (SGPT) U/L  --   --  17   AST (SGOT) U/L  --   --  22   GLUCOSE mg/dL 76 83 40*     Results from last 7 days   Lab Units 03/31/22  0541 03/30/22  1534   WBC 10*3/mm3 5.67 4.54   HEMOGLOBIN g/dL 10.3* 10.8*   HEMATOCRIT % 32.9* 35.1   PLATELETS 10*3/mm3 107* 106*     Results from last 7 days   Lab Units 03/31/22  1343   INR  2.54*     Results from last 7 days   Lab Units 03/30/22  1534   TROPONIN T ng/mL 0.025     I have reviewed the patient's laboratory results.    Radiology results:    XR Chest 1 View    Result Date: 3/31/2022  FINAL REPORT CLINICAL HISTORY: central line placement verification FINDINGS: COMPARISON: None   FINDINGS: The heart size is enlarged.  There is a left internal jugular line with the tip in the region of the distal SVC.  Patient is rotated to the right.  Multiple overlying EKG leads noted.  There is pulmonary vascular congestion.  No definite pleural effusions seen..  The mediastinum is within  normal limits.  There are mild, bilateral perihilar infiltrates suggesting mild CHF.  There is no pleural effusion.  There is no pneumothorax.  The bony thorax is intact.     Impression: Left internal jugular catheter with tip in the region of the distal SVC; no pneumothorax identified.  Evidence of mild congestive heart failure. Authenticated by Cookie Abarca MD on 03/31/2022 06:13:47 PM    XR Chest 1 View    Result Date: 3/31/2022  PORTABLE CHEST  3/30/2022 4:40 PM  HISTORY: AMS  COMPARISON:  March 4, 2022  FINDINGS: The cardiac silhouette is enlarged with severe right atrial enlargement. Pulmonary vascular congestion.  No focal lung consolidation. There is no pneumothorax. The visualized osseous structures demonstrate no acute abnormalities.      Impression: Cardiomegaly with central pulmonary vascular congestion.      This report was signed and finalized on 3/31/2022 7:58 AM by Twila Field MD.    I have reviewed the patient's radiology reports.    Medication Review:     I have reviewed the patient's active and prn medications.     Problem List:      Hypoglycemia    Bilateral edema of lower extremity    Essential hypertension    Acquired hypothyroidism    Type 2 diabetes mellitus with nephropathy (HCC)    Cellulitis of both lower extremities    Anemia due to stage 4 chronic kidney disease (HCC)    Chronic diastolic congestive heart failure (HCC)    Cor pulmonale, chronic (HCC)    Chronic kidney disease, stage IV (severe) (HCC)    Bradycardia    Assessment:    1. Sepsis with generalized weakness, hypothermia and hypotension secondary to #2.  2. Bilateral lower extremity cellulitis, POA.  3. Acute hypoglycemia, POA, improved.  4. Severe bradycardia possibly related to medications in the setting of worsening renal failure, improved.  5. Progressive worsening of chronic kidney disease stage IV with volume overload, POA.  6. Chronic venous stasis of the lower extremities with multiple skin ulcers,  POA..  7. Chronic systolic heart failure with ejection fraction of 35%.  8. Paroxysmal atrial fibrillation on apixaban.  9. Chronic cor pulmonale.  10. Chronic hypoxic respiratory failure on home oxygen.  11. Diabetes mellitus type 2 with nephropathy.  12. Morbid obesity with a BMI of 57.  13. Anemia of chronic kidney disease.  14. Functional quadriplegia.  15. Acquired hypothyroidism.    Plan:    Sepsis/hypothermia/hypotension.  -This is likely secondary to bilateral lower extremity cellulitis that was present on admission.  Initially there was suspicion for pneumonia but I do not think she has pneumonia and initial chest x-ray findings were more related to volume overload.  -After initiation of IV antibiotics therapy with Zosyn, this seems to be improving.  -Nasal swab for MRSA is negative and I will discontinue vancomycin today.  -Wound care services have seen the patient for bilateral lower extremity cellulitis and ulcerations.  -Initial blood culture has been negative so far.    Generalized weakness/hypoglycemia/bradycardia.  -Unfortunately, patient has multiple comorbidities and has progressive decline in her health in the last several months.  Her current symptomatology is likely due to a combination of hypoglycemia, bradycardia, worsening renal failure and congestive heart failure.  -Bradycardia has improved and I will start her back on her Toprol-XL at a lower dose today.  -We will continue to hold amiodarone.    Progressive worsening of chronic kidney disease stage IV.  -Patient does have significant volume overload secondary to worsening CKD.  -I have discussed the patient's condition and treatment plan with Dr. Leone.  -We will continue IV Lasix.  She had about 450 mL of urine output since yesterday.  -Dr. Cutler feels that if the patient does not improve with regards to her renal failure with conservative management, she will need hemodialysis.  Patient however, is not keen on undergoing  hemodialysis.    I asked the patient if she is depressed as she does not want to undergo any definitive treatment and wants to go back and is not interested in going to rehabilitation.  Patient clearly denied that she is depressed.    Discussed with nursing staff at the bedside.  Discussed with multidisciplinary team.    DVT Prophylaxis: Apixaban  Code Status: DNR/DNI  Diet: Diabetic renal  Discharge Plan: Pending-patient will need short-term rehab versus long-term care.      Samson Reyes MD  04/01/22  09:08 EDT    Dictated utilizing Dragon dictation.

## 2022-04-01 NOTE — CONSULTS
"Adult Nutrition  Assessment/PES    Patient Name:  Millicent Mckeon  YOB: 1960  MRN: [id number]  Admit Date:  3/30/2022    Assessment Date:  4/1/2022    Comments:      Recommend:  1. Continue current diet as medically appropriate and tolerated.   2. Consider adding cardiac to existing diet order.   3. Continue to encourage PO intake as appropriate. Avg of 13% x 2 meals; pt refusing meals at times per RN.   4. RD ordered Arginaid TID and Nova Renal BID.  5. Consider renal MVI with minerals daily.   6. Continue to monitor and replace electrolytes PRN.    RD to follow pt and available PRN.      Reason for Assessment     Row Name 04/01/22 1437          Reason for Assessment    Reason For Assessment per organizational policy;diagnosis/disease state;identified at risk by screening criteria;nurse/nurse practitioner consult     Diagnosis cardiac disease;diabetes diagnosis/complications;renal disease;infection/sepsis     Identified At Risk by Screening Criteria large or nonhealing wound, burn or pressure injury;need for education                  Anthropometrics     Row Name 04/01/22 1536          Anthropometrics    Height 167.4 cm (65.91\")     Weight 152 kg (335 lb 1.6 oz)     Age for Calculations 61     Height for Calculation 1.598 m (5' 2.91\")     Weight for Calculation 143 kg (315 lb)  est dry BW            Ideal Body Weight (IBW)    Retired Ideal Body Weight (IBW) (kg) 59.37     Retired % Ideal Body Weight 256.02            Retired Body Mass Index (BMI)    Retired BMI (kg/m2) 54.35                Labs/Tests/Procedures/Meds     Row Name 04/01/22 1533          Labs/Procedures/Meds    Lab Results Reviewed reviewed, pertinent     Lab Results Comments high: K+, BUN Cr, phos, Mg Low: platelets            Medications    Pertinent Medications Reviewed reviewed, pertinent     Pertinent Medications Comments colace, ferrous sulfate, lactobacillus, protonix, NaCl                Physical Findings     Row Name 04/01/22 " "1534          Physical Findings    Overall Physical Appearance obese, chronic bilateral cyanosis, 3+ pitting edema BLE, fx quadraplegia, abd distentions, cellulitis, diabetic ulcer bilateral legs, PI coccyx, anasarca, nausea                Estimated/Assessed Needs - Anthropometrics     Row Name 04/01/22 1536          Anthropometrics    Height 167.4 cm (65.91\")     Weight 152 kg (335 lb 1.6 oz)     Age for Calculations 61     Height for Calculation 1.598 m (5' 2.91\")     Weight for Calculation 143 kg (315 lb)  est dry BW            Estimated/Assessed Needs    Additional Documentation Estimated Calorie Needs (Group);[place]-St. Jeor Equation (Group);Fluid Requirements (Group);Protein Requirements (Group);KCAL/KG (Group)            Estimated Calorie Needs    Estimated Calorie Requirement (kcal/day) 2858  20 kcal/kg     Estimated Calorie Need Method kcal/kg            KCAL/KG    KCAL/KG 25 Kcal/Kg (kcal);20 Kcal/Kg (kcal)     20 Kcal/Kg (kcal) 2857.66     25 Kcal/Kg (kcal) 3572.075            [place]-St. Jeor Equation    RMR ([place]-St. Jeor Equation) 1999.25151     [place]-St. Jeor Activity Factors 1.4 - 1.5     Activity Factors ([place]-St. Jeor) 2798.26727 - [phone]            Protein Requirements    Weight Used For Protein Calculations 143 kg (315 lb)  est dry BW     Est Protein Requirement Amount (gms/kg) 1.1 gm protein  157     Estimated Protein Requirements (gms/day) 157.17            Fluid Requirements    Fluid Requirements (mL/day) 1625     Estimated Fluid Requirement Method other (see comments)  500 mL + output     RDA Method (mL) 1625                Nutrition Prescription Ordered     Row Name 04/01/22 1538          Nutrition Prescription PO    Current PO Diet Regular     Common Modifiers Consistent Carbohydrate;Renal                Evaluation of Received Nutrient/Fluid Intake     Row Name 04/01/22 1536          PO Evaluation    Number of Days PO Intake Evaluated 1 day     Number of Meals 2     % PO " Intake 13                     Problem/Interventions:   Problem 1     Row Name 04/01/22 1539          Nutrition Diagnoses Problem 1    Problem 1 Predicted Suboptimal Intake     Etiology (related to) Medical Diagnosis     Renal CKD     Signs/Symptoms (evidenced by) PO Intake     Percent (%) intake recorded 13 %     Over number of meals 2                Problem 2     Row Name 04/01/22 1540          Nutrition Diagnoses Problem 2    Problem 2 Increased Nutrient Needs     Macronutrient Kcal;Fluid;Protein     Micronutrient Vitamin;Mineral     Etiology (related to) Medical Diagnosis     Skin Cellulitis;Pressure injury     Signs/Symptoms (evidenced by) Other (comment)  diabetic ulcer bilateral legs, PI coccyx                    Intervention Goal     Row Name 04/01/22 1541          Intervention Goal    General Maintain nutrition;Disease management/therapy;Improved nutrition related lab(s);Reduce/improve symptoms;Meet nutritional needs for age/condition     PO Meet estimated needs;Establish PO;Tolerate PO;Increase intake     Weight No significant weight loss                Nutrition Intervention     Row Name 04/01/22 1541          Nutrition Intervention    RD/Tech Action Follow Tx progress;Care plan reviewd;Encourage intake;Recommend/ordered     Recommended/Ordered Supplement;Snack                Nutrition Prescription     Row Name 04/01/22 1541          Nutrition Prescription PO    PO Prescription Begin/change diet;Begin/change supplement;Other (comment)  continue current diet as medically appropriate and tolerated     Begin/Change Diet to Regular     Supplement Nova Renal  Arginaid TID     Supplement Frequency 2 times a day     Common Modifiers Cardiac;Consistent Carbohydrate;Renal     New PO Prescription Ordered? No, recommended            Other Orders    Obtain Weight Daily     Obtain Weight Ordered? No, recommended     Supplement Vitamin mineral supplement  renal MVI     Supplement Ordered? No, recommended     Labs Hgb  A1c     Labs Ordered? No, recommended     Other continue to monitor and replace electrolytes PRN                Education/Evaluation     Row Name 04/01/22 1542          Education    Education Education not appropriate at this time     Please explain Defer until post ICU;Patient confusion            Monitor/Evaluation    Monitor Per protocol;I&O;PO intake;Supplement intake;Weight;Skin status;Pertinent labs;Symptoms                 Electronically signed by:  Louise Casiano RD  04/01/22 15:42 EDT

## 2022-04-02 LAB
ALBUMIN SERPL-MCNC: 3.5 G/DL (ref 3.5–5.2)
ANION GAP SERPL CALCULATED.3IONS-SCNC: 24.9 MMOL/L (ref 5–15)
BUN SERPL-MCNC: 94 MG/DL (ref 8–23)
BUN/CREAT SERPL: 19.8 (ref 7–25)
CALCIUM SPEC-SCNC: 8.1 MG/DL (ref 8.6–10.5)
CHLORIDE SERPL-SCNC: 98 MMOL/L (ref 98–107)
CO2 SERPL-SCNC: 18.1 MMOL/L (ref 22–29)
CREAT SERPL-MCNC: 4.74 MG/DL (ref 0.57–1)
DEPRECATED RDW RBC AUTO: 60.5 FL (ref 37–54)
EGFRCR SERPLBLD CKD-EPI 2021: 9.8 ML/MIN/1.73
ERYTHROCYTE [DISTWIDTH] IN BLOOD BY AUTOMATED COUNT: 21.7 % (ref 12.3–15.4)
GLUCOSE BLDC GLUCOMTR-MCNC: 128 MG/DL (ref 70–130)
GLUCOSE BLDC GLUCOMTR-MCNC: 165 MG/DL (ref 70–130)
GLUCOSE BLDC GLUCOMTR-MCNC: 222 MG/DL (ref 70–130)
GLUCOSE BLDC GLUCOMTR-MCNC: 239 MG/DL (ref 70–130)
GLUCOSE SERPL-MCNC: 132 MG/DL (ref 65–99)
HCT VFR BLD AUTO: 34.1 % (ref 34–46.6)
HGB BLD-MCNC: 10.2 G/DL (ref 12–15.9)
INR PPP: 3.3 (ref 0.9–1.1)
MCH RBC QN AUTO: 23.7 PG (ref 26.6–33)
MCHC RBC AUTO-ENTMCNC: 29.9 G/DL (ref 31.5–35.7)
MCV RBC AUTO: 79.1 FL (ref 79–97)
PHOSPHATE SERPL-MCNC: 6.6 MG/DL (ref 2.5–4.5)
PLATELET # BLD AUTO: 88 10*3/MM3 (ref 140–450)
POTASSIUM SERPL-SCNC: 4.6 MMOL/L (ref 3.5–5.2)
PROTHROMBIN TIME: 34.8 SECONDS (ref 12.5–14.5)
RBC # BLD AUTO: 4.31 10*6/MM3 (ref 3.77–5.28)
SODIUM SERPL-SCNC: 141 MMOL/L (ref 136–145)
WBC NRBC COR # BLD: 6.23 10*3/MM3 (ref 3.4–10.8)

## 2022-04-02 PROCEDURE — 85027 COMPLETE CBC AUTOMATED: CPT | Performed by: INTERNAL MEDICINE

## 2022-04-02 PROCEDURE — 99232 SBSQ HOSP IP/OBS MODERATE 35: CPT | Performed by: INTERNAL MEDICINE

## 2022-04-02 PROCEDURE — 25010000002 PIPERACILLIN SOD-TAZOBACTAM PER 1 G

## 2022-04-02 PROCEDURE — 85610 PROTHROMBIN TIME: CPT | Performed by: INTERNAL MEDICINE

## 2022-04-02 PROCEDURE — 25010000002 FUROSEMIDE PER 20 MG: Performed by: INTERNAL MEDICINE

## 2022-04-02 PROCEDURE — 82962 GLUCOSE BLOOD TEST: CPT

## 2022-04-02 PROCEDURE — 80069 RENAL FUNCTION PANEL: CPT | Performed by: INTERNAL MEDICINE

## 2022-04-02 RX ORDER — METOPROLOL SUCCINATE 100 MG/1
100 TABLET, EXTENDED RELEASE ORAL
Status: DISCONTINUED | OUTPATIENT
Start: 2022-04-03 | End: 2022-04-08

## 2022-04-02 RX ORDER — FOLIC ACID/VIT B COMPLEX AND C 0.8 MG
1 TABLET ORAL DAILY
Status: DISCONTINUED | OUTPATIENT
Start: 2022-04-02 | End: 2022-04-14 | Stop reason: HOSPADM

## 2022-04-02 RX ORDER — FUROSEMIDE 10 MG/ML
80 INJECTION INTRAMUSCULAR; INTRAVENOUS EVERY 6 HOURS
Status: COMPLETED | OUTPATIENT
Start: 2022-04-02 | End: 2022-04-02

## 2022-04-02 RX ADMIN — ASPIRIN 81 MG: 81 TABLET, CHEWABLE ORAL at 09:04

## 2022-04-02 RX ADMIN — Medication 10 ML: at 09:05

## 2022-04-02 RX ADMIN — GUAIFENESIN 600 MG: 600 TABLET, EXTENDED RELEASE ORAL at 20:18

## 2022-04-02 RX ADMIN — GUAIFENESIN 600 MG: 600 TABLET, EXTENDED RELEASE ORAL at 09:05

## 2022-04-02 RX ADMIN — ISOSORBIDE MONONITRATE 30 MG: 30 TABLET, EXTENDED RELEASE ORAL at 09:04

## 2022-04-02 RX ADMIN — LIDOCAINE 1 PATCH: 50 PATCH CUTANEOUS at 20:17

## 2022-04-02 RX ADMIN — Medication 3 ML: at 09:06

## 2022-04-02 RX ADMIN — HYDRALAZINE HYDROCHLORIDE 25 MG: 25 TABLET ORAL at 15:09

## 2022-04-02 RX ADMIN — FUROSEMIDE 80 MG: 10 INJECTION, SOLUTION INTRAMUSCULAR; INTRAVENOUS at 23:41

## 2022-04-02 RX ADMIN — DOCUSATE SODIUM 100 MG: 100 CAPSULE, LIQUID FILLED ORAL at 09:05

## 2022-04-02 RX ADMIN — Medication 1 CAPSULE: at 09:04

## 2022-04-02 RX ADMIN — TAZOBACTAM SODIUM AND PIPERACILLIN SODIUM 4.5 G: 500; 4 INJECTION, SOLUTION INTRAVENOUS at 15:09

## 2022-04-02 RX ADMIN — FUROSEMIDE 120 MG: 10 INJECTION, SOLUTION INTRAMUSCULAR; INTRAVENOUS at 04:30

## 2022-04-02 RX ADMIN — HYDRALAZINE HYDROCHLORIDE 25 MG: 25 TABLET ORAL at 06:43

## 2022-04-02 RX ADMIN — FERROUS SULFATE TAB EC 324 MG (65 MG FE EQUIVALENT) 324 MG: 324 (65 FE) TABLET DELAYED RESPONSE at 17:38

## 2022-04-02 RX ADMIN — FUROSEMIDE 120 MG: 10 INJECTION, SOLUTION INTRAMUSCULAR; INTRAVENOUS at 09:04

## 2022-04-02 RX ADMIN — METOPROLOL SUCCINATE 50 MG: 50 TABLET, EXTENDED RELEASE ORAL at 09:04

## 2022-04-02 RX ADMIN — FUROSEMIDE 80 MG: 10 INJECTION, SOLUTION INTRAMUSCULAR; INTRAVENOUS at 12:23

## 2022-04-02 RX ADMIN — LEVOTHYROXINE SODIUM 150 MCG: 150 TABLET ORAL at 09:04

## 2022-04-02 RX ADMIN — Medication 1 TABLET: at 09:08

## 2022-04-02 RX ADMIN — SILVER SULFADIAZINE 1 APPLICATION: 10 CREAM TOPICAL at 09:06

## 2022-04-02 RX ADMIN — Medication 10 ML: at 20:17

## 2022-04-02 RX ADMIN — SILVER SULFADIAZINE 1 APPLICATION: 10 CREAM TOPICAL at 20:34

## 2022-04-02 RX ADMIN — Medication 1 CAPSULE: at 20:18

## 2022-04-02 RX ADMIN — HYDRALAZINE HYDROCHLORIDE 25 MG: 25 TABLET ORAL at 22:59

## 2022-04-02 RX ADMIN — TAZOBACTAM SODIUM AND PIPERACILLIN SODIUM 4.5 G: 500; 4 INJECTION, SOLUTION INTRAVENOUS at 04:30

## 2022-04-02 RX ADMIN — DOCUSATE SODIUM 100 MG: 100 CAPSULE, LIQUID FILLED ORAL at 20:18

## 2022-04-02 RX ADMIN — Medication 10 ML: at 20:18

## 2022-04-02 RX ADMIN — PANTOPRAZOLE SODIUM 40 MG: 40 TABLET, DELAYED RELEASE ORAL at 09:05

## 2022-04-02 RX ADMIN — AZITHROMYCIN MONOHYDRATE 250 MG: 250 TABLET ORAL at 09:04

## 2022-04-02 RX ADMIN — FUROSEMIDE 80 MG: 10 INJECTION, SOLUTION INTRAMUSCULAR; INTRAVENOUS at 17:44

## 2022-04-02 RX ADMIN — FERROUS SULFATE TAB EC 324 MG (65 MG FE EQUIVALENT) 324 MG: 324 (65 FE) TABLET DELAYED RESPONSE at 09:04

## 2022-04-02 NOTE — PROGRESS NOTES
[facility]    Name:  Millicent Mckeon   Age:  61 y.o.  Sex:  female  :  1960  MRN:  [id number]   Visit Number:  [id number]  Admission Date:  3/30/2022  Date Of Service:  22  Primary Care Physician:  Marianela Albright APRN     LOS: 1 day :    Chief Complaint:      Generalized weakness.    Subjective:    Ms. Mckeon was seen and examined this morning.  She looks more perky today and clinically looks improved with regards to her color and mental status.  She was able to answer all my questions appropriately.  She is still adamant that she does not want dialysis and wants to be DNR.  She states that she just wants to go home and be comfortable.  She has had improved urine output with 3600 mL in the last 24 hours with help of diuresis.  She is being followed by Dr. Cutler.  No significant overnight events.    Hospital Course:    Ms. Mckeon is a 61-year-old chronically ill lady with history of atrial fibrillation, chronic systolic heart failure, chronic kidney disease stage IV, chronic cor pulmonale, morbid obesity, functional quadriplegia who is wheelchair-bound was brought to the emergency room by EMS with symptoms of generalized weakness.  Apparently she was found at home disheveled, covered in various stages of skin sores and wounds.  She was discharged from this facility on 3/7/2022 after being treated for severe hypothermia likely environmental associated with hypoglycemia and bilateral lower extremity cellulitis.  Patient apparently lives alone after her sister passed away in 2021.       In the emergency room, she was afebrile with a pulse of 47 and blood pressure of 121/82 and saturating at 94% on room air.  Blood work done in the emergency room revealed a glucose of 40 with a creatinine of 3.74, BUN of 75, alkaline phosphate of 261 and a hemoglobin of 10.8.  Her proBNP level was 17,302 but troponin was negative.  Chest x-ray showed cardiomegaly with  bilateral homogeneous opacity in the lower zones.  Patient was given IV dextrose 1 ampoule in the emergency room.  She was also thought to have bilateral pneumonia and was placed on Rocephin and azithromycin and was admitted to the medical floor with telemetry.  Unfortunately after admission she developed low normal blood pressures and hypothermia.  She was also drowsy.  She was seen by Dr. Cutler and he started her on midodrine and IV Lasix.  A Wilkinson catheter was placed.  Due to suspicion of sepsis, and recent hospitalization for antibody coverage was escalated to Zosyn and vancomycin.  Due to poor IV access, Dr. Guadalupe placed a left internal jugular central venous line on 3/31/2022.  With broadening of the IV antibiotics therapy, midodrine, her blood pressure improved.  She was continued on IV diuretic therapy.  Once her nasal swab for MRSA came back negative, her vancomycin was discontinued.  Patient improved slowly with regards to her urine output.  She was seen by behavioral health services and she declined any help and denied depression.  She was seen by palliative care services.  Patient does not want dialysis and wants to be DNR.  She wants to go home and be comfortable when discharged from the hospital.  She has refused to go to short-term rehabilitation on multiple occasions.    Review of Systems:     All systems were reviewed and negative except as mentioned in subjective, assessment and plan.    Vital Signs:    Temp:  [96.7 °F (35.9 °C)-97.4 °F (36.3 °C)] 96.7 °F (35.9 °C)  Heart Rate:  [93-96] 94  Resp:  [16-26] 20  BP: (116-148)/() 116/76    Intake and output:    I/O last 3 completed shifts:  In: 1732.5 [P.O.:660; I.V.:1072.5]  Out: 3950 [Urine:3950]  I/O this shift:  In: 600 [P.O.:600]  Out: -     Physical Examination:    General Appearance:  Alert and cooperative. Not in any distress.   Head:  Atraumatic and normocephalic.   Eyes: Conjunctivae and sclerae normal, no icterus. No pallor.   Throat:  No oral lesions, no thrush, oral mucosa moist.   Neck: Supple, trachea midline, no thyromegaly.  Left internal jugular central venous line noted.   Lungs:   Breath sounds decreased bilaterally in the bases.  No wheezing.  Bilateral scattered crackles heard. No Pleural rub or bronchial breathing.   Heart:  Normal S1 and S2, no murmur, no gallop, no rub. No JVD.   Abdomen:   Normal bowel sounds, no masses, no organomegaly. Soft, nontender, obese with a large pannus, no rebound tenderness.  Wilkinson catheter is in place.   Extremities: Supple, peripheral cyanosis in the toes, no clubbing.  3+ pitting edema noted all the way up to her thighs with increased erythema and peripheral cyanosis in the lower extremities associated with multiple skin ulcerations and scabs.   Skin: No bleeding.  Multiple ulcers noted in the lower extremity with chronic erythema and venous stasis changes.   Neurologic: Alert and oriented x 3. No facial asymmetry. Moves all four limbs but does have generalized weakness. No tremors.      Laboratory results:    Results from last 7 days   Lab Units 04/02/22 0453 04/01/22 0421 03/31/22  0541 03/30/22  1534   SODIUM mmol/L 141 140 141 142   POTASSIUM mmol/L 4.6 5.4* 4.9 4.5   CHLORIDE mmol/L 98 99 101 100   CO2 mmol/L 18.1* 19.4* 21.8* 24.2   BUN mg/dL 94* 87* 85* 75*   CREATININE mg/dL 4.74* 4.46* 4.14* 3.74*   CALCIUM mg/dL 8.1* 7.9* 7.9* 8.0*   BILIRUBIN mg/dL  --   --   --  0.7   ALK PHOS U/L  --   --   --  261*   ALT (SGPT) U/L  --   --   --  17   AST (SGOT) U/L  --   --   --  22   GLUCOSE mg/dL 132* 76 83 40*     Results from last 7 days   Lab Units 04/02/22 0453 03/31/22  0541 03/30/22  1534   WBC 10*3/mm3 6.23 5.67 4.54   HEMOGLOBIN g/dL 10.2* 10.3* 10.8*   HEMATOCRIT % 34.1 32.9* 35.1   PLATELETS 10*3/mm3 88* 107* 106*     Results from last 7 days   Lab Units 04/02/22  0453 03/31/22  1343   INR  3.30* 2.54*     Results from last 7 days   Lab Units 03/30/22  1534   TROPONIN T ng/mL 0.025     I  have reviewed the patient's laboratory results.    Radiology results:    XR Chest 1 View    Result Date: 3/31/2022  FINAL REPORT CLINICAL HISTORY: central line placement verification FINDINGS: COMPARISON: None   FINDINGS: The heart size is enlarged.  There is a left internal jugular line with the tip in the region of the distal SVC.  Patient is rotated to the right.  Multiple overlying EKG leads noted.  There is pulmonary vascular congestion.  No definite pleural effusions seen..  The mediastinum is within normal limits.  There are mild, bilateral perihilar infiltrates suggesting mild CHF.  There is no pleural effusion.  There is no pneumothorax.  The bony thorax is intact.     Impression: Left internal jugular catheter with tip in the region of the distal SVC; no pneumothorax identified.  Evidence of mild congestive heart failure. Authenticated by Cookie Abarca MD on 03/31/2022 06:13:47 PM    I have reviewed the patient's radiology reports.    Medication Review:     I have reviewed the patient's active and prn medications.     Problem List:      Hypoglycemia    Bilateral edema of lower extremity    Essential hypertension    Acquired hypothyroidism    Type 2 diabetes mellitus with nephropathy (HCC)    Cellulitis of both lower extremities    Anemia due to stage 4 chronic kidney disease (HCC)    Chronic diastolic congestive heart failure (HCC)    Cor pulmonale, chronic (HCC)    Chronic kidney disease, stage IV (severe) (HCC)    Bradycardia    Assessment:    1. Sepsis with generalized weakness, hypothermia and hypotension secondary to #2.  2. Bilateral lower extremity cellulitis, POA.  3. Acute hypoglycemia, POA, improved.  4. Severe bradycardia possibly related to medications in the setting of worsening renal failure, improved.  5. Progressive worsening of chronic kidney disease stage IV with volume overload, POA.  6. Chronic venous stasis of the lower extremities with multiple skin ulcers, POA..  7. Chronic systolic  heart failure with ejection fraction of 35%.  8. Paroxysmal atrial fibrillation on apixaban.  9. Chronic cor pulmonale.  10. Chronic hypoxic respiratory failure on home oxygen.  11. Diabetes mellitus type 2 with nephropathy.  12. Morbid obesity with a BMI of 57.  13. Anemia of chronic kidney disease.  14. Functional quadriplegia.  15. Acquired hypothyroidism.    Plan:    Sepsis/hypothermia/hypotension.  -This is likely secondary to bilateral lower extremity cellulitis that was present on admission.  -Continue IV antibiotics therapy with Zosyn (day 2).  -Nasal swab for MRSA was negative.  Blood cultures have been negative so far.  -Wound care services have seen the patient for bilateral lower extremity cellulitis and ulcerations.    Generalized weakness/hypoglycemia/bradycardia.  -Unfortunately, patient has multiple comorbidities and has progressive decline in her health in the last several months.  Her current symptomatology is likely due to a combination of hypoglycemia, bradycardia, worsening renal failure and congestive heart failure.  -Bradycardia has improved and her Toprol-XL has been restarted.  -We will continue to hold amiodarone.    Progressive worsening of chronic kidney disease stage IV.  -Patient does have significant volume overload secondary to worsening CKD.  -I have discussed the patient's condition and treatment plan with Dr. Leone.  -We will continue Lasix another day today.    Patient is being followed by palliative care services.    Discussed with nursing staff at the bedside.  I did discuss the patient's condition with behavioral health services yesterday.    DVT Prophylaxis: Apixaban  Code Status: DNR/DNI  Diet: Diabetic renal  Discharge Plan: Pending-SNF with hospice versus home with hospice.      Samson Reyes MD  04/02/22  13:00 EDT    Dictated utilizing Dragon dictation.

## 2022-04-02 NOTE — PLAN OF CARE
Goal Outcome Evaluation:   Pt. A/O  and has been more awake throughout the day today. VSS and will continue to monitor. Appetite has increased and has asked for snacks. [place]. Leg dressings changed. Bed and bath complete.

## 2022-04-02 NOTE — THERAPY EVALUATION
"Attempted PT evaluation this a.m. with pt initially agreeing to work with therapist. Pt alert and oriented x 4. At this time, physician stopped by to talk about possibly beginning dialysis. Pt refused to have dialysis and then stated she did not want PT either. Pt kept repeating  \"I just want to go home\". Pt's niece is to stop by and talk with pt about her wishes for a continued medical plan. Therapist will check back tomorrow and proceed as pt decided.    "

## 2022-04-02 NOTE — PROGRESS NOTES
[facility] Progress Note  [facility]. [place]        Patient Name: Millicent Mckeno  : 1960  MRN: [id number]   LOS: 1 day    Patient Care Team:  Marianela Albright APRN as PCP - General (Family Medicine)  Geremias Shepherd MD as Consulting Physician (General Surgery)  Lisa Cardoso MD as Surgeon (General Surgery)    Chief Complaint:    Chief Complaint   Patient presents with   • Weakness - Generalized     Unable to stand     Primary Care Physician:  Marianela Albright APRN  Date of admission: 3/30/2022    Subjective     Interval History:   Events noted from last 24 hours.  Currently she does appears to be doing better, lot more alert and interactive.  Today she again said that she does not want dialysis.  When I asked her about Val who is the POA she says she never came in to see her.  She said if she needs dialysis she will have to do it.  Although it does not appear that she needs it today but most likely will end up requiring dialysis this admission.  I will let them discussed this again during the day and see how she will respond over the next 2 days and make the final decision depending on what they decide and how her blood work looks.  She denies having any chest pain or shortness of breath.  No fever.  She keeps on repeating that she wants to go home.    Review of Systems:   As noted above    Objective     Vitals:   Temp:  [97.3 °F (36.3 °C)-98.4 °F (36.9 °C)] 97.3 °F (36.3 °C)  Heart Rate:  [93-98] 94  Resp:  [16-26] 20  BP: (116-148)/() 116/76  Flow (L/min):  [2] 2    Intake/Output Summary (Last 24 hours) at 2022 0951  Last data filed at 2022 0933  Gross per 24 hour   Intake 1507.51 ml   Output 2850 ml   Net -1342.49 ml       Physical Exam:    General Appearance: alert,  no acute distress   Skin: warm and dry  HEENT: oral mucosa normal, nonicteric sclera  Neck: supple, no JVD  Lungs: CTA  Heart: RRR, normal S1 and S2  Abdomen: Obese, soft to firm,  nontender, nondistended  : no palpable bladder  Extremities: 2-3+ edema, mostly it is dependent edema, no cyanosis or clubbing  Neuro: He is able to communicate and randomly moves extremities.    Scheduled Meds:     Current Facility-Administered Medications   Medication Dose Route Frequency Provider Last Rate Last Admin   • acetaminophen (TYLENOL) tablet 650 mg  650 mg Oral Q4H PRN Samson Reyes MD   650 mg at 03/31/22 0629    Or   • acetaminophen (TYLENOL) 160 MG/5ML solution 650 mg  650 mg Oral Q4H PRN Samson Reyes MD        Or   • acetaminophen (TYLENOL) suppository 650 mg  650 mg Rectal Q4H PRN Samson Reyes MD       • aspirin chewable tablet 81 mg  81 mg Oral Daily Samson Reyes MD   81 mg at 04/02/22 0904   • azithromycin (ZITHROMAX) tablet 250 mg  250 mg Oral Q24H Samson Reyes MD   250 mg at 04/02/22 0904   • b complex-vitamin c-folic acid (NEPHRO-LOIS) tablet 1 tablet  1 tablet Oral Daily Samson Reyes MD   1 tablet at 04/02/22 0908   • dextrose (D50W) (25 g/50 mL) IV injection 25 g  25 g Intravenous Q15 Min PRN Samson Reyes MD       • dextrose (D50W) (25 g/50 mL) IV injection 50 mL  50 mL Intravenous Q1H PRN Samson Reyes MD   50 mL at 03/30/22 2159   • dextrose (D50W) (25 g/50 mL) IV injection 50 mL  50 mL Intravenous Q1H PRN Jerry Vargas PA-C   50 mL at 03/31/22 0630   • dextrose (GLUTOSE) oral gel 1 tube  1 tube Oral Q15 Min PRN Samson Reyes MD       • docusate sodium (COLACE) capsule 100 mg  100 mg Oral BID Samson Reyes MD   100 mg at 04/02/22 0905   • ferrous sulfate EC tablet 324 mg  324 mg Oral BID With Meals Samson Reyes MD   324 mg at 04/02/22 0904   • glucagon (human recombinant) (GLUCAGEN DIAGNOSTIC) injection 1 mg  1 mg Subcutaneous PRN Samson Reyes MD       • guaiFENesin (MUCINEX) 12 hr tablet 600 mg  600 mg Oral Q12H Samson Reyes MD   600 mg at 04/02/22 0905   • hydrALAZINE (APRESOLINE) tablet 25 mg  25 mg Oral Q8H Milad Leone MD   25 mg at 04/02/22 0643   •  ipratropium-albuterol (DUO-NEB) nebulizer solution 3 mL  3 mL Nebulization Q4H PRN Samson Reyes MD       • isosorbide mononitrate (IMDUR) 24 hr tablet 30 mg  30 mg Oral Daily Samson Reyes MD   30 mg at 04/02/22 0904   • lactobacillus acidophilus (RISAQUAD) capsule 1 capsule  1 capsule Oral BID Samson Reyes MD   1 capsule at 04/02/22 0904   • levothyroxine (SYNTHROID, LEVOTHROID) tablet 150 mcg  150 mcg Oral Daily Samson Reyes MD   150 mcg at 04/02/22 0904   • lidocaine (LIDODERM) 5 % 1 patch  1 patch Transdermal Q24H Samson Reyes MD   1 patch at 04/01/22 2140   • metoprolol succinate XL (TOPROL-XL) 24 hr tablet 50 mg  50 mg Oral Q24H Samson Reyes MD   50 mg at 04/02/22 0904   • ondansetron (ZOFRAN) injection 4 mg  4 mg Intravenous Q6H PRN Samson Reyes MD       • pantoprazole (PROTONIX) EC tablet 40 mg  40 mg Oral Daily Samson Reyes MD   40 mg at 04/02/22 0905   • Pharmacy to Dose Zosyn   Does not apply Continuous PRN Samson Reyes MD       • piperacillin-tazobactam (ZOSYN) 4.5 g in iso-osmotic dextrose 100 mL IVPB (premix)  4.5 g Intravenous Q12H Charles Laird RPH   4.5 g at 04/02/22 0430   • silver sulfadiazine (SILVADENE, SSD) 1 % cream 1 application  1 application Topical Q12H Samson Reyes MD   1 application at 04/02/22 0906   • sodium chloride 0.9 % flush 10 mL  10 mL Intravenous PRN Samson Reyes MD       • sodium chloride 0.9 % flush 10 mL  10 mL Intravenous Q12H Samson Reeys MD   10 mL at 04/02/22 0905   • sodium chloride 0.9 % flush 10 mL  10 mL Intravenous Q12H aSmson Reyes MD   10 mL at 04/02/22 0905   • sodium chloride 0.9 % flush 10 mL  10 mL Intravenous Q12H Samson Reyes MD   10 mL at 04/02/22 0905   • sodium chloride 0.9 % flush 10 mL  10 mL Intravenous PRN Samson Reyes MD       • sodium chloride 0.9 % flush 20 mL  20 mL Intravenous PRN Samson Reyes MD       • sodium chloride 0.9 % flush 3 mL  3 mL Intravenous Q12H Samson Reyes MD   3 mL at 04/02/22 0906   • sodium chloride 0.9 % flush  3-10 mL  3-10 mL Intravenous PRN Samson Reyes MD           aspirin, 81 mg, Oral, Daily  azithromycin, 250 mg, Oral, Q24H  b complex-vitamin c-folic acid, 1 tablet, Oral, Daily  docusate sodium, 100 mg, Oral, BID  ferrous sulfate, 324 mg, Oral, BID With Meals  guaiFENesin, 600 mg, Oral, Q12H  hydrALAZINE, 25 mg, Oral, Q8H  isosorbide mononitrate, 30 mg, Oral, Daily  lactobacillus acidophilus, 1 capsule, Oral, BID  levothyroxine, 150 mcg, Oral, Daily  lidocaine, 1 patch, Transdermal, Q24H  metoprolol succinate XL, 50 mg, Oral, Q24H  pantoprazole, 40 mg, Oral, Daily  piperacillin-tazobactam, 4.5 g, Intravenous, Q12H  silver sulfadiazine, 1 application, Topical, Q12H  sodium chloride, 10 mL, Intravenous, Q12H  sodium chloride, 10 mL, Intravenous, Q12H  sodium chloride, 10 mL, Intravenous, Q12H  sodium chloride, 3 mL, Intravenous, Q12H        IV Meds:   Pharmacy to Dose Zosyn,         Results Reviewed:   I have personally reviewed the results from the time of this admission to 4/2/2022 09:51 EDT     Results from last 7 days   Lab Units 04/02/22  0453 04/01/22  0421 03/31/22  0541 03/30/22  1534   SODIUM mmol/L 141 140 141 142   POTASSIUM mmol/L 4.6 5.4* 4.9 4.5   CHLORIDE mmol/L 98 99 101 100   CO2 mmol/L 18.1* 19.4* 21.8* 24.2   BUN mg/dL 94* 87* 85* 75*   CREATININE mg/dL 4.74* 4.46* 4.14* 3.74*   CALCIUM mg/dL 8.1* 7.9* 7.9* 8.0*   BILIRUBIN mg/dL  --   --   --  0.7   ALK PHOS U/L  --   --   --  261*   ALT (SGPT) U/L  --   --   --  17   AST (SGOT) U/L  --   --   --  22   GLUCOSE mg/dL 132* 76 83 40*       Estimated Creatinine Clearance: 18.1 mL/min (A) (by C-G formula based on SCr of 4.74 mg/dL (H)).    Results from last 7 days   Lab Units 04/02/22  0453 04/01/22  0421 03/30/22  1534   MAGNESIUM mg/dL  --   --  2.5*   PHOSPHORUS mg/dL 6.6* 6.7*  --              Results from last 7 days   Lab Units 04/02/22  0453 03/31/22  0541 03/30/22  1534   WBC 10*3/mm3 6.23 5.67 4.54   HEMOGLOBIN g/dL 10.2* 10.3* 10.8*    PLATELETS 10*3/mm3 88* 107* 106*       Results from last 7 days   Lab Units 04/02/22  0453 03/31/22  1343   INR  3.30* 2.54*       Brief Urine Lab Results  (Last result in the past 365 days)      Color   Clarity   Blood   Leuk Est   Nitrite   Protein   CREAT   Urine HCG        03/30/22 1705 Yellow   Clear   Trace   Negative   Negative   >=300 mg/dL (3+)                 No results found for: UTPCR    Imaging Results (Last 24 Hours)     ** No results found for the last 24 hours. **              Assessment / Plan     ASSESSMENT:    Acute kidney injury: It is quite likely that she has worsening renal function secondary to her baseline worsening of renal function, it is quite possible that she has not been taking her medications in has worsening cardiorenal syndrome.  I will go ahead and optimize the medications and see if we can get some improvement in her renal function.  If no improvement her only option will be to start renal replacement therapy or go to in center hospice for comfort care only.    Chronic kidney disease, stage IV (severe) (HCC): Baseline diabetic and hypertensive nephropathy with likely combination of cardiorenal syndrome.    Anemia due to stage 4 chronic kidney disease (HCC): Hemoglobin appears to be fairly stable.    Chronic diastolic congestive heart failure (HCC): We will try to diurese with high-dose diuretics and see if she will respond to it.    Cor pulmonale, chronic (HCC): Longstanding history of untreated sleep apnea might be contributing.    Hypoglycemia: Oral hypoglycemic agents with prolonged effect from renal failure likely the cause.    Bilateral edema of lower extremity: She does not have much lower extremity edema most of the edema is in the abdominal and chest area appears to be having generalized anasarca.    Essential hypertension: Blood pressure is on the low side we will hold off all blood pressure medications we will go ahead and start her on midodrine to maintain blood  pressure while aggressively diuresing her.    Acquired hypothyroidism: Continue home Synthroid dose.    Type 2 diabetes mellitus with nephropathy (HCC): As per hospitalist service.    Cellulitis of both lower extremities: IV antibiotics have been started.    Bradycardia: Currently bradycardic will hold beta-blockers for now.      PLAN:  Renal function is slightly worse, increasing urine output noted.  She still has fairly significant edema we will continue IV diuretics will change Lasix to 80 mg IV every 6 hours x3 doses.  No acute indications for dialysis today.  Continue to optimize medications.  Details were discussed with the patient no family.    Details were also discussed with the hospitalist service as well as nursing staff.  Continue with rest of the current treatment plan, and monitor with surveillance labs.  Further recommendations will depend on clinical course of the patient during the current hospitalization.     Thank you for involving us in the care of Millicent Mckeon.  Please feel free to call with any questions.    Milad Leone MD  04/02/22  09:51 EDT    [facility]  [phone]      Much of this encounter note is an electronic transcription/translation of spoken language to printed text. The electronic translation of spoken language may permit erroneous, or at times, nonsensical words or phrases to be inadvertently transcribed; Although I have reviewed the note for such errors, some may still exist.

## 2022-04-03 LAB
ALBUMIN SERPL-MCNC: 3.5 G/DL (ref 3.5–5.2)
ALBUMIN/GLOB SERPL: 1.1 G/DL
ALP SERPL-CCNC: 250 U/L (ref 39–117)
ALT SERPL W P-5'-P-CCNC: 73 U/L (ref 1–33)
ANION GAP SERPL CALCULATED.3IONS-SCNC: 22.8 MMOL/L (ref 5–15)
AST SERPL-CCNC: 77 U/L (ref 1–32)
BILIRUB SERPL-MCNC: 0.7 MG/DL (ref 0–1.2)
BUN SERPL-MCNC: 101 MG/DL (ref 8–23)
BUN/CREAT SERPL: 21.5 (ref 7–25)
CALCIUM SPEC-SCNC: 7.9 MG/DL (ref 8.6–10.5)
CHLORIDE SERPL-SCNC: 97 MMOL/L (ref 98–107)
CO2 SERPL-SCNC: 21.2 MMOL/L (ref 22–29)
CREAT SERPL-MCNC: 4.69 MG/DL (ref 0.57–1)
DEPRECATED RDW RBC AUTO: 56.1 FL (ref 37–54)
EGFRCR SERPLBLD CKD-EPI 2021: 9.9 ML/MIN/1.73
ERYTHROCYTE [DISTWIDTH] IN BLOOD BY AUTOMATED COUNT: 21.2 % (ref 12.3–15.4)
GLOBULIN UR ELPH-MCNC: 3.1 GM/DL
GLUCOSE BLDC GLUCOMTR-MCNC: 241 MG/DL (ref 70–130)
GLUCOSE BLDC GLUCOMTR-MCNC: 326 MG/DL (ref 70–130)
GLUCOSE BLDC GLUCOMTR-MCNC: 334 MG/DL (ref 70–130)
GLUCOSE BLDC GLUCOMTR-MCNC: 362 MG/DL (ref 70–130)
GLUCOSE SERPL-MCNC: 251 MG/DL (ref 65–99)
HCT VFR BLD AUTO: 31.8 % (ref 34–46.6)
HGB BLD-MCNC: 9.7 G/DL (ref 12–15.9)
INR PPP: 1.84 (ref 0.9–1.1)
MCH RBC QN AUTO: 23.3 PG (ref 26.6–33)
MCHC RBC AUTO-ENTMCNC: 30.5 G/DL (ref 31.5–35.7)
MCV RBC AUTO: 76.3 FL (ref 79–97)
PLATELET # BLD AUTO: 77 10*3/MM3 (ref 140–450)
PMV BLD AUTO: 10.4 FL (ref 6–12)
POTASSIUM SERPL-SCNC: 4.5 MMOL/L (ref 3.5–5.2)
PROT SERPL-MCNC: 6.6 G/DL (ref 6–8.5)
PROTHROMBIN TIME: 21.9 SECONDS (ref 12.5–14.5)
RBC # BLD AUTO: 4.17 10*6/MM3 (ref 3.77–5.28)
SODIUM SERPL-SCNC: 141 MMOL/L (ref 136–145)
WBC NRBC COR # BLD: 6 10*3/MM3 (ref 3.4–10.8)

## 2022-04-03 PROCEDURE — 85027 COMPLETE CBC AUTOMATED: CPT | Performed by: INTERNAL MEDICINE

## 2022-04-03 PROCEDURE — 97162 PT EVAL MOD COMPLEX 30 MIN: CPT

## 2022-04-03 PROCEDURE — 99232 SBSQ HOSP IP/OBS MODERATE 35: CPT | Performed by: INTERNAL MEDICINE

## 2022-04-03 PROCEDURE — 82962 GLUCOSE BLOOD TEST: CPT

## 2022-04-03 PROCEDURE — 25010000002 PIPERACILLIN SOD-TAZOBACTAM PER 1 G

## 2022-04-03 PROCEDURE — 80053 COMPREHEN METABOLIC PANEL: CPT | Performed by: INTERNAL MEDICINE

## 2022-04-03 PROCEDURE — 85610 PROTHROMBIN TIME: CPT | Performed by: INTERNAL MEDICINE

## 2022-04-03 PROCEDURE — 25010000002 FUROSEMIDE PER 20 MG: Performed by: INTERNAL MEDICINE

## 2022-04-03 RX ORDER — FUROSEMIDE 10 MG/ML
200 INJECTION INTRAMUSCULAR; INTRAVENOUS EVERY 8 HOURS
Status: DISCONTINUED | OUTPATIENT
Start: 2022-04-03 | End: 2022-04-03

## 2022-04-03 RX ADMIN — PANTOPRAZOLE SODIUM 40 MG: 40 TABLET, DELAYED RELEASE ORAL at 09:39

## 2022-04-03 RX ADMIN — ASPIRIN 81 MG: 81 TABLET, CHEWABLE ORAL at 09:39

## 2022-04-03 RX ADMIN — SILVER SULFADIAZINE 1 APPLICATION: 10 CREAM TOPICAL at 20:59

## 2022-04-03 RX ADMIN — FUROSEMIDE 200 MG: 10 INJECTION, SOLUTION INTRAMUSCULAR; INTRAVENOUS at 19:23

## 2022-04-03 RX ADMIN — Medication 10 ML: at 09:39

## 2022-04-03 RX ADMIN — HYDRALAZINE HYDROCHLORIDE 25 MG: 25 TABLET ORAL at 14:00

## 2022-04-03 RX ADMIN — FERROUS SULFATE TAB EC 324 MG (65 MG FE EQUIVALENT) 324 MG: 324 (65 FE) TABLET DELAYED RESPONSE at 09:39

## 2022-04-03 RX ADMIN — Medication 1 CAPSULE: at 09:39

## 2022-04-03 RX ADMIN — HYDRALAZINE HYDROCHLORIDE 25 MG: 25 TABLET ORAL at 05:33

## 2022-04-03 RX ADMIN — LEVOTHYROXINE SODIUM 150 MCG: 150 TABLET ORAL at 09:38

## 2022-04-03 RX ADMIN — FUROSEMIDE 200 MG: 10 INJECTION, SOLUTION INTRAMUSCULAR; INTRAVENOUS at 11:49

## 2022-04-03 RX ADMIN — SILVER SULFADIAZINE 1 APPLICATION: 10 CREAM TOPICAL at 09:00

## 2022-04-03 RX ADMIN — HYDRALAZINE HYDROCHLORIDE 25 MG: 25 TABLET ORAL at 21:01

## 2022-04-03 RX ADMIN — AZITHROMYCIN MONOHYDRATE 250 MG: 250 TABLET ORAL at 09:39

## 2022-04-03 RX ADMIN — Medication 10 ML: at 20:58

## 2022-04-03 RX ADMIN — LIDOCAINE 1 PATCH: 50 PATCH CUTANEOUS at 20:57

## 2022-04-03 RX ADMIN — FERROUS SULFATE TAB EC 324 MG (65 MG FE EQUIVALENT) 324 MG: 324 (65 FE) TABLET DELAYED RESPONSE at 17:32

## 2022-04-03 RX ADMIN — TAZOBACTAM SODIUM AND PIPERACILLIN SODIUM 4.5 G: 500; 4 INJECTION, SOLUTION INTRAVENOUS at 16:00

## 2022-04-03 RX ADMIN — TAZOBACTAM SODIUM AND PIPERACILLIN SODIUM 4.5 G: 500; 4 INJECTION, SOLUTION INTRAVENOUS at 05:33

## 2022-04-03 RX ADMIN — Medication 10 ML: at 09:00

## 2022-04-03 RX ADMIN — GUAIFENESIN 600 MG: 600 TABLET, EXTENDED RELEASE ORAL at 20:58

## 2022-04-03 RX ADMIN — Medication 1 TABLET: at 09:39

## 2022-04-03 RX ADMIN — Medication 10 ML: at 10:02

## 2022-04-03 RX ADMIN — METOPROLOL SUCCINATE 100 MG: 100 TABLET, EXTENDED RELEASE ORAL at 09:39

## 2022-04-03 RX ADMIN — GUAIFENESIN 600 MG: 600 TABLET, EXTENDED RELEASE ORAL at 09:39

## 2022-04-03 RX ADMIN — Medication 3 ML: at 09:00

## 2022-04-03 RX ADMIN — Medication 1 CAPSULE: at 20:58

## 2022-04-03 RX ADMIN — ISOSORBIDE MONONITRATE 30 MG: 30 TABLET, EXTENDED RELEASE ORAL at 09:38

## 2022-04-03 RX ADMIN — DOCUSATE SODIUM 100 MG: 100 CAPSULE, LIQUID FILLED ORAL at 09:39

## 2022-04-03 NOTE — PLAN OF CARE
Goal Outcome Evaluation:  Plan of Care Reviewed With: patient, other (see comments) (pt's niece)           Outcome Evaluation: PT evaluation completed this date with pt alert and oriented x 4 as well as cooperative with assessment. Pt's niece present at beginning of the evaluation and post discussin with her neice, pt is now willing to have dialysis if needed as well as work with therapy with the goal of getting back to her home. Pt performed bed mobility with min assist to roll and max asssit + 2 PA for boosting up in bed. Pt did not think she she was able yet to sit on EOB so LE ther ex began as per flowsheet. Pt presents with deficits in strength and endurance as well balance. Pt is expected to improve her functional mobility with continued PT services prior to d/c.

## 2022-04-03 NOTE — PROGRESS NOTES
[facility]    Name:  Millicent Mckeon   Age:  61 y.o.  Sex:  female  :  1960  MRN:  [id number]   Visit Number:  [id number]  Admission Date:  3/30/2022  Date Of Service:  22  Primary Care Physician:  Marianela Albright APRN     LOS: 2 days :    Chief Complaint:      Generalized weakness.    Subjective:    Ms. Mckeon was seen and examined this morning.  She continues to improve with regards to her clinical status.  She is responding well to the diuretic therapy and had about 2600 mL of urine output in the last 24 hours.  Her renal function is slowly improving as well.  She is currently sitting up on the bed and coloring the books.  Denies any chest pain or shortness of breath.    Hospital Course:    Ms. Mckeon is a 61-year-old chronically ill lady with history of atrial fibrillation, chronic systolic heart failure, chronic kidney disease stage IV, chronic cor pulmonale, morbid obesity, functional quadriplegia who is wheelchair-bound was brought to the emergency room by EMS with symptoms of generalized weakness.  Apparently she was found at home disheveled, covered in various stages of skin sores and wounds.  She was discharged from this facility on 3/7/2022 after being treated for severe hypothermia likely environmental associated with hypoglycemia and bilateral lower extremity cellulitis.  Patient apparently lives alone after her sister passed away in 2021.       In the emergency room, she was afebrile with a pulse of 47 and blood pressure of 121/82 and saturating at 94% on room air.  Blood work done in the emergency room revealed a glucose of 40 with a creatinine of 3.74, BUN of 75, alkaline phosphate of 261 and a hemoglobin of 10.8.  Her proBNP level was 17,302 but troponin was negative.  Chest x-ray showed cardiomegaly with bilateral homogeneous opacity in the lower zones.  Patient was given IV dextrose 1 ampoule in the emergency room.  She was also  thought to have bilateral pneumonia and was placed on Rocephin and azithromycin and was admitted to the medical floor with telemetry.  Unfortunately after admission she developed low normal blood pressures and hypothermia.  She was also drowsy.  She was seen by Dr. Cutler and he started her on midodrine and IV Lasix.  A Wilkinson catheter was placed.  Due to suspicion of sepsis, and recent hospitalization for antibody coverage was escalated to Zosyn and vancomycin.  Due to poor IV access, Dr. Guadalupe placed a left internal jugular central venous line on 3/31/2022.  With broadening of the IV antibiotics therapy, midodrine, her blood pressure improved.  She was continued on IV diuretic therapy.  Once her nasal swab for MRSA came back negative, her vancomycin was discontinued.  Patient improved slowly with regards to her urine output.  She was seen by behavioral health services and she declined any help and denied depression.  She was seen by palliative care services.  Patient does not want dialysis and wants to be DNR.  She wants to go home and be comfortable when discharged from the hospital.  She has refused to go to short-term rehabilitation on multiple occasions.    Patient was continued on diuretic therapy with good response and good diuresis.  Her clinical condition as well as the bilateral lower extremity cellulitis improved.    Review of Systems:     All systems were reviewed and negative except as mentioned in subjective, assessment and plan.    Vital Signs:    Temp:  [98.1 °F (36.7 °C)-98.6 °F (37 °C)] 98.1 °F (36.7 °C)  Heart Rate:  [79-90] 88  Resp:  [18-20] 18  BP: (118-132)/(69-83) 127/75    Intake and output:    I/O last 3 completed shifts:  In: 1796 [P.O.:1080; I.V.:716]  Out: 4575 [Urine:4575]  I/O this shift:  In: 480 [P.O.:480]  Out: -     Physical Examination:    General Appearance:  Alert and cooperative. Not in any distress.   Head:  Atraumatic and normocephalic.   Eyes: Conjunctivae and sclerae  normal, no icterus. No pallor.   Throat: No oral lesions, no thrush, oral mucosa moist.   Neck: Supple, trachea midline, no thyromegaly.  Left internal jugular central venous line noted.   Lungs:   Breath sounds decreased bilaterally in the bases.  No wheezing.  Bilateral scattered crackles heard. No Pleural rub or bronchial breathing.   Heart:  Normal S1 and S2, no murmur, no gallop, no rub. No JVD.   Abdomen:   Normal bowel sounds, no masses, no organomegaly. Soft, nontender, obese with a large pannus, no rebound tenderness.  Wilkinson catheter is in place.   Extremities: Supple, peripheral cyanosis in the toes, no clubbing.  3+ pitting edema noted all the way up to her thighs with increased erythema in the lower extremities associated with multiple skin ulcerations and scabs-this seems to have significantly improved since admission.   Skin: No bleeding.  Multiple ulcers noted in the lower extremity with chronic erythema and venous stasis changes.   Neurologic: Alert and oriented x 3. No facial asymmetry. Moves all four limbs but does have generalized weakness. No tremors.      Laboratory results:    Results from last 7 days   Lab Units 04/03/22  0604 04/02/22  0453 04/01/22  0421 03/31/22  0541 03/30/22  1534   SODIUM mmol/L 141 141 140   < > 142   POTASSIUM mmol/L 4.5 4.6 5.4*   < > 4.5   CHLORIDE mmol/L 97* 98 99   < > 100   CO2 mmol/L 21.2* 18.1* 19.4*   < > 24.2   BUN mg/dL 101* 94* 87*   < > 75*   CREATININE mg/dL 4.69* 4.74* 4.46*   < > 3.74*   CALCIUM mg/dL 7.9* 8.1* 7.9*   < > 8.0*   BILIRUBIN mg/dL 0.7  --   --   --  0.7   ALK PHOS U/L 250*  --   --   --  261*   ALT (SGPT) U/L 73*  --   --   --  17   AST (SGOT) U/L 77*  --   --   --  22   GLUCOSE mg/dL 251* 132* 76   < > 40*    < > = values in this interval not displayed.     Results from last 7 days   Lab Units 04/03/22  0604 04/02/22  0453 03/31/22  0541   WBC 10*3/mm3 6.00 6.23 5.67   HEMOGLOBIN g/dL 9.7* 10.2* 10.3*   HEMATOCRIT % 31.8* 34.1 32.9*    PLATELETS 10*3/mm3 77* 88* 107*     Results from last 7 days   Lab Units 04/03/22  0604 04/02/22  0453 03/31/22  1343   INR  1.84* 3.30* 2.54*     Results from last 7 days   Lab Units 03/30/22  1534   TROPONIN T ng/mL 0.025     I have reviewed the patient's laboratory results.    Radiology results:    No radiology results from the last 24 hrs    Medication Review:     I have reviewed the patient's active and prn medications.     Problem List:      Hypoglycemia    Bilateral edema of lower extremity    Essential hypertension    Acquired hypothyroidism    Type 2 diabetes mellitus with nephropathy (HCC)    Cellulitis of both lower extremities    Anemia due to stage 4 chronic kidney disease (HCC)    Chronic diastolic congestive heart failure (HCC)    Cor pulmonale, chronic (HCC)    Chronic kidney disease, stage IV (severe) ([facility])    Bradycardia    Assessment:    1. Sepsis with generalized weakness, hypothermia and hypotension secondary to #2, resolved.  2. Bilateral lower extremity cellulitis, POA, improving.  3. Acute hypoglycemia, POA, resolved.  4. Severe bradycardia possibly related to medications in the setting of worsening renal failure, resolved.  5. Progressive worsening of chronic kidney disease stage IV with volume overload, POA, improving.  6. Chronic venous stasis of the lower extremities with multiple skin ulcers, POA..  7. Chronic systolic heart failure with ejection fraction of 35%.  8. Paroxysmal atrial fibrillation on apixaban.  9. Chronic cor pulmonale.  10. Chronic hypoxic respiratory failure on home oxygen.  11. Diabetes mellitus type 2 with nephropathy.  12. Morbid obesity with a BMI of 57.  13. Anemia of chronic kidney disease.  14. Functional quadriplegia.  15. Acquired hypothyroidism.    Plan:    Sepsis/hypothermia/hypotension.  -This is likely secondary to bilateral lower extremity cellulitis that was present on admission.  -Continue IV antibiotics therapy with Zosyn (day 3).  -Nasal swab for  MRSA was negative. Blood cultures have been negative so far.  -Wound care services have seen the patient for bilateral lower extremity cellulitis/ulcerations and with the local care and IV antibiotics therapy her cellulitis is improving.    Generalized weakness/hypoglycemia/bradycardia.    -Unfortunately, patient has multiple comorbidities and has progressive decline in her health in the last several months.  Her current symptomatology is likely due to a combination of hypoglycemia, bradycardia, worsening renal failure and congestive heart failure.  -Bradycardia has improved and her Toprol-XL has been restarted.  -We will continue to hold amiodarone.    Progressive worsening of chronic kidney disease stage IV.  -She is responding to diuretic therapy very well and having good diuresis.  -Her creatinine is slowly improving as well.  -I have discussed the patient's condition and treatment plan with Dr. Leone.  -We will continue IV Lasix.    Patient is being followed by palliative care services.    Discussed with nursing staff.    DVT Prophylaxis: Apixaban  Code Status: DNR/DNI  Diet: Diabetic renal  Discharge Plan: Pending-SNF with hospice versus home with hospice.      Samson Reyes MD  04/03/22  11:26 EDT    Dictated utilizing Dragon dictation.

## 2022-04-03 NOTE — THERAPY EVALUATION
Patient Name: Millicent Mckeon  : 1960    MRN: [id number]                              Today's Date: 4/3/2022       Admit Date: 3/30/2022    Visit Dx:     ICD-10-CM ICD-9-CM   1. Hypoglycemia  E16.2 251.2   2. Bradycardia  R00.1 427.89   3. Failure to thrive in adult  R62.7 783.7   4. Pneumonia of right lung due to infectious organism, unspecified part of lung  J18.9 483.8     Patient Active Problem List   Diagnosis   • Altered mental status   • Bilateral edema of lower extremity   • Cellulitis of lower extremity   • Acute on chronic renal failure (HCC)   • GERD (gastroesophageal reflux disease)   • Hyperkalemia   • Essential hypertension   • Acquired hypothyroidism   • Type 2 diabetes mellitus with nephropathy ([facility]ly [facility])   • Vitamin B12 deficiency   • Cellulitis of both lower extremities   • Anemia due to stage 4 chronic kidney disease ([facility])   • Acute renal failure (ARF) (Formerly [facility])   • Hyperkalemia   • Impaired functional mobility and activity tolerance   • Chronic diastolic congestive heart failure ([facility])   • Paroxysmal atrial fibrillation with rapid ventricular response ([facility])   • Pulmonary hypertension ([facility])   • Cor pulmonale, chronic ([facility])   • Chronic venous hypertension involving both sides   • Lymphedema of both lower extremities   • Obesity, morbid, BMI 50 or higher ([facility]ly [facility])   • A-fib ([facility]ly [facility])   • CKD (chronic kidney disease) stage 3, GFR 30-59 ml/min ([facility])   • Acute bronchitis due to other specified organisms   • Tachycardia-bradycardia syndrome ([facility])   • Pressure ulcer of right heel, unstageable ([facility])   • Mucopurulent chronic bronchitis ([facility])   • Acute pulmonary edema ([facility])   • Thrombocytopenia, unspecified ([facility])   • Chronic kidney disease, stage IV (severe) ([facility])   • Hypoglycemia   • Bradycardia     Past Medical History:   Diagnosis Date   • A-fib ([facility])    • Anemia 10/2/2018   • Diabetes mellitus ([facility])    • Disease of thyroid gland    • GERD (gastroesophageal reflux disease)    • Gout    • History of transfusion     • Hypertension      Past Surgical History:   Procedure Laterality Date   • EYE SURGERY     • INCISION AND DRAINAGE LEG Right 1/14/2019    Procedure: Right heel incision and drainage with graft application;  Surgeon: Ar Rueda DPM;  Location: [facility];  Service: Podiatry   • LEG SURGERY        General Information     Row Name 04/03/22 1011          Physical Therapy Time and Intention    Document Type evaluation  -TW     Mode of Treatment physical therapy  -TW     Row Name 04/03/22 1011          General Information    Patient Profile Reviewed yes  -TW     Prior Level of Function independent:;transfer  Upon d/c home pt was only transferring from bed to BSC only. Home health was not yet initiated. Pt was home alone.  -TW     Existing Precautions/Restrictions fall  -TW     Barriers to Rehab medically complex;previous functional deficit;ineffective coping;impaired sensation  -TW     Row Name 04/03/22 1011          Living Environment    People in Home alone  -TW     Row Name 04/03/22 1011          Home Main Entrance    Number of Stairs, Main Entrance none  -TW     Row Name 04/03/22 1011          Stairs Within Home, Primary    Number of Stairs, Within Home, Primary none  -TW     Row Name 04/03/22 1011          Cognition    Orientation Status (Cognition) oriented x 4  -TW     Row Name 04/03/22 1011          Safety Issues, Functional Mobility    Safety Issues Affecting Function (Mobility) friction/shear risk;insight into deficits/self-awareness;safety precaution awareness;safety precautions follow-through/compliance;sequencing abilities  -TW     Impairments Affecting Function (Mobility) balance;endurance/activity tolerance;strength;sensation/sensory awareness  -TW     Comment, Safety Issues/Impairments (Mobility) Pt is oriented x 4 but does not aknowledge that her current physical condition limits her mobility and safety for being alone at home.  -TW           User Key  (r) = Recorded By, (t) = Taken By, (c) =  Cosigned By    Initials Name Provider Type    TW Xochitl Bradford PT Physical Therapist               Mobility     Row Name 04/03/22 1011          Bed Mobility    Bed Mobility rolling right;rolling left;scooting/bridging  -TW     Rolling Left [place] (Bed Mobility) minimum assist (75% patient effort);verbal cues  -TW     Rolling Right [place] (Bed Mobility) minimum assist (75% patient effort);verbal cues  -TW     Scooting/Bridging [place] (Bed Mobility) maximum assist (25% patient effort);2 person assist  -TW     Assistive Device (Bed Mobility) bed rails;draw sheet  -TW     Comment, (Bed Mobility) Pt did assist with rolling with UE's by pulling on bed rail. Pt's LE assist was limited. Pt has bandages on B lower legs/calves with weeping noted. Pt declined to sit on EOB post having bath and bed change due to fatigue.  -[facility] Name 04/03/22 1011          Bed-Chair Transfer    Bed-Chair [place] (Transfers) unable to assess  -TW     Row Name 04/03/22 1011          Sit-Stand Transfer    Sit-Stand [place] (Transfers) unable to assess  -[facility] Name 04/03/22 1011          Gait/Stairs (Locomotion)    [place] Level (Gait) unable to assess  -           User Key  (r) = Recorded By, (t) = Taken By, (c) = Cosigned By    Initials Name Provider Type    Xochitl Barakat PT Physical Therapist               Obj/Interventions     Row Name 04/03/22 1011          Range of Motion Comprehensive    Comment, General Range of Motion B knee flexion is limited due to edema.  -[facility] Name 04/03/22 1011          Strength Comprehensive (MMT)    Comment, General Manual Muscle Testing (MMT) Assessment BLE strength grossly 3-/4 to 3+/5  -     Row Name 04/03/22 1011          Motor Skills    Therapeutic Exercise hip;knee;ankle  Therapist did begin LE ther ex with pt including glut sets, hip add sets, SAQs, and assisted heelslides and hip abd all 1 x 10. Ankle pumps encouraged and pt able to demonstrate  -TW            User Key  (r) = Recorded By, (t) = Taken By, (c) = Cosigned By    Initials Name Provider Type    TW Suzette, Xochitl, PT Physical Therapist               Goals/Plan     Row Name 04/03/22 1011          Bed Mobility Goal 1 (PT)    Activity/Assistive Device (Bed Mobility Goal 1, PT) bed mobility activities, all  -TW     [place] Level/Cues Needed (Bed Mobility Goal 1, PT) standby assist  -TW     Time Frame (Bed Mobility Goal 1, PT) 2 weeks  -TW     Progress/Outcomes (Bed Mobility Goal 1, PT) goal ongoing  -TW     Row Name 04/03/22 1011          Transfer Goal 1 (PT)    Activity/Assistive Device (Transfer Goal 1, PT) sit-to-stand/stand-to-sit;bed-to-chair/chair-to-bed;toilet;walker, rolling  -TW     [place] Level/Cues Needed (Transfer Goal 1, PT) contact guard required;verbal cues required  -TW     Time Frame (Transfer Goal 1, PT) 2 weeks  -TW     Progress/Outcome (Transfer Goal 1, PT) goal ongoing  -TW     Row Name 04/03/22 1011          Gait Training Goal 1 (PT)    Activity/Assistive Device (Gait Training Goal 1, PT) gait (walking locomotion);assistive device use;decrease fall risk;increase endurance/gait distance;walker, rolling  -TW     [place] Level (Gait Training Goal 1, PT) minimum assist (75% or more patient effort)  -TW     Distance (Gait Training Goal 1, PT) 20 ft  -TW     Time Frame (Gait Training Goal 1, PT) 2 weeks  -TW     Progress/Outcome (Gait Training Goal 1, PT) goal ongoing  -TW     Row Name 04/03/22 1011          Problem Specific Goal 1 (PT)    Problem Specific Goal 1 (PT) Pt to sit on EOB x 5 min with supervision only  -TW     Time Frame (Problem Specific Goal 1, PT) short-term goal (STG);5 days  -TW     Progress/Outcome (Problem Specific Goal 1, PT) goal ongoing  -TW     Row Name 04/03/22 1011          Patient Education Goal (PT)    Activity (Patient Education Goal, PT) LE ther ex 1 x 15  -TW     [place]/Cues/Accuracy (Memory Goal 2, PT) demonstrates adequately  -TW     Time  Frame (Patient Education Goal, PT) 2 weeks  -TW     Progress/Outcome (Patient Education Goal, PT) goal ongoing  -TW           User Key  (r) = Recorded By, (t) = Taken By, (c) = Cosigned By    Initials Name Provider Type    TW Xochitl Bradford, MIC Physical Therapist               Clinical Impression     Row Name 04/03/22 1011          Pain    Pretreatment Pain Rating 0/10 - no pain  -TW     Posttreatment Pain Rating 0/10 - no pain  -TW     Row Name 04/03/22 1011          Plan of Care Review    Plan of Care Reviewed With patient;other (see comments)  pt's niece  -TW     Outcome Evaluation PT evaluation completed this date with pt alert and oriented x 4 as well as cooperative with assessment. Pt's niece present at beginning of the evaluation and post discussin with her neice, pt is now willing to have dialysis if needed as well as work with therapy with the goal of getting back to her home. Pt performed bed mobility with min assist to roll and max asssit + 2 PA for boosting up in bed. Pt did not think she she was able yet to sit on EOB so LE ther ex began as per flowsheet. Pt presents with deficits in strength and endurance as well balance. Pt is expected to improve her functional mobility with continued PT services prior to d/c.  -TW     Row Name 04/03/22 1011          Therapy Assessment/Plan (PT)    Patient/Family Therapy Goals Statement (PT) to return to her home when able.  -TW     Rehab Potential (PT) fair, will monitor progress closely  -TW     Criteria for Skilled Interventions Met (PT) yes;meets criteria  -TW     Predicted Duration of Therapy Intervention (PT) 2 wks  -TW     Row Name 04/03/22 1011          Vital Signs    O2 Delivery Pre Treatment room air  -TW     Pre Patient Position Supine  -TW     Intra Patient Position Side Lying  -TW     Post Patient Position Supine  -TW     Row Name 04/03/22 1011          Positioning and Restraints    Pre-Treatment Position in bed  -TW     Post Treatment Position bed  -TW      In Bed notified nsg;supine;call light within reach;encouraged to call for assist;exit alarm on;heels elevated  -TW           User Key  (r) = Recorded By, (t) = Taken By, (c) = Cosigned By    Initials Name Provider Type    Xochitl Barakat PT Physical Therapist               Outcome Measures     Row Name 04/03/22 1011          How much help from another person do you currently need...    Turning from your back to your side while in flat bed without using bedrails? 3  -TW     Moving from lying on back to sitting on the side of a flat bed without bedrails? 2  -TW     Moving to and from a bed to a chair (including a wheelchair)? 1  -TW     Standing up from a chair using your arms (e.g., wheelchair, bedside chair)? 1  -TW     Climbing 3-5 steps with a railing? 1  -TW     To walk in hospital room? 1  -TW     AM-PAC 6 Clicks Score (PT) 9  -TW     Row Name 04/03/22 1011          Functional Assessment    Outcome Measure Options AM-PAC 6 Clicks Basic Mobility (PT)  -TW           User Key  (r) = Recorded By, (t) = Taken By, (c) = Cosigned By    Initials Name Provider Type    Xochitl Barakat PT Physical Therapist                             Physical Therapy Education                 Title: PT OT SLP Therapies (In Progress)     Topic: Physical Therapy (In Progress)     Point: Mobility training (Done)     Learning Progress Summary           Patient Acceptance, E,D, DU,NR by TW at 4/3/2022 1011    Comment: Pt education for improving bed mobility technique as well as LE ther ex technique.                   Point: Home exercise program (Done)     Learning Progress Summary           Patient Acceptance, E,D, DU,NR by TW at 4/3/2022 1011    Comment: Pt education for improving bed mobility technique as well as LE ther ex technique.                   Point: Body mechanics (Not Started)     Learner Progress:  Not documented in this visit.          Point: Precautions (Not Started)     Learner Progress:  Not documented in this visit.                       User Key     Initials Effective Dates Name Provider Type Discipline    TW 06/16/21 -  Xochitl Bradford PT Physical Therapist PT              PT Recommendation and Plan  Planned Therapy Interventions (PT): balance training, bed mobility training, gait training, patient/family education, transfer training, strengthening  Plan of Care Reviewed With: patient, other (see comments) (pt's niece)  Outcome Evaluation: PT evaluation completed this date with pt alert and oriented x 4 as well as cooperative with assessment. Pt's niece present at beginning of the evaluation and post discussin with her neice, pt is now willing to have dialysis if needed as well as work with therapy with the goal of getting back to her home. Pt performed bed mobility with min assist to roll and max asssit + 2 PA for boosting up in bed. Pt did not think she she was able yet to sit on EOB so LE ther ex began as per flowsheet. Pt presents with deficits in strength and endurance as well balance. Pt is expected to improve her functional mobility with continued PT services prior to d/c.     Time Calculation:    PT Charges     Row Name 04/03/22 1011             Time Calculation    Stop Time 1011  -TW      PT Received On 04/03/22  -TW      PT Goal Re-Cert Due Date 04/13/22 -TW            User Key  (r) = Recorded By, (t) = Taken By, (c) = Cosigned By    Initials Name Provider Type    TW Xochitl Bradford PT Physical Therapist              Therapy Charges for Today     Code Description Service Date Service Provider Modifiers Qty    91340052279 HC PT EVAL MOD COMPLEXITY 3 4/3/2022 Xochitl Bradford PT GP 1          PT G-Codes  Outcome Measure Options: AM-PAC 6 Clicks Basic Mobility (PT)  AM-PAC 6 Clicks Score (PT): 9    Xochitl Bradford PT  4/3/2022

## 2022-04-03 NOTE — PLAN OF CARE
Goal Outcome Evaluation:  Plan of Care Reviewed With: patient        Progress: no change  Outcome Evaluation: VSS, has been awake most of shift, denies complaints, maintaining o2 sats >90% on RA, wound care given as ordered. no acute events this shift

## 2022-04-03 NOTE — PROGRESS NOTES
[facility] Progress Note  . [place]        Patient Name: Millicent Mckeon  : 1960  MRN: [id number]   LOS: 2 days    Patient Care Team:  Marianela Albright APRN as PCP - General (Family Medicine)  Geremias Shepherd MD as Consulting Physician (General Surgery)  Lisa Cardoso MD as Surgeon (General Surgery)    Chief Complaint:    Chief Complaint   Patient presents with   • Weakness - Generalized     Unable to stand     Primary Care Physician:  Marianela Albright APRN  Date of admission: 3/30/2022    Subjective     Interval History:   Events noted from last 24 hours.  Currently she does appears to be doing better, lot more alert and interactive.  Today she again said that she does not want dialysis.  When I asked her about Val who is the POA, she said she was here today and had discussion with her.  Although she denies that she discuss anything about dialysis.  She said if she needs dialysis she will have to do it.  Although it does not appear that she needs it today but most likely will end up requiring dialysis this admission.  I will let them discussed this again.  She denies having any chest pain or shortness of breath.  No fever.  She keeps on repeating that she wants to go home.    Review of Systems:   As noted above    Objective     Vitals:   Temp:  [96.7 °F (35.9 °C)-98.6 °F (37 °C)] 98.1 °F (36.7 °C)  Heart Rate:  [79-90] 80  Resp:  [18-20] 18  BP: (118-132)/(69-83) 120/69  Flow (L/min):  [2] 2    Intake/Output Summary (Last 24 hours) at 4/3/2022 0943  Last data filed at 4/3/2022 0900  Gross per 24 hour   Intake 953 ml   Output 2600 ml   Net -1647 ml       Physical Exam:    General Appearance: alert,  no acute distress   Skin: warm and dry  HEENT: oral mucosa normal, nonicteric sclera  Neck: supple, no JVD  Lungs: CTA  Heart: RRR, normal S1 and S2  Abdomen: Obese, soft to firm, nontender, nondistended  : no palpable bladder  Extremities: 2-3+ edema,  mostly it is dependent edema, no cyanosis or clubbing  Neuro: He is able to communicate and randomly moves extremities.    Scheduled Meds:     Current Facility-Administered Medications   Medication Dose Route Frequency Provider Last Rate Last Admin   • acetaminophen (TYLENOL) tablet 650 mg  650 mg Oral Q4H PRN Samson Reyes MD   650 mg at 03/31/22 0629    Or   • acetaminophen (TYLENOL) 160 MG/5ML solution 650 mg  650 mg Oral Q4H PRN Samson Reyes MD        Or   • acetaminophen (TYLENOL) suppository 650 mg  650 mg Rectal Q4H PRN Samson Reyes MD       • aspirin chewable tablet 81 mg  81 mg Oral Daily Samson Reyes MD   81 mg at 04/03/22 0939   • b complex-vitamin c-folic acid (NEPHRO-LOIS) tablet 1 tablet  1 tablet Oral Daily Samson Reyes MD   1 tablet at 04/03/22 0939   • dextrose (D50W) (25 g/50 mL) IV injection 25 g  25 g Intravenous Q15 Min PRN Samson Reyes MD       • dextrose (D50W) (25 g/50 mL) IV injection 50 mL  50 mL Intravenous Q1H PRN Samson Reyes MD   50 mL at 03/30/22 2159   • dextrose (D50W) (25 g/50 mL) IV injection 50 mL  50 mL Intravenous Q1H PRN Jerry Vargas PA-C   50 mL at 03/31/22 0630   • dextrose (GLUTOSE) oral gel 1 tube  1 tube Oral Q15 Min PRN Samson Reyes MD       • docusate sodium (COLACE) capsule 100 mg  100 mg Oral BID Samson Reyes MD   100 mg at 04/03/22 0939   • ferrous sulfate EC tablet 324 mg  324 mg Oral BID With Meals Samson Reyes MD   324 mg at 04/03/22 0939   • glucagon (human recombinant) (GLUCAGEN DIAGNOSTIC) injection 1 mg  1 mg Subcutaneous PRN Samson Reyes MD       • guaiFENesin (MUCINEX) 12 hr tablet 600 mg  600 mg Oral Q12H Samson Reyes MD   600 mg at 04/03/22 0939   • hydrALAZINE (APRESOLINE) tablet 25 mg  25 mg Oral Q8H Milad Leone MD   25 mg at 04/03/22 0533   • ipratropium-albuterol (DUO-NEB) nebulizer solution 3 mL  3 mL Nebulization Q4H PRN Samson Reyes MD       • isosorbide mononitrate (IMDUR) 24 hr tablet 30 mg  30 mg Oral Daily Amy  MD Samson   30 mg at 04/03/22 0938   • lactobacillus acidophilus (RISAQUAD) capsule 1 capsule  1 capsule Oral BID Samson Reyes MD   1 capsule at 04/03/22 0939   • levothyroxine (SYNTHROID, LEVOTHROID) tablet 150 mcg  150 mcg Oral Daily Samson Reyes MD   150 mcg at 04/03/22 0938   • lidocaine (LIDODERM) 5 % 1 patch  1 patch Transdermal Q24H Samson Reyes MD   1 patch at 04/02/22 2017   • metoprolol succinate XL (TOPROL-XL) 24 hr tablet 100 mg  100 mg Oral Q24H Samson Reyes MD   100 mg at 04/03/22 0939   • ondansetron (ZOFRAN) injection 4 mg  4 mg Intravenous Q6H PRN Samson Reyes MD       • pantoprazole (PROTONIX) EC tablet 40 mg  40 mg Oral Daily Samson Reyes MD   40 mg at 04/03/22 0939   • Pharmacy to Dose Zosyn   Does not apply Continuous PRN Samson Reyes MD       • piperacillin-tazobactam (ZOSYN) 4.5 g in iso-osmotic dextrose 100 mL IVPB (premix)  4.5 g Intravenous Q12H [facility]   4.5 g at 04/03/22 0533   • silver sulfadiazine (SILVADENE, SSD) 1 % cream 1 application  1 application Topical Q12H Samson Reyes MD   1 application at 04/02/22 2034   • sodium chloride 0.9 % flush 10 mL  10 mL Intravenous PRN Samson Reyes MD       • sodium chloride 0.9 % flush 10 mL  10 mL Intravenous Q12H Samson Reyes MD   10 mL at 04/02/22 0905   • sodium chloride 0.9 % flush 10 mL  10 mL Intravenous Q12H Samson Reyes MD   10 mL at 04/02/22 2018   • sodium chloride 0.9 % flush 10 mL  10 mL Intravenous Q12H Samson Reyes MD   10 mL at 04/03/22 0939   • sodium chloride 0.9 % flush 10 mL  10 mL Intravenous PRN Samson Reyes MD       • sodium chloride 0.9 % flush 20 mL  20 mL Intravenous PRN Samson Reyes MD       • sodium chloride 0.9 % flush 3 mL  3 mL Intravenous Q12H Samson Reyes MD   3 mL at 04/02/22 0906   • sodium chloride 0.9 % flush 3-10 mL  3-10 mL Intravenous PRN Samson Reyes MD           aspirin, 81 mg, Oral, Daily  b complex-vitamin c-folic acid, 1 tablet, Oral, Daily  docusate sodium, 100 mg, Oral,  BID  ferrous sulfate, 324 mg, Oral, BID With Meals  guaiFENesin, 600 mg, Oral, Q12H  hydrALAZINE, 25 mg, Oral, Q8H  isosorbide mononitrate, 30 mg, Oral, Daily  lactobacillus acidophilus, 1 capsule, Oral, BID  levothyroxine, 150 mcg, Oral, Daily  lidocaine, 1 patch, Transdermal, Q24H  metoprolol succinate XL, 100 mg, Oral, Q24H  pantoprazole, 40 mg, Oral, Daily  piperacillin-tazobactam, 4.5 g, Intravenous, Q12H  silver sulfadiazine, 1 application, Topical, Q12H  sodium chloride, 10 mL, Intravenous, Q12H  sodium chloride, 10 mL, Intravenous, Q12H  sodium chloride, 10 mL, Intravenous, Q12H  sodium chloride, 3 mL, Intravenous, Q12H        IV Meds:   Pharmacy to Dose Zosyn,         Results Reviewed:   I have personally reviewed the results from the time of this admission to 4/3/2022 09:43 EDT     Results from last 7 days   Lab Units 04/03/22  0604 04/02/22  0453 04/01/22  0421 03/31/22  0541 03/30/22  1534   SODIUM mmol/L 141 141 140   < > 142   POTASSIUM mmol/L 4.5 4.6 5.4*   < > 4.5   CHLORIDE mmol/L 97* 98 99   < > 100   CO2 mmol/L 21.2* 18.1* 19.4*   < > 24.2   BUN mg/dL 101* 94* 87*   < > 75*   CREATININE mg/dL 4.69* 4.74* 4.46*   < > 3.74*   CALCIUM mg/dL 7.9* 8.1* 7.9*   < > 8.0*   BILIRUBIN mg/dL 0.7  --   --   --  0.7   ALK PHOS U/L 250*  --   --   --  261*   ALT (SGPT) U/L 73*  --   --   --  17   AST (SGOT) U/L 77*  --   --   --  22   GLUCOSE mg/dL 251* 132* 76   < > 40*    < > = values in this interval not displayed.       Estimated Creatinine Clearance: 18.5 mL/min (A) (by C-G formula based on SCr of 4.69 mg/dL (H)).    Results from last 7 days   Lab Units 04/02/22  0453 04/01/22  0421 03/30/22  1534   MAGNESIUM mg/dL  --   --  2.5*   PHOSPHORUS mg/dL 6.6* 6.7*  --              Results from last 7 days   Lab Units 04/03/22  0604 04/02/22  0453 03/31/22  0541 03/30/22  1534   WBC 10*3/mm3 6.00 6.23 5.67 4.54   HEMOGLOBIN g/dL 9.7* 10.2* 10.3* 10.8*   PLATELETS 10*3/mm3 77* 88* 107* 106*       Results from last  7 days   Lab Units 04/03/22  0604 04/02/22  0453 03/31/22  1343   INR  1.84* 3.30* 2.54*       Brief Urine Lab Results  (Last result in the past 365 days)      Color   Clarity   Blood   Leuk Est   Nitrite   Protein   CREAT   Urine HCG        03/30/22 1705 Yellow   Clear   Trace   Negative   Negative   >=300 mg/dL (3+)                 No results found for: UTPCR    Imaging Results (Last 24 Hours)     ** No results found for the last 24 hours. **              Assessment / Plan     ASSESSMENT:    Acute kidney injury: It is quite likely that she has worsening renal function secondary to her baseline worsening of renal function, it is quite possible that she has not been taking her medications in has worsening cardiorenal syndrome.  I will go ahead and optimize the medications and see if we can get some improvement in her renal function.  If no improvement her only option will be to start renal replacement therapy or go to in center hospice for comfort care only.    Chronic kidney disease, stage IV (severe) ([facility]): Baseline diabetic and hypertensive nephropathy with likely combination of cardiorenal syndrome.    Anemia due to stage 4 chronic kidney disease (HCC): Hemoglobin appears to be fairly stable.    Chronic diastolic congestive heart failure (HCC): We will try to diurese with high-dose diuretics and see if she will respond to it.    Cor pulmonale, chronic (HCC): Longstanding history of untreated sleep apnea might be contributing.    Hypoglycemia: Oral hypoglycemic agents with prolonged effect from renal failure likely the cause.    Bilateral edema of lower extremity: She does not have much lower extremity edema most of the edema is in the abdominal and chest area appears to be having generalized anasarca.    Essential hypertension: Blood pressure is on the low side we will hold off all blood pressure medications we will go ahead and start her on midodrine to maintain blood pressure while aggressively diuresing  her.    Acquired hypothyroidism: Continue home Synthroid dose.    Type 2 diabetes mellitus with nephropathy (HCC): As per hospitalist service.    Cellulitis of both lower extremities: IV antibiotics have been started.    Bradycardia: Currently bradycardic will hold beta-blockers for now.      PLAN:  Renal function is slightly worse, increasing urine output noted.  She still has fairly significant edema we will continue IV diuretics will change Lasix to 200 mg IV every 8 hours x 2 doses.  No acute indications for dialysis today.  Serum creatinine is slightly better.  Continue to optimize medications.  Details were discussed with the patient no family.    Details were also discussed with the hospitalist service as well as nursing staff.  Continue with rest of the current treatment plan, and monitor with surveillance labs.  Further recommendations will depend on clinical course of the patient during the current hospitalization.     Thank you for involving us in the care of Millicent Mckeon.  Please feel free to call with any questions.    Milad Leone MD  04/03/22  09:43 EDT    [facility]  [phone]      Much of this encounter note is an electronic transcription/translation of spoken language to printed text. The electronic translation of spoken language may permit erroneous, or at times, nonsensical words or phrases to be inadvertently transcribed; Although I have reviewed the note for such errors, some may still exist.

## 2022-04-04 LAB
ALBUMIN SERPL-MCNC: 3.5 G/DL (ref 3.5–5.2)
ALBUMIN/GLOB SERPL: 1 G/DL
ALP SERPL-CCNC: 225 U/L (ref 39–117)
ALT SERPL W P-5'-P-CCNC: 81 U/L (ref 1–33)
ANION GAP SERPL CALCULATED.3IONS-SCNC: 18.8 MMOL/L (ref 5–15)
ANISOCYTOSIS BLD QL: NORMAL
AST SERPL-CCNC: 61 U/L (ref 1–32)
BACTERIA SPEC AEROBE CULT: NORMAL
BACTERIA SPEC AEROBE CULT: NORMAL
BASOPHILS # BLD AUTO: 0.04 10*3/MM3 (ref 0–0.2)
BASOPHILS NFR BLD AUTO: 0.7 % (ref 0–1.5)
BILIRUB SERPL-MCNC: 0.6 MG/DL (ref 0–1.2)
BUN SERPL-MCNC: 97 MG/DL (ref 8–23)
BUN/CREAT SERPL: 19.3 (ref 7–25)
CALCIUM SPEC-SCNC: 7.6 MG/DL (ref 8.6–10.5)
CHLORIDE SERPL-SCNC: 95 MMOL/L (ref 98–107)
CO2 SERPL-SCNC: 23.2 MMOL/L (ref 22–29)
CREAT SERPL-MCNC: 5.03 MG/DL (ref 0.57–1)
DEPRECATED RDW RBC AUTO: 54.7 FL (ref 37–54)
EGFRCR SERPLBLD CKD-EPI 2021: 9.1 ML/MIN/1.73
ELLIPTOCYTES BLD QL SMEAR: NORMAL
EOSINOPHIL # BLD AUTO: 0.21 10*3/MM3 (ref 0–0.4)
EOSINOPHIL NFR BLD AUTO: 3.5 % (ref 0.3–6.2)
ERYTHROCYTE [DISTWIDTH] IN BLOOD BY AUTOMATED COUNT: 21.5 % (ref 12.3–15.4)
GLOBULIN UR ELPH-MCNC: 3.6 GM/DL
GLUCOSE BLDC GLUCOMTR-MCNC: 242 MG/DL (ref 70–130)
GLUCOSE BLDC GLUCOMTR-MCNC: 249 MG/DL (ref 70–130)
GLUCOSE BLDC GLUCOMTR-MCNC: 380 MG/DL (ref 70–130)
GLUCOSE BLDC GLUCOMTR-MCNC: 422 MG/DL (ref 70–130)
GLUCOSE SERPL-MCNC: 428 MG/DL (ref 65–99)
HCT VFR BLD AUTO: 32.2 % (ref 34–46.6)
HGB BLD-MCNC: 10 G/DL (ref 12–15.9)
HYPOCHROMIA BLD QL: NORMAL
IMM GRANULOCYTES # BLD AUTO: 0.05 10*3/MM3 (ref 0–0.05)
IMM GRANULOCYTES NFR BLD AUTO: 0.8 % (ref 0–0.5)
LYMPHOCYTES # BLD AUTO: 0.53 10*3/MM3 (ref 0.7–3.1)
LYMPHOCYTES NFR BLD AUTO: 8.8 % (ref 19.6–45.3)
MCH RBC QN AUTO: 23.6 PG (ref 26.6–33)
MCHC RBC AUTO-ENTMCNC: 31.1 G/DL (ref 31.5–35.7)
MCV RBC AUTO: 75.9 FL (ref 79–97)
MICROCYTES BLD QL: NORMAL
MONOCYTES # BLD AUTO: 0.56 10*3/MM3 (ref 0.1–0.9)
MONOCYTES NFR BLD AUTO: 9.3 % (ref 5–12)
NEUTROPHILS NFR BLD AUTO: 4.65 10*3/MM3 (ref 1.7–7)
NEUTROPHILS NFR BLD AUTO: 76.9 % (ref 42.7–76)
NRBC BLD AUTO-RTO: 1.2 /100 WBC (ref 0–0.2)
PLATELET # BLD AUTO: 69 10*3/MM3 (ref 140–450)
PMV BLD AUTO: ABNORMAL FL
POIKILOCYTOSIS BLD QL SMEAR: NORMAL
POTASSIUM SERPL-SCNC: 4.1 MMOL/L (ref 3.5–5.2)
PROT SERPL-MCNC: 7.1 G/DL (ref 6–8.5)
RBC # BLD AUTO: 4.24 10*6/MM3 (ref 3.77–5.28)
SMALL PLATELETS BLD QL SMEAR: NORMAL
SODIUM SERPL-SCNC: 137 MMOL/L (ref 136–145)
WBC MORPH BLD: NORMAL
WBC NRBC COR # BLD: 6.04 10*3/MM3 (ref 3.4–10.8)

## 2022-04-04 PROCEDURE — 85007 BL SMEAR W/DIFF WBC COUNT: CPT | Performed by: INTERNAL MEDICINE

## 2022-04-04 PROCEDURE — 99233 SBSQ HOSP IP/OBS HIGH 50: CPT | Performed by: INTERNAL MEDICINE

## 2022-04-04 PROCEDURE — 63710000001 INSULIN ASPART PER 5 UNITS: Performed by: INTERNAL MEDICINE

## 2022-04-04 PROCEDURE — 63710000001 INSULIN DETEMIR PER 5 UNITS: Performed by: INTERNAL MEDICINE

## 2022-04-04 PROCEDURE — 80074 ACUTE HEPATITIS PANEL: CPT | Performed by: INTERNAL MEDICINE

## 2022-04-04 PROCEDURE — 85025 COMPLETE CBC W/AUTO DIFF WBC: CPT | Performed by: INTERNAL MEDICINE

## 2022-04-04 PROCEDURE — 25010000002 PIPERACILLIN SOD-TAZOBACTAM PER 1 G

## 2022-04-04 PROCEDURE — 25010000002 ALBUMIN HUMAN 25% PER 50 ML: Performed by: INTERNAL MEDICINE

## 2022-04-04 PROCEDURE — 80053 COMPREHEN METABOLIC PANEL: CPT | Performed by: INTERNAL MEDICINE

## 2022-04-04 PROCEDURE — P9047 ALBUMIN (HUMAN), 25%, 50ML: HCPCS | Performed by: INTERNAL MEDICINE

## 2022-04-04 PROCEDURE — 82962 GLUCOSE BLOOD TEST: CPT

## 2022-04-04 RX ORDER — KETOCONAZOLE 20 MG/G
1 CREAM TOPICAL 2 TIMES DAILY
Status: DISCONTINUED | OUTPATIENT
Start: 2022-04-04 | End: 2022-04-14 | Stop reason: HOSPADM

## 2022-04-04 RX ORDER — ALBUMIN (HUMAN) 12.5 G/50ML
25 SOLUTION INTRAVENOUS ONCE
Status: COMPLETED | OUTPATIENT
Start: 2022-04-04 | End: 2022-04-04

## 2022-04-04 RX ORDER — DEXTROSE MONOHYDRATE 25 G/50ML
25 INJECTION, SOLUTION INTRAVENOUS
Status: DISCONTINUED | OUTPATIENT
Start: 2022-04-04 | End: 2022-04-14 | Stop reason: HOSPADM

## 2022-04-04 RX ORDER — AMOXICILLIN AND CLAVULANATE POTASSIUM 500; 125 MG/1; MG/1
1 TABLET, FILM COATED ORAL EVERY 12 HOURS SCHEDULED
Status: DISCONTINUED | OUTPATIENT
Start: 2022-04-05 | End: 2022-04-10

## 2022-04-04 RX ORDER — SPIRONOLACTONE 25 MG/1
50 TABLET ORAL DAILY
Status: DISCONTINUED | OUTPATIENT
Start: 2022-04-04 | End: 2022-04-05

## 2022-04-04 RX ORDER — NICOTINE POLACRILEX 4 MG
1 LOZENGE BUCCAL
Status: DISCONTINUED | OUTPATIENT
Start: 2022-04-04 | End: 2022-04-04 | Stop reason: SDUPTHER

## 2022-04-04 RX ORDER — MUPIROCIN CALCIUM 20 MG/G
1 CREAM TOPICAL 3 TIMES DAILY
Status: DISPENSED | OUTPATIENT
Start: 2022-04-04 | End: 2022-04-11

## 2022-04-04 RX ORDER — BUMETANIDE 0.25 MG/ML
4 INJECTION INTRAMUSCULAR; INTRAVENOUS EVERY 6 HOURS
Status: COMPLETED | OUTPATIENT
Start: 2022-04-04 | End: 2022-04-04

## 2022-04-04 RX ADMIN — INSULIN DETEMIR 20 UNITS: 100 INJECTION, SOLUTION SUBCUTANEOUS at 10:15

## 2022-04-04 RX ADMIN — MUPIROCIN 1 APPLICATION: 2 CREAM TOPICAL at 20:49

## 2022-04-04 RX ADMIN — Medication 10 ML: at 10:13

## 2022-04-04 RX ADMIN — HYDRALAZINE HYDROCHLORIDE 25 MG: 25 TABLET ORAL at 14:56

## 2022-04-04 RX ADMIN — HYDRALAZINE HYDROCHLORIDE 25 MG: 25 TABLET ORAL at 06:08

## 2022-04-04 RX ADMIN — INSULIN ASPART 14 UNITS: 100 INJECTION, SOLUTION INTRAVENOUS; SUBCUTANEOUS at 11:00

## 2022-04-04 RX ADMIN — SPIRONOLACTONE 50 MG: 25 TABLET, FILM COATED ORAL at 12:00

## 2022-04-04 RX ADMIN — TAZOBACTAM SODIUM AND PIPERACILLIN SODIUM 4.5 G: 500; 4 INJECTION, SOLUTION INTRAVENOUS at 04:51

## 2022-04-04 RX ADMIN — Medication 1 TABLET: at 10:05

## 2022-04-04 RX ADMIN — METOPROLOL SUCCINATE 100 MG: 100 TABLET, EXTENDED RELEASE ORAL at 10:05

## 2022-04-04 RX ADMIN — INSULIN ASPART 5 UNITS: 100 INJECTION, SOLUTION INTRAVENOUS; SUBCUTANEOUS at 17:39

## 2022-04-04 RX ADMIN — LIDOCAINE 1 PATCH: 50 PATCH CUTANEOUS at 20:48

## 2022-04-04 RX ADMIN — TAZOBACTAM SODIUM AND PIPERACILLIN SODIUM 4.5 G: 500; 4 INJECTION, SOLUTION INTRAVENOUS at 15:50

## 2022-04-04 RX ADMIN — ISOSORBIDE MONONITRATE 30 MG: 30 TABLET, EXTENDED RELEASE ORAL at 10:05

## 2022-04-04 RX ADMIN — Medication 1 CAPSULE: at 20:48

## 2022-04-04 RX ADMIN — BUMETANIDE 4 MG: 0.25 INJECTION, SOLUTION INTRAMUSCULAR; INTRAVENOUS at 17:49

## 2022-04-04 RX ADMIN — Medication 3 ML: at 10:14

## 2022-04-04 RX ADMIN — Medication 10 ML: at 10:05

## 2022-04-04 RX ADMIN — LEVOTHYROXINE SODIUM 150 MCG: 150 TABLET ORAL at 10:05

## 2022-04-04 RX ADMIN — FERROUS SULFATE TAB EC 324 MG (65 MG FE EQUIVALENT) 324 MG: 324 (65 FE) TABLET DELAYED RESPONSE at 17:40

## 2022-04-04 RX ADMIN — KETOCONAZOLE 1 APPLICATION: 20 CREAM TOPICAL at 11:04

## 2022-04-04 RX ADMIN — ALBUMIN HUMAN 25 G: 0.25 SOLUTION INTRAVENOUS at 12:00

## 2022-04-04 RX ADMIN — MUPIROCIN 1 APPLICATION: 2 CREAM TOPICAL at 11:04

## 2022-04-04 RX ADMIN — MUPIROCIN 1 APPLICATION: 2 CREAM TOPICAL at 15:53

## 2022-04-04 RX ADMIN — FERROUS SULFATE TAB EC 324 MG (65 MG FE EQUIVALENT) 324 MG: 324 (65 FE) TABLET DELAYED RESPONSE at 10:05

## 2022-04-04 RX ADMIN — PANTOPRAZOLE SODIUM 40 MG: 40 TABLET, DELAYED RELEASE ORAL at 10:05

## 2022-04-04 RX ADMIN — Medication 10 ML: at 20:49

## 2022-04-04 RX ADMIN — GUAIFENESIN 600 MG: 600 TABLET, EXTENDED RELEASE ORAL at 10:05

## 2022-04-04 RX ADMIN — Medication 1 CAPSULE: at 10:04

## 2022-04-04 RX ADMIN — SILVER SULFADIAZINE 1 APPLICATION: 10 CREAM TOPICAL at 16:31

## 2022-04-04 RX ADMIN — GUAIFENESIN 600 MG: 600 TABLET, EXTENDED RELEASE ORAL at 20:48

## 2022-04-04 RX ADMIN — DOCUSATE SODIUM 100 MG: 100 CAPSULE, LIQUID FILLED ORAL at 10:05

## 2022-04-04 RX ADMIN — KETOCONAZOLE 1 APPLICATION: 20 CREAM TOPICAL at 20:49

## 2022-04-04 RX ADMIN — INSULIN DETEMIR 20 UNITS: 100 INJECTION, SOLUTION SUBCUTANEOUS at 21:00

## 2022-04-04 RX ADMIN — SILVER SULFADIAZINE 1 APPLICATION: 10 CREAM TOPICAL at 10:12

## 2022-04-04 RX ADMIN — BUMETANIDE 4 MG: 0.25 INJECTION, SOLUTION INTRAMUSCULAR; INTRAVENOUS at 12:00

## 2022-04-04 RX ADMIN — HYDRALAZINE HYDROCHLORIDE 25 MG: 25 TABLET ORAL at 21:00

## 2022-04-04 RX ADMIN — ASPIRIN 81 MG: 81 TABLET, CHEWABLE ORAL at 10:05

## 2022-04-04 NOTE — PROGRESS NOTES
[facility] Progress Note  [facility]. [place]        Patient Name: Millicent Mckeon  : 1960  MRN: [id number]   LOS: 3 days    Patient Care Team:  Marianela Albright APRN as PCP - General (Family Medicine)  Geremias Shepherd MD as Consulting Physician (General Surgery)  Lisa Cardoso MD as Surgeon (General Surgery)    Chief Complaint:    Chief Complaint   Patient presents with   • Weakness - Generalized     Unable to stand     Primary Care Physician:  Mraianela Albright APRN  Date of admission: 3/30/2022    Subjective     Interval History:   Events noted from last 24 hours.  Currently she does appears to be doing better, lot more alert and interactive.  She denies having any chest pain or shortness of breath.  No fever.  She keeps on repeating that she wants to go home.    Review of Systems:   As noted above    Objective     Vitals:   Temp:  [96.1 °F (35.6 °C)-98.3 °F (36.8 °C)] 96.8 °F (36 °C)  Heart Rate:  [72-98] 85  Resp:  [16-19] 16  BP: (124-139)/(68-90) 139/90  Flow (L/min):  [2] 2    Intake/Output Summary (Last 24 hours) at 2022 1106  Last data filed at 2022 0900  Gross per 24 hour   Intake 1487 ml   Output 1750 ml   Net -263 ml       Physical Exam:    General Appearance: alert,  no acute distress   Skin: warm and dry  HEENT: oral mucosa normal, nonicteric sclera  Neck: supple, no JVD  Lungs: CTA  Heart: RRR, normal S1 and S2  Abdomen: Obese, soft to firm, nontender, nondistended, she does have dependent edema mostly on the back of her body including sacral area.  : no palpable bladder  Extremities: 2-3+ edema, mostly it is dependent edema, no cyanosis or clubbing  Neuro: He is able to communicate and randomly moves extremities.    Scheduled Meds:     Current Facility-Administered Medications   Medication Dose Route Frequency Provider Last Rate Last Admin   • acetaminophen (TYLENOL) tablet 650 mg  650 mg Oral Q4H PRN Samson Reyes MD   650 mg at 22  0629    Or   • acetaminophen (TYLENOL) 160 MG/5ML solution 650 mg  650 mg Oral Q4H PRN Samson Reyes MD        Or   • acetaminophen (TYLENOL) suppository 650 mg  650 mg Rectal Q4H PRN Samson Reyes MD       • aspirin chewable tablet 81 mg  81 mg Oral Daily Samson Reyes MD   81 mg at 04/04/22 1005   • b complex-vitamin c-folic acid (NEPHRO-LOIS) tablet 1 tablet  1 tablet Oral Daily Samson Reyes MD   1 tablet at 04/04/22 1005   • dextrose (D50W) (25 g/50 mL) IV injection 25 g  25 g Intravenous Q15 Min PRN Samson Reyes MD       • dextrose (D50W) (25 g/50 mL) IV injection 25 g  25 g Intravenous Q15 Min PRN Samson Reyes MD       • dextrose (D50W) (25 g/50 mL) IV injection 50 mL  50 mL Intravenous Q1H PRN Samson Reyes MD   50 mL at 03/30/22 2159   • dextrose (D50W) (25 g/50 mL) IV injection 50 mL  50 mL Intravenous Q1H PRN Jerry Vargas PA-C   50 mL at 03/31/22 0630   • dextrose (GLUTOSE) oral gel 1 tube  1 tube Oral Q15 Min PRN Samson Reyes MD       • docusate sodium (COLACE) capsule 100 mg  100 mg Oral BID Samson Reyes MD   100 mg at 04/04/22 1005   • ferrous sulfate EC tablet 324 mg  324 mg Oral BID With Meals Samson Reyes MD   324 mg at 04/04/22 1005   • glucagon (human recombinant) (GLUCAGEN DIAGNOSTIC) injection 1 mg  1 mg Subcutaneous PRN Samson Reyes MD       • guaiFENesin (MUCINEX) 12 hr tablet 600 mg  600 mg Oral Q12H Samson Reyes MD   600 mg at 04/04/22 1005   • hydrALAZINE (APRESOLINE) tablet 25 mg  25 mg Oral Q8H Milad Leone MD   25 mg at 04/04/22 0608   • insulin aspart (novoLOG) injection 0-14 Units  0-14 Units Subcutaneous TID AC Samson Reyes MD   14 Units at 04/04/22 1100   • insulin detemir (LEVEMIR) injection 20 Units  20 Units Subcutaneous Q12H Samson Reyes MD   20 Units at 04/04/22 1015   • ipratropium-albuterol (DUO-NEB) nebulizer solution 3 mL  3 mL Nebulization Q4H PRN Samson Reyes MD       • isosorbide mononitrate (IMDUR) 24 hr tablet 30 mg  30 mg Oral Daily Amy  MD Samson   30 mg at 04/04/22 1005   • ketoconazole (NIZORAL) 2 % cream 1 application  1 application Topical BID Doc Keith MD   1 application at 04/04/22 1104   • lactobacillus acidophilus (RISAQUAD) capsule 1 capsule  1 capsule Oral BID Samson Reyes MD   1 capsule at 04/04/22 1004   • levothyroxine (SYNTHROID, LEVOTHROID) tablet 150 mcg  150 mcg Oral Daily Samson Reyes MD   150 mcg at 04/04/22 1005   • lidocaine (LIDODERM) 5 % 1 patch  1 patch Transdermal Q24H Samson Reyes MD   1 patch at 04/03/22 2057   • metoprolol succinate XL (TOPROL-XL) 24 hr tablet 100 mg  100 mg Oral Q24H Samson Reyes MD   100 mg at 04/04/22 1005   • mupirocin (BACTROBAN) 2 % cream 1 application  1 application Topical TID Doc Keith MD   1 application at 04/04/22 1104   • ondansetron (ZOFRAN) injection 4 mg  4 mg Intravenous Q6H PRN Samson Reyes MD       • pantoprazole (PROTONIX) EC tablet 40 mg  40 mg Oral Daily Samson Reyes MD   40 mg at 04/04/22 1005   • Pharmacy to Dose Zosyn   Does not apply Continuous PRN Samson Reyes MD       • piperacillin-tazobactam (ZOSYN) 4.5 g in iso-osmotic dextrose 100 mL IVPB (premix)  4.5 g Intravenous Q12H Charles Laird RPH   4.5 g at 04/04/22 0451   • silver sulfadiazine (SILVADENE, SSD) 1 % cream 1 application  1 application Topical Q12H Samson Reyes MD   1 application at 04/04/22 1012   • sodium chloride 0.9 % flush 10 mL  10 mL Intravenous PRN Samson Reyes MD       • sodium chloride 0.9 % flush 10 mL  10 mL Intravenous Q12H Samson Reyes MD   10 mL at 04/04/22 1013   • sodium chloride 0.9 % flush 10 mL  10 mL Intravenous Q12H Samson Reyes MD   10 mL at 04/04/22 1013   • sodium chloride 0.9 % flush 10 mL  10 mL Intravenous Q12H Samson Reyes MD   10 mL at 04/04/22 1005   • sodium chloride 0.9 % flush 10 mL  10 mL Intravenous PRN Samson Reyes MD       • sodium chloride 0.9 % flush 20 mL  20 mL Intravenous PRN Samson Reyes MD       • sodium chloride 0.9 % flush 3 mL  3 mL Intravenous Q12H  Samson Reyes MD   3 mL at 04/04/22 1014   • sodium chloride 0.9 % flush 3-10 mL  3-10 mL Intravenous PRN Samson Reyes MD           aspirin, 81 mg, Oral, Daily  b complex-vitamin c-folic acid, 1 tablet, Oral, Daily  docusate sodium, 100 mg, Oral, BID  ferrous sulfate, 324 mg, Oral, BID With Meals  guaiFENesin, 600 mg, Oral, Q12H  hydrALAZINE, 25 mg, Oral, Q8H  insulin aspart, 0-14 Units, Subcutaneous, TID AC  insulin detemir, 20 Units, Subcutaneous, Q12H  isosorbide mononitrate, 30 mg, Oral, Daily  ketoconazole, 1 application, Topical, BID  lactobacillus acidophilus, 1 capsule, Oral, BID  levothyroxine, 150 mcg, Oral, Daily  lidocaine, 1 patch, Transdermal, Q24H  metoprolol succinate XL, 100 mg, Oral, Q24H  mupirocin, 1 application, Topical, TID  pantoprazole, 40 mg, Oral, Daily  piperacillin-tazobactam, 4.5 g, Intravenous, Q12H  silver sulfadiazine, 1 application, Topical, Q12H  sodium chloride, 10 mL, Intravenous, Q12H  sodium chloride, 10 mL, Intravenous, Q12H  sodium chloride, 10 mL, Intravenous, Q12H  sodium chloride, 3 mL, Intravenous, Q12H        IV Meds:   Pharmacy to Dose Zosyn,         Results Reviewed:   I have personally reviewed the results from the time of this admission to 4/4/2022 11:06 EDT     Results from last 7 days   Lab Units 04/04/22  0955 04/03/22  0604 04/02/22  0453 03/31/22  0541 03/30/22  1534   SODIUM mmol/L 137 141 141   < > 142   POTASSIUM mmol/L 4.1 4.5 4.6   < > 4.5   CHLORIDE mmol/L 95* 97* 98   < > 100   CO2 mmol/L 23.2 21.2* 18.1*   < > 24.2   BUN mg/dL 97* 101* 94*   < > 75*   CREATININE mg/dL 5.03* 4.69* 4.74*   < > 3.74*   CALCIUM mg/dL 7.6* 7.9* 8.1*   < > 8.0*   BILIRUBIN mg/dL 0.6 0.7  --   --  0.7   ALK PHOS U/L 225* 250*  --   --  261*   ALT (SGPT) U/L 81* 73*  --   --  17   AST (SGOT) U/L 61* 77*  --   --  22   GLUCOSE mg/dL 428* 251* 132*   < > 40*    < > = values in this interval not displayed.       Estimated Creatinine Clearance: 17.3 mL/min (A) (by C-G formula based  on SCr of 5.03 mg/dL (H)).    Results from last 7 days   Lab Units 04/02/22  0453 04/01/22  0421 03/30/22  1534   MAGNESIUM mg/dL  --   --  2.5*   PHOSPHORUS mg/dL 6.6* 6.7*  --              Results from last 7 days   Lab Units 04/04/22  0955 04/03/22  0604 04/02/22  0453 03/31/22  0541 03/30/22  1534   WBC 10*3/mm3 6.04 6.00 6.23 5.67 4.54   HEMOGLOBIN g/dL 10.0* 9.7* 10.2* 10.3* 10.8*   PLATELETS 10*3/mm3 69* 77* 88* 107* 106*       Results from last 7 days   Lab Units 04/03/22  0604 04/02/22  0453 03/31/22  1343   INR  1.84* 3.30* 2.54*       Brief Urine Lab Results  (Last result in the past 365 days)      Color   Clarity   Blood   Leuk Est   Nitrite   Protein   CREAT   Urine HCG        03/30/22 1705 Yellow   Clear   Trace   Negative   Negative   >=300 mg/dL (3+)                 No results found for: UTPCR    Imaging Results (Last 24 Hours)     ** No results found for the last 24 hours. **              Assessment / Plan     ASSESSMENT:    Acute kidney injury: It is quite likely that she has worsening renal function secondary to her baseline worsening of renal function, it is quite possible that she has not been taking her medications in has worsening cardiorenal syndrome.  I will go ahead and optimize the medications and see if we can get some improvement in her renal function.  If no improvement her only option will be to start renal replacement therapy or go to in center hospice for comfort care only.    Chronic kidney disease, stage IV (severe) ([facility]): Baseline diabetic and hypertensive nephropathy with likely combination of cardiorenal syndrome.    Anemia due to stage 4 chronic kidney disease (HCC): Hemoglobin appears to be fairly stable.    Chronic diastolic congestive heart failure (HCC): We will try to diurese with high-dose diuretics and see if she will respond to it.    Cor pulmonale, chronic (HCC): Longstanding history of untreated sleep apnea might be contributing.    Hypoglycemia: Oral hypoglycemic  agents with prolonged effect from renal failure likely the cause.    Bilateral edema of lower extremity: She does not have much lower extremity edema most of the edema is in the abdominal and chest area appears to be having generalized anasarca.    Essential hypertension: Blood pressure is on the low side we will hold off all blood pressure medications we will go ahead and start her on midodrine to maintain blood pressure while aggressively diuresing her.    Acquired hypothyroidism: Continue home Synthroid dose.    Type 2 diabetes mellitus with nephropathy (HCC): As per hospitalist service.    Cellulitis of both lower extremities: IV antibiotics have been started.    Bradycardia: Currently bradycardic will hold beta-blockers for now.      PLAN:  Renal function is slightly worse, She still has fairly significant edema we will continue IV diuretics will change to Bumex 4 mg mg IV every 6 hours x 2 doses.  No acute indications for dialysis today, I think she is getting to the point where we will have to make a decision.  I have left a message with her POA Val to call me back.  I will make her n.p.o. after midnight and consult Dr. Shrestha for a tunneled dialysis catheter placement.  It will all still depends if patient agrees to it although Val was fairly clear that she will do dialysis if she has to.  Serum creatinine is slightly worse.  Continue to optimize medications.  Details were discussed with the patient no family.  Left message for Val to call back.  Details were also discussed with the hospitalist service as well as nursing staff.  Continue with rest of the current treatment plan, and monitor with surveillance labs.  Further recommendations will depend on clinical course of the patient during the current hospitalization.     Thank you for involving us in the care of Millicent Mckeon.  Please feel free to call with any questions.    Milad Leone MD  04/04/22  11:06 EDT     11:48 AM I had at length  discussion with the POA Val Cornell as well as Dr. Shrestha, the POA said that she will be able to help and there are multiple other family members who are willing to do her peritoneal dialysis at home.  We have made them aware that there is a training process and we will have to start her on a hemodialysis and once she is set up for hemodialysis, we will quickly make arrangements for her to be switched to peritoneal dialysis.  I will also ask Dr. Shrestha to see if he can place the peritoneal dialysis catheter as well as the tunneled dialysis catheter tomorrow.  He said he is not sure if he can do the peritoneal dialysis catheter but for sure get the tunneled dialysis catheter placed.  Her POA is going to come and talk to her and try to see if she will understand otherwise she will honor her wishes and probably make her comfortable with palliative and hospice at home.  We will have to wait till tomorrow to make the final decision as family discussions are ongoing.    [facility]  [phone]      Much of this encounter note is an electronic transcription/translation of spoken language to printed text. The electronic translation of spoken language may permit erroneous, or at times, nonsensical words or phrases to be inadvertently transcribed; Although I have reviewed the note for such errors, some may still exist.

## 2022-04-04 NOTE — PROGRESS NOTES
Cross Cover Note    I was notified of skin lesions on groin; reviewed pictures  -start mupirocin cream 2% TID x 7 days  -start ketoconazole cream 2% BID until resolution of lesions or up to 4 weeks.

## 2022-04-04 NOTE — PLAN OF CARE
Spoke to pt's niece, Val, via phone call.  She reported that she spoke to the patient and felt successful in their conversation this past weekend.  She is in agreement for the patient to go home and said the pt agreed to HH.  However, she verbalized understanding that the pt's kidneys were not get better and that the pt was refusing dialysis.  Her preference is for the pt to get dialysis as she was told she could do it at home.  She understands that the patient is refusing but feels she can convince her to do it.  Val reported speaking with the nephrologist today and was informed her it was time to make a decision.  She will plan to visit pt later this afternoon.    Home palliative program (if pt decides to do dialysis) vs home hospice care discussed in detail.  Val verbalized understanding.  If pt refuses to do dialysis before she leaves, she may will probably elect to have hospice.  However, she said that she would continue conversation re dialysis with pt at home in hopes she will change her mind.  Palliative services will continue to follow.

## 2022-04-04 NOTE — THERAPY TREATMENT NOTE
"PT attempted treatment; however, she declined stating, \"I'm too tired.\"  PT encouraged her to do exercises and she states \"They keep waking me up all the time.\"  PT will follow up tomorrow.        "

## 2022-04-04 NOTE — DISCHARGE PLACEMENT REQUEST
"Call Rosy Alcala [phone].    Tatiana Mckeon (61 y.o. Female)             Date of Birth   1960    Social Security Number       Address   [address]  [place] [id number]    Home Phone   [phone]    MRN   [id number]       Zoroastrianism   Unknown    Marital Status   Single                            Admission Date   3/30/22    Admission Type   Emergency    Admitting Provider   Samson Reyes MD    Attending Provider   Samson Reyes MD    Department, Room/Bed   [facility] MED SURG  4, 429/1       Discharge Date       Discharge Disposition       Discharge Destination                               Attending Provider: Samson Reyes MD    Allergies: Metformin And Related    Isolation: Contact   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), 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)   Code Status: No CPR   Advance Care Planning Activity    Ht: 167.4 cm (65.91\")   Wt: 150 kg (330 lb 4 oz)    Admission Cmt: None   Principal Problem: Hypoglycemia [E16.2]                 Active Insurance as of 3/30/2022     Primary Coverage     Payor Plan Insurance Group Employer/Plan Group    MEDICARE MEDICARE A & B      Payor Plan Address Payor Plan Phone Number Payor Plan Fax Number Effective Dates    [address] [phone]  12/1/1998 - None Entered    [facility] [address]       Subscriber Name Subscriber Birth Date Member ID       TATIANA MCKEON 1960 [id number]           Secondary Coverage     Payor Plan Insurance Group Employer/Plan Group    [facility] COMMUNITY PLAN [facility]UN[facility] PL[facility] [place]     Payor Plan Address Payor Plan Phone Number Payor Plan Fax Number Effective Dates    [address]   1/1/2022 - None Entered    [facility] [address]       Subscriber Name Subscriber Birth Date Member ID       TATIANA MCKEON 1960 [id number]                 Emergency Contacts      (Rel.) Home Phone Work Phone Mobile Phone    SUNDAY CHACON (Relative) [phone] -- [phone]    Adolfo cMkeon (Brother) [phone] " -- --               History & Physical      Samson Reyes MD at 22 1706            [facility]   HISTORY AND PHYSICAL      Name:  Millicent Mckeon   Age:  61 y.o.  Sex:  female  :  1960  MRN:  [id number]   Visit Number:  [id number]  Admission Date:  3/30/2022  Date Of Service:  22  Primary Care Physician:  Marianela Albright APRN    Chief Complaint:     Generalized weakness.    History Of Presenting Illness:      Ms. Mckeon is a 61-year-old chronically ill lady with history of atrial fibrillation, chronic systolic heart failure, chronic kidney disease stage IV, chronic cor pulmonale, morbid obesity, functional quadriplegia who is wheelchair-bound was brought to the emergency room by EMS with symptoms of generalized weakness.  Apparently she was found at home disheveled, covered in various stages of skin sores and wounds.  She was discharged by me from this facility on 3/7/2022 October being treated for severe hypothermia likely environmental associated with hypoglycemia and bilateral lower extremity cellulitis.  At that time her Bumex dose was increased and she was sent home on antibiotics therapy.  Patient apparently lives alone after her sister passed away in 2021.  She states that she basically scoots from the bed to the potty chair but otherwise she is unable to walk.  She states that her brother comes every day and brings her food.  She stated that she asked her niece to call the ambulance because she was so weak that she could not stand up today.    In the emergency room, she was afebrile with a pulse of 47 and blood pressure of 121/82 and saturating at 94% on room air.  Blood work done in the emergency room revealed a glucose of 40 with a creatinine of 3.74 (apparently about baseline), BUN of 75, alkaline phosphate of 261 and a hemoglobin of 10.8.  Her proBNP level was 17,302 but troponin was negative.  Chest x-ray showed cardiomegaly with bilateral homogeneous  opacity in the lower zones.  Patient was given IV dextrose 1 ampoule in the emergency room.  She is currently being admitted to the medical floor with telemetry for further evaluation and management.    Review Of Systems:    All systems were reviewed and negative except as mentioned in history of presenting illness, assessment and plan.    Past Medical History: Patient  has a past medical history of A-fib (HCC), Anemia (10/2/2018), Diabetes mellitus (HCC), Disease of thyroid gland, GERD (gastroesophageal reflux disease), Gout, History of transfusion, and Hypertension.    Past Surgical History: Patient  has a past surgical history that includes Eye surgery; Leg Surgery; and incision and drainage leg (Right, 1/14/2019).    Social History: Patient  reports that she has never smoked. She has never used smokeless tobacco. She reports that she does not drink alcohol and does not use drugs.    Family History: Patient's family history includes Asthma in her mother; Hypertension in her father; Stroke in her father.    Allergies:      Metformin and related    Home Medications:    Prior to Admission Medications     Prescriptions Last Dose Informant Patient Reported? Taking?    acetaminophen (TYLENOL) 325 MG tablet   Yes No    Take 650 mg by mouth Every 4 (Four) Hours As Needed for Mild Pain .    amiodarone (PACERONE) 100 MG tablet   No No    Take 1 tablet by mouth Every Other Day.    ammonium lactate (AMLACTIN) 12 % cream   Yes No    Apply 1 application topically to the appropriate area as directed 2 (Two) Times a Day.    apixaban (ELIQUIS) 5 MG tablet tablet   No No    Take 1 tablet by mouth Every 12 (Twelve) Hours.    arginine 500 MG tablet   Yes No    Take 500 mg by mouth Daily.    aspirin 81 MG chewable tablet   Yes No    Chew 81 mg Daily.    bumetanide (Bumex) 1 MG tablet   No No    Take 1 tablet by mouth 2 (Two) Times a Day.    docusate sodium (COLACE) 100 MG capsule   Yes No    Take 100 mg by mouth 2 (Two) Times a Day.     Elastic Bandages & Supports (JOBST OPAQUE KNEE 20-30MMHG XL) misc   No No    1 application Daily.    ferrous sulfate 324 (65 Fe) MG tablet delayed-release EC tablet   Yes No    Take 324 mg by mouth 2 (Two) Times a Day With Meals.    insulin aspart (novoLOG) 100 UNIT/ML injection   Yes No    Inject  under the skin into the appropriate area as directed 2 (Two) Times a Day. Sliding scale, low dose     isosorbide mononitrate (ISMO,MONOKET) 10 MG tablet   Yes No    Take 15 mg by mouth Daily.    levothyroxine (SYNTHROID, LEVOTHROID) 150 MCG tablet   Yes No    Take 150 mcg by mouth Daily.    lidocaine (LIDODERM) 5 %   Yes No    Place 1 patch on the skin as directed by provider Daily. Apply patch to left knee daily, on at 0700 am off at 2000    metoprolol tartrate (LOPRESSOR) 100 MG tablet   No No    Take 1 tablet by mouth 2 (Two) Times a Day.    multivitamin (Thera) tablet tablet   Yes No    TAKE 1 TABLET BY MOUTH EVERY DAY    nitroglycerin (NITROSTAT) 0.4 MG SL tablet   Yes No    Place 0.4 mg under the tongue Every 5 (Five) Minutes As Needed for Chest Pain. Take no more than 3 doses in 15 minutes.    Nutritional Supplements (PROTEIN SUPPLEMENT 80% PO)   Yes No    Take 30 mL by mouth 2 (Two) Times a Day.    ondansetron ODT (ZOFRAN-ODT) 4 MG disintegrating tablet   Yes No    DISSOLVE 1 TABLET ON THE TONGUE EVERY 8 HOURS AS NEEDED FOR NAUSEA OR VOMITING    pantoprazole (PROTONIX) 40 MG EC tablet   Yes No    Take 40 mg by mouth Daily.    polyethylene glycol (MIRALAX) packet   Yes No    Take 17 g by mouth 2 (Two) Times a Day.    RA VITAMIN C 500 MG tablet   Yes No    Take 500 mg by mouth Daily.    saccharomyces boulardii (FLORASTOR) 250 MG capsule   Yes No    Take 250 mg by mouth 2 (Two) Times a Day.    simvastatin (ZOCOR) 20 MG tablet   Yes No    Take 20 mg by mouth Every Night.    spironolactone (ALDACTONE) 25 MG tablet   No No    Take 1 tablet by mouth 3 (Three) Times a Week. Monday, Wednesday and Friday.    Zinc Sulfate  "220 (50 Zn) MG tablet   Yes No    Take 1 tablet by mouth 2 (Two) Times a Day.        ED Medications:    Medications   sodium chloride 0.9 % flush 10 mL (has no administration in time range)   dextrose (D50W) (25 g/50 mL) IV injection 50 mL (50 mL Intravenous Given 3/30/22 1626)     Vital Signs:  Temp:  [98.7 °F (37.1 °C)] 98.7 °F (37.1 °C)  Heart Rate:  [47] 47  Resp:  [16] 16  BP: (121)/(82) 121/82        03/30/22  1521   Weight: (!) 160 kg (353 lb)     Body mass index is 57.14 kg/m².    Physical Exam:     Most recent vital Signs: /82 (BP Location: Left arm, Patient Position: Lying)   Pulse (!) 47   Temp 98.7 °F (37.1 °C) (Oral)   Resp 16   Ht 167.4 cm (65.91\")   Wt (!) 160 kg (353 lb)   SpO2 94%   BMI 57.14 kg/m²     Physical Exam  Constitutional:       General: She is not in acute distress.     Appearance: She is obese. She is ill-appearing.   HENT:      Head: Normocephalic and atraumatic.      Right Ear: External ear normal.      Left Ear: External ear normal.      Nose: Nose normal.      Mouth/Throat:      Mouth: Mucous membranes are moist.   Eyes:      Extraocular Movements: Extraocular movements intact.      Conjunctiva/sclera: Conjunctivae normal.   Cardiovascular:      Rate and Rhythm: Bradycardia present.      Heart sounds: Normal heart sounds. No murmur heard.    No gallop.   Pulmonary:      Effort: Pulmonary effort is normal.      Breath sounds: Rales present. No wheezing.   Abdominal:      General: Bowel sounds are normal. There is distension.      Palpations: Abdomen is soft.      Tenderness: There is no abdominal tenderness. There is no guarding.      Comments: Abdomen is obese with a large pannus   Musculoskeletal:      Cervical back: Neck supple. No rigidity.      Comments: 3+ pitting edema noted in bilateral lower extremities up to the mid thighs associated with erythema up to the knees with chronic bilateral peripheral cyanosis.  Multiple skin wounds with various stages of healing and " some nonhealing ulcers noted in the lower extremities.   Skin:     General: Skin is dry.      Coloration: Skin is not jaundiced.      Findings: Erythema and rash present.   Neurological:      Mental Status: She is alert and oriented to person, place, and time. Mental status is at baseline.   Psychiatric:         Mood and Affect: Mood normal.         Behavior: Behavior normal.       Laboratory data:    I have reviewed the labs done in the emergency room.    Results from last 7 days   Lab Units 03/30/22  1534   SODIUM mmol/L 142   POTASSIUM mmol/L 4.5   CHLORIDE mmol/L 100   CO2 mmol/L 24.2   BUN mg/dL 75*   CREATININE mg/dL 3.74*   CALCIUM mg/dL 8.0*   BILIRUBIN mg/dL 0.7   ALK PHOS U/L 261*   ALT (SGPT) U/L 17   AST (SGOT) U/L 22   GLUCOSE mg/dL 40*     Results from last 7 days   Lab Units 03/30/22  1534   WBC 10*3/mm3 4.54   HEMOGLOBIN g/dL 10.8*   HEMATOCRIT % 35.1   PLATELETS 10*3/mm3 106*         Results from last 7 days   Lab Units 03/30/22  1534   TROPONIN T ng/mL 0.025     Results from last 7 days   Lab Units 03/30/22  1534   PROBNP pg/mL 17,302.0*     EKG:      EKG done in the emergency room was reviewed by me.  It shows atrial fibrillation with slow ventricular rate of 50 bpm.  Normal axis.  Incomplete left bundle branch block noted.    Radiology:    No radiology results for the last 3 days    Assessment:    Generalized weakness secondary to #2 and #3.  Acute hypoglycemia, POA.  Severe bradycardia possibly related to medications in the setting of worsening renal failure.  Progressive worsening of chronic kidney disease stage IV.  Chronic venous stasis of the lower extremities with multiple skin ulcers, POA..  Chronic systolic heart failure with ejection fraction of 35%.  Paroxysmal atrial fibrillation on apixaban.  Chronic cor pulmonale.  Chronic hypoxic respiratory failure on home oxygen.  Diabetes mellitus type 2 with nephropathy.  Morbid obesity with a BMI of 57.  Anemia of chronic kidney  disease.  Functional quadriplegia.  Acquired hypothyroidism.    Plan:    Generalized weakness/hypoglycemia/bradycardia.  -Unfortunately, patient has multiple comorbidities and has progressive decline in her health in the last several months.  Her current symptomatology is likely due to a combination of hypoglycemia, bradycardia and congestive heart failure.  She may also have underlying pneumonia and we are waiting for official chest x-ray read.  -We will hold amiodarone and metoprolol at this time until her heart rate improves.  -We will consult physical and occupational therapy as well as case management services for safe discharge planning.    Progressive worsening of chronic kidney disease stage IV.  -Patient does have significant volume overload and we will consult Dr. Cutler from nephrology for further recommendations.  -We will avoid nephrotoxic agents.    Suspected bilateral pneumonia.  -An official report is currently pending.  Patient states that she is having cough and yellow sputum.  -We will go ahead and get blood cultures and start her on IV antibiotics therapy with Rocephin and azithromycin.  -She will be placed on lactobacillus supplements.    Overall patient's prognosis is extremely poor due to her progressive decline in health and multiple comorbidities especially renal failure, heart failure and morbid obesity.    Patient during her last admission wanted to be DNR and did not want dialysis.  She confirmed that she is DNR at this time.    I have discussed the patient's condition and treatment plan with her niece Val at length over the phone.  She definitely thinks that the patient cannot take care of herself at this stage and will at least need to go to short-term rehabilitation.      Risk Assessment: High due to worsening bradycardia and renal failure.  DVT Prophylaxis: Apixaban  Code Status: DNR/DNI  Diet: Diabetic renal.    Advance Care Planning      ACP discussion was held with the patient  during this visit. Patient does not have an advance directive, information provided.           Samson Reyes MD  03/30/22  17:06 EDT    Dictated utilizing Dragon dictation.    Electronically signed by Samson Reyes MD at 03/30/22 1745         Current Facility-Administered Medications   Medication Dose Route Frequency Provider Last Rate Last Admin   • acetaminophen (TYLENOL) tablet 650 mg  650 mg Oral Q4H PRN Samson Reyes MD   650 mg at 03/31/22 0629    Or   • acetaminophen (TYLENOL) 160 MG/5ML solution 650 mg  650 mg Oral Q4H PRN Samson Reyes MD        Or   • acetaminophen (TYLENOL) suppository 650 mg  650 mg Rectal Q4H PRN Samson Reyes MD       • albumin human 25 % IV SOLN 25 g  25 g Intravenous Once Milad Leone MD       • aspirin chewable tablet 81 mg  81 mg Oral Daily Samson Reyes MD   81 mg at 04/04/22 1005   • b complex-vitamin c-folic acid (NEPHRO-LOIS) tablet 1 tablet  1 tablet Oral Daily Samson Reyes MD   1 tablet at 04/04/22 1005   • bumetanide (BUMEX) injection 4 mg  4 mg Intravenous Q6H Milad Leone MD       • dextrose (D50W) (25 g/50 mL) IV injection 25 g  25 g Intravenous Q15 Min PRN Samson Reyes MD       • dextrose (D50W) (25 g/50 mL) IV injection 25 g  25 g Intravenous Q15 Min PRN Samson Reyes MD       • dextrose (D50W) (25 g/50 mL) IV injection 50 mL  50 mL Intravenous Q1H PRN Samson Reyes MD   50 mL at 03/30/22 2159   • dextrose (D50W) (25 g/50 mL) IV injection 50 mL  50 mL Intravenous Q1H PRN Jerry Vargas PA-C   50 mL at 03/31/22 0630   • dextrose (GLUTOSE) oral gel 1 tube  1 tube Oral Q15 Min PRN Samson Reyes MD       • docusate sodium (COLACE) capsule 100 mg  100 mg Oral BID Samson Reyes MD   100 mg at 04/04/22 1005   • ferrous sulfate EC tablet 324 mg  324 mg Oral BID With Meals Samson Reyes MD   324 mg at 04/04/22 1005   • glucagon (human recombinant) (GLUCAGEN DIAGNOSTIC) injection 1 mg  1 mg Subcutaneous PRN Samson Reyes MD       • guaiFENesin (MUCINEX) 12 hr  tablet 600 mg  600 mg Oral Q12H Samson Reyes MD   600 mg at 04/04/22 1005   • hydrALAZINE (APRESOLINE) tablet 25 mg  25 mg Oral Q8H Milad Leone MD   25 mg at 04/04/22 0608   • insulin aspart (novoLOG) injection 0-14 Units  0-14 Units Subcutaneous TID AC Samson Reyes MD   14 Units at 04/04/22 1100   • insulin detemir (LEVEMIR) injection 20 Units  20 Units Subcutaneous Q12H Samson Reyes MD   20 Units at 04/04/22 1015   • ipratropium-albuterol (DUO-NEB) nebulizer solution 3 mL  3 mL Nebulization Q4H PRN Samson Reyes MD       • isosorbide mononitrate (IMDUR) 24 hr tablet 30 mg  30 mg Oral Daily Samson Reyes MD   30 mg at 04/04/22 1005   • ketoconazole (NIZORAL) 2 % cream 1 application  1 application Topical BID Doc Keith MD   1 application at 04/04/22 1104   • lactobacillus acidophilus (RISAQUAD) capsule 1 capsule  1 capsule Oral BID Samson Reyes MD   1 capsule at 04/04/22 1004   • levothyroxine (SYNTHROID, LEVOTHROID) tablet 150 mcg  150 mcg Oral Daily Samson Reyes MD   150 mcg at 04/04/22 1005   • lidocaine (LIDODERM) 5 % 1 patch  1 patch Transdermal Q24H Samson Reyes MD   1 patch at 04/03/22 2057   • metoprolol succinate XL (TOPROL-XL) 24 hr tablet 100 mg  100 mg Oral Q24H Samson Reyes MD   100 mg at 04/04/22 1005   • mupirocin (BACTROBAN) 2 % cream 1 application  1 application Topical TID Doc Keith MD   1 application at 04/04/22 1104   • ondansetron (ZOFRAN) injection 4 mg  4 mg Intravenous Q6H PRN Samson Reyes MD       • pantoprazole (PROTONIX) EC tablet 40 mg  40 mg Oral Daily Samson Reyes MD   40 mg at 04/04/22 1005   • Pharmacy to Dose Zosyn   Does not apply Continuous PRN Samson Reyes MD       • piperacillin-tazobactam (ZOSYN) 4.5 g in iso-osmotic dextrose 100 mL IVPB (premix)  4.5 g Intravenous Q12H [facility]   4.5 g at 04/04/22 0451   • silver sulfadiazine (SILVADENE, SSD) 1 % cream 1 application  1 application Topical Q12H Samson Reyes MD   1 application at 04/04/22 1012   • sodium  chloride 0.9 % flush 10 mL  10 mL Intravenous PRN Samson Reyes MD       • sodium chloride 0.9 % flush 10 mL  10 mL Intravenous Q12H Samson Reyes MD   10 mL at 22 1013   • sodium chloride 0.9 % flush 10 mL  10 mL Intravenous Q12H Samson Reyes MD   10 mL at 22 1013   • sodium chloride 0.9 % flush 10 mL  10 mL Intravenous Q12H Samson Reyes MD   10 mL at 22 1005   • sodium chloride 0.9 % flush 10 mL  10 mL Intravenous PRN Samson Reyes MD       • sodium chloride 0.9 % flush 20 mL  20 mL Intravenous PRN Samson Reyes MD       • sodium chloride 0.9 % flush 3 mL  3 mL Intravenous Q12H Samson Reyes MD   3 mL at 22 1014   • sodium chloride 0.9 % flush 3-10 mL  3-10 mL Intravenous PRN Samson Reyes MD       • spironolactone (ALDACTONE) tablet 50 mg  50 mg Oral Daily Milad Leone MD            Physician Progress Notes (last 72 hours)      Milad Leone MD at 22 0942                [facility] Progress Note  [facility]        Patient Name: Millicent Mckeon  : 1960  MRN: [id number]   LOS: 3 days    Patient Care Team:  Marianela Albright APRN as PCP - General (Family Medicine)  Geremias Shepherd MD as Consulting Physician (General Surgery)  Lisa Cardoso MD as Surgeon (General Surgery)    Chief Complaint:    Chief Complaint   Patient presents with   • Weakness - Generalized     Unable to stand     Primary Care Physician:  Marianela Albright APRN  Date of admission: 3/30/2022    Subjective     Interval History:   Events noted from last 24 hours.  Currently she does appears to be doing better, lot more alert and interactive.  She denies having any chest pain or shortness of breath.  No fever.  She keeps on repeating that she wants to go home.    Review of Systems:   As noted above    Objective     Vitals:   Temp:  [96.1 °F (35.6 °C)-98.3 °F (36.8 °C)] 96.8 °F (36 °C)  Heart Rate:  [72-98] 85  Resp:  [16-19] 16  BP: (124-139)/(68-90) 139/90  Flow  (L/min):  [2] 2    Intake/Output Summary (Last 24 hours) at 4/4/2022 1106  Last data filed at 4/4/2022 0900  Gross per 24 hour   Intake 1487 ml   Output 1750 ml   Net -263 ml       Physical Exam:    General Appearance: alert,  no acute distress   Skin: warm and dry  HEENT: oral mucosa normal, nonicteric sclera  Neck: supple, no JVD  Lungs: CTA  Heart: RRR, normal S1 and S2  Abdomen: Obese, soft to firm, nontender, nondistended, she does have dependent edema mostly on the back of her body including sacral area.  : no palpable bladder  Extremities: 2-3+ edema, mostly it is dependent edema, no cyanosis or clubbing  Neuro: He is able to communicate and randomly moves extremities.    Scheduled Meds:     Current Facility-Administered Medications   Medication Dose Route Frequency Provider Last Rate Last Admin   • acetaminophen (TYLENOL) tablet 650 mg  650 mg Oral Q4H PRN Samson Reyes MD   650 mg at 03/31/22 0629    Or   • acetaminophen (TYLENOL) 160 MG/5ML solution 650 mg  650 mg Oral Q4H PRN Samson Reyes MD        Or   • acetaminophen (TYLENOL) suppository 650 mg  650 mg Rectal Q4H PRN Samson Reyes MD       • aspirin chewable tablet 81 mg  81 mg Oral Daily Samson Reyes MD   81 mg at 04/04/22 1005   • b complex-vitamin c-folic acid (NEPHRO-LOIS) tablet 1 tablet  1 tablet Oral Daily Samson Reyes MD   1 tablet at 04/04/22 1005   • dextrose (D50W) (25 g/50 mL) IV injection 25 g  25 g Intravenous Q15 Min PRN Samson Reyes MD       • dextrose (D50W) (25 g/50 mL) IV injection 25 g  25 g Intravenous Q15 Min PRN Samson eRyes MD       • dextrose (D50W) (25 g/50 mL) IV injection 50 mL  50 mL Intravenous Q1H PRN Samson Reyes MD   50 mL at 03/30/22 2159   • dextrose (D50W) (25 g/50 mL) IV injection 50 mL  50 mL Intravenous Q1H PRN Jerry Vargas PA-C   50 mL at 03/31/22 0630   • dextrose (GLUTOSE) oral gel 1 tube  1 tube Oral Q15 Min PRN Samson Reyes MD       • docusate sodium (COLACE) capsule 100 mg  100 mg Oral  BID Samson Reyes MD   100 mg at 04/04/22 1005   • ferrous sulfate EC tablet 324 mg  324 mg Oral BID With Meals Samson Reyes MD   324 mg at 04/04/22 1005   • glucagon (human recombinant) (GLUCAGEN DIAGNOSTIC) injection 1 mg  1 mg Subcutaneous PRN Samson Reyes MD       • guaiFENesin (MUCINEX) 12 hr tablet 600 mg  600 mg Oral Q12H Samson Reyes MD   600 mg at 04/04/22 1005   • hydrALAZINE (APRESOLINE) tablet 25 mg  25 mg Oral Q8H Milad Leone MD   25 mg at 04/04/22 0608   • insulin aspart (novoLOG) injection 0-14 Units  0-14 Units Subcutaneous TID AC Samson Reyes MD   14 Units at 04/04/22 1100   • insulin detemir (LEVEMIR) injection 20 Units  20 Units Subcutaneous Q12H Samson Reyes MD   20 Units at 04/04/22 1015   • ipratropium-albuterol (DUO-NEB) nebulizer solution 3 mL  3 mL Nebulization Q4H PRN Samson Reyes MD       • isosorbide mononitrate (IMDUR) 24 hr tablet 30 mg  30 mg Oral Daily Samson Reyes MD   30 mg at 04/04/22 1005   • ketoconazole (NIZORAL) 2 % cream 1 application  1 application Topical BID Doc Keith MD   1 application at 04/04/22 1104   • lactobacillus acidophilus (RISAQUAD) capsule 1 capsule  1 capsule Oral BID Samson Reyes MD   1 capsule at 04/04/22 1004   • levothyroxine (SYNTHROID, LEVOTHROID) tablet 150 mcg  150 mcg Oral Daily Samson Reyes MD   150 mcg at 04/04/22 1005   • lidocaine (LIDODERM) 5 % 1 patch  1 patch Transdermal Q24H Samson Reyes MD   1 patch at 04/03/22 2057   • metoprolol succinate XL (TOPROL-XL) 24 hr tablet 100 mg  100 mg Oral Q24H Samson Reyes MD   100 mg at 04/04/22 1005   • mupirocin (BACTROBAN) 2 % cream 1 application  1 application Topical TID Doc Keith MD   1 application at 04/04/22 1104   • ondansetron (ZOFRAN) injection 4 mg  4 mg Intravenous Q6H PRN Samson Reyes MD       • pantoprazole (PROTONIX) EC tablet 40 mg  40 mg Oral Daily Samson Reyes MD   40 mg at 04/04/22 1005   • Pharmacy to Dose Zosyn   Does not apply Continuous PRN Samson Reyes MD       •  piperacillin-tazobactam (ZOSYN) 4.5 g in iso-osmotic dextrose 100 mL IVPB (premix)  4.5 g Intravenous Q12H [facility]   4.5 g at 04/04/22 0451   • silver sulfadiazine (SILVADENE, SSD) 1 % cream 1 application  1 application Topical Q12H Samson Reyes MD   1 application at 04/04/22 1012   • sodium chloride 0.9 % flush 10 mL  10 mL Intravenous PRN Samson Reyes MD       • sodium chloride 0.9 % flush 10 mL  10 mL Intravenous Q12H Samson Reyes MD   10 mL at 04/04/22 1013   • sodium chloride 0.9 % flush 10 mL  10 mL Intravenous Q12H Samson Reyes MD   10 mL at 04/04/22 1013   • sodium chloride 0.9 % flush 10 mL  10 mL Intravenous Q12H Samson Reyes MD   10 mL at 04/04/22 1005   • sodium chloride 0.9 % flush 10 mL  10 mL Intravenous PRN Samson Reyes MD       • sodium chloride 0.9 % flush 20 mL  20 mL Intravenous PRN Samson Reyes MD       • sodium chloride 0.9 % flush 3 mL  3 mL Intravenous Q12H Samson Reyes MD   3 mL at 04/04/22 1014   • sodium chloride 0.9 % flush 3-10 mL  3-10 mL Intravenous PRN Samson Reyes MD           aspirin, 81 mg, Oral, Daily  b complex-vitamin c-folic acid, 1 tablet, Oral, Daily  docusate sodium, 100 mg, Oral, BID  ferrous sulfate, 324 mg, Oral, BID With Meals  guaiFENesin, 600 mg, Oral, Q12H  hydrALAZINE, 25 mg, Oral, Q8H  insulin aspart, 0-14 Units, Subcutaneous, TID AC  insulin detemir, 20 Units, Subcutaneous, Q12H  isosorbide mononitrate, 30 mg, Oral, Daily  ketoconazole, 1 application, Topical, BID  lactobacillus acidophilus, 1 capsule, Oral, BID  levothyroxine, 150 mcg, Oral, Daily  lidocaine, 1 patch, Transdermal, Q24H  metoprolol succinate XL, 100 mg, Oral, Q24H  mupirocin, 1 application, Topical, TID  pantoprazole, 40 mg, Oral, Daily  piperacillin-tazobactam, 4.5 g, Intravenous, Q12H  silver sulfadiazine, 1 application, Topical, Q12H  sodium chloride, 10 mL, Intravenous, Q12H  sodium chloride, 10 mL, Intravenous, Q12H  sodium chloride, 10 mL, Intravenous, Q12H  sodium  chloride, 3 mL, Intravenous, Q12H        IV Meds:   Pharmacy to Dose Zosyn,         Results Reviewed:   I have personally reviewed the results from the time of this admission to 4/4/2022 11:06 EDT     Results from last 7 days   Lab Units 04/04/22  0955 04/03/22  0604 04/02/22  0453 03/31/22  0541 03/30/22  1534   SODIUM mmol/L 137 141 141   < > 142   POTASSIUM mmol/L 4.1 4.5 4.6   < > 4.5   CHLORIDE mmol/L 95* 97* 98   < > 100   CO2 mmol/L 23.2 21.2* 18.1*   < > 24.2   BUN mg/dL 97* 101* 94*   < > 75*   CREATININE mg/dL 5.03* 4.69* 4.74*   < > 3.74*   CALCIUM mg/dL 7.6* 7.9* 8.1*   < > 8.0*   BILIRUBIN mg/dL 0.6 0.7  --   --  0.7   ALK PHOS U/L 225* 250*  --   --  261*   ALT (SGPT) U/L 81* 73*  --   --  17   AST (SGOT) U/L 61* 77*  --   --  22   GLUCOSE mg/dL 428* 251* 132*   < > 40*    < > = values in this interval not displayed.       Estimated Creatinine Clearance: 17.3 mL/min (A) (by C-G formula based on SCr of 5.03 mg/dL (H)).    Results from last 7 days   Lab Units 04/02/22  0453 04/01/22  0421 03/30/22  1534   MAGNESIUM mg/dL  --   --  2.5*   PHOSPHORUS mg/dL 6.6* 6.7*  --              Results from last 7 days   Lab Units 04/04/22  0955 04/03/22  0604 04/02/22 0453 03/31/22  0541 03/30/22  1534   WBC 10*3/mm3 6.04 6.00 6.23 5.67 4.54   HEMOGLOBIN g/dL 10.0* 9.7* 10.2* 10.3* 10.8*   PLATELETS 10*3/mm3 69* 77* 88* 107* 106*       Results from last 7 days   Lab Units 04/03/22  0604 04/02/22  0453 03/31/22  1343   INR  1.84* 3.30* 2.54*       Brief Urine Lab Results  (Last result in the past 365 days)      Color   Clarity   Blood   Leuk Est   Nitrite   Protein   CREAT   Urine HCG        03/30/22 1705 Yellow   Clear   Trace   Negative   Negative   >=300 mg/dL (3+)                 No results found for: UTPCR    Imaging Results (Last 24 Hours)     ** No results found for the last 24 hours. **              Assessment / Plan     ASSESSMENT:    Acute kidney injury: It is quite likely that she has worsening renal  function secondary to her baseline worsening of renal function, it is quite possible that she has not been taking her medications in has worsening cardiorenal syndrome.  I will go ahead and optimize the medications and see if we can get some improvement in her renal function.  If no improvement her only option will be to start renal replacement therapy or go to in center hospice for comfort care only.    Chronic kidney disease, stage IV (severe) ([facility]): Baseline diabetic and hypertensive nephropathy with likely combination of cardiorenal syndrome.    Anemia due to stage 4 chronic kidney disease (HCC): Hemoglobin appears to be fairly stable.    Chronic diastolic congestive heart failure (HCC): We will try to diurese with high-dose diuretics and see if she will respond to it.    Cor pulmonale, chronic (HCC): Longstanding history of untreated sleep apnea might be contributing.    Hypoglycemia: Oral hypoglycemic agents with prolonged effect from renal failure likely the cause.    Bilateral edema of lower extremity: She does not have much lower extremity edema most of the edema is in the abdominal and chest area appears to be having generalized anasarca.    Essential hypertension: Blood pressure is on the low side we will hold off all blood pressure medications we will go ahead and start her on midodrine to maintain blood pressure while aggressively diuresing her.    Acquired hypothyroidism: Continue home Synthroid dose.    Type 2 diabetes mellitus with nephropathy ([facility]): As per hospitalist service.    Cellulitis of both lower extremities: IV antibiotics have been started.    Bradycardia: Currently bradycardic will hold beta-blockers for now.      PLAN:  Renal function is slightly worse, She still has fairly significant edema we will continue IV diuretics will change to Bumex 4 mg mg IV every 6 hours x 2 doses.  No acute indications for dialysis today, I think she is getting to the point where we will have to make a  decision.  I have left a message with her POA Val to call me back.  I will make her n.p.o. after midnight and consult Dr. Shrestha for a tunneled dialysis catheter placement.  It will all still depends if patient agrees to it although Val was fairly clear that she will do dialysis if she has to.  Serum creatinine is slightly worse.  Continue to optimize medications.  Details were discussed with the patient no family.  Left message for Val to call back.  Details were also discussed with the hospitalist service as well as nursing staff.  Continue with rest of the current treatment plan, and monitor with surveillance labs.  Further recommendations will depend on clinical course of the patient during the current hospitalization.     Thank you for involving us in the care of Millicent Mckeon.  Please feel free to call with any questions.    Milad Leone MD  22  11:06 EDT    [facility]  [phone]      Much of this encounter note is an electronic transcription/translation of spoken language to printed text. The electronic translation of spoken language may permit erroneous, or at times, nonsensical words or phrases to be inadvertently transcribed; Although I have reviewed the note for such errors, some may still exist.                 Electronically signed by Milad Leone MD at 22 1115     Doc Keith MD at 22 0618        Cross Cover Note    I was notified of skin lesions on groin; reviewed pictures  -start mupirocin cream 2% TID x 7 days  -start ketoconazole cream 2% BID until resolution of lesions or up to 4 weeks.    Electronically signed by Doc Keith MD at 22 0619     Samson Reyes MD at 22 1126              [facility]    PROGRESS NOTE    Name:  Millicent Mckeon   Age:  61 y.o.  Sex:  female  :  1960  MRN:  [id number]   Visit Number:  [id number]  Admission Date:  3/30/2022  Date Of Service:  22  Primary Care  Physician:  Marianela Albright APRN     LOS: 2 days :    Chief Complaint:      Generalized weakness.    Subjective:    Ms. Mckeon was seen and examined this morning.  She continues to improve with regards to her clinical status.  She is responding well to the diuretic therapy and had about 2600 mL of urine output in the last 24 hours.  Her renal function is slowly improving as well.  She is currently sitting up on the bed and coloring the books.  Denies any chest pain or shortness of breath.    Hospital Course:    Ms. Mckeon is a 61-year-old chronically ill lady with history of atrial fibrillation, chronic systolic heart failure, chronic kidney disease stage IV, chronic cor pulmonale, morbid obesity, functional quadriplegia who is wheelchair-bound was brought to the emergency room by EMS with symptoms of generalized weakness.  Apparently she was found at home disheveled, covered in various stages of skin sores and wounds.  She was discharged from this facility on 3/7/2022 after being treated for severe hypothermia likely environmental associated with hypoglycemia and bilateral lower extremity cellulitis.  Patient apparently lives alone after her sister passed away in October 2021.       In the emergency room, she was afebrile with a pulse of 47 and blood pressure of 121/82 and saturating at 94% on room air.  Blood work done in the emergency room revealed a glucose of 40 with a creatinine of 3.74, BUN of 75, alkaline phosphate of 261 and a hemoglobin of 10.8.  Her proBNP level was 17,302 but troponin was negative.  Chest x-ray showed cardiomegaly with bilateral homogeneous opacity in the lower zones.  Patient was given IV dextrose 1 ampoule in the emergency room.  She was also thought to have bilateral pneumonia and was placed on Rocephin and azithromycin and was admitted to the medical floor with telemetry.  Unfortunately after admission she developed low normal blood pressures and hypothermia.  She was also drowsy.   She was seen by Dr. Cutler and he started her on midodrine and IV Lasix.  A Wilkinson catheter was placed.  Due to suspicion of sepsis, and recent hospitalization for antibody coverage was escalated to Zosyn and vancomycin.  Due to poor IV access, Dr. Guadalupe placed a left internal jugular central venous line on 3/31/2022.  With broadening of the IV antibiotics therapy, midodrine, her blood pressure improved.  She was continued on IV diuretic therapy.  Once her nasal swab for MRSA came back negative, her vancomycin was discontinued.  Patient improved slowly with regards to her urine output.  She was seen by behavioral health services and she declined any help and denied depression.  She was seen by palliative care services.  Patient does not want dialysis and wants to be DNR.  She wants to go home and be comfortable when discharged from the hospital.  She has refused to go to short-term rehabilitation on multiple occasions.    Patient was continued on diuretic therapy with good response and good diuresis.  Her clinical condition as well as the bilateral lower extremity cellulitis improved.    Review of Systems:     All systems were reviewed and negative except as mentioned in subjective, assessment and plan.    Vital Signs:    Temp:  [98.1 °F (36.7 °C)-98.6 °F (37 °C)] 98.1 °F (36.7 °C)  Heart Rate:  [79-90] 88  Resp:  [18-20] 18  BP: (118-132)/(69-83) 127/75    Intake and output:    I/O last 3 completed shifts:  In: 1796 [P.O.:1080; I.V.:716]  Out: 4575 [Urine:4575]  I/O this shift:  In: 480 [P.O.:480]  Out: -     Physical Examination:    General Appearance:  Alert and cooperative. Not in any distress.   Head:  Atraumatic and normocephalic.   Eyes: Conjunctivae and sclerae normal, no icterus. No pallor.   Throat: No oral lesions, no thrush, oral mucosa moist.   Neck: Supple, trachea midline, no thyromegaly.  Left internal jugular central venous line noted.   Lungs:   Breath sounds decreased bilaterally in the bases.  No  wheezing.  Bilateral scattered crackles heard. No Pleural rub or bronchial breathing.   Heart:  Normal S1 and S2, no murmur, no gallop, no rub. No JVD.   Abdomen:   Normal bowel sounds, no masses, no organomegaly. Soft, nontender, obese with a large pannus, no rebound tenderness.  Wilkinson catheter is in place.   Extremities: Supple, peripheral cyanosis in the toes, no clubbing.  3+ pitting edema noted all the way up to her thighs with increased erythema in the lower extremities associated with multiple skin ulcerations and scabs-this seems to have significantly improved since admission.   Skin: No bleeding.  Multiple ulcers noted in the lower extremity with chronic erythema and venous stasis changes.   Neurologic: Alert and oriented x 3. No facial asymmetry. Moves all four limbs but does have generalized weakness. No tremors.      Laboratory results:    Results from last 7 days   Lab Units 04/03/22  0604 04/02/22  0453 04/01/22  0421 03/31/22  0541 03/30/22  1534   SODIUM mmol/L 141 141 140   < > 142   POTASSIUM mmol/L 4.5 4.6 5.4*   < > 4.5   CHLORIDE mmol/L 97* 98 99   < > 100   CO2 mmol/L 21.2* 18.1* 19.4*   < > 24.2   BUN mg/dL 101* 94* 87*   < > 75*   CREATININE mg/dL 4.69* 4.74* 4.46*   < > 3.74*   CALCIUM mg/dL 7.9* 8.1* 7.9*   < > 8.0*   BILIRUBIN mg/dL 0.7  --   --   --  0.7   ALK PHOS U/L 250*  --   --   --  261*   ALT (SGPT) U/L 73*  --   --   --  17   AST (SGOT) U/L 77*  --   --   --  22   GLUCOSE mg/dL 251* 132* 76   < > 40*    < > = values in this interval not displayed.     Results from last 7 days   Lab Units 04/03/22  0604 04/02/22  0453 03/31/22  0541   WBC 10*3/mm3 6.00 6.23 5.67   HEMOGLOBIN g/dL 9.7* 10.2* 10.3*   HEMATOCRIT % 31.8* 34.1 32.9*   PLATELETS 10*3/mm3 77* 88* 107*     Results from last 7 days   Lab Units 04/03/22  0604 04/02/22  0453 03/31/22  1343   INR  1.84* 3.30* 2.54*     Results from last 7 days   Lab Units 03/30/22  1534   TROPONIN T ng/mL 0.025     I have reviewed the  patient's laboratory results.    Radiology results:    No radiology results from the last 24 hrs    Medication Review:     I have reviewed the patient's active and prn medications.     Problem List:      Hypoglycemia    Bilateral edema of lower extremity    Essential hypertension    Acquired hypothyroidism    Type 2 diabetes mellitus with nephropathy (HCC)    Cellulitis of both lower extremities    Anemia due to stage 4 chronic kidney disease (HCC)    Chronic diastolic congestive heart failure (HCC)    Cor pulmonale, chronic (HCC)    Chronic kidney disease, stage IV (severe) ([facility])    Bradycardia    Assessment:    Sepsis with generalized weakness, hypothermia and hypotension secondary to #2, resolved.  Bilateral lower extremity cellulitis, POA, improving.  Acute hypoglycemia, POA, resolved.  Severe bradycardia possibly related to medications in the setting of worsening renal failure, resolved.  Progressive worsening of chronic kidney disease stage IV with volume overload, POA, improving.  Chronic venous stasis of the lower extremities with multiple skin ulcers, POA..  Chronic systolic heart failure with ejection fraction of 35%.  Paroxysmal atrial fibrillation on apixaban.  Chronic cor pulmonale.  Chronic hypoxic respiratory failure on home oxygen.  Diabetes mellitus type 2 with nephropathy.  Morbid obesity with a BMI of 57.  Anemia of chronic kidney disease.  Functional quadriplegia.  Acquired hypothyroidism.    Plan:    Sepsis/hypothermia/hypotension.  -This is likely secondary to bilateral lower extremity cellulitis that was present on admission.  -Continue IV antibiotics therapy with Zosyn (day 3).  -Nasal swab for MRSA was negative. Blood cultures have been negative so far.  -Wound care services have seen the patient for bilateral lower extremity cellulitis/ulcerations and with the local care and IV antibiotics therapy her cellulitis is improving.    Generalized weakness/hypoglycemia/bradycardia.     -Unfortunately, patient has multiple comorbidities and has progressive decline in her health in the last several months.  Her current symptomatology is likely due to a combination of hypoglycemia, bradycardia, worsening renal failure and congestive heart failure.  -Bradycardia has improved and her Toprol-XL has been restarted.  -We will continue to hold amiodarone.    Progressive worsening of chronic kidney disease stage IV.  -She is responding to diuretic therapy very well and having good diuresis.  -Her creatinine is slowly improving as well.  -I have discussed the patient's condition and treatment plan with Dr. Leone.  -We will continue IV Lasix.    Patient is being followed by palliative care services.    Discussed with nursing staff.    DVT Prophylaxis: Apixaban  Code Status: DNR/DNI  Diet: Diabetic renal  Discharge Plan: Pending-SNF with hospice versus home with hospice.      Samson Reyes MD  22  11:26 EDT    Dictated utilizing Dragon dictation.      Electronically signed by Samson Reyes MD at 22 1206     Milad Leone MD at 22 0943                [facility] Progress [facility]  [facility] [place]        Patient Name: Millicent Mckeon  : 1960  MRN: [id number]   LOS: 2 days    Patient Care Team:  Marianela Albright APRN as PCP - General (Family Medicine)  Geremias Shepherd MD as Consulting Physician (General Surgery)  Lisa Cardoso MD as Surgeon (General Surgery)    Chief Complaint:    Chief Complaint   Patient presents with   • Weakness - Generalized     Unable to stand     Primary Care Physician:  Marianela Albright APRN  Date of admission: 3/30/2022    Subjective     Interval History:   Events noted from last 24 hours.  Currently she does appears to be doing better, lot more alert and interactive.  Today she again said that she does not want dialysis.  When I asked her about Val who is the POA, she said she was here today and had discussion with her.   Although she denies that she discuss anything about dialysis.  She said if she needs dialysis she will have to do it.  Although it does not appear that she needs it today but most likely will end up requiring dialysis this admission.  I will let them discussed this again.  She denies having any chest pain or shortness of breath.  No fever.  She keeps on repeating that she wants to go home.    Review of Systems:   As noted above    Objective     Vitals:   Temp:  [96.7 °F (35.9 °C)-98.6 °F (37 °C)] 98.1 °F (36.7 °C)  Heart Rate:  [79-90] 80  Resp:  [18-20] 18  BP: (118-132)/(69-83) 120/69  Flow (L/min):  [2] 2    Intake/Output Summary (Last 24 hours) at 4/3/2022 0943  Last data filed at 4/3/2022 0900  Gross per 24 hour   Intake 953 ml   Output 2600 ml   Net -1647 ml       Physical Exam:    General Appearance: alert,  no acute distress   Skin: warm and dry  HEENT: oral mucosa normal, nonicteric sclera  Neck: supple, no JVD  Lungs: CTA  Heart: RRR, normal S1 and S2  Abdomen: Obese, soft to firm, nontender, nondistended  : no palpable bladder  Extremities: 2-3+ edema, mostly it is dependent edema, no cyanosis or clubbing  Neuro: He is able to communicate and randomly moves extremities.    Scheduled Meds:     Current Facility-Administered Medications   Medication Dose Route Frequency Provider Last Rate Last Admin   • acetaminophen (TYLENOL) tablet 650 mg  650 mg Oral Q4H PRN Samson Reyes MD   650 mg at 03/31/22 0629    Or   • acetaminophen (TYLENOL) 160 MG/5ML solution 650 mg  650 mg Oral Q4H PRN Samson Reyes MD        Or   • acetaminophen (TYLENOL) suppository 650 mg  650 mg Rectal Q4H PRN Samson Reyes MD       • aspirin chewable tablet 81 mg  81 mg Oral Daily Samson Reyes MD   81 mg at 04/03/22 0939   • b complex-vitamin c-folic acid (NEPHRO-LOIS) tablet 1 tablet  1 tablet Oral Daily Samson Reyes MD   1 tablet at 04/03/22 0939   • dextrose (D50W) (25 g/50 mL) IV injection 25 g  25 g Intravenous Q15 Min PRN Amy,  MD Samson       • dextrose (D50W) (25 g/50 mL) IV injection 50 mL  50 mL Intravenous Q1H PRN Samson Reyes MD   50 mL at 03/30/22 2159   • dextrose (D50W) (25 g/50 mL) IV injection 50 mL  50 mL Intravenous Q1H PRN Jerry Vargas PA-C   50 mL at 03/31/22 0630   • dextrose (GLUTOSE) oral gel 1 tube  1 tube Oral Q15 Min PRN Samson Reyes MD       • docusate sodium (COLACE) capsule 100 mg  100 mg Oral BID Samson Reyes MD   100 mg at 04/03/22 0939   • ferrous sulfate EC tablet 324 mg  324 mg Oral BID With Meals Samson Reyes MD   324 mg at 04/03/22 0939   • glucagon (human recombinant) (GLUCAGEN DIAGNOSTIC) injection 1 mg  1 mg Subcutaneous PRN Samson Reyes MD       • guaiFENesin (MUCINEX) 12 hr tablet 600 mg  600 mg Oral Q12H Samson Reyes MD   600 mg at 04/03/22 0939   • hydrALAZINE (APRESOLINE) tablet 25 mg  25 mg Oral Q8H Milad Leone MD   25 mg at 04/03/22 0533   • ipratropium-albuterol (DUO-NEB) nebulizer solution 3 mL  3 mL Nebulization Q4H PRN Samson Reyes MD       • isosorbide mononitrate (IMDUR) 24 hr tablet 30 mg  30 mg Oral Daily Samson Reyes MD   30 mg at 04/03/22 0938   • lactobacillus acidophilus (RISAQUAD) capsule 1 capsule  1 capsule Oral BID Samson Reyes MD   1 capsule at 04/03/22 0939   • levothyroxine (SYNTHROID, LEVOTHROID) tablet 150 mcg  150 mcg Oral Daily Samson Reyes MD   150 mcg at 04/03/22 0938   • lidocaine (LIDODERM) 5 % 1 patch  1 patch Transdermal Q24H Samson Reyes MD   1 patch at 04/02/22 2017   • metoprolol succinate XL (TOPROL-XL) 24 hr tablet 100 mg  100 mg Oral Q24H Samson Reyes MD   100 mg at 04/03/22 0939   • ondansetron (ZOFRAN) injection 4 mg  4 mg Intravenous Q6H PRN Samson Reyes MD       • pantoprazole (PROTONIX) EC tablet 40 mg  40 mg Oral Daily Samson Reyes MD   40 mg at 04/03/22 0939   • Pharmacy to Dose Zosyn   Does not apply Continuous PRN Samson Reyes MD       • piperacillin-tazobactam (ZOSYN) 4.5 g in iso-osmotic dextrose 100 mL IVPB (premix)   4.5 g Intravenous Q12H Eitan, Charles, [facility]   4.5 g at 04/03/22 0533   • silver sulfadiazine (SILVADENE, SSD) 1 % cream 1 application  1 application Topical Q12H Samson Reyes MD   1 application at 04/02/22 2034   • sodium chloride 0.9 % flush 10 mL  10 mL Intravenous PRN Samson Reyes MD       • sodium chloride 0.9 % flush 10 mL  10 mL Intravenous Q12H Samson Reyes MD   10 mL at 04/02/22 0905   • sodium chloride 0.9 % flush 10 mL  10 mL Intravenous Q12H Samson Reyes MD   10 mL at 04/02/22 2018   • sodium chloride 0.9 % flush 10 mL  10 mL Intravenous Q12H Samson Reyes MD   10 mL at 04/03/22 0939   • sodium chloride 0.9 % flush 10 mL  10 mL Intravenous PRN Samson Reyes MD       • sodium chloride 0.9 % flush 20 mL  20 mL Intravenous PRN Samson Reyes MD       • sodium chloride 0.9 % flush 3 mL  3 mL Intravenous Q12H Samson Reyes MD   3 mL at 04/02/22 0906   • sodium chloride 0.9 % flush 3-10 mL  3-10 mL Intravenous PRN Samson Reyes MD           aspirin, 81 mg, Oral, Daily  b complex-vitamin c-folic acid, 1 tablet, Oral, Daily  docusate sodium, 100 mg, Oral, BID  ferrous sulfate, 324 mg, Oral, BID With Meals  guaiFENesin, 600 mg, Oral, Q12H  hydrALAZINE, 25 mg, Oral, Q8H  isosorbide mononitrate, 30 mg, Oral, Daily  lactobacillus acidophilus, 1 capsule, Oral, BID  levothyroxine, 150 mcg, Oral, Daily  lidocaine, 1 patch, Transdermal, Q24H  metoprolol succinate XL, 100 mg, Oral, Q24H  pantoprazole, 40 mg, Oral, Daily  piperacillin-tazobactam, 4.5 g, Intravenous, Q12H  silver sulfadiazine, 1 application, Topical, Q12H  sodium chloride, 10 mL, Intravenous, Q12H  sodium chloride, 10 mL, Intravenous, Q12H  sodium chloride, 10 mL, Intravenous, Q12H  sodium chloride, 3 mL, Intravenous, Q12H        IV Meds:   Pharmacy to Dose Zosyn,         Results Reviewed:   I have personally reviewed the results from the time of this admission to 4/3/2022 09:43 EDT     Results from last 7 days   Lab Units 04/03/22  0604 04/02/22  0453  04/01/22 0421 03/31/22  0541 03/30/22  1534   SODIUM mmol/L 141 141 140   < > 142   POTASSIUM mmol/L 4.5 4.6 5.4*   < > 4.5   CHLORIDE mmol/L 97* 98 99   < > 100   CO2 mmol/L 21.2* 18.1* 19.4*   < > 24.2   BUN mg/dL 101* 94* 87*   < > 75*   CREATININE mg/dL 4.69* 4.74* 4.46*   < > 3.74*   CALCIUM mg/dL 7.9* 8.1* 7.9*   < > 8.0*   BILIRUBIN mg/dL 0.7  --   --   --  0.7   ALK PHOS U/L 250*  --   --   --  261*   ALT (SGPT) U/L 73*  --   --   --  17   AST (SGOT) U/L 77*  --   --   --  22   GLUCOSE mg/dL 251* 132* 76   < > 40*    < > = values in this interval not displayed.       Estimated Creatinine Clearance: 18.5 mL/min (A) (by C-G formula based on SCr of 4.69 mg/dL (H)).    Results from last 7 days   Lab Units 04/02/22 0453 04/01/22 0421 03/30/22  1534   MAGNESIUM mg/dL  --   --  2.5*   PHOSPHORUS mg/dL 6.6* 6.7*  --              Results from last 7 days   Lab Units 04/03/22  0604 04/02/22 0453 03/31/22  0541 03/30/22  1534   WBC 10*3/mm3 6.00 6.23 5.67 4.54   HEMOGLOBIN g/dL 9.7* 10.2* 10.3* 10.8*   PLATELETS 10*3/mm3 77* 88* 107* 106*       Results from last 7 days   Lab Units 04/03/22  0604 04/02/22 0453 03/31/22  1343   INR  1.84* 3.30* 2.54*       Brief Urine Lab Results  (Last result in the past 365 days)      Color   Clarity   Blood   Leuk Est   Nitrite   Protein   CREAT   Urine HCG        03/30/22 1705 Yellow   Clear   Trace   Negative   Negative   >=300 mg/dL (3+)                 No results found for: UTPCR    Imaging Results (Last 24 Hours)     ** No results found for the last 24 hours. **              Assessment / Plan     ASSESSMENT:    Acute kidney injury: It is quite likely that she has worsening renal function secondary to her baseline worsening of renal function, it is quite possible that she has not been taking her medications in has worsening cardiorenal syndrome.  I will go ahead and optimize the medications and see if we can get some improvement in her renal function.  If no improvement her  only option will be to start renal replacement therapy or go to in center hospice for comfort care only.    Chronic kidney disease, stage IV (severe) (HCC): Baseline diabetic and hypertensive nephropathy with likely combination of cardiorenal syndrome.    Anemia due to stage 4 chronic kidney disease (HCC): Hemoglobin appears to be fairly stable.    Chronic diastolic congestive heart failure (HCC): We will try to diurese with high-dose diuretics and see if she will respond to it.    Cor pulmonale, chronic (HCC): Longstanding history of untreated sleep apnea might be contributing.    Hypoglycemia: Oral hypoglycemic agents with prolonged effect from renal failure likely the cause.    Bilateral edema of lower extremity: She does not have much lower extremity edema most of the edema is in the abdominal and chest area appears to be having generalized anasarca.    Essential hypertension: Blood pressure is on the low side we will hold off all blood pressure medications we will go ahead and start her on midodrine to maintain blood pressure while aggressively diuresing her.    Acquired hypothyroidism: Continue home Synthroid dose.    Type 2 diabetes mellitus with nephropathy (HCC): As per hospitalist service.    Cellulitis of both lower extremities: IV antibiotics have been started.    Bradycardia: Currently bradycardic will hold beta-blockers for now.      PLAN:  Renal function is slightly worse, increasing urine output noted.  She still has fairly significant edema we will continue IV diuretics will change Lasix to 200 mg IV every 8 hours x 2 doses.  No acute indications for dialysis today.  Serum creatinine is slightly better.  Continue to optimize medications.  Details were discussed with the patient no family.    Details were also discussed with the hospitalist service as well as nursing staff.  Continue with rest of the current treatment plan, and monitor with surveillance labs.  Further recommendations will depend on  clinical course of the patient during the current hospitalization.     Thank you for involving us in the care of Millicent Mckeon.  Please feel free to call with any questions.    Milad Leone MD  22  09:43 EDT    [facility]  [phone]      Much of this encounter note is an electronic transcription/translation of spoken language to printed text. The electronic translation of spoken language may permit erroneous, or at times, nonsensical words or phrases to be inadvertently transcribed; Although I have reviewed the note for such errors, some may still exist.                 Electronically signed by Milad Leone MD at 22 1036     Samson Reyes MD at 22 1300              [facility]    PROGRESS NOTE    Name:  Millicent Mckeon   Age:  61 y.o.  Sex:  female  :  1960  MRN:  [id number]   Visit Number:  [id number]  Admission Date:  3/30/2022  Date Of Service:  22  Primary Care Physician:  Marianela Albright APRN     LOS: 1 day :    Chief Complaint:      Generalized weakness.    Subjective:    Ms. Mckeon was seen and examined this morning.  She looks more perky today and clinically looks improved with regards to her color and mental status.  She was able to answer all my questions appropriately.  She is still adamant that she does not want dialysis and wants to be DNR.  She states that she just wants to go home and be comfortable.  She has had improved urine output with 3600 mL in the last 24 hours with help of diuresis.  She is being followed by Dr. Cutler.  No significant overnight events.    Hospital Course:    Ms. Mckeon is a 61-year-old chronically ill lady with history of atrial fibrillation, chronic systolic heart failure, chronic kidney disease stage IV, chronic cor pulmonale, morbid obesity, functional quadriplegia who is wheelchair-bound was brought to the emergency room by EMS with symptoms of generalized weakness.  Apparently  she was found at home disheveled, covered in various stages of skin sores and wounds.  She was discharged from this facility on 3/7/2022 after being treated for severe hypothermia likely environmental associated with hypoglycemia and bilateral lower extremity cellulitis.  Patient apparently lives alone after her sister passed away in October 2021.       In the emergency room, she was afebrile with a pulse of 47 and blood pressure of 121/82 and saturating at 94% on room air.  Blood work done in the emergency room revealed a glucose of 40 with a creatinine of 3.74, BUN of 75, alkaline phosphate of 261 and a hemoglobin of 10.8.  Her proBNP level was 17,302 but troponin was negative.  Chest x-ray showed cardiomegaly with bilateral homogeneous opacity in the lower zones.  Patient was given IV dextrose 1 ampoule in the emergency room.  She was also thought to have bilateral pneumonia and was placed on Rocephin and azithromycin and was admitted to the medical floor with telemetry.  Unfortunately after admission she developed low normal blood pressures and hypothermia.  She was also drowsy.  She was seen by Dr. Cutler and he started her on midodrine and IV Lasix.  A Wilkinson catheter was placed.  Due to suspicion of sepsis, and recent hospitalization for antibody coverage was escalated to Zosyn and vancomycin.  Due to poor IV access, Dr. Guadalupe placed a left internal jugular central venous line on 3/31/2022.  With broadening of the IV antibiotics therapy, midodrine, her blood pressure improved.  She was continued on IV diuretic therapy.  Once her nasal swab for MRSA came back negative, her vancomycin was discontinued.  Patient improved slowly with regards to her urine output.  She was seen by behavioral health services and she declined any help and denied depression.  She was seen by palliative care services.  Patient does not want dialysis and wants to be DNR.  She wants to go home and be comfortable when discharged from the  [facility].  She has refused to go to short-term rehabilitation on multiple occasions.    Review of Systems:     All systems were reviewed and negative except as mentioned in subjective, assessment and plan.    Vital Signs:    Temp:  [96.7 °F (35.9 °C)-97.4 °F (36.3 °C)] 96.7 °F (35.9 °C)  Heart Rate:  [93-96] 94  Resp:  [16-26] 20  BP: (116-148)/() 116/76    Intake and output:    I/O last 3 completed shifts:  In: 1732.5 [P.O.:660; I.V.:1072.5]  Out: 3950 [Urine:3950]  I/O this shift:  In: 600 [P.O.:600]  Out: -     Physical Examination:    General Appearance:  Alert and cooperative. Not in any distress.   Head:  Atraumatic and normocephalic.   Eyes: Conjunctivae and sclerae normal, no icterus. No pallor.   Throat: No oral lesions, no thrush, oral mucosa moist.   Neck: Supple, trachea midline, no thyromegaly.  Left internal jugular central venous line noted.   Lungs:   Breath sounds decreased bilaterally in the bases.  No wheezing.  Bilateral scattered crackles heard. No Pleural rub or bronchial breathing.   Heart:  Normal S1 and S2, no murmur, no gallop, no rub. No JVD.   Abdomen:   Normal bowel sounds, no masses, no organomegaly. Soft, nontender, obese with a large pannus, no rebound tenderness.  Wilkinson catheter is in place.   Extremities: Supple, peripheral cyanosis in the toes, no clubbing.  3+ pitting edema noted all the way up to her thighs with increased erythema and peripheral cyanosis in the lower extremities associated with multiple skin ulcerations and scabs.   Skin: No bleeding.  Multiple ulcers noted in the lower extremity with chronic erythema and venous stasis changes.   Neurologic: Alert and oriented x 3. No facial asymmetry. Moves all four limbs but does have generalized weakness. No tremors.      Laboratory results:    Results from last 7 days   Lab Units 04/02/22  0453 04/01/22  0421 03/31/22  0541 03/30/22  1534   SODIUM mmol/L 141 140 141 142   POTASSIUM mmol/L 4.6 5.4* 4.9 4.5   CHLORIDE  mmol/L 98 99 101 100   CO2 mmol/L 18.1* 19.4* 21.8* 24.2   BUN mg/dL 94* 87* 85* 75*   CREATININE mg/dL 4.74* 4.46* 4.14* 3.74*   CALCIUM mg/dL 8.1* 7.9* 7.9* 8.0*   BILIRUBIN mg/dL  --   --   --  0.7   ALK PHOS U/L  --   --   --  261*   ALT (SGPT) U/L  --   --   --  17   AST (SGOT) U/L  --   --   --  22   GLUCOSE mg/dL 132* 76 83 40*     Results from last 7 days   Lab Units 04/02/22  0453 03/31/22  0541 03/30/22  1534   WBC 10*3/mm3 6.23 5.67 4.54   HEMOGLOBIN g/dL 10.2* 10.3* 10.8*   HEMATOCRIT % 34.1 32.9* 35.1   PLATELETS 10*3/mm3 88* 107* 106*     Results from last 7 days   Lab Units 04/02/22  0453 03/31/22  1343   INR  3.30* 2.54*     Results from last 7 days   Lab Units 03/30/22  1534   TROPONIN T ng/mL 0.025     I have reviewed the patient's laboratory results.    Radiology results:    XR Chest 1 View    Result Date: 3/31/2022  FINAL REPORT CLINICAL HISTORY: central line placement verification FINDINGS: COMPARISON: None   FINDINGS: The heart size is enlarged.  There is a left internal jugular line with the tip in the region of the distal SVC.  Patient is rotated to the right.  Multiple overlying EKG leads noted.  There is pulmonary vascular congestion.  No definite pleural effusions seen..  The mediastinum is within normal limits.  There are mild, bilateral perihilar infiltrates suggesting mild CHF.  There is no pleural effusion.  There is no pneumothorax.  The bony thorax is intact.     Impression: Left internal jugular catheter with tip in the region of the distal SVC; no pneumothorax identified.  Evidence of mild congestive heart failure. Authenticated by Cookie Abarca MD on 03/31/2022 06:13:47 PM    I have reviewed the patient's radiology reports.    Medication Review:     I have reviewed the patient's active and prn medications.     Problem List:      Hypoglycemia    Bilateral edema of lower extremity    Essential hypertension    Acquired hypothyroidism    Type 2 diabetes mellitus with nephropathy  (HCC)    Cellulitis of both lower extremities    Anemia due to stage 4 chronic kidney disease (HCC)    Chronic diastolic congestive heart failure (HCC)    Cor pulmonale, chronic (HCC)    Chronic kidney disease, stage IV (severe) (HCC)    Bradycardia    Assessment:    Sepsis with generalized weakness, hypothermia and hypotension secondary to #2.  Bilateral lower extremity cellulitis, POA.  Acute hypoglycemia, POA, improved.  Severe bradycardia possibly related to medications in the setting of worsening renal failure, improved.  Progressive worsening of chronic kidney disease stage IV with volume overload, POA.  Chronic venous stasis of the lower extremities with multiple skin ulcers, POA..  Chronic systolic heart failure with ejection fraction of 35%.  Paroxysmal atrial fibrillation on apixaban.  Chronic cor pulmonale.  Chronic hypoxic respiratory failure on home oxygen.  Diabetes mellitus type 2 with nephropathy.  Morbid obesity with a BMI of 57.  Anemia of chronic kidney disease.  Functional quadriplegia.  Acquired hypothyroidism.    Plan:    Sepsis/hypothermia/hypotension.  -This is likely secondary to bilateral lower extremity cellulitis that was present on admission.  -Continue IV antibiotics therapy with Zosyn (day 2).  -Nasal swab for MRSA was negative.  Blood cultures have been negative so far.  -Wound care services have seen the patient for bilateral lower extremity cellulitis and ulcerations.    Generalized weakness/hypoglycemia/bradycardia.  -Unfortunately, patient has multiple comorbidities and has progressive decline in her health in the last several months.  Her current symptomatology is likely due to a combination of hypoglycemia, bradycardia, worsening renal failure and congestive heart failure.  -Bradycardia has improved and her Toprol-XL has been restarted.  -We will continue to hold amiodarone.    Progressive worsening of chronic kidney disease stage IV.  -Patient does have significant volume  overload secondary to worsening CKD.  -I have discussed the patient's condition and treatment plan with Dr. Leone.  -We will continue Lasix another day today.    Patient is being followed by palliative care services.    Discussed with nursing staff at the bedside.  I did discuss the patient's condition with behavioral health services yesterday.    DVT Prophylaxis: Apixaban  Code Status: DNR/DNI  Diet: Diabetic renal  Discharge Plan: Pending-SNF with hospice versus home with hospice.      Samson Reyes MD  22  13:00 EDT    Dictated utilizing Dragon dictation.      Electronically signed by Samson Reyes MD at 22 1306     Milad Leone MD at 22 0972                [facility] Progress Note  [facility]. [place]        Patient Name: Millicent Mckeon  : 1960  MRN: [id number]   LOS: 1 day    Patient Care Team:  Marianela Albright APRN as PCP - General (Family Medicine)  Geremias Shepherd MD as Consulting Physician (General Surgery)  Lisa Cardoso MD as Surgeon (General Surgery)    Chief Complaint:    Chief Complaint   Patient presents with   • Weakness - Generalized     Unable to stand     Primary Care Physician:  Marianela Albright APRN  Date of admission: 3/30/2022    Subjective     Interval History:   Events noted from last 24 hours.  Currently she does appears to be doing better, lot more alert and interactive.  Today she again said that she does not want dialysis.  When I asked her about Val who is the POA she says she never came in to see her.  She said if she needs dialysis she will have to do it.  Although it does not appear that she needs it today but most likely will end up requiring dialysis this admission.  I will let them discussed this again during the day and see how she will respond over the next 2 days and make the final decision depending on what they decide and how her blood work looks.  She denies having any chest pain or shortness of breath.   No fever.  She keeps on repeating that she wants to go home.    Review of Systems:   As noted above    Objective     Vitals:   Temp:  [97.3 °F (36.3 °C)-98.4 °F (36.9 °C)] 97.3 °F (36.3 °C)  Heart Rate:  [93-98] 94  Resp:  [16-26] 20  BP: (116-148)/() 116/76  Flow (L/min):  [2] 2    Intake/Output Summary (Last 24 hours) at 4/2/2022 0951  Last data filed at 4/2/2022 0933  Gross per 24 hour   Intake 1507.51 ml   Output 2850 ml   Net -1342.49 ml       Physical Exam:    General Appearance: alert,  no acute distress   Skin: warm and dry  HEENT: oral mucosa normal, nonicteric sclera  Neck: supple, no JVD  Lungs: CTA  Heart: RRR, normal S1 and S2  Abdomen: Obese, soft to firm, nontender, nondistended  : no palpable bladder  Extremities: 2-3+ edema, mostly it is dependent edema, no cyanosis or clubbing  Neuro: He is able to communicate and randomly moves extremities.    Scheduled Meds:     Current Facility-Administered Medications   Medication Dose Route Frequency Provider Last Rate Last Admin   • acetaminophen (TYLENOL) tablet 650 mg  650 mg Oral Q4H PRN Samson Reyes MD   650 mg at 03/31/22 0629    Or   • acetaminophen (TYLENOL) 160 MG/5ML solution 650 mg  650 mg Oral Q4H PRN Samson Reyes MD        Or   • acetaminophen (TYLENOL) suppository 650 mg  650 mg Rectal Q4H PRN Samson Reyes MD       • aspirin chewable tablet 81 mg  81 mg Oral Daily Samson Reyes MD   81 mg at 04/02/22 0904   • azithromycin (ZITHROMAX) tablet 250 mg  250 mg Oral Q24H Samson Reyes MD   250 mg at 04/02/22 0904   • b complex-vitamin c-folic acid (NEPHRO-LOIS) tablet 1 tablet  1 tablet Oral Daily Samson Reyes MD   1 tablet at 04/02/22 0908   • dextrose (D50W) (25 g/50 mL) IV injection 25 g  25 g Intravenous Q15 Min PRN Samson Reyes MD       • dextrose (D50W) (25 g/50 mL) IV injection 50 mL  50 mL Intravenous Q1H PRN Samson Reyes MD   50 mL at 03/30/22 2159   • dextrose (D50W) (25 g/50 mL) IV injection 50 mL  50 mL Intravenous Q1H PRN  Jerry Vargas PA-C   50 mL at 03/31/22 0630   • dextrose (GLUTOSE) oral gel 1 tube  1 tube Oral Q15 Min PRN Samson Reyes MD       • docusate sodium (COLACE) capsule 100 mg  100 mg Oral BID Samson Reyes MD   100 mg at 04/02/22 0905   • ferrous sulfate EC tablet 324 mg  324 mg Oral BID With Meals Samson Reyes MD   324 mg at 04/02/22 0904   • glucagon (human recombinant) (GLUCAGEN DIAGNOSTIC) injection 1 mg  1 mg Subcutaneous PRN Samson Reyes MD       • guaiFENesin (MUCINEX) 12 hr tablet 600 mg  600 mg Oral Q12H Samson Reyes MD   600 mg at 04/02/22 0905   • hydrALAZINE (APRESOLINE) tablet 25 mg  25 mg Oral Q8H Milad Leone MD   25 mg at 04/02/22 0643   • ipratropium-albuterol (DUO-NEB) nebulizer solution 3 mL  3 mL Nebulization Q4H PRN Samson Reyes MD       • isosorbide mononitrate (IMDUR) 24 hr tablet 30 mg  30 mg Oral Daily Samson Reyes MD   30 mg at 04/02/22 0904   • lactobacillus acidophilus (RISAQUAD) capsule 1 capsule  1 capsule Oral BID Samson Reyes MD   1 capsule at 04/02/22 0904   • levothyroxine (SYNTHROID, LEVOTHROID) tablet 150 mcg  150 mcg Oral Daily Samson Reyes MD   150 mcg at 04/02/22 0904   • lidocaine (LIDODERM) 5 % 1 patch  1 patch Transdermal Q24H Samson Reyes MD   1 patch at 04/01/22 2140   • metoprolol succinate XL (TOPROL-XL) 24 hr tablet 50 mg  50 mg Oral Q24H Samson Reyes MD   50 mg at 04/02/22 0904   • ondansetron (ZOFRAN) injection 4 mg  4 mg Intravenous Q6H PRN Samson Reyes MD       • pantoprazole (PROTONIX) EC tablet 40 mg  40 mg Oral Daily Samson Reyes MD   40 mg at 04/02/22 0905   • Pharmacy to Dose Zosyn   Does not apply Continuous PRN Samson Reyes MD       • piperacillin-tazobactam (ZOSYN) 4.5 g in iso-osmotic dextrose 100 mL IVPB (premix)  4.5 g Intravenous Q12H Charles Laird RPH   4.5 g at 04/02/22 0430   • silver sulfadiazine (SILVADENE, SSD) 1 % cream 1 application  1 application Topical Q12H Samson Reyes MD   1 application at 04/02/22 0906   • sodium  chloride 0.9 % flush 10 mL  10 mL Intravenous PRN Samson Reyes MD       • sodium chloride 0.9 % flush 10 mL  10 mL Intravenous Q12H Samson Reyes MD   10 mL at 04/02/22 0905   • sodium chloride 0.9 % flush 10 mL  10 mL Intravenous Q12H Samson Reyes MD   10 mL at 04/02/22 0905   • sodium chloride 0.9 % flush 10 mL  10 mL Intravenous Q12H Samson Reyes MD   10 mL at 04/02/22 0905   • sodium chloride 0.9 % flush 10 mL  10 mL Intravenous PRN Samson Reyes MD       • sodium chloride 0.9 % flush 20 mL  20 mL Intravenous PRN Samson Reyes MD       • sodium chloride 0.9 % flush 3 mL  3 mL Intravenous Q12H Samson Reyes MD   3 mL at 04/02/22 0906   • sodium chloride 0.9 % flush 3-10 mL  3-10 mL Intravenous PRN Samson Reyes MD           aspirin, 81 mg, Oral, Daily  azithromycin, 250 mg, Oral, Q24H  b complex-vitamin c-folic acid, 1 tablet, Oral, Daily  docusate sodium, 100 mg, Oral, BID  ferrous sulfate, 324 mg, Oral, BID With Meals  guaiFENesin, 600 mg, Oral, Q12H  hydrALAZINE, 25 mg, Oral, Q8H  isosorbide mononitrate, 30 mg, Oral, Daily  lactobacillus acidophilus, 1 capsule, Oral, BID  levothyroxine, 150 mcg, Oral, Daily  lidocaine, 1 patch, Transdermal, Q24H  metoprolol succinate XL, 50 mg, Oral, Q24H  pantoprazole, 40 mg, Oral, Daily  piperacillin-tazobactam, 4.5 g, Intravenous, Q12H  silver sulfadiazine, 1 application, Topical, Q12H  sodium chloride, 10 mL, Intravenous, Q12H  sodium chloride, 10 mL, Intravenous, Q12H  sodium chloride, 10 mL, Intravenous, Q12H  sodium chloride, 3 mL, Intravenous, Q12H        IV Meds:   Pharmacy to Dose Zosyn,         Results Reviewed:   I have personally reviewed the results from the time of this admission to 4/2/2022 09:51 EDT     Results from last 7 days   Lab Units 04/02/22  0453 04/01/22  0421 03/31/22  0541 03/30/22  1534   SODIUM mmol/L 141 140 141 142   POTASSIUM mmol/L 4.6 5.4* 4.9 4.5   CHLORIDE mmol/L 98 99 101 100   CO2 mmol/L 18.1* 19.4* 21.8* 24.2   BUN mg/dL 94* 87* 85*  75*   CREATININE mg/dL 4.74* 4.46* 4.14* 3.74*   CALCIUM mg/dL 8.1* 7.9* 7.9* 8.0*   BILIRUBIN mg/dL  --   --   --  0.7   ALK PHOS U/L  --   --   --  261*   ALT (SGPT) U/L  --   --   --  17   AST (SGOT) U/L  --   --   --  22   GLUCOSE mg/dL 132* 76 83 40*       Estimated Creatinine Clearance: 18.1 mL/min (A) (by C-G formula based on SCr of 4.74 mg/dL (H)).    Results from last 7 days   Lab Units 04/02/22  0453 04/01/22  0421 03/30/22  1534   MAGNESIUM mg/dL  --   --  2.5*   PHOSPHORUS mg/dL 6.6* 6.7*  --              Results from last 7 days   Lab Units 04/02/22  0453 03/31/22  0541 03/30/22  1534   WBC 10*3/mm3 6.23 5.67 4.54   HEMOGLOBIN g/dL 10.2* 10.3* 10.8*   PLATELETS 10*3/mm3 88* 107* 106*       Results from last 7 days   Lab Units 04/02/22  0453 03/31/22  1343   INR  3.30* 2.54*       Brief Urine Lab Results  (Last result in the past 365 days)      Color   Clarity   Blood   Leuk Est   Nitrite   Protein   CREAT   Urine HCG        03/30/22 1705 Yellow   Clear   Trace   Negative   Negative   >=300 mg/dL (3+)                 No results found for: UTPCR    Imaging Results (Last 24 Hours)     ** No results found for the last 24 hours. **              Assessment / Plan     ASSESSMENT:    Acute kidney injury: It is quite likely that she has worsening renal function secondary to her baseline worsening of renal function, it is quite possible that she has not been taking her medications in has worsening cardiorenal syndrome.  I will go ahead and optimize the medications and see if we can get some improvement in her renal function.  If no improvement her only option will be to start renal replacement therapy or go to in center hospice for comfort care only.    Chronic kidney disease, stage IV (severe) ([facility]): Baseline diabetic and hypertensive nephropathy with likely combination of cardiorenal syndrome.    Anemia due to stage 4 chronic kidney disease (HCC): Hemoglobin appears to be fairly stable.    Chronic diastolic  congestive heart failure (HCC): We will try to diurese with high-dose diuretics and see if she will respond to it.    Cor pulmonale, chronic (HCC): Longstanding history of untreated sleep apnea might be contributing.    Hypoglycemia: Oral hypoglycemic agents with prolonged effect from renal failure likely the cause.    Bilateral edema of lower extremity: She does not have much lower extremity edema most of the edema is in the abdominal and chest area appears to be having generalized anasarca.    Essential hypertension: Blood pressure is on the low side we will hold off all blood pressure medications we will go ahead and start her on midodrine to maintain blood pressure while aggressively diuresing her.    Acquired hypothyroidism: Continue home Synthroid dose.    Type 2 diabetes mellitus with nephropathy (HCC): As per hospitalist service.    Cellulitis of both lower extremities: IV antibiotics have been started.    Bradycardia: Currently bradycardic will hold beta-blockers for now.      PLAN:  Renal function is slightly worse, increasing urine output noted.  She still has fairly significant edema we will continue IV diuretics will change Lasix to 80 mg IV every 6 hours x3 doses.  No acute indications for dialysis today.  Continue to optimize medications.  Details were discussed with the patient no family.    Details were also discussed with the hospitalist service as well as nursing staff.  Continue with rest of the current treatment plan, and monitor with surveillance labs.  Further recommendations will depend on clinical course of the patient during the current hospitalization.     Thank you for involving us in the care of Millicent Mckeon.  Please feel free to call with any questions.    Milad Leone MD  04/02/22  09:51 EDT    [facility]  [phone]      Much of this encounter note is an electronic transcription/translation of spoken language to printed text. The electronic translation  "of spoken language may permit erroneous, or at times, nonsensical words or phrases to be inadvertently transcribed; Although I have reviewed the note for such errors, some may still exist.                 Electronically signed by Milad Leone MD at 04/02/22 1115          Physical Therapy Notes (last 72 hours)      Maribel Dorman PT at 04/01/22 1156  Version 1 of 1       Patient declines working with therapy today, she is not feeling well enough to participate.  PT will follow up tomorrow.      Electronically signed by Maribel Dorman PT at 04/01/22 1159     Xochitl Bradford PT at 04/02/22 1005  Version 1 of 1       Attempted PT evaluation this a.m. with pt initially agreeing to work with therapist. Pt alert and oriented x 4. At this time, physician stopped by to talk about possibly beginning dialysis. Pt refused to have dialysis and then stated she did not want PT either. Pt kept repeating  \"I just want to go home\". Pt's niece is to stop by and talk with pt about her wishes for a continued medical plan. Therapist will check back tomorrow and proceed as pt decided.      Electronically signed by Xochitl Bradford PT at 04/02/22 1018     Xochitl Bradford PT at 04/03/22 1011  Version 1 of 1       Goal Outcome Evaluation:  Plan of Care Reviewed With: patient, other (see comments) (pt's niece)           Outcome Evaluation: PT evaluation completed this date with pt alert and oriented x 4 as well as cooperative with assessment. Pt's niece present at beginning of the evaluation and post discussin with her neice, pt is now willing to have dialysis if needed as well as work with therapy with the goal of getting back to her home. Pt performed bed mobility with min assist to roll and max asssit + 2 PA for boosting up in bed. Pt did not think she she was able yet to sit on EOB so LE ther ex began as per flowsheet. Pt presents with deficits in strength and endurance as well balance. Pt is expected to improve her functional mobility with " continued PT services prior to d/c.    Electronically signed by Xochitl Bradford, PT at 22 1305     Xochitl Bradford, PT at 22 1011  Version 1 of 1         Patient Name: Millicent Mckeon  : 1960    MRN: [id number]                              Today's Date: 4/3/2022       Admit Date: 3/30/2022    Visit Dx:     ICD-10-CM ICD-9-CM   1. Hypoglycemia  E16.2 251.2   2. Bradycardia  R00.1 427.89   3. Failure to thrive in adult  R62.7 783.7   4. Pneumonia of right lung due to infectious organism, unspecified part of lung  J18.9 483.8     Patient Active Problem List   Diagnosis   • Altered mental status   • Bilateral edema of lower extremity   • Cellulitis of lower extremity   • Acute on chronic renal failure (HCC)   • GERD (gastroesophageal reflux disease)   • Hyperkalemia   • Essential hypertension   • Acquired hypothyroidism   • Type 2 diabetes mellitus with nephropathy ([facility])   • Vitamin B12 deficiency   • Cellulitis of both lower extremities   • Anemia due to stage 4 chronic kidney disease ([facility])   • Acute renal failure (ARF) ([facility])   • Hyperkalemia   • Impaired functional mobility and activity tolerance   • Chronic diastolic congestive heart failure ([facility])   • Paroxysmal atrial fibrillation with rapid ventricular response ([facility])   • Pulmonary hypertension ([facility])   • Cor pulmonale, chronic ([facility])   • Chronic venous hypertension involving both sides   • Lymphedema of both lower extremities   • Obesity, morbid, BMI 50 or higher ([facility])   • A-fib ([facility])   • CKD (chronic kidney disease) stage 3, GFR 30-59 ml/min ([facility])   • Acute bronchitis due to other specified organisms   • Tachycardia-bradycardia syndrome ([facility])   • Pressure ulcer of right heel, unstageable ([facility])   • Mucopurulent chronic bronchitis ([facility])   • Acute pulmonary edema ([facility])   • Thrombocytopenia, unspecified ([facility])   • Chronic kidney disease, stage IV (severe) ([facility])   • Hypoglycemia   • Bradycardia     Past Medical History:   Diagnosis Date   • A-fib ([facility])     • Anemia 10/2/2018   • Diabetes mellitus (HCC)    • Disease of thyroid gland    • GERD (gastroesophageal reflux disease)    • Gout    • History of transfusion    • Hypertension      Past Surgical History:   Procedure Laterality Date   • EYE SURGERY     • INCISION AND DRAINAGE LEG Right 1/14/2019    Procedure: Right heel incision and drainage with graft application;  Surgeon: Ar Rueda DPM;  Location: [facility];  Service: Podiatry   • LEG SURGERY        General Information     Row Name 04/03/22 1011          Physical Therapy Time and Intention    Document Type evaluation  -TW     Mode of Treatment physical therapy  -TW     Row Name 04/03/22 1011          General Information    Patient Profile Reviewed yes  -TW     Prior Level of Function independent:;transfer  Upon d/c home pt was only transferring from bed to BSC only. Home health was not yet initiated. Pt was home alone.  -TW     Existing Precautions/Restrictions fall  -TW     Barriers to Rehab medically complex;previous functional deficit;ineffective coping;impaired sensation  -TW     Row Name 04/03/22 1011          Living Environment    People in Home alone  -TW     Row Name 04/03/22 1011          Home Main Entrance    Number of Stairs, Main Entrance none  -TW     Row Name 04/03/22 1011          Stairs Within Home, Primary    Number of Stairs, Within Home, Primary none  -TW     Row Name 04/03/22 1011          Cognition    Orientation Status (Cognition) oriented x 4  -TW     Row Name 04/03/22 1011          Safety Issues, Functional Mobility    Safety Issues Affecting Function (Mobility) friction/shear risk;insight into deficits/self-awareness;safety precaution awareness;safety precautions follow-through/compliance;sequencing abilities  -TW     Impairments Affecting Function (Mobility) balance;endurance/activity tolerance;strength;sensation/sensory awareness  -TW     Comment, Safety Issues/Impairments (Mobility) Pt is oriented x 4 but does not aknowledge  that her current physical condition limits her mobility and safety for being alone at home.  -           User Key  (r) = Recorded By, (t) = Taken By, (c) = Cosigned By    Initials Name Provider Type    Xochitl Barakat PT Physical Therapist               Mobility     Row Name 04/03/22 1011          Bed Mobility    Bed Mobility rolling right;rolling left;scooting/bridging  -TW     Rolling Left [place] (Bed Mobility) minimum assist (75% patient effort);verbal cues  -TW     Rolling Right [place] (Bed Mobility) minimum assist (75% patient effort);verbal cues  -TW     Scooting/Bridging [place] (Bed Mobility) maximum assist (25% patient effort);2 person assist  -TW     Assistive Device (Bed Mobility) bed rails;draw sheet  -TW     Comment, (Bed Mobility) Pt did assist with rolling with UE's by pulling on bed rail. Pt's LE assist was limited. Pt has bandages on B lower legs/calves with weeping noted. Pt declined to sit on EOB post having bath and bed change due to fatigue.  -     Row Name 04/03/22 1011          Bed-Chair Transfer    Bed-Chair [place] (Transfers) unable to assess  -     Row Name 04/03/22 1011          Sit-Stand Transfer    Sit-Stand [place] (Transfers) unable to assess  -     Row Name 04/03/22 1011          Gait/Stairs (Locomotion)    [place] Level (Gait) unable to assess  -           User Key  (r) = Recorded By, (t) = Taken By, (c) = Cosigned By    Initials Name Provider Type    Xochitl Barakat PT Physical Therapist               Obj/Interventions     Row Name 04/03/22 1011          Range of Motion Comprehensive    Comment, General Range of Motion B knee flexion is limited due to edema.  -     Row Name 04/03/22 1011          Strength Comprehensive (MMT)    Comment, General Manual Muscle Testing (MMT) Assessment BLE strength grossly 3-/4 to 3+/5  -     Row Name 04/03/22 1011          Motor Skills    Therapeutic Exercise hip;knee;ankle  Therapist did begin LE  ther ex with pt including glut sets, hip add sets, SAQs, and assisted heelslides and hip abd all 1 x 10. Ankle pumps encouraged and pt able to demonstrate  -TW           User Key  (r) = Recorded By, (t) = Taken By, (c) = Cosigned By    Initials Name Provider Type    TW Suzette, Xochitl, PT Physical Therapist               Goals/Plan     Row Name 04/03/22 1011          Bed Mobility Goal 1 (PT)    Activity/Assistive Device (Bed Mobility Goal 1, PT) bed mobility activities, all  -TW     [place] Level/Cues Needed (Bed Mobility Goal 1, PT) standby assist  -TW     Time Frame (Bed Mobility Goal 1, PT) 2 weeks  -TW     Progress/Outcomes (Bed Mobility Goal 1, PT) goal ongoing  -TW     Row Name 04/03/22 1011          Transfer Goal 1 (PT)    Activity/Assistive Device (Transfer Goal 1, PT) sit-to-stand/stand-to-sit;bed-to-chair/chair-to-bed;toilet;walker, rolling  -TW     [place] Level/Cues Needed (Transfer Goal 1, PT) contact guard required;verbal cues required  -TW     Time Frame (Transfer Goal 1, PT) 2 weeks  -TW     Progress/Outcome (Transfer Goal 1, PT) goal ongoing  -TW     Row Name 04/03/22 1011          Gait Training Goal 1 (PT)    Activity/Assistive Device (Gait Training Goal 1, PT) gait (walking locomotion);assistive device use;decrease fall risk;increase endurance/gait distance;walker, rolling  -TW     [place] Level (Gait Training Goal 1, PT) minimum assist (75% or more patient effort)  -TW     Distance (Gait Training Goal 1, PT) 20 ft  -TW     Time Frame (Gait Training Goal 1, PT) 2 weeks  -TW     Progress/Outcome (Gait Training Goal 1, PT) goal ongoing  -TW     Row Name 04/03/22 1011          Problem Specific Goal 1 (PT)    Problem Specific Goal 1 (PT) Pt to sit on EOB x 5 min with supervision only  -TW     Time Frame (Problem Specific Goal 1, PT) short-term goal (STG);5 days  -TW     Progress/Outcome (Problem Specific Goal 1, PT) goal ongoing  -TW     Row Name 04/03/22 1011          Patient Education  Goal (PT)    Activity (Patient Education Goal, PT) LE ther ex 1 x 15  -TW     [place]/Cues/Accuracy (Memory Goal 2, PT) demonstrates adequately  -TW     Time Frame (Patient Education Goal, PT) 2 weeks  -TW     Progress/Outcome (Patient Education Goal, PT) goal ongoing  -TW           User Key  (r) = Recorded By, (t) = Taken By, (c) = Cosigned By    Initials Name Provider Type    TW Xochitl Bradford, PT Physical Therapist               Clinical Impression     Row Name 04/03/22 1011          Pain    Pretreatment Pain Rating 0/10 - no pain  -TW     Posttreatment Pain Rating 0/10 - no pain  -TW     Row Name 04/03/22 1011          Plan of Care Review    Plan of Care Reviewed With patient;other (see comments)  pt's niece  -TW     Outcome Evaluation PT evaluation completed this date with pt alert and oriented x 4 as well as cooperative with assessment. Pt's niece present at beginning of the evaluation and post discussin with her neice, pt is now willing to have dialysis if needed as well as work with therapy with the goal of getting back to her home. Pt performed bed mobility with min assist to roll and max asssit + 2 PA for boosting up in bed. Pt did not think she she was able yet to sit on EOB so LE ther ex began as per flowsheet. Pt presents with deficits in strength and endurance as well balance. Pt is expected to improve her functional mobility with continued PT services prior to d/c.  -TW     Row Name 04/03/22 1011          Therapy Assessment/Plan (PT)    Patient/Family Therapy Goals Statement (PT) to return to her home when able.  -TW     Rehab Potential (PT) fair, will monitor progress closely  -TW     Criteria for Skilled Interventions Met (PT) yes;meets criteria  -TW     Predicted Duration of Therapy Intervention (PT) 2 wks  -TW     Row Name 04/03/22 1011          Vital Signs    O2 Delivery Pre Treatment room air  -TW     Pre Patient Position Supine  -TW     Intra Patient Position Side Lying  -TW     Post  Patient Position Supine  -TW     Row Name 04/03/22 1011          Positioning and Restraints    Pre-Treatment Position in bed  -TW     Post Treatment Position bed  -TW     In Bed notified nsg;supine;call light within reach;encouraged to call for assist;exit alarm on;heels elevated  -TW           User Key  (r) = Recorded By, (t) = Taken By, (c) = Cosigned By    Initials Name Provider Type    Xochitl Barakat PT Physical Therapist               Outcome Measures     Row Name 04/03/22 1011          How much help from another person do you currently need...    Turning from your back to your side while in flat bed without using bedrails? 3  -TW     Moving from lying on back to sitting on the side of a flat bed without bedrails? 2  -TW     Moving to and from a bed to a chair (including a wheelchair)? 1  -TW     Standing up from a chair using your arms (e.g., wheelchair, bedside chair)? 1  -TW     Climbing 3-5 steps with a railing? 1  -TW     To walk in hospital room? 1  -TW     AM-PAC 6 Clicks Score (PT) 9  -TW     Row Name 04/03/22 1011          Functional Assessment    Outcome Measure Options AM-PAC 6 Clicks Basic Mobility (PT)  -TW           User Key  (r) = Recorded By, (t) = Taken By, (c) = Cosigned By    Initials Name Provider Type    Xochitl Barakat PT Physical Therapist                             Physical Therapy Education                 Title: PT OT SLP Therapies (In Progress)     Topic: Physical Therapy (In Progress)     Point: Mobility training (Done)     Learning Progress Summary           Patient Acceptance, E,D, DU,NR by TW at 4/3/2022 1011    Comment: Pt education for improving bed mobility technique as well as LE ther ex technique.                   Point: Home exercise program (Done)     Learning Progress Summary           Patient Acceptance, E,D, DU,NR by  at 4/3/2022 1011    Comment: Pt education for improving bed mobility technique as well as LE ther ex technique.                   Point: Body  mechanics (Not Started)     Learner Progress:  Not documented in this visit.          Point: Precautions (Not Started)     Learner Progress:  Not documented in this visit.                      User Key     Initials Effective Dates Name Provider Type Discipline     06/16/21 -  Xochitl Bradford PT Physical Therapist PT              PT Recommendation and Plan  Planned Therapy Interventions (PT): balance training, bed mobility training, gait training, patient/family education, transfer training, strengthening  Plan of Care Reviewed With: patient, other (see comments) (pt's niece)  Outcome Evaluation: PT evaluation completed this date with pt alert and oriented x 4 as well as cooperative with assessment. Pt's niece present at beginning of the evaluation and post discussin with her neice, pt is now willing to have dialysis if needed as well as work with therapy with the goal of getting back to her home. Pt performed bed mobility with min assist to roll and max asssit + 2 PA for boosting up in bed. Pt did not think she she was able yet to sit on EOB so LE ther ex began as per flowsheet. Pt presents with deficits in strength and endurance as well balance. Pt is expected to improve her functional mobility with continued PT services prior to d/c.     Time Calculation:    PT Charges     Row Name 04/03/22 1011             Time Calculation    Stop Time 1011  -TW      PT Received On 04/03/22  -TW      PT Goal Re-Cert Due Date 04/13/22  -TW            User Key  (r) = Recorded By, (t) = Taken By, (c) = Cosigned By    Initials Name Provider Type    TW Xochitl Bradford PT Physical Therapist              Therapy Charges for Today     Code Description Service Date Service Provider Modifiers Qty    [id number]  PT EVAL MOD COMPLEXITY 3 4/3/2022 Xochitl Bradford PT GP 1          PT G-Codes  Outcome Measure Options: AM-PAC 6 Clicks Basic Mobility (PT)  AM-PAC 6 Clicks Score (PT): 9    Xochitl Bradford PT  4/3/2022      Electronically signed by  Xochitl Bradford, PT at 04/03/22 2847

## 2022-04-04 NOTE — PLAN OF CARE
Goal Outcome Evaluation:  Plan of Care Reviewed With: patient        Progress: no change  Outcome Evaluation: VSS, wound care as ordered, maintaining o2 sats >90% on RA

## 2022-04-04 NOTE — PROGRESS NOTES
"    [facility]    Name:  Millicent Mckeon   Age:  61 y.o.  Sex:  female  :  1960  MRN:  [id number]   Visit Number:  [id number]  Admission Date:  3/30/2022  Date Of Service:  22  Primary Care Physician:  Marianela Albright APRN     LOS: 3 days :    Chief Complaint:      Generalized weakness.    Subjective:    Ms. Mckeon was seen and examined this morning.  She is a bit drowsy today.  She states that she did not sleep well because \"I kept getting woken up\".  She denies any chest pain or shortness of breath.  She had about 1750 mL of urine output since yesterday.  Her creatinine is going back up again after 2 days of vigorous diuresis.  Patient was evaluated by Dr. Cutler this morning and the patient is partially open to possibility of peritoneal dialysis at home, however, the logistics of doing that at home with no help may be a real challenge.    Hospital Course:    Ms. Mckeon is a 61-year-old chronically ill lady with history of atrial fibrillation, chronic systolic heart failure, chronic kidney disease stage IV, chronic cor pulmonale, morbid obesity, functional quadriplegia who is wheelchair-bound was brought to the emergency room by EMS with symptoms of generalized weakness.  Apparently she was found at home disheveled, covered in various stages of skin sores and wounds.  She was discharged from this facility on 3/7/2022 after being treated for severe hypothermia likely environmental associated with hypoglycemia and bilateral lower extremity cellulitis.  Patient apparently lives alone after her sister passed away in 2021.       In the emergency room, she was afebrile with a pulse of 47 and blood pressure of 121/82 and saturating at 94% on room air.  Blood work done in the emergency room revealed a glucose of 40 with a creatinine of 3.74, BUN of 75, alkaline phosphate of 261 and a hemoglobin of 10.8.  Her proBNP level was 17,302 but troponin was negative. "  Chest x-ray showed cardiomegaly with bilateral homogeneous opacity in the lower zones.  Patient was given IV dextrose 1 ampoule in the emergency room.  She was also thought to have bilateral pneumonia and was placed on Rocephin and azithromycin and was admitted to the medical floor with telemetry.  Unfortunately after admission she developed low normal blood pressures and hypothermia.  She was also drowsy.  She was seen by Dr. Cutler and he started her on midodrine and IV Lasix.  A Wilkinson catheter was placed.  Due to suspicion of sepsis, and recent hospitalization for antibody coverage was escalated to Zosyn and vancomycin.  Due to poor IV access, Dr. Guadalupe placed a left internal jugular central venous line on 3/31/2022.  With broadening of the IV antibiotics therapy, midodrine, her blood pressure improved.  She was continued on IV diuretic therapy.  Once her nasal swab for MRSA came back negative, her vancomycin was discontinued.  Patient improved slowly with regards to her urine output.  She was seen by behavioral health services and she declined any help and denied depression.  She was seen by palliative care services.  Patient does not want dialysis and wants to be DNR.  She wants to go home and be comfortable when discharged from the hospital.  She has refused to go to short-term rehabilitation on multiple occasions.    Patient was continued on diuretic therapy with good response and good diuresis.  Her creatinine as well as the bilateral lower extremity cellulitis improved.  However, with continued diuresis, her creatinine started going up again.    Review of Systems:     All systems were reviewed and negative except as mentioned in subjective, assessment and plan.    Vital Signs:    Temp:  [94.4 °F (34.7 °C)-98.3 °F (36.8 °C)] 94.4 °F (34.7 °C)  Heart Rate:  [72-98] 81  Resp:  [16-22] 22  BP: (124-139)/(68-90) 133/77    Intake and output:    I/O last 3 completed shifts:  In: 2087 [P.O.:1680; I.V.:407]  Out:  3350 [Urine:3350]  I/O this shift:  In: 360 [P.O.:360]  Out: -     Physical Examination:    General Appearance:  Alert and cooperative. Not in any distress.   Head:  Atraumatic and normocephalic.   Eyes: Conjunctivae and sclerae normal, no icterus. No pallor.   Throat: No oral lesions, no thrush, oral mucosa moist.   Neck: Supple, trachea midline, no thyromegaly.  Left internal jugular central venous line noted.   Lungs:   Breath sounds decreased bilaterally in the bases.  No wheezing.  Bilateral scattered crackles heard. No Pleural rub or bronchial breathing.   Heart:  Normal S1 and S2, no murmur, no gallop, no rub. No JVD.   Abdomen:   Normal bowel sounds, no masses, no organomegaly. Soft, nontender, obese with a large pannus, no rebound tenderness.  Wilkinson catheter is in place.   Extremities: Supple, peripheral cyanosis in the toes, no clubbing.  3+ pitting edema noted all the way up to her thighs with increased erythema in the lower extremities associated with multiple skin ulcerations and scabs-this seems to have significantly improved since admission.   Skin: No bleeding.  Multiple ulcers noted in the lower extremity with chronic erythema and venous stasis changes.   Neurologic: Alert and oriented x 3. No facial asymmetry. Moves all four limbs but does have generalized weakness. No tremors.      Laboratory results:    Results from last 7 days   Lab Units 04/04/22  0955 04/03/22  0604 04/02/22  0453 03/31/22  0541 03/30/22  1534   SODIUM mmol/L 137 141 141   < > 142   POTASSIUM mmol/L 4.1 4.5 4.6   < > 4.5   CHLORIDE mmol/L 95* 97* 98   < > 100   CO2 mmol/L 23.2 21.2* 18.1*   < > 24.2   BUN mg/dL 97* 101* 94*   < > 75*   CREATININE mg/dL 5.03* 4.69* 4.74*   < > 3.74*   CALCIUM mg/dL 7.6* 7.9* 8.1*   < > 8.0*   BILIRUBIN mg/dL 0.6 0.7  --   --  0.7   ALK PHOS U/L 225* 250*  --   --  261*   ALT (SGPT) U/L 81* 73*  --   --  17   AST (SGOT) U/L 61* 77*  --   --  22   GLUCOSE mg/dL 428* 251* 132*   < > 40*    < > =  values in this interval not displayed.     Results from last 7 days   Lab Units 04/04/22  0955 04/03/22  0604 04/02/22  0453   WBC 10*3/mm3 6.04 6.00 6.23   HEMOGLOBIN g/dL 10.0* 9.7* 10.2*   HEMATOCRIT % 32.2* 31.8* 34.1   PLATELETS 10*3/mm3 69* 77* 88*     Results from last 7 days   Lab Units 04/03/22  0604 04/02/22  0453 03/31/22  1343   INR  1.84* 3.30* 2.54*     Results from last 7 days   Lab Units 03/30/22  1534   TROPONIN T ng/mL 0.025     I have reviewed the patient's laboratory results.    Radiology results:    No radiology results from the last 24 hrs    Medication Review:     I have reviewed the patient's active and prn medications.     Problem List:      Hypoglycemia    Bilateral edema of lower extremity    Essential hypertension    Acquired hypothyroidism    Type 2 diabetes mellitus with nephropathy (HCC)    Cellulitis of both lower extremities    Anemia due to stage 4 chronic kidney disease ([facility])    Chronic diastolic congestive heart failure (HCC)    Cor pulmonale, chronic (HCC)    Chronic kidney disease, stage IV (severe) ([facility])    Bradycardia    Assessment:    Sepsis with generalized weakness, hypothermia and hypotension secondary to #2,POA, resolved.  Bilateral lower extremity cellulitis, POA, improving.  Acute hypoglycemia, POA, resolved.  Severe bradycardia possibly related to medications in the setting of worsening renal failure, resolved.  Progressive worsening of chronic kidney disease stage IV with volume overload, POA, improving.  Chronic venous stasis of the lower extremities with multiple skin ulcers, POA..  Chronic systolic heart failure with ejection fraction of 35%.  Paroxysmal atrial fibrillation on apixaban.  Chronic cor pulmonale.  Chronic hypoxic respiratory failure on home oxygen.  Diabetes mellitus type 2 with nephropathy.  Morbid obesity with a BMI of 57.  Anemia of chronic kidney disease.  Functional quadriplegia.  Acquired hypothyroidism.  Sacral deep tissue injury present on  admission.    Plan:    Sepsis/hypothermia/hypotension.  -This is likely secondary to bilateral lower extremity cellulitis that was present on admission and has improved.  -Continue IV antibiotics therapy with Zosyn (day 4).  -This may be switched over to Augmentin from tomorrow for another 5 days.  -Nasal swab for MRSA was negative. Blood cultures have been negative so far.  -Wound care services have seen the patient for bilateral lower extremity cellulitis/ulcerations and with the local care and IV antibiotics therapy her cellulitis is improving.    Generalized weakness/hypoglycemia/bradycardia.    -Unfortunately, patient has multiple comorbidities and has progressive decline in her health in the last several months.  Her current symptomatology is likely due to a combination of hypoglycemia, bradycardia, worsening renal failure and congestive heart failure.  -Bradycardia has improved and her Toprol-XL has been restarted.  -We will continue to hold amiodarone.    Progressive worsening of chronic kidney disease stage IV.  -She did respond to IV diuretic therapy initially but her creatinine has started to bump up again.  -I have discussed the patient's condition and treatment plan with Dr. Leone.  -Continue Bumex and spironolactone as per nephrology today.    Diabetes mellitus type 2.  -Patient initially presented with hypoglycemia but now has hyperglycemia.  -I will place her on Levemir 20 units twice daily and aggressive subcutaneous insulin protocol for coverage.  -Hemoglobin A1c was 8.5 on 3/4/2022.    Patient is being followed by palliative care services.    Discussed with nursing staff and multidisciplinary team.    I discussed the patient's condition and treatment plan with her niece Val over the phone.  She is planning to come and talk to the patient in the next couple of hours and is hoping to convince her to agree to peritoneal dialysis and go home.  She also tells me that multiple family members will  be helping the patient going forward.    DVT Prophylaxis: Apixaban  Code Status: DNR/DNI  Diet: Diabetic renal  Discharge Plan: Pending-possible home with home health versus home with home hospice.  Patient does not want to go to nursing home or rehab facility.      Samson Reyes MD  04/04/22  12:19 EDT    Dictated utilizing Dragon dictation.

## 2022-04-04 NOTE — THERAPY EVALUATION
"Pt declined OT evaluation today stating she was too tired.  Pt reports \"they keep waking me up all the time\".  Will attempt again tomorrow.  "

## 2022-04-05 ENCOUNTER — ANESTHESIA (OUTPATIENT)
Dept: PERIOP | Facility: HOSPITAL | Age: 62
End: 2022-04-05

## 2022-04-05 ENCOUNTER — APPOINTMENT (OUTPATIENT)
Dept: GENERAL RADIOLOGY | Facility: HOSPITAL | Age: 62
End: 2022-04-05

## 2022-04-05 ENCOUNTER — ANESTHESIA EVENT (OUTPATIENT)
Dept: PERIOP | Facility: HOSPITAL | Age: 62
End: 2022-04-05

## 2022-04-05 LAB
ALBUMIN SERPL-MCNC: 3.5 G/DL (ref 3.5–5.2)
ANION GAP SERPL CALCULATED.3IONS-SCNC: 19.5 MMOL/L (ref 5–15)
BUN SERPL-MCNC: 104 MG/DL (ref 8–23)
BUN/CREAT SERPL: 20.8 (ref 7–25)
CALCIUM SPEC-SCNC: 7.9 MG/DL (ref 8.6–10.5)
CHLORIDE SERPL-SCNC: 98 MMOL/L (ref 98–107)
CO2 SERPL-SCNC: 22.5 MMOL/L (ref 22–29)
CREAT SERPL-MCNC: 5.01 MG/DL (ref 0.57–1)
DEPRECATED RDW RBC AUTO: 55.2 FL (ref 37–54)
EGFRCR SERPLBLD CKD-EPI 2021: 9.2 ML/MIN/1.73
ERYTHROCYTE [DISTWIDTH] IN BLOOD BY AUTOMATED COUNT: 21.9 % (ref 12.3–15.4)
GLUCOSE BLDC GLUCOMTR-MCNC: 136 MG/DL (ref 70–130)
GLUCOSE BLDC GLUCOMTR-MCNC: 197 MG/DL (ref 70–130)
GLUCOSE BLDC GLUCOMTR-MCNC: 83 MG/DL (ref 70–130)
GLUCOSE BLDC GLUCOMTR-MCNC: 94 MG/DL (ref 70–130)
GLUCOSE SERPL-MCNC: 191 MG/DL (ref 65–99)
HCT VFR BLD AUTO: 32 % (ref 34–46.6)
HGB BLD-MCNC: 9.9 G/DL (ref 12–15.9)
MCH RBC QN AUTO: 23.5 PG (ref 26.6–33)
MCHC RBC AUTO-ENTMCNC: 30.9 G/DL (ref 31.5–35.7)
MCV RBC AUTO: 76 FL (ref 79–97)
PHOSPHATE SERPL-MCNC: 5.3 MG/DL (ref 2.5–4.5)
PLATELET # BLD AUTO: 60 10*3/MM3 (ref 140–450)
PMV BLD AUTO: ABNORMAL FL
POTASSIUM SERPL-SCNC: 3.7 MMOL/L (ref 3.5–5.2)
RBC # BLD AUTO: 4.21 10*6/MM3 (ref 3.77–5.28)
SODIUM SERPL-SCNC: 140 MMOL/L (ref 136–145)
WBC NRBC COR # BLD: 5.14 10*3/MM3 (ref 3.4–10.8)

## 2022-04-05 PROCEDURE — 82962 GLUCOSE BLOOD TEST: CPT

## 2022-04-05 PROCEDURE — 0JH60XZ INSERTION OF TUNNELED VASCULAR ACCESS DEVICE INTO CHEST SUBCUTANEOUS TISSUE AND FASCIA, OPEN APPROACH: ICD-10-PCS | Performed by: SURGERY

## 2022-04-05 PROCEDURE — 63710000001 INSULIN ASPART PER 5 UNITS: Performed by: INTERNAL MEDICINE

## 2022-04-05 PROCEDURE — 85027 COMPLETE CBC AUTOMATED: CPT | Performed by: INTERNAL MEDICINE

## 2022-04-05 PROCEDURE — C1750 CATH, HEMODIALYSIS,LONG-TERM: HCPCS | Performed by: SURGERY

## 2022-04-05 PROCEDURE — 25010000002 CEFAZOLIN PER 500 MG

## 2022-04-05 PROCEDURE — B518ZZA FLUOROSCOPY OF SUPERIOR VENA CAVA, GUIDANCE: ICD-10-PCS | Performed by: SURGERY

## 2022-04-05 PROCEDURE — 99222 1ST HOSP IP/OBS MODERATE 55: CPT | Performed by: SURGERY

## 2022-04-05 PROCEDURE — C1894 INTRO/SHEATH, NON-LASER: HCPCS | Performed by: SURGERY

## 2022-04-05 PROCEDURE — 25010000002 HEPARIN (PORCINE) PER 1000 UNITS: Performed by: SURGERY

## 2022-04-05 PROCEDURE — 77001 FLUOROGUIDE FOR VEIN DEVICE: CPT | Performed by: SURGERY

## 2022-04-05 PROCEDURE — 80069 RENAL FUNCTION PANEL: CPT | Performed by: INTERNAL MEDICINE

## 2022-04-05 PROCEDURE — 36558 INSERT TUNNELED CV CATH: CPT | Performed by: SURGERY

## 2022-04-05 PROCEDURE — 02HV33Z INSERTION OF INFUSION DEVICE INTO SUPERIOR VENA CAVA, PERCUTANEOUS APPROACH: ICD-10-PCS | Performed by: SURGERY

## 2022-04-05 PROCEDURE — 5A1D70Z PERFORMANCE OF URINARY FILTRATION, INTERMITTENT, LESS THAN 6 HOURS PER DAY: ICD-10-PCS | Performed by: INTERNAL MEDICINE

## 2022-04-05 PROCEDURE — 25010000002 PIPERACILLIN SOD-TAZOBACTAM PER 1 G

## 2022-04-05 PROCEDURE — 99233 SBSQ HOSP IP/OBS HIGH 50: CPT | Performed by: FAMILY MEDICINE

## 2022-04-05 PROCEDURE — 71045 X-RAY EXAM CHEST 1 VIEW: CPT

## 2022-04-05 PROCEDURE — B548ZZA ULTRASONOGRAPHY OF SUPERIOR VENA CAVA, GUIDANCE: ICD-10-PCS | Performed by: SURGERY

## 2022-04-05 PROCEDURE — 76000 FLUOROSCOPY <1 HR PHYS/QHP: CPT | Performed by: SURGERY

## 2022-04-05 PROCEDURE — 63710000001 INSULIN DETEMIR PER 5 UNITS: Performed by: SURGERY

## 2022-04-05 RX ORDER — KETAMINE HCL IN NACL, ISO-OSM 100MG/10ML
SYRINGE (ML) INJECTION AS NEEDED
Status: DISCONTINUED | OUTPATIENT
Start: 2022-04-05 | End: 2022-04-05 | Stop reason: SURG

## 2022-04-05 RX ORDER — HEPARIN SODIUM 1000 [USP'U]/ML
INJECTION, SOLUTION INTRAVENOUS; SUBCUTANEOUS AS NEEDED
Status: DISCONTINUED | OUTPATIENT
Start: 2022-04-05 | End: 2022-04-05 | Stop reason: HOSPADM

## 2022-04-05 RX ORDER — SODIUM CHLORIDE 0.9 % (FLUSH) 0.9 %
10 SYRINGE (ML) INJECTION AS NEEDED
Status: DISCONTINUED | OUTPATIENT
Start: 2022-04-05 | End: 2022-04-05 | Stop reason: HOSPADM

## 2022-04-05 RX ORDER — BUPIVACAINE HYDROCHLORIDE 5 MG/ML
INJECTION, SOLUTION EPIDURAL; INTRACAUDAL AS NEEDED
Status: DISCONTINUED | OUTPATIENT
Start: 2022-04-05 | End: 2022-04-05 | Stop reason: HOSPADM

## 2022-04-05 RX ORDER — LIDOCAINE HYDROCHLORIDE 20 MG/ML
INJECTION, SOLUTION INFILTRATION; PERINEURAL AS NEEDED
Status: DISCONTINUED | OUTPATIENT
Start: 2022-04-05 | End: 2022-04-05 | Stop reason: HOSPADM

## 2022-04-05 RX ORDER — SODIUM CHLORIDE 9 MG/ML
500 INJECTION, SOLUTION INTRAVENOUS CONTINUOUS
Status: DISCONTINUED | OUTPATIENT
Start: 2022-04-05 | End: 2022-04-06

## 2022-04-05 RX ORDER — HEPARIN SODIUM 1000 [USP'U]/ML
1000 INJECTION, SOLUTION INTRAVENOUS; SUBCUTANEOUS AS NEEDED
Status: DISCONTINUED | OUTPATIENT
Start: 2022-04-05 | End: 2022-04-14 | Stop reason: HOSPADM

## 2022-04-05 RX ORDER — CEFAZOLIN SODIUM IN 0.9 % NACL 3 G/100 ML
3 INTRAVENOUS SOLUTION, PIGGYBACK (ML) INTRAVENOUS ONCE
Status: COMPLETED | OUTPATIENT
Start: 2022-04-05 | End: 2022-04-05

## 2022-04-05 RX ORDER — ALBUMIN (HUMAN) 12.5 G/50ML
12.5 SOLUTION INTRAVENOUS AS NEEDED
Status: ACTIVE | OUTPATIENT
Start: 2022-04-05 | End: 2022-04-06

## 2022-04-05 RX ADMIN — KETOCONAZOLE 1 APPLICATION: 20 CREAM TOPICAL at 20:42

## 2022-04-05 RX ADMIN — INSULIN DETEMIR 20 UNITS: 100 INJECTION, SOLUTION SUBCUTANEOUS at 09:00

## 2022-04-05 RX ADMIN — SILVER SULFADIAZINE 1 APPLICATION: 10 CREAM TOPICAL at 09:00

## 2022-04-05 RX ADMIN — Medication 1 CAPSULE: at 20:40

## 2022-04-05 RX ADMIN — MUPIROCIN 1 APPLICATION: 2 CREAM TOPICAL at 20:42

## 2022-04-05 RX ADMIN — Medication 10 ML: at 09:00

## 2022-04-05 RX ADMIN — Medication 3 ML: at 09:00

## 2022-04-05 RX ADMIN — LIDOCAINE 1 PATCH: 50 PATCH CUTANEOUS at 20:40

## 2022-04-05 RX ADMIN — MUPIROCIN 1 APPLICATION: 2 CREAM TOPICAL at 09:00

## 2022-04-05 RX ADMIN — Medication 10 MG: at 14:15

## 2022-04-05 RX ADMIN — SILVER SULFADIAZINE 1 APPLICATION: 10 CREAM TOPICAL at 02:44

## 2022-04-05 RX ADMIN — Medication 10 ML: at 20:41

## 2022-04-05 RX ADMIN — HYDRALAZINE HYDROCHLORIDE 25 MG: 25 TABLET ORAL at 22:27

## 2022-04-05 RX ADMIN — HYDRALAZINE HYDROCHLORIDE 25 MG: 25 TABLET ORAL at 20:40

## 2022-04-05 RX ADMIN — Medication 3 ML: at 20:42

## 2022-04-05 RX ADMIN — Medication 3 G: at 14:14

## 2022-04-05 RX ADMIN — Medication 10 MG: at 14:24

## 2022-04-05 RX ADMIN — AMOXICILLIN AND CLAVULANATE POTASSIUM 500 MG: 500; 125 TABLET, FILM COATED ORAL at 20:40

## 2022-04-05 RX ADMIN — SODIUM CHLORIDE 500 ML: 9 INJECTION, SOLUTION INTRAVENOUS at 13:58

## 2022-04-05 RX ADMIN — TAZOBACTAM SODIUM AND PIPERACILLIN SODIUM 4.5 G: 500; 4 INJECTION, SOLUTION INTRAVENOUS at 03:14

## 2022-04-05 RX ADMIN — KETOCONAZOLE 1 APPLICATION: 20 CREAM TOPICAL at 09:00

## 2022-04-05 RX ADMIN — GUAIFENESIN 600 MG: 600 TABLET, EXTENDED RELEASE ORAL at 20:40

## 2022-04-05 RX ADMIN — INSULIN ASPART 3 UNITS: 100 INJECTION, SOLUTION INTRAVENOUS; SUBCUTANEOUS at 06:34

## 2022-04-05 RX ADMIN — HYDRALAZINE HYDROCHLORIDE 25 MG: 25 TABLET ORAL at 06:34

## 2022-04-05 RX ADMIN — Medication 10 ML: at 20:42

## 2022-04-05 RX ADMIN — DOCUSATE SODIUM 100 MG: 100 CAPSULE, LIQUID FILLED ORAL at 20:40

## 2022-04-05 RX ADMIN — SILVER SULFADIAZINE 1 APPLICATION: 10 CREAM TOPICAL at 20:42

## 2022-04-05 NOTE — OP NOTE
PATIENT:    Millicent Mckeon    DATE OF SURGERY:  4/5/2022    PHYSICIAN:    Jean Carlos Guadalupe MD    REFERRING PHYSICIAN:  Marianela Albright APRN     YOB: 1960    PREOPERATIVE DIAGNOSIS:  End-stage renal failure    POSTOPERATIVE DIAGNOSIS:  End-stage renal failure    PROCEDURE:  Tunneled dialysis catheter placement with fluoroscopy    EBL: Less than 30 mL    Specimen: None     Complications: None    ANESTHESIA:  MAC    OPERATIVE PROCEDURE:  The patient was taken to the operating room, placed in the supine position.  Anesthesia had been delivered.  The neck and chest were prepped and draped in the usual fashion.  The right internal jugular vein was accessed using a large-bore needle using ultrasound guidance.  Through this guidewire is placed and the rest of this procedure was performed under fluoroscopic guidance.  Over the guidewire the introducer sheath was then advanced and appropriately positioned.  Through the sheath the catheter was then advanced and appropriately positioned again using fluoroscopic guidance.  Through a lateral stab wound the catheter was then tunneled and the Y connector was attached and both ports aspirated and flushed easily.  A final flush of the thousand units of heparin was then instilled into the catheter.  Initial incision was closed with interrupted simple nylon closure.  Dressings were applied.  There was no significant bleeding and hemostasis Well-controlled.  There were no intraoperative complications      The patient was stable at this point in time and subsequently transferred back to the recovery room in stable condition.       Jean Carlos Guadalupe MD  4/5/2022  14:57 EDT

## 2022-04-05 NOTE — PROGRESS NOTES
[facility]    Name:  Millicent Mckeon   Age:  61 y.o.  Sex:  female  :  1960  MRN:  [id number]   Visit Number:  [id number]  Admission Date:  3/30/2022  Date Of Service:  22  Primary Care Physician:  Marianela Albright APRN     LOS: 4 days :    Chief Complaint:      Follow-up weakness, renal failure    Subjective:    Patient seen at bedside.  Her niece was also at bedside.  They have apparently had multiple discussions with her about needing dialysis.  The patient was in agreement with having dialysis started, however she was adamant that she will not be on long-term hemodialysis, and will prefer to do peritoneal dialysis or no dialysis.  This was reiterated multiple times by her.  She had no other significant complaints at this time.  Her main goal is to go home soon.    Hospital Course:    The patient is a 61-year-old chronically ill-appearing female with history of atrial fibrillation, chronic systolic congestive heart failure, CKD stage IV, chronic cor pulmonale, morbid obesity, functional quadriplegia who had presented to the emergency room with generalized weakness.  She had apparently been found at home disheveled and multiple skin sores and wounds.  She had had multiple treatments lately for cellulitis of the lower extremity and hypoglycemia.  Apparently lives at home per family report.    Initial work-up was demonstrating bradycardia, hypoglycemia, with creatinine 3.74 and a BUN of 75.  Hemoglobin 10.8.  Chest x-ray show cardiomegaly and bilateral opacity in the lower zones.  Concern for pneumonia and she was given azithromycin and Rocephin.  She did develop some low blood pressures and became hypothermic upon admission.  She was started on midodrine and IV Lasix and had Wilkinson catheter placed.  There was concern for sepsis due to her recent hospitalization she was started on Zosyn and vancomycin.  She had a left internal jugular central venous line  placed on 3/31/2022.  She subsequent had improvement in her blood pressures.  Her MRSA swab was negative, vancomycin was discontinued.  She also had some improvement in her urine output.  She was noted to have overall worsening kidney function, discussions had about dialysis.  Patient was seen by palliative care services.  She did elect to be a DNR/DNI.  After discussion with patient's niece, patient is now in agreement with trial of dialysis and will plan on having a tunneled dialysis catheter placed today.  Long-term would be for peritoneal dialysis catheter.    Review of Systems:     All systems were reviewed and negative except as mentioned in subjective, assessment and plan.    Vital Signs:    Temp:  [96.2 °F (35.7 °C)-98 °F (36.7 °C)] 98 °F (36.7 °C)  Heart Rate:  [82-85] 83  Resp:  [16-20] 18  BP: (125-146)/(67-87) 139/74    Intake and output:    I/O last 3 completed shifts:  In: 1547 [P.O.:1140; I.V.:407]  Out: 2150 [Urine:2150]  No intake/output data recorded.    Physical Examination:    General Appearance:  Alert and cooperative.  No acute distress.  Chronically ill-appearing   Head:  Atraumatic and normocephalic.   Eyes: Conjunctivae and sclerae normal, no icterus. No pallor.   Throat: No oral lesions, no thrush, oral mucosa moist.   Neck: Supple, trachea midline, no thyromegaly.   Lungs:    Breath sounds diminished.   Heart:  Normal S1 and S2, no murmur, no gallop, no rub. No JVD.   Abdomen:   Normal bowel sounds, no masses, no organomegaly. Soft, nontender, nondistended, no rebound tenderness.  Obese, Wilkinson catheter noted   Extremities: Supple, 3+ lower edema, no cyanosis, no clubbing.  Venous insufficiency noted.   Skin:  Patient has multiple areas of erythema of the lower extremities, scabs, ulcerations of the skin.   Neurologic: Alert and oriented x 3. No facial asymmetry. Moves all four limbs. No tremors.      Laboratory results:    Results from last 7 days   Lab Units 04/05/22  0537 04/04/22  0955  04/03/22  0604 03/31/22  0541 03/30/22  1534   SODIUM mmol/L 140 137 141   < > 142   POTASSIUM mmol/L 3.7 4.1 4.5   < > 4.5   CHLORIDE mmol/L 98 95* 97*   < > 100   CO2 mmol/L 22.5 23.2 21.2*   < > 24.2   BUN mg/dL 104* 97* 101*   < > 75*   CREATININE mg/dL 5.01* 5.03* 4.69*   < > 3.74*   CALCIUM mg/dL 7.9* 7.6* 7.9*   < > 8.0*   BILIRUBIN mg/dL  --  0.6 0.7  --  0.7   ALK PHOS U/L  --  225* 250*  --  261*   ALT (SGPT) U/L  --  81* 73*  --  17   AST (SGOT) U/L  --  61* 77*  --  22   GLUCOSE mg/dL 191* 428* 251*   < > 40*    < > = values in this interval not displayed.     Results from last 7 days   Lab Units 04/05/22  0537 04/04/22  0955 04/03/22  0604   WBC 10*3/mm3 5.14 6.04 6.00   HEMOGLOBIN g/dL 9.9* 10.0* 9.7*   HEMATOCRIT % 32.0* 32.2* 31.8*   PLATELETS 10*3/mm3 60* 69* 77*     Results from last 7 days   Lab Units 04/03/22  0604 04/02/22  0453 03/31/22  1343   INR  1.84* 3.30* 2.54*     Results from last 7 days   Lab Units 03/30/22  1534   TROPONIN T ng/mL 0.025     Results from last 7 days   Lab Units 03/30/22  1716   BLOODCX  No growth at 5 days  No growth at 5 days         I have reviewed the patient's laboratory results.    Radiology results:    No radiology results from the last 24 hrs  I have reviewed the patient's radiology reports.    Medication Review:     I have reviewed the patient's active and prn medications.     Problem List:      Hypoglycemia    Bilateral edema of lower extremity    Essential hypertension    Acquired hypothyroidism    Type 2 diabetes mellitus with nephropathy (HCC)    Cellulitis of both lower extremities    Anemia due to stage 4 chronic kidney disease (HCC)    Chronic diastolic congestive heart failure (HCC)    Cor pulmonale, chronic (HCC)    Chronic kidney disease, stage IV (severe) (HCC)    Bradycardia      Assessment:    1. Sepsis with generalized weakness, hypothermia and hypotension secondary to #2,POA, resolved.  2. Bilateral lower extremity cellulitis, POA,  improving.  3. Acute hypoglycemia, POA, resolved.  4. Severe bradycardia possibly related to medications in the setting of worsening renal failure, resolved.  5. Progressive worsening of chronic kidney disease stage IV with volume overload, POA, improving.  6. Chronic venous stasis of the lower extremities with multiple skin ulcers, POA..  7. Chronic systolic heart failure with ejection fraction of 35%.  8. Paroxysmal atrial fibrillation on apixaban.  9. Chronic cor pulmonale.  10. Chronic hypoxic respiratory failure on home oxygen.  11. Diabetes mellitus type 2 with nephropathy.  12. Morbid obesity with a BMI of 57.  13. Anemia of chronic kidney disease.  14. Functional quadriplegia.  15. Acquired hypothyroidism.  16. Sacral deep tissue injury present on admission.      Plan:    Sepsis/hypothermia/hypotension.  Continue with day 4 of Zosyn for lower extremity cellulitis and possible panniculitis.  Will consider switching to oral antibiotics tomorrow.  Continue with local wound care.     Generalized weakness/hypoglycemia/bradycardia.    Likely multifactorial in setting of severe multiple comorbidities, functional quadriplegia, worsening renal and congestive heart failure noted.  Her Toprol-XL was restarted.  Her amiodarone is on hold.     Progressive worsening of chronic kidney disease stage IV.  Recommendation now from nephrology is to initiate hemodialysis.  Patient is agreeable with a trial of hemodialysis, with plans for peritoneal dialysis..     Diabetes mellitus type 2.  A1c of 8.5.  She continues on Levemir 20 units twice a day with aggressive SSI protocol.    We will see how patient does with initiation of hemodialysis.  Patient will ultimately want to have peritoneal dialysis access placed.  Patient is refusing to go to a nursing home or rehab facility.    Of note, will need to monitor hemoglobin and platelets.  She is currently on aspirin, Eliquis has been on hold.    DVT Prophylaxis: Eliquis is on  hold.  Code Status: DNR/DNI  Diet: Renal  Discharge Plan: TBD    Cheyanne Valencia DO  04/05/22  13:10 EDT    Dictated utilizing Dragon dictation.

## 2022-04-05 NOTE — ANESTHESIA POSTPROCEDURE EVALUATION
Patient: Millicent Mckeon    Procedure Summary     Date: 04/05/22 Room / Location: [facility] OR 1 /  [facility]    Anesthesia Start: 1412 Anesthesia Stop: 1558    Procedure: HEMODIALYSIS CATHETER INSERTION (N/A ) Diagnosis:     Surgeons: Jean Carlos Guadalupe MD Provider: Carlos Ahn CRNA    Anesthesia Type: MAC ASA Status: 4 - Emergent          Anesthesia Type: MAC    Vitals  Vitals Value Taken Time   /69 04/05/22 1542   Temp     Pulse 84 04/05/22 1542   Resp 13 04/05/22 1542   SpO2 96 % 04/05/22 1542           Post Anesthesia Care and Evaluation    Patient location during evaluation: bedside  Patient participation: complete - patient participated  Level of consciousness: awake and alert  Pain score: 0  Pain management: adequate  Airway patency: patent  Anesthetic complications: No anesthetic complications  PONV Status: none  Cardiovascular status: acceptable  Respiratory status: acceptable  Hydration status: acceptable

## 2022-04-05 NOTE — PLAN OF CARE
Goal Outcome Evaluation:  Plan of Care Reviewed With: patient        Progress: no change  Outcome Evaluation: Pt's temp 96.2 rectally at start of shift, improved with warm blankets, maintaining o2 sat >90% on RA, encouraged frequent turning, pt remains A &Ox3, aware of plans for possible dialysis cath placement and pt states she is agreeable with that plan at this time.

## 2022-04-05 NOTE — PROGRESS NOTES
[facility] Progress Note  [facility]. [place]        Patient Name: Millicent Mckeon  : 1960  MRN: [id number]   LOS: 4 days    Patient Care Team:  Marianela Albright APRN as PCP - General (Family Medicine)  Geremias Shepherd MD as Consulting Physician (General Surgery)  Lisa Cardoso MD as Surgeon (General Surgery)    Chief Complaint:    Chief Complaint   Patient presents with   • Weakness - Generalized     Unable to stand     Primary Care Physician:  Marianela Albright APRN  Date of admission: 3/30/2022    Subjective     Interval History:   Events noted from last 24 hours.  Patient is drowsy and sleepy this morning,   she denies having any chest pain or shortness of breath.  No fever.  Her POA Val is in the room and after having detailed discussion with her yesterday patient has decided to go ahead and get a tunneled catheter as well as a peritoneal dialysis catheter, Val said that she has a sister, her mother, patient's brother and she herself can help to the peritoneal dialysis and they will be happy to learn.    Review of Systems:   As noted above    Objective     Vitals:   Temp:  [94.4 °F (34.7 °C)-97.9 °F (36.6 °C)] 97.9 °F (36.6 °C)  Heart Rate:  [81-85] 83  Resp:  [16-22] 18  BP: (125-146)/(67-87) 146/76    Intake/Output Summary (Last 24 hours) at 2022 0916  Last data filed at 2022 0618  Gross per 24 hour   Intake 420 ml   Output 1350 ml   Net -930 ml       Physical Exam:    General Appearance: alert,  no acute distress   Skin: warm and dry  HEENT: oral mucosa normal, nonicteric sclera  Neck: supple, no JVD  Lungs: CTA  Heart: RRR, normal S1 and S2  Abdomen: Obese, soft to firm, nontender, nondistended, she does have dependent edema mostly on the back of her body including sacral area.  : no palpable bladder  Extremities: 2-3+ edema, mostly it is dependent edema, no cyanosis or clubbing  Neuro: He is able to communicate and randomly moves  extremities.    Scheduled Meds:     Current Facility-Administered Medications   Medication Dose Route Frequency Provider Last Rate Last Admin   • acetaminophen (TYLENOL) tablet 650 mg  650 mg Oral Q4H PRN Samson Reyes MD   650 mg at 03/31/22 0629    Or   • acetaminophen (TYLENOL) 160 MG/5ML solution 650 mg  650 mg Oral Q4H PRN Samson Reyes MD        Or   • acetaminophen (TYLENOL) suppository 650 mg  650 mg Rectal Q4H PRN Samson Reyes MD       • amoxicillin-clavulanate (AUGMENTIN) 500-125 MG per tablet 500 mg  1 tablet Oral Q12H Samson Reyes MD       • aspirin chewable tablet 81 mg  81 mg Oral Daily Samson Reyes MD   81 mg at 04/04/22 1005   • b complex-vitamin c-folic acid (NEPHRO-LOIS) tablet 1 tablet  1 tablet Oral Daily Samson Ryees MD   1 tablet at 04/04/22 1005   • dextrose (D50W) (25 g/50 mL) IV injection 25 g  25 g Intravenous Q15 Min PRN Samson Reyes MD       • dextrose (D50W) (25 g/50 mL) IV injection 25 g  25 g Intravenous Q15 Min PRN Samson Reyes MD       • dextrose (D50W) (25 g/50 mL) IV injection 50 mL  50 mL Intravenous Q1H PRN Samson Reyes MD   50 mL at 03/30/22 2159   • dextrose (D50W) (25 g/50 mL) IV injection 50 mL  50 mL Intravenous Q1H PRN Jerry Vargas PA-C   50 mL at 03/31/22 0630   • dextrose (GLUTOSE) oral gel 1 tube  1 tube Oral Q15 Min PRN Samson Reyes MD       • docusate sodium (COLACE) capsule 100 mg  100 mg Oral BID Samsno Reyes MD   100 mg at 04/04/22 1005   • ferrous sulfate EC tablet 324 mg  324 mg Oral BID With Meals Samson Reyes MD   324 mg at 04/04/22 1740   • glucagon (human recombinant) (GLUCAGEN DIAGNOSTIC) injection 1 mg  1 mg Subcutaneous PRN Samson Reyes MD       • guaiFENesin (MUCINEX) 12 hr tablet 600 mg  600 mg Oral Q12H Samson Reyes MD   600 mg at 04/04/22 2048   • hydrALAZINE (APRESOLINE) tablet 25 mg  25 mg Oral Q8H Milad Leone MD   25 mg at 04/05/22 0634   • insulin aspart (novoLOG) injection 0-14 Units  0-14 Units Subcutaneous TID AC  Samson Reyes MD   3 Units at 04/05/22 0634   • insulin detemir (LEVEMIR) injection 20 Units  20 Units Subcutaneous Q12H Samson Reyes MD   20 Units at 04/04/22 2100   • ipratropium-albuterol (DUO-NEB) nebulizer solution 3 mL  3 mL Nebulization Q4H PRN Samson Reyes MD       • isosorbide mononitrate (IMDUR) 24 hr tablet 30 mg  30 mg Oral Daily Samson Reyes MD   30 mg at 04/04/22 1005   • ketoconazole (NIZORAL) 2 % cream 1 application  1 application Topical BID Doc Keith MD   1 application at 04/04/22 2049   • lactobacillus acidophilus (RISAQUAD) capsule 1 capsule  1 capsule Oral BID Samson Reyes MD   1 capsule at 04/04/22 2048   • levothyroxine (SYNTHROID, LEVOTHROID) tablet 150 mcg  150 mcg Oral Daily Samson Reyes MD   150 mcg at 04/04/22 1005   • lidocaine (LIDODERM) 5 % 1 patch  1 patch Transdermal Q24H Samson Reyes MD   1 patch at 04/04/22 2048   • metoprolol succinate XL (TOPROL-XL) 24 hr tablet 100 mg  100 mg Oral Q24H Samson Reyes MD   100 mg at 04/04/22 1005   • mupirocin (BACTROBAN) 2 % cream 1 application  1 application Topical TID Doc Keith MD   1 application at 04/04/22 2049   • ondansetron (ZOFRAN) injection 4 mg  4 mg Intravenous Q6H PRN Samson Reyes MD       • pantoprazole (PROTONIX) EC tablet 40 mg  40 mg Oral Daily Samson Reyes MD   40 mg at 04/04/22 1005   • Pharmacy to Dose Zosyn   Does not apply Continuous PRN Samson Reyes MD       • silver sulfadiazine (SILVADENE, SSD) 1 % cream 1 application  1 application Topical Q12H Samson Reyes MD   1 application at 04/05/22 0244   • sodium chloride 0.9 % flush 10 mL  10 mL Intravenous PRN Samson Reyes MD       • sodium chloride 0.9 % flush 10 mL  10 mL Intravenous Q12H Samson Reyes MD   10 mL at 04/04/22 1013   • sodium chloride 0.9 % flush 10 mL  10 mL Intravenous Q12H Samson Reyes MD   10 mL at 04/04/22 1013   • sodium chloride 0.9 % flush 10 mL  10 mL Intravenous Q12H Samson Reyes MD   10 mL at 04/04/22 2049   • sodium chloride 0.9 %  flush 10 mL  10 mL Intravenous PRN Samson Reyes MD       • sodium chloride 0.9 % flush 20 mL  20 mL Intravenous PRN Samson Reyes MD       • sodium chloride 0.9 % flush 3 mL  3 mL Intravenous Q12H Samson Reyes MD   3 mL at 04/04/22 1014   • sodium chloride 0.9 % flush 3-10 mL  3-10 mL Intravenous PRN Samson Reyes MD       • spironolactone (ALDACTONE) tablet 50 mg  50 mg Oral Daily Milad Leone MD   50 mg at 04/04/22 1200       amoxicillin-clavulanate, 1 tablet, Oral, Q12H  aspirin, 81 mg, Oral, Daily  b complex-vitamin c-folic acid, 1 tablet, Oral, Daily  docusate sodium, 100 mg, Oral, BID  ferrous sulfate, 324 mg, Oral, BID With Meals  guaiFENesin, 600 mg, Oral, Q12H  hydrALAZINE, 25 mg, Oral, Q8H  insulin aspart, 0-14 Units, Subcutaneous, TID AC  insulin detemir, 20 Units, Subcutaneous, Q12H  isosorbide mononitrate, 30 mg, Oral, Daily  ketoconazole, 1 application, Topical, BID  lactobacillus acidophilus, 1 capsule, Oral, BID  levothyroxine, 150 mcg, Oral, Daily  lidocaine, 1 patch, Transdermal, Q24H  metoprolol succinate XL, 100 mg, Oral, Q24H  mupirocin, 1 application, Topical, TID  pantoprazole, 40 mg, Oral, Daily  silver sulfadiazine, 1 application, Topical, Q12H  sodium chloride, 10 mL, Intravenous, Q12H  sodium chloride, 10 mL, Intravenous, Q12H  sodium chloride, 10 mL, Intravenous, Q12H  sodium chloride, 3 mL, Intravenous, Q12H  spironolactone, 50 mg, Oral, Daily        IV Meds:   Pharmacy to Dose Zosyn,         Results Reviewed:   I have personally reviewed the results from the time of this admission to 4/5/2022 09:16 EDT     Results from last 7 days   Lab Units 04/05/22  0537 04/04/22  0955 04/03/22  0604 03/31/22  0541 03/30/22  1534   SODIUM mmol/L 140 137 141   < > 142   POTASSIUM mmol/L 3.7 4.1 4.5   < > 4.5   CHLORIDE mmol/L 98 95* 97*   < > 100   CO2 mmol/L 22.5 23.2 21.2*   < > 24.2   BUN mg/dL 104* 97* 101*   < > 75*   CREATININE mg/dL 5.01* 5.03* 4.69*   < > 3.74*   CALCIUM mg/dL 7.9* 7.6*  7.9*   < > 8.0*   BILIRUBIN mg/dL  --  0.6 0.7  --  0.7   ALK PHOS U/L  --  225* 250*  --  261*   ALT (SGPT) U/L  --  81* 73*  --  17   AST (SGOT) U/L  --  61* 77*  --  22   GLUCOSE mg/dL 191* 428* 251*   < > 40*    < > = values in this interval not displayed.       Estimated Creatinine Clearance: 17.3 mL/min (A) (by C-G formula based on SCr of 5.01 mg/dL (H)).    Results from last 7 days   Lab Units 04/05/22  0537 04/02/22  0453 04/01/22  0421 03/30/22  1534   MAGNESIUM mg/dL  --   --   --  2.5*   PHOSPHORUS mg/dL 5.3* 6.6* 6.7*  --              Results from last 7 days   Lab Units 04/05/22  0537 04/04/22  0955 04/03/22  0604 04/02/22 0453 03/31/22  0541   WBC 10*3/mm3 5.14 6.04 6.00 6.23 5.67   HEMOGLOBIN g/dL 9.9* 10.0* 9.7* 10.2* 10.3*   PLATELETS 10*3/mm3 60* 69* 77* 88* 107*       Results from last 7 days   Lab Units 04/03/22  0604 04/02/22 0453 03/31/22  1343   INR  1.84* 3.30* 2.54*       Brief Urine Lab Results  (Last result in the past 365 days)      Color   Clarity   Blood   Leuk Est   Nitrite   Protein   CREAT   Urine HCG        03/30/22 1705 Yellow   Clear   Trace   Negative   Negative   >=300 mg/dL (3+)                 No results found for: UTPCR    Imaging Results (Last 24 Hours)     ** No results found for the last 24 hours. **              Assessment / Plan     ASSESSMENT:    Acute kidney injury: It is quite likely that she has worsening renal function secondary to her baseline worsening of renal function, it is quite possible that she has not been taking her medications in has worsening cardiorenal syndrome.  I will go ahead and optimize the medications and see if we can get some improvement in her renal function.  If no improvement her only option will be to start renal replacement therapy or go to in [place] hospice for comfort care only.    Chronic kidney disease, stage IV (severe) (HCC): Baseline diabetic and hypertensive nephropathy with likely combination of cardiorenal syndrome.    Anemia  due to stage 4 chronic kidney disease (HCC): Hemoglobin appears to be fairly stable.    Chronic diastolic congestive heart failure (HCC): We will try to diurese with high-dose diuretics and see if she will respond to it.    Cor pulmonale, chronic (HCC): Longstanding history of untreated sleep apnea might be contributing.    Hypoglycemia: Oral hypoglycemic agents with prolonged effect from renal failure likely the cause.    Bilateral edema of lower extremity: She does not have much lower extremity edema most of the edema is in the abdominal and chest area appears to be having generalized anasarca.    Essential hypertension: Blood pressure is on the low side we will hold off all blood pressure medications we will go ahead and start her on midodrine to maintain blood pressure while aggressively diuresing her.    Acquired hypothyroidism: Continue home Synthroid dose.    Type 2 diabetes mellitus with nephropathy (HCC): As per hospitalist service.    Cellulitis of both lower extremities: IV antibiotics have been started.    Bradycardia: Currently bradycardic will hold beta-blockers for now.      PLAN:  Tunneled dialysis catheter placement today and initiation of dialysis.  Details were discussed with the patient as well as her POA Val.     Details were also discussed with the hospitalist service as well as nursing staff.  Continue with rest of the current treatment plan, and monitor with surveillance labs.  Further recommendations will depend on clinical course of the patient during the current hospitalization.     Thank you for involving us in the care of Millicent Mckeon.  Please feel free to call with any questions.    Milad Leone MD  04/05/22  09:16 EDT     11:48 AM I had at length discussion with the POA Val Cornell as well as Dr. Shrestha, the POA said that she will be able to help and there are multiple other family members who are willing to do her peritoneal dialysis at home.  We have made them aware that  there is a training process and we will have to start her on a hemodialysis and once she is set up for hemodialysis, we will quickly make arrangements for her to be switched to peritoneal dialysis.  I will also ask Dr. Shrestha to see if he can place the peritoneal dialysis catheter as well as the tunneled dialysis catheter tomorrow.  He said he is not sure if he can do the peritoneal dialysis catheter but for sure get the tunneled dialysis catheter placed.  Her POA is going to come and talk to her and try to see if she will understand otherwise she will honor her wishes and probably make her comfortable with palliative and hospice at home.  We will have to wait till tomorrow to make the final decision as family discussions are ongoing.    [facility]  [phone]      Much of this encounter note is an electronic transcription/translation of spoken language to printed text. The electronic translation of spoken language may permit erroneous, or at times, nonsensical words or phrases to be inadvertently transcribed; Although I have reviewed the note for such errors, some may still exist.

## 2022-04-05 NOTE — CONSULTS
Inpatient General Surgery Consult  Consult performed by: Jean Carlos Guadalupe MD  Consult ordered by: Milad Leone MD            [facility]   HISTORY AND PHYSICAL      Name:  Millicent Mckeon   Age:  61 y.o.  Sex:  female  :  1960  MRN:  [id number]   Visit Number:  [id number]  Admission Date:  3/30/2022  Date Of Service:  22  Primary Care Physician:  aMrianela Albright APRN    Chief Complaint:      renal failure    Reason for consultation: Dialysis access    History Of Presenting Illness:    Patient with acute on chronic renal failure.  Attempts have been made to treat her with medical management but has not had improvement in her renal function and I have been asked to see the patient for dialysis access.  She has associated comorbidities including congestive heart failure, diabetes etc.      Review Of Systems:     General ROS: Weakness but no current complaints of fever.  Psychological ROS: Negative    Hematological and Lymphatic ROS: Denies neck swelling or easy bleeding.  Respiratory ROS: Denies cough or shortness of breath.   Cardiovascular ROS: Denies chest pain or palpitations. No history of exertional chest pain.   Gastrointestinal ROS: No current complaints  Genito-Urinary ROS: Negative.   Neurological ROS: Other than weakness no complaints  Dermatological ROS: Multiple skin wounds.     Past Medical History:    Past Medical History:   Diagnosis Date   • A-fib (HCC)    • Anemia 10/2/2018   • Diabetes mellitus (HCC)    • Disease of thyroid gland    • GERD (gastroesophageal reflux disease)    • Gout    • History of transfusion    • Hypertension        Past Surgical history:    Past Surgical History:   Procedure Laterality Date   • EYE SURGERY     • INCISION AND DRAINAGE LEG Right 2019    Procedure: Right heel incision and drainage with graft application;  Surgeon: Ar Rueda DPM;  Location: [facility];  Service: Podiatry   • LEG SURGERY         Social  History:    Social History     Socioeconomic History   • Marital status: Single   Tobacco Use   • Smoking status: Never Smoker   • Smokeless tobacco: Never Used   Vaping Use   • Vaping Use: Never used   Substance and Sexual Activity   • Alcohol use: No   • Drug use: No   • Sexual activity: Defer       Family History:    Family History   Problem Relation Age of Onset   • Asthma Mother    • Hypertension Father    • Stroke Father        Allergies:      Metformin and related    Home Medications:    Prior to Admission Medications     Prescriptions Last Dose Informant Patient Reported? Taking?    acetaminophen (TYLENOL) 325 MG tablet Unknown  Yes No    Take 650 mg by mouth Every 4 (Four) Hours As Needed for Mild Pain .    amiodarone (PACERONE) 100 MG tablet Unknown  No No    Take 1 tablet by mouth Every Other Day.    ammonium lactate (AMLACTIN) 12 % cream Unknown  Yes No    Apply 1 application topically to the appropriate area as directed 2 (Two) Times a Day.    apixaban (ELIQUIS) 5 MG tablet tablet Unknown  No No    Take 1 tablet by mouth Every 12 (Twelve) Hours.    arginine 500 MG tablet Unknown  Yes No    Take 500 mg by mouth Daily.    aspirin 81 MG chewable tablet Unknown  Yes No    Chew 81 mg Daily.    bumetanide (Bumex) 1 MG tablet Unknown  No No    Take 1 tablet by mouth 2 (Two) Times a Day.    docusate sodium (COLACE) 100 MG capsule Unknown  Yes No    Take 100 mg by mouth 2 (Two) Times a Day.    Elastic Bandages & Supports (JOBST OPAQUE KNEE 20-30MMHG XL) misc Unknown  No No    1 application Daily.    ferrous sulfate 324 (65 Fe) MG tablet delayed-release EC tablet Unknown  Yes No    Take 324 mg by mouth 2 (Two) Times a Day With Meals.    insulin aspart (novoLOG) 100 UNIT/ML injection Unknown  Yes No    Inject  under the skin into the appropriate area as directed 2 (Two) Times a Day. Sliding scale, low dose    isosorbide mononitrate (IMDUR) 30 MG 24 hr tablet Unknown  Yes No    Take 30 mg by mouth Daily.     isosorbide mononitrate (ISMO,MONOKET) 10 MG tablet Unknown  Yes No    Take 15 mg by mouth Daily.    levothyroxine (SYNTHROID, LEVOTHROID) 175 MCG tablet Unknown  Yes No    Take 175 mcg by mouth Daily.    lidocaine (LIDODERM) 5 % Unknown  Yes No    Place 1 patch on the skin as directed by provider Daily. Apply patch to left knee daily, on at 0700 am off at 2000    loperamide (IMODIUM) 2 MG capsule Unknown  Yes No    Take 2 mg by mouth 4 (Four) Times a Day As Needed for Diarrhea.    metOLazone (ZAROXOLYN) 5 MG tablet Unknown  Yes No    Take 5 mg by mouth Daily.    metoprolol tartrate (LOPRESSOR) 100 MG tablet Unknown  No No    Take 1 tablet by mouth 2 (Two) Times a Day.    Patient taking differently:  Take 50 mg by mouth 2 (Two) Times a Day.    multivitamin (THERAGRAN) tablet tablet Unknown  Yes No    TAKE 1 TABLET BY MOUTH EVERY DAY    nitroglycerin (NITROSTAT) 0.4 MG SL tablet Unknown  Yes No    Place 0.4 mg under the tongue Every 5 (Five) Minutes As Needed for Chest Pain. Take no more than 3 doses in 15 minutes.    Nutritional Supplements (PROTEIN SUPPLEMENT 80% PO) Unknown  Yes No    Take 30 mL by mouth 2 (Two) Times a Day.    ondansetron ODT (ZOFRAN-ODT) 4 MG disintegrating tablet Unknown  Yes No    DISSOLVE 1 TABLET ON THE TONGUE EVERY 8 HOURS AS NEEDED FOR NAUSEA OR VOMITING    pantoprazole (PROTONIX) 40 MG EC tablet Unknown  Yes No    Take 40 mg by mouth Daily.    polyethylene glycol (MIRALAX) packet Unknown  Yes No    Take 17 g by mouth 2 (Two) Times a Day.    RA VITAMIN C 500 MG tablet Unknown  Yes No    Take 500 mg by mouth Daily.    saccharomyces boulardii (FLORASTOR) 250 MG capsule Unknown  Yes No    Take 250 mg by mouth 2 (Two) Times a Day.    simvastatin (ZOCOR) 20 MG tablet Unknown  Yes No    Take 20 mg by mouth Every Night.    spironolactone (ALDACTONE) 25 MG tablet Unknown  No No    Take 1 tablet by mouth 3 (Three) Times a Week. Monday, Wednesday and Friday.    Zinc Sulfate 220 (50 Zn) MG tablet  Unknown  Yes No    Take 1 tablet by mouth 2 (Two) Times a Day.             ED Medications:    Medications   sodium chloride 0.9 % flush 10 mL (has no administration in time range)   dextrose (D50W) (25 g/50 mL) IV injection 50 mL (50 mL Intravenous Given by Other 3/30/22 2159)   sodium chloride 0.9 % flush 3 mL (3 mL Intravenous Given 4/5/22 0900)   sodium chloride 0.9 % flush 3-10 mL (has no administration in time range)   acetaminophen (TYLENOL) tablet 650 mg (650 mg Oral Given 3/31/22 0629)     Or   acetaminophen (TYLENOL) 160 MG/5ML solution 650 mg ( Oral Not Given:  See Alt 3/31/22 0629)     Or   acetaminophen (TYLENOL) suppository 650 mg ( Rectal Not Given:  See Alt 3/31/22 0629)   ondansetron (ZOFRAN) injection 4 mg (has no administration in time range)   dextrose (GLUTOSE) oral gel 1 tube (has no administration in time range)   dextrose (D50W) (25 g/50 mL) IV injection 25 g (has no administration in time range)   glucagon (human recombinant) (GLUCAGEN DIAGNOSTIC) injection 1 mg (has no administration in time range)   aspirin chewable tablet 81 mg (81 mg Oral Not Given 4/5/22 0943)   docusate sodium (COLACE) capsule 100 mg (100 mg Oral Not Given 4/5/22 0943)   ferrous sulfate EC tablet 324 mg (324 mg Oral Not Given 4/5/22 0944)   levothyroxine (SYNTHROID, LEVOTHROID) tablet 150 mcg (150 mcg Oral Not Given 4/5/22 1007)   lidocaine (LIDODERM) 5 % 1 patch (1 patch Transdermal Medication Removed 4/5/22 0900)   pantoprazole (PROTONIX) EC tablet 40 mg (40 mg Oral Not Given 4/5/22 1005)   lactobacillus acidophilus (RISAQUAD) capsule 1 capsule (1 capsule Oral Not Given 4/5/22 0945)   ipratropium-albuterol (DUO-NEB) nebulizer solution 3 mL (has no administration in time range)   guaiFENesin (MUCINEX) 12 hr tablet 600 mg (600 mg Oral Not Given 4/5/22 0944)   dextrose (D50W) (25 g/50 mL) IV injection 50 mL (50 mL Intravenous Given 3/31/22 0630)   Pharmacy to Dose Zosyn (has no administration in time range)   sodium  chloride 0.9 % flush 10 mL (10 mL Intravenous Given 4/5/22 0900)   sodium chloride 0.9 % flush 10 mL (10 mL Intravenous Given 4/5/22 0900)   sodium chloride 0.9 % flush 10 mL (10 mL Intravenous Given 4/5/22 0900)   sodium chloride 0.9 % flush 10 mL (has no administration in time range)   sodium chloride 0.9 % flush 20 mL (has no administration in time range)   silver sulfadiazine (SILVADENE, SSD) 1 % cream 1 application (1 application Topical Given 4/5/22 0244)   isosorbide mononitrate (IMDUR) 24 hr tablet 30 mg (30 mg Oral Not Given 4/5/22 0944)   hydrALAZINE (APRESOLINE) tablet 25 mg (25 mg Oral Given 4/5/22 0634)   sodium polystyrene (KAYEXALATE) 15 GM/60ML suspension  - ADS Override Pull (  Not Given 4/1/22 1027)   b complex-vitamin c-folic acid (NEPHRO-LOIS) tablet 1 tablet (1 tablet Oral Not Given 4/5/22 0943)   metoprolol succinate XL (TOPROL-XL) 24 hr tablet 100 mg (100 mg Oral Not Given 4/5/22 1007)   ketoconazole (NIZORAL) 2 % cream 1 application (1 application Topical Given 4/4/22 2049)   mupirocin (BACTROBAN) 2 % cream 1 application (1 application Topical Given 4/4/22 2049)   insulin detemir (LEVEMIR) injection 20 Units (20 Units Subcutaneous Given 4/4/22 2100)   dextrose (D50W) (25 g/50 mL) IV injection 25 g (has no administration in time range)   insulin aspart (novoLOG) injection 0-14 Units (3 Units Subcutaneous Given 4/5/22 0634)   spironolactone (ALDACTONE) tablet 50 mg (50 mg Oral Not Given 4/5/22 0900)   amoxicillin-clavulanate (AUGMENTIN) 500-125 MG per tablet 500 mg (500 mg Oral Not Given 4/5/22 0943)   cefTRIAXone (ROCEPHIN) IVPB 1 g/50ml dextrose (premix) (0 g Intravenous Stopped 3/30/22 2040)   azithromycin (ZITHROMAX) 500 mg 0.9% NaCl (Add-vantage) 250 mL (0 mg Intravenous Stopped 3/31/22 0009)   azithromycin (ZITHROMAX) tablet 250 mg (250 mg Oral Given 4/3/22 2949)   Morphine sulfate (PF) injection 2 mg (2 mg Intravenous Given 3/30/22 2137)   furosemide (LASIX) 120 mg in sodium chloride 0.9  % IVPB (120 mg Intravenous Given 4/2/22 0904)   piperacillin-tazobactam (ZOSYN) 4.5 g in iso-osmotic dextrose 100 mL IVPB (premix) (0 g Intravenous Stopped 3/31/22 1555)   vancomycin 2500 mg/500 mL 0.9% NS IVPB ([facility]) (0 mg Intravenous Stopped 3/31/22 1830)   albumin human 25 % IV SOLN 50 g (0 g Intravenous Stopped 3/31/22 1500)   piperacillin-tazobactam (ZOSYN) 4.5 g in iso-osmotic dextrose 100 mL IVPB (premix) (4.5 g Intravenous New Bag 4/5/22 0314)   sodium polystyrene (KAYEXALATE) 15 GM/60ML suspension 30 g (30 g Oral Given 4/1/22 1009)   furosemide (LASIX) injection 80 mg (80 mg Intravenous Given 4/2/22 2341)   furosemide (LASIX) 200 mg in sodium chloride 0.9 % IVPB (200 mg Intravenous Given 4/3/22 1923)   bumetanide (BUMEX) injection 4 mg (4 mg Intravenous Given 4/4/22 1749)   albumin human 25 % IV SOLN 25 g (25 g Intravenous New Bag 4/4/22 1200)       Vital Signs:    Temp:  [96.2 °F (35.7 °C)-98 °F (36.7 °C)] 98 °F (36.7 °C)  Heart Rate:  [82-85] 83  Resp:  [16-20] 18  BP: (125-146)/(67-87) 139/74        04/01/22  1536 04/02/22  0500 04/02/22  1717   Weight: (!) 152 kg (335 lb 1.6 oz) (!) 147 kg (324 lb 3.2 oz) (!) 150 kg (330 lb 4 oz)       Body mass index is 53.46 kg/m².    Physical Exam:      General Appearance:  Alert and cooperative, not in any acute distress.   Respiratory/Lungs:   Breath sounds heard bilaterally equally.  No crackles or wheezing. No Pleural rub or bronchial breathing. Normal respiratory effort.    Cardiovascular/Heart:  Normal S1 and S2, no murmur.    GI/Abdomen:    Morbidly obese and nondistended and nontender.  Healing multiple abdominal wounds.  Slight panniculitis versus edema   Skin:  Multiple healing skin wounds.   Psychiatric : Alert and oriented ×3.     Neurologic: Cranial nerves 2 - 12 grossly intact, sensation intact, Motor power is normal and equal bilaterally.       EKG:          Labs:    Lab Results (last 24 hours)     Procedure Component Value Units Date/Time    POC  Glucose Once [[id number]]  (Abnormal) Collected: 04/05/22 1052    Specimen: Blood Updated: 04/05/22 1101     Glucose 136 mg/dL      Comment: Serial Number: [id number]:  [id number]       Renal Function Panel [[id number]]  (Abnormal) Collected: 04/05/22 0537    Specimen: Blood Updated: 04/05/22 0623     Glucose 191 mg/dL       mg/dL      Creatinine 5.01 mg/dL      Sodium 140 mmol/L      Potassium 3.7 mmol/L      Chloride 98 mmol/L      CO2 22.5 mmol/L      Calcium 7.9 mg/dL      Albumin 3.50 g/dL      Phosphorus 5.3 mg/dL      Anion Gap 19.5 mmol/L      BUN/Creatinine Ratio 20.8     eGFR 9.2 mL/min/1.73      Comment: <15 Indicative of kidney failure       Narrative:      GFR Normal >60  Chronic Kidney Disease <60  Kidney Failure <15      POC Glucose Once [[id number]]  (Abnormal) Collected: 04/05/22 0610    Specimen: Blood Updated: 04/05/22 0617     Glucose 197 mg/dL      Comment: Serial Number: [id number]:  [id number]       CBC (No Diff) [[id number]]  (Abnormal) Collected: 04/05/22 0537    Specimen: Blood Updated: 04/05/22 0612     WBC 5.14 10*3/mm3      RBC 4.21 10*6/mm3      Hemoglobin 9.9 g/dL      Hematocrit 32.0 %      MCV 76.0 fL      MCH 23.5 pg      MCHC 30.9 g/dL      RDW 21.9 %      RDW-SD 55.2 fl      MPV --     Comment: Test not performed         Platelets 60 10*3/mm3     POC Glucose Once [[id number]]  (Abnormal) Collected: 04/04/22 1935    Specimen: Blood Updated: 04/04/22 1941     Glucose 242 mg/dL      Comment: Serial Number: [id number]:  [id number]       Blood Culture - Blood, Hand, Right [[id number]]  (Normal) Collected: 03/30/22 1716    Specimen: Blood from Hand, Right Updated: 04/04/22 1747     Blood Culture No growth at 5 days    Blood Culture - Blood, Hand, Right [[id number]]  (Normal) Collected: 03/30/22 1716    Specimen: Blood from Hand, Right Updated: 04/04/22 1689     Blood Culture No growth at 5 days    POC Glucose Once [[id number]]  (Abnormal) Collected: 04/04/22 1554    Specimen:  Blood Updated: 04/04/22 1602     Glucose 249 mg/dL      Comment: Serial Number: [id number]:  [id number]             Radiology:    Imaging Results (Last 72 Hours)     ** No results found for the last 72 hours. **          Assessment:    Renal failure    Plan:     I have been asked to place a dialysis catheter as well as consideration for a peritoneal dialysis catheter placement.  She still has some healing wounds on her abdomen and questionable panniculitis and given this and her morbid obesity and comorbidities I do not recommend a peritoneal dialysis catheter currently.  My plan is to consider placement of this as an outpatient once she has received dialysis which will hopefully help some of the edema.  I would also like her wounds to completely clear.  In the meantime we will go ahead and place the cervical tunneled dialysis catheter.  I explained this to both the patient and her niece and explained the risks of bleeding, infection as well as pneumothorax.  They both are agreeable to proceed.    Addendum: I discussed this patient with anesthesia.  Anesthesia is extremely reluctant on proceeding with any significant anesthesia due to her overall health.  At this time the patient is not a candidate for a peritoneal dialysis catheter from this standpoint as well given that this will be difficult to place especially due to her morbid obesity and without at least some sedation      Jean Carlos Guadalupe MD  04/05/22  12:54 EDT  .

## 2022-04-05 NOTE — NURSING NOTE
Dr Guadalupe to bedside. He spoke with JOAQUIN Neal, over the phone about deferring the peritoneal dialysis catheter placement but moving forward with the cervical tunneled catheter.

## 2022-04-05 NOTE — THERAPY EVALUATION
Patient is having a procedure and is not available to work with PT.  PT will follow up tomorrow.

## 2022-04-05 NOTE — ANESTHESIA PREPROCEDURE EVALUATION
Anesthesia Evaluation     Patient summary reviewed and Nursing notes reviewed   no history of anesthetic complications:  NPO Solid Status: > 8 hours  NPO Liquid Status: > 8 hours           Airway   Mallampati: II  TM distance: >3 FB  Neck ROM: full  Possible difficult intubation  Dental - normal exam   (+) edentulous    Pulmonary    (+) decreased breath sounds,   (-) not a smoker    ROS comment: Pulmonary HTN   Cor pulmonale, chronic   Cardiovascular   Exercise tolerance: poor (<4 METS)    ECG reviewed  PT is on anticoagulation therapy  Patient on routine beta blocker and Beta blocker given within 24 hours of surgery  Rhythm: irregular  Rate: abnormal    (+) hypertension poorly controlled 2 medications or greater, dysrhythmias (Tachycardia-bradycardia syndrome) Atrial Fib, Paroxysmal Atrial Fib, CHF Diastolic >=55%,     ROS comment: EKG: Rhythm: atrial flutter     ECHO: 3/4/22  · The left ventricular cavity is mildly dilated.  · Left ventricular wall thickness is consistent with mild concentric hypertrophy.  · Estimated left ventricular EF = 48% Left ventricular ejection fraction appears to be 46 - 50%. Left ventricular systolic function is low normal.  · Left ventricular diastolic function is consistent with (grade II w/high LAP) pseudonormalization.  · The right atrial cavity is mildly dilated.  · The right ventricular cavity is mildly dilated.  · There is moderate calcification of the aortic valve mainly affecting the non-coronary, left coronary and right coronary cusp(s).  · Moderate to severe tricuspid valve regurgitation is present.  · Estimated right ventricular systolic pressure from tricuspid regurgitation is markedly elevated (>55 mmHg). Calculated right ventricular systolic pressure from tricuspid regurgitation is 63 mmHg.  · Severe pulmonary hypertension is present.        Neuro/Psych- negative ROS  GI/Hepatic/Renal/Endo    (+) obesity, morbid obesity, GERD poorly controlled,  renal disease ARF and CRI,  diabetes mellitus type 2 poorly controlled using insulin, thyroid problem hypothyroidism    Musculoskeletal     Abdominal    Substance History      OB/GYN          Other   arthritis (gout ), blood dyscrasia anemia thrombocytopenia,     ROS/Med Hx Other: Cellulitis bilateral lower extremities  Impaired functional mobility and activity tolerance  functional quadriplegia    9.9/32  K 3.7  Glucose 197 at 0610                  Anesthesia Plan    ASA 4 - emergent     MAC   (Risks and benefits discussed including risk of aspiration, recall and dental damage. All patient questions answered. Will continue with POC.)  intravenous induction     Anesthetic plan, all risks, benefits, and alternatives have been provided, discussed and informed consent has been obtained with: patient.        CODE STATUS:    Medical Intervention Limits: NO intubation (DNI); NO cardioversion; NO dialysis  Level Of Support Discussed With: Patient  Code Status (Patient has no pulse and is not breathing): No CPR (Do Not Attempt to Resuscitate)  Medical Interventions (Patient has pulse or is breathing): Limited Support

## 2022-04-05 NOTE — NURSING NOTE
"Pt A&O, sad, depressed, and flat affect noted this shift. Pt was compliant with all medication therapies. However, adamantly refuses dialysis and expresses her desire to, \"Go home and do rehab by herself.\" Per Val NUÑEZ, she will speak with patient tonight about a tentative schedule pending the patients approval, to place a tunnel cath tomorrow.   "

## 2022-04-06 LAB
ALBUMIN SERPL-MCNC: 3.4 G/DL (ref 3.5–5.2)
ANION GAP SERPL CALCULATED.3IONS-SCNC: 16.6 MMOL/L (ref 5–15)
ANISOCYTOSIS BLD QL: NORMAL
BASOPHILS # BLD AUTO: 0.03 10*3/MM3 (ref 0–0.2)
BASOPHILS NFR BLD AUTO: 0.6 % (ref 0–1.5)
BUN SERPL-MCNC: 64 MG/DL (ref 8–23)
BUN/CREAT SERPL: 16.8 (ref 7–25)
CALCIUM SPEC-SCNC: 8.2 MG/DL (ref 8.6–10.5)
CHLORIDE SERPL-SCNC: 98 MMOL/L (ref 98–107)
CO2 SERPL-SCNC: 22.4 MMOL/L (ref 22–29)
CREAT SERPL-MCNC: 3.81 MG/DL (ref 0.57–1)
DEPRECATED RDW RBC AUTO: 56.2 FL (ref 37–54)
EGFRCR SERPLBLD CKD-EPI 2021: 12.7 ML/MIN/1.73
EOSINOPHIL # BLD AUTO: 0.32 10*3/MM3 (ref 0–0.4)
EOSINOPHIL NFR BLD AUTO: 6.6 % (ref 0.3–6.2)
ERYTHROCYTE [DISTWIDTH] IN BLOOD BY AUTOMATED COUNT: 22.3 % (ref 12.3–15.4)
GLUCOSE BLDC GLUCOMTR-MCNC: 115 MG/DL (ref 70–130)
GLUCOSE BLDC GLUCOMTR-MCNC: 138 MG/DL (ref 70–130)
GLUCOSE BLDC GLUCOMTR-MCNC: 181 MG/DL (ref 70–130)
GLUCOSE BLDC GLUCOMTR-MCNC: 270 MG/DL (ref 70–130)
GLUCOSE SERPL-MCNC: 142 MG/DL (ref 65–99)
HAV IGM SERPL QL IA: NORMAL
HBV CORE IGM SERPL QL IA: NORMAL
HBV SURFACE AG SERPL QL IA: NORMAL
HCT VFR BLD AUTO: 31.5 % (ref 34–46.6)
HCV AB SER DONR QL: NORMAL
HGB BLD-MCNC: 9.8 G/DL (ref 12–15.9)
IMM GRANULOCYTES # BLD AUTO: 0.03 10*3/MM3 (ref 0–0.05)
IMM GRANULOCYTES NFR BLD AUTO: 0.6 % (ref 0–0.5)
LYMPHOCYTES # BLD AUTO: 0.42 10*3/MM3 (ref 0.7–3.1)
LYMPHOCYTES NFR BLD AUTO: 8.6 % (ref 19.6–45.3)
MCH RBC QN AUTO: 23.8 PG (ref 26.6–33)
MCHC RBC AUTO-ENTMCNC: 31.1 G/DL (ref 31.5–35.7)
MCV RBC AUTO: 76.6 FL (ref 79–97)
MONOCYTES # BLD AUTO: 0.34 10*3/MM3 (ref 0.1–0.9)
MONOCYTES NFR BLD AUTO: 7 % (ref 5–12)
NEUTROPHILS NFR BLD AUTO: 3.74 10*3/MM3 (ref 1.7–7)
NEUTROPHILS NFR BLD AUTO: 76.6 % (ref 42.7–76)
NRBC BLD AUTO-RTO: 0.4 /100 WBC (ref 0–0.2)
PHOSPHATE SERPL-MCNC: 4.5 MG/DL (ref 2.5–4.5)
PLATELET # BLD AUTO: 55 10*3/MM3 (ref 140–450)
POIKILOCYTOSIS BLD QL SMEAR: NORMAL
POTASSIUM SERPL-SCNC: 3.6 MMOL/L (ref 3.5–5.2)
RBC # BLD AUTO: 4.11 10*6/MM3 (ref 3.77–5.28)
SMALL PLATELETS BLD QL SMEAR: NORMAL
SODIUM SERPL-SCNC: 137 MMOL/L (ref 136–145)
WBC MORPH BLD: NORMAL
WBC NRBC COR # BLD: 4.88 10*3/MM3 (ref 3.4–10.8)

## 2022-04-06 PROCEDURE — 97116 GAIT TRAINING THERAPY: CPT

## 2022-04-06 PROCEDURE — 63710000001 INSULIN DETEMIR PER 5 UNITS: Performed by: FAMILY MEDICINE

## 2022-04-06 PROCEDURE — 99232 SBSQ HOSP IP/OBS MODERATE 35: CPT | Performed by: FAMILY MEDICINE

## 2022-04-06 PROCEDURE — 63710000001 INSULIN ASPART PER 5 UNITS: Performed by: FAMILY MEDICINE

## 2022-04-06 PROCEDURE — 97166 OT EVAL MOD COMPLEX 45 MIN: CPT

## 2022-04-06 PROCEDURE — 85025 COMPLETE CBC W/AUTO DIFF WBC: CPT | Performed by: FAMILY MEDICINE

## 2022-04-06 PROCEDURE — 87081 CULTURE SCREEN ONLY: CPT | Performed by: FAMILY MEDICINE

## 2022-04-06 PROCEDURE — 82962 GLUCOSE BLOOD TEST: CPT

## 2022-04-06 PROCEDURE — 80069 RENAL FUNCTION PANEL: CPT | Performed by: FAMILY MEDICINE

## 2022-04-06 PROCEDURE — 97530 THERAPEUTIC ACTIVITIES: CPT

## 2022-04-06 PROCEDURE — 5A1D70Z PERFORMANCE OF URINARY FILTRATION, INTERMITTENT, LESS THAN 6 HOURS PER DAY: ICD-10-PCS | Performed by: INTERNAL MEDICINE

## 2022-04-06 PROCEDURE — 85007 BL SMEAR W/DIFF WBC COUNT: CPT | Performed by: FAMILY MEDICINE

## 2022-04-06 RX ADMIN — Medication 1 CAPSULE: at 08:27

## 2022-04-06 RX ADMIN — INSULIN DETEMIR 10 UNITS: 100 INJECTION, SOLUTION SUBCUTANEOUS at 20:46

## 2022-04-06 RX ADMIN — Medication 10 ML: at 08:29

## 2022-04-06 RX ADMIN — HYDRALAZINE HYDROCHLORIDE 25 MG: 25 TABLET ORAL at 05:34

## 2022-04-06 RX ADMIN — FERROUS SULFATE TAB EC 324 MG (65 MG FE EQUIVALENT) 324 MG: 324 (65 FE) TABLET DELAYED RESPONSE at 17:27

## 2022-04-06 RX ADMIN — KETOCONAZOLE 1 APPLICATION: 20 CREAM TOPICAL at 20:46

## 2022-04-06 RX ADMIN — Medication 10 ML: at 09:00

## 2022-04-06 RX ADMIN — Medication 1 TABLET: at 08:27

## 2022-04-06 RX ADMIN — LIDOCAINE 1 PATCH: 50 PATCH CUTANEOUS at 20:44

## 2022-04-06 RX ADMIN — DOCUSATE SODIUM 100 MG: 100 CAPSULE, LIQUID FILLED ORAL at 20:44

## 2022-04-06 RX ADMIN — KETOCONAZOLE 1 APPLICATION: 20 CREAM TOPICAL at 08:32

## 2022-04-06 RX ADMIN — MUPIROCIN 1 APPLICATION: 2 CREAM TOPICAL at 08:32

## 2022-04-06 RX ADMIN — ASPIRIN 81 MG: 81 TABLET, CHEWABLE ORAL at 08:27

## 2022-04-06 RX ADMIN — INSULIN DETEMIR 10 UNITS: 100 INJECTION, SOLUTION SUBCUTANEOUS at 08:32

## 2022-04-06 RX ADMIN — PANTOPRAZOLE SODIUM 40 MG: 40 TABLET, DELAYED RELEASE ORAL at 08:27

## 2022-04-06 RX ADMIN — MUPIROCIN 1 APPLICATION: 2 CREAM TOPICAL at 20:46

## 2022-04-06 RX ADMIN — AMOXICILLIN AND CLAVULANATE POTASSIUM 500 MG: 500; 125 TABLET, FILM COATED ORAL at 08:26

## 2022-04-06 RX ADMIN — Medication 10 ML: at 20:45

## 2022-04-06 RX ADMIN — Medication 1 CAPSULE: at 20:44

## 2022-04-06 RX ADMIN — DOCUSATE SODIUM 100 MG: 100 CAPSULE, LIQUID FILLED ORAL at 08:27

## 2022-04-06 RX ADMIN — SILVER SULFADIAZINE 1 APPLICATION: 10 CREAM TOPICAL at 16:00

## 2022-04-06 RX ADMIN — METOPROLOL SUCCINATE 100 MG: 100 TABLET, EXTENDED RELEASE ORAL at 16:02

## 2022-04-06 RX ADMIN — FERROUS SULFATE TAB EC 324 MG (65 MG FE EQUIVALENT) 324 MG: 324 (65 FE) TABLET DELAYED RESPONSE at 08:27

## 2022-04-06 RX ADMIN — GUAIFENESIN 600 MG: 600 TABLET, EXTENDED RELEASE ORAL at 08:27

## 2022-04-06 RX ADMIN — Medication 3 ML: at 20:45

## 2022-04-06 RX ADMIN — SILVER SULFADIAZINE 1 APPLICATION: 10 CREAM TOPICAL at 20:46

## 2022-04-06 RX ADMIN — LEVOTHYROXINE SODIUM 150 MCG: 150 TABLET ORAL at 08:27

## 2022-04-06 RX ADMIN — ISOSORBIDE MONONITRATE 30 MG: 30 TABLET, EXTENDED RELEASE ORAL at 16:02

## 2022-04-06 RX ADMIN — INSULIN ASPART 8 UNITS: 100 INJECTION, SOLUTION INTRAVENOUS; SUBCUTANEOUS at 11:39

## 2022-04-06 RX ADMIN — AMOXICILLIN AND CLAVULANATE POTASSIUM 500 MG: 500; 125 TABLET, FILM COATED ORAL at 20:44

## 2022-04-06 RX ADMIN — Medication 3 ML: at 09:00

## 2022-04-06 NOTE — PLAN OF CARE
Goal Outcome Evaluation:  Plan of Care Reviewed With: patient        Progress: improving  Outcome Evaluation: Patient demonstrates improving strength, balance and activity tolerance as seen by performance of transfers and gait.  She requires minimal assistance for mobility tasks, including walking 1 foot to the right toward the head of the bed.  She reports she is feeling better, but still feels weak.  She is expected to benefit from additional PT services per POC.

## 2022-04-06 NOTE — CONSULTS
Patient: Millicent Mckeon  Procedure(s):  HEMODIALYSIS CATHETER INSERTION  Anesthesia type: MAC    Patient location: [facility] Surgical Floor  Last vitals:   Vitals:    04/06/22 1100   BP: 136/62   Pulse: 94   Resp: 18   Temp: 97.2 °F (36.2 °C)   SpO2: 99%     Level of consciousness: awake, alert and oriented    Post-anesthesia pain: adequate analgesia  Airway patency: patent  Respiratory: unassisted  Cardiovascular: stable and blood pressure at baseline  Hydration: hypervolemic    Anesthetic complications: no, pt in dialysis, spoke with RN, pt at baseline, no complications noted

## 2022-04-06 NOTE — THERAPY EVALUATION
Patient Name: Millicent Mckeon  : 1960    MRN: [id number]                              Today's Date: 2022       Admit Date: 3/30/2022    Visit Dx:     ICD-10-CM ICD-9-CM   1. Hypoglycemia  E16.2 251.2   2. Bradycardia  R00.1 427.89   3. Failure to thrive in adult  R62.7 783.7   4. Pneumonia of right lung due to infectious organism, unspecified part of lung  J18.9 483.8     Patient Active Problem List   Diagnosis   • Altered mental status   • Bilateral edema of lower extremity   • Cellulitis of lower extremity   • Acute on chronic renal failure (HCC)   • GERD (gastroesophageal reflux disease)   • Hyperkalemia   • Essential hypertension   • Acquired hypothyroidism   • Type 2 diabetes mellitus with nephropathy ([facility])   • Vitamin B12 deficiency   • Cellulitis of both lower extremities   • Anemia due to stage 4 chronic kidney disease ([facility])   • Acute renal failure (ARF) ([facility])   • Hyperkalemia   • Impaired functional mobility and activity tolerance   • Chronic diastolic congestive heart failure ([facility])   • Paroxysmal atrial fibrillation with rapid ventricular response ([facility])   • Pulmonary hypertension ([facility])   • Cor pulmonale, chronic ([facility])   • Chronic venous hypertension involving both sides   • Lymphedema of both lower extremities   • Obesity, morbid, BMI 50 or higher ([facility])   • A-fib ([facility])   • CKD (chronic kidney disease) stage 3, GFR 30-59 ml/min ([facility])   • Acute bronchitis due to other specified organisms   • Tachycardia-bradycardia syndrome ([facility])   • Pressure ulcer of right heel, unstageable ([facility])   • Mucopurulent chronic bronchitis ([facility])   • Acute pulmonary edema ([facility])   • Thrombocytopenia, unspecified ([facility])   • Chronic kidney disease, stage IV (severe) ([facility])   • Hypoglycemia   • Bradycardia     Past Medical History:   Diagnosis Date   • A-fib ([facility])    • Anemia 10/2/2018   • Diabetes mellitus ([facility])    • Disease of thyroid gland    • GERD (gastroesophageal reflux disease)    • Gout    • History of transfusion     • Hypertension      Past Surgical History:   Procedure Laterality Date   • EYE SURGERY     • INCISION AND DRAINAGE LEG Right 1/14/2019    Procedure: Right heel incision and drainage with graft application;  Surgeon: Ar Rueda DPM;  Location: [facility];  Service: Podiatry   • INSERTION HEMODIALYSIS CATHETER N/A 4/5/2022    Procedure: HEMODIALYSIS CATHETER INSERTION;  Surgeon: Jean Carlos Guadalupe MD;  Location: [facility];  Service: General;  Laterality: N/A;   • LEG SURGERY        General Information     Row Name 04/06/22 1554          OT Time and Intention    Document Type evaluation  -     Mode of Treatment occupational therapy  -[facility] Name 04/06/22 1554          General Information    Patient Profile Reviewed yes  -     Prior Level of Function independent:;ADL's;transfer  -     Existing Precautions/Restrictions fall  -     Barriers to Rehab medically complex;previous functional deficit  -[facility] Name 04/06/22 1554          Occupational Profile    Reason for Services/Referral (Occupational Profile) ADL decline  -[facility] Name 04/06/22 1554          Living Environment    People in Home alone  -[facility] Name 04/06/22 1554          Home Main Entrance    Number of Stairs, Main Entrance none  -[facility] Name 04/06/22 1554          Stairs Within Home, Primary    Number of Stairs, Within Home, Primary none  -[facility] Name 04/06/22 1554          Cognition    Orientation Status (Cognition) oriented x 4  -[facility] Name 04/06/22 1554          Safety Issues, Functional Mobility    Safety Issues Affecting Function (Mobility) friction/shear risk;insight into deficits/self-awareness;safety precaution awareness;safety precautions follow-through/compliance  -     Impairments Affecting Function (Mobility) balance;endurance/activity tolerance;strength  -           User Key  (r) = Recorded By, (t) = Taken By, (c) = Cosigned By    Initials Name Provider Type     Noemí Redding  Therapist                 Mobility/ADL's     [facility] 04/06/22 [facility]          Bed Mobility    Bed Mobility supine-sit;sit-supine  -     Supine-Sit [place] (Bed Mobility) minimum assist (75% patient effort)  -     Sit-Supine [place] (Bed Mobility) minimum assist (75% patient effort)  -     Assistive Device (Bed Mobility) head of bed elevated;bed rails  -AH     Row Name 04/06/22 1557          Transfers    Transfers sit-stand transfer  -     Sit-Stand [place] (Transfers) minimum assist (75% patient effort)  -AH     Row Name 04/06/22 [facility]          Sit-Stand Transfer    Assistive Device (Sit-Stand Transfers) walker, front-wheeled  -     Comment, (Sit-Stand Transfer) pt unable to come to full stand  -AH     Row Name 04/06/22 [facility]          Functional Mobility    Functional Mobility- Ind. Level minimum assist (75% patient effort)  -     Functional Mobility- Device rolling walker  -     Functional Mobility-Distance (Feet) 1  -AH     Row Name 04/06/22 1557          Activities of Daily Living    BADL Assessment/Intervention bathing;upper body dressing;lower body dressing;clothing fastener management;grooming;feeding;toileting  -AH     Row Name 04/06/22 [facility]          Bathing Assessment/Intervention    [place] Level (Bathing) moderate assist (50% patient effort)  -AH     Row Name 04/06/22 1557          Upper Body Dressing Assessment/Training    [place] Level (Upper Body Dressing) minimum assist (75% patient effort)  -AH     Row Name 04/06/22 155          Lower Body Dressing Assessment/Training    [place] Level (Lower Body Dressing) maximum assist (25% patient effort)  -AH     Row Name 04/06/22 [facility]          Clothes Fastener Management    [place] Level (Clothes Fastener Management) minimum assist (75% patient effort)  -AH     Row Name 04/06/22 [facility]          Grooming Assessment/Training    [place] Level (Grooming) set up  -AH     Row Name 04/06/22 1557          Self-Feeding  Assessment/Training    [place] Level (Feeding) set up  -AH     Row Name 04/06/22 1557          Toileting Assessment/Training    [place] Level (Toileting) maximum assist (25% patient effort)  -           User Key  (r) = Recorded By, (t) = Taken By, (c) = Cosigned By    Initials Name Provider Type    Noemí Fay Occupational Therapist               Obj/Interventions     Row Name 04/06/22 1600          Sensory Assessment (Somatosensory)    Sensory Assessment (Somatosensory) sensation intact  -AH     Row Name 04/06/22 1600          Vision Assessment/Intervention    Visual Impairment/Limitations L  -AH     Row Name 04/06/22 1600          Range of Motion Comprehensive    General Range of Motion bilateral upper extremity ROM L  -AH     Row Name 04/06/22 1600          Strength Comprehensive (MMT)    Comment, General Manual Muscle Testing (MMT) Assessment BUE 4-/5  -           User Key  (r) = Recorded By, (t) = Taken By, (c) = Cosigned By    Initials Name Provider Type     Noemí Redding Occupational Therapist               Goals/Plan     Row Name 04/06/22 1605          Bed Mobility Goal 1 (OT)    Activity/Assistive Device (Bed Mobility Goal 1, OT) supine to sit  -     [place] Level/Cues Needed (Bed Mobility Goal 1, OT) contact guard required  -     Time Frame (Bed Mobility Goal 1, OT) by discharge  -     Progress/Outcomes (Bed Mobility Goal 1, OT) goal ongoing  -AH     Row Name 04/06/22 1605          Transfer Goal 1 (OT)    Activity/Assistive Device (Transfer Goal 1, OT) sit-to-stand/stand-to-sit;walker, rolling  -     [place] Level/Cues Needed (Transfer Goal 1, OT) contact guard required  -     Time Frame (Transfer Goal 1, OT) long term goal (LTG);1 week  -     Progress/Outcome (Transfer Goal 1, OT) goal ongoing  -AH     Row Name 04/06/22 1605          Bathing Goal 1 (OT)    Activity/Device (Bathing Goal 1, OT) upper body bathing  -     [place] Level/Cues Needed  (Bathing Goal 1, OT) minimum assist (75% or more patient effort)  -     Time Frame (Bathing Goal 1, OT) by discharge  -     Progress/Outcomes (Bathing Goal 1, OT) goal ongoing  -[facility] Name 04/06/22 1605          Dressing Goal 1 (OT)    Activity/Device (Dressing Goal 1, OT) lower body dressing;reacher;sock-aid  -     [place]/Cues Needed (Dressing Goal 1, OT) minimum assist (75% or more patient effort)  -     Time Frame (Dressing Goal 1, OT) long term goal (LTG);1 week  -     Progress/Outcome (Dressing Goal 1, OT) goal ongoing  -[facility] Name 04/06/22 1605          Strength Goal 1 (OT)    Strength Goal 1 (OT) Pt will perform UB strengthening ex using theraband for resistance.  -     Time Frame (Strength Goal 1, OT) by discharge  -     Progress/Outcome (Strength Goal 1, OT) goal ongoing  -[facility] Name 04/06/22 1605          Therapy Assessment/Plan (OT)    Planned Therapy Interventions (OT) activity tolerance training;BADL retraining;patient/caregiver education/training;transfer/mobility retraining;strengthening exercise  -           User Key  (r) = Recorded By, (t) = Taken By, (c) = Cosigned By    Initials Name Provider Type    Noemí Fay Occupational Therapist               Clinical Impression     [facility] Name 04/06/22 1600          Pain Assessment    Pretreatment Pain Rating 0/10 - no pain  -     Posttreatment Pain Rating 0/10 - no pain  -[facility] Name 04/06/22 1600          Plan of Care Review    Plan of Care Reviewed With patient  -     Progress no change  -     Outcome Evaluation Pt seen for OT evaluation today.  Pt presents with decreased strength, endurance and independence with ADL tasks.  Pt min assist to sit eob, min assist to stand and min assist to walk 1' to head of bed.  Pt is expected to benefit from skilled OT to improve her strength and independence with ADL tasks.  -[facility] Name 04/06/22 1600          Therapy Assessment/Plan (OT)    Patient/Family Therapy  Goal Statement (OT) pt wants to return home upon d/c  -     Rehab Potential (OT) good, to achieve stated therapy goals  -     Criteria for Skilled Therapeutic Interventions Met (OT) yes;skilled treatment is necessary  -     Therapy Frequency (OT) 3 times/wk  -     Row Name 04/06/22 1600          Therapy Plan Review/Discharge Plan (OT)    Anticipated Discharge Disposition (OT) inpatient rehabilitation facility  -     Row Name 04/06/22 1600          Positioning and Restraints    Pre-Treatment Position in bed  -     Post Treatment Position bed  -     In Bed supine;call light within reach;encouraged to call for assist;exit alarm on  -           User Key  (r) = Recorded By, (t) = Taken By, (c) = Cosigned By    Initials Name Provider Type    Noemí Fay Occupational Therapist               Outcome Measures     Row Name 04/06/22 1619          How much help from another is currently needed...    Putting on and taking off regular lower body clothing? 2  -AH     Bathing (including washing, rinsing, and drying) 2  -AH     Toileting (which includes using toilet bed pan or urinal) 2  -AH     Putting on and taking off regular upper body clothing 3  -AH     Taking care of personal grooming (such as brushing teeth) 3  -AH     Eating meals 3  -     AM-PAC 6 Clicks Score (OT) 15  -     Row Name 04/06/22 1115          How much help from another person do you currently need...    Turning from your back to your side while in flat bed without using bedrails? 3  -JR     Moving from lying on back to sitting on the side of a flat bed without bedrails? 3  -JR     Moving to and from a bed to a chair (including a wheelchair)? 2  -JR     Standing up from a chair using your arms (e.g., wheelchair, bedside chair)? 2  -JR     Climbing 3-5 steps with a railing? 2  -JR     To walk in hospital room? 3  -JR     AM-PAC 6 Clicks Score (PT) 15  -     Row Name 04/06/22 1619          Functional Assessment    Outcome Measure  Options AM-PAC 6 Clicks Daily Activity (OT)  -           User Key  (r) = Recorded By, (t) = Taken By, (c) = Cosigned By    Initials Name Provider Type     Noemí Redding Occupational Therapist    Maribel Miguel, PT Physical Therapist                Occupational Therapy Education                 Title: PT OT SLP Therapies (In Progress)     Topic: Occupational Therapy (In Progress)     Point: ADL training (Done)     Description:   Instruct learner(s) on proper safety adaptation and remediation techniques during self care or transfers.   Instruct in proper use of assistive devices.              Learning Progress Summary           Patient Acceptance, E,TB, VU by  at 4/6/2022 6020    Comment: Role of OT/POC                   Point: Home exercise program (Not Started)     Description:   Instruct learner(s) on appropriate technique for monitoring, assisting and/or progressing therapeutic exercises/activities.              Learner Progress:  Not documented in this visit.          Point: Precautions (Not Started)     Description:   Instruct learner(s) on prescribed precautions during self-care and functional transfers.              Learner Progress:  Not documented in this visit.          Point: Body mechanics (Not Started)     Description:   Instruct learner(s) on proper positioning and spine alignment during self-care, functional mobility activities and/or exercises.              Learner Progress:  Not documented in this visit.                      User Key     Initials Effective Dates Name Provider Type Discipline     06/16/21 -  Noemí Redding Occupational Therapist OT              OT Recommendation and Plan  Planned Therapy Interventions (OT): activity tolerance training, BADL retraining, patient/caregiver education/training, transfer/mobility retraining, strengthening exercise  Therapy Frequency (OT): 3 times/wk  Plan of Care Review  Plan of Care Reviewed With: patient  Progress: no change  Outcome Evaluation:  Pt seen for OT evaluation today.  Pt presents with decreased strength, endurance and independence with ADL tasks.  Pt min assist to sit eob, min assist to stand and min assist to walk 1' to head of bed.  Pt is expected to benefit from skilled OT to improve her strength and independence with ADL tasks.     Time Calculation:    Time Calculation- OT     Row Name 04/06/22 1620 04/06/22 1315          Time Calculation- OT    OT Start Time 1113  -AH --     OT Received On 04/06/22  - --     OT Goal Re-Cert Due Date 04/16/22  - --            Timed Charges    84342 - Gait Training Minutes  -- 10  -JR            Untimed Charges    OT Eval/Re-eval Minutes 40  -AH --            Total Minutes    Timed Charges Total Minutes -- 10  -JR     Untimed Charges Total Minutes 40  -AH --      Total Minutes 40  -AH 10  -JR           User Key  (r) = Recorded By, (t) = Taken By, (c) = Cosigned By    Initials Name Provider Type     Noemí Redding Occupational Therapist     Maribel Dorman, PT Physical Therapist              Therapy Charges for Today     Code Description Service Date Service Provider Modifiers Qty    [id number]  OT EVAL MOD COMPLEXITY 3 4/6/2022 Noemí Redding GO 1               Noemí Redding  4/6/2022

## 2022-04-06 NOTE — CASE MANAGEMENT/SOCIAL WORK
Continued Stay Note  SRINI Hdz     Patient Name: Millicent Mckeon  MRN: [id number]  Today's Date: 4/6/2022    Admit Date: 3/30/2022     Discharge Plan     Row Name 04/06/22 1046       Plan    Plan Comments [place] [facility]. Awaiting chair time.               Discharge Codes    No documentation.                     HOLLY Waite

## 2022-04-06 NOTE — PROGRESS NOTES
[facility] Progress Note  [facility]. [place]        Patient Name: Millicent Mckeon  : 1960  MRN: [id number]   LOS: 5 days    Patient Care Team:  Marianela Albright APRN as PCP - General (Family Medicine)  Geremias Shepherd MD as Consulting Physician (General Surgery)  Lisa Cardoso MD as Surgeon (General Surgery)    Chief Complaint:    Chief Complaint   Patient presents with   • Weakness - Generalized     Unable to stand     Primary Care Physician:  Marianela Albright APRN  Date of admission: 3/30/2022    Subjective     Interval History:   Events noted from last 24 hours.  Patient is lot more awake alert and interactive. She denies having any chest pain or shortness of breath.  No fever.  Val said that she has a sister, her mother, patient's brother and she herself can help to the peritoneal dialysis and they will be happy to learn.  Yesterday when Dr. Shrestha saw the patient and was looking at her belly he was not comfortable because of some wounds and after discussion with the POA it was decided not to get a PD catheter placed.    Review of Systems:   As noted above    Objective     Vitals:   Temp:  [93.2 °F (34 °C)-98 °F (36.7 °C)] 97 °F (36.1 °C)  Heart Rate:  [65-88] 86  Resp:  [13-19] 16  BP: (100-152)/(51-78) 140/66    Intake/Output Summary (Last 24 hours) at 2022 1011  Last data filed at 2022 0441  Gross per 24 hour   Intake 50 ml   Output 2475 ml   Net -2425 ml       Physical Exam:    General Appearance: alert,  no acute distress   Skin: warm and dry  HEENT: oral mucosa normal, nonicteric sclera  Neck: supple, no JVD  Lungs: CTA  Heart: RRR, normal S1 and S2  Abdomen: Obese, soft to firm, nontender, nondistended, she does have dependent edema mostly on the back of her body including sacral area.  : no palpable bladder  Extremities: 1 + edema, mostly it is dependent edema, no cyanosis or clubbing  Neuro: Awake and interactive, randomly move  extremities.    Scheduled Meds:     Current Facility-Administered Medications   Medication Dose Route Frequency Provider Last Rate Last Admin   • acetaminophen (TYLENOL) tablet 650 mg  650 mg Oral Q4H PRN Cheyanne Valencia DO   650 mg at 03/31/22 0629    Or   • acetaminophen (TYLENOL) 160 MG/5ML solution 650 mg  650 mg Oral Q4H PRN Cheyanne Valencia DO        Or   • acetaminophen (TYLENOL) suppository 650 mg  650 mg Rectal Q4H PRN Cheyanne Valencia DO       • albumin human 25 % IV SOLN 12.5 g  12.5 g Intravenous PRN Cheyanne Valencia DO       • amoxicillin-clavulanate (AUGMENTIN) 500-125 MG per tablet 500 mg  1 tablet Oral Q12H Cheyanne Valencia DO   500 mg at 04/06/22 0826   • aspirin chewable tablet 81 mg  81 mg Oral Daily Cheyanne Valencia DO   81 mg at 04/06/22 0827   • b complex-vitamin c-folic acid (NEPHRO-LOIS) tablet 1 tablet  1 tablet Oral Daily Cheyanne Valencia DO   1 tablet at 04/06/22 0827   • dextrose (D50W) (25 g/50 mL) IV injection 25 g  25 g Intravenous Q15 Min PRN Cheyanne Valencia DO       • dextrose (D50W) (25 g/50 mL) IV injection 50 mL  50 mL Intravenous Q1H PRN Cheyanne Valencia DO   50 mL at 03/31/22 0630   • dextrose (GLUTOSE) oral gel 1 tube  1 tube Oral Q15 Min PRN Cheyanne Valencia DO       • docusate sodium (COLACE) capsule 100 mg  100 mg Oral BID Cheyanne Valencia DO   100 mg at 04/06/22 0827   • ferrous sulfate EC tablet 324 mg  324 mg Oral BID With Meals Cheyanne Valencia DO   324 mg at 04/06/22 0827   • glucagon (human recombinant) (GLUCAGEN DIAGNOSTIC) injection 1 mg  1 mg Subcutaneous PRN Cheyanne Valencia DO       • heparin (porcine) injection 1,000 Units  1,000 Units Intracatheter PRN Cheyanne Valencia DO       • insulin aspart (novoLOG) injection 0-14 Units  0-14 Units Subcutaneous TID AC Fabiorick, Cheyanne Syed, DO   3 Units at 04/05/22 0634   • insulin detemir (LEVEMIR) injection 10 Units  10  Units Subcutaneous Q12H Cheyanne Valencia, DO       • ipratropium-albuterol (DUO-NEB) nebulizer solution 3 mL  3 mL Nebulization Q4H PRN Cheyanne Valencia DO       • isosorbide mononitrate (IMDUR) 24 hr tablet 30 mg  30 mg Oral Daily Cheyanne Valencia, DO   30 mg at 04/04/22 1005   • ketoconazole (NIZORAL) 2 % cream 1 application  1 application Topical BID Cheyanne Valencia, DO   1 application at 04/06/22 0832   • lactobacillus acidophilus (RISAQUAD) capsule 1 capsule  1 capsule Oral BID Cheyanne Valencia DO   1 capsule at 04/06/22 0827   • levothyroxine (SYNTHROID, LEVOTHROID) tablet 150 mcg  150 mcg Oral Daily Cheyanne Valencia, DO   150 mcg at 04/06/22 0827   • lidocaine (LIDODERM) 5 % 1 patch  1 patch Transdermal Q24H Cheyanne Valencia DO   1 patch at 04/05/22 2040   • metoprolol succinate XL (TOPROL-XL) 24 hr tablet 100 mg  100 mg Oral Q24H Cheyanne Valencia, DO   100 mg at 04/04/22 1005   • mupirocin (BACTROBAN) 2 % cream 1 application  1 application Topical TID Cheyanne Valencia, DO   1 application at 04/06/22 0832   • ondansetron (ZOFRAN) injection 4 mg  4 mg Intravenous Q6H PRN Cheyanne Valencia DO       • pantoprazole (PROTONIX) EC tablet 40 mg  40 mg Oral Daily Cheyanne Valencia, DO   40 mg at 04/06/22 0827   • silver sulfadiazine (SILVADENE, SSD) 1 % cream 1 application  1 application Topical Q12H Cheyanne Valencia, DO   1 application at 04/06/22 0832   • sodium chloride 0.9 % bolus 1,000 mL  1,000 mL Intravenous PRN Cheyanne Valencia, DO       • sodium chloride 0.9 % flush 10 mL  10 mL Intravenous PRN Cheyanne Valencia, DO       • sodium chloride 0.9 % flush 10 mL  10 mL Intravenous Q12H Cheyanne Valencia, DO   10 mL at 04/05/22 2042   • sodium chloride 0.9 % flush 10 mL  10 mL Intravenous Q12H Cheyanne Valencia,    10 mL at 04/05/22 2041   • sodium chloride 0.9 % flush 10 mL  10 mL Intravenous Q12H Cheyanne Valencia  Syed, DO   10 mL at 04/06/22 0829   • sodium chloride 0.9 % flush 10 mL  10 mL Intravenous PRN Annie, Cheyanne Alatorreus, DO       • sodium chloride 0.9 % flush 20 mL  20 mL Intravenous PRN Annie, Cheyanne Alatorreus, DO       • sodium chloride 0.9 % flush 3 mL  3 mL Intravenous Q12H Annie, Cheyanne Lynch, DO   3 mL at 04/05/22 2042   • sodium chloride 0.9 % flush 3-10 mL  3-10 mL Intravenous PRN Annie, Cheyanne Alatorreus, DO           amoxicillin-clavulanate, 1 tablet, Oral, Q12H  aspirin, 81 mg, Oral, Daily  b complex-vitamin c-folic acid, 1 tablet, Oral, Daily  docusate sodium, 100 mg, Oral, BID  ferrous sulfate, 324 mg, Oral, BID With Meals  insulin aspart, 0-14 Units, Subcutaneous, TID AC  insulin detemir, 10 Units, Subcutaneous, Q12H  isosorbide mononitrate, 30 mg, Oral, Daily  ketoconazole, 1 application, Topical, BID  lactobacillus acidophilus, 1 capsule, Oral, BID  levothyroxine, 150 mcg, Oral, Daily  lidocaine, 1 patch, Transdermal, Q24H  metoprolol succinate XL, 100 mg, Oral, Q24H  mupirocin, 1 application, Topical, TID  pantoprazole, 40 mg, Oral, Daily  silver sulfadiazine, 1 application, Topical, Q12H  sodium chloride, 10 mL, Intravenous, Q12H  sodium chloride, 10 mL, Intravenous, Q12H  sodium chloride, 10 mL, Intravenous, Q12H  sodium chloride, 3 mL, Intravenous, Q12H        IV Meds:        Results Reviewed:   I have personally reviewed the results from the time of this admission to 4/6/2022 10:11 EDT     Results from last 7 days   Lab Units 04/06/22  0751 04/05/22  0537 04/04/22  0955 04/03/22  0604 03/31/22  0541 03/30/22  1534   SODIUM mmol/L 137 140 137 141   < > 142   POTASSIUM mmol/L 3.6 3.7 4.1 4.5   < > 4.5   CHLORIDE mmol/L 98 98 95* 97*   < > 100   CO2 mmol/L 22.4 22.5 23.2 21.2*   < > 24.2   BUN mg/dL 64* 104* 97* 101*   < > 75*   CREATININE mg/dL 3.81* 5.01* 5.03* 4.69*   < > 3.74*   CALCIUM mg/dL 8.2* 7.9* 7.6* 7.9*   < > 8.0*   BILIRUBIN mg/dL  --   --  0.6 0.7  --  0.7   ALK PHOS U/L  --   --   225* 250*  --  261*   ALT (SGPT) U/L  --   --  81* 73*  --  17   AST (SGOT) U/L  --   --  61* 77*  --  22   GLUCOSE mg/dL 142* 191* 428* 251*   < > 40*    < > = values in this interval not displayed.       Estimated Creatinine Clearance: 22.2 mL/min (A) (by C-G formula based on SCr of 3.81 mg/dL (H)).    Results from last 7 days   Lab Units 04/06/22  0751 04/05/22  0537 04/02/22  0453 04/01/22  0421 03/30/22  1534   MAGNESIUM mg/dL  --   --   --   --  2.5*   PHOSPHORUS mg/dL 4.5 5.3* 6.6*   < >  --     < > = values in this interval not displayed.             Results from last 7 days   Lab Units 04/06/22  0751 04/05/22 0537 04/04/22  0955 04/03/22  0604 04/02/22  0453   WBC 10*3/mm3 4.88 5.14 6.04 6.00 6.23   HEMOGLOBIN g/dL 9.8* 9.9* 10.0* 9.7* 10.2*   PLATELETS 10*3/mm3 55* 60* 69* 77* 88*       Results from last 7 days   Lab Units 04/03/22  0604 04/02/22  0453 03/31/22  1343   INR  1.84* 3.30* 2.54*       Brief Urine Lab Results  (Last result in the past 365 days)      Color   Clarity   Blood   Leuk Est   Nitrite   Protein   CREAT   Urine HCG        03/30/22 1705 Yellow   Clear   Trace   Negative   Negative   >=300 mg/dL (3+)                 No results found for: UTPCR    Imaging Results (Last 24 Hours)     Procedure Component Value Units Date/Time    XR Chest 1 View [[id number]] Collected: 04/05/22 1653     Updated: 04/05/22 1654    Narrative:      FINAL REPORT    CLINICAL HISTORY:  line    FINDINGS:  SINGLE VIEW CHEST  There is cardiomegaly and pulmonary vascular  congestion.  The mediastinum is unremarkable.  Right-sided deep  line terminates at the cavoatrial junction.  Left sided deep  line terminates in the lower SVC.  There are mild pulmonary  opacities, favor edema.  There is no pneumothorax.      Impression:      Deep lines as detailed above.  Pulmonary opacities, favor edema.    Authenticated by Ashok Benito III, MD on 04/05/2022  04:53:45 PM    FL C Arm During Surgery [[id number]] Resulted: 04/05/22  1453     Updated: 04/05/22 1453    Narrative:      This procedure was auto-finalized with no dictation required.              Assessment / Plan     ASSESSMENT:    End-stage renal disease: After denying to have dialysis finally she has agreed, because of multiple issues with the belly peritoneal dialysis catheter was not an option at this point, she ended up with a tunneled dialysis catheter.  We will make arrangements for her outpatient dialysis, once she is better we will have further discussion with the family if they still are interested and wants to do her home peritoneal dialysis.    Anemia: Hemoglobin appears to be fairly stable, she does have iron deficiency and because of acute infection cannot give any IV iron continue with oral iron and will start BI as an outpatient.    Chronic diastolic congestive heart failure (HCC): We will try to diurese with high-dose diuretics and see if she will respond to it.  Volume status significantly better since on dialysis will take another 2 to 3 L until she gets to euvolemia.    Cor pulmonale, chronic (HCC): Longstanding history of untreated sleep apnea might be contributing.    Hypoglycemia: Oral hypoglycemic agents with prolonged effect from renal failure likely the cause.    Bilateral edema of lower extremity: She does not have much lower extremity edema most of the edema is in the abdominal and chest area appears to be having generalized anasarca.    Essential hypertension: Blood pressure is on the low side we will hold off all blood pressure medications we will go ahead and start her on midodrine to maintain blood pressure while aggressively diuresing her.  I will go ahead and stop the hydralazine today and optimize her volume status and continue with the metoprolol.    Acquired hypothyroidism: Continue home Synthroid dose.    Type 2 diabetes mellitus with nephropathy (HCC): As per hospitalist service.    Cellulitis of both lower extremities: IV antibiotics have been  started.    Bradycardia: Currently bradycardic will hold beta-blockers for now.      PLAN:  We will go ahead and do another dialysis today.  Outpatient dialysis placement will be initiated.  Details were discussed with the patient as well as her POA Val.     Details were also discussed with the hospitalist service as well as nursing staff.  Continue with rest of the current treatment plan, and monitor with surveillance labs.  Further recommendations will depend on clinical course of the patient during the current hospitalization.     Thank you for involving us in the care of Millicent Mckeon.  Please feel free to call with any questions.    Milad Leone MD  04/06/22  10:11 EDT     11:48 AM I had at length discussion with the POA Val Cornell as well as Dr. Shrestha, the POA said that she will be able to help and there are multiple other family members who are willing to do her peritoneal dialysis at home.  We have made them aware that there is a training process and we will have to start her on a hemodialysis and once she is set up for hemodialysis, we will quickly make arrangements for her to be switched to peritoneal dialysis.  I will also ask Dr. Shrestha to see if he can place the peritoneal dialysis catheter as well as the tunneled dialysis catheter tomorrow.  He said he is not sure if he can do the peritoneal dialysis catheter but for sure get the tunneled dialysis catheter placed.  Her POA is going to come and talk to her and try to see if she will understand otherwise she will honor her wishes and probably make her comfortable with palliative and hospice at home.  We will have to wait till tomorrow to make the final decision as family discussions are ongoing.    [facility]  [phone]      Much of this encounter note is an electronic transcription/translation of spoken language to printed text. The electronic translation of spoken language may permit erroneous, or at times,  nonsensical words or phrases to be inadvertently transcribed; Although I have reviewed the note for such errors, some may still exist.

## 2022-04-06 NOTE — PROGRESS NOTES
[facility]    Name:  Millicent Mckeon   Age:  61 y.o.  Sex:  female  :  1960  MRN:  [id number]   Visit Number:  [id number]  Admission Date:  3/30/2022  Date Of Service:  22  Primary Care Physician:  Marianela Albright APRN     LOS: 5 days :    Chief Complaint:      Follow-up weakness, renal failure    Subjective:    Patient seen again this morning.  No acute changes overnight.  She had a tunneled dialysis catheter placed and received dialysis yesterday evening.  She was unable to have peritoneal dialysis catheter placed due to ongoing wounds of the abdomen per general surgery.    Hospital Course:    The patient is a 61-year-old chronically ill-appearing female with history of atrial fibrillation, chronic systolic congestive heart failure, CKD stage IV, chronic cor pulmonale, morbid obesity, functional quadriplegia who had presented to the emergency room with generalized weakness.  She had apparently been found at home disheveled and multiple skin sores and wounds.  She had had multiple treatments lately for cellulitis of the lower extremity and hypoglycemia.  Apparently lives at home per family report.    Initial work-up was demonstrating bradycardia, hypoglycemia, with creatinine 3.74 and a BUN of 75.  Hemoglobin 10.8.  Chest x-ray show cardiomegaly and bilateral opacity in the lower zones.  Concern for pneumonia and she was given azithromycin and Rocephin.  She did develop some low blood pressures and became hypothermic upon admission.  She was started on midodrine and IV Lasix and had Wilkinson catheter placed.  There was concern for sepsis due to her recent hospitalization she was started on Zosyn and vancomycin.  She had a left internal jugular central venous line placed on 3/31/2022.  She subsequent had improvement in her blood pressures.  Her MRSA swab was negative, vancomycin was discontinued.  She also had some improvement in her urine output.  She was  noted to have overall worsening kidney function, discussions had about dialysis.  Patient was seen by palliative care services.  She did elect to be a DNR/DNI.  After discussion with patient's niece, patient is now in agreement with trial of dialysis and will plan on having a tunneled dialysis catheter placed.  This was placed on 4/5/2022 and she started hemodialysis.    Review of Systems:     All systems were reviewed and negative except as mentioned in subjective, assessment and plan.    Vital Signs:    Temp:  [93.2 °F (34 °C)-98 °F (36.7 °C)] 94.5 °F (34.7 °C)  Heart Rate:  [65-88] 82  Resp:  [13-19] 16  BP: (100-152)/(51-78) 152/78    Intake and output:    I/O last 3 completed shifts:  In: 410 [P.O.:360; I.V.:50]  Out: 1225 [Urine:1225]  No intake/output data recorded.    Physical Examination:    General Appearance:  Alert and cooperative.  No acute distress.  Chronically ill-appearing   Head:  Atraumatic and normocephalic.   Eyes: Conjunctivae and sclerae normal, no icterus. No pallor.   Throat: No oral lesions, no thrush, oral mucosa moist.   Neck: Supple, trachea midline, no thyromegaly.   Lungs:    Breath sounds diminished.   Heart:  Normal S1 and S2, no murmur, no gallop, no rub. No JVD.   Abdomen:   Normal bowel sounds, no masses, no organomegaly. Soft, nontender, nondistended, no rebound tenderness.  Obese, Wilkinson catheter noted   Extremities: Supple, 3+ lower edema unchanged, no cyanosis, no clubbing.  Venous insufficiency noted.   Skin:  Patient has multiple areas of erythema of the lower extremities, scabs, ulcerations of the skin.   Neurologic: Alert and oriented x 3. No facial asymmetry. Moves all four limbs. No tremors.      Laboratory results:    Results from last 7 days   Lab Units 04/05/22  0537 04/04/22  0955 04/03/22  0604 03/31/22  0541 03/30/22  1534   SODIUM mmol/L 140 137 141   < > 142   POTASSIUM mmol/L 3.7 4.1 4.5   < > 4.5   CHLORIDE mmol/L 98 95* 97*   < > 100   CO2 mmol/L 22.5 23.2  21.2*   < > 24.2   BUN mg/dL 104* 97* 101*   < > 75*   CREATININE mg/dL 5.01* 5.03* 4.69*   < > 3.74*   CALCIUM mg/dL 7.9* 7.6* 7.9*   < > 8.0*   BILIRUBIN mg/dL  --  0.6 0.7  --  0.7   ALK PHOS U/L  --  225* 250*  --  261*   ALT (SGPT) U/L  --  81* 73*  --  17   AST (SGOT) U/L  --  61* 77*  --  22   GLUCOSE mg/dL 191* 428* 251*   < > 40*    < > = values in this interval not displayed.     Results from last 7 days   Lab Units 04/05/22  0537 04/04/22  0955 04/03/22  0604   WBC 10*3/mm3 5.14 6.04 6.00   HEMOGLOBIN g/dL 9.9* 10.0* 9.7*   HEMATOCRIT % 32.0* 32.2* 31.8*   PLATELETS 10*3/mm3 60* 69* 77*     Results from last 7 days   Lab Units 04/03/22  0604 04/02/22  0453 03/31/22  1343   INR  1.84* 3.30* 2.54*     Results from last 7 days   Lab Units 03/30/22  1534   TROPONIN T ng/mL 0.025     Results from last 7 days   Lab Units 03/30/22  1716   BLOODCX  No growth at 5 days  No growth at 5 days         I have reviewed the patient's laboratory results.    Radiology results:    XR Chest 1 View    Result Date: 4/5/2022  FINAL REPORT CLINICAL HISTORY: line FINDINGS: SINGLE VIEW CHEST  There is cardiomegaly and pulmonary vascular congestion.  The mediastinum is unremarkable.  Right-sided deep line terminates at the cavoatrial junction.  Left sided deep line terminates in the lower SVC.  There are mild pulmonary opacities, favor edema.  There is no pneumothorax.     Impression: Deep lines as detailed above.  Pulmonary opacities, favor edema. Authenticated by Ashok Benito III, MD on 04/05/2022 04:53:45 PM    FL C Arm During Surgery    Result Date: 4/5/2022  This procedure was auto-finalized with no dictation required.    I have reviewed the patient's radiology reports.    Medication Review:     I have reviewed the patient's active and prn medications.     Problem List:      Hypoglycemia    Bilateral edema of lower extremity    Essential hypertension    Acquired hypothyroidism    Type 2 diabetes mellitus with nephropathy  (HCC)    Cellulitis of both lower extremities    Anemia due to stage 4 chronic kidney disease (HCC)    Chronic diastolic congestive heart failure (HCC)    Cor pulmonale, chronic (HCC)    Chronic kidney disease, stage IV (severe) (HCC)    Bradycardia      Assessment:    1. Sepsis with generalized weakness, hypothermia and hypotension secondary to #2,POA, resolved.  2. Bilateral lower extremity cellulitis, POA, improving.  3. Acute hypoglycemia, POA, resolved.  4. Severe bradycardia possibly related to medications in the setting of worsening renal failure, resolved.  5. Progressive worsening of chronic kidney disease stage IV with volume overload, POA, improving.  6. Chronic venous stasis of the lower extremities with multiple skin ulcers, POA..  7. Chronic systolic heart failure with ejection fraction of 35%.  8. Paroxysmal atrial fibrillation on apixaban.  9. Chronic cor pulmonale.  10. Chronic hypoxic respiratory failure on home oxygen.  11. Diabetes mellitus type 2 with nephropathy.  12. Morbid obesity with a BMI of 57.  13. Anemia of chronic kidney disease.  14. Functional quadriplegia.  15. Acquired hypothyroidism.  16. Sacral deep tissue injury present on admission.      Plan:    Sepsis/hypothermia/hypotension.  Patient initially was on Zosyn for 4 days, switched to Augmentin yesterday for lower extremity cellulitis and possible panniculitis. Continue with local wound care.     Generalized weakness/hypoglycemia/bradycardia.    Likely multifactorial in setting of severe multiple comorbidities, functional quadriplegia, worsening renal and congestive heart failure noted.  Her Toprol-XL was restarted.  Her amiodarone is on hold.     Progressive worsening of chronic kidney disease stage IV.  Hemodialysis initiated after tunneled dialysis catheter placed yesterday.  Will need to set up outpatient hemodialysis.  POA is going to help her.  If patient elects not to proceed with hemodialysis moving forward,  recommendation will be for hospice care.     Diabetes mellitus type 2.  A1c of 8.5.  She continues on Levemir 20 units twice a day with aggressive SSI protocol.    We will see how patient does with initiation of hemodialysis.     Of note, will need to monitor hemoglobin and platelets.  She is currently on aspirin, Eliquis has been on hold.  No bleeding noted.    DVT Prophylaxis: Eliquis is on hold.  Code Status: DNR/DNI  Diet: Renal  Discharge Plan: ADI Valencia DO  04/06/22  07:39 EDT    Dictated utilizing Dragon dictation.

## 2022-04-06 NOTE — THERAPY TREATMENT NOTE
Patient Name: Millicent Mckeon  : 1960    MRN: [id number]                              Today's Date: 2022       Admit Date: 3/30/2022    Visit Dx:     ICD-10-CM ICD-9-CM   1. Hypoglycemia  E16.2 251.2   2. Bradycardia  R00.1 427.89   3. Failure to thrive in adult  R62.7 783.7   4. Pneumonia of right lung due to infectious organism, unspecified part of lung  J18.9 483.8     Patient Active Problem List   Diagnosis   • Altered mental status   • Bilateral edema of lower extremity   • Cellulitis of lower extremity   • Acute on chronic renal failure (HCC)   • GERD (gastroesophageal reflux disease)   • Hyperkalemia   • Essential hypertension   • Acquired hypothyroidism   • Type 2 diabetes mellitus with nephropathy ([facility])   • Vitamin B12 deficiency   • Cellulitis of both lower extremities   • Anemia due to stage 4 chronic kidney disease ([facility])   • Acute renal failure (ARF) ([facility])   • Hyperkalemia   • Impaired functional mobility and activity tolerance   • Chronic diastolic congestive heart failure ([facility])   • Paroxysmal atrial fibrillation with rapid ventricular response ([facility])   • Pulmonary hypertension ([facility])   • Cor pulmonale, chronic ([facility])   • Chronic venous hypertension involving both sides   • Lymphedema of both lower extremities   • Obesity, morbid, BMI 50 or higher ([facility])   • A-fib ([facility])   • CKD (chronic kidney disease) stage 3, GFR 30-59 ml/min ([facility])   • Acute bronchitis due to other specified organisms   • Tachycardia-bradycardia syndrome ([facility])   • Pressure ulcer of right heel, unstageable ([facility])   • Mucopurulent chronic bronchitis ([facility])   • Acute pulmonary edema ([facility])   • Thrombocytopenia, unspecified ([facility])   • Chronic kidney disease, stage IV (severe) ([facility])   • Hypoglycemia   • Bradycardia     Past Medical History:   Diagnosis Date   • A-fib ([facility])    • Anemia 10/2/2018   • Diabetes mellitus ([facility])    • Disease of thyroid gland    • GERD (gastroesophageal reflux disease)    • Gout    • History of transfusion     • Hypertension      Past Surgical History:   Procedure Laterality Date   • EYE SURGERY     • INCISION AND DRAINAGE LEG Right 1/14/2019    Procedure: Right heel incision and drainage with graft application;  Surgeon: Ar Rueda DPM;  Location: [facility] OR;  Service: Podiatry   • INSERTION HEMODIALYSIS CATHETER N/A 4/5/2022    Procedure: HEMODIALYSIS CATHETER INSERTION;  Surgeon: Jean Carlos Guadalupe MD;  Location: [facility] OR;  Service: General;  Laterality: N/A;   • LEG SURGERY        General Information     Row Name 04/06/22 1115          Physical Therapy Time and Intention    Document Type therapy note (daily note)  -     Mode of Treatment physical therapy  -     Row Name 04/06/22 1115          General Information    Patient Profile Reviewed yes  -           User Key  (r) = Recorded By, (t) = Taken By, (c) = Cosigned By    Initials Name Provider Type     Maribel Dorman, PT Physical Therapist               Mobility     Row Name 04/06/22 1115          Bed Mobility    Bed Mobility supine-sit;sit-supine  -     Supine-Sit [place] (Bed Mobility) minimum assist (75% patient effort)  -     Sit-Supine [place] (Bed Mobility) minimum assist (75% patient effort)  -     Assistive Device (Bed Mobility) head of bed elevated;bed rails  -JR     Comment, (Bed Mobility) patient sat on the edge of the bed for 5 minutes  -     Row Name 04/06/22 1115          Sit-Stand Transfer    Sit-Stand [place] (Transfers) minimum assist (75% patient effort)  -     Assistive Device (Sit-Stand Transfers) walker, front-wheeled  -     Row Name 04/06/22 1115          Gait/Stairs (Locomotion)    [place] Level (Gait) minimum assist (75% patient effort)  -     Assistive Device (Gait) walker, front-wheeled  -     Distance in Feet (Gait) 1 to the right toward the head of the bed  -     Deviations/Abnormal Patterns (Gait) base of support, wide;stride length decreased;weight shifting decreased  -JR     Bilateral  Gait Deviations forward flexed posture  does not straighten up, hip flexed to about 70 degrees  -JR           User Key  (r) = Recorded By, (t) = Taken By, (c) = Cosigned By    Initials Name Provider Type    Maribel Miguel PT Physical Therapist               Obj/Interventions     Row Name 04/06/22 1115          Balance    Balance Assessment sitting static balance;sitting dynamic balance;sit to stand dynamic balance;standing static balance;standing dynamic balance  -JR     Static Sitting Balance supervision  -JR     Dynamic Sitting Balance supervision  -JR     Position, Sitting Balance sitting edge of bed  -JR     Sit to Stand Dynamic Balance minimal assist  -JR     Static Standing Balance contact guard;minimal assist  -JR     Dynamic Standing Balance minimal assist  -JR     Position/Device Used, Standing Balance walker, rolling  -JR     Comment, Balance patient stands with hip flexed about 70 degrees  -JR           User Key  (r) = Recorded By, (t) = Taken By, (c) = Cosigned By    Initials Name Provider Type    Maribel Miguel PT Physical Therapist               Goals/Plan    No documentation.                Clinical Impression     Row Name 04/06/22 1115          Pain    Pretreatment Pain Rating 0/10 - no pain  -JR     Posttreatment Pain Rating 0/10 - no pain  -JR     Row Name 04/06/22 1115          Plan of Care Review    Plan of Care Reviewed With patient  -JR     Progress improving  -JR     Outcome Evaluation Patient demonstrates improving strength, balance and activity tolerance as seen by performance of transfers and gait.  She requires minimal assistance for mobility tasks, including walking 1 foot to the right toward the head of the bed.  She reports she is feeling better, but still feels weak.  She is expected to benefit from additional PT services per POC.  -JR     Row Name 04/06/22 1115          Positioning and Restraints    Pre-Treatment Position in bed  -JR     Post Treatment Position bed  -JR     In Bed  sitting;call light within reach;encouraged to call for assist;exit alarm on;notified nsg  bed in chair position  -           User Key  (r) = Recorded By, (t) = Taken By, (c) = Cosigned By    Initials Name Provider Type    Maribel Miguel PT Physical Therapist               Outcome Measures     Row Name 04/06/22 1115          How much help from another person do you currently need...    Turning from your back to your side while in flat bed without using bedrails? 3  -JR     Moving from lying on back to sitting on the side of a flat bed without bedrails? 3  -JR     Moving to and from a bed to a chair (including a wheelchair)? 2  -JR     Standing up from a chair using your arms (e.g., wheelchair, bedside chair)? 2  -JR     Climbing 3-5 steps with a railing? 2  -JR     To walk in hospital room? 3  -JR     AM-PAC 6 Clicks Score (PT) 15  -           User Key  (r) = Recorded By, (t) = Taken By, (c) = Cosigned By    Initials Name Provider Type    Maribel Miguel PT Physical Therapist                             Physical Therapy Education                 Title: PT OT SLP Therapies (In Progress)     Topic: Physical Therapy (In Progress)     Point: Mobility training (Done)     Learning Progress Summary           Patient Acceptance, E,D, DU,NR by  at 4/3/2022 1011    Comment: Pt education for improving bed mobility technique as well as LE ther ex technique.                   Point: Home exercise program (Done)     Learning Progress Summary           Patient Acceptance, E,D, DU,NR by  at 4/3/2022 1011    Comment: Pt education for improving bed mobility technique as well as LE ther ex technique.                   Point: Body mechanics (Done)     Learning Progress Summary           Patient Acceptance, E,TB, VU by  at 4/6/2022 1115    Comment: Encouraged upright standing, as she stands with roughly 70 degrees of hip flexion.                   Point: Precautions (Not Started)     Learner Progress:  Not documented in this  visit.                      User Key     Initials Effective Dates Name Provider Type Discipline    JR 03/23/22 -  Maribel Dorman, PT Physical Therapist PT    TW 06/16/21 -  Xochitl Bradford PT Physical Therapist PT              PT Recommendation and Plan     Plan of Care Reviewed With: patient  Progress: improving  Outcome Evaluation: Patient demonstrates improving strength, balance and activity tolerance as seen by performance of transfers and gait.  She requires minimal assistance for mobility tasks, including walking 1 foot to the right toward the head of the bed.  She reports she is feeling better, but still feels weak.  She is expected to benefit from additional PT services per POC.     Time Calculation:    PT Charges     Row Name 04/06/22 1315             Time Calculation    Start Time 1115  -JR      Stop Time 1150  -JR      Time Calculation (min) 35 min  -JR      PT Received On 04/06/22  -      PT Goal Re-Cert Due Date 04/13/22  -              Timed Charges    98434 - Gait Training Minutes  10  -JR      51978 - PT Therapeutic Activity Minutes 25  -JR              Total Minutes    Timed Charges Total Minutes 35  -JR       Total Minutes 35  -JR            User Key  (r) = Recorded By, (t) = Taken By, (c) = Cosigned By    Initials Name Provider Type    JR Maribel Dorman, PT Physical Therapist              Therapy Charges for Today     Code Description Service Date Service Provider Modifiers Qty    94861801709 HC GAIT TRAINING EA 15 MIN 4/6/2022 Maribel Dorman, PT GP 1    93102069311 HC PT THERAPEUTIC ACT EA 15 MIN 4/6/2022 Maribel Dorman, PT GP 1          PT G-Codes  Outcome Measure Options: AM-PAC 6 Clicks Basic Mobility (PT)  AM-PAC 6 Clicks Score (PT): 15    Maribel Dorman PT  4/6/2022

## 2022-04-06 NOTE — PLAN OF CARE
Goal Outcome Evaluation:  Plan of Care Reviewed With: patient        Progress: no change  Outcome Evaluation: Pt seen for OT evaluation today.  Pt presents with decreased strength, endurance and independence with ADL tasks.  Pt min assist to sit eob, min assist to stand and min assist to walk 1' to head of bed.  Pt is expected to benefit from skilled OT to improve her strength and independence with ADL tasks.

## 2022-04-06 NOTE — PLAN OF CARE
Goal Outcome Evaluation:           Progress: no change  Outcome Evaluation: Patient had dialysis today. Tolerated well. Denies pain. Morning ECG strip looked like patient was in 2nd degree AVB, Mobitz I. 12 lead ECG showed A Flutter. Afternoon strip showed A Flutter.

## 2022-04-07 LAB
ANION GAP SERPL CALCULATED.3IONS-SCNC: 15.2 MMOL/L (ref 5–15)
BUN SERPL-MCNC: 45 MG/DL (ref 8–23)
BUN/CREAT SERPL: 14.9 (ref 7–25)
CALCIUM SPEC-SCNC: 8.4 MG/DL (ref 8.6–10.5)
CHLORIDE SERPL-SCNC: 100 MMOL/L (ref 98–107)
CO2 SERPL-SCNC: 19.8 MMOL/L (ref 22–29)
CREAT SERPL-MCNC: 3.02 MG/DL (ref 0.57–1)
DEPRECATED RDW RBC AUTO: 53.9 FL (ref 37–54)
EGFRCR SERPLBLD CKD-EPI 2021: 16.8 ML/MIN/1.73
ERYTHROCYTE [DISTWIDTH] IN BLOOD BY AUTOMATED COUNT: 22.4 % (ref 12.3–15.4)
GLUCOSE BLDC GLUCOMTR-MCNC: 128 MG/DL (ref 70–130)
GLUCOSE BLDC GLUCOMTR-MCNC: 131 MG/DL (ref 70–130)
GLUCOSE BLDC GLUCOMTR-MCNC: 157 MG/DL (ref 70–130)
GLUCOSE BLDC GLUCOMTR-MCNC: 169 MG/DL (ref 70–130)
GLUCOSE SERPL-MCNC: 140 MG/DL (ref 65–99)
HCT VFR BLD AUTO: 32.9 % (ref 34–46.6)
HGB BLD-MCNC: 10.4 G/DL (ref 12–15.9)
MCH RBC QN AUTO: 24 PG (ref 26.6–33)
MCHC RBC AUTO-ENTMCNC: 31.6 G/DL (ref 31.5–35.7)
MCV RBC AUTO: 75.8 FL (ref 79–97)
PLATELET # BLD AUTO: 60 10*3/MM3 (ref 140–450)
PMV BLD AUTO: ABNORMAL FL
POTASSIUM SERPL-SCNC: 3.9 MMOL/L (ref 3.5–5.2)
RBC # BLD AUTO: 4.34 10*6/MM3 (ref 3.77–5.28)
SODIUM SERPL-SCNC: 135 MMOL/L (ref 136–145)
WBC NRBC COR # BLD: 4.82 10*3/MM3 (ref 3.4–10.8)

## 2022-04-07 PROCEDURE — 85027 COMPLETE CBC AUTOMATED: CPT | Performed by: INTERNAL MEDICINE

## 2022-04-07 PROCEDURE — 99232 SBSQ HOSP IP/OBS MODERATE 35: CPT | Performed by: FAMILY MEDICINE

## 2022-04-07 PROCEDURE — 82962 GLUCOSE BLOOD TEST: CPT

## 2022-04-07 PROCEDURE — 80048 BASIC METABOLIC PNL TOTAL CA: CPT | Performed by: FAMILY MEDICINE

## 2022-04-07 PROCEDURE — 87081 CULTURE SCREEN ONLY: CPT | Performed by: FAMILY MEDICINE

## 2022-04-07 PROCEDURE — 63710000001 INSULIN DETEMIR PER 5 UNITS: Performed by: FAMILY MEDICINE

## 2022-04-07 PROCEDURE — 63710000001 INSULIN ASPART PER 5 UNITS: Performed by: FAMILY MEDICINE

## 2022-04-07 RX ORDER — QUETIAPINE FUMARATE 25 MG/1
25 TABLET, FILM COATED ORAL NIGHTLY
Status: DISCONTINUED | OUTPATIENT
Start: 2022-04-07 | End: 2022-04-12

## 2022-04-07 RX ORDER — TORSEMIDE 100 MG/1
100 TABLET ORAL DAILY
Status: DISCONTINUED | OUTPATIENT
Start: 2022-04-07 | End: 2022-04-14 | Stop reason: HOSPADM

## 2022-04-07 RX ORDER — ESCITALOPRAM OXALATE 10 MG/1
10 TABLET ORAL DAILY
Status: DISCONTINUED | OUTPATIENT
Start: 2022-04-07 | End: 2022-04-14 | Stop reason: HOSPADM

## 2022-04-07 RX ADMIN — Medication 3 ML: at 08:49

## 2022-04-07 RX ADMIN — Medication 1 CAPSULE: at 20:15

## 2022-04-07 RX ADMIN — KETOCONAZOLE 1 APPLICATION: 20 CREAM TOPICAL at 08:48

## 2022-04-07 RX ADMIN — SILVER SULFADIAZINE 1 APPLICATION: 10 CREAM TOPICAL at 08:51

## 2022-04-07 RX ADMIN — INSULIN ASPART 2 UNITS: 100 INJECTION, SOLUTION INTRAVENOUS; SUBCUTANEOUS at 12:18

## 2022-04-07 RX ADMIN — LIDOCAINE 1 PATCH: 50 PATCH CUTANEOUS at 20:15

## 2022-04-07 RX ADMIN — Medication 10 ML: at 08:52

## 2022-04-07 RX ADMIN — Medication 10 ML: at 08:51

## 2022-04-07 RX ADMIN — SILVER SULFADIAZINE 1 APPLICATION: 10 CREAM TOPICAL at 20:15

## 2022-04-07 RX ADMIN — Medication 10 ML: at 20:15

## 2022-04-07 RX ADMIN — QUETIAPINE FUMARATE 25 MG: 25 TABLET ORAL at 20:15

## 2022-04-07 RX ADMIN — MUPIROCIN 1 APPLICATION: 2 CREAM TOPICAL at 08:48

## 2022-04-07 RX ADMIN — FERROUS SULFATE TAB EC 324 MG (65 MG FE EQUIVALENT) 324 MG: 324 (65 FE) TABLET DELAYED RESPONSE at 19:00

## 2022-04-07 RX ADMIN — KETOCONAZOLE 1 APPLICATION: 20 CREAM TOPICAL at 20:15

## 2022-04-07 RX ADMIN — MUPIROCIN 1 APPLICATION: 2 CREAM TOPICAL at 17:00

## 2022-04-07 RX ADMIN — MUPIROCIN 1 APPLICATION: 2 CREAM TOPICAL at 20:15

## 2022-04-07 RX ADMIN — INSULIN DETEMIR 10 UNITS: 100 INJECTION, SOLUTION SUBCUTANEOUS at 20:15

## 2022-04-07 RX ADMIN — AMOXICILLIN AND CLAVULANATE POTASSIUM 500 MG: 500; 125 TABLET, FILM COATED ORAL at 20:15

## 2022-04-07 RX ADMIN — DOCUSATE SODIUM 100 MG: 100 CAPSULE, LIQUID FILLED ORAL at 20:15

## 2022-04-07 NOTE — THERAPY TREATMENT NOTE
Pt resting soundly in bed, did not awaken enough to work with therapy. Will re-attempt at later date.

## 2022-04-07 NOTE — PROGRESS NOTES
[facility] Progress Note  [facility]. [place]        Patient Name: Millicent Mckeon  : 1960  MRN: [id number]   LOS: 6 days    Patient Care Team:  Marianela Albright APRN as PCP - General (Family Medicine)  Geremias Shepherd MD as Consulting Physician (General Surgery)  Lisa Cardoso MD as Surgeon (General Surgery)    Chief Complaint:    Chief Complaint   Patient presents with   • Weakness - Generalized     Unable to stand     Primary Care Physician:  Marianela Albright APRN  Date of admission: 3/30/2022    Subjective     Interval History:   Events noted from last 24 hours.  Patient is hiding under the cover today.  She does not want to keep a conversation or talk about anything.  She denies having any chest pain or shortness of breath.  No fever.  Clinically appears to be improving.  Review of Systems:   As noted above    Objective     Vitals:   Temp:  [97.2 °F (36.2 °C)-97.9 °F (36.6 °C)] 97.6 °F (36.4 °C)  Heart Rate:  [] 103  Resp:  [18-19] 19  BP: (126-159)/(62-89) 140/78    Intake/Output Summary (Last 24 hours) at 2022 0846  Last data filed at 2022 0403  Gross per 24 hour   Intake 720 ml   Output 3850 ml   Net -3130 ml       Physical Exam:    General Appearance: alert,  no acute distress   Skin: warm and dry  HEENT: oral mucosa normal, nonicteric sclera  Neck: supple, no JVD  Lungs: CTA  Heart: RRR, normal S1 and S2  Abdomen: Obese, soft to firm, nontender, nondistended, she does have dependent edema mostly on the back of her body including sacral area.  : no palpable bladder  Extremities: 1 + edema, mostly it is dependent edema, no cyanosis or clubbing  Neuro: Awake and interactive, randomly move extremities.    Scheduled Meds:     Current Facility-Administered Medications   Medication Dose Route Frequency Provider Last Rate Last Admin   • acetaminophen (TYLENOL) tablet 650 mg  650 mg Oral Q4H PRN Cheyanne Valencia DO   650 mg at 22 0629     Or   • acetaminophen (TYLENOL) 160 MG/5ML solution 650 mg  650 mg Oral Q4H PRN Cheyanne Valencia DO        Or   • acetaminophen (TYLENOL) suppository 650 mg  650 mg Rectal Q4H PRN Cheyanne Valencia DO       • amoxicillin-clavulanate (AUGMENTIN) 500-125 MG per tablet 500 mg  1 tablet Oral Q12H Cheyanne Valencia DO   500 mg at 04/06/22 2044   • aspirin chewable tablet 81 mg  81 mg Oral Daily Cheyanne Valencia DO   81 mg at 04/06/22 0827   • b complex-vitamin c-folic acid (NEPHRO-LOIS) tablet 1 tablet  1 tablet Oral Daily Cheyanne Valencia DO   1 tablet at 04/06/22 0827   • dextrose (D50W) (25 g/50 mL) IV injection 25 g  25 g Intravenous Q15 Min PRN Cheyanne Valencia DO       • dextrose (D50W) (25 g/50 mL) IV injection 50 mL  50 mL Intravenous Q1H PRN Cheyanne Valencia DO   50 mL at 03/31/22 0630   • dextrose (GLUTOSE) oral gel 1 tube  1 tube Oral Q15 Min PRN Cheyanne Valencia DO       • docusate sodium (COLACE) capsule 100 mg  100 mg Oral BID Cheyanne Valencia DO   100 mg at 04/06/22 2044   • ferrous sulfate EC tablet 324 mg  324 mg Oral BID With Meals Cheyanne Valencia DO   324 mg at 04/06/22 1727   • glucagon (human recombinant) (GLUCAGEN DIAGNOSTIC) injection 1 mg  1 mg Subcutaneous PRN Cheyanne Valencia DO       • heparin (porcine) injection 1,000 Units  1,000 Units Intracatheter PRN Cheyanne Valencia DO       • insulin aspart (novoLOG) injection 0-14 Units  0-14 Units Subcutaneous TID AC Cheyanne Valencia DO   8 Units at 04/06/22 1139   • insulin detemir (LEVEMIR) injection 10 Units  10 Units Subcutaneous Q12H Cheyanne Valencia DO   10 Units at 04/06/22 2046   • ipratropium-albuterol (DUO-NEB) nebulizer solution 3 mL  3 mL Nebulization Q4H PRN Cheyanne Valencia DO       • isosorbide mononitrate (IMDUR) 24 hr tablet 30 mg  30 mg Oral Daily Cheyanne Valencia DO   30 mg at 04/06/22 1602   • ketoconazole (NIZORAL) 2 %  cream 1 application  1 application Topical BID Cheyanne Valencia, DO   1 application at 04/06/22 2046   • lactobacillus acidophilus (RISAQUAD) capsule 1 capsule  1 capsule Oral BID Cheyanne Valencia, DO   1 capsule at 04/06/22 2044   • levothyroxine (SYNTHROID, LEVOTHROID) tablet 150 mcg  150 mcg Oral Daily Cheyanne Valencia, DO   150 mcg at 04/06/22 0827   • lidocaine (LIDODERM) 5 % 1 patch  1 patch Transdermal Q24H Cheyanne Valencia, DO   1 patch at 04/06/22 2044   • metoprolol succinate XL (TOPROL-XL) 24 hr tablet 100 mg  100 mg Oral Q24H Cheyanne Valencia, DO   100 mg at 04/06/22 1602   • mupirocin (BACTROBAN) 2 % cream 1 application  1 application Topical TID Cheyanne Valencia, DO   1 application at 04/06/22 2046   • ondansetron (ZOFRAN) injection 4 mg  4 mg Intravenous Q6H PRN Cheyanne Valencia, DO       • pantoprazole (PROTONIX) EC tablet 40 mg  40 mg Oral Daily Cheyanne Valencia, DO   40 mg at 04/06/22 0827   • silver sulfadiazine (SILVADENE, SSD) 1 % cream 1 application  1 application Topical Q12H Cheyanne Valencia, DO   1 application at 04/06/22 2046   • sodium chloride 0.9 % flush 10 mL  10 mL Intravenous PRN Cheyanne Valencia, DO       • sodium chloride 0.9 % flush 10 mL  10 mL Intravenous Q12H Cheyanne Valencia, DO   10 mL at 04/06/22 2045   • sodium chloride 0.9 % flush 10 mL  10 mL Intravenous Q12H Cheyanne Valenciaus, DO   10 mL at 04/06/22 2045   • sodium chloride 0.9 % flush 10 mL  10 mL Intravenous Q12H Cheyanne Valenciaus, DO   10 mL at 04/06/22 2045   • sodium chloride 0.9 % flush 10 mL  10 mL Intravenous PRN Cheyanne Valencia, DO       • sodium chloride 0.9 % flush 20 mL  20 mL Intravenous PRN Cheyanne Valencia, DO       • sodium chloride 0.9 % flush 3 mL  3 mL Intravenous Q12H Cheyanne Valencia DO   3 mL at 04/06/22 2045   • sodium chloride 0.9 % flush 3-10 mL  3-10 mL Intravenous PRN Cheyanne Valencia,             amoxicillin-clavulanate, 1 tablet, Oral, Q12H  aspirin, 81 mg, Oral, Daily  b complex-vitamin c-folic acid, 1 tablet, Oral, Daily  docusate sodium, 100 mg, Oral, BID  ferrous sulfate, 324 mg, Oral, BID With Meals  insulin aspart, 0-14 Units, Subcutaneous, TID AC  insulin detemir, 10 Units, Subcutaneous, Q12H  isosorbide mononitrate, 30 mg, Oral, Daily  ketoconazole, 1 application, Topical, BID  lactobacillus acidophilus, 1 capsule, Oral, BID  levothyroxine, 150 mcg, Oral, Daily  lidocaine, 1 patch, Transdermal, Q24H  metoprolol succinate XL, 100 mg, Oral, Q24H  mupirocin, 1 application, Topical, TID  pantoprazole, 40 mg, Oral, Daily  silver sulfadiazine, 1 application, Topical, Q12H  sodium chloride, 10 mL, Intravenous, Q12H  sodium chloride, 10 mL, Intravenous, Q12H  sodium chloride, 10 mL, Intravenous, Q12H  sodium chloride, 3 mL, Intravenous, Q12H        IV Meds:        Results Reviewed:   I have personally reviewed the results from the time of this admission to 4/7/2022 08:46 EDT     Results from last 7 days   Lab Units 04/07/22  0540 04/06/22  0751 04/05/22  0537 04/04/22  0955 04/03/22  0604   SODIUM mmol/L 135* 137 140 137 141   POTASSIUM mmol/L 3.9 3.6 3.7 4.1 4.5   CHLORIDE mmol/L 100 98 98 95* 97*   CO2 mmol/L 19.8* 22.4 22.5 23.2 21.2*   BUN mg/dL 45* 64* 104* 97* 101*   CREATININE mg/dL 3.02* 3.81* 5.01* 5.03* 4.69*   CALCIUM mg/dL 8.4* 8.2* 7.9* 7.6* 7.9*   BILIRUBIN mg/dL  --   --   --  0.6 0.7   ALK PHOS U/L  --   --   --  225* 250*   ALT (SGPT) U/L  --   --   --  81* 73*   AST (SGOT) U/L  --   --   --  61* 77*   GLUCOSE mg/dL 140* 142* 191* 428* 251*       Estimated Creatinine Clearance: 27.8 mL/min (A) (by C-G formula based on SCr of 3.02 mg/dL (H)).    Results from last 7 days   Lab Units 04/06/22  0751 04/05/22  0537 04/02/22  0453   PHOSPHORUS mg/dL 4.5 5.3* 6.6*             Results from last 7 days   Lab Units 04/07/22  0540 04/06/22  0751 04/05/22  0537 04/04/22  0955 04/03/22  0604   WBC  10*3/mm3 4.82 4.88 5.14 6.04 6.00   HEMOGLOBIN g/dL 10.4* 9.8* 9.9* 10.0* 9.7*   PLATELETS 10*3/mm3 60* 55* 60* 69* 77*       Results from last 7 days   Lab Units 04/03/22  0604 04/02/22  0453 03/31/22  1343   INR  1.84* 3.30* 2.54*       Brief Urine Lab Results  (Last result in the past 365 days)      Color   Clarity   Blood   Leuk Est   Nitrite   Protein   CREAT   Urine HCG        03/30/22 1705 Yellow   Clear   Trace   Negative   Negative   >=300 mg/dL (3+)                 No results found for: UTPCR    Imaging Results (Last 24 Hours)     ** No results found for the last 24 hours. **              Assessment / Plan     ASSESSMENT:    End-stage renal disease: After denying to have dialysis finally she has agreed, because of multiple issues with the belly peritoneal dialysis catheter was not an option at this point, she ended up with a tunneled dialysis catheter.  We will make arrangements for her outpatient dialysis, once she is better we will have further discussion with the family if they still are interested and wants to do her home peritoneal dialysis.    Anemia: Hemoglobin appears to be fairly stable, she does have iron deficiency and because of acute infection cannot give any IV iron continue with oral iron and will start BI as an outpatient.    Chronic diastolic congestive heart failure (HCC): We will try to diurese with high-dose diuretics and see if she will respond to it.  Volume status significantly better since on dialysis will take another 2 to 3 L until she gets to euvolemia.    Cor pulmonale, chronic (HCC): Longstanding history of untreated sleep apnea might be contributing.    Hypoglycemia: Oral hypoglycemic agents with prolonged effect from renal failure likely the cause.    Bilateral edema of lower extremity: She does not have much lower extremity edema most of the edema is in the abdominal and chest area appears to be having generalized anasarca.    Essential hypertension: Blood pressure is on  the low side we will hold off all blood pressure medications we will go ahead and start her on midodrine to maintain blood pressure while aggressively diuresing her.  I will go ahead and stop the hydralazine today and optimize her volume status and continue with the metoprolol.    Acquired hypothyroidism: Continue home Synthroid dose.    Type 2 diabetes mellitus with nephropathy (HCC): As per hospitalist service.    Cellulitis of both lower extremities: IV antibiotics have been started.    Bradycardia: Currently bradycardic will hold beta-blockers for now.      PLAN:  Next dialysis will be scheduled tomorrow.  She still appears to have significant third spaced fluid.  I will start her on torsemide 100 mg daily.  We will continue with increased ultrafiltration with dialysis as well.  Outpatient dialysis placement is in progress.  I think she has significant component of depression as well as baseline psychosis.  I will go ahead and start her on Seroquel 25 mg at night as well as Lexapro 10 mg during the day and see if that will help her have more mood stabilization.  Details were discussed with the patient.     Details were also discussed with the hospitalist service as well as nursing staff.  Continue with rest of the current treatment plan, and monitor with surveillance labs.  Further recommendations will depend on clinical course of the patient during the current hospitalization.     Thank you for involving us in the care of Millicent Mckeon.  Please feel free to call with any questions.    Milad Leone MD  04/07/22  08:46 EDT       [facility]  [phone]      Much of this encounter note is an electronic transcription/translation of spoken language to printed text. The electronic translation of spoken language may permit erroneous, or at times, nonsensical words or phrases to be inadvertently transcribed; Although I have reviewed the note for such errors, some may still exist.

## 2022-04-07 NOTE — CASE MANAGEMENT/SOCIAL WORK
Continued Stay Note  [facility]     Patient Name: Millicent Mckeon  MRN: [id number]  Today's Date: 4/7/2022    Admit Date: 3/30/2022    Discharge Plan    Row Name 04/07/22 1331   Plan   Plan Comments SW contacted [facility] and they are reviewing referral for dialysis. It is possible that they will not have a confirmed chair time until tomorrow. Pt will needs transportation assistance per the POA. SW to contact [facility] to schedule transportation once a chair time is available. Unable to use BMT so we will need to request [facility] or [facility]. SW to follow up at a later time.            HOLLY Major

## 2022-04-07 NOTE — PLAN OF CARE
Goal Outcome Evaluation:  Plan of Care Reviewed With: patient         Millicent is A+O x 4. She refused her morning medicines but was compliant with care for the rest of the day. She is on room air and A-Flutter with inverted T waves on cardiac telemetry.

## 2022-04-07 NOTE — PLAN OF CARE
"Visited pt in her room; she was alone.  Pt was resting in bed with her eyes closed. She appeared to be comfortable.  She awakened after calling her name twice.  When asked how she was feeling, she began to say \"I want to go home\".   She was informed that the plan at this time was to go home.  When asked about how dialysis was going, she stated okay.  During our conversation, the PCT tech brought lunch into the room.  The pt immediately opened her eyes and sat up in bed unassisted.  However, she did not like the menu choice and had the entire tray sent back.  The pt then pulled the covers over her head and no longer wanted to talk.  [facility] was going to arrange for a different lunch to be brought up.    Spoke to pt's niece, Val, via phone call.  She confirmed that the plan was for pt to return home.  She is hopeful that the pt will continue hemodialysis long enough until the family can get trained on peritoneal dialysis.  Home palliative services discussed as an extra resource that could follow and help make necessary transition to hospice should the pt decide she no longer wanted to continue dialysis.  Val felt that would be a good service and was in agreement for a referral to be made.  Palliative services will continue to follow.    Called [facility]; spoke to Bhavani.  Home palliative service referral given.  Update provided that pt would not be discharged until next week.  She will pull all needed info from Epic.                               "

## 2022-04-07 NOTE — PROGRESS NOTES
[facility]    Name:  Millicent Mckeon   Age:  61 y.o.  Sex:  female  :  1960  MRN:  [id number]   Visit Number:  [id number]  Admission Date:  3/30/2022  Date Of Service:  22  Primary Care Physician:  Marianela Albright APRN     LOS: 6 days :    Chief Complaint:      Follow-up weakness, renal failure    Subjective:    Patient seen at bedside.  No acute changes overnight per nursing staff.  She has been hesitant to take her medications, her only request is to go home as soon as possible.  I discussed we have yet to hear back on her outpatient dialysis chair time.  Her niece is going to help her.    Hospital Course:    The patient is a 61-year-old chronically ill-appearing female with history of atrial fibrillation, chronic systolic congestive heart failure, CKD stage IV, chronic cor pulmonale, morbid obesity, functional quadriplegia who had presented to the emergency room with generalized weakness.  She had apparently been found at home disheveled and multiple skin sores and wounds.  She had had multiple treatments lately for cellulitis of the lower extremity and hypoglycemia.  Apparently lives at home per family report.    Initial work-up was demonstrating bradycardia, hypoglycemia, with creatinine 3.74 and a BUN of 75.  Hemoglobin 10.8.  Chest x-ray show cardiomegaly and bilateral opacity in the lower zones.  Concern for pneumonia and she was given azithromycin and Rocephin.  She did develop some low blood pressures and became hypothermic upon admission.  She was started on midodrine and IV Lasix and had Wilkinson catheter placed.  There was concern for sepsis due to her recent hospitalization she was started on Zosyn and vancomycin.  She had a left internal jugular central venous line placed on 3/31/2022.  She subsequent had improvement in her blood pressures.  Her MRSA swab was negative, vancomycin was discontinued.  She also had some improvement in her urine  output.  She was noted to have overall worsening kidney function, discussions had about dialysis.  Patient was seen by palliative care services.  She did elect to be a DNR/DNI.  After discussion with patient's niece, patient is now in agreement with trial of dialysis and will plan on having a tunneled dialysis catheter placed.  This was placed on 4/5/2022 and she started hemodialysis.    Review of Systems:     All systems were reviewed and negative except as mentioned in subjective, assessment and plan.    Vital Signs:    Temp:  [97.3 °F (36.3 °C)-98.4 °F (36.9 °C)] 98.4 °F (36.9 °C)  Heart Rate:  [] 105  Resp:  [18-19] 18  BP: (126-159)/(78-91) 150/91    Intake and output:    I/O last 3 completed shifts:  In: 720 [P.O.:720]  Out: 6325 [Urine:1325; Other:5000]  I/O this shift:  In: 200 [P.O.:200]  Out: -     Physical Examination:    General Appearance:  Alert and cooperative.  No acute distress.  Chronically ill-appearing   Head:  Atraumatic and normocephalic.   Eyes: Conjunctivae and sclerae normal, no icterus. No pallor.   Throat: No oral lesions, no thrush, oral mucosa moist.   Neck: Supple, trachea midline, no thyromegaly.   Lungs:    Breath sounds diminished.   Heart:  Normal S1 and S2, no murmur, no gallop, no rub. No JVD.   Abdomen:   Normal bowel sounds, no masses, no organomegaly. Soft, nontender, nondistended, no rebound tenderness.  Obese, Wilkinson catheter noted   Extremities: Supple, 2+ lower edema unchanged, no cyanosis, no clubbing.  Venous insufficiency noted.   Skin:  Patient has multiple areas of erythema of the lower extremities, scabs, ulcerations of the skin.   Neurologic: Alert and oriented x 3. No facial asymmetry. Moves all four limbs. No tremors.      Laboratory results:    Results from last 7 days   Lab Units 04/07/22  0540 04/06/22  0751 04/05/22  0537 04/04/22  0955 04/03/22  0604   SODIUM mmol/L 135* 137 140 137 141   POTASSIUM mmol/L 3.9 3.6 3.7 4.1 4.5   CHLORIDE mmol/L 100 98 98  95* 97*   CO2 mmol/L 19.8* 22.4 22.5 23.2 21.2*   BUN mg/dL 45* 64* 104* 97* 101*   CREATININE mg/dL 3.02* 3.81* 5.01* 5.03* 4.69*   CALCIUM mg/dL 8.4* 8.2* 7.9* 7.6* 7.9*   BILIRUBIN mg/dL  --   --   --  0.6 0.7   ALK PHOS U/L  --   --   --  225* 250*   ALT (SGPT) U/L  --   --   --  81* 73*   AST (SGOT) U/L  --   --   --  61* 77*   GLUCOSE mg/dL 140* 142* 191* 428* 251*     Results from last 7 days   Lab Units 04/07/22  0540 04/06/22  0751 04/05/22  0537   WBC 10*3/mm3 4.82 4.88 5.14   HEMOGLOBIN g/dL 10.4* 9.8* 9.9*   HEMATOCRIT % 32.9* 31.5* 32.0*   PLATELETS 10*3/mm3 60* 55* 60*     Results from last 7 days   Lab Units 04/03/22  0604 04/02/22  0453   INR  1.84* 3.30*                 I have reviewed the patient's laboratory results.    Radiology results:    XR Chest 1 View    Result Date: 4/5/2022  FINAL REPORT CLINICAL HISTORY: line FINDINGS: SINGLE VIEW CHEST  There is cardiomegaly and pulmonary vascular congestion.  The mediastinum is unremarkable.  Right-sided deep line terminates at the cavoatrial junction.  Left sided deep line terminates in the lower SVC.  There are mild pulmonary opacities, favor edema.  There is no pneumothorax.     Impression: Deep lines as detailed above.  Pulmonary opacities, favor edema. Authenticated by Ashok Benito III, MD on 04/05/2022 04:53:45 PM    FL C Arm During Surgery    Result Date: 4/5/2022  This procedure was auto-finalized with no dictation required.    I have reviewed the patient's radiology reports.    Medication Review:     I have reviewed the patient's active and prn medications.     Problem List:      Hypoglycemia    Bilateral edema of lower extremity    Essential hypertension    Acquired hypothyroidism    Type 2 diabetes mellitus with nephropathy (HCC)    Cellulitis of both lower extremities    Anemia due to stage 4 chronic kidney disease (HCC)    Chronic diastolic congestive heart failure (HCC)    Cor pulmonale, chronic (HCC)    Chronic kidney disease, stage  IV (severe) ([facility])    Bradycardia      Assessment:    1. Sepsis with generalized weakness, hypothermia and hypotension secondary to #2,POA, resolved.  2. Bilateral lower extremity cellulitis, POA, improving.  3. Acute hypoglycemia, POA, resolved.  4. Severe bradycardia possibly related to medications in the setting of worsening renal failure, resolved.  5. Progressive worsening of chronic kidney disease stage IV with volume overload, POA, improving.  6. Chronic venous stasis of the lower extremities with multiple skin ulcers, POA..  7. Chronic systolic heart failure with ejection fraction of 35%.  8. Paroxysmal atrial fibrillation on apixaban.  9. Chronic cor pulmonale.  10. Chronic hypoxic respiratory failure on home oxygen.  11. Diabetes mellitus type 2 with nephropathy.  12. Morbid obesity with a BMI of 57.  13. Anemia of chronic kidney disease.  14. Functional quadriplegia.  15. Acquired hypothyroidism.  16. Sacral deep tissue injury present on admission.      Plan:    Sepsis/hypothermia/hypotension.  We will continue with Augmentin.     Generalized weakness/hypoglycemia/bradycardia.    Likely multifactorial in setting of severe multiple comorbidities, functional quadriplegia, worsening renal and congestive heart failure noted.  Her Toprol-XL was restarted.  Her amiodarone is on hold.     Progressive worsening of chronic kidney disease stage IV.  Hemodialysis initiated after tunneled dialysis catheter placed yesterday.  Will need to set up outpatient hemodialysis.  POA is going to help her.  If patient elects not to proceed with hemodialysis moving forward, recommendation will be for hospice care.     Diabetes mellitus type 2.  A1c of 8.5.  She continues on Levemir 20 units twice a day with aggressive SSI protocol.    Continue with dialysis, likely again tomorrow.  Once outpatient chair time is been arranged, anticipate she can be discharged.  She remains high risk.  Would likely benefit from hospice care moving  forward if elects to not continue dialysis.    DVT Prophylaxis: Eliquis is on hold.  Code Status: DNR/DNI  Diet: Renal  Discharge Plan: ADI Valencia DO  04/07/22  14:47 EDT    Dictated utilizing Dragon dictation.

## 2022-04-08 LAB
ACINETOBACTER SCREEN CX: NORMAL
DEPRECATED RDW RBC AUTO: 58.8 FL (ref 37–54)
ERYTHROCYTE [DISTWIDTH] IN BLOOD BY AUTOMATED COUNT: 23.4 % (ref 12.3–15.4)
GLUCOSE BLDC GLUCOMTR-MCNC: 159 MG/DL (ref 70–130)
GLUCOSE BLDC GLUCOMTR-MCNC: 182 MG/DL (ref 70–130)
GLUCOSE BLDC GLUCOMTR-MCNC: 199 MG/DL (ref 70–130)
GLUCOSE BLDC GLUCOMTR-MCNC: 201 MG/DL (ref 70–130)
HCT VFR BLD AUTO: 32.7 % (ref 34–46.6)
HGB BLD-MCNC: 9.9 G/DL (ref 12–15.9)
MCH RBC QN AUTO: 23.8 PG (ref 26.6–33)
MCHC RBC AUTO-ENTMCNC: 30.3 G/DL (ref 31.5–35.7)
MCV RBC AUTO: 78.6 FL (ref 79–97)
PLATELET # BLD AUTO: 64 10*3/MM3 (ref 140–450)
PMV BLD AUTO: ABNORMAL FL
RBC # BLD AUTO: 4.16 10*6/MM3 (ref 3.77–5.28)
VRE SPEC QL CULT: ABNORMAL
WBC NRBC COR # BLD: 6.05 10*3/MM3 (ref 3.4–10.8)

## 2022-04-08 PROCEDURE — 99232 SBSQ HOSP IP/OBS MODERATE 35: CPT | Performed by: FAMILY MEDICINE

## 2022-04-08 PROCEDURE — 63710000001 INSULIN ASPART PER 5 UNITS: Performed by: FAMILY MEDICINE

## 2022-04-08 PROCEDURE — 63710000001 INSULIN DETEMIR PER 5 UNITS: Performed by: FAMILY MEDICINE

## 2022-04-08 PROCEDURE — 85027 COMPLETE CBC AUTOMATED: CPT | Performed by: INTERNAL MEDICINE

## 2022-04-08 PROCEDURE — 5A1D70Z PERFORMANCE OF URINARY FILTRATION, INTERMITTENT, LESS THAN 6 HOURS PER DAY: ICD-10-PCS | Performed by: INTERNAL MEDICINE

## 2022-04-08 PROCEDURE — 82962 GLUCOSE BLOOD TEST: CPT

## 2022-04-08 RX ORDER — ALBUMIN (HUMAN) 12.5 G/50ML
12.5 SOLUTION INTRAVENOUS AS NEEDED
Status: ACTIVE | OUTPATIENT
Start: 2022-04-08 | End: 2022-04-09

## 2022-04-08 RX ORDER — HEPARIN SODIUM 1000 [USP'U]/ML
1000 INJECTION, SOLUTION INTRAVENOUS; SUBCUTANEOUS AS NEEDED
Status: DISCONTINUED | OUTPATIENT
Start: 2022-04-08 | End: 2022-04-14 | Stop reason: HOSPADM

## 2022-04-08 RX ADMIN — MUPIROCIN 1 APPLICATION: 2 CREAM TOPICAL at 18:02

## 2022-04-08 RX ADMIN — Medication 3 ML: at 08:40

## 2022-04-08 RX ADMIN — INSULIN ASPART 3 UNITS: 100 INJECTION, SOLUTION INTRAVENOUS; SUBCUTANEOUS at 06:45

## 2022-04-08 RX ADMIN — ASPIRIN 81 MG: 81 TABLET, CHEWABLE ORAL at 08:39

## 2022-04-08 RX ADMIN — SILVER SULFADIAZINE 1 APPLICATION: 10 CREAM TOPICAL at 08:41

## 2022-04-08 RX ADMIN — KETOCONAZOLE 1 APPLICATION: 20 CREAM TOPICAL at 20:30

## 2022-04-08 RX ADMIN — QUETIAPINE FUMARATE 25 MG: 25 TABLET ORAL at 20:30

## 2022-04-08 RX ADMIN — MUPIROCIN 1 APPLICATION: 2 CREAM TOPICAL at 08:41

## 2022-04-08 RX ADMIN — SILVER SULFADIAZINE 1 APPLICATION: 10 CREAM TOPICAL at 20:30

## 2022-04-08 RX ADMIN — FERROUS SULFATE TAB EC 324 MG (65 MG FE EQUIVALENT) 324 MG: 324 (65 FE) TABLET DELAYED RESPONSE at 08:38

## 2022-04-08 RX ADMIN — Medication 10 ML: at 08:39

## 2022-04-08 RX ADMIN — AMOXICILLIN AND CLAVULANATE POTASSIUM 500 MG: 500; 125 TABLET, FILM COATED ORAL at 08:38

## 2022-04-08 RX ADMIN — MUPIROCIN 1 APPLICATION: 2 CREAM TOPICAL at 20:30

## 2022-04-08 RX ADMIN — INSULIN DETEMIR 10 UNITS: 100 INJECTION, SOLUTION SUBCUTANEOUS at 20:30

## 2022-04-08 RX ADMIN — Medication 10 ML: at 21:00

## 2022-04-08 RX ADMIN — KETOCONAZOLE 1 APPLICATION: 20 CREAM TOPICAL at 08:41

## 2022-04-08 RX ADMIN — LIDOCAINE 1 PATCH: 50 PATCH CUTANEOUS at 19:55

## 2022-04-08 RX ADMIN — FERROUS SULFATE TAB EC 324 MG (65 MG FE EQUIVALENT) 324 MG: 324 (65 FE) TABLET DELAYED RESPONSE at 18:01

## 2022-04-08 RX ADMIN — DOCUSATE SODIUM 100 MG: 100 CAPSULE, LIQUID FILLED ORAL at 20:30

## 2022-04-08 RX ADMIN — PANTOPRAZOLE SODIUM 40 MG: 40 TABLET, DELAYED RELEASE ORAL at 08:38

## 2022-04-08 RX ADMIN — Medication 1 CAPSULE: at 08:38

## 2022-04-08 RX ADMIN — TORSEMIDE 100 MG: 100 TABLET ORAL at 08:38

## 2022-04-08 RX ADMIN — INSULIN ASPART 5 UNITS: 100 INJECTION, SOLUTION INTRAVENOUS; SUBCUTANEOUS at 18:01

## 2022-04-08 RX ADMIN — Medication 1 TABLET: at 08:38

## 2022-04-08 RX ADMIN — METOPROLOL SUCCINATE 100 MG: 100 TABLET, EXTENDED RELEASE ORAL at 08:38

## 2022-04-08 RX ADMIN — ISOSORBIDE MONONITRATE 30 MG: 30 TABLET, EXTENDED RELEASE ORAL at 08:38

## 2022-04-08 RX ADMIN — AMOXICILLIN AND CLAVULANATE POTASSIUM 500 MG: 500; 125 TABLET, FILM COATED ORAL at 20:30

## 2022-04-08 RX ADMIN — INSULIN DETEMIR 10 UNITS: 100 INJECTION, SOLUTION SUBCUTANEOUS at 08:48

## 2022-04-08 RX ADMIN — Medication 1 CAPSULE: at 20:30

## 2022-04-08 RX ADMIN — DOCUSATE SODIUM 100 MG: 100 CAPSULE, LIQUID FILLED ORAL at 08:38

## 2022-04-08 RX ADMIN — LEVOTHYROXINE SODIUM 150 MCG: 150 TABLET ORAL at 08:38

## 2022-04-08 RX ADMIN — ESCITALOPRAM OXALATE 10 MG: 10 TABLET ORAL at 08:38

## 2022-04-08 NOTE — PLAN OF CARE
Goal Outcome Evaluation:  Plan of Care Reviewed With: patient         Interventions provided per order

## 2022-04-08 NOTE — CASE MANAGEMENT/SOCIAL WORK
Continued Stay Note   [facility]     Patient Name: Millicent Mckeon  MRN: [id number]  Today's Date: 4/8/2022    Admit Date: 3/30/2022     Discharge Plan     Row Name 04/08/22 0955       Plan    Plan Comments SW contacted [facility] regarding a chair time for dialysis. They state that she will be a TTS but is not are of the time. They are awaiting an approval from Dr. Leone. Dianne states that is likely to be Monday before they have a chair time. Will attempt to arrange transportation through [facility] once chair time is known.               Discharge Codes    No documentation.               Expected Discharge Date and Time     Expected Discharge Date Expected Discharge Time    Apr 11, 2022             HOLLY Waite

## 2022-04-08 NOTE — PROGRESS NOTES
[facility]    Name:  Millicent Mckeon   Age:  61 y.o.  Sex:  female  :  1960  MRN:  [id number]   Visit Number:  [id number]  Admission Date:  3/30/2022  Date Of Service:  22  Primary Care Physician:  Marianela Albright APRN     LOS: 7 days :    Chief Complaint:      Follow-up weakness, renal failure    Subjective:    Patient seen again today.  No acute changes overnight per nursing staff.  She is still very hesitant to talk about anything other than going home.  She denied any shortness of breath.  She has been eating some.  No family currently at bedside    Hospital Course:    The patient is a 61-year-old chronically ill-appearing female with history of atrial fibrillation, chronic systolic congestive heart failure, CKD stage IV, chronic cor pulmonale, morbid obesity, functional quadriplegia who had presented to the emergency room with generalized weakness.  She had apparently been found at home disheveled and multiple skin sores and wounds.  She had had multiple treatments lately for cellulitis of the lower extremity and hypoglycemia.  Apparently lives at home per family report.    Initial work-up was demonstrating bradycardia, hypoglycemia, with creatinine 3.74 and a BUN of 75.  Hemoglobin 10.8.  Chest x-ray show cardiomegaly and bilateral opacity in the lower zones.  Concern for pneumonia and she was given azithromycin and Rocephin.  She did develop some low blood pressures and became hypothermic upon admission.  She was started on midodrine and IV Lasix and had Wilkinson catheter placed.  There was concern for sepsis due to her recent hospitalization she was started on Zosyn and vancomycin.  She had a left internal jugular central venous line placed on 3/31/2022.  She subsequent had improvement in her blood pressures.  Her MRSA swab was negative, vancomycin was discontinued.  She also had some improvement in her urine output.  She was noted to have overall  worsening kidney function, discussions had about dialysis.  Patient was seen by palliative care services.  She did elect to be a DNR/DNI.  After discussion with patient's niece, patient is now in agreement with trial of dialysis and will plan on having a tunneled dialysis catheter placed.  This was placed on 4/5/2022 and she started hemodialysis.  Case management consulted to work on outpatient dialysis set up.    Review of Systems:     All systems were reviewed and negative except as mentioned in subjective, assessment and plan.    Vital Signs:    Temp:  [97.1 °F (36.2 °C)-98.5 °F (36.9 °C)] 97.6 °F (36.4 °C)  Heart Rate:  [89-98] 98  Resp:  [16-18] 18  BP: (128-148)/(76-82) 142/81    Intake and output:    I/O last 3 completed shifts:  In: 670 [P.O.:670]  Out: 290 [Urine:290]  I/O this shift:  In: 360 [P.O.:360]  Out: -     Physical Examination:    General Appearance:  Alert and cooperative.  No acute distress.  Chronically ill-appearing   Head:  Atraumatic and normocephalic.   Eyes: Conjunctivae and sclerae normal, no icterus. No pallor.   Throat: No oral lesions, no thrush, oral mucosa moist.   Neck: Supple, trachea midline, no thyromegaly.   Lungs:    Breath sounds diminished.   Heart:  Normal S1 and S2, no murmur, no gallop, no rub. No JVD.   Abdomen:   Normal bowel sounds, no masses, no organomegaly. Soft, nontender, nondistended, no rebound tenderness.  Obese, Wilkinson catheter noted   Extremities: Supple, 2+ lower edema unchanged, no cyanosis, no clubbing.  Venous insufficiency noted.   Skin:  Patient has multiple areas of erythema of the lower extremities, scabs, ulcerations of the skin.   Neurologic: Alert and oriented x 3. No facial asymmetry. Moves all four limbs. No tremors.  Weakness     Laboratory results:    Results from last 7 days   Lab Units 04/07/22  0540 04/06/22  0751 04/05/22  0537 04/04/22  0955 04/03/22  0604   SODIUM mmol/L 135* 137 140 137 141   POTASSIUM mmol/L 3.9 3.6 3.7 4.1 4.5   CHLORIDE  mmol/L 100 98 98 95* 97*   CO2 mmol/L 19.8* 22.4 22.5 23.2 21.2*   BUN mg/dL 45* 64* 104* 97* 101*   CREATININE mg/dL 3.02* 3.81* 5.01* 5.03* 4.69*   CALCIUM mg/dL 8.4* 8.2* 7.9* 7.6* 7.9*   BILIRUBIN mg/dL  --   --   --  0.6 0.7   ALK PHOS U/L  --   --   --  225* 250*   ALT (SGPT) U/L  --   --   --  81* 73*   AST (SGOT) U/L  --   --   --  61* 77*   GLUCOSE mg/dL 140* 142* 191* 428* 251*     Results from last 7 days   Lab Units 04/08/22  0530 04/07/22  0540 04/06/22  0751   WBC 10*3/mm3 6.05 4.82 4.88   HEMOGLOBIN g/dL 9.9* 10.4* 9.8*   HEMATOCRIT % 32.7* 32.9* 31.5*   PLATELETS 10*3/mm3 64* 60* 55*     Results from last 7 days   Lab Units 04/03/22  0604 04/02/22  0453   INR  1.84* 3.30*                 I have reviewed the patient's laboratory results.    Radiology results:    No radiology results from the last 24 hrs  I have reviewed the patient's radiology reports.    Medication Review:     I have reviewed the patient's active and prn medications.     Problem List:      Hypoglycemia    Bilateral edema of lower extremity    Essential hypertension    Acquired hypothyroidism    Type 2 diabetes mellitus with nephropathy (HCC)    Cellulitis of both lower extremities    Anemia due to stage 4 chronic kidney disease (HCC)    Chronic diastolic congestive heart failure (HCC)    Cor pulmonale, chronic (HCC)    Chronic kidney disease, stage IV (severe) (HCC)    Bradycardia      Assessment:    1. Sepsis with generalized weakness, hypothermia and hypotension secondary to #2,POA, resolved.  2. Bilateral lower extremity cellulitis, POA, improving.  3. Acute hypoglycemia, POA, resolved.  4. Severe bradycardia possibly related to medications in the setting of worsening renal failure, resolved.  5. Progressive worsening of chronic kidney disease stage IV with volume overload, POA, improving.  6. Chronic venous stasis of the lower extremities with multiple skin ulcers, POA..  7. Chronic systolic heart failure with ejection fraction  of 35%.  8. Paroxysmal atrial fibrillation on apixaban.  9. Chronic cor pulmonale.  10. Chronic hypoxic respiratory failure on home oxygen.  11. Diabetes mellitus type 2 with nephropathy.  12. Morbid obesity with a BMI of 57.  13. Anemia of chronic kidney disease.  14. Functional quadriplegia.  15. Acquired hypothyroidism.  16. Sacral deep tissue injury present on admission.      Plan:    Sepsis/hypothermia/hypotension.  We will continue with Augmentin.     Generalized weakness/hypoglycemia/bradycardia.    Likely multifactorial in setting of severe multiple comorbidities, functional quadriplegia, worsening renal and congestive heart failure noted.  Her Toprol-XL was restarted.  Her amiodarone is on hold.     Progressive worsening of chronic kidney disease stage IV.  Hemodialysis initiated after tunneled dialysis catheter placed yesterday.  Will need to set up outpatient hemodialysis.  POA is going to help her.  If patient elects not to proceed with hemodialysis moving forward, recommendation will be for hospice care.     Diabetes mellitus type 2.  A1c of 8.5.  She continues on Levemir 20 units twice a day with aggressive SSI protocol.    Continue with dialysis, likely again tomorrow.  Once outpatient chair time is been arranged, anticipate she can be discharged.  She remains high risk.  Would likely benefit from hospice care moving forward if elects to not continue dialysis.    DVT Prophylaxis: Eliquis is on hold.  Code Status: DNR/DNI  Diet: Renal  Discharge Plan: ADI Valencia DO  04/08/22  14:16 EDT    Dictated utilizing Dragon dictation.

## 2022-04-08 NOTE — PROGRESS NOTES
[facility] Progress Note  [facility]. [place]        Patient Name: Millicent Mckeon  : 1960  MRN: [id number]   LOS: 7 days    Patient Care Team:  Marianela Albright APRN as PCP - General (Family Medicine)  Geremias Shepherd MD as Consulting Physician (General Surgery)  Lisa Cardoso MD as Surgeon (General Surgery)    Chief Complaint:    Chief Complaint   Patient presents with   • Weakness - Generalized     Unable to stand     Primary Care Physician:  Marianela Albright APRN  Date of admission: 3/30/2022    Subjective     Interval History:   Events noted from last 24 hours.  Patient seen during dialysis clinically stable.  She was a lot more awake alert and interactive was able to keep a conversation.  She was watching cartoon network.  Outpatient dialysis schedule is still not available.  Clinically appears to be improving.  Review of Systems:   As noted above    Objective     Vitals:   Temp:  [97.1 °F (36.2 °C)-98.5 °F (36.9 °C)] 97.6 °F (36.4 °C)  Heart Rate:  [] 98  Resp:  [16-18] 18  BP: (128-150)/(76-91) 142/81    Intake/Output Summary (Last 24 hours) at 2022 0905  Last data filed at 2022 0548  Gross per 24 hour   Intake 470 ml   Output 240 ml   Net 230 ml       Physical Exam:    General Appearance: alert,  no acute distress   Skin: warm and dry  HEENT: oral mucosa normal, nonicteric sclera  Neck: supple, no JVD  Lungs: Still have decreased breath sounds at both bases.  Heart: RRR, normal S1 and S2  Abdomen: Obese, soft to firm, nontender, nondistended, she does have dependent edema mostly on the back of her body including sacral area.  : no palpable bladder  Extremities: 1 + edema, mostly it is dependent edema, no cyanosis or clubbing  Neuro: Awake and interactive, randomly move extremities.    Scheduled Meds:     Current Facility-Administered Medications   Medication Dose Route Frequency Provider Last Rate Last Admin   • acetaminophen (TYLENOL) tablet  650 mg  650 mg Oral Q4H PRN Cheyanne Valencia DO   650 mg at 03/31/22 0629    Or   • acetaminophen (TYLENOL) 160 MG/5ML solution 650 mg  650 mg Oral Q4H PRN Cheyanne Valencia DO        Or   • acetaminophen (TYLENOL) suppository 650 mg  650 mg Rectal Q4H PRN Cheyanne Valencia DO       • amoxicillin-clavulanate (AUGMENTIN) 500-125 MG per tablet 500 mg  1 tablet Oral Q12H Cheyanne Valencia DO   500 mg at 04/08/22 0838   • aspirin chewable tablet 81 mg  81 mg Oral Daily Cheyanne Valencia DO   81 mg at 04/08/22 0839   • b complex-vitamin c-folic acid (NEPHRO-LOIS) tablet 1 tablet  1 tablet Oral Daily Cheyanne Valencia DO   1 tablet at 04/08/22 0838   • dextrose (D50W) (25 g/50 mL) IV injection 25 g  25 g Intravenous Q15 Min PRN Cheyanne Valencia DO       • dextrose (D50W) (25 g/50 mL) IV injection 50 mL  50 mL Intravenous Q1H PRN Cheyanne Valencia DO   50 mL at 03/31/22 0630   • dextrose (GLUTOSE) oral gel 1 tube  1 tube Oral Q15 Min PRN Cheyanne Valencia DO       • docusate sodium (COLACE) capsule 100 mg  100 mg Oral BID Cheyanne Valencia DO   100 mg at 04/08/22 0838   • escitalopram (LEXAPRO) tablet 10 mg  10 mg Oral Daily Milad Leone MD   10 mg at 04/08/22 0838   • ferrous sulfate EC tablet 324 mg  324 mg Oral BID With Meals Cheyanne Valencia DO   324 mg at 04/08/22 0838   • glucagon (human recombinant) (GLUCAGEN DIAGNOSTIC) injection 1 mg  1 mg Subcutaneous PRN Cheyanne Valencia DO       • heparin (porcine) injection 1,000 Units  1,000 Units Intracatheter PRN Cheyanne Valencia DO       • insulin aspart (novoLOG) injection 0-14 Units  0-14 Units Subcutaneous TID AC Cheyanne Valencia DO   3 Units at 04/08/22 0645   • insulin detemir (LEVEMIR) injection 10 Units  10 Units Subcutaneous Q12H Cheyanne Valencia DO   10 Units at 04/07/22 2015   • ipratropium-albuterol (DUO-NEB) nebulizer solution 3 mL  3 mL Nebulization Q4H PRN  Cheyanne Valencia, DO       • isosorbide mononitrate (IMDUR) 24 hr tablet 30 mg  30 mg Oral Daily Cheyanne Valencia DO   30 mg at 04/08/22 0838   • ketoconazole (NIZORAL) 2 % cream 1 application  1 application Topical BID Cheyanne Valencia, DO   1 application at 04/08/22 0841   • lactobacillus acidophilus (RISAQUAD) capsule 1 capsule  1 capsule Oral BID Cheyanne Valencia DO   1 capsule at 04/08/22 0838   • levothyroxine (SYNTHROID, LEVOTHROID) tablet 150 mcg  150 mcg Oral Daily Cheyanne Valencia DO   150 mcg at 04/08/22 0838   • lidocaine (LIDODERM) 5 % 1 patch  1 patch Transdermal Q24H Cheyanne Valencia DO   1 patch at 04/07/22 2015   • metoprolol succinate XL (TOPROL-XL) 24 hr tablet 100 mg  100 mg Oral Q24H Cheyanne Valencia DO   100 mg at 04/08/22 0838   • mupirocin (BACTROBAN) 2 % cream 1 application  1 application Topical TID Cheyanne Valencia, DO   1 application at 04/08/22 0841   • ondansetron (ZOFRAN) injection 4 mg  4 mg Intravenous Q6H PRN Cheyanne Valencia DO       • pantoprazole (PROTONIX) EC tablet 40 mg  40 mg Oral Daily Cheyanne Valencia DO   40 mg at 04/08/22 0838   • QUEtiapine (SEROquel) tablet 25 mg  25 mg Oral Nightly Milad Leone MD   25 mg at 04/07/22 2015   • silver sulfadiazine (SILVADENE, SSD) 1 % cream 1 application  1 application Topical Q12H Cheyanne Valencia, DO   1 application at 04/08/22 0841   • sodium chloride 0.9 % flush 10 mL  10 mL Intravenous PRN Cheyanne Valencia DO       • sodium chloride 0.9 % flush 10 mL  10 mL Intravenous Q12H Cheyanne Valencia,    10 mL at 04/08/22 0839   • sodium chloride 0.9 % flush 10 mL  10 mL Intravenous Q12H Cheyanne Valencia DO   10 mL at 04/08/22 0839   • sodium chloride 0.9 % flush 10 mL  10 mL Intravenous Q12H Cheyanne Valencia,    10 mL at 04/08/22 0839   • sodium chloride 0.9 % flush 10 mL  10 mL Intravenous PRN Cheyanne Valencia, DO       • sodium  chloride 0.9 % flush 20 mL  20 mL Intravenous PRN Cheyanne Valencia, DO       • sodium chloride 0.9 % flush 3 mL  3 mL Intravenous Q12H Cheyanne Valencia DO   3 mL at 04/08/22 0840   • sodium chloride 0.9 % flush 3-10 mL  3-10 mL Intravenous PRN Cheyanne Valencia, DO       • torsemide (DEMADEX) tablet 100 mg  100 mg Oral Daily Milad Leone MD   100 mg at 04/08/22 0838       amoxicillin-clavulanate, 1 tablet, Oral, Q12H  aspirin, 81 mg, Oral, Daily  b complex-vitamin c-folic acid, 1 tablet, Oral, Daily  docusate sodium, 100 mg, Oral, BID  escitalopram, 10 mg, Oral, Daily  ferrous sulfate, 324 mg, Oral, BID With Meals  insulin aspart, 0-14 Units, Subcutaneous, TID AC  insulin detemir, 10 Units, Subcutaneous, Q12H  isosorbide mononitrate, 30 mg, Oral, Daily  ketoconazole, 1 application, Topical, BID  lactobacillus acidophilus, 1 capsule, Oral, BID  levothyroxine, 150 mcg, Oral, Daily  lidocaine, 1 patch, Transdermal, Q24H  metoprolol succinate XL, 100 mg, Oral, Q24H  mupirocin, 1 application, Topical, TID  pantoprazole, 40 mg, Oral, Daily  QUEtiapine, 25 mg, Oral, Nightly  silver sulfadiazine, 1 application, Topical, Q12H  sodium chloride, 10 mL, Intravenous, Q12H  sodium chloride, 10 mL, Intravenous, Q12H  sodium chloride, 10 mL, Intravenous, Q12H  sodium chloride, 3 mL, Intravenous, Q12H  torsemide, 100 mg, Oral, Daily        IV Meds:        Results Reviewed:   I have personally reviewed the results from the time of this admission to 4/8/2022 09:05 EDT     Results from last 7 days   Lab Units 04/07/22  0540 04/06/22  0751 04/05/22  0537 04/04/22  0955 04/03/22  0604   SODIUM mmol/L 135* 137 140 137 141   POTASSIUM mmol/L 3.9 3.6 3.7 4.1 4.5   CHLORIDE mmol/L 100 98 98 95* 97*   CO2 mmol/L 19.8* 22.4 22.5 23.2 21.2*   BUN mg/dL 45* 64* 104* 97* 101*   CREATININE mg/dL 3.02* 3.81* 5.01* 5.03* 4.69*   CALCIUM mg/dL 8.4* 8.2* 7.9* 7.6* 7.9*   BILIRUBIN mg/dL  --   --   --  0.6 0.7   ALK PHOS U/L  --    --   --  225* 250*   ALT (SGPT) U/L  --   --   --  81* 73*   AST (SGOT) U/L  --   --   --  61* 77*   GLUCOSE mg/dL 140* 142* 191* 428* 251*       Estimated Creatinine Clearance: 27.8 mL/min (A) (by C-G formula based on SCr of 3.02 mg/dL (H)).    Results from last 7 days   Lab Units 04/06/22  0751 04/05/22  0537 04/02/22  0453   PHOSPHORUS mg/dL 4.5 5.3* 6.6*             Results from last 7 days   Lab Units 04/08/22  0530 04/07/22  0540 04/06/22  0751 04/05/22  0537 04/04/22  0955   WBC 10*3/mm3 6.05 4.82 4.88 5.14 6.04   HEMOGLOBIN g/dL 9.9* 10.4* 9.8* 9.9* 10.0*   PLATELETS 10*3/mm3 64* 60* 55* 60* 69*       Results from last 7 days   Lab Units 04/03/22  0604 04/02/22  0453   INR  1.84* 3.30*       Brief Urine Lab Results  (Last result in the past 365 days)      Color   Clarity   Blood   Leuk Est   Nitrite   Protein   CREAT   Urine HCG        03/30/22 1705 Yellow   Clear   Trace   Negative   Negative   >=300 mg/dL (3+)                 No results found for: UTPCR    Imaging Results (Last 24 Hours)     ** No results found for the last 24 hours. **              Assessment / Plan     ASSESSMENT:    End-stage renal disease: After denying to have dialysis finally she has agreed, because of multiple issues with the belly peritoneal dialysis catheter was not an option at this point, she ended up with a tunneled dialysis catheter.  We will make arrangements for her outpatient dialysis, once she is better we will have further discussion with the family if they still are interested and wants to do her home peritoneal dialysis.    Anemia: Hemoglobin appears to be fairly stable, she does have iron deficiency and because of acute infection cannot give any IV iron continue with oral iron and will start BI as an outpatient.    Chronic diastolic congestive heart failure (HCC): We will try to diurese with high-dose diuretics and see if she will respond to it.  Volume status significantly better since on dialysis will take another 2  to 3 L until she gets to euvolemia.    Cor pulmonale, chronic (HCC): Longstanding history of untreated sleep apnea might be contributing.    Hypoglycemia: Oral hypoglycemic agents with prolonged effect from renal failure likely the cause.    Bilateral edema of lower extremity: She does not have much lower extremity edema most of the edema is in the abdominal and chest area appears to be having generalized anasarca.    Essential hypertension: Blood pressure is on the low side we will hold off all blood pressure medications we will go ahead and start her on midodrine to maintain blood pressure while aggressively diuresing her.  I will go ahead and stop the hydralazine today and optimize her volume status and continue with the metoprolol.    Acquired hypothyroidism: Continue home Synthroid dose.    Type 2 diabetes mellitus with nephropathy (HCC): As per hospitalist service.    Cellulitis of both lower extremities: IV antibiotics have been started.    Bradycardia: It has resolved continue to increase beta-blocker for blood pressure control.      PLAN:  We will go ahead and put her on a Monday Wednesday Friday schedule at this point.  Continue with torsemide 100 mg a day.  Likely will keep the blood pressure more stable as well as continue to improve the volume status as well.  Patient seen during dialysis clinically stable.  It does appear that Seroquel and Lexapro has helped her.  Once all the arrangements are made for her transportation and outpatient dialysis she will be okay to go home.  Details were discussed with the patient.     Details were also discussed with the hospitalist service as well as nursing staff.  Continue with rest of the current treatment plan, and monitor with surveillance labs.  Further recommendations will depend on clinical course of the patient during the current hospitalization.     Thank you for involving us in the care of Millicent Mckeon.  Please feel free to call with any  questions.    Milad Leone MD  04/08/22  09:05 EDT       [facility]  [phone]      Much of this encounter note is an electronic transcription/translation of spoken language to printed text. The electronic translation of spoken language may permit erroneous, or at times, nonsensical words or phrases to be inadvertently transcribed; Although I have reviewed the note for such errors, some may still exist.

## 2022-04-08 NOTE — PLAN OF CARE
Goal Outcome Evaluation:  Plan of Care Reviewed With: patient        Progress: improving  Outcome Evaluation: pleasant patient with no complaint of pain at this time-medications given per Dr. Valencia and Dr. Leone's orders-monitor blood sugar with coverage per protocol-monitor labs and continue to monitor patient-patient to have dialysis 04/08/2022

## 2022-04-08 NOTE — PROGRESS NOTES
Adult Nutrition  Assessment/PES    Patient Name:  Millicent Mckeon  YOB: 1960  MRN: [id number]  Admit Date:  3/30/2022    Assessment Date:  4/8/2022    Comments:      Recommend:    1. Continue current diet as medically appropriate and tolerated.  2. Continue to encourage PO intake as appropriate. Avg of 68% x 7 meals. RD notes pt weight loss of 22 lb (6.5%) within the past week; could be r/t fluid status as pt has been started on hemodialysis.   3. RD adjusted Nova Renal to TID. Arginaid ordered TID.  4. Continue renal MVI daily.   5. Continue to monitor and replace electrolytes PRN.    RD to follow pt and available PRN.       Reason for Assessment     Row Name 04/08/22 1210          Reason for Assessment    Reason For Assessment per organizational policy;diagnosis/disease state;identified at risk by screening criteria;nurse/nurse practitioner consult;follow-up protocol     Diagnosis cardiac disease;diabetes diagnosis/complications;renal disease;infection/sepsis     Identified At Risk by Screening Criteria large or nonhealing wound, burn or pressure injury;need for education                    Labs/Tests/Procedures/Meds     Row Name 04/08/22 1211          Labs/Procedures/Meds    Lab Results Reviewed reviewed, pertinent     Lab Results Comments high: glu, BUN, Cr, ALT Low: Na+, platelets, alb            Medications    Pertinent Medications Reviewed reviewed, pertinent     Pertinent Medications Comments nephrovite, colace, ferrous sulfate, novolog, levemir, protonix, NaCl                Physical Findings     Row Name 04/08/22 1245          Physical Findings    Overall Physical Appearance obese, diabetic ulcer bilateral legs, PI coccyx, wound left pubis, hemodialysis cath                  Nutrition Prescription Ordered     Row Name 04/08/22 1246          Nutrition Prescription PO    Current PO Diet Regular     Common Modifiers Consistent Carbohydrate;Renal                Evaluation of Received  Nutrient/Fluid Intake     Row Name 04/08/22 1246          PO Evaluation    Number of Days PO Intake Evaluated 3 days     Number of Meals 7     % PO Intake 68                     Problem/Interventions:   Problem 1     Row Name 04/08/22 1246          Nutrition Diagnoses Problem 1    Problem 1 Predicted Suboptimal Intake     Etiology (related to) Medical Diagnosis     Renal CKD     Signs/Symptoms (evidenced by) PO Intake;Unintended Weight Change     Percent (%) intake recorded 68 %     Over number of meals 7     Unintended Weight Change Loss     Number of Pounds Lost 22     Weight loss time period one week                Problem 2     Row Name 04/08/22 1248          Nutrition Diagnoses Problem 2    Problem 2 Increased Nutrient Needs     Macronutrient Kcal;Fluid;Protein     Micronutrient Vitamin;Mineral     Etiology (related to) Medical Diagnosis     Skin Pressure injury     Signs/Symptoms (evidenced by) Other (comment)  diabetic ulcer bilateral legs, PI coccyx                    Intervention Goal     Row Name 04/08/22 1248          Intervention Goal    General Maintain nutrition;Disease management/therapy;Reduce/improve symptoms;Improved nutrition related lab(s);Meet nutritional needs for age/condition     PO Meet estimated needs;Establish PO;Tolerate PO;Increase intake     Weight No significant weight loss                Nutrition Intervention     Row Name 04/08/22 1248          Nutrition Intervention    RD/Tech Action Follow Tx progress;Encourage intake;Care plan reviewd;Supplement provided;Recommend/ordered;Adjusted supplement     Recommended/Ordered Snack;Supplement                Nutrition Prescription     Row Name 04/08/22 1248          Nutrition Prescription PO    PO Prescription Other (comment)  continue current diet as medically appropriate and tolerated     Supplement Nova Renal  Arginaid     Supplement Frequency 3 times a day     Common Modifiers Consistent Carbohydrate;Renal     New PO Prescription Ordered?  No, recommended            Other Orders    Obtain Weight Daily     Obtain Weight Ordered? No, recommended     Supplement Vitamin mineral supplement  renal MVI     Supplement Ordered? No, recommended     Labs Hgb A1c     Labs Ordered? No, recommended     Other Continue to monitor and replace electrolytes PRN                Education/Evaluation     Row Name 04/08/22 1249          Education    Education Will Instruct as appropriate            Monitor/Evaluation    Monitor Per protocol;I&O;PO intake;Supplement intake;Pertinent labs;Weight;Skin status;Symptoms                 Electronically signed by:  Louise Casiano RD  04/08/22 12:50 EDT

## 2022-04-09 LAB
DEPRECATED RDW RBC AUTO: 59.3 FL (ref 37–54)
ERYTHROCYTE [DISTWIDTH] IN BLOOD BY AUTOMATED COUNT: 23.5 % (ref 12.3–15.4)
GLUCOSE BLDC GLUCOMTR-MCNC: 131 MG/DL (ref 70–130)
GLUCOSE BLDC GLUCOMTR-MCNC: 145 MG/DL (ref 70–130)
GLUCOSE BLDC GLUCOMTR-MCNC: 175 MG/DL (ref 70–130)
GLUCOSE BLDC GLUCOMTR-MCNC: 67 MG/DL (ref 70–130)
GLUCOSE BLDC GLUCOMTR-MCNC: 73 MG/DL (ref 70–130)
HCT VFR BLD AUTO: 32.4 % (ref 34–46.6)
HGB BLD-MCNC: 9.8 G/DL (ref 12–15.9)
MCH RBC QN AUTO: 24.3 PG (ref 26.6–33)
MCHC RBC AUTO-ENTMCNC: 30.2 G/DL (ref 31.5–35.7)
MCV RBC AUTO: 80.2 FL (ref 79–97)
PLATELET # BLD AUTO: 72 10*3/MM3 (ref 140–450)
RBC # BLD AUTO: 4.04 10*6/MM3 (ref 3.77–5.28)
WBC NRBC COR # BLD: 6.34 10*3/MM3 (ref 3.4–10.8)

## 2022-04-09 PROCEDURE — 63710000001 INSULIN ASPART PER 5 UNITS: Performed by: FAMILY MEDICINE

## 2022-04-09 PROCEDURE — 63710000001 INSULIN DETEMIR PER 5 UNITS: Performed by: FAMILY MEDICINE

## 2022-04-09 PROCEDURE — 85027 COMPLETE CBC AUTOMATED: CPT | Performed by: INTERNAL MEDICINE

## 2022-04-09 PROCEDURE — 99232 SBSQ HOSP IP/OBS MODERATE 35: CPT | Performed by: INTERNAL MEDICINE

## 2022-04-09 PROCEDURE — 82962 GLUCOSE BLOOD TEST: CPT

## 2022-04-09 RX ADMIN — Medication 1 CAPSULE: at 20:41

## 2022-04-09 RX ADMIN — ESCITALOPRAM OXALATE 10 MG: 10 TABLET ORAL at 09:26

## 2022-04-09 RX ADMIN — Medication 10 ML: at 21:20

## 2022-04-09 RX ADMIN — MUPIROCIN 1 APPLICATION: 2 CREAM TOPICAL at 09:38

## 2022-04-09 RX ADMIN — LIDOCAINE 1 PATCH: 50 PATCH CUTANEOUS at 20:42

## 2022-04-09 RX ADMIN — APIXABAN 2.5 MG: 2.5 TABLET, FILM COATED ORAL at 20:42

## 2022-04-09 RX ADMIN — INSULIN DETEMIR 10 UNITS: 100 INJECTION, SOLUTION SUBCUTANEOUS at 09:51

## 2022-04-09 RX ADMIN — AMOXICILLIN AND CLAVULANATE POTASSIUM 500 MG: 500; 125 TABLET, FILM COATED ORAL at 20:42

## 2022-04-09 RX ADMIN — INSULIN DETEMIR 10 UNITS: 100 INJECTION, SOLUTION SUBCUTANEOUS at 21:40

## 2022-04-09 RX ADMIN — Medication 1 TABLET: at 09:25

## 2022-04-09 RX ADMIN — DOCUSATE SODIUM 100 MG: 100 CAPSULE, LIQUID FILLED ORAL at 20:41

## 2022-04-09 RX ADMIN — PANTOPRAZOLE SODIUM 40 MG: 40 TABLET, DELAYED RELEASE ORAL at 09:25

## 2022-04-09 RX ADMIN — DOCUSATE SODIUM 100 MG: 100 CAPSULE, LIQUID FILLED ORAL at 09:25

## 2022-04-09 RX ADMIN — FERROUS SULFATE TAB EC 324 MG (65 MG FE EQUIVALENT) 324 MG: 324 (65 FE) TABLET DELAYED RESPONSE at 09:24

## 2022-04-09 RX ADMIN — FERROUS SULFATE TAB EC 324 MG (65 MG FE EQUIVALENT) 324 MG: 324 (65 FE) TABLET DELAYED RESPONSE at 18:26

## 2022-04-09 RX ADMIN — Medication 1 CAPSULE: at 09:24

## 2022-04-09 RX ADMIN — SILVER SULFADIAZINE 1 APPLICATION: 10 CREAM TOPICAL at 09:37

## 2022-04-09 RX ADMIN — INSULIN ASPART 3 UNITS: 100 INJECTION, SOLUTION INTRAVENOUS; SUBCUTANEOUS at 11:53

## 2022-04-09 RX ADMIN — ASPIRIN 81 MG: 81 TABLET, CHEWABLE ORAL at 09:23

## 2022-04-09 RX ADMIN — Medication 10 ML: at 09:00

## 2022-04-09 RX ADMIN — KETOCONAZOLE 1 APPLICATION: 20 CREAM TOPICAL at 21:19

## 2022-04-09 RX ADMIN — Medication 10 ML: at 09:27

## 2022-04-09 RX ADMIN — ISOSORBIDE MONONITRATE 30 MG: 30 TABLET, EXTENDED RELEASE ORAL at 09:24

## 2022-04-09 RX ADMIN — AMOXICILLIN AND CLAVULANATE POTASSIUM 500 MG: 500; 125 TABLET, FILM COATED ORAL at 09:25

## 2022-04-09 RX ADMIN — Medication 3 ML: at 21:21

## 2022-04-09 RX ADMIN — SILVER SULFADIAZINE 1 APPLICATION: 10 CREAM TOPICAL at 21:20

## 2022-04-09 RX ADMIN — Medication 3 ML: at 09:00

## 2022-04-09 RX ADMIN — Medication 10 ML: at 20:42

## 2022-04-09 RX ADMIN — MUPIROCIN 1 APPLICATION: 2 CREAM TOPICAL at 21:19

## 2022-04-09 RX ADMIN — METOPROLOL SUCCINATE 125 MG: 25 TABLET, EXTENDED RELEASE ORAL at 09:26

## 2022-04-09 RX ADMIN — TORSEMIDE 100 MG: 100 TABLET ORAL at 09:24

## 2022-04-09 RX ADMIN — LEVOTHYROXINE SODIUM 150 MCG: 150 TABLET ORAL at 09:25

## 2022-04-09 RX ADMIN — MUPIROCIN 1 APPLICATION: 2 CREAM TOPICAL at 16:04

## 2022-04-09 RX ADMIN — KETOCONAZOLE 1 APPLICATION: 20 CREAM TOPICAL at 09:38

## 2022-04-09 RX ADMIN — QUETIAPINE FUMARATE 25 MG: 25 TABLET ORAL at 20:42

## 2022-04-09 NOTE — PROGRESS NOTES
[facility]    Name:  Millicent Mckeon   Age:  61 y.o.  Sex:  female  :  1960  MRN:  [id number]   Visit Number:  [id number]  Admission Date:  3/30/2022  Date Of Service:  22  Primary Care Physician:  Marianela Albright APRN     LOS: 8 days :    Chief Complaint:      Follow-up weakness, renal failure    Subjective:    No acute events overnight. She is eating well. No n/v. She is not willing to have a discussion about going any where else but home when she is discharged from the hospital.     Hospital Course:      The patient is a 61-year-old chronically ill-appearing female with history of atrial fibrillation, chronic systolic congestive heart failure, CKD stage IV, chronic cor pulmonale, morbid obesity, functional quadriplegia who had presented to the emergency room with generalized weakness.  She had apparently been found at home disheveled and with multiple skin sores and wounds.  She had had multiple treatments recently for cellulitis of the lower extremity and hypoglycemia.       Initial work-up was demonstrating bradycardia, hypoglycemia, with creatinine 3.74 and a BUN of 75.  Hemoglobin 10.8.  Chest x-ray show cardiomegaly and bilateral opacity in the lower zones.  Concern for pneumonia and she was given azithromycin and Rocephin.  She did develop some low blood pressures and became hypothermic upon admission.  She was started on midodrine and IV Lasix and had Wilkinson catheter placed.  There was concern for sepsis due to her recent hospitalization she was started on Zosyn and vancomycin.  She had a left internal jugular central venous line placed on 3/31/2022.  She subsequent had improvement in her blood pressures.  Her MRSA swab was negative, vancomycin was discontinued.  She also had some improvement in her urine output.  She was noted to have overall worsening kidney function, discussions had about dialysis.  Patient was seen by palliative care services.   She did elect to be a DNR/DNI.  After discussion with patient's niece, patient is now in agreement with trial of dialysis and will plan on having a tunneled dialysis catheter placed.  This was placed on 4/5/2022 and she started hemodialysis.  Case management consulted to work on outpatient dialysis set up; earliest this would happen is probably Monday. Social situation is difficult; she is adamant about going home but is too weak to get out of bed on her own and it seems that no family members are able to live with her. Adult Protective Services will be contacted.        Review of Systems:     All systems were reviewed and negative except as mentioned in subjective, assessment and plan.    Vital Signs:    Temp:  [97.4 °F (36.3 °C)-97.8 °F (36.6 °C)] 97.8 °F (36.6 °C)  Heart Rate:  [] 100  Resp:  [15-18] 18  BP: (112-127)/(68-86) 112/69    Intake and output:    I/O last 3 completed shifts:  In: 1080 [P.O.:1080]  Out: 425 [Urine:425]  I/O this shift:  In: 480 [P.O.:480]  Out: 150 [Urine:150]    Physical Examination:    General Appearance:  Alert and cooperative. NAD, Chronically ill appearing.   Head:  Atraumatic and normocephalic.   Eyes: Conjunctivae and sclerae normal, no icterus. No pallor.   Throat: No oral lesions, oral mucosa moist.   Neck: Supple, trachea midline, no thyromegaly.   Lungs:   Breath sounds heard bilaterally equally.  No wheezing or crackles. No Pleural rub or bronchial breathing.   Heart:  Normal S1 and S2, no murmur, no gallop, no rub. No JVD.   Abdomen:   Normal bowel sounds, no masses, no organomegaly. Soft, nontender, nondistended, no rebound tenderness.   Extremities: Supple, no edema, no cyanosis, no clubbing.   Skin: No bleeding or rash.   Neurologic: Alert. No facial asymmetry. Moves all four limbs. No tremors.      Laboratory results:    Results from last 7 days   Lab Units 04/07/22  0540 04/06/22  0751 04/05/22  0537 04/04/22  0955 04/03/22  0604   SODIUM mmol/L 135* 137 140 137  141   POTASSIUM mmol/L 3.9 3.6 3.7 4.1 4.5   CHLORIDE mmol/L 100 98 98 95* 97*   CO2 mmol/L 19.8* 22.4 22.5 23.2 21.2*   BUN mg/dL 45* 64* 104* 97* 101*   CREATININE mg/dL 3.02* 3.81* 5.01* 5.03* 4.69*   CALCIUM mg/dL 8.4* 8.2* 7.9* 7.6* 7.9*   BILIRUBIN mg/dL  --   --   --  0.6 0.7   ALK PHOS U/L  --   --   --  225* 250*   ALT (SGPT) U/L  --   --   --  81* 73*   AST (SGOT) U/L  --   --   --  61* 77*   GLUCOSE mg/dL 140* 142* 191* 428* 251*     Results from last 7 days   Lab Units 04/09/22  0541 04/08/22  0530 04/07/22  0540   WBC 10*3/mm3 6.34 6.05 4.82   HEMOGLOBIN g/dL 9.8* 9.9* 10.4*   HEMATOCRIT % 32.4* 32.7* 32.9*   PLATELETS 10*3/mm3 72* 64* 60*     Results from last 7 days   Lab Units 04/03/22  0604   INR  1.84*                 I have reviewed the patient's laboratory results.    Radiology results:    No radiology results from the last 24 hrs  I have reviewed the patient's radiology reports.    Medication Review:     I have reviewed the patient's active and prn medications.     Problem List:      Hypoglycemia    Bilateral edema of lower extremity    Essential hypertension    Acquired hypothyroidism    Type 2 diabetes mellitus with nephropathy (HCC)    Cellulitis of both lower extremities    Anemia due to stage 4 chronic kidney disease (HCC)    Chronic diastolic congestive heart failure (HCC)    Cor pulmonale, chronic (HCC)    Chronic kidney disease, stage IV (severe) (HCC)    Bradycardia      Assessment/Plan:    Social: The patient is adamant about going home but is too weak to get out of bed on her own and it seems that no family members are able to live with her. Will consult case management for assistance with placement. Adult Protective Services will be contacted.     Progressive worsening of CKD a/w volume overload  -HD initiated on 4/5  -torsemide po 100mg qd  -continue clay    Sepsis from b/l LE cellulitis, resolved  -s/p 5 days of zosyn and transitioned to augmentin; today is day 5/5  -blood  cultures collected on 3/30/22 no growth at 5 days    Symptomatic bradycardia, resolved  -continuing her toprol-xl  -holding her amiodarone    DVT Prophylaxis: Eliquis  Code Status: DNR/DNI  Diet: Renal  Discharge Plan: Pending hemodialysis chair which may be available on Monday. Also pending placement. She could potentially be discharged home with home health, a wheelchair and beatrice lift; but only if she had support from family or friends which she doesn't seem to have.    Doc Keith MD  04/09/22  13:57 EDT

## 2022-04-09 NOTE — PLAN OF CARE
"Goal Outcome Evaluation:  Plan of Care Reviewed With: patient, other (see comments) (pt's niece)           Outcome Evaluation: PT treatment was brief this p.m. as pt initially agreed to work with therapist but as therapist began adjusting bed remove pillows to assist pt to EOB pt stated she was too tired to sit up. Therapist tried to encourage pt to try to sit up since pt's primary goal is to go home. Pt states that she would be going home tomorrow. Therapist stated that to go home alone would be quite a challeng to pt since she has yet to be able to move herself independently. Pt stated, \"That it doesn't matter, I have no steps and I have a BSC.\" Pt then covered her head with a sheet and refused to talk or work with therapist. Therapist stated to pt that not talking about her current physical limitations or not trying to work on them would not make them go away. Pt kept repeating, \"I'm going home. If I have to stay here I won't do anything until you send me home.\" At present pt needs full assist of 1-2 PA to be safe with all bed mobility and max assist with ADLs. Nursing informed of pt declining to continue PT at this time. Will plan to try back tomorrow and proceed as pt will permit.  "

## 2022-04-09 NOTE — PROGRESS NOTES
[facility] Progress Note  [facility]. [place]        Patient Name: Millicent Mckeon  : 1960  MRN: [id number]   LOS: 8 days    Patient Care Team:  Marianela Albright APRN as PCP - General (Family Medicine)  Geremias Shepherd MD as Consulting Physician (General Surgery)  Lisa Cardoso MD as Surgeon (General Surgery)    Chief Complaint:    Chief Complaint   Patient presents with   • Weakness - Generalized     Unable to stand     Primary Care Physician:  Marianela Albright APRN  Date of admission: 3/30/2022    Subjective     Interval History:   Events noted from last 24 hours.  She was a lot more awake alert and interactive was able to keep a conversation.  She does not actively participate in physical therapy and has not been able to get out of bed.  She refuses to go to rehab.  Outpatient dialysis schedule is still not available.  Clinically appears to be improving.  Denies any fever or chills.  Review of Systems:   As noted above    Objective     Vitals:   Temp:  [97.4 °F (36.3 °C)-97.7 °F (36.5 °C)] 97.5 °F (36.4 °C)  Heart Rate:  [89-97] 96  Resp:  [15-18] 15  BP: (119-127)/(68-86) 119/69    Intake/Output Summary (Last 24 hours) at 2022 0833  Last data filed at 2022 0500  Gross per 24 hour   Intake 600 ml   Output 300 ml   Net 300 ml       Physical Exam:    General Appearance: alert,  no acute distress   Skin: warm and dry  HEENT: oral mucosa normal, nonicteric sclera  Neck: supple, no JVD  Lungs: Still have decreased breath sounds at both bases.  Heart: RRR, normal S1 and S2  Abdomen: Obese, soft to firm, nontender, nondistended, she does have dependent edema mostly on the back of her body including sacral area.  : no palpable bladder  Extremities: 1 + edema, mostly it is dependent edema, no cyanosis or clubbing  Neuro: Awake and interactive, randomly move extremities.    Scheduled Meds:     Current Facility-Administered Medications   Medication Dose Route  Frequency Provider Last Rate Last Admin   • acetaminophen (TYLENOL) tablet 650 mg  650 mg Oral Q4H PRN Cheyanne Valencia DO   650 mg at 03/31/22 0629    Or   • acetaminophen (TYLENOL) 160 MG/5ML solution 650 mg  650 mg Oral Q4H PRN Cheyanne Valencia DO        Or   • acetaminophen (TYLENOL) suppository 650 mg  650 mg Rectal Q4H PRN Cheyanne Valencia DO       • albumin human 25 % IV SOLN 12.5 g  12.5 g Intravenous PRN Milad Leone MD       • albumin human 25 % IV SOLN 12.5 g  12.5 g Intravenous PRN Milad Leone MD       • amoxicillin-clavulanate (AUGMENTIN) 500-125 MG per tablet 500 mg  1 tablet Oral Q12H Cheyanne Valencia DO   500 mg at 04/08/22 2030   • aspirin chewable tablet 81 mg  81 mg Oral Daily Cheyanne Valencia DO   81 mg at 04/08/22 0839   • b complex-vitamin c-folic acid (NEPHRO-LOIS) tablet 1 tablet  1 tablet Oral Daily Cheyanne Valencia DO   1 tablet at 04/08/22 0838   • dextrose (D50W) (25 g/50 mL) IV injection 25 g  25 g Intravenous Q15 Min PRN Cheyanne Valencia DO       • dextrose (D50W) (25 g/50 mL) IV injection 50 mL  50 mL Intravenous Q1H PRN Cheyanne Valencia DO   50 mL at 03/31/22 0630   • dextrose (GLUTOSE) oral gel 1 tube  1 tube Oral Q15 Min PRN Cheyanne Valencia DO       • docusate sodium (COLACE) capsule 100 mg  100 mg Oral BID Cheyanne Valencia DO   100 mg at 04/08/22 2030   • escitalopram (LEXAPRO) tablet 10 mg  10 mg Oral Daily Milad Leone MD   10 mg at 04/08/22 0838   • ferrous sulfate EC tablet 324 mg  324 mg Oral BID With Meals Cheyanne Valencia DO   324 mg at 04/08/22 1801   • glucagon (human recombinant) (GLUCAGEN DIAGNOSTIC) injection 1 mg  1 mg Subcutaneous PRN Karrick, Cheyanne Syed, DO       • heparin (porcine) injection 1,000 Units  1,000 Units Intracatheter Cheyanne Flores DO       • heparin (porcine) injection 1,000 Units  1,000 Units Intracatheter Milad Bianchi MD       • heparin  (porcine) injection 1,000 Units  1,000 Units Intracatheter PRN Milad Leone MD       • insulin aspart (novoLOG) injection 0-14 Units  0-14 Units Subcutaneous TID AC Cheyanne Valencia DO   5 Units at 04/08/22 1801   • insulin detemir (LEVEMIR) injection 10 Units  10 Units Subcutaneous Q12H Cheyanne Valencia DO   10 Units at 04/08/22 2030   • ipratropium-albuterol (DUO-NEB) nebulizer solution 3 mL  3 mL Nebulization Q4H PRN Cheyanne Valencia DO       • isosorbide mononitrate (IMDUR) 24 hr tablet 30 mg  30 mg Oral Daily Cheyanne Valencia DO   30 mg at 04/08/22 0838   • ketoconazole (NIZORAL) 2 % cream 1 application  1 application Topical BID Cheyanne Valencia DO   1 application at 04/08/22 2030   • lactobacillus acidophilus (RISAQUAD) capsule 1 capsule  1 capsule Oral BID Cheyanne Valencia DO   1 capsule at 04/08/22 2030   • levothyroxine (SYNTHROID, LEVOTHROID) tablet 150 mcg  150 mcg Oral Daily Cheyanne Valencia DO   150 mcg at 04/08/22 0838   • lidocaine (LIDODERM) 5 % 1 patch  1 patch Transdermal Q24H Cheyanne Valencia DO   1 patch at 04/08/22 1955   • metoprolol succinate XL (TOPROL-XL) 24 hr tablet 125 mg  125 mg Oral Q24H Milad Leone MD       • mupirocin (BACTROBAN) 2 % cream 1 application  1 application Topical TID Cheyanne Valencia DO   1 application at 04/08/22 2030   • ondansetron (ZOFRAN) injection 4 mg  4 mg Intravenous Q6H PRN Cheyanne Valencia DO       • pantoprazole (PROTONIX) EC tablet 40 mg  40 mg Oral Daily Cheyanne Valencia DO   40 mg at 04/08/22 0838   • QUEtiapine (SEROquel) tablet 25 mg  25 mg Oral Nightly Milad Leone MD   25 mg at 04/08/22 2030   • silver sulfadiazine (SILVADENE, SSD) 1 % cream 1 application  1 application Topical Q12H Cheyanne Valencia DO   1 application at 04/08/22 2030   • sodium chloride 0.9 % bolus 1,000 mL  1,000 mL Intravenous PRN Milad Leone MD       • sodium chloride 0.9 % bolus 1,000  mL  1,000 mL Intravenous PRN Milad Leone MD       • sodium chloride 0.9 % flush 10 mL  10 mL Intravenous PRN Karrick, Cheyanne Syed, DO       • sodium chloride 0.9 % flush 10 mL  10 mL Intravenous Q12H Karrick, Cheyanne Syed, DO   10 mL at 04/08/22 0839   • sodium chloride 0.9 % flush 10 mL  10 mL Intravenous Q12H Karrick, Cheyanne Syed, DO   10 mL at 04/08/22 0839   • sodium chloride 0.9 % flush 10 mL  10 mL Intravenous Q12H Karrick, Cheyanne Syed, DO   10 mL at 04/08/22 2100   • sodium chloride 0.9 % flush 10 mL  10 mL Intravenous PRN Karrick, Cheyanne Syed, DO       • sodium chloride 0.9 % flush 20 mL  20 mL Intravenous PRN Karrick, Cheyanne Syed, DO       • sodium chloride 0.9 % flush 3 mL  3 mL Intravenous Q12H Karrick, Cheyanne Syed, DO   3 mL at 04/08/22 0840   • sodium chloride 0.9 % flush 3-10 mL  3-10 mL Intravenous PRN Karrick, Cheyanne Syed, DO       • torsemide (DEMADEX) tablet 100 mg  100 mg Oral Daily Milad Leone MD   100 mg at 04/08/22 0838       amoxicillin-clavulanate, 1 tablet, Oral, Q12H  aspirin, 81 mg, Oral, Daily  b complex-vitamin c-folic acid, 1 tablet, Oral, Daily  docusate sodium, 100 mg, Oral, BID  escitalopram, 10 mg, Oral, Daily  ferrous sulfate, 324 mg, Oral, BID With Meals  insulin aspart, 0-14 Units, Subcutaneous, TID AC  insulin detemir, 10 Units, Subcutaneous, Q12H  isosorbide mononitrate, 30 mg, Oral, Daily  ketoconazole, 1 application, Topical, BID  lactobacillus acidophilus, 1 capsule, Oral, BID  levothyroxine, 150 mcg, Oral, Daily  lidocaine, 1 patch, Transdermal, Q24H  metoprolol succinate XL, 125 mg, Oral, Q24H  mupirocin, 1 application, Topical, TID  pantoprazole, 40 mg, Oral, Daily  QUEtiapine, 25 mg, Oral, Nightly  silver sulfadiazine, 1 application, Topical, Q12H  sodium chloride, 10 mL, Intravenous, Q12H  sodium chloride, 10 mL, Intravenous, Q12H  sodium chloride, 10 mL, Intravenous, Q12H  sodium chloride, 3 mL, Intravenous, Q12H  torsemide, 100 mg, Oral,  Daily        IV Meds:        Results Reviewed:   I have personally reviewed the results from the time of this admission to 4/9/2022 08:33 EDT     Results from last 7 days   Lab Units 04/07/22  0540 04/06/22  0751 04/05/22  0537 04/04/22  0955 04/03/22  0604   SODIUM mmol/L 135* 137 140 137 141   POTASSIUM mmol/L 3.9 3.6 3.7 4.1 4.5   CHLORIDE mmol/L 100 98 98 95* 97*   CO2 mmol/L 19.8* 22.4 22.5 23.2 21.2*   BUN mg/dL 45* 64* 104* 97* 101*   CREATININE mg/dL 3.02* 3.81* 5.01* 5.03* 4.69*   CALCIUM mg/dL 8.4* 8.2* 7.9* 7.6* 7.9*   BILIRUBIN mg/dL  --   --   --  0.6 0.7   ALK PHOS U/L  --   --   --  225* 250*   ALT (SGPT) U/L  --   --   --  81* 73*   AST (SGOT) U/L  --   --   --  61* 77*   GLUCOSE mg/dL 140* 142* 191* 428* 251*       Estimated Creatinine Clearance: 27.8 mL/min (A) (by C-G formula based on SCr of 3.02 mg/dL (H)).    Results from last 7 days   Lab Units 04/06/22  0751 04/05/22  0537   PHOSPHORUS mg/dL 4.5 5.3*             Results from last 7 days   Lab Units 04/09/22  0541 04/08/22  0530 04/07/22  0540 04/06/22  0751 04/05/22  0537   WBC 10*3/mm3 6.34 6.05 4.82 4.88 5.14   HEMOGLOBIN g/dL 9.8* 9.9* 10.4* 9.8* 9.9*   PLATELETS 10*3/mm3 72* 64* 60* 55* 60*       Results from last 7 days   Lab Units 04/03/22  0604   INR  1.84*       Brief Urine Lab Results  (Last result in the past 365 days)      Color   Clarity   Blood   Leuk Est   Nitrite   Protein   CREAT   Urine HCG        03/30/22 1705 Yellow   Clear   Trace   Negative   Negative   >=300 mg/dL (3+)                 No results found for: UTPCR    Imaging Results (Last 24 Hours)     ** No results found for the last 24 hours. **              Assessment / Plan     ASSESSMENT:    End-stage renal disease: After denying to have dialysis finally she has agreed, because of multiple issues with the belly peritoneal dialysis catheter was not an option at this point, she ended up with a tunneled dialysis catheter.  We will make arrangements for her outpatient  dialysis, once she is better we will have further discussion with the family if they still are interested and wants to do her home peritoneal dialysis.    Anemia: Hemoglobin appears to be fairly stable, she does have iron deficiency and because of acute infection cannot give any IV iron continue with oral iron and will start BI as an outpatient.    Chronic diastolic congestive heart failure (HCC): We will try to diurese with high-dose diuretics and see if she will respond to it.  Volume status significantly better since on dialysis will take another 2 to 3 L until she gets to euvolemia.    Cor pulmonale, chronic (HCC): Longstanding history of untreated sleep apnea might be contributing.    Hypoglycemia: Oral hypoglycemic agents with prolonged effect from renal failure likely the cause.    Bilateral edema of lower extremity: She does not have much lower extremity edema most of the edema is in the abdominal and chest area appears to be having generalized anasarca.    Essential hypertension: Blood pressure is on the low side we will hold off all blood pressure medications we will go ahead and start her on midodrine to maintain blood pressure while aggressively diuresing her.  I will go ahead and stop the hydralazine today and optimize her volume status and continue with the metoprolol.    Acquired hypothyroidism: Continue home Synthroid dose.    Type 2 diabetes mellitus with nephropathy (HCC): As per hospitalist service.    Cellulitis of both lower extremities: IV antibiotics have been started.    Bradycardia: It has resolved continue to increase beta-blocker for blood pressure control.      PLAN:  Her outpatient schedule is going to.  Tuesday Thursday Saturday, still do not have exact time.  Continue with torsemide 100 mg a day.  Likely will keep the blood pressure more stable as well as continue to improve the volume status as well.  It does appear that Seroquel and Lexapro has helped her.  Once all the arrangements  are made for her transportation and outpatient dialysis she will be okay to go home.  It was brought to my attention that she does not actively participate in physical therapy cannot get out of bed by herself.  She will need Bunny lift and a wheelchair transportation as well as wheelchair to get to and from dialysis.  Details were discussed with the patient.     Details were also discussed with the hospitalist service as well as nursing staff.  Continue with rest of the current treatment plan, and monitor with surveillance labs.  Further recommendations will depend on clinical course of the patient during the current hospitalization.     Thank you for involving us in the care of Millicent Mckeon.  Please feel free to call with any questions.    Milad Leone MD  04/09/22  08:33 EDT       [facility]  [phone]      Much of this encounter note is an electronic transcription/translation of spoken language to printed text. The electronic translation of spoken language may permit erroneous, or at times, nonsensical words or phrases to be inadvertently transcribed; Although I have reviewed the note for such errors, some may still exist.

## 2022-04-09 NOTE — THERAPY TREATMENT NOTE
Patient Name: Millicent Mckeon  : 1960    MRN: [id number]                              Today's Date: 2022       Admit Date: 3/30/2022    Visit Dx:     ICD-10-CM ICD-9-CM   1. Hypoglycemia  E16.2 251.2   2. Bradycardia  R00.1 427.89   3. Failure to thrive in adult  R62.7 783.7   4. Pneumonia of right lung due to infectious organism, unspecified part of lung  J18.9 483.8     Patient Active Problem List   Diagnosis   • Altered mental status   • Bilateral edema of lower extremity   • Cellulitis of lower extremity   • Acute on chronic renal failure (HCC)   • GERD (gastroesophageal reflux disease)   • Hyperkalemia   • Essential hypertension   • Acquired hypothyroidism   • Type 2 diabetes mellitus with nephropathy ([facility])   • Vitamin B12 deficiency   • Cellulitis of both lower extremities   • Anemia due to stage 4 chronic kidney disease ([facility])   • Acute renal failure (ARF) ([facility])   • Hyperkalemia   • Impaired functional mobility and activity tolerance   • Chronic diastolic congestive heart failure ([facility])   • Paroxysmal atrial fibrillation with rapid ventricular response ([facility])   • Pulmonary hypertension ([facility])   • Cor pulmonale, chronic ([facility])   • Chronic venous hypertension involving both sides   • Lymphedema of both lower extremities   • Obesity, morbid, BMI 50 or higher ([facility])   • A-fib ([facility])   • CKD (chronic kidney disease) stage 3, GFR 30-59 ml/min ([facility])   • Acute bronchitis due to other specified organisms   • Tachycardia-bradycardia syndrome ([facility])   • Pressure ulcer of right heel, unstageable ([facility])   • Mucopurulent chronic bronchitis ([facility])   • Acute pulmonary edema ([facility])   • Thrombocytopenia, unspecified ([facility])   • Chronic kidney disease, stage IV (severe) ([facility])   • Hypoglycemia   • Bradycardia     Past Medical History:   Diagnosis Date   • A-fib ([facility])    • Anemia 10/2/2018   • Diabetes mellitus ([facility])    • Disease of thyroid gland    • GERD (gastroesophageal reflux disease)    • Gout    • History of transfusion   "  • Hypertension      Past Surgical History:   Procedure Laterality Date   • EYE SURGERY     • INCISION AND DRAINAGE LEG Right 1/14/2019    Procedure: Right heel incision and drainage with graft application;  Surgeon: Ar Rueda DPM;  Location: [facility] OR;  Service: Podiatry   • INSERTION HEMODIALYSIS CATHETER N/A 4/5/2022    Procedure: HEMODIALYSIS CATHETER INSERTION;  Surgeon: Jean Carlos Guadalupe MD;  Location: [facility] OR;  Service: General;  Laterality: N/A;   • LEG SURGERY        General Information    No documentation.                Mobility    No documentation.                Obj/Interventions    No documentation.                Goals/Plan    No documentation.                Clinical Impression     Row Name 04/09/22 1328          Pain    Posttreatment Pain Rating 0/10 - no pain  -TW     Row Name 04/09/22 1328          Plan of Care Review    Outcome Evaluation PT treatment was brief this p.m. as pt initially agreed to work with therapist but as therapist began adjusting bed remove pillows to assist pt to EOB pt stated she was too tired to sit up. Therapist tried to encourage pt to try to sit up since pt's primary goal is to go home. Pt states that she would be going home tomorrow. Therapist stated that to go home alone would be quite a challeng to pt since she has yet to be able to move herself independently. Pt stated, \"That it doesn't matter, I have no steps and I have a BSC.\" Pt then covered her head with a sheet and refused to talk or work with therapist. Therapist stated to pt that not talking about her current physical limitations or not trying to work on them would not make them go away. Pt kept repeating, \"I'm going home. If I have to stay here I won't do anything until you send me home.\" At present pt needs full assist of 1-2 PA to be safe with all bed mobility and max assist with ADLs. Nursing informed of pt declining to continue PT at this time. Will plan to try back tomorrow and proceed as pt " will permit.  -TW     Row Name 04/09/22 1328          Vital Signs    Pre Patient Position Supine  -TW     Intra Patient Position Supine  -TW     Post Patient Position Supine  -TW     Row Name 04/09/22 1328          Positioning and Restraints    Pre-Treatment Position in bed  -TW     Post Treatment Position bed  -TW     In Bed supine;call light within reach;encouraged to call for assist;notified nsg  -           User Key  (r) = Recorded By, (t) = Taken By, (c) = Cosigned By    Initials Name Provider Type    Xochitl Barakat PT Physical Therapist               Outcome Measures     Row Name 04/09/22 1328 04/09/22 0835       How much help from another person do you currently need...    Turning from your back to your side while in flat bed without using bedrails? 3  -TW 3  -MT    Moving from lying on back to sitting on the side of a flat bed without bedrails? 2  -TW 2  -MT    Moving to and from a bed to a chair (including a wheelchair)? 1  -TW 1  -MT    Standing up from a chair using your arms (e.g., wheelchair, bedside chair)? 1  -TW 1  -MT    Climbing 3-5 steps with a railing? 1  -TW 1  -MT    To walk in hospital room? 1  -TW 1  -MT    AM-PAC 6 Clicks Score (PT) 9  -TW 9  -MT    Row Name 04/09/22 1328          Functional Assessment    Outcome Measure Options AM-PAC 6 Clicks Basic Mobility (PT)  -TW           User Key  (r) = Recorded By, (t) = Taken By, (c) = Cosigned By    Initials Name Provider Type    Krys Gomez RN Registered Nurse    Xochitl Barakat PT Physical Therapist                             Physical Therapy Education                 Title: PT OT SLP Therapies (In Progress)     Topic: Physical Therapy (In Progress)     Point: Mobility training (In Progress)     Learning Progress Summary           Patient Refuses, E, NL by TW at 4/9/2022 1328    Comment: Pt education about pt's current physical limitations and ways to address these limitations. Pt declined to sit on EOB and then declined to  "particpate further in today's treatment after reasons why it would not be safe to be at home alone presented to pt.      Show all documentation for this point (1)                 Point: Home exercise program (Done)     Learning Progress Summary           Patient Acceptance, E,D, DU,NR by TW at 4/3/2022 1011    Comment: Pt education for improving bed mobility technique as well as LE ther ex technique.                   Point: Body mechanics (Done)     Learning Progress Summary           Patient Acceptance, E,TB, VU by  at 4/6/2022 1115    Comment: Encouraged upright standing, as she stands with roughly 70 degrees of hip flexion.                   Point: Precautions (Not Started)     Learner Progress:  Not documented in this visit.                      User Key     Initials Effective Dates Name Provider Type Discipline     03/23/22 -  Maribel Dorman, PT Physical Therapist PT    ROBY 06/16/21 -  Xochitl Bradford, PT Physical Therapist PT              PT Recommendation and Plan  Planned Therapy Interventions (PT): balance training, bed mobility training, gait training, patient/family education, transfer training, strengthening  Plan of Care Reviewed With: patient, other (see comments) (pt's niece)  Outcome Evaluation: PT treatment was brief this p.m. as pt initially agreed to work with therapist but as therapist began adjusting bed remove pillows to assist pt to EOB pt stated she was too tired to sit up. Therapist tried to encourage pt to try to sit up since pt's primary goal is to go home. Pt states that she would be going home tomorrow. Therapist stated that to go home alone would be quite a challeng to pt since she has yet to be able to move herself independently. Pt stated, \"That it doesn't matter, I have no steps and I have a BSC.\" Pt then covered her head with a sheet and refused to talk or work with therapist. Therapist stated to pt that not talking about her current physical limitations or not trying to work on " "them would not make them go away. Pt kept repeating, \"I'm going home. If I have to stay here I won't do anything until you send me home.\" At present pt needs full assist of 1-2 PA to be safe with all bed mobility and max assist with ADLs. Nursing informed of pt declining to continue PT at this time. Will plan to try back tomorrow and proceed as pt will permit.     Time Calculation:    PT Charges     Row Name 04/09/22 1328             Time Calculation    Start Time 1321  -TW      Stop Time 1328  -TW      Time Calculation (min) 7 min  -TW      PT Received On 04/09/22  -TW              Timed Charges    74816 - PT Therapeutic Activity Minutes 7  -TW              Total Minutes    Timed Charges Total Minutes 7  -TW       Total Minutes 7  -TW            User Key  (r) = Recorded By, (t) = Taken By, (c) = Cosigned By    Initials Name Provider Type    Xochitl Barakat, PT Physical Therapist                  PT G-Codes  Outcome Measure Options: AM-PAC 6 Clicks Basic Mobility (PT)  AM-PAC 6 Clicks Score (PT): 9  AM-PAC 6 Clicks Score (OT): 15    Xochitl Bradford PT  4/9/2022    "

## 2022-04-09 NOTE — PLAN OF CARE
Goal Outcome Evaluation:  Plan of Care Reviewed With: patient        Progress: improving   Millicent is A+O x 4. She is adamant about going home as soon as possible. CM arranging outpatient dialysis and transport. Discharge will depend on arrangements above. Wilkinson catheter removed per order. She passed small amount of urine post removal which has been her baseline. Millicent remains in A-Flutter on cardiac telemetry and SpO2 remains above 90% on room air. Wound care and skin care provided. Glucose monitoring continues with interventions as needed.

## 2022-04-09 NOTE — PLAN OF CARE
Goal Outcome Evaluation:  Plan of Care Reviewed With: patient        Progress: no change  Outcome Evaluation: pleasant patient with no complaint of pain at this time-medications given per Dr. Valencia and Dr. Leone's orders-monitor blood sugar with coverage per protocol-monitor labs and continue to monitor patient-bed alarm on at all times-wound care per order

## 2022-04-10 LAB
ALBUMIN SERPL-MCNC: 3.5 G/DL (ref 3.5–5.2)
ANION GAP SERPL CALCULATED.3IONS-SCNC: 14.6 MMOL/L (ref 5–15)
BUN SERPL-MCNC: 45 MG/DL (ref 8–23)
BUN/CREAT SERPL: 13.6 (ref 7–25)
CALCIUM SPEC-SCNC: 8.8 MG/DL (ref 8.6–10.5)
CHLORIDE SERPL-SCNC: 103 MMOL/L (ref 98–107)
CO2 SERPL-SCNC: 22.4 MMOL/L (ref 22–29)
CREAT SERPL-MCNC: 3.32 MG/DL (ref 0.57–1)
DEPRECATED RDW RBC AUTO: 61.6 FL (ref 37–54)
EGFRCR SERPLBLD CKD-EPI 2021: 15 ML/MIN/1.73
ERYTHROCYTE [DISTWIDTH] IN BLOOD BY AUTOMATED COUNT: 23.8 % (ref 12.3–15.4)
GLUCOSE BLDC GLUCOMTR-MCNC: 128 MG/DL (ref 70–130)
GLUCOSE BLDC GLUCOMTR-MCNC: 132 MG/DL (ref 70–130)
GLUCOSE BLDC GLUCOMTR-MCNC: 135 MG/DL (ref 70–130)
GLUCOSE BLDC GLUCOMTR-MCNC: 143 MG/DL (ref 70–130)
GLUCOSE SERPL-MCNC: 139 MG/DL (ref 65–99)
HCT VFR BLD AUTO: 33.2 % (ref 34–46.6)
HGB BLD-MCNC: 9.9 G/DL (ref 12–15.9)
MCH RBC QN AUTO: 23.9 PG (ref 26.6–33)
MCHC RBC AUTO-ENTMCNC: 29.8 G/DL (ref 31.5–35.7)
MCV RBC AUTO: 80 FL (ref 79–97)
PHOSPHATE SERPL-MCNC: 4.3 MG/DL (ref 2.5–4.5)
PLATELET # BLD AUTO: 100 10*3/MM3 (ref 140–450)
PMV BLD AUTO: ABNORMAL FL
POTASSIUM SERPL-SCNC: 4.3 MMOL/L (ref 3.5–5.2)
RBC # BLD AUTO: 4.15 10*6/MM3 (ref 3.77–5.28)
SODIUM SERPL-SCNC: 140 MMOL/L (ref 136–145)
WBC NRBC COR # BLD: 6.44 10*3/MM3 (ref 3.4–10.8)

## 2022-04-10 PROCEDURE — 85027 COMPLETE CBC AUTOMATED: CPT | Performed by: INTERNAL MEDICINE

## 2022-04-10 PROCEDURE — 99231 SBSQ HOSP IP/OBS SF/LOW 25: CPT | Performed by: FAMILY MEDICINE

## 2022-04-10 PROCEDURE — 80069 RENAL FUNCTION PANEL: CPT | Performed by: INTERNAL MEDICINE

## 2022-04-10 PROCEDURE — 63710000001 INSULIN DETEMIR PER 5 UNITS: Performed by: FAMILY MEDICINE

## 2022-04-10 PROCEDURE — 82962 GLUCOSE BLOOD TEST: CPT

## 2022-04-10 RX ADMIN — INSULIN DETEMIR 10 UNITS: 100 INJECTION, SOLUTION SUBCUTANEOUS at 21:02

## 2022-04-10 RX ADMIN — SILVER SULFADIAZINE 1 APPLICATION: 10 CREAM TOPICAL at 08:19

## 2022-04-10 RX ADMIN — METOPROLOL SUCCINATE 125 MG: 25 TABLET, EXTENDED RELEASE ORAL at 08:20

## 2022-04-10 RX ADMIN — FERROUS SULFATE TAB EC 324 MG (65 MG FE EQUIVALENT) 324 MG: 324 (65 FE) TABLET DELAYED RESPONSE at 17:35

## 2022-04-10 RX ADMIN — PANTOPRAZOLE SODIUM 40 MG: 40 TABLET, DELAYED RELEASE ORAL at 08:20

## 2022-04-10 RX ADMIN — TORSEMIDE 100 MG: 100 TABLET ORAL at 08:19

## 2022-04-10 RX ADMIN — ASPIRIN 81 MG: 81 TABLET, CHEWABLE ORAL at 08:19

## 2022-04-10 RX ADMIN — QUETIAPINE FUMARATE 25 MG: 25 TABLET ORAL at 20:53

## 2022-04-10 RX ADMIN — MUPIROCIN 1 APPLICATION: 2 CREAM TOPICAL at 16:36

## 2022-04-10 RX ADMIN — APIXABAN 2.5 MG: 2.5 TABLET, FILM COATED ORAL at 08:21

## 2022-04-10 RX ADMIN — KETOCONAZOLE 1 APPLICATION: 20 CREAM TOPICAL at 08:19

## 2022-04-10 RX ADMIN — ESCITALOPRAM OXALATE 10 MG: 10 TABLET ORAL at 08:21

## 2022-04-10 RX ADMIN — Medication 10 ML: at 20:53

## 2022-04-10 RX ADMIN — Medication 10 ML: at 08:24

## 2022-04-10 RX ADMIN — FERROUS SULFATE TAB EC 324 MG (65 MG FE EQUIVALENT) 324 MG: 324 (65 FE) TABLET DELAYED RESPONSE at 08:21

## 2022-04-10 RX ADMIN — LIDOCAINE 1 PATCH: 50 PATCH CUTANEOUS at 20:54

## 2022-04-10 RX ADMIN — ISOSORBIDE MONONITRATE 30 MG: 30 TABLET, EXTENDED RELEASE ORAL at 08:21

## 2022-04-10 RX ADMIN — DOCUSATE SODIUM 100 MG: 100 CAPSULE, LIQUID FILLED ORAL at 08:19

## 2022-04-10 RX ADMIN — Medication 1 CAPSULE: at 08:19

## 2022-04-10 RX ADMIN — DOCUSATE SODIUM 100 MG: 100 CAPSULE, LIQUID FILLED ORAL at 20:53

## 2022-04-10 RX ADMIN — LEVOTHYROXINE SODIUM 150 MCG: 150 TABLET ORAL at 08:20

## 2022-04-10 RX ADMIN — MUPIROCIN 1 APPLICATION: 2 CREAM TOPICAL at 08:23

## 2022-04-10 RX ADMIN — Medication 1 TABLET: at 08:21

## 2022-04-10 RX ADMIN — APIXABAN 2.5 MG: 2.5 TABLET, FILM COATED ORAL at 20:53

## 2022-04-10 RX ADMIN — Medication 1 CAPSULE: at 20:53

## 2022-04-10 NOTE — THERAPY TREATMENT NOTE
"Pt refused PT today when PTA suggested just getting up to chair to show she was ready to return to home pt stated\"I'm tired of that being throwed up in my face and I just want to kick their a_ _. PTA commented What? And pt stated the nurses , PTA replied it wasn't the nurses that wanted me to get you up the doctors want me to get you up to chair today if your able, pt replied I have a headache and I just want you to leave me alone. PTA departed at that point and wished pt a good day. PT to f/u with pt at later date    "

## 2022-04-10 NOTE — PROGRESS NOTES
[facility]    Name:  Millicent Mckeon   Age:  61 y.o.  Sex:  female  :  1960  MRN:  [id number]   Visit Number:  [id number]  Admission Date:  3/30/2022  Date Of Service:  04/10/22  Primary Care Physician:  Marianela Albright APRN     LOS: 9 days :    Chief Complaint:      Follow-up weakness, renal failure    Subjective:    Patient was seen again this morning.  She continues to reiterate that she is ready to go home.  I discussed that she has not been out of the bed any, has not been participating with physical therapy, she states she can do all this at home.  Denies any shortness of breath, fevers or chills.    Hospital Course:    The patient is a 61-year-old chronically ill-appearing female with history of atrial fibrillation, chronic systolic congestive heart failure, CKD stage IV, chronic cor pulmonale, morbid obesity, functional quadriplegia who had presented to the emergency room with generalized weakness.  She had apparently been found at home disheveled and multiple skin sores and wounds.  She had had multiple treatments lately for cellulitis of the lower extremity and hypoglycemia.  Apparently lives at home per family report.    Initial work-up was demonstrating bradycardia, hypoglycemia, with creatinine 3.74 and a BUN of 75.  Hemoglobin 10.8.  Chest x-ray show cardiomegaly and bilateral opacity in the lower zones.  Concern for pneumonia and she was given azithromycin and Rocephin.  She did develop some low blood pressures and became hypothermic upon admission.  She was started on midodrine and IV Lasix and had Wilkinson catheter placed.  There was concern for sepsis due to her recent hospitalization she was started on Zosyn and vancomycin.  She had a left internal jugular central venous line placed on 3/31/2022.  She subsequent had improvement in her blood pressures.  Her MRSA swab was negative, vancomycin was discontinued.  She also had some improvement in her  urine output.  She was noted to have overall worsening kidney function, discussions had about dialysis.  Patient was seen by palliative care services.  She did elect to be a DNR/DNI.  After discussion with patient's niece, patient is now in agreement with trial of dialysis and will plan on having a tunneled dialysis catheter placed.  This was placed on 4/5/2022 and she started hemodialysis.  Case management consulted to work on outpatient dialysis set up.    Review of Systems:     All systems were reviewed and negative except as mentioned in subjective, assessment and plan.    Vital Signs:    Temp:  [97.3 °F (36.3 °C)-98.2 °F (36.8 °C)] 97.4 °F (36.3 °C)  Heart Rate:  [65-92] 90  Resp:  [16-17] 16  BP: (106-142)/(62-89) 142/81    Intake and output:    I/O last 3 completed shifts:  In: 960 [P.O.:960]  Out: 300 [Urine:300]  I/O this shift:  In: 240 [P.O.:240]  Out: -     Physical Examination:    General Appearance:  Alert and cooperative.  No acute distress.  Chronically ill-appearing   Head:  Atraumatic and normocephalic.   Eyes: Conjunctivae and sclerae normal, no icterus. No pallor.   Throat: No oral lesions, no thrush, oral mucosa moist.   Neck: Supple, trachea midline, no thyromegaly.   Lungs:    Breath sounds diminished.   Heart:  Normal S1 and S2, no murmur, no gallop, no rub. No JVD.   Abdomen:   Normal bowel sounds, no masses, no organomegaly. Soft, nontender, nondistended, no rebound tenderness.  Obese, Wilkinson catheter noted   Extremities: Supple, 2+ lower edema unchanged, no cyanosis, no clubbing.  Venous insufficiency noted.   Skin:  Patient has multiple areas of erythema of the lower extremities, scabs, ulcerations of the skin.   Neurologic: Alert and oriented x 3. No facial asymmetry. Moves all four limbs. No tremors.       Laboratory results:    Results from last 7 days   Lab Units 04/10/22  0541 04/07/22  0540 04/06/22  0751 04/05/22  0537 04/04/22  0955   SODIUM mmol/L 140 135* 137   < > 137    POTASSIUM mmol/L 4.3 3.9 3.6   < > 4.1   CHLORIDE mmol/L 103 100 98   < > 95*   CO2 mmol/L 22.4 19.8* 22.4   < > 23.2   BUN mg/dL 45* 45* 64*   < > 97*   CREATININE mg/dL 3.32* 3.02* 3.81*   < > 5.03*   CALCIUM mg/dL 8.8 8.4* 8.2*   < > 7.6*   BILIRUBIN mg/dL  --   --   --   --  0.6   ALK PHOS U/L  --   --   --   --  225*   ALT (SGPT) U/L  --   --   --   --  81*   AST (SGOT) U/L  --   --   --   --  61*   GLUCOSE mg/dL 139* 140* 142*   < > 428*    < > = values in this interval not displayed.     Results from last 7 days   Lab Units 04/10/22  0541 04/09/22  0541 04/08/22  0530   WBC 10*3/mm3 6.44 6.34 6.05   HEMOGLOBIN g/dL 9.9* 9.8* 9.9*   HEMATOCRIT % 33.2* 32.4* 32.7*   PLATELETS 10*3/mm3 100* 72* 64*                     I have reviewed the patient's laboratory results.    Radiology results:    No radiology results from the last 24 hrs  I have reviewed the patient's radiology reports.    Medication Review:     I have reviewed the patient's active and prn medications.     Problem List:      Hypoglycemia    Bilateral edema of lower extremity    Essential hypertension    Acquired hypothyroidism    Type 2 diabetes mellitus with nephropathy (HCC)    Cellulitis of both lower extremities    Anemia due to stage 4 chronic kidney disease (HCC)    Chronic diastolic congestive heart failure (HCC)    Cor pulmonale, chronic (HCC)    Chronic kidney disease, stage IV (severe) (HCC)    Bradycardia      Assessment:    1. Sepsis with generalized weakness, hypothermia and hypotension secondary to #2,POA, resolved.  2. Bilateral lower extremity cellulitis, POA, improving.  3. Acute hypoglycemia, POA, resolved.  4. Severe bradycardia possibly related to medications in the setting of worsening renal failure, resolved.  5. Progressive worsening of chronic kidney disease stage IV with volume overload, POA, improving.  6. Chronic venous stasis of the lower extremities with multiple skin ulcers, POA..  7. Chronic systolic heart failure  with ejection fraction of 35%.  8. Paroxysmal atrial fibrillation on apixaban.  9. Chronic cor pulmonale.  10. Chronic hypoxic respiratory failure on home oxygen.  11. Diabetes mellitus type 2 with nephropathy.  12. Morbid obesity with a BMI of 57.  13. Anemia of chronic kidney disease.  14. Functional quadriplegia.  15. Acquired hypothyroidism.  16. Sacral deep tissue injury present on admission.      Plan:    Sepsis/hypothermia/hypotension.  Initially on empiric antibiotics with Zosyn, switched to Augmentin and completed treatment.     Generalized weakness/hypoglycemia/bradycardia.    Likely multifactorial in setting of severe multiple comorbidities, functional quadriplegia, worsening renal and congestive heart failure noted.  Her Toprol-XL was restarted.       Progressive worsening of chronic kidney disease stage IV.  Patient has been receiving hemodialysis after having a tunneled dialysis catheter placed.  Waiting to hear back for outpatient dialysis set up.     Diabetes mellitus type 2.  A1c of 8.5.  She continues on Levemir 20 units twice a day with aggressive SSI protocol.    Continue with hemodialysis.  Case management consulted, waiting on chair time and outpatient dialysis set up.  Once arranged, patient can likely be discharged home, as patient refuses rehab placement or SNF.    DVT Prophylaxis: Eliquis is on hold.  Code Status: DNR/DNI  Diet: Renal  Discharge Plan: ADI Valencia DO  04/10/22  14:23 EDT    Dictated utilizing Dragon dictation.

## 2022-04-10 NOTE — PLAN OF CARE
Goal Outcome Evaluation:  Plan of Care Reviewed With: patient   Refusing to eat.  Very unmotivated in working with therapy.  Dressings changed per order.  Vitals unremarkable.  Blood sugar has been stable.

## 2022-04-10 NOTE — PROGRESS NOTES
[facility] Progress Note  [facility]. [place]        Patient Name: Millicent Mckeon  : 1960  MRN: [id number]   LOS: 9 days    Patient Care Team:  Marianela Albright APRN as PCP - General (Family Medicine)  Geremias Shepherd MD as Consulting Physician (General Surgery)  Lisa Cardoso MD as Surgeon (General Surgery)    Chief Complaint:    Chief Complaint   Patient presents with   • Weakness - Generalized     Unable to stand     Primary Care Physician:  Marianela Albright APRN  Date of admission: 3/30/2022    Subjective     Interval History:   Events noted from last 24 hours.  She drowsy and sleepy this morning.  Easily arousable she denies having any chest pain or shortness of breath.  Denies any fever or chills.  She does feel that things are better still not actively participating in physical therapy.  Outpatient dialysis schedule is still not available.  Clinically appears to be improving.    Review of Systems:   As noted above    Objective     Vitals:   Temp:  [97.3 °F (36.3 °C)-98.2 °F (36.8 °C)] 98.2 °F (36.8 °C)  Heart Rate:  [] 78  Resp:  [16-18] 16  BP: (106-133)/(62-87) 122/62    Intake/Output Summary (Last 24 hours) at 4/10/2022 0741  Last data filed at 2022 1700  Gross per 24 hour   Intake 840 ml   Output 200 ml   Net 640 ml       Physical Exam:    General Appearance: alert,  no acute distress   Skin: warm and dry  HEENT: oral mucosa normal, nonicteric sclera  Neck: supple, no JVD  Lungs: Still have decreased breath sounds at both bases.  Heart: RRR, normal S1 and S2  Abdomen: Obese, soft to firm, nontender, nondistended, she does have dependent edema mostly on the back of her body including sacral area.  : no palpable bladder  Extremities: 1 + edema, mostly it is dependent edema, no cyanosis or clubbing  Neuro: Awake and interactive, randomly move extremities.    Scheduled Meds:     Current Facility-Administered Medications   Medication Dose Route  Frequency Provider Last Rate Last Admin   • acetaminophen (TYLENOL) tablet 650 mg  650 mg Oral Q4H PRN Cheyanne Valencia DO   650 mg at 03/31/22 0629    Or   • acetaminophen (TYLENOL) 160 MG/5ML solution 650 mg  650 mg Oral Q4H PRN Cheyanne Valencia DO        Or   • acetaminophen (TYLENOL) suppository 650 mg  650 mg Rectal Q4H PRN Cheyanne Valencia DO       • amoxicillin-clavulanate (AUGMENTIN) 500-125 MG per tablet 500 mg  1 tablet Oral Q12H Cheyanne Valencia DO   500 mg at 04/09/22 2042   • apixaban (ELIQUIS) tablet 2.5 mg  2.5 mg Oral Q12H Doc Keith MD   2.5 mg at 04/09/22 2042   • aspirin chewable tablet 81 mg  81 mg Oral Daily Cheyanne Valencia DO   81 mg at 04/09/22 0923   • b complex-vitamin c-folic acid (NEPHRO-LOIS) tablet 1 tablet  1 tablet Oral Daily Cheyanne Valencia DO   1 tablet at 04/09/22 0925   • dextrose (D50W) (25 g/50 mL) IV injection 25 g  25 g Intravenous Q15 Min PRN Cheyanne Valencia DO       • dextrose (D50W) (25 g/50 mL) IV injection 50 mL  50 mL Intravenous Q1H PRN Cheyanne Valencia DO   50 mL at 03/31/22 0630   • dextrose (GLUTOSE) oral gel 1 tube  1 tube Oral Q15 Min PRN Cheyanne Valencia DO       • docusate sodium (COLACE) capsule 100 mg  100 mg Oral BID Cheyanne Valencia DO   100 mg at 04/09/22 2041   • escitalopram (LEXAPRO) tablet 10 mg  10 mg Oral Daily Milad Leone MD   10 mg at 04/09/22 0926   • ferrous sulfate EC tablet 324 mg  324 mg Oral BID With Meals Cheyanne Valencia DO   324 mg at 04/09/22 1826   • glucagon (human recombinant) (GLUCAGEN DIAGNOSTIC) injection 1 mg  1 mg Subcutaneous PRN Cheyanne Valencia DO       • heparin (porcine) injection 1,000 Units  1,000 Units Intracatheter PRN Cheyanne Valencia DO       • heparin (porcine) injection 1,000 Units  1,000 Units Intracatheter Milad Bianchi MD       • heparin (porcine) injection 1,000 Units  1,000 Units Intracatheter PRN Milad Leone,  MD       • insulin aspart (novoLOG) injection 0-14 Units  0-14 Units Subcutaneous TID AC Cheyanne Valencia DO   3 Units at 04/09/22 1153   • insulin detemir (LEVEMIR) injection 10 Units  10 Units Subcutaneous Q12H Cheyanne Valencia DO   10 Units at 04/09/22 2140   • ipratropium-albuterol (DUO-NEB) nebulizer solution 3 mL  3 mL Nebulization Q4H PRN Cheyanne Valencia DO       • isosorbide mononitrate (IMDUR) 24 hr tablet 30 mg  30 mg Oral Daily Cheyanne Valencia DO   30 mg at 04/09/22 0924   • ketoconazole (NIZORAL) 2 % cream 1 application  1 application Topical BID Cheyanne Valencia DO   1 application at 04/09/22 2119   • lactobacillus acidophilus (RISAQUAD) capsule 1 capsule  1 capsule Oral BID Cheyanne Valencia DO   1 capsule at 04/09/22 2041   • levothyroxine (SYNTHROID, LEVOTHROID) tablet 150 mcg  150 mcg Oral Daily Cheyanne Valencia DO   150 mcg at 04/09/22 0925   • lidocaine (LIDODERM) 5 % 1 patch  1 patch Transdermal Q24H Cheyanne Valencia DO   1 patch at 04/09/22 2042   • metoprolol succinate XL (TOPROL-XL) 24 hr tablet 125 mg  125 mg Oral Q24H Milad Leone MD   125 mg at 04/09/22 0926   • mupirocin (BACTROBAN) 2 % cream 1 application  1 application Topical TID Cheyanne Valencia DO   1 application at 04/09/22 2119   • ondansetron (ZOFRAN) injection 4 mg  4 mg Intravenous Q6H PRN Cheyanne Valencia DO       • pantoprazole (PROTONIX) EC tablet 40 mg  40 mg Oral Daily Cheyanne Valencia DO   40 mg at 04/09/22 0925   • QUEtiapine (SEROquel) tablet 25 mg  25 mg Oral Nightly Milad Leone MD   25 mg at 04/09/22 2042   • silver sulfadiazine (SILVADENE, SSD) 1 % cream 1 application  1 application Topical Q12H Cheyanne Valencia,    1 application at 04/09/22 2120   • sodium chloride 0.9 % flush 10 mL  10 mL Intravenous PRN Cheyanne Valencia,        • sodium chloride 0.9 % flush 10 mL  10 mL Intravenous Q12H Cheyanne Valencia, DO    10 mL at 04/09/22 2120   • sodium chloride 0.9 % flush 10 mL  10 mL Intravenous Q12H Karrick, Cheyanne Syed, DO   10 mL at 04/09/22 2120   • sodium chloride 0.9 % flush 10 mL  10 mL Intravenous Q12H Karrick, Cheyanne Syed, DO   10 mL at 04/09/22 2042   • sodium chloride 0.9 % flush 10 mL  10 mL Intravenous PRN Karrick, Cheyanne Syed, DO       • sodium chloride 0.9 % flush 20 mL  20 mL Intravenous PRN Karrick, Cheyanne Syed, DO       • sodium chloride 0.9 % flush 3 mL  3 mL Intravenous Q12H Karrick, Cheyanne Syed, DO   3 mL at 04/09/22 2121   • sodium chloride 0.9 % flush 3-10 mL  3-10 mL Intravenous PRN Karrick, Cheyanne Syed, DO       • torsemide (DEMADEX) tablet 100 mg  100 mg Oral Daily Milad Leone MD   100 mg at 04/09/22 0924       amoxicillin-clavulanate, 1 tablet, Oral, Q12H  apixaban, 2.5 mg, Oral, Q12H  aspirin, 81 mg, Oral, Daily  b complex-vitamin c-folic acid, 1 tablet, Oral, Daily  docusate sodium, 100 mg, Oral, BID  escitalopram, 10 mg, Oral, Daily  ferrous sulfate, 324 mg, Oral, BID With Meals  insulin aspart, 0-14 Units, Subcutaneous, TID AC  insulin detemir, 10 Units, Subcutaneous, Q12H  isosorbide mononitrate, 30 mg, Oral, Daily  ketoconazole, 1 application, Topical, BID  lactobacillus acidophilus, 1 capsule, Oral, BID  levothyroxine, 150 mcg, Oral, Daily  lidocaine, 1 patch, Transdermal, Q24H  metoprolol succinate XL, 125 mg, Oral, Q24H  mupirocin, 1 application, Topical, TID  pantoprazole, 40 mg, Oral, Daily  QUEtiapine, 25 mg, Oral, Nightly  silver sulfadiazine, 1 application, Topical, Q12H  sodium chloride, 10 mL, Intravenous, Q12H  sodium chloride, 10 mL, Intravenous, Q12H  sodium chloride, 10 mL, Intravenous, Q12H  sodium chloride, 3 mL, Intravenous, Q12H  torsemide, 100 mg, Oral, Daily        IV Meds:        Results Reviewed:   I have personally reviewed the results from the time of this admission to 4/10/2022 07:41 EDT     Results from last 7 days   Lab Units 04/10/22  0541  04/07/22  0540 04/06/22  0751 04/05/22  0537 04/04/22  0955   SODIUM mmol/L 140 135* 137   < > 137   POTASSIUM mmol/L 4.3 3.9 3.6   < > 4.1   CHLORIDE mmol/L 103 100 98   < > 95*   CO2 mmol/L 22.4 19.8* 22.4   < > 23.2   BUN mg/dL 45* 45* 64*   < > 97*   CREATININE mg/dL 3.32* 3.02* 3.81*   < > 5.03*   CALCIUM mg/dL 8.8 8.4* 8.2*   < > 7.6*   BILIRUBIN mg/dL  --   --   --   --  0.6   ALK PHOS U/L  --   --   --   --  225*   ALT (SGPT) U/L  --   --   --   --  81*   AST (SGOT) U/L  --   --   --   --  61*   GLUCOSE mg/dL 139* 140* 142*   < > 428*    < > = values in this interval not displayed.       Estimated Creatinine Clearance: 25.3 mL/min (A) (by C-G formula based on SCr of 3.32 mg/dL (H)).    Results from last 7 days   Lab Units 04/10/22  0541 04/06/22  0751 04/05/22  0537   PHOSPHORUS mg/dL 4.3 4.5 5.3*             Results from last 7 days   Lab Units 04/10/22  0541 04/09/22  0541 04/08/22  0530 04/07/22  0540 04/06/22  0751   WBC 10*3/mm3 6.44 6.34 6.05 4.82 4.88   HEMOGLOBIN g/dL 9.9* 9.8* 9.9* 10.4* 9.8*   PLATELETS 10*3/mm3 100* 72* 64* 60* 55*             Brief Urine Lab Results  (Last result in the past 365 days)      Color   Clarity   Blood   Leuk Est   Nitrite   Protein   CREAT   Urine HCG        03/30/22 1705 Yellow   Clear   Trace   Negative   Negative   >=300 mg/dL (3+)                 No results found for: UTPCR    Imaging Results (Last 24 Hours)     ** No results found for the last 24 hours. **              Assessment / Plan     ASSESSMENT:    End-stage renal disease: After denying to have dialysis finally she has agreed, because of multiple issues with the belly peritoneal dialysis catheter was not an option at this point, she ended up with a tunneled dialysis catheter.  We will make arrangements for her outpatient dialysis, once she is better we will have further discussion with the family if they still are interested and wants to do her home peritoneal dialysis.    Anemia: Hemoglobin appears to be  fairly stable, she does have iron deficiency and because of acute infection cannot give any IV iron continue with oral iron and will start BI as an outpatient.    Chronic diastolic congestive heart failure (HCC): We will try to diurese with high-dose diuretics and see if she will respond to it.  Volume status significantly better since on dialysis will take another 2 to 3 L until she gets to euvolemia.    Cor pulmonale, chronic (HCC): Longstanding history of untreated sleep apnea might be contributing.    Hypoglycemia: Oral hypoglycemic agents with prolonged effect from renal failure likely the cause.    Bilateral edema of lower extremity: She does not have much lower extremity edema most of the edema is in the abdominal and chest area appears to be having generalized anasarca.    Essential hypertension: Blood pressure is on the low side we will hold off all blood pressure medications we will go ahead and start her on midodrine to maintain blood pressure while aggressively diuresing her.  I will go ahead and stop the hydralazine today and optimize her volume status and continue with the metoprolol.    Acquired hypothyroidism: Continue home Synthroid dose.    Type 2 diabetes mellitus with nephropathy (HCC): As per hospitalist service.    Cellulitis of both lower extremities: IV antibiotics have been started.    Bradycardia: It has resolved continue to increase beta-blocker for blood pressure control.      PLAN:  Her outpatient schedule is going to.  Tuesday Thursday Saturday, still do not have exact time.  Continue with torsemide 100 mg a day.  Likely will keep the blood pressure more stable as well as continue to improve the volume status as well.  It does appear that Seroquel and Lexapro has helped her.  Once all the arrangements are made for her transportation and outpatient dialysis she will be okay to go home.  It was brought to my attention that she does not actively participate in physical therapy cannot get  out of bed by herself.  She will need Bunny lift and a wheelchair transportation as well as wheelchair to get to and from dialysis.  I will go ahead and plan on doing dialysis on Monday after that she can be set up for Thursday and Saturday schedule after discharge.  Details were discussed with the patient.     Details were also discussed with the hospitalist service as well as nursing staff.  Continue with rest of the current treatment plan, and monitor with surveillance labs.  Further recommendations will depend on clinical course of the patient during the current hospitalization.     Thank you for involving us in the care of Millicent Mckeon.  Please feel free to call with any questions.    Milad Leone MD  04/10/22  07:41 EDT       [facility]  [phone]      Much of this encounter note is an electronic transcription/translation of spoken language to printed text. The electronic translation of spoken language may permit erroneous, or at times, nonsensical words or phrases to be inadvertently transcribed; Although I have reviewed the note for such errors, some may still exist.

## 2022-04-10 NOTE — PLAN OF CARE
Goal Outcome Evaluation:  Plan of Care Reviewed With: patient        Progress: improving  Outcome Evaluation: VSS.  Pt rested comfortably during shift.  No change in pt condition to report.  Will continue to monitor.

## 2022-04-11 LAB
ALBUMIN SERPL-MCNC: 3.5 G/DL (ref 3.5–5.2)
ANION GAP SERPL CALCULATED.3IONS-SCNC: 14.1 MMOL/L (ref 5–15)
BUN SERPL-MCNC: 49 MG/DL (ref 8–23)
BUN/CREAT SERPL: 14.2 (ref 7–25)
CALCIUM SPEC-SCNC: 8.9 MG/DL (ref 8.6–10.5)
CHLORIDE SERPL-SCNC: 102 MMOL/L (ref 98–107)
CO2 SERPL-SCNC: 20.9 MMOL/L (ref 22–29)
CREAT SERPL-MCNC: 3.45 MG/DL (ref 0.57–1)
EGFRCR SERPLBLD CKD-EPI 2021: 14.4 ML/MIN/1.73
GLUCOSE BLDC GLUCOMTR-MCNC: 110 MG/DL (ref 70–130)
GLUCOSE BLDC GLUCOMTR-MCNC: 198 MG/DL (ref 70–130)
GLUCOSE BLDC GLUCOMTR-MCNC: 225 MG/DL (ref 70–130)
GLUCOSE BLDC GLUCOMTR-MCNC: 94 MG/DL (ref 70–130)
GLUCOSE SERPL-MCNC: 114 MG/DL (ref 65–99)
PHOSPHATE SERPL-MCNC: 4.6 MG/DL (ref 2.5–4.5)
POTASSIUM SERPL-SCNC: 4.3 MMOL/L (ref 3.5–5.2)
SODIUM SERPL-SCNC: 137 MMOL/L (ref 136–145)

## 2022-04-11 PROCEDURE — 25010000002 HEPARIN (PORCINE) PER 1000 UNITS: Performed by: INTERNAL MEDICINE

## 2022-04-11 PROCEDURE — 5A1D70Z PERFORMANCE OF URINARY FILTRATION, INTERMITTENT, LESS THAN 6 HOURS PER DAY: ICD-10-PCS | Performed by: INTERNAL MEDICINE

## 2022-04-11 PROCEDURE — 97110 THERAPEUTIC EXERCISES: CPT

## 2022-04-11 PROCEDURE — 99232 SBSQ HOSP IP/OBS MODERATE 35: CPT | Performed by: FAMILY MEDICINE

## 2022-04-11 PROCEDURE — 97530 THERAPEUTIC ACTIVITIES: CPT

## 2022-04-11 PROCEDURE — 63710000001 INSULIN DETEMIR PER 5 UNITS: Performed by: FAMILY MEDICINE

## 2022-04-11 PROCEDURE — 82962 GLUCOSE BLOOD TEST: CPT

## 2022-04-11 PROCEDURE — 97116 GAIT TRAINING THERAPY: CPT

## 2022-04-11 PROCEDURE — 80069 RENAL FUNCTION PANEL: CPT | Performed by: INTERNAL MEDICINE

## 2022-04-11 RX ORDER — ALBUMIN (HUMAN) 12.5 G/50ML
12.5 SOLUTION INTRAVENOUS AS NEEDED
Status: ACTIVE | OUTPATIENT
Start: 2022-04-11 | End: 2022-04-12

## 2022-04-11 RX ORDER — HEPARIN SODIUM 1000 [USP'U]/ML
1000 INJECTION, SOLUTION INTRAVENOUS; SUBCUTANEOUS AS NEEDED
Status: DISCONTINUED | OUTPATIENT
Start: 2022-04-11 | End: 2022-04-14 | Stop reason: HOSPADM

## 2022-04-11 RX ADMIN — FERROUS SULFATE TAB EC 324 MG (65 MG FE EQUIVALENT) 324 MG: 324 (65 FE) TABLET DELAYED RESPONSE at 17:51

## 2022-04-11 RX ADMIN — QUETIAPINE FUMARATE 25 MG: 25 TABLET ORAL at 21:35

## 2022-04-11 RX ADMIN — APIXABAN 2.5 MG: 2.5 TABLET, FILM COATED ORAL at 21:35

## 2022-04-11 RX ADMIN — SILVER SULFADIAZINE 1 APPLICATION: 10 CREAM TOPICAL at 18:58

## 2022-04-11 RX ADMIN — DOCUSATE SODIUM 100 MG: 100 CAPSULE, LIQUID FILLED ORAL at 21:35

## 2022-04-11 RX ADMIN — Medication 10 ML: at 21:36

## 2022-04-11 RX ADMIN — LEVOTHYROXINE SODIUM 150 MCG: 150 TABLET ORAL at 13:47

## 2022-04-11 RX ADMIN — HEPARIN SODIUM 1000 UNITS: 1000 INJECTION INTRAVENOUS; SUBCUTANEOUS at 12:40

## 2022-04-11 RX ADMIN — ASPIRIN 81 MG: 81 TABLET, CHEWABLE ORAL at 13:47

## 2022-04-11 RX ADMIN — PANTOPRAZOLE SODIUM 40 MG: 40 TABLET, DELAYED RELEASE ORAL at 13:50

## 2022-04-11 RX ADMIN — Medication 3 ML: at 21:35

## 2022-04-11 RX ADMIN — Medication 1 CAPSULE: at 21:35

## 2022-04-11 RX ADMIN — TORSEMIDE 100 MG: 100 TABLET ORAL at 13:46

## 2022-04-11 RX ADMIN — Medication 1 TABLET: at 13:52

## 2022-04-11 RX ADMIN — INSULIN DETEMIR 10 UNITS: 100 INJECTION, SOLUTION SUBCUTANEOUS at 21:38

## 2022-04-11 RX ADMIN — KETOCONAZOLE 1 APPLICATION: 20 CREAM TOPICAL at 13:49

## 2022-04-11 RX ADMIN — Medication 10 ML: at 13:49

## 2022-04-11 RX ADMIN — METOPROLOL SUCCINATE 125 MG: 25 TABLET, EXTENDED RELEASE ORAL at 13:46

## 2022-04-11 RX ADMIN — ISOSORBIDE MONONITRATE 30 MG: 30 TABLET, EXTENDED RELEASE ORAL at 13:46

## 2022-04-11 RX ADMIN — SILVER SULFADIAZINE 1 APPLICATION: 10 CREAM TOPICAL at 13:53

## 2022-04-11 RX ADMIN — ESCITALOPRAM OXALATE 10 MG: 10 TABLET ORAL at 13:50

## 2022-04-11 NOTE — CASE MANAGEMENT/SOCIAL WORK
Continued Stay Note  [facility]     Patient Name: Millicent Mckeon  MRN: [id number]  Today's Date: 4/11/2022    Admit Date: 3/30/2022     Discharge Plan     Row Name 04/11/22 1009       Plan    Plan Pt is in dialysis unable to speak to her Called pt niece  She reports that the pt brother will be staying with her but she will have to be able to walk  Brother is unable to use beatrice lift .Sent referrals to see what facilities have  Openings    Spoke to pt she is agreeing to work with therapy Explained that she will need to be to walk to the bathroom or go to rehab for a short time before she can go home with her family assistance .  Tae Zhang is looking at the referral packet                Discharge Codes    No documentation.               Expected Discharge Date and Time     Expected Discharge Date Expected Discharge Time    Apr 11, 2022             Azalia Mcfadden RN

## 2022-04-11 NOTE — PROGRESS NOTES
[facility]    Name:  Millicent Mckeon   Age:  61 y.o.  Sex:  female  :  1960  MRN:  [id number]   Visit Number:  [id number]  Admission Date:  3/30/2022  Date Of Service:  22  Primary Care Physician:  Marianela Albright APRN     LOS: 10 days :    Chief Complaint:      Follow-up weakness, renal failure    Subjective:    Patient seen and evaluated at time of dialysis today.  She was actually more awake, alert, and more pleasant than prior visits.  She did note that she will consider doing dialysis when she goes home, maybe twice a week she tells me.  I discussed we are still trying to come up with a safe discharge plan as has been difficult to ensure family will be able to help her.  She had no specific complaints today.    Hospital Course:    The patient is a 61-year-old chronically ill-appearing female with history of atrial fibrillation, chronic systolic congestive heart failure, CKD stage IV, chronic cor pulmonale, morbid obesity, functional quadriplegia who had presented to the emergency room with generalized weakness.  She had apparently been found at home disheveled and multiple skin sores and wounds.  She had had multiple treatments lately for cellulitis of the lower extremity and hypoglycemia.  Apparently lives at home per family report.    Initial work-up was demonstrating bradycardia, hypoglycemia, with creatinine 3.74 and a BUN of 75.  Hemoglobin 10.8.  Chest x-ray show cardiomegaly and bilateral opacity in the lower zones.  Concern for pneumonia and she was given azithromycin and Rocephin.  She did develop some low blood pressures and became hypothermic upon admission.  She was started on midodrine and IV Lasix and had Wilkinson catheter placed.  There was concern for sepsis due to her recent hospitalization she was started on Zosyn and vancomycin.  She had a left internal jugular central venous line placed on 3/31/2022.  She subsequent had improvement in  her blood pressures.  Her MRSA swab was negative, vancomycin was discontinued.  She also had some improvement in her urine output.  She was noted to have overall worsening kidney function, discussions had about dialysis.  Patient was seen by palliative care services.  She did elect to be a DNR/DNI.  After discussion with patient's niece, patient is now in agreement with trial of dialysis and will plan on having a tunneled dialysis catheter placed.  This was placed on 4/5/2022 and she started hemodialysis.  Case management consulted to work on outpatient dialysis set up as well as safe discharge plan with family.    Review of Systems:     All systems were reviewed and negative except as mentioned in subjective, assessment and plan.    Vital Signs:    Temp:  [97.4 °F (36.3 °C)-97.7 °F (36.5 °C)] 97.7 °F (36.5 °C)  Heart Rate:  [74-92] 88  Resp:  [16-19] 19  BP: (134-146)/(66-94) 141/94    Intake and output:    I/O last 3 completed shifts:  In: 240 [P.O.:240]  Out: -   No intake/output data recorded.    Physical Examination:    General Appearance:  Alert and cooperative.  No acute distress.  Chronically ill-appearing   Head:  Atraumatic and normocephalic.   Eyes: Conjunctivae and sclerae normal, no icterus. No pallor.   Throat: No oral lesions, no thrush, oral mucosa moist.   Neck: Supple, trachea midline, no thyromegaly.   Lungs:    Breath sounds diminished.   Heart:  Normal S1 and S2, no murmur, no gallop, no rub. No JVD.   Abdomen:   Normal bowel sounds, no masses, no organomegaly. Soft, nontender, nondistended, no rebound tenderness.  Obese, Wilkinson catheter noted   Extremities: Supple, 2+ lower edema unchanged, no cyanosis, no clubbing.  Venous insufficiency noted.   Skin:  Patient has multiple areas of erythema of the lower extremities, scabs, ulcerations of the skin.  No significant changes noted   Neurologic: Alert and oriented x 3. No facial asymmetry. Moves all four limbs. No tremors.       Laboratory  results:    Results from last 7 days   Lab Units 04/11/22  0626 04/10/22  0541 04/07/22  0540   SODIUM mmol/L 137 140 135*   POTASSIUM mmol/L 4.3 4.3 3.9   CHLORIDE mmol/L 102 103 100   CO2 mmol/L 20.9* 22.4 19.8*   BUN mg/dL 49* 45* 45*   CREATININE mg/dL 3.45* 3.32* 3.02*   CALCIUM mg/dL 8.9 8.8 8.4*   GLUCOSE mg/dL 114* 139* 140*     Results from last 7 days   Lab Units 04/10/22  0541 04/09/22  0541 04/08/22  0530   WBC 10*3/mm3 6.44 6.34 6.05   HEMOGLOBIN g/dL 9.9* 9.8* 9.9*   HEMATOCRIT % 33.2* 32.4* 32.7*   PLATELETS 10*3/mm3 100* 72* 64*                     I have reviewed the patient's laboratory results.    Radiology results:    No radiology results from the last 24 hrs  I have reviewed the patient's radiology reports.    Medication Review:     I have reviewed the patient's active and prn medications.     Problem List:      Hypoglycemia    Bilateral edema of lower extremity    Essential hypertension    Acquired hypothyroidism    Type 2 diabetes mellitus with nephropathy (HCC)    Cellulitis of both lower extremities    Anemia due to stage 4 chronic kidney disease (HCC)    Chronic diastolic congestive heart failure (HCC)    Cor pulmonale, chronic (HCC)    Chronic kidney disease, stage IV (severe) (HCC)    Bradycardia      Assessment:    1. Sepsis with generalized weakness, hypothermia and hypotension secondary to #2,POA, resolved.  2. Bilateral lower extremity cellulitis, POA, improving.  3. Acute hypoglycemia, POA, resolved.  4. Severe bradycardia possibly related to medications in the setting of worsening renal failure, resolved.  5. Progressive worsening of chronic kidney disease stage IV with volume overload, POA, improving.  6. Chronic venous stasis of the lower extremities with multiple skin ulcers, POA..  7. Chronic systolic heart failure with ejection fraction of 35%.  8. Paroxysmal atrial fibrillation on apixaban.  9. Chronic cor pulmonale.  10. Chronic hypoxic respiratory failure on home  oxygen.  11. Diabetes mellitus type 2 with nephropathy.  12. Morbid obesity with a BMI of 57.  13. Anemia of chronic kidney disease.  14. Functional quadriplegia.  15. Acquired hypothyroidism.  16. Sacral deep tissue injury present on admission.      Plan:    Sepsis/hypothermia/hypotension.  Initially on empiric antibiotics with Zosyn, switched to Augmentin and completed treatment for cellulitis.     Generalized weakness/hypoglycemia/bradycardia.    Likely multifactorial in setting of severe multiple comorbidities, functional quadriplegia, worsening renal and congestive heart failure noted.  Her Toprol-XL was restarted.       Progressive worsening of chronic kidney disease stage IV.  Patient has been receiving hemodialysis after having a tunneled dialysis catheter placed.  Waiting to hear back for outpatient dialysis set up.  Family dynamics has also been an issue.     Diabetes mellitus type 2.  A1c of 8.5.  She continues on Levemir 20 units twice a day with aggressive SSI protocol.    Patient remains on hemodialysis and thus far is tolerating well.  Case management is following, we are attempting to get appropriate discharge plan arranged, has been difficult with family issues.    DVT Prophylaxis: Eliquis   Code Status: DNR/DNI  Diet: Renal  Discharge Plan: ADI Valencia DO  04/11/22  11:55 EDT    Dictated utilizing Dragon dictation.

## 2022-04-11 NOTE — PROGRESS NOTES
[facility] Progress Note  [facility]. [place]        Patient Name: Millicent Mckeon  : 1960  MRN: [id number]   LOS: 10 days    Patient Care Team:  Marianela Albright APRN as PCP - General (Family Medicine)  Geremias Shepherd MD as Consulting Physician (General Surgery)  Lisa Cardoso MD as Surgeon (General Surgery)    Chief Complaint:    Chief Complaint   Patient presents with   • Weakness - Generalized     Unable to stand     Primary Care Physician:  Marianela Albright APRN  Date of admission: 3/30/2022    Subjective     Interval History:   Events noted from last 24 hours.  She drowsy and sleepy this morning.  Easily arousable she denies having any chest pain or shortness of breath.  Denies any fever or chills.  She does feel that things are better still not actively participating in physical therapy.  Outpatient dialysis schedule is available.  Clinically appears to be improving.  Patient seen during dialysis clinically stable.  Review of Systems:   As noted above    Objective     Vitals:   Temp:  [97.4 °F (36.3 °C)-97.9 °F (36.6 °C)] 97.7 °F (36.5 °C)  Heart Rate:  [74-92] 88  Resp:  [16-19] 19  BP: (133-146)/(66-94) 141/94    Intake/Output Summary (Last 24 hours) at 2022 0725  Last data filed at 4/10/2022 1200  Gross per 24 hour   Intake 240 ml   Output --   Net 240 ml       Physical Exam:    General Appearance: alert,  no acute distress   Skin: warm and dry  HEENT: oral mucosa normal, nonicteric sclera  Neck: supple, no JVD  Lungs: Still have decreased breath sounds at both bases.  Heart: RRR, normal S1 and S2  Abdomen: Obese, soft to firm, nontender, nondistended, she does have dependent edema mostly on the back of her body including sacral area.  : no palpable bladder  Extremities: 1 + edema, mostly it is dependent edema, no cyanosis or clubbing  Neuro: Awake and interactive, randomly move extremities.    Scheduled Meds:     Current Facility-Administered  Medications   Medication Dose Route Frequency Provider Last Rate Last Admin   • acetaminophen (TYLENOL) tablet 650 mg  650 mg Oral Q4H PRN Cheyanne Valencia DO   650 mg at 03/31/22 0629    Or   • acetaminophen (TYLENOL) 160 MG/5ML solution 650 mg  650 mg Oral Q4H PRN Cheyanne Valencia DO        Or   • acetaminophen (TYLENOL) suppository 650 mg  650 mg Rectal Q4H PRN Cheyanne Valencia DO       • apixaban (ELIQUIS) tablet 2.5 mg  2.5 mg Oral Q12H Doc Keith MD   2.5 mg at 04/10/22 2053   • aspirin chewable tablet 81 mg  81 mg Oral Daily Cheyanne Valencia DO   81 mg at 04/10/22 0819   • b complex-vitamin c-folic acid (NEPHRO-LOIS) tablet 1 tablet  1 tablet Oral Daily Cheyanne Valencia DO   1 tablet at 04/10/22 0821   • dextrose (D50W) (25 g/50 mL) IV injection 25 g  25 g Intravenous Q15 Min PRN Cheyanne Valencia DO       • dextrose (D50W) (25 g/50 mL) IV injection 50 mL  50 mL Intravenous Q1H PRN Cheyanne Valencia DO   50 mL at 03/31/22 0630   • dextrose (GLUTOSE) oral gel 1 tube  1 tube Oral Q15 Min PRN Cheyanne Valencia DO       • docusate sodium (COLACE) capsule 100 mg  100 mg Oral BID Cheyanne Valencia DO   100 mg at 04/10/22 2053   • escitalopram (LEXAPRO) tablet 10 mg  10 mg Oral Daily Milad Leone MD   10 mg at 04/10/22 0821   • ferrous sulfate EC tablet 324 mg  324 mg Oral BID With Meals Cheyanne Valencia DO   324 mg at 04/10/22 1735   • glucagon (human recombinant) (GLUCAGEN DIAGNOSTIC) injection 1 mg  1 mg Subcutaneous PRN Cheyanne Valencia DO       • heparin (porcine) injection 1,000 Units  1,000 Units Intracatheter PRN Cheyanne Valencia DO       • heparin (porcine) injection 1,000 Units  1,000 Units Intracatheter PRN Milad Leone MD       • heparin (porcine) injection 1,000 Units  1,000 Units Intracatheter PRN Milad Leone MD       • insulin aspart (novoLOG) injection 0-14 Units  0-14 Units Subcutaneous TID AC Cheyanne Valencia  DO Syed   3 Units at 04/09/22 1153   • insulin detemir (LEVEMIR) injection 10 Units  10 Units Subcutaneous Q12H Cheyanne Valencia DO   10 Units at 04/10/22 2102   • ipratropium-albuterol (DUO-NEB) nebulizer solution 3 mL  3 mL Nebulization Q4H PRN Cheyanne Valencia DO       • isosorbide mononitrate (IMDUR) 24 hr tablet 30 mg  30 mg Oral Daily Cheyanne Valencia DO   30 mg at 04/10/22 0821   • ketoconazole (NIZORAL) 2 % cream 1 application  1 application Topical BID Cheyanne Valencia DO   1 application at 04/10/22 0819   • lactobacillus acidophilus (RISAQUAD) capsule 1 capsule  1 capsule Oral BID Cheyanne Valencia DO   1 capsule at 04/10/22 2053   • levothyroxine (SYNTHROID, LEVOTHROID) tablet 150 mcg  150 mcg Oral Daily Cheyanne Valencia DO   150 mcg at 04/10/22 0820   • lidocaine (LIDODERM) 5 % 1 patch  1 patch Transdermal Q24H Cheyanne Valencia DO   1 patch at 04/10/22 2054   • metoprolol succinate XL (TOPROL-XL) 24 hr tablet 125 mg  125 mg Oral Q24H Milad Leone MD   125 mg at 04/10/22 0820   • mupirocin (BACTROBAN) 2 % cream 1 application  1 application Topical TID Cheyanne Valencia DO   1 application at 04/10/22 1636   • ondansetron (ZOFRAN) injection 4 mg  4 mg Intravenous Q6H PRN Cheyanne Valencia DO       • pantoprazole (PROTONIX) EC tablet 40 mg  40 mg Oral Daily Cheyanne Valencia DO   40 mg at 04/10/22 0820   • QUEtiapine (SEROquel) tablet 25 mg  25 mg Oral Nightly Milad Leone MD   25 mg at 04/10/22 2053   • silver sulfadiazine (SILVADENE, SSD) 1 % cream 1 application  1 application Topical Q12H Cheyanne Valencia DO   1 application at 04/10/22 0819   • sodium chloride 0.9 % flush 10 mL  10 mL Intravenous PRN Cheyanne Valencia,        • sodium chloride 0.9 % flush 10 mL  10 mL Intravenous Q12H Cheyanne Valencia DO   10 mL at 04/09/22 2120   • sodium chloride 0.9 % flush 10 mL  10 mL Intravenous Q12H Cheyanne Valencia  Syed, DO   10 mL at 04/10/22 0824   • sodium chloride 0.9 % flush 10 mL  10 mL Intravenous Q12H Karrick, Cheyanne Syed, DO   10 mL at 04/10/22 2053   • sodium chloride 0.9 % flush 10 mL  10 mL Intravenous PRN Karrick, Cheyanne Syed, DO       • sodium chloride 0.9 % flush 20 mL  20 mL Intravenous PRN Karrick, Cheyanne Syed, DO       • sodium chloride 0.9 % flush 3 mL  3 mL Intravenous Q12H Karrick, Cheyanne Syed, DO   3 mL at 04/09/22 2121   • sodium chloride 0.9 % flush 3-10 mL  3-10 mL Intravenous PRN Karrick, Cheyanne Syed, DO       • torsemide (DEMADEX) tablet 100 mg  100 mg Oral Daily Milad Leone MD   100 mg at 04/10/22 0819       apixaban, 2.5 mg, Oral, Q12H  aspirin, 81 mg, Oral, Daily  b complex-vitamin c-folic acid, 1 tablet, Oral, Daily  docusate sodium, 100 mg, Oral, BID  escitalopram, 10 mg, Oral, Daily  ferrous sulfate, 324 mg, Oral, BID With Meals  insulin aspart, 0-14 Units, Subcutaneous, TID AC  insulin detemir, 10 Units, Subcutaneous, Q12H  isosorbide mononitrate, 30 mg, Oral, Daily  ketoconazole, 1 application, Topical, BID  lactobacillus acidophilus, 1 capsule, Oral, BID  levothyroxine, 150 mcg, Oral, Daily  lidocaine, 1 patch, Transdermal, Q24H  metoprolol succinate XL, 125 mg, Oral, Q24H  mupirocin, 1 application, Topical, TID  pantoprazole, 40 mg, Oral, Daily  QUEtiapine, 25 mg, Oral, Nightly  silver sulfadiazine, 1 application, Topical, Q12H  sodium chloride, 10 mL, Intravenous, Q12H  sodium chloride, 10 mL, Intravenous, Q12H  sodium chloride, 10 mL, Intravenous, Q12H  sodium chloride, 3 mL, Intravenous, Q12H  torsemide, 100 mg, Oral, Daily        IV Meds:        Results Reviewed:   I have personally reviewed the results from the time of this admission to 4/11/2022 07:25 EDT     Results from last 7 days   Lab Units 04/11/22  0626 04/10/22  0541 04/07/22  0540 04/05/22  0537 04/04/22  0955   SODIUM mmol/L 137 140 135*   < > 137   POTASSIUM mmol/L 4.3 4.3 3.9   < > 4.1   CHLORIDE mmol/L 102  103 100   < > 95*   CO2 mmol/L 20.9* 22.4 19.8*   < > 23.2   BUN mg/dL 49* 45* 45*   < > 97*   CREATININE mg/dL 3.45* 3.32* 3.02*   < > 5.03*   CALCIUM mg/dL 8.9 8.8 8.4*   < > 7.6*   BILIRUBIN mg/dL  --   --   --   --  0.6   ALK PHOS U/L  --   --   --   --  225*   ALT (SGPT) U/L  --   --   --   --  81*   AST (SGOT) U/L  --   --   --   --  61*   GLUCOSE mg/dL 114* 139* 140*   < > 428*    < > = values in this interval not displayed.       Estimated Creatinine Clearance: 24.3 mL/min (A) (by C-G formula based on SCr of 3.45 mg/dL (H)).    Results from last 7 days   Lab Units 04/11/22  0626 04/10/22  0541 04/06/22  0751   PHOSPHORUS mg/dL 4.6* 4.3 4.5             Results from last 7 days   Lab Units 04/10/22  0541 04/09/22  0541 04/08/22  0530 04/07/22  0540 04/06/22  0751   WBC 10*3/mm3 6.44 6.34 6.05 4.82 4.88   HEMOGLOBIN g/dL 9.9* 9.8* 9.9* 10.4* 9.8*   PLATELETS 10*3/mm3 100* 72* 64* 60* 55*             Brief Urine Lab Results  (Last result in the past 365 days)      Color   Clarity   Blood   Leuk Est   Nitrite   Protein   CREAT   Urine HCG        03/30/22 1705 Yellow   Clear   Trace   Negative   Negative   >=300 mg/dL (3+)                 No results found for: UTPCR    Imaging Results (Last 24 Hours)     ** No results found for the last 24 hours. **              Assessment / Plan     ASSESSMENT:    End-stage renal disease: After denying to have dialysis finally she has agreed, because of multiple issues with the belly peritoneal dialysis catheter was not an option at this point, she ended up with a tunneled dialysis catheter.  We will make arrangements for her outpatient dialysis, once she is better we will have further discussion with the family if they still are interested and wants to do her home peritoneal dialysis.    Anemia: Hemoglobin appears to be fairly stable, she does have iron deficiency and because of acute infection cannot give any IV iron continue with oral iron and will start BI as an  outpatient.    Chronic diastolic congestive heart failure (HCC): We will try to diurese with high-dose diuretics and see if she will respond to it.  Volume status significantly better since on dialysis will take another 2 to 3 L until she gets to euvolemia.    Cor pulmonale, chronic (HCC): Longstanding history of untreated sleep apnea might be contributing.    Hypoglycemia: Oral hypoglycemic agents with prolonged effect from renal failure likely the cause.    Bilateral edema of lower extremity: She does not have much lower extremity edema most of the edema is in the abdominal and chest area appears to be having generalized anasarca.    Essential hypertension: Blood pressure is on the low side we will hold off all blood pressure medications we will go ahead and start her on midodrine to maintain blood pressure while aggressively diuresing her.  I will go ahead and stop the hydralazine today and optimize her volume status and continue with the metoprolol.    Acquired hypothyroidism: Continue home Synthroid dose.    Type 2 diabetes mellitus with nephropathy (HCC): As per hospitalist service.    Cellulitis of both lower extremities: IV antibiotics have been started.    Bradycardia: It has resolved continue to increase beta-blocker for blood pressure control.      PLAN:  I called the dialysis clinic and gave her orders for her outpatient dialysis.  Her days been switched to Monday Wednesday Friday at 7 AM.  She will need Bunny lift and a wheelchair transportation as well as wheelchair to get to and from dialysis.  It is still not fairly clear who is going to help her at home.  If clinically stable post dialysis she will be able to go today.  Details were discussed with the patient.     Details were also discussed with the hospitalist service as well as nursing staff.  She needs to go home with the current medications.  Continue with rest of the current treatment plan, and monitor with surveillance labs.  Further  recommendations will depend on clinical course of the patient during the current hospitalization.     Thank you for involving us in the care of Millicent Mckeon.  Please feel free to call with any questions.    Milad Leone MD  04/11/22  07:25 EDT       [facility]  [phone]      Much of this encounter note is an electronic transcription/translation of spoken language to printed text. The electronic translation of spoken language may permit erroneous, or at times, nonsensical words or phrases to be inadvertently transcribed; Although I have reviewed the note for such errors, some may still exist.

## 2022-04-11 NOTE — DISCHARGE PLACEMENT REQUEST
"  Azalia Mcfadden Rn  [phone] fax [phone]  Tatiana Mckeon (61 y.o. Female)             Date of Birth   1960    Social Security Number       Address   [address]  [facility] [address]    Home Phone   [phone]    MRN   [id number]       Confucianism   Unknown    Marital Status   Single                            Admission Date   3/30/22    Admission Type   Emergency    Admitting Provider   Samson Reyes MD    Attending Provider   Cheyanne Valencia DO    Department, Room/Bed   [facility] MED SURG  4, 429/1       Discharge Date       Discharge Disposition       Discharge Destination                               Attending Provider: Cheyanne Valencia DO    Allergies: Metformin And Related    Isolation: Contact   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), 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), ESBL (04/05/22)   Code Status: No CPR   Advance Care Planning Activity    Ht: 167.4 cm (65.91\")   Wt: 142 kg (313 lb 0.9 oz)    Admission Cmt: None   Principal Problem: Hypoglycemia [E16.2]                 Active Insurance as of 3/30/2022     Primary Coverage     Payor Plan Insurance Group Employer/Plan Group    MEDICARE MEDICARE A & B      Payor Plan Address Payor Plan Phone Number Payor Plan Fax Number Effective Dates    [address] [phone]  12/1/1998 - None Entered    [facility] [id number]       Subscriber Name Subscriber Birth Date Member ID       TATIANA MCKEON 1960 [id number]           Secondary Coverage     Payor Plan Insurance Group Employer/Plan Group    [facility] COMMUNITY PLAN [facility] COMMUNITY PLAN Levine, Susan. \Hospital Has a New Name and Outlook.\""     Payor Plan Address Payor Plan Phone Number Payor Plan Fax Number Effective Dates    [address]   1/1/2022 - None Entered    [facility] [phone]       Subscriber Name Subscriber Birth Date Member ID       TATIANA MCKEON 1960 [id number]                 Emergency Contacts      (Rel.) Home Phone Work Phone Mobile Phone    SUNDAY CHACON (Relative) [phone] -- " [phone]    Adolfo Mckeon (Brother) [phone] -- --               History & Physical      Samson Reyes MD at 22 1706            [facility]   HISTORY AND PHYSICAL      Name:  Millicent Mckeon   Age:  61 y.o.  Sex:  female  :  1960  MRN:  [id number]   Visit Number:  [id number]  Admission Date:  3/30/2022  Date Of Service:  22  Primary Care Physician:  Marianela Albright APRN    Chief Complaint:     Generalized weakness.    History Of Presenting Illness:      Ms. Mckeon is a 61-year-old chronically ill lady with history of atrial fibrillation, chronic systolic heart failure, chronic kidney disease stage IV, chronic cor pulmonale, morbid obesity, functional quadriplegia who is wheelchair-bound was brought to the emergency room by EMS with symptoms of generalized weakness.  Apparently she was found at home disheveled, covered in various stages of skin sores and wounds.  She was discharged by me from this facility on 3/7/2022 October being treated for severe hypothermia likely environmental associated with hypoglycemia and bilateral lower extremity cellulitis.  At that time her Bumex dose was increased and she was sent home on antibiotics therapy.  Patient apparently lives alone after her sister passed away in 2021.  She states that she basically scoots from the bed to the potty chair but otherwise she is unable to walk.  She states that her brother comes every day and brings her food.  She stated that she asked her niece to call the ambulance because she was so weak that she could not stand up today.    In the emergency room, she was afebrile with a pulse of 47 and blood pressure of 121/82 and saturating at 94% on room air.  Blood work done in the emergency room revealed a glucose of 40 with a creatinine of 3.74 (apparently about baseline), BUN of 75, alkaline phosphate of 261 and a hemoglobin of 10.8.  Her proBNP level was 17,302 but troponin was negative.   Chest x-ray showed cardiomegaly with bilateral homogeneous opacity in the lower zones.  Patient was given IV dextrose 1 ampoule in the emergency room.  She is currently being admitted to the medical floor with telemetry for further evaluation and management.    Review Of Systems:    All systems were reviewed and negative except as mentioned in history of presenting illness, assessment and plan.    Past Medical History: Patient  has a past medical history of A-fib (HCC), Anemia (10/2/2018), Diabetes mellitus (HCC), Disease of thyroid gland, GERD (gastroesophageal reflux disease), Gout, History of transfusion, and Hypertension.    Past Surgical History: Patient  has a past surgical history that includes Eye surgery; Leg Surgery; and incision and drainage leg (Right, 1/14/2019).    Social History: Patient  reports that she has never smoked. She has never used smokeless tobacco. She reports that she does not drink alcohol and does not use drugs.    Family History: Patient's family history includes Asthma in her mother; Hypertension in her father; Stroke in her father.    Allergies:      Metformin and related    Home Medications:    Prior to Admission Medications     Prescriptions Last Dose Informant Patient Reported? Taking?    acetaminophen (TYLENOL) 325 MG tablet   Yes No    Take 650 mg by mouth Every 4 (Four) Hours As Needed for Mild Pain .    amiodarone (PACERONE) 100 MG tablet   No No    Take 1 tablet by mouth Every Other Day.    ammonium lactate (AMLACTIN) 12 % cream   Yes No    Apply 1 application topically to the appropriate area as directed 2 (Two) Times a Day.    apixaban (ELIQUIS) 5 MG tablet tablet   No No    Take 1 tablet by mouth Every 12 (Twelve) Hours.    arginine 500 MG tablet   Yes No    Take 500 mg by mouth Daily.    aspirin 81 MG chewable tablet   Yes No    Chew 81 mg Daily.    bumetanide (Bumex) 1 MG tablet   No No    Take 1 tablet by mouth 2 (Two) Times a Day.    docusate sodium (COLACE) 100 MG  capsule   Yes No    Take 100 mg by mouth 2 (Two) Times a Day.    Elastic Bandages & Supports (JOBST OPAQUE KNEE 20-30MMHG XL) misc   No No    1 application Daily.    ferrous sulfate 324 (65 Fe) MG tablet delayed-release EC tablet   Yes No    Take 324 mg by mouth 2 (Two) Times a Day With Meals.    insulin aspart (novoLOG) 100 UNIT/ML injection   Yes No    Inject  under the skin into the appropriate area as directed 2 (Two) Times a Day. Sliding scale, low dose     isosorbide mononitrate (ISMO,MONOKET) 10 MG tablet   Yes No    Take 15 mg by mouth Daily.    levothyroxine (SYNTHROID, LEVOTHROID) 150 MCG tablet   Yes No    Take 150 mcg by mouth Daily.    lidocaine (LIDODERM) 5 %   Yes No    Place 1 patch on the skin as directed by provider Daily. Apply patch to left knee daily, on at 0700 am off at 2000    metoprolol tartrate (LOPRESSOR) 100 MG tablet   No No    Take 1 tablet by mouth 2 (Two) Times a Day.    multivitamin (Thera) tablet tablet   Yes No    TAKE 1 TABLET BY MOUTH EVERY DAY    nitroglycerin (NITROSTAT) 0.4 MG SL tablet   Yes No    Place 0.4 mg under the tongue Every 5 (Five) Minutes As Needed for Chest Pain. Take no more than 3 doses in 15 minutes.    Nutritional Supplements (PROTEIN SUPPLEMENT 80% PO)   Yes No    Take 30 mL by mouth 2 (Two) Times a Day.    ondansetron ODT (ZOFRAN-ODT) 4 MG disintegrating tablet   Yes No    DISSOLVE 1 TABLET ON THE TONGUE EVERY 8 HOURS AS NEEDED FOR NAUSEA OR VOMITING    pantoprazole (PROTONIX) 40 MG EC tablet   Yes No    Take 40 mg by mouth Daily.    polyethylene glycol (MIRALAX) packet   Yes No    Take 17 g by mouth 2 (Two) Times a Day.    RA VITAMIN C 500 MG tablet   Yes No    Take 500 mg by mouth Daily.    saccharomyces boulardii (FLORASTOR) 250 MG capsule   Yes No    Take 250 mg by mouth 2 (Two) Times a Day.    simvastatin (ZOCOR) 20 MG tablet   Yes No    Take 20 mg by mouth Every Night.    spironolactone (ALDACTONE) 25 MG tablet   No No    Take 1 tablet by mouth 3  "(Three) Times a Week. Monday, Wednesday and Friday.    Zinc Sulfate 220 (50 Zn) MG tablet   Yes No    Take 1 tablet by mouth 2 (Two) Times a Day.        ED Medications:    Medications   sodium chloride 0.9 % flush 10 mL (has no administration in time range)   dextrose (D50W) (25 g/50 mL) IV injection 50 mL (50 mL Intravenous Given 3/30/22 1626)     Vital Signs:  Temp:  [98.7 °F (37.1 °C)] 98.7 °F (37.1 °C)  Heart Rate:  [47] 47  Resp:  [16] 16  BP: (121)/(82) 121/82        03/30/22  1521   Weight: (!) 160 kg (353 lb)     Body mass index is 57.14 kg/m².    Physical Exam:     Most recent vital Signs: /82 (BP Location: Left arm, Patient Position: Lying)   Pulse (!) 47   Temp 98.7 °F (37.1 °C) (Oral)   Resp 16   Ht 167.4 cm (65.91\")   Wt (!) 160 kg (353 lb)   SpO2 94%   BMI 57.14 kg/m²     Physical Exam  Constitutional:       General: She is not in acute distress.     Appearance: She is obese. She is ill-appearing.   HENT:      Head: Normocephalic and atraumatic.      Right Ear: External ear normal.      Left Ear: External ear normal.      Nose: Nose normal.      Mouth/Throat:      Mouth: Mucous membranes are moist.   Eyes:      Extraocular Movements: Extraocular movements intact.      Conjunctiva/sclera: Conjunctivae normal.   Cardiovascular:      Rate and Rhythm: Bradycardia present.      Heart sounds: Normal heart sounds. No murmur heard.    No gallop.   Pulmonary:      Effort: Pulmonary effort is normal.      Breath sounds: Rales present. No wheezing.   Abdominal:      General: Bowel sounds are normal. There is distension.      Palpations: Abdomen is soft.      Tenderness: There is no abdominal tenderness. There is no guarding.      Comments: Abdomen is obese with a large pannus   Musculoskeletal:      Cervical back: Neck supple. No rigidity.      Comments: 3+ pitting edema noted in bilateral lower extremities up to the mid thighs associated with erythema up to the knees with chronic bilateral " peripheral cyanosis.  Multiple skin wounds with various stages of healing and some nonhealing ulcers noted in the lower extremities.   Skin:     General: Skin is dry.      Coloration: Skin is not jaundiced.      Findings: Erythema and rash present.   Neurological:      Mental Status: She is alert and oriented to person, place, and time. Mental status is at baseline.   Psychiatric:         Mood and Affect: Mood normal.         Behavior: Behavior normal.       Laboratory data:    I have reviewed the labs done in the emergency room.    Results from last 7 days   Lab Units 03/30/22  1534   SODIUM mmol/L 142   POTASSIUM mmol/L 4.5   CHLORIDE mmol/L 100   CO2 mmol/L 24.2   BUN mg/dL 75*   CREATININE mg/dL 3.74*   CALCIUM mg/dL 8.0*   BILIRUBIN mg/dL 0.7   ALK PHOS U/L 261*   ALT (SGPT) U/L 17   AST (SGOT) U/L 22   GLUCOSE mg/dL 40*     Results from last 7 days   Lab Units 03/30/22  1534   WBC 10*3/mm3 4.54   HEMOGLOBIN g/dL 10.8*   HEMATOCRIT % 35.1   PLATELETS 10*3/mm3 106*         Results from last 7 days   Lab Units 03/30/22  1534   TROPONIN T ng/mL 0.025     Results from last 7 days   Lab Units 03/30/22  1534   PROBNP pg/mL 17,302.0*     EKG:      EKG done in the emergency room was reviewed by me.  It shows atrial fibrillation with slow ventricular rate of 50 bpm.  Normal axis.  Incomplete left bundle branch block noted.    Radiology:    No radiology results for the last 3 days    Assessment:    Generalized weakness secondary to #2 and #3.  Acute hypoglycemia, POA.  Severe bradycardia possibly related to medications in the setting of worsening renal failure.  Progressive worsening of chronic kidney disease stage IV.  Chronic venous stasis of the lower extremities with multiple skin ulcers, POA..  Chronic systolic heart failure with ejection fraction of 35%.  Paroxysmal atrial fibrillation on apixaban.  Chronic cor pulmonale.  Chronic hypoxic respiratory failure on home oxygen.  Diabetes mellitus type 2 with  nephropathy.  Morbid obesity with a BMI of 57.  Anemia of chronic kidney disease.  Functional quadriplegia.  Acquired hypothyroidism.    Plan:    Generalized weakness/hypoglycemia/bradycardia.  -Unfortunately, patient has multiple comorbidities and has progressive decline in her health in the last several months.  Her current symptomatology is likely due to a combination of hypoglycemia, bradycardia and congestive heart failure.  She may also have underlying pneumonia and we are waiting for official chest x-ray read.  -We will hold amiodarone and metoprolol at this time until her heart rate improves.  -We will consult physical and occupational therapy as well as case management services for safe discharge planning.    Progressive worsening of chronic kidney disease stage IV.  -Patient does have significant volume overload and we will consult Dr. Cutler from nephrology for further recommendations.  -We will avoid nephrotoxic agents.    Suspected bilateral pneumonia.  -An official report is currently pending.  Patient states that she is having cough and yellow sputum.  -We will go ahead and get blood cultures and start her on IV antibiotics therapy with Rocephin and azithromycin.  -She will be placed on lactobacillus supplements.    Overall patient's prognosis is extremely poor due to her progressive decline in health and multiple comorbidities especially renal failure, heart failure and morbid obesity.    Patient during her last admission wanted to be DNR and did not want dialysis.  She confirmed that she is DNR at this time.    I have discussed the patient's condition and treatment plan with her niece Val at length over the phone.  She definitely thinks that the patient cannot take care of herself at this stage and will at least need to go to short-term rehabilitation.      Risk Assessment: High due to worsening bradycardia and renal failure.  DVT Prophylaxis: Apixaban  Code Status: DNR/DNI  Diet: Diabetic  renal.    Advance Care Planning      ACP discussion was held with the patient during this visit. Patient does not have an advance directive, information provided.           Samson Reyes MD  22  17:06 EDT    Dictated utilizing Dragon dictation.    Electronically signed by Samson Reyes MD at 22 1683          Physician Progress Notes (last 48 hours)      Cheyanne Valencia DO at 04/10/22 1423              [facility]    [facility]    Name:  Millicent Mckeon   Age:  61 y.o.  Sex:  female  :  1960  MRN:  [id number]   Visit Number:  [id number]  Admission Date:  3/30/2022  Date Of Service:  04/10/22  Primary Care Physician:  Marianela Albright APRN     LOS: 9 days :    Chief Complaint:      Follow-up weakness, renal failure    Subjective:    Patient was seen again this morning.  She continues to reiterate that she is ready to go home.  I discussed that she has not been out of the bed any, has not been participating with physical therapy, she states she can do all this at home.  Denies any shortness of breath, fevers or chills.    Hospital Course:    The patient is a 61-year-old chronically ill-appearing female with history of atrial fibrillation, chronic systolic congestive heart failure, CKD stage IV, chronic cor pulmonale, morbid obesity, functional quadriplegia who had presented to the emergency room with generalized weakness.  She had apparently been found at home disheveled and multiple skin sores and wounds.  She had had multiple treatments lately for cellulitis of the lower extremity and hypoglycemia.  Apparently lives at home per family report.    Initial work-up was demonstrating bradycardia, hypoglycemia, with creatinine 3.74 and a BUN of 75.  Hemoglobin 10.8.  Chest x-ray show cardiomegaly and bilateral opacity in the lower zones.  Concern for pneumonia and she was given azithromycin and Rocephin.  She did develop some low blood pressures and became hypothermic  upon admission.  She was started on midodrine and IV Lasix and had Wilkinson catheter placed.  There was concern for sepsis due to her recent hospitalization she was started on Zosyn and vancomycin.  She had a left internal jugular central venous line placed on 3/31/2022.  She subsequent had improvement in her blood pressures.  Her MRSA swab was negative, vancomycin was discontinued.  She also had some improvement in her urine output.  She was noted to have overall worsening kidney function, discussions had about dialysis.  Patient was seen by palliative care services.  She did elect to be a DNR/DNI.  After discussion with patient's niece, patient is now in agreement with trial of dialysis and will plan on having a tunneled dialysis catheter placed.  This was placed on 4/5/2022 and she started hemodialysis.  Case management consulted to work on outpatient dialysis set up.    Review of Systems:     All systems were reviewed and negative except as mentioned in subjective, assessment and plan.    Vital Signs:    Temp:  [97.3 °F (36.3 °C)-98.2 °F (36.8 °C)] 97.4 °F (36.3 °C)  Heart Rate:  [65-92] 90  Resp:  [16-17] 16  BP: (106-142)/(62-89) 142/81    Intake and output:    I/O last 3 completed shifts:  In: 960 [P.O.:960]  Out: 300 [Urine:300]  I/O this shift:  In: 240 [P.O.:240]  Out: -     Physical Examination:    General Appearance:  Alert and cooperative.  No acute distress.  Chronically ill-appearing   Head:  Atraumatic and normocephalic.   Eyes: Conjunctivae and sclerae normal, no icterus. No pallor.   Throat: No oral lesions, no thrush, oral mucosa moist.   Neck: Supple, trachea midline, no thyromegaly.   Lungs:    Breath sounds diminished.   Heart:  Normal S1 and S2, no murmur, no gallop, no rub. No JVD.   Abdomen:   Normal bowel sounds, no masses, no organomegaly. Soft, nontender, nondistended, no rebound tenderness.  Obese, Wilkinson catheter noted   Extremities: Supple, 2+ lower edema unchanged, no cyanosis, no  clubbing.  Venous insufficiency noted.   Skin:  Patient has multiple areas of erythema of the lower extremities, scabs, ulcerations of the skin.   Neurologic: Alert and oriented x 3. No facial asymmetry. Moves all four limbs. No tremors.       Laboratory results:    Results from last 7 days   Lab Units 04/10/22  0541 04/07/22  0540 04/06/22  0751 04/05/22  0537 04/04/22  0955   SODIUM mmol/L 140 135* 137   < > 137   POTASSIUM mmol/L 4.3 3.9 3.6   < > 4.1   CHLORIDE mmol/L 103 100 98   < > 95*   CO2 mmol/L 22.4 19.8* 22.4   < > 23.2   BUN mg/dL 45* 45* 64*   < > 97*   CREATININE mg/dL 3.32* 3.02* 3.81*   < > 5.03*   CALCIUM mg/dL 8.8 8.4* 8.2*   < > 7.6*   BILIRUBIN mg/dL  --   --   --   --  0.6   ALK PHOS U/L  --   --   --   --  225*   ALT (SGPT) U/L  --   --   --   --  81*   AST (SGOT) U/L  --   --   --   --  61*   GLUCOSE mg/dL 139* 140* 142*   < > 428*    < > = values in this interval not displayed.     Results from last 7 days   Lab Units 04/10/22  0541 04/09/22  0541 04/08/22  0530   WBC 10*3/mm3 6.44 6.34 6.05   HEMOGLOBIN g/dL 9.9* 9.8* 9.9*   HEMATOCRIT % 33.2* 32.4* 32.7*   PLATELETS 10*3/mm3 100* 72* 64*                     I have reviewed the patient's laboratory results.    Radiology results:    No radiology results from the last 24 hrs  I have reviewed the patient's radiology reports.    Medication Review:     I have reviewed the patient's active and prn medications.     Problem List:      Hypoglycemia    Bilateral edema of lower extremity    Essential hypertension    Acquired hypothyroidism    Type 2 diabetes mellitus with nephropathy (HCC)    Cellulitis of both lower extremities    Anemia due to stage 4 chronic kidney disease (HCC)    Chronic diastolic congestive heart failure (HCC)    Cor pulmonale, chronic (HCC)    Chronic kidney disease, stage IV (severe) (HCC)    Bradycardia      Assessment:    Sepsis with generalized weakness, hypothermia and hypotension secondary to #2,POA, resolved.  Bilateral  lower extremity cellulitis, POA, improving.  Acute hypoglycemia, POA, resolved.  Severe bradycardia possibly related to medications in the setting of worsening renal failure, resolved.  Progressive worsening of chronic kidney disease stage IV with volume overload, POA, improving.  Chronic venous stasis of the lower extremities with multiple skin ulcers, POA..  Chronic systolic heart failure with ejection fraction of 35%.  Paroxysmal atrial fibrillation on apixaban.  Chronic cor pulmonale.  Chronic hypoxic respiratory failure on home oxygen.  Diabetes mellitus type 2 with nephropathy.  Morbid obesity with a BMI of 57.  Anemia of chronic kidney disease.  Functional quadriplegia.  Acquired hypothyroidism.  Sacral deep tissue injury present on admission.      Plan:    Sepsis/hypothermia/hypotension.  Initially on empiric antibiotics with Zosyn, switched to Augmentin and completed treatment.     Generalized weakness/hypoglycemia/bradycardia.    Likely multifactorial in setting of severe multiple comorbidities, functional quadriplegia, worsening renal and congestive heart failure noted.  Her Toprol-XL was restarted.       Progressive worsening of chronic kidney disease stage IV.  Patient has been receiving hemodialysis after having a tunneled dialysis catheter placed.  Waiting to hear back for outpatient dialysis set up.     Diabetes mellitus type 2.  A1c of 8.5.  She continues on Levemir 20 units twice a day with aggressive SSI protocol.    Continue with hemodialysis.  Case management consulted, waiting on chair time and outpatient dialysis set up.  Once arranged, patient can likely be discharged home, as patient refuses rehab placement or SNF.    DVT Prophylaxis: Eliquis is on hold.  Code Status: DNR/DNI  Diet: Renal  Discharge Plan: TBD    Cheyanne Valencia DO  04/10/22  14:23 EDT    Dictated utilizing Dragon dictation.      Electronically signed by Cheyanne Valencia DO at 04/10/22 7290     Milad Leone MD at  04/10/22 0741                [facility] Progress Note  [facility]. [place]        Patient Name: Millicent Mckeon  : 1960  MRN: [id number]   LOS: 9 days    Patient Care Team:  Marianela Albright APRN as PCP - General (Family Medicine)  Geremias Shepherd MD as Consulting Physician (General Surgery)  Lisa Cardoso MD as Surgeon (General Surgery)    Chief Complaint:    Chief Complaint   Patient presents with   • Weakness - Generalized     Unable to stand     Primary Care Physician:  Marianela Albright APRN  Date of admission: 3/30/2022    Subjective     Interval History:   Events noted from last 24 hours.  She drowsy and sleepy this morning.  Easily arousable she denies having any chest pain or shortness of breath.  Denies any fever or chills.  She does feel that things are better still not actively participating in physical therapy.  Outpatient dialysis schedule is still not available.  Clinically appears to be improving.    Review of Systems:   As noted above    Objective     Vitals:   Temp:  [97.3 °F (36.3 °C)-98.2 °F (36.8 °C)] 98.2 °F (36.8 °C)  Heart Rate:  [] 78  Resp:  [16-18] 16  BP: (106-133)/(62-87) 122/62    Intake/Output Summary (Last 24 hours) at 4/10/2022 0741  Last data filed at 2022 1700  Gross per 24 hour   Intake 840 ml   Output 200 ml   Net 640 ml       Physical Exam:    General Appearance: alert,  no acute distress   Skin: warm and dry  HEENT: oral mucosa normal, nonicteric sclera  Neck: supple, no JVD  Lungs: Still have decreased breath sounds at both bases.  Heart: RRR, normal S1 and S2  Abdomen: Obese, soft to firm, nontender, nondistended, she does have dependent edema mostly on the back of her body including sacral area.  : no palpable bladder  Extremities: 1 + edema, mostly it is dependent edema, no cyanosis or clubbing  Neuro: Awake and interactive, randomly move extremities.    Scheduled Meds:     Current Facility-Administered Medications    Medication Dose Route Frequency Provider Last Rate Last Admin   • acetaminophen (TYLENOL) tablet 650 mg  650 mg Oral Q4H PRN Cheyanne Valencia DO   650 mg at 03/31/22 0629    Or   • acetaminophen (TYLENOL) 160 MG/5ML solution 650 mg  650 mg Oral Q4H PRN Cheyanne Valencia DO        Or   • acetaminophen (TYLENOL) suppository 650 mg  650 mg Rectal Q4H PRN Cheyanne Valencia DO       • amoxicillin-clavulanate (AUGMENTIN) 500-125 MG per tablet 500 mg  1 tablet Oral Q12H Cheyanne Valencia DO   500 mg at 04/09/22 2042   • apixaban (ELIQUIS) tablet 2.5 mg  2.5 mg Oral Q12H Doc Keith MD   2.5 mg at 04/09/22 2042   • aspirin chewable tablet 81 mg  81 mg Oral Daily Cheyanne Valencia DO   81 mg at 04/09/22 0923   • b complex-vitamin c-folic acid (NEPHRO-LOIS) tablet 1 tablet  1 tablet Oral Daily Cheyanne Valencia DO   1 tablet at 04/09/22 0925   • dextrose (D50W) (25 g/50 mL) IV injection 25 g  25 g Intravenous Q15 Min PRN Cheyanne Valencia DO       • dextrose (D50W) (25 g/50 mL) IV injection 50 mL  50 mL Intravenous Q1H PRN Cheyanne Valencia DO   50 mL at 03/31/22 0630   • dextrose (GLUTOSE) oral gel 1 tube  1 tube Oral Q15 Min PRN Cheyanne Valencia DO       • docusate sodium (COLACE) capsule 100 mg  100 mg Oral BID Cheyanne Valencia DO   100 mg at 04/09/22 2041   • escitalopram (LEXAPRO) tablet 10 mg  10 mg Oral Daily Milad Leone MD   10 mg at 04/09/22 0926   • ferrous sulfate EC tablet 324 mg  324 mg Oral BID With Meals Cheyanne Valencia DO   324 mg at 04/09/22 1826   • glucagon (human recombinant) (GLUCAGEN DIAGNOSTIC) injection 1 mg  1 mg Subcutaneous PRN Cheyanne Valencia DO       • heparin (porcine) injection 1,000 Units  1,000 Units Intracatheter KMN Cheyanne Valencia DO       • heparin (porcine) injection 1,000 Units  1,000 Units Intracatheter Milad Bianchi MD       • heparin (porcine) injection 1,000 Units  1,000 Units  Intracatheter PRN Milad Leone MD       • insulin aspart (novoLOG) injection 0-14 Units  0-14 Units Subcutaneous TID AC Cheyanne Valencia DO   3 Units at 04/09/22 1153   • insulin detemir (LEVEMIR) injection 10 Units  10 Units Subcutaneous Q12H Cheyanne Valencia DO   10 Units at 04/09/22 2140   • ipratropium-albuterol (DUO-NEB) nebulizer solution 3 mL  3 mL Nebulization Q4H PRN Cheyanne Valencia DO       • isosorbide mononitrate (IMDUR) 24 hr tablet 30 mg  30 mg Oral Daily Cheyanne Valencia DO   30 mg at 04/09/22 0924   • ketoconazole (NIZORAL) 2 % cream 1 application  1 application Topical BID Cheyanne Valencia DO   1 application at 04/09/22 2119   • lactobacillus acidophilus (RISAQUAD) capsule 1 capsule  1 capsule Oral BID Cheyanne Valencia DO   1 capsule at 04/09/22 2041   • levothyroxine (SYNTHROID, LEVOTHROID) tablet 150 mcg  150 mcg Oral Daily Cheyanne Valencia DO   150 mcg at 04/09/22 0925   • lidocaine (LIDODERM) 5 % 1 patch  1 patch Transdermal Q24H Cheyanne Valencia DO   1 patch at 04/09/22 2042   • metoprolol succinate XL (TOPROL-XL) 24 hr tablet 125 mg  125 mg Oral Q24H Milad Leone MD   125 mg at 04/09/22 0926   • mupirocin (BACTROBAN) 2 % cream 1 application  1 application Topical TID Cheyanne Valencia DO   1 application at 04/09/22 2119   • ondansetron (ZOFRAN) injection 4 mg  4 mg Intravenous Q6H PRN Cheyanne Valencia DO       • pantoprazole (PROTONIX) EC tablet 40 mg  40 mg Oral Daily Cheyanne Valencia DO   40 mg at 04/09/22 0925   • QUEtiapine (SEROquel) tablet 25 mg  25 mg Oral Nightly Milad Leone MD   25 mg at 04/09/22 2042   • silver sulfadiazine (SILVADENE, SSD) 1 % cream 1 application  1 application Topical Q12H Cheyanne Valencia DO   1 application at 04/09/22 2120   • sodium chloride 0.9 % flush 10 mL  10 mL Intravenous PRN Cheyanne Valencia DO       • sodium chloride 0.9 % flush 10 mL  10 mL Intravenous  Q12H Karrick, Cheyanne Syed, DO   10 mL at 04/09/22 2120   • sodium chloride 0.9 % flush 10 mL  10 mL Intravenous Q12H Karrick, Cheyanne Syed, DO   10 mL at 04/09/22 2120   • sodium chloride 0.9 % flush 10 mL  10 mL Intravenous Q12H Karrick, Cheyanne Syed, DO   10 mL at 04/09/22 2042   • sodium chloride 0.9 % flush 10 mL  10 mL Intravenous PRN Karrick, Cheyanne Syed, DO       • sodium chloride 0.9 % flush 20 mL  20 mL Intravenous PRN Karrick, Cheyanne Syed, DO       • sodium chloride 0.9 % flush 3 mL  3 mL Intravenous Q12H Karrick, Cheyanne Syed, DO   3 mL at 04/09/22 2121   • sodium chloride 0.9 % flush 3-10 mL  3-10 mL Intravenous PRN Karrick, Cheyanne Syed, DO       • torsemide (DEMADEX) tablet 100 mg  100 mg Oral Daily Milad Leone MD   100 mg at 04/09/22 0924       amoxicillin-clavulanate, 1 tablet, Oral, Q12H  apixaban, 2.5 mg, Oral, Q12H  aspirin, 81 mg, Oral, Daily  b complex-vitamin c-folic acid, 1 tablet, Oral, Daily  docusate sodium, 100 mg, Oral, BID  escitalopram, 10 mg, Oral, Daily  ferrous sulfate, 324 mg, Oral, BID With Meals  insulin aspart, 0-14 Units, Subcutaneous, TID AC  insulin detemir, 10 Units, Subcutaneous, Q12H  isosorbide mononitrate, 30 mg, Oral, Daily  ketoconazole, 1 application, Topical, BID  lactobacillus acidophilus, 1 capsule, Oral, BID  levothyroxine, 150 mcg, Oral, Daily  lidocaine, 1 patch, Transdermal, Q24H  metoprolol succinate XL, 125 mg, Oral, Q24H  mupirocin, 1 application, Topical, TID  pantoprazole, 40 mg, Oral, Daily  QUEtiapine, 25 mg, Oral, Nightly  silver sulfadiazine, 1 application, Topical, Q12H  sodium chloride, 10 mL, Intravenous, Q12H  sodium chloride, 10 mL, Intravenous, Q12H  sodium chloride, 10 mL, Intravenous, Q12H  sodium chloride, 3 mL, Intravenous, Q12H  torsemide, 100 mg, Oral, Daily        IV Meds:        Results Reviewed:   I have personally reviewed the results from the time of this admission to 4/10/2022 07:41 EDT     Results from last 7 days   Lab  Units 04/10/22  0541 04/07/22  0540 04/06/22  0751 04/05/22  0537 04/04/22  0955   SODIUM mmol/L 140 135* 137   < > 137   POTASSIUM mmol/L 4.3 3.9 3.6   < > 4.1   CHLORIDE mmol/L 103 100 98   < > 95*   CO2 mmol/L 22.4 19.8* 22.4   < > 23.2   BUN mg/dL 45* 45* 64*   < > 97*   CREATININE mg/dL 3.32* 3.02* 3.81*   < > 5.03*   CALCIUM mg/dL 8.8 8.4* 8.2*   < > 7.6*   BILIRUBIN mg/dL  --   --   --   --  0.6   ALK PHOS U/L  --   --   --   --  225*   ALT (SGPT) U/L  --   --   --   --  81*   AST (SGOT) U/L  --   --   --   --  61*   GLUCOSE mg/dL 139* 140* 142*   < > 428*    < > = values in this interval not displayed.       Estimated Creatinine Clearance: 25.3 mL/min (A) (by C-G formula based on SCr of 3.32 mg/dL (H)).    Results from last 7 days   Lab Units 04/10/22  0541 04/06/22  0751 04/05/22  0537   PHOSPHORUS mg/dL 4.3 4.5 5.3*             Results from last 7 days   Lab Units 04/10/22  0541 04/09/22  0541 04/08/22  0530 04/07/22  0540 04/06/22  0751   WBC 10*3/mm3 6.44 6.34 6.05 4.82 4.88   HEMOGLOBIN g/dL 9.9* 9.8* 9.9* 10.4* 9.8*   PLATELETS 10*3/mm3 100* 72* 64* 60* 55*             Brief Urine Lab Results  (Last result in the past 365 days)      Color   Clarity   Blood   Leuk Est   Nitrite   Protein   CREAT   Urine HCG        03/30/22 1705 Yellow   Clear   Trace   Negative   Negative   >=300 mg/dL (3+)                 No results found for: UTPCR    Imaging Results (Last 24 Hours)     ** No results found for the last 24 hours. **              Assessment / Plan     ASSESSMENT:    End-stage renal disease: After denying to have dialysis finally she has agreed, because of multiple issues with the belly peritoneal dialysis catheter was not an option at this point, she ended up with a tunneled dialysis catheter.  We will make arrangements for her outpatient dialysis, once she is better we will have further discussion with the family if they still are interested and wants to do her home peritoneal dialysis.    Anemia:  Hemoglobin appears to be fairly stable, she does have iron deficiency and because of acute infection cannot give any IV iron continue with oral iron and will start BI as an outpatient.    Chronic diastolic congestive heart failure (HCC): We will try to diurese with high-dose diuretics and see if she will respond to it.  Volume status significantly better since on dialysis will take another 2 to 3 L until she gets to euvolemia.    Cor pulmonale, chronic (HCC): Longstanding history of untreated sleep apnea might be contributing.    Hypoglycemia: Oral hypoglycemic agents with prolonged effect from renal failure likely the cause.    Bilateral edema of lower extremity: She does not have much lower extremity edema most of the edema is in the abdominal and chest area appears to be having generalized anasarca.    Essential hypertension: Blood pressure is on the low side we will hold off all blood pressure medications we will go ahead and start her on midodrine to maintain blood pressure while aggressively diuresing her.  I will go ahead and stop the hydralazine today and optimize her volume status and continue with the metoprolol.    Acquired hypothyroidism: Continue home Synthroid dose.    Type 2 diabetes mellitus with nephropathy (HCC): As per hospitalist service.    Cellulitis of both lower extremities: IV antibiotics have been started.    Bradycardia: It has resolved continue to increase beta-blocker for blood pressure control.      PLAN:  Her outpatient schedule is going to.  Tuesday Thursday Saturday, still do not have exact time.  Continue with torsemide 100 mg a day.  Likely will keep the blood pressure more stable as well as continue to improve the volume status as well.  It does appear that Seroquel and Lexapro has helped her.  Once all the arrangements are made for her transportation and outpatient dialysis she will be okay to go home.  It was brought to my attention that she does not actively participate in  physical therapy cannot get out of bed by herself.  She will need Bunny lift and a wheelchair transportation as well as wheelchair to get to and from dialysis.  I will go ahead and plan on doing dialysis on Monday after that she can be set up for Thursday and Saturday schedule after discharge.  Details were discussed with the patient.     Details were also discussed with the hospitalist service as well as nursing staff.  Continue with rest of the current treatment plan, and monitor with surveillance labs.  Further recommendations will depend on clinical course of the patient during the current hospitalization.     Thank you for involving us in the care of Millicent Mckeon.  Please feel free to call with any questions.    Milad Leone MD  04/10/22  07:41 EDT       [facility]  [phone]      Much of this encounter note is an electronic transcription/translation of spoken language to printed text. The electronic translation of spoken language may permit erroneous, or at times, nonsensical words or phrases to be inadvertently transcribed; Although I have reviewed the note for such errors, some may still exist.                 Electronically signed by Milad Leone MD at 04/10/22 0923     Doc Keith MD at 22 1356              [facility]    Name:  Millicent Mckeon   Age:  61 y.o.  Sex:  female  :  1960  MRN:  [id number]   Visit Number:  [id number]  Admission Date:  3/30/2022  Date Of Service:  22  Primary Care Physician:  Marianela Albright APRN     LOS: 8 days :    Chief Complaint:      Follow-up weakness, renal failure    Subjective:    No acute events overnight. She is eating well. No n/v. She is not willing to have a discussion about going any where else but home when she is discharged from the hospital.     Hospital Course:      The patient is a 61-year-old chronically ill-appearing female with history of atrial fibrillation,  chronic systolic congestive heart failure, CKD stage IV, chronic cor pulmonale, morbid obesity, functional quadriplegia who had presented to the emergency room with generalized weakness.  She had apparently been found at home disheveled and with multiple skin sores and wounds.  She had had multiple treatments recently for cellulitis of the lower extremity and hypoglycemia.       Initial work-up was demonstrating bradycardia, hypoglycemia, with creatinine 3.74 and a BUN of 75.  Hemoglobin 10.8.  Chest x-ray show cardiomegaly and bilateral opacity in the lower zones.  Concern for pneumonia and she was given azithromycin and Rocephin.  She did develop some low blood pressures and became hypothermic upon admission.  She was started on midodrine and IV Lasix and had Wilkinson catheter placed.  There was concern for sepsis due to her recent hospitalization she was started on Zosyn and vancomycin.  She had a left internal jugular central venous line placed on 3/31/2022.  She subsequent had improvement in her blood pressures.  Her MRSA swab was negative, vancomycin was discontinued.  She also had some improvement in her urine output.  She was noted to have overall worsening kidney function, discussions had about dialysis.  Patient was seen by palliative care services.  She did elect to be a DNR/DNI.  After discussion with patient's niece, patient is now in agreement with trial of dialysis and will plan on having a tunneled dialysis catheter placed.  This was placed on 4/5/2022 and she started hemodialysis.  Case management consulted to work on outpatient dialysis set up; earliest this would happen is probably Monday. Social situation is difficult; she is adamant about going home but is too weak to get out of bed on her own and it seems that no family members are able to live with her. Adult Protective Services will be contacted.        Review of Systems:     All systems were reviewed and negative except as mentioned in  subjective, assessment and plan.    Vital Signs:    Temp:  [97.4 °F (36.3 °C)-97.8 °F (36.6 °C)] 97.8 °F (36.6 °C)  Heart Rate:  [] 100  Resp:  [15-18] 18  BP: (112-127)/(68-86) 112/69    Intake and output:    I/O last 3 completed shifts:  In: 1080 [P.O.:1080]  Out: 425 [Urine:425]  I/O this shift:  In: 480 [P.O.:480]  Out: 150 [Urine:150]    Physical Examination:    General Appearance:  Alert and cooperative. NAD, Chronically ill appearing.   Head:  Atraumatic and normocephalic.   Eyes: Conjunctivae and sclerae normal, no icterus. No pallor.   Throat: No oral lesions, oral mucosa moist.   Neck: Supple, trachea midline, no thyromegaly.   Lungs:   Breath sounds heard bilaterally equally.  No wheezing or crackles. No Pleural rub or bronchial breathing.   Heart:  Normal S1 and S2, no murmur, no gallop, no rub. No JVD.   Abdomen:   Normal bowel sounds, no masses, no organomegaly. Soft, nontender, nondistended, no rebound tenderness.   Extremities: Supple, no edema, no cyanosis, no clubbing.   Skin: No bleeding or rash.   Neurologic: Alert. No facial asymmetry. Moves all four limbs. No tremors.      Laboratory results:    Results from last 7 days   Lab Units 04/07/22  0540 04/06/22  0751 04/05/22  0537 04/04/22  0955 04/03/22  0604   SODIUM mmol/L 135* 137 140 137 141   POTASSIUM mmol/L 3.9 3.6 3.7 4.1 4.5   CHLORIDE mmol/L 100 98 98 95* 97*   CO2 mmol/L 19.8* 22.4 22.5 23.2 21.2*   BUN mg/dL 45* 64* 104* 97* 101*   CREATININE mg/dL 3.02* 3.81* 5.01* 5.03* 4.69*   CALCIUM mg/dL 8.4* 8.2* 7.9* 7.6* 7.9*   BILIRUBIN mg/dL  --   --   --  0.6 0.7   ALK PHOS U/L  --   --   --  225* 250*   ALT (SGPT) U/L  --   --   --  81* 73*   AST (SGOT) U/L  --   --   --  61* 77*   GLUCOSE mg/dL 140* 142* 191* 428* 251*     Results from last 7 days   Lab Units 04/09/22  0541 04/08/22  0530 04/07/22  0540   WBC 10*3/mm3 6.34 6.05 4.82   HEMOGLOBIN g/dL 9.8* 9.9* 10.4*   HEMATOCRIT % 32.4* 32.7* 32.9*   PLATELETS 10*3/mm3 72* 64* 60*      Results from last 7 days   Lab Units 04/03/22  0604   INR  1.84*                 I have reviewed the patient's laboratory results.    Radiology results:    No radiology results from the last 24 hrs  I have reviewed the patient's radiology reports.    Medication Review:     I have reviewed the patient's active and prn medications.     Problem List:      Hypoglycemia    Bilateral edema of lower extremity    Essential hypertension    Acquired hypothyroidism    Type 2 diabetes mellitus with nephropathy (HCC)    Cellulitis of both lower extremities    Anemia due to stage 4 chronic kidney disease (HCC)    Chronic diastolic congestive heart failure (HCC)    Cor pulmonale, chronic (HCC)    Chronic kidney disease, stage IV (severe) (HCC)    Bradycardia      Assessment/Plan:    Social: The patient is adamant about going home but is too weak to get out of bed on her own and it seems that no family members are able to live with her. Will consult case management for assistance with placement. Adult Protective Services will be contacted.     Progressive worsening of CKD a/w volume overload  -HD initiated on 4/5  -torsemide po 100mg qd  -continue clay    Sepsis from b/l LE cellulitis, resolved  -s/p 5 days of zosyn and transitioned to augmentin; today is day 5/5  -blood cultures collected on 3/30/22 no growth at 5 days    Symptomatic bradycardia, resolved  -continuing her toprol-xl  -holding her amiodarone    DVT Prophylaxis: Eliquis  Code Status: DNR/DNI  Diet: Renal  Discharge Plan: Pending hemodialysis chair which may be available on Monday. Also pending placement. She could potentially be discharged home with home health, a wheelchair and beatrice lift; but only if she had support from family or friends which she doesn't seem to have.    Doc Keith MD  04/09/22  13:57 EDT        Electronically signed by Doc Keith MD at 04/09/22 8753          Physical Therapy Notes (last 48 hours)      Xochitl Bradford, PT at 04/09/22 1328   "Version 1 of 1       Goal Outcome Evaluation:  Plan of Care Reviewed With: patient, other (see comments) (pt's niece)           Outcome Evaluation: PT treatment was brief this p.m. as pt initially agreed to work with therapist but as therapist began adjusting bed remove pillows to assist pt to EOB pt stated she was too tired to sit up. Therapist tried to encourage pt to try to sit up since pt's primary goal is to go home. Pt states that she would be going home tomorrow. Therapist stated that to go home alone would be quite a challeng to pt since she has yet to be able to move herself independently. Pt stated, \"That it doesn't matter, I have no steps and I have a BSC.\" Pt then covered her head with a sheet and refused to talk or work with therapist. Therapist stated to pt that not talking about her current physical limitations or not trying to work on them would not make them go away. Pt kept repeating, \"I'm going home. If I have to stay here I won't do anything until you send me home.\" At present pt needs full assist of 1-2 PA to be safe with all bed mobility and max assist with ADLs. Nursing informed of pt declining to continue PT at this time. Will plan to try back tomorrow and proceed as pt will permit.    Electronically signed by Xochitl Bradford PT at 22 1508     Xochitl Bradford PT at 22 1328  Version 1 of 1         Patient Name: Millicent Mckeon  : 1960    MRN: [id number]                              Today's Date: 2022       Admit Date: 3/30/2022    Visit Dx:     ICD-10-CM ICD-9-CM   1. Hypoglycemia  E16.2 251.2   2. Bradycardia  R00.1 427.89   3. Failure to thrive in adult  R62.7 783.7   4. Pneumonia of right lung due to infectious organism, unspecified part of lung  J18.9 483.8     Patient Active Problem List   Diagnosis   • Altered mental status   • Bilateral edema of lower extremity   • Cellulitis of lower extremity   • Acute on chronic renal failure (HCC)   • GERD (gastroesophageal " reflux disease)   • Hyperkalemia   • Essential hypertension   • Acquired hypothyroidism   • Type 2 diabetes mellitus with nephropathy (HCC)   • Vitamin B12 deficiency   • Cellulitis of both lower extremities   • Anemia due to stage 4 chronic kidney disease (HCC)   • Acute renal failure (ARF) (HCC)   • Hyperkalemia   • Impaired functional mobility and activity tolerance   • Chronic diastolic congestive heart failure (HCC)   • Paroxysmal atrial fibrillation with rapid ventricular response (HCC)   • Pulmonary hypertension (HCC)   • Cor pulmonale, chronic (HCC)   • Chronic venous hypertension involving both sides   • Lymphedema of both lower extremities   • Obesity, morbid, BMI 50 or higher (HCC)   • A-fib (HCC)   • CKD (chronic kidney disease) stage 3, GFR 30-59 ml/min (HCC)   • Acute bronchitis due to other specified organisms   • Tachycardia-bradycardia syndrome (HCC)   • Pressure ulcer of right heel, unstageable (HCC)   • Mucopurulent chronic bronchitis (HCC)   • Acute pulmonary edema (HCC)   • Thrombocytopenia, unspecified (HCC)   • Chronic kidney disease, stage IV (severe) (HCC)   • Hypoglycemia   • Bradycardia     Past Medical History:   Diagnosis Date   • A-fib (HCC)    • Anemia 10/2/2018   • Diabetes mellitus (HCC)    • Disease of thyroid gland    • GERD (gastroesophageal reflux disease)    • Gout    • History of transfusion    • Hypertension      Past Surgical History:   Procedure Laterality Date   • EYE SURGERY     • INCISION AND DRAINAGE LEG Right 1/14/2019    Procedure: Right heel incision and drainage with graft application;  Surgeon: Ar Rueda DPM;  Location: [facility] OR;  Service: Podiatry   • INSERTION HEMODIALYSIS CATHETER N/A 4/5/2022    Procedure: HEMODIALYSIS CATHETER INSERTION;  Surgeon: Jean Carlos Guadalupe MD;  Location: [facility] OR;  Service: General;  Laterality: N/A;   • LEG SURGERY        General Information    No documentation.                Mobility    No documentation.                 "Obj/Interventions    No documentation.                Goals/Plan    No documentation.                Clinical Impression     Row Name 04/09/22 1328          Pain    Posttreatment Pain Rating 0/10 - no pain  -TW     Row Name 04/09/22 1328          Plan of Care Review    Outcome Evaluation PT treatment was brief this p.m. as pt initially agreed to work with therapist but as therapist began adjusting bed remove pillows to assist pt to EOB pt stated she was too tired to sit up. Therapist tried to encourage pt to try to sit up since pt's primary goal is to go home. Pt states that she would be going home tomorrow. Therapist stated that to go home alone would be quite a challeng to pt since she has yet to be able to move herself independently. Pt stated, \"That it doesn't matter, I have no steps and I have a BSC.\" Pt then covered her head with a sheet and refused to talk or work with therapist. Therapist stated to pt that not talking about her current physical limitations or not trying to work on them would not make them go away. Pt kept repeating, \"I'm going home. If I have to stay here I won't do anything until you send me home.\" At present pt needs full assist of 1-2 PA to be safe with all bed mobility and max assist with ADLs. Nursing informed of pt declining to continue PT at this time. Will plan to try back tomorrow and proceed as pt will permit.  -TW     Row Name 04/09/22 1328          Vital Signs    Pre Patient Position Supine  -TW     Intra Patient Position Supine  -TW     Post Patient Position Supine  -TW     Row Name 04/09/22 1328          Positioning and Restraints    Pre-Treatment Position in bed  -TW     Post Treatment Position bed  -TW     In Bed supine;call light within reach;encouraged to call for assist;notified ns  -TW           User Key  (r) = Recorded By, (t) = Taken By, (c) = Cosigned By    Initials Name Provider Type    Xochitl Barakat, PT Physical Therapist               Outcome Measures     Row " Name 04/09/22 1328 04/09/22 0835       How much help from another person do you currently need...    Turning from your back to your side while in flat bed without using bedrails? 3  -TW 3  -MT    Moving from lying on back to sitting on the side of a flat bed without bedrails? 2  -TW 2  -MT    Moving to and from a bed to a chair (including a wheelchair)? 1  -TW 1  -MT    Standing up from a chair using your arms (e.g., wheelchair, bedside chair)? 1  -TW 1  -MT    Climbing 3-5 steps with a railing? 1  -TW 1  -MT    To walk in hospital room? 1  -TW 1  -MT    AM-PAC 6 Clicks Score (PT) 9  -TW 9  -MT    Row Name 04/09/22 1328          Functional Assessment    Outcome Measure Options AM-PAC 6 Clicks Basic Mobility (PT)  -           User Key  (r) = Recorded By, (t) = Taken By, (c) = Cosigned By    Initials Name Provider Type    Krys Gomez, RN Registered Nurse    TW Xochitl Bradford, PT Physical Therapist                             Physical Therapy Education                 Title: PT OT SLP Therapies (In Progress)     Topic: Physical Therapy (In Progress)     Point: Mobility training (In Progress)     Learning Progress Summary           Patient Refuses, E, NL by TW at 4/9/2022 1328    Comment: Pt education about pt's current physical limitations and ways to address these limitations. Pt declined to sit on EOB and then declined to particpate further in today's treatment after reasons why it would not be safe to be at home alone presented to pt.      Show all documentation for this point (1)                 Point: Home exercise program (Done)     Learning Progress Summary           Patient Acceptance, E,D, DU,NR by TW at 4/3/2022 1011    Comment: Pt education for improving bed mobility technique as well as LE ther ex technique.                   Point: Body mechanics (Done)     Learning Progress Summary           Patient Acceptance, E,TB, VU by JR at 4/6/2022 1115    Comment: Encouraged upright standing, as she stands  "with roughly 70 degrees of hip flexion.                   Point: Precautions (Not Started)     Learner Progress:  Not documented in this visit.                      User Key     Initials Effective Dates Name Provider Type Discipline     03/23/22 -  Maribel Dorman, PT Physical Therapist PT    TW 06/16/21 -  Xochitl Bradford PT Physical Therapist PT              PT Recommendation and Plan  Planned Therapy Interventions (PT): balance training, bed mobility training, gait training, patient/family education, transfer training, strengthening  Plan of Care Reviewed With: patient, other (see comments) (pt's niece)  Outcome Evaluation: PT treatment was brief this p.m. as pt initially agreed to work with therapist but as therapist began adjusting bed remove pillows to assist pt to EOB pt stated she was too tired to sit up. Therapist tried to encourage pt to try to sit up since pt's primary goal is to go home. Pt states that she would be going home tomorrow. Therapist stated that to go home alone would be quite a challeng to pt since she has yet to be able to move herself independently. Pt stated, \"That it doesn't matter, I have no steps and I have a BSC.\" Pt then covered her head with a sheet and refused to talk or work with therapist. Therapist stated to pt that not talking about her current physical limitations or not trying to work on them would not make them go away. Pt kept repeating, \"I'm going home. If I have to stay here I won't do anything until you send me home.\" At present pt needs full assist of 1-2 PA to be safe with all bed mobility and max assist with ADLs. Nursing informed of pt declining to continue PT at this time. Will plan to try back tomorrow and proceed as pt will permit.     Time Calculation:    PT Charges     Row Name 04/09/22 1328             Time Calculation    Start Time 1321  -TW      Stop Time 1328  -TW      Time Calculation (min) 7 min  -TW      PT Received On 04/09/22  -TW              Timed " "Charges    07603 - PT Therapeutic Activity Minutes 7  -TW              Total Minutes    Timed Charges Total Minutes 7  -TW       Total Minutes 7  -TW            User Key  (r) = Recorded By, (t) = Taken By, (c) = Cosigned By    Initials Name Provider Type    TW Xochitl Bradford PT Physical Therapist                  PT G-Codes  Outcome Measure Options: AM-PAC 6 Clicks Basic Mobility (PT)  AM-PAC 6 Clicks Score (PT): 9  AM-PAC 6 Clicks Score (OT): 15    Xochitl Bradford PT  4/9/2022      Electronically signed by Xochitl Bradford PT at 04/09/22 1509     Nguyen Chicas PTA at 04/10/22 0954  Version 1 of 1       Pt refused PT today when PTA suggested just getting up to chair to show she was ready to return to home pt stated\"I'm tired of that being throwed up in my face and I just want to kick their a_ _. PTA commented What? And pt stated the nurses , PTA replied it wasn't the nurses that wanted me to get you up the doctors want me to get you up to chair today if your able, pt replied I have a headache and I just want you to leave me alone. PTA departed at that point and wished pt a good day. PT to f/u with pt at later date      Electronically signed by Nguyen Chicas PTA at 04/10/22 1311       Occupational Therapy Notes (last 48 hours)  Notes from 04/09/22 1007 through 04/11/22 1007   No notes exist for this encounter.       "

## 2022-04-11 NOTE — PLAN OF CARE
Goal Outcome Evaluation:  Plan of Care Reviewed With: patient        Progress: improving  Outcome Evaluation: Patient demonstrates improving strength and balance as seen by increased mobility with decreased assistance needed.  She requires CGA for bed mobility, transfers and gait x 12 feet with RW.  She is able to perform seated therapeutic exercises x 20 repetitions with instructions for full AROM and decreased speed to improve muscle isolation.  She is in good spirits and pleased to be sitting in the chair at the end of the PT session.  She is expected to benefit from additional PT services per POC.

## 2022-04-11 NOTE — PLAN OF CARE
Goal Outcome Evaluation:  Plan of Care Reviewed With: patient      Dialysis today.  No reports of pain.  Vitals unremarkable.  No acute events this shift.

## 2022-04-11 NOTE — THERAPY TREATMENT NOTE
Patient Name: Millicent Mckeon  : 1960    MRN: [id number]                              Today's Date: 2022       Admit Date: 3/30/2022    Visit Dx:     ICD-10-CM ICD-9-CM   1. Hypoglycemia  E16.2 251.2   2. Bradycardia  R00.1 427.89   3. Failure to thrive in adult  R62.7 783.7   4. Pneumonia of right lung due to infectious organism, unspecified part of lung  J18.9 483.8     Patient Active Problem List   Diagnosis   • Altered mental status   • Bilateral edema of lower extremity   • Cellulitis of lower extremity   • Acute on chronic renal failure (HCC)   • GERD (gastroesophageal reflux disease)   • Hyperkalemia   • Essential hypertension   • Acquired hypothyroidism   • Type 2 diabetes mellitus with nephropathy ([facility])   • Vitamin B12 deficiency   • Cellulitis of both lower extremities   • Anemia due to stage 4 chronic kidney disease ([facility])   • Acute renal failure (ARF) ([facility])   • Hyperkalemia   • Impaired functional mobility and activity tolerance   • Chronic diastolic congestive heart failure ([facility])   • Paroxysmal atrial fibrillation with rapid ventricular response ([facility])   • Pulmonary hypertension ([facility])   • Cor pulmonale, chronic ([facility])   • Chronic venous hypertension involving both sides   • Lymphedema of both lower extremities   • Obesity, morbid, BMI 50 or higher ([facility])   • A-fib ([facility])   • CKD (chronic kidney disease) stage 3, GFR 30-59 ml/min ([facility])   • Acute bronchitis due to other specified organisms   • Tachycardia-bradycardia syndrome ([facility])   • Pressure ulcer of right heel, unstageable ([facility])   • Mucopurulent chronic bronchitis ([facility])   • Acute pulmonary edema ([facility])   • Thrombocytopenia, unspecified ([facility])   • Chronic kidney disease, stage IV (severe) ([facility])   • Hypoglycemia   • Bradycardia     Past Medical History:   Diagnosis Date   • A-fib ([facility])    • Anemia 10/2/2018   • Diabetes mellitus ([facility])    • Disease of thyroid gland    • GERD (gastroesophageal reflux disease)    • Gout    • History of transfusion     • Hypertension      Past Surgical History:   Procedure Laterality Date   • EYE SURGERY     • INCISION AND DRAINAGE LEG Right 1/14/2019    Procedure: Right heel incision and drainage with graft application;  Surgeon: Ar Rueda DPM;  Location: [facility];  Service: Podiatry   • INSERTION HEMODIALYSIS CATHETER N/A 4/5/2022    Procedure: HEMODIALYSIS CATHETER INSERTION;  Surgeon: Jean Carlos Guadalupe MD;  Location: [facility];  Service: General;  Laterality: N/A;   • LEG SURGERY        General Information     Row Name 04/11/22 1543          Physical Therapy Time and Intention    Document Type therapy note (daily note)  -     Mode of Treatment physical therapy  -     Row Name 04/11/22 1543          General Information    Patient Profile Reviewed yes  -           User Key  (r) = Recorded By, (t) = Taken By, (c) = Cosigned By    Initials Name Provider Type     Maribel Dorman, MIC Physical Therapist               Mobility     Row Name 04/11/22 1543          Bed Mobility    Bed Mobility supine-sit  -JR     Supine-Sit [place] (Bed Mobility) contact guard  -     Assistive Device (Bed Mobility) head of bed elevated;bed rails  -     Row Name 04/11/22 1543          Sit-Stand Transfer    Sit-Stand [place] (Transfers) contact guard  -     Assistive Device (Sit-Stand Transfers) walker, front-wheeled  -     Row Name 04/11/22 1543          Gait/Stairs (Locomotion)    [place] Level (Gait) contact guard  -JR     Assistive Device (Gait) walker, front-wheeled  -JR     Distance in Feet (Gait) 12  -JR     Deviations/Abnormal Patterns (Gait) base of support, wide;itzel decreased;gait speed decreased;stride length decreased  -JR     Bilateral Gait Deviations forward flexed posture;heel strike decreased  -JR           User Key  (r) = Recorded By, (t) = Taken By, (c) = Cosigned By    Initials Name Provider Type    Maribel Miguel PT Physical Therapist               Obj/Interventions     Row Name 04/11/22  1543          Motor Skills    Therapeutic Exercise knee;ankle  -JR     Row Name 04/11/22 1543          Knee (Therapeutic Exercise)    Knee (Therapeutic Exercise) strengthening exercise  -JR     Knee Strengthening (Therapeutic Exercise) bilateral;LAQ (long arc quad);marching while seated;20 repititions  -JR     Row Name 04/11/22 1543          Ankle (Therapeutic Exercise)    Ankle (Therapeutic Exercise) strengthening exercise  -JR     Ankle Strengthening (Therapeutic Exercise) bilateral;dorsiflexion;plantarflexion;20 repititions  -     Row Name 04/11/22 1543          Balance    Balance Assessment sitting static balance;sitting dynamic balance;sit to stand dynamic balance;standing static balance;standing dynamic balance  -JR     Static Sitting Balance supervision  -JR     Dynamic Sitting Balance supervision  -JR     Position, Sitting Balance sitting edge of bed  -JR     Sit to Stand Dynamic Balance contact guard  -JR     Static Standing Balance contact guard  -JR     Dynamic Standing Balance contact guard  -JR     Position/Device Used, Standing Balance walker, rolling  -JR           User Key  (r) = Recorded By, (t) = Taken By, (c) = Cosigned By    Initials Name Provider Type    JR Maribel Dorman PT Physical Therapist               Goals/Plan    No documentation.                Clinical Impression     Row Name 04/11/22 1543          Pain    Pretreatment Pain Rating 0/10 - no pain  -JR     Posttreatment Pain Rating 0/10 - no pain  -JR     [facility] Name 04/11/22 1543          Plan of Care Review    Plan of Care Reviewed With patient  -     Progress improving  -     Outcome Evaluation Patient demonstrates improving strength and balance as seen by increased mobility with decreased assistance needed.  She requires CGA for bed mobility, transfers and gait x 12 feet with RW.  She is able to perform seated therapeutic exercises x 20 repetitions with instructions for full AROM and decreased speed to improve muscle isolation.   She is in good spirits and pleased to be sitting in the chair at the end of the PT session.  She is expected to benefit from additional PT services per POC.  -     Row Name 04/11/22 1543          Positioning and Restraints    Pre-Treatment Position in bed  -JR     Post Treatment Position chair  -JR     In Chair sitting;call light within reach;encouraged to call for assist;notified nsg  -           User Key  (r) = Recorded By, (t) = Taken By, (c) = Cosigned By    Initials Name Provider Type    Maribel Miguel PT Physical Therapist               Outcome Measures     Row Name 04/11/22 1543          How much help from another person do you currently need...    Turning from your back to your side while in flat bed without using bedrails? 3  -JR     Moving from lying on back to sitting on the side of a flat bed without bedrails? 3  -JR     Moving to and from a bed to a chair (including a wheelchair)? 3  -JR     Standing up from a chair using your arms (e.g., wheelchair, bedside chair)? 3  -JR     Climbing 3-5 steps with a railing? 2  -JR     To walk in hospital room? 3  -JR     AM-PAC 6 Clicks Score (PT) 17  -     Row Name 04/11/22 1543          Functional Assessment    Outcome Measure Options AM-PAC 6 Clicks Basic Mobility (PT)  -           User Key  (r) = Recorded By, (t) = Taken By, (c) = Cosigned By    Initials Name Provider Type    Maribel Miguel, PT Physical Therapist                             Physical Therapy Education                 Title: PT OT SLP Therapies (Done)     Topic: Physical Therapy (Done)     Point: Mobility training (Done)     Learning Progress Summary           Patient Acceptance, E, VU by MARIA DEL ROSARIO at 4/10/2022 0919      Show all documentation for this point (2)                 Point: Home exercise program (Done)     Learning Progress Summary           Patient Acceptance, E,TB, VU by  at 4/11/2022 1641    Comment: Importance of moving slowly through full AROM with short hold at end range.       Show all documentation for this point (2)                 Point: Body mechanics (Done)     Learning Progress Summary           Patient Acceptance, E, VU by PK at 4/10/2022 0919      Show all documentation for this point (1)                 Point: Precautions (Done)     Learning Progress Summary           Patient Acceptance, E, VU by PK at 4/10/2022 0919                               User Key     Initials Effective Dates Name Provider Type Discipline     03/23/22 -  Maribel Dorman, PT Physical Therapist PT    MARIA DEL ROSARIO 06/16/21 -  Genet Ko, RN Registered Nurse Nurse              PT Recommendation and Plan     Plan of Care Reviewed With: patient  Progress: improving  Outcome Evaluation: Patient demonstrates improving strength and balance as seen by increased mobility with decreased assistance needed.  She requires CGA for bed mobility, transfers and gait x 12 feet with RW.  She is able to perform seated therapeutic exercises x 20 repetitions with instructions for full AROM and decreased speed to improve muscle isolation.  She is in good spirits and pleased to be sitting in the chair at the end of the PT session.  She is expected to benefit from additional PT services per POC.     Time Calculation:    PT Charges     Row Name 04/11/22 1643             Time Calculation    Start Time 1543  -JR      Stop Time 1625  -JR      Time Calculation (min) 42 min  -JR      PT Received On 04/11/22  -      PT Goal Re-Cert Due Date 04/13/22  -              Time Calculation- PT    Total Timed Code Minutes- PT 42 minute(s)  -JR              Timed Charges    16157 - PT Therapeutic Exercise Minutes 12  -JR      93242 - Gait Training Minutes  15  -JR      28902 - PT Therapeutic Activity Minutes 15  -JR              Total Minutes    Timed Charges Total Minutes 42  -JR       Total Minutes 42  -JR            User Key  (r) = Recorded By, (t) = Taken By, (c) = Cosigned By    Initials Name Provider Type    JR Maribel Dorman, PT Physical Therapist               Therapy Charges for Today     Code Description Service Date Service Provider Modifiers Qty    97305731016  PT THER PROC EA 15 MIN 4/11/2022 Maribel Dorman, PT GP 1    33025888541 HC GAIT TRAINING EA 15 MIN 4/11/2022 Maribel Dorman, PT GP 1    [id number]  PT THERAPEUTIC ACT EA 15 MIN 4/11/2022 Maribel Dorman, PT GP 1          PT G-Codes  Outcome Measure Options: AM-PAC 6 Clicks Basic Mobility (PT)  AM-PAC 6 Clicks Score (PT): 17  AM-PAC 6 Clicks Score (OT): 15    Maribel Dorman, PT  4/11/2022

## 2022-04-12 LAB
ALBUMIN SERPL-MCNC: 3.5 G/DL (ref 3.5–5.2)
ANION GAP SERPL CALCULATED.3IONS-SCNC: 10.5 MMOL/L (ref 5–15)
BUN SERPL-MCNC: 34 MG/DL (ref 8–23)
BUN/CREAT SERPL: 11.2 (ref 7–25)
CALCIUM SPEC-SCNC: 8.9 MG/DL (ref 8.6–10.5)
CHLORIDE SERPL-SCNC: 100 MMOL/L (ref 98–107)
CO2 SERPL-SCNC: 24.5 MMOL/L (ref 22–29)
CREAT SERPL-MCNC: 3.03 MG/DL (ref 0.57–1)
EGFRCR SERPLBLD CKD-EPI 2021: 16.8 ML/MIN/1.73
GLUCOSE BLDC GLUCOMTR-MCNC: 163 MG/DL (ref 70–130)
GLUCOSE BLDC GLUCOMTR-MCNC: 199 MG/DL (ref 70–130)
GLUCOSE BLDC GLUCOMTR-MCNC: 82 MG/DL (ref 70–130)
GLUCOSE BLDC GLUCOMTR-MCNC: 92 MG/DL (ref 70–130)
GLUCOSE SERPL-MCNC: 201 MG/DL (ref 65–99)
PHOSPHATE SERPL-MCNC: 3.8 MG/DL (ref 2.5–4.5)
POTASSIUM SERPL-SCNC: 4.1 MMOL/L (ref 3.5–5.2)
SODIUM SERPL-SCNC: 135 MMOL/L (ref 136–145)

## 2022-04-12 PROCEDURE — 63710000001 INSULIN ASPART PER 5 UNITS: Performed by: FAMILY MEDICINE

## 2022-04-12 PROCEDURE — 63710000001 INSULIN DETEMIR PER 5 UNITS: Performed by: FAMILY MEDICINE

## 2022-04-12 PROCEDURE — 80069 RENAL FUNCTION PANEL: CPT | Performed by: INTERNAL MEDICINE

## 2022-04-12 PROCEDURE — 82962 GLUCOSE BLOOD TEST: CPT

## 2022-04-12 PROCEDURE — 99232 SBSQ HOSP IP/OBS MODERATE 35: CPT | Performed by: INTERNAL MEDICINE

## 2022-04-12 RX ORDER — QUETIAPINE FUMARATE 25 MG/1
12.5 TABLET, FILM COATED ORAL NIGHTLY
Status: DISCONTINUED | OUTPATIENT
Start: 2022-04-12 | End: 2022-04-14 | Stop reason: HOSPADM

## 2022-04-12 RX ADMIN — ESCITALOPRAM OXALATE 10 MG: 10 TABLET ORAL at 08:18

## 2022-04-12 RX ADMIN — INSULIN ASPART 3 UNITS: 100 INJECTION, SOLUTION INTRAVENOUS; SUBCUTANEOUS at 06:38

## 2022-04-12 RX ADMIN — FERROUS SULFATE TAB EC 324 MG (65 MG FE EQUIVALENT) 324 MG: 324 (65 FE) TABLET DELAYED RESPONSE at 08:18

## 2022-04-12 RX ADMIN — Medication 3 ML: at 08:21

## 2022-04-12 RX ADMIN — INSULIN DETEMIR 10 UNITS: 100 INJECTION, SOLUTION SUBCUTANEOUS at 08:18

## 2022-04-12 RX ADMIN — FERROUS SULFATE TAB EC 324 MG (65 MG FE EQUIVALENT) 324 MG: 324 (65 FE) TABLET DELAYED RESPONSE at 18:07

## 2022-04-12 RX ADMIN — LIDOCAINE 1 PATCH: 50 PATCH CUTANEOUS at 21:11

## 2022-04-12 RX ADMIN — Medication 10 ML: at 21:13

## 2022-04-12 RX ADMIN — KETOCONAZOLE 1 APPLICATION: 20 CREAM TOPICAL at 10:00

## 2022-04-12 RX ADMIN — KETOCONAZOLE 1 APPLICATION: 20 CREAM TOPICAL at 21:10

## 2022-04-12 RX ADMIN — ASPIRIN 81 MG: 81 TABLET, CHEWABLE ORAL at 08:17

## 2022-04-12 RX ADMIN — ISOSORBIDE MONONITRATE 30 MG: 30 TABLET, EXTENDED RELEASE ORAL at 08:17

## 2022-04-12 RX ADMIN — Medication 1 CAPSULE: at 08:17

## 2022-04-12 RX ADMIN — Medication 10 ML: at 08:18

## 2022-04-12 RX ADMIN — Medication 1 CAPSULE: at 21:06

## 2022-04-12 RX ADMIN — LEVOTHYROXINE SODIUM 150 MCG: 150 TABLET ORAL at 08:17

## 2022-04-12 RX ADMIN — KETOCONAZOLE 1 APPLICATION: 20 CREAM TOPICAL at 00:34

## 2022-04-12 RX ADMIN — METOPROLOL SUCCINATE 125 MG: 25 TABLET, EXTENDED RELEASE ORAL at 08:17

## 2022-04-12 RX ADMIN — Medication 10 ML: at 08:21

## 2022-04-12 RX ADMIN — SILVER SULFADIAZINE 1 APPLICATION: 10 CREAM TOPICAL at 18:48

## 2022-04-12 RX ADMIN — DOCUSATE SODIUM 100 MG: 100 CAPSULE, LIQUID FILLED ORAL at 08:17

## 2022-04-12 RX ADMIN — APIXABAN 2.5 MG: 2.5 TABLET, FILM COATED ORAL at 08:17

## 2022-04-12 RX ADMIN — SILVER SULFADIAZINE 1 APPLICATION: 10 CREAM TOPICAL at 00:32

## 2022-04-12 RX ADMIN — APIXABAN 2.5 MG: 2.5 TABLET, FILM COATED ORAL at 21:07

## 2022-04-12 RX ADMIN — DOCUSATE SODIUM 100 MG: 100 CAPSULE, LIQUID FILLED ORAL at 21:06

## 2022-04-12 RX ADMIN — Medication 3 ML: at 21:13

## 2022-04-12 RX ADMIN — Medication 1 TABLET: at 08:17

## 2022-04-12 RX ADMIN — INSULIN ASPART 3 UNITS: 100 INJECTION, SOLUTION INTRAVENOUS; SUBCUTANEOUS at 12:16

## 2022-04-12 RX ADMIN — SILVER SULFADIAZINE 1 APPLICATION: 10 CREAM TOPICAL at 21:09

## 2022-04-12 RX ADMIN — TORSEMIDE 100 MG: 100 TABLET ORAL at 08:17

## 2022-04-12 RX ADMIN — QUETIAPINE FUMARATE 12.5 MG: 25 TABLET ORAL at 21:07

## 2022-04-12 RX ADMIN — PANTOPRAZOLE SODIUM 40 MG: 40 TABLET, DELAYED RELEASE ORAL at 08:17

## 2022-04-12 NOTE — CODE DOCUMENTATION
Continued Stay Note  SRINI Hdz     Patient Name: Millicent Mckeon  MRN: [id number]  Today's Date: 4/12/2022    Admit Date: 3/30/2022     Discharge Plan     Row Name 04/12/22 1203       Plan    Plan spoke to pt she is in agreement to working with  Pt Spoke to pt Niece she reports that as long as pt can transfer to  BSC that she will have 24 hours help at home  Per OT notes pt is able to transfer to bs and  .Neazalia is in agreement for discharge to home pt is declining rehab ,Nikavita is going to see about getting her WC to the hospital so that federated not BMT will need to [place] [employer] can be used                Discharge Codes    No documentation.               Expected Discharge Date and Time     Expected Discharge Date Expected Discharge Time    Apr 13, 2022             Azalia Mcfadden RN

## 2022-04-12 NOTE — PLAN OF CARE
Goal Outcome Evaluation:           Progress: improving  Outcome Evaluation: VSS.  Patient alert and oriented x4.  Oxygenation maintained on room air.  Contact isolation maintained per protocol.  Continued cardiac monitoring and pulse oximetry as ordered.  Accu-checks as ordered.  No acute events noted during shift.  Will continue to monitor patient.

## 2022-04-12 NOTE — THERAPY TREATMENT NOTE
Patient has refused participation with OT x 2 this date. At 1121 patient was asleep, woke up and refused participation and went back to sleep. Breakfast tray had not been touched. At 1355 patient still asleep with blanket covering head and refused again stating she hasn't had any sleep. Patient had not touched lunch tray. Patient walked 12' with PT yesterday and is capable of transferring to [facility] and  when agreeable.

## 2022-04-12 NOTE — PROGRESS NOTES
[facility]    Name:  Millicent Mckeon   Age:  61 y.o.  Sex:  female  :  1960  MRN:  [id number]   Visit Number:  [id number]  Admission Date:  3/30/2022  Date Of Service:  22  Primary Care Physician:  Marianela Albright APRN     LOS: 11 days :    Chief Complaint:      F/u b/l cellulitis    Subjective:    No acute events overnight. She hasn't had much of an appetite. No n/v. No pain.     Hospital Course:      The patient is a 61-year-old chronically ill-appearing female with history of atrial fibrillation, chronic systolic congestive heart failure, CKD stage IV, chronic cor pulmonale, morbid obesity, functional quadriplegia who had presented to the emergency room with generalized weakness.  She had apparently been found at home disheveled and with multiple skin sores and wounds.  She had had multiple treatments recently for cellulitis of the lower extremity and hypoglycemia.       Initial work-up was demonstrating bradycardia, hypoglycemia, with creatinine 3.74 and a BUN of 75.  Hemoglobin 10.8.  Chest x-ray show cardiomegaly and bilateral opacity in the lower zones.  Concern for pneumonia and she was given azithromycin and Rocephin.  She did develop some low blood pressures and became hypothermic upon admission.  She was started on midodrine and IV Lasix and had Wilkinson catheter placed.  There was concern for sepsis due to her recent hospitalization she was started on Zosyn and vancomycin.  She had a left internal jugular central venous line placed on 3/31/2022.  She subsequent had improvement in her blood pressures.  Her MRSA swab was negative, vancomycin was discontinued.  She also had some improvement in her urine output.  She was noted to have overall worsening kidney function, discussions had about dialysis.  Patient was seen by palliative care services.  She did elect to be a DNR/DNI.  After discussion with patient's niece, patient is now in agreement with  trial of dialysis and will plan on having a tunneled dialysis catheter placed.  This was placed on 4/5/2022 and she started hemodialysis.  Case management was consulted and outpatient HD has been set up. Discharge was pending placement to rehab.     Review of Systems:     All systems were reviewed and negative except as mentioned in subjective, assessment and plan.    Vital Signs:    Temp:  [97.4 °F (36.3 °C)-97.7 °F (36.5 °C)] 97.6 °F (36.4 °C)  Heart Rate:  [69-97] 79  Resp:  [14-18] 16  BP: (111-135)/(48-81) 111/48    Intake and output:    I/O last 3 completed shifts:  In: 240 [P.O.:240]  Out: 4000 [Other:4000]  No intake/output data recorded.    Physical Examination:    General Appearance:  Alert and cooperative. NAD.    Head:  Atraumatic and normocephalic.   Eyes: Conjunctivae and sclerae normal, no icterus. No pallor.   Throat: No oral lesions, no thrush, oral mucosa moist.   Neck: Supple, trachea midline, no thyromegaly.   Lungs:   Breath sounds heard bilaterally equally.  No wheezing or crackles. No Pleural rub or bronchial breathing.   Heart:  Normal S1 and S2, no murmur, no gallop, no rub. No JVD.   Abdomen:   Normal bowel sounds, no masses, no organomegaly. Soft, nontender, nondistended, no rebound tenderness.   Extremities: Dressings b/l LE c/d/i.    Skin: No bleeding or rash.   Neurologic: Alert and oriented. No facial asymmetry. Moves all four limbs. No tremors.      Laboratory results:    Results from last 7 days   Lab Units 04/12/22  0526 04/11/22  0626 04/10/22  0541   SODIUM mmol/L 135* 137 140   POTASSIUM mmol/L 4.1 4.3 4.3   CHLORIDE mmol/L 100 102 103   CO2 mmol/L 24.5 20.9* 22.4   BUN mg/dL 34* 49* 45*   CREATININE mg/dL 3.03* 3.45* 3.32*   CALCIUM mg/dL 8.9 8.9 8.8   GLUCOSE mg/dL 201* 114* 139*     Results from last 7 days   Lab Units 04/10/22  0541 04/09/22  0541 04/08/22  0530   WBC 10*3/mm3 6.44 6.34 6.05   HEMOGLOBIN g/dL 9.9* 9.8* 9.9*   HEMATOCRIT % 33.2* 32.4* 32.7*   PLATELETS  10*3/mm3 100* 72* 64*                     I have reviewed the patient's laboratory results.    Radiology results:    No radiology results from the last 24 hrs  I have reviewed the patient's radiology reports.    Medication Review:     I have reviewed the patient's active and prn medications.     Problem List:      Hypoglycemia    Bilateral edema of lower extremity    Essential hypertension    Acquired hypothyroidism    Type 2 diabetes mellitus with nephropathy (HCC)    Cellulitis of both lower extremities    Anemia due to stage 4 chronic kidney disease ([facility])    Chronic diastolic congestive heart failure (HCC)    Cor pulmonale, chronic (HCC)    Chronic kidney disease, stage IV (severe) ([facility])    Bradycardia      Assessment/Plan:      Progressive worsening of CKD a/w volume overload  -HD initiated on 4/5  -torsemide po 100mg qd  -continue clay     Sepsis from b/l LE cellulitis, resolved  -s/p 5 days of zosyn and 5 days of augmentin  -blood cultures collected on 3/30/22 no growth at 5 days     Symptomatic bradycardia, resolved  -continuing her toprol-xl  -holding her amiodarone     DVT Prophylaxis: Eliquis  Code Status: DNR/DNI  Diet: Renal  Discharge Plan: Discharge has been pending rehab placement       Doc Keith MD  04/12/22  11:30 EDT

## 2022-04-12 NOTE — CASE MANAGEMENT/SOCIAL WORK
Continued Stay Note  [facility]     Patient Name: Millicent Mckeon  MRN: [id number]  Today's Date: 4/12/2022    Admit Date: 3/30/2022     Discharge Plan     Row Name 04/12/22 1213       Plan    Plan Comments RAMON called [employer] to arrange transportation to dialysis at [facility]. RAMON spoke with Jocelyn. RAMON informed them that pt could not use BMT. Jocelyn states that transportation would be provided via [employer]. RAMON informed Jocelyn that pt is in a wheelchair. Jocelyn states that transportation would not be able to begin until Friday, 4/15/22. Pt's home address and [facility]'s address was given. Arrival time is 7:30-7:45am Monday, Wednesday, and Friday. The report number is [id number].    Row Name 04/12/22 1203       Plan    Plan spoke to pt she is in agreement to working with  Pt Spoke to pt [place] she repotst that as long as pt can transfer to [facility] that she will have 24 hours help at home               Discharge Codes    No documentation.               Expected Discharge Date and Time     Expected Discharge Date Expected Discharge Time    Apr 13, 2022             HOLLY Waite

## 2022-04-12 NOTE — PROGRESS NOTES
[facility] Progress Note  [facility]. [place]        Patient Name: Millicent Mckeon  : 1960  MRN: [id number]   LOS: 11 days    Patient Care Team:  Marianela Albright APRN as PCP - General (Family Medicine)  Geremias Shepherd MD as Consulting Physician (General Surgery)  Lisa Cardoso MD as Surgeon (General Surgery)    Chief Complaint:    Chief Complaint   Patient presents with   • Weakness - Generalized     Unable to stand     Primary Care Physician:  Marianela Albright APRN  Date of admission: 3/30/2022    Subjective     Interval History:   Events noted from last 24 hours.  She drowsy and sleepy this morning.  She has minimal interaction.  Denies any chest pain or shortness of breath.  Denies any fever or chills.  Rehab placement is in progress.  Review of Systems:   As noted above    Objective     Vitals:   Temp:  [97.4 °F (36.3 °C)-97.7 °F (36.5 °C)] 97.5 °F (36.4 °C)  Heart Rate:  [69-97] 73  Resp:  [14-18] 16  BP: (114-135)/(57-81) 132/80    Intake/Output Summary (Last 24 hours) at 2022 0748  Last data filed at 2022 1500  Gross per 24 hour   Intake 240 ml   Output 4000 ml   Net -3760 ml       Physical Exam:    General Appearance: alert,  no acute distress   Skin: warm and dry  HEENT: oral mucosa normal, nonicteric sclera  Neck: supple, no JVD  Lungs: Still have decreased breath sounds at both bases.  Heart: RRR, normal S1 and S2  Abdomen: Obese, soft to firm, nontender, nondistended, she does have dependent edema mostly on the back of her body including sacral area.  : no palpable bladder  Extremities: 1 + edema, mostly it is dependent edema, no cyanosis or clubbing  Neuro: Awake and interactive, randomly move extremities.    Scheduled Meds:     Current Facility-Administered Medications   Medication Dose Route Frequency Provider Last Rate Last Admin   • acetaminophen (TYLENOL) tablet 650 mg  650 mg Oral Q4H PRN Cheyanne Valencia DO   650 mg at 22  0629    Or   • acetaminophen (TYLENOL) 160 MG/5ML solution 650 mg  650 mg Oral Q4H PRN Cheyanne Valencia DO        Or   • acetaminophen (TYLENOL) suppository 650 mg  650 mg Rectal Q4H PRN Cheyanne Valencia DO       • albumin human 25 % IV SOLN 12.5 g  12.5 g Intravenous PRN Milad Leone MD       • apixaban (ELIQUIS) tablet 2.5 mg  2.5 mg Oral Q12H Doc Keith MD   2.5 mg at 04/11/22 2135   • aspirin chewable tablet 81 mg  81 mg Oral Daily Cheyanne Valencia DO   81 mg at 04/11/22 1347   • b complex-vitamin c-folic acid (NEPHRO-LOIS) tablet 1 tablet  1 tablet Oral Daily Cheyanne Valencia DO   1 tablet at 04/11/22 1352   • dextrose (D50W) (25 g/50 mL) IV injection 25 g  25 g Intravenous Q15 Min PRN Cheyanne Valencia DO       • dextrose (D50W) (25 g/50 mL) IV injection 50 mL  50 mL Intravenous Q1H PRN Cheyanne Valencia DO   50 mL at 03/31/22 0630   • dextrose (GLUTOSE) oral gel 1 tube  1 tube Oral Q15 Min PRN Cheyanne Valencia DO       • docusate sodium (COLACE) capsule 100 mg  100 mg Oral BID Cheyanne Valencia DO   100 mg at 04/11/22 2135   • escitalopram (LEXAPRO) tablet 10 mg  10 mg Oral Daily Milad Leone MD   10 mg at 04/11/22 1350   • ferrous sulfate EC tablet 324 mg  324 mg Oral BID With Meals Cheyanne Valencia DO   324 mg at 04/11/22 1751   • glucagon (human recombinant) (GLUCAGEN DIAGNOSTIC) injection 1 mg  1 mg Subcutaneous PRN Cheyanne Valencia DO       • heparin (porcine) injection 1,000 Units  1,000 Units Intracatheter PRN Cheyanne Valencia DO       • heparin (porcine) injection 1,000 Units  1,000 Units Intracatheter PRN Milad Leone MD       • heparin (porcine) injection 1,000 Units  1,000 Units Intracatheter Milad Bianchi MD       • heparin (porcine) injection 1,000 Units  1,000 Units Intracatheter PRMilad Kinsey MD   1,000 Units at 04/11/22 1240   • insulin aspart (novoLOG) injection 0-14 Units  0-14 Units Subcutaneous  TID AC Cheyanne Valencia, DO   3 Units at 04/12/22 0638   • insulin detemir (LEVEMIR) injection 10 Units  10 Units Subcutaneous Q12H Cheyanne Valencia DO   10 Units at 04/11/22 2138   • ipratropium-albuterol (DUO-NEB) nebulizer solution 3 mL  3 mL Nebulization Q4H PRN Cheyanne Valencia DO       • isosorbide mononitrate (IMDUR) 24 hr tablet 30 mg  30 mg Oral Daily Cheyanne Valencia, DO   30 mg at 04/11/22 1346   • ketoconazole (NIZORAL) 2 % cream 1 application  1 application Topical BID Cheyanne Valencia DO   1 application at 04/12/22 0034   • lactobacillus acidophilus (RISAQUAD) capsule 1 capsule  1 capsule Oral BID Cheyanne Valencia DO   1 capsule at 04/11/22 2135   • levothyroxine (SYNTHROID, LEVOTHROID) tablet 150 mcg  150 mcg Oral Daily Cheyanne Valencia DO   150 mcg at 04/11/22 1347   • lidocaine (LIDODERM) 5 % 1 patch  1 patch Transdermal Q24H Cheyanne Valencia DO   1 patch at 04/10/22 2054   • metoprolol succinate XL (TOPROL-XL) 24 hr tablet 125 mg  125 mg Oral Q24H Milad Leone MD   125 mg at 04/11/22 1346   • ondansetron (ZOFRAN) injection 4 mg  4 mg Intravenous Q6H PRN Cheyanne Valencia DO       • pantoprazole (PROTONIX) EC tablet 40 mg  40 mg Oral Daily Cheyanne Valencia DO   40 mg at 04/11/22 1350   • QUEtiapine (SEROquel) tablet 25 mg  25 mg Oral Nightly Milad Leone MD   25 mg at 04/11/22 2135   • silver sulfadiazine (SILVADENE, SSD) 1 % cream 1 application  1 application Topical Q12H Cheyanne Valencia DO   1 application at 04/12/22 0032   • sodium chloride 0.9 % bolus 1,000 mL  1,000 mL Intravenous PRN Milad Leone MD       • sodium chloride 0.9 % flush 10 mL  10 mL Intravenous PRN Cheyanne Valencia, DO       • sodium chloride 0.9 % flush 10 mL  10 mL Intravenous Q12H Cheyanne Valencia, DO   10 mL at 04/11/22 2136   • sodium chloride 0.9 % flush 10 mL  10 mL Intravenous Q12H Cheyanne Valencia, DO   10 mL at  04/11/22 2136   • sodium chloride 0.9 % flush 10 mL  10 mL Intravenous Q12H Karrick, Cheyanne Syed, DO   10 mL at 04/11/22 2136   • sodium chloride 0.9 % flush 10 mL  10 mL Intravenous PRN Karrick, Cheyanne Syed, DO       • sodium chloride 0.9 % flush 20 mL  20 mL Intravenous PRN Karrick, Cheyanne Syed, DO       • sodium chloride 0.9 % flush 3 mL  3 mL Intravenous Q12H Karrick, Cheyanne Syed, DO   3 mL at 04/11/22 2135   • sodium chloride 0.9 % flush 3-10 mL  3-10 mL Intravenous PRN Karrick, Cheyanne Syed, DO       • torsemide (DEMADEX) tablet 100 mg  100 mg Oral Daily Milad Leone MD   100 mg at 04/11/22 1346       apixaban, 2.5 mg, Oral, Q12H  aspirin, 81 mg, Oral, Daily  b complex-vitamin c-folic acid, 1 tablet, Oral, Daily  docusate sodium, 100 mg, Oral, BID  escitalopram, 10 mg, Oral, Daily  ferrous sulfate, 324 mg, Oral, BID With Meals  insulin aspart, 0-14 Units, Subcutaneous, TID AC  insulin detemir, 10 Units, Subcutaneous, Q12H  isosorbide mononitrate, 30 mg, Oral, Daily  ketoconazole, 1 application, Topical, BID  lactobacillus acidophilus, 1 capsule, Oral, BID  levothyroxine, 150 mcg, Oral, Daily  lidocaine, 1 patch, Transdermal, Q24H  metoprolol succinate XL, 125 mg, Oral, Q24H  pantoprazole, 40 mg, Oral, Daily  QUEtiapine, 25 mg, Oral, Nightly  silver sulfadiazine, 1 application, Topical, Q12H  sodium chloride, 10 mL, Intravenous, Q12H  sodium chloride, 10 mL, Intravenous, Q12H  sodium chloride, 10 mL, Intravenous, Q12H  sodium chloride, 3 mL, Intravenous, Q12H  torsemide, 100 mg, Oral, Daily        IV Meds:        Results Reviewed:   I have personally reviewed the results from the time of this admission to 4/12/2022 07:48 EDT     Results from last 7 days   Lab Units 04/12/22  0526 04/11/22  0626 04/10/22  0541   SODIUM mmol/L 135* 137 140   POTASSIUM mmol/L 4.1 4.3 4.3   CHLORIDE mmol/L 100 102 103   CO2 mmol/L 24.5 20.9* 22.4   BUN mg/dL 34* 49* 45*   CREATININE mg/dL 3.03* 3.45* 3.32*   CALCIUM  mg/dL 8.9 8.9 8.8   GLUCOSE mg/dL 201* 114* 139*       Estimated Creatinine Clearance: 26.3 mL/min (A) (by C-G formula based on SCr of 3.03 mg/dL (H)).    Results from last 7 days   Lab Units 04/12/22  0526 04/11/22  0626 04/10/22  0541   PHOSPHORUS mg/dL 3.8 4.6* 4.3             Results from last 7 days   Lab Units 04/10/22  0541 04/09/22  0541 04/08/22  0530 04/07/22  0540 04/06/22  0751   WBC 10*3/mm3 6.44 6.34 6.05 4.82 4.88   HEMOGLOBIN g/dL 9.9* 9.8* 9.9* 10.4* 9.8*   PLATELETS 10*3/mm3 100* 72* 64* 60* 55*             Brief Urine Lab Results  (Last result in the past 365 days)      Color   Clarity   Blood   Leuk Est   Nitrite   Protein   CREAT   Urine HCG        03/30/22 1705 Yellow   Clear   Trace   Negative   Negative   >=300 mg/dL (3+)                 No results found for: UTPCR    Imaging Results (Last 24 Hours)     ** No results found for the last 24 hours. **              Assessment / Plan     ASSESSMENT:    End-stage renal disease: After denying to have dialysis finally she has agreed, because of multiple issues with the belly peritoneal dialysis catheter was not an option at this point, she ended up with a tunneled dialysis catheter.  We will make arrangements for her outpatient dialysis, once she is better we will have further discussion with the family if they still are interested and wants to do her home peritoneal dialysis.    Anemia: Hemoglobin appears to be fairly stable, she does have iron deficiency and because of acute infection cannot give any IV iron continue with oral iron and will start BI as an outpatient.    Chronic diastolic congestive heart failure (HCC): We will try to diurese with high-dose diuretics and see if she will respond to it.  Volume status significantly better since on dialysis will take another 2 to 3 L until she gets to euvolemia.    Cor pulmonale, chronic (HCC): Longstanding history of untreated sleep apnea might be contributing.    Hypoglycemia: Oral hypoglycemic  agents with prolonged effect from renal failure likely the cause.    Bilateral edema of lower extremity: She does not have much lower extremity edema most of the edema is in the abdominal and chest area appears to be having generalized anasarca.    Essential hypertension: Blood pressure is on the low side we will hold off all blood pressure medications we will go ahead and start her on midodrine to maintain blood pressure while aggressively diuresing her.  I will go ahead and stop the hydralazine today and optimize her volume status and continue with the metoprolol.    Acquired hypothyroidism: Continue home Synthroid dose.    Type 2 diabetes mellitus with nephropathy (HCC): As per hospitalist service.    Cellulitis of both lower extremities: IV antibiotics have been started.    Bradycardia: It has resolved continue to increase beta-blocker for blood pressure control.      PLAN:  I called the dialysis clinic and gave her orders for her outpatient dialysis.  Her days been switched to Monday Wednesday Friday at 7 AM.  She will need Bunny lift and a wheelchair transportation as well as wheelchair to get to and from dialysis.  It is still not fairly clear who is going to help her at home.  Notes reviewed and it does appear brother is willing to help if she is able to walk around without help.  Now she is being evaluated for rehab placement.  Next dialysis due tomorrow will make arrangements.  Details were discussed with the patient.     Details were also discussed with the hospitalist service as well as nursing staff.  She needs to go home with the current medications.  Please do not change any medication that she is on currently at the time of discharge.  Continue with rest of the current treatment plan, and monitor with surveillance labs.  Further recommendations will depend on clinical course of the patient during the current hospitalization.     Thank you for involving us in the care of Millicent Mckeon.  Please feel  free to call with any questions.    Milad Leone MD  04/12/22  07:48 EDT       [facility]  [phone]      Much of this encounter note is an electronic transcription/translation of spoken language to printed text. The electronic translation of spoken language may permit erroneous, or at times, nonsensical words or phrases to be inadvertently transcribed; Although I have reviewed the note for such errors, some may still exist.

## 2022-04-12 NOTE — THERAPY TREATMENT NOTE
Pt declined treatment reporting not sleeping well the previous pm and was too tired today. Pt reports may be able to work with therapy tomorrow.

## 2022-04-13 LAB
ALBUMIN SERPL-MCNC: 3.2 G/DL (ref 3.5–5.2)
ANION GAP SERPL CALCULATED.3IONS-SCNC: 13.8 MMOL/L (ref 5–15)
BUN SERPL-MCNC: 41 MG/DL (ref 8–23)
BUN/CREAT SERPL: 11.6 (ref 7–25)
CALCIUM SPEC-SCNC: 8.9 MG/DL (ref 8.6–10.5)
CHLORIDE SERPL-SCNC: 101 MMOL/L (ref 98–107)
CO2 SERPL-SCNC: 22.2 MMOL/L (ref 22–29)
CREAT SERPL-MCNC: 3.53 MG/DL (ref 0.57–1)
EGFRCR SERPLBLD CKD-EPI 2021: 14 ML/MIN/1.73
GLUCOSE BLDC GLUCOMTR-MCNC: 110 MG/DL (ref 70–130)
GLUCOSE BLDC GLUCOMTR-MCNC: 136 MG/DL (ref 70–130)
GLUCOSE BLDC GLUCOMTR-MCNC: 139 MG/DL (ref 70–130)
GLUCOSE BLDC GLUCOMTR-MCNC: 194 MG/DL (ref 70–130)
GLUCOSE SERPL-MCNC: 107 MG/DL (ref 65–99)
PHOSPHATE SERPL-MCNC: 4.3 MG/DL (ref 2.5–4.5)
POTASSIUM SERPL-SCNC: 4.2 MMOL/L (ref 3.5–5.2)
SODIUM SERPL-SCNC: 137 MMOL/L (ref 136–145)

## 2022-04-13 PROCEDURE — 63710000001 INSULIN DETEMIR PER 5 UNITS: Performed by: FAMILY MEDICINE

## 2022-04-13 PROCEDURE — 99231 SBSQ HOSP IP/OBS SF/LOW 25: CPT | Performed by: INTERNAL MEDICINE

## 2022-04-13 PROCEDURE — 25010000002 HEPARIN (PORCINE) PER 1000 UNITS: Performed by: INTERNAL MEDICINE

## 2022-04-13 PROCEDURE — 82962 GLUCOSE BLOOD TEST: CPT

## 2022-04-13 PROCEDURE — 80069 RENAL FUNCTION PANEL: CPT | Performed by: INTERNAL MEDICINE

## 2022-04-13 PROCEDURE — 5A1D70Z PERFORMANCE OF URINARY FILTRATION, INTERMITTENT, LESS THAN 6 HOURS PER DAY: ICD-10-PCS | Performed by: INTERNAL MEDICINE

## 2022-04-13 RX ORDER — LEVOTHYROXINE SODIUM 0.15 MG/1
150 TABLET ORAL DAILY
Qty: 30 TABLET | Refills: 1 | Status: SHIPPED | OUTPATIENT
Start: 2022-04-14 | End: 2022-05-14

## 2022-04-13 RX ORDER — FOLIC ACID/VIT B COMPLEX AND C 0.8 MG
1 TABLET ORAL DAILY
Qty: 30 TABLET | Refills: 12 | Status: ON HOLD | OUTPATIENT
Start: 2022-04-14 | End: 2022-05-18

## 2022-04-13 RX ORDER — QUETIAPINE FUMARATE 25 MG/1
12.5 TABLET, FILM COATED ORAL NIGHTLY
Qty: 15 TABLET | Refills: 1 | Status: SHIPPED | OUTPATIENT
Start: 2022-04-13 | End: 2022-05-14

## 2022-04-13 RX ORDER — ESCITALOPRAM OXALATE 10 MG/1
10 TABLET ORAL DAILY
Qty: 30 TABLET | Refills: 12 | Status: SHIPPED | OUTPATIENT
Start: 2022-04-14 | End: 2022-05-14

## 2022-04-13 RX ORDER — METOPROLOL SUCCINATE 100 MG/1
100 TABLET, EXTENDED RELEASE ORAL
Qty: 30 TABLET | Refills: 1 | Status: SHIPPED | OUTPATIENT
Start: 2022-04-14 | End: 2022-05-18 | Stop reason: HOSPADM

## 2022-04-13 RX ORDER — TORSEMIDE 100 MG/1
100 TABLET ORAL DAILY
Qty: 30 TABLET | Refills: 1 | Status: SHIPPED | OUTPATIENT
Start: 2022-04-14 | End: 2022-05-18 | Stop reason: HOSPADM

## 2022-04-13 RX ORDER — ALBUMIN (HUMAN) 12.5 G/50ML
12.5 SOLUTION INTRAVENOUS AS NEEDED
Status: DISCONTINUED | OUTPATIENT
Start: 2022-04-13 | End: 2022-04-14 | Stop reason: HOSPADM

## 2022-04-13 RX ORDER — HEPARIN SODIUM 1000 [USP'U]/ML
1000 INJECTION, SOLUTION INTRAVENOUS; SUBCUTANEOUS AS NEEDED
Status: DISCONTINUED | OUTPATIENT
Start: 2022-04-13 | End: 2022-04-14 | Stop reason: HOSPADM

## 2022-04-13 RX ORDER — L.ACID,PARA/B.BIFIDUM/S.THERM 8B CELL
1 CAPSULE ORAL 2 TIMES DAILY
Qty: 60 CAPSULE | Refills: 0 | Status: SHIPPED | OUTPATIENT
Start: 2022-04-13 | End: 2022-05-18 | Stop reason: HOSPADM

## 2022-04-13 RX ADMIN — FERROUS SULFATE TAB EC 324 MG (65 MG FE EQUIVALENT) 324 MG: 324 (65 FE) TABLET DELAYED RESPONSE at 14:33

## 2022-04-13 RX ADMIN — Medication 10 ML: at 20:17

## 2022-04-13 RX ADMIN — Medication 3 ML: at 20:16

## 2022-04-13 RX ADMIN — ESCITALOPRAM OXALATE 10 MG: 10 TABLET ORAL at 15:10

## 2022-04-13 RX ADMIN — METOPROLOL SUCCINATE 125 MG: 25 TABLET, EXTENDED RELEASE ORAL at 15:11

## 2022-04-13 RX ADMIN — TORSEMIDE 100 MG: 100 TABLET ORAL at 15:12

## 2022-04-13 RX ADMIN — KETOCONAZOLE 1 APPLICATION: 20 CREAM TOPICAL at 20:18

## 2022-04-13 RX ADMIN — DOCUSATE SODIUM 100 MG: 100 CAPSULE, LIQUID FILLED ORAL at 20:14

## 2022-04-13 RX ADMIN — INSULIN DETEMIR 10 UNITS: 100 INJECTION, SOLUTION SUBCUTANEOUS at 20:20

## 2022-04-13 RX ADMIN — DOCUSATE SODIUM 100 MG: 100 CAPSULE, LIQUID FILLED ORAL at 14:32

## 2022-04-13 RX ADMIN — ASPIRIN 81 MG: 81 TABLET, CHEWABLE ORAL at 14:31

## 2022-04-13 RX ADMIN — Medication 10 ML: at 20:16

## 2022-04-13 RX ADMIN — SILVER SULFADIAZINE 1 APPLICATION: 10 CREAM TOPICAL at 09:30

## 2022-04-13 RX ADMIN — LEVOTHYROXINE SODIUM 150 MCG: 150 TABLET ORAL at 15:11

## 2022-04-13 RX ADMIN — APIXABAN 2.5 MG: 2.5 TABLET, FILM COATED ORAL at 20:14

## 2022-04-13 RX ADMIN — QUETIAPINE FUMARATE 12.5 MG: 25 TABLET ORAL at 20:14

## 2022-04-13 RX ADMIN — LIDOCAINE 1 PATCH: 50 PATCH CUTANEOUS at 20:13

## 2022-04-13 RX ADMIN — Medication 1 CAPSULE: at 15:10

## 2022-04-13 RX ADMIN — Medication 1 CAPSULE: at 20:20

## 2022-04-13 RX ADMIN — KETOCONAZOLE 1 APPLICATION: 20 CREAM TOPICAL at 14:34

## 2022-04-13 RX ADMIN — Medication 3 ML: at 15:13

## 2022-04-13 RX ADMIN — Medication 1 TABLET: at 15:10

## 2022-04-13 RX ADMIN — APIXABAN 2.5 MG: 2.5 TABLET, FILM COATED ORAL at 14:30

## 2022-04-13 RX ADMIN — ISOSORBIDE MONONITRATE 30 MG: 30 TABLET, EXTENDED RELEASE ORAL at 14:33

## 2022-04-13 RX ADMIN — SILVER SULFADIAZINE 1 APPLICATION: 10 CREAM TOPICAL at 20:17

## 2022-04-13 RX ADMIN — PANTOPRAZOLE SODIUM 40 MG: 40 TABLET, DELAYED RELEASE ORAL at 14:35

## 2022-04-13 RX ADMIN — HEPARIN SODIUM 1000 UNITS: 1000 INJECTION INTRAVENOUS; SUBCUTANEOUS at 12:56

## 2022-04-13 NOTE — PROGRESS NOTES
[facility]    Name:  Millicent Mckeon   Age:  61 y.o.  Sex:  female  :  1960  MRN:  [id number]   Visit Number:  [id number]  Admission Date:  3/30/2022  Date Of Service:  22  Primary Care Physician:  Marianela Albright APRN     LOS: 12 days :      Subjective:    No acute events overnight. NO pain. No dyspnea. No n/v.     Review of Systems:     All systems were reviewed and negative except as mentioned in subjective, assessment and plan.    Vital Signs:    Temp:  [97.5 °F (36.4 °C)-98.7 °F (37.1 °C)] 98 °F (36.7 °C)  Heart Rate:  [] 82  Resp:  [14-20] 18  BP: (118-141)/(59-71) 126/68    Intake and output:    No intake/output data recorded.  I/O this shift:  In: 240 [P.O.:240]  Out: 4000 [Other:4000]    Physical Examination:    General Appearance:  Alert and cooperative. NAD.   Head:  Atraumatic and normocephalic.   Eyes: Conjunctivae and sclerae normal, no icterus. No pallor.   Throat: No oral lesions, no thrush, oral mucosa moist.   Neck: Supple, trachea midline, no thyromegaly.   Lungs:   Breath sounds heard bilaterally equally.  No wheezing or crackles. No Pleural rub or bronchial breathing.   Heart:  Normal S1 and S2, no murmur, no gallop, no rub. No JVD.   Abdomen:   Normal bowel sounds, no masses, no organomegaly. Soft, nontender, nondistended, no rebound tenderness.   Extremities: Supple, no edema, no cyanosis, no clubbing.   Skin: Dressings b/l LE c/d/i.    Neurologic: Alert and oriented. No facial asymmetry. Moves all four limbs. No tremors.      Laboratory results:    Results from last 7 days   Lab Units 22  0543 22  0526 22  0626   SODIUM mmol/L 137 135* 137   POTASSIUM mmol/L 4.2 4.1 4.3   CHLORIDE mmol/L 101 100 102   CO2 mmol/L 22.2 24.5 20.9*   BUN mg/dL 41* 34* 49*   CREATININE mg/dL 3.53* 3.03* 3.45*   CALCIUM mg/dL 8.9 8.9 8.9   GLUCOSE mg/dL 107* 201* 114*     Results from last 7 days   Lab Units 04/10/22  0541  04/09/22  0541 04/08/22  0530   WBC 10*3/mm3 6.44 6.34 6.05   HEMOGLOBIN g/dL 9.9* 9.8* 9.9*   HEMATOCRIT % 33.2* 32.4* 32.7*   PLATELETS 10*3/mm3 100* 72* 64*                     I have reviewed the patient's laboratory results.    Radiology results:    No radiology results from the last 24 hrs  I have reviewed the patient's radiology reports.    Medication Review:     I have reviewed the patient's active and prn medications.     Problem List:      Hypoglycemia    Bilateral edema of lower extremity    Essential hypertension    Acquired hypothyroidism    Type 2 diabetes mellitus with nephropathy (HCC)    Cellulitis of both lower extremities    Anemia due to stage 4 chronic kidney disease ([facility])    Chronic diastolic congestive heart failure ([facility])    Cor pulmonale, chronic (HCC)    Chronic kidney disease, stage IV (severe) ([facility])    Bradycardia      Assessment/Plan:    Progressive worsening of CKD a/w volume overload  -HD initiated on 4/5  -torsemide po 100mg qd  -continue clay     Sepsis from b/l LE cellulitis, resolved  -s/p 5 days of zosyn and 5 days of augmentin  -blood cultures collected on 3/30/22 no growth at 5 days     Symptomatic bradycardia, resolved  -continuing her toprol-xl   -holding her amiodarone     DVT Prophylaxis: Eliquis  Code Status: DNR/DNI  Diet: Renal  Discharge Plan: Patient will be discharging home, pending arrangement of home health services.     Doc Keith MD  04/13/22  16:27 EDT

## 2022-04-13 NOTE — PLAN OF CARE
Goal Outcome Evaluation:         Visited pt earlier today--pt in dialysis.       Later informed planned DC today.  Visited to to find her awake and alert.  When asked pt wasn't sure if she was being discharged or not--she thought she was.   I explained Palliative Care to her as a program for pt's with chronic illnesses such as Dialysis pts.  I explained the services they offer.  When asked she was agreeable to a referral being made.   Informed her I plan to also contact her niece which she agreed was OK.    Contacted pt's niece, Val Cornell, who is listed as POA (does not address HC decisions).  Ms Cornell was in agreement with a Palliative Care referral at [facility].    HCP, Jenni CARDOSO, notified of Palliative Care referral with anticipated DC today. Information needed to access [facility] charting provided.    Secure message sent to Dr Keith to include PC referral in DC summary.    1441  Informed per , Kayley CARDOSO, pt would not be discharging today  Due to not being able to confirm HH acceptance.

## 2022-04-13 NOTE — PLAN OF CARE
Goal Outcome Evaluation:               HD today, 4L fluid removed per Dialysis RN, wound care per MD orders, wctm

## 2022-04-13 NOTE — PLAN OF CARE
Goal Outcome Evaluation:  Plan of Care Reviewed With: patient        Progress: no change  Outcome Evaluation: Patient has slept most of the night. Her vital signs have remained stable and will continue to monitor. no overnight events to report.

## 2022-04-13 NOTE — CASE MANAGEMENT/SOCIAL WORK
Continued Stay Note  [facility]     Patient Name: Millicent Mckeon  MRN: [id number]  Today's Date: 4/13/2022    Admit Date: 3/30/2022     Discharge Plan     Row Name 04/13/22 1052       Plan    Plan pt in dialysis now; called and spoke to Val Cornell/josesito/poa; stated she will need pt to be transported by ems; due to size, knees, etc. unable to get in and out of her car; informed they may have to pay out of pocket for transport if does not meet guidelines; she still wanted to transport by ems; stated uncle will be at pt home, physical address, [address] [place] [address], [place], [place],;  today and be expecting pt; stated does not need beatrice lift now and has a wheelchair at home;aware of [facility] transport scheduled for dialysis, starting Friday and dialysis arrival time 7:30-7:45; M, W, F; stated no other needs at this time; sent message to inform md and rn's  15:26 EDT  hh order noted for dc; called to [facility]; stated will let us know; faxed order  16:28 EDT  Informed md unable to get answer yet on hh approval, if unable to get may be able to set up as outpt services; stated will cancel d/c and wait until tomorrow to determine if can obtain hh; called and informed Val/JOAQUIN and stated had hh before but unsure of name               Discharge Codes    No documentation.               Expected Discharge Date and Time     Expected Discharge Date Expected Discharge Time    Apr 13, 2022             Kayley Mcnair RN

## 2022-04-13 NOTE — PROGRESS NOTES
[facility] Progress Note  [facility]. [place]        Patient Name: Millicent Mckeon  : 1960  MRN: [id number]   LOS: 12 days    Patient Care Team:  Marianela Albright APRN as PCP - General (Family Medicine)  Geremias Shepherd MD as Consulting Physician (General Surgery)  Lisa Cardoso MD as Surgeon (General Surgery)    Chief Complaint:    Chief Complaint   Patient presents with   • Weakness - Generalized     Unable to stand     Primary Care Physician:  Marianela Albright APRN  Date of admission: 3/30/2022    Subjective     Interval History:   Events noted from last 24 hours.  She a lot more awake alert and interactive this morning.  Told her that she needs to really work with the physical therapy if she wants to go home.  She said she did get up and sit in the chair but did not participate in the PT or OT.   Denies any chest pain or shortness of breath.  Denies any fever or chills.  Rehab placement is in progress.  Review of Systems:   As noted above    Objective     Vitals:   Temp:  [97.4 °F (36.3 °C)-98.3 °F (36.8 °C)] 98.3 °F (36.8 °C)  Heart Rate:  [] 102  Resp:  [16-24] 20  BP: (102-141)/(48-71) 119/59    Intake/Output Summary (Last 24 hours) at 2022 0832  Last data filed at 2022 1821  Gross per 24 hour   Intake 0 ml   Output --   Net 0 ml       Physical Exam:    General Appearance: alert,  no acute distress   Skin: warm and dry  HEENT: oral mucosa normal, nonicteric sclera  Neck: supple, no JVD  Lungs: Still have decreased breath sounds at both bases.  Heart: RRR, normal S1 and S2  Abdomen: Obese, soft to firm, nontender, nondistended, she does have dependent edema mostly on the back of her body including sacral area.  : no palpable bladder  Extremities: 1 + edema, mostly it is dependent edema, no cyanosis or clubbing  Neuro: Awake and interactive, randomly move extremities.    Scheduled Meds:     Current Facility-Administered Medications   Medication  Dose Route Frequency Provider Last Rate Last Admin   • acetaminophen (TYLENOL) tablet 650 mg  650 mg Oral Q4H PRN Cheyanne Valencia DO   650 mg at 03/31/22 0629    Or   • acetaminophen (TYLENOL) 160 MG/5ML solution 650 mg  650 mg Oral Q4H PRN Cheyanne Valencia DO        Or   • acetaminophen (TYLENOL) suppository 650 mg  650 mg Rectal Q4H PRN Cheyanne Valencia DO       • albumin human 25 % IV SOLN 12.5 g  12.5 g Intravenous PRN Milad Leone MD       • apixaban (ELIQUIS) tablet 2.5 mg  2.5 mg Oral Q12H Doc Keith MD   2.5 mg at 04/12/22 2107   • aspirin chewable tablet 81 mg  81 mg Oral Daily Cheyanne Valencia DO   81 mg at 04/12/22 0817   • b complex-vitamin c-folic acid (NEPHRO-LOIS) tablet 1 tablet  1 tablet Oral Daily Cheyanne Valencia DO   1 tablet at 04/12/22 0817   • dextrose (D50W) (25 g/50 mL) IV injection 25 g  25 g Intravenous Q15 Min PRN Cheyanne Valencia DO       • dextrose (D50W) (25 g/50 mL) IV injection 50 mL  50 mL Intravenous Q1H PRN Cheyanne Valencia DO   50 mL at 03/31/22 0630   • dextrose (GLUTOSE) oral gel 1 tube  1 tube Oral Q15 Min PRN Cheyanne Valencia DO       • docusate sodium (COLACE) capsule 100 mg  100 mg Oral BID Cheyanne Valencia DO   100 mg at 04/12/22 2106   • escitalopram (LEXAPRO) tablet 10 mg  10 mg Oral Daily Milad Leone MD   10 mg at 04/12/22 0818   • ferrous sulfate EC tablet 324 mg  324 mg Oral BID With Meals Cheyanne Valencia DO   324 mg at 04/12/22 1807   • glucagon (human recombinant) (GLUCAGEN DIAGNOSTIC) injection 1 mg  1 mg Subcutaneous PRN Cheyanne Valencia DO       • heparin (porcine) injection 1,000 Units  1,000 Units Intracatheter PRN Cheyanne Valencia DO       • heparin (porcine) injection 1,000 Units  1,000 Units Intracatheter Milad Bianchi MD       • heparin (porcine) injection 1,000 Units  1,000 Units Intracatheter Milad Bianchi MD       • heparin (porcine) injection 1,000  Units  1,000 Units Intracatheter PRN Milad Leone MD   1,000 Units at 04/11/22 1240   • heparin (porcine) injection 1,000 Units  1,000 Units Intracatheter PRN Milad Leone MD       • insulin aspart (novoLOG) injection 0-14 Units  0-14 Units Subcutaneous TID AC Cheyanne Valencia DO   3 Units at 04/12/22 1216   • insulin detemir (LEVEMIR) injection 10 Units  10 Units Subcutaneous Q12H Cheyanne Valencia DO   10 Units at 04/12/22 0818   • ipratropium-albuterol (DUO-NEB) nebulizer solution 3 mL  3 mL Nebulization Q4H PRN Cheyanne Valencia DO       • isosorbide mononitrate (IMDUR) 24 hr tablet 30 mg  30 mg Oral Daily Cheyanne Valencia DO   30 mg at 04/12/22 0817   • ketoconazole (NIZORAL) 2 % cream 1 application  1 application Topical BID Cheyanne Valencia DO   1 application at 04/12/22 2110   • lactobacillus acidophilus (RISAQUAD) capsule 1 capsule  1 capsule Oral BID Cheyanne Valencia DO   1 capsule at 04/12/22 2106   • levothyroxine (SYNTHROID, LEVOTHROID) tablet 150 mcg  150 mcg Oral Daily Cheyanne Valencia DO   150 mcg at 04/12/22 0817   • lidocaine (LIDODERM) 5 % 1 patch  1 patch Transdermal Q24H Cheyanne Valencia DO   1 patch at 04/12/22 2111   • metoprolol succinate XL (TOPROL-XL) 24 hr tablet 125 mg  125 mg Oral Q24H Milad Leone MD   125 mg at 04/12/22 0817   • ondansetron (ZOFRAN) injection 4 mg  4 mg Intravenous Q6H PRN Cheyanne Valencia DO       • pantoprazole (PROTONIX) EC tablet 40 mg  40 mg Oral Daily Cheyanne Valencia DO   40 mg at 04/12/22 0817   • QUEtiapine (SEROquel) tablet 12.5 mg  12.5 mg Oral Nightly Milad Leone MD   12.5 mg at 04/12/22 2107   • silver sulfadiazine (SILVADENE, SSD) 1 % cream 1 application  1 application Topical Q12H Cheyanne Valencia DO   1 application at 04/12/22 2109   • sodium chloride 0.9 % bolus 1,000 mL  1,000 mL Intravenous PRN Milad Leone MD       • sodium chloride 0.9 % flush 10 mL  10 mL  Intravenous PRN Karrick, Cheyanne Syed, DO       • sodium chloride 0.9 % flush 10 mL  10 mL Intravenous Q12H Karrick, Cheyanne Syed, DO   10 mL at 04/12/22 2113   • sodium chloride 0.9 % flush 10 mL  10 mL Intravenous Q12H Karrick, Cheyanne Syed, DO   10 mL at 04/12/22 2113   • sodium chloride 0.9 % flush 10 mL  10 mL Intravenous Q12H Karrick, Cheyanne Syed, DO   10 mL at 04/12/22 2113   • sodium chloride 0.9 % flush 10 mL  10 mL Intravenous PRN Karrick, Cheyanne Syed, DO       • sodium chloride 0.9 % flush 20 mL  20 mL Intravenous PRN Karrick, Cheyanne Syed, DO       • sodium chloride 0.9 % flush 3 mL  3 mL Intravenous Q12H Karrick, Cheyanne Syed, DO   3 mL at 04/12/22 2113   • sodium chloride 0.9 % flush 3-10 mL  3-10 mL Intravenous PRN Karrick, Cheyanne Syed, DO       • torsemide (DEMADEX) tablet 100 mg  100 mg Oral Daily Milad Leone MD   100 mg at 04/12/22 0817       apixaban, 2.5 mg, Oral, Q12H  aspirin, 81 mg, Oral, Daily  b complex-vitamin c-folic acid, 1 tablet, Oral, Daily  docusate sodium, 100 mg, Oral, BID  escitalopram, 10 mg, Oral, Daily  ferrous sulfate, 324 mg, Oral, BID With Meals  insulin aspart, 0-14 Units, Subcutaneous, TID AC  insulin detemir, 10 Units, Subcutaneous, Q12H  isosorbide mononitrate, 30 mg, Oral, Daily  ketoconazole, 1 application, Topical, BID  lactobacillus acidophilus, 1 capsule, Oral, BID  levothyroxine, 150 mcg, Oral, Daily  lidocaine, 1 patch, Transdermal, Q24H  metoprolol succinate XL, 125 mg, Oral, Q24H  pantoprazole, 40 mg, Oral, Daily  QUEtiapine, 12.5 mg, Oral, Nightly  silver sulfadiazine, 1 application, Topical, Q12H  sodium chloride, 10 mL, Intravenous, Q12H  sodium chloride, 10 mL, Intravenous, Q12H  sodium chloride, 10 mL, Intravenous, Q12H  sodium chloride, 3 mL, Intravenous, Q12H  torsemide, 100 mg, Oral, Daily        IV Meds:        Results Reviewed:   I have personally reviewed the results from the time of this admission to 4/13/2022 08:32 EDT     Results from  last 7 days   Lab Units 04/13/22  0543 04/12/22  0526 04/11/22  0626   SODIUM mmol/L 137 135* 137   POTASSIUM mmol/L 4.2 4.1 4.3   CHLORIDE mmol/L 101 100 102   CO2 mmol/L 22.2 24.5 20.9*   BUN mg/dL 41* 34* 49*   CREATININE mg/dL 3.53* 3.03* 3.45*   CALCIUM mg/dL 8.9 8.9 8.9   GLUCOSE mg/dL 107* 201* 114*       Estimated Creatinine Clearance: 22.6 mL/min (A) (by C-G formula based on SCr of 3.53 mg/dL (H)).    Results from last 7 days   Lab Units 04/13/22  0543 04/12/22  0526 04/11/22  0626   PHOSPHORUS mg/dL 4.3 3.8 4.6*             Results from last 7 days   Lab Units 04/10/22  0541 04/09/22  0541 04/08/22  0530 04/07/22  0540   WBC 10*3/mm3 6.44 6.34 6.05 4.82   HEMOGLOBIN g/dL 9.9* 9.8* 9.9* 10.4*   PLATELETS 10*3/mm3 100* 72* 64* 60*             Brief Urine Lab Results  (Last result in the past 365 days)      Color   Clarity   Blood   Leuk Est   Nitrite   Protein   CREAT   Urine HCG        03/30/22 1705 Yellow   Clear   Trace   Negative   Negative   >=300 mg/dL (3+)                 No results found for: UTPCR    Imaging Results (Last 24 Hours)     ** No results found for the last 24 hours. **              Assessment / Plan     ASSESSMENT:    End-stage renal disease: After denying to have dialysis finally she has agreed, because of multiple issues with the belly peritoneal dialysis catheter was not an option at this point, she ended up with a tunneled dialysis catheter.  We will make arrangements for her outpatient dialysis, once she is better we will have further discussion with the family if they still are interested and wants to do her home peritoneal dialysis.    Anemia: Hemoglobin appears to be fairly stable, she does have iron deficiency and because of acute infection cannot give any IV iron continue with oral iron and will start BI as an outpatient.    Chronic diastolic congestive heart failure (HCC): We will try to diurese with high-dose diuretics and see if she will respond to it.  Volume status  significantly better since on dialysis will take another 2 to 3 L until she gets to euvolemia.    Cor pulmonale, chronic (HCC): Longstanding history of untreated sleep apnea might be contributing.    Hypoglycemia: Oral hypoglycemic agents with prolonged effect from renal failure likely the cause.    Bilateral edema of lower extremity: She does not have much lower extremity edema most of the edema is in the abdominal and chest area appears to be having generalized anasarca.    Essential hypertension: Blood pressure is on the low side we will hold off all blood pressure medications we will go ahead and start her on midodrine to maintain blood pressure while aggressively diuresing her.  I will go ahead and stop the hydralazine today and optimize her volume status and continue with the metoprolol.    Acquired hypothyroidism: Continue home Synthroid dose.    Type 2 diabetes mellitus with nephropathy (HCC): As per hospitalist service.    Cellulitis of both lower extremities: IV antibiotics have been started.    Bradycardia: It has resolved continue to increase beta-blocker for blood pressure control.      PLAN:  Her outpatient schedule is Monday Wednesday Friday, will go ahead and keep her in the same schedule.  I encouraged her to participate in the physical therapy and Occupational Therapy.  All the outpatient dialysis arrangements have been made.  Details were discussed with the patient.     Details were also discussed with the hospitalist service as well as nursing staff.  She needs to go home with the current medications.  Please do not change any medication that she is on currently at the time of discharge.  Continue with rest of the current treatment plan, and monitor with surveillance labs.  Further recommendations will depend on clinical course of the patient during the current hospitalization.     Thank you for involving us in the care of Millicent Mckeon.  Please feel free to call with any questions.    Milad  MD Sulema  04/13/22  08:32 EDT       [facility]  [phone]      Much of this encounter note is an electronic transcription/translation of spoken language to printed text. The electronic translation of spoken language may permit erroneous, or at times, nonsensical words or phrases to be inadvertently transcribed; Although I have reviewed the note for such errors, some may still exist.

## 2022-04-14 ENCOUNTER — READMISSION MANAGEMENT (OUTPATIENT)
Dept: CALL CENTER | Facility: HOSPITAL | Age: 62
End: 2022-04-14

## 2022-04-14 VITALS
DIASTOLIC BLOOD PRESSURE: 74 MMHG | RESPIRATION RATE: 19 BRPM | SYSTOLIC BLOOD PRESSURE: 124 MMHG | HEART RATE: 100 BPM | BODY MASS INDEX: 43.93 KG/M2 | WEIGHT: 273.37 LBS | OXYGEN SATURATION: 95 % | TEMPERATURE: 98 F | HEIGHT: 66 IN

## 2022-04-14 LAB
ALBUMIN SERPL-MCNC: 3.4 G/DL (ref 3.5–5.2)
ANION GAP SERPL CALCULATED.3IONS-SCNC: 14 MMOL/L (ref 5–15)
BUN SERPL-MCNC: 26 MG/DL (ref 8–23)
BUN/CREAT SERPL: 8.8 (ref 7–25)
CALCIUM SPEC-SCNC: 8.8 MG/DL (ref 8.6–10.5)
CHLORIDE SERPL-SCNC: 100 MMOL/L (ref 98–107)
CO2 SERPL-SCNC: 23 MMOL/L (ref 22–29)
CREAT SERPL-MCNC: 2.95 MG/DL (ref 0.57–1)
EGFRCR SERPLBLD CKD-EPI 2021: 17.3 ML/MIN/1.73
GLUCOSE BLDC GLUCOMTR-MCNC: 109 MG/DL (ref 70–130)
GLUCOSE BLDC GLUCOMTR-MCNC: 148 MG/DL (ref 70–130)
GLUCOSE BLDC GLUCOMTR-MCNC: 169 MG/DL (ref 70–130)
GLUCOSE SERPL-MCNC: 173 MG/DL (ref 65–99)
PHOSPHATE SERPL-MCNC: 3.2 MG/DL (ref 2.5–4.5)
POTASSIUM SERPL-SCNC: 4 MMOL/L (ref 3.5–5.2)
SODIUM SERPL-SCNC: 137 MMOL/L (ref 136–145)

## 2022-04-14 PROCEDURE — 99239 HOSP IP/OBS DSCHRG MGMT >30: CPT | Performed by: INTERNAL MEDICINE

## 2022-04-14 PROCEDURE — 82962 GLUCOSE BLOOD TEST: CPT

## 2022-04-14 PROCEDURE — 63710000001 INSULIN ASPART PER 5 UNITS: Performed by: FAMILY MEDICINE

## 2022-04-14 PROCEDURE — 63710000001 ONDANSETRON PER 8 MG: Performed by: INTERNAL MEDICINE

## 2022-04-14 PROCEDURE — 63710000001 INSULIN DETEMIR PER 5 UNITS: Performed by: FAMILY MEDICINE

## 2022-04-14 PROCEDURE — 97110 THERAPEUTIC EXERCISES: CPT

## 2022-04-14 PROCEDURE — 80069 RENAL FUNCTION PANEL: CPT | Performed by: INTERNAL MEDICINE

## 2022-04-14 RX ORDER — ONDANSETRON 4 MG/1
4 TABLET, FILM COATED ORAL ONCE
Status: COMPLETED | OUTPATIENT
Start: 2022-04-14 | End: 2022-04-14

## 2022-04-14 RX ORDER — LEVOTHYROXINE SODIUM 0.15 MG/1
150 TABLET ORAL DAILY
Qty: 30 TABLET | Refills: 0
Start: 2022-04-14 | End: 2022-05-04 | Stop reason: HOSPADM

## 2022-04-14 RX ORDER — METOPROLOL SUCCINATE 50 MG/1
50 TABLET, EXTENDED RELEASE ORAL DAILY
Qty: 30 TABLET | Refills: 0 | Status: SHIPPED | OUTPATIENT
Start: 2022-04-14 | End: 2022-05-04 | Stop reason: HOSPADM

## 2022-04-14 RX ORDER — ONDANSETRON 2 MG/ML
4 INJECTION INTRAMUSCULAR; INTRAVENOUS ONCE
Status: DISCONTINUED | OUTPATIENT
Start: 2022-04-14 | End: 2022-04-14 | Stop reason: HOSPADM

## 2022-04-14 RX ADMIN — Medication 1 CAPSULE: at 08:29

## 2022-04-14 RX ADMIN — ISOSORBIDE MONONITRATE 30 MG: 30 TABLET, EXTENDED RELEASE ORAL at 08:30

## 2022-04-14 RX ADMIN — METOPROLOL SUCCINATE 125 MG: 25 TABLET, EXTENDED RELEASE ORAL at 08:29

## 2022-04-14 RX ADMIN — FERROUS SULFATE TAB EC 324 MG (65 MG FE EQUIVALENT) 324 MG: 324 (65 FE) TABLET DELAYED RESPONSE at 08:29

## 2022-04-14 RX ADMIN — Medication 1 TABLET: at 08:29

## 2022-04-14 RX ADMIN — PANTOPRAZOLE SODIUM 40 MG: 40 TABLET, DELAYED RELEASE ORAL at 08:29

## 2022-04-14 RX ADMIN — INSULIN DETEMIR 10 UNITS: 100 INJECTION, SOLUTION SUBCUTANEOUS at 08:36

## 2022-04-14 RX ADMIN — LEVOTHYROXINE SODIUM 150 MCG: 150 TABLET ORAL at 08:29

## 2022-04-14 RX ADMIN — APIXABAN 2.5 MG: 2.5 TABLET, FILM COATED ORAL at 08:28

## 2022-04-14 RX ADMIN — ESCITALOPRAM OXALATE 10 MG: 10 TABLET ORAL at 08:29

## 2022-04-14 RX ADMIN — INSULIN ASPART 3 UNITS: 100 INJECTION, SOLUTION INTRAVENOUS; SUBCUTANEOUS at 11:35

## 2022-04-14 RX ADMIN — TORSEMIDE 100 MG: 100 TABLET ORAL at 08:29

## 2022-04-14 RX ADMIN — ASPIRIN 81 MG: 81 TABLET, CHEWABLE ORAL at 08:29

## 2022-04-14 RX ADMIN — ONDANSETRON HYDROCHLORIDE 4 MG: 4 TABLET, FILM COATED ORAL at 17:15

## 2022-04-14 NOTE — CASE MANAGEMENT/SOCIAL WORK
Continued Stay Note   [facility]     Patient Name: Millicent Mckeon  MRN: [id number]  Today's Date: 4/14/2022    Admit Date: 3/30/2022     Discharge Plan     Row Name 04/14/22 1136       Plan    Plan called multiple times to [facility]; sent requested info by fax ; now awaiting md signature to send on note for their approval  11:45 EDT  Faxed signed note to [facility]  12:00 EDT  Called and spoke to Cookie/Rosette; she accepted pt through their [facility]; informed leaving today; informed md & rn; informed Val NUÑEZ pt will be coming home today and to be expecting her / very appreciative  12:14 EDT  Informed pt and completed imm               Discharge Codes    No documentation.               Expected Discharge Date and Time     Expected Discharge Date Expected Discharge Time    Apr 13, 2022             Kayley Mcnair RN     General

## 2022-04-14 NOTE — PLAN OF CARE
Goal Outcome Evaluation:           Progress: no change  Outcome Evaluation: Patient had dialysis today. Tolerated well. Denies pain. Morning ECG strip looked like patient was in 2nd degree AVB, Mobitz I. 12 lead ECG showed A Flutter. Afternoon strip showed A Flutter.    To be discharged today home with home health. To be transferred via EMS.

## 2022-04-14 NOTE — THERAPY TREATMENT NOTE
Patient Name: Millicent Mckeon  : 1960    MRN: [id number]                              Today's Date: 2022       Admit Date: 3/30/2022    Visit Dx:     ICD-10-CM ICD-9-CM   1. Hypoglycemia  E16.2 251.2   2. Bradycardia  R00.1 427.89   3. Failure to thrive in adult  R62.7 783.7   4. Pneumonia of right lung due to infectious organism, unspecified part of lung  J18.9 483.8   5. Acute renal failure, unspecified acute renal failure type ([facility])  N17.9 584.9   6. Pressure ulcer of right heel, unstageable ([facility])  L89.610 707.07     707.25   7. Obesity, morbid, BMI 50 or higher ([facility])  E66.01 278.01   8. Impaired functional mobility and activity tolerance  Z74.09 V49.89   9. Chronic kidney disease, stage IV (severe) ([facility])  N18.4 585.4   10. ESRD (end stage renal disease) ([facility])  N18.6 585.6   11. Impaired mobility and activities of daily living  Z74.09 V49.89    Z78.9      Patient Active Problem List   Diagnosis   • Altered mental status   • Bilateral edema of lower extremity   • Cellulitis of lower extremity   • Acute on chronic renal failure ([facility])   • GERD (gastroesophageal reflux disease)   • Hyperkalemia   • Essential hypertension   • Acquired hypothyroidism   • Type 2 diabetes mellitus with nephropathy ([facility])   • Vitamin B12 deficiency   • Cellulitis of both lower extremities   • Anemia due to stage 4 chronic kidney disease ([facility])   • Acute renal failure (ARF) ([facility])   • Hyperkalemia   • Impaired functional mobility and activity tolerance   • Chronic diastolic congestive heart failure ([facility])   • Paroxysmal atrial fibrillation with rapid ventricular response ([facility])   • Pulmonary hypertension ([facility])   • Cor pulmonale, chronic ([facility])   • Chronic venous hypertension involving both sides   • Lymphedema of both lower extremities   • Obesity, morbid, BMI 50 or higher ([facility])   • A-fib ([facility])   • CKD (chronic kidney disease) stage 3, GFR 30-59 ml/min ([facility])   • Acute bronchitis due to other specified organisms   •  Tachycardia-bradycardia syndrome (HCC)   • Pressure ulcer of right heel, unstageable (HCC)   • Mucopurulent chronic bronchitis (HCC)   • Acute pulmonary edema (HCC)   • Thrombocytopenia, unspecified (HCC)   • Chronic kidney disease, stage IV (severe) (HCC)   • Hypoglycemia   • Bradycardia     Past Medical History:   Diagnosis Date   • A-fib (HCC)    • Anemia 10/2/2018   • Diabetes mellitus (HCC)    • Disease of thyroid gland    • GERD (gastroesophageal reflux disease)    • Gout    • History of transfusion    • Hypertension      Past Surgical History:   Procedure Laterality Date   • EYE SURGERY     • INCISION AND DRAINAGE LEG Right 1/14/2019    Procedure: Right heel incision and drainage with graft application;  Surgeon: Ar Rueda DPM;  Location: [facility] OR;  Service: Podiatry   • INSERTION HEMODIALYSIS CATHETER N/A 4/5/2022    Procedure: HEMODIALYSIS CATHETER INSERTION;  Surgeon: Jean Carlos Guadalupe MD;  Location: [facility] OR;  Service: General;  Laterality: N/A;   • LEG SURGERY        General Information     Row Name 04/14/22 1205          OT Time and Intention    Document Type therapy note (daily note)  -SD     Mode of Treatment occupational therapy  -SD     Row Name 04/14/22 1203          General Information    Patient Profile Reviewed yes  -SD           User Key  (r) = Recorded By, (t) = Taken By, (c) = Cosigned By    Initials Name Provider Type    Hannah Williamson OT Occupational Therapist                 Mobility/ADL's     Row Name 04/14/22 1200          Bed Mobility    Comment, (Bed Mobility) patient in bed, declined EOB or OOB, did agree to fowlers. Discussed DC plan and importance of regular activity at home to prevent decline. Patient states she has a hospital bed at home and spends majority of time there but does get up and feed her animals and sweep, etc.  -SD           User Key  (r) = Recorded By, (t) = Taken By, (c) = Cosigned By    Initials Name Provider Type    Hannah Williamson OT  Occupational Therapist               Obj/Interventions     Row Name 04/14/22 1209          Shoulder (Therapeutic Exercise)    Shoulder (Therapeutic Exercise) strengthening exercise  -SD     Shoulder Strengthening (Therapeutic Exercise) yellow  -SD     Row Name 04/14/22 1209          Elbow/Forearm (Therapeutic Exercise)    Elbow/Forearm (Therapeutic Exercise) strengthening exercise  -SD     Elbow/Forearm Strengthening (Therapeutic Exercise) yellow  -SD     Row Name 04/14/22 1209          Wrist (Therapeutic Exercise)    Wrist (Therapeutic Exercise) strengthening exercise  -SD     Wrist Strengthening (Therapeutic Exercise) yellow  -SD     Row Name 04/14/22 1209          Hand (Therapeutic Exercise)    Hand (Therapeutic Exercise) strengthening exercise  -SD     Hand Strengthening (Therapeutic Exercise) squeeze ball/egg;20 repititions  -SD     Row Name 04/14/22 1209          Motor Skills    Therapeutic Exercise shoulder;elbow/forearm;wrist;hand  BUE ther ex using yellow theraband 1 set of 15. Provided patient with UB HEP  -SD           User Key  (r) = Recorded By, (t) = Taken By, (c) = Cosigned By    Initials Name Provider Type    Hannah Williamson OT Occupational Therapist               Goals/Plan    No documentation.                Clinical Impression     Row Name 04/14/22 1218          Pain Assessment    Pretreatment Pain Rating 0/10 - no pain  -SD     Posttreatment Pain Rating 0/10 - no pain  -SD     Row Name 04/14/22 1218          Plan of Care Review    Plan of Care Reviewed With patient  -SD     Progress improving  -SD     Outcome Evaluation OT tx completed. Patient declined EOB or OOB but did agree to fowlers position. Patient completed BUE ther ex using yellow theraband 1 x 15 and  strengthening using ball 1 x 20. Discussed importance of activity at home to prevent decline. Patient to DC home with brother.  -SD     Row Name 04/14/22 1218          Positioning and Restraints    Pre-Treatment Position in bed   -SD     Post Treatment Position bed  -SD     In Bed fowlers;call light within reach;encouraged to call for assist  -SD           User Key  (r) = Recorded By, (t) = Taken By, (c) = Cosigned By    Initials Name Provider Type    Hannah Williamson OT Occupational Therapist               Outcome Measures     Row Name 04/14/22 1226          How much help from another is currently needed...    Putting on and taking off regular lower body clothing? 2  -SD     Bathing (including washing, rinsing, and drying) 2  -SD     Toileting (which includes using toilet bed pan or urinal) 2  -SD     Putting on and taking off regular upper body clothing 3  -SD     Taking care of personal grooming (such as brushing teeth) 4  -SD     Eating meals 4  -SD     AM-PAC 6 Clicks Score (OT) 17  -SD     Row Name 04/14/22 1226          Functional Assessment    Outcome Measure Options AM-PAC 6 Clicks Daily Activity (OT)  -SD           User Key  (r) = Recorded By, (t) = Taken By, (c) = Cosigned By    Initials Name Provider Type    Hannah Williamson OT Occupational Therapist                Occupational Therapy Education                 Title: PT OT SLP Therapies (Done)     Topic: Occupational Therapy (Done)     Point: ADL training (Done)     Description:   Instruct learner(s) on proper safety adaptation and remediation techniques during self care or transfers.   Instruct in proper use of assistive devices.              Learning Progress Summary           Patient Acceptance, E, VU by MARIA DEL ROSARIO at 4/10/2022 0919      Show all documentation for this point (1)                 Point: Home exercise program (Done)     Description:   Instruct learner(s) on appropriate technique for monitoring, assisting and/or progressing therapeutic exercises/activities.              Learning Progress Summary           Patient Acceptance, E,TB, VU by SD at 4/14/2022 1226    Comment: Benefit of UB ther ex; UB HEP      Show all documentation for this point (1)                  Point: Precautions (Done)     Description:   Instruct learner(s) on prescribed precautions during self-care and functional transfers.              Learning Progress Summary           Patient Acceptance, E, VU by PK at 4/10/2022 0919                   Point: Body mechanics (Done)     Description:   Instruct learner(s) on proper positioning and spine alignment during self-care, functional mobility activities and/or exercises.              Learning Progress Summary           Patient Acceptance, E, VU by PK at 4/10/2022 0919                               User Key     Initials Effective Dates Name Provider Type Discipline    SD 06/16/21 -  Hannah Santos OT Occupational Therapist OT    PK 06/16/21 -  Genet Ko RN Registered Nurse Nurse              OT Recommendation and Plan     Plan of Care Review  Plan of Care Reviewed With: patient  Progress: improving  Outcome Evaluation: OT tx completed. Patient declined EOB or OOB but did agree to fowlers position. Patient completed BUE ther ex using yellow theraband 1 x 15 and  strengthening using ball 1 x 20. Discussed importance of activity at home to prevent decline. Patient to DC home with brother.     Time Calculation:    Time Calculation- OT     Row Name 04/14/22 1227             Time Calculation- OT    OT Start Time 1130  -SD      OT Stop Time 1148  -SD      OT Time Calculation (min) 18 min  -SD      OT Received On 04/14/22  -SD      OT Goal Re-Cert Due Date 04/16/22  -SD              Timed Charges    00434 - OT Therapeutic Exercise Minutes 18  -SD              Total Minutes    Timed Charges Total Minutes 18  -SD       Total Minutes 18  -SD            User Key  (r) = Recorded By, (t) = Taken By, (c) = Cosigned By    Initials Name Provider Type    SD Hannah Santos OT Occupational Therapist              Therapy Charges for Today     Code Description Service Date Service Provider Modifiers Qty    [id number]  OT THER PROC EA 15 MIN 4/14/2022 Hannah Santos  OT GO 1               Hannah Santos, OT  4/14/2022

## 2022-04-14 NOTE — PLAN OF CARE
Spoke to Jenni with [facility].  She confirmed palliative referral was in the system.  She reported that several PC referrals and may be longer than 1 week to see.  Requests made to f/u with pt to be seen or contacted next week to ensure she is still participating with dialysis.      Plan is for pt to discharge home today with home health and PC referral.  Discharge order is on chart.    1445    Received notification that pt would like to remain DNR/DNI for her transport home.  Visited pt in her room; she was alone.  Pt confirms that she is DNR/DNI and wants the EMS to be able to maintain that status.  EMS DNR form explained. Pt voiced understanding and signed form.  Pt's nurse and myself witnessed and original form was placed in pt's chart.  Pt's physical address is [address] [place] [address].    Pt requested something for nausea prior to leaving as she reports having severe motion sickness.  JANAE Barros updated.    15:35    Pt discharged home via EMS with [facility] and home palliative referral.

## 2022-04-14 NOTE — PLAN OF CARE
Goal Outcome Evaluation:  Plan of Care Reviewed With: patient        Progress: improving  Outcome Evaluation: OT tx completed. Patient declined EOB or OOB but did agree to fowlers position. Patient completed BUE ther ex using yellow theraband 1 x 15 and  strengthening using ball 1 x 20. Discussed importance of activity at home to prevent decline. Patient to DC home with brother.

## 2022-04-14 NOTE — PROGRESS NOTES
[facility] Progress Note  [facility]. [place]        Patient Name: Millicent Mckeon  : 1960  MRN: [id number]   LOS: 13 days    Patient Care Team:  Marianela Albright APRN as PCP - General (Family Medicine)  Geremias Shepherd MD as Consulting Physician (General Surgery)  Lisa Cardoso MD as Surgeon (General Surgery)    Chief Complaint:    Chief Complaint   Patient presents with   • Weakness - Generalized     Unable to stand     Primary Care Physician:  Marianela Albright APRN  Date of admission: 3/30/2022    Subjective     Interval History:   Events noted from last 24 hours.  She a lot more awake alert and interactive this morning.  It does appear that she is going home with home health.  She had a smile on her face when she heard that she is going home.  Review of Systems:   As noted above    Objective     Vitals:   Temp:  [97.9 °F (36.6 °C)-98.7 °F (37.1 °C)] 97.9 °F (36.6 °C)  Heart Rate:  [] 88  Resp:  [14-18] 18  BP: (118-145)/(63-75) 145/64    Intake/Output Summary (Last 24 hours) at 2022 0756  Last data filed at 2022 2300  Gross per 24 hour   Intake 360 ml   Output 4000 ml   Net -3640 ml       Physical Exam:    General Appearance: alert,  no acute distress   Skin: warm and dry  HEENT: oral mucosa normal, nonicteric sclera  Neck: supple, no JVD  Lungs: Still have decreased breath sounds at both bases.  Heart: RRR, normal S1 and S2  Abdomen: Obese, soft to firm, nontender, nondistended, she does have dependent edema mostly on the back of her body including sacral area.  : no palpable bladder  Extremities: 1 + edema, mostly it is dependent edema, no cyanosis or clubbing  Neuro: Awake and interactive, randomly move extremities.    Scheduled Meds:     Current Facility-Administered Medications   Medication Dose Route Frequency Provider Last Rate Last Admin   • acetaminophen (TYLENOL) tablet 650 mg  650 mg Oral Q4H PRN Cheyanne Valencia, DO   650 mg at  03/31/22 0629    Or   • acetaminophen (TYLENOL) 160 MG/5ML solution 650 mg  650 mg Oral Q4H PRN Cheyanne Valencia DO        Or   • acetaminophen (TYLENOL) suppository 650 mg  650 mg Rectal Q4H PRN Cheyanne Valencia DO       • albumin human 25 % IV SOLN 12.5 g  12.5 g Intravenous PRN Milad Leone MD       • apixaban (ELIQUIS) tablet 2.5 mg  2.5 mg Oral Q12H Doc Keith MD   2.5 mg at 04/13/22 2014   • aspirin chewable tablet 81 mg  81 mg Oral Daily Cheyanne Valencia DO   81 mg at 04/13/22 1431   • b complex-vitamin c-folic acid (NEPHRO-LOIS) tablet 1 tablet  1 tablet Oral Daily Cheyanne Valencia DO   1 tablet at 04/13/22 1510   • dextrose (D50W) (25 g/50 mL) IV injection 25 g  25 g Intravenous Q15 Min PRN Cheyanne Valencia DO       • dextrose (D50W) (25 g/50 mL) IV injection 50 mL  50 mL Intravenous Q1H PRN Cheyanne Valencia DO   50 mL at 03/31/22 0630   • dextrose (GLUTOSE) oral gel 1 tube  1 tube Oral Q15 Min PRN Cheyanne Valencia DO       • docusate sodium (COLACE) capsule 100 mg  100 mg Oral BID Cheyanne Valencia DO   100 mg at 04/13/22 2014   • escitalopram (LEXAPRO) tablet 10 mg  10 mg Oral Daily Milad Leone MD   10 mg at 04/13/22 1510   • ferrous sulfate EC tablet 324 mg  324 mg Oral BID With Meals Cheyanne Valencia DO   324 mg at 04/13/22 1433   • glucagon (human recombinant) (GLUCAGEN DIAGNOSTIC) injection 1 mg  1 mg Subcutaneous PRN Cheyanne Valencia DO       • heparin (porcine) injection 1,000 Units  1,000 Units Intracatheter PRN Cheyanne Valencia DO       • heparin (porcine) injection 1,000 Units  1,000 Units Intracatheter PRN Milad Leone MD   1,000 Units at 04/13/22 1256   • heparin (porcine) injection 1,000 Units  1,000 Units Intracatheter Milad Bianchi MD       • heparin (porcine) injection 1,000 Units  1,000 Units Intracatheter Milad Bianchi MD   1,000 Units at 04/11/22 1240   • heparin (porcine) injection 1,000  Units  1,000 Units Intracatheter PRN Milad Leone MD       • insulin aspart (novoLOG) injection 0-14 Units  0-14 Units Subcutaneous TID AC Cheyanne Valencia DO   3 Units at 04/12/22 1216   • insulin detemir (LEVEMIR) injection 10 Units  10 Units Subcutaneous Q12H Cheyanne Valencia DO   10 Units at 04/13/22 2020   • ipratropium-albuterol (DUO-NEB) nebulizer solution 3 mL  3 mL Nebulization Q4H PRN Cheyanne Valencia DO       • isosorbide mononitrate (IMDUR) 24 hr tablet 30 mg  30 mg Oral Daily Cheyanne Valencia DO   30 mg at 04/13/22 1433   • ketoconazole (NIZORAL) 2 % cream 1 application  1 application Topical BID Cheyanne Valencia DO   1 application at 04/13/22 2018   • lactobacillus acidophilus (RISAQUAD) capsule 1 capsule  1 capsule Oral BID Cheyanne Valencia DO   1 capsule at 04/13/22 2020   • levothyroxine (SYNTHROID, LEVOTHROID) tablet 150 mcg  150 mcg Oral Daily Cheyanne Valencia DO   150 mcg at 04/13/22 1511   • lidocaine (LIDODERM) 5 % 1 patch  1 patch Transdermal Q24H Cheyanne Valencia DO   1 patch at 04/13/22 2013   • metoprolol succinate XL (TOPROL-XL) 24 hr tablet 125 mg  125 mg Oral Q24H Milad Leone MD   125 mg at 04/13/22 1511   • ondansetron (ZOFRAN) injection 4 mg  4 mg Intravenous Q6H PRN Cheyanne Valencia DO       • pantoprazole (PROTONIX) EC tablet 40 mg  40 mg Oral Daily Cheyanne Valencia DO   40 mg at 04/13/22 1435   • QUEtiapine (SEROquel) tablet 12.5 mg  12.5 mg Oral Nightly Milad Leone MD   12.5 mg at 04/13/22 2014   • silver sulfadiazine (SILVADENE, SSD) 1 % cream 1 application  1 application Topical Q12H Cheyanne Valencia DO   1 application at 04/13/22 2017   • sodium chloride 0.9 % bolus 1,000 mL  1,000 mL Intravenous PRN Milad Leone MD       • sodium chloride 0.9 % flush 10 mL  10 mL Intravenous PRN Cheyanne Valencia DO       • sodium chloride 0.9 % flush 10 mL  10 mL Intravenous Q12H Cheyanne Valencia  Syed, DO   10 mL at 04/13/22 2017   • sodium chloride 0.9 % flush 10 mL  10 mL Intravenous Q12H Karrick, Cheyanne Syed, DO   10 mL at 04/13/22 2017   • sodium chloride 0.9 % flush 10 mL  10 mL Intravenous Q12H Karrick, Cheyanne Syed, DO   10 mL at 04/13/22 2016   • sodium chloride 0.9 % flush 10 mL  10 mL Intravenous PRN Karrick, Cheyanne Syed, DO       • sodium chloride 0.9 % flush 20 mL  20 mL Intravenous PRN Karrick, Cheyanne Syed, DO       • sodium chloride 0.9 % flush 3 mL  3 mL Intravenous Q12H Karrick, Cheyanne Syed, DO   3 mL at 04/13/22 2016   • sodium chloride 0.9 % flush 3-10 mL  3-10 mL Intravenous PRN Karrick, Cheyanne Syed, DO       • torsemide (DEMADEX) tablet 100 mg  100 mg Oral Daily Milad Lenoe MD   100 mg at 04/13/22 1512       apixaban, 2.5 mg, Oral, Q12H  aspirin, 81 mg, Oral, Daily  b complex-vitamin c-folic acid, 1 tablet, Oral, Daily  docusate sodium, 100 mg, Oral, BID  escitalopram, 10 mg, Oral, Daily  ferrous sulfate, 324 mg, Oral, BID With Meals  insulin aspart, 0-14 Units, Subcutaneous, TID AC  insulin detemir, 10 Units, Subcutaneous, Q12H  isosorbide mononitrate, 30 mg, Oral, Daily  ketoconazole, 1 application, Topical, BID  lactobacillus acidophilus, 1 capsule, Oral, BID  levothyroxine, 150 mcg, Oral, Daily  lidocaine, 1 patch, Transdermal, Q24H  metoprolol succinate XL, 125 mg, Oral, Q24H  pantoprazole, 40 mg, Oral, Daily  QUEtiapine, 12.5 mg, Oral, Nightly  silver sulfadiazine, 1 application, Topical, Q12H  sodium chloride, 10 mL, Intravenous, Q12H  sodium chloride, 10 mL, Intravenous, Q12H  sodium chloride, 10 mL, Intravenous, Q12H  sodium chloride, 3 mL, Intravenous, Q12H  torsemide, 100 mg, Oral, Daily        IV Meds:        Results Reviewed:   I have personally reviewed the results from the time of this admission to 4/14/2022 07:56 EDT     Results from last 7 days   Lab Units 04/14/22  0524 04/13/22  0543 04/12/22  0526   SODIUM mmol/L 137 137 135*   POTASSIUM mmol/L 4.0 4.2  4.1   CHLORIDE mmol/L 100 101 100   CO2 mmol/L 23.0 22.2 24.5   BUN mg/dL 26* 41* 34*   CREATININE mg/dL 2.95* 3.53* 3.03*   CALCIUM mg/dL 8.8 8.9 8.9   GLUCOSE mg/dL 173* 107* 201*       Estimated Creatinine Clearance: 26.2 mL/min (A) (by C-G formula based on SCr of 2.95 mg/dL (H)).    Results from last 7 days   Lab Units 04/14/22  0524 04/13/22  0543 04/12/22  0526   PHOSPHORUS mg/dL 3.2 4.3 3.8             Results from last 7 days   Lab Units 04/10/22  0541 04/09/22  0541 04/08/22  0530   WBC 10*3/mm3 6.44 6.34 6.05   HEMOGLOBIN g/dL 9.9* 9.8* 9.9*   PLATELETS 10*3/mm3 100* 72* 64*             Brief Urine Lab Results  (Last result in the past 365 days)      Color   Clarity   Blood   Leuk Est   Nitrite   Protein   CREAT   Urine HCG        03/30/22 1705 Yellow   Clear   Trace   Negative   Negative   >=300 mg/dL (3+)                 No results found for: UTPCR    Imaging Results (Last 24 Hours)     ** No results found for the last 24 hours. **              Assessment / Plan     ASSESSMENT:    End-stage renal disease: After denying to have dialysis finally she has agreed, because of multiple issues with the belly peritoneal dialysis catheter was not an option at this point, she ended up with a tunneled dialysis catheter.  We will make arrangements for her outpatient dialysis, once she is better we will have further discussion with the family if they still are interested and wants to do her home peritoneal dialysis.    Anemia: Hemoglobin appears to be fairly stable, she does have iron deficiency and because of acute infection cannot give any IV iron continue with oral iron and will start BI as an outpatient.    Chronic diastolic congestive heart failure (HCC): We will try to diurese with high-dose diuretics and see if she will respond to it.  Volume status significantly better since on dialysis will take another 2 to 3 L until she gets to euvolemia.    Cor pulmonale, chronic (HCC): Longstanding history of untreated  sleep apnea might be contributing.    Hypoglycemia: Oral hypoglycemic agents with prolonged effect from renal failure likely the cause.    Bilateral edema of lower extremity: She does not have much lower extremity edema most of the edema is in the abdominal and chest area appears to be having generalized anasarca.    Essential hypertension: Blood pressure is on the low side we will hold off all blood pressure medications we will go ahead and start her on midodrine to maintain blood pressure while aggressively diuresing her.  I will go ahead and stop the hydralazine today and optimize her volume status and continue with the metoprolol.    Acquired hypothyroidism: Continue home Synthroid dose.    Type 2 diabetes mellitus with nephropathy (HCC): As per hospitalist service.    Cellulitis of both lower extremities: IV antibiotics have been started.    Bradycardia: It has resolved continue to increase beta-blocker for blood pressure control.      PLAN:  Her outpatient schedule is Monday Wednesday Friday, will go ahead and keep her in the same schedule.  I encouraged her to participate in the physical therapy and Occupational Therapy.  All the outpatient dialysis arrangements have been made.  Her schedule is at 7 AM.  She needs to be at the clinic around that time.  I will see her during dialysis next week.  If she is still in the hospital by tomorrow dialysis arrangements will be made for her.  Details were discussed with the patient.     Details were also discussed with the hospitalist service as well as nursing staff.  She needs to go home with the current medications.  Please do not change any medication that she is on currently at the time of discharge.  Continue with rest of the current treatment plan, and monitor with surveillance labs.  Further recommendations will depend on clinical course of the patient during the current hospitalization.     Thank you for involving us in the care of Millicent Mckeon.  Please feel  free to call with any questions.    Milad Leone MD  04/14/22  07:56 EDT       [facility]  [phone]      Much of this encounter note is an electronic transcription/translation of spoken language to printed text. The electronic translation of spoken language may permit erroneous, or at times, nonsensical words or phrases to be inadvertently transcribed; Although I have reviewed the note for such errors, some may still exist.

## 2022-04-15 NOTE — OUTREACH NOTE
Prep Survey    Flowsheet Row Responses   [facility] facility patient discharged from? [facility]   Is LACE score < 7 ? No   Emergency Room discharge w/ pulse ox? No   Eligibility Readm Mgmt   Discharge diagnosis Generalized weakness, hypoglycemia, bradycardia, sepsis from Cellulitis of both lower extremities, ESRD   Does the patient have one of the following disease processes/diagnoses(primary or secondary)? Sepsis   Does the patient have Home health ordered? Yes   What is the Home health agency?  [facility]   Is there a DME ordered? No   Prep survey completed? Yes          MILAGROS HUNTER - Registered Nurse

## 2022-04-16 NOTE — DISCHARGE SUMMARY
[facility]   DISCHARGE SUMMARY      Name:  Millicent Mckeon   Age:  61 y.o.  Sex:  female  :  1960  MRN:  [id number]   Visit Number:  [id number]    Admission Date:  3/30/2022  Date of Discharge:  2022  Primary Care Physician:  Marianela Albright APRN    Important issues to note:    -Palliative care referral at discharge.  -She was discharged to home where she will have a family member with her .   -She was discharged with home health services pt/ot/skilled nursing; wound care saw this patient and their recommendations are included in this document.  -the patient received 5 days of zosyn followed by 5 days of augmentin during this hospitalization.  -She had tunneled dialysis catheter placed and initiated HD on  for her progressive CKD; she will receive HD MWF.  -Many of her home medications were discontinued or changed and many new medications were started; as listed below.  -She will follow up with PCP in 1 week and Nephrology in 2 weeks.    Discharge Diagnoses:     Sepsis from b/l LE cellulitis or pneumonia  Progressive worsening of CKD a/w volume overload; HD via tunneled catheter initiated 22.  Symptomatic bradycardia, resolved    Problem List:     Active Hospital Problems    Diagnosis  POA   • **Hypoglycemia [E16.2]  Yes   • Bradycardia [R00.1]  Yes   • Chronic kidney disease, stage IV (severe) (HCC) [N18.4]  Yes   • Chronic diastolic congestive heart failure (HCC) [I50.32]  Yes   • Cor pulmonale, chronic ([facility]) [I27.81]  Yes   • Anemia due to stage 4 chronic kidney disease (HCC) [N18.4, D63.1]  Yes   • Acquired hypothyroidism [E03.9]  Yes   • Cellulitis of both lower extremities [L03.115, L03.116]  Yes   • Essential hypertension [I10]  Yes   • Type 2 diabetes mellitus with nephropathy (HCC) [E11.21]  Yes   • Bilateral edema of lower extremity [R60.0]  Yes      Resolved Hospital Problems   No resolved problems to display.     Presenting Problem:    Chief Complaint    Patient presents with   • Weakness - Generalized     Unable to stand      Consults:     Consulting Physician(s)  Chat With All Active Members    Provider Relationship Specialty    Milad Leone MD  Consulting Physician Nephrology    Doc Keith MD Consulting Physician Hospitalist    Jean Carlos Guadalupe MD  Consulting Physician General Surgery        Procedures Performed:    Procedure(s):  HEMODIALYSIS CATHETER INSERTION    History of presenting illness/Hospital Course:    The patient is a 61-year-old female with history of atrial fibrillation, chronic systolic congestive heart failure, CKD stage IV, chronic cor pulmonale, morbid obesity, functional quadriplegia who had presented to the emergency room with generalized weakness.  She had apparently been found at home disheveled and with multiple skin sores and wounds.  She had recently been treated on multiple occasions for cellulitis of the lower extremities and hypoglycemia.       Initial work-up demonstrated bradycardia, hypoglycemia, with creatinine 3.74 and a BUN of 75.  Hemoglobin 10.8.  Chest x-ray show cardiomegaly and bilateral opacity in the lower zones.  She did develop some low blood pressures and became hypothermic upon admission.  She was started on midodrine and IV Lasix and had Wilkinson catheter placed.  There was concern for sepsis and because she was recently hospitalized she was started on Zosyn and vancomycin.  She had a left internal jugular central venous line placed on 3/31/2022.  She had improvement in her blood pressures.  Her MRSA swab was negative, vancomycin was discontinued. The source of sepsis was thought to be b/l LE cellulitis or possible pneumonia. She received 5 days of zosyn followed by 5 days of augmentin; her sepsis and cellulitis resolved.       She was noted to have overall worsening kidney function, had discussions about dialysis and initially refused.  After discussion with patient's niece, patient is now in agreement with  trial of dialysis and had tunneled dialysis catheter placed and initiation of HD on 4/5/2022.  Outpatient HD MWF was set up. Many of the patient's medications were discontinued and changed; many new medications were started.    Patient was seen by palliative care services.  She did elect to be a DNR/DNI. Palliative care will continue to follow the patient in the outpatient setting.     The hospitalization was prolonged because inability to safely discharge the patient. Family initially did not think they could take care of the patient but by the time of discharge said that someone could be at home with her at all times.     Physical Exam:    General Appearance:  Alert. NAD. Chronically ill appearing. Morbidly obese.    Head:  Atraumatic and normocephalic.   Eyes: Conjunctivae and sclerae normal, no icterus. No pallor.   Ears:  Ears with no abnormalities noted.   Throat: No oral lesions, no thrush, oral mucosa moist.   Neck: Supple, trachea midline, no thyromegaly.   Back:   No tenderness to palpation.   Lungs:   Breath sounds heard bilaterally equally.  No crackles or wheezing. No Pleural rub or bronchial breathing.   Heart:  Normal S1 and S2, no murmur, no gallop, no rub. No JVD.   Abdomen:   Normal bowel sounds, no masses, no organomegaly. Soft, nontender, nondistended, no rebound tenderness.   Extremities: Dressings b/l LE c/d/i..   Pulses: Pulses palpable bilaterally.   Skin: No bleeding or rash.   Neurologic: Alert and oriented. No facial asymmetry. Moves all four limbs. No tremors.     Pertinent Lab Results:     Results from last 7 days   Lab Units 04/14/22  0524 04/13/22  0543 04/12/22  0526   SODIUM mmol/L 137 137 135*   POTASSIUM mmol/L 4.0 4.2 4.1   CHLORIDE mmol/L 100 101 100   CO2 mmol/L 23.0 22.2 24.5   BUN mg/dL 26* 41* 34*   CREATININE mg/dL 2.95* 3.53* 3.03*   CALCIUM mg/dL 8.8 8.9 8.9   GLUCOSE mg/dL 173* 107* 201*     Results from last 7 days   Lab Units 04/10/22  0541   WBC 10*3/mm3 6.44    HEMOGLOBIN g/dL 9.9*   HEMATOCRIT % 33.2*   PLATELETS 10*3/mm3 100*                                   Pertinent Radiology Results:    Imaging Results (All)     Procedure Component Value Units Date/Time    XR Chest 1 View [[id number]] Collected: 04/05/22 1653     Updated: 04/05/22 1654    Narrative:      FINAL REPORT    CLINICAL HISTORY:  line    FINDINGS:  SINGLE VIEW CHEST  There is cardiomegaly and pulmonary vascular  congestion.  The mediastinum is unremarkable.  Right-sided deep  line terminates at the cavoatrial junction.  Left sided deep  line terminates in the lower SVC.  There are mild pulmonary  opacities, favor edema.  There is no pneumothorax.      Impression:      Deep lines as detailed above.  Pulmonary opacities, favor edema.    Authenticated by Ashok Benito III, MD on 04/05/2022  04:53:45 PM    FL C Arm During Surgery [[id number]] Resulted: 04/05/22 1453     Updated: 04/05/22 1453    Narrative:      This procedure was auto-finalized with no dictation required.    XR Chest 1 View [[id number]] Collected: 03/31/22 1813     Updated: 03/31/22 1816    Narrative:      FINAL REPORT    CLINICAL HISTORY:  central line placement verification    FINDINGS:  COMPARISON: None   FINDINGS: The heart size is enlarged.  There  is a left internal jugular line with the tip in the region of  the distal SVC.  Patient is rotated to the right.  Multiple  overlying EKG leads noted.  There is pulmonary vascular  congestion.  No definite pleural effusions seen..  The  mediastinum is within normal limits.  There are mild, bilateral  perihilar infiltrates suggesting mild CHF.  There is no pleural  effusion.  There is no pneumothorax.  The bony thorax is intact.      Impression:      Left internal jugular catheter with tip in the region of the  distal SVC; no pneumothorax identified.  Evidence of mild  congestive heart failure.    Authenticated by Cookie Abarca MD on 03/31/2022 06:13:47 PM    XR Chest 1 View [[id number]]  Collected: 03/31/22 0757     Updated: 03/31/22 0811    Narrative:      PORTABLE CHEST  3/30/2022 4:40 PM     HISTORY: AMS     COMPARISON:  March 4, 2022     FINDINGS: The cardiac silhouette is enlarged with severe right atrial  enlargement. Pulmonary vascular congestion.  No focal lung  consolidation. There is no pneumothorax. The visualized osseous  structures demonstrate no acute abnormalities.       Impression:      Cardiomegaly with central pulmonary vascular congestion.                 This report was signed and finalized on 3/31/2022 7:58 AM by Twila Field MD.          Echo:    Results for orders placed during the hospital encounter of 03/04/22    Adult Transthoracic Echo Complete W/ Cont if Necessary Per Protocol    Interpretation Summary  · The left ventricular cavity is mildly dilated.  · Left ventricular wall thickness is consistent with mild concentric hypertrophy.  · Estimated left ventricular EF = 48% Left ventricular ejection fraction appears to be 46 - 50%. Left ventricular systolic function is low normal.  · Left ventricular diastolic function is consistent with (grade II w/high LAP) pseudonormalization.  · The right atrial cavity is mildly dilated.  · The right ventricular cavity is mildly dilated.  · There is moderate calcification of the aortic valve mainly affecting the non-coronary, left coronary and right coronary cusp(s).  · Moderate to severe tricuspid valve regurgitation is present.  · Estimated right ventricular systolic pressure from tricuspid regurgitation is markedly elevated (>55 mmHg). Calculated right ventricular systolic pressure from tricuspid regurgitation is 63 mmHg.  · Severe pulmonary hypertension is present.    Condition on Discharge:      Stable.    Code status during the hospital stay:    Code Status and Medical Interventions:   Ordered at: 03/30/22 1739     Medical Intervention Limits:    NO intubation (DNI)    NO cardioversion    NO dialysis     Level Of Support  Discussed With:    Patient     Code Status (Patient has no pulse and is not breathing):    No CPR (Do Not Attempt to Resuscitate)     Medical Interventions (Patient has pulse or is breathing):    Limited Support     Discharge Disposition:    Home-Health Care [facility]    Discharge Medications:       Discharge Medications      New Medications      Instructions Start Date   b complex-vitamin c-folic acid 0.8 MG tablet tablet   Take 1 tablet by mouth Daily      escitalopram 10 MG tablet  Commonly known as: LEXAPRO   Take 1 tablet by mouth Daily      insulin detemir 100 UNIT/ML injection  Commonly known as: LEVEMIR   Inject 10 Units under the skin into the appropriate area as directed Daily      lactobacillus acidophilus capsule capsule   Take 1 capsule by mouth 2 (Two) Times a Day      metoprolol succinate  MG 24 hr tablet  Commonly known as: TOPROL-XL   Take 1 tablet by mouth Daily      metoprolol succinate XL 50 MG 24 hr tablet  Commonly known as: TOPROL-XL   Take 1 tablet by mouth Daily      QUEtiapine 25 MG tablet  Commonly known as: SEROquel   Take 1/2 tablet by mouth Every Night      torsemide 100 MG tablet  Commonly known as: DEMADEX   Take 1 tablet by mouth Daily         Changes to Medications      Instructions Start Date   isosorbide mononitrate 30 MG 24 hr tablet  Commonly known as: IMDUR  What changed: Another medication with the same name was removed. Continue taking this medication, and follow the directions you see here.   30 mg, Oral, Daily      levothyroxine 150 MCG tablet  Commonly known as: SYNTHROID, LEVOTHROID  What changed:   · medication strength  · how much to take   Take 1 tablet by mouth Daily      levothyroxine 150 MCG tablet  Commonly known as: SYNTHROID, LEVOTHROID  What changed: You were already taking a medication with the same name, and this prescription was added. Make sure you understand how and when to take each.   150 mcg, Oral, Daily         Continue These Medications       Instructions Start Date   apixaban 5 MG tablet tablet  Commonly known as: ELIQUIS   5 mg, Oral, Every 12 Hours Scheduled      aspirin 81 MG chewable tablet   81 mg, Oral, Daily      docusate sodium 100 MG capsule  Commonly known as: COLACE   100 mg, Oral, 2 Times Daily      ferrous sulfate 324 (65 Fe) MG tablet delayed-release EC tablet   324 mg, Oral, 2 Times Daily With Meals      Jobst Opaque Knee 20-30mmHg XL misc   1 application, Does not apply, Daily      lidocaine 5 %  Commonly known as: LIDODERM   1 patch, Transdermal, Every 24 Hours, Apply patch to left knee daily, on at 0700 am off at 2000       pantoprazole 40 MG EC tablet  Commonly known as: PROTONIX   40 mg, Oral, Daily         Stop These Medications    acetaminophen 325 MG tablet  Commonly known as: TYLENOL     amiodarone 100 MG tablet  Commonly known as: PACERONE     ammonium lactate 12 % cream  Commonly known as: AMLACTIN     arginine 500 MG tablet     bumetanide 1 MG tablet  Commonly known as: Bumex     insulin aspart 100 UNIT/ML injection  Commonly known as: novoLOG     loperamide 2 MG capsule  Commonly known as: IMODIUM     metOLazone 5 MG tablet  Commonly known as: ZAROXOLYN     metoprolol tartrate 100 MG tablet  Commonly known as: LOPRESSOR     multivitamin tablet tablet     nitroglycerin 0.4 MG SL tablet  Commonly known as: NITROSTAT     ondansetron ODT 4 MG disintegrating tablet  Commonly known as: ZOFRAN-ODT     polyethylene glycol packet  Commonly known as: MIRALAX     PROTEIN SUPPLEMENT 80% PO     RA Vitamin C 500 MG tablet  Generic drug: ascorbic acid     saccharomyces boulardii 250 MG capsule  Commonly known as: FLORASTOR     simvastatin 20 MG tablet  Commonly known as: ZOCOR     spironolactone 25 MG tablet  Commonly known as: Aldactone     Zinc Sulfate 220 (50 Zn) MG tablet          Discharge Diet:     Diet Instructions    Healthy heart/ diabetic         Activity at Discharge:     Activity Instructions    Activity as tolerated          Follow-up Appointments:    Additional Instructions for the Follow-ups that You Need to Schedule     Ambulatory Referral to Home Health   As directed      Wound care assessment/recommendations:  Recommend Cleanse bilateral lower legs with soap and water, pat dry, apply thin layer of silvadene to open areas, place abdominal pads and wrap with rolled gauze every 12 hours. When drainage decreases so dressings are not saturated between dressing changes will be able to reduce to daily dressing changes.   Skin with pitting edema with clear fluid filled blisters noted in scattered places. Abdomen peau de orange appearance. Folds moist. Slight bleeding noted from vaginal area. Recommend Check skin 4 times daily where lines and tubes touch. Place foam, abd pads or pillow cases to pad areas. Also use dry sheets to folds to decrease moisture.  Sacral area with suspected deep tissue injury complicated by moisture.    Order Comments: Wound care assessment/recommendations: Recommend Cleanse bilateral lower legs with soap and water, pat dry, apply thin layer of silvadene to open areas, place abdominal pads and wrap with rolled gauze every 12 hours. When drainage decreases so dressings are not saturated between dressing changes will be able to reduce to daily dressing changes. Skin with pitting edema with clear fluid filled blisters noted in scattered places. Abdomen peau de orange appearance. Folds moist. Slight bleeding noted from vaginal area. Recommend Check skin 4 times daily where lines and tubes touch. Place foam, abd pads or pillow cases to pad areas. Also use dry sheets to folds to decrease moisture. Sacral area with suspected deep tissue injury complicated by moisture.     Face to Face Visit Date: 4/13/2022    Follow-up provider for Plan of Care?: I treated the patient in an acute care facility and will not continue treatment after discharge.    Follow-up provider: SUN TRAN [[id number]]    Reason/Clinical Findings:  ability to perform adls below baseline, esrd, global intellectual disability, morbid obesity    Describe mobility limitations that make leaving home difficult: ability to perform adls below baseline, esrd, global intellectual disability, morbid obesity    Nursing/Therapeutic Services Requested: Skilled Nursing Physical Therapy Occupational Therapy    Skilled nursing orders: Medication education Wound care dressing/changes    PT orders: Strengthening Transfer training Gait Training Home safety assessment    Weight Bearing Status: As Tolerated    Occupational orders: Activities of daily living Home safety assessment Strengthening    Frequency: 1 Week 1         Ambulatory Referral to Home Health   As directed      Face to Face Visit Date: 4/14/2022    Follow-up provider for Plan of Care?: I treated the patient in an acute care facility and will not continue treatment after discharge.    Follow-up provider: SUN ALBRIGHT [[id number]]    Reason/Clinical Findings: impaired mobility, morbid obesity, esrd, multiple wounds    Describe mobility limitations that make leaving home difficult: impaired mobility, morbid obesity, esrd, multiple wounds    Nursing/Therapeutic Services Requested: Physical Therapy Occupational Therapy Skilled Nursing    Skilled nursing orders: Wound care dressing/changes Medication education    PT orders: Strengthening Home safety assessment    Occupational orders: Activities of daily living Home safety assessment Strengthening Cognition    Frequency: 1 Week 1         Discharge Follow-up with PCP   As directed       Currently Documented PCP:    Sun Albright APRN    PCP Phone Number:    [phone]     Follow Up Details: 1 week         Discharge Follow-up with PCP   As directed       Currently Documented PCP:    Sun Albright APRN    PCP Phone Number:    [phone]     Follow Up Details: 1week         Discharge Follow-up with Specified Provider: keya; 2 Weeks   As directed      To: keya    Follow  Up: 2 Weeks    Follow Up Details: esrd, hd initiated during this hospitalization            Contact information for follow-up providers     Milad Leone MD .    Specialties: Nephrology, Hospitalist  Why: Will see in Dialysis Clinic  Contact information:  [address] [place] [address]  [phone]             Marianela Albright APRN Follow up.    Specialty: Family Medicine  Why: the Office will call patient at home.  Contact information:  [address] [facility] [address]  [phone]             Marianela Albright APRN .    Specialty: Family Medicine  Why: 1week  Contact information:  [address] [facility] [address]  [phone]                   Contact information for after-discharge care     [facility]    Service: Dialysis  Contact information:  [address] [place] [address]  [phone]                 Home Medical Care     [facility] [place] .    Service: Home Health Services  Contact information:  [address] [facility] [address]  [facility] [address]  [phone]                           Future Appointments   Date Time Provider Department [place]   5/4/2022 11:15 AM Kevin Aceves MD [facility] CD [facility] MELY Keith MD  04/16/22  06:55 EDT    Time: I spent >30 minutes on this discharge activity which included: face-to-face encounter with the patient, reviewing the data in the system, coordination of the care with the nursing staff as well as consultants, documentation, and entering orders.

## 2022-04-18 NOTE — CASE MANAGEMENT/SOCIAL WORK
Case Management Discharge Note      Final Note: [facility] seen patient on 01/14/22  but did not meet patient    Provided Post Acute Provider List?: N/A  Provided Post Acute Provider Quality & Resource List?: N/A    Selected Continued Care - Discharged on 4/14/2022 Admission date: 3/30/2022 - Discharge disposition: Home-Health Care Svc    Destination    No services have been selected for the patient.              Durable Medical Equipment    No services have been selected for the patient.              Dialysis/Infusion Coordination complete.    Service Provider Selected Services Address Phone Fax Patient Preferred    [facility]  [facility] [address] [facility] [address] [phone] --          Home Medical Care Coordination complete.    Service Provider Selected Services Address Phone Fax Patient Preferred    [facility] [address] [place] [address] [address] [address] [phone] --          Therapy    No services have been selected for the patient.              Community Resources    No services have been selected for the patient.              Community & DME    No services have been selected for the patient.                Selected Continued Care - Prior Encounters Includes selections from prior encounters from 12/30/2021 to 4/14/2022    Discharged on 3/7/2022 Admission date: 3/4/2022 - Discharge disposition: Home-Health Care [facility] [place] [facility]    Home Medical Care     Service Provider Selected Services Address Phone Fax Patient Preferred    [facility] [address] [place] [address] [address] [address] [phone] --       Internal Comment last updated by Kayley Mcnair RN 3/7/2022 0829    Order faxed manually                                 Transportation Services  Ambulance: [facility]    Final Discharge Disposition Code: 01 - home or self-care

## 2022-04-19 ENCOUNTER — READMISSION MANAGEMENT (OUTPATIENT)
Dept: CALL CENTER | Facility: HOSPITAL | Age: 62
End: 2022-04-19

## 2022-04-19 NOTE — OUTREACH NOTE
Sepsis Week 1 Survey    Flowsheet Row Responses   [facility] facility patient discharged from? [facility]   Does the patient have one of the following disease processes/diagnoses(primary or secondary)? Sepsis   Week 1 attempt successful? No   Unsuccessful attempts Attempt 1          MALIK CAMARA - Registered Nurse

## 2022-04-25 ENCOUNTER — READMISSION MANAGEMENT (OUTPATIENT)
Dept: CALL CENTER | Facility: HOSPITAL | Age: 62
End: 2022-04-25

## 2022-04-25 NOTE — OUTREACH NOTE
Sepsis Week 2 Survey    Flowsheet Row Responses   [facility] facility patient discharged from? [facility]   Does the patient have one of the following disease processes/diagnoses(primary or secondary)? Sepsis   Week 2 attempt successful? No   Unsuccessful attempts Attempt 1          GOPI SAXENA - Registered Nurse

## 2022-05-01 ENCOUNTER — APPOINTMENT (OUTPATIENT)
Dept: CT IMAGING | Facility: HOSPITAL | Age: 62
End: 2022-05-01

## 2022-05-01 ENCOUNTER — HOSPITAL ENCOUNTER (OUTPATIENT)
Facility: HOSPITAL | Age: 62
LOS: 1 days | Discharge: HOME | End: 2022-05-04
Attending: EMERGENCY MEDICINE | Admitting: INTERNAL MEDICINE

## 2022-05-01 DIAGNOSIS — K92.2 GASTROINTESTINAL HEMORRHAGE, UNSPECIFIED GASTROINTESTINAL HEMORRHAGE TYPE: ICD-10-CM

## 2022-05-01 DIAGNOSIS — K92.0 COFFEE GROUND EMESIS: Primary | ICD-10-CM

## 2022-05-01 LAB
ABO GROUP BLD: NORMAL
ALBUMIN SERPL-MCNC: 4.1 G/DL (ref 3.5–5.2)
ALBUMIN/GLOB SERPL: 1 G/DL
ALP SERPL-CCNC: 213 U/L (ref 39–117)
ALT SERPL W P-5'-P-CCNC: 21 U/L (ref 1–33)
ANION GAP SERPL CALCULATED.3IONS-SCNC: 19.9 MMOL/L (ref 5–15)
ANISOCYTOSIS BLD QL: NORMAL
AST SERPL-CCNC: 27 U/L (ref 1–32)
BASOPHILS # BLD AUTO: 0.05 10*3/MM3 (ref 0–0.2)
BASOPHILS NFR BLD AUTO: 0.3 % (ref 0–1.5)
BILIRUB SERPL-MCNC: 0.8 MG/DL (ref 0–1.2)
BLD GP AB SCN SERPL QL: NEGATIVE
BUN SERPL-MCNC: 45 MG/DL (ref 8–23)
BUN/CREAT SERPL: 10.5 (ref 7–25)
CALCIUM SPEC-SCNC: 11 MG/DL (ref 8.6–10.5)
CHLORIDE SERPL-SCNC: 85 MMOL/L (ref 98–107)
CO2 SERPL-SCNC: 23.1 MMOL/L (ref 22–29)
CREAT SERPL-MCNC: 4.28 MG/DL (ref 0.57–1)
D-LACTATE SERPL-SCNC: 1.3 MMOL/L (ref 0.5–2)
D-LACTATE SERPL-SCNC: 2.1 MMOL/L (ref 0.5–2)
DEPRECATED RDW RBC AUTO: 62.2 FL (ref 37–54)
EGFRCR SERPLBLD CKD-EPI 2021: 11.1 ML/MIN/1.73
EOSINOPHIL # BLD AUTO: 0.03 10*3/MM3 (ref 0–0.4)
EOSINOPHIL NFR BLD AUTO: 0.2 % (ref 0.3–6.2)
ERYTHROCYTE [DISTWIDTH] IN BLOOD BY AUTOMATED COUNT: 22.7 % (ref 12.3–15.4)
GLOBULIN UR ELPH-MCNC: 4 GM/DL
GLUCOSE BLDC GLUCOMTR-MCNC: 406 MG/DL (ref 70–130)
GLUCOSE SERPL-MCNC: 417 MG/DL (ref 65–99)
HCT VFR BLD AUTO: 38.1 % (ref 34–46.6)
HCT VFR BLD AUTO: 38.2 % (ref 34–46.6)
HGB BLD-MCNC: 12.1 G/DL (ref 12–15.9)
HGB BLD-MCNC: 12.4 G/DL (ref 12–15.9)
HOLD SPECIMEN: NORMAL
HOLD SPECIMEN: NORMAL
HYPOCHROMIA BLD QL: NORMAL
IMM GRANULOCYTES # BLD AUTO: 0.07 10*3/MM3 (ref 0–0.05)
IMM GRANULOCYTES NFR BLD AUTO: 0.4 % (ref 0–0.5)
INR PPP: 1.18 (ref 0.9–1.1)
LIPASE SERPL-CCNC: 21 U/L (ref 13–60)
LYMPHOCYTES # BLD AUTO: 0.58 10*3/MM3 (ref 0.7–3.1)
LYMPHOCYTES NFR BLD AUTO: 3.4 % (ref 19.6–45.3)
MCH RBC QN AUTO: 24.9 PG (ref 26.6–33)
MCHC RBC AUTO-ENTMCNC: 32.5 G/DL (ref 31.5–35.7)
MCV RBC AUTO: 76.5 FL (ref 79–97)
MONOCYTES # BLD AUTO: 0.63 10*3/MM3 (ref 0.1–0.9)
MONOCYTES NFR BLD AUTO: 3.7 % (ref 5–12)
NEUTROPHILS NFR BLD AUTO: 15.85 10*3/MM3 (ref 1.7–7)
NEUTROPHILS NFR BLD AUTO: 92 % (ref 42.7–76)
NRBC BLD AUTO-RTO: 0 /100 WBC (ref 0–0.2)
OCCULT BLOOD, OTHER: POSITIVE
PLATELET # BLD AUTO: 130 10*3/MM3 (ref 140–450)
PMV BLD AUTO: 10.1 FL (ref 6–12)
POTASSIUM SERPL-SCNC: 3.9 MMOL/L (ref 3.5–5.2)
PROT SERPL-MCNC: 8.1 G/DL (ref 6–8.5)
PROTHROMBIN TIME: 15.4 SECONDS (ref 12.5–14.5)
RBC # BLD AUTO: 4.98 10*6/MM3 (ref 3.77–5.28)
RH BLD: POSITIVE
SMALL PLATELETS BLD QL SMEAR: NORMAL
SODIUM SERPL-SCNC: 128 MMOL/L (ref 136–145)
T&S EXPIRATION DATE: NORMAL
WBC MORPH BLD: NORMAL
WBC NRBC COR # BLD: 17.21 10*3/MM3 (ref 3.4–10.8)
WHOLE BLOOD HOLD SPECIMEN: NORMAL
WHOLE BLOOD HOLD SPECIMEN: NORMAL

## 2022-05-01 PROCEDURE — 99285 EMERGENCY DEPT VISIT HI MDM: CPT

## 2022-05-01 PROCEDURE — 96376 TX/PRO/DX INJ SAME DRUG ADON: CPT

## 2022-05-01 PROCEDURE — 36415 COLL VENOUS BLD VENIPUNCTURE: CPT

## 2022-05-01 PROCEDURE — 96366 THER/PROPH/DIAG IV INF ADDON: CPT

## 2022-05-01 PROCEDURE — 85025 COMPLETE CBC W/AUTO DIFF WBC: CPT

## 2022-05-01 PROCEDURE — 83605 ASSAY OF LACTIC ACID: CPT

## 2022-05-01 PROCEDURE — 85014 HEMATOCRIT: CPT | Performed by: EMERGENCY MEDICINE

## 2022-05-01 PROCEDURE — 74176 CT ABD & PELVIS W/O CONTRAST: CPT

## 2022-05-01 PROCEDURE — 96375 TX/PRO/DX INJ NEW DRUG ADDON: CPT

## 2022-05-01 PROCEDURE — 25010000002 DROPERIDOL PER 5 MG: Performed by: PHYSICIAN ASSISTANT

## 2022-05-01 PROCEDURE — 83690 ASSAY OF LIPASE: CPT | Performed by: PHYSICIAN ASSISTANT

## 2022-05-01 PROCEDURE — 85018 HEMOGLOBIN: CPT | Performed by: EMERGENCY MEDICINE

## 2022-05-01 PROCEDURE — 86850 RBC ANTIBODY SCREEN: CPT | Performed by: PHYSICIAN ASSISTANT

## 2022-05-01 PROCEDURE — 85610 PROTHROMBIN TIME: CPT | Performed by: PHYSICIAN ASSISTANT

## 2022-05-01 PROCEDURE — 82271 OCCULT BLOOD OTHER SOURCES: CPT | Performed by: PHYSICIAN ASSISTANT

## 2022-05-01 PROCEDURE — 86901 BLOOD TYPING SEROLOGIC RH(D): CPT | Performed by: PHYSICIAN ASSISTANT

## 2022-05-01 PROCEDURE — 63710000001 INSULIN DETEMIR PER 5 UNITS: Performed by: PHYSICIAN ASSISTANT

## 2022-05-01 PROCEDURE — 93005 ELECTROCARDIOGRAM TRACING: CPT | Performed by: PHYSICIAN ASSISTANT

## 2022-05-01 PROCEDURE — 87040 BLOOD CULTURE FOR BACTERIA: CPT

## 2022-05-01 PROCEDURE — 25010000002 METOCLOPRAMIDE PER 10 MG: Performed by: PHYSICIAN ASSISTANT

## 2022-05-01 PROCEDURE — 82962 GLUCOSE BLOOD TEST: CPT

## 2022-05-01 PROCEDURE — 25010000002 DIPHENHYDRAMINE PER 50 MG: Performed by: PHYSICIAN ASSISTANT

## 2022-05-01 PROCEDURE — 86900 BLOOD TYPING SEROLOGIC ABO: CPT | Performed by: PHYSICIAN ASSISTANT

## 2022-05-01 PROCEDURE — 85007 BL SMEAR W/DIFF WBC COUNT: CPT

## 2022-05-01 PROCEDURE — 25010000002 ONDANSETRON PER 1 MG: Performed by: PHYSICIAN ASSISTANT

## 2022-05-01 PROCEDURE — 96365 THER/PROPH/DIAG IV INF INIT: CPT

## 2022-05-01 PROCEDURE — 80053 COMPREHEN METABOLIC PANEL: CPT

## 2022-05-01 RX ORDER — DIPHENHYDRAMINE HYDROCHLORIDE 50 MG/ML
25 INJECTION INTRAMUSCULAR; INTRAVENOUS ONCE
Status: DISCONTINUED | OUTPATIENT
Start: 2022-05-01 | End: 2022-05-01

## 2022-05-01 RX ORDER — SODIUM CHLORIDE 0.9 % (FLUSH) 0.9 %
10 SYRINGE (ML) INJECTION AS NEEDED
Status: DISCONTINUED | OUTPATIENT
Start: 2022-05-01 | End: 2022-05-04 | Stop reason: HOSPADM

## 2022-05-01 RX ORDER — PANTOPRAZOLE SODIUM 40 MG/10ML
80 INJECTION, POWDER, LYOPHILIZED, FOR SOLUTION INTRAVENOUS ONCE
Status: COMPLETED | OUTPATIENT
Start: 2022-05-01 | End: 2022-05-01

## 2022-05-01 RX ORDER — METOCLOPRAMIDE HYDROCHLORIDE 5 MG/ML
10 INJECTION INTRAMUSCULAR; INTRAVENOUS ONCE
Status: COMPLETED | OUTPATIENT
Start: 2022-05-01 | End: 2022-05-01

## 2022-05-01 RX ORDER — DROPERIDOL 2.5 MG/ML
2.5 INJECTION, SOLUTION INTRAMUSCULAR; INTRAVENOUS ONCE
Status: DISCONTINUED | OUTPATIENT
Start: 2022-05-01 | End: 2022-05-01

## 2022-05-01 RX ORDER — DIPHENHYDRAMINE HYDROCHLORIDE 50 MG/ML
25 INJECTION INTRAMUSCULAR; INTRAVENOUS ONCE
Status: COMPLETED | OUTPATIENT
Start: 2022-05-01 | End: 2022-05-01

## 2022-05-01 RX ORDER — DROPERIDOL 2.5 MG/ML
2.5 INJECTION, SOLUTION INTRAMUSCULAR; INTRAVENOUS ONCE
Status: COMPLETED | OUTPATIENT
Start: 2022-05-01 | End: 2022-05-01

## 2022-05-01 RX ORDER — ONDANSETRON 2 MG/ML
4 INJECTION INTRAMUSCULAR; INTRAVENOUS ONCE
Status: COMPLETED | OUTPATIENT
Start: 2022-05-01 | End: 2022-05-01

## 2022-05-01 RX ADMIN — DIPHENHYDRAMINE HYDROCHLORIDE 25 MG: 50 INJECTION INTRAMUSCULAR; INTRAVENOUS at 20:11

## 2022-05-01 RX ADMIN — METOCLOPRAMIDE 10 MG: 5 INJECTION, SOLUTION INTRAMUSCULAR; INTRAVENOUS at 20:10

## 2022-05-01 RX ADMIN — ONDANSETRON 4 MG: 2 INJECTION INTRAMUSCULAR; INTRAVENOUS at 17:53

## 2022-05-01 RX ADMIN — PANTOPRAZOLE SODIUM 80 MG: 40 INJECTION, POWDER, FOR SOLUTION INTRAVENOUS at 17:27

## 2022-05-01 RX ADMIN — DROPERIDOL 2.5 MG: 2.5 INJECTION, SOLUTION INTRAMUSCULAR; INTRAVENOUS at 20:53

## 2022-05-01 RX ADMIN — PANTOPRAZOLE SODIUM 8 MG/HR: 40 INJECTION, POWDER, FOR SOLUTION INTRAVENOUS at 17:54

## 2022-05-01 RX ADMIN — INSULIN DETEMIR 25 UNITS: 100 INJECTION, SOLUTION SUBCUTANEOUS at 21:46

## 2022-05-01 NOTE — ED PROVIDER NOTES
Subjective   Chief Complaint: Vomiting blood  History of Present Illness: 61-year-old female with a history of A. fib, on Eliquis, diabetes, ESRD on dialysis Monday Wednesday Friday, hypertension comes in for evaluation of above complaint.  She states since dialysis on Friday she is been vomiting numerous times a day.  Is mostly brown but with some bright red blood in it.  She complains of some mild diffuse abdominal pain.  She denies blood in her stool diarrhea or any abnormal stool issues.  She was recently admitted at this facility for hypothermia hypoglycemia and failure to thrive but has been sent home with home health and doing okay until Friday.  Onset: Friday  Timing: Intermittent  Exacerbating / Alleviating factors: Worse with attempted p.o. intake  Associated symptoms: None      Nurses Notes reviewed and agree, including vitals, allergies, social history and prior medical history.          Review of Systems   Constitutional: Negative.    HENT: Negative.    Eyes: Negative.    Respiratory: Negative.    Cardiovascular: Negative.    Gastrointestinal: Positive for abdominal pain and vomiting.        Hematemesis   Genitourinary: Negative.    Musculoskeletal: Negative.    Skin: Negative.    Neurological: Negative.    Psychiatric/Behavioral: Negative.        Past Medical History:   Diagnosis Date   • A-fib (HCC)    • Anemia 10/2/2018   • Diabetes mellitus (HCC)    • Disease of thyroid gland    • GERD (gastroesophageal reflux disease)    • Gout    • History of transfusion    • Hypertension        Allergies   Allergen Reactions   • Metformin And Related Anaphylaxis     HA, diarrhea, throat swelling   • Metformin GI Intolerance       Past Surgical History:   Procedure Laterality Date   • EYE SURGERY     • INCISION AND DRAINAGE LEG Right 1/14/2019    Procedure: Right heel incision and drainage with graft application;  Surgeon: Ar Rueda DPM;  Location: [facility];  Service: Podiatry   • INSERTION HEMODIALYSIS  CATHETER N/A 4/5/2022    Procedure: HEMODIALYSIS CATHETER INSERTION;  Surgeon: Jean Carlos Guadalupe MD;  Location: [facility] OR;  Service: General;  Laterality: N/A;   • LEG SURGERY         Family History   Problem Relation Age of Onset   • Asthma Mother    • Hypertension Father    • Stroke Father        Social History     Socioeconomic History   • Marital status: Single   Tobacco Use   • Smoking status: Never Smoker   • Smokeless tobacco: Never Used   Vaping Use   • Vaping Use: Never used   Substance and Sexual Activity   • Alcohol use: No   • Drug use: No   • Sexual activity: Defer           Objective   Physical Exam  Vitals and nursing note reviewed.   Constitutional:       General: She is not in acute distress.     Appearance: Normal appearance. She is obese. She is not toxic-appearing or diaphoretic.      Comments: Chronically ill-appearing   HENT:      Head: Normocephalic and atraumatic.      Nose: Nose normal.   Eyes:      Extraocular Movements: Extraocular movements intact.   Cardiovascular:      Rate and Rhythm: Regular rhythm. Tachycardia present.   Pulmonary:      Effort: Pulmonary effort is normal.   Abdominal:      General: Abdomen is flat.      Palpations: Abdomen is soft.      Tenderness: There is no abdominal tenderness. There is no guarding.   Musculoskeletal:         General: Normal range of motion.      Cervical back: Normal range of motion.   Skin:     General: Skin is warm and dry.   Neurological:      General: No focal deficit present.      Mental Status: She is alert. Mental status is at baseline.   Psychiatric:         Mood and Affect: Mood normal.         Behavior: Behavior normal.         Procedures           ED Course  ED Course as of 05/02/22 0806   Sun May 01, 2022   1742 WBC(!): 17.21 [TM]   1742 Hemoglobin: 12.4 [TM]   1742 Hematocrit: 38.1 [TM]   1742 INR(!): 1.18 [TM]   1742 Protime(!): 15.4 [TM]   1809 Occult Blood, Other(!): Positive [TM]   1903 EKG interpreted by me reveals sinus  tachycardia with a rate of 106 bpm.  There are nonspecific ST and T wave changes.  This is an abnormal appearing EKG. [TB]   2035 Lactate: 1.3 [TM]      ED Course User Index  [TB] Giana Gilbert MD  [TM] Jerry Vargas PA-C                                                 [facility]  1909  No bed availability after speaking with  [facility], we are on a wait list.  No GI coverage here tonight.  We do have GI coverage in the morning.  We will plan to monitor if unable to transfer can observe in the ED throughout the night until we have GI coverage.    2001  Updated patient on no bed status at [facility] and no GI coverage until morning.  Offered to call  or [place] or other facility and she declined stating she was okay to be observed here in the ED until we have GI coverage in the morning.    0101  Patient has been doing well recently, no vomiting episodes, resting comfortably.  Vital signs of normalized.  Repeat H&H was stable.  We will continue to monitor throughout the night.  We will hold Eliquis.  We will have gastroenterology coverage in the morning and will plan for admission and/or GI consult with possible endoscopy.  Final diagnoses:   Coffee ground emesis   Gastrointestinal hemorrhage, unspecified gastrointestinal hemorrhage type       ED Disposition  ED Disposition     ED Disposition   Decision to Admit    Condition   --    Comment   --             No follow-up provider specified.       Medication List      No changes were made to your prescriptions during this visit.         Patient has remained stable.  I spoke with the GI physician on-call at our facility this morning and he agreed to consult on the patient and is planning on upper endoscopy, likely this morning.  I also spoke with the hospitalist and we will admit patient at this facility for further evaluation of her GI bleeding and GI specialist consultation.     Giana Gilbert MD  05/02/22 8384        Giana Gilbert MD  05/02/22 0806

## 2022-05-02 ENCOUNTER — ANESTHESIA EVENT (OUTPATIENT)
Dept: GASTROENTEROLOGY | Facility: HOSPITAL | Age: 62
End: 2022-05-02

## 2022-05-02 ENCOUNTER — ANESTHESIA (OUTPATIENT)
Dept: GASTROENTEROLOGY | Facility: HOSPITAL | Age: 62
End: 2022-05-02

## 2022-05-02 PROBLEM — K20.90 ESOPHAGITIS: Status: ACTIVE | Noted: 2022-05-02

## 2022-05-02 PROBLEM — N18.6 END STAGE RENAL DISEASE ON DIALYSIS: Status: ACTIVE | Noted: 2022-05-02

## 2022-05-02 PROBLEM — K92.0 COFFEE GROUND EMESIS: Status: ACTIVE | Noted: 2022-05-01

## 2022-05-02 PROBLEM — D63.8 ANEMIA, CHRONIC DISEASE: Status: ACTIVE | Noted: 2018-10-02

## 2022-05-02 PROBLEM — Z99.2 END STAGE RENAL DISEASE ON DIALYSIS: Status: ACTIVE | Noted: 2022-05-02

## 2022-05-02 LAB
ALBUMIN SERPL-MCNC: 3.5 G/DL (ref 3.5–5.2)
ALBUMIN/GLOB SERPL: 0.9 G/DL
ALP SERPL-CCNC: 182 U/L (ref 39–117)
ALT SERPL W P-5'-P-CCNC: 15 U/L (ref 1–33)
ANION GAP SERPL CALCULATED.3IONS-SCNC: 12.1 MMOL/L (ref 5–15)
ANISOCYTOSIS BLD QL: NORMAL
AST SERPL-CCNC: 19 U/L (ref 1–32)
BASOPHILS # BLD AUTO: 0.03 10*3/MM3 (ref 0–0.2)
BASOPHILS NFR BLD AUTO: 0.2 % (ref 0–1.5)
BILIRUB SERPL-MCNC: 0.4 MG/DL (ref 0–1.2)
BUN SERPL-MCNC: 53 MG/DL (ref 8–23)
BUN/CREAT SERPL: 11.5 (ref 7–25)
CALCIUM SPEC-SCNC: 10.1 MG/DL (ref 8.6–10.5)
CHLORIDE SERPL-SCNC: 91 MMOL/L (ref 98–107)
CO2 SERPL-SCNC: 29.9 MMOL/L (ref 22–29)
CREAT SERPL-MCNC: 4.59 MG/DL (ref 0.57–1)
DEPRECATED RDW RBC AUTO: 65.7 FL (ref 37–54)
EGFRCR SERPLBLD CKD-EPI 2021: 10.2 ML/MIN/1.73
EOSINOPHIL # BLD AUTO: 0.11 10*3/MM3 (ref 0–0.4)
EOSINOPHIL NFR BLD AUTO: 0.8 % (ref 0.3–6.2)
ERYTHROCYTE [DISTWIDTH] IN BLOOD BY AUTOMATED COUNT: 23.3 % (ref 12.3–15.4)
GLOBULIN UR ELPH-MCNC: 4 GM/DL
GLUCOSE BLDC GLUCOMTR-MCNC: 160 MG/DL (ref 70–130)
GLUCOSE BLDC GLUCOMTR-MCNC: 162 MG/DL (ref 70–130)
GLUCOSE BLDC GLUCOMTR-MCNC: 187 MG/DL (ref 70–130)
GLUCOSE BLDC GLUCOMTR-MCNC: 188 MG/DL (ref 70–130)
GLUCOSE SERPL-MCNC: 217 MG/DL (ref 65–99)
HCT VFR BLD AUTO: 37.9 % (ref 34–46.6)
HGB BLD-MCNC: 11.8 G/DL (ref 12–15.9)
IMM GRANULOCYTES # BLD AUTO: 0.06 10*3/MM3 (ref 0–0.05)
IMM GRANULOCYTES NFR BLD AUTO: 0.4 % (ref 0–0.5)
LYMPHOCYTES # BLD AUTO: 1.01 10*3/MM3 (ref 0.7–3.1)
LYMPHOCYTES NFR BLD AUTO: 7.5 % (ref 19.6–45.3)
MCH RBC QN AUTO: 24.6 PG (ref 26.6–33)
MCHC RBC AUTO-ENTMCNC: 31.1 G/DL (ref 31.5–35.7)
MCV RBC AUTO: 79 FL (ref 79–97)
MONOCYTES # BLD AUTO: 1.05 10*3/MM3 (ref 0.1–0.9)
MONOCYTES NFR BLD AUTO: 7.8 % (ref 5–12)
NEUTROPHILS NFR BLD AUTO: 11.15 10*3/MM3 (ref 1.7–7)
NEUTROPHILS NFR BLD AUTO: 83.3 % (ref 42.7–76)
NRBC BLD AUTO-RTO: 0 /100 WBC (ref 0–0.2)
PLATELET # BLD AUTO: 130 10*3/MM3 (ref 140–450)
PMV BLD AUTO: 11 FL (ref 6–12)
POTASSIUM SERPL-SCNC: 3.8 MMOL/L (ref 3.5–5.2)
PROT SERPL-MCNC: 7.5 G/DL (ref 6–8.5)
RBC # BLD AUTO: 4.8 10*6/MM3 (ref 3.77–5.28)
SARS-COV-2 RNA PNL SPEC NAA+PROBE: NOT DETECTED
SMALL PLATELETS BLD QL SMEAR: NORMAL
SODIUM SERPL-SCNC: 133 MMOL/L (ref 136–145)
WBC MORPH BLD: NORMAL
WBC NRBC COR # BLD: 13.41 10*3/MM3 (ref 3.4–10.8)

## 2022-05-02 PROCEDURE — 96366 THER/PROPH/DIAG IV INF ADDON: CPT

## 2022-05-02 PROCEDURE — 82962 GLUCOSE BLOOD TEST: CPT

## 2022-05-02 PROCEDURE — 99222 1ST HOSP IP/OBS MODERATE 55: CPT | Performed by: INTERNAL MEDICINE

## 2022-05-02 PROCEDURE — 99220 PR INITIAL OBSERVATION CARE/DAY 70 MINUTES: CPT | Performed by: INTERNAL MEDICINE

## 2022-05-02 PROCEDURE — 25010000002 PROPOFOL 200 MG/20ML EMULSION: Performed by: NURSE ANESTHETIST, CERTIFIED REGISTERED

## 2022-05-02 PROCEDURE — 87635 SARS-COV-2 COVID-19 AMP PRB: CPT | Performed by: PHYSICIAN ASSISTANT

## 2022-05-02 PROCEDURE — 85025 COMPLETE CBC W/AUTO DIFF WBC: CPT | Performed by: EMERGENCY MEDICINE

## 2022-05-02 PROCEDURE — 80053 COMPREHEN METABOLIC PANEL: CPT | Performed by: EMERGENCY MEDICINE

## 2022-05-02 PROCEDURE — 88305 TISSUE EXAM BY PATHOLOGIST: CPT | Performed by: INTERNAL MEDICINE

## 2022-05-02 PROCEDURE — 25010000002 METOCLOPRAMIDE PER 10 MG: Performed by: INTERNAL MEDICINE

## 2022-05-02 PROCEDURE — 43239 EGD BIOPSY SINGLE/MULTIPLE: CPT | Performed by: INTERNAL MEDICINE

## 2022-05-02 PROCEDURE — 85007 BL SMEAR W/DIFF WBC COUNT: CPT | Performed by: EMERGENCY MEDICINE

## 2022-05-02 RX ORDER — ACETAMINOPHEN 325 MG/1
650 TABLET ORAL EVERY 4 HOURS PRN
Status: DISCONTINUED | OUTPATIENT
Start: 2022-05-02 | End: 2022-05-04 | Stop reason: HOSPADM

## 2022-05-02 RX ORDER — PANTOPRAZOLE SODIUM 40 MG/10ML
40 INJECTION, POWDER, LYOPHILIZED, FOR SOLUTION INTRAVENOUS
Status: DISCONTINUED | OUTPATIENT
Start: 2022-05-02 | End: 2022-05-04 | Stop reason: HOSPADM

## 2022-05-02 RX ORDER — DOCUSATE SODIUM 100 MG/1
100 CAPSULE, LIQUID FILLED ORAL 2 TIMES DAILY
Status: DISCONTINUED | OUTPATIENT
Start: 2022-05-02 | End: 2022-05-04 | Stop reason: HOSPADM

## 2022-05-02 RX ORDER — METOPROLOL SUCCINATE 50 MG/1
50 TABLET, EXTENDED RELEASE ORAL DAILY
Status: DISCONTINUED | OUTPATIENT
Start: 2022-05-02 | End: 2022-05-04 | Stop reason: HOSPADM

## 2022-05-02 RX ORDER — SODIUM CHLORIDE 0.9 % (FLUSH) 0.9 %
3 SYRINGE (ML) INJECTION EVERY 12 HOURS SCHEDULED
Status: DISCONTINUED | OUTPATIENT
Start: 2022-05-02 | End: 2022-05-04 | Stop reason: HOSPADM

## 2022-05-02 RX ORDER — SODIUM CHLORIDE 0.9 % (FLUSH) 0.9 %
3-10 SYRINGE (ML) INJECTION AS NEEDED
Status: DISCONTINUED | OUTPATIENT
Start: 2022-05-02 | End: 2022-05-04 | Stop reason: HOSPADM

## 2022-05-02 RX ORDER — LIDOCAINE HYDROCHLORIDE 20 MG/ML
INJECTION, SOLUTION INTRAVENOUS AS NEEDED
Status: DISCONTINUED | OUTPATIENT
Start: 2022-05-02 | End: 2022-05-02 | Stop reason: SURG

## 2022-05-02 RX ORDER — SODIUM CHLORIDE 9 MG/ML
INJECTION, SOLUTION INTRAVENOUS CONTINUOUS PRN
Status: DISCONTINUED | OUTPATIENT
Start: 2022-05-02 | End: 2022-05-02 | Stop reason: SURG

## 2022-05-02 RX ORDER — LEVOTHYROXINE SODIUM 0.15 MG/1
150 TABLET ORAL DAILY
Status: DISCONTINUED | OUTPATIENT
Start: 2022-05-02 | End: 2022-05-04 | Stop reason: HOSPADM

## 2022-05-02 RX ORDER — LIDOCAINE 50 MG/G
1 PATCH TOPICAL EVERY 24 HOURS
Status: DISCONTINUED | OUTPATIENT
Start: 2022-05-02 | End: 2022-05-04 | Stop reason: HOSPADM

## 2022-05-02 RX ORDER — ACETAMINOPHEN 650 MG/1
650 SUPPOSITORY RECTAL EVERY 4 HOURS PRN
Status: DISCONTINUED | OUTPATIENT
Start: 2022-05-02 | End: 2022-05-04 | Stop reason: HOSPADM

## 2022-05-02 RX ORDER — FOLIC ACID/VIT B COMPLEX AND C 0.8 MG
1 TABLET ORAL DAILY
Status: DISCONTINUED | OUTPATIENT
Start: 2022-05-02 | End: 2022-05-04 | Stop reason: HOSPADM

## 2022-05-02 RX ORDER — ACETAMINOPHEN 160 MG/5ML
650 SOLUTION ORAL EVERY 4 HOURS PRN
Status: DISCONTINUED | OUTPATIENT
Start: 2022-05-02 | End: 2022-05-04 | Stop reason: HOSPADM

## 2022-05-02 RX ORDER — SODIUM CHLORIDE 9 MG/ML
70 INJECTION, SOLUTION INTRAVENOUS CONTINUOUS PRN
Status: DISCONTINUED | OUTPATIENT
Start: 2022-05-02 | End: 2022-05-04 | Stop reason: HOSPADM

## 2022-05-02 RX ORDER — METOCLOPRAMIDE HYDROCHLORIDE 5 MG/ML
5 INJECTION INTRAMUSCULAR; INTRAVENOUS EVERY 6 HOURS
Status: COMPLETED | OUTPATIENT
Start: 2022-05-02 | End: 2022-05-04

## 2022-05-02 RX ORDER — ONDANSETRON 2 MG/ML
4 INJECTION INTRAMUSCULAR; INTRAVENOUS EVERY 6 HOURS PRN
Status: DISCONTINUED | OUTPATIENT
Start: 2022-05-02 | End: 2022-05-04 | Stop reason: HOSPADM

## 2022-05-02 RX ORDER — ISOSORBIDE MONONITRATE 30 MG/1
30 TABLET, EXTENDED RELEASE ORAL DAILY
Status: DISCONTINUED | OUTPATIENT
Start: 2022-05-02 | End: 2022-05-04 | Stop reason: HOSPADM

## 2022-05-02 RX ORDER — PROPOFOL 10 MG/ML
INJECTION, EMULSION INTRAVENOUS AS NEEDED
Status: DISCONTINUED | OUTPATIENT
Start: 2022-05-02 | End: 2022-05-02 | Stop reason: SURG

## 2022-05-02 RX ORDER — ESCITALOPRAM OXALATE 10 MG/1
10 TABLET ORAL DAILY
Status: DISCONTINUED | OUTPATIENT
Start: 2022-05-02 | End: 2022-05-04 | Stop reason: HOSPADM

## 2022-05-02 RX ORDER — FERROUS SULFATE TAB EC 324 MG (65 MG FE EQUIVALENT) 324 (65 FE) MG
324 TABLET DELAYED RESPONSE ORAL 2 TIMES DAILY WITH MEALS
Status: DISCONTINUED | OUTPATIENT
Start: 2022-05-02 | End: 2022-05-02

## 2022-05-02 RX ORDER — SUCRALFATE 1 G/1
1 TABLET ORAL
Status: DISCONTINUED | OUTPATIENT
Start: 2022-05-02 | End: 2022-05-04 | Stop reason: HOSPADM

## 2022-05-02 RX ADMIN — Medication 1 TABLET: at 17:41

## 2022-05-02 RX ADMIN — METOCLOPRAMIDE 5 MG: 5 INJECTION, SOLUTION INTRAMUSCULAR; INTRAVENOUS at 17:43

## 2022-05-02 RX ADMIN — ESCITALOPRAM OXALATE 10 MG: 10 TABLET ORAL at 17:42

## 2022-05-02 RX ADMIN — PANTOPRAZOLE SODIUM 8 MG/HR: 40 INJECTION, POWDER, FOR SOLUTION INTRAVENOUS at 06:50

## 2022-05-02 RX ADMIN — PROPOFOL 20 MG: 10 INJECTION, EMULSION INTRAVENOUS at 11:43

## 2022-05-02 RX ADMIN — LIDOCAINE 1 PATCH: 50 PATCH CUTANEOUS at 17:44

## 2022-05-02 RX ADMIN — PANTOPRAZOLE SODIUM 40 MG: 40 INJECTION, POWDER, FOR SOLUTION INTRAVENOUS at 17:43

## 2022-05-02 RX ADMIN — METOPROLOL SUCCINATE 50 MG: 50 TABLET, EXTENDED RELEASE ORAL at 17:41

## 2022-05-02 RX ADMIN — LIDOCAINE HYDROCHLORIDE 40 MG: 20 INJECTION, SOLUTION INTRAVENOUS at 11:40

## 2022-05-02 RX ADMIN — PROPOFOL 20 MG: 10 INJECTION, EMULSION INTRAVENOUS at 11:46

## 2022-05-02 RX ADMIN — SUCRALFATE 1 G: 1 TABLET ORAL at 17:41

## 2022-05-02 RX ADMIN — PANTOPRAZOLE SODIUM 8 MG/HR: 40 INJECTION, POWDER, FOR SOLUTION INTRAVENOUS at 01:43

## 2022-05-02 RX ADMIN — ISOSORBIDE MONONITRATE 30 MG: 30 TABLET, EXTENDED RELEASE ORAL at 17:40

## 2022-05-02 RX ADMIN — PROPOFOL 20 MG: 10 INJECTION, EMULSION INTRAVENOUS at 11:40

## 2022-05-02 RX ADMIN — METOCLOPRAMIDE 5 MG: 5 INJECTION, SOLUTION INTRAMUSCULAR; INTRAVENOUS at 21:59

## 2022-05-02 RX ADMIN — SODIUM CHLORIDE: 9 INJECTION, SOLUTION INTRAVENOUS at 11:39

## 2022-05-02 RX ADMIN — Medication 3 ML: at 21:59

## 2022-05-02 RX ADMIN — SUCRALFATE 1 G: 1 TABLET ORAL at 21:59

## 2022-05-02 RX ADMIN — LEVOTHYROXINE SODIUM 150 MCG: 150 TABLET ORAL at 17:43

## 2022-05-02 NOTE — H&P
[facility]   HISTORY AND PHYSICAL      Name:  Millicent Mckeon   Age:  61 y.o.  Sex:  female  :  1960  MRN:  [id number]   Visit Number:  [id number]  Admission Date:  2022  Date Of Service:  22  Primary Care Physician:  Marianela Albright APRN    Chief Complaint:     Hematemesis.    History Of Presenting Illness:      Ms. Mckeon is a 61 year old chronically ill lady with history of atrial fibrillation, chronic systolic heart failure, end-stage renal disease on dialysis (MWF), chronic cor pulmonale, morbid obesity, functional quadriplegia who is wheelchair-bound was brought to the emergency room by EMS with symptoms of coffee-ground emesis.  Patient was seen and examined in the emergency room and the history is obtained from the patient as well as the medical chart.  Patient states that she started having vomiting on Friday following her dialysis (today being Monday).  She subsequently developed coffee-ground emesis yesterday along with some abdominal pain.  Her brother Ted came and checked on her and subsequently called ambulance to bring her to the emergency room.    In the emergency room, she was afebrile but tachycardic at 107 with a blood pressure of 173/110.  Initial pulse oxygen saturation was 99% on room air but she subsequently dropped her saturations into the mid 80s requiring liters of nasal cannula oxygen.  Blood work done in the emergency room was remarkable for a glucose of 217, sodium 133, BUN 53, creatinine 4.59, WBC 13.4, hemoglobin 11.8 and platelet count 130.  Patient was given IV Protonix, insulin and antiemetics in the emergency room.  Her case was discussed with Dr. Chance from gastroenterology and the patient is subsequently being admitted to the medical floor with telemetry for further evaluation and management.      Review Of Systems:    All systems were reviewed and negative except as mentioned in history of presenting illness, assessment and  plan.    Past Medical History: Patient  has a past medical history of A-fib (HCC), Anemia (10/2/2018), Diabetes mellitus (HCC), Disease of thyroid gland, GERD (gastroesophageal reflux disease), Gout, History of transfusion, and Hypertension.    Past Surgical History: Patient  has a past surgical history that includes Eye surgery; Leg Surgery; incision and drainage leg (Right, 1/14/2019); and Hemodialysis Catheter (N/A, 4/5/2022).    Social History: Patient  reports that she has never smoked. She has never used smokeless tobacco. She reports that she does not drink alcohol and does not use drugs.    Family History: Patient's family history includes Asthma in her mother; Hypertension in her father; Stroke in her father.    Allergies:      Metformin and related and Metformin    Home Medications:    Prior to Admission Medications     Prescriptions Last Dose Informant Patient Reported? Taking?    apixaban (ELIQUIS) 5 MG tablet tablet   No No    Take 1 tablet by mouth Every 12 (Twelve) Hours.    aspirin 81 MG chewable tablet   Yes No    Chew 81 mg Daily.    b complex-vitamin c-folic acid (NEPHRO-LOIS) 0.8 MG tablet tablet   No No    Take 1 tablet by mouth Daily    docusate sodium (COLACE) 100 MG capsule   Yes No    Take 100 mg by mouth 2 (Two) Times a Day.    Elastic Bandages & Supports (JOBST OPAQUE KNEE 20-30MMHG XL) misc   No No    1 application Daily.    escitalopram (LEXAPRO) 10 MG tablet   No No    Take 1 tablet by mouth Daily    ferrous sulfate 324 (65 Fe) MG tablet delayed-release EC tablet   Yes No    Take 324 mg by mouth 2 (Two) Times a Day With Meals.    insulin detemir (LEVEMIR) 100 UNIT/ML injection   No No    Inject 10 Units under the skin into the appropriate area as directed Daily    isosorbide mononitrate (IMDUR) 30 MG 24 hr tablet   Yes No    Take 30 mg by mouth Daily.    lactobacillus acidophilus (RISAQUAD) capsule capsule   No No    Take 1 capsule by mouth 2 (Two) Times a Day    levothyroxine  (SYNTHROID, LEVOTHROID) 150 MCG tablet   No No    Take 1 tablet by mouth Daily    levothyroxine (SYNTHROID, LEVOTHROID) 150 MCG tablet   No No    Take 1 tablet by mouth Daily for 30 days.    lidocaine (LIDODERM) 5 %   Yes No    Place 1 patch on the skin as directed by provider Daily. Apply patch to left knee daily, on at 0700 am off at 2000    metoprolol succinate XL (TOPROL-XL) 100 MG 24 hr tablet   No No    Take 1 tablet by mouth Daily    metoprolol succinate XL (TOPROL-XL) 50 MG 24 hr tablet   No No    Take 1 tablet by mouth Daily    pantoprazole (PROTONIX) 40 MG EC tablet   Yes No    Take 40 mg by mouth Daily.    QUEtiapine (SEROquel) 25 MG tablet   No No    Take 1/2 tablet by mouth Every Night    torsemide (DEMADEX) 100 MG tablet   No No    Take 1 tablet by mouth Daily        ED Medications:    Medications   sodium chloride 0.9 % flush 10 mL (has no administration in time range)   sodium chloride 0.9 % flush 10 mL (has no administration in time range)   pantoprazole (PROTONIX) 40 mg in 100mL NS IVPB (8 mg/hr Intravenous Currently Infusing 5/2/22 0907)   insulin detemir (LEVEMIR) injection 25 Units (25 Units Subcutaneous Given 5/1/22 2146)   pantoprazole (PROTONIX) injection 80 mg (80 mg Intravenous Given 5/1/22 1727)   ondansetron (ZOFRAN) injection 4 mg (4 mg Intravenous Given 5/1/22 1753)   metoclopramide (REGLAN) injection 10 mg (10 mg Intravenous Given 5/1/22 2010)   diphenhydrAMINE (BENADRYL) injection 25 mg (25 mg Intravenous Given 5/1/22 2011)   droperidol (INAPSINE) injection 2.5 mg (2.5 mg Intravenous Given 5/1/22 2053)     Vital Signs:  Temp:  [99.3 °F (37.4 °C)] 99.3 °F (37.4 °C)  Heart Rate:  [] 92  Resp:  [17-24] 17  BP: (119-173)/() 119/85    There were no vitals filed for this visit.  There is no height or weight on file to calculate BMI.    Physical Exam:     Most recent vital Signs: /85   Pulse 92   Temp 99.3 °F (37.4 °C)   Resp 17   SpO2 96%     Physical  Exam  Constitutional:       General: She is not in acute distress.     Appearance: She is obese. She is ill-appearing.   HENT:      Head: Normocephalic and atraumatic.      Right Ear: External ear normal.      Left Ear: External ear normal.      Nose: Nose normal.      Mouth/Throat:      Mouth: Mucous membranes are moist.   Eyes:      Extraocular Movements: Extraocular movements intact.      Conjunctiva/sclera: Conjunctivae normal.   Cardiovascular:      Rate and Rhythm: Rhythm irregular.      Pulses: Normal pulses.      Heart sounds: Normal heart sounds. No murmur heard.  Pulmonary:      Effort: Pulmonary effort is normal.      Breath sounds: Normal breath sounds. No wheezing or rales.   Abdominal:      General: Bowel sounds are normal. There is distension.      Palpations: Abdomen is soft.      Tenderness: There is no abdominal tenderness. There is no guarding or rebound.      Comments: Obese abdomen with a large pannus.   Musculoskeletal:         General: No tenderness.      Cervical back: Neck supple. No rigidity.      Right lower leg: Edema present.      Left lower leg: Edema present.   Skin:     General: Skin is warm.      Comments: Chronic venous changes noted in the lower extremities.   Neurological:      General: No focal deficit present.      Mental Status: She is alert and oriented to person, place, and time. Mental status is at baseline.   Psychiatric:         Mood and Affect: Mood normal.         Behavior: Behavior normal.       Laboratory data:    I have reviewed the labs done in the emergency room.    Results from last 7 days   Lab Units 05/01/22  1709   SODIUM mmol/L 128*   POTASSIUM mmol/L 3.9   CHLORIDE mmol/L 85*   CO2 mmol/L 23.1   BUN mg/dL 45*   CREATININE mg/dL 4.28*   CALCIUM mg/dL 11.0*   BILIRUBIN mg/dL 0.8   ALK PHOS U/L 213*   ALT (SGPT) U/L 21   AST (SGOT) U/L 27   GLUCOSE mg/dL 417*     Results from last 7 days   Lab Units 05/01/22  2232 05/01/22  1709   WBC 10*3/mm3  --  17.21*    HEMOGLOBIN g/dL 12.1 12.4   HEMATOCRIT % 38.2 38.1   PLATELETS 10*3/mm3  --  130*     Results from last 7 days   Lab Units 05/01/22  1709   INR  1.18*       Results from last 7 days   Lab Units 05/01/22  1709   LIPASE U/L 21     EKG:      EKG done in the emergency room was reviewed by me.  It shows sinus tachycardia at 106 bpm.  Normal axis.  No significant ST-T changes were noted.    Radiology:    CT Abdomen Pelvis Without Contrast    Result Date: 5/1/2022  FINAL REPORT TECHNIQUE: Axial CT images were performed from the lung bases through the symphysis pubis without IV contrast.  This study was performed with techniques to keep radiation doses as low as reasonably achievable (ALARA). Individualized dose reduction techniques using automated exposure control or adjustment of mA and/or kV according to the patient's size were employed. CLINICAL HISTORY: abd pain, hematemesis FINDINGS: Lack of intravenous contrast limits evaluation of solid abdominal and pelvic organs.  LOWER CHEST: There is cardiomegaly and coronary artery disease.  The lung bases are clear. ABDOMEN/PELVIS:  Liver, gallbladder and bile ducts: The unenhanced liver is grossly unremarkable without focal abnormality.  The gallbladder is unremarkable.  There is no definite biliary duct dilatation.  Adrenal glands: The adrenal glands are morphologically unremarkable without suspicious lesion.  Kidneys, ureter and urinary bladder: No nephrolithiasis.  No hydronephrosis.  Urinary bladder is unremarkable.  Spleen: The spleen is normal size.  Pancreas: The pancreas is atrophied.  GI systems and mesentery: There is a small hiatal hernia.  No evidence of bowel obstruction.  The appendix is not visualized.    No significant mesenteric inflammation.  Lymph nodes: No definite pathologically enlarged abdominal or pelvic lymph nodes present within the limits of this noncontrast enhanced exam.  Vessels: The abdominal aorta is normal in caliber.  The inferior vena cava  is unremarkable. Peritoneum: No free intraperitoneal fluid or pneumoperitoneum. Pelvic viscera: No acute findings.  Body wall: There is nonspecific subcutaneous inflammation of the abdominal pannus. Cellulitis is not excluded.  Bones: No acute fracture.     No acute findings in the abdomen and pelvis to account for patient's symptoms.   Nonspecific edema of the abdominal pannus, cellulitis is possible. Authenticated by Jonathan Garay MD on 05/01/2022 06:07:48 PM    Assessment:    1. Acute upper GI bleed likely secondary to peptic ulcer disease, POA  2. End-stage renal disease on hemodialysis (MWF).  3. Chronic systolic heart failure with ejection fraction of 35%.  4. Paroxysmal atrial fibrillation currently off apixaban due to GI bleed.  5. Chronic cor pulmonale.  6. Chronic hypoxic respiratory failure on home oxygen.  7. Diabetes mellitus type 2 with nephropathy.  8. Chronic venous insufficiency of the lower extremities.  9. Morbid obesity with a BMI of 44.  10. Anemia of chronic kidney disease.  11. Functional quadriplegia.  12. Acquired hypothyroidism.    Plan:    Upper GI bleed/peptic ulcer disease.  - Patient is currently hemodynamically stable and did undergo upper GI endoscopy.  - She was noted to have esophageal ulcers and esophagitis without any active bleeding.  - Dr. Chance felt that the patient may have underlying gastroparesis from diabetes and has recommended metoclopramide.  - Hold aspirin and Eliquis for 1 week and may restart Eliquis.  - Continue to monitor H&H and transfuse as necessary.    End-stage renal disease.  - I have discussed the patient's condition with Dr. Cutler and he will be consulted.  - At this time there is no indication for emergent dialysis and she will be dialyzed tomorrow.    Diabetes mellitus type 2/hypertension.  -Continue home medications.  -Continue Levemir 10 units daily.  -She will be placed on subcutaneous insulin protocol for coverage.  -Hemoglobin A1c 8.5 on  3/4/2022.    We will consult physical and occupational therapy.  Hopefully, she should be able to go home tomorrow with home health services.    I have discussed the patient's advanced directives with her and she wants to be DNR.    Risk Assessment: Moderate  DVT Prophylaxis: SCDs  Code Status: DNR/DNI  Diet: Renal diabetic.    Advance Care Planning      ACP discussion was held with the patient during this visit. Patient does not have an advance directive, information provided.           Samson Reyes MD  05/02/22  09:23 EDT    Dictated utilizing Dragon dictation.

## 2022-05-02 NOTE — CONSULTS
In Patient Consult      Date of Consultation: May 2, 2022  Patient Name: Millicetn Mckeon  MRN: [id number]  : 1960     Referring provider: Giana Gilbert, *    Primary care provider:  Marianela Albright APRN    Reason for consultation: Nausea vomiting and coffee-ground emesis    History of Present Illness: Mrs. 61-year-old female patient with a significant medical comorbidities including end-stage renal disease on hemodialysis, chronic atrial fibrillation, hypertension, chronic systolic heart failure, cor pulmonale, obesity, diabetes mellitus, functional quadriplegia, recent history of bilateral pneumonia, chronic anemia was seen in emergency room on 2022 with complaints of persistent nausea vomiting and coffee-ground material in the vomitus since dialysis on Friday.    Patient was recently discharged from the hospital in 2022 after she was admitted for a pneumonia and multiple skin wounds.  As per record she was discharged to palliative care.  She was initiated with hemodialysis in her last admission and was getting hemodialysis on  and Friday.  As per patient she has been having recurrent multiple episodes of nausea vomiting since Friday.  She noted flecks of blood blood in the brown and minimal coffee-ground material intermittently.  She also has a associated diffuse abdominal pain.  No fever chills.  Prior history of any upper GI bleed.  No diarrhea or blood in the stool    Patient was supposed to be on aspirin and the Eliquis.  Not sure when she had her last dose.  No record of any recent EGD or colonoscopy.    She was evaluated emergency room and had a CT abdomen pelvis done which did not reveal any acute abdominal pathology except possible abdominal wall edema, cellulitis.  Her hemoglobin was stable at 12 g/dL.  Her platelets were low at 130,000.  Her glucose was 217.  BUN was 53 creatinine 4.5.  Liver enzymes were normal except elevated alkaline phosphatase 182.  She  had a borderline WBC is 13,000.  Her PT/INR was 1.18        Subjective     Past Medical History:   Diagnosis Date   • A-fib (HCC)    • Anemia 10/2/2018   • Diabetes mellitus (HCC)    • Disease of thyroid gland    • GERD (gastroesophageal reflux disease)    • Gout    • History of transfusion    • Hypertension        Past Surgical History:   Procedure Laterality Date   • EYE SURGERY     • INCISION AND DRAINAGE LEG Right 1/14/2019    Procedure: Right heel incision and drainage with graft application;  Surgeon: Ar Rueda DPM;  Location: [facility];  Service: Podiatry   • INSERTION HEMODIALYSIS CATHETER N/A 4/5/2022    Procedure: HEMODIALYSIS CATHETER INSERTION;  Surgeon: Jean Carlos Guadalupe MD;  Location: [facility];  Service: General;  Laterality: N/A;   • LEG SURGERY         Family History   Problem Relation Age of Onset   • Asthma Mother    • Hypertension Father    • Stroke Father        Social History     Socioeconomic History   • Marital status: Single   Tobacco Use   • Smoking status: Never Smoker   • Smokeless tobacco: Never Used   Vaping Use   • Vaping Use: Never used   Substance and Sexual Activity   • Alcohol use: No   • Drug use: No   • Sexual activity: Defer         Current Facility-Administered Medications:   •  insulin detemir (LEVEMIR) injection 25 Units, 25 Units, Subcutaneous, Nightly, Meccariello, Jerry Vic, PA-C, 25 Units at 05/01/22 2146  •  pantoprazole (PROTONIX) 40 mg in 100mL NS IVPB, 8 mg/hr, Intravenous, Continuous, Meccariello, Jerry Vic, PA-C, Last Rate: 20 mL/hr at 05/02/22 0650, 8 mg/hr at 05/02/22 0650  •  sodium chloride 0.9 % flush 10 mL, 10 mL, Intravenous, PRN, Emergency, Triage Protocol, MD  •  [COMPLETED] Insert peripheral IV, , , Once **AND** sodium chloride 0.9 % flush 10 mL, 10 mL, Intravenous, PRN, Meccariello, Jerry Vic, PA-C    Current Outpatient Medications:   •  apixaban (ELIQUIS) 5 MG tablet tablet, Take 1 tablet by mouth Every 12 (Twelve) Hours., Disp: 60  tablet, Rfl: 11  •  aspirin 81 MG chewable tablet, Chew 81 mg Daily., Disp: , Rfl: 0  •  b complex-vitamin c-folic acid (NEPHRO-LOIS) 0.8 MG tablet tablet, Take 1 tablet by mouth Daily, Disp: 30 tablet, Rfl: 12  •  docusate sodium (COLACE) 100 MG capsule, Take 100 mg by mouth 2 (Two) Times a Day., Disp: , Rfl:   •  Elastic Bandages & Supports (JOBST OPAQUE KNEE 20-30MMHG XL) misc, 1 application Daily., Disp: 1 each, Rfl: 1  •  escitalopram (LEXAPRO) 10 MG tablet, Take 1 tablet by mouth Daily, Disp: 30 tablet, Rfl: 12  •  ferrous sulfate 324 (65 Fe) MG tablet delayed-release EC tablet, Take 324 mg by mouth 2 (Two) Times a Day With Meals., Disp: , Rfl:   •  insulin detemir (LEVEMIR) 100 UNIT/ML injection, Inject 10 Units under the skin into the appropriate area as directed Daily, Disp: 10 mL, Rfl: 0  •  isosorbide mononitrate (IMDUR) 30 MG 24 hr tablet, Take 30 mg by mouth Daily., Disp: , Rfl:   •  lactobacillus acidophilus (RISAQUAD) capsule capsule, Take 1 capsule by mouth 2 (Two) Times a Day, Disp: 60 capsule, Rfl: 0  •  levothyroxine (SYNTHROID, LEVOTHROID) 150 MCG tablet, Take 1 tablet by mouth Daily, Disp: 30 tablet, Rfl: 1  •  levothyroxine (SYNTHROID, LEVOTHROID) 150 MCG tablet, Take 1 tablet by mouth Daily for 30 days., Disp: 30 tablet, Rfl: 0  •  lidocaine (LIDODERM) 5 %, Place 1 patch on the skin as directed by provider Daily. Apply patch to left knee daily, on at 0700 am off at 2000, Disp: , Rfl:   •  metoprolol succinate XL (TOPROL-XL) 100 MG 24 hr tablet, Take 1 tablet by mouth Daily, Disp: 30 tablet, Rfl: 1  •  metoprolol succinate XL (TOPROL-XL) 50 MG 24 hr tablet, Take 1 tablet by mouth Daily, Disp: 30 tablet, Rfl: 0  •  pantoprazole (PROTONIX) 40 MG EC tablet, Take 40 mg by mouth Daily., Disp: , Rfl:   •  QUEtiapine (SEROquel) 25 MG tablet, Take 1/2 tablet by mouth Every Night, Disp: 15 tablet, Rfl: 1  •  torsemide (DEMADEX) 100 MG tablet, Take 1 tablet by mouth Daily, Disp: 30 tablet, Rfl:  1    Allergies   Allergen Reactions   • Metformin And Related Anaphylaxis     HA, diarrhea, throat swelling   • Metformin GI Intolerance       Review of Systems   Constitutional: Positive for fatigue. Negative for fever.   HENT: Negative for sore throat and trouble swallowing.    Eyes: Negative for visual disturbance.   Respiratory: Negative for cough, chest tightness and shortness of breath.    Cardiovascular: Positive for leg swelling. Negative for chest pain and palpitations.   Gastrointestinal: Positive for abdominal pain, nausea and vomiting. Negative for blood in stool, constipation, diarrhea and indigestion.   Endocrine: Negative for polyphagia.   Genitourinary: Negative for dysuria and hematuria.   Musculoskeletal: Negative for back pain, joint swelling and neck pain.   Skin: Positive for rash and wound.   Neurological: Negative for dizziness, seizures, speech difficulty, weakness (Generalized weakness), numbness and confusion.   Hematological: Negative for adenopathy. Bruises/bleeds easily.   Psychiatric/Behavioral: Negative for hallucinations and depressed mood.        The following portions of the patient's history were reviewed and updated as appropriate: allergies, current medications, past family history, past medical history, past social history, past surgical history and problem list.    Objective     Vitals:    05/02/22 0500 05/02/22 0600 05/02/22 0700 05/02/22 0702   BP: 133/86 137/76 129/76    Patient Position:       Pulse: 92 92 95    Resp:       Temp:       SpO2: 100% 100%  92%       Physical Exam  Vitals and nursing note reviewed.   Constitutional:       Appearance: She is well-developed. She is obese.   HENT:      Head: Normocephalic and atraumatic.      Right Ear: External ear normal.      Left Ear: External ear normal.   Eyes:      Comments: Mild pallor present   Neck:      Thyroid: No thyromegaly.      Trachea: No tracheal deviation.   Cardiovascular:      Rate and Rhythm: Normal rate and  regular rhythm.      Heart sounds: No murmur heard.  Pulmonary:      Effort: Pulmonary effort is normal. No respiratory distress.      Breath sounds: Normal breath sounds.      Comments: Right-sided chest with hemodialysis tunneled catheter noted  Abdominal:      General: Bowel sounds are normal. There is no distension.      Palpations: Abdomen is soft. There is no mass.      Tenderness: There is no abdominal tenderness. There is no guarding or rebound.      Hernia: No hernia is present.      Comments: Minimal abdominal wall edema noted   Musculoskeletal:         General: Normal range of motion.      Cervical back: Normal range of motion.      Right lower leg: Edema (Minimal) present.      Left lower leg: Edema (Minimal) present.   Skin:     General: Skin is warm and dry.      Comments: Multiple skin ulcerations with a scab noted in the lower extremity on both sides   Neurological:      Mental Status: She is alert and oriented to person, place, and time.   Psychiatric:         Mood and Affect: Mood normal.         Behavior: Behavior normal.         Results from last 7 days   Lab Units 05/01/22  2232 05/01/22  1709   SODIUM mmol/L  --  128*   POTASSIUM mmol/L  --  3.9   CHLORIDE mmol/L  --  85*   CO2 mmol/L  --  23.1   BUN mg/dL  --  45*   CREATININE mg/dL  --  4.28*   CALCIUM mg/dL  --  11.0*   ALBUMIN g/dL  --  4.10   BILIRUBIN mg/dL  --  0.8   ALK PHOS U/L  --  213*   ALT (SGPT) U/L  --  21   AST (SGOT) U/L  --  27   GLUCOSE mg/dL  --  417*   WBC 10*3/mm3  --  17.21*   HEMOGLOBIN g/dL 12.1 12.4   PLATELETS 10*3/mm3  --  130*   INR   --  1.18*       Imaging Results (Last 24 Hours)     Procedure Component Value Units Date/Time    CT Abdomen Pelvis Without Contrast [[id number]] Collected: 05/01/22 1807     Updated: 05/01/22 1911    Narrative:      FINAL REPORT    TECHNIQUE:  Axial CT images were performed from the lung bases through the  symphysis pubis without IV contrast.  This study was performed  with techniques  to keep radiation doses as low as reasonably  achievable (ALARA). Individualized dose reduction techniques  using automated exposure control or adjustment of mA and/or kV  according to the patient's size were employed.    CLINICAL HISTORY:  abd pain, hematemesis    FINDINGS:  Lack of intravenous contrast limits evaluation of solid  abdominal and pelvic organs.  LOWER CHEST: There is cardiomegaly  and coronary artery disease.  The lung bases are clear.  ABDOMEN/PELVIS:  Liver, gallbladder and bile ducts: The  unenhanced liver is grossly unremarkable without focal  abnormality.  The gallbladder is unremarkable.  There is no  definite biliary duct dilatation.  Adrenal glands: The adrenal  glands are morphologically unremarkable without suspicious  lesion.  Kidneys, ureter and urinary bladder: No  nephrolithiasis.  No hydronephrosis.  Urinary bladder is  unremarkable.  Spleen: The spleen is normal size.  Pancreas: The  pancreas is atrophied.  GI systems and mesentery: There is a  small hiatal hernia.  No evidence of bowel obstruction.  The  appendix is not visualized.    No significant mesenteric  inflammation.  Lymph nodes: No definite pathologically enlarged  abdominal or pelvic lymph nodes present within the limits of  this noncontrast enhanced exam.  Vessels: The abdominal aorta is  normal in caliber.  The inferior vena cava is unremarkable.  Peritoneum: No free intraperitoneal fluid or pneumoperitoneum.  Pelvic viscera: No acute findings.  Body wall: There is  nonspecific subcutaneous inflammation of the abdominal pannus.  Cellulitis is not excluded.  Bones: No acute fracture.      Impression:      No acute findings in the abdomen and pelvis to account for  patient's symptoms.   Nonspecific edema of the abdominal pannus,  cellulitis is possible.    Authenticated by Jonathan Garay MD on 05/01/2022 06:07:48 PM          Assessment / Plan      Assessment/Recommendations:   1.  Nausea vomiting  2.  Coffee-ground  emesis  3.  Suspected diabetic gastroparesis  4.  Acute on chronic normocytic anemia  5.  Long-term anticoagulation  Patient history is more suggestive of gastroparesis.  She likely had a bile reflux with the gastric erosions and minimal mucosal bleeding with coffee-ground material with the vomitus.  Patient is currently on aspirin and the Plavix and last dose was on Friday 3 days ago as per record.  Given her persistent symptoms she needs an EGD to evaluate further.  Patient currently already awake and oriented.  Risk and benefits  discussed with the patient, and agreeable to proceed with procedure.  Keep n.p.o.  Protonix IV twice daily  Correction of hyperglycemia  Hold aspirin and Eliquis  Schedule for an urgent EGD this noon  Monitor H&H and transfuse to keep the Hgb more than 7 g/dL    6.  End-stage renal disease on hemodialysis  7.  Congestive heart failure/cor pulmonale  8.  Chronic atrial fibrillation  9.  Multiple skin wounds  Patient known to have a CKD and had a recent initiation of hemodialysis in April 2022 after tunnel catheter placement.       Thank you very much for letting me participate in the care of this patient.  Please do not hesitate to call me if you have any questions.    Jacob Chance MD  [facility]  5/2/2022  07:42 EDT    Please note that portions of this note may have been completed with a voice recognition program.

## 2022-05-02 NOTE — ANESTHESIA PREPROCEDURE EVALUATION
Anesthesia Evaluation     Patient summary reviewed and Nursing notes reviewed   no history of anesthetic complications:  NPO Solid Status: > 8 hours  NPO Liquid Status: > 8 hours           Airway   Mallampati: II  TM distance: >3 FB  Neck ROM: full  Possible difficult intubation, Difficult intubation highly probable and Large neck circumference  Dental - normal exam   (+) edentulous    Pulmonary    (+) sleep apnea, decreased breath sounds,   (-) not a smoker    ROS comment: Pulmonary HTN   Cor pulmonale, chronic   Cardiovascular   Exercise tolerance: poor (<4 METS)    ECG reviewed  PT is on anticoagulation therapy  Patient on routine beta blocker and Beta blocker given within 24 hours of surgery  Rhythm: irregular  Rate: abnormal    (+) hypertension poorly controlled 2 medications or greater, dysrhythmias (Tachycardia-bradycardia syndrome) Atrial Fib, Paroxysmal Atrial Fib, CHF Diastolic >=55%, PVD,     ROS comment: EKG: Rhythm: atrial flutter     ECHO: 3/4/22  · The left ventricular cavity is mildly dilated.  · Left ventricular wall thickness is consistent with mild concentric hypertrophy.  · Estimated left ventricular EF = 48% Left ventricular ejection fraction appears to be 46 - 50%. Left ventricular systolic function is low normal.  · Left ventricular diastolic function is consistent with (grade II w/high LAP) pseudonormalization.  · The right atrial cavity is mildly dilated.  · The right ventricular cavity is mildly dilated.  · There is moderate calcification of the aortic valve mainly affecting the non-coronary, left coronary and right coronary cusp(s).  · Moderate to severe tricuspid valve regurgitation is present.  · Estimated right ventricular systolic pressure from tricuspid regurgitation is markedly elevated (>55 mmHg). Calculated right ventricular systolic pressure from tricuspid regurgitation is 63 mmHg.  · Severe pulmonary hypertension is present.        Neuro/Psych- negative ROS   GI/Hepatic/Renal/Endo    (+) obesity, morbid obesity, GERD poorly controlled, GI bleeding , renal disease ARF and CRI, diabetes mellitus type 2 poorly controlled using insulin, thyroid problem hypothyroidism    Musculoskeletal     (+) arthralgias, chronic pain, gait problem, myalgias,   Abdominal   (+) obese,    Substance History      OB/GYN          Other   arthritis (gout ), blood dyscrasia anemia thrombocytopenia,     ROS/Med Hx Other: Labs reviewe  12/38 gluc 406    Cellulitis bilateral lower extremities  Impaired functional mobility and activity tolerance  functional quadriplegia                        Anesthesia Plan    ASA 4 - emergent     MAC   (Risks and benefits discussed including risk of aspiration, recall and dental damage. All patient questions answered. Will continue with POC.)  intravenous induction     Anesthetic plan, all risks, benefits, and alternatives have been provided, discussed and informed consent has been obtained with: patient.        CODE STATUS:

## 2022-05-02 NOTE — CONSULTS
[facility]      Nephrology Consultation      Referring Provider:   No ref. provider found    Reason for Consultation:  ESRD and associated problems.       Subjective:  Chief complaint   Chief Complaint   Patient presents with   • Vomiting Blood     History of present illness:    Patient is 61-year-old  female with multiple medical problems including end-stage renal disease dialysis dependent at [facility] 3 times a week on Monday Wednesday Friday.  She has been complaining of nausea and vomiting since Friday after dialysis, she said there was some blood in the vomitus.  Her other medical conditions include chronic atrial fib with Eliquis and aspirin for anticoagulation, longstanding history of type 2 diabetes, hypertension, recurrent chronic systolic congestive heart failure, cor pulmonale, morbid obesity leading to functional quadriplegia.  She was recently discharged after starting hemodialysis at that time she was noted to have pneumonia as well as multiple skin lesions that have gradually improved.  She missed her dialysis because of the above-mentioned issues, she was seen and evaluated in the emergency room and was taken to the OR for EGD.  She denies having any fever or chills, no chest pain or shortness of breath, denies any significant abdominal pain and CT of the abdomen done in the emergency room was reviewed with no significant finding.  I have reviewed labs/imaging/records from this hospitalization, including ER staff and admitting/attending and consulting physicians H/P's and progress notes to establish a comprehensive understanding of this patient's clinical hospital course, as well as to establish plan of care appropriately.   Past Medical History:   Diagnosis Date   • A-fib (HCC)    • Anemia 10/2/2018   • Diabetes mellitus (HCC)    • Disease of thyroid gland    • GERD (gastroesophageal reflux disease)    • Gout    • History of transfusion    • Hypertension        Past  Surgical History:   Procedure Laterality Date   • EYE SURGERY     • INCISION AND DRAINAGE LEG Right 1/14/2019    Procedure: Right heel incision and drainage with graft application;  Surgeon: Ar Rueda DPM;  Location: [facility];  Service: Podiatry   • INSERTION HEMODIALYSIS CATHETER N/A 4/5/2022    Procedure: HEMODIALYSIS CATHETER INSERTION;  Surgeon: Jean Carlos Guadalupe MD;  Location: [facility];  Service: General;  Laterality: N/A;   • LEG SURGERY       Family History   Problem Relation Age of Onset   • Asthma Mother    • Hypertension Father    • Stroke Father      positive h/o ESRD her sister was on dialysis.    Social History     Tobacco Use   • Smoking status: Never Smoker   • Smokeless tobacco: Never Used   Vaping Use   • Vaping Use: Never used   Substance Use Topics   • Alcohol use: No   • Drug use: No     Home medications:   Prior to Admission Medications     Prescriptions Last Dose Informant Patient Reported? Taking?    apixaban (ELIQUIS) 5 MG tablet tablet Past Week  No Yes    Take 1 tablet by mouth Every 12 (Twelve) Hours.    aspirin 81 MG chewable tablet Past Week  Yes Yes    Chew 81 mg Daily.    b complex-vitamin c-folic acid (NEPHRO-LOIS) 0.8 MG tablet tablet Past Week  No Yes    Take 1 tablet by mouth Daily    docusate sodium (COLACE) 100 MG capsule Past Week  Yes Yes    Take 100 mg by mouth 2 (Two) Times a Day.    Elastic Bandages & Supports (JOBST OPAQUE KNEE 20-30MMHG XL) misc Past Week  No Yes    1 application Daily.    escitalopram (LEXAPRO) 10 MG tablet Past Week  No Yes    Take 1 tablet by mouth Daily    ferrous sulfate 324 (65 Fe) MG tablet delayed-release EC tablet Past Week  Yes Yes    Take 324 mg by mouth 2 (Two) Times a Day With Meals.    insulin detemir (LEVEMIR) 100 UNIT/ML injection Past Week  No Yes    Inject 10 Units under the skin into the appropriate area as directed Daily    isosorbide mononitrate (IMDUR) 30 MG 24 hr tablet Past Week  Yes Yes    Take 30 mg by mouth Daily.     lactobacillus acidophilus (RISAQUAD) capsule capsule Past Week  No Yes    Take 1 capsule by mouth 2 (Two) Times a Day    levothyroxine (SYNTHROID, LEVOTHROID) 150 MCG tablet Past Week  No Yes    Take 1 tablet by mouth Daily    levothyroxine (SYNTHROID, LEVOTHROID) 150 MCG tablet Past Week  No Yes    Take 1 tablet by mouth Daily for 30 days.    lidocaine (LIDODERM) 5 % Past Week  Yes Yes    Place 1 patch on the skin as directed by provider Daily. Apply patch to left knee daily, on at 0700 am off at 2000    metoprolol succinate XL (TOPROL-XL) 100 MG 24 hr tablet Past Week  No Yes    Take 1 tablet by mouth Daily    metoprolol succinate XL (TOPROL-XL) 50 MG 24 hr tablet Past Week  No Yes    Take 1 tablet by mouth Daily    pantoprazole (PROTONIX) 40 MG EC tablet Past Week  Yes Yes    Take 40 mg by mouth Daily.    QUEtiapine (SEROquel) 25 MG tablet Past Week  No Yes    Take 1/2 tablet by mouth Every Night    torsemide (DEMADEX) 100 MG tablet   No No    Take 1 tablet by mouth Daily        Emergency department medications:   Medications   sodium chloride 0.9 % flush 10 mL ( Intravenous MAR Unhold 5/2/22 1300)   sodium chloride 0.9 % flush 10 mL ( Intravenous MAR Unhold 5/2/22 1300)   sodium chloride 0.9 % infusion (has no administration in time range)   metoclopramide (REGLAN) injection 5 mg (has no administration in time range)   sucralfate (CARAFATE) tablet 1 g (has no administration in time range)   pantoprazole (PROTONIX) injection 40 mg (has no administration in time range)   b complex-vitamin c-folic acid (NEPHRO-LOIS) tablet 1 tablet (has no administration in time range)   docusate sodium (COLACE) capsule 100 mg (has no administration in time range)   escitalopram (LEXAPRO) tablet 10 mg (has no administration in time range)   ferrous sulfate EC tablet 324 mg (has no administration in time range)   isosorbide mononitrate (IMDUR) 24 hr tablet 30 mg (has no administration in time range)   levothyroxine (SYNTHROID,  "LEVOTHROID) tablet 150 mcg (has no administration in time range)   lidocaine (LIDODERM) 5 % 1 patch (has no administration in time range)   metoprolol succinate XL (TOPROL-XL) 24 hr tablet 50 mg (has no administration in time range)   sodium chloride 0.9 % flush 3 mL (has no administration in time range)   sodium chloride 0.9 % flush 3-10 mL (has no administration in time range)   acetaminophen (TYLENOL) tablet 650 mg (has no administration in time range)     Or   acetaminophen (TYLENOL) 160 MG/5ML solution 650 mg (has no administration in time range)     Or   acetaminophen (TYLENOL) suppository 650 mg (has no administration in time range)   ondansetron (ZOFRAN) injection 4 mg (has no administration in time range)   insulin detemir (LEVEMIR) injection 10 Units (has no administration in time range)   pantoprazole (PROTONIX) injection 80 mg (80 mg Intravenous Given 5/1/22 1727)   ondansetron (ZOFRAN) injection 4 mg (4 mg Intravenous Given 5/1/22 1753)   metoclopramide (REGLAN) injection 10 mg (10 mg Intravenous Given 5/1/22 2010)   diphenhydrAMINE (BENADRYL) injection 25 mg (25 mg Intravenous Given 5/1/22 2011)   droperidol (INAPSINE) injection 2.5 mg (2.5 mg Intravenous Given 5/1/22 2053)       Allergies:  Metformin and related and Metformin    Review of Systems  All review of system were reviewed and are negative except as mentioned in the history of present illness and assessment and plan.    Objective:  Vital Signs  /76 (BP Location: Right arm, Patient Position: Lying)   Pulse 102   Temp 98.2 °F (36.8 °C) (Oral)   Resp 18   Ht 167.4 cm (65.9\")   Wt 116 kg (255 lb 8.2 oz)   SpO2 99%   BMI 41.37 kg/m²          I/O this shift:  In: 50 [I.V.:50]  Out: -     Intake/Output Summary (Last 24 hours) at 5/2/2022 1644  Last data filed at 5/2/2022 1147  Gross per 24 hour   Intake 50 ml   Output --   Net 50 ml       Physical Exam:  General Appearance:   Alert, cooperative, in no acute distress.     Head:   " Normocephalic, without obvious abnormality, atraumatic.     Eyes:      Normal, conjunctivae and sclerae, no icterus, no pallor, corneas clear, PERRLA        Throat:   Oral mucosa dry      Neck:  No adenopathy, supple, trachea midline, no thyromegaly, no carotid bruit, no JVD        Lungs:    Clear to auscultation and fair air movement noted.      Heart::   Regular rhythm and normal rate, normal S1 and S2.       Abdomen:   Obese. Normal bowel sounds, no masses, no organomegaly, soft non-tender, non-distended, no guarding, no rebound tenderness      Genital urinary:   No urinary bladder palpable      Extremities:  Moves all extremities, no edema, no cyanosis, no redness.     Pulses:  Pulses palpable and equal bilaterally but weak.     Skin:  No bleeding, bruising or rash        Neurologic:  Cranial nerves grossly intact, move all extremities         Results Review:   Results from last 7 days   Lab Units 05/02/22  1000 05/01/22  1709   SODIUM mmol/L 133* 128*   POTASSIUM mmol/L 3.8 3.9   CHLORIDE mmol/L 91* 85*   CO2 mmol/L 29.9* 23.1   BUN mg/dL 53* 45*   CREATININE mg/dL 4.59* 4.28*   CALCIUM mg/dL 10.1 11.0*   ALBUMIN g/dL 3.50 4.10   BILIRUBIN mg/dL 0.4 0.8   ALK PHOS U/L 182* 213*   ALT (SGPT) U/L 15 21   AST (SGOT) U/L 19 27   GLUCOSE mg/dL 217* 417*     Estimated Creatinine Clearance: 16.2 mL/min (A) (by C-G formula based on SCr of 4.59 mg/dL (H)).          Results from last 7 days   Lab Units 05/02/22  1000 05/01/22  2232 05/01/22  1709   WBC 10*3/mm3 13.41*  --  17.21*   HEMOGLOBIN g/dL 11.8* 12.1 12.4   PLATELETS 10*3/mm3 130*  --  130*     Results from last 7 days   Lab Units 05/01/22  1709   INR  1.18*     Brief Urine Lab Results  (Last result in the past 365 days)      Color   Clarity   Blood   Leuk Est   Nitrite   Protein   CREAT   Urine HCG        03/30/22 1705 Yellow   Clear   Trace   Negative   Negative   >=300 mg/dL (3+)               No results found for: UTPCR  Imaging Results (Last 24 Hours)      Procedure Component Value Units Date/Time    CT Abdomen Pelvis Without Contrast [[id number]] Collected: 05/01/22 1807     Updated: 05/01/22 1911    Narrative:      FINAL REPORT    TECHNIQUE:  Axial CT images were performed from the lung bases through the  symphysis pubis without IV contrast.  This study was performed  with techniques to keep radiation doses as low as reasonably  achievable (ALARA). Individualized dose reduction techniques  using automated exposure control or adjustment of mA and/or kV  according to the patient's size were employed.    CLINICAL HISTORY:  abd pain, hematemesis    FINDINGS:  Lack of intravenous contrast limits evaluation of solid  abdominal and pelvic organs.  LOWER CHEST: There is cardiomegaly  and coronary artery disease.  The lung bases are clear.  ABDOMEN/PELVIS:  Liver, gallbladder and bile ducts: The  unenhanced liver is grossly unremarkable without focal  abnormality.  The gallbladder is unremarkable.  There is no  definite biliary duct dilatation.  Adrenal glands: The adrenal  glands are morphologically unremarkable without suspicious  lesion.  Kidneys, ureter and urinary bladder: No  nephrolithiasis.  No hydronephrosis.  Urinary bladder is  unremarkable.  Spleen: The spleen is normal size.  Pancreas: The  pancreas is atrophied.  GI systems and mesentery: There is a  small hiatal hernia.  No evidence of bowel obstruction.  The  appendix is not visualized.    No significant mesenteric  inflammation.  Lymph nodes: No definite pathologically enlarged  abdominal or pelvic lymph nodes present within the limits of  this noncontrast enhanced exam.  Vessels: The abdominal aorta is  normal in caliber.  The inferior vena cava is unremarkable.  Peritoneum: No free intraperitoneal fluid or pneumoperitoneum.  Pelvic viscera: No acute findings.  Body wall: There is  nonspecific subcutaneous inflammation of the abdominal pannus.  Cellulitis is not excluded.  Bones: No acute fracture.       Impression:      No acute findings in the abdomen and pelvis to account for  patient's symptoms.   Nonspecific edema of the abdominal pannus,  cellulitis is possible.    Authenticated by Jonathan Garay MD on 05/01/2022 06:07:48 PM        b complex-vitamin c-folic acid, 1 tablet, Oral, Daily  docusate sodium, 100 mg, Oral, BID  escitalopram, 10 mg, Oral, Daily  ferrous sulfate, 324 mg, Oral, BID With Meals  [START ON 5/3/2022] insulin detemir, 10 Units, Subcutaneous, Daily  isosorbide mononitrate, 30 mg, Oral, Daily  levothyroxine, 150 mcg, Oral, Daily  lidocaine, 1 patch, Transdermal, Q24H  metoclopramide, 5 mg, Intravenous, Q6H  metoprolol succinate XL, 50 mg, Oral, Daily  pantoprazole, 40 mg, Intravenous, BID AC  sodium chloride, 3 mL, Intravenous, Q12H  sucralfate, 1 g, Oral, 4x Daily AC & at Bedtime      sodium chloride, 70 mL/hr        Assessment/Plan:    1.   End stage renal disease on dialysis (HCC)    2.   Coffee ground emesis  3.   Anemia, chronic disease  4.   Essential hypertension  5.   Acquired hypothyroidism  6.   Type 2 diabetes mellitus with nephropathy (HCC)  7.   Impaired functional mobility and activity tolerance  8.   Chronic diastolic congestive heart failure (HCC)  9.   Pulmonary hypertension (HCC)  10.   Cor pulmonale, chronic (HCC)  11.   Esophagitis      Risk and complexity: High       Plan:  · Patient appears to be fairly stable no active bleed is noted.  Her electrolytes are fairly stable as is her volume status.  I will go ahead and plan on doing dialysis tomorrow.  We will try to avoid any medications that may irritate the stomach, her iron stores are adequate we will stop the oral iron supplements.  · Continue with rest of the current treatment plan and surveillance labs.  · Details were discussed with the patient no family in the room.    · Details were also discussed with the hospitalist service and Dr. Chance.  · Further recommendations will depend on clinical course of the patient  during the current hospitalization.    · I also discussed the details with the nursing staff.  · Rest as ordered.    In closing, I sincerely appreciate opportunity to participate in care of this patient. If I can be of any further assistance with the management of this patient, please don’t hesitate to contact me.    Milad Leone MD    05/02/22  16:44 EDT    Dictated using Dragon.

## 2022-05-02 NOTE — ANESTHESIA POSTPROCEDURE EVALUATION
Patient: Millicent Mckeon    Procedure Summary     Date: 05/02/22 Room / Location: [facility] ENDOSCOPY 1 / [facility] ENDOSCOPY    Anesthesia Start: 1140 Anesthesia Stop: 1150    Procedure: ESOPHAGOGASTRODUODENOSCOPY WITH BIOPSY (N/A Esophagus) Diagnosis:       Coffee ground emesis      (Coffee ground emesis [K92.0])    Surgeons: Jacob Chance MD Provider: Carlos Ahn CRNA    Anesthesia Type: MAC ASA Status: 4 - Emergent          Anesthesia Type: MAC    Vitals  No vitals data found for the desired time range.          Post Anesthesia Care and Evaluation    Patient location during evaluation: bedside  Patient participation: complete - patient participated  Level of consciousness: awake and alert  Pain score: 0  Pain management: adequate  Airway patency: patent  Anesthetic complications: No anesthetic complications  PONV Status: none  Cardiovascular status: acceptable  Respiratory status: acceptable  Hydration status: acceptable

## 2022-05-03 LAB
ANION GAP SERPL CALCULATED.3IONS-SCNC: 14.6 MMOL/L (ref 5–15)
ANISOCYTOSIS BLD QL: NORMAL
BASOPHILS # BLD AUTO: 0.06 10*3/MM3 (ref 0–0.2)
BASOPHILS NFR BLD AUTO: 0.6 % (ref 0–1.5)
BUN SERPL-MCNC: 62 MG/DL (ref 8–23)
BUN/CREAT SERPL: 12.1 (ref 7–25)
CALCIUM SPEC-SCNC: 9.7 MG/DL (ref 8.6–10.5)
CHLORIDE SERPL-SCNC: 92 MMOL/L (ref 98–107)
CO2 SERPL-SCNC: 27.4 MMOL/L (ref 22–29)
CREAT SERPL-MCNC: 5.11 MG/DL (ref 0.57–1)
DEPRECATED RDW RBC AUTO: 66.6 FL (ref 37–54)
EGFRCR SERPLBLD CKD-EPI 2021: 9 ML/MIN/1.73
EOSINOPHIL # BLD AUTO: 0.2 10*3/MM3 (ref 0–0.4)
EOSINOPHIL NFR BLD AUTO: 1.9 % (ref 0.3–6.2)
ERYTHROCYTE [DISTWIDTH] IN BLOOD BY AUTOMATED COUNT: 23 % (ref 12.3–15.4)
GLUCOSE BLDC GLUCOMTR-MCNC: 147 MG/DL (ref 70–130)
GLUCOSE BLDC GLUCOMTR-MCNC: 161 MG/DL (ref 70–130)
GLUCOSE BLDC GLUCOMTR-MCNC: 180 MG/DL (ref 70–130)
GLUCOSE BLDC GLUCOMTR-MCNC: 208 MG/DL (ref 70–130)
GLUCOSE BLDC GLUCOMTR-MCNC: 252 MG/DL (ref 70–130)
GLUCOSE SERPL-MCNC: 222 MG/DL (ref 65–99)
HAV IGM SERPL QL IA: NORMAL
HBV CORE IGM SERPL QL IA: NORMAL
HBV SURFACE AG SERPL QL IA: NORMAL
HCT VFR BLD AUTO: 36.1 % (ref 34–46.6)
HCV AB SER DONR QL: NORMAL
HGB BLD-MCNC: 11.2 G/DL (ref 12–15.9)
HYPOCHROMIA BLD QL: NORMAL
IMM GRANULOCYTES # BLD AUTO: 0.06 10*3/MM3 (ref 0–0.05)
IMM GRANULOCYTES NFR BLD AUTO: 0.6 % (ref 0–0.5)
LYMPHOCYTES # BLD AUTO: 1.01 10*3/MM3 (ref 0.7–3.1)
LYMPHOCYTES NFR BLD AUTO: 9.4 % (ref 19.6–45.3)
MCH RBC QN AUTO: 24.9 PG (ref 26.6–33)
MCHC RBC AUTO-ENTMCNC: 31 G/DL (ref 31.5–35.7)
MCV RBC AUTO: 80.2 FL (ref 79–97)
MONOCYTES # BLD AUTO: 1.21 10*3/MM3 (ref 0.1–0.9)
MONOCYTES NFR BLD AUTO: 11.2 % (ref 5–12)
NEUTROPHILS NFR BLD AUTO: 76.3 % (ref 42.7–76)
NEUTROPHILS NFR BLD AUTO: 8.24 10*3/MM3 (ref 1.7–7)
NRBC BLD AUTO-RTO: 0 /100 WBC (ref 0–0.2)
PLAT MORPH BLD: NORMAL
PLATELET # BLD AUTO: 116 10*3/MM3 (ref 140–450)
PMV BLD AUTO: 10 FL (ref 6–12)
POTASSIUM SERPL-SCNC: 3.9 MMOL/L (ref 3.5–5.2)
RBC # BLD AUTO: 4.5 10*6/MM3 (ref 3.77–5.28)
SODIUM SERPL-SCNC: 134 MMOL/L (ref 136–145)
WBC MORPH BLD: NORMAL
WBC NRBC COR # BLD: 10.78 10*3/MM3 (ref 3.4–10.8)

## 2022-05-03 PROCEDURE — 99225 PR SBSQ OBSERVATION CARE/DAY 25 MINUTES: CPT | Performed by: NURSE PRACTITIONER

## 2022-05-03 PROCEDURE — 63710000001 LEVOTHYROXINE 150 MCG TABLET: Performed by: INTERNAL MEDICINE

## 2022-05-03 PROCEDURE — 25010000002 METOCLOPRAMIDE PER 10 MG: Performed by: INTERNAL MEDICINE

## 2022-05-03 PROCEDURE — 80048 BASIC METABOLIC PNL TOTAL CA: CPT | Performed by: INTERNAL MEDICINE

## 2022-05-03 PROCEDURE — A9270 NON-COVERED ITEM OR SERVICE: HCPCS | Performed by: INTERNAL MEDICINE

## 2022-05-03 PROCEDURE — 63710000001 INSULIN DETEMIR PER 5 UNITS: Performed by: INTERNAL MEDICINE

## 2022-05-03 PROCEDURE — 82962 GLUCOSE BLOOD TEST: CPT

## 2022-05-03 PROCEDURE — G0257 UNSCHED DIALYSIS ESRD PT HOS: HCPCS

## 2022-05-03 PROCEDURE — 97162 PT EVAL MOD COMPLEX 30 MIN: CPT

## 2022-05-03 PROCEDURE — 63710000001 ISOSORBIDE MONONITRATE 30 MG TABLET SUSTAINED-RELEASE 24 HOUR: Performed by: INTERNAL MEDICINE

## 2022-05-03 PROCEDURE — 63710000001 DOXYCYCLINE 100 MG CAPSULE: Performed by: NURSE PRACTITIONER

## 2022-05-03 PROCEDURE — A9270 NON-COVERED ITEM OR SERVICE: HCPCS | Performed by: NURSE PRACTITIONER

## 2022-05-03 PROCEDURE — 85007 BL SMEAR W/DIFF WBC COUNT: CPT | Performed by: INTERNAL MEDICINE

## 2022-05-03 PROCEDURE — 63710000001 SUCRALFATE 1 G TABLET: Performed by: INTERNAL MEDICINE

## 2022-05-03 PROCEDURE — 80074 ACUTE HEPATITIS PANEL: CPT | Performed by: INTERNAL MEDICINE

## 2022-05-03 PROCEDURE — 99232 SBSQ HOSP IP/OBS MODERATE 35: CPT | Performed by: PHYSICIAN ASSISTANT

## 2022-05-03 PROCEDURE — 63710000001 LIDOCAINE 5 % PATCH: Performed by: INTERNAL MEDICINE

## 2022-05-03 PROCEDURE — 97166 OT EVAL MOD COMPLEX 45 MIN: CPT

## 2022-05-03 PROCEDURE — 63710000001 METOPROLOL SUCCINATE XL 50 MG TABLET SUSTAINED-RELEASE 24 HOUR: Performed by: INTERNAL MEDICINE

## 2022-05-03 PROCEDURE — 63710000001 DOCUSATE SODIUM 100 MG CAPSULE: Performed by: INTERNAL MEDICINE

## 2022-05-03 PROCEDURE — 63710000001 ESCITALOPRAM 10 MG TABLET: Performed by: INTERNAL MEDICINE

## 2022-05-03 PROCEDURE — G0378 HOSPITAL OBSERVATION PER HR: HCPCS

## 2022-05-03 PROCEDURE — 63710000001 B COMPLEX-VITAMIN C-FOLIC ACID 0.8 MG TABLET: Performed by: INTERNAL MEDICINE

## 2022-05-03 PROCEDURE — 85025 COMPLETE CBC W/AUTO DIFF WBC: CPT | Performed by: INTERNAL MEDICINE

## 2022-05-03 RX ORDER — DOXYCYCLINE 100 MG/1
100 CAPSULE ORAL EVERY 12 HOURS SCHEDULED
Status: DISCONTINUED | OUTPATIENT
Start: 2022-05-03 | End: 2022-05-04 | Stop reason: HOSPADM

## 2022-05-03 RX ADMIN — DOXYCYCLINE 100 MG: 100 CAPSULE ORAL at 20:54

## 2022-05-03 RX ADMIN — LEVOTHYROXINE SODIUM 150 MCG: 150 TABLET ORAL at 09:01

## 2022-05-03 RX ADMIN — METOPROLOL SUCCINATE 50 MG: 50 TABLET, EXTENDED RELEASE ORAL at 09:01

## 2022-05-03 RX ADMIN — DOCUSATE SODIUM 100 MG: 100 CAPSULE, LIQUID FILLED ORAL at 20:54

## 2022-05-03 RX ADMIN — LIDOCAINE 1 PATCH: 50 PATCH CUTANEOUS at 20:54

## 2022-05-03 RX ADMIN — INSULIN DETEMIR 10 UNITS: 100 INJECTION, SOLUTION SUBCUTANEOUS at 09:17

## 2022-05-03 RX ADMIN — Medication 1 TABLET: at 09:01

## 2022-05-03 RX ADMIN — DOCUSATE SODIUM 100 MG: 100 CAPSULE, LIQUID FILLED ORAL at 09:01

## 2022-05-03 RX ADMIN — SUCRALFATE 1 G: 1 TABLET ORAL at 06:56

## 2022-05-03 RX ADMIN — ESCITALOPRAM OXALATE 10 MG: 10 TABLET ORAL at 09:01

## 2022-05-03 RX ADMIN — Medication 3 ML: at 09:01

## 2022-05-03 RX ADMIN — SUCRALFATE 1 G: 1 TABLET ORAL at 12:41

## 2022-05-03 RX ADMIN — Medication 3 ML: at 20:54

## 2022-05-03 RX ADMIN — PANTOPRAZOLE SODIUM 40 MG: 40 INJECTION, POWDER, FOR SOLUTION INTRAVENOUS at 06:56

## 2022-05-03 RX ADMIN — METOCLOPRAMIDE 5 MG: 5 INJECTION, SOLUTION INTRAMUSCULAR; INTRAVENOUS at 14:56

## 2022-05-03 RX ADMIN — METOCLOPRAMIDE 5 MG: 5 INJECTION, SOLUTION INTRAMUSCULAR; INTRAVENOUS at 09:01

## 2022-05-03 RX ADMIN — METOCLOPRAMIDE 5 MG: 5 INJECTION, SOLUTION INTRAMUSCULAR; INTRAVENOUS at 20:54

## 2022-05-03 RX ADMIN — SUCRALFATE 1 G: 1 TABLET ORAL at 20:54

## 2022-05-03 RX ADMIN — ISOSORBIDE MONONITRATE 30 MG: 30 TABLET, EXTENDED RELEASE ORAL at 09:01

## 2022-05-03 RX ADMIN — METOCLOPRAMIDE 5 MG: 5 INJECTION, SOLUTION INTRAMUSCULAR; INTRAVENOUS at 04:45

## 2022-05-03 NOTE — THERAPY EVALUATION
Patient Name: Millicent Mckeon  : 1960    MRN: [id number]                              Today's Date: 5/3/2022       Admit Date: 2022    Visit Dx:     ICD-10-CM ICD-9-CM   1. Coffee ground emesis  K92.0 578.0   2. Gastrointestinal hemorrhage, unspecified gastrointestinal hemorrhage type  K92.2 578.9     Patient Active Problem List   Diagnosis   • Altered mental status   • Anemia, chronic disease   • Bilateral edema of lower extremity   • Cellulitis of lower extremity   • Acute on chronic renal failure (HCC)   • GERD (gastroesophageal reflux disease)   • Hyperkalemia   • Essential hypertension   • Acquired hypothyroidism   • Type 2 diabetes mellitus with nephropathy ([facility])   • Vitamin B12 deficiency   • Cellulitis of both lower extremities   • Anemia due to stage 4 chronic kidney disease ([facility])   • Acute renal failure (ARF) ([facility])   • Hyperkalemia   • Impaired functional mobility and activity tolerance   • Chronic diastolic congestive heart failure ([facility])   • Paroxysmal atrial fibrillation with rapid ventricular response ([facility])   • Pulmonary hypertension ([facility])   • Cor pulmonale, chronic ([facility])   • Chronic venous hypertension involving both sides   • Lymphedema of both lower extremities   • Obesity, morbid, BMI 50 or higher ([facility])   • A-fib ([facility])   • CKD (chronic kidney disease) stage 3, GFR 30-59 ml/min ([facility])   • Acute bronchitis due to other specified organisms   • Tachycardia-bradycardia syndrome ([facility])   • Pressure ulcer of right heel, unstageable ([facility])   • Mucopurulent chronic bronchitis ([facility])   • Acute pulmonary edema ([facility])   • Thrombocytopenia, unspecified ([facility])   • Chronic kidney disease, stage IV (severe) ([facility])   • Hypoglycemia   • Bradycardia   • Coffee ground emesis   • Esophagitis   • End stage renal disease on dialysis ([facility])     Past Medical History:   Diagnosis Date   • A-fib ([facility])    • Anemia 10/2/2018   • Diabetes mellitus ([facility])    • Disease of thyroid gland    • GERD (gastroesophageal reflux  disease)    • Gout    • History of transfusion    • Hypertension      Past Surgical History:   Procedure Laterality Date   • ENDOSCOPY N/A 5/2/2022    Procedure: ESOPHAGOGASTRODUODENOSCOPY WITH BIOPSY;  Surgeon: Jacbo Chance MD;  Location: [facility] ENDOSCOPY;  Service: Gastroenterology;  Laterality: N/A;   • EYE SURGERY     • INCISION AND DRAINAGE LEG Right 1/14/2019    Procedure: Right heel incision and drainage with graft application;  Surgeon: Ar Rueda DPM;  Location: [facility] OR;  Service: Podiatry   • INSERTION HEMODIALYSIS CATHETER N/A 4/5/2022    Procedure: HEMODIALYSIS CATHETER INSERTION;  Surgeon: Jean Carlos Guadalupe MD;  Location: [facility] OR;  Service: General;  Laterality: N/A;   • LEG SURGERY        General Information     Row Name 05/03/22 1254          OT Time and Intention    Document Type evaluation  -SD     Mode of Treatment occupational therapy  -SD     Row Name 05/03/22 1254          General Information    Patient Profile Reviewed yes  -SD     Prior Level of Function independent:;transfer;w/c or scooter;min assist:;ADL's  -SD     Existing Precautions/Restrictions fall  -SD     Barriers to Rehab medically complex;previous functional deficit  -SD     Row Name 05/03/22 1254          Living Environment    People in Home alone;other (see comments)  brother nearby  -SD     Row Name 05/03/22 1254          Home Main Entrance    Number of Stairs, Main Entrance none  -SD     Row Name 05/03/22 1254          Stairs Within Home, Primary    Number of Stairs, Within Home, Primary none  -SD     Row Name 05/03/22 1254          Cognition    Orientation Status (Cognition) oriented x 4  -SD     Row Name 05/03/22 1254          Safety Issues, Functional Mobility    Safety Issues Affecting Function (Mobility) safety precautions follow-through/compliance;safety precaution awareness;awareness of need for assistance  -SD     Impairments Affecting Function (Mobility) balance;endurance/activity  tolerance;strength;pain  -SD           User Key  (r) = Recorded By, (t) = Taken By, (c) = Cosigned By    Initials Name Provider Type    SD Hannah Santos OT Occupational Therapist                 Mobility/ADL's     Row Name 05/03/22 1257          Bed Mobility    Bed Mobility supine-sit  -SD     Supine-Sit [place] (Bed Mobility) moderate assist (50% patient effort)  -SD     Bed Mobility, Safety Issues decreased use of arms for pushing/pulling;decreased use of legs for bridging/pushing;impaired trunk control for bed mobility  -SD     Assistive Device (Bed Mobility) bed rails;draw sheet;head of bed elevated  -SD     Row Name 05/03/22 1257          Transfers    Transfers sit-stand transfer;bed-chair transfer  -SD     Bed-Chair [place] (Transfers) minimum assist (75% patient effort);2 person assist  -SD     Assistive Device (Bed-Chair Transfers) walker, front-wheeled  -SD     Sit-Stand [place] (Transfers) minimum assist (75% patient effort);2 person assist  -SD     Row Name 05/03/22 1257          Sit-Stand Transfer    Assistive Device (Sit-Stand Transfers) walker, front-wheeled  -[facility] Name 05/03/22 1257          Functional Mobility    Functional Mobility- Ind. Level not tested  -SD     Functional Mobility- Comment using WC for mobility at home  -SD     Row Name 05/03/22 1257          Activities of Daily Living    BADL Assessment/Intervention bathing;upper body dressing;lower body dressing;grooming;feeding;toileting  -SD     Row Name 05/03/22 1257          Bathing Assessment/Intervention    [place] Level (Bathing) distal lower extremities/feet;perineal area;moderate assist (50% patient effort)  -SD     Row Name 05/03/22 1257          Upper Body Dressing Assessment/Training    [place] Level (Upper Body Dressing) set up  -SD     Row Name 05/03/22 1257          Lower Body Dressing Assessment/Training    [place] Level (Lower Body Dressing) don;pants/bottoms;moderate assist (50% patient  effort)  -SD     Comment, (Lower Body Dressing) patient states at home she wears gowns and slip on shoes, if she does need socks her brother assists her.  -[facility] Name 05/03/22 1257          Grooming Assessment/Training    [place] Level (Grooming) minimum assist (75% patient effort)  -SD     Comment, (Grooming) hair is very tangled and matted, patient states she normally doesn't wash it as she sponge bathes; her brother assists her as needed.  -SD     Row Name 05/03/22 1257          Self-Feeding Assessment/Training    [place] Level (Feeding) set up  -SD     Row Name 05/03/22 1257          Toileting Assessment/Training    [place] Level (Toileting) moderate assist (50% patient effort)  -SD     Assistive Devices (Toileting) commode, bedside without drop arms  -SD           User Key  (r) = Recorded By, (t) = Taken By, (c) = Cosigned By    Initials Name Provider Type    SD Hannah Santos OT Occupational Therapist               Obj/Interventions     Row Name 05/03/22 1303          Range of Motion Comprehensive    General Range of Motion bilateral upper extremity ROM WFL  -SD     Row Name 05/03/22 1303          Strength Comprehensive (MMT)    General Manual Muscle Testing (MMT) Assessment upper extremity strength deficits identified  -SD     Comment, General Manual Muscle Testing (MMT) Assessment UB 3+/5  -SD           User Key  (r) = Recorded By, (t) = Taken By, (c) = Cosigned By    Initials Name Provider Type    SD Hannah Santos OT Occupational Therapist               Goals/Plan     Row Name 05/03/22 1309          Transfer Goal 1 (OT)    Activity/Assistive Device (Transfer Goal 1, OT) sit-to-stand/stand-to-sit;walker, rolling  -SD     [place] Level/Cues Needed (Transfer Goal 1, OT) contact guard required  -SD     Time Frame (Transfer Goal 1, OT) long term goal (LTG)  -SD     Progress/Outcome (Transfer Goal 1, OT) goal ongoing  -SD     Row Name 05/03/22 1309          Dressing Goal 1 (OT)     Activity/Device (Dressing Goal 1, OT) lower body dressing  -SD     [place]/Cues Needed (Dressing Goal 1, OT) minimum assist (75% or more patient effort)  -SD     Time Frame (Dressing Goal 1, OT) 2 weeks  -SD     Progress/Outcome (Dressing Goal 1, OT) goal ongoing  -SD     Row Name 05/03/22 1309          Toileting Goal 1 (OT)    Activity/Device (Toileting Goal 1, OT) toileting skills, all;commode, bedside without drop arms  -SD     [place] Level/Cues Needed (Toileting Goal 1, OT) minimum assist (75% or more patient effort)  -SD     Time Frame (Toileting Goal 1, OT) 2 weeks  -SD     Progress/Outcome (Toileting Goal 1, OT) goal ongoing  -SD     Row Name 05/03/22 1309          Strength Goal 1 (OT)    Strength Goal 1 (OT) Patient to perform UB ther ex as tolerated  -SD     Time Frame (Strength Goal 1, OT) long term goal (LTG)  -SD     Progress/Outcome (Strength Goal 1, OT) goal ongoing  -SD     Row Name 05/03/22 1309          Therapy Assessment/Plan (OT)    Planned Therapy Interventions (OT) activity tolerance training;adaptive equipment training;BADL retraining;patient/caregiver education/training;ROM/therapeutic exercise;strengthening exercise;transfer/mobility retraining  -SD           User Key  (r) = Recorded By, (t) = Taken By, (c) = Cosigned By    Initials Name Provider Type    Hannah Williamson OT Occupational Therapist               Clinical Impression     Row Name 05/03/22 1303          Pain Assessment    Pretreatment Pain Rating 0/10 - no pain  -SD     Posttreatment Pain Rating 0/10 - no pain  -SD     Row Name 05/03/22 1303          Plan of Care Review    Plan of Care Reviewed With patient  -SD     Progress no change  -SD     Outcome Evaluation OT eval completed. Patient presents deficits in strength, endurance, balance, mobility and ADL performance. Patient completed bed mobility with mod A, transfer to chair with min A x 2 using RW, patient requires mod A overall with LB self care and toileting  and per patient her brother assists her as needed with this at home. Patient is expected to benefit from continued OT services prior to DC and would benefit from HH services at discharge.  -SD     Row Name 05/03/22 1303          Therapy Assessment/Plan (OT)    Patient/Family Therapy Goal Statement (OT) Home with brother assisting  -SD     Rehab Potential (OT) good, to achieve stated therapy goals  -SD     Criteria for Skilled Therapeutic Interventions Met (OT) skilled treatment is necessary  -SD     Therapy Frequency (OT) 3 times/wk  5 times if indicated  -SD     Row Name 05/03/22 1303          Therapy Plan Review/Discharge Plan (OT)    Anticipated Discharge Disposition (OT) home with assist;home with home health  -SD     Row Name 05/03/22 1303          Positioning and Restraints    Pre-Treatment Position in bed  -SD     Post Treatment Position chair  -SD     In Chair reclined;call light within reach;encouraged to call for assist;exit alarm on  -SD           User Key  (r) = Recorded By, (t) = Taken By, (c) = Cosigned By    Initials Name Provider Type    Hannah Williamson OT Occupational Therapist               Outcome Measures     Row Name 05/03/22 1310          How much help from another is currently needed...    Putting on and taking off regular lower body clothing? 2  -SD     Bathing (including washing, rinsing, and drying) 2  -SD     Toileting (which includes using toilet bed pan or urinal) 2  -SD     Putting on and taking off regular upper body clothing 3  -SD     Taking care of personal grooming (such as brushing teeth) 3  -SD     Eating meals 3  -SD     AM-PAC 6 Clicks Score (OT) 15  -SD     Row Name 05/03/22 1214          How much help from another person do you currently need...    Turning from your back to your side while in flat bed without using bedrails? 2  -MS     Moving from lying on back to sitting on the side of a flat bed without bedrails? 2  -MS     Moving to and from a bed to a chair  (including a wheelchair)? 3  -MS     Standing up from a chair using your arms (e.g., wheelchair, bedside chair)? 3  -MS     Climbing 3-5 steps with a railing? 1  -MS     To walk in hospital room? 1  -MS     AM-PAC 6 Clicks Score (PT) 12  -MS     Highest level of mobility 4 --> Transferred to chair/commode  -MS     Row Name 05/03/22 1310 05/03/22 1214       Functional Assessment    Outcome Measure Options AM-PAC 6 Clicks Daily Activity (OT)  -SD AM-PAC 6 Clicks Basic Mobility (PT)  -MS          User Key  (r) = Recorded By, (t) = Taken By, (c) = Cosigned By    Initials Name Provider Type    Hannah Williamson, OT Occupational Therapist    Rajan Guthrie, PT Physical Therapist                Occupational Therapy Education                 Title: PT OT SLP Therapies (In Progress)     Topic: Occupational Therapy (In Progress)     Point: ADL training (Done)     Description:   Instruct learner(s) on proper safety adaptation and remediation techniques during self care or transfers.   Instruct in proper use of assistive devices.              Learning Progress Summary           Patient Acceptance, E,TB, VU by SD at 5/3/2022 1311    Comment: Benefit of OT; OT POC                   Point: Home exercise program (Not Started)     Description:   Instruct learner(s) on appropriate technique for monitoring, assisting and/or progressing therapeutic exercises/activities.              Learner Progress:  Not documented in this visit.          Point: Precautions (Not Started)     Description:   Instruct learner(s) on prescribed precautions during self-care and functional transfers.              Learner Progress:  Not documented in this visit.          Point: Body mechanics (Not Started)     Description:   Instruct learner(s) on proper positioning and spine alignment during self-care, functional mobility activities and/or exercises.              Learner Progress:  Not documented in this visit.                      User Key      Initials Effective Dates Name Provider Type Discipline    SD 06/16/21 -  Hannah Santos OT Occupational Therapist OT              OT Recommendation and Plan  Planned Therapy Interventions (OT): activity tolerance training, adaptive equipment training, BADL retraining, patient/caregiver education/training, ROM/therapeutic exercise, strengthening exercise, transfer/mobility retraining  Therapy Frequency (OT): 3 times/wk (5 times if indicated)  Plan of Care Review  Plan of Care Reviewed With: patient  Progress: no change  Outcome Evaluation: OT eval completed. Patient presents deficits in strength, endurance, balance, mobility and ADL performance. Patient completed bed mobility with mod A, transfer to chair with min A x 2 using RW, patient requires mod A overall with LB self care and toileting and per patient her brother assists her as needed with this at home. Patient is expected to benefit from continued OT services prior to DC and would benefit from HH services at discharge.     Time Calculation:    Time Calculation- OT     Row Name 05/03/22 1312             Time Calculation- OT    OT Start Time 1023  -SD      OT Received On 05/03/22  -SD      OT Goal Re-Cert Due Date 05/13/22  -SD              Untimed Charges    OT Eval/Re-eval Minutes 45  -SD              Total Minutes    Untimed Charges Total Minutes 45  -SD       Total Minutes 45  -SD            User Key  (r) = Recorded By, (t) = Taken By, (c) = Cosigned By    Initials Name Provider Type    SD Hannah Santos OT Occupational Therapist              Therapy Charges for Today     Code Description Service Date Service Provider Modifiers Qty    [id number]  OT EVAL MOD COMPLEXITY 3 5/3/2022 Hannah Santos OT GO 1               aHnnah Santos OT  5/3/2022

## 2022-05-03 NOTE — NURSING NOTE
"Seen for wound consult. Bilateral lower legs and feet with andressa appearance, dry peeling skin and scabbed areas in various stages of healing. No s/s of infection. Circular scars evident on body. Reports she is a \"\".  Abdominal and breast folds with body odor, no open areas. Panus with dark dimpled skin changes consistent with recurring cellulitis. Skin fold above intergluteal cleft with masd changes.   Recommendations for care include a turning schedule, barrier cream twice daily and prn after cleansing, using dry sheets in folds if moisture noted, float heels off bed with pillows or heel lift boots, encourage increase mobility as appropriate and tolerated to reduce pressure, for dry skin apply lotion/cream and a nutrition consult for dietary needs. Staff to contact provider and re-consult wound nurse for new skin issues or lack of improvement with current recommendations. Thank you for the consult. If you have questions or concerns do not hesitate to contact me.     "

## 2022-05-03 NOTE — PLAN OF CARE
Goal Outcome Evaluation:  Plan of Care Reviewed With: patient        Progress: no change  Outcome Evaluation: Pt participated in PT eval this date. Pt required modA for sup-sit transfer. Pt was able to maintain static sitting balance with CGA. Pt stood with Rodriguez x2 and Rwx. Pt transferred to the chair [place] x2. Pt is expected to benefit from skilled PTx during this inpatient stay to address deficits in strength, mobility and transfers.

## 2022-05-03 NOTE — THERAPY EVALUATION
Patient Name: Millicent Mckeon  : 1960    MRN: [id number]                              Today's Date: 5/3/2022       Admit Date: 2022    Visit Dx:     ICD-10-CM ICD-9-CM   1. Coffee ground emesis  K92.0 578.0   2. Gastrointestinal hemorrhage, unspecified gastrointestinal hemorrhage type  K92.2 578.9     Patient Active Problem List   Diagnosis   • Altered mental status   • Anemia, chronic disease   • Bilateral edema of lower extremity   • Cellulitis of lower extremity   • Acute on chronic renal failure (HCC)   • GERD (gastroesophageal reflux disease)   • Hyperkalemia   • Essential hypertension   • Acquired hypothyroidism   • Type 2 diabetes mellitus with nephropathy ([facility])   • Vitamin B12 deficiency   • Cellulitis of both lower extremities   • Anemia due to stage 4 chronic kidney disease ([facility])   • Acute renal failure (ARF) ([facility])   • Hyperkalemia   • Impaired functional mobility and activity tolerance   • Chronic diastolic congestive heart failure ([facility])   • Paroxysmal atrial fibrillation with rapid ventricular response ([facility])   • Pulmonary hypertension ([facility])   • Cor pulmonale, chronic ([facility])   • Chronic venous hypertension involving both sides   • Lymphedema of both lower extremities   • Obesity, morbid, BMI 50 or higher ([facility])   • A-fib ([facility])   • CKD (chronic kidney disease) stage 3, GFR 30-59 ml/min ([facility])   • Acute bronchitis due to other specified organisms   • Tachycardia-bradycardia syndrome ([facility])   • Pressure ulcer of right heel, unstageable ([facility])   • Mucopurulent chronic bronchitis ([facility])   • Acute pulmonary edema ([facility])   • Thrombocytopenia, unspecified ([facility])   • Chronic kidney disease, stage IV (severe) ([facility])   • Hypoglycemia   • Bradycardia   • Coffee ground emesis   • Esophagitis   • End stage renal disease on dialysis ([facility])     Past Medical History:   Diagnosis Date   • A-fib ([facility])    • Anemia 10/2/2018   • Diabetes mellitus ([facility])    • Disease of thyroid gland    • GERD (gastroesophageal reflux  disease)    • Gout    • History of transfusion    • Hypertension      Past Surgical History:   Procedure Laterality Date   • ENDOSCOPY N/A 5/2/2022    Procedure: ESOPHAGOGASTRODUODENOSCOPY WITH BIOPSY;  Surgeon: Jaocb Chance MD;  Location: [facility] ENDOSCOPY;  Service: Gastroenterology;  Laterality: N/A;   • EYE SURGERY     • INCISION AND DRAINAGE LEG Right 1/14/2019    Procedure: Right heel incision and drainage with graft application;  Surgeon: Ar Rueda DPM;  Location: [facility] OR;  Service: Podiatry   • INSERTION HEMODIALYSIS CATHETER N/A 4/5/2022    Procedure: HEMODIALYSIS CATHETER INSERTION;  Surgeon: Jean Carlos Guadalupe MD;  Location: [facility] OR;  Service: General;  Laterality: N/A;   • LEG SURGERY        General Information     Row Name 05/03/22 1152          Physical Therapy Time and Intention    Document Type evaluation  -MS     Mode of Treatment physical therapy  -MS     Row Name 05/03/22 1152          General Information    Patient Profile Reviewed yes  -MS     Prior Level of Function transfer;w/c or scooter;independent:  -MS     Row Name 05/03/22 1152          Living Environment    People in Home alone;other (see comments)  brother lives nearby  -MS     Row Name 05/03/22 1152          Home Main Entrance    Number of Stairs, Main Entrance none  -MS     Row Name 05/03/22 1152          Stairs Within Home, Primary    Number of Stairs, Within Home, Primary none  -MS     Row Name 05/03/22 1152          Cognition    Orientation Status (Cognition) oriented x 4  -MS     Row Name 05/03/22 1152          Safety Issues, Functional Mobility    Safety Issues Affecting Function (Mobility) ability to follow commands;awareness of need for assistance;safety precaution awareness;impulsivity;positioning of assistive device;insight into deficits/self-awareness;safety precautions follow-through/compliance;steps too close to assistive device  -MS     Impairments Affecting Function (Mobility)  balance;endurance/activity tolerance;pain;strength;sensation/sensory awareness;motor planning  -MS           User Key  (r) = Recorded By, (t) = Taken By, (c) = Cosigned By    Initials Name Provider Type    Rajan Guthrie PT Physical Therapist               Mobility     Row Name 05/03/22 1157          Bed Mobility    Bed Mobility bed mobility (all) activities  -MS     All Activities, [place] (Bed Mobility) moderate assist (50% patient effort)  -MS     Assistive Device (Bed Mobility) bed rails;draw sheet;head of bed elevated  -MS     Row Name 05/03/22 1157          Bed-Chair Transfer    Bed-Chair [place] (Transfers) minimum assist (75% patient effort);2 person assist  -MS     Assistive Device (Bed-Chair Transfers) walker, front-wheeled  -MS     Row Name 05/03/22 1157          Sit-Stand Transfer    Sit-Stand [place] (Transfers) minimum assist (75% patient effort);2 person assist  -MS     Assistive Device (Sit-Stand Transfers) walker, front-wheeled  -MS     Row Name 05/03/22 1157          Gait/Stairs (Locomotion)    [place] Level (Gait) not tested  -MS           User Key  (r) = Recorded By, (t) = Taken By, (c) = Cosigned By    Initials Name Provider Type    Rajan Guthrie PT Physical Therapist               Obj/Interventions     Row Name 05/03/22 1159          Range of Motion Comprehensive    General Range of Motion bilateral lower extremity ROM WFL  -MS     Row Name 05/03/22 1159          Strength Comprehensive (MMT)    General Manual Muscle Testing (MMT) Assessment lower extremity strength deficits identified  -MS     Comment, General Manual Muscle Testing (MMT) Assessment BL E 3+/5  -MS     Row Name 05/03/22 1159          Sensory Assessment (Somatosensory)    Sensory Assessment (Somatosensory) sensation intact  -MS           User Key  (r) = Recorded By, (t) = Taken By, (c) = Cosigned By    Initials Name Provider Type    Rajan Guthrie PT Physical Therapist                Goals/Plan     Row Name 05/03/22 1212          Bed Mobility Goal 1 (PT)    Activity/Assistive Device (Bed Mobility Goal 1, PT) bed mobility activities, all  -MS     [place] Level/Cues Needed (Bed Mobility Goal 1, PT) contact guard required  -MS     Time Frame (Bed Mobility Goal 1, PT) long term goal (LTG);10 days  -MS     Row Name 05/03/22 1212          Transfer Goal 1 (PT)    Activity/Assistive Device (Transfer Goal 1, PT) transfers, all;sit-to-stand/stand-to-sit  -MS     [place] Level/Cues Needed (Transfer Goal 1, PT) standby assist  -MS     Time Frame (Transfer Goal 1, PT) long term goal (LTG);10 days  -MS     Row Name 05/03/22 1212          Patient Education Goal (PT)    Activity (Patient Education Goal, PT) ther exer x15 reps  -MS     [place]/Cues/Accuracy (Memory Goal 2, PT) demonstrates adequately  -MS     Time Frame (Patient Education Goal, PT) long term goal (LTG);10 days  -MS     Row Name 05/03/22 1212          Therapy Assessment/Plan (PT)    Planned Therapy Interventions (PT) balance training;bed mobility training;gait training;home exercise program;ROM (range of motion);stair training;strengthening;prosthetic fitting/training;transfer training;stretching;patient/family education  -MS           User Key  (r) = Recorded By, (t) = Taken By, (c) = Cosigned By    Initials Name Provider Type    MS Rajan Abdullahi, PT Physical Therapist               Clinical Impression     Row Name 05/03/22 1202          Pain    Pretreatment Pain Rating 0/10 - no pain  -MS     Posttreatment Pain Rating 0/10 - no pain  -MS     Pain Intervention(s) Repositioned  -MS     Row Name 05/03/22 1202          Plan of Care Review    Plan of Care Reviewed With patient  -MS     Progress no change  -MS     Outcome Evaluation Pt participated in PT eval this date. Pt required modA for sup-sit transfer. Pt was able to maintain static sitting balance with CGA. Pt stood with Rodriguez x2 and Rwx. Pt transferred to the chair Rodriguez x2.  Pt is expected to benefit from skilled PTx during this inpatient stay to address deficits in strength, mobility and transfers.  -MS     Row Name 05/03/22 1202          Therapy Assessment/Plan (PT)    Patient/Family Therapy Goals Statement (PT) to go home with home health  -MS     Rehab Potential (PT) good, to achieve stated therapy goals  -MS     Criteria for Skilled Interventions Met (PT) yes;meets criteria  -MS     Therapy Frequency (PT) 5 times/wk  -MS     Row Name 05/03/22 1202          Vital Signs    Posttreatment Heart Rate (beats/min) 92  -MS     O2 Delivery Pre Treatment supplemental O2  2L  -MS     O2 Delivery Intra Treatment supplemental O2  2L  -MS     Post SpO2 (%) 95  -MS     O2 Delivery Post Treatment supplemental O2  2L  -MS     Pre Patient Position Supine  -MS     Intra Patient Position Standing  -MS     Post Patient Position Sitting  -MS     Row Name 05/03/22 1202          Positioning and Restraints    Pre-Treatment Position in bed  -MS     Post Treatment Position chair  -MS     In Chair call light within reach;encouraged to call for assist;exit alarm on;with family/caregiver  -MS           User Key  (r) = Recorded By, (t) = Taken By, (c) = Cosigned By    Initials Name Provider Type    Rajan Guthrie, PT Physical Therapist               Outcome Measures     Row Name 05/03/22 1214          How much help from another person do you currently need...    Turning from your back to your side while in flat bed without using bedrails? 2  -MS     Moving from lying on back to sitting on the side of a flat bed without bedrails? 2  -MS     Moving to and from a bed to a chair (including a wheelchair)? 3  -MS     Standing up from a chair using your arms (e.g., wheelchair, bedside chair)? 3  -MS     Climbing 3-5 steps with a railing? 1  -MS     To walk in hospital room? 1  -MS     AM-PAC 6 Clicks Score (PT) 12  -MS     Highest level of mobility 4 --> Transferred to chair/commode  -MS     Row Name 05/03/22 1213           Functional Assessment    Outcome Measure Options AM-PAC 6 Clicks Basic Mobility (PT)  -MS           User Key  (r) = Recorded By, (t) = Taken By, (c) = Cosigned By    Initials Name Provider Type    MS Rajan Abdullahi PT Physical Therapist                             Physical Therapy Education                 Title: PT OT SLP Therapies (In Progress)     Topic: Physical Therapy (In Progress)     Point: Mobility training (Done)     Learning Progress Summary           Patient Acceptance, E, VU by MS at 5/3/2022 1215    Comment: importance of mobility                   Point: Home exercise program (Done)     Learning Progress Summary           Patient Acceptance, E, VU by MS at 5/3/2022 1215    Comment: importance of mobility                   Point: Body mechanics (Not Started)     Learner Progress:  Not documented in this visit.          Point: Precautions (Not Started)     Learner Progress:  Not documented in this visit.                      User Key     Initials Effective Dates Name Provider Type Discipline    MS 03/23/22 -  Rajan Abdullahi PT Physical Therapist PT              PT Recommendation and Plan  Planned Therapy Interventions (PT): balance training, bed mobility training, gait training, home exercise program, ROM (range of motion), stair training, strengthening, prosthetic fitting/training, transfer training, stretching, patient/family education  Plan of Care Reviewed With: patient  Progress: no change  Outcome Evaluation: Pt participated in PT eval this date. Pt required modA for sup-sit transfer. Pt was able to maintain static sitting balance with CGA. Pt stood with Rodriguez x2 and Rwx. Pt transferred to the chair Rodriguez x2. Pt is expected to benefit from skilled PTx during this inpatient stay to address deficits in strength, mobility and transfers.     Time Calculation:    PT Charges     Row Name 05/03/22 1216             Time Calculation    Start Time 1022  -MS      PT Received On 05/03/22  -MS       PT Goal Re-Cert Due Date 05/13/22  -MS              Untimed Charges    PT Eval/Re-eval Minutes 45  -MS              Total Minutes    Untimed Charges Total Minutes 45  -MS       Total Minutes 45  -MS            User Key  (r) = Recorded By, (t) = Taken By, (c) = Cosigned By    Initials Name Provider Type    Rajan Guthrie, PT Physical Therapist              Therapy Charges for Today     Code Description Service Date Service Provider Modifiers Qty    06021042002 HC PT EVAL MOD COMPLEXITY 3 5/3/2022 Rajan Abdullahi PT GP 1          PT G-Codes  Outcome Measure Options: AM-PAC 6 Clicks Basic Mobility (PT)  AM-PAC 6 Clicks Score (PT): 12    Rajan Abdullahi PT  5/3/2022

## 2022-05-03 NOTE — PROGRESS NOTES
[facility]    Name:  Millicent Mckeon   Age:  61 y.o.  Sex:  female  :  1960  MRN:  [id number]   Visit Number:  [id number]  Admission Date:  2022  Date Of Service:  22  Primary Care Physician:  Marianela Albright APRN     LOS: 1 day :    Chief Complaint:      Hematemesis    Subjective:    Patient seen and evaluated this morning at bedside.  Admission notes and previous provider notes reviewed.  She denies any vomiting or diarrhea on exam.  Discussed that she will have dialysis today and Dr. Leone will come by to see her.  Patient advised that she would like to stay until she can get her dialysis on Wednesday as well.  Patient denies any pain on exam or any other problems.  States her legs always look the way they do with sores and redness.      Hospital Course:    Ms. Mckeon is a 61 year old chronically ill lady with history of atrial fibrillation, chronic systolic heart failure, end-stage renal disease on dialysis (MWF), chronic cor pulmonale, morbid obesity, functional quadriplegia who is wheelchair-bound was brought to the emergency room by EMS with symptoms of coffee-ground emesis.  Patient was seen and examined in the emergency room and the history is obtained from the patient as well as the medical chart.  Patient states that she started having vomiting on Friday following her dialysis (today being Monday).  She subsequently developed coffee-ground emesis yesterday along with some abdominal pain.  Her brother Ted came and checked on her and subsequently called ambulance to bring her to the emergency room.     In the emergency room, she was afebrile but tachycardic at 107 with a blood pressure of 173/110.  Initial pulse oxygen saturation was 99% on room air but she subsequently dropped her saturations into the mid 80s requiring liters of nasal cannula oxygen.  Blood work done in the emergency room was remarkable for a glucose of 217, sodium 133,  BUN 53, creatinine 4.59, WBC 13.4, hemoglobin 11.8 and platelet count 130.  Patient was given IV Protonix, insulin and antiemetics in the emergency room.  Her case was discussed with Dr. Chance from gastroenterology and the patient is subsequently being admitted to the medical floor with telemetry for further evaluation and management.    Review of Systems:     All systems were reviewed and negative except as mentioned in subjective, assessment and plan.    Vital Signs:    Temp:  [98.2 °F (36.8 °C)-99.2 °F (37.3 °C)] 98.7 °F (37.1 °C)  Heart Rate:  [] 104  Resp:  [18] 18  BP: (110-139)/(61-94) 128/81    Intake and output:    I/O last 3 completed shifts:  In: 350 [P.O.:300; I.V.:50]  Out: -   No intake/output data recorded.    Physical Examination:    General Appearance:  Alert and cooperative, pleasant middle aged female, no acute distress on exam found resting in bed   Head:  Atraumatic and normocephalic.   Eyes: Conjunctivae and sclerae normal, no icterus. No pallor.   Throat: No oral lesions, no thrush, oral mucosa moist.   Neck: Supple, trachea midline   Lungs:   Breath sounds heard bilaterally equally.  No wheezing or crackles. Unlabored on room air   Heart:  Normal S1 and S2, Irregular, no murmur, no gallop, no rub. No JVD.   Abdomen:   Normal bowel sounds, no masses, no organomegaly. Soft, nontender, nondistended, no rebound tenderness, obese with large pannus   Extremities: Supple, lower extremity edema present with scabs and chronic venous stasis changes and warmth bilaterally, no cyanosis, no clubbing.   Skin: No bleeding or rash.   Neurologic: Alert and oriented x 3. No facial asymmetry. Moves all four limbs.      Laboratory results:    Results from last 7 days   Lab Units 05/03/22  0612 05/02/22  1000 05/01/22  1709   SODIUM mmol/L 134* 133* 128*   POTASSIUM mmol/L 3.9 3.8 3.9   CHLORIDE mmol/L 92* 91* 85*   CO2 mmol/L 27.4 29.9* 23.1   BUN mg/dL 62* 53* 45*   CREATININE mg/dL 5.11* 4.59* 4.28*    CALCIUM mg/dL 9.7 10.1 11.0*   BILIRUBIN mg/dL  --  0.4 0.8   ALK PHOS U/L  --  182* 213*   ALT (SGPT) U/L  --  15 21   AST (SGOT) U/L  --  19 27   GLUCOSE mg/dL 222* 217* 417*     Results from last 7 days   Lab Units 05/03/22  0612 05/02/22  1000 05/01/22  2232 05/01/22  1709   WBC 10*3/mm3 10.78 13.41*  --  17.21*   HEMOGLOBIN g/dL 11.2* 11.8* 12.1 12.4   HEMATOCRIT % 36.1 37.9 38.2 38.1   PLATELETS 10*3/mm3 116* 130*  --  130*     Results from last 7 days   Lab Units 05/01/22  1709   INR  1.18*         Results from last 7 days   Lab Units 05/01/22  1718   BLOODCX  No growth at 24 hours  No growth at 24 hours         I have reviewed the patient's laboratory results.    Radiology results:    CT Abdomen Pelvis Without Contrast    Result Date: 5/1/2022  FINAL REPORT TECHNIQUE: Axial CT images were performed from the lung bases through the symphysis pubis without IV contrast.  This study was performed with techniques to keep radiation doses as low as reasonably achievable (ALARA). Individualized dose reduction techniques using automated exposure control or adjustment of mA and/or kV according to the patient's size were employed. CLINICAL HISTORY: abd pain, hematemesis FINDINGS: Lack of intravenous contrast limits evaluation of solid abdominal and pelvic organs.  LOWER CHEST: There is cardiomegaly and coronary artery disease.  The lung bases are clear. ABDOMEN/PELVIS:  Liver, gallbladder and bile ducts: The unenhanced liver is grossly unremarkable without focal abnormality.  The gallbladder is unremarkable.  There is no definite biliary duct dilatation.  Adrenal glands: The adrenal glands are morphologically unremarkable without suspicious lesion.  Kidneys, ureter and urinary bladder: No nephrolithiasis.  No hydronephrosis.  Urinary bladder is unremarkable.  Spleen: The spleen is normal size.  Pancreas: The pancreas is atrophied.  GI systems and mesentery: There is a small hiatal hernia.  No evidence of bowel  obstruction.  The appendix is not visualized.    No significant mesenteric inflammation.  Lymph nodes: No definite pathologically enlarged abdominal or pelvic lymph nodes present within the limits of this noncontrast enhanced exam.  Vessels: The abdominal aorta is normal in caliber.  The inferior vena cava is unremarkable. Peritoneum: No free intraperitoneal fluid or pneumoperitoneum. Pelvic viscera: No acute findings.  Body wall: There is nonspecific subcutaneous inflammation of the abdominal pannus. Cellulitis is not excluded.  Bones: No acute fracture.     Impression: No acute findings in the abdomen and pelvis to account for patient's symptoms.   Nonspecific edema of the abdominal pannus, cellulitis is possible. Authenticated by Jonathan Garay MD on 05/01/2022 06:07:48 PM    I have reviewed the patient's radiology reports.    Medication Review:     I have reviewed the patient's active and prn medications.     Problem List:      Coffee ground emesis    Anemia, chronic disease    Essential hypertension    Acquired hypothyroidism    Type 2 diabetes mellitus with nephropathy (HCC)    Impaired functional mobility and activity tolerance    Chronic diastolic congestive heart failure (HCC)    Pulmonary hypertension (HCC)    Cor pulmonale, chronic (HCC)    Esophagitis    End stage renal disease on dialysis (HCC)      Assessment:    1. Acute upper GI bleed likely secondary to peptic ulcer disease, POA  2. End-stage renal disease on hemodialysis (MWF).  3. Chronic systolic heart failure with ejection fraction of 35%.  4. Paroxysmal atrial fibrillation currently off apixaban due to GI bleed.  5. Chronic cor pulmonale.  6. Chronic hypoxic respiratory failure on home oxygen.  7. Diabetes mellitus type 2 with nephropathy.  8. Chronic venous insufficiency of the lower extremities.  9. Morbid obesity with a BMI of 44.  10. Anemia of chronic kidney disease.  11. Functional quadriplegia.  12. Acquired  hypothyroidism.      Plan:       Upper GI bleed/peptic ulcer disease.  - Patient is currently hemodynamically stable and did undergo upper GI endoscopy.  - She was noted to have esophageal ulcers and esophagitis without any active bleeding.  - Dr. Chance felt that the patient may have underlying gastroparesis from diabetes and has recommended metoclopramide.  - Hold aspirin and Eliquis for 1 week and then may restart Eliquis.  - Continue to monitor H&H and transfuse as necessary.     End-stage renal disease.  - Appreciate Dr. Cutler consulting and making recommendations  - Dialysis scheduled for today and possibly tomorrow with d/c home tomorrow.     Diabetes mellitus type 2/hypertension.  -Continue home medications.  -Continue Levemir 10 units daily.  -She will be placed on subcutaneous insulin protocol for coverage.  -Hemoglobin A1c 8.5 on 3/4/2022.    Bilateral Lower Extremity Cellulitis  -Will start Doxycycline 100mg PO BID for 7 days    DVT Prophylaxis: SCDs  Code Status: DNR/DNI  Diet: Renal Diabetic  Discharge Plan: Home after dialysis tomorrow    Sidra Johnston, APRN  05/03/22  12:57 EDT    Dictated utilizing Dragon dictation.

## 2022-05-03 NOTE — PROGRESS NOTES
In Patient Consult      Date of Consultation: May 3, 2022  Patient Name: Millicent Mckeon  MRN: [id number]  : 1960     Referring provider: Samson Reyes MD    Primary care provider:  Marianela Albright APRN    Reason for consultation: Nausea vomiting and coffee-ground emesis    Interval history:   5/3/2022  Patient denies abdominal pain today. Discussed yesterday's EGD findings with her. She does answer questions briefly. No vomiting today.     2022  This is a 61-year-old female patient with a significant medical comorbidities including end-stage renal disease on hemodialysis, chronic atrial fibrillation, hypertension, chronic systolic heart failure, cor pulmonale, obesity, diabetes mellitus, functional quadriplegia, recent history of bilateral pneumonia, chronic anemia was seen in emergency room on 2022 with complaints of persistent nausea vomiting and coffee-ground material in the vomitus since dialysis on Friday.     Patient was recently discharged from the hospital in 2022 after she was admitted for a pneumonia and multiple skin wounds.  As per record she was discharged to palliative care.  She was initiated with hemodialysis in her last admission and was getting hemodialysis on  and Friday.  As per patient she has been having recurrent multiple episodes of nausea vomiting since Friday.  She noted flecks of blood blood in the brown and minimal coffee-ground material intermittently.  She also has a associated diffuse abdominal pain.  No fever chills.  Prior history of any upper GI bleed.  No diarrhea or blood in the stool.     Patient was supposed to be on aspirin and the Eliquis.  Not sure when she had her last dose.  No record of any recent EGD or colonoscopy.     She was evaluated emergency room and had a CT abdomen pelvis done which did not reveal any acute abdominal pathology except possible abdominal wall edema, cellulitis.  Her hemoglobin was stable at 12 g/dL.  Her  platelets were low at 130,000.  Her glucose was 217.  BUN was 53 creatinine 4.5.  Liver enzymes were normal except elevated alkaline phosphatase 182.  She had a borderline WBC is 13,000.  Her PT/INR was 1.18.    Subjective     Past Medical History:   Diagnosis Date   • A-fib (HCC)    • Anemia 10/2/2018   • Diabetes mellitus (HCC)    • Disease of thyroid gland    • GERD (gastroesophageal reflux disease)    • Gout    • History of transfusion    • Hypertension        Past Surgical History:   Procedure Laterality Date   • ENDOSCOPY N/A 5/2/2022    Procedure: ESOPHAGOGASTRODUODENOSCOPY WITH BIOPSY;  Surgeon: Jacob Chance MD;  Location: [facility] ENDOSCOPY;  Service: Gastroenterology;  Laterality: N/A;   • EYE SURGERY     • INCISION AND DRAINAGE LEG Right 1/14/2019    Procedure: Right heel incision and drainage with graft application;  Surgeon: Ar Rudea DPM;  Location: [facility] OR;  Service: Podiatry   • INSERTION HEMODIALYSIS CATHETER N/A 4/5/2022    Procedure: HEMODIALYSIS CATHETER INSERTION;  Surgeon: Jean Carlos Guadalupe MD;  Location: [facility] OR;  Service: General;  Laterality: N/A;   • LEG SURGERY         Family History   Problem Relation Age of Onset   • Asthma Mother    • Hypertension Father    • Stroke Father        Social History     Socioeconomic History   • Marital status: Single   Tobacco Use   • Smoking status: Never Smoker   • Smokeless tobacco: Never Used   Vaping Use   • Vaping Use: Never used   Substance and Sexual Activity   • Alcohol use: No   • Drug use: No   • Sexual activity: Defer         Current Facility-Administered Medications:   •  acetaminophen (TYLENOL) tablet 650 mg, 650 mg, Oral, Q4H PRN **OR** acetaminophen (TYLENOL) 160 MG/5ML solution 650 mg, 650 mg, Oral, Q4H PRN **OR** acetaminophen (TYLENOL) suppository 650 mg, 650 mg, Rectal, Q4H PRN, Samson Reyes MD  •  b complex-vitamin c-folic acid (NEPHRO-LOIS) tablet 1 tablet, 1 tablet, Oral, Daily, Samson Reyes MD, 1 tablet at  05/02/22 1741  •  docusate sodium (COLACE) capsule 100 mg, 100 mg, Oral, BID, Samson Reyes MD  •  escitalopram (LEXAPRO) tablet 10 mg, 10 mg, Oral, Daily, Samson Reyes MD, 10 mg at 05/02/22 1742  •  insulin detemir (LEVEMIR) injection 10 Units, 10 Units, Subcutaneous, Daily, Samson Reyes MD  •  isosorbide mononitrate (IMDUR) 24 hr tablet 30 mg, 30 mg, Oral, Daily, Samson Reyes MD, 30 mg at 05/02/22 1740  •  levothyroxine (SYNTHROID, LEVOTHROID) tablet 150 mcg, 150 mcg, Oral, Daily, Samson Reyes MD, 150 mcg at 05/02/22 1743  •  lidocaine (LIDODERM) 5 % 1 patch, 1 patch, Transdermal, Q24H, Samson Reyes MD, 1 patch at 05/02/22 1744  •  metoclopramide (REGLAN) injection 5 mg, 5 mg, Intravenous, Q6H, Jacob Chance MD, 5 mg at 05/03/22 0445  •  metoprolol succinate XL (TOPROL-XL) 24 hr tablet 50 mg, 50 mg, Oral, Daily, Samson Reyes MD, 50 mg at 05/02/22 1741  •  ondansetron (ZOFRAN) injection 4 mg, 4 mg, Intravenous, Q6H PRN, Samson Reyes MD  •  pantoprazole (PROTONIX) injection 40 mg, 40 mg, Intravenous, BID AC, Jacob Chance MD, 40 mg at 05/03/22 0656  •  sodium chloride 0.9 % flush 10 mL, 10 mL, Intravenous, PRN, Jacob Chance MD  •  [COMPLETED] Insert peripheral IV, , , Once **AND** sodium chloride 0.9 % flush 10 mL, 10 mL, Intravenous, PRN, Jacob Chance MD  •  sodium chloride 0.9 % flush 3 mL, 3 mL, Intravenous, Q12H, Samson Reyes MD, 3 mL at 05/02/22 2159  •  sodium chloride 0.9 % flush 3-10 mL, 3-10 mL, Intravenous, PRN, Samson Reyes MD  •  sodium chloride 0.9 % infusion, 70 mL/hr, Intravenous, Continuous PRN, Jacob Chance MD  •  sucralfate (CARAFATE) tablet 1 g, 1 g, Oral, 4x Daily AC & at Bedtime, Jacob Chance MD, 1 g at 05/03/22 0656    Allergies   Allergen Reactions   • Metformin And Related Anaphylaxis     HA, diarrhea, throat swelling   • Metformin GI Intolerance       Review of Systems   Gastrointestinal: Positive for nausea. Negative for  abdominal pain and vomiting.   Brief ROS due to patient fatigue.    The following portions of the patient's history were reviewed and updated as appropriate: allergies, current medications, past family history, past medical history, past social history, past surgical history and problem list.    Objective     Vitals:    05/02/22 2122 05/02/22 2334 05/03/22 0318 05/03/22 0700   BP: 110/65 117/62 120/74 133/78   BP Location: Right arm Right arm Right arm Right arm   Patient Position: Lying Lying Lying Lying   Pulse: 96 96 100 104   Resp: 18 18 18 18   Temp:  98.7 °F (37.1 °C) 98.4 °F (36.9 °C) 99.2 °F (37.3 °C)   TempSrc:  Oral Oral Oral   SpO2: 96% 98%  96%   Weight:       Height:           Physical Exam   Vitals and nursing note reviewed.   Constitutional:       Appearance: She is well-developed. She is obese. She is sleeping on her left side but awakens with verbal stimuli.   HENT: NC oxygen present     Head: Normocephalic and atraumatic.      Right Ear: External ear normal.      Left Ear: External ear normal.   Eyes:      Comments: Mild pallor present   Neck:      Thyroid: No thyromegaly.      Trachea: No tracheal deviation.   Cardiovascular:      Rate and Rhythm: Normal rate and regular rhythm.      Heart sounds: No murmur heard.  Pulmonary:      Effort: Pulmonary effort is normal. No respiratory distress.      Breath sounds: Normal breath sounds.      Comments: Right-sided chest with hemodialysis tunneled catheter noted  Abdominal:      General: Bowel sounds are normal. There is no distension.      Palpations: Abdomen is soft. There is no mass.      Tenderness: There is no abdominal tenderness. There is no guarding or rebound.      Hernia: No hernia is present.      Comments: Minimal abdominal wall edema noted   Musculoskeletal:         Right lower leg: Edema (Minimal) present.      Left lower leg: Edema (Minimal) present.   Skin:     General: Skin is warm and dry.      Comments: Multiple skin ulcerations with a  scab noted in the lower extremity on both sides   Neurological:      Mental Status: She is alert.  Psychiatric:         Mood and Affect: flat affect    Results from last 7 days   Lab Units 05/03/22  0612 05/02/22  1000 05/01/22  2232 05/01/22  1709   SODIUM mmol/L 134* 133*  --  128*   POTASSIUM mmol/L 3.9 3.8  --  3.9   CHLORIDE mmol/L 92* 91*  --  85*   CO2 mmol/L 27.4 29.9*  --  23.1   BUN mg/dL 62* 53*  --  45*   CREATININE mg/dL 5.11* 4.59*  --  4.28*   CALCIUM mg/dL 9.7 10.1  --  11.0*   ALBUMIN g/dL  --  3.50  --  4.10   BILIRUBIN mg/dL  --  0.4  --  0.8   ALK PHOS U/L  --  182*  --  213*   ALT (SGPT) U/L  --  15  --  21   AST (SGOT) U/L  --  19  --  27   GLUCOSE mg/dL 222* 217*  --  417*   WBC 10*3/mm3 10.78 13.41*  --  17.21*   HEMOGLOBIN g/dL 11.2* 11.8* 12.1 12.4   PLATELETS 10*3/mm3 116* 130*  --  130*   INR   --   --   --  1.18*     CTAP without contrast 5/1/2022:  FINDINGS:  Lack of intravenous contrast limits evaluation of solid  abdominal and pelvic organs.  LOWER CHEST: There is cardiomegaly  and coronary artery disease.  The lung bases are clear.  ABDOMEN/PELVIS:  Liver, gallbladder and bile ducts: The  unenhanced liver is grossly unremarkable without focal  abnormality.  The gallbladder is unremarkable.  There is no  definite biliary duct dilatation.  Adrenal glands: The adrenal  glands are morphologically unremarkable without suspicious  lesion.  Kidneys, ureter and urinary bladder: No  nephrolithiasis.  No hydronephrosis.  Urinary bladder is  unremarkable.  Spleen: The spleen is normal size.  Pancreas: The  pancreas is atrophied.  GI systems and mesentery: There is a  small hiatal hernia.  No evidence of bowel obstruction.  The  appendix is not visualized.    No significant mesenteric  inflammation.  Lymph nodes: No definite pathologically enlarged  abdominal or pelvic lymph nodes present within the limits of  this noncontrast enhanced exam.  Vessels: The abdominal aorta is  normal in caliber.   The inferior vena cava is unremarkable.  Peritoneum: No free intraperitoneal fluid or pneumoperitoneum.  Pelvic viscera: No acute findings.  Body wall: There is  nonspecific subcutaneous inflammation of the abdominal pannus.  Cellulitis is not excluded.  Bones: No acute fracture.  IMPRESSION:  No acute findings in the abdomen and pelvis to account for  patient's symptoms.   Nonspecific edema of the abdominal pannus,  cellulitis is possible.    EGD was completed by Dr. Chance on 5/2/2022:  - The oropharynx was normal.  - The Z-line was irregular and was found 35 cm from the incisors.  - LA Grade C (one or more mucosal breaks continuous between tops of 2 or more mucosal folds, less than 75% circumference) esophagitis with no bleeding was found in the lower third of the esophagus.  - Few superficial esophageal ulcers were found in the lower third of the esophagus.  - Bilious fluid was found in the stomach.  - Diffuse mildly erythematous mucosa without bleeding was found in the gastric body, in the gastric antrum and in the prepyloric region of the stomach. Biopsies were taken with a cold forceps for Helicobacter pylori testing.  - The duodenal bulb, first portion of the duodenum and second portion of the duodenum were normal.  Pathology pending.     Assessment / Plan    Assessment/Recommendations:  1.  Nausea vomiting  2.  GERD with esophagitis, LA grade C  3.  Esophageal ulcers  4.  Diabetic gastroparesis  5.  Acute on chronic normocytic anemia  6.  Long-term anticoagulation  She presented 2 days ago with nausea and coffee ground emesis that had been occurring for several days prior to admission. Was having associated abdominal pain which was diffuse as well. No vomiting today. Abdominal pain improved. Her Eliquis has been held. She had EGD yesterday by Dr. Chance and those results discussed with her this morning. Hgb has been stable. No current bleeding seen on EGD.     EGD 5/2/2022 showed LA grade C esophagitis,  few esophageal ulcers, bilious fluid in the stomach, diffuse erythema in the stomach and normal duodenum. Pathology pending from biopsies.     Patient likely has diabetic gastroparesis  Clear liquid diet   Reglan 5mg IV TID for 1 more day  Reglan 5mg po TID x 2-4 weeks on discharge  Protonix 40mg IV BID for 24 hours after EGD, then change to PO BID x 8 weeks followed by Protonix 40 mg once daily  Consider holding ASA/Eliquis for 1-2 weeks to enhance the esophageal ulcer healing  Repeat upper endoscopy in 6 months for surveillance  Await pathology results, arrange outpatient GI follow up in 8 weeks         Thank you very much for letting me participate in the care of this patient.  Please do not hesitate to call me if you have any questions.    Adrianne Junior PA-C  [facility]  5/3/2022  08:34 EDT    Please note that portions of this note may have been completed with a voice recognition program.

## 2022-05-03 NOTE — CASE MANAGEMENT/SOCIAL WORK
"Discharge Planning Assessment  [facility]     Patient Name: Millicent Mckeon  MRN: [id number]  Today's Date: 5/3/2022    Admit Date: 5/1/2022     Discharge Needs Assessment     Row Name 05/03/22 1355       Living Environment    People in Home alone    Unique Family Situation Brother Adolfo Mckeon lives nearby. He gets her groceries, pays her bills and checks on her.    Current Living Arrangements home    Duration at Residence 63 years. \"I lived here all my life\".    Potentially Unsafe Housing Conditions other (see comments)  Her Husky barks loudly and will sometimes jump up on people. Not aggressive per pt.    Primary Care Provided by self;other (see comments)  Adolfo Mckeon/brother gets her groceries and pays her bills.    Provides Primary Care For no one, unable/limited ability to care for self;pet(s)    Family Caregiver if Needed none;other (see comments)  Adolfo Mckeon assists with groceries and paying bills.    Quality of Family Relationships supportive    Able to Return to Prior Arrangements yes    Living Arrangement Comments Pt plans to return home [facility]. Nurse assists with dressing changes, PT/OT for strength and balance       Resource/Environmental Concerns    Resource/Environmental Concerns none    Transportation Concerns none;other (see comments)  Brother gets her groceries. [employer] taxi takes to dialysis. Niece Val Cornell is POA.       Transition Planning    Patient/Family Anticipates Transition to home;home with help/services  Continuance of home health per pt.    Patient/Family Anticipated Services at Transition home health care    Transportation Anticipated other (see comments)  [facility] to provide. [facility] \"takes me home\".       Discharge Needs Assessment    Readmission Within the Last 30 Days current reason for admission unrelated to previous admission  Admitted 3/30/22-4/14/22 for sepsis/hypoglycemia. Current admission " "for coffee ground emesis.    Current Outpatient/Agency/Support Group homecare agency  [facility]; [facility] for dialysis, [facility] for transportation.    Equipment Currently Used at Home wheelchair;walker, rolling;commode;oxygen  unsure of O2 DME, \"it's in [place]\". Read list of O2 suppliers, did not recognize names.    Concerns to be Addressed denies needs/concerns at this time    Anticipated Changes Related to Illness none    Equipment Needed After Discharge none    Discharge Facility/Level of Care Needs home with home health  [facility] current  agency    Provided Post Acute Provider List? N/A    N/A Provider List Comment [facility] Ibanez current provider    Current Discharge Risk chronically ill;lives alone               Discharge Plan     Row Name 05/03/22 1413       Plan    Plan Spoke to pt at bedside. Permission granted to speak to nikavita Neal Aneta/JOAQUIN/[phone] and brother Adolfo Mckeon/[phone]. Alert, oriented, answers questions appropriately. Has POA (Val Cornell), denies LW. Confirmed address [address] [place] [address], [place] [address]),ph,emergency contacts,PCP (SNEHA Ibanez). Lives at her childhood home \"all my life\" for 63 years. No steps to house. Uses W/C, rolling walker, oxygen 3 l NC at night, bsc and glucometer. Unsure of supplier, states from [place]. Did not recognize names of DME read from list. Home Health Agency is [facility]. Plans to continue with Nursing/PT/OT with them. Has dialysis M-W-F at [facility] and [facility] transports to dialysis. Pt states independent with all ADLs and IADLs except her brother Deshawn Mckeon does  her grocery shopping and pays her bills. She is concerned regarding her motion sickness that she gets when going to and from dialysis. She would like medication prescribed to help with it. Her family has gotten pills from the store to help; but would like a prescription " "if possible. [facility] and does not want Meds to Beds at this time. She will need [facility] ambulance to transport her home at discharge. She denies any financial help per \"needs assessment\". Denies further discharge needs at this time. Spoke to Val Cornell/JOAQUIN and she agrees with the above assessment and plan of care. She states that pt will need EMS to transfer home and understands there may be a cost for that service. States they will pay.    Provided Post Acute Provider List? N/A    N/A Provider List Comment SNEHA Ibanez is current provider    Provided Post Acute Provider Quality & Resource List? N/A    N/A Quality & Resource List Comment Has [facility] as   provider    Patient/Family in Agreement with Plan yes              Continued Care and Services - Admitted Since 5/1/2022    Coordination has not been started for this encounter.     Selected Continued Care - Prior Encounters Includes selections from prior encounters from 1/31/2022 to 5/3/2022    Discharged on 4/14/2022 Admission date: 3/30/2022 - Discharge disposition: Home-Health Care [facility]    Dialysis/Infusion     Service Provider Selected Services Address Phone Fax Patient Preferred    [facility] [place]  [facility] [address] [facility] [address] [phone] --          Home Medical Care     Service Provider Selected Services Address Phone Fax Patient Preferred    [facility] [address] [place] [address] [address] [address] [phone] --                Discharged on 3/7/2022 Admission date: 3/4/2022 - Discharge disposition: Home-Health Care [facility]     Service Provider Selected Services Address Phone Fax Patient Preferred    [facility] [address] [place] [address] [address] [address] [phone] --       Internal Comment last updated by Tabby, " Kayley IRAHETA RN 3/7/2022 0829    Order faxed manually                                 Expected Discharge Date and Time     Expected Discharge Date Expected Discharge Time    May 5, 2022          Demographic Summary     Row Name 05/03/22 1303       General Information    Admission Type observation    Arrived From home    Required Notices Provided Observation Status Notice  explained to and signed by pt in room 5/3/22    Expected Length of Stay (LOS) 48-72 hours    Referral Source admission list    Reason for Consult discharge planning    Preferred Language English       Contact Information    Permission Granted to Share Info With ;family/designee  Val Cornell/niece/[phone]; Adolfo Mckeon/brother/[phone]               Functional Status     Row Name 05/03/22 1343       Functional Status    Usual Activity Tolerance fair    Current Activity Tolerance fair    Functional Status Comments Pt states she is in good/fair shape. She states she does all her ADL's/IADLs herself except grocery shopping is done by her brother Adolfo.       Functional Status, IADL    Medications independent    Meal Preparation independent    Housekeeping independent    Laundry independent    Shopping assistive person  Adolfo Mckeon/brother    IADL Comments Brother Adolfo Mckeon does her grocery shopping and pays her bills, otherwise, she does everything else herself.       Mental Status    General Appearance WDL WDL       Mental Status Summary    Mental Status Comments Alert, oriented, answers questions appropriately             SOFIA VARNER

## 2022-05-03 NOTE — PLAN OF CARE
Goal Outcome Evaluation:  Plan of Care Reviewed With: patient        Progress: no change  Outcome Evaluation: OT eval completed. Patient presents deficits in strength, endurance, balance, mobility and ADL performance. Patient completed bed mobility with mod A, transfer to chair with min A x 2 using RW, patient requires mod A overall with LB self care and toileting and per patient her brother assists her as needed with this at home. Patient is expected to benefit from continued OT services prior to DC and would benefit from HH services at discharge.

## 2022-05-03 NOTE — PROGRESS NOTES
[facility] Progress Note  [facility]. [place]        Patient Name: Millicent Mckeon  : 1960  MRN: [id number]   LOS: 1 day    Patient Care Team:  Marianela Albright APRN as PCP - General (Family Medicine)  Geremias Shepherd MD as Consulting Physician (General Surgery)  Lisa Cardoso MD as Surgeon (General Surgery)    Chief Complaint:    Chief Complaint   Patient presents with   • Vomiting Blood     Primary Care Physician:  Marianela Albright APRN  Date of admission: 2022    Subjective     Interval History:   Follow-up end-stage renal disease.  Events noted from last 24 hours.  She is sitting up in the chair appears fairly alert in no acute distress.  She is due for dialysis today.  Her both lower extremities are more red as compared to yesterday.  She denies having any fever or chills, no nausea vomiting or diarrhea.    Review of Systems:   As noted above.    Objective     Vitals:   Temp:  [97.3 °F (36.3 °C)-99.3 °F (37.4 °C)] 99.2 °F (37.3 °C)  Heart Rate:  [] 104  Resp:  [13-18] 18  BP: (110-139)/(61-94) 133/78  Flow (L/min):  [2-3.5] 2    Intake/Output Summary (Last 24 hours) at 5/3/2022 0856  Last data filed at 2022  Gross per 24 hour   Intake 350 ml   Output --   Net 350 ml       Physical Exam:    General Appearance: alert, no acute distress   Skin: warm and dry  HEENT: oral mucosa normal, nonicteric sclera  Neck: supple, no JVD  Lungs: CTA, occasional basal crackles.  Heart: IRRR,   Abdomen: Obese, soft, nontender, nondistended  : no palpable bladder  Extremities: 1+ edema, no cyanosis or clubbing.  Both lower extremities are red and warm to touch.  Neuro: normal speech and mental status     Scheduled Meds:     Current Facility-Administered Medications   Medication Dose Route Frequency Provider Last Rate Last Admin   • acetaminophen (TYLENOL) tablet 650 mg  650 mg Oral Q4H PRN Samson Reyes MD        Or   • acetaminophen (TYLENOL) 160 MG/5ML  solution 650 mg  650 mg Oral Q4H PRN Samson Reyes MD        Or   • acetaminophen (TYLENOL) suppository 650 mg  650 mg Rectal Q4H PRN Samson Reyes MD       • b complex-vitamin c-folic acid (NEPHRO-LOIS) tablet 1 tablet  1 tablet Oral Daily Samson Reyes MD   1 tablet at 05/02/22 1741   • docusate sodium (COLACE) capsule 100 mg  100 mg Oral BID Samson Reyes MD       • escitalopram (LEXAPRO) tablet 10 mg  10 mg Oral Daily Samson Reyes MD   10 mg at 05/02/22 1742   • insulin detemir (LEVEMIR) injection 10 Units  10 Units Subcutaneous Daily Samson Reyes MD       • isosorbide mononitrate (IMDUR) 24 hr tablet 30 mg  30 mg Oral Daily Samson Reyes MD   30 mg at 05/02/22 1740   • levothyroxine (SYNTHROID, LEVOTHROID) tablet 150 mcg  150 mcg Oral Daily Samson Reyes MD   150 mcg at 05/02/22 1743   • lidocaine (LIDODERM) 5 % 1 patch  1 patch Transdermal Q24H Samson Reyes MD   1 patch at 05/02/22 1744   • metoclopramide (REGLAN) injection 5 mg  5 mg Intravenous Q6H Jacob Chance MD   5 mg at 05/03/22 0445   • metoprolol succinate XL (TOPROL-XL) 24 hr tablet 50 mg  50 mg Oral Daily Samson Reyes MD   50 mg at 05/02/22 1741   • ondansetron (ZOFRAN) injection 4 mg  4 mg Intravenous Q6H PRN Samson Reyes MD       • pantoprazole (PROTONIX) injection 40 mg  40 mg Intravenous BID AC Jacob Chance MD   40 mg at 05/03/22 0656   • sodium chloride 0.9 % flush 10 mL  10 mL Intravenous PRN Jacob Chance MD       • sodium chloride 0.9 % flush 10 mL  10 mL Intravenous PRN Jacob Chance MD       • sodium chloride 0.9 % flush 3 mL  3 mL Intravenous Q12H Samson Reyes MD   3 mL at 05/02/22 2159   • sodium chloride 0.9 % flush 3-10 mL  3-10 mL Intravenous PRN Samson Reyes MD       • sodium chloride 0.9 % infusion  70 mL/hr Intravenous Continuous PRN Jacob Chance MD       • sucralfate (CARAFATE) tablet 1 g  1 g Oral 4x Daily AC & at Bedtime Jacob Chance MD   1 g at 05/03/22 0656        b complex-vitamin c-folic acid, 1 tablet, Oral, Daily  docusate sodium, 100 mg, Oral, BID  escitalopram, 10 mg, Oral, Daily  insulin detemir, 10 Units, Subcutaneous, Daily  isosorbide mononitrate, 30 mg, Oral, Daily  levothyroxine, 150 mcg, Oral, Daily  lidocaine, 1 patch, Transdermal, Q24H  metoclopramide, 5 mg, Intravenous, Q6H  metoprolol succinate XL, 50 mg, Oral, Daily  pantoprazole, 40 mg, Intravenous, BID AC  sodium chloride, 3 mL, Intravenous, Q12H  sucralfate, 1 g, Oral, 4x Daily AC & at Bedtime        IV Meds:   sodium chloride, 70 mL/hr        Results Reviewed:   I have personally reviewed the results from the time of this admission to 5/3/2022 08:56 EDT     Results from last 7 days   Lab Units 05/03/22  0612 05/02/22 1000 05/01/22  1709   SODIUM mmol/L 134* 133* 128*   POTASSIUM mmol/L 3.9 3.8 3.9   CHLORIDE mmol/L 92* 91* 85*   CO2 mmol/L 27.4 29.9* 23.1   BUN mg/dL 62* 53* 45*   CREATININE mg/dL 5.11* 4.59* 4.28*   CALCIUM mg/dL 9.7 10.1 11.0*   BILIRUBIN mg/dL  --  0.4 0.8   ALK PHOS U/L  --  182* 213*   ALT (SGPT) U/L  --  15 21   AST (SGOT) U/L  --  19 27   GLUCOSE mg/dL 222* 217* 417*       Estimated Creatinine Clearance: 14.6 mL/min (A) (by C-G formula based on SCr of 5.11 mg/dL (H)).                Results from last 7 days   Lab Units 05/03/22  0612 05/02/22  1000 05/01/22 2232 05/01/22  1709   WBC 10*3/mm3 10.78 13.41*  --  17.21*   HEMOGLOBIN g/dL 11.2* 11.8* 12.1 12.4   PLATELETS 10*3/mm3 116* 130*  --  130*       Results from last 7 days   Lab Units 05/01/22  1709   INR  1.18*       Brief Urine Lab Results  (Last result in the past 365 days)      Color   Clarity   Blood   Leuk Est   Nitrite   Protein   CREAT   Urine HCG        03/30/22 1705 Yellow   Clear   Trace   Negative   Negative   >=300 mg/dL (3+)                 No results found for: UTPCR    Imaging Results (Last 24 Hours)     ** No results found for the last 24 hours. **              Assessment / Plan     ASSESSMENT:        End stage renal disease on dialysis (HCC)      Cellulitis of both lower extremities:    Coffee ground emesis    Anemia, chronic disease    Essential hypertension    Acquired hypothyroidism    Type 2 diabetes mellitus with nephropathy (HCC)    Impaired functional mobility and activity tolerance    Chronic diastolic congestive heart failure (HCC)    Pulmonary hypertension (HCC)    Cor pulmonale, chronic (HCC)    Esophagitis        PLAN:  Patient is due for dialysis today, arrangements have been made.  Increase ultrafiltration as tolerated will try 4 to 5 L as tolerated.  We will plan on doing another dialysis tomorrow to get her back in the schedule.  Consider starting her on oral antibiotics for cellulitis.  May benefit from IV antibiotics while she is in the hospital.  Her hemoglobin is fairly stable no signs of active bleed.  Details were discussed with the patient no family in the room.    Details were also discussed with the hospitalist service.   Continue with rest of the current treatment plan, and monitor with surveillance labs.  Further recommendations will depend on clinical course of the patient during the current hospitalization.     Thank you for involving us in the care of Millicent Mckeon.  Please feel free to call with any questions.    Milad Leone MD  05/03/22  08:56 EDT    [facility]  [phone]      Much of this encounter note is an electronic transcription/translation of spoken language to printed text. The electronic translation of spoken language may permit erroneous, or at times, nonsensical words or phrases to be inadvertently transcribed; Although I have reviewed the note for such errors, some may still exist.

## 2022-05-03 NOTE — PLAN OF CARE
Goal Outcome Evaluation:  Plan of Care Reviewed With: patient egd today , no problems noted

## 2022-05-04 ENCOUNTER — READMISSION MANAGEMENT (OUTPATIENT)
Dept: CALL CENTER | Facility: HOSPITAL | Age: 62
End: 2022-05-04

## 2022-05-04 VITALS
DIASTOLIC BLOOD PRESSURE: 73 MMHG | BODY MASS INDEX: 41.06 KG/M2 | SYSTOLIC BLOOD PRESSURE: 109 MMHG | TEMPERATURE: 97.4 F | HEART RATE: 104 BPM | HEIGHT: 66 IN | OXYGEN SATURATION: 96 % | WEIGHT: 255.51 LBS | RESPIRATION RATE: 18 BRPM

## 2022-05-04 LAB
ANION GAP SERPL CALCULATED.3IONS-SCNC: 14.7 MMOL/L (ref 5–15)
BUN SERPL-MCNC: 36 MG/DL (ref 8–23)
BUN/CREAT SERPL: 8.8 (ref 7–25)
CALCIUM SPEC-SCNC: 9.8 MG/DL (ref 8.6–10.5)
CHLORIDE SERPL-SCNC: 93 MMOL/L (ref 98–107)
CO2 SERPL-SCNC: 22.3 MMOL/L (ref 22–29)
CREAT SERPL-MCNC: 4.1 MG/DL (ref 0.57–1)
EGFRCR SERPLBLD CKD-EPI 2021: 11.7 ML/MIN/1.73
GLUCOSE BLDC GLUCOMTR-MCNC: 177 MG/DL (ref 70–130)
GLUCOSE BLDC GLUCOMTR-MCNC: 184 MG/DL (ref 70–130)
GLUCOSE BLDC GLUCOMTR-MCNC: 193 MG/DL (ref 70–130)
GLUCOSE SERPL-MCNC: 169 MG/DL (ref 65–99)
LAB AP CASE REPORT: NORMAL
PATH REPORT.FINAL DX SPEC: NORMAL
POTASSIUM SERPL-SCNC: 4.2 MMOL/L (ref 3.5–5.2)
SODIUM SERPL-SCNC: 130 MMOL/L (ref 136–145)

## 2022-05-04 PROCEDURE — A9270 NON-COVERED ITEM OR SERVICE: HCPCS | Performed by: INTERNAL MEDICINE

## 2022-05-04 PROCEDURE — G0257 UNSCHED DIALYSIS ESRD PT HOS: HCPCS

## 2022-05-04 PROCEDURE — A9270 NON-COVERED ITEM OR SERVICE: HCPCS | Performed by: NURSE PRACTITIONER

## 2022-05-04 PROCEDURE — G0378 HOSPITAL OBSERVATION PER HR: HCPCS

## 2022-05-04 PROCEDURE — 63710000001 B COMPLEX-VITAMIN C-FOLIC ACID 0.8 MG TABLET: Performed by: INTERNAL MEDICINE

## 2022-05-04 PROCEDURE — 80048 BASIC METABOLIC PNL TOTAL CA: CPT | Performed by: NURSE PRACTITIONER

## 2022-05-04 PROCEDURE — 63710000001 METOPROLOL SUCCINATE XL 50 MG TABLET SUSTAINED-RELEASE 24 HOUR: Performed by: INTERNAL MEDICINE

## 2022-05-04 PROCEDURE — 82962 GLUCOSE BLOOD TEST: CPT

## 2022-05-04 PROCEDURE — 63710000001 ISOSORBIDE MONONITRATE 30 MG TABLET SUSTAINED-RELEASE 24 HOUR: Performed by: INTERNAL MEDICINE

## 2022-05-04 PROCEDURE — 25010000002 METOCLOPRAMIDE PER 10 MG: Performed by: INTERNAL MEDICINE

## 2022-05-04 PROCEDURE — 99217 PR OBSERVATION CARE DISCHARGE MANAGEMENT: CPT | Performed by: NURSE PRACTITIONER

## 2022-05-04 PROCEDURE — 63710000001 ESCITALOPRAM 10 MG TABLET: Performed by: INTERNAL MEDICINE

## 2022-05-04 PROCEDURE — 63710000001 LEVOTHYROXINE 150 MCG TABLET: Performed by: INTERNAL MEDICINE

## 2022-05-04 PROCEDURE — 63710000001 DOCUSATE SODIUM 100 MG CAPSULE: Performed by: INTERNAL MEDICINE

## 2022-05-04 PROCEDURE — 63710000001 LIDOCAINE 5 % PATCH: Performed by: INTERNAL MEDICINE

## 2022-05-04 PROCEDURE — 63710000001 INSULIN DETEMIR PER 5 UNITS: Performed by: INTERNAL MEDICINE

## 2022-05-04 PROCEDURE — 63710000001 DOXYCYCLINE 100 MG CAPSULE: Performed by: NURSE PRACTITIONER

## 2022-05-04 PROCEDURE — 63710000001 SUCRALFATE 1 G TABLET: Performed by: INTERNAL MEDICINE

## 2022-05-04 RX ORDER — METOCLOPRAMIDE 5 MG/1
5 TABLET ORAL 3 TIMES DAILY
Qty: 42 TABLET | Refills: 0 | Status: SHIPPED | OUTPATIENT
Start: 2022-05-04 | End: 2022-05-18

## 2022-05-04 RX ORDER — SUCRALFATE 1 G/1
1 TABLET ORAL
Qty: 34 TABLET | Refills: 0 | Status: ON HOLD | OUTPATIENT
Start: 2022-05-04 | End: 2022-05-18

## 2022-05-04 RX ORDER — ALBUMIN (HUMAN) 12.5 G/50ML
12.5 SOLUTION INTRAVENOUS AS NEEDED
Status: DISCONTINUED | OUTPATIENT
Start: 2022-05-04 | End: 2022-05-04 | Stop reason: HOSPADM

## 2022-05-04 RX ORDER — DOXYCYCLINE 100 MG/1
100 CAPSULE ORAL EVERY 12 HOURS SCHEDULED
Qty: 12 CAPSULE | Refills: 0 | Status: SHIPPED | OUTPATIENT
Start: 2022-05-04 | End: 2022-05-18 | Stop reason: HOSPADM

## 2022-05-04 RX ORDER — PANTOPRAZOLE SODIUM 40 MG/1
40 TABLET, DELAYED RELEASE ORAL 2 TIMES DAILY
Qty: 60 TABLET | Refills: 0 | Status: SHIPPED | OUTPATIENT
Start: 2022-05-04 | End: 2022-06-03

## 2022-05-04 RX ORDER — ASPIRIN 81 MG/1
81 TABLET, CHEWABLE ORAL DAILY
Refills: 0
Start: 2022-05-04

## 2022-05-04 RX ORDER — HEPARIN SODIUM 1000 [USP'U]/ML
1000 INJECTION, SOLUTION INTRAVENOUS; SUBCUTANEOUS AS NEEDED
Status: DISCONTINUED | OUTPATIENT
Start: 2022-05-04 | End: 2022-05-04 | Stop reason: HOSPADM

## 2022-05-04 RX ADMIN — SUCRALFATE 1 G: 1 TABLET ORAL at 06:00

## 2022-05-04 RX ADMIN — ESCITALOPRAM OXALATE 10 MG: 10 TABLET ORAL at 08:53

## 2022-05-04 RX ADMIN — PANTOPRAZOLE SODIUM 40 MG: 40 INJECTION, POWDER, FOR SOLUTION INTRAVENOUS at 06:00

## 2022-05-04 RX ADMIN — METOCLOPRAMIDE 5 MG: 5 INJECTION, SOLUTION INTRAMUSCULAR; INTRAVENOUS at 03:00

## 2022-05-04 RX ADMIN — METOPROLOL SUCCINATE 50 MG: 50 TABLET, EXTENDED RELEASE ORAL at 08:53

## 2022-05-04 RX ADMIN — DOXYCYCLINE 100 MG: 100 CAPSULE ORAL at 08:53

## 2022-05-04 RX ADMIN — Medication 3 ML: at 09:01

## 2022-05-04 RX ADMIN — LIDOCAINE 1 PATCH: 50 PATCH CUTANEOUS at 16:19

## 2022-05-04 RX ADMIN — LEVOTHYROXINE SODIUM 150 MCG: 150 TABLET ORAL at 08:53

## 2022-05-04 RX ADMIN — Medication 1 TABLET: at 08:53

## 2022-05-04 RX ADMIN — METOCLOPRAMIDE 5 MG: 5 INJECTION, SOLUTION INTRAMUSCULAR; INTRAVENOUS at 08:53

## 2022-05-04 RX ADMIN — DOCUSATE SODIUM 100 MG: 100 CAPSULE, LIQUID FILLED ORAL at 08:53

## 2022-05-04 RX ADMIN — INSULIN DETEMIR 10 UNITS: 100 INJECTION, SOLUTION SUBCUTANEOUS at 09:01

## 2022-05-04 RX ADMIN — ISOSORBIDE MONONITRATE 30 MG: 30 TABLET, EXTENDED RELEASE ORAL at 08:53

## 2022-05-04 NOTE — DISCHARGE INSTRUCTIONS
You are being discharged home today after regular dialysis session.  You will need to take Reglan three times daily as prescribed.  You will need to take Protonix 40mg twice daily for 8 weeks then once daily after that.  Take Carafate as prescribed.  Take Doxycycline for your skin infection on your legs and complete this medication as well.  Eat small frequent meals.  You may have a soft diet and advance as tolerated.  Follow up with GI in 8 weeks for a recheck.  Follow up with PCP in one week.      HOLD YOUR ASPIRIN AND ELIQUIS FOR ONE WEEK AND THEN RESTART.

## 2022-05-04 NOTE — PROGRESS NOTES
[facility] Progress Note  [facility]. [place]        Patient Name: Millicent Mckeon  : 1960  MRN: [id number]   LOS: 1 day    Patient Care Team:  Marianela Albright APRN as PCP - General (Family Medicine)  Geremias Shepherd MD as Consulting Physician (General Surgery)  Lisa Cardoso MD as Surgeon (General Surgery)    Chief Complaint:    Chief Complaint   Patient presents with   • Vomiting Blood     Primary Care Physician:  Marianela Albright APRN  Date of admission: 2022    Subjective     Interval History:   Follow-up end-stage renal disease.  Events noted from last 24 hours.  She is sitting up in the chair appears fairly alert in no acute distress.  She is due for dialysis today.  Her both lower extremities are more red as compared to yesterday.  She denies having any fever or chills, no nausea vomiting or diarrhea.    Review of Systems:   As noted above.    Objective     Vitals:   Temp:  [98.5 °F (36.9 °C)-99.3 °F (37.4 °C)] 99.3 °F (37.4 °C)  Heart Rate:  [104-113] 113  Resp:  [18] 18  BP: (101-128)/(59-81) 124/70  Flow (L/min):  [2] 2    Intake/Output Summary (Last 24 hours) at 2022 0907  Last data filed at 5/3/2022 1919  Gross per 24 hour   Intake --   Output 5000 ml   Net -5000 ml       Physical Exam:    General Appearance: alert, no acute distress   Skin: warm and dry  HEENT: oral mucosa normal, nonicteric sclera  Neck: supple, no JVD  Lungs: CTA, occasional basal crackles.  Heart: IRRR,   Abdomen: Obese, soft, nontender, nondistended  : no palpable bladder  Extremities: 1+ edema, no cyanosis or clubbing.  Both lower extremities are red and warm to touch.  Neuro: normal speech and mental status     Scheduled Meds:     Current Facility-Administered Medications   Medication Dose Route Frequency Provider Last Rate Last Admin   • acetaminophen (TYLENOL) tablet 650 mg  650 mg Oral Q4H PRN Samson Reyes MD        Or   • acetaminophen (TYLENOL) 160 MG/5ML solution  650 mg  650 mg Oral Q4H PRN Samson Reyes MD        Or   • acetaminophen (TYLENOL) suppository 650 mg  650 mg Rectal Q4H PRN Samson Reyes MD       • albumin human 25 % IV SOLN 12.5 g  12.5 g Intravenous PRN Milad Leone MD       • b complex-vitamin c-folic acid (NEPHRO-LOIS) tablet 1 tablet  1 tablet Oral Daily Samson Reyes MD   1 tablet at 05/04/22 0853   • docusate sodium (COLACE) capsule 100 mg  100 mg Oral BID Samson Reyes MD   100 mg at 05/04/22 0853   • doxycycline (MONODOX) capsule 100 mg  100 mg Oral Q12H Sidra Johnston APRN   100 mg at 05/04/22 0853   • escitalopram (LEXAPRO) tablet 10 mg  10 mg Oral Daily Samson Reyes MD   10 mg at 05/04/22 0853   • heparin (porcine) injection 1,000 Units  1,000 Units Intracatheter PRN Milad Leone MD       • insulin detemir (LEVEMIR) injection 10 Units  10 Units Subcutaneous Daily Samson Reyes MD   10 Units at 05/03/22 0917   • isosorbide mononitrate (IMDUR) 24 hr tablet 30 mg  30 mg Oral Daily Samson Reyes MD   30 mg at 05/04/22 0853   • levothyroxine (SYNTHROID, LEVOTHROID) tablet 150 mcg  150 mcg Oral Daily Samson Reyes MD   150 mcg at 05/04/22 0853   • lidocaine (LIDODERM) 5 % 1 patch  1 patch Transdermal Q24H Samson Reyes MD   1 patch at 05/03/22 2054   • metoprolol succinate XL (TOPROL-XL) 24 hr tablet 50 mg  50 mg Oral Daily Samson Reyes MD   50 mg at 05/04/22 0853   • ondansetron (ZOFRAN) injection 4 mg  4 mg Intravenous Q6H PRN Samson Reyes MD       • pantoprazole (PROTONIX) injection 40 mg  40 mg Intravenous BID Jacob Garcia MD   40 mg at 05/04/22 0600   • sodium chloride 0.9 % bolus 1,000 mL  1,000 mL Intravenous PRN Milad Leone MD       • sodium chloride 0.9 % flush 10 mL  10 mL Intravenous PRN Jacob Chance MD       • sodium chloride 0.9 % flush 10 mL  10 mL Intravenous PRN Jacob Chance MD       • sodium chloride 0.9 % flush 3 mL  3 mL Intravenous Q12H Samson Reyes MD   3 mL at 05/04/22 0901   • sodium  chloride 0.9 % flush 3-10 mL  3-10 mL Intravenous PRN Samson Reyes MD       • sodium chloride 0.9 % infusion  70 mL/hr Intravenous Continuous PRN Jacob Chance MD       • sucralfate (CARAFATE) tablet 1 g  1 g Oral 4x Daily AC & at Bedtime Jacob Chance MD   1 g at 05/04/22 0600       b complex-vitamin c-folic acid, 1 tablet, Oral, Daily  docusate sodium, 100 mg, Oral, BID  doxycycline, 100 mg, Oral, Q12H  escitalopram, 10 mg, Oral, Daily  insulin detemir, 10 Units, Subcutaneous, Daily  isosorbide mononitrate, 30 mg, Oral, Daily  levothyroxine, 150 mcg, Oral, Daily  lidocaine, 1 patch, Transdermal, Q24H  metoprolol succinate XL, 50 mg, Oral, Daily  pantoprazole, 40 mg, Intravenous, BID AC  sodium chloride, 3 mL, Intravenous, Q12H  sucralfate, 1 g, Oral, 4x Daily AC & at Bedtime        IV Meds:   sodium chloride, 70 mL/hr        Results Reviewed:   I have personally reviewed the results from the time of this admission to 5/4/2022 09:07 EDT     Results from last 7 days   Lab Units 05/04/22  0652 05/03/22  0612 05/02/22  1000 05/01/22  1709   SODIUM mmol/L 130* 134* 133* 128*   POTASSIUM mmol/L 4.2 3.9 3.8 3.9   CHLORIDE mmol/L 93* 92* 91* 85*   CO2 mmol/L 22.3 27.4 29.9* 23.1   BUN mg/dL 36* 62* 53* 45*   CREATININE mg/dL 4.10* 5.11* 4.59* 4.28*   CALCIUM mg/dL 9.8 9.7 10.1 11.0*   BILIRUBIN mg/dL  --   --  0.4 0.8   ALK PHOS U/L  --   --  182* 213*   ALT (SGPT) U/L  --   --  15 21   AST (SGOT) U/L  --   --  19 27   GLUCOSE mg/dL 169* 222* 217* 417*       Estimated Creatinine Clearance: 18.2 mL/min (A) (by C-G formula based on SCr of 4.1 mg/dL (H)).                Results from last 7 days   Lab Units 05/03/22  0612 05/02/22  1000 05/01/22  2232 05/01/22  1709   WBC 10*3/mm3 10.78 13.41*  --  17.21*   HEMOGLOBIN g/dL 11.2* 11.8* 12.1 12.4   PLATELETS 10*3/mm3 116* 130*  --  130*       Results from last 7 days   Lab Units 05/01/22  1709   INR  1.18*       Brief Urine Lab Results  (Last result in the  past 365 days)      Color   Clarity   Blood   Leuk Est   Nitrite   Protein   CREAT   Urine HCG        03/30/22 1705 Yellow   Clear   Trace   Negative   Negative   >=300 mg/dL (3+)                 No results found for: UTPCR    Imaging Results (Last 24 Hours)     ** No results found for the last 24 hours. **              Assessment / Plan     ASSESSMENT:       End stage renal disease on dialysis (HCC)      Cellulitis of both lower extremities:    Coffee ground emesis    Anemia, chronic disease    Essential hypertension    Acquired hypothyroidism    Type 2 diabetes mellitus with nephropathy (HCC)    Impaired functional mobility and activity tolerance    Chronic diastolic congestive heart failure (HCC)    Pulmonary hypertension (HCC)    Cor pulmonale, chronic (HCC)    Esophagitis        PLAN:  Patient appears to be doing fair, volume status appears a lot better.  She is scheduled to get her dialysis today.  Clinically appears to be doing much better, if clinically stable post dialysis may be able to go home.  I will see her back at the dialysis clinic next week.  Her hemoglobin is fairly stable no signs of active bleed.  Details were discussed with the patient no family in the room.    Details were also discussed with the hospitalist service.  He will need to go with doxycycline 100 mg twice a day for 10 days for her cellulitis of both lower extremities.  Continue with rest of the current treatment plan, and monitor with surveillance labs.  Further recommendations will depend on clinical course of the patient during the current hospitalization.     Thank you for involving us in the care of Millicent Mckeon.  Please feel free to call with any questions.    Milad Leone MD  05/04/22  09:07 EDT    [facility]  [phone]      Much of this encounter note is an electronic transcription/translation of spoken language to printed text. The electronic translation of spoken language may  permit erroneous, or at times, nonsensical words or phrases to be inadvertently transcribed; Although I have reviewed the note for such errors, some may still exist.

## 2022-05-04 NOTE — PLAN OF CARE
Goal Outcome Evaluation:      Pt had Palliative Care banner visible.   Documentation in FYI revealed PC referral initiated last admission (3/30/2022--4/14/2022) on 4/7/2022 with discharge 4/14/2022.  HCP contacted to confirm pt was admitted to Palliative Care program.  Spoke with Padmini HCP/PC who reported they had contacted daughter who informed them she wanted to speak with pt more about PC before requesting services.     Pt is currently not a Palliative Care pt.  PC banner deleted from EMR.    PC will F/U re: PC referral at [place] this admission.

## 2022-05-04 NOTE — DISCHARGE SUMMARY
[facility]   [facility]      Name:  Millicent Mckeon   Age:  61 y.o.  Sex:  female  :  1960  MRN:  [id number]   Visit Number:  [id number]    Admission Date:  2022  Date of Discharge:  2022  Primary Care Physician:  Marianela Albright APRN    Important issues to note:    1.  Admitted to the hospital with hematemesis.  Consulted by GI and taken for EGD found to have grade C esophagitis and esophageal ulcers with no bleeding.  Also likely diagnosis of diabetic gastroparesis.    2.  GI recommended initiation of Reglan and Protonix and consideration holding ASA and Eliquis for 1-2 weeks to enhance esophageal healing.  Follow up with GI in 8 weeks for recheck.    3.  Patient stayed in hospital to receive hemodialysis and get back on regular M/W/F schedule.  Started on Doxycycline PO for bilateral lower extremity cellulitis.      4.  Stable for discharge home today after dialysis.  Follow up with GI and PCP.    Discharge Diagnoses:     1. Acute upper GI bleed likely secondary to peptic ulcer disease, POA  2. End-stage renal disease on hemodialysis (MWF).  3. Chronic systolic heart failure with ejection fraction of 35%.  4. Paroxysmal atrial fibrillation currently off apixaban due to GI bleed.  5. Bilateral lower extremity cellulitis  6. Chronic cor pulmonale.  7. Chronic hypoxic respiratory failure on home oxygen.  8. Diabetes mellitus type 2 with nephropathy.  9. Chronic venous insufficiency of the lower extremities.  10. Morbid obesity with a BMI of 44.  11. Anemia of chronic kidney disease.  12. Functional quadriplegia.  13. Acquired hypothyroidism.      Problem List:     Active Hospital Problems    Diagnosis  POA   • **Coffee ground emesis [K92.0]  Yes   • Esophagitis [K20.90]  Yes   • End stage renal disease on dialysis (HCC) [N18.6, Z99.2]  Not Applicable   • Chronic diastolic congestive heart failure (HCC) [I50.32]  Yes   • Cor pulmonale, chronic (HCC) [I27.81]  Yes    • Pulmonary hypertension (HCC) [I27.20]  Yes   • Impaired functional mobility and activity tolerance [Z74.09]  Yes   • Acquired hypothyroidism [E03.9]  Yes   • Essential hypertension [I10]  Yes   • Type 2 diabetes mellitus with nephropathy (HCC) [E11.21]  Yes   • Anemia, chronic disease [D63.8]  Yes      Resolved Hospital Problems   No resolved problems to display.     Presenting Problem:    Chief Complaint   Patient presents with   • Vomiting Blood      Consults:     Consulting Physician(s)  Chat With All Active Members    Provider Relationship Specialty    Milad Leone MD  Consulting Physician Nephrology    Jacob Chance MD  Consulting Physician Gastroenterology        Procedures Performed:    Procedure(s):  ESOPHAGOGASTRODUODENOSCOPY WITH BIOPSY    History of presenting illness/Hospital Course:    Patient is a 61 year old chronically ill female with history of atrial fibrillation, chronic systolic heart failure, end-stage renal disease on dialysis (MWF), chronic cor pulmonale, morbid obesity, functional quadriplegia who is wheelchair-bound was brought to the emergency room by EMS 5/1/2022 with symptoms of coffee-ground emesis. Patient was seen and examined in the emergency room and the history was obtained from the patient as well as the medical chart.  Patient stated that she started having vomiting on Friday following her dialysis.  She subsequently developed coffee-ground emesis yesterday along with some abdominal pain.  Her brother, Ted, came and checked on her and subsequently called ambulance to bring her to the emergency room.     In the emergency room, she was afebrile but tachycardic at 107 with a blood pressure of 173/110.  Initial pulse oxygen saturation was 99% on room air but she subsequently dropped her saturations into the mid 80s requiring liters of nasal cannula oxygen.  Blood work done in the emergency room was remarkable for a glucose of 217, sodium 133, BUN 53, creatinine 4.59,  WBC 13.4, hemoglobin 11.8 and platelet count 130.  Patient was given IV Protonix, insulin and antiemetics in the emergency room.  Her case was discussed with Dr. Chance from gastroenterology and was admitted for further workup.    Patient was taken for EGD with Dr. Chance 5/2/2022 with no subsequent episodes of vomiting since arrival to hospital.  GI noted grade C reglux esophagitis with no bleeding and esophageal ulcers with likely diagnosis of diabetic gastroparesis.  GI recommended Reglan 5mg IV TID for 2 days, then PO TID for 2-4 weeks on discharge.  Protonix 40mg IV BID for 24 hours, then PO BID for 8 weeks followed by daily after 8 weeks.  Recommended considering holding ASA/Eliquis for 1-2 weeks to enhance the esophageal ulcer healing.  Repeat upper endoscopy in 6 months.  Follow up with GI office in 8 weeks.      Due to EGD procedure, patient remained in the hospital and received dialysis Tuesday and Wednesday (day of discharge) to get her back on her regular schedule.  Patient was started on Doxycycline PO for bilateral lower leg cellulitis.  Patient stable for discharge home today after dialysis.  Will have patient follow up with GI as recommended.  Follow up with PCP next week for a recheck.  Will have patient restart home ASA and Eliquis in one week.  Protonix and Reglan as recommended by GI with Carafate for total of 10 days.  Return to the hospital with any problems. Will prescribe Doxycycline for total of 7 days for cellulitis treatment.  Stable to discharge home today.    Vital Signs:    Temp:  [98.5 °F (36.9 °C)-99.3 °F (37.4 °C)] 99.3 °F (37.4 °C)  Heart Rate:  [104-113] 113  Resp:  [18] 18  BP: (101-124)/(59-71) 124/70    Physical Exam:    General Appearance:  Alert and cooperative, pleasant middle aged female, no acute distress on exam found resting in bed   Head:  Atraumatic and normocephalic.   Eyes: Conjunctivae and sclerae normal, no icterus. No pallor.   Ears:  Ears with no abnormalities  noted.   Throat: No oral lesions, no thrush, oral mucosa moist.   Neck: Supple, trachea midline   Back:   No kyphoscoliosis present. No tenderness to palpation.   Lungs:   Breath sounds heard bilaterally equally.  No crackles or wheezing. Unlabored on room air   Heart:  Normal S1 and S2, irregular, no murmur, no gallop, no rub. No JVD, dialysis catheter to right chest wall.   Abdomen:   Normal bowel sounds, no masses, no organomegaly. Soft, nontender, nondistended, no rebound tenderness, obese with large pannus   Extremities: Supple, lower extremity edema present with scabs and chronic venous stasis changes and warmth bilaterally,no cyanosis, no clubbing.   Pulses: Pulses palpable bilaterally.   Skin: No bleeding or rash, scabs and ulcerations in various stages of healing on bilateral lower extremities   Neurologic: Alert and oriented x 3. No facial asymmetry. Moves all four limbs.      Pertinent Lab Results:     Results from last 7 days   Lab Units 05/04/22  0652 05/03/22  0612 05/02/22 1000 05/01/22  1709   SODIUM mmol/L 130* 134* 133* 128*   POTASSIUM mmol/L 4.2 3.9 3.8 3.9   CHLORIDE mmol/L 93* 92* 91* 85*   CO2 mmol/L 22.3 27.4 29.9* 23.1   BUN mg/dL 36* 62* 53* 45*   CREATININE mg/dL 4.10* 5.11* 4.59* 4.28*   CALCIUM mg/dL 9.8 9.7 10.1 11.0*   BILIRUBIN mg/dL  --   --  0.4 0.8   ALK PHOS U/L  --   --  182* 213*   ALT (SGPT) U/L  --   --  15 21   AST (SGOT) U/L  --   --  19 27   GLUCOSE mg/dL 169* 222* 217* 417*     Results from last 7 days   Lab Units 05/03/22  0612 05/02/22 1000 05/01/22 2232 05/01/22  1709   WBC 10*3/mm3 10.78 13.41*  --  17.21*   HEMOGLOBIN g/dL 11.2* 11.8* 12.1 12.4   HEMATOCRIT % 36.1 37.9 38.2 38.1   PLATELETS 10*3/mm3 116* 130*  --  130*     Results from last 7 days   Lab Units 05/01/22  1709   INR  1.18*                 Results from last 7 days   Lab Units 05/01/22  1709   LIPASE U/L 21         Results from last 7 days   Lab Units 05/01/22  1718   BLOODCX  No growth at 2 days  No  growth at 2 days       Pertinent Radiology Results:    Imaging Results (All)     Procedure Component Value Units Date/Time    CT Abdomen Pelvis Without Contrast [[id number]] Collected: 05/01/22 1807     Updated: 05/01/22 1911    Narrative:      FINAL REPORT    TECHNIQUE:  Axial CT images were performed from the lung bases through the  symphysis pubis without IV contrast.  This study was performed  with techniques to keep radiation doses as low as reasonably  achievable (ALARA). Individualized dose reduction techniques  using automated exposure control or adjustment of mA and/or kV  according to the patient's size were employed.    CLINICAL HISTORY:  abd pain, hematemesis    FINDINGS:  Lack of intravenous contrast limits evaluation of solid  abdominal and pelvic organs.  LOWER CHEST: There is cardiomegaly  and coronary artery disease.  The lung bases are clear.  ABDOMEN/PELVIS:  Liver, gallbladder and bile ducts: The  unenhanced liver is grossly unremarkable without focal  abnormality.  The gallbladder is unremarkable.  There is no  definite biliary duct dilatation.  Adrenal glands: The adrenal  glands are morphologically unremarkable without suspicious  lesion.  Kidneys, ureter and urinary bladder: No  nephrolithiasis.  No hydronephrosis.  Urinary bladder is  unremarkable.  Spleen: The spleen is normal size.  Pancreas: The  pancreas is atrophied.  GI systems and mesentery: There is a  small hiatal hernia.  No evidence of bowel obstruction.  The  appendix is not visualized.    No significant mesenteric  inflammation.  Lymph nodes: No definite pathologically enlarged  abdominal or pelvic lymph nodes present within the limits of  this noncontrast enhanced exam.  Vessels: The abdominal aorta is  normal in caliber.  The inferior vena cava is unremarkable.  Peritoneum: No free intraperitoneal fluid or pneumoperitoneum.  Pelvic viscera: No acute findings.  Body wall: There is  nonspecific subcutaneous inflammation of the  abdominal pannus.  Cellulitis is not excluded.  Bones: No acute fracture.      Impression:      No acute findings in the abdomen and pelvis to account for  patient's symptoms.   Nonspecific edema of the abdominal pannus,  cellulitis is possible.    Authenticated by Jonathan Garay MD on 05/01/2022 06:07:48 PM          Echo:    Results for orders placed during the hospital encounter of 03/04/22    Adult Transthoracic Echo Complete W/ Cont if Necessary Per Protocol    Interpretation Summary  · The left ventricular cavity is mildly dilated.  · Left ventricular wall thickness is consistent with mild concentric hypertrophy.  · Estimated left ventricular EF = 48% Left ventricular ejection fraction appears to be 46 - 50%. Left ventricular systolic function is low normal.  · Left ventricular diastolic function is consistent with (grade II w/high LAP) pseudonormalization.  · The right atrial cavity is mildly dilated.  · The right ventricular cavity is mildly dilated.  · There is moderate calcification of the aortic valve mainly affecting the non-coronary, left coronary and right coronary cusp(s).  · Moderate to severe tricuspid valve regurgitation is present.  · Estimated right ventricular systolic pressure from tricuspid regurgitation is markedly elevated (>55 mmHg). Calculated right ventricular systolic pressure from tricuspid regurgitation is 63 mmHg.  · Severe pulmonary hypertension is present.    Condition on Discharge:      Stable.    Code status during the hospital stay:    Code Status and Medical Interventions:   Ordered at: 05/02/22 1430     Medical Intervention Limits:    NO intubation (DNI)    NO cardioversion     Level Of Support Discussed With:    Patient     Code Status (Patient has no pulse and is not breathing):    No CPR (Do Not Attempt to Resuscitate)     Medical Interventions (Patient has pulse or is breathing):    Limited Support     Discharge Disposition:    Home or Self Care    Discharge Medications:        Discharge Medications      New Medications      Instructions Start Date   doxycycline 100 MG capsule  Commonly known as: MONODOX   100 mg, Oral, Every 12 Hours Scheduled      metoclopramide 5 MG tablet  Commonly known as: Reglan   5 mg, Oral, 3 Times Daily      sucralfate 1 g tablet  Commonly known as: CARAFATE   1 g, Oral, 4 Times Daily Before Meals & Nightly         Changes to Medications      Instructions Start Date   apixaban 5 MG tablet tablet  Commonly known as: ELIQUIS  What changed: additional instructions   5 mg, Oral, Every 12 Hours Scheduled, Hold for one week at discharge      aspirin 81 MG chewable tablet  What changed: additional instructions   81 mg, Oral, Daily, Hold for one week at discharge      levothyroxine 150 MCG tablet  Commonly known as: SYNTHROID, LEVOTHROID  What changed: Another medication with the same name was removed. Continue taking this medication, and follow the directions you see here.   Take 1 tablet by mouth Daily      metoprolol succinate  MG 24 hr tablet  Commonly known as: TOPROL-XL  What changed: Another medication with the same name was removed. Continue taking this medication, and follow the directions you see here.   Take 1 tablet by mouth Daily      pantoprazole 40 MG EC tablet  Commonly known as: PROTONIX  What changed: Another medication with the same name was added. Make sure you understand how and when to take each.   40 mg, Oral, Daily      pantoprazole 40 MG EC tablet  Commonly known as: Protonix  What changed: You were already taking a medication with the same name, and this prescription was added. Make sure you understand how and when to take each.   40 mg, Oral, 2 Times Daily         Continue These Medications      Instructions Start Date   b complex-vitamin c-folic acid 0.8 MG tablet tablet   Take 1 tablet by mouth Daily      docusate sodium 100 MG capsule  Commonly known as: COLACE   100 mg, Oral, 2 Times Daily      escitalopram 10 MG tablet  Commonly  known as: LEXAPRO   Take 1 tablet by mouth Daily      ferrous sulfate 324 (65 Fe) MG tablet delayed-release EC tablet   324 mg, Oral, 2 Times Daily With Meals      insulin detemir 100 UNIT/ML injection  Commonly known as: LEVEMIR   Inject 10 Units under the skin into the appropriate area as directed Daily      isosorbide mononitrate 30 MG 24 hr tablet  Commonly known as: IMDUR   30 mg, Oral, Daily      Jobst Opaque Knee 20-30mmHg XL misc   1 application, Does not apply, Daily      lactobacillus acidophilus capsule capsule   Take 1 capsule by mouth 2 (Two) Times a Day      lidocaine 5 %  Commonly known as: LIDODERM   1 patch, Transdermal, Every 24 Hours, Apply patch to left knee daily, on at 0700 am off at 2000       QUEtiapine 25 MG tablet  Commonly known as: SEROquel   Take 1/2 tablet by mouth Every Night      torsemide 100 MG tablet  Commonly known as: DEMADEX   Take 1 tablet by mouth Daily           Discharge Diet:     Diet Instructions     Diet: Consistent Carbohydrate, Cardiac      Discharge Diet:  Consistent Carbohydrate  Cardiac           Activity at Discharge:     Activity Instructions     Activity as Tolerated      Gradually Increase Activity Until at Pre-Hospitalization Level          Follow-up Appointments:     Follow-up Information     Marianela Albright APRN Follow up.    Specialty: Family Medicine  Why: Please schedule appointment to be seen next week for follow up prior to d/c  Contact information:  [address] [facility] [address] [phone]             Jacob Chance MD Follow up.    Specialty: Gastroenterology  Why: Please schedule follow up appointment for 8 weeks prior to d/c  Contact information:  [address]  [address]  [facility] [address] [phone]                       No future appointments.  Test Results Pending at Discharge:    Pending Labs     Order Current Status    Blood Culture - Blood, Arm, Right Preliminary result    Blood Culture - Blood, Hand, Left Preliminary  result             Sidra ABBY Johnston, APRN  05/04/22  12:54 EDT    Time: I spent 32 minutes on this discharge activity which included: face-to-face encounter with the patient, reviewing the data in the system, coordination of the care with the nursing staff as well as consultants, documentation, and entering orders.     Dictated utilizing Dragon dictation.

## 2022-05-04 NOTE — CASE MANAGEMENT/SOCIAL WORK
Continued Stay Note  SRINI Hdz     Patient Name: Millicent Mckeon  MRN: [id number]  Today's Date: 5/4/2022    Admit Date: 5/1/2022     Discharge Plan     Row Name 05/04/22 1147       Plan    Plan Stopped by pt's room to check on her, she was not in her room at this time. Will continue to monitor.           Expected Discharge Date and Time     Expected Discharge Date Expected Discharge Time    May 4, 2022             SOFIA VARNER

## 2022-05-04 NOTE — PLAN OF CARE
Goal Outcome Evaluation:              Outcome Evaluation: VSS.  No acute events noted this shift.  Plan is for PT to DC home today after dialysis.

## 2022-05-04 NOTE — PLAN OF CARE
Goal Outcome Evaluation:         Meets criteria for discharge              Problem: Adult Inpatient Plan of Care  Goal: Plan of Care Review  Outcome: Met  Goal: Patient-Specific Goal (Individualized)  Outcome: Met  Goal: Absence of Hospital-Acquired Illness or Injury  Outcome: Met  Intervention: Identify and Manage Fall Risk  Recent Flowsheet Documentation  Taken 5/4/2022 1000 by Evonne Tapia RN  Safety Promotion/Fall Prevention:   safety round/check completed   room organization consistent   nonskid shoes/slippers when out of bed   fall prevention program maintained   clutter free environment maintained   assistive device/personal items within reach   activity supervised  Taken 5/4/2022 0800 by Evonne Tapia RN  Safety Promotion/Fall Prevention:   safety round/check completed   room organization consistent   nonskid shoes/slippers when out of bed   fall prevention program maintained   clutter free environment maintained   assistive device/personal items within reach   activity supervised  Intervention: Prevent Skin Injury  Recent Flowsheet Documentation  Taken 5/4/2022 1000 by Evonne Tapia RN  Body Position: weight shifting  Taken 5/4/2022 0800 by Evonne Tapia RN  Body Position: weight shifting  Skin Protection:   adhesive use limited   tubing/devices free from skin contact   incontinence pads utilized  Intervention: Prevent and Manage VTE (Venous Thromboembolism) Risk  Recent Flowsheet Documentation  Taken 5/4/2022 1000 by Evonne Tapia RN  Activity Management: activity adjusted per tolerance  Taken 5/4/2022 0800 by Evonne Tapia RN  Activity Management: activity adjusted per tolerance  Intervention: Prevent Infection  Recent Flowsheet Documentation  Taken 5/4/2022 1000 by Evonne Tapia RN  Infection Prevention:   environmental surveillance performed   equipment surfaces disinfected   hand hygiene promoted   personal protective equipment utilized   rest/sleep  promoted   single patient room provided  Taken 5/4/2022 0800 by Evonne Tapia RN  Infection Prevention:   environmental surveillance performed   equipment surfaces disinfected   hand hygiene promoted   personal protective equipment utilized   rest/sleep promoted   single patient room provided   visitors restricted/screened  Goal: Optimal Comfort and Wellbeing  Outcome: Met  Intervention: Provide Person-Centered Care  Recent Flowsheet Documentation  Taken 5/4/2022 0800 by Evonne Tapia RN  Trust Relationship/Rapport:   care explained   choices provided   questions encouraged   questions answered  Goal: Readiness for Transition of Care  Outcome: Met     Problem: Fall Injury Risk  Goal: Absence of Fall and Fall-Related Injury  Outcome: Met  Intervention: Identify and Manage Contributors  Recent Flowsheet Documentation  Taken 5/4/2022 1000 by Evonne Tapia RN  Medication Review/Management: medications reviewed  Taken 5/4/2022 0800 by Evonne Tapia RN  Medication Review/Management: medications reviewed  Intervention: Promote Injury-Free Environment  Recent Flowsheet Documentation  Taken 5/4/2022 1000 by Evonne Tapia RN  Safety Promotion/Fall Prevention:   safety round/check completed   room organization consistent   nonskid shoes/slippers when out of bed   fall prevention program maintained   clutter free environment maintained   assistive device/personal items within reach   activity supervised  Taken 5/4/2022 0800 by Evonne Tapia, RN  Safety Promotion/Fall Prevention:   safety round/check completed   room organization consistent   nonskid shoes/slippers when out of bed   fall prevention program maintained   clutter free environment maintained   assistive device/personal items within reach   activity supervised     Problem: Skin Injury Risk Increased  Goal: Skin Health and Integrity  Outcome: Met  Intervention: Optimize Skin Protection  Recent Flowsheet Documentation  Taken  5/4/2022 1000 by Evonne Tapia, RN  Head of Bed (HOB) Positioning: HOB at 20-30 degrees  Taken 5/4/2022 0800 by Evonne Tapia, RN  Pressure Reduction Techniques:   frequent weight shift encouraged   weight shift assistance provided  Head of Bed (HOB) Positioning: HOB at 30 degrees  Pressure Reduction Devices: pressure-redistributing mattress utilized  Skin Protection:   adhesive use limited   tubing/devices free from skin contact   incontinence pads utilized     Problem: Diabetes Comorbidity  Goal: Blood Glucose Level Within Targeted Range  Outcome: Met     Problem: Impaired Wound Healing  Goal: Optimal Wound Healing  Outcome: Met  Intervention: Promote Wound Healing  Recent Flowsheet Documentation  Taken 5/4/2022 1000 by Evonne Tapia, RN  Activity Management: activity adjusted per tolerance  Taken 5/4/2022 0800 by Evonne Tapia, RN  Activity Management: activity adjusted per tolerance

## 2022-05-05 NOTE — OUTREACH NOTE
Prep Survey    Flowsheet Row Responses   [facility] facility patient discharged from? [facility]   Is LACE score < 7 ? No   Emergency Room discharge w/ pulse ox? No   Eligibility Readm Mgmt   Discharge diagnosis Acute upper GI bleed likely secondary to peptic ulcer diseaseChronic systolic heart failure with ejection fraction of 35%.   Does the patient have one of the following disease processes/diagnoses(primary or secondary)? CHF   Does the patient have Home health ordered? No   Is there a DME ordered? No   Prep survey completed? Yes          JULY SAXENA - Registered Nurse

## 2022-05-06 LAB
BACTERIA SPEC AEROBE CULT: NORMAL
BACTERIA SPEC AEROBE CULT: NORMAL

## 2022-05-07 ENCOUNTER — APPOINTMENT (OUTPATIENT)
Dept: CT IMAGING | Facility: HOSPITAL | Age: 62
End: 2022-05-07

## 2022-05-07 ENCOUNTER — HOSPITAL ENCOUNTER (INPATIENT)
Facility: HOSPITAL | Age: 62
LOS: 11 days | Discharge: NURSING, REHAB OR LONG-TERM CARE | End: 2022-05-18
Attending: EMERGENCY MEDICINE | Admitting: FAMILY MEDICINE

## 2022-05-07 ENCOUNTER — APPOINTMENT (OUTPATIENT)
Dept: GENERAL RADIOLOGY | Facility: HOSPITAL | Age: 62
End: 2022-05-07

## 2022-05-07 ENCOUNTER — READMISSION MANAGEMENT (OUTPATIENT)
Dept: CALL CENTER | Facility: HOSPITAL | Age: 62
End: 2022-05-07

## 2022-05-07 DIAGNOSIS — R11.2 NAUSEA AND VOMITING, UNSPECIFIED VOMITING TYPE: Primary | ICD-10-CM

## 2022-05-07 DIAGNOSIS — Z99.2 ESRD (END STAGE RENAL DISEASE) ON DIALYSIS: ICD-10-CM

## 2022-05-07 DIAGNOSIS — N18.6 ESRD (END STAGE RENAL DISEASE) ON DIALYSIS: ICD-10-CM

## 2022-05-07 DIAGNOSIS — K20.90 ESOPHAGITIS: ICD-10-CM

## 2022-05-07 DIAGNOSIS — Z91.199 HISTORY OF NONCOMPLIANCE WITH MEDICAL TREATMENT: ICD-10-CM

## 2022-05-07 LAB
ALBUMIN SERPL-MCNC: 4.3 G/DL (ref 3.5–5.2)
ALBUMIN/GLOB SERPL: 0.9 G/DL
ALP SERPL-CCNC: 213 U/L (ref 39–117)
ALT SERPL W P-5'-P-CCNC: 17 U/L (ref 1–33)
ANION GAP SERPL CALCULATED.3IONS-SCNC: 32.1 MMOL/L (ref 5–15)
AST SERPL-CCNC: 31 U/L (ref 1–32)
BASOPHILS # BLD AUTO: 0.06 10*3/MM3 (ref 0–0.2)
BASOPHILS NFR BLD AUTO: 0.6 % (ref 0–1.5)
BILIRUB SERPL-MCNC: 0.5 MG/DL (ref 0–1.2)
BUN SERPL-MCNC: 91 MG/DL (ref 8–23)
BUN/CREAT SERPL: 13.4 (ref 7–25)
CALCIUM SPEC-SCNC: 10.6 MG/DL (ref 8.6–10.5)
CHLORIDE SERPL-SCNC: 83 MMOL/L (ref 98–107)
CO2 SERPL-SCNC: 12.9 MMOL/L (ref 22–29)
CREAT SERPL-MCNC: 6.81 MG/DL (ref 0.57–1)
D-LACTATE SERPL-SCNC: 2 MMOL/L (ref 0.5–2)
D-LACTATE SERPL-SCNC: 2.4 MMOL/L (ref 0.5–2)
DEPRECATED RDW RBC AUTO: 58.1 FL (ref 37–54)
EGFRCR SERPLBLD CKD-EPI 2021: 6.3 ML/MIN/1.73
EOSINOPHIL # BLD AUTO: 0.03 10*3/MM3 (ref 0–0.4)
EOSINOPHIL NFR BLD AUTO: 0.3 % (ref 0.3–6.2)
ERYTHROCYTE [DISTWIDTH] IN BLOOD BY AUTOMATED COUNT: 22.3 % (ref 12.3–15.4)
GLOBULIN UR ELPH-MCNC: 4.6 GM/DL
GLUCOSE BLDC GLUCOMTR-MCNC: 307 MG/DL (ref 70–130)
GLUCOSE BLDC GLUCOMTR-MCNC: 331 MG/DL (ref 70–130)
GLUCOSE SERPL-MCNC: 360 MG/DL (ref 65–99)
HCT VFR BLD AUTO: 47 % (ref 34–46.6)
HGB BLD-MCNC: 15.4 G/DL (ref 12–15.9)
IMM GRANULOCYTES # BLD AUTO: 0.1 10*3/MM3 (ref 0–0.05)
IMM GRANULOCYTES NFR BLD AUTO: 0.9 % (ref 0–0.5)
LIPASE SERPL-CCNC: 107 U/L (ref 13–60)
LYMPHOCYTES # BLD AUTO: 1.42 10*3/MM3 (ref 0.7–3.1)
LYMPHOCYTES NFR BLD AUTO: 13.1 % (ref 19.6–45.3)
MCH RBC QN AUTO: 25 PG (ref 26.6–33)
MCHC RBC AUTO-ENTMCNC: 32.8 G/DL (ref 31.5–35.7)
MCV RBC AUTO: 76.2 FL (ref 79–97)
MONOCYTES # BLD AUTO: 0.73 10*3/MM3 (ref 0.1–0.9)
MONOCYTES NFR BLD AUTO: 6.7 % (ref 5–12)
NEUTROPHILS NFR BLD AUTO: 78.4 % (ref 42.7–76)
NEUTROPHILS NFR BLD AUTO: 8.48 10*3/MM3 (ref 1.7–7)
NRBC BLD AUTO-RTO: 0 /100 WBC (ref 0–0.2)
NT-PROBNP SERPL-MCNC: ABNORMAL PG/ML (ref 0–900)
PLAT MORPH BLD: NORMAL
PLATELET # BLD AUTO: 172 10*3/MM3 (ref 140–450)
POTASSIUM SERPL-SCNC: 5.6 MMOL/L (ref 3.5–5.2)
PROCALCITONIN SERPL-MCNC: 0.62 NG/ML (ref 0–0.25)
PROT SERPL-MCNC: 8.9 G/DL (ref 6–8.5)
RBC # BLD AUTO: 6.17 10*6/MM3 (ref 3.77–5.28)
RBC MORPH BLD: NORMAL
SODIUM SERPL-SCNC: 128 MMOL/L (ref 136–145)
WBC MORPH BLD: NORMAL
WBC NRBC COR # BLD: 10.82 10*3/MM3 (ref 3.4–10.8)

## 2022-05-07 PROCEDURE — 83880 ASSAY OF NATRIURETIC PEPTIDE: CPT | Performed by: EMERGENCY MEDICINE

## 2022-05-07 PROCEDURE — 71045 X-RAY EXAM CHEST 1 VIEW: CPT

## 2022-05-07 PROCEDURE — 25010000002 ONDANSETRON PER 1 MG: Performed by: EMERGENCY MEDICINE

## 2022-05-07 PROCEDURE — 83605 ASSAY OF LACTIC ACID: CPT | Performed by: EMERGENCY MEDICINE

## 2022-05-07 PROCEDURE — 85025 COMPLETE CBC W/AUTO DIFF WBC: CPT | Performed by: EMERGENCY MEDICINE

## 2022-05-07 PROCEDURE — 63710000001 ONDANSETRON PER 8 MG: Performed by: FAMILY MEDICINE

## 2022-05-07 PROCEDURE — 84145 PROCALCITONIN (PCT): CPT | Performed by: EMERGENCY MEDICINE

## 2022-05-07 PROCEDURE — 99222 1ST HOSP IP/OBS MODERATE 55: CPT | Performed by: FAMILY MEDICINE

## 2022-05-07 PROCEDURE — 83690 ASSAY OF LIPASE: CPT | Performed by: EMERGENCY MEDICINE

## 2022-05-07 PROCEDURE — 74176 CT ABD & PELVIS W/O CONTRAST: CPT

## 2022-05-07 PROCEDURE — 82962 GLUCOSE BLOOD TEST: CPT

## 2022-05-07 PROCEDURE — 93005 ELECTROCARDIOGRAM TRACING: CPT | Performed by: EMERGENCY MEDICINE

## 2022-05-07 PROCEDURE — 85007 BL SMEAR W/DIFF WBC COUNT: CPT | Performed by: EMERGENCY MEDICINE

## 2022-05-07 PROCEDURE — 99284 EMERGENCY DEPT VISIT MOD MDM: CPT

## 2022-05-07 PROCEDURE — 63710000001 INSULIN ASPART PER 5 UNITS: Performed by: FAMILY MEDICINE

## 2022-05-07 PROCEDURE — 63710000001 INSULIN DETEMIR PER 5 UNITS: Performed by: FAMILY MEDICINE

## 2022-05-07 PROCEDURE — 80053 COMPREHEN METABOLIC PANEL: CPT | Performed by: EMERGENCY MEDICINE

## 2022-05-07 RX ORDER — DEXTROSE MONOHYDRATE 25 G/50ML
25 INJECTION, SOLUTION INTRAVENOUS
Status: DISCONTINUED | OUTPATIENT
Start: 2022-05-07 | End: 2022-05-18 | Stop reason: HOSPADM

## 2022-05-07 RX ORDER — SODIUM CHLORIDE 0.9 % (FLUSH) 0.9 %
10 SYRINGE (ML) INJECTION AS NEEDED
Status: DISCONTINUED | OUTPATIENT
Start: 2022-05-07 | End: 2022-05-18 | Stop reason: HOSPADM

## 2022-05-07 RX ORDER — PANTOPRAZOLE SODIUM 40 MG/10ML
40 INJECTION, POWDER, LYOPHILIZED, FOR SOLUTION INTRAVENOUS ONCE
Status: COMPLETED | OUTPATIENT
Start: 2022-05-07 | End: 2022-05-07

## 2022-05-07 RX ORDER — ONDANSETRON 4 MG/1
4 TABLET, FILM COATED ORAL EVERY 6 HOURS PRN
Status: DISCONTINUED | OUTPATIENT
Start: 2022-05-07 | End: 2022-05-18 | Stop reason: HOSPADM

## 2022-05-07 RX ORDER — METOPROLOL SUCCINATE 100 MG/1
100 TABLET, EXTENDED RELEASE ORAL
Status: DISCONTINUED | OUTPATIENT
Start: 2022-05-07 | End: 2022-05-08

## 2022-05-07 RX ORDER — ONDANSETRON 2 MG/ML
8 INJECTION INTRAMUSCULAR; INTRAVENOUS ONCE
Status: COMPLETED | OUTPATIENT
Start: 2022-05-07 | End: 2022-05-07

## 2022-05-07 RX ORDER — SODIUM CHLORIDE 9 MG/ML
75 INJECTION, SOLUTION INTRAVENOUS CONTINUOUS
Status: DISCONTINUED | OUTPATIENT
Start: 2022-05-07 | End: 2022-05-08

## 2022-05-07 RX ORDER — ESCITALOPRAM OXALATE 10 MG/1
10 TABLET ORAL DAILY
Status: DISCONTINUED | OUTPATIENT
Start: 2022-05-08 | End: 2022-05-18 | Stop reason: HOSPADM

## 2022-05-07 RX ORDER — FERROUS SULFATE TAB EC 324 MG (65 MG FE EQUIVALENT) 324 (65 FE) MG
324 TABLET DELAYED RESPONSE ORAL 2 TIMES DAILY WITH MEALS
Status: DISCONTINUED | OUTPATIENT
Start: 2022-05-07 | End: 2022-05-08

## 2022-05-07 RX ORDER — METOCLOPRAMIDE 5 MG/1
5 TABLET ORAL 3 TIMES DAILY
Status: DISCONTINUED | OUTPATIENT
Start: 2022-05-07 | End: 2022-05-18 | Stop reason: HOSPADM

## 2022-05-07 RX ORDER — QUETIAPINE FUMARATE 25 MG/1
12.5 TABLET, FILM COATED ORAL NIGHTLY
Status: DISCONTINUED | OUTPATIENT
Start: 2022-05-07 | End: 2022-05-18 | Stop reason: HOSPADM

## 2022-05-07 RX ORDER — PANTOPRAZOLE SODIUM 40 MG/1
40 TABLET, DELAYED RELEASE ORAL 2 TIMES DAILY
Status: DISCONTINUED | OUTPATIENT
Start: 2022-05-07 | End: 2022-05-18 | Stop reason: HOSPADM

## 2022-05-07 RX ORDER — SUCRALFATE 1 G/1
1 TABLET ORAL
Status: DISPENSED | OUTPATIENT
Start: 2022-05-07 | End: 2022-05-13

## 2022-05-07 RX ORDER — ACETAMINOPHEN 650 MG/1
650 SUPPOSITORY RECTAL EVERY 4 HOURS PRN
Status: DISCONTINUED | OUTPATIENT
Start: 2022-05-07 | End: 2022-05-18 | Stop reason: HOSPADM

## 2022-05-07 RX ORDER — INSULIN ASPART 100 [IU]/ML
0-9 INJECTION, SOLUTION INTRAVENOUS; SUBCUTANEOUS
Status: DISCONTINUED | OUTPATIENT
Start: 2022-05-07 | End: 2022-05-18 | Stop reason: HOSPADM

## 2022-05-07 RX ORDER — NICOTINE POLACRILEX 4 MG
1 LOZENGE BUCCAL
Status: DISCONTINUED | OUTPATIENT
Start: 2022-05-07 | End: 2022-05-18 | Stop reason: HOSPADM

## 2022-05-07 RX ORDER — SODIUM CHLORIDE 0.9 % (FLUSH) 0.9 %
10 SYRINGE (ML) INJECTION EVERY 12 HOURS SCHEDULED
Status: DISCONTINUED | OUTPATIENT
Start: 2022-05-07 | End: 2022-05-18 | Stop reason: HOSPADM

## 2022-05-07 RX ORDER — FOLIC ACID/VIT B COMPLEX AND C 0.8 MG
1 TABLET ORAL DAILY
Status: COMPLETED | OUTPATIENT
Start: 2022-05-08 | End: 2022-05-14

## 2022-05-07 RX ORDER — ACETAMINOPHEN 325 MG/1
650 TABLET ORAL EVERY 4 HOURS PRN
Status: DISCONTINUED | OUTPATIENT
Start: 2022-05-07 | End: 2022-05-18 | Stop reason: HOSPADM

## 2022-05-07 RX ORDER — ACETAMINOPHEN 160 MG/5ML
650 SOLUTION ORAL EVERY 4 HOURS PRN
Status: DISCONTINUED | OUTPATIENT
Start: 2022-05-07 | End: 2022-05-18 | Stop reason: HOSPADM

## 2022-05-07 RX ORDER — ONDANSETRON 2 MG/ML
4 INJECTION INTRAMUSCULAR; INTRAVENOUS EVERY 6 HOURS PRN
Status: DISCONTINUED | OUTPATIENT
Start: 2022-05-07 | End: 2022-05-18 | Stop reason: HOSPADM

## 2022-05-07 RX ORDER — LEVOTHYROXINE SODIUM 0.15 MG/1
150 TABLET ORAL
Status: DISCONTINUED | OUTPATIENT
Start: 2022-05-08 | End: 2022-05-18 | Stop reason: HOSPADM

## 2022-05-07 RX ORDER — ISOSORBIDE MONONITRATE 30 MG/1
30 TABLET, EXTENDED RELEASE ORAL DAILY
Status: DISCONTINUED | OUTPATIENT
Start: 2022-05-08 | End: 2022-05-08

## 2022-05-07 RX ADMIN — SODIUM BICARBONATE 100 MEQ: 84 INJECTION INTRAVENOUS at 23:40

## 2022-05-07 RX ADMIN — ONDANSETRON HYDROCHLORIDE 4 MG: 4 TABLET, FILM COATED ORAL at 18:40

## 2022-05-07 RX ADMIN — INSULIN DETEMIR 10 UNITS: 100 INJECTION, SOLUTION SUBCUTANEOUS at 18:00

## 2022-05-07 RX ADMIN — INSULIN ASPART 7 UNITS: 100 INJECTION, SOLUTION INTRAVENOUS; SUBCUTANEOUS at 18:41

## 2022-05-07 RX ADMIN — SODIUM CHLORIDE 75 ML/HR: 9 INJECTION, SOLUTION INTRAVENOUS at 18:41

## 2022-05-07 RX ADMIN — QUETIAPINE FUMARATE 12.5 MG: 25 TABLET ORAL at 20:30

## 2022-05-07 RX ADMIN — METOPROLOL SUCCINATE 100 MG: 100 TABLET, EXTENDED RELEASE ORAL at 18:40

## 2022-05-07 RX ADMIN — ONDANSETRON 8 MG: 2 INJECTION INTRAMUSCULAR; INTRAVENOUS at 09:18

## 2022-05-07 RX ADMIN — PANTOPRAZOLE SODIUM 40 MG: 40 INJECTION, POWDER, FOR SOLUTION INTRAVENOUS at 09:18

## 2022-05-07 RX ADMIN — SODIUM ZIRCONIUM CYCLOSILICATE 10 G: 10 POWDER, FOR SUSPENSION ORAL at 18:41

## 2022-05-07 RX ADMIN — Medication 10 ML: at 20:30

## 2022-05-07 RX ADMIN — FERROUS SULFATE TAB EC 324 MG (65 MG FE EQUIVALENT) 324 MG: 324 (65 FE) TABLET DELAYED RESPONSE at 18:40

## 2022-05-07 RX ADMIN — PANTOPRAZOLE SODIUM 40 MG: 40 TABLET, DELAYED RELEASE ORAL at 20:30

## 2022-05-07 RX ADMIN — SUCRALFATE 1 G: 1 TABLET ORAL at 20:30

## 2022-05-07 RX ADMIN — METOCLOPRAMIDE 5 MG: 5 TABLET ORAL at 20:30

## 2022-05-07 NOTE — OUTREACH NOTE
CHF Week 1 Survey    Flowsheet Row Responses   [facility] patient discharged from? [facility]   Does the patient have one of the following disease processes/diagnoses(primary or secondary)? CHF   CHF Week 1 attempt successful? No   Unsuccessful attempts Attempt 1          MEGAN LITTLEJOHN - Registered Nurse

## 2022-05-08 ENCOUNTER — READMISSION MANAGEMENT (OUTPATIENT)
Dept: CALL CENTER | Facility: HOSPITAL | Age: 62
End: 2022-05-08

## 2022-05-08 LAB
ALBUMIN SERPL-MCNC: 3.9 G/DL (ref 3.5–5.2)
ALBUMIN/GLOB SERPL: 0.9 G/DL
ALP SERPL-CCNC: 189 U/L (ref 39–117)
ALT SERPL W P-5'-P-CCNC: 13 U/L (ref 1–33)
ANION GAP SERPL CALCULATED.3IONS-SCNC: 28 MMOL/L (ref 5–15)
AST SERPL-CCNC: 18 U/L (ref 1–32)
BILIRUB SERPL-MCNC: 0.5 MG/DL (ref 0–1.2)
BUN SERPL-MCNC: 110 MG/DL (ref 8–23)
BUN/CREAT SERPL: 14.2 (ref 7–25)
CALCIUM SPEC-SCNC: 10 MG/DL (ref 8.6–10.5)
CHLORIDE SERPL-SCNC: 86 MMOL/L (ref 98–107)
CO2 SERPL-SCNC: 16 MMOL/L (ref 22–29)
CREAT SERPL-MCNC: 7.73 MG/DL (ref 0.57–1)
EGFRCR SERPLBLD CKD-EPI 2021: 5.5 ML/MIN/1.73
GLOBULIN UR ELPH-MCNC: 4.2 GM/DL
GLUCOSE BLDC GLUCOMTR-MCNC: 202 MG/DL (ref 70–130)
GLUCOSE BLDC GLUCOMTR-MCNC: 240 MG/DL (ref 70–130)
GLUCOSE BLDC GLUCOMTR-MCNC: 255 MG/DL (ref 70–130)
GLUCOSE BLDC GLUCOMTR-MCNC: 93 MG/DL (ref 70–130)
GLUCOSE SERPL-MCNC: 256 MG/DL (ref 65–99)
POTASSIUM SERPL-SCNC: 4.2 MMOL/L (ref 3.5–5.2)
PROT SERPL-MCNC: 8.1 G/DL (ref 6–8.5)
SODIUM SERPL-SCNC: 130 MMOL/L (ref 136–145)

## 2022-05-08 PROCEDURE — 63710000001 INSULIN DETEMIR PER 5 UNITS: Performed by: FAMILY MEDICINE

## 2022-05-08 PROCEDURE — P9047 ALBUMIN (HUMAN), 25%, 50ML: HCPCS | Performed by: INTERNAL MEDICINE

## 2022-05-08 PROCEDURE — 82962 GLUCOSE BLOOD TEST: CPT

## 2022-05-08 PROCEDURE — 25010000002 ALBUMIN HUMAN 25% PER 50 ML: Performed by: INTERNAL MEDICINE

## 2022-05-08 PROCEDURE — 99232 SBSQ HOSP IP/OBS MODERATE 35: CPT | Performed by: FAMILY MEDICINE

## 2022-05-08 PROCEDURE — 63710000001 INSULIN ASPART PER 5 UNITS: Performed by: FAMILY MEDICINE

## 2022-05-08 PROCEDURE — 80053 COMPREHEN METABOLIC PANEL: CPT | Performed by: FAMILY MEDICINE

## 2022-05-08 PROCEDURE — 5A1D70Z PERFORMANCE OF URINARY FILTRATION, INTERMITTENT, LESS THAN 6 HOURS PER DAY: ICD-10-PCS | Performed by: STUDENT IN AN ORGANIZED HEALTH CARE EDUCATION/TRAINING PROGRAM

## 2022-05-08 PROCEDURE — 25010000002 HEPARIN (PORCINE) PER 1000 UNITS: Performed by: INTERNAL MEDICINE

## 2022-05-08 RX ORDER — HEPARIN SODIUM 1000 [USP'U]/ML
1000 INJECTION, SOLUTION INTRAVENOUS; SUBCUTANEOUS ONCE
Status: DISCONTINUED | OUTPATIENT
Start: 2022-05-08 | End: 2022-05-18 | Stop reason: HOSPADM

## 2022-05-08 RX ORDER — ALBUMIN (HUMAN) 12.5 G/50ML
12.5 SOLUTION INTRAVENOUS AS NEEDED
Status: COMPLETED | OUTPATIENT
Start: 2022-05-08 | End: 2022-05-08

## 2022-05-08 RX ORDER — HEPARIN SODIUM 1000 [USP'U]/ML
3000 INJECTION, SOLUTION INTRAVENOUS; SUBCUTANEOUS ONCE
Status: COMPLETED | OUTPATIENT
Start: 2022-05-08 | End: 2022-05-08

## 2022-05-08 RX ORDER — ALBUMIN (HUMAN) 12.5 G/50ML
12.5 SOLUTION INTRAVENOUS ONCE
Status: DISCONTINUED | OUTPATIENT
Start: 2022-05-08 | End: 2022-05-08

## 2022-05-08 RX ORDER — MIDODRINE HYDROCHLORIDE 5 MG/1
10 TABLET ORAL
Status: DISCONTINUED | OUTPATIENT
Start: 2022-05-08 | End: 2022-05-18 | Stop reason: HOSPADM

## 2022-05-08 RX ADMIN — SUCRALFATE 1 G: 1 TABLET ORAL at 06:45

## 2022-05-08 RX ADMIN — ESCITALOPRAM OXALATE 10 MG: 10 TABLET ORAL at 08:43

## 2022-05-08 RX ADMIN — METOCLOPRAMIDE 5 MG: 5 TABLET ORAL at 21:07

## 2022-05-08 RX ADMIN — SUCRALFATE 1 G: 1 TABLET ORAL at 21:07

## 2022-05-08 RX ADMIN — Medication 1 TABLET: at 08:43

## 2022-05-08 RX ADMIN — METOCLOPRAMIDE 5 MG: 5 TABLET ORAL at 17:42

## 2022-05-08 RX ADMIN — METOPROLOL SUCCINATE 100 MG: 100 TABLET, EXTENDED RELEASE ORAL at 08:43

## 2022-05-08 RX ADMIN — Medication 10 ML: at 08:45

## 2022-05-08 RX ADMIN — FERROUS SULFATE TAB EC 324 MG (65 MG FE EQUIVALENT) 324 MG: 324 (65 FE) TABLET DELAYED RESPONSE at 08:43

## 2022-05-08 RX ADMIN — HEPARIN SODIUM 3000 UNITS: 1000 INJECTION INTRAVENOUS; SUBCUTANEOUS at 13:00

## 2022-05-08 RX ADMIN — ALBUMIN HUMAN 12.5 G: 0.25 SOLUTION INTRAVENOUS at 12:45

## 2022-05-08 RX ADMIN — INSULIN ASPART 4 UNITS: 100 INJECTION, SOLUTION INTRAVENOUS; SUBCUTANEOUS at 06:45

## 2022-05-08 RX ADMIN — ISOSORBIDE MONONITRATE 30 MG: 30 TABLET, EXTENDED RELEASE ORAL at 08:43

## 2022-05-08 RX ADMIN — MIDODRINE HYDROCHLORIDE 10 MG: 5 TABLET ORAL at 17:42

## 2022-05-08 RX ADMIN — SUCRALFATE 1 G: 1 TABLET ORAL at 17:42

## 2022-05-08 RX ADMIN — ALBUMIN HUMAN 12.5 G: 0.25 SOLUTION INTRAVENOUS at 12:15

## 2022-05-08 RX ADMIN — Medication 10 ML: at 21:07

## 2022-05-08 RX ADMIN — PANTOPRAZOLE SODIUM 40 MG: 40 TABLET, DELAYED RELEASE ORAL at 08:43

## 2022-05-08 RX ADMIN — INSULIN DETEMIR 10 UNITS: 100 INJECTION, SOLUTION SUBCUTANEOUS at 08:43

## 2022-05-08 RX ADMIN — ALBUMIN HUMAN 12.5 G: 0.25 SOLUTION INTRAVENOUS at 11:45

## 2022-05-08 RX ADMIN — METOCLOPRAMIDE 5 MG: 5 TABLET ORAL at 08:43

## 2022-05-08 RX ADMIN — PANTOPRAZOLE SODIUM 40 MG: 40 TABLET, DELAYED RELEASE ORAL at 21:07

## 2022-05-08 RX ADMIN — LEVOTHYROXINE SODIUM 150 MCG: 150 TABLET ORAL at 06:50

## 2022-05-08 RX ADMIN — MIDODRINE HYDROCHLORIDE 10 MG: 5 TABLET ORAL at 13:00

## 2022-05-08 RX ADMIN — QUETIAPINE FUMARATE 12.5 MG: 25 TABLET ORAL at 21:06

## 2022-05-08 RX ADMIN — ALBUMIN HUMAN 12.5 G: 0.25 SOLUTION INTRAVENOUS at 11:15

## 2022-05-08 NOTE — OUTREACH NOTE
CHF Week 1 Survey    Flowsheet Row Responses   [facility] facility patient discharged from? [facility]   Does the patient have one of the following disease processes/diagnoses(primary or secondary)? CHF   CHF Week 1 attempt successful? No   Unsuccessful attempts Attempt 2   Revoke Readmitted          MILAGROS HUNTER - Registered Nurse

## 2022-05-09 LAB
ALBUMIN SERPL-MCNC: 4.5 G/DL (ref 3.5–5.2)
ANION GAP SERPL CALCULATED.3IONS-SCNC: 22.7 MMOL/L (ref 5–15)
BASOPHILS # BLD AUTO: 0.08 10*3/MM3 (ref 0–0.2)
BASOPHILS NFR BLD AUTO: 1 % (ref 0–1.5)
BUN SERPL-MCNC: 48 MG/DL (ref 8–23)
BUN/CREAT SERPL: 9.7 (ref 7–25)
CALCIUM SPEC-SCNC: 9.6 MG/DL (ref 8.6–10.5)
CHLORIDE SERPL-SCNC: 90 MMOL/L (ref 98–107)
CO2 SERPL-SCNC: 19.3 MMOL/L (ref 22–29)
CREAT SERPL-MCNC: 4.96 MG/DL (ref 0.57–1)
DEPRECATED RDW RBC AUTO: 59.2 FL (ref 37–54)
EGFRCR SERPLBLD CKD-EPI 2021: 9.3 ML/MIN/1.73
EOSINOPHIL # BLD AUTO: 0.22 10*3/MM3 (ref 0–0.4)
EOSINOPHIL NFR BLD AUTO: 2.7 % (ref 0.3–6.2)
ERYTHROCYTE [DISTWIDTH] IN BLOOD BY AUTOMATED COUNT: 22 % (ref 12.3–15.4)
GLUCOSE BLDC GLUCOMTR-MCNC: 123 MG/DL (ref 70–130)
GLUCOSE BLDC GLUCOMTR-MCNC: 134 MG/DL (ref 70–130)
GLUCOSE BLDC GLUCOMTR-MCNC: 167 MG/DL (ref 70–130)
GLUCOSE BLDC GLUCOMTR-MCNC: 270 MG/DL (ref 70–130)
GLUCOSE BLDC GLUCOMTR-MCNC: 395 MG/DL (ref 70–130)
GLUCOSE SERPL-MCNC: 170 MG/DL (ref 65–99)
HCT VFR BLD AUTO: 41 % (ref 34–46.6)
HGB BLD-MCNC: 13.4 G/DL (ref 12–15.9)
HYPOCHROMIA BLD QL: NORMAL
IMM GRANULOCYTES # BLD AUTO: 0.11 10*3/MM3 (ref 0–0.05)
IMM GRANULOCYTES NFR BLD AUTO: 1.3 % (ref 0–0.5)
LYMPHOCYTES # BLD AUTO: 1.78 10*3/MM3 (ref 0.7–3.1)
LYMPHOCYTES NFR BLD AUTO: 21.8 % (ref 19.6–45.3)
MCH RBC QN AUTO: 25.2 PG (ref 26.6–33)
MCHC RBC AUTO-ENTMCNC: 32.7 G/DL (ref 31.5–35.7)
MCV RBC AUTO: 77.1 FL (ref 79–97)
MICROCYTES BLD QL: NORMAL
MONOCYTES # BLD AUTO: 1.03 10*3/MM3 (ref 0.1–0.9)
MONOCYTES NFR BLD AUTO: 12.6 % (ref 5–12)
NEUTROPHILS NFR BLD AUTO: 4.95 10*3/MM3 (ref 1.7–7)
NEUTROPHILS NFR BLD AUTO: 60.6 % (ref 42.7–76)
NRBC BLD AUTO-RTO: 0 /100 WBC (ref 0–0.2)
PHOSPHATE SERPL-MCNC: 4 MG/DL (ref 2.5–4.5)
PLATELET # BLD AUTO: 162 10*3/MM3 (ref 140–450)
PMV BLD AUTO: 10.1 FL (ref 6–12)
POTASSIUM SERPL-SCNC: 3.8 MMOL/L (ref 3.5–5.2)
RBC # BLD AUTO: 5.32 10*6/MM3 (ref 3.77–5.28)
SMALL PLATELETS BLD QL SMEAR: ADEQUATE
SODIUM SERPL-SCNC: 132 MMOL/L (ref 136–145)
WBC MORPH BLD: NORMAL
WBC NRBC COR # BLD: 8.17 10*3/MM3 (ref 3.4–10.8)

## 2022-05-09 PROCEDURE — 85007 BL SMEAR W/DIFF WBC COUNT: CPT | Performed by: INTERNAL MEDICINE

## 2022-05-09 PROCEDURE — 63710000001 ONDANSETRON PER 8 MG: Performed by: FAMILY MEDICINE

## 2022-05-09 PROCEDURE — 99232 SBSQ HOSP IP/OBS MODERATE 35: CPT | Performed by: FAMILY MEDICINE

## 2022-05-09 PROCEDURE — 80069 RENAL FUNCTION PANEL: CPT | Performed by: INTERNAL MEDICINE

## 2022-05-09 PROCEDURE — 82962 GLUCOSE BLOOD TEST: CPT

## 2022-05-09 PROCEDURE — 63710000001 INSULIN ASPART PER 5 UNITS: Performed by: FAMILY MEDICINE

## 2022-05-09 PROCEDURE — 85025 COMPLETE CBC W/AUTO DIFF WBC: CPT | Performed by: INTERNAL MEDICINE

## 2022-05-09 PROCEDURE — 63710000001 INSULIN DETEMIR PER 5 UNITS: Performed by: FAMILY MEDICINE

## 2022-05-09 RX ORDER — ALBUMIN (HUMAN) 12.5 G/50ML
12.5 SOLUTION INTRAVENOUS AS NEEDED
Status: DISPENSED | OUTPATIENT
Start: 2022-05-09 | End: 2022-05-09

## 2022-05-09 RX ADMIN — Medication 1 TABLET: at 09:55

## 2022-05-09 RX ADMIN — ONDANSETRON HYDROCHLORIDE 4 MG: 4 TABLET, FILM COATED ORAL at 15:19

## 2022-05-09 RX ADMIN — ESCITALOPRAM OXALATE 10 MG: 10 TABLET ORAL at 09:55

## 2022-05-09 RX ADMIN — METOCLOPRAMIDE 5 MG: 5 TABLET ORAL at 09:55

## 2022-05-09 RX ADMIN — LEVOTHYROXINE SODIUM 150 MCG: 150 TABLET ORAL at 06:56

## 2022-05-09 RX ADMIN — METOCLOPRAMIDE 5 MG: 5 TABLET ORAL at 15:19

## 2022-05-09 RX ADMIN — MIDODRINE HYDROCHLORIDE 10 MG: 5 TABLET ORAL at 06:56

## 2022-05-09 RX ADMIN — Medication 10 ML: at 20:44

## 2022-05-09 RX ADMIN — Medication 10 ML: at 09:56

## 2022-05-09 RX ADMIN — METOPROLOL TARTRATE 12.5 MG: 25 TABLET, FILM COATED ORAL at 09:55

## 2022-05-09 RX ADMIN — INSULIN ASPART 2 UNITS: 100 INJECTION, SOLUTION INTRAVENOUS; SUBCUTANEOUS at 06:56

## 2022-05-09 RX ADMIN — QUETIAPINE FUMARATE 12.5 MG: 25 TABLET ORAL at 20:44

## 2022-05-09 RX ADMIN — METOCLOPRAMIDE 5 MG: 5 TABLET ORAL at 20:44

## 2022-05-09 RX ADMIN — PANTOPRAZOLE SODIUM 40 MG: 40 TABLET, DELAYED RELEASE ORAL at 20:44

## 2022-05-09 RX ADMIN — INSULIN DETEMIR 10 UNITS: 100 INJECTION, SOLUTION SUBCUTANEOUS at 09:56

## 2022-05-09 RX ADMIN — PANTOPRAZOLE SODIUM 40 MG: 40 TABLET, DELAYED RELEASE ORAL at 09:55

## 2022-05-09 RX ADMIN — SUCRALFATE 1 G: 1 TABLET ORAL at 06:56

## 2022-05-09 RX ADMIN — METOPROLOL TARTRATE 12.5 MG: 25 TABLET, FILM COATED ORAL at 20:44

## 2022-05-09 RX ADMIN — MIDODRINE HYDROCHLORIDE 10 MG: 5 TABLET ORAL at 10:56

## 2022-05-09 RX ADMIN — SUCRALFATE 1 G: 1 TABLET ORAL at 20:44

## 2022-05-10 LAB
GLUCOSE BLDC GLUCOMTR-MCNC: 208 MG/DL (ref 70–130)
GLUCOSE BLDC GLUCOMTR-MCNC: 251 MG/DL (ref 70–130)
GLUCOSE BLDC GLUCOMTR-MCNC: 256 MG/DL (ref 70–130)
GLUCOSE BLDC GLUCOMTR-MCNC: 260 MG/DL (ref 70–130)

## 2022-05-10 PROCEDURE — 63710000001 INSULIN DETEMIR PER 5 UNITS: Performed by: FAMILY MEDICINE

## 2022-05-10 PROCEDURE — 63710000001 INSULIN ASPART PER 5 UNITS: Performed by: FAMILY MEDICINE

## 2022-05-10 PROCEDURE — 99231 SBSQ HOSP IP/OBS SF/LOW 25: CPT | Performed by: FAMILY MEDICINE

## 2022-05-10 PROCEDURE — 82962 GLUCOSE BLOOD TEST: CPT

## 2022-05-10 RX ADMIN — INSULIN ASPART 6 UNITS: 100 INJECTION, SOLUTION INTRAVENOUS; SUBCUTANEOUS at 12:30

## 2022-05-10 RX ADMIN — SUCRALFATE 1 G: 1 TABLET ORAL at 18:12

## 2022-05-10 RX ADMIN — QUETIAPINE FUMARATE 12.5 MG: 25 TABLET ORAL at 21:04

## 2022-05-10 RX ADMIN — SUCRALFATE 1 G: 1 TABLET ORAL at 06:40

## 2022-05-10 RX ADMIN — PANTOPRAZOLE SODIUM 40 MG: 40 TABLET, DELAYED RELEASE ORAL at 10:04

## 2022-05-10 RX ADMIN — METOCLOPRAMIDE 5 MG: 5 TABLET ORAL at 21:04

## 2022-05-10 RX ADMIN — MIDODRINE HYDROCHLORIDE 10 MG: 5 TABLET ORAL at 06:40

## 2022-05-10 RX ADMIN — METOPROLOL TARTRATE 25 MG: 25 TABLET, FILM COATED ORAL at 10:04

## 2022-05-10 RX ADMIN — METOPROLOL TARTRATE 25 MG: 25 TABLET, FILM COATED ORAL at 21:04

## 2022-05-10 RX ADMIN — MIDODRINE HYDROCHLORIDE 10 MG: 5 TABLET ORAL at 18:12

## 2022-05-10 RX ADMIN — PANTOPRAZOLE SODIUM 40 MG: 40 TABLET, DELAYED RELEASE ORAL at 21:04

## 2022-05-10 RX ADMIN — Medication 10 ML: at 21:04

## 2022-05-10 RX ADMIN — SUCRALFATE 1 G: 1 TABLET ORAL at 11:56

## 2022-05-10 RX ADMIN — MIDODRINE HYDROCHLORIDE 10 MG: 5 TABLET ORAL at 11:56

## 2022-05-10 RX ADMIN — Medication 1 TABLET: at 10:04

## 2022-05-10 RX ADMIN — METOCLOPRAMIDE 5 MG: 5 TABLET ORAL at 18:12

## 2022-05-10 RX ADMIN — INSULIN ASPART 6 UNITS: 100 INJECTION, SOLUTION INTRAVENOUS; SUBCUTANEOUS at 06:40

## 2022-05-10 RX ADMIN — METOCLOPRAMIDE 5 MG: 5 TABLET ORAL at 10:04

## 2022-05-10 RX ADMIN — SUCRALFATE 1 G: 1 TABLET ORAL at 21:04

## 2022-05-10 RX ADMIN — INSULIN ASPART 4 UNITS: 100 INJECTION, SOLUTION INTRAVENOUS; SUBCUTANEOUS at 19:04

## 2022-05-10 RX ADMIN — Medication 10 ML: at 10:05

## 2022-05-10 RX ADMIN — ESCITALOPRAM OXALATE 10 MG: 10 TABLET ORAL at 10:04

## 2022-05-10 RX ADMIN — LEVOTHYROXINE SODIUM 150 MCG: 150 TABLET ORAL at 06:40

## 2022-05-10 RX ADMIN — INSULIN DETEMIR 10 UNITS: 100 INJECTION, SOLUTION SUBCUTANEOUS at 10:05

## 2022-05-11 LAB
GLUCOSE BLDC GLUCOMTR-MCNC: 148 MG/DL (ref 70–130)
GLUCOSE BLDC GLUCOMTR-MCNC: 241 MG/DL (ref 70–130)
GLUCOSE BLDC GLUCOMTR-MCNC: 255 MG/DL (ref 70–130)
GLUCOSE BLDC GLUCOMTR-MCNC: 350 MG/DL (ref 70–130)

## 2022-05-11 PROCEDURE — 82962 GLUCOSE BLOOD TEST: CPT

## 2022-05-11 PROCEDURE — 99232 SBSQ HOSP IP/OBS MODERATE 35: CPT | Performed by: FAMILY MEDICINE

## 2022-05-11 PROCEDURE — 63710000001 INSULIN ASPART PER 5 UNITS: Performed by: FAMILY MEDICINE

## 2022-05-11 PROCEDURE — 63710000001 INSULIN DETEMIR PER 5 UNITS: Performed by: FAMILY MEDICINE

## 2022-05-11 PROCEDURE — 25010000002 ONDANSETRON PER 1 MG: Performed by: FAMILY MEDICINE

## 2022-05-11 RX ORDER — HEPARIN SODIUM 1000 [USP'U]/ML
1000 INJECTION, SOLUTION INTRAVENOUS; SUBCUTANEOUS AS NEEDED
Status: DISCONTINUED | OUTPATIENT
Start: 2022-05-11 | End: 2022-05-18 | Stop reason: HOSPADM

## 2022-05-11 RX ADMIN — Medication 1 TABLET: at 14:32

## 2022-05-11 RX ADMIN — MIDODRINE HYDROCHLORIDE 10 MG: 5 TABLET ORAL at 06:39

## 2022-05-11 RX ADMIN — ESCITALOPRAM OXALATE 10 MG: 10 TABLET ORAL at 14:32

## 2022-05-11 RX ADMIN — METOCLOPRAMIDE 5 MG: 5 TABLET ORAL at 22:00

## 2022-05-11 RX ADMIN — METOCLOPRAMIDE 5 MG: 5 TABLET ORAL at 14:32

## 2022-05-11 RX ADMIN — LEVOTHYROXINE SODIUM 150 MCG: 150 TABLET ORAL at 06:39

## 2022-05-11 RX ADMIN — SUCRALFATE 1 G: 1 TABLET ORAL at 22:00

## 2022-05-11 RX ADMIN — QUETIAPINE FUMARATE 12.5 MG: 25 TABLET ORAL at 22:00

## 2022-05-11 RX ADMIN — Medication 10 ML: at 08:24

## 2022-05-11 RX ADMIN — MIDODRINE HYDROCHLORIDE 10 MG: 5 TABLET ORAL at 14:32

## 2022-05-11 RX ADMIN — INSULIN DETEMIR 10 UNITS: 100 INJECTION, SOLUTION SUBCUTANEOUS at 09:27

## 2022-05-11 RX ADMIN — INSULIN ASPART 4 UNITS: 100 INJECTION, SOLUTION INTRAVENOUS; SUBCUTANEOUS at 17:05

## 2022-05-11 RX ADMIN — SUCRALFATE 1 G: 1 TABLET ORAL at 06:39

## 2022-05-11 RX ADMIN — PANTOPRAZOLE SODIUM 40 MG: 40 TABLET, DELAYED RELEASE ORAL at 14:32

## 2022-05-11 RX ADMIN — ONDANSETRON 4 MG: 2 INJECTION INTRAMUSCULAR; INTRAVENOUS at 08:24

## 2022-05-11 RX ADMIN — PANTOPRAZOLE SODIUM 40 MG: 40 TABLET, DELAYED RELEASE ORAL at 22:00

## 2022-05-11 RX ADMIN — SUCRALFATE 1 G: 1 TABLET ORAL at 14:32

## 2022-05-11 RX ADMIN — Medication 10 ML: at 22:00

## 2022-05-11 RX ADMIN — INSULIN ASPART 4 UNITS: 100 INJECTION, SOLUTION INTRAVENOUS; SUBCUTANEOUS at 06:39

## 2022-05-12 LAB
GLUCOSE BLDC GLUCOMTR-MCNC: 219 MG/DL (ref 70–130)
GLUCOSE BLDC GLUCOMTR-MCNC: 230 MG/DL (ref 70–130)
GLUCOSE BLDC GLUCOMTR-MCNC: 326 MG/DL (ref 70–130)
GLUCOSE BLDC GLUCOMTR-MCNC: 333 MG/DL (ref 70–130)

## 2022-05-12 PROCEDURE — 97161 PT EVAL LOW COMPLEX 20 MIN: CPT

## 2022-05-12 PROCEDURE — 63710000001 INSULIN ASPART PER 5 UNITS: Performed by: FAMILY MEDICINE

## 2022-05-12 PROCEDURE — 63710000001 INSULIN DETEMIR PER 5 UNITS: Performed by: FAMILY MEDICINE

## 2022-05-12 PROCEDURE — 99231 SBSQ HOSP IP/OBS SF/LOW 25: CPT | Performed by: FAMILY MEDICINE

## 2022-05-12 PROCEDURE — 82962 GLUCOSE BLOOD TEST: CPT

## 2022-05-12 RX ADMIN — PANTOPRAZOLE SODIUM 40 MG: 40 TABLET, DELAYED RELEASE ORAL at 21:00

## 2022-05-12 RX ADMIN — LEVOTHYROXINE SODIUM 150 MCG: 150 TABLET ORAL at 06:56

## 2022-05-12 RX ADMIN — METOCLOPRAMIDE 5 MG: 5 TABLET ORAL at 21:00

## 2022-05-12 RX ADMIN — PANTOPRAZOLE SODIUM 40 MG: 40 TABLET, DELAYED RELEASE ORAL at 09:22

## 2022-05-12 RX ADMIN — SUCRALFATE 1 G: 1 TABLET ORAL at 06:56

## 2022-05-12 RX ADMIN — INSULIN ASPART 4 UNITS: 100 INJECTION, SOLUTION INTRAVENOUS; SUBCUTANEOUS at 11:25

## 2022-05-12 RX ADMIN — Medication 10 ML: at 09:23

## 2022-05-12 RX ADMIN — Medication 1 TABLET: at 09:22

## 2022-05-12 RX ADMIN — MIDODRINE HYDROCHLORIDE 10 MG: 5 TABLET ORAL at 16:33

## 2022-05-12 RX ADMIN — Medication 10 ML: at 21:00

## 2022-05-12 RX ADMIN — MIDODRINE HYDROCHLORIDE 10 MG: 5 TABLET ORAL at 11:25

## 2022-05-12 RX ADMIN — ESCITALOPRAM OXALATE 10 MG: 10 TABLET ORAL at 09:22

## 2022-05-12 RX ADMIN — SUCRALFATE 1 G: 1 TABLET ORAL at 11:25

## 2022-05-12 RX ADMIN — SUCRALFATE 1 G: 1 TABLET ORAL at 21:00

## 2022-05-12 RX ADMIN — INSULIN DETEMIR 10 UNITS: 100 INJECTION, SOLUTION SUBCUTANEOUS at 09:23

## 2022-05-12 RX ADMIN — INSULIN ASPART 7 UNITS: 100 INJECTION, SOLUTION INTRAVENOUS; SUBCUTANEOUS at 06:56

## 2022-05-12 RX ADMIN — QUETIAPINE FUMARATE 12.5 MG: 25 TABLET ORAL at 21:00

## 2022-05-12 RX ADMIN — METOPROLOL TARTRATE 25 MG: 25 TABLET, FILM COATED ORAL at 21:00

## 2022-05-12 RX ADMIN — METOPROLOL TARTRATE 25 MG: 25 TABLET, FILM COATED ORAL at 09:22

## 2022-05-12 RX ADMIN — INSULIN ASPART 7 UNITS: 100 INJECTION, SOLUTION INTRAVENOUS; SUBCUTANEOUS at 16:33

## 2022-05-12 RX ADMIN — METOCLOPRAMIDE 5 MG: 5 TABLET ORAL at 16:33

## 2022-05-12 RX ADMIN — METOCLOPRAMIDE 5 MG: 5 TABLET ORAL at 09:22

## 2022-05-12 RX ADMIN — MIDODRINE HYDROCHLORIDE 10 MG: 5 TABLET ORAL at 06:56

## 2022-05-12 RX ADMIN — SUCRALFATE 1 G: 1 TABLET ORAL at 17:37

## 2022-05-13 LAB
GLUCOSE BLDC GLUCOMTR-MCNC: 134 MG/DL (ref 70–130)
GLUCOSE BLDC GLUCOMTR-MCNC: 187 MG/DL (ref 70–130)
GLUCOSE BLDC GLUCOMTR-MCNC: 215 MG/DL (ref 70–130)
GLUCOSE BLDC GLUCOMTR-MCNC: 336 MG/DL (ref 70–130)

## 2022-05-13 PROCEDURE — 82962 GLUCOSE BLOOD TEST: CPT

## 2022-05-13 PROCEDURE — 63710000001 INSULIN ASPART PER 5 UNITS: Performed by: FAMILY MEDICINE

## 2022-05-13 PROCEDURE — 99231 SBSQ HOSP IP/OBS SF/LOW 25: CPT | Performed by: FAMILY MEDICINE

## 2022-05-13 PROCEDURE — 63710000001 INSULIN DETEMIR PER 5 UNITS: Performed by: FAMILY MEDICINE

## 2022-05-13 RX ADMIN — METOPROLOL TARTRATE 25 MG: 25 TABLET, FILM COATED ORAL at 09:13

## 2022-05-13 RX ADMIN — INSULIN DETEMIR 10 UNITS: 100 INJECTION, SOLUTION SUBCUTANEOUS at 09:40

## 2022-05-13 RX ADMIN — MIDODRINE HYDROCHLORIDE 10 MG: 5 TABLET ORAL at 06:55

## 2022-05-13 RX ADMIN — INSULIN ASPART 2 UNITS: 100 INJECTION, SOLUTION INTRAVENOUS; SUBCUTANEOUS at 12:50

## 2022-05-13 RX ADMIN — PANTOPRAZOLE SODIUM 40 MG: 40 TABLET, DELAYED RELEASE ORAL at 09:12

## 2022-05-13 RX ADMIN — MIDODRINE HYDROCHLORIDE 10 MG: 5 TABLET ORAL at 12:50

## 2022-05-13 RX ADMIN — Medication 1 TABLET: at 09:12

## 2022-05-13 RX ADMIN — INSULIN ASPART 7 UNITS: 100 INJECTION, SOLUTION INTRAVENOUS; SUBCUTANEOUS at 06:55

## 2022-05-13 RX ADMIN — Medication 10 ML: at 09:13

## 2022-05-13 RX ADMIN — METOCLOPRAMIDE 5 MG: 5 TABLET ORAL at 17:35

## 2022-05-13 RX ADMIN — SUCRALFATE 1 G: 1 TABLET ORAL at 12:50

## 2022-05-13 RX ADMIN — METOCLOPRAMIDE 5 MG: 5 TABLET ORAL at 09:13

## 2022-05-13 RX ADMIN — LEVOTHYROXINE SODIUM 150 MCG: 150 TABLET ORAL at 06:55

## 2022-05-13 RX ADMIN — ESCITALOPRAM OXALATE 10 MG: 10 TABLET ORAL at 09:12

## 2022-05-13 RX ADMIN — INSULIN ASPART 4 UNITS: 100 INJECTION, SOLUTION INTRAVENOUS; SUBCUTANEOUS at 17:34

## 2022-05-13 RX ADMIN — MIDODRINE HYDROCHLORIDE 10 MG: 5 TABLET ORAL at 17:35

## 2022-05-13 RX ADMIN — SUCRALFATE 1 G: 1 TABLET ORAL at 06:55

## 2022-05-14 LAB
ALBUMIN SERPL-MCNC: 3.8 G/DL (ref 3.5–5.2)
ANION GAP SERPL CALCULATED.3IONS-SCNC: 12.8 MMOL/L (ref 5–15)
ANISOCYTOSIS BLD QL: NORMAL
BASOPHILS # BLD AUTO: 0.11 10*3/MM3 (ref 0–0.2)
BASOPHILS NFR BLD AUTO: 1 % (ref 0–1.5)
BUN SERPL-MCNC: 23 MG/DL (ref 8–23)
BUN/CREAT SERPL: 5.5 (ref 7–25)
CALCIUM SPEC-SCNC: 9.5 MG/DL (ref 8.6–10.5)
CHLORIDE SERPL-SCNC: 96 MMOL/L (ref 98–107)
CO2 SERPL-SCNC: 22.2 MMOL/L (ref 22–29)
CREAT SERPL-MCNC: 4.16 MG/DL (ref 0.57–1)
DEPRECATED RDW RBC AUTO: 63.5 FL (ref 37–54)
EGFRCR SERPLBLD CKD-EPI 2021: 11.5 ML/MIN/1.73
EOSINOPHIL # BLD AUTO: 0.36 10*3/MM3 (ref 0–0.4)
EOSINOPHIL NFR BLD AUTO: 3.2 % (ref 0.3–6.2)
ERYTHROCYTE [DISTWIDTH] IN BLOOD BY AUTOMATED COUNT: 22.7 % (ref 12.3–15.4)
GLUCOSE BLDC GLUCOMTR-MCNC: 160 MG/DL (ref 70–130)
GLUCOSE BLDC GLUCOMTR-MCNC: 204 MG/DL (ref 70–130)
GLUCOSE BLDC GLUCOMTR-MCNC: 204 MG/DL (ref 70–130)
GLUCOSE BLDC GLUCOMTR-MCNC: 357 MG/DL (ref 70–130)
GLUCOSE SERPL-MCNC: 222 MG/DL (ref 65–99)
HCT VFR BLD AUTO: 45.2 % (ref 34–46.6)
HGB BLD-MCNC: 14.5 G/DL (ref 12–15.9)
IMM GRANULOCYTES # BLD AUTO: 0.15 10*3/MM3 (ref 0–0.05)
IMM GRANULOCYTES NFR BLD AUTO: 1.3 % (ref 0–0.5)
LYMPHOCYTES # BLD AUTO: 1.92 10*3/MM3 (ref 0.7–3.1)
LYMPHOCYTES NFR BLD AUTO: 16.9 % (ref 19.6–45.3)
MCH RBC QN AUTO: 25.5 PG (ref 26.6–33)
MCHC RBC AUTO-ENTMCNC: 32.1 G/DL (ref 31.5–35.7)
MCV RBC AUTO: 79.6 FL (ref 79–97)
MONOCYTES # BLD AUTO: 1.44 10*3/MM3 (ref 0.1–0.9)
MONOCYTES NFR BLD AUTO: 12.7 % (ref 5–12)
NEUTROPHILS NFR BLD AUTO: 64.9 % (ref 42.7–76)
NEUTROPHILS NFR BLD AUTO: 7.35 10*3/MM3 (ref 1.7–7)
NRBC BLD AUTO-RTO: 0 /100 WBC (ref 0–0.2)
PHOSPHATE SERPL-MCNC: 1.3 MG/DL (ref 2.5–4.5)
PLATELET # BLD AUTO: 185 10*3/MM3 (ref 140–450)
PMV BLD AUTO: 10.3 FL (ref 6–12)
POTASSIUM SERPL-SCNC: 3.5 MMOL/L (ref 3.5–5.2)
RBC # BLD AUTO: 5.68 10*6/MM3 (ref 3.77–5.28)
SMALL PLATELETS BLD QL SMEAR: ADEQUATE
SODIUM SERPL-SCNC: 131 MMOL/L (ref 136–145)
WBC MORPH BLD: NORMAL
WBC NRBC COR # BLD: 11.33 10*3/MM3 (ref 3.4–10.8)

## 2022-05-14 PROCEDURE — 63710000001 INSULIN ASPART PER 5 UNITS: Performed by: FAMILY MEDICINE

## 2022-05-14 PROCEDURE — 82962 GLUCOSE BLOOD TEST: CPT

## 2022-05-14 PROCEDURE — 80069 RENAL FUNCTION PANEL: CPT | Performed by: INTERNAL MEDICINE

## 2022-05-14 PROCEDURE — 99231 SBSQ HOSP IP/OBS SF/LOW 25: CPT | Performed by: FAMILY MEDICINE

## 2022-05-14 PROCEDURE — 85007 BL SMEAR W/DIFF WBC COUNT: CPT | Performed by: INTERNAL MEDICINE

## 2022-05-14 PROCEDURE — 63710000001 INSULIN DETEMIR PER 5 UNITS: Performed by: FAMILY MEDICINE

## 2022-05-14 PROCEDURE — 85025 COMPLETE CBC W/AUTO DIFF WBC: CPT | Performed by: INTERNAL MEDICINE

## 2022-05-14 RX ADMIN — MIDODRINE HYDROCHLORIDE 10 MG: 5 TABLET ORAL at 16:53

## 2022-05-14 RX ADMIN — INSULIN ASPART 4 UNITS: 100 INJECTION, SOLUTION INTRAVENOUS; SUBCUTANEOUS at 06:52

## 2022-05-14 RX ADMIN — Medication 1 TABLET: at 08:20

## 2022-05-14 RX ADMIN — Medication 10 ML: at 00:15

## 2022-05-14 RX ADMIN — LEVOTHYROXINE SODIUM 150 MCG: 150 TABLET ORAL at 06:52

## 2022-05-14 RX ADMIN — QUETIAPINE FUMARATE 12.5 MG: 25 TABLET ORAL at 00:14

## 2022-05-14 RX ADMIN — PANTOPRAZOLE SODIUM 40 MG: 40 TABLET, DELAYED RELEASE ORAL at 08:20

## 2022-05-14 RX ADMIN — METOCLOPRAMIDE 5 MG: 5 TABLET ORAL at 16:53

## 2022-05-14 RX ADMIN — Medication 10 ML: at 08:21

## 2022-05-14 RX ADMIN — PANTOPRAZOLE SODIUM 40 MG: 40 TABLET, DELAYED RELEASE ORAL at 00:15

## 2022-05-14 RX ADMIN — METOCLOPRAMIDE 5 MG: 5 TABLET ORAL at 08:20

## 2022-05-14 RX ADMIN — MIDODRINE HYDROCHLORIDE 10 MG: 5 TABLET ORAL at 06:52

## 2022-05-14 RX ADMIN — ESCITALOPRAM OXALATE 10 MG: 10 TABLET ORAL at 08:20

## 2022-05-14 RX ADMIN — METOPROLOL TARTRATE 25 MG: 25 TABLET, FILM COATED ORAL at 08:20

## 2022-05-14 RX ADMIN — METOPROLOL TARTRATE 25 MG: 25 TABLET, FILM COATED ORAL at 00:15

## 2022-05-14 RX ADMIN — PANTOPRAZOLE SODIUM 40 MG: 40 TABLET, DELAYED RELEASE ORAL at 21:11

## 2022-05-14 RX ADMIN — METOCLOPRAMIDE 5 MG: 5 TABLET ORAL at 00:14

## 2022-05-14 RX ADMIN — INSULIN ASPART 4 UNITS: 100 INJECTION, SOLUTION INTRAVENOUS; SUBCUTANEOUS at 12:03

## 2022-05-14 RX ADMIN — INSULIN DETEMIR 10 UNITS: 100 INJECTION, SOLUTION SUBCUTANEOUS at 08:21

## 2022-05-14 RX ADMIN — METOPROLOL TARTRATE 25 MG: 25 TABLET, FILM COATED ORAL at 21:11

## 2022-05-14 RX ADMIN — INSULIN ASPART 8 UNITS: 100 INJECTION, SOLUTION INTRAVENOUS; SUBCUTANEOUS at 16:53

## 2022-05-14 RX ADMIN — QUETIAPINE FUMARATE 12.5 MG: 25 TABLET ORAL at 21:11

## 2022-05-14 RX ADMIN — MIDODRINE HYDROCHLORIDE 10 MG: 5 TABLET ORAL at 12:03

## 2022-05-14 RX ADMIN — METOCLOPRAMIDE 5 MG: 5 TABLET ORAL at 21:11

## 2022-05-15 LAB
GLUCOSE BLDC GLUCOMTR-MCNC: 159 MG/DL (ref 70–130)
GLUCOSE BLDC GLUCOMTR-MCNC: 185 MG/DL (ref 70–130)
GLUCOSE BLDC GLUCOMTR-MCNC: 257 MG/DL (ref 70–130)
GLUCOSE BLDC GLUCOMTR-MCNC: 97 MG/DL (ref 70–130)

## 2022-05-15 PROCEDURE — 63710000001 ONDANSETRON PER 8 MG: Performed by: FAMILY MEDICINE

## 2022-05-15 PROCEDURE — 99232 SBSQ HOSP IP/OBS MODERATE 35: CPT | Performed by: FAMILY MEDICINE

## 2022-05-15 PROCEDURE — 82962 GLUCOSE BLOOD TEST: CPT

## 2022-05-15 PROCEDURE — 63710000001 INSULIN DETEMIR PER 5 UNITS: Performed by: FAMILY MEDICINE

## 2022-05-15 PROCEDURE — 63710000001 INSULIN ASPART PER 5 UNITS: Performed by: FAMILY MEDICINE

## 2022-05-15 RX ORDER — PROMETHAZINE HYDROCHLORIDE 12.5 MG/1
12.5 TABLET ORAL ONCE
Status: DISCONTINUED | OUTPATIENT
Start: 2022-05-15 | End: 2022-05-18 | Stop reason: HOSPADM

## 2022-05-15 RX ADMIN — INSULIN ASPART 6 UNITS: 100 INJECTION, SOLUTION INTRAVENOUS; SUBCUTANEOUS at 06:51

## 2022-05-15 RX ADMIN — METOPROLOL TARTRATE 12.5 MG: 25 TABLET, FILM COATED ORAL at 21:16

## 2022-05-15 RX ADMIN — METOCLOPRAMIDE 5 MG: 5 TABLET ORAL at 08:44

## 2022-05-15 RX ADMIN — PANTOPRAZOLE SODIUM 40 MG: 40 TABLET, DELAYED RELEASE ORAL at 21:16

## 2022-05-15 RX ADMIN — MIDODRINE HYDROCHLORIDE 10 MG: 5 TABLET ORAL at 17:16

## 2022-05-15 RX ADMIN — Medication 10 ML: at 21:17

## 2022-05-15 RX ADMIN — MIDODRINE HYDROCHLORIDE 10 MG: 5 TABLET ORAL at 11:45

## 2022-05-15 RX ADMIN — MIDODRINE HYDROCHLORIDE 10 MG: 5 TABLET ORAL at 06:51

## 2022-05-15 RX ADMIN — ONDANSETRON HYDROCHLORIDE 4 MG: 4 TABLET, FILM COATED ORAL at 11:48

## 2022-05-15 RX ADMIN — INSULIN DETEMIR 10 UNITS: 100 INJECTION, SOLUTION SUBCUTANEOUS at 08:47

## 2022-05-15 RX ADMIN — METOCLOPRAMIDE 5 MG: 5 TABLET ORAL at 17:16

## 2022-05-15 RX ADMIN — METOCLOPRAMIDE 5 MG: 5 TABLET ORAL at 21:16

## 2022-05-15 RX ADMIN — LEVOTHYROXINE SODIUM 150 MCG: 150 TABLET ORAL at 06:51

## 2022-05-15 RX ADMIN — INSULIN ASPART 2 UNITS: 100 INJECTION, SOLUTION INTRAVENOUS; SUBCUTANEOUS at 11:45

## 2022-05-15 RX ADMIN — ESCITALOPRAM OXALATE 10 MG: 10 TABLET ORAL at 08:44

## 2022-05-15 RX ADMIN — INSULIN ASPART 2 UNITS: 100 INJECTION, SOLUTION INTRAVENOUS; SUBCUTANEOUS at 17:16

## 2022-05-15 RX ADMIN — PANTOPRAZOLE SODIUM 40 MG: 40 TABLET, DELAYED RELEASE ORAL at 08:44

## 2022-05-15 RX ADMIN — Medication 10 ML: at 08:46

## 2022-05-15 RX ADMIN — QUETIAPINE FUMARATE 12.5 MG: 25 TABLET ORAL at 21:16

## 2022-05-15 RX ADMIN — METOPROLOL TARTRATE 12.5 MG: 25 TABLET, FILM COATED ORAL at 09:41

## 2022-05-16 LAB
GLUCOSE BLDC GLUCOMTR-MCNC: 204 MG/DL (ref 70–130)
GLUCOSE BLDC GLUCOMTR-MCNC: 229 MG/DL (ref 70–130)
GLUCOSE BLDC GLUCOMTR-MCNC: 255 MG/DL (ref 70–130)

## 2022-05-16 PROCEDURE — 63710000001 INSULIN ASPART PER 5 UNITS: Performed by: FAMILY MEDICINE

## 2022-05-16 PROCEDURE — 99231 SBSQ HOSP IP/OBS SF/LOW 25: CPT | Performed by: FAMILY MEDICINE

## 2022-05-16 PROCEDURE — 82962 GLUCOSE BLOOD TEST: CPT

## 2022-05-16 PROCEDURE — 97110 THERAPEUTIC EXERCISES: CPT

## 2022-05-16 PROCEDURE — 63710000001 INSULIN DETEMIR PER 5 UNITS: Performed by: FAMILY MEDICINE

## 2022-05-16 RX ORDER — FOLIC ACID/VIT B COMPLEX AND C 0.8 MG
1 TABLET ORAL DAILY
Status: DISCONTINUED | OUTPATIENT
Start: 2022-05-16 | End: 2022-05-18 | Stop reason: HOSPADM

## 2022-05-16 RX ADMIN — INSULIN DETEMIR 10 UNITS: 100 INJECTION, SOLUTION SUBCUTANEOUS at 09:31

## 2022-05-16 RX ADMIN — LEVOTHYROXINE SODIUM 150 MCG: 150 TABLET ORAL at 06:41

## 2022-05-16 RX ADMIN — MIDODRINE HYDROCHLORIDE 10 MG: 5 TABLET ORAL at 17:00

## 2022-05-16 RX ADMIN — INSULIN ASPART 4 UNITS: 100 INJECTION, SOLUTION INTRAVENOUS; SUBCUTANEOUS at 06:41

## 2022-05-16 RX ADMIN — MIDODRINE HYDROCHLORIDE 10 MG: 5 TABLET ORAL at 12:12

## 2022-05-16 RX ADMIN — ESCITALOPRAM OXALATE 10 MG: 10 TABLET ORAL at 09:30

## 2022-05-16 RX ADMIN — METOCLOPRAMIDE 5 MG: 5 TABLET ORAL at 22:34

## 2022-05-16 RX ADMIN — PANTOPRAZOLE SODIUM 40 MG: 40 TABLET, DELAYED RELEASE ORAL at 22:34

## 2022-05-16 RX ADMIN — MIDODRINE HYDROCHLORIDE 10 MG: 5 TABLET ORAL at 06:41

## 2022-05-16 RX ADMIN — QUETIAPINE FUMARATE 12.5 MG: 25 TABLET ORAL at 22:34

## 2022-05-16 RX ADMIN — Medication 10 ML: at 09:30

## 2022-05-16 RX ADMIN — METOCLOPRAMIDE 5 MG: 5 TABLET ORAL at 09:30

## 2022-05-16 RX ADMIN — METOCLOPRAMIDE 5 MG: 5 TABLET ORAL at 17:00

## 2022-05-16 RX ADMIN — METOPROLOL TARTRATE 12.5 MG: 25 TABLET, FILM COATED ORAL at 09:30

## 2022-05-16 RX ADMIN — Medication 1 TABLET: at 23:55

## 2022-05-16 RX ADMIN — Medication 10 ML: at 22:35

## 2022-05-16 RX ADMIN — METOPROLOL TARTRATE 12.5 MG: 25 TABLET, FILM COATED ORAL at 22:34

## 2022-05-16 RX ADMIN — PANTOPRAZOLE SODIUM 40 MG: 40 TABLET, DELAYED RELEASE ORAL at 09:30

## 2022-05-16 RX ADMIN — INSULIN ASPART 6 UNITS: 100 INJECTION, SOLUTION INTRAVENOUS; SUBCUTANEOUS at 12:12

## 2022-05-16 RX ADMIN — INSULIN ASPART 4 UNITS: 100 INJECTION, SOLUTION INTRAVENOUS; SUBCUTANEOUS at 17:53

## 2022-05-17 LAB
ALBUMIN SERPL-MCNC: 3.6 G/DL (ref 3.5–5.2)
ANION GAP SERPL CALCULATED.3IONS-SCNC: 12.9 MMOL/L (ref 5–15)
ANISOCYTOSIS BLD QL: NORMAL
BASOPHILS # BLD AUTO: 0.06 10*3/MM3 (ref 0–0.2)
BASOPHILS NFR BLD AUTO: 0.9 % (ref 0–1.5)
BUN SERPL-MCNC: 27 MG/DL (ref 8–23)
BUN/CREAT SERPL: 6.3 (ref 7–25)
CALCIUM SPEC-SCNC: 9.2 MG/DL (ref 8.6–10.5)
CHLORIDE SERPL-SCNC: 96 MMOL/L (ref 98–107)
CO2 SERPL-SCNC: 23.1 MMOL/L (ref 22–29)
CREAT SERPL-MCNC: 4.32 MG/DL (ref 0.57–1)
DEPRECATED RDW RBC AUTO: 67 FL (ref 37–54)
EGFRCR SERPLBLD CKD-EPI 2021: 11 ML/MIN/1.73
EOSINOPHIL # BLD AUTO: 0.26 10*3/MM3 (ref 0–0.4)
EOSINOPHIL NFR BLD AUTO: 4 % (ref 0.3–6.2)
ERYTHROCYTE [DISTWIDTH] IN BLOOD BY AUTOMATED COUNT: 23 % (ref 12.3–15.4)
GLUCOSE BLDC GLUCOMTR-MCNC: 195 MG/DL (ref 70–130)
GLUCOSE BLDC GLUCOMTR-MCNC: 209 MG/DL (ref 70–130)
GLUCOSE BLDC GLUCOMTR-MCNC: 220 MG/DL (ref 70–130)
GLUCOSE BLDC GLUCOMTR-MCNC: 236 MG/DL (ref 70–130)
GLUCOSE SERPL-MCNC: 194 MG/DL (ref 65–99)
HCT VFR BLD AUTO: 40.4 % (ref 34–46.6)
HGB BLD-MCNC: 12.7 G/DL (ref 12–15.9)
IMM GRANULOCYTES # BLD AUTO: 0.04 10*3/MM3 (ref 0–0.05)
IMM GRANULOCYTES NFR BLD AUTO: 0.6 % (ref 0–0.5)
LYMPHOCYTES # BLD AUTO: 1.02 10*3/MM3 (ref 0.7–3.1)
LYMPHOCYTES NFR BLD AUTO: 15.6 % (ref 19.6–45.3)
MCH RBC QN AUTO: 25.5 PG (ref 26.6–33)
MCHC RBC AUTO-ENTMCNC: 31.4 G/DL (ref 31.5–35.7)
MCV RBC AUTO: 81 FL (ref 79–97)
MONOCYTES # BLD AUTO: 0.86 10*3/MM3 (ref 0.1–0.9)
MONOCYTES NFR BLD AUTO: 13.1 % (ref 5–12)
NEUTROPHILS NFR BLD AUTO: 4.31 10*3/MM3 (ref 1.7–7)
NEUTROPHILS NFR BLD AUTO: 65.8 % (ref 42.7–76)
NRBC BLD AUTO-RTO: 0 /100 WBC (ref 0–0.2)
PHOSPHATE SERPL-MCNC: 1.2 MG/DL (ref 2.5–4.5)
PLAT MORPH BLD: NORMAL
PLATELET # BLD AUTO: 157 10*3/MM3 (ref 140–450)
PMV BLD AUTO: 9.5 FL (ref 6–12)
POTASSIUM SERPL-SCNC: 4 MMOL/L (ref 3.5–5.2)
RBC # BLD AUTO: 4.99 10*6/MM3 (ref 3.77–5.28)
SARS-COV-2 RNA PNL SPEC NAA+PROBE: NOT DETECTED
SODIUM SERPL-SCNC: 132 MMOL/L (ref 136–145)
WBC MORPH BLD: NORMAL
WBC NRBC COR # BLD: 6.55 10*3/MM3 (ref 3.4–10.8)

## 2022-05-17 PROCEDURE — 82962 GLUCOSE BLOOD TEST: CPT

## 2022-05-17 PROCEDURE — 97110 THERAPEUTIC EXERCISES: CPT

## 2022-05-17 PROCEDURE — 99233 SBSQ HOSP IP/OBS HIGH 50: CPT | Performed by: STUDENT IN AN ORGANIZED HEALTH CARE EDUCATION/TRAINING PROGRAM

## 2022-05-17 PROCEDURE — 80069 RENAL FUNCTION PANEL: CPT | Performed by: INTERNAL MEDICINE

## 2022-05-17 PROCEDURE — 63710000001 INSULIN ASPART PER 5 UNITS: Performed by: FAMILY MEDICINE

## 2022-05-17 PROCEDURE — 63710000001 INSULIN DETEMIR PER 5 UNITS: Performed by: FAMILY MEDICINE

## 2022-05-17 PROCEDURE — 25010000002 ONDANSETRON PER 1 MG: Performed by: FAMILY MEDICINE

## 2022-05-17 PROCEDURE — 97530 THERAPEUTIC ACTIVITIES: CPT

## 2022-05-17 PROCEDURE — 85007 BL SMEAR W/DIFF WBC COUNT: CPT | Performed by: INTERNAL MEDICINE

## 2022-05-17 PROCEDURE — 85025 COMPLETE CBC W/AUTO DIFF WBC: CPT | Performed by: INTERNAL MEDICINE

## 2022-05-17 PROCEDURE — 87635 SARS-COV-2 COVID-19 AMP PRB: CPT | Performed by: STUDENT IN AN ORGANIZED HEALTH CARE EDUCATION/TRAINING PROGRAM

## 2022-05-17 RX ORDER — SCOLOPAMINE TRANSDERMAL SYSTEM 1 MG/1
1 PATCH, EXTENDED RELEASE TRANSDERMAL
Status: DISCONTINUED | OUTPATIENT
Start: 2022-05-18 | End: 2022-05-18 | Stop reason: HOSPADM

## 2022-05-17 RX ADMIN — INSULIN DETEMIR 10 UNITS: 100 INJECTION, SOLUTION SUBCUTANEOUS at 09:08

## 2022-05-17 RX ADMIN — Medication 10 ML: at 22:18

## 2022-05-17 RX ADMIN — DIBASIC SODIUM PHOSPHATE, MONOBASIC POTASSIUM PHOSPHATE AND MONOBASIC SODIUM PHOSPHATE 2 TABLET: 852; 155; 130 TABLET ORAL at 22:18

## 2022-05-17 RX ADMIN — MIDODRINE HYDROCHLORIDE 10 MG: 5 TABLET ORAL at 06:38

## 2022-05-17 RX ADMIN — Medication 10 ML: at 09:07

## 2022-05-17 RX ADMIN — QUETIAPINE FUMARATE 12.5 MG: 25 TABLET ORAL at 22:19

## 2022-05-17 RX ADMIN — METOPROLOL TARTRATE 12.5 MG: 25 TABLET, FILM COATED ORAL at 09:07

## 2022-05-17 RX ADMIN — INSULIN ASPART 4 UNITS: 100 INJECTION, SOLUTION INTRAVENOUS; SUBCUTANEOUS at 06:38

## 2022-05-17 RX ADMIN — METOPROLOL TARTRATE 12.5 MG: 25 TABLET, FILM COATED ORAL at 22:19

## 2022-05-17 RX ADMIN — MIDODRINE HYDROCHLORIDE 10 MG: 5 TABLET ORAL at 18:06

## 2022-05-17 RX ADMIN — METOCLOPRAMIDE 5 MG: 5 TABLET ORAL at 09:06

## 2022-05-17 RX ADMIN — ESCITALOPRAM OXALATE 10 MG: 10 TABLET ORAL at 09:07

## 2022-05-17 RX ADMIN — METOCLOPRAMIDE 5 MG: 5 TABLET ORAL at 22:19

## 2022-05-17 RX ADMIN — MIDODRINE HYDROCHLORIDE 10 MG: 5 TABLET ORAL at 12:04

## 2022-05-17 RX ADMIN — LEVOTHYROXINE SODIUM 150 MCG: 150 TABLET ORAL at 06:38

## 2022-05-17 RX ADMIN — INSULIN ASPART 2 UNITS: 100 INJECTION, SOLUTION INTRAVENOUS; SUBCUTANEOUS at 12:00

## 2022-05-17 RX ADMIN — PANTOPRAZOLE SODIUM 40 MG: 40 TABLET, DELAYED RELEASE ORAL at 22:19

## 2022-05-17 RX ADMIN — INSULIN ASPART 2 UNITS: 100 INJECTION, SOLUTION INTRAVENOUS; SUBCUTANEOUS at 18:06

## 2022-05-17 RX ADMIN — METOCLOPRAMIDE 5 MG: 5 TABLET ORAL at 17:00

## 2022-05-17 RX ADMIN — ONDANSETRON 4 MG: 2 INJECTION INTRAMUSCULAR; INTRAVENOUS at 19:19

## 2022-05-17 RX ADMIN — PANTOPRAZOLE SODIUM 40 MG: 40 TABLET, DELAYED RELEASE ORAL at 09:07

## 2022-05-18 VITALS
HEIGHT: 66 IN | HEART RATE: 114 BPM | RESPIRATION RATE: 16 BRPM | OXYGEN SATURATION: 98 % | DIASTOLIC BLOOD PRESSURE: 81 MMHG | BODY MASS INDEX: 36.96 KG/M2 | TEMPERATURE: 98.4 F | WEIGHT: 230 LBS | SYSTOLIC BLOOD PRESSURE: 114 MMHG

## 2022-05-18 LAB
ANION GAP SERPL CALCULATED.3IONS-SCNC: 14.2 MMOL/L (ref 5–15)
ANISOCYTOSIS BLD QL: NORMAL
BASOPHILS # BLD AUTO: 0.06 10*3/MM3 (ref 0–0.2)
BASOPHILS NFR BLD AUTO: 0.9 % (ref 0–1.5)
BUN SERPL-MCNC: 43 MG/DL (ref 8–23)
BUN/CREAT SERPL: 7.9 (ref 7–25)
CALCIUM SPEC-SCNC: 9.5 MG/DL (ref 8.6–10.5)
CHLORIDE SERPL-SCNC: 96 MMOL/L (ref 98–107)
CO2 SERPL-SCNC: 22.8 MMOL/L (ref 22–29)
CREAT SERPL-MCNC: 5.41 MG/DL (ref 0.57–1)
DEPRECATED RDW RBC AUTO: 67.2 FL (ref 37–54)
EGFRCR SERPLBLD CKD-EPI 2021: 8.4 ML/MIN/1.73
EOSINOPHIL # BLD AUTO: 0.28 10*3/MM3 (ref 0–0.4)
EOSINOPHIL NFR BLD AUTO: 4 % (ref 0.3–6.2)
ERYTHROCYTE [DISTWIDTH] IN BLOOD BY AUTOMATED COUNT: 22.7 % (ref 12.3–15.4)
GLUCOSE BLDC GLUCOMTR-MCNC: 131 MG/DL (ref 70–130)
GLUCOSE BLDC GLUCOMTR-MCNC: 138 MG/DL (ref 70–130)
GLUCOSE BLDC GLUCOMTR-MCNC: 196 MG/DL (ref 70–130)
GLUCOSE SERPL-MCNC: 199 MG/DL (ref 65–99)
HCT VFR BLD AUTO: 38.5 % (ref 34–46.6)
HGB BLD-MCNC: 12.2 G/DL (ref 12–15.9)
HYPOCHROMIA BLD QL: NORMAL
IMM GRANULOCYTES # BLD AUTO: 0.04 10*3/MM3 (ref 0–0.05)
IMM GRANULOCYTES NFR BLD AUTO: 0.6 % (ref 0–0.5)
LYMPHOCYTES # BLD AUTO: 1.6 10*3/MM3 (ref 0.7–3.1)
LYMPHOCYTES NFR BLD AUTO: 23 % (ref 19.6–45.3)
MCH RBC QN AUTO: 25.8 PG (ref 26.6–33)
MCHC RBC AUTO-ENTMCNC: 31.7 G/DL (ref 31.5–35.7)
MCV RBC AUTO: 81.4 FL (ref 79–97)
MONOCYTES # BLD AUTO: 1.19 10*3/MM3 (ref 0.1–0.9)
MONOCYTES NFR BLD AUTO: 17.1 % (ref 5–12)
NEUTROPHILS NFR BLD AUTO: 3.8 10*3/MM3 (ref 1.7–7)
NEUTROPHILS NFR BLD AUTO: 54.4 % (ref 42.7–76)
NRBC BLD AUTO-RTO: 0 /100 WBC (ref 0–0.2)
PLATELET # BLD AUTO: 160 10*3/MM3 (ref 140–450)
PMV BLD AUTO: 9.9 FL (ref 6–12)
POTASSIUM SERPL-SCNC: 4.1 MMOL/L (ref 3.5–5.2)
RBC # BLD AUTO: 4.73 10*6/MM3 (ref 3.77–5.28)
SMALL PLATELETS BLD QL SMEAR: ADEQUATE
SODIUM SERPL-SCNC: 133 MMOL/L (ref 136–145)
WBC MORPH BLD: NORMAL
WBC NRBC COR # BLD: 6.97 10*3/MM3 (ref 3.4–10.8)

## 2022-05-18 PROCEDURE — 25010000002 HEPARIN (PORCINE) PER 1000 UNITS: Performed by: INTERNAL MEDICINE

## 2022-05-18 PROCEDURE — P9047 ALBUMIN (HUMAN), 25%, 50ML: HCPCS | Performed by: INTERNAL MEDICINE

## 2022-05-18 PROCEDURE — 80048 BASIC METABOLIC PNL TOTAL CA: CPT | Performed by: STUDENT IN AN ORGANIZED HEALTH CARE EDUCATION/TRAINING PROGRAM

## 2022-05-18 PROCEDURE — 25010000002 ALBUMIN HUMAN 25% PER 50 ML: Performed by: INTERNAL MEDICINE

## 2022-05-18 PROCEDURE — 85025 COMPLETE CBC W/AUTO DIFF WBC: CPT | Performed by: STUDENT IN AN ORGANIZED HEALTH CARE EDUCATION/TRAINING PROGRAM

## 2022-05-18 PROCEDURE — 99239 HOSP IP/OBS DSCHRG MGMT >30: CPT | Performed by: STUDENT IN AN ORGANIZED HEALTH CARE EDUCATION/TRAINING PROGRAM

## 2022-05-18 PROCEDURE — 85007 BL SMEAR W/DIFF WBC COUNT: CPT | Performed by: STUDENT IN AN ORGANIZED HEALTH CARE EDUCATION/TRAINING PROGRAM

## 2022-05-18 PROCEDURE — 63710000001 INSULIN ASPART PER 5 UNITS: Performed by: FAMILY MEDICINE

## 2022-05-18 PROCEDURE — 63710000001 INSULIN DETEMIR PER 5 UNITS: Performed by: STUDENT IN AN ORGANIZED HEALTH CARE EDUCATION/TRAINING PROGRAM

## 2022-05-18 PROCEDURE — 82962 GLUCOSE BLOOD TEST: CPT

## 2022-05-18 RX ORDER — SUCRALFATE 1 G/1
1 TABLET ORAL
Qty: 34 TABLET | Refills: 0 | Status: SHIPPED | OUTPATIENT
Start: 2022-05-18 | End: 2022-05-27

## 2022-05-18 RX ORDER — ALBUMIN (HUMAN) 12.5 G/50ML
12.5 SOLUTION INTRAVENOUS AS NEEDED
Status: DISCONTINUED | OUTPATIENT
Start: 2022-05-18 | End: 2022-05-18 | Stop reason: HOSPADM

## 2022-05-18 RX ORDER — FOLIC ACID/VIT B COMPLEX AND C 0.8 MG
1 TABLET ORAL DAILY
Qty: 30 TABLET | Refills: 12 | Status: SHIPPED | OUTPATIENT
Start: 2022-05-18 | End: 2022-06-17

## 2022-05-18 RX ORDER — HEPARIN SODIUM 1000 [USP'U]/ML
1000 INJECTION, SOLUTION INTRAVENOUS; SUBCUTANEOUS AS NEEDED
Status: DISCONTINUED | OUTPATIENT
Start: 2022-05-18 | End: 2022-05-18 | Stop reason: HOSPADM

## 2022-05-18 RX ORDER — MIDODRINE HYDROCHLORIDE 10 MG/1
10 TABLET ORAL
Qty: 90 TABLET | Refills: 0 | Status: SHIPPED | OUTPATIENT
Start: 2022-05-18

## 2022-05-18 RX ADMIN — ALBUMIN HUMAN 12.5 G: 0.25 SOLUTION INTRAVENOUS at 12:15

## 2022-05-18 RX ADMIN — INSULIN DETEMIR 15 UNITS: 100 INJECTION, SOLUTION SUBCUTANEOUS at 14:31

## 2022-05-18 RX ADMIN — HEPARIN SODIUM 1000 UNITS: 1000 INJECTION INTRAVENOUS; SUBCUTANEOUS at 13:00

## 2022-05-18 RX ADMIN — LEVOTHYROXINE SODIUM 150 MCG: 150 TABLET ORAL at 06:50

## 2022-05-18 RX ADMIN — SCOPALAMINE 1 PATCH: 1 PATCH, EXTENDED RELEASE TRANSDERMAL at 15:23

## 2022-05-18 RX ADMIN — INSULIN ASPART 2 UNITS: 100 INJECTION, SOLUTION INTRAVENOUS; SUBCUTANEOUS at 06:50

## 2022-05-18 RX ADMIN — ALBUMIN HUMAN 12.5 G: 0.25 SOLUTION INTRAVENOUS at 12:00

## 2022-05-18 RX ADMIN — METOCLOPRAMIDE 5 MG: 5 TABLET ORAL at 14:30

## 2022-05-18 RX ADMIN — METOCLOPRAMIDE 5 MG: 5 TABLET ORAL at 15:27

## 2022-05-18 RX ADMIN — ALBUMIN HUMAN 12.5 G: 0.25 SOLUTION INTRAVENOUS at 12:30

## 2022-05-18 RX ADMIN — PANTOPRAZOLE SODIUM 40 MG: 40 TABLET, DELAYED RELEASE ORAL at 14:29

## 2022-05-18 RX ADMIN — Medication 10 ML: at 14:30

## 2022-05-18 RX ADMIN — Medication 1 TABLET: at 14:29

## 2022-05-18 RX ADMIN — ESCITALOPRAM OXALATE 10 MG: 10 TABLET ORAL at 14:29

## 2022-05-18 RX ADMIN — MIDODRINE HYDROCHLORIDE 10 MG: 5 TABLET ORAL at 06:50

## 2022-05-26 ENCOUNTER — NURSING HOME (OUTPATIENT)
Dept: INTERNAL MEDICINE | Facility: CLINIC | Age: 62
End: 2022-05-26

## 2022-05-26 VITALS
WEIGHT: 238.7 LBS | OXYGEN SATURATION: 95 % | TEMPERATURE: 97.2 F | BODY MASS INDEX: 38.53 KG/M2 | RESPIRATION RATE: 16 BRPM | DIASTOLIC BLOOD PRESSURE: 82 MMHG | HEART RATE: 96 BPM | SYSTOLIC BLOOD PRESSURE: 127 MMHG

## 2022-05-26 DIAGNOSIS — N18.6 ESRD (END STAGE RENAL DISEASE): ICD-10-CM

## 2022-05-26 DIAGNOSIS — I50.32 CHRONIC DIASTOLIC CONGESTIVE HEART FAILURE: ICD-10-CM

## 2022-05-26 DIAGNOSIS — E03.9 ACQUIRED HYPOTHYROIDISM: ICD-10-CM

## 2022-05-26 DIAGNOSIS — I10 ESSENTIAL HYPERTENSION: ICD-10-CM

## 2022-05-26 DIAGNOSIS — K21.01 GASTROESOPHAGEAL REFLUX DISEASE WITH ESOPHAGITIS AND HEMORRHAGE: ICD-10-CM

## 2022-05-26 DIAGNOSIS — E11.21 TYPE 2 DIABETES MELLITUS WITH NEPHROPATHY: ICD-10-CM

## 2022-05-26 DIAGNOSIS — R60.0 BILATERAL EDEMA OF LOWER EXTREMITY: ICD-10-CM

## 2022-05-26 DIAGNOSIS — D62 ACUTE BLOOD LOSS ANEMIA: Primary | ICD-10-CM

## 2022-05-26 PROCEDURE — 99306 1ST NF CARE HIGH MDM 50: CPT | Performed by: INTERNAL MEDICINE

## 2022-05-26 NOTE — PROGRESS NOTES
Nursing Home History and Physical       Praful DO Geremias []  SNEHA Salmon []  [address] [place] [address]  Phone: [phone]  Fax: [phone] Beto Aguayo MD []  Rafiq Baron DO [x]   [address] [place] [address]  Phone: [phone]  Fax: [phone]     PATIENT NAME: Millicent Mckeon                                                                          YOB: 1960           DATE OF SERVICE: 05/26/2022  FACILITY:  [x]  [facility]   [] [place]    [] [facility]   [] [facility]   []  [facility]   []  Other ______________________________________________________________________    CHIEF COMPLAINT:  Nursing facility admission      HISTORY OF PRESENT ILLNESS:   Patient is a 61-year-old white female with a history of end-stage renal disease on hemodialysis, atrial fibrillation, morbid obesity, diastolic congestive heart failure, and type 2 diabetes mellitus who was recently hospitalized due to coffee-ground emesis.  Patient was hospitalized at [facility] and was found to have grade C esophagitis with esophageal ulcers by gastroenterology.  She was started on EPI as well as Reglan for presumed underlying gastroparesis.  She was managed by nephrology for hemodialysis during her hospitalization.    On exam today, patient was seen while she was in physical therapy.  She was in good spirits and seemed motivated to gain her strength back.  She does remain primarily wheelchair-bound.  She has a tendency to have significant redness in her legs which is worse when sitting but resolves by the morning when she is laying for long periods of time.    PAST MEDICAL & SURGICAL HISTORY:   Past Medical History:   Diagnosis Date   • A-fib (HCC)    • Anemia 10/02/2018   • Diabetes mellitus (HCC)    • Disease of thyroid gland    • GERD (gastroesophageal reflux disease)    • Gout    • History of transfusion    • Hypertension    • Impaired functional mobility,  balance, gait, and endurance       Past Surgical History:   Procedure Laterality Date   • ENDOSCOPY N/A 5/2/2022    Procedure: ESOPHAGOGASTRODUODENOSCOPY WITH BIOPSY;  Surgeon: Jacob Chance MD;  Location: [facility] ENDOSCOPY;  Service: Gastroenterology;  Laterality: N/A;   • EYE SURGERY     • INCISION AND DRAINAGE LEG Right 1/14/2019    Procedure: Right heel incision and drainage with graft application;  Surgeon: Ar Rueda DPM;  Location: [facility] OR;  Service: Podiatry   • INSERTION HEMODIALYSIS CATHETER N/A 4/5/2022    Procedure: HEMODIALYSIS CATHETER INSERTION;  Surgeon: Jean Carlos Guadalupe MD;  Location: [facility] OR;  Service: General;  Laterality: N/A;   • LEG SURGERY           MEDICATIONS:  I have reviewed and reconciled the patients medication list in the patients chart at the [facility] on 05/26/2022.      ALLERGIES:  Allergies   Allergen Reactions   • Metformin And Related Anaphylaxis     HA, diarrhea, throat swelling   • Metformin GI Intolerance         SOCIAL HISTORY:  Social History     Socioeconomic History   • Marital status: Single   Tobacco Use   • Smoking status: Never Smoker   • Smokeless tobacco: Never Used   Vaping Use   • Vaping Use: Never used   Substance and Sexual Activity   • Alcohol use: No   • Drug use: No   • Sexual activity: Defer       FAMILY HISTORY:  Family History   Problem Relation Age of Onset   • Asthma Mother    • Hypertension Father    • Stroke Father         REVIEW OF SYSTEMS:  Review of Systems   Constitutional: Negative for chills, fatigue and fever.   HENT: Negative for congestion, ear pain, rhinorrhea, sinus pressure and sore throat.    Eyes: Negative for visual disturbance.   Respiratory: Negative for cough, chest tightness, shortness of breath and wheezing.    Cardiovascular: Negative for chest pain, palpitations and leg swelling.   Gastrointestinal: Negative for abdominal pain, blood in stool, constipation, diarrhea, nausea and vomiting.    Endocrine: Negative for polydipsia and polyuria.   Genitourinary: Negative for dysuria and hematuria.   Musculoskeletal: Negative for arthralgias and back pain.   Skin: Negative for rash.   Neurological: Negative for dizziness, light-headedness, numbness and headaches.   Psychiatric/Behavioral: Negative for dysphoric mood and sleep disturbance. The patient is not nervous/anxious.          PHYSICAL EXAMINATION:   VITAL SIGNS: /82   Pulse 96   Temp 97.2 °F (36.2 °C)   Resp 16   Wt 108 kg (238 lb 11.2 oz)   SpO2 95%   BMI 38.53 kg/m²     Physical Exam  Vitals and nursing note reviewed.   Constitutional:       Appearance: Normal appearance. She is well-developed. She is obese.   HENT:      Head: Normocephalic and atraumatic.      Nose: Nose normal.      Mouth/Throat:      Mouth: Mucous membranes are moist.      Pharynx: No oropharyngeal exudate.   Eyes:      General: No scleral icterus.     Conjunctiva/sclera: Conjunctivae normal.      Pupils: Pupils are equal, round, and reactive to light.   Neck:      Thyroid: No thyromegaly.   Cardiovascular:      Rate and Rhythm: Normal rate. Rhythm irregular.      Heart sounds: Normal heart sounds. No murmur heard.    No friction rub. No gallop.   Pulmonary:      Effort: Pulmonary effort is normal. No respiratory distress.      Breath sounds: Normal breath sounds. No wheezing.   Abdominal:      General: Bowel sounds are normal. There is no distension.      Palpations: Abdomen is soft.      Tenderness: There is no abdominal tenderness.   Musculoskeletal:         General: No deformity or signs of injury.      Cervical back: Normal range of motion and neck supple.   Lymphadenopathy:      Cervical: No cervical adenopathy.   Skin:     General: Skin is warm and dry.      Findings: No rash.   Neurological:      Mental Status: She is alert and oriented to person, place, and time.   Psychiatric:         Mood and Affect: Mood normal.         Behavior: Behavior normal.          RECORDS REVIEW:   Discharge Summary from [facility] 5/18/2022  Labs: 5/19/22 hemoglobin 12.4, MCV 78.6, A1c 8.0, sodium 132, TSH in normal range.    ASSESSMENT   Diagnoses and all orders for this visit:    1. Acute blood loss anemia (Primary)    2. Gastroesophageal reflux disease with esophagitis and hemorrhage    3. Essential hypertension    4. Type 2 diabetes mellitus with nephropathy (HCC)    5. Acquired hypothyroidism    6. Bilateral edema of lower extremity    7. Chronic diastolic congestive heart failure (HCC)    8. ESRD (end stage renal disease) ([facility])        PLAN    Acute blood loss anemia secondary to esophageal ulcers  - Monitor blood counts.  They have been stable since hospitalization.  Patient is on a regimen of iron supplements twice daily.    GERD  - Continue PPI.    Diabetic gastroparesis  - On Reglan regimen per gastroenterology.    Essential hypertension  - Stable controlled with current medication regimen.    End-stage renal disease  - Continue hemodialysis as scheduled 3 times a week.    Atrial fibrillation  - Stable control of rhythm.  Continue current cardiac medications.  - Continue Eliquis after 1 week of admission to this facility.    Anxiety  - Stable on Lexapro and Seroquel.    Hypothyroidism  - TSH is at goal.  Continue current dose of Synthroid.    Type 2 diabetes mellitus  - Fair control at this time.  Continue monitoring glucose levels and adjust medication regimen during her stay.         [x]  Discussed Patient in detail with nursing/staff, addressed all needs today.     [x]  Plan of Care Reviewed   [x]  PT/OT Reviewed   [x]  Order Changes  []  Discharge Plans Reviewed  [x]  Advance Directive on file with Nursing Home.   [x]  POA on file with Nursing Home.    [x]  Code Status listed and reviewed.       Rafiq Baron DO.  5/26/2022      **Part of this note may be an electronic transcription/translation of spoken language to printed text using the Dragon  Dictation System.**

## 2022-06-20 ENCOUNTER — NURSING HOME (OUTPATIENT)
Dept: INTERNAL MEDICINE | Facility: CLINIC | Age: 62
End: 2022-06-20

## 2022-06-20 DIAGNOSIS — R60.0 BILATERAL EDEMA OF LOWER EXTREMITY: ICD-10-CM

## 2022-06-20 DIAGNOSIS — E03.9 ACQUIRED HYPOTHYROIDISM: ICD-10-CM

## 2022-06-20 DIAGNOSIS — N18.6 ESRD (END STAGE RENAL DISEASE): ICD-10-CM

## 2022-06-20 DIAGNOSIS — E11.21 TYPE 2 DIABETES MELLITUS WITH NEPHROPATHY: ICD-10-CM

## 2022-06-20 DIAGNOSIS — I10 ESSENTIAL HYPERTENSION: ICD-10-CM

## 2022-06-20 DIAGNOSIS — I50.32 CHRONIC DIASTOLIC CONGESTIVE HEART FAILURE: ICD-10-CM

## 2022-06-20 DIAGNOSIS — K21.01 GASTROESOPHAGEAL REFLUX DISEASE WITH ESOPHAGITIS AND HEMORRHAGE: Primary | ICD-10-CM

## 2022-06-20 PROCEDURE — 99309 SBSQ NF CARE MODERATE MDM 30: CPT | Performed by: INTERNAL MEDICINE

## 2022-06-21 VITALS
BODY MASS INDEX: 37.11 KG/M2 | TEMPERATURE: 97.5 F | WEIGHT: 229.9 LBS | HEART RATE: 125 BPM | SYSTOLIC BLOOD PRESSURE: 118 MMHG | OXYGEN SATURATION: 96 % | RESPIRATION RATE: 16 BRPM | DIASTOLIC BLOOD PRESSURE: 72 MMHG

## 2022-07-03 NOTE — PROGRESS NOTES
Nursing Home Progress Note        Praful Archuleta DO []  SNEHA Salmon []  [address] [place] [address]  Phone: [phone]  Fax: [phone] Beto Aguayo MD []  Rafiq Baron DO [x]   [address] [place] [address]  Phone: [phone]  Fax: [phone]     PATIENT NAME: Millicent Mckeon                                                                          YOB: 1960           DATE OF SERVICE: 06/20/2022  FACILITY:  [x] [place]   [] [place]   [] [facility]   [] [facility]  []  [facility]  [] Other ______________________________________________________________________     CHIEF COMPLAINT:  Chronic Medical Management      HISTORY OF PRESENT ILLNESS:   Patient was resting comfortably with no new complaints or concerns.  She continues to eat liberally despite having higher glucose levels.  She remains compliant with dialysis three times a week but has persistent dependent edema which has been bothering her.  She has not been elevating while on a chair.  Denied nausea today.     PAST MEDICAL & SURGICAL HISTORY:   Past Medical History:   Diagnosis Date   • A-fib (HCC)    • Anemia 10/02/2018   • Diabetes mellitus (HCC)    • Disease of thyroid gland    • GERD (gastroesophageal reflux disease)    • Gout    • History of transfusion    • Hypertension    • Impaired functional mobility, balance, gait, and endurance       Past Surgical History:   Procedure Laterality Date   • ENDOSCOPY N/A 5/2/2022    Procedure: ESOPHAGOGASTRODUODENOSCOPY WITH BIOPSY;  Surgeon: Jacob Chance MD;  Location: [facility] ENDOSCOPY;  Service: Gastroenterology;  Laterality: N/A;   • EYE SURGERY     • INCISION AND DRAINAGE LEG Right 1/14/2019    Procedure: Right heel incision and drainage with graft application;  Surgeon: Ar Rueda DPM;  Location: [facility] OR;  Service: Podiatry   • INSERTION HEMODIALYSIS CATHETER N/A 4/5/2022    Procedure: HEMODIALYSIS CATHETER INSERTION;   Surgeon: Jean Carlos Guadalupe MD;  Location: [facility];  Service: General;  Laterality: N/A;   • LEG SURGERY           MEDICATIONS:  I have reviewed and reconciled the patients medication list in the patients chart at the skilled nursing facility today, 06/20/2022.      ALLERGIES:  Allergies   Allergen Reactions   • Metformin And Related Anaphylaxis     HA, diarrhea, throat swelling   • Metformin GI Intolerance         SOCIAL HISTORY:  Social History     Socioeconomic History   • Marital status: Single   Tobacco Use   • Smoking status: Never Smoker   • Smokeless tobacco: Never Used   Vaping Use   • Vaping Use: Never used   Substance and Sexual Activity   • Alcohol use: No   • Drug use: No   • Sexual activity: Defer       FAMILY HISTORY:  Family History   Problem Relation Age of Onset   • Asthma Mother    • Hypertension Father    • Stroke Father        REVIEW OF SYSTEMS:  Review of Systems   Constitutional: Negative for chills, fatigue and fever.   HENT: Negative for congestion, ear pain, rhinorrhea, sinus pressure and sore throat.    Eyes: Negative for visual disturbance.   Respiratory: Negative for cough, chest tightness, shortness of breath and wheezing.    Cardiovascular: Negative for chest pain, palpitations and leg swelling.   Gastrointestinal: Negative for abdominal pain, blood in stool, constipation, diarrhea, nausea and vomiting.   Endocrine: Negative for polydipsia and polyuria.   Genitourinary: Negative for dysuria and hematuria.   Musculoskeletal: Negative for arthralgias and back pain.   Skin: Negative for rash.   Neurological: Negative for dizziness, light-headedness, numbness and headaches.   Psychiatric/Behavioral: Negative for dysphoric mood and sleep disturbance. The patient is not nervous/anxious.           PHYSICAL EXAMINATION:     VITAL SIGNS:  /72   Pulse (!) 125   Temp 97.5 °F (36.4 °C)   Resp 16   Wt 104 kg (229 lb 14.4 oz)   SpO2 96%   BMI 37.11 kg/m²     Physical Exam  Vitals and  nursing note reviewed.   Constitutional:       Appearance: Normal appearance. She is well-developed. She is obese.   HENT:      Head: Normocephalic and atraumatic.      Nose: Nose normal.      Mouth/Throat:      Mouth: Mucous membranes are moist.      Pharynx: No oropharyngeal exudate.   Eyes:      General: No scleral icterus.     Conjunctiva/sclera: Conjunctivae normal.      Pupils: Pupils are equal, round, and reactive to light.   Neck:      Thyroid: No thyromegaly.   Cardiovascular:      Rate and Rhythm: Normal rate. Rhythm irregular.      Heart sounds: Normal heart sounds. No murmur heard.    No friction rub. No gallop.   Pulmonary:      Effort: Pulmonary effort is normal. No respiratory distress.      Breath sounds: Normal breath sounds. No wheezing.   Abdominal:      General: Bowel sounds are normal. There is no distension.      Palpations: Abdomen is soft.      Tenderness: There is no abdominal tenderness.   Musculoskeletal:         General: No deformity or signs of injury.      Cervical back: Normal range of motion and neck supple.   Lymphadenopathy:      Cervical: No cervical adenopathy.   Skin:     General: Skin is warm and dry.      Findings: No rash.   Neurological:      Mental Status: She is alert and oriented to person, place, and time.   Psychiatric:         Mood and Affect: Mood normal.         Behavior: Behavior normal.         RECORDS REVIEW:        ASSESSMENT     Diagnoses and all orders for this visit:    1. Gastroesophageal reflux disease with esophagitis and hemorrhage (Primary)    2. Essential hypertension    3. Type 2 diabetes mellitus with nephropathy (HCC)    4. Acquired hypothyroidism    5. Bilateral edema of lower extremity    6. Chronic diastolic congestive heart failure (HCC)    7. ESRD (end stage renal disease) ([facility])        PLAN    Type 2 diabetes mellitus  -Glucose levels remain high, increase Levemir to 15U    Bilateral Lower extremity Edema  - discussed the need to try ace wraps to  legs daily, off at night. Nurses will try to encourage use.     Acute blood loss anemia secondary to esophageal ulcers  -Cont iron supplements.  CBC being monitored by nephrology.      GERD  - Continue PPI.     Diabetic gastroparesis  - On Reglan regimen per gastroenterology. Symptoms seem to be well controlled as appetite is improved and glucose levels are running higher.      Essential hypertension  - Stable controlled with current medication regimen.     End-stage renal disease  - Continue hemodialysis as scheduled 3 times a week.     Atrial fibrillation  - Stable control of rhythm.  Continue current cardiac medications.  - Continue Eliquis after 1 week of admission to this facility.     Anxiety  - Stable on Lexapro and Seroquel.     Hypothyroidism  - TSH is at goal.  Continue current dose of Synthroid.            [x]  Discussed Patient in detail with nursing/staff, addressed all needs today.     [x]  Plan of Care Reviewed   []  PT/OT Reviewed   [x]  Order Changes  []  Discharge Plans Reviewed  [x]  Advance Directive on file with Nursing Home.   [x]  POA on file with Nursing Home.    [x]  Code Status listed and reviewed.     I confirm accuracy of unchanged data/findings including physical exam and plan which have been carried forward from previous visit, as well as I have updated appropriately those that have changed        Rafiq Baron DO.  7/3/2022

## 2022-07-20 LAB — GLUCOSE BLDC GLUCOMTR-MCNC: 90 MG/DL (ref 70–130)

## 2022-07-28 ENCOUNTER — NURSING HOME (OUTPATIENT)
Dept: INTERNAL MEDICINE | Facility: CLINIC | Age: 62
End: 2022-07-28

## 2022-07-28 VITALS
DIASTOLIC BLOOD PRESSURE: 83 MMHG | OXYGEN SATURATION: 93 % | TEMPERATURE: 97.9 F | RESPIRATION RATE: 18 BRPM | HEART RATE: 108 BPM | SYSTOLIC BLOOD PRESSURE: 152 MMHG

## 2022-07-28 DIAGNOSIS — I50.32 CHRONIC DIASTOLIC CONGESTIVE HEART FAILURE: ICD-10-CM

## 2022-07-28 DIAGNOSIS — R60.0 BILATERAL EDEMA OF LOWER EXTREMITY: ICD-10-CM

## 2022-07-28 DIAGNOSIS — K21.01 GASTROESOPHAGEAL REFLUX DISEASE WITH ESOPHAGITIS AND HEMORRHAGE: Primary | ICD-10-CM

## 2022-07-28 DIAGNOSIS — E03.9 ACQUIRED HYPOTHYROIDISM: ICD-10-CM

## 2022-07-28 DIAGNOSIS — I10 ESSENTIAL HYPERTENSION: ICD-10-CM

## 2022-07-28 DIAGNOSIS — N18.6 ESRD (END STAGE RENAL DISEASE): ICD-10-CM

## 2022-07-28 DIAGNOSIS — E11.21 TYPE 2 DIABETES MELLITUS WITH NEPHROPATHY: ICD-10-CM

## 2022-07-28 PROCEDURE — 99309 SBSQ NF CARE MODERATE MDM 30: CPT | Performed by: INTERNAL MEDICINE

## 2022-07-31 NOTE — PROGRESS NOTES
Nursing Home Progress Note        Praful Archuleta DO []  SNEHA Salmon []  [address] [place] [address]  Phone: [phone]  Fax: [phone] Beto Aguayo MD []  Rafiq Baron DO [x]   [address] [place] [address]  Phone: [phone]  Fax: [phone]     PATIENT NAME: Millicent Mckeon                                                                          YOB: 1960           DATE OF SERVICE: 07/28/2022  FACILITY:  [x] [place]   [] [place]   [] [facility]   [] [facility]  []  [facility]  [] Other ______________________________________________________________________     CHIEF COMPLAINT:  Chronic Medical Management      HISTORY OF PRESENT ILLNESS:   Patient was seen for routine care and management.  She has unfortunately been having some intermittent but persistent loose stools over the last week.  He denies any fevers or chills or abdominal pain.  She also did have an episode of vaginal bleeding    PAST MEDICAL & SURGICAL HISTORY:   Past Medical History:   Diagnosis Date   • A-fib (HCC)    • Anemia 10/02/2018   • Diabetes mellitus (HCC)    • Disease of thyroid gland    • GERD (gastroesophageal reflux disease)    • Gout    • History of transfusion    • Hypertension    • Impaired functional mobility, balance, gait, and endurance       Past Surgical History:   Procedure Laterality Date   • ENDOSCOPY N/A 5/2/2022    Procedure: ESOPHAGOGASTRODUODENOSCOPY WITH BIOPSY;  Surgeon: Jacob Chance MD;  Location: [facility] ENDOSCOPY;  Service: Gastroenterology;  Laterality: N/A;   • EYE SURGERY     • INCISION AND DRAINAGE LEG Right 1/14/2019    Procedure: Right heel incision and drainage with graft application;  Surgeon: Ar Rueda DPM;  Location: [facility] OR;  Service: Podiatry   • INSERTION HEMODIALYSIS CATHETER N/A 4/5/2022    Procedure: HEMODIALYSIS CATHETER INSERTION;  Surgeon: Jean Carlos Guadalupe MD;  Location: [facility] OR;  Service: General;   Laterality: N/A;   • LEG SURGERY           MEDICATIONS:  I have reviewed and reconciled the patients medication list in the patients chart at the [facility] nursing facility today, 07/28/2022.      ALLERGIES:  Allergies   Allergen Reactions   • Metformin And Related Anaphylaxis     HA, diarrhea, throat swelling   • Metformin GI Intolerance         SOCIAL HISTORY:  Social History     Socioeconomic History   • Marital status: Single   Tobacco Use   • Smoking status: Never Smoker   • Smokeless tobacco: Never Used   Vaping Use   • Vaping Use: Never used   Substance and Sexual Activity   • Alcohol use: No   • Drug use: No   • Sexual activity: Defer       FAMILY HISTORY:  Family History   Problem Relation Age of Onset   • Asthma Mother    • Hypertension Father    • Stroke Father        REVIEW OF SYSTEMS:  Review of Systems   Constitutional: Negative for chills, fatigue and fever.   HENT: Negative for congestion, ear pain, rhinorrhea, sinus pressure and sore throat.    Eyes: Negative for visual disturbance.   Respiratory: Negative for cough, chest tightness, shortness of breath and wheezing.    Cardiovascular: Negative for chest pain, palpitations and leg swelling.   Gastrointestinal: Positive for diarrhea. Negative for abdominal pain, blood in stool, constipation, nausea and vomiting.   Endocrine: Negative for polydipsia and polyuria.   Genitourinary: Positive for vaginal bleeding. Negative for dysuria and hematuria.   Musculoskeletal: Negative for arthralgias and back pain.   Skin: Negative for rash.   Neurological: Negative for dizziness, light-headedness, numbness and headaches.   Psychiatric/Behavioral: Negative for dysphoric mood and sleep disturbance. The patient is not nervous/anxious.           PHYSICAL EXAMINATION:     VITAL SIGNS:  /83   Pulse 108   Temp 97.9 °F (36.6 °C)   Resp 18   SpO2 93%     Physical Exam  Vitals and nursing note reviewed.   Constitutional:       Appearance: Normal appearance. She is  well-developed. She is obese.   HENT:      Head: Normocephalic and atraumatic.      Nose: Nose normal.      Mouth/Throat:      Mouth: Mucous membranes are moist.      Pharynx: No oropharyngeal exudate.   Eyes:      General: No scleral icterus.     Conjunctiva/sclera: Conjunctivae normal.      Pupils: Pupils are equal, round, and reactive to light.   Neck:      Thyroid: No thyromegaly.   Cardiovascular:      Rate and Rhythm: Normal rate. Rhythm irregular.      Heart sounds: Normal heart sounds. No murmur heard.    No friction rub. No gallop.   Pulmonary:      Effort: Pulmonary effort is normal. No respiratory distress.      Breath sounds: Normal breath sounds. No wheezing.   Abdominal:      General: Bowel sounds are normal. There is no distension.      Palpations: Abdomen is soft.      Tenderness: There is no abdominal tenderness.   Musculoskeletal:         General: No deformity or signs of injury.      Cervical back: Normal range of motion and neck supple.      Right lower leg: Edema present.      Left lower leg: Edema present.   Lymphadenopathy:      Cervical: No cervical adenopathy.   Skin:     General: Skin is warm and dry.      Findings: No rash.   Neurological:      Mental Status: She is alert and oriented to person, place, and time.   Psychiatric:         Mood and Affect: Mood normal.         Behavior: Behavior normal.         RECORDS REVIEW:       ASSESSMENT     Diagnoses and all orders for this visit:    1. Gastroesophageal reflux disease with esophagitis and hemorrhage (Primary)    2. Essential hypertension    3. Type 2 diabetes mellitus with nephropathy (HCC)    4. Acquired hypothyroidism    5. Bilateral edema of lower extremity    6. Chronic diastolic congestive heart failure (HCC)    7. ESRD (end stage renal disease) ([facility])        PLAN  Vaginal Bleeding  - CT imaging 5/7/22 was suggesting calcified uterine fibroids.  If patient has further bleeding (which has currently resolved) we may consider gynecology  referral.    Diarrhea  - Concern for possible C. difficile.  Stool studies requested today.    Type 2 diabetes mellitus  -Glucose levels remains stable with levemir and SSI.      Bilateral Lower Extremity Edema  - better control with elevation and compression     Acute blood loss anemia secondary to esophageal ulcers  -Cont iron supplements.  CBC being monitored by nephrology regularly.      GERD  - Continue PPI.     Diabetic gastroparesis  - On Reglan regimen per gastroenterology. Symptoms seem to be well controlled as appetite is improved and glucose levels are running higher.      Essential hypertension  - Stable controlled with current medication regimen.     End-stage renal disease  - Continue hemodialysis as scheduled 3 times a week.     Atrial fibrillation  - Stable control of rhythm.  Continue current cardiac medications.  - Continue Eliquis.    Anxiety  - Stable on Lexapro and Seroquel.     Hypothyroidism  - TSH is at goal.  Continue current dose of Synthroid.    [x]  Discussed Patient in detail with nursing/staff, addressed all needs today.     [x]  Plan of Care Reviewed   []  PT/OT Reviewed   []  Order Changes  []  Discharge Plans Reviewed  [x]  Advance Directive on file with Nursing Home.   [x]  POA on file with Nursing Home.    [x]  Code Status listed and reviewed.     I confirm accuracy of unchanged data/findings including physical exam and plan which have been carried forward from previous visit, as well as I have updated appropriately those that have changed        aRfiq Baron DO.  7/30/2022

## 2022-09-29 ENCOUNTER — NURSING HOME (OUTPATIENT)
Dept: INTERNAL MEDICINE | Facility: CLINIC | Age: 62
End: 2022-09-29

## 2022-09-29 VITALS
OXYGEN SATURATION: 95 % | SYSTOLIC BLOOD PRESSURE: 143 MMHG | RESPIRATION RATE: 18 BRPM | HEART RATE: 105 BPM | TEMPERATURE: 97 F | DIASTOLIC BLOOD PRESSURE: 72 MMHG

## 2022-09-29 DIAGNOSIS — E11.21 TYPE 2 DIABETES MELLITUS WITH NEPHROPATHY: ICD-10-CM

## 2022-09-29 DIAGNOSIS — R60.0 BILATERAL EDEMA OF LOWER EXTREMITY: ICD-10-CM

## 2022-09-29 DIAGNOSIS — D62 ACUTE BLOOD LOSS ANEMIA: ICD-10-CM

## 2022-09-29 DIAGNOSIS — K21.01 GASTROESOPHAGEAL REFLUX DISEASE WITH ESOPHAGITIS AND HEMORRHAGE: Primary | ICD-10-CM

## 2022-09-29 DIAGNOSIS — N18.6 ESRD (END STAGE RENAL DISEASE): ICD-10-CM

## 2022-09-29 DIAGNOSIS — N93.9 VAGINAL BLEEDING: ICD-10-CM

## 2022-09-29 DIAGNOSIS — E03.9 ACQUIRED HYPOTHYROIDISM: ICD-10-CM

## 2022-09-29 DIAGNOSIS — I10 ESSENTIAL HYPERTENSION: ICD-10-CM

## 2022-09-29 PROCEDURE — 99309 SBSQ NF CARE MODERATE MDM 30: CPT | Performed by: INTERNAL MEDICINE

## 2022-10-03 ENCOUNTER — TRANSCRIBE ORDERS (OUTPATIENT)
Dept: ADMINISTRATIVE | Facility: HOSPITAL | Age: 62
End: 2022-10-03

## 2022-10-03 DIAGNOSIS — N18.6 END STAGE RENAL DISEASE: Primary | ICD-10-CM

## 2022-10-03 NOTE — PROGRESS NOTES
Nursing Home Progress Note        Praful Archuleta DO []  SNEHA Salmon []  [address] [facility], [place] [address]  Phone: [phone]  Fax: [phone] Beto Aguayo MD []  Rafiq Baron DO [x]   [address] [place] [address]  Phone: [phone]  Fax: [phone]     PATIENT NAME: Millicent Mckeon                                                                          YOB: 1960           DATE OF SERVICE: 09/29/2022  FACILITY:  [] [place]   [] [place]   [] [facility]   [] [facility]  []  [facility]  [] Other ______________________________________________________________________     CHIEF COMPLAINT:  Chronic Medical Management      HISTORY OF PRESENT ILLNESS:   Patient was sitting up comfortably in her wheelchair.  She was in good spirits and had no new complaints or concerns.  She denied any recent vaginal bleeding or issues with diarrhea.  Glucose has been in reasonable control over the last couple of months.  She denies issues with gastroparesis.    PAST MEDICAL & SURGICAL HISTORY:   Past Medical History:   Diagnosis Date   • A-fib (HCC)    • Anemia 10/02/2018   • Diabetes mellitus (HCC)    • Disease of thyroid gland    • GERD (gastroesophageal reflux disease)    • Gout    • History of transfusion    • Hypertension    • Impaired functional mobility, balance, gait, and endurance       Past Surgical History:   Procedure Laterality Date   • ENDOSCOPY N/A 5/2/2022    Procedure: ESOPHAGOGASTRODUODENOSCOPY WITH BIOPSY;  Surgeon: Jacob Chance MD;  Location: [facility] ENDOSCOPY;  Service: Gastroenterology;  Laterality: N/A;   • EYE SURGERY     • INCISION AND DRAINAGE LEG Right 1/14/2019    Procedure: Right heel incision and drainage with graft application;  Surgeon: Ar Rueda DPM;  Location: [facility] OR;  Service: Podiatry   • INSERTION HEMODIALYSIS CATHETER N/A 4/5/2022    Procedure: HEMODIALYSIS CATHETER INSERTION;  Surgeon: Jean Carlos Guadalupe MD;   Location: [facility] OR;  Service: General;  Laterality: N/A;   • LEG SURGERY           MEDICATIONS:  I have reviewed and reconciled the patients medication list in the patients chart at the skilled nursing facility today, 09/29/2022.      ALLERGIES:  Allergies   Allergen Reactions   • Metformin And Related Anaphylaxis     HA, diarrhea, throat swelling   • Metformin GI Intolerance         SOCIAL HISTORY:  Social History     Socioeconomic History   • Marital status: Single   Tobacco Use   • Smoking status: Never Smoker   • Smokeless tobacco: Never Used   Vaping Use   • Vaping Use: Never used   Substance and Sexual Activity   • Alcohol use: No   • Drug use: No   • Sexual activity: Defer       FAMILY HISTORY:  Family History   Problem Relation Age of Onset   • Asthma Mother    • Hypertension Father    • Stroke Father        REVIEW OF SYSTEMS:  Review of Systems       PHYSICAL EXAMINATION:     VITAL SIGNS:  /72   Pulse 105   Temp 97 °F (36.1 °C)   Resp 18   SpO2 95%     Physical Exam    RECORDS REVIEW:        ASSESSMENT     Diagnoses and all orders for this visit:    1. Gastroesophageal reflux disease with esophagitis and hemorrhage (Primary)    2. Essential hypertension    3. Type 2 diabetes mellitus with nephropathy (HCC)    4. Bilateral edema of lower extremity    5. Acquired hypothyroidism    6. ESRD (end stage renal disease) (HCC)    7. Acute blood loss anemia    8. Vaginal bleeding        PLAN  End-stage renal disease  - Continue hemodialysis as scheduled 3 times a week.  Ensure patient has CBC, CMP, A1c every 3 months.    Vaginal Bleeding  - CT imaging 5/7/22 was suggesting calcified uterine fibroids.  No signs of recurrence at this time.  We will continue to monitor.     Diarrhea  -  C. difficile was negative, appears to be secondary to dialysis.  Continue supportive care.     Type 2 diabetes mellitus  -Glucose levels remains stable with levemir and SSI.      Bilateral Lower Extremity Edema  - better  control with elevation and compression     Acute blood loss anemia secondary to esophageal ulcers  -Cont iron supplements.  CBC being monitored by nephrology regularly.      GERD  - Continue PPI.     Diabetic gastroparesis  - Well controlled with Reglan.     Essential hypertension  - Stable controlled with current medication regimen.     Atrial fibrillation  - Stable control of rhythm.  Continue current cardiac medications.  - Continue Eliquis.     Anxiety  - Stable on Lexapro and Seroquel.     Hypothyroidism  - TSH is at goal.  Continue current dose of Synthroid.    [x]  Discussed Patient in detail with nursing/staff, addressed all needs today.     [x]  Plan of Care Reviewed   []  PT/OT Reviewed   [x]  Order Changes  []  Discharge Plans Reviewed  [x]  Advance Directive on file with Nursing Home.   [x]  POA on file with Nursing Home.    [x]  Code Status listed and reviewed.     I confirm accuracy of unchanged data/findings including physical exam and plan which have been carried forward from previous visit, as well as I have updated appropriately those that have changed        Rafiq Baron DO.  10/2/2022

## 2022-10-11 ENCOUNTER — HOSPITAL ENCOUNTER (OUTPATIENT)
Dept: CARDIOLOGY | Facility: HOSPITAL | Age: 62
End: 2022-10-11

## 2022-10-11 ENCOUNTER — HOSPITAL ENCOUNTER (OUTPATIENT)
Dept: CARDIOLOGY | Facility: HOSPITAL | Age: 62
Discharge: HOME | End: 2022-10-11
Admitting: INTERNAL MEDICINE

## 2022-10-11 VITALS
HEART RATE: 105 BPM | SYSTOLIC BLOOD PRESSURE: 148 MMHG | TEMPERATURE: 97.7 F | OXYGEN SATURATION: 97 % | DIASTOLIC BLOOD PRESSURE: 86 MMHG | RESPIRATION RATE: 14 BRPM

## 2022-10-11 DIAGNOSIS — N18.6 END STAGE RENAL DISEASE: ICD-10-CM

## 2022-10-11 NOTE — SIGNIFICANT NOTE
HD line removed by Dr Leone. Pt states no complaints at this time. Manual pressure held for 5 minutes by Anand BERGMAN until hemostasis achieved. Steri strips and dry sterile dressing applied. Pressure dressing placed over top of bandage. Pt instructed to leave pressure dressing in place until HD tomorrow AM. No s/s bleeding currently noted. Will monitor this patient for any s/s of bleeding until 11:30. Pt instructed to return to the ER if site begins to bleed.

## 2022-10-11 NOTE — SIGNIFICANT NOTE
Site assessed for any s/s of bleeding. None noted. Pt states no complaints at this time. Discharge instructions given to patient as well as tech who accompanied her. Stated if any bleeding noted come back to the ER and to leave pressure dressing on until HD tomorrow am.

## 2022-10-11 NOTE — H&P
[facility] Services  HISTORY AND PHYSICAL    Primary Care Physician: Marianela Albright APRN    Subjective     Chief Complaint:  Has a functioning fistula needs tunneled dialysis catheter removal.    History of Present Illness:   Patient with long-standing history of diabetes and hypertension who had a clotted access get a tunneled catheter placed. He has new access placed which is working fine has been used for about 2 weeks.  At this point it has been decided to remove the tunneled dialysis catheter that was placed temporarily.  The patient denies having any fevers chills chest pain shortness of breath no nausea vomiting.  Patient was recently seen during dialysis and has been doing fairly well.      Review of Systems   Otherwise complete ROS performed and negative except as mentioned in the HPI.    Past Medical History:   Past Medical History:   Diagnosis Date   • A-fib (HCC)    • Anemia 10/02/2018   • Diabetes mellitus (HCC)    • Disease of thyroid gland    • GERD (gastroesophageal reflux disease)    • Gout    • History of transfusion    • Hypertension    • Impaired functional mobility, balance, gait, and endurance        Past Surgical History:  Past Surgical History:   Procedure Laterality Date   • ENDOSCOPY N/A 5/2/2022    Procedure: ESOPHAGOGASTRODUODENOSCOPY WITH BIOPSY;  Surgeon: Jacob Chance MD;  Location: [facility] ENDOSCOPY;  Service: Gastroenterology;  Laterality: N/A;   • EYE SURGERY     • INCISION AND DRAINAGE LEG Right 1/14/2019    Procedure: Right heel incision and drainage with graft application;  Surgeon: Ar Rueda DPM;  Location: [facility] OR;  Service: Podiatry   • INSERTION HEMODIALYSIS CATHETER N/A 4/5/2022    Procedure: HEMODIALYSIS CATHETER INSERTION;  Surgeon: Jean Carlos Guadalupe MD;  Location: [facility] OR;  Service: General;  Laterality: N/A;   • LEG SURGERY         Family History: family history includes Asthma in her mother; Hypertension in her father;  Stroke in her father.    Social History:  reports that she has never smoked. She has never used smokeless tobacco. She reports that she does not drink alcohol and does not use drugs.    Medications:  Current Outpatient Medications on File Prior to Encounter   Medication Sig Dispense Refill   • apixaban (ELIQUIS) 5 MG tablet tablet Take 1 tablet by mouth Every 12 (Twelve) Hours. Hold for one week at discharge 60 tablet 11   • aspirin 81 MG chewable tablet Chew 1 tablet Daily. Hold for one week at discharge  0   • docusate sodium (COLACE) 100 MG capsule Take 100 mg by mouth 2 (Two) Times a Day.     • ferrous sulfate 324 (65 Fe) MG tablet delayed-release EC tablet Take 324 mg by mouth 2 (Two) Times a Day With Meals.     • metoprolol tartrate (LOPRESSOR) 25 MG tablet Take 0.5 tablets by mouth Every 12 (Twelve) Hours. 60 tablet 0   • midodrine (PROAMATINE) 10 MG tablet Take 1 tablet by mouth 3 (Three) Times a Day Before Meals. 90 tablet 0   • Elastic Bandages & Supports (JOBST OPAQUE KNEE 20-30MMHG XL) misc 1 application Daily. 1 each 1   • escitalopram (LEXAPRO) 10 MG tablet Take 1 tablet by mouth Daily 30 tablet 12   • insulin detemir (LEVEMIR) 100 UNIT/ML injection Inject 10 Units under the skin into the appropriate area as directed Daily 10 mL 0   • levothyroxine (SYNTHROID, LEVOTHROID) 150 MCG tablet Take 1 tablet by mouth Daily 30 tablet 1   • lidocaine (LIDODERM) 5 % Place 1 patch on the skin as directed by provider Daily. Apply patch to left knee daily, on at 0700 am off at 2000     • phosphorus (K PHOS NEUTRAL) 155-852-130 MG tablet Take 1 tablet by mouth 4 (Four) Times a Day. 60 tablet 0   • QUEtiapine (SEROquel) 25 MG tablet Take 1/2 tablet by mouth Every Night 15 tablet 1     No current facility-administered medications on file prior to encounter.     Reviewed by me    Allergies:  Allergies   Allergen Reactions   • Metformin And Related Anaphylaxis     HA, diarrhea, throat swelling   • Metformin GI  Intolerance         Objective     Physical Exam:  Vital Signs: /85 (BP Location: Right arm, Patient Position: Lying)   Pulse 105   Temp 97.7 °F (36.5 °C) (Temporal)   Resp 16   SpO2 94%      General Appearance: alert, oriented x 3, no acute distress,   Skin: warm and dry  HEENT: pupils round and reactive to light, oral mucosa normal,   Neck: supple, no JVD, trachea midline  Lungs: CTA, unlabored breathing effort  Heart: RRR, normal S1 and S2, no S3, no rub  Abdomen: soft, non-tender, no palpable bladder, present bowel sounds to auscultation  Extremities: no edema, cyanosis or clubbing  Neuro: normal speech and mental status          Results Reviewed:              Assessment / Plan     Assessment/Problem List:     End stage renal disease on dialysis (HCC)          Plan:    Tunneled dialysis catheter will be removed, please see the procedure note.  Keep the dressing on for 24 hours.    The stitches will be removed by the dialysis nurse at the dialysis clinic in 1 week.    Follow-up with me during dialysis at the dialysis clinic             Milad Leone MD, GILA 10/11/22 10:47 EDT

## 2022-10-11 NOTE — POST-PROCEDURE NOTE
Validation General Procedure      Patient was recognized and procedure were discussed with the patient.  Under aseptic technique and local anesthesia right side was prepared.   catheter was removed without any difficulty.  no sutures were applied. Steri-Strips and pressure dressing was done.  Patient tolerated the procedure well, there was no complications.    Milad Leone MD, FASN  10/11/22  10:48 EDT

## 2022-11-08 ENCOUNTER — NURSING HOME (OUTPATIENT)
Dept: INTERNAL MEDICINE | Facility: CLINIC | Age: 62
End: 2022-11-08

## 2022-11-08 VITALS
RESPIRATION RATE: 16 BRPM | HEART RATE: 84 BPM | OXYGEN SATURATION: 98 % | BODY MASS INDEX: 37.77 KG/M2 | WEIGHT: 234 LBS | TEMPERATURE: 97.8 F | DIASTOLIC BLOOD PRESSURE: 69 MMHG | SYSTOLIC BLOOD PRESSURE: 121 MMHG

## 2022-11-08 DIAGNOSIS — N18.6 ESRD (END STAGE RENAL DISEASE): ICD-10-CM

## 2022-11-08 DIAGNOSIS — D62 ACUTE BLOOD LOSS ANEMIA: ICD-10-CM

## 2022-11-08 DIAGNOSIS — E03.9 ACQUIRED HYPOTHYROIDISM: ICD-10-CM

## 2022-11-08 DIAGNOSIS — K21.01 GASTROESOPHAGEAL REFLUX DISEASE WITH ESOPHAGITIS AND HEMORRHAGE: Primary | ICD-10-CM

## 2022-11-08 DIAGNOSIS — E11.21 TYPE 2 DIABETES MELLITUS WITH NEPHROPATHY: ICD-10-CM

## 2022-11-08 DIAGNOSIS — I50.32 CHRONIC DIASTOLIC CONGESTIVE HEART FAILURE: ICD-10-CM

## 2022-11-08 DIAGNOSIS — I10 ESSENTIAL HYPERTENSION: ICD-10-CM

## 2022-11-08 DIAGNOSIS — R60.0 BILATERAL EDEMA OF LOWER EXTREMITY: ICD-10-CM

## 2022-11-08 DIAGNOSIS — N93.9 VAGINAL BLEEDING: ICD-10-CM

## 2022-11-08 PROCEDURE — 99308 SBSQ NF CARE LOW MDM 20: CPT | Performed by: INTERNAL MEDICINE

## 2022-11-09 NOTE — PROGRESS NOTES
Nursing Home Progress Note        Rafiq Baron DO   [address] [place] [address] Phone: [phone]  Fax: [phone]     PATIENT NAME: Millicent Mckeon                                                                          YOB: 1960           DATE OF SERVICE: 11/08/2022  FACILITY:  [facility]  ______________________________________________________________________     CHIEF COMPLAINT:  Chronic Medical Management      HISTORY OF PRESENT ILLNESS:   Patient was resting comfortably in her wheelchair doing puzzles.  She was content and seemed to handle her room change fairly well.  She denied any significant complaints or concerns.  She remains compliant with taking her medications.  She has been tolerating dialysis and going to sessions regularly.    PAST MEDICAL & SURGICAL HISTORY:   Past Medical History:   Diagnosis Date   • A-fib (HCC)    • Anemia 10/02/2018   • Diabetes mellitus (HCC)    • Disease of thyroid gland    • GERD (gastroesophageal reflux disease)    • Gout    • History of transfusion    • Hypertension    • Impaired functional mobility, balance, gait, and endurance       Past Surgical History:   Procedure Laterality Date   • ENDOSCOPY N/A 5/2/2022    Procedure: ESOPHAGOGASTRODUODENOSCOPY WITH BIOPSY;  Surgeon: Jacob Chance MD;  Location: [facility] ENDOSCOPY;  Service: Gastroenterology;  Laterality: N/A;   • EYE SURGERY     • INCISION AND DRAINAGE LEG Right 1/14/2019    Procedure: Right heel incision and drainage with graft application;  Surgeon: Ar Rueda DPM;  Location: [facility] OR;  Service: Podiatry   • INSERTION HEMODIALYSIS CATHETER N/A 4/5/2022    Procedure: HEMODIALYSIS CATHETER INSERTION;  Surgeon: Jean Carlos Guadalupe MD;  Location: [facility] OR;  Service: General;  Laterality: N/A;   • LEG SURGERY           MEDICATIONS:  I have reviewed and reconciled the patients medication list in the patients chart at the skilled nursing facility today, 11/08/2022.       ALLERGIES:  Allergies   Allergen Reactions   • Metformin And Related Anaphylaxis     HA, diarrhea, throat swelling   • Metformin GI Intolerance         SOCIAL HISTORY:  Social History     Socioeconomic History   • Marital status: Single   Tobacco Use   • Smoking status: Never   • Smokeless tobacco: Never   Vaping Use   • Vaping Use: Never used   Substance and Sexual Activity   • Alcohol use: No   • Drug use: No   • Sexual activity: Defer       FAMILY HISTORY:  Family History   Problem Relation Age of Onset   • Asthma Mother    • Hypertension Father    • Stroke Father        REVIEW OF SYSTEMS:  Review of Systems   Constitutional: Negative for chills, fatigue and fever.   HENT: Negative for congestion, ear pain, rhinorrhea, sinus pressure and sore throat.    Eyes: Negative for visual disturbance.   Respiratory: Negative for cough, chest tightness, shortness of breath and wheezing.    Cardiovascular: Negative for chest pain, palpitations and leg swelling.   Gastrointestinal: Negative for abdominal pain, blood in stool, constipation, diarrhea, nausea and vomiting.   Endocrine: Negative for polydipsia and polyuria.   Genitourinary: Negative for dysuria and hematuria.   Musculoskeletal: Negative for arthralgias and back pain.   Skin: Negative for rash.   Neurological: Negative for dizziness, light-headedness, numbness and headaches.   Psychiatric/Behavioral: Negative for dysphoric mood and sleep disturbance. The patient is not nervous/anxious.           PHYSICAL EXAMINATION:     VITAL SIGNS:  /69   Pulse 84   Temp 97.8 °F (36.6 °C)   Resp 16   Wt 106 kg (234 lb)   SpO2 98%   BMI 37.77 kg/m²     Physical Exam  Vitals and nursing note reviewed.   Constitutional:       Appearance: Normal appearance. She is well-developed. She is obese.   HENT:      Head: Normocephalic and atraumatic.      Nose: Nose normal.      Mouth/Throat:      Mouth: Mucous membranes are moist.      Pharynx: No oropharyngeal exudate.    Eyes:      General: No scleral icterus.     Conjunctiva/sclera: Conjunctivae normal.      Pupils: Pupils are equal, round, and reactive to light.   Neck:      Thyroid: No thyromegaly.   Cardiovascular:      Rate and Rhythm: Normal rate. Rhythm irregular.      Heart sounds: Normal heart sounds. No murmur heard.    No friction rub. No gallop.   Pulmonary:      Effort: Pulmonary effort is normal. No respiratory distress.      Breath sounds: Normal breath sounds. No wheezing.   Abdominal:      General: Bowel sounds are normal. There is no distension.      Palpations: Abdomen is soft.      Tenderness: There is no abdominal tenderness.   Musculoskeletal:         General: No deformity or signs of injury.      Cervical back: Normal range of motion and neck supple.      Right lower leg: Edema present.      Left lower leg: Edema present.   Lymphadenopathy:      Cervical: No cervical adenopathy.   Skin:     General: Skin is warm and dry.      Findings: No rash.   Neurological:      Mental Status: She is alert and oriented to person, place, and time.   Psychiatric:         Mood and Affect: Mood normal.         Behavior: Behavior normal.         RECORDS REVIEW:       ASSESSMENT     Diagnoses and all orders for this visit:    1. Gastroesophageal reflux disease with esophagitis and hemorrhage (Primary)    2. Essential hypertension    3. Type 2 diabetes mellitus with nephropathy (HCC)    4. Bilateral edema of lower extremity    5. Acquired hypothyroidism    6. ESRD (end stage renal disease) (HCC)    7. Acute blood loss anemia    8. Vaginal bleeding    9. Chronic diastolic congestive heart failure (HCC)        PLAN  End-stage renal disease  - Continue hemodialysis as scheduled 3 times a week.  Tolerating very well.  -CBC, CMP, A1c every 3 months.     Type 2 diabetes mellitus  -Glucose levels remains stable with levemir and SSI.      Vaginal Bleeding  - CT imaging 5/7/22 was suggesting calcified uterine fibroids.  No signs of  recurrence at this time.  We will continue to monitor.     Diarrhea  -  C. difficile was negative, appears to be secondary to dialysis.  Continue supportive care.       Bilateral Lower Extremity Edema  - better control with elevation and compression     Acute blood loss anemia secondary to esophageal ulcers  -Cont iron supplements.  CBC being monitored by nephrology regularly.      GERD  - Continue PPI.     Diabetic gastroparesis  - Well controlled with Reglan.     Essential hypertension  - Stable controlled with current medication regimen.     Atrial fibrillation  - Stable control of rhythm.  Continue current cardiac medications.  - Continue Eliquis.     Anxiety  - Stable on Lexapro and Seroquel.     Hypothyroidism  - TSH is at goal.  Continue current dose of Synthroid.        [x]  Discussed Patient in detail with nursing/staff, addressed all needs today.     [x]  Plan of Care Reviewed   []  PT/OT Reviewed   []  Order Changes  []  Discharge Plans Reviewed  [x]  Advance Directive on file with Nursing Home.   [x]  POA on file with Nursing Home.    [x]  Code Status listed and reviewed.     I confirm accuracy of unchanged data/findings including physical exam and plan which have been carried forward from previous visit, as well as I have updated appropriately those that have changed        Rafiq Baron DO.  11/8/2022

## 2022-11-19 ENCOUNTER — NURSING HOME (OUTPATIENT)
Dept: FAMILY MEDICINE CLINIC | Facility: CLINIC | Age: 62
End: 2022-11-19
Payer: MEDICARE

## 2022-11-19 DIAGNOSIS — E11.65 UNCONTROLLED TYPE 2 DIABETES MELLITUS WITH HYPERGLYCEMIA: Primary | ICD-10-CM

## 2022-11-19 DIAGNOSIS — R46.89 NON-COMPLIANT BEHAVIOR: ICD-10-CM

## 2022-11-19 PROCEDURE — 99309 SBSQ NF CARE MODERATE MDM 30: CPT | Performed by: NURSE PRACTITIONER

## 2022-11-19 NOTE — LETTER
Nursing Home Follow Up Note      Praful Archuleta DO []   SNEHA Salmon []    SNEHA Avila [x]   [address] [place] [address]  Phone: [phone]  Fax: [phone] Beto Aguayo MD []    Rafiq Baron DO []   [address] [place] [address]  Phone: [phone]  Fax: [phone]     PATIENT NAME: Millicent Mckeon                                                                          YOB: 1960           DATE OF SERVICE: 11/19/2022  FACILITY:  [x][facility]   [] House   [] [facility]   [] [facility]   [] Other ______________________________________________________________________      CHIEF COMPLAINT:  Uncontrolled DM      HISTORY OF PRESENT ILLNESS:   Millicent Mckeon is a 62 y.o. female who is being seen today following recent lab results. Patient has a history of Type 2 DM and most recent A1c has increased. Previous A1c 7.7 on 8/18/22, most recent 8.3 on 11/17/22. Staff reports that patient is not compliant with low carb diet and family brings her snacks.     PAST MEDICAL & SURGICAL HISTORY:   Past Medical History:   Diagnosis Date   • A-fib (HCC)    • Anemia 10/02/2018   • Diabetes mellitus (HCC)    • Disease of thyroid gland    • GERD (gastroesophageal reflux disease)    • Gout    • History of transfusion    • Hypertension    • Impaired functional mobility, balance, gait, and endurance       Past Surgical History:   Procedure Laterality Date   • ENDOSCOPY N/A 5/2/2022    Procedure: ESOPHAGOGASTRODUODENOSCOPY WITH BIOPSY;  Surgeon: Jacob Chance MD;  Location: [facility] ENDOSCOPY;  Service: Gastroenterology;  Laterality: N/A;   • EYE SURGERY     • INCISION AND DRAINAGE LEG Right 1/14/2019    Procedure: Right heel incision and drainage with graft application;  Surgeon: Ar Rueda DPM;  Location: [facility] OR;  Service: Podiatry   • INSERTION HEMODIALYSIS CATHETER N/A 4/5/2022    Procedure: HEMODIALYSIS CATHETER INSERTION;  Surgeon: Collins  MD Jean Carlos;  Location: [facility];  Service: General;  Laterality: N/A;   • LEG SURGERY           MEDICATIONS:  I have reviewed and reconciled the patients medication list in the patients chart at the skilled nursing facility today.      ALLERGIES:    Allergies   Allergen Reactions   • Metformin And Related Anaphylaxis     HA, diarrhea, throat swelling   • Metformin GI Intolerance         SOCIAL HISTORY:    Social History     Socioeconomic History   • Marital status: Single   Tobacco Use   • Smoking status: Never   • Smokeless tobacco: Never   Vaping Use   • Vaping Use: Never used   Substance and Sexual Activity   • Alcohol use: No   • Drug use: No   • Sexual activity: Defer       FAMILY HISTORY:    Family History   Problem Relation Age of Onset   • Asthma Mother    • Hypertension Father    • Stroke Father        REVIEW OF SYSTEMS:    Review of Systems   Constitutional: Negative for fatigue and fever.   Gastrointestinal: Negative for constipation, diarrhea, nausea and vomiting.   Psychiatric/Behavioral: Negative for sleep disturbance and stress.   All other systems reviewed and are negative.        PHYSICAL EXAMINATION:   VITAL SIGNS:   Vitals:    11/19/22 1004   BP: 122/80   Pulse: 76   Resp: 18   Temp: 98 °F (36.7 °C)   SpO2: 97%       Nursing notes and vital signs reviewed.   General Appearance:  Obese female lying in bed, NAD.    Head: Normocephalic and atraumatic, without obvious abnormality     Eyes: PERRLA.  Conjunctivae and sclerae normal.    Neck: Normal range of motion. Neck supple. No JVD present.   Cardiovascular: Normal rate, regular rhythm.  Pulses palpable equal bilaterally.    Pulmonary/Chest: Effort normal and breath sounds normal. No respiratory distress.   Abdominal: Soft. Bowel sounds are normal. No distention or tenderness.     Musculoskeletal: Without obvious deformity.    Neurological: Sleeping awakens briefly.  Appears at baseline cognition.    Skin: Skin is warm and dry.   Psychiatric:   Normal mood and affect. Normal behavior        RECORDS REVIEW:   8/18--A1c 7.7  11/17--A1c 8.3    ASSESSMENT     Diagnoses and all orders for this visit:    1. Uncontrolled type 2 diabetes mellitus with hyperglycemia (HCC) (Primary)    2. Non-compliant behavior        PLAN  --increase Levemir to 15units BID at 0630 and 1830  --discourage snacking on high-carb foods  --encourage patient to follow low-carb diet  --continue to monitor VS per facility policy  --continue to monitor BG ACHS and as needed   --notify MD or myself of any acute changes in condition    [x]  Discussed Patient in detail with nursing/staff, addressed all needs today.     [x]  Plan of Care Reviewed   []  PT/OT Reviewed   []  Order Changes  []  Discharge Plans Reviewed  [x]  Advance Directive on file with Nursing Home.   [x]  POA on file with Nursing Home.   [x]  Code Status listed: []  Full Code   []  DNR       Dorothy Dc, APRN  11/19/2022

## 2022-11-21 VITALS
HEART RATE: 76 BPM | WEIGHT: 230.1 LBS | SYSTOLIC BLOOD PRESSURE: 122 MMHG | BODY MASS INDEX: 37.14 KG/M2 | DIASTOLIC BLOOD PRESSURE: 80 MMHG | OXYGEN SATURATION: 97 % | TEMPERATURE: 98 F | RESPIRATION RATE: 18 BRPM

## 2022-11-29 ENCOUNTER — TELEPHONE (OUTPATIENT)
Dept: CARDIOLOGY | Facility: CLINIC | Age: 62
End: 2022-11-29

## 2023-01-10 ENCOUNTER — NURSING HOME (OUTPATIENT)
Dept: INTERNAL MEDICINE | Facility: CLINIC | Age: 63
End: 2023-01-10
Payer: MEDICARE

## 2023-01-10 VITALS
DIASTOLIC BLOOD PRESSURE: 78 MMHG | BODY MASS INDEX: 37.87 KG/M2 | WEIGHT: 234.6 LBS | HEART RATE: 104 BPM | SYSTOLIC BLOOD PRESSURE: 134 MMHG | TEMPERATURE: 97.7 F | OXYGEN SATURATION: 96 % | RESPIRATION RATE: 17 BRPM

## 2023-01-10 DIAGNOSIS — E03.9 ACQUIRED HYPOTHYROIDISM: ICD-10-CM

## 2023-01-10 DIAGNOSIS — K21.01 GASTROESOPHAGEAL REFLUX DISEASE WITH ESOPHAGITIS AND HEMORRHAGE: Primary | ICD-10-CM

## 2023-01-10 DIAGNOSIS — R60.0 BILATERAL EDEMA OF LOWER EXTREMITY: ICD-10-CM

## 2023-01-10 DIAGNOSIS — E11.21 TYPE 2 DIABETES MELLITUS WITH NEPHROPATHY: ICD-10-CM

## 2023-01-10 DIAGNOSIS — I10 ESSENTIAL HYPERTENSION: ICD-10-CM

## 2023-01-10 DIAGNOSIS — N18.6 ESRD (END STAGE RENAL DISEASE): ICD-10-CM

## 2023-01-10 PROCEDURE — 99309 SBSQ NF CARE MODERATE MDM 30: CPT | Performed by: INTERNAL MEDICINE

## 2023-01-10 NOTE — LETTER
Nursing Home Progress Note        Rafiq Baron DO   [address] [place] [address] Phone: [phone]  Fax: [phone]     PATIENT NAME: Millicent Mckeon                                                                          YOB: 1960           DATE OF SERVICE: 01/10/2023  FACILITY:  [facility]  ______________________________________________________________________     CHIEF COMPLAINT:  Chronic Medical Management/diabetes      HISTORY OF PRESENT ILLNESS:   Patient was seen for routine follow-up of medical conditions.  Left upper extremity dialysis arm has developed a large hematoma however the fistula remains functional and dialysis has not been interrupted.  Patient denies any significant pain.  Her glucose levels have been running on the higher side upon review of her labs.  A.m. glucose is typically in the 150s but due to poor diet glucose.  Quickly goes to the 200-300s throughout the day.  Nurses note that patient does not control her diet or sugar intake.    PAST MEDICAL & SURGICAL HISTORY:   Past Medical History:   Diagnosis Date   • A-fib (HCC)    • Anemia 10/02/2018   • Diabetes mellitus (HCC)    • Disease of thyroid gland    • GERD (gastroesophageal reflux disease)    • Gout    • History of transfusion    • Hypertension    • Impaired functional mobility, balance, gait, and endurance       Past Surgical History:   Procedure Laterality Date   • ENDOSCOPY N/A 5/2/2022    Procedure: ESOPHAGOGASTRODUODENOSCOPY WITH BIOPSY;  Surgeon: Jacob Chance MD;  Location: [facility] ENDOSCOPY;  Service: Gastroenterology;  Laterality: N/A;   • EYE SURGERY     • INCISION AND DRAINAGE LEG Right 1/14/2019    Procedure: Right heel incision and drainage with graft application;  Surgeon: Ar Rueda DPM;  Location: [facility] OR;  Service: Podiatry   • INSERTION HEMODIALYSIS CATHETER N/A 4/5/2022    Procedure: HEMODIALYSIS CATHETER INSERTION;  Surgeon: Jean Carlos Guadalupe MD;  Location:   [facility] OR;  Service: General;  Laterality: N/A;   • LEG SURGERY           MEDICATIONS:  I have reviewed and reconciled the patients medication list in the patients chart at the skilled nursing facility today, 01/10/2023.      ALLERGIES:  Allergies   Allergen Reactions   • Metformin And Related Anaphylaxis     HA, diarrhea, throat swelling   • Metformin GI Intolerance         SOCIAL HISTORY:  Social History     Socioeconomic History   • Marital status: Single   Tobacco Use   • Smoking status: Never   • Smokeless tobacco: Never   Vaping Use   • Vaping Use: Never used   Substance and Sexual Activity   • Alcohol use: No   • Drug use: No   • Sexual activity: Defer       FAMILY HISTORY:  Family History   Problem Relation Age of Onset   • Asthma Mother    • Hypertension Father    • Stroke Father        REVIEW OF SYSTEMS:  Review of Systems   Constitutional: Negative for chills, fatigue and fever.   HENT: Negative for congestion, ear pain, rhinorrhea, sinus pressure and sore throat.    Eyes: Negative for visual disturbance.   Respiratory: Negative for cough, chest tightness, shortness of breath and wheezing.    Cardiovascular: Negative for chest pain, palpitations and leg swelling.   Gastrointestinal: Negative for abdominal pain, blood in stool, constipation, diarrhea, nausea and vomiting.   Endocrine: Negative for polydipsia and polyuria.   Genitourinary: Negative for dysuria and hematuria.   Musculoskeletal: Negative for arthralgias and back pain.   Skin: Positive for color change (LUE). Negative for rash.   Neurological: Negative for dizziness, light-headedness, numbness and headaches.   Psychiatric/Behavioral: Negative for dysphoric mood and sleep disturbance. The patient is not nervous/anxious.           PHYSICAL EXAMINATION:     VITAL SIGNS:  /78   Pulse 104   Temp 97.7 °F (36.5 °C)   Resp 17   Wt 106 kg (234 lb 9.6 oz)   SpO2 96%   BMI 37.87 kg/m²     Physical Exam  Vitals and nursing note reviewed.    Constitutional:       Appearance: Normal appearance. She is well-developed. She is obese.   HENT:      Head: Normocephalic and atraumatic.      Nose: Nose normal.      Mouth/Throat:      Mouth: Mucous membranes are moist.      Pharynx: No oropharyngeal exudate.   Eyes:      General: No scleral icterus.     Conjunctiva/sclera: Conjunctivae normal.      Pupils: Pupils are equal, round, and reactive to light.   Neck:      Thyroid: No thyromegaly.   Cardiovascular:      Rate and Rhythm: Normal rate. Rhythm irregular.      Heart sounds: Normal heart sounds. No murmur heard.    No friction rub. No gallop.   Pulmonary:      Effort: Pulmonary effort is normal. No respiratory distress.      Breath sounds: Normal breath sounds. No wheezing.   Abdominal:      General: Bowel sounds are normal. There is no distension.      Palpations: Abdomen is soft.      Tenderness: There is no abdominal tenderness.   Musculoskeletal:         General: No deformity or signs of injury.      Cervical back: Normal range of motion and neck supple.      Right lower leg: Edema present.      Left lower leg: Edema present.      Comments: Left upper extremity AV fistula well-functioning however large hematoma is noted proximal to this site.   Lymphadenopathy:      Cervical: No cervical adenopathy.   Skin:     General: Skin is warm and dry.      Findings: No rash.   Neurological:      Mental Status: She is alert and oriented to person, place, and time.   Psychiatric:         Mood and Affect: Mood normal.         Behavior: Behavior normal.         RECORDS REVIEW:   Labs: 11/17/2022 glucose 213, BUN 33, creatinine 3.3, RBC 3.73, hemoglobin 11.0, A1c 8.3    ASSESSMENT     Diagnoses and all orders for this visit:    1. Gastroesophageal reflux disease with esophagitis and hemorrhage (Primary)    2. Essential hypertension    3. Type 2 diabetes mellitus with nephropathy (HCC)    4. ESRD (end stage renal disease) (HCC)    5. Acquired hypothyroidism    6.  Bilateral edema of lower extremity        PLAN  Left upper extremity AV fistula hematoma  - Site is still functional.  Continue dialysis per nephrology.    Type 2 diabetes mellitus with hyperglycemia  - Glucose levels tend to run high at dinnertime/evening.  Start NovoLog 2 units with dinner continue mealtime sliding scale 3 times a day  - Continue Levemir 15 units twice daily.    End-stage renal disease  - Continue hemodialysis as scheduled 3 times a week.  -CBC, CMP, A1c every 3 months.     Type 2 diabetes mellitus  -Glucose levels remains stable with levemir and SSI.      Vaginal Bleeding  - CT imaging 5/7/22 was suggesting calcified uterine fibroids.  No signs of recurrence at this time.  We will continue to monitor.     Diarrhea  -  C. difficile was negative, appears to be secondary to dialysis.  Continue supportive care.       Bilateral Lower Extremity Edema  - better control with elevation and compression     Acute blood loss anemia secondary to esophageal ulcers  -Cont iron supplements.  CBC being monitored by nephrology regularly.      GERD  - Continue PPI.     Diabetic gastroparesis  - Well controlled with Reglan.     Essential hypertension  - Stable controlled with current medication regimen.     Atrial fibrillation  - Stable control of rhythm.  Continue current cardiac medications.  - Continue Eliquis.     Anxiety  - Stable on Lexapro and Seroquel.     Hypothyroidism  - TSH is at goal.  Continue current dose of Synthroid.        [x]  Discussed Patient in detail with nursing/staff, addressed all needs today.     [x]  Plan of Care Reviewed   []  PT/OT Reviewed   []  Order Changes  []  Discharge Plans Reviewed  [x]  Advance Directive on file with Nursing Home.   [x]  POA on file with Nursing Home.    [x]  Code Status listed and reviewed.     I confirm accuracy of unchanged data/findings including physical exam and plan which have been carried forward from previous visit, as well as I have updated  appropriately those that have changed        Rafiq Baron DO.  1/16/2023

## 2023-01-16 NOTE — PROGRESS NOTES
Nursing Home Progress Note        Rafiq Baron DO   [address] [place] [address] Phone: [phone]  Fax: [phone]     PATIENT NAME: Millicent Mckeon                                                                          YOB: 1960           DATE OF SERVICE: 01/10/2023  FACILITY:  [facility]  ______________________________________________________________________     CHIEF COMPLAINT:  Chronic Medical Management/diabetes      HISTORY OF PRESENT ILLNESS:   Patient was seen for routine follow-up of medical conditions.  Left upper extremity dialysis arm has developed a large hematoma however the fistula remains functional and dialysis has not been interrupted.  Patient denies any significant pain.  Her glucose levels have been running on the higher side upon review of her labs.  A.m. glucose is typically in the 150s but due to poor diet glucose.  Quickly goes to the 200-300s throughout the day.  Nurses note that patient does not control her diet or sugar intake.    PAST MEDICAL & SURGICAL HISTORY:   Past Medical History:   Diagnosis Date   • A-fib (HCC)    • Anemia 10/02/2018   • Diabetes mellitus (HCC)    • Disease of thyroid gland    • GERD (gastroesophageal reflux disease)    • Gout    • History of transfusion    • Hypertension    • Impaired functional mobility, balance, gait, and endurance       Past Surgical History:   Procedure Laterality Date   • ENDOSCOPY N/A 5/2/2022    Procedure: ESOPHAGOGASTRODUODENOSCOPY WITH BIOPSY;  Surgeon: Jacob Chance MD;  Location: [facility] ENDOSCOPY;  Service: Gastroenterology;  Laterality: N/A;   • EYE SURGERY     • INCISION AND DRAINAGE LEG Right 1/14/2019    Procedure: Right heel incision and drainage with graft application;  Surgeon: Ar Rueda DPM;  Location: [facility] OR;  Service: Podiatry   • INSERTION HEMODIALYSIS CATHETER N/A 4/5/2022    Procedure: HEMODIALYSIS CATHETER INSERTION;  Surgeon: Jean Carlos Guadalupe MD;  Location:   [facility] OR;  Service: General;  Laterality: N/A;   • LEG SURGERY           MEDICATIONS:  I have reviewed and reconciled the patients medication list in the patients chart at the skilled nursing facility today, 01/10/2023.      ALLERGIES:  Allergies   Allergen Reactions   • Metformin And Related Anaphylaxis     HA, diarrhea, throat swelling   • Metformin GI Intolerance         SOCIAL HISTORY:  Social History     Socioeconomic History   • Marital status: Single   Tobacco Use   • Smoking status: Never   • Smokeless tobacco: Never   Vaping Use   • Vaping Use: Never used   Substance and Sexual Activity   • Alcohol use: No   • Drug use: No   • Sexual activity: Defer       FAMILY HISTORY:  Family History   Problem Relation Age of Onset   • Asthma Mother    • Hypertension Father    • Stroke Father        REVIEW OF SYSTEMS:  Review of Systems   Constitutional: Negative for chills, fatigue and fever.   HENT: Negative for congestion, ear pain, rhinorrhea, sinus pressure and sore throat.    Eyes: Negative for visual disturbance.   Respiratory: Negative for cough, chest tightness, shortness of breath and wheezing.    Cardiovascular: Negative for chest pain, palpitations and leg swelling.   Gastrointestinal: Negative for abdominal pain, blood in stool, constipation, diarrhea, nausea and vomiting.   Endocrine: Negative for polydipsia and polyuria.   Genitourinary: Negative for dysuria and hematuria.   Musculoskeletal: Negative for arthralgias and back pain.   Skin: Positive for color change (LUE). Negative for rash.   Neurological: Negative for dizziness, light-headedness, numbness and headaches.   Psychiatric/Behavioral: Negative for dysphoric mood and sleep disturbance. The patient is not nervous/anxious.           PHYSICAL EXAMINATION:     VITAL SIGNS:  /78   Pulse 104   Temp 97.7 °F (36.5 °C)   Resp 17   Wt 106 kg (234 lb 9.6 oz)   SpO2 96%   BMI 37.87 kg/m²     Physical Exam  Vitals and nursing note reviewed.    Constitutional:       Appearance: Normal appearance. She is well-developed. She is obese.   HENT:      Head: Normocephalic and atraumatic.      Nose: Nose normal.      Mouth/Throat:      Mouth: Mucous membranes are moist.      Pharynx: No oropharyngeal exudate.   Eyes:      General: No scleral icterus.     Conjunctiva/sclera: Conjunctivae normal.      Pupils: Pupils are equal, round, and reactive to light.   Neck:      Thyroid: No thyromegaly.   Cardiovascular:      Rate and Rhythm: Normal rate. Rhythm irregular.      Heart sounds: Normal heart sounds. No murmur heard.    No friction rub. No gallop.   Pulmonary:      Effort: Pulmonary effort is normal. No respiratory distress.      Breath sounds: Normal breath sounds. No wheezing.   Abdominal:      General: Bowel sounds are normal. There is no distension.      Palpations: Abdomen is soft.      Tenderness: There is no abdominal tenderness.   Musculoskeletal:         General: No deformity or signs of injury.      Cervical back: Normal range of motion and neck supple.      Right lower leg: Edema present.      Left lower leg: Edema present.      Comments: Left upper extremity AV fistula well-functioning however large hematoma is noted proximal to this site.   Lymphadenopathy:      Cervical: No cervical adenopathy.   Skin:     General: Skin is warm and dry.      Findings: No rash.   Neurological:      Mental Status: She is alert and oriented to person, place, and time.   Psychiatric:         Mood and Affect: Mood normal.         Behavior: Behavior normal.         RECORDS REVIEW:   Labs: 11/17/2022 glucose 213, BUN 33, creatinine 3.3, RBC 3.73, hemoglobin 11.0, A1c 8.3    ASSESSMENT     Diagnoses and all orders for this visit:    1. Gastroesophageal reflux disease with esophagitis and hemorrhage (Primary)    2. Essential hypertension    3. Type 2 diabetes mellitus with nephropathy (HCC)    4. ESRD (end stage renal disease) (HCC)    5. Acquired hypothyroidism    6.  Bilateral edema of lower extremity        PLAN  Left upper extremity AV fistula hematoma  - Site is still functional.  Continue dialysis per nephrology.    Type 2 diabetes mellitus with hyperglycemia  - Glucose levels tend to run high at dinnertime/evening.  Start NovoLog 2 units with dinner continue mealtime sliding scale 3 times a day  - Continue Levemir 15 units twice daily.    End-stage renal disease  - Continue hemodialysis as scheduled 3 times a week.  -CBC, CMP, A1c every 3 months.     Type 2 diabetes mellitus  -Glucose levels remains stable with levemir and SSI.      Vaginal Bleeding  - CT imaging 5/7/22 was suggesting calcified uterine fibroids.  No signs of recurrence at this time.  We will continue to monitor.     Diarrhea  -  C. difficile was negative, appears to be secondary to dialysis.  Continue supportive care.       Bilateral Lower Extremity Edema  - better control with elevation and compression     Acute blood loss anemia secondary to esophageal ulcers  -Cont iron supplements.  CBC being monitored by nephrology regularly.      GERD  - Continue PPI.     Diabetic gastroparesis  - Well controlled with Reglan.     Essential hypertension  - Stable controlled with current medication regimen.     Atrial fibrillation  - Stable control of rhythm.  Continue current cardiac medications.  - Continue Eliquis.     Anxiety  - Stable on Lexapro and Seroquel.     Hypothyroidism  - TSH is at goal.  Continue current dose of Synthroid.        [x]  Discussed Patient in detail with nursing/staff, addressed all needs today.     [x]  Plan of Care Reviewed   []  PT/OT Reviewed   []  Order Changes  []  Discharge Plans Reviewed  [x]  Advance Directive on file with Nursing Home.   [x]  POA on file with Nursing Home.    [x]  Code Status listed and reviewed.     I confirm accuracy of unchanged data/findings including physical exam and plan which have been carried forward from previous visit, as well as I have updated  appropriately those that have changed        Rafiq Baron DO.  1/16/2023

## 2023-01-26 NOTE — PROGRESS NOTES
Nursing Home Follow Up Note      Praful Archuleta DO []   SNEHA Salmon []    SNEHA Avila [x]   [address] [place] [address]  Phone: [phone]  Fax: [phone] Beto Aguayo MD []    Rafiq Baron DO []   [address] [place] [address]  Phone: [phone]  Fax: [phone]     PATIENT NAME: Millicent Mckeon                                                                          YOB: 1960           DATE OF SERVICE: 11/19/2022  FACILITY:  [x][facility]   [] [facility]   [] [facility]   [] [facility]   [] Other ______________________________________________________________________      CHIEF COMPLAINT:  Uncontrolled DM      HISTORY OF PRESENT ILLNESS:   Millicent Mckeon is a 62 y.o. female who is being seen today following recent lab results. Patient has a history of Type 2 DM and most recent A1c has increased. Previous A1c 7.7 on 8/18/22, most recent 8.3 on 11/17/22. Staff reports that patient is not compliant with low carb diet and family brings her snacks.     PAST MEDICAL & SURGICAL HISTORY:   Past Medical History:   Diagnosis Date   • A-fib (HCC)    • Anemia 10/02/2018   • Diabetes mellitus (HCC)    • Disease of thyroid gland    • GERD (gastroesophageal reflux disease)    • Gout    • History of transfusion    • Hypertension    • Impaired functional mobility, balance, gait, and endurance       Past Surgical History:   Procedure Laterality Date   • ENDOSCOPY N/A 5/2/2022    Procedure: ESOPHAGOGASTRODUODENOSCOPY WITH BIOPSY;  Surgeon: Jacob Chance MD;  Location: [facility] ENDOSCOPY;  Service: Gastroenterology;  Laterality: N/A;   • EYE SURGERY     • INCISION AND DRAINAGE LEG Right 1/14/2019    Procedure: Right heel incision and drainage with graft application;  Surgeon: Ar Rueda DPM;  Location: [facility] OR;  Service: Podiatry   • INSERTION HEMODIALYSIS CATHETER N/A 4/5/2022    Procedure: HEMODIALYSIS CATHETER INSERTION;  Surgeon: Collins  MD Jean Carlos;  Location: [facility];  Service: General;  Laterality: N/A;   • LEG SURGERY           MEDICATIONS:  I have reviewed and reconciled the patients medication list in the patients chart at the skilled nursing facility today.      ALLERGIES:    Allergies   Allergen Reactions   • Metformin And Related Anaphylaxis     HA, diarrhea, throat swelling   • Metformin GI Intolerance         SOCIAL HISTORY:    Social History     Socioeconomic History   • Marital status: Single   Tobacco Use   • Smoking status: Never   • Smokeless tobacco: Never   Vaping Use   • Vaping Use: Never used   Substance and Sexual Activity   • Alcohol use: No   • Drug use: No   • Sexual activity: Defer       FAMILY HISTORY:    Family History   Problem Relation Age of Onset   • Asthma Mother    • Hypertension Father    • Stroke Father        REVIEW OF SYSTEMS:    Review of Systems   Constitutional: Negative for fatigue and fever.   Gastrointestinal: Negative for constipation, diarrhea, nausea and vomiting.   Psychiatric/Behavioral: Negative for sleep disturbance and stress.   All other systems reviewed and are negative.        PHYSICAL EXAMINATION:   VITAL SIGNS:   Vitals:    11/19/22 1004   BP: 122/80   Pulse: 76   Resp: 18   Temp: 98 °F (36.7 °C)   SpO2: 97%       Nursing notes and vital signs reviewed.   General Appearance:  Obese female lying in bed, NAD.    Head: Normocephalic and atraumatic, without obvious abnormality     Eyes: PERRLA.  Conjunctivae and sclerae normal.    Neck: Normal range of motion. Neck supple. No JVD present.   Cardiovascular: Normal rate, regular rhythm.  Pulses palpable equal bilaterally.    Pulmonary/Chest: Effort normal and breath sounds normal. No respiratory distress.   Abdominal: Soft. Bowel sounds are normal. No distention or tenderness.     Musculoskeletal: Without obvious deformity.    Neurological: Sleeping awakens briefly.  Appears at baseline cognition.    Skin: Skin is warm and dry.   Psychiatric:   Normal mood and affect. Normal behavior        RECORDS REVIEW:   8/18--A1c 7.7  11/17--A1c 8.3    ASSESSMENT     Diagnoses and all orders for this visit:    1. Uncontrolled type 2 diabetes mellitus with hyperglycemia (HCC) (Primary)    2. Non-compliant behavior        PLAN  --increase Levemir to 15units BID at 0630 and 1830  --discourage snacking on high-carb foods  --encourage patient to follow low-carb diet  --continue to monitor VS per facility policy  --continue to monitor BG ACHS and as needed   --notify MD or myself of any acute changes in condition    [x]  Discussed Patient in detail with nursing/staff, addressed all needs today.     [x]  Plan of Care Reviewed   []  PT/OT Reviewed   []  Order Changes  []  Discharge Plans Reviewed  [x]  Advance Directive on file with Nursing Home.   [x]  POA on file with Nursing Home.   [x]  Code Status listed: []  Full Code   []  DNR       Dorothy Dc, APRN  11/19/2022

## 2023-03-14 ENCOUNTER — NURSING HOME (OUTPATIENT)
Dept: INTERNAL MEDICINE | Facility: CLINIC | Age: 63
End: 2023-03-14
Payer: MEDICARE

## 2023-03-14 VITALS
DIASTOLIC BLOOD PRESSURE: 66 MMHG | SYSTOLIC BLOOD PRESSURE: 142 MMHG | RESPIRATION RATE: 16 BRPM | OXYGEN SATURATION: 94 % | HEART RATE: 109 BPM | TEMPERATURE: 97.3 F | WEIGHT: 240.2 LBS | BODY MASS INDEX: 38.77 KG/M2

## 2023-03-14 DIAGNOSIS — E11.21 TYPE 2 DIABETES MELLITUS WITH NEPHROPATHY: Primary | ICD-10-CM

## 2023-03-14 DIAGNOSIS — R60.0 BILATERAL EDEMA OF LOWER EXTREMITY: ICD-10-CM

## 2023-03-14 DIAGNOSIS — E03.9 ACQUIRED HYPOTHYROIDISM: ICD-10-CM

## 2023-03-14 DIAGNOSIS — N18.6 ESRD (END STAGE RENAL DISEASE): ICD-10-CM

## 2023-03-14 DIAGNOSIS — K21.01 GASTROESOPHAGEAL REFLUX DISEASE WITH ESOPHAGITIS AND HEMORRHAGE: ICD-10-CM

## 2023-03-14 DIAGNOSIS — I10 ESSENTIAL HYPERTENSION: ICD-10-CM

## 2023-03-14 PROCEDURE — 99309 SBSQ NF CARE MODERATE MDM 30: CPT | Performed by: INTERNAL MEDICINE

## 2023-03-20 NOTE — PROGRESS NOTES
Nursing Home Progress Note        Rafiq Baron DO   [address] [place] [address] Phone: [phone]  Fax: [phone]     PATIENT NAME: Millicent Mckeon                                                                          YOB: 1960           DATE OF SERVICE: 03/14/2023  FACILITY:  [facility]  ______________________________________________________________________     CHIEF COMPLAINT:  Chronic Medical Management      HISTORY OF PRESENT ILLNESS:   Patient was seen for routine follow-up.  Glucose levels have been running high primarily between noon and 5 PM with levels between 200-360.  Morning glucose may run around 100-170.  Her fistula hematoma has been improving.  Specific instructions were given by vascular surgeons.  Ensure that certain places within the arm are used for access rather than others.  Note will be passed to dialysis clinic.  Patient was sitting up comfortably in her wheelchair content with her care with no specific complaints otherwise.  She admits to eating candies that her family brings in very frequently.    PAST MEDICAL & SURGICAL HISTORY:   Past Medical History:   Diagnosis Date   • A-fib (HCC)    • Anemia 10/02/2018   • Diabetes mellitus (HCC)    • Disease of thyroid gland    • GERD (gastroesophageal reflux disease)    • Gout    • History of transfusion    • Hypertension    • Impaired functional mobility, balance, gait, and endurance       Past Surgical History:   Procedure Laterality Date   • ENDOSCOPY N/A 5/2/2022    Procedure: ESOPHAGOGASTRODUODENOSCOPY WITH BIOPSY;  Surgeon: Jacob Chance MD;  Location: [facility] ENDOSCOPY;  Service: Gastroenterology;  Laterality: N/A;   • EYE SURGERY     • INCISION AND DRAINAGE LEG Right 1/14/2019    Procedure: Right heel incision and drainage with graft application;  Surgeon: Ar Rueda DPM;  Location: [facility] OR;  Service: Podiatry   • INSERTION HEMODIALYSIS CATHETER N/A 4/5/2022    Procedure: HEMODIALYSIS  CATHETER INSERTION;  Surgeon: Jean Carlos Guadalupe MD;  Location: [facility];  Service: General;  Laterality: N/A;   • LEG SURGERY           MEDICATIONS:  I have reviewed and reconciled the patients medication list in the patients chart at the skilled nursing facility today, 03/14/2023.      ALLERGIES:  Allergies   Allergen Reactions   • Metformin And Related Anaphylaxis     HA, diarrhea, throat swelling   • Metformin GI Intolerance         SOCIAL HISTORY:  Social History     Socioeconomic History   • Marital status: Single   Tobacco Use   • Smoking status: Never   • Smokeless tobacco: Never   Vaping Use   • Vaping Use: Never used   Substance and Sexual Activity   • Alcohol use: No   • Drug use: No   • Sexual activity: Defer       FAMILY HISTORY:  Family History   Problem Relation Age of Onset   • Asthma Mother    • Hypertension Father    • Stroke Father        REVIEW OF SYSTEMS:  Review of Systems   Constitutional: Negative for chills, fatigue and fever.   HENT: Negative for congestion, ear pain, rhinorrhea, sinus pressure and sore throat.    Eyes: Negative for visual disturbance.   Respiratory: Negative for cough, chest tightness, shortness of breath and wheezing.    Cardiovascular: Negative for chest pain, palpitations and leg swelling.   Gastrointestinal: Negative for abdominal pain, blood in stool, constipation, diarrhea, nausea and vomiting.   Endocrine: Negative for polydipsia and polyuria.   Genitourinary: Negative for dysuria and hematuria.   Musculoskeletal: Negative for arthralgias and back pain.   Skin: Negative for rash.   Neurological: Negative for dizziness, light-headedness, numbness and headaches.   Psychiatric/Behavioral: Negative for dysphoric mood and sleep disturbance. The patient is not nervous/anxious.           PHYSICAL EXAMINATION:     VITAL SIGNS:  /66   Pulse 109   Temp 97.3 °F (36.3 °C)   Resp 16   Wt 109 kg (240 lb 3.2 oz)   SpO2 94%   BMI 38.77 kg/m²     Physical Exam  Vitals  and nursing note reviewed.   Constitutional:       Appearance: Normal appearance. She is well-developed. She is obese.   HENT:      Head: Normocephalic and atraumatic.      Nose: Nose normal.      Mouth/Throat:      Mouth: Mucous membranes are moist.      Pharynx: No oropharyngeal exudate.   Eyes:      General: No scleral icterus.     Conjunctiva/sclera: Conjunctivae normal.      Pupils: Pupils are equal, round, and reactive to light.   Neck:      Thyroid: No thyromegaly.   Cardiovascular:      Rate and Rhythm: Normal rate. Rhythm irregular.      Heart sounds: Normal heart sounds. No murmur heard.    No friction rub. No gallop.   Pulmonary:      Effort: Pulmonary effort is normal. No respiratory distress.      Breath sounds: Normal breath sounds. No wheezing.   Abdominal:      General: Bowel sounds are normal. There is no distension.      Palpations: Abdomen is soft.      Tenderness: There is no abdominal tenderness.   Musculoskeletal:         General: No deformity or signs of injury.      Cervical back: Normal range of motion and neck supple.      Right lower leg: Edema present.      Left lower leg: Edema present.      Comments: Left upper extremity AV fistula well-functioning however large hematoma is noted proximal to this site.   Lymphadenopathy:      Cervical: No cervical adenopathy.   Skin:     General: Skin is warm and dry.      Findings: No rash.   Neurological:      Mental Status: She is alert and oriented to person, place, and time.   Psychiatric:         Mood and Affect: Mood normal.         Behavior: Behavior normal.         RECORDS REVIEW:   Labs: 2/17/2023 sodium 135, creatinine 3.0, hemoglobin 11.8, A1c 7.1    ASSESSMENT     Diagnoses and all orders for this visit:    1. Type 2 diabetes mellitus with nephropathy (HCC) (Primary)    2. Essential hypertension    3. Gastroesophageal reflux disease with esophagitis and hemorrhage    4. ESRD (end stage renal disease) (HCC)    5. Bilateral edema of lower  extremity    6. Acquired hypothyroidism        PLAN  Type 2 diabetes mellitus with hyperglycemia  - Glucose levels tend to run high at dinnertime/evening.  Start NovoLog 4 units with lunch and dinner in addition to sliding scale.    -As mealtime insulin is increased, reduce Levemir to 10 units twice daily to prevent lows during morning time    Left upper extremity AV fistula hematoma  - Site is still functional.  Continue dialysis per nephrology.  Accessing 6 pacific portions of the fistula was instructed by vascular surgery.  These instructions will be forwarded to dialysis clinic.      End-stage renal disease  - Continue hemodialysis as scheduled 3 times a week.  -CBC, CMP, A1c every 3 months.     Type 2 diabetes mellitus  -Glucose levels remains stable with levemir and SSI.      Vaginal Bleeding  - CT imaging 5/7/22 was suggesting calcified uterine fibroids.  No signs of recurrence at this time.  We will continue to monitor.     Diarrhea  -  C. difficile was negative, appears to be secondary to dialysis.  Continue supportive care.       Bilateral Lower Extremity Edema  - better control with elevation and compression     Acute blood loss anemia secondary to esophageal ulcers  -Cont iron supplements.  CBC being monitored by nephrology regularly.      GERD  - Continue PPI.     Diabetic gastroparesis  - Well controlled with Reglan.     Essential hypertension  - Stable controlled with current medication regimen.     Atrial fibrillation  - Stable control of rhythm.  Continue current cardiac medications.  - Continue Eliquis.     Anxiety  - Stable on Lexapro and Seroquel.     Hypothyroidism  - TSH is at goal.  Continue current dose of Synthroid.        [x]  Discussed Patient in detail with nursing/staff, addressed all needs today.     [x]  Plan of Care Reviewed   []  PT/OT Reviewed   [x]  Order Changes  []  Discharge Plans Reviewed  [x]  Advance Directive on file with Nursing Home.   [x]  POA on file with Nursing Home.     [x]  Code Status listed and reviewed.     I confirm accuracy of unchanged data/findings including physical exam and plan which have been carried forward from previous visit, as well as I have updated appropriately those that have changed        Rafiq Baron DO.  3/20/2023

## 2023-05-08 NOTE — PROGRESS NOTES
Discharge Planning Assessment  [facility]     Patient Name: Millicent Mckeon  MRN: [id number]  Today's Date: 1/21/2019    Admit Date: 1/10/2019    Discharge Needs Assessment    No documentation.       Discharge Plan     Row Name 01/21/19 1530       Plan    Plan  Per Dr Reyes, plan is to DC to [facility] on Tuesday.  Updated sister [place].     Patient/Family in Agreement with Plan  yes        Destination - Selection Complete      Service Provider Request Status Selected Services Address Phone Number Fax Number    [facility] Selected Skilled Nursing [address], [facility] [phone]      Durable Medical Equipment      No service coordination in this encounter.      Dialysis/Infusion      No service coordination in this encounter.      Home Medical Care      No service coordination in this encounter.      Community Resources      No service coordination in this encounter.        Expected Discharge Date and Time     Expected Discharge Date Expected Discharge Time    Jan 22, 2019         Demographic Summary    No documentation.       Functional Status    No documentation.       Psychosocial    No documentation.       Abuse/Neglect    No documentation.       Legal    No documentation.       Substance Abuse    No documentation.       Patient Forms    No documentation.           Cookie Mckeon     Detail Level: Detailed

## 2023-05-09 ENCOUNTER — NURSING HOME (OUTPATIENT)
Dept: INTERNAL MEDICINE | Facility: CLINIC | Age: 63
End: 2023-05-09
Payer: MEDICARE

## 2023-05-09 VITALS
OXYGEN SATURATION: 96 % | TEMPERATURE: 97.3 F | HEART RATE: 92 BPM | DIASTOLIC BLOOD PRESSURE: 89 MMHG | SYSTOLIC BLOOD PRESSURE: 147 MMHG | RESPIRATION RATE: 18 BRPM

## 2023-05-09 DIAGNOSIS — E11.21 TYPE 2 DIABETES MELLITUS WITH NEPHROPATHY: ICD-10-CM

## 2023-05-09 DIAGNOSIS — N18.6 ESRD (END STAGE RENAL DISEASE): ICD-10-CM

## 2023-05-09 DIAGNOSIS — E03.9 ACQUIRED HYPOTHYROIDISM: ICD-10-CM

## 2023-05-09 DIAGNOSIS — D62 ACUTE BLOOD LOSS ANEMIA: ICD-10-CM

## 2023-05-09 DIAGNOSIS — I10 ESSENTIAL HYPERTENSION: Primary | ICD-10-CM

## 2023-05-09 DIAGNOSIS — K21.01 GASTROESOPHAGEAL REFLUX DISEASE WITH ESOPHAGITIS AND HEMORRHAGE: ICD-10-CM

## 2023-05-09 DIAGNOSIS — R60.0 BILATERAL EDEMA OF LOWER EXTREMITY: ICD-10-CM

## 2023-05-09 NOTE — PROGRESS NOTES
Nursing Home Progress Note        Rafiqgay Baron DO   [address] [place] [address] Phone: [phone]  Fax: [phone]     PATIENT NAME: Millicent Mckeon                                                                          YOB: 1960           DATE OF SERVICE: 05/09/2023  FACILITY:  [facility]  ______________________________________________________________________     CHIEF COMPLAINT:  Chronic Medical Management      HISTORY OF PRESENT ILLNESS:   Patient was resting comfortably in her bed on exam today.  No new complaints or concerns.  Mood and behaviors have been stable.  Unfortunately, patient has had very poor diet.  Family brings in numerous sugary items including shakes and candy bars and as a result, patient continues to have high glucose levels particularly on nondialysis days.    PAST MEDICAL & SURGICAL HISTORY:   Past Medical History:   Diagnosis Date   • A-fib    • Anemia 10/02/2018   • Diabetes mellitus    • Disease of thyroid gland    • GERD (gastroesophageal reflux disease)    • Gout    • History of transfusion    • Hypertension    • Impaired functional mobility, balance, gait, and endurance       Past Surgical History:   Procedure Laterality Date   • ENDOSCOPY N/A 5/2/2022    Procedure: ESOPHAGOGASTRODUODENOSCOPY WITH BIOPSY;  Surgeon: Jacob Chance MD;  Location: [facility] ENDOSCOPY;  Service: Gastroenterology;  Laterality: N/A;   • EYE SURGERY     • INCISION AND DRAINAGE LEG Right 1/14/2019    Procedure: Right heel incision and drainage with graft application;  Surgeon: Ar Rueda DPM;  Location: [facility] OR;  Service: Podiatry   • INSERTION HEMODIALYSIS CATHETER N/A 4/5/2022    Procedure: HEMODIALYSIS CATHETER INSERTION;  Surgeon: Jean Carlos Guadalupe MD;  Location: [facility] OR;  Service: General;  Laterality: N/A;   • LEG SURGERY           MEDICATIONS:  I have reviewed and reconciled the patients medication list in the patients chart at the [facility]  facility today, 05/09/2023.      ALLERGIES:  Allergies   Allergen Reactions   • Metformin And Related Anaphylaxis     HA, diarrhea, throat swelling   • Metformin GI Intolerance         SOCIAL HISTORY:  Social History     Socioeconomic History   • Marital status: Single   Tobacco Use   • Smoking status: Never   • Smokeless tobacco: Never   Vaping Use   • Vaping Use: Never used   Substance and Sexual Activity   • Alcohol use: No   • Drug use: No   • Sexual activity: Defer       FAMILY HISTORY:  Family History   Problem Relation Age of Onset   • Asthma Mother    • Hypertension Father    • Stroke Father        REVIEW OF SYSTEMS:  Review of Systems   Constitutional: Negative for chills, fatigue and fever.   HENT: Negative for congestion, ear pain, rhinorrhea, sinus pressure and sore throat.    Eyes: Negative for visual disturbance.   Respiratory: Negative for cough, chest tightness, shortness of breath and wheezing.    Cardiovascular: Negative for chest pain, palpitations and leg swelling.   Gastrointestinal: Negative for abdominal pain, blood in stool, constipation, diarrhea, nausea and vomiting.   Endocrine: Negative for polydipsia and polyuria.   Genitourinary: Negative for dysuria and hematuria.   Musculoskeletal: Negative for arthralgias and back pain.   Skin: Negative for rash.   Neurological: Negative for dizziness, light-headedness, numbness and headaches.   Psychiatric/Behavioral: Negative for dysphoric mood and sleep disturbance. The patient is not nervous/anxious.           PHYSICAL EXAMINATION:     VITAL SIGNS:  /89   Pulse 92   Temp 97.3 °F (36.3 °C)   Resp 18   SpO2 96%     Physical Exam  Vitals and nursing note reviewed.   Constitutional:       Appearance: Normal appearance. She is well-developed. She is obese.   HENT:      Head: Normocephalic and atraumatic.      Nose: Nose normal.      Mouth/Throat:      Mouth: Mucous membranes are moist.      Pharynx: No oropharyngeal exudate.   Eyes:       General: No scleral icterus.     Conjunctiva/sclera: Conjunctivae normal.      Pupils: Pupils are equal, round, and reactive to light.   Neck:      Thyroid: No thyromegaly.   Cardiovascular:      Rate and Rhythm: Normal rate. Rhythm irregular.      Heart sounds: Normal heart sounds. No murmur heard.    No friction rub. No gallop.   Pulmonary:      Effort: Pulmonary effort is normal. No respiratory distress.      Breath sounds: Normal breath sounds. No wheezing.   Abdominal:      General: Bowel sounds are normal. There is no distension.      Palpations: Abdomen is soft.      Tenderness: There is no abdominal tenderness.   Musculoskeletal:         General: No deformity or signs of injury.      Cervical back: Normal range of motion and neck supple.      Right lower leg: Edema present.      Left lower leg: Edema present.      Comments: Left upper extremity AV fistula well-functioning however large hematoma is noted proximal to this site.   Lymphadenopathy:      Cervical: No cervical adenopathy.   Skin:     General: Skin is warm and dry.      Findings: No rash.   Neurological:      Mental Status: She is alert and oriented to person, place, and time.   Psychiatric:         Mood and Affect: Mood normal.         Behavior: Behavior normal.         RECORDS REVIEW:        ASSESSMENT     Diagnoses and all orders for this visit:    1. Essential hypertension (Primary)    2. Gastroesophageal reflux disease with esophagitis and hemorrhage    3. Type 2 diabetes mellitus with nephropathy    4. ESRD (end stage renal disease)    5. Bilateral edema of lower extremity    6. Acquired hypothyroidism    7. Acute blood loss anemia        PLAN  Type 2 diabetes mellitus with hyperglycemia  - Glucose levels still running high due to poor diet compliance.  - Patient counseled once again to avoid and reduce sugary food intake.  - Continue mealtime NovoLog.  Adjust sliding scale to 4 units for every 50 above 200.  -Continue with Levemir to 10  units twice daily to prevent lows during morning time    Left upper extremity AV fistula hematoma  - Site is still functional.  Continue dialysis per nephrology.  Accessing specific portions of the fistula was instructed by vascular surgery.  These instructions will be forwarded to dialysis clinic.    End-stage renal disease  - Continue hemodialysis as scheduled 3 times a week.  -CBC, CMP, A1c every 3 months.     Type 2 diabetes mellitus  -Glucose levels remains stable with levemir and SSI.      Vaginal Bleeding  - CT imaging 5/7/22 was suggesting calcified uterine fibroids.  No signs of recurrence at this time.  We will continue to monitor.     Diarrhea  -  C. difficile was negative, appears to be secondary to dialysis.  Continue supportive care.     Bilateral Lower Extremity Edema  -Fair control with elevation and compression     Acute blood loss anemia secondary to esophageal ulcers  -Cont iron supplements.  CBC being monitored by nephrology regularly.      GERD  - Continue PPI.     Diabetic gastroparesis  - Well controlled with Reglan.     Essential hypertension  - Stable controlled with current medication regimen.     Atrial fibrillation  - Stable control of rhythm.  Continue current cardiac medications.  - Continue Eliquis.     Anxiety  - Stable on Lexapro and Seroquel.     Hypothyroidism  - TSH is at goal.  Continue current dose of Synthroid.        [x]  Discussed Patient in detail with nursing/staff, addressed all needs today.     [x]  Plan of Care Reviewed   []  PT/OT Reviewed   []  Order Changes  []  Discharge Plans Reviewed  [x]  Advance Directive on file with Nursing Home.   [x]  POA on file with Nursing Home.    [x]  Code Status listed and reviewed.     I confirm accuracy of unchanged data/findings including physical exam and plan which have been carried forward from previous visit, as well as I have updated appropriately those that have changed        Rafiq Baron DO.  5/11/2023

## 2023-05-09 NOTE — LETTER
Nursing Home Progress Note        Rafiq Baron DO   [address] [place] [address] Phone: [phone]  Fax: [phone]     PATIENT NAME: Millicent Mckeon                                                                          YOB: 1960           DATE OF SERVICE: 05/09/2023  FACILITY:  [facility]  ______________________________________________________________________     CHIEF COMPLAINT:  Chronic Medical Management      HISTORY OF PRESENT ILLNESS:   Patient was resting comfortably in her bed on exam today.  No new complaints or concerns.  Mood and behaviors have been stable.  Unfortunately, patient has had very poor diet.  Family brings in numerous sugary items including shakes and candy bars and as a result, patient continues to have high glucose levels particularly on nondialysis days.    PAST MEDICAL & SURGICAL HISTORY:   Past Medical History:   Diagnosis Date    A-fib     Anemia 10/02/2018    Diabetes mellitus     Disease of thyroid gland     GERD (gastroesophageal reflux disease)     Gout     History of transfusion     Hypertension     Impaired functional mobility, balance, gait, and endurance       Past Surgical History:   Procedure Laterality Date    ENDOSCOPY N/A 5/2/2022    Procedure: ESOPHAGOGASTRODUODENOSCOPY WITH BIOPSY;  Surgeon: Jacob Chance MD;  Location: [facility] ENDOSCOPY;  Service: Gastroenterology;  Laterality: N/A;    EYE SURGERY      INCISION AND DRAINAGE LEG Right 1/14/2019    Procedure: Right heel incision and drainage with graft application;  Surgeon: Ar Rueda DPM;  Location: [facility] OR;  Service: Podiatry    INSERTION HEMODIALYSIS CATHETER N/A 4/5/2022    Procedure: HEMODIALYSIS CATHETER INSERTION;  Surgeon: Jean Carlos Guadalupe MD;  Location: [facility] OR;  Service: General;  Laterality: N/A;    LEG SURGERY           MEDICATIONS:  I have reviewed and reconciled the patients medication list in the patients chart at the skilled nursing facility today,  05/09/2023.      ALLERGIES:  Allergies   Allergen Reactions    Metformin And Related Anaphylaxis     HA, diarrhea, throat swelling    Metformin GI Intolerance         SOCIAL HISTORY:  Social History     Socioeconomic History    Marital status: Single   Tobacco Use    Smoking status: Never    Smokeless tobacco: Never   Vaping Use    Vaping Use: Never used   Substance and Sexual Activity    Alcohol use: No    Drug use: No    Sexual activity: Defer       FAMILY HISTORY:  Family History   Problem Relation Age of Onset    Asthma Mother     Hypertension Father     Stroke Father        REVIEW OF SYSTEMS:  Review of Systems   Constitutional: Negative for chills, fatigue and fever.   HENT: Negative for congestion, ear pain, rhinorrhea, sinus pressure and sore throat.    Eyes: Negative for visual disturbance.   Respiratory: Negative for cough, chest tightness, shortness of breath and wheezing.    Cardiovascular: Negative for chest pain, palpitations and leg swelling.   Gastrointestinal: Negative for abdominal pain, blood in stool, constipation, diarrhea, nausea and vomiting.   Endocrine: Negative for polydipsia and polyuria.   Genitourinary: Negative for dysuria and hematuria.   Musculoskeletal: Negative for arthralgias and back pain.   Skin: Negative for rash.   Neurological: Negative for dizziness, light-headedness, numbness and headaches.   Psychiatric/Behavioral: Negative for dysphoric mood and sleep disturbance. The patient is not nervous/anxious.           PHYSICAL EXAMINATION:     VITAL SIGNS:  /89   Pulse 92   Temp 97.3 °F (36.3 °C)   Resp 18   SpO2 96%     Physical Exam  Vitals and nursing note reviewed.   Constitutional:       Appearance: Normal appearance. She is well-developed. She is obese.   HENT:      Head: Normocephalic and atraumatic.      Nose: Nose normal.      Mouth/Throat:      Mouth: Mucous membranes are moist.      Pharynx: No oropharyngeal exudate.   Eyes:      General: No scleral icterus.      Conjunctiva/sclera: Conjunctivae normal.      Pupils: Pupils are equal, round, and reactive to light.   Neck:      Thyroid: No thyromegaly.   Cardiovascular:      Rate and Rhythm: Normal rate. Rhythm irregular.      Heart sounds: Normal heart sounds. No murmur heard.    No friction rub. No gallop.   Pulmonary:      Effort: Pulmonary effort is normal. No respiratory distress.      Breath sounds: Normal breath sounds. No wheezing.   Abdominal:      General: Bowel sounds are normal. There is no distension.      Palpations: Abdomen is soft.      Tenderness: There is no abdominal tenderness.   Musculoskeletal:         General: No deformity or signs of injury.      Cervical back: Normal range of motion and neck supple.      Right lower leg: Edema present.      Left lower leg: Edema present.      Comments: Left upper extremity AV fistula well-functioning however large hematoma is noted proximal to this site.   Lymphadenopathy:      Cervical: No cervical adenopathy.   Skin:     General: Skin is warm and dry.      Findings: No rash.   Neurological:      Mental Status: She is alert and oriented to person, place, and time.   Psychiatric:         Mood and Affect: Mood normal.         Behavior: Behavior normal.         RECORDS REVIEW:        ASSESSMENT     Diagnoses and all orders for this visit:    1. Essential hypertension (Primary)    2. Gastroesophageal reflux disease with esophagitis and hemorrhage    3. Type 2 diabetes mellitus with nephropathy    4. ESRD (end stage renal disease)    5. Bilateral edema of lower extremity    6. Acquired hypothyroidism    7. Acute blood loss anemia        PLAN  Type 2 diabetes mellitus with hyperglycemia  - Glucose levels still running high due to poor diet compliance.  - Patient counseled once again to avoid and reduce sugary food intake.  - Continue mealtime NovoLog.  Adjust sliding scale to 4 units for every 50 above 200.  -Continue with Levemir to 10 units twice daily to prevent lows  during morning time    Left upper extremity AV fistula hematoma  - Site is still functional.  Continue dialysis per nephrology.  Accessing specific portions of the fistula was instructed by vascular surgery.  These instructions will be forwarded to dialysis clinic.    End-stage renal disease  - Continue hemodialysis as scheduled 3 times a week.  -CBC, CMP, A1c every 3 months.     Type 2 diabetes mellitus  -Glucose levels remains stable with levemir and SSI.      Vaginal Bleeding  - CT imaging 5/7/22 was suggesting calcified uterine fibroids.  No signs of recurrence at this time.  We will continue to monitor.     Diarrhea  -  C. difficile was negative, appears to be secondary to dialysis.  Continue supportive care.     Bilateral Lower Extremity Edema  -Fair control with elevation and compression     Acute blood loss anemia secondary to esophageal ulcers  -Cont iron supplements.  CBC being monitored by nephrology regularly.      GERD  - Continue PPI.     Diabetic gastroparesis  - Well controlled with Reglan.     Essential hypertension  - Stable controlled with current medication regimen.     Atrial fibrillation  - Stable control of rhythm.  Continue current cardiac medications.  - Continue Eliquis.     Anxiety  - Stable on Lexapro and Seroquel.     Hypothyroidism  - TSH is at goal.  Continue current dose of Synthroid.        [x]  Discussed Patient in detail with nursing/staff, addressed all needs today.     [x]  Plan of Care Reviewed   []  PT/OT Reviewed   []  Order Changes  []  Discharge Plans Reviewed  [x]  Advance Directive on file with Nursing Home.   [x]  POA on file with Nursing Home.    [x]  Code Status listed and reviewed.     I confirm accuracy of unchanged data/findings including physical exam and plan which have been carried forward from previous visit, as well as I have updated appropriately those that have changed        Rafiq Baron DO.  5/11/2023

## 2023-05-31 ENCOUNTER — APPOINTMENT (OUTPATIENT)
Dept: CT IMAGING | Facility: HOSPITAL | Age: 63
End: 2023-05-31
Payer: MEDICARE

## 2023-05-31 ENCOUNTER — HOSPITAL ENCOUNTER (EMERGENCY)
Facility: HOSPITAL | Age: 63
Discharge: HOME | End: 2023-05-31
Attending: EMERGENCY MEDICINE | Admitting: EMERGENCY MEDICINE
Payer: MEDICARE

## 2023-05-31 VITALS
SYSTOLIC BLOOD PRESSURE: 179 MMHG | HEART RATE: 106 BPM | OXYGEN SATURATION: 94 % | DIASTOLIC BLOOD PRESSURE: 92 MMHG | TEMPERATURE: 98.6 F | WEIGHT: 245 LBS | HEIGHT: 66 IN | RESPIRATION RATE: 18 BRPM | BODY MASS INDEX: 39.37 KG/M2

## 2023-05-31 DIAGNOSIS — R51.9 ACUTE NONINTRACTABLE HEADACHE, UNSPECIFIED HEADACHE TYPE: Primary | ICD-10-CM

## 2023-05-31 LAB
ALBUMIN SERPL-MCNC: 4.8 G/DL (ref 3.5–5.2)
ALBUMIN/GLOB SERPL: 1.4 G/DL
ALP SERPL-CCNC: 252 U/L (ref 39–117)
ALT SERPL W P-5'-P-CCNC: 11 U/L (ref 1–33)
ANION GAP SERPL CALCULATED.3IONS-SCNC: 17.7 MMOL/L (ref 5–15)
AST SERPL-CCNC: 16 U/L (ref 1–32)
BASOPHILS # BLD AUTO: 0.05 10*3/MM3 (ref 0–0.2)
BASOPHILS NFR BLD AUTO: 0.5 % (ref 0–1.5)
BILIRUB SERPL-MCNC: 0.4 MG/DL (ref 0–1.2)
BUN SERPL-MCNC: 20 MG/DL (ref 8–23)
BUN/CREAT SERPL: 6.7 (ref 7–25)
CALCIUM SPEC-SCNC: 10.1 MG/DL (ref 8.6–10.5)
CHLORIDE SERPL-SCNC: 93 MMOL/L (ref 98–107)
CO2 SERPL-SCNC: 24.3 MMOL/L (ref 22–29)
CREAT SERPL-MCNC: 2.97 MG/DL (ref 0.57–1)
DEPRECATED RDW RBC AUTO: 44.6 FL (ref 37–54)
EGFRCR SERPLBLD CKD-EPI 2021: 17.1 ML/MIN/1.73
EOSINOPHIL # BLD AUTO: 0.08 10*3/MM3 (ref 0–0.4)
EOSINOPHIL NFR BLD AUTO: 0.9 % (ref 0.3–6.2)
ERYTHROCYTE [DISTWIDTH] IN BLOOD BY AUTOMATED COUNT: 13.8 % (ref 12.3–15.4)
GLOBULIN UR ELPH-MCNC: 3.4 GM/DL
GLUCOSE SERPL-MCNC: 240 MG/DL (ref 65–99)
HCT VFR BLD AUTO: 37.6 % (ref 34–46.6)
HGB BLD-MCNC: 12.6 G/DL (ref 12–15.9)
HOLD SPECIMEN: NORMAL
HOLD SPECIMEN: NORMAL
IMM GRANULOCYTES # BLD AUTO: 0.04 10*3/MM3 (ref 0–0.05)
IMM GRANULOCYTES NFR BLD AUTO: 0.4 % (ref 0–0.5)
LIPASE SERPL-CCNC: 17 U/L (ref 13–60)
LYMPHOCYTES # BLD AUTO: 0.87 10*3/MM3 (ref 0.7–3.1)
LYMPHOCYTES NFR BLD AUTO: 9.3 % (ref 19.6–45.3)
MAGNESIUM SERPL-MCNC: 1.9 MG/DL (ref 1.6–2.4)
MCH RBC QN AUTO: 29.2 PG (ref 26.6–33)
MCHC RBC AUTO-ENTMCNC: 33.5 G/DL (ref 31.5–35.7)
MCV RBC AUTO: 87.2 FL (ref 79–97)
MONOCYTES # BLD AUTO: 0.41 10*3/MM3 (ref 0.1–0.9)
MONOCYTES NFR BLD AUTO: 4.4 % (ref 5–12)
NEUTROPHILS NFR BLD AUTO: 7.89 10*3/MM3 (ref 1.7–7)
NEUTROPHILS NFR BLD AUTO: 84.5 % (ref 42.7–76)
NRBC BLD AUTO-RTO: 0 /100 WBC (ref 0–0.2)
PLATELET # BLD AUTO: 181 10*3/MM3 (ref 140–450)
PMV BLD AUTO: 10 FL (ref 6–12)
POTASSIUM SERPL-SCNC: 4.7 MMOL/L (ref 3.5–5.2)
PROT SERPL-MCNC: 8.2 G/DL (ref 6–8.5)
RBC # BLD AUTO: 4.31 10*6/MM3 (ref 3.77–5.28)
SODIUM SERPL-SCNC: 135 MMOL/L (ref 136–145)
WBC NRBC COR # BLD: 9.34 10*3/MM3 (ref 3.4–10.8)
WHOLE BLOOD HOLD COAG: NORMAL
WHOLE BLOOD HOLD SPECIMEN: NORMAL

## 2023-05-31 PROCEDURE — 70450 CT HEAD/BRAIN W/O DYE: CPT

## 2023-05-31 PROCEDURE — 83690 ASSAY OF LIPASE: CPT

## 2023-05-31 PROCEDURE — 80053 COMPREHEN METABOLIC PANEL: CPT

## 2023-05-31 PROCEDURE — 85025 COMPLETE CBC W/AUTO DIFF WBC: CPT

## 2023-05-31 PROCEDURE — 93005 ELECTROCARDIOGRAM TRACING: CPT | Performed by: EMERGENCY MEDICINE

## 2023-05-31 PROCEDURE — 83735 ASSAY OF MAGNESIUM: CPT

## 2023-05-31 PROCEDURE — 25010000002 DIPHENHYDRAMINE PER 50 MG

## 2023-05-31 PROCEDURE — 96374 THER/PROPH/DIAG INJ IV PUSH: CPT

## 2023-05-31 PROCEDURE — 99284 EMERGENCY DEPT VISIT MOD MDM: CPT

## 2023-05-31 PROCEDURE — 25010000002 METOCLOPRAMIDE PER 10 MG

## 2023-05-31 PROCEDURE — 96375 TX/PRO/DX INJ NEW DRUG ADDON: CPT

## 2023-05-31 PROCEDURE — 36415 COLL VENOUS BLD VENIPUNCTURE: CPT

## 2023-05-31 RX ORDER — METOCLOPRAMIDE HYDROCHLORIDE 5 MG/ML
10 INJECTION INTRAMUSCULAR; INTRAVENOUS ONCE
Status: COMPLETED | OUTPATIENT
Start: 2023-05-31 | End: 2023-05-31

## 2023-05-31 RX ORDER — DIPHENHYDRAMINE HYDROCHLORIDE 50 MG/ML
25 INJECTION INTRAMUSCULAR; INTRAVENOUS ONCE
Status: COMPLETED | OUTPATIENT
Start: 2023-05-31 | End: 2023-05-31

## 2023-05-31 RX ADMIN — DIPHENHYDRAMINE HYDROCHLORIDE 25 MG: 50 INJECTION, SOLUTION INTRAMUSCULAR; INTRAVENOUS at 20:01

## 2023-05-31 RX ADMIN — METOCLOPRAMIDE HYDROCHLORIDE 10 MG: 5 INJECTION INTRAMUSCULAR; INTRAVENOUS at 20:00

## 2023-05-31 NOTE — ED PROVIDER NOTES
Subjective  History of Present Illness:    Patient is a 62-year-old female here today with headache, nausea/vomiting, and high blood pressure.  She was brought in by EMS from [facility].  Patient states that she had dialysis earlier this morning and did not have any issue before or during the treatment.  She states that after her dialysis was done there was an issue getting her fistula to stop bleeding.  She reports a large amount of blood was lost this way.  Since then she has been experiencing headache, increased blood pressure, nausea, vomiting, and was found to be in atrial flutter per EMS.  He goes to dialysis on Monday, Wednesday, and Friday.  Her other significant medical history includes diabetes and hypertension, GERD and atrial fibrillation.  She is denying any shortness of breath or chest pain.    Nurses Notes reviewed and agree, including vitals, allergies, social history and prior medical history.     REVIEW OF SYSTEMS: All systems reviewed and not pertinent unless noted.  Review of Systems    Past Medical History:   Diagnosis Date   • A-fib    • Anemia 10/02/2018   • Diabetes mellitus    • Disease of thyroid gland    • GERD (gastroesophageal reflux disease)    • Gout    • History of transfusion    • Hypertension    • Impaired functional mobility, balance, gait, and endurance        Allergies:    Metformin and related and Metformin      Past Surgical History:   Procedure Laterality Date   • ENDOSCOPY N/A 5/2/2022    Procedure: ESOPHAGOGASTRODUODENOSCOPY WITH BIOPSY;  Surgeon: Jacob Chance MD;  Location: [facility] ENDOSCOPY;  Service: Gastroenterology;  Laterality: N/A;   • EYE SURGERY     • INCISION AND DRAINAGE LEG Right 1/14/2019    Procedure: Right heel incision and drainage with graft application;  Surgeon: Ar Rueda DPM;  Location: [facility] OR;  Service: Podiatry   • INSERTION HEMODIALYSIS CATHETER N/A 4/5/2022    Procedure: HEMODIALYSIS CATHETER INSERTION;  Surgeon: Collins  "MD Jean Carlos;  Location: [facility];  Service: General;  Laterality: N/A;   • LEG SURGERY           Social History     Socioeconomic History   • Marital status: Single   Tobacco Use   • Smoking status: Never   • Smokeless tobacco: Never   Vaping Use   • Vaping Use: Never used   Substance and Sexual Activity   • Alcohol use: No   • Drug use: No   • Sexual activity: Defer         Family History   Problem Relation Age of Onset   • Asthma Mother    • Hypertension Father    • Stroke Father        Objective  Physical Exam:  BP (!) 186/107   Pulse 103   Temp 98.6 °F (37 °C) (Oral)   Resp 18   Ht 167.6 cm (66\")   Wt 111 kg (245 lb)   SpO2 97%   BMI 39.54 kg/m²      Physical Exam  Vitals and nursing note reviewed.   Constitutional:       Appearance: Normal appearance. She is obese.   HENT:      Head: Normocephalic and atraumatic.      Right Ear: Tympanic membrane, ear canal and external ear normal.      Left Ear: Tympanic membrane, ear canal and external ear normal.      Nose: Nose normal.      Mouth/Throat:      Mouth: Mucous membranes are moist.      Pharynx: Oropharynx is clear.   Eyes:      Extraocular Movements: Extraocular movements intact.      Conjunctiva/sclera: Conjunctivae normal.      Pupils: Pupils are equal, round, and reactive to light.   Neck:      Vascular: No carotid bruit.   Cardiovascular:      Rate and Rhythm: Normal rate and regular rhythm.      Pulses: Normal pulses.      Heart sounds: Normal heart sounds.      Arteriovenous access: left arteriovenous access is present.  Pulmonary:      Effort: Pulmonary effort is normal.      Breath sounds: Normal breath sounds.   Abdominal:      General: Abdomen is flat. Bowel sounds are normal. There is no distension.      Palpations: Abdomen is soft.      Tenderness: There is no abdominal tenderness.   Musculoskeletal:      Cervical back: Neck supple. No tenderness.      Right lower leg: No edema.      Left lower leg: No edema.   Lymphadenopathy:      " Cervical: No cervical adenopathy.   Skin:     General: Skin is warm and dry.      Capillary Refill: Capillary refill takes less than 2 seconds.   Neurological:      General: No focal deficit present.      Mental Status: She is alert and oriented to person, place, and time.   Psychiatric:         Mood and Affect: Mood normal.         Behavior: Behavior normal.         Procedures    ED Course:    ED Course as of 05/31/23 2302   Wed May 31, 2023   2205 EKG interpreted by me reveals atrial tachycardia with a rate of 103 bpm.  Possible atrial flutter.  Nonspecific T wave changes.  This is an abnormal appearing EKG. [TB]      ED Course User Index  [TB] Giana Gilbert MD       Lab Results (last 24 hours)     Procedure Component Value Units Date/Time    CBC & Differential [[id number]]  (Abnormal) Collected: 05/31/23 1925    Specimen: Blood Updated: 05/31/23 1941    Narrative:      The following orders were created for panel order CBC & Differential.  Procedure                               Abnormality         Status                     ---------                               -----------         ------                     CBC Auto Differential[[id number]]        Abnormal            Final result                 Please view results for these tests on the individual orders.    Comprehensive Metabolic Panel [[id number]]  (Abnormal) Collected: 05/31/23 1925    Specimen: Blood Updated: 05/31/23 2007     Glucose 240 mg/dL      Comment: Glucose >180, Hemoglobin A1C recommended.        BUN 20 mg/dL      Creatinine 2.97 mg/dL      Sodium 135 mmol/L      Potassium 4.7 mmol/L      Comment: Slight hemolysis detected by analyzer. Results may be affected.        Chloride 93 mmol/L      CO2 24.3 mmol/L      Calcium 10.1 mg/dL      Total Protein 8.2 g/dL      Albumin 4.8 g/dL      ALT (SGPT) 11 U/L      AST (SGOT) 16 U/L      Alkaline Phosphatase 252 U/L      Total Bilirubin 0.4 mg/dL      Globulin 3.4 gm/dL      A/G Ratio 1.4 g/dL       BUN/Creatinine Ratio 6.7     Anion Gap 17.7 mmol/L      eGFR 17.1 mL/min/1.73     Narrative:      GFR Normal >60  Chronic Kidney Disease <60  Kidney Failure <15      Lipase [[id number]]  (Normal) Collected: 05/31/23 1925    Specimen: Blood Updated: 05/31/23 2007     Lipase 17 U/L     Magnesium [[id number]]  (Normal) Collected: 05/31/23 1925    Specimen: Blood Updated: 05/31/23 2007     Magnesium 1.9 mg/dL     CBC Auto Differential [[id number]]  (Abnormal) Collected: 05/31/23 1925    Specimen: Blood Updated: 05/31/23 1941     WBC 9.34 10*3/mm3      RBC 4.31 10*6/mm3      Hemoglobin 12.6 g/dL      Hematocrit 37.6 %      MCV 87.2 fL      MCH 29.2 pg      MCHC 33.5 g/dL      RDW 13.8 %      RDW-SD 44.6 fl      MPV 10.0 fL      Platelets 181 10*3/mm3      Neutrophil % 84.5 %      Lymphocyte % 9.3 %      Monocyte % 4.4 %      Eosinophil % 0.9 %      Basophil % 0.5 %      Immature Grans % 0.4 %      Neutrophils, Absolute 7.89 10*3/mm3      Lymphocytes, Absolute 0.87 10*3/mm3      Monocytes, Absolute 0.41 10*3/mm3      Eosinophils, Absolute 0.08 10*3/mm3      Basophils, Absolute 0.05 10*3/mm3      Immature Grans, Absolute 0.04 10*3/mm3      nRBC 0.0 /100 WBC            CT Head Without Contrast    Result Date: 5/31/2023  FINAL REPORT TECHNIQUE: Multiple axial CT images were performed from the foramen magnum to the vertex. This study was performed with techniques to keep radiation doses as low as reasonably achievable (ALARA). Individualized dose reduction techniques using automated exposure control or adjustment of mA and/or kV according to the patient's size were employed. CLINICAL HISTORY: headache  frontal ha pain, n/v FINDINGS: There is a chronic left cerebellar infarct. There is mild generalized cerebral atrophy.  There is decreased attenuation in the white matter, consistent with mild small vessel chronic ischemic change.  The ventricles are enlarged.  There is no evidence of edema or hemorrhage.  No masses are  identified.  No extra-axial fluid is seen.  The paranasal sinuses are unremarkable.     Impression: Mild atrophy and chronic changes without acute process. Authenticated and Electronically Signed by Jonathan Garay MD on 05/31/2023 09:11:27 PM         MDM    Initial impression of presenting illness: Headache, nausea, vomiting    DDX: includes but is not limited to: Migraine headache, tension headache, rebound hypertension, electrolyte abnormality    Patient arrives hypertensive with vitals interpreted by myself.     Pertinent features from physical exam: No obvious abnormalities noted on exam, fistula noted to left upper arm.  Patient does appear to be in pain and is experiencing light sensitivity    Initial diagnostic plan: CBC, CMP, lipase, magnesium, and CT head    Results from initial plan were reviewed and interpreted by me revealing labs appear to be at the patient's baseline and in relation to her renal function.  White blood count is 9.34, lipase is 17, and magnesium is 1.9.    Diagnostic information from other sources: Medical record    Interventions / Re-evaluation: Patient was given Reglan and Benadryl only to treat her migraine as she cannot receive Toradol and did not want to cause fluid overload by giving her a liter bolus for her headache.  She was ultimately found to have a negative CT scan and no obvious changes to her labs.    Upon reevaluation of the patient to inquire if her headache had improved she was asleep.    Results/clinical rationale were discussed with Dr. Gilbert    Consultations/Discussion of results with other physicians: None    Disposition plan: Patient discharged back to the nursing facility in stable condition.  -----    Final diagnoses:   Acute nonintractable headache, unspecified headache type        Van Johnston, APRN  05/31/23 [id number]

## 2023-06-01 NOTE — DISCHARGE INSTRUCTIONS
Work-up today appears to show lab results similar to your baseline kidney function.  Continue your other medications as previously directed.  Follow-up with your primary provider as needed or return to the ER for any new or worsening symptoms.

## 2023-07-18 ENCOUNTER — NURSING HOME (OUTPATIENT)
Dept: INTERNAL MEDICINE | Facility: CLINIC | Age: 63
End: 2023-07-18
Payer: MEDICARE

## 2023-07-18 VITALS
TEMPERATURE: 97.9 F | OXYGEN SATURATION: 97 % | RESPIRATION RATE: 16 BRPM | HEART RATE: 74 BPM | DIASTOLIC BLOOD PRESSURE: 74 MMHG | SYSTOLIC BLOOD PRESSURE: 136 MMHG

## 2023-07-18 DIAGNOSIS — I50.32 CHRONIC DIASTOLIC CONGESTIVE HEART FAILURE: ICD-10-CM

## 2023-07-18 DIAGNOSIS — D62 ACUTE BLOOD LOSS ANEMIA: ICD-10-CM

## 2023-07-18 DIAGNOSIS — E03.9 ACQUIRED HYPOTHYROIDISM: ICD-10-CM

## 2023-07-18 DIAGNOSIS — E11.21 TYPE 2 DIABETES MELLITUS WITH NEPHROPATHY: ICD-10-CM

## 2023-07-18 DIAGNOSIS — N18.6 ESRD (END STAGE RENAL DISEASE): Primary | ICD-10-CM

## 2023-07-18 DIAGNOSIS — K21.01 GASTROESOPHAGEAL REFLUX DISEASE WITH ESOPHAGITIS AND HEMORRHAGE: ICD-10-CM

## 2023-07-18 DIAGNOSIS — I10 ESSENTIAL HYPERTENSION: ICD-10-CM

## 2023-07-18 DIAGNOSIS — R60.0 BILATERAL EDEMA OF LOWER EXTREMITY: ICD-10-CM

## 2023-07-18 PROCEDURE — 99309 SBSQ NF CARE MODERATE MDM 30: CPT | Performed by: INTERNAL MEDICINE

## 2023-07-18 NOTE — LETTER
Nursing Home Progress Note        Rafiq Baron DO   [address] [place] [address] Phone: [phone]  Fax: [phone]     PATIENT NAME: Millicent Mckeon                                                                          YOB: 1960           DATE OF SERVICE: 07/18/2023  FACILITY:  [facility]  ______________________________________________________________________     CHIEF COMPLAINT:  Chronic Medical Management      HISTORY OF PRESENT ILLNESS:   Patient was seen resting calmly sitting up in her wheelchair.  She was content and having snack.  She was happy with her care overall and no specific complaints.  Glucose levels were notably high upon discussion with nursing staff in the facility.  Unfortunately, patient has no interest in making any dietary changes to lose weight.    PAST MEDICAL & SURGICAL HISTORY:   Past Medical History:   Diagnosis Date    A-fib     Anemia 10/02/2018    Diabetes mellitus     Disease of thyroid gland     GERD (gastroesophageal reflux disease)     Gout     History of transfusion     Hypertension     Impaired functional mobility, balance, gait, and endurance       Past Surgical History:   Procedure Laterality Date    ENDOSCOPY N/A 5/2/2022    Procedure: ESOPHAGOGASTRODUODENOSCOPY WITH BIOPSY;  Surgeon: Jacob Chance MD;  Location: [facility] ENDOSCOPY;  Service: Gastroenterology;  Laterality: N/A;    EYE SURGERY      INCISION AND DRAINAGE LEG Right 1/14/2019    Procedure: Right heel incision and drainage with graft application;  Surgeon: Ar Rueda DPM;  Location: [facility] OR;  Service: Podiatry    INSERTION HEMODIALYSIS CATHETER N/A 4/5/2022    Procedure: HEMODIALYSIS CATHETER INSERTION;  Surgeon: Jean Carlos Guadalupe MD;  Location: [facility] OR;  Service: General;  Laterality: N/A;    LEG SURGERY           MEDICATIONS:  I have reviewed and reconciled the patients medication list in the patients chart at the skilled nursing facility today, 07/18/2023.       ALLERGIES:  Allergies   Allergen Reactions    Metformin And Related Anaphylaxis     HA, diarrhea, throat swelling    Metformin GI Intolerance         SOCIAL HISTORY:  Social History     Socioeconomic History    Marital status: Single   Tobacco Use    Smoking status: Never    Smokeless tobacco: Never   Vaping Use    Vaping Use: Never used   Substance and Sexual Activity    Alcohol use: No    Drug use: No    Sexual activity: Defer       FAMILY HISTORY:  Family History   Problem Relation Age of Onset    Asthma Mother     Hypertension Father     Stroke Father        REVIEW OF SYSTEMS:  Review of Systems   Constitutional:  Negative for chills, fatigue and fever.   HENT:  Negative for congestion, ear pain, rhinorrhea, sinus pressure and sore throat.    Eyes:  Negative for visual disturbance.   Respiratory:  Negative for cough, chest tightness, shortness of breath and wheezing.    Cardiovascular:  Negative for chest pain, palpitations and leg swelling.   Gastrointestinal:  Negative for abdominal pain, blood in stool, constipation, diarrhea, nausea and vomiting.   Endocrine: Negative for polydipsia and polyuria.   Genitourinary:  Negative for dysuria and hematuria.   Musculoskeletal:  Negative for arthralgias and back pain.   Skin:  Negative for rash.   Neurological:  Negative for dizziness, light-headedness, numbness and headaches.   Psychiatric/Behavioral:  Negative for dysphoric mood and sleep disturbance. The patient is not nervous/anxious.         PHYSICAL EXAMINATION:     VITAL SIGNS:  /74   Pulse 74   Temp 97.9 °F (36.6 °C)   Resp 16   SpO2 97%     Physical Exam  Vitals and nursing note reviewed.   Constitutional:       Appearance: Normal appearance. She is well-developed. She is obese.   HENT:      Head: Normocephalic and atraumatic.      Nose: Nose normal.      Mouth/Throat:      Mouth: Mucous membranes are moist.      Pharynx: No oropharyngeal exudate.   Eyes:      General: No scleral icterus.      Conjunctiva/sclera: Conjunctivae normal.      Pupils: Pupils are equal, round, and reactive to light.   Neck:      Thyroid: No thyromegaly.   Cardiovascular:      Rate and Rhythm: Normal rate. Rhythm irregular.      Heart sounds: Normal heart sounds. No murmur heard.    No friction rub. No gallop.   Pulmonary:      Effort: Pulmonary effort is normal. No respiratory distress.      Breath sounds: Normal breath sounds. No wheezing.   Abdominal:      General: Bowel sounds are normal. There is no distension.      Palpations: Abdomen is soft.      Tenderness: There is no abdominal tenderness.   Musculoskeletal:         General: No deformity or signs of injury.      Cervical back: Normal range of motion and neck supple.      Right lower leg: Edema present.      Left lower leg: Edema present.      Comments: Left upper extremity AV fistula well-functioning however large hematoma is noted proximal to this site.   Lymphadenopathy:      Cervical: No cervical adenopathy.   Skin:     General: Skin is warm and dry.      Findings: No rash.   Neurological:      Mental Status: She is alert and oriented to person, place, and time.   Psychiatric:         Mood and Affect: Mood normal.         Behavior: Behavior normal.       RECORDS REVIEW:   In facility reviewed.  Glucose ranging from 200-300.    ASSESSMENT     Diagnoses and all orders for this visit:    1. ESRD (end stage renal disease) (Primary)    2. Essential hypertension    3. Gastroesophageal reflux disease with esophagitis and hemorrhage    4. Type 2 diabetes mellitus with nephropathy    5. Acquired hypothyroidism    6. Bilateral edema of lower extremity    7. Chronic diastolic congestive heart failure    8. Acute blood loss anemia        PLAN  Type 2 diabetes mellitus with hyperglycemia  - Glucose levels still running high due to poor diet compliance.  - Patient counseled once again to avoid and reduce sugary food intake.  -Continue current insulin regimen.  In order to better  control her appetite and craving for food, we will start Ozempic 0.25 mg weekly.  Increasing insulin further with only for patient at risk for further obesity.  -Facility nurse practitioners will assist with adjusting insulin regimen over the next several weeks.    Left upper extremity AV fistula hematoma  - Site is still functional.  Continue dialysis per nephrology.  Accessing specific portions of the fistula was instructed by vascular surgery.  These instructions will be forwarded to dialysis clinic.    End-stage renal disease  - Continue hemodialysis as scheduled 3 times a week.  -CBC, CMP, A1c every 3 months.     Type 2 diabetes mellitus  -Glucose levels remains stable with levemir and SSI.      Vaginal Bleeding  - CT imaging 5/7/22 was suggesting calcified uterine fibroids.  No signs of recurrence at this time.  We will continue to monitor.     Diarrhea  -  C. difficile was negative, appears to be secondary to dialysis.  Continue supportive care.     Bilateral Lower Extremity Edema  -Fair control with elevation and compression     Acute blood loss anemia secondary to esophageal ulcers  -Cont iron supplements.  CBC being monitored by nephrology regularly.      GERD  - Continue PPI.     Diabetic gastroparesis  - Well controlled with Reglan.     Essential hypertension  - Stable controlled with current medication regimen.     Atrial fibrillation  - Stable control of rhythm.  Continue current cardiac medications.  - Continue Eliquis.     Anxiety  - Stable on Lexapro and Seroquel.     Hypothyroidism  - TSH is at goal.  Continue current dose of Synthroid.        [x]  Discussed Patient in detail with nursing/staff, addressed all needs today.     [x]  Plan of Care Reviewed   []  PT/OT Reviewed   [x]  Order Changes  []  Discharge Plans Reviewed  [x]  Advance Directive on file with Nursing Home.   [x]  POA on file with Nursing Home.    [x]  Code Status listed and reviewed.     I confirm accuracy of unchanged  data/findings including physical exam and plan which have been carried forward from previous visit, as well as I have updated appropriately those that have changed        Rafiq Baron DO.  7/24/2023

## 2023-07-18 NOTE — PROGRESS NOTES
Nursing Home Progress Note        Rafiq Baron DO   [address] [place] [address] Phone: [phone]  Fax: [phone]     PATIENT NAME: Millicent Mckeon                                                                          YOB: 1960           DATE OF SERVICE: 07/18/2023  FACILITY:  [facility]  ______________________________________________________________________     CHIEF COMPLAINT:  Chronic Medical Management      HISTORY OF PRESENT ILLNESS:   Patient was seen resting calmly sitting up in her wheelchair.  She was content and having snack.  She was happy with her care overall and no specific complaints.  Glucose levels were notably high upon discussion with nursing staff in the facility.  Unfortunately, patient has no interest in making any dietary changes to lose weight.    PAST MEDICAL & SURGICAL HISTORY:   Past Medical History:   Diagnosis Date    A-fib     Anemia 10/02/2018    Diabetes mellitus     Disease of thyroid gland     GERD (gastroesophageal reflux disease)     Gout     History of transfusion     Hypertension     Impaired functional mobility, balance, gait, and endurance       Past Surgical History:   Procedure Laterality Date    ENDOSCOPY N/A 5/2/2022    Procedure: ESOPHAGOGASTRODUODENOSCOPY WITH BIOPSY;  Surgeon: Jacob Chance MD;  Location: [facility] ENDOSCOPY;  Service: Gastroenterology;  Laterality: N/A;    EYE SURGERY      INCISION AND DRAINAGE LEG Right 1/14/2019    Procedure: Right heel incision and drainage with graft application;  Surgeon: Ar Rueda DPM;  Location: [facility] OR;  Service: Podiatry    INSERTION HEMODIALYSIS CATHETER N/A 4/5/2022    Procedure: HEMODIALYSIS CATHETER INSERTION;  Surgeon: Jean Carlos Guadalupe MD;  Location: [facility] OR;  Service: General;  Laterality: N/A;    LEG SURGERY           MEDICATIONS:  I have reviewed and reconciled the patients medication list in the patients chart at the skilled nursing facility today, 07/18/2023.       ALLERGIES:  Allergies   Allergen Reactions    Metformin And Related Anaphylaxis     HA, diarrhea, throat swelling    Metformin GI Intolerance         SOCIAL HISTORY:  Social History     Socioeconomic History    Marital status: Single   Tobacco Use    Smoking status: Never    Smokeless tobacco: Never   Vaping Use    Vaping Use: Never used   Substance and Sexual Activity    Alcohol use: No    Drug use: No    Sexual activity: Defer       FAMILY HISTORY:  Family History   Problem Relation Age of Onset    Asthma Mother     Hypertension Father     Stroke Father        REVIEW OF SYSTEMS:  Review of Systems   Constitutional:  Negative for chills, fatigue and fever.   HENT:  Negative for congestion, ear pain, rhinorrhea, sinus pressure and sore throat.    Eyes:  Negative for visual disturbance.   Respiratory:  Negative for cough, chest tightness, shortness of breath and wheezing.    Cardiovascular:  Negative for chest pain, palpitations and leg swelling.   Gastrointestinal:  Negative for abdominal pain, blood in stool, constipation, diarrhea, nausea and vomiting.   Endocrine: Negative for polydipsia and polyuria.   Genitourinary:  Negative for dysuria and hematuria.   Musculoskeletal:  Negative for arthralgias and back pain.   Skin:  Negative for rash.   Neurological:  Negative for dizziness, light-headedness, numbness and headaches.   Psychiatric/Behavioral:  Negative for dysphoric mood and sleep disturbance. The patient is not nervous/anxious.         PHYSICAL EXAMINATION:     VITAL SIGNS:  /74   Pulse 74   Temp 97.9 °F (36.6 °C)   Resp 16   SpO2 97%     Physical Exam  Vitals and nursing note reviewed.   Constitutional:       Appearance: Normal appearance. She is well-developed. She is obese.   HENT:      Head: Normocephalic and atraumatic.      Nose: Nose normal.      Mouth/Throat:      Mouth: Mucous membranes are moist.      Pharynx: No oropharyngeal exudate.   Eyes:      General: No scleral icterus.      Conjunctiva/sclera: Conjunctivae normal.      Pupils: Pupils are equal, round, and reactive to light.   Neck:      Thyroid: No thyromegaly.   Cardiovascular:      Rate and Rhythm: Normal rate. Rhythm irregular.      Heart sounds: Normal heart sounds. No murmur heard.    No friction rub. No gallop.   Pulmonary:      Effort: Pulmonary effort is normal. No respiratory distress.      Breath sounds: Normal breath sounds. No wheezing.   Abdominal:      General: Bowel sounds are normal. There is no distension.      Palpations: Abdomen is soft.      Tenderness: There is no abdominal tenderness.   Musculoskeletal:         General: No deformity or signs of injury.      Cervical back: Normal range of motion and neck supple.      Right lower leg: Edema present.      Left lower leg: Edema present.      Comments: Left upper extremity AV fistula well-functioning however large hematoma is noted proximal to this site.   Lymphadenopathy:      Cervical: No cervical adenopathy.   Skin:     General: Skin is warm and dry.      Findings: No rash.   Neurological:      Mental Status: She is alert and oriented to person, place, and time.   Psychiatric:         Mood and Affect: Mood normal.         Behavior: Behavior normal.       RECORDS REVIEW:   In facility reviewed.  Glucose ranging from 200-300.    ASSESSMENT     Diagnoses and all orders for this visit:    1. ESRD (end stage renal disease) (Primary)    2. Essential hypertension    3. Gastroesophageal reflux disease with esophagitis and hemorrhage    4. Type 2 diabetes mellitus with nephropathy    5. Acquired hypothyroidism    6. Bilateral edema of lower extremity    7. Chronic diastolic congestive heart failure    8. Acute blood loss anemia        PLAN  Type 2 diabetes mellitus with hyperglycemia  - Glucose levels still running high due to poor diet compliance.  - Patient counseled once again to avoid and reduce sugary food intake.  -Continue current insulin regimen.  In order to better  control her appetite and craving for food, we will start Ozempic 0.25 mg weekly.  Increasing insulin further with only for patient at risk for further obesity.  -Facility nurse practitioners will assist with adjusting insulin regimen over the next several weeks.    Left upper extremity AV fistula hematoma  - Site is still functional.  Continue dialysis per nephrology.  Accessing specific portions of the fistula was instructed by vascular surgery.  These instructions will be forwarded to dialysis clinic.    End-stage renal disease  - Continue hemodialysis as scheduled 3 times a week.  -CBC, CMP, A1c every 3 months.     Type 2 diabetes mellitus  -Glucose levels remains stable with levemir and SSI.      Vaginal Bleeding  - CT imaging 5/7/22 was suggesting calcified uterine fibroids.  No signs of recurrence at this time.  We will continue to monitor.     Diarrhea  -  C. difficile was negative, appears to be secondary to dialysis.  Continue supportive care.     Bilateral Lower Extremity Edema  -Fair control with elevation and compression     Acute blood loss anemia secondary to esophageal ulcers  -Cont iron supplements.  CBC being monitored by nephrology regularly.      GERD  - Continue PPI.     Diabetic gastroparesis  - Well controlled with Reglan.     Essential hypertension  - Stable controlled with current medication regimen.     Atrial fibrillation  - Stable control of rhythm.  Continue current cardiac medications.  - Continue Eliquis.     Anxiety  - Stable on Lexapro and Seroquel.     Hypothyroidism  - TSH is at goal.  Continue current dose of Synthroid.        [x]  Discussed Patient in detail with nursing/staff, addressed all needs today.     [x]  Plan of Care Reviewed   []  PT/OT Reviewed   [x]  Order Changes  []  Discharge Plans Reviewed  [x]  Advance Directive on file with Nursing Home.   [x]  POA on file with Nursing Home.    [x]  Code Status listed and reviewed.     I confirm accuracy of unchanged  data/findings including physical exam and plan which have been carried forward from previous visit, as well as I have updated appropriately those that have changed        Rafiq Baron DO.  7/24/2023

## 2023-08-09 ENCOUNTER — NURSING HOME (OUTPATIENT)
Dept: INTERNAL MEDICINE | Facility: CLINIC | Age: 63
End: 2023-08-09
Payer: MEDICARE

## 2023-08-09 DIAGNOSIS — E11.21 TYPE 2 DIABETES MELLITUS WITH NEPHROPATHY: ICD-10-CM

## 2023-08-09 DIAGNOSIS — N18.6 ESRD (END STAGE RENAL DISEASE): Primary | ICD-10-CM

## 2023-08-09 PROCEDURE — 99308 SBSQ NF CARE LOW MDM 20: CPT

## 2023-08-09 NOTE — PROGRESS NOTES
Nursing Home Progress Note        Praful Archuleta DO []  SNEHA Salmon []  [address] [place] [address]  Phone: [phone]  Fax: [phone] Beto Aguayo MD []  Rafiq Baron DO []  Nancy Rodriguez PA-C [x]   [address] [place] [address]  Phone: [phone]  Fax: [phone]     PATIENT NAME: Millicent Mckeon                                                                          YOB: 1960           DATE OF SERVICE: 08/09/2023  FACILITY:  [x] [facility]   [] [facility]   [] [facility]   [] [facility]   [] Other ______________________________________________________________________     CHIEF COMPLAINT:  Uncontrolled diabetes      HISTORY OF PRESENT ILLNESS:   Patient is a 63 y.o. female seen today at request of nursing staff and routine follow up regarding chronic conditions. She was seen by Dr. Baron 7/18 for glucose levels in 300s. At that visit, he adjusted insulin and added Ozempic 0.25 to regimen.    Today, patient is sitting in wheelchair comfortably. She is snacking on a Sugar Baby snack. Endorses she is interested in losing weight. Discussion held between patient, myself and nurse regarding her diet. She currently has a full size candy bar, multiple grilled cheese and sugar baby snacks daily. Unfortunately, she does not show any interested in dietary modifications. Her glucose levels has improved and ranges between 140s-200s. Her dinner glucose ranges between 200-260. She has had 3 weeks of Ozempic 0.25.     PAST MEDICAL & SURGICAL HISTORY:   Past Medical History:   Diagnosis Date    A-fib     Anemia 10/02/2018    Diabetes mellitus     Disease of thyroid gland     GERD (gastroesophageal reflux disease)     Gout     History of transfusion     Hypertension     Impaired functional mobility, balance, gait, and endurance       Past Surgical History:   Procedure Laterality Date    ENDOSCOPY N/A 5/2/2022    Procedure: ESOPHAGOGASTRODUODENOSCOPY WITH BIOPSY;   Surgeon: Jacob Chance MD;  Location: [facility] ENDOSCOPY;  Service: Gastroenterology;  Laterality: N/A;    EYE SURGERY      INCISION AND DRAINAGE LEG Right 1/14/2019    Procedure: Right heel incision and drainage with graft application;  Surgeon: Ar Rueda DPM;  Location: [facility] OR;  Service: Podiatry    INSERTION HEMODIALYSIS CATHETER N/A 4/5/2022    Procedure: HEMODIALYSIS CATHETER INSERTION;  Surgeon: Jean Carlos Guadalupe MD;  Location: [facility] OR;  Service: General;  Laterality: N/A;    LEG SURGERY           MEDICATIONS:  I have reviewed and reconciled the patients medication list in the patients chart at the skilled nursing facility today.      ALLERGIES:  Allergies   Allergen Reactions    Metformin And Related Anaphylaxis     HA, diarrhea, throat swelling    Metformin GI Intolerance         SOCIAL HISTORY:  Social History     Socioeconomic History    Marital status: Single   Tobacco Use    Smoking status: Never    Smokeless tobacco: Never   Vaping Use    Vaping Use: Never used   Substance and Sexual Activity    Alcohol use: No    Drug use: No    Sexual activity: Defer       FAMILY HISTORY:  Family History   Problem Relation Age of Onset    Asthma Mother     Hypertension Father     Stroke Father          PHYSICAL EXAMINATION:     VITAL SIGNS:  There were no vitals taken for this visit.    Physical Exam  Vitals and nursing note reviewed.   Constitutional:       General: She is not in acute distress.     Appearance: She is obese. She is not ill-appearing.   HENT:      Head: Normocephalic and atraumatic.   Cardiovascular:      Rate and Rhythm: Normal rate. Rhythm irregular.      Pulses: Normal pulses.      Heart sounds: Normal heart sounds.   Pulmonary:      Effort: Pulmonary effort is normal.      Breath sounds: Normal breath sounds.   Abdominal:      Palpations: Abdomen is soft.   Musculoskeletal:      Right lower leg: Edema present.      Left lower leg: Edema present.   Skin:     General: Skin is  dry.   Neurological:      Mental Status: Mental status is at baseline.   Psychiatric:         Mood and Affect: Mood normal.         Behavior: Behavior normal.       RECORDS REVIEW:   I have reviewed and interpreted the following lab test results  today.     5/18/2023 Labs:  A1c: 6.7%  CMP: Na 138, K 4.0, BUN 7, Cr 3.7, GFR 12  Glucose ranges improving - 140-200    ASSESSMENT     Diagnoses and all orders for this visit:    1. ESRD (end stage renal disease) (Primary)    2. Type 2 diabetes mellitus with nephropathy      PLAN  ESRD  - continue dialysis 3x/week.  - repeat CBC, CMP, A1c and every 3 months.     2. DMII: uncontrolled  - Glucose levels remain elevated due to poor diet compliance but has improved with Ozempic  - Counseling provided regarding dietary changes  - Will titrate up Ozempic to 0.5mg weekly in one week after a complete month of 0.25mg dosage. Tolerating well.  - At this time will not increase insulin - risk for further obesity      [x]  Discussed Patient in detail with nursing/staff, addressed all needs today.     [x]  Plan of Care Reviewed   []  PT/OT Reviewed   []  Order Changes  []  Discharge Plans Reviewed  [x]  Advance Directive on file with Nursing Home.   [x]  POA on file with Nursing Home.    [x]  Code Status listed:  []  Full Code   [x]  DNR    I spent 22 minutes in direct patient care including face to face and floor time.        YING St[place] [facility]  [address]  [facility] [id number] [phone]

## 2023-08-09 NOTE — LETTER
Nursing Home Progress Note        Praful Archuleta DO []  SNEHA Salmon []  [address] [place] [address]  Phone: [phone]  Fax: [phone] Beto Aguayo MD []  Rafiq Baron DO []  Nancy Rodriguez PA-C [x]   [address] [place] [address]  Phone: [phone]  Fax: [phone]     PATIENT NAME: Millicent Mckeon                                                                          YOB: 1960           DATE OF SERVICE: 08/09/2023  FACILITY:  [x] [facility]   [] [facility]   [] [facility]   [] [facility]   [] Other ______________________________________________________________________     CHIEF COMPLAINT:  Uncontrolled diabetes      HISTORY OF PRESENT ILLNESS:   Patient is a 63 y.o. female seen today at request of nursing staff and routine follow up regarding chronic conditions. She was seen by Dr. Baron 7/18 for glucose levels in 300s. At that visit, he adjusted insulin and added Ozempic 0.25 to regimen.    Today, patient is sitting in wheelchair comfortably. She is snacking on a Sugar Baby snack. Endorses she is interested in losing weight. Discussion held between patient, myself and nurse regarding her diet. She currently has a full size candy bar, multiple grilled cheese and sugar baby snacks daily. Unfortunately, she does not show any interested in dietary modifications. Her glucose levels has improved and ranges between 140s-200s. Her dinner glucose ranges between 200-260. She has had 3 weeks of Ozempic 0.25.     PAST MEDICAL & SURGICAL HISTORY:   Past Medical History:   Diagnosis Date    A-fib     Anemia 10/02/2018    Diabetes mellitus     Disease of thyroid gland     GERD (gastroesophageal reflux disease)     Gout     History of transfusion     Hypertension     Impaired functional mobility, balance, gait, and endurance       Past Surgical History:   Procedure Laterality Date    ENDOSCOPY N/A 5/2/2022    Procedure: ESOPHAGOGASTRODUODENOSCOPY WITH BIOPSY;   Surgeon: Jacob Chance MD;  Location: [facility] ENDOSCOPY;  Service: Gastroenterology;  Laterality: N/A;    EYE SURGERY      INCISION AND DRAINAGE LEG Right 1/14/2019    Procedure: Right heel incision and drainage with graft application;  Surgeon: Ar Rueda DPM;  Location: [facility] OR;  Service: Podiatry    INSERTION HEMODIALYSIS CATHETER N/A 4/5/2022    Procedure: HEMODIALYSIS CATHETER INSERTION;  Surgeon: Jean Carlos Guadalupe MD;  Location: [facility] OR;  Service: General;  Laterality: N/A;    LEG SURGERY           MEDICATIONS:  I have reviewed and reconciled the patients medication list in the patients chart at the skilled nursing facility today.      ALLERGIES:  Allergies   Allergen Reactions    Metformin And Related Anaphylaxis     HA, diarrhea, throat swelling    Metformin GI Intolerance         SOCIAL HISTORY:  Social History     Socioeconomic History    Marital status: Single   Tobacco Use    Smoking status: Never    Smokeless tobacco: Never   Vaping Use    Vaping Use: Never used   Substance and Sexual Activity    Alcohol use: No    Drug use: No    Sexual activity: Defer       FAMILY HISTORY:  Family History   Problem Relation Age of Onset    Asthma Mother     Hypertension Father     Stroke Father          PHYSICAL EXAMINATION:     VITAL SIGNS:  There were no vitals taken for this visit.    Physical Exam  Vitals and nursing note reviewed.   Constitutional:       General: She is not in acute distress.     Appearance: She is obese. She is not ill-appearing.   HENT:      Head: Normocephalic and atraumatic.   Cardiovascular:      Rate and Rhythm: Normal rate. Rhythm irregular.      Pulses: Normal pulses.      Heart sounds: Normal heart sounds.   Pulmonary:      Effort: Pulmonary effort is normal.      Breath sounds: Normal breath sounds.   Abdominal:      Palpations: Abdomen is soft.   Musculoskeletal:      Right lower leg: Edema present.      Left lower leg: Edema present.   Skin:     General: Skin is  dry.   Neurological:      Mental Status: Mental status is at baseline.   Psychiatric:         Mood and Affect: Mood normal.         Behavior: Behavior normal.       RECORDS REVIEW:   I have reviewed and interpreted the following lab test results  today.     5/18/2023 Labs:  A1c: 6.7%  CMP: Na 138, K 4.0, BUN 7, Cr 3.7, GFR 12  Glucose ranges improving - 140-200    ASSESSMENT     Diagnoses and all orders for this visit:    1. ESRD (end stage renal disease) (Primary)    2. Type 2 diabetes mellitus with nephropathy      PLAN  ESRD  - continue dialysis 3x/week.  - repeat CBC, CMP, A1c and every 3 months.     2. DMII: uncontrolled  - Glucose levels remain elevated due to poor diet compliance but has improved with Ozempic  - Counseling provided regarding dietary changes  - Will titrate up Ozempic to 0.5mg weekly in one week after a complete month of 0.25mg dosage. Tolerating well.  - At this time will not increase insulin - risk for further obesity      [x]  Discussed Patient in detail with nursing/staff, addressed all needs today.     [x]  Plan of Care Reviewed   []  PT/OT Reviewed   []  Order Changes  []  Discharge Plans Reviewed  [x]  Advance Directive on file with Nursing Home.   [x]  POA on file with Nursing Home.    [x]  Code Status listed:  []  Full Code   [x]  DNR    I spent 22 minutes in direct patient care including face to face and floor time.        [facility] PRIMARY [facility]ARE  [address]  [facility] [address] [phone]

## 2023-08-31 ENCOUNTER — NURSING HOME (OUTPATIENT)
Dept: INTERNAL MEDICINE | Facility: CLINIC | Age: 63
End: 2023-08-31
Payer: MEDICARE

## 2023-08-31 VITALS
BODY MASS INDEX: 39.06 KG/M2 | OXYGEN SATURATION: 92 % | SYSTOLIC BLOOD PRESSURE: 129 MMHG | WEIGHT: 242 LBS | TEMPERATURE: 97.2 F | RESPIRATION RATE: 18 BRPM | DIASTOLIC BLOOD PRESSURE: 61 MMHG | HEART RATE: 102 BPM

## 2023-08-31 DIAGNOSIS — D64.9 CHRONIC ANEMIA: ICD-10-CM

## 2023-08-31 DIAGNOSIS — I50.32 CHRONIC DIASTOLIC CONGESTIVE HEART FAILURE: ICD-10-CM

## 2023-08-31 DIAGNOSIS — E03.9 ACQUIRED HYPOTHYROIDISM: ICD-10-CM

## 2023-08-31 DIAGNOSIS — I10 ESSENTIAL HYPERTENSION: ICD-10-CM

## 2023-08-31 DIAGNOSIS — E11.21 TYPE 2 DIABETES MELLITUS WITH NEPHROPATHY: Primary | ICD-10-CM

## 2023-08-31 DIAGNOSIS — N18.6 ESRD (END STAGE RENAL DISEASE): ICD-10-CM

## 2023-08-31 DIAGNOSIS — K21.01 GASTROESOPHAGEAL REFLUX DISEASE WITH ESOPHAGITIS AND HEMORRHAGE: ICD-10-CM

## 2023-08-31 NOTE — PROGRESS NOTES
Nursing Home Progress Note        Rafiq Baron DO   [address] [place] [address] Phone: [phone]  Fax: [phone]     PATIENT NAME: Millicent Mckeon                                                                          YOB: 1960           DATE OF SERVICE: 08/31/2023  FACILITY:  [facility]  ______________________________________________________________________     CHIEF COMPLAINT:  Hypoglycemia      HISTORY OF PRESENT ILLNESS:   Since starting increased dose of Ozempic, patient's diet control has improved drastically and as a result glucose levels have been much better.  Although hypoglycemic symptoms have not been yet reported, nurses are concerned that it could occur over time.      PAST MEDICAL & SURGICAL HISTORY:   Past Medical History:   Diagnosis Date    A-fib     Anemia 10/02/2018    Diabetes mellitus     Disease of thyroid gland     GERD (gastroesophageal reflux disease)     Gout     History of transfusion     Hypertension     Impaired functional mobility, balance, gait, and endurance       Past Surgical History:   Procedure Laterality Date    ENDOSCOPY N/A 5/2/2022    Procedure: ESOPHAGOGASTRODUODENOSCOPY WITH BIOPSY;  Surgeon: Jacob Chance MD;  Location: [facility] ENDOSCOPY;  Service: Gastroenterology;  Laterality: N/A;    EYE SURGERY      INCISION AND DRAINAGE LEG Right 1/14/2019    Procedure: Right heel incision and drainage with graft application;  Surgeon: Ar Rueda DPM;  Location: [facility] OR;  Service: Podiatry    INSERTION HEMODIALYSIS CATHETER N/A 4/5/2022    Procedure: HEMODIALYSIS CATHETER INSERTION;  Surgeon: Jean Carlos Guadalupe MD;  Location: [facility] OR;  Service: General;  Laterality: N/A;    LEG SURGERY           MEDICATIONS:  I have reviewed and reconciled the patients medication list in the patients chart at the skilled nursing facility today, 08/31/2023.      ALLERGIES:  Allergies   Allergen Reactions    Metformin And Related Anaphylaxis      HA, diarrhea, throat swelling    Metformin GI Intolerance         SOCIAL HISTORY:  Social History     Socioeconomic History    Marital status: Single   Tobacco Use    Smoking status: Never    Smokeless tobacco: Never   Vaping Use    Vaping Use: Never used   Substance and Sexual Activity    Alcohol use: No    Drug use: No    Sexual activity: Defer       FAMILY HISTORY:  Family History   Problem Relation Age of Onset    Asthma Mother     Hypertension Father     Stroke Father        REVIEW OF SYSTEMS:  Review of Systems   Constitutional:  Negative for chills, fatigue and fever.   HENT:  Negative for congestion, ear pain, rhinorrhea, sinus pressure and sore throat.    Eyes:  Negative for visual disturbance.   Respiratory:  Negative for cough, chest tightness, shortness of breath and wheezing.    Cardiovascular:  Negative for chest pain, palpitations and leg swelling.   Gastrointestinal:  Negative for abdominal pain, blood in stool, constipation, diarrhea, nausea and vomiting.   Endocrine: Negative for polydipsia and polyuria.   Genitourinary:  Negative for dysuria and hematuria.   Musculoskeletal:  Negative for arthralgias and back pain.   Skin:  Negative for rash.   Neurological:  Negative for dizziness, light-headedness, numbness and headaches.   Psychiatric/Behavioral:  Negative for dysphoric mood and sleep disturbance. The patient is not nervous/anxious.         PHYSICAL EXAMINATION:     VITAL SIGNS:  /61   Pulse 102   Temp 97.2 øF (36.2 øC)   Resp 18   Wt 110 kg (242 lb)   SpO2 92%   BMI 39.06 kg/mý     Physical Exam  Vitals and nursing note reviewed.   Constitutional:       Appearance: Normal appearance. She is well-developed. She is obese.   HENT:      Head: Normocephalic and atraumatic.      Nose: Nose normal.      Mouth/Throat:      Mouth: Mucous membranes are moist.      Pharynx: No oropharyngeal exudate.   Eyes:      General: No scleral icterus.     Conjunctiva/sclera: Conjunctivae normal.       Pupils: Pupils are equal, round, and reactive to light.   Neck:      Thyroid: No thyromegaly.   Cardiovascular:      Rate and Rhythm: Normal rate. Rhythm irregular.      Heart sounds: Normal heart sounds. No murmur heard.    No friction rub. No gallop.   Pulmonary:      Effort: Pulmonary effort is normal. No respiratory distress.      Breath sounds: Normal breath sounds. No wheezing.   Abdominal:      General: Bowel sounds are normal. There is no distension.      Palpations: Abdomen is soft.      Tenderness: There is no abdominal tenderness.   Musculoskeletal:         General: No deformity or signs of injury.      Cervical back: Normal range of motion and neck supple.      Right lower leg: Edema present.      Left lower leg: Edema present.      Comments: Left upper extremity AV fistula well-functioning however large hematoma is noted proximal to this site.   Lymphadenopathy:      Cervical: No cervical adenopathy.   Skin:     General: Skin is warm and dry.      Findings: No rash.   Neurological:      Mental Status: She is alert and oriented to person, place, and time.   Psychiatric:         Mood and Affect: Mood normal.         Behavior: Behavior normal.       RECORDS REVIEW:   Glucose log in facility reviewed, mostly in low 100s over last week.      ASSESSMENT     Diagnoses and all orders for this visit:    1. Type 2 diabetes mellitus with nephropathy (Primary)    2. Essential hypertension    3. Gastroesophageal reflux disease with esophagitis and hemorrhage    4. Acquired hypothyroidism    5. ESRD (end stage renal disease)    6. Chronic diastolic congestive heart failure    7. Chronic anemia        PLAN  Type 2 diabetes mellitus with hypoglycemia  - Ozempic has been increased to 0.5mg weekly and she has been tolerating this well.  Diet control is easier to follow. Glucose levels are dropping.   - Levemir adjusted to 10U QD (from BID)      End-stage renal disease  - Continue hemodialysis as scheduled 3 times a  week.  -CBC, CMP, A1c every 3 months.      Vaginal Bleeding  - CT imaging 5/7/22 was suggesting calcified uterine fibroids.  No signs of recurrence at this time.  We will continue to monitor.     Diarrhea  -  C. difficile was negative, appears to be secondary to dialysis.  Continue supportive care.     Bilateral Lower Extremity Edema  -Fair control with elevation and compression     Acute blood loss anemia secondary to esophageal ulcers  -Cont iron supplements.  CBC being monitored by nephrology regularly.      GERD  - Continue PPI.     Diabetic gastroparesis  - Well controlled with Reglan.     Essential hypertension  - Stable controlled with current medication regimen.     Atrial fibrillation  - Stable control of rhythm.  Continue cardiac medications.  - Continue Eliquis.     Anxiety  - Stable on Lexapro and Seroquel.     Hypothyroidism  - TSH is at goal.  Continue current dose of Synthroid.        [x]  Discussed Patient in detail with nursing/staff, addressed all needs today.     [x]  Plan of Care Reviewed   []  PT/OT Reviewed   [x]  Order Changes  []  Discharge Plans Reviewed  [x]  Advance Directive on file with Nursing Home.   [x]  POA on file with Nursing Home.    [x]  Code Status listed and reviewed.     I confirm accuracy of unchanged data/findings including physical exam and plan which have been carried forward from previous visit, as well as I have updated appropriately those that have changed        Rafiq Baron DO.  9/2/2023

## 2023-08-31 NOTE — LETTER
Nursing Home Progress Note        Rafiq Baron DO   [address] [place] [address] Phone: [phone]  Fax: [phone]     PATIENT NAME: Millicent Mckeon                                                                          YOB: 1960           DATE OF SERVICE: 08/31/2023  FACILITY:  [facility]  ______________________________________________________________________     CHIEF COMPLAINT:  Hypoglycemia      HISTORY OF PRESENT ILLNESS:   Since starting increased dose of Ozempic, patient's diet control has improved drastically and as a result glucose levels have been much better.  Although hypoglycemic symptoms have not been yet reported, nurses are concerned that it could occur over time.      PAST MEDICAL & SURGICAL HISTORY:   Past Medical History:   Diagnosis Date    A-fib     Anemia 10/02/2018    Diabetes mellitus     Disease of thyroid gland     GERD (gastroesophageal reflux disease)     Gout     History of transfusion     Hypertension     Impaired functional mobility, balance, gait, and endurance       Past Surgical History:   Procedure Laterality Date    ENDOSCOPY N/A 5/2/2022    Procedure: ESOPHAGOGASTRODUODENOSCOPY WITH BIOPSY;  Surgeon: Jacob Chance MD;  Location: [facility] ENDOSCOPY;  Service: Gastroenterology;  Laterality: N/A;    EYE SURGERY      INCISION AND DRAINAGE LEG Right 1/14/2019    Procedure: Right heel incision and drainage with graft application;  Surgeon: Ar Rueda DPM;  Location: [facility] OR;  Service: Podiatry    INSERTION HEMODIALYSIS CATHETER N/A 4/5/2022    Procedure: HEMODIALYSIS CATHETER INSERTION;  Surgeon: Jean Carlos Guadalupe MD;  Location: [facility] OR;  Service: General;  Laterality: N/A;    LEG SURGERY           MEDICATIONS:  I have reviewed and reconciled the patients medication list in the patients chart at the skilled nursing facility today, 08/31/2023.      ALLERGIES:  Allergies   Allergen Reactions    Metformin And Related Anaphylaxis      HA, diarrhea, throat swelling    Metformin GI Intolerance         SOCIAL HISTORY:  Social History     Socioeconomic History    Marital status: Single   Tobacco Use    Smoking status: Never    Smokeless tobacco: Never   Vaping Use    Vaping Use: Never used   Substance and Sexual Activity    Alcohol use: No    Drug use: No    Sexual activity: Defer       FAMILY HISTORY:  Family History   Problem Relation Age of Onset    Asthma Mother     Hypertension Father     Stroke Father        REVIEW OF SYSTEMS:  Review of Systems   Constitutional:  Negative for chills, fatigue and fever.   HENT:  Negative for congestion, ear pain, rhinorrhea, sinus pressure and sore throat.    Eyes:  Negative for visual disturbance.   Respiratory:  Negative for cough, chest tightness, shortness of breath and wheezing.    Cardiovascular:  Negative for chest pain, palpitations and leg swelling.   Gastrointestinal:  Negative for abdominal pain, blood in stool, constipation, diarrhea, nausea and vomiting.   Endocrine: Negative for polydipsia and polyuria.   Genitourinary:  Negative for dysuria and hematuria.   Musculoskeletal:  Negative for arthralgias and back pain.   Skin:  Negative for rash.   Neurological:  Negative for dizziness, light-headedness, numbness and headaches.   Psychiatric/Behavioral:  Negative for dysphoric mood and sleep disturbance. The patient is not nervous/anxious.         PHYSICAL EXAMINATION:     VITAL SIGNS:  /61   Pulse 102   Temp 97.2 øF (36.2 øC)   Resp 18   Wt 110 kg (242 lb)   SpO2 92%   BMI 39.06 kg/mý     Physical Exam  Vitals and nursing note reviewed.   Constitutional:       Appearance: Normal appearance. She is well-developed. She is obese.   HENT:      Head: Normocephalic and atraumatic.      Nose: Nose normal.      Mouth/Throat:      Mouth: Mucous membranes are moist.      Pharynx: No oropharyngeal exudate.   Eyes:      General: No scleral icterus.     Conjunctiva/sclera: Conjunctivae normal.       Pupils: Pupils are equal, round, and reactive to light.   Neck:      Thyroid: No thyromegaly.   Cardiovascular:      Rate and Rhythm: Normal rate. Rhythm irregular.      Heart sounds: Normal heart sounds. No murmur heard.    No friction rub. No gallop.   Pulmonary:      Effort: Pulmonary effort is normal. No respiratory distress.      Breath sounds: Normal breath sounds. No wheezing.   Abdominal:      General: Bowel sounds are normal. There is no distension.      Palpations: Abdomen is soft.      Tenderness: There is no abdominal tenderness.   Musculoskeletal:         General: No deformity or signs of injury.      Cervical back: Normal range of motion and neck supple.      Right lower leg: Edema present.      Left lower leg: Edema present.      Comments: Left upper extremity AV fistula well-functioning however large hematoma is noted proximal to this site.   Lymphadenopathy:      Cervical: No cervical adenopathy.   Skin:     General: Skin is warm and dry.      Findings: No rash.   Neurological:      Mental Status: She is alert and oriented to person, place, and time.   Psychiatric:         Mood and Affect: Mood normal.         Behavior: Behavior normal.       RECORDS REVIEW:   Glucose log in facility reviewed, mostly in low 100s over last week.      ASSESSMENT     Diagnoses and all orders for this visit:    1. Type 2 diabetes mellitus with nephropathy (Primary)    2. Essential hypertension    3. Gastroesophageal reflux disease with esophagitis and hemorrhage    4. Acquired hypothyroidism    5. ESRD (end stage renal disease)    6. Chronic diastolic congestive heart failure    7. Chronic anemia        PLAN  Type 2 diabetes mellitus with hypoglycemia  - Ozempic has been increased to 0.5mg weekly and she has been tolerating this well.  Diet control is easier to follow. Glucose levels are dropping.   - Levemir adjusted to 10U QD (from BID)      End-stage renal disease  - Continue hemodialysis as scheduled 3 times a  week.  -CBC, CMP, A1c every 3 months.      Vaginal Bleeding  - CT imaging 5/7/22 was suggesting calcified uterine fibroids.  No signs of recurrence at this time.  We will continue to monitor.     Diarrhea  -  C. difficile was negative, appears to be secondary to dialysis.  Continue supportive care.     Bilateral Lower Extremity Edema  -Fair control with elevation and compression     Acute blood loss anemia secondary to esophageal ulcers  -Cont iron supplements.  CBC being monitored by nephrology regularly.      GERD  - Continue PPI.     Diabetic gastroparesis  - Well controlled with Reglan.     Essential hypertension  - Stable controlled with current medication regimen.     Atrial fibrillation  - Stable control of rhythm.  Continue cardiac medications.  - Continue Eliquis.     Anxiety  - Stable on Lexapro and Seroquel.     Hypothyroidism  - TSH is at goal.  Continue current dose of Synthroid.        [x]  Discussed Patient in detail with nursing/staff, addressed all needs today.     [x]  Plan of Care Reviewed   []  PT/OT Reviewed   [x]  Order Changes  []  Discharge Plans Reviewed  [x]  Advance Directive on file with Nursing Home.   [x]  POA on file with Nursing Home.    [x]  Code Status listed and reviewed.     I confirm accuracy of unchanged data/findings including physical exam and plan which have been carried forward from previous visit, as well as I have updated appropriately those that have changed        Rafiq Baron DO.  9/2/2023

## 2023-09-12 ENCOUNTER — NURSING HOME (OUTPATIENT)
Dept: INTERNAL MEDICINE | Facility: CLINIC | Age: 63
End: 2023-09-12
Payer: MEDICARE

## 2023-09-12 VITALS
HEART RATE: 104 BPM | WEIGHT: 242.3 LBS | BODY MASS INDEX: 39.11 KG/M2 | TEMPERATURE: 97.3 F | OXYGEN SATURATION: 96 % | RESPIRATION RATE: 18 BRPM | DIASTOLIC BLOOD PRESSURE: 84 MMHG | SYSTOLIC BLOOD PRESSURE: 139 MMHG

## 2023-09-12 DIAGNOSIS — N18.6 ESRD (END STAGE RENAL DISEASE): ICD-10-CM

## 2023-09-12 DIAGNOSIS — R60.0 BILATERAL EDEMA OF LOWER EXTREMITY: ICD-10-CM

## 2023-09-12 DIAGNOSIS — K21.01 GASTROESOPHAGEAL REFLUX DISEASE WITH ESOPHAGITIS AND HEMORRHAGE: ICD-10-CM

## 2023-09-12 DIAGNOSIS — I10 ESSENTIAL HYPERTENSION: ICD-10-CM

## 2023-09-12 DIAGNOSIS — E11.21 TYPE 2 DIABETES MELLITUS WITH NEPHROPATHY: Primary | ICD-10-CM

## 2023-09-12 DIAGNOSIS — D64.9 CHRONIC ANEMIA: ICD-10-CM

## 2023-09-12 DIAGNOSIS — I50.32 CHRONIC DIASTOLIC CONGESTIVE HEART FAILURE: ICD-10-CM

## 2023-09-12 DIAGNOSIS — E03.9 ACQUIRED HYPOTHYROIDISM: ICD-10-CM

## 2023-09-12 PROCEDURE — 99309 SBSQ NF CARE MODERATE MDM 30: CPT | Performed by: INTERNAL MEDICINE

## 2023-09-12 NOTE — LETTER
Nursing Home Progress Note        Rafiqgay Baron    [address] [place] [address] Phone: [phone]  Fax: [phone]     PATIENT NAME: Millicent Mckeon                                                                          YOB: 1960           DATE OF SERVICE: 09/12/2023  FACILITY:  [facility]  ______________________________________________________________________     CHIEF COMPLAINT:  Chronic Medical Management      HISTORY OF PRESENT ILLNESS:   Patient was seen for routine compliance visit.  Nurses report that her glucose levels have been significantly improved ever since she was started on higher doses of Ozempic.  Weight remains stable and she has not been requiring much of any sliding scale insulin.  Patient seems comfortable and content.  Upon asking her about her medications, she stated that she did not feel depressed or anxious and was open to the idea of reducing some of her medications.    PAST MEDICAL & SURGICAL HISTORY:   Past Medical History:   Diagnosis Date    A-fib     Anemia 10/02/2018    Diabetes mellitus     Disease of thyroid gland     GERD (gastroesophageal reflux disease)     Gout     History of transfusion     Hypertension     Impaired functional mobility, balance, gait, and endurance       Past Surgical History:   Procedure Laterality Date    ENDOSCOPY N/A 5/2/2022    Procedure: ESOPHAGOGASTRODUODENOSCOPY WITH BIOPSY;  Surgeon: Jacob Chance MD;  Location: [facility] ENDOSCOPY;  Service: Gastroenterology;  Laterality: N/A;    EYE SURGERY      INCISION AND DRAINAGE LEG Right 1/14/2019    Procedure: Right heel incision and drainage with graft application;  Surgeon: Ar Rueda DPM;  Location: [facility] OR;  Service: Podiatry    INSERTION HEMODIALYSIS CATHETER N/A 4/5/2022    Procedure: HEMODIALYSIS CATHETER INSERTION;  Surgeon: Jean Carlos Guadalupe MD;  Location: [facility] OR;  Service: General;  Laterality: N/A;    LEG SURGERY           MEDICATIONS:  I have  reviewed and reconciled the patients medication list in the patients chart at the skilled nursing facility today, 09/12/2023.      ALLERGIES:  Allergies   Allergen Reactions    Metformin And Related Anaphylaxis     HA, diarrhea, throat swelling    Metformin GI Intolerance         SOCIAL HISTORY:  Social History     Socioeconomic History    Marital status: Single   Tobacco Use    Smoking status: Never    Smokeless tobacco: Never   Vaping Use    Vaping Use: Never used   Substance and Sexual Activity    Alcohol use: No    Drug use: No    Sexual activity: Defer       FAMILY HISTORY:  Family History   Problem Relation Age of Onset    Asthma Mother     Hypertension Father     Stroke Father        REVIEW OF SYSTEMS:  Review of Systems   Constitutional:  Negative for chills, fatigue and fever.   HENT:  Negative for congestion, ear pain, rhinorrhea, sinus pressure and sore throat.    Eyes:  Negative for visual disturbance.   Respiratory:  Negative for cough, chest tightness, shortness of breath and wheezing.    Cardiovascular:  Negative for chest pain, palpitations and leg swelling.   Gastrointestinal:  Negative for abdominal pain, blood in stool, constipation, diarrhea, nausea and vomiting.   Endocrine: Negative for polydipsia and polyuria.   Genitourinary:  Negative for dysuria and hematuria.   Musculoskeletal:  Negative for arthralgias and back pain.   Skin:  Negative for rash.   Neurological:  Negative for dizziness, light-headedness, numbness and headaches.   Psychiatric/Behavioral:  Negative for dysphoric mood and sleep disturbance. The patient is not nervous/anxious.         PHYSICAL EXAMINATION:     VITAL SIGNS:  /84   Pulse 104   Temp 97.3 °F (36.3 °C)   Resp 18   Wt 110 kg (242 lb 4.8 oz)   SpO2 96%   BMI 39.11 kg/m²     Physical Exam  Vitals and nursing note reviewed.   Constitutional:       Appearance: Normal appearance. She is well-developed. She is obese.   HENT:      Head: Normocephalic and  atraumatic.      Nose: Nose normal.      Mouth/Throat:      Mouth: Mucous membranes are moist.      Pharynx: No oropharyngeal exudate.   Eyes:      General: No scleral icterus.     Conjunctiva/sclera: Conjunctivae normal.      Pupils: Pupils are equal, round, and reactive to light.   Neck:      Thyroid: No thyromegaly.   Cardiovascular:      Rate and Rhythm: Normal rate. Rhythm irregular.      Heart sounds: Normal heart sounds. No murmur heard.    No friction rub. No gallop.   Pulmonary:      Effort: Pulmonary effort is normal. No respiratory distress.      Breath sounds: Normal breath sounds. No wheezing.   Abdominal:      General: Bowel sounds are normal. There is no distension.      Palpations: Abdomen is soft.      Tenderness: There is no abdominal tenderness.   Musculoskeletal:         General: No deformity or signs of injury.      Cervical back: Normal range of motion and neck supple.      Right lower leg: Edema present.      Left lower leg: Edema present.      Comments: Left upper extremity AV fistula    Lymphadenopathy:      Cervical: No cervical adenopathy.   Skin:     General: Skin is warm and dry.      Findings: No rash.   Neurological:      Mental Status: She is alert and oriented to person, place, and time.   Psychiatric:         Mood and Affect: Mood normal.         Behavior: Behavior normal.       RECORDS REVIEW:   Labs: 8/17/2023 CBC CMP reviewed  Hemoglobin 9.1, A1c 6.6    ASSESSMENT     Diagnoses and all orders for this visit:    1. Type 2 diabetes mellitus with nephropathy (Primary)    2. Essential hypertension    3. Gastroesophageal reflux disease with esophagitis and hemorrhage    4. Chronic diastolic congestive heart failure    5. ESRD (end stage renal disease)    6. Acquired hypothyroidism    7. Chronic anemia    8. Bilateral edema of lower extremity        PLAN  Anxiety  -Has been stable for quite a while on Lexapro and Seroquel.  After discussion, patient was open to the idea of reducing  medications.  Reduce Seroquel to 12.5 mg nightly for 1 week then discontinue.  In a couple of months, we can consider reducing Lexapro.    Type 2 diabetes mellitus with hypoglycemia  - Ozempic at current doses is working very well for the patient's glucose control.  -Continue Levemir 10 units daily with sliding scale.  Fortunately, she has not been requiring much sliding scale.    End-stage renal disease  - Continue hemodialysis as scheduled 3 times a week.  -CBC, CMP, A1c every 3 months.    Vaginal Bleeding  - CT imaging 5/7/22 was suggesting calcified uterine fibroids.  No signs of recurrence at this time.  We will continue to monitor.     Diarrhea  -  C. difficile was negative, appears to be secondary to dialysis.  Continue supportive care.     Bilateral Lower Extremity Edema  -Fair control with elevation and compression     Acute blood loss anemia secondary to esophageal ulcers  -Cont iron supplements.  CBC being monitored by nephrology regularly.      GERD  - Continue PPI.     Diabetic gastroparesis  - Well controlled with Reglan.     Essential hypertension  - Stable controlled with current medication regimen.     Atrial fibrillation  - Stable control of rhythm.  Continue cardiac medications.  - Continue Eliquis.     Hypothyroidism  - TSH is at goal.  Continue current dose of Synthroid.        [x]  Discussed Patient in detail with nursing/staff, addressed all needs today.     [x]  Plan of Care Reviewed   []  PT/OT Reviewed   [x]  Order Changes  []  Discharge Plans Reviewed  [x]  Advance Directive on file with Nursing Home.   [x]  POA on file with Nursing Home.    [x]  Code Status listed and reviewed.     I confirm accuracy of unchanged data/findings including physical exam and plan which have been carried forward from previous visit, as well as I have updated appropriately those that have changed        Rafiq Baron DO.  9/12/2023

## 2023-10-23 NOTE — ED NOTES
Texted Dr. Christelle Kwon and asked him to contact Dr. Kevin King in the ER.      [address]  04/16/19 1943 [employer]

## 2023-11-14 ENCOUNTER — NURSING HOME (OUTPATIENT)
Dept: FAMILY MEDICINE CLINIC | Facility: CLINIC | Age: 63
End: 2023-11-14
Payer: MEDICARE

## 2023-11-14 VITALS
OXYGEN SATURATION: 96 % | HEART RATE: 109 BPM | WEIGHT: 243 LBS | RESPIRATION RATE: 18 BRPM | SYSTOLIC BLOOD PRESSURE: 147 MMHG | TEMPERATURE: 97.7 F | BODY MASS INDEX: 39.22 KG/M2 | DIASTOLIC BLOOD PRESSURE: 85 MMHG

## 2023-11-14 DIAGNOSIS — Z74.09 IMPAIRED FUNCTIONAL MOBILITY AND ACTIVITY TOLERANCE: ICD-10-CM

## 2023-11-14 DIAGNOSIS — R49.0 HOARSENESS: ICD-10-CM

## 2023-11-14 DIAGNOSIS — R05.1 ACUTE COUGH: ICD-10-CM

## 2023-11-14 DIAGNOSIS — J02.9 SORE THROAT: ICD-10-CM

## 2023-11-14 DIAGNOSIS — R09.82 PND (POST-NASAL DRIP): ICD-10-CM

## 2023-11-14 NOTE — LETTER
Nursing Home Follow Up Note      rPaful Archuleta DO []   SNEHA Salmon [x]  [address] [place] [address]  Phone: [phone]  Fax: [phone] Beto Aguayo MD []    Rafiq Baron DO []   [address] [place] [address]  Phone: [phone]  Fax: [phone]     PATIENT NAME: Millicent Mckeon                                                                          YOB: 1960           DATE OF SERVICE: 11/14/2023  FACILITY:  [x][facility]   [] [facility]   [] [facility]   [] [facility]   [] Other ______________________________________________________________________      CHIEF COMPLAINT:    Complaints of coughing, hoarseness and not feeling well since yesterday.      HISTORY OF PRESENT ILLNESS:     Complaints today of coughing, hoarseness and not feeling well since yesterday.  She reports that she just feels its a cold or allergies.  She reports that she does feel better today than she did yesterday.  Most concerns just sore throat and cough related to postnasal drainage.  Moving about her room today in her wheelchair with no signs and symptoms of distress.  She does do dialysis on Monday Wednesday and Friday.    PAST MEDICAL & SURGICAL HISTORY:   Past Medical History:   Diagnosis Date    A-fib     Anemia 10/02/2018    Diabetes mellitus     Disease of thyroid gland     GERD (gastroesophageal reflux disease)     Gout     History of transfusion     Hypertension     Impaired functional mobility, balance, gait, and endurance       Past Surgical History:   Procedure Laterality Date    ENDOSCOPY N/A 5/2/2022    Procedure: ESOPHAGOGASTRODUODENOSCOPY WITH BIOPSY;  Surgeon: Jacob Chance MD;  Location: [facility] ENDOSCOPY;  Service: Gastroenterology;  Laterality: N/A;    EYE SURGERY      INCISION AND DRAINAGE LEG Right 1/14/2019    Procedure: Right heel incision and drainage with graft application;  Surgeon: Ar Rueda DPM;  Location: [facility] OR;  Service: Podiatry     INSERTION HEMODIALYSIS CATHETER N/A 4/5/2022    Procedure: HEMODIALYSIS CATHETER INSERTION;  Surgeon: Jean Carlos Guadalupe MD;  Location: [facility];  Service: General;  Laterality: N/A;    LEG SURGERY           MEDICATIONS:  I have reviewed and reconciled the patients medication list in the patients chart at the skilled nursing facility today.      ALLERGIES:    Allergies   Allergen Reactions    Metformin And Related Anaphylaxis     HA, diarrhea, throat swelling    Metformin GI Intolerance         SOCIAL HISTORY:    Social History     Socioeconomic History    Marital status: Single   Tobacco Use    Smoking status: Never    Smokeless tobacco: Never   Vaping Use    Vaping Use: Never used   Substance and Sexual Activity    Alcohol use: No    Drug use: No    Sexual activity: Defer       FAMILY HISTORY:    Family History   Problem Relation Age of Onset    Asthma Mother     Hypertension Father     Stroke Father        REVIEW OF SYSTEMS:    Review of Systems   Constitutional:  Negative for activity change, appetite change, chills, diaphoresis, fatigue and fever.   HENT:  Positive for postnasal drip, sore throat and voice change. Negative for congestion, ear pain and nosebleeds.    Eyes:  Negative for discharge, redness, itching and visual disturbance.   Respiratory:  Positive for cough. Negative for chest tightness, shortness of breath and wheezing.    Cardiovascular:  Negative for chest pain, palpitations and leg swelling.   Gastrointestinal:  Negative for abdominal distention, abdominal pain, blood in stool, constipation, diarrhea, nausea, vomiting and indigestion.   Genitourinary:  Negative for dysuria, flank pain, frequency, hematuria and urinary incontinence.        Dialysis   Musculoskeletal:  Negative for arthralgias, gait problem and myalgias.   Skin:  Negative for color change and rash.   Allergic/Immunologic: Positive for environmental allergies.   Neurological:  Negative for dizziness, seizures, speech difficulty,  weakness, headache, memory problem and confusion.   Psychiatric/Behavioral:  Negative for behavioral problems, dysphoric mood, hallucinations, sleep disturbance and depressed mood. The patient is not nervous/anxious.          PHYSICAL EXAMINATION:   VITAL SIGNS:   Vitals:    11/14/23 1534   BP: 147/85   Pulse: 109   Resp: 18   Temp: 97.7 °F (36.5 °C)   SpO2: 96%   Weight: 110 kg (243 lb)        Physical Exam  Vitals reviewed.   Constitutional:       Appearance: Normal appearance.   HENT:      Mouth/Throat:      Comments: PND  Eyes:      Conjunctiva/sclera: Conjunctivae normal.   Cardiovascular:      Rate and Rhythm: Normal rate and regular rhythm.   Pulmonary:      Effort: Pulmonary effort is normal.      Breath sounds: Normal breath sounds.   Abdominal:      General: Bowel sounds are normal. There is no distension.      Palpations: Abdomen is soft.   Skin:     General: Skin is warm.   Neurological:      General: No focal deficit present.      Mental Status: She is alert and oriented to person, place, and time.   Psychiatric:         Mood and Affect: Mood and affect normal.         Behavior: Behavior normal.         Cognition and Memory: Cognition and memory normal.         RECORDS REVIEW:   I have reviewed records in EMR    ASSESSMENT     Diagnoses and all orders for this visit:    1. Acute cough    2. PND (post-nasal drip)    3. Sore throat    4. Hoarseness    5. Impaired functional mobility and activity tolerance        PLAN    Cough/congestion/sore throat  -Mucinex 600 mg p.o. twice daily x7 days.  Sore throat spray every 4 hours as needed.  Will follow-up and continue to monitor.    Patient and nursing encouraged to keep me informed of any acute changes, lack of improvement, or any new concerning symptoms.     Continue supportive care for all ADLs    Will follow-up and continue to monitor.    [x]  Discussed Patient in detail with nursing/staff, addressed all needs today.     [x]  Plan of Care Reviewed   [x]   PT/OT Reviewed   []  Order Changes  [x]  Discharge Plans Reviewed  [x]  Advance Directive on file with Nursing Home.   [x]  POA on file with Nursing Home.   [x]  Code Status listed: []  Full Code   []  DNR        I spent 30 minutes caring for Millicent on this date of service. This time includes time spent by me in the following activities:preparing for the visit, reviewing tests, obtaining and/or reviewing a separately obtained history, performing a medically appropriate examination and/or evaluation , counseling and educating the patient/family/caregiver, ordering medications, tests, or procedures, referring and communicating with other health care professionals , documenting information in the medical record, independently interpreting results and communicating that information with the patient/family/caregiver, and care coordination     Sidra Stevens, APRN.

## 2023-11-17 ENCOUNTER — NURSING HOME (OUTPATIENT)
Dept: FAMILY MEDICINE CLINIC | Facility: CLINIC | Age: 63
End: 2023-11-17
Payer: MEDICARE

## 2023-11-17 VITALS
RESPIRATION RATE: 18 BRPM | OXYGEN SATURATION: 96 % | SYSTOLIC BLOOD PRESSURE: 137 MMHG | HEART RATE: 89 BPM | TEMPERATURE: 97.3 F | DIASTOLIC BLOOD PRESSURE: 79 MMHG

## 2023-11-17 DIAGNOSIS — U07.1 COVID-19: Primary | ICD-10-CM

## 2023-11-17 DIAGNOSIS — J02.9 SORE THROAT: ICD-10-CM

## 2023-11-17 DIAGNOSIS — R05.8 RESPIRATORY TRACT CONGESTION WITH COUGH: ICD-10-CM

## 2023-11-17 DIAGNOSIS — Z99.2 END STAGE RENAL DISEASE ON DIALYSIS: ICD-10-CM

## 2023-11-17 DIAGNOSIS — N18.6 END STAGE RENAL DISEASE ON DIALYSIS: ICD-10-CM

## 2023-11-17 PROBLEM — L03.115 CELLULITIS OF BOTH LOWER EXTREMITIES: Status: RESOLVED | Noted: 2019-01-10 | Resolved: 2023-11-17

## 2023-11-17 PROBLEM — I89.0 LYMPHEDEMA OF BOTH LOWER EXTREMITIES: Status: RESOLVED | Noted: 2019-07-17 | Resolved: 2023-11-17

## 2023-11-17 PROBLEM — L03.116 CELLULITIS OF BOTH LOWER EXTREMITIES: Status: RESOLVED | Noted: 2019-01-10 | Resolved: 2023-11-17

## 2023-11-17 PROBLEM — L03.119 CELLULITIS OF LOWER EXTREMITY: Status: RESOLVED | Noted: 2018-10-02 | Resolved: 2023-11-17

## 2023-11-17 NOTE — PROGRESS NOTES
Nursing Home Follow Up Note      Praful Archuleta DO []   SNE[facility] Salmon [x]  [address] [place] [address]  Phone: [phone]  Fax: [phone] Beto Aguayo MD []    Rafiq Baron DO []   [address] [place] [address]  Phone: [phone]  Fax: [phone]     PATIENT NAME: Millicent Mckeon                                                                          YOB: 1960           DATE OF SERVICE: 11/14/2023  FACILITY:  [x][facility]   [] [facility]   [] [facility]   [] [facility]   [] Other ______________________________________________________________________      CHIEF COMPLAINT:    Complaints of coughing, hoarseness and not feeling well since yesterday.      HISTORY OF PRESENT ILLNESS:     Complaints today of coughing, hoarseness and not feeling well since yesterday.  She reports that she just feels its a cold or allergies.  She reports that she does feel better today than she did yesterday.  Most concerns just sore throat and cough related to postnasal drainage.  Moving about her room today in her wheelchair with no signs and symptoms of distress.  She does do dialysis on Monday Wednesday and Friday.    PAST MEDICAL & SURGICAL HISTORY:   Past Medical History:   Diagnosis Date    A-fib     Anemia 10/02/2018    Diabetes mellitus     Disease of thyroid gland     GERD (gastroesophageal reflux disease)     Gout     History of transfusion     Hypertension     Impaired functional mobility, balance, gait, and endurance       Past Surgical History:   Procedure Laterality Date    ENDOSCOPY N/A 5/2/2022    Procedure: ESOPHAGOGASTRODUODENOSCOPY WITH BIOPSY;  Surgeon: Jacob Chance MD;  Location: [facility] ENDOSCOPY;  Service: Gastroenterology;  Laterality: N/A;    EYE SURGERY      INCISION AND DRAINAGE LEG Right 1/14/2019    Procedure: Right heel incision and drainage with graft application;  Surgeon: Ar Rueda DPM;  Location: [facility] OR;  Service: Podiatry     INSERTION HEMODIALYSIS CATHETER N/A 4/5/2022    Procedure: HEMODIALYSIS CATHETER INSERTION;  Surgeon: Jean Carlos Guadalupe MD;  Location: [facility];  Service: General;  Laterality: N/A;    LEG SURGERY           MEDICATIONS:  I have reviewed and reconciled the patients medication list in the patients chart at the skilled nursing facility today.      ALLERGIES:    Allergies   Allergen Reactions    Metformin And Related Anaphylaxis     HA, diarrhea, throat swelling    Metformin GI Intolerance         SOCIAL HISTORY:    Social History     Socioeconomic History    Marital status: Single   Tobacco Use    Smoking status: Never    Smokeless tobacco: Never   Vaping Use    Vaping Use: Never used   Substance and Sexual Activity    Alcohol use: No    Drug use: No    Sexual activity: Defer       FAMILY HISTORY:    Family History   Problem Relation Age of Onset    Asthma Mother     Hypertension Father     Stroke Father        REVIEW OF SYSTEMS:    Review of Systems   Constitutional:  Negative for activity change, appetite change, chills, diaphoresis, fatigue and fever.   HENT:  Positive for postnasal drip, sore throat and voice change. Negative for congestion, ear pain and nosebleeds.    Eyes:  Negative for discharge, redness, itching and visual disturbance.   Respiratory:  Positive for cough. Negative for chest tightness, shortness of breath and wheezing.    Cardiovascular:  Negative for chest pain, palpitations and leg swelling.   Gastrointestinal:  Negative for abdominal distention, abdominal pain, blood in stool, constipation, diarrhea, nausea, vomiting and indigestion.   Genitourinary:  Negative for dysuria, flank pain, frequency, hematuria and urinary incontinence.        Dialysis   Musculoskeletal:  Negative for arthralgias, gait problem and myalgias.   Skin:  Negative for color change and rash.   Allergic/Immunologic: Positive for environmental allergies.   Neurological:  Negative for dizziness, seizures, speech difficulty,  weakness, headache, memory problem and confusion.   Psychiatric/Behavioral:  Negative for behavioral problems, dysphoric mood, hallucinations, sleep disturbance and depressed mood. The patient is not nervous/anxious.          PHYSICAL EXAMINATION:   VITAL SIGNS:   Vitals:    11/14/23 1534   BP: 147/85   Pulse: 109   Resp: 18   Temp: 97.7 °F (36.5 °C)   SpO2: 96%   Weight: 110 kg (243 lb)        Physical Exam  Vitals reviewed.   Constitutional:       Appearance: Normal appearance.   HENT:      Mouth/Throat:      Comments: PND  Eyes:      Conjunctiva/sclera: Conjunctivae normal.   Cardiovascular:      Rate and Rhythm: Normal rate and regular rhythm.   Pulmonary:      Effort: Pulmonary effort is normal.      Breath sounds: Normal breath sounds.   Abdominal:      General: Bowel sounds are normal. There is no distension.      Palpations: Abdomen is soft.   Skin:     General: Skin is warm.   Neurological:      General: No focal deficit present.      Mental Status: She is alert and oriented to person, place, and time.   Psychiatric:         Mood and Affect: Mood and affect normal.         Behavior: Behavior normal.         Cognition and Memory: Cognition and memory normal.         RECORDS REVIEW:   I have reviewed records in EMR    ASSESSMENT     Diagnoses and all orders for this visit:    1. Acute cough    2. PND (post-nasal drip)    3. Sore throat    4. Hoarseness    5. Impaired functional mobility and activity tolerance        PLAN    Cough/congestion/sore throat  -Mucinex 600 mg p.o. twice daily x7 days.  Sore throat spray every 4 hours as needed.  Will follow-up and continue to monitor.    Patient and nursing encouraged to keep me informed of any acute changes, lack of improvement, or any new concerning symptoms.     Continue supportive care for all ADLs    Will follow-up and continue to monitor.    [x]  Discussed Patient in detail with nursing/staff, addressed all needs today.     [x]  Plan of Care Reviewed   [x]   PT/OT Reviewed   []  Order Changes  [x]  Discharge Plans Reviewed  [x]  Advance Directive on file with Nursing Home.   [x]  POA on file with Nursing Home.   [x]  Code Status listed: []  Full Code   []  DNR        I spent 30 minutes caring for Millicent on this date of service. This time includes time spent by me in the following activities:preparing for the visit, reviewing tests, obtaining and/or reviewing a separately obtained history, performing a medically appropriate examination and/or evaluation , counseling and educating the patient/family/caregiver, ordering medications, tests, or procedures, referring and communicating with other health care professionals , documenting information in the medical record, independently interpreting results and communicating that information with the patient/family/caregiver, and care coordination     Sidra Stevens, APRN.

## 2023-11-17 NOTE — LETTER
Nursing Home Follow Up Note      Praful Archuleta DO []   SNEHA Salmon [x]  [address] [place] [address]  Phone: [phone]  Fax: [phone] Beto Aguayo MD []    Rafiq Baron DO []   [address] [place] [address]  Phone: [phone]  Fax: [phone]     PATIENT NAME: Millicent Mckeon                                                                          YOB: 1960           DATE OF SERVICE: 11/17/2023  FACILITY:  [x][place]   [] [place]   [] [facility]   [] [facility]   [] Other ______________________________________________________________________      CHIEF COMPLAINT:    Follow-up COVID infection.      HISTORY OF PRESENT ILLNESS:     Patient had sore throat and postnasal drainage on 11/14 so after several other residents in the facility tested positive for COVID she was tested.  She was positive for COVID.  Nursing contacted her PCP, Dr. Baron, and he started her on Paxlovid.  She is on dialysis so her dosing was verified with pharmacist.  Resting in bed during visit today he and reports she is doing well.  She reports all she ever had was the sore throat and postnasal drainage, has not had any other symptoms since.  She is not having any adverse side effects from Paxlovid. Has not had any fever or other abnormal vital signs.     PAST MEDICAL & SURGICAL HISTORY:   Past Medical History:   Diagnosis Date    A-fib     Anemia 10/02/2018    Diabetes mellitus     Disease of thyroid gland     GERD (gastroesophageal reflux disease)     Gout     History of transfusion     Hypertension     Impaired functional mobility, balance, gait, and endurance       Past Surgical History:   Procedure Laterality Date    ENDOSCOPY N/A 5/2/2022    Procedure: ESOPHAGOGASTRODUODENOSCOPY WITH BIOPSY;  Surgeon: Jacob Chance MD;  Location: [facility] ENDOSCOPY;  Service: Gastroenterology;  Laterality: N/A;    EYE SURGERY      INCISION AND DRAINAGE LEG Right 1/14/2019     Procedure: Right heel incision and drainage with graft application;  Surgeon: Ar Rueda DPM;  Location: [facility] OR;  Service: Podiatry    INSERTION HEMODIALYSIS CATHETER N/A 4/5/2022    Procedure: HEMODIALYSIS CATHETER INSERTION;  Surgeon: Jean Carlos Guadalupe MD;  Location: [facility] OR;  Service: General;  Laterality: N/A;    LEG SURGERY           MEDICATIONS:  I have reviewed and reconciled the patients medication list in the patients chart at the skilled nursing facility today.      ALLERGIES:    Allergies   Allergen Reactions    Metformin And Related Anaphylaxis     HA, diarrhea, throat swelling    Metformin GI Intolerance         SOCIAL HISTORY:    Social History     Socioeconomic History    Marital status: Single   Tobacco Use    Smoking status: Never    Smokeless tobacco: Never   Vaping Use    Vaping Use: Never used   Substance and Sexual Activity    Alcohol use: No    Drug use: No    Sexual activity: Defer       FAMILY HISTORY:    Family History   Problem Relation Age of Onset    Asthma Mother     Hypertension Father     Stroke Father        REVIEW OF SYSTEMS:    Review of Systems   Constitutional:  Negative for activity change, appetite change, chills, diaphoresis, fatigue and fever.   HENT:  Positive for postnasal drip and sore throat. Negative for congestion, ear pain, nosebleeds and voice change.    Eyes:  Negative for discharge, redness and itching.   Respiratory:  Positive for cough. Negative for chest tightness, shortness of breath and wheezing.    Cardiovascular:  Negative for chest pain, palpitations and leg swelling.   Gastrointestinal:  Negative for abdominal distention, abdominal pain, blood in stool, constipation, diarrhea, nausea, vomiting and indigestion.   Genitourinary:  Negative for dysuria, flank pain, frequency, hematuria and urinary incontinence.        Dialysis   Musculoskeletal:  Negative for arthralgias, gait problem and myalgias.   Skin:  Negative for color change and rash.    Neurological:  Negative for dizziness, seizures, speech difficulty, weakness, headache, memory problem and confusion.   Psychiatric/Behavioral:  Negative for behavioral problems, dysphoric mood, hallucinations, sleep disturbance and depressed mood. The patient is not nervous/anxious.          PHYSICAL EXAMINATION:   VITAL SIGNS:   Vitals:    11/17/23 1230   BP: 137/79   Pulse: 89   Resp: 18   Temp: 97.3 °F (36.3 °C)   SpO2: 96%        Physical Exam  Vitals reviewed.   Constitutional:       Appearance: Normal appearance.   HENT:      Mouth/Throat:      Comments: PND  Eyes:      Conjunctiva/sclera: Conjunctivae normal.   Cardiovascular:      Rate and Rhythm: Normal rate and regular rhythm.   Pulmonary:      Effort: Pulmonary effort is normal.      Breath sounds: Normal breath sounds.   Abdominal:      General: Bowel sounds are normal. There is no distension.      Palpations: Abdomen is soft.   Skin:     General: Skin is warm.   Neurological:      General: No focal deficit present.      Mental Status: She is alert and oriented to person, place, and time.   Psychiatric:         Mood and Affect: Mood and affect normal.         Behavior: Behavior normal.         Cognition and Memory: Cognition and memory normal.         RECORDS REVIEW:   I have reviewed records in EMR    ASSESSMENT     Diagnoses and all orders for this visit:    1. COVID-19 (Primary)    2. Respiratory tract congestion with cough    3. Sore throat    4. End stage renal disease on dialysis      PLAN    Covid infection/cough/congestion/sore throat  - Continue Paxlovid as directed until completion.  Dose was verified with pharmacy.  Continue Mucinex 600 mg p.o. twice daily and sore throat spray every 4 hours as needed.  Will follow-up and continue to monitor.    Patient and nursing encouraged to keep me informed of any acute changes, lack of improvement, or any new concerning symptoms.     Continue supportive care for all ADLs    Will follow-up and continue  to monitor.    [x]  Discussed Patient in detail with nursing/staff, addressed all needs today.     [x]  Plan of Care Reviewed   [x]  PT/OT Reviewed   []  Order Changes  [x]  Discharge Plans Reviewed  [x]  Advance Directive on file with Nursing Home.   [x]  POA on file with Nursing Home.   [x]  Code Status listed: []  Full Code   []  DNR     “I confirm accuracy of unchanged data/findings which have been carried forward from previous visit, as well as I have updated appropriately those that have changed.”     Sidra Stevens, APRN.

## 2023-11-20 NOTE — PROGRESS NOTES
Nursing Home Progress Note        Rafiq Baron DO   [address] [place] [address] Phone: [phone]  Fax: [phone]     PATIENT NAME: Millicent Mckeon                                                                          YOB: 1960           DATE OF SERVICE: 11/21/2023  FACILITY:  [place]  ______________________________________________________________________     CHIEF COMPLAINT:  Chronic Medical Management      HISTORY OF PRESENT ILLNESS:   Patient was seen for routine compliance visit.  Mood behaviors have been stable.  Patient was recently diagnosed with COVID-19 infection and Paxlovid was started.  As far as her symptoms, she only had 1 day of loss of taste and smell.  She has otherwise been doing fairly well, in fact having an increased appetite more recently.    PAST MEDICAL & SURGICAL HISTORY:   Past Medical History:   Diagnosis Date    A-fib     Anemia 10/02/2018    Diabetes mellitus     Disease of thyroid gland     GERD (gastroesophageal reflux disease)     Gout     History of transfusion     Hypertension     Impaired functional mobility, balance, gait, and endurance       Past Surgical History:   Procedure Laterality Date    ENDOSCOPY N/A 5/2/2022    Procedure: ESOPHAGOGASTRODUODENOSCOPY WITH BIOPSY;  Surgeon: Jacob Chance MD;  Location: [facility] ENDOSCOPY;  Service: Gastroenterology;  Laterality: N/A;    EYE SURGERY      INCISION AND DRAINAGE LEG Right 1/14/2019    Procedure: Right heel incision and drainage with graft application;  Surgeon: Ar Rueda DPM;  Location: [facility] OR;  Service: Podiatry    INSERTION HEMODIALYSIS CATHETER N/A 4/5/2022    Procedure: HEMODIALYSIS CATHETER INSERTION;  Surgeon: Jean Carlos Guadalupe MD;  Location: [facility] OR;  Service: General;  Laterality: N/A;    LEG SURGERY           MEDICATIONS:  I have reviewed and reconciled the patients medication list in the patients chart at the skilled nursing facility today, 11/21/2023.       ALLERGIES:  Allergies   Allergen Reactions    Metformin And Related Anaphylaxis     HA, diarrhea, throat swelling    Metformin GI Intolerance         SOCIAL HISTORY:  Social History     Socioeconomic History    Marital status: Single   Tobacco Use    Smoking status: Never    Smokeless tobacco: Never   Vaping Use    Vaping Use: Never used   Substance and Sexual Activity    Alcohol use: No    Drug use: No    Sexual activity: Defer       FAMILY HISTORY:  Family History   Problem Relation Age of Onset    Asthma Mother     Hypertension Father     Stroke Father        REVIEW OF SYSTEMS:  Review of Systems   Constitutional:  Negative for chills, fatigue and fever.   HENT:  Negative for congestion, ear pain, rhinorrhea, sinus pressure and sore throat.    Eyes:  Negative for visual disturbance.   Respiratory:  Negative for cough, chest tightness, shortness of breath and wheezing.    Cardiovascular:  Negative for chest pain, palpitations and leg swelling.   Gastrointestinal:  Negative for abdominal pain, blood in stool, constipation, diarrhea, nausea and vomiting.   Endocrine: Negative for polydipsia and polyuria.   Genitourinary:  Negative for dysuria and hematuria.   Musculoskeletal:  Negative for arthralgias and back pain.   Skin:  Negative for rash.   Neurological:  Negative for dizziness, light-headedness, numbness and headaches.   Psychiatric/Behavioral:  Negative for dysphoric mood and sleep disturbance. The patient is not nervous/anxious.           PHYSICAL EXAMINATION:     VITAL SIGNS:  /79   Pulse 98   Temp 97.6 °F (36.4 °C)   Resp 18   Wt 108 kg (237 lb 14.4 oz)   SpO2 96%   BMI 38.40 kg/m²     Physical Exam  Vitals and nursing note reviewed.   Constitutional:       Appearance: Normal appearance. She is well-developed. She is obese.   HENT:      Head: Normocephalic and atraumatic.      Nose: Nose normal.      Mouth/Throat:      Mouth: Mucous membranes are moist.      Pharynx: No oropharyngeal  exudate.   Eyes:      General: No scleral icterus.     Conjunctiva/sclera: Conjunctivae normal.      Pupils: Pupils are equal, round, and reactive to light.   Neck:      Thyroid: No thyromegaly.   Cardiovascular:      Rate and Rhythm: Normal rate. Rhythm irregular.      Heart sounds: Normal heart sounds. No murmur heard.     No friction rub. No gallop.   Pulmonary:      Effort: Pulmonary effort is normal. No respiratory distress.      Breath sounds: Normal breath sounds. No wheezing.   Abdominal:      General: Bowel sounds are normal. There is no distension.      Palpations: Abdomen is soft.      Tenderness: There is no abdominal tenderness.   Musculoskeletal:         General: No deformity or signs of injury.      Cervical back: Normal range of motion and neck supple.      Right lower leg: Edema present.      Left lower leg: Edema present.      Comments: Left upper extremity AV fistula    Lymphadenopathy:      Cervical: No cervical adenopathy.   Skin:     General: Skin is warm and dry.      Findings: No rash.   Neurological:      Mental Status: She is alert and oriented to person, place, and time.   Psychiatric:         Mood and Affect: Mood normal.         Behavior: Behavior normal.       RECORDS REVIEW:        ASSESSMENT     Diagnoses and all orders for this visit:    1. COVID-19 virus infection (Primary)    2. Type 2 diabetes mellitus with nephropathy    3. Essential hypertension    4. Gastroesophageal reflux disease with esophagitis and hemorrhage    5. Acquired hypothyroidism    6. ESRD (end stage renal disease)    7. Chronic diastolic congestive heart failure    8. Chronic anemia    9. Bilateral edema of lower extremity        PLAN  COVID-19 infection  -1 day of significant symptoms.  Continues to tolerate Paxlovid well, renally dosed. Statin on hold while on Paxlovid.   -Patient to complete 10 days of quarantine in facility.     Anxiety  -Has been stable. in the future, we can consider reducing  Lexapro.    Type 2 diabetes mellitus with hypoglycemia  - Ozempic at current doses is working very well for the patient's glucose control.  -Continue Levemir 10 units daily with sliding scale.  Fortunately, she has not been requiring much sliding scale.    End-stage renal disease  - Continue hemodialysis as scheduled 3 times a week.  -CBC, CMP, A1c every 3 months.    Vaginal Bleeding  - CT imaging 5/7/22 was suggesting calcified uterine fibroids.  No signs of recurrence at this time.  We will continue to monitor.     Diarrhea  -  C. difficile was negative, appears to be secondary to dialysis.  Continue supportive care.     Bilateral Lower Extremity Edema  -Fair control with elevation and compression     Acute blood loss anemia secondary to esophageal ulcers  -Cont iron supplements.  CBC being monitored by nephrology regularly.      GERD  - Continue PPI.     Diabetic gastroparesis  - Well controlled with Reglan.     Essential hypertension  - Stable controlled with current medication regimen.     Atrial fibrillation  - Stable control of rhythm.  Continue cardiac medications.  - Continue Eliquis.     Hypothyroidism  - TSH is at goal.  Continue current dose of Synthroid.        [x]  Discussed Patient in detail with nursing/staff, addressed all needs today.     [x]  Plan of Care Reviewed   []  PT/OT Reviewed   [x]  Order Changes  []  Discharge Plans Reviewed  [x]  Advance Directive on file with Nursing Home.   [x]  POA on file with Nursing Home.    [x]  Code Status listed and reviewed.     I confirm accuracy of unchanged data/findings including physical exam and plan which have been carried forward from previous visit, as well as I have updated appropriately those that have changed        Rafiq Baron DO.  11/26/2023

## 2023-11-20 NOTE — PROGRESS NOTES
Nursing Home Follow Up Note      Praful Archuleta DO []   SNEHA Salmon [x]  [address] [place] [address]  Phone: [phone]  Fax: [phone] Beto Aguayo MD []    Rafiq Baron DO []   [address] [place] [address]  Phone: [phone]  Fax: [phone]     PATIENT NAME: Millicent Mckeon                                                                          YOB: 1960           DATE OF SERVICE: 11/17/2023  FACILITY:  [x][place]   [] [place]   [] [facility]   [] [facility]   [] Other ______________________________________________________________________      CHIEF COMPLAINT:    Follow-up COVID infection.      HISTORY OF PRESENT ILLNESS:     Patient had sore throat and postnasal drainage on 11/14 so after several other residents in the facility tested positive for COVID she was tested.  She was positive for COVID.  Nursing contacted her PCP, Dr. Baron, and he started her on Paxlovid.  She is on dialysis so her dosing was verified with pharmacist.  Resting in bed during visit today he and reports she is doing well.  She reports all she ever had was the sore throat and postnasal drainage, has not had any other symptoms since.  She is not having any adverse side effects from Paxlovid. Has not had any fever or other abnormal vital signs.     PAST MEDICAL & SURGICAL HISTORY:   Past Medical History:   Diagnosis Date    A-fib     Anemia 10/02/2018    Diabetes mellitus     Disease of thyroid gland     GERD (gastroesophageal reflux disease)     Gout     History of transfusion     Hypertension     Impaired functional mobility, balance, gait, and endurance       Past Surgical History:   Procedure Laterality Date    ENDOSCOPY N/A 5/2/2022    Procedure: ESOPHAGOGASTRODUODENOSCOPY WITH BIOPSY;  Surgeon: Jacob Chance MD;  Location: [facility] ENDOSCOPY;  Service: Gastroenterology;  Laterality: N/A;    EYE SURGERY      INCISION AND DRAINAGE LEG Right 1/14/2019     Procedure: Right heel incision and drainage with graft application;  Surgeon: Ar Rueda DPM;  Location: [facility] OR;  Service: Podiatry    INSERTION HEMODIALYSIS CATHETER N/A 4/5/2022    Procedure: HEMODIALYSIS CATHETER INSERTION;  Surgeon: Jean Carlos Guadalupe MD;  Location: [facility] OR;  Service: General;  Laterality: N/A;    LEG SURGERY           MEDICATIONS:  I have reviewed and reconciled the patients medication list in the patients chart at the skilled nursing facility today.      ALLERGIES:    Allergies   Allergen Reactions    Metformin And Related Anaphylaxis     HA, diarrhea, throat swelling    Metformin GI Intolerance         SOCIAL HISTORY:    Social History     Socioeconomic History    Marital status: Single   Tobacco Use    Smoking status: Never    Smokeless tobacco: Never   Vaping Use    Vaping Use: Never used   Substance and Sexual Activity    Alcohol use: No    Drug use: No    Sexual activity: Defer       FAMILY HISTORY:    Family History   Problem Relation Age of Onset    Asthma Mother     Hypertension Father     Stroke Father        REVIEW OF SYSTEMS:    Review of Systems   Constitutional:  Negative for activity change, appetite change, chills, diaphoresis, fatigue and fever.   HENT:  Positive for postnasal drip and sore throat. Negative for congestion, ear pain, nosebleeds and voice change.    Eyes:  Negative for discharge, redness and itching.   Respiratory:  Positive for cough. Negative for chest tightness, shortness of breath and wheezing.    Cardiovascular:  Negative for chest pain, palpitations and leg swelling.   Gastrointestinal:  Negative for abdominal distention, abdominal pain, blood in stool, constipation, diarrhea, nausea, vomiting and indigestion.   Genitourinary:  Negative for dysuria, flank pain, frequency, hematuria and urinary incontinence.        Dialysis   Musculoskeletal:  Negative for arthralgias, gait problem and myalgias.   Skin:  Negative for color change and rash.    Neurological:  Negative for dizziness, seizures, speech difficulty, weakness, headache, memory problem and confusion.   Psychiatric/Behavioral:  Negative for behavioral problems, dysphoric mood, hallucinations, sleep disturbance and depressed mood. The patient is not nervous/anxious.          PHYSICAL EXAMINATION:   VITAL SIGNS:   Vitals:    11/17/23 1230   BP: 137/79   Pulse: 89   Resp: 18   Temp: 97.3 °F (36.3 °C)   SpO2: 96%        Physical Exam  Vitals reviewed.   Constitutional:       Appearance: Normal appearance.   HENT:      Mouth/Throat:      Comments: PND  Eyes:      Conjunctiva/sclera: Conjunctivae normal.   Cardiovascular:      Rate and Rhythm: Normal rate and regular rhythm.   Pulmonary:      Effort: Pulmonary effort is normal.      Breath sounds: Normal breath sounds.   Abdominal:      General: Bowel sounds are normal. There is no distension.      Palpations: Abdomen is soft.   Skin:     General: Skin is warm.   Neurological:      General: No focal deficit present.      Mental Status: She is alert and oriented to person, place, and time.   Psychiatric:         Mood and Affect: Mood and affect normal.         Behavior: Behavior normal.         Cognition and Memory: Cognition and memory normal.         RECORDS REVIEW:   I have reviewed records in EMR    ASSESSMENT     Diagnoses and all orders for this visit:    1. COVID-19 (Primary)    2. Respiratory tract congestion with cough    3. Sore throat    4. End stage renal disease on dialysis      PLAN    Covid infection/cough/congestion/sore throat  - Continue Paxlovid as directed until completion.  Dose was verified with pharmacy.  Continue Mucinex 600 mg p.o. twice daily and sore throat spray every 4 hours as needed.  Will follow-up and continue to monitor.    Patient and nursing encouraged to keep me informed of any acute changes, lack of improvement, or any new concerning symptoms.     Continue supportive care for all ADLs    Will follow-up and continue  to monitor.    [x]  Discussed Patient in detail with nursing/staff, addressed all needs today.     [x]  Plan of Care Reviewed   [x]  PT/OT Reviewed   []  Order Changes  [x]  Discharge Plans Reviewed  [x]  Advance Directive on file with Nursing Home.   [x]  POA on file with Nursing Home.   [x]  Code Status listed: []  Full Code   []  DNR     “I confirm accuracy of unchanged data/findings which have been carried forward from previous visit, as well as I have updated appropriately those that have changed.”     Sidra Stevens, APRN.

## 2023-11-21 ENCOUNTER — NURSING HOME (OUTPATIENT)
Dept: INTERNAL MEDICINE | Facility: CLINIC | Age: 63
End: 2023-11-21
Payer: MEDICARE

## 2023-11-21 VITALS
RESPIRATION RATE: 18 BRPM | BODY MASS INDEX: 38.4 KG/M2 | HEART RATE: 98 BPM | TEMPERATURE: 97.6 F | OXYGEN SATURATION: 96 % | WEIGHT: 237.9 LBS | SYSTOLIC BLOOD PRESSURE: 139 MMHG | DIASTOLIC BLOOD PRESSURE: 79 MMHG

## 2023-11-21 DIAGNOSIS — I10 ESSENTIAL HYPERTENSION: ICD-10-CM

## 2023-11-21 DIAGNOSIS — I50.32 CHRONIC DIASTOLIC CONGESTIVE HEART FAILURE: ICD-10-CM

## 2023-11-21 DIAGNOSIS — D64.9 CHRONIC ANEMIA: ICD-10-CM

## 2023-11-21 DIAGNOSIS — E11.21 TYPE 2 DIABETES MELLITUS WITH NEPHROPATHY: ICD-10-CM

## 2023-11-21 DIAGNOSIS — E03.9 ACQUIRED HYPOTHYROIDISM: ICD-10-CM

## 2023-11-21 DIAGNOSIS — R60.0 BILATERAL EDEMA OF LOWER EXTREMITY: ICD-10-CM

## 2023-11-21 DIAGNOSIS — U07.1 COVID-19 VIRUS INFECTION: Primary | ICD-10-CM

## 2023-11-21 DIAGNOSIS — N18.6 ESRD (END STAGE RENAL DISEASE): ICD-10-CM

## 2023-11-21 DIAGNOSIS — K21.01 GASTROESOPHAGEAL REFLUX DISEASE WITH ESOPHAGITIS AND HEMORRHAGE: ICD-10-CM

## 2023-11-21 NOTE — LETTER
Nursing Home Progress Note        Rafiq Baron DO   [address] [place] [address] Phone: [phone]  Fax: [phone]     PATIENT NAME: Millicent Mckeon                                                                          YOB: 1960           DATE OF SERVICE: 11/21/2023  FACILITY:  [facility]  ______________________________________________________________________     CHIEF COMPLAINT:  Chronic Medical Management      HISTORY OF PRESENT ILLNESS:   Patient was seen for routine compliance visit.  Mood behaviors have been stable.  Patient was recently diagnosed with COVID-19 infection and Paxlovid was started.  As far as her symptoms, she only had 1 day of loss of taste and smell.  She has otherwise been doing fairly well, in fact having an increased appetite more recently.    PAST MEDICAL & SURGICAL HISTORY:   Past Medical History:   Diagnosis Date    A-fib     Anemia 10/02/2018    Diabetes mellitus     Disease of thyroid gland     GERD (gastroesophageal reflux disease)     Gout     History of transfusion     Hypertension     Impaired functional mobility, balance, gait, and endurance       Past Surgical History:   Procedure Laterality Date    ENDOSCOPY N/A 5/2/2022    Procedure: ESOPHAGOGASTRODUODENOSCOPY WITH BIOPSY;  Surgeon: Jacob Chance MD;  Location: [facility] ENDOSCOPY;  Service: Gastroenterology;  Laterality: N/A;    EYE SURGERY      INCISION AND DRAINAGE LEG Right 1/14/2019    Procedure: Right heel incision and drainage with graft application;  Surgeon: Ar Rueda DPM;  Location: [facility] OR;  Service: Podiatry    INSERTION HEMODIALYSIS CATHETER N/A 4/5/2022    Procedure: HEMODIALYSIS CATHETER INSERTION;  Surgeon: Jean Carlos Guadalupe MD;  Location: [facility] OR;  Service: General;  Laterality: N/A;    LEG SURGERY           MEDICATIONS:  I have reviewed and reconciled the patients medication list in the patients chart at the skilled nursing facility today, 11/21/2023.       ALLERGIES:  Allergies   Allergen Reactions    Metformin And Related Anaphylaxis     HA, diarrhea, throat swelling    Metformin GI Intolerance         SOCIAL HISTORY:  Social History     Socioeconomic History    Marital status: Single   Tobacco Use    Smoking status: Never    Smokeless tobacco: Never   Vaping Use    Vaping Use: Never used   Substance and Sexual Activity    Alcohol use: No    Drug use: No    Sexual activity: Defer       FAMILY HISTORY:  Family History   Problem Relation Age of Onset    Asthma Mother     Hypertension Father     Stroke Father        REVIEW OF SYSTEMS:  Review of Systems   Constitutional:  Negative for chills, fatigue and fever.   HENT:  Negative for congestion, ear pain, rhinorrhea, sinus pressure and sore throat.    Eyes:  Negative for visual disturbance.   Respiratory:  Negative for cough, chest tightness, shortness of breath and wheezing.    Cardiovascular:  Negative for chest pain, palpitations and leg swelling.   Gastrointestinal:  Negative for abdominal pain, blood in stool, constipation, diarrhea, nausea and vomiting.   Endocrine: Negative for polydipsia and polyuria.   Genitourinary:  Negative for dysuria and hematuria.   Musculoskeletal:  Negative for arthralgias and back pain.   Skin:  Negative for rash.   Neurological:  Negative for dizziness, light-headedness, numbness and headaches.   Psychiatric/Behavioral:  Negative for dysphoric mood and sleep disturbance. The patient is not nervous/anxious.           PHYSICAL EXAMINATION:     VITAL SIGNS:  /79   Pulse 98   Temp 97.6 °F (36.4 °C)   Resp 18   Wt 108 kg (237 lb 14.4 oz)   SpO2 96%   BMI 38.40 kg/m²     Physical Exam  Vitals and nursing note reviewed.   Constitutional:       Appearance: Normal appearance. She is well-developed. She is obese.   HENT:      Head: Normocephalic and atraumatic.      Nose: Nose normal.      Mouth/Throat:      Mouth: Mucous membranes are moist.      Pharynx: No oropharyngeal  exudate.   Eyes:      General: No scleral icterus.     Conjunctiva/sclera: Conjunctivae normal.      Pupils: Pupils are equal, round, and reactive to light.   Neck:      Thyroid: No thyromegaly.   Cardiovascular:      Rate and Rhythm: Normal rate. Rhythm irregular.      Heart sounds: Normal heart sounds. No murmur heard.     No friction rub. No gallop.   Pulmonary:      Effort: Pulmonary effort is normal. No respiratory distress.      Breath sounds: Normal breath sounds. No wheezing.   Abdominal:      General: Bowel sounds are normal. There is no distension.      Palpations: Abdomen is soft.      Tenderness: There is no abdominal tenderness.   Musculoskeletal:         General: No deformity or signs of injury.      Cervical back: Normal range of motion and neck supple.      Right lower leg: Edema present.      Left lower leg: Edema present.      Comments: Left upper extremity AV fistula    Lymphadenopathy:      Cervical: No cervical adenopathy.   Skin:     General: Skin is warm and dry.      Findings: No rash.   Neurological:      Mental Status: She is alert and oriented to person, place, and time.   Psychiatric:         Mood and Affect: Mood normal.         Behavior: Behavior normal.       RECORDS REVIEW:        ASSESSMENT     Diagnoses and all orders for this visit:    1. COVID-19 virus infection (Primary)    2. Type 2 diabetes mellitus with nephropathy    3. Essential hypertension    4. Gastroesophageal reflux disease with esophagitis and hemorrhage    5. Acquired hypothyroidism    6. ESRD (end stage renal disease)    7. Chronic diastolic congestive heart failure    8. Chronic anemia    9. Bilateral edema of lower extremity        PLAN  COVID-19 infection  -1 day of significant symptoms.  Continues to tolerate Paxlovid well, renally dosed. Statin on hold while on Paxlovid.   -Patient to complete 10 days of quarantine in facility.     Anxiety  -Has been stable. in the future, we can consider reducing  Lexapro.    Type 2 diabetes mellitus with hypoglycemia  - Ozempic at current doses is working very well for the patient's glucose control.  -Continue Levemir 10 units daily with sliding scale.  Fortunately, she has not been requiring much sliding scale.    End-stage renal disease  - Continue hemodialysis as scheduled 3 times a week.  -CBC, CMP, A1c every 3 months.    Vaginal Bleeding  - CT imaging 5/7/22 was suggesting calcified uterine fibroids.  No signs of recurrence at this time.  We will continue to monitor.     Diarrhea  -  C. difficile was negative, appears to be secondary to dialysis.  Continue supportive care.     Bilateral Lower Extremity Edema  -Fair control with elevation and compression     Acute blood loss anemia secondary to esophageal ulcers  -Cont iron supplements.  CBC being monitored by nephrology regularly.      GERD  - Continue PPI.     Diabetic gastroparesis  - Well controlled with Reglan.     Essential hypertension  - Stable controlled with current medication regimen.     Atrial fibrillation  - Stable control of rhythm.  Continue cardiac medications.  - Continue Eliquis.     Hypothyroidism  - TSH is at goal.  Continue current dose of Synthroid.        [x]  Discussed Patient in detail with nursing/staff, addressed all needs today.     [x]  Plan of Care Reviewed   []  PT/OT Reviewed   [x]  Order Changes  []  Discharge Plans Reviewed  [x]  Advance Directive on file with Nursing Home.   [x]  POA on file with Nursing Home.    [x]  Code Status listed and reviewed.     I confirm accuracy of unchanged data/findings including physical exam and plan which have been carried forward from previous visit, as well as I have updated appropriately those that have changed        Rafiq Baron DO.  11/26/2023

## 2024-01-09 ENCOUNTER — NURSING HOME (OUTPATIENT)
Dept: INTERNAL MEDICINE | Facility: CLINIC | Age: 64
End: 2024-01-09
Payer: MEDICARE

## 2024-01-09 VITALS
HEART RATE: 98 BPM | OXYGEN SATURATION: 96 % | RESPIRATION RATE: 18 BRPM | SYSTOLIC BLOOD PRESSURE: 145 MMHG | DIASTOLIC BLOOD PRESSURE: 83 MMHG | WEIGHT: 236 LBS | TEMPERATURE: 97.3 F | BODY MASS INDEX: 38.09 KG/M2

## 2024-01-09 DIAGNOSIS — K21.01 GASTROESOPHAGEAL REFLUX DISEASE WITH ESOPHAGITIS AND HEMORRHAGE: ICD-10-CM

## 2024-01-09 DIAGNOSIS — I50.32 CHRONIC DIASTOLIC CONGESTIVE HEART FAILURE: ICD-10-CM

## 2024-01-09 DIAGNOSIS — I10 ESSENTIAL HYPERTENSION: ICD-10-CM

## 2024-01-09 DIAGNOSIS — D64.9 CHRONIC ANEMIA: ICD-10-CM

## 2024-01-09 DIAGNOSIS — N18.6 ESRD (END STAGE RENAL DISEASE): ICD-10-CM

## 2024-01-09 DIAGNOSIS — E11.21 TYPE 2 DIABETES MELLITUS WITH NEPHROPATHY: Primary | ICD-10-CM

## 2024-01-09 DIAGNOSIS — E03.9 ACQUIRED HYPOTHYROIDISM: ICD-10-CM

## 2024-01-09 DIAGNOSIS — R60.0 BILATERAL EDEMA OF LOWER EXTREMITY: ICD-10-CM

## 2024-01-09 PROCEDURE — 99308 SBSQ NF CARE LOW MDM 20: CPT | Performed by: INTERNAL MEDICINE

## 2024-01-09 NOTE — LETTER
Nursing Home Progress Note        Rafiq Baron DO   [address] [place] [address] Phone: [phone]  Fax: [phone]     PATIENT NAME: Millicent Mckeon                                                                          YOB: 1960           DATE OF SERVICE: 01/09/2024  FACILITY:  [facility]  ______________________________________________________________________     CHIEF COMPLAINT:  Chronic Medical Management      HISTORY OF PRESENT ILLNESS:   Patient was resting comfortably in her bed with no specific complaints or concerns.  She was content and had just returned from an appointment outside of the facility.  She has been tolerating dialysis well and appears to have better control of weight over the last few months.  She is tolerating her current doses of Ozempic.  Glucose levels have been in reasonable control over the last month.    PAST MEDICAL & SURGICAL HISTORY:   Past Medical History:   Diagnosis Date    A-fib     Anemia 10/02/2018    Diabetes mellitus     Disease of thyroid gland     GERD (gastroesophageal reflux disease)     Gout     History of transfusion     Hypertension     Impaired functional mobility, balance, gait, and endurance       Past Surgical History:   Procedure Laterality Date    ENDOSCOPY N/A 5/2/2022    Procedure: ESOPHAGOGASTRODUODENOSCOPY WITH BIOPSY;  Surgeon: Jacob Chance MD;  Location: [facility] ENDOSCOPY;  Service: Gastroenterology;  Laterality: N/A;    EYE SURGERY      INCISION AND DRAINAGE LEG Right 1/14/2019    Procedure: Right heel incision and drainage with graft application;  Surgeon: Ar Rueda DPM;  Location: [facility] OR;  Service: Podiatry    INSERTION HEMODIALYSIS CATHETER N/A 4/5/2022    Procedure: HEMODIALYSIS CATHETER INSERTION;  Surgeon: Jean Carlos Guadalupe MD;  Location: [facility] OR;  Service: General;  Laterality: N/A;    LEG SURGERY           MEDICATIONS:  I have reviewed and reconciled the patients medication list in the  patients chart at the skilled nursing facility today, 01/09/2024.      ALLERGIES:  Allergies   Allergen Reactions    Metformin And Related Anaphylaxis     HA, diarrhea, throat swelling    Metformin GI Intolerance         SOCIAL HISTORY:  Social History     Socioeconomic History    Marital status: Single   Tobacco Use    Smoking status: Never    Smokeless tobacco: Never   Vaping Use    Vaping Use: Never used   Substance and Sexual Activity    Alcohol use: No    Drug use: No    Sexual activity: Defer       FAMILY HISTORY:  Family History   Problem Relation Age of Onset    Asthma Mother     Hypertension Father     Stroke Father        REVIEW OF SYSTEMS:  Review of Systems   Constitutional:  Negative for chills, fatigue and fever.   HENT:  Negative for congestion, ear pain, rhinorrhea, sinus pressure and sore throat.    Eyes:  Negative for visual disturbance.   Respiratory:  Negative for cough, chest tightness, shortness of breath and wheezing.    Cardiovascular:  Negative for chest pain, palpitations and leg swelling.   Gastrointestinal:  Negative for abdominal pain, blood in stool, constipation, diarrhea, nausea and vomiting.   Endocrine: Negative for polydipsia and polyuria.   Genitourinary:  Negative for dysuria and hematuria.   Musculoskeletal:  Negative for arthralgias and back pain.   Skin:  Negative for rash.   Neurological:  Negative for dizziness, light-headedness, numbness and headaches.   Psychiatric/Behavioral:  Negative for dysphoric mood and sleep disturbance. The patient is not nervous/anxious.           PHYSICAL EXAMINATION:     VITAL SIGNS:  /83   Pulse 98   Temp 97.3 °F (36.3 °C)   Resp 18   Wt 107 kg (236 lb)   SpO2 96%   BMI 38.09 kg/m²     Physical Exam  Vitals and nursing note reviewed.   Constitutional:       Appearance: Normal appearance. She is well-developed. She is obese.   HENT:      Head: Normocephalic and atraumatic.      Nose: Nose normal.      Mouth/Throat:      Mouth: Mucous  membranes are moist.      Pharynx: No oropharyngeal exudate.   Eyes:      General: No scleral icterus.     Conjunctiva/sclera: Conjunctivae normal.      Pupils: Pupils are equal, round, and reactive to light.   Neck:      Thyroid: No thyromegaly.   Cardiovascular:      Rate and Rhythm: Normal rate. Rhythm irregular.      Heart sounds: Normal heart sounds. No murmur heard.     No friction rub. No gallop.   Pulmonary:      Effort: Pulmonary effort is normal. No respiratory distress.      Breath sounds: Normal breath sounds. No wheezing.   Abdominal:      General: Bowel sounds are normal. There is no distension.      Palpations: Abdomen is soft.      Tenderness: There is no abdominal tenderness.   Musculoskeletal:         General: No deformity or signs of injury.      Cervical back: Normal range of motion and neck supple.      Right lower leg: Edema present.      Left lower leg: Edema present.      Comments: Left upper extremity AV fistula    Lymphadenopathy:      Cervical: No cervical adenopathy.   Skin:     General: Skin is warm and dry.      Findings: No rash.   Neurological:      Mental Status: She is alert and oriented to person, place, and time.   Psychiatric:         Mood and Affect: Mood normal.         Behavior: Behavior normal.       RECORDS REVIEW:    Labs 11/16/23 stable labs  Cr 3.9    ASSESSMENT     Diagnoses and all orders for this visit:    1. Type 2 diabetes mellitus with nephropathy (Primary)    2. Essential hypertension    3. Gastroesophageal reflux disease with esophagitis and hemorrhage    4. Acquired hypothyroidism    5. Bilateral edema of lower extremity    6. Chronic anemia    7. Chronic diastolic congestive heart failure    8. ESRD (end stage renal disease)        PLAN  Anxiety  -Has been stable on Lexapro.     Type 2 diabetes mellitus with hypoglycemia  - Ozempic at current doses is working very well for the patient's glucose control.  -Continue Levemir 10 units daily with sliding scale.   Fortunately, she has not been requiring much sliding scale.    End-stage renal disease  - Continue hemodialysis as scheduled 3 times a week.  -CBC, CMP, A1c every 3 months.    Vaginal Bleeding  - CT imaging 5/7/22 was suggesting calcified uterine fibroids.  No signs of recurrence at this time.  We will continue to monitor.     Diarrhea  -  C. difficile was negative, appears to be secondary to dialysis.  Continue supportive care.     Bilateral Lower Extremity Edema  -Fair control with elevation and compression     Acute blood loss anemia secondary to esophageal ulcers  -Cont iron supplements.  CBC being monitored by nephrology regularly.      GERD  - Continue PPI.     Diabetic gastroparesis  - Well controlled with Reglan.     Essential hypertension  - Stable controlled with current medication regimen.     Atrial fibrillation  - Stable control of rhythm.  Continue cardiac medications.  - Continue Eliquis.     Hypothyroidism  - TSH is at goal.  Continue current dose of Synthroid.        [x]  Discussed Patient in detail with nursing/staff, addressed all needs today.     [x]  Plan of Care Reviewed   []  PT/OT Reviewed   []  Order Changes  []  Discharge Plans Reviewed  [x]  Advance Directive on file with Nursing Home.   [x]  POA on file with Nursing Home.    [x]  Code Status listed and reviewed.     I confirm accuracy of unchanged data/findings including physical exam and plan which have been carried forward from previous visit, as well as I have updated appropriately those that have changed        Rafiq Baron DO.  1/9/2024

## 2024-01-09 NOTE — PROGRESS NOTES
Nursing Home Progress Note        Rafiq Baron DO   [address] [place] [address] Phone: [phone]  Fax: [phone]     PATIENT NAME: Millicent Mckeon                                                                          YOB: 1960           DATE OF SERVICE: 01/09/2024  FACILITY:  [facility]  ______________________________________________________________________     CHIEF COMPLAINT:  Chronic Medical Management      HISTORY OF PRESENT ILLNESS:   Patient was resting comfortably in her bed with no specific complaints or concerns.  She was content and had just returned from an appointment outside of the facility.  She has been tolerating dialysis well and appears to have better control of weight over the last few months.  She is tolerating her current doses of Ozempic.  Glucose levels have been in reasonable control over the last month.    PAST MEDICAL & SURGICAL HISTORY:   Past Medical History:   Diagnosis Date    A-fib     Anemia 10/02/2018    Diabetes mellitus     Disease of thyroid gland     GERD (gastroesophageal reflux disease)     Gout     History of transfusion     Hypertension     Impaired functional mobility, balance, gait, and endurance       Past Surgical History:   Procedure Laterality Date    ENDOSCOPY N/A 5/2/2022    Procedure: ESOPHAGOGASTRODUODENOSCOPY WITH BIOPSY;  Surgeon: Jacob Chance MD;  Location: [facility] ENDOSCOPY;  Service: Gastroenterology;  Laterality: N/A;    EYE SURGERY      INCISION AND DRAINAGE LEG Right 1/14/2019    Procedure: Right heel incision and drainage with graft application;  Surgeon: Ar Rueda DPM;  Location: [facility] OR;  Service: Podiatry    INSERTION HEMODIALYSIS CATHETER N/A 4/5/2022    Procedure: HEMODIALYSIS CATHETER INSERTION;  Surgeon: Jean Carlos Guadalupe MD;  Location: [facility] OR;  Service: General;  Laterality: N/A;    LEG SURGERY           MEDICATIONS:  I have reviewed and reconciled the patients medication list in the  patients chart at the skilled nursing facility today, 01/09/2024.      ALLERGIES:  Allergies   Allergen Reactions    Metformin And Related Anaphylaxis     HA, diarrhea, throat swelling    Metformin GI Intolerance         SOCIAL HISTORY:  Social History     Socioeconomic History    Marital status: Single   Tobacco Use    Smoking status: Never    Smokeless tobacco: Never   Vaping Use    Vaping Use: Never used   Substance and Sexual Activity    Alcohol use: No    Drug use: No    Sexual activity: Defer       FAMILY HISTORY:  Family History   Problem Relation Age of Onset    Asthma Mother     Hypertension Father     Stroke Father        REVIEW OF SYSTEMS:  Review of Systems   Constitutional:  Negative for chills, fatigue and fever.   HENT:  Negative for congestion, ear pain, rhinorrhea, sinus pressure and sore throat.    Eyes:  Negative for visual disturbance.   Respiratory:  Negative for cough, chest tightness, shortness of breath and wheezing.    Cardiovascular:  Negative for chest pain, palpitations and leg swelling.   Gastrointestinal:  Negative for abdominal pain, blood in stool, constipation, diarrhea, nausea and vomiting.   Endocrine: Negative for polydipsia and polyuria.   Genitourinary:  Negative for dysuria and hematuria.   Musculoskeletal:  Negative for arthralgias and back pain.   Skin:  Negative for rash.   Neurological:  Negative for dizziness, light-headedness, numbness and headaches.   Psychiatric/Behavioral:  Negative for dysphoric mood and sleep disturbance. The patient is not nervous/anxious.           PHYSICAL EXAMINATION:     VITAL SIGNS:  /83   Pulse 98   Temp 97.3 °F (36.3 °C)   Resp 18   Wt 107 kg (236 lb)   SpO2 96%   BMI 38.09 kg/m²     Physical Exam  Vitals and nursing note reviewed.   Constitutional:       Appearance: Normal appearance. She is well-developed. She is obese.   HENT:      Head: Normocephalic and atraumatic.      Nose: Nose normal.      Mouth/Throat:      Mouth: Mucous  membranes are moist.      Pharynx: No oropharyngeal exudate.   Eyes:      General: No scleral icterus.     Conjunctiva/sclera: Conjunctivae normal.      Pupils: Pupils are equal, round, and reactive to light.   Neck:      Thyroid: No thyromegaly.   Cardiovascular:      Rate and Rhythm: Normal rate. Rhythm irregular.      Heart sounds: Normal heart sounds. No murmur heard.     No friction rub. No gallop.   Pulmonary:      Effort: Pulmonary effort is normal. No respiratory distress.      Breath sounds: Normal breath sounds. No wheezing.   Abdominal:      General: Bowel sounds are normal. There is no distension.      Palpations: Abdomen is soft.      Tenderness: There is no abdominal tenderness.   Musculoskeletal:         General: No deformity or signs of injury.      Cervical back: Normal range of motion and neck supple.      Right lower leg: Edema present.      Left lower leg: Edema present.      Comments: Left upper extremity AV fistula    Lymphadenopathy:      Cervical: No cervical adenopathy.   Skin:     General: Skin is warm and dry.      Findings: No rash.   Neurological:      Mental Status: She is alert and oriented to person, place, and time.   Psychiatric:         Mood and Affect: Mood normal.         Behavior: Behavior normal.       RECORDS REVIEW:    Labs 11/16/23 stable labs  Cr 3.9    ASSESSMENT     Diagnoses and all orders for this visit:    1. Type 2 diabetes mellitus with nephropathy (Primary)    2. Essential hypertension    3. Gastroesophageal reflux disease with esophagitis and hemorrhage    4. Acquired hypothyroidism    5. Bilateral edema of lower extremity    6. Chronic anemia    7. Chronic diastolic congestive heart failure    8. ESRD (end stage renal disease)        PLAN  Anxiety  -Has been stable on Lexapro.     Type 2 diabetes mellitus with hypoglycemia  - Ozempic at current doses is working very well for the patient's glucose control.  -Continue Levemir 10 units daily with sliding scale.   Fortunately, she has not been requiring much sliding scale.    End-stage renal disease  - Continue hemodialysis as scheduled 3 times a week.  -CBC, CMP, A1c every 3 months.    Vaginal Bleeding  - CT imaging 5/7/22 was suggesting calcified uterine fibroids.  No signs of recurrence at this time.  We will continue to monitor.     Diarrhea  -  C. difficile was negative, appears to be secondary to dialysis.  Continue supportive care.     Bilateral Lower Extremity Edema  -Fair control with elevation and compression     Acute blood loss anemia secondary to esophageal ulcers  -Cont iron supplements.  CBC being monitored by nephrology regularly.      GERD  - Continue PPI.     Diabetic gastroparesis  - Well controlled with Reglan.     Essential hypertension  - Stable controlled with current medication regimen.     Atrial fibrillation  - Stable control of rhythm.  Continue cardiac medications.  - Continue Eliquis.     Hypothyroidism  - TSH is at goal.  Continue current dose of Synthroid.        [x]  Discussed Patient in detail with nursing/staff, addressed all needs today.     [x]  Plan of Care Reviewed   []  PT/OT Reviewed   []  Order Changes  []  Discharge Plans Reviewed  [x]  Advance Directive on file with Nursing Home.   [x]  POA on file with Nursing Home.    [x]  Code Status listed and reviewed.     I confirm accuracy of unchanged data/findings including physical exam and plan which have been carried forward from previous visit, as well as I have updated appropriately those that have changed        Rafiq Baron DO.  1/9/2024

## 2024-03-04 NOTE — PROGRESS NOTES
Nursing Home Progress Note        Rafiq Baron DO   [address] [place] [address] Phone: [phone]  Fax: [phone]     PATIENT NAME: Millicent Mckeon                                                                          YOB: 1960           DATE OF SERVICE: 03/05/2024  FACILITY:  [facility]  ______________________________________________________________________     CHIEF COMPLAINT:  Chronic Medical Management      HISTORY OF PRESENT ILLNESS:     The patient is a 63-year-old female who presents for follow-up at the nursing facility.  Since her last visit, the patient has been relatively stable with no acute events. Glucose levels have been well controlled.  The patient has no concerns and has been okay over the last month.    She is still doing dialysis and is handling that fine. Her weight has been steady.  Weight has not decreased despite high doses of Ozempic.    PAST MEDICAL & SURGICAL HISTORY:   Past Medical History:   Diagnosis Date    A-fib     Anemia 10/02/2018    Diabetes mellitus     Disease of thyroid gland     GERD (gastroesophageal reflux disease)     Gout     History of transfusion     Hypertension     Impaired functional mobility, balance, gait, and endurance       Past Surgical History:   Procedure Laterality Date    ENDOSCOPY N/A 5/2/2022    Procedure: ESOPHAGOGASTRODUODENOSCOPY WITH BIOPSY;  Surgeon: Jacob Chance MD;  Location: [facility] ENDOSCOPY;  Service: Gastroenterology;  Laterality: N/A;    EYE SURGERY      INCISION AND DRAINAGE LEG Right 1/14/2019    Procedure: Right heel incision and drainage with graft application;  Surgeon: Ar Rueda DPM;  Location: [facility] OR;  Service: Podiatry    INSERTION HEMODIALYSIS CATHETER N/A 4/5/2022    Procedure: HEMODIALYSIS CATHETER INSERTION;  Surgeon: Jean Carlos Guadalupe MD;  Location: [facility] OR;  Service: General;  Laterality: N/A;    LEG SURGERY           MEDICATIONS:  I have reviewed and reconciled the  patients medication list in the patients chart at the [facility] nursing facility today, 03/05/2024.      ALLERGIES:  Allergies   Allergen Reactions    Metformin And Related Anaphylaxis     HA, diarrhea, throat swelling    Metformin GI Intolerance         SOCIAL HISTORY:  Social History     Socioeconomic History    Marital status: Single   Tobacco Use    Smoking status: Never    Smokeless tobacco: Never   Vaping Use    Vaping status: Never Used   Substance and Sexual Activity    Alcohol use: No    Drug use: No    Sexual activity: Defer       FAMILY HISTORY:  Family History   Problem Relation Age of Onset    Asthma Mother     Hypertension Father     Stroke Father        REVIEW OF SYSTEMS:  Review of Systems   Constitutional:  Negative for chills, fatigue and fever.   HENT:  Negative for congestion, ear pain, rhinorrhea, sinus pressure and sore throat.    Eyes:  Negative for visual disturbance.   Respiratory:  Negative for cough, chest tightness, shortness of breath and wheezing.    Cardiovascular:  Negative for chest pain, palpitations and leg swelling.   Gastrointestinal:  Negative for abdominal pain, blood in stool, constipation, diarrhea, nausea and vomiting.   Endocrine: Negative for polydipsia and polyuria.   Genitourinary:  Negative for dysuria and hematuria.   Musculoskeletal:  Negative for arthralgias and back pain.   Skin:  Negative for rash.   Neurological:  Negative for dizziness, light-headedness, numbness and headaches.   Psychiatric/Behavioral:  Negative for dysphoric mood and sleep disturbance. The patient is not nervous/anxious.           PHYSICAL EXAMINATION:     VITAL SIGNS:  /82   Pulse 88   Temp 97.3 °F (36.3 °C)   Resp 18   Wt 105 kg (231 lb 12.8 oz)   SpO2 97%   BMI 37.41 kg/m²     Physical Exam  Vitals and nursing note reviewed.   Constitutional:       Appearance: Normal appearance. She is well-developed. She is obese.   HENT:      Head: Normocephalic and atraumatic.      Nose: Nose  normal.      Mouth/Throat:      Mouth: Mucous membranes are moist.      Pharynx: No oropharyngeal exudate.   Eyes:      General: No scleral icterus.     Conjunctiva/sclera: Conjunctivae normal.      Pupils: Pupils are equal, round, and reactive to light.   Neck:      Thyroid: No thyromegaly.   Cardiovascular:      Rate and Rhythm: Normal rate. Rhythm irregular.      Heart sounds: Normal heart sounds. No murmur heard.     No friction rub. No gallop.   Pulmonary:      Effort: Pulmonary effort is normal. No respiratory distress.      Breath sounds: Normal breath sounds. No wheezing.   Abdominal:      General: Bowel sounds are normal. There is no distension.      Palpations: Abdomen is soft.      Tenderness: There is no abdominal tenderness.   Musculoskeletal:         General: No deformity or signs of injury.      Cervical back: Normal range of motion and neck supple.      Right lower leg: Edema present.      Left lower leg: Edema present.      Comments: Left upper extremity AV fistula    Lymphadenopathy:      Cervical: No cervical adenopathy.   Skin:     General: Skin is warm and dry.      Findings: No rash.   Neurological:      Mental Status: She is alert and oriented to person, place, and time.   Psychiatric:         Mood and Affect: Mood normal.         Behavior: Behavior normal.       RECORDS REVIEW:        ASSESSMENT     Diagnoses and all orders for this visit:    1. Type 2 diabetes mellitus with nephropathy (Primary)    2. Essential hypertension    3. Gastroesophageal reflux disease with esophagitis and hemorrhage    4. Acquired hypothyroidism    5. ESRD (end stage renal disease)    6. Chronic diastolic congestive heart failure    7. Chronic anemia    8. Bilateral edema of lower extremity        PLAN  Anxiety  -Has been stable on Lexapro.     Type 2 diabetes mellitus with hypoglycemia  - Ozempic at current doses is working very well for the patient's glucose control.  -Continue Levemir 10 units daily with sliding  scale.  Fortunately, she has not been requiring much sliding scale.    End-stage renal disease  - Continue hemodialysis as scheduled 3 times a week.  -CBC, CMP, A1c every 3 months.    Vaginal Bleeding  - CT imaging 5/7/22 was suggesting calcified uterine fibroids.  No signs of recurrence at this time.  We will continue to monitor.     Chronic diarrhea  - Stable symptoms at this time.     Bilateral Lower Extremity Edema  -Fair control with elevation and compression     Acute blood loss anemia secondary to esophageal ulcers  -Cont iron supplements.  CBC being monitored by nephrology regularly.      GERD  - Continue PPI.     Diabetic gastroparesis  - Well controlled with Reglan.     Essential hypertension  - Stable controlled with current medication regimen.     Atrial fibrillation  - Stable control of rhythm.  Continue cardiac medications.  - Continue Eliquis.     Hypothyroidism  - Continue current dose of Synthroid.        [x]  Discussed Patient in detail with nursing/staff, addressed all needs today.     [x]  Plan of Care Reviewed   []  PT/OT Reviewed   []  Order Changes  []  Discharge Plans Reviewed  [x]  Advance Directive on file with Nursing Home.   [x]  POA on file with Nursing Home.    [x]  Code Status listed and reviewed.     I confirm accuracy of unchanged data/findings including physical exam and plan which have been carried forward from previous visit, as well as I have updated appropriately those that have changed        Rafiq Baron DO.  3/5/2024        Transcribed from ambient dictation for Rafiq Baron DO by Aazlia Hicks.  03/05/24   09:10 EST    Patient or patient representative verbalized consent to the visit recording.  I have personally performed the services described in this document as transcribed by the above individual, and it is both accurate and complete.

## 2024-03-05 ENCOUNTER — NURSING HOME (OUTPATIENT)
Dept: INTERNAL MEDICINE | Facility: CLINIC | Age: 64
End: 2024-03-05
Payer: MEDICARE

## 2024-03-05 VITALS
HEART RATE: 88 BPM | TEMPERATURE: 97.3 F | SYSTOLIC BLOOD PRESSURE: 132 MMHG | WEIGHT: 231.8 LBS | BODY MASS INDEX: 37.41 KG/M2 | OXYGEN SATURATION: 97 % | RESPIRATION RATE: 18 BRPM | DIASTOLIC BLOOD PRESSURE: 82 MMHG

## 2024-03-05 DIAGNOSIS — K21.01 GASTROESOPHAGEAL REFLUX DISEASE WITH ESOPHAGITIS AND HEMORRHAGE: ICD-10-CM

## 2024-03-05 DIAGNOSIS — E11.21 TYPE 2 DIABETES MELLITUS WITH NEPHROPATHY: Primary | ICD-10-CM

## 2024-03-05 DIAGNOSIS — N18.6 ESRD (END STAGE RENAL DISEASE): ICD-10-CM

## 2024-03-05 DIAGNOSIS — I50.32 CHRONIC DIASTOLIC CONGESTIVE HEART FAILURE: ICD-10-CM

## 2024-03-05 DIAGNOSIS — R60.0 BILATERAL EDEMA OF LOWER EXTREMITY: ICD-10-CM

## 2024-03-05 DIAGNOSIS — E03.9 ACQUIRED HYPOTHYROIDISM: ICD-10-CM

## 2024-03-05 DIAGNOSIS — D64.9 CHRONIC ANEMIA: ICD-10-CM

## 2024-03-05 DIAGNOSIS — I10 ESSENTIAL HYPERTENSION: ICD-10-CM

## 2024-03-05 NOTE — LETTER
Nursing Home Progress Note        Rafiq Baron DO   [address] [place] [address] Phone: [phone]  Fax: [phone]     PATIENT NAME: Millicent Mckeon                                                                          YOB: 1960           DATE OF SERVICE: 03/05/2024  FACILITY:  [facility]  ______________________________________________________________________     CHIEF COMPLAINT:  Chronic Medical Management      HISTORY OF PRESENT ILLNESS:     The patient is a 63-year-old female who presents for follow-up at the nursing facility.  Since her last visit, the patient has been relatively stable with no acute events. Glucose levels have been well controlled.  The patient has no concerns and has been okay over the last month.    She is still doing dialysis and is handling that fine. Her weight has been steady.  Weight has not decreased despite high doses of Ozempic.    PAST MEDICAL & SURGICAL HISTORY:   Past Medical History:   Diagnosis Date    A-fib     Anemia 10/02/2018    Diabetes mellitus     Disease of thyroid gland     GERD (gastroesophageal reflux disease)     Gout     History of transfusion     Hypertension     Impaired functional mobility, balance, gait, and endurance       Past Surgical History:   Procedure Laterality Date    ENDOSCOPY N/A 5/2/2022    Procedure: ESOPHAGOGASTRODUODENOSCOPY WITH BIOPSY;  Surgeon: Jacob Chance MD;  Location: [facility] ENDOSCOPY;  Service: Gastroenterology;  Laterality: N/A;    EYE SURGERY      INCISION AND DRAINAGE LEG Right 1/14/2019    Procedure: Right heel incision and drainage with graft application;  Surgeon: Ar Rueda DPM;  Location: [facility] OR;  Service: Podiatry    INSERTION HEMODIALYSIS CATHETER N/A 4/5/2022    Procedure: HEMODIALYSIS CATHETER INSERTION;  Surgeon: Jean Carlos Guadalupe MD;  Location: [facility] OR;  Service: General;  Laterality: N/A;    LEG SURGERY           MEDICATIONS:  I have reviewed and reconciled the  patients medication list in the patients chart at the [facility] nursing facility today, 03/05/2024.      ALLERGIES:  Allergies   Allergen Reactions    Metformin And Related Anaphylaxis     HA, diarrhea, throat swelling    Metformin GI Intolerance         SOCIAL HISTORY:  Social History     Socioeconomic History    Marital status: Single   Tobacco Use    Smoking status: Never    Smokeless tobacco: Never   Vaping Use    Vaping status: Never Used   Substance and Sexual Activity    Alcohol use: No    Drug use: No    Sexual activity: Defer       FAMILY HISTORY:  Family History   Problem Relation Age of Onset    Asthma Mother     Hypertension Father     Stroke Father        REVIEW OF SYSTEMS:  Review of Systems   Constitutional:  Negative for chills, fatigue and fever.   HENT:  Negative for congestion, ear pain, rhinorrhea, sinus pressure and sore throat.    Eyes:  Negative for visual disturbance.   Respiratory:  Negative for cough, chest tightness, shortness of breath and wheezing.    Cardiovascular:  Negative for chest pain, palpitations and leg swelling.   Gastrointestinal:  Negative for abdominal pain, blood in stool, constipation, diarrhea, nausea and vomiting.   Endocrine: Negative for polydipsia and polyuria.   Genitourinary:  Negative for dysuria and hematuria.   Musculoskeletal:  Negative for arthralgias and back pain.   Skin:  Negative for rash.   Neurological:  Negative for dizziness, light-headedness, numbness and headaches.   Psychiatric/Behavioral:  Negative for dysphoric mood and sleep disturbance. The patient is not nervous/anxious.           PHYSICAL EXAMINATION:     VITAL SIGNS:  /82   Pulse 88   Temp 97.3 °F (36.3 °C)   Resp 18   Wt 105 kg (231 lb 12.8 oz)   SpO2 97%   BMI 37.41 kg/m²     Physical Exam  Vitals and nursing note reviewed.   Constitutional:       Appearance: Normal appearance. She is well-developed. She is obese.   HENT:      Head: Normocephalic and atraumatic.      Nose: Nose  normal.      Mouth/Throat:      Mouth: Mucous membranes are moist.      Pharynx: No oropharyngeal exudate.   Eyes:      General: No scleral icterus.     Conjunctiva/sclera: Conjunctivae normal.      Pupils: Pupils are equal, round, and reactive to light.   Neck:      Thyroid: No thyromegaly.   Cardiovascular:      Rate and Rhythm: Normal rate. Rhythm irregular.      Heart sounds: Normal heart sounds. No murmur heard.     No friction rub. No gallop.   Pulmonary:      Effort: Pulmonary effort is normal. No respiratory distress.      Breath sounds: Normal breath sounds. No wheezing.   Abdominal:      General: Bowel sounds are normal. There is no distension.      Palpations: Abdomen is soft.      Tenderness: There is no abdominal tenderness.   Musculoskeletal:         General: No deformity or signs of injury.      Cervical back: Normal range of motion and neck supple.      Right lower leg: Edema present.      Left lower leg: Edema present.      Comments: Left upper extremity AV fistula    Lymphadenopathy:      Cervical: No cervical adenopathy.   Skin:     General: Skin is warm and dry.      Findings: No rash.   Neurological:      Mental Status: She is alert and oriented to person, place, and time.   Psychiatric:         Mood and Affect: Mood normal.         Behavior: Behavior normal.       RECORDS REVIEW:        ASSESSMENT     Diagnoses and all orders for this visit:    1. Type 2 diabetes mellitus with nephropathy (Primary)    2. Essential hypertension    3. Gastroesophageal reflux disease with esophagitis and hemorrhage    4. Acquired hypothyroidism    5. ESRD (end stage renal disease)    6. Chronic diastolic congestive heart failure    7. Chronic anemia    8. Bilateral edema of lower extremity        PLAN  Anxiety  -Has been stable on Lexapro.     Type 2 diabetes mellitus with hypoglycemia  - Ozempic at current doses is working very well for the patient's glucose control.  -Continue Levemir 10 units daily with sliding  scale.  Fortunately, she has not been requiring much sliding scale.    End-stage renal disease  - Continue hemodialysis as scheduled 3 times a week.  -CBC, CMP, A1c every 3 months.    Vaginal Bleeding  - CT imaging 5/7/22 was suggesting calcified uterine fibroids.  No signs of recurrence at this time.  We will continue to monitor.     Chronic diarrhea  - Stable symptoms at this time.     Bilateral Lower Extremity Edema  -Fair control with elevation and compression     Acute blood loss anemia secondary to esophageal ulcers  -Cont iron supplements.  CBC being monitored by nephrology regularly.      GERD  - Continue PPI.     Diabetic gastroparesis  - Well controlled with Reglan.     Essential hypertension  - Stable controlled with current medication regimen.     Atrial fibrillation  - Stable control of rhythm.  Continue cardiac medications.  - Continue Eliquis.     Hypothyroidism  - Continue current dose of Synthroid.        [x]  Discussed Patient in detail with nursing/staff, addressed all needs today.     [x]  Plan of Care Reviewed   []  PT/OT Reviewed   []  Order Changes  []  Discharge Plans Reviewed  [x]  Advance Directive on file with Nursing Home.   [x]  POA on file with Nursing Home.    [x]  Code Status listed and reviewed.     I confirm accuracy of unchanged data/findings including physical exam and plan which have been carried forward from previous visit, as well as I have updated appropriately those that have changed        Rafiq Baron DO.  3/5/2024        Transcribed from ambient dictation for Rafiq Baron DO by Azalia Hicks.  03/05/24   09:10 EST    Patient or patient representative verbalized consent to the visit recording.  I have personally performed the services described in this document as transcribed by the above individual, and it is both accurate and complete.

## 2024-05-13 NOTE — PROGRESS NOTES
Nursing Home Progress Note        Rafiq Baron DO   [address] [place] [address] Phone: [phone]  Fax: [phone]     PATIENT NAME: Millicent Mckeon                                                                          YOB: 1960           DATE OF SERVICE: 05/14/2024  FACILITY:  [facility]  ______________________________________________________________________     CHIEF COMPLAINT:  Chronic Medical Management        History of Present Illness  The patient is being seen for a routine compliance visit in the nursing facility. Since her admission to the facility, she has been doing very well. Her glucose control has been appropriate and she has not had any issues with blood pressure. She has been compliant with going to dialysis regularly as well.    The patient reports no significant gastrointestinal discomfort. She is currently on a regimen of pantoprazole, administered twice daily.       PAST MEDICAL & SURGICAL HISTORY:   Past Medical History:   Diagnosis Date    A-fib     Anemia 10/02/2018    Diabetes mellitus     Disease of thyroid gland     GERD (gastroesophageal reflux disease)     Gout     History of transfusion     Hypertension     Impaired functional mobility, balance, gait, and endurance       Past Surgical History:   Procedure Laterality Date    ENDOSCOPY N/A 5/2/2022    Procedure: ESOPHAGOGASTRODUODENOSCOPY WITH BIOPSY;  Surgeon: Jacob Chance MD;  Location: [facility] ENDOSCOPY;  Service: Gastroenterology;  Laterality: N/A;    EYE SURGERY      INCISION AND DRAINAGE LEG Right 1/14/2019    Procedure: Right heel incision and drainage with graft application;  Surgeon: Ar Rueda DPM;  Location: [facility] OR;  Service: Podiatry    INSERTION HEMODIALYSIS CATHETER N/A 4/5/2022    Procedure: HEMODIALYSIS CATHETER INSERTION;  Surgeon: Jean Carlos Guadalupe MD;  Location: [facility] OR;  Service: General;  Laterality: N/A;    LEG SURGERY           MEDICATIONS:  I have reviewed  and reconciled the patients medication list in the patients chart at the [facility] nursing facility today, 05/14/2024.      ALLERGIES:  Allergies   Allergen Reactions    Metformin And Related Anaphylaxis     HA, diarrhea, throat swelling    Metformin GI Intolerance         SOCIAL HISTORY:  Social History     Socioeconomic History    Marital status: Single   Tobacco Use    Smoking status: Never    Smokeless tobacco: Never   Vaping Use    Vaping status: Never Used   Substance and Sexual Activity    Alcohol use: No    Drug use: No    Sexual activity: Defer       FAMILY HISTORY:  Family History   Problem Relation Age of Onset    Asthma Mother     Hypertension Father     Stroke Father        REVIEW OF SYSTEMS:  Review of Systems   Constitutional:  Negative for chills, fatigue and fever.   HENT:  Negative for congestion, ear pain, rhinorrhea, sinus pressure and sore throat.    Eyes:  Negative for visual disturbance.   Respiratory:  Negative for cough, chest tightness, shortness of breath and wheezing.    Cardiovascular:  Negative for chest pain, palpitations and leg swelling.   Gastrointestinal:  Negative for abdominal pain, blood in stool, constipation, diarrhea, nausea and vomiting.   Endocrine: Negative for polydipsia and polyuria.   Genitourinary:  Negative for dysuria and hematuria.   Musculoskeletal:  Negative for arthralgias and back pain.   Skin:  Negative for rash.   Neurological:  Negative for dizziness, light-headedness, numbness and headaches.   Psychiatric/Behavioral:  Negative for dysphoric mood and sleep disturbance. The patient is not nervous/anxious.           PHYSICAL EXAMINATION:     VITAL SIGNS:  /74   Pulse 104   Temp 97.3 °F (36.3 °C)   Resp 18   Wt 104 kg (229 lb 14.4 oz)   SpO2 95%   BMI 37.11 kg/m²     Physical Exam  Vitals and nursing note reviewed.   Constitutional:       Appearance: Normal appearance. She is well-developed. She is obese.   HENT:      Head: Normocephalic and  atraumatic.      Nose: Nose normal.      Mouth/Throat:      Mouth: Mucous membranes are moist.      Pharynx: No oropharyngeal exudate.   Eyes:      General: No scleral icterus.     Conjunctiva/sclera: Conjunctivae normal.      Pupils: Pupils are equal, round, and reactive to light.   Neck:      Thyroid: No thyromegaly.   Cardiovascular:      Rate and Rhythm: Normal rate. Rhythm irregular.      Heart sounds: Normal heart sounds. No murmur heard.     No friction rub. No gallop.   Pulmonary:      Effort: Pulmonary effort is normal. No respiratory distress.      Breath sounds: Normal breath sounds. No wheezing.   Abdominal:      General: Bowel sounds are normal. There is no distension.      Palpations: Abdomen is soft.      Tenderness: There is no abdominal tenderness.   Musculoskeletal:         General: No deformity or signs of injury.      Cervical back: Normal range of motion and neck supple.      Right lower leg: Edema present.      Left lower leg: Edema present.      Comments: Left upper extremity AV fistula    Lymphadenopathy:      Cervical: No cervical adenopathy.   Skin:     General: Skin is warm and dry.      Findings: No rash.   Neurological:      Mental Status: She is alert and oriented to person, place, and time.   Psychiatric:         Mood and Affect: Mood normal.         Behavior: Behavior normal.       RECORDS REVIEW:       ASSESSMENT     Diagnoses and all orders for this visit:    1. Gastroesophageal reflux disease with esophagitis and hemorrhage (Primary)    2. Essential hypertension    3. Acquired hypothyroidism    4. Chronic diastolic congestive heart failure    5. ESRD (end stage renal disease)    6. Type 2 diabetes mellitus with nephropathy        PLAN    GERD  - PPI dose has been reduced today to 40 mg pantoprazole daily rather than twice a day.  Consider weaning further if tolerated over the next few months.    Anxiety  -Has been stable on Lexapro.     Type 2 diabetes mellitus with hypoglycemia  -  Ozempic at current doses is working very well for the patient's glucose control.  -Continue Levemir 10 units daily with sliding scale.  Fortunately, she has not been requiring much sliding scale.    End-stage renal disease  - Continue hemodialysis as scheduled 3 times a week.  -CBC, CMP, A1c every 3 months.    Vaginal Bleeding  -Noted 2 years ago.  No signs of persistent bleeding since that time.     Chronic diarrhea  - Stable symptoms at this time.     Bilateral Lower Extremity Edema  -Fair control with elevation and compression     Acute blood loss anemia secondary to esophageal ulcers  -Cont iron supplements.  CBC being monitored by nephrology regularly.        Diabetic gastroparesis  -Stable recently.     Essential hypertension  - Stable controlled with current medication regimen.     Atrial fibrillation  - Stable control of rhythm.  Continue cardiac medications.  - Continue Eliquis.     Hypothyroidism  - Continue current dose of Synthroid.      27 min spent with direct patient care, review of medical chart, discussion with nursing staff, and medical decision making.     [x]  Discussed Patient in detail with nursing/staff, addressed all needs today.     [x]  Plan of Care Reviewed   []  PT/OT Reviewed   [x]  Order Changes  []  Discharge Plans Reviewed  [x]  Advance Directive on file with Nursing Home.   [x]  POA on file with Nursing Home.    [x]  Code Status listed and reviewed.     I confirm accuracy of unchanged data/findings including physical exam and plan which have been carried forward from previous visit, as well as I have updated appropriately those that have changed        Rafiq Baron DO.  5/14/2024      Patient or patient representative verbalized consent for the use of Ambient Listening during the visit with  Rafiq Baron DO for chart documentation. 5/14/2024  15:46 EDT

## 2024-05-14 ENCOUNTER — NURSING HOME (OUTPATIENT)
Dept: INTERNAL MEDICINE | Facility: CLINIC | Age: 64
End: 2024-05-14
Payer: MEDICARE

## 2024-05-14 VITALS
OXYGEN SATURATION: 95 % | WEIGHT: 229.9 LBS | RESPIRATION RATE: 18 BRPM | SYSTOLIC BLOOD PRESSURE: 133 MMHG | BODY MASS INDEX: 37.11 KG/M2 | HEART RATE: 104 BPM | DIASTOLIC BLOOD PRESSURE: 74 MMHG | TEMPERATURE: 97.3 F

## 2024-05-14 DIAGNOSIS — I50.32 CHRONIC DIASTOLIC CONGESTIVE HEART FAILURE: ICD-10-CM

## 2024-05-14 DIAGNOSIS — E11.21 TYPE 2 DIABETES MELLITUS WITH NEPHROPATHY: ICD-10-CM

## 2024-05-14 DIAGNOSIS — I10 ESSENTIAL HYPERTENSION: ICD-10-CM

## 2024-05-14 DIAGNOSIS — E03.9 ACQUIRED HYPOTHYROIDISM: ICD-10-CM

## 2024-05-14 DIAGNOSIS — N18.6 ESRD (END STAGE RENAL DISEASE): ICD-10-CM

## 2024-05-14 DIAGNOSIS — K21.01 GASTROESOPHAGEAL REFLUX DISEASE WITH ESOPHAGITIS AND HEMORRHAGE: Primary | ICD-10-CM

## 2024-05-14 PROCEDURE — 99309 SBSQ NF CARE MODERATE MDM 30: CPT | Performed by: INTERNAL MEDICINE

## 2024-05-14 NOTE — LETTER
Nursing Home Progress Note        Rafiq Baron DO   [address] [place] [address] Phone: [phone]  Fax: [phone]     PATIENT NAME: Millicent Mckeon                                                                          YOB: 1960           DATE OF SERVICE: 05/14/2024  FACILITY:  [facility]  ______________________________________________________________________     CHIEF COMPLAINT:  Chronic Medical Management        History of Present Illness  The patient is being seen for a routine compliance visit in the nursing facility. Since her admission to the facility, she has been doing very well. Her glucose control has been appropriate and she has not had any issues with blood pressure. She has been compliant with going to dialysis regularly as well.    The patient reports no significant gastrointestinal discomfort. She is currently on a regimen of pantoprazole, administered twice daily.       PAST MEDICAL & SURGICAL HISTORY:   Past Medical History:   Diagnosis Date    A-fib     Anemia 10/02/2018    Diabetes mellitus     Disease of thyroid gland     GERD (gastroesophageal reflux disease)     Gout     History of transfusion     Hypertension     Impaired functional mobility, balance, gait, and endurance       Past Surgical History:   Procedure Laterality Date    ENDOSCOPY N/A 5/2/2022    Procedure: ESOPHAGOGASTRODUODENOSCOPY WITH BIOPSY;  Surgeon: Jacob Chance MD;  Location: [facility] ENDOSCOPY;  Service: Gastroenterology;  Laterality: N/A;    EYE SURGERY      INCISION AND DRAINAGE LEG Right 1/14/2019    Procedure: Right heel incision and drainage with graft application;  Surgeon: Ar Rueda DPM;  Location: [facility] OR;  Service: Podiatry    INSERTION HEMODIALYSIS CATHETER N/A 4/5/2022    Procedure: HEMODIALYSIS CATHETER INSERTION;  Surgeon: Jean Carlos Guadalupe MD;  Location: [facility] OR;  Service: General;  Laterality: N/A;    LEG SURGERY           MEDICATIONS:  I have reviewed  and reconciled the patients medication list in the patients chart at the [facility] nursing facility today, 05/14/2024.      ALLERGIES:  Allergies   Allergen Reactions    Metformin And Related Anaphylaxis     HA, diarrhea, throat swelling    Metformin GI Intolerance         SOCIAL HISTORY:  Social History     Socioeconomic History    Marital status: Single   Tobacco Use    Smoking status: Never    Smokeless tobacco: Never   Vaping Use    Vaping status: Never Used   Substance and Sexual Activity    Alcohol use: No    Drug use: No    Sexual activity: Defer       FAMILY HISTORY:  Family History   Problem Relation Age of Onset    Asthma Mother     Hypertension Father     Stroke Father        REVIEW OF SYSTEMS:  Review of Systems   Constitutional:  Negative for chills, fatigue and fever.   HENT:  Negative for congestion, ear pain, rhinorrhea, sinus pressure and sore throat.    Eyes:  Negative for visual disturbance.   Respiratory:  Negative for cough, chest tightness, shortness of breath and wheezing.    Cardiovascular:  Negative for chest pain, palpitations and leg swelling.   Gastrointestinal:  Negative for abdominal pain, blood in stool, constipation, diarrhea, nausea and vomiting.   Endocrine: Negative for polydipsia and polyuria.   Genitourinary:  Negative for dysuria and hematuria.   Musculoskeletal:  Negative for arthralgias and back pain.   Skin:  Negative for rash.   Neurological:  Negative for dizziness, light-headedness, numbness and headaches.   Psychiatric/Behavioral:  Negative for dysphoric mood and sleep disturbance. The patient is not nervous/anxious.           PHYSICAL EXAMINATION:     VITAL SIGNS:  /74   Pulse 104   Temp 97.3 °F (36.3 °C)   Resp 18   Wt 104 kg (229 lb 14.4 oz)   SpO2 95%   BMI 37.11 kg/m²     Physical Exam  Vitals and nursing note reviewed.   Constitutional:       Appearance: Normal appearance. She is well-developed. She is obese.   HENT:      Head: Normocephalic and  atraumatic.      Nose: Nose normal.      Mouth/Throat:      Mouth: Mucous membranes are moist.      Pharynx: No oropharyngeal exudate.   Eyes:      General: No scleral icterus.     Conjunctiva/sclera: Conjunctivae normal.      Pupils: Pupils are equal, round, and reactive to light.   Neck:      Thyroid: No thyromegaly.   Cardiovascular:      Rate and Rhythm: Normal rate. Rhythm irregular.      Heart sounds: Normal heart sounds. No murmur heard.     No friction rub. No gallop.   Pulmonary:      Effort: Pulmonary effort is normal. No respiratory distress.      Breath sounds: Normal breath sounds. No wheezing.   Abdominal:      General: Bowel sounds are normal. There is no distension.      Palpations: Abdomen is soft.      Tenderness: There is no abdominal tenderness.   Musculoskeletal:         General: No deformity or signs of injury.      Cervical back: Normal range of motion and neck supple.      Right lower leg: Edema present.      Left lower leg: Edema present.      Comments: Left upper extremity AV fistula    Lymphadenopathy:      Cervical: No cervical adenopathy.   Skin:     General: Skin is warm and dry.      Findings: No rash.   Neurological:      Mental Status: She is alert and oriented to person, place, and time.   Psychiatric:         Mood and Affect: Mood normal.         Behavior: Behavior normal.       RECORDS REVIEW:       ASSESSMENT     Diagnoses and all orders for this visit:    1. Gastroesophageal reflux disease with esophagitis and hemorrhage (Primary)    2. Essential hypertension    3. Acquired hypothyroidism    4. Chronic diastolic congestive heart failure    5. ESRD (end stage renal disease)    6. Type 2 diabetes mellitus with nephropathy        PLAN    GERD  - PPI dose has been reduced today to 40 mg pantoprazole daily rather than twice a day.  Consider weaning further if tolerated over the next few months.    Anxiety  -Has been stable on Lexapro.     Type 2 diabetes mellitus with hypoglycemia  -  Ozempic at current doses is working very well for the patient's glucose control.  -Continue Levemir 10 units daily with sliding scale.  Fortunately, she has not been requiring much sliding scale.    End-stage renal disease  - Continue hemodialysis as scheduled 3 times a week.  -CBC, CMP, A1c every 3 months.    Vaginal Bleeding  -Noted 2 years ago.  No signs of persistent bleeding since that time.     Chronic diarrhea  - Stable symptoms at this time.     Bilateral Lower Extremity Edema  -Fair control with elevation and compression     Acute blood loss anemia secondary to esophageal ulcers  -Cont iron supplements.  CBC being monitored by nephrology regularly.        Diabetic gastroparesis  -Stable recently.     Essential hypertension  - Stable controlled with current medication regimen.     Atrial fibrillation  - Stable control of rhythm.  Continue cardiac medications.  - Continue Eliquis.     Hypothyroidism  - Continue current dose of Synthroid.      27 min spent with direct patient care, review of medical chart, discussion with nursing staff, and medical decision making.     [x]  Discussed Patient in detail with nursing/staff, addressed all needs today.     [x]  Plan of Care Reviewed   []  PT/OT Reviewed   [x]  Order Changes  []  Discharge Plans Reviewed  [x]  Advance Directive on file with Nursing Home.   [x]  POA on file with Nursing Home.    [x]  Code Status listed and reviewed.     I confirm accuracy of unchanged data/findings including physical exam and plan which have been carried forward from previous visit, as well as I have updated appropriately those that have changed        Rafiq Baron DO.  5/14/2024      Patient or patient representative verbalized consent for the use of Ambient Listening during the visit with  Rafiq Baron DO for chart documentation. 5/14/2024  15:46 EDT

## 2024-06-01 NOTE — ADDENDUM NOTE
Addendum  created 04/05/22 1631 by Carlos Ahn, CRNA    Review and Sign - Ready for Procedure      
No

## 2024-07-08 NOTE — PROGRESS NOTES
Nursing Home Progress Note        Rfaiq Baron DO   [address] [place] [address] Phone: [phone]  Fax: [phone]     PATIENT NAME: Millicent Mckeon                                                                          YOB: 1960           DATE OF SERVICE: 07/09/2024  FACILITY:  [facility]  ______________________________________________________________________     CHIEF COMPLAINT:  Chronic Medical Management      History of Present Illness  The patient is a 63-year-old female with history of diabetes and end-stage renal disease who is seen in the nursing facility for routine medical management.    The patient, who is content with her care and medication regimen, has been adhering to all her prescribed medications and attending dialysis sessions. She reported that her gastroesophageal reflux disease (GERD) symptoms were well-managed with her current medications, and she expressed a desire to maintain her current regimen. Her glucose control has been satisfactory with her current insulin regimen.       PAST MEDICAL & SURGICAL HISTORY:   Past Medical History:   Diagnosis Date    A-fib     Acute gastric ulcer without hemorrhage or perforation     Allergic     Anemia 10/02/2018    CHF (congestive heart failure)     Chronic respiratory failure with hypoxia     Chronic systolic (congestive) heart failure     Chronic venous hypertension involving both sides     Diabetes mellitus     Disease of thyroid gland     Edema of both lower extremities     End-stage renal disease on hemodialysis     Esophagitis     Gastroparesis     GERD (gastroesophageal reflux disease)     Gout     History of atrial flutter     History of transfusion     Hyperlipidemia     Hypertension     Hypothyroidism     Impaired functional mobility and activity tolerance     Impaired functional mobility, balance, gait, and endurance     Loss of consciousness     Lymphadenopathy     Morbid obesity     Mucopurulent chronic  bronchitis     Nocturnal hypoxia     Obstructive sleep apnea syndrome     Pneumonia     Pressure ulcer of right heel, unstageable     Pulmonary hypertension     Secondary hyperparathyroidism     Stage 3 chronic kidney disease     Tachycardia-bradycardia syndrome     Type 2 diabetes mellitus with diabetic chronic kidney disease     Unspecified severe protein-calorie malnutrition     Vitamin B 12 deficiency       Past Surgical History:   Procedure Laterality Date    ENDOSCOPY N/A 5/2/2022    Procedure: ESOPHAGOGASTRODUODENOSCOPY WITH BIOPSY;  Surgeon: Jacob Chance MD;  Location: [facility] ENDOSCOPY;  Service: Gastroenterology;  Laterality: N/A;    EYE SURGERY      INCISION AND DRAINAGE LEG Right 1/14/2019    Procedure: Right heel incision and drainage with graft application;  Surgeon: Ar Rueda DPM;  Location: [facility] OR;  Service: Podiatry    INSERTION HEMODIALYSIS CATHETER N/A 4/5/2022    Procedure: HEMODIALYSIS CATHETER INSERTION;  Surgeon: Jean Carlos Guadalupe MD;  Location: [facility] OR;  Service: General;  Laterality: N/A;    LEG SURGERY           MEDICATIONS:  I have reviewed and reconciled the patients medication list in the patients chart at the skilled nursing facility today, 07/09/2024.      ALLERGIES:  Allergies   Allergen Reactions    Metformin And Related Anaphylaxis     HA, diarrhea, throat swelling    Metformin GI Intolerance         SOCIAL HISTORY:  Social History     Socioeconomic History    Marital status: Single   Tobacco Use    Smoking status: Never    Smokeless tobacco: Never   Vaping Use    Vaping status: Never Used   Substance and Sexual Activity    Alcohol use: No    Drug use: No    Sexual activity: Defer       FAMILY HISTORY:  Family History   Problem Relation Age of Onset    Asthma Mother     Hypertension Father     Stroke Father        REVIEW OF SYSTEMS:  Review of Systems   Constitutional:  Negative for chills, fatigue and fever.   HENT:  Negative for congestion, ear pain,  rhinorrhea, sinus pressure and sore throat.    Eyes:  Negative for visual disturbance.   Respiratory:  Negative for cough, chest tightness, shortness of breath and wheezing.    Cardiovascular:  Negative for chest pain, palpitations and leg swelling.   Gastrointestinal:  Negative for abdominal pain, blood in stool, constipation, diarrhea, nausea and vomiting.   Endocrine: Negative for polydipsia and polyuria.   Genitourinary:  Negative for dysuria and hematuria.   Musculoskeletal:  Negative for arthralgias and back pain.   Skin:  Negative for rash.   Neurological:  Negative for dizziness, light-headedness, numbness and headaches.   Psychiatric/Behavioral:  Negative for dysphoric mood and sleep disturbance. The patient is not nervous/anxious.           PHYSICAL EXAMINATION:     VITAL SIGNS:  /81   Pulse 98   Temp 97.3 °F (36.3 °C)   Resp 18   SpO2 96%     Physical Exam  Vitals and nursing note reviewed.   Constitutional:       Appearance: Normal appearance. She is well-developed. She is obese.   HENT:      Head: Normocephalic and atraumatic.      Nose: Nose normal.      Mouth/Throat:      Mouth: Mucous membranes are moist.      Pharynx: No oropharyngeal exudate.   Eyes:      General: No scleral icterus.     Conjunctiva/sclera: Conjunctivae normal.      Pupils: Pupils are equal, round, and reactive to light.   Neck:      Thyroid: No thyromegaly.   Cardiovascular:      Rate and Rhythm: Normal rate. Rhythm irregular.      Heart sounds: Normal heart sounds. No murmur heard.     No friction rub. No gallop.   Pulmonary:      Effort: Pulmonary effort is normal. No respiratory distress.      Breath sounds: Normal breath sounds. No wheezing.   Abdominal:      General: Bowel sounds are normal. There is no distension.      Palpations: Abdomen is soft.      Tenderness: There is no abdominal tenderness.   Musculoskeletal:         General: No deformity or signs of injury.      Cervical back: Normal range of motion and  neck supple.      Right lower leg: Edema present.      Left lower leg: Edema present.      Comments: Left upper extremity AV fistula    Lymphadenopathy:      Cervical: No cervical adenopathy.   Skin:     General: Skin is warm and dry.      Findings: No rash.   Neurological:      Mental Status: She is alert and oriented to person, place, and time.   Psychiatric:         Mood and Affect: Mood normal.         Behavior: Behavior normal.       RECORDS REVIEW:       ASSESSMENT     Diagnoses and all orders for this visit:    1. Gastroesophageal reflux disease with esophagitis and hemorrhage (Primary)    2. Essential hypertension    3. Acquired hypothyroidism    4. Chronic diastolic congestive heart failure    5. ESRD (end stage renal disease)    6. Type 2 diabetes mellitus with nephropathy    7. Chronic anemia    8. Bilateral edema of lower extremity        PLAN  Assessment & Plan  Routine medical management.  Laboratory tests have been missed for varying reasons.  Lab orders were clarified today: CBC, CMP, and A1c tests every 3 months.    GERD  - stable with pantoprazole.    Anxiety  -Has been stable on Lexapro.     Type 2 diabetes mellitus with hypoglycemia  - Ozempic at current doses is working very well for the patient's glucose control.  -Continue Levemir 10 units daily with sliding scale.  Fortunately, she has not been requiring much sliding scale.    End-stage renal disease  - Continue hemodialysis as scheduled 3 times a week.  -CBC, CMP, A1c every 3 months.    Vaginal Bleeding  -Noted 2 years ago.  No signs of persistent bleeding since that time.     Chronic diarrhea  - Stable symptoms at this time.     Bilateral Lower Extremity Edema  -Fair control with elevation and compression     Acute blood loss anemia secondary to esophageal ulcers  -Cont iron supplements.  CBC being monitored by nephrology regularly.        Diabetic gastroparesis  -Stable recently.     Essential hypertension  - Stable controlled with current  medication regimen.     Atrial fibrillation  - Stable control of rhythm.  Continue cardiac medications.  - Continue Eliquis.     Hypothyroidism  - Continue current dose of Synthroid.        [x]  Discussed Patient in detail with nursing/staff, addressed all needs today.     [x]  Plan of Care Reviewed   []  PT/OT Reviewed   [x]  Order Changes  []  Discharge Plans Reviewed  [x]  Advance Directive on file with Nursing Home.   [x]  POA on file with Nursing Home.    [x]  Code Status listed and reviewed.     I confirm accuracy of unchanged data/findings including physical exam and plan which have been carried forward from previous visit, as well as I have updated appropriately those that have changed        Rafiq Baron DO.  7/19/2024      Patient or patient representative verbalized consent for the use of Ambient Listening during the visit with  Rafiq Baron DO for chart documentation. 7/19/2024  21:06 EDT

## 2024-07-09 ENCOUNTER — NURSING HOME (OUTPATIENT)
Dept: INTERNAL MEDICINE | Facility: CLINIC | Age: 64
End: 2024-07-09
Payer: MEDICARE

## 2024-07-09 VITALS
RESPIRATION RATE: 18 BRPM | SYSTOLIC BLOOD PRESSURE: 131 MMHG | HEART RATE: 98 BPM | DIASTOLIC BLOOD PRESSURE: 81 MMHG | OXYGEN SATURATION: 96 % | TEMPERATURE: 97.3 F

## 2024-07-09 DIAGNOSIS — R60.0 BILATERAL EDEMA OF LOWER EXTREMITY: ICD-10-CM

## 2024-07-09 DIAGNOSIS — I50.32 CHRONIC DIASTOLIC CONGESTIVE HEART FAILURE: ICD-10-CM

## 2024-07-09 DIAGNOSIS — I10 ESSENTIAL HYPERTENSION: ICD-10-CM

## 2024-07-09 DIAGNOSIS — D64.9 CHRONIC ANEMIA: ICD-10-CM

## 2024-07-09 DIAGNOSIS — E03.9 ACQUIRED HYPOTHYROIDISM: ICD-10-CM

## 2024-07-09 DIAGNOSIS — E11.21 TYPE 2 DIABETES MELLITUS WITH NEPHROPATHY: ICD-10-CM

## 2024-07-09 DIAGNOSIS — K21.01 GASTROESOPHAGEAL REFLUX DISEASE WITH ESOPHAGITIS AND HEMORRHAGE: Primary | ICD-10-CM

## 2024-07-09 DIAGNOSIS — N18.6 ESRD (END STAGE RENAL DISEASE): ICD-10-CM

## 2024-07-09 PROCEDURE — 99308 SBSQ NF CARE LOW MDM 20: CPT | Performed by: INTERNAL MEDICINE

## 2024-07-09 NOTE — LETTER
Nursing Home Progress Note        Rafiq Baron DO   [address] [place] [address] Phone: [phone]  Fax: [phone]     PATIENT NAME: Millicent Mckeon                                                                          YOB: 1960           DATE OF SERVICE: 07/09/2024  FACILITY:  [facility]  ______________________________________________________________________     CHIEF COMPLAINT:  Chronic Medical Management      History of Present Illness  The patient is a 63-year-old female with history of diabetes and end-stage renal disease who is seen in the nursing facility for routine medical management.    The patient, who is content with her care and medication regimen, has been adhering to all her prescribed medications and attending dialysis sessions. She reported that her gastroesophageal reflux disease (GERD) symptoms were well-managed with her current medications, and she expressed a desire to maintain her current regimen. Her glucose control has been satisfactory with her current insulin regimen.       PAST MEDICAL & SURGICAL HISTORY:   Past Medical History:   Diagnosis Date    A-fib     Acute gastric ulcer without hemorrhage or perforation     Allergic     Anemia 10/02/2018    CHF (congestive heart failure)     Chronic respiratory failure with hypoxia     Chronic systolic (congestive) heart failure     Chronic venous hypertension involving both sides     Diabetes mellitus     Disease of thyroid gland     Edema of both lower extremities     End-stage renal disease on hemodialysis     Esophagitis     Gastroparesis     GERD (gastroesophageal reflux disease)     Gout     History of atrial flutter     History of transfusion     Hyperlipidemia     Hypertension     Hypothyroidism     Impaired functional mobility and activity tolerance     Impaired functional mobility, balance, gait, and endurance     Loss of consciousness     Lymphadenopathy     Morbid obesity     Mucopurulent chronic  bronchitis     Nocturnal hypoxia     Obstructive sleep apnea syndrome     Pneumonia     Pressure ulcer of right heel, unstageable     Pulmonary hypertension     Secondary hyperparathyroidism     Stage 3 chronic kidney disease     Tachycardia-bradycardia syndrome     Type 2 diabetes mellitus with diabetic chronic kidney disease     Unspecified severe protein-calorie malnutrition     Vitamin B 12 deficiency       Past Surgical History:   Procedure Laterality Date    ENDOSCOPY N/A 5/2/2022    Procedure: ESOPHAGOGASTRODUODENOSCOPY WITH BIOPSY;  Surgeon: Jacob Chance MD;  Location: [facility] ENDOSCOPY;  Service: Gastroenterology;  Laterality: N/A;    EYE SURGERY      INCISION AND DRAINAGE LEG Right 1/14/2019    Procedure: Right heel incision and drainage with graft application;  Surgeon: Ar Rueda DPM;  Location: [facility] OR;  Service: Podiatry    INSERTION HEMODIALYSIS CATHETER N/A 4/5/2022    Procedure: HEMODIALYSIS CATHETER INSERTION;  Surgeon: Jean Carlos Guadalupe MD;  Location: [facility] OR;  Service: General;  Laterality: N/A;    LEG SURGERY           MEDICATIONS:  I have reviewed and reconciled the patients medication list in the patients chart at the skilled nursing facility today, 07/09/2024.      ALLERGIES:  Allergies   Allergen Reactions    Metformin And Related Anaphylaxis     HA, diarrhea, throat swelling    Metformin GI Intolerance         SOCIAL HISTORY:  Social History     Socioeconomic History    Marital status: Single   Tobacco Use    Smoking status: Never    Smokeless tobacco: Never   Vaping Use    Vaping status: Never Used   Substance and Sexual Activity    Alcohol use: No    Drug use: No    Sexual activity: Defer       FAMILY HISTORY:  Family History   Problem Relation Age of Onset    Asthma Mother     Hypertension Father     Stroke Father        REVIEW OF SYSTEMS:  Review of Systems   Constitutional:  Negative for chills, fatigue and fever.   HENT:  Negative for congestion, ear pain,  rhinorrhea, sinus pressure and sore throat.    Eyes:  Negative for visual disturbance.   Respiratory:  Negative for cough, chest tightness, shortness of breath and wheezing.    Cardiovascular:  Negative for chest pain, palpitations and leg swelling.   Gastrointestinal:  Negative for abdominal pain, blood in stool, constipation, diarrhea, nausea and vomiting.   Endocrine: Negative for polydipsia and polyuria.   Genitourinary:  Negative for dysuria and hematuria.   Musculoskeletal:  Negative for arthralgias and back pain.   Skin:  Negative for rash.   Neurological:  Negative for dizziness, light-headedness, numbness and headaches.   Psychiatric/Behavioral:  Negative for dysphoric mood and sleep disturbance. The patient is not nervous/anxious.           PHYSICAL EXAMINATION:     VITAL SIGNS:  /81   Pulse 98   Temp 97.3 °F (36.3 °C)   Resp 18   SpO2 96%     Physical Exam  Vitals and nursing note reviewed.   Constitutional:       Appearance: Normal appearance. She is well-developed. She is obese.   HENT:      Head: Normocephalic and atraumatic.      Nose: Nose normal.      Mouth/Throat:      Mouth: Mucous membranes are moist.      Pharynx: No oropharyngeal exudate.   Eyes:      General: No scleral icterus.     Conjunctiva/sclera: Conjunctivae normal.      Pupils: Pupils are equal, round, and reactive to light.   Neck:      Thyroid: No thyromegaly.   Cardiovascular:      Rate and Rhythm: Normal rate. Rhythm irregular.      Heart sounds: Normal heart sounds. No murmur heard.     No friction rub. No gallop.   Pulmonary:      Effort: Pulmonary effort is normal. No respiratory distress.      Breath sounds: Normal breath sounds. No wheezing.   Abdominal:      General: Bowel sounds are normal. There is no distension.      Palpations: Abdomen is soft.      Tenderness: There is no abdominal tenderness.   Musculoskeletal:         General: No deformity or signs of injury.      Cervical back: Normal range of motion and  neck supple.      Right lower leg: Edema present.      Left lower leg: Edema present.      Comments: Left upper extremity AV fistula    Lymphadenopathy:      Cervical: No cervical adenopathy.   Skin:     General: Skin is warm and dry.      Findings: No rash.   Neurological:      Mental Status: She is alert and oriented to person, place, and time.   Psychiatric:         Mood and Affect: Mood normal.         Behavior: Behavior normal.       RECORDS REVIEW:       ASSESSMENT     Diagnoses and all orders for this visit:    1. Gastroesophageal reflux disease with esophagitis and hemorrhage (Primary)    2. Essential hypertension    3. Acquired hypothyroidism    4. Chronic diastolic congestive heart failure    5. ESRD (end stage renal disease)    6. Type 2 diabetes mellitus with nephropathy    7. Chronic anemia    8. Bilateral edema of lower extremity        PLAN  Assessment & Plan  Routine medical management.  Laboratory tests have been missed for varying reasons.  Lab orders were clarified today: CBC, CMP, and A1c tests every 3 months.    GERD  - stable with pantoprazole.    Anxiety  -Has been stable on Lexapro.     Type 2 diabetes mellitus with hypoglycemia  - Ozempic at current doses is working very well for the patient's glucose control.  -Continue Levemir 10 units daily with sliding scale.  Fortunately, she has not been requiring much sliding scale.    End-stage renal disease  - Continue hemodialysis as scheduled 3 times a week.  -CBC, CMP, A1c every 3 months.    Vaginal Bleeding  -Noted 2 years ago.  No signs of persistent bleeding since that time.     Chronic diarrhea  - Stable symptoms at this time.     Bilateral Lower Extremity Edema  -Fair control with elevation and compression     Acute blood loss anemia secondary to esophageal ulcers  -Cont iron supplements.  CBC being monitored by nephrology regularly.        Diabetic gastroparesis  -Stable recently.     Essential hypertension  - Stable controlled with current  medication regimen.     Atrial fibrillation  - Stable control of rhythm.  Continue cardiac medications.  - Continue Eliquis.     Hypothyroidism  - Continue current dose of Synthroid.        [x]  Discussed Patient in detail with nursing/staff, addressed all needs today.     [x]  Plan of Care Reviewed   []  PT/OT Reviewed   [x]  Order Changes  []  Discharge Plans Reviewed  [x]  Advance Directive on file with Nursing Home.   [x]  POA on file with Nursing Home.    [x]  Code Status listed and reviewed.     I confirm accuracy of unchanged data/findings including physical exam and plan which have been carried forward from previous visit, as well as I have updated appropriately those that have changed        Rafiq Baron DO.  7/19/2024      Patient or patient representative verbalized consent for the use of Ambient Listening during the visit with  Rafiq Baron DO for chart documentation. 7/19/2024  21:06 EDT

## 2024-07-12 ENCOUNTER — APPOINTMENT (OUTPATIENT)
Dept: ULTRASOUND IMAGING | Facility: HOSPITAL | Age: 64
End: 2024-07-12
Payer: MEDICARE

## 2024-07-12 ENCOUNTER — HOSPITAL ENCOUNTER (EMERGENCY)
Facility: HOSPITAL | Age: 64
Discharge: OTHER INSTITUTION | End: 2024-07-12
Attending: STUDENT IN AN ORGANIZED HEALTH CARE EDUCATION/TRAINING PROGRAM
Payer: MEDICARE

## 2024-07-12 VITALS
RESPIRATION RATE: 16 BRPM | OXYGEN SATURATION: 95 % | DIASTOLIC BLOOD PRESSURE: 90 MMHG | SYSTOLIC BLOOD PRESSURE: 137 MMHG | HEART RATE: 101 BPM | TEMPERATURE: 97.9 F

## 2024-07-12 DIAGNOSIS — T82.898A PROBLEM WITH DIALYSIS SHUNT, INITIAL ENCOUNTER: Primary | ICD-10-CM

## 2024-07-12 LAB
ALBUMIN SERPL-MCNC: 4.1 G/DL (ref 3.5–5.2)
ALBUMIN/GLOB SERPL: 1.5 G/DL
ALP SERPL-CCNC: 207 U/L (ref 39–117)
ALT SERPL W P-5'-P-CCNC: 11 U/L (ref 1–33)
ANION GAP SERPL CALCULATED.3IONS-SCNC: 14.4 MMOL/L (ref 5–15)
AST SERPL-CCNC: 11 U/L (ref 1–32)
BASOPHILS # BLD AUTO: 0.04 10*3/MM3 (ref 0–0.2)
BASOPHILS NFR BLD AUTO: 0.6 % (ref 0–1.5)
BILIRUB SERPL-MCNC: 0.2 MG/DL (ref 0–1.2)
BUN SERPL-MCNC: 54 MG/DL (ref 8–23)
BUN/CREAT SERPL: 7.9 (ref 7–25)
CALCIUM SPEC-SCNC: 9.2 MG/DL (ref 8.6–10.5)
CHLORIDE SERPL-SCNC: 97 MMOL/L (ref 98–107)
CO2 SERPL-SCNC: 24.6 MMOL/L (ref 22–29)
CREAT SERPL-MCNC: 6.87 MG/DL (ref 0.57–1)
CRP SERPL-MCNC: 0.56 MG/DL (ref 0–0.5)
D-LACTATE SERPL-SCNC: 1.2 MMOL/L (ref 0.5–2)
DEPRECATED RDW RBC AUTO: 48.8 FL (ref 37–54)
EGFRCR SERPLBLD CKD-EPI 2021: 6.2 ML/MIN/1.73
EOSINOPHIL # BLD AUTO: 0.3 10*3/MM3 (ref 0–0.4)
EOSINOPHIL NFR BLD AUTO: 4.8 % (ref 0.3–6.2)
ERYTHROCYTE [DISTWIDTH] IN BLOOD BY AUTOMATED COUNT: 14.3 % (ref 12.3–15.4)
GLOBULIN UR ELPH-MCNC: 2.7 GM/DL
GLUCOSE SERPL-MCNC: 219 MG/DL (ref 65–99)
HCT VFR BLD AUTO: 29.5 % (ref 34–46.6)
HGB BLD-MCNC: 9.4 G/DL (ref 12–15.9)
IMM GRANULOCYTES # BLD AUTO: 0.03 10*3/MM3 (ref 0–0.05)
IMM GRANULOCYTES NFR BLD AUTO: 0.5 % (ref 0–0.5)
LYMPHOCYTES # BLD AUTO: 1.66 10*3/MM3 (ref 0.7–3.1)
LYMPHOCYTES NFR BLD AUTO: 26.8 % (ref 19.6–45.3)
MAGNESIUM SERPL-MCNC: 2.2 MG/DL (ref 1.6–2.4)
MCH RBC QN AUTO: 29.5 PG (ref 26.6–33)
MCHC RBC AUTO-ENTMCNC: 31.9 G/DL (ref 31.5–35.7)
MCV RBC AUTO: 92.5 FL (ref 79–97)
MONOCYTES # BLD AUTO: 0.7 10*3/MM3 (ref 0.1–0.9)
MONOCYTES NFR BLD AUTO: 11.3 % (ref 5–12)
NEUTROPHILS NFR BLD AUTO: 3.47 10*3/MM3 (ref 1.7–7)
NEUTROPHILS NFR BLD AUTO: 56 % (ref 42.7–76)
NRBC BLD AUTO-RTO: 0 /100 WBC (ref 0–0.2)
PLATELET # BLD AUTO: 142 10*3/MM3 (ref 140–450)
PMV BLD AUTO: 9.8 FL (ref 6–12)
POTASSIUM SERPL-SCNC: 4.5 MMOL/L (ref 3.5–5.2)
PROCALCITONIN SERPL-MCNC: 0.58 NG/ML (ref 0–0.25)
PROT SERPL-MCNC: 6.8 G/DL (ref 6–8.5)
RBC # BLD AUTO: 3.19 10*6/MM3 (ref 3.77–5.28)
SODIUM SERPL-SCNC: 136 MMOL/L (ref 136–145)
WBC NRBC COR # BLD AUTO: 6.2 10*3/MM3 (ref 3.4–10.8)

## 2024-07-12 PROCEDURE — 85025 COMPLETE CBC W/AUTO DIFF WBC: CPT | Performed by: STUDENT IN AN ORGANIZED HEALTH CARE EDUCATION/TRAINING PROGRAM

## 2024-07-12 PROCEDURE — 99285 EMERGENCY DEPT VISIT HI MDM: CPT

## 2024-07-12 PROCEDURE — 93931 UPPER EXTREMITY STUDY: CPT

## 2024-07-12 PROCEDURE — 80053 COMPREHEN METABOLIC PANEL: CPT | Performed by: STUDENT IN AN ORGANIZED HEALTH CARE EDUCATION/TRAINING PROGRAM

## 2024-07-12 PROCEDURE — 36415 COLL VENOUS BLD VENIPUNCTURE: CPT

## 2024-07-12 PROCEDURE — 93005 ELECTROCARDIOGRAM TRACING: CPT | Performed by: STUDENT IN AN ORGANIZED HEALTH CARE EDUCATION/TRAINING PROGRAM

## 2024-07-12 PROCEDURE — 83605 ASSAY OF LACTIC ACID: CPT | Performed by: STUDENT IN AN ORGANIZED HEALTH CARE EDUCATION/TRAINING PROGRAM

## 2024-07-12 PROCEDURE — 83735 ASSAY OF MAGNESIUM: CPT | Performed by: STUDENT IN AN ORGANIZED HEALTH CARE EDUCATION/TRAINING PROGRAM

## 2024-07-12 PROCEDURE — 86140 C-REACTIVE PROTEIN: CPT | Performed by: STUDENT IN AN ORGANIZED HEALTH CARE EDUCATION/TRAINING PROGRAM

## 2024-07-12 PROCEDURE — 84145 PROCALCITONIN (PCT): CPT | Performed by: STUDENT IN AN ORGANIZED HEALTH CARE EDUCATION/TRAINING PROGRAM

## 2024-07-12 NOTE — ED NOTES
Per MD Deirdre request, called [facility] att requesting Dr. Leone. NA rep stated she would page Sulema and call back.

## 2024-07-12 NOTE — ED NOTES
[facility] transfer center called back att and stated that per Dr. Glaser pt is to go to [facility] ED to register for the special procedure clinic. TT updated.

## 2024-07-12 NOTE — ED NOTES
[facility] transfer center called att for MD Deirdre w/ Dr. Abarca on the line. Call transferred.

## 2024-07-12 NOTE — ED PROVIDER NOTES
Subjective:  History of Present Illness:    Patient is a 63-year-old female history of atrial fibrillation, diabetes mellitus, GERD, hypertension and ESRD on HD Monday Wednesday Friday who presents today with catheter shunt malfunction.  Reports that she has been unable to have her port accessed today.  There is concern that the dialysis facility that her shunt had clotted.  Was sent to our emergency department for evaluation.  Patient last note for full dialysis session on Wednesday.  Denies any shortness of breath, chest pain or palpitations at this time.  Denies any new leg swelling.  Well-appearing on exam.      Nurses Notes reviewed and agree, including vitals, allergies, social history and prior medical history.     REVIEW OF SYSTEMS: All systems reviewed and not pertinent unless noted.  Review of Systems   Constitutional:  Negative for activity change, appetite change, chills, fatigue and fever.   HENT:  Negative for rhinorrhea, sinus pressure and sinus pain.    Eyes:  Negative for discharge and itching.   Respiratory:  Negative for cough and shortness of breath.    Cardiovascular:  Negative for chest pain and leg swelling.   Gastrointestinal:  Negative for abdominal distention, abdominal pain, nausea and vomiting.   Endocrine: Negative for cold intolerance and heat intolerance.   Genitourinary:  Negative for decreased urine volume, difficulty urinating, flank pain, frequency, urgency, vaginal bleeding, vaginal discharge and vaginal pain.   Musculoskeletal:  Negative for gait problem, neck pain and neck stiffness.   Skin:  Positive for color change.        Erythema and swelling to the left arm at the position of patient's AV fistula   Allergic/Immunologic: Negative for environmental allergies.   Neurological:  Negative for seizures, syncope, facial asymmetry and speech difficulty.   Psychiatric/Behavioral:  Negative for self-injury and suicidal ideas.        Past Medical History:   Diagnosis Date    A-fib      Anemia 10/02/2018    Diabetes mellitus     Disease of thyroid gland     GERD (gastroesophageal reflux disease)     Gout     History of transfusion     Hypertension     Impaired functional mobility, balance, gait, and endurance        Allergies:    Metformin and related and Metformin      Past Surgical History:   Procedure Laterality Date    ENDOSCOPY N/A 5/2/2022    Procedure: ESOPHAGOGASTRODUODENOSCOPY WITH BIOPSY;  Surgeon: Jacob Chance MD;  Location: [facility] ENDOSCOPY;  Service: Gastroenterology;  Laterality: N/A;    EYE SURGERY      INCISION AND DRAINAGE LEG Right 1/14/2019    Procedure: Right heel incision and drainage with graft application;  Surgeon: Ar Rueda DPM;  Location: [facility] OR;  Service: Podiatry    INSERTION HEMODIALYSIS CATHETER N/A 4/5/2022    Procedure: HEMODIALYSIS CATHETER INSERTION;  Surgeon: Jean Carlos Guadalupe MD;  Location: [facility] OR;  Service: General;  Laterality: N/A;    LEG SURGERY           Social History     Socioeconomic History    Marital status: Single   Tobacco Use    Smoking status: Never    Smokeless tobacco: Never   Vaping Use    Vaping status: Never Used   Substance and Sexual Activity    Alcohol use: No    Drug use: No    Sexual activity: Defer         Family History   Problem Relation Age of Onset    Asthma Mother     Hypertension Father     Stroke Father        Objective  Physical Exam:  /92 (BP Location: Right arm, Patient Position: Sitting)   Pulse 105   Temp 97.7 °F (36.5 °C) (Oral)   Resp 18   SpO2 94%      Physical Exam  Constitutional:       General: She is not in acute distress.     Appearance: Normal appearance. She is not ill-appearing.   HENT:      Head: Normocephalic and atraumatic.      Nose: Nose normal. No congestion or rhinorrhea.      Mouth/Throat:      Mouth: Mucous membranes are dry.      Pharynx: Oropharynx is clear. No oropharyngeal exudate or posterior oropharyngeal erythema.   Eyes:      Extraocular Movements: Extraocular  movements intact.      Conjunctiva/sclera: Conjunctivae normal.      Pupils: Pupils are equal, round, and reactive to light.   Cardiovascular:      Rate and Rhythm: Normal rate and regular rhythm.      Pulses: Normal pulses.      Heart sounds: Normal heart sounds.   Pulmonary:      Effort: Pulmonary effort is normal. No respiratory distress.      Breath sounds: Normal breath sounds.   Abdominal:      General: Abdomen is flat. Bowel sounds are normal. There is no distension.      Palpations: Abdomen is soft.      Tenderness: There is no abdominal tenderness. There is no guarding or rebound.   Musculoskeletal:         General: No swelling or tenderness. Normal range of motion.      Cervical back: Normal range of motion and neck supple.   Skin:     General: Skin is warm and dry.      Capillary Refill: Capillary refill takes less than 2 seconds.   Neurological:      General: No focal deficit present.      Mental Status: She is alert and oriented to person, place, and time. Mental status is at baseline.      Cranial Nerves: No cranial nerve deficit.      Sensory: No sensory deficit.      Motor: No weakness.      Coordination: Coordination normal.   Psychiatric:         Mood and Affect: Mood normal.         Behavior: Behavior normal.         Thought Content: Thought content normal.         Judgment: Judgment normal.         Procedures    ED Course:    ED Course as of 07/12/24 1135   Fri Jul 12, 2024   0840 EKG interpreted by me, sinus tachycardia with no concerning ST changes noted, rate of 106 [JE]   1134 Spoke with vascular surgery nurse practitioner under Dr. He.  Says that she will discuss options for transfer.  Called back by Dr. Fitzgerald, would like for patient to be transferred to the [facility] emergency department, from there, will go directly back to specials clinic where she will be seen and evaluated and neck steps will be determined.  Report called to [facility] and will transfer to the emergency department  per physician recommendations. [JE]      ED Course User Index  [JE] Dillan Gilmore MD       Lab Results (last 24 hours)       Procedure Component Value Units Date/Time    CBC & Differential [[id number]]  (Abnormal) Collected: 07/12/24 0840    Specimen: Blood Updated: 07/12/24 0844    Narrative:      The following orders were created for panel order CBC & Differential.  Procedure                               Abnormality         Status                     ---------                               -----------         ------                     CBC Auto Differential[[id number]]        Abnormal            Final result                 Please view results for these tests on the individual orders.    Comprehensive Metabolic Panel [[id number]]  (Abnormal) Collected: 07/12/24 0840    Specimen: Blood Updated: 07/12/24 0902     Glucose 219 mg/dL      Comment: Glucose >180, Hemoglobin A1C recommended.        BUN 54 mg/dL      Creatinine 6.87 mg/dL      Sodium 136 mmol/L      Potassium 4.5 mmol/L      Chloride 97 mmol/L      CO2 24.6 mmol/L      Calcium 9.2 mg/dL      Total Protein 6.8 g/dL      Albumin 4.1 g/dL      ALT (SGPT) 11 U/L      AST (SGOT) 11 U/L      Alkaline Phosphatase 207 U/L      Total Bilirubin 0.2 mg/dL      Globulin 2.7 gm/dL      A/G Ratio 1.5 g/dL      BUN/Creatinine Ratio 7.9     Anion Gap 14.4 mmol/L      eGFR 6.2 mL/min/1.73      Comment: <15 Indicative of kidney failure       Narrative:      GFR Normal >60  Chronic Kidney Disease <60  Kidney Failure <15      Magnesium [[id number]]  (Normal) Collected: 07/12/24 0840    Specimen: Blood Updated: 07/12/24 0902     Magnesium 2.2 mg/dL     CBC Auto Differential [[id number]]  (Abnormal) Collected: 07/12/24 0840    Specimen: Blood Updated: 07/12/24 0844     WBC 6.20 10*3/mm3      RBC 3.19 10*6/mm3      Hemoglobin 9.4 g/dL      Hematocrit 29.5 %      MCV 92.5 fL      MCH 29.5 pg      MCHC 31.9 g/dL      RDW 14.3 %      RDW-SD 48.8 fl      MPV 9.8 fL      Platelets  "142 10*3/mm3      Neutrophil % 56.0 %      Lymphocyte % 26.8 %      Monocyte % 11.3 %      Eosinophil % 4.8 %      Basophil % 0.6 %      Immature Grans % 0.5 %      Neutrophils, Absolute 3.47 10*3/mm3      Lymphocytes, Absolute 1.66 10*3/mm3      Monocytes, Absolute 0.70 10*3/mm3      Eosinophils, Absolute 0.30 10*3/mm3      Basophils, Absolute 0.04 10*3/mm3      Immature Grans, Absolute 0.03 10*3/mm3      nRBC 0.0 /100 WBC     C-reactive Protein [[id number]]  (Abnormal) Collected: 07/12/24 0840    Specimen: Blood Updated: 07/12/24 0902     C-Reactive Protein 0.56 mg/dL     Procalcitonin [[id number]]  (Abnormal) Collected: 07/12/24 0840    Specimen: Blood Updated: 07/12/24 0911     Procalcitonin 0.58 ng/mL     Narrative:      As a Marker for Sepsis (Non-Neonates):    1. <0.5 ng/mL represents a low risk of severe sepsis and/or septic shock.  2. >2 ng/mL represents a high risk of severe sepsis and/or septic shock.    As a Marker for Lower Respiratory Tract Infections that require antibiotic therapy:    PCT on Admission    Antibiotic Therapy       6-12 Hrs later    >0.5                Strongly Recommended  >0.25 - <0.5        Recommended   0.1 - 0.25          Discouraged              Remeasure/reassess PCT  <0.1                Strongly Discouraged     Remeasure/reassess PCT    As 28 day mortality risk marker: \"Change in Procalcitonin Result\" (>80% or <=80%) if Day 0 (or Day 1) and Day 4 values are available. Refer to http://www.[facility]-pct-calculator.com    Change in PCT <=80%  A decrease of PCT levels below or equal to 80% defines a positive change in PCT test result representing a higher risk for 28-day all-cause mortality of patients diagnosed with severe sepsis for septic shock.    Change in PCT >80%  A decrease of PCT levels of more than 80% defines a negative change in PCT result representing a lower risk for 28-day all-cause mortality of patients diagnosed with severe sepsis or septic shock.       Lactic Acid, " Plasma [[id number]]  (Normal) Collected: 07/12/24 0930    Specimen: Blood Updated: 07/12/24 0952     Lactate 1.2 mmol/L              US Arterial Doppler Upper Extremity Left    Result Date: 7/12/2024  PROCEDURE: US ARTERIAL DOPPLER UPPER EXTREMITY  LEFT-  ARTERIAL DOPPLER OF THE EXTREMITY  HISTORY: Patient with dialysis shunt, concern for AV shunt malfunction, eval patency, clot  TECHNIQUE: Spectral and color Doppler exam of major artery branches  FINDINGS: Major left upper arm and forearm vessels are patent in their examined portions. Flow velocities and waveforms are normal; biphasic or triphasic flow noted..  No significant plaque disease is seen. Left upper arm fistula is identified and is patent. Distal portion of the fistula is located very superficially. The mid portion of the fistula is located 13 mm deep to the skin and the proximal portion is located 7 mm deep to the skin.      Impression: No findings to suggest significant peripheral vascular disease of the left upper extremity.  Patent AV fistula as described..   This report was signed and finalized on 7/12/2024 9:57 AM by Cookie Abarca MD.          MDM     Amount and/or Complexity of Data Reviewed  Decide to obtain previous medical records or to obtain history from someone other than the patient: yes        Initial impression of presenting illness: Dialysis shunt malfunction    DDX: includes but is not limited to: Dialysis shunt clot, infection of dialysis catheter, fistula failure, electrolyte derangement, respiratory failure secondary to fluid overload    Patient arrives stable with vitals interpreted by myself.     Pertinent features from physical exam: Clear to oscitation, regular rate and rhythm, no murmur, nontender to abdominal palpation    Initial diagnostic plan: BC, CMP, CRP, lactic acid, Pro-Merlin, magnesium, EKG, ultrasound of dialysis shunt    Results from initial plan were reviewed and interpreted by me revealing no need for emergent dialysis,  no obvious obstruction on ultrasound imaging    Diagnostic information from other sources: Discussed with EMS on arrival and reviewed past medical records    Interventions / Re-evaluation: Observed in emergency department for 3 hours no change in vital signs    Results/clinical rationale were discussed with patient at bedside    Consultations/Discussion of results with other physicians: Given concerns for dialysis fistula malfunction with no obvious etiology seen on my workup discussed with Dr. Leone.  He recommends discussion with [facility] for transfer and consideration of fistulogram given his concern for thrombosis of the fistula given his inability to pull flow today for dialysis.  Given this spoke with vascular surgery nurse practitioner under Dr. He.  Says that she will discuss options for transfer.  Called back by Dr. Fitzgerald, would like for patient to be transferred to the [facility] emergency department, from there, will go directly back to specials clinic where she will be seen and evaluated and neck steps will be determined.  Report called to [facility] and will transfer to the emergency department per physician recommendations.    Disposition plan: Transfer  -----        Final diagnoses:   Problem with dialysis shunt, initial encounter          Dillan Gilmore MD  07/12/24 [id number]

## 2024-07-12 NOTE — ED NOTES
Per MD Deirdre request, called [facility] transfer [place] att requesting transfer consult. Awaiting call back.

## 2024-07-12 NOTE — ED NOTES
Called [facility] dispatch att requesting non emergent transport to [facility] ED.

## 2024-07-12 NOTE — ED NOTES
"CALLED AND SPOKE WITH VERNELL (CHARGE RN IN ER) TO GIVE REPORT. I WAS TOLD THIS WAS NOT AN ER TO ER TRANSFER AND TOKK MY NUMBER TO CALL BACK. 1138 PT CALLED BACK AND STATES \"I DON'T ACTUALLY TAKE REPORT BECAUSE REGISTRATION WILL REGISTER HER OUT PATIENT AND SHE'LL GO TO THE SPECIALTY CLINIC.\"  "

## 2024-07-17 ENCOUNTER — DOCUMENTATION (OUTPATIENT)
Dept: FAMILY MEDICINE CLINIC | Facility: CLINIC | Age: 64
End: 2024-07-17
Payer: MEDICARE

## 2024-07-17 RX ORDER — INSULIN LISPRO 100 U/ML
INJECTION, SOLUTION SUBCUTANEOUS
COMMUNITY

## 2024-07-17 RX ORDER — HYDRALAZINE HYDROCHLORIDE 10 MG/1
10 TABLET, FILM COATED ORAL 3 TIMES DAILY
COMMUNITY

## 2024-07-17 RX ORDER — MECLIZINE HCL 25MG 25 MG/1
25 TABLET, CHEWABLE ORAL 3 TIMES DAILY PRN
COMMUNITY

## 2024-07-17 RX ORDER — ACETAMINOPHEN 500 MG
500 TABLET ORAL EVERY 6 HOURS PRN
COMMUNITY

## 2024-07-17 RX ORDER — SUCRALFATE 1 G/1
1 TABLET ORAL 4 TIMES DAILY
COMMUNITY

## 2024-07-17 RX ORDER — LIDOCAINE 4 G/G
1 PATCH TOPICAL 3 TIMES WEEKLY
COMMUNITY

## 2024-07-17 RX ORDER — AMMONIUM LACTATE 12 G/100G
1 LOTION TOPICAL 2 TIMES DAILY
COMMUNITY

## 2024-07-17 RX ORDER — FOLIC ACID/VIT B COMPLEX AND C 0.8 MG
1 TABLET ORAL DAILY
COMMUNITY

## 2024-07-17 RX ORDER — ONDANSETRON 4 MG/1
4 TABLET, FILM COATED ORAL EVERY 6 HOURS PRN
COMMUNITY

## 2024-07-17 RX ORDER — LOPERAMIDE HYDROCHLORIDE 2 MG/1
2 CAPSULE ORAL EVERY 4 HOURS PRN
COMMUNITY

## 2024-07-17 RX ORDER — PANTOPRAZOLE SODIUM 40 MG/1
40 TABLET, DELAYED RELEASE ORAL 2 TIMES DAILY
COMMUNITY

## 2024-09-10 ENCOUNTER — NURSING HOME (OUTPATIENT)
Dept: INTERNAL MEDICINE | Facility: CLINIC | Age: 64
End: 2024-09-10
Payer: MEDICARE

## 2024-09-10 VITALS
SYSTOLIC BLOOD PRESSURE: 124 MMHG | OXYGEN SATURATION: 97 % | RESPIRATION RATE: 17 BRPM | BODY MASS INDEX: 38.24 KG/M2 | TEMPERATURE: 97.5 F | HEART RATE: 114 BPM | WEIGHT: 236.9 LBS | DIASTOLIC BLOOD PRESSURE: 85 MMHG

## 2024-09-10 DIAGNOSIS — K21.01 GASTROESOPHAGEAL REFLUX DISEASE WITH ESOPHAGITIS AND HEMORRHAGE: Primary | ICD-10-CM

## 2024-09-10 DIAGNOSIS — U07.1 COVID-19 VIRUS INFECTION: ICD-10-CM

## 2024-09-10 DIAGNOSIS — I10 ESSENTIAL HYPERTENSION: ICD-10-CM

## 2024-09-10 DIAGNOSIS — E03.9 ACQUIRED HYPOTHYROIDISM: ICD-10-CM

## 2024-09-10 DIAGNOSIS — N18.6 ESRD (END STAGE RENAL DISEASE): ICD-10-CM

## 2024-09-10 DIAGNOSIS — D64.9 CHRONIC ANEMIA: ICD-10-CM

## 2024-09-10 DIAGNOSIS — E11.21 TYPE 2 DIABETES MELLITUS WITH NEPHROPATHY: ICD-10-CM

## 2024-09-10 DIAGNOSIS — I50.32 CHRONIC DIASTOLIC CONGESTIVE HEART FAILURE: ICD-10-CM

## 2024-09-10 DIAGNOSIS — R60.0 BILATERAL EDEMA OF LOWER EXTREMITY: ICD-10-CM

## 2024-09-10 PROCEDURE — 99308 SBSQ NF CARE LOW MDM 20: CPT | Performed by: INTERNAL MEDICINE

## 2024-09-10 NOTE — LETTER
Nursing Home Progress Note        Rafiq Baron DO   [address] [place] [address] Phone: [phone]  Fax: [phone]     PATIENT NAME: Millicent Mckeon                                                                          YOB: 1960           DATE OF SERVICE: 09/10/2024  FACILITY:  [facility]      CHIEF COMPLAINT:  Chronic Medical Management    History of Present Illness  The patient is a 64-year-old female being seen for routine medical management in the nursing facility.    She reports no recent issues with nausea or stomach acid. Her blood sugar levels have been stable and her weight has remained consistent. She experiences motion sickness during car rides, which was previously managed with meclizine during a trip to [place].       PAST MEDICAL & SURGICAL HISTORY:   Past Medical History:   Diagnosis Date    A-fib     Acute gastric ulcer without hemorrhage or perforation     Allergic     Anemia 10/02/2018    CHF (congestive heart failure)     Chronic respiratory failure with hypoxia     Chronic systolic (congestive) heart failure     Chronic venous hypertension involving both sides     Diabetes mellitus     Disease of thyroid gland     Edema of both lower extremities     End-stage renal disease on hemodialysis     Esophagitis     Gastroparesis     GERD (gastroesophageal reflux disease)     Gout     History of atrial flutter     History of transfusion     Hyperlipidemia     Hypertension     Hypothyroidism     Impaired functional mobility and activity tolerance     Impaired functional mobility, balance, gait, and endurance     Loss of consciousness     Lymphadenopathy     Morbid obesity     Mucopurulent chronic bronchitis     Nocturnal hypoxia     Obstructive sleep apnea syndrome     Pneumonia     Pressure ulcer of right heel, unstageable     Pulmonary hypertension     Secondary hyperparathyroidism     Stage 3 chronic kidney disease     Tachycardia-bradycardia syndrome     Type 2  diabetes mellitus with diabetic chronic kidney disease     Unspecified severe protein-calorie malnutrition     Vitamin B 12 deficiency       Past Surgical History:   Procedure Laterality Date    ENDOSCOPY N/A 5/2/2022    Procedure: ESOPHAGOGASTRODUODENOSCOPY WITH BIOPSY;  Surgeon: Jacob Chance MD;  Location: [facility] ENDOSCOPY;  Service: Gastroenterology;  Laterality: N/A;    EYE SURGERY      INCISION AND DRAINAGE LEG Right 1/14/2019    Procedure: Right heel incision and drainage with graft application;  Surgeon: Ar Rueda DPM;  Location: [facility] OR;  Service: Podiatry    INSERTION HEMODIALYSIS CATHETER N/A 4/5/2022    Procedure: HEMODIALYSIS CATHETER INSERTION;  Surgeon: Jean Carlos Guadalupe MD;  Location: [facility] OR;  Service: General;  Laterality: N/A;    LEG SURGERY           MEDICATIONS:  I have reviewed and reconciled the patients medication list in the patients chart at the [facility] nursing [facility] today, 09/10/2024.      ALLERGIES:  Allergies   Allergen Reactions    Metformin And Related Anaphylaxis     HA, diarrhea, throat swelling    Metformin GI Intolerance         SOCIAL HISTORY:  Social History     Socioeconomic History    Marital status: Single   Tobacco Use    Smoking status: Never    Smokeless tobacco: Never   Vaping Use    Vaping status: Never Used   Substance and Sexual Activity    Alcohol use: No    Drug use: No    Sexual activity: Defer       FAMILY HISTORY:  Family History   Problem Relation Age of Onset    Asthma Mother     Hypertension Father     Stroke Father        REVIEW OF SYSTEMS:  Review of Systems   Constitutional:  Negative for chills, fatigue and fever.   HENT:  Negative for congestion, ear pain, rhinorrhea, sinus pressure and sore throat.    Eyes:  Negative for visual disturbance.   Respiratory:  Negative for cough, chest tightness, shortness of breath and wheezing.    Cardiovascular:  Negative for chest pain, palpitations and leg swelling.   Gastrointestinal:  Negative  for abdominal pain, blood in stool, constipation, diarrhea, nausea and vomiting.   Endocrine: Negative for polydipsia and polyuria.   Genitourinary:  Negative for dysuria and hematuria.   Musculoskeletal:  Negative for arthralgias and back pain.   Skin:  Negative for rash.   Neurological:  Negative for dizziness, light-headedness, numbness and headaches.   Psychiatric/Behavioral:  Negative for dysphoric mood and sleep disturbance. The patient is not nervous/anxious.         PHYSICAL EXAMINATION:     VITAL SIGNS:  /85   Pulse 114   Temp 97.5 °F (36.4 °C)   Resp 17   Wt 107 kg (236 lb 14.4 oz)   SpO2 97%   BMI 38.24 kg/m²     Physical Exam  Vitals and nursing note reviewed.   Constitutional:       Appearance: Normal appearance. She is well-developed. She is obese.   HENT:      Head: Normocephalic and atraumatic.      Nose: Nose normal.      Mouth/Throat:      Mouth: Mucous membranes are moist.      Pharynx: No oropharyngeal exudate.   Eyes:      General: No scleral icterus.     Conjunctiva/sclera: Conjunctivae normal.      Pupils: Pupils are equal, round, and reactive to light.   Neck:      Thyroid: No thyromegaly.   Cardiovascular:      Rate and Rhythm: Normal rate. Rhythm irregular.      Heart sounds: Normal heart sounds. No murmur heard.     No friction rub. No gallop.   Pulmonary:      Effort: Pulmonary effort is normal. No respiratory distress.      Breath sounds: Normal breath sounds. No wheezing.   Abdominal:      General: Bowel sounds are normal. There is no distension.      Palpations: Abdomen is soft.      Tenderness: There is no abdominal tenderness.   Musculoskeletal:         General: No deformity or signs of injury.      Cervical back: Normal range of motion and neck supple.      Right lower leg: Edema present.      Left lower leg: Edema present.      Comments: Left upper extremity AV fistula    Lymphadenopathy:      Cervical: No cervical adenopathy.   Skin:     General: Skin is warm and dry.       Findings: No rash.   Neurological:      Mental Status: She is alert and oriented to person, place, and time.   Psychiatric:         Mood and Affect: Mood normal.         Behavior: Behavior normal.       RECORDS REVIEW:       ASSESSMENT     Diagnoses and all orders for this visit:    1. Gastroesophageal reflux disease with esophagitis and hemorrhage (Primary)    2. Essential hypertension    3. Acquired hypothyroidism    4. ESRD (end stage renal disease)    5. Type 2 diabetes mellitus with nephropathy    6. Chronic diastolic congestive heart failure    7. Chronic anemia    8. Bilateral edema of lower extremity    9. COVID-19 virus infection        Assessment & Plan        Gastroesophageal Reflux Disease (GERD).  She reports no significant issues with stomach acid recently. The decision was made to discontinue sucralfate. She can continue her PPI medication.     Motion Sickness.  She experiences nausea primarily during car rides. Zofran is available for use during transportation. Meclizine is also available for motion sickness if needed.    Anxiety  -Has been stable on Lexapro.     Type 2 diabetes mellitus with hypoglycemia  - Ozempic at current doses is working very well for the patient's glucose control.  -Continue Levemir 10 units daily with sliding scale.  Fortunately, she has not been requiring much sliding scale.    End-stage renal disease  - Continue hemodialysis as scheduled 3 times a week.  -CBC, CMP, A1c every 3 months.    Vaginal Bleeding  -Noted 2 years ago.  No signs of persistent bleeding since that time.     Chronic diarrhea  - Stable symptoms at this time.     Bilateral Lower Extremity Edema  -Fair control with elevation and compression     Acute blood loss anemia secondary to esophageal ulcers  -Cont iron supplements.  CBC being monitored by nephrology regularly.        Diabetic gastroparesis  -Stable recently.     Essential hypertension  - Stable controlled with current medication regimen.      Atrial fibrillation  - Stable control of rhythm.  Continue cardiac medications.  - Continue Eliquis.     Hypothyroidism  - Continue current dose of Synthroid.        [x]  Discussed Patient in detail with nursing/staff, addressed all needs today.     [x]  Plan of Care Reviewed   []  PT/OT Reviewed   []  Order Changes  []  Discharge Plans Reviewed  [x]  Advance Directive on file with Nursing Home.   [x]  POA on file with Nursing Home.    [x]  Code Status listed and reviewed.     I confirm accuracy of unchanged data/findings including physical exam and plan which have been carried forward from previous visit, as well as I have updated appropriately those that have changed        Rafiq Baron DO.  9/14/2024      Patient or patient representative verbalized consent for the use of Ambient Listening during the visit with  Rafiq Baron DO for chart documentation. 9/14/2024  08:36 EDT

## 2024-09-10 NOTE — PROGRESS NOTES
Nursing Home Progress Note        Rafiq Baron DO   [address] [place] [address] Phone: [phone]  Fax: [phone]     PATIENT NAME: Millicent Mckeon                                                                          YOB: 1960           DATE OF SERVICE: 09/10/2024  FACILITY:  [facility]      CHIEF COMPLAINT:  Chronic Medical Management    History of Present Illness  The patient is a 64-year-old female being seen for routine medical management in the nursing facility.    She reports no recent issues with nausea or stomach acid. Her blood sugar levels have been stable and her weight has remained consistent. She experiences motion sickness during car rides, which was previously managed with meclizine during a trip to [place].       PAST MEDICAL & SURGICAL HISTORY:   Past Medical History:   Diagnosis Date    A-fib     Acute gastric ulcer without hemorrhage or perforation     Allergic     Anemia 10/02/2018    CHF (congestive heart failure)     Chronic respiratory failure with hypoxia     Chronic systolic (congestive) heart failure     Chronic venous hypertension involving both sides     Diabetes mellitus     Disease of thyroid gland     Edema of both lower extremities     End-stage renal disease on hemodialysis     Esophagitis     Gastroparesis     GERD (gastroesophageal reflux disease)     Gout     History of atrial flutter     History of transfusion     Hyperlipidemia     Hypertension     Hypothyroidism     Impaired functional mobility and activity tolerance     Impaired functional mobility, balance, gait, and endurance     Loss of consciousness     Lymphadenopathy     Morbid obesity     Mucopurulent chronic bronchitis     Nocturnal hypoxia     Obstructive sleep apnea syndrome     Pneumonia     Pressure ulcer of right heel, unstageable     Pulmonary hypertension     Secondary hyperparathyroidism     Stage 3 chronic kidney disease     Tachycardia-bradycardia syndrome     Type 2  diabetes mellitus with diabetic chronic kidney disease     Unspecified severe protein-calorie malnutrition     Vitamin B 12 deficiency       Past Surgical History:   Procedure Laterality Date    ENDOSCOPY N/A 5/2/2022    Procedure: ESOPHAGOGASTRODUODENOSCOPY WITH BIOPSY;  Surgeon: Jacob Chance MD;  Location: [facility] ENDOSCOPY;  Service: Gastroenterology;  Laterality: N/A;    EYE SURGERY      INCISION AND DRAINAGE LEG Right 1/14/2019    Procedure: Right heel incision and drainage with graft application;  Surgeon: Ar Rueda DPM;  Location: [facility] OR;  Service: Podiatry    INSERTION HEMODIALYSIS CATHETER N/A 4/5/2022    Procedure: HEMODIALYSIS CATHETER INSERTION;  Surgeon: Jean Carlos Guadalupe MD;  Location: [facility] OR;  Service: General;  Laterality: N/A;    LEG SURGERY           MEDICATIONS:  I have reviewed and reconciled the patients medication list in the patients chart at the [facility] nursing [facility] today, 09/10/2024.      ALLERGIES:  Allergies   Allergen Reactions    Metformin And Related Anaphylaxis     HA, diarrhea, throat swelling    Metformin GI Intolerance         SOCIAL HISTORY:  Social History     Socioeconomic History    Marital status: Single   Tobacco Use    Smoking status: Never    Smokeless tobacco: Never   Vaping Use    Vaping status: Never Used   Substance and Sexual Activity    Alcohol use: No    Drug use: No    Sexual activity: Defer       FAMILY HISTORY:  Family History   Problem Relation Age of Onset    Asthma Mother     Hypertension Father     Stroke Father        REVIEW OF SYSTEMS:  Review of Systems   Constitutional:  Negative for chills, fatigue and fever.   HENT:  Negative for congestion, ear pain, rhinorrhea, sinus pressure and sore throat.    Eyes:  Negative for visual disturbance.   Respiratory:  Negative for cough, chest tightness, shortness of breath and wheezing.    Cardiovascular:  Negative for chest pain, palpitations and leg swelling.   Gastrointestinal:  Negative  for abdominal pain, blood in stool, constipation, diarrhea, nausea and vomiting.   Endocrine: Negative for polydipsia and polyuria.   Genitourinary:  Negative for dysuria and hematuria.   Musculoskeletal:  Negative for arthralgias and back pain.   Skin:  Negative for rash.   Neurological:  Negative for dizziness, light-headedness, numbness and headaches.   Psychiatric/Behavioral:  Negative for dysphoric mood and sleep disturbance. The patient is not nervous/anxious.         PHYSICAL EXAMINATION:     VITAL SIGNS:  /85   Pulse 114   Temp 97.5 °F (36.4 °C)   Resp 17   Wt 107 kg (236 lb 14.4 oz)   SpO2 97%   BMI 38.24 kg/m²     Physical Exam  Vitals and nursing note reviewed.   Constitutional:       Appearance: Normal appearance. She is well-developed. She is obese.   HENT:      Head: Normocephalic and atraumatic.      Nose: Nose normal.      Mouth/Throat:      Mouth: Mucous membranes are moist.      Pharynx: No oropharyngeal exudate.   Eyes:      General: No scleral icterus.     Conjunctiva/sclera: Conjunctivae normal.      Pupils: Pupils are equal, round, and reactive to light.   Neck:      Thyroid: No thyromegaly.   Cardiovascular:      Rate and Rhythm: Normal rate. Rhythm irregular.      Heart sounds: Normal heart sounds. No murmur heard.     No friction rub. No gallop.   Pulmonary:      Effort: Pulmonary effort is normal. No respiratory distress.      Breath sounds: Normal breath sounds. No wheezing.   Abdominal:      General: Bowel sounds are normal. There is no distension.      Palpations: Abdomen is soft.      Tenderness: There is no abdominal tenderness.   Musculoskeletal:         General: No deformity or signs of injury.      Cervical back: Normal range of motion and neck supple.      Right lower leg: Edema present.      Left lower leg: Edema present.      Comments: Left upper extremity AV fistula    Lymphadenopathy:      Cervical: No cervical adenopathy.   Skin:     General: Skin is warm and dry.       Findings: No rash.   Neurological:      Mental Status: She is alert and oriented to person, place, and time.   Psychiatric:         Mood and Affect: Mood normal.         Behavior: Behavior normal.       RECORDS REVIEW:       ASSESSMENT     Diagnoses and all orders for this visit:    1. Gastroesophageal reflux disease with esophagitis and hemorrhage (Primary)    2. Essential hypertension    3. Acquired hypothyroidism    4. ESRD (end stage renal disease)    5. Type 2 diabetes mellitus with nephropathy    6. Chronic diastolic congestive heart failure    7. Chronic anemia    8. Bilateral edema of lower extremity    9. COVID-19 virus infection        Assessment & Plan        Gastroesophageal Reflux Disease (GERD).  She reports no significant issues with stomach acid recently. The decision was made to discontinue sucralfate. She can continue her PPI medication.     Motion Sickness.  She experiences nausea primarily during car rides. Zofran is available for use during transportation. Meclizine is also available for motion sickness if needed.    Anxiety  -Has been stable on Lexapro.     Type 2 diabetes mellitus with hypoglycemia  - Ozempic at current doses is working very well for the patient's glucose control.  -Continue Levemir 10 units daily with sliding scale.  Fortunately, she has not been requiring much sliding scale.    End-stage renal disease  - Continue hemodialysis as scheduled 3 times a week.  -CBC, CMP, A1c every 3 months.    Vaginal Bleeding  -Noted 2 years ago.  No signs of persistent bleeding since that time.     Chronic diarrhea  - Stable symptoms at this time.     Bilateral Lower Extremity Edema  -Fair control with elevation and compression     Acute blood loss anemia secondary to esophageal ulcers  -Cont iron supplements.  CBC being monitored by nephrology regularly.        Diabetic gastroparesis  -Stable recently.     Essential hypertension  - Stable controlled with current medication regimen.      Atrial fibrillation  - Stable control of rhythm.  Continue cardiac medications.  - Continue Eliquis.     Hypothyroidism  - Continue current dose of Synthroid.        [x]  Discussed Patient in detail with nursing/staff, addressed all needs today.     [x]  Plan of Care Reviewed   []  PT/OT Reviewed   []  Order Changes  []  Discharge Plans Reviewed  [x]  Advance Directive on file with Nursing Home.   [x]  POA on file with Nursing Home.    [x]  Code Status listed and reviewed.     I confirm accuracy of unchanged data/findings including physical exam and plan which have been carried forward from previous visit, as well as I have updated appropriately those that have changed        Rafiq Baron DO.  9/14/2024      Patient or patient representative verbalized consent for the use of Ambient Listening during the visit with  Rafiq Baron DO for chart documentation. 9/14/2024  08:36 EDT

## 2024-11-11 NOTE — PROGRESS NOTES
Nursing Home Progress Note        Rafiq Baron DO   [address] [place] [address] Phone: [phone]  Fax: [phone]     PATIENT NAME: Millicent Mckeon                                                                          YOB: 1960           DATE OF SERVICE: 11/12/2024  FACILITY:  [facility]      CHIEF COMPLAINT:  Chronic Medical Management    History of Present Illness  Patient was seen today in the nursing facility for routine medical management.  On exam today, nurses did note that due to availability, patient's Ozempic was discontinued.  Her glucose levels have been running between 150 and 300.  Most glucose levels are running in the 200 range.  She continues to have insulin adjustments with her sliding scale.  She has not reported hypoglycemia and is tolerating her dialysis well.    Patient shares that her weight has come up a little bit however, she is okay with not continuing Ozempic.       PAST MEDICAL & SURGICAL HISTORY:   Past Medical History:   Diagnosis Date    A-fib     Acute gastric ulcer without hemorrhage or perforation     Allergic     Anemia 10/02/2018    CHF (congestive heart failure)     Chronic respiratory failure with hypoxia     Chronic systolic (congestive) heart failure     Chronic venous hypertension involving both sides     Diabetes mellitus     Disease of thyroid gland     Edema of both lower extremities     End-stage renal disease on hemodialysis     Esophagitis     Gastroparesis     GERD (gastroesophageal reflux disease)     Gout     History of atrial flutter     History of transfusion     Hyperlipidemia     Hypertension     Hypothyroidism     Impaired functional mobility and activity tolerance     Impaired functional mobility, balance, gait, and endurance     Loss of consciousness     Lymphadenopathy     Morbid obesity     Mucopurulent chronic bronchitis     Nocturnal hypoxia     Obstructive sleep apnea syndrome     Pneumonia     Pressure ulcer of  right heel, unstageable     Pulmonary hypertension     Secondary hyperparathyroidism     Stage 3 chronic kidney disease     Tachycardia-bradycardia syndrome     Type 2 diabetes mellitus with diabetic chronic kidney disease     Unspecified severe protein-calorie malnutrition     Vitamin B 12 deficiency       Past Surgical History:   Procedure Laterality Date    ENDOSCOPY N/A 5/2/2022    Procedure: ESOPHAGOGASTRODUODENOSCOPY WITH BIOPSY;  Surgeon: Jacob Chance MD;  Location: [facility] ENDOSCOPY;  Service: Gastroenterology;  Laterality: N/A;    EYE SURGERY      INCISION AND DRAINAGE LEG Right 1/14/2019    Procedure: Right heel incision and drainage with graft application;  Surgeon: Ar Rueda DPM;  Location: [facility] OR;  Service: Podiatry    INSERTION HEMODIALYSIS CATHETER N/A 4/5/2022    Procedure: HEMODIALYSIS CATHETER INSERTION;  Surgeon: Jean Carlos Guadalupe MD;  Location: [facility] OR;  Service: General;  Laterality: N/A;    LEG SURGERY           MEDICATIONS:  I have reviewed and reconciled the patients medication list in the patients chart at the [facility] nursing facility today, 11/12/2024.      ALLERGIES:  Allergies   Allergen Reactions    Metformin And Related Anaphylaxis     HA, diarrhea, throat swelling    Metformin GI Intolerance         SOCIAL HISTORY:  Social History     Socioeconomic History    Marital status: Single   Tobacco Use    Smoking status: Never    Smokeless tobacco: Never   Vaping Use    Vaping status: Never Used   Substance and Sexual Activity    Alcohol use: No    Drug use: No    Sexual activity: Defer       FAMILY HISTORY:  Family History   Problem Relation Age of Onset    Asthma Mother     Hypertension Father     Stroke Father        REVIEW OF SYSTEMS:  Review of Systems   Constitutional:  Negative for chills, fatigue and fever.   HENT:  Negative for congestion, ear pain, rhinorrhea, sinus pressure and sore throat.    Eyes:  Negative for visual disturbance.   Respiratory:  Negative  "for cough, chest tightness, shortness of breath and wheezing.    Cardiovascular:  Negative for chest pain, palpitations and leg swelling.   Gastrointestinal:  Negative for abdominal pain, blood in stool, constipation, diarrhea, nausea and vomiting.   Endocrine: Negative for polydipsia and polyuria.   Genitourinary:  Negative for dysuria and hematuria.   Musculoskeletal:  Negative for arthralgias and back pain.   Skin:  Negative for rash.   Neurological:  Negative for dizziness, light-headedness, numbness and headaches.   Psychiatric/Behavioral:  Negative for dysphoric mood and sleep disturbance. The patient is not nervous/anxious.         PHYSICAL EXAMINATION:     VITAL SIGNS:  /70   Pulse 103   Temp 97.7 °F (36.5 °C)   Resp 18   Ht 167.6 cm (66\")   Wt 108 kg (238 lb 14.4 oz)   SpO2 97%   BMI 38.56 kg/m²     Physical Exam  Vitals and nursing note reviewed.   Constitutional:       Appearance: Normal appearance. She is well-developed. She is obese.   HENT:      Head: Normocephalic and atraumatic.      Nose: Nose normal.      Mouth/Throat:      Mouth: Mucous membranes are moist.      Pharynx: No oropharyngeal exudate.   Eyes:      General: No scleral icterus.     Conjunctiva/sclera: Conjunctivae normal.      Pupils: Pupils are equal, round, and reactive to light.   Neck:      Thyroid: No thyromegaly.   Cardiovascular:      Rate and Rhythm: Normal rate. Rhythm irregular.      Heart sounds: Normal heart sounds. No murmur heard.     No friction rub. No gallop.   Pulmonary:      Effort: Pulmonary effort is normal. No respiratory distress.      Breath sounds: Normal breath sounds. No wheezing.   Abdominal:      General: Bowel sounds are normal. There is no distension.      Palpations: Abdomen is soft.      Tenderness: There is no abdominal tenderness.   Musculoskeletal:         General: No deformity or signs of injury.      Cervical back: Normal range of motion and neck supple.      Right lower leg: Edema " present.      Left lower leg: Edema present.      Comments: Left upper extremity AV fistula    Lymphadenopathy:      Cervical: No cervical adenopathy.   Skin:     General: Skin is warm and dry.      Findings: No rash.   Neurological:      Mental Status: She is alert and oriented to person, place, and time.   Psychiatric:         Mood and Affect: Mood normal.         Behavior: Behavior normal.       RECORDS REVIEW:       ASSESSMENT     Diagnoses and all orders for this visit:    1. Type 2 diabetes mellitus with nephropathy (Primary)    2. ESRD (end stage renal disease)    3. Class 2 severe obesity due to excess calories with serious comorbidity and body mass index (BMI) of 38.0 to 38.9 in adult    4. Motion sickness, subsequent encounter    5. Gastroesophageal reflux disease with esophagitis and hemorrhage    6. Essential hypertension    7. Acquired hypothyroidism    8. Chronic diastolic congestive heart failure        Assessment & Plan      Type 2 diabetes mellitus with hypoglycemia  -Continue Levemir  with sliding scale.  Consider adjustments based on A1C levels when reported next.    - pt remains off ozempic at this time.     End-stage renal disease  - Continue hemodialysis as scheduled 3 times a week.  -CBC, CMP, A1c every 3 months.    Obesity  - monitor weights with discontinuing of Ozempic.  Patient advised to reduce high calorie foods.     Gastroesophageal Reflux Disease (GERD).  - controlled with PPI     Motion Sickness / Gastroparesis  She experiences nausea primarily during car rides. Zofran is available for use during transportation. Meclizine is also available for motion sickness if needed.    Anxiety  -Has been stable on Lexapro.     Bilateral Lower Extremity Edema  -Fair control with elevation and compression     Acute blood loss anemia secondary to esophageal ulcers  -Cont iron supplements.  CBC being monitored by nephrology regularly.      History of chronic diarrhea  - less issues reported lately.      Essential hypertension  - Stable controlled with current medication regimen.     Atrial fibrillation  - Stable control of rhythm.  Continue cardiac medications.  - Continue Eliquis.     Hypothyroidism  - Continue current dose of Synthroid.  TSH is monitored every 3 months in facility.         [x]  Discussed Patient in detail with nursing/staff, addressed all needs today.     [x]  Plan of Care Reviewed   []  PT/OT Reviewed   [x]  Order Changes  []  Discharge Plans Reviewed  [x]  Advance Directive on file with Nursing Home.   [x]  POA on file with Nursing Home.    [x]  Code Status listed and reviewed.     I confirm accuracy of unchanged data/findings including physical exam and plan which have been carried forward from previous visit, as well as I have updated appropriately those that have changed        Rafiq Baron DO.  11/18/2024      Patient or patient representative verbalized consent for the use of Ambient Listening during the visit with  Rafiq Baron DO for chart documentation. 11/18/2024  16:59 EST

## 2024-11-12 ENCOUNTER — NURSING HOME (OUTPATIENT)
Dept: INTERNAL MEDICINE | Facility: CLINIC | Age: 64
End: 2024-11-12
Payer: MEDICARE

## 2024-11-12 VITALS
HEART RATE: 103 BPM | BODY MASS INDEX: 38.39 KG/M2 | HEIGHT: 66 IN | RESPIRATION RATE: 18 BRPM | OXYGEN SATURATION: 97 % | TEMPERATURE: 97.7 F | SYSTOLIC BLOOD PRESSURE: 125 MMHG | WEIGHT: 238.9 LBS | DIASTOLIC BLOOD PRESSURE: 70 MMHG

## 2024-11-12 DIAGNOSIS — E66.812 CLASS 2 SEVERE OBESITY DUE TO EXCESS CALORIES WITH SERIOUS COMORBIDITY AND BODY MASS INDEX (BMI) OF 38.0 TO 38.9 IN ADULT: ICD-10-CM

## 2024-11-12 DIAGNOSIS — E03.9 ACQUIRED HYPOTHYROIDISM: ICD-10-CM

## 2024-11-12 DIAGNOSIS — E66.01 CLASS 2 SEVERE OBESITY DUE TO EXCESS CALORIES WITH SERIOUS COMORBIDITY AND BODY MASS INDEX (BMI) OF 38.0 TO 38.9 IN ADULT: ICD-10-CM

## 2024-11-12 DIAGNOSIS — I10 ESSENTIAL HYPERTENSION: ICD-10-CM

## 2024-11-12 DIAGNOSIS — N18.6 ESRD (END STAGE RENAL DISEASE): ICD-10-CM

## 2024-11-12 DIAGNOSIS — E11.21 TYPE 2 DIABETES MELLITUS WITH NEPHROPATHY: Primary | ICD-10-CM

## 2024-11-12 DIAGNOSIS — I50.32 CHRONIC DIASTOLIC CONGESTIVE HEART FAILURE: ICD-10-CM

## 2024-11-12 DIAGNOSIS — K21.01 GASTROESOPHAGEAL REFLUX DISEASE WITH ESOPHAGITIS AND HEMORRHAGE: ICD-10-CM

## 2024-11-12 DIAGNOSIS — T75.3XXD MOTION SICKNESS, SUBSEQUENT ENCOUNTER: ICD-10-CM

## 2024-11-12 PROCEDURE — 99308 SBSQ NF CARE LOW MDM 20: CPT | Performed by: INTERNAL MEDICINE

## 2024-11-12 NOTE — LETTER
Nursing Home Progress Note        Rafiq Baron DO   [address] [place] [address] Phone: [phone]  Fax: [phone]     PATIENT NAME: Millicent Mckeon                                                                          YOB: 1960           DATE OF SERVICE: 11/12/2024  FACILITY:  [facility]      CHIEF COMPLAINT:  Chronic Medical Management    History of Present Illness  Patient was seen today in the nursing facility for routine medical management.  On exam today, nurses did note that due to availability, patient's Ozempic was discontinued.  Her glucose levels have been running between 150 and 300.  Most glucose levels are running in the 200 range.  She continues to have insulin adjustments with her sliding scale.  She has not reported hypoglycemia and is tolerating her dialysis well.    Patient shares that her weight has come up a little bit however, she is okay with not continuing Ozempic.       PAST MEDICAL & SURGICAL HISTORY:   Past Medical History:   Diagnosis Date   • A-fib    • Acute gastric ulcer without hemorrhage or perforation    • Allergic    • Anemia 10/02/2018   • CHF (congestive heart failure)    • Chronic respiratory failure with hypoxia    • Chronic systolic (congestive) heart failure    • Chronic venous hypertension involving both sides    • Diabetes mellitus    • Disease of thyroid gland    • Edema of both lower extremities    • End-stage renal disease on hemodialysis    • Esophagitis    • Gastroparesis    • GERD (gastroesophageal reflux disease)    • Gout    • History of atrial flutter    • History of transfusion    • Hyperlipidemia    • Hypertension    • Hypothyroidism    • Impaired functional mobility and activity tolerance    • Impaired functional mobility, balance, gait, and endurance    • Loss of consciousness    • Lymphadenopathy    • Morbid obesity    • Mucopurulent chronic bronchitis    • Nocturnal hypoxia    • Obstructive sleep apnea syndrome    •  Pneumonia    • Pressure ulcer of right heel, unstageable    • Pulmonary hypertension    • Secondary hyperparathyroidism    • Stage 3 chronic kidney disease    • Tachycardia-bradycardia syndrome    • Type 2 diabetes mellitus with diabetic chronic kidney disease    • Unspecified severe protein-calorie malnutrition    • Vitamin B 12 deficiency       Past Surgical History:   Procedure Laterality Date   • ENDOSCOPY N/A 5/2/2022    Procedure: ESOPHAGOGASTRODUODENOSCOPY WITH BIOPSY;  Surgeon: Jacob Chance MD;  Location: [facility] ENDOSCOPY;  Service: Gastroenterology;  Laterality: N/A;   • EYE SURGERY     • INCISION AND DRAINAGE LEG Right 1/14/2019    Procedure: Right heel incision and drainage with graft application;  Surgeon: Ar Rueda DPM;  Location: [facility] OR;  Service: Podiatry   • INSERTION HEMODIALYSIS CATHETER N/A 4/5/2022    Procedure: HEMODIALYSIS CATHETER INSERTION;  Surgeon: Jean Carlos Guadalupe MD;  Location: [facility] OR;  Service: General;  Laterality: N/A;   • LEG SURGERY           MEDICATIONS:  I have reviewed and reconciled the patients medication list in the patients chart at the skilled nursing facility today, 11/12/2024.      ALLERGIES:  Allergies   Allergen Reactions   • Metformin And Related Anaphylaxis     HA, diarrhea, throat swelling   • Metformin GI Intolerance         SOCIAL HISTORY:  Social History     Socioeconomic History   • Marital status: Single   Tobacco Use   • Smoking status: Never   • Smokeless tobacco: Never   Vaping Use   • Vaping status: Never Used   Substance and Sexual Activity   • Alcohol use: No   • Drug use: No   • Sexual activity: Defer       FAMILY HISTORY:  Family History   Problem Relation Age of Onset   • Asthma Mother    • Hypertension Father    • Stroke Father        REVIEW OF SYSTEMS:  Review of Systems   Constitutional:  Negative for chills, fatigue and fever.   HENT:  Negative for congestion, ear pain, rhinorrhea, sinus pressure and sore throat.    Eyes:   "Negative for visual disturbance.   Respiratory:  Negative for cough, chest tightness, shortness of breath and wheezing.    Cardiovascular:  Negative for chest pain, palpitations and leg swelling.   Gastrointestinal:  Negative for abdominal pain, blood in stool, constipation, diarrhea, nausea and vomiting.   Endocrine: Negative for polydipsia and polyuria.   Genitourinary:  Negative for dysuria and hematuria.   Musculoskeletal:  Negative for arthralgias and back pain.   Skin:  Negative for rash.   Neurological:  Negative for dizziness, light-headedness, numbness and headaches.   Psychiatric/Behavioral:  Negative for dysphoric mood and sleep disturbance. The patient is not nervous/anxious.         PHYSICAL EXAMINATION:     VITAL SIGNS:  /70   Pulse 103   Temp 97.7 °F (36.5 °C)   Resp 18   Ht 167.6 cm (66\")   Wt 108 kg (238 lb 14.4 oz)   SpO2 97%   BMI 38.56 kg/m²     Physical Exam  Vitals and nursing note reviewed.   Constitutional:       Appearance: Normal appearance. She is well-developed. She is obese.   HENT:      Head: Normocephalic and atraumatic.      Nose: Nose normal.      Mouth/Throat:      Mouth: Mucous membranes are moist.      Pharynx: No oropharyngeal exudate.   Eyes:      General: No scleral icterus.     Conjunctiva/sclera: Conjunctivae normal.      Pupils: Pupils are equal, round, and reactive to light.   Neck:      Thyroid: No thyromegaly.   Cardiovascular:      Rate and Rhythm: Normal rate. Rhythm irregular.      Heart sounds: Normal heart sounds. No murmur heard.     No friction rub. No gallop.   Pulmonary:      Effort: Pulmonary effort is normal. No respiratory distress.      Breath sounds: Normal breath sounds. No wheezing.   Abdominal:      General: Bowel sounds are normal. There is no distension.      Palpations: Abdomen is soft.      Tenderness: There is no abdominal tenderness.   Musculoskeletal:         General: No deformity or signs of injury.      Cervical back: Normal range of " motion and neck supple.      Right lower leg: Edema present.      Left lower leg: Edema present.      Comments: Left upper extremity AV fistula    Lymphadenopathy:      Cervical: No cervical adenopathy.   Skin:     General: Skin is warm and dry.      Findings: No rash.   Neurological:      Mental Status: She is alert and oriented to person, place, and time.   Psychiatric:         Mood and Affect: Mood normal.         Behavior: Behavior normal.       RECORDS REVIEW:       ASSESSMENT     Diagnoses and all orders for this visit:    1. Type 2 diabetes mellitus with nephropathy (Primary)    2. ESRD (end stage renal disease)    3. Class 2 severe obesity due to excess calories with serious comorbidity and body mass index (BMI) of 38.0 to 38.9 in adult    4. Motion sickness, subsequent encounter    5. Gastroesophageal reflux disease with esophagitis and hemorrhage    6. Essential hypertension    7. Acquired hypothyroidism    8. Chronic diastolic congestive heart failure        Assessment & Plan      Type 2 diabetes mellitus with hypoglycemia  -Continue Levemir  with sliding scale.  Consider adjustments based on A1C levels when reported next.    - pt remains off ozempic at this time.     End-stage renal disease  - Continue hemodialysis as scheduled 3 times a week.  -CBC, CMP, A1c every 3 months.    Obesity  - monitor weights with discontinuing of Ozempic.  Patient advised to reduce high calorie foods.     Gastroesophageal Reflux Disease (GERD).  - controlled with PPI     Motion Sickness / Gastroparesis  She experiences nausea primarily during car rides. Zofran is available for use during transportation. Meclizine is also available for motion sickness if needed.    Anxiety  -Has been stable on Lexapro.     Bilateral Lower Extremity Edema  -Fair control with elevation and compression     Acute blood loss anemia secondary to esophageal ulcers  -Cont iron supplements.  CBC being monitored by nephrology regularly.      History of  chronic diarrhea  - less issues reported lately.     Essential hypertension  - Stable controlled with current medication regimen.     Atrial fibrillation  - Stable control of rhythm.  Continue cardiac medications.  - Continue Eliquis.     Hypothyroidism  - Continue current dose of Synthroid.  TSH is monitored every 3 months in facility.         [x]  Discussed Patient in detail with nursing/staff, addressed all needs today.     [x]  Plan of Care Reviewed   []  PT/OT Reviewed   [x]  Order Changes  []  Discharge Plans Reviewed  [x]  Advance Directive on file with Nursing Home.   [x]  POA on file with Nursing Home.    [x]  Code Status listed and reviewed.     I confirm accuracy of unchanged data/findings including physical exam and plan which have been carried forward from previous visit, as well as I have updated appropriately those that have changed        Rafiq Baron DO.  11/18/2024      Patient or patient representative verbalized consent for the use of Ambient Listening during the visit with  Rafiq Baron DO for chart documentation. 11/18/2024  16:59 EST

## 2024-12-05 ENCOUNTER — NURSING HOME (OUTPATIENT)
Dept: FAMILY MEDICINE CLINIC | Facility: CLINIC | Age: 64
End: 2024-12-05
Payer: MEDICARE

## 2024-12-05 DIAGNOSIS — F39 MOOD DISORDER: ICD-10-CM

## 2024-12-05 DIAGNOSIS — R46.89 BEHAVIOR CONCERN: ICD-10-CM

## 2024-12-05 DIAGNOSIS — Z78.9 IMPAIRED MOBILITY AND ADLS: ICD-10-CM

## 2024-12-05 DIAGNOSIS — F41.8 OTHER SPECIFIED ANXIETY DISORDERS: ICD-10-CM

## 2024-12-05 DIAGNOSIS — Z74.09 IMPAIRED MOBILITY AND ADLS: ICD-10-CM

## 2024-12-05 NOTE — LETTER
Nursing Home Follow Up Note      Praful Archuleta DO []   SNEHA Salmon [x]  [address] [place] [address]  Phone: [phone]  Fax: [phone] Beto Aguayo MD []    Rafiq Baron DO []   [address] [place] [address]  Phone: [phone]  Fax: [phone]     PATIENT NAME: Millicent Mckeon                                                                          YOB: 1960           DATE OF SERVICE: 12/5/2024  FACILITY:  [x][place]   [] [place]   [] [facility]   [] [facility]   [] Other ______________________________________________________________________      CHIEF COMPLAINT:    Behaviors at dialysis clinic yesterday.       HISTORY OF PRESENT ILLNESS:     Dr Leone contacted Dr Major yesterday to report that patient had behaviors during her dialysis session. He requested that her medications be restarted that had been GDR'd a few months ago. She was on Lexapro and Seroquel for anxiety and mood disorder. Per nursing, she has not had any behaviors in the facility, and dialysis clinic did not report any behaviors. Very pleasant today during visit. Moving about her room in wheelchair.     PAST MEDICAL & SURGICAL HISTORY:   Past Medical History:   Diagnosis Date   • A-fib    • Acute gastric ulcer without hemorrhage or perforation    • Allergic    • Anemia 10/02/2018   • CHF (congestive heart failure)    • Chronic respiratory failure with hypoxia    • Chronic systolic (congestive) heart failure    • Chronic venous hypertension involving both sides    • Diabetes mellitus    • Disease of thyroid gland    • Edema of both lower extremities    • End-stage renal disease on hemodialysis    • Esophagitis    • Gastroparesis    • GERD (gastroesophageal reflux disease)    • Gout    • History of atrial flutter    • History of transfusion    • Hyperlipidemia    • Hypertension    • Hypothyroidism    • Impaired functional mobility and activity tolerance    • Impaired functional  mobility, balance, gait, and endurance    • Loss of consciousness    • Lymphadenopathy    • Morbid obesity    • Mucopurulent chronic bronchitis    • Nocturnal hypoxia    • Obstructive sleep apnea syndrome    • Pneumonia    • Pressure ulcer of right heel, unstageable    • Pulmonary hypertension    • Secondary hyperparathyroidism    • Stage 3 chronic kidney disease    • Tachycardia-bradycardia syndrome    • Type 2 diabetes mellitus with diabetic chronic kidney disease    • Unspecified severe protein-calorie malnutrition    • Vitamin B 12 deficiency       Past Surgical History:   Procedure Laterality Date   • ENDOSCOPY N/A 5/2/2022    Procedure: ESOPHAGOGASTRODUODENOSCOPY WITH BIOPSY;  Surgeon: Jacob Chance MD;  Location: [facility] ENDOSCOPY;  Service: Gastroenterology;  Laterality: N/A;   • EYE SURGERY     • INCISION AND DRAINAGE LEG Right 1/14/2019    Procedure: Right heel incision and drainage with graft application;  Surgeon: Ar Rueda DPM;  Location: [facility] OR;  Service: Podiatry   • INSERTION HEMODIALYSIS CATHETER N/A 4/5/2022    Procedure: HEMODIALYSIS CATHETER INSERTION;  Surgeon: Jean Carlos Guadalupe MD;  Location: [facility] OR;  Service: General;  Laterality: N/A;   • LEG SURGERY           MEDICATIONS:  I have reviewed and reconciled the patients medication list in the patients chart at the skilled nursing facility today.      ALLERGIES:    Allergies   Allergen Reactions   • Metformin And Related Anaphylaxis     HA, diarrhea, throat swelling   • Metformin GI Intolerance         SOCIAL HISTORY:    Social History     Socioeconomic History   • Marital status: Single   Tobacco Use   • Smoking status: Never   • Smokeless tobacco: Never   Vaping Use   • Vaping status: Never Used   Substance and Sexual Activity   • Alcohol use: No   • Drug use: No   • Sexual activity: Defer       FAMILY HISTORY:    Family History   Problem Relation Age of Onset   • Asthma Mother    • Hypertension Father    • Stroke Father   "      REVIEW OF SYSTEMS:    Review of Systems   Constitutional:  Negative for activity change, appetite change, chills, diaphoresis, fatigue and fever.   HENT:  Negative for congestion, ear pain, nosebleeds, postnasal drip, sore throat and voice change.    Eyes:  Negative for redness and visual disturbance.   Respiratory:  Negative for cough, chest tightness, shortness of breath and wheezing.    Cardiovascular:  Negative for chest pain, palpitations and leg swelling.   Gastrointestinal:  Negative for abdominal distention, abdominal pain, blood in stool, constipation, diarrhea, nausea, vomiting and indigestion.   Genitourinary:  Negative for dysuria, flank pain, frequency, hematuria and urinary incontinence.        Dialysis   Musculoskeletal:  Negative for arthralgias, gait problem and myalgias.   Skin:  Negative for color change and rash.   Neurological:  Negative for dizziness, seizures, speech difficulty, weakness, headache, memory problem and confusion.   Psychiatric/Behavioral:  Positive for behavioral problems. Negative for dysphoric mood, hallucinations, sleep disturbance and depressed mood. The patient is not nervous/anxious.          PHYSICAL EXAMINATION:   VITAL SIGNS:   Vitals:    12/05/24 0859   BP: 135/70   Pulse: 96   Resp: 18   Temp: 97.6 °F (36.4 °C)   SpO2: 92%   Weight: 109 kg (239 lb 9.6 oz)   Height: 167.6 cm (66\")        Physical Exam  Vitals reviewed.   Constitutional:       Appearance: Normal appearance.   HENT:      Mouth/Throat:      Comments: PND  Eyes:      Conjunctiva/sclera: Conjunctivae normal.   Cardiovascular:      Rate and Rhythm: Normal rate and regular rhythm.   Pulmonary:      Effort: Pulmonary effort is normal.      Breath sounds: Normal breath sounds.   Abdominal:      General: Bowel sounds are normal. There is no distension.      Palpations: Abdomen is soft.   Skin:     General: Skin is warm.   Neurological:      General: No focal deficit present.      Mental Status: She is " alert and oriented to person, place, and time.   Psychiatric:         Mood and Affect: Mood and affect normal.         Behavior: Behavior normal.         Cognition and Memory: Cognition and memory normal.         RECORDS REVIEW:   I have reviewed records in EMR    ASSESSMENT     Diagnoses and all orders for this visit:    1. Other specified anxiety disorders    2. Mood disorder    3. Behavior concern    4. Impaired mobility and ADLs        PLAN    Anxiety and mood disorder/behavior concern  -Restart Lexapro 10 mg daily and Seroquel 12.5 mg qhs. Will follow up and monitor.     Patient and nursing encouraged to keep me informed of any acute changes, lack of improvement, or any new concerning symptoms.     Continue supportive care for all ADLs    Will follow-up and continue to monitor.    [x]  Discussed Patient in detail with nursing/staff, addressed all needs today.     [x]  Plan of Care Reviewed   [x]  PT/OT Reviewed   []  Order Changes  [x]  Discharge Plans Reviewed  [x]  Advance Directive on file with Nursing Home.   [x]  POA on file with Nursing Home.   [x]  Code Status listed: []  Full Code   []  DNR     I spent 30 minutes caring for Millicent on this date of service. This time includes time spent by me in the following activities:preparing for the visit, obtaining and/or reviewing a separately obtained history, performing a medically appropriate examination and/or evaluation , counseling and educating the patient/family/caregiver, ordering medications, tests, or procedures, referring and communicating with other health care professionals , documenting information in the medical record, independently interpreting results and communicating that information with the patient/family/caregiver, and care coordination    I confirm accuracy of unchanged data/findings which have been carried forward from previous visit, as well as I have updated appropriately those that have changed.     Sidra Stevens, APRN.

## 2024-12-06 VITALS
HEART RATE: 96 BPM | SYSTOLIC BLOOD PRESSURE: 135 MMHG | DIASTOLIC BLOOD PRESSURE: 70 MMHG | TEMPERATURE: 97.6 F | WEIGHT: 239.6 LBS | HEIGHT: 66 IN | RESPIRATION RATE: 18 BRPM | BODY MASS INDEX: 38.51 KG/M2 | OXYGEN SATURATION: 92 %

## 2024-12-06 NOTE — PROGRESS NOTES
Nursing Home Follow Up Note      Praful Archuleta DO []   SNEHA Salmon [x]  [address] [place] [address]  Phone: [phone]  Fax: [phone] Beto Aguayo MD []    Rafiq Baron DO []   [address] [place] [address]  Phone: [phone]  Fax: [phone]     PATIENT NAME: Millicent Mckeon                                                                          YOB: 1960           DATE OF SERVICE: 12/5/2024  FACILITY:  [x][place]   [] [place]   [] [facility]   [] [facility]   [] Other ______________________________________________________________________      CHIEF COMPLAINT:    Behaviors at dialysis clinic yesterday.       HISTORY OF PRESENT ILLNESS:     Dr Leone contacted Dr Major yesterday to report that patient had behaviors during her dialysis session. He requested that her medications be restarted that had been GDR'd a few months ago. She was on Lexapro and Seroquel for anxiety and mood disorder. Per nursing, she has not had any behaviors in the facility, and dialysis clinic did not report any behaviors. Very pleasant today during visit. Moving about her room in wheelchair.     PAST MEDICAL & SURGICAL HISTORY:   Past Medical History:   Diagnosis Date    A-fib     Acute gastric ulcer without hemorrhage or perforation     Allergic     Anemia 10/02/2018    CHF (congestive heart failure)     Chronic respiratory failure with hypoxia     Chronic systolic (congestive) heart failure     Chronic venous hypertension involving both sides     Diabetes mellitus     Disease of thyroid gland     Edema of both lower extremities     End-stage renal disease on hemodialysis     Esophagitis     Gastroparesis     GERD (gastroesophageal reflux disease)     Gout     History of atrial flutter     History of transfusion     Hyperlipidemia     Hypertension     Hypothyroidism     Impaired functional mobility and activity tolerance     Impaired functional mobility, balance, gait, and  endurance     Loss of consciousness     Lymphadenopathy     Morbid obesity     Mucopurulent chronic bronchitis     Nocturnal hypoxia     Obstructive sleep apnea syndrome     Pneumonia     Pressure ulcer of right heel, unstageable     Pulmonary hypertension     Secondary hyperparathyroidism     Stage 3 chronic kidney disease     Tachycardia-bradycardia syndrome     Type 2 diabetes mellitus with diabetic chronic kidney disease     Unspecified severe protein-calorie malnutrition     Vitamin B 12 deficiency       Past Surgical History:   Procedure Laterality Date    ENDOSCOPY N/A 5/2/2022    Procedure: ESOPHAGOGASTRODUODENOSCOPY WITH BIOPSY;  Surgeon: Jacob Chance MD;  Location: [facility] ENDOSCOPY;  Service: Gastroenterology;  Laterality: N/A;    EYE SURGERY      INCISION AND DRAINAGE LEG Right 1/14/2019    Procedure: Right heel incision and drainage with graft application;  Surgeon: Ar Rueda DPM;  Location: [facility] OR;  Service: Podiatry    INSERTION HEMODIALYSIS CATHETER N/A 4/5/2022    Procedure: HEMODIALYSIS CATHETER INSERTION;  Surgeon: Jean Carlos Guadalupe MD;  Location: [facility] OR;  Service: General;  Laterality: N/A;    LEG SURGERY           MEDICATIONS:  I have reviewed and reconciled the patients medication list in the patients chart at the skilled nursing facility today.      ALLERGIES:    Allergies   Allergen Reactions    Metformin And Related Anaphylaxis     HA, diarrhea, throat swelling    Metformin GI Intolerance         SOCIAL HISTORY:    Social History     Socioeconomic History    Marital status: Single   Tobacco Use    Smoking status: Never    Smokeless tobacco: Never   Vaping Use    Vaping status: Never Used   Substance and Sexual Activity    Alcohol use: No    Drug use: No    Sexual activity: Defer       FAMILY HISTORY:    Family History   Problem Relation Age of Onset    Asthma Mother     Hypertension Father     Stroke Father        REVIEW OF SYSTEMS:    Review of Systems  "  Constitutional:  Negative for activity change, appetite change, chills, diaphoresis, fatigue and fever.   HENT:  Negative for congestion, ear pain, nosebleeds, postnasal drip, sore throat and voice change.    Eyes:  Negative for redness and visual disturbance.   Respiratory:  Negative for cough, chest tightness, shortness of breath and wheezing.    Cardiovascular:  Negative for chest pain, palpitations and leg swelling.   Gastrointestinal:  Negative for abdominal distention, abdominal pain, blood in stool, constipation, diarrhea, nausea, vomiting and indigestion.   Genitourinary:  Negative for dysuria, flank pain, frequency, hematuria and urinary incontinence.        Dialysis   Musculoskeletal:  Negative for arthralgias, gait problem and myalgias.   Skin:  Negative for color change and rash.   Neurological:  Negative for dizziness, seizures, speech difficulty, weakness, headache, memory problem and confusion.   Psychiatric/Behavioral:  Positive for behavioral problems. Negative for dysphoric mood, hallucinations, sleep disturbance and depressed mood. The patient is not nervous/anxious.          PHYSICAL EXAMINATION:   VITAL SIGNS:   Vitals:    12/05/24 0859   BP: 135/70   Pulse: 96   Resp: 18   Temp: 97.6 °F (36.4 °C)   SpO2: 92%   Weight: 109 kg (239 lb 9.6 oz)   Height: 167.6 cm (66\")        Physical Exam  Vitals reviewed.   Constitutional:       Appearance: Normal appearance.   HENT:      Mouth/Throat:      Comments: PND  Eyes:      Conjunctiva/sclera: Conjunctivae normal.   Cardiovascular:      Rate and Rhythm: Normal rate and regular rhythm.   Pulmonary:      Effort: Pulmonary effort is normal.      Breath sounds: Normal breath sounds.   Abdominal:      General: Bowel sounds are normal. There is no distension.      Palpations: Abdomen is soft.   Skin:     General: Skin is warm.   Neurological:      General: No focal deficit present.      Mental Status: She is alert and oriented to person, place, and time. "   Psychiatric:         Mood and Affect: Mood and affect normal.         Behavior: Behavior normal.         Cognition and Memory: Cognition and memory normal.         RECORDS REVIEW:   I have reviewed records in EMR    ASSESSMENT     Diagnoses and all orders for this visit:    1. Other specified anxiety disorders    2. Mood disorder    3. Behavior concern    4. Impaired mobility and ADLs        PLAN    Anxiety and mood disorder/behavior concern  -Restart Lexapro 10 mg daily and Seroquel 12.5 mg qhs. Will follow up and monitor.     Patient and nursing encouraged to keep me informed of any acute changes, lack of improvement, or any new concerning symptoms.     Continue supportive care for all ADLs    Will follow-up and continue to monitor.    [x]  Discussed Patient in detail with nursing/staff, addressed all needs today.     [x]  Plan of Care Reviewed   [x]  PT/OT Reviewed   []  Order Changes  [x]  Discharge Plans Reviewed  [x]  Advance Directive on file with Nursing Home.   [x]  POA on file with Nursing Home.   [x]  Code Status listed: []  Full Code   []  DNR     I spent 30 minutes caring for Millicent on this date of service. This time includes time spent by me in the following activities:preparing for the visit, obtaining and/or reviewing a separately obtained history, performing a medically appropriate examination and/or evaluation , counseling and educating the patient/family/caregiver, ordering medications, tests, or procedures, referring and communicating with other health care professionals , documenting information in the medical record, independently interpreting results and communicating that information with the patient/family/caregiver, and care coordination    I confirm accuracy of unchanged data/findings which have been carried forward from previous visit, as well as I have updated appropriately those that have changed.     Sidra Stevens, APRN.

## 2024-12-24 ENCOUNTER — HOSPITAL ENCOUNTER (EMERGENCY)
Facility: HOSPITAL | Age: 64
Discharge: NURSING, REHAB OR LONG-TERM CARE | End: 2024-12-24
Attending: EMERGENCY MEDICINE | Admitting: EMERGENCY MEDICINE
Payer: MEDICARE

## 2024-12-24 ENCOUNTER — APPOINTMENT (OUTPATIENT)
Dept: CT IMAGING | Facility: HOSPITAL | Age: 64
End: 2024-12-24
Payer: MEDICARE

## 2024-12-24 ENCOUNTER — APPOINTMENT (OUTPATIENT)
Dept: GENERAL RADIOLOGY | Facility: HOSPITAL | Age: 64
End: 2024-12-24
Payer: MEDICARE

## 2024-12-24 VITALS
HEART RATE: 108 BPM | WEIGHT: 240.3 LBS | RESPIRATION RATE: 18 BRPM | SYSTOLIC BLOOD PRESSURE: 143 MMHG | HEIGHT: 66 IN | DIASTOLIC BLOOD PRESSURE: 89 MMHG | OXYGEN SATURATION: 100 % | BODY MASS INDEX: 38.62 KG/M2 | TEMPERATURE: 97.6 F

## 2024-12-24 DIAGNOSIS — M54.2 NECK PAIN: ICD-10-CM

## 2024-12-24 DIAGNOSIS — R55 SYNCOPE, UNSPECIFIED SYNCOPE TYPE: Primary | ICD-10-CM

## 2024-12-24 DIAGNOSIS — Z99.2 DIALYSIS PATIENT: ICD-10-CM

## 2024-12-24 DIAGNOSIS — M25.511 ACUTE PAIN OF RIGHT SHOULDER: ICD-10-CM

## 2024-12-24 LAB
ALBUMIN SERPL-MCNC: 4.6 G/DL (ref 3.5–5.2)
ALBUMIN/GLOB SERPL: 1.2 G/DL
ALP SERPL-CCNC: 181 U/L (ref 39–117)
ALT SERPL W P-5'-P-CCNC: 8 U/L (ref 1–33)
ANION GAP SERPL CALCULATED.3IONS-SCNC: 21.1 MMOL/L (ref 5–15)
AST SERPL-CCNC: 19 U/L (ref 1–32)
BASOPHILS # BLD AUTO: 0.08 10*3/MM3 (ref 0–0.2)
BASOPHILS NFR BLD AUTO: 1.1 % (ref 0–1.5)
BILIRUB SERPL-MCNC: 0.5 MG/DL (ref 0–1.2)
BUN SERPL-MCNC: 22 MG/DL (ref 8–23)
BUN/CREAT SERPL: 5.2 (ref 7–25)
CALCIUM SPEC-SCNC: 10.1 MG/DL (ref 8.6–10.5)
CHLORIDE SERPL-SCNC: 86 MMOL/L (ref 98–107)
CO2 SERPL-SCNC: 25.9 MMOL/L (ref 22–29)
CREAT SERPL-MCNC: 4.26 MG/DL (ref 0.57–1)
DEPRECATED RDW RBC AUTO: 49.5 FL (ref 37–54)
EGFRCR SERPLBLD CKD-EPI 2021: 10.9 ML/MIN/1.73
EOSINOPHIL # BLD AUTO: 0.3 10*3/MM3 (ref 0–0.4)
EOSINOPHIL NFR BLD AUTO: 4.1 % (ref 0.3–6.2)
ERYTHROCYTE [DISTWIDTH] IN BLOOD BY AUTOMATED COUNT: 15.5 % (ref 12.3–15.4)
FLUAV RNA RESP QL NAA+PROBE: NOT DETECTED
FLUBV RNA RESP QL NAA+PROBE: NOT DETECTED
GEN 5 1HR TROPONIN T REFLEX: 55 NG/L
GLOBULIN UR ELPH-MCNC: 3.8 GM/DL
GLUCOSE SERPL-MCNC: 151 MG/DL (ref 65–99)
HCT VFR BLD AUTO: 39.8 % (ref 34–46.6)
HGB BLD-MCNC: 12.8 G/DL (ref 12–15.9)
HOLD SPECIMEN: NORMAL
HOLD SPECIMEN: NORMAL
IMM GRANULOCYTES # BLD AUTO: 0.06 10*3/MM3 (ref 0–0.05)
IMM GRANULOCYTES NFR BLD AUTO: 0.8 % (ref 0–0.5)
LYMPHOCYTES # BLD AUTO: 1.61 10*3/MM3 (ref 0.7–3.1)
LYMPHOCYTES NFR BLD AUTO: 21.8 % (ref 19.6–45.3)
MAGNESIUM SERPL-MCNC: 2 MG/DL (ref 1.6–2.4)
MCH RBC QN AUTO: 28.1 PG (ref 26.6–33)
MCHC RBC AUTO-ENTMCNC: 32.2 G/DL (ref 31.5–35.7)
MCV RBC AUTO: 87.3 FL (ref 79–97)
MONOCYTES # BLD AUTO: 0.82 10*3/MM3 (ref 0.1–0.9)
MONOCYTES NFR BLD AUTO: 11.1 % (ref 5–12)
NEUTROPHILS NFR BLD AUTO: 4.52 10*3/MM3 (ref 1.7–7)
NEUTROPHILS NFR BLD AUTO: 61.1 % (ref 42.7–76)
NRBC BLD AUTO-RTO: 0 /100 WBC (ref 0–0.2)
PLATELET # BLD AUTO: 202 10*3/MM3 (ref 140–450)
PMV BLD AUTO: 11.1 FL (ref 6–12)
POTASSIUM SERPL-SCNC: 4.3 MMOL/L (ref 3.5–5.2)
PROT SERPL-MCNC: 8.4 G/DL (ref 6–8.5)
RBC # BLD AUTO: 4.56 10*6/MM3 (ref 3.77–5.28)
RSV RNA RESP QL NAA+PROBE: NOT DETECTED
SARS-COV-2 RNA RESP QL NAA+PROBE: NOT DETECTED
SODIUM SERPL-SCNC: 133 MMOL/L (ref 136–145)
TROPONIN T % DELTA: 2 %
TROPONIN T NUMERIC DELTA: 1 NG/L
TROPONIN T SERPL HS-MCNC: 54 NG/L
WBC NRBC COR # BLD AUTO: 7.39 10*3/MM3 (ref 3.4–10.8)
WHOLE BLOOD HOLD COAG: NORMAL
WHOLE BLOOD HOLD SPECIMEN: NORMAL

## 2024-12-24 PROCEDURE — 36415 COLL VENOUS BLD VENIPUNCTURE: CPT

## 2024-12-24 PROCEDURE — 83735 ASSAY OF MAGNESIUM: CPT | Performed by: EMERGENCY MEDICINE

## 2024-12-24 PROCEDURE — 85025 COMPLETE CBC W/AUTO DIFF WBC: CPT | Performed by: EMERGENCY MEDICINE

## 2024-12-24 PROCEDURE — 73030 X-RAY EXAM OF SHOULDER: CPT

## 2024-12-24 PROCEDURE — 84484 ASSAY OF TROPONIN QUANT: CPT | Performed by: EMERGENCY MEDICINE

## 2024-12-24 PROCEDURE — 25010000002 ONDANSETRON PER 1 MG: Performed by: PHYSICIAN ASSISTANT

## 2024-12-24 PROCEDURE — 96375 TX/PRO/DX INJ NEW DRUG ADDON: CPT

## 2024-12-24 PROCEDURE — 96374 THER/PROPH/DIAG INJ IV PUSH: CPT

## 2024-12-24 PROCEDURE — 25810000003 SODIUM CHLORIDE 0.9 % SOLUTION: Performed by: PHYSICIAN ASSISTANT

## 2024-12-24 PROCEDURE — 99284 EMERGENCY DEPT VISIT MOD MDM: CPT | Performed by: EMERGENCY MEDICINE

## 2024-12-24 PROCEDURE — 72125 CT NECK SPINE W/O DYE: CPT

## 2024-12-24 PROCEDURE — 80053 COMPREHEN METABOLIC PANEL: CPT | Performed by: EMERGENCY MEDICINE

## 2024-12-24 PROCEDURE — 87637 SARSCOV2&INF A&B&RSV AMP PRB: CPT | Performed by: PHYSICIAN ASSISTANT

## 2024-12-24 PROCEDURE — 93005 ELECTROCARDIOGRAM TRACING: CPT | Performed by: EMERGENCY MEDICINE

## 2024-12-24 PROCEDURE — 70450 CT HEAD/BRAIN W/O DYE: CPT

## 2024-12-24 PROCEDURE — 25010000002 ORPHENADRINE CITRATE PER 60 MG: Performed by: PHYSICIAN ASSISTANT

## 2024-12-24 RX ORDER — ORPHENADRINE CITRATE 100 MG/1
100 TABLET ORAL 2 TIMES DAILY
Qty: 12 TABLET | Refills: 0 | Status: SHIPPED | OUTPATIENT
Start: 2024-12-24

## 2024-12-24 RX ORDER — QUETIAPINE FUMARATE 25 MG/1
TABLET, FILM COATED ORAL
COMMUNITY
Start: 2024-12-05

## 2024-12-24 RX ORDER — ORPHENADRINE CITRATE 100 MG/1
100 TABLET ORAL 2 TIMES DAILY
Qty: 12 TABLET | Refills: 0 | Status: SHIPPED | OUTPATIENT
Start: 2024-12-24 | End: 2024-12-24

## 2024-12-24 RX ORDER — SODIUM CHLORIDE 0.9 % (FLUSH) 0.9 %
10 SYRINGE (ML) INJECTION AS NEEDED
Status: DISCONTINUED | OUTPATIENT
Start: 2024-12-24 | End: 2024-12-24 | Stop reason: HOSPADM

## 2024-12-24 RX ORDER — ORPHENADRINE CITRATE 30 MG/ML
60 INJECTION INTRAMUSCULAR; INTRAVENOUS ONCE
Status: COMPLETED | OUTPATIENT
Start: 2024-12-24 | End: 2024-12-24

## 2024-12-24 RX ORDER — ONDANSETRON 2 MG/ML
4 INJECTION INTRAMUSCULAR; INTRAVENOUS ONCE
Status: COMPLETED | OUTPATIENT
Start: 2024-12-24 | End: 2024-12-24

## 2024-12-24 RX ADMIN — ORPHENADRINE CITRATE 60 MG: 30 INJECTION, SOLUTION INTRAMUSCULAR; INTRAVENOUS at 16:46

## 2024-12-24 RX ADMIN — SODIUM CHLORIDE 500 ML: 9 INJECTION, SOLUTION INTRAVENOUS at 16:46

## 2024-12-24 RX ADMIN — ONDANSETRON 4 MG: 2 INJECTION INTRAMUSCULAR; INTRAVENOUS at 16:46

## 2024-12-24 NOTE — DISCHARGE INSTRUCTIONS
Follow-up with your primary care provider for further outpatient evaluation if symptoms persist.  Drink plenty of fluids to stay hydrated.  Return to the ER for new or worsening symptoms or acute concerns.

## 2024-12-24 NOTE — ED PROVIDER NOTES
Subjective:  Chief Complaint:  Syncope    History of Present Illness:  Patient is a 64-year-old female with history of A-fib on Eliquis, CHF, chronic respiratory failure, diabetes, GERD, among others presenting to the ER with complaints of syncopal episode while at dialysis.  Patient states that she did receive dialysis prior to this episode.  Patient has a fistula in the left arm.  Patient states that she felt normal until dialysis today.  States that while at dialysis she began feeling weak and nauseous.  States that she told them that and then when she got up to leave from dialysis she states she does not remember what happened after that.  States that she is having neck pain and right shoulder pain.  Otherwise denies any pain during initial evaluation.      Nurses Notes reviewed and agree, including vitals, allergies, social history and prior medical history.     REVIEW OF SYSTEMS: All systems reviewed and not pertinent unless noted.  Review of Systems   Gastrointestinal:  Positive for nausea.   Musculoskeletal:  Positive for neck pain.        Right shoulder pain   Neurological:  Positive for syncope.   All other systems reviewed and are negative.      Past Medical History:   Diagnosis Date    A-fib     Acute gastric ulcer without hemorrhage or perforation     Allergic     Anemia 10/02/2018    CHF (congestive heart failure)     Chronic respiratory failure with hypoxia     Chronic systolic (congestive) heart failure     Chronic venous hypertension involving both sides     Diabetes mellitus     Disease of thyroid gland     Edema of both lower extremities     End-stage renal disease on hemodialysis     Esophagitis     Gastroparesis     GERD (gastroesophageal reflux disease)     Gout     History of atrial flutter     History of transfusion     Hyperlipidemia     Hypertension     Hypothyroidism     Impaired functional mobility and activity tolerance     Impaired functional mobility, balance, gait, and endurance      "Loss of consciousness     Lymphadenopathy     Morbid obesity     Mucopurulent chronic bronchitis     Nocturnal hypoxia     Obstructive sleep apnea syndrome     Pneumonia     Pressure ulcer of right heel, unstageable     Pulmonary hypertension     Secondary hyperparathyroidism     Stage 3 chronic kidney disease     Tachycardia-bradycardia syndrome     Type 2 diabetes mellitus with diabetic chronic kidney disease     Unspecified severe protein-calorie malnutrition     Vitamin B 12 deficiency        Allergies:    Metformin and related and Metformin      Past Surgical History:   Procedure Laterality Date    ENDOSCOPY N/A 5/2/2022    Procedure: ESOPHAGOGASTRODUODENOSCOPY WITH BIOPSY;  Surgeon: Jacob Chance MD;  Location: [facility] ENDOSCOPY;  Service: Gastroenterology;  Laterality: N/A;    EYE SURGERY      INCISION AND DRAINAGE LEG Right 1/14/2019    Procedure: Right heel incision and drainage with graft application;  Surgeon: Ar Rueda DPM;  Location: [facility] OR;  Service: Podiatry    INSERTION HEMODIALYSIS CATHETER N/A 4/5/2022    Procedure: HEMODIALYSIS CATHETER INSERTION;  Surgeon: Jean Carlos Guadalupe MD;  Location: [facility] OR;  Service: General;  Laterality: N/A;    LEG SURGERY           Social History     Socioeconomic History    Marital status: Single   Tobacco Use    Smoking status: Never    Smokeless tobacco: Never   Vaping Use    Vaping status: Never Used   Substance and Sexual Activity    Alcohol use: No    Drug use: No    Sexual activity: Defer         Family History   Problem Relation Age of Onset    Asthma Mother     Hypertension Father     Stroke Father        Objective  Physical Exam:  /78   Pulse 108   Temp 97.6 °F (36.4 °C) (Oral)   Resp 20   Ht 167.6 cm (65.98\")   Wt 109 kg (240 lb 4.8 oz)   SpO2 98%   BMI 38.80 kg/m²      Physical Exam  Vitals and nursing note reviewed.   Constitutional:       General: She is not in acute distress.     Appearance: She is not toxic-appearing. "   HENT:      Head: Normocephalic and atraumatic.      Right Ear: External ear normal.      Left Ear: External ear normal.      Nose: Nose normal.   Eyes:      Extraocular Movements: Extraocular movements intact.      Conjunctiva/sclera: Conjunctivae normal.   Cardiovascular:      Rate and Rhythm: Tachycardia present.   Pulmonary:      Effort: Pulmonary effort is normal. No respiratory distress.   Abdominal:      General: There is no distension.      Palpations: Abdomen is soft.   Musculoskeletal:         General: Normal range of motion.      Cervical back: Normal range of motion and neck supple.      Comments: No deformity or obvious injury to extremities, neurovascularly intact   Skin:     General: Skin is warm and dry.   Neurological:      General: No focal deficit present.      Mental Status: She is alert and oriented to person, place, and time.   Psychiatric:         Mood and Affect: Mood normal.         Behavior: Behavior normal.         Procedures    ED Course:    ED Course as of 12/24/24 1828   Tue Dec 24, 2024   1548 EKG: I reviewed and independently interpreted the EKG as:  Sinus tach rate of 114, normal axis, normal CT interval, short QT interval, no ST elevation, no T wave inversions [CS]   1607 Was notified by nursing staff that patient is now complaining of pain in her right leg.  Went to evaluate patient and she is groaning in pain complaining of her legs hurting.  Patient states it is both legs and denies injury.  States that they are cramping.  Ordered IV Norflex.  Will reevaluate. [AP]      ED Course User Index  [AP] Sakina Atkins, YING  [CS] Ayush Saunders MD       Lab Results (last 24 hours)       Procedure Component Value Units Date/Time    CBC & Differential [[id number]]  (Abnormal) Collected: 12/24/24 1605    Specimen: Blood Updated: 12/24/24 1624    Narrative:      The following orders were created for panel order CBC & Differential.  Procedure                                Abnormality         Status                     ---------                               -----------         ------                     CBC Auto Differential[[id number]]        Abnormal            Final result                 Please view results for these tests on the individual orders.    Comprehensive Metabolic Panel [[id number]]  (Abnormal) Collected: 12/24/24 1605    Specimen: Blood Updated: 12/24/24 1643     Glucose 151 mg/dL      BUN 22 mg/dL      Creatinine 4.26 mg/dL      Sodium 133 mmol/L      Potassium 4.3 mmol/L      Comment: Slight hemolysis detected by analyzer. Result may be falsely elevated.        Chloride 86 mmol/L      CO2 25.9 mmol/L      Calcium 10.1 mg/dL      Total Protein 8.4 g/dL      Albumin 4.6 g/dL      ALT (SGPT) 8 U/L      AST (SGOT) 19 U/L      Alkaline Phosphatase 181 U/L      Total Bilirubin 0.5 mg/dL      Globulin 3.8 gm/dL      A/G Ratio 1.2 g/dL      BUN/Creatinine Ratio 5.2     Anion Gap 21.1 mmol/L      eGFR 10.9 mL/min/1.73     Narrative:      GFR Categories in Chronic Kidney Disease (CKD)      GFR Category          GFR (mL/min/1.73)    Interpretation  G1                     90 or greater         Normal or high (1)  G2                      60-89                Mild decrease (1)  G3a                   45-59                Mild to moderate decrease  G3b                   30-44                Moderate to severe decrease  G4                    15-29                Severe decrease  G5                    14 or less           Kidney failure          (1)In the absence of evidence of kidney disease, neither GFR category G1 or G2 fulfill the criteria for CKD.    eGFR calculation 2021 CKD-EPI creatinine equation, which does not include race as a factor    Magnesium [[id number]]  (Normal) Collected: 12/24/24 1605    Specimen: Blood Updated: 12/24/24 1643     Magnesium 2.0 mg/dL     High Sensitivity Troponin T [[id number]]  (Abnormal) Collected: 12/24/24 1605    Specimen: Blood Updated: 12/24/24  1658     HS Troponin T 54 ng/L     Narrative:      High Sensitive Troponin T Reference Range:  <14.0 ng/L- Negative Female for AMI  <22.0 ng/L- Negative Male for AMI  >=14 - Abnormal Female indicating possible myocardial injury.  >=22 - Abnormal Male indicating possible myocardial injury.   Clinicians would have to utilize clinical acumen, EKG, Troponin, and serial changes to determine if it is an Acute Myocardial Infarction or myocardial injury due to an underlying chronic condition.         CBC Auto Differential [[id number]]  (Abnormal) Collected: 12/24/24 1605    Specimen: Blood Updated: 12/24/24 1624     WBC 7.39 10*3/mm3      RBC 4.56 10*6/mm3      Hemoglobin 12.8 g/dL      Hematocrit 39.8 %      MCV 87.3 fL      MCH 28.1 pg      MCHC 32.2 g/dL      RDW 15.5 %      RDW-SD 49.5 fl      MPV 11.1 fL      Platelets 202 10*3/mm3      Neutrophil % 61.1 %      Lymphocyte % 21.8 %      Monocyte % 11.1 %      Eosinophil % 4.1 %      Basophil % 1.1 %      Immature Grans % 0.8 %      Neutrophils, Absolute 4.52 10*3/mm3      Lymphocytes, Absolute 1.61 10*3/mm3      Monocytes, Absolute 0.82 10*3/mm3      Eosinophils, Absolute 0.30 10*3/mm3      Basophils, Absolute 0.08 10*3/mm3      Immature Grans, Absolute 0.06 10*3/mm3      nRBC 0.0 /100 WBC     COVID-19, FLU A/B, RSV PCR 1 HR TAT - Swab, Nasopharynx [[id number]]  (Normal) Collected: 12/24/24 1647    Specimen: Swab from Nasopharynx Updated: 12/24/24 1736     COVID19 Not Detected     Influenza A PCR Not Detected     Influenza B PCR Not Detected     RSV, PCR Not Detected    Narrative:      Fact sheet for providers: https://www.fda.gov/media/578130/download    Fact sheet for patients: https://www.fda.gov/media/922525/download    Test performed by PCR.    High Sensitivity Troponin T 1Hr [[id number]]  (Abnormal) Collected: 12/24/24 1710    Specimen: Blood Updated: 12/24/24 1809     HS Troponin T 55 ng/L      Troponin T Numeric Delta 1 ng/L      Troponin T % Delta 2 %      Narrative:      High Sensitive Troponin T Reference Range:  <14.0 ng/L- Negative Female for AMI  <22.0 ng/L- Negative Male for AMI  >=14 - Abnormal Female indicating possible myocardial injury.  >=22 - Abnormal Male indicating possible myocardial injury.   Clinicians would have to utilize clinical acumen, EKG, Troponin, and serial changes to determine if it is an Acute Myocardial Infarction or myocardial injury due to an underlying chronic condition.                  XR Shoulder 2+ View Right    Result Date: 12/24/2024  CLINICAL INDICATION:  shoulder pain after fall  EXAMINATION TECHNIQUE: XR SHOULDER 2+ VW RIGHT-  COMPARISON: None.  FINDINGS: No fracture, subluxation, or dislocation is identified. Mild glenohumeral joint degenerative changes. Mild acromioclavicular joint degenerative changes. Osseous mineralization is within normal limits. No soft tissue abnormalities. No abnormalities in the visualized portions of the lungs.      Impression: No acute osseous findings.  Images personally reviewed, interpreted and dictated by CHI Gutierrez.     This report was signed and finalized on 12/24/2024 4:48 PM by CHI Gutierrez.      CT Head Without Contrast    Result Date: 12/24/2024  HEAD CT     12/24/2024 4:29 PM  HISTORY: syncope, fall while at dialysis  TECHNIQUE: Multiple axial CT images were performed from the foramen magnum to the vertex. Coronal reformatted images were reconstructed from axial data set. This study was performed with techniques to keep radiation doses as low as reasonably achievable (ALARA). Individualized dose reduction techniques using automated exposure control or adjustment of mA and/or kV according to the patient size were employed. 1163 mGy*cm  COMPARISON: None  FINDINGS: No acute intracranial hemorrhage or large acute cortical infarct. Mild chronic small vessel ischemic white matter changes are present. No significant cerebral volume loss. Ventricles are normal in size and  configuration. No midline shift. The basal cisterns are patent. Bilateral globe lens surgery. Mild to moderate intracranial arterial atherosclerotic calcifications.  No skull fracture. The visualized paranasal sinuses and mastoid air cells are clear.      Impression: No acute intracranial hemorrhage or large acute cortical infarct.   CRITICAL RESULT: No.  COMMUNICATION: Per this written report.  Images personally reviewed, interpreted, and dictated by CHI Guiterrez.            This report was signed and finalized on 12/24/2024 4:47 PM by CHI Gutierrez.      CT Cervical Spine Without Contrast    Result Date: 12/24/2024  CT CERVICAL SPINE     12/24/2024 4:28 PM  HISTORY: Status post fall with neck pain.syncope, fall while at dialysis  COMPARISON:  None.  PROCEDURE: Axial images were obtained from the skull base to the thoracic inlet by computed tomography. 3 D reconstruction images were performed. This study was performed with techniques to keep radiation doses as low as reasonably achievable, (ALARA). Individualized dose reduction techniques using automated exposure control or adjustment of mA and/or kV according to the patient size were employed. 1163 mGy*cm   FINDINGS: There is no acute fracture or malalignment of the cervical spine. The facets are normally aligned. The cervical lordosis is maintained. Multilevel degenerative changes are present. No acute paraspinal abnormality. Limited images of the lung apices are unremarkable. Mild to moderate bilateral carotid bulb atherosclerotic calcifications. Mild to moderate intracranial arterial atherosclerotic calcifications.      Impression: No acute fracture or malalignment of the cervical spine.     Images personally reviewed, interpreted and dictated by CHI Gutierrez.  This report was signed and finalized on 12/24/2024 4:42 PM by CHI Gutierrez.          [facility]  Patient evaluated in the ER for syncopal episode while at dialysis.  Patient  tachycardic upon arrival, blood pressure of 98/65, afebrile with oxygen saturation of 97% on room air.  Patient is noted to be on midodrine for chronic low blood pressures.  Patient received dialysis today prior to syncopal episode and got up from dialysis to leave and had a syncopal episode.  Differential diagnosis includes but is not limited to intracranial hemorrhage, ACS, cardiac arrhythmia, electrolyte abnormality, vasovagal episode, among others.  Initial plan includes CBC, CMP, magnesium, troponin, COVID/flu/RSV swab, urinalysis, EKG, CT head without contrast, CT cervical spine, x-ray of right shoulder, initial treatment with 60 mg Norflex and 4 mg IV Zofran as well as a small fluid bolus.    Troponins were elevated but no significant delta, 54 and 55.  Patient is without chest pain.  Troponins are likely elevated due to chronic kidney dysfunction.  Patient has a creatinine of 4.26.  Potassium and CO2 are within normal limits.  COVID, flu, RSV negative.  White blood cell count and hemoglobin are within normal limits.  Patient has not been able give a urine sample.  CTs are unremarkable and shoulder x-ray is unremarkable as well.  Patient appears comfortable watching Elf movie on re-evaluation.  She is agreeable with plan for discharge.  Recommended follow-up with PCP for further outpatient evaluation.  Prescription was given for muscle relaxers for leg cramping.  Return precautions were given for any new or worsening symptoms or acute concerns.    Final diagnoses:   Syncope, unspecified syncope type   Acute pain of right shoulder   Neck pain   Dialysis patient          Sakina Atkins PA-C  12/24/24 4720

## 2024-12-26 ENCOUNTER — NURSING HOME (OUTPATIENT)
Dept: FAMILY MEDICINE CLINIC | Facility: CLINIC | Age: 64
End: 2024-12-26
Payer: MEDICARE

## 2024-12-26 VITALS
HEART RATE: 83 BPM | TEMPERATURE: 98.5 F | BODY MASS INDEX: 38.49 KG/M2 | SYSTOLIC BLOOD PRESSURE: 164 MMHG | DIASTOLIC BLOOD PRESSURE: 70 MMHG | RESPIRATION RATE: 18 BRPM | OXYGEN SATURATION: 99 % | HEIGHT: 66 IN | WEIGHT: 239.5 LBS
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
## 2024-12-26 DIAGNOSIS — Z74.09 IMPAIRED FUNCTIONAL MOBILITY AND ACTIVITY TOLERANCE: ICD-10-CM

## 2024-12-26 DIAGNOSIS — R55 SYNCOPE AND COLLAPSE: ICD-10-CM

## 2024-12-26 DIAGNOSIS — Z99.2 END STAGE RENAL DISEASE ON DIALYSIS: ICD-10-CM

## 2024-12-26 DIAGNOSIS — Z79.01 ANTICOAGULATED: ICD-10-CM

## 2024-12-26 DIAGNOSIS — S80.12XA HEMATOMA OF LEFT LOWER EXTREMITY, INITIAL ENCOUNTER: Primary | ICD-10-CM

## 2024-12-26 DIAGNOSIS — N18.6 END STAGE RENAL DISEASE ON DIALYSIS: ICD-10-CM

## 2024-12-26 PROBLEM — N18.30 CKD (CHRONIC KIDNEY DISEASE) STAGE 3, GFR 30-59 ML/MIN: Status: RESOLVED | Noted: 2019-08-14 | Resolved: 2024-12-26

## 2024-12-26 PROBLEM — N18.4 CHRONIC KIDNEY DISEASE, STAGE IV (SEVERE): Status: RESOLVED | Noted: 2022-03-04 | Resolved: 2024-12-26

## 2024-12-26 PROCEDURE — 99309 SBSQ NF CARE MODERATE MDM 30: CPT | Performed by: NURSE PRACTITIONER

## 2024-12-26 NOTE — PROGRESS NOTES
Nursing Home Follow Up Note      Praful Archuleta DO []   SNEHA Salmon [x]  [address] [place] [address]  Phone: [phone]  Fax: [phone] Beto Aguayo MD []    Rafiq Baron DO []   [address] [place] [address]  Phone: [phone]  Fax: [phone]     PATIENT NAME: Millicent Mckeon                                                                          YOB: 1960           DATE OF SERVICE: 12/26/2024  FACILITY:  [x][facility]   [] [facility]   [] [facility]   [] [facility]   [] Other ______________________________________________________________________      CHIEF COMPLAINT:    Fall at dialysis on Tuesday.       HISTORY OF PRESENT ILLNESS:     Nursing reports that on Tuesday, 12/24, patient had a fall at dialysis.  She reports that she felt dizzy and nauseous after dialysis and when she stood to leave she does not remember anything else after that.  She apparently fell onto the floor on her buttocks.  She reports her buttocks is still little sore but not her coccyx, hips or any of the bones.  She did have some right shoulder pain and neck pain but was sent to the emergency room right after the fall and x-rays of her neck and shoulder were all normal.  She also had a CT scan of her head that was normal.  Nursing also noticed a tender raised area to her left lower extremity, with some bruising.  She reports it has been there since the fall but does not know if she hit her leg on anything.  The leg does not hurt in any way but the area is tender with palpation.    No other complaints or concerns today and no signs and symptoms of any distress.    PAST MEDICAL & SURGICAL HISTORY:   Past Medical History:   Diagnosis Date    A-fib     Acute gastric ulcer without hemorrhage or perforation     Allergic     Anemia 10/02/2018    CHF (congestive heart failure)     Chronic respiratory failure with hypoxia     Chronic systolic (congestive) heart failure     Chronic venous  hypertension involving both sides     Diabetes mellitus     Disease of thyroid gland     Edema of both lower extremities     End-stage renal disease on hemodialysis     Esophagitis     Gastroparesis     GERD (gastroesophageal reflux disease)     Gout     History of atrial flutter     History of transfusion     Hyperlipidemia     Hypertension     Hypothyroidism     Impaired functional mobility and activity tolerance     Impaired functional mobility, balance, gait, and endurance     Loss of consciousness     Lymphadenopathy     Morbid obesity     Mucopurulent chronic bronchitis     Nocturnal hypoxia     Obstructive sleep apnea syndrome     Pneumonia     Pressure ulcer of right heel, unstageable     Pulmonary hypertension     Secondary hyperparathyroidism     Stage 3 chronic kidney disease     Tachycardia-bradycardia syndrome     Type 2 diabetes mellitus with diabetic chronic kidney disease     Unspecified severe protein-calorie malnutrition     Vitamin B 12 deficiency       Past Surgical History:   Procedure Laterality Date    ENDOSCOPY N/A 5/2/2022    Procedure: ESOPHAGOGASTRODUODENOSCOPY WITH BIOPSY;  Surgeon: Jacob Chance MD;  Location: [facility] ENDOSCOPY;  Service: Gastroenterology;  Laterality: N/A;    EYE SURGERY      INCISION AND DRAINAGE LEG Right 1/14/2019    Procedure: Right heel incision and drainage with graft application;  Surgeon: Ar Rueda DPM;  Location: [facility] OR;  Service: Podiatry    INSERTION HEMODIALYSIS CATHETER N/A 4/5/2022    Procedure: HEMODIALYSIS CATHETER INSERTION;  Surgeon: Jean Carlos Guadalupe MD;  Location: [facility] OR;  Service: General;  Laterality: N/A;    LEG SURGERY           MEDICATIONS:  I have reviewed and reconciled the patients medication list in the patients chart at the skilled nursing facility today.      ALLERGIES:    Allergies   Allergen Reactions    Metformin And Related Anaphylaxis     HA, diarrhea, throat swelling    Metformin GI Intolerance         SOCIAL  "HISTORY:    Social History     Socioeconomic History    Marital status: Single   Tobacco Use    Smoking status: Never    Smokeless tobacco: Never   Vaping Use    Vaping status: Never Used   Substance and Sexual Activity    Alcohol use: No    Drug use: No    Sexual activity: Defer       FAMILY HISTORY:    Family History   Problem Relation Age of Onset    Asthma Mother     Hypertension Father     Stroke Father        REVIEW OF SYSTEMS:    Review of Systems   Constitutional:  Negative for activity change, appetite change, chills, diaphoresis, fatigue and fever.   HENT:  Negative for congestion, ear pain, nosebleeds, postnasal drip, sore throat and voice change.    Eyes:  Negative for redness and visual disturbance.   Respiratory:  Negative for cough, chest tightness, shortness of breath and wheezing.    Cardiovascular:  Negative for chest pain, palpitations and leg swelling.   Gastrointestinal:  Negative for abdominal distention, abdominal pain, blood in stool, constipation, diarrhea, nausea, vomiting and indigestion.   Genitourinary:  Negative for dysuria, flank pain, frequency, hematuria and urinary incontinence.        Dialysis   Musculoskeletal:  Negative for arthralgias, gait problem and myalgias.   Skin:  Negative for color change and rash.   Neurological:  Positive for syncope (episode at dialysis). Negative for dizziness, seizures, speech difficulty, weakness, headache, memory problem and confusion.   Psychiatric/Behavioral:  Negative for behavioral problems, dysphoric mood, hallucinations, sleep disturbance and depressed mood. The patient is not nervous/anxious.          PHYSICAL EXAMINATION:   VITAL SIGNS:   Vitals:    12/26/24 1102   BP: 164/70   Pulse: 83   Resp: 18   Temp: 98.5 °F (36.9 °C)   SpO2: 99%   Weight: 109 kg (239 lb 8 oz)   Height: 167.6 cm (66\")        Physical Exam  Vitals reviewed.   Constitutional:       Appearance: Normal appearance.   HENT:      Mouth/Throat:      Comments: PND  Eyes:      " Conjunctiva/sclera: Conjunctivae normal.   Cardiovascular:      Rate and Rhythm: Normal rate and regular rhythm.   Pulmonary:      Effort: Pulmonary effort is normal.      Breath sounds: Normal breath sounds.   Abdominal:      General: Bowel sounds are normal. There is no distension.      Palpations: Abdomen is soft.   Skin:     General: Skin is warm.   Neurological:      General: No focal deficit present.      Mental Status: She is alert and oriented to person, place, and time.   Psychiatric:         Mood and Affect: Mood and affect normal.         Behavior: Behavior normal.         Cognition and Memory: Cognition and memory normal.         RECORDS REVIEW:   I have reviewed records in EMR    ASSESSMENT     Diagnoses and all orders for this visit:    1. Hematoma of left lower extremity, initial encounter (Primary)    2. Anticoagulated    3. End stage renal disease on dialysis    4. Syncope and collapse    5. Fall, initial encounter    6. Impaired functional mobility and activity tolerance          PLAN    Syncopal episode after dialysis on Tuesday.  She was sent to [facility] where x-rays of right shoulder and cervical spine were negative.  CT of head negative.  She does have a small hematoma to her left anterior lower extremity but is on anticoagulant. Nursing to apply warm compresses 3 times a day x 7 days.  Notify of any concerns or changes.    Patient and nursing encouraged to keep me informed of any acute changes, lack of improvement, or any new concerning symptoms.     Continue supportive care for all ADLs    Will follow-up and continue to monitor.    [x]  Discussed Patient in detail with nursing/staff, addressed all needs today.     [x]  Plan of Care Reviewed   [x]  PT/OT Reviewed   []  Order Changes  [x]  Discharge Plans Reviewed  [x]  Advance Directive on file with Nursing Home.   [x]  POA on file with Nursing Home.   [x]  Code Status listed: []  Full Code   []  DNR     I spent 30 minutes  caring for Millicent on this date of service. This time includes time spent by me in the following activities:preparing for the visit, reviewing tests, obtaining and/or reviewing a separately obtained history, performing a medically appropriate examination and/or evaluation , counseling and educating the patient/family/caregiver, ordering medications, tests, or procedures, referring and communicating with other health care professionals , documenting information in the medical record, independently interpreting results and communicating that information with the patient/family/caregiver, and care coordination    I confirm accuracy of unchanged data/findings which have been carried forward from previous visit, as well as I have updated appropriately those that have changed.     Sidra Stevens, APRN.

## 2025-01-21 ENCOUNTER — NURSING HOME (OUTPATIENT)
Dept: INTERNAL MEDICINE | Facility: CLINIC | Age: 65
End: 2025-01-21
Payer: MEDICARE

## 2025-01-21 VITALS
SYSTOLIC BLOOD PRESSURE: 127 MMHG | DIASTOLIC BLOOD PRESSURE: 73 MMHG | HEART RATE: 98 BPM | OXYGEN SATURATION: 96 % | HEIGHT: 66 IN | TEMPERATURE: 97.3 F | WEIGHT: 235.8 LBS | BODY MASS INDEX: 37.9 KG/M2 | RESPIRATION RATE: 18 BRPM

## 2025-01-21 DIAGNOSIS — N18.6 ESRD (END STAGE RENAL DISEASE): ICD-10-CM

## 2025-01-21 DIAGNOSIS — E11.21 TYPE 2 DIABETES MELLITUS WITH NEPHROPATHY: Primary | ICD-10-CM

## 2025-01-21 DIAGNOSIS — E66.01 CLASS 2 SEVERE OBESITY DUE TO EXCESS CALORIES WITH SERIOUS COMORBIDITY AND BODY MASS INDEX (BMI) OF 38.0 TO 38.9 IN ADULT: ICD-10-CM

## 2025-01-21 DIAGNOSIS — F41.1 GENERALIZED ANXIETY DISORDER: ICD-10-CM

## 2025-01-21 DIAGNOSIS — E03.9 ACQUIRED HYPOTHYROIDISM: ICD-10-CM

## 2025-01-21 DIAGNOSIS — E66.812 CLASS 2 SEVERE OBESITY DUE TO EXCESS CALORIES WITH SERIOUS COMORBIDITY AND BODY MASS INDEX (BMI) OF 38.0 TO 38.9 IN ADULT: ICD-10-CM

## 2025-01-21 DIAGNOSIS — D64.9 CHRONIC ANEMIA: ICD-10-CM

## 2025-01-21 DIAGNOSIS — I10 ESSENTIAL HYPERTENSION: ICD-10-CM

## 2025-01-21 DIAGNOSIS — K21.01 GASTROESOPHAGEAL REFLUX DISEASE WITH ESOPHAGITIS AND HEMORRHAGE: ICD-10-CM

## 2025-01-21 PROCEDURE — 99308 SBSQ NF CARE LOW MDM 20: CPT | Performed by: INTERNAL MEDICINE

## 2025-01-21 NOTE — LETTER
Nursing Home Progress Note        Rafiq Baron DO   [address] [place] [address] Phone: [phone]  Fax: [phone]     PATIENT NAME: Millicent Mckeon                                                                          YOB: 1960           DATE OF SERVICE: 01/21/2025  FACILITY:  [facility]      CHIEF COMPLAINT:  Chronic Medical Management    History of Present Illness  The patient is a 64-year-old female with end-stage renal disease and diabetes, being seen in the nursing facility today for routine medical management.    She reports a general sense of well-being with no specific complaints at this time.    She has expressed a desire to reduce the duration of her dialysis sessions, a decision that will be deferred to the nephrology team and is likely not appropriate.  I had been notified a month earlier that the patient seemed to have increased agitation when we attempted to reduce psychiatric medications.        PAST MEDICAL & SURGICAL HISTORY:   Past Medical History:   Diagnosis Date   • A-fib    • Acute gastric ulcer without hemorrhage or perforation    • Allergic    • Anemia 10/02/2018   • CHF (congestive heart failure)    • Chronic respiratory failure with hypoxia    • Chronic systolic (congestive) heart failure    • Chronic venous hypertension involving both sides    • Diabetes mellitus    • Disease of thyroid gland    • Edema of both lower extremities    • End-stage renal disease on hemodialysis    • Esophagitis    • Gastroparesis    • GERD (gastroesophageal reflux disease)    • Gout    • History of atrial flutter    • History of transfusion    • Hyperlipidemia    • Hypertension    • Hypothyroidism    • Impaired functional mobility and activity tolerance    • Impaired functional mobility, balance, gait, and endurance    • Loss of consciousness    • Lymphadenopathy    • Morbid obesity    • Mucopurulent chronic bronchitis    • Nocturnal hypoxia    • Obstructive sleep apnea  syndrome    • Pneumonia    • Pressure ulcer of right heel, unstageable    • Pulmonary hypertension    • Secondary hyperparathyroidism    • Stage 3 chronic kidney disease    • Tachycardia-bradycardia syndrome    • Type 2 diabetes mellitus with diabetic chronic kidney disease    • Unspecified severe protein-calorie malnutrition    • Vitamin B 12 deficiency       Past Surgical History:   Procedure Laterality Date   • ENDOSCOPY N/A 5/2/2022    Procedure: ESOPHAGOGASTRODUODENOSCOPY WITH BIOPSY;  Surgeon: Jacob Chance MD;  Location: [facility] ENDOSCOPY;  Service: Gastroenterology;  Laterality: N/A;   • EYE SURGERY     • INCISION AND DRAINAGE LEG Right 1/14/2019    Procedure: Right heel incision and drainage with graft application;  Surgeon: Ar Rueda DPM;  Location: [facility] OR;  Service: Podiatry   • INSERTION HEMODIALYSIS CATHETER N/A 4/5/2022    Procedure: HEMODIALYSIS CATHETER INSERTION;  Surgeon: Jean Carlos Guadalupe MD;  Location: [facility] OR;  Service: General;  Laterality: N/A;   • LEG SURGERY           MEDICATIONS:  I have reviewed and reconciled the patients medication list in the patients chart at the skilled nursing facility today, 01/21/2025.      ALLERGIES:  Allergies   Allergen Reactions   • Metformin And Related Anaphylaxis     HA, diarrhea, throat swelling   • Metformin GI Intolerance         SOCIAL HISTORY:  Social History     Socioeconomic History   • Marital status: Single   Tobacco Use   • Smoking status: Never   • Smokeless tobacco: Never   Vaping Use   • Vaping status: Never Used   Substance and Sexual Activity   • Alcohol use: No   • Drug use: No   • Sexual activity: Defer       FAMILY HISTORY:  Family History   Problem Relation Age of Onset   • Asthma Mother    • Hypertension Father    • Stroke Father        REVIEW OF SYSTEMS:  Review of Systems   Constitutional:  Negative for chills, fatigue and fever.   HENT:  Negative for congestion, ear pain, rhinorrhea, sinus pressure and sore  "throat.    Eyes:  Negative for visual disturbance.   Respiratory:  Negative for cough, chest tightness, shortness of breath and wheezing.    Cardiovascular:  Negative for chest pain, palpitations and leg swelling.   Gastrointestinal:  Negative for abdominal pain, blood in stool, constipation, diarrhea, nausea and vomiting.   Endocrine: Negative for polydipsia and polyuria.   Genitourinary:  Negative for dysuria and hematuria.   Musculoskeletal:  Negative for arthralgias and back pain.   Skin:  Negative for rash.   Neurological:  Negative for dizziness, light-headedness, numbness and headaches.   Psychiatric/Behavioral:  Negative for dysphoric mood and sleep disturbance. The patient is not nervous/anxious.         PHYSICAL EXAMINATION:     VITAL SIGNS:  /73   Pulse 98   Temp 97.3 °F (36.3 °C)   Resp 18   Ht 167.6 cm (66\")   Wt 107 kg (235 lb 12.8 oz)   SpO2 96%   BMI 38.06 kg/m²     Physical Exam  Vitals and nursing note reviewed.   Constitutional:       Appearance: Normal appearance. She is well-developed. She is obese.   HENT:      Head: Normocephalic and atraumatic.      Nose: Nose normal.      Mouth/Throat:      Mouth: Mucous membranes are moist.      Pharynx: No oropharyngeal exudate.   Eyes:      General: No scleral icterus.     Conjunctiva/sclera: Conjunctivae normal.      Pupils: Pupils are equal, round, and reactive to light.   Neck:      Thyroid: No thyromegaly.   Cardiovascular:      Rate and Rhythm: Normal rate. Rhythm irregular.      Heart sounds: Normal heart sounds. No murmur heard.     No friction rub. No gallop.   Pulmonary:      Effort: Pulmonary effort is normal. No respiratory distress.      Breath sounds: Normal breath sounds. No wheezing.   Abdominal:      General: Bowel sounds are normal. There is no distension.      Palpations: Abdomen is soft.      Tenderness: There is no abdominal tenderness.   Musculoskeletal:         General: No deformity or signs of injury.      Cervical " back: Normal range of motion and neck supple.      Right lower leg: Edema present.      Left lower leg: Edema present.      Comments: Left upper extremity AV fistula    Lymphadenopathy:      Cervical: No cervical adenopathy.   Skin:     General: Skin is warm and dry.      Findings: No rash.   Neurological:      Mental Status: She is alert and oriented to person, place, and time.   Psychiatric:         Mood and Affect: Mood normal.         Behavior: Behavior normal.       RECORDS REVIEW:   Results  Laboratory Studies  CBC and CMP from 11/14/2024 were normal. A1c was 7.9. Blood sugar levels are mostly in the 100s.      ASSESSMENT     Diagnoses and all orders for this visit:    1. Type 2 diabetes mellitus with nephropathy (Primary)    2. ESRD (end stage renal disease)    3. Generalized anxiety disorder    4. Class 2 severe obesity due to excess calories with serious comorbidity and body mass index (BMI) of 38.0 to 38.9 in adult    5. Gastroesophageal reflux disease with esophagitis and hemorrhage    6. Essential hypertension    7. Acquired hypothyroidism    8. Chronic anemia        Assessment & Plan   Diabetes Mellitus.      Type 2 diabetes mellitus with hypoglycemia  -Continue Levemir  with sliding scale.  Glucose levels have been in good range recently.  - pt remains off ozempic at this time.     End-stage renal disease  - Continue hemodialysis as scheduled 3 times a week.  -CBC, CMP, A1c every 3 months.  -It is noted that nephrology preferred not to have adjustments to psychiatric medications due to concerns of behaviors during dialysis    Obesity  - monitor weights with discontinuing of Ozempic.  Patient advised to reduce high calorie foods.     Gastroesophageal Reflux Disease (GERD).  - controlled with PPI     Motion Sickness / Gastroparesis  She experiences nausea primarily during car rides. Zofran is available for use during transportation. Meclizine is also available for motion sickness if  needed.    Anxiety  -Has been stable on Lexapro.     Bilateral Lower Extremity Edema  -Fair control with elevation and compression     Acute blood loss anemia secondary to esophageal ulcers  -Cont iron supplements.  CBC being monitored by nephrology regularly.      History of chronic diarrhea  - less issues reported lately.     Essential hypertension  - Stable controlled with current medication regimen.     Atrial fibrillation  - Stable control of rhythm.  Continue cardiac medications.  - Continue Eliquis.     Hypothyroidism  - Continue current dose of Synthroid.  TSH is monitored every 3 months in facility.         [x]  Discussed Patient in detail with nursing/staff, addressed all needs today.     [x]  Plan of Care Reviewed   []  PT/OT Reviewed   []  Order Changes  []  Discharge Plans Reviewed  [x]  Advance Directive on file with Nursing Home.   [x]  POA on file with Nursing Home.    [x]  Code Status listed and reviewed.     I confirm accuracy of unchanged data/findings including physical exam and plan which have been carried forward from previous visit, as well as I have updated appropriately those that have changed        Rafiq Baron DO.  1/22/2025      Patient or patient representative verbalized consent for the use of Ambient Listening during the visit with  Rafiq Baron DO for chart documentation. 1/22/2025  11:16 EST

## 2025-01-21 NOTE — PROGRESS NOTES
Nursing Home Progress Note        Rafiq Baron DO   [address] [place] [address] Phone: [phone]  Fax: [phone]     PATIENT NAME: Millicent Mckeon                                                                          YOB: 1960           DATE OF SERVICE: 01/21/2025  [facility]      CHIEF COMPLAINT:  Chronic Medical Management    History of Present Illness  The patient is a 64-year-old female with end-stage renal disease and diabetes, being seen in the nursing facility today for routine medical management.    She reports a general sense of well-being with no specific complaints at this time.    She has expressed a desire to reduce the duration of her dialysis sessions, a decision that will be deferred to the nephrology team and is likely not appropriate.  I had been notified a month earlier that the patient seemed to have increased agitation when we attempted to reduce psychiatric medications.        PAST MEDICAL & SURGICAL HISTORY:   Past Medical History:   Diagnosis Date    A-fib     Acute gastric ulcer without hemorrhage or perforation     Allergic     Anemia 10/02/2018    CHF (congestive heart failure)     Chronic respiratory failure with hypoxia     Chronic systolic (congestive) heart failure     Chronic venous hypertension involving both sides     Diabetes mellitus     Disease of thyroid gland     Edema of both lower extremities     End-stage renal disease on hemodialysis     Esophagitis     Gastroparesis     GERD (gastroesophageal reflux disease)     Gout     History of atrial flutter     History of transfusion     Hyperlipidemia     Hypertension     Hypothyroidism     Impaired functional mobility and activity tolerance     Impaired functional mobility, balance, gait, and endurance     Loss of consciousness     Lymphadenopathy     Morbid obesity     Mucopurulent chronic bronchitis     Nocturnal hypoxia     Obstructive sleep apnea syndrome     Pneumonia      Pressure ulcer of right heel, unstageable     Pulmonary hypertension     Secondary hyperparathyroidism     Stage 3 chronic kidney disease     Tachycardia-bradycardia syndrome     Type 2 diabetes mellitus with diabetic chronic kidney disease     Unspecified severe protein-calorie malnutrition     Vitamin B 12 deficiency       Past Surgical History:   Procedure Laterality Date    ENDOSCOPY N/A 5/2/2022    Procedure: ESOPHAGOGASTRODUODENOSCOPY WITH BIOPSY;  Surgeon: Jacob Chance MD;  Location: [facility] ENDOSCOPY;  Service: Gastroenterology;  Laterality: N/A;    EYE SURGERY      INCISION AND DRAINAGE LEG Right 1/14/2019    Procedure: Right heel incision and drainage with graft application;  Surgeon: Ar Rueda DPM;  Location: [facility] OR;  Service: Podiatry    INSERTION HEMODIALYSIS CATHETER N/A 4/5/2022    Procedure: HEMODIALYSIS CATHETER INSERTION;  Surgeon: Jean Carlos Guadalupe MD;  Location: [facility] OR;  Service: General;  Laterality: N/A;    LEG SURGERY           MEDICATIONS:  I have reviewed and reconciled the patients medication list in the patients chart at the skilled nursing facility today, 01/21/2025.      ALLERGIES:  Allergies   Allergen Reactions    Metformin And Related Anaphylaxis     HA, diarrhea, throat swelling    Metformin GI Intolerance         SOCIAL HISTORY:  Social History     Socioeconomic History    Marital status: Single   Tobacco Use    Smoking status: Never    Smokeless tobacco: Never   Vaping Use    Vaping status: Never Used   Substance and Sexual Activity    Alcohol use: No    Drug use: No    Sexual activity: Defer       FAMILY HISTORY:  Family History   Problem Relation Age of Onset    Asthma Mother     Hypertension Father     Stroke Father        REVIEW OF SYSTEMS:  Review of Systems   Constitutional:  Negative for chills, fatigue and fever.   HENT:  Negative for congestion, ear pain, rhinorrhea, sinus pressure and sore throat.    Eyes:  Negative for visual disturbance.  "  Respiratory:  Negative for cough, chest tightness, shortness of breath and wheezing.    Cardiovascular:  Negative for chest pain, palpitations and leg swelling.   Gastrointestinal:  Negative for abdominal pain, blood in stool, constipation, diarrhea, nausea and vomiting.   Endocrine: Negative for polydipsia and polyuria.   Genitourinary:  Negative for dysuria and hematuria.   Musculoskeletal:  Negative for arthralgias and back pain.   Skin:  Negative for rash.   Neurological:  Negative for dizziness, light-headedness, numbness and headaches.   Psychiatric/Behavioral:  Negative for dysphoric mood and sleep disturbance. The patient is not nervous/anxious.         PHYSICAL EXAMINATION:     VITAL SIGNS:  /73   Pulse 98   Temp 97.3 °F (36.3 °C)   Resp 18   Ht 167.6 cm (66\")   Wt 107 kg (235 lb 12.8 oz)   SpO2 96%   BMI 38.06 kg/m²     Physical Exam  Vitals and nursing note reviewed.   Constitutional:       Appearance: Normal appearance. She is well-developed. She is obese.   HENT:      Head: Normocephalic and atraumatic.      Nose: Nose normal.      Mouth/Throat:      Mouth: Mucous membranes are moist.      Pharynx: No oropharyngeal exudate.   Eyes:      General: No scleral icterus.     Conjunctiva/sclera: Conjunctivae normal.      Pupils: Pupils are equal, round, and reactive to light.   Neck:      Thyroid: No thyromegaly.   Cardiovascular:      Rate and Rhythm: Normal rate. Rhythm irregular.      Heart sounds: Normal heart sounds. No murmur heard.     No friction rub. No gallop.   Pulmonary:      Effort: Pulmonary effort is normal. No respiratory distress.      Breath sounds: Normal breath sounds. No wheezing.   Abdominal:      General: Bowel sounds are normal. There is no distension.      Palpations: Abdomen is soft.      Tenderness: There is no abdominal tenderness.   Musculoskeletal:         General: No deformity or signs of injury.      Cervical back: Normal range of motion and neck supple.      " Right lower leg: Edema present.      Left lower leg: Edema present.      Comments: Left upper extremity AV fistula    Lymphadenopathy:      Cervical: No cervical adenopathy.   Skin:     General: Skin is warm and dry.      Findings: No rash.   Neurological:      Mental Status: She is alert and oriented to person, place, and time.   Psychiatric:         Mood and Affect: Mood normal.         Behavior: Behavior normal.       RECORDS REVIEW:   Results  Laboratory Studies  CBC and CMP from 11/14/2024 were normal. A1c was 7.9. Blood sugar levels are mostly in the 100s.      ASSESSMENT     Diagnoses and all orders for this visit:    1. Type 2 diabetes mellitus with nephropathy (Primary)    2. ESRD (end stage renal disease)    3. Generalized anxiety disorder    4. Class 2 severe obesity due to excess calories with serious comorbidity and body mass index (BMI) of 38.0 to 38.9 in adult    5. Gastroesophageal reflux disease with esophagitis and hemorrhage    6. Essential hypertension    7. Acquired hypothyroidism    8. Chronic anemia        Assessment & Plan   Diabetes Mellitus.      Type 2 diabetes mellitus with hypoglycemia  -Continue Levemir  with sliding scale.  Glucose levels have been in good range recently.  - pt remains off ozempic at this time.     End-stage renal disease  - Continue hemodialysis as scheduled 3 times a week.  -CBC, CMP, A1c every 3 months.  -It is noted that nephrology preferred not to have adjustments to psychiatric medications due to concerns of behaviors during dialysis    Obesity  - monitor weights with discontinuing of Ozempic.  Patient advised to reduce high calorie foods.     Gastroesophageal Reflux Disease (GERD).  - controlled with PPI     Motion Sickness / Gastroparesis  She experiences nausea primarily during car rides. Zofran is available for use during transportation. Meclizine is also available for motion sickness if needed.    Anxiety  -Has been stable on Lexapro.     Bilateral Lower  Extremity Edema  -Fair control with elevation and compression     Acute blood loss anemia secondary to esophageal ulcers  -Cont iron supplements.  CBC being monitored by nephrology regularly.      History of chronic diarrhea  - less issues reported lately.     Essential hypertension  - Stable controlled with current medication regimen.     Atrial fibrillation  - Stable control of rhythm.  Continue cardiac medications.  - Continue Eliquis.     Hypothyroidism  - Continue current dose of Synthroid.  TSH is monitored every 3 months in facility.         [x]  Discussed Patient in detail with nursing/staff, addressed all needs today.     [x]  Plan of Care Reviewed   []  PT/OT Reviewed   []  Order Changes  []  Discharge Plans Reviewed  [x]  Advance Directive on file with Nursing Home.   [x]  POA on file with Nursing Home.    [x]  Code Status listed and reviewed.     I confirm accuracy of unchanged data/findings including physical exam and plan which have been carried forward from previous visit, as well as I have updated appropriately those that have changed        Rafiq Baron DO.  1/22/2025      Patient or patient representative verbalized consent for the use of Ambient Listening during the visit with  Rafiq Baron DO for chart documentation. 1/22/2025  11:16 EST

## 2025-02-06 DIAGNOSIS — F41.9 ANXIETY: Primary | ICD-10-CM

## 2025-02-06 RX ORDER — LORAZEPAM 0.5 MG/1
TABLET ORAL
Qty: 15 TABLET | Refills: 2 | Status: SHIPPED | OUTPATIENT
Start: 2025-02-06

## 2025-03-10 NOTE — PROGRESS NOTES
Nursing Home Progress Note        Rafiq Baron DO   [address] [place] [address] Phone: [phone]  Fax: [phone]     PATIENT NAME: Millicent Mckeon                                                                          YOB: 1960           DATE OF SERVICE: 03/11/2025  FACILITY:  [facility]      CHIEF COMPLAINT:  Chronic Medical Management    History of Present Illness  The patient is a 64-year-old female with a history of end-stage renal disease, hypothyroidism, anxiety, depression, GERD, and constipation, who was seen in the nursing facility today for routine medical management.    She has been taking her levothyroxine regularly for her hypothyroidism.  Labs are due currently.     She continues to take her insulin regimen along with Ozempic, which was started at our last visit. The patient is currently on 0.25 mg weekly.    Her mood and behaviors remain stable with her regimen of Lexapro and lorazepam. Per nephrology, these medications continue to allow her to participate in dialysis without agitation.    She reports overall good health, with the exception of a sore on her toe that has been present for approximately 2 to 3 days. The nursing staff has applied a topical treatment to the affected area.         PAST MEDICAL & SURGICAL HISTORY:   Past Medical History:   Diagnosis Date    A-fib     Acute gastric ulcer without hemorrhage or perforation     Allergic     Anemia 10/02/2018    CHF (congestive heart failure)     Chronic respiratory failure with hypoxia     Chronic systolic (congestive) heart failure     Chronic venous hypertension involving both sides     Diabetes mellitus     Disease of thyroid gland     Edema of both lower extremities     End-stage renal disease on hemodialysis     Esophagitis     Gastroparesis     GERD (gastroesophageal reflux disease)     Gout     History of atrial flutter     History of transfusion     Hyperlipidemia     Hypertension     Hypothyroidism      Impaired functional mobility and activity tolerance     Impaired functional mobility, balance, gait, and endurance     Loss of consciousness     Lymphadenopathy     Morbid obesity     Mucopurulent chronic bronchitis     Nocturnal hypoxia     Obstructive sleep apnea syndrome     Pneumonia     Pressure ulcer of right heel, unstageable     Pulmonary hypertension     Secondary hyperparathyroidism     Stage 3 chronic kidney disease     Tachycardia-bradycardia syndrome     Type 2 diabetes mellitus with diabetic chronic kidney disease     Unspecified severe protein-calorie malnutrition     Vitamin B 12 deficiency       Past Surgical History:   Procedure Laterality Date    ENDOSCOPY N/A 5/2/2022    Procedure: ESOPHAGOGASTRODUODENOSCOPY WITH BIOPSY;  Surgeon: Jacob Chance MD;  Location: [facility] ENDOSCOPY;  Service: Gastroenterology;  Laterality: N/A;    EYE SURGERY      INCISION AND DRAINAGE LEG Right 1/14/2019    Procedure: Right heel incision and drainage with graft application;  Surgeon: Ar Rueda DPM;  Location: [facility] OR;  Service: Podiatry    INSERTION HEMODIALYSIS CATHETER N/A 4/5/2022    Procedure: HEMODIALYSIS CATHETER INSERTION;  Surgeon: Jean Carlos Guadalupe MD;  Location: [facility] OR;  Service: General;  Laterality: N/A;    LEG SURGERY           MEDICATIONS:  I have reviewed and reconciled the patients medication list in the patients chart at the skilled nursing facility today, 03/11/2025.      ALLERGIES:  Allergies   Allergen Reactions    Metformin And Related Anaphylaxis     HA, diarrhea, throat swelling    Metformin GI Intolerance         SOCIAL HISTORY:  Social History     Socioeconomic History    Marital status: Single   Tobacco Use    Smoking status: Never    Smokeless tobacco: Never   Vaping Use    Vaping status: Never Used   Substance and Sexual Activity    Alcohol use: No    Drug use: No    Sexual activity: Defer       FAMILY HISTORY:  Family History   Problem Relation Age of Onset     "Asthma Mother     Hypertension Father     Stroke Father        REVIEW OF SYSTEMS:  Review of Systems   Constitutional:  Negative for chills, fatigue and fever.   HENT:  Negative for congestion, ear pain, rhinorrhea, sinus pressure and sore throat.    Eyes:  Negative for visual disturbance.   Respiratory:  Negative for cough, chest tightness, shortness of breath and wheezing.    Cardiovascular:  Negative for chest pain, palpitations and leg swelling.   Gastrointestinal:  Negative for abdominal pain, blood in stool, constipation, diarrhea, nausea and vomiting.   Endocrine: Negative for polydipsia and polyuria.   Genitourinary:  Negative for dysuria and hematuria.   Musculoskeletal:  Negative for arthralgias and back pain.   Skin:  Negative for rash.   Neurological:  Negative for dizziness, light-headedness, numbness and headaches.   Psychiatric/Behavioral:  Negative for dysphoric mood and sleep disturbance. The patient is not nervous/anxious.         PHYSICAL EXAMINATION:     VITAL SIGNS:  /63   Pulse 102   Temp 97.9 °F (36.6 °C)   Resp 18   Ht 167.6 cm (66\")   Wt 109 kg (240 lb 6.4 oz)   SpO2 96%   BMI 38.80 kg/m²     Physical Exam  Vitals and nursing note reviewed.   Constitutional:       Appearance: Normal appearance. She is well-developed. She is obese.   HENT:      Head: Normocephalic and atraumatic.      Nose: Nose normal.      Mouth/Throat:      Mouth: Mucous membranes are moist.      Pharynx: No oropharyngeal exudate.   Eyes:      General: No scleral icterus.     Conjunctiva/sclera: Conjunctivae normal.      Pupils: Pupils are equal, round, and reactive to light.   Neck:      Thyroid: No thyromegaly.   Cardiovascular:      Rate and Rhythm: Normal rate. Rhythm irregular.      Heart sounds: Normal heart sounds. No murmur heard.     No friction rub. No gallop.   Pulmonary:      Effort: Pulmonary effort is normal. No respiratory distress.      Breath sounds: Normal breath sounds. No wheezing. "   Abdominal:      General: Bowel sounds are normal. There is no distension.      Palpations: Abdomen is soft.      Tenderness: There is no abdominal tenderness.   Musculoskeletal:         General: No deformity or signs of injury.      Cervical back: Normal range of motion and neck supple.      Right lower leg: Edema present.      Left lower leg: Edema present.      Comments: Left upper extremity AV fistula    Lymphadenopathy:      Cervical: No cervical adenopathy.   Skin:     General: Skin is warm and dry.      Findings: No rash.      Comments: Right great toe with 3cm round sore under the nail, no drainage or peripheral redness, wound is improving   Neurological:      Mental Status: She is alert and oriented to person, place, and time.   Psychiatric:         Mood and Affect: Mood normal.         Behavior: Behavior normal.       RECORDS REVIEW:   Results  Laboratory Studies  Average blood sugar is in the 200s, ranging from 175 to 300.      ASSESSMENT     Diagnoses and all orders for this visit:    1. Type 2 diabetes mellitus with nephropathy (Primary)    2. ESRD (end stage renal disease)    3. Generalized anxiety disorder    4. Class 2 severe obesity due to excess calories with serious comorbidity and body mass index (BMI) of 38.0 to 38.9 in adult    5. Gastroesophageal reflux disease with esophagitis and hemorrhage    6. Essential hypertension    7. Acquired hypothyroidism    8. Chronic anemia        Assessment & Plan          Anxiety and depression.  Her mood and behaviors remain stable with her current regimen of Lexapro and lorazepam.  Attempts have been made for GDR in the past however this led to behavior issues which limited her ability to participate in dialysis.  Per nephrology request, medications will be maintained at current dosages.       Diabetes mellitus.  Her blood glucose levels have not significantly decreased, remaining in the range of 175 to 300, with an average around 220. The dosage of Ozempic  will be increased from 0.25 mg to 0.5 mg weekly. She is advised to avoid excessive snacking and candy consumption, and to drink water instead of sugary beverages.    Right great Toe sore.  A small sore is present under the nail. Triple antibiotic ointment and gauze will be used for treatment. The sore will be closely monitored for any signs of extension.    End-stage renal disease  - Continue hemodialysis as scheduled 3 times a week.  -CBC, CMP, A1c every 3 months.  -It is noted that nephrology preferred not to have adjustments to psychiatric medications due to concerns of behaviors during dialysis    Obesity  - monitor weights with discontinuing of Ozempic.  Patient advised to reduce high calorie foods.     Gastroesophageal Reflux Disease (GERD).  - controlled with PPI     Motion Sickness / Gastroparesis  She experiences nausea primarily during car rides. Zofran is available for use during transportation. Meclizine is also available for motion sickness if needed.    Bilateral Lower Extremity Edema  -Fair control with elevation and compression     Acute blood loss anemia secondary to esophageal ulcers  -Cont iron supplements.  CBC being monitored by nephrology regularly.      History of chronic diarrhea  - less issues reported lately.     Essential hypertension  - Stable controlled with current medication regimen.     Atrial fibrillation  - Stable control of rhythm.  Continue cardiac medications.  - Continue Eliquis.     Hypothyroidism  - Continue current dose of Synthroid.  TSH is monitored every 3 months in facility.         [x]  Discussed Patient in detail with nursing/staff, addressed all needs today.     [x]  Plan of Care Reviewed   []  PT/OT Reviewed   [x]  Order Changes  []  Discharge Plans Reviewed  [x]  Advance Directive on file with Nursing Home.   [x]  POA on file with Nursing Home.    [x]  Code Status listed and reviewed.     I confirm accuracy of unchanged data/findings including physical exam and plan  which have been carried forward from previous visit, as well as I have updated appropriately those that have changed        Rafiq Baron DO.  3/12/2025      Patient or patient representative verbalized consent for the use of Ambient Listening during the visit with  Rafiq Baron DO for chart documentation. 3/12/2025  17:00 EDT

## 2025-03-11 ENCOUNTER — NURSING HOME (OUTPATIENT)
Dept: INTERNAL MEDICINE | Facility: CLINIC | Age: 65
End: 2025-03-11
Payer: MEDICARE

## 2025-03-11 VITALS
HEIGHT: 66 IN | OXYGEN SATURATION: 96 % | HEART RATE: 102 BPM | WEIGHT: 240.4 LBS | SYSTOLIC BLOOD PRESSURE: 131 MMHG | BODY MASS INDEX: 38.63 KG/M2 | TEMPERATURE: 97.9 F | RESPIRATION RATE: 18 BRPM | DIASTOLIC BLOOD PRESSURE: 63 MMHG

## 2025-03-11 DIAGNOSIS — E66.01 CLASS 2 SEVERE OBESITY DUE TO EXCESS CALORIES WITH SERIOUS COMORBIDITY AND BODY MASS INDEX (BMI) OF 38.0 TO 38.9 IN ADULT: ICD-10-CM

## 2025-03-11 DIAGNOSIS — E66.812 CLASS 2 SEVERE OBESITY DUE TO EXCESS CALORIES WITH SERIOUS COMORBIDITY AND BODY MASS INDEX (BMI) OF 38.0 TO 38.9 IN ADULT: ICD-10-CM

## 2025-03-11 DIAGNOSIS — D64.9 CHRONIC ANEMIA: ICD-10-CM

## 2025-03-11 DIAGNOSIS — I10 ESSENTIAL HYPERTENSION: ICD-10-CM

## 2025-03-11 DIAGNOSIS — N18.6 ESRD (END STAGE RENAL DISEASE): ICD-10-CM

## 2025-03-11 DIAGNOSIS — F41.1 GENERALIZED ANXIETY DISORDER: ICD-10-CM

## 2025-03-11 DIAGNOSIS — K21.01 GASTROESOPHAGEAL REFLUX DISEASE WITH ESOPHAGITIS AND HEMORRHAGE: ICD-10-CM

## 2025-03-11 DIAGNOSIS — E03.9 ACQUIRED HYPOTHYROIDISM: ICD-10-CM

## 2025-03-11 DIAGNOSIS — E11.21 TYPE 2 DIABETES MELLITUS WITH NEPHROPATHY: Primary | ICD-10-CM

## 2025-03-11 NOTE — LETTER
Nursing Home Progress Note        Rafiq Baron DO   [address] [place] [address] Phone: [phone]  Fax: [phone]     PATIENT NAME: Millicent Mckeon                                                                          YOB: 1960           DATE OF SERVICE: 03/11/2025  FACILITY:  [facility]      CHIEF COMPLAINT:  Chronic Medical Management    History of Present Illness  The patient is a 64-year-old female with a history of end-stage renal disease, hypothyroidism, anxiety, depression, GERD, and constipation, who was seen in the nursing facility today for routine medical management.    She has been taking her levothyroxine regularly for her hypothyroidism.  Labs are due currently.     She continues to take her insulin regimen along with Ozempic, which was started at our last visit. The patient is currently on 0.25 mg weekly.    Her mood and behaviors remain stable with her regimen of Lexapro and lorazepam. Per nephrology, these medications continue to allow her to participate in dialysis without agitation.    She reports overall good health, with the exception of a sore on her toe that has been present for approximately 2 to 3 days. The nursing staff has applied a topical treatment to the affected area.         PAST MEDICAL & SURGICAL HISTORY:   Past Medical History:   Diagnosis Date   • A-fib    • Acute gastric ulcer without hemorrhage or perforation    • Allergic    • Anemia 10/02/2018   • CHF (congestive heart failure)    • Chronic respiratory failure with hypoxia    • Chronic systolic (congestive) heart failure    • Chronic venous hypertension involving both sides    • Diabetes mellitus    • Disease of thyroid gland    • Edema of both lower extremities    • End-stage renal disease on hemodialysis    • Esophagitis    • Gastroparesis    • GERD (gastroesophageal reflux disease)    • Gout    • History of atrial flutter    • History of transfusion    • Hyperlipidemia    •  Hypertension    • Hypothyroidism    • Impaired functional mobility and activity tolerance    • Impaired functional mobility, balance, gait, and endurance    • Loss of consciousness    • Lymphadenopathy    • Morbid obesity    • Mucopurulent chronic bronchitis    • Nocturnal hypoxia    • Obstructive sleep apnea syndrome    • Pneumonia    • Pressure ulcer of right heel, unstageable    • Pulmonary hypertension    • Secondary hyperparathyroidism    • Stage 3 chronic kidney disease    • Tachycardia-bradycardia syndrome    • Type 2 diabetes mellitus with diabetic chronic kidney disease    • Unspecified severe protein-calorie malnutrition    • Vitamin B 12 deficiency       Past Surgical History:   Procedure Laterality Date   • ENDOSCOPY N/A 5/2/2022    Procedure: ESOPHAGOGASTRODUODENOSCOPY WITH BIOPSY;  Surgeon: Jacob Chance MD;  Location: [facility] ENDOSCOPY;  Service: Gastroenterology;  Laterality: N/A;   • EYE SURGERY     • INCISION AND DRAINAGE LEG Right 1/14/2019    Procedure: Right heel incision and drainage with graft application;  Surgeon: Ar Rueda DPM;  Location: [facility] OR;  Service: Podiatry   • INSERTION HEMODIALYSIS CATHETER N/A 4/5/2022    Procedure: HEMODIALYSIS CATHETER INSERTION;  Surgeon: Jean Carlos Guadalupe MD;  Location: [facility] OR;  Service: General;  Laterality: N/A;   • LEG SURGERY           MEDICATIONS:  I have reviewed and reconciled the patients medication list in the patients chart at the skilled nursing facility today, 03/11/2025.      ALLERGIES:  Allergies   Allergen Reactions   • Metformin And Related Anaphylaxis     HA, diarrhea, throat swelling   • Metformin GI Intolerance         SOCIAL HISTORY:  Social History     Socioeconomic History   • Marital status: Single   Tobacco Use   • Smoking status: Never   • Smokeless tobacco: Never   Vaping Use   • Vaping status: Never Used   Substance and Sexual Activity   • Alcohol use: No   • Drug use: No   • Sexual activity: Defer  "      FAMILY HISTORY:  Family History   Problem Relation Age of Onset   • Asthma Mother    • Hypertension Father    • Stroke Father        REVIEW OF SYSTEMS:  Review of Systems   Constitutional:  Negative for chills, fatigue and fever.   HENT:  Negative for congestion, ear pain, rhinorrhea, sinus pressure and sore throat.    Eyes:  Negative for visual disturbance.   Respiratory:  Negative for cough, chest tightness, shortness of breath and wheezing.    Cardiovascular:  Negative for chest pain, palpitations and leg swelling.   Gastrointestinal:  Negative for abdominal pain, blood in stool, constipation, diarrhea, nausea and vomiting.   Endocrine: Negative for polydipsia and polyuria.   Genitourinary:  Negative for dysuria and hematuria.   Musculoskeletal:  Negative for arthralgias and back pain.   Skin:  Negative for rash.   Neurological:  Negative for dizziness, light-headedness, numbness and headaches.   Psychiatric/Behavioral:  Negative for dysphoric mood and sleep disturbance. The patient is not nervous/anxious.         PHYSICAL EXAMINATION:     VITAL SIGNS:  /63   Pulse 102   Temp 97.9 °F (36.6 °C)   Resp 18   Ht 167.6 cm (66\")   Wt 109 kg (240 lb 6.4 oz)   SpO2 96%   BMI 38.80 kg/m²     Physical Exam  Vitals and nursing note reviewed.   Constitutional:       Appearance: Normal appearance. She is well-developed. She is obese.   HENT:      Head: Normocephalic and atraumatic.      Nose: Nose normal.      Mouth/Throat:      Mouth: Mucous membranes are moist.      Pharynx: No oropharyngeal exudate.   Eyes:      General: No scleral icterus.     Conjunctiva/sclera: Conjunctivae normal.      Pupils: Pupils are equal, round, and reactive to light.   Neck:      Thyroid: No thyromegaly.   Cardiovascular:      Rate and Rhythm: Normal rate. Rhythm irregular.      Heart sounds: Normal heart sounds. No murmur heard.     No friction rub. No gallop.   Pulmonary:      Effort: Pulmonary effort is normal. No " respiratory distress.      Breath sounds: Normal breath sounds. No wheezing.   Abdominal:      General: Bowel sounds are normal. There is no distension.      Palpations: Abdomen is soft.      Tenderness: There is no abdominal tenderness.   Musculoskeletal:         General: No deformity or signs of injury.      Cervical back: Normal range of motion and neck supple.      Right lower leg: Edema present.      Left lower leg: Edema present.      Comments: Left upper extremity AV fistula    Lymphadenopathy:      Cervical: No cervical adenopathy.   Skin:     General: Skin is warm and dry.      Findings: No rash.      Comments: Right great toe with 3cm round sore under the nail, no drainage or peripheral redness, wound is improving   Neurological:      Mental Status: She is alert and oriented to person, place, and time.   Psychiatric:         Mood and Affect: Mood normal.         Behavior: Behavior normal.       RECORDS REVIEW:   Results  Laboratory Studies  Average blood sugar is in the 200s, ranging from 175 to 300.      ASSESSMENT     Diagnoses and all orders for this visit:    1. Type 2 diabetes mellitus with nephropathy (Primary)    2. ESRD (end stage renal disease)    3. Generalized anxiety disorder    4. Class 2 severe obesity due to excess calories with serious comorbidity and body mass index (BMI) of 38.0 to 38.9 in adult    5. Gastroesophageal reflux disease with esophagitis and hemorrhage    6. Essential hypertension    7. Acquired hypothyroidism    8. Chronic anemia        Assessment & Plan          Anxiety and depression.  Her mood and behaviors remain stable with her current regimen of Lexapro and lorazepam.  Attempts have been made for GDR in the past however this led to behavior issues which limited her ability to participate in dialysis.  Per nephrology request, medications will be maintained at current dosages.       Diabetes mellitus.  Her blood glucose levels have not significantly decreased, remaining  in the range of 175 to 300, with an average around 220. The dosage of Ozempic will be increased from 0.25 mg to 0.5 mg weekly. She is advised to avoid excessive snacking and candy consumption, and to drink water instead of sugary beverages.    Right great Toe sore.  A small sore is present under the nail. Triple antibiotic ointment and gauze will be used for treatment. The sore will be closely monitored for any signs of extension.    End-stage renal disease  - Continue hemodialysis as scheduled 3 times a week.  -CBC, CMP, A1c every 3 months.  -It is noted that nephrology preferred not to have adjustments to psychiatric medications due to concerns of behaviors during dialysis    Obesity  - monitor weights with discontinuing of Ozempic.  Patient advised to reduce high calorie foods.     Gastroesophageal Reflux Disease (GERD).  - controlled with PPI     Motion Sickness / Gastroparesis  She experiences nausea primarily during car rides. Zofran is available for use during transportation. Meclizine is also available for motion sickness if needed.    Bilateral Lower Extremity Edema  -Fair control with elevation and compression     Acute blood loss anemia secondary to esophageal ulcers  -Cont iron supplements.  CBC being monitored by nephrology regularly.      History of chronic diarrhea  - less issues reported lately.     Essential hypertension  - Stable controlled with current medication regimen.     Atrial fibrillation  - Stable control of rhythm.  Continue cardiac medications.  - Continue Eliquis.     Hypothyroidism  - Continue current dose of Synthroid.  TSH is monitored every 3 months in facility.         [x]  Discussed Patient in detail with nursing/staff, addressed all needs today.     [x]  Plan of Care Reviewed   []  PT/OT Reviewed   [x]  Order Changes  []  Discharge Plans Reviewed  [x]  Advance Directive on file with Nursing Home.   [x]  POA on file with Nursing Home.    [x]  Code Status listed and reviewed.     I  confirm accuracy of unchanged data/findings including physical exam and plan which have been carried forward from previous visit, as well as I have updated appropriately those that have changed        Rafiq Baron DO.  3/12/2025      Patient or patient representative verbalized consent for the use of Ambient Listening during the visit with  Rafiq Baron DO for chart documentation. 3/12/2025  17:00 EDT

## 2025-03-18 ENCOUNTER — NURSING HOME (OUTPATIENT)
Dept: INTERNAL MEDICINE | Facility: CLINIC | Age: 65
End: 2025-03-18
Payer: MEDICARE

## 2025-03-18 DIAGNOSIS — F41.1 GENERALIZED ANXIETY DISORDER: ICD-10-CM

## 2025-03-18 DIAGNOSIS — E66.01 CLASS 2 SEVERE OBESITY DUE TO EXCESS CALORIES WITH SERIOUS COMORBIDITY AND BODY MASS INDEX (BMI) OF 38.0 TO 38.9 IN ADULT: ICD-10-CM

## 2025-03-18 DIAGNOSIS — E03.9 ACQUIRED HYPOTHYROIDISM: ICD-10-CM

## 2025-03-18 DIAGNOSIS — I10 ESSENTIAL HYPERTENSION: ICD-10-CM

## 2025-03-18 DIAGNOSIS — F32.A ANXIETY AND DEPRESSION: ICD-10-CM

## 2025-03-18 DIAGNOSIS — K21.01 GASTROESOPHAGEAL REFLUX DISEASE WITH ESOPHAGITIS AND HEMORRHAGE: ICD-10-CM

## 2025-03-18 DIAGNOSIS — R60.0 BILATERAL EDEMA OF LOWER EXTREMITY: ICD-10-CM

## 2025-03-18 DIAGNOSIS — E66.812 CLASS 2 SEVERE OBESITY DUE TO EXCESS CALORIES WITH SERIOUS COMORBIDITY AND BODY MASS INDEX (BMI) OF 38.0 TO 38.9 IN ADULT: ICD-10-CM

## 2025-03-18 DIAGNOSIS — F41.9 ANXIETY AND DEPRESSION: ICD-10-CM

## 2025-03-18 DIAGNOSIS — N18.6 ESRD (END STAGE RENAL DISEASE): ICD-10-CM

## 2025-03-18 DIAGNOSIS — L03.032 CELLULITIS OF GREAT TOE OF LEFT FOOT: Primary | ICD-10-CM

## 2025-03-18 DIAGNOSIS — I50.32 CHRONIC DIASTOLIC CONGESTIVE HEART FAILURE: ICD-10-CM

## 2025-03-18 DIAGNOSIS — E11.21 TYPE 2 DIABETES MELLITUS WITH NEPHROPATHY: ICD-10-CM

## 2025-03-18 NOTE — LETTER
Nursing Home Progress Note        Rafiq Baron DO   [address] [place] [address] Phone: [phone]  Fax: [phone]     PATIENT NAME: Millicent Mckeon                                                                          YOB: 1960           DATE OF SERVICE: 03/18/2025  FACILITY:  [facility]      CHIEF COMPLAINT:  Left great toe cellulitis/wound    History of Present Illness  Garry 64-year-old female with a history of end-stage renal disease, type 2 diabetes mellitus, and hypertension who was seen in the facility for acute visit with concerns of left great toe redness and swelling.  It is noted that at my last visit with her, patient had developed a small wound under the medial portion of the nail.  Patient was getting appropriate dressing however nurse reports an episode where the patient had bathed and left the bandages wet.  After unwrapping the bandages, patient was noted to have significant redness and increased swelling.  X-rays of the toe were ordered on 3/17/2025 and results were reviewed today.  I was also notified about this lesion and wound cultures were ordered.  Results are pending at this time.       PAST MEDICAL & SURGICAL HISTORY:   Past Medical History:   Diagnosis Date   • A-fib    • Acute gastric ulcer without hemorrhage or perforation    • Allergic    • Anemia 10/02/2018   • CHF (congestive heart failure)    • Chronic respiratory failure with hypoxia    • Chronic systolic (congestive) heart failure    • Chronic venous hypertension involving both sides    • Diabetes mellitus    • Disease of thyroid gland    • Edema of both lower extremities    • End-stage renal disease on hemodialysis    • Esophagitis    • Gastroparesis    • GERD (gastroesophageal reflux disease)    • Gout    • History of atrial flutter    • History of transfusion    • Hyperlipidemia    • Hypertension    • Hypothyroidism    • Impaired functional mobility and activity tolerance    • Impaired  functional mobility, balance, gait, and endurance    • Loss of consciousness    • Lymphadenopathy    • Morbid obesity    • Mucopurulent chronic bronchitis    • Nocturnal hypoxia    • Obstructive sleep apnea syndrome    • Pneumonia    • Pressure ulcer of right heel, unstageable    • Pulmonary hypertension    • Secondary hyperparathyroidism    • Stage 3 chronic kidney disease    • Tachycardia-bradycardia syndrome    • Type 2 diabetes mellitus with diabetic chronic kidney disease    • Unspecified severe protein-calorie malnutrition    • Vitamin B 12 deficiency       Past Surgical History:   Procedure Laterality Date   • ENDOSCOPY N/A 5/2/2022    Procedure: ESOPHAGOGASTRODUODENOSCOPY WITH BIOPSY;  Surgeon: Jacob Chance MD;  Location: [facility] ENDOSCOPY;  Service: Gastroenterology;  Laterality: N/A;   • EYE SURGERY     • INCISION AND DRAINAGE LEG Right 1/14/2019    Procedure: Right heel incision and drainage with graft application;  Surgeon: Ar Rueda DPM;  Location: [facility] OR;  Service: Podiatry   • INSERTION HEMODIALYSIS CATHETER N/A 4/5/2022    Procedure: HEMODIALYSIS CATHETER INSERTION;  Surgeon: Jean Carlos Guadaulpe MD;  Location: [facility] OR;  Service: General;  Laterality: N/A;   • LEG SURGERY           MEDICATIONS:  I have reviewed and reconciled the patients medication list in the patients chart at the [facility] nursing [facility] today, 03/18/2025.      ALLERGIES:  Allergies   Allergen Reactions   • Metformin And Related Anaphylaxis     HA, diarrhea, throat swelling   • Metformin GI Intolerance         SOCIAL HISTORY:  Social History     Socioeconomic History   • Marital status: Single   Tobacco Use   • Smoking status: Never   • Smokeless tobacco: Never   Vaping Use   • Vaping status: Never Used   Substance and Sexual Activity   • Alcohol use: No   • Drug use: No   • Sexual activity: Defer       FAMILY HISTORY:  Family History   Problem Relation Age of Onset   • Asthma Mother    • Hypertension Father   "  • Stroke Father        REVIEW OF SYSTEMS:  Review of Systems   Constitutional:  Negative for chills, fatigue and fever.   HENT:  Negative for congestion, ear pain, rhinorrhea, sinus pressure and sore throat.    Eyes:  Negative for visual disturbance.   Respiratory:  Negative for cough, chest tightness, shortness of breath and wheezing.    Cardiovascular:  Negative for chest pain, palpitations and leg swelling.   Gastrointestinal:  Negative for abdominal pain, blood in stool, constipation, diarrhea, nausea and vomiting.   Endocrine: Negative for polydipsia and polyuria.   Genitourinary:  Negative for dysuria and hematuria.   Musculoskeletal:  Negative for arthralgias and back pain.   Skin:  Negative for rash.   Neurological:  Negative for dizziness, light-headedness, numbness and headaches.   Psychiatric/Behavioral:  Negative for dysphoric mood and sleep disturbance. The patient is not nervous/anxious.         PHYSICAL EXAMINATION:     VITAL SIGNS:  /82   Pulse 110   Temp 97.4 °F (36.3 °C)   Resp 18   Ht 167.6 cm (66\")   Wt 108 kg (237 lb 9.6 oz)   SpO2 97%   BMI 38.35 kg/m²     Physical Exam  Vitals and nursing note reviewed.   Constitutional:       Appearance: Normal appearance. She is well-developed. She is obese.   HENT:      Head: Normocephalic and atraumatic.      Nose: Nose normal.      Mouth/Throat:      Mouth: Mucous membranes are moist.      Pharynx: No oropharyngeal exudate.   Eyes:      General: No scleral icterus.     Conjunctiva/sclera: Conjunctivae normal.      Pupils: Pupils are equal, round, and reactive to light.   Neck:      Thyroid: No thyromegaly.   Cardiovascular:      Rate and Rhythm: Normal rate. Rhythm irregular.      Heart sounds: Normal heart sounds. No murmur heard.     No friction rub. No gallop.   Pulmonary:      Effort: Pulmonary effort is normal. No respiratory distress.      Breath sounds: Normal breath sounds. No wheezing.   Abdominal:      General: Bowel sounds are " normal. There is no distension.      Palpations: Abdomen is soft.      Tenderness: There is no abdominal tenderness.   Musculoskeletal:         General: No deformity or signs of injury.      Cervical back: Normal range of motion and neck supple.      Right lower leg: Edema present.      Left lower leg: Edema present.      Comments: Left upper extremity AV fistula.  Left great toe swelling and redness with 1 cm round wound under the nail medially   Lymphadenopathy:      Cervical: No cervical adenopathy.   Skin:     General: Skin is warm and dry.      Findings: No rash.      Comments: Right great toe with 3cm round sore under the nail, no drainage or peripheral redness, wound is improving   Neurological:      Mental Status: She is alert and oriented to person, place, and time.   Psychiatric:         Mood and Affect: Mood normal.         Behavior: Behavior normal.       RECORDS REVIEW:   Results  Wound cultures pending.  Left great toe x-ray presented no signs of fracture or osteomyelitis.      ASSESSMENT     Diagnoses and all orders for this visit:    1. Cellulitis of great toe of left foot (Primary)    2. Anxiety and depression    3. Type 2 diabetes mellitus with nephropathy    4. ESRD (end stage renal disease)    5. Class 2 severe obesity due to excess calories with serious comorbidity and body mass index (BMI) of 38.0 to 38.9 in adult    6. Generalized anxiety disorder    7. Gastroesophageal reflux disease with esophagitis and hemorrhage    8. Essential hypertension    9. Acquired hypothyroidism    10. Chronic diastolic congestive heart failure    11. Bilateral edema of lower extremity        Assessment & Plan      Left great toe cellulitis  -X-rays were negative for osteomyelitis or fracture.  - Cultures are pending at this time.  - Start doxycycline 100 mg p.o. twice daily x 10 days.  * not in my previous note I wrote right toe, this was incorrect, left toe is the location of the wound.     Anxiety and  depression.  Her mood and behaviors remain stable with her current regimen of Lexapro and lorazepam.  Attempts have been made for GDR in the past however this led to behavior issues which limited her ability to participate in dialysis.  Per nephrology request, medications will be maintained at current dosages.       Diabetes mellitus.  Her blood glucose levels have not significantly decreased, remaining in the range of 175 to 300, with an average around 220. The dosage of Ozempic will be increased from 0.25 mg to 0.5 mg weekly. She is advised to avoid excessive snacking and candy consumption, and to drink water instead of sugary beverages.    End-stage renal disease  - Continue hemodialysis as scheduled 3 times a week.  -CBC, CMP, A1c every 3 months.  -It is noted that nephrology preferred not to have adjustments to psychiatric medications due to concerns of behaviors during dialysis    Obesity  - monitor weights with discontinuing of Ozempic.  Patient advised to reduce high calorie foods.     Gastroesophageal Reflux Disease (GERD).  - controlled with PPI     Motion Sickness / Gastroparesis  She experiences nausea primarily during car rides. Zofran is available for use during transportation. Meclizine is also available for motion sickness if needed.    Bilateral Lower Extremity Edema  -Fair control with elevation and compression     Acute blood loss anemia secondary to esophageal ulcers  -Cont iron supplements.  CBC being monitored by nephrology regularly.      History of chronic diarrhea  - less issues reported lately.     Essential hypertension  - Stable controlled with current medication regimen.     Atrial fibrillation  - Stable control of rhythm.  Continue cardiac medications.  - Continue Eliquis.     Hypothyroidism  - Continue current dose of Synthroid.  TSH is monitored every 3 months in facility.         [x]  Discussed Patient in detail with nursing/staff, addressed all needs today.     [x]  Plan of Care  Reviewed   []  PT/OT Reviewed   [x]  Order Changes  []  Discharge Plans Reviewed  [x]  Advance Directive on file with Nursing Home.   [x]  POA on file with Nursing Home.    [x]  Code Status listed and reviewed.     I confirm accuracy of unchanged data/findings including physical exam and plan which have been carried forward from previous visit, as well as I have updated appropriately those that have changed        Rafiq Baron DO.  3/19/2025      Patient or patient representative verbalized consent for the use of Ambient Listening during the visit with  Rafiq Baron DO for chart documentation. 3/19/2025  09:51 EDT

## 2025-03-19 VITALS
HEART RATE: 110 BPM | OXYGEN SATURATION: 97 % | TEMPERATURE: 97.4 F | BODY MASS INDEX: 38.18 KG/M2 | WEIGHT: 237.6 LBS | SYSTOLIC BLOOD PRESSURE: 133 MMHG | RESPIRATION RATE: 18 BRPM | HEIGHT: 66 IN | DIASTOLIC BLOOD PRESSURE: 82 MMHG

## 2025-03-19 NOTE — PROGRESS NOTES
Nursing Home Progress Note        Rafiq Baron DO   [address] [place] [address] Phone: [phone]  Fax: [phone]     PATIENT NAME: Millicent Mckeon                                                                          YOB: 1960           DATE OF SERVICE: 03/18/2025  FACILITY:  [facility]      CHIEF COMPLAINT:  Left great toe cellulitis/wound    History of Present Illness  Garry 64-year-old female with a history of end-stage renal disease, type 2 diabetes mellitus, and hypertension who was seen in the facility for acute visit with concerns of left great toe redness and swelling.  It is noted that at my last visit with her, patient had developed a small wound under the medial portion of the nail.  Patient was getting appropriate dressing however nurse reports an episode where the patient had bathed and left the bandages wet.  After unwrapping the bandages, patient was noted to have significant redness and increased swelling.  X-rays of the toe were ordered on 3/17/2025 and results were reviewed today.  I was also notified about this lesion and wound cultures were ordered.  Results are pending at this time.       PAST MEDICAL & SURGICAL HISTORY:   Past Medical History:   Diagnosis Date    A-fib     Acute gastric ulcer without hemorrhage or perforation     Allergic     Anemia 10/02/2018    CHF (congestive heart failure)     Chronic respiratory failure with hypoxia     Chronic systolic (congestive) heart failure     Chronic venous hypertension involving both sides     Diabetes mellitus     Disease of thyroid gland     Edema of both lower extremities     End-stage renal disease on hemodialysis     Esophagitis     Gastroparesis     GERD (gastroesophageal reflux disease)     Gout     History of atrial flutter     History of transfusion     Hyperlipidemia     Hypertension     Hypothyroidism     Impaired functional mobility and activity tolerance     Impaired functional mobility,  balance, gait, and endurance     Loss of consciousness     Lymphadenopathy     Morbid obesity     Mucopurulent chronic bronchitis     Nocturnal hypoxia     Obstructive sleep apnea syndrome     Pneumonia     Pressure ulcer of right heel, unstageable     Pulmonary hypertension     Secondary hyperparathyroidism     Stage 3 chronic kidney disease     Tachycardia-bradycardia syndrome     Type 2 diabetes mellitus with diabetic chronic kidney disease     Unspecified severe protein-calorie malnutrition     Vitamin B 12 deficiency       Past Surgical History:   Procedure Laterality Date    ENDOSCOPY N/A 5/2/2022    Procedure: ESOPHAGOGASTRODUODENOSCOPY WITH BIOPSY;  Surgeon: Jacob Chance MD;  Location: [facility] ENDOSCOPY;  Service: Gastroenterology;  Laterality: N/A;    EYE SURGERY      INCISION AND DRAINAGE LEG Right 1/14/2019    Procedure: Right heel incision and drainage with graft application;  Surgeon: Ar Rueda DPM;  Location: [facility] OR;  Service: Podiatry    INSERTION HEMODIALYSIS CATHETER N/A 4/5/2022    Procedure: HEMODIALYSIS CATHETER INSERTION;  Surgeon: Jean Carlos Guadalupe MD;  Location: [facility] OR;  Service: General;  Laterality: N/A;    LEG SURGERY           MEDICATIONS:  I have reviewed and reconciled the patients medication list in the patients chart at the skilled nursing facility today, 03/18/2025.      ALLERGIES:  Allergies   Allergen Reactions    Metformin And Related Anaphylaxis     HA, diarrhea, throat swelling    Metformin GI Intolerance         SOCIAL HISTORY:  Social History     Socioeconomic History    Marital status: Single   Tobacco Use    Smoking status: Never    Smokeless tobacco: Never   Vaping Use    Vaping status: Never Used   Substance and Sexual Activity    Alcohol use: No    Drug use: No    Sexual activity: Defer       FAMILY HISTORY:  Family History   Problem Relation Age of Onset    Asthma Mother     Hypertension Father     Stroke Father        REVIEW OF SYSTEMS:  Review of  "Systems   Constitutional:  Negative for chills, fatigue and fever.   HENT:  Negative for congestion, ear pain, rhinorrhea, sinus pressure and sore throat.    Eyes:  Negative for visual disturbance.   Respiratory:  Negative for cough, chest tightness, shortness of breath and wheezing.    Cardiovascular:  Negative for chest pain, palpitations and leg swelling.   Gastrointestinal:  Negative for abdominal pain, blood in stool, constipation, diarrhea, nausea and vomiting.   Endocrine: Negative for polydipsia and polyuria.   Genitourinary:  Negative for dysuria and hematuria.   Musculoskeletal:  Negative for arthralgias and back pain.   Skin:  Negative for rash.   Neurological:  Negative for dizziness, light-headedness, numbness and headaches.   Psychiatric/Behavioral:  Negative for dysphoric mood and sleep disturbance. The patient is not nervous/anxious.         PHYSICAL EXAMINATION:     VITAL SIGNS:  /82   Pulse 110   Temp 97.4 °F (36.3 °C)   Resp 18   Ht 167.6 cm (66\")   Wt 108 kg (237 lb 9.6 oz)   SpO2 97%   BMI 38.35 kg/m²     Physical Exam  Vitals and nursing note reviewed.   Constitutional:       Appearance: Normal appearance. She is well-developed. She is obese.   HENT:      Head: Normocephalic and atraumatic.      Nose: Nose normal.      Mouth/Throat:      Mouth: Mucous membranes are moist.      Pharynx: No oropharyngeal exudate.   Eyes:      General: No scleral icterus.     Conjunctiva/sclera: Conjunctivae normal.      Pupils: Pupils are equal, round, and reactive to light.   Neck:      Thyroid: No thyromegaly.   Cardiovascular:      Rate and Rhythm: Normal rate. Rhythm irregular.      Heart sounds: Normal heart sounds. No murmur heard.     No friction rub. No gallop.   Pulmonary:      Effort: Pulmonary effort is normal. No respiratory distress.      Breath sounds: Normal breath sounds. No wheezing.   Abdominal:      General: Bowel sounds are normal. There is no distension.      Palpations: Abdomen " is soft.      Tenderness: There is no abdominal tenderness.   Musculoskeletal:         General: No deformity or signs of injury.      Cervical back: Normal range of motion and neck supple.      Right lower leg: Edema present.      Left lower leg: Edema present.      Comments: Left upper extremity AV fistula.  Left great toe swelling and redness with 1 cm round wound under the nail medially   Lymphadenopathy:      Cervical: No cervical adenopathy.   Skin:     General: Skin is warm and dry.      Findings: No rash.      Comments: Right great toe with 3cm round sore under the nail, no drainage or peripheral redness, wound is improving   Neurological:      Mental Status: She is alert and oriented to person, place, and time.   Psychiatric:         Mood and Affect: Mood normal.         Behavior: Behavior normal.       RECORDS REVIEW:   Results  Wound cultures pending.  Left great toe x-ray presented no signs of fracture or osteomyelitis.      ASSESSMENT     Diagnoses and all orders for this visit:    1. Cellulitis of great toe of left foot (Primary)    2. Anxiety and depression    3. Type 2 diabetes mellitus with nephropathy    4. ESRD (end stage renal disease)    5. Class 2 severe obesity due to excess calories with serious comorbidity and body mass index (BMI) of 38.0 to 38.9 in adult    6. Generalized anxiety disorder    7. Gastroesophageal reflux disease with esophagitis and hemorrhage    8. Essential hypertension    9. Acquired hypothyroidism    10. Chronic diastolic congestive heart failure    11. Bilateral edema of lower extremity        Assessment & Plan      Left great toe cellulitis  -X-rays were negative for osteomyelitis or fracture.  - Cultures are pending at this time.  - Start doxycycline 100 mg p.o. twice daily x 10 days.  * not in my previous note I wrote right toe, this was incorrect, left toe is the location of the wound.     Anxiety and depression.  Her mood and behaviors remain stable with her current  regimen of Lexapro and lorazepam.  Attempts have been made for GDR in the past however this led to behavior issues which limited her ability to participate in dialysis.  Per nephrology request, medications will be maintained at current dosages.       Diabetes mellitus.  Her blood glucose levels have not significantly decreased, remaining in the range of 175 to 300, with an average around 220. The dosage of Ozempic will be increased from 0.25 mg to 0.5 mg weekly. She is advised to avoid excessive snacking and candy consumption, and to drink water instead of sugary beverages.    End-stage renal disease  - Continue hemodialysis as scheduled 3 times a week.  -CBC, CMP, A1c every 3 months.  -It is noted that nephrology preferred not to have adjustments to psychiatric medications due to concerns of behaviors during dialysis    Obesity  - monitor weights with discontinuing of Ozempic.  Patient advised to reduce high calorie foods.     Gastroesophageal Reflux Disease (GERD).  - controlled with PPI     Motion Sickness / Gastroparesis  She experiences nausea primarily during car rides. Zofran is available for use during transportation. Meclizine is also available for motion sickness if needed.    Bilateral Lower Extremity Edema  -Fair control with elevation and compression     Acute blood loss anemia secondary to esophageal ulcers  -Cont iron supplements.  CBC being monitored by nephrology regularly.      History of chronic diarrhea  - less issues reported lately.     Essential hypertension  - Stable controlled with current medication regimen.     Atrial fibrillation  - Stable control of rhythm.  Continue cardiac medications.  - Continue Eliquis.     Hypothyroidism  - Continue current dose of Synthroid.  TSH is monitored every 3 months in facility.         [x]  Discussed Patient in detail with nursing/staff, addressed all needs today.     [x]  Plan of Care Reviewed   []  PT/OT Reviewed   [x]  Order Changes  []  Discharge  Plans Reviewed  [x]  Advance Directive on file with Nursing Home.   [x]  POA on file with Nursing Home.    [x]  Code Status listed and reviewed.     I confirm accuracy of unchanged data/findings including physical exam and plan which have been carried forward from previous visit, as well as I have updated appropriately those that have changed        Rafiq Baron DO.  3/19/2025      Patient or patient representative verbalized consent for the use of Ambient Listening during the visit with  Rafiq Baron DO for chart documentation. 3/19/2025  09:51 EDT

## 2025-04-14 ENCOUNTER — HOSPITAL ENCOUNTER (EMERGENCY)
Facility: HOSPITAL | Age: 65
Discharge: NURSING, REHAB OR LONG-TERM CARE | End: 2025-04-14
Attending: EMERGENCY MEDICINE | Admitting: EMERGENCY MEDICINE
Payer: MEDICARE

## 2025-04-14 VITALS
HEART RATE: 115 BPM | WEIGHT: 238.1 LBS | DIASTOLIC BLOOD PRESSURE: 75 MMHG | HEIGHT: 66 IN | OXYGEN SATURATION: 98 % | TEMPERATURE: 97.8 F | SYSTOLIC BLOOD PRESSURE: 125 MMHG | BODY MASS INDEX: 38.27 KG/M2 | RESPIRATION RATE: 18 BRPM

## 2025-04-14 DIAGNOSIS — T82.838A BLEEDING PSEUDOANEURYSM OF LEFT BRACHIOCEPHALIC AV FISTULA: Primary | ICD-10-CM

## 2025-04-14 PROCEDURE — 99283 EMERGENCY DEPT VISIT LOW MDM: CPT | Performed by: EMERGENCY MEDICINE

## 2025-04-14 PROCEDURE — 25010000002 LIDOCAINE 1 % SOLUTION: Performed by: EMERGENCY MEDICINE

## 2025-04-14 RX ORDER — LIDOCAINE HYDROCHLORIDE 10 MG/ML
10 INJECTION, SOLUTION INFILTRATION; PERINEURAL ONCE
Status: COMPLETED | OUTPATIENT
Start: 2025-04-14 | End: 2025-04-14

## 2025-04-14 RX ORDER — TRANEXAMIC ACID 100 MG/ML
500 INJECTION, SOLUTION INTRAVENOUS ONCE
Status: COMPLETED | OUTPATIENT
Start: 2025-04-14 | End: 2025-04-14

## 2025-04-14 RX ADMIN — TRANEXAMIC ACID 500 MG: 100 INJECTION, SOLUTION INTRAVENOUS at 14:56

## 2025-04-14 RX ADMIN — LIDOCAINE HYDROCHLORIDE 10 ML: 10 INJECTION, SOLUTION INFILTRATION; PERINEURAL at 14:41

## 2025-04-14 NOTE — ED PROVIDER NOTES
EMERGENCY DEPARTMENT ENCOUNTER    Pt Name: Millicent Mckeon  MRN: [id number]  Pt :   1960  Room Number:    Date of encounter:  2025  PCP: Rafiq Baron DO  ED Provider: Ayush Saunders MD    Historian: Patient      HPI:  Chief Complaint   Patient presents with    Wound Check     Pt came in from [facility] for uncontrollable bleeding from AV fistula site after dialysis today. EMS reports facility states lost about 100-200ml of blood from site before arrival. Pt does take blood thinners. Usually MWF treatment. Denies any CP or SOA           Context: Millicent Mckeon is a 64 y.o. female who presents to the ED c/o AV fistula bleeding, patient is on dialysis, had a full run of dialysis this morning, noted in the transport van back to her nursing facility that she had active bleeding from the left arm, reports this is never happened before.  She is on anticoagulation      PAST MEDICAL HISTORY  Past Medical History:   Diagnosis Date    A-fib     Acute gastric ulcer without hemorrhage or perforation     Allergic     Anemia 10/02/2018    CHF (congestive heart failure)     Chronic respiratory failure with hypoxia     Chronic systolic (congestive) heart failure     Chronic venous hypertension involving both sides     Diabetes mellitus     Disease of thyroid gland     Edema of both lower extremities     End-stage renal disease on hemodialysis     Esophagitis     Gastroparesis     GERD (gastroesophageal reflux disease)     Gout     History of atrial flutter     History of transfusion     Hyperlipidemia     Hypertension     Hypothyroidism     Impaired functional mobility and activity tolerance     Impaired functional mobility, balance, gait, and endurance     Loss of consciousness     Lymphadenopathy     Morbid obesity     Mucopurulent chronic bronchitis     Nocturnal hypoxia     Obstructive sleep apnea syndrome     Pneumonia     Pressure ulcer of right heel, unstageable     Pulmonary  Problem: Adult Inpatient Plan of Care  Goal: Plan of Care Review  Outcome: Ongoing (interventions implemented as appropriate)  Pt received Opdivo; tolerated well. VSS and NAD. Pt instructed to call MD with any concerns. Pt discharged home independently.       hypertension     Secondary hyperparathyroidism     Stage 3 chronic kidney disease     Tachycardia-bradycardia syndrome     Type 2 diabetes mellitus with diabetic chronic kidney disease     Unspecified severe protein-calorie malnutrition     Vitamin B 12 deficiency          PAST SURGICAL HISTORY  Past Surgical History:   Procedure Laterality Date    ENDOSCOPY N/A 5/2/2022    Procedure: ESOPHAGOGASTRODUODENOSCOPY WITH BIOPSY;  Surgeon: Jacob Chance MD;  Location: [facility] ENDOSCOPY;  Service: Gastroenterology;  Laterality: N/A;    EYE SURGERY      INCISION AND DRAINAGE LEG Right 1/14/2019    Procedure: Right heel incision and drainage with graft application;  Surgeon: Ar Rueda DPM;  Location: [facility] OR;  Service: Podiatry    INSERTION HEMODIALYSIS CATHETER N/A 4/5/2022    Procedure: HEMODIALYSIS CATHETER INSERTION;  Surgeon: Jean Carlos Guadalupe MD;  Location: [facility] OR;  Service: General;  Laterality: N/A;    LEG SURGERY           FAMILY HISTORY  Family History   Problem Relation Age of Onset    Asthma Mother     Hypertension Father     Stroke Father          SOCIAL HISTORY  Social History     Socioeconomic History    Marital status: Single   Tobacco Use    Smoking status: Never    Smokeless tobacco: Never   Vaping Use    Vaping status: Never Used   Substance and Sexual Activity    Alcohol use: No    Drug use: No    Sexual activity: Defer         ALLERGIES  Metformin and related and Metformin        REVIEW OF SYSTEMS  Review of Systems   Constitutional:  Negative for chills and fever.   HENT:  Negative for sore throat and trouble swallowing.    Eyes:  Negative for pain and redness.   Respiratory:  Negative for cough and shortness of breath.    Cardiovascular:  Negative for chest pain and leg swelling.   Gastrointestinal:  Negative for abdominal pain, nausea and vomiting.   Genitourinary:  Negative for dysuria and urgency.   Musculoskeletal:  Negative for back pain and neck pain.        Bleeding AV  fistula   Skin:  Negative for rash and wound.   Neurological:  Negative for dizziness and weakness.        All systems reviewed and negative except for those discussed in HPI.       PHYSICAL EXAM    I have reviewed the triage vital signs and nursing notes.    ED Triage Vitals   Temp Heart Rate Resp BP SpO2   04/14/25 1338 04/14/25 1338 04/14/25 1338 04/14/25 1338 04/14/25 1338   97.8 °F (36.6 °C) 115 18 125/75 98 %      Temp src Heart Rate Source Patient Position BP Location FiO2 (%)   04/14/25 1340 04/14/25 1340 04/14/25 1340 04/14/25 1340 --   Oral Monitor Lying Right arm        Physical Exam  Constitutional:       Appearance: Normal appearance. She is not ill-appearing.   HENT:      Head: Normocephalic and atraumatic.      Right Ear: External ear normal.      Left Ear: External ear normal.      Nose: Nose normal.      Mouth/Throat:      Mouth: Mucous membranes are moist.      Pharynx: Oropharynx is clear.   Eyes:      Extraocular Movements: Extraocular movements intact.      Conjunctiva/sclera: Conjunctivae normal.      Pupils: Pupils are equal, round, and reactive to light.   Cardiovascular:      Rate and Rhythm: Normal rate and regular rhythm.      Pulses:           Radial pulses are 2+ on the right side and 2+ on the left side.   Pulmonary:      Effort: Pulmonary effort is normal.      Breath sounds: Normal breath sounds.   Abdominal:      General: There is no distension.      Palpations: Abdomen is soft.      Tenderness: There is no abdominal tenderness.   Musculoskeletal:         General: No tenderness or deformity. Normal range of motion.        Arms:       Cervical back: Normal range of motion and neck supple.      Right lower leg: No edema.      Left lower leg: No edema.   Skin:     General: Skin is warm and dry.      Capillary Refill: Capillary refill takes less than 2 seconds.   Neurological:      General: No focal deficit present.      Mental Status: She is alert and oriented to person, place, and  time.            LAB RESULTS  No results found for this or any previous visit (from the past 24 hours).    If labs were ordered, I independently reviewed the results and considered them in treating the patient.        RADIOLOGY  No Radiology Exams Resulted Within Past 24 Hours        PROCEDURES    Procedures    Interpretations    O2 Sat: The patient's oxygen saturation was 98% on Room Air.  This was independently interpreted by me as Normal      MEDICATIONS GIVEN IN ER    Medications   lidocaine (XYLOCAINE) 1 % injection 10 mL (10 mL Injection Given 4/14/25 1441)   tranexamic acid 500 MG/5ML (ED/Crit Care for lacerations) - VIAL DISPENSE 500 mg (500 mg Topical Given 4/14/25 1456)         MEDICAL DECISION MAKING, PROGRESS, and CONSULTS    All labs, if obtained, have been independently reviewed by me.  All radiology studies, if obtained, have been reviewed by me and the radiologist dictating the report.  All EKG's, if obtained, have been independently viewed and interpreted by me      Discussion below represents my analysis of pertinent findings related to patient's condition, differential diagnosis, treatment plan and final disposition.      Differential diagnosis:    C4-year-old female presenting ED with complaint of AV fistula bleeding, she has an active bleed at the inferior aspect of the AV fistula, it is otherwise a good thrill and bruit, she had full run of dialysis today.  Placed Surgicel, Ace wrap, will observe for more intense intervention required    Additional Sources:  External (non-ED) record review:  Nursing home record nephrology note 4/9/2025 regarding end-stage renal disease      Orders placed during this visit:  No orders of the defined types were placed in this encounter.        Additional orders considered but not ordered:  None    ED Course:    Consultants:  None    ED Course as of 04/14/25 1817 Mon Apr 14, 2025   1448 On reevaluation patient continues to have significant bleeding, after  discussion risk-benefit with patient decision made to place figure-of-eight stitch over the area of bleeding placed with good hemostasis [CS]   1521 Patient reevaluated, no further bleeding noted, dressing in place with topical TXA, advised to keep this in place until dialysis.  Patient will be discharged home. [CS]      ED Course User Index  [CS] Ayush Saunders MD           After my consideration of clinical presentation and any laboratory/radiology studies obtained, I discussed the findings with the patient/patient representative who is in agreement with the treatment plan and the final disposition. Risks and benefits of discharge were discussed.     AS OF 18:17 EDT VITALS:    BP - 125/75  HR - 115  TEMP - 97.8 °F (36.6 °C) (Oral)  O2 SATS - 98%    I reviewed the patient's prescription monitoring report if available prior to discharge    DIAGNOSIS  Final diagnoses:   Bleeding pseudoaneurysm of left brachiocephalic AV fistula         DISPOSITION  ED Disposition       ED Disposition   Discharge    Condition   Stable    Comment   --                   Please note that portions of this document were completed with voice recognition software.        Ayush Saunders MD  04/14/25 [id number]

## 2025-04-14 NOTE — CASE MANAGEMENT/SOCIAL WORK
Case Management/Social Work    Patient Name:  Millicent Mckeon  YOB: 1960  MRN: [id number]  Admit Date:  4/14/2025    Noted patient to not have a PCP listed.  Spoke to patient at bedside.  She is a long term care resident at [facility] where she is followed by Dr. Baron.  Will notify patient access to update patient's record/PCP.    Electronically signed by:  Nguyen Montero RN  04/14/25 15:16 EDT

## 2025-05-13 ENCOUNTER — NURSING HOME (OUTPATIENT)
Dept: INTERNAL MEDICINE | Facility: CLINIC | Age: 65
End: 2025-05-13
Payer: MEDICARE

## 2025-05-13 VITALS
SYSTOLIC BLOOD PRESSURE: 114 MMHG | BODY MASS INDEX: 37.33 KG/M2 | HEART RATE: 104 BPM | TEMPERATURE: 97.6 F | DIASTOLIC BLOOD PRESSURE: 74 MMHG | RESPIRATION RATE: 18 BRPM | OXYGEN SATURATION: 95 % | WEIGHT: 232.3 LBS | HEIGHT: 66 IN

## 2025-05-13 DIAGNOSIS — R60.0 BILATERAL EDEMA OF LOWER EXTREMITY: ICD-10-CM

## 2025-05-13 DIAGNOSIS — E66.01 CLASS 2 SEVERE OBESITY DUE TO EXCESS CALORIES WITH SERIOUS COMORBIDITY AND BODY MASS INDEX (BMI) OF 38.0 TO 38.9 IN ADULT: ICD-10-CM

## 2025-05-13 DIAGNOSIS — I50.32 CHRONIC DIASTOLIC CONGESTIVE HEART FAILURE: ICD-10-CM

## 2025-05-13 DIAGNOSIS — E11.21 TYPE 2 DIABETES MELLITUS WITH NEPHROPATHY: ICD-10-CM

## 2025-05-13 DIAGNOSIS — D64.9 CHRONIC ANEMIA: ICD-10-CM

## 2025-05-13 DIAGNOSIS — E03.9 ACQUIRED HYPOTHYROIDISM: ICD-10-CM

## 2025-05-13 DIAGNOSIS — E66.812 CLASS 2 SEVERE OBESITY DUE TO EXCESS CALORIES WITH SERIOUS COMORBIDITY AND BODY MASS INDEX (BMI) OF 38.0 TO 38.9 IN ADULT: ICD-10-CM

## 2025-05-13 DIAGNOSIS — N18.6 ESRD (END STAGE RENAL DISEASE): ICD-10-CM

## 2025-05-13 DIAGNOSIS — F32.A ANXIETY AND DEPRESSION: ICD-10-CM

## 2025-05-13 DIAGNOSIS — I10 ESSENTIAL HYPERTENSION: ICD-10-CM

## 2025-05-13 DIAGNOSIS — F41.1 GENERALIZED ANXIETY DISORDER: ICD-10-CM

## 2025-05-13 DIAGNOSIS — K21.01 GASTROESOPHAGEAL REFLUX DISEASE WITH ESOPHAGITIS AND HEMORRHAGE: ICD-10-CM

## 2025-05-13 DIAGNOSIS — F41.9 ANXIETY AND DEPRESSION: ICD-10-CM

## 2025-05-13 DIAGNOSIS — L03.032 CELLULITIS OF GREAT TOE OF LEFT FOOT: Primary | ICD-10-CM

## 2025-05-13 NOTE — LETTER
Nursing Home Progress Note        Rafiq Baron DO   [address] [place] [address] Phone: [phone]  Fax: [phone]     PATIENT NAME: Millicent Mckeon                                                                          YOB: 1960           DATE OF SERVICE: 05/13/2025  FACILITY:  [facility]      CHIEF COMPLAINT:  Chronic Medical Management    History of Present Illness  The patient is a 64-year-old female with a history of type 2 diabetes mellitus, end-stage renal disease, and hypertension, seen in the nursing clinic for routine medical management.  On exam today, patient was in good spirits        PAST MEDICAL & SURGICAL HISTORY:   Past Medical History:   Diagnosis Date   • A-fib    • Acute gastric ulcer without hemorrhage or perforation    • Allergic    • Anemia 10/02/2018   • CHF (congestive heart failure)    • Chronic respiratory failure with hypoxia    • Chronic systolic (congestive) heart failure    • Chronic venous hypertension involving both sides    • Diabetes mellitus    • Disease of thyroid gland    • Edema of both lower extremities    • End-stage renal disease on hemodialysis    • Esophagitis    • Gastroparesis    • GERD (gastroesophageal reflux disease)    • Gout    • History of atrial flutter    • History of transfusion    • Hyperlipidemia    • Hypertension    • Hypothyroidism    • Impaired functional mobility and activity tolerance    • Impaired functional mobility, balance, gait, and endurance    • Loss of consciousness    • Lymphadenopathy    • Morbid obesity    • Mucopurulent chronic bronchitis    • Nocturnal hypoxia    • Obstructive sleep apnea syndrome    • Pneumonia    • Pressure ulcer of right heel, unstageable    • Pulmonary hypertension    • Secondary hyperparathyroidism    • Stage 3 chronic kidney disease    • Tachycardia-bradycardia syndrome    • Type 2 diabetes mellitus with diabetic chronic kidney disease    • Unspecified severe protein-calorie  malnutrition    • Vitamin B 12 deficiency       Past Surgical History:   Procedure Laterality Date   • ENDOSCOPY N/A 5/2/2022    Procedure: ESOPHAGOGASTRODUODENOSCOPY WITH BIOPSY;  Surgeon: Jacob Chance MD;  Location: [facility] ENDOSCOPY;  Service: Gastroenterology;  Laterality: N/A;   • EYE SURGERY     • INCISION AND DRAINAGE LEG Right 1/14/2019    Procedure: Right heel incision and drainage with graft application;  Surgeon: Ar Rueda DPM;  Location: [facility] OR;  Service: Podiatry   • INSERTION HEMODIALYSIS CATHETER N/A 4/5/2022    Procedure: HEMODIALYSIS CATHETER INSERTION;  Surgeon: Jean Carlos Guadalupe MD;  Location: [facility] OR;  Service: General;  Laterality: N/A;   • LEG SURGERY           MEDICATIONS:  I have reviewed and reconciled the patients medication list in the patients chart at the [facility] today, 05/13/2025.      ALLERGIES:  Allergies   Allergen Reactions   • Metformin And Related Anaphylaxis     HA, diarrhea, throat swelling   • Metformin GI Intolerance         SOCIAL HISTORY:  Social History     Socioeconomic History   • Marital status: Single   Tobacco Use   • Smoking status: Never   • Smokeless tobacco: Never   Vaping Use   • Vaping status: Never Used   Substance and Sexual Activity   • Alcohol use: No   • Drug use: No   • Sexual activity: Defer       FAMILY HISTORY:  Family History   Problem Relation Age of Onset   • Asthma Mother    • Hypertension Father    • Stroke Father        REVIEW OF SYSTEMS:  Review of Systems   Constitutional:  Negative for chills, fatigue and fever.   HENT:  Negative for congestion, ear pain, rhinorrhea, sinus pressure and sore throat.    Eyes:  Negative for visual disturbance.   Respiratory:  Negative for cough, chest tightness, shortness of breath and wheezing.    Cardiovascular:  Negative for chest pain, palpitations and leg swelling.   Gastrointestinal:  Negative for abdominal pain, blood in stool, constipation, diarrhea, nausea and  "vomiting.   Endocrine: Negative for polydipsia and polyuria.   Genitourinary:  Negative for dysuria and hematuria.   Musculoskeletal:  Negative for arthralgias and back pain.   Skin:  Negative for rash.   Neurological:  Negative for dizziness, light-headedness, numbness and headaches.   Psychiatric/Behavioral:  Negative for dysphoric mood and sleep disturbance. The patient is not nervous/anxious.         PHYSICAL EXAMINATION:     VITAL SIGNS:  /74   Pulse 104   Temp 97.6 °F (36.4 °C)   Resp 18   Ht 167.6 cm (66\")   Wt 105 kg (232 lb 4.8 oz)   SpO2 95%   BMI 37.49 kg/m²     Physical Exam  Vitals and nursing note reviewed.   Constitutional:       Appearance: Normal appearance. She is well-developed. She is obese.   HENT:      Head: Normocephalic and atraumatic.      Nose: Nose normal.      Mouth/Throat:      Mouth: Mucous membranes are moist.      Pharynx: No oropharyngeal exudate.   Eyes:      General: No scleral icterus.     Conjunctiva/sclera: Conjunctivae normal.      Pupils: Pupils are equal, round, and reactive to light.   Neck:      Thyroid: No thyromegaly.   Cardiovascular:      Rate and Rhythm: Normal rate. Rhythm irregular.      Heart sounds: Normal heart sounds. No murmur heard.     No friction rub. No gallop.   Pulmonary:      Effort: Pulmonary effort is normal. No respiratory distress.      Breath sounds: Normal breath sounds. No wheezing.   Abdominal:      General: Bowel sounds are normal. There is no distension.      Palpations: Abdomen is soft.      Tenderness: There is no abdominal tenderness.   Musculoskeletal:         General: No deformity or signs of injury.      Cervical back: Normal range of motion and neck supple.      Right lower leg: Edema present.      Left lower leg: Edema present.      Comments: Left upper extremity AV fistula.  Left great toe swelling and redness with 1 cm round wound under the nail medially   Lymphadenopathy:      Cervical: No cervical adenopathy.   Skin:     " General: Skin is warm and dry.      Findings: No rash.      Comments: Right great toe with 3cm round sore under the nail, no drainage or peripheral redness, wound is improving   Neurological:      Mental Status: She is alert and oriented to person, place, and time.   Psychiatric:         Mood and Affect: Mood normal.         Behavior: Behavior normal.         RECORDS REVIEW:   Results  Laboratory Studies  TSH was 1.67. A1c dated for 03/18/2025 was 7.3. CMP from that same date showed a creatinine of 4.8, glucose 106, WBC 9.3, hemoglobin 12.3.      ASSESSMENT     Diagnoses and all orders for this visit:    1. Cellulitis of great toe of left foot (Primary)    2. Anxiety and depression    3. Type 2 diabetes mellitus with nephropathy    4. ESRD (end stage renal disease)    5. Class 2 severe obesity due to excess calories with serious comorbidity and body mass index (BMI) of 38.0 to 38.9 in adult    6. Generalized anxiety disorder    7. Gastroesophageal reflux disease with esophagitis and hemorrhage    8. Essential hypertension    9. Acquired hypothyroidism    10. Chronic diastolic congestive heart failure    11. Bilateral edema of lower extremity    12. Chronic anemia        Assessment & Plan       Diabetes mellitus.  - improved control, will increase ozempic to 1mg SQ weekly with goals to reduce insulin use.  Lantus dose reduced to 10U QHS (instead of nightly).     Anxiety and depression.  Her mood and behaviors remain stable with her current regimen of Lexapro and lorazepam.  Attempts have been made for GDR in the past however this led to behavior issues which limited her ability to participate in dialysis.  Per nephrology request, medications will be maintained at current dosages.      End-stage renal disease  - Continue hemodialysis as scheduled 3 times a week.  -CBC, CMP, A1c every 3 months.  -It is noted that nephrology preferred not to have adjustments to psychiatric medications due to concerns of behaviors during  dialysis    Obesity  - monitor weights with discontinuing of Ozempic.  Patient advised to reduce high calorie foods.     Gastroesophageal Reflux Disease (GERD).  - controlled with PPI     Motion Sickness / Gastroparesis  She experiences nausea primarily during car rides. Zofran is available for use during transportation. Meclizine is also available for motion sickness if needed.    Bilateral Lower Extremity Edema  -Fair control with elevation and compression     Acute blood loss anemia secondary to esophageal ulcers  -Cont iron supplements.  CBC being monitored by nephrology regularly.      History of chronic diarrhea  - less issues reported lately.     Essential hypertension  - Stable controlled with current medication regimen.     Atrial fibrillation  - Stable control of rhythm.  Continue cardiac medications.  - Continue Eliquis.     Hypothyroidism  - Continue current dose of Synthroid.  TSH is monitored every 3 months in facility.           [x]  Discussed Patient in detail with nursing/staff, addressed all needs today.     [x]  Plan of Care Reviewed   []  PT/OT Reviewed   []  Order Changes  []  Discharge Plans Reviewed  [x]  Advance Directive on file with Nursing Home.   [x]  POA on file with Nursing Home.    [x]  Code Status listed and reviewed.     I confirm accuracy of unchanged data/findings including physical exam and plan which have been carried forward from previous visit, as well as I have updated appropriately those that have changed        Rafiq Baron DO.  5/21/2025      Patient or patient representative verbalized consent for the use of Ambient Listening during the visit with  Rafiq Baron DO for chart documentation. 5/21/2025  11:20 EDT

## 2025-05-21 NOTE — PROGRESS NOTES
Nursing Home Progress Note        Rafiq Baron DO   [address] [place] [address] Phone: [phone]  Fax: [phone]     PATIENT NAME: Millicent Mckeon                                                                          YOB: 1960           DATE OF SERVICE: 05/13/2025  FACILITY:  [facility]      CHIEF COMPLAINT:  Chronic Medical Management    History of Present Illness  The patient is a 64-year-old female with a history of type 2 diabetes mellitus, end-stage renal disease, and hypertension, seen in the nursing clinic for routine medical management.  On exam today, patient was in good spirits        PAST MEDICAL & SURGICAL HISTORY:   Past Medical History:   Diagnosis Date    A-fib     Acute gastric ulcer without hemorrhage or perforation     Allergic     Anemia 10/02/2018    CHF (congestive heart failure)     Chronic respiratory failure with hypoxia     Chronic systolic (congestive) heart failure     Chronic venous hypertension involving both sides     Diabetes mellitus     Disease of thyroid gland     Edema of both lower extremities     End-stage renal disease on hemodialysis     Esophagitis     Gastroparesis     GERD (gastroesophageal reflux disease)     Gout     History of atrial flutter     History of transfusion     Hyperlipidemia     Hypertension     Hypothyroidism     Impaired functional mobility and activity tolerance     Impaired functional mobility, balance, gait, and endurance     Loss of consciousness     Lymphadenopathy     Morbid obesity     Mucopurulent chronic bronchitis     Nocturnal hypoxia     Obstructive sleep apnea syndrome     Pneumonia     Pressure ulcer of right heel, unstageable     Pulmonary hypertension     Secondary hyperparathyroidism     Stage 3 chronic kidney disease     Tachycardia-bradycardia syndrome     Type 2 diabetes mellitus with diabetic chronic kidney disease     Unspecified severe protein-calorie malnutrition     Vitamin B 12 deficiency        Past Surgical History:   Procedure Laterality Date    ENDOSCOPY N/A 5/2/2022    Procedure: ESOPHAGOGASTRODUODENOSCOPY WITH BIOPSY;  Surgeon: Jacob Chance MD;  Location: [facility] ENDOSCOPY;  Service: Gastroenterology;  Laterality: N/A;    EYE SURGERY      INCISION AND DRAINAGE LEG Right 1/14/2019    Procedure: Right heel incision and drainage with graft application;  Surgeon: Ar Rueda DPM;  Location: [facility] OR;  Service: Podiatry    INSERTION HEMODIALYSIS CATHETER N/A 4/5/2022    Procedure: HEMODIALYSIS CATHETER INSERTION;  Surgeon: Jean Carlos Guadalupe MD;  Location: [facility] OR;  Service: General;  Laterality: N/A;    LEG SURGERY           MEDICATIONS:  I have reviewed and reconciled the patients medication list in the patients chart at the skilled nursing facility today, 05/13/2025.      ALLERGIES:  Allergies   Allergen Reactions    Metformin And Related Anaphylaxis     HA, diarrhea, throat swelling    Metformin GI Intolerance         SOCIAL HISTORY:  Social History     Socioeconomic History    Marital status: Single   Tobacco Use    Smoking status: Never    Smokeless tobacco: Never   Vaping Use    Vaping status: Never Used   Substance and Sexual Activity    Alcohol use: No    Drug use: No    Sexual activity: Defer       FAMILY HISTORY:  Family History   Problem Relation Age of Onset    Asthma Mother     Hypertension Father     Stroke Father        REVIEW OF SYSTEMS:  Review of Systems   Constitutional:  Negative for chills, fatigue and fever.   HENT:  Negative for congestion, ear pain, rhinorrhea, sinus pressure and sore throat.    Eyes:  Negative for visual disturbance.   Respiratory:  Negative for cough, chest tightness, shortness of breath and wheezing.    Cardiovascular:  Negative for chest pain, palpitations and leg swelling.   Gastrointestinal:  Negative for abdominal pain, blood in stool, constipation, diarrhea, nausea and vomiting.   Endocrine: Negative for polydipsia and polyuria.  "  Genitourinary:  Negative for dysuria and hematuria.   Musculoskeletal:  Negative for arthralgias and back pain.   Skin:  Negative for rash.   Neurological:  Negative for dizziness, light-headedness, numbness and headaches.   Psychiatric/Behavioral:  Negative for dysphoric mood and sleep disturbance. The patient is not nervous/anxious.         PHYSICAL EXAMINATION:     VITAL SIGNS:  /74   Pulse 104   Temp 97.6 °F (36.4 °C)   Resp 18   Ht 167.6 cm (66\")   Wt 105 kg (232 lb 4.8 oz)   SpO2 95%   BMI 37.49 kg/m²     Physical Exam  Vitals and nursing note reviewed.   Constitutional:       Appearance: Normal appearance. She is well-developed. She is obese.   HENT:      Head: Normocephalic and atraumatic.      Nose: Nose normal.      Mouth/Throat:      Mouth: Mucous membranes are moist.      Pharynx: No oropharyngeal exudate.   Eyes:      General: No scleral icterus.     Conjunctiva/sclera: Conjunctivae normal.      Pupils: Pupils are equal, round, and reactive to light.   Neck:      Thyroid: No thyromegaly.   Cardiovascular:      Rate and Rhythm: Normal rate. Rhythm irregular.      Heart sounds: Normal heart sounds. No murmur heard.     No friction rub. No gallop.   Pulmonary:      Effort: Pulmonary effort is normal. No respiratory distress.      Breath sounds: Normal breath sounds. No wheezing.   Abdominal:      General: Bowel sounds are normal. There is no distension.      Palpations: Abdomen is soft.      Tenderness: There is no abdominal tenderness.   Musculoskeletal:         General: No deformity or signs of injury.      Cervical back: Normal range of motion and neck supple.      Right lower leg: Edema present.      Left lower leg: Edema present.      Comments: Left upper extremity AV fistula.  Left great toe swelling and redness with 1 cm round wound under the nail medially   Lymphadenopathy:      Cervical: No cervical adenopathy.   Skin:     General: Skin is warm and dry.      Findings: No rash.      " Comments: Right great toe with 3cm round sore under the nail, no drainage or peripheral redness, wound is improving   Neurological:      Mental Status: She is alert and oriented to person, place, and time.   Psychiatric:         Mood and Affect: Mood normal.         Behavior: Behavior normal.         RECORDS REVIEW:   Results  Laboratory Studies  TSH was 1.67. A1c dated for 03/18/2025 was 7.3. CMP from that same date showed a creatinine of 4.8, glucose 106, WBC 9.3, hemoglobin 12.3.      ASSESSMENT     Diagnoses and all orders for this visit:    1. Cellulitis of great toe of left foot (Primary)    2. Anxiety and depression    3. Type 2 diabetes mellitus with nephropathy    4. ESRD (end stage renal disease)    5. Class 2 severe obesity due to excess calories with serious comorbidity and body mass index (BMI) of 38.0 to 38.9 in adult    6. Generalized anxiety disorder    7. Gastroesophageal reflux disease with esophagitis and hemorrhage    8. Essential hypertension    9. Acquired hypothyroidism    10. Chronic diastolic congestive heart failure    11. Bilateral edema of lower extremity    12. Chronic anemia        Assessment & Plan       Diabetes mellitus.  - improved control, will increase ozempic to 1mg SQ weekly with goals to reduce insulin use.  Lantus dose reduced to 10U QHS (instead of nightly).     Anxiety and depression.  Her mood and behaviors remain stable with her current regimen of Lexapro and lorazepam.  Attempts have been made for GDR in the past however this led to behavior issues which limited her ability to participate in dialysis.  Per nephrology request, medications will be maintained at current dosages.      End-stage renal disease  - Continue hemodialysis as scheduled 3 times a week.  -CBC, CMP, A1c every 3 months.  -It is noted that nephrology preferred not to have adjustments to psychiatric medications due to concerns of behaviors during dialysis    Obesity  - monitor weights with discontinuing  of Ozempic.  Patient advised to reduce high calorie foods.     Gastroesophageal Reflux Disease (GERD).  - controlled with PPI     Motion Sickness / Gastroparesis  She experiences nausea primarily during car rides. Zofran is available for use during transportation. Meclizine is also available for motion sickness if needed.    Bilateral Lower Extremity Edema  -Fair control with elevation and compression     Acute blood loss anemia secondary to esophageal ulcers  -Cont iron supplements.  CBC being monitored by nephrology regularly.      History of chronic diarrhea  - less issues reported lately.     Essential hypertension  - Stable controlled with current medication regimen.     Atrial fibrillation  - Stable control of rhythm.  Continue cardiac medications.  - Continue Eliquis.     Hypothyroidism  - Continue current dose of Synthroid.  TSH is monitored every 3 months in facility.           [x]  Discussed Patient in detail with nursing/staff, addressed all needs today.     [x]  Plan of Care Reviewed   []  PT/OT Reviewed   []  Order Changes  []  Discharge Plans Reviewed  [x]  Advance Directive on file with Nursing Home.   [x]  POA on file with Nursing Home.    [x]  Code Status listed and reviewed.     I confirm accuracy of unchanged data/findings including physical exam and plan which have been carried forward from previous visit, as well as I have updated appropriately those that have changed        Rafiq Baron DO.  5/21/2025      Patient or patient representative verbalized consent for the use of Ambient Listening during the visit with  Rafiq Baron DO for chart documentation. 5/21/2025  11:20 EDT

## 2025-07-08 ENCOUNTER — NURSING HOME (OUTPATIENT)
Dept: INTERNAL MEDICINE | Facility: CLINIC | Age: 65
End: 2025-07-08
Payer: MEDICARE

## 2025-07-08 VITALS
DIASTOLIC BLOOD PRESSURE: 87 MMHG | WEIGHT: 229.9 LBS | TEMPERATURE: 97.3 F | HEART RATE: 107 BPM | HEIGHT: 66 IN | OXYGEN SATURATION: 96 % | SYSTOLIC BLOOD PRESSURE: 103 MMHG | RESPIRATION RATE: 18 BRPM | BODY MASS INDEX: 36.95 KG/M2

## 2025-07-08 DIAGNOSIS — K21.01 GASTROESOPHAGEAL REFLUX DISEASE WITH ESOPHAGITIS AND HEMORRHAGE: ICD-10-CM

## 2025-07-08 DIAGNOSIS — E66.01 CLASS 2 SEVERE OBESITY DUE TO EXCESS CALORIES WITH SERIOUS COMORBIDITY AND BODY MASS INDEX (BMI) OF 38.0 TO 38.9 IN ADULT: ICD-10-CM

## 2025-07-08 DIAGNOSIS — N18.6 ESRD (END STAGE RENAL DISEASE): ICD-10-CM

## 2025-07-08 DIAGNOSIS — I50.32 CHRONIC DIASTOLIC CONGESTIVE HEART FAILURE: ICD-10-CM

## 2025-07-08 DIAGNOSIS — E66.812 CLASS 2 SEVERE OBESITY DUE TO EXCESS CALORIES WITH SERIOUS COMORBIDITY AND BODY MASS INDEX (BMI) OF 38.0 TO 38.9 IN ADULT: ICD-10-CM

## 2025-07-08 DIAGNOSIS — F32.A ANXIETY AND DEPRESSION: ICD-10-CM

## 2025-07-08 DIAGNOSIS — F41.1 GENERALIZED ANXIETY DISORDER: ICD-10-CM

## 2025-07-08 DIAGNOSIS — I10 ESSENTIAL HYPERTENSION: ICD-10-CM

## 2025-07-08 DIAGNOSIS — F41.9 ANXIETY AND DEPRESSION: ICD-10-CM

## 2025-07-08 DIAGNOSIS — E11.21 TYPE 2 DIABETES MELLITUS WITH NEPHROPATHY: Primary | ICD-10-CM

## 2025-07-08 DIAGNOSIS — E03.9 ACQUIRED HYPOTHYROIDISM: ICD-10-CM

## 2025-07-08 NOTE — PROGRESS NOTES
Nursing Home Progress Note        Rafiq Baron DO   [address] [place] [address] Phone: [phone]  Fax: [phone]     PATIENT NAME: Millicent Mckeon                                                                          YOB: 1960           DATE OF SERVICE: 07/08/2025  FACILITY:  [facility]      CHIEF COMPLAINT:  Chronic Medical Management    History of Present Illness  Patient was seen in the nursing facility today for routine medical management.  Mood has been stable.  She has been compliant with dialysis sessions.  Ozempic has improved her glucose control but has not necessarily helped with her weight. Other chronic issues remain stable.        PAST MEDICAL & SURGICAL HISTORY:   Past Medical History:   Diagnosis Date    A-fib     Acute gastric ulcer without hemorrhage or perforation     Allergic     Anemia 10/02/2018    CHF (congestive heart failure)     Chronic respiratory failure with hypoxia     Chronic systolic (congestive) heart failure     Chronic venous hypertension involving both sides     Diabetes mellitus     Disease of thyroid gland     Edema of both lower extremities     End-stage renal disease on hemodialysis     Esophagitis     Gastroparesis     GERD (gastroesophageal reflux disease)     Gout     History of atrial flutter     History of transfusion     Hyperlipidemia     Hypertension     Hypothyroidism     Impaired functional mobility and activity tolerance     Impaired functional mobility, balance, gait, and endurance     Loss of consciousness     Lymphadenopathy     Morbid obesity     Mucopurulent chronic bronchitis     Nocturnal hypoxia     Obstructive sleep apnea syndrome     Pneumonia     Pressure ulcer of right heel, unstageable     Pulmonary hypertension     Secondary hyperparathyroidism     Stage 3 chronic kidney disease     Tachycardia-bradycardia syndrome     Type 2 diabetes mellitus with diabetic chronic kidney disease     Unspecified severe  protein-calorie malnutrition     Vitamin B 12 deficiency       Past Surgical History:   Procedure Laterality Date    ENDOSCOPY N/A 5/2/2022    Procedure: ESOPHAGOGASTRODUODENOSCOPY WITH BIOPSY;  Surgeon: Jacob Chance MD;  Location: [facility] ENDOSCOPY;  Service: Gastroenterology;  Laterality: N/A;    EYE SURGERY      INCISION AND DRAINAGE LEG Right 1/14/2019    Procedure: Right heel incision and drainage with graft application;  Surgeon: Ar Rueda DPM;  Location: [facility] OR;  Service: Podiatry    INSERTION HEMODIALYSIS CATHETER N/A 4/5/2022    Procedure: HEMODIALYSIS CATHETER INSERTION;  Surgeon: Jean Carlos Guadalupe MD;  Location: [facility] OR;  Service: General;  Laterality: N/A;    LEG SURGERY           MEDICATIONS:  I have reviewed and reconciled the patients medication list in the patients chart at the [facility] nursing [facility] today, 07/08/2025.      ALLERGIES:  Allergies   Allergen Reactions    Metformin And Related Anaphylaxis     HA, diarrhea, throat swelling    Metformin GI Intolerance         SOCIAL HISTORY:  Social History     Socioeconomic History    Marital status: Single   Tobacco Use    Smoking status: Never    Smokeless tobacco: Never   Vaping Use    Vaping status: Never Used   Substance and Sexual Activity    Alcohol use: No    Drug use: No    Sexual activity: Defer       FAMILY HISTORY:  Family History   Problem Relation Age of Onset    Asthma Mother     Hypertension Father     Stroke Father        REVIEW OF SYSTEMS:  Review of Systems   Constitutional:  Negative for chills, fatigue and fever.   HENT:  Negative for congestion, ear pain, rhinorrhea, sinus pressure and sore throat.    Eyes:  Negative for visual disturbance.   Respiratory:  Negative for cough, chest tightness, shortness of breath and wheezing.    Cardiovascular:  Negative for chest pain, palpitations and leg swelling.   Gastrointestinal:  Negative for abdominal pain, blood in stool, constipation, diarrhea, nausea and  "vomiting.   Endocrine: Negative for polydipsia and polyuria.   Genitourinary:  Negative for dysuria and hematuria.   Musculoskeletal:  Negative for arthralgias and back pain.   Skin:  Negative for rash.   Neurological:  Negative for dizziness, light-headedness, numbness and headaches.   Psychiatric/Behavioral:  Negative for dysphoric mood and sleep disturbance. The patient is not nervous/anxious.         PHYSICAL EXAMINATION:     VITAL SIGNS:  /87   Pulse 107   Temp 97.3 °F (36.3 °C)   Resp 18   Ht 167.6 cm (66\")   Wt 104 kg (229 lb 14.4 oz)   SpO2 96%   BMI 37.11 kg/m²     Physical Exam  Vitals and nursing note reviewed.   Constitutional:       Appearance: Normal appearance. She is well-developed. She is obese.   HENT:      Head: Normocephalic and atraumatic.      Nose: Nose normal.      Mouth/Throat:      Mouth: Mucous membranes are moist.      Pharynx: No oropharyngeal exudate.   Eyes:      General: No scleral icterus.     Conjunctiva/sclera: Conjunctivae normal.      Pupils: Pupils are equal, round, and reactive to light.   Neck:      Thyroid: No thyromegaly.   Cardiovascular:      Rate and Rhythm: Normal rate. Rhythm irregular.      Heart sounds: Normal heart sounds. No murmur heard.     No friction rub. No gallop.   Pulmonary:      Effort: Pulmonary effort is normal. No respiratory distress.      Breath sounds: Normal breath sounds. No wheezing.   Abdominal:      General: Bowel sounds are normal. There is no distension.      Palpations: Abdomen is soft.      Tenderness: There is no abdominal tenderness.   Musculoskeletal:         General: No deformity or signs of injury.      Cervical back: Normal range of motion and neck supple.      Right lower leg: Edema present.      Left lower leg: Edema present.      Comments: Left upper extremity AV fistula.  Left great toe swelling and redness with 1 cm round wound under the nail medially   Lymphadenopathy:      Cervical: No cervical adenopathy.   Skin:    "  General: Skin is warm and dry.      Findings: No rash.      Comments: Right great toe with 3cm round sore under the nail, no drainage or peripheral redness, wound is improving   Neurological:      Mental Status: She is alert and oriented to person, place, and time.   Psychiatric:         Mood and Affect: Mood normal.         Behavior: Behavior normal.       RECORDS REVIEW:   Results        ASSESSMENT     Diagnoses and all orders for this visit:    1. Type 2 diabetes mellitus with nephropathy (Primary)    2. ESRD (end stage renal disease)    3. Class 2 severe obesity due to excess calories with serious comorbidity and body mass index (BMI) of 38.0 to 38.9 in adult    4. Anxiety and depression    5. Generalized anxiety disorder    6. Gastroesophageal reflux disease with esophagitis and hemorrhage    7. Essential hypertension    8. Acquired hypothyroidism    9. Chronic diastolic congestive heart failure        Assessment & Plan       Diabetes mellitus.  - improved control, continue ozempic to 1mg SQ weekly with Lantus.     Anxiety and depression.  Her mood and behaviors remain stable with her current regimen of Lexapro and lorazepam.  Attempts have been made for GDR in the past however this led to behavior issues which limited her ability to participate in dialysis.  Per nephrology request, medications will be maintained at current dosages.      End-stage renal disease  - Continue hemodialysis as scheduled 3 times a week.  -CBC, CMP, A1c every 3 months.  -It is noted that nephrology preferred not to have adjustments to psychiatric medications due to concerns of behaviors during dialysis    Obesity  - monitor weights with discontinuing of Ozempic.  Patient advised to reduce high calorie foods.     Gastroesophageal Reflux Disease (GERD).  - controlled with PPI     Motion Sickness / Gastroparesis  She experiences nausea primarily during car rides. Zofran is available for use during transportation. Meclizine is also  available for motion sickness if needed.    Bilateral Lower Extremity Edema  -Fair control with elevation and compression     Acute blood loss anemia secondary to esophageal ulcers  -Cont iron supplements.  CBC being monitored by nephrology regularly.      History of chronic diarrhea  - less issues reported lately.     Essential hypertension  - Stable controlled with current medication regimen.     Atrial fibrillation  - Stable control of rhythm.  Continue cardiac medications.  - Continue Eliquis.     Hypothyroidism  - Continue current dose of Synthroid.  TSH is monitored every 3 months in facility.         [x]  Discussed Patient in detail with nursing/staff, addressed all needs today.     [x]  Plan of Care Reviewed   []  PT/OT Reviewed   []  Order Changes  []  Discharge Plans Reviewed  [x]  Advance Directive on file with Nursing Home.   [x]  POA on file with Nursing Home.    [x]  Code Status listed and reviewed.     I confirm accuracy of unchanged data/findings including physical exam and plan which have been carried forward from previous visit, as well as I have updated appropriately those that have changed        Rafiq Baron DO.  7/13/2025      Patient or patient representative verbalized consent for the use of Ambient Listening during the visit with  Rafiq Baron DO for chart documentation. 7/13/2025  10:31 EDT

## 2025-07-08 NOTE — LETTER
Nursing Home Progress Note        Rafiq Baron DO   [address] [place] [address] Phone: [phone]  Fax: [phone]     PATIENT NAME: Millicent Mckeon                                                                          YOB: 1960           DATE OF SERVICE: 07/08/2025  FACILITY:  [facility]      CHIEF COMPLAINT:  Chronic Medical Management    History of Present Illness  Patient was seen in the nursing facility today for routine medical management.  Mood has been stable.  She has been compliant with dialysis sessions.  Ozempic has improved her glucose control but has not necessarily helped with her weight. Other chronic issues remain stable.        PAST MEDICAL & SURGICAL HISTORY:   Past Medical History:   Diagnosis Date   • A-fib    • Acute gastric ulcer without hemorrhage or perforation    • Allergic    • Anemia 10/02/2018   • CHF (congestive heart failure)    • Chronic respiratory failure with hypoxia    • Chronic systolic (congestive) heart failure    • Chronic venous hypertension involving both sides    • Diabetes mellitus    • Disease of thyroid gland    • Edema of both lower extremities    • End-stage renal disease on hemodialysis    • Esophagitis    • Gastroparesis    • GERD (gastroesophageal reflux disease)    • Gout    • History of atrial flutter    • History of transfusion    • Hyperlipidemia    • Hypertension    • Hypothyroidism    • Impaired functional mobility and activity tolerance    • Impaired functional mobility, balance, gait, and endurance    • Loss of consciousness    • Lymphadenopathy    • Morbid obesity    • Mucopurulent chronic bronchitis    • Nocturnal hypoxia    • Obstructive sleep apnea syndrome    • Pneumonia    • Pressure ulcer of right heel, unstageable    • Pulmonary hypertension    • Secondary hyperparathyroidism    • Stage 3 chronic kidney disease    • Tachycardia-bradycardia syndrome    • Type 2 diabetes mellitus with diabetic chronic kidney  disease    • Unspecified severe protein-calorie malnutrition    • Vitamin B 12 deficiency       Past Surgical History:   Procedure Laterality Date   • ENDOSCOPY N/A 5/2/2022    Procedure: ESOPHAGOGASTRODUODENOSCOPY WITH BIOPSY;  Surgeon: Jacob Chance MD;  Location: [facility] ENDOSCOPY;  Service: Gastroenterology;  Laterality: N/A;   • EYE SURGERY     • INCISION AND DRAINAGE LEG Right 1/14/2019    Procedure: Right heel incision and drainage with graft application;  Surgeon: Ar Rueda DPM;  Location: [facility] OR;  Service: Podiatry   • INSERTION HEMODIALYSIS CATHETER N/A 4/5/2022    Procedure: HEMODIALYSIS CATHETER INSERTION;  Surgeon: Jean Carlos Guadalupe MD;  Location: [facility] OR;  Service: General;  Laterality: N/A;   • LEG SURGERY           MEDICATIONS:  I have reviewed and reconciled the patients medication list in the patients chart at the [facility] nursing [facility] today, 07/08/2025.      ALLERGIES:  Allergies   Allergen Reactions   • Metformin And Related Anaphylaxis     HA, diarrhea, throat swelling   • Metformin GI Intolerance         SOCIAL HISTORY:  Social History     Socioeconomic History   • Marital status: Single   Tobacco Use   • Smoking status: Never   • Smokeless tobacco: Never   Vaping Use   • Vaping status: Never Used   Substance and Sexual Activity   • Alcohol use: No   • Drug use: No   • Sexual activity: Defer       FAMILY HISTORY:  Family History   Problem Relation Age of Onset   • Asthma Mother    • Hypertension Father    • Stroke Father        REVIEW OF SYSTEMS:  Review of Systems   Constitutional:  Negative for chills, fatigue and fever.   HENT:  Negative for congestion, ear pain, rhinorrhea, sinus pressure and sore throat.    Eyes:  Negative for visual disturbance.   Respiratory:  Negative for cough, chest tightness, shortness of breath and wheezing.    Cardiovascular:  Negative for chest pain, palpitations and leg swelling.   Gastrointestinal:  Negative for abdominal pain, blood in  "stool, constipation, diarrhea, nausea and vomiting.   Endocrine: Negative for polydipsia and polyuria.   Genitourinary:  Negative for dysuria and hematuria.   Musculoskeletal:  Negative for arthralgias and back pain.   Skin:  Negative for rash.   Neurological:  Negative for dizziness, light-headedness, numbness and headaches.   Psychiatric/Behavioral:  Negative for dysphoric mood and sleep disturbance. The patient is not nervous/anxious.         PHYSICAL EXAMINATION:     VITAL SIGNS:  /87   Pulse 107   Temp 97.3 °F (36.3 °C)   Resp 18   Ht 167.6 cm (66\")   Wt 104 kg (229 lb 14.4 oz)   SpO2 96%   BMI 37.11 kg/m²     Physical Exam  Vitals and nursing note reviewed.   Constitutional:       Appearance: Normal appearance. She is well-developed. She is obese.   HENT:      Head: Normocephalic and atraumatic.      Nose: Nose normal.      Mouth/Throat:      Mouth: Mucous membranes are moist.      Pharynx: No oropharyngeal exudate.   Eyes:      General: No scleral icterus.     Conjunctiva/sclera: Conjunctivae normal.      Pupils: Pupils are equal, round, and reactive to light.   Neck:      Thyroid: No thyromegaly.   Cardiovascular:      Rate and Rhythm: Normal rate. Rhythm irregular.      Heart sounds: Normal heart sounds. No murmur heard.     No friction rub. No gallop.   Pulmonary:      Effort: Pulmonary effort is normal. No respiratory distress.      Breath sounds: Normal breath sounds. No wheezing.   Abdominal:      General: Bowel sounds are normal. There is no distension.      Palpations: Abdomen is soft.      Tenderness: There is no abdominal tenderness.   Musculoskeletal:         General: No deformity or signs of injury.      Cervical back: Normal range of motion and neck supple.      Right lower leg: Edema present.      Left lower leg: Edema present.      Comments: Left upper extremity AV fistula.  Left great toe swelling and redness with 1 cm round wound under the nail medially   Lymphadenopathy:      " Cervical: No cervical adenopathy.   Skin:     General: Skin is warm and dry.      Findings: No rash.      Comments: Right great toe with 3cm round sore under the nail, no drainage or peripheral redness, wound is improving   Neurological:      Mental Status: She is alert and oriented to person, place, and time.   Psychiatric:         Mood and Affect: Mood normal.         Behavior: Behavior normal.       RECORDS REVIEW:   Results        ASSESSMENT     Diagnoses and all orders for this visit:    1. Type 2 diabetes mellitus with nephropathy (Primary)    2. ESRD (end stage renal disease)    3. Class 2 severe obesity due to excess calories with serious comorbidity and body mass index (BMI) of 38.0 to 38.9 in adult    4. Anxiety and depression    5. Generalized anxiety disorder    6. Gastroesophageal reflux disease with esophagitis and hemorrhage    7. Essential hypertension    8. Acquired hypothyroidism    9. Chronic diastolic congestive heart failure        Assessment & Plan       Diabetes mellitus.  - improved control, continue ozempic to 1mg SQ weekly with Lantus.     Anxiety and depression.  Her mood and behaviors remain stable with her current regimen of Lexapro and lorazepam.  Attempts have been made for GDR in the past however this led to behavior issues which limited her ability to participate in dialysis.  Per nephrology request, medications will be maintained at current dosages.      End-stage renal disease  - Continue hemodialysis as scheduled 3 times a week.  -CBC, CMP, A1c every 3 months.  -It is noted that nephrology preferred not to have adjustments to psychiatric medications due to concerns of behaviors during dialysis    Obesity  - monitor weights with discontinuing of Ozempic.  Patient advised to reduce high calorie foods.     Gastroesophageal Reflux Disease (GERD).  - controlled with PPI     Motion Sickness / Gastroparesis  She experiences nausea primarily during car rides. Zofran is available for use  during transportation. Meclizine is also available for motion sickness if needed.    Bilateral Lower Extremity Edema  -Fair control with elevation and compression     Acute blood loss anemia secondary to esophageal ulcers  -Cont iron supplements.  CBC being monitored by nephrology regularly.      History of chronic diarrhea  - less issues reported lately.     Essential hypertension  - Stable controlled with current medication regimen.     Atrial fibrillation  - Stable control of rhythm.  Continue cardiac medications.  - Continue Eliquis.     Hypothyroidism  - Continue current dose of Synthroid.  TSH is monitored every 3 months in facility.         [x]  Discussed Patient in detail with nursing/staff, addressed all needs today.     [x]  Plan of Care Reviewed   []  PT/OT Reviewed   []  Order Changes  []  Discharge Plans Reviewed  [x]  Advance Directive on file with Nursing Home.   [x]  POA on file with Nursing Home.    [x]  Code Status listed and reviewed.     I confirm accuracy of unchanged data/findings including physical exam and plan which have been carried forward from previous visit, as well as I have updated appropriately those that have changed        Rafiq Baron DO.  7/13/2025      Patient or patient representative verbalized consent for the use of Ambient Listening during the visit with  Rafiq Baron DO for chart documentation. 7/13/2025  10:31 EDT

## 2025-08-11 DIAGNOSIS — F41.9 ANXIETY: ICD-10-CM

## 2025-08-11 RX ORDER — LORAZEPAM 0.5 MG/1
TABLET ORAL
Qty: 15 TABLET | Refills: 5 | Status: SHIPPED | OUTPATIENT
Start: 2025-08-11

## (undated) DEVICE — ENDOSCOPY PORT CONNECTOR FOR OLYMPUS® SCOPES: Brand: ERBE

## (undated) DEVICE — DECANT BG O JET

## (undated) DEVICE — GLV SURG BIOGEL PI ULTRATOUCH G SZ7.5 LF

## (undated) DEVICE — ST IRR CYSTO W/SPK 77IN LF

## (undated) DEVICE — FORCEP SPEC RETRV BX AD 2 MMX155 CM 5 MM GI OVL CUP W/ NDL

## (undated) DEVICE — GAUZE,PACKING STRIP,PLAIN,1/4"X5YD,STRL: Brand: CURAD

## (undated) DEVICE — CONMED SCOPE SAVER BITE BLOCK, 20X27 MM: Brand: SCOPE SAVER

## (undated) DEVICE — CLAVICLE STRAP: Brand: DEROYAL

## (undated) DEVICE — SUT ETHLN 3/0 FS1 663G

## (undated) DEVICE — SOL NACL 0.9PCT 100ML SGL

## (undated) DEVICE — BANDAGE,GAUZE,BULKEE II,4.5"X4.1YD,STRL: Brand: MEDLINE

## (undated) DEVICE — GLV SURG SENSICARE GREEN W/ALOE PF LF 8 STRL

## (undated) DEVICE — DRSNG SURESITE123 6X8IN

## (undated) DEVICE — Device

## (undated) DEVICE — BNDG ELAS ELITE V/CLOSE 4IN 5YD LF STRL

## (undated) DEVICE — DRSNG WND GZ CURAD OIL EMULSION 3X3IN STRL

## (undated) DEVICE — THIN OFFSET (9.0 X 0.38 X 25.0MM)

## (undated) DEVICE — CVR PROB ULTRASND GLS STRL

## (undated) DEVICE — NDL HYPO ECLPS SFTY 25G 1 1/2IN

## (undated) DEVICE — KT PERC INTRO PEELPRO PTFE STDTRWY 16F 14

## (undated) DEVICE — GLV SURG SENSICARE W/ALOE PF LF 7.5 STRL

## (undated) DEVICE — SYR LUERLOK 5CC

## (undated) DEVICE — NDL HYPO ECLPS SFTY 18G 1 1/2IN

## (undated) DEVICE — 1000 S-DRAPE TOWEL DRAPE 10/BX: Brand: STERI-DRAPE™

## (undated) DEVICE — NON-ADHERENT STRIPS,OIL EMULSION: Brand: CURITY

## (undated) DEVICE — KT CATH HEMO NEXTSTEP 15F 23CM RED

## (undated) DEVICE — SUCTION CANISTER, 1500CC, RIGID: Brand: DEROYAL

## (undated) DEVICE — SMARTGOWN SURGICAL GOWN, 2XL: Brand: CONVERTORS

## (undated) DEVICE — SYR LL TP 10ML STRL

## (undated) DEVICE — RICH MAJOR PROCEDURE: Brand: MEDLINE INDUSTRIES, INC.

## (undated) DEVICE — VLV SXN AIR/H2O ORCAPOD3 1P/U STRL

## (undated) DEVICE — HYBRID TUBING/CAP SET FOR OLYMPUS® SCOPES: Brand: ERBE

## (undated) DEVICE — PAD,ABDOMINAL,5"X9",STERILE,LF,1/PK: Brand: MEDLINE INDUSTRIES, INC.

## (undated) DEVICE — SPNG GZ WOVN 4X4IN 12PLY 10/BX STRL

## (undated) DEVICE — PK EXTRM LOWR 20

## (undated) DEVICE — FRCP BX RADJAW4 NDL 2.8 240 STD OG

## (undated) DEVICE — SOL IRR H2O BTL 1000ML STRL

## (undated) DEVICE — SYR LUERLOK 30CC